# Patient Record
Sex: FEMALE | Race: BLACK OR AFRICAN AMERICAN | Employment: FULL TIME | URBAN - METROPOLITAN AREA
[De-identification: names, ages, dates, MRNs, and addresses within clinical notes are randomized per-mention and may not be internally consistent; named-entity substitution may affect disease eponyms.]

---

## 2017-02-16 ENCOUNTER — HOSPITAL ENCOUNTER (EMERGENCY)
Facility: HOSPITAL | Age: 52
Discharge: HOME | End: 2017-02-16
Attending: EMERGENCY MEDICINE
Payer: COMMERCIAL

## 2017-02-16 ENCOUNTER — APPOINTMENT (OUTPATIENT)
Dept: ULTRASOUND IMAGING | Facility: HOSPITAL | Age: 52
End: 2017-02-16
Attending: EMERGENCY MEDICINE
Payer: COMMERCIAL

## 2017-02-16 ENCOUNTER — APPOINTMENT (OUTPATIENT)
Dept: GENERAL RADIOLOGY | Facility: HOSPITAL | Age: 52
End: 2017-02-16
Attending: EMERGENCY MEDICINE
Payer: COMMERCIAL

## 2017-02-16 VITALS
DIASTOLIC BLOOD PRESSURE: 82 MMHG | TEMPERATURE: 98 F | SYSTOLIC BLOOD PRESSURE: 137 MMHG | WEIGHT: 235 LBS | HEART RATE: 72 BPM | RESPIRATION RATE: 18 BRPM | HEIGHT: 65 IN | BODY MASS INDEX: 39.15 KG/M2 | OXYGEN SATURATION: 95 %

## 2017-02-16 DIAGNOSIS — M25.561 ACUTE PAIN OF RIGHT KNEE: Primary | ICD-10-CM

## 2017-02-16 PROCEDURE — 99284 EMERGENCY DEPT VISIT MOD MDM: CPT

## 2017-02-16 PROCEDURE — 93971 EXTREMITY STUDY: CPT

## 2017-02-16 PROCEDURE — 73560 X-RAY EXAM OF KNEE 1 OR 2: CPT

## 2017-02-16 RX ORDER — IBUPROFEN 600 MG/1
600 TABLET ORAL EVERY 8 HOURS PRN
Qty: 30 TABLET | Refills: 0 | Status: SHIPPED | OUTPATIENT
Start: 2017-02-16 | End: 2017-02-23

## 2017-02-16 RX ORDER — ACETAMINOPHEN AND CODEINE PHOSPHATE 300; 30 MG/1; MG/1
1 TABLET ORAL EVERY 4 HOURS PRN
Qty: 20 TABLET | Refills: 0 | Status: SHIPPED | OUTPATIENT
Start: 2017-02-16 | End: 2017-02-23

## 2017-02-16 NOTE — ED PROVIDER NOTES
Patient Seen in: [facility] AND [facility] Emergency Department    History   Patient presents with:  Lower Extremity Injury (musculoskeletal)    Stated Complaint: right knee pain     HPI    Patient presents the emergency department complaining of right knee pain otherwise stated in HPI.     Physical Exam       ED Triage Vitals   BP 02/16/17 0906 156/83 mmHg   Pulse 02/16/17 0906 81   Resp 02/16/17 0906 18   Temp 02/16/17 0906 97.9 °F (36.6 °C)   Temp src 02/16/17 0906 Oral   SpO2 02/16/17 0906 97 %   O2 Device 02/1 hours as needed for Pain or Fever., Script printed, Disp-30 tablet, R-0

## 2017-02-20 RX ORDER — LOSARTAN POTASSIUM AND HYDROCHLOROTHIAZIDE 12.5; 5 MG/1; MG/1
1 TABLET ORAL DAILY
Qty: 30 TABLET | Refills: 2 | Status: SHIPPED | OUTPATIENT
Start: 2017-02-20 | End: 2017-04-17

## 2017-02-20 NOTE — TELEPHONE ENCOUNTER
Current Outpatient Prescriptions:  Losartan Potassium-HCTZ (HYZAAR) 50-12.5 MG Oral Tab Take 1 tablet by mouth daily.  Disp: 30 tablet Rfl: 5     Refill

## 2017-02-27 ENCOUNTER — TELEPHONE (OUTPATIENT)
Dept: FAMILY MEDICINE CLINIC | Facility: CLINIC | Age: 52
End: 2017-02-27

## 2017-02-27 NOTE — TELEPHONE ENCOUNTER
Patient calling and states she is currently at dentist office for a tooth extraction.   Patient states dental office needs authorization for anesthesia

## 2017-02-27 NOTE — TELEPHONE ENCOUNTER
[id number] Jacklyn Jaime if anesthesiologist present. Patient has Hypertension and Chronic kidney disease.    I have not seen her since oct 2016

## 2017-03-01 NOTE — TELEPHONE ENCOUNTER
Chang Rodriguez care calling to follow-up on request below. States that a fax was sent to our office Monday 2/27 and no response has been received. Patient is scheduled for dental procedure tomorrow. Call [phone] and ask to speak to Krysta.

## 2017-03-01 NOTE — TELEPHONE ENCOUNTER
Chang Rodriguez is calling f/u on pt clearance state that she is leaving the office and need this today please requesting a call back

## 2017-03-02 NOTE — TELEPHONE ENCOUNTER
Patient may get dental extraction per her dentist using local anesthesia with lidocaine with epinephrine as long as monitored for signs of  Hypotension or significant hypertension and  heart rate.  Also, as she has Chronic kidney disease, medication effect

## 2017-03-02 NOTE — TELEPHONE ENCOUNTER
Krysta from dental care needs clearance form. Pt is scheduled for procedure today. Krysta stts it will be local anesthetic.        Fax # [phone]

## 2017-04-10 PROBLEM — M23.91 INTERNAL DERANGEMENT OF KNEE, RIGHT: Status: ACTIVE | Noted: 2017-04-10

## 2017-04-17 ENCOUNTER — OFFICE VISIT (OUTPATIENT)
Dept: FAMILY MEDICINE CLINIC | Facility: CLINIC | Age: 52
End: 2017-04-17

## 2017-04-17 VITALS
SYSTOLIC BLOOD PRESSURE: 133 MMHG | TEMPERATURE: 98 F | WEIGHT: 245 LBS | HEART RATE: 92 BPM | BODY MASS INDEX: 40.82 KG/M2 | DIASTOLIC BLOOD PRESSURE: 88 MMHG | HEIGHT: 65 IN

## 2017-04-17 DIAGNOSIS — N89.8 VAGINAL DISCHARGE: Primary | ICD-10-CM

## 2017-04-17 DIAGNOSIS — Z00.00 WELL ADULT EXAM: ICD-10-CM

## 2017-04-17 DIAGNOSIS — I10 ESSENTIAL HYPERTENSION: ICD-10-CM

## 2017-04-17 PROCEDURE — 99214 OFFICE O/P EST MOD 30 MIN: CPT | Performed by: FAMILY MEDICINE

## 2017-04-17 PROCEDURE — 99212 OFFICE O/P EST SF 10 MIN: CPT | Performed by: FAMILY MEDICINE

## 2017-04-17 RX ORDER — LOSARTAN POTASSIUM AND HYDROCHLOROTHIAZIDE 12.5; 5 MG/1; MG/1
1 TABLET ORAL DAILY
Qty: 90 TABLET | Refills: 1 | Status: SHIPPED | OUTPATIENT
Start: 2017-04-17 | End: 2017-12-13

## 2017-04-17 RX ORDER — AMLODIPINE BESYLATE 10 MG/1
10 TABLET ORAL
Qty: 90 TABLET | Refills: 1 | Status: SHIPPED | OUTPATIENT
Start: 2017-04-17 | End: 2017-12-13

## 2017-04-17 NOTE — PROGRESS NOTES
HPI:    Patient ID: Zari Benjamin is a 46year old female. Blood Pressure  This is a recurrent problem. Episode onset: 2 WEEKS. The problem has been unchanged. Pertinent negatives include no chills or fever. Nothing aggravates the symptoms.  She has trie Chlamydia/Gc Amplification [E]    2. Well adult exam  FOLLOW UP PHYSICAL. LABS ORDERED    3.  Essential hypertension  STABLE  CPM        Orders Placed This Encounter  Gentital vaginosis screen [E]  Chlamydia/Gc Amplification [E]    Meds This Visit:  Signed

## 2017-04-27 ENCOUNTER — HOSPITAL ENCOUNTER (EMERGENCY)
Facility: HOSPITAL | Age: 52
Discharge: HOME | End: 2017-04-27
Attending: EMERGENCY MEDICINE
Payer: COMMERCIAL

## 2017-04-27 ENCOUNTER — HOSPITAL ENCOUNTER (OUTPATIENT)
Age: 52
Discharge: ANOTHER HOSPITAL | End: 2017-04-27
Attending: EMERGENCY MEDICINE
Payer: COMMERCIAL

## 2017-04-27 VITALS
WEIGHT: 225 LBS | HEART RATE: 82 BPM | SYSTOLIC BLOOD PRESSURE: 157 MMHG | TEMPERATURE: 98 F | OXYGEN SATURATION: 98 % | DIASTOLIC BLOOD PRESSURE: 97 MMHG | HEIGHT: 65 IN | BODY MASS INDEX: 37.49 KG/M2 | RESPIRATION RATE: 16 BRPM

## 2017-04-27 VITALS
HEART RATE: 75 BPM | RESPIRATION RATE: 18 BRPM | DIASTOLIC BLOOD PRESSURE: 92 MMHG | BODY MASS INDEX: 39.15 KG/M2 | OXYGEN SATURATION: 100 % | HEIGHT: 65 IN | TEMPERATURE: 98 F | WEIGHT: 235 LBS | SYSTOLIC BLOOD PRESSURE: 148 MMHG

## 2017-04-27 DIAGNOSIS — R10.13 EPIGASTRIC DISCOMFORT: Primary | ICD-10-CM

## 2017-04-27 DIAGNOSIS — R68.2 DRY MOUTH: Primary | ICD-10-CM

## 2017-04-27 PROCEDURE — 93005 ELECTROCARDIOGRAM TRACING: CPT

## 2017-04-27 PROCEDURE — 99282 EMERGENCY DEPT VISIT SF MDM: CPT

## 2017-04-27 PROCEDURE — 93010 ELECTROCARDIOGRAM REPORT: CPT | Performed by: EMERGENCY MEDICINE

## 2017-04-27 PROCEDURE — 93010 ELECTROCARDIOGRAM REPORT: CPT

## 2017-04-27 PROCEDURE — 99215 OFFICE O/P EST HI 40 MIN: CPT

## 2017-04-27 RX ORDER — ONDANSETRON 4 MG/1
4 TABLET, ORALLY DISINTEGRATING ORAL ONCE
Status: DISCONTINUED | OUTPATIENT
Start: 2017-04-27 | End: 2017-04-27

## 2017-04-27 NOTE — ED INITIAL ASSESSMENT (HPI)
Dry mouth, bad taste in mouth. Abdominal discomfort. Denies any increase thirst or voiding difficulties. Feels nauseated at this moment. Denies diarrhea and vomiting.  Denies CP and SOB

## 2017-04-28 NOTE — ED PROVIDER NOTES
Patient Seen in: [facility] AND CLINICS Immediate Care In [address] [place] [address]    History   Patient presents with:  Nausea/Vomiting/Diarrhea (gastrointestinal)    Stated Complaint: upset stomach,bad taste in mouth    HPI    10-year-old female presents for evaluation of u 04/27/17 1716 97.8 °F (36.6 °C)   Temp src 04/27/17 1716 Oral   SpO2 04/27/17 1716 98 %   O2 Device 04/27/17 1716 None (Room air)       Current:/97 mmHg  Pulse 82  Temp(Src) 97.8 °F (36.6 °C) (Oral)  Resp 16  Ht 165.1 cm (5' 5\")  Wt 102.059 kg  BMI provider specified.     Medications Prescribed:  Discharge Medication List as of 4/27/2017  6:25 PM

## 2017-04-28 NOTE — ED PROVIDER NOTES
Patient Seen in: [facility] AND [facility] Emergency Department    History   Patient presents with:  Nausea/Vomiting/Diarrhea (gastrointestinal)    Stated Complaint:     HPI    Patient is a 43-year-old female who presents with dry mouth ×1 month.   Patient states and agreed except as otherwise stated in HPI.     Physical Exam     ED Triage Vitals   BP 04/27/17 1905 148/92 mmHg   Pulse 04/27/17 1905 75   Resp 04/27/17 1905 18   Temp 04/27/17 1905 97.8 °F (36.6 °C)   Temp src 04/27/17 1905 Oral   SpO2 04/27/17 1905 10

## 2017-05-01 ENCOUNTER — TELEPHONE (OUTPATIENT)
Dept: FAMILY MEDICINE CLINIC | Facility: CLINIC | Age: 52
End: 2017-05-01

## 2017-05-01 NOTE — TELEPHONE ENCOUNTER
Pt states she has questions about her previous ER visit and requesting to speak with a nurse. Please advise.

## 2017-05-02 NOTE — TELEPHONE ENCOUNTER
Patient went to both IC and ER 4/27/17. Patient left without cardiac studies. Had bad taste in mouth; c/o of chest pain last week, 3 days prior to ER visit. Didn't have any symptoms now.  She had EKG done and normal. Patient advised to schedule f/u visit to

## 2017-05-02 NOTE — TELEPHONE ENCOUNTER
LMTCB ANETTE please transfer to [facility] at [phone] today or RN at [phone]

## 2017-05-08 ENCOUNTER — OFFICE VISIT (OUTPATIENT)
Dept: OTOLARYNGOLOGY | Facility: CLINIC | Age: 52
End: 2017-05-08

## 2017-05-08 VITALS
SYSTOLIC BLOOD PRESSURE: 149 MMHG | HEIGHT: 65 IN | TEMPERATURE: 97 F | BODY MASS INDEX: 39.15 KG/M2 | DIASTOLIC BLOOD PRESSURE: 97 MMHG | WEIGHT: 235 LBS

## 2017-05-08 DIAGNOSIS — J01.20 SUBACUTE ETHMOIDAL SINUSITIS: Primary | ICD-10-CM

## 2017-05-08 PROCEDURE — 99203 OFFICE O/P NEW LOW 30 MIN: CPT | Performed by: OTOLARYNGOLOGY

## 2017-05-08 PROCEDURE — 99212 OFFICE O/P EST SF 10 MIN: CPT | Performed by: OTOLARYNGOLOGY

## 2017-05-08 RX ORDER — AMOXICILLIN AND CLAVULANATE POTASSIUM 875; 125 MG/1; MG/1
1 TABLET, FILM COATED ORAL EVERY 12 HOURS
Qty: 14 TABLET | Refills: 0 | Status: SHIPPED | OUTPATIENT
Start: 2017-05-08 | End: 2017-05-15

## 2017-05-08 RX ORDER — OMEPRAZOLE 40 MG/1
40 CAPSULE, DELAYED RELEASE ORAL DAILY
Qty: 30 CAPSULE | Refills: 11 | Status: SHIPPED | OUTPATIENT
Start: 2017-05-08 | End: 2017-12-19

## 2017-05-08 NOTE — PROGRESS NOTES
Lorenzo Phillips is a 46year old female. Patient presents with:  Sinus Problem: post nasal drip, c/o metallic taste for 1 month      HISTORY OF PRESENT ILLNESS  5/8/2017  Patient prevents for evaluation of suspected sinusitis. This is not recurrent.  Recent Negative Dyspnea and wheezing. Cardio Negative Chest pain, irregular heartbeat/palpitations and syncope. GI Negative Abdominal pain and diarrhea. Endocrine Negative Cold intolerance and heat intolerance. Neuro Negative Tremors.    Psych Negative Anx a meal, Disp: 30 capsule, Rfl: 11  •  Losartan Potassium-HCTZ (HYZAAR) 50-12.5 MG Oral Tab, Take 1 tablet by mouth daily. , Disp: 90 tablet, Rfl: 1  •  AmLODIPine Besylate 10 MG Oral Tab, Take 1 tablet (10 mg total) by mouth once daily. , Disp: 90 tablet, Rf

## 2017-06-20 ENCOUNTER — OFFICE VISIT (OUTPATIENT)
Dept: FAMILY MEDICINE CLINIC | Facility: CLINIC | Age: 52
End: 2017-06-20

## 2017-06-20 VITALS
TEMPERATURE: 98 F | BODY MASS INDEX: 40.82 KG/M2 | SYSTOLIC BLOOD PRESSURE: 138 MMHG | WEIGHT: 245 LBS | HEART RATE: 77 BPM | DIASTOLIC BLOOD PRESSURE: 88 MMHG | HEIGHT: 65 IN

## 2017-06-20 DIAGNOSIS — E78.00 HYPERCHOLESTEREMIA: ICD-10-CM

## 2017-06-20 DIAGNOSIS — E66.01 MORBID OBESITY WITH BMI OF 40.0-44.9, ADULT (HCC): ICD-10-CM

## 2017-06-20 DIAGNOSIS — Z00.00 WELL ADULT EXAM: Primary | ICD-10-CM

## 2017-06-20 DIAGNOSIS — Z12.31 ENCOUNTER FOR SCREENING MAMMOGRAM FOR BREAST CANCER: ICD-10-CM

## 2017-06-20 DIAGNOSIS — Z01.419 ENCOUNTER FOR GYNECOLOGICAL EXAMINATION: ICD-10-CM

## 2017-06-20 DIAGNOSIS — I10 ESSENTIAL HYPERTENSION: ICD-10-CM

## 2017-06-20 DIAGNOSIS — Z12.11 COLON CANCER SCREENING: ICD-10-CM

## 2017-06-20 PROCEDURE — 99396 PREV VISIT EST AGE 40-64: CPT | Performed by: FAMILY MEDICINE

## 2017-06-20 NOTE — PROGRESS NOTES
HPI:   Lorenzo Phillips is a 46year old female who presents for a complete physical exam. Symptoms: is menopausal.     Wt Readings from Last 6 Encounters:  06/20/17 : 245 lb (111.131 kg)  05/08/17 : 235 lb (106.595 kg)  04/27/17 : 235 lb (106.595 kg)  04/27/ week       Comment: marco, 3 x week    Occ: ups. : n. Children: 1.       Immunization History   Administered Date(s) Administered   • Kenalog Per 10mg Inj 02/20/2017   • Tb Intradermal Test 02/10/2015       FIT Colorectal Screening due on 10/25/20 nerves are intact,motor and sensory are grossly intact    ASSESSMENT AND PLAN:   Mroaima Castillo is a 46year old female who presents for a complete physical exam. pelvic done. Order put in for mammogram.Self breast exam explained.  Health maintenance reviewe

## 2017-07-08 ENCOUNTER — TELEPHONE (OUTPATIENT)
Dept: FAMILY MEDICINE CLINIC | Facility: CLINIC | Age: 52
End: 2017-07-08

## 2017-07-08 NOTE — TELEPHONE ENCOUNTER
On-call note: Patient states she has had vaginal discharge for more than 1 week. She states there is nothing on the panties but she feels a slight irritation below and if she puts her finger inside she gets a little white discharge on her finger.   She has

## 2017-07-09 ENCOUNTER — HOSPITAL ENCOUNTER (OUTPATIENT)
Age: 52
Discharge: HOME | End: 2017-07-09
Payer: COMMERCIAL

## 2017-07-09 VITALS
WEIGHT: 250 LBS | DIASTOLIC BLOOD PRESSURE: 94 MMHG | RESPIRATION RATE: 18 BRPM | TEMPERATURE: 99 F | HEART RATE: 87 BPM | BODY MASS INDEX: 41.65 KG/M2 | HEIGHT: 65 IN | SYSTOLIC BLOOD PRESSURE: 145 MMHG

## 2017-07-09 DIAGNOSIS — N76.1 SUBACUTE VAGINITIS: Primary | ICD-10-CM

## 2017-07-09 PROCEDURE — 87205 SMEAR GRAM STAIN: CPT | Performed by: EMERGENCY MEDICINE

## 2017-07-09 PROCEDURE — 87491 CHLMYD TRACH DNA AMP PROBE: CPT | Performed by: EMERGENCY MEDICINE

## 2017-07-09 PROCEDURE — 99214 OFFICE O/P EST MOD 30 MIN: CPT

## 2017-07-09 PROCEDURE — 87106 FUNGI IDENTIFICATION YEAST: CPT | Performed by: EMERGENCY MEDICINE

## 2017-07-09 PROCEDURE — 87808 TRICHOMONAS ASSAY W/OPTIC: CPT | Performed by: EMERGENCY MEDICINE

## 2017-07-09 PROCEDURE — 87591 N.GONORRHOEAE DNA AMP PROB: CPT | Performed by: EMERGENCY MEDICINE

## 2017-07-09 NOTE — ED PROVIDER NOTES
Patient Seen in: [facility] AND CLINICS Immediate Care In [address] [place] [address]    History   Patient presents with:  Eval-G (gynecologic)    Stated Complaint: discharge    HPI    Patient is a 28-year-old female who presents to the urgent care with a chief complaint of vag °C)  Temp src: Oral  SpO2: n/a  O2 Device: n/a    Current:/94   Pulse 87   Temp 98.8 °F (37.1 °C) (Oral)   Resp 18   Ht 165.1 cm (5' 5\")   Wt 113.4 kg   BMI 41.60 kg/m²         Physical Exam   Constitutional: She is oriented to person, place, and ti
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
## 2017-07-10 NOTE — TELEPHONE ENCOUNTER
DR Peter Ace, patient declined to make appt, asymptomatic at this time, will await call back with results of vaginosis screen and GC/chlamydia

## 2017-07-10 NOTE — TELEPHONE ENCOUNTER
Reason for call changed from blank to ONCALL  Chart reviewed, patient treated 7/9/17 ADO IC for subacute vaginitis, genital vaginosis screeen, gc chlamydia sent to lab  Actions Requested: patient asymptomatic at this time,  declined to make f/u appt, will

## 2017-07-11 LAB
CANDIDA SCREEN: NEGATIVE
GENITAL VAGINOSIS SCREEN: NEGATIVE
TRICHOMONAS SCREEN: NEGATIVE

## 2017-08-07 ENCOUNTER — TELEPHONE (OUTPATIENT)
Dept: GASTROENTEROLOGY | Facility: CLINIC | Age: 52
End: 2017-08-07

## 2017-08-07 NOTE — TELEPHONE ENCOUNTER
Pt contacted --she could not get off earlier, so I switched with other pt --Fan Camacho is agreeable to a 2:15pm arrival for 2:30pm appt.

## 2017-08-07 NOTE — TELEPHONE ENCOUNTER
PHONE ROOM--TRANSFER TO Valerio Cevallos RN.    I left complete message on pt's voice mail to call--need to make a schedule change in her Tues Aug 15 appt currently at 2:15pm..  I need to see if pt can accept 1:45pm appt that day with 1:30pm arrival at [facility], but have

## 2017-08-10 ENCOUNTER — HOSPITAL ENCOUNTER (OUTPATIENT)
Dept: MAMMOGRAPHY | Age: 52
Discharge: HOME | End: 2017-08-10
Attending: FAMILY MEDICINE
Payer: COMMERCIAL

## 2017-08-10 ENCOUNTER — LAB ENCOUNTER (OUTPATIENT)
Dept: LAB | Age: 52
End: 2017-08-10
Attending: FAMILY MEDICINE
Payer: COMMERCIAL

## 2017-08-10 DIAGNOSIS — Z12.31 ENCOUNTER FOR SCREENING MAMMOGRAM FOR BREAST CANCER: ICD-10-CM

## 2017-08-10 DIAGNOSIS — Z00.00 WELL ADULT EXAM: ICD-10-CM

## 2017-08-10 LAB
ALT SERPL-CCNC: 31 U/L (ref 14–54)
ANION GAP SERPL CALC-SCNC: 10 MMOL/L (ref 0–18)
AST SERPL-CCNC: 26 U/L (ref 15–41)
BASOPHILS # BLD: 0.1 K/UL (ref 0–0.2)
BASOPHILS NFR BLD: 1 %
BUN SERPL-MCNC: 20 MG/DL (ref 8–20)
BUN/CREAT SERPL: 20.4 (ref 10–20)
CALCIUM SERPL-MCNC: 9.5 MG/DL (ref 8.5–10.5)
CHLORIDE SERPL-SCNC: 107 MMOL/L (ref 95–110)
CHOLEST SERPL-MCNC: 214 MG/DL (ref 110–200)
CO2 SERPL-SCNC: 24 MMOL/L (ref 22–32)
CREAT SERPL-MCNC: 0.98 MG/DL (ref 0.5–1.5)
EOSINOPHIL # BLD: 0.1 K/UL (ref 0–0.7)
EOSINOPHIL NFR BLD: 2 %
ERYTHROCYTE [DISTWIDTH] IN BLOOD BY AUTOMATED COUNT: 16 % (ref 11–15)
GLUCOSE SERPL-MCNC: 97 MG/DL (ref 70–99)
HCT VFR BLD AUTO: 42.3 % (ref 35–48)
HDLC SERPL-MCNC: 52 MG/DL
HGB BLD-MCNC: 13.8 G/DL (ref 12–16)
LDLC SERPL CALC-MCNC: 141 MG/DL (ref 0–99)
LYMPHOCYTES # BLD: 2 K/UL (ref 1–4)
LYMPHOCYTES NFR BLD: 33 %
MCH RBC QN AUTO: 27 PG (ref 27–32)
MCHC RBC AUTO-ENTMCNC: 32.6 G/DL (ref 32–37)
MCV RBC AUTO: 82.9 FL (ref 80–100)
MONOCYTES # BLD: 0.4 K/UL (ref 0–1)
MONOCYTES NFR BLD: 7 %
NEUTROPHILS # BLD AUTO: 3.5 K/UL (ref 1.8–7.7)
NEUTROPHILS NFR BLD: 57 %
NONHDLC SERPL-MCNC: 162 MG/DL
OSMOLALITY UR CALC.SUM OF ELEC: 295 MOSM/KG (ref 275–295)
PLATELET # BLD AUTO: 219 K/UL (ref 140–400)
PMV BLD AUTO: 9.8 FL (ref 7.4–10.3)
POTASSIUM SERPL-SCNC: 3.6 MMOL/L (ref 3.3–5.1)
RBC # BLD AUTO: 5.1 M/UL (ref 3.7–5.4)
SODIUM SERPL-SCNC: 141 MMOL/L (ref 136–144)
TRIGL SERPL-MCNC: 106 MG/DL (ref 1–149)
TSH SERPL-ACNC: 0.83 UIU/ML (ref 0.45–5.33)
WBC # BLD AUTO: 6.1 K/UL (ref 4–11)

## 2017-08-10 PROCEDURE — 80061 LIPID PANEL: CPT

## 2017-08-10 PROCEDURE — 80048 BASIC METABOLIC PNL TOTAL CA: CPT

## 2017-08-10 PROCEDURE — 36415 COLL VENOUS BLD VENIPUNCTURE: CPT

## 2017-08-10 PROCEDURE — 82306 VITAMIN D 25 HYDROXY: CPT

## 2017-08-10 PROCEDURE — 84460 ALANINE AMINO (ALT) (SGPT): CPT

## 2017-08-10 PROCEDURE — 84443 ASSAY THYROID STIM HORMONE: CPT

## 2017-08-10 PROCEDURE — 77067 SCR MAMMO BI INCL CAD: CPT | Performed by: FAMILY MEDICINE

## 2017-08-10 PROCEDURE — 84450 TRANSFERASE (AST) (SGOT): CPT

## 2017-08-10 PROCEDURE — 85025 COMPLETE CBC W/AUTO DIFF WBC: CPT

## 2017-08-11 LAB — 25(OH)D3 SERPL-MCNC: 31.2 NG/ML

## 2017-08-15 ENCOUNTER — TELEPHONE (OUTPATIENT)
Dept: GASTROENTEROLOGY | Facility: CLINIC | Age: 52
End: 2017-08-15

## 2017-08-15 ENCOUNTER — OFFICE VISIT (OUTPATIENT)
Dept: FAMILY MEDICINE CLINIC | Facility: CLINIC | Age: 52
End: 2017-08-15

## 2017-08-15 ENCOUNTER — OFFICE VISIT (OUTPATIENT)
Dept: GASTROENTEROLOGY | Facility: CLINIC | Age: 52
End: 2017-08-15

## 2017-08-15 VITALS
DIASTOLIC BLOOD PRESSURE: 78 MMHG | HEIGHT: 65 IN | BODY MASS INDEX: 41.65 KG/M2 | WEIGHT: 250 LBS | HEART RATE: 76 BPM | TEMPERATURE: 98 F | SYSTOLIC BLOOD PRESSURE: 129 MMHG

## 2017-08-15 VITALS
WEIGHT: 250 LBS | BODY MASS INDEX: 41.65 KG/M2 | SYSTOLIC BLOOD PRESSURE: 149 MMHG | DIASTOLIC BLOOD PRESSURE: 88 MMHG | HEART RATE: 75 BPM | HEIGHT: 65 IN

## 2017-08-15 DIAGNOSIS — R22.1 NECK MASS: ICD-10-CM

## 2017-08-15 DIAGNOSIS — K59.00 CONSTIPATION, UNSPECIFIED CONSTIPATION TYPE: ICD-10-CM

## 2017-08-15 DIAGNOSIS — Z80.0 FAMILY HISTORY OF ESOPHAGEAL CANCER: ICD-10-CM

## 2017-08-15 DIAGNOSIS — Z12.11 SCREENING FOR COLON CANCER: Primary | ICD-10-CM

## 2017-08-15 DIAGNOSIS — I10 ESSENTIAL HYPERTENSION: ICD-10-CM

## 2017-08-15 DIAGNOSIS — E78.00 HYPERCHOLESTEREMIA: Primary | ICD-10-CM

## 2017-08-15 PROCEDURE — 99243 OFF/OP CNSLTJ NEW/EST LOW 30: CPT | Performed by: NURSE PRACTITIONER

## 2017-08-15 PROCEDURE — 99212 OFFICE O/P EST SF 10 MIN: CPT | Performed by: FAMILY MEDICINE

## 2017-08-15 PROCEDURE — 99212 OFFICE O/P EST SF 10 MIN: CPT | Performed by: NURSE PRACTITIONER

## 2017-08-15 PROCEDURE — 99214 OFFICE O/P EST MOD 30 MIN: CPT | Performed by: FAMILY MEDICINE

## 2017-08-15 NOTE — TELEPHONE ENCOUNTER
Scheduled for:  Colon  Provider Name: Dr. Bob Oneill   Date:  11-6-17  Location:  [facility]  Sedation:  IV sedation  Time:  11:15am, arrival 10:15am  Prep: Suprep  Meds/Allergies Reconciled?:  yes  Diagnosis with codes:  Screening Z12.11   Was patient informed

## 2017-08-15 NOTE — PATIENT INSTRUCTIONS
Controlling Your Cholesterol  Cholesterol is a waxy substance. It travels in your blood through the blood vessels. When you have high cholesterol, it builds up in the walls of the blood vessels. This makes the vessels narrower. Blood flow decreases.  You ¨ Choose an activity you enjoy. Walking, swimming, and riding a bike are some good ways to be active. ¨ Start at a level where you feel comfortable. Increase your time and pace a little each week.   ¨ Work up to 40 minutes of moderate to high intensity phy · Cutting back on the amount of fat and cholesterol in your meals  · Eating less salt (sodium). This is especially important if you have high blood pressure.   · Eating more fresh vegetables and fruits  · Eating lean proteins such as fish, poultry, beans, a Smoking and other tobacco use can raise cholesterol and make it harder to control. Quitting is tough. But millions of people have given up tobacco for good. You can quit, too! Think about some of the reasons below to quit smoking.  Do any of them make you t Some people may need to take medicines called statins to control their cholesterol. This is in addition to eating a healthy diet and getting regular exercise. Statins can help you stay healthy. They can also help prevent a heart attack or stroke.  You may

## 2017-08-15 NOTE — TELEPHONE ENCOUNTER
Pt has BMI over 40 and cannot have colon at [facility]. Please do pre-cert for [facility].

## 2017-08-15 NOTE — PROGRESS NOTES
HPI:    Patient ID: Nicol Quiñonez is a 46year old female. HPI     Patient presents with:  Derm Problem: Pt presented with cyst on neck x 1 week      Feels a \"knot\" back of her neck, felt it about a week ago. No change. No pain.     Here also to discus encounter    Imaging & Referrals:  CT SOFT TISSUE OF NECK (CPT=70490)       GZ#[id number]

## 2017-08-15 NOTE — PATIENT INSTRUCTIONS
1. Schedule colonoscopy with Dr. Patti Hobbs w/ IV Twilight    2.  bowel prep from pharmacy - split dose Suprep (eRx)    3. Continue all medications for procedure    4. Read all bowel prep instructions carefully    5.  AVOID seeds, nuts, popc

## 2017-08-15 NOTE — H&P
[address] [facility] [address] Gastroenterology                                                                                                  Clinic History and Physical     Pa history  + etoh - 1/5 hard liquid in day - slowed down to 1/5 over 2 days  - Denies illicit drug use  - LMP: Ablation  - NSAIDs/ASA use: Occasionally     History, Medications, Allergies, ROS:      Past Medical History:   Diagnosis Date   • Esophageal reflu HPI  GENITOURINARY:  negative for dysuria or gross hematuria  INTEGUMENT/BREAST:  SKIN:  negative for jaundice   ALLERGIC/IMMUNOLOGIC:  negative for hay fever or seasonal allergies  ENDOCRINE:  negative for cold intolerance and heat intolerance  MUSCULOSKE testing, they want to proceed with colonoscopy. 2.  Constipation: Physical exam unremarkable. Patient advised to continue increasing her dietary fiber may also supplement with OTC fiber supplementation to see if this helps regulate her bowel movements.

## 2017-08-16 NOTE — TELEPHONE ENCOUNTER
Contacted [phone] care notifications/prior authorizations    Spoke with [facility] with [facility] outpatient - does require precertification    DOS  TAX ID with address provided   ICD 10 Z12.11  CPT  94926  NPI for MD - phone, fax and address

## 2017-08-23 ENCOUNTER — TELEPHONE (OUTPATIENT)
Dept: FAMILY MEDICINE CLINIC | Facility: CLINIC | Age: 52
End: 2017-08-23

## 2017-09-13 NOTE — PROGRESS NOTES
HPI:    Patient ID: Vladimir Peterson is a 46year old female. HPI     Patient here with acute grief reaction. Older brother just passed away 2 weeks ago from stomach cancer. (8/28)    Patient very tearful. Feels very sad, cannot get out of bed.   Lost he normal. Judgment and thought content normal. Her mood appears not anxious. Her affect is not angry, not blunt, not labile and not inappropriate. She is slowed. Cognition and memory are normal. She exhibits a depressed mood.    Tearful, wearing dark glasses

## 2017-09-18 ENCOUNTER — TELEPHONE (OUTPATIENT)
Dept: FAMILY MEDICINE CLINIC | Facility: CLINIC | Age: 52
End: 2017-09-18

## 2017-09-18 NOTE — TELEPHONE ENCOUNTER
Per pt, she would like to know if AA can extend her out of work until 09/24/2017 and she will be back to work on 09/25/2017.   Pt vitoa that she has an appt with her guidance counselor on 09/20/20147 and would like to  the ltr also on Wednesday in [facility]

## 2017-09-18 NOTE — TELEPHONE ENCOUNTER
Patient informed letter is ready for  at [facility] . Patient verbalized understanding.

## 2017-09-21 ENCOUNTER — TELEPHONE (OUTPATIENT)
Dept: FAMILY MEDICINE CLINIC | Facility: CLINIC | Age: 52
End: 2017-09-21

## 2017-10-12 ENCOUNTER — TELEPHONE (OUTPATIENT)
Dept: GASTROENTEROLOGY | Facility: CLINIC | Age: 52
End: 2017-10-12

## 2017-10-12 DIAGNOSIS — Z12.11 COLON CANCER SCREENING: Primary | ICD-10-CM

## 2017-10-16 ENCOUNTER — OFFICE VISIT (OUTPATIENT)
Dept: FAMILY MEDICINE CLINIC | Facility: CLINIC | Age: 52
End: 2017-10-16

## 2017-10-16 VITALS
SYSTOLIC BLOOD PRESSURE: 136 MMHG | HEIGHT: 65 IN | TEMPERATURE: 98 F | DIASTOLIC BLOOD PRESSURE: 88 MMHG | BODY MASS INDEX: 42.65 KG/M2 | WEIGHT: 256 LBS | HEART RATE: 82 BPM

## 2017-10-16 DIAGNOSIS — S39.012D BACK STRAIN, SUBSEQUENT ENCOUNTER: ICD-10-CM

## 2017-10-16 DIAGNOSIS — M54.2 NECK PAIN, BILATERAL: ICD-10-CM
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
## 2017-10-16 PROCEDURE — 99214 OFFICE O/P EST MOD 30 MIN: CPT | Performed by: FAMILY MEDICINE

## 2017-10-16 PROCEDURE — 99212 OFFICE O/P EST SF 10 MIN: CPT | Performed by: FAMILY MEDICINE

## 2017-10-16 RX ORDER — CYCLOBENZAPRINE HCL 10 MG
TABLET ORAL
COMMUNITY
Start: 2017-10-10 | End: 2018-07-05

## 2017-10-16 RX ORDER — IBUPROFEN 600 MG/1
TABLET ORAL
COMMUNITY
Start: 2017-10-10 | End: 2018-07-05
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
## 2017-10-17 NOTE — TELEPHONE ENCOUNTER
DAVID RN's - this pt was rescheduled to 1/22/18 @ [facility] with GS. It looks like there was an approval from [facility] due to her BMI & the [facility] for her original procedure date of 11/6/17. Please advise if [facility] needs to be notified again. Thank you.

## 2017-10-17 NOTE — TELEPHONE ENCOUNTER
Rescheduled for:  Colonoscopy - 76604    Provider Name:  Dr. Sandie Phipps  Date:    FROM - 11/6/17              TO - 1/22/18  Location:  [facility]  Sedation:  IV  Time:    FROM - 11:15 am               TO - 9:15 am (pt is aware to arrive at 8:15 am)  Prep:  Suprep, Prep i

## 2017-10-18 ENCOUNTER — APPOINTMENT (OUTPATIENT)
Dept: PHYSICAL THERAPY | Age: 52
End: 2017-10-18
Attending: PEDIATRICS
Payer: COMMERCIAL

## 2017-10-18 NOTE — TELEPHONE ENCOUNTER
Called [facility] provider line [phone] with ref # provided previous ref # and DOS updated to reflect 1/22/18 date    Expires 4/22/18    Ref # for todays call # [id number].  Spoke with Cristobal

## 2017-10-23 ENCOUNTER — APPOINTMENT (OUTPATIENT)
Dept: PHYSICAL THERAPY | Age: 52
End: 2017-10-23
Attending: PEDIATRICS
Payer: COMMERCIAL

## 2017-10-25 ENCOUNTER — OFFICE VISIT (OUTPATIENT)
Dept: PHYSICAL THERAPY | Age: 52
End: 2017-10-25
Attending: PEDIATRICS
Payer: COMMERCIAL

## 2017-10-25 DIAGNOSIS — S39.012D BACK STRAIN, SUBSEQUENT ENCOUNTER: ICD-10-CM

## 2017-10-25 DIAGNOSIS — M54.2 NECK PAIN, BILATERAL: ICD-10-CM
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
## 2017-10-25 PROCEDURE — 97162 PT EVAL MOD COMPLEX 30 MIN: CPT | Performed by: PHYSICAL THERAPIST

## 2017-10-25 PROCEDURE — 97110 THERAPEUTIC EXERCISES: CPT | Performed by: PHYSICAL THERAPIST
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
## 2017-10-30 ENCOUNTER — OFFICE VISIT (OUTPATIENT)
Dept: PHYSICAL THERAPY | Age: 52
End: 2017-10-30
Attending: FAMILY MEDICINE
Payer: COMMERCIAL

## 2017-10-30 PROCEDURE — 97110 THERAPEUTIC EXERCISES: CPT
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
## 2017-11-01 ENCOUNTER — OFFICE VISIT (OUTPATIENT)
Dept: PHYSICAL THERAPY | Age: 52
End: 2017-11-01
Attending: PEDIATRICS
Payer: COMMERCIAL

## 2017-11-01 PROCEDURE — 97110 THERAPEUTIC EXERCISES: CPT | Performed by: PHYSICAL THERAPIST
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
## 2017-11-29 ENCOUNTER — NURSE TRIAGE (OUTPATIENT)
Dept: OTHER | Age: 52
End: 2017-11-29

## 2017-11-29 NOTE — TELEPHONE ENCOUNTER
Action Requested: Summary for Provider     []  Critical Lab, Recommendations Needed  [x] Need Additional Advice  []   FYI    [x]   Need Orders  [] Need Medications Sent to Pharmacy  []  Other     SUMMARY: Patient with complaints of redness only in one corn

## 2017-11-29 NOTE — TELEPHONE ENCOUNTER
Avoid wearing contact lenses until symptoms have resolved. Recommend warm compresses to both eyes. Avoid rubbing eyes. Avoid any eye makeup  If symptoms worsen I would recommend she follow-up in the office.

## 2017-12-14 RX ORDER — AMLODIPINE BESYLATE 10 MG/1
10 TABLET ORAL
Qty: 90 TABLET | Refills: 0 | Status: SHIPPED | OUTPATIENT
Start: 2017-12-14 | End: 2018-06-09

## 2017-12-14 RX ORDER — LOSARTAN POTASSIUM AND HYDROCHLOROTHIAZIDE 12.5; 5 MG/1; MG/1
1 TABLET ORAL DAILY
Qty: 90 TABLET | Refills: 0 | Status: SHIPPED | OUTPATIENT
Start: 2017-12-14 | End: 2018-06-16

## 2017-12-14 NOTE — TELEPHONE ENCOUNTER
AMLODIPINE, LOSARTAN/HCTZ Medication refilled for 90 days per protocol.     Hypertensive Medications  Protocol Criteria:  · Appointment scheduled in the past 6 months or in the next 3 months  · BMP or CMP in the past 12 months  · Creatinine result < 2  Rece

## 2017-12-19 ENCOUNTER — APPOINTMENT (OUTPATIENT)
Dept: LAB | Age: 52
End: 2017-12-19
Attending: FAMILY MEDICINE
Payer: COMMERCIAL

## 2017-12-19 ENCOUNTER — OFFICE VISIT (OUTPATIENT)
Dept: FAMILY MEDICINE CLINIC | Facility: CLINIC | Age: 52
End: 2017-12-19

## 2017-12-19 VITALS
DIASTOLIC BLOOD PRESSURE: 98 MMHG | WEIGHT: 256 LBS | SYSTOLIC BLOOD PRESSURE: 140 MMHG | BODY MASS INDEX: 43 KG/M2 | TEMPERATURE: 98 F | HEART RATE: 92 BPM

## 2017-12-19 DIAGNOSIS — J06.9 URI, ACUTE: ICD-10-CM

## 2017-12-19 DIAGNOSIS — J06.9 URI, ACUTE: Primary | ICD-10-CM

## 2017-12-19 DIAGNOSIS — Z20.6 EXPOSURE TO HIV: ICD-10-CM

## 2017-12-19 DIAGNOSIS — I10 ESSENTIAL HYPERTENSION: ICD-10-CM

## 2017-12-19 PROCEDURE — 87389 HIV-1 AG W/HIV-1&-2 AB AG IA: CPT

## 2017-12-19 PROCEDURE — 36415 COLL VENOUS BLD VENIPUNCTURE: CPT

## 2017-12-19 PROCEDURE — 99212 OFFICE O/P EST SF 10 MIN: CPT | Performed by: FAMILY MEDICINE

## 2017-12-19 PROCEDURE — 99214 OFFICE O/P EST MOD 30 MIN: CPT | Performed by: FAMILY MEDICINE
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
## 2017-12-21 ENCOUNTER — TELEPHONE (OUTPATIENT)
Dept: FAMILY MEDICINE CLINIC | Facility: CLINIC | Age: 52
End: 2017-12-21

## 2018-01-22 ENCOUNTER — TELEPHONE (OUTPATIENT)
Dept: GASTROENTEROLOGY | Facility: CLINIC | Age: 53
End: 2018-01-22

## 2018-01-22 DIAGNOSIS — Z12.11 COLON CANCER SCREENING: Primary | ICD-10-CM

## 2018-01-22 NOTE — TELEPHONE ENCOUNTER
Patient left message with answering service cancelling today- she is scheduled for a procedure this morning

## 2018-01-22 NOTE — TELEPHONE ENCOUNTER
Late entry. I spoke to pt and she is ill--will reschedule her screening colonoscopy. Informed that GI schedulers will call within the next week. Anne Sandhoff in endo notified. Removed from epic. Please note 10/12/17 encounter.  It appears [facility] was contacted and exp 4/

## 2018-01-30 NOTE — TELEPHONE ENCOUNTER
Scheduled for:  Colonoscopy - 41007  Provider Name:  Dr. Gavi Weir  Date:  3/20/18  Location:  [facility]  Sedation:  IV  Time:  10:30 am (pt is aware to arrive at 9:30 am)  Prep:  Suprep, Prep instructions were given to pt over the phone, pt verbalized understanding.

## 2018-02-02 ENCOUNTER — NURSE TRIAGE (OUTPATIENT)
Dept: OTHER | Age: 53
End: 2018-02-02

## 2018-02-02 NOTE — TELEPHONE ENCOUNTER
Action Requested: Summary for Provider     []  Critical Lab, Recommendations Needed  [] Need Additional Advice  [x]   FYI    []   Need Orders  [] Need Medications Sent to Pharmacy  []  Other     SUMMARY: Patient decided to go to Immediate Care now.     Luz Maria

## 2018-02-03 NOTE — TELEPHONE ENCOUNTER
Spoke with patient who reports she is feeling better. Patient was instructed to call to schedule an appointment if symptoms return or to go to the immediate care center if symptoms become severe. Patient voiced understanding.  Message routed to provider as

## 2018-03-14 ENCOUNTER — TELEPHONE (OUTPATIENT)
Dept: GASTROENTEROLOGY | Facility: CLINIC | Age: 53
End: 2018-03-14

## 2018-03-14 NOTE — TELEPHONE ENCOUNTER
Message     Hi, Patient is scheduled for OPS at [facility] on 3/20/18. Patient needs auth from [facility]. Can someone obtain this authorization? Thank you,   [facility]   [phone]     See 10/12/17ricardo.

## 2018-03-19 ENCOUNTER — TELEPHONE (OUTPATIENT)
Dept: GASTROENTEROLOGY | Facility: CLINIC | Age: 53
End: 2018-03-19

## 2018-03-19 DIAGNOSIS — Z12.11 SCREENING FOR COLON CANCER: Primary | ICD-10-CM

## 2018-03-19 NOTE — TELEPHONE ENCOUNTER
Pt states she would like to reschedule CLN. Pt is aware of at least 72hr call back time.  Please call thank you [phone]

## 2018-03-19 NOTE — TELEPHONE ENCOUNTER
GI/RN--    This patient rescheduled her procedure until June but will need a prior auth since her BMI is to high for [facility]. Thank you.

## 2018-03-19 NOTE — TELEPHONE ENCOUNTER
Rescheduled for:  Colonoscopy 51746  Provider Name: Dr. Demetria Pepe  Date:    From-3/20/18  To-6/2/18  Location:  [facility]  Sedation:  IV  Time:    From-1030  MP-[phone] (pt is aware to arrive at [address] [place] [address])   Prep:  Suprep, mailed new instructions 3/19/18 with codes  Meds/Aller

## 2018-03-19 NOTE — TELEPHONE ENCOUNTER
I spoke to Damian at [facility] --he changed the date to June 2, aware at [facility] due to BMI 42.6--gave same auth #[id number], expires Aug 31, 2018--HOWEVER, IF PT RESCHEDULES AGAIN, WE STILL NEED TO NOTIFY OF NEW DATE.  I left message on pt's voicemail re

## 2018-03-20 ENCOUNTER — OFFICE VISIT (OUTPATIENT)
Dept: FAMILY MEDICINE CLINIC | Facility: CLINIC | Age: 53
End: 2018-03-20

## 2018-03-20 VITALS
TEMPERATURE: 99 F | DIASTOLIC BLOOD PRESSURE: 86 MMHG | SYSTOLIC BLOOD PRESSURE: 133 MMHG | HEART RATE: 88 BPM | WEIGHT: 245 LBS | BODY MASS INDEX: 40.82 KG/M2 | HEIGHT: 65 IN

## 2018-03-20 DIAGNOSIS — E78.00 HYPERCHOLESTEREMIA: ICD-10-CM

## 2018-03-20 DIAGNOSIS — T74.31XA: ICD-10-CM

## 2018-03-20 DIAGNOSIS — H93.11 TINNITUS OF RIGHT EAR: Primary | ICD-10-CM

## 2018-03-20 DIAGNOSIS — I10 ESSENTIAL HYPERTENSION: ICD-10-CM

## 2018-03-20 DIAGNOSIS — H93.8X1 MASS OF RIGHT EAR: ICD-10-CM

## 2018-03-20 DIAGNOSIS — R41.840 DISTURBED CONCENTRATION: ICD-10-CM

## 2018-03-20 PROCEDURE — 99214 OFFICE O/P EST MOD 30 MIN: CPT | Performed by: FAMILY MEDICINE

## 2018-03-20 PROCEDURE — 99212 OFFICE O/P EST SF 10 MIN: CPT | Performed by: FAMILY MEDICINE

## 2018-03-20 NOTE — PROGRESS NOTES
HPI:    Patient ID: Tamika Pacheco is a 45year old female. HPI     Patient presents with:  Ringing In Ear: R X 1 yr  Memory Loss    Patient here with ringing  Right ear x 1 year. Feels a \"knot\" behind right ear.       Also has problems focusing  Loses injected.    Right ear- - lump just surrounding ear lobe, non tender  No other cervical nodes noted   Skin:   Left hand- blood blister, blue black, no redness/ discharge  Difficulty flexing hand into a fist due to tension              ASSESSMENT/PLAN:   Tin

## 2018-03-21 ENCOUNTER — TELEPHONE (OUTPATIENT)
Dept: FAMILY MEDICINE CLINIC | Facility: CLINIC | Age: 53
End: 2018-03-21

## 2018-03-21 NOTE — TELEPHONE ENCOUNTER
Katiuska Myles MD             Hi Dr. Destiny Barajas,     On March 21st, the following referrals for therapy were provided to the patient:     Dr. Timoteo Gonzalez and Ana Maria Cantu   [facility] [address]   [place] [address]

## 2018-04-04 ENCOUNTER — OFFICE VISIT (OUTPATIENT)
Dept: AUDIOLOGY | Facility: CLINIC | Age: 53
End: 2018-04-04

## 2018-04-04 ENCOUNTER — OFFICE VISIT (OUTPATIENT)
Dept: OTOLARYNGOLOGY | Facility: CLINIC | Age: 53
End: 2018-04-04

## 2018-04-04 VITALS
WEIGHT: 245 LBS | BODY MASS INDEX: 40.82 KG/M2 | SYSTOLIC BLOOD PRESSURE: 147 MMHG | DIASTOLIC BLOOD PRESSURE: 95 MMHG | TEMPERATURE: 98 F | HEIGHT: 65 IN

## 2018-04-04 DIAGNOSIS — H93.11 RINGING IN EAR, RIGHT: Primary | ICD-10-CM

## 2018-04-04 DIAGNOSIS — H93.11 TINNITUS, RIGHT: Primary | ICD-10-CM

## 2018-04-04 PROCEDURE — 92557 COMPREHENSIVE HEARING TEST: CPT | Performed by: AUDIOLOGIST

## 2018-04-04 PROCEDURE — 99212 OFFICE O/P EST SF 10 MIN: CPT | Performed by: OTOLARYNGOLOGY

## 2018-04-04 PROCEDURE — 92570 ACOUSTIC IMMITANCE TESTING: CPT | Performed by: AUDIOLOGIST

## 2018-04-04 PROCEDURE — 99214 OFFICE O/P EST MOD 30 MIN: CPT | Performed by: OTOLARYNGOLOGY

## 2018-04-04 NOTE — PROGRESS NOTES
AUDIOGRAM     Diya Martin was referred for testing by Robert Moncada for right sided tinnitus. 10/25/1965  [id number]      Poor reliability/inconsistent responses      Otoscopic inspection: right ear no cerumen; left ear no cerumen.        Tests/P

## 2018-04-04 NOTE — PROGRESS NOTES
Cindi Collins is a 45year old female. Patient presents with:  Ringing In Ear: ringing in the right ear for a year      HISTORY OF PRESENT ILLNESS  5/8/2017  Patient prevents for evaluation of suspected sinusitis. This is not recurrent.  Recent symptoms st reflux    • HYPERTENSION    • Hypertension    • Unspecified essential hypertension        Past Surgical History:  2007: ENDOMETRIAL ABLATION  No date:       Comment: 11: OTHER SURGICAL HISTORY      Comment: cysto-Dr. Annette Gallardo -- pt denies      R cervical. Supraclavicular. Nose/Mouth/Throat abNormal Nares - Right: Normal Left: Normal. Septum deviated with turbinate hypertrophy bilaterally mucosa congestion.  Polyps are not noted in neither nasal cavity       Current Outpatient Prescript

## 2018-04-11 ENCOUNTER — TELEPHONE (OUTPATIENT)
Dept: OTOLARYNGOLOGY | Facility: CLINIC | Age: 53
End: 2018-04-11

## 2018-04-11 NOTE — TELEPHONE ENCOUNTER
Called pt's insurance [phone], spoke with Escobar Kiran, authorization for MRI Ania Guthrie has been approved, authorization number [id number], valid 4-11-18 thru 5-26-18. Spoke with pt and informed of above authorization information, pt verbalized understanding.

## 2018-04-22 ENCOUNTER — HOSPITAL ENCOUNTER (OUTPATIENT)
Dept: MRI IMAGING | Age: 53
Discharge: HOME | End: 2018-04-22
Attending: OTOLARYNGOLOGY
Payer: COMMERCIAL

## 2018-04-22 DIAGNOSIS — H93.11 RINGING IN EAR, RIGHT: ICD-10-CM

## 2018-04-22 PROCEDURE — A9576 INJ PROHANCE MULTIPACK: HCPCS | Performed by: OTOLARYNGOLOGY

## 2018-04-22 PROCEDURE — 82565 ASSAY OF CREATININE: CPT

## 2018-04-22 PROCEDURE — 70553 MRI BRAIN STEM W/O & W/DYE: CPT | Performed by: OTOLARYNGOLOGY

## 2018-06-01 ENCOUNTER — TELEPHONE (OUTPATIENT)
Dept: GASTROENTEROLOGY | Facility: CLINIC | Age: 53
End: 2018-06-01

## 2018-06-01 NOTE — TELEPHONE ENCOUNTER
Pt called back asked if what she could eat as she forgot her prep instructions at home and is already at work. I notified her that since her procedure is tomorrow she can only have a LIGHT breakfast (cereal/banana OR eggs/toast).    But for lunch and di

## 2018-06-01 NOTE — TELEPHONE ENCOUNTER
Pt has CLN scheduled for tomorrow and has lost prep instructions for procedure. Pt would like to know what she can and can not eat.  Please call thank you [phone]

## 2018-06-02 ENCOUNTER — SURGERY (OUTPATIENT)
Age: 53
End: 2018-06-02

## 2018-06-02 ENCOUNTER — HOSPITAL ENCOUNTER (OUTPATIENT)
Facility: HOSPITAL | Age: 53
Setting detail: HOSPITAL OUTPATIENT SURGERY
Discharge: HOME | End: 2018-06-02
Attending: INTERNAL MEDICINE | Admitting: INTERNAL MEDICINE
Payer: COMMERCIAL

## 2018-06-02 DIAGNOSIS — Z12.11 COLON CANCER SCREENING: ICD-10-CM

## 2018-06-02 PROCEDURE — 0DBN8ZX EXCISION OF SIGMOID COLON, VIA NATURAL OR ARTIFICIAL OPENING ENDOSCOPIC, DIAGNOSTIC: ICD-10-PCS | Performed by: INTERNAL MEDICINE

## 2018-06-02 PROCEDURE — 0DBH8ZX EXCISION OF CECUM, VIA NATURAL OR ARTIFICIAL OPENING ENDOSCOPIC, DIAGNOSTIC: ICD-10-PCS | Performed by: INTERNAL MEDICINE

## 2018-06-02 PROCEDURE — 0DBL8ZX EXCISION OF TRANSVERSE COLON, VIA NATURAL OR ARTIFICIAL OPENING ENDOSCOPIC, DIAGNOSTIC: ICD-10-PCS | Performed by: INTERNAL MEDICINE

## 2018-06-02 PROCEDURE — 45385 COLONOSCOPY W/LESION REMOVAL: CPT | Performed by: INTERNAL MEDICINE

## 2018-06-02 PROCEDURE — 0DBK8ZX EXCISION OF ASCENDING COLON, VIA NATURAL OR ARTIFICIAL OPENING ENDOSCOPIC, DIAGNOSTIC: ICD-10-PCS | Performed by: INTERNAL MEDICINE

## 2018-06-02 PROCEDURE — 0DBP8ZX EXCISION OF RECTUM, VIA NATURAL OR ARTIFICIAL OPENING ENDOSCOPIC, DIAGNOSTIC: ICD-10-PCS | Performed by: INTERNAL MEDICINE

## 2018-06-02 PROCEDURE — G0500 MOD SEDAT ENDO SERVICE >5YRS: HCPCS | Performed by: INTERNAL MEDICINE

## 2018-06-02 RX ORDER — MIDAZOLAM HYDROCHLORIDE 1 MG/ML
1 INJECTION INTRAMUSCULAR; INTRAVENOUS EVERY 5 MIN PRN
Status: DISCONTINUED | OUTPATIENT
Start: 2018-06-02 | End: 2018-06-02

## 2018-06-02 RX ORDER — MIDAZOLAM HYDROCHLORIDE 1 MG/ML
INJECTION INTRAMUSCULAR; INTRAVENOUS
Status: DISCONTINUED | OUTPATIENT
Start: 2018-06-02 | End: 2018-06-02

## 2018-06-02 RX ORDER — SODIUM CHLORIDE, SODIUM LACTATE, POTASSIUM CHLORIDE, CALCIUM CHLORIDE 600; 310; 30; 20 MG/100ML; MG/100ML; MG/100ML; MG/100ML
INJECTION, SOLUTION INTRAVENOUS CONTINUOUS
Status: DISCONTINUED | OUTPATIENT
Start: 2018-06-02 | End: 2018-06-02

## 2018-06-02 RX ORDER — SODIUM CHLORIDE 0.9 % (FLUSH) 0.9 %
10 SYRINGE (ML) INJECTION AS NEEDED
Status: DISCONTINUED | OUTPATIENT
Start: 2018-06-02 | End: 2018-06-02

## 2018-06-02 NOTE — OPERATIVE REPORT
[facility] Endoscopy Report      Date of Procedure:  06/02/18      Preoperative Diagnosis:  Colorectal cancer screening      Postoperative Diagnosis:  1. Colon polyps  2.   Diverticulosis left colon      Procedure:    Colonoscopy with polyp colon there was a 4-5 mm sessile polyp which was cold snare excised and retrieved via suction. 4.  In the distal sigmoid colon there was a 6-7 mm sessile polyp which was cold snare excised and retrieved via suction.   5.  In the rectum there were #3 polyps

## 2018-06-03 NOTE — H&P
History & Physical Examination    Patient Name: Diane Rasheed  MRN: [id number]  [facility]: [id number]  YOB: 1965    Diagnosis: Colorectal cancer screening        No prescriptions prior to admission.     No current facility-administered medications

## 2018-06-04 VITALS
HEART RATE: 78 BPM | OXYGEN SATURATION: 99 % | BODY MASS INDEX: 39.99 KG/M2 | SYSTOLIC BLOOD PRESSURE: 145 MMHG | WEIGHT: 240 LBS | RESPIRATION RATE: 17 BRPM | DIASTOLIC BLOOD PRESSURE: 94 MMHG | HEIGHT: 65 IN

## 2018-06-08 ENCOUNTER — TELEPHONE (OUTPATIENT)
Dept: GASTROENTEROLOGY | Facility: CLINIC | Age: 53
End: 2018-06-08

## 2018-06-08 NOTE — TELEPHONE ENCOUNTER
----- Message from Capri Mercer MD sent at 6/7/2018  7:39 PM CDT -----  I spoke to the patient. She is feeling well. She had #7 polyps removed. #3 of the polyps were subcentimeter adenomas. The remainder were hyperplastic.   I have discussed the

## 2018-06-10 RX ORDER — AMLODIPINE BESYLATE 10 MG/1
10 TABLET ORAL
Qty: 90 TABLET | Refills: 0 | Status: SHIPPED | OUTPATIENT
Start: 2018-06-10 | End: 2018-06-15

## 2018-06-10 NOTE — TELEPHONE ENCOUNTER
Hypertensive Medications:Refilled per protocol    Protocol Criteria:  · Appointment scheduled in the past 6 months or in the next 3 months  · BMP or CMP in the past 12 months  · Creatinine result < 2  Recent Outpatient Visits            2 months ago [place]

## 2018-06-15 RX ORDER — LOSARTAN POTASSIUM AND HYDROCHLOROTHIAZIDE 12.5; 5 MG/1; MG/1
1 TABLET ORAL DAILY
Qty: 90 TABLET | Refills: 0 | Status: CANCELLED | OUTPATIENT
Start: 2018-06-15

## 2018-06-15 NOTE — TELEPHONE ENCOUNTER
Patient calling asking for refill on her cardiac medications. Patient stated pharmacy has also been requesting. Please advise protocol did not come through.       Hypertensive Medications  Protocol Criteria:  · Appointment scheduled in the past 6 months

## 2018-06-16 ENCOUNTER — TELEPHONE (OUTPATIENT)
Dept: OTHER | Age: 53
End: 2018-06-16

## 2018-06-16 RX ORDER — LOSARTAN POTASSIUM AND HYDROCHLOROTHIAZIDE 12.5; 5 MG/1; MG/1
1 TABLET ORAL DAILY
Qty: 90 TABLET | Refills: 0 | Status: SHIPPED | OUTPATIENT
Start: 2018-06-16 | End: 2018-07-18

## 2018-06-16 NOTE — TELEPHONE ENCOUNTER
Losartan Potassium-hctz 50-12.5 mg refilled for 3 months. Pt to call [facility] pharmacy at [facility] to get amlodipine 10 mg script from [facility] in [place] transferred to preferred pharmacy location.      Hypertensive Medications  Protocol Criteria:  · Appointme

## 2018-06-18 RX ORDER — AMLODIPINE BESYLATE 10 MG/1
10 TABLET ORAL
Qty: 90 TABLET | Refills: 0 | Status: SHIPPED | OUTPATIENT
Start: 2018-06-18 | End: 2018-07-18

## 2018-07-05 ENCOUNTER — LAB ENCOUNTER (OUTPATIENT)
Dept: LAB | Age: 53
End: 2018-07-05
Attending: NURSE PRACTITIONER
Payer: COMMERCIAL

## 2018-07-05 ENCOUNTER — OFFICE VISIT (OUTPATIENT)
Dept: FAMILY MEDICINE CLINIC | Facility: CLINIC | Age: 53
End: 2018-07-05

## 2018-07-05 ENCOUNTER — NURSE TRIAGE (OUTPATIENT)
Dept: FAMILY MEDICINE CLINIC | Facility: CLINIC | Age: 53
End: 2018-07-05

## 2018-07-05 VITALS
SYSTOLIC BLOOD PRESSURE: 136 MMHG | WEIGHT: 237 LBS | HEART RATE: 82 BPM | HEIGHT: 65 IN | BODY MASS INDEX: 39.49 KG/M2 | DIASTOLIC BLOOD PRESSURE: 84 MMHG

## 2018-07-05 DIAGNOSIS — R31.29 MICROSCOPIC HEMATURIA: ICD-10-CM

## 2018-07-05 DIAGNOSIS — R30.0 DYSURIA: Primary | ICD-10-CM

## 2018-07-05 DIAGNOSIS — Z11.3 SCREENING EXAMINATION FOR STD (SEXUALLY TRANSMITTED DISEASE): ICD-10-CM

## 2018-07-05 DIAGNOSIS — E66.01 MORBID OBESITY WITH BMI OF 40.0-44.9, ADULT (HCC): ICD-10-CM

## 2018-07-05 LAB
APPEARANCE: CLEAR
BILIRUBIN: NEGATIVE
GLUCOSE (URINE DIPSTICK): NEGATIVE MG/DL
KETONES (URINE DIPSTICK): NEGATIVE MG/DL
MULTISTIX LOT#: ABNORMAL NUMERIC
NITRITE, URINE: NEGATIVE
OCCULT BLOOD: NEGATIVE
PH, URINE: 7 (ref 4.5–8)
PROTEIN (URINE DIPSTICK): 30 MG/DL
SPECIFIC GRAVITY: 1.02 (ref 1–1.03)
URINE-COLOR: YELLOW
UROBILINOGEN,SEMI-QN: 2 MG/DL (ref 0–1.9)

## 2018-07-05 PROCEDURE — 86695 HERPES SIMPLEX TYPE 1 TEST: CPT

## 2018-07-05 PROCEDURE — 80074 ACUTE HEPATITIS PANEL: CPT

## 2018-07-05 PROCEDURE — 81003 URINALYSIS AUTO W/O SCOPE: CPT | Performed by: NURSE PRACTITIONER

## 2018-07-05 PROCEDURE — 36415 COLL VENOUS BLD VENIPUNCTURE: CPT

## 2018-07-05 PROCEDURE — 87389 HIV-1 AG W/HIV-1&-2 AB AG IA: CPT

## 2018-07-05 PROCEDURE — 86696 HERPES SIMPLEX TYPE 2 TEST: CPT

## 2018-07-05 PROCEDURE — 87491 CHLMYD TRACH DNA AMP PROBE: CPT

## 2018-07-05 PROCEDURE — 87591 N.GONORRHOEAE DNA AMP PROB: CPT

## 2018-07-05 PROCEDURE — 99214 OFFICE O/P EST MOD 30 MIN: CPT | Performed by: NURSE PRACTITIONER

## 2018-07-05 PROCEDURE — 87086 URINE CULTURE/COLONY COUNT: CPT

## 2018-07-05 PROCEDURE — 86694 HERPES SIMPLEX NES ANTBDY: CPT

## 2018-07-05 PROCEDURE — 86780 TREPONEMA PALLIDUM: CPT

## 2018-07-05 RX ORDER — NITROFURANTOIN 25; 75 MG/1; MG/1
100 CAPSULE ORAL 2 TIMES DAILY
Qty: 14 CAPSULE | Refills: 0 | Status: SHIPPED | OUTPATIENT
Start: 2018-07-05 | End: 2018-07-18 | Stop reason: ALTCHOICE

## 2018-07-05 NOTE — PATIENT INSTRUCTIONS
URINARY TRACT INFECTION    -encourage fluids 8-12 glasses of non-caffeinated fluids/day  -encourage drinking cranberry juice  -urinate after intercourse  -keep good hygiene, avoid harsh soaps and lotions to perineal area  -females-wipe front to back  -joao

## 2018-07-05 NOTE — PROGRESS NOTES
HPI  Pt here for urinary frequency for past 3 weeks. Has been pushing fluids. Denies fever, flank pain, hematuria. bp is running high at home when checking at [facility].  Would like to have labs checked for stds.   Review of Systems   Constitutional: Negative History Narrative   None on file         Current Outpatient Prescriptions:  Nitrofurantoin Monohyd Macro 100 MG Oral Cap Take 1 capsule (100 mg total) by mouth 2 (two) times daily.  Disp: 14 capsule Rfl: 0   AmLODIPine Besylate 10 MG Oral Tab Take 1 tablet List Items Addressed This Visit        Unprioritized    Microscopic hematuria     Low sodium diet  con't monitor bp at home  F/u in 2 weeks for bp check         Morbid obesity with BMI of 40.0-44.9, adult ([address]    Dysuria - Primary    Relevant Medications

## 2018-07-05 NOTE — TELEPHONE ENCOUNTER
Action Requested: Summary for Provider     []  Critical Lab, Recommendations Needed  [] Need Additional Advice  []   FYI    []   Need Orders  [] Need Medications Sent to Pharmacy  []  Other     SUMMARY: Per protocol advised OV now but pt reluctant to see a

## 2018-07-06 LAB
C TRACH DNA SPEC QL NAA+PROBE: NEGATIVE
HAV IGM SERPL QL IA: NONREACTIVE
HBV CORE IGM SERPL QL IA: NONREACTIVE
HBV SURFACE AG SERPL QL IA: NONREACTIVE
HCV AB SERPL QL IA: NONREACTIVE
HIV1+2 AB SERPL QL IA: NONREACTIVE
N GONORRHOEA DNA SPEC QL NAA+PROBE: NEGATIVE
T PALLIDUM AB SER QL: NEGATIVE

## 2018-07-07 LAB
HSV 1 GLYCOPROTEIN G AB, IGG: 39.6 IV
HSV 2 GLYCOPROTEIN G AB, IGG: 0.08 IV

## 2018-07-08 LAB
HSV TYPE 1/2 COMBINED AB, IGG: >22.4 IV
HSV TYPE 1/2 COMBINED ABS, IGM: 0.45 IV

## 2018-07-11 ENCOUNTER — TELEPHONE (OUTPATIENT)
Dept: OTHER | Age: 53
End: 2018-07-11

## 2018-07-11 NOTE — TELEPHONE ENCOUNTER
Patient upset about the herpes I; advised to talk with Dr Marilou Guzman about this, reassured that it is the oral version. Scheduled to see Dr Marilou Guzman 7/18/18 at 4:45 pm, cancelled the other appt for her.

## 2018-07-13 ENCOUNTER — NURSE TRIAGE (OUTPATIENT)
Dept: FAMILY MEDICINE CLINIC | Facility: CLINIC | Age: 53
End: 2018-07-13

## 2018-07-13 RX ORDER — FLUCONAZOLE 200 MG/1
TABLET ORAL
Qty: 2 TABLET | Refills: 0 | Status: SHIPPED | OUTPATIENT
Start: 2018-07-13 | End: 2018-12-18 | Stop reason: ALTCHOICE

## 2018-07-13 NOTE — TELEPHONE ENCOUNTER
Action Requested: Summary for Provider     []  Critical Lab, Recommendations Needed  [x] Need Additional Advice  []   FYI    []   Need Orders  [] Need Medications Sent to Pharmacy  []  Other     SUMMARY: Advised home care as per protocol and advised to con

## 2018-07-13 NOTE — TELEPHONE ENCOUNTER
Verified pt name and . Reviewed medication instructions with pt per Toma's instructions. Pt agreed with plan of care and had no further questions at this time.

## 2018-07-18 ENCOUNTER — OFFICE VISIT (OUTPATIENT)
Dept: FAMILY MEDICINE CLINIC | Facility: CLINIC | Age: 53
End: 2018-07-18
Payer: COMMERCIAL

## 2018-07-18 VITALS
WEIGHT: 241 LBS | HEIGHT: 65 IN | BODY MASS INDEX: 40.15 KG/M2 | SYSTOLIC BLOOD PRESSURE: 144 MMHG | TEMPERATURE: 98 F | DIASTOLIC BLOOD PRESSURE: 87 MMHG | HEART RATE: 81 BPM

## 2018-07-18 DIAGNOSIS — I10 ESSENTIAL HYPERTENSION: ICD-10-CM

## 2018-07-18 DIAGNOSIS — E78.00 HYPERCHOLESTEREMIA: ICD-10-CM

## 2018-07-18 DIAGNOSIS — R20.2 TINGLING IN EXTREMITIES: ICD-10-CM

## 2018-07-18 DIAGNOSIS — E66.01 MORBID OBESITY WITH BMI OF 40.0-44.9, ADULT (HCC): ICD-10-CM

## 2018-07-18 DIAGNOSIS — B00.9 HERPES SIMPLEX TYPE 1 ANTIBODY POSITIVE: Primary | ICD-10-CM

## 2018-07-18 DIAGNOSIS — T74.31XD ADULT VICTIM OF PSYCHOLOGICAL BULLYING, SUBSEQUENT ENCOUNTER: ICD-10-CM

## 2018-07-18 DIAGNOSIS — F10.20 ALCOHOLISM (HCC): ICD-10-CM

## 2018-07-18 PROCEDURE — 99212 OFFICE O/P EST SF 10 MIN: CPT | Performed by: FAMILY MEDICINE

## 2018-07-18 PROCEDURE — 99214 OFFICE O/P EST MOD 30 MIN: CPT | Performed by: FAMILY MEDICINE

## 2018-07-18 RX ORDER — LOSARTAN POTASSIUM AND HYDROCHLOROTHIAZIDE 12.5; 5 MG/1; MG/1
1 TABLET ORAL DAILY
Qty: 90 TABLET | Refills: 3 | Status: SHIPPED | OUTPATIENT
Start: 2018-07-18 | End: 2019-06-10

## 2018-07-18 RX ORDER — AMLODIPINE BESYLATE 10 MG/1
10 TABLET ORAL
Qty: 90 TABLET | Refills: 3 | Status: SHIPPED | OUTPATIENT
Start: 2018-07-18 | End: 2019-06-10

## 2018-07-18 NOTE — PROGRESS NOTES
HPI:    Patient ID: Ghazala Vargas is a 45year old female. HPI     Patient here for follow-up to discuss test results. Patient states she got the result regarding herpes and was very concerned. She has not been socially active for the last 1 month.   Jason Lindsay normal. She has no wheezes. Musculoskeletal: She exhibits no edema. Neurological: She is alert and oriented to person, place, and time. Skin: Skin is warm and dry. No rash noted. Psychiatric: Her mood appears not anxious.  She exhibits a depressed m

## 2018-07-23 ENCOUNTER — LAB ENCOUNTER (OUTPATIENT)
Dept: LAB | Age: 53
End: 2018-07-23
Attending: FAMILY MEDICINE
Payer: COMMERCIAL

## 2018-07-23 DIAGNOSIS — R20.2 TINGLING IN EXTREMITIES: ICD-10-CM

## 2018-07-23 DIAGNOSIS — F10.20 ALCOHOLISM (HCC): ICD-10-CM

## 2018-07-23 DIAGNOSIS — H93.8X1 MASS OF RIGHT EAR: ICD-10-CM

## 2018-07-23 DIAGNOSIS — R41.840 DISTURBED CONCENTRATION: ICD-10-CM

## 2018-07-23 DIAGNOSIS — E78.00 HYPERCHOLESTEREMIA: ICD-10-CM

## 2018-07-23 DIAGNOSIS — E66.01 MORBID OBESITY WITH BMI OF 40.0-44.9, ADULT (HCC): ICD-10-CM

## 2018-07-23 DIAGNOSIS — I10 ESSENTIAL HYPERTENSION: ICD-10-CM

## 2018-07-23 LAB
ALBUMIN SERPL BCP-MCNC: 4.1 G/DL (ref 3.5–4.8)
ALBUMIN/GLOB SERPL: 1.2 {RATIO} (ref 1–2)
ALP SERPL-CCNC: 76 U/L (ref 32–100)
ALT SERPL-CCNC: 26 U/L (ref 14–54)
ANION GAP SERPL CALC-SCNC: 9 MMOL/L (ref 0–18)
AST SERPL-CCNC: 23 U/L (ref 15–41)
BASOPHILS # BLD: 0 K/UL (ref 0–0.2)
BASOPHILS NFR BLD: 0 %
BILIRUB SERPL-MCNC: 0.4 MG/DL (ref 0.3–1.2)
BUN SERPL-MCNC: 19 MG/DL (ref 8–20)
BUN/CREAT SERPL: 19.6 (ref 10–20)
CALCIUM SERPL-MCNC: 9.7 MG/DL (ref 8.5–10.5)
CHLORIDE SERPL-SCNC: 106 MMOL/L (ref 95–110)
CHOLEST SERPL-MCNC: 215 MG/DL (ref 110–200)
CO2 SERPL-SCNC: 27 MMOL/L (ref 22–32)
CREAT SERPL-MCNC: 0.97 MG/DL (ref 0.5–1.5)
EOSINOPHIL # BLD: 0.1 K/UL (ref 0–0.7)
EOSINOPHIL NFR BLD: 2 %
ERYTHROCYTE [DISTWIDTH] IN BLOOD BY AUTOMATED COUNT: 15.3 % (ref 11–15)
ERYTHROCYTE [SEDIMENTATION RATE] IN BLOOD: 23 MM/HR (ref 0–30)
GLOBULIN PLAS-MCNC: 3.3 G/DL (ref 2.5–3.7)
GLUCOSE SERPL-MCNC: 104 MG/DL (ref 70–99)
HCT VFR BLD AUTO: 42.7 % (ref 35–48)
HDLC SERPL-MCNC: 58 MG/DL
HGB BLD-MCNC: 13.7 G/DL (ref 12–16)
LDLC SERPL CALC-MCNC: 144 MG/DL (ref 0–99)
LYMPHOCYTES # BLD: 2.3 K/UL (ref 1–4)
LYMPHOCYTES NFR BLD: 36 %
MCH RBC QN AUTO: 26.6 PG (ref 27–32)
MCHC RBC AUTO-ENTMCNC: 32 G/DL (ref 32–37)
MCV RBC AUTO: 83.3 FL (ref 80–100)
MONOCYTES # BLD: 0.5 K/UL (ref 0–1)
MONOCYTES NFR BLD: 7 %
NEUTROPHILS # BLD AUTO: 3.4 K/UL (ref 1.8–7.7)
NEUTROPHILS NFR BLD: 54 %
NONHDLC SERPL-MCNC: 157 MG/DL
OSMOLALITY UR CALC.SUM OF ELEC: 297 MOSM/KG (ref 275–295)
PATIENT FASTING: YES
PLATELET # BLD AUTO: 263 K/UL (ref 140–400)
PMV BLD AUTO: 10.1 FL (ref 7.4–10.3)
POTASSIUM SERPL-SCNC: 3.6 MMOL/L (ref 3.3–5.1)
PROT SERPL-MCNC: 7.4 G/DL (ref 5.9–8.4)
RBC # BLD AUTO: 5.13 M/UL (ref 3.7–5.4)
SODIUM SERPL-SCNC: 142 MMOL/L (ref 136–144)
TRIGL SERPL-MCNC: 66 MG/DL (ref 1–149)
TSH SERPL-ACNC: 0.82 UIU/ML (ref 0.45–5.33)
VIT B12 SERPL-MCNC: 1156 PG/ML (ref 181–914)
WBC # BLD AUTO: 6.3 K/UL (ref 4–11)

## 2018-07-23 PROCEDURE — 83036 HEMOGLOBIN GLYCOSYLATED A1C: CPT

## 2018-07-23 PROCEDURE — 82607 VITAMIN B-12: CPT

## 2018-07-23 PROCEDURE — 80053 COMPREHEN METABOLIC PANEL: CPT

## 2018-07-23 PROCEDURE — 84443 ASSAY THYROID STIM HORMONE: CPT

## 2018-07-23 PROCEDURE — 80061 LIPID PANEL: CPT

## 2018-07-23 PROCEDURE — 85652 RBC SED RATE AUTOMATED: CPT

## 2018-07-23 PROCEDURE — 36415 COLL VENOUS BLD VENIPUNCTURE: CPT

## 2018-07-23 PROCEDURE — 85025 COMPLETE CBC W/AUTO DIFF WBC: CPT

## 2018-07-24 LAB — HBA1C MFR BLD: 6 % (ref 4–6)

## 2018-08-02 ENCOUNTER — NURSE TRIAGE (OUTPATIENT)
Dept: OTHER | Age: 53
End: 2018-08-02

## 2018-08-03 NOTE — TELEPHONE ENCOUNTER
Action Requested: Summary for Provider     []  Critical Lab, Recommendations Needed  [] Need Additional Advice  []   FYI    []   Need Orders  [] Need Medications Sent to Pharmacy  []  Other     SUMMARY: Patient reports having intermittent chest pain every

## 2018-08-03 NOTE — TELEPHONE ENCOUNTER
Pt contacted (Name and  verified) and states that she is still thinking about going to the IC as advised some time today. Pt denies current Sx of chest pain and states that the Sx are intermittent and that she feels like it \"gas pains\".     Pt states t

## 2018-08-03 NOTE — TELEPHONE ENCOUNTER
----- Message from Kia Sheldon MD sent at 8/1/2018 10:49 AM CDT -----  Labs all good other than cholesterol elevated. No Diabetes mellitus   Recommend low dose cholesterol medication (statin) and to lose weight/ low fat diet.   Recommend rechecking cholest

## 2018-08-08 ENCOUNTER — TELEPHONE (OUTPATIENT)
Dept: OTHER | Age: 53
End: 2018-08-08

## 2018-08-20 ENCOUNTER — OFFICE VISIT (OUTPATIENT)
Dept: FAMILY MEDICINE CLINIC | Facility: CLINIC | Age: 53
End: 2018-08-20
Payer: COMMERCIAL

## 2018-08-20 VITALS
HEART RATE: 90 BPM | DIASTOLIC BLOOD PRESSURE: 77 MMHG | HEIGHT: 65 IN | SYSTOLIC BLOOD PRESSURE: 120 MMHG | WEIGHT: 237 LBS | TEMPERATURE: 99 F | BODY MASS INDEX: 39.49 KG/M2

## 2018-08-20 DIAGNOSIS — Z12.31 ENCOUNTER FOR SCREENING MAMMOGRAM FOR BREAST CANCER: ICD-10-CM

## 2018-08-20 DIAGNOSIS — I10 ESSENTIAL HYPERTENSION: ICD-10-CM

## 2018-08-20 DIAGNOSIS — M94.0 COSTOCHONDRITIS: ICD-10-CM

## 2018-08-20 DIAGNOSIS — E78.00 HYPERCHOLESTEREMIA: ICD-10-CM

## 2018-08-20 DIAGNOSIS — R07.89 LEFT CHEST PRESSURE: Primary | ICD-10-CM

## 2018-08-20 PROCEDURE — 99214 OFFICE O/P EST MOD 30 MIN: CPT | Performed by: FAMILY MEDICINE

## 2018-08-20 PROCEDURE — 99212 OFFICE O/P EST SF 10 MIN: CPT | Performed by: FAMILY MEDICINE

## 2018-08-20 NOTE — PROGRESS NOTES
HPI:    Patient ID: Crissy Smith is a 45year old female. HPI     Patient presents with:  Chest Pain: X 3 weeks on and off     Patient here with c/o left sided CP on and off. Sometimes hurts when she presses on it. No sob. Sometimes gets dizzy.   C Left chest pressure  (primary encounter diagnosis)  Costochondritis  Essential hypertension  Hypercholesteremia  Encounter for screening mammogram for breast cancer    1. Left chest pressure    - EKG 12-LEAD;  Future  - XR CHEST PA + LAT CHEST (CPT=71046)

## 2018-09-12 RX ORDER — AMLODIPINE BESYLATE 10 MG/1
TABLET ORAL
Qty: 90 TABLET | Refills: 0 | OUTPATIENT
Start: 2018-09-12

## 2018-09-12 NOTE — TELEPHONE ENCOUNTER
Pharmacy   [id number] [address], [phone]   Medication Detail    Disp Refills Start End    AmLODIPine Besylate 10 MG Oral Tab 90 tablet 3 7/18/2018     Sig - Route:  Take 1 tablet (10 mg total) b

## 2018-09-13 RX ORDER — AMLODIPINE BESYLATE 10 MG/1
TABLET ORAL
Qty: 90 TABLET | Refills: 0 | OUTPATIENT
Start: 2018-09-13

## 2018-09-13 NOTE — TELEPHONE ENCOUNTER
Pharmacy     [id number], [phone]   Medication Detail      Disp Refills Start End    AmLODIPine Besylate 10 MG Oral Tab 90 tablet 3 7/18/2018     Sig - Route:  Take 1 tablet (10 mg tota

## 2018-09-14 RX ORDER — AMLODIPINE BESYLATE 10 MG/1
TABLET ORAL
Qty: 90 TABLET | Refills: 0 | OUTPATIENT
Start: 2018-09-14

## 2018-09-15 RX ORDER — AMLODIPINE BESYLATE 10 MG/1
TABLET ORAL
Qty: 90 TABLET | Refills: 0 | OUTPATIENT
Start: 2018-09-15

## 2018-10-11 ENCOUNTER — TELEPHONE (OUTPATIENT)
Dept: OTHER | Age: 53
End: 2018-10-11

## 2018-10-11 DIAGNOSIS — Z11.4 SCREENING FOR HIV (HUMAN IMMUNODEFICIENCY VIRUS): Primary | ICD-10-CM

## 2018-10-11 NOTE — TELEPHONE ENCOUNTER
Pt requesting HIV test -stated she knew her partner has HIV for many years and they have protected sex but it still crosses her mine she might be infected
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
## 2018-10-13 ENCOUNTER — APPOINTMENT (OUTPATIENT)
Dept: CT IMAGING | Facility: HOSPITAL | Age: 53
End: 2018-10-13
Attending: EMERGENCY MEDICINE
Payer: COMMERCIAL

## 2018-10-13 ENCOUNTER — HOSPITAL ENCOUNTER (EMERGENCY)
Facility: HOSPITAL | Age: 53
Discharge: HOME | End: 2018-10-13
Attending: EMERGENCY MEDICINE
Payer: COMMERCIAL

## 2018-10-13 VITALS
WEIGHT: 222 LBS | OXYGEN SATURATION: 99 % | BODY MASS INDEX: 36.99 KG/M2 | TEMPERATURE: 98 F | HEIGHT: 65 IN | HEART RATE: 96 BPM | DIASTOLIC BLOOD PRESSURE: 98 MMHG | SYSTOLIC BLOOD PRESSURE: 143 MMHG | RESPIRATION RATE: 18 BRPM

## 2018-10-13 DIAGNOSIS — I15.9 SECONDARY HYPERTENSION: Primary | ICD-10-CM

## 2018-10-13 PROCEDURE — 99283 EMERGENCY DEPT VISIT LOW MDM: CPT

## 2018-10-14 NOTE — ED NOTES
Pt refuses CT of head, states she does not want to take out her deidre pins. MD notified. Pt requests BP and HA meds.

## 2018-10-14 NOTE — ED PROVIDER NOTES
Patient Seen in: [facility] AND [facility] Emergency Department    History   Patient presents with:  Headache (neurologic)    Stated Complaint:     HPI    45year old female with a history of alcohol abuse and hypertension, who is worried about janusz HIV fro Systems    Positive for stated complaint:   Other systems are as noted in HPI. Constitutional and vital signs reviewed. All other systems reviewed and negative except as noted above.     [facility] and agreed except as otherwise reviewed.             ED Course   Nursing notes and Triage vitals reviewed  Labs Reviewed - No data to display         Imaging Results Available and Reviewed while here: No orders to display    ED Medications Administered: Medications - No data to display

## 2018-10-15 ENCOUNTER — NURSE TRIAGE (OUTPATIENT)
Dept: FAMILY MEDICINE CLINIC | Facility: CLINIC | Age: 53
End: 2018-10-15

## 2018-10-15 NOTE — TELEPHONE ENCOUNTER
Action Requested: Summary for Provider     []  Critical Lab, Recommendations Needed  [] Need Additional Advice  []   FYI    []   Need Orders  [] Need Medications Sent to Pharmacy  []  Other     SUMMARY: patient requesting appt for headache, 2/10, post nasa

## 2018-10-15 NOTE — TELEPHONE ENCOUNTER
Informed pt doctor placed order as requested, pt states she will have lab work done when she goes to her appt on 10/16/18.

## 2018-10-16 ENCOUNTER — OFFICE VISIT (OUTPATIENT)
Dept: FAMILY MEDICINE CLINIC | Facility: CLINIC | Age: 53
End: 2018-10-16
Payer: COMMERCIAL

## 2018-10-16 VITALS
DIASTOLIC BLOOD PRESSURE: 86 MMHG | HEART RATE: 94 BPM | TEMPERATURE: 99 F | WEIGHT: 238 LBS | BODY MASS INDEX: 39.65 KG/M2 | SYSTOLIC BLOOD PRESSURE: 137 MMHG | HEIGHT: 65 IN

## 2018-10-16 DIAGNOSIS — J01.00 ACUTE NON-RECURRENT MAXILLARY SINUSITIS: Primary | ICD-10-CM

## 2018-10-16 PROCEDURE — 99213 OFFICE O/P EST LOW 20 MIN: CPT | Performed by: FAMILY MEDICINE

## 2018-10-16 PROCEDURE — 99212 OFFICE O/P EST SF 10 MIN: CPT | Performed by: FAMILY MEDICINE

## 2018-10-16 RX ORDER — AMOXICILLIN 875 MG/1
875 TABLET, COATED ORAL 2 TIMES DAILY
Qty: 20 TABLET | Refills: 0 | Status: SHIPPED | OUTPATIENT
Start: 2018-10-16 | End: 2018-10-26

## 2018-10-16 RX ORDER — FLUTICASONE PROPIONATE 50 MCG
2 SPRAY, SUSPENSION (ML) NASAL NIGHTLY
Qty: 1 BOTTLE | Refills: 3 | Status: SHIPPED | OUTPATIENT
Start: 2018-10-16 | End: 2019-09-09

## 2018-10-16 NOTE — PROGRESS NOTES
HPI:   Nicol Quiñonez is a 45year old female who presents for upper respiratory symptoms for  3  weeks. Patient reports congestion, sinus pain, prior history of sinusitis.   Patient presents with:  Sinusitis: X 3 weeks getting worst   Headache    Taking all exertion  CARDIOVASCULAR: denies chest pain on exertion  GI: no nausea or abdominal pain  NEURO: denies headaches    EXAM:   /86   Pulse 94   Temp 98.6 °F (37 °C) (Oral)   Ht 5' 5\" (1.651 m)   Wt 238 lb (108 kg)   BMI 39.61 kg/m²   GENERAL: well dev

## 2018-12-18 ENCOUNTER — OFFICE VISIT (OUTPATIENT)
Dept: FAMILY MEDICINE CLINIC | Facility: CLINIC | Age: 53
End: 2018-12-18
Payer: COMMERCIAL

## 2018-12-18 VITALS
HEART RATE: 85 BPM | TEMPERATURE: 98 F | SYSTOLIC BLOOD PRESSURE: 138 MMHG | DIASTOLIC BLOOD PRESSURE: 82 MMHG | HEIGHT: 65 IN | WEIGHT: 246 LBS | BODY MASS INDEX: 40.98 KG/M2

## 2018-12-18 DIAGNOSIS — R05.9 COUGH: ICD-10-CM

## 2018-12-18 DIAGNOSIS — J06.9 UPPER RESPIRATORY TRACT INFECTION, UNSPECIFIED TYPE: Primary | ICD-10-CM

## 2018-12-18 PROCEDURE — 99212 OFFICE O/P EST SF 10 MIN: CPT | Performed by: FAMILY MEDICINE

## 2018-12-18 PROCEDURE — 99213 OFFICE O/P EST LOW 20 MIN: CPT | Performed by: FAMILY MEDICINE

## 2018-12-18 RX ORDER — GUAIFENESIN AND CODEINE PHOSPHATE 100; 10 MG/5ML; MG/5ML
5 SOLUTION ORAL NIGHTLY PRN
Qty: 118 ML | Refills: 0 | Status: SHIPPED
Start: 2018-12-18 | End: 2019-01-01

## 2018-12-18 RX ORDER — AZITHROMYCIN 250 MG/1
TABLET, FILM COATED ORAL
Qty: 6 TABLET | Refills: 0 | Status: SHIPPED | OUTPATIENT
Start: 2018-12-18 | End: 2019-01-29 | Stop reason: ALTCHOICE

## 2018-12-18 NOTE — PROGRESS NOTES
HPI:   Nicol Quiñonez is a 52year old female who presents for upper respiratory symptoms for  1  weeks. Patient reports congestion, dry cough, cough with yellow colored sputum, cough is keeping pt up at night.     Patient presents with:  Cough: X 1 week  Ru 85   Temp 98.3 °F (36.8 °C) (Oral)   Ht 5' 5\" (1.651 m)   Wt 246 lb (111.6 kg)   BMI 40.94 kg/m²   GENERAL: well developed, well nourished,in no apparent distress  SKIN: no rashes,no suspicious lesions  EYES:PERRLA, EOMI, conjunctiva are clear  HEENT: atr

## 2019-01-29 ENCOUNTER — OFFICE VISIT (OUTPATIENT)
Dept: FAMILY MEDICINE CLINIC | Facility: CLINIC | Age: 54
End: 2019-01-29
Payer: COMMERCIAL

## 2019-01-29 ENCOUNTER — NURSE TRIAGE (OUTPATIENT)
Dept: OTHER | Age: 54
End: 2019-01-29

## 2019-01-29 ENCOUNTER — APPOINTMENT (OUTPATIENT)
Dept: LAB | Age: 54
End: 2019-01-29
Attending: FAMILY MEDICINE
Payer: COMMERCIAL

## 2019-01-29 VITALS
TEMPERATURE: 99 F | WEIGHT: 248 LBS | HEIGHT: 65 IN | SYSTOLIC BLOOD PRESSURE: 136 MMHG | BODY MASS INDEX: 41.32 KG/M2 | HEART RATE: 82 BPM | DIASTOLIC BLOOD PRESSURE: 83 MMHG

## 2019-01-29 DIAGNOSIS — R07.89 LEFT-SIDED CHEST WALL PAIN: Primary | ICD-10-CM

## 2019-01-29 DIAGNOSIS — R07.89 LEFT-SIDED CHEST WALL PAIN: ICD-10-CM

## 2019-01-29 DIAGNOSIS — M94.0 COSTOCHONDRITIS: ICD-10-CM

## 2019-01-29 PROCEDURE — 99214 OFFICE O/P EST MOD 30 MIN: CPT | Performed by: FAMILY MEDICINE

## 2019-01-29 PROCEDURE — 93005 ELECTROCARDIOGRAM TRACING: CPT

## 2019-01-29 PROCEDURE — 93010 ELECTROCARDIOGRAM REPORT: CPT | Performed by: FAMILY MEDICINE

## 2019-01-29 PROCEDURE — 99212 OFFICE O/P EST SF 10 MIN: CPT | Performed by: FAMILY MEDICINE

## 2019-01-29 RX ORDER — IBUPROFEN 800 MG/1
800 TABLET ORAL 3 TIMES DAILY
Qty: 60 TABLET | Refills: 1 | Status: SHIPPED | OUTPATIENT
Start: 2019-01-29 | End: 2019-02-13

## 2019-01-29 NOTE — TELEPHONE ENCOUNTER
Spoke to Dr. Remy Huynh. Per Dr. Remy Huynh since it is past 1:30 p.m. Patient can head to clinic now and she will work her into schedule. Attempted to reach patient. Patient unavailable. LMTCB. Transfer to triage.

## 2019-01-29 NOTE — PROGRESS NOTES
HPI:    Patient ID: Ashlee Lopez is a 52year old female.     HPI  Patient presents with:  Chest Pain: X 3 days     Patient called today and added to schedule  Patient called with complaint of left   sided chest pain that started on Friday after lifting he regular rhythm and normal heart sounds. Pulmonary/Chest: Effort normal and breath sounds normal. She exhibits tenderness. Left chest wall tenderness. Breast exam normal bilaterally. No masses. No nipple discharge.               ASSESSMENT/PLAN:   Lef

## 2019-01-29 NOTE — TELEPHONE ENCOUNTER
Action Requested: Summary for Provider     []  Critical Lab, Recommendations Needed  [x] Need Additional Advice  []   FYI    []   Need Orders  [] Need Medications Sent to Pharmacy  []  Other     SUMMARY: Patient calling with complaint of left sided chest p

## 2019-01-29 NOTE — TELEPHONE ENCOUNTER
Pt contacted and informed of note below. Pt states she is about 30 minutes away. Will head to clinic now as advised.  JASMINE

## 2019-02-13 RX ORDER — IBUPROFEN 800 MG/1
800 TABLET ORAL 3 TIMES DAILY PRN
Qty: 60 TABLET | Refills: 0 | Status: SHIPPED | OUTPATIENT
Start: 2019-02-13 | End: 2019-03-25

## 2019-02-21 ENCOUNTER — TELEPHONE (OUTPATIENT)
Dept: FAMILY MEDICINE CLINIC | Facility: CLINIC | Age: 54
End: 2019-02-21

## 2019-02-21 RX ORDER — IBUPROFEN 800 MG/1
800 TABLET ORAL 3 TIMES DAILY PRN
Qty: 60 TABLET | Refills: 0 | Status: CANCELLED | OUTPATIENT
Start: 2019-02-21 | End: 2020-02-16

## 2019-03-21 ENCOUNTER — OFFICE VISIT (OUTPATIENT)
Dept: FAMILY MEDICINE CLINIC | Facility: CLINIC | Age: 54
End: 2019-03-21
Payer: COMMERCIAL

## 2019-03-21 ENCOUNTER — NURSE TRIAGE (OUTPATIENT)
Dept: OTHER | Age: 54
End: 2019-03-21

## 2019-03-21 VITALS
SYSTOLIC BLOOD PRESSURE: 142 MMHG | DIASTOLIC BLOOD PRESSURE: 92 MMHG | HEART RATE: 79 BPM | HEIGHT: 65 IN | WEIGHT: 250 LBS | BODY MASS INDEX: 41.65 KG/M2

## 2019-03-21 DIAGNOSIS — N30.90 CYSTITIS: Primary | ICD-10-CM

## 2019-03-21 DIAGNOSIS — R30.0 DYSURIA: ICD-10-CM

## 2019-03-21 LAB
PH, URINE: 6 (ref 4.5–8)
SPECIFIC GRAVITY: 1.01 (ref 1–1.03)
URINE-COLOR: YELLOW
UROBILINOGEN,SEMI-QN: 0.2 MG/DL (ref 0–1.9)

## 2019-03-21 PROCEDURE — 81000 URINALYSIS NONAUTO W/SCOPE: CPT | Performed by: NURSE PRACTITIONER

## 2019-03-21 PROCEDURE — 99213 OFFICE O/P EST LOW 20 MIN: CPT | Performed by: NURSE PRACTITIONER

## 2019-03-21 RX ORDER — LEVOFLOXACIN 500 MG/1
500 TABLET, FILM COATED ORAL DAILY
Qty: 3 TABLET | Refills: 0 | Status: SHIPPED | OUTPATIENT
Start: 2019-03-21 | End: 2019-03-24

## 2019-03-21 NOTE — TELEPHONE ENCOUNTER
Action Requested: Summary for Provider     []  Critical Lab, Recommendations Needed  [] Need Additional Advice  []   FYI    []   Need Orders  [] Need Medications Sent to Pharmacy  []  Other     SUMMARY: appointment made for today 3/21/19    The patient sta

## 2019-03-21 NOTE — PROGRESS NOTES
HPI    Pt here for low back pain and pelvic pain for the past week. Denies pain with urination; has been going more at night. Review of Systems   Genitourinary: Positive for pelvic pain. Negative for dysuria.         Nocturia   Musculoskeletal: Positive Tobacco Use      Smoking status: Former Smoker        Packs/day: 1.00        Years: 13.00        Pack years: 13      Smokeless tobacco: Former User    Substance and Sexual Activity      Alcohol use:  Yes        Alcohol/week: 1.0 oz        Types: 2 Shots of CULTURE, ROUTINE (Completed)    URINALYSIS, NONAUTO, W/SCOPE (Completed)    Cystitis - Primary     levaquin 500 mg I po q day x 3  Supportive care discussed to help alleviate symptoms    Please call if symptoms worsen or are not resolving.

## 2019-03-24 NOTE — ASSESSMENT & PLAN NOTE
levaquin 500 mg I po q day x 3  Supportive care discussed to help alleviate symptoms    Please call if symptoms worsen or are not resolving.

## 2019-03-25 ENCOUNTER — TELEPHONE (OUTPATIENT)
Dept: FAMILY MEDICINE CLINIC | Facility: CLINIC | Age: 54
End: 2019-03-25

## 2019-03-25 RX ORDER — IBUPROFEN 800 MG/1
800 TABLET ORAL 3 TIMES DAILY PRN
Qty: 60 TABLET | Refills: 0 | Status: SHIPPED | OUTPATIENT
Start: 2019-03-25 | End: 2020-03-19

## 2019-03-25 NOTE — TELEPHONE ENCOUNTER
Action Requested: Summary for Provider     []  Critical Lab, Recommendations Needed  [] Need Additional Advice  []   FYI    []   Need Orders  [x] Need Medications Sent to Pharmacy  []  Other     SUMMARY: patient calling to report she has completed abx for

## 2019-03-25 NOTE — TELEPHONE ENCOUNTER
Spoke with patient and informed her of David Storey message and order. Patient voiced understanding and scheduled an appointment for 4/4/19 with Dr. Justo Swartz.

## 2019-04-02 ENCOUNTER — OFFICE VISIT (OUTPATIENT)
Dept: FAMILY MEDICINE CLINIC | Facility: CLINIC | Age: 54
End: 2019-04-02
Payer: COMMERCIAL

## 2019-04-02 VITALS
DIASTOLIC BLOOD PRESSURE: 85 MMHG | HEIGHT: 65 IN | TEMPERATURE: 98 F | SYSTOLIC BLOOD PRESSURE: 132 MMHG | WEIGHT: 251 LBS | HEART RATE: 81 BPM | BODY MASS INDEX: 41.82 KG/M2

## 2019-04-02 DIAGNOSIS — R10.2 PELVIC PRESSURE IN FEMALE: ICD-10-CM

## 2019-04-02 DIAGNOSIS — M54.9 MID BACK PAIN: Primary | ICD-10-CM

## 2019-04-02 DIAGNOSIS — I10 ESSENTIAL HYPERTENSION: ICD-10-CM

## 2019-04-02 PROCEDURE — 81002 URINALYSIS NONAUTO W/O SCOPE: CPT | Performed by: FAMILY MEDICINE

## 2019-04-02 PROCEDURE — 99213 OFFICE O/P EST LOW 20 MIN: CPT | Performed by: FAMILY MEDICINE

## 2019-04-02 PROCEDURE — 99214 OFFICE O/P EST MOD 30 MIN: CPT | Performed by: FAMILY MEDICINE

## 2019-04-02 NOTE — PROGRESS NOTES
HPI:    Patient ID: Wiliam Rinaldi is a 52year old female. HPI  Patient presents with: Follow - Up: on lower back pain    González Rankin NP for low back pain.     Feels when her bladder is full she feels she gets \"expansion\" in her mid back and has pelvic no CVA tenderness. ASSESSMENT/PLAN:   Mid back pain  (primary encounter diagnosis)  Pelvic pressure in female  Essential hypertension    1.  Mid back pain  Suspect muscular      - URINALYSIS NONAUTO W/O SCOPE    2. Pelvic pressure in female

## 2019-04-09 ENCOUNTER — HOSPITAL ENCOUNTER (OUTPATIENT)
Dept: ULTRASOUND IMAGING | Age: 54
Discharge: HOME | End: 2019-04-09
Attending: FAMILY MEDICINE
Payer: COMMERCIAL

## 2019-04-09 DIAGNOSIS — R10.2 PELVIC PRESSURE IN FEMALE: ICD-10-CM

## 2019-04-09 PROCEDURE — 76830 TRANSVAGINAL US NON-OB: CPT | Performed by: FAMILY MEDICINE

## 2019-04-09 PROCEDURE — 76856 US EXAM PELVIC COMPLETE: CPT | Performed by: FAMILY MEDICINE

## 2019-04-29 RX ORDER — IBUPROFEN 800 MG/1
TABLET ORAL
Qty: 270 TABLET | Refills: 1 | Status: SHIPPED | OUTPATIENT
Start: 2019-04-29 | End: 2019-06-10

## 2019-05-06 NOTE — ED NOTES
Spoke with Sebastian Marie 93 ED charge RN of [address] them aware of patients condition and need for further evaluation in the ER, and that patient will be arriving via private vehicle accompanied by family.  Patient and family verbalize understanding of ambulatory

## 2019-06-10 ENCOUNTER — OFFICE VISIT (OUTPATIENT)
Dept: FAMILY MEDICINE CLINIC | Facility: CLINIC | Age: 54
End: 2019-06-10
Payer: COMMERCIAL

## 2019-06-10 VITALS
SYSTOLIC BLOOD PRESSURE: 137 MMHG | TEMPERATURE: 98 F | WEIGHT: 253 LBS | HEIGHT: 65 IN | BODY MASS INDEX: 42.15 KG/M2 | HEART RATE: 80 BPM | DIASTOLIC BLOOD PRESSURE: 84 MMHG

## 2019-06-10 DIAGNOSIS — E66.01 MORBID OBESITY WITH BMI OF 40.0-44.9, ADULT (HCC): ICD-10-CM

## 2019-06-10 DIAGNOSIS — Z12.4 PAP SMEAR FOR CERVICAL CANCER SCREENING: Primary | ICD-10-CM

## 2019-06-10 PROCEDURE — 99214 OFFICE O/P EST MOD 30 MIN: CPT | Performed by: FAMILY MEDICINE

## 2019-06-10 PROCEDURE — 99212 OFFICE O/P EST SF 10 MIN: CPT | Performed by: FAMILY MEDICINE

## 2019-06-10 RX ORDER — AMLODIPINE BESYLATE 10 MG/1
10 TABLET ORAL
Qty: 90 TABLET | Refills: 3 | Status: SHIPPED | OUTPATIENT
Start: 2019-06-10 | End: 2020-07-05

## 2019-06-10 RX ORDER — LOSARTAN POTASSIUM AND HYDROCHLOROTHIAZIDE 12.5; 5 MG/1; MG/1
1 TABLET ORAL DAILY
Qty: 90 TABLET | Refills: 3 | Status: SHIPPED | OUTPATIENT
Start: 2019-06-10 | End: 2020-06-22 | Stop reason: DRUGHIGH

## 2019-06-10 NOTE — PROGRESS NOTES
HPI:    Patient ID: Brandon Olvera is a 52year old female.     HPI  Patient presents with:  Pap    Last pap 2/2016- normal    mammo due, order given 8/2018    Review of Systems   Genitourinary: Negative for dysuria, genital sores, menstrual problem, pelvic Not on file        Attends Hindu service: Not on file        Active member of club or organization: Not on file        Attends meetings of clubs or organizations: Not on file        Relationship status: Not on file      Intimate partner violence: regular rhythm and normal heart sounds. Abdominal: Soft. Bowel sounds are normal.   Genitourinary: Vagina normal and uterus normal. No breast swelling, tenderness, discharge or bleeding. There is no rash, tenderness, lesion or injury on the right labia.

## 2019-06-12 ENCOUNTER — HOSPITAL ENCOUNTER (OUTPATIENT)
Dept: MAMMOGRAPHY | Age: 54
Discharge: HOME | End: 2019-06-12
Attending: FAMILY MEDICINE
Payer: COMMERCIAL

## 2019-06-12 DIAGNOSIS — Z12.31 ENCOUNTER FOR SCREENING MAMMOGRAM FOR BREAST CANCER: ICD-10-CM

## 2019-06-12 PROCEDURE — 77063 BREAST TOMOSYNTHESIS BI: CPT | Performed by: FAMILY MEDICINE

## 2019-06-12 PROCEDURE — 77067 SCR MAMMO BI INCL CAD: CPT | Performed by: FAMILY MEDICINE

## 2019-09-05 ENCOUNTER — NURSE TRIAGE (OUTPATIENT)
Dept: OTHER | Age: 54
End: 2019-09-05

## 2019-09-05 NOTE — TELEPHONE ENCOUNTER
Agree with advice, Can add patient as an add-on to my schedule for sore throat or she can go to urgent care if no better

## 2019-09-05 NOTE — TELEPHONE ENCOUNTER
Action Requested: Summary for Provider     []  Critical Lab, Recommendations Needed  [x] Need Additional Advice  []   FYI    []   Need Orders  [x] Need Medications Sent to Pharmacy  []  Other     SUMMARY: Patient has had a sore throat for 2 days.  Stuffy no

## 2019-09-09 ENCOUNTER — OFFICE VISIT (OUTPATIENT)
Dept: FAMILY MEDICINE CLINIC | Facility: CLINIC | Age: 54
End: 2019-09-09
Payer: COMMERCIAL

## 2019-09-09 VITALS
SYSTOLIC BLOOD PRESSURE: 139 MMHG | DIASTOLIC BLOOD PRESSURE: 81 MMHG | HEART RATE: 86 BPM | HEIGHT: 65 IN | BODY MASS INDEX: 42.49 KG/M2 | TEMPERATURE: 99 F | WEIGHT: 255 LBS

## 2019-09-09 DIAGNOSIS — R09.82 POSTNASAL DRIP: ICD-10-CM

## 2019-09-09 DIAGNOSIS — Z23 NEED FOR TDAP VACCINATION: ICD-10-CM

## 2019-09-09 DIAGNOSIS — I10 ESSENTIAL HYPERTENSION: ICD-10-CM

## 2019-09-09 DIAGNOSIS — E66.01 MORBID OBESITY WITH BMI OF 40.0-44.9, ADULT (HCC): ICD-10-CM

## 2019-09-09 DIAGNOSIS — Z00.00 WELL ADULT EXAM: Primary | ICD-10-CM

## 2019-09-09 PROBLEM — H93.8X1 MASS OF RIGHT EAR: Status: RESOLVED | Noted: 2018-03-20 | Resolved: 2019-09-09

## 2019-09-09 PROBLEM — N30.90 CYSTITIS: Status: RESOLVED | Noted: 2019-03-21 | Resolved: 2019-09-09

## 2019-09-09 PROBLEM — R30.0 DYSURIA: Status: RESOLVED | Noted: 2018-07-05 | Resolved: 2019-09-09

## 2019-09-09 PROCEDURE — 99396 PREV VISIT EST AGE 40-64: CPT | Performed by: FAMILY MEDICINE

## 2019-09-09 PROCEDURE — 90471 IMMUNIZATION ADMIN: CPT | Performed by: FAMILY MEDICINE

## 2019-09-09 PROCEDURE — 90715 TDAP VACCINE 7 YRS/> IM: CPT | Performed by: FAMILY MEDICINE

## 2019-09-09 RX ORDER — FLUTICASONE PROPIONATE 50 MCG
2 SPRAY, SUSPENSION (ML) NASAL NIGHTLY
Qty: 1 BOTTLE | Refills: 2 | Status: SHIPPED | OUTPATIENT
Start: 2019-09-09 | End: 2019-11-25

## 2019-09-09 NOTE — PROGRESS NOTES
HPI:    Patient ID: Renea Thacker is a 52year old female. HPI  Patient presents with: Well Adult        Review of Systems   Constitutional: Negative for activity change, appetite change, chills, fatigue, fever and unexpected weight change.    HENT: Neg Laterality Date   • COLONOSCOPY N/A 2018    Performed by Mir Lopez MD at [facility] ENDOSCOPY   • ENDOMETRIAL ABLATION      child passed away for 5 mins   •       1   • OTHER SURGICAL HISTORY  11    cysto-Dr. Mark Diaz -- pt denies Disorder Mother    • Other (Other) Mother         kidney failure   • Hypertension Father    • Cancer Neg    • Diabetes Neg        Immunization History   Administered Date(s) Administered   • Kenalog Per 10mg Inj 02/20/2017   • Tb Intradermal Test 02/10/201 Abdominal: Soft. Bowel sounds are normal. She exhibits no mass. There is no tenderness. Genitourinary: Vagina normal and uterus normal. There is no rash, tenderness, lesion or injury on the right labia.  There is no rash, tenderness, lesion or injury on limiting sugary beverages. 4. Need for Tdap vaccination  tdap    5.  Postnasal drip  Add flonase      Orders Placed This Encounter      ALT(SGPT) [E]      AST (SGOT) [E]      Basic Metabolic Panel (8) [E]      CBC W Differential W Platelet [E]      L

## 2019-11-04 ENCOUNTER — TELEPHONE (OUTPATIENT)
Dept: FAMILY MEDICINE CLINIC | Facility: CLINIC | Age: 54
End: 2019-11-04

## 2019-11-04 ENCOUNTER — NURSE TRIAGE (OUTPATIENT)
Dept: FAMILY MEDICINE CLINIC | Facility: CLINIC | Age: 54
End: 2019-11-04

## 2019-11-04 DIAGNOSIS — M25.561 RIGHT KNEE PAIN, UNSPECIFIED CHRONICITY: Primary | ICD-10-CM

## 2019-11-04 DIAGNOSIS — M23.91 INTERNAL DERANGEMENT OF KNEE, RIGHT: ICD-10-CM

## 2019-11-04 NOTE — TELEPHONE ENCOUNTER
Ortho referral information and telephone [phone] given to pt who verbalized understanding. Pt thankful.

## 2019-11-04 NOTE — TELEPHONE ENCOUNTER
Action Requested: Summary for Provider     []  Critical Lab, Recommendations Needed  [] Need Additional Advice  []   FYI    []   Need Orders  [] Need Medications Sent to Pharmacy  []  Other     SUMMARY:   Patient calling with pain right knee  Right knee sw

## 2019-11-04 NOTE — TELEPHONE ENCOUNTER
Patient would like to get a referral to see a knee pain specialist. She is requesting the same one Dr. Isa Huynh referred to 3 years ago. Please call patient once referral is in the system. Thank you.

## 2019-11-19 ENCOUNTER — HOSPITAL ENCOUNTER (OUTPATIENT)
Dept: GENERAL RADIOLOGY | Facility: HOSPITAL | Age: 54
Discharge: HOME | End: 2019-11-19
Attending: ORTHOPAEDIC SURGERY
Payer: COMMERCIAL

## 2019-11-19 ENCOUNTER — OFFICE VISIT (OUTPATIENT)
Dept: ORTHOPEDICS CLINIC | Facility: CLINIC | Age: 54
End: 2019-11-19
Payer: COMMERCIAL

## 2019-11-19 VITALS
DIASTOLIC BLOOD PRESSURE: 90 MMHG | RESPIRATION RATE: 18 BRPM | HEIGHT: 65 IN | BODY MASS INDEX: 38.32 KG/M2 | WEIGHT: 230 LBS | SYSTOLIC BLOOD PRESSURE: 150 MMHG | HEART RATE: 81 BPM

## 2019-11-19 DIAGNOSIS — E66.09 OBESITY DUE TO EXCESS CALORIES, UNSPECIFIED CLASSIFICATION, UNSPECIFIED WHETHER SERIOUS COMORBIDITY PRESENT: ICD-10-CM

## 2019-11-19 DIAGNOSIS — M25.561 RIGHT KNEE PAIN, UNSPECIFIED CHRONICITY: Primary | ICD-10-CM

## 2019-11-19 DIAGNOSIS — M17.11 PRIMARY OSTEOARTHRITIS OF RIGHT KNEE: ICD-10-CM

## 2019-11-19 DIAGNOSIS — M25.561 RIGHT KNEE PAIN, UNSPECIFIED CHRONICITY: ICD-10-CM

## 2019-11-19 PROCEDURE — 73564 X-RAY EXAM KNEE 4 OR MORE: CPT | Performed by: ORTHOPAEDIC SURGERY

## 2019-11-19 PROCEDURE — 99203 OFFICE O/P NEW LOW 30 MIN: CPT | Performed by: ORTHOPAEDIC SURGERY

## 2019-11-19 PROCEDURE — 20610 DRAIN/INJ JOINT/BURSA W/O US: CPT | Performed by: ORTHOPAEDIC SURGERY

## 2019-11-19 RX ORDER — TRIAMCINOLONE ACETONIDE 40 MG/ML
40 INJECTION, SUSPENSION INTRA-ARTICULAR; INTRAMUSCULAR ONCE
Status: COMPLETED | OUTPATIENT
Start: 2019-11-19 | End: 2019-11-19

## 2019-11-19 RX ADMIN — TRIAMCINOLONE ACETONIDE 40 MG: 40 INJECTION, SUSPENSION INTRA-ARTICULAR; INTRAMUSCULAR at 15:20:00

## 2019-11-19 NOTE — PROGRESS NOTES
NURSING INTAKE COMMENTS: Patient presents with:  Knee Pain: Right- pt states pain started 1 month ago. pt denies any injury.        HPI: This 62year old female presents today with complaints of NURSING INTAKE COMMENTS: Patient presents with:  Knee Pain: Ri Years: 13.00        Pack years: 15      Smokeless tobacco: Former User    Substance and Sexual Activity      Alcohol use:  Yes        Alcohol/week: 1.7 standard drinks        Types: 2 Shots of liquor per week        Comment: marco 3 x week      Drug us pain, unspecified chronicity  -     XR KNEE, COMPLETE (4 OR MORE VIEWS), RIGHT (INE=55864);  Future    Primary osteoarthritis of right knee  -     DRAIN/INJECT LARGE JOINT/BURSA  -     triamcinolone acetonide (KENALOG-40) 40 MG/ML injection 40 mg        Ass nose  Family History   Problem Relation Age of Onset   • Hypertension Mother    • Heart Disorder Mother    • Other (Other) Mother         kidney failure   • Hypertension Father    • Cancer Neg    • Diabetes Neg       No history of DVT/PE    Social History Plan:  Diagnoses and all orders for this visit:    Right knee pain, unspecified chronicity  -     XR KNEE, COMPLETE (4 OR MORE VIEWS), RIGHT (KIC=72638);  Future    Primary osteoarthritis of right knee  -     DRAIN/INJECT LARGE JOINT/BURSA  -     triamcinol

## 2019-11-19 NOTE — PROGRESS NOTES
Per verbal order from Dr. Jaiden Rojo, draw up and 4ml of 0.5% Marcaine and 1ml of Kenalog 40 for injection into right knee. Vannessa Hughes RN

## 2019-11-25 RX ORDER — FLUTICASONE PROPIONATE 50 MCG
SPRAY, SUSPENSION (ML) NASAL
Qty: 3 BOTTLE | Refills: 1 | Status: SHIPPED | OUTPATIENT
Start: 2019-11-25 | End: 2020-08-09

## 2019-11-25 NOTE — TELEPHONE ENCOUNTER
Patient called. Leaving town Wednesday and needs rx. Refill passed per [facility] protocol.     Refill Protocol Appointment Criteria  · Appointment scheduled in the past 6 months or in the next 3 months  Recent Outpatient Visits            6 days a

## 2019-12-07 ENCOUNTER — TELEPHONE (OUTPATIENT)
Dept: FAMILY MEDICINE CLINIC | Facility: CLINIC | Age: 54
End: 2019-12-07

## 2019-12-07 NOTE — TELEPHONE ENCOUNTER
Pt called back stating if you could put the order for the stress test.  I did informed her if her if chest pain gets worst she [facility]

## 2019-12-07 NOTE — TELEPHONE ENCOUNTER
Pt called to follow up on stress test order request. Reviewed doctor's recommendations as noted below. Pt states she is going to [address] [place] [address] now as she is experiencing chest pain and has a headache.

## 2019-12-07 NOTE — TELEPHONE ENCOUNTER
I need her notes from [facility] and she needs to follow up

## 2019-12-07 NOTE — TELEPHONE ENCOUNTER
Patient stated she was at [facility] last night for minor chest pain, they did an EKG and everything was fine. They wanted patient to stay over night to do a stress test, patient declined and said she will do it with her doctor.  Patient would like an order put in for a stress test.

## 2019-12-08 ENCOUNTER — APPOINTMENT (OUTPATIENT)
Dept: GENERAL RADIOLOGY | Facility: HOSPITAL | Age: 54
End: 2019-12-08
Payer: COMMERCIAL

## 2019-12-08 ENCOUNTER — HOSPITAL ENCOUNTER (EMERGENCY)
Facility: HOSPITAL | Age: 54
Discharge: HOME | End: 2019-12-08
Payer: COMMERCIAL

## 2019-12-08 VITALS
TEMPERATURE: 98 F | DIASTOLIC BLOOD PRESSURE: 91 MMHG | BODY MASS INDEX: 38.31 KG/M2 | OXYGEN SATURATION: 98 % | HEART RATE: 75 BPM | WEIGHT: 229.94 LBS | HEIGHT: 65 IN | SYSTOLIC BLOOD PRESSURE: 150 MMHG | RESPIRATION RATE: 18 BRPM

## 2019-12-08 DIAGNOSIS — R07.89 CHEST PAIN, ATYPICAL: Primary | ICD-10-CM

## 2019-12-08 PROCEDURE — 80048 BASIC METABOLIC PNL TOTAL CA: CPT

## 2019-12-08 PROCEDURE — 93005 ELECTROCARDIOGRAM TRACING: CPT

## 2019-12-08 PROCEDURE — 84484 ASSAY OF TROPONIN QUANT: CPT

## 2019-12-08 PROCEDURE — 71046 X-RAY EXAM CHEST 2 VIEWS: CPT

## 2019-12-08 PROCEDURE — 93010 ELECTROCARDIOGRAM REPORT: CPT | Performed by: INTERNAL MEDICINE

## 2019-12-08 PROCEDURE — 36415 COLL VENOUS BLD VENIPUNCTURE: CPT

## 2019-12-08 PROCEDURE — 99284 EMERGENCY DEPT VISIT MOD MDM: CPT

## 2019-12-08 PROCEDURE — 85379 FIBRIN DEGRADATION QUANT: CPT | Performed by: EMERGENCY MEDICINE

## 2019-12-08 PROCEDURE — 85025 COMPLETE CBC W/AUTO DIFF WBC: CPT

## 2019-12-08 NOTE — ED PROVIDER NOTES
Patient Seen in: [facility] AND [facility] Emergency Department    History   Patient presents with:  Chest Pain Angina    Stated Complaint: CP    HPI    is here with chest discomfort that she has been feeling for about a month on and off.   She tends to feel it m Review of Systems  Constitutional: no fever  No cough no congestion  Cardiovascular: chest pain  Respiratory: no shortness of breath  Gastrointestinal: no abdominal pain, no nausea, no vomiting      Positive for stated complaint: CP  Other systems are accept the information is going to follow-up by calling tomorrow.     ED Course     Labs Reviewed   BASIC METABOLIC PANEL (8) - Abnormal; Notable for the following components:       Result Value    BUN 28 (*)     Creatinine 1.22 (*)     BUN/CREA Ratio 23.0

## 2019-12-08 NOTE — ED INITIAL ASSESSMENT (HPI)
Intermittent L-sided chest discomfort x 3 weeks. Also reports tingling to L arm. Evaluated at [facility] for same symptoms on  Friday, but left AMA.

## 2019-12-09 NOTE — BH LEVEL OF CARE ASSESSMENT
Level of Care Assessment Note    General Questions  Why are you here?: \"I came in for chest pains. \"  Precipitating Events: Pt reports the pain/discomfort increasing in frequency for about 1 year, now multiple times a week \"any time I think about work or or Near Loss by Suicide: Denies    Danger to Others/Property  Have you harmed someone or had thoughts about wanting someone harmed or killed in the past 30 days?: No  Have you harmed someone or had thoughts about wanting someone harmed or killed further ba (Calculated): 38.3  IBW LBS [place]: 125 LBS  IBW %: 183.95 %  IBW + 10%: 137.5 LBS  IBW - 10%: 112.5 LBS  SCOFF Questionnaire  Do you make yourself Sick because you feel uncomfortably full?: No  Do you worry that you have lost Control over how much you eat? No  Employment Status: Employed(2 jobs)  Job Issues: Missed Days; Peer conflicts; Decreased Productivity; Decreased Concentration  Concerns/Conflicts with Social Relationships: Yes  Describe Concerns/Conflicts with Social Relationships[de-identified] Pt reports being cont mental health tx. Thought Patterns  Clarity/Relevance: Logical;Relevant to topic  Flow: Organized  Content: Ordinary  Level of Consciousness: Alert  Level of Consciousness: Alert  Behavior  Exhibited behavior: Participated; Appropriate to situation    [place] disability)  Education Provided: Call 911 in an Emergency;[facility] Crisis Line Number;Advised to call if condition worsens; Advised to call with questions  Transferred: No    Primary Psychiatric Diagnosis  Anxiety Disorders: Panic Disorder  Substance Related and

## 2019-12-10 ENCOUNTER — OFFICE VISIT (OUTPATIENT)
Dept: FAMILY MEDICINE CLINIC | Facility: CLINIC | Age: 54
End: 2019-12-10
Payer: COMMERCIAL

## 2019-12-10 ENCOUNTER — APPOINTMENT (OUTPATIENT)
Dept: LAB | Age: 54
End: 2019-12-10
Attending: FAMILY MEDICINE
Payer: COMMERCIAL

## 2019-12-10 VITALS
SYSTOLIC BLOOD PRESSURE: 145 MMHG | HEIGHT: 65 IN | WEIGHT: 253 LBS | DIASTOLIC BLOOD PRESSURE: 84 MMHG | TEMPERATURE: 98 F | BODY MASS INDEX: 42.15 KG/M2 | HEART RATE: 76 BPM

## 2019-12-10 DIAGNOSIS — F43.23 ADJUSTMENT REACTION WITH ANXIETY AND DEPRESSION: ICD-10-CM

## 2019-12-10 DIAGNOSIS — F10.20 ALCOHOLISM (HCC): ICD-10-CM

## 2019-12-10 DIAGNOSIS — I10 ESSENTIAL HYPERTENSION: ICD-10-CM

## 2019-12-10 DIAGNOSIS — R07.9 CHEST PAIN, UNSPECIFIED TYPE: Primary | ICD-10-CM

## 2019-12-10 DIAGNOSIS — N28.9 DECREASED RENAL FUNCTION: ICD-10-CM

## 2019-12-10 DIAGNOSIS — E78.00 HYPERCHOLESTEREMIA: ICD-10-CM

## 2019-12-10 PROCEDURE — 85025 COMPLETE CBC W/AUTO DIFF WBC: CPT | Performed by: FAMILY MEDICINE

## 2019-12-10 PROCEDURE — 36415 COLL VENOUS BLD VENIPUNCTURE: CPT | Performed by: FAMILY MEDICINE

## 2019-12-10 PROCEDURE — 80061 LIPID PANEL: CPT | Performed by: FAMILY MEDICINE

## 2019-12-10 PROCEDURE — 84443 ASSAY THYROID STIM HORMONE: CPT | Performed by: FAMILY MEDICINE

## 2019-12-10 PROCEDURE — 80053 COMPREHEN METABOLIC PANEL: CPT | Performed by: FAMILY MEDICINE

## 2019-12-10 PROCEDURE — 99214 OFFICE O/P EST MOD 30 MIN: CPT | Performed by: FAMILY MEDICINE

## 2019-12-10 PROCEDURE — 82306 VITAMIN D 25 HYDROXY: CPT | Performed by: FAMILY MEDICINE

## 2019-12-10 PROCEDURE — 99212 OFFICE O/P EST SF 10 MIN: CPT | Performed by: FAMILY MEDICINE
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
## 2019-12-13 ENCOUNTER — NURSE TRIAGE (OUTPATIENT)
Dept: FAMILY MEDICINE CLINIC | Facility: CLINIC | Age: 54
End: 2019-12-13

## 2019-12-13 NOTE — TELEPHONE ENCOUNTER
Patient called in stating that she had a return to work note from 12/10 and it cleared her to go back to work today, 12/13. She states that she is still not feeling well and would like a new note clearing her for Monday, 12/16. Please advise and call back when available for .

## 2019-12-13 NOTE — TELEPHONE ENCOUNTER
Patient calling to follow up for updated work note. Requesting if note can be faxed to her at [phone] Jp Ambrosio. Patient requesting phone call to confirm when fax has been sent.

## 2019-12-16 NOTE — TELEPHONE ENCOUNTER
Spoke with pt and Dr Melva Nielsen recommendations given. Pt verb understanding. Pt asking if she can be added to Dr Melva Nielsen schedule today to discuss anxiety medications as pt is supposed to return to work tomorrow     Pt also asking if Dr Niels Agosto has provided a new note. Note pended for MD review.

## 2019-12-16 NOTE — TELEPHONE ENCOUNTER
Dr Romario Thomson: patient called back. See Maikel Hutchinson RN note first.     (patient was able to get sooner stress test appt for 12/19/19)  Patient asking if you feel she should be off of work until stress test is done).

## 2019-12-17 DIAGNOSIS — N28.9 DECREASED RENAL FUNCTION: Primary | ICD-10-CM

## 2019-12-17 DIAGNOSIS — E55.9 VITAMIN D DEFICIENCY: ICD-10-CM

## 2019-12-17 DIAGNOSIS — R73.9 HYPERGLYCEMIA: ICD-10-CM

## 2019-12-17 RX ORDER — ERGOCALCIFEROL 1.25 MG/1
50000 CAPSULE ORAL WEEKLY
Qty: 12 CAPSULE | Refills: 3 | Status: SHIPPED | OUTPATIENT
Start: 2019-12-17 | End: 2020-01-07

## 2019-12-18 ENCOUNTER — TELEPHONE (OUTPATIENT)
Dept: FAMILY MEDICINE CLINIC | Facility: CLINIC | Age: 54
End: 2019-12-18

## 2019-12-18 ENCOUNTER — TELEPHONE (OUTPATIENT)
Dept: OTHER | Age: 54
End: 2019-12-18

## 2019-12-18 NOTE — TELEPHONE ENCOUNTER
Patient calling about her stress test for tomorrow   Her insurance submitted a form on December 12th   for more information , has not been approved     Spoke with Neha  office, they will check  in to this and call the  patient back today     Patient informed

## 2019-12-18 NOTE — TELEPHONE ENCOUNTER
Pt states her job sent over short term disability forms and would like to know did the office received them. Phone number to the forms department given.  Routed BRENNA

## 2019-12-18 NOTE — TELEPHONE ENCOUNTER
Pt will have Disab forms faxed to Forms Dept. Aware to sign HIPAA and pay fee. 1st day off: 12/9/19 - Chest pain - will be following up with Dr. Grace Sykes for a rtw date.

## 2019-12-19 ENCOUNTER — TELEPHONE (OUTPATIENT)
Dept: FAMILY MEDICINE CLINIC | Facility: CLINIC | Age: 54
End: 2019-12-19

## 2019-12-19 RX ORDER — POTASSIUM CHLORIDE 750 MG/1
10 TABLET, FILM COATED, EXTENDED RELEASE ORAL DAILY
Qty: 90 TABLET | Refills: 0 | Status: SHIPPED | OUTPATIENT
Start: 2019-12-19 | End: 2020-07-08

## 2019-12-19 NOTE — TELEPHONE ENCOUNTER
Patient requesting note to go back to work Monday 12/23. Requesting call back from nurse, has questions.

## 2019-12-19 NOTE — TELEPHONE ENCOUNTER
Patient states she spoke to insurance and referral department. States more information is needed from  for approval. They need to know why a stress test is being assigned.

## 2019-12-19 NOTE — TELEPHONE ENCOUNTER
Called Bharath Maria from cardiology and advise her if any of the cardiologist is able to see her soon since she is having a hard time getting her echo stress approved.  Called pt and informed her she could come in tomorrow and see the cardiologist assistant Minerva

## 2019-12-19 NOTE — TELEPHONE ENCOUNTER
Called [facility] care and spoke with Eva, she stated referral is still pending she will double check tomorrow

## 2019-12-20 ENCOUNTER — OFFICE VISIT (OUTPATIENT)
Dept: CARDIOLOGY CLINIC | Facility: CLINIC | Age: 54
End: 2019-12-20
Payer: COMMERCIAL

## 2019-12-20 VITALS
SYSTOLIC BLOOD PRESSURE: 149 MMHG | HEART RATE: 80 BPM | DIASTOLIC BLOOD PRESSURE: 96 MMHG | BODY MASS INDEX: 43.45 KG/M2 | RESPIRATION RATE: 18 BRPM | HEIGHT: 65 IN | WEIGHT: 260.81 LBS

## 2019-12-20 DIAGNOSIS — R06.00 DYSPNEA, UNSPECIFIED TYPE: Primary | ICD-10-CM

## 2019-12-20 PROCEDURE — 99214 OFFICE O/P EST MOD 30 MIN: CPT | Performed by: NURSE PRACTITIONER

## 2019-12-20 PROCEDURE — 99212 OFFICE O/P EST SF 10 MIN: CPT | Performed by: NURSE PRACTITIONER

## 2019-12-20 NOTE — PATIENT INSTRUCTIONS
1. Echocardiogram  2. Stress test  3. Check BP at home, low salt diet  4.  Follow up with Valentina in 2-3 weeks

## 2019-12-20 NOTE — PROGRESS NOTES
Ghazala Vargas is a 62year old female. Patient presents with:  Consult  Chest Pain: thightness, sharp pain , discomforting  Dizziness: lightheaded / standing up  Shortness Of Breath: at rest    HPI:   Patient comes in today for consultation.  She sees Dr. Jeane Lynch • ibuprofen 800 MG Oral Tab Take 1 tablet (800 mg total) by mouth 3 (three) times daily as needed for Pain (with food).  (Patient not taking: Reported on 12/20/2019 ) 60 tablet 0      Past Medical History:   Diagnosis Date   • Esophageal reflux    • High addition to the stress test she should also have an echocardiogram with her longstanding hypertension as well as her alcohol intake. Her blood pressure still high she eats a fair amount of salt asked her to cut this down.   I like to see her back in a few

## 2019-12-21 NOTE — TELEPHONE ENCOUNTER
Called pt back and she stated she did see cardiology yesterday and was told to follow up in two weeks and was given an order for a stress echo 2D doppler

## 2019-12-21 NOTE — TELEPHONE ENCOUNTER
Lexi Goel  Thank you for seeing her  Can she return to work from your end?   Can you please let her know or my staff as I am out of office starting tomorrow

## 2019-12-23 ENCOUNTER — TELEPHONE (OUTPATIENT)
Dept: CARDIOLOGY CLINIC | Facility: CLINIC | Age: 54
End: 2019-12-23

## 2019-12-23 NOTE — TELEPHONE ENCOUNTER
FMLA form recvd via fax from pt. Returned pt's call around 5:50 pm today and stated that [address] have been faxed to  for the 2nd time. Let her know that her PCP is out of the office for next 2 weeks and will try sending FMLA to [employer].  Pt still waiting

## 2019-12-23 NOTE — TELEPHONE ENCOUNTER
Dr. Antonio Mcdonald - Continuous leave starting from 12/9/19 - 1-5 weeks for chest pain. Please sign off on form:  -Highlight the patient and hit \"Chart\" button.   -In Chart Review, w/in the Encounter tab - click 1 time on the Telephone call encounter fo

## 2019-12-23 NOTE — TELEPHONE ENCOUNTER
Spoke to pt and asked to have correct APS form faxed to Forms dept. In the meantime, faxed PHI for STD. Pt aware.

## 2019-12-24 NOTE — TELEPHONE ENCOUNTER
Faxed FMLA to [employer] [facility] - [phone]. Mailed copy to pt with copies of PHI for disab. Notified pt.

## 2019-12-24 NOTE — TELEPHONE ENCOUNTER
Dr. Storm Byrd is out of the office for next 2 weeks. Pt has been off work since 12/9/19 due to Chest pain til your follow -up on 1/7/20 unless you release her earlier. Would you be able to sign off on the FMLA form?     Please sign off on form:  -Hig

## 2019-12-30 ENCOUNTER — HOSPITAL ENCOUNTER (OUTPATIENT)
Dept: CV DIAGNOSTICS | Age: 54
Discharge: HOME | End: 2019-12-30
Attending: NURSE PRACTITIONER
Payer: COMMERCIAL

## 2019-12-30 DIAGNOSIS — R06.00 DYSPNEA, UNSPECIFIED TYPE: ICD-10-CM

## 2019-12-30 PROCEDURE — 93306 TTE W/DOPPLER COMPLETE: CPT | Performed by: NURSE PRACTITIONER

## 2019-12-31 NOTE — TELEPHONE ENCOUNTER
Prefer to have stress echo completed before ok to work, but if she needs to return she can, echo appeared stable

## 2020-01-02 ENCOUNTER — TELEPHONE (OUTPATIENT)
Dept: CASE MANAGEMENT | Age: 55
End: 2020-01-02

## 2020-01-02 NOTE — TELEPHONE ENCOUNTER
S/w Brittany Mitchell, still experiencing symptoms. Sanaz Kitchen, Please see below regarding insurance peer to peer for Stress Echo. Can not clear from cardiac standpoint without stress echo being done. Thank you.     Dr. Manuela Esparza,    The cardiac echo test you ordered for Re

## 2020-01-02 NOTE — TELEPHONE ENCOUNTER
Dr. Dixon Villalobos,    The cardiac echo test you ordered for Itz Mauricio has been denied by her insurance company for the following reasons: An EKG was not uninterruptable and did not show signs of ischemia.          There was no documentation stating that patient

## 2020-01-02 NOTE — TELEPHONE ENCOUNTER
Patient did not have the stress echo ordered by me that was denied but she has NOT done the test yet  She did the echo ordered by Blayne Kiran   I will forward this to her to see if she needs further testing to clear her

## 2020-01-06 NOTE — TELEPHONE ENCOUNTER
Returned pt's pc and let her know that no new forms have been sent to the Forms dept. Last activity was on 12/24/19. Pt will be contacting [facility] and will have them fax addtl forms to Forms dept if needed.

## 2020-01-07 ENCOUNTER — OFFICE VISIT (OUTPATIENT)
Dept: CARDIOLOGY CLINIC | Facility: CLINIC | Age: 55
End: 2020-01-07
Payer: COMMERCIAL

## 2020-01-07 VITALS
HEART RATE: 83 BPM | DIASTOLIC BLOOD PRESSURE: 82 MMHG | WEIGHT: 261 LBS | SYSTOLIC BLOOD PRESSURE: 143 MMHG | RESPIRATION RATE: 18 BRPM | HEIGHT: 65 IN | BODY MASS INDEX: 43.49 KG/M2

## 2020-01-07 DIAGNOSIS — R07.9 CHEST PAIN, UNSPECIFIED TYPE: Primary | ICD-10-CM

## 2020-01-07 PROCEDURE — 99214 OFFICE O/P EST MOD 30 MIN: CPT | Performed by: NURSE PRACTITIONER

## 2020-01-07 RX ORDER — ASPIRIN 325 MG
325 TABLET ORAL DAILY
COMMUNITY

## 2020-01-07 NOTE — TELEPHONE ENCOUNTER
Order Request Summary  Order ID: [id number]  Request Status:   Completed Health Plan:  [employer]    Valid Dates:  01/07/2020 - 02/05/2020  Scheduled Date of Service:  01/07/2020     Member Information:  Peewee Hernandez  Member #: [id number]  [phone]

## 2020-01-07 NOTE — PATIENT INSTRUCTIONS
1. Stress test to evaluate chest pain  2. Cut down on salt more  3.  Echo was stable heart structure is normal

## 2020-01-07 NOTE — PROGRESS NOTES
Diya Martin is a 62year old female. Patient presents with: Follow - Up  Chest Pain: left sided cp daily  Dizziness: lightheaded w/ exertion  Dyspnea: on exertion    HPI:   Patient comes in today for follow-up. She sees Dr. Maureen Aparicio.   She has been getting ch 12 capsule 3      Past Medical History:   Diagnosis Date   • Esophageal reflux    • High blood pressure    • HYPERTENSION    • Hypertension    • Unspecified essential hypertension       Social History:  Social History    Tobacco Use      Smoking status:  Fo The patient indicates understanding of these issues and agrees to the plan.   The patient is asked to return in after her stress test.      Brittany Langley, KATHERINE  1/7/2020  12:25 PM

## 2020-01-07 NOTE — TELEPHONE ENCOUNTER
1st available at [facility] in next week. [facility] has tomorrow available. Celestine Ruiz would like to do test there. Schedule stress test for tomorrow. Instructions given to ovidio. No further questions.

## 2020-01-08 ENCOUNTER — HOSPITAL ENCOUNTER (OUTPATIENT)
Dept: CV DIAGNOSTICS | Facility: HOSPITAL | Age: 55
Discharge: HOME | End: 2020-01-08
Attending: NURSE PRACTITIONER
Payer: COMMERCIAL

## 2020-01-08 DIAGNOSIS — R07.9 CHEST PAIN, UNSPECIFIED TYPE: ICD-10-CM

## 2020-01-08 PROCEDURE — 93017 CV STRESS TEST TRACING ONLY: CPT | Performed by: NURSE PRACTITIONER

## 2020-01-08 PROCEDURE — 93350 STRESS TTE ONLY: CPT | Performed by: NURSE PRACTITIONER

## 2020-01-08 PROCEDURE — 93018 CV STRESS TEST I&R ONLY: CPT | Performed by: NURSE PRACTITIONER

## 2020-01-09 ENCOUNTER — TELEPHONE (OUTPATIENT)
Dept: CARDIOLOGY CLINIC | Facility: CLINIC | Age: 55
End: 2020-01-09

## 2020-01-09 NOTE — TELEPHONE ENCOUNTER
Relayed - Notes recorded by KATHERINE Bledsoe on 1/9/2020 at 11:38 AM CST  Stress test was normal, continue present management.

## 2020-01-13 NOTE — TELEPHONE ENCOUNTER
Pt would like this note faxed to [facility] at fax # [phone]/TMWX: Vesta Littlejohn.

## 2020-01-14 NOTE — TELEPHONE ENCOUNTER
Returned patients call regarding more info needed for insurance. Faxed [facility]REW [employer] 1/7 OV, stress echo, and Nannette [employer] [phone].  SC

## 2020-01-17 ENCOUNTER — TELEPHONE (OUTPATIENT)
Dept: FAMILY MEDICINE CLINIC | Facility: CLINIC | Age: 55
End: 2020-01-17

## 2020-01-17 NOTE — TELEPHONE ENCOUNTER
----- Message from Vianca Alicea MD sent at 1/10/2020  9:48 AM CST -----  Noted  May return to work full time with no restrictions  Please inform patient

## 2020-01-29 ENCOUNTER — OFFICE VISIT (OUTPATIENT)
Dept: FAMILY MEDICINE CLINIC | Facility: CLINIC | Age: 55
End: 2020-01-29
Payer: COMMERCIAL

## 2020-01-29 VITALS
WEIGHT: 263 LBS | TEMPERATURE: 98 F | DIASTOLIC BLOOD PRESSURE: 83 MMHG | HEIGHT: 65 IN | BODY MASS INDEX: 43.82 KG/M2 | SYSTOLIC BLOOD PRESSURE: 143 MMHG | HEART RATE: 86 BPM

## 2020-01-29 DIAGNOSIS — T74.31XD ADULT VICTIM OF PSYCHOLOGICAL BULLYING, SUBSEQUENT ENCOUNTER: ICD-10-CM

## 2020-01-29 DIAGNOSIS — R07.89 OTHER CHEST PAIN: Primary | ICD-10-CM

## 2020-01-29 DIAGNOSIS — F43.23 ADJUSTMENT DISORDER WITH MIXED ANXIETY AND DEPRESSED MOOD: ICD-10-CM

## 2020-01-29 DIAGNOSIS — I10 ESSENTIAL HYPERTENSION: ICD-10-CM

## 2020-01-29 DIAGNOSIS — F10.20 ALCOHOLISM (HCC): ICD-10-CM

## 2020-01-29 PROCEDURE — 99214 OFFICE O/P EST MOD 30 MIN: CPT | Performed by: FAMILY MEDICINE

## 2020-01-29 NOTE — TELEPHONE ENCOUNTER
Patient stopped by Louis Nielson . . Patient states Disability was denied. She'll be disputing this decision. Disability company is requesting a dispute letter from the patient.  is scanning denial letter to the forms dept.  Patient will submi

## 2020-01-29 NOTE — PROGRESS NOTES
HPI:    Patient ID: Travon Link is a 62year old female. HPI  Patient presents with:  Chest Pain: f/u chest pain, pain is still there    Back to work,  Saw counselor once but didn't follow up with her.     Saw cardiology, stress test normal. 1/8/2020 Cardiovascular: Normal rate, regular rhythm and normal heart sounds. Pulmonary/Chest: Effort normal and breath sounds normal.   Some chest wall tenderness left chest wall    Abdominal: Soft. Bowel sounds are normal. There is no tenderness.    Psychiatri

## 2020-01-31 NOTE — TELEPHONE ENCOUNTER
Spoke to patient regarding [employer] claim denial.She will be faxing in her appeal letter and we discussed all details. The decrepetncy seems to stem from patient telling her company the claim was for anxiety first, then switching claim to cardio.  I am waiting

## 2020-02-12 ENCOUNTER — NURSE TRIAGE (OUTPATIENT)
Dept: OTHER | Age: 55
End: 2020-02-12

## 2020-02-12 NOTE — TELEPHONE ENCOUNTER
Action Requested: Summary for Provider     []  Critical Lab, Recommendations Needed  [] Need Additional Advice  []   FYI    []   Need Orders  [] Need Medications Sent to Pharmacy  []  Other     SUMMARY: Pt report cough and runny nose x 5 day.  Denies fever,

## 2020-03-18 ENCOUNTER — NURSE TRIAGE (OUTPATIENT)
Dept: OTHER | Age: 55
End: 2020-03-18

## 2020-03-18 NOTE — TELEPHONE ENCOUNTER
Action Requested: Summary for Provider     []  Critical Lab, Recommendations Needed  [] Need Additional Advice  []   FYI    []   Need Orders  [] Need Medications Sent to Pharmacy  []  Other     SUMMARY: Pt is requesting medication for post nasal drip and c

## 2020-03-18 NOTE — TELEPHONE ENCOUNTER
Called and left a message with patient. Told to call back to discuss symptoms that she discussed earlier with the nurse.

## 2020-03-18 NOTE — TELEPHONE ENCOUNTER
Pt returning [address] [place] [address] call    Best call back for pt [phone]

## 2020-03-18 NOTE — TELEPHONE ENCOUNTER
Called and talked to patient. Has post-nasal drip and cough for the past 1 week. No fevers. Did have an itchy throat. Slight wheezing noted but no shortness of breath noted. No travel history noted. No exposure to someone who tested positive.  No allergies

## 2020-05-15 ENCOUNTER — NURSE TRIAGE (OUTPATIENT)
Dept: FAMILY MEDICINE CLINIC | Facility: CLINIC | Age: 55
End: 2020-05-15

## 2020-05-15 NOTE — TELEPHONE ENCOUNTER
Action Requested: Summary for Provider     []  Critical Lab, Recommendations Needed  [] Need Additional Advice  []   FYI    []   Need Orders  [] Need Medications Sent to Pharmacy  []  Other     SUMMARY:  pt stated that her s/sx started three days

## 2020-05-16 ENCOUNTER — OFFICE VISIT (OUTPATIENT)
Dept: FAMILY MEDICINE CLINIC | Facility: CLINIC | Age: 55
End: 2020-05-16
Payer: COMMERCIAL

## 2020-05-16 VITALS
HEART RATE: 76 BPM | BODY MASS INDEX: 42.82 KG/M2 | WEIGHT: 257 LBS | DIASTOLIC BLOOD PRESSURE: 93 MMHG | SYSTOLIC BLOOD PRESSURE: 151 MMHG | HEIGHT: 65 IN | TEMPERATURE: 98 F

## 2020-05-16 DIAGNOSIS — I10 ESSENTIAL HYPERTENSION: ICD-10-CM

## 2020-05-16 DIAGNOSIS — R30.9 PAIN WITH URINATION: Primary | ICD-10-CM

## 2020-05-16 PROCEDURE — 99214 OFFICE O/P EST MOD 30 MIN: CPT | Performed by: FAMILY MEDICINE

## 2020-05-16 PROCEDURE — 81002 URINALYSIS NONAUTO W/O SCOPE: CPT | Performed by: FAMILY MEDICINE

## 2020-05-16 RX ORDER — LOSARTAN POTASSIUM AND HYDROCHLOROTHIAZIDE 12.5; 1 MG/1; MG/1
1 TABLET ORAL DAILY
Qty: 30 TABLET | Refills: 0 | Status: SHIPPED | OUTPATIENT
Start: 2020-05-16 | End: 2020-06-08

## 2020-05-16 RX ORDER — CEPHALEXIN 500 MG/1
500 CAPSULE ORAL 3 TIMES DAILY
Qty: 15 CAPSULE | Refills: 0 | Status: SHIPPED | OUTPATIENT
Start: 2020-05-16 | End: 2020-05-21

## 2020-05-16 RX ORDER — IBUPROFEN 800 MG/1
800 TABLET ORAL EVERY 8 HOURS
COMMUNITY
Start: 2020-03-21 | End: 2020-06-22

## 2020-05-16 RX ORDER — ERGOCALCIFEROL 1.25 MG/1
50000 CAPSULE ORAL WEEKLY
COMMUNITY
Start: 2020-03-25 | End: 2021-01-17

## 2020-05-26 NOTE — TELEPHONE ENCOUNTER
Patient called back on status of request. Please advise.      Please respond to pool: EM RAVI ADO LPN/DAYANARA Spine appears normal, range of motion is not limited, no muscle or joint tenderness

## 2020-05-30 ENCOUNTER — NURSE TRIAGE (OUTPATIENT)
Dept: FAMILY MEDICINE CLINIC | Facility: CLINIC | Age: 55
End: 2020-05-30

## 2020-05-30 NOTE — TELEPHONE ENCOUNTER
Action Requested: Summary for Provider     []  Critical Lab, Recommendations Needed  [] Need Additional Advice  []   FYI    []   Need Orders  [] Need Medications Sent to Pharmacy  []  Other     SUMMARY: Pt seeking recommendations for elevated blood pressur

## 2020-06-01 NOTE — TELEPHONE ENCOUNTER
Called patient yesterday, left message  Please schedule virtual visit this am and have her check blood pressure today

## 2020-06-02 ENCOUNTER — VIRTUAL PHONE E/M (OUTPATIENT)
Dept: FAMILY MEDICINE CLINIC | Facility: CLINIC | Age: 55
End: 2020-06-02
Payer: COMMERCIAL

## 2020-06-02 DIAGNOSIS — I10 ESSENTIAL HYPERTENSION: Primary | ICD-10-CM

## 2020-06-02 PROCEDURE — 99213 OFFICE O/P EST LOW 20 MIN: CPT | Performed by: FAMILY MEDICINE

## 2020-06-02 NOTE — TELEPHONE ENCOUNTER
Pt calling back and MD message given. Pt agrees to plan and telephone visit scheduled for today at 3 pm with Dr Dari Hardwick. Instructed pt to check b/p prior to visit and pt agrees to plan.

## 2020-06-02 NOTE — PROGRESS NOTES
Diane Wili  or legal guardian ,verbally consents to a Virtual/Telephone Check-In visit on 05/26/20. Patient has been referred to the [facility] website at [email or web address] to review the yearly Consent to Treat document.     Patient understands and acc SPRAYS IN EACH NOSTRIL NIGHTLY, Disp: 3 Bottle, Rfl: 1  •  amLODIPine Besylate 10 MG Oral Tab, Take 1 tablet (10 mg total) by mouth once daily. , Disp: 90 tablet, Rfl: 3  •  Losartan Potassium-HCTZ 50-12.5 MG Oral Tab, Take 1 tablet by mouth daily. , Disp: 9

## 2020-06-07 DIAGNOSIS — I10 ESSENTIAL HYPERTENSION: ICD-10-CM

## 2020-06-08 RX ORDER — LOSARTAN POTASSIUM AND HYDROCHLOROTHIAZIDE 12.5; 1 MG/1; MG/1
TABLET ORAL
Qty: 30 TABLET | Refills: 0 | Status: SHIPPED | OUTPATIENT
Start: 2020-06-08 | End: 2020-07-07

## 2020-06-20 ENCOUNTER — NURSE TRIAGE (OUTPATIENT)
Dept: FAMILY MEDICINE CLINIC | Facility: CLINIC | Age: 55
End: 2020-06-20

## 2020-06-20 RX ORDER — IBUPROFEN 800 MG/1
800 TABLET ORAL EVERY 8 HOURS
Qty: 30 TABLET | Refills: 0 | Status: CANCELLED | OUTPATIENT
Start: 2020-06-20

## 2020-06-20 NOTE — TELEPHONE ENCOUNTER
Spoke with pt,  verified, Pt informed of  MD recommendation, pt stated is Dr Prudy Peabody will send Tylenol with codeine to pharm? pls advise, thanks in advance.

## 2020-06-20 NOTE — TELEPHONE ENCOUNTER
Action Requested: Summary for Provider     []  Critical Lab, Recommendations Needed  [x] Need Additional Advice  []   FYI    []   Need Orders  [x] Need Medications Sent to Pharmacy  []  Other     SUMMARY:   Spoke with pt,  verified, pt c/o chronic miranda

## 2020-06-20 NOTE — TELEPHONE ENCOUNTER
Patient has very high blood pressure and would not advise any anti-inflammatories due to this reason. Recommend Tylenol for pain however if she has any calf pain, leg swelling would advise also evaluation in the ER for rule out blood clot.

## 2020-06-20 NOTE — TELEPHONE ENCOUNTER
Spoke with pt,  verified, Pt informed of  MD recommendation, pt stated understanding. Pt requested Tylenol with codeine, she had this med before. pls advise, thanks in advance.

## 2020-06-20 NOTE — TELEPHONE ENCOUNTER
I would recommend Tylenol with codeine, but regular Tylenol over-the-counter. If pain is that severe she needs to be evaluated in the office.

## 2020-06-22 ENCOUNTER — OFFICE VISIT (OUTPATIENT)
Dept: FAMILY MEDICINE CLINIC | Facility: CLINIC | Age: 55
End: 2020-06-22
Payer: COMMERCIAL

## 2020-06-22 VITALS
DIASTOLIC BLOOD PRESSURE: 67 MMHG | WEIGHT: 254 LBS | BODY MASS INDEX: 42.32 KG/M2 | HEIGHT: 65 IN | TEMPERATURE: 98 F | SYSTOLIC BLOOD PRESSURE: 145 MMHG

## 2020-06-22 DIAGNOSIS — E78.00 HYPERCHOLESTEREMIA: ICD-10-CM

## 2020-06-22 DIAGNOSIS — I83.813 VARICOSE VEINS OF BOTH LOWER EXTREMITIES WITH PAIN: ICD-10-CM

## 2020-06-22 DIAGNOSIS — Z13.1 SCREENING FOR DIABETES MELLITUS: ICD-10-CM

## 2020-06-22 DIAGNOSIS — M79.604 LEG PAIN, BILATERAL: ICD-10-CM

## 2020-06-22 DIAGNOSIS — I10 ESSENTIAL HYPERTENSION: Primary | ICD-10-CM

## 2020-06-22 DIAGNOSIS — M79.605 LEG PAIN, BILATERAL: ICD-10-CM

## 2020-06-22 PROCEDURE — 99214 OFFICE O/P EST MOD 30 MIN: CPT | Performed by: FAMILY MEDICINE

## 2020-06-22 RX ORDER — METOPROLOL SUCCINATE 25 MG/1
25 TABLET, EXTENDED RELEASE ORAL DAILY
Qty: 30 TABLET | Refills: 0 | Status: SHIPPED | OUTPATIENT
Start: 2020-06-22 | End: 2020-07-08

## 2020-06-22 NOTE — TELEPHONE ENCOUNTER
Spoke with patient (name and  verified). Informed of message below. No further questions. Will keep her appt at 4pm today.

## 2020-06-22 NOTE — PROGRESS NOTES
HPI:    Patient ID: Cindi Collins is a 62year old female. HPI  Patient presents with:  Blood Pressure: follow up       Patient overall doing well.   She complains that she was having some leg pain on Saturday and she did a lot of work around the house a tablet (10 mg total) by mouth once daily. 90 tablet 3     Allergies:  Seasonal                Runny nose   PHYSICAL EXAM:   Patient presents with:  Blood Pressure: follow up      Physical Exam   Constitutional: She is oriented to person, place, and time.  Austin Paula Succinate ER 25 MG Oral Tablet 24 Hr 30 tablet 0     Sig: Take 1 tablet (25 mg total) by mouth daily.        Imaging & Referrals:  VASCULAR SURGERY - INTERNAL       JM#[id number]

## 2020-07-05 RX ORDER — AMLODIPINE BESYLATE 10 MG/1
TABLET ORAL
Qty: 90 TABLET | Refills: 3 | Status: SHIPPED | OUTPATIENT
Start: 2020-07-05 | End: 2020-07-08

## 2020-07-05 RX ORDER — LOSARTAN POTASSIUM AND HYDROCHLOROTHIAZIDE 12.5; 5 MG/1; MG/1
TABLET ORAL
Qty: 90 TABLET | Refills: 2 | OUTPATIENT
Start: 2020-07-05

## 2020-07-06 ENCOUNTER — NURSE TRIAGE (OUTPATIENT)
Dept: FAMILY MEDICINE CLINIC | Facility: CLINIC | Age: 55
End: 2020-07-06

## 2020-07-06 DIAGNOSIS — I10 ESSENTIAL HYPERTENSION: ICD-10-CM

## 2020-07-06 NOTE — TELEPHONE ENCOUNTER
Action Requested: Summary for Provider     []  Critical Lab, Recommendations Needed  [] Need Additional Advice  [x]   FYI    []   Need Orders  [] Need Medications Sent to Pharmacy  []  Other     SUMMARY: Patient calling with left-sided earache which starte

## 2020-07-07 RX ORDER — LOSARTAN POTASSIUM AND HYDROCHLOROTHIAZIDE 12.5; 1 MG/1; MG/1
TABLET ORAL
Qty: 90 TABLET | Refills: 0 | Status: SHIPPED | OUTPATIENT
Start: 2020-07-07 | End: 2020-07-08

## 2020-07-08 ENCOUNTER — OFFICE VISIT (OUTPATIENT)
Dept: FAMILY MEDICINE CLINIC | Facility: CLINIC | Age: 55
End: 2020-07-08
Payer: COMMERCIAL

## 2020-07-08 VITALS
SYSTOLIC BLOOD PRESSURE: 134 MMHG | BODY MASS INDEX: 42.49 KG/M2 | HEIGHT: 65 IN | WEIGHT: 255 LBS | OXYGEN SATURATION: 99 % | TEMPERATURE: 98 F | HEART RATE: 77 BPM | DIASTOLIC BLOOD PRESSURE: 86 MMHG

## 2020-07-08 DIAGNOSIS — H60.01: ICD-10-CM

## 2020-07-08 DIAGNOSIS — I10 ESSENTIAL HYPERTENSION: ICD-10-CM

## 2020-07-08 DIAGNOSIS — E87.6 HYPOKALEMIA: Primary | ICD-10-CM

## 2020-07-08 PROCEDURE — 99214 OFFICE O/P EST MOD 30 MIN: CPT | Performed by: FAMILY MEDICINE

## 2020-07-08 RX ORDER — LOSARTAN POTASSIUM AND HYDROCHLOROTHIAZIDE 12.5; 1 MG/1; MG/1
1 TABLET ORAL DAILY
Qty: 90 TABLET | Refills: 3 | Status: SHIPPED | OUTPATIENT
Start: 2020-07-08 | End: 2021-10-31

## 2020-07-08 RX ORDER — AMLODIPINE BESYLATE 10 MG/1
10 TABLET ORAL DAILY
Qty: 90 TABLET | Refills: 3 | Status: SHIPPED | OUTPATIENT
Start: 2020-07-08 | End: 2021-10-25

## 2020-07-08 RX ORDER — POTASSIUM CHLORIDE 750 MG/1
10 TABLET, FILM COATED, EXTENDED RELEASE ORAL DAILY
Qty: 90 TABLET | Refills: 3 | Status: SHIPPED | OUTPATIENT
Start: 2020-07-08 | End: 2020-10-01 | Stop reason: DRUGHIGH

## 2020-07-08 RX ORDER — AMOXICILLIN AND CLAVULANATE POTASSIUM 875; 125 MG/1; MG/1
1 TABLET, FILM COATED ORAL 2 TIMES DAILY
Qty: 20 TABLET | Refills: 0 | Status: SHIPPED | OUTPATIENT
Start: 2020-07-08 | End: 2020-07-18

## 2020-07-08 RX ORDER — METOPROLOL SUCCINATE 25 MG/1
25 TABLET, EXTENDED RELEASE ORAL DAILY
Qty: 90 TABLET | Refills: 3 | Status: SHIPPED | OUTPATIENT
Start: 2020-07-08 | End: 2021-08-05

## 2020-07-08 NOTE — PROGRESS NOTES
HPI:    Patient ID: Violet Gerard is a 62year old female.     HPI  Patient presents with:  Ear Pain: right feels a bump inside for almost a week     Patient in for follow-up on blood pressure as well as complaining of a bump in her right ear which has been • FLUTICASONE PROPIONATE 50 MCG/ACT Nasal Suspension INSTILL 2 SPRAYS IN EACH NOSTRIL NIGHTLY 3 Bottle 1     Allergies:  Seasonal                Runny nose   PHYSICAL EXAM:   Patient presents with:  Ear Pain: right feels a bump inside for almost a week total) by mouth daily. • amLODIPine Besylate 10 MG Oral Tab 90 tablet 3     Sig: Take 1 tablet (10 mg total) by mouth daily. • Amoxicillin-Pot Clavulanate 875-125 MG Oral Tab 20 tablet 0     Sig: Take 1 tablet by mouth 2 (two) times daily for 10 days.

## 2020-07-27 ENCOUNTER — NURSE TRIAGE (OUTPATIENT)
Dept: FAMILY MEDICINE CLINIC | Facility: CLINIC | Age: 55
End: 2020-07-27

## 2020-07-27 ENCOUNTER — OFFICE VISIT (OUTPATIENT)
Dept: FAMILY MEDICINE CLINIC | Facility: CLINIC | Age: 55
End: 2020-07-27
Payer: COMMERCIAL

## 2020-07-27 ENCOUNTER — LAB ENCOUNTER (OUTPATIENT)
Dept: LAB | Age: 55
End: 2020-07-27
Attending: FAMILY MEDICINE
Payer: COMMERCIAL

## 2020-07-27 ENCOUNTER — PATIENT MESSAGE (OUTPATIENT)
Dept: FAMILY MEDICINE CLINIC | Facility: CLINIC | Age: 55
End: 2020-07-27

## 2020-07-27 VITALS
TEMPERATURE: 97 F | BODY MASS INDEX: 41.48 KG/M2 | SYSTOLIC BLOOD PRESSURE: 126 MMHG | WEIGHT: 249 LBS | HEIGHT: 65 IN | DIASTOLIC BLOOD PRESSURE: 79 MMHG

## 2020-07-27 DIAGNOSIS — N75.8 BARTHOLIN'S GLAND INFECTION: Primary | ICD-10-CM

## 2020-07-27 DIAGNOSIS — Z11.4 SCREENING FOR HIV (HUMAN IMMUNODEFICIENCY VIRUS): ICD-10-CM

## 2020-07-27 DIAGNOSIS — N28.9 DECREASED RENAL FUNCTION: ICD-10-CM

## 2020-07-27 LAB
ALBUMIN SERPL-MCNC: 3.8 G/DL (ref 3.4–5)
ANION GAP SERPL CALC-SCNC: 4 MMOL/L (ref 0–18)
BUN BLD-MCNC: 42 MG/DL (ref 7–18)
BUN/CREAT SERPL: 28.4 (ref 10–20)
CALCIUM BLD-MCNC: 9.3 MG/DL (ref 8.5–10.1)
CHLORIDE SERPL-SCNC: 108 MMOL/L (ref 98–112)
CO2 SERPL-SCNC: 29 MMOL/L (ref 21–32)
CREAT BLD-MCNC: 1.48 MG/DL (ref 0.55–1.02)
EST. AVERAGE GLUCOSE BLD GHB EST-MCNC: 140 MG/DL (ref 68–126)
GLUCOSE BLD-MCNC: 104 MG/DL (ref 70–99)
HBA1C MFR BLD HPLC: 6.5 % (ref ?–5.7)
OSMOLALITY SERPL CALC.SUM OF ELEC: 303 MOSM/KG (ref 275–295)
PHOSPHATE SERPL-MCNC: 3.5 MG/DL (ref 2.5–4.9)
POTASSIUM SERPL-SCNC: 3.5 MMOL/L (ref 3.5–5.1)
SODIUM SERPL-SCNC: 141 MMOL/L (ref 136–145)

## 2020-07-27 PROCEDURE — 3008F BODY MASS INDEX DOCD: CPT | Performed by: FAMILY MEDICINE

## 2020-07-27 PROCEDURE — 87389 HIV-1 AG W/HIV-1&-2 AB AG IA: CPT

## 2020-07-27 PROCEDURE — 99214 OFFICE O/P EST MOD 30 MIN: CPT | Performed by: FAMILY MEDICINE

## 2020-07-27 PROCEDURE — 80069 RENAL FUNCTION PANEL: CPT

## 2020-07-27 PROCEDURE — 3078F DIAST BP <80 MM HG: CPT | Performed by: FAMILY MEDICINE

## 2020-07-27 PROCEDURE — 36415 COLL VENOUS BLD VENIPUNCTURE: CPT

## 2020-07-27 PROCEDURE — 83036 HEMOGLOBIN GLYCOSYLATED A1C: CPT | Performed by: FAMILY MEDICINE

## 2020-07-27 PROCEDURE — 3074F SYST BP LT 130 MM HG: CPT | Performed by: FAMILY MEDICINE

## 2020-07-27 RX ORDER — CEPHALEXIN 500 MG/1
500 CAPSULE ORAL 3 TIMES DAILY
Qty: 15 CAPSULE | Refills: 0 | Status: SHIPPED | OUTPATIENT
Start: 2020-07-27 | End: 2020-08-01

## 2020-07-27 NOTE — TELEPHONE ENCOUNTER
Action Requested: Summary for Provider     []  Critical Lab, Recommendations Needed  [] Need Additional Advice  []   FYI    []   Need Orders  [] Need Medications Sent to Pharmacy  []  Other     SUMMARY: Spoke with the patient who reports she noticed 2 days

## 2020-07-27 NOTE — PROGRESS NOTES
HPI:    Patient ID: Vladimir Peterson is a 62year old female.     HPI  Patient presents with:  Vaginal Problem: bumps pt states pus is coming out X 3 days sore when toughing     Patient states she has been having some sores on her labial area for the last few injury on the right labia. There is no rash, tenderness, lesion or injury on the left labia. No vaginal discharge. Genitourinary Comments: Right labia there appears to be enlarged Bartholin cysts. Very minimal tenderness. No drainage noted.   No r

## 2020-07-28 DIAGNOSIS — I10 ESSENTIAL HYPERTENSION: ICD-10-CM

## 2020-07-28 DIAGNOSIS — N28.9 DECREASED RENAL FUNCTION: Primary | ICD-10-CM

## 2020-07-28 NOTE — TELEPHONE ENCOUNTER
From: Rudi Tarango  To: 59 Isa Gutierrez MD  Sent: 7/27/2020 8:45 PM CDT  Subject: Visit Follow-up Question    I have a question about HEMOGLOBIN A1C resulted on 7/27/20 at 5:23 PM. Can we do a video tomorrow

## 2020-07-28 NOTE — TELEPHONE ENCOUNTER
Spoke with the patient and she verbalized consent to a virtual visit. Video visit was scheduled for tomorrow 7/29/20. Patient was made aware of HIV result. Patient voiced understanding.

## 2020-07-29 ENCOUNTER — TELEMEDICINE (OUTPATIENT)
Dept: FAMILY MEDICINE CLINIC | Facility: CLINIC | Age: 55
End: 2020-07-29

## 2020-07-29 VITALS — BODY MASS INDEX: 41.48 KG/M2 | HEIGHT: 65 IN | WEIGHT: 249 LBS

## 2020-07-29 DIAGNOSIS — E78.00 HYPERCHOLESTEREMIA: ICD-10-CM

## 2020-07-29 DIAGNOSIS — N28.9 DECREASED RENAL FUNCTION: ICD-10-CM

## 2020-07-29 DIAGNOSIS — I10 ESSENTIAL HYPERTENSION: ICD-10-CM

## 2020-07-29 DIAGNOSIS — E11.9 CONTROLLED TYPE 2 DIABETES MELLITUS WITHOUT COMPLICATION, WITHOUT LONG-TERM CURRENT USE OF INSULIN (HCC): Primary | ICD-10-CM

## 2020-07-29 PROCEDURE — 99214 OFFICE O/P EST MOD 30 MIN: CPT | Performed by: FAMILY MEDICINE

## 2020-07-29 PROCEDURE — 3008F BODY MASS INDEX DOCD: CPT | Performed by: FAMILY MEDICINE

## 2020-07-29 NOTE — PROGRESS NOTES
This visit is conducted using Telemedicine with live, interactive video and audio during this Coronavirus pandemic. Please note that the following visit was completed using two-way, real-time interactive audio and/or video communication.   This has been Alcoholism ([facility] [address]     Essential hypertension     Vitamin D deficiency     Decreased renal function     Adjustment disorder with mixed anxiety and depressed mood     Controlled type 2 diabetes mellitus without complication, without long-term current use of i the phone. Throat/Neck: normal sound to voice. Respiratory:  non-labored. no increased work of breathing.     Skin:  normal moisture and skin texture, normal color of skin, no jaundice  Hematologic:  no excessive bruising  Psychiatric:  oriented to time, Encounter      Hemoglobin A1C [E]      Comp Metabolic Panel (14)      Lipid Panel [E]      Microalb/Creat Ratio, Random Urine      Meds This Visit:  Requested Prescriptions      No prescriptions requested or ordered in this encounter       Imaging & Referr

## 2020-08-09 RX ORDER — FLUTICASONE PROPIONATE 50 MCG
SPRAY, SUSPENSION (ML) NASAL
Qty: 1 BOTTLE | Refills: 1 | Status: SHIPPED | OUTPATIENT
Start: 2020-08-09 | End: 2020-11-19

## 2020-08-11 ENCOUNTER — OFFICE VISIT (OUTPATIENT)
Dept: NEPHROLOGY | Facility: CLINIC | Age: 55
End: 2020-08-11
Payer: COMMERCIAL

## 2020-08-11 ENCOUNTER — HOSPITAL ENCOUNTER (OUTPATIENT)
Dept: ENDOCRINOLOGY | Facility: HOSPITAL | Age: 55
Discharge: HOME | End: 2020-08-11
Attending: FAMILY MEDICINE
Payer: COMMERCIAL

## 2020-08-11 VITALS
DIASTOLIC BLOOD PRESSURE: 84 MMHG | TEMPERATURE: 98 F | HEART RATE: 80 BPM | SYSTOLIC BLOOD PRESSURE: 136 MMHG | WEIGHT: 250 LBS | HEIGHT: 64 IN | BODY MASS INDEX: 42.68 KG/M2

## 2020-08-11 DIAGNOSIS — N18.30 CKD (CHRONIC KIDNEY DISEASE), STAGE III (HCC): Primary | ICD-10-CM

## 2020-08-11 DIAGNOSIS — E11.9 CONTROLLED TYPE 2 DIABETES MELLITUS WITHOUT COMPLICATION, WITHOUT LONG-TERM CURRENT USE OF INSULIN (HCC): ICD-10-CM

## 2020-08-11 DIAGNOSIS — I10 HYPERTENSION, UNSPECIFIED TYPE: ICD-10-CM

## 2020-08-11 PROCEDURE — 3008F BODY MASS INDEX DOCD: CPT | Performed by: INTERNAL MEDICINE

## 2020-08-11 PROCEDURE — 3075F SYST BP GE 130 - 139MM HG: CPT | Performed by: INTERNAL MEDICINE

## 2020-08-11 PROCEDURE — 99244 OFF/OP CNSLTJ NEW/EST MOD 40: CPT | Performed by: INTERNAL MEDICINE

## 2020-08-11 PROCEDURE — 3079F DIAST BP 80-89 MM HG: CPT | Performed by: INTERNAL MEDICINE

## 2020-08-11 PROCEDURE — 97802 MEDICAL NUTRITION INDIV IN: CPT

## 2020-08-11 NOTE — PROGRESS NOTES
Consult Requested By: Dr. Beryle Chain    Reason for Consult: CKD stage III     HPI:     Patient is a 62 yrs old female with pmh of CKD stage III, HTN, recent diagnosis of DM, obesity who presented for work up and evaluation of kidney disease     Patient standard drinks      Types: 2 Shots of liquor per week      Comment: marco, 3 x week    Drug use: No       Medications (Active prior to today's visit):  Current Outpatient Medications   Medication Sig Dispense Refill   • FLUTICASONE PROPIONATE 50 MCG/AC motion is intact  Nose/Mouth/Throat:mucous membranes are moist   Neck/Thyroid: neck is supple without adenopathy  Lymphatic: no abnormal cervical, supraclavicular adenopathy is noted  Respiratory:  lungs are clear to auscultation bilaterally  Cardiovascula MD

## 2020-08-11 NOTE — PROGRESS NOTES
Medical Nutrition Therapy Assessment    Ghazala Vargas 10/25/1965 was seen for individual Diabetic Medical Nutrition Therapy/ Initial/ Individual:    Date: 8/11/2020   Start time 1715  End time: 1815    Assessment  New onset type 2    Anthropometrics:   Ther basic meal planning guidelines for diabetes regular mealtime, limited concentrated sweets.  Worked on establishing eating pattern/timing of meals and snacks    [x] discussed in depth meal planning using the healthy eating with diabetes plate method with foc 9/26/20    Antonieta Tolbert, RD

## 2020-08-11 NOTE — PATIENT INSTRUCTIONS
Repeat lab test in 3 weeks   Kidney ultrasound - call to make an appointment   Follow up in 3-4 weeks   Bring home blood pressure readings at next visit

## 2020-08-12 ENCOUNTER — APPOINTMENT (OUTPATIENT)
Dept: LAB | Age: 55
End: 2020-08-12
Attending: INTERNAL MEDICINE
Payer: COMMERCIAL

## 2020-08-12 DIAGNOSIS — N18.30 CKD (CHRONIC KIDNEY DISEASE), STAGE III (HCC): ICD-10-CM

## 2020-08-12 LAB
ALBUMIN SERPL-MCNC: 3.7 G/DL (ref 3.4–5)
ANION GAP SERPL CALC-SCNC: 6 MMOL/L (ref 0–18)
BILIRUB UR QL: NEGATIVE
BUN BLD-MCNC: 25 MG/DL (ref 7–18)
BUN/CREAT SERPL: 21.6 (ref 10–20)
CALCIUM BLD-MCNC: 9 MG/DL (ref 8.5–10.1)
CHLORIDE SERPL-SCNC: 108 MMOL/L (ref 98–112)
CO2 SERPL-SCNC: 28 MMOL/L (ref 21–32)
COLOR UR: YELLOW
CREAT BLD-MCNC: 1.16 MG/DL (ref 0.55–1.02)
CREAT UR-SCNC: 146 MG/DL
GLUCOSE BLD-MCNC: 124 MG/DL (ref 70–99)
GLUCOSE UR-MCNC: NEGATIVE MG/DL
HGB UR QL STRIP.AUTO: NEGATIVE
HYALINE CASTS #/AREA URNS AUTO: 1 /LPF
KETONES UR-MCNC: NEGATIVE MG/DL
MICROALBUMIN UR-MCNC: 8.96 MG/DL
MICROALBUMIN/CREAT 24H UR-RTO: 61.4 UG/MG (ref ?–30)
NITRITE UR QL STRIP.AUTO: NEGATIVE
OSMOLALITY SERPL CALC.SUM OF ELEC: 300 MOSM/KG (ref 275–295)
PH UR: 6 [PH] (ref 5–8)
PHOSPHATE SERPL-MCNC: 2.6 MG/DL (ref 2.5–4.9)
POTASSIUM SERPL-SCNC: 3.4 MMOL/L (ref 3.5–5.1)
PROT UR-MCNC: 25.6 MG/DL
PROT UR-MCNC: 30 MG/DL
RBC #/AREA URNS AUTO: 1 /HPF
SODIUM SERPL-SCNC: 142 MMOL/L (ref 136–145)
SP GR UR STRIP: 1.02 (ref 1–1.03)
UROBILINOGEN UR STRIP-ACNC: <2
WBC #/AREA URNS AUTO: 4 /HPF

## 2020-08-12 PROCEDURE — 84156 ASSAY OF PROTEIN URINE: CPT

## 2020-08-12 PROCEDURE — 82043 UR ALBUMIN QUANTITATIVE: CPT

## 2020-08-12 PROCEDURE — 82570 ASSAY OF URINE CREATININE: CPT

## 2020-08-12 PROCEDURE — 80069 RENAL FUNCTION PANEL: CPT

## 2020-08-12 PROCEDURE — 36415 COLL VENOUS BLD VENIPUNCTURE: CPT

## 2020-08-12 PROCEDURE — 81001 URINALYSIS AUTO W/SCOPE: CPT

## 2020-08-12 PROCEDURE — 84165 PROTEIN E-PHORESIS SERUM: CPT

## 2020-08-12 PROCEDURE — 83883 ASSAY NEPHELOMETRY NOT SPEC: CPT

## 2020-08-13 LAB
ALBUMIN SERPL ELPH-MCNC: 4.32 G/DL (ref 3.75–5.21)
ALBUMIN/GLOB SERPL: 1.55 {RATIO} (ref 1–2)
ALPHA1 GLOB SERPL ELPH-MCNC: 0.27 G/DL (ref 0.19–0.46)
ALPHA2 GLOB SERPL ELPH-MCNC: 0.7 G/DL (ref 0.48–1.05)
B-GLOBULIN SERPL ELPH-MCNC: 0.78 G/DL (ref 0.68–1.23)
GAMMA GLOB SERPL ELPH-MCNC: 1.04 G/DL (ref 0.62–1.7)
KAPPA FREE LIGHT CHAIN: 2.48 MG/DL (ref 0.33–1.94)
KAPPA/LAMBDA FLC RATIO: 2 (ref 0.26–1.65)
LAMBDA FREE LIGHT CHAIN: 1.24 MG/DL (ref 0.57–2.63)
M PROTEIN MFR SERPL ELPH: 7.1 G/DL (ref 6.4–8.2)

## 2020-08-15 ENCOUNTER — HOSPITAL ENCOUNTER (OUTPATIENT)
Dept: ULTRASOUND IMAGING | Age: 55
Discharge: HOME | End: 2020-08-15
Attending: INTERNAL MEDICINE
Payer: COMMERCIAL

## 2020-08-15 DIAGNOSIS — N18.30 CKD (CHRONIC KIDNEY DISEASE), STAGE III (HCC): ICD-10-CM

## 2020-08-15 PROCEDURE — 76770 US EXAM ABDO BACK WALL COMP: CPT | Performed by: INTERNAL MEDICINE

## 2020-08-17 ENCOUNTER — TELEPHONE (OUTPATIENT)
Dept: NEPHROLOGY | Facility: CLINIC | Age: 55
End: 2020-08-17

## 2020-08-17 ENCOUNTER — TELEPHONE (OUTPATIENT)
Dept: FAMILY MEDICINE CLINIC | Facility: CLINIC | Age: 55
End: 2020-08-17

## 2020-08-17 NOTE — TELEPHONE ENCOUNTER
Pt inquiring on her test results from 8/12/20 ordered by Dr Donald Vickers. Transferred call to nephrology dept clinical staff ext [phone].

## 2020-08-17 NOTE — TELEPHONE ENCOUNTER
See MyChart. Dr. Laila Sosa put results under UA. Improve kidney function.  Increase oral intake of potassium for low potassium     UA showed mild protein and urine quantification showed very small amount of urine protein     Will discuss further at your up

## 2020-08-17 NOTE — TELEPHONE ENCOUNTER
Patient wants to know how bad her kidney function is and what she can do to help it so it doesn't get worse.

## 2020-08-17 NOTE — TELEPHONE ENCOUNTER
Patient contacted. She is aware of results in My Chart. Discussed potassium. Mailed handout to patient for food choices.

## 2020-08-17 NOTE — TELEPHONE ENCOUNTER
She has stage III kidney disease.  Loosing weight, reducing alcohol intake , good diabetic and blood pressure control will reduce progression of kidney disease       This was discussed in detail at last office visit and will re discuss it at her upcomin

## 2020-08-20 ENCOUNTER — TELEPHONE (OUTPATIENT)
Dept: INTERNAL MEDICINE CLINIC | Facility: CLINIC | Age: 55
End: 2020-08-20

## 2020-08-20 NOTE — TELEPHONE ENCOUNTER
Please have her bring her blood pressure readings from home and to bring her blood pressure machine as well. Maintain low-sodium diet.   Please schedule office visit

## 2020-08-20 NOTE — TELEPHONE ENCOUNTER
Advised patient of Dr Yvonne Owusu  note. Patient verbalized understanding and needs to come in at a later time due to work. Dr. Christina Chavez, can we schedule her in [facility] on 8/26/20 at the 4:45 pm spot?     RN: please call pt and schedule if yes

## 2020-08-20 NOTE — TELEPHONE ENCOUNTER
Action Requested: Summary for Provider     []  Critical Lab, Recommendations Needed  [] Need Additional Advice  []   FYI    []   Need Orders  [] Need Medications Sent to Pharmacy  []  Other     SUMMARY: Dr Grace Sykes patient reports her BP is increased again, to

## 2020-08-24 ENCOUNTER — TELEPHONE (OUTPATIENT)
Dept: FAMILY MEDICINE CLINIC | Facility: CLINIC | Age: 55
End: 2020-08-24

## 2020-08-24 NOTE — TELEPHONE ENCOUNTER
Recommend pushing at least 64 to 80 ounces of water daily. Dehydration can sometimes cause dizziness. Ensure decreasing caffeine intake as well as avoiding alcohol. If continues to feel dizzy or any chest pain should go ER.   Blood pressures otherwise lo

## 2020-08-24 NOTE — TELEPHONE ENCOUNTER
FLUTICASONE PROPIONATE 50 MCG/ACT Nasal Suspension 1 Bottle 1 8/9/2020     Sig: INSTILL 2 SPRAYS IN EACH NOSTRIL NIGHTLY    Sent to pharmacy as: Fluticasone Propionate 50 MCG/ACT Nasal Suspension (FLONASE)    E-Prescribing Status: Receipt confirmed by phar

## 2020-08-24 NOTE — TELEPHONE ENCOUNTER
Pt has appt scheduled for 8/26/20 for BP (see below) but concerned as states BP yesterday= 107/77; this morning= 113/81; a few mins ago= 113/78.  States feels a little dizzy with mild nausea for past few hours (even though is currently resting); mentions wo

## 2020-08-24 NOTE — TELEPHONE ENCOUNTER
Spoke with patient ( verified) and relayed Dr. Marc Johnson message below--to go to ER if worsening symptoms--patient verbalizes understanding and agreement. \"I do already drink 7 bottles of water a day.  If I drink any fluid, I feel it in my right lower

## 2020-09-01 ENCOUNTER — OFFICE VISIT (OUTPATIENT)
Dept: NEPHROLOGY | Facility: CLINIC | Age: 55
End: 2020-09-01
Payer: COMMERCIAL

## 2020-09-01 VITALS
HEIGHT: 65 IN | TEMPERATURE: 97 F | BODY MASS INDEX: 40.38 KG/M2 | HEART RATE: 74 BPM | DIASTOLIC BLOOD PRESSURE: 74 MMHG | SYSTOLIC BLOOD PRESSURE: 120 MMHG | WEIGHT: 242.38 LBS

## 2020-09-01 DIAGNOSIS — I10 HYPERTENSION, UNSPECIFIED TYPE: ICD-10-CM

## 2020-09-01 DIAGNOSIS — N18.30 CKD (CHRONIC KIDNEY DISEASE), STAGE III (HCC): Primary | ICD-10-CM

## 2020-09-01 PROCEDURE — 3008F BODY MASS INDEX DOCD: CPT | Performed by: INTERNAL MEDICINE

## 2020-09-01 PROCEDURE — 99214 OFFICE O/P EST MOD 30 MIN: CPT | Performed by: INTERNAL MEDICINE

## 2020-09-01 PROCEDURE — 3078F DIAST BP <80 MM HG: CPT | Performed by: INTERNAL MEDICINE

## 2020-09-01 PROCEDURE — 3074F SYST BP LT 130 MM HG: CPT | Performed by: INTERNAL MEDICINE

## 2020-09-01 NOTE — PROGRESS NOTES
Progress Note:      Patient is a 62 yrs old female with pmh of CKD stage III, HTN, recent diagnosis of DM, obesity who presented for follow up     Patient was last seen in 2016    Lab test showed BUN/Cr 42/1.48 mg/dl with an eGFR 45 ml/min.  Creatinine 3 x week    Drug use: No       Medications (Active prior to today's visit):  Current Outpatient Medications   Medication Sig Dispense Refill   • FLUTICASONE PROPIONATE 50 MCG/ACT Nasal Suspension INSTILL 2 SPRAYS IN EACH NOSTRIL NIGHTLY 1 Bottle 1   • Meto neck is supple without adenopathy  Lymphatic: no abnormal cervical, supraclavicular adenopathy is noted  Respiratory:  lungs are clear to auscultation bilaterally  Cardiovascular: regular rate and rhythm  Abdomen: soft, non-tender, non-distended, BS normal

## 2020-09-01 NOTE — PATIENT INSTRUCTIONS
Follow up in 3 months   Lab test prior to next visit   Take over the counter vitamin D3 2000 units a day

## 2020-09-24 ENCOUNTER — TELEPHONE (OUTPATIENT)
Dept: FAMILY MEDICINE CLINIC | Facility: CLINIC | Age: 55
End: 2020-09-24

## 2020-09-24 DIAGNOSIS — R35.0 URINARY FREQUENCY: Primary | ICD-10-CM

## 2020-09-24 NOTE — TELEPHONE ENCOUNTER
The patient was called and informed. She stated at all times she does not make it the bathroom. Instructed she has to make an appointment to have the lab completed.

## 2020-09-24 NOTE — TELEPHONE ENCOUNTER
Patient states for over a month she's been having urine frequency and not always making it to the bathroom at times. Denies pain, burning, blood in urine, fever, abdominal/back pain.     Patient asking if this could be related to having an elevated blood s

## 2020-09-25 ENCOUNTER — LAB ENCOUNTER (OUTPATIENT)
Dept: LAB | Age: 55
End: 2020-09-25
Attending: FAMILY MEDICINE
Payer: COMMERCIAL

## 2020-09-25 DIAGNOSIS — E11.9 CONTROLLED TYPE 2 DIABETES MELLITUS WITHOUT COMPLICATION, WITHOUT LONG-TERM CURRENT USE OF INSULIN (HCC): ICD-10-CM

## 2020-09-25 DIAGNOSIS — E78.00 HYPERCHOLESTEREMIA: ICD-10-CM

## 2020-09-25 LAB
ALBUMIN SERPL-MCNC: 4.1 G/DL (ref 3.4–5)
ALBUMIN/GLOB SERPL: 1.1 {RATIO} (ref 1–2)
ALP LIVER SERPL-CCNC: 73 U/L
ALT SERPL-CCNC: 29 U/L
ANION GAP SERPL CALC-SCNC: 4 MMOL/L (ref 0–18)
AST SERPL-CCNC: 23 U/L (ref 15–37)
BILIRUB SERPL-MCNC: 0.3 MG/DL (ref 0.1–2)
BILIRUB UR QL: NEGATIVE
BUN BLD-MCNC: 25 MG/DL (ref 7–18)
BUN/CREAT SERPL: 18.5 (ref 10–20)
CALCIUM BLD-MCNC: 9.7 MG/DL (ref 8.5–10.1)
CHLORIDE SERPL-SCNC: 110 MMOL/L (ref 98–112)
CHOLEST SMN-MCNC: 221 MG/DL (ref ?–200)
CLARITY UR: CLEAR
CO2 SERPL-SCNC: 28 MMOL/L (ref 21–32)
COLOR UR: YELLOW
CREAT BLD-MCNC: 1.35 MG/DL
CREAT UR-SCNC: 123 MG/DL
EST. AVERAGE GLUCOSE BLD GHB EST-MCNC: 128 MG/DL (ref 68–126)
GLOBULIN PLAS-MCNC: 3.7 G/DL (ref 2.8–4.4)
GLUCOSE BLD-MCNC: 84 MG/DL (ref 70–99)
GLUCOSE UR-MCNC: NEGATIVE MG/DL
HBA1C MFR BLD HPLC: 6.1 % (ref ?–5.7)
HDLC SERPL-MCNC: 41 MG/DL (ref 40–59)
HGB UR QL STRIP.AUTO: NEGATIVE
KETONES UR-MCNC: NEGATIVE MG/DL
LDLC SERPL CALC-MCNC: 162 MG/DL (ref ?–100)
LEUKOCYTE ESTERASE UR QL STRIP.AUTO: NEGATIVE
M PROTEIN MFR SERPL ELPH: 7.8 G/DL (ref 6.4–8.2)
MICROALBUMIN UR-MCNC: 5.67 MG/DL
MICROALBUMIN/CREAT 24H UR-RTO: 46.1 UG/MG (ref ?–30)
NITRITE UR QL STRIP.AUTO: NEGATIVE
NONHDLC SERPL-MCNC: 180 MG/DL (ref ?–130)
OSMOLALITY SERPL CALC.SUM OF ELEC: 298 MOSM/KG (ref 275–295)
PATIENT FASTING Y/N/NP: YES
PATIENT FASTING Y/N/NP: YES
PH UR: 6 [PH] (ref 5–8)
POTASSIUM SERPL-SCNC: 3.4 MMOL/L (ref 3.5–5.1)
PROT UR-MCNC: 30 MG/DL
RBC #/AREA URNS AUTO: <1 /HPF
SODIUM SERPL-SCNC: 142 MMOL/L (ref 136–145)
SP GR UR STRIP: 1.01 (ref 1–1.03)
TRIGL SERPL-MCNC: 92 MG/DL (ref 30–149)
UROBILINOGEN UR STRIP-ACNC: <2
VLDLC SERPL CALC-MCNC: 18 MG/DL (ref 0–30)
WBC #/AREA URNS AUTO: 1 /HPF

## 2020-09-25 PROCEDURE — 81001 URINALYSIS AUTO W/SCOPE: CPT | Performed by: FAMILY MEDICINE

## 2020-09-25 PROCEDURE — 36415 COLL VENOUS BLD VENIPUNCTURE: CPT

## 2020-09-25 PROCEDURE — 80061 LIPID PANEL: CPT

## 2020-09-25 PROCEDURE — 83036 HEMOGLOBIN GLYCOSYLATED A1C: CPT

## 2020-09-25 PROCEDURE — 82570 ASSAY OF URINE CREATININE: CPT

## 2020-09-25 PROCEDURE — 80053 COMPREHEN METABOLIC PANEL: CPT

## 2020-09-25 PROCEDURE — 82043 UR ALBUMIN QUANTITATIVE: CPT

## 2020-09-26 ENCOUNTER — HOSPITAL ENCOUNTER (OUTPATIENT)
Dept: ENDOCRINOLOGY | Facility: HOSPITAL | Age: 55
Discharge: HOME | End: 2020-09-26
Attending: FAMILY MEDICINE
Payer: COMMERCIAL

## 2020-09-26 VITALS — BODY MASS INDEX: 39 KG/M2 | WEIGHT: 234 LBS

## 2020-09-26 DIAGNOSIS — E11.9 CONTROLLED TYPE 2 DIABETES MELLITUS WITHOUT COMPLICATION, WITHOUT LONG-TERM CURRENT USE OF INSULIN (HCC): Primary | ICD-10-CM

## 2020-09-26 PROCEDURE — 97803 MED NUTRITION INDIV SUBSEQ: CPT

## 2020-09-26 NOTE — PROGRESS NOTES
Medical Nutrition Therapy Assessment    Bette Fallon 10/25/1965 was seen for individual Diabetic Medical Nutrition Therapy Follow Up/ Individual:    Date: 9/26/2020   Start time 0915  End time: 1015    Assessment  New Onset; pt's wt and A1c are down from of meals and snacks    [x] discussed in depth meal planning using the healthy eating with diabetes plate method with focus on balanced macronutrient consumption, including identifying foods that are carbohydrates, lean protein, non-starchy vegetables, and [phone] (option 3) for problems/concerns. Follow up scheduled for 11/7/2020.     Dawson Cobian RD

## 2020-09-28 ENCOUNTER — TELEPHONE (OUTPATIENT)
Dept: FAMILY MEDICINE CLINIC | Facility: CLINIC | Age: 55
End: 2020-09-28

## 2020-09-28 NOTE — TELEPHONE ENCOUNTER
Patient contacted (Name and  of pt verified). All results and recommendations reviewed. Patient verbalizes understanding, denies further questions and agrees with plan of care. \    Appointment made for 10/1/2020.   The patient asked for a physical along

## 2020-10-01 ENCOUNTER — OFFICE VISIT (OUTPATIENT)
Dept: FAMILY MEDICINE CLINIC | Facility: CLINIC | Age: 55
End: 2020-10-01
Payer: COMMERCIAL

## 2020-10-01 ENCOUNTER — LAB ENCOUNTER (OUTPATIENT)
Dept: LAB | Age: 55
End: 2020-10-01
Attending: FAMILY MEDICINE
Payer: COMMERCIAL

## 2020-10-01 VITALS
DIASTOLIC BLOOD PRESSURE: 88 MMHG | HEART RATE: 79 BPM | TEMPERATURE: 97 F | BODY MASS INDEX: 38.99 KG/M2 | HEIGHT: 65 IN | SYSTOLIC BLOOD PRESSURE: 134 MMHG | WEIGHT: 234 LBS

## 2020-10-01 DIAGNOSIS — Z01.419 ENCOUNTER FOR GYNECOLOGICAL EXAMINATION: ICD-10-CM

## 2020-10-01 DIAGNOSIS — E66.9 OBESITY (BMI 30-39.9): ICD-10-CM

## 2020-10-01 DIAGNOSIS — Z12.31 ENCOUNTER FOR SCREENING MAMMOGRAM FOR BREAST CANCER: ICD-10-CM

## 2020-10-01 DIAGNOSIS — E66.01 MORBID OBESITY WITH BMI OF 40.0-44.9, ADULT (HCC): ICD-10-CM

## 2020-10-01 DIAGNOSIS — E55.9 VITAMIN D DEFICIENCY: ICD-10-CM

## 2020-10-01 DIAGNOSIS — N28.9 DECREASED RENAL FUNCTION: ICD-10-CM

## 2020-10-01 DIAGNOSIS — Z00.00 WELL ADULT EXAM: Primary | ICD-10-CM

## 2020-10-01 DIAGNOSIS — I10 ESSENTIAL HYPERTENSION: ICD-10-CM

## 2020-10-01 DIAGNOSIS — E78.00 HYPERCHOLESTEREMIA: ICD-10-CM

## 2020-10-01 DIAGNOSIS — Z23 NEED FOR PNEUMOCOCCAL VACCINATION: ICD-10-CM

## 2020-10-01 DIAGNOSIS — Z00.00 WELL ADULT EXAM: ICD-10-CM

## 2020-10-01 DIAGNOSIS — M21.42 FLAT FEET, BILATERAL: ICD-10-CM

## 2020-10-01 DIAGNOSIS — E11.9 CONTROLLED TYPE 2 DIABETES MELLITUS WITHOUT COMPLICATION, WITHOUT LONG-TERM CURRENT USE OF INSULIN (HCC): ICD-10-CM

## 2020-10-01 DIAGNOSIS — M21.41 FLAT FEET, BILATERAL: ICD-10-CM

## 2020-10-01 DIAGNOSIS — E87.6 HYPOKALEMIA: ICD-10-CM

## 2020-10-01 DIAGNOSIS — F10.20 ALCOHOLISM (HCC): ICD-10-CM

## 2020-10-01 PROBLEM — Z11.3 SCREENING EXAMINATION FOR STD (SEXUALLY TRANSMITTED DISEASE): Status: RESOLVED | Noted: 2018-07-05 | Resolved: 2020-10-01

## 2020-10-01 PROCEDURE — 85025 COMPLETE CBC W/AUTO DIFF WBC: CPT

## 2020-10-01 PROCEDURE — 3008F BODY MASS INDEX DOCD: CPT | Performed by: FAMILY MEDICINE

## 2020-10-01 PROCEDURE — 84443 ASSAY THYROID STIM HORMONE: CPT

## 2020-10-01 PROCEDURE — 99396 PREV VISIT EST AGE 40-64: CPT | Performed by: FAMILY MEDICINE

## 2020-10-01 PROCEDURE — 3079F DIAST BP 80-89 MM HG: CPT | Performed by: FAMILY MEDICINE

## 2020-10-01 PROCEDURE — 3075F SYST BP GE 130 - 139MM HG: CPT | Performed by: FAMILY MEDICINE

## 2020-10-01 PROCEDURE — 36415 COLL VENOUS BLD VENIPUNCTURE: CPT

## 2020-10-01 PROCEDURE — 82306 VITAMIN D 25 HYDROXY: CPT

## 2020-10-01 PROCEDURE — 90732 PPSV23 VACC 2 YRS+ SUBQ/IM: CPT | Performed by: FAMILY MEDICINE

## 2020-10-01 PROCEDURE — 90471 IMMUNIZATION ADMIN: CPT | Performed by: FAMILY MEDICINE

## 2020-10-01 RX ORDER — ATORVASTATIN CALCIUM 20 MG/1
20 TABLET, FILM COATED ORAL NIGHTLY
Qty: 90 TABLET | Refills: 1 | Status: SHIPPED | OUTPATIENT
Start: 2020-10-01 | End: 2021-04-08

## 2020-10-01 RX ORDER — POTASSIUM CHLORIDE 1500 MG/1
20 TABLET, FILM COATED, EXTENDED RELEASE ORAL DAILY
Qty: 90 TABLET | Refills: 1 | Status: SHIPPED | OUTPATIENT
Start: 2020-10-01 | End: 2021-05-10
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
## 2020-10-31 ENCOUNTER — HOSPITAL ENCOUNTER (OUTPATIENT)
Dept: MAMMOGRAPHY | Age: 55
Discharge: HOME | End: 2020-10-31
Attending: FAMILY MEDICINE
Payer: COMMERCIAL

## 2020-10-31 DIAGNOSIS — Z12.31 ENCOUNTER FOR SCREENING MAMMOGRAM FOR BREAST CANCER: ICD-10-CM

## 2020-10-31 PROCEDURE — 77067 SCR MAMMO BI INCL CAD: CPT | Performed by: FAMILY MEDICINE

## 2020-10-31 PROCEDURE — 77063 BREAST TOMOSYNTHESIS BI: CPT | Performed by: FAMILY MEDICINE

## 2020-11-19 RX ORDER — FLUTICASONE PROPIONATE 50 MCG
2 SPRAY, SUSPENSION (ML) NASAL NIGHTLY
Qty: 3 BOTTLE | Refills: 1 | Status: SHIPPED | OUTPATIENT
Start: 2020-11-19 | End: 2020-11-22

## 2020-11-19 NOTE — TELEPHONE ENCOUNTER
90 DAY SUPPLY: REQUEST FOR AUTHORIZATION:      •  FLUTICASONE PROPIONATE 50 MCG/ACT Nasal Suspension, INSTILL 2 SPRAYS IN EACH NOSTRIL NIGHTLY, Disp: 1 Bottle, Rfl: 1

## 2020-11-25 ENCOUNTER — TELEPHONE (OUTPATIENT)
Dept: FAMILY MEDICINE CLINIC | Facility: CLINIC | Age: 55
End: 2020-11-25

## 2020-11-25 ENCOUNTER — LAB ENCOUNTER (OUTPATIENT)
Dept: LAB | Age: 55
End: 2020-11-25
Attending: INTERNAL MEDICINE
Payer: COMMERCIAL

## 2020-11-25 ENCOUNTER — TELEMEDICINE (OUTPATIENT)
Dept: FAMILY MEDICINE CLINIC | Facility: CLINIC | Age: 55
End: 2020-11-25

## 2020-11-25 DIAGNOSIS — I10 ESSENTIAL HYPERTENSION: Primary | ICD-10-CM

## 2020-11-25 DIAGNOSIS — N18.30 CKD (CHRONIC KIDNEY DISEASE), STAGE III (HCC): ICD-10-CM

## 2020-11-25 DIAGNOSIS — E11.9 CONTROLLED TYPE 2 DIABETES MELLITUS WITHOUT COMPLICATION, WITHOUT LONG-TERM CURRENT USE OF INSULIN (HCC): ICD-10-CM

## 2020-11-25 DIAGNOSIS — E78.00 HYPERCHOLESTEREMIA: ICD-10-CM

## 2020-11-25 PROCEDURE — 80048 BASIC METABOLIC PNL TOTAL CA: CPT

## 2020-11-25 PROCEDURE — 36415 COLL VENOUS BLD VENIPUNCTURE: CPT

## 2020-11-25 PROCEDURE — 99213 OFFICE O/P EST LOW 20 MIN: CPT | Performed by: FAMILY MEDICINE
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
## 2020-11-25 NOTE — TELEPHONE ENCOUNTER
Patient interested in appt with PCP, eating healthier and losing weight, she is concerned. Past couple days, not eating healthy, still losing weight, she's concerned. Already scheduled to OV, wants video visit, transferred to Scheduling.     Viewed by Jennifer Arreola

## 2020-11-27 ENCOUNTER — TELEPHONE (OUTPATIENT)
Dept: NEPHROLOGY | Facility: CLINIC | Age: 55
End: 2020-11-27

## 2020-11-27 ENCOUNTER — TELEPHONE (OUTPATIENT)
Dept: FAMILY MEDICINE CLINIC | Facility: CLINIC | Age: 55
End: 2020-11-27

## 2020-11-27 NOTE — TELEPHONE ENCOUNTER
I called the patient. She wanted to know if her recent BMP was OK. Informed her of stable status below. Pt has upcoming appt scheduled 12/1/20 with Dr. Car Lutz. Reminded of appt date/time and advised will be discussed further at upcoming appt.  Pt had no f

## 2020-11-27 NOTE — TELEPHONE ENCOUNTER
Patient states someone just called her from [facility] office. RN reviewed chart. Nothing documented as to whom may have called her.

## 2020-12-01 ENCOUNTER — TELEPHONE (OUTPATIENT)
Dept: NEPHROLOGY | Facility: CLINIC | Age: 55
End: 2020-12-01

## 2020-12-17 ENCOUNTER — OFFICE VISIT (OUTPATIENT)
Dept: NEPHROLOGY | Facility: CLINIC | Age: 55
End: 2020-12-17
Payer: COMMERCIAL

## 2020-12-17 VITALS
WEIGHT: 224 LBS | SYSTOLIC BLOOD PRESSURE: 129 MMHG | HEART RATE: 76 BPM | DIASTOLIC BLOOD PRESSURE: 80 MMHG | BODY MASS INDEX: 37.32 KG/M2 | HEIGHT: 65 IN

## 2020-12-17 DIAGNOSIS — N18.30 STAGE 3 CHRONIC KIDNEY DISEASE, UNSPECIFIED WHETHER STAGE 3A OR 3B CKD (HCC): Primary | ICD-10-CM

## 2020-12-17 PROCEDURE — 3008F BODY MASS INDEX DOCD: CPT | Performed by: INTERNAL MEDICINE

## 2020-12-17 PROCEDURE — 3079F DIAST BP 80-89 MM HG: CPT | Performed by: INTERNAL MEDICINE

## 2020-12-17 PROCEDURE — 99213 OFFICE O/P EST LOW 20 MIN: CPT | Performed by: INTERNAL MEDICINE

## 2020-12-17 PROCEDURE — 3074F SYST BP LT 130 MM HG: CPT | Performed by: INTERNAL MEDICINE

## 2020-12-17 NOTE — PROGRESS NOTES
Progress Note:      Patient is a 60 yrs old female with pmh of CKD stage III, HTN, recent diagnosis of DM, obesity who presented for follow up     Patient was last seen in 2016    Lab test showed BUN/Cr 42/1.48 mg/dl with an eGFR 45 ml/min.  Creatinine Shots of liquor per week      Comment: marco, 3 x week    Drug use: No       Medications (Active prior to today's visit):  Current Outpatient Medications   Medication Sig Dispense Refill   • atorvastatin 20 MG Oral Tab Take 1 tablet (20 mg total) by laurence is noted  Respiratory:  lungs are clear to auscultation bilaterally  Cardiovascular: regular rate and rhythm  Abdomen: soft, non-tender, non-distended, BS normal  Skin/Hair: no unusual rashes present  Back/Spine: no abnormalities noted  Musculoskeletal:  n

## 2020-12-22 ENCOUNTER — LAB ENCOUNTER (OUTPATIENT)
Dept: LAB | Age: 55
End: 2020-12-22
Attending: FAMILY MEDICINE
Payer: COMMERCIAL

## 2021-01-04 ENCOUNTER — LAB ENCOUNTER (OUTPATIENT)
Dept: LAB | Age: 56
End: 2021-01-04
Attending: FAMILY MEDICINE
Payer: COMMERCIAL

## 2021-01-04 DIAGNOSIS — I10 ESSENTIAL HYPERTENSION: ICD-10-CM

## 2021-01-04 DIAGNOSIS — E78.00 HYPERCHOLESTEREMIA: ICD-10-CM

## 2021-01-04 DIAGNOSIS — E11.9 CONTROLLED TYPE 2 DIABETES MELLITUS WITHOUT COMPLICATION, WITHOUT LONG-TERM CURRENT USE OF INSULIN (HCC): ICD-10-CM

## 2021-01-04 LAB
ALBUMIN SERPL-MCNC: 3.8 G/DL (ref 3.4–5)
ALBUMIN/GLOB SERPL: 1.1 {RATIO} (ref 1–2)
ALP LIVER SERPL-CCNC: 67 U/L
ALT SERPL-CCNC: 40 U/L
ANION GAP SERPL CALC-SCNC: 5 MMOL/L (ref 0–18)
AST SERPL-CCNC: 20 U/L (ref 15–37)
BILIRUB SERPL-MCNC: 0.5 MG/DL (ref 0.1–2)
BUN BLD-MCNC: 25 MG/DL (ref 7–18)
BUN/CREAT SERPL: 19.8 (ref 10–20)
CALCIUM BLD-MCNC: 9.8 MG/DL (ref 8.5–10.1)
CHLORIDE SERPL-SCNC: 108 MMOL/L (ref 98–112)
CHOLEST SMN-MCNC: 154 MG/DL (ref ?–200)
CO2 SERPL-SCNC: 28 MMOL/L (ref 21–32)
CREAT BLD-MCNC: 1.26 MG/DL
EST. AVERAGE GLUCOSE BLD GHB EST-MCNC: 123 MG/DL (ref 68–126)
GLOBULIN PLAS-MCNC: 3.6 G/DL (ref 2.8–4.4)
GLUCOSE BLD-MCNC: 86 MG/DL (ref 70–99)
HBA1C MFR BLD HPLC: 5.9 % (ref ?–5.7)
HDLC SERPL-MCNC: 58 MG/DL (ref 40–59)
LDLC SERPL CALC-MCNC: 86 MG/DL (ref ?–100)
M PROTEIN MFR SERPL ELPH: 7.4 G/DL (ref 6.4–8.2)
NONHDLC SERPL-MCNC: 96 MG/DL (ref ?–130)
OSMOLALITY SERPL CALC.SUM OF ELEC: 296 MOSM/KG (ref 275–295)
PATIENT FASTING Y/N/NP: YES
PATIENT FASTING Y/N/NP: YES
POTASSIUM SERPL-SCNC: 3.5 MMOL/L (ref 3.5–5.1)
SODIUM SERPL-SCNC: 141 MMOL/L (ref 136–145)
TRIGL SERPL-MCNC: 52 MG/DL (ref 30–149)
VLDLC SERPL CALC-MCNC: 10 MG/DL (ref 0–30)

## 2021-01-04 PROCEDURE — 83036 HEMOGLOBIN GLYCOSYLATED A1C: CPT

## 2021-01-04 PROCEDURE — 80053 COMPREHEN METABOLIC PANEL: CPT

## 2021-01-04 PROCEDURE — 80061 LIPID PANEL: CPT

## 2021-01-04 PROCEDURE — 36415 COLL VENOUS BLD VENIPUNCTURE: CPT

## 2021-01-06 ENCOUNTER — OFFICE VISIT (OUTPATIENT)
Dept: FAMILY MEDICINE CLINIC | Facility: CLINIC | Age: 56
End: 2021-01-06
Payer: COMMERCIAL

## 2021-01-06 VITALS
DIASTOLIC BLOOD PRESSURE: 82 MMHG | TEMPERATURE: 98 F | WEIGHT: 218 LBS | SYSTOLIC BLOOD PRESSURE: 125 MMHG | HEART RATE: 77 BPM | HEIGHT: 65 IN | BODY MASS INDEX: 36.32 KG/M2

## 2021-01-06 DIAGNOSIS — F10.20 ALCOHOLISM (HCC): ICD-10-CM

## 2021-01-06 DIAGNOSIS — Z12.11 COLON CANCER SCREENING: ICD-10-CM

## 2021-01-06 DIAGNOSIS — H93.11 TINNITUS, RIGHT EAR: ICD-10-CM

## 2021-01-06 DIAGNOSIS — E66.9 OBESITY (BMI 30-39.9): ICD-10-CM

## 2021-01-06 DIAGNOSIS — I10 ESSENTIAL HYPERTENSION: Primary | ICD-10-CM

## 2021-01-06 DIAGNOSIS — E11.9 CONTROLLED TYPE 2 DIABETES MELLITUS WITHOUT COMPLICATION, WITHOUT LONG-TERM CURRENT USE OF INSULIN (HCC): ICD-10-CM

## 2021-01-06 DIAGNOSIS — N28.9 DECREASED RENAL FUNCTION: ICD-10-CM

## 2021-01-06 DIAGNOSIS — E78.00 HYPERCHOLESTEREMIA: ICD-10-CM

## 2021-01-06 DIAGNOSIS — R22.1 NECK MASS: ICD-10-CM

## 2021-01-06 PROCEDURE — 99214 OFFICE O/P EST MOD 30 MIN: CPT | Performed by: FAMILY MEDICINE

## 2021-01-06 PROCEDURE — 3074F SYST BP LT 130 MM HG: CPT | Performed by: FAMILY MEDICINE

## 2021-01-06 PROCEDURE — 3008F BODY MASS INDEX DOCD: CPT | Performed by: FAMILY MEDICINE

## 2021-01-06 PROCEDURE — 3079F DIAST BP 80-89 MM HG: CPT | Performed by: FAMILY MEDICINE

## 2021-01-10 NOTE — PROGRESS NOTES
HPI:    Patient ID: Marisol Velázquez is a 60year old female. HPI  Patient presents with:  Blood Pressure: follow up   Cholesterol: follow up       Patient overall states she is doing well. She states she is trying to work on her diet.   She has cut out he a week. • aspirin 325 MG Oral Tab Take 325 mg by mouth daily.        Allergies:  Seasonal                Runny nose   PHYSICAL EXAM:   Patient presents with:  Blood Pressure: follow up   Cholesterol: follow up      Physical Exam   Constitutional: She ap improved  Highly recommend to lose weight. Discussed good dietary and eating habits as well as increasing vegetable and fruit intake. Recommending avoiding foods high in fat content. Recommend exercising at least 30-40 minutes 5-6 days a week.   Avoid sk

## 2021-01-17 RX ORDER — ERGOCALCIFEROL 1.25 MG/1
CAPSULE ORAL
Qty: 12 CAPSULE | Refills: 3 | Status: SHIPPED | OUTPATIENT
Start: 2021-01-17 | End: 2021-11-29

## 2021-03-19 ENCOUNTER — NURSE TRIAGE (OUTPATIENT)
Dept: FAMILY MEDICINE CLINIC | Facility: CLINIC | Age: 56
End: 2021-03-19

## 2021-03-19 NOTE — TELEPHONE ENCOUNTER
Action Requested: Summary for Provider     []  Critical Lab, Recommendations Needed  [x] Need Additional Advice  []   FYI    []   Need Orders  [] Need Medications Sent to Pharmacy  []  Other     SUMMARY: pt is asking if doctor can order lab work for sympto

## 2021-03-20 ENCOUNTER — HOSPITAL ENCOUNTER (OUTPATIENT)
Dept: ENDOCRINOLOGY | Facility: HOSPITAL | Age: 56
Discharge: HOME | End: 2021-03-20
Attending: FAMILY MEDICINE
Payer: COMMERCIAL

## 2021-03-20 VITALS — WEIGHT: 214 LBS | BODY MASS INDEX: 36 KG/M2

## 2021-03-20 DIAGNOSIS — Z23 NEED FOR VACCINATION: ICD-10-CM

## 2021-03-20 PROCEDURE — 97803 MED NUTRITION INDIV SUBSEQ: CPT

## 2021-03-20 NOTE — PROGRESS NOTES
Medical Nutrition Therapy Assessment    Gabriella Keenan 10/25/1965 was seen for individual Diabetic Medical Nutrition Therapy/ Follow Up/ Individual:    Date: 3/20/2021   Start time 0910  End time: 1010      Assessment  6 month follow up visit.   Pt thought t nutrition-related knowledge deficit r/t diabetes as evidenced by A1c of 6.5%- resolving       IIntervention  Comprehensive Nutrition Education Provided:     [x] discussed healthy weight management, glucose management, lipid management, and BP management as Education on Increased Physical Activity:  discussed how increased physical activity improves insulin resistance, blood glucose control, and heart health    Monitoring  hemoglobin A1c, lipid panel and weight trends    Evaluation  Behavioral Goal(s) eileen

## 2021-03-20 NOTE — TELEPHONE ENCOUNTER
Called patient because she did not call back and office is closing. She states she saw the diabetic nutrionist today and they did not feel like her symptoms were related to her sugars given her diet and her lab work.  She states that she had very little

## 2021-03-20 NOTE — TELEPHONE ENCOUNTER
Advised patient of Dr Jozef Eid note. Patient states that she had called about dizziness and that her blood sugars were going up and down.  Tried to triage patient however she is driving to an appointment and states that she will call back before 12 pm.    Ramses Schuster

## 2021-03-24 ENCOUNTER — IMMUNIZATION (OUTPATIENT)
Dept: LAB | Age: 56
End: 2021-03-24
Attending: HOSPITALIST
Payer: COMMERCIAL

## 2021-03-24 DIAGNOSIS — Z23 NEED FOR VACCINATION: Primary | ICD-10-CM

## 2021-03-24 PROCEDURE — 0001A SARSCOV2 VAC 30MCG/0.3ML IM: CPT

## 2021-04-02 ENCOUNTER — TELEPHONE (OUTPATIENT)
Dept: GASTROENTEROLOGY | Facility: CLINIC | Age: 56
End: 2021-04-02

## 2021-04-02 NOTE — TELEPHONE ENCOUNTER
----- Message from Shiraz Platt, [address] [place] [address] Ute sent at 6/8/2018 11:21 AM CDT -----  Regarding: Recall colon   Recall colon in 3 years per GS.  Colon done 6-2-18

## 2021-04-08 DIAGNOSIS — E78.00 HYPERCHOLESTEREMIA: ICD-10-CM

## 2021-04-08 RX ORDER — ATORVASTATIN CALCIUM 20 MG/1
TABLET, FILM COATED ORAL
Qty: 90 TABLET | Refills: 1 | Status: SHIPPED | OUTPATIENT
Start: 2021-04-08 | End: 2021-11-29

## 2021-04-14 ENCOUNTER — IMMUNIZATION (OUTPATIENT)
Dept: LAB | Age: 56
End: 2021-04-14
Attending: HOSPITALIST
Payer: COMMERCIAL

## 2021-04-14 DIAGNOSIS — Z23 NEED FOR VACCINATION: Primary | ICD-10-CM

## 2021-04-14 PROCEDURE — 0002A SARSCOV2 VAC 30MCG/0.3ML IM: CPT

## 2021-05-07 DIAGNOSIS — E87.6 HYPOKALEMIA: ICD-10-CM

## 2021-05-10 RX ORDER — POTASSIUM CHLORIDE 1500 MG/1
TABLET, FILM COATED, EXTENDED RELEASE ORAL
Qty: 90 TABLET | Refills: 1 | Status: SHIPPED | OUTPATIENT
Start: 2021-05-10 | End: 2021-11-29

## 2021-05-17 ENCOUNTER — TELEPHONE (OUTPATIENT)
Dept: FAMILY MEDICINE CLINIC | Facility: CLINIC | Age: 56
End: 2021-05-17

## 2021-05-17 DIAGNOSIS — S05.02XD ABRASION OF LEFT CORNEA, SUBSEQUENT ENCOUNTER: Primary | ICD-10-CM

## 2021-05-17 NOTE — TELEPHONE ENCOUNTER
Patient paged provider at 6:05 p.m. She scratched her left eye with contact this morning. She states her red is and feeling stinging. Has difficulty with opening Has photophobia in that eye. Right eye is fine. No discharge noted.  Has not tried anything for

## 2021-05-18 NOTE — TELEPHONE ENCOUNTER
Received emergency room notes from [facility] emergency room. Patient was seen there for corneal abrasion. She was prescribed eyedrops. Please follow-up with patient and to follow-up with ophthalmology. Referral placed.

## 2021-05-19 NOTE — TELEPHONE ENCOUNTER
Left pt a detailed message to f/u with Dr Shanel Ayon and provided phone number also sent a mychart with above info.

## 2021-05-22 NOTE — TELEPHONE ENCOUNTER
Patient reports she has already followed up with her eye doctor and the issues with her eye have been cleared up. She will call back if any other concerns. Patient is thankful for the follow up.

## 2021-05-28 ENCOUNTER — TELEPHONE (OUTPATIENT)
Dept: FAMILY MEDICINE CLINIC | Facility: CLINIC | Age: 56
End: 2021-05-28

## 2021-05-28 NOTE — TELEPHONE ENCOUNTER
Patient scheduled an appointment via [facility] for the following symptoms:    Body hurting everyday/ issues with walking

## 2021-05-28 NOTE — TELEPHONE ENCOUNTER
Patient states for months now, when she gets out of bed, her lower body aches throughout most of the day. States she cannot take Tylenol or Ibuprofen to relieve her pain.   Unsure if she has arthritis as she denies joint pain, just a \"heavy\" type feeling

## 2021-06-01 ENCOUNTER — OFFICE VISIT (OUTPATIENT)
Dept: FAMILY MEDICINE CLINIC | Facility: CLINIC | Age: 56
End: 2021-06-01
Payer: COMMERCIAL

## 2021-06-01 VITALS
TEMPERATURE: 98 F | BODY MASS INDEX: 38.32 KG/M2 | HEART RATE: 71 BPM | WEIGHT: 230 LBS | HEIGHT: 65 IN | SYSTOLIC BLOOD PRESSURE: 121 MMHG | DIASTOLIC BLOOD PRESSURE: 73 MMHG

## 2021-06-01 DIAGNOSIS — Z20.6 HIV EXPOSURE: ICD-10-CM

## 2021-06-01 DIAGNOSIS — E78.00 HYPERCHOLESTEREMIA: ICD-10-CM

## 2021-06-01 DIAGNOSIS — Z11.4 SCREENING FOR HIV (HUMAN IMMUNODEFICIENCY VIRUS): ICD-10-CM

## 2021-06-01 DIAGNOSIS — E66.9 OBESITY (BMI 30-39.9): ICD-10-CM

## 2021-06-01 DIAGNOSIS — R52 BODY ACHES: Primary | ICD-10-CM

## 2021-06-01 DIAGNOSIS — E55.9 VITAMIN D DEFICIENCY: ICD-10-CM

## 2021-06-01 DIAGNOSIS — E11.9 CONTROLLED TYPE 2 DIABETES MELLITUS WITHOUT COMPLICATION, WITHOUT LONG-TERM CURRENT USE OF INSULIN (HCC): ICD-10-CM

## 2021-06-01 PROCEDURE — 99214 OFFICE O/P EST MOD 30 MIN: CPT | Performed by: FAMILY MEDICINE

## 2021-06-01 PROCEDURE — 3074F SYST BP LT 130 MM HG: CPT | Performed by: FAMILY MEDICINE

## 2021-06-01 PROCEDURE — 3078F DIAST BP <80 MM HG: CPT | Performed by: FAMILY MEDICINE

## 2021-06-01 PROCEDURE — 3008F BODY MASS INDEX DOCD: CPT | Performed by: FAMILY MEDICINE

## 2021-06-01 NOTE — PROGRESS NOTES
HPI:    Patient ID: Diane Rasheed is a 60year old female. HPI  Patient presents with: Other: bodyaches all over the body for about six months       patient complains of having body aches and feels like her joints hurt for the last few months.   She sta BY MOUTH ONE TIME PER WEEK 12 capsule 3   • Metoprolol Succinate ER 25 MG Oral Tablet 24 Hr Take 1 tablet (25 mg total) by mouth daily. 90 tablet 3   • Losartan Potassium-HCTZ 100-12.5 MG Oral Tab Take 1 tablet by mouth daily.  90 tablet 3   • amLODIPine Be medications. Possibility of statin related myalgias. Await results before holding  Statin  - CBC WITH DIFFERENTIAL WITH PLATELET; Future  - COMP METABOLIC PANEL (14); Future  - SED RATE, WESTERGREN (AUTOMATED);  Future  - YENNI, DIRECT SCREEN; Future  - RHE

## 2021-06-05 ENCOUNTER — LAB ENCOUNTER (OUTPATIENT)
Dept: LAB | Age: 56
End: 2021-06-05
Attending: FAMILY MEDICINE
Payer: COMMERCIAL

## 2021-06-05 DIAGNOSIS — Z11.4 SCREENING FOR HIV (HUMAN IMMUNODEFICIENCY VIRUS): ICD-10-CM

## 2021-06-05 DIAGNOSIS — Z20.6 HIV EXPOSURE: ICD-10-CM

## 2021-06-05 DIAGNOSIS — E11.9 CONTROLLED TYPE 2 DIABETES MELLITUS WITHOUT COMPLICATION, WITHOUT LONG-TERM CURRENT USE OF INSULIN (HCC): ICD-10-CM

## 2021-06-05 DIAGNOSIS — E55.9 VITAMIN D DEFICIENCY: ICD-10-CM

## 2021-06-05 DIAGNOSIS — E78.00 HYPERCHOLESTEREMIA: ICD-10-CM

## 2021-06-05 DIAGNOSIS — R52 BODY ACHES: ICD-10-CM

## 2021-06-05 PROCEDURE — 82570 ASSAY OF URINE CREATININE: CPT

## 2021-06-05 PROCEDURE — 3061F NEG MICROALBUMINURIA REV: CPT | Performed by: FAMILY MEDICINE

## 2021-06-05 PROCEDURE — 80053 COMPREHEN METABOLIC PANEL: CPT

## 2021-06-05 PROCEDURE — 80061 LIPID PANEL: CPT

## 2021-06-05 PROCEDURE — 83036 HEMOGLOBIN GLYCOSYLATED A1C: CPT

## 2021-06-05 PROCEDURE — 36415 COLL VENOUS BLD VENIPUNCTURE: CPT

## 2021-06-05 PROCEDURE — 3060F POS MICROALBUMINURIA REV: CPT | Performed by: FAMILY MEDICINE

## 2021-06-05 PROCEDURE — 3044F HG A1C LEVEL LT 7.0%: CPT | Performed by: FAMILY MEDICINE

## 2021-06-05 PROCEDURE — 85025 COMPLETE CBC W/AUTO DIFF WBC: CPT

## 2021-06-05 PROCEDURE — 85652 RBC SED RATE AUTOMATED: CPT

## 2021-06-05 PROCEDURE — 82306 VITAMIN D 25 HYDROXY: CPT

## 2021-06-05 PROCEDURE — 82043 UR ALBUMIN QUANTITATIVE: CPT

## 2021-06-05 PROCEDURE — 86038 ANTINUCLEAR ANTIBODIES: CPT

## 2021-06-05 PROCEDURE — 87389 HIV-1 AG W/HIV-1&-2 AB AG IA: CPT

## 2021-06-05 PROCEDURE — 86431 RHEUMATOID FACTOR QUANT: CPT

## 2021-06-10 ENCOUNTER — TELEPHONE (OUTPATIENT)
Dept: FAMILY MEDICINE CLINIC | Facility: CLINIC | Age: 56
End: 2021-06-10

## 2021-06-10 NOTE — TELEPHONE ENCOUNTER
Pt saw results collected 6/5/21, concern about HGA1C,advised monitor sugar IT, high carbs, increase activity  Please advise   Dr contacted, stated she was behind and will address today and contact pt

## 2021-06-13 NOTE — TELEPHONE ENCOUNTER
FYI - Pt rescheduled to 12/22/20.
Patient rescheduled the appointment for 12/22/2020. Informed  PSR ot this message.
Pt inquiring if appt scheduled for today at 2:20pm can be switched to a video visit.  Please call [phone]
Yes. Please let PSR know if some one calls and request video visit, please schedule them so
37.5

## 2021-07-21 ENCOUNTER — TELEPHONE (OUTPATIENT)
Dept: FAMILY MEDICINE CLINIC | Facility: CLINIC | Age: 56
End: 2021-07-21

## 2021-07-24 ENCOUNTER — NURSE TRIAGE (OUTPATIENT)
Dept: FAMILY MEDICINE CLINIC | Facility: CLINIC | Age: 56
End: 2021-07-24

## 2021-07-24 NOTE — TELEPHONE ENCOUNTER
Spoke with pt and MD message below given. Pt verb understanding. Pt states \"I am already taking all that\"    Advised will wait for Dr Carlos Fuelling for recommendations when she returns to clinic and pt agrees to plan.

## 2021-07-24 NOTE — TELEPHONE ENCOUNTER
Patient stated that saw Dr Manuela Esparza on 6/1/2021 for body aches. Patient still having the body aches. Patient even bought a cushion for her mattress figuring maybe the bed was too firm, but it has not helped.  Patient wanted to know if doctor could prescribed so

## 2021-07-24 NOTE — TELEPHONE ENCOUNTER
Message noted. Would recommend otc remedies like tylenol or ibuprofen/ aleve. Would defer to Dr Justo Swartz for any prescription pain medications.

## 2021-07-25 NOTE — TELEPHONE ENCOUNTER
Sent a detailed [employer] message. Please check to see if patient viewed the message.  If not, please attempt to follow up by phone

## 2021-07-25 NOTE — TELEPHONE ENCOUNTER
Recommend holding cholesterol medication for the next 1 month to see if this helps.   If no improvement would recommend seeing rheumatology

## 2021-08-03 DIAGNOSIS — I10 ESSENTIAL HYPERTENSION: ICD-10-CM

## 2021-08-05 RX ORDER — METOPROLOL SUCCINATE 25 MG/1
TABLET, EXTENDED RELEASE ORAL
Qty: 90 TABLET | Refills: 3 | Status: SHIPPED | OUTPATIENT
Start: 2021-08-05

## 2021-08-21 ENCOUNTER — OFFICE VISIT (OUTPATIENT)
Dept: FAMILY MEDICINE CLINIC | Facility: CLINIC | Age: 56
End: 2021-08-21
Payer: COMMERCIAL

## 2021-08-21 VITALS
DIASTOLIC BLOOD PRESSURE: 81 MMHG | SYSTOLIC BLOOD PRESSURE: 128 MMHG | TEMPERATURE: 97 F | BODY MASS INDEX: 37.32 KG/M2 | HEART RATE: 73 BPM | WEIGHT: 224 LBS | HEIGHT: 65 IN

## 2021-08-21 DIAGNOSIS — R52 GENERALIZED BODY ACHES: Primary | ICD-10-CM

## 2021-08-21 DIAGNOSIS — I83.811 VARICOSE VEINS OF LEG WITH PAIN, RIGHT: ICD-10-CM

## 2021-08-21 DIAGNOSIS — M25.562 LEFT KNEE PAIN, UNSPECIFIED CHRONICITY: ICD-10-CM

## 2021-08-21 PROCEDURE — 99214 OFFICE O/P EST MOD 30 MIN: CPT | Performed by: FAMILY MEDICINE

## 2021-08-21 PROCEDURE — 3074F SYST BP LT 130 MM HG: CPT | Performed by: FAMILY MEDICINE

## 2021-08-21 PROCEDURE — 3079F DIAST BP 80-89 MM HG: CPT | Performed by: FAMILY MEDICINE

## 2021-08-21 PROCEDURE — 3008F BODY MASS INDEX DOCD: CPT | Performed by: FAMILY MEDICINE

## 2021-08-21 RX ORDER — CYCLOBENZAPRINE HCL 10 MG
10 TABLET ORAL NIGHTLY PRN
Qty: 30 TABLET | Refills: 0 | Status: SHIPPED | OUTPATIENT
Start: 2021-08-21 | End: 2021-10-19

## 2021-08-21 NOTE — PROGRESS NOTES
HPI:    Patient ID: Oscar Woodard is a 60year old female.     HPI  Patient presents with:  Knee Pain: left knee for a few months   Follow - Up: on body aches since June happening every day     Patient last seen in June and had complained of body aches for 10 MG Oral Tab Take 1 tablet (10 mg total) by mouth nightly as needed for Muscle spasms.  30 tablet 0   • METOPROLOL SUCCINATE 25 MG Oral Tablet 24 Hr TAKE 1 TABLET BY MOUTH EVERY DAY 90 tablet 3   • POTASSIUM CHLORIDE ER 20 MEQ Oral Tab CR TAKE 1 TABLET BY Muscle spasms. Dispense: 30 tablet; Refill: 0    2. Left knee pain, unspecified chronicity    - XR KNEE ROUTINE (3 VIEWS), LEFT (CPT=73562); Future  - PHYSICAL THERAPY - INTERNAL    3.  Varicose veins of leg with pain, right    - VASCULAR SURGERY - INTERNA

## 2021-08-28 ENCOUNTER — HOSPITAL ENCOUNTER (OUTPATIENT)
Dept: GENERAL RADIOLOGY | Age: 56
Discharge: HOME | End: 2021-08-28
Attending: FAMILY MEDICINE
Payer: COMMERCIAL

## 2021-08-28 DIAGNOSIS — M25.562 LEFT KNEE PAIN, UNSPECIFIED CHRONICITY: ICD-10-CM

## 2021-08-28 PROCEDURE — 73562 X-RAY EXAM OF KNEE 3: CPT | Performed by: FAMILY MEDICINE

## 2021-09-09 ENCOUNTER — NURSE TRIAGE (OUTPATIENT)
Dept: FAMILY MEDICINE CLINIC | Facility: CLINIC | Age: 56
End: 2021-09-09

## 2021-09-09 NOTE — TELEPHONE ENCOUNTER
Action Requested: Summary for Provider     []  Critical Lab, Recommendations Needed  [] Need Additional Advice  []   FYI    []   Need Orders  [] Need Medications Sent to Pharmacy  []  Other     SUMMARY: OV tomorrow    Reason for call: Urinary Symptoms  Ons

## 2021-09-10 ENCOUNTER — TELEPHONE (OUTPATIENT)
Dept: FAMILY MEDICINE CLINIC | Facility: CLINIC | Age: 56
End: 2021-09-10

## 2021-09-10 ENCOUNTER — OFFICE VISIT (OUTPATIENT)
Dept: FAMILY MEDICINE CLINIC | Facility: CLINIC | Age: 56
End: 2021-09-10
Payer: COMMERCIAL

## 2021-09-10 ENCOUNTER — HOSPITAL ENCOUNTER (OUTPATIENT)
Dept: GENERAL RADIOLOGY | Age: 56
Discharge: HOME | End: 2021-09-10
Attending: STUDENT IN AN ORGANIZED HEALTH CARE EDUCATION/TRAINING PROGRAM
Payer: COMMERCIAL

## 2021-09-10 VITALS
HEIGHT: 65 IN | TEMPERATURE: 98 F | BODY MASS INDEX: 37.99 KG/M2 | WEIGHT: 228 LBS | DIASTOLIC BLOOD PRESSURE: 80 MMHG | SYSTOLIC BLOOD PRESSURE: 129 MMHG | HEART RATE: 79 BPM

## 2021-09-10 DIAGNOSIS — R10.9 LEFT FLANK PAIN: Primary | ICD-10-CM

## 2021-09-10 DIAGNOSIS — Z84.1 FAMILY HISTORY OF NEPHROLITHIASIS: ICD-10-CM

## 2021-09-10 DIAGNOSIS — R10.9 LEFT FLANK PAIN: ICD-10-CM

## 2021-09-10 PROCEDURE — 99213 OFFICE O/P EST LOW 20 MIN: CPT | Performed by: STUDENT IN AN ORGANIZED HEALTH CARE EDUCATION/TRAINING PROGRAM

## 2021-09-10 PROCEDURE — 3008F BODY MASS INDEX DOCD: CPT | Performed by: STUDENT IN AN ORGANIZED HEALTH CARE EDUCATION/TRAINING PROGRAM

## 2021-09-10 PROCEDURE — 74021 RADEX ABDOMEN 3+ VIEWS: CPT | Performed by: STUDENT IN AN ORGANIZED HEALTH CARE EDUCATION/TRAINING PROGRAM

## 2021-09-10 PROCEDURE — 3074F SYST BP LT 130 MM HG: CPT | Performed by: STUDENT IN AN ORGANIZED HEALTH CARE EDUCATION/TRAINING PROGRAM

## 2021-09-10 PROCEDURE — 81002 URINALYSIS NONAUTO W/O SCOPE: CPT | Performed by: STUDENT IN AN ORGANIZED HEALTH CARE EDUCATION/TRAINING PROGRAM

## 2021-09-10 PROCEDURE — 3079F DIAST BP 80-89 MM HG: CPT | Performed by: STUDENT IN AN ORGANIZED HEALTH CARE EDUCATION/TRAINING PROGRAM

## 2021-09-10 RX ORDER — IBUPROFEN 800 MG/1
800 TABLET ORAL EVERY 8 HOURS PRN
Qty: 45 TABLET | Refills: 0 | Status: SHIPPED | OUTPATIENT
Start: 2021-09-10 | End: 2021-09-10

## 2021-09-10 RX ORDER — IBUPROFEN 800 MG/1
800 TABLET ORAL EVERY 8 HOURS PRN
Qty: 30 TABLET | Refills: 0 | Status: SHIPPED | OUTPATIENT
Start: 2021-09-10 | End: 2022-02-05 | Stop reason: ALTCHOICE

## 2021-09-10 NOTE — TELEPHONE ENCOUNTER
Pt called to ask if she should still keep her appointment today or just have physical therapy. States that she still has back pain that makes it hard for her to stand up and she continues to have some urinary symptoms (frequency and pelvic pressure).   Petra Mann

## 2021-09-23 ENCOUNTER — LAB ENCOUNTER (OUTPATIENT)
Dept: LAB | Age: 56
End: 2021-09-23
Attending: INTERNAL MEDICINE
Payer: COMMERCIAL

## 2021-09-23 ENCOUNTER — HOSPITAL ENCOUNTER (OUTPATIENT)
Dept: GENERAL RADIOLOGY | Age: 56
Discharge: HOME | End: 2021-09-23
Attending: INTERNAL MEDICINE
Payer: COMMERCIAL

## 2021-09-23 ENCOUNTER — OFFICE VISIT (OUTPATIENT)
Dept: RHEUMATOLOGY | Facility: CLINIC | Age: 56
End: 2021-09-23
Payer: COMMERCIAL

## 2021-09-23 VITALS
DIASTOLIC BLOOD PRESSURE: 93 MMHG | BODY MASS INDEX: 37.15 KG/M2 | SYSTOLIC BLOOD PRESSURE: 155 MMHG | HEART RATE: 76 BPM | WEIGHT: 223 LBS | RESPIRATION RATE: 16 BRPM | HEIGHT: 65 IN

## 2021-09-23 DIAGNOSIS — M25.50 POLYARTHRALGIA: ICD-10-CM

## 2021-09-23 DIAGNOSIS — M79.10 MYALGIA: ICD-10-CM

## 2021-09-23 DIAGNOSIS — M25.50 POLYARTHRALGIA: Primary | ICD-10-CM

## 2021-09-23 DIAGNOSIS — N18.30 STAGE 3 CHRONIC KIDNEY DISEASE, UNSPECIFIED WHETHER STAGE 3A OR 3B CKD (HCC): ICD-10-CM

## 2021-09-23 LAB
CK SERPL-CCNC: 351 U/L
CREAT UR-SCNC: 142 MG/DL
MICROALBUMIN UR-MCNC: 9.29 MG/DL
MICROALBUMIN/CREAT 24H UR-RTO: 65.4 UG/MG (ref ?–30)

## 2021-09-23 PROCEDURE — 86200 CCP ANTIBODY: CPT

## 2021-09-23 PROCEDURE — 3080F DIAST BP >= 90 MM HG: CPT | Performed by: INTERNAL MEDICINE

## 2021-09-23 PROCEDURE — 86235 NUCLEAR ANTIGEN ANTIBODY: CPT

## 2021-09-23 PROCEDURE — 3061F NEG MICROALBUMINURIA REV: CPT | Performed by: STUDENT IN AN ORGANIZED HEALTH CARE EDUCATION/TRAINING PROGRAM

## 2021-09-23 PROCEDURE — 82550 ASSAY OF CK (CPK): CPT

## 2021-09-23 PROCEDURE — 3060F POS MICROALBUMINURIA REV: CPT | Performed by: STUDENT IN AN ORGANIZED HEALTH CARE EDUCATION/TRAINING PROGRAM

## 2021-09-23 PROCEDURE — 3077F SYST BP >= 140 MM HG: CPT | Performed by: INTERNAL MEDICINE

## 2021-09-23 PROCEDURE — 73120 X-RAY EXAM OF HAND: CPT | Performed by: INTERNAL MEDICINE

## 2021-09-23 PROCEDURE — 99244 OFF/OP CNSLTJ NEW/EST MOD 40: CPT | Performed by: INTERNAL MEDICINE

## 2021-09-23 PROCEDURE — 36415 COLL VENOUS BLD VENIPUNCTURE: CPT

## 2021-09-23 PROCEDURE — 82043 UR ALBUMIN QUANTITATIVE: CPT

## 2021-09-23 PROCEDURE — 82570 ASSAY OF URINE CREATININE: CPT

## 2021-09-23 PROCEDURE — 86812 HLA TYPING A B OR C: CPT

## 2021-09-23 PROCEDURE — 82085 ASSAY OF ALDOLASE: CPT

## 2021-09-23 PROCEDURE — 3008F BODY MASS INDEX DOCD: CPT | Performed by: INTERNAL MEDICINE

## 2021-09-23 RX ORDER — METHYLPREDNISOLONE 4 MG/1
TABLET ORAL
Qty: 1 EACH | Refills: 0 | Status: SHIPPED | OUTPATIENT
Start: 2021-09-23 | End: 2022-02-03 | Stop reason: ALTCHOICE

## 2021-09-23 NOTE — PATIENT INSTRUCTIONS
1. Check labs   2. Check bilateral hand xrays   3. Try Voltaren gel 1 % topical  4. Try medrol travis   5. Return to clinic in 2 weeks.

## 2021-09-23 NOTE — PROGRESS NOTES
Dayana Hays is a 60year old female who presents for Patient presents with:  Consult:  Generalized body aches , onset x2 months  Knee Pain: Left  . HPI:     I had the pleasure of seeing Dayana Hays on 9/23/2021 for evaluation.      She is a pleasant 5 ERGOCALCIFEROL 1.25 MG (27414 UT) Oral Cap TAKE 1 CAPSULE BY MOUTH ONE TIME PER WEEK 12 capsule 3   • Losartan Potassium-HCTZ 100-12.5 MG Oral Tab Take 1 tablet by mouth daily.  90 tablet 3   • amLODIPine Besylate 10 MG Oral Tab Take 1 tablet (10 mg total) dry mouth, no Raynaud's, no nasal ulcers, no parotid swelling, no neck pain, no jaw pain, no temple pain  Eyes: No visual changes,   CVS: No chest pain, no heart disease  RS: No SOB, no Cough, No Pleurtic pain,   GI: No nausea, no vomiiting, no abominal pa Prelim Neutrophil Abs      1.50 - 7.70 x10 (3) uL   2.08   Neutrophils Absolute      1.50 - 7.70 x10(3) uL   2.08   Lymphocytes Absolute      1.00 - 4.00 x10(3) uL   2.56   Monocytes Absolute      0.10 - 1.00 x10(3) uL   0.36   Eosinophils Absolute 15   YENNI SCREEN      Negative   Negative   Vitamin D, 25OH, Total      30.0 - 100.0 ng/mL   59.2   RHEUMATOID FACTOR      <15 IU/mL   <10   HIV Antigen Antibody Combo      Non-Reactive   Non-Reactive     Component      Latest Ref Rng & Units 9/10/2021   Gl

## 2021-09-24 LAB — CCP IGG SERPL-ACNC: 0.7 U/ML (ref 0–6.9)

## 2021-09-25 LAB — ALDOLASE, SERUM: 5.2 U/L

## 2021-09-26 LAB — HLA-B27: NEGATIVE

## 2021-09-29 LAB
ENA SS-A AB SER QL IA: NEGATIVE
ENA SS-B AB SER QL IA: NEGATIVE

## 2021-10-05 NOTE — PROGRESS NOTES
HPI:    Patient ID: Vladimir Peterson is a 60year old female. HPI  Pt presenting with Left flank pain. She reports Left sided back pain that radiates into the groin.  She reports small amount of urine while urinating, with urinary frequency, but denies feve membrane, ear canal and external ear normal.      Left Ear: Tympanic membrane, ear canal and external ear normal.   Eyes:      Extraocular Movements: Extraocular movements intact.       Conjunctiva/sclera: Conjunctivae normal.      Pupils: Pupils are equal, 800 MG Oral Tab; Take 1 tablet (800 mg total) by mouth every 8 (eight) hours as needed for Pain. Dispense: 30 tablet; Refill: 0  - URINE CULTURE, ROUTINE    2. Family history of nephrolithiasis  - XR ABDOMEN, OBSTRUCTIVE SERIES 3 VIEWS(CPT=74021);  Future

## 2021-10-19 DIAGNOSIS — R52 GENERALIZED BODY ACHES: ICD-10-CM

## 2021-10-19 RX ORDER — METHYLPREDNISOLONE 4 MG/1
TABLET ORAL
Qty: 21 EACH | Refills: 0 | OUTPATIENT
Start: 2021-10-19

## 2021-10-19 RX ORDER — CYCLOBENZAPRINE HCL 10 MG
TABLET ORAL
Qty: 30 TABLET | Refills: 0 | Status: SHIPPED | OUTPATIENT
Start: 2021-10-19 | End: 2022-02-05 | Stop reason: ALTCHOICE

## 2021-10-19 NOTE — TELEPHONE ENCOUNTER
Follow up appt scheduled for 10/22/21. Patient going out of town Friday so she wanted to be seen before leaving. Location and parking lot given.

## 2021-10-19 NOTE — TELEPHONE ENCOUNTER
Requested Prescriptions     Pending Prescriptions Disp Refills   • methylPREDNISolone 4 MG Oral Tablet Therapy Pack [Pharmacy Med Name: METHYLPREDNISOLONE 4 MG DOSEPK] 21 each 0     Sig: TAKE 6 TABLETS ON DAY 1 AS DIRECTED ON PACKAGE AND DECREASE BY 1 TAB

## 2021-10-22 ENCOUNTER — OFFICE VISIT (OUTPATIENT)
Dept: RHEUMATOLOGY | Facility: CLINIC | Age: 56
End: 2021-10-22
Payer: COMMERCIAL

## 2021-10-22 VITALS
DIASTOLIC BLOOD PRESSURE: 93 MMHG | SYSTOLIC BLOOD PRESSURE: 158 MMHG | HEIGHT: 65 IN | HEART RATE: 66 BPM | WEIGHT: 222 LBS | RESPIRATION RATE: 16 BRPM | BODY MASS INDEX: 36.99 KG/M2

## 2021-10-22 DIAGNOSIS — R74.8 ELEVATED CK: Primary | ICD-10-CM

## 2021-10-22 DIAGNOSIS — M18.0 OSTEOARTHRITIS OF THUMBS, BILATERAL: ICD-10-CM

## 2021-10-22 DIAGNOSIS — M79.10 MYALGIA: ICD-10-CM

## 2021-10-22 DIAGNOSIS — M76.32 CHRONIC ILIOTIBIAL BAND SYNDROME OF LEFT SIDE: ICD-10-CM

## 2021-10-22 PROCEDURE — 3080F DIAST BP >= 90 MM HG: CPT | Performed by: INTERNAL MEDICINE

## 2021-10-22 PROCEDURE — 99214 OFFICE O/P EST MOD 30 MIN: CPT | Performed by: INTERNAL MEDICINE

## 2021-10-22 PROCEDURE — 3008F BODY MASS INDEX DOCD: CPT | Performed by: INTERNAL MEDICINE

## 2021-10-22 PROCEDURE — 3077F SYST BP >= 140 MM HG: CPT | Performed by: INTERNAL MEDICINE

## 2021-10-22 RX ORDER — METHYLPREDNISOLONE 4 MG/1
TABLET ORAL
Qty: 1 EACH | Refills: 0 | Status: SHIPPED | OUTPATIENT
Start: 2021-10-22 | End: 2022-02-03 | Stop reason: ALTCHOICE

## 2021-10-22 NOTE — PROGRESS NOTES
Brandon Olvera is a 60year old female who presents for Patient presents with:  Lab Results  Medication Follow-Up  Hand Pain: Right  Leg Pain: Left, Thigh  . HPI:     I had the pleasure of seeing Brandon Olvera on 9/23/2021 for evaluation.      She is a ple kg)    Body mass index is 36.94 kg/m².       Current Outpatient Medications   Medication Sig Dispense Refill   • CYCLOBENZAPRINE 10 MG Oral Tab TAKE 1 TABLET BY MOUTH EVERY DAY IN THE EVENING AS NEEDED FOR MUSCLE SPASMS 30 tablet 0   • ibuprofen 800 MG Oral 1 sisters  - no arthritis     1 brother passed - had alotof pain and was taking ibuprofen.     Social History:  Social History    Tobacco Use      Smoking status: Former Smoker        Packs/day: 1.00        Years: 13.00        Pack years: 13      Smokele in hands   Tender in shoulders - able to raise her arms easily   Tender in knees, no swelling  Very tender in left trochaneric bursitis and left iliotibial band syndrome , left lateral side of kene   Mild tender in right thgih -   Not tender in hips,   Mil (H)   eGFR NON-AFR.  AMERICAN      >=60   65   eGFR       >=60   75   ALT (SGPT)      13 - 56 U/L   30   AST (SGOT)      15 - 37 U/L   19   ALKALINE PHOSPHATASE      46 - 118 U/L   73   Total Bilirubin      0.1 - 2.0 mg/dL   0.5   TOTAL PROT C-Citrullinated Peptide IgG AB      0.0 - 6.9 U/mL 0.7   CK      26 - 192 U/L 351 (H)   ALDOLASE, SERUM      1.5 - 8.1 U/L 5.2   HLA-B27      Negative Negative   Anti-Sjogren's A      Negative Negative     8/28/2021 - left knee xray   1.  Mild tricompartm

## 2021-10-22 NOTE — PATIENT INSTRUCTIONS
Summary:  1. Try medrol travis   2. Physical therapy for left iliotibial band -   3. Try Voltaren gel 1 % topical  4. Stop the statin cholesterol medication in 1 month and see how you feelt   5.  Return to clinic in 4 months as needed   Treatment for Iliotibia if you have any of these:  · Symptoms that get worse, or don’t get better with treatment  · New symptoms  Antonina last reviewed this educational content on 5/1/2018  © 0498-5212 [facility] Ola [employer]. All rights reserved.  This information is not inten outside of the knee or side of the thigh. It might affect one or both of your knees. The pain is an aching, burning feeling that can spread up the thigh to the hip. You may feel this pain only when you exercise, such as while running.  The pain may be worst

## 2021-10-24 DIAGNOSIS — I10 ESSENTIAL HYPERTENSION: ICD-10-CM

## 2021-10-25 RX ORDER — AMLODIPINE BESYLATE 10 MG/1
TABLET ORAL
Qty: 90 TABLET | Refills: 3 | Status: SHIPPED | OUTPATIENT
Start: 2021-10-25

## 2021-10-31 DIAGNOSIS — I10 ESSENTIAL HYPERTENSION: ICD-10-CM

## 2021-10-31 RX ORDER — LOSARTAN POTASSIUM AND HYDROCHLOROTHIAZIDE 12.5; 1 MG/1; MG/1
1 TABLET ORAL DAILY
Qty: 90 TABLET | Refills: 1 | Status: SHIPPED | OUTPATIENT
Start: 2021-10-31 | End: 2022-04-30

## 2021-10-31 NOTE — TELEPHONE ENCOUNTER
Refill passed per [employer], [facility] protocol.     Requested Prescriptions   Pending Prescriptions Disp Refills    LOSARTAN POTASSIUM-HCTZ 100-12.5 MG Oral Tab [Pharmacy Med Name: [place] 100-12.5 MG TAB] 90 tablet 3     Sig: TAKE 1 TABLET BY MOUTH EVERY DAY        Hypertensive Medications Protocol Passed - 10/31/2021 11:23 AM        Passed - CMP or BMP in past 12 months        Passed - Appointment in past 6 or next 3 months        Passed - GFR  > 50     Lab Results   Component Value Date    GFRAA 75 06/05/2021                       Recent Outpatient Visits              1 week ago Elevated CK    [facility] for Alvin Alvarez MD    Office Visit    1 month ago Mario Gonzalez MD    Office Visit    1 month ago Left flank pain    David Joseph MD    Office Visit    2 months ago Generalized body aches    Elma Craft MD    Office Visit    5 months ago [facility] [address], Zoila Nava MD    Office Visit

## 2021-11-29 DIAGNOSIS — E78.00 HYPERCHOLESTEREMIA: ICD-10-CM

## 2021-11-29 DIAGNOSIS — E87.6 HYPOKALEMIA: ICD-10-CM

## 2021-11-29 RX ORDER — ATORVASTATIN CALCIUM 20 MG/1
TABLET, FILM COATED ORAL
Qty: 90 TABLET | Refills: 1 | Status: SHIPPED | OUTPATIENT
Start: 2021-11-29 | End: 2022-02-05

## 2021-11-29 RX ORDER — ERGOCALCIFEROL 1.25 MG/1
CAPSULE ORAL
Qty: 12 CAPSULE | Refills: 3 | Status: SHIPPED | OUTPATIENT
Start: 2021-11-29

## 2021-11-29 RX ORDER — POTASSIUM CHLORIDE 1500 MG/1
TABLET, FILM COATED, EXTENDED RELEASE ORAL
Qty: 90 TABLET | Refills: 1 | Status: SHIPPED | OUTPATIENT
Start: 2021-11-29

## 2021-11-29 NOTE — TELEPHONE ENCOUNTER
Refill passed per [facility] protocol.    Requested Prescriptions   Pending Prescriptions Disp Refills    POTASSIUM CHLORIDE ER 20 MEQ Oral Tab CR [Pharmacy Med Name: POTASSIUM CL ER 20 MEQ TABLET] 90 tablet 1     Sig: TAKE 1 TABLET BY MOUTH EVERY DAY

## 2021-11-29 NOTE — TELEPHONE ENCOUNTER
Please review. Protocol failed or has no protocol.      Requested Prescriptions   Pending Prescriptions Disp Refills    POTASSIUM CHLORIDE ER 20 MEQ Oral Tab CR [Pharmacy Med Name: POTASSIUM CL ER 20 MEQ TABLET] 90 tablet 1     Sig: TAKE 1 TABLET BY MOUTH E

## 2022-01-17 ENCOUNTER — TELEPHONE (OUTPATIENT)
Dept: FAMILY MEDICINE CLINIC | Facility: CLINIC | Age: 57
End: 2022-01-17

## 2022-01-17 DIAGNOSIS — Z12.31 ENCOUNTER FOR SCREENING MAMMOGRAM FOR BREAST CANCER: Primary | ICD-10-CM

## 2022-01-17 NOTE — TELEPHONE ENCOUNTER
Pt states that she is past due for her yearly/routine mammogram. Please, would like to know if the doctor can give her an order for one. Please, call pt when the order is in the system.

## 2022-01-17 NOTE — TELEPHONE ENCOUNTER
Please advise on message below. Patient is coming in for her physical 02/05/22. Would like to get mammogram done beforehand. Order pended, please sign if appropriate.

## 2022-01-24 ENCOUNTER — TELEPHONE (OUTPATIENT)
Dept: FAMILY MEDICINE CLINIC | Facility: CLINIC | Age: 57
End: 2022-01-24

## 2022-01-24 NOTE — TELEPHONE ENCOUNTER
Patient requesting to speak with nurse regarding questions about covid vaccine. Please call at [phone].   *Provided scheduling information on line

## 2022-01-29 ENCOUNTER — IMMUNIZATION (OUTPATIENT)
Dept: LAB | Facility: HOSPITAL | Age: 57
End: 2022-01-29
Attending: EMERGENCY MEDICINE
Payer: COMMERCIAL

## 2022-01-29 DIAGNOSIS — Z23 NEED FOR VACCINATION: Primary | ICD-10-CM

## 2022-01-29 PROCEDURE — 0054A SARSCOV2 VAC 30MCG TRS SUCR: CPT

## 2022-01-31 ENCOUNTER — LAB ENCOUNTER (OUTPATIENT)
Dept: LAB | Age: 57
End: 2022-01-31
Attending: NURSE PRACTITIONER
Payer: COMMERCIAL

## 2022-01-31 ENCOUNTER — OFFICE VISIT (OUTPATIENT)
Dept: INTERNAL MEDICINE CLINIC | Facility: CLINIC | Age: 57
End: 2022-01-31
Payer: COMMERCIAL

## 2022-01-31 ENCOUNTER — HOSPITAL ENCOUNTER (OUTPATIENT)
Dept: GENERAL RADIOLOGY | Age: 57
Discharge: HOME | End: 2022-01-31
Attending: NURSE PRACTITIONER
Payer: COMMERCIAL

## 2022-01-31 ENCOUNTER — NURSE TRIAGE (OUTPATIENT)
Dept: FAMILY MEDICINE CLINIC | Facility: CLINIC | Age: 57
End: 2022-01-31

## 2022-01-31 VITALS
WEIGHT: 226 LBS | SYSTOLIC BLOOD PRESSURE: 152 MMHG | DIASTOLIC BLOOD PRESSURE: 97 MMHG | HEIGHT: 65 IN | RESPIRATION RATE: 16 BRPM | BODY MASS INDEX: 37.65 KG/M2 | HEART RATE: 76 BPM

## 2022-01-31 DIAGNOSIS — M25.572 ACUTE LEFT ANKLE PAIN: Primary | ICD-10-CM

## 2022-01-31 DIAGNOSIS — M25.572 ACUTE LEFT ANKLE PAIN: ICD-10-CM

## 2022-01-31 LAB — URATE SERPL-MCNC: 4.2 MG/DL

## 2022-01-31 PROCEDURE — 3080F DIAST BP >= 90 MM HG: CPT | Performed by: NURSE PRACTITIONER

## 2022-01-31 PROCEDURE — 36415 COLL VENOUS BLD VENIPUNCTURE: CPT

## 2022-01-31 PROCEDURE — 84550 ASSAY OF BLOOD/URIC ACID: CPT

## 2022-01-31 PROCEDURE — 73610 X-RAY EXAM OF ANKLE: CPT | Performed by: NURSE PRACTITIONER

## 2022-01-31 PROCEDURE — 3008F BODY MASS INDEX DOCD: CPT | Performed by: NURSE PRACTITIONER

## 2022-01-31 PROCEDURE — 99213 OFFICE O/P EST LOW 20 MIN: CPT | Performed by: NURSE PRACTITIONER

## 2022-01-31 PROCEDURE — 3077F SYST BP >= 140 MM HG: CPT | Performed by: NURSE PRACTITIONER

## 2022-01-31 RX ORDER — PREDNISONE 20 MG/1
TABLET ORAL
Qty: 10 TABLET | Refills: 0 | Status: SHIPPED | OUTPATIENT
Start: 2022-01-31 | End: 2022-02-05 | Stop reason: ALTCHOICE

## 2022-01-31 NOTE — PATIENT INSTRUCTIONS
ACE Wrap  Minor muscle or joint injuries are often treated with an elastic bandage. The bandage provides support and compression to the injured area. An elastic bandage is a stretchy, rolled bandage. Elastic bandages range in width from 2 to 6 inches. time.    Follow-up care  Follow up with your healthcare provider, or as advised.   When to seek medical advice  Call your healthcare provider for any of the following:  · Pain and swelling that doesn't get better or gets worse  · Trouble moving injured area raised or elevated above the level of your heart reduces swelling, pain, and throbbing. For instance, if you have a broken leg, it may help to rest your leg on several pillows when sitting or lying down.  Try to keep the injured area elevated as often as po

## 2022-01-31 NOTE — PROGRESS NOTES
Julio Geronimo is a 60year old female. Patient presents with:  Pain: left ankle no injury    HPI:   Patient presents for left ankle pain and swelling. Symptoms started Thursday and she had to leave work on Friday.  She is taking tylenol and ibuprofen with li History:   Procedure Laterality Date   • COLONOSCOPY N/A 2018    Procedure: COLONOSCOPY;  Surgeon: Aries Go MD;  Location: [address] [place] [address] ENDOSCOPY   • ENDOMETRIAL ABLATION      child passed away for 5 mins   •       1   • OTHER SURGICAL HIST Pulses:           Dorsalis pedis pulses are 1+ on the left side. Musculoskeletal:      Left ankle: Swelling present. Tenderness present. Decreased range of motion. Feet:      Left foot:      Skin integrity: Skin integrity normal. No erythema.    Sk

## 2022-01-31 NOTE — TELEPHONE ENCOUNTER
Action Requested: Summary for Provider     []  Critical Lab, Recommendations Needed  [] Need Additional Advice  []   FYI    []   Need Orders  [] Need Medications Sent to Pharmacy  []  Other     SUMMARY: pt states that she has had left ankle pain for [facility]

## 2022-02-03 ENCOUNTER — OFFICE VISIT (OUTPATIENT)
Dept: PODIATRY CLINIC | Facility: CLINIC | Age: 57
End: 2022-02-03
Payer: COMMERCIAL

## 2022-02-03 DIAGNOSIS — M76.822 TIBIALIS POSTERIOR TENDINITIS, LEFT: Primary | ICD-10-CM

## 2022-02-03 PROCEDURE — 99243 OFF/OP CNSLTJ NEW/EST LOW 30: CPT | Performed by: PODIATRIST

## 2022-02-03 RX ORDER — IBUPROFEN 600 MG/1
600 TABLET ORAL EVERY 8 HOURS PRN
Qty: 90 TABLET | Refills: 1 | Status: SHIPPED | OUTPATIENT
Start: 2022-02-03 | End: 2022-02-05

## 2022-02-05 ENCOUNTER — OFFICE VISIT (OUTPATIENT)
Dept: FAMILY MEDICINE CLINIC | Facility: CLINIC | Age: 57
End: 2022-02-05
Payer: COMMERCIAL

## 2022-02-05 ENCOUNTER — HOSPITAL ENCOUNTER (OUTPATIENT)
Dept: MAMMOGRAPHY | Age: 57
Discharge: HOME | End: 2022-02-05
Attending: FAMILY MEDICINE
Payer: COMMERCIAL

## 2022-02-05 VITALS
WEIGHT: 224 LBS | DIASTOLIC BLOOD PRESSURE: 74 MMHG | HEIGHT: 65 IN | BODY MASS INDEX: 37.32 KG/M2 | SYSTOLIC BLOOD PRESSURE: 119 MMHG | TEMPERATURE: 97 F | HEART RATE: 71 BPM

## 2022-02-05 DIAGNOSIS — E78.00 HYPERCHOLESTEREMIA: ICD-10-CM

## 2022-02-05 DIAGNOSIS — E55.9 VITAMIN D DEFICIENCY: ICD-10-CM

## 2022-02-05 DIAGNOSIS — Z23 NEED FOR SHINGLES VACCINE: ICD-10-CM

## 2022-02-05 DIAGNOSIS — E11.9 CONTROLLED TYPE 2 DIABETES MELLITUS WITHOUT COMPLICATION, WITHOUT LONG-TERM CURRENT USE OF INSULIN (HCC): ICD-10-CM

## 2022-02-05 DIAGNOSIS — Z12.11 COLON CANCER SCREENING: ICD-10-CM

## 2022-02-05 DIAGNOSIS — E66.9 OBESITY (BMI 30-39.9): ICD-10-CM

## 2022-02-05 DIAGNOSIS — R42 DIZZINESS: ICD-10-CM

## 2022-02-05 DIAGNOSIS — Z12.31 ENCOUNTER FOR SCREENING MAMMOGRAM FOR BREAST CANCER: ICD-10-CM

## 2022-02-05 DIAGNOSIS — Z01.419 ENCOUNTER FOR GYNECOLOGICAL EXAMINATION: ICD-10-CM

## 2022-02-05 DIAGNOSIS — I10 ESSENTIAL HYPERTENSION: ICD-10-CM

## 2022-02-05 DIAGNOSIS — Z00.00 WELL ADULT EXAM: Primary | ICD-10-CM

## 2022-02-05 PROCEDURE — 3078F DIAST BP <80 MM HG: CPT | Performed by: FAMILY MEDICINE

## 2022-02-05 PROCEDURE — 3008F BODY MASS INDEX DOCD: CPT | Performed by: FAMILY MEDICINE

## 2022-02-05 PROCEDURE — 3074F SYST BP LT 130 MM HG: CPT | Performed by: FAMILY MEDICINE

## 2022-02-05 PROCEDURE — 99396 PREV VISIT EST AGE 40-64: CPT | Performed by: FAMILY MEDICINE

## 2022-02-07 ENCOUNTER — TELEPHONE (OUTPATIENT)
Dept: GASTROENTEROLOGY | Facility: CLINIC | Age: 57
End: 2022-02-07

## 2022-02-07 RX ORDER — POLYETHYLENE GLYCOL 3350, SODIUM CHLORIDE, SODIUM BICARBONATE, POTASSIUM CHLORIDE 420; 11.2; 5.72; 1.48 G/4L; G/4L; G/4L; G/4L
POWDER, FOR SOLUTION ORAL
Qty: 4000 ML | Refills: 0 | Status: SHIPPED | OUTPATIENT
Start: 2022-02-07

## 2022-02-07 NOTE — TELEPHONE ENCOUNTER
The patient's chart has been reviewed. Okay to schedule pt for 3 year CLN recall r/t hx colon polyps with Dr. Haydee Guzman. Advise MAC sedation r/t anticipated high sedation requirements with split dose Colyte/TriLyte or equivalent(eRx) preparation.   -Eligible for NE: No r/t medical hx  -Denies history of bleeding/clotting disorders.      -Please note: It is the patient's responsibility to contact his/her insurance company regarding questions about out-of-pocket cost/benefits. Please provide the appropriate diagnostic information/codes.      May continue all medications listed in the med module except:  -Anti-platelets and anti-coagulants: ASA - continue as prescribed  -Diabetes meds: none    ** If MAC:    - HOLD losartan the night before and/or the day of the procedure(s)     - NO alcohol, recreational drugs nor erectile dysfunction medications 24 hours before procedure(s)   - NO herbal supplements or weight loss medications (phentermine/Vyvanse/Adderall) x 7 days prior to the procedure(s)    ** If MAC @ [facility] or IV twilit - continue all medications as prescribed    ** COVID-19 testing required 72 hours prior to procedure

## 2022-02-09 NOTE — TELEPHONE ENCOUNTER
CBLM to schedule procedure. Please transfer to Kiran Garcia at ext [phone] for scheduling. no history of blood product transfusion

## 2022-02-16 NOTE — TELEPHONE ENCOUNTER
I spoke with the patient and she is now scheduled. Scheduled for:  Colonoscopy [address]  Provider Name:  Dr Tanisha Bass   Date:  Tuesday, 07/12/2022  Location:  [facility]  Sedation:  MAC  Time:  9:00am( pt is aware arrival time is at 8:00am)  Prep:  Trilyte  Meds/Allergies Reconciled?:  Yes  Diagnosis with codes:  Hx of Colon Polyps Z86.010  Was patient informed to call insurance with codes (Y/N):  Yes  Referral sent?:  Referral was sent at the time of electronic surgical scheduling. [facility] or [id number] notified?:  I sent an electronic request to Endo Scheduling and received a confirmation today. Medication Orders:  HOLD losartan the day of/ day before the procedure. Pt is aware to NOT take iron pills, herbal meds and diet supplements for 7 days before exam. Also to NOT take any form of alcohol, recreational drugs and any forms of ED meds 24 hours before exam.   Misc Orders:  Patient was informed that they will need a COVID 19 test prior to their procedure. Patient verbally understood & will await a phone call from [facility] to schedule. Further instructions given by staff: I provide prep instructions to patient via [employer] ad mail and reviewed date, time and location, she verbalized that she understood and is aware to call if she has any questions.

## 2022-02-21 ENCOUNTER — EKG ENCOUNTER (OUTPATIENT)
Dept: LAB | Age: 57
End: 2022-02-21
Attending: FAMILY MEDICINE
Payer: COMMERCIAL

## 2022-02-21 ENCOUNTER — LAB ENCOUNTER (OUTPATIENT)
Dept: LAB | Age: 57
End: 2022-02-21
Attending: FAMILY MEDICINE
Payer: COMMERCIAL

## 2022-02-21 DIAGNOSIS — R42 DIZZINESS: ICD-10-CM

## 2022-02-21 DIAGNOSIS — E11.9 CONTROLLED TYPE 2 DIABETES MELLITUS WITHOUT COMPLICATION, WITHOUT LONG-TERM CURRENT USE OF INSULIN (HCC): ICD-10-CM

## 2022-02-21 DIAGNOSIS — Z00.00 WELL ADULT EXAM: ICD-10-CM

## 2022-02-21 LAB
ALBUMIN SERPL-MCNC: 3.6 G/DL (ref 3.4–5)
ALBUMIN/GLOB SERPL: 1.3 {RATIO} (ref 1–2)
ALP LIVER SERPL-CCNC: 63 U/L
ALT SERPL-CCNC: 22 U/L
ANION GAP SERPL CALC-SCNC: 7 MMOL/L (ref 0–18)
AST SERPL-CCNC: 10 U/L (ref 15–37)
BASOPHILS # BLD AUTO: 0.01 X10(3) UL (ref 0–0.2)
BASOPHILS NFR BLD AUTO: 0.2 %
BILIRUB SERPL-MCNC: 0.5 MG/DL (ref 0.1–2)
BUN BLD-MCNC: 23 MG/DL (ref 7–18)
BUN/CREAT SERPL: 25.8 (ref 10–20)
CALCIUM BLD-MCNC: 8.8 MG/DL (ref 8.5–10.1)
CHLORIDE SERPL-SCNC: 108 MMOL/L (ref 98–112)
CHOLEST SERPL-MCNC: 202 MG/DL (ref ?–200)
CO2 SERPL-SCNC: 26 MMOL/L (ref 21–32)
CREAT BLD-MCNC: 0.89 MG/DL
DEPRECATED RDW RBC AUTO: 48.9 FL (ref 35.1–46.3)
EOSINOPHIL # BLD AUTO: 0.24 X10(3) UL (ref 0–0.7)
EOSINOPHIL NFR BLD AUTO: 4.1 %
ERYTHROCYTE [DISTWIDTH] IN BLOOD BY AUTOMATED COUNT: 15.7 % (ref 11–15)
EST. AVERAGE GLUCOSE BLD GHB EST-MCNC: 123 MG/DL (ref 68–126)
FASTING PATIENT LIPID ANSWER: YES
FASTING STATUS PATIENT QL REPORTED: YES
GLOBULIN PLAS-MCNC: 2.8 G/DL (ref 2.8–4.4)
GLUCOSE BLD-MCNC: 82 MG/DL (ref 70–99)
HBA1C MFR BLD: 5.9 % (ref ?–5.7)
HCT VFR BLD AUTO: 40.5 %
HDLC SERPL-MCNC: 56 MG/DL (ref 40–59)
HGB BLD-MCNC: 12.7 G/DL
IMM GRANULOCYTES # BLD AUTO: 0.02 X10(3) UL (ref 0–1)
IMM GRANULOCYTES NFR BLD: 0.3 %
LDLC SERPL CALC-MCNC: 137 MG/DL (ref ?–100)
LYMPHOCYTES # BLD AUTO: 2.22 X10(3) UL (ref 1–4)
LYMPHOCYTES NFR BLD AUTO: 37.9 %
MCH RBC QN AUTO: 27.1 PG (ref 26–34)
MCHC RBC AUTO-ENTMCNC: 31.4 G/DL (ref 31–37)
MCV RBC AUTO: 86.4 FL
MONOCYTES # BLD AUTO: 0.44 X10(3) UL (ref 0.1–1)
MONOCYTES NFR BLD AUTO: 7.5 %
NEUTROPHILS # BLD AUTO: 2.92 X10 (3) UL (ref 1.5–7.7)
NEUTROPHILS # BLD AUTO: 2.92 X10(3) UL (ref 1.5–7.7)
NEUTROPHILS NFR BLD AUTO: 50 %
NONHDLC SERPL-MCNC: 146 MG/DL (ref ?–130)
OSMOLALITY SERPL CALC.SUM OF ELEC: 295 MOSM/KG (ref 275–295)
PLATELET # BLD AUTO: 210 10(3)UL (ref 150–450)
POTASSIUM SERPL-SCNC: 3.9 MMOL/L (ref 3.5–5.1)
PROT SERPL-MCNC: 6.4 G/DL (ref 6.4–8.2)
RBC # BLD AUTO: 4.69 X10(6)UL
SODIUM SERPL-SCNC: 141 MMOL/L (ref 136–145)
TRIGL SERPL-MCNC: 50 MG/DL (ref 30–149)
TSI SER-ACNC: 0.4 MIU/ML (ref 0.36–3.74)
VLDLC SERPL CALC-MCNC: 9 MG/DL (ref 0–30)
WBC # BLD AUTO: 5.9 X10(3) UL (ref 4–11)

## 2022-02-21 PROCEDURE — 36415 COLL VENOUS BLD VENIPUNCTURE: CPT

## 2022-02-21 PROCEDURE — 93005 ELECTROCARDIOGRAM TRACING: CPT

## 2022-02-21 PROCEDURE — 85025 COMPLETE CBC W/AUTO DIFF WBC: CPT

## 2022-02-21 PROCEDURE — 83036 HEMOGLOBIN GLYCOSYLATED A1C: CPT

## 2022-02-21 PROCEDURE — 93010 ELECTROCARDIOGRAM REPORT: CPT | Performed by: FAMILY MEDICINE

## 2022-02-21 PROCEDURE — 80053 COMPREHEN METABOLIC PANEL: CPT

## 2022-02-21 PROCEDURE — 80061 LIPID PANEL: CPT

## 2022-02-21 PROCEDURE — 84443 ASSAY THYROID STIM HORMONE: CPT

## 2022-02-26 RX ORDER — PRAVASTATIN SODIUM 20 MG
20 TABLET ORAL NIGHTLY
Qty: 90 TABLET | Refills: 0 | Status: SHIPPED | OUTPATIENT
Start: 2022-02-26

## 2022-03-26 ENCOUNTER — HOSPITAL ENCOUNTER (OUTPATIENT)
Dept: MAMMOGRAPHY | Age: 57
Discharge: HOME | End: 2022-03-26
Attending: FAMILY MEDICINE
Payer: COMMERCIAL

## 2022-03-26 ENCOUNTER — HOSPITAL ENCOUNTER (OUTPATIENT)
Dept: BONE DENSITY | Age: 57
Discharge: HOME | End: 2022-03-26
Attending: NURSE PRACTITIONER
Payer: COMMERCIAL

## 2022-03-26 DIAGNOSIS — Z78.0 POST-MENOPAUSAL: ICD-10-CM

## 2022-03-26 PROCEDURE — 77080 DXA BONE DENSITY AXIAL: CPT | Performed by: NURSE PRACTITIONER

## 2022-03-26 PROCEDURE — 77063 BREAST TOMOSYNTHESIS BI: CPT | Performed by: FAMILY MEDICINE

## 2022-03-26 PROCEDURE — 77067 SCR MAMMO BI INCL CAD: CPT | Performed by: FAMILY MEDICINE

## 2022-04-04 ENCOUNTER — HOSPITAL ENCOUNTER (OUTPATIENT)
Age: 57
Discharge: HOME | End: 2022-04-04
Payer: COMMERCIAL

## 2022-04-04 ENCOUNTER — NURSE TRIAGE (OUTPATIENT)
Dept: FAMILY MEDICINE CLINIC | Facility: CLINIC | Age: 57
End: 2022-04-04

## 2022-04-04 VITALS
HEART RATE: 76 BPM | HEIGHT: 65 IN | TEMPERATURE: 98 F | WEIGHT: 222 LBS | OXYGEN SATURATION: 100 % | RESPIRATION RATE: 18 BRPM | BODY MASS INDEX: 36.99 KG/M2 | SYSTOLIC BLOOD PRESSURE: 171 MMHG | DIASTOLIC BLOOD PRESSURE: 93 MMHG

## 2022-04-04 DIAGNOSIS — H65.193 ACUTE MEE (MIDDLE EAR EFFUSION), BILATERAL: ICD-10-CM

## 2022-04-04 DIAGNOSIS — I10 HYPERTENSION, UNSPECIFIED TYPE: ICD-10-CM

## 2022-04-04 DIAGNOSIS — J01.90 ACUTE NON-RECURRENT SINUSITIS, UNSPECIFIED LOCATION: Primary | ICD-10-CM

## 2022-04-04 DIAGNOSIS — Z20.822 ENCOUNTER FOR LABORATORY TESTING FOR COVID-19 VIRUS: ICD-10-CM

## 2022-04-04 LAB — SARS-COV-2 RNA RESP QL NAA+PROBE: NOT DETECTED

## 2022-04-04 PROCEDURE — 99213 OFFICE O/P EST LOW 20 MIN: CPT | Performed by: EMERGENCY MEDICINE

## 2022-04-04 PROCEDURE — U0002 COVID-19 LAB TEST NON-CDC: HCPCS | Performed by: EMERGENCY MEDICINE

## 2022-04-04 NOTE — ED INITIAL ASSESSMENT (HPI)
Pt presents to the IC with c/o sinus congestion, cough, post nasal drip and chest congestion for the last 5 days. No fevers or known sick contacts.

## 2022-04-30 RX ORDER — LOSARTAN POTASSIUM AND HYDROCHLOROTHIAZIDE 12.5; 1 MG/1; MG/1
TABLET ORAL
Qty: 90 TABLET | Refills: 1 | Status: SHIPPED | OUTPATIENT
Start: 2022-04-30

## 2022-05-11 ENCOUNTER — NURSE TRIAGE (OUTPATIENT)
Dept: FAMILY MEDICINE CLINIC | Facility: CLINIC | Age: 57
End: 2022-05-11

## 2022-05-13 ENCOUNTER — HOSPITAL ENCOUNTER (OUTPATIENT)
Age: 57
Discharge: HOME | End: 2022-05-13
Payer: COMMERCIAL

## 2022-05-13 VITALS
SYSTOLIC BLOOD PRESSURE: 158 MMHG | TEMPERATURE: 99 F | WEIGHT: 222 LBS | DIASTOLIC BLOOD PRESSURE: 98 MMHG | BODY MASS INDEX: 36.99 KG/M2 | HEART RATE: 76 BPM | RESPIRATION RATE: 16 BRPM | HEIGHT: 65 IN | OXYGEN SATURATION: 99 %

## 2022-05-13 DIAGNOSIS — R03.0 ELEVATED BLOOD PRESSURE READING: ICD-10-CM

## 2022-05-13 DIAGNOSIS — J01.41 ACUTE RECURRENT PANSINUSITIS: Primary | ICD-10-CM

## 2022-05-13 LAB — SARS-COV-2 RNA RESP QL NAA+PROBE: NOT DETECTED

## 2022-05-13 PROCEDURE — 99213 OFFICE O/P EST LOW 20 MIN: CPT | Performed by: NURSE PRACTITIONER

## 2022-05-13 PROCEDURE — U0002 COVID-19 LAB TEST NON-CDC: HCPCS | Performed by: NURSE PRACTITIONER

## 2022-05-13 RX ORDER — AMOXICILLIN AND CLAVULANATE POTASSIUM 875; 125 MG/1; MG/1
1 TABLET, FILM COATED ORAL 2 TIMES DAILY
Qty: 20 TABLET | Refills: 0 | Status: SHIPPED | OUTPATIENT
Start: 2022-05-13 | End: 2022-05-23

## 2022-05-13 NOTE — ED INITIAL ASSESSMENT (HPI)
Pt c/o nasal congestion and sinus pressure, which started on May 4th. Denies fevers.  Reports nonproductive cough

## 2022-05-27 DIAGNOSIS — E78.00 HYPERCHOLESTEREMIA: ICD-10-CM

## 2022-05-27 NOTE — TELEPHONE ENCOUNTER
Please review; protocol failed/no protocol    Requested Prescriptions   Pending Prescriptions Disp Refills    PRAVASTATIN 20 MG Oral Tab [Pharmacy Med Name: PRAVASTATIN SODIUM 20 MG TAB] 90 tablet 0     Sig: TAKE 1 TABLET BY MOUTH EVERY DAY AT NIGHT        Cholesterol Medication Protocol Failed - 5/27/2022 12:00 AM        Failed - Last LDL < 130     Lab Results   Component Value Date     (H) 02/21/2022               Passed - ALT in past 12 months        Passed - LDL in past 12 months        Passed - Last ALT < 80       Lab Results   Component Value Date    ALT 22 02/21/2022             Passed - Appointment in past 12 or next 3 months               Recent Outpatient Visits              1 month ago     [facility] Provider    E-Visit    1 month ago Acute sinusitis, recurrence not specified, unspecified location    [facility] Virtual Visit Efrain Kenny NP    E-Visit    3 months ago Well adult exam    Loretta Stewart MD    Office Visit    3 months ago Tibialis posterior tendinitis, left    [address] [facility].  [facility], [place], Loyde CHAU Galvez    Office Visit    3 months ago Acute left ankle pain    [address] [facility], [facility] Zhanna Rondon [place], KATHERINE    Office Visit             Future Appointments         Provider Department Appt Notes    In 1 month STATHOPOULOS, PROCEDURE [address] GI PROCEDURE Colon w/ MAC @ [address] [place] [address]

## 2022-05-28 RX ORDER — PRAVASTATIN SODIUM 20 MG
20 TABLET ORAL NIGHTLY
Qty: 90 TABLET | Refills: 0 | Status: SHIPPED | OUTPATIENT
Start: 2022-05-28 | End: 2022-07-05

## 2022-05-31 NOTE — TELEPHONE ENCOUNTER
1st attempt - [employer] message sent for patient to contact the office to schedule an appointment; see notes below.

## 2022-06-16 DIAGNOSIS — E78.00 HYPERCHOLESTEREMIA: ICD-10-CM

## 2022-06-18 RX ORDER — ATORVASTATIN CALCIUM 20 MG/1
TABLET, FILM COATED ORAL
Qty: 90 TABLET | Refills: 1 | OUTPATIENT
Start: 2022-06-18

## 2022-06-19 DIAGNOSIS — E87.6 HYPOKALEMIA: ICD-10-CM

## 2022-06-20 RX ORDER — POTASSIUM CHLORIDE 1500 MG/1
TABLET, FILM COATED, EXTENDED RELEASE ORAL
Qty: 90 TABLET | Refills: 1 | Status: SHIPPED | OUTPATIENT
Start: 2022-06-20

## 2022-07-03 DIAGNOSIS — I10 ESSENTIAL HYPERTENSION: ICD-10-CM

## 2022-07-03 DIAGNOSIS — E78.00 HYPERCHOLESTEREMIA: ICD-10-CM

## 2022-07-05 RX ORDER — METOPROLOL SUCCINATE 25 MG/1
TABLET, EXTENDED RELEASE ORAL
Qty: 90 TABLET | Refills: 3 | Status: SHIPPED | OUTPATIENT
Start: 2022-07-05

## 2022-07-05 RX ORDER — ATORVASTATIN CALCIUM 20 MG/1
TABLET, FILM COATED ORAL
Qty: 90 TABLET | Refills: 1 | Status: SHIPPED | OUTPATIENT
Start: 2022-07-05

## 2022-07-05 RX ORDER — PRAVASTATIN SODIUM 20 MG
TABLET ORAL
Qty: 90 TABLET | Refills: 0 | Status: SHIPPED | OUTPATIENT
Start: 2022-07-05

## 2022-07-06 ENCOUNTER — TELEPHONE (OUTPATIENT)
Dept: FAMILY MEDICINE CLINIC | Facility: CLINIC | Age: 57
End: 2022-07-06

## 2022-07-06 DIAGNOSIS — E78.00 HYPERCHOLESTEREMIA: ICD-10-CM

## 2022-07-06 DIAGNOSIS — I10 ESSENTIAL HYPERTENSION: Primary | ICD-10-CM

## 2022-07-06 DIAGNOSIS — E11.9 CONTROLLED TYPE 2 DIABETES MELLITUS WITHOUT COMPLICATION, WITHOUT LONG-TERM CURRENT USE OF INSULIN (HCC): ICD-10-CM

## 2022-07-06 NOTE — TELEPHONE ENCOUNTER
Patient scheduled 7-20 for DM.cholesterol checkup. Can her labs be entered ahead to complete for visit. Call patient when entered.

## 2022-07-07 NOTE — TELEPHONE ENCOUNTER
Called pt name and  verified informed patient of message below per pt verbalized understanding no further questions asked.

## 2022-07-08 ENCOUNTER — LAB ENCOUNTER (OUTPATIENT)
Dept: LAB | Age: 57
End: 2022-07-08
Attending: FAMILY MEDICINE
Payer: COMMERCIAL

## 2022-07-08 ENCOUNTER — TELEPHONE (OUTPATIENT)
Dept: GASTROENTEROLOGY | Facility: CLINIC | Age: 57
End: 2022-07-08

## 2022-07-08 DIAGNOSIS — Z86.010 PERSONAL HISTORY OF COLONIC POLYPS: Primary | ICD-10-CM

## 2022-07-08 DIAGNOSIS — E78.00 HYPERCHOLESTEREMIA: ICD-10-CM

## 2022-07-08 DIAGNOSIS — E11.9 CONTROLLED TYPE 2 DIABETES MELLITUS WITHOUT COMPLICATION, WITHOUT LONG-TERM CURRENT USE OF INSULIN (HCC): ICD-10-CM

## 2022-07-08 DIAGNOSIS — I10 ESSENTIAL HYPERTENSION: ICD-10-CM

## 2022-07-08 LAB
ALBUMIN SERPL-MCNC: 4 G/DL (ref 3.4–5)
ALBUMIN/GLOB SERPL: 1.1 {RATIO} (ref 1–2)
ALP LIVER SERPL-CCNC: 74 U/L
ALT SERPL-CCNC: 29 U/L
ANION GAP SERPL CALC-SCNC: 8 MMOL/L (ref 0–18)
AST SERPL-CCNC: 17 U/L (ref 15–37)
BILIRUB SERPL-MCNC: 0.5 MG/DL (ref 0.1–2)
BUN BLD-MCNC: 27 MG/DL (ref 7–18)
BUN/CREAT SERPL: 25.2 (ref 10–20)
CALCIUM BLD-MCNC: 9.8 MG/DL (ref 8.5–10.1)
CHLORIDE SERPL-SCNC: 111 MMOL/L (ref 98–112)
CHOLEST SERPL-MCNC: 201 MG/DL (ref ?–200)
CO2 SERPL-SCNC: 24 MMOL/L (ref 21–32)
CREAT BLD-MCNC: 1.07 MG/DL
CREAT UR-SCNC: 133 MG/DL
EST. AVERAGE GLUCOSE BLD GHB EST-MCNC: 114 MG/DL (ref 68–126)
FASTING PATIENT LIPID ANSWER: YES
FASTING STATUS PATIENT QL REPORTED: YES
GLOBULIN PLAS-MCNC: 3.5 G/DL (ref 2.8–4.4)
GLUCOSE BLD-MCNC: 84 MG/DL (ref 70–99)
HBA1C MFR BLD: 5.6 % (ref ?–5.7)
HDLC SERPL-MCNC: 52 MG/DL (ref 40–59)
LDLC SERPL CALC-MCNC: 139 MG/DL (ref ?–100)
MICROALBUMIN UR-MCNC: 4.39 MG/DL
MICROALBUMIN/CREAT 24H UR-RTO: 33 UG/MG (ref ?–30)
NONHDLC SERPL-MCNC: 149 MG/DL (ref ?–130)
OSMOLALITY SERPL CALC.SUM OF ELEC: 300 MOSM/KG (ref 275–295)
POTASSIUM SERPL-SCNC: 3.6 MMOL/L (ref 3.5–5.1)
PROT SERPL-MCNC: 7.5 G/DL (ref 6.4–8.2)
SODIUM SERPL-SCNC: 143 MMOL/L (ref 136–145)
TRIGL SERPL-MCNC: 57 MG/DL (ref 30–149)
VLDLC SERPL CALC-MCNC: 10 MG/DL (ref 0–30)

## 2022-07-08 PROCEDURE — 82570 ASSAY OF URINE CREATININE: CPT

## 2022-07-08 PROCEDURE — 36415 COLL VENOUS BLD VENIPUNCTURE: CPT

## 2022-07-08 PROCEDURE — 3060F POS MICROALBUMINURIA REV: CPT | Performed by: FAMILY MEDICINE

## 2022-07-08 PROCEDURE — 3061F NEG MICROALBUMINURIA REV: CPT | Performed by: FAMILY MEDICINE

## 2022-07-08 PROCEDURE — 83036 HEMOGLOBIN GLYCOSYLATED A1C: CPT

## 2022-07-08 PROCEDURE — 80053 COMPREHEN METABOLIC PANEL: CPT

## 2022-07-08 PROCEDURE — 82043 UR ALBUMIN QUANTITATIVE: CPT

## 2022-07-08 PROCEDURE — 3044F HG A1C LEVEL LT 7.0%: CPT | Performed by: FAMILY MEDICINE

## 2022-07-08 PROCEDURE — 80061 LIPID PANEL: CPT

## 2022-07-08 NOTE — TELEPHONE ENCOUNTER
Patient calling to reschedule cln 7/12, patient informed about the 5 business day turn around. Please call at [phone],[employer].

## 2022-07-11 NOTE — TELEPHONE ENCOUNTER
Per phone room TE, pt is cancelling her procedure. CBLM to reschedule procedure. Pt notified of cancellation on VM box. Canceled in [address]. Please transfer to [facility] at ext [phone] for rescheduling.         Canceled for:  Colonoscopy - 55310  Provider Name:  Dr. Elgin Marin   Date:  7/12/22  Location:  [facility]  Sedation:  MAC  Time:  9:00 am  Prep:  Kip Acampo  Meds/Allergies Reconciled?:  Yes  Diagnosis with codes:  Hx of colon polyps - Z86.010

## 2022-07-11 NOTE — TELEPHONE ENCOUNTER
Pt is returning the nurses call to resched her proc. I tried to reach, but no answer. Pt would like a Saturday appt.

## 2022-07-11 NOTE — TELEPHONE ENCOUNTER
Spoke to pt, she would like to discuss with her PCP before rescheduling. Pt will call back to reschedule when ready. TE closed.

## 2022-07-20 ENCOUNTER — OFFICE VISIT (OUTPATIENT)
Dept: FAMILY MEDICINE CLINIC | Facility: CLINIC | Age: 57
End: 2022-07-20
Payer: COMMERCIAL

## 2022-07-20 ENCOUNTER — LAB ENCOUNTER (OUTPATIENT)
Dept: LAB | Age: 57
End: 2022-07-20
Attending: FAMILY MEDICINE
Payer: COMMERCIAL

## 2022-07-20 VITALS
DIASTOLIC BLOOD PRESSURE: 84 MMHG | SYSTOLIC BLOOD PRESSURE: 132 MMHG | HEIGHT: 65 IN | BODY MASS INDEX: 38.32 KG/M2 | WEIGHT: 230 LBS | HEART RATE: 71 BPM | TEMPERATURE: 98 F

## 2022-07-20 DIAGNOSIS — M21.41 FLAT FEET, BILATERAL: ICD-10-CM

## 2022-07-20 DIAGNOSIS — R80.9 MICROALBUMINURIA: ICD-10-CM

## 2022-07-20 DIAGNOSIS — E66.9 OBESITY (BMI 30-39.9): ICD-10-CM

## 2022-07-20 DIAGNOSIS — I10 ESSENTIAL HYPERTENSION: ICD-10-CM

## 2022-07-20 DIAGNOSIS — F10.20 ALCOHOLISM (HCC): ICD-10-CM

## 2022-07-20 DIAGNOSIS — M21.42 FLAT FEET, BILATERAL: ICD-10-CM

## 2022-07-20 DIAGNOSIS — K63.5 POLYP OF COLON, UNSPECIFIED PART OF COLON, UNSPECIFIED TYPE: ICD-10-CM

## 2022-07-20 DIAGNOSIS — Z23 NEED FOR SHINGLES VACCINE: ICD-10-CM

## 2022-07-20 DIAGNOSIS — E11.9 CONTROLLED TYPE 2 DIABETES MELLITUS WITHOUT COMPLICATION, WITHOUT LONG-TERM CURRENT USE OF INSULIN (HCC): ICD-10-CM

## 2022-07-20 DIAGNOSIS — I10 ESSENTIAL HYPERTENSION: Primary | ICD-10-CM

## 2022-07-20 DIAGNOSIS — E78.00 HYPERCHOLESTEREMIA: ICD-10-CM

## 2022-07-20 DIAGNOSIS — Z12.11 COLON CANCER SCREENING: ICD-10-CM

## 2022-07-20 PROBLEM — E66.01 MORBID (SEVERE) OBESITY DUE TO EXCESS CALORIES (HCC): Status: ACTIVE | Noted: 2022-07-20

## 2022-07-20 PROBLEM — N28.9 DECREASED RENAL FUNCTION: Status: RESOLVED | Noted: 2019-12-17 | Resolved: 2022-07-20

## 2022-07-20 PROCEDURE — 3008F BODY MASS INDEX DOCD: CPT | Performed by: FAMILY MEDICINE

## 2022-07-20 PROCEDURE — 90677 PCV20 VACCINE IM: CPT | Performed by: FAMILY MEDICINE

## 2022-07-20 PROCEDURE — 93005 ELECTROCARDIOGRAM TRACING: CPT

## 2022-07-20 PROCEDURE — 3079F DIAST BP 80-89 MM HG: CPT | Performed by: FAMILY MEDICINE

## 2022-07-20 PROCEDURE — 90471 IMMUNIZATION ADMIN: CPT | Performed by: FAMILY MEDICINE

## 2022-07-20 PROCEDURE — 3075F SYST BP GE 130 - 139MM HG: CPT | Performed by: FAMILY MEDICINE

## 2022-07-20 PROCEDURE — 93010 ELECTROCARDIOGRAM REPORT: CPT | Performed by: FAMILY MEDICINE

## 2022-07-20 PROCEDURE — 99214 OFFICE O/P EST MOD 30 MIN: CPT | Performed by: FAMILY MEDICINE

## 2022-07-20 RX ORDER — IBUPROFEN 600 MG/1
600 TABLET ORAL EVERY 8 HOURS PRN
COMMUNITY
Start: 2022-07-03

## 2022-07-20 RX ORDER — MULTIVITAMIN WITH IRON
50 TABLET ORAL DAILY
COMMUNITY

## 2022-07-20 RX ORDER — ROSUVASTATIN CALCIUM 5 MG/1
5 TABLET, COATED ORAL NIGHTLY
Qty: 90 TABLET | Refills: 0 | Status: SHIPPED | OUTPATIENT
Start: 2022-07-20

## 2022-07-20 RX ORDER — MULTIVITAMIN
TABLET ORAL
COMMUNITY

## 2022-09-06 ENCOUNTER — TELEPHONE (OUTPATIENT)
Dept: GASTROENTEROLOGY | Facility: CLINIC | Age: 57
End: 2022-09-06

## 2022-09-06 NOTE — TELEPHONE ENCOUNTER
GI Schedulers-    See TE 7/8/22. Patient is calling back to reschedule procedure. CLN was cancelled on 7/12/22. Please assist in getting rescheduled for a colonoscopy with Dr. Haydee Guzman. Dx: Hx of Colon Polyps Z86.010  Prep: Trilyte  Sedation: MAC    Thank you!

## 2022-10-06 RX ORDER — IBUPROFEN 600 MG/1
TABLET ORAL
Qty: 90 TABLET | Refills: 1 | OUTPATIENT
Start: 2022-10-06

## 2022-10-22 DIAGNOSIS — I10 ESSENTIAL HYPERTENSION: ICD-10-CM

## 2022-10-24 RX ORDER — LOSARTAN POTASSIUM AND HYDROCHLOROTHIAZIDE 12.5; 1 MG/1; MG/1
TABLET ORAL
Qty: 90 TABLET | Refills: 1 | Status: SHIPPED | OUTPATIENT
Start: 2022-10-24

## 2022-10-24 RX ORDER — AMLODIPINE BESYLATE 10 MG/1
10 TABLET ORAL DAILY
Qty: 90 TABLET | Refills: 1 | Status: SHIPPED | OUTPATIENT
Start: 2022-10-24

## 2022-10-24 NOTE — TELEPHONE ENCOUNTER
Please review. Protocol failed / No protocol.   Requested Prescriptions   Pending Prescriptions Disp Refills    LOSARTAN POTASSIUM-HCTZ 100-12.5 MG Oral Tab [Pharmacy Med Name: Joey Negrete 100-12.5 MG TAB] 90 tablet 1     Sig: TAKE 1 TABLET BY MOUTH EVERY DAY        Hypertensive Medications Protocol Failed - 10/22/2022 12:11 PM        Failed - Last BP reading less than 140/90     BP Readings from Last 1 Encounters:  10/11/22 : (!) 139/92                Passed - In person appointment in the past 12 or next 3 months       Recent Outpatient Visits              1 week ago Adjustment disorder with mixed anxiety and depressed mood    Ant Lara MD    Office Visit    3 months ago Essential hypertension    Sweeten, Nathalie DUMONT MD    Office Visit    6 months ago     [address] [facility] Provider    E-Visit    6 months ago Acute sinusitis, recurrence not specified, unspecified location    [facility] Virtual Visit Shamika Aldridge NP    E-Visit    8 months ago Well adult exam    Sweeten, Nathalie DUMONT MD    Office Visit                 Passed - CMP or BMP in past 6 months     Recent Results (from the past 4392 hour(s))   COMP METABOLIC PANEL (14)    Collection Time: 07/08/22 11:37 AM   Result Value Ref Range    Glucose 84 70 - 99 mg/dL    Sodium 143 136 - 145 mmol/L    Potassium 3.6 3.5 - 5.1 mmol/L    Chloride 111 98 - 112 mmol/L    CO2 24.0 21.0 - 32.0 mmol/L    Anion Gap 8 0 - 18 mmol/L    BUN 27 (H) 7 - 18 mg/dL    Creatinine 1.07 (H) 0.55 - 1.02 mg/dL    BUN/CREA Ratio 25.2 (H) 10.0 - 20.0    Calcium, Total 9.8 8.5 - 10.1 mg/dL    Calculated Osmolality 300 (H) 275 - 295 mOsm/kg    GFR, Non- 57 (L) >=60    GFR, -American 66 >=60    ALT 29 13 - 56 U/L    AST 17 15 - 37 U/L    Alkaline Phosphatase 74 46 - 118 U/L    Bilirubin, Total 0.5 0.1 - 2.0 mg/dL    Total Protein 7.5 6.4 - 8.2 g/dL    Albumin 4.0 3.4 - 5.0 g/dL    Globulin  3.5 2.8 - 4.4 g/dL    A/G Ratio 1.1 1.0 - 2.0    Patient Fasting for CMP? Yes      *Note: Due to a large number of results and/or encounters for the requested time period, some results have not been displayed. A complete set of results can be found in Results Review.                  Passed - In person appointment or virtual visit in the past 6 months       Recent Outpatient Visits              1 week ago Adjustment disorder with mixed anxiety and depressed mood    Richard Garcia MD    Office Visit    3 months ago Essential hypertension    Richard Garcia MD    Office Visit    6 months ago     [employer], Generic Provider    E-Visit    6 months ago Acute sinusitis, recurrence not specified, unspecified location    [facility] Virtual Visit Noah Abreu NP    E-Visit    8 months ago Well adult exam    Richard Garcia MD    Office Visit                 Passed - EGFRCR or GFRAA > 50     GFR Evaluation  GFRAA: 66 , resulted on 7/8/2022               AMLODIPINE 10 MG Oral Tab [Pharmacy Med Name: AMLODIPINE BESYLATE 10 MG TAB] 90 tablet 3     Sig: TAKE 1 TABLET BY MOUTH EVERY DAY        Hypertensive Medications Protocol Failed - 10/22/2022 12:11 PM        Failed - Last BP reading less than 140/90     BP Readings from Last 1 Encounters:  10/11/22 : (!) 139/92                Passed - In person appointment in the past 12 or next 3 months       Recent Outpatient Visits              1 week ago Adjustment disorder with mixed anxiety and depressed mood    Richard Garcia MD    Office Visit    3 months ago Essential hypertension    Richard Garcia MD    Office Visit    6 months ago [address] [facility] [employer] Provider    E-Visit    6 months ago Acute sinusitis, recurrence not specified, unspecified location    [facility] Virtual Visit Brandon Lopez NP    E-Visit    8 months ago Well adult exam    [facility], Vannesa Roberts MD    Office Visit                 Passed - CMP or BMP in past 6 months     Recent Results (from the past 4392 hour(s))   COMP METABOLIC PANEL (14)    Collection Time: 07/08/22 11:37 AM   Result Value Ref Range    Glucose 84 70 - 99 mg/dL    Sodium 143 136 - 145 mmol/L    Potassium 3.6 3.5 - 5.1 mmol/L    Chloride 111 98 - 112 mmol/L    CO2 24.0 21.0 - 32.0 mmol/L    Anion Gap 8 0 - 18 mmol/L    BUN 27 (H) 7 - 18 mg/dL    Creatinine 1.07 (H) 0.55 - 1.02 mg/dL    BUN/CREA Ratio 25.2 (H) 10.0 - 20.0    Calcium, Total 9.8 8.5 - 10.1 mg/dL    Calculated Osmolality 300 (H) 275 - 295 mOsm/kg    GFR, Non- 57 (L) >=60    GFR, -American 66 >=60    ALT 29 13 - 56 U/L    AST 17 15 - 37 U/L    Alkaline Phosphatase 74 46 - 118 U/L    Bilirubin, Total 0.5 0.1 - 2.0 mg/dL    Total Protein 7.5 6.4 - 8.2 g/dL    Albumin 4.0 3.4 - 5.0 g/dL    Globulin  3.5 2.8 - 4.4 g/dL    A/G Ratio 1.1 1.0 - 2.0    Patient Fasting for CMP? Yes      *Note: Due to a large number of results and/or encounters for the requested time period, some results have not been displayed. A complete set of results can be found in Results Review.                  Passed - In person appointment or virtual visit in the past 6 months       Recent Outpatient Visits              1 week ago Adjustment disorder with mixed anxiety and depressed mood    Jaycob Hernandez MD    Office Visit    3 months ago Essential hypertension    Jaycob Hernandez MD    Office Visit    6 months ago     [facility] [employer] Provider E-Visit    6 months ago Acute sinusitis, recurrence not specified, unspecified location    [facility] Virtual Visit Vci Pinzon NP    E-Visit    8 months ago Well adult exam    Jen Lind MD    Office Visit                 Passed - EGFRCR or GFRAA > 50     GFR Evaluation  GFRAA: 66 , resulted on 7/8/2022                 Recent Outpatient Visits              1 week ago Adjustment disorder with mixed anxiety and depressed mood    Jen Lind MD    Office Visit    3 months ago Essential hypertension    Jen Lind MD    Office Visit    6 months ago     [employer], Generic Provider    E-Visit    6 months ago Acute sinusitis, recurrence not specified, unspecified location    [facility] Virtual Visit Vic Pinzon NP    E-Visit    8 months ago Well adult exam    Jen Lind MD    Office Visit

## 2022-11-02 NOTE — TELEPHONE ENCOUNTER
----- Message from Katia Frye MD sent at 12/20/2017  8:44 PM CST -----  HIV test is negative. Please call to inform patient.
Patient transferred from phone room - advised patient of test results. Pt verbalized understanding.
Phone call made no answer. LV to call back so results could be related.
n/a

## 2022-11-03 NOTE — TELEPHONE ENCOUNTER
This is the third attempt to reach this pt in regards to scheduling with NO success. Letter sent. TE closed.

## 2022-11-19 DIAGNOSIS — E78.00 HYPERCHOLESTEREMIA: ICD-10-CM

## 2022-11-21 RX ORDER — ROSUVASTATIN CALCIUM 5 MG/1
TABLET, COATED ORAL
Qty: 90 TABLET | Refills: 0 | Status: SHIPPED | OUTPATIENT
Start: 2022-11-21

## 2022-11-21 NOTE — TELEPHONE ENCOUNTER
Please review. Protocol failed/ No protocol.     Requested Prescriptions   Pending Prescriptions Disp Refills    ROSUVASTATIN 5 MG Oral Tab [Pharmacy Med Name: ROSUVASTATIN CALCIUM 5 MG TAB] 90 tablet 0     Sig: TAKE 1 TABLET BY MOUTH EVERY DAY AT NIGHT       Cholesterol Medication Protocol Failed - 11/19/2022 11:39 AM        Failed - Last LDL < 130     Lab Results   Component Value Date     (H) 07/08/2022             Passed - ALT in past 12 months        Passed - LDL in past 12 months        Passed - Last ALT < 80     Lab Results   Component Value Date    ALT 29 07/08/2022             Passed - In person appointment or virtual visit in the past 12 mos or appointment in next 3 mos     Recent Outpatient Visits              1 month ago Adjustment disorder with mixed anxiety and depressed mood    Bill Rodriguez MD    Office Visit    4 months ago Essential hypertension    Bill Rodriguez MD    Office Visit    7 months ago     [employer], Generic Provider    E-Visit    7 months ago Acute sinusitis, recurrence not specified, unspecified location    [facility] Virtual Visit Karo Lima NP    E-Visit    9 months ago Well adult exam    Bill Rodriguez MD    Office Visit                           Recent Outpatient Visits              1 month ago Adjustment disorder with mixed anxiety and depressed mood    Bill Rodriguez MD    Office Visit    4 months ago Essential hypertension    Bill Rodriguez MD    Office Visit    7 months ago     [employer], Generic Provider    E-Visit    7 months ago Acute sinusitis, recurrence not specified, unspecified location    [facility] Virtual Visit Karo Lima NP    E-Visit    9 months ago Well adult exam    Len Solis MD    Office Visit

## 2023-01-12 DIAGNOSIS — E87.6 HYPOKALEMIA: ICD-10-CM

## 2023-01-15 DIAGNOSIS — E55.9 VITAMIN D DEFICIENCY: Primary | ICD-10-CM

## 2023-01-15 RX ORDER — POTASSIUM CHLORIDE 1500 MG/1
TABLET, FILM COATED, EXTENDED RELEASE ORAL
Qty: 90 TABLET | Refills: 1 | Status: SHIPPED | OUTPATIENT
Start: 2023-01-15

## 2023-01-15 RX ORDER — ERGOCALCIFEROL 1.25 MG/1
CAPSULE ORAL
Qty: 12 CAPSULE | Refills: 3 | Status: SHIPPED | OUTPATIENT
Start: 2023-01-15

## 2023-03-09 ENCOUNTER — OFFICE VISIT (OUTPATIENT)
Dept: FAMILY MEDICINE CLINIC | Facility: CLINIC | Age: 58
End: 2023-03-09

## 2023-03-09 VITALS
OXYGEN SATURATION: 100 % | HEIGHT: 65 IN | SYSTOLIC BLOOD PRESSURE: 144 MMHG | HEART RATE: 83 BPM | WEIGHT: 241 LBS | DIASTOLIC BLOOD PRESSURE: 73 MMHG | BODY MASS INDEX: 40.15 KG/M2

## 2023-03-09 DIAGNOSIS — E78.00 HYPERCHOLESTEREMIA: ICD-10-CM

## 2023-03-09 DIAGNOSIS — R22.1 NECK MASS: ICD-10-CM

## 2023-03-09 DIAGNOSIS — Z12.11 COLON CANCER SCREENING: ICD-10-CM

## 2023-03-09 DIAGNOSIS — E11.9 CONTROLLED TYPE 2 DIABETES MELLITUS WITHOUT COMPLICATION, WITHOUT LONG-TERM CURRENT USE OF INSULIN (HCC): ICD-10-CM

## 2023-03-09 DIAGNOSIS — Z00.00 WELL ADULT EXAM: ICD-10-CM

## 2023-03-09 DIAGNOSIS — Z12.31 ENCOUNTER FOR SCREENING MAMMOGRAM FOR BREAST CANCER: ICD-10-CM

## 2023-03-09 DIAGNOSIS — I10 PRIMARY HYPERTENSION: Primary | ICD-10-CM

## 2023-03-09 PROCEDURE — 3078F DIAST BP <80 MM HG: CPT | Performed by: FAMILY MEDICINE

## 2023-03-09 PROCEDURE — 99214 OFFICE O/P EST MOD 30 MIN: CPT | Performed by: FAMILY MEDICINE

## 2023-03-09 PROCEDURE — 3077F SYST BP >= 140 MM HG: CPT | Performed by: FAMILY MEDICINE

## 2023-03-09 PROCEDURE — 3008F BODY MASS INDEX DOCD: CPT | Performed by: FAMILY MEDICINE

## 2023-03-09 RX ORDER — ALIROCUMAB 75 MG/ML
INJECTION, SOLUTION SUBCUTANEOUS
COMMUNITY
Start: 2023-03-07 | End: 2023-03-09

## 2023-03-18 ENCOUNTER — LAB ENCOUNTER (OUTPATIENT)
Dept: LAB | Age: 58
End: 2023-03-18
Attending: FAMILY MEDICINE
Payer: COMMERCIAL

## 2023-03-18 DIAGNOSIS — E55.9 VITAMIN D DEFICIENCY: ICD-10-CM

## 2023-03-18 DIAGNOSIS — Z00.00 WELL ADULT EXAM: ICD-10-CM

## 2023-03-18 DIAGNOSIS — E11.9 CONTROLLED TYPE 2 DIABETES MELLITUS WITHOUT COMPLICATION, WITHOUT LONG-TERM CURRENT USE OF INSULIN (HCC): ICD-10-CM

## 2023-03-18 LAB
ALBUMIN SERPL-MCNC: 3.7 G/DL (ref 3.4–5)
ALBUMIN/GLOB SERPL: 1 {RATIO} (ref 1–2)
ALP LIVER SERPL-CCNC: 90 U/L
ALT SERPL-CCNC: 27 U/L
ANION GAP SERPL CALC-SCNC: 6 MMOL/L (ref 0–18)
AST SERPL-CCNC: 18 U/L (ref 15–37)
BASOPHILS # BLD AUTO: 0.03 X10(3) UL (ref 0–0.2)
BASOPHILS NFR BLD AUTO: 0.5 %
BILIRUB SERPL-MCNC: 0.4 MG/DL (ref 0.1–2)
BUN BLD-MCNC: 23 MG/DL (ref 7–18)
BUN/CREAT SERPL: 21.7 (ref 10–20)
CALCIUM BLD-MCNC: 9 MG/DL (ref 8.5–10.1)
CHLORIDE SERPL-SCNC: 111 MMOL/L (ref 98–112)
CHOLEST SERPL-MCNC: 204 MG/DL (ref ?–200)
CO2 SERPL-SCNC: 29 MMOL/L (ref 21–32)
CREAT BLD-MCNC: 1.06 MG/DL
DEPRECATED RDW RBC AUTO: 46 FL (ref 35.1–46.3)
EOSINOPHIL # BLD AUTO: 0.16 X10(3) UL (ref 0–0.7)
EOSINOPHIL NFR BLD AUTO: 2.9 %
ERYTHROCYTE [DISTWIDTH] IN BLOOD BY AUTOMATED COUNT: 15.1 % (ref 11–15)
EST. AVERAGE GLUCOSE BLD GHB EST-MCNC: 126 MG/DL (ref 68–126)
FASTING PATIENT LIPID ANSWER: YES
FASTING STATUS PATIENT QL REPORTED: YES
GFR SERPLBLD BASED ON 1.73 SQ M-ARVRAT: 61 ML/MIN/1.73M2 (ref 60–?)
GLOBULIN PLAS-MCNC: 3.8 G/DL (ref 2.8–4.4)
GLUCOSE BLD-MCNC: 89 MG/DL (ref 70–99)
HBA1C MFR BLD: 6 % (ref ?–5.7)
HCT VFR BLD AUTO: 42.1 %
HDLC SERPL-MCNC: 59 MG/DL (ref 40–59)
HGB BLD-MCNC: 13.1 G/DL
IMM GRANULOCYTES # BLD AUTO: 0.01 X10(3) UL (ref 0–1)
IMM GRANULOCYTES NFR BLD: 0.2 %
LDLC SERPL CALC-MCNC: 134 MG/DL (ref ?–100)
LYMPHOCYTES # BLD AUTO: 2.7 X10(3) UL (ref 1–4)
LYMPHOCYTES NFR BLD AUTO: 48.1 %
MCH RBC QN AUTO: 25.9 PG (ref 26–34)
MCHC RBC AUTO-ENTMCNC: 31.1 G/DL (ref 31–37)
MCV RBC AUTO: 83.2 FL
MONOCYTES # BLD AUTO: 0.41 X10(3) UL (ref 0.1–1)
MONOCYTES NFR BLD AUTO: 7.3 %
NEUTROPHILS # BLD AUTO: 2.3 X10 (3) UL (ref 1.5–7.7)
NEUTROPHILS # BLD AUTO: 2.3 X10(3) UL (ref 1.5–7.7)
NEUTROPHILS NFR BLD AUTO: 41 %
NONHDLC SERPL-MCNC: 145 MG/DL (ref ?–130)
OSMOLALITY SERPL CALC.SUM OF ELEC: 305 MOSM/KG (ref 275–295)
PLATELET # BLD AUTO: 222 10(3)UL (ref 150–450)
POTASSIUM SERPL-SCNC: 3.8 MMOL/L (ref 3.5–5.1)
PROT SERPL-MCNC: 7.5 G/DL (ref 6.4–8.2)
RBC # BLD AUTO: 5.06 X10(6)UL
SODIUM SERPL-SCNC: 146 MMOL/L (ref 136–145)
TRIGL SERPL-MCNC: 59 MG/DL (ref 30–149)
TSI SER-ACNC: 0.54 MIU/ML (ref 0.36–3.74)
VIT D+METAB SERPL-MCNC: 87.5 NG/ML (ref 30–100)
VLDLC SERPL CALC-MCNC: 11 MG/DL (ref 0–30)
WBC # BLD AUTO: 5.6 X10(3) UL (ref 4–11)

## 2023-03-18 PROCEDURE — 80053 COMPREHEN METABOLIC PANEL: CPT

## 2023-03-18 PROCEDURE — 80061 LIPID PANEL: CPT

## 2023-03-18 PROCEDURE — 84443 ASSAY THYROID STIM HORMONE: CPT

## 2023-03-18 PROCEDURE — 85025 COMPLETE CBC W/AUTO DIFF WBC: CPT

## 2023-03-18 PROCEDURE — 3044F HG A1C LEVEL LT 7.0%: CPT | Performed by: FAMILY MEDICINE

## 2023-03-18 PROCEDURE — 82306 VITAMIN D 25 HYDROXY: CPT

## 2023-03-18 PROCEDURE — 36415 COLL VENOUS BLD VENIPUNCTURE: CPT

## 2023-03-18 PROCEDURE — 83036 HEMOGLOBIN GLYCOSYLATED A1C: CPT

## 2023-03-25 ENCOUNTER — OFFICE VISIT (OUTPATIENT)
Dept: FAMILY MEDICINE CLINIC | Facility: CLINIC | Age: 58
End: 2023-03-25

## 2023-03-25 VITALS
BODY MASS INDEX: 40.15 KG/M2 | HEIGHT: 65 IN | WEIGHT: 241 LBS | SYSTOLIC BLOOD PRESSURE: 138 MMHG | DIASTOLIC BLOOD PRESSURE: 82 MMHG

## 2023-03-25 DIAGNOSIS — Z12.11 COLON CANCER SCREENING: ICD-10-CM

## 2023-03-25 DIAGNOSIS — E87.6 HYPOKALEMIA: ICD-10-CM

## 2023-03-25 DIAGNOSIS — Z00.00 WELL ADULT EXAM: Primary | ICD-10-CM

## 2023-03-25 DIAGNOSIS — Z01.419 ENCOUNTER FOR GYNECOLOGICAL EXAMINATION: ICD-10-CM

## 2023-03-25 DIAGNOSIS — F43.23 ADJUSTMENT DISORDER WITH MIXED ANXIETY AND DEPRESSED MOOD: ICD-10-CM

## 2023-03-25 DIAGNOSIS — E55.9 VITAMIN D DEFICIENCY: ICD-10-CM

## 2023-03-25 DIAGNOSIS — Z23 NEED FOR SHINGLES VACCINE: ICD-10-CM

## 2023-03-25 DIAGNOSIS — I10 ESSENTIAL HYPERTENSION: ICD-10-CM

## 2023-03-25 DIAGNOSIS — F10.20 ALCOHOLISM (HCC): ICD-10-CM

## 2023-03-25 DIAGNOSIS — E78.00 HYPERCHOLESTEREMIA: ICD-10-CM

## 2023-03-25 DIAGNOSIS — K63.5 POLYP OF COLON, UNSPECIFIED PART OF COLON, UNSPECIFIED TYPE: ICD-10-CM

## 2023-03-25 DIAGNOSIS — E11.9 CONTROLLED TYPE 2 DIABETES MELLITUS WITHOUT COMPLICATION, WITHOUT LONG-TERM CURRENT USE OF INSULIN (HCC): ICD-10-CM

## 2023-03-25 PROBLEM — I83.811 VARICOSE VEINS OF LEG WITH PAIN, RIGHT: Status: RESOLVED | Noted: 2021-08-21 | Resolved: 2023-03-25

## 2023-03-25 PROBLEM — R52 GENERALIZED BODY ACHES: Status: RESOLVED | Noted: 2021-08-21 | Resolved: 2023-03-25

## 2023-03-25 PROBLEM — E66.01 MORBID (SEVERE) OBESITY DUE TO EXCESS CALORIES (HCC): Status: RESOLVED | Noted: 2022-07-20 | Resolved: 2023-03-25

## 2023-03-25 PROBLEM — M23.91 INTERNAL DERANGEMENT OF KNEE, RIGHT: Status: RESOLVED | Noted: 2017-04-10 | Resolved: 2023-03-25

## 2023-03-25 PROBLEM — Z20.6 HIV EXPOSURE: Status: RESOLVED | Noted: 2021-06-01 | Resolved: 2023-03-25

## 2023-03-25 PROBLEM — H93.11 TINNITUS, RIGHT EAR: Status: RESOLVED | Noted: 2018-03-20 | Resolved: 2023-03-25

## 2023-03-25 PROBLEM — T74.31XA: Status: RESOLVED | Noted: 2018-03-20 | Resolved: 2023-03-25

## 2023-03-25 PROBLEM — M25.562 LEFT KNEE PAIN: Status: RESOLVED | Noted: 2021-08-21 | Resolved: 2023-03-25

## 2023-03-25 PROCEDURE — 3008F BODY MASS INDEX DOCD: CPT | Performed by: FAMILY MEDICINE

## 2023-03-25 PROCEDURE — 99396 PREV VISIT EST AGE 40-64: CPT | Performed by: FAMILY MEDICINE

## 2023-03-25 PROCEDURE — 3075F SYST BP GE 130 - 139MM HG: CPT | Performed by: FAMILY MEDICINE

## 2023-03-25 PROCEDURE — 3079F DIAST BP 80-89 MM HG: CPT | Performed by: FAMILY MEDICINE

## 2023-03-25 RX ORDER — POTASSIUM CHLORIDE 1500 MG/1
1 TABLET, FILM COATED, EXTENDED RELEASE ORAL DAILY
Qty: 90 TABLET | Refills: 3 | Status: SHIPPED | OUTPATIENT
Start: 2023-03-25

## 2023-03-25 RX ORDER — AMLODIPINE BESYLATE 10 MG/1
10 TABLET ORAL DAILY
Qty: 90 TABLET | Refills: 3 | Status: SHIPPED | OUTPATIENT
Start: 2023-03-25

## 2023-03-25 RX ORDER — METOPROLOL SUCCINATE 25 MG/1
25 TABLET, EXTENDED RELEASE ORAL DAILY
Qty: 90 TABLET | Refills: 3 | Status: SHIPPED | OUTPATIENT
Start: 2023-03-25

## 2023-03-25 RX ORDER — LOSARTAN POTASSIUM AND HYDROCHLOROTHIAZIDE 12.5; 1 MG/1; MG/1
1 TABLET ORAL DAILY
Qty: 90 TABLET | Refills: 3 | Status: SHIPPED | OUTPATIENT
Start: 2023-03-25

## 2023-04-08 ENCOUNTER — HOSPITAL ENCOUNTER (OUTPATIENT)
Dept: MAMMOGRAPHY | Age: 58
Discharge: HOME | End: 2023-04-08
Attending: FAMILY MEDICINE
Payer: COMMERCIAL

## 2023-04-08 DIAGNOSIS — Z12.31 ENCOUNTER FOR SCREENING MAMMOGRAM FOR BREAST CANCER: ICD-10-CM

## 2023-04-08 PROCEDURE — 77067 SCR MAMMO BI INCL CAD: CPT | Performed by: FAMILY MEDICINE

## 2023-04-08 PROCEDURE — 77063 BREAST TOMOSYNTHESIS BI: CPT | Performed by: FAMILY MEDICINE

## 2023-04-11 LAB — HPV I/H RISK 1 DNA SPEC QL NAA+PROBE: NEGATIVE

## 2023-04-18 LAB
LAST PAP RESULT: NORMAL
PAP HISTORY (OTHER THAN LAST PAP): NORMAL

## 2023-04-24 RX ORDER — FLUTICASONE PROPIONATE 50 MCG
2 SPRAY, SUSPENSION (ML) NASAL DAILY
Qty: 48 ML | Refills: 3 | Status: SHIPPED | OUTPATIENT
Start: 2023-04-24

## 2023-04-24 NOTE — TELEPHONE ENCOUNTER
Please review. Last Rx was , okay to refill? Requested Prescriptions   Pending Prescriptions Disp Refills    FLUTICASONE PROPIONATE 50 MCG/ACT Nasal Suspension [Pharmacy Med Name: FLUTICASONE PROP 50 MCG SPRAY] 48 mL 1     Si SPRAYS BY EACH NARE ROUTE NIGHTLY FOR 3 DAYS.        Allergy Medication Protocol Passed - 2023  2:32 PM        Passed - In person appointment or virtual visit in the past 12 mos or appointment in next 3 mos     Recent Outpatient Visits              1 month ago Well adult exam    William Salazar MD    Office Visit    1 month ago Primary hypertension    William Salazar MD    Office Visit    6 months ago Adjustment disorder with mixed anxiety and depressed mood    William Salazar MD    Office Visit    9 months ago Essential hypertension    Yen Avelar MD    Office Visit    1 year ago     Rachelle Metcalf Visit [facility], Generic Provider    E-Visit          Future Appointments         Provider Department Appt Notes    In 4 days [facility]                   Recent Outpatient Visits              1 month ago Well adult exam    William Salazar MD    Office Visit    1 month ago Primary hypertension    William Salazar MD    Office Visit    6 months ago Adjustment disorder with mixed anxiety and depressed mood    William Salazar MD    Office Visit    9 months ago Essential hypertension    Yen Avelar MD    Office Visit    1 year ago     [facility] Virtual Visit My[facility], Generic Provider    E-Visit           Future Appointments         Provider Department Appt Notes    In 4 days [facility] Mammography QA SLS CONSENT LINKED

## 2023-04-28 ENCOUNTER — HOSPITAL ENCOUNTER (OUTPATIENT)
Dept: MAMMOGRAPHY | Facility: HOSPITAL | Age: 58
Discharge: HOME | End: 2023-04-28
Attending: FAMILY MEDICINE
Payer: COMMERCIAL

## 2023-04-28 DIAGNOSIS — R92.2 INCONCLUSIVE MAMMOGRAM: ICD-10-CM

## 2023-04-28 PROCEDURE — 77065 DX MAMMO INCL CAD UNI: CPT | Performed by: FAMILY MEDICINE

## 2023-04-28 PROCEDURE — 77061 BREAST TOMOSYNTHESIS UNI: CPT | Performed by: FAMILY MEDICINE

## 2023-07-24 ENCOUNTER — PATIENT OUTREACH (OUTPATIENT)
Dept: FAMILY MEDICINE CLINIC | Facility: CLINIC | Age: 58
End: 2023-07-24

## 2023-08-09 ENCOUNTER — HOSPITAL ENCOUNTER (OUTPATIENT)
Dept: GENERAL RADIOLOGY | Age: 58
Discharge: HOME | End: 2023-08-09
Attending: FAMILY MEDICINE
Payer: COMMERCIAL

## 2023-08-09 ENCOUNTER — OFFICE VISIT (OUTPATIENT)
Dept: FAMILY MEDICINE CLINIC | Facility: CLINIC | Age: 58
End: 2023-08-09

## 2023-08-09 VITALS
HEART RATE: 78 BPM | HEIGHT: 65 IN | RESPIRATION RATE: 18 BRPM | WEIGHT: 246.31 LBS | BODY MASS INDEX: 41.04 KG/M2 | DIASTOLIC BLOOD PRESSURE: 86 MMHG | SYSTOLIC BLOOD PRESSURE: 134 MMHG

## 2023-08-09 DIAGNOSIS — R09.82 POSTNASAL DRIP: Primary | ICD-10-CM

## 2023-08-09 DIAGNOSIS — R07.89 CHEST TIGHTNESS: ICD-10-CM

## 2023-08-09 PROCEDURE — 3008F BODY MASS INDEX DOCD: CPT | Performed by: FAMILY MEDICINE

## 2023-08-09 PROCEDURE — 99213 OFFICE O/P EST LOW 20 MIN: CPT | Performed by: FAMILY MEDICINE

## 2023-08-09 PROCEDURE — 3079F DIAST BP 80-89 MM HG: CPT | Performed by: FAMILY MEDICINE

## 2023-08-09 PROCEDURE — 3075F SYST BP GE 130 - 139MM HG: CPT | Performed by: FAMILY MEDICINE

## 2023-08-09 PROCEDURE — 71046 X-RAY EXAM CHEST 2 VIEWS: CPT | Performed by: FAMILY MEDICINE

## 2023-08-09 RX ORDER — AMOXICILLIN 875 MG/1
875 TABLET, COATED ORAL 2 TIMES DAILY
Qty: 20 TABLET | Refills: 0 | Status: SHIPPED | OUTPATIENT
Start: 2023-08-09 | End: 2023-08-19

## 2023-08-09 RX ORDER — FLUTICASONE PROPIONATE 50 MCG
2 SPRAY, SUSPENSION (ML) NASAL DAILY
Qty: 16 G | Refills: 0 | Status: SHIPPED | OUTPATIENT
Start: 2023-08-09 | End: 2023-08-19

## 2023-08-09 RX ORDER — ECHINACEA PURPUREA EXTRACT 125 MG
1 TABLET ORAL AS NEEDED
Qty: 60 ML | Refills: 0 | Status: SHIPPED | OUTPATIENT
Start: 2023-08-09

## 2023-08-10 ENCOUNTER — APPOINTMENT (OUTPATIENT)
Dept: GENERAL RADIOLOGY | Facility: HOSPITAL | Age: 58
End: 2023-08-10
Payer: COMMERCIAL

## 2023-08-10 ENCOUNTER — HOSPITAL ENCOUNTER (EMERGENCY)
Facility: HOSPITAL | Age: 58
Discharge: HOME | End: 2023-08-10
Attending: EMERGENCY MEDICINE
Payer: COMMERCIAL

## 2023-08-10 VITALS
OXYGEN SATURATION: 99 % | HEIGHT: 65 IN | HEART RATE: 79 BPM | TEMPERATURE: 98 F | WEIGHT: 246.25 LBS | RESPIRATION RATE: 17 BRPM | SYSTOLIC BLOOD PRESSURE: 141 MMHG | DIASTOLIC BLOOD PRESSURE: 82 MMHG | BODY MASS INDEX: 41.03 KG/M2

## 2023-08-10 DIAGNOSIS — R06.2 WHEEZING: Primary | ICD-10-CM

## 2023-08-10 LAB
ALBUMIN SERPL-MCNC: 3.6 G/DL (ref 3.4–5)
ALBUMIN/GLOB SERPL: 0.9 {RATIO} (ref 1–2)
ALP LIVER SERPL-CCNC: 92 U/L
ALT SERPL-CCNC: 36 U/L
ANION GAP SERPL CALC-SCNC: 4 MMOL/L (ref 0–18)
AST SERPL-CCNC: 24 U/L (ref 15–37)
ATRIAL RATE: 83 BPM
BASOPHILS # BLD AUTO: 0.02 X10(3) UL (ref 0–0.2)
BASOPHILS NFR BLD AUTO: 0.3 %
BILIRUB SERPL-MCNC: 0.4 MG/DL (ref 0.1–2)
BUN BLD-MCNC: 16 MG/DL (ref 7–18)
BUN/CREAT SERPL: 13 (ref 10–20)
CALCIUM BLD-MCNC: 9.1 MG/DL (ref 8.5–10.1)
CHLORIDE SERPL-SCNC: 109 MMOL/L (ref 98–112)
CO2 SERPL-SCNC: 28 MMOL/L (ref 21–32)
CREAT BLD-MCNC: 1.23 MG/DL
DEPRECATED RDW RBC AUTO: 48.2 FL (ref 35.1–46.3)
EGFRCR SERPLBLD CKD-EPI 2021: 51 ML/MIN/1.73M2 (ref 60–?)
EOSINOPHIL # BLD AUTO: 0.26 X10(3) UL (ref 0–0.7)
EOSINOPHIL NFR BLD AUTO: 3.6 %
ERYTHROCYTE [DISTWIDTH] IN BLOOD BY AUTOMATED COUNT: 15.8 % (ref 11–15)
GLOBULIN PLAS-MCNC: 3.8 G/DL (ref 2.8–4.4)
GLUCOSE BLD-MCNC: 135 MG/DL (ref 70–99)
HCT VFR BLD AUTO: 40.1 %
HGB BLD-MCNC: 12.6 G/DL
IMM GRANULOCYTES # BLD AUTO: 0.02 X10(3) UL (ref 0–1)
IMM GRANULOCYTES NFR BLD: 0.3 %
INR BLD: 1.02 (ref 0.85–1.16)
LYMPHOCYTES # BLD AUTO: 1.35 X10(3) UL (ref 1–4)
LYMPHOCYTES NFR BLD AUTO: 18.7 %
MCH RBC QN AUTO: 26.1 PG (ref 26–34)
MCHC RBC AUTO-ENTMCNC: 31.4 G/DL (ref 31–37)
MCV RBC AUTO: 83.2 FL
MONOCYTES # BLD AUTO: 0.58 X10(3) UL (ref 0.1–1)
MONOCYTES NFR BLD AUTO: 8 %
NEUTROPHILS # BLD AUTO: 4.99 X10 (3) UL (ref 1.5–7.7)
NEUTROPHILS # BLD AUTO: 4.99 X10(3) UL (ref 1.5–7.7)
NEUTROPHILS NFR BLD AUTO: 69.1 %
NT-PROBNP SERPL-MCNC: 93 PG/ML (ref ?–125)
OSMOLALITY SERPL CALC.SUM OF ELEC: 295 MOSM/KG (ref 275–295)
P AXIS: 57 DEGREES
P-R INTERVAL: 198 MS
PLATELET # BLD AUTO: 209 10(3)UL (ref 150–450)
POTASSIUM SERPL-SCNC: 3.5 MMOL/L (ref 3.5–5.1)
PROT SERPL-MCNC: 7.4 G/DL (ref 6.4–8.2)
PROTHROMBIN TIME: 13.3 SECONDS (ref 11.6–14.8)
Q-T INTERVAL: 392 MS
QRS DURATION: 92 MS
QTC CALCULATION (BEZET): 460 MS
R AXIS: 13 DEGREES
RBC # BLD AUTO: 4.82 X10(6)UL
SARS-COV-2 RNA RESP QL NAA+PROBE: NOT DETECTED
SODIUM SERPL-SCNC: 141 MMOL/L (ref 136–145)
T AXIS: 29 DEGREES
TROPONIN I HIGH SENSITIVITY: 14 NG/L
VENTRICULAR RATE: 83 BPM
WBC # BLD AUTO: 7.2 X10(3) UL (ref 4–11)

## 2023-08-10 PROCEDURE — 80053 COMPREHEN METABOLIC PANEL: CPT | Performed by: EMERGENCY MEDICINE

## 2023-08-10 PROCEDURE — 71045 X-RAY EXAM CHEST 1 VIEW: CPT | Performed by: EMERGENCY MEDICINE

## 2023-08-10 PROCEDURE — 96375 TX/PRO/DX INJ NEW DRUG ADDON: CPT

## 2023-08-10 PROCEDURE — 94644 CONT INHLJ TX 1ST HOUR: CPT

## 2023-08-10 PROCEDURE — 93010 ELECTROCARDIOGRAM REPORT: CPT

## 2023-08-10 PROCEDURE — 85025 COMPLETE CBC W/AUTO DIFF WBC: CPT | Performed by: EMERGENCY MEDICINE

## 2023-08-10 PROCEDURE — 96365 THER/PROPH/DIAG IV INF INIT: CPT

## 2023-08-10 PROCEDURE — 83880 ASSAY OF NATRIURETIC PEPTIDE: CPT | Performed by: EMERGENCY MEDICINE

## 2023-08-10 PROCEDURE — 93005 ELECTROCARDIOGRAM TRACING: CPT

## 2023-08-10 PROCEDURE — 99285 EMERGENCY DEPT VISIT HI MDM: CPT

## 2023-08-10 PROCEDURE — 94645 CONT INHLJ TX EACH ADDL HOUR: CPT

## 2023-08-10 PROCEDURE — 84484 ASSAY OF TROPONIN QUANT: CPT | Performed by: EMERGENCY MEDICINE

## 2023-08-10 PROCEDURE — 85610 PROTHROMBIN TIME: CPT | Performed by: EMERGENCY MEDICINE

## 2023-08-10 RX ORDER — DIPHENHYDRAMINE HYDROCHLORIDE 50 MG/ML
12.5 INJECTION INTRAMUSCULAR; INTRAVENOUS ONCE
Status: COMPLETED | OUTPATIENT
Start: 2023-08-10 | End: 2023-08-10

## 2023-08-10 RX ORDER — METHYLPREDNISOLONE SODIUM SUCCINATE 125 MG/2ML
60 INJECTION, POWDER, LYOPHILIZED, FOR SOLUTION INTRAMUSCULAR; INTRAVENOUS ONCE
Status: COMPLETED | OUTPATIENT
Start: 2023-08-10 | End: 2023-08-10

## 2023-08-10 RX ORDER — METHYLPREDNISOLONE SODIUM SUCCINATE 125 MG/2ML
125 INJECTION, POWDER, LYOPHILIZED, FOR SOLUTION INTRAMUSCULAR; INTRAVENOUS ONCE
Status: DISCONTINUED | OUTPATIENT
Start: 2023-08-10 | End: 2023-08-10

## 2023-08-10 RX ORDER — ALBUTEROL SULFATE 90 UG/1
2 AEROSOL, METERED RESPIRATORY (INHALATION) EVERY 4 HOURS PRN
Qty: 1 EACH | Refills: 0 | Status: SHIPPED | OUTPATIENT
Start: 2023-08-10 | End: 2023-09-09

## 2023-08-10 RX ORDER — MAGNESIUM SULFATE HEPTAHYDRATE 40 MG/ML
2 INJECTION, SOLUTION INTRAVENOUS ONCE
Status: COMPLETED | OUTPATIENT
Start: 2023-08-10 | End: 2023-08-10

## 2023-08-10 RX ORDER — PREDNISONE 20 MG/1
40 TABLET ORAL DAILY
Qty: 10 TABLET | Refills: 0 | Status: SHIPPED | OUTPATIENT
Start: 2023-08-10 | End: 2023-08-15

## 2023-08-10 NOTE — ED QUICK NOTES
MD cleared patient for discharge. Patient provided with discharge paperwork and RN discussed plan of care. Patient given opportunity to ask any questions. MD prescribed 3 medications. Patient was in no apparent distress. Patient instructed to follow up with PCP. Patient ambulated out of ED with steady gait.

## 2023-08-10 NOTE — ED INITIAL ASSESSMENT (HPI)
Pt presents to ED for cough and congestion that started Monday. Denies fevers. Pt states she was seen by her doctor yesterday and started amoxicillin for a sinus infection. Pt now reports difficulty breathing.

## 2023-08-10 NOTE — ED QUICK NOTES
Patient ambulated around pod connected to pulse oximeter with PCT. SpO2 was maintained, but did fall to 94%. Patient did not complain of shortness of breath during ambulation.  Will notify MD

## 2023-08-10 NOTE — DISCHARGE INSTRUCTIONS
Return to emergency department for any worsening symptoms including but not limited to worsening shortness of breath, chest pain, symptoms with exertion, lower extremity swelling, weakness, numbness, abdominal pain, etc. Please follow with your primary care provider in the next few days for reevaluation of your symptoms. The Emergency Department is not intended to be a substitute for an effort to provide complete medical care. The imaging, if any, have often been interpreted on a preliminary basis pending final reading by the radiologist. If your blood pressure was greater than 140/90, please have this blood pressure rechecked by your primary care provider in the next several days.     You did not wish for admission today for

## 2023-09-03 DIAGNOSIS — R09.82 POSTNASAL DRIP: ICD-10-CM

## 2023-09-04 NOTE — TELEPHONE ENCOUNTER
Last ref 8/9/2023 by Dr Robinson Martin. pls advise, thanks in advance.        Please review refill protocol failed/ no protocol  Requested Prescriptions   Pending Prescriptions Disp Refills    SALINE MIST SPRAY 0.65 % Nasal Solution [Pharmacy Med Name: SALINE MIST 0.65% NOSE SPRY]  0     Sig: USE 1 SPRAY BY NASAL ROUTE AS NEEDED       There is no refill protocol information for this order

## 2023-09-05 RX ORDER — ECHINACEA PURPUREA EXTRACT 125 MG
1 TABLET ORAL AS NEEDED
Qty: 60 ML | Refills: 0 | Status: SHIPPED | OUTPATIENT
Start: 2023-09-05

## 2023-09-25 ENCOUNTER — TELEPHONE (OUTPATIENT)
Dept: FAMILY MEDICINE CLINIC | Facility: CLINIC | Age: 58
End: 2023-09-25

## 2023-09-25 ENCOUNTER — OFFICE VISIT (OUTPATIENT)
Dept: FAMILY MEDICINE CLINIC | Facility: CLINIC | Age: 58
End: 2023-09-25

## 2023-09-25 ENCOUNTER — LAB ENCOUNTER (OUTPATIENT)
Dept: LAB | Age: 58
End: 2023-09-25
Attending: FAMILY MEDICINE
Payer: COMMERCIAL

## 2023-09-25 ENCOUNTER — NURSE TRIAGE (OUTPATIENT)
Dept: FAMILY MEDICINE CLINIC | Facility: CLINIC | Age: 58
End: 2023-09-25

## 2023-09-25 VITALS
RESPIRATION RATE: 16 BRPM | TEMPERATURE: 98 F | BODY MASS INDEX: 40.82 KG/M2 | DIASTOLIC BLOOD PRESSURE: 78 MMHG | HEIGHT: 65 IN | SYSTOLIC BLOOD PRESSURE: 126 MMHG | WEIGHT: 245 LBS | HEART RATE: 78 BPM

## 2023-09-25 DIAGNOSIS — J98.01 BRONCHOSPASM: ICD-10-CM

## 2023-09-25 DIAGNOSIS — J98.01 BRONCHOSPASM: Primary | ICD-10-CM

## 2023-09-25 PROCEDURE — 3074F SYST BP LT 130 MM HG: CPT | Performed by: FAMILY MEDICINE

## 2023-09-25 PROCEDURE — 3078F DIAST BP <80 MM HG: CPT | Performed by: FAMILY MEDICINE

## 2023-09-25 PROCEDURE — 99213 OFFICE O/P EST LOW 20 MIN: CPT | Performed by: FAMILY MEDICINE

## 2023-09-25 PROCEDURE — 36415 COLL VENOUS BLD VENIPUNCTURE: CPT

## 2023-09-25 PROCEDURE — 86480 TB TEST CELL IMMUN MEASURE: CPT

## 2023-09-25 PROCEDURE — 3008F BODY MASS INDEX DOCD: CPT | Performed by: FAMILY MEDICINE

## 2023-09-25 RX ORDER — ALBUTEROL SULFATE 90 UG/1
2 AEROSOL, METERED RESPIRATORY (INHALATION) EVERY 4 HOURS PRN
Qty: 1 EACH | Refills: 0 | Status: SHIPPED | OUTPATIENT
Start: 2023-09-25 | End: 2023-10-25

## 2023-09-25 RX ORDER — ALIROCUMAB 75 MG/ML
INJECTION, SOLUTION SUBCUTANEOUS
COMMUNITY

## 2023-09-25 RX ORDER — MULTIVIT WITH MINERALS/LUTEIN
TABLET ORAL
COMMUNITY
Start: 2022-08-04

## 2023-09-25 RX ORDER — FLUTICASONE PROPIONATE 50 MCG
2 SPRAY, SUSPENSION (ML) NASAL DAILY
COMMUNITY
Start: 2023-09-21

## 2023-09-25 RX ORDER — COVID-19 ANTIGEN TEST
1 KIT MISCELLANEOUS AS DIRECTED
COMMUNITY
Start: 2023-04-12

## 2023-09-25 RX ORDER — EVOLOCUMAB 140 MG/ML
INJECTION, SOLUTION SUBCUTANEOUS
COMMUNITY
Start: 2023-07-12

## 2023-09-25 RX ORDER — PREDNISONE 20 MG/1
TABLET ORAL
Qty: 10 TABLET | Refills: 0 | Status: SHIPPED | OUTPATIENT
Start: 2023-09-25

## 2023-09-25 NOTE — TELEPHONE ENCOUNTER
Patient work Px form was given to Joshua Sethi , she placed the form in her yellow folder located on her desk.

## 2023-09-25 NOTE — TELEPHONE ENCOUNTER
Pt stated that she was in the ER on 8/10/2023. Pt does feel better then when she went to ER but she still has some wheezing and coughing. The wheezing is more at night. Pt denied any chest pain. Pt does feel some slight sob. Pt will like to be seen today by Dr. Roel Heard. Pt was inform Dr. Roel Heard does not have any opening today but  does. Pt agreed to see Dr. Hali Almeida but pt needs a appt after 3 pm due to her work. Pt was given a appt for today at 3:30.      Future Appointments   Date Time Provider Cony Johnna   9/25/2023  3:30 PM MD Elier Hood 2       Reason for Disposition   Wheezing is present    Protocols used: Cough-A-OH

## 2023-09-25 NOTE — PROGRESS NOTES
Subjective:   Patient ID: Param Scott is a 59year old female. Pt presents for follow up from the ER a month ago. Was diagnosed with bronchospasm. Patient was treated with steroids and also has an albuterol MDI/ flonase. Patient states symptoms are better but still lingering wheezing. Requesting refill on her inhaler. Pt has no prior hx of asthma. No fevers or sig SOB. Pt also requesting TB testing for job she is applying for. Has physical with Dr Jennifer Holman already earlier in the year. History/Other:   Review of Systems   Constitutional:  Negative for fever. HENT:  Negative for congestion, postnasal drip, rhinorrhea, sinus pain and sore throat. Respiratory:  Positive for wheezing. Negative for cough, chest tightness and shortness of breath. Cardiovascular:  Negative for chest pain. Psychiatric/Behavioral:  Negative for dysphoric mood. The patient is not nervous/anxious. Current Outpatient Medications   Medication Sig Dispense Refill    Ascorbic Acid (VITAMIN C) 1000 MG Oral Tab Vitamin C 1,000 mg tablet, [RxNorm: 302455]      fluticasone propionate 50 MCG/ACT Nasal Suspension 2 sprays by Nasal route daily. QUICKVUE AT-HOME COVID-19 TEST In Vitro Kit Take 1 Bottle by mouth As Directed. REPATHA SURECLICK 173 MG/ML Subcutaneous Solution Auto-injector Repatha SureClick 603 mg/mL subcutaneous pen injector, [RxNorm: 8758454]      PRALUENT 75 MG/ML Subcutaneous Solution Auto-injector PEN INJECTOR (SUBCUTANEOUS) ONCE EVERY 15 DAYS, INJECT SUBCUTANEOUS      predniSONE 20 MG Oral Tab To take 2 tablets by oral route for 5 days 10 tablet 0    albuterol 108 (90 Base) MCG/ACT Inhalation Aero Soln Inhale 2 puffs into the lungs every 4 (four) hours as needed for Wheezing. 1 each 0    sodium chloride (SALINE MIST SPRAY) 0.65 % Nasal Solution 1 spray by Nasal route as needed. 60 mL 0    Potassium Chloride ER 20 MEQ Oral Tab CR Take 1 tablet by mouth daily.  90 tablet 3    Losartan Potassium-HCTZ 100-12.5 MG Oral Tab Take 1 tablet by mouth daily. 90 tablet 3    amLODIPine 10 MG Oral Tab Take 1 tablet (10 mg total) by mouth daily. 90 tablet 3    metoprolol succinate ER 25 MG Oral Tablet 24 Hr Take 1 tablet (25 mg total) by mouth daily. 90 tablet 3    Calcium Carb-Cholecalciferol 600-400 MG-UNIT Oral Tab Take 1 tablet by mouth daily. Multiple Vitamin (MULTI-VITAMIN DAILY) Oral Tab Take by mouth.      vitamin B-12 50 MCG Oral Tab Take 1 tablet (50 mcg total) by mouth daily. Ascorbic Acid (VITAMIN C OR) Take by mouth. Allergies:  Atorvastatin            MYALGIA  Pravastatin             MYALGIA  Rosuvastatin            MYALGIA  Seasonal                Runny nose    Objective:   Physical Exam  Vitals reviewed. Constitutional:       Appearance: Normal appearance. She is well-developed. HENT:      Right Ear: Tympanic membrane and ear canal normal.      Left Ear: Tympanic membrane and ear canal normal.      Mouth/Throat:      Mouth: Mucous membranes are moist.      Pharynx: No oropharyngeal exudate or posterior oropharyngeal erythema. Cardiovascular:      Rate and Rhythm: Normal rate and regular rhythm. Heart sounds: Normal heart sounds. Pulmonary:      Effort: Pulmonary effort is normal. No respiratory distress. Breath sounds: Normal breath sounds. No wheezing or rales. Musculoskeletal:      Cervical back: Normal range of motion and neck supple. Lymphadenopathy:      Cervical: No cervical adenopathy. Neurological:      Mental Status: She is alert. Assessment & Plan:   Bronchospasm:  - After discussion with patient, medrol dose pack as directed; albuterol MDI as needed for wheezing - renewed: To call if worse or not better; Follow up in one week if not resolved or as needed if worse. Consider follow up with pulmonary if not better. Also ordered TB blood testing as requested. Form given to Dr Oconnell Reason to fill out when results are available.        Orders Placed This Encounter      TB INTRADERMAL TEST      Quantiferon TB Plus      Meds This Visit:  Requested Prescriptions     Signed Prescriptions Disp Refills    predniSONE 20 MG Oral Tab 10 tablet 0     Sig: To take 2 tablets by oral route for 5 days    albuterol 108 (90 Base) MCG/ACT Inhalation Aero Soln 1 each 0     Sig: Inhale 2 puffs into the lungs every 4 (four) hours as needed for Wheezing.        Imaging & Referrals:  PULMONARY - INTERNAL

## 2023-09-26 ENCOUNTER — TELEPHONE (OUTPATIENT)
Dept: FAMILY MEDICINE CLINIC | Facility: CLINIC | Age: 58
End: 2023-09-26

## 2023-09-26 NOTE — TELEPHONE ENCOUNTER
Pharmacy:  Alternative requested, not covered by insurance       albuterol 108 (90 Base) MCG/ACT Inhalation Aero Soln, Inhale 2 puffs into the lungs every 4 (four) hours as needed for Wheezing., Disp: 1 each, Rfl: 0

## 2023-09-27 LAB
M TB IFN-G CD4+ T-CELLS BLD-ACNC: 0.03 IU/ML
M TB TUBERC IFN-G BLD QL: NEGATIVE
M TB TUBERC IGNF/MITOGEN IGNF CONTROL: >10 IU/ML
QFT TB1 AG MINUS NIL: 0.02 IU/ML
QFT TB2 AG MINUS NIL: 0.02 IU/ML

## 2023-10-06 ENCOUNTER — TELEPHONE (OUTPATIENT)
Dept: FAMILY MEDICINE CLINIC | Facility: CLINIC | Age: 58
End: 2023-10-06

## 2023-10-06 NOTE — TELEPHONE ENCOUNTER
Per pt and per chart pt in ER 8/10/23 for upper resp symptoms. Pt reports the symptoms did not subside and had OV with Dr Kelly Gonzalez 9/25/23. Pt was given Prednisone, albuterol and pt states still no improvement in symptoms. Noted that pt was given referral to pulmonary and pt states she  has not scheduled yet. Pt denies SOB, chest pain, weakness, fever. Pt asking if Dr Melva Heard can fit her in tomorrow 10/7/23 for evaluation. Per pt \"If she can't see me can she prescribe me something else\"    Please advise. Pt aware Dr Melva Heard not available today and will respond when she returns. Pt advised to call pulmonary to schedule and pt advised ER if symptoms worsen.

## 2023-10-07 NOTE — TELEPHONE ENCOUNTER
Called patient (name and  verified) and instructed on providers message below. Patient verbalized understanding and agrees to plan.       Future Appointments   Date Time Provider Cony Oropeza   10/9/2023  3:45 PM Chastity Avendano MD [facility]

## 2023-10-07 NOTE — TELEPHONE ENCOUNTER
Can see her during the week. , can use res 24   If worsening breathing or wheezing, despite albuterol/ prednisone to go to ER

## 2023-10-09 ENCOUNTER — OFFICE VISIT (OUTPATIENT)
Dept: FAMILY MEDICINE CLINIC | Facility: CLINIC | Age: 58
End: 2023-10-09

## 2023-10-09 VITALS
HEART RATE: 68 BPM | SYSTOLIC BLOOD PRESSURE: 142 MMHG | WEIGHT: 248 LBS | BODY MASS INDEX: 41.32 KG/M2 | DIASTOLIC BLOOD PRESSURE: 84 MMHG | TEMPERATURE: 98 F | HEIGHT: 65 IN

## 2023-10-09 DIAGNOSIS — R05.1 ACUTE COUGH: ICD-10-CM

## 2023-10-09 DIAGNOSIS — R09.81 NASAL CONGESTION: Primary | ICD-10-CM

## 2023-10-09 DIAGNOSIS — J01.90 SUBACUTE SINUSITIS, UNSPECIFIED LOCATION: ICD-10-CM

## 2023-10-09 DIAGNOSIS — Z12.11 COLON CANCER SCREENING: ICD-10-CM

## 2023-10-09 DIAGNOSIS — E11.9 CONTROLLED TYPE 2 DIABETES MELLITUS WITHOUT COMPLICATION, WITHOUT LONG-TERM CURRENT USE OF INSULIN (HCC): ICD-10-CM

## 2023-10-09 DIAGNOSIS — E78.00 HYPERCHOLESTEREMIA: ICD-10-CM

## 2023-10-09 RX ORDER — AMOXICILLIN 875 MG/1
875 TABLET, COATED ORAL 2 TIMES DAILY
Qty: 20 TABLET | Refills: 0 | Status: SHIPPED | OUTPATIENT
Start: 2023-10-09 | End: 2023-10-19

## 2023-10-09 RX ORDER — GUAIFENESIN AND CODEINE PHOSPHATE 100; 10 MG/5ML; MG/5ML
5 SOLUTION ORAL NIGHTLY PRN
Qty: 118 ML | Refills: 0 | Status: SHIPPED | OUTPATIENT
Start: 2023-10-09 | End: 2023-10-23

## 2023-10-23 RX ORDER — ALBUTEROL SULFATE 90 UG/1
2 AEROSOL, METERED RESPIRATORY (INHALATION) EVERY 4 HOURS PRN
Qty: 3 EACH | Refills: 3 | Status: SHIPPED | OUTPATIENT
Start: 2023-10-23

## 2023-11-13 ENCOUNTER — LAB ENCOUNTER (OUTPATIENT)
Dept: LAB | Age: 58
End: 2023-11-13
Attending: FAMILY MEDICINE
Payer: COMMERCIAL

## 2023-11-13 DIAGNOSIS — E11.9 CONTROLLED TYPE 2 DIABETES MELLITUS WITHOUT COMPLICATION, WITHOUT LONG-TERM CURRENT USE OF INSULIN (HCC): ICD-10-CM

## 2023-11-13 DIAGNOSIS — E78.00 HYPERCHOLESTEREMIA: ICD-10-CM

## 2023-11-13 LAB
ALBUMIN SERPL-MCNC: 4.3 G/DL (ref 3.2–4.8)
ALBUMIN/GLOB SERPL: 1.5 {RATIO} (ref 1–2)
ALP LIVER SERPL-CCNC: 90 U/L
ALT SERPL-CCNC: 16 U/L
ANION GAP SERPL CALC-SCNC: 10 MMOL/L (ref 0–18)
AST SERPL-CCNC: 17 U/L (ref ?–34)
BILIRUB SERPL-MCNC: 0.5 MG/DL (ref 0.2–1.1)
BUN BLD-MCNC: 15 MG/DL (ref 9–23)
BUN/CREAT SERPL: 14.6 (ref 10–20)
CALCIUM BLD-MCNC: 9.4 MG/DL (ref 8.7–10.4)
CHLORIDE SERPL-SCNC: 105 MMOL/L (ref 98–112)
CHOLEST SERPL-MCNC: 214 MG/DL (ref ?–200)
CO2 SERPL-SCNC: 26 MMOL/L (ref 21–32)
CREAT BLD-MCNC: 1.03 MG/DL
CREAT UR-SCNC: 166.5 MG/DL
EGFRCR SERPLBLD CKD-EPI 2021: 62 ML/MIN/1.73M2 (ref 60–?)
EST. AVERAGE GLUCOSE BLD GHB EST-MCNC: 131 MG/DL (ref 68–126)
FASTING PATIENT LIPID ANSWER: YES
FASTING STATUS PATIENT QL REPORTED: YES
GLOBULIN PLAS-MCNC: 2.8 G/DL (ref 2.8–4.4)
GLUCOSE BLD-MCNC: 94 MG/DL (ref 70–99)
HBA1C MFR BLD: 6.2 % (ref ?–5.7)
HDLC SERPL-MCNC: 50 MG/DL (ref 40–59)
LDLC SERPL CALC-MCNC: 152 MG/DL (ref ?–100)
MICROALBUMIN UR-MCNC: 7.7 MG/DL
MICROALBUMIN/CREAT 24H UR-RTO: 46.2 UG/MG (ref ?–30)
NONHDLC SERPL-MCNC: 164 MG/DL (ref ?–130)
OSMOLALITY SERPL CALC.SUM OF ELEC: 293 MOSM/KG (ref 275–295)
POTASSIUM SERPL-SCNC: 3.4 MMOL/L (ref 3.5–5.1)
PROT SERPL-MCNC: 7.1 G/DL (ref 5.7–8.2)
SODIUM SERPL-SCNC: 141 MMOL/L (ref 136–145)
TRIGL SERPL-MCNC: 65 MG/DL (ref 30–149)
VLDLC SERPL CALC-MCNC: 12 MG/DL (ref 0–30)

## 2023-11-13 PROCEDURE — 83036 HEMOGLOBIN GLYCOSYLATED A1C: CPT

## 2023-11-13 PROCEDURE — 82570 ASSAY OF URINE CREATININE: CPT

## 2023-11-13 PROCEDURE — 82043 UR ALBUMIN QUANTITATIVE: CPT

## 2023-11-13 PROCEDURE — 36415 COLL VENOUS BLD VENIPUNCTURE: CPT

## 2023-11-13 PROCEDURE — 80061 LIPID PANEL: CPT

## 2023-11-13 PROCEDURE — 3060F POS MICROALBUMINURIA REV: CPT | Performed by: FAMILY MEDICINE

## 2023-11-13 PROCEDURE — 3061F NEG MICROALBUMINURIA REV: CPT | Performed by: FAMILY MEDICINE

## 2023-11-13 PROCEDURE — 3044F HG A1C LEVEL LT 7.0%: CPT | Performed by: FAMILY MEDICINE

## 2023-11-13 PROCEDURE — 80053 COMPREHEN METABOLIC PANEL: CPT

## 2023-11-24 ENCOUNTER — TELEPHONE (OUTPATIENT)
Dept: FAMILY MEDICINE CLINIC | Facility: CLINIC | Age: 58
End: 2023-11-24

## 2023-12-06 ENCOUNTER — OFFICE VISIT (OUTPATIENT)
Dept: FAMILY MEDICINE CLINIC | Facility: CLINIC | Age: 58
End: 2023-12-06
Payer: COMMERCIAL

## 2023-12-06 VITALS
BODY MASS INDEX: 41.48 KG/M2 | SYSTOLIC BLOOD PRESSURE: 149 MMHG | WEIGHT: 249 LBS | TEMPERATURE: 98 F | HEIGHT: 65 IN | DIASTOLIC BLOOD PRESSURE: 93 MMHG | HEART RATE: 76 BPM

## 2023-12-06 DIAGNOSIS — E87.6 HYPOKALEMIA: ICD-10-CM

## 2023-12-06 DIAGNOSIS — E11.9 CONTROLLED TYPE 2 DIABETES MELLITUS WITHOUT COMPLICATION, WITHOUT LONG-TERM CURRENT USE OF INSULIN (HCC): ICD-10-CM

## 2023-12-06 DIAGNOSIS — I10 ESSENTIAL HYPERTENSION: ICD-10-CM

## 2023-12-06 DIAGNOSIS — E78.00 HYPERCHOLESTEREMIA: Primary | ICD-10-CM

## 2023-12-06 PROCEDURE — 99214 OFFICE O/P EST MOD 30 MIN: CPT | Performed by: FAMILY MEDICINE

## 2023-12-06 PROCEDURE — 3008F BODY MASS INDEX DOCD: CPT | Performed by: FAMILY MEDICINE

## 2023-12-06 PROCEDURE — 3077F SYST BP >= 140 MM HG: CPT | Performed by: FAMILY MEDICINE

## 2023-12-06 PROCEDURE — 3080F DIAST BP >= 90 MM HG: CPT | Performed by: FAMILY MEDICINE

## 2023-12-06 RX ORDER — METOPROLOL SUCCINATE 25 MG/1
50 TABLET, EXTENDED RELEASE ORAL DAILY
Qty: 180 TABLET | Refills: 0 | Status: SHIPPED | OUTPATIENT
Start: 2023-12-06 | End: 2024-03-05

## 2024-01-18 ENCOUNTER — NURSE TRIAGE (OUTPATIENT)
Dept: FAMILY MEDICINE CLINIC | Facility: CLINIC | Age: 59
End: 2024-01-18

## 2024-01-18 ENCOUNTER — TELEMEDICINE (OUTPATIENT)
Dept: FAMILY MEDICINE CLINIC | Facility: CLINIC | Age: 59
End: 2024-01-18
Payer: COMMERCIAL

## 2024-01-18 DIAGNOSIS — J01.80 OTHER ACUTE SINUSITIS, RECURRENCE NOT SPECIFIED: Primary | ICD-10-CM

## 2024-01-18 PROCEDURE — 99213 OFFICE O/P EST LOW 20 MIN: CPT | Performed by: FAMILY MEDICINE

## 2024-01-18 RX ORDER — AMOXICILLIN 875 MG/1
875 TABLET, COATED ORAL 2 TIMES DAILY
Qty: 20 TABLET | Refills: 0 | Status: SHIPPED | OUTPATIENT
Start: 2024-01-18

## 2024-01-18 NOTE — TELEPHONE ENCOUNTER
Please reply to pool: EM RN TRIAGE  Action Requested: Summary for Provider     []  Critical Lab, Recommendations Needed  [] Need Additional Advice  [x]   FYI    []   Need Orders  [] Need Medications Sent to Pharmacy  []  Other     SUMMARY: Patient contacts clinic reporting she has been off work for 3 days due to sinus congestion, post nasal drip and wheezing.  States symptoms are improving but needs a note to return to work.  Denies fever, chest pain or shortness of breath.  Mild wheezing, using inhaler which helps.  Denies difficulty getting air or chest tightness.  Home covid test negative.  Virtual visit booked to discuss symptoms and document note for work.      Reason for call: Sinus Problem  Onset: Data Unavailable                       Reason for Disposition   Patient wants to be seen    Protocols used: Sinus Pain and Congestion-A-[place]

## 2024-01-18 NOTE — PROGRESS NOTES
Subjective:   Patient ID: Alisha Wilde is a 58 year old female.    Telehealth outside of [facility]  Telehealth Verbal Consent   I conducted a telehealth visit with Alisha Wilde today, 01/18/24, which was completed using two-way, real-time interactive audio and video communication. This has been done in good yeyo to provide continuity of care in the best interest of the provider-patient relationship, due to the COVID -19 public health crisis/national emergency where restrictions of face-to-face office visits are ongoing. Every conscious effort was taken to allow for sufficient and adequate time to complete the visit.  The patient was made aware of the limitations of the telehealth visit, including treatment limitations as no physical exam could be performed.  The patient was advised to call 911 or to go to the ER in case there was an emergency.  The patient was also advised of the potential privacy & security concerns related to the telehealth platform.   The patient was made aware of where to find [facility]'s notice of privacy practices, telehealth consent form and other related consent forms and documents.  which are located on the [facility] website. The patient verbally agreed to telehealth consent form, related consents and the risks discussed.    Lastly, the patient confirmed that they were in [place].   Included in this visit, time may have been spent reviewing labs, medications, radiology tests and decision making. Appropriate medical decision-making and tests are ordered as detailed in the plan of care above.  Coding/billing information is submitted for this visit based on complexity of care and/or time spent for the visit.  This is video visit  '        Has postnasal drip nasal congestion  Itchy eyes and nose symptoms not imporving almost a week          History/Other:   Review of Systems    Constitutional: Negative.  Negative for activity change, appetite change, diaphoresis and fatigue.   Heent see hpi    Respiratory: Negative.  Negative for apnea, cough, chest tightness and shortness of breath.    Cardiovascular: Negative.  Negative for chest pain, palpitations and leg swelling.     Current Outpatient Medications   Medication Sig Dispense Refill    amoxicillin 875 MG Oral Tab Take 1 tablet (875 mg total) by mouth 2 (two) times daily. 20 tablet 0    metoprolol succinate ER 25 MG Oral Tablet 24 Hr Take 2 tablets (50 mg total) by mouth daily. 180 tablet 0    albuterol 108 (90 Base) MCG/ACT Inhalation Aero Soln INHALE 2 PUFFS INTO THE LUNGS EVERY 4 HOURS AS NEEDED FOR WHEEZE 3 each 3    VITAMIN D OR Take by mouth.      Ascorbic Acid (VITAMIN C) 1000 MG Oral Tab Vitamin C 1,000 mg tablet, [RxNorm: 271655]      fluticasone propionate 50 MCG/ACT Nasal Suspension 2 sprays by Nasal route daily.      REPATHA SURECLICK 140 MG/ML Subcutaneous Solution Auto-injector Repatha SureClick 140 mg/mL subcutaneous pen injector, [RxNorm: 2262301] (Patient not taking: Reported on 12/6/2023)      PRALUENT 75 MG/ML Subcutaneous Solution Auto-injector PEN INJECTOR (SUBCUTANEOUS) ONCE EVERY 15 DAYS, INJECT SUBCUTANEOUS (Patient not taking: Reported on 12/6/2023)      sodium chloride (SALINE MIST SPRAY) 0.65 % Nasal Solution 1 spray by Nasal route as needed. 60 mL 0    Potassium Chloride ER 20 MEQ Oral Tab CR Take 1 tablet by mouth daily. 90 tablet 3    Losartan Potassium-HCTZ 100-12.5 MG Oral Tab Take 1 tablet by mouth daily. 90 tablet 3    amLODIPine 10 MG Oral Tab Take 1 tablet (10 mg total) by mouth daily. 90 tablet 3    Calcium Carb-Cholecalciferol 600-400 MG-UNIT Oral Tab Take 1 tablet by mouth daily.      vitamin B-12 50 MCG Oral Tab Take 1 tablet (50 mcg total) by mouth daily.       Allergies:  Allergies   Allergen Reactions    Atorvastatin MYALGIA    Pravastatin MYALGIA    Rosuvastatin MYALGIA    Seasonal Runny nose       Objective:   Physical Exam  Pt is breathing comfortable over the phone and speaking in full sentences without  shortness of breath    Assessment & Plan: sinusitis   Start amoxil   Signature Attestation Statement    Physician Name:   Aretha Jackson MD     Patient Name:  Alisha Wilde    Dates of Service:        Signature Attestation Statement    Physician Name:   Aretha Jackson MD     Patient Name:  Alisha Wilde    Dates of Service:  1/18/2024      I hereby attest that the medical record entry for the above date(s) of service accurately reflects signatures/notations that I made in my capacity as an MD when I treated/diagnosed the above listed Medicare beneficiary. I do hereby attest that this information is true, accurate and complete to the best of my knowledge and I understand that any falsification, omission, or concealment of material fact may subject me to administrative, civil, or criminal liability.        Aretha Jackson MD      01/18/24      Aretha Jackson MD  Signature Attestation Statement    Physician Name:   Aretha Jackson MD     Patient Name:  Alisha Wilde    Dates of Service:  1/18/2024      I hereby attest that the medical record entry for the above date(s) of service accurately reflects signatures/notations that I made in my capacity as an MD when I treated/diagnosed the above listed Medicare beneficiary. I do hereby attest that this information is true, accurate and complete to the best of my knowledge and I understand that any falsification, omission, or concealment of material fact may subject me to administrative, civil, or criminal liability.        Aretha Jackson MD      01/18/24 01/18/24'    No orders of the defined types were placed in this encounter.      Meds This Visit:  Requested Prescriptions     Signed Prescriptions Disp Refills    amoxicillin 875 MG Oral Tab 20 tablet 0     Sig: Take 1 tablet (875 mg total) by mouth 2 (two) times daily.       Imaging & Referrals:  None

## 2024-01-20 RX ORDER — METOPROLOL SUCCINATE 50 MG/1
50 TABLET, EXTENDED RELEASE ORAL DAILY
Qty: 90 TABLET | Refills: 3 | Status: SHIPPED | OUTPATIENT
Start: 2024-01-20

## 2024-01-20 NOTE — TELEPHONE ENCOUNTER
Patient calling to request metoprolol, but stated is looking to get 50 MG, as is cheaper than taking two 25 MG. Stated is completely out. Verified pharmacy.

## 2024-01-20 NOTE — TELEPHONE ENCOUNTER
Please review. Protocol failed or has no protocol.     Requested Prescriptions   Pending Prescriptions Disp Refills    metoprolol succinate ER 50 MG Oral Tablet 24 Hr 90 tablet 3     Sig: Take 1 tablet (50 mg total) by mouth daily.       Hypertensive Medications Protocol Failed - 1/20/2024 12:31 PM        Failed - Last BP reading less than 140/90     BP Readings from Last 1 Encounters:   12/06/23 (!) 149/93               Passed - In person appointment in the past 12 or next 3 months     Recent Outpatient Visits              2 days ago Other acute sinusitis, recurrence not specified    [facility] Aretha Jackson MD    Telemedicine    1 month ago [facility]    3 months ago [facility]    [facility] Visit    3 months ago Bronchospasm    [facility] Anselmo Rosales MD    Office Visit    5 months ago Postnasal drip    [facility] [address] [place] Micaela Myers MD    Office Visit          Future Appointments         Provider Department Appt Notes    In 1 month Dmtiri Herman DO [facility] Bronchospasm (policy informed)    In 4 months Vitaliy Mckeon MD [facility] diabetic ee               Passed - CMP or BMP in past 6 months     Recent Results (from the past 4392 hour(s))   Comp Metabolic Panel (14)    Collection Time: 11/13/23 12:41 PM   Result Value Ref Range    Glucose 94 70 - 99 mg/dL    Sodium 141 136 - 145 mmol/L    Potassium 3.4 (L) 3.5 - 5.1 mmol/L    Chloride 105 98 - 112 mmol/L    CO2 26.0 21.0 - 32.0 mmol/L    Anion Gap 10 0 - 18 mmol/L    BUN 15 9 - 23 mg/dL    Creatinine 1.03 (H) 0.55 - 1.02 mg/dL    BUN/CREA Ratio  14.6 10.0 - 20.0    Calcium, Total 9.4 8.7 - 10.4 mg/dL    Calculated Osmolality 293 275 - 295 mOsm/kg    eGFR-Cr 62 >=60 mL/min/1.73m2    ALT 16 10 - 49 U/L    AST 17 <=34 U/L    Alkaline Phosphatase 90 46 - 118 U/L    Bilirubin, Total 0.5 0.2 - 1.1 mg/dL    Total Protein 7.1 5.7 - 8.2 g/dL    Albumin 4.3 3.2 - 4.8 g/dL    Globulin  2.8 2.8 - 4.4 g/dL    A/G Ratio 1.5 1.0 - 2.0    Patient Fasting for CMP? Yes      *Note: Due to a large number of results and/or encounters for the requested time period, some results have not been displayed. A complete set of results can be found in Results Review.               Passed - In person appointment or virtual visit in the past 6 months     Recent Outpatient Visits              2 days ago Other acute sinusitis, recurrence not specified    [facility] Arehta Jackson MD    Telemedicine    1 month ago Hypercholesteremia    [facility] Bridger Avendano MD    Office Visit    3 months ago Nasal congestion    [facility] Bridger Avendano MD    Office Visit    3 months ago Bronchospasm    [facility] Anselmo Rosales MD    Office Visit    5 months ago Postnasal drip    [facility] [address], [place] Micaela Myers MD    Office Visit          Future Appointments         Provider Department Appt Notes    In 1 month Dmitri Herman DO [facility] Bronchospasm (policy informed)    In 4 months Vitaliy Mckeon MD [facility] diabetic ee               Passed - EGFRCR or GFRAA > 50     GFR Evaluation  EGFRCR: 62 , resulted on 11/13/2023               Recent Outpatient Visits              2 days ago Other acute sinusitis, recurrence not specified    [facility]  Aretha Gardner MD    Telemedicine    1 month ago Hypercholesteremia    [facility], [facility] Alexandre MD    Office Visit    3 months ago Nasal congestion    [facility], Bridger Alexandre MD    Office Visit    3 months ago Bronchospasm    [facility] Anselmo Rosales MD    Office Visit    5 months ago Postnasal drip    [facility] [address], [place] Micaela Myers MD    Office Visit            Future Appointments         Provider Department Appt Notes    In 1 month Dmitri Herman,  [facility] Bronchospasm (policy informed)    In 4 months Vitaliy Mckeon MD [facility] diabetic ee

## 2024-01-20 NOTE — TELEPHONE ENCOUNTER
Medication for 50 mg 1 tab daily routed for protocol.             metoprolol succinate ER 25 MG Oral Tablet 24 Hr 180 tablet 0 12/6/2023 3/5/2024   Sig:   Take 2 tablets (50 mg total) by mouth daily.

## 2024-02-22 ENCOUNTER — OFFICE VISIT (OUTPATIENT)
Dept: FAMILY MEDICINE CLINIC | Facility: CLINIC | Age: 59
End: 2024-02-22
Payer: COMMERCIAL

## 2024-02-22 VITALS
BODY MASS INDEX: 41.48 KG/M2 | HEART RATE: 75 BPM | TEMPERATURE: 97 F | SYSTOLIC BLOOD PRESSURE: 128 MMHG | DIASTOLIC BLOOD PRESSURE: 80 MMHG | WEIGHT: 249 LBS | HEIGHT: 65 IN

## 2024-02-22 DIAGNOSIS — E11.9 CONTROLLED TYPE 2 DIABETES MELLITUS WITHOUT COMPLICATION, WITHOUT LONG-TERM CURRENT USE OF INSULIN (HCC): ICD-10-CM

## 2024-02-22 DIAGNOSIS — I10 ESSENTIAL HYPERTENSION: Primary | ICD-10-CM

## 2024-02-22 DIAGNOSIS — T46.6X5A MYALGIA DUE TO STATIN: ICD-10-CM

## 2024-02-22 DIAGNOSIS — R06.2 WHEEZING: ICD-10-CM

## 2024-02-22 DIAGNOSIS — Z00.00 WELL ADULT EXAM: ICD-10-CM

## 2024-02-22 DIAGNOSIS — E78.00 HYPERCHOLESTEREMIA: ICD-10-CM

## 2024-02-22 DIAGNOSIS — M79.10 MYALGIA DUE TO STATIN: ICD-10-CM

## 2024-02-22 PROCEDURE — 99214 OFFICE O/P EST MOD 30 MIN: CPT | Performed by: FAMILY MEDICINE

## 2024-02-22 RX ORDER — MOMETASONE FUROATE AND FORMOTEROL FUMARATE DIHYDRATE 200; 5 UG/1; UG/1
1 AEROSOL RESPIRATORY (INHALATION) 2 TIMES DAILY
Qty: 1 EACH | Refills: 1 | Status: SHIPPED | OUTPATIENT
Start: 2024-02-22

## 2024-02-22 NOTE — PROGRESS NOTES
HPI:    Patient ID: Alisha Wilde is a 58 year old female.      Blood Pressure  Pertinent negatives include no chest pain, chills, coughing, fever, headaches, numbness or weakness.       Chief Complaint   Patient presents with    Blood Pressure     Follow up          Wt Readings from Last 6 Encounters:   02/22/24 249 lb (112.9 kg)   12/06/23 249 lb (112.9 kg)   10/09/23 248 lb (112.5 kg)   09/25/23 245 lb (111.1 kg)   08/10/23 246 lb 4.1 oz (111.7 kg)   08/09/23 246 lb 4.8 oz (111.7 kg)     BP Readings from Last 3 Encounters:   02/22/24 128/80   12/06/23 (!) 149/93   10/09/23 142/84     Patient here for follow-up.  She has not been taking Repatha for her cholesterol.  She has had intolerance to oral statins    Review of Systems   Constitutional:  Negative for chills and fever.   Eyes:  Negative for visual disturbance.   Respiratory:  Positive for chest tightness and wheezing. Negative for cough and shortness of breath.         Has noticed some chest tightness and wheezing in the last 2 weeks.  She had a audio recording of her wheezing.  No wheezing on exam today   Cardiovascular:  Negative for chest pain, palpitations and leg swelling.   Genitourinary:  Negative for decreased urine volume and difficulty urinating.   Neurological:  Negative for dizziness, tremors, seizures, weakness, light-headedness, numbness and headaches.       /80   Pulse 75   Temp 96.8 °F (36 °C) (Temporal)   Ht 5' 5\" (1.651 m)   Wt 249 lb (112.9 kg)   BMI 41.44 kg/m²          Current Outpatient Medications   Medication Sig Dispense Refill    Mometasone Furo-Formoterol Fum (DULERA) 200-5 MCG/ACT Inhalation Aerosol Inhale 1 puff into the lungs 2 (two) times daily. 1 each 1    metoprolol succinate ER 50 MG Oral Tablet 24 Hr Take 1 tablet (50 mg total) by mouth daily. 90 tablet 3    albuterol 108 (90 Base) MCG/ACT Inhalation Aero Soln INHALE 2 PUFFS INTO THE LUNGS EVERY 4 HOURS AS NEEDED FOR WHEEZE 3 each 3    VITAMIN D OR Take by mouth.       Ascorbic Acid (VITAMIN C) 1000 MG Oral Tab Vitamin C 1,000 mg tablet, [RxNorm: 203418]      fluticasone propionate 50 MCG/ACT Nasal Suspension 2 sprays by Nasal route daily.      sodium chloride (SALINE MIST SPRAY) 0.65 % Nasal Solution 1 spray by Nasal route as needed. 60 mL 0    Potassium Chloride ER 20 MEQ Oral Tab CR Take 1 tablet by mouth daily. 90 tablet 3    Losartan Potassium-HCTZ 100-12.5 MG Oral Tab Take 1 tablet by mouth daily. 90 tablet 3    amLODIPine 10 MG Oral Tab Take 1 tablet (10 mg total) by mouth daily. 90 tablet 3    vitamin B-12 50 MCG Oral Tab Take 1 tablet (50 mcg total) by mouth daily.      amoxicillin 875 MG Oral Tab Take 1 tablet (875 mg total) by mouth 2 (two) times daily. (Patient not taking: Reported on 2/22/2024) 20 tablet 0    REPATHA SURECLICK 140 MG/ML Subcutaneous Solution Auto-injector Repatha SureClick 140 mg/mL subcutaneous pen injector, [RxNorm: 5297818] (Patient not taking: Reported on 12/6/2023)      PRALUENT 75 MG/ML Subcutaneous Solution Auto-injector PEN INJECTOR (SUBCUTANEOUS) ONCE EVERY 15 DAYS, INJECT SUBCUTANEOUS (Patient not taking: Reported on 12/6/2023)       Allergies:  Allergies   Allergen Reactions    Atorvastatin MYALGIA    Pravastatin MYALGIA    Rosuvastatin MYALGIA    Seasonal Runny nose      PHYSICAL EXAM:     Chief Complaint   Patient presents with    Blood Pressure     Follow up         Physical Exam  Vitals and nursing note reviewed.   Constitutional:       Appearance: She is well-developed.   Eyes:      Pupils: Pupils are equal, round, and reactive to light.   Neck:      Thyroid: No thyromegaly.   Cardiovascular:      Rate and Rhythm: Normal rate and regular rhythm.      Heart sounds: No murmur heard.  Pulmonary:      Effort: Pulmonary effort is normal.      Breath sounds: Normal breath sounds. No wheezing.   Abdominal:      Palpations: There is no mass.      Tenderness: There is no abdominal tenderness. There is no right CVA tenderness or left CVA  tenderness.   Musculoskeletal:      Cervical back: Normal range of motion and neck supple.      Right lower leg: No edema.      Left lower leg: No edema.   Skin:     General: Skin is warm and dry.      Findings: No rash.   Neurological:      Mental Status: She is alert and oriented to person, place, and time.                ASSESSMENT/PLAN:     Encounter Diagnoses   Name Primary?    Essential hypertension Yes    Hypercholesteremia     Controlled type 2 diabetes mellitus without complication, without long-term current use of insulin ([facility])     Well adult exam     Wheezing     Myalgia due to statin        1. Essential hypertension  Stable condition  Reviewed medications  Continue current medication management   All questions answered to the best of my ability.      2. Hypercholesteremia  Check labs  Resume repatha  Recommend she follow-up with cardiology.  Maintain a low-fat low-cholesterol diet.  Reviewed importance of lowering cholesterol due to increased risk of stroke and heart disease    3. Controlled type 2 diabetes mellitus without complication, without long-term current use of insulin (HCC)  Stable condition  Reviewed medications  Continue current medication management   All questions answered to the best of my ability.    - Hemoglobin A1C; Future    4. Well adult exam  Rtc 1 month physical  - CBC With Differential With Platelet; Future  - Comp Metabolic Panel (14); Future  - Lipid Panel; Future  - TSH W Reflex To Free T4; Future    5. Wheezing  Patient mentions having wheezing for the last 2 weeks.  No wheezing on exam today.  Recommend adding Dulera to routine to use as maintenance.  Follow-up for physical next month.  Follow-up sooner if symptoms worsening  - Mometasone Furo-Formoterol Fum (DULERA) 200-5 MCG/ACT Inhalation Aerosol; Inhale 1 puff into the lungs 2 (two) times daily.  Dispense: 1 each; Refill: 1    6. Myalgia due to statin        Orders Placed This Encounter   Procedures    CBC With  Differential With Platelet    Comp Metabolic Panel (14)    Lipid Panel    TSH W Reflex To Free T4    Hemoglobin A1C         The above note was creating using Dragon speech recognition technology. Please excuse any typos    Meds This Visit:  Requested Prescriptions     Signed Prescriptions Disp Refills    Mometasone Furo-Formoterol Fum (DULERA) 200-5 MCG/ACT Inhalation Aerosol 1 each 1     Sig: Inhale 1 puff into the lungs 2 (two) times daily.       Imaging & Referrals:  None       ID#[id number]

## 2024-03-14 ENCOUNTER — TELEPHONE (OUTPATIENT)
Dept: FAMILY MEDICINE CLINIC | Facility: CLINIC | Age: 59
End: 2024-03-14

## 2024-03-14 DIAGNOSIS — R06.2 WHEEZING: Primary | ICD-10-CM

## 2024-03-14 DIAGNOSIS — R07.89 CHEST TIGHTNESS: ICD-10-CM

## 2024-03-14 RX ORDER — FLUTICASONE PROPIONATE AND SALMETEROL 250; 50 UG/1; UG/1
1 POWDER RESPIRATORY (INHALATION) EVERY 12 HOURS SCHEDULED
Qty: 1 EACH | Refills: 0 | Status: SHIPPED | OUTPATIENT
Start: 2024-03-14

## 2024-03-14 NOTE — TELEPHONE ENCOUNTER
Patient called, verified Name and . States she was seen by PCP on  and was prescribed inhaler. She tried picking it up from pharmacy but medication cost is $125. She wants to know if there is a different inhaler she can take that is not as expensive.     Dr. Avendano please advise.

## 2024-03-14 NOTE — TELEPHONE ENCOUNTER
Left message to call back.    Spoke Alison AGEE she will add addendum to message below    Did state it is Dulera

## 2024-03-14 NOTE — TELEPHONE ENCOUNTER
Spoke to patient (verified Name and ) and relayed Dr. Avendano's message below. Patient verbalized understanding and had no further questions at this time.

## 2024-03-14 NOTE — TELEPHONE ENCOUNTER
Dr. Avendano, medication is Dulera. I confirmed this with the patient during initial phone call. Did not realize I did not include it in my note.

## 2024-04-10 ENCOUNTER — TELEPHONE (OUTPATIENT)
Dept: FAMILY MEDICINE CLINIC | Facility: CLINIC | Age: 59
End: 2024-04-10

## 2024-04-10 DIAGNOSIS — I10 ESSENTIAL HYPERTENSION: ICD-10-CM

## 2024-04-10 DIAGNOSIS — R06.2 WHEEZING: ICD-10-CM

## 2024-04-10 DIAGNOSIS — R07.89 CHEST TIGHTNESS: ICD-10-CM

## 2024-04-10 NOTE — TELEPHONE ENCOUNTER
Please Review. Protocol Failed; No Protocol     Requested Prescriptions   Pending Prescriptions Disp Refills    FLUTICASONE-SALMETEROL 250-50 MCG/ACT Inhalation Aerosol Powder, Breath Activated [Pharmacy Med Name: FLUTICASONE-SALMETEROL 250-50] 60 each 0     Sig: INHALE 1 PUFF BY MOUTH EVERY 12 HOURS       Asthma & COPD Medication Protocol Failed - 4/10/2024  1:10 PM        Failed - Asthma Action Score greater than or equal to 20        Failed - AAP/ACT given in last 12 months     No data recorded  No data recorded  No data recorded  No data recorded          Passed - Appointment in past 6 or next 3 months      Recent Outpatient Visits              1 month ago Essential hypertension    [facility] Bridger Avendano MD    Office Visit    2 months ago Other acute sinusitis, recurrence not specified    [facility] Aretha Jackson MD    Telemedicine    4 months ago [facility]    6 months ago [facility]al congestion    [facility]    [facility]    6 months ago Bronchospasm    [facility] Anselmo Rosales MD    Office Visit          Future Appointments         Provider Department Appt Notes    In 1 month Rg Wilkerson MD [facility] Bronchospasm, informed to bring id/ins    In 1 month Vitaliy Mckeon MD [facility] diabetic ee                      AMLODIPINE 10 MG Oral Tab [Pharmacy Med Name: AMLODIPINE BESYLATE 10 MG TAB] 90 tablet 3     Sig: TAKE 1 TABLET BY MOUTH EVERY DAY       Hypertension Medications Protocol Passed - 4/10/2024  1:10 PM        Passed - CMP or BMP in past 12 months        Passed - Last BP reading less than 140/90     BP Readings  from Last 1 Encounters:   02/22/24 128/80               Passed - In person appointment or virtual visit in the past 12 mos or appointment in next 3 mos     Recent Outpatient Visits              1 month ago [facility]    [facility]    2 months ago Other acute sinusitis, recurrence not specified    [facility] [place] [facility]    Telemedicine    4 months ago [facility]cholesteremia    [facility]    [facility]    6 months ago Nasal congestion    [facility]    [facility]    6 months ago [facility]          Future Appointments         Provider Department Appt Notes    In 1 month Rg Wilkerson MD [facility] Bronchospasm, informed to bring id/ins    In 1 month Vitaliy Mckeon MD [facility] diabetic ee                    Passed - EGFRCR or GFRAA > 50     GFR Evaluation  EGFRCR: 62 , resulted on 11/13/2023                 Future Appointments         Provider Department Appt Notes    In 1 month Rg Wilkerson MD [facility] Bronchospasm, informed to bring id/ins    In 1 month Vitaliy Mckeon MD [facility] diabetic ee          Recent Outpatient Visits              1 month ago Essential hypertension    [facility]    Office Visit    2 months ago Other acute sinusitis, recurrence not specified    [facility] [place] [place] [facility]    Telemedicine    4 months ago Hypercholesteremia    [place]  [facility], Bridger Alexandre MD    Office Visit    6 months ago Nasal congestion    [facility], Bridger Alexandre MD    Office Visit    6 months ago Bronchospasm    [facility]    [facility] Visit

## 2024-04-11 RX ORDER — AMLODIPINE BESYLATE 10 MG/1
10 TABLET ORAL DAILY
Qty: 90 TABLET | Refills: 3 | Status: SHIPPED | OUTPATIENT
Start: 2024-04-11

## 2024-04-11 RX ORDER — FLUTICASONE PROPIONATE AND SALMETEROL 250; 50 UG/1; UG/1
1 POWDER RESPIRATORY (INHALATION) EVERY 12 HOURS SCHEDULED
Qty: 180 EACH | Refills: 3 | Status: SHIPPED | OUTPATIENT
Start: 2024-04-11 | End: 2024-04-11 | Stop reason: ALTCHOICE

## 2024-04-11 RX ORDER — FLUTICASONE PROPIONATE AND SALMETEROL 250; 50 UG/1; UG/1
1 POWDER RESPIRATORY (INHALATION) EVERY 12 HOURS SCHEDULED
Qty: 180 EACH | Refills: 3 | OUTPATIENT
Start: 2024-04-11

## 2024-05-17 ENCOUNTER — OFFICE VISIT (OUTPATIENT)
Dept: PULMONOLOGY | Facility: CLINIC | Age: 59
End: 2024-05-17

## 2024-05-17 VITALS
HEIGHT: 65 IN | BODY MASS INDEX: 41.48 KG/M2 | DIASTOLIC BLOOD PRESSURE: 89 MMHG | SYSTOLIC BLOOD PRESSURE: 142 MMHG | HEART RATE: 77 BPM | OXYGEN SATURATION: 99 % | WEIGHT: 249 LBS

## 2024-05-17 DIAGNOSIS — G47.33 OSA (OBSTRUCTIVE SLEEP APNEA): ICD-10-CM

## 2024-05-17 DIAGNOSIS — J30.89 ALLERGIC RHINITIS DUE TO OTHER ALLERGIC TRIGGER, UNSPECIFIED SEASONALITY: ICD-10-CM

## 2024-05-17 DIAGNOSIS — J45.30 MILD PERSISTENT ASTHMA WITHOUT COMPLICATION (HCC): Primary | ICD-10-CM

## 2024-05-17 PROCEDURE — 99204 OFFICE O/P NEW MOD 45 MIN: CPT | Performed by: INTERNAL MEDICINE

## 2024-05-17 RX ORDER — FLUTICASONE PROPIONATE 50 MCG
2 SPRAY, SUSPENSION (ML) NASAL DAILY
Qty: 1 EACH | Refills: 0 | Status: SHIPPED | OUTPATIENT
Start: 2024-05-17

## 2024-05-17 RX ORDER — ALBUTEROL SULFATE 90 UG/1
2 AEROSOL, METERED RESPIRATORY (INHALATION) EVERY 4 HOURS PRN
Qty: 3 EACH | Refills: 3 | Status: SHIPPED | OUTPATIENT
Start: 2024-05-17

## 2024-05-17 RX ORDER — FLUTICASONE PROPIONATE AND SALMETEROL 250; 50 UG/1; UG/1
1 POWDER RESPIRATORY (INHALATION) 2 TIMES DAILY
Qty: 1 EACH | Refills: 5 | Status: SHIPPED | OUTPATIENT
Start: 2024-05-17

## 2024-05-17 NOTE — H&P
[facility]    Pulmonary consult     Alisha Wilde Patient Status:  Specimen    10/25/1965 MRN [id number]   Location [facility] Attending No att. providers found   Hosp Day # 0 PCP Brdiger Avendano MD     Date:  2024    History provided by:patient  HPI:     Chief Complaint   Patient presents with    Consult     none     HPI    This is a very pleasant 58-year-old female with history of CKD, EtOH dependence, HTN  Presented for chronic cough and wheezes  Former smoker quit   She works in [employer] with possible exposure to dust  No pets exposure  Cough sometimes worse at night also she has nasal congestion and uses Flonase  Denied dyspnea or dyspnea upon exertion  No chest pain orthopnea or PND  Reported snoring and daytime sleepiness and fatigue  Currently using Advair and seems better  Denies TB or exposure  Denied weight loss or night sweats  Trace pitting edema in lower ankle area with no calf tenderness  No fever or chills    History     Past Medical History:    Chronic kidney disease (CKD)    Esophageal reflux    High blood pressure    High cholesterol    HYPERTENSION    Hypertension    Unspecified essential hypertension     Past Surgical History:   Procedure Laterality Date    Colonoscopy N/A 2018    Procedure: COLONOSCOPY;  Surgeon: Magdy Webber MD;  Location: [facility] ENDOSCOPY    Endometrial ablation      child passed away for 5 mins          1    Other surgical history  11    cysto-Dr. Lacey -- pt denies     Family History   Problem Relation Age of Onset    Hypertension Mother     Heart Disorder Mother 70    Other (Other) Mother         kidney failure    Hypertension Father     Hypertension Maternal Grandfather     Cancer Neg     Diabetes Neg      Social History:  Social History     Socioeconomic History    Marital status: Single   Tobacco Use    Smoking status: Former     Current packs/day:  0.00     Average packs/day: 1 pack/day for 13.0 years (13.0 ttl pk-yrs)     Types: Cigarettes     Start date:      Quit date:      Years since quittin.3    Smokeless tobacco: Former   Vaping Use    Vaping status: Never Used   Substance and Sexual Activity    Alcohol use: Yes     Alcohol/week: 1.0 - 2.0 standard drink of alcohol     Types: 1 - 2 Cans of beer per week     Comment: every 2-3 months     Drug use: No     Social Determinants of Health      Received from [facility]     Allergies/Medications:   Allergies:   Allergies   Allergen Reactions    Atorvastatin MYALGIA    Pravastatin MYALGIA    Rosuvastatin MYALGIA    Seasonal Runny nose     (Not in a hospital admission)      Review of Systems:     Constitutional:  Positive for fatigue. Negative for fever.   HENT:  Positive for congestion.    Respiratory:  Positive for cough and wheezing. Negative for shortness of breath.    Cardiovascular:  Negative for chest pain.   Gastrointestinal:  Negative for abdominal distention.   Skin: Negative.    Neurological: Negative.    Hematological: Negative.    Psychiatric/Behavioral: Negative.         Physical Exam:   Vital Signs:  Blood pressure 142/89, pulse 77, height 5' 5\" (1.651 m), weight 249 lb (112.9 kg), SpO2 99%, not currently breastfeeding.  Physical Exam  Constitutional:       General: She is not in acute distress.     Appearance: She is obese. She is not ill-appearing.   HENT:      Head: Normocephalic and atraumatic.      Nose: Congestion present.      Comments: Positive for nasal polyps     Mouth/Throat:      Mouth: Mucous membranes are moist.   Eyes:      General: No scleral icterus.     Pupils: Pupils are equal, round, and reactive to light.   Cardiovascular:      Rate and Rhythm: Normal rate.      Heart sounds: No murmur heard.     No gallop.   Pulmonary:      Effort: No respiratory distress.      Breath sounds: No stridor. No wheezing, rhonchi or rales.   Abdominal:      General:  Abdomen is flat. Bowel sounds are normal. There is no distension.      Palpations: Abdomen is soft.   Musculoskeletal:      Cervical back: Normal range of motion. No rigidity.      Right lower leg: No edema.      Left lower leg: No edema.   Lymphadenopathy:      Cervical: No cervical adenopathy.   Skin:     General: Skin is dry.   Neurological:      Mental Status: She is oriented to person, place, and time.           Results:     Lab Results   Component Value Date    WBC 7.2 08/10/2023    HGB 12.6 08/10/2023    HCT 40.1 08/10/2023    .0 08/10/2023    CREATSERUM 1.03 (H) 11/13/2023    BUN 15 11/13/2023     11/13/2023    K 3.4 (L) 11/13/2023     11/13/2023    CO2 26.0 11/13/2023    GLU 94 11/13/2023    CA 9.4 11/13/2023    ALB 4.3 11/13/2023    ALKPHO 90 11/13/2023    BILT 0.5 11/13/2023    TP 7.1 11/13/2023    AST 17 11/13/2023    ALT 16 11/13/2023    INR 1.02 08/10/2023    TSH 0.537 03/18/2023    DDIMER 0.42 12/08/2019    ESRML 15 06/05/2021    PHOS 2.6 08/12/2020    TROP <0.045 12/08/2019    TROPHS 14 08/10/2023     (H) 09/23/2021    B12 1,156 (H) 07/23/2018     CXR clear     Assessment/Plan:     1-cough and wheezes likely related to mild persistent asthma  Quit smoking 1999  Chest x-ray clear    Plan ;  Continue with ICS/LAMA with Advair 250 /50 mcg 1 puff twice daily  Albuterol as needed  Baseline PFTs  Avoid triggers    2-allergic rhinitis  Flonase    3-obesity with a BMI of 41 and suspected TANYA  Home sleep study  Diet and exercise  Avoid driving if sleepy  Avoid sedative and narcotic and alcohol    4-history of EtOH dependence        F/u in 3 months           Rg Wilkerson MD  5/17/2024

## 2024-05-22 ENCOUNTER — OFFICE VISIT (OUTPATIENT)
Dept: OPHTHALMOLOGY | Facility: CLINIC | Age: 59
End: 2024-05-22

## 2024-05-22 DIAGNOSIS — H25.13 AGE-RELATED NUCLEAR CATARACT OF BOTH EYES: ICD-10-CM

## 2024-05-22 DIAGNOSIS — E11.9 CONTROLLED TYPE 2 DIABETES MELLITUS WITHOUT COMPLICATION, WITHOUT LONG-TERM CURRENT USE OF INSULIN (HCC): Primary | ICD-10-CM

## 2024-05-22 PROCEDURE — 92004 COMPRE OPH EXAM NEW PT 1/>: CPT | Performed by: OPHTHALMOLOGY

## 2024-05-22 NOTE — ASSESSMENT & PLAN NOTE
Diet controlled diabetes type II: no background of retinopathy, no signs of neovascularization noted.  Discussed ocular and systemic benefits of blood sugar control.  Diagnosis and treatment discussed in detail with patient.      Will see patient in 2 years for a diabetic exam

## 2024-05-22 NOTE — PROGRESS NOTES
Alisha Wilde is a 58 year old female.    HPI:     HPI    Pt here for a DM eye exam.   States with the glasses on she has no vision issues.   Wears progressive bifocals that are a year old and comfortable with them.     No H/O of prior eye Sx/trauma.     Pt has been a pre-diabetic for 5 years       Pt's diabetes is currently controlled by diet  Pt does not check BS regularly.    Pt's last blood sugar was  94  Last HA1C was 6.2 on 23  Endocrinologist: None       Last edited by Shavonne Tidwell OT on 2024  4:11 PM.        Patient History:  Past Medical History:    Chronic kidney disease (CKD)    Esophageal reflux    High blood pressure    High cholesterol    HYPERTENSION    Hypertension    Unspecified essential hypertension       Surgical History: Alisha Wilde has a past surgical history that includes endometrial ablation () (child passed away for 5 mins);  (1); other surgical history (11) (cysto-Dr. Lacey -- pt denies); and colonoscopy (N/A, 2018) (Procedure: COLONOSCOPY;  Surgeon: Magdy Webber MD;  Location: [facility] ENDOSCOPY).    Family History   Problem Relation Age of Onset    Hypertension Mother     Heart Disorder Mother 70    Other (Other) Mother         kidney failure    Hypertension Father     Hypertension Maternal Grandfather     Cancer Neg     Diabetes Neg     Glaucoma Neg     Macular degeneration Neg        Social History:   Social History     Socioeconomic History    Marital status: Single   Tobacco Use    Smoking status: Former     Current packs/day: 0.00     Average packs/day: 1 pack/day for 13.0 years (13.0 ttl pk-yrs)     Types: Cigarettes     Start date:      Quit date:      Years since quittin.4    Smokeless tobacco: Former   Vaping Use    Vaping status: Never Used   Substance and Sexual Activity    Alcohol use: Yes     Alcohol/week: 1.0 - 2.0 standard drink of alcohol     Types: 1 - 2 Cans of beer per week     Comment: every 2-3 months     Drug use:  No     Social Determinants of Health      Received from [facility]       Medications:  Current Outpatient Medications   Medication Sig Dispense Refill    fluticasone-salmeterol 250-50 MCG/ACT Inhalation Aerosol Powder, Breath Activated Inhale 1 puff into the lungs 2 (two) times daily. inhale 1 puff by INHALATION route 2 times every day morning and evening approximately 12 hours apart 1 each 5    albuterol 108 (90 Base) MCG/ACT Inhalation Aero Soln Inhale 2 puffs into the lungs every 4 (four) hours as needed for Wheezing. 3 each 3    fluticasone propionate 50 MCG/ACT Nasal Suspension 2 sprays by Nasal route daily. 1 each 0    amLODIPine 10 MG Oral Tab Take 1 tablet (10 mg total) by mouth daily. 90 tablet 3    metoprolol succinate ER 50 MG Oral Tablet 24 Hr Take 1 tablet (50 mg total) by mouth daily. 90 tablet 3    VITAMIN D OR Take by mouth.      Ascorbic Acid (VITAMIN C) 1000 MG Oral Tab Vitamin C 1,000 mg tablet, [RxNorm: 948753]      REPATHA SURECLICK 140 MG/ML Subcutaneous Solution Auto-injector       PRALUENT 75 MG/ML Subcutaneous Solution Auto-injector       sodium chloride (SALINE MIST SPRAY) 0.65 % Nasal Solution 1 spray by Nasal route as needed. 60 mL 0    Potassium Chloride ER 20 MEQ Oral Tab CR Take 1 tablet by mouth daily. 90 tablet 3    Losartan Potassium-HCTZ 100-12.5 MG Oral Tab Take 1 tablet by mouth daily. 90 tablet 3    vitamin B-12 50 MCG Oral Tab Take 1 tablet (50 mcg total) by mouth daily.         Allergies:  Allergies   Allergen Reactions    Atorvastatin MYALGIA    Pravastatin MYALGIA    Rosuvastatin MYALGIA    Seasonal Runny nose       ROS:       PHYSICAL EXAM:     Base Eye Exam       Visual Acuity (Snellen - Linear)         Right Left    Dist cc 20/25 +2 20/20 -2    Dist ph cc 20/20     Near cc 20/25 20/20      Correction: Glasses              Tonometry (Icare, 3:36 PM)         Right Left    Pressure 22 23              Pupils         Pupils    Right PERRL    Left PERRL               Visual Fields         Left Right     Full Full              Extraocular Movement         Right Left     Full Full                  Slit Lamp and Fundus Exam       Slit Lamp Exam         Right Left    Lids/Lashes Dermatochalasis, Meibomian gland dysfunction Dermatochalasis, Meibomian gland dysfunction    Conjunctiva/Sclera Ocular Melanosis Ocular Melanosis    Cornea Clear Clear    Anterior Chamber Deep and quiet Deep and quiet    Iris Normal Normal    Lens Trace Nuclear sclerosis Trace Nuclear sclerosis    Vitreous Clear Clear              Fundus Exam         Right Left    Disc Sloping margin, Temporal crescent Sloping margin, Temporal crescent    C/D Ratio 0.5 0.5    Macula Normal-no BDR Normal-no BDR    Vessels Normal Normal    Periphery Normal Normal                  Refraction       Wearing Rx         Sphere Cylinder Axis Add    Right -6.50 +0.50 113 +2.25    Left -7.00 +1.25 064 +2.25      Age: 1yr    Type: Progressive bifocal              Manifest Refraction (Auto)         Sphere Cylinder Axis    Right -5.75 +0.25 100    Left -5.50 +0.50 070   Declines refraction, states sees optometrist for glasses and uses her vision insurance.                     ASSESSMENT/PLAN:     Diagnoses and Plan:     Controlled type 2 diabetes mellitus without complication, without long-term current use of insulin ([facility])  Diet controlled diabetes type II: no background of retinopathy, no signs of neovascularization noted.  Discussed ocular and systemic benefits of blood sugar control.  Diagnosis and treatment discussed in detail with patient.      Will see patient in 2 years for a diabetic exam    Age-related nuclear cataract of both eyes  Discussed early cataracts with patient.  No treatment recommended at this time.       No orders of the defined types were placed in this encounter.      Meds This Visit:  Requested Prescriptions      No prescriptions requested or ordered in this encounter        Follow up instructions:  Return  in about 2 years (around 5/22/2026) for Diabetic eye exam.    5/22/2024  Scribed by: Vitaliy Mckeon MD

## 2024-05-22 NOTE — PATIENT INSTRUCTIONS
Controlled type 2 diabetes mellitus without complication, without long-term current use of insulin ([facility])  Diet controlled diabetes type II: no background of retinopathy, no signs of neovascularization noted.  Discussed ocular and systemic benefits of blood sugar control.  Diagnosis and treatment discussed in detail with patient.      Will see patient in 2 years for a diabetic exam    Age-related nuclear cataract of both eyes  Discussed early cataracts with patient.  No treatment recommended at this time.

## 2024-06-20 NOTE — TELEPHONE ENCOUNTER
90 day prescription request     Current Outpatient Medications   Medication Sig Dispense Refill                  fluticasone propionate 50 MCG/ACT Nasal Suspension 2 sprays by Nasal route daily. 1 each 0

## 2024-06-21 RX ORDER — FLUTICASONE PROPIONATE 50 MCG
2 SPRAY, SUSPENSION (ML) NASAL DAILY
Qty: 3 EACH | Refills: 1 | Status: SHIPPED | OUTPATIENT
Start: 2024-06-21

## 2024-07-15 DIAGNOSIS — E87.6 HYPOKALEMIA: ICD-10-CM

## 2024-07-15 DIAGNOSIS — I10 ESSENTIAL HYPERTENSION: ICD-10-CM

## 2024-07-18 RX ORDER — METOPROLOL SUCCINATE 25 MG/1
25 TABLET, EXTENDED RELEASE ORAL DAILY
Qty: 90 TABLET | Refills: 3 | OUTPATIENT
Start: 2024-07-18

## 2024-07-18 NOTE — TELEPHONE ENCOUNTER
Metoprolol 25mg: dose increased to Metoprolol 50mg on 01/20/2024. 1 year supply sent for Metoprolol 50mg on 01/20/2024 to [facility] pharmacy in [place]

## 2024-07-18 NOTE — TELEPHONE ENCOUNTER
Please review; protocol failed/No Protocol    Last Office Visit: 02/22/2024    Requested Prescriptions   Pending Prescriptions Disp Refills    LOSARTAN POTASSIUM-HCTZ 100-12.5 MG Oral Tab [Pharmacy Med Name: LOSARTAN-HCTZ 100-12.5 MG TAB] 90 tablet 3     Sig: TAKE 1 TABLET BY MOUTH EVERY DAY       Hypertension Medications Protocol Failed - 7/15/2024 11:29 AM        Failed - Last BP reading less than 140/90     BP Readings from Last 1 Encounters:   05/17/24 142/89               Passed - CMP or BMP in past 12 months        Passed - In person appointment or virtual visit in the past 12 mos or appointment in next 3 mos     Recent Outpatient Visits              1 month ago Controlled type 2 diabetes mellitus without complication, without long-term current use of insulin ([facility])    [facility] Vitaliy Mckeon MD    Office Visit    2 months ago Mild persistent asthma without complication ([facility])    [facility] Rg Wilkerson MD    Office Visit    4 months ago [facility] Visit    6 months ago Other acute sinusitis, recurrence not specified    [facility] Aretha Jackson MD    Telemedicine    7 months ago [facility]cholesteremia    [facility]          Future Appointments         Provider Department Appt Notes    In 1 month [facility] SLEEP ROOMS [facility] no pa req    In 1 month Rg Wilkerson MD [facility] 3 mo f/u   (Policy informed)                    Passed - EGFRCR or GFRAA > 50     GFR Evaluation  EGFRCR: 62 , resulted on 11/13/2023            POTASSIUM CHLORIDE ER 20 MEQ Oral Tab CR [Pharmacy Med Name: POTASSIUM CL ER 20 MEQ TABLET] 90 tablet 3     Sig: TAKE 1  TABLET BY MOUTH EVERY DAY       There is no refill protocol information for this order      Refused Prescriptions Disp Refills    METOPROLOL SUCCINATE ER 25 MG Oral Tablet 24 Hr [Pharmacy Med Name: METOPROLOL SUCC ER 25 MG TAB] 90 tablet 3     Sig: TAKE 1 TABLET (25 MG TOTAL) BY MOUTH DAILY.       Hypertension Medications Protocol Failed - 7/15/2024 11:29 AM        Failed - Last BP reading less than 140/90     BP Readings from Last 1 Encounters:   05/17/24 142/89               Passed - CMP or BMP in past 12 months        Passed - In person appointment or virtual visit in the past 12 mos or appointment in next 3 mos     Recent Outpatient Visits              1 month ago Controlled type 2 diabetes mellitus without complication, without long-term current use of insulin ([facility])    [facility] Vitaliy Mckeon MD    Office Visit    2 months ago Mild persistent asthma without complication ([facility])    [facility] Rg Wilkerson MD    Office Visit    4 months ago Essential hypertension    [facility]    Office Visit    6 months ago Other acute sinusitis, recurrence not specified    [facility] Aretha Jackson MD    Telemedicine    7 months ago Hypercholesteremia    [facility] Bridger [facility]end[facility]    Office Visit          Future Appointments         Provider Department Appt Notes    In 1 month [facility] SLEEP ROOMS [facility] no pa req    In 1 month Rg Wilkerson MD Count includes the [facility] 3 mo f/u   (Policy informed)                    Passed - EGFRCR or GFRAA > 50     GFR Evaluation  EGFRCR: 62 , resulted on 11/13/2023             Future Appointments         Provider Department Appt Notes    In 1 month [facility] SLEEP ROOMS [facility] Sleep  [place] no pa req    In 1 month Rg Wilkerson MD [facility] 3 mo f/u   (Policy informed)          Recent Outpatient Visits              1 month ago Controlled type 2 diabetes mellitus without complication, without long-term current use of insulin ([facility])    [facility] Vitaliy Mckeon MD    Office Visit    2 months ago Mild persistent asthma without complication ([facility])    [facility] Rg Wilkerson MD    Office Visit    4 months ago Essential hypertension    [facility]    [facility] Visit    6 months ago Other acute sinusitis, recurrence not specified    [facility]    [facility]    7 months ago Hypercholesteremia    [facility]    [facility]

## 2024-07-19 RX ORDER — POTASSIUM CHLORIDE 1500 MG/1
1 TABLET, EXTENDED RELEASE ORAL DAILY
Qty: 90 TABLET | Refills: 3 | Status: SHIPPED | OUTPATIENT
Start: 2024-07-19

## 2024-07-19 RX ORDER — LOSARTAN POTASSIUM AND HYDROCHLOROTHIAZIDE 12.5; 1 MG/1; MG/1
1 TABLET ORAL DAILY
Qty: 90 TABLET | Refills: 3 | Status: SHIPPED | OUTPATIENT
Start: 2024-07-19

## 2024-08-26 ENCOUNTER — OFFICE VISIT (OUTPATIENT)
Dept: SLEEP CENTER | Age: 59
End: 2024-08-26
Attending: INTERNAL MEDICINE
Payer: COMMERCIAL

## 2024-08-26 DIAGNOSIS — G47.33 OSA (OBSTRUCTIVE SLEEP APNEA): ICD-10-CM

## 2024-08-26 PROCEDURE — 95806 SLEEP STUDY UNATT&RESP EFFT: CPT

## 2024-09-09 DIAGNOSIS — I10 ESSENTIAL HYPERTENSION: ICD-10-CM

## 2024-09-11 RX ORDER — METOPROLOL SUCCINATE 25 MG/1
25 TABLET, EXTENDED RELEASE ORAL DAILY
Qty: 90 TABLET | Refills: 3 | OUTPATIENT
Start: 2024-09-11

## 2024-09-26 ENCOUNTER — TELEPHONE (OUTPATIENT)
Dept: PULMONOLOGY | Facility: CLINIC | Age: 59
End: 2024-09-26

## 2024-09-26 DIAGNOSIS — G47.33 OSA (OBSTRUCTIVE SLEEP APNEA): Primary | ICD-10-CM

## 2024-09-26 NOTE — TELEPHONE ENCOUNTER
Discussed results and recommendations with patient. Patient verbalized understanding. Order placed to [facility] through [place]. Patient has follow up appointment scheduled for 12/18/24.     Dr Wilkerson- please sign pended order

## 2024-09-26 NOTE — TELEPHONE ENCOUNTER
----- Message from Rg Wilkerson sent at 9/24/2024  4:00 PM CDT -----  Informed the patient that her sleep study showed obstructive sleep apnea    Please order for the patient auto CPAP 4-14 CWP with mask fitting  Follow-up with me 3 months after initiating therapy  Thank you

## 2024-09-29 ENCOUNTER — LAB ENCOUNTER (OUTPATIENT)
Dept: LAB | Facility: HOSPITAL | Age: 59
End: 2024-09-29
Attending: FAMILY MEDICINE
Payer: COMMERCIAL

## 2024-09-29 DIAGNOSIS — E11.9 CONTROLLED TYPE 2 DIABETES MELLITUS WITHOUT COMPLICATION, WITHOUT LONG-TERM CURRENT USE OF INSULIN (HCC): ICD-10-CM

## 2024-09-29 DIAGNOSIS — Z00.00 WELL ADULT EXAM: ICD-10-CM

## 2024-09-29 LAB
ALBUMIN SERPL-MCNC: 4.7 G/DL (ref 3.2–4.8)
ALBUMIN/GLOB SERPL: 1.6 {RATIO} (ref 1–2)
ALP LIVER SERPL-CCNC: 98 U/L
ALT SERPL-CCNC: 32 U/L
ANION GAP SERPL CALC-SCNC: 9 MMOL/L (ref 0–18)
AST SERPL-CCNC: 32 U/L (ref ?–34)
BASOPHILS # BLD AUTO: 0.02 X10(3) UL (ref 0–0.2)
BASOPHILS NFR BLD AUTO: 0.4 %
BILIRUB SERPL-MCNC: 0.6 MG/DL (ref 0.2–1.1)
BUN BLD-MCNC: 17 MG/DL (ref 9–23)
BUN/CREAT SERPL: 15.6 (ref 10–20)
CALCIUM BLD-MCNC: 9.7 MG/DL (ref 8.7–10.4)
CHLORIDE SERPL-SCNC: 110 MMOL/L (ref 98–112)
CHOLEST SERPL-MCNC: 161 MG/DL (ref ?–200)
CO2 SERPL-SCNC: 25 MMOL/L (ref 21–32)
CREAT BLD-MCNC: 1.09 MG/DL
DEPRECATED RDW RBC AUTO: 47.3 FL (ref 35.1–46.3)
EGFRCR SERPLBLD CKD-EPI 2021: 58 ML/MIN/1.73M2 (ref 60–?)
EOSINOPHIL # BLD AUTO: 0.15 X10(3) UL (ref 0–0.7)
EOSINOPHIL NFR BLD AUTO: 2.7 %
ERYTHROCYTE [DISTWIDTH] IN BLOOD BY AUTOMATED COUNT: 15.8 % (ref 11–15)
EST. AVERAGE GLUCOSE BLD GHB EST-MCNC: 131 MG/DL (ref 68–126)
FASTING PATIENT LIPID ANSWER: YES
FASTING STATUS PATIENT QL REPORTED: YES
GLOBULIN PLAS-MCNC: 3 G/DL (ref 2–3.5)
GLUCOSE BLD-MCNC: 76 MG/DL (ref 70–99)
HBA1C MFR BLD: 6.2 % (ref ?–5.7)
HCT VFR BLD AUTO: 39.9 %
HDLC SERPL-MCNC: 53 MG/DL (ref 40–59)
HGB BLD-MCNC: 13.3 G/DL
IMM GRANULOCYTES # BLD AUTO: 0.01 X10(3) UL (ref 0–1)
IMM GRANULOCYTES NFR BLD: 0.2 %
LDLC SERPL CALC-MCNC: 92 MG/DL (ref ?–100)
LYMPHOCYTES # BLD AUTO: 2.18 X10(3) UL (ref 1–4)
LYMPHOCYTES NFR BLD AUTO: 38.9 %
MCH RBC QN AUTO: 27.4 PG (ref 26–34)
MCHC RBC AUTO-ENTMCNC: 33.3 G/DL (ref 31–37)
MCV RBC AUTO: 82.3 FL
MONOCYTES # BLD AUTO: 0.44 X10(3) UL (ref 0.1–1)
MONOCYTES NFR BLD AUTO: 7.9 %
NEUTROPHILS # BLD AUTO: 2.8 X10 (3) UL (ref 1.5–7.7)
NEUTROPHILS # BLD AUTO: 2.8 X10(3) UL (ref 1.5–7.7)
NEUTROPHILS NFR BLD AUTO: 49.9 %
NONHDLC SERPL-MCNC: 108 MG/DL (ref ?–130)
OSMOLALITY SERPL CALC.SUM OF ELEC: 298 MOSM/KG (ref 275–295)
PLATELET # BLD AUTO: 213 10(3)UL (ref 150–450)
POTASSIUM SERPL-SCNC: 3.7 MMOL/L (ref 3.5–5.1)
PROT SERPL-MCNC: 7.7 G/DL (ref 5.7–8.2)
RBC # BLD AUTO: 4.85 X10(6)UL
SODIUM SERPL-SCNC: 144 MMOL/L (ref 136–145)
TRIGL SERPL-MCNC: 87 MG/DL (ref 30–149)
TSI SER-ACNC: 0.7 MIU/ML (ref 0.55–4.78)
VLDLC SERPL CALC-MCNC: 14 MG/DL (ref 0–30)
WBC # BLD AUTO: 5.6 X10(3) UL (ref 4–11)

## 2024-09-29 PROCEDURE — 85025 COMPLETE CBC W/AUTO DIFF WBC: CPT

## 2024-09-29 PROCEDURE — 84443 ASSAY THYROID STIM HORMONE: CPT

## 2024-09-29 PROCEDURE — 80053 COMPREHEN METABOLIC PANEL: CPT

## 2024-09-29 PROCEDURE — 80061 LIPID PANEL: CPT

## 2024-09-29 PROCEDURE — 83036 HEMOGLOBIN GLYCOSYLATED A1C: CPT

## 2024-09-29 PROCEDURE — 36415 COLL VENOUS BLD VENIPUNCTURE: CPT

## 2024-10-01 ENCOUNTER — TELEPHONE (OUTPATIENT)
Dept: PULMONOLOGY | Facility: CLINIC | Age: 59
End: 2024-10-01

## 2024-10-01 NOTE — TELEPHONE ENCOUNTER
Received fax from [facility] with order for PAP supplies. Placed in Dr Wilkerson's folder for signature

## 2024-10-03 RX ORDER — METOPROLOL SUCCINATE 50 MG/1
50 TABLET, EXTENDED RELEASE ORAL DAILY
Qty: 90 TABLET | Refills: 3 | Status: SHIPPED | OUTPATIENT
Start: 2024-10-03

## 2024-10-24 ENCOUNTER — OFFICE VISIT (OUTPATIENT)
Dept: FAMILY MEDICINE CLINIC | Facility: CLINIC | Age: 59
End: 2024-10-24
Payer: COMMERCIAL

## 2024-10-24 VITALS
BODY MASS INDEX: 42.32 KG/M2 | WEIGHT: 254 LBS | HEIGHT: 65 IN | DIASTOLIC BLOOD PRESSURE: 84 MMHG | SYSTOLIC BLOOD PRESSURE: 130 MMHG | HEART RATE: 71 BPM

## 2024-10-24 DIAGNOSIS — Z12.11 COLON CANCER SCREENING: ICD-10-CM

## 2024-10-24 DIAGNOSIS — Z23 NEED FOR SHINGLES VACCINE: ICD-10-CM

## 2024-10-24 DIAGNOSIS — Z01.419 ENCOUNTER FOR GYNECOLOGICAL EXAMINATION: ICD-10-CM

## 2024-10-24 DIAGNOSIS — E11.9 CONTROLLED TYPE 2 DIABETES MELLITUS WITHOUT COMPLICATION, WITHOUT LONG-TERM CURRENT USE OF INSULIN (HCC): ICD-10-CM

## 2024-10-24 DIAGNOSIS — T46.6X5A MYALGIA DUE TO STATIN: ICD-10-CM

## 2024-10-24 DIAGNOSIS — N62 BREAST HYPERTROPHY IN FEMALE: ICD-10-CM

## 2024-10-24 DIAGNOSIS — Z00.00 WELL ADULT EXAM: Primary | ICD-10-CM

## 2024-10-24 DIAGNOSIS — E87.6 HYPOKALEMIA: ICD-10-CM

## 2024-10-24 DIAGNOSIS — R06.2 WHEEZING: ICD-10-CM

## 2024-10-24 DIAGNOSIS — I10 ESSENTIAL HYPERTENSION: ICD-10-CM

## 2024-10-24 DIAGNOSIS — Z12.31 ENCOUNTER FOR SCREENING MAMMOGRAM FOR BREAST CANCER: ICD-10-CM

## 2024-10-24 DIAGNOSIS — M79.10 MYALGIA DUE TO STATIN: ICD-10-CM

## 2024-10-24 DIAGNOSIS — E78.00 HYPERCHOLESTEREMIA: ICD-10-CM

## 2024-10-24 DIAGNOSIS — E55.9 VITAMIN D DEFICIENCY: ICD-10-CM

## 2024-10-24 DIAGNOSIS — F10.20 ALCOHOLISM (HCC): ICD-10-CM

## 2024-10-24 DIAGNOSIS — N28.9 FUNCTION KIDNEY DECREASED: ICD-10-CM

## 2024-10-24 LAB
CREAT UR-SCNC: 98.2 MG/DL
MICROALBUMIN UR-MCNC: 7.9 MG/DL
MICROALBUMIN/CREAT 24H UR-RTO: 80.4 UG/MG (ref ?–30)

## 2024-10-24 PROCEDURE — 99214 OFFICE O/P EST MOD 30 MIN: CPT | Performed by: FAMILY MEDICINE

## 2024-10-24 PROCEDURE — 99396 PREV VISIT EST AGE 40-64: CPT | Performed by: FAMILY MEDICINE

## 2024-10-24 RX ORDER — CALCIUM CARBONATE/VITAMIN D3 600 MG-10
TABLET ORAL
COMMUNITY
Start: 2022-08-04

## 2024-10-24 NOTE — PROGRESS NOTES
HPI:    Patient ID: Alisha Wilde is a 58 year old female.    Gyn Exam  Pertinent negatives include no abdominal pain, arthralgias, chest pain, chills, congestion, coughing, fatigue, fever, headaches, myalgias, nausea, numbness, rash, sore throat, vomiting or weakness.     Chief Complaint   Patient presents with    Routine Physical    Gyn Exam       Wt Readings from Last 6 Encounters:   10/24/24 254 lb (115.2 kg)   05/17/24 249 lb (112.9 kg)   02/22/24 249 lb (112.9 kg)   12/06/23 249 lb (112.9 kg)   10/09/23 248 lb (112.5 kg)   09/25/23 245 lb (111.1 kg)     BP Readings from Last 3 Encounters:   10/24/24 130/84   05/17/24 142/89   02/22/24 128/80     Still drinking 2 glasses of tequilla daily  Stable mentally  She prays  Sexually active occasionally.  Using condoms    Review of Systems   Constitutional:  Negative for activity change, appetite change, chills, fatigue, fever and unexpected weight change.   HENT:  Negative for congestion, dental problem, drooling, ear discharge, ear pain, facial swelling, hearing loss, mouth sores, nosebleeds, postnasal drip, rhinorrhea, sinus pressure, sinus pain, sneezing, sore throat, tinnitus, trouble swallowing and voice change.    Eyes:  Negative for pain, discharge, redness and visual disturbance.   Respiratory:  Negative for cough, shortness of breath and wheezing.    Cardiovascular:  Negative for chest pain, palpitations and leg swelling.   Gastrointestinal:  Negative for abdominal pain, anal bleeding, blood in stool, constipation, diarrhea, nausea, rectal pain and vomiting.   Endocrine: Negative for cold intolerance, heat intolerance, polydipsia, polyphagia and polyuria.   Genitourinary:  Negative for decreased urine volume, difficulty urinating, dysuria, flank pain, frequency, menstrual problem, pelvic pain, urgency, vaginal bleeding, vaginal discharge and vaginal pain.        Is menopausal     Musculoskeletal:  Negative for arthralgias, back pain and myalgias.   Skin:   Negative for rash.   Neurological:  Negative for dizziness, seizures, syncope, weakness, numbness and headaches.   Hematological:  Does not bruise/bleed easily.   Psychiatric/Behavioral:  Negative for behavioral problems, decreased concentration, self-injury, sleep disturbance and suicidal ideas. The patient is not nervous/anxious.        /84   Pulse 71   Ht 5' 5\" (1.651 m)   Wt 254 lb (115.2 kg)   BMI 42.27 kg/m²     Past Medical History:    Chronic kidney disease (CKD)    Esophageal reflux    High blood pressure    High cholesterol    HYPERTENSION    Hypertension    Unspecified essential hypertension     Past Surgical History:   Procedure Laterality Date    Colonoscopy N/A 2018    Procedure: COLONOSCOPY;  Surgeon: Magdy Webber MD;  Location: [facility] ENDOSCOPY    Endometrial ablation      child passed away for 5 mins          1    Other surgical history  11    cysto-Dr. Lacey -- pt denies     Social History     Socioeconomic History    Marital status: Single     Spouse name: Not on file    Number of children: Not on file    Years of education: Not on file    Highest education level: Not on file   Occupational History    Not on file   Tobacco Use    Smoking status: Former     Current packs/day: 0.00     Average packs/day: 1 pack/day for 13.0 years (13.0 ttl pk-yrs)     Types: Cigarettes     Start date:      Quit date:      Years since quittin.8    Smokeless tobacco: Former   Vaping Use    Vaping status: Never Used   Substance and Sexual Activity    Alcohol use: Yes     Alcohol/week: 1.0 - 2.0 standard drink of alcohol     Types: 1 - 2 Cans of beer per week     Comment: every 2-3 months     Drug use: No    Sexual activity: Not on file   Other Topics Concern    Not on file   Social History Narrative    Not on file     Social Drivers of Health     Financial Resource Strain: Not on file   Food Insecurity: Not on file   Transportation Needs: Not on file   Physical Activity:  Not on file   Stress: Not on file   Social Connections: Not on file   Housing Stability: At Risk (8/18/2023)    [facility] [employer]     Living Situation: Not on file     Housing Problems: Not on file     Family History   Problem Relation Age of Onset    Hypertension Mother     Heart Disorder Mother 70    Other (Other) Mother         kidney failure    Hypertension Father     Hypertension Maternal Grandfather     Cancer Neg     Diabetes Neg     Glaucoma Neg     Macular degeneration Neg        Immunization History   Administered Date(s) Administered    Covid-19 Vaccine Pfizer 30 mcg/0.3 ml 03/24/2021, 04/14/2021    Covid-19 Vaccine Pfizer Damir-Sucrose 30 mcg/0.3 ml 01/29/2022    Kenalog Per 10mg Inj 02/20/2017    Pneumococcal Conjugate PCV20 07/20/2022    Pneumovax 23 10/01/2020    TDAP 09/09/2019    Tb Intradermal Test 02/10/2015       Health Maintenance   Topic Date Due    Zoster Vaccines (1 of 2) Never done    Colorectal Cancer Screening  06/02/2021    Annual Physical  03/25/2024    Mammogram  04/28/2024    COVID-19 Vaccine (4 - 2023-24 season) 09/01/2024    Influenza Vaccine (1) Never done    Diabetes Care: Microalb/Creat Ratio  11/13/2024    HTN: BP Follow-Up  11/24/2024    Diabetes Care Foot Exam  12/06/2024    Diabetes Care A1C  03/29/2025    Diabetes Care Dilated Eye Exam  05/22/2025    Diabetes Care: GFR  09/29/2025    Asthma Control Test  10/24/2025    Pap Smear  03/25/2026    DTaP,Tdap,and Td Vaccines (2 - Td or Tdap) 09/09/2029    Annual Depression Screening  Completed    Pneumococcal Vaccine: Birth to 64yrs  Completed          Current Outpatient Medications   Medication Sig Dispense Refill    Calcium Carb-Cholecalciferol 600-10 MG-MCG Oral Tab Calcium 600 + D(3) 600 mg-10 mcg (400 unit) tablet, [RxNorm: 384060]      metoprolol succinate ER 50 MG Oral Tablet 24 Hr Take 1 tablet (50 mg total) by mouth daily. 90 tablet 3    Losartan Potassium-HCTZ 100-12.5 MG Oral Tab Take  1 tablet by mouth daily. 90 tablet 3    Potassium Chloride ER 20 MEQ Oral Tab CR Take 1 tablet by mouth daily. 90 tablet 3    fluticasone propionate 50 MCG/ACT Nasal Suspension 2 sprays by Nasal route daily. 3 each 1    fluticasone-salmeterol 250-50 MCG/ACT Inhalation Aerosol Powder, Breath Activated Inhale 1 puff into the lungs 2 (two) times daily. inhale 1 puff by INHALATION route 2 times every day morning and evening approximately 12 hours apart 1 each 5    albuterol 108 (90 Base) MCG/ACT Inhalation Aero Soln Inhale 2 puffs into the lungs every 4 (four) hours as needed for Wheezing. 3 each 3    amLODIPine 10 MG Oral Tab Take 1 tablet (10 mg total) by mouth daily. 90 tablet 3    VITAMIN D OR Take by mouth.      Ascorbic Acid (VITAMIN C) 1000 MG Oral Tab Vitamin C 1,000 mg tablet, [RxNorm: 745113]      REPATHA SURECLICK 140 MG/ML Subcutaneous Solution Auto-injector       PRALUENT 75 MG/ML Subcutaneous Solution Auto-injector       sodium chloride (SALINE MIST SPRAY) 0.65 % Nasal Solution 1 spray by Nasal route as needed. 60 mL 0    vitamin B-12 50 MCG Oral Tab Take 1 tablet (50 mcg total) by mouth daily.       Allergies:Allergies[1]   PHYSICAL EXAM:     Chief Complaint   Patient presents with    Routine Physical    Gyn Exam      Physical Exam  Vitals and nursing note reviewed.   Constitutional:       Appearance: She is well-developed.   HENT:      Head: Normocephalic and atraumatic.      Right Ear: External ear normal.      Left Ear: External ear normal.      Nose: Nose normal.      Mouth/Throat:      Pharynx: No oropharyngeal exudate.   Eyes:      General:         Right eye: No discharge.         Left eye: No discharge.      Conjunctiva/sclera: Conjunctivae normal.      Pupils: Pupils are equal, round, and reactive to light.   Neck:      Thyroid: No thyromegaly.   Cardiovascular:      Rate and Rhythm: Normal rate and regular rhythm.      Heart sounds: Normal heart sounds. No murmur heard.  Pulmonary:      Effort:  Pulmonary effort is normal.      Breath sounds: Normal breath sounds. No wheezing.   Chest:   Breasts:     Nikko Score is 5.      Breasts are symmetrical.      Right: Normal.      Left: Normal.      Comments: Large breasts  Abdominal:      General: Bowel sounds are normal.      Palpations: Abdomen is soft. There is no mass.      Tenderness: There is no abdominal tenderness.   Genitourinary:     Labia:         Right: No rash, tenderness, lesion or injury.         Left: No rash, tenderness, lesion or injury.       Vagina: Normal. No vaginal discharge.      Cervix: No cervical motion tenderness, discharge or friability.      Adnexa:         Right: No mass, tenderness or fullness.          Left: No mass, tenderness or fullness.     Musculoskeletal:         General: No tenderness.      Cervical back: Normal range of motion and neck supple.   Lymphadenopathy:      Cervical: No cervical adenopathy.      Upper Body:      Right upper body: No supraclavicular or axillary adenopathy.      Left upper body: No supraclavicular or axillary adenopathy.   Skin:     General: Skin is dry.      Findings: No rash.   Neurological:      Mental Status: She is alert and oriented to person, place, and time.      Cranial Nerves: No cranial nerve deficit.      Motor: No abnormal muscle tone.      Coordination: Coordination normal.      Deep Tendon Reflexes: Reflexes are normal and symmetric. Reflexes normal.   Psychiatric:         Behavior: Behavior normal.         Thought Content: Thought content normal.         Judgment: Judgment normal.                ASSESSMENT/PLAN:     Return yearly for physicals  Follow up with dentist every 6 months  Follow up with eye doctor yearly  Recommend aerobic exercise for at least 30mins 5 days a week  Yearly flu shot  Tetanus booster every 10 years (Tdap/ Td)  Labs ordered/ or reviewed if done prior to appointment     Encounter Diagnoses   Name Primary?    Well adult exam Yes    Encounter for gynecological  examination     Encounter for screening mammogram for breast cancer     Need for shingles vaccine     Function kidney decreased     Hypercholesteremia     Essential hypertension     Hypokalemia     Controlled type 2 diabetes mellitus without complication, without long-term current use of insulin (HCC)     Myalgia due to statin     Vitamin D deficiency     Colon cancer screening     Alcoholism (HCC)     Wheezing     Breast hypertrophy in female        1. Well adult exam  Labs reviewed    2. Encounter for gynecological examination  Pelvic and breast exam done     3. Encounter for screening mammogram for breast cancer    - [facility] RUCHI 2D+3D SCREENING BILAT (CPT=77067/82832); Future    4. Need for shingles vaccine  declined  - ZOSTER VACC RECOMBINANT IM NJX    5. Function kidney decreased  Increase water intake  Avoid nsaids     6. Hypercholesteremia  Well controlled  Continue present management     7. Essential hypertension  Stable condition  Reviewed medications  Continue current medication management   All questions answered to the best of my ability.      8. Hypokalemia  Replaced     9. Controlled type 2 diabetes mellitus without complication, without long-term current use of insulin (HCC)  Well controlled    - Microalb/Creat Ratio, Random Urine; Future    10. Myalgia due to statin      11. Vitamin D deficiency  Replaced     12. Colon cancer screening    - Gastro GI Telephone Colon Screen; Future    13. Alcoholism (HCC)  Encouraged decreasing alcohol use    14. Wheezing  Albuterol as needed     15. Breast hypertrophy in female    - Plastic Surgery Referral - Real (Dioni and Kayleen)      Orders Placed This Encounter   Procedures    Microalb/Creat Ratio, Random Urine    Fluzone trivalent vaccine, PF 0.5mL, 6mo+ (75951)    ZOSTER VACC RECOMBINANT IM NJX       The above note was creating using Dragon speech recognition technology. Please excuse any typos    Meds This Visit:  Requested Prescriptions      No prescriptions  requested or ordered in this encounter       Imaging & Referrals:  INFLUENZA VACCINE, TRI, PRESERV FREE, 0.5 ML  ZOSTER VACC RECOMBINANT IM NJX  PLASTIC SURGERY - INTERNAL  RADHA RUCHI 2D+3D SCREENING BILAT (CPT=77067/25702)  OP REFERRAL TO [facility] GI TELEPHONE COLON SCREEN       ID#[id number]       [1]   Allergies  Allergen Reactions    Atorvastatin MYALGIA    Pravastatin MYALGIA    Rosuvastatin MYALGIA    Seasonal Runny nose

## 2024-10-28 DIAGNOSIS — E11.9 CONTROLLED TYPE 2 DIABETES MELLITUS WITHOUT COMPLICATION, WITHOUT LONG-TERM CURRENT USE OF INSULIN (HCC): ICD-10-CM

## 2024-10-28 DIAGNOSIS — I10 ESSENTIAL HYPERTENSION: Primary | ICD-10-CM

## 2024-10-28 DIAGNOSIS — R80.9 POSITIVE FOR MICROALBUMINURIA: ICD-10-CM

## 2024-10-28 DIAGNOSIS — N28.9 FUNCTION KIDNEY DECREASED: ICD-10-CM

## 2024-11-21 ENCOUNTER — APPOINTMENT (OUTPATIENT)
Dept: GENERAL RADIOLOGY | Facility: HOSPITAL | Age: 59
End: 2024-11-21
Attending: THORACIC SURGERY (CARDIOTHORACIC VASCULAR SURGERY)
Payer: COMMERCIAL

## 2024-11-21 ENCOUNTER — APPOINTMENT (OUTPATIENT)
Dept: CT IMAGING | Facility: HOSPITAL | Age: 59
DRG: 003 | End: 2024-11-21
Attending: EMERGENCY MEDICINE
Payer: COMMERCIAL

## 2024-11-21 ENCOUNTER — ANESTHESIA (OUTPATIENT)
Dept: SURGERY | Facility: HOSPITAL | Age: 59
End: 2024-11-21
Payer: COMMERCIAL

## 2024-11-21 ENCOUNTER — APPOINTMENT (OUTPATIENT)
Dept: CT IMAGING | Facility: HOSPITAL | Age: 59
End: 2024-11-21
Attending: EMERGENCY MEDICINE
Payer: COMMERCIAL

## 2024-11-21 ENCOUNTER — HOSPITAL ENCOUNTER (INPATIENT)
Facility: HOSPITAL | Age: 59
LOS: 31 days | Discharge: NURSING, REHAB OR LONG-TERM CARE | DRG: 003 | End: 2024-12-23
Attending: EMERGENCY MEDICINE | Admitting: HOSPITALIST
Payer: COMMERCIAL

## 2024-11-21 ENCOUNTER — APPOINTMENT (OUTPATIENT)
Dept: GENERAL RADIOLOGY | Facility: HOSPITAL | Age: 59
DRG: 003 | End: 2024-11-21
Attending: THORACIC SURGERY (CARDIOTHORACIC VASCULAR SURGERY)
Payer: COMMERCIAL

## 2024-11-21 ENCOUNTER — ANESTHESIA EVENT (OUTPATIENT)
Dept: SURGERY | Facility: HOSPITAL | Age: 59
End: 2024-11-21
Payer: COMMERCIAL

## 2024-11-21 ENCOUNTER — APPOINTMENT (OUTPATIENT)
Dept: GENERAL RADIOLOGY | Facility: HOSPITAL | Age: 59
DRG: 003 | End: 2024-11-21
Attending: EMERGENCY MEDICINE
Payer: COMMERCIAL

## 2024-11-21 ENCOUNTER — HOSPITAL ENCOUNTER (INPATIENT)
Facility: HOSPITAL | Age: 59
LOS: 31 days | Discharge: NURSING, REHAB OR LONG-TERM CARE | End: 2024-12-23
Attending: EMERGENCY MEDICINE | Admitting: HOSPITALIST
Payer: COMMERCIAL

## 2024-11-21 ENCOUNTER — APPOINTMENT (OUTPATIENT)
Dept: GENERAL RADIOLOGY | Facility: HOSPITAL | Age: 59
End: 2024-11-21
Attending: EMERGENCY MEDICINE
Payer: COMMERCIAL

## 2024-11-21 DIAGNOSIS — Q25.40: ICD-10-CM

## 2024-11-21 DIAGNOSIS — F43.23 ADJUSTMENT DISORDER WITH MIXED ANXIETY AND DEPRESSED MOOD: ICD-10-CM

## 2024-11-21 DIAGNOSIS — F39 EPISODIC MOOD DISORDER: ICD-10-CM

## 2024-11-21 DIAGNOSIS — I71.010 DISSECTION OF ASCENDING AORTA (HCC): Primary | ICD-10-CM

## 2024-11-21 LAB
ALBUMIN SERPL-MCNC: 4.3 G/DL (ref 3.2–4.8)
ALP LIVER SERPL-CCNC: 90 U/L
ALT SERPL-CCNC: 22 U/L
ANION GAP SERPL CALC-SCNC: 9 MMOL/L (ref 0–18)
ANTIBODY SCREEN: NEGATIVE
APTT PPP: 29.7 SECONDS (ref 23–36)
AST SERPL-CCNC: 27 U/L (ref ?–34)
BASOPHILS # BLD AUTO: 0.02 X10(3) UL (ref 0–0.2)
BASOPHILS NFR BLD AUTO: 0.3 %
BILIRUB DIRECT SERPL-MCNC: 0.1 MG/DL (ref ?–0.3)
BILIRUB SERPL-MCNC: 0.4 MG/DL (ref 0.2–1.1)
BUN BLD-MCNC: 21 MG/DL (ref 9–23)
BUN/CREAT SERPL: 17.6 (ref 10–20)
CALCIUM BLD-MCNC: 9.6 MG/DL (ref 8.7–10.4)
CHLORIDE SERPL-SCNC: 109 MMOL/L (ref 98–112)
CO2 SERPL-SCNC: 28 MMOL/L (ref 21–32)
CREAT BLD-MCNC: 1.19 MG/DL
DEPRECATED RDW RBC AUTO: 48.6 FL (ref 35.1–46.3)
EGFRCR SERPLBLD CKD-EPI 2021: 52 ML/MIN/1.73M2 (ref 60–?)
EOSINOPHIL # BLD AUTO: 0.16 X10(3) UL (ref 0–0.7)
EOSINOPHIL NFR BLD AUTO: 2.5 %
ERYTHROCYTE [DISTWIDTH] IN BLOOD BY AUTOMATED COUNT: 15.9 % (ref 11–15)
GLUCOSE BLD-MCNC: 109 MG/DL (ref 70–99)
HCT VFR BLD AUTO: 38.3 %
HGB BLD-MCNC: 12.2 G/DL
IMM GRANULOCYTES # BLD AUTO: 0.02 X10(3) UL (ref 0–1)
IMM GRANULOCYTES NFR BLD: 0.3 %
INR BLD: 0.99 (ref 0.8–1.2)
LYMPHOCYTES # BLD AUTO: 2.03 X10(3) UL (ref 1–4)
LYMPHOCYTES NFR BLD AUTO: 31.7 %
MCH RBC QN AUTO: 26.6 PG (ref 26–34)
MCHC RBC AUTO-ENTMCNC: 31.9 G/DL (ref 31–37)
MCV RBC AUTO: 83.6 FL
MONOCYTES # BLD AUTO: 0.48 X10(3) UL (ref 0.1–1)
MONOCYTES NFR BLD AUTO: 7.5 %
NEUTROPHILS # BLD AUTO: 3.7 X10 (3) UL (ref 1.5–7.7)
NEUTROPHILS # BLD AUTO: 3.7 X10(3) UL (ref 1.5–7.7)
NEUTROPHILS NFR BLD AUTO: 57.7 %
NT-PROBNP SERPL-MCNC: 134 PG/ML (ref ?–125)
OSMOLALITY SERPL CALC.SUM OF ELEC: 306 MOSM/KG (ref 275–295)
PLATELET # BLD AUTO: 167 10(3)UL (ref 150–450)
POTASSIUM SERPL-SCNC: 3.4 MMOL/L (ref 3.5–5.1)
PROT SERPL-MCNC: 6.9 G/DL (ref 5.7–8.2)
PROTHROMBIN TIME: 13.7 SECONDS (ref 11.6–14.8)
RBC # BLD AUTO: 4.58 X10(6)UL
RH BLOOD TYPE: POSITIVE
RH BLOOD TYPE: POSITIVE
SARS-COV-2 RNA RESP QL NAA+PROBE: NOT DETECTED
SODIUM SERPL-SCNC: 146 MMOL/L (ref 136–145)
TROPONIN I SERPL HS-MCNC: 12 NG/L
WBC # BLD AUTO: 6.4 X10(3) UL (ref 4–11)

## 2024-11-21 PROCEDURE — B24BZZ4 ULTRASONOGRAPHY OF HEART WITH AORTA, TRANSESOPHAGEAL: ICD-10-PCS | Performed by: THORACIC SURGERY (CARDIOTHORACIC VASCULAR SURGERY)

## 2024-11-21 PROCEDURE — 71045 X-RAY EXAM CHEST 1 VIEW: CPT | Performed by: EMERGENCY MEDICINE

## 2024-11-21 PROCEDURE — 71045 X-RAY EXAM CHEST 1 VIEW: CPT | Performed by: THORACIC SURGERY (CARDIOTHORACIC VASCULAR SURGERY)

## 2024-11-21 PROCEDURE — 02RX0JZ REPLACEMENT OF THORACIC AORTA, ASCENDING/ARCH WITH SYNTHETIC SUBSTITUTE, OPEN APPROACH: ICD-10-PCS | Performed by: THORACIC SURGERY (CARDIOTHORACIC VASCULAR SURGERY)

## 2024-11-21 PROCEDURE — 74175 CTA ABDOMEN W/CONTRAST: CPT | Performed by: EMERGENCY MEDICINE

## 2024-11-21 PROCEDURE — 0BH17EZ INSERTION OF ENDOTRACHEAL AIRWAY INTO TRACHEA, VIA NATURAL OR ARTIFICIAL OPENING: ICD-10-PCS | Performed by: ANESTHESIOLOGY

## 2024-11-21 PROCEDURE — 36430 TRANSFUSION BLD/BLD COMPNT: CPT | Performed by: ANESTHESIOLOGY

## 2024-11-21 PROCEDURE — 99223 1ST HOSP IP/OBS HIGH 75: CPT | Performed by: HOSPITALIST

## 2024-11-21 PROCEDURE — 5A1221Z PERFORMANCE OF CARDIAC OUTPUT, CONTINUOUS: ICD-10-PCS | Performed by: THORACIC SURGERY (CARDIOTHORACIC VASCULAR SURGERY)

## 2024-11-21 PROCEDURE — 71275 CT ANGIOGRAPHY CHEST: CPT | Performed by: EMERGENCY MEDICINE

## 2024-11-21 PROCEDURE — 5A1955Z RESPIRATORY VENTILATION, GREATER THAN 96 CONSECUTIVE HOURS: ICD-10-PCS | Performed by: ANESTHESIOLOGY

## 2024-11-21 DEVICE — STERNALPLATE, X: Type: IMPLANTABLE DEVICE | Site: CHEST | Status: FUNCTIONAL

## 2024-11-21 DEVICE — HEMASHIELD PLATINUM WOVEN STRAIGHT DOUBLE VELOUR VASCULAR GRAFT WITH GRAFT SIZER ACCESSORY
Type: IMPLANTABLE DEVICE | Site: HEART | Status: FUNCTIONAL
Brand: HEMASHIELD

## 2024-11-21 DEVICE — LOCKING SCREW,AXS,SELF-DRILLING
Type: IMPLANTABLE DEVICE | Site: CHEST | Status: FUNCTIONAL
Brand: AXS, SMARTLOCK

## 2024-11-21 DEVICE — BARD® PTFE FELT, 15.2 CM X 15.2 CM
Type: IMPLANTABLE DEVICE | Site: HEART | Status: FUNCTIONAL
Brand: BARD® PTFE FELT

## 2024-11-21 DEVICE — STERNALPLATE, L: Type: IMPLANTABLE DEVICE | Site: CHEST | Status: FUNCTIONAL

## 2024-11-21 RX ORDER — EPHEDRINE SULFATE 50 MG/ML
INJECTION INTRAVENOUS AS NEEDED
Status: DISCONTINUED | OUTPATIENT
Start: 2024-11-21 | End: 2024-11-22 | Stop reason: SURG

## 2024-11-21 RX ORDER — HEPARIN SODIUM 1000 [USP'U]/ML
INJECTION, SOLUTION INTRAVENOUS; SUBCUTANEOUS AS NEEDED
Status: DISCONTINUED | OUTPATIENT
Start: 2024-11-21 | End: 2024-11-22 | Stop reason: SURG

## 2024-11-21 RX ORDER — CALCIUM GLUCONATE 94 MG/ML
INJECTION, SOLUTION INTRAVENOUS AS NEEDED
Status: DISCONTINUED | OUTPATIENT
Start: 2024-11-21 | End: 2024-11-22 | Stop reason: SURG

## 2024-11-21 RX ORDER — DEXMEDETOMIDINE HYDROCHLORIDE 4 UG/ML
INJECTION, SOLUTION INTRAVENOUS CONTINUOUS
Status: DISCONTINUED | OUTPATIENT
Start: 2024-11-21 | End: 2024-11-22

## 2024-11-21 RX ORDER — SODIUM CHLORIDE 9 MG/ML
INJECTION, SOLUTION INTRAVENOUS CONTINUOUS PRN
Status: DISCONTINUED | OUTPATIENT
Start: 2024-11-21 | End: 2024-11-22 | Stop reason: SURG

## 2024-11-21 RX ORDER — CEFAZOLIN SODIUM 1 G/3ML
INJECTION, POWDER, FOR SOLUTION INTRAMUSCULAR; INTRAVENOUS AS NEEDED
Status: DISCONTINUED | OUTPATIENT
Start: 2024-11-21 | End: 2024-11-22 | Stop reason: HOSPADM

## 2024-11-21 RX ORDER — METOCLOPRAMIDE HYDROCHLORIDE 5 MG/ML
10 INJECTION INTRAMUSCULAR; INTRAVENOUS ONCE
Status: DISCONTINUED | OUTPATIENT
Start: 2024-11-21 | End: 2024-11-22 | Stop reason: HOSPADM

## 2024-11-21 RX ORDER — VANCOMYCIN HYDROCHLORIDE 1 G/20ML
INJECTION, POWDER, LYOPHILIZED, FOR SOLUTION INTRAVENOUS AS NEEDED
Status: DISCONTINUED | OUTPATIENT
Start: 2024-11-21 | End: 2024-11-22 | Stop reason: HOSPADM

## 2024-11-21 RX ORDER — MORPHINE SULFATE 2 MG/ML
2 INJECTION, SOLUTION INTRAMUSCULAR; INTRAVENOUS ONCE
Status: COMPLETED | OUTPATIENT
Start: 2024-11-21 | End: 2024-11-21

## 2024-11-21 RX ORDER — METOCLOPRAMIDE 10 MG/1
10 TABLET ORAL ONCE
Status: DISCONTINUED | OUTPATIENT
Start: 2024-11-21 | End: 2024-11-22 | Stop reason: HOSPADM

## 2024-11-21 RX ORDER — ROCURONIUM BROMIDE 10 MG/ML
INJECTION, SOLUTION INTRAVENOUS AS NEEDED
Status: DISCONTINUED | OUTPATIENT
Start: 2024-11-21 | End: 2024-11-22 | Stop reason: SURG

## 2024-11-21 RX ORDER — SODIUM CHLORIDE 9 MG/ML
1000 INJECTION, SOLUTION INTRAVENOUS ONCE
Status: COMPLETED | OUTPATIENT
Start: 2024-11-21 | End: 2024-11-21

## 2024-11-21 RX ORDER — ONDANSETRON 2 MG/ML
4 INJECTION INTRAMUSCULAR; INTRAVENOUS ONCE
Status: DISCONTINUED | OUTPATIENT
Start: 2024-11-21 | End: 2024-11-21

## 2024-11-21 RX ORDER — DOBUTAMINE HYDROCHLORIDE 200 MG/100ML
INJECTION INTRAVENOUS CONTINUOUS PRN
Status: DISCONTINUED | OUTPATIENT
Start: 2024-11-21 | End: 2024-11-22 | Stop reason: SURG

## 2024-11-21 RX ORDER — ONDANSETRON 2 MG/ML
INJECTION INTRAMUSCULAR; INTRAVENOUS AS NEEDED
Status: DISCONTINUED | OUTPATIENT
Start: 2024-11-21 | End: 2024-11-22 | Stop reason: SURG

## 2024-11-21 RX ORDER — METOPROLOL TARTRATE 25 MG/1
25 TABLET, FILM COATED ORAL ONCE AS NEEDED
Status: ACTIVE | OUTPATIENT
Start: 2024-11-21 | End: 2024-11-21

## 2024-11-21 RX ORDER — ONDANSETRON 2 MG/ML
4 INJECTION INTRAMUSCULAR; INTRAVENOUS ONCE
Status: COMPLETED | OUTPATIENT
Start: 2024-11-21 | End: 2024-11-21

## 2024-11-21 RX ORDER — SENNOSIDES 8.6 MG
1300 CAPSULE ORAL DAILY PRN
COMMUNITY
End: 2024-12-23

## 2024-11-21 RX ORDER — PROTAMINE SULFATE 10 MG/ML
INJECTION, SOLUTION INTRAVENOUS AS NEEDED
Status: DISCONTINUED | OUTPATIENT
Start: 2024-11-21 | End: 2024-11-22 | Stop reason: SURG

## 2024-11-21 RX ORDER — GLYCOPYRROLATE 0.2 MG/ML
INJECTION, SOLUTION INTRAMUSCULAR; INTRAVENOUS AS NEEDED
Status: DISCONTINUED | OUTPATIENT
Start: 2024-11-21 | End: 2024-11-22 | Stop reason: SURG

## 2024-11-21 RX ORDER — FAMOTIDINE 10 MG/ML
20 INJECTION, SOLUTION INTRAVENOUS ONCE
Status: DISCONTINUED | OUTPATIENT
Start: 2024-11-21 | End: 2024-11-22 | Stop reason: HOSPADM

## 2024-11-21 RX ORDER — FAMOTIDINE 20 MG/1
20 TABLET, FILM COATED ORAL ONCE
Status: DISCONTINUED | OUTPATIENT
Start: 2024-11-21 | End: 2024-11-22 | Stop reason: HOSPADM

## 2024-11-21 RX ORDER — ONDANSETRON 2 MG/ML
INJECTION INTRAMUSCULAR; INTRAVENOUS
Status: COMPLETED
Start: 2024-11-21 | End: 2024-11-21

## 2024-11-21 RX ADMIN — EPHEDRINE SULFATE 10 MG: 50 INJECTION INTRAVENOUS at 19:46:00

## 2024-11-21 RX ADMIN — DEXMEDETOMIDINE HYDROCHLORIDE 0.6 MCG/KG/HR: 4 INJECTION, SOLUTION INTRAVENOUS at 19:40:00

## 2024-11-21 RX ADMIN — PROTAMINE SULFATE 460 MG: 10 INJECTION, SOLUTION INTRAVENOUS at 23:31:00

## 2024-11-21 RX ADMIN — CALCIUM GLUCONATE 1 G: 94 INJECTION, SOLUTION INTRAVENOUS at 23:29:00

## 2024-11-21 RX ADMIN — GLYCOPYRROLATE 0.4 MG: 0.2 INJECTION, SOLUTION INTRAMUSCULAR; INTRAVENOUS at 19:35:00

## 2024-11-21 RX ADMIN — HEPARIN SODIUM 46000 UNITS: 1000 INJECTION, SOLUTION INTRAVENOUS; SUBCUTANEOUS at 20:43:00

## 2024-11-21 RX ADMIN — ONDANSETRON 4 MG: 2 INJECTION INTRAMUSCULAR; INTRAVENOUS at 19:35:00

## 2024-11-21 RX ADMIN — DOBUTAMINE HYDROCHLORIDE 2 MCG/KG/MIN: 200 INJECTION INTRAVENOUS at 22:32:00

## 2024-11-21 RX ADMIN — SODIUM CHLORIDE: 9 INJECTION, SOLUTION INTRAVENOUS at 19:29:00

## 2024-11-21 RX ADMIN — ROCURONIUM BROMIDE 100 MG: 10 INJECTION, SOLUTION INTRAVENOUS at 19:41:00

## 2024-11-21 NOTE — ED INITIAL ASSESSMENT (HPI)
Pt presents to ED via [facility] for CP that started 30min PTA. Pt hypotensive for EMS. pT a&OX4

## 2024-11-22 ENCOUNTER — APPOINTMENT (OUTPATIENT)
Dept: GENERAL RADIOLOGY | Facility: HOSPITAL | Age: 59
End: 2024-11-22
Attending: NURSE PRACTITIONER
Payer: COMMERCIAL

## 2024-11-22 ENCOUNTER — APPOINTMENT (OUTPATIENT)
Dept: GENERAL RADIOLOGY | Facility: HOSPITAL | Age: 59
DRG: 003 | End: 2024-11-22
Attending: NURSE PRACTITIONER
Payer: COMMERCIAL

## 2024-11-22 ENCOUNTER — APPOINTMENT (OUTPATIENT)
Dept: CV DIAGNOSTICS | Facility: HOSPITAL | Age: 59
DRG: 003 | End: 2024-11-22
Attending: INTERNAL MEDICINE
Payer: COMMERCIAL

## 2024-11-22 ENCOUNTER — APPOINTMENT (OUTPATIENT)
Dept: GENERAL RADIOLOGY | Facility: HOSPITAL | Age: 59
End: 2024-11-22
Attending: THORACIC SURGERY (CARDIOTHORACIC VASCULAR SURGERY)
Payer: COMMERCIAL

## 2024-11-22 ENCOUNTER — APPOINTMENT (OUTPATIENT)
Dept: GENERAL RADIOLOGY | Facility: HOSPITAL | Age: 59
DRG: 003 | End: 2024-11-22
Attending: THORACIC SURGERY (CARDIOTHORACIC VASCULAR SURGERY)
Payer: COMMERCIAL

## 2024-11-22 ENCOUNTER — APPOINTMENT (OUTPATIENT)
Dept: CV DIAGNOSTICS | Facility: HOSPITAL | Age: 59
End: 2024-11-22
Attending: INTERNAL MEDICINE
Payer: COMMERCIAL

## 2024-11-22 PROBLEM — I71.010 DISSECTION OF ASCENDING AORTA (HCC): Status: ACTIVE | Noted: 2024-11-22

## 2024-11-22 LAB
ANION GAP SERPL CALC-SCNC: 11 MMOL/L (ref 0–18)
APTT PPP: 28.2 SECONDS (ref 23–36)
ATRIAL RATE: 63 BPM
ATRIAL RATE: 65 BPM
ATRIAL RATE: 75 BPM
BASE EXCESS BLD CALC-SCNC: -2.6 MMOL/L (ref ?–2)
BASE EXCESS BLD CALC-SCNC: -5 MMOL/L (ref ?–2)
BASE EXCESS BLD CALC-SCNC: -5.8 MMOL/L (ref ?–2)
BASE EXCESS BLDA CALC-SCNC: -1 MMOL/L (ref ?–30)
BASE EXCESS BLDA CALC-SCNC: -2 MMOL/L (ref ?–30)
BASE EXCESS BLDA CALC-SCNC: -3 MMOL/L (ref ?–30)
BASE EXCESS BLDA CALC-SCNC: -4 MMOL/L (ref ?–30)
BASE EXCESS BLDA CALC-SCNC: -4 MMOL/L (ref ?–30)
BLOOD TYPE BARCODE: 7300
BUN BLD-MCNC: 20 MG/DL (ref 9–23)
BUN BLD-MCNC: 24 MG/DL (ref 9–23)
BUN/CREAT SERPL: 15 (ref 10–20)
CA-I BLD-SCNC: 1.13 MMOL/L (ref 0.95–1.32)
CA-I BLD-SCNC: 1.19 MMOL/L (ref 0.95–1.32)
CA-I BLDA-SCNC: 1.06 MMOL/L (ref 1.12–1.32)
CA-I BLDA-SCNC: 1.06 MMOL/L (ref 1.12–1.32)
CA-I BLDA-SCNC: 1.07 MMOL/L (ref 1.12–1.32)
CA-I BLDA-SCNC: 1.12 MMOL/L (ref 1.12–1.32)
CA-I BLDA-SCNC: 1.18 MMOL/L (ref 1.12–1.32)
CALCIUM BLD-MCNC: 8.7 MG/DL (ref 8.7–10.4)
CALCIUM BLD-MCNC: 8.8 MG/DL (ref 8.7–10.4)
CHLORIDE SERPL-SCNC: 111 MMOL/L (ref 98–112)
CHLORIDE SERPL-SCNC: 112 MMOL/L (ref 98–112)
CO2 BLDA-SCNC: 22 MMOL/L (ref 22–32)
CO2 BLDA-SCNC: 23 MMOL/L (ref 22–32)
CO2 BLDA-SCNC: 23 MMOL/L (ref 22–32)
CO2 BLDA-SCNC: 24 MMOL/L (ref 22–32)
CO2 BLDA-SCNC: 24 MMOL/L (ref 22–32)
CO2 SERPL-SCNC: 20 MMOL/L (ref 21–32)
CO2 SERPL-SCNC: 22 MMOL/L (ref 21–32)
COHGB MFR BLD: 2 % (ref 0–3)
COHGB MFR BLD: 2.1 % (ref 0–3)
CREAT BLD-MCNC: 1.25 MG/DL
CREAT BLD-MCNC: 1.6 MG/DL
DEPRECATED RDW RBC AUTO: 47.6 FL (ref 35.1–46.3)
DEPRECATED RDW RBC AUTO: 48.8 FL (ref 35.1–46.3)
EGFRCR SERPLBLD CKD-EPI 2021: 36 ML/MIN/1.73M2 (ref 60–?)
EGFRCR SERPLBLD CKD-EPI 2021: 49 ML/MIN/1.73M2 (ref 60–?)
ERYTHROCYTE [DISTWIDTH] IN BLOOD BY AUTOMATED COUNT: 15.9 % (ref 11–15)
ERYTHROCYTE [DISTWIDTH] IN BLOOD BY AUTOMATED COUNT: 16 % (ref 11–15)
FIBRINOGEN PPP-MCNC: 306 MG/DL (ref 200–480)
GLUCOSE BLD-MCNC: 180 MG/DL (ref 70–99)
GLUCOSE BLD-MCNC: 194 MG/DL (ref 70–99)
GLUCOSE BLDA-MCNC: 115 MG/DL (ref 70–99)
GLUCOSE BLDA-MCNC: 126 MG/DL (ref 70–99)
GLUCOSE BLDA-MCNC: 130 MG/DL (ref 70–99)
GLUCOSE BLDA-MCNC: 131 MG/DL (ref 70–99)
GLUCOSE BLDA-MCNC: 151 MG/DL (ref 70–99)
GLUCOSE BLDC GLUCOMTR-MCNC: 104 MG/DL (ref 70–99)
GLUCOSE BLDC GLUCOMTR-MCNC: 130 MG/DL (ref 70–99)
GLUCOSE BLDC GLUCOMTR-MCNC: 130 MG/DL (ref 70–99)
GLUCOSE BLDC GLUCOMTR-MCNC: 133 MG/DL (ref 70–99)
GLUCOSE BLDC GLUCOMTR-MCNC: 139 MG/DL (ref 70–99)
GLUCOSE BLDC GLUCOMTR-MCNC: 143 MG/DL (ref 70–99)
GLUCOSE BLDC GLUCOMTR-MCNC: 143 MG/DL (ref 70–99)
GLUCOSE BLDC GLUCOMTR-MCNC: 145 MG/DL (ref 70–99)
GLUCOSE BLDC GLUCOMTR-MCNC: 147 MG/DL (ref 70–99)
GLUCOSE BLDC GLUCOMTR-MCNC: 148 MG/DL (ref 70–99)
GLUCOSE BLDC GLUCOMTR-MCNC: 153 MG/DL (ref 70–99)
GLUCOSE BLDC GLUCOMTR-MCNC: 160 MG/DL (ref 70–99)
GLUCOSE BLDC GLUCOMTR-MCNC: 161 MG/DL (ref 70–99)
GLUCOSE BLDC GLUCOMTR-MCNC: 174 MG/DL (ref 70–99)
GLUCOSE BLDC GLUCOMTR-MCNC: 190 MG/DL (ref 70–99)
GLUCOSE BLDC GLUCOMTR-MCNC: 193 MG/DL (ref 70–99)
GLUCOSE BLDC GLUCOMTR-MCNC: 196 MG/DL (ref 70–99)
GLUCOSE BLDC GLUCOMTR-MCNC: 197 MG/DL (ref 70–99)
GLUCOSE BLDC GLUCOMTR-MCNC: 197 MG/DL (ref 70–99)
GLUCOSE BLDC GLUCOMTR-MCNC: 198 MG/DL (ref 70–99)
GLUCOSE BLDC GLUCOMTR-MCNC: 201 MG/DL (ref 70–99)
HCO3 BLDA-SCNC: 20.4 MEQ/L (ref 21–27)
HCO3 BLDA-SCNC: 20.9 MEQ/L (ref 21–27)
HCO3 BLDA-SCNC: 21 MEQ/L (ref 22–26)
HCO3 BLDA-SCNC: 22.2 MEQ/L (ref 22–26)
HCO3 BLDA-SCNC: 22.3 MEQ/L (ref 22–26)
HCO3 BLDA-SCNC: 22.9 MEQ/L (ref 21–27)
HCO3 BLDA-SCNC: 22.9 MEQ/L (ref 22–26)
HCO3 BLDA-SCNC: 22.9 MEQ/L (ref 22–26)
HCT VFR BLD AUTO: 28 %
HCT VFR BLD AUTO: 32.7 %
HCT VFR BLDA CALC: 24 %
HCT VFR BLDA CALC: 25 %
HCT VFR BLDA CALC: 26 %
HCT VFR BLDA CALC: 26 %
HCT VFR BLDA CALC: 37 %
HGB BLD-MCNC: 10.6 G/DL
HGB BLD-MCNC: 11.4 G/DL
HGB BLD-MCNC: 9.3 G/DL
HGB BLD-MCNC: 9.6 G/DL
INR BLD: 1.25 (ref 0.8–1.2)
ISTAT ACTIVATED CLOTTING TIME: 134 SECONDS (ref 125–137)
ISTAT ACTIVATED CLOTTING TIME: 147 SECONDS (ref 125–137)
ISTAT ACTIVATED CLOTTING TIME: 452 SECONDS (ref 125–137)
ISTAT ACTIVATED CLOTTING TIME: 611 SECONDS (ref 125–137)
ISTAT ACTIVATED CLOTTING TIME: 666 SECONDS (ref 125–137)
ISTAT ACTIVATED CLOTTING TIME: >675 SECONDS (ref 125–137)
LACTATE BLD-SCNC: 3.2 MMOL/L (ref 0.5–2)
LACTATE BLD-SCNC: 3.2 MMOL/L (ref 0.5–2)
MAGNESIUM SERPL-MCNC: 2.3 MG/DL (ref 1.6–2.6)
MAGNESIUM SERPL-MCNC: 2.3 MG/DL (ref 1.6–2.6)
MCH RBC QN AUTO: 27 PG (ref 26–34)
MCH RBC QN AUTO: 27.5 PG (ref 26–34)
MCHC RBC AUTO-ENTMCNC: 32.4 G/DL (ref 31–37)
MCHC RBC AUTO-ENTMCNC: 33.2 G/DL (ref 31–37)
MCV RBC AUTO: 82.8 FL
MCV RBC AUTO: 83.2 FL
METHGB MFR BLD: 0.7 % SAT (ref 0.4–1.5)
METHGB MFR BLD: 1.4 % SAT (ref 0.4–1.5)
O2 CT BLD-SCNC: 12.9 VOL% (ref 15–23)
O2 CT BLD-SCNC: 14.3 VOL% (ref 15–23)
O2 CT BLD-SCNC: 14.8 VOL% (ref 15–23)
O2/TOTAL GAS SETTING VFR VENT: 100 %
O2/TOTAL GAS SETTING VFR VENT: 50 %
O2/TOTAL GAS SETTING VFR VENT: 50 %
OSMOLALITY SERPL CALC.SUM OF ELEC: 305 MOSM/KG (ref 275–295)
P AXIS: 41 DEGREES
P AXIS: 44 DEGREES
P AXIS: 54 DEGREES
P-R INTERVAL: 256 MS
PCO2 BLDA: 29.7 MMHG (ref 35–45)
PCO2 BLDA: 32.7 MMHG (ref 35–45)
PCO2 BLDA: 32.9 MMHG (ref 35–45)
PCO2 BLDA: 34 MM HG (ref 35–45)
PCO2 BLDA: 35 MM HG (ref 35–45)
PCO2 BLDA: 36 MM HG (ref 35–45)
PCO2 BLDA: 44.2 MMHG (ref 35–45)
PCO2 BLDA: 48.7 MMHG (ref 35–45)
PEEP SETTING VENT: 10 CM H2O
PEEP SETTING VENT: 5 CM H2O
PEEP SETTING VENT: 7 CM H2O
PH BLDA: 7.28 [PH] (ref 7.35–7.45)
PH BLDA: 7.32 [PH] (ref 7.35–7.45)
PH BLDA: 7.34 [PH] (ref 7.35–7.45)
PH BLDA: 7.36 [PH] (ref 7.35–7.45)
PH BLDA: 7.41 [PH] (ref 7.35–7.45)
PH BLDA: 7.42 [PH] (ref 7.35–7.45)
PH BLDA: 7.44 [PH] (ref 7.35–7.45)
PH BLDA: 7.48 [PH] (ref 7.35–7.45)
PLATELET # BLD AUTO: 171 10(3)UL (ref 150–450)
PLATELET # BLD AUTO: 201 10(3)UL (ref 150–450)
PO2 BLDA: 204 MMHG (ref 80–105)
PO2 BLDA: 331 MMHG (ref 80–105)
PO2 BLDA: 391 MMHG (ref 80–105)
PO2 BLDA: 394 MMHG (ref 80–105)
PO2 BLDA: 399 MMHG (ref 80–105)
PO2 BLDA: 66 MM HG (ref 80–100)
PO2 BLDA: 76 MM HG (ref 80–100)
PO2 BLDA: 95 MM HG (ref 80–100)
POTASSIUM BLD-SCNC: 3.4 MMOL/L (ref 3.6–5.1)
POTASSIUM BLD-SCNC: 3.9 MMOL/L (ref 3.6–5.1)
POTASSIUM SERPL-SCNC: 4 MMOL/L (ref 3.5–5.1)
POTASSIUM SERPL-SCNC: 4 MMOL/L (ref 3.5–5.1)
PROTHROMBIN TIME: 16.4 SECONDS (ref 11.6–14.8)
PUNCTURE CHARGE: NO
Q-T INTERVAL: 444 MS
Q-T INTERVAL: 466 MS
Q-T INTERVAL: 478 MS
QRS DURATION: 82 MS
QRS DURATION: 90 MS
QRS DURATION: 94 MS
QTC CALCULATION (BEZET): 409 MS
QTC CALCULATION (BEZET): 420 MS
QTC CALCULATION (BEZET): 533 MS
R AXIS: -5 DEGREES
R AXIS: 4 DEGREES
R AXIS: 5 DEGREES
RBC # BLD AUTO: 3.38 X10(6)UL
RBC # BLD AUTO: 3.93 X10(6)UL
RESP RATE: 12 BPM
RESP RATE: 14 BPM
SAO2 % BLDA: 100 % (ref 92–100)
SAO2 % BLDA: 95.4 % (ref 94–100)
SAO2 % BLDA: 97.9 % (ref 94–100)
SAO2 % BLDA: 98.8 % (ref 94–100)
SODIUM BLD-SCNC: 138 MMOL/L (ref 135–145)
SODIUM BLD-SCNC: 140 MMOL/L (ref 135–145)
SODIUM BLDA-SCNC: 135 MMOL/L (ref 136–145)
SODIUM BLDA-SCNC: 138 MMOL/L (ref 136–145)
SODIUM BLDA-SCNC: 140 MMOL/L (ref 136–145)
SODIUM BLDA-SCNC: 140 MMOL/L (ref 136–145)
SODIUM BLDA-SCNC: 142 MMOL/L (ref 136–145)
SODIUM BLDA-SCNC: 3.4 MMOL/L (ref 3.6–5.1)
SODIUM BLDA-SCNC: 3.5 MMOL/L (ref 3.6–5.1)
SODIUM BLDA-SCNC: 3.7 MMOL/L (ref 3.6–5.1)
SODIUM BLDA-SCNC: 4 MMOL/L (ref 3.6–5.1)
SODIUM BLDA-SCNC: 5.7 MMOL/L (ref 3.6–5.1)
SODIUM SERPL-SCNC: 143 MMOL/L (ref 136–145)
SODIUM SERPL-SCNC: 143 MMOL/L (ref 136–145)
SPECIMEN VOL 24H UR: 450 ML
SPECIMEN VOL 24H UR: 550 ML
SPECIMEN VOL 24H UR: 550 ML
T AXIS: -13 DEGREES
T AXIS: -18 DEGREES
T AXIS: 110 DEGREES
UNIT VOLUME: 263 ML
VENTRICULAR RATE: 49 BPM
VENTRICULAR RATE: 51 BPM
VENTRICULAR RATE: 75 BPM
WBC # BLD AUTO: 10.3 X10(3) UL (ref 4–11)
WBC # BLD AUTO: 14.2 X10(3) UL (ref 4–11)

## 2024-11-22 PROCEDURE — 99233 SBSQ HOSP IP/OBS HIGH 50: CPT | Performed by: HOSPITALIST

## 2024-11-22 PROCEDURE — 31500 INSERT EMERGENCY AIRWAY: CPT | Performed by: HOSPITALIST

## 2024-11-22 PROCEDURE — 31500 INSERT EMERGENCY AIRWAY: CPT | Performed by: INTERNAL MEDICINE

## 2024-11-22 PROCEDURE — 71045 X-RAY EXAM CHEST 1 VIEW: CPT | Performed by: NURSE PRACTITIONER

## 2024-11-22 PROCEDURE — HZ2ZZZZ DETOXIFICATION SERVICES FOR SUBSTANCE ABUSE TREATMENT: ICD-10-PCS | Performed by: HOSPITALIST

## 2024-11-22 PROCEDURE — 93312 ECHO TRANSESOPHAGEAL: CPT | Performed by: ANESTHESIOLOGY

## 2024-11-22 PROCEDURE — 71045 X-RAY EXAM CHEST 1 VIEW: CPT | Performed by: THORACIC SURGERY (CARDIOTHORACIC VASCULAR SURGERY)

## 2024-11-22 PROCEDURE — 99291 CRITICAL CARE FIRST HOUR: CPT | Performed by: INTERNAL MEDICINE

## 2024-11-22 RX ORDER — DOBUTAMINE HYDROCHLORIDE 200 MG/100ML
INJECTION INTRAVENOUS CONTINUOUS PRN
Status: DISCONTINUED | OUTPATIENT
Start: 2024-11-22 | End: 2024-11-24

## 2024-11-22 RX ORDER — BISACODYL 10 MG
10 SUPPOSITORY, RECTAL RECTAL
Status: DISCONTINUED | OUTPATIENT
Start: 2024-11-22 | End: 2024-12-11

## 2024-11-22 RX ORDER — METOCLOPRAMIDE HYDROCHLORIDE 5 MG/ML
5 INJECTION INTRAMUSCULAR; INTRAVENOUS EVERY 6 HOURS
Status: DISCONTINUED | OUTPATIENT
Start: 2024-11-22 | End: 2024-11-25

## 2024-11-22 RX ORDER — ACETAMINOPHEN 10 MG/ML
1000 INJECTION, SOLUTION INTRAVENOUS EVERY 8 HOURS
Status: COMPLETED | OUTPATIENT
Start: 2024-11-22 | End: 2024-11-23

## 2024-11-22 RX ORDER — DOXEPIN HYDROCHLORIDE 50 MG/1
1 CAPSULE ORAL DAILY
Status: DISCONTINUED | OUTPATIENT
Start: 2024-11-23 | End: 2024-11-23

## 2024-11-22 RX ORDER — FAMOTIDINE 10 MG/ML
20 INJECTION, SOLUTION INTRAVENOUS DAILY
Status: DISCONTINUED | OUTPATIENT
Start: 2024-11-23 | End: 2024-12-16

## 2024-11-22 RX ORDER — MIDAZOLAM HYDROCHLORIDE 1 MG/ML
INJECTION INTRAMUSCULAR; INTRAVENOUS
Status: COMPLETED
Start: 2024-11-22 | End: 2024-11-22

## 2024-11-22 RX ORDER — FAMOTIDINE 20 MG/1
20 TABLET, FILM COATED ORAL DAILY
Status: DISCONTINUED | OUTPATIENT
Start: 2024-11-23 | End: 2024-12-04

## 2024-11-22 RX ORDER — ASPIRIN 81 MG/1
81 TABLET, CHEWABLE ORAL DAILY
Status: DISCONTINUED | OUTPATIENT
Start: 2024-11-22 | End: 2024-12-04

## 2024-11-22 RX ORDER — DOCUSATE SODIUM 100 MG/1
100 CAPSULE, LIQUID FILLED ORAL 2 TIMES DAILY
Status: DISCONTINUED | OUTPATIENT
Start: 2024-11-22 | End: 2024-11-25

## 2024-11-22 RX ORDER — POTASSIUM CHLORIDE 29.8 MG/ML
40 INJECTION INTRAVENOUS AS NEEDED
Status: DISCONTINUED | OUTPATIENT
Start: 2024-11-22 | End: 2024-12-23

## 2024-11-22 RX ORDER — SENNOSIDES 8.6 MG
8.6 TABLET ORAL 2 TIMES DAILY
Status: DISCONTINUED | OUTPATIENT
Start: 2024-11-22 | End: 2024-11-27

## 2024-11-22 RX ORDER — MAGNESIUM SULFATE HEPTAHYDRATE 40 MG/ML
2 INJECTION, SOLUTION INTRAVENOUS AS NEEDED
Status: DISCONTINUED | OUTPATIENT
Start: 2024-11-22 | End: 2024-12-23

## 2024-11-22 RX ORDER — MAGNESIUM SULFATE 1 G/100ML
1 INJECTION INTRAVENOUS AS NEEDED
Status: DISCONTINUED | OUTPATIENT
Start: 2024-11-22 | End: 2024-12-23

## 2024-11-22 RX ORDER — ALBUMIN HUMAN 50 G/1000ML
12.5 SOLUTION INTRAVENOUS ONCE AS NEEDED
Status: DISPENSED | OUTPATIENT
Start: 2024-11-22 | End: 2024-11-25

## 2024-11-22 RX ORDER — FAMOTIDINE 20 MG/1
20 TABLET, FILM COATED ORAL 2 TIMES DAILY
Status: DISCONTINUED | OUTPATIENT
Start: 2024-11-22 | End: 2024-11-22

## 2024-11-22 RX ORDER — CHLORHEXIDINE GLUCONATE ORAL RINSE 1.2 MG/ML
15 SOLUTION DENTAL 2 TIMES DAILY
Status: DISCONTINUED | OUTPATIENT
Start: 2024-11-22 | End: 2024-12-23

## 2024-11-22 RX ORDER — SODIUM CHLORIDE 9 MG/ML
83 INJECTION, SOLUTION INTRAVENOUS CONTINUOUS
Status: DISCONTINUED | OUTPATIENT
Start: 2024-11-22 | End: 2024-11-22

## 2024-11-22 RX ORDER — ONDANSETRON 2 MG/ML
4 INJECTION INTRAMUSCULAR; INTRAVENOUS EVERY 6 HOURS PRN
Status: DISCONTINUED | OUTPATIENT
Start: 2024-11-22 | End: 2024-12-05

## 2024-11-22 RX ORDER — FAMOTIDINE 10 MG/ML
20 INJECTION, SOLUTION INTRAVENOUS 2 TIMES DAILY
Status: DISCONTINUED | OUTPATIENT
Start: 2024-11-22 | End: 2024-11-22

## 2024-11-22 RX ORDER — POTASSIUM CHLORIDE 14.9 MG/ML
20 INJECTION INTRAVENOUS AS NEEDED
Status: DISCONTINUED | OUTPATIENT
Start: 2024-11-22 | End: 2024-12-23

## 2024-11-22 RX ORDER — ASCORBIC ACID 500 MG
500 TABLET ORAL 3 TIMES DAILY
Status: DISCONTINUED | OUTPATIENT
Start: 2024-11-23 | End: 2024-11-22

## 2024-11-22 RX ORDER — FUROSEMIDE 10 MG/ML
20 INJECTION INTRAMUSCULAR; INTRAVENOUS ONCE
Status: COMPLETED | OUTPATIENT
Start: 2024-11-22 | End: 2024-11-22

## 2024-11-22 RX ORDER — MORPHINE SULFATE 2 MG/ML
2 INJECTION, SOLUTION INTRAMUSCULAR; INTRAVENOUS EVERY 2 HOUR PRN
Status: DISCONTINUED | OUTPATIENT
Start: 2024-11-22 | End: 2024-12-09

## 2024-11-22 RX ORDER — NITROGLYCERIN 20 MG/100ML
INJECTION INTRAVENOUS CONTINUOUS PRN
Status: DISCONTINUED | OUTPATIENT
Start: 2024-11-22 | End: 2024-11-29

## 2024-11-22 RX ORDER — DEXTROSE MONOHYDRATE AND SODIUM CHLORIDE 5; .45 G/100ML; G/100ML
INJECTION, SOLUTION INTRAVENOUS CONTINUOUS
Status: DISCONTINUED | OUTPATIENT
Start: 2024-11-22 | End: 2024-11-29

## 2024-11-22 RX ORDER — ASCORBIC ACID 500 MG
500 TABLET ORAL DAILY
Status: DISCONTINUED | OUTPATIENT
Start: 2024-11-23 | End: 2024-11-23

## 2024-11-22 RX ORDER — METOCLOPRAMIDE HYDROCHLORIDE 5 MG/ML
10 INJECTION INTRAMUSCULAR; INTRAVENOUS EVERY 6 HOURS
Status: DISCONTINUED | OUTPATIENT
Start: 2024-11-22 | End: 2024-11-22

## 2024-11-22 RX ORDER — MORPHINE SULFATE 4 MG/ML
4 INJECTION, SOLUTION INTRAMUSCULAR; INTRAVENOUS EVERY 2 HOUR PRN
Status: DISCONTINUED | OUTPATIENT
Start: 2024-11-22 | End: 2024-11-24

## 2024-11-22 RX ORDER — POLYETHYLENE GLYCOL 3350 17 G/17G
17 POWDER, FOR SOLUTION ORAL DAILY PRN
Status: DISCONTINUED | OUTPATIENT
Start: 2024-11-22 | End: 2024-12-04

## 2024-11-22 RX ORDER — METOPROLOL TARTRATE 25 MG/1
25 TABLET, FILM COATED ORAL
Status: DISCONTINUED | OUTPATIENT
Start: 2024-11-22 | End: 2024-11-29

## 2024-11-22 RX ORDER — PROTAMINE SULFATE 10 MG/ML
25 INJECTION, SOLUTION INTRAVENOUS ONCE
Status: COMPLETED | OUTPATIENT
Start: 2024-11-22 | End: 2024-11-22

## 2024-11-22 RX ORDER — DEXMEDETOMIDINE HYDROCHLORIDE 4 UG/ML
INJECTION, SOLUTION INTRAVENOUS CONTINUOUS
Status: DISCONTINUED | OUTPATIENT
Start: 2024-11-22 | End: 2024-12-10

## 2024-11-22 RX ADMIN — SODIUM CHLORIDE: 9 INJECTION, SOLUTION INTRAVENOUS at 01:36:00

## 2024-11-22 RX ADMIN — ROCURONIUM BROMIDE 50 MG: 10 INJECTION, SOLUTION INTRAVENOUS at 00:47:00

## 2024-11-22 NOTE — ANESTHESIA POSTPROCEDURE EVALUATION
Patient: Alisha Wilde    Procedure Summary       Date: 11/21/24 Room / Location: [facility] MAIN OR  / [facility] MAIN OR    Anesthesia Start: 1928 Anesthesia Stop:     Procedure: EMERGENT REPAIR OF TYPE A  AORTIC DISSECTION WITH HEMASHIELD GRAFT size 26mm ; INTRAOPERATIVE TRANSESOPHAGEAL ECHOCARDIOGRAM; INSERTION OF TEMPORARY VENTRICULAR PACING WIRE; STERNAL PLATING Diagnosis:       Aortic anomaly (HCC)      (Aortic anomaly (HCC) [Q25.40])    Surgeons: Rafa Gregg MD Anesthesiologist: Yakov Mcgill MD    Anesthesia Type: general ASA Status: 4 - Emergent            Anesthesia Type: general    Vitals Value Taken Time   /88 11/22/24 0135   Temp 97.8 °F (36.6 °C) 11/22/24 0135   Pulse 79 11/22/24 0135   Resp 10 11/22/24 0135   SpO2 97 % 11/22/24 0135       [facility] AN Post Evaluation:   Patient Evaluated in ICU  Patient Participation: complete - patient cannot participate  Level of Consciousness: lethargicYes    Cardiovascular Status: acceptable  Respiratory Status: intubated      Yakov Mcgill MD  11/22/2024 1:39 AM

## 2024-11-22 NOTE — PLAN OF CARE
Keep patient sedated/intubated overnight. MD will assess SBT/extubation in AM. Patient on nitroglycerin gtt, insulin gtt, dobutamine gtt, precedex, propofol. CT level at 35ml, Tanner level at 198ml. Pacemaker wires connected to pacer. VVI.  Webb total output for shift 450ml. CIWA protocol.      Patient needing emergent intubation this afternoon. Per radiology OG needed advancing. OG was advanced 10 cm, from 55cm to 65cm. After another CXR, the recommendations were to advance OG another 15cm. OG could not be heard via auscultation. RN attempted to carefully remove/replace OG. OG was knotted around ETT, requiring emergent re-intubation. RT, CV surgery team, Magui MURPHY and Dr. Rainey at bedside.     Problem: Diabetes/Glucose Control  Goal: Glucose maintained within prescribed range  Description: INTERVENTIONS:  - Monitor Blood Glucose as ordered  - Assess for signs and symptoms of hyperglycemia and hypoglycemia  - Administer ordered medications to maintain glucose within target range  - Assess barriers to adequate nutritional intake and initiate nutrition consult as needed  - Instruct patient on self management of diabetes  Outcome: Progressing     Problem: RESPIRATORY - ADULT  Goal: Achieves optimal ventilation and oxygenation  Description: INTERVENTIONS:  - Assess for changes in respiratory status  - Assess for changes in mentation and behavior  - Position to facilitate oxygenation and minimize respiratory effort  - Oxygen supplementation based on oxygen saturation or ABGs  - Provide Smoking Cessation handout, if applicable  - Encourage broncho-pulmonary hygiene including cough, deep breathe, Incentive Spirometry  - Assess the need for suctioning and perform as needed  - Assess and instruct to report SOB or any respiratory difficulty  - Respiratory Therapy support as indicated  - Manage/alleviate anxiety  - Monitor for signs/symptoms of CO2 retention  Outcome: Not Progressing     Problem: METABOLIC/FLUID AND  ELECTROLYTES - ADULT  Goal: Glucose maintained within prescribed range  Description: INTERVENTIONS:  - Monitor Blood Glucose as ordered  - Assess for signs and symptoms of hyperglycemia and hypoglycemia  - Administer ordered medications to maintain glucose within target range  - Assess barriers to adequate nutritional intake and initiate nutrition consult as needed  - Instruct patient on self management of diabetes  Outcome: Progressing     Problem: HEMATOLOGIC - ADULT  Goal: Maintains hematologic stability  Description: INTERVENTIONS  - Assess for signs and symptoms of bleeding or hemorrhage  - Monitor labs and vital signs for trends  - Administer supportive blood products/factors, fluids and medications as ordered and appropriate  - Administer supportive blood products/factors as ordered and appropriate  Outcome: Progressing     Problem: NEUROLOGICAL - ADULT  Goal: Achieves stable or improved neurological status  Description: INTERVENTIONS  - Assess for and report changes in neurological status  - Initiate measures to prevent increased intracranial pressure  - Maintain blood pressure and fluid volume within ordered parameters to optimize cerebral perfusion and minimize risk of hemorrhage  - Monitor temperature, glucose, and sodium. Initiate appropriate interventions as ordered  Outcome: Not Progressing     Problem: Safety Risk - Non-Violent Restraints  Goal: Patient will remain free from self-harm  Description: INTERVENTIONS:  - Apply the least restrictive restraint to prevent harm  - Notify patient and family of reasons restraints applied  - Assess for any contributing factors to confusion (electrolyte disturbances, delirium, medications)  - Discontinue any unnecessary medical devices as soon as possible  - Assess the patient's physical comfort, circulation, skin condition, hydration, nutrition and elimination needs   - Reorient and redirection as needed  - Assess for the need to continue restraints  Outcome: Not  Progressing

## 2024-11-22 NOTE — ANESTHESIA PREPROCEDURE EVALUATION
Anesthesia PreOp Note    HPI:     Alisha Wilde is a 59 year old female who presents for preoperative consultation requested by: Rafa Gregg MD    Date of Surgery: 2024 - 2024    Procedure(s):  REPAIR OF TYPE A  AORTIC DISSECTION  Indication: Aortic anomaly ([facility]) [Q25.40]    Relevant Problems   No relevant active problems       NPO:                         History Review:  Patient Active Problem List    Diagnosis Date Noted    Positive for microalbuminuria 10/28/2024    Age-related nuclear cataract of both eyes 2024    Myalgia due to statin 2024    Hypokalemia 2023    Polyp of colon 2022    Flat feet, bilateral 2022    Neck mass 2021    Obesity (BMI 30-39.9) 10/01/2020    Controlled type 2 diabetes mellitus without complication, without long-term current use of insulin ([facility]) 2020    Adjustment disorder with mixed anxiety and depressed mood 2020    Vitamin D deficiency 2019    Alcoholism ([facility]) 2018    Essential hypertension 2018    Hypercholesteremia 2016       Past Medical History:    Chronic kidney disease (CKD)    Esophageal reflux    High blood pressure    High cholesterol    HYPERTENSION    Hypertension    Unspecified essential hypertension       Past Surgical History:   Procedure Laterality Date    Colonoscopy N/A 2018    Procedure: COLONOSCOPY;  Surgeon: Magdy Webber MD;  Location: [facility] ENDOSCOPY    Endometrial ablation      child passed away for 5 mins          1    Other surgical history  11    cysto-Dr. Lacey -- pt denies       Prescriptions Prior to Admission[1]  Current Medications and Prescriptions Ordered in Epic[2]    Allergies[3]    Family History   Problem Relation Age of Onset    Hypertension Mother     Heart Disorder Mother 70    Other (Other) Mother         kidney failure    Hypertension Father     Hypertension Maternal Grandfather     Cancer Neg     Diabetes Neg     Glaucoma Neg      Macular degeneration Neg      Social History     Socioeconomic History    Marital status: Single   Tobacco Use    Smoking status: Former     Current packs/day: 0.00     Average packs/day: 1 pack/day for 13.0 years (13.0 ttl pk-yrs)     Types: Cigarettes     Start date:      Quit date:      Years since quittin.9    Smokeless tobacco: Former   Vaping Use    Vaping status: Never Used   Substance and Sexual Activity    Alcohol use: Yes     Alcohol/week: 1.0 - 2.0 standard drink of alcohol     Types: 1 - 2 Cans of beer per week     Comment: every day    Drug use: No       Available pre-op labs reviewed.  Lab Results   Component Value Date    WBC 6.4 2024    RBC 4.58 2024    HGB 12.2 2024    HCT 38.3 2024    MCV 83.6 2024    MCH 26.6 2024    MCHC 31.9 2024    RDW 15.9 (H) 2024    .0 2024     Lab Results   Component Value Date     (H) 2024    K 3.4 (L) 2024     2024    CO2 28.0 2024    BUN 21 2024    CREATSERUM 1.19 (H) 2024     (H) 2024    CA 9.6 2024     Lab Results   Component Value Date    INR 0.99 2024       Vital Signs:  There is no height or weight on file to calculate BMI.   oral temperature is 97.5 °F (36.4 °C). Her blood pressure is 134/84 and her pulse is 87. Her respiration is 24 and oxygen saturation is 95%.   Vitals:    24 1825 24 1830 24 1836 24 1841   BP: 139/84 148/87 143/82 134/84   Pulse: 83 83 77 87   Resp:    Temp:       TempSrc:       SpO2: 95% 96% 97% 95%        Anesthesia Evaluation     Patient summary reviewed and Nursing notes reviewed    Airway   Mallampati: II  TM distance: >3 FB  Neck ROM: full  Dental      Pulmonary - normal exam   Cardiovascular - normal exam  (+) hypertension    ROS comment: Acute Type A Ao dissection    Neuro/Psych - negative ROS     GI/Hepatic/Renal    (+) GERD    Endo/Other    (+) diabetes  mellitus poorly controlled    Comments: Morbid obesity  Abdominal   (+) obese                 Anesthesia Plan:   ASA:  4  Emergent    Plan:   General  Monitors and Lines:   A-line, Additonal IV, BIS, Brain oximetry, Central line, CVP, [place]-Dev and VIJAY  Airway:  ETT and Video laryngoscope  Post-op Pain Management: IV analgesics  Informed Consent Plan and Risks Discussed With:  Patient  Use of Blood Products Discussed With:  Patient  Blood Product Use Consented    Discussed plan with:  Surgeon      I have informed Alisha Wilde and/or legal guardian or family member of the nature of the anesthetic plan, benefits, risks including possible dental damage if relevant, major complications, and any alternative forms of anesthetic management.   All of the patient's questions were answered to the best of my ability. The patient desires the anesthetic management as planned.  Yakov Mcgill MD  11/21/2024 6:42 PM  Present on Admission:  **None**           [1] (Not in a hospital admission)  [2]   Current Facility-Administered Medications Ordered in Epic   Medication Dose Route Frequency Provider Last Rate Last Admin    esmolol (Brevibloc) 2000 mg/100mL infusion premix   mcg/kg/min Intravenous Continuous Nika Echavarria .7 mL/hr at 11/21/24 1835 300 mcg/kg/min at 11/21/24 1835    niCARdipine in sodium chloride 0.86% (carDENE) 20 mg/200mL infusion premix  5-15 mg/hr Intravenous Continuous Nika Echavarria MD 50 mL/hr at 11/21/24 1840 5 mg/hr at 11/21/24 1840     Current Outpatient Medications Ordered in Epic   Medication Sig Dispense Refill    Calcium Carb-Cholecalciferol 600-10 MG-MCG Oral Tab Calcium 600 + D(3) 600 mg-10 mcg (400 unit) tablet, [RxNorm: 651537]      metoprolol succinate ER 50 MG Oral Tablet 24 Hr Take 1 tablet (50 mg total) by mouth daily. 90 tablet 3    Losartan Potassium-HCTZ 100-12.5 MG Oral Tab Take 1 tablet by mouth daily. 90 tablet 3    Potassium Chloride ER 20 MEQ Oral Tab CR Take 1 tablet  by mouth daily. 90 tablet 3    fluticasone propionate 50 MCG/ACT Nasal Suspension 2 sprays by Nasal route daily. 3 each 1    fluticasone-salmeterol 250-50 MCG/ACT Inhalation Aerosol Powder, Breath Activated Inhale 1 puff into the lungs 2 (two) times daily. inhale 1 puff by INHALATION route 2 times every day morning and evening approximately 12 hours apart 1 each 5    albuterol 108 (90 Base) MCG/ACT Inhalation Aero Soln Inhale 2 puffs into the lungs every 4 (four) hours as needed for Wheezing. 3 each 3    amLODIPine 10 MG Oral Tab Take 1 tablet (10 mg total) by mouth daily. 90 tablet 3    VITAMIN D OR Take by mouth.      Ascorbic Acid (VITAMIN C) 1000 MG Oral Tab Vitamin C 1,000 mg tablet, [RxNorm: 509629]      REPATHA SURECLICK 140 MG/ML Subcutaneous Solution Auto-injector       PRALUENT 75 MG/ML Subcutaneous Solution Auto-injector       sodium chloride (SALINE MIST SPRAY) 0.65 % Nasal Solution 1 spray by Nasal route as needed. 60 mL 0    vitamin B-12 50 MCG Oral Tab Take 1 tablet (50 mcg total) by mouth daily.     [3]   Allergies  Allergen Reactions    Atorvastatin MYALGIA    Pravastatin MYALGIA    Rosuvastatin MYALGIA    Seasonal Runny nose

## 2024-11-22 NOTE — CM/SW NOTE
11/22/24 1500   CM/SW Referral Data   Referral Source    Reason for Referral Discharge planning   Informant Sibling   Medical Hx   Does patient have an established PCP? Yes  (Bridger Avendano)   Patient Info   Patient's Current Mental Status at Time of Assessment Confused or unable to complete assessment   Patient's Home Environment Condo/Apt with elevator   Patient lives with Alone   Patient Status Prior to Admission   Independent with ADLs and Mobility Yes   Discharge Needs   Anticipated D/C needs To be determined     Pt discussed during nursing rounds. Sx emergent aortic dissection repair, sedated on ventilator. Home alone, independent and working 2 jobs prior to admission per brother. PT/OT evals will be needed for dc recommendation once appropriate.    Plan: TBD    / to remain available for support and/or discharge planning.     VAISHALI Vasquez    [phone]

## 2024-11-22 NOTE — CONSULTS
Initial Pulmonary/ICU/Critical Care Consultation        Reason for Consultation: post op care  Referring Physician: Dr. Gregg      HPI:   60 yo woman with hx of HTN, CKD, HLP, previous smokier admitted overnight with chest and back pain. Found to have an acute type A aortic dissection on CT imaging. Went for emergent surgical repair with CV surgery. Now seen post op intubated with drains.   Family and friend at bedside. Seen with CV NP this am.   Pt unable to provide hx and family unable to give more info than obtained already.       REVIEW OF SYSTEMS:  Positives and negatives as noted in HPI. All other review of systems otherwise are either limited (due to pt/family inability to provide) or negative.      PAST MEDICAL HISTORY:  Past Medical History:   Diagnosis Date    Chronic kidney disease (CKD)     Esophageal reflux     High blood pressure     High cholesterol     HYPERTENSION     Hypertension     Unspecified essential hypertension        PAST SURGICAL HISTORY:  Past Surgical History:   Procedure Laterality Date    Colonoscopy N/A 2018    Procedure: COLONOSCOPY;  Surgeon: Magdy Webber MD;  Location: [facility] ENDOSCOPY    Endometrial ablation      child passed away for 5 mins          1    Other surgical history  11    cysto-Dr. Lacey -- pt denies         PAST SOCIAL HISTORY:  Social History     Socioeconomic History    Marital status: Single   Tobacco Use    Smoking status: Former     Current packs/day: 0.00     Average packs/day: 1 pack/day for 13.0 years (13.0 ttl pk-yrs)     Types: Cigarettes     Start date:      Quit date:      Years since quittin.9    Smokeless tobacco: Former   Vaping Use    Vaping status: Never Used   Substance and Sexual Activity    Alcohol use: Yes     Alcohol/week: 1.0 - 2.0 standard drink of alcohol     Types: 1 - 2 Cans of beer per week     Comment: every day    Drug use: No         PAST FAMILY HISTORY:  Family History   Problem Relation Age of  Onset    Hypertension Mother     Heart Disorder Mother 70    Other (Other) Mother         kidney failure    Hypertension Father     Hypertension Maternal Grandfather     Cancer Neg     Diabetes Neg     Glaucoma Neg     Macular degeneration Neg          ALLERGIES:  Allergies[1]      MEDS:  Home Medications:  Medications Taking[2]    Scheduled Medication:   acetaminophen  1,000 mg Intravenous Q8H    sennosides  8.6 mg Oral BID    docusate sodium  100 mg Oral BID    metoclopramide  10 mg Intravenous Q6H    famotidine  20 mg Oral BID    Or    famotidine  20 mg Intravenous BID    metoprolol tartrate  25 mg Oral 2x Daily(Beta Blocker)    mupirocin  1 Application Nasal BID    chlorhexidine gluconate  15 mL Mouth/Throat BID    [START ON 11/23/2024] multivitamin  1 tablet Oral Daily    [START ON 11/23/2024] ascorbic acid  500 mg Oral TID    ceFAZolin  2 g Intravenous Q8H     Continuous Infusing Medication:   sodium chloride      dexmedetomidine 1.5 mcg/kg/hr (11/22/24 0717)    DOBUTamine 1 mcg/kg/min (11/22/24 0600)    nitroGLYCERIN in dextrose 5% 60 mcg/min (11/22/24 0600)    norepinephrine      clevidipine 5 mg/hr (11/22/24 0616)    dextrose 5%-sodium chloride 0.45% 40 mL/hr (11/22/24 0600)    propofol 25 mcg/kg/min (11/22/24 0716)    esmolol 300 mcg/kg/min (11/21/24 1914)    niCARdipine 15 mg/hr (11/21/24 1905)    norepinephrine      insulin regular 9 Units/hr (11/22/24 0800)     PRN Medications:    melatonin    polyethylene glycol (PEG 3350)    bisacodyl    ondansetron    DOBUTamine    nitroGLYCERIN in dextrose 5%    norepinephrine    potassium chloride **OR** potassium chloride    magnesium sulfate in dextrose 5%    magnesium sulfate in sterile water for injection    morphINE **OR** morphINE    albumin human       PHYSICAL EXAM:  /61 (BP Location: Right arm)   Pulse 92   Temp 99 °F (37.2 °C) (Pulmonary Artery)   Resp 18   Wt 258 lb 2.5 oz (117.1 kg)   SpO2 95%   BMI 42.96 kg/m²   Vent Mode: SIMV/VC  FiO2  (%):  [50 %-60 %] 60 %  S RR:  [10-14] 10  S VT:  [450 mL-800 mL] 600 mL  PEEP/CPAP (cm H2O):  [5 cm H20-7 cm H20] 7 cm H20  MAP (cm H2O):  [9-11] 11    CONSTITUTIONAL: intubated, sedated  HEENT: atraumatic normocephalic  MOUTH: ETT, OGT  NECK/THROAT: no JVD. Trachea midline. No obvious thyromegaly. +right swan  LUNG: vented upper clear BS b/l no wheezing, crackles. Diminished at bases. Chest symmetric with respiratory motion. + midsternal surgical wound. + chest tube  HEART: regular rate and rhythm, no obvious murmers or gallops noted  ABD: soft non tender. + bowel sounds. No organomegaly noted  EXT: no clubbing, cyanosis, or edema noted. Pulses intact grossly  NEURO/MUSCULOSKELETAL: intubated sedated  SKIN: warm, dry. No obvious lesions noted  LYMPH: no obvious LAD      IMAGES:   CXR 11/22/24  CONCLUSION: Post feeding tube placement with tip in the distal esophagus approximately 4 cm above the gastroesophageal junction.  Recommend advancement of the tube by approximately 10 cm to place it in the stomach.     CXR 11/22/24  CARDIAC/MEDIASTINUM: The cardiac silhouette is enlarged and unchanged.. There is a right internal jugular [place]-Dev catheter with tip at the level of the main pulmonary artery. There is a right-sided chest tube. Endotracheal tube with tip approximately    5.3 cm above the jennyfer. There is a nasogastric tube with tip and sidehole within the stomach and below the diaphragm. There are median sternotomy wires and postoperative changes of ascending aortic repair.   LUNGS: There is pulmonary vascular redistribution without edema. Minimal blunting of the bilateral costophrenic angles may be secondary to trace pleural effusions versus chronic pleural thickening. There is mild bibasilar atelectasis. No pneumothorax.   BONES: There is degenerative disease of the thoracic spine.     Chest CTA 11/22/24  CONCLUSION:   1. Type a aortic dissection beginning just above right renal (correction:  Right coronary)   artery and extending distally into the left common iliac artery and external iliac.  Findings were called to Dr. Echavarria at 5:52 p.m.       LABS:  Recent Labs   Lab 11/21/24  1720 11/22/24  0133   RBC 4.58 3.38*   HGB 12.2 9.3*   HCT 38.3 28.0*   MCV 83.6 82.8   MCH 26.6 27.5   MCHC 31.9 33.2   RDW 15.9* 15.9*   NEPRELIM 3.70  --    WBC 6.4 14.2*   .0 201.0       Recent Labs   Lab 11/21/24  1720 11/22/24  0251   * 194*   BUN 21 20   CREATSERUM 1.19* 1.25*   EGFRCR 52* 49*   CA 9.6 8.7   ALB 4.3  --    * 143   K 3.4* 4.0    111   CO2 28.0 22.0   ALKPHO 90  --    AST 27  --    ALT 22  --    BILT 0.4  --    TP 6.9  --        ASSESSMENT/PLAN:  Type A aortic dissection s/p repair now intubated in ICU  -on gtts (ntg, cleviprex, dobutamine)  -chest tube management as per CV    Post op acute respiratory failure  -no plans for extubation this am  -switch SIMV to AC vent settings  -repeat ABG  -PRN CXR  -continue propofol, precedex, PRN fentanyl    Previous hx of smoking and ETOH  -CIWA once extubated or close to being ready  Start duonebs PRN    NAIMA on CKD  -likely from surgery  -monitor UO and renal labs    Proph:  -DVT: SCDs    Dispo:  -full code  -discussed with family at bedside.     Critical care time 35 min independent of procedures      Thank you for the opportunity to care for Alisha Rainey DO, MPH  Pulmonary Critical Care Medicine  [facility]                         [1]   Allergies  Allergen Reactions    Atorvastatin MYALGIA    Pravastatin MYALGIA    Rosuvastatin MYALGIA    Seasonal Runny nose   [2]   Outpatient Medications Marked as Taking for the 11/21/24 encounter (Hospital Encounter)   Medication Sig Dispense Refill    Acetaminophen ER (TYLENOL 8 HOUR) 650 MG Oral Tab CR Take 2 tablets (1,300 mg total) by mouth daily as needed.      Calcium Carb-Cholecalciferol 600-10 MG-MCG Oral Tab Take 1 tablet by mouth daily.       metoprolol succinate ER 50 MG Oral Tablet 24 Hr Take 1 tablet (50 mg total) by mouth daily. 90 tablet 3    Losartan Potassium-HCTZ 100-12.5 MG Oral Tab Take 1 tablet by mouth daily. 90 tablet 3    Potassium Chloride ER 20 MEQ Oral Tab CR Take 1 tablet by mouth daily. 90 tablet 3    albuterol 108 (90 Base) MCG/ACT Inhalation Aero Soln Inhale 2 puffs into the lungs every 4 (four) hours as needed for Wheezing. 3 each 3    amLODIPine 10 MG Oral Tab Take 1 tablet (10 mg total) by mouth daily. 90 tablet 3    Ascorbic Acid (VITAMIN C) 1000 MG Oral Tab Take 1 tablet (1,000 mg total) by mouth daily.      vitamin B-12 50 MCG Oral Tab Take 1 tablet (50 mcg total) by mouth daily.

## 2024-11-22 NOTE — H&P
[facility]    PATIENT'S NAME: JEFF PETERSON   ATTENDING PHYSICIAN: Rafa Gregg MD   PATIENT ACCOUNT#:   [id number]    LOCATION:  [facility] OR [place] [address]  MEDICAL RECORD #:   [id number]       YOB: 1965  ADMISSION DATE:       11/21/2024    HISTORY AND PHYSICAL EXAMINATION    CHIEF COMPLAINT:  Type A aortic dissection.    HISTORY OF PRESENT ILLNESS:  The patient is a 59-year-old  female who came into the emergency department for evaluation of intense anterior upper chest/neck pain started around 2 to 3 hours earlier.  Today, CBC and chemistry initially with troponin were negative.  GFR is 52 which is at baseline.  Liver function tests were unremarkable.  She was noted to have heart block on EKG with ST and T segment changes.  She was given atropine, and her heart rate improved to mid 80s.  Chest x-ray showed cardiac enlargement with secondary signs of slight early fluid overload.  CT angiogram of the chest, abdomen, and pelvis rule out dissection showed type A aortic dissection beginning just above the right coronary artery and extending distally into the left common iliac and external iliac resulting in significant narrowing of the left external iliac at the edge of the field view.  False lumen is low right lateral along the ascending aorta and left lateral along the descending aorta.  The innominate left common carotid and subclavian all off the true lumen and are patent.  Celiac, superior mesenteric artery, right renal artery are patent and arise from the true lumen.  The left renal artery saddles the true and false lumen, and the dissection extends into the origin of the left renal artery and resulting in approximately 50% narrowing of its origin.  Beyond the origin, left renal artery is patent.  The inferior mesenteric artery from the false lumen, in the upper abdomen, the false lumen occupies 50% to 60% of the aortic diameter.  In the juxtarenal region and in  the infrarenal region, the false lumen flow is limited.  Patient started on esmolol drip.  Cardiovascular surgery consult was obtained.  Patient will be taken to the OR shortly.    PAST MEDICAL HISTORY:  Morbid obesity, hypertension, gastroesophageal reflux disease, chronic kidney disease stage 2 to 3, and hyperlipidemia.    PAST SURGICAL HISTORY:  Endometrial ablation and cystoscopy.    MEDICATIONS:  Please see medication reconciliation list.     ALLERGIES:  She had side effects to statin.  No known drug allergies.    FAMILY HISTORY:  Positive for hypertension.  Mother had heart disease.    SOCIAL HISTORY:  Ex-tobacco user.  Social alcohol.  No drug use.  Lives with her family.  Independent for basic activities of daily living.     REVIEW OF SYSTEMS:  Patient reports midsternal and anterior neck pain started 2 to 3 hours ago, now radiating to her upper back.  No abdominal pain.  She feels short of breath and she feels very apprehensive.  Symptoms started suddenly.  No recent trauma.  Other 12-point review of systems is negative.       PHYSICAL EXAMINATION:    GENERAL:  Alert and oriented to time, place, and person.  Moderate distress.  VITAL SIGNS:  Temperature 97.5, pulse 77, respiratory rate 16, blood pressure 143/82, pulse ox 97% on room air.    HEENT:  Atraumatic.  Oropharynx clear.  Dry mucous membranes.  Ears, nose normal.  Eyes:  Anicteric sclerae.   NECK:  Supple.  No lymphadenopathy.  Trachea midline.   LUNGS:  Clear to auscultation bilaterally.  Normal respiratory effort.   HEART:  Regular rate, rhythm.  S1 and S2 auscultated.   ABDOMEN:  Soft, nondistended.  Obese.  Positive bowel sounds.    EXTREMITIES:  Edema +1 both legs.  No clubbing or cyanosis.  NEUROLOGIC:  Motor and sensory intact.    ASSESSMENT:    1.   Acute type A aortic dissection extending from the right coronary artery toward the left common and external iliac arteries as outlined above.  2.   Essential hypertension.  3.   Morbid  obesity.  4.   Initial third-degree heart block, improved with IV atropine.    PLAN:  Patient will be taken to the OR by Cardiovascular Surgery, after that intensive care unit.  Obtain cardiology and critical care consult.  Esmolol drip was initiated in the emergency room plus pain medications.  Further recommendations to follow.    Dictated By Sammi Kinney MD  d: 11/21/2024 18:40:39  t: 11/21/2024 21:07:17  Job [id number]  FB/    cc: Rafa Gregg MD

## 2024-11-22 NOTE — PAYOR COMM NOTE
--------------  ADMISSION REVIEW   11/21-11/22  Payor: [employer] CHOICE/HMO/POS/EPO  Subscriber #:  [id number]  Authorization Number: [id number]    Admit date: 11/22/24  Admit time:  1:28 AM       REVIEW DOCUMENTATION:  ED Provider Notes signed by Nika Echavarria MD at 11/21/2024  8:21 PM    Patient Seen in: [facility] Emergency Department      History     Chief Complaint   Patient presents with    Chest Pain Angina     Stated Complaint: chest pain    Subjective:   HPI  59-year-old female with CKD, hypertension, high cholesterol, who presents for evaluation of chest pain and epigastric pain.  She describes a sharp pain which began about 30 minutes prior to calling the ambulance while she was sitting down.  She has associated dyspnea.  No blood thinners.  Objective:     Past Medical History:    Chronic kidney disease (CKD)    Esophageal reflux    High blood pressure    High cholesterol    HYPERTENSION    Hypertension    Unspecified essential hypertension     Physical Exam     ED Triage Vitals   BP 11/21/24 1717 93/48   Pulse 11/21/24 1717 64   Resp 11/21/24 1717 18   Temp 11/21/24 1743 97.5 °F (36.4 °C)   Temp src 11/21/24 1743 Oral   SpO2 11/21/24 1717 99 %   O2 Device 11/21/24 1717 None (Room air)       Current Vitals:   Vital Signs  BP: 95/57  Pulse: 76  Resp: 25  Temp: 97.5 °F (36.4 °C)  Temp src: Oral  MAP (mmHg): 68    Oxygen Therapy  SpO2: 93 %  O2 Device: None (Room air)        Physical Exam  Vitals and nursing note reviewed.   Constitutional:       Appearance: She is well-developed.   HENT:      Head: Normocephalic and atraumatic.   Eyes:      Extraocular Movements: Extraocular movements intact.   Cardiovascular:      Rate and Rhythm: Normal rate and regular rhythm.      Heart sounds: Normal heart sounds.   Pulmonary:      Effort: Pulmonary effort is normal.      Breath sounds: Normal breath sounds.   Abdominal:      General: There is no distension.      Palpations: Abdomen is soft.       Tenderness: There is no abdominal tenderness.   Musculoskeletal:         General: Normal range of motion.      Cervical back: Normal range of motion.      Right lower leg: No edema.      Left lower leg: No edema.   Skin:     General: Skin is warm.   Neurological:      Mental Status: She is alert.      Comments: No focal deficits       Differential diagnose includes but is not limited to aortic dissection, PE, ACS/MI    ED Course     Labs Reviewed   CBC WITH DIFFERENTIAL WITH PLATELET - Abnormal; Notable for the following components:       Result Value    RDW-SD 48.6 (*)     RDW 15.9 (*)     All other components within normal limits   BASIC METABOLIC PANEL (8) - Abnormal; Notable for the following components:    Glucose 109 (*)     Sodium 146 (*)     Potassium 3.4 (*)     Creatinine 1.19 (*)     Calculated Osmolality 306 (*)     eGFR-Cr 52 (*)     All other components within normal limits   PRO BETA NATRIURETIC PEPTIDE - Abnormal; Notable for the following components:    Pro-Beta Natriuretic Peptide 134 (*)     All other components within normal limits   TROPONIN I HIGH SENSITIVITY - Normal   HEPATIC FUNCTION PANEL (7) - Normal   PTT, ACTIVATED - Normal   PROTHROMBIN TIME (PT) - Normal   RAPID SARS-COV-2 BY PCR - Normal   TYPE AND SCREEN    Narrative:     The following orders were created for panel order Type and screen.  Procedure                               Abnormality         Status                     ---------                               -----------         ------                     ABORH (Blood Type)[[id number]]                               Final result               Antibody Screen[[id number]]                                  Final result                 Please view results for these tests on the individual orders.   ABORH (BLOOD TYPE)   ANTIBODY SCREEN   ABORH CONFIRMATION   PREPARE PLATELETS   PREPARE FRESH FROZEN PLASMA   PREPARE RBC   RAINBOW DRAW LAVENDER   RAINBOW DRAW LIGHT GREEN   RAINBOW DRAW BLUE      EKG at 1718    Rate, intervals and axes as noted on EKG Report.  Rate: 51  Rhythm: Complete heart block  Reading: No STEMI    EKG at 1735    Rate, intervals and axes as noted on EKG Report.  Rate: 49  Rhythm: Complete heart block  Reading: No STEMI       ED Course as of 11/21/24 2021  ------------------------------------------------------------  Time: 11/21 1744  Comment: Improvement in heart rate after 1 mg IV atropine  ------------------------------------------------------------  Time: 11/21 1814  Comment: D/w Dr Rafa Gregg- states he is not on call, and he will attempt to reach on call physician Dr Leone  ------------------------------------------------------------  Time: 11/21 1824  Comment: D/w DR Gregg- will come in emergently and plan for operative repair tonight  ------------------------------------------------------------  Time: 11/21 1846  Comment: Dr Leone returning my page, will meet Dr Gregg in the OR       XR CHEST AP PORTABLE  (CPT=71045)    Result Date: 11/21/2024  CONCLUSION: Cardiac enlargement with secondary signs of slight/early fluid overload.    Dictated by (CST): Bo Ramachandran MD on 11/21/2024 at 6:25 PM     Finalized by (CST): Bo Ramachandran MD on 11/21/2024 at 6:25 PM          CTA CHEST+CTA ABDOMEN DISSECT SET (CPT=71275/94414)    Result Date: 11/21/2024  CONCLUSION:  1. Type a aortic dissection beginning just above right renal artery and extending distally into the left common iliac artery and external iliac.  Findings were called to Dr. Echavarria at 5:52 p.m.    Dictated by (CST): Balaji Alan MD on 11/21/2024 at 5:45 PM     Finalized by (CST): Balaji Alan MD on 11/21/2024 at 5:56 PM             I spent a total of 50 minutes of critical care time in obtaining history, performing a physical exam, bedside monitoring of interventions, collecting and interpreting tests and discussion with consultants but not including time spent performing procedures.    Medical Decision  Making  Upon arrival to the ED, patient is bradycardic and hypotensive.  EKG is concerning for complete heart block.  His vitals improved after 1 mg IV atropine.  I ordered stat CTA chest abdomen pelvis dissection protocol.    Labs including CBC, coags, troponin, chemistry unremarkable.    Per my independent interpretation of CTA chest there is concern for type A dissection.  I spoke with radiologist Dr. Alan regarding this result.  Attempted paging on-call cardiothoracic surgery Dr. Leone without call back after 30 minutes and reached out to Dr. Gregg who evaluated patient at bedside and plan for operative repair urgently.    Blood pressure and heart rate controlled with esmolol drip followed by nicardipine drip.    Amount and/or Complexity of Data Reviewed  Labs: ordered. Decision-making details documented in ED Course.  Radiology: ordered and independent interpretation performed. Decision-making details documented in ED Course.  ECG/medicine tests: ordered and independent interpretation performed. Decision-making details documented in ED Course.  Discussion of management or test interpretation with external provider(s): As above  Notified [facility] hospitalist for admission; [facility], and crit care for consults    Risk  Drug therapy requiring intensive monitoring for toxicity.  Decision regarding hospitalization.  Emergency major surgery.    Disposition and Plan     Clinical Impression:  1. Dissection of ascending aorta (HCC)         Disposition:  Send to or/cath/gi  11/21/2024  6:25 pm          11/21 H&P    CHIEF COMPLAINT:  Type A aortic dissection.     HISTORY OF PRESENT ILLNESS:  The patient is a 59-year-old  female who came into the emergency department for evaluation of intense anterior upper chest/neck pain started around 2 to 3 hours earlier.  Today, CBC and chemistry initially with troponin were negative.  GFR is 52 which is at baseline.  Liver function tests were unremarkable.  She was noted to  have heart block on EKG with ST and T segment changes.  She was given atropine, and her heart rate improved to mid 80s.  Chest x-ray showed cardiac enlargement with secondary signs of slight early fluid overload.  CT angiogram of the chest, abdomen, and pelvis rule out dissection showed type A aortic dissection beginning just above the right coronary artery and extending distally into the left common iliac and external iliac resulting in significant narrowing of the left external iliac at the edge of the field view.  False lumen is low right lateral along the ascending aorta and left lateral along the descending aorta.  The innominate left common carotid and subclavian all off the true lumen and are patent.  Celiac, superior mesenteric artery, right renal artery are patent and arise from the true lumen.  The left renal artery saddles the true and false lumen, and the dissection extends into the origin of the left renal artery and resulting in approximately 50% narrowing of its origin.  Beyond the origin, left renal artery is patent.  The inferior mesenteric artery from the false lumen, in the upper abdomen, the false lumen occupies 50% to 60% of the aortic diameter.  In the juxtarenal region and in the infrarenal region, the false lumen flow is limited.  Patient started on esmolol drip.  Cardiovascular surgery consult was obtained.  Patient will be taken to the OR shortly.     PAST MEDICAL HISTORY:  Morbid obesity, hypertension, gastroesophageal reflux disease, chronic kidney disease stage 2 to 3, and hyperlipidemia.      REVIEW OF SYSTEMS:  Patient reports midsternal and anterior neck pain started 2 to 3 hours ago, now radiating to her upper back.  No abdominal pain.  She feels short of breath and she feels very apprehensive.  Symptoms started suddenly.  No recent trauma.  Other 12-point review of systems is negative.        PHYSICAL EXAMINATION:    GENERAL:  Alert and oriented to time, place, and person.  Moderate  distress.  VITAL SIGNS:  Temperature 97.5, pulse 77, respiratory rate 16, blood pressure 143/82, pulse ox 97% on room air.    HEENT:  Atraumatic.  Oropharynx clear.  Dry mucous membranes.  Ears, nose normal.  Eyes:  Anicteric sclerae.   NECK:  Supple.  No lymphadenopathy.  Trachea midline.   LUNGS:  Clear to auscultation bilaterally.  Normal respiratory effort.   HEART:  Regular rate, rhythm.  S1 and S2 auscultated.   ABDOMEN:  Soft, nondistended.  Obese.  Positive bowel sounds.    EXTREMITIES:  Edema +1 both legs.  No clubbing or cyanosis.  NEUROLOGIC:  Motor and sensory intact.     ASSESSMENT:    1.       Acute type A aortic dissection extending from the right coronary artery toward the left common and external iliac arteries as outlined above.  2.       Essential hypertension.  3.       Morbid obesity.  4.       Initial third-degree heart block, improved with IV atropine.     PLAN:  Patient will be taken to the OR by Cardiovascular Surgery, after that intensive care unit.  Obtain cardiology and critical care consult.  Esmolol drip was initiated in the emergency room plus pain medications.  Further recommendations to follow.        11/21 Surgery    Reason for Consultation:  Aortic dissection     History of Present Illness:  Alisha Wilde is a a(n) 59 year old female. Presented to ED via EMS with 30 minute history of chest and back pain.  CT demonstrates acute type a aortic dissection, we are consulted for evaluation.  Patient complains of chest pain and back pain.  Denies any abdominal pain or pain in her extremities.  No history of aneurysm as far as the patient knows.        esmolol (Brevibloc) 2000 mg/100mL infusion premix,  mcg/kg/min, Intravenous, Continuous    niCARdipine in sodium chloride 0.86% (carDENE) 20 mg/200mL infusion premix, 5-15 mg/hr, Intravenous, Continuous       59-year-old female with acute type a aortic dissection.     Proceed emergently to the operating room for repair of acute type a  aortic dissection.  I explained 50% risk of mortality given acute presentation.  I explained risks of the operation: Limited to: Bleeding, transfusion, coagulopathy, wound infection, delayed union, dehiscence, stroke, encephalopathy, alcohol withdrawal, myocardial infarction, prolonged ventilation, tracheostomy, visceral malperfusion, extremity malperfusion, loss of limb, arrhythmia, atrial fibrillation, kidney insufficiency, kidney failure and dialysis, organ dysfunction, organ failure, imponderables and death.  Patient proceeding urgently to the operating room once staff is available.  Her brother was updated of the above as well.  Via phone.          Procedure Summary         Date: 24 Room / Location: [facility] MAIN OR  / [facility] MAIN OR     Anesthesia Start:  Anesthesia Stop:      Procedure: EMERGENT REPAIR OF TYPE A  AORTIC DISSECTION WITH HEMASHIELD GRAFT size 26mm ; INTRAOPERATIVE TRANSESOPHAGEAL ECHOCARDIOGRAM; INSERTION OF TEMPORARY VENTRICULAR PACING WIRE; STERNAL PLATING Diagnosis:       Aortic anomaly (HCC)      (Aortic anomaly (HCC) [Q25.40])     Surgeons: Rafa Gregg MD Anesthesiologist: Yakov Mcgill MD     Anesthesia Type: general ASA Status: 4 - Emergent                CV Surgery    Subjective:  Intubated/sedated.  Hemodynamically stable on Cleviprex/nitro 60.  Sister/Brother currently visiting at bedside.      Objective:  BP 94/57 (BP Location: Right arm)   Pulse 93   Temp 99 °F (37.2 °C) (Pulmonary Artery)   Resp 15   Ht 5' 5\" (1.651 m)   Wt 258 lb 2.5 oz (117.1 kg)   SpO2 94%   BMI 42.96 kg/m²      Temp (24hrs), Av.3 °F (36.3 °C), Min:95.3 °F (35.2 °C), Max:99 °F (37.2 °C)  Telemetry-NSR with PVC     Intake/Output:     Intake/Output Summary (Last 24 hours) at 2024 1000  Last data filed at 2024 0900      Gross per 24 hour   Intake 5441.8 ml   Output 2390 ml   Net 3051.8 ml             Wt Readings from Last 3 Encounters:   24 258 lb 2.5 oz (117.1 kg)    10/24/24 254 lb (115.2 kg)   05/17/24 249 lb (112.9 kg)         Allergies:  [Allergies]    [Allergies]       Allergen Reactions    Atorvastatin MYALGIA    Pravastatin MYALGIA    Rosuvastatin MYALGIA    Seasonal Runny nose        Labs:        Lab Results   Component Value Date     WBC 14.2 11/22/2024     HGB 9.3 11/22/2024     HCT 28.0 11/22/2024     .0 11/22/2024     CREATSERUM 1.25 11/22/2024     BUN 20 11/22/2024      11/22/2024     K 4.0 11/22/2024      11/22/2024     CO2 22.0 11/22/2024      11/22/2024     CA 8.7 11/22/2024     ALB 4.3 11/21/2024     Physical Exam:  Blood pressure 94/57, pulse 93, temperature 99 °F (37.2 °C), temperature source Pulmonary Artery, resp. rate 15, height 5' 5\" (1.651 m), weight 258 lb 2.5 oz (117.1 kg), SpO2 94%, not currently breastfeeding.  General: Intubated/sedated  Neck: Unable to assess due to body habitus/intubation/RIJ cordis/[place]  Lungs: Slightly coarse anterior/diminished lateral  CT - 100cc/6hrs, no air leak noted  Heart: RRR, S1, S2  Abdomen: Soft, NT/ND, BS hypoactive  Extremities: Warm, dry, trace generalized edema  Pulses: 2+ bilat DP  Skin: sternotomy stable, Prevena dressing intact  Neurological: Sedated/intubated, positive gag with suctioning/spontaneously moves all extremities with sedation lightened     Assessment/Plan:  S/p emergent aortic dissection repair-POD 0  Hemodynamically stable on Cleviprex/nitro-wean as tolerated to maintain SBP less than 120, MAP 65-70.  EKG reviewed  Leave intubated on full vent support today, may wean vent per pulmonary critical care tomorrow if remains hemodynamically stable to extubate.  CXR reviewed.  Repeat CXR in a.m.  Placed OGT-suction LIS, okay to give meds  Pain meds as needed, fentanyl IV/MSO4 as needed  DVT prevention - SCDs/Lovenox  Expected postop anemia - mild/stable, consider starting Iron if Hgb <10, repeat CBC this a.m.  Expected postop volume overload - monitor daily weights, I/Os, lasix  prn, repeat BMP pending  Insulin coverage per protocol -no history DM preop, anticipate transitioning off insulin drip once extubated.  EtOH use per family-[facility] protocol      MEDICATIONS ADMINISTERED IN LAST 1 DAY:  Transfuse platelets       Date Action Dose Route User    11/21/2024 2346 New Bag (none) (none) Yakov Mcgill MD          Transfuse platelets       Date Action Dose Route User    11/21/2024 2359 New Bag (none) (none) Yakov Mcgill MD          Transfuse cryoprecipitate       Date Action Dose Route User    11/22/2024 0008 New Bag (none) (none) Yakov Mcgill MD          Transfuse cryoprecipitate       Date Action Dose Route User    11/22/2024 0012 New Bag (none) (none) Yakov Mcgill MD          acetaminophen (Ofirmev) 10 mg/mL infusion premix 1,000 mg       Date Action Dose Route User    11/22/2024 0948 New Bag 1,000 mg Intravenous Bridget Berman RN    11/22/2024 0244 New Bag 1,000 mg Intravenous Yakov Ugarte RN          amiodarone (Cordarone) 150 mg in dextrose 5% 100 mL IV bolus       Date Action Dose Route User    11/21/2024 2255 New Bag 150 mg Intravenous Yakov Mcgill MD          atropine 0.1 MG/ML injection 1 mg       Date Action Dose Route User    11/21/2024 1740 Given 1 mg Intravenous Brooklynn Soriano RN          calcium gluconate injection       Date Action Dose Route User    11/21/2024 2329 Given 1 g Intravenous Yakov Mcgill MD          ceFAZolin (Ancef) injection       Date Action Dose Route User    11/21/2024 2104 Given 1 g Extracorporeal Kieran Truong CCP          ceFAZolin (Ancef) 2g in 10mL IV syringe premix       Date Action Dose Route User    11/22/2024 0104 Given 2 g Intravenous Yakov Mcgill MD    11/21/2024 1957 Given 2 g Intravenous Yakov Mcgill MD          ceFAZolin (Ancef) 2g in 10mL IV syringe premix       Date Action Dose Route User    11/22/2024 1156 New Bag 2 g Intravenous Bridget Berman RN    11/22/2024  0326 New Bag 2 g Intravenous Yakov Ugarte RN          chlorhexidine gluconate (Peridex) 0.12 % oral solution 15 mL       Date Action Dose Route User    11/22/2024 0926 Given 15 mL Mouth/Throat Bridget Berman, CYNDIE          clevidipine (Cleviprex) 25 MG/50ML IV infusion       Date Action Dose Route User    11/22/2024 1129 Rate/Dose Change 1 mg/hr Intravenous Bridget Berman, CYNDIE    11/22/2024 1100 Restarted 1 mg/hr Intravenous Bridget Berman, CYNDIE    11/22/2024 1031 Rate/Dose Change 2 mg/hr Intravenous Bridget Berman, CYNDIE    11/22/2024 0938 Rate/Dose Change 3 mg/hr Intravenous Bridget Berman, CYNDIE    11/22/2024 0800 Rate/Dose Change 4 mg/hr Intravenous Bridget Berman, CYNDIE    11/22/2024 0616 New Bag 5 mg/hr Intravenous Yakov Ugarte, RN    11/22/2024 0600 Rate/Dose Verify 5 mg/hr Intravenous Yakov Ugarte, RN    11/22/2024 0400 Rate/Dose Change 5 mg/hr Intravenous Yakov Ugarte, RN    11/22/2024 0325 Rate/Dose Change 4 mg/hr Intravenous Yakov Ugarte, RN    11/22/2024 0320 Rate/Dose Change 3 mg/hr Intravenous Yakov Ugarte, RN    11/22/2024 0315 Rate/Dose Change 2 mg/hr Intravenous Yakov Ugarte, RN    11/22/2024 0300 Restarted 1 mg/hr Intravenous Yakov Ugarte, RN    11/22/2024 0227 New Bag 1 mg/hr Intravenous Yakov Ugarte RN          dexmedeTOMIDine in sodium chloride 0.9% (Precedex) 400 mcg/100mL infusion premix       Date Action Dose Route User    11/21/2024 1940 New Bag 0.6 mcg/kg/hr × 115.2 kg Intravenous Yakov Mcgill MD          dexmedeTOMIDine in sodium chloride 0.9% (Precedex) 400 mcg/100mL infusion premix       Date Action Dose Route User    11/22/2024 0951 New Bag 1.5 mcg/kg/hr × 117.1 kg (Dosing Weight) Intravenous Bridget Berman RN    11/22/2024 0717 New Bag 1.5 mcg/kg/hr × 117.1 kg (Dosing Weight) Intravenous Yakov Ugarte RN    11/22/2024 0715 New Bag 1.5 mcg/kg/hr × 117.1 kg (Dosing  Weight) Intravenous Yakov Ugarte RN    11/22/2024 0600 Rate/Dose Verify 1.5 mcg/kg/hr × 117.1 kg (Dosing Weight) Intravenous Yakov Ugarte RN    11/22/2024 0533 New Bag 1.5 mcg/kg/hr × 117.1 kg (Dosing Weight) Intravenous Yakov Ugarte RN    11/22/2024 0400 Rate/Dose Verify 1.5 mcg/kg/hr × 117.1 kg (Dosing Weight) Intravenous Yakov Ugarte RN    11/22/2024 0300 Rate/Dose Change 1.5 mcg/kg/hr × 117.1 kg (Dosing Weight) Intravenous Yakov Ugarte RN    11/22/2024 0234 New Bag 1 mcg/kg/hr × 117.1 kg (Dosing Weight) Intravenous Yakov Ugarte RN    11/22/2024 0145 Rate/Dose Change 0.7 mcg/kg/hr × 117.1 kg (Dosing Weight) Intravenous Yakov Ugarte RN          dextrose 5%-sodium chloride 0.45% infusion       Date Action Dose Route User    11/22/2024 0600 Rate/Dose Verify 40 mL/hr Intravenous Yakov Ugarte RN    11/22/2024 0145 New Bag 40 mL/hr Intravenous Yakov Ugarte RN          DOBUTamine in dextrose 5% (Dobutrex) 500 mg/250mL infusion premix       Date Action Dose Route User    11/21/2024 2232 New Bag 2 mcg/kg/min × 115.2 kg Intravenous Yakov Mcgill MD          DOBUTamine in dextrose 5% (Dobutrex) 500 mg/250mL infusion premix       Date Action Dose Route User    11/22/2024 0600 Rate/Dose Verify 1 mcg/kg/min × 117.1 kg (Dosing Weight) Intravenous Yakov Ugarte RN    11/22/2024 0400 Rate/Dose Verify 1 mcg/kg/min × 117.1 kg (Dosing Weight) Intravenous Yakov Ugarte RN    11/22/2024 0330 Rate/Dose Change 1 mcg/kg/min × 117.1 kg (Dosing Weight) Intravenous Yakov Ugarte RN    11/22/2024 0200 Restarted 2 mcg/kg/min × 117.1 kg (Dosing Weight) Intravenous Yakov Ugarte, CYNDIE          EPHEDrine 50 MG/ML injection       Date Action Dose Route User    11/21/2024 1946 Given 10 mg Intravenous Yakov Mcgill MD          esmolol (Brevibloc) 2000 mg/100mL infusion premix        Date Action Dose Route User    11/21/2024 1914 New Bag 300 mcg/kg/min × 115.2 kg Intravenous Jacki Dutta, RN    11/21/2024 1835 Rate/Dose Change 300 mcg/kg/min × 115.2 kg Intravenous Liberty Duttaa, RN    11/21/2024 1830 Rate/Dose Change 250 mcg/kg/min × 115.2 kg Intravenous Liberty Duttaa, RN    11/21/2024 1820 Rate/Dose Change 200 mcg/kg/min × 115.2 kg Intravenous Soriano Liberty Kabaa, RN    11/21/2024 1815 Rate/Dose Change 150 mcg/kg/min × 115.2 kg Intravenous Jacki Dutta, RN    11/21/2024 1810 Rate/Dose Change 100 mcg/kg/min × 115.2 kg Intravenous Jacki Dutta, RN    11/21/2024 1804 New Bag 50 mcg/kg/min × 115.2 kg Intravenous Jacki Dutta, RN          famotidine (Pepcid) 20 mg/2mL injection 20 mg       Date Action Dose Route User    11/22/2024 0926 Given 20 mg Intravenous Bridget Berman RN    11/22/2024 0327 Given 20 mg Intravenous Yakov Ugarte RN          fentaNYL (Sublimaze) 50 mcg/mL injection       Date Action Dose Route User    11/22/2024 0046 Given 100 mcg Intravenous Yakov Mcgill MD    11/21/2024 1935 Given 250 mcg Intravenous Yakov Mcgill MD          fentaNYL (Sublimaze) 50 mcg/mL injection 25 mcg       Date Action Dose Route User    11/22/2024 1032 Given 25 mcg Intravenous Bridget Berman RN          gelatin adsorbable (Gelfoam) 100 external spone       Date Action Dose Route User    11/21/2024 2049 Given 2 each Topical Rafa Gregg MD          glycopyrrolate (Robinul) 0.2 MG/ML injection       Date Action Dose Route User    11/21/2024 1935 Given 0.4 mg Intravenous Yakov Mcgill MD          heparin (Porcine) 1000 UNIT/ML injection       Date Action Dose Route User    11/21/2024 2043 Given 46,000 Units Intravenous Yakov Mcgill MD          insulin regular human (Novolin R, Humulin R) 100 Units in sodium chloride 0.9% 100 mL standard infusion (100 mL)       Date Action Dose Route User    11/22/2024  1200 Rate/Dose Change 4 Units/hr Intravenous Bridget Berman RN    11/22/2024 1100 Rate/Dose Change 7 Units/hr Intravenous Bridget Berman, CYNDIE    11/22/2024 0900 Rate/Dose Verify 9 Units/hr Intravenous Bridget Berman, CYNDIE    11/22/2024 0800 Rate/Dose Verify 9 Units/hr Intravenous Bridget Berman, CYNDIE    11/22/2024 0700 Rate/Dose Change 9 Units/hr Intravenous Yakov Ugarte, RN    11/22/2024 0600 Rate/Dose Verify 5 Units/hr Intravenous Yakov Ugarte, RN    11/22/2024 0500 Rate/Dose Change 5 Units/hr Intravenous Yakov Ugarte, RN    11/22/2024 0400 Rate/Dose Change 4 Units/hr Intravenous Yakov Ugarte, RN    11/22/2024 0300 Rate/Dose Change 3 Units/hr Intravenous Yakov Ugarte RN    11/22/2024 0200 Rate/Dose Change 1 Units/hr Intravenous Yakov Ugarte RN    11/21/2024 1944 New Bag 4 Units/hr Intravenous Yakov Mcgill MD          iopamidol 76% (ISOVUE-370) injection for power injector       Date Action Dose Route User    11/21/2024 1743 Given 80 mL Intravenous Александр Atwood          metoclopramide (Reglan) 5 mg/mL injection 10 mg       Date Action Dose Route User    11/22/2024 0843 Given 10 mg Intravenous Bridget Berman RN    11/22/2024 0327 Given 10 mg Intravenous Yakov Ugarte RN          morphINE PF 2 MG/ML injection 2 mg       Date Action Dose Route User    11/21/2024 1749 Given 2 mg Intravenous Jacki Dutta RN          morphINE PF 2 MG/ML injection 2 mg       Date Action Dose Route User    11/21/2024 1818 Given 2 mg Intravenous Jacki Dutta RN          morphINE PF 4 MG/ML injection 4 mg       Date Action Dose Route User    11/22/2024 0823 Given 4 mg Intravenous Bridget Berman RN    11/22/2024 0315 Given 4 mg Intravenous Yakov Ugarte RN          niCARdipine in sodium chloride 0.86% (carDENE) 20 mg/200mL infusion premix       Date Action Dose Route User    11/21/2024 7670 Rate/Dose Change 15 mg/hr  Intravenous Jacki Dutta, RN    11/21/2024 1900 Rate/Dose Change 12.5 mg/hr Intravenous Jacki Dutta, RN    11/21/2024 1850 Rate/Dose Change 10 mg/hr Intravenous Jacki Dutta, RN    11/21/2024 1845 Rate/Dose Change 7.5 mg/hr Intravenous Jacki Dutta, RN    11/21/2024 1840 New Bag 5 mg/hr Intravenous Jacki Dutta, RN          nitroGLYCERIN in dextrose 5% 50 mg/250mL infusion premix       Date Action Dose Route User    11/22/2024 1025 Rate/Dose Change 40 mcg/min Intravenous Bridget Berman, CYNDIE    11/22/2024 0955 Rate/Dose Change 50 mcg/min Intravenous Bridget Berman, RN    11/22/2024 0600 Rate/Dose Verify 60 mcg/min Intravenous Yakov Ugarte, RN    11/22/2024 0500 Rate/Dose Change 60 mcg/min Intravenous Yakov Ugarte, RN    11/22/2024 0430 Rate/Dose Change 50 mcg/min Intravenous Yakov Ugarte, RN    11/22/2024 0345 Restarted 25 mcg/min Intravenous Yakov Ugarte, RN    11/22/2024 0320 Restarted 50 mcg/min Intravenous Yakov Ugarte, RN    11/22/2024 0227 Rate/Dose Change 75 mcg/min Intravenous Yakov Ugarte, RN    11/22/2024 0215 Rate/Dose Change 100 mcg/min Intravenous Yakov Ugarte, RN    11/22/2024 0145 Rate/Dose Change 75 mcg/min Intravenous Yakov Ugarte, RN          ondansetron (Zofran) 4 MG/2ML injection 4 mg       Date Action Dose Route User    11/21/2024 1752 Given 4 mg Intravenous Jacki Dutta RN          ondansetron (Zofran) 4 MG/2ML injection       Date Action Dose Route User    11/21/2024 1752 Given 4 mg Intravenous Jacki Dutta RN          ondansetron (Zofran) 4 MG/2ML injection       Date Action Dose Route User    11/21/2024 1935 Given 4 mg Intravenous Yakov Mcgill MD          aminocaproic acid (Amicar) 10 g in sodium chloride 0.9% 250 mL infusion       Date Action Dose Route User    11/21/2024 2044 New Bag 1 g/hr Intravenous Yakov Mcgill,  MD          aminocaproic acid BOLUS FROM BAG infusion       Date Action Dose Route User    11/21/2024 2044 Given 5 g Intravenous Yakov Mcgill MD          sodium chloride 0.9% 1,000 mL with heparin (Porcine) 10,000 Units mixture (OR nurse to mix)       Date Action Dose Route User    11/21/2024 2049 Given (none) Irrigation Rafa Gregg MD          propofol (Diprivan) 10 MG/ML injection       Date Action Dose Route User    11/21/2024 1935 Given 200 mg Intravenous Yakov Mcgill MD          propofol (Diprivan) 10 MG/ML injection       Date Action Dose Route User    11/22/2024 0320 New Bag 20 mcg/kg/min Intravenous Yakov Ugarte RN          propofol (Diprivan) 10 mg/mL infusion premix       Date Action Dose Route User    11/21/2024 1939 New Bag 100 mcg/kg/min × 115.2 kg Intravenous Yakov Mcgill MD          propofol (Diprivan) 10 mg/mL infusion premix       Date Action Dose Route User    11/22/2024 0716 New Bag 25 mcg/kg/min × 117.1 kg (Dosing Weight) Intravenous Yakov Ugarte RN    11/22/2024 0600 Rate/Dose Verify 25 mcg/kg/min × 117.1 kg (Dosing Weight) Intravenous Yakov Ugarte RN    11/22/2024 0430 Rate/Dose Change 25 mcg/kg/min × 117.1 kg (Dosing Weight) Intravenous Yakov Ugarte RN    11/22/2024 0400 Rate/Dose Verify 20 mcg/kg/min × 117.1 kg (Dosing Weight) Intravenous Yakov Ugarte RN    11/22/2024 0320 New Bag 20 mcg/kg/min × 117.1 kg (Dosing Weight) Intravenous Yakov Ugarte RN          protamine 10 mg/mL injection       Date Action Dose Route User    11/21/2024 2331 Given 460 mg Intravenous Yakov Mcgill MD          protamine 10 mg/mL injection 25 mg       Date Action Dose Route User    11/22/2024 0219 Given 25 mg Intravenous Yakov Ugarte RN          prothrombin complex conc (human) (Kcentra) 1,563 Units in 60 mL infusion       Date Action Dose Route User    11/21/2024 2351 New Bag 1,563 Units  Intravenous Yakov Mcgill MD          rocuronium (Zemuron) 50 mg/5mL injection       Date Action Dose Route User    11/22/2024 0047 Given 50 mg Intravenous Yakov Mcgill MD    11/21/2024 1941 Given 100 mg Intravenous Yakov Mcgill MD          sodium chloride 0.9% infusion 1,000 mL       Date Action Dose Route User    11/21/2024 1749 New Bag 1,000 mL Intravenous Jacki Dutta RN          sodium chloride 0.9% infusion       Date Action Dose Route User    11/21/2024 1929 New Bag (none) Intravenous Yakov Mcgill MD          succinylcholine (Anectine) 20 MG/ML injection       Date Action Dose Route User    11/21/2024 1935 Given 200 mg Intravenous Yakov Mcgill MD          sugammadex (Bridion) 200 MG/2ML injection 460 mg       Date Action Dose Route User    11/22/2024 0255 Given 460 mg Intravenous Yakov Ugarte RN          vancomycin (Vancocin) 1 g injection       Date Action Dose Route User    11/21/2024 2351 Given 2 g Topical Kieran Truong CCP    11/21/2024 2050 Given 2 g Topical Rafa Gregg MD            Vitals (last day)       Date/Time Temp Pulse Resp BP SpO2 Weight O2 Device O2 Flow Rate (L/min) Who    11/22/24 1100 98.7 °F (37.1 °C) 90 17 105/65 96 % -- Ventilator -- LC    11/22/24 1000 98.8 °F (37.1 °C) 92 18 113/60 95 % -- Ventilator -- LC    11/22/24 0900 99 °F (37.2 °C) 93 15 94/57 94 % -- Ventilator -- LC    11/22/24 0800 99 °F (37.2 °C) 92 18 118/61 95 % -- Ventilator -- LC    11/22/24 0700 98.9 °F (37.2 °C) 91 17 128/67 97 % -- Ventilator -- LC    11/22/24 0600 98.8 °F (37.1 °C) 94 21 121/66 97 % -- Ventilator -- AW    11/22/24 0500 98.7 °F (37.1 °C) 96 10 133/75 97 % -- Ventilator -- AW    11/22/24 0430 98.4 °F (36.9 °C) 95 10 -- 98 % -- Ventilator -- AW    11/22/24 0419 -- -- -- -- -- 258 lb 2.5 oz (117.1 kg) -- -- ZB    11/22/24 0400 97.8 °F (36.6 °C) 96 10 127/78 98 % -- Ventilator -- AW    11/22/24 0345 97.5 °F (36.4 °C) 93 10 -- 99 % --  Ventilator -- AW    11/22/24 0330 97 °F (36.1 °C) 98 10 174/96 98 % -- Ventilator -- AW    11/22/24 0315 96.6 °F (35.9 °C) 82 26 -- 94 % -- Ventilator -- AW    11/22/24 0300 95.9 °F (35.5 °C) 90 10 119/68 96 % -- Ventilator -- AW    11/22/24 0245 95.7 °F (35.4 °C) 90 10 106/60 94 % -- Ventilator -- AW    11/22/24 0230 95.7 °F (35.4 °C) 87 10 -- 97 % -- Ventilator -- AW    11/22/24 0215 95.3 °F (35.2 °C) 74 10 -- 99 % -- Ventilator -- AW    11/22/24 0200 95.7 °F (35.4 °C) 75 11 129/80 98 % 258 lb 2.5 oz (117.1 kg) Ventilator -- AW    11/22/24 0145 95.9 °F (35.5 °C) 77 11 -- 99 % -- -- -- AW    11/22/24 0135 97.8 °F (36.6 °C) 79 10 147/88 97 % -- -- -- AS    11/21/24 1915 -- 76 25 95/57 93 % -- None (Room air) -- AN    11/21/24 1910 -- 73 16 94/56 93 % -- None (Room air) -- AN    11/21/24 1903 -- 76 12 92/54 94 % -- None (Room air) -- AN    11/21/24 1900 -- 75 14 109/66 94 % -- None (Room air) -- AN    11/21/24 1855 -- 76 16 98/61 95 % -- -- -- AN    11/21/24 1850 -- 78 18 108/69 94 % -- None (Room air) -- AN    11/21/24 1845 -- 77 17 120/75 96 % -- None (Room air) -- AN    11/21/24 1844 -- 80 18 120/75 96 % -- None (Room air) -- AN    11/21/24 1841 -- 87 24 134/84 95 % -- None (Room air) -- AN    11/21/24 1836 -- 77 16 143/82 97 % -- None (Room air) -- AN    11/21/24 1830 -- 83 16 148/87 96 % -- None (Room air) -- AN    11/21/24 1828 -- -- -- -- -- -- None (Room air) -- AN    11/21/24 1825 -- 83 11 139/84 95 % -- None (Room air) -- AN    11/21/24 1821 -- 86 15 144/86 95 % -- None (Room air) -- AN    11/21/24 1815 -- 88 15 149/80 97 % -- None (Room air) -- AN    11/21/24 1756 -- 87 18 151/85 100 % -- None (Room air) -- AN    11/21/24 1745 -- 81 25 146/78 100 % -- None (Room air) -- AN    11/21/24 1743 97.5 °F (36.4 °C) -- -- -- -- -- -- -- GD    11/21/24 1730 -- 70 17 103/59 99 % -- None (Room air) -- GD    11/21/24 1717 -- 64 18 93/48 99 % -- None (Room air) -- AN          CIWA Scores (since admission)        Date/Time CIWA-Ar Total Who    11/22/24 0900 0 LC          Blood Transfusion Record       Product Unit Status Volume Start End            Transfuse cryoprecipitate       24  [id number]  [id number] Completed 11/22/24 0143 350 mL 11/22/24 0012 11/22/24 0022       24  [id number]  [id number] Completed 11/22/24 0143 350 mL 11/22/24 0008 11/22/24 0018                Transfuse platelets       24  [id number]  [id number] Completed 11/22/24 0143 350 mL 11/21/24 2359 11/22/24 0009       24  [id number]  [id number] Completed 11/22/24 0143 350 mL 11/21/24 2346 11/21/24 2356

## 2024-11-22 NOTE — CONSULTS
[facility]  part of [facility]  [facility]   Report of Consultation    Alisha Wilde Patient Status:  Emergency    10/25/1965 MRN [id number]   Location [facility] EMERGENCY DEPARTMENT Attending Nika Echavarria MD   Hosp Day # 0 PCP Bridger Avendano MD     Date of Admission:  2024  Date of Consult:  2024    Reason for Consultation:  Aortic dissection    History of Present Illness:  Alisha Wilde is a a(n) 59 year old female. Presented to ED via EMS with 30 minute history of chest and back pain.  CT demonstrates acute type a aortic dissection, we are consulted for evaluation.  Patient complains of chest pain and back pain.  Denies any abdominal pain or pain in her extremities.  No history of aneurysm as far as the patient knows.    History:  Past Medical History:    Chronic kidney disease (CKD)    Esophageal reflux    High blood pressure    High cholesterol    HYPERTENSION    Hypertension    Unspecified essential hypertension     Past Surgical History:   Procedure Laterality Date    Colonoscopy N/A 2018    Procedure: COLONOSCOPY;  Surgeon: Magdy Webber MD;  Location: [facility] ENDOSCOPY    Endometrial ablation      child passed away for 5 mins          1    Other surgical history  11    cysto-Dr. Lacey -- pt denies     Family History   Problem Relation Age of Onset    Hypertension Mother     Heart Disorder Mother 70    Other (Other) Mother         kidney failure    Hypertension Father     Hypertension Maternal Grandfather     Cancer Neg     Diabetes Neg     Glaucoma Neg     Macular degeneration Neg       reports that she quit smoking about 25 years ago. Her smoking use included cigarettes. She started smoking about 38 years ago. She has a 13 pack-year smoking history. She has quit using smokeless tobacco. She reports current alcohol use of about 1.0 - 2.0 standard drink of alcohol per week. She reports that she does not use  drugs.    Allergies:  Allergies[1]    Medications:    Current Facility-Administered Medications:     esmolol (Brevibloc) 2000 mg/100mL infusion premix,  mcg/kg/min, Intravenous, Continuous    niCARdipine in sodium chloride 0.86% (carDENE) 20 mg/200mL infusion premix, 5-15 mg/hr, Intravenous, Continuous    Review of Systems:  Pertinent items are noted in HPI.    Physical Exam:  Blood pressure 98/61, pulse 76, temperature 97.5 °F (36.4 °C), temperature source Oral, resp. rate 16, SpO2 95%, not currently breastfeeding.    GENERAL: Mild to moderate distress, patient very tearful.  VITAL SIGNS: See above.  HEENT: Trachea midline.  No jugular venous distention.   CHEST: Chest wall is nontender.  HEART: Regular rate and rhythm without murmurs.  LUNGS: Coarse breath sounds bilaterally  ABDOMEN: Soft, nontender nondistended.  VASCULAR: Palpable radial artery pulses 2+ bilateral, palpable right femoral pulse on the right.  I do not appreciate a palpable femoral pulse in the left.  SKIN: No rash, no excessive bruising, petechiae, or purpura.  NEUROLOGIC: Cranial nerves II-XII, appears neuro intact, alert and oriented x 4      Laboratory Data:  Recent Labs   Lab 11/21/24  1720   RBC 4.58   HGB 12.2   HCT 38.3   MCV 83.6   MCH 26.6   MCHC 31.9   RDW 15.9*   NEPRELIM 3.70   WBC 6.4   .0       Recent Labs   Lab 11/21/24  1720   *   BUN 21   CREATSERUM 1.19*   EGFRCR 52*   CA 9.6   *   K 3.4*      CO2 28.0     Narrative   PROCEDURE: CT CHEST ABDOMEN DISSECT SET (CPT=71275/83432)     COMPARISON: None.     INDICATIONS: chest pain     TECHNIQUE:   CT images of the chest and abdomen were obtained with intravenous contrast material.  Automated exposure control for dose reduction was used. Adjustment of the mA and/or kV was done based on the patient's size. Use of iterative  reconstruction technique for dose reduction was used.  Dose information is transmitted to the ACR (American College of Radiology)  NRDR (National Radiology Data Registry) which includes the Dose Index Registry.        Aorta/vascular:  A type a aortic dissection beginning just above the right coronary artery and extending distally into the left  common iliac and left external iliac resulting and significant narrowing of left external iliac at the edge of  the field of view.  The false lumen is low right lateral along the ascending aorta and left lateral along the  descending aorta.  The innominate, left common carotid and subclavian all, off of the true lumen and are  patent.  Celiac, SMA, right renal artery are patent and arise from the true lumen.  The left renal artery  straddles the true and false lumen and the dissection extends into the origin of the left renal artery and  results in approximately 50 % narrowing of the origin.  Beyond the origin, the left renal artery is patent.    The inferior mesenteric artery arises from the false lumen.  In the upper abdomen, the false lumen occupies  about 50 to 60 % of aortic diameter in the juxtarenal region, and in the infrarenal region the false lumen  Flow a a a gutter I going to read this rule quick is a type a dissection or call urate back thanks by a low yes please high melonie cimetidine Alisha lul as a type a dissection it begins just above the right coronary goes down into the left iliac and the  left external iliac locks like it does not have much blood in the thank you by occupies about 20 %.  CHEST FINDINGS:  CARDIAC: Mild coronary artery calcification.  Mildly dilated left ventricle.    MEDIASTINUM/HAMLET: No mass or adenopathy.    PULMONARY ARTERIES: Top-normal main pulmonary artery.  No pulmonary embolus through segmental level.  LUNGS/PLEURA: Subsegmental atelectasis and or scar in lower lungs.  No consolidation or pleural effusion.  Trachea and mainstem bronchi are patent.  CHEST WALL: No mass or axillary adenopathy.    OTHER: Negative.     ABDOMEN FINDINGS: Evaluation of solid organs is  limited to the arterial phase of contrast-enhancement.  LIVER: Mild hepatic steatosis.  SPLEEN: Normal.    PANCREAS: Normal.    BILIARY: No evidence for cholecystitis or biliary dilatation.  ADRENALS: Normal.      KIDNEYS: Bilateral simple renal cysts.  A 2 mm nonobstructing right lower pole renal calculus.  LYMPHADENOPATHY: None.  GI/MESENTERY: Small to moderate hiatal hernia.  Colonic diverticulosis.  No pneumatosis, portal venous gas or other findings of ischemia.  No visible mass, obstruction, or bowel wall thickening.    ABDOMINAL WALL: Fat containing umbilical hernia.    ASCITES:                           None  OTHER: Negative.     BONES: No suspect bone lesion or fracture.               Impression   CONCLUSION:  1. Type a aortic dissection beginning just above right renal artery and extending distally into the left common iliac artery and external iliac.  Findings were called to Dr. Echavarria at 5:52 p.m.       Impression and Plan:  Patient Active Problem List   Diagnosis    Hypercholesteremia    Alcoholism (HCC)    Essential hypertension    Vitamin D deficiency    Adjustment disorder with mixed anxiety and depressed mood    Controlled type 2 diabetes mellitus without complication, without long-term current use of insulin (HCC)    Obesity (BMI 30-39.9)    Neck mass    Polyp of colon    Flat feet, bilateral    Hypokalemia    Myalgia due to statin    Age-related nuclear cataract of both eyes    Positive for microalbuminuria       59-year-old female with acute type a aortic dissection.    Proceed emergently to the operating room for repair of acute type a aortic dissection.  I explained 50% risk of mortality given acute presentation.  I explained risks of the operation: Limited to: Bleeding, transfusion, coagulopathy, wound infection, delayed union, dehiscence, stroke, encephalopathy, alcohol withdrawal, myocardial infarction, prolonged ventilation, tracheostomy, visceral malperfusion, extremity malperfusion, loss  of limb, arrhythmia, atrial fibrillation, kidney insufficiency, kidney failure and dialysis, organ dysfunction, organ failure, imponderables and death.  Patient proceeding urgently to the operating room once staff is available.  Her brother was updated of the above as well.  Via phone.    Rafa Gregg MD  Cardiothoracic Surgery  [facility]     Time spent on counseling/coordination of care:  75697- 80 min    Total time spent with patient:  1 Hour    Rafa Gregg MD  11/21/2024  7:01 PM       [1]   Allergies  Allergen Reactions    Atorvastatin MYALGIA    Pravastatin MYALGIA    Rosuvastatin MYALGIA    Seasonal Runny nose

## 2024-11-22 NOTE — CONSULTS
[facility]  part of [facility]    Cardiology Consultation    Alisha Wilde Patient Status:  Inpatient    10/25/1965 MRN [id number]   Location [facility] 2W/SW Attending Lizeth Buck MD   Hosp Day # 0 PCP Bridger Avendano MD     Date of Admission:  2024  Date of Consult:  2024  Reason for Consultation: Type A Aortic Dissection, hypertension    History of Present Illness:   Patient is a 59 year old female with sig PMH/o CKD-III, hyperlipidemia, hypertension, EtOH abuse, morbid obesity who presented with acute onset sharp chest pain, found to have type B aortic dissection.  Taken emergently to the OR, underwent successful repair. Post-operatively had hypertensive, started on nitroglycerin gtt and clevidipine gtt with improvement in BP.   Currently intubated, sedated.  Assessment and Plan:   Type A aortic dissection  CKD-III  Obesity  Hypertension  EtOH abuse  -presented with acute onset CP   -CT with type A aortic dissection above right coronary artery and extending distally into the left common iliac artery and external iliac   -s/p emergent successful repair on 24  -TTE is pending  -continue BP management, on clevidipine and nitroglycerin gtt  -CT management per CV surgery  -[facility] protocol  -monitor rhythm on tele    Past Medical History  Past Medical History:    Chronic kidney disease (CKD)    Esophageal reflux    High blood pressure    High cholesterol    HYPERTENSION    Hypertension    Unspecified essential hypertension       Past Surgical History  Past Surgical History:   Procedure Laterality Date    Colonoscopy N/A 2018    Procedure: COLONOSCOPY;  Surgeon: Magdy Webber MD;  Location: [facility] ENDOSCOPY    Endometrial ablation      child passed away for 5 mins          1    Other surgical history  11    cysto-Dr. Lacey -- pt denies       Family History  Family History   Problem Relation Age of Onset    Hypertension Mother     Heart  Disorder Mother 70    Other (Other) Mother         kidney failure    Hypertension Father     Hypertension Maternal Grandfather     Cancer Neg     Diabetes Neg     Glaucoma Neg     Macular degeneration Neg        Social History  Pediatric History   Patient Parents    Not on file     Other Topics Concern    Not on file   Social History Narrative    Not on file           Current Medications:  Current Facility-Administered Medications   Medication Dose Route Frequency    acetaminophen (Ofirmev) 10 mg/mL infusion premix 1,000 mg  1,000 mg Intravenous Q8H    melatonin tab 3 mg  3 mg Oral Nightly PRN    sennosides (Senokot) tab 8.6 mg  8.6 mg Oral BID    docusate sodium (Colace) cap 100 mg  100 mg Oral BID    polyethylene glycol (PEG 3350) (Miralax) 17 g oral packet 17 g  17 g Oral Daily PRN    bisacodyl (Dulcolax) 10 MG rectal suppository 10 mg  10 mg Rectal Daily PRN    metoclopramide (Reglan) 5 mg/mL injection 10 mg  10 mg Intravenous Q6H    ondansetron (Zofran) 4 MG/2ML injection 4 mg  4 mg Intravenous Q6H PRN    famotidine (Pepcid) tab 20 mg  20 mg Oral BID    Or    famotidine (Pepcid) 20 mg/2mL injection 20 mg  20 mg Intravenous BID    dexmedeTOMIDine in sodium chloride 0.9% (Precedex) 400 mcg/100mL infusion premix  0.2-1.5 mcg/kg/hr (Dosing Weight) Intravenous Continuous    DOBUTamine in dextrose 5% (Dobutrex) 500 mg/250mL infusion premix  2.5-20 mcg/kg/min (Dosing Weight) Intravenous Continuous PRN    nitroGLYCERIN in dextrose 5% 50 mg/250mL infusion premix  5-300 mcg/min Intravenous Continuous PRN    norepinephrine (Levophed) 4 mg/250mL infusion premix  0.5-30 mcg/min Intravenous Continuous PRN    metoprolol tartrate (Lopressor) tab 25 mg  25 mg Oral 2x Daily(Beta Blocker)    clevidipine (Cleviprex) 25 MG/50ML IV infusion  1-6 mg/hr Intravenous Continuous    potassium chloride 20 mEq/100mL IVPB premix 20 mEq  20 mEq Intravenous PRN    Or    potassium chloride 40 mEq/100mL IVPB premix (central line) 40 mEq  40 mEq  Intravenous PRN    magnesium sulfate in dextrose 5% 1 g/100mL infusion premix 1 g  1 g Intravenous PRN    magnesium sulfate in sterile water for injection 2 g/50mL IVPB premix 2 g  2 g Intravenous PRN    mupirocin (Bactroban) 2% nasal ointment 1 Application  1 Application Nasal BID    chlorhexidine gluconate (Peridex) 0.12 % oral solution 15 mL  15 mL Mouth/Throat BID    [START ON 11/23/2024] multivitamin (Tab-A-Treasure/Beta Carotene) tab 1 tablet  1 tablet Oral Daily    [START ON 11/23/2024] ascorbic acid (Vitamin C) tab 500 mg  500 mg Oral TID    dextrose 5%-sodium chloride 0.45% infusion   mL/hr Intravenous Continuous    morphINE PF 2 MG/ML injection 2 mg  2 mg Intravenous Q2H PRN    Or    morphINE PF 4 MG/ML injection 4 mg  4 mg Intravenous Q2H PRN    albumin human (Albumin) 5% injection 12.5 g  12.5 g Intravenous Once PRN    ceFAZolin (Ancef) 2g in 10mL IV syringe premix  2 g Intravenous Q8H    propofol (Diprivan) 10 mg/mL infusion premix  5-50 mcg/kg/min (Dosing Weight) Intravenous Continuous    fentaNYL (Sublimaze) 50 mcg/mL injection 25 mcg  25 mcg Intravenous Q3H PRN    norepinephrine (Levophed) 4 mg/250mL infusion premix  0.5-30 mcg/min Intravenous Continuous    insulin regular human (Novolin R, Humulin R) 100 Units in sodium chloride 0.9% 100 mL standard infusion (100 mL)  1-40 Units/hr Intravenous Continuous     Medications Prior to Admission   Medication Sig    Acetaminophen ER (TYLENOL 8 HOUR) 650 MG Oral Tab CR Take 2 tablets (1,300 mg total) by mouth daily as needed.    Calcium Carb-Cholecalciferol 600-10 MG-MCG Oral Tab Take 1 tablet by mouth daily.    metoprolol succinate ER 50 MG Oral Tablet 24 Hr Take 1 tablet (50 mg total) by mouth daily.    Losartan Potassium-HCTZ 100-12.5 MG Oral Tab Take 1 tablet by mouth daily.    Potassium Chloride ER 20 MEQ Oral Tab CR Take 1 tablet by mouth daily.    albuterol 108 (90 Base) MCG/ACT Inhalation Aero Soln Inhale 2 puffs into the lungs every 4 (four) hours  as needed for Wheezing.    amLODIPine 10 MG Oral Tab Take 1 tablet (10 mg total) by mouth daily.    Ascorbic Acid (VITAMIN C) 1000 MG Oral Tab Take 1 tablet (1,000 mg total) by mouth daily.    vitamin B-12 50 MCG Oral Tab Take 1 tablet (50 mcg total) by mouth daily.    fluticasone propionate 50 MCG/ACT Nasal Suspension 2 sprays by Nasal route daily.    fluticasone-salmeterol 250-50 MCG/ACT Inhalation Aerosol Powder, Breath Activated Inhale 1 puff into the lungs 2 (two) times daily. inhale 1 puff by INHALATION route 2 times every day morning and evening approximately 12 hours apart (Patient not taking: Reported on 11/21/2024)       Allergies  Allergies[1]    Review of Systems:   ROS  10 point review of systems completed and negative except as noted.    Physical Exam:   Patient Vitals for the past 24 hrs:   BP Temp Temp src Pulse Resp SpO2 Height Weight   11/22/24 1200 -- 98.7 °F (37.1 °C) Pulmonary Ar -- -- -- -- --   11/22/24 1100 105/65 98.7 °F (37.1 °C) Pulmonary Ar 90 17 96 % -- --   11/22/24 1000 113/60 98.8 °F (37.1 °C) Pulmonary Ar 92 18 95 % -- --   11/22/24 0900 94/57 99 °F (37.2 °C) Pulmonary Ar 93 15 94 % -- --   11/22/24 0816 -- -- -- -- -- -- 5' 5\" (1.651 m) --   11/22/24 0800 118/61 99 °F (37.2 °C) Pulmonary Ar 92 18 95 % -- --   11/22/24 0700 128/67 98.9 °F (37.2 °C) Pulmonary Ar 91 17 97 % -- --   11/22/24 0600 121/66 98.8 °F (37.1 °C) Pulmonary Ar 94 21 97 % -- --   11/22/24 0500 133/75 98.7 °F (37.1 °C) Pulmonary Ar 96 10 97 % -- --   11/22/24 0430 -- 98.4 °F (36.9 °C) Pulmonary Ar 95 10 98 % -- --   11/22/24 0419 -- -- -- -- -- -- -- 258 lb 2.5 oz (117.1 kg)   11/22/24 0400 127/78 97.8 °F (36.6 °C) Pulmonary Ar 96 10 98 % -- --   11/22/24 0345 -- 97.5 °F (36.4 °C) Pulmonary Ar 93 10 99 % -- --   11/22/24 0330 (!) 174/96 97 °F (36.1 °C) Pulmonary Ar 98 10 98 % -- --   11/22/24 0315 -- 96.6 °F (35.9 °C) Pulmonary Ar 82 26 94 % -- --   11/22/24 0300 119/68 (!) 95.9 °F (35.5 °C) Pulmonary Ar 90 10 96 %  -- --   11/22/24 0245 106/60 (!) 95.7 °F (35.4 °C) Pulmonary Ar 90 10 94 % -- --   11/22/24 0230 -- (!) 95.7 °F (35.4 °C) Pulmonary Ar 87 10 97 % -- --   11/22/24 0215 -- (!) 95.3 °F (35.2 °C) Pulmonary Ar 74 10 99 % -- --   11/22/24 0200 129/80 (!) 95.7 °F (35.4 °C) Pulmonary Ar 75 11 98 % -- 258 lb 2.5 oz (117.1 kg)   11/22/24 0145 -- (!) 95.9 °F (35.5 °C) Pulmonary Ar 77 11 99 % -- --   11/22/24 0135 147/88 97.8 °F (36.6 °C) -- 79 10 97 % -- --   11/21/24 1915 95/57 -- -- 76 25 93 % -- --   11/21/24 1910 94/56 -- -- 73 16 93 % -- --   11/21/24 1903 92/54 -- -- 76 12 94 % -- --   11/21/24 1900 109/66 -- -- 75 14 94 % -- --   11/21/24 1855 98/61 -- -- 76 16 95 % -- --   11/21/24 1850 108/69 -- -- 78 18 94 % -- --   11/21/24 1845 120/75 -- -- 77 17 96 % -- --   11/21/24 1844 120/75 -- -- 80 18 96 % -- --   11/21/24 1841 134/84 -- -- 87 24 95 % -- --   11/21/24 1836 143/82 -- -- 77 16 97 % -- --   11/21/24 1830 148/87 -- -- 83 16 96 % -- --   11/21/24 1825 139/84 -- -- 83 11 95 % -- --   11/21/24 1821 144/86 -- -- 86 15 95 % -- --   11/21/24 1815 149/80 -- -- 88 15 97 % -- --   11/21/24 1756 151/85 -- -- 87 18 100 % -- --   11/21/24 1745 146/78 -- -- 81 25 100 % -- --   11/21/24 1743 -- 97.5 °F (36.4 °C) Oral -- -- -- -- --   11/21/24 1730 103/59 -- -- 70 17 99 % -- --   11/21/24 1717 93/48 -- -- 64 18 99 % -- --       Intake/Output:   Last 3 shifts:   Intake/Output                   11/20/24 0700 - 11/21/24 0659 (Not Admitted) 11/21/24 0700 - 11/22/24 0659 11/22/24 0700 - 11/23/24 0659       Intake    P.O.  --  0  --    P.O. -- 0 --    I.V.  --  3161.8  --    I.V. -- 1117.3 --    Volume (mL) Propofol -- 44.5 --    Volume (mL) (sodium chloride 0.9% infusion) -- 2000 --    Blood  --  2200  --    Cell Saver Blood  -- 800 --    Volume (Transfuse platelets) -- 350 --    Volume (Transfuse platelets) -- 350 --    Volume (Transfuse cryoprecipitate) -- 350 --    Volume (Transfuse cryoprecipitate) -- 350 --    IV PIGGYBACK   --  80  --    Volume (mL) (ceFAZolin (Ancef) 2g in 10mL IV syringe premix) -- 20 --    Volume (mL) (prothrombin complex conc (human) (Kcentra) 1,563 Units in 60 mL infusion) -- 60 --    Total Intake -- 5441.8 --       Output    Urine  --  2095  259    Urine -- 1500 --    Output (mL) (Urethral Catheter Latex;Temperature probe;Double-lumen) -- 595 259    Chest Tube  --  100  73    Output (mL) (Chest Tube Anterior Mediastinal 32 Fr.) -- 10 15    Output (mL) (Chest Tube Anterior Pericardial 24 Fr.) -- 90 58    Total Output -- 2195 332       Net I/O     -- 3246.8 -332          Vent Settings: Vent Mode: VC/AC  FiO2 (%):  [50 %-60 %] 60 %  S RR:  [10-14] 12  S VT:  [450 mL-800 mL] 550 mL  PEEP/CPAP (cm H2O):  [5 cm H20-7 cm H20] 7 cm H20  MAP (cm H2O):  [9-11] 11  Hemodynamic parameters (last 24 hours): PAP: (20-33)/(11-24) 27/20  CO:  [3.7 L/min-5.7 L/min] 4.2 L/min  CI:  [1.7 L/min/m2-2.6 L/min/m2] 2 L/min/m2  Scheduled Meds:    acetaminophen  1,000 mg Intravenous Q8H    sennosides  8.6 mg Oral BID    docusate sodium  100 mg Oral BID    metoclopramide  10 mg Intravenous Q6H    famotidine  20 mg Oral BID    Or    famotidine  20 mg Intravenous BID    metoprolol tartrate  25 mg Oral 2x Daily(Beta Blocker)    mupirocin  1 Application Nasal BID    chlorhexidine gluconate  15 mL Mouth/Throat BID    [START ON 11/23/2024] multivitamin  1 tablet Oral Daily    [START ON 11/23/2024] ascorbic acid  500 mg Oral TID    ceFAZolin  2 g Intravenous Q8H     Continuous Infusions:    dexmedetomidine 1.5 mcg/kg/hr (11/22/24 1209)    DOBUTamine 1 mcg/kg/min (11/22/24 0600)    nitroGLYCERIN in dextrose 5% 50 mcg/min (11/22/24 1213)    norepinephrine      clevidipine 0.5 mg/hr (11/22/24 1221)    dextrose 5%-sodium chloride 0.45% 40 mL/hr (11/22/24 0600)    propofol 25 mcg/kg/min (11/22/24 0716)    norepinephrine      insulin regular 4 Units/hr (11/22/24 1200)       Physical Exam:  General: intubated, sedated  HEENT: Normocephalic, anicteric  sclera, neck supple, no thyromegaly or adenopathy.  Neck: No JVD, carotids 2+, no bruits.  Cardiac: Regular rate and rhythm. S1, S2 normal.   Lungs: +mech BS  Abdomen: Soft, non-tender. No organosplenomegally, mass or rebound, BS-present.  Extremities: Without clubbing or cyanosis. + edema.    Neurologic: Alert and oriented, normal affect. No focal defects  Skin: Warm and dry.     Results:   Laboratory Data:  Lab Results   Component Value Date    WBC 10.3 11/22/2024    HGB 10.6 (L) 11/22/2024    HCT 32.7 (L) 11/22/2024    .0 11/22/2024    CREATSERUM 1.60 (H) 11/22/2024    BUN 24 (H) 11/22/2024     11/22/2024    K 4.0 11/22/2024     11/22/2024    CO2 20.0 (L) 11/22/2024     (H) 11/22/2024    CA 8.8 11/22/2024    ALB 4.3 11/21/2024    ALKPHO 90 11/21/2024    TP 6.9 11/21/2024    AST 27 11/21/2024    ALT 22 11/21/2024    PTT 28.2 11/22/2024    INR 1.25 (H) 11/22/2024    PTP 16.4 (H) 11/22/2024    TSH 0.704 09/29/2024    DDIMER 0.42 12/08/2019    ESRML 15 06/05/2021    MG 2.3 11/22/2024    PHOS 2.6 08/12/2020    TROP <0.045 12/08/2019     (H) 09/23/2021    B12 1,156 (H) 07/23/2018         Recent Labs   Lab 11/21/24  1720 11/22/24  0251 11/22/24  1015   * 194* 180*   BUN 21 20 24*   CREATSERUM 1.19* 1.25* 1.60*   CA 9.6 8.7 8.8   * 143 143   K 3.4* 4.0 4.0    111 112   CO2 28.0 22.0 20.0*     Recent Labs   Lab 11/21/24  1720 11/22/24  0133 11/22/24  1015   RBC 4.58 3.38* 3.93   HGB 12.2 9.3* 10.6*   HCT 38.3 28.0* 32.7*   MCV 83.6 82.8 83.2   MCH 26.6 27.5 27.0   MCHC 31.9 33.2 32.4   RDW 15.9* 15.9* 16.0*   NEPRELIM 3.70  --   --    WBC 6.4 14.2* 10.3   .0 201.0 171.0       No results for input(s): \"BNPML\" in the last 168 hours.    No results for input(s): \"TROP\", \"CK\" in the last 168 hours.    Imaging:  XR CHEST AP PORTABLE  (CPT=71045)    Result Date: 11/22/2024  CONCLUSION: Post feeding tube placement with tip in the distal esophagus approximately 4 cm above  the gastroesophageal junction.  Recommend advancement of the tube by approximately 10 cm to place it in the stomach.   Dictated by (CST): Jagjit Jin MD on 11/22/2024 at 11:53 AM     Finalized by (CST): Jagjit Jin MD on 11/22/2024 at 11:55 AM          XR CHEST AP PORTABLE  (CPT=71045)    Result Date: 11/22/2024  CONCLUSION:   Lines and tubes in place as described.  Trace pulmonary vascular redistribution without edema, unchanged.  Preliminary report was submitted by the [facility] teleradiologist and there are no significant discrepancies.  Dictated by (CST): Terrence Colorado MD on 11/22/2024 at 8:40 AM     Finalized by (CST): Terrence Colorado MD on 11/22/2024 at 8:42 AM          XR CHEST AP PORTABLE  (CPT=71045)    Result Date: 11/22/2024  CONCLUSION:   No radiopaque foreign body.  Right internal jugular Abercrombie-Dev catheter at the level of the right ventricular outflow tract.  Upper mediastinal surgical clips.  Endotracheal tube seen 2.6 cm above the jennyfer.  Preliminary report was submitted by the [facility] teleradiologist and there are no significant discrepancies.    Dictated by (CST): Terrence Colorado MD on 11/22/2024 at 8:03 AM     Finalized by (CST): Terrence Colorado MD on 11/22/2024 at 8:05 AM          CTA CHEST+CTA ABDOMEN DISSECT SET (CPT=71275/86607)    Addendum Date: 11/22/2024    This report includes an Addendum and supersedes previous reports for this exam.    PROCEDURE: CT CHEST ABDOMEN DISSECT SET (CPT=71275/75221)  COMPARISON: None.  INDICATIONS: chest pain  TECHNIQUE:   CT images of the chest and abdomen were obtained with intravenous contrast material.  Automated exposure control for dose reduction was used. Adjustment of the mA and/or kV was done based on the patient's size. Use of iterative reconstruction technique for dose reduction was used.  Dose information is transmitted to the ACR (American College of Radiology) NRDR (National Radiology Data Registry) which includes the Dose Index Registry.   Aorta/vascular: A  type a aortic dissection beginning just above the right coronary artery and extending distally into the left common iliac and left external iliac resulting and significant narrowing of left external iliac at the edge of the field of view.  The false lumen is low right lateral along the ascending aorta and left lateral along the descending aorta.  The innominate, left common carotid and subclavian all, off of the true lumen and are patent.  Celiac, SMA, right renal artery are patent and arise from the true lumen.  The left renal artery straddles the true and false lumen and the dissection extends into the origin of the left renal artery and results in approximately 50 % narrowing of the origin.  Beyond the origin, the left renal artery is patent.  The inferior mesenteric artery arises from the false lumen.  In the upper abdomen, the false lumen occupies about 50 to 60 % of aortic diameter in the juxtarenal region, and in the infrarenal region the false lumen Flow a a a gutter I going to read this rule quick is a type a dissection or call urate back thanks by a low yes please high melonie cimetidine Alisha lul as a type a dissection it begins just above the right coronary goes down into the left iliac and the left external iliac locks like it does not have much blood in the thank you by occupies about 20 %. CHEST FINDINGS: CARDIAC: Mild coronary artery calcification.  Mildly dilated left ventricle.  MEDIASTINUM/HAMLET: No mass or adenopathy.  PULMONARY ARTERIES: Top-normal main pulmonary artery.  No pulmonary embolus through segmental level. LUNGS/PLEURA: Subsegmental atelectasis and or scar in lower lungs.  No consolidation or pleural effusion.  Trachea and mainstem bronchi are patent. CHEST WALL: No mass or axillary adenopathy.  OTHER: Negative.  ABDOMEN FINDINGS: Evaluation of solid organs is limited to the arterial phase of contrast-enhancement. LIVER: Mild hepatic steatosis. SPLEEN: Normal.  PANCREAS: Normal.   BILIARY: No evidence for cholecystitis or biliary dilatation. ADRENALS: Normal.    KIDNEYS: Bilateral simple renal cysts.  A 2 mm nonobstructing right lower pole renal calculus. LYMPHADENOPATHY: None. GI/MESENTERY: Small to moderate hiatal hernia.  Colonic diverticulosis.  No pneumatosis, portal venous gas or other findings of ischemia.  No visible mass, obstruction, or bowel wall thickening.  ABDOMINAL WALL: Fat containing umbilical hernia.  ASCITES:                           None OTHER: Negative.  BONES: No suspect bone lesion or fracture.  CONCLUSION:  1. Type a aortic dissection beginning just above right renal (correction:  Right coronary)  artery and extending distally into the left common iliac artery and external iliac.  Findings were called to Dr. Echavarria at 5:52 p.m.    Dictated by (CST): Balaji Alan MD on 11/21/2024 at 5:45 PM     Finalized by (CST): Balaji Alan MD on 11/21/2024 at 5:56 PM     ADDENDUM:  Correction to the conclusion: Type a aortic dissection beginning just above right coronary artery and extending distally into the left common iliac and external iliac .   Dictated by (CST): Balaji Alan MD on 11/22/2024 at 0:16 AM     Finalized by (CST): Balaji Alan MD on 11/22/2024 at 0:17 AM             Result Date: 11/22/2024  CONCLUSION:  1. Type a aortic dissection beginning just above right renal artery and extending distally into the left common iliac artery and external iliac.  Findings were called to Dr. Echavarria at 5:52 p.m.    Dictated by (CST): Balaji Alan MD on 11/21/2024 at 5:45 PM     Finalized by (CST): Balaji Alan MD on 11/21/2024 at 5:56 PM          XR CHEST AP PORTABLE  (CPT=71045)    Result Date: 11/21/2024  CONCLUSION: Cardiac enlargement with secondary signs of slight/early fluid overload.    Dictated by (CST): Bo Ramachandran MD on 11/21/2024 at 6:25 PM     Finalized by (CST): Bo Ramachandran MD on 11/21/2024 at 6:25 PM           Thank you for allowing me to  participate in the care of your patient.    Edward Montgomery, [facility] [place]       [1]   Allergies  Allergen Reactions    Atorvastatin MYALGIA    Pravastatin MYALGIA    Rosuvastatin MYALGIA    Seasonal Runny nose

## 2024-11-22 NOTE — ANESTHESIA PROCEDURE NOTES
Procedure Performed: VIJAY       Start Time:  11/22/2024 7:55 PM       End Time:   11/23/2024 1:00 AM    Preanesthesia Checklist:  Patient identified, IV assessed, risks and benefits discussed, monitors and equipment assessed, procedure being performed at surgeon's request and anesthesia consent obtained.    General Procedure Information  Diagnostic Indications for Echo:  assessment of ascending aorta, assessment of surgical repair and hemodynamic monitoring  Physician Requesting Echo: Rafa Gregg MD  Location performed:  OR  Intubated  Bite block placed  Heart visualized  Probe Insertion:  Easy  Probe Type:  Multiplane  Modalities:  Pulse wave Doppler    Echocardiographic and Doppler Measurements    Ventricles    Right Ventricle:  Cavity size normal.  Hypertrophy not present.  Thrombus not present.  Global function normal.    Left Ventricle:  Cavity size normal.  Hypertrophy not present.  Thrombus not present.  Global Function normal.          Valves    Aortic Valve:  Annulus normal.  Stenosis not present.  Regurgitation absent.  Leaflets normal.  Leaflet motions normal.      Mitral Valve:  Annulus normal.  Stenosis not present.  Regurgitation absent.  Leaflets normal.  Leaflet motions normal.      Tricuspid Valve:  Annulus normal.        Aorta    Ascending Aorta:  Dissection present.    Aortic Arch:  Dissection present.        Atria    Right Atrium:  Size normal.  Spontaneous echo contrast not present.  Thrombus not present.  Tumor not present.  Device not present.      Left Atrium:  Size normal.  Spontaneous echo contrast not present.  Thrombus not present.  Tumor not present.  Device not present.    Left atrial appendage normal.      Septa    Atrial Septum:  Intra-atrial septal morphology normal.      Ventricular Septum:  Intra-ventricular septum morphology normal.          Other Findings  Pericardium:  normal      Anesthesia Information  Performed Personally  Anesthesiologist:  Yakov Mcgill MD

## 2024-11-22 NOTE — BRIEF PROCEDURE NOTE
Endotracheal Intubation Procedure Note      Procedure: Endotracheal intubation    Reason for intubation: self extubated, respiratory failure     Performed by: Dr. SIDDHARTH Rainey DO    Airway Evaluation: Mallampati class 4    Neck Movement: Full ROM    Ventilation: Patient was pre-oxygenated for >5 minutes on 100% FiO2.    Induction/Medications: Versed 3 mg, Fentanyl 100 mcg  LAST CHEMISTRY PANEL  Lab Results   Component Value Date     (H) 11/22/2024    K 4.0 11/22/2024    ALKPHOS 55 05/11/2015         Procedure:  Technique used: glidescope direct visualization  Location of tube: 23 at lip  Tube depth: 23  Tube size: 7.5  Cuffed tube: yes  Blade Used: 4  Blade Size: S4  Insertion Attempts: 2  Stylet: yes  Placement confirmed by: ETCO2 and auscultation and visualization by bronchocope  Insertion Events: pt with profuse dark green emesis and vomiting noted during intubation required suctioning. Has a large tongue and small vocal cord opening making intubation difficult and required 2 attempts to intubate the pt. Stat bronchoscope used after intubation to confirm appropriate ETT positioning and to aspirate gastric contents from her airway.    Patient tolerated intubation well and vitals remained stable.    RN had difficulty placing OGT so using glidescope, the OGT was placed under direct visualization into the esophagus and advanced to 75 cm at the lip.   Chest x-ray ordered to confirm ETT and OGT position.   Pt will be started on empiric zosyn for aspiration pneumonia  Should perform cuff leak prior to extubation in the future.     SIDDHARTH Rainey DO, MPH  Pulmonary Critical Care Medicine

## 2024-11-22 NOTE — ANESTHESIA PROCEDURE NOTES
Airway  Date/Time: 11/21/2024 7:36 PM  Urgency: emergent    Airway not difficult    General Information and Staff    Patient location during procedure: OR  Anesthesiologist: Yakov Mcgill MD  Performed: anesthesiologist   Performed by: Yakov Mcgill MD  Authorized by: Yakov Mcgill MD      Consent for Airway (if performed for an anesthetic, see related documentation for consents)  Consent: Written consent obtained.  Consent given by: patient      Indications and Patient Condition  Indications for airway management: anesthesia  Spontaneous Ventilation: absent  Sedation level: deep  Preoxygenated: yes  Patient position: sniffing  Mask difficulty assessment: 0 - not attempted  No planned trial extubation    Final Airway Details  Final airway type: endotracheal airway      Successful airway: ETT  Cuffed: yes   Successful intubation technique: direct laryngoscopy  Facilitating devices/methods: intubating stylet and cricoid pressure  Endotracheal tube insertion site: oral  Blade: Marko  Blade size: #3  ETT size (mm): 7.0    Cormack-Lehane Classification: grade I - full view of glottis  Placement verified by: capnometry   Measured from: teeth  ETT to teeth (cm): 22  Number of attempts at approach: 1  Number of other approaches attempted: 0

## 2024-11-22 NOTE — PLAN OF CARE
Rec'd from OR approx 0140. HTN shortly after arrival requiring Cleviprex to be started along with Nitroglycerin. Despite max on Precedex, pt woke up approx 0315 & was restless/anxious attempting to sit up/talk/pull lines/tubes. Propofol started to maintain sedation.     Minimal output via CT & Tanner. No clots noted. Good urine output.     CT output: 10cc  Tanner output: 90cc    Current gtts:  Nitroglycerin 60mcg  Cleviprex 5mg  Dobut 1mcg  Propofol 25mcg  Precedex 1.5mcg  Insulin 5units Algo 5    Problem: Diabetes/Glucose Control  Goal: Glucose maintained within prescribed range  Description: INTERVENTIONS:  - Monitor Blood Glucose as ordered  - Assess for signs and symptoms of hyperglycemia and hypoglycemia  - Administer ordered medications to maintain glucose within target range  - Assess barriers to adequate nutritional intake and initiate nutrition consult as needed  - Instruct patient on self management of diabetes  Outcome: Progressing     Problem: CARDIOVASCULAR - ADULT  Goal: Maintains optimal cardiac output and hemodynamic stability  Description: INTERVENTIONS:  - Monitor vital signs, rhythm, and trends  - Monitor for bleeding, hypotension and signs of decreased cardiac output  - Evaluate effectiveness of vasoactive medications to optimize hemodynamic stability  - Monitor arterial and/or venous puncture sites for bleeding and/or hematoma  - Assess quality of pulses, skin color and temperature  - Assess for signs of decreased coronary artery perfusion - ex. Angina  - Evaluate fluid balance, assess for edema, trend weights  Outcome: Progressing  Goal: Absence of cardiac arrhythmias or at baseline  Description: INTERVENTIONS:  - Continuous cardiac monitoring, monitor vital signs, obtain 12 lead EKG if indicated  - Evaluate effectiveness of antiarrhythmic and heart rate control medications as ordered  - Initiate emergency measures for life threatening arrhythmias  - Monitor electrolytes and administer  replacement therapy as ordered  Outcome: Progressing     Problem: RESPIRATORY - ADULT  Goal: Achieves optimal ventilation and oxygenation  Description: INTERVENTIONS:  - Assess for changes in respiratory status  - Assess for changes in mentation and behavior  - Position to facilitate oxygenation and minimize respiratory effort  - Oxygen supplementation based on oxygen saturation or ABGs  - Provide Smoking Cessation handout, if applicable  - Encourage broncho-pulmonary hygiene including cough, deep breathe, Incentive Spirometry  - Assess the need for suctioning and perform as needed  - Assess and instruct to report SOB or any respiratory difficulty  - Respiratory Therapy support as indicated  - Manage/alleviate anxiety  - Monitor for signs/symptoms of CO2 retention  Outcome: Progressing     Problem: METABOLIC/FLUID AND ELECTROLYTES - ADULT  Goal: Glucose maintained within prescribed range  Description: INTERVENTIONS:  - Monitor Blood Glucose as ordered  - Assess for signs and symptoms of hyperglycemia and hypoglycemia  - Administer ordered medications to maintain glucose within target range  - Assess barriers to adequate nutritional intake and initiate nutrition consult as needed  - Instruct patient on self management of diabetes  Outcome: Progressing  Goal: Electrolytes maintained within normal limits  Description: INTERVENTIONS:  - Monitor labs and rhythm and assess patient for signs and symptoms of electrolyte imbalances  - Administer electrolyte replacement as ordered  - Monitor response to electrolyte replacements, including rhythm and repeat lab results as appropriate  - Fluid restriction as ordered  - Instruct patient on fluid and nutrition restrictions as appropriate  Outcome: Progressing  Goal: Hemodynamic stability and optimal renal function maintained  Description: INTERVENTIONS:  - Monitor labs and assess for signs and symptoms of volume excess or deficit  - Monitor intake, output and patient weight  -  Monitor urine specific gravity, serum osmolarity and serum sodium as indicated or ordered  - Monitor response to interventions for patient's volume status, including labs, urine output, blood pressure (other measures as available)  - Encourage oral intake as appropriate  - Instruct patient on fluid and nutrition restrictions as appropriate  Outcome: Progressing     Problem: SKIN/TISSUE INTEGRITY - ADULT  Goal: Skin integrity remains intact  Description: INTERVENTIONS  - Assess and document risk factors for pressure ulcer development  - Assess and document skin integrity  - Monitor for areas of redness and/or skin breakdown  - Initiate interventions, skin care algorithm/standards of care as needed  Outcome: Progressing  Goal: Incision(s), wounds(s) or drain site(s) healing without S/S of infection  Description: INTERVENTIONS:  - Assess and document risk factors for pressure ulcer development  - Assess and document skin integrity  - Assess and document dressing/incision, wound bed, drain sites and surrounding tissue  - Implement wound care per orders  - Initiate isolation precautions as appropriate  - Initiate Pressure Ulcer prevention bundle as indicated  Outcome: Progressing     Problem: HEMATOLOGIC - ADULT  Goal: Maintains hematologic stability  Description: INTERVENTIONS  - Assess for signs and symptoms of bleeding or hemorrhage  - Monitor labs and vital signs for trends  - Administer supportive blood products/factors, fluids and medications as ordered and appropriate  - Administer supportive blood products/factors as ordered and appropriate  Outcome: Progressing  Goal: Free from bleeding injury  Description: (Example usage: patient with low platelets)  INTERVENTIONS:  - Avoid intramuscular injections, enemas and rectal medication administration  - Ensure safe mobilization of patient  - Hold pressure on venipuncture sites to achieve adequate hemostasis  - Assess for signs and symptoms of internal bleeding  -  Monitor lab trends  - Patient is to report abnormal signs of bleeding to staff  - Avoid use of toothpicks and dental floss  - Use electric shaver for shaving  - Use soft bristle tooth brush  - Limit straining and forceful nose blowing  Outcome: Progressing

## 2024-11-22 NOTE — ED PROVIDER NOTES
Patient Seen in: [facility] Emergency Department      History     Chief Complaint   Patient presents with    Chest Pain Angina     Stated Complaint: chest pain    Subjective:   HPI  59-year-old female with CKD, hypertension, high cholesterol, who presents for evaluation of chest pain and epigastric pain.  She describes a sharp pain which began about 30 minutes prior to calling the ambulance while she was sitting down.  She has associated dyspnea.  No blood thinners.          Objective:     Past Medical History:    Chronic kidney disease (CKD)    Esophageal reflux    High blood pressure    High cholesterol    HYPERTENSION    Hypertension    Unspecified essential hypertension              Past Surgical History:   Procedure Laterality Date    Colonoscopy N/A 2018    Procedure: COLONOSCOPY;  Surgeon: Magdy Webber MD;  Location: [facility] ENDOSCOPY    Endometrial ablation      child passed away for 5 mins          1    Other surgical history  11    cysto-Dr. Lacey -- pt denies                Social History     Socioeconomic History    Marital status: Single   Tobacco Use    Smoking status: Former     Current packs/day: 0.00     Average packs/day: 1 pack/day for 13.0 years (13.0 ttl pk-yrs)     Types: Cigarettes     Start date:      Quit date:      Years since quittin.9    Smokeless tobacco: Former   Vaping Use    Vaping status: Never Used   Substance and Sexual Activity    Alcohol use: Yes     Alcohol/week: 1.0 - 2.0 standard drink of alcohol     Types: 1 - 2 Cans of beer per week     Comment: every day    Drug use: No     Social Drivers of Health      [facility]                  Physical Exam     ED Triage Vitals   BP 24 1717 93/48   Pulse 24 1717 64   Resp 24 1717 18   Temp 24 1743 97.5 °F (36.4 °C)   Temp src 24 1743 Oral   SpO2 24 1717 99 %   O2 Device 24 1717 None (Room air)        Current Vitals:   Vital Signs  BP: 95/57  Pulse: 76  Resp: 25  Temp: 97.5 °F (36.4 °C)  Temp src: Oral  MAP (mmHg): 68    Oxygen Therapy  SpO2: 93 %  O2 Device: None (Room air)        Physical Exam  Vitals and nursing note reviewed.   Constitutional:       Appearance: She is well-developed.   HENT:      Head: Normocephalic and atraumatic.   Eyes:      Extraocular Movements: Extraocular movements intact.   Cardiovascular:      Rate and Rhythm: Normal rate and regular rhythm.      Heart sounds: Normal heart sounds.   Pulmonary:      Effort: Pulmonary effort is normal.      Breath sounds: Normal breath sounds.   Abdominal:      General: There is no distension.      Palpations: Abdomen is soft.      Tenderness: There is no abdominal tenderness.   Musculoskeletal:         General: Normal range of motion.      Cervical back: Normal range of motion.      Right lower leg: No edema.      Left lower leg: No edema.   Skin:     General: Skin is warm.   Neurological:      Mental Status: She is alert.      Comments: No focal deficits       Differential diagnose includes but is not limited to aortic dissection, PE, ACS/MI    ED Course     Labs Reviewed   CBC WITH DIFFERENTIAL WITH PLATELET - Abnormal; Notable for the following components:       Result Value    RDW-SD 48.6 (*)     RDW 15.9 (*)     All other components within normal limits   BASIC METABOLIC PANEL (8) - Abnormal; Notable for the following components:    Glucose 109 (*)     Sodium 146 (*)     Potassium 3.4 (*)     Creatinine 1.19 (*)     Calculated Osmolality 306 (*)     eGFR-Cr 52 (*)     All other components within normal limits   PRO BETA NATRIURETIC PEPTIDE - Abnormal; Notable for the following components:    Pro-Beta Natriuretic Peptide 134 (*)     All other components within normal limits   TROPONIN I HIGH SENSITIVITY - Normal   HEPATIC FUNCTION PANEL (7) - Normal   PTT, ACTIVATED - Normal   PROTHROMBIN TIME (PT) - Normal   RAPID SARS-COV-2 BY PCR - Normal    TYPE AND SCREEN    Narrative:     The following orders were created for panel order Type and screen.  Procedure                               Abnormality         Status                     ---------                               -----------         ------                     ABORH (Blood Type)[[id number]]                               Final result               Antibody Screen[[id number]]                                  Final result                 Please view results for these tests on the individual orders.   ABORH (BLOOD TYPE)   ANTIBODY SCREEN   ABORH CONFIRMATION   PREPARE PLATELETS   PREPARE FRESH FROZEN PLASMA   PREPARE RBC   RAINBOW DRAW LAVENDER   RAINBOW DRAW LIGHT GREEN   RAINBOW DRAW BLUE     EKG at 1718    Rate, intervals and axes as noted on EKG Report.  Rate: 51  Rhythm: Complete heart block  Reading: No STEMI    EKG at 1735    Rate, intervals and axes as noted on EKG Report.  Rate: 49  Rhythm: Complete heart block  Reading: No STEMI       ED Course as of 11/21/24 2021  ------------------------------------------------------------  Time: 11/21 1744  Comment: Improvement in heart rate after 1 mg IV atropine  ------------------------------------------------------------  Time: 11/21 1814  Comment: ELLE/w Dr Rafa Gregg- states he is not on call, and he will attempt to reach on call physician Dr Leone  ------------------------------------------------------------  Time: 11/21 1824  Comment: ELLE/harriet Gregg- will come in emergently and plan for operative repair tonight  ------------------------------------------------------------  Time: 11/21 1846  Comment: Dr Leone returning my page, will meet Dr Gregg in the OR       XR CHEST AP PORTABLE  (CPT=71045)    Result Date: 11/21/2024  CONCLUSION: Cardiac enlargement with secondary signs of slight/early fluid overload.    Dictated by (CST): Bo Ramachandran MD on 11/21/2024 at 6:25 PM     Finalized by (CST): Bo Ramachandran MD on 11/21/2024 at 6:25 PM          CTA  CHEST+CTA ABDOMEN DISSECT SET (CPT=71275/51828)    Result Date: 11/21/2024  CONCLUSION:  1. Type a aortic dissection beginning just above right renal artery and extending distally into the left common iliac artery and external iliac.  Findings were called to Dr. Echavarria at 5:52 p.m.    Dictated by (CST): Balaji Alan MD on 11/21/2024 at 5:45 PM     Finalized by (CST): Balaji Alan MD on 11/21/2024 at 5:56 PM                MDM            I spent a total of 50 minutes of critical care time in obtaining history, performing a physical exam, bedside monitoring of interventions, collecting and interpreting tests and discussion with consultants but not including time spent performing procedures.    Medical Decision Making  Upon arrival to the ED, patient is bradycardic and hypotensive.  EKG is concerning for complete heart block.  His vitals improved after 1 mg IV atropine.  I ordered stat CTA chest abdomen pelvis dissection protocol.    Labs including CBC, coags, troponin, chemistry unremarkable.    Per my independent interpretation of CTA chest there is concern for type A dissection.  I spoke with radiologist Dr. Alan regarding this result.  Attempted paging on-call cardiothoracic surgery Dr. Leone without call back after 30 minutes and reached out to Dr. Gregg who evaluated patient at bedside and plan for operative repair urgently.    Blood pressure and heart rate controlled with esmolol drip followed by nicardipine drip.    Amount and/or Complexity of Data Reviewed  Labs: ordered. Decision-making details documented in ED Course.  Radiology: ordered and independent interpretation performed. Decision-making details documented in ED Course.  ECG/medicine tests: ordered and independent interpretation performed. Decision-making details documented in ED Course.  Discussion of management or test interpretation with external provider(s): As above  Notified [facility] hospitalist for admission; [facility], and crit care for  consults    Risk  Drug therapy requiring intensive monitoring for toxicity.  Decision regarding hospitalization.  Emergency major surgery.        Disposition and Plan     Clinical Impression:  1. Dissection of ascending aorta (HCC)         Disposition:  Send to or/cath/gi  11/21/2024  6:25 pm    Follow-up:  No follow-up provider specified.        Medications Prescribed:  Current Discharge Medication List              Supplementary Documentation:

## 2024-11-22 NOTE — HISTORICAL OFFICE NOTE
Facility Logo [facility]  [address], [address] [place] [address]  [phone]      Alisha Wilde  Progress Note  Demographics:  Name: Alisha Wilde YOB: 1965  Age: 58, Female Medical Record No: [id number]  Visited Date/Time: 10/03/2024 03:00 PM    Chief Complaints  F/U:HTNHL  F/U:HTNHL  F/U:HTNHL  History of Present Illness  The patient returns to follow-up the above conditions and concerns.  She has no chest pain, dyspnea, orthopnea, PND, edema, palpitations, lightheadedness, or syncope.   Cardiac risk factors Never smoked  Past Medical History  1.Abnormal EKG  2.Benign essential HTN  3.Hypercholesteremia  4.Statin intolerance  5.DM (diabetes mellitus) Type 2  6.Increased BMI  7.Obstructive sleep apnea - TANYA  Family History  1. Father - Hypertension (HTN), primary  2. Mother - Hypertension (HTN), primary; Family history of heart disease  Social History  Smoking status Never smoked  Tobacco usage - No (Non-smoker (finding))  Review of systems  Constitutional No history of Weight Loss and Anorexia  Eyes No history of Blurry vision, Visual changes and Double vision  ENT No history of Bloody nose (epistaxis) and Gingival bleeding  Hem/Lymphatic No history of Easy bruising, Blood clots, Hx of blood transfusion, Anemia and Bleeding problems  Physical Examination  Constitutional 100% O2  Vitals Left Arm Sitting  / 84 mmHg, Pulse rate 89 bpm, Height in 5' 5\", BMI: 41.8, Weight in 251 lbs (or) 114 kgs and BSA : 2.34 cc/m²  General Appearance No Acute Distress and Well groomed  Head/Eyes/Ears/Nose/Mouth/Throat Sclera Clear and Mucous membranes Moist  Neck Normal carotid pulsations, No carotid bruits and No JVD  Respiratory Unlabored, Lungs clear with normal breath sounds and Equal bilaterally  Cardiovascular   Gait Normal gait  Upper Extremities No clubbing, No cyanosis and No edema  Skin Warm and dry  Neurologic / Psychiatric Alert and Oriented and  Non-focal  Speech Normal speech  EKG/Other abnormalities  CV EXAM:  No JVP  Carotid pulses normal, no carotid bruits  Radial pulses normal  RRR, normal S1/S2, no murmur, rub, or gallop  No lower extremity edema   Allergies  1.atorvastatin - Ingredient(Reaction:Myalgia, Severity:Severe)  2.pravastatin - Ingredient(Reaction:Myalgia, Severity:Severe)  3.rosuvastatin - Ingredient(Reaction:myalgia, Severity:Severe)  Impression  1.Abnormal EKG  2.Benign essential HTN  3.Hypercholesteremia  4.Statin intolerance  5.Increased BMI  6.Obstructive sleep apnea - TANYA  Assessment & Plan  Abnormal ECG  -Chest pain and dyspnea resolved  -Exercise nuclear stress test: normal  -Echocardiogram: normal  -Continue to monitor     Hypertension  -BP at goal  -Continue current medications    Hypercholesterolemia  -Cannot tolerate statins.  Had mylagias with atorvastatin, pravastatin, and now rosuvastatin  -Could not use the Praluent injector well.  -Repatha cost prohibitive.  -Currently, on Leqvio  -LDL < 100  -Semiannual blood work to monitor     TANYA-Starting CPAP soon  TANYA-Starting CPAP soon  -F/u with sleep specialist  Labs and Diagnostics ordered  1.EKG (electrocardiogram) (Today)  Future appointments  1.Referral Visit - Bridger Avendano ([email or web address]) : (Today)  Miscellaneous  1.Weight monitoring (regime/therapy)  2.Reviewed trans thoracic echocardiogram, myocardial perfusion imaging with the patient.  Patient instructions  -Fasting blood work in 6 months and 1 year  -Follow-up appointment in 1 year  Diagnostics Details  Trans Thoracic Echocardiogram 04/30/2024  1.The left ventricle is mildly enlarged. The left ventricular wall thickness is normal. Global left ventricular systolic function is normal. The left ventricular ejection fraction is 56%. No significant wall motion abnormalities noted. Left ventricular diastolic function is normal.    2.The right ventricle is normal in size. Right ventricular systolic function is  normal.    Exercise Myocardial Perfusion Imaging 04/23/2024  1.Stress EKG is normal.    2.Maximal exercise treadmill test (MPHR : 87 %).    3.The functional capacity is fair (6.6 METs).    4.No chest discomfort.    5.Rare PVCs.    1.This is a normal perfusion study, no perfusion defects noted.    2.The left ventricular cavity is noted to be normal on the stress study. The left ventricular ejection fraction was calculated to be 62% and left ventricular global function is normal.    3.This scan is suggestive of low risk for future cardiovascular events.    4.The study quality is average.    CPOE Orders carried out by: Hannah CAR  Care Providers: Adán Marks MD, Hannah CAR and Brandon ARBOLEDA  Electronically Authenticated by  Adán Marks MD  10/03/2024 03:50:17 PM  Disclaimer: Components of this note were documented using voice recognition system and are subject to errors not corrected at proofreading. Contact the author of this note for any clarifications.

## 2024-11-22 NOTE — ANESTHESIA PROCEDURE NOTES
Central Line    Date/Time: 11/21/2024 7:50 PM    Performed by: Yakov Mcgill MD  Authorized by: Yakov Mcgill MD    General Information and Staff    Procedure Start:  11/21/2024 7:50 PM  Procedure End:  11/21/2024 7:59 PM  Anesthesiologist:  Yakov Mcgill MD  Performed by:  Anesthesiologist  Patient Location:  OR  Indication: central venous access and CVP monitoring    Site Identification: real time ultrasound guided, surface landmarks and image stored and retrievable        Procedure Detail    Patient Position:  Trendelenburg  Site:  Internal jugular  Prep:  Alcohol  Catheter Size:  9 Fr  Catheter Length (cm):  20  Catheter Type:  Introducer  Number of Lumens:  Triple lumen  Oximetric Catheter?: Yes    Procedure Detail: target vein identified, needle advanced into vein and blood aspirated and guidewire advanced into vein    Seldinger Technique?: Yes    Intravenous Verification: verified by ultrasound and venous blood return    Post Insertion: all ports aspirated, all ports flushed easily, guidewire was removed intact, line was sutured in place and dressing was applied      Assessment    Events: patient tolerated procedure well with no complications      PA Catheter Placement    PA Catheter Placed?: Yes    PA Catheter Type:  Oximetric  PA Catheter Size:  8  Laterality:  Right  Site:  Internal jugular  Placement Confirmation: pressure tracing changes and verified by VIJAY    PA Catheter Depth (cm):  48  Events: patient tolerated procedure well with no complications      Additional Comments

## 2024-11-22 NOTE — ANESTHESIA PROCEDURE NOTES
Arterial Line    Date/Time: 11/21/2024 7:40 PM    Performed by: Yakov Mcgill MD  Authorized by: Yakov Mcgill MD    General Information and Staff    Procedure Start:  11/21/2024 7:40 PM  Procedure End:  11/21/2024 7:45 AM  Anesthesiologist:  Yakov Mcgill MD  Performed By:  Anesthesiologist  Patient Location:  OR  Indication: continuous blood pressure monitoring and blood sampling needed    Site Identification: surface landmarks    Preanesthetic Checklist: 2 patient identifiers, IV checked, risks and benefits discussed, monitors and equipment checked, pre-op evaluation, timeout performed, anesthesia consent and sterile technique used    Procedure Details    Catheter Size:  20 G  Catheter Length:  1 and 3/4 inch  Catheter Type:  Angiocath  Seldinger Technique?: Yes    Laterality:  Left  Site:  Radial artery  Site Prep: alcohol swabs    Line Secured:  Tape, Tegaderm and Wrist Brace    Assessment    Events: patient tolerated procedure well with no complications      Medications  11/21/2024 7:40 PM      Additional Comments

## 2024-11-22 NOTE — PROGRESS NOTES
[facility]  part of [facility]    Progress Note    Alisha Wilde Patient Status:  Inpatient    10/25/1965 MRN [id number]   Location [facility] 2W/SW Attending Lizeth Buck MD   Hosp Day # 0 PCP Bridger Avendano MD     Chief Complaint: type a aortic dissection    SUBJECTIVE:    Patient is intubated and sedation.  Unable to do ROS.      OBJECTIVE:  Vital signs in last 24 hours:  /65 (BP Location: Right arm)   Pulse 90   Temp 98.7 °F (37.1 °C) (Pulmonary Artery)   Resp 17   Ht 5' 5\" (1.651 m)   Wt 258 lb 2.5 oz (117.1 kg)   SpO2 96%   BMI 42.96 kg/m²     Intake/Output:    Intake/Output Summary (Last 24 hours) at 2024 1433  Last data filed at 2024 1200  Gross per 24 hour   Intake 5441.8 ml   Output 2527 ml   Net 2914.8 ml       Wt Readings from Last 3 Encounters:   24 258 lb 2.5 oz (117.1 kg)   10/24/24 254 lb (115.2 kg)   24 249 lb (112.9 kg)       Exam     Gen: intubated, sedated  Pulm: Lungs clear, normal respiratory effort  CV: Heart with regular rate and rhythm  Abd: Abdomen soft,   Neuro: sedated, unable to do  MSK: no joint swelling noted  Skin: Warm and dry  Psych: sedated, unable to check  Ext: no cyanosis    Data Review:     Labs:   Lab Results   Component Value Date    WBC 10.3 2024    HGB 10.6 2024    HCT 32.7 2024    .0 2024    CREATSERUM 1.60 2024    BUN 24 2024     2024    K 4.0 2024     2024    CO2 20.0 2024     2024    CA 8.8 2024    ALB 4.3 2024    ALKPHO 90 2024    BILT 0.4 2024    TP 6.9 2024    AST 27 2024    ALT 22 2024    PTT 28.2 2024    INR 1.25 2024    MG 2.3 2024       Imaging: Reviewed    XR CHEST AP PORTABLE  (CPT=71045)    Result Date: 2024  CONCLUSION:  1. NG tube terminates at the distal esophagus and needs to be advanced approximately 15 cm into the body of  the stomach.  ET tube in the trachea the level the clavicles.  No pneumothorax.  Linear bibasilar atelectasis.    Dictated by (CST): Adalberto Santos MD on 11/22/2024 at 1:02 PM     Finalized by (CST): Adalberto Santos MD on 11/22/2024 at 1:05 PM          XR CHEST AP PORTABLE  (CPT=71045)    Result Date: 11/22/2024  CONCLUSION: Post feeding tube placement with tip in the distal esophagus approximately 4 cm above the gastroesophageal junction.  Recommend advancement of the tube by approximately 10 cm to place it in the stomach.   Dictated by (CST): Jagjit Jin MD on 11/22/2024 at 11:53 AM     Finalized by (CST): Jagjit Jin MD on 11/22/2024 at 11:55 AM          XR CHEST AP PORTABLE  (CPT=71045)    Result Date: 11/22/2024  CONCLUSION:   Lines and tubes in place as described.  Trace pulmonary vascular redistribution without edema, unchanged.  Preliminary report was submitted by the [employer] teleradiologist and there are no significant discrepancies.  Dictated by (CST): Terrence Colorado MD on 11/22/2024 at 8:40 AM     Finalized by (CST): Terrence Colordao MD on 11/22/2024 at 8:42 AM          XR CHEST AP PORTABLE  (CPT=71045)    Result Date: 11/22/2024  CONCLUSION:   No radiopaque foreign body.  Right internal jugular Motley-Dev catheter at the level of the right ventricular outflow tract.  Upper mediastinal surgical clips.  Endotracheal tube seen 2.6 cm above the jennyfer.  Preliminary report was submitted by the [employer] teleradiologist and there are no significant discrepancies.    Dictated by (CST): Terrence Colorado MD on 11/22/2024 at 8:03 AM     Finalized by (CST): Terrence Colorado MD on 11/22/2024 at 8:05 AM          CTA CHEST+CTA ABDOMEN DISSECT SET (CPT=71275/94479)    Addendum Date: 11/22/2024    This report includes an Addendum and supersedes previous reports for this exam.    PROCEDURE: CT CHEST ABDOMEN DISSECT SET (CPT=71275/21596)  COMPARISON: None.  INDICATIONS: chest pain  TECHNIQUE:   CT images of the chest and abdomen were  obtained with intravenous contrast material.  Automated exposure control for dose reduction was used. Adjustment of the mA and/or kV was done based on the patient's size. Use of iterative reconstruction technique for dose reduction was used.  Dose information is transmitted to the ACR (American College of Radiology) NRDR (National Radiology Data Registry) which includes the Dose Index Registry.   Aorta/vascular: A type a aortic dissection beginning just above the right coronary artery and extending distally into the left common iliac and left external iliac resulting and significant narrowing of left external iliac at the edge of the field of view.  The false lumen is low right lateral along the ascending aorta and left lateral along the descending aorta.  The innominate, left common carotid and subclavian all, off of the true lumen and are patent.  Celiac, SMA, right renal artery are patent and arise from the true lumen.  The left renal artery straddles the true and false lumen and the dissection extends into the origin of the left renal artery and results in approximately 50 % narrowing of the origin.  Beyond the origin, the left renal artery is patent.  The inferior mesenteric artery arises from the false lumen.  In the upper abdomen, the false lumen occupies about 50 to 60 % of aortic diameter in the juxtarenal region, and in the infrarenal region the false lumen Flow a a a gutter I going to read this rule quick is a type a dissection or call urate back thanks by a low yes please high melonie cimetidine Alisha mccarty as a type a dissection it begins just above the right coronary goes down into the left iliac and the left external iliac locks like it does not have much blood in the thank you by occupies about 20 %. CHEST FINDINGS: CARDIAC: Mild coronary artery calcification.  Mildly dilated left ventricle.  MEDIASTINUM/HAMLET: No mass or adenopathy.  PULMONARY ARTERIES: Top-normal main pulmonary artery.  No pulmonary  embolus through segmental level. LUNGS/PLEURA: Subsegmental atelectasis and or scar in lower lungs.  No consolidation or pleural effusion.  Trachea and mainstem bronchi are patent. CHEST WALL: No mass or axillary adenopathy.  OTHER: Negative.  ABDOMEN FINDINGS: Evaluation of solid organs is limited to the arterial phase of contrast-enhancement. LIVER: Mild hepatic steatosis. SPLEEN: Normal.  PANCREAS: Normal.  BILIARY: No evidence for cholecystitis or biliary dilatation. ADRENALS: Normal.    KIDNEYS: Bilateral simple renal cysts.  A 2 mm nonobstructing right lower pole renal calculus. LYMPHADENOPATHY: None. GI/MESENTERY: Small to moderate hiatal hernia.  Colonic diverticulosis.  No pneumatosis, portal venous gas or other findings of ischemia.  No visible mass, obstruction, or bowel wall thickening.  ABDOMINAL WALL: Fat containing umbilical hernia.  ASCITES:                           None OTHER: Negative.  BONES: No suspect bone lesion or fracture.  CONCLUSION:  1. Type a aortic dissection beginning just above right renal (correction:  Right coronary)  artery and extending distally into the left common iliac artery and external iliac.  Findings were called to Dr. Echavarria at 5:52 p.m.    Dictated by (CST): Balaji Alan MD on 11/21/2024 at 5:45 PM     Finalized by (CST): Balaji Alan MD on 11/21/2024 at 5:56 PM     ADDENDUM:  Correction to the conclusion: Type a aortic dissection beginning just above right coronary artery and extending distally into the left common iliac and external iliac .   Dictated by (CST): Balaji Alan MD on 11/22/2024 at 0:16 AM     Finalized by (CST): Balaji Alan MD on 11/22/2024 at 0:17 AM             Result Date: 11/22/2024  CONCLUSION:  1. Type a aortic dissection beginning just above right renal artery and extending distally into the left common iliac artery and external iliac.  Findings were called to Dr. Echavarria at 5:52 p.m.    Dictated by (CST): Balaji Alan MD on 11/21/2024 at  5:45 PM     Finalized by (CST): Balaji Alan MD on 11/21/2024 at 5:56 PM          XR CHEST AP PORTABLE  (CPT=71045)    Result Date: 11/21/2024  CONCLUSION: Cardiac enlargement with secondary signs of slight/early fluid overload.    Dictated by (CST): Bo Ramachandran MD on 11/21/2024 at 6:25 PM     Finalized by (CST): Bo Ramachandran MD on 11/21/2024 at 6:25 PM           Meds:   Current Facility-Administered Medications   Medication Dose Route Frequency    midazolam (Versed) 2 MG/2ML injection        acetaminophen (Ofirmev) 10 mg/mL infusion premix 1,000 mg  1,000 mg Intravenous Q8H    melatonin tab 3 mg  3 mg Oral Nightly PRN    sennosides (Senokot) tab 8.6 mg  8.6 mg Oral BID    docusate sodium (Colace) cap 100 mg  100 mg Oral BID    polyethylene glycol (PEG 3350) (Miralax) 17 g oral packet 17 g  17 g Oral Daily PRN    bisacodyl (Dulcolax) 10 MG rectal suppository 10 mg  10 mg Rectal Daily PRN    metoclopramide (Reglan) 5 mg/mL injection 10 mg  10 mg Intravenous Q6H    ondansetron (Zofran) 4 MG/2ML injection 4 mg  4 mg Intravenous Q6H PRN    famotidine (Pepcid) tab 20 mg  20 mg Oral BID    Or    famotidine (Pepcid) 20 mg/2mL injection 20 mg  20 mg Intravenous BID    dexmedeTOMIDine in sodium chloride 0.9% (Precedex) 400 mcg/100mL infusion premix  0.2-1.5 mcg/kg/hr (Dosing Weight) Intravenous Continuous    DOBUTamine in dextrose 5% (Dobutrex) 500 mg/250mL infusion premix  2.5-20 mcg/kg/min (Dosing Weight) Intravenous Continuous PRN    nitroGLYCERIN in dextrose 5% 50 mg/250mL infusion premix  5-300 mcg/min Intravenous Continuous PRN    norepinephrine (Levophed) 4 mg/250mL infusion premix  0.5-30 mcg/min Intravenous Continuous PRN    metoprolol tartrate (Lopressor) tab 25 mg  25 mg Oral 2x Daily(Beta Blocker)    clevidipine (Cleviprex) 25 MG/50ML IV infusion  1-6 mg/hr Intravenous Continuous    potassium chloride 20 mEq/100mL IVPB premix 20 mEq  20 mEq Intravenous PRN    Or    potassium chloride 40 mEq/100mL IVPB  premix (central line) 40 mEq  40 mEq Intravenous PRN    magnesium sulfate in dextrose 5% 1 g/100mL infusion premix 1 g  1 g Intravenous PRN    magnesium sulfate in sterile water for injection 2 g/50mL IVPB premix 2 g  2 g Intravenous PRN    mupirocin (Bactroban) 2% nasal ointment 1 Application  1 Application Nasal BID    chlorhexidine gluconate (Peridex) 0.12 % oral solution 15 mL  15 mL Mouth/Throat BID    [START ON 11/23/2024] multivitamin (Tab-A-Treasure/Beta Carotene) tab 1 tablet  1 tablet Oral Daily    [START ON 11/23/2024] ascorbic acid (Vitamin C) tab 500 mg  500 mg Oral TID    dextrose 5%-sodium chloride 0.45% infusion   mL/hr Intravenous Continuous    morphINE PF 2 MG/ML injection 2 mg  2 mg Intravenous Q2H PRN    Or    morphINE PF 4 MG/ML injection 4 mg  4 mg Intravenous Q2H PRN    albumin human (Albumin) 5% injection 12.5 g  12.5 g Intravenous Once PRN    ceFAZolin (Ancef) 2g in 10mL IV syringe premix  2 g Intravenous Q8H    propofol (Diprivan) 10 mg/mL infusion premix  5-50 mcg/kg/min (Dosing Weight) Intravenous Continuous    fentaNYL (Sublimaze) 50 mcg/mL injection 25 mcg  25 mcg Intravenous Q3H PRN    piperacillin-tazobactam (Zosyn) 3.375 g in dextrose 5% 100 mL IVPB-ADDV  3.375 g Intravenous Q8H    norepinephrine (Levophed) 4 mg/250mL infusion premix  0.5-30 mcg/min Intravenous Continuous    insulin regular human (Novolin R, Humulin R) 100 Units in sodium chloride 0.9% 100 mL standard infusion (100 mL)  1-40 Units/hr Intravenous Continuous         Assessment  Patient Active Problem List   Diagnosis    Hypercholesteremia    Alcoholism (HCC)    Essential hypertension    Vitamin D deficiency    Adjustment disorder with mixed anxiety and depressed mood    Controlled type 2 diabetes mellitus without complication, without long-term current use of insulin (HCC)    Obesity (BMI 30-39.9)    Neck mass    Polyp of colon    Flat feet, bilateral    Hypokalemia    Myalgia due to statin    Age-related nuclear  cataract of both eyes    Positive for microalbuminuria    Dissection of ascending aorta (HCC)       Plan:     Acute type A aortic dissection  - went from RCA to left common and external iliac arteries  - s/p emergent repair by Dr Gregg  - will remain intubated for today - pulm following  - repeat cxr in am  - wound care per surgery  - echo pending    HTN  - cleviprex/nitroglycerin - wean able    Third degree heart block  - improved with atropine     Hx of etoh abuxe  - monitor    Morbid obesity with likely TANYA  - BMI 42  - fu as outpt    CKD stage 2-3  - monitor closely    Dispo: pending clinical course    Global A/P  - reviewed labs and vitals from today  - reviewed notes of the day  - cbc, bmp, mag ordered for tomorrow  - discussed need to stay in the hospital today due to above  - discussed with patient/RN and care team    MDM: High complexity- acute illness posing a threat to life. IV meds requiring close inpatient monitoring. I personally spent time on chart/note review, review of labs/imaging, discussion with patient, physical exam, discussion with staff, writing progress note, and discussion of plan of care.      Lizeth Buck MD  11/22/2024  2:33 PM    Supplementary Documentation:   DVT Mechanical Prophylaxis: MYNOR hose, LEFTYs,    DVT Pharmacologic Prophylaxis   Medication   None                Code Status: Full Code  Webb: Webb catheter in place  Webb Duration (in days): 2  Central line: central venous catheter in place  BRANDON:

## 2024-11-22 NOTE — PROGRESS NOTES
[facility]  part of [facility]    Progress Note    Alisha Wilde Patient Status:  Inpatient    10/25/1965 MRN [id number]   Location [facility] 2W/SW Attending Lizeth Buck MD   Hosp Day # 0 PCP Bridger Avendano MD     Subjective:  Intubated/sedated.  Hemodynamically stable on Cleviprex/nitro 60.  Sister/Brother currently visiting at bedside.     Objective:  BP 94/57 (BP Location: Right arm)   Pulse 93   Temp 99 °F (37.2 °C) (Pulmonary Artery)   Resp 15   Ht 5' 5\" (1.651 m)   Wt 258 lb 2.5 oz (117.1 kg)   SpO2 94%   BMI 42.96 kg/m²     Temp (24hrs), Av.3 °F (36.3 °C), Min:95.3 °F (35.2 °C), Max:99 °F (37.2 °C)  Telemetry-NSR with PVC    Intake/Output:    Intake/Output Summary (Last 24 hours) at 2024 1000  Last data filed at 2024 0900  Gross per 24 hour   Intake 5441.8 ml   Output 2390 ml   Net 3051.8 ml       Wt Readings from Last 3 Encounters:   24 258 lb 2.5 oz (117.1 kg)   10/24/24 254 lb (115.2 kg)   24 249 lb (112.9 kg)       Allergies:  Allergies[1]    Labs:  Lab Results   Component Value Date    WBC 14.2 2024    HGB 9.3 2024    HCT 28.0 2024    .0 2024    CREATSERUM 1.25 2024    BUN 20 2024     2024    K 4.0 2024     2024    CO2 22.0 2024     2024    CA 8.7 2024    ALB 4.3 2024    ALKPHO 90 2024    BILT 0.4 2024    TP 6.9 2024    AST 27 2024    ALT 22 2024    PTT 28.2 2024    INR 1.25 2024    MG 2.3 2024    TROPHS 12 2024       Physical Exam:  Blood pressure 94/57, pulse 93, temperature 99 °F (37.2 °C), temperature source Pulmonary Artery, resp. rate 15, height 5' 5\" (1.651 m), weight 258 lb 2.5 oz (117.1 kg), SpO2 94%, not currently breastfeeding.  General: Intubated/sedated  Neck: Unable to assess due to body habitus/intubation/RIJ cordis/Nathrop  Lungs: Slightly coarse  anterior/diminished lateral  CT - 100cc/6hrs, no air leak noted  Heart: RRR, S1, S2  Abdomen: Soft, NT/ND, BS hypoactive  Extremities: Warm, dry, trace generalized edema  Pulses: 2+ bilat DP  Skin: sternotomy stable, Prevena dressing intact  Neurological: Sedated/intubated, positive gag with suctioning/spontaneously moves all extremities with sedation lightened    Assessment/Plan:  S/p emergent aortic dissection repair-POD 0  Hemodynamically stable on Cleviprex/nitro-wean as tolerated to maintain SBP less than 120, MAP 65-70.  EKG reviewed  Leave intubated on full vent support today, may wean vent per pulmonary critical care tomorrow if remains hemodynamically stable to extubate.  CXR reviewed.  Repeat CXR in a.m.  Placed OGT-suction LIS, okay to give meds  Pain meds as needed, fentanyl IV/MSO4 as needed  DVT prevention - SCDs/Lovenox  Expected postop anemia - mild/stable, consider starting Iron if Hgb <10, repeat CBC this a.m.  Expected postop volume overload - monitor daily weights, I/Os, lasix prn, repeat BMP pending  Insulin coverage per protocol -no history DM preop, anticipate transitioning off insulin drip once extubated.  EtOH use per family-CIWA protocol    DC planning-patient lives alone, has supportive family.  Anticipate DC home once medically stable.  Appreciate SW/CM to assist with DC planning    Plan of care discussed with pt, bedside RN, family, and CV Surgeon: Dr. Marysol Randle, APRN  11/22/2024  10:00 AM         [1]   Allergies  Allergen Reactions    Atorvastatin MYALGIA    Pravastatin MYALGIA    Rosuvastatin MYALGIA    Seasonal Runny nose

## 2024-11-23 ENCOUNTER — APPOINTMENT (OUTPATIENT)
Dept: CV DIAGNOSTICS | Facility: HOSPITAL | Age: 59
DRG: 003 | End: 2024-11-23
Attending: INTERNAL MEDICINE
Payer: COMMERCIAL

## 2024-11-23 ENCOUNTER — APPOINTMENT (OUTPATIENT)
Dept: GENERAL RADIOLOGY | Facility: HOSPITAL | Age: 59
End: 2024-11-23
Attending: NURSE PRACTITIONER
Payer: COMMERCIAL

## 2024-11-23 ENCOUNTER — APPOINTMENT (OUTPATIENT)
Dept: ULTRASOUND IMAGING | Facility: HOSPITAL | Age: 59
DRG: 003 | End: 2024-11-23
Attending: INTERNAL MEDICINE
Payer: COMMERCIAL

## 2024-11-23 ENCOUNTER — APPOINTMENT (OUTPATIENT)
Dept: ULTRASOUND IMAGING | Facility: HOSPITAL | Age: 59
End: 2024-11-23
Attending: INTERNAL MEDICINE
Payer: COMMERCIAL

## 2024-11-23 ENCOUNTER — APPOINTMENT (OUTPATIENT)
Dept: GENERAL RADIOLOGY | Facility: HOSPITAL | Age: 59
DRG: 003 | End: 2024-11-23
Attending: NURSE PRACTITIONER
Payer: COMMERCIAL

## 2024-11-23 ENCOUNTER — APPOINTMENT (OUTPATIENT)
Dept: CV DIAGNOSTICS | Facility: HOSPITAL | Age: 59
End: 2024-11-23
Attending: INTERNAL MEDICINE
Payer: COMMERCIAL

## 2024-11-23 LAB
ALBUMIN SERPL-MCNC: 3.4 G/DL (ref 3.2–4.8)
ALBUMIN/GLOB SERPL: 1.8 {RATIO} (ref 1–2)
ALP LIVER SERPL-CCNC: 49 U/L
ALT SERPL-CCNC: 9 U/L
ANION GAP SERPL CALC-SCNC: 11 MMOL/L (ref 0–18)
AST SERPL-CCNC: 33 U/L (ref ?–34)
BASOPHILS # BLD AUTO: 0.01 X10(3) UL (ref 0–0.2)
BASOPHILS NFR BLD AUTO: 0.1 %
BILIRUB SERPL-MCNC: 0.3 MG/DL (ref 0.2–1.1)
BILIRUB UR QL: NEGATIVE
BLOOD TYPE BARCODE: 5100
BUN BLD-MCNC: 32 MG/DL (ref 9–23)
BUN/CREAT SERPL: 14 (ref 10–20)
CALCIUM BLD-MCNC: 8.4 MG/DL (ref 8.7–10.4)
CHLORIDE SERPL-SCNC: 112 MMOL/L (ref 98–112)
CLARITY UR: CLEAR
CO2 SERPL-SCNC: 21 MMOL/L (ref 21–32)
CREAT BLD-MCNC: 2.28 MG/DL
CREAT UR-SCNC: 146.8 MG/DL
DEPRECATED RDW RBC AUTO: 51 FL (ref 35.1–46.3)
EGFRCR SERPLBLD CKD-EPI 2021: 24 ML/MIN/1.73M2 (ref 60–?)
EOSINOPHIL # BLD AUTO: 0 X10(3) UL (ref 0–0.7)
EOSINOPHIL NFR BLD AUTO: 0 %
ERYTHROCYTE [DISTWIDTH] IN BLOOD BY AUTOMATED COUNT: 16.4 % (ref 11–15)
GLOBULIN PLAS-MCNC: 1.9 G/DL (ref 2–3.5)
GLUCOSE BLD-MCNC: 142 MG/DL (ref 70–99)
GLUCOSE BLDC GLUCOMTR-MCNC: 112 MG/DL (ref 70–99)
GLUCOSE BLDC GLUCOMTR-MCNC: 114 MG/DL (ref 70–99)
GLUCOSE BLDC GLUCOMTR-MCNC: 118 MG/DL (ref 70–99)
GLUCOSE BLDC GLUCOMTR-MCNC: 122 MG/DL (ref 70–99)
GLUCOSE BLDC GLUCOMTR-MCNC: 125 MG/DL (ref 70–99)
GLUCOSE BLDC GLUCOMTR-MCNC: 126 MG/DL (ref 70–99)
GLUCOSE BLDC GLUCOMTR-MCNC: 128 MG/DL (ref 70–99)
GLUCOSE BLDC GLUCOMTR-MCNC: 130 MG/DL (ref 70–99)
GLUCOSE BLDC GLUCOMTR-MCNC: 130 MG/DL (ref 70–99)
GLUCOSE BLDC GLUCOMTR-MCNC: 134 MG/DL (ref 70–99)
GLUCOSE BLDC GLUCOMTR-MCNC: 137 MG/DL (ref 70–99)
GLUCOSE BLDC GLUCOMTR-MCNC: 138 MG/DL (ref 70–99)
GLUCOSE BLDC GLUCOMTR-MCNC: 140 MG/DL (ref 70–99)
GLUCOSE BLDC GLUCOMTR-MCNC: 141 MG/DL (ref 70–99)
GLUCOSE BLDC GLUCOMTR-MCNC: 141 MG/DL (ref 70–99)
GLUCOSE BLDC GLUCOMTR-MCNC: 142 MG/DL (ref 70–99)
GLUCOSE BLDC GLUCOMTR-MCNC: 153 MG/DL (ref 70–99)
GLUCOSE UR-MCNC: NORMAL MG/DL
HCT VFR BLD AUTO: 29.9 %
HGB BLD-MCNC: 9.6 G/DL
HGB UR QL STRIP.AUTO: NEGATIVE
IMM GRANULOCYTES # BLD AUTO: 0.09 X10(3) UL (ref 0–1)
IMM GRANULOCYTES NFR BLD: 0.7 %
KETONES UR-MCNC: NEGATIVE MG/DL
LEUKOCYTE ESTERASE UR QL STRIP.AUTO: NEGATIVE
LYMPHOCYTES # BLD AUTO: 0.68 X10(3) UL (ref 1–4)
LYMPHOCYTES NFR BLD AUTO: 5.1 %
MAGNESIUM SERPL-MCNC: 2.2 MG/DL (ref 1.6–2.6)
MCH RBC QN AUTO: 27 PG (ref 26–34)
MCHC RBC AUTO-ENTMCNC: 32.1 G/DL (ref 31–37)
MCV RBC AUTO: 84 FL
MICROALBUMIN UR-MCNC: 0.5 MG/DL
MICROALBUMIN/CREAT 24H UR-RTO: 3.4 UG/MG (ref ?–30)
MONOCYTES # BLD AUTO: 0.72 X10(3) UL (ref 0.1–1)
MONOCYTES NFR BLD AUTO: 5.4 %
NEUTROPHILS # BLD AUTO: 11.86 X10 (3) UL (ref 1.5–7.7)
NEUTROPHILS # BLD AUTO: 11.86 X10(3) UL (ref 1.5–7.7)
NEUTROPHILS NFR BLD AUTO: 88.7 %
NITRITE UR QL STRIP.AUTO: NEGATIVE
OSMOLALITY SERPL CALC.SUM OF ELEC: 307 MOSM/KG (ref 275–295)
PH UR: 5.5 [PH] (ref 5–8)
PLATELET # BLD AUTO: 127 10(3)UL (ref 150–450)
PLATELETS.RETICULATED NFR BLD AUTO: 6.3 % (ref 0–7)
POTASSIUM SERPL-SCNC: 4.2 MMOL/L (ref 3.5–5.1)
PROT SERPL-MCNC: 5.3 G/DL (ref 5.7–8.2)
RBC # BLD AUTO: 3.56 X10(6)UL
SODIUM SERPL-SCNC: 144 MMOL/L (ref 136–145)
SP GR UR STRIP: >1.03 (ref 1–1.03)
UNIT VOLUME: 107 ML
UNIT VOLUME: 204 ML
UROBILINOGEN UR STRIP-ACNC: NORMAL
WBC # BLD AUTO: 13.4 X10(3) UL (ref 4–11)

## 2024-11-23 PROCEDURE — 93306 TTE W/DOPPLER COMPLETE: CPT | Performed by: INTERNAL MEDICINE

## 2024-11-23 PROCEDURE — 71045 X-RAY EXAM CHEST 1 VIEW: CPT | Performed by: NURSE PRACTITIONER

## 2024-11-23 PROCEDURE — 99291 CRITICAL CARE FIRST HOUR: CPT | Performed by: INTERNAL MEDICINE

## 2024-11-23 PROCEDURE — 99223 1ST HOSP IP/OBS HIGH 75: CPT | Performed by: INTERNAL MEDICINE

## 2024-11-23 PROCEDURE — 99233 SBSQ HOSP IP/OBS HIGH 50: CPT | Performed by: HOSPITALIST

## 2024-11-23 PROCEDURE — 76770 US EXAM ABDO BACK WALL COMP: CPT | Performed by: INTERNAL MEDICINE

## 2024-11-23 RX ORDER — ALBUMIN HUMAN 50 G/1000ML
12.5 SOLUTION INTRAVENOUS ONCE
Status: COMPLETED | OUTPATIENT
Start: 2024-11-23 | End: 2024-11-23

## 2024-11-23 RX ORDER — HEPARIN SODIUM 5000 [USP'U]/ML
5000 INJECTION, SOLUTION INTRAVENOUS; SUBCUTANEOUS EVERY 8 HOURS SCHEDULED
Status: DISCONTINUED | OUTPATIENT
Start: 2024-11-23 | End: 2024-12-23

## 2024-11-23 RX ORDER — ACETAMINOPHEN 10 MG/ML
1000 INJECTION, SOLUTION INTRAVENOUS EVERY 8 HOURS
Status: COMPLETED | OUTPATIENT
Start: 2024-11-23 | End: 2024-11-24

## 2024-11-23 NOTE — PLAN OF CARE
Pt remains intubated and sedated. Bilateral wrist restraints on. On precedex drip, propofol drip . Nitroglycerin drip, intermittent cleveprex drip, Levophed and insulin drip .OG to LIS . No BM yet. CT/Tanner drain to suction.No airleak and crepitus noted. Will continue to monitor.    Problem: CARDIOVASCULAR - ADULT  Goal: Maintains optimal cardiac output and hemodynamic stability  Description: INTERVENTIONS:  - Monitor vital signs, rhythm, and trends  - Monitor for bleeding, hypotension and signs of decreased cardiac output  - Evaluate effectiveness of vasoactive medications to optimize hemodynamic stability  - Monitor arterial and/or venous puncture sites for bleeding and/or hematoma  - Assess quality of pulses, skin color and temperature  - Assess for signs of decreased coronary artery perfusion - ex. Angina  - Evaluate fluid balance, assess for edema, trend weights  Outcome: Progressing  Goal: Absence of cardiac arrhythmias or at baseline  Description: INTERVENTIONS:  - Continuous cardiac monitoring, monitor vital signs, obtain 12 lead EKG if indicated  - Evaluate effectiveness of antiarrhythmic and heart rate control medications as ordered  - Initiate emergency measures for life threatening arrhythmias  - Monitor electrolytes and administer replacement therapy as ordered  Outcome: Progressing     Problem: RESPIRATORY - ADULT  Goal: Achieves optimal ventilation and oxygenation  Description: INTERVENTIONS:  - Assess for changes in respiratory status  - Assess for changes in mentation and behavior  - Position to facilitate oxygenation and minimize respiratory effort  - Oxygen supplementation based on oxygen saturation or ABGs  - Provide Smoking Cessation handout, if applicable  - Encourage broncho-pulmonary hygiene including cough, deep breathe, Incentive Spirometry  - Assess the need for suctioning and perform as needed  - Assess and instruct to report SOB or any respiratory difficulty  - Respiratory Therapy  support as indicated  - Manage/alleviate anxiety  - Monitor for signs/symptoms of CO2 retention  Outcome: Progressing     Problem: GASTROINTESTINAL - ADULT  Goal: Minimal or absence of nausea and vomiting  Description: INTERVENTIONS:  - Maintain adequate hydration with IV or PO as ordered and tolerated  - Nasogastric tube to low intermittent suction as ordered  - Evaluate effectiveness of ordered antiemetic medications  - Provide nonpharmacologic comfort measures as appropriate  - Advance diet as tolerated, if ordered  - Obtain nutritional consult as needed  - Evaluate fluid balance  Outcome: Progressing  Goal: Maintains or returns to baseline bowel function  Description: INTERVENTIONS:  - Assess bowel function  - Maintain adequate hydration with IV or PO as ordered and tolerated  - Evaluate effectiveness of GI medications  - Encourage mobilization and activity  - Obtain nutritional consult as needed  - Establish a toileting routine/schedule  - Consider collaborating with pharmacy to review patient's medication profile  Outcome: Progressing     Problem: METABOLIC/FLUID AND ELECTROLYTES - ADULT  Goal: Glucose maintained within prescribed range  Description: INTERVENTIONS:  - Monitor Blood Glucose as ordered  - Assess for signs and symptoms of hyperglycemia and hypoglycemia  - Administer ordered medications to maintain glucose within target range  - Assess barriers to adequate nutritional intake and initiate nutrition consult as needed  - Instruct patient on self management of diabetes  Outcome: Progressing  Goal: Electrolytes maintained within normal limits  Description: INTERVENTIONS:  - Monitor labs and rhythm and assess patient for signs and symptoms of electrolyte imbalances  - Administer electrolyte replacement as ordered  - Monitor response to electrolyte replacements, including rhythm and repeat lab results as appropriate  - Fluid restriction as ordered  - Instruct patient on fluid and nutrition restrictions  as appropriate  Outcome: Progressing  Goal: Hemodynamic stability and optimal renal function maintained  Description: INTERVENTIONS:  - Monitor labs and assess for signs and symptoms of volume excess or deficit  - Monitor intake, output and patient weight  - Monitor urine specific gravity, serum osmolarity and serum sodium as indicated or ordered  - Monitor response to interventions for patient's volume status, including labs, urine output, blood pressure (other measures as available)  - Encourage oral intake as appropriate  - Instruct patient on fluid and nutrition restrictions as appropriate  Outcome: Progressing     Problem: SKIN/TISSUE INTEGRITY - ADULT  Goal: Skin integrity remains intact  Description: INTERVENTIONS  - Assess and document risk factors for pressure ulcer development  - Assess and document skin integrity  - Monitor for areas of redness and/or skin breakdown  - Initiate interventions, skin care algorithm/standards of care as needed  Outcome: Progressing  Goal: Incision(s), wounds(s) or drain site(s) healing without S/S of infection  Description: INTERVENTIONS:  - Assess and document risk factors for pressure ulcer development  - Assess and document skin integrity  - Assess and document dressing/incision, wound bed, drain sites and surrounding tissue  - Implement wound care per orders  - Initiate isolation precautions as appropriate  - Initiate Pressure Ulcer prevention bundle as indicated  Outcome: Progressing  Goal: Oral mucous membranes remain intact  Description: INTERVENTIONS  - Assess oral mucosa and hygiene practices  - Implement preventative oral hygiene regimen  - Implement oral medicated treatments as ordered  Outcome: Progressing     Problem: MUSCULOSKELETAL - ADULT  Goal: Return mobility to safest level of function  Description: INTERVENTIONS:  - Assess patient stability and activity tolerance for standing, transferring and ambulating w/ or w/o assistive devices  - Assist with transfers  and ambulation using safe patient handling equipment as needed  - Ensure adequate protection for wounds/incisions during mobilization  - Obtain PT/OT consults as needed  - Advance activity as appropriate  - Communicate ordered activity level and limitations with patient/family  Outcome: Progressing  Goal: Maintain proper alignment of affected body part  Description: INTERVENTIONS:  - Support and protect limb and body alignment per provider's orders  - Instruct and reinforce with patient and family use of appropriate assistive device and precautions (e.g. spinal or hip dislocation precautions)  Outcome: Progressing     Problem: NEUROLOGICAL - ADULT  Goal: Achieves stable or improved neurological status  Description: INTERVENTIONS  - Assess for and report changes in neurological status  - Initiate measures to prevent increased intracranial pressure  - Maintain blood pressure and fluid volume within ordered parameters to optimize cerebral perfusion and minimize risk of hemorrhage  - Monitor temperature, glucose, and sodium. Initiate appropriate interventions as ordered  Outcome: Progressing     Problem: Safety Risk - Non-Violent Restraints  Goal: Patient will remain free from self-harm  Description: INTERVENTIONS:  - Apply the least restrictive restraint to prevent harm  - Notify patient and family of reasons restraints applied  - Assess for any contributing factors to confusion (electrolyte disturbances, delirium, medications)  - Discontinue any unnecessary medical devices as soon as possible  - Assess the patient's physical comfort, circulation, skin condition, hydration, nutrition and elimination needs   - Reorient and redirection as needed  - Assess for the need to continue restraints  Outcome: Progressing

## 2024-11-23 NOTE — PROGRESS NOTES
Pulmonary/ICU/Critical Care Progress Note        Reason for Consultation: post op care  Referring Physician: Dr. Gregg    Seen this am.   SUBJECTIVE:  ETT and OGT came out yesterday and had to be emergently reintubated but had significant aspiration. Started on zosyn empirically  Remains on intermittent levo, dobutamine, and ntg  Opens eyes and follows commands on sedation. Appears comfortable      ALLERGIES:  Allergies[1]      MEDS:  Home Medications:  Medications Taking[2]    Scheduled Medication:   heparin  5,000 Units Subcutaneous Q8H GABRIEL    acetaminophen  1,000 mg Intravenous Q8H    sennosides  8.6 mg Oral BID    docusate sodium  100 mg Oral BID    metoprolol tartrate  25 mg Oral 2x Daily(Beta Blocker)    mupirocin  1 Application Nasal BID    chlorhexidine gluconate  15 mL Mouth/Throat BID    piperacillin-tazobactam  3.375 g Intravenous Q8H    metoclopramide  5 mg Intravenous Q6H    famotidine  20 mg Oral Daily    Or    famotidine  20 mg Intravenous Daily    aspirin  81 mg Oral Daily     Continuous Infusing Medication:   dexmedetomidine 0.6 mcg/kg/hr (11/23/24 1100)    DOBUTamine 1 mcg/kg/min (11/23/24 1100)    nitroGLYCERIN in dextrose 5% 40 mcg/min (11/23/24 1100)    norepinephrine Stopped (11/23/24 0910)    clevidipine 1 mg/hr (11/23/24 1100)    dextrose 5%-sodium chloride 0.45% 40 mL/hr (11/23/24 0800)    propofol 30 mcg/kg/min (11/23/24 1100)    norepinephrine      insulin regular 5 Units/hr (11/23/24 1100)     PRN Medications:    Perflutren Lipid Microsphere    melatonin    polyethylene glycol (PEG 3350)    bisacodyl    ondansetron    DOBUTamine    nitroGLYCERIN in dextrose 5%    norepinephrine    potassium chloride **OR** potassium chloride    magnesium sulfate in dextrose 5%    magnesium sulfate in sterile water for injection    morphINE **OR** morphINE    albumin human    fentaNYL       PHYSICAL EXAM:  /72 (BP Location: Right arm)   Pulse 88   Temp 97.8 °F (36.6 °C) (Pulmonary Artery)   Resp  18   Ht 5' 5\" (1.651 m)   Wt 258 lb 2.5 oz (117.1 kg)   SpO2 96%   BMI 42.96 kg/m²   Vent Mode: VC/AC  FiO2 (%):  [60 %-100 %] 80 %  S RR:  [16] 16  S VT:  [550 mL] 550 mL  PEEP/CPAP (cm H2O):  [8 cm H20-10 cm H20] 8 cm H20  MAP (cm H2O):  [11-16] 11    CONSTITUTIONAL: intubated, sedated but opens eyes and follows commands  HEENT: atraumatic normocephalic  MOUTH: ETT, OGT  NECK/THROAT: no JVD. Trachea midline. No obvious thyromegaly. +right swan  LUNG: vented upper clear BS b/l no wheezing, + crackles. Diminished at bases. Chest symmetric with respiratory motion. + midsternal surgical wound. + chest tube  HEART: regular rate and rhythm, no obvious murmers or gallops noted  ABD: soft non tender. + bowel sounds. No organomegaly noted  EXT: no clubbing, cyanosis, or edema noted. Pulses intact grossly  NEURO/MUSCULOSKELETAL: intubated sedated but opens eyes follows commands  SKIN: warm, dry. No obvious lesions noted  LYMPH: no obvious LAD      IMAGES:   CXR 11/23/24  On my read possible RML infiltrates  CONCLUSION: Post emergent acute type A aortic dissection repair.  Stable cardiomegaly.  Gross stable/satisfactory position of lines and tubes.  Mild pulmonary vascular congestion.       CXR 11/22/24  CONCLUSION: Post feeding tube placement with tip in the distal esophagus approximately 4 cm above the gastroesophageal junction.  Recommend advancement of the tube by approximately 10 cm to place it in the stomach.     CXR 11/22/24  CARDIAC/MEDIASTINUM: The cardiac silhouette is enlarged and unchanged.. There is a right internal jugular Austin-Dev catheter with tip at the level of the main pulmonary artery. There is a right-sided chest tube. Endotracheal tube with tip approximately    5.3 cm above the jennyfer. There is a nasogastric tube with tip and sidehole within the stomach and below the diaphragm. There are median sternotomy wires and postoperative changes of ascending aortic repair.   LUNGS: There is pulmonary  vascular redistribution without edema. Minimal blunting of the bilateral costophrenic angles may be secondary to trace pleural effusions versus chronic pleural thickening. There is mild bibasilar atelectasis. No pneumothorax.   BONES: There is degenerative disease of the thoracic spine.     Chest CTA 11/22/24  CONCLUSION:   1. Type a aortic dissection beginning just above right renal (correction:  Right coronary)  artery and extending distally into the left common iliac artery and external iliac.  Findings were called to Dr. Echavarria at 5:52 p.m.       LABS:  Recent Labs   Lab 11/21/24  1720 11/22/24  0133 11/22/24  1015 11/23/24  0418   RBC 4.58 3.38* 3.93 3.56*   HGB 12.2 9.3* 10.6* 9.6*   HCT 38.3 28.0* 32.7* 29.9*   MCV 83.6 82.8 83.2 84.0   MCH 26.6 27.5 27.0 27.0   MCHC 31.9 33.2 32.4 32.1   RDW 15.9* 15.9* 16.0* 16.4*   NEPRELIM 3.70  --   --  11.86*   WBC 6.4 14.2* 10.3 13.4*   .0 201.0 171.0 127.0*       Recent Labs   Lab 11/21/24  1720 11/22/24  0251 11/22/24  1015 11/23/24  0418   * 194* 180* 142*   BUN 21 20 24* 32*   CREATSERUM 1.19* 1.25* 1.60* 2.28*   EGFRCR 52* 49* 36* 24*   CA 9.6 8.7 8.8 8.4*   ALB 4.3  --   --  3.4   * 143 143 144   K 3.4* 4.0 4.0 4.2    111 112 112   CO2 28.0 22.0 20.0* 21.0   ALKPHO 90  --   --  49*   AST 27  --   --  33   ALT 22  --   --  9*   BILT 0.4  --   --  0.3   TP 6.9  --   --  5.3*       ASSESSMENT/PLAN:  Type A aortic dissection s/p repair now intubated in ICU  -on gtts (ntg, cleviprex, dobutamine)  -chest tube management as per CV    Post op acute respiratory failure  -complicated by self extubation and reimergent intubation and sign emesis concerning for aspiration PNA vs pneumonitis  -started on zosyn  -continue full MV support. Switched to AC yesterday  -PRN ABG and CXR  -continue propofol, precedex, PRN fentanyl  -perform cuff leak b/f extubation    Previous hx of smoking and ETOH  -CIWA once extubated or close to being ready  -continue  jeremy PRN    NAIMA on CKD  -likely from surgery  -monitor UO and renal labs  -renal being consulted today    Proph:  -DVT: hep sq    Dispo:  -full code  -discussed with CV surgery     Critical care time 38 min independent of procedures      Thank you for the opportunity to care for Alisha WildeShantell Rainey DO, MPH  Pulmonary Critical Care Medicine  [facility]                         [1]   Allergies  Allergen Reactions    Atorvastatin MYALGIA    Pravastatin MYALGIA    Rosuvastatin MYALGIA    Seasonal Runny nose   [2]   Outpatient Medications Marked as Taking for the 11/21/24 encounter (Hospital Encounter)   Medication Sig Dispense Refill    Acetaminophen ER (TYLENOL 8 HOUR) 650 MG Oral Tab CR Take 2 tablets (1,300 mg total) by mouth daily as needed.      Calcium Carb-Cholecalciferol 600-10 MG-MCG Oral Tab Take 1 tablet by mouth daily.      metoprolol succinate ER 50 MG Oral Tablet 24 Hr Take 1 tablet (50 mg total) by mouth daily. 90 tablet 3    Losartan Potassium-HCTZ 100-12.5 MG Oral Tab Take 1 tablet by mouth daily. 90 tablet 3    Potassium Chloride ER 20 MEQ Oral Tab CR Take 1 tablet by mouth daily. 90 tablet 3    albuterol 108 (90 Base) MCG/ACT Inhalation Aero Soln Inhale 2 puffs into the lungs every 4 (four) hours as needed for Wheezing. 3 each 3    amLODIPine 10 MG Oral Tab Take 1 tablet (10 mg total) by mouth daily. 90 tablet 3    Ascorbic Acid (VITAMIN C) 1000 MG Oral Tab Take 1 tablet (1,000 mg total) by mouth daily.      vitamin B-12 50 MCG Oral Tab Take 1 tablet (50 mcg total) by mouth daily.

## 2024-11-23 NOTE — PROGRESS NOTES
[facility]  part of [facility]    Progress Note    Alisha Wilde Patient Status:  Inpatient    10/25/1965 MRN [id number]   Location [facility] 2W/SW Attending Lizeth Buck MD   Hosp Day # 1 PCP Bridger Avendano MD     Chief Complaint: type a aortic dissection    SUBJECTIVE:    Patient is intubated and sedation.  Unable to do ROS.      OBJECTIVE:  Vital signs in last 24 hours:  /69 (BP Location: Right arm)   Pulse 87   Temp 97.7 °F (36.5 °C) (Pulmonary Artery)   Resp 18   Ht 5' 5\" (1.651 m)   Wt 258 lb 2.5 oz (117.1 kg)   SpO2 96%   BMI 42.96 kg/m²     Intake/Output:    Intake/Output Summary (Last 24 hours) at 2024 1308  Last data filed at 2024 1200  Gross per 24 hour   Intake 3819.65 ml   Output 1095 ml   Net 2724.65 ml       Wt Readings from Last 3 Encounters:   24 258 lb 2.5 oz (117.1 kg)   10/24/24 254 lb (115.2 kg)   24 249 lb (112.9 kg)       Exam     Gen: intubated, sedated  Pulm: decreased breath sounds  CV: Heart with regular rate and rhythm  Abd: Abdomen soft,   Neuro: sedated, unable to do  MSK: no joint swelling noted  Skin: Warm and dry  Psych: sedated, unable to check  Ext: no cyanosis    Data Review:     Labs:   Lab Results   Component Value Date    WBC 13.4 2024    HGB 9.6 2024    HCT 29.9 2024    .0 2024    CREATSERUM 2.28 2024    BUN 32 2024     2024    K 4.2 2024     2024    CO2 21.0 2024     2024    CA 8.4 2024    ALB 3.4 2024    ALKPHO 49 2024    BILT 0.3 2024    TP 5.3 2024    AST 33 2024    ALT 9 2024    MG 2.2 2024       Imaging: Reviewed    XR CHEST AP PORTABLE  (CPT=71045)    Result Date: 2024  CONCLUSION: Post emergent acute type A aortic dissection repair.  Stable cardiomegaly.  Gross stable/satisfactory position of lines and tubes.  Mild pulmonary vascular congestion.    Dictated by (CST): Jagjit Jin MD on 11/23/2024 at 9:21 AM     Finalized by (CST): Jagjit Jin MD on 11/23/2024 at 9:26 AM          XR CHEST AP PORTABLE  (CPT=71045)    Result Date: 11/22/2024  CONCLUSION:  1. ET tube in the trachea the level of the aortic knob.  NG tube is been advanced and is now at the distal body of the stomach.  No pneumothorax.  Minimal bibasilar atelectasis.    Dictated by (CST): Adalberto Santos MD on 11/22/2024 at 2:47 PM     Finalized by (CST): Adalberto Santos MD on 11/22/2024 at 2:50 PM          XR CHEST AP PORTABLE  (CPT=71045)    Result Date: 11/22/2024  CONCLUSION:  1. NG tube terminates at the distal esophagus and needs to be advanced approximately 15 cm into the body of the stomach.  ET tube in the trachea the level the clavicles.  No pneumothorax.  Linear bibasilar atelectasis.    Dictated by (CST): Adalberto Santos MD on 11/22/2024 at 1:02 PM     Finalized by (CST): Adalberto Santos MD on 11/22/2024 at 1:05 PM          XR CHEST AP PORTABLE  (CPT=71045)    Result Date: 11/22/2024  CONCLUSION: Post feeding tube placement with tip in the distal esophagus approximately 4 cm above the gastroesophageal junction.  Recommend advancement of the tube by approximately 10 cm to place it in the stomach.   Dictated by (CST): Jagjit Jin MD on 11/22/2024 at 11:53 AM     Finalized by (CST): Jagjit Jin MD on 11/22/2024 at 11:55 AM          XR CHEST AP PORTABLE  (CPT=71045)    Result Date: 11/22/2024  CONCLUSION:   Lines and tubes in place as described.  Trace pulmonary vascular redistribution without edema, unchanged.  Preliminary report was submitted by the [facility] teleradiologist and there are no significant discrepancies.  Dictated by (CST): Terrence Colorado MD on 11/22/2024 at 8:40 AM     Finalized by (CST): Terrence Colorado MD on 11/22/2024 at 8:42 AM          XR CHEST AP PORTABLE  (CPT=71045)    Result Date: 11/22/2024  CONCLUSION:   No radiopaque foreign body.  Right internal jugular [place]-Dev catheter  at the level of the right ventricular outflow tract.  Upper mediastinal surgical clips.  Endotracheal tube seen 2.6 cm above the jennyfer.  Preliminary report was submitted by the [facility] teleradiologist and there are no significant discrepancies.    Dictated by (CST): Terrence Colorado MD on 11/22/2024 at 8:03 AM     Finalized by (CST): Terrence Colorado MD on 11/22/2024 at 8:05 AM          CTA CHEST+CTA ABDOMEN DISSECT SET (CPT=71275/23990)    Addendum Date: 11/22/2024    This report includes an Addendum and supersedes previous reports for this exam.    PROCEDURE: CT CHEST ABDOMEN DISSECT SET (CPT=71275/36953)  COMPARISON: None.  INDICATIONS: chest pain  TECHNIQUE:   CT images of the chest and abdomen were obtained with intravenous contrast material.  Automated exposure control for dose reduction was used. Adjustment of the mA and/or kV was done based on the patient's size. Use of iterative reconstruction technique for dose reduction was used.  Dose information is transmitted to the ACR (American College of Radiology) NRDR (National Radiology Data Registry) which includes the Dose Index Registry.   Aorta/vascular: A type a aortic dissection beginning just above the right coronary artery and extending distally into the left common iliac and left external iliac resulting and significant narrowing of left external iliac at the edge of the field of view.  The false lumen is low right lateral along the ascending aorta and left lateral along the descending aorta.  The innominate, left common carotid and subclavian all, off of the true lumen and are patent.  Celiac, SMA, right renal artery are patent and arise from the true lumen.  The left renal artery straddles the true and false lumen and the dissection extends into the origin of the left renal artery and results in approximately 50 % narrowing of the origin.  Beyond the origin, the left renal artery is patent.  The inferior mesenteric artery arises from the false lumen.  In the  upper abdomen, the false lumen occupies about 50 to 60 % of aortic diameter in the juxtarenal region, and in the infrarenal region the false lumen Flow a a a gutter I going to read this rule quick is a type a dissection or call urate back thanks by a low yes please high melonie cimetidine Alishaedith mccarty as a type a dissection it begins just above the right coronary goes down into the left iliac and the left external iliac locks like it does not have much blood in the thank you by occupies about 20 %. CHEST FINDINGS: CARDIAC: Mild coronary artery calcification.  Mildly dilated left ventricle.  MEDIASTINUM/HAMLET: No mass or adenopathy.  PULMONARY ARTERIES: Top-normal main pulmonary artery.  No pulmonary embolus through segmental level. LUNGS/PLEURA: Subsegmental atelectasis and or scar in lower lungs.  No consolidation or pleural effusion.  Trachea and mainstem bronchi are patent. CHEST WALL: No mass or axillary adenopathy.  OTHER: Negative.  ABDOMEN FINDINGS: Evaluation of solid organs is limited to the arterial phase of contrast-enhancement. LIVER: Mild hepatic steatosis. SPLEEN: Normal.  PANCREAS: Normal.  BILIARY: No evidence for cholecystitis or biliary dilatation. ADRENALS: Normal.    KIDNEYS: Bilateral simple renal cysts.  A 2 mm nonobstructing right lower pole renal calculus. LYMPHADENOPATHY: None. GI/MESENTERY: Small to moderate hiatal hernia.  Colonic diverticulosis.  No pneumatosis, portal venous gas or other findings of ischemia.  No visible mass, obstruction, or bowel wall thickening.  ABDOMINAL WALL: Fat containing umbilical hernia.  ASCITES:                           None OTHER: Negative.  BONES: No suspect bone lesion or fracture.  CONCLUSION:  1. Type a aortic dissection beginning just above right renal (correction:  Right coronary)  artery and extending distally into the left common iliac artery and external iliac.  Findings were called to Dr. Echavarria at 5:52 p.m.    Dictated by (CST): Balaji Alan MD on  11/21/2024 at 5:45 PM     Finalized by (CST): Balaji Alan MD on 11/21/2024 at 5:56 PM     ADDENDUM:  Correction to the conclusion: Type a aortic dissection beginning just above right coronary artery and extending distally into the left common iliac and external iliac .   Dictated by (CST): Balaji Alan MD on 11/22/2024 at 0:16 AM     Finalized by (CST): Balaji Alan MD on 11/22/2024 at 0:17 AM             Result Date: 11/22/2024  CONCLUSION:  1. Type a aortic dissection beginning just above right renal artery and extending distally into the left common iliac artery and external iliac.  Findings were called to Dr. Echavarria at 5:52 p.m.    Dictated by (CST): Balaji Alan MD on 11/21/2024 at 5:45 PM     Finalized by (CST): Balaji Alan MD on 11/21/2024 at 5:56 PM          XR CHEST AP PORTABLE  (CPT=71045)    Result Date: 11/21/2024  CONCLUSION: Cardiac enlargement with secondary signs of slight/early fluid overload.    Dictated by (CST): Bo Ramachandran MD on 11/21/2024 at 6:25 PM     Finalized by (CST): Bo Ramachandran MD on 11/21/2024 at 6:25 PM           Meds:   Current Facility-Administered Medications   Medication Dose Route Frequency    heparin (Porcine) 5000 UNIT/ML injection 5,000 Units  5,000 Units Subcutaneous Q8H GABRIEL    acetaminophen (Ofirmev) 10 mg/mL infusion premix 1,000 mg  1,000 mg Intravenous Q8H    melatonin tab 3 mg  3 mg Oral Nightly PRN    sennosides (Senokot) tab 8.6 mg  8.6 mg Oral BID    docusate sodium (Colace) cap 100 mg  100 mg Oral BID    polyethylene glycol (PEG 3350) (Miralax) 17 g oral packet 17 g  17 g Oral Daily PRN    bisacodyl (Dulcolax) 10 MG rectal suppository 10 mg  10 mg Rectal Daily PRN    ondansetron (Zofran) 4 MG/2ML injection 4 mg  4 mg Intravenous Q6H PRN    dexmedeTOMIDine in sodium chloride 0.9% (Precedex) 400 mcg/100mL infusion premix  0.2-1.5 mcg/kg/hr (Dosing Weight) Intravenous Continuous    DOBUTamine in dextrose 5% (Dobutrex) 500 mg/250mL infusion premix   2.5-20 mcg/kg/min (Dosing Weight) Intravenous Continuous PRN    nitroGLYCERIN in dextrose 5% 50 mg/250mL infusion premix  5-300 mcg/min Intravenous Continuous PRN    norepinephrine (Levophed) 4 mg/250mL infusion premix  0.5-30 mcg/min Intravenous Continuous PRN    metoprolol tartrate (Lopressor) tab 25 mg  25 mg Oral 2x Daily(Beta Blocker)    clevidipine (Cleviprex) 25 MG/50ML IV infusion  1-6 mg/hr Intravenous Continuous    potassium chloride 20 mEq/100mL IVPB premix 20 mEq  20 mEq Intravenous PRN    Or    potassium chloride 40 mEq/100mL IVPB premix (central line) 40 mEq  40 mEq Intravenous PRN    magnesium sulfate in dextrose 5% 1 g/100mL infusion premix 1 g  1 g Intravenous PRN    magnesium sulfate in sterile water for injection 2 g/50mL IVPB premix 2 g  2 g Intravenous PRN    mupirocin (Bactroban) 2% nasal ointment 1 Application  1 Application Nasal BID    chlorhexidine gluconate (Peridex) 0.12 % oral solution 15 mL  15 mL Mouth/Throat BID    dextrose 5%-sodium chloride 0.45% infusion   mL/hr Intravenous Continuous    morphINE PF 2 MG/ML injection 2 mg  2 mg Intravenous Q2H PRN    Or    morphINE PF 4 MG/ML injection 4 mg  4 mg Intravenous Q2H PRN    albumin human (Albumin) 5% injection 12.5 g  12.5 g Intravenous Once PRN    propofol (Diprivan) 10 mg/mL infusion premix  5-50 mcg/kg/min (Dosing Weight) Intravenous Continuous    fentaNYL (Sublimaze) 50 mcg/mL injection 25 mcg  25 mcg Intravenous Q3H PRN    piperacillin-tazobactam (Zosyn) 3.375 g in dextrose 5% 100 mL IVPB-ADDV  3.375 g Intravenous Q8H    metoclopramide (Reglan) 5 mg/mL injection 5 mg  5 mg Intravenous Q6H    famotidine (Pepcid) tab 20 mg  20 mg Oral Daily    Or    famotidine (Pepcid) 20 mg/2mL injection 20 mg  20 mg Intravenous Daily    aspirin chewable tab 81 mg  81 mg Oral Daily    norepinephrine (Levophed) 4 mg/250mL infusion premix  0.5-30 mcg/min Intravenous Continuous    insulin regular human (Novolin R, Humulin R) 100 Units in sodium  chloride 0.9% 100 mL standard infusion (100 mL)  1-40 Units/hr Intravenous Continuous         Assessment  Patient Active Problem List   Diagnosis    Hypercholesteremia    Alcoholism (HCC)    Essential hypertension    Vitamin D deficiency    Adjustment disorder with mixed anxiety and depressed mood    Controlled type 2 diabetes mellitus without complication, without long-term current use of insulin (HCC)    Obesity (BMI 30-39.9)    Neck mass    Polyp of colon    Flat feet, bilateral    Hypokalemia    Myalgia due to statin    Age-related nuclear cataract of both eyes    Positive for microalbuminuria    Dissection of ascending aorta (HCC)       Plan:     Acute type A aortic dissection  - went from RCA to left common and external iliac arteries  - s/p emergent repair by Dr Gregg  - will remain intubated for today - still with high fi02 requirements   - pulm following  - repeat cxr in am  - wound care per surgery  - echo pending    Acute post op respiratory failure  - with self extubation and then emergently re-intubated  - possible aspiration pneumonia after that   - started zosyn    HTN  - cleviprex/nitroglycerin - wean able    Third degree heart block  - improved with atropine     Hx of etoh abuxe  - monitor    Morbid obesity with likely TANYA  - BMI 42  - fu as outpt    NAIMA on CKD stage 2-3 - possible ATN vs contrast induced  - renal consulted  - plan renal us  - monitor I/O  - monitor closely    Dispo: pending clinical course    Global A/P  - reviewed labs and vitals from today  - reviewed notes of the day  - cbc, bmp, mag ordered for tomorrow  - discussed need to stay in the hospital today due to above  - discussed with patient/RN and care team    MDM: High complexity- acute illness posing a threat to life. IV meds requiring close inpatient monitoring. I personally spent time on chart/note review, review of labs/imaging, discussion with patient, physical exam, discussion with staff, writing progress note, and  discussion of plan of care.      Lizeth Buck MD  11/22/2024  2:33 PM    Supplementary Documentation:   DVT Mechanical Prophylaxis: MYNOR hose, SCDs,    DVT Pharmacologic Prophylaxis   Medication    heparin (Porcine) 5000 UNIT/ML injection 5,000 Units      DVT Pharmacologic prophylaxis: Aspirin 162 mg         Code Status: Full Code  Webb: Webb catheter in place  Webb Duration (in days): 2  Central line: central venous catheter in place  BRANDON:

## 2024-11-23 NOTE — PROGRESS NOTES
[facility]  part of [facility]    Progress Note    Alisha Wilde Patient Status:  Inpatient    10/25/1965 MRN [id number]   Location [facility] 2W/SW Attending Lizeth Buck MD   Hosp Day # 1 PCP Bridger Avendano MD     Subjective:  Intubated/sedated-but is arousable/follows simple commands.  Yesterday afternoon was extubated/reintubated secondary to aspiration/respiratory failure at bedside per Dr. Rainey.  An OGT was being attempted at the time, patient desaturated/vomited.  ET tube removed per respiratory/remained hemodynamically stable and was reintubated.  Emesis during intubation, OGT placed per critical care with scope due to narrow airway.  Hemodynamically stable this a.m., remains on low-dose levo alternating with nitro to maintain maps 70, SBP less than 130.  Friend currently visiting at bedside.    Objective:  /80 (BP Location: Right arm)   Pulse 82   Temp 98.2 °F (36.8 °C) (Pulmonary Artery)   Resp 18   Ht 5' 5\" (1.651 m)   Wt 258 lb 2.5 oz (117.1 kg)   SpO2 99%   BMI 42.96 kg/m²     Temp (24hrs), Av °F (36.7 °C), Min:97.4 °F (36.3 °C), Max:98.8 °F (37.1 °C)  Telemetry-NSR with PVC    Intake/Output:    Intake/Output Summary (Last 24 hours) at 2024 0912  Last data filed at 2024 0800  Gross per 24 hour   Intake 3819.65 ml   Output 1091 ml   Net 2728.65 ml       Wt Readings from Last 3 Encounters:   24 258 lb 2.5 oz (117.1 kg)   10/24/24 254 lb (115.2 kg)   24 249 lb (112.9 kg)       Allergies:  Allergies[1]    Labs:  Lab Results   Component Value Date    WBC 13.4 2024    HGB 9.6 2024    HCT 29.9 2024    .0 2024    CREATSERUM 2.28 2024    BUN 32 2024     2024    K 4.2 2024     2024    CO2 21.0 2024     2024    CA 8.4 2024    ALB 3.4 2024    ALKPHO 49 2024    BILT 0.3 2024    TP 5.3 2024    AST 33 2024     ALT 9 11/23/2024    MG 2.2 11/23/2024       Physical Exam:  Blood pressure 132/80, pulse 82, temperature 98.2 °F (36.8 °C), temperature source Pulmonary Artery, resp. rate 18, height 5' 5\" (1.651 m), weight 258 lb 2.5 oz (117.1 kg), SpO2 99%, not currently breastfeeding.  General: Intubated/sedated-moves all extremities spontaneously, does follow simple commands when aroused/sedation lightened  Neck: Unable to assess due to body habitus/intubation/RIJ cordis/[place]  Lungs: Coarse bilateral  CT -pericardial-15 cc / 12-hour, pleural 90 cc / 12-hour-no airleak noted  Heart: RRR, S1, S2  Abdomen: Soft, NT/ND, BS hypoactive  Extremities: Warm, dry, 1+ generalized edema  Pulses: 2+ bilat DP  Skin: sternotomy stable, Provena dressing intact  Neurological: Sedated/intubated, positive gag with suctioning/spontaneously moves all extremities with sedation lightened    Assessment/Plan:  S/p emergent aortic dissection repair-POD 1  Respiratory failure-remain intubated today/full vent support -emesis/aspiration likely yesterday-on Zosyn, repeat CXR in a.m.   Hemodynamically stable on Cleviprex/nitro-wean as tolerated to maintain SBP less than 130, MAP 70.  EKG reviewed  Pain meds as needed, fentanyl IV/MSO4 as needed  DVT prevention - SCDs/Heaprin  Expected postop anemia - mild/stable, Hgb 9.6-continue to monitor, once extubated will start iron  NAIMA/expected postop volume overload -nephrology consulted, CRT 2.2 today, urine output 30 to 50 cc/h, MAP stable.  Renal dose medications, avoid nephrotoxic agents.  Lasix as needed per renal  Insulin coverage per protocol -no history DM preop, anticipate transitioning off insulin drip once extubated.  EtOH use per family-[facility] protocol    DC planning-patient lives alone, has supportive family.  Anticipate DC home once medically stable.  Appreciate SW/CM to assist with DC planning    Plan of care discussed with pt, bedside RN, family friend, and CV Surgeon: Dr. Campbell Randle,  APRN  11/23/2024  0930       [1]   Allergies  Allergen Reactions    Atorvastatin MYALGIA    Pravastatin MYALGIA    Rosuvastatin MYALGIA    Seasonal Runny nose

## 2024-11-23 NOTE — PLAN OF CARE
Patient was received intubated, on full vent support this morning, perm MD to remain intubated today.  RT was called to bedside this afternoon for desaturation and no Vts seen on ventilator.  Upon arrival large amounts of gastric contents were seen on ETT. Patient was immediately Manually ventilated. No chest rise was seen, Spo2 continued to drop, and copious amounts of gastric contents continued to be suctioned from ETT. MD called and d/t persistent low sats now in the low 80s ETT was withdrawn and patient was manually ventilated with mask.  Spo2 improved shortly. Once MD at bedside patient was successfully re-intubated.      Problem: RESPIRATORY - ADULT  Goal: Achieves optimal ventilation and oxygenation  Description: INTERVENTIONS:  - Assess for changes in respiratory status  - Assess for changes in mentation and behavior  - Position to facilitate oxygenation and minimize respiratory effort  - Oxygen supplementation based on oxygen saturation or ABGs  - Provide Smoking Cessation handout, if applicable  - Encourage broncho-pulmonary hygiene including cough, deep breathe, Incentive Spirometry  - Assess the need for suctioning and perform as needed  - Assess and instruct to report SOB or any respiratory difficulty  - Respiratory Therapy support as indicated  - Manage/alleviate anxiety  - Monitor for signs/symptoms of CO2 retention  Outcome: Progressing

## 2024-11-23 NOTE — RESPIRATORY THERAPY NOTE
Patient received on vent:       11/23/24 0520   Vent Information   Vent Mode VC/AC   Settings   FiO2 (%) 80 %   Resp Rate (Set) 16   Vt (Set, mL) 550 mL   Waveform Decelerating ramp   PEEP/CPAP (cm H2O) 8 cm H20     Bilateral BS auscultated, FiO2 weaned down to 60%. Patient tolerating changes fine. RT will monitor.

## 2024-11-23 NOTE — PLAN OF CARE
Problem: RESPIRATORY - ADULT  Goal: Achieves optimal ventilation and oxygenation  Description: INTERVENTIONS:  - Assess for changes in respiratory status  - Assess for changes in mentation and behavior  - Position to facilitate oxygenation and minimize respiratory effort  - Oxygen supplementation based on oxygen saturation or ABGs  - Provide Smoking Cessation handout, if applicable  - Encourage broncho-pulmonary hygiene including cough, deep breathe, Incentive Spirometry  - Assess the need for suctioning and perform as needed  - Assess and instruct to report SOB or any respiratory difficulty  - Respiratory Therapy support as indicated  - Manage/alleviate anxiety  - Monitor for signs/symptoms of CO2 retention  Outcome: Progressing      Pt remains intubated with following settings. No acute events overnight. RT continue to monitor.         11/22/24 2130   Vent Information   Vent Mode VC/AC   Settings   FiO2 (%) 80 %   Resp Rate (Set) 16   Vt (Set, mL) 550 mL   Waveform Decelerating ramp   PEEP/CPAP (cm H2O) 8 cm H20

## 2024-11-24 ENCOUNTER — APPOINTMENT (OUTPATIENT)
Dept: GENERAL RADIOLOGY | Facility: HOSPITAL | Age: 59
End: 2024-11-24
Attending: NURSE PRACTITIONER
Payer: COMMERCIAL

## 2024-11-24 ENCOUNTER — APPOINTMENT (OUTPATIENT)
Dept: GENERAL RADIOLOGY | Facility: HOSPITAL | Age: 59
DRG: 003 | End: 2024-11-24
Attending: NURSE PRACTITIONER
Payer: COMMERCIAL

## 2024-11-24 LAB
ALBUMIN SERPL-MCNC: 3.4 G/DL (ref 3.2–4.8)
ANION GAP SERPL CALC-SCNC: 10 MMOL/L (ref 0–18)
BUN BLD-MCNC: 32 MG/DL (ref 9–23)
BUN/CREAT SERPL: 23.2 (ref 10–20)
CALCIUM BLD-MCNC: 8.3 MG/DL (ref 8.7–10.4)
CHLORIDE SERPL-SCNC: 111 MMOL/L (ref 98–112)
CO2 SERPL-SCNC: 23 MMOL/L (ref 21–32)
CREAT BLD-MCNC: 1.38 MG/DL
DEPRECATED RDW RBC AUTO: 49 FL (ref 35.1–46.3)
EGFRCR SERPLBLD CKD-EPI 2021: 44 ML/MIN/1.73M2 (ref 60–?)
ERYTHROCYTE [DISTWIDTH] IN BLOOD BY AUTOMATED COUNT: 16.3 % (ref 11–15)
GLUCOSE BLD-MCNC: 132 MG/DL (ref 70–99)
GLUCOSE BLDC GLUCOMTR-MCNC: 100 MG/DL (ref 70–99)
GLUCOSE BLDC GLUCOMTR-MCNC: 101 MG/DL (ref 70–99)
GLUCOSE BLDC GLUCOMTR-MCNC: 102 MG/DL (ref 70–99)
GLUCOSE BLDC GLUCOMTR-MCNC: 104 MG/DL (ref 70–99)
GLUCOSE BLDC GLUCOMTR-MCNC: 108 MG/DL (ref 70–99)
GLUCOSE BLDC GLUCOMTR-MCNC: 110 MG/DL (ref 70–99)
GLUCOSE BLDC GLUCOMTR-MCNC: 111 MG/DL (ref 70–99)
GLUCOSE BLDC GLUCOMTR-MCNC: 112 MG/DL (ref 70–99)
GLUCOSE BLDC GLUCOMTR-MCNC: 115 MG/DL (ref 70–99)
GLUCOSE BLDC GLUCOMTR-MCNC: 120 MG/DL (ref 70–99)
GLUCOSE BLDC GLUCOMTR-MCNC: 120 MG/DL (ref 70–99)
GLUCOSE BLDC GLUCOMTR-MCNC: 121 MG/DL (ref 70–99)
GLUCOSE BLDC GLUCOMTR-MCNC: 129 MG/DL (ref 70–99)
GLUCOSE BLDC GLUCOMTR-MCNC: 129 MG/DL (ref 70–99)
GLUCOSE BLDC GLUCOMTR-MCNC: 132 MG/DL (ref 70–99)
GLUCOSE BLDC GLUCOMTR-MCNC: 134 MG/DL (ref 70–99)
GLUCOSE BLDC GLUCOMTR-MCNC: 139 MG/DL (ref 70–99)
GLUCOSE BLDC GLUCOMTR-MCNC: 96 MG/DL (ref 70–99)
GLUCOSE BLDC GLUCOMTR-MCNC: 98 MG/DL (ref 70–99)
HCT VFR BLD AUTO: 26.1 %
HGB BLD-MCNC: 8.5 G/DL
MAGNESIUM SERPL-MCNC: 2.2 MG/DL (ref 1.6–2.6)
MCH RBC QN AUTO: 27.1 PG (ref 26–34)
MCHC RBC AUTO-ENTMCNC: 32.6 G/DL (ref 31–37)
MCV RBC AUTO: 83.1 FL
OSMOLALITY SERPL CALC.SUM OF ELEC: 307 MOSM/KG (ref 275–295)
PHOSPHATE SERPL-MCNC: 3.3 MG/DL (ref 2.4–5.1)
PLATELET # BLD AUTO: 106 10(3)UL (ref 150–450)
PLATELETS.RETICULATED NFR BLD AUTO: 7.5 % (ref 0–7)
POTASSIUM SERPL-SCNC: 3.3 MMOL/L (ref 3.5–5.1)
RBC # BLD AUTO: 3.14 X10(6)UL
SODIUM SERPL-SCNC: 144 MMOL/L (ref 136–145)
WBC # BLD AUTO: 9.3 X10(3) UL (ref 4–11)

## 2024-11-24 PROCEDURE — 99291 CRITICAL CARE FIRST HOUR: CPT | Performed by: INTERNAL MEDICINE

## 2024-11-24 PROCEDURE — 99233 SBSQ HOSP IP/OBS HIGH 50: CPT | Performed by: INTERNAL MEDICINE

## 2024-11-24 PROCEDURE — 71045 X-RAY EXAM CHEST 1 VIEW: CPT | Performed by: NURSE PRACTITIONER

## 2024-11-24 RX ORDER — ALBUMIN HUMAN 50 G/1000ML
12.5 SOLUTION INTRAVENOUS ONCE
Status: COMPLETED | OUTPATIENT
Start: 2024-11-24 | End: 2024-11-24

## 2024-11-24 RX ORDER — HYDROCODONE BITARTRATE AND ACETAMINOPHEN 5; 325 MG/1; MG/1
2 TABLET ORAL EVERY 4 HOURS PRN
Status: DISCONTINUED | OUTPATIENT
Start: 2024-11-24 | End: 2024-12-23

## 2024-11-24 RX ORDER — FUROSEMIDE 10 MG/ML
20 INJECTION INTRAMUSCULAR; INTRAVENOUS
Status: DISCONTINUED | OUTPATIENT
Start: 2024-11-24 | End: 2024-11-25

## 2024-11-24 RX ORDER — ACETAMINOPHEN 10 MG/ML
1000 INJECTION, SOLUTION INTRAVENOUS EVERY 8 HOURS
Status: COMPLETED | OUTPATIENT
Start: 2024-11-24 | End: 2024-11-25

## 2024-11-24 NOTE — PLAN OF CARE
Patient remains intubated and sedated. Opens eyes when name called. Bilateral wrist restraints on. OG to LIS draining bile colored output-200ml. Continue precedex drip, propofol drip, insulin drip, dobutamine drip, cleveprex drip and IV/ABT. Difficult to wean off from pressor and dobutamine. Patient right away wakes up and becomes very anxious. Pacer wires intact with no paced beats. Prevena wound vac intact. [place] and [place] all working. Afebrile. Turned to sides.     Problem: CARDIOVASCULAR - ADULT  Goal: Maintains optimal cardiac output and hemodynamic stability  Description: INTERVENTIONS:  - Monitor vital signs, rhythm, and trends  - Monitor for bleeding, hypotension and signs of decreased cardiac output  - Evaluate effectiveness of vasoactive medications to optimize hemodynamic stability  - Monitor arterial and/or venous puncture sites for bleeding and/or hematoma  - Assess quality of pulses, skin color and temperature  - Assess for signs of decreased coronary artery perfusion - ex. Angina  - Evaluate fluid balance, assess for edema, trend weights  Outcome: Progressing  Goal: Absence of cardiac arrhythmias or at baseline  Description: INTERVENTIONS:  - Continuous cardiac monitoring, monitor vital signs, obtain 12 lead EKG if indicated  - Evaluate effectiveness of antiarrhythmic and heart rate control medications as ordered  - Initiate emergency measures for life threatening arrhythmias  - Monitor electrolytes and administer replacement therapy as ordered  Outcome: Progressing     Problem: RESPIRATORY - ADULT  Goal: Achieves optimal ventilation and oxygenation  Description: INTERVENTIONS:  - Assess for changes in respiratory status  - Assess for changes in mentation and behavior  - Position to facilitate oxygenation and minimize respiratory effort  - Oxygen supplementation based on oxygen saturation or ABGs  - Provide Smoking Cessation handout, if applicable  - Encourage broncho-pulmonary hygiene including  cough, deep breathe, Incentive Spirometry  - Assess the need for suctioning and perform as needed  - Assess and instruct to report SOB or any respiratory difficulty  - Respiratory Therapy support as indicated  - Manage/alleviate anxiety  - Monitor for signs/symptoms of CO2 retention  Outcome: Progressing     Problem: GASTROINTESTINAL - ADULT  Goal: Minimal or absence of nausea and vomiting  Description: INTERVENTIONS:  - Maintain adequate hydration with IV or PO as ordered and tolerated  - Nasogastric tube to low intermittent suction as ordered  - Evaluate effectiveness of ordered antiemetic medications  - Provide nonpharmacologic comfort measures as appropriate  - Advance diet as tolerated, if ordered  - Obtain nutritional consult as needed  - Evaluate fluid balance  Outcome: Progressing  Goal: Maintains or returns to baseline bowel function  Description: INTERVENTIONS:  - Assess bowel function  - Maintain adequate hydration with IV or PO as ordered and tolerated  - Evaluate effectiveness of GI medications  - Encourage mobilization and activity  - Obtain nutritional consult as needed  - Establish a toileting routine/schedule  - Consider collaborating with pharmacy to review patient's medication profile  Outcome: Progressing     Problem: METABOLIC/FLUID AND ELECTROLYTES - ADULT  Goal: Glucose maintained within prescribed range  Description: INTERVENTIONS:  - Monitor Blood Glucose as ordered  - Assess for signs and symptoms of hyperglycemia and hypoglycemia  - Administer ordered medications to maintain glucose within target range  - Assess barriers to adequate nutritional intake and initiate nutrition consult as needed  - Instruct patient on self management of diabetes  Outcome: Progressing  Goal: Electrolytes maintained within normal limits  Description: INTERVENTIONS:  - Monitor labs and rhythm and assess patient for signs and symptoms of electrolyte imbalances  - Administer electrolyte replacement as ordered  -  Monitor response to electrolyte replacements, including rhythm and repeat lab results as appropriate  - Fluid restriction as ordered  - Instruct patient on fluid and nutrition restrictions as appropriate  Outcome: Progressing  Goal: Hemodynamic stability and optimal renal function maintained  Description: INTERVENTIONS:  - Monitor labs and assess for signs and symptoms of volume excess or deficit  - Monitor intake, output and patient weight  - Monitor urine specific gravity, serum osmolarity and serum sodium as indicated or ordered  - Monitor response to interventions for patient's volume status, including labs, urine output, blood pressure (other measures as available)  - Encourage oral intake as appropriate  - Instruct patient on fluid and nutrition restrictions as appropriate  Outcome: Progressing     Problem: SKIN/TISSUE INTEGRITY - ADULT  Goal: Skin integrity remains intact  Description: INTERVENTIONS  - Assess and document risk factors for pressure ulcer development  - Assess and document skin integrity  - Monitor for areas of redness and/or skin breakdown  - Initiate interventions, skin care algorithm/standards of care as needed  Outcome: Progressing  Goal: Incision(s), wounds(s) or drain site(s) healing without S/S of infection  Description: INTERVENTIONS:  - Assess and document risk factors for pressure ulcer development  - Assess and document skin integrity  - Assess and document dressing/incision, wound bed, drain sites and surrounding tissue  - Implement wound care per orders  - Initiate isolation precautions as appropriate  - Initiate Pressure Ulcer prevention bundle as indicated  Outcome: Progressing  Goal: Oral mucous membranes remain intact  Description: INTERVENTIONS  - Assess oral mucosa and hygiene practices  - Implement preventative oral hygiene regimen  - Implement oral medicated treatments as ordered  Outcome: Progressing     Problem: HEMATOLOGIC - ADULT  Goal: Maintains hematologic  stability  Description: INTERVENTIONS  - Assess for signs and symptoms of bleeding or hemorrhage  - Monitor labs and vital signs for trends  - Administer supportive blood products/factors, fluids and medications as ordered and appropriate  - Administer supportive blood products/factors as ordered and appropriate  Outcome: Progressing  Goal: Free from bleeding injury  Description: (Example usage: patient with low platelets)  INTERVENTIONS:  - Avoid intramuscular injections, enemas and rectal medication administration  - Ensure safe mobilization of patient  - Hold pressure on venipuncture sites to achieve adequate hemostasis  - Assess for signs and symptoms of internal bleeding  - Monitor lab trends  - Patient is to report abnormal signs of bleeding to staff  - Avoid use of toothpicks and dental floss  - Use electric shaver for shaving  - Use soft bristle tooth brush  - Limit straining and forceful nose blowing  Outcome: Progressing     Problem: MUSCULOSKELETAL - ADULT  Goal: Return mobility to safest level of function  Description: INTERVENTIONS:  - Assess patient stability and activity tolerance for standing, transferring and ambulating w/ or w/o assistive devices  - Assist with transfers and ambulation using safe patient handling equipment as needed  - Ensure adequate protection for wounds/incisions during mobilization  - Obtain PT/OT consults as needed  - Advance activity as appropriate  - Communicate ordered activity level and limitations with patient/family  Outcome: Progressing  Goal: Maintain proper alignment of affected body part  Description: INTERVENTIONS:  - Support and protect limb and body alignment per provider's orders  - Instruct and reinforce with patient and family use of appropriate assistive device and precautions (e.g. spinal or hip dislocation precautions)  Outcome: Progressing     Problem: NEUROLOGICAL - ADULT  Goal: Achieves stable or improved neurological status  Description: INTERVENTIONS  -  Assess for and report changes in neurological status  - Initiate measures to prevent increased intracranial pressure  - Maintain blood pressure and fluid volume within ordered parameters to optimize cerebral perfusion and minimize risk of hemorrhage  - Monitor temperature, glucose, and sodium. Initiate appropriate interventions as ordered  Outcome: Progressing     Problem: Safety Risk - Non-Violent Restraints  Goal: Patient will remain free from self-harm  Description: INTERVENTIONS:  - Apply the least restrictive restraint to prevent harm  - Notify patient and family of reasons restraints applied  - Assess for any contributing factors to confusion (electrolyte disturbances, delirium, medications)  - Discontinue any unnecessary medical devices as soon as possible  - Assess the patient's physical comfort, circulation, skin condition, hydration, nutrition and elimination needs   - Reorient and redirection as needed  - Assess for the need to continue restraints  11/24/2024 0619 by Sheeba Saleem, RN  Outcome: Progressing  11/23/2024 2213 by Sheeba Saleem, RN  Outcome: Not Progressing

## 2024-11-24 NOTE — PROGRESS NOTES
[facility]  part of Located within [facility]    Progress Note    Alisha Wilde Patient Status:  Inpatient    10/25/1965 MRN [id number]   Location [facility] 2W/SW Attending Lizeth Buck MD   Hosp Day # 2 PCP Bridger Avendano MD     Subjective:  Remains intubated/sedated-but is arousable and does follow commands appropriately, responds to simple questions.  Currently denies incisional pain.  Labile BP-remains on low-dose levo alternating with Cleviprex/nitro to maintain maps 70, SBP<130.  No visitors at bedside currently.    Objective:  BP 90/55 (BP Location: Right arm)   Pulse 74   Temp 98 °F (36.7 °C) (Pulmonary Artery)   Resp 18   Ht 5' 5\" (1.651 m)   Wt 258 lb 2.5 oz (117.1 kg)   SpO2 95%   BMI 42.96 kg/m²     Temp (24hrs), Av.9 °F (36.6 °C), Min:97.6 °F (36.4 °C), Max:98.5 °F (36.9 °C)  Telemetry-NSR     Intake/Output:    Intake/Output Summary (Last 24 hours) at 2024 0842  Last data filed at 2024 0700  Gross per 24 hour   Intake 3316.77 ml   Output 2778 ml   Net 538.77 ml       Wt Readings from Last 3 Encounters:   24 258 lb 2.5 oz (117.1 kg)   10/24/24 254 lb (115.2 kg)   24 249 lb (112.9 kg)       Allergies:  Allergies[1]    Labs:  Lab Results   Component Value Date    WBC 9.3 2024    HGB 8.5 2024    HCT 26.1 2024    .0 2024    CREATSERUM 1.38 2024    BUN 32 2024     2024    K 3.3 2024     2024    CO2 23.0 2024     2024    CA 8.3 2024    ALB 3.4 2024    MG 2.2 2024    PHOS 3.3 2024       Physical Exam:  Blood pressure 90/55, pulse 74, temperature 98 °F (36.7 °C), temperature source Pulmonary Artery, resp. rate 18, height 5' 5\" (1.651 m), weight 258 lb 2.5 oz (117.1 kg), SpO2 95%, not currently breastfeeding.  General: Intubated/sedated  Neck: Unable to assess due to body habitus/intubation/RIJ cordis/[place]  Lungs: Few scattered rhonchi  ant, diminished laterally  CT -pericardial-10cc / 12-hour, pleural 120 cc / 12-hour-no airleak noted  Heart: RRR, S1, S2  Abdomen: Soft, NT/ND, BS hypoactive  Extremities: Warm, dry, 1+ generalized edema  Pulses: 2+ bilat DP  Skin: sternotomy stable, Provena dressing intact  Neurological: Sedated/intubated on Precedex/propofol.  Able to move all extremities, follow commands    Assessment/Plan:  S/p emergent aortic dissection repair-POD 2  Respiratory failure-remains intubated-wean vent per critical care.  Currently on 50% FiO2/5 PEEP.  CXR reviewed.  Continue Zosyn for suspected aspiration pneumonia.  Hemodynamically stable -alternating low-dose levo with Cleviprex/nitro secondary to labile BP.-wean as tolerated to maintain SBP less than 130, MAP 70.  Resume home antihypertensives per cardiology.  Dobutamine DC'd-DC [place] this a.m.  EKG reviewed  Continue ICU status   Pain meds as needed, fentanyl IV/MSO4 as needed  DVT prevention - SCDs/heparin  Expected postop anemia - mild/stable, Hgb 8.5 -likely delusional component, continue to monitor, once extubated will start iron  NAIMA/expected postop volume overload -appreciate nephrology consult, CRT improved 1.38 today, good UO.  Needs diuresis -net +5 L since OR.  Renal dose medications, avoid nephrotoxic agents.   Insulin coverage per protocol -no history DM preop, anticipate transitioning off insulin drip once extubated.  EtOH use per family-CIWA protocol      DC planning-patient lives alone, has supportive family.  Anticipate DC home once medically stable.  Appreciate SW/CM to assist with DC planning    Plan of care discussed with pt, bedside RN, family friend, and CV Surgeon: Dr. Campbell Randle, APRN  1124/24  0 845       [1]   Allergies  Allergen Reactions    Atorvastatin MYALGIA    Pravastatin MYALGIA    Rosuvastatin MYALGIA    Seasonal Runny nose

## 2024-11-24 NOTE — PROGRESS NOTES
Pulmonary/ICU/Critical Care Progress Note        Reason for Consultation: post op care  Referring Physician: Dr. Gregg    Seen this am.     SUBJECTIVE:  Afebrile on lower vent settings.  Remains on intermittent levo, clev, and ntg.      ALLERGIES:  Allergies[1]      MEDS:  Home Medications:  Medications Taking[2]    Scheduled Medication:   furosemide  20 mg Intravenous BID (Diuretic)    acetaminophen  1,000 mg Intravenous Q8H    heparin  5,000 Units Subcutaneous Q8H GABRIEL    sennosides  8.6 mg Oral BID    docusate sodium  100 mg Oral BID    metoprolol tartrate  25 mg Oral 2x Daily(Beta Blocker)    mupirocin  1 Application Nasal BID    chlorhexidine gluconate  15 mL Mouth/Throat BID    piperacillin-tazobactam  3.375 g Intravenous Q8H    metoclopramide  5 mg Intravenous Q6H    famotidine  20 mg Oral Daily    Or    famotidine  20 mg Intravenous Daily    aspirin  81 mg Oral Daily     Continuous Infusing Medication:   dexmedetomidine 0.2 mcg/kg/hr (11/24/24 0902)    nitroGLYCERIN in dextrose 5% 30 mcg/min (11/24/24 0820)    norepinephrine Stopped (11/23/24 2225)    clevidipine 1 mg/hr (11/24/24 0820)    dextrose 5%-sodium chloride 0.45% 40 mL/hr (11/24/24 0700)    propofol Stopped (11/24/24 0901)    norepinephrine      insulin regular 5 Units/hr (11/24/24 0700)     PRN Medications:    HYDROcodone-acetaminophen    melatonin    polyethylene glycol (PEG 3350)    bisacodyl    ondansetron    nitroGLYCERIN in dextrose 5%    norepinephrine    potassium chloride **OR** potassium chloride    magnesium sulfate in dextrose 5%    magnesium sulfate in sterile water for injection    morphINE **OR** [DISCONTINUED] morphINE    albumin human    fentaNYL       PHYSICAL EXAM:  /63 (BP Location: Right arm)   Pulse 78   Temp 98 °F (36.7 °C) (Pulmonary Artery)   Resp 21   Ht 165.1 cm (5' 5\")   Wt 258 lb 2.5 oz (117.1 kg)   SpO2 93%   BMI 42.96 kg/m²   Vent Mode: VC/AC  FiO2 (%):  [50 %-70 %] 50 %  S RR:  [16-18] 18  S VT:  [550 mL]  550 mL  PEEP/CPAP (cm H2O):  [5 cm H20-8 cm H20] 5 cm H20  MAP (cm H2O):  [11-14] 12    CONSTITUTIONAL: intubated, sedated   HEENT: atraumatic normocephalic  MOUTH: ETT, OGT  NECK/THROAT: no JVD. Trachea midline. No obvious thyromegaly. +right swan  LUNG: vented upper clear BS b/l no wheezing, + crackles. Diminished at bases. Chest symmetric with respiratory motion. + midsternal surgical wound. + chest tube  HEART: regular rate and rhythm, no obvious murmers or gallops noted  ABD: soft non tender. + bowel sounds. No organomegaly noted  EXT: no clubbing, cyanosis, or edema noted. Pulses intact grossly  NEURO/MUSCULOSKELETAL: intubated sedated   SKIN: warm, dry. No obvious lesions noted  LYMPH: no obvious LAD      IMAGES:   CXR 11/24/24  FINDINGS:   CARDIAC/VASC: The cardiac silhouette is exaggerated by AP portable technique.  There is stable central pulmonary venous congestion.   MEDIAST/HAMLET:   No visible mass or adenopathy.   LUNGS/PLEURA: There are stable streaky opacities at the right lung base.  No pleural effusion or pneumothorax.   BONES: Sternotomy changes are again noted.   OTHER: An endotracheal tube tip projects 3.8 cm above the jennyfer.  An enteric tube again courses into the left upper quadrant.  A right internal jugular approach [place]-Dev catheter tip again projects over the pulmonary outflow tract.  A mediastinal drain tip again projects over the right suprahilar region.     CXR 11/23/24  On my read possible RML infiltrates  CONCLUSION: Post emergent acute type A aortic dissection repair.  Stable cardiomegaly.  Gross stable/satisfactory position of lines and tubes.  Mild pulmonary vascular congestion.       CXR 11/22/24  CONCLUSION: Post feeding tube placement with tip in the distal esophagus approximately 4 cm above the gastroesophageal junction.  Recommend advancement of the tube by approximately 10 cm to place it in the stomach.     CXR 11/22/24  CARDIAC/MEDIASTINUM: The cardiac silhouette is  enlarged and unchanged.. There is a right internal jugular [place]-Dev catheter with tip at the level of the main pulmonary artery. There is a right-sided chest tube. Endotracheal tube with tip approximately    5.3 cm above the jennyfer. There is a nasogastric tube with tip and sidehole within the stomach and below the diaphragm. There are median sternotomy wires and postoperative changes of ascending aortic repair.   LUNGS: There is pulmonary vascular redistribution without edema. Minimal blunting of the bilateral costophrenic angles may be secondary to trace pleural effusions versus chronic pleural thickening. There is mild bibasilar atelectasis. No pneumothorax.   BONES: There is degenerative disease of the thoracic spine.     Chest CTA 11/22/24  CONCLUSION:   1. Type a aortic dissection beginning just above right renal (correction:  Right coronary)  artery and extending distally into the left common iliac artery and external iliac.  Findings were called to Dr. Echavarria at 5:52 p.m.       LABS:  Recent Labs   Lab 11/21/24  1720 11/22/24  0133 11/22/24  1015 11/23/24  0418 11/24/24  0430   RBC 4.58   < > 3.93 3.56* 3.14*   HGB 12.2   < > 10.6* 9.6* 8.5*   HCT 38.3   < > 32.7* 29.9* 26.1*   MCV 83.6   < > 83.2 84.0 83.1   MCH 26.6   < > 27.0 27.0 27.1   MCHC 31.9   < > 32.4 32.1 32.6   RDW 15.9*   < > 16.0* 16.4* 16.3*   NEPRELIM 3.70  --   --  11.86*  --    WBC 6.4   < > 10.3 13.4* 9.3   .0   < > 171.0 127.0* 106.0*    < > = values in this interval not displayed.       Recent Labs   Lab 11/21/24  1720 11/22/24  0251 11/22/24  1015 11/23/24  0418 11/24/24  0430   *   < > 180* 142* 132*   BUN 21   < > 24* 32* 32*   CREATSERUM 1.19*   < > 1.60* 2.28* 1.38*   EGFRCR 52*   < > 36* 24* 44*   CA 9.6   < > 8.8 8.4* 8.3*   ALB 4.3  --   --  3.4 3.4   *   < > 143 144 144   K 3.4*   < > 4.0 4.2 3.3*      < > 112 112 111   CO2 28.0   < > 20.0* 21.0 23.0   ALKPHO 90  --   --  49*  --    AST 27  --   --  33   --    ALT 22  --   --  9*  --    BILT 0.4  --   --  0.3  --    TP 6.9  --   --  5.3*  --     < > = values in this interval not displayed.       ASSESSMENT/PLAN:  Type A aortic dissection s/p repair now intubated in ICU  -on gtts (ntg, cleviprex, dobutamine)  -chest tube management as per CV    Post op acute respiratory failure  -complicated by self extubation and reimergent intubation and sign emesis concerning for aspiration PNA vs pneumonitis  -started on zosyn-continue  -attempted SBT this am but pt felt short of breath and complained of pain. + cuff leak per RT  -keep intubated on full MV support today and retry SBT and possible extubation tomorrow  -PRN ABG and CXR  -continue propofol, precedex, PRN fentanyl    Previous hx of smoking and ETOH  -CIWA once extubated or close to being ready  -continue duonebs PRN    NAIMA on CKD  -likely from surgery  -monitor UO and renal labs both are improving   -renal following     Proph:  -DVT: hep sq    Dispo:  -full code  -discussed with CV surgery     Critical care time 30 min independent of procedures      Thank you for the opportunity to care for Alisha Wilde.      SIDDHARTH Rainey DO, MPH  Pulmonary Critical Care Medicine  [place] [facility]                       [1]   Allergies  Allergen Reactions    Atorvastatin MYALGIA    Pravastatin MYALGIA    Rosuvastatin MYALGIA    Seasonal Runny nose   [2]   Outpatient Medications Marked as Taking for the 11/21/24 encounter (Hospital Encounter)   Medication Sig Dispense Refill    Acetaminophen ER (TYLENOL 8 HOUR) 650 MG Oral Tab CR Take 2 tablets (1,300 mg total) by mouth daily as needed.      Calcium Carb-Cholecalciferol 600-10 MG-MCG Oral Tab Take 1 tablet by mouth daily.      metoprolol succinate ER 50 MG Oral Tablet 24 Hr Take 1 tablet (50 mg total) by mouth daily. 90 tablet 3    Losartan Potassium-HCTZ 100-12.5 MG Oral Tab Take 1 tablet by mouth daily. 90 tablet 3    Potassium Chloride ER 20  MEQ Oral Tab CR Take 1 tablet by mouth daily. 90 tablet 3    albuterol 108 (90 Base) MCG/ACT Inhalation Aero Soln Inhale 2 puffs into the lungs every 4 (four) hours as needed for Wheezing. 3 each 3    amLODIPine 10 MG Oral Tab Take 1 tablet (10 mg total) by mouth daily. 90 tablet 3    Ascorbic Acid (VITAMIN C) 1000 MG Oral Tab Take 1 tablet (1,000 mg total) by mouth daily.      vitamin B-12 50 MCG Oral Tab Take 1 tablet (50 mcg total) by mouth daily.

## 2024-11-24 NOTE — PLAN OF CARE
Problem: Diabetes/Glucose Control  Goal: Glucose maintained within prescribed range  Description: INTERVENTIONS:  - Monitor Blood Glucose as ordered  - Assess for signs and symptoms of hyperglycemia and hypoglycemia  - Administer ordered medications to maintain glucose within target range  - Assess barriers to adequate nutritional intake and initiate nutrition consult as needed  - Instruct patient on self management of diabetes  Outcome: Progressing     Problem: CARDIOVASCULAR - ADULT  Goal: Maintains optimal cardiac output and hemodynamic stability  Description: INTERVENTIONS:  - Monitor vital signs, rhythm, and trends  - Monitor for bleeding, hypotension and signs of decreased cardiac output  - Evaluate effectiveness of vasoactive medications to optimize hemodynamic stability  - Monitor arterial and/or venous puncture sites for bleeding and/or hematoma  - Assess quality of pulses, skin color and temperature  - Assess for signs of decreased coronary artery perfusion - ex. Angina  - Evaluate fluid balance, assess for edema, trend weights  Outcome: Progressing  Goal: Absence of cardiac arrhythmias or at baseline  Description: INTERVENTIONS:  - Continuous cardiac monitoring, monitor vital signs, obtain 12 lead EKG if indicated  - Evaluate effectiveness of antiarrhythmic and heart rate control medications as ordered  - Initiate emergency measures for life threatening arrhythmias  - Monitor electrolytes and administer replacement therapy as ordered  Outcome: Progressing     Problem: GASTROINTESTINAL - ADULT  Goal: Minimal or absence of nausea and vomiting  Description: INTERVENTIONS:  - Maintain adequate hydration with IV or PO as ordered and tolerated  - Nasogastric tube to low intermittent suction as ordered  - Evaluate effectiveness of ordered antiemetic medications  - Provide nonpharmacologic comfort measures as appropriate  - Advance diet as tolerated, if ordered  - Obtain nutritional consult as needed  -  Evaluate fluid balance  Outcome: Progressing     Problem: METABOLIC/FLUID AND ELECTROLYTES - ADULT  Goal: Glucose maintained within prescribed range  Description: INTERVENTIONS:  - Monitor Blood Glucose as ordered  - Assess for signs and symptoms of hyperglycemia and hypoglycemia  - Administer ordered medications to maintain glucose within target range  - Assess barriers to adequate nutritional intake and initiate nutrition consult as needed  - Instruct patient on self management of diabetes  Outcome: Progressing  Goal: Electrolytes maintained within normal limits  Description: INTERVENTIONS:  - Monitor labs and rhythm and assess patient for signs and symptoms of electrolyte imbalances  - Administer electrolyte replacement as ordered  - Monitor response to electrolyte replacements, including rhythm and repeat lab results as appropriate  - Fluid restriction as ordered  - Instruct patient on fluid and nutrition restrictions as appropriate  Outcome: Progressing  Goal: Hemodynamic stability and optimal renal function maintained  Description: INTERVENTIONS:  - Monitor labs and assess for signs and symptoms of volume excess or deficit  - Monitor intake, output and patient weight  - Monitor urine specific gravity, serum osmolarity and serum sodium as indicated or ordered  - Monitor response to interventions for patient's volume status, including labs, urine output, blood pressure (other measures as available)  - Encourage oral intake as appropriate  - Instruct patient on fluid and nutrition restrictions as appropriate  Outcome: Progressing     Problem: SKIN/TISSUE INTEGRITY - ADULT  Goal: Skin integrity remains intact  Description: INTERVENTIONS  - Assess and document risk factors for pressure ulcer development  - Assess and document skin integrity  - Monitor for areas of redness and/or skin breakdown  - Initiate interventions, skin care algorithm/standards of care as needed  Outcome: Progressing  Goal: Incision(s),  wounds(s) or drain site(s) healing without S/S of infection  Description: INTERVENTIONS:  - Assess and document risk factors for pressure ulcer development  - Assess and document skin integrity  - Assess and document dressing/incision, wound bed, drain sites and surrounding tissue  - Implement wound care per orders  - Initiate isolation precautions as appropriate  - Initiate Pressure Ulcer prevention bundle as indicated  Outcome: Progressing  Goal: Oral mucous membranes remain intact  Description: INTERVENTIONS  - Assess oral mucosa and hygiene practices  - Implement preventative oral hygiene regimen  - Implement oral medicated treatments as ordered  Outcome: Progressing     Problem: HEMATOLOGIC - ADULT  Goal: Maintains hematologic stability  Description: INTERVENTIONS  - Assess for signs and symptoms of bleeding or hemorrhage  - Monitor labs and vital signs for trends  - Administer supportive blood products/factors, fluids and medications as ordered and appropriate  - Administer supportive blood products/factors as ordered and appropriate  Outcome: Progressing  Goal: Free from bleeding injury  Description: (Example usage: patient with low platelets)  INTERVENTIONS:  - Avoid intramuscular injections, enemas and rectal medication administration  - Ensure safe mobilization of patient  - Hold pressure on venipuncture sites to achieve adequate hemostasis  - Assess for signs and symptoms of internal bleeding  - Monitor lab trends  - Patient is to report abnormal signs of bleeding to staff  - Avoid use of toothpicks and dental floss  - Use electric shaver for shaving  - Use soft bristle tooth brush  - Limit straining and forceful nose blowing  Outcome: Progressing     Problem: NEUROLOGICAL - ADULT  Goal: Achieves stable or improved neurological status  Description: INTERVENTIONS  - Assess for and report changes in neurological status  - Initiate measures to prevent increased intracranial pressure  - Maintain blood  pressure and fluid volume within ordered parameters to optimize cerebral perfusion and minimize risk of hemorrhage  - Monitor temperature, glucose, and sodium. Initiate appropriate interventions as ordered  Outcome: Progressing     Problem: Safety Risk - Non-Violent Restraints  Goal: Patient will remain free from self-harm  Description: INTERVENTIONS:  - Apply the least restrictive restraint to prevent harm  - Notify patient and family of reasons restraints applied  - Assess for any contributing factors to confusion (electrolyte disturbances, delirium, medications)  - Discontinue any unnecessary medical devices as soon as possible  - Assess the patient's physical comfort, circulation, skin condition, hydration, nutrition and elimination needs   - Reorient and redirection as needed  - Assess for the need to continue restraints  Outcome: Progressing     Problem: RESPIRATORY - ADULT  Goal: Achieves optimal ventilation and oxygenation  Description: INTERVENTIONS:  - Assess for changes in respiratory status  - Assess for changes in mentation and behavior  - Position to facilitate oxygenation and minimize respiratory effort  - Oxygen supplementation based on oxygen saturation or ABGs  - Provide Smoking Cessation handout, if applicable  - Encourage broncho-pulmonary hygiene including cough, deep breathe, Incentive Spirometry  - Assess the need for suctioning and perform as needed  - Assess and instruct to report SOB or any respiratory difficulty  - Respiratory Therapy support as indicated  - Manage/alleviate anxiety  - Monitor for signs/symptoms of CO2 retention  Outcome: Not Progressing     Problem: GASTROINTESTINAL - ADULT  Goal: Maintains or returns to baseline bowel function  Description: INTERVENTIONS:  - Assess bowel function  - Maintain adequate hydration with IV or PO as ordered and tolerated  - Evaluate effectiveness of GI medications  - Encourage mobilization and activity  - Obtain nutritional consult as  needed  - Establish a toileting routine/schedule  - Consider collaborating with pharmacy to review patient's medication profile  Outcome: Not Progressing     Problem: MUSCULOSKELETAL - ADULT  Goal: Return mobility to safest level of function  Description: INTERVENTIONS:  - Assess patient stability and activity tolerance for standing, transferring and ambulating w/ or w/o assistive devices  - Assist with transfers and ambulation using safe patient handling equipment as needed  - Ensure adequate protection for wounds/incisions during mobilization  - Obtain PT/OT consults as needed  - Advance activity as appropriate  - Communicate ordered activity level and limitations with patient/family  Outcome: Not Progressing

## 2024-11-24 NOTE — PROGRESS NOTES
Progress Note     Alisha Wilde Patient Status:  Inpatient    10/25/1965 MRN [id number]   Location [facility] 2W/SW Attending Aguila Villegas MD   Hosp Day # 2 PCP Bridger Avendano MD     Chief Complaint:   Chief Complaint   Patient presents with    Chest Pain Angina         Subjective:   S: Patient seen and examined, chart reviewed.   Tolerated SBT today. Hope to extubate  UOP increased past day, on lasix  Got albumin this morning.   Still + 6 liters    On NTG and CCB drips, occ levo and sedation with precedex and propofol.      Review of Systems:   10 point ROS completed and was negative, except for pertinent positive and negatives stated in subjective.                Objective:   Vital signs:  Temp:  [97.6 °F (36.4 °C)-98.5 °F (36.9 °C)] 97.8 °F (36.6 °C)  Pulse:  [71-90] 72  Resp:  [18-21] 18  BP: ()/(55-82) 129/75  SpO2:  [91 %-97 %] 96 %  AO: ()/(37-62) 142/60  FiO2 (%):  [50 %-60 %] 50 %    Wt Readings from Last 6 Encounters:   24 258 lb 2.5 oz (117.1 kg)   10/24/24 254 lb (115.2 kg)   24 249 lb (112.9 kg)   24 249 lb (112.9 kg)   23 249 lb (112.9 kg)   10/09/23 248 lb (112.5 kg)       Physical Exam:    General: No acute distress.   Respiratory: Clear to auscultation bilaterally. No wheezes. No rhonchi. CT in place and drains.   Cardiovascular: S1, S2. Regular rate and rhythm. No murmurs, rubs or gallops.   Abdomen: Soft, nontender, nondistended.  Positive bowel sounds. No rebound or guarding.  Neurologic: No focal neurological deficits.   Musculoskeletal: Moves all extremities.  Extremities: No edema.    Results:   Diagnostic Data:      Labs:    Labs Last 24 Hours:   BMP     CBC    Other     Na 144 Cl 111 BUN 32 Glu 132   Hb 8.5   PTT - Procal -   K 3.3 CO2 23.0 Cr 1.38   WBC 9.3 >< .0  INR - CRP -   Renal Lytes Endo    Hct 26.1   Trop - D dim -   eGFR - Ca 8.3 POC Gluc  110    LFT   pBNP - Lactic -   eGFR AA - PO4 3.3 A1c -   AST - APk - Prot -  LDL  -      Recent Labs   Lab 11/21/24  1720 11/22/24  0133 11/22/24  1015 11/23/24  0418 11/24/24  0430   RBC 4.58   < > 3.93 3.56* 3.14*   HGB 12.2   < > 10.6* 9.6* 8.5*   HCT 38.3   < > 32.7* 29.9* 26.1*   MCV 83.6   < > 83.2 84.0 83.1   MCH 26.6   < > 27.0 27.0 27.1   MCHC 31.9   < > 32.4 32.1 32.6   RDW 15.9*   < > 16.0* 16.4* 16.3*   NEPRELIM 3.70  --   --  11.86*  --    WBC 6.4   < > 10.3 13.4* 9.3   .0   < > 171.0 127.0* 106.0*    < > = values in this interval not displayed.       Lab Results   Component Value Date    INR 1.25 (H) 11/22/2024    INR 0.99 11/21/2024    INR 1.02 08/10/2023       Recent Labs   Lab 11/21/24  1720 11/22/24  0251 11/22/24  1015 11/23/24  0418 11/24/24  0430   *   < > 180* 142* 132*   BUN 21   < > 24* 32* 32*   CREATSERUM 1.19*   < > 1.60* 2.28* 1.38*   CA 9.6   < > 8.8 8.4* 8.3*   ALB 4.3  --   --  3.4 3.4   *   < > 143 144 144   K 3.4*   < > 4.0 4.2 3.3*      < > 112 112 111   CO2 28.0   < > 20.0* 21.0 23.0   ALKPHO 90  --   --  49*  --    AST 27  --   --  33  --    ALT 22  --   --  9*  --    BILT 0.4  --   --  0.3  --    TP 6.9  --   --  5.3*  --     < > = values in this interval not displayed.       No results for input(s): \"NATHALIE\", \"LIP\" in the last 168 hours.    Recent Labs   Lab 11/22/24  1015 11/23/24  0418 11/24/24  0430   MG 2.3 2.2 2.2   PHOS  --   --  3.3       No results for input(s): \"URINE\", \"CULTI\", \"BLDSMR\" in the last 168 hours.      XR CHEST AP PORTABLE  (CPT=71045)    Result Date: 11/24/2024  CONCLUSION:  1. Postoperative changes from a recent repair of a [place] type A aortic dissection.  Lines/tubes in customary positioning. 2. Stable streaky opacities at the right lung base, which could relate to atelectasis or pneumonia.   Elm-remote  Dictated by (CST): Nathanael Hernandez MD on 11/24/2024 at 7:01 AM     Finalized by (CST): Nathanael Hernandez MD on 11/24/2024 at 7:04 AM          XR CHEST AP PORTABLE  (CPT=71045)    Result Date:  11/23/2024  CONCLUSION: Post emergent acute type A aortic dissection repair.  Stable cardiomegaly.  Gross stable/satisfactory position of lines and tubes.  Mild pulmonary vascular congestion.   Dictated by (CST): Jagjit Jin MD on 11/23/2024 at 9:21 AM     Finalized by (CST): Jagjit Jin MD on 11/23/2024 at 9:26 AM          XR CHEST AP PORTABLE  (CPT=71045)    Result Date: 11/22/2024  CONCLUSION:  1. ET tube in the trachea the level of the aortic knob.  NG tube is been advanced and is now at the distal body of the stomach.  No pneumothorax.  Minimal bibasilar atelectasis.    Dictated by (CST): Adalberto Santos MD on 11/22/2024 at 2:47 PM     Finalized by (CST): Adalberto Santos MD on 11/22/2024 at 2:50 PM             Cardiac  Recent Labs   Lab 11/21/24  1720   PBNP 134*       Imaging: Imaging data reviewed in Epic.    XR CHEST AP PORTABLE  (CPT=71045)    Result Date: 11/24/2024  CONCLUSION:  1. Postoperative changes from a recent repair of a [place] type A aortic dissection.  Lines/tubes in customary positioning. 2. Stable streaky opacities at the right lung base, which could relate to atelectasis or pneumonia.   Elm-remote  Dictated by (CST): Nathanael Hernandez MD on 11/24/2024 at 7:01 AM     Finalized by (CST): Nathanael Hernandez MD on 11/24/2024 at 7:04 AM          XR CHEST AP PORTABLE  (CPT=71045)    Result Date: 11/23/2024  CONCLUSION: Post emergent acute type A aortic dissection repair.  Stable cardiomegaly.  Gross stable/satisfactory position of lines and tubes.  Mild pulmonary vascular congestion.   Dictated by (CST): Jagjit Jni MD on 11/23/2024 at 9:21 AM     Finalized by (CST): Jagjit Jin MD on 11/23/2024 at 9:26 AM          XR CHEST AP PORTABLE  (CPT=71045)    Result Date: 11/22/2024  CONCLUSION:  1. ET tube in the trachea the level of the aortic knob.  NG tube is been advanced and is now at the distal body of the stomach.  No pneumothorax.  Minimal bibasilar atelectasis.    Dictated by (CST): Tom  Adalberto YATES MD on 11/22/2024 at 2:47 PM     Finalized by (CST): Adalberto Santos MD on 11/22/2024 at 2:50 PM              Medications:    furosemide  20 mg Intravenous BID (Diuretic)    acetaminophen  1,000 mg Intravenous Q8H    heparin  5,000 Units Subcutaneous Q8H GABRIEL    sennosides  8.6 mg Oral BID    docusate sodium  100 mg Oral BID    metoprolol tartrate  25 mg Oral 2x Daily(Beta Blocker)    mupirocin  1 Application Nasal BID    chlorhexidine gluconate  15 mL Mouth/Throat BID    piperacillin-tazobactam  3.375 g Intravenous Q8H    metoclopramide  5 mg Intravenous Q6H    famotidine  20 mg Oral Daily    Or    famotidine  20 mg Intravenous Daily    aspirin  81 mg Oral Daily       Assessment & Plan:   ASSESSMENT / PLAN:     Diagnosis    Hypercholesteremia    Alcoholism (HCC)    Essential hypertension    Vitamin D deficiency    Adjustment disorder with mixed anxiety and depressed mood    Controlled type 2 diabetes mellitus without complication, without long-term current use of insulin (HCC)    Obesity (BMI 30-39.9)    Neck mass    Polyp of colon    Flat feet, bilateral    Hypokalemia    Myalgia due to statin    Age-related nuclear cataract of both eyes    Positive for microalbuminuria    Dissection of ascending aorta (HCC)         Plan:      Acute type A aortic dissection  - went from RCA to left common and external iliac arteries  - s/p emergent repair by Dr Gregg  - tolerating SBT today, hope to extubate   - pulm following  - repeat cxr in am  - wound care per surgery  - echo results pending     Acute post op respiratory failure  - with self extubation and then emergently re-intubated  - possible aspiration pneumonia after that   - started zosyn     HTN  - cleviprex/nitroglycerin - wean able     Third degree heart block  - improved with atropine      Hx of etoh abuse  - monitor     Morbid obesity with likely TANYA  - BMI 42  - fu as outpt     NAIMA on CKD stage 2-3 - possible ATN vs contrast induced  - renal consulted  - renal  US simle cysts 7 cm, complex cyst 2 cm right kidney (Bosniak 2F cyst) recommend surveillance.   - monitor I/O  - monitor closely     Dispo: pending clinical course     Global A/P  - reviewed labs and vitals from today  - reviewed notes of the day  - cbc, bmp, mag ordered for tomorrow  - discussed need to stay in the hospital today due to above  - discussed with patient/RN and care team     MDM: High complexity- acute illness posing a threat to life. IV meds requiring close inpatient monitoring. I personally spent time on chart/note review, review of labs/imaging, discussion with patient, physical exam, discussion with staff, writing progress note, and discussion of plan of care.       >55min spent, >50% spent counseling and coordinating care in the form of educating pt/family and d/w consultants and staff. Most of the time spent discussing the above plan.   Plan of care discussed with nurse    35 minutes of critical care time spent       Aguila Villegas MD, FAAP, FACP  Formerly [facility] Hospitalist  I respond to Epic Chat and AMS Connect

## 2024-11-24 NOTE — RESPIRATORY THERAPY NOTE
Pt received intubated with 7.5 ETT secured at 25 cm secured at the lip. Current vent settings-       11/24/24 0311   Vent Information   Vent Mode VC/AC   Settings   FiO2 (%) 60 %   Resp Rate (Set) 18   Vt (Set, mL) 550 mL   Waveform Decelerating ramp   PEEP/CPAP (cm H2O) (S)  5 cm H20     Suction provided as needed. Bilateral b/s auscultated. RT will continue to monitor.

## 2024-11-24 NOTE — PROGRESS NOTES
[facility]  part of [facility]    Progress Note    Alisha Wilde Patient Status:  Inpatient    10/25/1965 MRN [id number]   Location [facility] 2W/SW Attending Aguila Villegas MD   Hosp Day # 2 PCP Bridger Avendano MD       Subjective:   Alisha Wilde is a(n) 59 year old female who I am seeing for NAIMA    The patient is very alert and being weaned from the vent      Objective:   /67 (BP Location: Right arm)   Pulse 75   Temp 98 °F (36.7 °C) (Pulmonary Artery)   Resp 18   Ht 5' 5\" (1.651 m)   Wt 258 lb 2.5 oz (117.1 kg)   SpO2 97%   BMI 42.96 kg/m²      Intake/Output Summary (Last 24 hours) at 2024 1023  Last data filed at 2024 0901  Gross per 24 hour   Intake 3366.77 ml   Output 2853 ml   Net 513.77 ml     Wt Readings from Last 1 Encounters:   24 258 lb 2.5 oz (117.1 kg)       Exam  Gen: No acute distress, Heent: NC AT, mucous memb clear, neck supple  Pulm: Lungs clear, normal respiratory effort  CV: Heart with regular rate and rhythm, no edema  Abd: Abdomen soft, nontender, nondistended, no organomegaly, bowel sounds present  Skin: no symptoms reported  Psych: alert and oriented    Assessment and Plan:   1 - NAIMA  NAIMA is likely due to contrast exposure versus ATN postop/barotrauma from the labile blood pressures.    Creatinine has recovered     Continue supportive care.  Proteinuria has recovered as well     2 -aortic dissection  Per CV surgery.     3 - HTN with CKD  The patient has very labile blood pressures that are being managed by Levophed, dobutamine, and Cleviprex     4 -renal cyst  The renal ultrasound that was done yesterday showed a mildly complex cyst in the right kidney.  This will need to be followed up with surveillance ultrasound              Results:     Recent Labs   Lab 24  1720 24  0133 24  1015 24  0418 24  0430   RBC 4.58   < > 3.93 3.56* 3.14*   HGB 12.2   < > 10.6* 9.6* 8.5*   HCT 38.3   < > 32.7*  29.9* 26.1*   MCV 83.6   < > 83.2 84.0 83.1   MCH 26.6   < > 27.0 27.0 27.1   MCHC 31.9   < > 32.4 32.1 32.6   RDW 15.9*   < > 16.0* 16.4* 16.3*   NEPRELIM 3.70  --   --  11.86*  --    WBC 6.4   < > 10.3 13.4* 9.3   .0   < > 171.0 127.0* 106.0*    < > = values in this interval not displayed.         Recent Labs   Lab 11/22/24  1015 11/23/24  0418 11/24/24  0430   * 142* 132*   BUN 24* 32* 32*   CREATSERUM 1.60* 2.28* 1.38*   CA 8.8 8.4* 8.3*    144 144   K 4.0 4.2 3.3*    112 111   CO2 20.0* 21.0 23.0          XR CHEST AP PORTABLE  (CPT=71045)    Result Date: 11/24/2024  CONCLUSION:  1. Postoperative changes from a recent repair of a [place] type A aortic dissection.  Lines/tubes in customary positioning. 2. Stable streaky opacities at the right lung base, which could relate to atelectasis or pneumonia.   Elm-remote  Dictated by (CST): Nathanael Hernandez MD on 11/24/2024 at 7:01 AM     Finalized by (CST): Nathanael Hernandez MD on 11/24/2024 at 7:04 AM          XR CHEST AP PORTABLE  (CPT=71045)    Result Date: 11/23/2024  CONCLUSION: Post emergent acute type A aortic dissection repair.  Stable cardiomegaly.  Gross stable/satisfactory position of lines and tubes.  Mild pulmonary vascular congestion.   Dictated by (CST): Jagjit Jin MD on 11/23/2024 at 9:21 AM     Finalized by (CST): Jagjit Jin MD on 11/23/2024 at 9:26 AM          XR CHEST AP PORTABLE  (CPT=71045)    Result Date: 11/22/2024  CONCLUSION:  1. ET tube in the trachea the level of the aortic knob.  NG tube is been advanced and is now at the distal body of the stomach.  No pneumothorax.  Minimal bibasilar atelectasis.    Dictated by (CST): Adalberto Santos MD on 11/22/2024 at 2:47 PM     Finalized by (CST): Adalberto Santos MD on 11/22/2024 at 2:50 PM          XR CHEST AP PORTABLE  (CPT=71045)    Result Date: 11/22/2024  CONCLUSION:  1. NG tube terminates at the distal esophagus and needs to be advanced approximately 15 cm into the  body of the stomach.  ET tube in the trachea the level the clavicles.  No pneumothorax.  Linear bibasilar atelectasis.    Dictated by (CST): Adalberto Santos MD on 11/22/2024 at 1:02 PM     Finalized by (CST): Adalberto Santos MD on 11/22/2024 at 1:05 PM          XR CHEST AP PORTABLE  (CPT=71045)    Result Date: 11/22/2024  CONCLUSION: Post feeding tube placement with tip in the distal esophagus approximately 4 cm above the gastroesophageal junction.  Recommend advancement of the tube by approximately 10 cm to place it in the stomach.   Dictated by (CST): Jagjit Jin MD on 11/22/2024 at 11:53 AM     Finalized by (CST): Jagjit Jin MD on 11/22/2024 at 11:55 AM               ZAY LINARES MD  11/24/2024

## 2024-11-25 ENCOUNTER — APPOINTMENT (OUTPATIENT)
Dept: ULTRASOUND IMAGING | Facility: HOSPITAL | Age: 59
DRG: 003 | End: 2024-11-25
Attending: NURSE PRACTITIONER
Payer: COMMERCIAL

## 2024-11-25 ENCOUNTER — APPOINTMENT (OUTPATIENT)
Dept: GENERAL RADIOLOGY | Facility: HOSPITAL | Age: 59
DRG: 003 | End: 2024-11-25
Attending: NURSE PRACTITIONER
Payer: COMMERCIAL

## 2024-11-25 ENCOUNTER — APPOINTMENT (OUTPATIENT)
Dept: GENERAL RADIOLOGY | Facility: HOSPITAL | Age: 59
End: 2024-11-25
Attending: NURSE PRACTITIONER
Payer: COMMERCIAL

## 2024-11-25 ENCOUNTER — APPOINTMENT (OUTPATIENT)
Dept: ULTRASOUND IMAGING | Facility: HOSPITAL | Age: 59
End: 2024-11-25
Attending: NURSE PRACTITIONER
Payer: COMMERCIAL

## 2024-11-25 LAB
ALBUMIN SERPL-MCNC: 3.5 G/DL (ref 3.2–4.8)
ALBUMIN/GLOB SERPL: 1.8 {RATIO} (ref 1–2)
ALP LIVER SERPL-CCNC: 42 U/L
ALT SERPL-CCNC: <7 U/L
AMYLASE SERPL-CCNC: 99 U/L (ref 30–118)
ANION GAP SERPL CALC-SCNC: 9 MMOL/L (ref 0–18)
ANION GAP SERPL CALC-SCNC: 9 MMOL/L (ref 0–18)
AST SERPL-CCNC: 20 U/L (ref ?–34)
ATRIAL RATE: 79 BPM
BASE EXCESS BLD CALC-SCNC: -1.4 MMOL/L (ref ?–2)
BASOPHILS # BLD AUTO: 0.03 X10(3) UL (ref 0–0.2)
BASOPHILS NFR BLD AUTO: 0.3 %
BILIRUB SERPL-MCNC: 0.4 MG/DL (ref 0.2–1.1)
BLOOD TYPE BARCODE: 7300
BUN BLD-MCNC: 26 MG/DL (ref 9–23)
BUN BLD-MCNC: 29 MG/DL (ref 9–23)
BUN/CREAT SERPL: 22.6 (ref 10–20)
BUN/CREAT SERPL: 25.7 (ref 10–20)
CA-I BLD-SCNC: 1.08 MMOL/L (ref 0.95–1.32)
CALCIUM BLD-MCNC: 8.4 MG/DL (ref 8.7–10.4)
CALCIUM BLD-MCNC: 8.4 MG/DL (ref 8.7–10.4)
CHLORIDE SERPL-SCNC: 111 MMOL/L (ref 98–112)
CHLORIDE SERPL-SCNC: 111 MMOL/L (ref 98–112)
CO2 SERPL-SCNC: 23 MMOL/L (ref 21–32)
CO2 SERPL-SCNC: 23 MMOL/L (ref 21–32)
COHGB MFR BLD: 2.8 % (ref 0–3)
CREAT BLD-MCNC: 1.13 MG/DL
CREAT BLD-MCNC: 1.15 MG/DL
DEPRECATED RDW RBC AUTO: 50.4 FL (ref 35.1–46.3)
EGFRCR SERPLBLD CKD-EPI 2021: 54 ML/MIN/1.73M2 (ref 60–?)
EGFRCR SERPLBLD CKD-EPI 2021: 55 ML/MIN/1.73M2 (ref 60–?)
EOSINOPHIL # BLD AUTO: 0.24 X10(3) UL (ref 0–0.7)
EOSINOPHIL NFR BLD AUTO: 2.6 %
ERYTHROCYTE [DISTWIDTH] IN BLOOD BY AUTOMATED COUNT: 16.1 % (ref 11–15)
GLOBULIN PLAS-MCNC: 1.9 G/DL (ref 2–3.5)
GLUCOSE BLD-MCNC: 119 MG/DL (ref 70–99)
GLUCOSE BLD-MCNC: 137 MG/DL (ref 70–99)
GLUCOSE BLDC GLUCOMTR-MCNC: 101 MG/DL (ref 70–99)
GLUCOSE BLDC GLUCOMTR-MCNC: 104 MG/DL (ref 70–99)
GLUCOSE BLDC GLUCOMTR-MCNC: 109 MG/DL (ref 70–99)
GLUCOSE BLDC GLUCOMTR-MCNC: 110 MG/DL (ref 70–99)
GLUCOSE BLDC GLUCOMTR-MCNC: 116 MG/DL (ref 70–99)
GLUCOSE BLDC GLUCOMTR-MCNC: 117 MG/DL (ref 70–99)
GLUCOSE BLDC GLUCOMTR-MCNC: 121 MG/DL (ref 70–99)
GLUCOSE BLDC GLUCOMTR-MCNC: 121 MG/DL (ref 70–99)
GLUCOSE BLDC GLUCOMTR-MCNC: 122 MG/DL (ref 70–99)
GLUCOSE BLDC GLUCOMTR-MCNC: 123 MG/DL (ref 70–99)
GLUCOSE BLDC GLUCOMTR-MCNC: 123 MG/DL (ref 70–99)
GLUCOSE BLDC GLUCOMTR-MCNC: 124 MG/DL (ref 70–99)
GLUCOSE BLDC GLUCOMTR-MCNC: 125 MG/DL (ref 70–99)
GLUCOSE BLDC GLUCOMTR-MCNC: 126 MG/DL (ref 70–99)
GLUCOSE BLDC GLUCOMTR-MCNC: 132 MG/DL (ref 70–99)
GLUCOSE BLDC GLUCOMTR-MCNC: 132 MG/DL (ref 70–99)
GLUCOSE BLDC GLUCOMTR-MCNC: 133 MG/DL (ref 70–99)
GLUCOSE BLDC GLUCOMTR-MCNC: 141 MG/DL (ref 70–99)
GLUCOSE BLDC GLUCOMTR-MCNC: 94 MG/DL (ref 70–99)
HCO3 BLDA-SCNC: 23.8 MEQ/L (ref 21–27)
HCT VFR BLD AUTO: 26.4 %
HEPARIN AB PPP QL PL AGG: NEGATIVE
HGB BLD-MCNC: 8.3 G/DL
HGB BLD-MCNC: 9 G/DL
IMM GRANULOCYTES # BLD AUTO: 0.11 X10(3) UL (ref 0–1)
IMM GRANULOCYTES NFR BLD: 1.2 %
ISTAT ACTIVATED CLOTTING TIME: 140 SECONDS (ref 125–137)
LACTATE BLD-SCNC: 0.9 MMOL/L (ref 0.5–2)
LACTATE SERPL-SCNC: 0.8 MMOL/L (ref 0.5–2)
LIPASE SERPL-CCNC: 28 U/L (ref 12–53)
LYMPHOCYTES # BLD AUTO: 1.15 X10(3) UL (ref 1–4)
LYMPHOCYTES NFR BLD AUTO: 12.2 %
MAGNESIUM SERPL-MCNC: 2.2 MG/DL (ref 1.6–2.6)
MCH RBC QN AUTO: 26.6 PG (ref 26–34)
MCHC RBC AUTO-ENTMCNC: 31.4 G/DL (ref 31–37)
MCV RBC AUTO: 84.6 FL
METHGB MFR BLD: 1 % SAT (ref 0.4–1.5)
MONOCYTES # BLD AUTO: 0.87 X10(3) UL (ref 0.1–1)
MONOCYTES NFR BLD AUTO: 9.3 %
NEUTROPHILS # BLD AUTO: 7 X10 (3) UL (ref 1.5–7.7)
NEUTROPHILS # BLD AUTO: 7 X10(3) UL (ref 1.5–7.7)
NEUTROPHILS NFR BLD AUTO: 74.4 %
O2 CT BLD-SCNC: 12.4 VOL% (ref 15–23)
O2/TOTAL GAS SETTING VFR VENT: 40 %
OSMOLALITY SERPL CALC.SUM OF ELEC: 302 MOSM/KG (ref 275–295)
OSMOLALITY SERPL CALC.SUM OF ELEC: 304 MOSM/KG (ref 275–295)
P AXIS: 64 DEGREES
P-R INTERVAL: 182 MS
PCO2 BLDA: 33 MM HG (ref 35–45)
PEEP SETTING VENT: 5 CM H2O
PH BLDA: 7.44 [PH] (ref 7.35–7.45)
PHOSPHATE SERPL-MCNC: 3.1 MG/DL (ref 2.4–5.1)
PLATELET # BLD AUTO: 102 10(3)UL (ref 150–450)
PO2 BLDA: 105 MM HG (ref 80–100)
POTASSIUM BLD-SCNC: 3.8 MMOL/L (ref 3.6–5.1)
POTASSIUM SERPL-SCNC: 3.3 MMOL/L (ref 3.5–5.1)
POTASSIUM SERPL-SCNC: 3.7 MMOL/L (ref 3.5–5.1)
PROT SERPL-MCNC: 5.4 G/DL (ref 5.7–8.2)
PUNCTURE CHARGE: NO
Q-T INTERVAL: 404 MS
QRS DURATION: 82 MS
QTC CALCULATION (BEZET): 463 MS
R AXIS: 27 DEGREES
RBC # BLD AUTO: 3.12 X10(6)UL
RESP RATE: 18 BPM
SAO2 % BLDA: >99 % (ref 94–100)
SODIUM BLD-SCNC: 137 MMOL/L (ref 135–145)
SODIUM SERPL-SCNC: 143 MMOL/L (ref 136–145)
SODIUM SERPL-SCNC: 143 MMOL/L (ref 136–145)
SPECIMEN VOL 24H UR: 550 ML
T AXIS: 35 DEGREES
UNIT VOLUME: 350 ML
VENTRICULAR RATE: 79 BPM
WBC # BLD AUTO: 9.4 X10(3) UL (ref 4–11)

## 2024-11-25 PROCEDURE — 99291 CRITICAL CARE FIRST HOUR: CPT | Performed by: INTERNAL MEDICINE

## 2024-11-25 PROCEDURE — 71045 X-RAY EXAM CHEST 1 VIEW: CPT | Performed by: NURSE PRACTITIONER

## 2024-11-25 PROCEDURE — 76700 US EXAM ABDOM COMPLETE: CPT | Performed by: NURSE PRACTITIONER

## 2024-11-25 PROCEDURE — 99233 SBSQ HOSP IP/OBS HIGH 50: CPT | Performed by: INTERNAL MEDICINE

## 2024-11-25 PROCEDURE — 74018 RADEX ABDOMEN 1 VIEW: CPT | Performed by: NURSE PRACTITIONER

## 2024-11-25 RX ORDER — FUROSEMIDE 10 MG/ML
40 INJECTION INTRAMUSCULAR; INTRAVENOUS 3 TIMES DAILY
Status: DISCONTINUED | OUTPATIENT
Start: 2024-11-25 | End: 2024-11-26

## 2024-11-25 RX ORDER — FUROSEMIDE 10 MG/ML
40 INJECTION INTRAMUSCULAR; INTRAVENOUS 3 TIMES DAILY
Status: DISCONTINUED | OUTPATIENT
Start: 2024-11-25 | End: 2024-11-25

## 2024-11-25 RX ORDER — ACETYLCYSTEINE 200 MG/ML
2 SOLUTION ORAL; RESPIRATORY (INHALATION)
Status: DISCONTINUED | OUTPATIENT
Start: 2024-11-25 | End: 2024-11-26

## 2024-11-25 RX ORDER — FUROSEMIDE 10 MG/ML
40 INJECTION INTRAMUSCULAR; INTRAVENOUS
Status: DISCONTINUED | OUTPATIENT
Start: 2024-11-25 | End: 2024-11-25

## 2024-11-25 RX ORDER — METOCLOPRAMIDE HYDROCHLORIDE 5 MG/ML
10 INJECTION INTRAMUSCULAR; INTRAVENOUS EVERY 6 HOURS
Status: DISCONTINUED | OUTPATIENT
Start: 2024-11-25 | End: 2024-11-29

## 2024-11-25 RX ORDER — ALBUTEROL SULFATE 0.83 MG/ML
2.5 SOLUTION RESPIRATORY (INHALATION)
Status: DISCONTINUED | OUTPATIENT
Start: 2024-11-25 | End: 2024-12-09

## 2024-11-25 RX ORDER — IPRATROPIUM BROMIDE AND ALBUTEROL SULFATE 2.5; .5 MG/3ML; MG/3ML
3 SOLUTION RESPIRATORY (INHALATION) EVERY 6 HOURS PRN
Status: DISCONTINUED | OUTPATIENT
Start: 2024-11-25 | End: 2024-12-23

## 2024-11-25 RX ORDER — FUROSEMIDE 10 MG/ML
INJECTION INTRAMUSCULAR; INTRAVENOUS
Status: COMPLETED
Start: 2024-11-25 | End: 2024-11-25

## 2024-11-25 RX ORDER — ACETYLCYSTEINE 200 MG/ML
SOLUTION ORAL; RESPIRATORY (INHALATION)
Status: DISPENSED
Start: 2024-11-25 | End: 2024-11-26

## 2024-11-25 RX ORDER — ALBUTEROL SULFATE 0.83 MG/ML
SOLUTION RESPIRATORY (INHALATION)
Status: COMPLETED
Start: 2024-11-25 | End: 2024-11-25

## 2024-11-25 RX ORDER — AMLODIPINE BESYLATE 5 MG/1
5 TABLET ORAL DAILY
Status: DISCONTINUED | OUTPATIENT
Start: 2024-11-25 | End: 2024-11-27

## 2024-11-25 RX ORDER — ALBUTEROL SULFATE 0.83 MG/ML
SOLUTION RESPIRATORY (INHALATION)
Status: DISPENSED
Start: 2024-11-25 | End: 2024-11-26

## 2024-11-25 NOTE — PROGRESS NOTES
[facility]  Nephrology Daily Progress Note    Alisha Wilde  [id number]  59 year old      HPI:   Alisha Wilde is a 59 year old female.  Intubated and sedated.  Increase FiO2 to 60%.        ROS:     Unable.      PHYSICAL EXAM:   Temp:  [96.9 °F (36.1 °C)-98.2 °F (36.8 °C)] 98.2 °F (36.8 °C)  Pulse:  [68-81] 81  Resp:  [17-25] 25  BP: ()/(59-85) 108/68  SpO2:  [88 %-100 %] 100 %  AO: (113-168)/(39-74) 124/39  FiO2 (%):  [45 %-60 %] 60 %  Patient Weight for the past 72 hrs:   Weight   11/25/24 0800 275 lb 9.2 oz (125 kg)       Constitutional: Intubated and sedated.   Neck/Thyroid: neck is supple without adenopathy  Lymphatic: no abnormal cervical, supraclavicular or axillary adenopathy is noted  Respiratory: normal to inspection lungs are clear to auscultation bilaterally normal respiratory effort  Cardiovascular: regular rate and rhythm no murmurs, gallups, or rubs  Abdomen: soft non-tender non-distended no organomegaly noted no masses  Musculoskeletal: full ROM all extremities good strength  no deformities  Extremities: Trace edema.    Neurological: exam appropriate for age reflexes and motor skills appropriate for age    Labs:  Lab Results   Component Value Date    WBC 9.4 11/25/2024    HGB 8.3 11/25/2024    HCT 26.4 11/25/2024    .0 11/25/2024    CREATSERUM 1.13 11/25/2024    BUN 29 11/25/2024     11/25/2024    K 3.3 11/25/2024     11/25/2024    CO2 23.0 11/25/2024     11/25/2024    CA 8.4 11/25/2024    ALB 3.5 11/25/2024    ALKPHO 42 11/25/2024    BILT 0.4 11/25/2024    TP 5.4 11/25/2024    AST 20 11/25/2024    ALT <7 11/25/2024    MG 2.2 11/25/2024    PHOS 3.1 11/25/2024     Recent Labs   Lab 11/23/24  0418 11/24/24  0430 11/25/24  0509   WBC 13.4* 9.3 9.4   HGB 9.6* 8.5* 8.3*   HCT 29.9* 26.1* 26.4*   .0* 106.0* 102.0*     Recent Labs   Lab 11/21/24  1720 11/22/24  0251 11/23/24  0418 11/24/24  0430 11/25/24  0509   *   < > 142* 132* 137*   BUN 21    < > 32* 32* 29*   CREATSERUM 1.19*   < > 2.28* 1.38* 1.13*   CA 9.6   < > 8.4* 8.3* 8.4*   ALB 4.3  --  3.4 3.4 3.5   *   < > 144 144 143   K 3.4*   < > 4.2 3.3* 3.3*      < > 112 111 111   CO2 28.0   < > 21.0 23.0 23.0   ALKPHO 90  --  49*  --  42*   AST 27  --  33  --  20   ALT 22  --  9*  --  <7*   BILT 0.4  --  0.3  --  0.4   TP 6.9  --  5.3*  --  5.4*   PHOS  --   --   --  3.3 3.1    < > = values in this interval not displayed.       Imaging  XR CHEST AP PORTABLE  (CPT=71045)    Result Date: 11/24/2024  CONCLUSION:  1. Postoperative changes from a recent repair of a [place] type A aortic dissection.  Lines/tubes in customary positioning. 2. Stable streaky opacities at the right lung base, which could relate to atelectasis or pneumonia.   Elm-remote  Dictated by (CST): Nathanael Hernandez MD on 11/24/2024 at 7:01 AM     Finalized by (CST): Nathanael Hernandez MD on 11/24/2024 at 7:04 AM          XR CHEST AP PORTABLE  (CPT=71045)    Result Date: 11/23/2024  CONCLUSION: Post emergent acute type A aortic dissection repair.  Stable cardiomegaly.  Gross stable/satisfactory position of lines and tubes.  Mild pulmonary vascular congestion.   Dictated by (CST): Jagjit Jin MD on 11/23/2024 at 9:21 AM     Finalized by (CST): Jagjit Jin MD on 11/23/2024 at 9:26 AM          XR CHEST AP PORTABLE  (CPT=71045)    Result Date: 11/22/2024  CONCLUSION:  1. ET tube in the trachea the level of the aortic knob.  NG tube is been advanced and is now at the distal body of the stomach.  No pneumothorax.  Minimal bibasilar atelectasis.    Dictated by (CST): Adalberto Santos MD on 11/22/2024 at 2:47 PM     Finalized by (CST): Adalberto Santos MD on 11/22/2024 at 2:50 PM          XR CHEST AP PORTABLE  (CPT=71045)    Result Date: 11/22/2024  CONCLUSION:  1. NG tube terminates at the distal esophagus and needs to be advanced approximately 15 cm into the body of the stomach.  ET tube in the trachea the level the clavicles.  No  pneumothorax.  Linear bibasilar atelectasis.    Dictated by (CST): Adalberto Santos MD on 11/22/2024 at 1:02 PM     Finalized by (CST): Adalberto Santos MD on 11/22/2024 at 1:05 PM          XR CHEST AP PORTABLE  (CPT=71045)    Result Date: 11/22/2024  CONCLUSION: Post feeding tube placement with tip in the distal esophagus approximately 4 cm above the gastroesophageal junction.  Recommend advancement of the tube by approximately 10 cm to place it in the stomach.   Dictated by (CST): Jagjit Jin MD on 11/22/2024 at 11:53 AM     Finalized by (CST): Jagjit Jin MD on 11/22/2024 at 11:55 AM             Medications:    Current Facility-Administered Medications:     furosemide (Lasix) 10 mg/mL injection 40 mg, 40 mg, Intravenous, BID (Diuretic)    amLODIPine (Norvasc) tab 5 mg, 5 mg, Oral, Daily    ipratropium-albuterol (Duoneb) 0.5-2.5 (3) MG/3ML inhalation solution 3 mL, 3 mL, Nebulization, Q6H PRN    albuterol (Ventolin) (2.5 MG/3ML) 0.083% nebulizer solution, , ,     metoclopramide (Reglan) 5 mg/mL injection 10 mg, 10 mg, Intravenous, Q6H    docusate (Colace) 50 MG/5ML oral solution 100 mg, 100 mg, Oral, BID    HYDROcodone-acetaminophen (Norco) 5-325 MG per tab 2 tablet, 2 tablet, Oral, Q4H PRN    heparin (Porcine) 5000 UNIT/ML injection 5,000 Units, 5,000 Units, Subcutaneous, Q8H GABRIEL    melatonin tab 3 mg, 3 mg, Oral, Nightly PRN    sennosides (Senokot) tab 8.6 mg, 8.6 mg, Oral, BID    polyethylene glycol (PEG 3350) (Miralax) 17 g oral packet 17 g, 17 g, Oral, Daily PRN    bisacodyl (Dulcolax) 10 MG rectal suppository 10 mg, 10 mg, Rectal, Daily PRN    ondansetron (Zofran) 4 MG/2ML injection 4 mg, 4 mg, Intravenous, Q6H PRN    dexmedeTOMIDine in sodium chloride 0.9% (Precedex) 400 mcg/100mL infusion premix, 0.2-1.5 mcg/kg/hr (Dosing Weight), Intravenous, Continuous    nitroGLYCERIN in dextrose 5% 50 mg/250mL infusion premix, 5-300 mcg/min, Intravenous, Continuous PRN    norepinephrine (Levophed) 4 mg/250mL infusion  premix, 0.5-30 mcg/min, Intravenous, Continuous PRN    metoprolol tartrate (Lopressor) tab 25 mg, 25 mg, Oral, 2x Daily(Beta Blocker)    potassium chloride 20 mEq/100mL IVPB premix 20 mEq, 20 mEq, Intravenous, PRN **OR** potassium chloride 40 mEq/100mL IVPB premix (central line) 40 mEq, 40 mEq, Intravenous, PRN    magnesium sulfate in dextrose 5% 1 g/100mL infusion premix 1 g, 1 g, Intravenous, PRN    magnesium sulfate in sterile water for injection 2 g/50mL IVPB premix 2 g, 2 g, Intravenous, PRN    mupirocin (Bactroban) 2% nasal ointment 1 Application, 1 Application, Nasal, BID    chlorhexidine gluconate (Peridex) 0.12 % oral solution 15 mL, 15 mL, Mouth/Throat, BID    dextrose 5%-sodium chloride 0.45% infusion,  mL/hr, Intravenous, Continuous    morphINE PF 2 MG/ML injection 2 mg, 2 mg, Intravenous, Q2H PRN **OR** [DISCONTINUED] morphINE PF 4 MG/ML injection 4 mg, 4 mg, Intravenous, Q2H PRN    propofol (Diprivan) 10 mg/mL infusion premix, 5-50 mcg/kg/min (Dosing Weight), Intravenous, Continuous    fentaNYL (Sublimaze) 50 mcg/mL injection 25 mcg, 25 mcg, Intravenous, Q3H PRN    piperacillin-tazobactam (Zosyn) 3.375 g in dextrose 5% 100 mL IVPB-ADDV, 3.375 g, Intravenous, Q8H    famotidine (Pepcid) tab 20 mg, 20 mg, Oral, Daily **OR** famotidine (Pepcid) 20 mg/2mL injection 20 mg, 20 mg, Intravenous, Daily    aspirin chewable tab 81 mg, 81 mg, Oral, Daily    norepinephrine (Levophed) 4 mg/250mL infusion premix, 0.5-30 mcg/min, Intravenous, Continuous    insulin regular human (Novolin R, Humulin R) 100 Units in sodium chloride 0.9% 100 mL standard infusion (100 mL), 1-40 Units/hr, Intravenous, Continuous    Allergies:  Allergies[1]    Input/Output:    Intake/Output Summary (Last 24 hours) at 11/25/2024 1126  Last data filed at 11/25/2024 1100  Gross per 24 hour   Intake 3035.05 ml   Output 2868 ml   Net 167.05 ml          ASSESSMENT/PLAN:   Assessment   Patient Active Problem List   Diagnosis     Hypercholesteremia    Alcoholism (HCC)    Essential hypertension    Vitamin D deficiency    Adjustment disorder with mixed anxiety and depressed mood    Controlled type 2 diabetes mellitus without complication, without long-term current use of insulin (HCC)    Obesity (BMI 30-39.9)    Neck mass    Polyp of colon    Flat feet, bilateral    Hypokalemia    Myalgia due to statin    Age-related nuclear cataract of both eyes    Positive for microalbuminuria    Dissection of ascending aorta (HCC)       UO 1800 ml and creatinine better down to 1.13.  K 3.3 and will replace.  Requiring increasing FiO2.  Unable to extubate today.  Wt up 17 #s and had aspiration pneumonia.  Increase Lasix to 40 mg tid. Labs in am.  Discussed with RN.              11/25/2024  Juan Franco MD               [1]   Allergies  Allergen Reactions    Atorvastatin MYALGIA    Pravastatin MYALGIA    Rosuvastatin MYALGIA    Seasonal Runny nose

## 2024-11-25 NOTE — PLAN OF CARE
Problem: Patient Centered Care  Goal: Patient preferences are identified and integrated in the patient's plan of care  Description: Interventions:  - What would you like us to know as we care for you? RIOS   - Provide timely, complete, and accurate information to patient/family  - Incorporate patient and family knowledge, values, beliefs, and cultural backgrounds into the planning and delivery of care  - Encourage patient/family to participate in care and decision-making at the level they choose  - Honor patient and family perspectives and choices  Outcome: Progressing     Problem: Diabetes/Glucose Control  Goal: Glucose maintained within prescribed range  Description: INTERVENTIONS:  - Monitor Blood Glucose as ordered  - Assess for signs and symptoms of hyperglycemia and hypoglycemia  - Administer ordered medications to maintain glucose within target range  - Assess barriers to adequate nutritional intake and initiate nutrition consult as needed  - Instruct patient on self management of diabetes  Outcome: Progressing     Problem: Patient/Family Goals  Goal: Patient/Family Long Term Goal  Description: Patient's Long Term Goal: RIOS     Interventions:  - RIOS   - See additional Care Plan goals for specific interventions  Outcome: Progressing  Goal: Patient/Family Short Term Goal  Description: Patient's Short Term Goal: RIOS     Interventions:   - RIOS   - See additional Care Plan goals for specific interventions  Outcome: Progressing     Problem: CARDIOVASCULAR - ADULT  Goal: Maintains optimal cardiac output and hemodynamic stability  Description: INTERVENTIONS:  - Monitor vital signs, rhythm, and trends  - Monitor for bleeding, hypotension and signs of decreased cardiac output  - Evaluate effectiveness of vasoactive medications to optimize hemodynamic stability  - Monitor arterial and/or venous puncture sites for bleeding and/or hematoma  - Assess quality of pulses, skin color and temperature  - Assess for signs of  decreased coronary artery perfusion - ex. Angina  - Evaluate fluid balance, assess for edema, trend weights  Outcome: Progressing  Goal: Absence of cardiac arrhythmias or at baseline  Description: INTERVENTIONS:  - Continuous cardiac monitoring, monitor vital signs, obtain 12 lead EKG if indicated  - Evaluate effectiveness of antiarrhythmic and heart rate control medications as ordered  - Initiate emergency measures for life threatening arrhythmias  - Monitor electrolytes and administer replacement therapy as ordered  Outcome: Progressing     Problem: RESPIRATORY - ADULT  Goal: Achieves optimal ventilation and oxygenation  Description: INTERVENTIONS:  - Assess for changes in respiratory status  - Assess for changes in mentation and behavior  - Position to facilitate oxygenation and minimize respiratory effort  - Oxygen supplementation based on oxygen saturation or ABGs  - Provide Smoking Cessation handout, if applicable  - Encourage broncho-pulmonary hygiene including cough, deep breathe, Incentive Spirometry  - Assess the need for suctioning and perform as needed  - Assess and instruct to report SOB or any respiratory difficulty  - Respiratory Therapy support as indicated  - Manage/alleviate anxiety  - Monitor for signs/symptoms of CO2 retention  Outcome: Progressing     Problem: GASTROINTESTINAL - ADULT  Goal: Minimal or absence of nausea and vomiting  Description: INTERVENTIONS:  - Maintain adequate hydration with IV or PO as ordered and tolerated  - Nasogastric tube to low intermittent suction as ordered  - Evaluate effectiveness of ordered antiemetic medications  - Provide nonpharmacologic comfort measures as appropriate  - Advance diet as tolerated, if ordered  - Obtain nutritional consult as needed  - Evaluate fluid balance  Outcome: Progressing  Goal: Maintains or returns to baseline bowel function  Description: INTERVENTIONS:  - Assess bowel function  - Maintain adequate hydration with IV or PO as ordered  and tolerated  - Evaluate effectiveness of GI medications  - Encourage mobilization and activity  - Obtain nutritional consult as needed  - Establish a toileting routine/schedule  - Consider collaborating with pharmacy to review patient's medication profile  Outcome: Progressing     Problem: METABOLIC/FLUID AND ELECTROLYTES - ADULT  Goal: Glucose maintained within prescribed range  Description: INTERVENTIONS:  - Monitor Blood Glucose as ordered  - Assess for signs and symptoms of hyperglycemia and hypoglycemia  - Administer ordered medications to maintain glucose within target range  - Assess barriers to adequate nutritional intake and initiate nutrition consult as needed  - Instruct patient on self management of diabetes  Outcome: Progressing  Goal: Electrolytes maintained within normal limits  Description: INTERVENTIONS:  - Monitor labs and rhythm and assess patient for signs and symptoms of electrolyte imbalances  - Administer electrolyte replacement as ordered  - Monitor response to electrolyte replacements, including rhythm and repeat lab results as appropriate  - Fluid restriction as ordered  - Instruct patient on fluid and nutrition restrictions as appropriate  Outcome: Progressing  Goal: Hemodynamic stability and optimal renal function maintained  Description: INTERVENTIONS:  - Monitor labs and assess for signs and symptoms of volume excess or deficit  - Monitor intake, output and patient weight  - Monitor urine specific gravity, serum osmolarity and serum sodium as indicated or ordered  - Monitor response to interventions for patient's volume status, including labs, urine output, blood pressure (other measures as available)  - Encourage oral intake as appropriate  - Instruct patient on fluid and nutrition restrictions as appropriate  Outcome: Progressing     Problem: SKIN/TISSUE INTEGRITY - ADULT  Goal: Skin integrity remains intact  Description: INTERVENTIONS  - Assess and document risk factors for  pressure ulcer development  - Assess and document skin integrity  - Monitor for areas of redness and/or skin breakdown  - Initiate interventions, skin care algorithm/standards of care as needed  Outcome: Progressing  Goal: Incision(s), wounds(s) or drain site(s) healing without S/S of infection  Description: INTERVENTIONS:  - Assess and document risk factors for pressure ulcer development  - Assess and document skin integrity  - Assess and document dressing/incision, wound bed, drain sites and surrounding tissue  - Implement wound care per orders  - Initiate isolation precautions as appropriate  - Initiate Pressure Ulcer prevention bundle as indicated  Outcome: Progressing  Goal: Oral mucous membranes remain intact  Description: INTERVENTIONS  - Assess oral mucosa and hygiene practices  - Implement preventative oral hygiene regimen  - Implement oral medicated treatments as ordered  Outcome: Progressing     Problem: HEMATOLOGIC - ADULT  Goal: Maintains hematologic stability  Description: INTERVENTIONS  - Assess for signs and symptoms of bleeding or hemorrhage  - Monitor labs and vital signs for trends  - Administer supportive blood products/factors, fluids and medications as ordered and appropriate  - Administer supportive blood products/factors as ordered and appropriate  Outcome: Progressing  Goal: Free from bleeding injury  Description: (Example usage: patient with low platelets)  INTERVENTIONS:  - Avoid intramuscular injections, enemas and rectal medication administration  - Ensure safe mobilization of patient  - Hold pressure on venipuncture sites to achieve adequate hemostasis  - Assess for signs and symptoms of internal bleeding  - Monitor lab trends  - Patient is to report abnormal signs of bleeding to staff  - Avoid use of toothpicks and dental floss  - Use electric shaver for shaving  - Use soft bristle tooth brush  - Limit straining and forceful nose blowing  Outcome: Progressing     Problem:  MUSCULOSKELETAL - ADULT  Goal: Return mobility to safest level of function  Description: INTERVENTIONS:  - Assess patient stability and activity tolerance for standing, transferring and ambulating w/ or w/o assistive devices  - Assist with transfers and ambulation using safe patient handling equipment as needed  - Ensure adequate protection for wounds/incisions during mobilization  - Obtain PT/OT consults as needed  - Advance activity as appropriate  - Communicate ordered activity level and limitations with patient/family  Outcome: Progressing  Goal: Maintain proper alignment of affected body part  Description: INTERVENTIONS:  - Support and protect limb and body alignment per provider's orders  - Instruct and reinforce with patient and family use of appropriate assistive device and precautions (e.g. spinal or hip dislocation precautions)  Outcome: Progressing     Problem: NEUROLOGICAL - ADULT  Goal: Achieves stable or improved neurological status  Description: INTERVENTIONS  - Assess for and report changes in neurological status  - Initiate measures to prevent increased intracranial pressure  - Maintain blood pressure and fluid volume within ordered parameters to optimize cerebral perfusion and minimize risk of hemorrhage  - Monitor temperature, glucose, and sodium. Initiate appropriate interventions as ordered  Outcome: Progressing     Problem: Safety Risk - Non-Violent Restraints  Goal: Patient will remain free from self-harm  Description: INTERVENTIONS:  - Apply the least restrictive restraint to prevent harm  - Notify patient and family of reasons restraints applied  - Assess for any contributing factors to confusion (electrolyte disturbances, delirium, medications)  - Discontinue any unnecessary medical devices as soon as possible  - Assess the patient's physical comfort, circulation, skin condition, hydration, nutrition and elimination needs   - Reorient and redirection as needed  - Assess for the need to  continue restraints  Outcome: Progressing

## 2024-11-25 NOTE — RESPIRATORY THERAPY NOTE
Patient received on vent:       11/24/24 0810   Vent Information   Vent Mode VC/AC   Settings   FiO2 (%) 50 %   Resp Rate (Set) 18   Vt (Set, mL) 550 mL   Waveform Decelerating ramp   PEEP/CPAP (cm H2O) 5 cm H20     Bilateral BS auscultated, suction provided as needed. Sbt performed today for 30 mins, patient was getting tired so switched back to full support. MD aware. Patient passed cuff leak test. RT will monitor.

## 2024-11-25 NOTE — PROGRESS NOTES
[facility]  part of [facility]    Progress Note    Alisha Wilde Patient Status:  Inpatient    10/25/1965 MRN [id number]   Location [facility] 2W/SW Attending Lizeth Buck MD   Hosp Day # 3 PCP Bridger Avendano MD     Subjective:  Remains intubated, on Precedex/propofol - awake, calm, cooperative. She currently c/o abd tenderness, specifically pointing to RLQ.  OGT with 50cc bilious drainage.  She denies CP, SOB.  Remains on low dose nitroglycerin to maintain SBP <130. No visitors at bedside currently.    Objective:  /67 (BP Location: Right arm)   Pulse 69   Temp 98 °F (36.7 °C) (Temporal)   Resp 18   Ht 5' 5\" (1.651 m)   Wt 275 lb 9.2 oz (125 kg)   SpO2 99%   BMI 45.86 kg/m²     Temp (24hrs), Av.5 °F (36.4 °C), Min:96.9 °F (36.1 °C), Max:98.2 °F (36.8 °C)  Telemetry-NSR     Intake/Output:    Intake/Output Summary (Last 24 hours) at 2024 1221  Last data filed at 2024 1100  Gross per 24 hour   Intake 3035.05 ml   Output 2868 ml   Net 167.05 ml       Wt Readings from Last 3 Encounters:   24 275 lb 9.2 oz (125 kg)   10/24/24 254 lb (115.2 kg)   24 249 lb (112.9 kg)       Allergies:  Allergies[1]    Labs:  Lab Results   Component Value Date    WBC 9.4 2024    HGB 8.3 2024    HCT 26.4 2024    .0 2024    CREATSERUM 1.13 2024    BUN 29 2024     2024    K 3.3 2024     2024    CO2 23.0 2024     2024    CA 8.4 2024    ALB 3.5 2024    ALKPHO 42 2024    BILT 0.4 2024    TP 5.4 2024    AST 20 2024    ALT <7 2024    MG 2.2 2024    PHOS 3.1 2024       Physical Exam:  Blood pressure 135/67, pulse 69, temperature 98 °F (36.7 °C), temperature source Temporal, resp. rate 18, height 5' 5\" (1.651 m), weight 275 lb 9.2 oz (125 kg), SpO2 99%, not currently breastfeeding.  General: Intubated,NAD  Neck: Unable to  assess due to body habitus/intubation/RIJ cordis/[place]  Lungs: Few scattered rhonchi ant, diminished laterally  CT -pericardial / 12-hour, pleural 60cc/12-hour-no airleak noted  Heart: RRR, S1, S2  Abdomen: mildly distended, round, tender to palpation -more RLQ, BS hypoactive, -BM, OGT 50cc/12hr bilious drainage  Extremities: Warm, dry, 1+ generalized edema  Pulses: 2+ bilat DP  Skin: sternotomy stable, Provena dressing intact  Neurological: on sedation - but awake, and is able to move all extremities, follow commands appropriately    Assessment/Plan:  S/p emergent aortic dissection repair-POD 3  Respiratory failure-remains intubated-wean vent per critical care. Cuff leak test this am, hopeful plans to extubate today.  CXR reviewed.  Continue Zosyn for suspected aspiration pneumonia.  Hemodynamically stable -SBP 130s on low dose nitroglycerin - wean off. Resume home Norvasc today, on lopressor now Dobutamine off.  Hold ARB with NAIMA./CKD. Maintain SBP less than 130, MAP 70. HTN Mgmt per cards.  Continue ICU status   Pain meds as needed, fentanyl IV/MSO4 as needed  DVT prevention - SCDs/heparin  Thrombocytopenia - plt down trending, 102K today- likely consumptive - but send HIPA today  Abd tenderness - flush OGT, KUB. Suspect ileus - continue Regaln  Expected postop anemia - mild/stable, Hgb 8.3 -likely dilutional component, no RBC today -continue to monitor, CBC in am, once extubated/and has BM-will start iron  NAIMA -resolving -expected postop volume overload -appreciate nephrology consult, CRT improved 1.13 today, good UO. Increase diuresis 40mg IV bid, Net + 6.7L since OR. Max concentrate gtts, Renal dose medications, avoid nephrotoxic agents. Daily wts, BMP in am  Hypokalemia/Hypomagnesemia as expected w/diuresis - replete K/Mag per protocol  Insulin coverage per protocol -no history DM preop, anticipate transitioning off insulin drip once extubated.  ETOH use Hx-CIWA protocol    DC planning-patient lives alone, has  supportive family.  Anticipate DC home once medically stable.  Appreciate SW/CM to assist with DC planning    Plan of care discussed with pt, bedside RN and CV Surgeon: Dr. Marysol Randle, APRN  11/25/24  9529    Addendum: 1200  Patient complaining of increased RUQ abdominal pain, tender to palpation, abd distended/soft. minimal bilious output from OGT.  Became tachypneic, thick tan secretions from ETT.  Respiratory came did DuoNeb.  SpO2 dropped, increased FiO2 on vent to 60%, gas stable.  Increase Lasix 40 mg IV 3 times daily per nephrology.  Repeat CXR -right side worse from this a.m.  Will continue vent today, ultrasound abdomen pending.  KUB without obstruction, likely ileus.  Now CRT stable increase Reglan 10 mg every 6.  Dr. Gregg at bedside, updated on condition. Lactate/Amylase/lipase ordered, LFTs stable this a.m.       [1]   Allergies  Allergen Reactions    Atorvastatin MYALGIA    Pravastatin MYALGIA    Rosuvastatin MYALGIA    Seasonal Runny nose

## 2024-11-25 NOTE — PLAN OF CARE
Problem: Diabetes/Glucose Control  Goal: Glucose maintained within prescribed range  Description: INTERVENTIONS:  - Monitor Blood Glucose as ordered  - Assess for signs and symptoms of hyperglycemia and hypoglycemia  - Administer ordered medications to maintain glucose within target range  - Assess barriers to adequate nutritional intake and initiate nutrition consult as needed  - Instruct patient on self management of diabetes  Outcome: Progressing     Problem: CARDIOVASCULAR - ADULT  Goal: Absence of cardiac arrhythmias or at baseline  Description: INTERVENTIONS:  - Continuous cardiac monitoring, monitor vital signs, obtain 12 lead EKG if indicated  - Evaluate effectiveness of antiarrhythmic and heart rate control medications as ordered  - Initiate emergency measures for life threatening arrhythmias  - Monitor electrolytes and administer replacement therapy as ordered  Outcome: Progressing     Problem: GASTROINTESTINAL - ADULT  Goal: Minimal or absence of nausea and vomiting  Description: INTERVENTIONS:  - Maintain adequate hydration with IV or PO as ordered and tolerated  - Nasogastric tube to low intermittent suction as ordered  - Evaluate effectiveness of ordered antiemetic medications  - Provide nonpharmacologic comfort measures as appropriate  - Advance diet as tolerated, if ordered  - Obtain nutritional consult as needed  - Evaluate fluid balance  Outcome: Progressing     Problem: METABOLIC/FLUID AND ELECTROLYTES - ADULT  Goal: Glucose maintained within prescribed range  Description: INTERVENTIONS:  - Monitor Blood Glucose as ordered  - Assess for signs and symptoms of hyperglycemia and hypoglycemia  - Administer ordered medications to maintain glucose within target range  - Assess barriers to adequate nutritional intake and initiate nutrition consult as needed  - Instruct patient on self management of diabetes  Outcome: Progressing     Problem: Safety Risk - Non-Violent Restraints  Goal: Patient will  remain free from self-harm  Description: INTERVENTIONS:  - Apply the least restrictive restraint to prevent harm  - Notify patient and family of reasons restraints applied  - Assess for any contributing factors to confusion (electrolyte disturbances, delirium, medications)  - Discontinue any unnecessary medical devices as soon as possible  - Assess the patient's physical comfort, circulation, skin condition, hydration, nutrition and elimination needs   - Reorient and redirection as needed  - Assess for the need to continue restraints  Outcome: Progressing     Problem: CARDIOVASCULAR - ADULT  Goal: Maintains optimal cardiac output and hemodynamic stability  Description: INTERVENTIONS:  - Monitor vital signs, rhythm, and trends  - Monitor for bleeding, hypotension and signs of decreased cardiac output  - Evaluate effectiveness of vasoactive medications to optimize hemodynamic stability  - Monitor arterial and/or venous puncture sites for bleeding and/or hematoma  - Assess quality of pulses, skin color and temperature  - Assess for signs of decreased coronary artery perfusion - ex. Angina  - Evaluate fluid balance, assess for edema, trend weights  Outcome: Not Progressing     Problem: RESPIRATORY - ADULT  Goal: Achieves optimal ventilation and oxygenation  Description: INTERVENTIONS:  - Assess for changes in respiratory status  - Assess for changes in mentation and behavior  - Position to facilitate oxygenation and minimize respiratory effort  - Oxygen supplementation based on oxygen saturation or ABGs  - Provide Smoking Cessation handout, if applicable  - Encourage broncho-pulmonary hygiene including cough, deep breathe, Incentive Spirometry  - Assess the need for suctioning and perform as needed  - Assess and instruct to report SOB or any respiratory difficulty  - Respiratory Therapy support as indicated  - Manage/alleviate anxiety  - Monitor for signs/symptoms of CO2 retention  Outcome: Not Progressing     Problem:  GASTROINTESTINAL - ADULT  Goal: Maintains or returns to baseline bowel function  Description: INTERVENTIONS:  - Assess bowel function  - Maintain adequate hydration with IV or PO as ordered and tolerated  - Evaluate effectiveness of GI medications  - Encourage mobilization and activity  - Obtain nutritional consult as needed  - Establish a toileting routine/schedule  - Consider collaborating with pharmacy to review patient's medication profile  Outcome: Not Progressing

## 2024-11-25 NOTE — RESPIRATORY THERAPY NOTE
Patient received intubated and on ventilator 18/550/+5/60%. Bilateral breath sounds auscultated. Suction provided when indicated, thick tan secretions. Mucomyst and Alb added. RT will continue to monitor.

## 2024-11-25 NOTE — PROGRESS NOTES
Pulmonary/ICU/Critical Care Progress Note        Reason for Consultation: post op care  Referring Physician: Dr. Gregg    Seen this am.     SUBJECTIVE:  Afebrile on lower vent settings.  Complaining of abd pain.      ALLERGIES:  Allergies[1]      MEDS:  Home Medications:  Medications Taking[2]    Scheduled Medication:   furosemide  40 mg Intravenous BID (Diuretic)    amLODIPine  5 mg Oral Daily    heparin  5,000 Units Subcutaneous Q8H GABRIEL    sennosides  8.6 mg Oral BID    docusate sodium  100 mg Oral BID    metoprolol tartrate  25 mg Oral 2x Daily(Beta Blocker)    mupirocin  1 Application Nasal BID    chlorhexidine gluconate  15 mL Mouth/Throat BID    piperacillin-tazobactam  3.375 g Intravenous Q8H    metoclopramide  5 mg Intravenous Q6H    famotidine  20 mg Oral Daily    Or    famotidine  20 mg Intravenous Daily    aspirin  81 mg Oral Daily     Continuous Infusing Medication:   dexmedetomidine 0.2 mcg/kg/hr (11/25/24 0649)    nitroGLYCERIN in dextrose 5% Stopped (11/25/24 0830)    norepinephrine Stopped (11/23/24 2225)    dextrose 5%-sodium chloride 0.45% 40 mL/hr (11/25/24 0600)    propofol 30 mcg/kg/min (11/25/24 0646)    norepinephrine      insulin regular Stopped (11/25/24 0800)     PRN Medications:    HYDROcodone-acetaminophen    melatonin    polyethylene glycol (PEG 3350)    bisacodyl    ondansetron    nitroGLYCERIN in dextrose 5%    norepinephrine    potassium chloride **OR** potassium chloride    magnesium sulfate in dextrose 5%    magnesium sulfate in sterile water for injection    morphINE **OR** [DISCONTINUED] morphINE    fentaNYL       PHYSICAL EXAM:  /62 (BP Location: Right arm)   Pulse 71   Temp 98.2 °F (36.8 °C) (Temporal)   Resp 18   Ht 5' 5\" (1.651 m)   Wt 275 lb 9.2 oz (125 kg)   SpO2 96%   BMI 45.86 kg/m²   Vent Mode: VC/AC  FiO2 (%):  [45 %-50 %] 45 %  S RR:  [18] 18  S VT:  [550 mL] 550 mL  PEEP/CPAP (cm H2O):  [5 cm H20] 5 cm H20  MAP (cm H2O):  [10-14] 14    CONSTITUTIONAL:  intubated, sedated   HEENT: atraumatic normocephalic  MOUTH: ETT, OGT  NECK/THROAT: no JVD. Trachea midline. No obvious thyromegaly. +right swan  LUNG: vented upper clear BS b/l no wheezing, + crackles. Diminished at bases. Chest symmetric with respiratory motion. + midsternal surgical wound. + chest tube  HEART: regular rate and rhythm, no obvious murmers or gallops noted  ABD: soft tender right side. + bowel sounds. No organomegaly noted  EXT: no clubbing, cyanosis, or edema noted. Pulses intact grossly  NEURO/MUSCULOSKELETAL: intubated sedated   SKIN: warm, dry. No obvious lesions noted  LYMPH: no obvious LAD      IMAGES:   CXR 11/24/24  FINDINGS:   CARDIAC/VASC: The cardiac silhouette is exaggerated by AP portable technique.  There is stable central pulmonary venous congestion.   MEDIAST/HAMLET:   No visible mass or adenopathy.   LUNGS/PLEURA: There are stable streaky opacities at the right lung base.  No pleural effusion or pneumothorax.   BONES: Sternotomy changes are again noted.   OTHER: An endotracheal tube tip projects 3.8 cm above the jennyfer.  An enteric tube again courses into the left upper quadrant.  A right internal jugular approach [place]-Dev catheter tip again projects over the pulmonary outflow tract.  A mediastinal drain tip again projects over the right suprahilar region.     CXR 11/23/24  On my read possible RML infiltrates  CONCLUSION: Post emergent acute type A aortic dissection repair.  Stable cardiomegaly.  Gross stable/satisfactory position of lines and tubes.  Mild pulmonary vascular congestion.       CXR 11/22/24  CONCLUSION: Post feeding tube placement with tip in the distal esophagus approximately 4 cm above the gastroesophageal junction.  Recommend advancement of the tube by approximately 10 cm to place it in the stomach.     CXR 11/22/24  CARDIAC/MEDIASTINUM: The cardiac silhouette is enlarged and unchanged.. There is a right internal jugular [place]-Dev catheter with tip at the level of  the main pulmonary artery. There is a right-sided chest tube. Endotracheal tube with tip approximately    5.3 cm above the jennyfer. There is a nasogastric tube with tip and sidehole within the stomach and below the diaphragm. There are median sternotomy wires and postoperative changes of ascending aortic repair.   LUNGS: There is pulmonary vascular redistribution without edema. Minimal blunting of the bilateral costophrenic angles may be secondary to trace pleural effusions versus chronic pleural thickening. There is mild bibasilar atelectasis. No pneumothorax.   BONES: There is degenerative disease of the thoracic spine.     Chest CTA 11/22/24  CONCLUSION:   1. Type a aortic dissection beginning just above right renal (correction:  Right coronary)  artery and extending distally into the left common iliac artery and external iliac.  Findings were called to Dr. Echavarria at 5:52 p.m.       LABS:  Recent Labs   Lab 11/21/24  1720 11/22/24  0133 11/23/24 0418 11/24/24  0430 11/25/24  0509   RBC 4.58   < > 3.56* 3.14* 3.12*   HGB 12.2   < > 9.6* 8.5* 8.3*   HCT 38.3   < > 29.9* 26.1* 26.4*   MCV 83.6   < > 84.0 83.1 84.6   MCH 26.6   < > 27.0 27.1 26.6   MCHC 31.9   < > 32.1 32.6 31.4   RDW 15.9*   < > 16.4* 16.3* 16.1*   NEPRELIM 3.70  --  11.86*  --  7.00   WBC 6.4   < > 13.4* 9.3 9.4   .0   < > 127.0* 106.0* 102.0*    < > = values in this interval not displayed.       Recent Labs   Lab 11/21/24  1720 11/22/24  0251 11/23/24  0418 11/24/24  0430 11/25/24  0509   *   < > 142* 132* 137*   BUN 21   < > 32* 32* 29*   CREATSERUM 1.19*   < > 2.28* 1.38* 1.13*   EGFRCR 52*   < > 24* 44* 55*   CA 9.6   < > 8.4* 8.3* 8.4*   ALB 4.3  --  3.4 3.4 3.5   *   < > 144 144 143   K 3.4*   < > 4.2 3.3* 3.3*      < > 112 111 111   CO2 28.0   < > 21.0 23.0 23.0   ALKPHO 90  --  49*  --  42*   AST 27  --  33  --  20   ALT 22  --  9*  --  <7*   BILT 0.4  --  0.3  --  0.4   TP 6.9  --  5.3*  --  5.4*    < > = values  in this interval not displayed.       ASSESSMENT/PLAN:  Type A aortic dissection s/p repair now intubated in ICU  -on gtts as per CV surgery  -chest tube management as per CV    Post op acute respiratory failure  -complicated by self extubation and reimergent intubation and sign emesis concerning for aspiration PNA vs pneumonitis  -started on zosyn-continue  -attempted SBT this am but pt was tachypneic, needed frequent suctioning. + cuff leak per RT  -keep intubated on full MV support today and retry SBT tomorrow  -PRN ABG and CXR  -continue propofol, precedex, PRN fentanyl    Previous hx of smoking and ETOH  -CIWA once extubated or close to being ready  -continue duonebs PRN    NAIMA on CKD  -likely from surgery  -monitor UO and renal labs both are improving   -D5 0.45  -renal following     BG  -insulin gtt and accuchecks    Abd pain  -KUB ordered and abd US ordered    Proph:  -DVT: hep sq    Dispo:  -full code  -discussed with CV surgery     Critical care time 30 min independent of procedures      Thank you for the opportunity to care for Alisha Rainey DO, MPH  Pulmonary Critical Care Medicine  [facility]                         [1]   Allergies  Allergen Reactions    Atorvastatin MYALGIA    Pravastatin MYALGIA    Rosuvastatin MYALGIA    Seasonal Runny nose   [2]   Outpatient Medications Marked as Taking for the 11/21/24 encounter (Hospital Encounter)   Medication Sig Dispense Refill    Acetaminophen ER (TYLENOL 8 HOUR) 650 MG Oral Tab CR Take 2 tablets (1,300 mg total) by mouth daily as needed.      Calcium Carb-Cholecalciferol 600-10 MG-MCG Oral Tab Take 1 tablet by mouth daily.      metoprolol succinate ER 50 MG Oral Tablet 24 Hr Take 1 tablet (50 mg total) by mouth daily. 90 tablet 3    Losartan Potassium-HCTZ 100-12.5 MG Oral Tab Take 1 tablet by mouth daily. 90 tablet 3    Potassium Chloride ER 20 MEQ Oral Tab CR Take 1 tablet by mouth daily. 90  tablet 3    albuterol 108 (90 Base) MCG/ACT Inhalation Aero Soln Inhale 2 puffs into the lungs every 4 (four) hours as needed for Wheezing. 3 each 3    amLODIPine 10 MG Oral Tab Take 1 tablet (10 mg total) by mouth daily. 90 tablet 3    Ascorbic Acid (VITAMIN C) 1000 MG Oral Tab Take 1 tablet (1,000 mg total) by mouth daily.      vitamin B-12 50 MCG Oral Tab Take 1 tablet (50 mcg total) by mouth daily.

## 2024-11-25 NOTE — PLAN OF CARE
Problem: RESPIRATORY - ADULT  Goal: Achieves optimal ventilation and oxygenation  Description: INTERVENTIONS:  - Assess for changes in respiratory status  - Assess for changes in mentation and behavior  - Position to facilitate oxygenation and minimize respiratory effort  - Oxygen supplementation based on oxygen saturation or ABGs  - Provide Smoking Cessation handout, if applicable  - Encourage broncho-pulmonary hygiene including cough, deep breathe, Incentive Spirometry  - Assess the need for suctioning and perform as needed  - Assess and instruct to report SOB or any respiratory difficulty  - Respiratory Therapy support as indicated  - Manage/alleviate anxiety  - Monitor for signs/symptoms of CO2 retention  Outcome: Progressing     Pt remains intubated with ETT szie 7.5 at 25 cm at the lip, on full vent support. Pt is stable and saturating 98%, FiO2 wean down to 45%. Suctioned moderate amounts of thick, tan/green secretions. Bilaterally diminished breath sounds. RT will continue to monitor.    Vent settings and readings as follow:     11/25/24 0143   Vent Information   $ Vent Care / Non-Invasive Subsequent Day Yes   Is this patient on Chronic Ventilation? No   Interface Invasive   Vent Type    Vent plugged into main power? Yes   Vent ID    Vent Mode VC/AC   Settings   FiO2 (%) (S)  45 %   Resp Rate (Set) 18   Vt (Set, mL) 550 mL   Waveform Decelerating ramp   PEEP/CPAP (cm H2O) 5 cm H20   Peak Flow LPM 70   Trigger Sensitivity Flow (L/min) 3 L/min   Humidification Heat and moisture exchanger   Readings   Total RR 18   Minute Ventilation (L/min) 10.2 L/min   Expiratory Tidal Volume 564 mL   PIP Observed (cm H2O) 27 cm H2O   MAP (cm H2O) 14   I/E Ratio 1:2.9   Plateau Pressure (cm H2O) 24 cm H2O   Static Compliance (L/cm H2O) 30   Dynamic Compliance (L/cm H2O) 35 L/cm H2O   Alarms   High RR 40   Insp Pressure High (cm H2O) 50 cm H2O   MV High (L/min) 20 L/min   MV Low (L/min) 3 L/min   Apnea Interval  (sec) 20 seconds   Apnea Rate 18   Apnea Volume (mL) 550 mL      11/25/24 0145   Respiratory Assessment   Assessment Type Assess only   Respiratory Pattern Regular   Chest Assessment Chest expansion symmetrical   Cough Productive;Strong   Sputum Amount Moderate   Sputum Color Tan;Pink tinged;Green   Sputum Consistency Thick   Sputum How Obtained Endotracheal   Bilateral Breath Sounds Diminished   ETT   Placement Date/Time: 11/22/24 1430   Airway Size: 7.5 mm  Cuffed: Cuffed  Insertion attempts: 2  Technique: Rapid sequence;Video laryngoscopy;Flexible bronchoscopy;Stylet  Placement Verification: Bronchoscopy;Capnometry;Colorimetric EtCO2 device  Plac...   Secured at (cm) 25 cm   Cuff Pressure (cm H2O) 27 cm H2O   Suctioned? Y   Measured From Lips   Secured Location Left   Secured by Commercial tube campbell   Req'd equipment at bedside Bag mask   Additional Assessments   Pulse 73   Resp 18   SpO2 98 %

## 2024-11-25 NOTE — PROGRESS NOTES
Progress Note     Alisha Wilde Patient Status:  Inpatient    10/25/1965 MRN [id number]   Location [facility] 2W/SW Attending Aguila Villegas MD   Hosp Day # 3 PCP Bridger Avendano MD     Chief Complaint:   Chief Complaint   Patient presents with    Chest Pain Angina         Subjective:   S: Patient seen and examined, chart reviewed.   Off CCB drip.  Weaning off NTG drip.  Off Levo drip.  Still on Precedex and Propafol.  Abd distension and c/o discomfort.  Xray pending.       Review of Systems:   10 point ROS completed and was negative, except for pertinent positive and negatives stated in subjective.                Objective:   Vital signs:  Temp:  [96.9 °F (36.1 °C)-98.2 °F (36.8 °C)] 98 °F (36.7 °C)  Pulse:  [68-81] 69  Resp:  [17-25] 18  BP: ()/(59-85) 135/67  SpO2:  [88 %-100 %] 99 %  AO: (113-168)/(39-74) 131/53  FiO2 (%):  [45 %-60 %] 60 %    Wt Readings from Last 6 Encounters:   24 275 lb 9.2 oz (125 kg)   10/24/24 254 lb (115.2 kg)   24 249 lb (112.9 kg)   24 249 lb (112.9 kg)   23 249 lb (112.9 kg)   10/09/23 248 lb (112.5 kg)       Physical Exam:    General: No acute distress.   Respiratory: Clear to auscultation bilaterally. No wheezes. No rhonchi.  Cardiovascular: S1, S2. Regular rate and rhythm. No murmurs, rubs or gallops.   Abdomen: Soft, nontender, nondistended.  Positive bowel sounds. No rebound or guarding.  Neurologic: No focal neurological deficits.   Musculoskeletal: Moves all extremities.  Extremities: No edema.    Results:   Diagnostic Data:      Labs:    Labs Last 24 Hours:   BMP     CBC    Other     Na 143 Cl 111 BUN 29 Glu 137   Hb 8.3   PTT - Procal -   K 3.3 CO2 23.0 Cr 1.13   WBC 9.4 >< .0  INR - CRP -   Renal Lytes Endo    Hct 26.4   Trop - D dim -   eGFR - Ca 8.4 POC Gluc  133    LFT   pBNP - Lactic -   eGFR AA - PO4 3.1 A1c -   AST 20 APk 42 Prot 5.4  LDL -      Recent Labs   Lab 24  1720 24  0133 24  1968  11/24/24  0430 11/25/24  0509   RBC 4.58   < > 3.56* 3.14* 3.12*   HGB 12.2   < > 9.6* 8.5* 8.3*   HCT 38.3   < > 29.9* 26.1* 26.4*   MCV 83.6   < > 84.0 83.1 84.6   MCH 26.6   < > 27.0 27.1 26.6   MCHC 31.9   < > 32.1 32.6 31.4   RDW 15.9*   < > 16.4* 16.3* 16.1*   NEPRELIM 3.70  --  11.86*  --  7.00   WBC 6.4   < > 13.4* 9.3 9.4   .0   < > 127.0* 106.0* 102.0*    < > = values in this interval not displayed.       Lab Results   Component Value Date    INR 1.25 (H) 11/22/2024    INR 0.99 11/21/2024    INR 1.02 08/10/2023       Recent Labs   Lab 11/21/24  1720 11/22/24  0251 11/23/24  0418 11/24/24  0430 11/25/24  0509   *   < > 142* 132* 137*   BUN 21   < > 32* 32* 29*   CREATSERUM 1.19*   < > 2.28* 1.38* 1.13*   CA 9.6   < > 8.4* 8.3* 8.4*   ALB 4.3  --  3.4 3.4 3.5   *   < > 144 144 143   K 3.4*   < > 4.2 3.3* 3.3*      < > 112 111 111   CO2 28.0   < > 21.0 23.0 23.0   ALKPHO 90  --  49*  --  42*   AST 27  --  33  --  20   ALT 22  --  9*  --  <7*   BILT 0.4  --  0.3  --  0.4   TP 6.9  --  5.3*  --  5.4*    < > = values in this interval not displayed.       No results for input(s): \"NATHALIE\", \"LIP\" in the last 168 hours.    Recent Labs   Lab 11/23/24  0418 11/24/24  0430 11/25/24  0509   MG 2.2 2.2 2.2   PHOS  --  3.3 3.1       No results for input(s): \"URINE\", \"CULTI\", \"BLDSMR\" in the last 168 hours.      XR CHEST AP PORTABLE  (CPT=71045)    Result Date: 11/25/2024  CONCLUSION:  1. Bibasilar patchy opacities are present and may reflect atelectasis, with or without superimposed pneumonia.  2. Postthoracotomy chest with stable cardiomegaly.   3. Additional support tubes and lines as above.    Dictated by (CST): Eulalio Shaikh MD on 11/25/2024 at 11:45 AM     Finalized by (CST): Eulalio Shaikh MD on 11/25/2024 at 11:46 AM          XR ABDOMEN (1 VIEW) (CPT=74018)    Result Date: 11/25/2024  CONCLUSION:  1. Nonspecific nonobstructive bowel gas pattern.  2. Lesser incidental findings as above.     Dictated by (CST): Eulalio Shaikh MD on 11/25/2024 at 11:32 AM     Finalized by (CST): Eulalio Shaikh MD on 11/25/2024 at 11:34 AM          XR CHEST AP PORTABLE  (CPT=71045)    Result Date: 11/25/2024  CONCLUSION:  1. Scattered basilar reticular opacities may reflect atelectasis and/or superimposed pneumonia.  2. Postthoracotomy chest with stable cardiomegaly.  3. Additional support tubes and lines as above.    Dictated by (CST): Eulalio Shaikh MD on 11/25/2024 at 11:24 AM     Finalized by (CST): Eulalio Shaikh MD on 11/25/2024 at 11:28 AM          XR CHEST AP PORTABLE  (CPT=71045)    Result Date: 11/24/2024  CONCLUSION:  1. Postoperative changes from a recent repair of a Rogers type A aortic dissection.  Lines/tubes in customary positioning. 2. Stable streaky opacities at the right lung base, which could relate to atelectasis or pneumonia.   Elm-remote  Dictated by (CST): Nathanael Hernandez MD on 11/24/2024 at 7:01 AM     Finalized by (CST): Nathanael Hernandez MD on 11/24/2024 at 7:04 AM               Pro-Calcitonin  No results for input(s): \"PCT\" in the last 168 hours.    Cardiac  Recent Labs   Lab 11/21/24  1720   PBNP 134*       Imaging: Imaging data reviewed in [facility].    XR CHEST AP PORTABLE  (CPT=71045)    Result Date: 11/25/2024  CONCLUSION:  1. Bibasilar patchy opacities are present and may reflect atelectasis, with or without superimposed pneumonia.  2. Postthoracotomy chest with stable cardiomegaly.   3. Additional support tubes and lines as above.    Dictated by (CST): Eulalio Shaikh MD on 11/25/2024 at 11:45 AM     Finalized by (CST): Eulalio Shaikh MD on 11/25/2024 at 11:46 AM          XR ABDOMEN (1 VIEW) (CPT=74018)    Result Date: 11/25/2024  CONCLUSION:  1. Nonspecific nonobstructive bowel gas pattern.  2. Lesser incidental findings as above.    Dictated by (CST): Eulalio Shaikh MD on 11/25/2024 at 11:32 AM     Finalized by (CST): Eulalio Shaikh MD on 11/25/2024 at 11:34 AM          XR CHEST  AP PORTABLE  (CPT=71045)    Result Date: 11/25/2024  CONCLUSION:  1. Scattered basilar reticular opacities may reflect atelectasis and/or superimposed pneumonia.  2. Postthoracotomy chest with stable cardiomegaly.  3. Additional support tubes and lines as above.    Dictated by (CST): Eulalio Shaikh MD on 11/25/2024 at 11:24 AM     Finalized by (CST): Eulalio Shaikh MD on 11/25/2024 at 11:28 AM          XR CHEST AP PORTABLE  (CPT=71045)    Result Date: 11/24/2024  CONCLUSION:  1. Postoperative changes from a recent repair of a Peter type A aortic dissection.  Lines/tubes in customary positioning. 2. Stable streaky opacities at the right lung base, which could relate to atelectasis or pneumonia.   Elm-remote  Dictated by (CST): Nathanael Hernandez MD on 11/24/2024 at 7:01 AM     Finalized by (CST): Nathanael Hernandez MD on 11/24/2024 at 7:04 AM              Medications:    amLODIPine  5 mg Oral Daily    albuterol        metoclopramide  10 mg Intravenous Q6H    docusate  100 mg Oral BID    furosemide  40 mg Intravenous TID    heparin  5,000 Units Subcutaneous Q8H GABRIEL    sennosides  8.6 mg Oral BID    metoprolol tartrate  25 mg Oral 2x Daily(Beta Blocker)    mupirocin  1 Application Nasal BID    chlorhexidine gluconate  15 mL Mouth/Throat BID    piperacillin-tazobactam  3.375 g Intravenous Q8H    famotidine  20 mg Oral Daily    Or    famotidine  20 mg Intravenous Daily    aspirin  81 mg Oral Daily       Assessment & Plan:   ASSESSMENT / PLAN:     Diagnosis    Hypercholesteremia    Alcoholism (HCC)    Essential hypertension    Vitamin D deficiency    Adjustment disorder with mixed anxiety and depressed mood    Controlled type 2 diabetes mellitus without complication, without long-term current use of insulin (HCC)    Obesity (BMI 30-39.9)    Neck mass    Polyp of colon    Flat feet, bilateral    Hypokalemia    Myalgia due to statin    Age-related nuclear cataract of both eyes    Positive for microalbuminuria     Dissection of ascending aorta (HCC)         Plan:      Acute type A aortic dissection  - went from RCA to left common and external iliac arteries  - s/p emergent repair by Dr Gregg  - tolerating SBT today, hope to extubate   - pulm following  - repeat cxr in am  - wound care per surgery  - echo results still pending     Acute post op respiratory failure  - with self extubation and then emergently re-intubated  - possible aspiration pneumonia after that   - started zosyn     HTN  - cleviprex/nitroglycerin - wean able     Third degree heart block  - improved with atropine      Hx of etoh abuse  - monitor     Morbid obesity with likely TANYA  - BMI 42  - fu as outpt  - kub today to eval distension.      NAIMA on CKD stage 2-3 - possible ATN vs contrast induced  - renal consulted  - renal US simle cysts 7 cm, complex cyst 2 cm right kidney (Bosniak 2F cyst) recommend surveillance.   - monitor I/O  - monitor closely     Dispo: pending clinical course     Global A/P  - reviewed labs and vitals from today  - reviewed notes of the day  - cbc, bmp, mag ordered for tomorrow  - discussed need to stay in the hospital today due to above  - discussed with patient/RN and care team     MDM: High complexity- acute illness posing a threat to life. IV meds requiring close inpatient monitoring. I personally spent time on chart/note review, review of labs/imaging, discussion with patient, physical exam, discussion with staff, writing progress note, and discussion of plan of care.       >55min spent, >50% spent counseling and coordinating care in the form of educating pt/family and d/w consultants and staff. Most of the time spent discussing the above plan.   Plan of care discussed with nurse     35 minutes of critical care time spent       Aguila Villegas MD, FAAP, FACP  [facility]  I respond to [employer] and [employer]

## 2024-11-26 ENCOUNTER — APPOINTMENT (OUTPATIENT)
Dept: CT IMAGING | Facility: HOSPITAL | Age: 59
End: 2024-11-26
Attending: CLINICAL NURSE SPECIALIST
Payer: COMMERCIAL

## 2024-11-26 ENCOUNTER — APPOINTMENT (OUTPATIENT)
Dept: CT IMAGING | Facility: HOSPITAL | Age: 59
DRG: 003 | End: 2024-11-26
Attending: CLINICAL NURSE SPECIALIST
Payer: COMMERCIAL

## 2024-11-26 ENCOUNTER — APPOINTMENT (OUTPATIENT)
Dept: GENERAL RADIOLOGY | Facility: HOSPITAL | Age: 59
End: 2024-11-26
Attending: NURSE PRACTITIONER
Payer: COMMERCIAL

## 2024-11-26 ENCOUNTER — APPOINTMENT (OUTPATIENT)
Dept: GENERAL RADIOLOGY | Facility: HOSPITAL | Age: 59
DRG: 003 | End: 2024-11-26
Attending: NURSE PRACTITIONER
Payer: COMMERCIAL

## 2024-11-26 PROBLEM — N17.9 AKI (ACUTE KIDNEY INJURY): Status: ACTIVE | Noted: 2024-11-26

## 2024-11-26 PROBLEM — N17.9 AKI (ACUTE KIDNEY INJURY) (HCC): Status: ACTIVE | Noted: 2024-11-26

## 2024-11-26 PROBLEM — N18.30 STAGE 3 CHRONIC KIDNEY DISEASE (HCC): Status: ACTIVE | Noted: 2024-11-26

## 2024-11-26 LAB
ALBUMIN SERPL-MCNC: 3.5 G/DL (ref 3.2–4.8)
ANION GAP SERPL CALC-SCNC: 10 MMOL/L (ref 0–18)
ANION GAP SERPL CALC-SCNC: 9 MMOL/L (ref 0–18)
BUN BLD-MCNC: 24 MG/DL (ref 9–23)
BUN BLD-MCNC: 25 MG/DL (ref 9–23)
BUN/CREAT SERPL: 21.8 (ref 10–20)
BUN/CREAT SERPL: 22.3 (ref 10–20)
CALCIUM BLD-MCNC: 8.4 MG/DL (ref 8.7–10.4)
CALCIUM BLD-MCNC: 8.6 MG/DL (ref 8.7–10.4)
CHLORIDE SERPL-SCNC: 111 MMOL/L (ref 98–112)
CHLORIDE SERPL-SCNC: 112 MMOL/L (ref 98–112)
CO2 SERPL-SCNC: 22 MMOL/L (ref 21–32)
CO2 SERPL-SCNC: 22 MMOL/L (ref 21–32)
CREAT BLD-MCNC: 1.1 MG/DL
CREAT BLD-MCNC: 1.12 MG/DL
DEPRECATED RDW RBC AUTO: 50.6 FL (ref 35.1–46.3)
EGFRCR SERPLBLD CKD-EPI 2021: 56 ML/MIN/1.73M2 (ref 60–?)
EGFRCR SERPLBLD CKD-EPI 2021: 57 ML/MIN/1.73M2 (ref 60–?)
ERYTHROCYTE [DISTWIDTH] IN BLOOD BY AUTOMATED COUNT: 16.2 % (ref 11–15)
GLUCOSE BLD-MCNC: 124 MG/DL (ref 70–99)
GLUCOSE BLD-MCNC: 131 MG/DL (ref 70–99)
GLUCOSE BLDC GLUCOMTR-MCNC: 102 MG/DL (ref 70–99)
GLUCOSE BLDC GLUCOMTR-MCNC: 110 MG/DL (ref 70–99)
GLUCOSE BLDC GLUCOMTR-MCNC: 112 MG/DL (ref 70–99)
GLUCOSE BLDC GLUCOMTR-MCNC: 117 MG/DL (ref 70–99)
GLUCOSE BLDC GLUCOMTR-MCNC: 118 MG/DL (ref 70–99)
GLUCOSE BLDC GLUCOMTR-MCNC: 119 MG/DL (ref 70–99)
GLUCOSE BLDC GLUCOMTR-MCNC: 121 MG/DL (ref 70–99)
GLUCOSE BLDC GLUCOMTR-MCNC: 121 MG/DL (ref 70–99)
GLUCOSE BLDC GLUCOMTR-MCNC: 122 MG/DL (ref 70–99)
GLUCOSE BLDC GLUCOMTR-MCNC: 123 MG/DL (ref 70–99)
GLUCOSE BLDC GLUCOMTR-MCNC: 124 MG/DL (ref 70–99)
GLUCOSE BLDC GLUCOMTR-MCNC: 125 MG/DL (ref 70–99)
GLUCOSE BLDC GLUCOMTR-MCNC: 126 MG/DL (ref 70–99)
GLUCOSE BLDC GLUCOMTR-MCNC: 129 MG/DL (ref 70–99)
GLUCOSE BLDC GLUCOMTR-MCNC: 131 MG/DL (ref 70–99)
GLUCOSE BLDC GLUCOMTR-MCNC: 143 MG/DL (ref 70–99)
GLUCOSE BLDC GLUCOMTR-MCNC: 145 MG/DL (ref 70–99)
GLUCOSE BLDC GLUCOMTR-MCNC: 88 MG/DL (ref 70–99)
GLUCOSE BLDC GLUCOMTR-MCNC: 88 MG/DL (ref 70–99)
GLUCOSE BLDC GLUCOMTR-MCNC: 96 MG/DL (ref 70–99)
HCT VFR BLD AUTO: 26.5 %
HGB BLD-MCNC: 8.5 G/DL
MAGNESIUM SERPL-MCNC: 2.2 MG/DL (ref 1.6–2.6)
MCH RBC QN AUTO: 26.8 PG (ref 26–34)
MCHC RBC AUTO-ENTMCNC: 32.1 G/DL (ref 31–37)
MCV RBC AUTO: 83.6 FL
OSMOLALITY SERPL CALC.SUM OF ELEC: 300 MOSM/KG (ref 275–295)
OSMOLALITY SERPL CALC.SUM OF ELEC: 304 MOSM/KG (ref 275–295)
PHOSPHATE SERPL-MCNC: 3.3 MG/DL (ref 2.4–5.1)
PLATELET # BLD AUTO: 133 10(3)UL (ref 150–450)
PLATELETS.RETICULATED NFR BLD AUTO: 6.5 % (ref 0–7)
POTASSIUM SERPL-SCNC: 3.4 MMOL/L (ref 3.5–5.1)
POTASSIUM SERPL-SCNC: 3.4 MMOL/L (ref 3.5–5.1)
RBC # BLD AUTO: 3.17 X10(6)UL
SODIUM SERPL-SCNC: 142 MMOL/L (ref 136–145)
SODIUM SERPL-SCNC: 144 MMOL/L (ref 136–145)
WBC # BLD AUTO: 9.9 X10(3) UL (ref 4–11)

## 2024-11-26 PROCEDURE — 74176 CT ABD & PELVIS W/O CONTRAST: CPT | Performed by: CLINICAL NURSE SPECIALIST

## 2024-11-26 PROCEDURE — 99291 CRITICAL CARE FIRST HOUR: CPT | Performed by: INTERNAL MEDICINE

## 2024-11-26 PROCEDURE — 71250 CT THORAX DX C-: CPT | Performed by: CLINICAL NURSE SPECIALIST

## 2024-11-26 PROCEDURE — 99233 SBSQ HOSP IP/OBS HIGH 50: CPT | Performed by: INTERNAL MEDICINE

## 2024-11-26 PROCEDURE — 71045 X-RAY EXAM CHEST 1 VIEW: CPT | Performed by: NURSE PRACTITIONER

## 2024-11-26 RX ORDER — FUROSEMIDE 10 MG/ML
40 INJECTION INTRAMUSCULAR; INTRAVENOUS
Status: DISCONTINUED | OUTPATIENT
Start: 2024-11-27 | End: 2024-11-27

## 2024-11-26 RX ORDER — SODIUM CHLORIDE FOR INHALATION 3 %
3 VIAL, NEBULIZER (ML) INHALATION EVERY 6 HOURS SCHEDULED
Status: DISCONTINUED | OUTPATIENT
Start: 2024-11-26 | End: 2024-12-09

## 2024-11-26 NOTE — PROGRESS NOTES
[facility]  part of [facility]    Cardiology Progress Note    Alisha Wilde Patient Status:  Inpatient    10/25/1965 MRN [id number]   Location [facility] 2W/SW Attending Aguila Villegas MD   Hosp Day # 4 PCP Bridger Avendano MD     Interval History   Remains intubated and sedated    Assessment and Plan:   Acute hypoxic respiratory failure, c/b aspiration event requiring re-intubation  Type A aortic dissection  NAIMA on CKD-III, improved  Obesity  Hypertension  EtOH abuse  -presented with acute onset CP   -CT with type A aortic dissection above right coronary artery and extending distally into the left common iliac artery and external iliac   -s/p emergent successful repair on 24  -had aspiration event following self-extubation, now re-intubated  -will need to review TTE, was obtained on    -continue BP management   -amlodipine 5mg daily   -metoprolol tartrate 25mg BID  -volume management per nephrology, on lasix  -CIWA protocol  -monitor rhythm on tele    Objective:   Patient Vitals for the past 24 hrs:   BP Temp Temp src Pulse Resp SpO2 Weight   24 1500 119/66 -- -- 70 18 92 % --   24 1400 124/66 -- -- 71 18 95 % --   24 1300 122/66 -- -- 68 18 97 % --   24 1200 124/68 98.4 °F (36.9 °C) Axillary 68 18 97 % --   24 1100 127/75 -- -- 69 18 98 % --   24 1000 129/71 -- -- 69 18 97 % --   24 0900 128/69 -- -- 70 18 97 % --   24 0800 (!) 143/118 97.7 °F (36.5 °C) Temporal 70 18 98 % --   24 0700 130/70 -- -- 69 18 99 % --   24 0621 -- -- -- -- -- -- 282 lb 3 oz (128 kg)   24 0600 95/53 -- -- 68 21 97 % --   24 0500 121/67 -- -- 70 18 97 % --   24 0400 119/68 98.4 °F (36.9 °C) Temporal 72 18 98 % --   24 0200 109/60 -- -- 72 21 96 % --   24 0130 -- -- -- 72 18 97 % --   24 0100 142/73 -- -- 71 18 98 % --   24 0030 -- -- -- 71 18 99 % --   24 0000 139/70 98.3 °F (36.8 °C)  Temporal 70 18 98 % --   11/25/24 2300 130/69 -- -- 72 18 98 % --   11/25/24 2200 135/70 -- -- 71 18 98 % --   11/25/24 2100 138/71 -- -- 71 18 99 % --   11/25/24 2000 132/68 98.1 °F (36.7 °C) Temporal 69 18 99 % --   11/25/24 1900 124/64 -- -- 71 18 98 % --   11/25/24 1800 136/72 -- -- 71 18 98 % --   11/25/24 1700 138/70 -- -- 70 18 98 % --       Intake/Output:   Last 3 shifts:   Intake/Output                   11/24/24 0700 - 11/25/24 0659 11/25/24 0700 - 11/26/24 0659 11/26/24 0700 - 11/27/24 0659       Intake    P.O.  0  0  --    P.O. 0 0 --    I.V.  2276.7  1884.9  --    I.V. 154 615 --    Volume (mL) Insulin 53.9 37 --    Volume (mL) Nitroglycerin 236.9 54.5 --    Volume (mL) Dobutamine 4.5 -- --    Volume (mL) Propofol 477.8 713.9 --    Volume (mL) Dexmedetomidine 352.2 384.5 --    Volume (mL) (dextrose 5%-sodium chloride 0.45% infusion) 997.4 80 --    NG/GT  50  150  60    Intake (mL) (NG/OG Tube Orogastric 16 Fr. Right mouth) 50 150 60    IV PIGGYBACK  600  500  --    Volume (mL) (piperacillin-tazobactam (Zosyn) 3.375 g in dextrose 5% 100 mL IVPB-ADDV) 300 200 --    Volume (mL) (acetaminophen (Ofirmev) 10 mg/mL infusion premix 1,000 mg) 200 100 --    Volume (mL) (potassium chloride 20 mEq/100mL IVPB premix 20 mEq) -- 100 --    Volume (mL) (potassium chloride 40 mEq/100mL IVPB premix (central line) 40 mEq) 100 100 --    Total Intake 2926.7 2534.9 60       Output    Urine  1800  3570  800    Output (mL) (Urethral Catheter Latex;Temperature probe;Double-lumen) 1800 3570 800    Emesis/NG output  550  365  --    Residual volume (ml) (NG/OG Tube Orogastric 16 Fr. Right mouth) -- 5 --    Output (mL) (NG/OG Tube Orogastric 16 Fr. Right mouth) 550 360 --    Chest Tube  188  125  30    Output (mL) ([REMOVED] Chest Tube Anterior Mediastinal 32 Fr.) 48 50 --    Output (mL) (Chest Tube Anterior Pericardial 24 Fr.) 140 75 30    Total Output 2538 4060 830       Net I/O     388.7 -1525.1 -770             Vent Settings:  Vent Mode: VC/AC  FiO2 (%):  [40 %-50 %] 40 %  S RR:  [18] 18  S VT:  [550 mL] 550 mL  PEEP/CPAP (cm H2O):  [5 cm H20] 5 cm H20  MAP (cm H2O):  [12-14] 13    Hemodynamic parameters (last 24 hours):      Scheduled Meds:    lidocaine (cardiac)        atropine        EPINEPHrine        [START ON 11/27/2024] furosemide  40 mg Intravenous BID (Diuretic)    amLODIPine  5 mg Oral Daily    metoclopramide  10 mg Intravenous Q6H    docusate  100 mg Oral BID    albuterol  2.5 mg Nebulization 4 times per day    acetylcysteine  2 mL Nebulization 4 times per day    heparin  5,000 Units Subcutaneous Q8H GABRIEL    sennosides  8.6 mg Oral BID    metoprolol tartrate  25 mg Oral 2x Daily(Beta Blocker)    mupirocin  1 Application Nasal BID    chlorhexidine gluconate  15 mL Mouth/Throat BID    piperacillin-tazobactam  3.375 g Intravenous Q8H    famotidine  20 mg Oral Daily    Or    famotidine  20 mg Intravenous Daily    aspirin  81 mg Oral Daily       Continuous Infusions:    dexmedetomidine 0.4 mcg/kg/hr (11/26/24 1554)    nitroGLYCERIN in dextrose 5% 80 mcg/min (11/26/24 1616)    norepinephrine Stopped (11/23/24 2225)    dextrose 5%-sodium chloride 0.45% 40 mL/hr (11/25/24 0600)    propofol 45 mcg/kg/min (11/26/24 1609)    norepinephrine      insulin regular 1.5 Units/hr (11/26/24 1400)       Results:     Lab Results   Component Value Date    WBC 9.9 11/26/2024    HGB 8.5 (L) 11/26/2024    HCT 26.5 (L) 11/26/2024    .0 (L) 11/26/2024    CREATSERUM 1.12 (H) 11/26/2024    BUN 25 (H) 11/26/2024     11/26/2024    K 3.4 (L) 11/26/2024     11/26/2024    CO2 22.0 11/26/2024     (H) 11/26/2024    CA 8.6 (L) 11/26/2024    ALB 3.5 11/26/2024    ALKPHO 42 (L) 11/25/2024    BILT 0.4 11/25/2024    TP 5.4 (L) 11/25/2024    AST 20 11/25/2024    ALT <7 (L) 11/25/2024    PTT 28.2 11/22/2024    INR 1.25 (H) 11/22/2024    TSH 0.704 09/29/2024    NATHALIE 99 11/25/2024    LIP 28 11/25/2024    DDIMER 0.42 12/08/2019    ESRML 15  06/05/2021    MG 2.2 11/26/2024    PHOS 3.3 11/26/2024    TROP <0.045 12/08/2019     (H) 09/23/2021    B12 1,156 (H) 07/23/2018       Recent Labs   Lab 11/25/24  0509 11/25/24  1746 11/26/24  0546   * 119* 124*   BUN 29* 26* 25*   CREATSERUM 1.13* 1.15* 1.12*   CA 8.4* 8.4* 8.6*    143 144   K 3.3* 3.7 3.4*    111 112   CO2 23.0 23.0 22.0     Recent Labs   Lab 11/21/24  1720 11/22/24  0133 11/23/24  0418 11/24/24  0430 11/25/24  0509 11/26/24  0546   RBC 4.58   < > 3.56* 3.14* 3.12* 3.17*   HGB 12.2   < > 9.6* 8.5* 8.3* 8.5*   HCT 38.3   < > 29.9* 26.1* 26.4* 26.5*   MCV 83.6   < > 84.0 83.1 84.6 83.6   MCH 26.6   < > 27.0 27.1 26.6 26.8   MCHC 31.9   < > 32.1 32.6 31.4 32.1   RDW 15.9*   < > 16.4* 16.3* 16.1* 16.2*   NEPRELIM 3.70  --  11.86*  --  7.00  --    WBC 6.4   < > 13.4* 9.3 9.4 9.9   .0   < > 127.0* 106.0* 102.0* 133.0*    < > = values in this interval not displayed.       No results for input(s): \"BNPML\" in the last 168 hours.    No results for input(s): \"TROP\", \"CK\" in the last 168 hours.    XR CHEST AP PORTABLE  (CPT=71045)    Result Date: 11/26/2024  CONCLUSION:  1. Cardiomegaly aneurysmal thoracic aorta.  Sternotomy changes 2. ET tube 1.8 cm above the jennyfer .  Recommend 1-2 cm pullback for optimal positioning 3. Remaining support tubes and catheters are satisfactory. 4. Left perihilar bibasilar postoperative parenchymal abnormality consistent with atelectasis and/or developing consolidation.  Slight progression.  Small bilateral effusions.    Dictated by (CST): Woodrow Fischer MD on 11/26/2024 at 8:32 AM     Finalized by (CST): Woodrow Fischer MD on 11/26/2024 at 8:42 AM          US ABDOMEN COMPLETE (CPT=76700)    Result Date: 11/25/2024  CONCLUSION:  Artifactually degraded examination. Within these parameters: 1. Gallbladder sludge without sonographic evidence acute complication.  2. Nonspecific pericholecystic fluid accumulation, which may be related to the  patient's overall protein/volume status.  3. Negative for hepatobiliary dilatation.  4. Sonographic features of hepatic steatosis.  5. Minimally complex right renal cyst and left-sided peripelvic cysts.  6. Lesser incidental findings as above.    Dictated by (CST): Eulalio Shaikh MD on 11/25/2024 at 1:37 PM     Finalized by (CST): Eulalio Shaikh MD on 11/25/2024 at 1:42 PM          XR CHEST AP PORTABLE  (CPT=71045)    Result Date: 11/25/2024  CONCLUSION:  1. Bibasilar patchy opacities are present and may reflect atelectasis, with or without superimposed pneumonia.  2. Postthoracotomy chest with stable cardiomegaly.   3. Additional support tubes and lines as above.    Dictated by (CST): Eulalio Sahikh MD on 11/25/2024 at 11:45 AM     Finalized by (CST): Eulalio Shaikh MD on 11/25/2024 at 11:46 AM          XR ABDOMEN (1 VIEW) (CPT=74018)    Result Date: 11/25/2024  CONCLUSION:  1. Nonspecific nonobstructive bowel gas pattern.  2. Lesser incidental findings as above.    Dictated by (CST): Eulalio Shaikh MD on 11/25/2024 at 11:32 AM     Finalized by (CST): Eulalio Shaikh MD on 11/25/2024 at 11:34 AM          XR CHEST AP PORTABLE  (CPT=71045)    Result Date: 11/25/2024  CONCLUSION:  1. Scattered basilar reticular opacities may reflect atelectasis and/or superimposed pneumonia.  2. Postthoracotomy chest with stable cardiomegaly.  3. Additional support tubes and lines as above.    Dictated by (CST): Eulalio Shaikh MD on 11/25/2024 at 11:24 AM     Finalized by (CST): Eulalio Shaikh MD on 11/25/2024 at 11:28 AM          XR CHEST AP PORTABLE  (CPT=71045)    Result Date: 11/24/2024  CONCLUSION:  1. Postoperative changes from a recent repair of a Peter type A aortic dissection.  Lines/tubes in customary positioning. 2. Stable streaky opacities at the right lung base, which could relate to atelectasis or pneumonia.   Elm-remote  Dictated by (CST): Nathanael Hernandez MD on 11/24/2024 at 7:01 AM     Finalized by (CST):  Nathanael Hernandez MD on 11/24/2024 at 7:04 AM                    Exam:     Physical Exam:  General: intubated, sedated  HEENT: Normocephalic, anicteric sclera, neck supple, no thyromegaly or adenopathy.  Neck: No JVD, carotids 2+, no bruits.  Cardiac: Regular rate and rhythm. S1, S2 normal.   Lungs: Clear without wheezes, rales, rhonchi or dullness.    Abdomen: Soft, non-tender. No organosplenomegally, mass or rebound, BS-present.  Extremities: Without clubbing or cyanosis. No edema.    Neurologic: unable to obtain  Skin: Warm and dry.     Edward Montgomery, [facility] [place]

## 2024-11-26 NOTE — PLAN OF CARE
Problem: Diabetes/Glucose Control  Goal: Glucose maintained within prescribed range  Description: INTERVENTIONS:  - Monitor Blood Glucose as ordered  - Assess for signs and symptoms of hyperglycemia and hypoglycemia  - Administer ordered medications to maintain glucose within target range  - Assess barriers to adequate nutritional intake and initiate nutrition consult as needed  - Instruct patient on self management of diabetes  Outcome: Progressing     Problem: CARDIOVASCULAR - ADULT  Goal: Maintains optimal cardiac output and hemodynamic stability  Description: INTERVENTIONS:  - Monitor vital signs, rhythm, and trends  - Monitor for bleeding, hypotension and signs of decreased cardiac output  - Evaluate effectiveness of vasoactive medications to optimize hemodynamic stability  - Monitor arterial and/or venous puncture sites for bleeding and/or hematoma  - Assess quality of pulses, skin color and temperature  - Assess for signs of decreased coronary artery perfusion - ex. Angina  - Evaluate fluid balance, assess for edema, trend weights  Outcome: Progressing  Goal: Absence of cardiac arrhythmias or at baseline  Description: INTERVENTIONS:  - Continuous cardiac monitoring, monitor vital signs, obtain 12 lead EKG if indicated  - Evaluate effectiveness of antiarrhythmic and heart rate control medications as ordered  - Initiate emergency measures for life threatening arrhythmias  - Monitor electrolytes and administer replacement therapy as ordered  Outcome: Progressing     Problem: RESPIRATORY - ADULT  Goal: Achieves optimal ventilation and oxygenation  Description: INTERVENTIONS:  - Assess for changes in respiratory status  - Assess for changes in mentation and behavior  - Position to facilitate oxygenation and minimize respiratory effort  - Oxygen supplementation based on oxygen saturation or ABGs  - Provide Smoking Cessation handout, if applicable  - Encourage broncho-pulmonary hygiene including cough, deep  breathe, Incentive Spirometry  - Assess the need for suctioning and perform as needed  - Assess and instruct to report SOB or any respiratory difficulty  - Respiratory Therapy support as indicated  - Manage/alleviate anxiety  - Monitor for signs/symptoms of CO2 retention  Outcome: Not Progressing     Problem: GASTROINTESTINAL - ADULT  Goal: Minimal or absence of nausea and vomiting  Description: INTERVENTIONS:  - Maintain adequate hydration with IV or PO as ordered and tolerated  - Nasogastric tube to low intermittent suction as ordered  - Evaluate effectiveness of ordered antiemetic medications  - Provide nonpharmacologic comfort measures as appropriate  - Advance diet as tolerated, if ordered  - Obtain nutritional consult as needed  - Evaluate fluid balance  Outcome: Progressing  Goal: Maintains or returns to baseline bowel function  Description: INTERVENTIONS:  - Assess bowel function  - Maintain adequate hydration with IV or PO as ordered and tolerated  - Evaluate effectiveness of GI medications  - Encourage mobilization and activity  - Obtain nutritional consult as needed  - Establish a toileting routine/schedule  - Consider collaborating with pharmacy to review patient's medication profile  Outcome: Not Progressing     Problem: METABOLIC/FLUID AND ELECTROLYTES - ADULT  Goal: Glucose maintained within prescribed range  Description: INTERVENTIONS:  - Monitor Blood Glucose as ordered  - Assess for signs and symptoms of hyperglycemia and hypoglycemia  - Administer ordered medications to maintain glucose within target range  - Assess barriers to adequate nutritional intake and initiate nutrition consult as needed  - Instruct patient on self management of diabetes  Outcome: Progressing  Goal: Hemodynamic stability and optimal renal function maintained  Description: INTERVENTIONS:  - Monitor labs and assess for signs and symptoms of volume excess or deficit  - Monitor intake, output and patient weight  - Monitor  urine specific gravity, serum osmolarity and serum sodium as indicated or ordered  - Monitor response to interventions for patient's volume status, including labs, urine output, blood pressure (other measures as available)  - Encourage oral intake as appropriate  - Instruct patient on fluid and nutrition restrictions as appropriate  Outcome: Progressing     Problem: SKIN/TISSUE INTEGRITY - ADULT  Goal: Skin integrity remains intact  Description: INTERVENTIONS  - Assess and document risk factors for pressure ulcer development  - Assess and document skin integrity  - Monitor for areas of redness and/or skin breakdown  - Initiate interventions, skin care algorithm/standards of care as needed  Outcome: Progressing  Goal: Incision(s), wounds(s) or drain site(s) healing without S/S of infection  Description: INTERVENTIONS:  - Assess and document risk factors for pressure ulcer development  - Assess and document skin integrity  - Assess and document dressing/incision, wound bed, drain sites and surrounding tissue  - Implement wound care per orders  - Initiate isolation precautions as appropriate  - Initiate Pressure Ulcer prevention bundle as indicated  Outcome: Progressing  Goal: Oral mucous membranes remain intact  Description: INTERVENTIONS  - Assess oral mucosa and hygiene practices  - Implement preventative oral hygiene regimen  - Implement oral medicated treatments as ordered  Outcome: Progressing     Problem: Safety Risk - Non-Violent Restraints  Goal: Patient will remain free from self-harm  Description: INTERVENTIONS:  - Apply the least restrictive restraint to prevent harm  - Notify patient and family of reasons restraints applied  - Assess for any contributing factors to confusion (electrolyte disturbances, delirium, medications)  - Discontinue any unnecessary medical devices as soon as possible  - Assess the patient's physical comfort, circulation, skin condition, hydration, nutrition and elimination needs   -  Reorient and redirection as needed  - Assess for the need to continue restraints  Outcome: Progressing

## 2024-11-26 NOTE — PROGRESS NOTES
[facility]  part of [facility]     Progress Note    Alisha Wilde Patient Status:  Inpatient    10/25/1965 MRN [id number]   Location [facility] 2W/SW Attending Aguila Villegas MD   Hosp Day # 4 PCP Bridger Avendano MD       Subjective:   Patient seen and examined.  Intubated, sedated.  No significant distress.    Objective:   Blood pressure 122/66, pulse 68, temperature 98.4 °F (36.9 °C), temperature source Axillary, resp. rate 18, height 5' 5\" (1.651 m), weight 282 lb 3 oz (128 kg), SpO2 97%, not currently breastfeeding.  Intake/Output:   Last 3 shifts: I/O last 3 completed shifts:  In: 3840.6 [I.V.:2990.6; NG/GT:150; IV PIGGYBACK:700]  Out: 5158 [Urine:4570; Emesis/NG output:365; Chest Tube:223]   This shift: I/O this shift:  In: 60 [NG/GT:60]  Out: 830 [Urine:800; Chest Tube:30]     Vent Settings: Vent Mode: VC/AC  FiO2 (%):  [40 %-50 %] 40 %  S RR:  [18] 18  S VT:  [550 mL] 550 mL  PEEP/CPAP (cm H2O):  [5 cm H20] 5 cm H20  MAP (cm H2O):  [12-14] 13    Hemodynamic parameters (last 24 hours):      Scheduled Meds:   Current Facility-Administered Medications   Medication Dose Route Frequency    lidocaine (cardiac) (Xylocaine) 20 mg/mL injection        atropine 0.1 MG/ML injection        EPINEPHrine (Adrenalin) 1 MG/10ML (0.1 MG/ML) injection (Cardiac Arrest)        [START ON 2024] furosemide (Lasix) 10 mg/mL injection 40 mg  40 mg Intravenous BID (Diuretic)    amLODIPine (Norvasc) tab 5 mg  5 mg Oral Daily    ipratropium-albuterol (Duoneb) 0.5-2.5 (3) MG/3ML inhalation solution 3 mL  3 mL Nebulization Q6H PRN    metoclopramide (Reglan) 5 mg/mL injection 10 mg  10 mg Intravenous Q6H    docusate (Colace) 50 MG/5ML oral solution 100 mg  100 mg Oral BID    albuterol (Ventolin) (2.5 MG/3ML) 0.083% nebulizer solution 2.5 mg  2.5 mg Nebulization 4 times per day    acetylcysteine (Mucomyst) 20 % nebulizer solution 2 mL  2 mL Nebulization 4 times per day    HYDROcodone-acetaminophen  (Norco) 5-325 MG per tab 2 tablet  2 tablet Oral Q4H PRN    heparin (Porcine) 5000 UNIT/ML injection 5,000 Units  5,000 Units Subcutaneous Q8H GABRIEL    melatonin tab 3 mg  3 mg Oral Nightly PRN    sennosides (Senokot) tab 8.6 mg  8.6 mg Oral BID    polyethylene glycol (PEG 3350) (Miralax) 17 g oral packet 17 g  17 g Oral Daily PRN    bisacodyl (Dulcolax) 10 MG rectal suppository 10 mg  10 mg Rectal Daily PRN    ondansetron (Zofran) 4 MG/2ML injection 4 mg  4 mg Intravenous Q6H PRN    dexmedeTOMIDine in sodium chloride 0.9% (Precedex) 400 mcg/100mL infusion premix  0.2-1.5 mcg/kg/hr (Dosing Weight) Intravenous Continuous    nitroGLYCERIN in dextrose 5% 50 mg/250mL infusion premix  5-300 mcg/min Intravenous Continuous PRN    norepinephrine (Levophed) 4 mg/250mL infusion premix  0.5-30 mcg/min Intravenous Continuous PRN    metoprolol tartrate (Lopressor) tab 25 mg  25 mg Oral 2x Daily(Beta Blocker)    potassium chloride 20 mEq/100mL IVPB premix 20 mEq  20 mEq Intravenous PRN    Or    potassium chloride 40 mEq/100mL IVPB premix (central line) 40 mEq  40 mEq Intravenous PRN    magnesium sulfate in dextrose 5% 1 g/100mL infusion premix 1 g  1 g Intravenous PRN    magnesium sulfate in sterile water for injection 2 g/50mL IVPB premix 2 g  2 g Intravenous PRN    mupirocin (Bactroban) 2% nasal ointment 1 Application  1 Application Nasal BID    chlorhexidine gluconate (Peridex) 0.12 % oral solution 15 mL  15 mL Mouth/Throat BID    dextrose 5%-sodium chloride 0.45% infusion   mL/hr Intravenous Continuous    morphINE PF 2 MG/ML injection 2 mg  2 mg Intravenous Q2H PRN    propofol (Diprivan) 10 mg/mL infusion premix  5-50 mcg/kg/min (Dosing Weight) Intravenous Continuous    fentaNYL (Sublimaze) 50 mcg/mL injection 25 mcg  25 mcg Intravenous Q3H PRN    piperacillin-tazobactam (Zosyn) 3.375 g in dextrose 5% 100 mL IVPB-ADDV  3.375 g Intravenous Q8H    famotidine (Pepcid) tab 20 mg  20 mg Oral Daily    Or    famotidine (Pepcid)  20 mg/2mL injection 20 mg  20 mg Intravenous Daily    aspirin chewable tab 81 mg  81 mg Oral Daily    norepinephrine (Levophed) 4 mg/250mL infusion premix  0.5-30 mcg/min Intravenous Continuous    insulin regular human (Novolin R, Humulin R) 100 Units in sodium chloride 0.9% 100 mL standard infusion (100 mL)  1-40 Units/hr Intravenous Continuous       Continuous Infusions:    dexmedetomidine 0.8 mcg/kg/hr (11/26/24 1149)    nitroGLYCERIN in dextrose 5% 15 mcg/min (11/26/24 1349)    norepinephrine Stopped (11/23/24 2225)    dextrose 5%-sodium chloride 0.45% 40 mL/hr (11/25/24 0600)    propofol 45 mcg/kg/min (11/26/24 1246)    norepinephrine      insulin regular 1.5 Units/hr (11/26/24 1300)       Physical Exam  Constitutional: no acute distress, intubated, sedated  Eyes: PERRL  ENT: nares pateint  Neck: supple, no JVD  Cardio: RRR, S1 S2  Respiratory: Bilateral crackles  GI: abdomen soft, non tender, active bowel sounds, no organomegaly  Extremities: no clubbing, cyanosis, edema  Neurologic: no gross motor deficits  Skin: warm, dry      Results:     Lab Results   Component Value Date    WBC 9.9 11/26/2024    HGB 8.5 11/26/2024    HCT 26.5 11/26/2024    .0 11/26/2024    CREATSERUM 1.12 11/26/2024    BUN 25 11/26/2024     11/26/2024    K 3.4 11/26/2024     11/26/2024    CO2 22.0 11/26/2024     11/26/2024    CA 8.6 11/26/2024    ALB 3.5 11/26/2024    MG 2.2 11/26/2024    PHOS 3.3 11/26/2024       XR CHEST AP PORTABLE  (CPT=71045)    Result Date: 11/26/2024  CONCLUSION:  1. Cardiomegaly aneurysmal thoracic aorta.  Sternotomy changes 2. ET tube 1.8 cm above the jennyfer .  Recommend 1-2 cm pullback for optimal positioning 3. Remaining support tubes and catheters are satisfactory. 4. Left perihilar bibasilar postoperative parenchymal abnormality consistent with atelectasis and/or developing consolidation.  Slight progression.  Small bilateral effusions.    Dictated by (CST): Woodrow Fischer MD on  11/26/2024 at 8:32 AM     Finalized by (CST): Woodrow Fischer MD on 11/26/2024 at 8:42 AM          US ABDOMEN COMPLETE (CPT=76700)    Result Date: 11/25/2024  CONCLUSION:  Artifactually degraded examination. Within these parameters: 1. Gallbladder sludge without sonographic evidence acute complication.  2. Nonspecific pericholecystic fluid accumulation, which may be related to the patient's overall protein/volume status.  3. Negative for hepatobiliary dilatation.  4. Sonographic features of hepatic steatosis.  5. Minimally complex right renal cyst and left-sided peripelvic cysts.  6. Lesser incidental findings as above.    Dictated by (CST): Eulalio Shaikh MD on 11/25/2024 at 1:37 PM     Finalized by (CST): Eulalio Shaikh MD on 11/25/2024 at 1:42 PM          XR CHEST AP PORTABLE  (CPT=71045)    Result Date: 11/25/2024  CONCLUSION:  1. Bibasilar patchy opacities are present and may reflect atelectasis, with or without superimposed pneumonia.  2. Postthoracotomy chest with stable cardiomegaly.   3. Additional support tubes and lines as above.    Dictated by (CST): Eulalio Shaikh MD on 11/25/2024 at 11:45 AM     Finalized by (CST): Eulalio Shaikh MD on 11/25/2024 at 11:46 AM          XR ABDOMEN (1 VIEW) (CPT=74018)    Result Date: 11/25/2024  CONCLUSION:  1. Nonspecific nonobstructive bowel gas pattern.  2. Lesser incidental findings as above.    Dictated by (CST): Eulalio Shaikh MD on 11/25/2024 at 11:32 AM     Finalized by (CST): Eulalio Shaikh MD on 11/25/2024 at 11:34 AM          XR CHEST AP PORTABLE  (CPT=71045)    Result Date: 11/25/2024  CONCLUSION:  1. Scattered basilar reticular opacities may reflect atelectasis and/or superimposed pneumonia.  2. Postthoracotomy chest with stable cardiomegaly.  3. Additional support tubes and lines as above.    Dictated by (CST): Eulalio Shaikh MD on 11/25/2024 at 11:24 AM     Finalized by (CST): Eulalio Shaikh MD on 11/25/2024 at 11:28 AM                  Assessment   1.  Type a aortic dissection status postrepair  2.  Acute hypoxemic respiratory failure  3.  Acute kidney injury on chronic kidney disease  4.  Aspiration pneumonia  5.  Anemia   6.  Prior nicotine dependence     Plan   -Patient presenting with evidence of type a aortic dissection status post repair by CV surgery on 11/21/2024.  -Postoperative respiratory failure complicated by self extubation with emesis and aspiration noted afterwards requiring reintubation.  -Continue antibiotic therapy with Zosyn at this time  -Has not been tolerating spontaneous breathing trial but will perform again today and see how patient tolerates.  -CT chest abdomen pelvis on 11/26/2024 with bilateral multi lobar nodular consolidation seen with some atelectatic changes present.  -Bronchial hygiene.  -Continue tube feedings  -Closely monitor renal function and urine output  -Further recommendations per CV surgery  -DVT prophylaxis: Heparin    Dmitri Herman, DO  Pulmonary Critical Care Medicine  [facility]

## 2024-11-26 NOTE — PROGRESS NOTES
Progress Note     Alisha Wilde Patient Status:  Inpatient    10/25/1965 MRN [id number]   Location [facility] 2W/SW Attending Aguila Villegas MD   Hosp Day # 4 PCP Bridger Avendano MD     Chief Complaint:   Chief Complaint   Patient presents with    Chest Pain Angina         Subjective:   S: Patient seen and examined, chart reviewed.   Going for CT chest/abd today   Still on little NTG drip  T/c stopping zosyn  Could increase BB        Review of Systems:   10 point ROS completed and was negative, except for pertinent positive and negatives stated in subjective.                Objective:   Vital signs:  Temp:  [97.7 °F (36.5 °C)-98.4 °F (36.9 °C)] 97.7 °F (36.5 °C)  Pulse:  [68-81] 69  Resp:  [16-25] 18  BP: ()/() 129/71  SpO2:  [91 %-100 %] 97 %  AO: (110-157)/(39-70) 124/52  FiO2 (%):  [40 %-60 %] 40 %    Wt Readings from Last 6 Encounters:   24 282 lb 3 oz (128 kg)   10/24/24 254 lb (115.2 kg)   24 249 lb (112.9 kg)   24 249 lb (112.9 kg)   23 249 lb (112.9 kg)   10/09/23 248 lb (112.5 kg)       Physical Exam:    General: No acute distress.   Respiratory: Clear to auscultation bilaterally. No wheezes. No rhonchi. CT still in place  Cardiovascular: S1, S2. Regular rate and rhythm. No murmurs, rubs or gallops. Lines and mediastinal line out today  Abdomen: Soft, nontender, nondistended.  Positive bowel sounds. No rebound or guarding.  Neurologic: No focal neurological deficits.   Musculoskeletal: Moves all extremities.  Extremities: No edema.    Results:   Diagnostic Data:      Labs:    Labs Last 24 Hours:   BMP     CBC    Other     Na 144 Cl 112 BUN 25 Glu 124   Hb 8.5   PTT - Procal -   K 3.4 CO2 22.0 Cr 1.12   WBC 9.9 >< .0  INR - CRP -   Renal Lytes Endo    Hct 26.5   Trop - D dim -   eGFR - Ca 8.6 POC Gluc  126    LFT   pBNP - Lactic 0.8   eGFR AA - PO4 3.3 A1c -   AST - APk - Prot -  LDL -      Recent Labs   Lab 24  1720 24  013  11/23/24  0418 11/24/24  0430 11/25/24  0509 11/26/24  0546   RBC 4.58   < > 3.56* 3.14* 3.12* 3.17*   HGB 12.2   < > 9.6* 8.5* 8.3* 8.5*   HCT 38.3   < > 29.9* 26.1* 26.4* 26.5*   MCV 83.6   < > 84.0 83.1 84.6 83.6   MCH 26.6   < > 27.0 27.1 26.6 26.8   MCHC 31.9   < > 32.1 32.6 31.4 32.1   RDW 15.9*   < > 16.4* 16.3* 16.1* 16.2*   NEPRELIM 3.70  --  11.86*  --  7.00  --    WBC 6.4   < > 13.4* 9.3 9.4 9.9   .0   < > 127.0* 106.0* 102.0* 133.0*    < > = values in this interval not displayed.       Lab Results   Component Value Date    INR 1.25 (H) 11/22/2024    INR 0.99 11/21/2024    INR 1.02 08/10/2023       Recent Labs   Lab 11/21/24  1720 11/22/24  0251 11/23/24  0418 11/24/24  0430 11/25/24  0509 11/25/24  1746 11/26/24  0546   *   < > 142* 132* 137* 119* 124*   BUN 21   < > 32* 32* 29* 26* 25*   CREATSERUM 1.19*   < > 2.28* 1.38* 1.13* 1.15* 1.12*   CA 9.6   < > 8.4* 8.3* 8.4* 8.4* 8.6*   ALB 4.3  --  3.4 3.4 3.5  --  3.5   *   < > 144 144 143 143 144   K 3.4*   < > 4.2 3.3* 3.3* 3.7 3.4*      < > 112 111 111 111 112   CO2 28.0   < > 21.0 23.0 23.0 23.0 22.0   ALKPHO 90  --  49*  --  42*  --   --    AST 27  --  33  --  20  --   --    ALT 22  --  9*  --  <7*  --   --    BILT 0.4  --  0.3  --  0.4  --   --    TP 6.9  --  5.3*  --  5.4*  --   --     < > = values in this interval not displayed.       Recent Labs   Lab 11/25/24  1230   NATHALIE 99   LIP 28       Recent Labs   Lab 11/24/24  0430 11/25/24  0509 11/26/24  0546   MG 2.2 2.2 2.2   PHOS 3.3 3.1 3.3       No results for input(s): \"URINE\", \"CULTI\", \"BLDSMR\" in the last 168 hours.      XR CHEST AP PORTABLE  (CPT=71045)    Result Date: 11/26/2024  CONCLUSION:  1. Cardiomegaly aneurysmal thoracic aorta.  Sternotomy changes 2. ET tube 1.8 cm above the jennyfer .  Recommend 1-2 cm pullback for optimal positioning 3. Remaining support tubes and catheters are satisfactory. 4. Left perihilar bibasilar postoperative parenchymal abnormality  consistent with atelectasis and/or developing consolidation.  Slight progression.  Small bilateral effusions.    Dictated by (CST): Woodrow Fischer MD on 11/26/2024 at 8:32 AM     Finalized by (CST): Woodrow Fischer MD on 11/26/2024 at 8:42 AM          US ABDOMEN COMPLETE (CPT=76700)    Result Date: 11/25/2024  CONCLUSION:  Artifactually degraded examination. Within these parameters: 1. Gallbladder sludge without sonographic evidence acute complication.  2. Nonspecific pericholecystic fluid accumulation, which may be related to the patient's overall protein/volume status.  3. Negative for hepatobiliary dilatation.  4. Sonographic features of hepatic steatosis.  5. Minimally complex right renal cyst and left-sided peripelvic cysts.  6. Lesser incidental findings as above.    Dictated by (CST): Eulalio Shaikh MD on 11/25/2024 at 1:37 PM     Finalized by (CST): Eulalio Shaikh MD on 11/25/2024 at 1:42 PM          XR CHEST AP PORTABLE  (CPT=71045)    Result Date: 11/25/2024  CONCLUSION:  1. Bibasilar patchy opacities are present and may reflect atelectasis, with or without superimposed pneumonia.  2. Postthoracotomy chest with stable cardiomegaly.   3. Additional support tubes and lines as above.    Dictated by (CST): Eulalio Shaikh MD on 11/25/2024 at 11:45 AM     Finalized by (CST): Eulalio Shaikh MD on 11/25/2024 at 11:46 AM          XR ABDOMEN (1 VIEW) (CPT=74018)    Result Date: 11/25/2024  CONCLUSION:  1. Nonspecific nonobstructive bowel gas pattern.  2. Lesser incidental findings as above.    Dictated by (CST): Eulalio Shaikh MD on 11/25/2024 at 11:32 AM     Finalized by (CST): Eulalio Shaikh MD on 11/25/2024 at 11:34 AM          XR CHEST AP PORTABLE  (CPT=71045)    Result Date: 11/25/2024  CONCLUSION:  1. Scattered basilar reticular opacities may reflect atelectasis and/or superimposed pneumonia.  2. Postthoracotomy chest with stable cardiomegaly.  3. Additional support tubes and lines as above.     Dictated by (CST): Eulalio Shaikh MD on 11/25/2024 at 11:24 AM     Finalized by (CST): Eulalio Shaikh MD on 11/25/2024 at 11:28 AM               Pro-Calcitonin  No results for input(s): \"PCT\" in the last 168 hours.    Cardiac  Recent Labs   Lab 11/21/24  1720   PBNP 134*       Imaging: Imaging data reviewed in [facility].    XR CHEST AP PORTABLE  (CPT=71045)    Result Date: 11/26/2024  CONCLUSION:  1. Cardiomegaly aneurysmal thoracic aorta.  Sternotomy changes 2. ET tube 1.8 cm above the jennyfer .  Recommend 1-2 cm pullback for optimal positioning 3. Remaining support tubes and catheters are satisfactory. 4. Left perihilar bibasilar postoperative parenchymal abnormality consistent with atelectasis and/or developing consolidation.  Slight progression.  Small bilateral effusions.    Dictated by (CST): Woodrow Fischer MD on 11/26/2024 at 8:32 AM     Finalized by (CST): Woodrow Fischer MD on 11/26/2024 at 8:42 AM          US ABDOMEN COMPLETE (CPT=76700)    Result Date: 11/25/2024  CONCLUSION:  Artifactually degraded examination. Within these parameters: 1. Gallbladder sludge without sonographic evidence acute complication.  2. Nonspecific pericholecystic fluid accumulation, which may be related to the patient's overall protein/volume status.  3. Negative for hepatobiliary dilatation.  4. Sonographic features of hepatic steatosis.  5. Minimally complex right renal cyst and left-sided peripelvic cysts.  6. Lesser incidental findings as above.    Dictated by (CST): Eulalio Shaikh MD on 11/25/2024 at 1:37 PM     Finalized by (CST): Eulalio Shaikh MD on 11/25/2024 at 1:42 PM          XR CHEST AP PORTABLE  (CPT=71045)    Result Date: 11/25/2024  CONCLUSION:  1. Bibasilar patchy opacities are present and may reflect atelectasis, with or without superimposed pneumonia.  2. Postthoracotomy chest with stable cardiomegaly.   3. Additional support tubes and lines as above.    Dictated by (CST): Eulalio Shaikh MD on 11/25/2024  at 11:45 AM     Finalized by (CST): Eulalio Shaikh MD on 11/25/2024 at 11:46 AM          XR ABDOMEN (1 VIEW) (CPT=74018)    Result Date: 11/25/2024  CONCLUSION:  1. Nonspecific nonobstructive bowel gas pattern.  2. Lesser incidental findings as above.    Dictated by (CST): Eulalio Shaikh MD on 11/25/2024 at 11:32 AM     Finalized by (CST): Eulalio Shaikh MD on 11/25/2024 at 11:34 AM          XR CHEST AP PORTABLE  (CPT=71045)    Result Date: 11/25/2024  CONCLUSION:  1. Scattered basilar reticular opacities may reflect atelectasis and/or superimposed pneumonia.  2. Postthoracotomy chest with stable cardiomegaly.  3. Additional support tubes and lines as above.    Dictated by (CST): Eulalio Shaikh MD on 11/25/2024 at 11:24 AM     Finalized by (CST): Eulalio Shaikh MD on 11/25/2024 at 11:28 AM              Medications:    potassium chloride  40 mEq Intravenous Once    amLODIPine  5 mg Oral Daily    metoclopramide  10 mg Intravenous Q6H    docusate  100 mg Oral BID    furosemide  40 mg Intravenous TID    albuterol  2.5 mg Nebulization 4 times per day    acetylcysteine  2 mL Nebulization 4 times per day    heparin  5,000 Units Subcutaneous Q8H GABRIEL    sennosides  8.6 mg Oral BID    metoprolol tartrate  25 mg Oral 2x Daily(Beta Blocker)    mupirocin  1 Application Nasal BID    chlorhexidine gluconate  15 mL Mouth/Throat BID    piperacillin-tazobactam  3.375 g Intravenous Q8H    famotidine  20 mg Oral Daily    Or    famotidine  20 mg Intravenous Daily    aspirin  81 mg Oral Daily       Assessment & Plan:   ASSESSMENT / PLAN:      Hypercholesteremia    Alcoholism (HCC)    Essential hypertension    Vitamin D deficiency    Adjustment disorder with mixed anxiety and depressed mood    Controlled type 2 diabetes mellitus without complication, without long-term current use of insulin (HCC)    Obesity (BMI 30-39.9)    Neck mass    Polyp of colon    Flat feet, bilateral    Hypokalemia    Myalgia due to statin    Age-related  nuclear cataract of both eyes    Positive for microalbuminuria    Dissection of ascending aorta (HCC)         Plan:      Acute type A aortic dissection  - went from RCA to left common and external iliac arteries  - s/p emergent repair by Dr Gregg  - hope to extubate soon  - pulm following  - wound care per surgery  - echo results still pending from 11/23 test     Acute post op respiratory failure  - with self extubation and then emergently re-intubated  - possible aspiration pneumonia after that   - started zosyn now day day 5  - ct chest today shows bilateral lower lobe infiltrates, c/w aspiration which occurred 5 days ago.     HTN  - cleviprex stopped  -nitroglycerin just a little     Third degree heart block  - improved with atropine      Hx of etoh abuse  - monitor     Morbid obesity with likely TANYA  - BMI 42  - fu as outpt  - kub today to eval distension.      NAIMA on CKD stage 2-3 - possible ATN vs contrast induced  - renal consulted  - renal US simle cysts 7 cm, complex cyst 2 cm right kidney (Bosniak 2F cyst) recommend surveillance.   - monitor I/O  - monitor closely     Dispo: pending clinical course     Global A/P  - reviewed labs and vitals from today  - reviewed notes of the day  - cbc, bmp, mag ordered for tomorrow  - discussed need to stay in the hospital today due to above  - discussed with patient/RN and care team     MDM: High complexity- acute illness posing a threat to life. IV meds requiring close inpatient monitoring. I personally spent time on chart/note review, review of labs/imaging, discussion with patient, physical exam, discussion with staff, writing progress note, and discussion of plan of care.       >55min spent, >50% spent counseling and coordinating care in the form of educating pt/family and d/w consultants and staff. Most of the time spent discussing the above plan.   Plan of care discussed with nurse     35 minutes of critical care time spent        Aguila Villegas MD, FAAP,  FACP  Former[facility] Hospitalist  I respond to Epic Chat and AMS Connect

## 2024-11-26 NOTE — PROGRESS NOTES
[facility]    Progress Note      Subjective:     Remained intubated and sedated.  FiO2 40%.  Webb with clear urine new line plan for CT chest abdomen pelvis      Review of Systems:     Unable to obtain    Objective:   Temp:  [97.7 °F (36.5 °C)-98.4 °F (36.9 °C)] 97.7 °F (36.5 °C)  Pulse:  [68-73] 69  Resp:  [16-21] 18  BP: ()/() 129/71  SpO2:  [91 %-99 %] 97 %  AO: (110-157)/(44-70) 124/52  FiO2 (%):  [40 %-50 %] 40 %  SpO2: 97 %     Intake/Output Summary (Last 24 hours) at 11/26/2024 1305  Last data filed at 11/26/2024 1000  Gross per 24 hour   Intake 1986.5 ml   Output 2920 ml   Net -933.5 ml     Wt Readings from Last 3 Encounters:   11/26/24 282 lb 3 oz (128 kg)   10/24/24 254 lb (115.2 kg)   05/17/24 249 lb (112.9 kg)       General appearance: Intubated and sedated.  Head: Normocephalic, atraumatic  Neck:  no JVD, supple, symmetrical  Skin: No rashes or lesions  Neurologic: unable to examine   Psychiatric: calm  Webb with clear urine    Medications:  Current Facility-Administered Medications   Medication Dose Route Frequency    lidocaine (cardiac) (Xylocaine) 20 mg/mL injection        atropine 0.1 MG/ML injection        EPINEPHrine (Adrenalin) 1 MG/10ML (0.1 MG/ML) injection (Cardiac Arrest)        amLODIPine (Norvasc) tab 5 mg  5 mg Oral Daily    ipratropium-albuterol (Duoneb) 0.5-2.5 (3) MG/3ML inhalation solution 3 mL  3 mL Nebulization Q6H PRN    metoclopramide (Reglan) 5 mg/mL injection 10 mg  10 mg Intravenous Q6H    docusate (Colace) 50 MG/5ML oral solution 100 mg  100 mg Oral BID    furosemide (Lasix) 10 mg/mL injection 40 mg  40 mg Intravenous TID    albuterol (Ventolin) (2.5 MG/3ML) 0.083% nebulizer solution 2.5 mg  2.5 mg Nebulization 4 times per day    acetylcysteine (Mucomyst) 20 % nebulizer solution 2 mL  2 mL Nebulization 4 times per day    HYDROcodone-acetaminophen (Norco) 5-325 MG per tab 2 tablet  2 tablet Oral Q4H PRN    heparin  (Porcine) 5000 UNIT/ML injection 5,000 Units  5,000 Units Subcutaneous Q8H GABRIEL    melatonin tab 3 mg  3 mg Oral Nightly PRN    sennosides (Senokot) tab 8.6 mg  8.6 mg Oral BID    polyethylene glycol (PEG 3350) (Miralax) 17 g oral packet 17 g  17 g Oral Daily PRN    bisacodyl (Dulcolax) 10 MG rectal suppository 10 mg  10 mg Rectal Daily PRN    ondansetron (Zofran) 4 MG/2ML injection 4 mg  4 mg Intravenous Q6H PRN    dexmedeTOMIDine in sodium chloride 0.9% (Precedex) 400 mcg/100mL infusion premix  0.2-1.5 mcg/kg/hr (Dosing Weight) Intravenous Continuous    nitroGLYCERIN in dextrose 5% 50 mg/250mL infusion premix  5-300 mcg/min Intravenous Continuous PRN    norepinephrine (Levophed) 4 mg/250mL infusion premix  0.5-30 mcg/min Intravenous Continuous PRN    metoprolol tartrate (Lopressor) tab 25 mg  25 mg Oral 2x Daily(Beta Blocker)    potassium chloride 20 mEq/100mL IVPB premix 20 mEq  20 mEq Intravenous PRN    Or    potassium chloride 40 mEq/100mL IVPB premix (central line) 40 mEq  40 mEq Intravenous PRN    magnesium sulfate in dextrose 5% 1 g/100mL infusion premix 1 g  1 g Intravenous PRN    magnesium sulfate in sterile water for injection 2 g/50mL IVPB premix 2 g  2 g Intravenous PRN    mupirocin (Bactroban) 2% nasal ointment 1 Application  1 Application Nasal BID    chlorhexidine gluconate (Peridex) 0.12 % oral solution 15 mL  15 mL Mouth/Throat BID    dextrose 5%-sodium chloride 0.45% infusion   mL/hr Intravenous Continuous    morphINE PF 2 MG/ML injection 2 mg  2 mg Intravenous Q2H PRN    propofol (Diprivan) 10 mg/mL infusion premix  5-50 mcg/kg/min (Dosing Weight) Intravenous Continuous    fentaNYL (Sublimaze) 50 mcg/mL injection 25 mcg  25 mcg Intravenous Q3H PRN    piperacillin-tazobactam (Zosyn) 3.375 g in dextrose 5% 100 mL IVPB-ADDV  3.375 g Intravenous Q8H    famotidine (Pepcid) tab 20 mg  20 mg Oral Daily    Or    famotidine (Pepcid) 20 mg/2mL injection 20 mg  20 mg Intravenous Daily    aspirin  chewable tab 81 mg  81 mg Oral Daily    norepinephrine (Levophed) 4 mg/250mL infusion premix  0.5-30 mcg/min Intravenous Continuous    insulin regular human (Novolin R, Humulin R) 100 Units in sodium chloride 0.9% 100 mL standard infusion (100 mL)  1-40 Units/hr Intravenous Continuous          Results:     Recent Labs   Lab 11/21/24  1720 11/22/24  0133 11/23/24  0418 11/24/24  0430 11/25/24  0509 11/26/24  0546   RBC 4.58   < > 3.56* 3.14* 3.12* 3.17*   HGB 12.2   < > 9.6* 8.5* 8.3* 8.5*   HCT 38.3   < > 29.9* 26.1* 26.4* 26.5*   MCV 83.6   < > 84.0 83.1 84.6 83.6   NEPRELIM 3.70  --  11.86*  --  7.00  --    WBC 6.4   < > 13.4* 9.3 9.4 9.9   .0   < > 127.0* 106.0* 102.0* 133.0*    < > = values in this interval not displayed.     Recent Labs   Lab 11/21/24  1720 11/22/24  0251 11/23/24  0418 11/24/24  0430 11/25/24  0509 11/25/24  1746 11/26/24  0546   *   < > 142* 132* 137* 119* 124*   BUN 21   < > 32* 32* 29* 26* 25*   CREATSERUM 1.19*   < > 2.28* 1.38* 1.13* 1.15* 1.12*   CA 9.6   < > 8.4* 8.3* 8.4* 8.4* 8.6*   ALB 4.3  --  3.4 3.4 3.5  --  3.5   *   < > 144 144 143 143 144   K 3.4*   < > 4.2 3.3* 3.3* 3.7 3.4*      < > 112 111 111 111 112   CO2 28.0   < > 21.0 23.0 23.0 23.0 22.0   ALKPHO 90  --  49*  --  42*  --   --    AST 27  --  33  --  20  --   --    ALT 22  --  9*  --  <7*  --   --    BILT 0.4  --  0.3  --  0.4  --   --    TP 6.9  --  5.3*  --  5.4*  --   --     < > = values in this interval not displayed.     PTT   Date Value Ref Range Status   11/22/2024 28.2 23.0 - 36.0 seconds Final     INR   Date Value Ref Range Status   11/22/2024 1.25 (H) 0.80 - 1.20 Final     Comment:     Therapeutic INR range for patients on warfarin:  2.0-3.0 for most medical conditions and surgical prophylaxis   2.5-3.5 for mechanical heart valves and recurrent thromboembolism    Direct thrombin inhibitors (e.g. argatroban, bivalirudin), factor Xa inhibitors (e.g. apixaban, rivaroxaban), and conditions  such as coagulation factor deficiency, vitamin K deficiency, and liver disease will prolong the prothrombin time/INR.    Unfractionated heparin and low molecular weight heparin do not affect the prothrombin time/INR up to a concentration of 1 IU/mL and 1.5 IU/mL, respectively.         No results for input(s): \"BNP\" in the last 168 hours.  Recent Labs   Lab 11/24/24  0430 11/25/24  0509 11/26/24  0546   MG 2.2 2.2 2.2   PHOS 3.3 3.1 3.3        Recent Labs   Lab 11/24/24  0430 11/25/24  0509 11/26/24  0546   PHOS 3.3 3.1 3.3   ALB 3.4 3.5 3.5       XR CHEST AP PORTABLE  (CPT=71045)    Result Date: 11/26/2024  CONCLUSION:  1. Cardiomegaly aneurysmal thoracic aorta.  Sternotomy changes 2. ET tube 1.8 cm above the jennyfer .  Recommend 1-2 cm pullback for optimal positioning 3. Remaining support tubes and catheters are satisfactory. 4. Left perihilar bibasilar postoperative parenchymal abnormality consistent with atelectasis and/or developing consolidation.  Slight progression.  Small bilateral effusions.    Dictated by (CST): Woodrow Fischer MD on 11/26/2024 at 8:32 AM     Finalized by (CST): Woodrow Fischer MD on 11/26/2024 at 8:42 AM          US ABDOMEN COMPLETE (CPT=76700)    Result Date: 11/25/2024  CONCLUSION:  Artifactually degraded examination. Within these parameters: 1. Gallbladder sludge without sonographic evidence acute complication.  2. Nonspecific pericholecystic fluid accumulation, which may be related to the patient's overall protein/volume status.  3. Negative for hepatobiliary dilatation.  4. Sonographic features of hepatic steatosis.  5. Minimally complex right renal cyst and left-sided peripelvic cysts.  6. Lesser incidental findings as above.    Dictated by (CST): Eulalio Shaikh MD on 11/25/2024 at 1:37 PM     Finalized by (CST): Eulalio Shaikh MD on 11/25/2024 at 1:42 PM          XR CHEST AP PORTABLE  (CPT=71045)    Result Date: 11/25/2024  CONCLUSION:  1. Bibasilar patchy opacities are  present and may reflect atelectasis, with or without superimposed pneumonia.  2. Postthoracotomy chest with stable cardiomegaly.   3. Additional support tubes and lines as above.    Dictated by (CST): Eulalio Shaikh MD on 11/25/2024 at 11:45 AM     Finalized by (CST): Eulalio Shaikh MD on 11/25/2024 at 11:46 AM          XR ABDOMEN (1 VIEW) (CPT=74018)    Result Date: 11/25/2024  CONCLUSION:  1. Nonspecific nonobstructive bowel gas pattern.  2. Lesser incidental findings as above.    Dictated by (CST): Eulalio Shaikh MD on 11/25/2024 at 11:32 AM     Finalized by (CST): Eulalio Shaikh MD on 11/25/2024 at 11:34 AM          XR CHEST AP PORTABLE  (CPT=71045)    Result Date: 11/25/2024  CONCLUSION:  1. Scattered basilar reticular opacities may reflect atelectasis and/or superimposed pneumonia.  2. Postthoracotomy chest with stable cardiomegaly.  3. Additional support tubes and lines as above.    Dictated by (CST): Eulalio Shaikh MD on 11/25/2024 at 11:24 AM     Finalized by (CST): Eulalio Shaikh MD on 11/25/2024 at 11:28 AM               Assessment and Plan:       1.NAIMA on CKD stage III: Nonoliguric on diuretic  NAIMA secondary to MARY LOU/ATN  Improved kidney function creatinine stable at 1.1 mg/dL for last few days  Continue to monitor with diuretics    2.aortic dissection: S/p emergent aortic dissection repair  CT surgery input noted    3.respiratory failure: Improve oxygen requirement and currently on 40% FiO2  Change diuretics to 40 mg IV twice daily  Chest x-ray noted    4 -renal cyst  The renal ultrasound that was done yesterday showed a mildly complex cyst in the right kidney.  This will need to be followed up with surveillance ultrasound    Discussed with nursing         Robbi Resendiz MD

## 2024-11-26 NOTE — DIETARY NOTE
Dietitian Note     Received consult for PCI cardiac diet education. Considering current clinical status. Diet education not appropriate. Will clear consult and remains available for further nutrition interventions as needed.      Stefany Argueta RD, LDN  Clinical Dietitian  P: [phone]

## 2024-11-26 NOTE — RESPIRATORY THERAPY NOTE
Patient received intubated and on ventilator VC 18/550/+5/50%. Bilateral breath sounds auscultated. Suction provided when indicated. No acute events during the night. RT will continue to monitor.       11/26/24 0305   Readings   Total RR 18   Minute Ventilation (L/min) 10.1 L/min   Expiratory Tidal Volume 561 mL   PIP Observed (cm H2O) 42 cm H2O   MAP (cm H2O) 14

## 2024-11-26 NOTE — PROGRESS NOTES
[facility]  part of [facility]    Progress Note    Alisha Wilde Patient Status:  Inpatient    10/25/1965 MRN [id number]   Location [facility] 2W/SW Attending Aguila Villegas MD   Hosp Day # 4 PCP Bridger Avendano MD     Subjective:  Pt intubated/sedated currently on full vent support. No visitors at bedside.     Objective:  /70 (BP Location: Right arm)   Pulse 69   Temp 98.4 °F (36.9 °C) (Temporal)   Resp 18   Ht 5' 5\" (1.651 m)   Wt 282 lb 3 oz (128 kg)   SpO2 99%   BMI 46.96 kg/m²     Temp (24hrs), Av.2 °F (36.8 °C), Min:98 °F (36.7 °C), Max:98.4 °F (36.9 °C)      Intake/Output:    Intake/Output Summary (Last 24 hours) at 2024 0910  Last data filed at 2024 0621  Gross per 24 hour   Intake 2069.34 ml   Output 3819 ml   Net -1749.66 ml       Wt Readings from Last 3 Encounters:   24 282 lb 3 oz (128 kg)   10/24/24 254 lb (115.2 kg)   24 249 lb (112.9 kg)       Allergies:  Allergies[1]    Labs:  Lab Results   Component Value Date    WBC 9.9 2024    HGB 8.5 2024    HCT 26.5 2024    .0 2024    CREATSERUM 1.12 2024    BUN 25 2024     2024    K 3.4 2024     2024    CO2 22.0 2024     2024    CA 8.6 2024    ALB 3.5 2024    NATHALIE 99 2024    LIP 28 2024    MG 2.2 2024    PHOS 3.3 2024       Physical Exam:  Blood pressure 130/70, pulse 69, temperature 98.4 °F (36.9 °C), temperature source Temporal, resp. rate 18, height 5' 5\" (1.651 m), weight 282 lb 3 oz (128 kg), SpO2 99%, not currently breastfeeding.  General: NAD  Lungs: mildly coarse w/expiratory wheezing  Mediastinal Chest tube=10cc/12 hours-no air leak   Pleural Chest tube=20cc/12 hours- no air leak   Heart: RRR, S1, S2  Abdomen: slightly firm/obese/mildly distended, BS + x 4 diminished/tympanic/no BM   OGT to LIS=5cc/12 hours  Extremities: Warm, dry, 1+ generalized UE  & LE edema bilat  Pulses: 2+ bilat DP  Skin: sternotomy incision w/Provena drsg: CDI w/TPW intact/Left femoral cannulation site w/drsg: CDI   Neurological:  sedated    Assessment/Plan:  S/P EMERGENT AORTIC DISSECTION REPAIR POD # 4  Respiratory Failure-currently on 50% full vent support. Wean as evelyn/extubate when appropriate. Mgt per Pulm. Re-intubated on 11/22 per Pulm after emesis episode/ETT removal due to emesis content noted in airway.  CXR reviewed.   ABX for suspected aspiration pneumonia.   Obtain CT Chest/Abdomen/Pelvis (non-contrast) today due to prolonged intubation & abdominal distension: suspect ileus. KUB 11/25=nonobstructive bowel pattern.  HTN: on NTG-SBP goal= <130/MAP >70. Mgt per Cards  Pain meds as needed  Scds/Heparin SubQ prophylaxis DVT prevention. HIPA 11/25=negative. Plts continue >100,000  Continue ICU status  Remove pacing wires and pleural chest tube today. Anticipate Tanner drain removal tomorrow.   Expected post op volume overload: continues on Lasix TID-mgt per Nephrology. Hx: CKD-limit/avoid nephrotoxic meds  Expected post op anemia: w/o clinical significance/stable. May be diluted due to volume overload.   IV Insulin protocol. No hx: DM-continue IV Insulin protocol-transition when appropriate  Hx: Morbid obesity w/BMI >40-plan for RD to see when appropriate  Nutrition: NPO-suspect ileus-awaiting CT results. Plan to start TF when appropriate. Continues on Reglan/Senna/Colace.  Hx: CKD w/mgt per Nephrology consulted on 11/23 for post op NAIMA-following recs  Hx: ETOH abuse-monitor for withdrawals. CIWA protocol PRN   Discharge planning: pt lives alone and has supportive family. Continue to monitor as pt progresses.     Addendum @ 1530:   CT results discussed w/CV Surgeon. Plan to wean/extubate tomorrow. If able to extubate- plan for swallow eval. If unable to extubate-consider starting TF.     Plan of care discussed w/RN and CV Surgeon: Dr. Marysol Noel, APRN  11/26/2024  9:10  AM         [1]   Allergies  Allergen Reactions    Atorvastatin MYALGIA    Pravastatin MYALGIA    Rosuvastatin MYALGIA    Seasonal Runny nose

## 2024-11-26 NOTE — PROGRESS NOTES
11/25/24 2030   Vent Information   Vent ID    Vent Mode VC/AC   Settings   FiO2 (%) 50 %   Resp Rate (Set) 18   Vt (Set, mL) 550 mL   Waveform Decelerating ramp   PEEP/CPAP (cm H2O) 5 cm H20   Trigger Sensitivity Flow (L/min) 3 L/min   Humidification Heat and moisture exchanger     Pt received intubated on the above settings. ETT 7.5 secured 25 @ lips. Bilateral BS auscultated. Snx provided as indicated. ETT advanced to 27 @ lips per x-ray and pulmonary. Neb tx administered as per order. No events on shift. RT continue to monitor.

## 2024-11-26 NOTE — PLAN OF CARE
Follows commands during SAT. Failed SBT, patient increasingly hypertensive, increased needs nitroglycerin gtt, tachypneic, and increased secretions.  Tanner drain level at 770. About 1500ml out of aguilar. Pacer wires removed and chest tube removed without complications.       Problem: CARDIOVASCULAR - ADULT  Goal: Maintains optimal cardiac output and hemodynamic stability  Description: INTERVENTIONS:  - Monitor vital signs, rhythm, and trends  - Monitor for bleeding, hypotension and signs of decreased cardiac output  - Evaluate effectiveness of vasoactive medications to optimize hemodynamic stability  - Monitor arterial and/or venous puncture sites for bleeding and/or hematoma  - Assess quality of pulses, skin color and temperature  - Assess for signs of decreased coronary artery perfusion - ex. Angina  - Evaluate fluid balance, assess for edema, trend weights  Outcome: Not Progressing     Problem: RESPIRATORY - ADULT  Goal: Achieves optimal ventilation and oxygenation  Description: INTERVENTIONS:  - Assess for changes in respiratory status  - Assess for changes in mentation and behavior  - Position to facilitate oxygenation and minimize respiratory effort  - Oxygen supplementation based on oxygen saturation or ABGs  - Provide Smoking Cessation handout, if applicable  - Encourage broncho-pulmonary hygiene including cough, deep breathe, Incentive Spirometry  - Assess the need for suctioning and perform as needed  - Assess and instruct to report SOB or any respiratory difficulty  - Respiratory Therapy support as indicated  - Manage/alleviate anxiety  - Monitor for signs/symptoms of CO2 retention  Outcome: Not Progressing     Problem: NEUROLOGICAL - ADULT  Goal: Achieves stable or improved neurological status  Description: INTERVENTIONS  - Assess for and report changes in neurological status  - Initiate measures to prevent increased intracranial pressure  - Maintain blood pressure and fluid volume within ordered  parameters to optimize cerebral perfusion and minimize risk of hemorrhage  - Monitor temperature, glucose, and sodium. Initiate appropriate interventions as ordered  Outcome: Progressing     Problem: Safety Risk - Non-Violent Restraints  Goal: Patient will remain free from self-harm  Description: INTERVENTIONS:  - Apply the least restrictive restraint to prevent harm  - Notify patient and family of reasons restraints applied  - Assess for any contributing factors to confusion (electrolyte disturbances, delirium, medications)  - Discontinue any unnecessary medical devices as soon as possible  - Assess the patient's physical comfort, circulation, skin condition, hydration, nutrition and elimination needs   - Reorient and redirection as needed  - Assess for the need to continue restraints  Outcome: Progressing

## 2024-11-27 ENCOUNTER — APPOINTMENT (OUTPATIENT)
Dept: GENERAL RADIOLOGY | Facility: HOSPITAL | Age: 59
End: 2024-11-27
Attending: NURSE PRACTITIONER
Payer: COMMERCIAL

## 2024-11-27 ENCOUNTER — APPOINTMENT (OUTPATIENT)
Dept: GENERAL RADIOLOGY | Facility: HOSPITAL | Age: 59
DRG: 003 | End: 2024-11-27
Attending: NURSE PRACTITIONER
Payer: COMMERCIAL

## 2024-11-27 PROBLEM — J96.01 ACUTE RESPIRATORY FAILURE WITH HYPOXIA (HCC): Status: ACTIVE | Noted: 2024-11-27

## 2024-11-27 LAB
ANION GAP SERPL CALC-SCNC: 10 MMOL/L (ref 0–18)
ANION GAP SERPL CALC-SCNC: 9 MMOL/L (ref 0–18)
BUN BLD-MCNC: 23 MG/DL (ref 9–23)
BUN BLD-MCNC: 25 MG/DL (ref 9–23)
BUN/CREAT SERPL: 18.5 (ref 10–20)
BUN/CREAT SERPL: 21.1 (ref 10–20)
CALCIUM BLD-MCNC: 8.8 MG/DL (ref 8.7–10.4)
CALCIUM BLD-MCNC: 8.9 MG/DL (ref 8.7–10.4)
CHLORIDE SERPL-SCNC: 112 MMOL/L (ref 98–112)
CHLORIDE SERPL-SCNC: 113 MMOL/L (ref 98–112)
CHOLEST SERPL-MCNC: 119 MG/DL (ref ?–200)
CO2 SERPL-SCNC: 22 MMOL/L (ref 21–32)
CO2 SERPL-SCNC: 22 MMOL/L (ref 21–32)
CREAT BLD-MCNC: 1.09 MG/DL
CREAT BLD-MCNC: 1.35 MG/DL
DEPRECATED RDW RBC AUTO: 50.1 FL (ref 35.1–46.3)
EGFRCR SERPLBLD CKD-EPI 2021: 45 ML/MIN/1.73M2 (ref 60–?)
EGFRCR SERPLBLD CKD-EPI 2021: 58 ML/MIN/1.73M2 (ref 60–?)
ERYTHROCYTE [DISTWIDTH] IN BLOOD BY AUTOMATED COUNT: 16.3 % (ref 11–15)
GLUCOSE BLD-MCNC: 115 MG/DL (ref 70–99)
GLUCOSE BLD-MCNC: 131 MG/DL (ref 70–99)
GLUCOSE BLDC GLUCOMTR-MCNC: 102 MG/DL (ref 70–99)
GLUCOSE BLDC GLUCOMTR-MCNC: 111 MG/DL (ref 70–99)
GLUCOSE BLDC GLUCOMTR-MCNC: 116 MG/DL (ref 70–99)
GLUCOSE BLDC GLUCOMTR-MCNC: 117 MG/DL (ref 70–99)
GLUCOSE BLDC GLUCOMTR-MCNC: 117 MG/DL (ref 70–99)
GLUCOSE BLDC GLUCOMTR-MCNC: 119 MG/DL (ref 70–99)
GLUCOSE BLDC GLUCOMTR-MCNC: 120 MG/DL (ref 70–99)
GLUCOSE BLDC GLUCOMTR-MCNC: 123 MG/DL (ref 70–99)
GLUCOSE BLDC GLUCOMTR-MCNC: 123 MG/DL (ref 70–99)
GLUCOSE BLDC GLUCOMTR-MCNC: 124 MG/DL (ref 70–99)
GLUCOSE BLDC GLUCOMTR-MCNC: 126 MG/DL (ref 70–99)
GLUCOSE BLDC GLUCOMTR-MCNC: 127 MG/DL (ref 70–99)
GLUCOSE BLDC GLUCOMTR-MCNC: 128 MG/DL (ref 70–99)
GLUCOSE BLDC GLUCOMTR-MCNC: 129 MG/DL (ref 70–99)
GLUCOSE BLDC GLUCOMTR-MCNC: 132 MG/DL (ref 70–99)
GLUCOSE BLDC GLUCOMTR-MCNC: 132 MG/DL (ref 70–99)
GLUCOSE BLDC GLUCOMTR-MCNC: 136 MG/DL (ref 70–99)
GLUCOSE BLDC GLUCOMTR-MCNC: 137 MG/DL (ref 70–99)
HCT VFR BLD AUTO: 26.5 %
HDLC SERPL-MCNC: 19 MG/DL (ref 40–59)
HGB BLD-MCNC: 8.5 G/DL
LDLC SERPL CALC-MCNC: 53 MG/DL (ref ?–100)
MCH RBC QN AUTO: 27.2 PG (ref 26–34)
MCHC RBC AUTO-ENTMCNC: 32.1 G/DL (ref 31–37)
MCV RBC AUTO: 84.7 FL
NONHDLC SERPL-MCNC: 100 MG/DL (ref ?–130)
OSMOLALITY SERPL CALC.SUM OF ELEC: 303 MOSM/KG (ref 275–295)
OSMOLALITY SERPL CALC.SUM OF ELEC: 303 MOSM/KG (ref 275–295)
PLATELET # BLD AUTO: 166 10(3)UL (ref 150–450)
POTASSIUM SERPL-SCNC: 3.8 MMOL/L (ref 3.5–5.1)
POTASSIUM SERPL-SCNC: 3.9 MMOL/L (ref 3.5–5.1)
POTASSIUM SERPL-SCNC: 4.2 MMOL/L (ref 3.5–5.1)
RBC # BLD AUTO: 3.13 X10(6)UL
SODIUM SERPL-SCNC: 144 MMOL/L (ref 136–145)
SODIUM SERPL-SCNC: 144 MMOL/L (ref 136–145)
TRIGL SERPL-MCNC: 299 MG/DL (ref 30–149)
VLDLC SERPL CALC-MCNC: 43 MG/DL (ref 0–30)
WBC # BLD AUTO: 9.5 X10(3) UL (ref 4–11)

## 2024-11-27 PROCEDURE — 99233 SBSQ HOSP IP/OBS HIGH 50: CPT | Performed by: HOSPITALIST

## 2024-11-27 PROCEDURE — 99233 SBSQ HOSP IP/OBS HIGH 50: CPT | Performed by: INTERNAL MEDICINE

## 2024-11-27 PROCEDURE — 99291 CRITICAL CARE FIRST HOUR: CPT | Performed by: INTERNAL MEDICINE

## 2024-11-27 PROCEDURE — 71045 X-RAY EXAM CHEST 1 VIEW: CPT | Performed by: NURSE PRACTITIONER

## 2024-11-27 RX ORDER — AMLODIPINE BESYLATE 10 MG/1
10 TABLET ORAL DAILY
Status: DISCONTINUED | OUTPATIENT
Start: 2024-11-28 | End: 2024-12-23

## 2024-11-27 RX ORDER — FUROSEMIDE 10 MG/ML
40 INJECTION INTRAMUSCULAR; INTRAVENOUS 3 TIMES DAILY
Status: DISCONTINUED | OUTPATIENT
Start: 2024-11-27 | End: 2024-11-30

## 2024-11-27 RX ORDER — LOSARTAN POTASSIUM 50 MG/1
50 TABLET ORAL DAILY
Status: DISCONTINUED | OUTPATIENT
Start: 2024-11-27 | End: 2024-12-04

## 2024-11-27 RX ORDER — AMLODIPINE BESYLATE 5 MG/1
5 TABLET ORAL ONCE
Status: COMPLETED | OUTPATIENT
Start: 2024-11-27 | End: 2024-11-27

## 2024-11-27 RX ORDER — POTASSIUM CHLORIDE 1.5 G/1.58G
20 POWDER, FOR SOLUTION ORAL 2 TIMES DAILY
Status: DISCONTINUED | OUTPATIENT
Start: 2024-11-27 | End: 2024-11-30

## 2024-11-27 RX ORDER — SODIUM BICARBONATE 650 MG/1
325 TABLET ORAL AS NEEDED
Status: DISCONTINUED | OUTPATIENT
Start: 2024-11-27 | End: 2024-12-04

## 2024-11-27 NOTE — DIETARY NOTE
ADULT NUTRITION INITIAL ASSESSMENT    Pt is at high nutrition risk.  Pt does not meet malnutrition criteria.      RECOMMENDATIONS TO MD: See Nutrition Intervention for EN specifics     Attempted to discuss with CYNDIE Milan via phone call. RN states Propofol is current running at 45 mcg/kg/min (31.6ml/hr providing 835 kcal from lipids).     RN states IV fluids currently running: D5/NaCl 0.45%. States current rate in chart is accurate (40 ml/hr). IVF status has not been updated in active meds nor in flowsheets since 11/25 @ 0600.     RN states pt continues on insulin drip per protocol \"while pt is intubated.\"     Sent secure chat to APN today to confirm if IVF/insulin drip will continue once tube feedings are started. Awaiting response.     ADMITTING DIAGNOSIS:  Dissection of ascending aorta (HCC)  PERTINENT PAST MEDICAL HISTORY:   Past Medical History:    Chronic kidney disease (CKD)    Esophageal reflux    High blood pressure    High cholesterol    HYPERTENSION    Hypertension    Unspecified essential hypertension     PATIENT STATUS: Initial 11/27/24: RD received consult to initiate tube feedings. Pt is POD #5 emergent aortic dissection and repair. Pt has been intubated, unable to extubate failing SBT's. NPO x 6 days today. Well-nourished. No weight loss, actually some gain since admission, likely r/t post op fluid changes. On Lasix. No pressors. Not at significant risk for refeeding syndrome.     FOOD/NUTRITION RELATED HISTORY:  Appetite: NPO  Intake: NPO  Intake Meeting Needs: NPO  Percent Meals Eaten (last 6 days)       None            Food Allergies: No Known Food Allergies (NKFA)  Cultural/Ethnic/Mormon Preferences: Not Obtained    GASTROINTESTINAL: OG tube, no BM charted since admission    MEDICATIONS: reviewed   furosemide  40 mg Intravenous TID    potassium chloride  20 mEq Oral BID    [START ON 11/28/2024] amLODIPine  10 mg Oral Daily    losartan  50 mg Oral Daily    sodium chloride  3 mL Nebulization 4  times per day    metoclopramide  10 mg Intravenous Q6H    docusate  100 mg Oral BID    albuterol  2.5 mg Nebulization 4 times per day    heparin  5,000 Units Subcutaneous Q8H GABRIEL    sennosides  8.6 mg Oral BID    metoprolol tartrate  25 mg Oral 2x Daily(Beta Blocker)    chlorhexidine gluconate  15 mL Mouth/Throat BID    piperacillin-tazobactam  3.375 g Intravenous Q8H    famotidine  20 mg Oral Daily    Or    famotidine  20 mg Intravenous Daily    aspirin  81 mg Oral Daily     LABS: reviewed Blood sugars well controlled considering insulin gtt running at 1 unit/hr. A1c 6.2% from 9/26/24  Recent Labs     11/24/24  0430 11/25/24  0509 11/25/24  1746 11/26/24  0546 11/26/24  1821 11/27/24  0406 11/27/24  0847   * 137*   < > 124* 131*  --  131*   BUN 32* 29*   < > 25* 24*  --  23   CREATSERUM 1.38* 1.13*   < > 1.12* 1.10*  --  1.09*   CA 8.3* 8.4*   < > 8.6* 8.4*  --  8.9   MG 2.2 2.2  --  2.2  --   --   --     143   < > 144 142  --  144   K 3.3* 3.3*   < > 3.4* 3.4* 3.9 4.2    111   < > 112 111  --  113*   CO2 23.0 23.0   < > 22.0 22.0  --  22.0   PHOS 3.3 3.1  --  3.3  --   --   --    OSMOCALC 307* 304*   < > 304* 300*  --  303*    < > = values in this interval not displayed.     NUTRITION RELATED PHYSICAL FINDINGS:  - Nutrition Focused Physical Exam (NFPE): well nourished  - Fluid Accumulation: +1 Bilateral Feet and generalized ---> See RN documentation for details  - Skin Integrity: surgical wound(s) ---> See RN documentation for details    ANTHROPOMETRICS:  HT: 165.1 cm (5' 5\")  WT: 129.1 kg (284 lb 9.8 oz)   BMI: Body mass index is 47.36 kg/m².  BMI CLASSIFICATION: greater than 40 kg/m2 - morbid obesity class III  IBW: 115 lbs        246% IBW  Usual Body Wt: ~245 lbs in the past per EMR        115% UBW    WEIGHT HISTORY:  Patient Weight(s) for the past 336 hrs:   Weight   11/27/24 0600 129.1 kg (284 lb 9.8 oz)   11/26/24 0621 128 kg (282 lb 3 oz)   11/25/24 0800 125 kg (275 lb 9.2 oz)   11/22/24  0419 117.1 kg (258 lb 2.5 oz)   11/22/24 0200 117.1 kg (258 lb 2.5 oz)     Wt Readings from Last 10 Encounters:   11/27/24 129.1 kg (284 lb 9.8 oz)   10/24/24 115.2 kg (254 lb)   05/17/24 112.9 kg (249 lb)   02/22/24 112.9 kg (249 lb)   12/06/23 112.9 kg (249 lb)   10/09/23 112.5 kg (248 lb)   09/25/23 111.1 kg (245 lb)   08/10/23 111.7 kg (246 lb 4.1 oz)   08/09/23 111.7 kg (246 lb 4.8 oz)   03/25/23 109.3 kg (241 lb)     NUTRITION DIAGNOSIS/PROBLEM:   Inadequate oral intake related to Decreased ability to consume sufficient energy as evidenced by NPO status and no PO intake during admission, and intubation requiring nutrition support.     NUTRITION INTERVENTION:     NUTRITION PRESCRIPTION:   Estimated Nutrition needs: --dosing wt of 117 kg - wt taken on 11/22 - likely close to dry weight.  Calories: 8248-3690 calories/day (15-18 calories per kg Dosing wt)  Hypocaloric while intubated in critical care 12-15 kcal/kg using dosing weight: 8443-0697 kcal   [place]: 2227 kcal  Protein: 62- 104g protein/day (1.2-2 g protein/kg Ideal body wt (IBW))  Fluid Needs: 1ml/kcal or 25ml/kg adjusted weight for obesity= 2075ml (83 kg adjusted)    - Diet:       Procedures    NPO      - Enteral Nutrition: Promote at 10 ml/hr advance by 10 ml q 10 hours to goal rate of 35 ml/hr via OGT. Based on average 22 hour infusion time. Give Prosource 1 pkts BID as med flush. Goal rate + Prosource provides 1685 kcal, 70 grams protein, 736ml free water. Meets 100% of estimated kcal and protein needs. Water flushes 65 ml q 4 hours (390 ml).  Total fluid daily = 1126 ml    Total fluid + IVF = 2086 ml/day. Monitor and adjust based on IVF continuance vs dc.     - RD Care Plan:  Initiated EN  - Medical Food Supplements-RD added NPO. Rational/use of oral supplements discussed.  - Vitamin and mineral supplements: none  - Feeding assistance: NPO  - Nutrition education: not appropriate     - Coordination of nutrition care: collaboration with other  providers  - Discharge and transfer of nutrition care to new setting or provider: monitor plans    MONITOR AND EVALUATE/NUTRITION GOALS:  - Food and Nutrient Intake:      Monitor: for PO initiation when safe  - Food and Nutrient Administration:      Monitor: enteral nutrition initiation, tolerance to enteral nutrition, for enteral nutrition adjustment, and propofol rate  - Anthropometric Measurement:    Monitor weight  - Nutrition Goals:      allow wt loss due to fluid losses, long term gradual wt loss, TF meet >80% of goal within first week , labs within acceptable limits, and monitor fluid status    DIETITIAN FOLLOW UP: RD to follow and monitor nutrition status       Stefany Argueta RD, LDN  Clinical Dietitian  P: [phone]

## 2024-11-27 NOTE — PROGRESS NOTES
[facility]  part of [facility]    Cardiology Progress Note    Alisha Wilde Patient Status:  Inpatient    10/25/1965 MRN [id number]   Location [facility] 2W/SW Attending Aguila Villegas MD   Hosp Day # 5 PCP Bridger Avendano MD     Interval History   Remains intubated , awake  BP have been elevated during awake trial, requiring nitroglycerin gtt    Assessment and Plan:   Acute hypoxic respiratory failure, c/b aspiration event requiring re-intubation  Type A aortic dissection  NAIMA on CKD-III, improved  Obesity  Hypertension  EtOH abuse  -presented with acute onset CP   -CT with type A aortic dissection above right coronary artery and extending distally into the left common iliac artery and external iliac   -s/p emergent successful repair on 24  -had aspiration event following self-extubation, now re-intubated  -will need to review TTE, was obtained on    -continue BP management, currently on nitroglycerin gtt   -start losartan 50mg once daily - improved renal function   -increase amlodipine to 10mg daily   -continue metoprolol tartrate 25mg BID; consider switching to coreg/nebivolol for additional BP lowering  -volume management per nephrology, on lasix  -CIWA protocol  -monitor rhythm on tele    Objective:   Patient Vitals for the past 24 hrs:   BP Temp Temp src Pulse Resp SpO2 Weight   24 1500 119/66 -- -- 70 18 92 % --   24 1400 124/66 -- -- 71 18 95 % --   24 1300 122/66 -- -- 68 18 97 % --   24 1200 124/68 98.4 °F (36.9 °C) Axillary 68 18 97 % --   24 1100 127/75 -- -- 69 18 98 % --   24 1000 129/71 -- -- 69 18 97 % --   24 0900 128/69 -- -- 70 18 97 % --   24 0800 (!) 143/118 97.7 °F (36.5 °C) Temporal 70 18 98 % --   24 0700 130/70 -- -- 69 18 99 % --   24 0621 -- -- -- -- -- -- 282 lb 3 oz (128 kg)   24 0600 95/53 -- -- 68 21 97 % --   24 0500 121/67 -- -- 70 18 97 % --   24 0400 119/68  98.4 °F (36.9 °C) Temporal 72 18 98 % --   11/26/24 0200 109/60 -- -- 72 21 96 % --   11/26/24 0130 -- -- -- 72 18 97 % --   11/26/24 0100 142/73 -- -- 71 18 98 % --   11/26/24 0030 -- -- -- 71 18 99 % --   11/26/24 0000 139/70 98.3 °F (36.8 °C) Temporal 70 18 98 % --   11/25/24 2300 130/69 -- -- 72 18 98 % --   11/25/24 2200 135/70 -- -- 71 18 98 % --   11/25/24 2100 138/71 -- -- 71 18 99 % --   11/25/24 2000 132/68 98.1 °F (36.7 °C) Temporal 69 18 99 % --   11/25/24 1900 124/64 -- -- 71 18 98 % --   11/25/24 1800 136/72 -- -- 71 18 98 % --   11/25/24 1700 138/70 -- -- 70 18 98 % --       Intake/Output:   Last 3 shifts:   Intake/Output                   11/24/24 0700 - 11/25/24 0659 11/25/24 0700 - 11/26/24 0659 11/26/24 0700 - 11/27/24 0659       Intake    P.O.  0  0  --    P.O. 0 0 --    I.V.  2276.7  1884.9  --    I.V. 154 615 --    Volume (mL) Insulin 53.9 37 --    Volume (mL) Nitroglycerin 236.9 54.5 --    Volume (mL) Dobutamine 4.5 -- --    Volume (mL) Propofol 477.8 713.9 --    Volume (mL) Dexmedetomidine 352.2 384.5 --    Volume (mL) (dextrose 5%-sodium chloride 0.45% infusion) 997.4 80 --    NG/GT  50  150  60    Intake (mL) (NG/OG Tube Orogastric 16 Fr. Right mouth) 50 150 60    IV PIGGYBACK  600  500  --    Volume (mL) (piperacillin-tazobactam (Zosyn) 3.375 g in dextrose 5% 100 mL IVPB-ADDV) 300 200 --    Volume (mL) (acetaminophen (Ofirmev) 10 mg/mL infusion premix 1,000 mg) 200 100 --    Volume (mL) (potassium chloride 20 mEq/100mL IVPB premix 20 mEq) -- 100 --    Volume (mL) (potassium chloride 40 mEq/100mL IVPB premix (central line) 40 mEq) 100 100 --    Total Intake 2926.7 2534.9 60       Output    Urine  1800  3570  800    Output (mL) (Urethral Catheter Latex;Temperature probe;Double-lumen) 1800 3570 800    Emesis/NG output  550  365  --    Residual volume (ml) (NG/OG Tube Orogastric 16 Fr. Right mouth) -- 5 --    Output (mL) (NG/OG Tube Orogastric 16 Fr. Right mouth) 550 360 --    Chest Tube  188   125  30    Output (mL) ([REMOVED] Chest Tube Anterior Mediastinal 32 Fr.) 48 50 --    Output (mL) (Chest Tube Anterior Pericardial 24 Fr.) 140 75 30    Total Output 2538 4060 830       Net I/O     388.7 -1525.1 -770             Vent Settings: Vent Mode: VC/AC  FiO2 (%):  [40 %-60 %] 40 %  S RR:  [18] 18  S VT:  [550 mL] 550 mL  PEEP/CPAP (cm H2O):  [5 cm H20] 5 cm H20  MAP (cm H2O):  [11-14] 12    Hemodynamic parameters (last 24 hours):      Scheduled Meds:    furosemide  40 mg Intravenous TID    potassium chloride  20 mEq Oral BID    [START ON 11/28/2024] amLODIPine  10 mg Oral Daily    amLODIPine  5 mg Oral Once    losartan  50 mg Oral Daily    sodium chloride  3 mL Nebulization 4 times per day    metoclopramide  10 mg Intravenous Q6H    docusate  100 mg Oral BID    albuterol  2.5 mg Nebulization 4 times per day    heparin  5,000 Units Subcutaneous Q8H GABRIEL    sennosides  8.6 mg Oral BID    metoprolol tartrate  25 mg Oral 2x Daily(Beta Blocker)    chlorhexidine gluconate  15 mL Mouth/Throat BID    piperacillin-tazobactam  3.375 g Intravenous Q8H    famotidine  20 mg Oral Daily    Or    famotidine  20 mg Intravenous Daily    aspirin  81 mg Oral Daily       Continuous Infusions:    dexmedetomidine 0.2 mcg/kg/hr (11/27/24 0815)    nitroGLYCERIN in dextrose 5% 20 mcg/min (11/27/24 0810)    norepinephrine Stopped (11/23/24 2225)    dextrose 5%-sodium chloride 0.45% 40 mL/hr (11/25/24 0600)    propofol Stopped (11/27/24 0815)    norepinephrine      insulin regular 1 Units/hr (11/27/24 0800)       Results:     Lab Results   Component Value Date    WBC 9.9 11/26/2024    HGB 8.5 (L) 11/26/2024    HCT 26.5 (L) 11/26/2024    .0 (L) 11/26/2024    CREATSERUM 1.10 (H) 11/26/2024    BUN 24 (H) 11/26/2024     11/26/2024    K 3.9 11/27/2024     11/26/2024    CO2 22.0 11/26/2024     (H) 11/26/2024    CA 8.4 (L) 11/26/2024    ALB 3.5 11/26/2024    ALKPHO 42 (L) 11/25/2024    BILT 0.4 11/25/2024    TP 5.4 (L)  11/25/2024    AST 20 11/25/2024    ALT <7 (L) 11/25/2024    PTT 28.2 11/22/2024    INR 1.25 (H) 11/22/2024    TSH 0.704 09/29/2024    NATHALIE 99 11/25/2024    LIP 28 11/25/2024    DDIMER 0.42 12/08/2019    ESRML 15 06/05/2021    MG 2.2 11/26/2024    PHOS 3.3 11/26/2024    TROP <0.045 12/08/2019     (H) 09/23/2021    B12 1,156 (H) 07/23/2018       Recent Labs   Lab 11/25/24  1746 11/26/24  0546 11/26/24  1821 11/27/24  0406   * 124* 131*  --    BUN 26* 25* 24*  --    CREATSERUM 1.15* 1.12* 1.10*  --    CA 8.4* 8.6* 8.4*  --     144 142  --    K 3.7 3.4* 3.4* 3.9    112 111  --    CO2 23.0 22.0 22.0  --      Recent Labs   Lab 11/21/24  1720 11/22/24  0133 11/23/24  0418 11/24/24  0430 11/25/24  0509 11/26/24  0546   RBC 4.58   < > 3.56* 3.14* 3.12* 3.17*   HGB 12.2   < > 9.6* 8.5* 8.3* 8.5*   HCT 38.3   < > 29.9* 26.1* 26.4* 26.5*   MCV 83.6   < > 84.0 83.1 84.6 83.6   MCH 26.6   < > 27.0 27.1 26.6 26.8   MCHC 31.9   < > 32.1 32.6 31.4 32.1   RDW 15.9*   < > 16.4* 16.3* 16.1* 16.2*   NEPRELIM 3.70  --  11.86*  --  7.00  --    WBC 6.4   < > 13.4* 9.3 9.4 9.9   .0   < > 127.0* 106.0* 102.0* 133.0*    < > = values in this interval not displayed.       No results for input(s): \"BNPML\" in the last 168 hours.    No results for input(s): \"TROP\", \"CK\" in the last 168 hours.    XR CHEST AP PORTABLE  (CPT=71045)    Result Date: 11/27/2024  CONCLUSION:   Moderate pulmonary edema, progressed since 11/26/2024.  Lines and tubes in place as described.   Dictated by (CST): Terrence Colorado MD on 11/27/2024 at 7:56 AM     Finalized by (CST): Terrence Colorado MD on 11/27/2024 at 8:00 AM          XR CHEST AP PORTABLE  (CPT=71045)    Result Date: 11/26/2024  CONCLUSION:  1. Cardiomegaly aneurysmal thoracic aorta.  Sternotomy changes 2. ET tube 1.8 cm above the jennyfer .  Recommend 1-2 cm pullback for optimal positioning 3. Remaining support tubes and catheters are satisfactory. 4. Left perihilar bibasilar postoperative  parenchymal abnormality consistent with atelectasis and/or developing consolidation.  Slight progression.  Small bilateral effusions.    Dictated by (CST): Woodrow Fischer MD on 11/26/2024 at 8:32 AM     Finalized by (CST): Woodrow Fischer MD on 11/26/2024 at 8:42 AM          US ABDOMEN COMPLETE (CPT=76700)    Result Date: 11/25/2024  CONCLUSION:  Artifactually degraded examination. Within these parameters: 1. Gallbladder sludge without sonographic evidence acute complication.  2. Nonspecific pericholecystic fluid accumulation, which may be related to the patient's overall protein/volume status.  3. Negative for hepatobiliary dilatation.  4. Sonographic features of hepatic steatosis.  5. Minimally complex right renal cyst and left-sided peripelvic cysts.  6. Lesser incidental findings as above.    Dictated by (CST): Eulalio Shaikh MD on 11/25/2024 at 1:37 PM     Finalized by (CST): Eulalio Shaikh MD on 11/25/2024 at 1:42 PM          XR CHEST AP PORTABLE  (CPT=71045)    Result Date: 11/25/2024  CONCLUSION:  1. Bibasilar patchy opacities are present and may reflect atelectasis, with or without superimposed pneumonia.  2. Postthoracotomy chest with stable cardiomegaly.   3. Additional support tubes and lines as above.    Dictated by (CST): Eulalio Shaikh MD on 11/25/2024 at 11:45 AM     Finalized by (CST): Eulalio Shaikh MD on 11/25/2024 at 11:46 AM          XR ABDOMEN (1 VIEW) (CPT=74018)    Result Date: 11/25/2024  CONCLUSION:  1. Nonspecific nonobstructive bowel gas pattern.  2. Lesser incidental findings as above.    Dictated by (CST): Eulalio Shaikh MD on 11/25/2024 at 11:32 AM     Finalized by (CST): Eulalio Shaikh MD on 11/25/2024 at 11:34 AM          XR CHEST AP PORTABLE  (CPT=71045)    Result Date: 11/25/2024  CONCLUSION:  1. Scattered basilar reticular opacities may reflect atelectasis and/or superimposed pneumonia.  2. Postthoracotomy chest with stable cardiomegaly.  3. Additional support tubes  and lines as above.    Dictated by (CST): Eulalio Shaikh MD on 11/25/2024 at 11:24 AM     Finalized by (CST): Eulalio Shaikh MD on 11/25/2024 at 11:28 AM                    Exam:     Physical Exam:  General: intubated  HEENT: Normocephalic, anicteric sclera, neck supple, no thyromegaly or adenopathy.  Neck: No JVD, carotids 2+, no bruits.  Cardiac: Regular rate and rhythm. S1, S2 normal.   Lungs: Clear without wheezes, rales, rhonchi or dullness.    Abdomen: Soft, non-tender. No organosplenomegally, mass or rebound, BS-present.  Extremities: Without clubbing or cyanosis. No edema.    Neurologic: unable to obtain  Skin: Warm and dry.     Edward Montgomery, [facility] [place]

## 2024-11-27 NOTE — PLAN OF CARE
Respiratory able to wean FiO2 down to 40% overnight. Freq suctioning of very thick/tan secretions required. nitroglycerin weaned down to 10mcg this AM. Sedation in place for ventilator compliance as pt coughing/anxious with increased RR when sedation decreased. Tolerating oral care poorly as pt gets anxious at times with sedation in place. Restraints remain for tube/line safety.     Problem: Diabetes/Glucose Control  Goal: Glucose maintained within prescribed range  Description: INTERVENTIONS:  - Monitor Blood Glucose as ordered  - Assess for signs and symptoms of hyperglycemia and hypoglycemia  - Administer ordered medications to maintain glucose within target range  - Assess barriers to adequate nutritional intake and initiate nutrition consult as needed  - Instruct patient on self management of diabetes  Outcome: Progressing     Problem: CARDIOVASCULAR - ADULT  Goal: Maintains optimal cardiac output and hemodynamic stability  Description: INTERVENTIONS:  - Monitor vital signs, rhythm, and trends  - Monitor for bleeding, hypotension and signs of decreased cardiac output  - Evaluate effectiveness of vasoactive medications to optimize hemodynamic stability  - Monitor arterial and/or venous puncture sites for bleeding and/or hematoma  - Assess quality of pulses, skin color and temperature  - Assess for signs of decreased coronary artery perfusion - ex. Angina  - Evaluate fluid balance, assess for edema, trend weights  Outcome: Progressing  Goal: Absence of cardiac arrhythmias or at baseline  Description: INTERVENTIONS:  - Continuous cardiac monitoring, monitor vital signs, obtain 12 lead EKG if indicated  - Evaluate effectiveness of antiarrhythmic and heart rate control medications as ordered  - Initiate emergency measures for life threatening arrhythmias  - Monitor electrolytes and administer replacement therapy as ordered  Outcome: Progressing     Problem: RESPIRATORY - ADULT  Goal: Achieves optimal ventilation and  oxygenation  Description: INTERVENTIONS:  - Assess for changes in respiratory status  - Assess for changes in mentation and behavior  - Position to facilitate oxygenation and minimize respiratory effort  - Oxygen supplementation based on oxygen saturation or ABGs  - Provide Smoking Cessation handout, if applicable  - Encourage broncho-pulmonary hygiene including cough, deep breathe, Incentive Spirometry  - Assess the need for suctioning and perform as needed  - Assess and instruct to report SOB or any respiratory difficulty  - Respiratory Therapy support as indicated  - Manage/alleviate anxiety  - Monitor for signs/symptoms of CO2 retention  Outcome: Progressing     Problem: GASTROINTESTINAL - ADULT  Goal: Minimal or absence of nausea and vomiting  Description: INTERVENTIONS:  - Maintain adequate hydration with IV or PO as ordered and tolerated  - Nasogastric tube to low intermittent suction as ordered  - Evaluate effectiveness of ordered antiemetic medications  - Provide nonpharmacologic comfort measures as appropriate  - Advance diet as tolerated, if ordered  - Obtain nutritional consult as needed  - Evaluate fluid balance  Outcome: Progressing  Goal: Maintains or returns to baseline bowel function  Description: INTERVENTIONS:  - Assess bowel function  - Maintain adequate hydration with IV or PO as ordered and tolerated  - Evaluate effectiveness of GI medications  - Encourage mobilization and activity  - Obtain nutritional consult as needed  - Establish a toileting routine/schedule  - Consider collaborating with pharmacy to review patient's medication profile  Outcome: Progressing     Problem: METABOLIC/FLUID AND ELECTROLYTES - ADULT  Goal: Glucose maintained within prescribed range  Description: INTERVENTIONS:  - Monitor Blood Glucose as ordered  - Assess for signs and symptoms of hyperglycemia and hypoglycemia  - Administer ordered medications to maintain glucose within target range  - Assess barriers to  adequate nutritional intake and initiate nutrition consult as needed  - Instruct patient on self management of diabetes  Outcome: Progressing  Goal: Electrolytes maintained within normal limits  Description: INTERVENTIONS:  - Monitor labs and rhythm and assess patient for signs and symptoms of electrolyte imbalances  - Administer electrolyte replacement as ordered  - Monitor response to electrolyte replacements, including rhythm and repeat lab results as appropriate  - Fluid restriction as ordered  - Instruct patient on fluid and nutrition restrictions as appropriate  Outcome: Progressing  Goal: Hemodynamic stability and optimal renal function maintained  Description: INTERVENTIONS:  - Monitor labs and assess for signs and symptoms of volume excess or deficit  - Monitor intake, output and patient weight  - Monitor urine specific gravity, serum osmolarity and serum sodium as indicated or ordered  - Monitor response to interventions for patient's volume status, including labs, urine output, blood pressure (other measures as available)  - Encourage oral intake as appropriate  - Instruct patient on fluid and nutrition restrictions as appropriate  Outcome: Progressing     Problem: SKIN/TISSUE INTEGRITY - ADULT  Goal: Skin integrity remains intact  Description: INTERVENTIONS  - Assess and document risk factors for pressure ulcer development  - Assess and document skin integrity  - Monitor for areas of redness and/or skin breakdown  - Initiate interventions, skin care algorithm/standards of care as needed  Outcome: Progressing  Goal: Incision(s), wounds(s) or drain site(s) healing without S/S of infection  Description: INTERVENTIONS:  - Assess and document risk factors for pressure ulcer development  - Assess and document skin integrity  - Assess and document dressing/incision, wound bed, drain sites and surrounding tissue  - Implement wound care per orders  - Initiate isolation precautions as appropriate  - Initiate  Pressure Ulcer prevention bundle as indicated  Outcome: Progressing  Goal: Oral mucous membranes remain intact  Description: INTERVENTIONS  - Assess oral mucosa and hygiene practices  - Implement preventative oral hygiene regimen  - Implement oral medicated treatments as ordered  Outcome: Progressing     Problem: HEMATOLOGIC - ADULT  Goal: Maintains hematologic stability  Description: INTERVENTIONS  - Assess for signs and symptoms of bleeding or hemorrhage  - Monitor labs and vital signs for trends  - Administer supportive blood products/factors, fluids and medications as ordered and appropriate  - Administer supportive blood products/factors as ordered and appropriate  Outcome: Progressing  Goal: Free from bleeding injury  Description: (Example usage: patient with low platelets)  INTERVENTIONS:  - Avoid intramuscular injections, enemas and rectal medication administration  - Ensure safe mobilization of patient  - Hold pressure on venipuncture sites to achieve adequate hemostasis  - Assess for signs and symptoms of internal bleeding  - Monitor lab trends  - Patient is to report abnormal signs of bleeding to staff  - Avoid use of toothpicks and dental floss  - Use electric shaver for shaving  - Use soft bristle tooth brush  - Limit straining and forceful nose blowing  Outcome: Progressing     Problem: Safety Risk - Non-Violent Restraints  Goal: Patient will remain free from self-harm  Description: INTERVENTIONS:  - Apply the least restrictive restraint to prevent harm  - Notify patient and family of reasons restraints applied  - Assess for any contributing factors to confusion (electrolyte disturbances, delirium, medications)  - Discontinue any unnecessary medical devices as soon as possible  - Assess the patient's physical comfort, circulation, skin condition, hydration, nutrition and elimination needs   - Reorient and redirection as needed  - Assess for the need to continue restraints  Outcome: Not Progressing

## 2024-11-27 NOTE — PROGRESS NOTES
Pulmonary/ICU/Critical Care Progress Note        Reason for Consultation: post op care  Referring Physician: Dr. Gregg    Seen this am.     SUBJECTIVE:  Afebrile on lower vent settings.  Awake       ALLERGIES:  Allergies[1]      MEDS:  Home Medications:  Medications Taking[2]    Scheduled Medication:   furosemide  40 mg Intravenous TID    potassium chloride  20 mEq Oral BID    sodium chloride  3 mL Nebulization 4 times per day    amLODIPine  5 mg Oral Daily    metoclopramide  10 mg Intravenous Q6H    docusate  100 mg Oral BID    albuterol  2.5 mg Nebulization 4 times per day    heparin  5,000 Units Subcutaneous Q8H GABRIEL    sennosides  8.6 mg Oral BID    metoprolol tartrate  25 mg Oral 2x Daily(Beta Blocker)    chlorhexidine gluconate  15 mL Mouth/Throat BID    piperacillin-tazobactam  3.375 g Intravenous Q8H    famotidine  20 mg Oral Daily    Or    famotidine  20 mg Intravenous Daily    aspirin  81 mg Oral Daily     Continuous Infusing Medication:   dexmedetomidine 1 mcg/kg/hr (11/27/24 0630)    nitroGLYCERIN in dextrose 5% 10 mcg/min (11/27/24 0600)    norepinephrine Stopped (11/23/24 2225)    dextrose 5%-sodium chloride 0.45% 40 mL/hr (11/25/24 0600)    propofol 45 mcg/kg/min (11/27/24 0709)    norepinephrine      insulin regular 0.5 Units/hr (11/26/24 2200)     PRN Medications:    ipratropium-albuterol    HYDROcodone-acetaminophen    melatonin    polyethylene glycol (PEG 3350)    bisacodyl    ondansetron    nitroGLYCERIN in dextrose 5%    norepinephrine    potassium chloride **OR** potassium chloride    magnesium sulfate in dextrose 5%    magnesium sulfate in sterile water for injection    morphINE **OR** [DISCONTINUED] morphINE    fentaNYL       PHYSICAL EXAM:  /70 (BP Location: Right arm)   Pulse 68   Temp 97.5 °F (36.4 °C) (Temporal)   Resp 18   Ht 5' 5\" (1.651 m)   Wt 284 lb 9.8 oz (129.1 kg)   SpO2 99%   BMI 47.36 kg/m²   Vent Mode: VC/AC  FiO2 (%):  [40 %-60 %] 40 %  S RR:  [18] 18  S VT:  [550  mL] 550 mL  PEEP/CPAP (cm H2O):  [5 cm H20] 5 cm H20  MAP (cm H2O):  [11-14] 12    CONSTITUTIONAL: intubated, sedated, eyes open  HEENT: atraumatic normocephalic  MOUTH: ETT, OGT  NECK/THROAT: no JVD. Trachea midline. No obvious thyromegaly. +right swan  LUNG: vented upper clear BS b/l no wheezing, + crackles. Diminished at bases. Chest symmetric with respiratory motion. + midsternal surgical wound. + chest tube  HEART: regular rate and rhythm, no obvious murmers or gallops noted  ABD: soft tender right side. + bowel sounds. No organomegaly noted  EXT: no clubbing, cyanosis, or edema noted. Pulses intact grossly  NEURO/MUSCULOSKELETAL: intubated sedated   SKIN: warm, dry. No obvious lesions noted  LYMPH: no obvious LAD      IMAGES:   CXR 11/27/24  Moderate pulmonary edema, progressed since 11/26/2024.     CT c/a/p  CONCLUSION:  Low-lying ETT, 0.8 cm above the jennyfer   Multifocal bilateral pulmonary nodular consolidation s    CXR 11/24/24  FINDINGS:   CARDIAC/VASC: The cardiac silhouette is exaggerated by AP portable technique. There is stable central pulmonary venous congestion.   MEDIAST/HAMLET:   No visible mass or adenopathy.   LUNGS/PLEURA: There are stable streaky opacities at the right lung base.  No pleural effusion or pneumothorax.   BONES: Sternotomy changes are again noted.   OTHER: An endotracheal tube tip projects 3.8 cm above the jennyfer.  An enteric tube again courses into the left upper quadrant.  A right internal jugular approach [place]-Dev catheter tip again projects over the pulmonary outflow tract.  A mediastinal drain tip again projects over the right suprahilar region.     CXR 11/23/24  On my read possible RML infiltrates  CONCLUSION: Post emergent acute type A aortic dissection repair.  Stable cardiomegaly.  Gross stable/satisfactory position of lines and tubes.  Mild pulmonary vascular congestion.       CXR 11/22/24  CONCLUSION: Post feeding tube placement with tip in the distal esophagus  approximately 4 cm above the gastroesophageal junction.  Recommend advancement of the tube by approximately 10 cm to place it in the stomach.     CXR 11/22/24  CARDIAC/MEDIASTINUM: The cardiac silhouette is enlarged and unchanged.. There is a right internal jugular [place]-Dev catheter with tip at the level of the main pulmonary artery. There is a right-sided chest tube. Endotracheal tube with tip approximately    5.3 cm above the jennyfer. There is a nasogastric tube with tip and sidehole within the stomach and below the diaphragm. There are median sternotomy wires and postoperative changes of ascending aortic repair.   LUNGS: There is pulmonary vascular redistribution without edema. Minimal blunting of the bilateral costophrenic angles may be secondary to trace pleural effusions versus chronic pleural thickening. There is mild bibasilar atelectasis. No pneumothorax.   BONES: There is degenerative disease of the thoracic spine.     Chest CTA 11/22/24  CONCLUSION:   1. Type a aortic dissection beginning just above right renal (correction:  Right coronary)  artery and extending distally into the left common iliac artery and external iliac.  Findings were called to Dr. Echavarria at 5:52 p.m.       LABS:  Recent Labs   Lab 11/21/24  1720 11/22/24  0133 11/23/24  0418 11/24/24  0430 11/25/24  0509 11/26/24  0546   RBC 4.58   < > 3.56* 3.14* 3.12* 3.17*   HGB 12.2   < > 9.6* 8.5* 8.3* 8.5*   HCT 38.3   < > 29.9* 26.1* 26.4* 26.5*   MCV 83.6   < > 84.0 83.1 84.6 83.6   MCH 26.6   < > 27.0 27.1 26.6 26.8   MCHC 31.9   < > 32.1 32.6 31.4 32.1   RDW 15.9*   < > 16.4* 16.3* 16.1* 16.2*   NEPRELIM 3.70  --  11.86*  --  7.00  --    WBC 6.4   < > 13.4* 9.3 9.4 9.9   .0   < > 127.0* 106.0* 102.0* 133.0*    < > = values in this interval not displayed.       Recent Labs   Lab 11/21/24  1720 11/22/24  0251 11/23/24  0418 11/24/24  0430 11/25/24  0509 11/25/24  1746 11/26/24  0546 11/26/24  1821 11/27/24  0406   *   < > 142*  132* 137* 119* 124* 131*  --    BUN 21   < > 32* 32* 29* 26* 25* 24*  --    CREATSERUM 1.19*   < > 2.28* 1.38* 1.13* 1.15* 1.12* 1.10*  --    EGFRCR 52*   < > 24* 44* 55* 54* 56* 57*  --    CA 9.6   < > 8.4* 8.3* 8.4* 8.4* 8.6* 8.4*  --    ALB 4.3  --  3.4 3.4 3.5  --  3.5  --   --    *   < > 144 144 143 143 144 142  --    K 3.4*   < > 4.2 3.3* 3.3* 3.7 3.4* 3.4* 3.9      < > 112 111 111 111 112 111  --    CO2 28.0   < > 21.0 23.0 23.0 23.0 22.0 22.0  --    ALKPHO 90  --  49*  --  42*  --   --   --   --    AST 27  --  33  --  20  --   --   --   --    ALT 22  --  9*  --  <7*  --   --   --   --    BILT 0.4  --  0.3  --  0.4  --   --   --   --    TP 6.9  --  5.3*  --  5.4*  --   --   --   --     < > = values in this interval not displayed.       ASSESSMENT/PLAN:  Type A aortic dissection s/p repair now intubated in ICU  -on ntg gtt as per CV surgery  -chest tube management as per CV    Post op acute respiratory failure  -complicated by extubation and reimergent intubation and sign emesis concerning for aspiration PNA vs pneumonitis  -started on zosyn-continue  -SBT this am. If passes then plan to extubate  -has a + cuff leak per RT  -hx of likely TANYA and previous smoking hx. Has sign dense consolidations and atelectasis on chest CT. Will benefit from NIV post extubation. Would try BIPAP/AVAPS post extubation and monitor closely  -PRN ABG and CXR    Previous hx of smoking and ETOH  -continue duonebs PRN    NAIMA on CKD  -likely from surgery  -monitor UO and renal labs both are improving   -D5 0.45  -renal following     BG  -insulin gtt, D5 and accuchecks    Abd pain  -s/p abd CT as above    Proph:  -DVT: hep sq    Dispo:  -full code  -discussed with CV surgery     Critical care time 32 min independent of procedures      Thank you for the opportunity to care for Alisha WildeShantell Rainey DO, MPH  Pulmonary Critical Care Medicine  [facility] Pulmonary and Critical Care Medicine                        [1]   Allergies  Allergen Reactions    Atorvastatin MYALGIA    Pravastatin MYALGIA    Rosuvastatin MYALGIA    Seasonal Runny nose   [2]   Outpatient Medications Marked as Taking for the 11/21/24 encounter (Hospital Encounter)   Medication Sig Dispense Refill    Acetaminophen ER (TYLENOL 8 HOUR) 650 MG Oral Tab CR Take 2 tablets (1,300 mg total) by mouth daily as needed.      Calcium Carb-Cholecalciferol 600-10 MG-MCG Oral Tab Take 1 tablet by mouth daily.      metoprolol succinate ER 50 MG Oral Tablet 24 Hr Take 1 tablet (50 mg total) by mouth daily. 90 tablet 3    Losartan Potassium-HCTZ 100-12.5 MG Oral Tab Take 1 tablet by mouth daily. 90 tablet 3    Potassium Chloride ER 20 MEQ Oral Tab CR Take 1 tablet by mouth daily. 90 tablet 3    albuterol 108 (90 Base) MCG/ACT Inhalation Aero Soln Inhale 2 puffs into the lungs every 4 (four) hours as needed for Wheezing. 3 each 3    amLODIPine 10 MG Oral Tab Take 1 tablet (10 mg total) by mouth daily. 90 tablet 3    Ascorbic Acid (VITAMIN C) 1000 MG Oral Tab Take 1 tablet (1,000 mg total) by mouth daily.      vitamin B-12 50 MCG Oral Tab Take 1 tablet (50 mcg total) by mouth daily.

## 2024-11-27 NOTE — PLAN OF CARE
Patient failed SBT this AM. TF started. BM X1.     Problem: Diabetes/Glucose Control  Goal: Glucose maintained within prescribed range  Description: INTERVENTIONS:  - Monitor Blood Glucose as ordered  - Assess for signs and symptoms of hyperglycemia and hypoglycemia  - Administer ordered medications to maintain glucose within target range  - Assess barriers to adequate nutritional intake and initiate nutrition consult as needed  - Instruct patient on self management of diabetes  Outcome: Progressing     Problem: CARDIOVASCULAR - ADULT  Goal: Maintains optimal cardiac output and hemodynamic stability  Description: INTERVENTIONS:  - Monitor vital signs, rhythm, and trends  - Monitor for bleeding, hypotension and signs of decreased cardiac output  - Evaluate effectiveness of vasoactive medications to optimize hemodynamic stability  - Monitor arterial and/or venous puncture sites for bleeding and/or hematoma  - Assess quality of pulses, skin color and temperature  - Assess for signs of decreased coronary artery perfusion - ex. Angina  - Evaluate fluid balance, assess for edema, trend weights  Outcome: Progressing  Goal: Absence of cardiac arrhythmias or at baseline  Description: INTERVENTIONS:  - Continuous cardiac monitoring, monitor vital signs, obtain 12 lead EKG if indicated  - Evaluate effectiveness of antiarrhythmic and heart rate control medications as ordered  - Initiate emergency measures for life threatening arrhythmias  - Monitor electrolytes and administer replacement therapy as ordered  Outcome: Progressing     Problem: GASTROINTESTINAL - ADULT  Goal: Minimal or absence of nausea and vomiting  Description: INTERVENTIONS:  - Maintain adequate hydration with IV or PO as ordered and tolerated  - Nasogastric tube to low intermittent suction as ordered  - Evaluate effectiveness of ordered antiemetic medications  - Provide nonpharmacologic comfort measures as appropriate  - Advance diet as tolerated, if  ordered  - Obtain nutritional consult as needed  - Evaluate fluid balance  Outcome: Progressing  Goal: Maintains or returns to baseline bowel function  Description: INTERVENTIONS:  - Assess bowel function  - Maintain adequate hydration with IV or PO as ordered and tolerated  - Evaluate effectiveness of GI medications  - Encourage mobilization and activity  - Obtain nutritional consult as needed  - Establish a toileting routine/schedule  - Consider collaborating with pharmacy to review patient's medication profile  Outcome: Progressing     Problem: METABOLIC/FLUID AND ELECTROLYTES - ADULT  Goal: Glucose maintained within prescribed range  Description: INTERVENTIONS:  - Monitor Blood Glucose as ordered  - Assess for signs and symptoms of hyperglycemia and hypoglycemia  - Administer ordered medications to maintain glucose within target range  - Assess barriers to adequate nutritional intake and initiate nutrition consult as needed  - Instruct patient on self management of diabetes  Outcome: Progressing  Goal: Hemodynamic stability and optimal renal function maintained  Description: INTERVENTIONS:  - Monitor labs and assess for signs and symptoms of volume excess or deficit  - Monitor intake, output and patient weight  - Monitor urine specific gravity, serum osmolarity and serum sodium as indicated or ordered  - Monitor response to interventions for patient's volume status, including labs, urine output, blood pressure (other measures as available)  - Encourage oral intake as appropriate  - Instruct patient on fluid and nutrition restrictions as appropriate  Outcome: Progressing     Problem: SKIN/TISSUE INTEGRITY - ADULT  Goal: Skin integrity remains intact  Description: INTERVENTIONS  - Assess and document risk factors for pressure ulcer development  - Assess and document skin integrity  - Monitor for areas of redness and/or skin breakdown  - Initiate interventions, skin care algorithm/standards of care as  needed  Outcome: Progressing  Goal: Incision(s), wounds(s) or drain site(s) healing without S/S of infection  Description: INTERVENTIONS:  - Assess and document risk factors for pressure ulcer development  - Assess and document skin integrity  - Assess and document dressing/incision, wound bed, drain sites and surrounding tissue  - Implement wound care per orders  - Initiate isolation precautions as appropriate  - Initiate Pressure Ulcer prevention bundle as indicated  Outcome: Progressing  Goal: Oral mucous membranes remain intact  Description: INTERVENTIONS  - Assess oral mucosa and hygiene practices  - Implement preventative oral hygiene regimen  - Implement oral medicated treatments as ordered  Outcome: Progressing     Problem: HEMATOLOGIC - ADULT  Goal: Maintains hematologic stability  Description: INTERVENTIONS  - Assess for signs and symptoms of bleeding or hemorrhage  - Monitor labs and vital signs for trends  - Administer supportive blood products/factors, fluids and medications as ordered and appropriate  - Administer supportive blood products/factors as ordered and appropriate  Outcome: Progressing  Goal: Free from bleeding injury  Description: (Example usage: patient with low platelets)  INTERVENTIONS:  - Avoid intramuscular injections, enemas and rectal medication administration  - Ensure safe mobilization of patient  - Hold pressure on venipuncture sites to achieve adequate hemostasis  - Assess for signs and symptoms of internal bleeding  - Monitor lab trends  - Patient is to report abnormal signs of bleeding to staff  - Avoid use of toothpicks and dental floss  - Use electric shaver for shaving  - Use soft bristle tooth brush  - Limit straining and forceful nose blowing  Outcome: Progressing     Problem: NEUROLOGICAL - ADULT  Goal: Achieves stable or improved neurological status  Description: INTERVENTIONS  - Assess for and report changes in neurological status  - Initiate measures to prevent  increased intracranial pressure  - Maintain blood pressure and fluid volume within ordered parameters to optimize cerebral perfusion and minimize risk of hemorrhage  - Monitor temperature, glucose, and sodium. Initiate appropriate interventions as ordered  Outcome: Progressing     Problem: Safety Risk - Non-Violent Restraints  Goal: Patient will remain free from self-harm  Description: INTERVENTIONS:  - Apply the least restrictive restraint to prevent harm  - Notify patient and family of reasons restraints applied  - Assess for any contributing factors to confusion (electrolyte disturbances, delirium, medications)  - Discontinue any unnecessary medical devices as soon as possible  - Assess the patient's physical comfort, circulation, skin condition, hydration, nutrition and elimination needs   - Reorient and redirection as needed  - Assess for the need to continue restraints  Outcome: Progressing     Problem: RESPIRATORY - ADULT  Goal: Achieves optimal ventilation and oxygenation  Description: INTERVENTIONS:  - Assess for changes in respiratory status  - Assess for changes in mentation and behavior  - Position to facilitate oxygenation and minimize respiratory effort  - Oxygen supplementation based on oxygen saturation or ABGs  - Provide Smoking Cessation handout, if applicable  - Encourage broncho-pulmonary hygiene including cough, deep breathe, Incentive Spirometry  - Assess the need for suctioning and perform as needed  - Assess and instruct to report SOB or any respiratory difficulty  - Respiratory Therapy support as indicated  - Manage/alleviate anxiety  - Monitor for signs/symptoms of CO2 retention  Outcome: Not Progressing

## 2024-11-27 NOTE — PROGRESS NOTES
[facility]  part of [facility]    Progress Note      Subjective:     Patient currently receiving trial of breathing-bit agitated.  Off sedation.  Webb with clear urine.      Review of Systems:     Unable to obtain    Objective:   Temp:  [97.5 °F (36.4 °C)-98.4 °F (36.9 °C)] 98 °F (36.7 °C)  Pulse:  [67-81] 72  Resp:  [18-27] 25  BP: ()/(52-75) 136/67  SpO2:  [90 %-100 %] 100 %  AO: (105-157)/(38-64) 157/57  FiO2 (%):  [40 %-60 %] 40 %  SpO2: 100 %     Intake/Output Summary (Last 24 hours) at 11/27/2024 1024  Last data filed at 11/27/2024 0600  Gross per 24 hour   Intake 3038 ml   Output 2560 ml   Net 478 ml     Wt Readings from Last 3 Encounters:   11/27/24 284 lb 9.8 oz (129.1 kg)   10/24/24 254 lb (115.2 kg)   05/17/24 249 lb (112.9 kg)       General appearance: Intubated and awake  Head: Normocephalic, atraumatic  Neck:  no JVD, supple, symmetrical  Skin: No rashes or lesions  Neurologic: unable to examine   Webb with clear urine    Medications:  Current Facility-Administered Medications   Medication Dose Route Frequency    furosemide (Lasix) 10 mg/mL injection 40 mg  40 mg Intravenous TID    potassium chloride (Klor-Con) 20 MEQ oral powder 20 mEq  20 mEq Oral BID    [START ON 11/28/2024] amLODIPine (Norvasc) tab 10 mg  10 mg Oral Daily    losartan (Cozaar) tab 50 mg  50 mg Oral Daily    sodium chloride 3 % nebulizer solution 3 mL  3 mL Nebulization 4 times per day    ipratropium-albuterol (Duoneb) 0.5-2.5 (3) MG/3ML inhalation solution 3 mL  3 mL Nebulization Q6H PRN    metoclopramide (Reglan) 5 mg/mL injection 10 mg  10 mg Intravenous Q6H    docusate (Colace) 50 MG/5ML oral solution 100 mg  100 mg Oral BID    albuterol (Ventolin) (2.5 MG/3ML) 0.083% nebulizer solution 2.5 mg  2.5 mg Nebulization 4 times per day    HYDROcodone-acetaminophen (Norco) 5-325 MG per tab 2 tablet  2 tablet Oral Q4H PRN    heparin (Porcine) 5000 UNIT/ML injection 5,000 Units  5,000 Units Subcutaneous Q8H  GABRIEL    melatonin tab 3 mg  3 mg Oral Nightly PRN    sennosides (Senokot) tab 8.6 mg  8.6 mg Oral BID    polyethylene glycol (PEG 3350) (Miralax) 17 g oral packet 17 g  17 g Oral Daily PRN    bisacodyl (Dulcolax) 10 MG rectal suppository 10 mg  10 mg Rectal Daily PRN    ondansetron (Zofran) 4 MG/2ML injection 4 mg  4 mg Intravenous Q6H PRN    dexmedeTOMIDine in sodium chloride 0.9% (Precedex) 400 mcg/100mL infusion premix  0.2-1.5 mcg/kg/hr (Dosing Weight) Intravenous Continuous    nitroGLYCERIN in dextrose 5% 50 mg/250mL infusion premix  5-300 mcg/min Intravenous Continuous PRN    norepinephrine (Levophed) 4 mg/250mL infusion premix  0.5-30 mcg/min Intravenous Continuous PRN    metoprolol tartrate (Lopressor) tab 25 mg  25 mg Oral 2x Daily(Beta Blocker)    potassium chloride 20 mEq/100mL IVPB premix 20 mEq  20 mEq Intravenous PRN    Or    potassium chloride 40 mEq/100mL IVPB premix (central line) 40 mEq  40 mEq Intravenous PRN    magnesium sulfate in dextrose 5% 1 g/100mL infusion premix 1 g  1 g Intravenous PRN    magnesium sulfate in sterile water for injection 2 g/50mL IVPB premix 2 g  2 g Intravenous PRN    chlorhexidine gluconate (Peridex) 0.12 % oral solution 15 mL  15 mL Mouth/Throat BID    dextrose 5%-sodium chloride 0.45% infusion   mL/hr Intravenous Continuous    morphINE PF 2 MG/ML injection 2 mg  2 mg Intravenous Q2H PRN    propofol (Diprivan) 10 mg/mL infusion premix  5-50 mcg/kg/min (Dosing Weight) Intravenous Continuous    fentaNYL (Sublimaze) 50 mcg/mL injection 25 mcg  25 mcg Intravenous Q3H PRN    piperacillin-tazobactam (Zosyn) 3.375 g in dextrose 5% 100 mL IVPB-ADDV  3.375 g Intravenous Q8H    famotidine (Pepcid) tab 20 mg  20 mg Oral Daily    Or    famotidine (Pepcid) 20 mg/2mL injection 20 mg  20 mg Intravenous Daily    aspirin chewable tab 81 mg  81 mg Oral Daily    norepinephrine (Levophed) 4 mg/250mL infusion premix  0.5-30 mcg/min Intravenous Continuous    insulin regular human  (Novolin R, Humulin R) 100 Units in sodium chloride 0.9% 100 mL standard infusion (100 mL)  1-40 Units/hr Intravenous Continuous          Results:     Recent Labs   Lab 11/21/24  1720 11/22/24  0133 11/23/24  0418 11/24/24  0430 11/25/24  0509 11/26/24  0546 11/27/24  0847   RBC 4.58   < > 3.56*   < > 3.12* 3.17* 3.13*   HGB 12.2   < > 9.6*   < > 8.3* 8.5* 8.5*   HCT 38.3   < > 29.9*   < > 26.4* 26.5* 26.5*   MCV 83.6   < > 84.0   < > 84.6 83.6 84.7   NEPRELIM 3.70  --  11.86*  --  7.00  --   --    WBC 6.4   < > 13.4*   < > 9.4 9.9 9.5   .0   < > 127.0*   < > 102.0* 133.0* 166.0    < > = values in this interval not displayed.     Recent Labs   Lab 11/21/24  1720 11/22/24  0251 11/23/24  0418 11/24/24  0430 11/25/24  0509 11/25/24  1746 11/26/24  0546 11/26/24  1821 11/27/24  0406 11/27/24  0847   *   < > 142* 132* 137*   < > 124* 131*  --  131*   BUN 21   < > 32* 32* 29*   < > 25* 24*  --  23   CREATSERUM 1.19*   < > 2.28* 1.38* 1.13*   < > 1.12* 1.10*  --  1.09*   CA 9.6   < > 8.4* 8.3* 8.4*   < > 8.6* 8.4*  --  8.9   ALB 4.3  --  3.4 3.4 3.5  --  3.5  --   --   --    *   < > 144 144 143   < > 144 142  --  144   K 3.4*   < > 4.2 3.3* 3.3*   < > 3.4* 3.4* 3.9 4.2      < > 112 111 111   < > 112 111  --  113*   CO2 28.0   < > 21.0 23.0 23.0   < > 22.0 22.0  --  22.0   ALKPHO 90  --  49*  --  42*  --   --   --   --   --    AST 27  --  33  --  20  --   --   --   --   --    ALT 22  --  9*  --  <7*  --   --   --   --   --    BILT 0.4  --  0.3  --  0.4  --   --   --   --   --    TP 6.9  --  5.3*  --  5.4*  --   --   --   --   --     < > = values in this interval not displayed.     PTT   Date Value Ref Range Status   11/22/2024 28.2 23.0 - 36.0 seconds Final     INR   Date Value Ref Range Status   11/22/2024 1.25 (H) 0.80 - 1.20 Final     Comment:     Therapeutic INR range for patients on warfarin:  2.0-3.0 for most medical conditions and surgical prophylaxis   2.5-3.5 for mechanical heart valves  and recurrent thromboembolism    Direct thrombin inhibitors (e.g. argatroban, bivalirudin), factor Xa inhibitors (e.g. apixaban, rivaroxaban), and conditions such as coagulation factor deficiency, vitamin K deficiency, and liver disease will prolong the prothrombin time/INR.    Unfractionated heparin and low molecular weight heparin do not affect the prothrombin time/INR up to a concentration of 1 IU/mL and 1.5 IU/mL, respectively.         No results for input(s): \"BNP\" in the last 168 hours.  Recent Labs   Lab 11/24/24  0430 11/25/24  0509 11/26/24  0546   MG 2.2 2.2 2.2   PHOS 3.3 3.1 3.3        Recent Labs   Lab 11/24/24  0430 11/25/24  0509 11/26/24  0546   PHOS 3.3 3.1 3.3   ALB 3.4 3.5 3.5       XR CHEST AP PORTABLE  (CPT=71045)    Result Date: 11/27/2024  CONCLUSION:   Moderate pulmonary edema, progressed since 11/26/2024.  Lines and tubes in place as described.   Dictated by (CST): Terrence Colorado MD on 11/27/2024 at 7:56 AM     Finalized by (CST): Terrence Colorado MD on 11/27/2024 at 8:00 AM          XR CHEST AP PORTABLE  (CPT=71045)    Result Date: 11/26/2024  CONCLUSION:  1. Cardiomegaly aneurysmal thoracic aorta.  Sternotomy changes 2. ET tube 1.8 cm above the jennyfer .  Recommend 1-2 cm pullback for optimal positioning 3. Remaining support tubes and catheters are satisfactory. 4. Left perihilar bibasilar postoperative parenchymal abnormality consistent with atelectasis and/or developing consolidation.  Slight progression.  Small bilateral effusions.    Dictated by (CST): Woodrow Fischer MD on 11/26/2024 at 8:32 AM     Finalized by (CST): Woodrow Fischer MD on 11/26/2024 at 8:42 AM          US ABDOMEN COMPLETE (CPT=76700)    Result Date: 11/25/2024  CONCLUSION:  Artifactually degraded examination. Within these parameters: 1. Gallbladder sludge without sonographic evidence acute complication.  2. Nonspecific pericholecystic fluid accumulation, which may be related to the patient's overall protein/volume  status.  3. Negative for hepatobiliary dilatation.  4. Sonographic features of hepatic steatosis.  5. Minimally complex right renal cyst and left-sided peripelvic cysts.  6. Lesser incidental findings as above.    Dictated by (CST): Eulalio Shaikh MD on 11/25/2024 at 1:37 PM     Finalized by (CST): Eulalio Shaikh MD on 11/25/2024 at 1:42 PM          XR ABDOMEN (1 VIEW) (CPT=74018)    Result Date: 11/25/2024  CONCLUSION:  1. Nonspecific nonobstructive bowel gas pattern.  2. Lesser incidental findings as above.    Dictated by (CST): Eulalio Shaikh MD on 11/25/2024 at 11:32 AM     Finalized by (CST): Eulalio Shaikh MD on 11/25/2024 at 11:34 AM          XR CHEST AP PORTABLE  (CPT=71045)    Result Date: 11/25/2024  CONCLUSION:  1. Scattered basilar reticular opacities may reflect atelectasis and/or superimposed pneumonia.  2. Postthoracotomy chest with stable cardiomegaly.  3. Additional support tubes and lines as above.    Dictated by (CST): Eulalio Shaikh MD on 11/25/2024 at 11:24 AM     Finalized by (CST): Eulalio Shaikh MD on 11/25/2024 at 11:28 AM               Assessment and Plan:       1.NAIMA on CKD stage III: Nonoliguric on diuretic  NAIMA secondary to MARY LOU/ATN  Improved kidney function creatinine stable at 1.1 mg/dL for last few days  Continue to monitor with diuretics    2.aortic dissection: S/p emergent aortic dissection repair  CT surgery input noted  S/p CT chest abdomen pelvis-multifocal bilateral pulmonary nodular consolidation.  No focal fluid collection at surgery site.    3.respiratory failure: Improve oxygen requirement and currently on 40% FiO2  Currently on 40 mg IV 3 times daily  Chest x-ray from today noted to have moderate pulmonary edema    4 -renal cyst  The renal ultrasound showed a mildly complex cyst in the right kidney.  This will need to be followed up with surveillance ultrasound    Discussed with nursing         Robbi Resendiz MD

## 2024-11-27 NOTE — PROGRESS NOTES
[facility]  part of [facility]    Progress Note    Alisha Wilde Patient Status:  Inpatient    10/25/1965 MRN [id number]   Location [facility] 2W/SW Attending Aguila Villegas MD   Hosp Day # 5 PCP Bridger Avendano MD     Subjective:  Pt intubated/sedated currently on full vent support. No visitors at bedside.     Objective:  /70 (BP Location: Right arm)   Pulse 68   Temp 97.5 °F (36.4 °C) (Temporal)   Resp 18   Ht 5' 5\" (1.651 m)   Wt 284 lb 9.8 oz (129.1 kg)   SpO2 99%   BMI 47.36 kg/m²     Temp (24hrs), Av.9 °F (36.6 °C), Min:97.5 °F (36.4 °C), Max:98.4 °F (36.9 °C)  Tele - NSR    Intake/Output:    Intake/Output Summary (Last 24 hours) at 2024 0733  Last data filed at 2024 0600  Gross per 24 hour   Intake 3098 ml   Output 2560 ml   Net 538 ml       Wt Readings from Last 3 Encounters:   24 284 lb 9.8 oz (129.1 kg)   10/24/24 254 lb (115.2 kg)   24 249 lb (112.9 kg)       Allergies:  Allergies[1]    Labs:  Lab Results   Component Value Date    CREATSERUM 1.10 2024    BUN 24 2024     2024    K 3.9 2024     2024    CO2 22.0 2024     2024    CA 8.4 2024       Physical Exam:  Blood pressure 135/70, pulse 68, temperature 97.5 °F (36.4 °C), temperature source Temporal, resp. rate 18, height 5' 5\" (1.651 m), weight 284 lb 9.8 oz (129.1 kg), SpO2 99%, not currently breastfeeding.  General: NAD  Lungs: mildly coarse w/expiratory wheezing  Mediastinal Chest tube=20cc/12 hours-no air leak   Heart: RRR, S1, S2  Abdomen: slightly firm/obese/mildly distended, BS + x 4, + BM  OGT to LIS=60cc/12 hours  Extremities: Warm, dry, 1+ generalized UE & LE edema bilat  Pulses: 2+ bilat DP  Skin: sternotomy incision w/Provena drsg: CDI. Left femoral cannulation site w/drsg: CDI   Neurological:  sedated    Assessment/Plan:  S/P EMERGENT AORTIC DISSECTION REPAIR POD # 5  Respiratory Failure-currently  on 40% full vent support. Wean as evelyn/extubate. Mgt per Pulm. Re-intubated on 11/22 per Pulm after emesis episode/ETT removal due to emesis content noted in airway.  CXR pending this a.m.  ABX for suspected aspiration pneumonia.   Obtain CT Chest/Abdomen/Pelvis (non-contrast) 11/26-no obstruction noted, abdomen stable.  KUB 11/25=nonobstructive bowel pattern. Large liquid BM today - d/c Colace, continue Reglan  HTN: on NTG-SBP goal= <130/MAP >70. Mgt per Cards  Pain meds as needed  Scds/Heparin SubQ prophylaxis DVT prevention. HIPA 11/25=negative. Plts continue >100,000  Continue ICU status  Remove pericardial Tanner drain today  Hx: NAIMA on CKD-limit/avoid nephrotoxic meds. Nephrology following-appreciate recs.   Expected post op volume overload: continues on Lasix bid -increased to 3 times daily today, weight still up trending/+6 L since surgery.    Expected post op anemia: w/o clinical significance/stable. May be diluted due to volume overload.   IV Insulin protocol. No hx: DM-continue IV Insulin protocol-transition when appropriate  Hx: Morbid obesity w/BMI >40-plan for RD to see when appropriate  Nutrition: Has been n.p.o. since surgery, we will either start TPN/trickle TF if unable to be extubated today.  Once extubated, will need swallow eval.  Patient has narrow airway, issues with OGT placement, will likely not be able to pass Dobbhoff.  Continues on Reglan/Senna/Colace.  Hx: ETOH abuse-monitor for withdrawals. CIWA protocol PRN     Discharge planning: pt lives alone and has supportive family. Continue to monitor as pt progresses.     Plan of care discussed w/RN and CV Surgeon: Dr. Marysol Randle, APRN  11/27/2024  5281    Addendum:  Attempted SBT - pt very tachypneic, extremely HTN - required IV nitroglycerin 150. Copius tenacious secretions noted.  Unable to extubate today, re sedate, wean off nitroglycerin.  Continue supportive care, diuresis, Abx for pneumonia.  Will start TF via OGT - pt has  narrow airway/difficult to pass OG - done by critical care Fri with scope.  Sister updated at bedside.         [1]   Allergies  Allergen Reactions    Atorvastatin MYALGIA    Pravastatin MYALGIA    Rosuvastatin MYALGIA    Seasonal Runny nose

## 2024-11-27 NOTE — CM/SW NOTE
Still on vent support.  Alisha is not ready for progression of dc planning.  CM will continue to follow and assist with dc planning needs.    Zohra GONZALEZA BSN RN CRRN   RN Case Manager  [phone]

## 2024-11-27 NOTE — PROGRESS NOTES
[facility]  part of [facility]    Progress Note    Alisha Wilde Patient Status:  Inpatient    10/25/1965 MRN [id number]   Location [facility] 2W/SW Attending Hayde Brito MD   Hosp Day # 5 PCP Bridger Avendano MD     Chief Complaint:   Chief Complaint   Patient presents with    Chest Pain Angina       Subjective:   Alisha Wilde is intubated on sedation but opens her eyes.   Fio2 40%. Failed SBT today.     Objective:   Objective:    Blood pressure 130/65, pulse 77, temperature 98.8 °F (37.1 °C), temperature source Axillary, resp. rate 25, height 5' 5\" (1.651 m), weight 284 lb 9.8 oz (129.1 kg), SpO2 97%, not currently breastfeeding.    Physical Exam:    General: No acute distress. Intubated.   Respiratory: Diminished bases   Cardiovascular: S1, S2. Regular rate and rhythm. No murmurs, rubs or gallops.   Abdomen: Soft, nontender, nondistended.  Positive bowel sounds. No rebound or guarding.  Neurologic: No focal neurological deficits.   Musculoskeletal: Moves all extremities.  Extremities: No edema.      Results:   Results:    Labs:  Recent Labs   Lab 24  1720 24  0133 24  0251 24  1015 24  0418 24  0430 24  0509 24  0546 24  0847   WBC 6.4   < >  --    < > 13.4* 9.3 9.4 9.9 9.5   HGB 12.2   < >  --    < > 9.6* 8.5* 8.3* 8.5* 8.5*   MCV 83.6   < >  --    < > 84.0 83.1 84.6 83.6 84.7   .0   < >  --    < > 127.0* 106.0* 102.0* 133.0* 166.0   INR 0.99  --  1.25*  --   --   --   --   --   --     < > = values in this interval not displayed.       Recent Labs   Lab 24  1720 24  0251 24  0418 24  0430 24  0509 24  1746 24  0546 24  1821 24  0406 24  0847   *   < > 142* 132* 137*   < > 124* 131*  --  131*   BUN 21   < > 32* 32* 29*   < > 25* 24*  --  23   CREATSERUM 1.19*   < > 2.28* 1.38* 1.13*   < > 1.12* 1.10*  --  1.09*   CA 9.6   < > 8.4* 8.3* 8.4*   < > 8.6*  8.4*  --  8.9   ALB 4.3  --  3.4 3.4 3.5  --  3.5  --   --   --    *   < > 144 144 143   < > 144 142  --  144   K 3.4*   < > 4.2 3.3* 3.3*   < > 3.4* 3.4* 3.9 4.2      < > 112 111 111   < > 112 111  --  113*   CO2 28.0   < > 21.0 23.0 23.0   < > 22.0 22.0  --  22.0   ALKPHO 90  --  49*  --  42*  --   --   --   --   --    AST 27  --  33  --  20  --   --   --   --   --    ALT 22  --  9*  --  <7*  --   --   --   --   --    BILT 0.4  --  0.3  --  0.4  --   --   --   --   --    TP 6.9  --  5.3*  --  5.4*  --   --   --   --   --     < > = values in this interval not displayed.       Estimated Creatinine Clearance: 48.8 mL/min (A) (based on SCr of 1.09 mg/dL (H)).    Recent Labs   Lab 11/21/24  1720 11/22/24  0251   PTP 13.7 16.4*   INR 0.99 1.25*            Culture:  No results found for this visit on 11/21/24.    Cardiac  Recent Labs   Lab 11/21/24  1720   PBNP 134*         Imaging: Imaging data reviewed in [facility].  XR CHEST AP PORTABLE  (CPT=71045)    Result Date: 11/27/2024  CONCLUSION:   Moderate pulmonary edema, progressed since 11/26/2024.  Lines and tubes in place as described.   Dictated by (CST): Terrence Colorado MD on 11/27/2024 at 7:56 AM     Finalized by (CST): Terrence Colorado MD on 11/27/2024 at 8:00 AM          XR CHEST AP PORTABLE  (CPT=71045)    Result Date: 11/26/2024  CONCLUSION:  1. Cardiomegaly aneurysmal thoracic aorta.  Sternotomy changes 2. ET tube 1.8 cm above the jennyfer .  Recommend 1-2 cm pullback for optimal positioning 3. Remaining support tubes and catheters are satisfactory. 4. Left perihilar bibasilar postoperative parenchymal abnormality consistent with atelectasis and/or developing consolidation.  Slight progression.  Small bilateral effusions.    Dictated by (CST): Woodrow Fischer MD on 11/26/2024 at 8:32 AM     Finalized by (CST): Woodrow Fischer MD on 11/26/2024 at 8:42 AM           Medications:    furosemide  40 mg Intravenous TID    potassium chloride  20 mEq Oral BID     [START ON 11/28/2024] amLODIPine  10 mg Oral Daily    losartan  50 mg Oral Daily    sodium chloride  3 mL Nebulization 4 times per day    metoclopramide  10 mg Intravenous Q6H    albuterol  2.5 mg Nebulization 4 times per day    heparin  5,000 Units Subcutaneous Q8H GABRIEL    metoprolol tartrate  25 mg Oral 2x Daily(Beta Blocker)    chlorhexidine gluconate  15 mL Mouth/Throat BID    piperacillin-tazobactam  3.375 g Intravenous Q8H    famotidine  20 mg Oral Daily    Or    famotidine  20 mg Intravenous Daily    aspirin  81 mg Oral Daily         Assessment and Plan:   Assessment & Plan:        Type A aortic dissection   S/p emergent repair 11/22/24   On NTG gtt as per CV surgery   Chest tube per CV surgery   CV surgery, cards and critical care on consult.   TTE  Cont losartan and amlodipine and metoprolol   Acute hypoxic respiratory failure   Aspiration event   IV zosyn.   SBT daily.   Pulmonary on consult.   CXR pulm edema   Lasix IV TID  H/o tobacco and ETOH abuse   Duonebs PRN   NAIMA on CKD III   Renal on consult.   [place] to be secondary to MARY LOU/ATN   BUN/Creat improving.   Other medical problems  Morbid Obesity   TANYA     >55min spent, >50% spent counseling and coordinating care in the form of educating pt/family and d/w consultants and staff. Most of the time spent discussing the above plan.        Plan of care discussed with patient or family at bedside.    Hayde Brito MD  Hospitalist          Supplementary Documentation:     Quality:  DVT Prophylaxis: heparin   CODE status: Full  Dispo: per clinical course            Estimated date of discharge: TBD  Discharge is dependent on: clinical stability  At this point Ms. Wilde is expected to be discharge to: TBD

## 2024-11-27 NOTE — PROGRESS NOTES
Received pt on full vent support. Pt later transported to & from CT without incident. Pt later placed on SBT 5/5, 40%. Pt tolerated fairly well for 30 minutes and displayed the followin24 1514 24 1545   Spontaneous Parameters   Airway Cuff Leak Test Volume Observed  --  150  (return Vt of ~400 from set Vt 550)   Spontaneous RR Rate 17 22   Spontaneous Minute Volume 8.5 13   Average Spontaneous Tidal Volume 501 586   $ Spontaneous Vital Capacity 0.812  --    Negative Inspiratory Force -23  --    Total RSBI 38 35     Cuff test performed per pulm. Audible cuff leak heard without auscultation with approx 150mls of leak observed. Suction provided before & immediately after cuff leak test. Pt placed back on full support after 30 minutes d/t hypertension and large amount of oral & ETT secretions, Dr Herman notified. ETT was retracted 2cm per CXR/Dr Herman, sx provided before & after ETT was repositioned. ETT remains secured at 25cm at the lip. Persistent wheezing heard on auscultation throughout shift, okay to switch mucomyst for NaCl 3% per Dr Herman. RT to continue to monitor.

## 2024-11-28 ENCOUNTER — APPOINTMENT (OUTPATIENT)
Dept: GENERAL RADIOLOGY | Facility: HOSPITAL | Age: 59
DRG: 003 | End: 2024-11-28
Attending: HOSPITALIST
Payer: COMMERCIAL

## 2024-11-28 ENCOUNTER — APPOINTMENT (OUTPATIENT)
Dept: GENERAL RADIOLOGY | Facility: HOSPITAL | Age: 59
End: 2024-11-28
Attending: HOSPITALIST
Payer: COMMERCIAL

## 2024-11-28 LAB
ALBUMIN SERPL-MCNC: 3.6 G/DL (ref 3.2–4.8)
ANION GAP SERPL CALC-SCNC: 6 MMOL/L (ref 0–18)
ANION GAP SERPL CALC-SCNC: 9 MMOL/L (ref 0–18)
ANTIBODY SCREEN: NEGATIVE
BUN BLD-MCNC: 26 MG/DL (ref 9–23)
BUN BLD-MCNC: 26 MG/DL (ref 9–23)
BUN BLD-MCNC: 28 MG/DL (ref 9–23)
BUN BLD-MCNC: 28 MG/DL (ref 9–23)
BUN/CREAT SERPL: 17.9 (ref 10–20)
BUN/CREAT SERPL: 17.9 (ref 10–20)
BUN/CREAT SERPL: 18.9 (ref 10–20)
BUN/CREAT SERPL: 20.3 (ref 10–20)
CALCIUM BLD-MCNC: 8.9 MG/DL (ref 8.7–10.4)
CALCIUM BLD-MCNC: 9 MG/DL (ref 8.7–10.4)
CHLORIDE SERPL-SCNC: 110 MMOL/L (ref 98–112)
CHLORIDE SERPL-SCNC: 111 MMOL/L (ref 98–112)
CHLORIDE SERPL-SCNC: 111 MMOL/L (ref 98–112)
CHLORIDE SERPL-SCNC: 112 MMOL/L (ref 98–112)
CO2 SERPL-SCNC: 22 MMOL/L (ref 21–32)
CO2 SERPL-SCNC: 23 MMOL/L (ref 21–32)
CO2 SERPL-SCNC: 23 MMOL/L (ref 21–32)
CO2 SERPL-SCNC: 24 MMOL/L (ref 21–32)
CREAT BLD-MCNC: 1.38 MG/DL
CREAT BLD-MCNC: 1.45 MG/DL
CREAT BLD-MCNC: 1.45 MG/DL
CREAT BLD-MCNC: 1.48 MG/DL
DEPRECATED RDW RBC AUTO: 48.3 FL (ref 35.1–46.3)
DEPRECATED RDW RBC AUTO: 48.3 FL (ref 35.1–46.3)
EGFRCR SERPLBLD CKD-EPI 2021: 40 ML/MIN/1.73M2 (ref 60–?)
EGFRCR SERPLBLD CKD-EPI 2021: 41 ML/MIN/1.73M2 (ref 60–?)
EGFRCR SERPLBLD CKD-EPI 2021: 41 ML/MIN/1.73M2 (ref 60–?)
EGFRCR SERPLBLD CKD-EPI 2021: 44 ML/MIN/1.73M2 (ref 60–?)
ERYTHROCYTE [DISTWIDTH] IN BLOOD BY AUTOMATED COUNT: 15.8 % (ref 11–15)
ERYTHROCYTE [DISTWIDTH] IN BLOOD BY AUTOMATED COUNT: 16 % (ref 11–15)
GLUCOSE BLD-MCNC: 119 MG/DL (ref 70–99)
GLUCOSE BLD-MCNC: 131 MG/DL (ref 70–99)
GLUCOSE BLD-MCNC: 131 MG/DL (ref 70–99)
GLUCOSE BLD-MCNC: 138 MG/DL (ref 70–99)
GLUCOSE BLDC GLUCOMTR-MCNC: 119 MG/DL (ref 70–99)
GLUCOSE BLDC GLUCOMTR-MCNC: 123 MG/DL (ref 70–99)
GLUCOSE BLDC GLUCOMTR-MCNC: 124 MG/DL (ref 70–99)
GLUCOSE BLDC GLUCOMTR-MCNC: 127 MG/DL (ref 70–99)
GLUCOSE BLDC GLUCOMTR-MCNC: 129 MG/DL (ref 70–99)
GLUCOSE BLDC GLUCOMTR-MCNC: 130 MG/DL (ref 70–99)
GLUCOSE BLDC GLUCOMTR-MCNC: 134 MG/DL (ref 70–99)
GLUCOSE BLDC GLUCOMTR-MCNC: 134 MG/DL (ref 70–99)
GLUCOSE BLDC GLUCOMTR-MCNC: 135 MG/DL (ref 70–99)
GLUCOSE BLDC GLUCOMTR-MCNC: 142 MG/DL (ref 70–99)
GLUCOSE BLDC GLUCOMTR-MCNC: 145 MG/DL (ref 70–99)
GLUCOSE BLDC GLUCOMTR-MCNC: 147 MG/DL (ref 70–99)
HCT VFR BLD AUTO: 23.5 %
HCT VFR BLD AUTO: 25.8 %
HGB BLD-MCNC: 7.8 G/DL
HGB BLD-MCNC: 8.6 G/DL
MAGNESIUM SERPL-MCNC: 2.1 MG/DL (ref 1.6–2.6)
MCH RBC QN AUTO: 27.3 PG (ref 26–34)
MCH RBC QN AUTO: 27.8 PG (ref 26–34)
MCHC RBC AUTO-ENTMCNC: 33.2 G/DL (ref 31–37)
MCHC RBC AUTO-ENTMCNC: 33.3 G/DL (ref 31–37)
MCV RBC AUTO: 81.9 FL
MCV RBC AUTO: 83.6 FL
OSMOLALITY SERPL CALC.SUM OF ELEC: 299 MOSM/KG (ref 275–295)
OSMOLALITY SERPL CALC.SUM OF ELEC: 302 MOSM/KG (ref 275–295)
OSMOLALITY SERPL CALC.SUM OF ELEC: 303 MOSM/KG (ref 275–295)
OSMOLALITY SERPL CALC.SUM OF ELEC: 303 MOSM/KG (ref 275–295)
PHOSPHATE SERPL-MCNC: 3.6 MG/DL (ref 2.4–5.1)
PLATELET # BLD AUTO: 191 10(3)UL (ref 150–450)
PLATELET # BLD AUTO: 194 10(3)UL (ref 150–450)
POTASSIUM SERPL-SCNC: 4 MMOL/L (ref 3.5–5.1)
POTASSIUM SERPL-SCNC: 4 MMOL/L (ref 3.5–5.1)
POTASSIUM SERPL-SCNC: 4.1 MMOL/L (ref 3.5–5.1)
POTASSIUM SERPL-SCNC: 4.2 MMOL/L (ref 3.5–5.1)
RBC # BLD AUTO: 2.81 X10(6)UL
RBC # BLD AUTO: 3.15 X10(6)UL
RH BLOOD TYPE: POSITIVE
SODIUM SERPL-SCNC: 141 MMOL/L (ref 136–145)
SODIUM SERPL-SCNC: 142 MMOL/L (ref 136–145)
SODIUM SERPL-SCNC: 143 MMOL/L (ref 136–145)
SODIUM SERPL-SCNC: 143 MMOL/L (ref 136–145)
WBC # BLD AUTO: 9.4 X10(3) UL (ref 4–11)
WBC # BLD AUTO: 9.8 X10(3) UL (ref 4–11)

## 2024-11-28 PROCEDURE — 99233 SBSQ HOSP IP/OBS HIGH 50: CPT | Performed by: HOSPITALIST

## 2024-11-28 PROCEDURE — 99291 CRITICAL CARE FIRST HOUR: CPT | Performed by: INTERNAL MEDICINE

## 2024-11-28 PROCEDURE — 71045 X-RAY EXAM CHEST 1 VIEW: CPT | Performed by: HOSPITALIST

## 2024-11-28 PROCEDURE — 99233 SBSQ HOSP IP/OBS HIGH 50: CPT | Performed by: INTERNAL MEDICINE

## 2024-11-28 RX ORDER — SODIUM CHLORIDE 9 MG/ML
INJECTION, SOLUTION INTRAVENOUS ONCE
Status: COMPLETED | OUTPATIENT
Start: 2024-11-28 | End: 2024-11-28

## 2024-11-28 RX ORDER — HYDRALAZINE HYDROCHLORIDE 25 MG/1
25 TABLET, FILM COATED ORAL EVERY 8 HOURS SCHEDULED
Status: DISCONTINUED | OUTPATIENT
Start: 2024-11-28 | End: 2024-11-29

## 2024-11-28 RX ORDER — ISOSORBIDE DINITRATE 20 MG/1
20 TABLET ORAL
Status: DISCONTINUED | OUTPATIENT
Start: 2024-11-28 | End: 2024-11-29

## 2024-11-28 NOTE — PROGRESS NOTES
11/28/24 0230   Vent Information   Vent Mode VC/AC   Settings   FiO2 (%) 40 %   Resp Rate (Set) 18   Vt (Set, mL) 550 mL   PEEP/CPAP (cm H2O) 5 cm H20   ETT   Placement Date/Time: 11/22/24 1430   Airway Size: 7.5 mm  Cuffed: Cuffed  Insertion attempts: 2  Technique: Rapid sequence;Video laryngoscopy;Flexible bronchoscopy;Stylet  Placement Verification: Bronchoscopy;Capnometry;Colorimetric EtCO2 device  Plac...   Secured at (cm) 25 cm     Pt. vitals and SpO2 are stable, no acute changes. B.S ausculted. Suction is provided. RT will continue to monitor.

## 2024-11-28 NOTE — PLAN OF CARE
Freq suctioning required during shift R/T thick/tenacious secretions. BP will increase to the 160's-180's with stimulation/suctioning. Pt will follow commands at times, even with current sedation in place, but will also appear quite anxious when woken up/stimulated with RR to the 40's, increased BP, decrease in O2 sats. Turning throughout the shift with the Auto-turn function on the bed. Restraints remain in place for tube/line safety.    Nitroglycerin for BP control, currently @ 30mcg.   Insulin gtt 0.5units, Algo 1  Precedex 1mcg  Propofol 45mcg      Problem: Diabetes/Glucose Control  Goal: Glucose maintained within prescribed range  Description: INTERVENTIONS:  - Monitor Blood Glucose as ordered  - Assess for signs and symptoms of hyperglycemia and hypoglycemia  - Administer ordered medications to maintain glucose within target range  - Assess barriers to adequate nutritional intake and initiate nutrition consult as needed  - Instruct patient on self management of diabetes  Outcome: Progressing     Problem: CARDIOVASCULAR - ADULT  Goal: Maintains optimal cardiac output and hemodynamic stability  Description: INTERVENTIONS:  - Monitor vital signs, rhythm, and trends  - Monitor for bleeding, hypotension and signs of decreased cardiac output  - Evaluate effectiveness of vasoactive medications to optimize hemodynamic stability  - Monitor arterial and/or venous puncture sites for bleeding and/or hematoma  - Assess quality of pulses, skin color and temperature  - Assess for signs of decreased coronary artery perfusion - ex. Angina  - Evaluate fluid balance, assess for edema, trend weights  Outcome: Progressing  Goal: Absence of cardiac arrhythmias or at baseline  Description: INTERVENTIONS:  - Continuous cardiac monitoring, monitor vital signs, obtain 12 lead EKG if indicated  - Evaluate effectiveness of antiarrhythmic and heart rate control medications as ordered  - Initiate emergency measures for life threatening  arrhythmias  - Monitor electrolytes and administer replacement therapy as ordered  Outcome: Progressing     Problem: GASTROINTESTINAL - ADULT  Goal: Maintains or returns to baseline bowel function  Description: INTERVENTIONS:  - Assess bowel function  - Maintain adequate hydration with IV or PO as ordered and tolerated  - Evaluate effectiveness of GI medications  - Encourage mobilization and activity  - Obtain nutritional consult as needed  - Establish a toileting routine/schedule  - Consider collaborating with pharmacy to review patient's medication profile  Outcome: Progressing     Problem: METABOLIC/FLUID AND ELECTROLYTES - ADULT  Goal: Glucose maintained within prescribed range  Description: INTERVENTIONS:  - Monitor Blood Glucose as ordered  - Assess for signs and symptoms of hyperglycemia and hypoglycemia  - Administer ordered medications to maintain glucose within target range  - Assess barriers to adequate nutritional intake and initiate nutrition consult as needed  - Instruct patient on self management of diabetes  Outcome: Progressing  Goal: Electrolytes maintained within normal limits  Description: INTERVENTIONS:  - Monitor labs and rhythm and assess patient for signs and symptoms of electrolyte imbalances  - Administer electrolyte replacement as ordered  - Monitor response to electrolyte replacements, including rhythm and repeat lab results as appropriate  - Fluid restriction as ordered  - Instruct patient on fluid and nutrition restrictions as appropriate  Outcome: Progressing  Goal: Hemodynamic stability and optimal renal function maintained  Description: INTERVENTIONS:  - Monitor labs and assess for signs and symptoms of volume excess or deficit  - Monitor intake, output and patient weight  - Monitor urine specific gravity, serum osmolarity and serum sodium as indicated or ordered  - Monitor response to interventions for patient's volume status, including labs, urine output, blood pressure (other  measures as available)  - Encourage oral intake as appropriate  - Instruct patient on fluid and nutrition restrictions as appropriate  Outcome: Progressing     Problem: SKIN/TISSUE INTEGRITY - ADULT  Goal: Skin integrity remains intact  Description: INTERVENTIONS  - Assess and document risk factors for pressure ulcer development  - Assess and document skin integrity  - Monitor for areas of redness and/or skin breakdown  - Initiate interventions, skin care algorithm/standards of care as needed  Outcome: Progressing  Goal: Incision(s), wounds(s) or drain site(s) healing without S/S of infection  Description: INTERVENTIONS:  - Assess and document risk factors for pressure ulcer development  - Assess and document skin integrity  - Assess and document dressing/incision, wound bed, drain sites and surrounding tissue  - Implement wound care per orders  - Initiate isolation precautions as appropriate  - Initiate Pressure Ulcer prevention bundle as indicated  Outcome: Progressing  Goal: Oral mucous membranes remain intact  Description: INTERVENTIONS  - Assess oral mucosa and hygiene practices  - Implement preventative oral hygiene regimen  - Implement oral medicated treatments as ordered  Outcome: Progressing     Problem: HEMATOLOGIC - ADULT  Goal: Maintains hematologic stability  Description: INTERVENTIONS  - Assess for signs and symptoms of bleeding or hemorrhage  - Monitor labs and vital signs for trends  - Administer supportive blood products/factors, fluids and medications as ordered and appropriate  - Administer supportive blood products/factors as ordered and appropriate  Outcome: Progressing  Goal: Free from bleeding injury  Description: (Example usage: patient with low platelets)  INTERVENTIONS:  - Avoid intramuscular injections, enemas and rectal medication administration  - Ensure safe mobilization of patient  - Hold pressure on venipuncture sites to achieve adequate hemostasis  - Assess for signs and symptoms of  internal bleeding  - Monitor lab trends  - Patient is to report abnormal signs of bleeding to staff  - Avoid use of toothpicks and dental floss  - Use electric shaver for shaving  - Use soft bristle tooth brush  - Limit straining and forceful nose blowing  Outcome: Progressing     Problem: MUSCULOSKELETAL - ADULT  Goal: Return mobility to safest level of function  Description: INTERVENTIONS:  - Assess patient stability and activity tolerance for standing, transferring and ambulating w/ or w/o assistive devices  - Assist with transfers and ambulation using safe patient handling equipment as needed  - Ensure adequate protection for wounds/incisions during mobilization  - Obtain PT/OT consults as needed  - Advance activity as appropriate  - Communicate ordered activity level and limitations with patient/family  Outcome: Progressing  Goal: Maintain proper alignment of affected body part  Description: INTERVENTIONS:  - Support and protect limb and body alignment per provider's orders  - Instruct and reinforce with patient and family use of appropriate assistive device and precautions (e.g. spinal or hip dislocation precautions)  Outcome: Progressing     Problem: RESPIRATORY - ADULT  Goal: Achieves optimal ventilation and oxygenation  Description: INTERVENTIONS:  - Assess for changes in respiratory status  - Assess for changes in mentation and behavior  - Position to facilitate oxygenation and minimize respiratory effort  - Oxygen supplementation based on oxygen saturation or ABGs  - Provide Smoking Cessation handout, if applicable  - Encourage broncho-pulmonary hygiene including cough, deep breathe, Incentive Spirometry  - Assess the need for suctioning and perform as needed  - Assess and instruct to report SOB or any respiratory difficulty  - Respiratory Therapy support as indicated  - Manage/alleviate anxiety  - Monitor for signs/symptoms of CO2 retention  Outcome: Not Progressing     Problem: Safety Risk - Non-Violent  Restraints  Goal: Patient will remain free from self-harm  Description: INTERVENTIONS:  - Apply the least restrictive restraint to prevent harm  - Notify patient and family of reasons restraints applied  - Assess for any contributing factors to confusion (electrolyte disturbances, delirium, medications)  - Discontinue any unnecessary medical devices as soon as possible  - Assess the patient's physical comfort, circulation, skin condition, hydration, nutrition and elimination needs   - Reorient and redirection as needed  - Assess for the need to continue restraints  Outcome: Not Progressing

## 2024-11-28 NOTE — DISCHARGE INSTRUCTIONS
CARDIAC SURGERY DISCHARGE INSTRUCTIONS  Shower daily and clean your surgical incisions with soap and water then swab with Betadine 2 x day until your follow up office visit.  Do not drive for one month  Do not lift/push/pull greater than 10 lbs for 3 months   Do not soak (submerge) your incision in water such as tub/pool/etc for one month      Referrals for substance abuse treatment are listed below. All facilities are located within 10 miles from you and are in network with your insurance. Please call to schedule an intake appointment.      [facility]  [address]  [address]  [place] [address]  [phone]    [facility]  [address] [place] [address]  [phone]    [facility]  [address] [place] [address] [phone]    [facility]  [address] [place], [place] [address] [phone]    [facility]  [address] [place] [address]  [phone]       [facility]  [address]  [address]  [place] [address]  [phone]

## 2024-11-28 NOTE — RESPIRATORY THERAPY NOTE
Received pt on full vent support:   11/27/24 0749   Vent Information   Vent Mode VC/AC   Settings   FiO2 (%) 40 %   Resp Rate (Set) 18   Vt (Set, mL) 550 mL   Waveform Decelerating ramp   PEEP/CPAP (cm H2O) 5 cm H20     SBT attempted on 5/5, 40%. Pt unable to tolerate d/t increased WOB, tachypnea, desat, and large amount of secretions. Dr Rainey at bedside during end of SBT. Pt placed back on full support after 30 mins. Nebs given as scheduled. ETT remains secured at 25cm at the lip. RT to continue to monitor.

## 2024-11-28 NOTE — PROGRESS NOTES
Patient Education    BRIGHT FUTURES HANDOUT- PARENT  6 MONTH VISIT  Here are some suggestions from [employer] experts that may be of value to your family.     HOW YOUR FAMILY IS DOING  If you are worried about your living or food situation, talk with us. Community agencies and programs such as WIC and SNAP can also provide information and assistance.  Don t smoke or use e-cigarettes. Keep your home and car smoke-free. Tobacco-free spaces keep children healthy.  Don t use alcohol or drugs.  Choose a mature, trained, and responsible  or caregiver.  Ask us questions about  programs.  Talk with us or call for help if you feel sad or very tired for more than a few days.  Spend time with family and friends.    YOUR BABY S DEVELOPMENT   Place your baby so she is sitting up and can look around.  Talk with your baby by copying the sounds she makes.  Look at and read books together.  Play games such as [employer], lou-cake, and so big.  Don t have a TV on in the background or use a TV or other digital media to calm your baby.  If your baby is fussy, give her safe toys to hold and put into her mouth. Make sure she is getting regular naps and playtimes.    FEEDING YOUR BABY   Know that your baby s growth will slow down.  Be proud of yourself if you are still breastfeeding. Continue as long as you and your baby want.  Use an iron-fortified formula if you are formula feeding.  Begin to feed your baby solid food when he is ready.  Look for signs your baby is ready for solids. He will  Open his mouth for the spoon.  Sit with support.  Show good head and neck control.  Be interested in foods you eat.  Starting New Foods  Introduce one new food at a time.  Use foods with good sources of iron and zinc, such as  Iron- and zinc-fortified cereal  Pureed red meat, such as beef or lamb  Introduce fruits and vegetables after your baby eats iron- and zinc-fortified cereal or pureed meat well.  Offer solid food 2 to  CV Surgery    Patient seen and examined.    Though it still with tenacious, thick secretions, her tongue is still quite large.    Recent Labs   Lab 11/21/24  1720 11/22/24  0133 11/23/24  0418 11/24/24  0430 11/25/24  0509 11/26/24  0546 11/27/24  0847 11/28/24  0409   RBC 4.58   < > 3.56*   < > 3.12* 3.17* 3.13* 2.81*   HGB 12.2   < > 9.6*   < > 8.3* 8.5* 8.5* 7.8*   HCT 38.3   < > 29.9*   < > 26.4* 26.5* 26.5* 23.5*   MCV 83.6   < > 84.0   < > 84.6 83.6 84.7 83.6   MCH 26.6   < > 27.0   < > 26.6 26.8 27.2 27.8   MCHC 31.9   < > 32.1   < > 31.4 32.1 32.1 33.2   RDW 15.9*   < > 16.4*   < > 16.1* 16.2* 16.3* 15.8*   NEPRELIM 3.70  --  11.86*  --  7.00  --   --   --    WBC 6.4   < > 13.4*   < > 9.4 9.9 9.5 9.8   .0   < > 127.0*   < > 102.0* 133.0* 166.0 191.0    < > = values in this interval not displayed.         Plan:  Continue vent and antibiotic  Will transfuse 1 unit of PRBC today for hemoglobin 7.8.  She is on Lasix with robust urine output, creatinine 1.47, will monitor tomorrow with repeat labs, urine output remains robust.  Will see how she does over the long weekend regarding her ventilatory standpoint, pneumonia and secretions.  If no significant improvement, she will likely benefit from tracheostomy and PEG tube placement.  She is on nitroglycerin infusion, adjust meds per cardiology for hypertension.    Rafa Gregg MD  Cardiothoracic Surgery  [facility]    3 times per day; let him decide how much to eat.  Avoid raw honey or large chunks of food that could cause choking.  Consider introducing all other foods, including eggs and peanut butter, because research shows they may actually prevent individual food allergies.  To prevent choking, give your baby only very soft, small bites of finger foods.  Wash fruits and vegetables before serving.  Introduce your baby to a cup with water, breast milk, or formula.  Avoid feeding your baby too much; follow baby s signs of fullness, such as  Leaning back  Turning away  Don t force your baby to eat or finish foods.  It may take 10 to 15 times of offering your baby a type of food to try before he likes it.    HEALTHY TEETH  Ask us about the need for fluoride.  Clean gums and teeth (as soon as you see the first tooth) 2 times per day with a soft cloth or soft toothbrush and a small smear of fluoride toothpaste (no more than a grain of rice).  Don t give your baby a bottle in the crib. Never prop the bottle.  Don t use foods or juices that your baby sucks out of a pouch.  Don t share spoons or clean the pacifier in your mouth.    SAFETY    Use a rear-facing-only car safety seat in the back seat of all vehicles.    Never put your baby in the front seat of a vehicle that has a passenger airbag.    If your baby has reached the maximum height/weight allowed with your rear-facing-only car seat, you can use an approved convertible or 3-in-1 seat in the rear-facing position.    Put your baby to sleep on her back.    Choose crib with slats no more than 2 3/8 inches apart.    Lower the crib mattress all the way.    Don t use a drop-side crib.    Don t put soft objects and loose bedding such as blankets, pillows, bumper pads, and toys in the crib.    If you choose to use a mesh playpen, get one made after February 28, 2013.    Do a home safety check (stair gutierrez, barriers around space heaters, and covered electrical outlets).    Don t leave  your baby alone in the tub, near water, or in high places such as changing tables, beds, and sofas.    Keep poisons, medicines, and cleaning supplies locked and out of your baby s sight and reach.    Put the Poison Help line number into all phones, including cell phones. Call us if you are worried your baby has swallowed something harmful.    Keep your baby in a high chair or playpen while you are in the kitchen.    Do not use a baby walker.    Keep small objects, cords, and latex balloons away from your baby.    Keep your baby out of the sun. When you do go out, put a hat on your baby and apply sunscreen with SPF of 15 or higher on her exposed skin.    WHAT TO EXPECT AT YOUR BABY S 9 MONTH VISIT  We will talk about    Caring for your baby, your family, and yourself    Teaching and playing with your baby    Disciplining your baby    Introducing new foods and establishing a routine    Keeping your baby safe at home and in the car        Helpful Resources: Smoking Quit Line: 736.551.1286  Poison Help Line:  359.683.2273  Information About Car Safety Seats: www.safercar.gov/parents  Toll-free Auto Safety Hotline: 191.219.9051  Consistent with Bright Futures: Guidelines for Health Supervision of Infants, Children, and Adolescents, 4th Edition  For more information, go to https://brightfutures.aap.org.

## 2024-11-28 NOTE — PLAN OF CARE
Patient remains on full vent support. Mode was switched to PRVC d/t increased PIPs high 30s intermittently 40s. Large amount of pink tinged secretions suctioned throughout the shift. BS remain coarse bilaterally. Q4 alb/3% given.  Problem: RESPIRATORY - ADULT  Goal: Achieves optimal ventilation and oxygenation  Description: INTERVENTIONS:  - Assess for changes in respiratory status  - Assess for changes in mentation and behavior  - Position to facilitate oxygenation and minimize respiratory effort  - Oxygen supplementation based on oxygen saturation or ABGs  - Provide Smoking Cessation handout, if applicable  - Encourage broncho-pulmonary hygiene including cough, deep breathe, Incentive Spirometry  - Assess the need for suctioning and perform as needed  - Assess and instruct to report SOB or any respiratory difficulty  - Respiratory Therapy support as indicated  - Manage/alleviate anxiety  - Monitor for signs/symptoms of CO2 retention  Outcome: Progressing

## 2024-11-28 NOTE — PROGRESS NOTES
Cardiology Progress Note    Alisha Wilde Patient Status:  Inpatient    10/25/1965 MRN [id number]   Location [facility] 2W/SW Attending Hayde Brito MD   Hosp Day # 6 PCP Bridger Avendano MD     Interval Note:  Patient seen and examined  Intubated, sedated  Blood pressure goal      --------------------------------------------------------------------------------------------------------------------------------  ROS 12 systems reviewed, pertinent findings above.  ROS    History:  Past Medical History:    Chronic kidney disease (CKD)    Esophageal reflux    High blood pressure    High cholesterol    HYPERTENSION    Hypertension    Unspecified essential hypertension     Past Surgical History:   Procedure Laterality Date    Colonoscopy N/A 2018    Procedure: COLONOSCOPY;  Surgeon: Magdy Webber MD;  Location: [facility] ENDOSCOPY    Endometrial ablation      child passed away for 5 mins          1    Other surgical history  11    cysto-Dr. Lacey -- pt denies     Family History   Problem Relation Age of Onset    Hypertension Mother     Heart Disorder Mother 70    Other (Other) Mother         kidney failure    Hypertension Father     Hypertension Maternal Grandfather     Cancer Neg     Diabetes Neg     Glaucoma Neg     Macular degeneration Neg       reports that she quit smoking about 25 years ago. Her smoking use included cigarettes. She started smoking about 38 years ago. She has a 13 pack-year smoking history. She has quit using smokeless tobacco. She reports current alcohol use of about 1.0 - 2.0 standard drink of alcohol per week. She reports that she does not use drugs.    Objective:   Temp: 98.1 °F (36.7 °C)  Pulse: 69  Resp: 19  BP: 122/66  AO: 137/53  FiO2 (%): 40 %    Intake/Output:     Intake/Output Summary (Last 24 hours) at 2024 0910  Last data filed at 2024 0600  Gross per 24 hour   Intake 2976.9 ml   Output 2860 ml   Net 116.9 ml       Physical Exam:    General:  Intubated, sedated  HEENT: Normocephalic, anicteric sclera, neck supple.  Neck: No JVD, carotids 2+, no bruits.  Cardiac: Regular rate and rhythm. S1, S2 normal. No murmur, pericardial rub, S3.  Lungs: Clear without wheezes, rales, rhonchi or dullness.  Normal excursions and effort.  Abdomen: Soft, non-tender. BS-present.  Extremities: Without clubbing, cyanosis or edema.  Peripheral pulses are 2+.  Neurologic: Non-focal  Skin: Warm and dry.       Assessment   Ventilator dependent respiratory failure, aspiration requiring urgent reintubation  Extensive Type A dissection status post surgical repair  NAIMA  Hypertension  Alcohol abuse history  CKD      Plan  Discussed with RN, patient remains fairly stable  Plan for tracheostomy Monday  Blood pressure controlled on amlodipine, losartan, beta-blocker  Continue diuretics as needed, close monitoring of renal function    Thank you for allowing me to take part in the care of Alisha Wilde. Please call with any questions of concerns.      Level of care: L3    Don Beth DO  [facility]   Interventional Cardiac and Vascular Services      Don Beth DO  November 28, 2024  9:10 AM

## 2024-11-28 NOTE — PROGRESS NOTES
Referrals for substance abuse treatment are listed below. All facilities are located within 10 miles from you and are in network with your insurance. Please call to schedule an intake appointment.      [facility]  [address]  [address]  [place] [address]  [phone]    [facility]  [address] [place] [address]  [phone]    [facility]  [address] [place], [place] [address] [phone]    [facility]  [address] [place], [place] [address] [phone]    [facility]  [address] [place] [address]  [phone]       [facility]  [address]  [address]  [place] [address]  [phone]

## 2024-11-28 NOTE — PROGRESS NOTES
[facility]  part of [facility]    Progress Note    Alisha Wilde Patient Status:  Inpatient    10/25/1965 MRN [id number]   Location [facility] 2W/SW Attending Hayde Brito MD   Hosp Day # 6 PCP Bridger Avendano MD     Chief Complaint:   Chief Complaint   Patient presents with    Chest Pain Angina       Subjective:   Alisha Wilde remains intubated. Failed SBT yesterday   Objective:   Objective:    Blood pressure 145/77, pulse 66, temperature 98.1 °F (36.7 °C), temperature source Axillary, resp. rate 18, height 5' 5\" (1.651 m), weight 284 lb 9.8 oz (129.1 kg), SpO2 99%, not currently breastfeeding.    Physical Exam:    General: No acute distress. Intubated.   Respiratory: Diminished bases   Cardiovascular: S1, S2. Regular rate and rhythm. No murmurs, rubs or gallops.   Abdomen: Soft, nontender, nondistended.  Positive bowel sounds. No rebound or guarding.  Neurologic: No focal neurological deficits.   Musculoskeletal: Moves all extremities.  Extremities: No edema.      Results:   Results:    Labs:  Recent Labs   Lab 24  1720 24  0133 24  0251 24  1015 24  0430 24  0509 24  0546 24  0847 24  0409   WBC 6.4   < >  --    < > 9.3 9.4 9.9 9.5 9.8   HGB 12.2   < >  --    < > 8.5* 8.3* 8.5* 8.5* 7.8*   MCV 83.6   < >  --    < > 83.1 84.6 83.6 84.7 83.6   .0   < >  --    < > 106.0* 102.0* 133.0* 166.0 191.0   INR 0.99  --  1.25*  --   --   --   --   --   --     < > = values in this interval not displayed.       Recent Labs   Lab 24  1720 24  0251 24  0418 24  0430 24  0509 24  1746 24  0546 24  1821 24  1820 24  0409 24  0846   *   < > 142*   < > 137*   < > 124*   < > 115* 131*  131* 138*   BUN 21   < > 32*   < > 29*   < > 25*   < > 25* 26*  26* 28*   CREATSERUM 1.19*   < > 2.28*   < > 1.13*   < > 1.12*   < > 1.35* 1.45*  1.45* 1.48*   CA 9.6   < > 8.4*   < >  8.4*   < > 8.6*   < > 8.8 8.9  8.9 9.0   ALB 4.3  --  3.4   < > 3.5  --  3.5  --   --  3.6  --    *   < > 144   < > 143   < > 144   < > 144 143  143 142   K 3.4*   < > 4.2   < > 3.3*   < > 3.4*   < > 3.8 4.0  4.0 4.2      < > 112   < > 111   < > 112   < > 112 111  111 112   CO2 28.0   < > 21.0   < > 23.0   < > 22.0   < > 22.0 23.0  23.0 24.0   ALKPHO 90  --  49*  --  42*  --   --   --   --   --   --    AST 27  --  33  --  20  --   --   --   --   --   --    ALT 22  --  9*  --  <7*  --   --   --   --   --   --    BILT 0.4  --  0.3  --  0.4  --   --   --   --   --   --    TP 6.9  --  5.3*  --  5.4*  --   --   --   --   --   --     < > = values in this interval not displayed.       Estimated Creatinine Clearance: 35.9 mL/min (A) (based on SCr of 1.48 mg/dL (H)).    Recent Labs   Lab 11/21/24  1720 11/22/24  0251   PTP 13.7 16.4*   INR 0.99 1.25*            Culture:  No results found for this visit on 11/21/24.    Cardiac  Recent Labs   Lab 11/21/24  1720   PBNP 134*         Imaging: Imaging data reviewed in Epic.  XR CHEST AP PORTABLE  (CPT=71045)    Result Date: 11/27/2024  CONCLUSION:   Moderate pulmonary edema, progressed since 11/26/2024.  Lines and tubes in place as described.   Dictated by (CST): Terrence Colorado MD on 11/27/2024 at 7:56 AM     Finalized by (CST): Terrence Colorado MD on 11/27/2024 at 8:00 AM           Medications:    isosorbide dinitrate  20 mg Per NG Tube TID (Nitrates)    [Held by provider] furosemide  40 mg Intravenous TID    potassium chloride  20 mEq Oral BID    amLODIPine  10 mg Oral Daily    [Held by provider] losartan  50 mg Oral Daily    sodium chloride  3 mL Nebulization 4 times per day    metoclopramide  10 mg Intravenous Q6H    albuterol  2.5 mg Nebulization 4 times per day    heparin  5,000 Units Subcutaneous Q8H GABRIEL    metoprolol tartrate  25 mg Oral 2x Daily(Beta Blocker)    chlorhexidine gluconate  15 mL Mouth/Throat BID    piperacillin-tazobactam  3.375 g Intravenous Q8H     famotidine  20 mg Oral Daily    Or    famotidine  20 mg Intravenous Daily    aspirin  81 mg Oral Daily         Assessment and Plan:   Assessment & Plan:        Type A aortic dissection   S/p emergent repair 11/22/24   On NTG gtt as per CV surgery   Chest tube per CV surgery   CV surgery, cards and critical care on consult.   TTE  Cont amlodipine and metoprolol   Acute hypoxic respiratory failure   Aspiration event   IV zosyn.   SBT daily.   Pulmonary on consult.   CXR pulm edema   Diuresed well with lasix. Now off.   If unable to wean off vent by Monday may need tracheostomy   H/o tobacco and ETOH abuse   Duonebs PRN   NAIMA on CKD III   Renal on consult.   [place] to be secondary to MARY LOU/ATN   BUN/Creat bumped today   Hold losartan and lasix today   Other medical problems  Morbid Obesity   TANYA     >55min spent, >50% spent counseling and coordinating care in the form of educating pt/family and d/w consultants and staff. Most of the time spent discussing the above plan.        Plan of care discussed with patient or family at bedside.    Hayde Brito MD  Hospitalist          Supplementary Documentation:     Quality:  DVT Prophylaxis: heparin   CODE status: Full  Dispo: per clinical course            Estimated date of discharge: TBD  Discharge is dependent on: clinical stability  At this point Ms. Wilde is expected to be discharge to: TBD

## 2024-11-28 NOTE — PROGRESS NOTES
Pulmonary/ICU/Critical Care Progress Note        Reason for Consultation: post op care  Referring Physician: Dr. Gregg    Seen this am.     SUBJECTIVE:  Afebrile still has sign secretions  On ntg @ 30      ALLERGIES:  Allergies[1]      MEDS:  Home Medications:  Medications Taking[2]    Scheduled Medication:   furosemide  40 mg Intravenous TID    potassium chloride  20 mEq Oral BID    amLODIPine  10 mg Oral Daily    losartan  50 mg Oral Daily    sodium chloride  3 mL Nebulization 4 times per day    metoclopramide  10 mg Intravenous Q6H    albuterol  2.5 mg Nebulization 4 times per day    heparin  5,000 Units Subcutaneous Q8H GABRIEL    metoprolol tartrate  25 mg Oral 2x Daily(Beta Blocker)    chlorhexidine gluconate  15 mL Mouth/Throat BID    piperacillin-tazobactam  3.375 g Intravenous Q8H    famotidine  20 mg Oral Daily    Or    famotidine  20 mg Intravenous Daily    aspirin  81 mg Oral Daily     Continuous Infusing Medication:   dextrose 10%      dexmedetomidine 1 mcg/kg/hr (11/28/24 0452)    nitroGLYCERIN in dextrose 5% 30 mcg/min (11/27/24 2000)    norepinephrine Stopped (11/23/24 2225)    dextrose 5%-sodium chloride 0.45% 40 mL/hr (11/27/24 2000)    propofol 45 mcg/kg/min (11/28/24 0312)    norepinephrine      insulin regular 0.5 Units/hr (11/27/24 2000)     PRN Medications:    dextrose 10%    lipase-protease-amylase (Lip-Prot-Amyl) **AND** sodium bicarbonate    ipratropium-albuterol    HYDROcodone-acetaminophen    melatonin    polyethylene glycol (PEG 3350)    bisacodyl    ondansetron    nitroGLYCERIN in dextrose 5%    norepinephrine    potassium chloride **OR** potassium chloride    magnesium sulfate in dextrose 5%    magnesium sulfate in sterile water for injection    morphINE **OR** [DISCONTINUED] morphINE    fentaNYL       PHYSICAL EXAM:  /64 (BP Location: Right arm)   Pulse 69   Temp 98 °F (36.7 °C) (Temporal)   Resp 19   Ht 5' 5\" (1.651 m)   Wt 284 lb 9.8 oz (129.1 kg)   SpO2 97%   BMI 47.36  kg/m²   Vent Mode: VC/AC  FiO2 (%):  [40 %-55 %] 40 %  S RR:  [18] 18  S VT:  [550 mL] 550 mL  PEEP/CPAP (cm H2O):  [5 cm H20] 5 cm H20  MAP (cm H2O):  [12-15] 12    CONSTITUTIONAL: intubated, sedated  HEENT: atraumatic normocephalic  MOUTH: ETT, OGT  NECK/THROAT: no JVD. Trachea midline. No obvious thyromegaly. +right swan  LUNG: vented upper clear BS b/l no wheezing, + crackles. Diminished at bases. Chest symmetric with respiratory motion. + midsternal surgical wound. + chest tube  HEART: regular rate and rhythm, no obvious murmers or gallops noted  ABD: soft non tender right side. + bowel sounds. No organomegaly noted  EXT: no clubbing, cyanosis, or edema noted. Pulses intact grossly  NEURO/MUSCULOSKELETAL: intubated sedated   SKIN: warm, dry. No obvious lesions noted  LYMPH: no obvious LAD      IMAGES:   CXR 11/27/24  Moderate pulmonary edema, progressed since 11/26/2024.     CT c/a/p  CONCLUSION:  Low-lying ETT, 0.8 cm above the jennyfer   Multifocal bilateral pulmonary nodular consolidation s    CXR 11/24/24  FINDINGS:   CARDIAC/VASC: The cardiac silhouette is exaggerated by AP portable technique. There is stable central pulmonary venous congestion.   MEDIAST/HAMLET:   No visible mass or adenopathy.   LUNGS/PLEURA: There are stable streaky opacities at the right lung base.  No pleural effusion or pneumothorax.   BONES: Sternotomy changes are again noted.   OTHER: An endotracheal tube tip projects 3.8 cm above the jennyfer.  An enteric tube again courses into the left upper quadrant.  A right internal jugular approach [place]-Dev catheter tip again projects over the pulmonary outflow tract.  A mediastinal drain tip again projects over the right suprahilar region.     CXR 11/23/24  On my read possible RML infiltrates  CONCLUSION: Post emergent acute type A aortic dissection repair.  Stable cardiomegaly.  Gross stable/satisfactory position of lines and tubes.  Mild pulmonary vascular congestion.       CXR  11/22/24  CONCLUSION: Post feeding tube placement with tip in the distal esophagus approximately 4 cm above the gastroesophageal junction.  Recommend advancement of the tube by approximately 10 cm to place it in the stomach.     CXR 11/22/24  CARDIAC/MEDIASTINUM: The cardiac silhouette is enlarged and unchanged.. There is a right internal jugular [place]-Dev catheter with tip at the level of the main pulmonary artery. There is a right-sided chest tube. Endotracheal tube with tip approximately    5.3 cm above the jennyfer. There is a nasogastric tube with tip and sidehole within the stomach and below the diaphragm. There are median sternotomy wires and postoperative changes of ascending aortic repair.   LUNGS: There is pulmonary vascular redistribution without edema. Minimal blunting of the bilateral costophrenic angles may be secondary to trace pleural effusions versus chronic pleural thickening. There is mild bibasilar atelectasis. No pneumothorax.   BONES: There is degenerative disease of the thoracic spine.     Chest CTA 11/22/24  CONCLUSION:   1. Type a aortic dissection beginning just above right renal (correction:  Right coronary)  artery and extending distally into the left common iliac artery and external iliac.  Findings were called to Dr. Echavarria at 5:52 p.m.       LABS:  Recent Labs   Lab 11/21/24  1720 11/22/24  0133 11/23/24  0418 11/24/24  0430 11/25/24  0509 11/26/24  0546 11/27/24  0847 11/28/24  0409   RBC 4.58   < > 3.56*   < > 3.12* 3.17* 3.13* 2.81*   HGB 12.2   < > 9.6*   < > 8.3* 8.5* 8.5* 7.8*   HCT 38.3   < > 29.9*   < > 26.4* 26.5* 26.5* 23.5*   MCV 83.6   < > 84.0   < > 84.6 83.6 84.7 83.6   MCH 26.6   < > 27.0   < > 26.6 26.8 27.2 27.8   MCHC 31.9   < > 32.1   < > 31.4 32.1 32.1 33.2   RDW 15.9*   < > 16.4*   < > 16.1* 16.2* 16.3* 15.8*   NEPRELIM 3.70  --  11.86*  --  7.00  --   --   --    WBC 6.4   < > 13.4*   < > 9.4 9.9 9.5 9.8   .0   < > 127.0*   < > 102.0* 133.0* 166.0 191.0    < >  = values in this interval not displayed.       Recent Labs   Lab 11/21/24  1720 11/22/24  0251 11/23/24  0418 11/24/24  0430 11/25/24  0509 11/25/24  1746 11/26/24  0546 11/26/24  1821 11/27/24  0847 11/27/24  1820 11/28/24  0409   *   < > 142*   < > 137*   < > 124*   < > 131* 115* 131*  131*   BUN 21   < > 32*   < > 29*   < > 25*   < > 23 25* 26*  26*   CREATSERUM 1.19*   < > 2.28*   < > 1.13*   < > 1.12*   < > 1.09* 1.35* 1.45*  1.45*   EGFRCR 52*   < > 24*   < > 55*   < > 56*   < > 58* 45* 41*  41*   CA 9.6   < > 8.4*   < > 8.4*   < > 8.6*   < > 8.9 8.8 8.9  8.9   ALB 4.3  --  3.4   < > 3.5  --  3.5  --   --   --  3.6   *   < > 144   < > 143   < > 144   < > 144 144 143  143   K 3.4*   < > 4.2   < > 3.3*   < > 3.4*   < > 4.2 3.8 4.0  4.0      < > 112   < > 111   < > 112   < > 113* 112 111  111   CO2 28.0   < > 21.0   < > 23.0   < > 22.0   < > 22.0 22.0 23.0  23.0   ALKPHO 90  --  49*  --  42*  --   --   --   --   --   --    AST 27  --  33  --  20  --   --   --   --   --   --    ALT 22  --  9*  --  <7*  --   --   --   --   --   --    BILT 0.4  --  0.3  --  0.4  --   --   --   --   --   --    TP 6.9  --  5.3*  --  5.4*  --   --   --   --   --   --     < > = values in this interval not displayed.       ASSESSMENT/PLAN:  Type A aortic dissection s/p repair now intubated in ICU  -on ntg gtt as per CV surgery  -chest tube management as per CV    Post op acute respiratory failure  -complicated by extubation and reimergent intubation and significant emesis concerning for aspiration PNA vs pneumonitis  -started on zosyn-continue  -check ETT asp  -failed SBT yesterday d/t increased RR, work of breathing, hypertension, hypoxemia, significant thick secretions  -has a + cuff leak per RT  -may need trach if not extubated by Monday 12/2/24  -hx of likely TANYA and previous smoking hx. Has sign dense consolidations and atelectasis on chest CT. Will benefit from NIV post extubation. Would try BIPAP/AVAPS  post extubation and monitor closely  -PRN ABG and CXR  -saline nebs    Previous hx of smoking and ETOH  -continue duonebs PRN    NAIMA on CKD  -likely from surgery  -monitor UO and renal labs both are improving   -D5 0.45  -renal following     BG  -insulin gtt, D5 and accuchecks    Abd pain  -s/p abd CT as above    Proph:  -DVT: hep sq    Dispo:  -full code    Critical care time 33 min independent of procedures      Thank you for the opportunity to care for Alisha WildeShantell Rainey DO, MPH  Pulmonary Critical Care Medicine  [facility]                       [1]   Allergies  Allergen Reactions    Atorvastatin MYALGIA    Pravastatin MYALGIA    Rosuvastatin MYALGIA    Seasonal Runny nose   [2]   Outpatient Medications Marked as Taking for the 11/21/24 encounter (Hospital Encounter)   Medication Sig Dispense Refill    Acetaminophen ER (TYLENOL 8 HOUR) 650 MG Oral Tab CR Take 2 tablets (1,300 mg total) by mouth daily as needed.      Calcium Carb-Cholecalciferol 600-10 MG-MCG Oral Tab Take 1 tablet by mouth daily.      metoprolol succinate ER 50 MG Oral Tablet 24 Hr Take 1 tablet (50 mg total) by mouth daily. 90 tablet 3    Losartan Potassium-HCTZ 100-12.5 MG Oral Tab Take 1 tablet by mouth daily. 90 tablet 3    Potassium Chloride ER 20 MEQ Oral Tab CR Take 1 tablet by mouth daily. 90 tablet 3    albuterol 108 (90 Base) MCG/ACT Inhalation Aero Soln Inhale 2 puffs into the lungs every 4 (four) hours as needed for Wheezing. 3 each 3    amLODIPine 10 MG Oral Tab Take 1 tablet (10 mg total) by mouth daily. 90 tablet 3    Ascorbic Acid (VITAMIN C) 1000 MG Oral Tab Take 1 tablet (1,000 mg total) by mouth daily.      vitamin B-12 50 MCG Oral Tab Take 1 tablet (50 mcg total) by mouth daily.

## 2024-11-28 NOTE — PROGRESS NOTES
[facility]  part of [facility]    Progress Note      Subjective:     Failed trial of breathing.  Remain intubated and sedated.  Webb with clear urine      Review of Systems:     Unable to obtain    Objective:   Temp:  [97.7 °F (36.5 °C)-98.8 °F (37.1 °C)] 98.1 °F (36.7 °C)  Pulse:  [68-79] 69  Resp:  [18-28] 19  BP: (116-148)/(57-79) 148/79  SpO2:  [97 %-99 %] 99 %  AO: (120-151)/(44-59) 147/58  FiO2 (%):  [40 %-55 %] 40 %  SpO2: 99 %     Intake/Output Summary (Last 24 hours) at 11/28/2024 1038  Last data filed at 11/28/2024 1000  Gross per 24 hour   Intake 4458.1 ml   Output 3250 ml   Net 1208.1 ml     Wt Readings from Last 3 Encounters:   11/27/24 284 lb 9.8 oz (129.1 kg)   10/24/24 254 lb (115.2 kg)   05/17/24 249 lb (112.9 kg)       General appearance: Intubated and awake  Head: Normocephalic, atraumatic  Neck:  no JVD, supple, symmetrical  Skin: No rashes or lesions  Neurologic: unable to examine   [place]bb with clear urine    Medications:  Current Facility-Administered Medications   Medication Dose Route Frequency    [Held by provider] furosemide (Lasix) 10 mg/mL injection 40 mg  40 mg Intravenous TID    potassium chloride (Klor-Con) 20 MEQ oral powder 20 mEq  20 mEq Oral BID    amLODIPine (Norvasc) tab 10 mg  10 mg Oral Daily    losartan (Cozaar) tab 50 mg  50 mg Oral Daily    dextrose 10% infusion (TPN no rate)   Intravenous Continuous PRN    pancrelipase (Lip-Prot-Amyl) (Zenpep) DR particles cap 10,000 Units  10,000 Units Per G Tube PRN    And    sodium bicarbonate tab 325 mg  325 mg Oral PRN    sodium chloride 3 % nebulizer solution 3 mL  3 mL Nebulization 4 times per day    ipratropium-albuterol (Duoneb) 0.5-2.5 (3) MG/3ML inhalation solution 3 mL  3 mL Nebulization Q6H PRN    metoclopramide (Reglan) 5 mg/mL injection 10 mg  10 mg Intravenous Q6H    albuterol (Ventolin) (2.5 MG/3ML) 0.083% nebulizer solution 2.5 mg  2.5 mg Nebulization 4 times per day    HYDROcodone-acetaminophen  (Norco) 5-325 MG per tab 2 tablet  2 tablet Oral Q4H PRN    heparin (Porcine) 5000 UNIT/ML injection 5,000 Units  5,000 Units Subcutaneous Q8H GABRIEL    melatonin tab 3 mg  3 mg Oral Nightly PRN    polyethylene glycol (PEG 3350) (Miralax) 17 g oral packet 17 g  17 g Oral Daily PRN    bisacodyl (Dulcolax) 10 MG rectal suppository 10 mg  10 mg Rectal Daily PRN    ondansetron (Zofran) 4 MG/2ML injection 4 mg  4 mg Intravenous Q6H PRN    dexmedeTOMIDine in sodium chloride 0.9% (Precedex) 400 mcg/100mL infusion premix  0.2-1.5 mcg/kg/hr (Dosing Weight) Intravenous Continuous    nitroGLYCERIN in dextrose 5% 50 mg/250mL infusion premix  5-300 mcg/min Intravenous Continuous PRN    norepinephrine (Levophed) 4 mg/250mL infusion premix  0.5-30 mcg/min Intravenous Continuous PRN    metoprolol tartrate (Lopressor) tab 25 mg  25 mg Oral 2x Daily(Beta Blocker)    potassium chloride 20 mEq/100mL IVPB premix 20 mEq  20 mEq Intravenous PRN    Or    potassium chloride 40 mEq/100mL IVPB premix (central line) 40 mEq  40 mEq Intravenous PRN    magnesium sulfate in dextrose 5% 1 g/100mL infusion premix 1 g  1 g Intravenous PRN    magnesium sulfate in sterile water for injection 2 g/50mL IVPB premix 2 g  2 g Intravenous PRN    chlorhexidine gluconate (Peridex) 0.12 % oral solution 15 mL  15 mL Mouth/Throat BID    dextrose 5%-sodium chloride 0.45% infusion   mL/hr Intravenous Continuous    morphINE PF 2 MG/ML injection 2 mg  2 mg Intravenous Q2H PRN    propofol (Diprivan) 10 mg/mL infusion premix  5-50 mcg/kg/min (Dosing Weight) Intravenous Continuous    fentaNYL (Sublimaze) 50 mcg/mL injection 25 mcg  25 mcg Intravenous Q3H PRN    piperacillin-tazobactam (Zosyn) 3.375 g in dextrose 5% 100 mL IVPB-ADDV  3.375 g Intravenous Q8H    famotidine (Pepcid) tab 20 mg  20 mg Oral Daily    Or    famotidine (Pepcid) 20 mg/2mL injection 20 mg  20 mg Intravenous Daily    aspirin chewable tab 81 mg  81 mg Oral Daily    norepinephrine (Levophed) 4  mg/250mL infusion premix  0.5-30 mcg/min Intravenous Continuous    insulin regular human (Novolin R, Humulin R) 100 Units in sodium chloride 0.9% 100 mL standard infusion (100 mL)  1-40 Units/hr Intravenous Continuous          Results:     Recent Labs   Lab 11/21/24  1720 11/22/24  0133 11/23/24  0418 11/24/24  0430 11/25/24  0509 11/26/24  0546 11/27/24  0847 11/28/24  0409   RBC 4.58   < > 3.56*   < > 3.12* 3.17* 3.13* 2.81*   HGB 12.2   < > 9.6*   < > 8.3* 8.5* 8.5* 7.8*   HCT 38.3   < > 29.9*   < > 26.4* 26.5* 26.5* 23.5*   MCV 83.6   < > 84.0   < > 84.6 83.6 84.7 83.6   NEPRELIM 3.70  --  11.86*  --  7.00  --   --   --    WBC 6.4   < > 13.4*   < > 9.4 9.9 9.5 9.8   .0   < > 127.0*   < > 102.0* 133.0* 166.0 191.0    < > = values in this interval not displayed.     Recent Labs   Lab 11/21/24  1720 11/22/24  0251 11/23/24  0418 11/24/24  0430 11/25/24  0509 11/25/24  1746 11/26/24  0546 11/26/24  1821 11/27/24  1820 11/28/24  0409 11/28/24  0846   *   < > 142*   < > 137*   < > 124*   < > 115* 131*  131* 138*   BUN 21   < > 32*   < > 29*   < > 25*   < > 25* 26*  26* 28*   CREATSERUM 1.19*   < > 2.28*   < > 1.13*   < > 1.12*   < > 1.35* 1.45*  1.45* 1.48*   CA 9.6   < > 8.4*   < > 8.4*   < > 8.6*   < > 8.8 8.9  8.9 9.0   ALB 4.3  --  3.4   < > 3.5  --  3.5  --   --  3.6  --    *   < > 144   < > 143   < > 144   < > 144 143  143 142   K 3.4*   < > 4.2   < > 3.3*   < > 3.4*   < > 3.8 4.0  4.0 4.2      < > 112   < > 111   < > 112   < > 112 111  111 112   CO2 28.0   < > 21.0   < > 23.0   < > 22.0   < > 22.0 23.0  23.0 24.0   ALKPHO 90  --  49*  --  42*  --   --   --   --   --   --    AST 27  --  33  --  20  --   --   --   --   --   --    ALT 22  --  9*  --  <7*  --   --   --   --   --   --    BILT 0.4  --  0.3  --  0.4  --   --   --   --   --   --    TP 6.9  --  5.3*  --  5.4*  --   --   --   --   --   --     < > = values in this interval not displayed.     PTT   Date Value Ref Range  Status   11/22/2024 28.2 23.0 - 36.0 seconds Final     INR   Date Value Ref Range Status   11/22/2024 1.25 (H) 0.80 - 1.20 Final     Comment:     Therapeutic INR range for patients on warfarin:  2.0-3.0 for most medical conditions and surgical prophylaxis   2.5-3.5 for mechanical heart valves and recurrent thromboembolism    Direct thrombin inhibitors (e.g. argatroban, bivalirudin), factor Xa inhibitors (e.g. apixaban, rivaroxaban), and conditions such as coagulation factor deficiency, vitamin K deficiency, and liver disease will prolong the prothrombin time/INR.    Unfractionated heparin and low molecular weight heparin do not affect the prothrombin time/INR up to a concentration of 1 IU/mL and 1.5 IU/mL, respectively.         No results for input(s): \"BNP\" in the last 168 hours.  Recent Labs   Lab 11/25/24  0509 11/26/24  0546 11/28/24  0409   MG 2.2 2.2 2.1   PHOS 3.1 3.3 3.6        Recent Labs   Lab 11/25/24  0509 11/26/24  0546 11/28/24  0409   PHOS 3.1 3.3 3.6   ALB 3.5 3.5 3.6       XR CHEST AP PORTABLE  (CPT=71045)    Result Date: 11/27/2024  CONCLUSION:   Moderate pulmonary edema, progressed since 11/26/2024.  Lines and tubes in place as described.   Dictated by (CST): Terrence Colorado MD on 11/27/2024 at 7:56 AM     Finalized by (CST): Terrence Colorado MD on 11/27/2024 at 8:00 AM               Assessment and Plan:       1.NAIMA on CKD stage III: Nonoliguric on diuretic  NAIMA secondary to MARY LOU/ATN  creatinine stable at 1.1 mg/dL for last few days-elevated today at 1.4.  Hold diuretics and losartan      2.aortic dissection: S/p emergent aortic dissection repair  CT surgery input noted  S/p CT chest abdomen pelvis-multifocal bilateral pulmonary nodular consolidation.  No focal fluid collection at surgery site.  Plan to get 1 unit of packed red blood transfusion    3.respiratory failure: Improve oxygen requirement and currently on 40% FiO2  Currently on Lasix 40 mg IV 3 times daily-will hold  Chest x-ray from today noted  to have moderate pulmonary edema    4 -renal cyst  The renal ultrasound showed a mildly complex cyst in the right kidney.  This will need to be followed up with surveillance ultrasound    Discussed with nursing at bedside       Cardiology/CT surgery/pulmonary input noted    Robbi Resendiz MD

## 2024-11-29 LAB
ANION GAP SERPL CALC-SCNC: 10 MMOL/L (ref 0–18)
ANION GAP SERPL CALC-SCNC: 9 MMOL/L (ref 0–18)
BLOOD TYPE BARCODE: 7300
BUN BLD-MCNC: 28 MG/DL (ref 9–23)
BUN BLD-MCNC: 30 MG/DL (ref 9–23)
BUN/CREAT SERPL: 20 (ref 10–20)
BUN/CREAT SERPL: 22.7 (ref 10–20)
CALCIUM BLD-MCNC: 9.1 MG/DL (ref 8.7–10.4)
CALCIUM BLD-MCNC: 9.1 MG/DL (ref 8.7–10.4)
CHLORIDE SERPL-SCNC: 110 MMOL/L (ref 98–112)
CHLORIDE SERPL-SCNC: 112 MMOL/L (ref 98–112)
CO2 SERPL-SCNC: 19 MMOL/L (ref 21–32)
CO2 SERPL-SCNC: 22 MMOL/L (ref 21–32)
CREAT BLD-MCNC: 1.32 MG/DL
CREAT BLD-MCNC: 1.4 MG/DL
EGFRCR SERPLBLD CKD-EPI 2021: 43 ML/MIN/1.73M2 (ref 60–?)
EGFRCR SERPLBLD CKD-EPI 2021: 46 ML/MIN/1.73M2 (ref 60–?)
GLUCOSE BLD-MCNC: 136 MG/DL (ref 70–99)
GLUCOSE BLD-MCNC: 139 MG/DL (ref 70–99)
GLUCOSE BLDC GLUCOMTR-MCNC: 109 MG/DL (ref 70–99)
GLUCOSE BLDC GLUCOMTR-MCNC: 123 MG/DL (ref 70–99)
GLUCOSE BLDC GLUCOMTR-MCNC: 129 MG/DL (ref 70–99)
GLUCOSE BLDC GLUCOMTR-MCNC: 135 MG/DL (ref 70–99)
GLUCOSE BLDC GLUCOMTR-MCNC: 137 MG/DL (ref 70–99)
GLUCOSE BLDC GLUCOMTR-MCNC: 140 MG/DL (ref 70–99)
GLUCOSE BLDC GLUCOMTR-MCNC: 141 MG/DL (ref 70–99)
GLUCOSE BLDC GLUCOMTR-MCNC: 146 MG/DL (ref 70–99)
OSMOLALITY SERPL CALC.SUM OF ELEC: 300 MOSM/KG (ref 275–295)
OSMOLALITY SERPL CALC.SUM OF ELEC: 300 MOSM/KG (ref 275–295)
POTASSIUM SERPL-SCNC: 4.4 MMOL/L (ref 3.5–5.1)
POTASSIUM SERPL-SCNC: 4.6 MMOL/L (ref 3.5–5.1)
SODIUM SERPL-SCNC: 141 MMOL/L (ref 136–145)
SODIUM SERPL-SCNC: 141 MMOL/L (ref 136–145)
TRIGL SERPL-MCNC: 246 MG/DL (ref 30–149)
UNIT VOLUME: 350 ML

## 2024-11-29 PROCEDURE — 99233 SBSQ HOSP IP/OBS HIGH 50: CPT | Performed by: INTERNAL MEDICINE

## 2024-11-29 PROCEDURE — 99233 SBSQ HOSP IP/OBS HIGH 50: CPT | Performed by: HOSPITALIST

## 2024-11-29 PROCEDURE — 99291 CRITICAL CARE FIRST HOUR: CPT | Performed by: INTERNAL MEDICINE

## 2024-11-29 RX ORDER — LIDOCAINE HYDROCHLORIDE 10 MG/ML
1 INJECTION, SOLUTION EPIDURAL; INFILTRATION; INTRACAUDAL; PERINEURAL ONCE
Status: CANCELLED | OUTPATIENT
Start: 2024-11-29 | End: 2024-11-29

## 2024-11-29 RX ORDER — HYDRALAZINE HYDROCHLORIDE 50 MG/1
50 TABLET, FILM COATED ORAL EVERY 8 HOURS SCHEDULED
Status: DISCONTINUED | OUTPATIENT
Start: 2024-11-29 | End: 2024-11-30

## 2024-11-29 RX ORDER — CARVEDILOL 6.25 MG/1
6.25 TABLET ORAL 2 TIMES DAILY WITH MEALS
Status: DISCONTINUED | OUTPATIENT
Start: 2024-11-29 | End: 2024-12-06

## 2024-11-29 RX ORDER — ISOSORBIDE DINITRATE 20 MG/1
20 TABLET ORAL EVERY 8 HOURS SCHEDULED
Status: DISCONTINUED | OUTPATIENT
Start: 2024-11-29 | End: 2024-11-30

## 2024-11-29 RX ORDER — NITROGLYCERIN 20 MG/100ML
INJECTION INTRAVENOUS CONTINUOUS
Status: DISCONTINUED | OUTPATIENT
Start: 2024-11-29 | End: 2024-11-29

## 2024-11-29 RX ORDER — NITROGLYCERIN 20 MG/100ML
INJECTION INTRAVENOUS CONTINUOUS
Status: DISCONTINUED | OUTPATIENT
Start: 2024-11-29 | End: 2024-12-01

## 2024-11-29 NOTE — PROGRESS NOTES
[facility]  part of [facility]    Cardiology Progress Note    Alisha Wilde Patient Status:  Inpatient    10/25/1965 MRN [id number]   Location [facility] 2W/SW Attending Aguila Villegas MD   Hosp Day # 7 PCP Bridger Avendano MD     Interval History   Remains intubated  On SBP  Remains on nitroglycerin gtt for BP control    Assessment and Plan:   Acute hypoxic respiratory failure, c/b aspiration event requiring re-intubation  Type A aortic dissection  NAIMA on CKD-III, improved  Obesity  Hypertension  EtOH abuse  -presented with acute onset CP   -CT with type A aortic dissection above right coronary artery and extending distally into the left common iliac artery and external iliac   -s/p emergent successful repair on 24  -had aspiration event following self-extubation, now re-intubated  -TTE on  with hyperdynamic LVEF  -continue BP management, currently on nitroglycerin gtt -> will wean   -increase hydralazine to 50mg with isosorbide 20mg TID   -switch metoprolol tartrate 25mg BID to coreg 6.25mg BID for additional BP control, uptitrate as tolerated per HR standpoint   -continue amlodipine 10mg daily   -ARB held by nephrology due to NAIMA/CKD  -volume management per nephrology, on lasix  -CIWA protocol  -monitor rhythm on tele    Objective:   Patient Vitals for the past 24 hrs:   BP Temp Temp src Pulse Resp SpO2 Weight   24 1500 119/66 -- -- 70 18 92 % --   24 1400 124/66 -- -- 71 18 95 % --   24 1300 122/66 -- -- 68 18 97 % --   24 1200 124/68 98.4 °F (36.9 °C) Axillary 68 18 97 % --   24 1100 127/75 -- -- 69 18 98 % --   24 1000 129/71 -- -- 69 18 97 % --   24 0900 128/69 -- -- 70 18 97 % --   24 0800 (!) 143/118 97.7 °F (36.5 °C) Temporal 70 18 98 % --   24 0700 130/70 -- -- 69 18 99 % --   24 0621 -- -- -- -- -- -- 282 lb 3 oz (128 kg)   24 0600 95/53 -- -- 68 21 97 % --   24 0500 121/67 -- -- 70 18  97 % --   11/26/24 0400 119/68 98.4 °F (36.9 °C) Temporal 72 18 98 % --   11/26/24 0200 109/60 -- -- 72 21 96 % --   11/26/24 0130 -- -- -- 72 18 97 % --   11/26/24 0100 142/73 -- -- 71 18 98 % --   11/26/24 0030 -- -- -- 71 18 99 % --   11/26/24 0000 139/70 98.3 °F (36.8 °C) Temporal 70 18 98 % --   11/25/24 2300 130/69 -- -- 72 18 98 % --   11/25/24 2200 135/70 -- -- 71 18 98 % --   11/25/24 2100 138/71 -- -- 71 18 99 % --   11/25/24 2000 132/68 98.1 °F (36.7 °C) Temporal 69 18 99 % --   11/25/24 1900 124/64 -- -- 71 18 98 % --   11/25/24 1800 136/72 -- -- 71 18 98 % --   11/25/24 1700 138/70 -- -- 70 18 98 % --       Intake/Output:   Last 3 shifts:   Intake/Output                   11/24/24 0700 - 11/25/24 0659 11/25/24 0700 - 11/26/24 0659 11/26/24 0700 - 11/27/24 0659       Intake    P.O.  0  0  --    P.O. 0 0 --    I.V.  2276.7  1884.9  --    I.V. 154 615 --    Volume (mL) Insulin 53.9 37 --    Volume (mL) Nitroglycerin 236.9 54.5 --    Volume (mL) Dobutamine 4.5 -- --    Volume (mL) Propofol 477.8 713.9 --    Volume (mL) Dexmedetomidine 352.2 384.5 --    Volume (mL) (dextrose 5%-sodium chloride 0.45% infusion) 997.4 80 --    NG/GT  50  150  60    Intake (mL) (NG/OG Tube Orogastric 16 Fr. Right mouth) 50 150 60    IV PIGGYBACK  600  500  --    Volume (mL) (piperacillin-tazobactam (Zosyn) 3.375 g in dextrose 5% 100 mL IVPB-ADDV) 300 200 --    Volume (mL) (acetaminophen (Ofirmev) 10 mg/mL infusion premix 1,000 mg) 200 100 --    Volume (mL) (potassium chloride 20 mEq/100mL IVPB premix 20 mEq) -- 100 --    Volume (mL) (potassium chloride 40 mEq/100mL IVPB premix (central line) 40 mEq) 100 100 --    Total Intake 2926.7 2534.9 60       Output    Urine  1800  3570  800    Output (mL) (Urethral Catheter Latex;Temperature probe;Double-lumen) 1800 3570 800    Emesis/NG output  550  365  --    Residual volume (ml) (NG/OG Tube Orogastric 16 Fr. Right mouth) -- 5 --    Output (mL) (NG/OG Tube Orogastric 16 Fr. Right mouth)  550 360 --    Chest Tube  188  125  30    Output (mL) ([REMOVED] Chest Tube Anterior Mediastinal 32 Fr.) 48 50 --    Output (mL) (Chest Tube Anterior Pericardial 24 Fr.) 140 75 30    Total Output 2538 4060 830       Net I/O     388.7 -1525.1 -770             Vent Settings: Vent Mode: PS  FiO2 (%):  [40 %] 40 %  S RR:  [18] 18  S VT:  [550 mL] 550 mL  PEEP/CPAP (cm H2O):  [5 cm H20] 5 cm H20  MAP (cm H2O):  [1-17] 1    Hemodynamic parameters (last 24 hours):      Scheduled Meds:    insulin regular human  1-5 Units Subcutaneous 4 times per day    carvedilol  6.25 mg Oral BID with meals    isosorbide dinitrate  20 mg Per NG Tube Q8H GABRIEL    hydrALAZINE  50 mg Oral Q8H GABRIEL    [Held by provider] furosemide  40 mg Intravenous TID    potassium chloride  20 mEq Oral BID    amLODIPine  10 mg Oral Daily    [Held by provider] losartan  50 mg Oral Daily    sodium chloride  3 mL Nebulization 4 times per day    metoclopramide  10 mg Intravenous Q6H    albuterol  2.5 mg Nebulization 4 times per day    heparin  5,000 Units Subcutaneous Q8H GABRIEL    chlorhexidine gluconate  15 mL Mouth/Throat BID    piperacillin-tazobactam  3.375 g Intravenous Q8H    famotidine  20 mg Oral Daily    Or    famotidine  20 mg Intravenous Daily    aspirin  81 mg Oral Daily       Continuous Infusions:    dextrose 10%      dexmedetomidine 0.8 mcg/kg/hr (11/29/24 0845)    norepinephrine Stopped (11/23/24 2225)    propofol 50 mcg/kg/min (11/29/24 0845)       Results:     Lab Results   Component Value Date    WBC 9.4 11/28/2024    HGB 8.6 (L) 11/28/2024    HCT 25.8 (L) 11/28/2024    .0 11/28/2024    CREATSERUM 1.40 (H) 11/29/2024    BUN 28 (H) 11/29/2024     11/29/2024    K 4.4 11/29/2024     11/29/2024    CO2 22.0 11/29/2024     (H) 11/29/2024    CA 9.1 11/29/2024    ALB 3.6 11/28/2024    ALKPHO 42 (L) 11/25/2024    BILT 0.4 11/25/2024    TP 5.4 (L) 11/25/2024    AST 20 11/25/2024    ALT <7 (L) 11/25/2024    PTT 28.2 11/22/2024    INR  1.25 (H) 11/22/2024    TSH 0.704 09/29/2024    NATHALIE 99 11/25/2024    LIP 28 11/25/2024    DDIMER 0.42 12/08/2019    ESRML 15 06/05/2021    MG 2.1 11/28/2024    PHOS 3.6 11/28/2024    TROP <0.045 12/08/2019     (H) 09/23/2021    B12 1,156 (H) 07/23/2018       Recent Labs   Lab 11/28/24  0846 11/28/24 2057 11/29/24  0804   * 119* 139*   BUN 28* 28* 28*   CREATSERUM 1.48* 1.38* 1.40*   CA 9.0 8.9 9.1    141 141   K 4.2 4.1 4.4    110 110   CO2 24.0 22.0 22.0     Recent Labs   Lab 11/23/24  0418 11/24/24  0430 11/25/24  0509 11/26/24  0546 11/27/24  0847 11/28/24  0409 11/28/24  1307   RBC 3.56*   < > 3.12*   < > 3.13* 2.81* 3.15*   HGB 9.6*   < > 8.3*   < > 8.5* 7.8* 8.6*   HCT 29.9*   < > 26.4*   < > 26.5* 23.5* 25.8*   MCV 84.0   < > 84.6   < > 84.7 83.6 81.9   MCH 27.0   < > 26.6   < > 27.2 27.8 27.3   MCHC 32.1   < > 31.4   < > 32.1 33.2 33.3   RDW 16.4*   < > 16.1*   < > 16.3* 15.8* 16.0*   NEPRELIM 11.86*  --  7.00  --   --   --   --    WBC 13.4*   < > 9.4   < > 9.5 9.8 9.4   .0*   < > 102.0*   < > 166.0 191.0 194.0    < > = values in this interval not displayed.       No results for input(s): \"BNPML\" in the last 168 hours.    No results for input(s): \"TROP\", \"CK\" in the last 168 hours.    XR CHEST AP PORTABLE  (CPT=71045)    Result Date: 11/27/2024  CONCLUSION:   Moderate pulmonary edema, progressed since 11/26/2024.  Lines and tubes in place as described.   Dictated by (CST): Terrence Colorado MD on 11/27/2024 at 7:56 AM     Finalized by (CST): Terrence Colorado MD on 11/27/2024 at 8:00 AM                    Exam:     Physical Exam:  General: intubated  HEENT: Normocephalic, anicteric sclera, neck supple, no thyromegaly or adenopathy.  Neck: No JVD, carotids 2+, no bruits.  Cardiac: Regular rate and rhythm. S1, S2 normal.   Lungs: Clear without wheezes, rales, rhonchi or dullness.    Abdomen: Soft, non-tender. No organosplenomegally, mass or rebound, BS-present.  Extremities: Without  clubbing or cyanosis. No edema.    Neurologic: unable to obtain  Skin: Warm and dry.     Edward Montgomery, [facility] [place]

## 2024-11-29 NOTE — PROGRESS NOTES
[facility]  part of Located within [facility]    Progress Note      Subjective:     Receiving trial of breathing.  Patient bit agitated and restless.  On tube feed and getting off of IV fluid and insulin gtt. tube feed on hold giving trial of breathing    Review of Systems:     Unable to obtain    Objective:   Temp:  [97 °F (36.1 °C)-98.6 °F (37 °C)] 97.8 °F (36.6 °C)  Pulse:  [64-80] 69  Resp:  [17-28] 21  BP: (113-158)/(63-78) 139/70  SpO2:  [88 %-100 %] 98 %  AO: ()/() 154/61  FiO2 (%):  [40 %] 40 %  SpO2: 98 %     Intake/Output Summary (Last 24 hours) at 11/29/2024 1403  Last data filed at 11/29/2024 1245  Gross per 24 hour   Intake 3392.86 ml   Output 1865 ml   Net 1527.86 ml     Wt Readings from Last 3 Encounters:   11/29/24 266 lb 5.1 oz (120.8 kg)   10/24/24 254 lb (115.2 kg)   05/17/24 249 lb (112.9 kg)       General appearance: Intubated and awake  Head: Normocephalic, atraumatic  Neck:  no JVD, supple, symmetrical  Skin: No rashes or lesions  Neurologic: unable to examine   Webb with clear urine    Medications:  Current Facility-Administered Medications   Medication Dose Route Frequency    insulin regular human (Novolin R, Humulin R) 100 UNIT/ML injection 1-5 Units  1-5 Units Subcutaneous 4 times per day    carvedilol (Coreg) tab 6.25 mg  6.25 mg Oral BID with meals    isosorbide dinitrate (Isordil) tab 20 mg  20 mg Per NG Tube Q8H GABRIEL    hydrALAZINE (Apresoline) tab 50 mg  50 mg Oral Q8H GABRIEL    [Held by provider] furosemide (Lasix) 10 mg/mL injection 40 mg  40 mg Intravenous TID    potassium chloride (Klor-Con) 20 MEQ oral powder 20 mEq  20 mEq Oral BID    amLODIPine (Norvasc) tab 10 mg  10 mg Oral Daily    [Held by provider] losartan (Cozaar) tab 50 mg  50 mg Oral Daily    dextrose 10% infusion (TPN no rate)   Intravenous Continuous PRN    pancrelipase (Lip-Prot-Amyl) (Zenpep) DR particles cap 10,000 Units  10,000 Units Per G Tube PRN    And    sodium bicarbonate tab 325 mg  325 mg  Oral PRN    sodium chloride 3 % nebulizer solution 3 mL  3 mL Nebulization 4 times per day    ipratropium-albuterol (Duoneb) 0.5-2.5 (3) MG/3ML inhalation solution 3 mL  3 mL Nebulization Q6H PRN    albuterol (Ventolin) (2.5 MG/3ML) 0.083% nebulizer solution 2.5 mg  2.5 mg Nebulization 4 times per day    HYDROcodone-acetaminophen (Norco) 5-325 MG per tab 2 tablet  2 tablet Oral Q4H PRN    heparin (Porcine) 5000 UNIT/ML injection 5,000 Units  5,000 Units Subcutaneous Q8H GABRIEL    melatonin tab 3 mg  3 mg Oral Nightly PRN    polyethylene glycol (PEG 3350) (Miralax) 17 g oral packet 17 g  17 g Oral Daily PRN    bisacodyl (Dulcolax) 10 MG rectal suppository 10 mg  10 mg Rectal Daily PRN    ondansetron (Zofran) 4 MG/2ML injection 4 mg  4 mg Intravenous Q6H PRN    dexmedeTOMIDine in sodium chloride 0.9% (Precedex) 400 mcg/100mL infusion premix  0.2-1.5 mcg/kg/hr (Dosing Weight) Intravenous Continuous    norepinephrine (Levophed) 4 mg/250mL infusion premix  0.5-30 mcg/min Intravenous Continuous PRN    potassium chloride 20 mEq/100mL IVPB premix 20 mEq  20 mEq Intravenous PRN    Or    potassium chloride 40 mEq/100mL IVPB premix (central line) 40 mEq  40 mEq Intravenous PRN    magnesium sulfate in dextrose 5% 1 g/100mL infusion premix 1 g  1 g Intravenous PRN    magnesium sulfate in sterile water for injection 2 g/50mL IVPB premix 2 g  2 g Intravenous PRN    chlorhexidine gluconate (Peridex) 0.12 % oral solution 15 mL  15 mL Mouth/Throat BID    morphINE PF 2 MG/ML injection 2 mg  2 mg Intravenous Q2H PRN    propofol (Diprivan) 10 mg/mL infusion premix  5-50 mcg/kg/min (Dosing Weight) Intravenous Continuous    fentaNYL (Sublimaze) 50 mcg/mL injection 25 mcg  25 mcg Intravenous Q3H PRN    piperacillin-tazobactam (Zosyn) 3.375 g in dextrose 5% 100 mL IVPB-ADDV  3.375 g Intravenous Q8H    famotidine (Pepcid) tab 20 mg  20 mg Oral Daily    Or    famotidine (Pepcid) 20 mg/2mL injection 20 mg  20 mg Intravenous Daily    aspirin  chewable tab 81 mg  81 mg Oral Daily          Results:     Recent Labs   Lab 11/23/24  0418 11/24/24  0430 11/25/24  0509 11/26/24  0546 11/27/24  0847 11/28/24  0409 11/28/24  1307   RBC 3.56*   < > 3.12*   < > 3.13* 2.81* 3.15*   HGB 9.6*   < > 8.3*   < > 8.5* 7.8* 8.6*   HCT 29.9*   < > 26.4*   < > 26.5* 23.5* 25.8*   MCV 84.0   < > 84.6   < > 84.7 83.6 81.9   NEPRELIM 11.86*  --  7.00  --   --   --   --    WBC 13.4*   < > 9.4   < > 9.5 9.8 9.4   .0*   < > 102.0*   < > 166.0 191.0 194.0    < > = values in this interval not displayed.     Recent Labs   Lab 11/23/24  0418 11/24/24  0430 11/25/24  0509 11/25/24  1746 11/26/24  0546 11/26/24  1821 11/28/24  0409 11/28/24  0846 11/28/24 2057 11/29/24  0804   *   < > 137*   < > 124*   < > 131*  131* 138* 119* 139*   BUN 32*   < > 29*   < > 25*   < > 26*  26* 28* 28* 28*   CREATSERUM 2.28*   < > 1.13*   < > 1.12*   < > 1.45*  1.45* 1.48* 1.38* 1.40*   CA 8.4*   < > 8.4*   < > 8.6*   < > 8.9  8.9 9.0 8.9 9.1   ALB 3.4   < > 3.5  --  3.5  --  3.6  --   --   --       < > 143   < > 144   < > 143  143 142 141 141   K 4.2   < > 3.3*   < > 3.4*   < > 4.0  4.0 4.2 4.1 4.4      < > 111   < > 112   < > 111  111 112 110 110   CO2 21.0   < > 23.0   < > 22.0   < > 23.0  23.0 24.0 22.0 22.0   ALKPHO 49*  --  42*  --   --   --   --   --   --   --    AST 33  --  20  --   --   --   --   --   --   --    ALT 9*  --  <7*  --   --   --   --   --   --   --    BILT 0.3  --  0.4  --   --   --   --   --   --   --    TP 5.3*  --  5.4*  --   --   --   --   --   --   --     < > = values in this interval not displayed.     PTT   Date Value Ref Range Status   11/22/2024 28.2 23.0 - 36.0 seconds Final     INR   Date Value Ref Range Status   11/22/2024 1.25 (H) 0.80 - 1.20 Final     Comment:     Therapeutic INR range for patients on warfarin:  2.0-3.0 for most medical conditions and surgical prophylaxis   2.5-3.5 for mechanical heart valves and recurrent  thromboembolism    Direct thrombin inhibitors (e.g. argatroban, bivalirudin), factor Xa inhibitors (e.g. apixaban, rivaroxaban), and conditions such as coagulation factor deficiency, vitamin K deficiency, and liver disease will prolong the prothrombin time/INR.    Unfractionated heparin and low molecular weight heparin do not affect the prothrombin time/INR up to a concentration of 1 IU/mL and 1.5 IU/mL, respectively.         No results for input(s): \"BNP\" in the last 168 hours.  Recent Labs   Lab 11/25/24  0509 11/26/24  0546 11/28/24  0409   MG 2.2 2.2 2.1   PHOS 3.1 3.3 3.6        Recent Labs   Lab 11/25/24  0509 11/26/24  0546 11/28/24  0409   PHOS 3.1 3.3 3.6   ALB 3.5 3.5 3.6       No results found.        Assessment and Plan:       1.NAIMA on CKD stage III: Nonoliguric on diuretic  NAIMA secondary to MARY LOU/ATN  creatinine stable at 1.1 mg/dL for last few days-elevated today at 1.4.  Hold diuretics and losartan  Stable kidney function-patient in positive fluid balance given high intake.  Discussed with nursing  Getting tapered on nitroglycerin drip.  Cardiology input noted hydralazine and isosorbide dose increased.  Metoprolol switched to carvedilol    2.aortic dissection: S/p emergent aortic dissection repair  CT surgery input noted  S/p CT chest abdomen pelvis-multifocal bilateral pulmonary nodular consolidation.  No focal fluid collection at surgery site.  Plan to get 1 unit of packed red blood transfusion    3.respiratory failure: Improve oxygen requirement and currently on 40% FiO2  Currently on Lasix 40 mg IV 3 times daily -currently on hold for NAIMA  Chest x-ray from today noted to have moderate pulmonary edema  Patient not tolerating the trial of breathing-plan to continue trial over the weekend and if unable to then most likely will need tracheostomy and PEG tube placement after weekend    4 -renal cyst  The renal ultrasound showed a mildly complex cyst in the right kidney.  This will need to be followed up  with surveillance ultrasound    Discussed with nursing at bedside      Cardiology/CT surgery/pulmonary input noted    Robbi Resendiz MD

## 2024-11-29 NOTE — PROGRESS NOTES
[facility]  part of [facility]    Progress Note    Alisha Wilde Patient Status:  Inpatient    10/25/1965 MRN [id number]   Location [facility] 2W/SW Attending Hayde Brito MD   Hosp Day # 7 PCP Bridger Avendano MD     Subjective:  Pt currently intubated on full vent support. Sedation just stopped for weaning trial. She does open eyes to verbal stimuli. No visitors at bedside.     Objective:  /69 (BP Location: Right arm)   Pulse 65   Temp 97 °F (36.1 °C) (Temporal)   Resp 18   Ht 5' 5\" (1.651 m)   Wt 266 lb 5.1 oz (120.8 kg)   SpO2 97%   BMI 44.32 kg/m²     Temp (24hrs), Av.9 °F (36.6 °C), Min:97 °F (36.1 °C), Max:98.6 °F (37 °C)      Intake/Output:    Intake/Output Summary (Last 24 hours) at 2024 0825  Last data filed at 2024 0602  Gross per 24 hour   Intake 4675.14 ml   Output  ml   Net 2665.14 ml       Wt Readings from Last 3 Encounters:   24 266 lb 5.1 oz (120.8 kg)   10/24/24 254 lb (115.2 kg)   24 249 lb (112.9 kg)       Allergies:  Allergies[1]    Labs:  Lab Results   Component Value Date    WBC 9.4 2024    HGB 8.6 2024    HCT 25.8 2024    .0 2024    CREATSERUM 1.38 2024    BUN 28 2024     2024    K 4.1 2024     2024    CO2 22.0 2024     2024    CA 8.9 2024       Physical Exam:  Blood pressure 122/69, pulse 65, temperature 97 °F (36.1 °C), temperature source Temporal, resp. rate 18, height 5' 5\" (1.651 m), weight 266 lb 5.1 oz (120.8 kg), SpO2 97%, not currently breastfeeding.  General: NAD  Lungs: Mildly coarse and diminished anteriorly/laterally-continues w/thick secretions  Heart: RRR, S1, S2  Abdomen: Soft/obese, NT/ND, BS+x 4//+BM (last night)-OGT w/TF @ goal  Extremities: Warm, dry, generalized UE / LE edema bilat  Pulses: 2+ bilat DP  Skin: sternotomy w/Provena drsg: CDI   Neurological:  sedation turned off at this time/opens  eyes to verbal stimuli    Assessment/Plan:  S/P EMERGENT AORTIC DISSECTION REPAIR POD # 7  Respiratory Failure-currently on full vent support. Wean as evelyn/extubate when appropriate: SBT this AM as discussed w/ Pulm. Re-intubated on 11/22 per Pulm after emesis episode/ETT removal due to emesis content noted in airway.   ABX for suspected aspiration pneumonia.   CT Chest/Abdomen/Pelvis (non-contrast) 11/26=no obstruction noted/stable. +BM (s)  HTN: continues on NTG-SBP goal= <130/MAP >70. Mgt per Cards  Pain meds as needed  Scds/Heparin SubQ prophylaxis DVT prevention. HIPA 11/25=negative. Plts continue >100,000  Continue ICU status  Expected post op volume overload: mgt per Nephrology. Hx: CKD-limit/avoid nephrotoxic meds  Expected post op anemia: w/o clinical significance/stable-one unit PRBCs transfused yesterday   IV Insulin protocol. No hx: DM-transition today  Hx: Morbid obesity w/BMI >40-plan for RD to see when appropriate  Nutrition per TF via OGT-at goal   Hx: CKD w/mgt per Nephrology consulted on 11/23 for post op NAIMA-following recs  Hx: ETOH abuse-monitor for withdrawals. CIWA protocol PRN   Discharge planning: pt lives alone and has supportive family. Continue to monitor as pt progresses.     Addendum @ 10:30  Pt seen w/CV Surgeon: Dr. Gregg and plan of care discussed. Failed SBT after 20 minutes. Anticipate pt needing Trach/PEG next week.      Plan of care discussed w/RN   Mariela Noel, APRN  11/29/2024  8:25 AM         [1]   Allergies  Allergen Reactions    Atorvastatin MYALGIA    Pravastatin MYALGIA    Rosuvastatin MYALGIA    Seasonal Runny nose

## 2024-11-29 NOTE — SPIRITUAL CARE NOTE
Spiritual Care Visit Note    Patient Name: Alisha Wilde Date of Spiritual Care Visit: 24   : 10/25/1965 Primary Dx: <principal problem not specified>       Referred By: Referral From: Care Coordination    Spiritual Care Taxonomy:    Intended Effects: Build relationship of care and support    Methods: Collaborate with care team member;Offer support    Interventions: Acknowledge current situation;Silent prayer    Visit Type/Summary:     - Spiritual Care: Responded to a request for spiritual care and assessed the patient for spiritual care needs. Consulted with RN prior to visit. Patient is in a ventilator and sedated. No family members present.   remains available as needed for follow up.    Spiritual Care support can be requested via an Epic consult. For urgent/immediate needs, please contact the On Call  at: [facility] [place]: ext [phone]    Raphael UP  Chaplain Resident   [id number]

## 2024-11-29 NOTE — PLAN OF CARE
Patient failed SBT this morning after 20 minutes. She had copious secretions and started to desaturate, and became hypertensive and tachypneic.     She had 1 loose bowel movement. Tube feeds continued. Insulin drip stopped and switched to sliding scale.     Nitroglycerin drip for BP control. BP meds adjusted, discussed with cardiology team.     Problem: Patient Centered Care  Goal: Patient preferences are identified and integrated in the patient's plan of care  Description: Interventions:  - What would you like us to know as we care for you?   - Provide timely, complete, and accurate information to patient/family  - Incorporate patient and family knowledge, values, beliefs, and cultural backgrounds into the planning and delivery of care  - Encourage patient/family to participate in care and decision-making at the level they choose  - Honor patient and family perspectives and choices  Outcome: Progressing     Problem: Diabetes/Glucose Control  Goal: Glucose maintained within prescribed range  Description: INTERVENTIONS:  - Monitor Blood Glucose as ordered  - Assess for signs and symptoms of hyperglycemia and hypoglycemia  - Administer ordered medications to maintain glucose within target range  - Assess barriers to adequate nutritional intake and initiate nutrition consult as needed  - Instruct patient on self management of diabetes  Outcome: Progressing     Problem: Patient/Family Goals  Goal: Patient/Family Long Term Goal  Description: Patient's Long Term Goal: Go home upon discharge    Interventions:  - Monitor labs  - Administer medications  - Pain management  - Patient centered care  - Keep patient and family informed on plan of care   - See additional Care Plan goals for specific interventions  Outcome: Progressing  Goal: Patient/Family Short Term Goal  Description: Patient's Short Term Goal: feel better    Interventions:   - medications  - nutrition  - diagnostics  - discuss with physician/consults  -  education   - include care partners in care  - See additional Care Plan goals for specific interventions  Outcome: Progressing     Problem: CARDIOVASCULAR - ADULT  Goal: Maintains optimal cardiac output and hemodynamic stability  Description: INTERVENTIONS:  - Monitor vital signs, rhythm, and trends  - Monitor for bleeding, hypotension and signs of decreased cardiac output  - Evaluate effectiveness of vasoactive medications to optimize hemodynamic stability  - Monitor arterial and/or venous puncture sites for bleeding and/or hematoma  - Assess quality of pulses, skin color and temperature  - Assess for signs of decreased coronary artery perfusion - ex. Angina  - Evaluate fluid balance, assess for edema, trend weights  Outcome: Progressing  Goal: Absence of cardiac arrhythmias or at baseline  Description: INTERVENTIONS:  - Continuous cardiac monitoring, monitor vital signs, obtain 12 lead EKG if indicated  - Evaluate effectiveness of antiarrhythmic and heart rate control medications as ordered  - Initiate emergency measures for life threatening arrhythmias  - Monitor electrolytes and administer replacement therapy as ordered  Outcome: Progressing     Problem: RESPIRATORY - ADULT  Goal: Achieves optimal ventilation and oxygenation  Description: INTERVENTIONS:  - Assess for changes in respiratory status  - Assess for changes in mentation and behavior  - Position to facilitate oxygenation and minimize respiratory effort  - Oxygen supplementation based on oxygen saturation or ABGs  - Provide Smoking Cessation handout, if applicable  - Encourage broncho-pulmonary hygiene including cough, deep breathe, Incentive Spirometry  - Assess the need for suctioning and perform as needed  - Assess and instruct to report SOB or any respiratory difficulty  - Respiratory Therapy support as indicated  - Manage/alleviate anxiety  - Monitor for signs/symptoms of CO2 retention  Outcome: Progressing     Problem: GASTROINTESTINAL -  ADULT  Goal: Minimal or absence of nausea and vomiting  Description: INTERVENTIONS:  - Maintain adequate hydration with IV or PO as ordered and tolerated  - Nasogastric tube to low intermittent suction as ordered  - Evaluate effectiveness of ordered antiemetic medications  - Provide nonpharmacologic comfort measures as appropriate  - Advance diet as tolerated, if ordered  - Obtain nutritional consult as needed  - Evaluate fluid balance  Outcome: Progressing  Goal: Maintains or returns to baseline bowel function  Description: INTERVENTIONS:  - Assess bowel function  - Maintain adequate hydration with IV or PO as ordered and tolerated  - Evaluate effectiveness of GI medications  - Encourage mobilization and activity  - Obtain nutritional consult as needed  - Establish a toileting routine/schedule  - Consider collaborating with pharmacy to review patient's medication profile  Outcome: Progressing     Problem: METABOLIC/FLUID AND ELECTROLYTES - ADULT  Goal: Glucose maintained within prescribed range  Description: INTERVENTIONS:  - Monitor Blood Glucose as ordered  - Assess for signs and symptoms of hyperglycemia and hypoglycemia  - Administer ordered medications to maintain glucose within target range  - Assess barriers to adequate nutritional intake and initiate nutrition consult as needed  - Instruct patient on self management of diabetes  Outcome: Progressing  Goal: Electrolytes maintained within normal limits  Description: INTERVENTIONS:  - Monitor labs and rhythm and assess patient for signs and symptoms of electrolyte imbalances  - Administer electrolyte replacement as ordered  - Monitor response to electrolyte replacements, including rhythm and repeat lab results as appropriate  - Fluid restriction as ordered  - Instruct patient on fluid and nutrition restrictions as appropriate  Outcome: Progressing  Goal: Hemodynamic stability and optimal renal function maintained  Description: INTERVENTIONS:  - Monitor labs  and assess for signs and symptoms of volume excess or deficit  - Monitor intake, output and patient weight  - Monitor urine specific gravity, serum osmolarity and serum sodium as indicated or ordered  - Monitor response to interventions for patient's volume status, including labs, urine output, blood pressure (other measures as available)  - Encourage oral intake as appropriate  - Instruct patient on fluid and nutrition restrictions as appropriate  Outcome: Progressing     Problem: SKIN/TISSUE INTEGRITY - ADULT  Goal: Skin integrity remains intact  Description: INTERVENTIONS  - Assess and document risk factors for pressure ulcer development  - Assess and document skin integrity  - Monitor for areas of redness and/or skin breakdown  - Initiate interventions, skin care algorithm/standards of care as needed  Outcome: Progressing  Goal: Incision(s), wounds(s) or drain site(s) healing without S/S of infection  Description: INTERVENTIONS:  - Assess and document risk factors for pressure ulcer development  - Assess and document skin integrity  - Assess and document dressing/incision, wound bed, drain sites and surrounding tissue  - Implement wound care per orders  - Initiate isolation precautions as appropriate  - Initiate Pressure Ulcer prevention bundle as indicated  Outcome: Progressing  Goal: Oral mucous membranes remain intact  Description: INTERVENTIONS  - Assess oral mucosa and hygiene practices  - Implement preventative oral hygiene regimen  - Implement oral medicated treatments as ordered  Outcome: Progressing     Problem: HEMATOLOGIC - ADULT  Goal: Maintains hematologic stability  Description: INTERVENTIONS  - Assess for signs and symptoms of bleeding or hemorrhage  - Monitor labs and vital signs for trends  - Administer supportive blood products/factors, fluids and medications as ordered and appropriate  - Administer supportive blood products/factors as ordered and appropriate  Outcome: Progressing  Goal: Free  from bleeding injury  Description: (Example usage: patient with low platelets)  INTERVENTIONS:  - Avoid intramuscular injections, enemas and rectal medication administration  - Ensure safe mobilization of patient  - Hold pressure on venipuncture sites to achieve adequate hemostasis  - Assess for signs and symptoms of internal bleeding  - Monitor lab trends  - Patient is to report abnormal signs of bleeding to staff  - Avoid use of toothpicks and dental floss  - Use electric shaver for shaving  - Use soft bristle tooth brush  - Limit straining and forceful nose blowing  Outcome: Progressing     Problem: MUSCULOSKELETAL - ADULT  Goal: Return mobility to safest level of function  Description: INTERVENTIONS:  - Assess patient stability and activity tolerance for standing, transferring and ambulating w/ or w/o assistive devices  - Assist with transfers and ambulation using safe patient handling equipment as needed  - Ensure adequate protection for wounds/incisions during mobilization  - Obtain PT/OT consults as needed  - Advance activity as appropriate  - Communicate ordered activity level and limitations with patient/family  Outcome: Progressing  Goal: Maintain proper alignment of affected body part  Description: INTERVENTIONS:  - Support and protect limb and body alignment per provider's orders  - Instruct and reinforce with patient and family use of appropriate assistive device and precautions (e.g. spinal or hip dislocation precautions)  Outcome: Progressing     Problem: NEUROLOGICAL - ADULT  Goal: Achieves stable or improved neurological status  Description: INTERVENTIONS  - Assess for and report changes in neurological status  - Initiate measures to prevent increased intracranial pressure  - Maintain blood pressure and fluid volume within ordered parameters to optimize cerebral perfusion and minimize risk of hemorrhage  - Monitor temperature, glucose, and sodium. Initiate appropriate interventions as  ordered  Outcome: Progressing     Problem: Safety Risk - Non-Violent Restraints  Goal: Patient will remain free from self-harm  Description: INTERVENTIONS:  - Apply the least restrictive restraint to prevent harm  - Notify patient and family of reasons restraints applied  - Assess for any contributing factors to confusion (electrolyte disturbances, delirium, medications)  - Discontinue any unnecessary medical devices as soon as possible  - Assess the patient's physical comfort, circulation, skin condition, hydration, nutrition and elimination needs   - Reorient and redirection as needed  - Assess for the need to continue restraints  Outcome: Progressing

## 2024-11-29 NOTE — PLAN OF CARE
Patient remains on full vent support.  Attempted PS this morning, patient only tolerated 15 mins d/t acute desat with increased WOB. Large amount of pink tinged secretions were suctioned.  Patient was placed back on full support. Increase PIPs were noted throughout the shift.      Problem: RESPIRATORY - ADULT  Goal: Achieves optimal ventilation and oxygenation  Description: INTERVENTIONS:  - Assess for changes in respiratory status  - Assess for changes in mentation and behavior  - Position to facilitate oxygenation and minimize respiratory effort  - Oxygen supplementation based on oxygen saturation or ABGs  - Provide Smoking Cessation handout, if applicable  - Encourage broncho-pulmonary hygiene including cough, deep breathe, Incentive Spirometry  - Assess the need for suctioning and perform as needed  - Assess and instruct to report SOB or any respiratory difficulty  - Respiratory Therapy support as indicated  - Manage/alleviate anxiety  - Monitor for signs/symptoms of CO2 retention  Outcome: Progressing

## 2024-11-29 NOTE — PLAN OF CARE
Problem: CARDIOVASCULAR - ADULT  Goal: Maintains optimal cardiac output and hemodynamic stability  Description: INTERVENTIONS:  - Monitor vital signs, rhythm, and trends  - Monitor for bleeding, hypotension and signs of decreased cardiac output  - Evaluate effectiveness of vasoactive medications to optimize hemodynamic stability  - Monitor arterial and/or venous puncture sites for bleeding and/or hematoma  - Assess quality of pulses, skin color and temperature  - Assess for signs of decreased coronary artery perfusion - ex. Angina  - Evaluate fluid balance, assess for edema, trend weights  Outcome: Progressing   POD 7 Emergent repair of Type A Dissection. She continues to be on Nitroglycerine drip to keep her Systolic pressure <140. Cm scope 60-70's, NSR. Her urine output  mls/hour.  Problem: RESPIRATORY - ADULT  Goal: Achieves optimal ventilation and oxygenation  Description: INTERVENTIONS:  - Assess for changes in respiratory status  - Assess for changes in mentation and behavior  - Position to facilitate oxygenation and minimize respiratory effort  - Oxygen supplementation based on oxygen saturation or ABGs  - Provide Smoking Cessation handout, if applicable  - Encourage broncho-pulmonary hygiene including cough, deep breathe, Incentive Spirometry  - Assess the need for suctioning and perform as needed  - Assess and instruct to report SOB or any respiratory difficulty  - Respiratory Therapy support as indicated  - Manage/alleviate anxiety  - Monitor for signs/symptoms of CO2 retention  Outcome: Not Progressing   She is orally intubated on full ventilator support and has been failing SBT. There is a plan for tracheostomy this Monday. She is on Diprivan drip at 50 mcg/kg/min and Precedex at 1 mcg/hr, and she follows simple commands with these drips infusing.   Problem: METABOLIC/FLUID AND ELECTROLYTES - ADULT  Goal: Hemodynamic stability and optimal renal function maintained  Description: INTERVENTIONS:  -  Monitor labs and assess for signs and symptoms of volume excess or deficit  - Monitor intake, output and patient weight  - Monitor urine specific gravity, serum osmolarity and serum sodium as indicated or ordered  - Monitor response to interventions for patient's volume status, including labs, urine output, blood pressure (other measures as available)  - Encourage oral intake as appropriate  - Instruct patient on fluid and nutrition restrictions as appropriate  Outcome: Progressing   She is on Insulin drip at 1 unit/hour on Algorhythm 1. She has adequate urine output. Her electrolytes are fine.  Problem: HEMATOLOGIC - ADULT  Goal: Maintains hematologic stability  Description: INTERVENTIONS  - Assess for signs and symptoms of bleeding or hemorrhage  - Monitor labs and vital signs for trends  - Administer supportive blood products/factors, fluids and medications as ordered and appropriate  - Administer supportive blood products/factors as ordered and appropriate  Outcome: Progressing   She was given a unit of PRBC this morning for Hemoglobin of 7.8 and repeat Hemoglobin post transfusion was up to 8.6.   Problem: Safety Risk - Non-Violent Restraints  Goal: Patient will remain free from self-harm  Description: INTERVENTIONS:  - Apply the least restrictive restraint to prevent harm  - Notify patient and family of reasons restraints applied  - Assess for any contributing factors to confusion (electrolyte disturbances, delirium, medications)  - Discontinue any unnecessary medical devices as soon as possible  - Assess the patient's physical comfort, circulation, skin condition, hydration, nutrition and elimination needs   - Reorient and redirection as needed  - Assess for the need to continue restraints  Outcome: Progressing   She is on bilateral soft wrist restraints to prevent her from pulling out her lines and drains.

## 2024-11-29 NOTE — PLAN OF CARE
Problem: RESPIRATORY - ADULT  Goal: Achieves optimal ventilation and oxygenation  Description: INTERVENTIONS:  - Assess for changes in respiratory status  - Assess for changes in mentation and behavior  - Position to facilitate oxygenation and minimize respiratory effort  - Oxygen supplementation based on oxygen saturation or ABGs  - Provide Smoking Cessation handout, if applicable  - Encourage broncho-pulmonary hygiene including cough, deep breathe, Incentive Spirometry  - Assess the need for suctioning and perform as needed  - Assess and instruct to report SOB or any respiratory difficulty  - Respiratory Therapy support as indicated  - Manage/alleviate anxiety  - Monitor for signs/symptoms of CO2 retention  11/28/2024 1812 by Mine Phelps, P  Outcome: Progressing    Received patient on vent settings of VC+ 18, vt 500, peep +5, fio2 40%  ETT 7.5 secured 25 @ the lip, miranda. Breath sound auscultated and suctioned large amount of pink tinged thick secretions out through ETT. Given ordered nebs, No other changes or acute events on this shift, RT will continue to monitor.

## 2024-11-29 NOTE — PROGRESS NOTES
11/29/24 0247   Vent Information   Vent Mode VC+   Settings   FiO2 (%) 40 %   Resp Rate (Set) 18   Vt (Set, mL) 550 mL   PEEP/CPAP (cm H2O) 5 cm H20   ETT   Placement Date/Time: 11/22/24 1430   Airway Size: 7.5 mm  Cuffed: Cuffed  Insertion attempts: 2  Technique: Rapid sequence;Video laryngoscopy;Flexible bronchoscopy;Stylet  Placement Verification: Bronchoscopy;Capnometry;Colorimetric EtCO2 device  Plac...   Secured at (cm) 25 cm     Pt. vitals and SpO2 are stable, no acute changes. B.S. ausculted. Suction is provided. RT will continue to monitor.

## 2024-11-29 NOTE — PLAN OF CARE
Had BM this morning. Tolerating vent overnight without issue. Webb UOP fair overnight. Still remains on insulin, nitroglycerin, propofol, and precedex drip.     Problem: Patient Centered Care  Goal: Patient preferences are identified and integrated in the patient's plan of care  Description: Interventions:  - What would you like us to know as we care for you?   - Provide timely, complete, and accurate information to patient/family  - Incorporate patient and family knowledge, values, beliefs, and cultural backgrounds into the planning and delivery of care  - Encourage patient/family to participate in care and decision-making at the level they choose  - Honor patient and family perspectives and choices  Outcome: Progressing     Problem: CARDIOVASCULAR - ADULT  Goal: Maintains optimal cardiac output and hemodynamic stability  Description: INTERVENTIONS:  - Monitor vital signs, rhythm, and trends  - Monitor for bleeding, hypotension and signs of decreased cardiac output  - Evaluate effectiveness of vasoactive medications to optimize hemodynamic stability  - Monitor arterial and/or venous puncture sites for bleeding and/or hematoma  - Assess quality of pulses, skin color and temperature  - Assess for signs of decreased coronary artery perfusion - ex. Angina  - Evaluate fluid balance, assess for edema, trend weights  Outcome: Progressing  Goal: Absence of cardiac arrhythmias or at baseline  Description: INTERVENTIONS:  - Continuous cardiac monitoring, monitor vital signs, obtain 12 lead EKG if indicated  - Evaluate effectiveness of antiarrhythmic and heart rate control medications as ordered  - Initiate emergency measures for life threatening arrhythmias  - Monitor electrolytes and administer replacement therapy as ordered  Outcome: Progressing     Problem: RESPIRATORY - ADULT  Goal: Achieves optimal ventilation and oxygenation  Description: INTERVENTIONS:  - Assess for changes in respiratory status  - Assess for changes  in mentation and behavior  - Position to facilitate oxygenation and minimize respiratory effort  - Oxygen supplementation based on oxygen saturation or ABGs  - Provide Smoking Cessation handout, if applicable  - Encourage broncho-pulmonary hygiene including cough, deep breathe, Incentive Spirometry  - Assess the need for suctioning and perform as needed  - Assess and instruct to report SOB or any respiratory difficulty  - Respiratory Therapy support as indicated  - Manage/alleviate anxiety  - Monitor for signs/symptoms of CO2 retention  Outcome: Progressing     Problem: GASTROINTESTINAL - ADULT  Goal: Minimal or absence of nausea and vomiting  Description: INTERVENTIONS:  - Maintain adequate hydration with IV or PO as ordered and tolerated  - Nasogastric tube to low intermittent suction as ordered  - Evaluate effectiveness of ordered antiemetic medications  - Provide nonpharmacologic comfort measures as appropriate  - Advance diet as tolerated, if ordered  - Obtain nutritional consult as needed  - Evaluate fluid balance  Outcome: Progressing  Goal: Maintains or returns to baseline bowel function  Description: INTERVENTIONS:  - Assess bowel function  - Maintain adequate hydration with IV or PO as ordered and tolerated  - Evaluate effectiveness of GI medications  - Encourage mobilization and activity  - Obtain nutritional consult as needed  - Establish a toileting routine/schedule  - Consider collaborating with pharmacy to review patient's medication profile  Outcome: Progressing     Problem: METABOLIC/FLUID AND ELECTROLYTES - ADULT  Goal: Electrolytes maintained within normal limits  Description: INTERVENTIONS:  - Monitor labs and rhythm and assess patient for signs and symptoms of electrolyte imbalances  - Administer electrolyte replacement as ordered  - Monitor response to electrolyte replacements, including rhythm and repeat lab results as appropriate  - Fluid restriction as ordered  - Instruct patient on fluid  and nutrition restrictions as appropriate  Outcome: Progressing  Goal: Hemodynamic stability and optimal renal function maintained  Description: INTERVENTIONS:  - Monitor labs and assess for signs and symptoms of volume excess or deficit  - Monitor intake, output and patient weight  - Monitor urine specific gravity, serum osmolarity and serum sodium as indicated or ordered  - Monitor response to interventions for patient's volume status, including labs, urine output, blood pressure (other measures as available)  - Encourage oral intake as appropriate  - Instruct patient on fluid and nutrition restrictions as appropriate  Outcome: Progressing     Problem: SKIN/TISSUE INTEGRITY - ADULT  Goal: Skin integrity remains intact  Description: INTERVENTIONS  - Assess and document risk factors for pressure ulcer development  - Assess and document skin integrity  - Monitor for areas of redness and/or skin breakdown  - Initiate interventions, skin care algorithm/standards of care as needed  Outcome: Progressing  Goal: Incision(s), wounds(s) or drain site(s) healing without S/S of infection  Description: INTERVENTIONS:  - Assess and document risk factors for pressure ulcer development  - Assess and document skin integrity  - Assess and document dressing/incision, wound bed, drain sites and surrounding tissue  - Implement wound care per orders  - Initiate isolation precautions as appropriate  - Initiate Pressure Ulcer prevention bundle as indicated  Outcome: Progressing  Goal: Oral mucous membranes remain intact  Description: INTERVENTIONS  - Assess oral mucosa and hygiene practices  - Implement preventative oral hygiene regimen  - Implement oral medicated treatments as ordered  Outcome: Progressing     Problem: HEMATOLOGIC - ADULT  Goal: Maintains hematologic stability  Description: INTERVENTIONS  - Assess for signs and symptoms of bleeding or hemorrhage  - Monitor labs and vital signs for trends  - Administer supportive blood  products/factors, fluids and medications as ordered and appropriate  - Administer supportive blood products/factors as ordered and appropriate  Outcome: Progressing  Goal: Free from bleeding injury  Description: (Example usage: patient with low platelets)  INTERVENTIONS:  - Avoid intramuscular injections, enemas and rectal medication administration  - Ensure safe mobilization of patient  - Hold pressure on venipuncture sites to achieve adequate hemostasis  - Assess for signs and symptoms of internal bleeding  - Monitor lab trends  - Patient is to report abnormal signs of bleeding to staff  - Avoid use of toothpicks and dental floss  - Use electric shaver for shaving  - Use soft bristle tooth brush  - Limit straining and forceful nose blowing  Outcome: Progressing     Problem: MUSCULOSKELETAL - ADULT  Goal: Return mobility to safest level of function  Description: INTERVENTIONS:  - Assess patient stability and activity tolerance for standing, transferring and ambulating w/ or w/o assistive devices  - Assist with transfers and ambulation using safe patient handling equipment as needed  - Ensure adequate protection for wounds/incisions during mobilization  - Obtain PT/OT consults as needed  - Advance activity as appropriate  - Communicate ordered activity level and limitations with patient/family  Outcome: Progressing  Goal: Maintain proper alignment of affected body part  Description: INTERVENTIONS:  - Support and protect limb and body alignment per provider's orders  - Instruct and reinforce with patient and family use of appropriate assistive device and precautions (e.g. spinal or hip dislocation precautions)  Outcome: Progressing     Problem: NEUROLOGICAL - ADULT  Goal: Achieves stable or improved neurological status  Description: INTERVENTIONS  - Assess for and report changes in neurological status  - Initiate measures to prevent increased intracranial pressure  - Maintain blood pressure and fluid volume within  ordered parameters to optimize cerebral perfusion and minimize risk of hemorrhage  - Monitor temperature, glucose, and sodium. Initiate appropriate interventions as ordered  Outcome: Progressing     Problem: Diabetes/Glucose Control  Goal: Glucose maintained within prescribed range  Description: INTERVENTIONS:  - Monitor Blood Glucose as ordered  - Assess for signs and symptoms of hyperglycemia and hypoglycemia  - Administer ordered medications to maintain glucose within target range  - Assess barriers to adequate nutritional intake and initiate nutrition consult as needed  - Instruct patient on self management of diabetes  Outcome: Not Progressing     Problem: Patient/Family Goals  Goal: Patient/Family Long Term Goal  Description: Patient's Long Term Goal:    Interventions:  - See additional Care Plan goals for specific interventions  Outcome: Not Progressing  Goal: Patient/Family Short Term Goal  Description: Patient's Short Term Goal:    Interventions:   - See additional Care Plan goals for specific interventions  Outcome: Not Progressing     Problem: METABOLIC/FLUID AND ELECTROLYTES - ADULT  Goal: Glucose maintained within prescribed range  Description: INTERVENTIONS:  - Monitor Blood Glucose as ordered  - Assess for signs and symptoms of hyperglycemia and hypoglycemia  - Administer ordered medications to maintain glucose within target range  - Assess barriers to adequate nutritional intake and initiate nutrition consult as needed  - Instruct patient on self management of diabetes  Outcome: Not Progressing     Problem: Safety Risk - Non-Violent Restraints  Goal: Patient will remain free from self-harm  Description: INTERVENTIONS:  - Apply the least restrictive restraint to prevent harm  - Notify patient and family of reasons restraints applied  - Assess for any contributing factors to confusion (electrolyte disturbances, delirium, medications)  - Discontinue any unnecessary medical devices as soon as possible  -  Assess the patient's physical comfort, circulation, skin condition, hydration, nutrition and elimination needs   - Reorient and redirection as needed  - Assess for the need to continue restraints  11/29/2024 0650 by Christopher Mars, RN  Outcome: Not Progressing  11/28/2024 2240 by Christopher Mars, RN  Outcome: Not Progressing

## 2024-11-29 NOTE — PROGRESS NOTES
[facility]  part of [facility]    Progress Note    Alisha Wilde Patient Status:  Inpatient    10/25/1965 MRN [id number]   Location [facility] 2W/SW Attending Hayde Brito MD   Hosp Day # 7 PCP Bridger Avendano MD     Chief Complaint:   Chief Complaint   Patient presents with    Chest Pain Angina       Subjective:   Alisha Wilde is intubated. She failed her SBT this morning.   Objective:   Objective:    Blood pressure 139/70, pulse 69, temperature 97.8 °F (36.6 °C), temperature source Temporal, resp. rate 21, height 5' 5\" (1.651 m), weight 266 lb 5.1 oz (120.8 kg), SpO2 98%, not currently breastfeeding.    Physical Exam:    General: No acute distress. Intubated.   Respiratory: Diminished bases   Cardiovascular: S1, S2. Regular rate and rhythm. No murmurs, rubs or gallops.   Abdomen: Soft, nontender, nondistended.  Positive bowel sounds. No rebound or guarding.  Neurologic: No focal neurological deficits.   Musculoskeletal: Moves all extremities.  Extremities: No edema.      Results:   Results:    Labs:  Recent Labs   Lab 24  0509 24  0546 24  0847 24  0409 24  1307   WBC 9.4 9.9 9.5 9.8 9.4   HGB 8.3* 8.5* 8.5* 7.8* 8.6*   MCV 84.6 83.6 84.7 83.6 81.9   .0* 133.0* 166.0 191.0 194.0       Recent Labs   Lab 24  0418 24  0430 24  0509 24  1746 24  0546 24  1821 24  0409 24  0846 24  0804   *   < > 137*   < > 124*   < > 131*  131* 138* 119* 139*   BUN 32*   < > 29*   < > 25*   < > 26*  26* 28* 28* 28*   CREATSERUM 2.28*   < > 1.13*   < > 1.12*   < > 1.45*  1.45* 1.48* 1.38* 1.40*   CA 8.4*   < > 8.4*   < > 8.6*   < > 8.9  8.9 9.0 8.9 9.1   ALB 3.4   < > 3.5  --  3.5  --  3.6  --   --   --       < > 143   < > 144   < > 143  143 142 141 141   K 4.2   < > 3.3*   < > 3.4*   < > 4.0  4.0 4.2 4.1 4.4      < > 111   < > 112   < > 111  111 112 110 110   CO2 21.0   <  > 23.0   < > 22.0   < > 23.0  23.0 24.0 22.0 22.0   ALKPHO 49*  --  42*  --   --   --   --   --   --   --    AST 33  --  20  --   --   --   --   --   --   --    ALT 9*  --  <7*  --   --   --   --   --   --   --    BILT 0.3  --  0.4  --   --   --   --   --   --   --    TP 5.3*  --  5.4*  --   --   --   --   --   --   --     < > = values in this interval not displayed.       Estimated Creatinine Clearance: 38 mL/min (A) (based on SCr of 1.4 mg/dL (H)).    No results for input(s): \"PTP\", \"INR\" in the last 168 hours.           Culture:  No results found for this visit on 11/21/24.    Cardiac  No results for input(s): \"TROP\", \"PBNP\" in the last 168 hours.        Imaging: Imaging data reviewed in Epic.  No results found.    Medications:    insulin regular human  1-5 Units Subcutaneous 4 times per day    carvedilol  6.25 mg Oral BID with meals    isosorbide dinitrate  20 mg Per NG Tube Q8H GABRIEL    hydrALAZINE  50 mg Oral Q8H GABRIEL    [Held by provider] furosemide  40 mg Intravenous TID    potassium chloride  20 mEq Oral BID    amLODIPine  10 mg Oral Daily    [Held by provider] losartan  50 mg Oral Daily    sodium chloride  3 mL Nebulization 4 times per day    albuterol  2.5 mg Nebulization 4 times per day    heparin  5,000 Units Subcutaneous Q8H GABRIEL    chlorhexidine gluconate  15 mL Mouth/Throat BID    piperacillin-tazobactam  3.375 g Intravenous Q8H    famotidine  20 mg Oral Daily    Or    famotidine  20 mg Intravenous Daily    aspirin  81 mg Oral Daily         Assessment and Plan:   Assessment & Plan:      Type A aortic dissection   S/p emergent repair 11/22/24   Off NTG gtt.   CV surgery, cards and critical care on consult.   TTE  Cont amlodipine. Switch metoprolol to coreg.   Acute hypoxic respiratory failure   Aspiration event   IV zosyn.   Failed SBT. Will need to consider trach and PEG Monday   Pulmonary on consult.   Diuresed well with lasix. Now off.   H/o tobacco and ETOH abuse   Duonebs PRN   NAIMA on CKD III    Renal on consult.   Marinette to be secondary to MARY LOU/ATN   BUN/Creat bumped today   Hold losartan and lasix   Essential HTN   Coreg, norvasc, increase hydralazine to 50mg TID with isosorbide 20mg TID.   ARB on hold due to NAIMA.   Weaning off NTG gtt  Other medical problems  Morbid Obesity   TANYA     >55min spent, >50% spent counseling and coordinating care in the form of educating pt/family and d/w consultants and staff. Most of the time spent discussing the above plan.        Plan of care discussed with patient or family at bedside.    Hayde Brito MD  Hospitalist          Supplementary Documentation:     Quality:  DVT Prophylaxis: heparin   CODE status: Full  Dispo: per clinical course            Estimated date of discharge: TBD  Discharge is dependent on: clinical stability  At this point Ms. Wilde is expected to be discharge to: TBD

## 2024-11-29 NOTE — CM/SW NOTE
11/29/24 1100   Discharge Needs   Anticipated D/C needs Long term care facility     CM sent referrals proactively for LTAC in case this level of service is required at dc.    CM/SW will follow-up next week to see if this is the appropriate direction for dc planning needs.    Zohra GONZALEZA BSN RN CRRN   RN Case Manager  [phone]

## 2024-11-29 NOTE — PROGRESS NOTES
[facility]  part of [facility]     Progress Note    Alisha Wilde Patient Status:  Inpatient    10/25/1965 MRN [id number]   Location [facility] 2W/SW Attending Aguila Villegas MD   Hosp Day # 7 PCP Bridger Avendano MD       Subjective:   Patient seen and examined.  Intubated, sedated.  No significant distress off sedation however significant tachypnea, hypoxia noticed during breathing trial which patient only tolerated for less than 20 minutes.    Objective:   Blood pressure 122/69, pulse 76, temperature 97 °F (36.1 °C), temperature source Temporal, resp. rate (!) 28, height 5' 5\" (1.651 m), weight 266 lb 5.1 oz (120.8 kg), SpO2 (!) 88%, not currently breastfeeding.  Intake/Output:   Last 3 shifts: I/O last 3 completed shifts:  In: 8154.9 [I.V.:5762.9; Blood:401; NG/GT:1491; IV PIGGYBACK:500]  Out: 4010 [Urine:4010]   This shift: No intake/output data recorded.     Vent Settings: Vent Mode: PS  FiO2 (%):  [40 %] 40 %  S RR:  [18] 18  S VT:  [550 mL] 550 mL  PEEP/CPAP (cm H2O):  [5 cm H20] 5 cm H20  MAP (cm H2O):  [1-17] 1    Hemodynamic parameters (last 24 hours):      Scheduled Meds:   Current Facility-Administered Medications   Medication Dose Route Frequency    insulin regular human (Novolin R, Humulin R) 100 UNIT/ML injection 1-5 Units  1-5 Units Subcutaneous 4 times per day    isosorbide dinitrate (Isordil) tab 20 mg  20 mg Per NG Tube TID (Nitrates)    hydrALAZINE (Apresoline) tab 25 mg  25 mg Oral Q8H GABRIEL    [Held by provider] furosemide (Lasix) 10 mg/mL injection 40 mg  40 mg Intravenous TID    potassium chloride (Klor-Con) 20 MEQ oral powder 20 mEq  20 mEq Oral BID    amLODIPine (Norvasc) tab 10 mg  10 mg Oral Daily    [Held by provider] losartan (Cozaar) tab 50 mg  50 mg Oral Daily    dextrose 10% infusion (TPN no rate)   Intravenous Continuous PRN    pancrelipase (Lip-Prot-Amyl) (Zenpep) DR particles cap 10,000 Units  10,000 Units Per G Tube PRN    And    sodium  bicarbonate tab 325 mg  325 mg Oral PRN    sodium chloride 3 % nebulizer solution 3 mL  3 mL Nebulization 4 times per day    ipratropium-albuterol (Duoneb) 0.5-2.5 (3) MG/3ML inhalation solution 3 mL  3 mL Nebulization Q6H PRN    metoclopramide (Reglan) 5 mg/mL injection 10 mg  10 mg Intravenous Q6H    albuterol (Ventolin) (2.5 MG/3ML) 0.083% nebulizer solution 2.5 mg  2.5 mg Nebulization 4 times per day    HYDROcodone-acetaminophen (Norco) 5-325 MG per tab 2 tablet  2 tablet Oral Q4H PRN    heparin (Porcine) 5000 UNIT/ML injection 5,000 Units  5,000 Units Subcutaneous Q8H GABRIEL    melatonin tab 3 mg  3 mg Oral Nightly PRN    polyethylene glycol (PEG 3350) (Miralax) 17 g oral packet 17 g  17 g Oral Daily PRN    bisacodyl (Dulcolax) 10 MG rectal suppository 10 mg  10 mg Rectal Daily PRN    ondansetron (Zofran) 4 MG/2ML injection 4 mg  4 mg Intravenous Q6H PRN    dexmedeTOMIDine in sodium chloride 0.9% (Precedex) 400 mcg/100mL infusion premix  0.2-1.5 mcg/kg/hr (Dosing Weight) Intravenous Continuous    norepinephrine (Levophed) 4 mg/250mL infusion premix  0.5-30 mcg/min Intravenous Continuous PRN    metoprolol tartrate (Lopressor) tab 25 mg  25 mg Oral 2x Daily(Beta Blocker)    potassium chloride 20 mEq/100mL IVPB premix 20 mEq  20 mEq Intravenous PRN    Or    potassium chloride 40 mEq/100mL IVPB premix (central line) 40 mEq  40 mEq Intravenous PRN    magnesium sulfate in dextrose 5% 1 g/100mL infusion premix 1 g  1 g Intravenous PRN    magnesium sulfate in sterile water for injection 2 g/50mL IVPB premix 2 g  2 g Intravenous PRN    chlorhexidine gluconate (Peridex) 0.12 % oral solution 15 mL  15 mL Mouth/Throat BID    morphINE PF 2 MG/ML injection 2 mg  2 mg Intravenous Q2H PRN    propofol (Diprivan) 10 mg/mL infusion premix  5-50 mcg/kg/min (Dosing Weight) Intravenous Continuous    fentaNYL (Sublimaze) 50 mcg/mL injection 25 mcg  25 mcg Intravenous Q3H PRN    piperacillin-tazobactam (Zosyn) 3.375 g in dextrose 5% 100  mL IVPB-ADDV  3.375 g Intravenous Q8H    famotidine (Pepcid) tab 20 mg  20 mg Oral Daily    Or    famotidine (Pepcid) 20 mg/2mL injection 20 mg  20 mg Intravenous Daily    aspirin chewable tab 81 mg  81 mg Oral Daily       Continuous Infusions:    dextrose 10%      dexmedetomidine 0.8 mcg/kg/hr (11/29/24 0845)    norepinephrine Stopped (11/23/24 2225)    propofol 50 mcg/kg/min (11/29/24 0845)       Physical Exam  Constitutional: no acute distress, intubated, sedated  Eyes: PERRL  ENT: nares pateint  Neck: supple, no JVD  Cardio: RRR, S1 S2  Respiratory: Bilateral crackles  GI: abdomen soft, non tender, active bowel sounds, no organomegaly  Extremities: no clubbing, cyanosis, edema  Neurologic: no gross motor deficits  Skin: warm, dry      Results:     Lab Results   Component Value Date    WBC 9.4 11/28/2024    HGB 8.6 11/28/2024    HCT 25.8 11/28/2024    .0 11/28/2024    CREATSERUM 1.40 11/29/2024    BUN 28 11/29/2024     11/29/2024    K 4.4 11/29/2024     11/29/2024    CO2 22.0 11/29/2024     11/29/2024    CA 9.1 11/29/2024       No results found.          Assessment   1.  Type A aortic dissection status postrepair  2.  Acute hypoxemic respiratory failure  3.  Acute kidney injury on chronic kidney disease  4.  Aspiration pneumonia  5.  Anemia   6.  Prior nicotine dependence  7.  EtOH abuse     Plan   -Patient presenting with evidence of type a aortic dissection status post repair by CV surgery on 11/21/2024.  -Postoperative respiratory failure complicated by self extubation with emesis and aspiration noted afterwards requiring reintubation.  -Continue antibiotic therapy with Zosyn at this time  -Did not tolerate spontaneous breathing trial today with significant tachypnea and hypoxia hypotension noted.  Significant secretions also noted requiring frequent suctioning.  Will discuss further with CV surgery.  If unable to extubate throughout week will likely need to consider tracheostomy and  PEG tube placement.  -Frequent suctioning.  Bronchial hygiene.  Sputum culture pending from 11/28/2024  -CT chest abdomen pelvis on 11/26/2024 with bilateral multi lobar nodular consolidation seen with some atelectatic changes present.  -Bronchial hygiene.  -Continue tube feedings  -Closely monitor renal function and urine output  -DVT prophylaxis: Heparin  -Discussed with nursing staff    Critical care time spent 30 minutes    Dmitri Herman DO  Pulmonary Critical Care Medicine  [facility]

## 2024-11-30 ENCOUNTER — APPOINTMENT (OUTPATIENT)
Dept: GENERAL RADIOLOGY | Facility: HOSPITAL | Age: 59
DRG: 003 | End: 2024-11-30
Attending: HOSPITALIST
Payer: COMMERCIAL

## 2024-11-30 ENCOUNTER — APPOINTMENT (OUTPATIENT)
Dept: GENERAL RADIOLOGY | Facility: HOSPITAL | Age: 59
End: 2024-11-30
Attending: HOSPITALIST
Payer: COMMERCIAL

## 2024-11-30 LAB
ALBUMIN SERPL-MCNC: 3.5 G/DL (ref 3.2–4.8)
ANION GAP SERPL CALC-SCNC: 8 MMOL/L (ref 0–18)
ANION GAP SERPL CALC-SCNC: 9 MMOL/L (ref 0–18)
BASOPHILS # BLD: 0.12 X10(3) UL (ref 0–0.2)
BASOPHILS NFR BLD: 1 %
BUN BLD-MCNC: 29 MG/DL (ref 9–23)
BUN BLD-MCNC: 30 MG/DL (ref 9–23)
BUN/CREAT SERPL: 21.3 (ref 10–20)
BUN/CREAT SERPL: 21.5 (ref 10–20)
CALCIUM BLD-MCNC: 9.2 MG/DL (ref 8.7–10.4)
CALCIUM BLD-MCNC: 9.5 MG/DL (ref 8.7–10.4)
CHLORIDE SERPL-SCNC: 113 MMOL/L (ref 98–112)
CHLORIDE SERPL-SCNC: 114 MMOL/L (ref 98–112)
CO2 SERPL-SCNC: 20 MMOL/L (ref 21–32)
CO2 SERPL-SCNC: 21 MMOL/L (ref 21–32)
CREAT BLD-MCNC: 1.35 MG/DL
CREAT BLD-MCNC: 1.41 MG/DL
DEPRECATED RDW RBC AUTO: 49.4 FL (ref 35.1–46.3)
EGFRCR SERPLBLD CKD-EPI 2021: 42 ML/MIN/1.73M2 (ref 60–?)
EGFRCR SERPLBLD CKD-EPI 2021: 45 ML/MIN/1.73M2 (ref 60–?)
EOSINOPHIL # BLD: 0.73 X10(3) UL (ref 0–0.7)
EOSINOPHIL NFR BLD: 6 %
ERYTHROCYTE [DISTWIDTH] IN BLOOD BY AUTOMATED COUNT: 16.2 % (ref 11–15)
GLUCOSE BLD-MCNC: 130 MG/DL (ref 70–99)
GLUCOSE BLD-MCNC: 157 MG/DL (ref 70–99)
GLUCOSE BLDC GLUCOMTR-MCNC: 141 MG/DL (ref 70–99)
GLUCOSE BLDC GLUCOMTR-MCNC: 149 MG/DL (ref 70–99)
GLUCOSE BLDC GLUCOMTR-MCNC: 164 MG/DL (ref 70–99)
GLUCOSE BLDC GLUCOMTR-MCNC: 205 MG/DL (ref 70–99)
HCT VFR BLD AUTO: 26.3 %
HGB BLD-MCNC: 8.8 G/DL
LYMPHOCYTES NFR BLD: 1.22 X10(3) UL (ref 1–4)
LYMPHOCYTES NFR BLD: 10 %
MAGNESIUM SERPL-MCNC: 2.3 MG/DL (ref 1.6–2.6)
MCH RBC QN AUTO: 27.8 PG (ref 26–34)
MCHC RBC AUTO-ENTMCNC: 33.5 G/DL (ref 31–37)
MCV RBC AUTO: 83.2 FL
METAMYELOCYTES # BLD: 0.12 X10(3) UL
METAMYELOCYTES NFR BLD: 1 %
MONOCYTES # BLD: 1.34 X10(3) UL (ref 0.1–1)
MONOCYTES NFR BLD: 11 %
NEUTROPHILS # BLD AUTO: 7.59 X10 (3) UL (ref 1.5–7.7)
NEUTROPHILS NFR BLD: 66 %
NEUTS BAND NFR BLD: 5 %
NEUTS HYPERSEG # BLD: 8.66 X10(3) UL (ref 1.5–7.7)
OSMOLALITY SERPL CALC.SUM OF ELEC: 303 MOSM/KG (ref 275–295)
OSMOLALITY SERPL CALC.SUM OF ELEC: 304 MOSM/KG (ref 275–295)
PHOSPHATE SERPL-MCNC: 3.9 MG/DL (ref 2.4–5.1)
PLATELET # BLD AUTO: 169 10(3)UL (ref 150–450)
PLATELET MORPHOLOGY: NORMAL
POTASSIUM SERPL-SCNC: 4.6 MMOL/L (ref 3.5–5.1)
POTASSIUM SERPL-SCNC: 4.6 MMOL/L (ref 3.5–5.1)
PROCALCITONIN SERPL-MCNC: 0.65 NG/ML (ref ?–0.05)
RBC # BLD AUTO: 3.16 X10(6)UL
SODIUM SERPL-SCNC: 142 MMOL/L (ref 136–145)
SODIUM SERPL-SCNC: 143 MMOL/L (ref 136–145)
TOTAL CELLS COUNTED BLD: 100
WBC # BLD AUTO: 12.2 X10(3) UL (ref 4–11)

## 2024-11-30 PROCEDURE — 99291 CRITICAL CARE FIRST HOUR: CPT | Performed by: INTERNAL MEDICINE

## 2024-11-30 PROCEDURE — 99233 SBSQ HOSP IP/OBS HIGH 50: CPT | Performed by: INTERNAL MEDICINE

## 2024-11-30 PROCEDURE — 99233 SBSQ HOSP IP/OBS HIGH 50: CPT | Performed by: HOSPITALIST

## 2024-11-30 PROCEDURE — 71045 X-RAY EXAM CHEST 1 VIEW: CPT | Performed by: HOSPITALIST

## 2024-11-30 RX ORDER — FUROSEMIDE 10 MG/ML
40 INJECTION INTRAMUSCULAR; INTRAVENOUS ONCE
Status: COMPLETED | OUTPATIENT
Start: 2024-11-30 | End: 2024-11-30

## 2024-11-30 RX ORDER — CLONIDINE 0.1 MG/24H
1 PATCH, EXTENDED RELEASE TRANSDERMAL WEEKLY
Status: DISCONTINUED | OUTPATIENT
Start: 2024-11-30 | End: 2024-12-01

## 2024-11-30 RX ORDER — FUROSEMIDE 10 MG/ML
40 INJECTION INTRAMUSCULAR; INTRAVENOUS DAILY
Status: DISCONTINUED | OUTPATIENT
Start: 2024-12-01 | End: 2024-11-30

## 2024-11-30 RX ORDER — FUROSEMIDE 10 MG/ML
40 INJECTION INTRAMUSCULAR; INTRAVENOUS DAILY
Status: DISCONTINUED | OUTPATIENT
Start: 2024-12-01 | End: 2024-12-01

## 2024-11-30 RX ORDER — HYDRALAZINE HYDROCHLORIDE 50 MG/1
100 TABLET, FILM COATED ORAL EVERY 8 HOURS SCHEDULED
Status: DISCONTINUED | OUTPATIENT
Start: 2024-11-30 | End: 2024-12-01

## 2024-11-30 RX ORDER — HYDRALAZINE HYDROCHLORIDE 50 MG/1
50 TABLET, FILM COATED ORAL ONCE
Status: COMPLETED | OUTPATIENT
Start: 2024-11-30 | End: 2024-11-30

## 2024-11-30 RX ORDER — ISOSORBIDE DINITRATE 20 MG/1
20 TABLET ORAL
Status: DISCONTINUED | OUTPATIENT
Start: 2024-11-30 | End: 2024-12-03

## 2024-11-30 NOTE — PROGRESS NOTES
CV Surgery    Patient seen and examined  Labs and vital reviewed    15 minutes on SBT today, better.  Tongue swelling significantly improved    Keep trying SBT, extubate as able  If unable to extubate come Monday/Tuesday, will have to consider Trach/PEG.  Receiving Tube feeds for nutrition    Rafa Gregg MD  Cardiothoracic Surgery  [facility]

## 2024-11-30 NOTE — PROGRESS NOTES
[facility]  part of [facility]    Progress Note    Alisha Wilde Patient Status:  Inpatient    10/25/1965 MRN [id number]   Location [facility] 2W/SW Attending Hayde Brito MD   Hosp Day # 8 PCP Bridger Avendano MD     Chief Complaint:   Chief Complaint   Patient presents with    Chest Pain Angina       Subjective:   Alisha Wilde is intubated. Failed SBT this AM barely 20 min and pt was hypertensive, hypoxic in 80's with tachypnea.   Received lasix 1.8L Uop since then   Objective:   Objective:    Blood pressure 129/69, pulse 73, temperature 97.9 °F (36.6 °C), temperature source Temporal, resp. rate 19, height 5' 5\" (1.651 m), weight 268 lb 8.3 oz (121.8 kg), SpO2 96%, not currently breastfeeding.    Physical Exam:    General: No acute distress. Intubated.   Respiratory: Diminished bases   Cardiovascular: S1, S2. Regular rate and rhythm. No murmurs, rubs or gallops.   Abdomen: Soft, nontender, distended.  Positive bowel sounds. No rebound or guarding.  Extremities: 2+ pitting edema x 4 ext    Results:   Results:    Labs:  Recent Labs   Lab 24  0546 24  0847 24  0409 24  1307 24  0457   WBC 9.9 9.5 9.8 9.4 12.2*   HGB 8.5* 8.5* 7.8* 8.6* 8.8*   MCV 83.6 84.7 83.6 81.9 83.2   .0* 166.0 191.0 194.0 169.0   BAND  --   --   --   --  5       Recent Labs   Lab 24  0509 24  1746 24  0546 24  1821 24  0409 24  0846 24  1842 24  0457 24  0825   *   < > 124*   < > 131*  131*   < > 136* 157* 130*   BUN 29*   < > 25*   < > 26*  26*   < > 30* 30* 29*   CREATSERUM 1.13*   < > 1.12*   < > 1.45*  1.45*   < > 1.32* 1.41* 1.35*   CA 8.4*   < > 8.6*   < > 8.9  8.9   < > 9.1 9.5 9.2   ALB 3.5  --  3.5  --  3.6  --   --  3.5  --       < > 144   < > 143  143   < > 141 142 143   K 3.3*   < > 3.4*   < > 4.0  4.0   < > 4.6 4.6 4.6      < > 112   < > 111  111   < > 112 113* 114*   CO2 23.0   < >  22.0   < > 23.0  23.0   < > 19.0* 20.0* 21.0   ALKPHO 42*  --   --   --   --   --   --   --   --    AST 20  --   --   --   --   --   --   --   --    ALT <7*  --   --   --   --   --   --   --   --    BILT 0.4  --   --   --   --   --   --   --   --    TP 5.4*  --   --   --   --   --   --   --   --     < > = values in this interval not displayed.       Estimated Creatinine Clearance: 39.4 mL/min (A) (based on SCr of 1.35 mg/dL (H)).    No results for input(s): \"PTP\", \"INR\" in the last 168 hours.           Culture:  No results found for this visit on 11/21/24.    Cardiac  No results for input(s): \"TROP\", \"PBNP\" in the last 168 hours.        Imaging: Imaging data reviewed in Epic.  No results found.    Medications:    hydrALAZINE  100 mg Oral Q8H GABRIEL    isosorbide dinitrate  20 mg Per NG Tube TID (Nitrates)    cloNIDine  1 patch Transdermal Weekly    [START ON 12/1/2024] furosemide  40 mg Intravenous Daily    insulin regular human  1-5 Units Subcutaneous 4 times per day    carvedilol  6.25 mg Oral BID with meals    amLODIPine  10 mg Oral Daily    [Held by provider] losartan  50 mg Oral Daily    sodium chloride  3 mL Nebulization 4 times per day    albuterol  2.5 mg Nebulization 4 times per day    heparin  5,000 Units Subcutaneous Q8H GABRIEL    chlorhexidine gluconate  15 mL Mouth/Throat BID    piperacillin-tazobactam  3.375 g Intravenous Q8H    famotidine  20 mg Oral Daily    Or    famotidine  20 mg Intravenous Daily    aspirin  81 mg Oral Daily         Assessment and Plan:   Assessment & Plan:      Type A aortic dissection   S/p emergent repair 11/22/24   Trying to wean NTG gtt.   CV surgery, cards and critical care on consult.   TTE LVEF 75-80%.   Cont BP control.   Acute hypoxic respiratory failure   Aspiration event   IV zosyn day #9   Sputum cx candida   Failed SBT. Will need to consider trach and PEG Monday   Pulmonary on consult.   Cont lasix.   Albuterol nebs   H/o tobacco and ETOH abuse   Duonebs PRN   NAIMA on CKD  III   Renal on consult.   [place] to be secondary to MARY LOU/ATN   BUN/creat rending down.   Lasix IV resumed for volume overload   Essential HTN   Coreg 6.25mg BID, norvasc 10mg daily, hydralazine 100mg TID. Add clonidine patch   Cont Lasix 40mg IV daily   ARB on hold due to NAIMA.   Weaning off NTG gtt  Other medical problems  Morbid Obesity   TANYA     >55min spent, >50% spent counseling and coordinating care in the form of educating pt/family and d/w consultants and staff. Most of the time spent discussing the above plan.        Plan of care discussed with patient or family at bedside.    Hayde Brito MD  Hospitalist          Supplementary Documentation:     Quality:  DVT Prophylaxis: heparin   CODE status: Full  Dispo: per clinical course            Estimated date of discharge: TBD  Discharge is dependent on: clinical stability  At this point Ms. Wilde is expected to be discharge to: TBD

## 2024-11-30 NOTE — PROGRESS NOTES
[facility]  part of [facility]    Progress Note      Assessment and Plan:   1.  Dissection of ascending aorta.    Recommendations:  1.  As per CV surgery and cardiology status postrepair    2.  Respiratory failure-patient with bilateral infiltrates and aspiration phenomenon.  Also, may have a component of edema.  Failed spontaneous breathing trial today after 15 minutes.  The patient also has obstructive sleep apnea with 11.2 respiratory events per hour.    Recommendations:  1.  Daily spontaneous breathing trial  2.  If cannot extubate next week, would contemplate discussion regarding tracheostomy.  3.  Ongoing empiric antibiotic  4.  Lasix  5.  May need PAP therapy postextubation.    3.  Morbid obesity    4.  DVT prophylaxis-heparin    5.  CODE STATUS-full    6.  Hypertension-hydralazine added.    Subjective:   Alisha Wilde is a(n) 59 year old female who is sedated on ventilator    Objective:   Blood pressure 126/61, pulse 76, temperature 97.9 °F (36.6 °C), temperature source Temporal, resp. rate 25, height 5' 5\" (1.651 m), weight 268 lb 8.3 oz (121.8 kg), SpO2 97%, not currently breastfeeding.    Physical Exam sedate obese black female  HEENT examination is unremarkable with pupils equal round and reactive to light and accommodation.   Neck without adenopathy, thyromegaly, JVD nor bruit.   Lungs clear to auscultation and percussion.  Cardiac regular rate and rhythm no murmur.   Abdomen nontender, without hepatosplenomegaly and no mass appreciable.   Extremities without clubbing cyanosis nor edema.   Neurologic grossly intact with symmetric tone and strength and reflex.  Skin without gross abnormality     Results:     Lab Results   Component Value Date    WBC 12.2 11/30/2024    HGB 8.8 11/30/2024    HCT 26.3 11/30/2024    .0 11/30/2024    CREATSERUM 1.35 11/30/2024    BUN 29 11/30/2024     11/30/2024    K 4.6 11/30/2024     11/30/2024    CO2 21.0 11/30/2024      11/30/2024    CA 9.2 11/30/2024    ALB 3.5 11/30/2024    MG 2.3 11/30/2024    PHOS 3.9 11/30/2024       Fantasma Spicer MD  Medical Director, Critical Care, [facility]  Medical Director, [facility]  Pager: [phone]  >35 min crit care

## 2024-11-30 NOTE — PROGRESS NOTES
11/30/24 0229   Vent Information   Vent Mode VC+   Settings   FiO2 (%) 40 %   Resp Rate (Set) 18   Vt (Set, mL) 550 mL   PEEP/CPAP (cm H2O) 5 cm H20   ETT   Placement Date/Time: 11/22/24 1430   Airway Size: 7.5 mm  Cuffed: Cuffed  Insertion attempts: 2  Technique: Rapid sequence;Video laryngoscopy;Flexible bronchoscopy;Stylet  Placement Verification: Bronchoscopy;Capnometry;Colorimetric EtCO2 device  Plac...   Secured at (cm) 26 cm     Pt. vitals and SpO2 are stable, no acute changes. B.S ausculted. Suction is provided. RT will continue to monitor.

## 2024-11-30 NOTE — PROGRESS NOTES
[facility]  part of [facility]    Progress Note      Subjective:     Patient remained vent dependent.  Plan for trial of breathing later today.  Remain on TPN.  Family at bedside    Review of Systems:     Unable to obtain    Objective:   Temp:  [97.3 °F (36.3 °C)-98.1 °F (36.7 °C)] 97.4 °F (36.3 °C)  Pulse:  [65-80] 80  Resp:  [15-44] 41  BP: (114-150)/(57-82) 114/82  SpO2:  [86 %-100 %] 86 %  AO: ()/(52-90) 214/80  FiO2 (%):  [40 %] 40 %  SpO2: (!) 86 %     Intake/Output Summary (Last 24 hours) at 11/30/2024 1154  Last data filed at 11/30/2024 1145  Gross per 24 hour   Intake 4293.89 ml   Output 3670 ml   Net 623.89 ml     Wt Readings from Last 3 Encounters:   11/30/24 268 lb 8.3 oz (121.8 kg)   10/24/24 254 lb (115.2 kg)   05/17/24 249 lb (112.9 kg)       General appearance: Intubated and awake  Head: Normocephalic, atraumatic  Neck:  no JVD, supple, symmetrical  Skin: No rashes or lesions  Neurologic: unable to examine   Webb with clear urine    Medications:  Current Facility-Administered Medications   Medication Dose Route Frequency    hydrALAZINE (Apresoline) tab 100 mg  100 mg Oral Q8H GABRIEL    isosorbide dinitrate (Isordil) tab 20 mg  20 mg Per NG Tube TID (Nitrates)    insulin regular human (Novolin R, Humulin R) 100 UNIT/ML injection 1-5 Units  1-5 Units Subcutaneous 4 times per day    carvedilol (Coreg) tab 6.25 mg  6.25 mg Oral BID with meals    nitroGLYCERIN in dextrose 5% 50 mg/250mL infusion premix  5-400 mcg/min Intravenous Continuous    amLODIPine (Norvasc) tab 10 mg  10 mg Oral Daily    [Held by provider] losartan (Cozaar) tab 50 mg  50 mg Oral Daily    dextrose 10% infusion (TPN no rate)   Intravenous Continuous PRN    pancrelipase (Lip-Prot-Amyl) (Zenpep) DR particles cap 10,000 Units  10,000 Units Per G Tube PRN    And    sodium bicarbonate tab 325 mg  325 mg Oral PRN    sodium chloride 3 % nebulizer solution 3 mL  3 mL Nebulization 4 times per day     ipratropium-albuterol (Duoneb) 0.5-2.5 (3) MG/3ML inhalation solution 3 mL  3 mL Nebulization Q6H PRN    albuterol (Ventolin) (2.5 MG/3ML) 0.083% nebulizer solution 2.5 mg  2.5 mg Nebulization 4 times per day    HYDROcodone-acetaminophen (Norco) 5-325 MG per tab 2 tablet  2 tablet Oral Q4H PRN    heparin (Porcine) 5000 UNIT/ML injection 5,000 Units  5,000 Units Subcutaneous Q8H GABRIEL    melatonin tab 3 mg  3 mg Oral Nightly PRN    polyethylene glycol (PEG 3350) (Miralax) 17 g oral packet 17 g  17 g Oral Daily PRN    bisacodyl (Dulcolax) 10 MG rectal suppository 10 mg  10 mg Rectal Daily PRN    ondansetron (Zofran) 4 MG/2ML injection 4 mg  4 mg Intravenous Q6H PRN    dexmedeTOMIDine in sodium chloride 0.9% (Precedex) 400 mcg/100mL infusion premix  0.2-1.5 mcg/kg/hr (Dosing Weight) Intravenous Continuous    norepinephrine (Levophed) 4 mg/250mL infusion premix  0.5-30 mcg/min Intravenous Continuous PRN    potassium chloride 20 mEq/100mL IVPB premix 20 mEq  20 mEq Intravenous PRN    Or    potassium chloride 40 mEq/100mL IVPB premix (central line) 40 mEq  40 mEq Intravenous PRN    magnesium sulfate in dextrose 5% 1 g/100mL infusion premix 1 g  1 g Intravenous PRN    magnesium sulfate in sterile water for injection 2 g/50mL IVPB premix 2 g  2 g Intravenous PRN    chlorhexidine gluconate (Peridex) 0.12 % oral solution 15 mL  15 mL Mouth/Throat BID    morphINE PF 2 MG/ML injection 2 mg  2 mg Intravenous Q2H PRN    propofol (Diprivan) 10 mg/mL infusion premix  5-50 mcg/kg/min (Dosing Weight) Intravenous Continuous    fentaNYL (Sublimaze) 50 mcg/mL injection 25 mcg  25 mcg Intravenous Q3H PRN    piperacillin-tazobactam (Zosyn) 3.375 g in dextrose 5% 100 mL IVPB-ADDV  3.375 g Intravenous Q8H    famotidine (Pepcid) tab 20 mg  20 mg Oral Daily    Or    famotidine (Pepcid) 20 mg/2mL injection 20 mg  20 mg Intravenous Daily    aspirin chewable tab 81 mg  81 mg Oral Daily          Results:     Recent Labs   Lab 11/25/24  4444  11/26/24  0546 11/28/24  0409 11/28/24  1307 11/30/24  0457   RBC 3.12*   < > 2.81* 3.15* 3.16*   HGB 8.3*   < > 7.8* 8.6* 8.8*   HCT 26.4*   < > 23.5* 25.8* 26.3*   MCV 84.6   < > 83.6 81.9 83.2   NEPRELIM 7.00  --   --   --  7.59   WBC 9.4   < > 9.8 9.4 12.2*   .0*   < > 191.0 194.0 169.0    < > = values in this interval not displayed.     Recent Labs   Lab 11/25/24  0509 11/25/24  1746 11/26/24  0546 11/26/24  1821 11/28/24  0409 11/28/24  0846 11/29/24  1842 11/30/24  0457 11/30/24  0825   *   < > 124*   < > 131*  131*   < > 136* 157* 130*   BUN 29*   < > 25*   < > 26*  26*   < > 30* 30* 29*   CREATSERUM 1.13*   < > 1.12*   < > 1.45*  1.45*   < > 1.32* 1.41* 1.35*   CA 8.4*   < > 8.6*   < > 8.9  8.9   < > 9.1 9.5 9.2   ALB 3.5  --  3.5  --  3.6  --   --  3.5  --       < > 144   < > 143  143   < > 141 142 143   K 3.3*   < > 3.4*   < > 4.0  4.0   < > 4.6 4.6 4.6      < > 112   < > 111  111   < > 112 113* 114*   CO2 23.0   < > 22.0   < > 23.0  23.0   < > 19.0* 20.0* 21.0   ALKPHO 42*  --   --   --   --   --   --   --   --    AST 20  --   --   --   --   --   --   --   --    ALT <7*  --   --   --   --   --   --   --   --    BILT 0.4  --   --   --   --   --   --   --   --    TP 5.4*  --   --   --   --   --   --   --   --     < > = values in this interval not displayed.     PTT   Date Value Ref Range Status   11/22/2024 28.2 23.0 - 36.0 seconds Final     INR   Date Value Ref Range Status   11/22/2024 1.25 (H) 0.80 - 1.20 Final     Comment:     Therapeutic INR range for patients on warfarin:  2.0-3.0 for most medical conditions and surgical prophylaxis   2.5-3.5 for mechanical heart valves and recurrent thromboembolism    Direct thrombin inhibitors (e.g. argatroban, bivalirudin), factor Xa inhibitors (e.g. apixaban, rivaroxaban), and conditions such as coagulation factor deficiency, vitamin K deficiency, and liver disease will prolong the prothrombin time/INR.    Unfractionated heparin  and low molecular weight heparin do not affect the prothrombin time/INR up to a concentration of 1 IU/mL and 1.5 IU/mL, respectively.         No results for input(s): \"BNP\" in the last 168 hours.  Recent Labs   Lab 11/26/24  0546 11/28/24  0409 11/30/24  0457   MG 2.2 2.1 2.3   PHOS 3.3 3.6 3.9        Recent Labs   Lab 11/26/24  0546 11/28/24  0409 11/30/24  0457   PHOS 3.3 3.6 3.9   ALB 3.5 3.6 3.5       No results found.        Assessment and Plan:       1.NAIMA on CKD stage III: Nonoliguric on diuretic  NAIMA secondary to MARY LOU/ATN  creatinine stable at 1.1 mg/dL for last few days-creatinine touch better today at 1.36.  Will resume furosemide daily dose given high intake.  Continue to hold losartan.    on hydralazine and isosorbide. Metoprolol switched to carvedilol    2.aortic dissection: S/p emergent aortic dissection repair  CT surgery input noted  S/p CT chest abdomen pelvis-multifocal bilateral pulmonary nodular consolidation.  No focal fluid collection at surgery site.  S/p 1 unit of packed red blood transfusion    3.respiratory failure: Improve oxygen requirement and currently on 40% FiO2  Resume lasix at 40 mg IV daily dose - and titrate up if stable Cr  Patient not tolerating the trial of breathing-plan to continue trial over the weekend and if unable to then most likely will need tracheostomy and PEG tube placement after weekend    4 -renal cyst  The renal ultrasound showed a mildly complex cyst in the right kidney.  This will need to be followed up with surveillance ultrasound    Discussed with nursing and family at bedside.  Discussed with Dr.Poonja Robbi Resendiz MD

## 2024-11-30 NOTE — PROGRESS NOTES
[facility] - CARDIOLOGY PROGRESS NOTE      Alisha Wilde Patient Status:  Inpatient    10/25/1965 MRN [id number]   Location [facility] 2W/SW Attending Hayde Brito MD   Hosp Day # 8 PCP Bridger Avendano MD   presented with acute onset CP   -CT with type A aortic dissection above right coronary artery and extending distally into the left common iliac artery and external iliac   -s/p emergent successful repair on 24    Assessment and Plan:     Acute hypoxic respiratory failure, c/b aspiration event requiring re-intubation  Type A aortic dissection  NAIMA on CKD-III, improved  Obesity  Hypertension  EtOH abuse  -presented with acute onset CP   -CT with type A aortic dissection above right coronary artery and extending distally into the left common iliac artery and external iliac   -s/p emergent successful repair on 24  -had aspiration event following self-extubation, now re-intubated  -TTE on  with hyperdynamic LVEF  -continue BP management, currently on nitroglycerin gtt -> will wean                 -increase hydralazine to 50mg with isosorbide 20mg TID                  -switch metoprolol tartrate 25mg BID to coreg 6.25mg BID for additional BP control, uptitrate as tolerated per HR standpoint                 -continue amlodipine 10mg daily                 -ARB held by nephrology due to NAIMA/CKD  -volume management per nephrology, on lasix  -CIWA protocol  -monitor rhythm on tele  Tongue swelling significantly improved     Keep trying SBT, extubate as able  If unable to extubate come Monday/Tuesday, will have to consider Trach/PEG.  Receiving Tube feeds for nutrition     Plan:  Hydralazine from 50 to 100mg tid  Wean ntg ggt  Change isordil from tid to bid  Add clonidine patch 0.1    Subjective:   Alisha Wilde is a(n) 59 year old female with no new c/o today, intubated    Objective:   Blood pressure 114/82, pulse 80, temperature 97.4 °F (36.3 °C), temperature source  Temporal, resp. rate (!) 41, height 5' 5\" (1.651 m), weight 268 lb 8.3 oz (121.8 kg), SpO2 (!) 86%, not currently breastfeeding.    Exam  Gen: No acute distress, alert and oriented   Neck:supple,no JVD  Pulm: Lungs ok, normal respiratory effort  CV: Heart with regular rate and rhythm,no pathologic murmur  Abd: Abdomen soft, nontender, nondistended,  bowel sounds present  Ext:  no clubbing, no cyanosis  Neuro: no focal deficits  Skin: no rashes or lesions      Scheduled Meds:    hydrALAZINE  100 mg Oral Q8H GABRIEL    isosorbide dinitrate  20 mg Per NG Tube TID (Nitrates)    insulin regular human  1-5 Units Subcutaneous 4 times per day    carvedilol  6.25 mg Oral BID with meals    amLODIPine  10 mg Oral Daily    [Held by provider] losartan  50 mg Oral Daily    sodium chloride  3 mL Nebulization 4 times per day    albuterol  2.5 mg Nebulization 4 times per day    heparin  5,000 Units Subcutaneous Q8H GABRIEL    chlorhexidine gluconate  15 mL Mouth/Throat BID    piperacillin-tazobactam  3.375 g Intravenous Q8H    famotidine  20 mg Oral Daily    Or    famotidine  20 mg Intravenous Daily    aspirin  81 mg Oral Daily       Continuous Infusions:    nitroGLYCERIN in dextrose 5% 45 mcg/min (11/30/24 1005)    dextrose 10%      dexmedetomidine 1.3 mcg/kg/hr (11/30/24 1200)    norepinephrine Stopped (11/23/24 2225)    propofol 40 mcg/kg/min (11/30/24 1303)       Results:     Lab Results   Component Value Date    WBC 12.2 (H) 11/30/2024    HGB 8.8 (L) 11/30/2024    HCT 26.3 (L) 11/30/2024    .0 11/30/2024    CREATSERUM 1.35 (H) 11/30/2024    BUN 29 (H) 11/30/2024     11/30/2024    K 4.6 11/30/2024     (H) 11/30/2024    CO2 21.0 11/30/2024     (H) 11/30/2024    CA 9.2 11/30/2024    ALB 3.5 11/30/2024    ALKPHO 42 (L) 11/25/2024    BILT 0.4 11/25/2024    TP 5.4 (L) 11/25/2024    AST 20 11/25/2024    ALT <7 (L) 11/25/2024    PTT 28.2 11/22/2024    INR 1.25 (H) 11/22/2024    TSH 0.704 09/29/2024    NATHALIE 99  11/25/2024    LIP 28 11/25/2024    DDIMER 0.42 12/08/2019    ESRML 15 06/05/2021    MG 2.3 11/30/2024    PHOS 3.9 11/30/2024    TROP <0.045 12/08/2019     (H) 09/23/2021    B12 1,156 (H) 07/23/2018       No results found.        Imaging: I independently visualized all relevant chest imaging in PACS, agree with radiology interpretation except where noted.    Uriel Mejía MD  [facility]  Cardiac Electrophysiology  11/30/2024  [id number]    Critical care provider statement:     Critical care time (minutes):  45    Critical care time was exclusive of:  Separately billable procedures and treating other patients    Critical care was necessary to treat or prevent imminent or life-threatening deterioration of the following conditions:  Cardiac failure, HTN emergency, medication titration, intubated      Critical care was time spent personally by me on the following activities:  Development of treatment plan with patient or surrogate, discussions with consultants, discussions with primary provider, evaluation of patient's response to treatment, examination of patient, obtaining history from patient or surrogate, ordering and performing treatments and interventions, ordering and review of laboratory studies, ordering and review of radiographic studies, pulse oximetry and re-evaluation of patient's condition.

## 2024-11-30 NOTE — PLAN OF CARE
Restraints continued for patient safety.     Propofol and precedex gtt continued to manage vent settings; PRN fentatnyl for visible discomfort. NTG gtt weaned as tolerated to maintain hemodynamic stability. Tolerating tube feeds.  Frequent oral care; suctioning via yankauer and inline with thick secretions.       Problem: Safety Risk - Non-Violent Restraints  Goal: Patient will remain free from self-harm  Description: INTERVENTIONS:  - Apply the least restrictive restraint to prevent harm  - Notify patient and family of reasons restraints applied  - Assess for any contributing factors to confusion (electrolyte disturbances, delirium, medications)  - Discontinue any unnecessary medical devices as soon as possible  - Assess the patient's physical comfort, circulation, skin condition, hydration, nutrition and elimination needs   - Reorient and redirection as needed  - Assess for the need to continue restraints  11/30/2024 0013 by Jesse Lin, RN  Outcome: Not Progressing  11/29/2024 2224 by Jesse Lin, RN  Outcome: Not Progressing     Problem: RESPIRATORY - ADULT  Goal: Achieves optimal ventilation and oxygenation  Description: INTERVENTIONS:  - Assess for changes in respiratory status  - Assess for changes in mentation and behavior  - Position to facilitate oxygenation and minimize respiratory effort  - Oxygen supplementation based on oxygen saturation or ABGs  - Provide Smoking Cessation handout, if applicable  - Encourage broncho-pulmonary hygiene including cough, deep breathe, Incentive Spirometry  - Assess the need for suctioning and perform as needed  - Assess and instruct to report SOB or any respiratory difficulty  - Respiratory Therapy support as indicated  - Manage/alleviate anxiety  - Monitor for signs/symptoms of CO2 retention  Outcome: Progressing     Problem: NEUROLOGICAL - ADULT  Goal: Achieves stable or improved neurological status  Description: INTERVENTIONS  - Assess for and report changes in  neurological status  - Initiate measures to prevent increased intracranial pressure  - Maintain blood pressure and fluid volume within ordered parameters to optimize cerebral perfusion and minimize risk of hemorrhage  - Monitor temperature, glucose, and sodium. Initiate appropriate interventions as ordered  Outcome: Progressing     Problem: CARDIOVASCULAR - ADULT  Goal: Maintains optimal cardiac output and hemodynamic stability  Description: INTERVENTIONS:  - Monitor vital signs, rhythm, and trends  - Monitor for bleeding, hypotension and signs of decreased cardiac output  - Evaluate effectiveness of vasoactive medications to optimize hemodynamic stability  - Monitor arterial and/or venous puncture sites for bleeding and/or hematoma  - Assess quality of pulses, skin color and temperature  - Assess for signs of decreased coronary artery perfusion - ex. Angina  - Evaluate fluid balance, assess for edema, trend weights  Outcome: Progressing  Goal: Absence of cardiac arrhythmias or at baseline  Description: INTERVENTIONS:  - Continuous cardiac monitoring, monitor vital signs, obtain 12 lead EKG if indicated  - Evaluate effectiveness of antiarrhythmic and heart rate control medications as ordered  - Initiate emergency measures for life threatening arrhythmias  - Monitor electrolytes and administer replacement therapy as ordered  Outcome: Progressing

## 2024-12-01 ENCOUNTER — APPOINTMENT (OUTPATIENT)
Dept: GENERAL RADIOLOGY | Facility: HOSPITAL | Age: 59
DRG: 003 | End: 2024-12-01
Attending: INTERNAL MEDICINE
Payer: COMMERCIAL

## 2024-12-01 ENCOUNTER — APPOINTMENT (OUTPATIENT)
Dept: GENERAL RADIOLOGY | Facility: HOSPITAL | Age: 59
End: 2024-12-01
Attending: INTERNAL MEDICINE
Payer: COMMERCIAL

## 2024-12-01 LAB
ANION GAP SERPL CALC-SCNC: 9 MMOL/L (ref 0–18)
BASOPHILS # BLD: 0 X10(3) UL (ref 0–0.2)
BASOPHILS NFR BLD: 0 %
BUN BLD-MCNC: 31 MG/DL (ref 9–23)
BUN/CREAT SERPL: 22.3 (ref 10–20)
CALCIUM BLD-MCNC: 9 MG/DL (ref 8.7–10.4)
CHLORIDE SERPL-SCNC: 114 MMOL/L (ref 98–112)
CO2 SERPL-SCNC: 20 MMOL/L (ref 21–32)
CREAT BLD-MCNC: 1.39 MG/DL
DEPRECATED RDW RBC AUTO: 49.4 FL (ref 35.1–46.3)
EGFRCR SERPLBLD CKD-EPI 2021: 43 ML/MIN/1.73M2 (ref 60–?)
EOSINOPHIL # BLD: 0.36 X10(3) UL (ref 0–0.7)
EOSINOPHIL NFR BLD: 3 %
ERYTHROCYTE [DISTWIDTH] IN BLOOD BY AUTOMATED COUNT: 16.2 % (ref 11–15)
GLUCOSE BLD-MCNC: 143 MG/DL (ref 70–99)
GLUCOSE BLDC GLUCOMTR-MCNC: 145 MG/DL (ref 70–99)
GLUCOSE BLDC GLUCOMTR-MCNC: 153 MG/DL (ref 70–99)
GLUCOSE BLDC GLUCOMTR-MCNC: 168 MG/DL (ref 70–99)
HCT VFR BLD AUTO: 23 %
HGB BLD-MCNC: 7.7 G/DL
LYMPHOCYTES NFR BLD: 16 %
LYMPHOCYTES NFR BLD: 2.02 X10(3) UL (ref 1–4)
MCH RBC QN AUTO: 27.7 PG (ref 26–34)
MCHC RBC AUTO-ENTMCNC: 33.5 G/DL (ref 31–37)
MCV RBC AUTO: 82.7 FL
METAMYELOCYTES # BLD: 0.36 X10(3) UL
METAMYELOCYTES NFR BLD: 3 %
MONOCYTES # BLD: 0.36 X10(3) UL (ref 0.1–1)
MONOCYTES NFR BLD: 3 %
MORPHOLOGY: NORMAL
NEUTROPHILS # BLD AUTO: 7.4 X10 (3) UL (ref 1.5–7.7)
NEUTROPHILS NFR BLD: 71 %
NEUTS BAND NFR BLD: 3 %
NEUTS HYPERSEG # BLD: 8.81 X10(3) UL (ref 1.5–7.7)
OSMOLALITY SERPL CALC.SUM OF ELEC: 305 MOSM/KG (ref 275–295)
PLATELET # BLD AUTO: 168 10(3)UL (ref 150–450)
PLATELET MORPHOLOGY: NORMAL
POTASSIUM SERPL-SCNC: 4.1 MMOL/L (ref 3.5–5.1)
RBC # BLD AUTO: 2.78 X10(6)UL
SODIUM SERPL-SCNC: 143 MMOL/L (ref 136–145)
TOTAL CELLS COUNTED BLD: 100
VARIANT LYMPHS NFR BLD MANUAL: 1 %
WBC # BLD AUTO: 11.9 X10(3) UL (ref 4–11)

## 2024-12-01 PROCEDURE — 99233 SBSQ HOSP IP/OBS HIGH 50: CPT | Performed by: INTERNAL MEDICINE

## 2024-12-01 PROCEDURE — 99233 SBSQ HOSP IP/OBS HIGH 50: CPT | Performed by: HOSPITALIST

## 2024-12-01 PROCEDURE — 71045 X-RAY EXAM CHEST 1 VIEW: CPT | Performed by: INTERNAL MEDICINE

## 2024-12-01 RX ORDER — CLONIDINE 0.2 MG/24H
1 PATCH, EXTENDED RELEASE TRANSDERMAL WEEKLY
Status: DISCONTINUED | OUTPATIENT
Start: 2024-12-01 | End: 2024-12-05

## 2024-12-01 RX ORDER — FUROSEMIDE 10 MG/ML
40 INJECTION INTRAMUSCULAR; INTRAVENOUS
Status: DISCONTINUED | OUTPATIENT
Start: 2024-12-01 | End: 2024-12-01

## 2024-12-01 RX ORDER — HYDRALAZINE HYDROCHLORIDE 50 MG/1
150 TABLET, FILM COATED ORAL EVERY 8 HOURS SCHEDULED
Status: DISCONTINUED | OUTPATIENT
Start: 2024-12-01 | End: 2024-12-23

## 2024-12-01 RX ORDER — FUROSEMIDE 10 MG/ML
40 INJECTION INTRAMUSCULAR; INTRAVENOUS
Status: DISCONTINUED | OUTPATIENT
Start: 2024-12-01 | End: 2024-12-02

## 2024-12-01 RX ORDER — LABETALOL HYDROCHLORIDE 5 MG/ML
1 INJECTION, SOLUTION INTRAVENOUS ONCE
Status: COMPLETED | OUTPATIENT
Start: 2024-12-01 | End: 2024-12-01

## 2024-12-01 NOTE — PLAN OF CARE
Problem: Patient Centered Care  Goal: Patient preferences are identified and integrated in the patient's plan of care  Description: Interventions:  - What would you like us to know as we care for you? N/A- Pt orally intubated & sedated, unable to assess;  - Provide timely, complete, and accurate information to patient/family  - Incorporate patient and family knowledge, values, beliefs, and cultural backgrounds into the planning and delivery of care  - Encourage patient/family to participate in care and decision-making at the level they choose  - Honor patient and family perspectives and choices  Outcome: Progressing     Problem: Diabetes/Glucose Control  Goal: Glucose maintained within prescribed range  Description: INTERVENTIONS:  - Monitor Blood Glucose as ordered  - Assess for signs and symptoms of hyperglycemia and hypoglycemia  - Administer ordered medications to maintain glucose within target range  - Assess barriers to adequate nutritional intake and initiate nutrition consult as needed  - Instruct patient on self management of diabetes  Outcome: Progressing     Problem: CARDIOVASCULAR - ADULT  Goal: Maintains optimal cardiac output and hemodynamic stability  Description: INTERVENTIONS:  - Monitor vital signs, rhythm, and trends  - Monitor for bleeding, hypotension and signs of decreased cardiac output  - Evaluate effectiveness of vasoactive medications to optimize hemodynamic stability  - Monitor arterial and/or venous puncture sites for bleeding and/or hematoma  - Assess quality of pulses, skin color and temperature  - Assess for signs of decreased coronary artery perfusion - ex. Angina  - Evaluate fluid balance, assess for edema, trend weights  Outcome: Progressing  Goal: Absence of cardiac arrhythmias or at baseline  Description: INTERVENTIONS:  - Continuous cardiac monitoring, monitor vital signs, obtain 12 lead EKG if indicated  - Evaluate effectiveness of antiarrhythmic and heart rate control  medications as ordered  - Initiate emergency measures for life threatening arrhythmias  - Monitor electrolytes and administer replacement therapy as ordered  Outcome: Progressing     Problem: RESPIRATORY - ADULT  Goal: Achieves optimal ventilation and oxygenation  Description: INTERVENTIONS:  - Assess for changes in respiratory status  - Assess for changes in mentation and behavior  - Position to facilitate oxygenation and minimize respiratory effort  - Oxygen supplementation based on oxygen saturation or ABGs  - Provide Smoking Cessation handout, if applicable  - Encourage broncho-pulmonary hygiene including cough, deep breathe, Incentive Spirometry  - Assess the need for suctioning and perform as needed  - Assess and instruct to report SOB or any respiratory difficulty  - Respiratory Therapy support as indicated  - Manage/alleviate anxiety  - Monitor for signs/symptoms of CO2 retention  Outcome: Progressing   Problem: GASTROINTESTINAL - ADULT  Goal: Minimal or absence of nausea and vomiting  Description: INTERVENTIONS:  - Maintain adequate hydration with IV or PO as ordered and tolerated  - Nasogastric tube to low intermittent suction as ordered  - Evaluate effectiveness of ordered antiemetic medications  - Provide nonpharmacologic comfort measures as appropriate  - Advance diet as tolerated, if ordered  - Obtain nutritional consult as needed  - Evaluate fluid balance  Outcome: Progressing  Goal: Maintains or returns to baseline bowel function  Description: INTERVENTIONS:  - Assess bowel function  - Maintain adequate hydration with IV or PO as ordered and tolerated  - Evaluate effectiveness of GI medications  - Encourage mobilization and activity  - Obtain nutritional consult as needed  - Establish a toileting routine/schedule  - Consider collaborating with pharmacy to review patient's medication profile  Outcome: Progressing     Problem: METABOLIC/FLUID AND ELECTROLYTES - ADULT  Goal: Glucose maintained within  prescribed range  Description: INTERVENTIONS:  - Monitor Blood Glucose as ordered  - Assess for signs and symptoms of hyperglycemia and hypoglycemia  - Administer ordered medications to maintain glucose within target range  - Assess barriers to adequate nutritional intake and initiate nutrition consult as needed  - Instruct patient on self management of diabetes  Outcome: Progressing  Goal: Electrolytes maintained within normal limits  Description: INTERVENTIONS:  - Monitor labs and rhythm and assess patient for signs and symptoms of electrolyte imbalances  - Administer electrolyte replacement as ordered  - Monitor response to electrolyte replacements, including rhythm and repeat lab results as appropriate  - Fluid restriction as ordered  - Instruct patient on fluid and nutrition restrictions as appropriate  Outcome: Progressing  Goal: Hemodynamic stability and optimal renal function maintained  Description: INTERVENTIONS:  - Monitor labs and assess for signs and symptoms of volume excess or deficit  - Monitor intake, output and patient weight  - Monitor urine specific gravity, serum osmolarity and serum sodium as indicated or ordered  - Monitor response to interventions for patient's volume status, including labs, urine output, blood pressure (other measures as available)  - Encourage oral intake as appropriate  - Instruct patient on fluid and nutrition restrictions as appropriate  Outcome: Progressing     Problem: SKIN/TISSUE INTEGRITY - ADULT  Goal: Skin integrity remains intact  Description: INTERVENTIONS  - Assess and document risk factors for pressure ulcer development  - Assess and document skin integrity  - Monitor for areas of redness and/or skin breakdown  - Initiate interventions, skin care algorithm/standards of care as needed  Outcome: Progressing  Goal: Incision(s), wounds(s) or drain site(s) healing without S/S of infection  Description: INTERVENTIONS:  - Assess and document risk factors for pressure  ulcer development  - Assess and document skin integrity  - Assess and document dressing/incision, wound bed, drain sites and surrounding tissue  - Implement wound care per orders  - Initiate isolation precautions as appropriate  - Initiate Pressure Ulcer prevention bundle as indicated  Outcome: Progressing  Goal: Oral mucous membranes remain intact  Description: INTERVENTIONS  - Assess oral mucosa and hygiene practices  - Implement preventative oral hygiene regimen  - Implement oral medicated treatments as ordered  Outcome: Progressing     Problem: HEMATOLOGIC - ADULT  Goal: Maintains hematologic stability  Description: INTERVENTIONS  - Assess for signs and symptoms of bleeding or hemorrhage  - Monitor labs and vital signs for trends  - Administer supportive blood products/factors, fluids and medications as ordered and appropriate  - Administer supportive blood products/factors as ordered and appropriate  Outcome: Progressing  Goal: Free from bleeding injury  Description: (Example usage: patient with low platelets)  INTERVENTIONS:  - Avoid intramuscular injections, enemas and rectal medication administration  - Ensure safe mobilization of patient  - Hold pressure on venipuncture sites to achieve adequate hemostasis  - Assess for signs and symptoms of internal bleeding  - Monitor lab trends  - Patient is to report abnormal signs of bleeding to staff  - Avoid use of toothpicks and dental floss  - Use electric shaver for shaving  - Use soft bristle tooth brush  - Limit straining and forceful nose blowing  Outcome: Progressing     Problem: MUSCULOSKELETAL - ADULT  Goal: Return mobility to safest level of function  Description: INTERVENTIONS:  - Assess patient stability and activity tolerance for standing, transferring and ambulating w/ or w/o assistive devices  - Assist with transfers and ambulation using safe patient handling equipment as needed  - Ensure adequate protection for wounds/incisions during mobilization  -  Obtain PT/OT consults as needed  - Advance activity as appropriate  - Communicate ordered activity level and limitations with patient/family  Outcome: Progressing  Goal: Maintain proper alignment of affected body part  Description: INTERVENTIONS:  - Support and protect limb and body alignment per provider's orders  - Instruct and reinforce with patient and family use of appropriate assistive device and precautions (e.g. spinal or hip dislocation precautions)  Outcome: Progressing     Problem: NEUROLOGICAL - ADULT  Goal: Achieves stable or improved neurological status  Description: INTERVENTIONS  - Assess for and report changes in neurological status  - Initiate measures to prevent increased intracranial pressure  - Maintain blood pressure and fluid volume within ordered parameters to optimize cerebral perfusion and minimize risk of hemorrhage  - Monitor temperature, glucose, and sodium. Initiate appropriate interventions as ordered  Outcome: Progressing     Problem: Safety Risk - Non-Violent Restraints  Goal: Patient will remain free from self-harm  Description: INTERVENTIONS:  - Apply the least restrictive restraint to prevent harm  - Notify patient and family of reasons restraints applied  - Assess for any contributing factors to confusion (electrolyte disturbances, delirium, medications)  - Discontinue any unnecessary medical devices as soon as possible  - Assess the patient's physical comfort, circulation, skin condition, hydration, nutrition and elimination needs   - Reorient and redirection as needed  - Assess for the need to continue restraints  Outcome: Progressing    Christy Pierce, RN, BSN, CCRN

## 2024-12-01 NOTE — PROGRESS NOTES
[facility]  part of [facility]    Progress Note      Subjective:     Failed spontaneous breathing trial again today.  Currently sedated and intubated.  On nitroglycerin drip    Review of Systems:     Unable to obtain    Objective:   Temp:  [97.7 °F (36.5 °C)-99.4 °F (37.4 °C)] 97.8 °F (36.6 °C)  Pulse:  [60-81] 74  Resp:  [8-42] 21  BP: (110-184)/(60-75) 142/69  SpO2:  [91 %-100 %] 98 %  AO: (127-234)/() 162/59  FiO2 (%):  [40 %] 40 %  SpO2: 98 %     Intake/Output Summary (Last 24 hours) at 12/1/2024 1131  Last data filed at 12/1/2024 1100  Gross per 24 hour   Intake 3947.27 ml   Output 3400 ml   Net 547.27 ml     Wt Readings from Last 3 Encounters:   12/01/24 288 lb 5.8 oz (130.8 kg)   10/24/24 254 lb (115.2 kg)   05/17/24 249 lb (112.9 kg)       General appearance: Intubated and sedated  Head: Normocephalic, atraumatic  Neck:  no JVD, supple, symmetrical  Skin: No rashes or lesions  Neurologic: unable to examine   Webb with clear urine    Medications:  Current Facility-Administered Medications   Medication Dose Route Frequency    furosemide (Lasix) 10 mg/mL injection 40 mg  40 mg Intravenous BID (Diuretic)    labetalol (Trandate) 5 mg/mL injection  1 mg/min Intravenous Once    hydrALAZINE (Apresoline) tab 150 mg  150 mg Oral Q8H GABRIEL    cloNIDine (Catapres) 0.2 MG/24HR patch 1 patch  1 patch Transdermal Weekly    isosorbide dinitrate (Isordil) tab 20 mg  20 mg Per NG Tube TID (Nitrates)    insulin regular human (Novolin R, Humulin R) 100 UNIT/ML injection 1-5 Units  1-5 Units Subcutaneous 4 times per day    carvedilol (Coreg) tab 6.25 mg  6.25 mg Oral BID with meals    amLODIPine (Norvasc) tab 10 mg  10 mg Oral Daily    [Held by provider] losartan (Cozaar) tab 50 mg  50 mg Oral Daily    dextrose 10% infusion (TPN no rate)   Intravenous Continuous PRN    pancrelipase (Lip-Prot-Amyl) (Zenpep) DR particles cap 10,000 Units  10,000 Units Per G Tube PRN    And    sodium bicarbonate tab 325  mg  325 mg Oral PRN    sodium chloride 3 % nebulizer solution 3 mL  3 mL Nebulization 4 times per day    ipratropium-albuterol (Duoneb) 0.5-2.5 (3) MG/3ML inhalation solution 3 mL  3 mL Nebulization Q6H PRN    albuterol (Ventolin) (2.5 MG/3ML) 0.083% nebulizer solution 2.5 mg  2.5 mg Nebulization 4 times per day    HYDROcodone-acetaminophen (Norco) 5-325 MG per tab 2 tablet  2 tablet Oral Q4H PRN    heparin (Porcine) 5000 UNIT/ML injection 5,000 Units  5,000 Units Subcutaneous Q8H GABRIEL    melatonin tab 3 mg  3 mg Oral Nightly PRN    polyethylene glycol (PEG 3350) (Miralax) 17 g oral packet 17 g  17 g Oral Daily PRN    bisacodyl (Dulcolax) 10 MG rectal suppository 10 mg  10 mg Rectal Daily PRN    ondansetron (Zofran) 4 MG/2ML injection 4 mg  4 mg Intravenous Q6H PRN    dexmedeTOMIDine in sodium chloride 0.9% (Precedex) 400 mcg/100mL infusion premix  0.2-1.5 mcg/kg/hr (Dosing Weight) Intravenous Continuous    norepinephrine (Levophed) 4 mg/250mL infusion premix  0.5-30 mcg/min Intravenous Continuous PRN    potassium chloride 20 mEq/100mL IVPB premix 20 mEq  20 mEq Intravenous PRN    Or    potassium chloride 40 mEq/100mL IVPB premix (central line) 40 mEq  40 mEq Intravenous PRN    magnesium sulfate in dextrose 5% 1 g/100mL infusion premix 1 g  1 g Intravenous PRN    magnesium sulfate in sterile water for injection 2 g/50mL IVPB premix 2 g  2 g Intravenous PRN    chlorhexidine gluconate (Peridex) 0.12 % oral solution 15 mL  15 mL Mouth/Throat BID    morphINE PF 2 MG/ML injection 2 mg  2 mg Intravenous Q2H PRN    propofol (Diprivan) 10 mg/mL infusion premix  5-50 mcg/kg/min (Dosing Weight) Intravenous Continuous    fentaNYL (Sublimaze) 50 mcg/mL injection 25 mcg  25 mcg Intravenous Q3H PRN    piperacillin-tazobactam (Zosyn) 3.375 g in dextrose 5% 100 mL IVPB-ADDV  3.375 g Intravenous Q8H    famotidine (Pepcid) tab 20 mg  20 mg Oral Daily    Or    famotidine (Pepcid) 20 mg/2mL injection 20 mg  20 mg Intravenous Daily     aspirin chewable tab 81 mg  81 mg Oral Daily          Results:     Recent Labs   Lab 11/25/24  0509 11/26/24  0546 11/28/24  1307 11/30/24  0457 12/01/24  0510   RBC 3.12*   < > 3.15* 3.16* 2.78*   HGB 8.3*   < > 8.6* 8.8* 7.7*   HCT 26.4*   < > 25.8* 26.3* 23.0*   MCV 84.6   < > 81.9 83.2 82.7   NEPRELIM 7.00  --   --  7.59 7.40   WBC 9.4   < > 9.4 12.2* 11.9*   .0*   < > 194.0 169.0 168.0    < > = values in this interval not displayed.     Recent Labs   Lab 11/25/24  0509 11/25/24  1746 11/26/24  0546 11/26/24  1821 11/28/24  0409 11/28/24  0846 11/30/24  0457 11/30/24  0825 12/01/24  0510   *   < > 124*   < > 131*  131*   < > 157* 130* 143*   BUN 29*   < > 25*   < > 26*  26*   < > 30* 29* 31*   CREATSERUM 1.13*   < > 1.12*   < > 1.45*  1.45*   < > 1.41* 1.35* 1.39*   CA 8.4*   < > 8.6*   < > 8.9  8.9   < > 9.5 9.2 9.0   ALB 3.5  --  3.5  --  3.6  --  3.5  --   --       < > 144   < > 143  143   < > 142 143 143   K 3.3*   < > 3.4*   < > 4.0  4.0   < > 4.6 4.6 4.1      < > 112   < > 111  111   < > 113* 114* 114*   CO2 23.0   < > 22.0   < > 23.0  23.0   < > 20.0* 21.0 20.0*   ALKPHO 42*  --   --   --   --   --   --   --   --    AST 20  --   --   --   --   --   --   --   --    ALT <7*  --   --   --   --   --   --   --   --    BILT 0.4  --   --   --   --   --   --   --   --    TP 5.4*  --   --   --   --   --   --   --   --     < > = values in this interval not displayed.     PTT   Date Value Ref Range Status   11/22/2024 28.2 23.0 - 36.0 seconds Final     INR   Date Value Ref Range Status   11/22/2024 1.25 (H) 0.80 - 1.20 Final     Comment:     Therapeutic INR range for patients on warfarin:  2.0-3.0 for most medical conditions and surgical prophylaxis   2.5-3.5 for mechanical heart valves and recurrent thromboembolism    Direct thrombin inhibitors (e.g. argatroban, bivalirudin), factor Xa inhibitors (e.g. apixaban, rivaroxaban), and conditions such as coagulation factor deficiency,  vitamin K deficiency, and liver disease will prolong the prothrombin time/INR.    Unfractionated heparin and low molecular weight heparin do not affect the prothrombin time/INR up to a concentration of 1 IU/mL and 1.5 IU/mL, respectively.         No results for input(s): \"BNP\" in the last 168 hours.  Recent Labs   Lab 11/26/24  0546 11/28/24  0409 11/30/24  0457   MG 2.2 2.1 2.3   PHOS 3.3 3.6 3.9        Recent Labs   Lab 11/26/24  0546 11/28/24  0409 11/30/24  0457   PHOS 3.3 3.6 3.9   ALB 3.5 3.6 3.5       XR CHEST AP PORTABLE  (CPT=71045)    Result Date: 12/1/2024  CONCLUSION:  1. Multifocal patchy opacities throughout the right greater than left lungs are similar in comparison to previous day chest radiograph. 2. Stable radiographic appearance of support apparatus.    Dictated by (CST): Evaristo Rosado MD on 12/01/2024 at 9:04 AM     Finalized by (CST): Evaristo Rosado MD on 12/01/2024 at 9:06 AM               Assessment and Plan:       1.NAIMA on CKD stage III: Nonoliguric on diuretic  NAIMA secondary to MARY LOU/ATN  creatinine stable at 1.1 mg/dL for last few days-creatinine touch better today at 1.36.  Will resume furosemide daily dose given high intake.  Continue to hold losartan.    on hydralazine and isosorbide. Metoprolol switched to carvedilol    2.aortic dissection: S/p emergent aortic dissection repair  CT surgery input noted  S/p CT chest abdomen pelvis-multifocal bilateral pulmonary nodular consolidation.  No focal fluid collection at surgery site.  S/p 1 unit of packed red blood transfusion-drop in hemoglobin again noted    3.respiratory failure: Improve oxygen requirement and currently on 40% FiO2  Resume lasix at 40 mg IV-dose increased to twice daily  Patient not tolerating the trial of breathing-plan to continue trial over the weekend and if unable to then most likely will need tracheostomy and PEG tube placement after weekend    4 -renal cyst  The renal ultrasound showed a mildly complex cyst in the  right kidney.  This will need to be followed up with surveillance ultrasound    Discussed with nursing     Robbi Resendiz MD

## 2024-12-01 NOTE — PROGRESS NOTES
[facility] - CARDIOLOGY PROGRESS NOTE      Alisha Wilde Patient Status:  Inpatient    10/25/1965 MRN [id number]   Location [facility] 2W/SW Attending Hayde Brito MD   Hosp Day # 9 PCP Bridger Avendano MD     presented with acute onset CP   -CT with type A aortic dissection above right coronary artery and extending distally into the left common iliac artery and external iliac   -s/p emergent successful repair on 24     Assessment and Plan:      Acute hypoxic respiratory failure, c/b aspiration event requiring re-intubation  Type A aortic dissection  NAIMA on CKD-III, improved  Obesity  Hypertension  EtOH abuse  -presented with acute onset CP   -CT with type A aortic dissection above right coronary artery and extending distally into the left common iliac artery and external iliac   -s/p emergent successful repair on 24  -had aspiration event following self-extubation, now re-intubated  -TTE on  with hyperdynamic LVEF  -continue BP management, currently on nitroglycerin gtt -> will wean                 -increase hydralazine to 50mg with isosorbide 20mg TID                  -switch metoprolol tartrate 25mg BID to coreg 6.25mg BID for additional BP control, uptitrate as tolerated per HR standpoint                 -continue amlodipine 10mg daily                 -ARB held by nephrology due to NAIMA/CKD  -volume management per nephrology, on lasix  -CIWA protocol  -monitor rhythm on tele  Tongue swelling significantly improved     Keep trying SBT, extubate as able  If unable to extubate come Monday/Tuesday, will have to consider Trach/PEG.  Receiving Tube feeds for nutrition     Plan:  Hydralazine from 100 mg tid to 150mg  Wean ntg ggt - start labetalol ggt  Cont isordil bid  Add another clonidine patch 0.1; totalol 0.2    Subjective:   Alisha Wilde is a(n) 59 year old female with intubated    Objective:   Blood pressure 142/69, pulse 74, temperature 97.8 °F (36.6 °C),  temperature source Temporal, resp. rate 21, height 5' 5\" (1.651 m), weight 288 lb 5.8 oz (130.8 kg), SpO2 98%, not currently breastfeeding.    Exam  Gen: No acute distress, alert and oriented   Neck:supple,no JVD  Pulm: Lungs ok, normal respiratory effort  CV: Heart with regular rate and rhythm,no pathologic murmur  Abd: Abdomen soft, nontender, nondistended,  bowel sounds present  Ext:  no clubbing, no cyanosis  Neuro: no focal deficits  Skin: no rashes or lesions      Scheduled Meds:    furosemide  40 mg Intravenous BID (Diuretic)    labetalol  1 mg/min Intravenous Once    hydrALAZINE  150 mg Oral Q8H [facility]    cloNIDine  2 patch Transdermal Weekly    isosorbide dinitrate  20 mg Per NG Tube TID (Nitrates)    insulin regular human  1-5 Units Subcutaneous 4 times per day    carvedilol  6.25 mg Oral BID with meals    amLODIPine  10 mg Oral Daily    [Held by provider] losartan  50 mg Oral Daily    sodium chloride  3 mL Nebulization 4 times per day    albuterol  2.5 mg Nebulization 4 times per day    heparin  5,000 Units Subcutaneous Q8H [facility]    chlorhexidine gluconate  15 mL Mouth/Throat BID    piperacillin-tazobactam  3.375 g Intravenous Q8H    famotidine  20 mg Oral Daily    Or    famotidine  20 mg Intravenous Daily    aspirin  81 mg Oral Daily       Continuous Infusions:    dextrose 10%      dexmedetomidine 1.2 mcg/kg/hr (12/01/24 0935)    norepinephrine Stopped (11/23/24 2225)    propofol 50 mcg/kg/min (12/01/24 0935)       Results:     Lab Results   Component Value Date    WBC 11.9 (H) 12/01/2024    HGB 7.7 (L) 12/01/2024    HCT 23.0 (L) 12/01/2024    .0 12/01/2024    CREATSERUM 1.39 (H) 12/01/2024    BUN 31 (H) 12/01/2024     12/01/2024    K 4.1 12/01/2024     (H) 12/01/2024    CO2 20.0 (L) 12/01/2024     (H) 12/01/2024    CA 9.0 12/01/2024    ALB 3.5 11/30/2024    ALKPHO 42 (L) 11/25/2024    BILT 0.4 11/25/2024    TP 5.4 (L) 11/25/2024    AST 20 11/25/2024    ALT <7 (L) 11/25/2024    PTT  28.2 11/22/2024    INR 1.25 (H) 11/22/2024    TSH 0.704 09/29/2024    NATHALIE 99 11/25/2024    LIP 28 11/25/2024    DDIMER 0.42 12/08/2019    ESRML 15 06/05/2021    MG 2.3 11/30/2024    PHOS 3.9 11/30/2024    TROP <0.045 12/08/2019     (H) 09/23/2021    B12 1,156 (H) 07/23/2018       XR CHEST AP PORTABLE  (CPT=71045)    Result Date: 12/1/2024  CONCLUSION:  1. Multifocal patchy opacities throughout the right greater than left lungs are similar in comparison to previous day chest radiograph. 2. Stable radiographic appearance of support apparatus.    Dictated by (CST): Evaristo Rosado MD on 12/01/2024 at 9:04 AM     Finalized by (CST): Evaristo Rosado MD on 12/01/2024 at 9:06 AM               Imaging: I independently visualized all relevant chest imaging in PACS, agree with radiology interpretation except where noted.    Uriel Mejía MD  [facility]  Cardiac Electrophysiology  12/1/2024  [id number]  Critical care provider statement:     Critical care time (minutes):  3    Critical care time was exclusive of:  Separately billable procedures and treating other patients    Critical care was necessary to treat or prevent imminent or life-threatening deterioration of the following conditions:  Cardiac HTN emergency    Critical care was time spent personally by me on the following activities:  Development of treatment plan with patient or surrogate, discussions with consultants, discussions with primary provider, evaluation of patient's response to treatment, examination of patient, obtaining history from patient or surrogate, ordering and performing treatments and interventions, ordering and review of laboratory studies, ordering and review of radiographic studies, pulse oximetry and re-evaluation of patient's condition.

## 2024-12-01 NOTE — RESPIRATORY THERAPY NOTE
Pt received last night at 2300 on VC+ ventilation, rate 18, Vt 550, PEEP +5, FiO2 40% tolerating well. ETT 7.5, 26 cm at the lips. Scheduled neb tx given, bilat   BS auscultated, pt suctioned as needed with moderate to large amounts of thick tan secretions. No changes made to the vent overnight. RT will continue to monitor.

## 2024-12-01 NOTE — PROGRESS NOTES
Received pt on full vent support. Pt later placed on SBT 5/5, 40%. Pt displayed the following on SBT:   11/30/24 1106 11/30/24 1110 11/30/24 1117   Spontaneous Parameters   Spontaneous RR Rate 37 33 41   Spontaneous Minute Volume 14.8  --   --    Average Spontaneous Tidal Volume 377 334 393   $ Spontaneous Vital Capacity 0.448  --   --    Negative Inspiratory Force -37  --   --    Total RSBI 81 88 107     Pt did not tolerate SBT and became hypoxemic, hypertensive (systolic into 200s), tachypneic, and had increased WOB after approx 15 mins. Pt was then switched back to full vent support. Nebs given as scheduled. Large amount of secretions suctioned throughout shift. Switched to heated/humidified vent circuit d/t prolonged mechanical ventilation and thick secretions. RT to continue to monitor.

## 2024-12-01 NOTE — PROGRESS NOTES
[facility]  part of [facility]    Progress Note      Assessment and Plan:   1.  Dissection of ascending aorta.    Recommendations:  1.  As per CV surgery and cardiology status postrepair    2.  Respiratory failure-patient with bilateral infiltrates and aspiration phenomenon.  Also, may have a component of edema.  Failed spontaneous breathing trial again today after 15 minutes.  The patient also has obstructive sleep apnea with 11.2 respiratory events per hour.  Received some Lasix yesterday but poor diuresis.  Will give an extra dose of Lasix this afternoon.    Recommendations:  1.  Daily spontaneous breathing trial  2.  If cannot extubate next week, would contemplate discussion regarding tracheostomy.  3.  Ongoing empiric antibiotic  4.  Lasix  5.  May need PAP therapy postextubation.  6.  Morning chest x-ray    3.  Morbid obesity    4.  DVT prophylaxis-heparin    5.  CODE STATUS-full    6.  Hypertension-hydralazine added.    Subjective:   Alisha Wilde is a(n) 59 year old female who is sedated on ventilator, but does arouse to stimulation and moves extremities.    Objective:   Blood pressure 141/74, pulse 69, temperature 97.6 °F (36.4 °C), temperature source Temporal, resp. rate 20, height 5' 5\" (1.651 m), weight 288 lb 5.8 oz (130.8 kg), SpO2 99%, not currently breastfeeding.    Physical Exam sedate obese black female  HEENT examination is unremarkable with pupils equal round and reactive to light and accommodation.   Neck without adenopathy, thyromegaly, JVD nor bruit.   Lungs diminished to auscultation and percussion.  Cardiac regular rate and rhythm no murmur.   Abdomen nontender, without hepatosplenomegaly and no mass appreciable.   Extremities without clubbing cyanosis nor edema.   Neurologic sedate on ventilator.  Skin without gross abnormality     Results:     Lab Results   Component Value Date    WBC 11.9 12/01/2024    HGB 7.7 12/01/2024    HCT 23.0 12/01/2024    .0 12/01/2024     CREATSERUM 1.39 12/01/2024    BUN 31 12/01/2024     12/01/2024    K 4.1 12/01/2024     12/01/2024    CO2 20.0 12/01/2024     12/01/2024    CA 9.0 12/01/2024       Fantasma Spicer MD  Medical Director, Critical Care, [facility]  Medical Director, [facility]  Pager: [phone]  >35 min crit care

## 2024-12-01 NOTE — PROGRESS NOTES
[facility]  part of [facility]    Progress Note    Alisha Wilde Patient Status:  Inpatient    10/25/1965 MRN [id number]   Location [facility] 2W/SW Attending Hayde Brito MD   Hosp Day # 9 PCP Bridger Avendano MD     Chief Complaint:   Chief Complaint   Patient presents with    Chest Pain Angina       Subjective:   Alisha Wilde is intubated and sedated. Didn't tolerate SBT today   Objective:   Objective:    Blood pressure 147/75, pulse 68, temperature 97.6 °F (36.4 °C), temperature source Temporal, resp. rate 18, height 5' 5\" (1.651 m), weight 288 lb 5.8 oz (130.8 kg), SpO2 97%, not currently breastfeeding.    Physical Exam:    General: No acute distress. Intubated.   Respiratory: Diminished bases   Cardiovascular: S1, S2. Regular rate and rhythm. No murmurs, rubs or gallops.   Abdomen: Soft, nontender, distended.  Positive bowel sounds. No rebound or guarding.  Extremities: 2+ pitting edema x 4 ext    Results:   Results:    Labs:  Recent Labs   Lab 24  0847 24  0409 24  1307 24  0457 24  0510   WBC 9.5 9.8 9.4 12.2* 11.9*   HGB 8.5* 7.8* 8.6* 8.8* 7.7*   MCV 84.7 83.6 81.9 83.2 82.7   .0 191.0 194.0 169.0 168.0   BAND  --   --   --  5 3       Recent Labs   Lab 24  0509 24  1746 24  0546 24  1821 24  0409 24  0846 24  0457 24  0825 24  0510   *   < > 124*   < > 131*  131*   < > 157* 130* 143*   BUN 29*   < > 25*   < > 26*  26*   < > 30* 29* 31*   CREATSERUM 1.13*   < > 1.12*   < > 1.45*  1.45*   < > 1.41* 1.35* 1.39*   CA 8.4*   < > 8.6*   < > 8.9  8.9   < > 9.5 9.2 9.0   ALB 3.5  --  3.5  --  3.6  --  3.5  --   --       < > 144   < > 143  143   < > 142 143 143   K 3.3*   < > 3.4*   < > 4.0  4.0   < > 4.6 4.6 4.1      < > 112   < > 111  111   < > 113* 114* 114*   CO2 23.0   < > 22.0   < > 23.0  23.0   < > 20.0* 21.0 20.0*   ALKPHO 42*  --   --   --   --   --   --    --   --    AST 20  --   --   --   --   --   --   --   --    ALT <7*  --   --   --   --   --   --   --   --    BILT 0.4  --   --   --   --   --   --   --   --    TP 5.4*  --   --   --   --   --   --   --   --     < > = values in this interval not displayed.       Estimated Creatinine Clearance: 38.2 mL/min (A) (based on SCr of 1.39 mg/dL (H)).    No results for input(s): \"PTP\", \"INR\" in the last 168 hours.           Culture:  No results found for this visit on 11/21/24.    Cardiac  No results for input(s): \"TROP\", \"PBNP\" in the last 168 hours.        Imaging: Imaging data reviewed in [facility].  XR CHEST AP PORTABLE  (CPT=71045)    Result Date: 12/1/2024  CONCLUSION:  1. Multifocal patchy opacities throughout the right greater than left lungs are similar in comparison to previous day chest radiograph. 2. Stable radiographic appearance of support apparatus.    Dictated by (CST): Evaristo Rosado MD on 12/01/2024 at 9:04 AM     Finalized by (CST): Evaristo Rosado MD on 12/01/2024 at 9:06 AM           Medications:    furosemide  40 mg Intravenous BID (Diuretic)    hydrALAZINE  150 mg Oral Q8H [facility]    cloNIDine  1 patch Transdermal Weekly    isosorbide dinitrate  20 mg Per NG Tube TID (Nitrates)    insulin regular human  1-5 Units Subcutaneous 4 times per day    carvedilol  6.25 mg Oral BID with meals    amLODIPine  10 mg Oral Daily    [Held by provider] losartan  50 mg Oral Daily    sodium chloride  3 mL Nebulization 4 times per day    albuterol  2.5 mg Nebulization 4 times per day    heparin  5,000 Units Subcutaneous Q8H [facility]    chlorhexidine gluconate  15 mL Mouth/Throat BID    piperacillin-tazobactam  3.375 g Intravenous Q8H    famotidine  20 mg Oral Daily    Or    famotidine  20 mg Intravenous Daily    aspirin  81 mg Oral Daily         Assessment and Plan:   Assessment & Plan:      Type A aortic dissection   S/p emergent repair 11/22/24   Off NTG gtt now on labetalol gtt  CV surgery, cards and critical care on consult.    TTE LVEF 75-80%.   Cont BP control.   Acute hypoxic respiratory failure   Aspiration event   IV zosyn day #10  Sputum cx candida   Failed SBT. Will need to consider trach and PEG Monday   Pulmonary on consult.   Cont lasix increased to BID  Albuterol nebs   H/o tobacco and ETOH abuse   Duonebs PRN   NAIMA on CKD III   Renal on consult.   [place] to be secondary to MARY LOU/ATN   BUN/creat rending down.   Essential HTN   Coreg 6.25mg BID, norvasc 10mg daily, hydralazine 100mg TID. Added another clonidine patch 0.2mg daily   Cont Lasix 40mg IV BID  ARB on hold due to NAIMA.   Off NTG gtt. Now on labetalol gtt.   Other medical problems  Morbid Obesity   TANYA     >55min spent, >50% spent counseling and coordinating care in the form of educating pt/family and d/w consultants and staff. Most of the time spent discussing the above plan.        Plan of care discussed with patient or family at bedside.    Hayde Brito MD  Hospitalist          Supplementary Documentation:     Quality:  DVT Prophylaxis: heparin   CODE status: Full  Dispo: per clinical course            Estimated date of discharge: TBD  Discharge is dependent on: clinical stability  At this point Ms. Wilde is expected to be discharge to: TBD

## 2024-12-01 NOTE — PLAN OF CARE
Close friend of pt visited overnight. No acute changes noted. Tolerating vent, complying to commands even while on sedation.    Problem: Patient Centered Care  Goal: Patient preferences are identified and integrated in the patient's plan of care  Description: Interventions:  - What would you like us to know as we care for you?  - Provide timely, complete, and accurate information to patient/family  - Incorporate patient and family knowledge, values, beliefs, and cultural backgrounds into the planning and delivery of care  - Encourage patient/family to participate in care and decision-making at the level they choose  - Honor patient and family perspectives and choices  Outcome: Progressing     Problem: Diabetes/Glucose Control  Goal: Glucose maintained within prescribed range  Description: INTERVENTIONS:  - Monitor Blood Glucose as ordered  - Assess for signs and symptoms of hyperglycemia and hypoglycemia  - Administer ordered medications to maintain glucose within target range  - Assess barriers to adequate nutritional intake and initiate nutrition consult as needed  - Instruct patient on self management of diabetes  Outcome: Progressing     Problem: Patient/Family Goals  Goal: Patient/Family Long Term Goal  Description: Patient's Long Term Goal:    Interventions:  - See additional Care Plan goals for specific interventions  Outcome: Progressing  Goal: Patient/Family Short Term Goal  Description: Patient's Short Term Goal:    Interventions:   - See additional Care Plan goals for specific interventions  Outcome: Progressing     Problem: CARDIOVASCULAR - ADULT  Goal: Maintains optimal cardiac output and hemodynamic stability  Description: INTERVENTIONS:  - Monitor vital signs, rhythm, and trends  - Monitor for bleeding, hypotension and signs of decreased cardiac output  - Evaluate effectiveness of vasoactive medications to optimize hemodynamic stability  - Monitor arterial and/or venous puncture sites for bleeding  and/or hematoma  - Assess quality of pulses, skin color and temperature  - Assess for signs of decreased coronary artery perfusion - ex. Angina  - Evaluate fluid balance, assess for edema, trend weights  Outcome: Progressing  Goal: Absence of cardiac arrhythmias or at baseline  Description: INTERVENTIONS:  - Continuous cardiac monitoring, monitor vital signs, obtain 12 lead EKG if indicated  - Evaluate effectiveness of antiarrhythmic and heart rate control medications as ordered  - Initiate emergency measures for life threatening arrhythmias  - Monitor electrolytes and administer replacement therapy as ordered  Outcome: Progressing     Problem: RESPIRATORY - ADULT  Goal: Achieves optimal ventilation and oxygenation  Description: INTERVENTIONS:  - Assess for changes in respiratory status  - Assess for changes in mentation and behavior  - Position to facilitate oxygenation and minimize respiratory effort  - Oxygen supplementation based on oxygen saturation or ABGs  - Provide Smoking Cessation handout, if applicable  - Encourage broncho-pulmonary hygiene including cough, deep breathe, Incentive Spirometry  - Assess the need for suctioning and perform as needed  - Assess and instruct to report SOB or any respiratory difficulty  - Respiratory Therapy support as indicated  - Manage/alleviate anxiety  - Monitor for signs/symptoms of CO2 retention  Outcome: Progressing     Problem: GASTROINTESTINAL - ADULT  Goal: Minimal or absence of nausea and vomiting  Description: INTERVENTIONS:  - Maintain adequate hydration with IV or PO as ordered and tolerated  - Nasogastric tube to low intermittent suction as ordered  - Evaluate effectiveness of ordered antiemetic medications  - Provide nonpharmacologic comfort measures as appropriate  - Advance diet as tolerated, if ordered  - Obtain nutritional consult as needed  - Evaluate fluid balance  Outcome: Progressing  Goal: Maintains or returns to baseline bowel function  Description:  INTERVENTIONS:  - Assess bowel function  - Maintain adequate hydration with IV or PO as ordered and tolerated  - Evaluate effectiveness of GI medications  - Encourage mobilization and activity  - Obtain nutritional consult as needed  - Establish a toileting routine/schedule  - Consider collaborating with pharmacy to review patient's medication profile  Outcome: Progressing     Problem: METABOLIC/FLUID AND ELECTROLYTES - ADULT  Goal: Glucose maintained within prescribed range  Description: INTERVENTIONS:  - Monitor Blood Glucose as ordered  - Assess for signs and symptoms of hyperglycemia and hypoglycemia  - Administer ordered medications to maintain glucose within target range  - Assess barriers to adequate nutritional intake and initiate nutrition consult as needed  - Instruct patient on self management of diabetes  Outcome: Progressing  Goal: Electrolytes maintained within normal limits  Description: INTERVENTIONS:  - Monitor labs and rhythm and assess patient for signs and symptoms of electrolyte imbalances  - Administer electrolyte replacement as ordered  - Monitor response to electrolyte replacements, including rhythm and repeat lab results as appropriate  - Fluid restriction as ordered  - Instruct patient on fluid and nutrition restrictions as appropriate  Outcome: Progressing  Goal: Hemodynamic stability and optimal renal function maintained  Description: INTERVENTIONS:  - Monitor labs and assess for signs and symptoms of volume excess or deficit  - Monitor intake, output and patient weight  - Monitor urine specific gravity, serum osmolarity and serum sodium as indicated or ordered  - Monitor response to interventions for patient's volume status, including labs, urine output, blood pressure (other measures as available)  - Encourage oral intake as appropriate  - Instruct patient on fluid and nutrition restrictions as appropriate  Outcome: Progressing     Problem: SKIN/TISSUE INTEGRITY - ADULT  Goal: Skin  integrity remains intact  Description: INTERVENTIONS  - Assess and document risk factors for pressure ulcer development  - Assess and document skin integrity  - Monitor for areas of redness and/or skin breakdown  - Initiate interventions, skin care algorithm/standards of care as needed  Outcome: Progressing  Goal: Incision(s), wounds(s) or drain site(s) healing without S/S of infection  Description: INTERVENTIONS:  - Assess and document risk factors for pressure ulcer development  - Assess and document skin integrity  - Assess and document dressing/incision, wound bed, drain sites and surrounding tissue  - Implement wound care per orders  - Initiate isolation precautions as appropriate  - Initiate Pressure Ulcer prevention bundle as indicated  Outcome: Progressing  Goal: Oral mucous membranes remain intact  Description: INTERVENTIONS  - Assess oral mucosa and hygiene practices  - Implement preventative oral hygiene regimen  - Implement oral medicated treatments as ordered  Outcome: Progressing     Problem: HEMATOLOGIC - ADULT  Goal: Maintains hematologic stability  Description: INTERVENTIONS  - Assess for signs and symptoms of bleeding or hemorrhage  - Monitor labs and vital signs for trends  - Administer supportive blood products/factors, fluids and medications as ordered and appropriate  - Administer supportive blood products/factors as ordered and appropriate  Outcome: Progressing  Goal: Free from bleeding injury  Description: (Example usage: patient with low platelets)  INTERVENTIONS:  - Avoid intramuscular injections, enemas and rectal medication administration  - Ensure safe mobilization of patient  - Hold pressure on venipuncture sites to achieve adequate hemostasis  - Assess for signs and symptoms of internal bleeding  - Monitor lab trends  - Patient is to report abnormal signs of bleeding to staff  - Avoid use of toothpicks and dental floss  - Use electric shaver for shaving  - Use soft bristle tooth  brush  - Limit straining and forceful nose blowing  Outcome: Progressing     Problem: MUSCULOSKELETAL - ADULT  Goal: Return mobility to safest level of function  Description: INTERVENTIONS:  - Assess patient stability and activity tolerance for standing, transferring and ambulating w/ or w/o assistive devices  - Assist with transfers and ambulation using safe patient handling equipment as needed  - Ensure adequate protection for wounds/incisions during mobilization  - Obtain PT/OT consults as needed  - Advance activity as appropriate  - Communicate ordered activity level and limitations with patient/family  Outcome: Progressing  Goal: Maintain proper alignment of affected body part  Description: INTERVENTIONS:  - Support and protect limb and body alignment per provider's orders  - Instruct and reinforce with patient and family use of appropriate assistive device and precautions (e.g. spinal or hip dislocation precautions)  Outcome: Progressing     Problem: NEUROLOGICAL - ADULT  Goal: Achieves stable or improved neurological status  Description: INTERVENTIONS  - Assess for and report changes in neurological status  - Initiate measures to prevent increased intracranial pressure  - Maintain blood pressure and fluid volume within ordered parameters to optimize cerebral perfusion and minimize risk of hemorrhage  - Monitor temperature, glucose, and sodium. Initiate appropriate interventions as ordered  Outcome: Progressing     Problem: Safety Risk - Non-Violent Restraints  Goal: Patient will remain free from self-harm  Description: INTERVENTIONS:  - Apply the least restrictive restraint to prevent harm  - Notify patient and family of reasons restraints applied  - Assess for any contributing factors to confusion (electrolyte disturbances, delirium, medications)  - Discontinue any unnecessary medical devices as soon as possible  - Assess the patient's physical comfort, circulation, skin condition, hydration, nutrition and  elimination needs   - Reorient and redirection as needed  - Assess for the need to continue restraints  12/1/2024 0657 by Christopher Mars, RN  Outcome: Not Progressing  11/30/2024 2320 by Christopher Mars, RN  Outcome: Not Progressing

## 2024-12-02 ENCOUNTER — APPOINTMENT (OUTPATIENT)
Dept: GENERAL RADIOLOGY | Facility: HOSPITAL | Age: 59
DRG: 003 | End: 2024-12-02
Attending: INTERNAL MEDICINE
Payer: COMMERCIAL

## 2024-12-02 ENCOUNTER — APPOINTMENT (OUTPATIENT)
Dept: GENERAL RADIOLOGY | Facility: HOSPITAL | Age: 59
End: 2024-12-02
Attending: INTERNAL MEDICINE
Payer: COMMERCIAL

## 2024-12-02 LAB
ALBUMIN SERPL-MCNC: 3.6 G/DL (ref 3.2–4.8)
ANION GAP SERPL CALC-SCNC: 10 MMOL/L (ref 0–18)
BASOPHILS # BLD: 0 X10(3) UL (ref 0–0.2)
BASOPHILS NFR BLD: 0 %
BUN BLD-MCNC: 29 MG/DL (ref 9–23)
CALCIUM BLD-MCNC: 9.1 MG/DL (ref 8.7–10.4)
CHLORIDE SERPL-SCNC: 110 MMOL/L (ref 98–112)
CO2 SERPL-SCNC: 20 MMOL/L (ref 21–32)
CREAT BLD-MCNC: 1.46 MG/DL
DEPRECATED RDW RBC AUTO: 47.5 FL (ref 35.1–46.3)
EGFRCR SERPLBLD CKD-EPI 2021: 41 ML/MIN/1.73M2 (ref 60–?)
EOSINOPHIL # BLD: 0.42 X10(3) UL (ref 0–0.7)
EOSINOPHIL NFR BLD: 4 %
ERYTHROCYTE [DISTWIDTH] IN BLOOD BY AUTOMATED COUNT: 16.3 % (ref 11–15)
GLUCOSE BLD-MCNC: 140 MG/DL (ref 70–99)
GLUCOSE BLDC GLUCOMTR-MCNC: 123 MG/DL (ref 70–99)
GLUCOSE BLDC GLUCOMTR-MCNC: 130 MG/DL (ref 70–99)
GLUCOSE BLDC GLUCOMTR-MCNC: 150 MG/DL (ref 70–99)
GLUCOSE BLDC GLUCOMTR-MCNC: 154 MG/DL (ref 70–99)
HCT VFR BLD AUTO: 25.1 %
HGB BLD-MCNC: 8.3 G/DL
LYMPHOCYTES NFR BLD: 1.35 X10(3) UL (ref 1–4)
LYMPHOCYTES NFR BLD: 13 %
MAGNESIUM SERPL-MCNC: 2.2 MG/DL (ref 1.6–2.6)
MCH RBC QN AUTO: 26.9 PG (ref 26–34)
MCHC RBC AUTO-ENTMCNC: 33.1 G/DL (ref 31–37)
MCV RBC AUTO: 81.2 FL
METAMYELOCYTES # BLD: 0.21 X10(3) UL
METAMYELOCYTES NFR BLD: 2 %
MONOCYTES # BLD: 0.62 X10(3) UL (ref 0.1–1)
MONOCYTES NFR BLD: 6 %
MORPHOLOGY: NORMAL
NEUTROPHILS # BLD AUTO: 7.08 X10 (3) UL (ref 1.5–7.7)
NEUTROPHILS NFR BLD: 65 %
NEUTS BAND NFR BLD: 10 %
NEUTS HYPERSEG # BLD: 7.8 X10(3) UL (ref 1.5–7.7)
PHOSPHATE SERPL-MCNC: 5.2 MG/DL (ref 2.4–5.1)
PLATELET # BLD AUTO: 211 10(3)UL (ref 150–450)
PLATELET MORPHOLOGY: NORMAL
POTASSIUM SERPL-SCNC: 4.6 MMOL/L (ref 3.5–5.1)
RBC # BLD AUTO: 3.09 X10(6)UL
SODIUM SERPL-SCNC: 140 MMOL/L (ref 136–145)
TOTAL CELLS COUNTED BLD: 100
WBC # BLD AUTO: 10.4 X10(3) UL (ref 4–11)

## 2024-12-02 PROCEDURE — 36410 VNPNXR 3YR/> PHY/QHP DX/THER: CPT | Performed by: NURSE PRACTITIONER

## 2024-12-02 PROCEDURE — 99233 SBSQ HOSP IP/OBS HIGH 50: CPT | Performed by: INTERNAL MEDICINE

## 2024-12-02 PROCEDURE — 99291 CRITICAL CARE FIRST HOUR: CPT | Performed by: INTERNAL MEDICINE

## 2024-12-02 PROCEDURE — 99233 SBSQ HOSP IP/OBS HIGH 50: CPT | Performed by: HOSPITALIST

## 2024-12-02 PROCEDURE — 76937 US GUIDE VASCULAR ACCESS: CPT | Performed by: NURSE PRACTITIONER

## 2024-12-02 PROCEDURE — 02HV33Z INSERTION OF INFUSION DEVICE INTO SUPERIOR VENA CAVA, PERCUTANEOUS APPROACH: ICD-10-PCS | Performed by: HOSPITALIST

## 2024-12-02 PROCEDURE — 71045 X-RAY EXAM CHEST 1 VIEW: CPT | Performed by: INTERNAL MEDICINE

## 2024-12-02 PROCEDURE — B5181ZA FLUOROSCOPY OF SUPERIOR VENA CAVA USING LOW OSMOLAR CONTRAST, GUIDANCE: ICD-10-PCS | Performed by: HOSPITALIST

## 2024-12-02 RX ORDER — FUROSEMIDE 10 MG/ML
40 INJECTION INTRAMUSCULAR; INTRAVENOUS DAILY
Status: DISCONTINUED | OUTPATIENT
Start: 2024-12-03 | End: 2024-12-06

## 2024-12-02 RX ORDER — LIDOCAINE HYDROCHLORIDE 10 MG/ML
5 INJECTION, SOLUTION EPIDURAL; INFILTRATION; INTRACAUDAL; PERINEURAL
Status: COMPLETED | OUTPATIENT
Start: 2024-12-02 | End: 2024-12-02

## 2024-12-02 NOTE — PROGRESS NOTES
[facility]  part of [facility]    Cardiology Progress Note    Alisha Wilde Patient Status:  Inpatient    10/25/1965 MRN [id number]   Location [facility] 2W/SW Attending Aguila Villegas MD   Hosp Day # 10 PCP Bridger Avendano MD     Interval History   Remains intubated  Started on labetalol gtt    Assessment and Plan:   Acute hypoxic respiratory failure, c/b aspiration event requiring re-intubation  Type A aortic dissection  NAIMA on CKD-III, improved  Obesity  Hypertension  EtOH abuse  -presented with acute onset CP   -CT with type A aortic dissection above right coronary artery and extending distally into the left common iliac artery and external iliac   -s/p emergent successful repair on 24  -had aspiration event following self-extubation, now re-intubated  -TTE on  with hyperdynamic LVEF  -continue BP management:   -on labetalol gtt   -on hydralazine 150mg TID + isosorbide 20mg TID   -increase hydralazine to 50mg with isosorbide 20mg TID   -continue amlodipine 10mg daily   -ARB held by nephrology due to NAIMA/CKD  -volume management per nephrology, on lasix  -monitor rhythm on tele    Discussion on PEG tube/trach given ongoing failure with SBT.    Objective:   Patient Vitals for the past 24 hrs:   BP Temp Temp src Pulse Resp SpO2 Weight   24 1500 119/66 -- -- 70 18 92 % --   24 1400 124/66 -- -- 71 18 95 % --   24 1300 122/66 -- -- 68 18 97 % --   24 1200 124/68 98.4 °F (36.9 °C) Axillary 68 18 97 % --   24 1100 127/75 -- -- 69 18 98 % --   24 1000 129/71 -- -- 69 18 97 % --   24 0900 128/69 -- -- 70 18 97 % --   24 0800 (!) 143/118 97.7 °F (36.5 °C) Temporal 70 18 98 % --   24 0700 130/70 -- -- 69 18 99 % --   24 0621 -- -- -- -- -- -- 282 lb 3 oz (128 kg)   24 0600 95/53 -- -- 68 21 97 % --   24 0500 121/ -- -- 70 18 97 % --   24 0400 119 98.4 °F (36.9 °C) Temporal 72 18 98 % --    11/26/24 0200 109/60 -- -- 72 21 96 % --   11/26/24 0130 -- -- -- 72 18 97 % --   11/26/24 0100 142/73 -- -- 71 18 98 % --   11/26/24 0030 -- -- -- 71 18 99 % --   11/26/24 0000 139/70 98.3 °F (36.8 °C) Temporal 70 18 98 % --   11/25/24 2300 130/69 -- -- 72 18 98 % --   11/25/24 2200 135/70 -- -- 71 18 98 % --   11/25/24 2100 138/71 -- -- 71 18 99 % --   11/25/24 2000 132/68 98.1 °F (36.7 °C) Temporal 69 18 99 % --   11/25/24 1900 124/64 -- -- 71 18 98 % --   11/25/24 1800 136/72 -- -- 71 18 98 % --   11/25/24 1700 138/70 -- -- 70 18 98 % --       Intake/Output:   Last 3 shifts:   Intake/Output                   11/24/24 0700 - 11/25/24 0659 11/25/24 0700 - 11/26/24 0659 11/26/24 0700 - 11/27/24 0659       Intake    P.O.  0  0  --    P.O. 0 0 --    I.V.  2276.7  1884.9  --    I.V. 154 615 --    Volume (mL) Insulin 53.9 37 --    Volume (mL) Nitroglycerin 236.9 54.5 --    Volume (mL) Dobutamine 4.5 -- --    Volume (mL) Propofol 477.8 713.9 --    Volume (mL) Dexmedetomidine 352.2 384.5 --    Volume (mL) (dextrose 5%-sodium chloride 0.45% infusion) 997.4 80 --    NG/GT  50  150  60    Intake (mL) (NG/OG Tube Orogastric 16 Fr. Right mouth) 50 150 60    IV PIGGYBACK  600  500  --    Volume (mL) (piperacillin-tazobactam (Zosyn) 3.375 g in dextrose 5% 100 mL IVPB-ADDV) 300 200 --    Volume (mL) (acetaminophen (Ofirmev) 10 mg/mL infusion premix 1,000 mg) 200 100 --    Volume (mL) (potassium chloride 20 mEq/100mL IVPB premix 20 mEq) -- 100 --    Volume (mL) (potassium chloride 40 mEq/100mL IVPB premix (central line) 40 mEq) 100 100 --    Total Intake 2926.7 2534.9 60       Output    Urine  1800  3570  800    Output (mL) (Urethral Catheter Latex;Temperature probe;Double-lumen) 1800 3570 800    Emesis/NG output  550  365  --    Residual volume (ml) (NG/OG Tube Orogastric 16 Fr. Right mouth) -- 5 --    Output (mL) (NG/OG Tube Orogastric 16 Fr. Right mouth) 550 360 --    Chest Tube  188  125  30    Output (mL) ([REMOVED] Chest  Tube Anterior Mediastinal 32 Fr.) 48 50 --    Output (mL) (Chest Tube Anterior Pericardial 24 Fr.) 140 75 30    Total Output 2538 4060 830       Net I/O     388.7 -1525.1 -770             Vent Settings: Vent Mode: VC+  FiO2 (%):  [40 %] 40 %  S RR:  [18] 18  S VT:  [550 mL] 550 mL  PEEP/CPAP (cm H2O):  [5 cm H20] 5 cm H20  MAP (cm H2O):  [12-13] 12    Hemodynamic parameters (last 24 hours):      Scheduled Meds:    furosemide  40 mg Intravenous BID (Diuretic)    hydrALAZINE  150 mg Oral Q8H [facility]    cloNIDine  1 patch Transdermal Weekly    isosorbide dinitrate  20 mg Per NG Tube TID (Nitrates)    insulin regular human  1-5 Units Subcutaneous 4 times per day    carvedilol  6.25 mg Oral BID with meals    amLODIPine  10 mg Oral Daily    [Held by provider] losartan  50 mg Oral Daily    sodium chloride  3 mL Nebulization 4 times per day    albuterol  2.5 mg Nebulization 4 times per day    heparin  5,000 Units Subcutaneous Q8H [facility]    chlorhexidine gluconate  15 mL Mouth/Throat BID    famotidine  20 mg Oral Daily    Or    famotidine  20 mg Intravenous Daily    aspirin  81 mg Oral Daily       Continuous Infusions:    labetalol (Trandate) 400 mg in sodium chloride 0.9% 200 mL infusion 1 mg/min (12/02/24 0930)    dextrose 10%      dexmedetomidine 1.2 mcg/kg/hr (12/02/24 0950)    norepinephrine Stopped (11/23/24 2225)    propofol 50 mcg/kg/min (12/02/24 1000)       Results:     Lab Results   Component Value Date    WBC 10.4 12/02/2024    HGB 8.3 (L) 12/02/2024    HCT 25.1 (L) 12/02/2024    .0 12/02/2024    CREATSERUM 1.46 (H) 12/02/2024    BUN 29 (H) 12/02/2024     12/02/2024    K 4.6 12/02/2024     12/02/2024    CO2 20.0 (L) 12/02/2024     (H) 12/02/2024    CA 9.1 12/02/2024    ALB 3.6 12/02/2024    ALKPHO 42 (L) 11/25/2024    BILT 0.4 11/25/2024    TP 5.4 (L) 11/25/2024    AST 20 11/25/2024    ALT <7 (L) 11/25/2024    PTT 28.2 11/22/2024    INR 1.25 (H) 11/22/2024    TSH 0.704 09/29/2024    NATHALIE 99  11/25/2024    LIP 28 11/25/2024    DDIMER 0.42 12/08/2019    ESRML 15 06/05/2021    MG 2.2 12/02/2024    PHOS 5.2 (H) 12/02/2024    TROP <0.045 12/08/2019     (H) 09/23/2021    B12 1,156 (H) 07/23/2018       Recent Labs   Lab 11/30/24  0825 12/01/24  0510 12/02/24  0434 12/02/24  0636   * 143* 140*  --    BUN 29* 31*  --  29*   CREATSERUM 1.35* 1.39* 1.46*  --    CA 9.2 9.0 9.1  --     143 140  --    K 4.6 4.1  --  4.6   * 114* 110  --    CO2 21.0 20.0* 20.0*  --      Recent Labs   Lab 11/30/24  0457 12/01/24  0510 12/02/24  0432   RBC 3.16* 2.78* 3.09*   HGB 8.8* 7.7* 8.3*   HCT 26.3* 23.0* 25.1*   MCV 83.2 82.7 81.2   MCH 27.8 27.7 26.9   MCHC 33.5 33.5 33.1   RDW 16.2* 16.2* 16.3*   NEPRELIM 7.59 7.40 7.08   WBC 12.2* 11.9* 10.4   .0 168.0 211.0       No results for input(s): \"BNPML\" in the last 168 hours.    No results for input(s): \"TROP\", \"CK\" in the last 168 hours.    XR CHEST AP PORTABLE  (CPT=71045)    Result Date: 12/2/2024  CONCLUSION:  1. Redemonstration of multifocal airspace disease, perhaps indicative of infectious process or developing pulmonary alveolar edema. Follow-up is advised to document resolution.  2. Additional support tubes and lines as above.    elm-remote.   Dictated by (CST): Eulalio Shaikh MD on 12/02/2024 at 7:57 AM     Finalized by (CST): Eulalio Shaikh MD on 12/02/2024 at 8:00 AM          XR CHEST AP PORTABLE  (CPT=71045)    Result Date: 12/1/2024  CONCLUSION:  1. Multifocal patchy opacities throughout the right greater than left lungs are similar in comparison to previous day chest radiograph. 2. Stable radiographic appearance of support apparatus.    Dictated by (CST): Evaristo Rosado MD on 12/01/2024 at 9:04 AM     Finalized by (CST): Evaristo Rosado MD on 12/01/2024 at 9:06 AM                    Exam:     Physical Exam:  General: intubated  HEENT: Normocephalic, anicteric sclera, neck supple, no thyromegaly or adenopathy.  Neck: No JVD, carotids  2+, no bruits.  Cardiac: Regular rate and rhythm. S1, S2 normal.   Lungs: Clear without wheezes, rales, rhonchi or dullness.    Abdomen: Soft, non-tender. No organosplenomegally, mass or rebound, BS-present.  Extremities: Without clubbing or cyanosis. No edema.    Neurologic: unable to obtain  Skin: Warm and dry.     Edward Montgomery, [facility] [place]

## 2024-12-02 NOTE — PLAN OF CARE
Problem: PAIN - ADULT  Goal: Verbalizes/displays adequate comfort level or patient's stated pain goal  Description: INTERVENTIONS:  - Encourage pt to monitor pain and request assistance  - Assess pain using appropriate pain scale  - Administer analgesics based on type and severity of pain and evaluate response  - Implement non-pharmacological measures as appropriate and evaluate response  - Consider cultural and social influences on pain and pain management  - Manage/alleviate anxiety  - Utilize distraction and/or relaxation techniques  - Monitor for opioid side effects  - Notify MD/LIP if interventions unsuccessful or patient reports new pain  - Anticipate increased pain with activity and pre-medicate as appropriate  Outcome: Progressing   Problem: Safety Risk - Non-Violent Restraints  Goal: Patient will remain free from self-harm  Description: INTERVENTIONS:  - Apply the least restrictive restraint to prevent harm  - Notify patient and family of reasons restraints applied  - Assess for any contributing factors to confusion (electrolyte disturbances, delirium, medications)  - Discontinue any unnecessary medical devices as soon as possible  - Assess the patient's physical comfort, circulation, skin condition, hydration, nutrition and elimination needs   - Reorient and redirection as needed  - Assess for the need to continue restraints  Outcome: Progressing     Problem: NEUROLOGICAL - ADULT  Goal: Achieves stable or improved neurological status  Description: INTERVENTIONS  - Assess for and report changes in neurological status  - Initiate measures to prevent increased intracranial pressure  - Maintain blood pressure and fluid volume within ordered parameters to optimize cerebral perfusion and minimize risk of hemorrhage  - Monitor temperature, glucose, and sodium. Initiate appropriate interventions as ordered  Outcome: Progressing     Problem: MUSCULOSKELETAL - ADULT  Goal: Return mobility to safest level of  function  Description: INTERVENTIONS:  - Assess patient stability and activity tolerance for standing, transferring and ambulating w/ or w/o assistive devices  - Assist with transfers and ambulation using safe patient handling equipment as needed  - Ensure adequate protection for wounds/incisions during mobilization  - Obtain PT/OT consults as needed  - Advance activity as appropriate  - Communicate ordered activity level and limitations with patient/family  Outcome: Progressing  Goal: Maintain proper alignment of affected body part  Description: INTERVENTIONS:  - Support and protect limb and body alignment per provider's orders  - Instruct and reinforce with patient and family use of appropriate assistive device and precautions (e.g. spinal or hip dislocation precautions)  Outcome: Progressing     Problem: HEMATOLOGIC - ADULT  Goal: Maintains hematologic stability  Description: INTERVENTIONS  - Assess for signs and symptoms of bleeding or hemorrhage  - Monitor labs and vital signs for trends  - Administer supportive blood products/factors, fluids and medications as ordered and appropriate  - Administer supportive blood products/factors as ordered and appropriate  Outcome: Progressing  Goal: Free from bleeding injury  Description: (Example usage: patient with low platelets)  INTERVENTIONS:  - Avoid intramuscular injections, enemas and rectal medication administration  - Ensure safe mobilization of patient  - Hold pressure on venipuncture sites to achieve adequate hemostasis  - Assess for signs and symptoms of internal bleeding  - Monitor lab trends  - Patient is to report abnormal signs of bleeding to staff  - Avoid use of toothpicks and dental floss  - Use electric shaver for shaving  - Use soft bristle tooth brush  - Limit straining and forceful nose blowing  Outcome: Progressing     Problem: SKIN/TISSUE INTEGRITY - ADULT  Goal: Skin integrity remains intact  Description: INTERVENTIONS  - Assess and document risk  factors for pressure ulcer development  - Assess and document skin integrity  - Monitor for areas of redness and/or skin breakdown  - Initiate interventions, skin care algorithm/standards of care as needed  Outcome: Progressing  Goal: Incision(s), wounds(s) or drain site(s) healing without S/S of infection  Description: INTERVENTIONS:  - Assess and document risk factors for pressure ulcer development  - Assess and document skin integrity  - Assess and document dressing/incision, wound bed, drain sites and surrounding tissue  - Implement wound care per orders  - Initiate isolation precautions as appropriate  - Initiate Pressure Ulcer prevention bundle as indicated  Outcome: Progressing  Goal: Oral mucous membranes remain intact  Description: INTERVENTIONS  - Assess oral mucosa and hygiene practices  - Implement preventative oral hygiene regimen  - Implement oral medicated treatments as ordered  Outcome: Progressing     Problem: METABOLIC/FLUID AND ELECTROLYTES - ADULT  Goal: Glucose maintained within prescribed range  Description: INTERVENTIONS:  - Monitor Blood Glucose as ordered  - Assess for signs and symptoms of hyperglycemia and hypoglycemia  - Administer ordered medications to maintain glucose within target range  - Assess barriers to adequate nutritional intake and initiate nutrition consult as needed  - Instruct patient on self management of diabetes  Outcome: Progressing  Goal: Electrolytes maintained within normal limits  Description: INTERVENTIONS:  - Monitor labs and rhythm and assess patient for signs and symptoms of electrolyte imbalances  - Administer electrolyte replacement as ordered  - Monitor response to electrolyte replacements, including rhythm and repeat lab results as appropriate  - Fluid restriction as ordered  - Instruct patient on fluid and nutrition restrictions as appropriate  Outcome: Progressing  Goal: Hemodynamic stability and optimal renal function maintained  Description:  INTERVENTIONS:  - Monitor labs and assess for signs and symptoms of volume excess or deficit  - Monitor intake, output and patient weight  - Monitor urine specific gravity, serum osmolarity and serum sodium as indicated or ordered  - Monitor response to interventions for patient's volume status, including labs, urine output, blood pressure (other measures as available)  - Encourage oral intake as appropriate  - Instruct patient on fluid and nutrition restrictions as appropriate  Outcome: Progressing     Problem: RESPIRATORY - ADULT  Goal: Achieves optimal ventilation and oxygenation  Description: INTERVENTIONS:  - Assess for changes in respiratory status  - Assess for changes in mentation and behavior  - Position to facilitate oxygenation and minimize respiratory effort  - Oxygen supplementation based on oxygen saturation or ABGs  - Provide Smoking Cessation handout, if applicable  - Encourage broncho-pulmonary hygiene including cough, deep breathe, Incentive Spirometry  - Assess the need for suctioning and perform as needed  - Assess and instruct to report SOB or any respiratory difficulty  - Respiratory Therapy support as indicated  - Manage/alleviate anxiety  - Monitor for signs/symptoms of CO2 retention  Outcome: Progressing     Problem: CARDIOVASCULAR - ADULT  Goal: Maintains optimal cardiac output and hemodynamic stability  Description: INTERVENTIONS:  - Monitor vital signs, rhythm, and trends  - Monitor for bleeding, hypotension and signs of decreased cardiac output  - Evaluate effectiveness of vasoactive medications to optimize hemodynamic stability  - Monitor arterial and/or venous puncture sites for bleeding and/or hematoma  - Assess quality of pulses, skin color and temperature  - Assess for signs of decreased coronary artery perfusion - ex. Angina  - Evaluate fluid balance, assess for edema, trend weights  Outcome: Progressing  Goal: Absence of cardiac arrhythmias or at baseline  Description:  INTERVENTIONS:  - Continuous cardiac monitoring, monitor vital signs, obtain 12 lead EKG if indicated  - Evaluate effectiveness of antiarrhythmic and heart rate control medications as ordered  - Initiate emergency measures for life threatening arrhythmias  - Monitor electrolytes and administer replacement therapy as ordered  Outcome: Progressing      Problem: Diabetes/Glucose Control  Goal: Glucose maintained within prescribed range  Description: INTERVENTIONS:  - Monitor Blood Glucose as ordered  - Assess for signs and symptoms of hyperglycemia and hypoglycemia  - Administer ordered medications to maintain glucose within target range  - Assess barriers to adequate nutritional intake and initiate nutrition consult as needed  - Instruct patient on self management of diabetes  Outcome: Progressing    Christy Pierce, RN, BSN, CCRN

## 2024-12-02 NOTE — DIETARY NOTE
ADULT NUTRITION REASSESSMENT    Pt is at high nutrition risk.  Pt does not meet malnutrition criteria.      RECOMMENDATIONS TO MD: See Nutrition Intervention for EN specifics . CPM,  propofol running at 35.1ml/hr providing ~925 kcals from lipids. Now off IVF and insulin drip. Minimal flushes- significant edema.       1127:  -Attempted to discuss with CYNDIE Milan via phone call. RN states Propofol is current running at 45 mcg/kg/min (31.6ml/hr providing 835 kcal from lipids).   -RN states IV fluids currently running: D5/NaCl 0.45%. States current rate in chart is accurate (40 ml/hr). IVF status has not been updated in active meds nor in flowsheets since 11/25 @ 0600.   -RN states pt continues on insulin drip per protocol \"while pt is intubated.\"   -Sent secure chat to APN today to confirm if IVF/insulin drip will continue once tube feedings are started. Awaiting response.     ADMITTING DIAGNOSIS:  Dissection of ascending aorta (HCC)  PERTINENT PAST MEDICAL HISTORY:   Past Medical History:    Chronic kidney disease (CKD)    Esophageal reflux    High blood pressure    High cholesterol    HYPERTENSION    Hypertension    Unspecified essential hypertension     PATIENT STATUS: Initial 11/27/24: RD received consult to initiate tube feedings. Pt is POD #5 emergent aortic dissection and repair. Pt has been intubated, unable to extubate failing SBT's. NPO x 6 days today. Well-nourished. No weight loss, actually some gain since admission, likely r/t post op fluid changes. On Lasix. No pressors. Not at significant risk for refeeding syndrome.     Update 12/2/24: Pt cont to require full vent support. Failing SBTs. Will need trach and PEG this week per MD chart notes. Receiving high dose propofol  at 50mcg/kg/min. Blood sugars well-controlled. Non DM, off insulin gtt. Pt having thick secretions requiring significant amount of suction. Having BM's, last on 11/30. Now with new open keloid wound to right breast. Seen by wound care  RN. Cont TF, tolerating at goal rate with modular protein added BID.     FOOD/NUTRITION RELATED HISTORY:  Appetite: NPO  Intake: NPO  Intake Meeting Needs: NPO and TF + non-nutritive calories are meeting needs  Percent Meals Eaten (last 6 days)       None            Food Allergies: No Known Food Allergies (NKFA)  Cultural/Ethnic/Moravian Preferences: Not Obtained    GASTROINTESTINAL: +BM 11/30 and OG tube    MEDICATIONS: reviewed   furosemide  40 mg Intravenous BID (Diuretic)    hydrALAZINE  150 mg Oral Q8H GABRIEL    cloNIDine  1 patch Transdermal Weekly    isosorbide dinitrate  20 mg Per NG Tube TID (Nitrates)    insulin regular human  1-5 Units Subcutaneous 4 times per day    carvedilol  6.25 mg Oral BID with meals    amLODIPine  10 mg Oral Daily    [Held by provider] losartan  50 mg Oral Daily    sodium chloride  3 mL Nebulization 4 times per day    albuterol  2.5 mg Nebulization 4 times per day    heparin  5,000 Units Subcutaneous Q8H GABRIEL    chlorhexidine gluconate  15 mL Mouth/Throat BID    famotidine  20 mg Oral Daily    Or    famotidine  20 mg Intravenous Daily    aspirin  81 mg Oral Daily     LABS: reviewed; phos at 5.2 today, Neprho is following. Lasix was held d/t NAIMA, but resumed. TG's were 246 on 11/29  Recent Labs     11/30/24  0457 11/30/24  0825 12/01/24  0510 12/02/24  0434 12/02/24  0636   * 130* 143* 140*  --    BUN 30* 29* 31*  --  29*   CREATSERUM 1.41* 1.35* 1.39* 1.46*  --    CA 9.5 9.2 9.0 9.1  --    MG 2.3  --   --  2.2  --     143 143 140  --    K 4.6 4.6 4.1  --  4.6   * 114* 114* 110  --    CO2 20.0* 21.0 20.0* 20.0*  --    PHOS 3.9  --   --  5.2*  --    OSMOCALC 303* 304* 305*  --   --      NUTRITION RELATED PHYSICAL FINDINGS:  - Nutrition Focused Physical Exam (NFPE): well nourished  - Fluid Accumulation: non-pitting generalized, +2 Bilateral Hand (s) and Feet, and edematous tongue  ---> See RN documentation for details  - Skin Integrity: surgical wound(s) and other  wounds noted ---> See RN documentation for details    ANTHROPOMETRICS:  HT: 165.1 cm (5' 5\")  WT: 130.8 kg (288 lb 5.8 oz)   BMI: Body mass index is 47.99 kg/m².  BMI CLASSIFICATION: greater than 40 kg/m2 - morbid obesity class III  IBW: 115 lbs        246% IBW  Usual Body Wt: ~245 lbs in the past per EMR        115% UBW    WEIGHT HISTORY:  Patient Weight(s) for the past 336 hrs:   Weight   12/01/24 0600 130.8 kg (288 lb 5.8 oz)   11/30/24 0300 121.8 kg (268 lb 8.3 oz)   11/29/24 0600 120.8 kg (266 lb 5.1 oz)   11/27/24 0600 129.1 kg (284 lb 9.8 oz)   11/26/24 0621 128 kg (282 lb 3 oz)   11/25/24 0800 125 kg (275 lb 9.2 oz)   11/22/24 0419 117.1 kg (258 lb 2.5 oz)   11/22/24 0200 117.1 kg (258 lb 2.5 oz)     Wt Readings from Last 10 Encounters:   12/01/24 130.8 kg (288 lb 5.8 oz)   10/24/24 115.2 kg (254 lb)   05/17/24 112.9 kg (249 lb)   02/22/24 112.9 kg (249 lb)   12/06/23 112.9 kg (249 lb)   10/09/23 112.5 kg (248 lb)   09/25/23 111.1 kg (245 lb)   08/10/23 111.7 kg (246 lb 4.1 oz)   08/09/23 111.7 kg (246 lb 4.8 oz)   03/25/23 109.3 kg (241 lb)     NUTRITION DIAGNOSIS/PROBLEM:   Inadequate oral intake related to Decreased ability to consume sufficient energy as evidenced by NPO status and no PO intake during admission, and intubation requiring nutrition support.     Progress:   Improvement, unresolved - TF started and meeting needs with addition of modular protein and propofol. Plans for trach and PEG likely this week.     NUTRITION INTERVENTION:     NUTRITION PRESCRIPTION:   Estimated Nutrition needs: --dosing wt of 117 kg - wt taken on 11/22 - likely close to dry weight.  Calories: 1342-0629 calories/day (15-18 calories per kg Dosing wt)  Hypocaloric while intubated in critical care 12-15 kcal/kg using dosing weight: 7466-4552 kcal   [place]: 1895 kcal (on 12/2)  Protein: 62- 104g protein/day (1.2-2 g protein/kg Ideal body wt (IBW))  Fluid Needs: 1ml/kcal or 25ml/kg adjusted weight for obesity= 2075ml (83  kg adjusted)    - Diet:       Procedures    NPO      - Enteral Nutrition: Promote at 10 ml/hr advance by 10 ml q 10 hours to goal rate of 35 ml/hr via OGT. Based on average 22 hour infusion time. Give Prosource 1 pkts BID as med flush. Goal rate + Prosource provides 1775 kcal, 70 grams protein, 736ml free water. Meets 100% of estimated kcal and protein needs. Water flushes 50 ml q 4 hours (300 ml).  Total fluid daily = 1036 ml    - RD Care Plan:  Tolerating EN  - Medical Food Supplements-RD added NPO. Rational/use of oral supplements discussed.  - Vitamin and mineral supplements: none  - Feeding assistance: NPO  - Nutrition education: not appropriate     - Coordination of nutrition care: collaboration with other providers  - Discharge and transfer of nutrition care to new setting or provider: monitor plans    MONITOR AND EVALUATE/NUTRITION GOALS:  - Food and Nutrient Intake:      Monitor: for PO initiation when safe  - Food and Nutrient Administration:      Monitor: enteral nutrition initiation, tolerance to enteral nutrition, for enteral nutrition adjustment, and propofol rate  - Anthropometric Measurement:    Monitor weight  - Nutrition Goals:      allow wt loss due to fluid losses, long term gradual wt loss, TF meet >80% of goal within first week , labs within acceptable limits, and monitor fluid status    DIETITIAN FOLLOW UP: RD to follow and monitor nutrition status       Stefany Argueta RD, LDN  Clinical Dietitian  P: [phone]

## 2024-12-02 NOTE — PROGRESS NOTES
[facility]  part of [facility]    Progress Note    Alisha Wilde Patient Status:  Inpatient    10/25/1965 MRN [id number]   Location [facility] 2W/SW Attending Hayde Brito MD   Hosp Day # 10 PCP Bridger Avendano MD     Subjective:  Pt currently intubated/sedated. No visitors at bedside.     Objective:  /55 (BP Location: Right arm)   Pulse 74   Temp 98 °F (36.7 °C) (Temporal)   Resp 18   Ht 5' 5\" (1.651 m)   Wt 288 lb 5.8 oz (130.8 kg)   SpO2 99%   BMI 47.99 kg/m²     Temp (24hrs), Av.8 °F (36.6 °C), Min:97.2 °F (36.2 °C), Max:98.3 °F (36.8 °C)      Intake/Output:    Intake/Output Summary (Last 24 hours) at 2024 0857  Last data filed at 2024 0605  Gross per 24 hour   Intake 4561.01 ml   Output 5010 ml   Net -448.99 ml       Wt Readings from Last 3 Encounters:   24 288 lb 5.8 oz (130.8 kg)   10/24/24 254 lb (115.2 kg)   24 249 lb (112.9 kg)       Allergies:  Allergies[1]    Labs:  Lab Results   Component Value Date    WBC 10.4 2024    HGB 8.3 2024    HCT 25.1 2024    .0 2024    CREATSERUM 1.46 2024    BUN 29 2024     2024    K 4.6 2024     2024    CO2 20.0 2024     2024    CA 9.1 2024    ALB 3.6 2024    MG 2.2 2024    PHOS 5.2 2024       Physical Exam:  Blood pressure 130/55, pulse 74, temperature 98 °F (36.7 °C), temperature source Temporal, resp. rate 18, height 5' 5\" (1.651 m), weight 288 lb 5.8 oz (130.8 kg), SpO2 99%, not currently breastfeeding.  General: NAD  Neck: No JVD  Lungs: mildly coarse bilaterally anteriorly w/faint expiratory wheezes. Diminished bases laterally   Heart: RRR, S1, S2  Abdomen: Soft/Obese, NT/ND, BS+x 4/+BM  Extremities: Warm, dry, generalized UE & LE edema bilat  Pulses: 2+ bilat DP  Skin: sternotomy Provena removed as not functioning- will keep sternal incision CATHIE. Incision CDI  Neurological:   sedated    Assessment/Plan:  S/P EMERGENT AORTIC DISSECTION REPAIR POD # 10  Respiratory Failure-currently on full vent support. Daily SBT which pt fails after approx 20-30 min: becomes hypertensive/Tachypenic. Re-intubated on 11/22 per Pulm after emesis episode/ETT removal due to emesis content noted in airway. Anticipate pt needing Trach.  ABX for suspected aspiration pneumonia.   CT Chest/Abdomen/Pelvis (non-contrast) 11/26=no obstruction noted/stable. +BM (s)  HTN: on multiple antihypertensives including IV Labetolol-SBP goal= <130/MAP >70. Mgt per Cards. Plan to place new A-line today/remove current A-line which is functioning poorly.   Place PICC today- may remove RIJ TLC once inserted  Pain meds as needed  Scds/Heparin SubQ prophylaxis DVT prevention. HIPA 11/25=negative. Plts continue >100,000  Continue ICU status  Expected post op volume overload: mgt per Nephrology. Hx: CKD-limit/avoid nephrotoxic meds. On Lasix BID. Webb remains for close I/O monitoring & immobility.  Expected post op anemia: w/o clinical significance/stable.   No hx: DM-continue on correction scale coverage  Hx: Morbid obesity w/BMI >40-plan for RD to see when appropriate  Nutrition per TF via OGT-at goal -anticipate pt needing PEG  Hx: CKD w/mgt per Nephrology consulted on 11/23 for post op NAIMA-following recs  Hx: ETOH abuse-monitor for withdrawals. WA protocol PRN   Discharge planning: pt lives alone and has supportive family. Continue to monitor as pt progresses.   Will plan to discuss Trach/PEG w/family today.     Addendum @ 1600:  After discussing w/PCP & Pulm. Spoke with patient's brother, Osbaldo, via telephone this afternoon regarding Trach/PEG placement. Explained benefits & plan of care including continuing SBT. ENT/GI consulted per PCP. He stated he will discuss w/his sister, Mariajose. Updated another sister at bedside this afternoon who stated she also discussed w/Mariajose. She understood the need for Trach/PEG.        Plan of  care discussed w/RN and CV Surgeon: Dr. Marysol Noel, APRN  12/2/2024  8:57 AM         [1]   Allergies  Allergen Reactions    Atorvastatin MYALGIA    Pravastatin MYALGIA    Rosuvastatin MYALGIA    Seasonal Runny nose

## 2024-12-02 NOTE — PLAN OF CARE
Pt tolerating ventilator appropriately throughout the shift. Remains on bilateral soft wrist restraints due to poor safety awareness while intubated. Good diuresing overnight. Plan for SBTs this AM. Still remains on precedex and using PRN fentanyl for comfort. Remains on labetalol drip for blood pressure support.  Unable to get wait this morning due to inaccurate weight showing on bed.    Problem: Patient Centered Care  Goal: Patient preferences are identified and integrated in the patient's plan of care  Description: Interventions:  - What would you like us to know as we care for you?  - Provide timely, complete, and accurate information to patient/family  - Incorporate patient and family knowledge, values, beliefs, and cultural backgrounds into the planning and delivery of care  - Encourage patient/family to participate in care and decision-making at the level they choose  - Honor patient and family perspectives and choices  Outcome: Progressing     Problem: Diabetes/Glucose Control  Goal: Glucose maintained within prescribed range  Description: INTERVENTIONS:  - Monitor Blood Glucose as ordered  - Assess for signs and symptoms of hyperglycemia and hypoglycemia  - Administer ordered medications to maintain glucose within target range  - Assess barriers to adequate nutritional intake and initiate nutrition consult as needed  - Instruct patient on self management of diabetes  Outcome: Progressing     Problem: Patient/Family Goals  Goal: Patient/Family Long Term Goal  Description: Patient's Long Term Goal:    Interventions:  - See additional Care Plan goals for specific interventions  Outcome: Progressing  Goal: Patient/Family Short Term Goal  Description: Patient's Short Term Goal:    Interventions:  - See additional Care Plan goals for specific interventions  Outcome: Progressing     Problem: CARDIOVASCULAR - ADULT  Goal: Maintains optimal cardiac output and hemodynamic stability  Description: INTERVENTIONS:  -  Monitor vital signs, rhythm, and trends  - Monitor for bleeding, hypotension and signs of decreased cardiac output  - Evaluate effectiveness of vasoactive medications to optimize hemodynamic stability  - Monitor arterial and/or venous puncture sites for bleeding and/or hematoma  - Assess quality of pulses, skin color and temperature  - Assess for signs of decreased coronary artery perfusion - ex. Angina  - Evaluate fluid balance, assess for edema, trend weights  Outcome: Progressing  Goal: Absence of cardiac arrhythmias or at baseline  Description: INTERVENTIONS:  - Continuous cardiac monitoring, monitor vital signs, obtain 12 lead EKG if indicated  - Evaluate effectiveness of antiarrhythmic and heart rate control medications as ordered  - Initiate emergency measures for life threatening arrhythmias  - Monitor electrolytes and administer replacement therapy as ordered  Outcome: Progressing     Problem: RESPIRATORY - ADULT  Goal: Achieves optimal ventilation and oxygenation  Description: INTERVENTIONS:  - Assess for changes in respiratory status  - Assess for changes in mentation and behavior  - Position to facilitate oxygenation and minimize respiratory effort  - Oxygen supplementation based on oxygen saturation or ABGs  - Provide Smoking Cessation handout, if applicable  - Encourage broncho-pulmonary hygiene including cough, deep breathe, Incentive Spirometry  - Assess the need for suctioning and perform as needed  - Assess and instruct to report SOB or any respiratory difficulty  - Respiratory Therapy support as indicated  - Manage/alleviate anxiety  - Monitor for signs/symptoms of CO2 retention  Outcome: Progressing     Problem: GASTROINTESTINAL - ADULT  Goal: Minimal or absence of nausea and vomiting  Description: INTERVENTIONS:  - Maintain adequate hydration with IV or PO as ordered and tolerated  - Nasogastric tube to low intermittent suction as ordered  - Evaluate effectiveness of ordered antiemetic  medications  - Provide nonpharmacologic comfort measures as appropriate  - Advance diet as tolerated, if ordered  - Obtain nutritional consult as needed  - Evaluate fluid balance  Outcome: Progressing  Goal: Maintains or returns to baseline bowel function  Description: INTERVENTIONS:  - Assess bowel function  - Maintain adequate hydration with IV or PO as ordered and tolerated  - Evaluate effectiveness of GI medications  - Encourage mobilization and activity  - Obtain nutritional consult as needed  - Establish a toileting routine/schedule  - Consider collaborating with pharmacy to review patient's medication profile  Outcome: Progressing     Problem: METABOLIC/FLUID AND ELECTROLYTES - ADULT  Goal: Glucose maintained within prescribed range  Description: INTERVENTIONS:  - Monitor Blood Glucose as ordered  - Assess for signs and symptoms of hyperglycemia and hypoglycemia  - Administer ordered medications to maintain glucose within target range  - Assess barriers to adequate nutritional intake and initiate nutrition consult as needed  - Instruct patient on self management of diabetes  Outcome: Progressing  Goal: Electrolytes maintained within normal limits  Description: INTERVENTIONS:  - Monitor labs and rhythm and assess patient for signs and symptoms of electrolyte imbalances  - Administer electrolyte replacement as ordered  - Monitor response to electrolyte replacements, including rhythm and repeat lab results as appropriate  - Fluid restriction as ordered  - Instruct patient on fluid and nutrition restrictions as appropriate  Outcome: Progressing  Goal: Hemodynamic stability and optimal renal function maintained  Description: INTERVENTIONS:  - Monitor labs and assess for signs and symptoms of volume excess or deficit  - Monitor intake, output and patient weight  - Monitor urine specific gravity, serum osmolarity and serum sodium as indicated or ordered  - Monitor response to interventions for patient's volume  status, including labs, urine output, blood pressure (other measures as available)  - Encourage oral intake as appropriate  - Instruct patient on fluid and nutrition restrictions as appropriate  Outcome: Progressing     Problem: SKIN/TISSUE INTEGRITY - ADULT  Goal: Skin integrity remains intact  Description: INTERVENTIONS  - Assess and document risk factors for pressure ulcer development  - Assess and document skin integrity  - Monitor for areas of redness and/or skin breakdown  - Initiate interventions, skin care algorithm/standards of care as needed  Outcome: Progressing  Goal: Incision(s), wounds(s) or drain site(s) healing without S/S of infection  Description: INTERVENTIONS:  - Assess and document risk factors for pressure ulcer development  - Assess and document skin integrity  - Assess and document dressing/incision, wound bed, drain sites and surrounding tissue  - Implement wound care per orders  - Initiate isolation precautions as appropriate  - Initiate Pressure Ulcer prevention bundle as indicated  Outcome: Progressing  Goal: Oral mucous membranes remain intact  Description: INTERVENTIONS  - Assess oral mucosa and hygiene practices  - Implement preventative oral hygiene regimen  - Implement oral medicated treatments as ordered  Outcome: Progressing     Problem: HEMATOLOGIC - ADULT  Goal: Maintains hematologic stability  Description: INTERVENTIONS  - Assess for signs and symptoms of bleeding or hemorrhage  - Monitor labs and vital signs for trends  - Administer supportive blood products/factors, fluids and medications as ordered and appropriate  - Administer supportive blood products/factors as ordered and appropriate  Outcome: Progressing  Goal: Free from bleeding injury  Description: (Example usage: patient with low platelets)  INTERVENTIONS:  - Avoid intramuscular injections, enemas and rectal medication administration  - Ensure safe mobilization of patient  - Hold pressure on venipuncture sites to  achieve adequate hemostasis  - Assess for signs and symptoms of internal bleeding  - Monitor lab trends  - Patient is to report abnormal signs of bleeding to staff  - Avoid use of toothpicks and dental floss  - Use electric shaver for shaving  - Use soft bristle tooth brush  - Limit straining and forceful nose blowing  Outcome: Progressing     Problem: MUSCULOSKELETAL - ADULT  Goal: Return mobility to safest level of function  Description: INTERVENTIONS:  - Assess patient stability and activity tolerance for standing, transferring and ambulating w/ or w/o assistive devices  - Assist with transfers and ambulation using safe patient handling equipment as needed  - Ensure adequate protection for wounds/incisions during mobilization  - Obtain PT/OT consults as needed  - Advance activity as appropriate  - Communicate ordered activity level and limitations with patient/family  Outcome: Progressing  Goal: Maintain proper alignment of affected body part  Description: INTERVENTIONS:  - Support and protect limb and body alignment per provider's orders  - Instruct and reinforce with patient and family use of appropriate assistive device and precautions (e.g. spinal or hip dislocation precautions)  Outcome: Progressing     Problem: NEUROLOGICAL - ADULT  Goal: Achieves stable or improved neurological status  Description: INTERVENTIONS  - Assess for and report changes in neurological status  - Initiate measures to prevent increased intracranial pressure  - Maintain blood pressure and fluid volume within ordered parameters to optimize cerebral perfusion and minimize risk of hemorrhage  - Monitor temperature, glucose, and sodium. Initiate appropriate interventions as ordered  Outcome: Progressing     Problem: PAIN - ADULT  Goal: Verbalizes/displays adequate comfort level or patient's stated pain goal  Description: INTERVENTIONS:  - Encourage pt to monitor pain and request assistance  - Assess pain using appropriate pain scale  -  Administer analgesics based on type and severity of pain and evaluate response  - Implement non-pharmacological measures as appropriate and evaluate response  - Consider cultural and social influences on pain and pain management  - Manage/alleviate anxiety  - Utilize distraction and/or relaxation techniques  - Monitor for opioid side effects  - Notify MD/LIP if interventions unsuccessful or patient reports new pain  - Anticipate increased pain with activity and pre-medicate as appropriate  Outcome: Progressing     Problem: Safety Risk - Non-Violent Restraints  Goal: Patient will remain free from self-harm  Description: INTERVENTIONS:  - Apply the least restrictive restraint to prevent harm  - Notify patient and family of reasons restraints applied  - Assess for any contributing factors to confusion (electrolyte disturbances, delirium, medications)  - Discontinue any unnecessary medical devices as soon as possible  - Assess the patient's physical comfort, circulation, skin condition, hydration, nutrition and elimination needs   - Reorient and redirection as needed  - Assess for the need to continue restraints  12/2/2024 0638 by Christopher Mars, RN  Outcome: Not Progressing  12/1/2024 2226 by Christopher Mars, RN  Outcome: Not Progressing

## 2024-12-02 NOTE — RESPIRATORY THERAPY NOTE
Suction provided, bilateral bs, moderate thick secretions noted, no acute events or changes made overnight, RT will continue to monitor.           12/02/24 8690   Vent Information   Interface Invasive   Vent Type    Vent plugged into main power? Yes   Vent Mode VC+   Settings   FiO2 (%) 40 %   Resp Rate (Set) 18   Vt (Set, mL) 550 mL   Waveform Decelerating ramp   PEEP/CPAP (cm H2O) 5 cm H20   Insp Time (sec) 0.7 sec   Trigger Sensitivity Flow (L/min) 2 L/min   Humidification Heater   H2O Bag Level 1/4 Full   Heater Temperature 98.6 °F (37 °C)   Readings   Total    Minute Ventilation (L/min) 8.9 L/min   Expiratory Tidal Volume 550 mL   PIP Observed (cm H2O) 37 cm H2O   MAP (cm H2O) 13   I/E Ratio 1:3.8   Plateau Pressure (cm H2O) 28 cm H2O   Static Compliance (L/cm H2O) 24   Dynamic Compliance (L/cm H2O) 37 L/cm H2O

## 2024-12-02 NOTE — PROGRESS NOTES
Pulmonary/ICU/Critical Care Progress Note        Reason for Consultation: post op care  Referring Physician: Dr. Gregg    Seen this am.     SUBJECTIVE:  Afebrile still has sign secretions  Failed SBT yesterday      ALLERGIES:  Allergies[1]      MEDS:  Home Medications:  Medications Taking[2]    Scheduled Medication:   furosemide  40 mg Intravenous BID (Diuretic)    hydrALAZINE  150 mg Oral Q8H GABRIEL    cloNIDine  1 patch Transdermal Weekly    isosorbide dinitrate  20 mg Per NG Tube TID (Nitrates)    insulin regular human  1-5 Units Subcutaneous 4 times per day    carvedilol  6.25 mg Oral BID with meals    amLODIPine  10 mg Oral Daily    [Held by provider] losartan  50 mg Oral Daily    sodium chloride  3 mL Nebulization 4 times per day    albuterol  2.5 mg Nebulization 4 times per day    heparin  5,000 Units Subcutaneous Q8H GABRIEL    chlorhexidine gluconate  15 mL Mouth/Throat BID    famotidine  20 mg Oral Daily    Or    famotidine  20 mg Intravenous Daily    aspirin  81 mg Oral Daily     Continuous Infusing Medication:   labetalol (Trandate) 400 mg in sodium chloride 0.9% 200 mL infusion 1 mg/min (12/02/24 0930)    dextrose 10%      dexmedetomidine 1.2 mcg/kg/hr (12/02/24 0950)    norepinephrine Stopped (11/23/24 2225)    propofol 50 mcg/kg/min (12/02/24 1000)     PRN Medications:    dextrose 10%    lipase-protease-amylase (Lip-Prot-Amyl) **AND** sodium bicarbonate    ipratropium-albuterol    HYDROcodone-acetaminophen    melatonin    polyethylene glycol (PEG 3350)    bisacodyl    ondansetron    norepinephrine    potassium chloride **OR** potassium chloride    magnesium sulfate in dextrose 5%    magnesium sulfate in sterile water for injection    morphINE **OR** [DISCONTINUED] morphINE    fentaNYL       PHYSICAL EXAM:  /55 (BP Location: Right arm)   Pulse 74   Temp 98 °F (36.7 °C) (Temporal)   Resp 18   Ht 5' 5\" (1.651 m)   Wt 288 lb 5.8 oz (130.8 kg)   SpO2 99%   BMI 47.99 kg/m²   Vent Mode: VC+  FiO2 (%):   [40 %] 40 %  S RR:  [18] 18  S VT:  [550 mL] 550 mL  PEEP/CPAP (cm H2O):  [5 cm H20] 5 cm H20  MAP (cm H2O):  [12-13] 12    CONSTITUTIONAL: intubated, sedated  HEENT: atraumatic normocephalic  MOUTH: ETT, OGT  NECK/THROAT: no JVD. Trachea midline. No obvious thyromegaly  LUNG: vented upper clear BS b/l no wheezing, + crackles. Diminished at bases. Chest symmetric with respiratory motion. + midsternal surgical wound healing   HEART: regular rate and rhythm, no obvious murmers or gallops noted  ABD: soft non tender. + bowel sounds. No organomegaly noted  EXT: no clubbing, cyanosis, or edema noted. Pulses intact grossly  NEURO/MUSCULOSKELETAL: intubated sedated   SKIN: warm, dry. No obvious lesions noted. Has keloid under right breast   LYMPH: no obvious LAD      IMAGES:   CXR 12/2/24  FINDINGS:   POSITION: The patient is semi-erect and slightly rotated to the right.   DEVICES: There is an endotracheal tube terminating approximately 3.9 cm above the jennyfer.  A large-bore right internal jugular central venous vascular access sheath has tip projecting in the SVC. An enteric tube extends caudally beyond the field of view.   CARDIAC/VASC: The cardiomediastinal silhouette is accentuated by AP technique, but likely stably enlarged. There is mild tortuosity of the thoracic aorta with peripheral atherosclerotic vascular calcification. The pulmonary vascularity is within normal   limits.   MEDIAST/HAMLET: Surgical clips are present.   LUNGS/PLEURA: Elevation right hemidiaphragm is noted. Multifocal patchy airspace consolidation is demonstrated, slightly worse on the right side than left. Mild interstitial opacities are seen. Additional scattered reticular opacities may be atelectatic   in nature. No large pleural effusion or pneumothorax is evident.    BONES: Median sternotomy wires are demonstrated. Mild degenerative changes of the thoracic spine are apparent. There is no fracture or visible bony lesion.   OTHER: There may be  surgical clips at the level of the thoracic inlet. Leads overlie the chest and obscure underlying detail     CXR 11/27/24  Moderate pulmonary edema, progressed since 11/26/2024.     CT c/a/p  CONCLUSION:  Low-lying ETT, 0.8 cm above the jennyfer   Multifocal bilateral pulmonary nodular consolidation s    CXR 11/24/24  FINDINGS:   CARDIAC/VASC: The cardiac silhouette is exaggerated by AP portable technique. There is stable central pulmonary venous congestion.   MEDIAST/HAMLET:   No visible mass or adenopathy.   LUNGS/PLEURA: There are stable streaky opacities at the right lung base.  No pleural effusion or pneumothorax.   BONES: Sternotomy changes are again noted.   OTHER: An endotracheal tube tip projects 3.8 cm above the jennyfer.  An enteric tube again courses into the left upper quadrant.  A right internal jugular approach Brooklyn-Dev catheter tip again projects over the pulmonary outflow tract.  A mediastinal drain tip again projects over the right suprahilar region.     CXR 11/23/24  On my read possible RML infiltrates  CONCLUSION: Post emergent acute type A aortic dissection repair.  Stable cardiomegaly.  Gross stable/satisfactory position of lines and tubes.  Mild pulmonary vascular congestion.       CXR 11/22/24  CONCLUSION: Post feeding tube placement with tip in the distal esophagus approximately 4 cm above the gastroesophageal junction.  Recommend advancement of the tube by approximately 10 cm to place it in the stomach.     CXR 11/22/24  CARDIAC/MEDIASTINUM: The cardiac silhouette is enlarged and unchanged.. There is a right internal jugular Brooklyn-Dev catheter with tip at the level of the main pulmonary artery. There is a right-sided chest tube. Endotracheal tube with tip approximately    5.3 cm above the jennyfer. There is a nasogastric tube with tip and sidehole within the stomach and below the diaphragm. There are median sternotomy wires and postoperative changes of ascending aortic repair.   LUNGS: There is  pulmonary vascular redistribution without edema. Minimal blunting of the bilateral costophrenic angles may be secondary to trace pleural effusions versus chronic pleural thickening. There is mild bibasilar atelectasis. No pneumothorax.   BONES: There is degenerative disease of the thoracic spine.     Chest CTA 11/22/24  CONCLUSION:   1. Type a aortic dissection beginning just above right renal (correction:  Right coronary)  artery and extending distally into the left common iliac artery and external iliac.  Findings were called to Dr. Echavarria at 5:52 p.m.       LABS:  Recent Labs   Lab 11/30/24  0457 12/01/24  0510 12/02/24  0432   RBC 3.16* 2.78* 3.09*   HGB 8.8* 7.7* 8.3*   HCT 26.3* 23.0* 25.1*   MCV 83.2 82.7 81.2   MCH 27.8 27.7 26.9   MCHC 33.5 33.5 33.1   RDW 16.2* 16.2* 16.3*   NEPRELIM 7.59 7.40 7.08   WBC 12.2* 11.9* 10.4   .0 168.0 211.0       Recent Labs   Lab 11/28/24  0409 11/28/24  0846 11/30/24  0457 11/30/24  0825 12/01/24  0510 12/02/24  0434 12/02/24  0636   *  131*   < > 157* 130* 143* 140*  --    BUN 26*  26*   < > 30* 29* 31*  --  29*   CREATSERUM 1.45*  1.45*   < > 1.41* 1.35* 1.39* 1.46*  --    EGFRCR 41*  41*   < > 42* 45* 43* 41*  --    CA 8.9  8.9   < > 9.5 9.2 9.0 9.1  --    ALB 3.6  --  3.5  --   --  3.6  --      143   < > 142 143 143 140  --    K 4.0  4.0   < > 4.6 4.6 4.1  --  4.6     111   < > 113* 114* 114* 110  --    CO2 23.0  23.0   < > 20.0* 21.0 20.0* 20.0*  --     < > = values in this interval not displayed.       ASSESSMENT/PLAN:  Type A aortic dissection s/p repair now intubated in ICU  -on labetolol gtt as per CV surgery  -chest tube management as per CV    Post op acute respiratory failure  -complicated by extubation and reimergent intubation and significant emesis concerning for aspiration PNA vs pneumonitis  -started on zosyn-completed 10 day course now  -checked ETT asp  -failed SBT yesterday d/t increased RR, work of breathing,  hypertension, hypoxemia, significant thick secretions  -has a + cuff leak per RT  -perform SBT again this am. If fails, will need a tracheostomy by ENT and PEG with GI/IR  -hx of likely TANYA and previous smoking hx. Has sign dense consolidations and atelectasis on chest CT. Would try BIPAP/AVAPS if able to extubate and monitor closely  -PRN ABG and CXR  -saline nebs    Previous hx of smoking and ETOH  -continue duonebs PRN    NAIMA on CKD  -likely from surgery  -monitor UO and renal labs both are improving   -renal following     BG  -insulin and accuchecks    Abd pain  -s/p abd CT as above  -resolved    Proph:  -DVT: hep sq    Dispo:  -full code    Critical care time 34 min independent of procedures      Thank you for the opportunity to care for Alisha Wilde.      SIDDHARTH Rainey DO, MPH  Pulmonary Critical Care Medicine  [facility]                       [1]   Allergies  Allergen Reactions    Atorvastatin MYALGIA    Pravastatin MYALGIA    Rosuvastatin MYALGIA    Seasonal Runny nose   [2]   Outpatient Medications Marked as Taking for the 11/21/24 encounter (Hospital Encounter)   Medication Sig Dispense Refill    Acetaminophen ER (TYLENOL 8 HOUR) 650 MG Oral Tab CR Take 2 tablets (1,300 mg total) by mouth daily as needed.      Calcium Carb-Cholecalciferol 600-10 MG-MCG Oral Tab Take 1 tablet by mouth daily.      metoprolol succinate ER 50 MG Oral Tablet 24 Hr Take 1 tablet (50 mg total) by mouth daily. 90 tablet 3    Losartan Potassium-HCTZ 100-12.5 MG Oral Tab Take 1 tablet by mouth daily. 90 tablet 3    Potassium Chloride ER 20 MEQ Oral Tab CR Take 1 tablet by mouth daily. 90 tablet 3    albuterol 108 (90 Base) MCG/ACT Inhalation Aero Soln Inhale 2 puffs into the lungs every 4 (four) hours as needed for Wheezing. 3 each 3    amLODIPine 10 MG Oral Tab Take 1 tablet (10 mg total) by mouth daily. 90 tablet 3    Ascorbic Acid (VITAMIN C) 1000 MG Oral Tab Take 1 tablet (1,000 mg  total) by mouth daily.      vitamin B-12 50 MCG Oral Tab Take 1 tablet (50 mcg total) by mouth daily.

## 2024-12-02 NOTE — PROGRESS NOTES
12/02/24 0900   Wound 12/01/24 Breast Right   Date First Assessed/Time First Assessed: 12/01/24 1600   Present on Original Admission: No  Location: (c) Breast  Wound Location Orientation: Right  Wound Description (Comments): (c) OPEN KELOID   Wound Image   (IMAGE DID NOT SAVE)   Site Assessment Clean;Fragile;Moist   Closure Approximated   Drainage Amount Scant   Drainage Description Serous   Treatments Topical (Barrier/Moisturizer/Ointment)   Dressing 2x2s   Dressing Changed Changed   Dressing Status Dressing Changed;Removed;Old drainage   Wound Depth (cm) 0.1 cm   Non-staged Wound Description Partial thickness   Pamela-wound Assessment Clean;Dry;Intact   Wound Granulation Tissue Red   State of Healing Fully granulated   Wound Odor None   Wound Follow Up   Follow up needed No     Rn consulted wound care for weeping right breast keloid. There is a tiny open area, scant drainage noted. Recommend daily cleansing and application of zinc for moisture, cover w dry gauze. Spoke to bedside RN. Wound care signing off.

## 2024-12-02 NOTE — RESPIRATORY THERAPY NOTE
Patient's Problems: Type A aortic dissection s/p repair now intubated.    Respiratory Assessment :   Intubated patient is on ventilator/ full support.   Breath Sounds: were coarse and Rhonchi bilateral.   Secretions: large  white and thick secretions.   Patient was suctioned as needed.       Ventilator Settings:  VC+   RR 18 ,  , PEEP + 5, FIO2 40%      Airway: ETT 7.5  at 26 Lip    Day of intubation: 11/22/24       Plan of Care:  Plan to do SBT after A-Line placement.  Patient needs to continue on ventilator. Patient's respiratory status is stable. Patient is tolerating ventilator setting  well. Patient was suction as need it, RT to continue to monitor this patient.

## 2024-12-02 NOTE — PROGRESS NOTES
Updated 3 family members via telephone calls as well as 2 cousins at the bedside today on care plan goals over the next 24 hours;

## 2024-12-02 NOTE — RESPIRATORY THERAPY NOTE
Arterial Line     Date/Time: 12/02/2024     Performed by: Ema Nance RCP     General Information and Staff     Procedure Start: 15:45  Procedure End:  16:15  Performed By:  Respiratory  Patient Location:  PCU #[id number]  Indication: Continuous blood pressure monitoring and blood sampling needed  Site Identification: Real time ultrasound guided and image stored and retrievable     Procedure Details     Catheter Size:  20 G  Catheter Length:  1 and 3/4 inch  Catheter Type:  Arrow  Site:  Right radial  Site Prep: Chlorhexidine    Line Secured:  Tape, Tegaderm and Wrist Brace     Assessment     Events: Patient tolerated procedure well with no complications

## 2024-12-02 NOTE — PROGRESS NOTES
[facility]  Nephrology Daily Progress Note    Alisha Wilde  [id number]  59 year old      HPI:   Alisha Wilde is a 59 year old female.  Intubated and sedated.  Fio2 40%.  Toleratine tube feedings.       ROS:     Unable.     PHYSICAL EXAM:   Temp:  [97.2 °F (36.2 °C)-98.3 °F (36.8 °C)] 98 °F (36.7 °C)  Pulse:  [68-74] 74  Resp:  [4-22] 18  BP: (113-161)/(55-75) 130/55  SpO2:  [95 %-99 %] 99 %  AO: (116-182)/() 128/53  FiO2 (%):  [40 %] 40 %  Patient Weight for the past 72 hrs:   Weight   11/30/24 0300 268 lb 8.3 oz (121.8 kg)   12/01/24 0600 288 lb 5.8 oz (130.8 kg)       Constitutional: appears well hydrated alert and responsive no acute distress noted  Neck/Thyroid: neck is supple without adenopathy  Lymphatic: no abnormal cervical, supraclavicular or axillary adenopathy is noted  Respiratory: normal to inspection lungs are clear to auscultation bilaterally normal respiratory effort  Cardiovascular: regular rate and rhythm no murmurs, gallups, or rubs  Abdomen: soft non-tender non-distended no organomegaly noted no masses  Musculoskeletal: full ROM all extremities good strength  no deformities  Extremities: no edema, cyanosis, or clubbing  Neurological: exam appropriate for age reflexes and motor skills appropriate for age    Labs:  Lab Results   Component Value Date    WBC 10.4 12/02/2024    HGB 8.3 12/02/2024    HCT 25.1 12/02/2024    .0 12/02/2024    CREATSERUM 1.46 12/02/2024    BUN 29 12/02/2024     12/02/2024    K 4.6 12/02/2024     12/02/2024    CO2 20.0 12/02/2024     12/02/2024    CA 9.1 12/02/2024    ALB 3.6 12/02/2024    MG 2.2 12/02/2024    PHOS 5.2 12/02/2024     Recent Labs   Lab 11/30/24  0457 12/01/24  0510 12/02/24  0432   WBC 12.2* 11.9* 10.4   HGB 8.8* 7.7* 8.3*   HCT 26.3* 23.0* 25.1*   .0 168.0 211.0     Recent Labs   Lab 11/28/24  0409 11/28/24  0846 11/30/24  0457 11/30/24  0825 12/01/24  0510 12/02/24  0434 12/02/24  0636   *   131*   < > 157* 130* 143* 140*  --    BUN 26*  26*   < > 30* 29* 31*  --  29*   CREATSERUM 1.45*  1.45*   < > 1.41* 1.35* 1.39* 1.46*  --    CA 8.9  8.9   < > 9.5 9.2 9.0 9.1  --    ALB 3.6  --  3.5  --   --  3.6  --      143   < > 142 143 143 140  --    K 4.0  4.0   < > 4.6 4.6 4.1  --  4.6     111   < > 113* 114* 114* 110  --    CO2 23.0  23.0   < > 20.0* 21.0 20.0* 20.0*  --    PHOS 3.6  --  3.9  --   --  5.2*  --     < > = values in this interval not displayed.       Imaging  XR CHEST AP PORTABLE  (CPT=71045)    Result Date: 12/2/2024  CONCLUSION:  1. Redemonstration of multifocal airspace disease, perhaps indicative of infectious process or developing pulmonary alveolar edema. Follow-up is advised to document resolution.  2. Additional support tubes and lines as above.    elm-remote.   Dictated by (CST): Eulalio Shaikh MD on 12/02/2024 at 7:57 AM     Finalized by (CST): Eulalio Shaikh MD on 12/02/2024 at 8:00 AM          XR CHEST AP PORTABLE  (CPT=71045)    Result Date: 12/1/2024  CONCLUSION:  1. Multifocal patchy opacities throughout the right greater than left lungs are similar in comparison to previous day chest radiograph. 2. Stable radiographic appearance of support apparatus.    Dictated by (CST): Evaristo Rosado MD on 12/01/2024 at 9:04 AM     Finalized by (CST): Evaristo Rosado MD on 12/01/2024 at 9:06 AM             Medications:    Current Facility-Administered Medications:     labetalol (Trandate) 400 mg in sodium chloride 0.9% 200 mL infusion, 1 mg/min, Intravenous, Continuous    [START ON 12/3/2024] furosemide (Lasix) 10 mg/mL injection 40 mg, 40 mg, Intravenous, Daily    hydrALAZINE (Apresoline) tab 150 mg, 150 mg, Oral, Q8H GABRIEL    cloNIDine (Catapres) 0.2 MG/24HR patch 1 patch, 1 patch, Transdermal, Weekly    isosorbide dinitrate (Isordil) tab 20 mg, 20 mg, Per NG Tube, TID (Nitrates)    insulin regular human (Novolin R, Humulin R) 100 UNIT/ML injection 1-5 Units, 1-5 Units,  Subcutaneous, 4 times per day    carvedilol (Coreg) tab 6.25 mg, 6.25 mg, Oral, BID with meals    amLODIPine (Norvasc) tab 10 mg, 10 mg, Oral, Daily    [Held by provider] losartan (Cozaar) tab 50 mg, 50 mg, Oral, Daily    dextrose 10% infusion (TPN no rate), , Intravenous, Continuous PRN    pancrelipase (Lip-Prot-Amyl) (Zenpep) DR particles cap 10,000 Units, 10,000 Units, Per G Tube, PRN **AND** sodium bicarbonate tab 325 mg, 325 mg, Oral, PRN    sodium chloride 3 % nebulizer solution 3 mL, 3 mL, Nebulization, 4 times per day    ipratropium-albuterol (Duoneb) 0.5-2.5 (3) MG/3ML inhalation solution 3 mL, 3 mL, Nebulization, Q6H PRN    albuterol (Ventolin) (2.5 MG/3ML) 0.083% nebulizer solution 2.5 mg, 2.5 mg, Nebulization, 4 times per day    HYDROcodone-acetaminophen (Norco) 5-325 MG per tab 2 tablet, 2 tablet, Oral, Q4H PRN    heparin (Porcine) 5000 UNIT/ML injection 5,000 Units, 5,000 Units, Subcutaneous, Q8H GABRIEL    melatonin tab 3 mg, 3 mg, Oral, Nightly PRN    polyethylene glycol (PEG 3350) (Miralax) 17 g oral packet 17 g, 17 g, Oral, Daily PRN    bisacodyl (Dulcolax) 10 MG rectal suppository 10 mg, 10 mg, Rectal, Daily PRN    ondansetron (Zofran) 4 MG/2ML injection 4 mg, 4 mg, Intravenous, Q6H PRN    dexmedeTOMIDine in sodium chloride 0.9% (Precedex) 400 mcg/100mL infusion premix, 0.2-1.5 mcg/kg/hr (Dosing Weight), Intravenous, Continuous    norepinephrine (Levophed) 4 mg/250mL infusion premix, 0.5-30 mcg/min, Intravenous, Continuous PRN    potassium chloride 20 mEq/100mL IVPB premix 20 mEq, 20 mEq, Intravenous, PRN **OR** potassium chloride 40 mEq/100mL IVPB premix (central line) 40 mEq, 40 mEq, Intravenous, PRN    magnesium sulfate in dextrose 5% 1 g/100mL infusion premix 1 g, 1 g, Intravenous, PRN    magnesium sulfate in sterile water for injection 2 g/50mL IVPB premix 2 g, 2 g, Intravenous, PRN    chlorhexidine gluconate (Peridex) 0.12 % oral solution 15 mL, 15 mL, Mouth/Throat, BID    morphINE PF 2 MG/ML  injection 2 mg, 2 mg, Intravenous, Q2H PRN **OR** [DISCONTINUED] morphINE PF 4 MG/ML injection 4 mg, 4 mg, Intravenous, Q2H PRN    propofol (Diprivan) 10 mg/mL infusion premix, 5-50 mcg/kg/min (Dosing Weight), Intravenous, Continuous    fentaNYL (Sublimaze) 50 mcg/mL injection 25 mcg, 25 mcg, Intravenous, Q3H PRN    famotidine (Pepcid) tab 20 mg, 20 mg, Oral, Daily **OR** famotidine (Pepcid) 20 mg/2mL injection 20 mg, 20 mg, Intravenous, Daily    aspirin chewable tab 81 mg, 81 mg, Oral, Daily    Allergies:  Allergies[1]    Input/Output:    Intake/Output Summary (Last 24 hours) at 12/2/2024 1158  Last data filed at 12/2/2024 0605  Gross per 24 hour   Intake 4196.41 ml   Output 3735 ml   Net 461.41 ml          ASSESSMENT/PLAN:   Assessment   Patient Active Problem List   Diagnosis    Hypercholesteremia    Alcoholism (HCC)    Essential hypertension    Vitamin D deficiency    Adjustment disorder with mixed anxiety and depressed mood    Controlled type 2 diabetes mellitus without complication, without long-term current use of insulin (HCC)    Obesity (BMI 30-39.9)    Neck mass    Polyp of colon    Flat feet, bilateral    Hypokalemia    Myalgia due to statin    Age-related nuclear cataract of both eyes    Positive for microalbuminuria    Dissection of ascending aorta (HCC)    NAIMA (acute kidney injury) (HCC)    Stage 3 chronic kidney disease (HCC)    Acute respiratory failure with hypoxia (HCC)       UO 5410 ml.  Creatinine creeping up to 1.46.  Decrease Lasix to 40 mg every day.  If she fails breathing trial will need trach.  Discussed with RN.              12/2/2024  Juan Franco MD               [1]   Allergies  Allergen Reactions    Atorvastatin MYALGIA    Pravastatin MYALGIA    Rosuvastatin MYALGIA    Seasonal Runny nose

## 2024-12-02 NOTE — PROGRESS NOTES
[facility]  part of [facility]    Progress Note    Alisha Wilde Patient Status:  Inpatient    10/25/1965 MRN [id number]   Location [facility] 2W/SW Attending Hayde Brito MD   Hosp Day # 10 PCP Bridger Avendano MD     Chief Complaint:   Chief Complaint   Patient presents with    Chest Pain Angina       Subjective:   Alisha Wilde is intubated and sedated. Did not attempt SBT today as failed over multiple days over the weeknd.   Uop 5410mL.   Objective:   Objective:    Blood pressure 130/55, pulse 74, temperature 98 °F (36.7 °C), temperature source Temporal, resp. rate 18, height 5' 5\" (1.651 m), weight 288 lb 5.8 oz (130.8 kg), SpO2 99%, not currently breastfeeding.    Physical Exam:    General: No acute distress. Intubated.   Respiratory: Diminished bases   Cardiovascular: S1, S2. Regular rate and rhythm. No murmurs, rubs or gallops.   Abdomen: Soft, nontender, distended.  Positive bowel sounds. No rebound or guarding.  Extremities: 2+ pitting edema x 4 ext    Results:   Results:    Labs:  Recent Labs   Lab 24  0409 24  1307 24  0457 24  0510 24  0432   WBC 9.8 9.4 12.2* 11.9* 10.4   HGB 7.8* 8.6* 8.8* 7.7* 8.3*   MCV 83.6 81.9 83.2 82.7 81.2   .0 194.0 169.0 168.0 211.0   BAND  --   --  5 3 10       Recent Labs   Lab 24  0409 24  0846 24  0457 24  0825 24  0510 24  0434 24  0636   *  131*   < > 157* 130* 143* 140*  --    BUN 26*  26*   < > 30* 29* 31*  --  29*   CREATSERUM 1.45*  1.45*   < > 1.41* 1.35* 1.39* 1.46*  --    CA 8.9  8.9   < > 9.5 9.2 9.0 9.1  --    ALB 3.6  --  3.5  --   --  3.6  --      143   < > 142 143 143 140  --    K 4.0  4.0   < > 4.6 4.6 4.1  --  4.6     111   < > 113* 114* 114* 110  --    CO2 23.0  23.0   < > 20.0* 21.0 20.0* 20.0*  --     < > = values in this interval not displayed.       Estimated Creatinine Clearance: 36.4 mL/min (A) (based on SCr of  1.46 mg/dL (H)).    No results for input(s): \"PTP\", \"INR\" in the last 168 hours.           Culture:  No results found for this visit on 11/21/24.    Cardiac  No results for input(s): \"TROP\", \"PBNP\" in the last 168 hours.        Imaging: Imaging data reviewed in [facility].  XR CHEST AP PORTABLE  (CPT=71045)    Result Date: 12/2/2024  CONCLUSION:  1. Redemonstration of multifocal airspace disease, perhaps indicative of infectious process or developing pulmonary alveolar edema. Follow-up is advised to document resolution.  2. Additional support tubes and lines as above.    elm-remote.   Dictated by (CST): Eulalio Shaikh MD on 12/02/2024 at 7:57 AM     Finalized by (CST): Eulalio Shaikh MD on 12/02/2024 at 8:00 AM          XR CHEST AP PORTABLE  (CPT=71045)    Result Date: 12/1/2024  CONCLUSION:  1. Multifocal patchy opacities throughout the right greater than left lungs are similar in comparison to previous day chest radiograph. 2. Stable radiographic appearance of support apparatus.    Dictated by (CST): Evaristo Rosado MD on 12/01/2024 at 9:04 AM     Finalized by (CST): Evaristo Rosado MD on 12/01/2024 at 9:06 AM           Medications:    [START ON 12/3/2024] furosemide  40 mg Intravenous Daily    hydrALAZINE  150 mg Oral Q8H [facility]    cloNIDine  1 patch Transdermal Weekly    isosorbide dinitrate  20 mg Per NG Tube TID (Nitrates)    insulin regular human  1-5 Units Subcutaneous 4 times per day    carvedilol  6.25 mg Oral BID with meals    amLODIPine  10 mg Oral Daily    [Held by provider] losartan  50 mg Oral Daily    sodium chloride  3 mL Nebulization 4 times per day    albuterol  2.5 mg Nebulization 4 times per day    heparin  5,000 Units Subcutaneous Q8H GABRIEL    chlorhexidine gluconate  15 mL Mouth/Throat BID    famotidine  20 mg Oral Daily    Or    famotidine  20 mg Intravenous Daily    aspirin  81 mg Oral Daily         Assessment and Plan:   Assessment & Plan:      Type A aortic dissection   S/p emergent repair 11/22/24    Off NTG gtt now on labetalol gtt  CV surgery, cards and critical care on consult.   TTE LVEF 75-80%.   Cont BP control.   Acute hypoxic respiratory failure   Aspiration event   Completed 10 days of zosyn.   Sputum cx candida   Failed SBT. Will need to consider trach and PEG  ENT/GI consult.   Pulmonary on consult.   Cont lasix decrease to daily.   CXR with multifocal airspace dz  Albuterol nebs   H/o tobacco and ETOH abuse   Duonebs PRN   NAIMA on CKD III   Renal on consult.   [place] to be secondary to MARY LOU/ATN   BUN/creat trending up. Decrease lasix   Essential HTN   Coreg 6.25mg BID, norvasc 10mg daily, hydralazine 100mg TID. Added another clonidine patch 0.2mg daily   Cont Lasix 40mg IV BID  ARB on hold due to NAIMA.   Off NTG gtt. Now on labetalol gtt.   Other medical problems  Morbid Obesity   TANYA     >55min spent, >50% spent counseling and coordinating care in the form of educating pt/family and d/w consultants and staff. Most of the time spent discussing the above plan.        Plan of care discussed with patient or family at bedside.    Hayde Brito MD  Hospitalist          Supplementary Documentation:     Quality:  DVT Prophylaxis: heparin   CODE status: Full  Dispo: per clinical course            Estimated date of discharge: TBD  Discharge is dependent on: clinical stability  At this point Ms. Wilde is expected to be discharge to: TBD

## 2024-12-02 NOTE — PROGRESS NOTES
Received intubated pt on the following vent settings:   12/01/24 0717   Vent Information   Vent Mode VC+   Settings   FiO2 (%) 40 %   Resp Rate (Set) 18   Vt (Set, mL) 550 mL   Waveform Decelerating ramp   PEEP/CPAP (cm H2O) 5 cm H20     Pt placed on SBT this AM on 5/5, 40%. After 15mins on SBT pt became hypertensive (systolic into 200s), tachypneic (RR >35), & had increased WOB. Pt subsequently placed back on full vent support. Nebs given as scheduled. Large amount of secretions suctioned throughout shift. ETT remains secured at 26cm at the lip. RT to continue to monitor.

## 2024-12-03 ENCOUNTER — ANESTHESIA EVENT (OUTPATIENT)
Dept: SURGERY | Facility: HOSPITAL | Age: 59
End: 2024-12-03
Payer: COMMERCIAL

## 2024-12-03 DIAGNOSIS — J96.10 CHRONIC RESPIRATORY FAILURE, UNSPECIFIED WHETHER WITH HYPOXIA OR HYPERCAPNIA (HCC): Primary | ICD-10-CM

## 2024-12-03 LAB
ALBUMIN SERPL-MCNC: 3.4 G/DL (ref 3.2–4.8)
ALBUMIN/GLOB SERPL: 1.2 {RATIO} (ref 1–2)
ALP LIVER SERPL-CCNC: 86 U/L
ALT SERPL-CCNC: 32 U/L
ANION GAP SERPL CALC-SCNC: 8 MMOL/L (ref 0–18)
ANION GAP SERPL CALC-SCNC: 8 MMOL/L (ref 0–18)
AST SERPL-CCNC: 25 U/L (ref ?–34)
BASE EXCESS BLD CALC-SCNC: -7 MMOL/L (ref ?–2)
BASOPHILS # BLD: 0 X10(3) UL (ref 0–0.2)
BASOPHILS NFR BLD: 0 %
BILIRUB SERPL-MCNC: 0.3 MG/DL (ref 0.2–1.1)
BUN BLD-MCNC: 33 MG/DL (ref 9–23)
BUN BLD-MCNC: 33 MG/DL (ref 9–23)
BUN/CREAT SERPL: 20.6 (ref 10–20)
BUN/CREAT SERPL: 20.6 (ref 10–20)
CA-I BLD-SCNC: 1.2 MMOL/L (ref 0.95–1.32)
CALCIUM BLD-MCNC: 8.9 MG/DL (ref 8.7–10.4)
CALCIUM BLD-MCNC: 8.9 MG/DL (ref 8.7–10.4)
CHLORIDE SERPL-SCNC: 113 MMOL/L (ref 98–112)
CHLORIDE SERPL-SCNC: 113 MMOL/L (ref 98–112)
CO2 SERPL-SCNC: 19 MMOL/L (ref 21–32)
CO2 SERPL-SCNC: 19 MMOL/L (ref 21–32)
COHGB MFR BLD: 2.4 % (ref 0–3)
CREAT BLD-MCNC: 1.6 MG/DL
CREAT BLD-MCNC: 1.6 MG/DL
DEPRECATED RDW RBC AUTO: 50.4 FL (ref 35.1–46.3)
EGFRCR SERPLBLD CKD-EPI 2021: 36 ML/MIN/1.73M2 (ref 60–?)
EGFRCR SERPLBLD CKD-EPI 2021: 36 ML/MIN/1.73M2 (ref 60–?)
EOSINOPHIL # BLD: 0.12 X10(3) UL (ref 0–0.7)
EOSINOPHIL NFR BLD: 1 %
ERYTHROCYTE [DISTWIDTH] IN BLOOD BY AUTOMATED COUNT: 16.5 % (ref 11–15)
GLOBULIN PLAS-MCNC: 2.9 G/DL (ref 2–3.5)
GLUCOSE BLD-MCNC: 130 MG/DL (ref 70–99)
GLUCOSE BLD-MCNC: 130 MG/DL (ref 70–99)
GLUCOSE BLDC GLUCOMTR-MCNC: 115 MG/DL (ref 70–99)
GLUCOSE BLDC GLUCOMTR-MCNC: 128 MG/DL (ref 70–99)
GLUCOSE BLDC GLUCOMTR-MCNC: 137 MG/DL (ref 70–99)
GLUCOSE BLDC GLUCOMTR-MCNC: 137 MG/DL (ref 70–99)
HCO3 BLDA-SCNC: 19.4 MEQ/L (ref 21–27)
HCT VFR BLD AUTO: 25.3 %
HGB BLD-MCNC: 10 G/DL
HGB BLD-MCNC: 8 G/DL
IRON SATN MFR SERPL: 9 %
IRON SERPL-MCNC: 19 UG/DL
LACTATE BLD-SCNC: 0.9 MMOL/L (ref 0.5–2)
LYMPHOCYTES NFR BLD: 1.27 X10(3) UL (ref 1–4)
LYMPHOCYTES NFR BLD: 11 %
MAGNESIUM SERPL-MCNC: 2.3 MG/DL (ref 1.6–2.6)
MCH RBC QN AUTO: 26.5 PG (ref 26–34)
MCHC RBC AUTO-ENTMCNC: 31.6 G/DL (ref 31–37)
MCV RBC AUTO: 83.8 FL
METAMYELOCYTES # BLD: 0.12 X10(3) UL
METAMYELOCYTES NFR BLD: 1 %
METHGB MFR BLD: 0.6 % SAT (ref 0.4–1.5)
MONOCYTES # BLD: 0.12 X10(3) UL (ref 0.1–1)
MONOCYTES NFR BLD: 1 %
MORPHOLOGY: NORMAL
NEUTROPHILS # BLD AUTO: 7.99 X10 (3) UL (ref 1.5–7.7)
NEUTROPHILS NFR BLD: 83 %
NEUTS BAND NFR BLD: 3 %
NEUTS HYPERSEG # BLD: 9.89 X10(3) UL (ref 1.5–7.7)
O2 CT BLD-SCNC: 13 VOL% (ref 15–23)
O2/TOTAL GAS SETTING VFR VENT: 30 %
OSMOLALITY SERPL CALC.SUM OF ELEC: 299 MOSM/KG (ref 275–295)
OSMOLALITY SERPL CALC.SUM OF ELEC: 299 MOSM/KG (ref 275–295)
PCO2 BLDA: 28 MM HG (ref 35–45)
PEEP SETTING VENT: 5 CM H2O
PH BLDA: 7.39 [PH] (ref 7.35–7.45)
PHOSPHATE SERPL-MCNC: 5.9 MG/DL (ref 2.4–5.1)
PLATELET # BLD AUTO: 194 10(3)UL (ref 150–450)
PLATELET MORPHOLOGY: NORMAL
PO2 BLDA: 68 MM HG (ref 80–100)
POTASSIUM BLD-SCNC: 4.3 MMOL/L (ref 3.6–5.1)
POTASSIUM SERPL-SCNC: 4.5 MMOL/L (ref 3.5–5.1)
POTASSIUM SERPL-SCNC: 4.5 MMOL/L (ref 3.5–5.1)
PRESSURE SUPPORT SETTING VENT: 5 CM H2O
PROT SERPL-MCNC: 6.3 G/DL (ref 5.7–8.2)
PUNCTURE CHARGE: NO
RBC # BLD AUTO: 3.02 X10(6)UL
SAO2 % BLDA: 94.9 % (ref 94–100)
SODIUM BLD-SCNC: 136 MMOL/L (ref 135–145)
SODIUM SERPL-SCNC: 140 MMOL/L (ref 136–145)
SODIUM SERPL-SCNC: 140 MMOL/L (ref 136–145)
TIBC SERPL-MCNC: 213 UG/DL (ref 250–425)
TOTAL CELLS COUNTED BLD: 100
TRANSFERRIN SERPL-MCNC: 143 MG/DL (ref 250–380)
WBC # BLD AUTO: 11.5 X10(3) UL (ref 4–11)

## 2024-12-03 PROCEDURE — 99223 1ST HOSP IP/OBS HIGH 75: CPT | Performed by: INTERNAL MEDICINE

## 2024-12-03 PROCEDURE — 99233 SBSQ HOSP IP/OBS HIGH 50: CPT | Performed by: HOSPITALIST

## 2024-12-03 PROCEDURE — 99233 SBSQ HOSP IP/OBS HIGH 50: CPT | Performed by: INTERNAL MEDICINE

## 2024-12-03 PROCEDURE — 99291 CRITICAL CARE FIRST HOUR: CPT | Performed by: INTERNAL MEDICINE

## 2024-12-03 PROCEDURE — 99222 1ST HOSP IP/OBS MODERATE 55: CPT | Performed by: OTOLARYNGOLOGY

## 2024-12-03 RX ORDER — ISOSORBIDE DINITRATE 20 MG/1
40 TABLET ORAL
Status: DISCONTINUED | OUTPATIENT
Start: 2024-12-03 | End: 2024-12-23

## 2024-12-03 RX ORDER — METOCLOPRAMIDE HYDROCHLORIDE 5 MG/ML
5 INJECTION INTRAMUSCULAR; INTRAVENOUS EVERY 6 HOURS
Status: DISCONTINUED | OUTPATIENT
Start: 2024-12-03 | End: 2024-12-05

## 2024-12-03 NOTE — PROGRESS NOTES
~0300 Pt's sedation medications were paused to draw labs. Pt awakened and started \"squirming\" side to side in the bed. Her rate of breathing increased and she moved her legs side to side. When asked she did not lift her L hand. When asked if she knew she was in the hospital she moved her head \"no\". Sedation was returned to it's original levels as pt was reassured.

## 2024-12-03 NOTE — PLAN OF CARE
Problem: GASTROINTESTINAL - ADULT  Goal: Minimal or absence of nausea and vomiting  Description: INTERVENTIONS:  - Maintain adequate hydration with IV or PO as ordered and tolerated  - Nasogastric tube to low intermittent suction as ordered  - Evaluate effectiveness of ordered antiemetic medications  - Provide nonpharmacologic comfort measures as appropriate  - Advance diet as tolerated, if ordered  - Obtain nutritional consult as needed  - Evaluate fluid balance  Outcome: Progressing     Problem: SKIN/TISSUE INTEGRITY - ADULT  Goal: Incision(s), wounds(s) or drain site(s) healing without S/S of infection  Description: INTERVENTIONS:  - Assess and document risk factors for pressure ulcer development  - Assess and document skin integrity  - Assess and document dressing/incision, wound bed, drain sites and surrounding tissue  - Implement wound care per orders  - Initiate isolation precautions as appropriate  - Initiate Pressure Ulcer prevention bundle as indicated  Outcome: Progressing  Goal: Oral mucous membranes remain intact  Description: INTERVENTIONS  - Assess oral mucosa and hygiene practices  - Implement preventative oral hygiene regimen  - Implement oral medicated treatments as ordered  Outcome: Progressing     Problem: HEMATOLOGIC - ADULT  Goal: Maintains hematologic stability  Description: INTERVENTIONS  - Assess for signs and symptoms of bleeding or hemorrhage  - Monitor labs and vital signs for trends  - Administer supportive blood products/factors, fluids and medications as ordered and appropriate  - Administer supportive blood products/factors as ordered and appropriate  Outcome: Progressing  Goal: Free from bleeding injury  Description: (Example usage: patient with low platelets)  INTERVENTIONS:  - Avoid intramuscular injections, enemas and rectal medication administration  - Ensure safe mobilization of patient  - Hold pressure on venipuncture sites to achieve adequate hemostasis  - Assess for signs  and symptoms of internal bleeding  - Monitor lab trends  - Patient is to report abnormal signs of bleeding to staff  - Avoid use of toothpicks and dental floss  - Use electric shaver for shaving  - Use soft bristle tooth brush  - Limit straining and forceful nose blowing  Outcome: Progressing     Problem: MUSCULOSKELETAL - ADULT  Goal: Return mobility to safest level of function  Description: INTERVENTIONS:  - Assess patient stability and activity tolerance for standing, transferring and ambulating w/ or w/o assistive devices  - Assist with transfers and ambulation using safe patient handling equipment as needed  - Ensure adequate protection for wounds/incisions during mobilization  - Obtain PT/OT consults as needed  - Advance activity as appropriate  - Communicate ordered activity level and limitations with patient/family  Outcome: Not Progressing     Problem: NEUROLOGICAL - ADULT  Goal: Achieves stable or improved neurological status  Description: INTERVENTIONS  - Assess for and report changes in neurological status  - Initiate measures to prevent increased intracranial pressure  - Maintain blood pressure and fluid volume within ordered parameters to optimize cerebral perfusion and minimize risk of hemorrhage  - Monitor temperature, glucose, and sodium. Initiate appropriate interventions as ordered  Outcome: Not Progressing     Problem: Safety Risk - Non-Violent Restraints  Goal: Patient will remain free from self-harm  Description: INTERVENTIONS:  - Apply the least restrictive restraint to prevent harm  - Notify patient and family of reasons restraints applied  - Assess for any contributing factors to confusion (electrolyte disturbances, delirium, medications)  - Discontinue any unnecessary medical devices as soon as possible  - Assess the patient's physical comfort, circulation, skin condition, hydration, nutrition and elimination needs   - Reorient and redirection as needed  - Assess for the need to continue  restraints  Outcome: Not Progressing     Problem: PAIN - ADULT  Goal: Verbalizes/displays adequate comfort level or patient's stated pain goal  Description: INTERVENTIONS:  - Encourage pt to monitor pain and request assistance  - Assess pain using appropriate pain scale  - Administer analgesics based on type and severity of pain and evaluate response  - Implement non-pharmacological measures as appropriate and evaluate response  - Consider cultural and social influences on pain and pain management  - Manage/alleviate anxiety  - Utilize distraction and/or relaxation techniques  - Monitor for opioid side effects  - Notify MD/LIP if interventions unsuccessful or patient reports new pain  - Anticipate increased pain with activity and pre-medicate as appropriate  Outcome: Progressing      normal

## 2024-12-03 NOTE — PROGRESS NOTES
[facility]  Nephrology Daily Progress Note    Alisha Wilde  [id number]  59 year old      HPI:   Alisha Wilde is a 59 year old female.  SBT currently in progress and so far doing OK. Awake and follows commands.  On 30% FiO2.       ROS:     Unable.       PHYSICAL EXAM:   Temp:  [97.5 °F (36.4 °C)-98.5 °F (36.9 °C)] 98.5 °F (36.9 °C)  Pulse:  [74-80] 77  Resp:  [11-26] 19  BP: (112-152)/(51-98) 146/64  SpO2:  [96 %-100 %] 97 %  AO: (134-158)/(49-63) 138/51  FiO2 (%):  [30 %-40 %] 30 %  Patient Weight for the past 72 hrs:   Weight   12/01/24 0600 288 lb 5.8 oz (130.8 kg)   12/03/24 0300 285 lb 7.9 oz (129.5 kg)       Constitutional: appears well hydrated alert and responsive no acute distress noted  Neck/Thyroid: neck is supple without adenopathy  Lymphatic: no abnormal cervical, supraclavicular or axillary adenopathy is noted  Respiratory: normal to inspection lungs are clear to auscultation bilaterally normal respiratory effort  Cardiovascular: regular rate and rhythm no murmurs, gallups, or rubs  Abdomen: soft non-tender non-distended no organomegaly noted no masses  Musculoskeletal: full ROM all extremities good strength  no deformities  Extremities: no edema, cyanosis, or clubbing  Neurological: exam appropriate for age reflexes and motor skills appropriate for age    Labs:  Lab Results   Component Value Date    WBC 11.5 12/03/2024    HGB 8.0 12/03/2024    HCT 25.3 12/03/2024    .0 12/03/2024    CREATSERUM 1.60 12/03/2024    CREATSERUM 1.60 12/03/2024    BUN 33 12/03/2024    BUN 33 12/03/2024     12/03/2024     12/03/2024    K 4.5 12/03/2024    K 4.5 12/03/2024     12/03/2024     12/03/2024    CO2 19.0 12/03/2024    CO2 19.0 12/03/2024     12/03/2024     12/03/2024    CA 8.9 12/03/2024    CA 8.9 12/03/2024    ALB 3.4 12/03/2024    ALKPHO 86 12/03/2024    BILT 0.3 12/03/2024    TP 6.3 12/03/2024    AST 25 12/03/2024    ALT 32 12/03/2024    MG 2.3  12/03/2024    PHOS 5.9 12/03/2024     Recent Labs   Lab 12/01/24  0510 12/02/24 0432 12/03/24 0312   WBC 11.9* 10.4 11.5*   HGB 7.7* 8.3* 8.0*   HCT 23.0* 25.1* 25.3*   .0 211.0 194.0     Recent Labs   Lab 11/30/24  0457 11/30/24  0825 12/01/24 0510 12/02/24 0434 12/02/24  0636 12/03/24 0312   *   < > 143* 140*  --  130*  130*   BUN 30*   < > 31*  --  29* 33*  33*   CREATSERUM 1.41*   < > 1.39* 1.46*  --  1.60*  1.60*   CA 9.5   < > 9.0 9.1  --  8.9  8.9   ALB 3.5  --   --  3.6  --  3.4      < > 143 140  --  140  140   K 4.6   < > 4.1  --  4.6 4.5  4.5   *   < > 114* 110  --  113*  113*   CO2 20.0*   < > 20.0* 20.0*  --  19.0*  19.0*   ALKPHO  --   --   --   --   --  86   AST  --   --   --   --   --  25   ALT  --   --   --   --   --  32   BILT  --   --   --   --   --  0.3   TP  --   --   --   --   --  6.3   PHOS 3.9  --   --  5.2*  --  5.9*    < > = values in this interval not displayed.       Imaging  XR CHEST AP PORTABLE  (CPT=71045)    Result Date: 12/2/2024  CONCLUSION:  1. Redemonstration of multifocal airspace disease, perhaps indicative of infectious process or developing pulmonary alveolar edema. Follow-up is advised to document resolution.  2. Additional support tubes and lines as above.    elm-remote.   Dictated by (CST): Eulalio Shaikh MD on 12/02/2024 at 7:57 AM     Finalized by (CST): Eulalio Shaikh MD on 12/02/2024 at 8:00 AM          XR CHEST AP PORTABLE  (CPT=71045)    Result Date: 12/1/2024  CONCLUSION:  1. Multifocal patchy opacities throughout the right greater than left lungs are similar in comparison to previous day chest radiograph. 2. Stable radiographic appearance of support apparatus.    Dictated by (CST): Evaristo Rosado MD on 12/01/2024 at 9:04 AM     Finalized by (CST): Evaristo Rosdao MD on 12/01/2024 at 9:06 AM             Medications:    Current Facility-Administered Medications:     metoclopramide (Reglan) 5 mg/mL injection 5 mg, 5 mg,  Intravenous, q6h    docusate (Colace) 50 MG/5ML oral solution 100 mg, 100 mg, Oral, BID    labetalol (Trandate) 400 mg in sodium chloride 0.9% 200 mL infusion, 1 mg/min, Intravenous, Continuous    [Held by provider] furosemide (Lasix) 10 mg/mL injection 40 mg, 40 mg, Intravenous, Daily    hydrALAZINE (Apresoline) tab 150 mg, 150 mg, Oral, Q8H GABRIEL    cloNIDine (Catapres) 0.2 MG/24HR patch 1 patch, 1 patch, Transdermal, Weekly    isosorbide dinitrate (Isordil) tab 20 mg, 20 mg, Per NG Tube, TID (Nitrates)    insulin regular human (Novolin R, Humulin R) 100 UNIT/ML injection 1-5 Units, 1-5 Units, Subcutaneous, 4 times per day    carvedilol (Coreg) tab 6.25 mg, 6.25 mg, Oral, BID with meals    amLODIPine (Norvasc) tab 10 mg, 10 mg, Oral, Daily    [Held by provider] losartan (Cozaar) tab 50 mg, 50 mg, Oral, Daily    dextrose 10% infusion (TPN no rate), , Intravenous, Continuous PRN    pancrelipase (Lip-Prot-Amyl) (Zenpep) DR particles cap 10,000 Units, 10,000 Units, Per G Tube, PRN **AND** sodium bicarbonate tab 325 mg, 325 mg, Oral, PRN    sodium chloride 3 % nebulizer solution 3 mL, 3 mL, Nebulization, 4 times per day    ipratropium-albuterol (Duoneb) 0.5-2.5 (3) MG/3ML inhalation solution 3 mL, 3 mL, Nebulization, Q6H PRN    albuterol (Ventolin) (2.5 MG/3ML) 0.083% nebulizer solution 2.5 mg, 2.5 mg, Nebulization, 4 times per day    HYDROcodone-acetaminophen (Norco) 5-325 MG per tab 2 tablet, 2 tablet, Oral, Q4H PRN    heparin (Porcine) 5000 UNIT/ML injection 5,000 Units, 5,000 Units, Subcutaneous, Q8H GABRIEL    melatonin tab 3 mg, 3 mg, Oral, Nightly PRN    polyethylene glycol (PEG 3350) (Miralax) 17 g oral packet 17 g, 17 g, Oral, Daily PRN    bisacodyl (Dulcolax) 10 MG rectal suppository 10 mg, 10 mg, Rectal, Daily PRN    ondansetron (Zofran) 4 MG/2ML injection 4 mg, 4 mg, Intravenous, Q6H PRN    dexmedeTOMIDine in sodium chloride 0.9% (Precedex) 400 mcg/100mL infusion premix, 0.2-1.5 mcg/kg/hr (Dosing Weight),  Intravenous, Continuous    norepinephrine (Levophed) 4 mg/250mL infusion premix, 0.5-30 mcg/min, Intravenous, Continuous PRN    potassium chloride 20 mEq/100mL IVPB premix 20 mEq, 20 mEq, Intravenous, PRN **OR** potassium chloride 40 mEq/100mL IVPB premix (central line) 40 mEq, 40 mEq, Intravenous, PRN    magnesium sulfate in dextrose 5% 1 g/100mL infusion premix 1 g, 1 g, Intravenous, PRN    magnesium sulfate in sterile water for injection 2 g/50mL IVPB premix 2 g, 2 g, Intravenous, PRN    chlorhexidine gluconate (Peridex) 0.12 % oral solution 15 mL, 15 mL, Mouth/Throat, BID    morphINE PF 2 MG/ML injection 2 mg, 2 mg, Intravenous, Q2H PRN **OR** [DISCONTINUED] morphINE PF 4 MG/ML injection 4 mg, 4 mg, Intravenous, Q2H PRN    propofol (Diprivan) 10 mg/mL infusion premix, 5-50 mcg/kg/min (Dosing Weight), Intravenous, Continuous    fentaNYL (Sublimaze) 50 mcg/mL injection 25 mcg, 25 mcg, Intravenous, Q3H PRN    famotidine (Pepcid) tab 20 mg, 20 mg, Oral, Daily **OR** famotidine (Pepcid) 20 mg/2mL injection 20 mg, 20 mg, Intravenous, Daily    aspirin chewable tab 81 mg, 81 mg, Oral, Daily    Allergies:  Allergies[1]    Input/Output:    Intake/Output Summary (Last 24 hours) at 12/3/2024 1025  Last data filed at 12/3/2024 0714  Gross per 24 hour   Intake 3475.6 ml   Output 1355 ml   Net 2120.6 ml          ASSESSMENT/PLAN:   Assessment   Patient Active Problem List   Diagnosis    Hypercholesteremia    Alcoholism (HCC)    Essential hypertension    Vitamin D deficiency    Adjustment disorder with mixed anxiety and depressed mood    Controlled type 2 diabetes mellitus without complication, without long-term current use of insulin (HCC)    Obesity (BMI 30-39.9)    Neck mass    Polyp of colon    Flat feet, bilateral    Hypokalemia    Myalgia due to statin    Age-related nuclear cataract of both eyes    Positive for microalbuminuria    Dissection of ascending aorta (HCC)    NAIMA (acute kidney injury) ([facility])    Stage 3 chronic  kidney disease (HCC)    Acute respiratory failure with hypoxia (HCC)       UO incomplete but creatinine increasing to 1.60.  Had Lasix this am and will now place on hold.  Hopefully will tolerate SBT.  Hb 8.0.  Iron def.  Ferrlecit ordered.  Discussed with RT and RN.               12/3/2024  Juan Franco MD               [1]   Allergies  Allergen Reactions    Atorvastatin MYALGIA    Pravastatin MYALGIA    Rosuvastatin MYALGIA    Seasonal Runny nose

## 2024-12-03 NOTE — PLAN OF CARE
Pt rec'd resting in bed; sedated / arousable, follows yes/no commands. PICC line placed to CLEMENTINA. RIJ removed per order. Left a-line positional; removed and replace with right a-line per MD order. Webb in-place / intact; draining clear yellow urine to gravity. GI & ENT placed on consult. BP w/d/l. Family updated to plan of care. Wrist restraints in place for pt safety. High risk lines / drains in-place. RN observed pt interfering with LDA's when off sedation.     Problem: CARDIOVASCULAR - ADULT  Goal: Maintains optimal cardiac output and hemodynamic stability  Description: INTERVENTIONS:  - Monitor vital signs, rhythm, and trends  - Monitor for bleeding, hypotension and signs of decreased cardiac output  - Evaluate effectiveness of vasoactive medications to optimize hemodynamic stability  - Monitor arterial and/or venous puncture sites for bleeding and/or hematoma  - Assess quality of pulses, skin color and temperature  - Assess for signs of decreased coronary artery perfusion - ex. Angina  - Evaluate fluid balance, assess for edema, trend weights  Outcome: Progressing     Problem: RESPIRATORY - ADULT  Goal: Achieves optimal ventilation and oxygenation  Description: INTERVENTIONS:  - Assess for changes in respiratory status  - Assess for changes in mentation and behavior  - Position to facilitate oxygenation and minimize respiratory effort  - Oxygen supplementation based on oxygen saturation or ABGs  - Provide Smoking Cessation handout, if applicable  - Encourage broncho-pulmonary hygiene including cough, deep breathe, Incentive Spirometry  - Assess the need for suctioning and perform as needed  - Assess and instruct to report SOB or any respiratory difficulty  - Respiratory Therapy support as indicated  - Manage/alleviate anxiety  - Monitor for signs/symptoms of CO2 retention  Outcome: Progressing     Problem: Safety Risk - Non-Violent Restraints  Goal: Patient will remain free from self-harm  Description:  INTERVENTIONS:  - Apply the least restrictive restraint to prevent harm  - Notify patient and family of reasons restraints applied  - Assess for any contributing factors to confusion (electrolyte disturbances, delirium, medications)  - Discontinue any unnecessary medical devices as soon as possible  - Assess the patient's physical comfort, circulation, skin condition, hydration, nutrition and elimination needs   - Reorient and redirection as needed  - Assess for the need to continue restraints  Outcome: Progressing

## 2024-12-03 NOTE — PROGRESS NOTES
[facility]  part of [facility]     Progress Note    Alisha Wilde Patient Status:  Inpatient    10/25/1965 MRN [id number]   Location [facility] 2W/SW Attending Aguila Villegas MD   Hosp Day # 11 PCP Bridger Avendano MD       Subjective:   Patient seen and examined.  Intubated, sedated.  Arousable during spontaneous breathing trial.    Objective:   Blood pressure 125/54, pulse 77, temperature 98.5 °F (36.9 °C), temperature source Temporal, resp. rate 26, height 5' 5\" (1.651 m), weight 285 lb 7.9 oz (129.5 kg), SpO2 96%, not currently breastfeeding.  Intake/Output:   Last 3 shifts: I/O last 3 completed shifts:  In: 6902.9 [I.V.:4809.9; NG/GT:2093]  Out: 2290 [Urine:2285; Emesis/NG output:5]   This shift: I/O this shift:  In: 148 [I.V.:48; IV PIGGYBACK:100]  Out: 750 [Urine:750]     Vent Settings: Vent Mode: VC+  FiO2 (%):  [30 %] 30 %  S RR:  [18] 18  S VT:  [550 mL] 550 mL  PEEP/CPAP (cm H2O):  [5 cm H20] 5 cm H20  MAP (cm H2O):  [8-13] 8    Hemodynamic parameters (last 24 hours):      Scheduled Meds:   Current Facility-Administered Medications   Medication Dose Route Frequency    metoclopramide (Reglan) 5 mg/mL injection 5 mg  5 mg Intravenous q6h    docusate (Colace) 50 MG/5ML oral solution 100 mg  100 mg Oral BID    sodium ferric gluconate (Ferrlecit) 125 mg in sodium chloride 0.9% 100mL IVPB premix  125 mg Intravenous Daily    isosorbide dinitrate (Isordil) tab 40 mg  40 mg Per NG Tube TID (Nitrates)    labetalol (Trandate) 400 mg in sodium chloride 0.9% 200 mL infusion  1 mg/min Intravenous Continuous    [Held by provider] furosemide (Lasix) 10 mg/mL injection 40 mg  40 mg Intravenous Daily    hydrALAZINE (Apresoline) tab 150 mg  150 mg Oral Q8H GABRIEL    cloNIDine (Catapres) 0.2 MG/24HR patch 1 patch  1 patch Transdermal Weekly    insulin regular human (Novolin R, Humulin R) 100 UNIT/ML injection 1-5 Units  1-5 Units Subcutaneous 4 times per day    carvedilol (Coreg) tab 6.25 mg   6.25 mg Oral BID with meals    amLODIPine (Norvasc) tab 10 mg  10 mg Oral Daily    [Held by provider] losartan (Cozaar) tab 50 mg  50 mg Oral Daily    dextrose 10% infusion (TPN no rate)   Intravenous Continuous PRN    pancrelipase (Lip-Prot-Amyl) (Zenpep) DR particles cap 10,000 Units  10,000 Units Per G Tube PRN    And    sodium bicarbonate tab 325 mg  325 mg Oral PRN    sodium chloride 3 % nebulizer solution 3 mL  3 mL Nebulization 4 times per day    ipratropium-albuterol (Duoneb) 0.5-2.5 (3) MG/3ML inhalation solution 3 mL  3 mL Nebulization Q6H PRN    albuterol (Ventolin) (2.5 MG/3ML) 0.083% nebulizer solution 2.5 mg  2.5 mg Nebulization 4 times per day    HYDROcodone-acetaminophen (Norco) 5-325 MG per tab 2 tablet  2 tablet Oral Q4H PRN    heparin (Porcine) 5000 UNIT/ML injection 5,000 Units  5,000 Units Subcutaneous Q8H GABRIEL    melatonin tab 3 mg  3 mg Oral Nightly PRN    polyethylene glycol (PEG 3350) (Miralax) 17 g oral packet 17 g  17 g Oral Daily PRN    bisacodyl (Dulcolax) 10 MG rectal suppository 10 mg  10 mg Rectal Daily PRN    ondansetron (Zofran) 4 MG/2ML injection 4 mg  4 mg Intravenous Q6H PRN    dexmedeTOMIDine in sodium chloride 0.9% (Precedex) 400 mcg/100mL infusion premix  0.2-1.5 mcg/kg/hr (Dosing Weight) Intravenous Continuous    norepinephrine (Levophed) 4 mg/250mL infusion premix  0.5-30 mcg/min Intravenous Continuous PRN    potassium chloride 20 mEq/100mL IVPB premix 20 mEq  20 mEq Intravenous PRN    Or    potassium chloride 40 mEq/100mL IVPB premix (central line) 40 mEq  40 mEq Intravenous PRN    magnesium sulfate in dextrose 5% 1 g/100mL infusion premix 1 g  1 g Intravenous PRN    magnesium sulfate in sterile water for injection 2 g/50mL IVPB premix 2 g  2 g Intravenous PRN    chlorhexidine gluconate (Peridex) 0.12 % oral solution 15 mL  15 mL Mouth/Throat BID    morphINE PF 2 MG/ML injection 2 mg  2 mg Intravenous Q2H PRN    propofol (Diprivan) 10 mg/mL infusion premix  5-50 mcg/kg/min  (Dosing Weight) Intravenous Continuous    fentaNYL (Sublimaze) 50 mcg/mL injection 25 mcg  25 mcg Intravenous Q3H PRN    famotidine (Pepcid) tab 20 mg  20 mg Oral Daily    Or    famotidine (Pepcid) 20 mg/2mL injection 20 mg  20 mg Intravenous Daily    aspirin chewable tab 81 mg  81 mg Oral Daily       Continuous Infusions:    labetalol (Trandate) 400 mg in sodium chloride 0.9% 200 mL infusion 1 mg/min (12/03/24 0800)    dextrose 10%      dexmedetomidine 0.2 mcg/kg/hr (12/03/24 0943)    norepinephrine Stopped (11/23/24 2225)    propofol 45 mcg/kg/min (12/03/24 1037)       Physical Exam  Constitutional: no acute distress, intubated, sedated  Eyes: PERRL  ENT: nares pateint  Neck: supple, no JVD  Cardio: RRR, S1 S2  Respiratory: Bilateral crackles  GI: abdomen soft, non tender, active bowel sounds, no organomegaly  Extremities: no clubbing, cyanosis, edema  Neurologic: no gross motor deficits  Skin: warm, dry      Results:     Lab Results   Component Value Date    WBC 11.5 12/03/2024    HGB 8.0 12/03/2024    HCT 25.3 12/03/2024    .0 12/03/2024    CREATSERUM 1.60 12/03/2024    CREATSERUM 1.60 12/03/2024    BUN 33 12/03/2024    BUN 33 12/03/2024     12/03/2024     12/03/2024    K 4.5 12/03/2024    K 4.5 12/03/2024     12/03/2024     12/03/2024    CO2 19.0 12/03/2024    CO2 19.0 12/03/2024     12/03/2024     12/03/2024    CA 8.9 12/03/2024    CA 8.9 12/03/2024    ALB 3.4 12/03/2024    ALKPHO 86 12/03/2024    BILT 0.3 12/03/2024    TP 6.3 12/03/2024    AST 25 12/03/2024    ALT 32 12/03/2024    MG 2.3 12/03/2024    PHOS 5.9 12/03/2024       XR CHEST AP PORTABLE  (CPT=71045)    Result Date: 12/2/2024  CONCLUSION:  1. Redemonstration of multifocal airspace disease, perhaps indicative of infectious process or developing pulmonary alveolar edema. Follow-up is advised to document resolution.  2. Additional support tubes and lines as above.    elm-remote.   Dictated by (CST): Neel  MD Eulalio on 12/02/2024 at 7:57 AM     Finalized by (CST): Eulalio Shaikh MD on 12/02/2024 at 8:00 AM                 Assessment   1.  Type A aortic dissection status postrepair  2.  Acute hypoxemic respiratory failure  3.  Acute kidney injury on chronic kidney disease  4.  Aspiration pneumonia  5.  Anemia   6.  Prior nicotine dependence  7.  EtOH abuse     Plan   -Patient presenting with evidence of type a aortic dissection status post repair by CV surgery on 11/21/2024.  -Postoperative respiratory failure complicated by self extubation with emesis and aspiration noted afterwards requiring reintubation.  -Continue antibiotic therapy with Zosyn at this time  -Evaluated patient twice during breathing trial.  Initially acceptable RSBI but gradual worsening with ongoing significant secretions noted.  After discussion with CV surgeon plan to proceed with tracheostomy with ENT tomorrow.  Discussed with ENT.  -Frequent suctioning.  Bronchial hygiene.    -CT chest abdomen pelvis on 11/26/2024 with bilateral multi lobar nodular consolidation seen with some atelectatic changes present.  -Bronchial hygiene.  -Will hold tube feedings at 10 PM tonight in anticipation of tracheostomy  -Closely monitor renal function and urine output  -DVT prophylaxis: Heparin  -Discussed with nursing staff    Critical care time spent 30 minutes    Dmitri Herman DO  Pulmonary Critical Care Medicine  [facility]

## 2024-12-03 NOTE — PROGRESS NOTES
[facility]  part of [facility]    Progress Note    Alisha Wilde Patient Status:  Inpatient    10/25/1965 MRN [id number]   Location [facility] 2W/SW Attending Hayde Brito MD   Hosp Day # 11 PCP Bridger Avendano MD     Subjective:  Pt currently intubated/sedated. No visitors at bedside.     Objective:  /64   Pulse 77   Temp 98.2 °F (36.8 °C) (Temporal)   Resp 19   Ht 5' 5\" (1.651 m)   Wt 285 lb 7.9 oz (129.5 kg)   SpO2 97%   BMI 47.51 kg/m²     Temp (24hrs), Av.1 °F (36.7 °C), Min:97.5 °F (36.4 °C), Max:98.4 °F (36.9 °C)      Intake/Output:    Intake/Output Summary (Last 24 hours) at 12/3/2024 0817  Last data filed at 12/3/2024 0714  Gross per 24 hour   Intake 3475.6 ml   Output 1355 ml   Net 2120.6 ml       Wt Readings from Last 3 Encounters:   24 285 lb 7.9 oz (129.5 kg)   10/24/24 254 lb (115.2 kg)   24 249 lb (112.9 kg)       Allergies:  Allergies[1]    Labs:  Lab Results   Component Value Date    WBC 11.5 2024    HGB 8.0 2024    HCT 25.3 2024    .0 2024    CREATSERUM 1.60 2024    CREATSERUM 1.60 2024    BUN 33 2024    BUN 33 2024     2024     2024    K 4.5 2024    K 4.5 2024     2024     2024    CO2 19.0 2024    CO2 19.0 2024     2024     2024    CA 8.9 2024    CA 8.9 2024    ALB 3.4 2024    ALKPHO 86 2024    BILT 0.3 2024    TP 6.3 2024    AST 25 2024    ALT 32 2024    MG 2.3 2024    PHOS 5.9 2024       Physical Exam:  Blood pressure 146/64, pulse 77, temperature 98.2 °F (36.8 °C), temperature source Temporal, resp. rate 19, height 5' 5\" (1.651 m), weight 285 lb 7.9 oz (129.5 kg), SpO2 97%, not currently breastfeeding.  General: NAD  Neck: No JVD  Lungs: Clear anteriorly/diminished laterally bilaterally   Heart: RRR, S1, S2  Abdomen:  Soft/Obese, NT/ND, BS+x 4/+BM  Extremities: Warm, dry, generalized UE & LE edema bilat  Pulses: 2+ bilat DP  Skin: sternotomy incision CATHIE=CDI   Neurological:  sedated    Assessment/Plan:  S/P EMERGENT AORTIC DISSECTION REPAIR POD # 11  Respiratory Failure-currently on full vent support. Fails daily SBT after approx 20-30 min: becomes hypertensive/tachypenic. Re-intubated on 11/22 per Pulm after emesis episode/ETT removal due to emesis content noted in airway. ENT consulted for Trach placement.   ABX for suspected aspiration pneumonia.   CT Chest/Abdomen/Pelvis (non-contrast) 11/26=no obstruction noted/stable. +BM (s)  HTN: on multiple antihypertensives including IV Labetolol-SBP goal= <130/MAP >70. Mgt per Cards. New A-line placed 12/2  New PICC placed 12/2-TLC removed yesterday  Pain meds as needed  Scds/Heparin SubQ prophylaxis DVT prevention. HIPA 11/25=negative. Plts continue >100,000  Continue ICU status  Expected post op volume overload: mgt per Nephrology. Hx: CKD-limit/avoid nephrotoxic meds. On Lasix daily. Webb remains for close I/O monitoring & immobility.  Expected post op anemia: w/o clinical significance/stable. May be slightly diluted due to volume overload   No hx: DM-has not required correction scale coverage past 24 hours- will stop insulin and glucose checks  Hx: Morbid obesity w/BMI >40-plan for RD to see when appropriate  Nutrition per TF via OGT-at goal -GI consulted for PEG  Hx: CKD w/mgt per Nephrology consulted on 11/23 for post op NAIMA-following recs  No BM past couple days- will resume Reglan and Colace.   Hx: ETOH abuse-monitor for withdrawals. CIWA protocol PRN   Discharge planning: pt lives alone and has supportive family. Continue to monitor as pt progresses.   Discussed Trach/PEG placement w/brother Osbaldo yesterday via telephone as well as a sister at bedside yesterday afternoon. They will further discuss w/sister Mariajose as well.   Anticipate LTAC at discharge: referrals sent  proactively by  on 11/29 for LTAC.     Plan of care discussed w/RN  Mariela Noel, APRN  12/3/2024  8:17 AM       [1]   Allergies  Allergen Reactions    Atorvastatin MYALGIA    Pravastatin MYALGIA    Rosuvastatin MYALGIA    Seasonal Runny nose

## 2024-12-03 NOTE — H&P (VIEW-ONLY)
Is this a shared or split note between Advanced Practice Provider and Physician? Yes       [facility]   Gastroenterology Consultation Note    Alisha Wilde  Patient Status:    Inpatient  Date of Admission:         2024, Hospital day #11  Attending:   Hayde Brito MD  PCP:     Bridger Avendano MD    Reason for Consultation:  PEG tube consult    History of Present Illness:  Alisha Wilde is a 59 year old female w/ PMHx of hypertension, GERD, CKD, hyperlipidemia who presented to ER 2024 for chest pain. Underwent emergent repair of aortic type A dissection on 2024. Has not been able to weaned from vent. Patient does not answer to questions.     Pertinent Family Hx:  - No known history of esophageal, gastric or colon cancers  - No known IBD  - No known liver pathologies    Endoscopy Hx:  - Colonoscopy 2018, colon polyps   High conscious sedation requirement     Social Hx:  - former smoker/1-2 drinks a week  - Denies illicit drug use  - Lives with: family   - Occupation: [employer]       History:  Past Medical History:    Chronic kidney disease (CKD)    Esophageal reflux    High blood pressure    High cholesterol    HYPERTENSION    Hypertension    Unspecified essential hypertension     Past Surgical History:   Procedure Laterality Date    Colonoscopy N/A 2018    Procedure: COLONOSCOPY;  Surgeon: Magdy Webber MD;  Location: [facility] ENDOSCOPY    Endometrial ablation      child passed away for 5 mins          1    Other surgical history  11    cysto-Dr. Lacey -- pt denies     Family History   Problem Relation Age of Onset    Hypertension Mother     Heart Disorder Mother 70    Other (Other) Mother         kidney failure    Hypertension Father     Hypertension Maternal Grandfather     Cancer Neg     Diabetes Neg     Glaucoma Neg     Macular degeneration Neg       reports that she quit smoking about 25 years ago. Her smoking use included cigarettes. She started smoking  about 38 years ago. She has a 13 pack-year smoking history. She has quit using smokeless tobacco. She reports current alcohol use of about 1.0 - 2.0 standard drink of alcohol per week. She reports that she does not use drugs.    Allergies:  Allergies[1]    Medications:    Current Facility-Administered Medications:     metoclopramide (Reglan) 5 mg/mL injection 5 mg, 5 mg, Intravenous, q6h    docusate (Colace) 50 MG/5ML oral solution 100 mg, 100 mg, Oral, BID    labetalol (Trandate) 400 mg in sodium chloride 0.9% 200 mL infusion, 1 mg/min, Intravenous, Continuous    furosemide (Lasix) 10 mg/mL injection 40 mg, 40 mg, Intravenous, Daily    hydrALAZINE (Apresoline) tab 150 mg, 150 mg, Oral, Q8H GABRIEL    cloNIDine (Catapres) 0.2 MG/24HR patch 1 patch, 1 patch, Transdermal, Weekly    isosorbide dinitrate (Isordil) tab 20 mg, 20 mg, Per NG Tube, TID (Nitrates)    insulin regular human (Novolin R, Humulin R) 100 UNIT/ML injection 1-5 Units, 1-5 Units, Subcutaneous, 4 times per day    carvedilol (Coreg) tab 6.25 mg, 6.25 mg, Oral, BID with meals    amLODIPine (Norvasc) tab 10 mg, 10 mg, Oral, Daily    [Held by provider] losartan (Cozaar) tab 50 mg, 50 mg, Oral, Daily    dextrose 10% infusion (TPN no rate), , Intravenous, Continuous PRN    pancrelipase (Lip-Prot-Amyl) (Zenpep) DR particles cap 10,000 Units, 10,000 Units, Per G Tube, PRN **AND** sodium bicarbonate tab 325 mg, 325 mg, Oral, PRN    sodium chloride 3 % nebulizer solution 3 mL, 3 mL, Nebulization, 4 times per day    ipratropium-albuterol (Duoneb) 0.5-2.5 (3) MG/3ML inhalation solution 3 mL, 3 mL, Nebulization, Q6H PRN    albuterol (Ventolin) (2.5 MG/3ML) 0.083% nebulizer solution 2.5 mg, 2.5 mg, Nebulization, 4 times per day    HYDROcodone-acetaminophen (Norco) 5-325 MG per tab 2 tablet, 2 tablet, Oral, Q4H PRN    heparin (Porcine) 5000 UNIT/ML injection 5,000 Units, 5,000 Units, Subcutaneous, Q8H GABRIEL    melatonin tab 3 mg, 3 mg, Oral, Nightly PRN    polyethylene  glycol (PEG 3350) (Miralax) 17 g oral packet 17 g, 17 g, Oral, Daily PRN    bisacodyl (Dulcolax) 10 MG rectal suppository 10 mg, 10 mg, Rectal, Daily PRN    ondansetron (Zofran) 4 MG/2ML injection 4 mg, 4 mg, Intravenous, Q6H PRN    dexmedeTOMIDine in sodium chloride 0.9% (Precedex) 400 mcg/100mL infusion premix, 0.2-1.5 mcg/kg/hr (Dosing Weight), Intravenous, Continuous    norepinephrine (Levophed) 4 mg/250mL infusion premix, 0.5-30 mcg/min, Intravenous, Continuous PRN    potassium chloride 20 mEq/100mL IVPB premix 20 mEq, 20 mEq, Intravenous, PRN **OR** potassium chloride 40 mEq/100mL IVPB premix (central line) 40 mEq, 40 mEq, Intravenous, PRN    magnesium sulfate in dextrose 5% 1 g/100mL infusion premix 1 g, 1 g, Intravenous, PRN    magnesium sulfate in sterile water for injection 2 g/50mL IVPB premix 2 g, 2 g, Intravenous, PRN    chlorhexidine gluconate (Peridex) 0.12 % oral solution 15 mL, 15 mL, Mouth/Throat, BID    morphINE PF 2 MG/ML injection 2 mg, 2 mg, Intravenous, Q2H PRN **OR** [DISCONTINUED] morphINE PF 4 MG/ML injection 4 mg, 4 mg, Intravenous, Q2H PRN    propofol (Diprivan) 10 mg/mL infusion premix, 5-50 mcg/kg/min (Dosing Weight), Intravenous, Continuous    fentaNYL (Sublimaze) 50 mcg/mL injection 25 mcg, 25 mcg, Intravenous, Q3H PRN    famotidine (Pepcid) tab 20 mg, 20 mg, Oral, Daily **OR** famotidine (Pepcid) 20 mg/2mL injection 20 mg, 20 mg, Intravenous, Daily    aspirin chewable tab 81 mg, 81 mg, Oral, Daily    Review of Systems:   Unable to complete.     Physical Exam:    Blood pressure 146/64, pulse 77, temperature 98.5 °F (36.9 °C), temperature source Temporal, resp. rate 19, height 5' 5\" (1.651 m), weight 285 lb 7.9 oz (129.5 kg), SpO2 97%, not currently breastfeeding. Body mass index is 47.51 kg/m².    Gen: no acute distress, intubated, sedated   HEENT: EOMI, the sclera appears anicteric, oropharynx clear, mucus membranes appear moist  CV: RRR  Lung: no conversational dyspnea   Abdomen:  soft NTND abdomen with NABS appreciated   Back: No CVA tenderness   Skin: dry, warm, no jaundice  Ext: no LE edema is evident  Neuro: somnolent     Laboratory Data:  Lab Results   Component Value Date    WBC 11.5 12/03/2024    HGB 8.0 12/03/2024    HCT 25.3 12/03/2024    .0 12/03/2024    CREATSERUM 1.60 12/03/2024    CREATSERUM 1.60 12/03/2024    BUN 33 12/03/2024    BUN 33 12/03/2024     12/03/2024     12/03/2024    K 4.5 12/03/2024    K 4.5 12/03/2024     12/03/2024     12/03/2024    CO2 19.0 12/03/2024    CO2 19.0 12/03/2024     12/03/2024     12/03/2024    CA 8.9 12/03/2024    CA 8.9 12/03/2024    ALB 3.4 12/03/2024    ALKPHO 86 12/03/2024    BILT 0.3 12/03/2024    TP 6.3 12/03/2024    AST 25 12/03/2024    ALT 32 12/03/2024    MG 2.3 12/03/2024    PHOS 5.9 12/03/2024       Imaging:  XR CHEST AP PORTABLE  (CPT=71045)    Result Date: 12/2/2024  CONCLUSION:  1. Redemonstration of multifocal airspace disease, perhaps indicative of infectious process or developing pulmonary alveolar edema. Follow-up is advised to document resolution.  2. Additional support tubes and lines as above.    elm-remote.   Dictated by (CST): Eulalio Shaikh MD on 12/02/2024 at 7:57 AM     Finalized by (CST): Eulalio Shaikh MD on 12/02/2024 at 8:00 AM          XR CHEST AP PORTABLE  (CPT=71045)    Result Date: 12/1/2024  CONCLUSION:  1. Multifocal patchy opacities throughout the right greater than left lungs are similar in comparison to previous day chest radiograph. 2. Stable radiographic appearance of support apparatus.    Dictated by (CST): Evaristo Rosado MD on 12/01/2024 at 9:04 AM     Finalized by (CST): Evaristo Rosado MD on 12/01/2024 at 9:06 AM           Assessment & Plan   Alisha Wilde is a 59 year old female w/ PMHx of hypertension, GERD, CKD, hyperlipidemia who presented to ER 11/21/2024 for chest pain. Underwent emergent repair of aortic type A dissection on 11/21/2024. Has not been  able to weaned from vent. Patient does not answer to questions.     #PEG consult  Called and talked with brother Osbaldo who is patient's POA. Discussed that patient will need PEG to placement to assist with nutrition after the tracheostomy is completed. Discussed the procedure in depth. He is ok with proceeding at this time. He wants everything done to continue his sister's care.  CT a/p completed during admission no abnormalities noted to avoid PEG placement.     EGD/PEG consent: I have discussed the risks, benefits, and alternatives to upper endoscopy WITH gastrostomy placement with the patient [who demonstrated understanding], including but not limited to the risks of bleeding, infection, pain, misplacement, death, as well as the risks of anesthesia and perforation all leading to prolonged hospitalization, surgical intervention, or even death. I also specifically mentioned the miss rate of upper endoscopy of 5-10% in the best of all circumstances. All questions were answered to the patient’s satisfaction. The patient has agreed to sign an informed consent and elected to proceed with upper endoscopy with possible intervention [i.e. polypectomy, ablation, stent placement, etc.] as indicated.      Recommend:  -PEG tube placement day after tracheostomy (tentatively 12/5/2024)      Thank you for the opportunity to participate in the care of this patient.    Case discussed with Toma Barrientos MD and Bryan AGEE.    Kinjal Childs PA-C  [facility] Gastroenterology  12/3/2024         [1]   Allergies  Allergen Reactions    Atorvastatin MYALGIA    Pravastatin MYALGIA    Rosuvastatin MYALGIA    Seasonal Runny nose

## 2024-12-03 NOTE — PLAN OF CARE
Problem: RESPIRATORY - ADULT  Goal: Achieves optimal ventilation and oxygenation  Description: INTERVENTIONS:  - Assess for changes in respiratory status  - Assess for changes in mentation and behavior  - Position to facilitate oxygenation and minimize respiratory effort  - Oxygen supplementation based on oxygen saturation or ABGs  - Provide Smoking Cessation handout, if applicable  - Encourage broncho-pulmonary hygiene including cough, deep breathe, Incentive Spirometry  - Assess the need for suctioning and perform as needed  - Assess and instruct to report SOB or any respiratory difficulty  - Respiratory Therapy support as indicated  - Manage/alleviate anxiety  - Monitor for signs/symptoms of CO2 retention  Outcome: Progressing     Pt received intubated with ETT size 7.5 at 26 cm at the lip, on full vent support. Pt saturating appropriately, FiO2 wean down to 30%, suction provided as needed.    Vent settings and readings as follow:     12/02/24 1918   Vent Information   Interface Invasive   Vent Type    Vent plugged into main power? Yes   Vent ID    Vent Mode VC+   Settings   FiO2 (%) 30 %   Resp Rate (Set) 18   Vt (Set, mL) 550 mL   Waveform Decelerating ramp   PEEP/CPAP (cm H2O) 5 cm H20   Insp Time (sec) 0.7 sec   Trigger Sensitivity Flow (L/min) 2 L/min   Humidification Heater   H2O Bag Level 3/4 Full   Heater Temperature 98.8 °F (37.1 °C)   Readings   Total RR 18   Minute Ventilation (L/min) 9.8 L/min   Expiratory Tidal Volume 551 mL   PIP Observed (cm H2O) 35 cm H2O   MAP (cm H2O) 12   I/E Ratio 1:3.8   Plateau Pressure (cm H2O) 27 cm H2O   Static Compliance (L/cm H2O) 27   Dynamic Compliance (L/cm H2O) 43 L/cm H2O   Alarms   High RR 40   Insp Pressure High (cm H2O) 50 cm H2O   Insp Pressure Low (cm H2O) 9 cm H2O   MV High (L/min) 20 L/min   MV Low (L/min) 3 L/min   Apnea Interval (sec) 20 seconds   Apnea Rate 18   Apnea Volume (mL) 550 mL

## 2024-12-03 NOTE — CONSULTS
Is this a shared or split note between Advanced Practice Provider and Physician? Yes       [facility]   Gastroenterology Consultation Note    Alisha Wilde  Patient Status:    Inpatient  Date of Admission:         2024, Hospital day #11  Attending:   Hayde Brito MD  PCP:     Bridger Avendano MD    Reason for Consultation:  PEG tube consult    History of Present Illness:  Alisha Wilde is a 59 year old female w/ PMHx of hypertension, GERD, CKD, hyperlipidemia who presented to ER 2024 for chest pain. Underwent emergent repair of aortic type A dissection on 2024. Has not been able to weaned from vent. Patient does not answer to questions.     Pertinent Family Hx:  - No known history of esophageal, gastric or colon cancers  - No known IBD  - No known liver pathologies    Endoscopy Hx:  - Colonoscopy 2018, colon polyps   High conscious sedation requirement     Social Hx:  - former smoker/1-2 drinks a week  - Denies illicit drug use  - Lives with: family   - Occupation: [employer]       History:  Past Medical History:    Chronic kidney disease (CKD)    Esophageal reflux    High blood pressure    High cholesterol    HYPERTENSION    Hypertension    Unspecified essential hypertension     Past Surgical History:   Procedure Laterality Date    Colonoscopy N/A 2018    Procedure: COLONOSCOPY;  Surgeon: Magdy Webber MD;  Location[facility] ENDOSCOPY    Endometrial ablation      child passed away for 5 mins          1    Other surgical history  11    cysto-Dr. Lacey -- pt denies     Family History   Problem Relation Age of Onset    Hypertension Mother     Heart Disorder Mother 70    Other (Other) Mother         kidney failure    Hypertension Father     Hypertension Maternal Grandfather     Cancer Neg     Diabetes Neg     Glaucoma Neg     Macular degeneration Neg       reports that she quit smoking about 25 years ago. Her smoking use included cigarettes. She started smoking  about 38 years ago. She has a 13 pack-year smoking history. She has quit using smokeless tobacco. She reports current alcohol use of about 1.0 - 2.0 standard drink of alcohol per week. She reports that she does not use drugs.    Allergies:  Allergies[1]    Medications:    Current Facility-Administered Medications:     metoclopramide (Reglan) 5 mg/mL injection 5 mg, 5 mg, Intravenous, q6h    docusate (Colace) 50 MG/5ML oral solution 100 mg, 100 mg, Oral, BID    labetalol (Trandate) 400 mg in sodium chloride 0.9% 200 mL infusion, 1 mg/min, Intravenous, Continuous    furosemide (Lasix) 10 mg/mL injection 40 mg, 40 mg, Intravenous, Daily    hydrALAZINE (Apresoline) tab 150 mg, 150 mg, Oral, Q8H [facility]    cloNIDine (Catapres) 0.2 MG/24HR patch 1 patch, 1 patch, Transdermal, Weekly    isosorbide dinitrate (Isordil) tab 20 mg, 20 mg, Per NG Tube, TID (Nitrates)    insulin regular human (Novolin R, Humulin R) 100 UNIT/ML injection 1-5 Units, 1-5 Units, Subcutaneous, 4 times per day    carvedilol (Coreg) tab 6.25 mg, 6.25 mg, Oral, BID with meals    amLODIPine (Norvasc) tab 10 mg, 10 mg, Oral, Daily    [Held by provider] losartan (Cozaar) tab 50 mg, 50 mg, Oral, Daily    dextrose 10% infusion (TPN no rate), , Intravenous, Continuous PRN    pancrelipase (Lip-Prot-Amyl) (Zenpep) DR particles cap 10,000 Units, 10,000 Units, Per G Tube, PRN **AND** sodium bicarbonate tab 325 mg, 325 mg, Oral, PRN    sodium chloride 3 % nebulizer solution 3 mL, 3 mL, Nebulization, 4 times per day    ipratropium-albuterol (Duoneb) 0.5-2.5 (3) MG/3ML inhalation solution 3 mL, 3 mL, Nebulization, Q6H PRN    albuterol (Ventolin) (2.5 MG/3ML) 0.083% nebulizer solution 2.5 mg, 2.5 mg, Nebulization, 4 times per day    HYDROcodone-acetaminophen (Norco) 5-325 MG per tab 2 tablet, 2 tablet, Oral, Q4H PRN    heparin (Porcine) 5000 UNIT/ML injection 5,000 Units, 5,000 Units, Subcutaneous, Q8H GABRIEL    melatonin tab 3 mg, 3 mg, Oral, Nightly PRN    polyethylene  glycol (PEG 3350) (Miralax) 17 g oral packet 17 g, 17 g, Oral, Daily PRN    bisacodyl (Dulcolax) 10 MG rectal suppository 10 mg, 10 mg, Rectal, Daily PRN    ondansetron (Zofran) 4 MG/2ML injection 4 mg, 4 mg, Intravenous, Q6H PRN    dexmedeTOMIDine in sodium chloride 0.9% (Precedex) 400 mcg/100mL infusion premix, 0.2-1.5 mcg/kg/hr (Dosing Weight), Intravenous, Continuous    norepinephrine (Levophed) 4 mg/250mL infusion premix, 0.5-30 mcg/min, Intravenous, Continuous PRN    potassium chloride 20 mEq/100mL IVPB premix 20 mEq, 20 mEq, Intravenous, PRN **OR** potassium chloride 40 mEq/100mL IVPB premix (central line) 40 mEq, 40 mEq, Intravenous, PRN    magnesium sulfate in dextrose 5% 1 g/100mL infusion premix 1 g, 1 g, Intravenous, PRN    magnesium sulfate in sterile water for injection 2 g/50mL IVPB premix 2 g, 2 g, Intravenous, PRN    chlorhexidine gluconate (Peridex) 0.12 % oral solution 15 mL, 15 mL, Mouth/Throat, BID    morphINE PF 2 MG/ML injection 2 mg, 2 mg, Intravenous, Q2H PRN **OR** [DISCONTINUED] morphINE PF 4 MG/ML injection 4 mg, 4 mg, Intravenous, Q2H PRN    propofol (Diprivan) 10 mg/mL infusion premix, 5-50 mcg/kg/min (Dosing Weight), Intravenous, Continuous    fentaNYL (Sublimaze) 50 mcg/mL injection 25 mcg, 25 mcg, Intravenous, Q3H PRN    famotidine (Pepcid) tab 20 mg, 20 mg, Oral, Daily **OR** famotidine (Pepcid) 20 mg/2mL injection 20 mg, 20 mg, Intravenous, Daily    aspirin chewable tab 81 mg, 81 mg, Oral, Daily    Review of Systems:   Unable to complete.     Physical Exam:    Blood pressure 146/64, pulse 77, temperature 98.5 °F (36.9 °C), temperature source Temporal, resp. rate 19, height 5' 5\" (1.651 m), weight 285 lb 7.9 oz (129.5 kg), SpO2 97%, not currently breastfeeding. Body mass index is 47.51 kg/m².    Gen: no acute distress, intubated, sedated   HEENT: EOMI, the sclera appears anicteric, oropharynx clear, mucus membranes appear moist  CV: RRR  Lung: no conversational dyspnea   Abdomen:  soft NTND abdomen with NABS appreciated   Back: No CVA tenderness   Skin: dry, warm, no jaundice  Ext: no LE edema is evident  Neuro: somnolent     Laboratory Data:  Lab Results   Component Value Date    WBC 11.5 12/03/2024    HGB 8.0 12/03/2024    HCT 25.3 12/03/2024    .0 12/03/2024    CREATSERUM 1.60 12/03/2024    CREATSERUM 1.60 12/03/2024    BUN 33 12/03/2024    BUN 33 12/03/2024     12/03/2024     12/03/2024    K 4.5 12/03/2024    K 4.5 12/03/2024     12/03/2024     12/03/2024    CO2 19.0 12/03/2024    CO2 19.0 12/03/2024     12/03/2024     12/03/2024    CA 8.9 12/03/2024    CA 8.9 12/03/2024    ALB 3.4 12/03/2024    ALKPHO 86 12/03/2024    BILT 0.3 12/03/2024    TP 6.3 12/03/2024    AST 25 12/03/2024    ALT 32 12/03/2024    MG 2.3 12/03/2024    PHOS 5.9 12/03/2024       Imaging:  XR CHEST AP PORTABLE  (CPT=71045)    Result Date: 12/2/2024  CONCLUSION:  1. Redemonstration of multifocal airspace disease, perhaps indicative of infectious process or developing pulmonary alveolar edema. Follow-up is advised to document resolution.  2. Additional support tubes and lines as above.    elm-remote.   Dictated by (CST): Eulalio Shaikh MD on 12/02/2024 at 7:57 AM     Finalized by (CST): Eulalio Shaikh MD on 12/02/2024 at 8:00 AM          XR CHEST AP PORTABLE  (CPT=71045)    Result Date: 12/1/2024  CONCLUSION:  1. Multifocal patchy opacities throughout the right greater than left lungs are similar in comparison to previous day chest radiograph. 2. Stable radiographic appearance of support apparatus.    Dictated by (CST): Evaristo Rosado MD on 12/01/2024 at 9:04 AM     Finalized by (CST): Evaristo Rosado MD on 12/01/2024 at 9:06 AM           Assessment & Plan   Alisha Wilde is a 59 year old female w/ PMHx of hypertension, GERD, CKD, hyperlipidemia who presented to ER 11/21/2024 for chest pain. Underwent emergent repair of aortic type A dissection on 11/21/2024. Has not been  able to weaned from vent. Patient does not answer to questions.     #PEG consult  Called and talked with brother Osbaldo who is patient's POA. Discussed that patient will need PEG to placement to assist with nutrition after the tracheostomy is completed. Discussed the procedure in depth. He is ok with proceeding at this time. He wants everything done to continue his sister's care.  CT a/p completed during admission no abnormalities noted to avoid PEG placement.     EGD/PEG consent: I have discussed the risks, benefits, and alternatives to upper endoscopy WITH gastrostomy placement with the patient [who demonstrated understanding], including but not limited to the risks of bleeding, infection, pain, misplacement, death, as well as the risks of anesthesia and perforation all leading to prolonged hospitalization, surgical intervention, or even death. I also specifically mentioned the miss rate of upper endoscopy of 5-10% in the best of all circumstances. All questions were answered to the patient’s satisfaction. The patient has agreed to sign an informed consent and elected to proceed with upper endoscopy with possible intervention [i.e. polypectomy, ablation, stent placement, etc.] as indicated.      Recommend:  -PEG tube placement day after tracheostomy (tentatively 12/5/2024)      Thank you for the opportunity to participate in the care of this patient.    Case discussed with Toma Barrientos MD and Bryan AGEE.    Kinjal Childs PA-C  [facility] Gastroenterology  12/3/2024         [1]   Allergies  Allergen Reactions    Atorvastatin MYALGIA    Pravastatin MYALGIA    Rosuvastatin MYALGIA    Seasonal Runny nose

## 2024-12-03 NOTE — PLAN OF CARE
Problem: Safety Risk - Non-Violent Restraints  Goal: Patient will remain free from self-harm  Description: INTERVENTIONS:  - Apply the least restrictive restraint to prevent harm  - Notify patient and family of reasons restraints applied  - Assess for any contributing factors to confusion (electrolyte disturbances, delirium, medications)  - Discontinue any unnecessary medical devices as soon as possible  - Assess the patient's physical comfort, circulation, skin condition, hydration, nutrition and elimination needs   - Reorient and redirection as needed  - Assess for the need to continue restraints  12/3/2024 0209 by Vee Vincent, RN  Outcome: Not Progressing  12/3/2024 0126 by Vee Vincent, RN  Outcome: Not Progressing

## 2024-12-03 NOTE — PROGRESS NOTES
[facility]  part of [facility]    Cardiology Progress Note    Alisha Wilde Patient Status:  Inpatient    10/25/1965 MRN [id number]   Location [facility] 2W/SW Attending Aguila Villegas MD   Hosp Day # 11 PCP Bridger Avendano MD     Interval History   Remains intubated  Plan for another trial of SBT later this AM    Assessment and Plan:   Acute hypoxic respiratory failure, c/b aspiration event requiring re-intubation  Type A aortic dissection  NAIMA on CKD-III, improved  Obesity  Hypertension  EtOH abuse  -presented with acute onset CP   -CT with type A aortic dissection above right coronary artery and extending distally into the left common iliac artery and external iliac   -s/p emergent successful repair on 24  -had aspiration event following self-extubation, now re-intubated  -TTE on  with hyperdynamic LVEF  -continue BP management: remains above goal    -on labetalol gtt   -on hydralazine 150mg TID + will increase isosorbide to 40mg TID   -on clonidine 0.2mg/hr patch   -continue amlodipine 10mg daily   -ARB held by nephrology due to NAIMA/CKD  -volume management per nephrology, on lasix  -monitor rhythm on tele    Objective:   Patient Vitals for the past 24 hrs:   BP Temp Temp src Pulse Resp SpO2 Weight   24 1500 119/66 -- -- 70 18 92 % --   24 1400 124/66 -- -- 71 18 95 % --   24 1300 122/66 -- -- 68 18 97 % --   24 1200 124/68 98.4 °F (36.9 °C) Axillary 68 18 97 % --   24 1100 127/75 -- -- 69 18 98 % --   24 1000 129/71 -- -- 69 18 97 % --   24 0900 128/69 -- -- 70 18 97 % --   24 0800 (!) 143/118 97.7 °F (36.5 °C) Temporal 70 18 98 % --   24 0700 130/70 -- -- 69 18 99 % --   24 0621 -- -- -- -- -- -- 282 lb 3 oz (128 kg)   11/26/24 0600 95/53 -- -- 68 21 97 % --   24 0500 121/67 -- -- 70 18 97 % --   24 0400 119/68 98.4 °F (36.9 °C) Temporal 72 18 98 % --   24 0200 109/60 -- -- 72 21 96 %  --   11/26/24 0130 -- -- -- 72 18 97 % --   11/26/24 0100 142/73 -- -- 71 18 98 % --   11/26/24 0030 -- -- -- 71 18 99 % --   11/26/24 0000 139/70 98.3 °F (36.8 °C) Temporal 70 18 98 % --   11/25/24 2300 130/69 -- -- 72 18 98 % --   11/25/24 2200 135/70 -- -- 71 18 98 % --   11/25/24 2100 138/71 -- -- 71 18 99 % --   11/25/24 2000 132/68 98.1 °F (36.7 °C) Temporal 69 18 99 % --   11/25/24 1900 124/64 -- -- 71 18 98 % --   11/25/24 1800 136/72 -- -- 71 18 98 % --   11/25/24 1700 138/70 -- -- 70 18 98 % --       Intake/Output:   Last 3 shifts:   Intake/Output                   11/24/24 0700 - 11/25/24 0659 11/25/24 0700 - 11/26/24 0659 11/26/24 0700 - 11/27/24 0659       Intake    P.O.  0  0  --    P.O. 0 0 --    I.V.  2276.7  1884.9  --    I.V. 154 615 --    Volume (mL) Insulin 53.9 37 --    Volume (mL) Nitroglycerin 236.9 54.5 --    Volume (mL) Dobutamine 4.5 -- --    Volume (mL) Propofol 477.8 713.9 --    Volume (mL) Dexmedetomidine 352.2 384.5 --    Volume (mL) (dextrose 5%-sodium chloride 0.45% infusion) 997.4 80 --    NG/GT  50  150  60    Intake (mL) (NG/OG Tube Orogastric 16 Fr. Right mouth) 50 150 60    IV PIGGYBACK  600  500  --    Volume (mL) (piperacillin-tazobactam (Zosyn) 3.375 g in dextrose 5% 100 mL IVPB-ADDV) 300 200 --    Volume (mL) (acetaminophen (Ofirmev) 10 mg/mL infusion premix 1,000 mg) 200 100 --    Volume (mL) (potassium chloride 20 mEq/100mL IVPB premix 20 mEq) -- 100 --    Volume (mL) (potassium chloride 40 mEq/100mL IVPB premix (central line) 40 mEq) 100 100 --    Total Intake 2926.7 2534.9 60       Output    Urine  1800  3570  800    Output (mL) (Urethral Catheter Latex;Temperature probe;Double-lumen) 1800 3570 800    Emesis/NG output  550  365  --    Residual volume (ml) (NG/OG Tube Orogastric 16 Fr. Right mouth) -- 5 --    Output (mL) (NG/OG Tube Orogastric 16 Fr. Right mouth) 550 360 --    Chest Tube  188  125  30    Output (mL) ([REMOVED] Chest Tube Anterior Mediastinal 32 Fr.) 48 50  --    Output (mL) (Chest Tube Anterior Pericardial 24 Fr.) 140 75 30    Total Output 2538 4060 830       Net I/O     388.7 -1525.1 -770             Vent Settings: Vent Mode: VC+  FiO2 (%):  [30 %-40 %] 30 %  S RR:  [18] 18  S VT:  [550 mL] 550 mL  PEEP/CPAP (cm H2O):  [5 cm H20] 5 cm H20  MAP (cm H2O):  [8-13] 8    Hemodynamic parameters (last 24 hours):      Scheduled Meds:    metoclopramide  5 mg Intravenous q6h    docusate  100 mg Oral BID    sodium ferric gluconate  125 mg Intravenous Daily    [Held by provider] furosemide  40 mg Intravenous Daily    hydrALAZINE  150 mg Oral Q8H [facility]    cloNIDine  1 patch Transdermal Weekly    isosorbide dinitrate  20 mg Per NG Tube TID (Nitrates)    insulin regular human  1-5 Units Subcutaneous 4 times per day    carvedilol  6.25 mg Oral BID with meals    amLODIPine  10 mg Oral Daily    [Held by provider] losartan  50 mg Oral Daily    sodium chloride  3 mL Nebulization 4 times per day    albuterol  2.5 mg Nebulization 4 times per day    heparin  5,000 Units Subcutaneous Q8H [facility]    chlorhexidine gluconate  15 mL Mouth/Throat BID    famotidine  20 mg Oral Daily    Or    famotidine  20 mg Intravenous Daily    aspirin  81 mg Oral Daily       Continuous Infusions:    labetalol (Trandate) 400 mg in sodium chloride 0.9% 200 mL infusion 1 mg/min (12/03/24 0201)    dextrose 10%      dexmedetomidine 0.2 mcg/kg/hr (12/03/24 0943)    norepinephrine Stopped (11/23/24 2225)    propofol 45 mcg/kg/min (12/03/24 1037)       Results:     Lab Results   Component Value Date    WBC 11.5 (H) 12/03/2024    HGB 8.0 (L) 12/03/2024    HCT 25.3 (L) 12/03/2024    .0 12/03/2024    CREATSERUM 1.60 (H) 12/03/2024    CREATSERUM 1.60 (H) 12/03/2024    BUN 33 (H) 12/03/2024    BUN 33 (H) 12/03/2024     12/03/2024     12/03/2024    K 4.5 12/03/2024    K 4.5 12/03/2024     (H) 12/03/2024     (H) 12/03/2024    CO2 19.0 (L) 12/03/2024    CO2 19.0 (L) 12/03/2024     (H)  12/03/2024     (H) 12/03/2024    CA 8.9 12/03/2024    CA 8.9 12/03/2024    ALB 3.4 12/03/2024    ALKPHO 86 12/03/2024    BILT 0.3 12/03/2024    TP 6.3 12/03/2024    AST 25 12/03/2024    ALT 32 12/03/2024    PTT 28.2 11/22/2024    INR 1.25 (H) 11/22/2024    TSH 0.704 09/29/2024    NATHALIE 99 11/25/2024    LIP 28 11/25/2024    DDIMER 0.42 12/08/2019    ESRML 15 06/05/2021    MG 2.3 12/03/2024    PHOS 5.9 (H) 12/03/2024    TROP <0.045 12/08/2019     (H) 09/23/2021    B12 1,156 (H) 07/23/2018       Recent Labs   Lab 12/01/24  0510 12/02/24  0434 12/02/24  0636 12/03/24 0312   * 140*  --  130*  130*   BUN 31*  --  29* 33*  33*   CREATSERUM 1.39* 1.46*  --  1.60*  1.60*   CA 9.0 9.1  --  8.9  8.9    140  --  140  140   K 4.1  --  4.6 4.5  4.5   * 110  --  113*  113*   CO2 20.0* 20.0*  --  19.0*  19.0*     Recent Labs   Lab 12/01/24  0510 12/02/24  0432 12/03/24 0312   RBC 2.78* 3.09* 3.02*   HGB 7.7* 8.3* 8.0*   HCT 23.0* 25.1* 25.3*   MCV 82.7 81.2 83.8   MCH 27.7 26.9 26.5   MCHC 33.5 33.1 31.6   RDW 16.2* 16.3* 16.5*   NEPRELIM 7.40 7.08 7.99*   WBC 11.9* 10.4 11.5*   .0 211.0 194.0       No results for input(s): \"BNPML\" in the last 168 hours.    No results for input(s): \"TROP\", \"CK\" in the last 168 hours.    XR CHEST AP PORTABLE  (CPT=71045)    Result Date: 12/2/2024  CONCLUSION:  1. Redemonstration of multifocal airspace disease, perhaps indicative of infectious process or developing pulmonary alveolar edema. Follow-up is advised to document resolution.  2. Additional support tubes and lines as above.    elm-remote.   Dictated by (CST): Eulalio Shaikh MD on 12/02/2024 at 7:57 AM     Finalized by (CST): Eulalio Shaikh MD on 12/02/2024 at 8:00 AM          XR CHEST AP PORTABLE  (CPT=71045)    Result Date: 12/1/2024  CONCLUSION:  1. Multifocal patchy opacities throughout the right greater than left lungs are similar in comparison to previous day chest radiograph. 2. Stable  radiographic appearance of support apparatus.    Dictated by (CST): Evaristo Rosado MD on 12/01/2024 at 9:04 AM     Finalized by (CST): Evaristo Rosado MD on 12/01/2024 at 9:06 AM                    Exam:     Physical Exam:  General: intubated  HEENT: Normocephalic, anicteric sclera, neck supple, no thyromegaly or adenopathy.  Neck: No JVD, carotids 2+, no bruits.  Cardiac: Regular rate and rhythm. S1, S2 normal.   Lungs: Clear without wheezes, rales, rhonchi or dullness.    Abdomen: Soft, non-tender. No organosplenomegally, mass or rebound, BS-present.  Extremities: Without clubbing or cyanosis. No edema.    Neurologic: unable to obtain  Skin: Warm and dry.     Edward Montgomery, [facility] [place]

## 2024-12-03 NOTE — PROGRESS NOTES
[facility]  part of [facility]    Progress Note    Alisha Wilde Patient Status:  Inpatient    10/25/1965 MRN [id number]   Location [facility] 2W/SW Attending Hayde Brito MD   Hosp Day # 11 PCP Bridger Avendano MD     Chief Complaint:   Chief Complaint   Patient presents with    Chest Pain Angina       Subjective:   Alisha Wilde is intubated sedated. Tolerated a little over 30 min on SBT but was tiring out. Lots of thick copious secretions. Placed back on vent.     Objective:   Objective:    Blood pressure 125/54, pulse 77, temperature 98.5 °F (36.9 °C), temperature source Temporal, resp. rate 26, height 5' 5\" (1.651 m), weight 285 lb 7.9 oz (129.5 kg), SpO2 96%, not currently breastfeeding.    Physical Exam:    General: No acute distress. Intubated.   Respiratory: Diminished bases   Cardiovascular: S1, S2. Regular rate and rhythm. No murmurs, rubs or gallops.   Abdomen: Soft, nontender, distended.  Positive bowel sounds. No rebound or guarding.  Extremities: 2+ pitting edema x 4 ext    Results:   Results:    Labs:  Recent Labs   Lab 24  1307 24  0457 24  0510 24  0432 24  0312   WBC 9.4 12.2* 11.9* 10.4 11.5*   HGB 8.6* 8.8* 7.7* 8.3* 8.0*   MCV 81.9 83.2 82.7 81.2 83.8   .0 169.0 168.0 211.0 194.0   BAND  --  5 3 10 3       Recent Labs   Lab 24  0457 24  0825 24  0510 24  0434 24  0636 24  0312   *   < > 143* 140*  --  130*  130*   BUN 30*   < > 31*  --  29* 33*  33*   CREATSERUM 1.41*   < > 1.39* 1.46*  --  1.60*  1.60*   CA 9.5   < > 9.0 9.1  --  8.9  8.9   ALB 3.5  --   --  3.6  --  3.4      < > 143 140  --  140  140   K 4.6   < > 4.1  --  4.6 4.5  4.5   *   < > 114* 110  --  113*  113*   CO2 20.0*   < > 20.0* 20.0*  --  19.0*  19.0*   ALKPHO  --   --   --   --   --  86   AST  --   --   --   --   --  25   ALT  --   --   --   --   --  32   BILT  --   --   --   --   --  0.3   TP   --   --   --   --   --  6.3    < > = values in this interval not displayed.       Estimated Creatinine Clearance: 33.2 mL/min (A) (based on SCr of 1.6 mg/dL (H)).    No results for input(s): \"PTP\", \"INR\" in the last 168 hours.           Culture:  No results found for this visit on 11/21/24.    Cardiac  No results for input(s): \"TROP\", \"PBNP\" in the last 168 hours.        Imaging: Imaging data reviewed in Track[facility].  XR CHEST AP PORTABLE  (CPT=71045)    Result Date: 12/2/2024  CONCLUSION:  1. Redemonstration of multifocal airspace disease, perhaps indicative of infectious process or developing pulmonary alveolar edema. Follow-up is advised to document resolution.  2. Additional support tubes and lines as above.    elm-remote.   Dictated by (CST): Eulalio Shaikh MD on 12/02/2024 at 7:57 AM     Finalized by (CST): Eulalio Shaikh MD on 12/02/2024 at 8:00 AM           Medications:    metoclopramide  5 mg Intravenous q6h    docusate  100 mg Oral BID    sodium ferric gluconate  125 mg Intravenous Daily    isosorbide dinitrate  40 mg Per NG Tube TID (Nitrates)    [Held by provider] furosemide  40 mg Intravenous Daily    hydrALAZINE  150 mg Oral Q8H [facility]    cloNIDine  1 patch Transdermal Weekly    insulin regular human  1-5 Units Subcutaneous 4 times per day    carvedilol  6.25 mg Oral BID with meals    amLODIPine  10 mg Oral Daily    [Held by provider] losartan  50 mg Oral Daily    sodium chloride  3 mL Nebulization 4 times per day    albuterol  2.5 mg Nebulization 4 times per day    heparin  5,000 Units Subcutaneous Q8H [facility]    chlorhexidine gluconate  15 mL Mouth/Throat BID    famotidine  20 mg Oral Daily    Or    famotidine  20 mg Intravenous Daily    aspirin  81 mg Oral Daily         Assessment and Plan:   Assessment & Plan:      Type A aortic dissection   S/p emergent repair 11/22/24   Off NTG gtt now on labetalol gtt  CV surgery, cards and critical care on consult.   TTE LVEF 75-80%.   Cont BP control.   Acute hypoxic  respiratory failure   Aspiration event   Completed 10 days of zosyn.   Sputum cx candida   Failed SBT multiple times. Plans for trach tomorrow. PEG 12/5   ENT and GI on consult.   Pulmonary on consult.   hold lasix as creat creeping up   CXR with multifocal airspace dz  Albuterol nebs   H/o tobacco and ETOH abuse   Duonebs PRN   NAIMA on CKD III   Renal on consult.   Greer to be secondary to MARY LOU/ATN   BUN/creat trending up. Hold lasix.   Essential HTN   Coreg 6.25mg BID, norvasc 10mg daily, hydralazine 150mg TID. Clonidinie patch 0.2mg daily isosorbide 40mg TID.   Stop lasix.   ARB on hold due to NAIMA.   Labetalol gtt wean as able.   Iron def anemia  IV iron.   Iron level low   Transfuse for Hb < 7.0   Other medical problems  Morbid Obesity   TANYA     >55min spent, >50% spent counseling and coordinating care in the form of educating pt/family and d/w consultants and staff. Most of the time spent discussing the above plan.        Plan of care discussed with patient or family at bedside.    Hayde Brito MD  Hospitalist          Supplementary Documentation:     Quality:  DVT Prophylaxis: heparin   CODE status: Full  Dispo: per clinical course            Estimated date of discharge: TBD  Discharge is dependent on: clinical stability  At this point Ms. Wilde is expected to be discharge to: TBD

## 2024-12-03 NOTE — CONSULTS
[facility]  part of [facility]      Consult     Alisha Wilde Patient Status:  Inpatient    10/25/1965 MRN [id number]   Location [facility] 2W/SW Attending Hayde Brito MD   Hosp Day # 11 PCP Bridger Avendano MD     Referring Provider: Hayde Brito MD  Reason for Consultation: tracheostomy     Subjective:    Alisha Wilde is a 59 year old female who underwent emergent repair of aortic type A dissection on 2024. Patient has been intubated requiring ventilatory support since that time. Patient has failed multiple spontaneous breathing trials. Factors of possible aspiration PNA vs pneumonitis, fluid overload, TANYA, smoking history contributing to acute respiratory failure.     History/Other:      Past Medical History:  Past Medical History:    Chronic kidney disease (CKD)    Esophageal reflux    High blood pressure    High cholesterol    HYPERTENSION    Hypertension    Unspecified essential hypertension        Past Surgical History:   Past Surgical History:   Procedure Laterality Date    Colonoscopy N/A 2018    Procedure: COLONOSCOPY;  Surgeon: Magdy Webber MD;  Location: [facility] ENDOSCOPY    Endometrial ablation      child passed away for 5 mins          1    Other surgical history  11    cysto-Dr. Lacey -- pt denies       Social History:  reports that she quit smoking about 25 years ago. Her smoking use included cigarettes. She started smoking about 38 years ago. She has a 13 pack-year smoking history. She has quit using smokeless tobacco. She reports current alcohol use of about 1.0 - 2.0 standard drink of alcohol per week. She reports that she does not use drugs.    Family History:   Family History   Problem Relation Age of Onset    Hypertension Mother     Heart Disorder Mother 70    Other (Other) Mother         kidney failure    Hypertension Father     Hypertension Maternal Grandfather     Cancer Neg     Diabetes Neg     Glaucoma Neg     Macular  degeneration Neg        Allergies: Allergies[1]    Medications:  Medications Ordered Prior to Encounter[2]    Review of Systems:   Review of systems was completed.  Pertinent positives and negatives noted in the HPI.    Objective:     /64   Pulse 77   Temp 98.5 °F (36.9 °C) (Temporal)   Resp 19   Ht 5' 5\" (1.651 m)   Wt 285 lb 7.9 oz (129.5 kg)   SpO2 97%   BMI 47.51 kg/m²   Vent Mode: VC+  FiO2 (%):  [30 %-40 %] 30 %  S RR:  [18] 18  S VT:  [550 mL] 550 mL  PEEP/CPAP (cm H2O):  [5 cm H20] 5 cm H20  MAP (cm H2O):  [8-13] 8    General: No acute distress.  Alert , Intubated and sedated, central venous catheter in place  , morbidly obese  EXAMINATION:  I washed my hands with an alcohol-based hand gel prior to examination  Constitutional:   --Vitals: Blood pressure 125/54, pulse 77, temperature 98.5 °F (36.9 °C), temperature source Temporal, resp. rate 26, height 5' 5\" (1.651 m), weight 285 lb 7.9 oz (129.5 kg), SpO2 96%, not currently breastfeeding.  General: no apparent distress, morbid obesity  Respiratory: On ventilatory support  ENT:  --OC/OP: Tongue is edematous and protruding from the mouth. 7.5 ETT in place via the oral cavity, 26cm at the lip   --Neck: difficult to fully assess due to patient positioning and habitus; obese short neck, sternal notch and thyroid notch palpable   --Chest: sternotomy incision     Results:    Labs:  Laboratory data reviewed.      Selected labs - last 24 hours:  Endo  Lytes  Renal   Glu 130; 130  Na 140; 140 Ca 8.9; 8.9  BUN 33; 33   POC Gluc  137  K 4.5; 4.5 PO4 5.9  Cr 1.60; 1.60   A1c -  Cl 113; 113 Mg 2.3  eGFR 36; 36   TSH -  CO2 19.0; 19.0         LFT  CBC  Other   AST 25  WBC 11.5  PTT - Procal -   ALT 32  Hb 8.0  INR - CRP -   APk 86  Hct 25.3  Trop - D dim -   T miranda 0.3  .0  pBNP -  BNP -  - Ferritin  -   Prot 6.3    CK  - Lactate  -   Alb 3.4    LDL  - COVID  -     Recent Labs   Lab 12/03/24  1026   ABGPHT 7.39   RLYRBF1Y 28*   EWQHP0Q 68*   ABGHCO3  19.4*   SITE Arterial Line   GB 10.0*       Imaging: Imaging data reviewed in Epic.    Assessment & Plan:    #Acute respiratory failure  #Prolonged intubation  #Aortic dissection type A s/p emergent repair on 11/21/2024    -Recommend OR for tracheostomy if patient continues to be unable to be extubated   -Risks are noted to include risks of bleeding, infection, loss of airway possibly resulting in death, pain, damage to surround structures including but not limited to the airway, pharynx, esophagus.  -Discussed option of tracheostomy with the patient's brother. He is agreeable to proceed if needed. I will plan to speak to him in greater detail if we proceed. He has limited availability due to work and is available after 7PM.     Plan of care discussed with CV surgery KATHERINE Cai MD  12/3/2024    Supplementary Documentation:                            [1]   Allergies  Allergen Reactions    Atorvastatin MYALGIA    Pravastatin MYALGIA    Rosuvastatin MYALGIA    Seasonal Runny nose   [2]   No current facility-administered medications on file prior to encounter.     Current Outpatient Medications on File Prior to Encounter   Medication Sig Dispense Refill    Acetaminophen ER (TYLENOL 8 HOUR) 650 MG Oral Tab CR Take 2 tablets (1,300 mg total) by mouth daily as needed.      Calcium Carb-Cholecalciferol 600-10 MG-MCG Oral Tab Take 1 tablet by mouth daily.      metoprolol succinate ER 50 MG Oral Tablet 24 Hr Take 1 tablet (50 mg total) by mouth daily. 90 tablet 3    Losartan Potassium-HCTZ 100-12.5 MG Oral Tab Take 1 tablet by mouth daily. 90 tablet 3    Potassium Chloride ER 20 MEQ Oral Tab CR Take 1 tablet by mouth daily. 90 tablet 3    albuterol 108 (90 Base) MCG/ACT Inhalation Aero Soln Inhale 2 puffs into the lungs every 4 (four) hours as needed for Wheezing. 3 each 3    amLODIPine 10 MG Oral Tab Take 1 tablet (10 mg total) by mouth daily. 90 tablet 3    Ascorbic Acid (VITAMIN C) 1000 MG Oral Tab  Take 1 tablet (1,000 mg total) by mouth daily.      vitamin B-12 50 MCG Oral Tab Take 1 tablet (50 mcg total) by mouth daily.      fluticasone propionate 50 MCG/ACT Nasal Suspension 2 sprays by Nasal route daily. 3 each 1    fluticasone-salmeterol 250-50 MCG/ACT Inhalation Aerosol Powder, Breath Activated Inhale 1 puff into the lungs 2 (two) times daily. inhale 1 puff by INHALATION route 2 times every day morning and evening approximately 12 hours apart (Patient not taking: Reported on 11/21/2024) 1 each 5

## 2024-12-04 ENCOUNTER — ANESTHESIA (OUTPATIENT)
Dept: SURGERY | Facility: HOSPITAL | Age: 59
End: 2024-12-04
Payer: COMMERCIAL

## 2024-12-04 ENCOUNTER — APPOINTMENT (OUTPATIENT)
Dept: GENERAL RADIOLOGY | Facility: HOSPITAL | Age: 59
DRG: 003 | End: 2024-12-04
Attending: REGISTERED NURSE
Payer: COMMERCIAL

## 2024-12-04 ENCOUNTER — APPOINTMENT (OUTPATIENT)
Dept: GENERAL RADIOLOGY | Facility: HOSPITAL | Age: 59
DRG: 003 | End: 2024-12-04
Attending: INTERNAL MEDICINE
Payer: COMMERCIAL

## 2024-12-04 ENCOUNTER — APPOINTMENT (OUTPATIENT)
Dept: GENERAL RADIOLOGY | Facility: HOSPITAL | Age: 59
End: 2024-12-04
Attending: NURSE PRACTITIONER
Payer: COMMERCIAL

## 2024-12-04 ENCOUNTER — APPOINTMENT (OUTPATIENT)
Dept: GENERAL RADIOLOGY | Facility: HOSPITAL | Age: 59
DRG: 003 | End: 2024-12-04
Attending: NURSE PRACTITIONER
Payer: COMMERCIAL

## 2024-12-04 ENCOUNTER — APPOINTMENT (OUTPATIENT)
Dept: GENERAL RADIOLOGY | Facility: HOSPITAL | Age: 59
End: 2024-12-04
Attending: REGISTERED NURSE
Payer: COMMERCIAL

## 2024-12-04 ENCOUNTER — APPOINTMENT (OUTPATIENT)
Dept: GENERAL RADIOLOGY | Facility: HOSPITAL | Age: 59
End: 2024-12-04
Attending: INTERNAL MEDICINE
Payer: COMMERCIAL

## 2024-12-04 LAB
ALBUMIN SERPL-MCNC: 3.5 G/DL (ref 3.2–4.8)
ANION GAP SERPL CALC-SCNC: 10 MMOL/L (ref 0–18)
ANION GAP SERPL CALC-SCNC: 11 MMOL/L (ref 0–18)
ANTIBODY SCREEN: NEGATIVE
APTT PPP: 30.7 SECONDS (ref 23–36)
BASE EXCESS BLD CALC-SCNC: -9.6 MMOL/L (ref ?–2)
BASOPHILS # BLD: 0.12 X10(3) UL (ref 0–0.2)
BASOPHILS NFR BLD: 1 %
BUN BLD-MCNC: 42 MG/DL (ref 9–23)
BUN BLD-MCNC: 44 MG/DL (ref 9–23)
BUN/CREAT SERPL: 24.7 (ref 10–20)
BUN/CREAT SERPL: 25.5 (ref 10–20)
CA-I BLD-SCNC: 1.21 MMOL/L (ref 0.95–1.32)
CALCIUM BLD-MCNC: 9 MG/DL (ref 8.7–10.4)
CALCIUM BLD-MCNC: 9.2 MG/DL (ref 8.7–10.4)
CHLORIDE SERPL-SCNC: 111 MMOL/L (ref 98–112)
CHLORIDE SERPL-SCNC: 114 MMOL/L (ref 98–112)
CO2 SERPL-SCNC: 17 MMOL/L (ref 21–32)
CO2 SERPL-SCNC: 17 MMOL/L (ref 21–32)
COHGB MFR BLD: 2.2 % (ref 0–3)
CREAT BLD-MCNC: 1.65 MG/DL
CREAT BLD-MCNC: 1.78 MG/DL
DEPRECATED RDW RBC AUTO: 50.6 FL (ref 35.1–46.3)
DEPRECATED RDW RBC AUTO: 52.6 FL (ref 35.1–46.3)
EGFRCR SERPLBLD CKD-EPI 2021: 32 ML/MIN/1.73M2 (ref 60–?)
EGFRCR SERPLBLD CKD-EPI 2021: 35 ML/MIN/1.73M2 (ref 60–?)
EOSINOPHIL # BLD: 0.36 X10(3) UL (ref 0–0.7)
EOSINOPHIL NFR BLD: 3 %
ERYTHROCYTE [DISTWIDTH] IN BLOOD BY AUTOMATED COUNT: 16.6 % (ref 11–15)
ERYTHROCYTE [DISTWIDTH] IN BLOOD BY AUTOMATED COUNT: 16.9 % (ref 11–15)
GLUCOSE BLD-MCNC: 118 MG/DL (ref 70–99)
GLUCOSE BLD-MCNC: 128 MG/DL (ref 70–99)
GLUCOSE BLDC GLUCOMTR-MCNC: 116 MG/DL (ref 70–99)
GLUCOSE BLDC GLUCOMTR-MCNC: 127 MG/DL (ref 70–99)
GLUCOSE BLDC GLUCOMTR-MCNC: 131 MG/DL (ref 70–99)
HCO3 BLDA-SCNC: 17.4 MEQ/L (ref 21–27)
HCT VFR BLD AUTO: 24.2 %
HCT VFR BLD AUTO: 26.8 %
HGB BLD-MCNC: 7.7 G/DL
HGB BLD-MCNC: 8.3 G/DL
HGB BLD-MCNC: 8.7 G/DL
INR BLD: 1.17 (ref 0.8–1.2)
LACTATE BLD-SCNC: 0.8 MMOL/L (ref 0.5–2)
LYMPHOCYTES NFR BLD: 0.71 X10(3) UL (ref 1–4)
LYMPHOCYTES NFR BLD: 6 %
MAGNESIUM SERPL-MCNC: 2.5 MG/DL (ref 1.6–2.6)
MCH RBC QN AUTO: 26.6 PG (ref 26–34)
MCH RBC QN AUTO: 27.5 PG (ref 26–34)
MCHC RBC AUTO-ENTMCNC: 31.8 G/DL (ref 31–37)
MCHC RBC AUTO-ENTMCNC: 32.5 G/DL (ref 31–37)
MCV RBC AUTO: 83.7 FL
MCV RBC AUTO: 84.8 FL
METAMYELOCYTES # BLD: 0.48 X10(3) UL
METAMYELOCYTES NFR BLD: 4 %
METHGB MFR BLD: 1.3 % SAT (ref 0.4–1.5)
MONOCYTES # BLD: 0.12 X10(3) UL (ref 0.1–1)
MONOCYTES NFR BLD: 1 %
NEUTROPHILS # BLD AUTO: 8.52 X10 (3) UL (ref 1.5–7.7)
NEUTROPHILS NFR BLD: 81 %
NEUTS BAND NFR BLD: 4 %
NEUTS HYPERSEG # BLD: 10.12 X10(3) UL (ref 1.5–7.7)
O2 CT BLD-SCNC: 11.3 VOL% (ref 15–23)
O2/TOTAL GAS SETTING VFR VENT: 50 %
OSMOLALITY SERPL CALC.SUM OF ELEC: 300 MOSM/KG (ref 275–295)
OSMOLALITY SERPL CALC.SUM OF ELEC: 304 MOSM/KG (ref 275–295)
PCO2 BLDA: 30 MM HG (ref 35–45)
PEEP SETTING VENT: 5 CM H2O
PH BLDA: 7.32 [PH] (ref 7.35–7.45)
PHOSPHATE SERPL-MCNC: 5.3 MG/DL (ref 2.4–5.1)
PLATELET # BLD AUTO: 216 10(3)UL (ref 150–450)
PLATELET # BLD AUTO: 238 10(3)UL (ref 150–450)
PLATELET MORPHOLOGY: NORMAL
PO2 BLDA: 106 MM HG (ref 80–100)
POTASSIUM BLD-SCNC: 4.5 MMOL/L (ref 3.6–5.1)
POTASSIUM SERPL-SCNC: 4.4 MMOL/L (ref 3.5–5.1)
POTASSIUM SERPL-SCNC: 4.5 MMOL/L (ref 3.5–5.1)
PROTHROMBIN TIME: 15.6 SECONDS (ref 11.6–14.8)
PUNCTURE CHARGE: NO
RBC # BLD AUTO: 2.89 X10(6)UL
RBC # BLD AUTO: 3.16 X10(6)UL
RESP RATE: 18 BPM
RH BLOOD TYPE: POSITIVE
SAO2 % BLDA: 98.8 % (ref 94–100)
SODIUM BLD-SCNC: 135 MMOL/L (ref 135–145)
SODIUM SERPL-SCNC: 139 MMOL/L (ref 136–145)
SODIUM SERPL-SCNC: 141 MMOL/L (ref 136–145)
SPECIMEN VOL 24H UR: 550 ML
TOTAL CELLS COUNTED BLD: 100
WBC # BLD AUTO: 11.9 X10(3) UL (ref 4–11)
WBC # BLD AUTO: 13.4 X10(3) UL (ref 4–11)

## 2024-12-04 PROCEDURE — 31600 PLANNED TRACHEOSTOMY: CPT | Performed by: OTOLARYNGOLOGY

## 2024-12-04 PROCEDURE — 99233 SBSQ HOSP IP/OBS HIGH 50: CPT | Performed by: HOSPITALIST

## 2024-12-04 PROCEDURE — 99233 SBSQ HOSP IP/OBS HIGH 50: CPT | Performed by: INTERNAL MEDICINE

## 2024-12-04 PROCEDURE — 99232 SBSQ HOSP IP/OBS MODERATE 35: CPT | Performed by: REGISTERED NURSE

## 2024-12-04 PROCEDURE — 71045 X-RAY EXAM CHEST 1 VIEW: CPT | Performed by: NURSE PRACTITIONER

## 2024-12-04 PROCEDURE — 0B110F4 BYPASS TRACHEA TO CUTANEOUS WITH TRACHEOSTOMY DEVICE, OPEN APPROACH: ICD-10-PCS | Performed by: OTOLARYNGOLOGY

## 2024-12-04 PROCEDURE — 71045 X-RAY EXAM CHEST 1 VIEW: CPT | Performed by: INTERNAL MEDICINE

## 2024-12-04 PROCEDURE — 99291 CRITICAL CARE FIRST HOUR: CPT | Performed by: INTERNAL MEDICINE

## 2024-12-04 PROCEDURE — 74018 RADEX ABDOMEN 1 VIEW: CPT | Performed by: REGISTERED NURSE

## 2024-12-04 RX ORDER — FUROSEMIDE 10 MG/ML
40 INJECTION INTRAMUSCULAR; INTRAVENOUS ONCE
Status: COMPLETED | OUTPATIENT
Start: 2024-12-04 | End: 2024-12-04

## 2024-12-04 RX ORDER — ALBUTEROL SULFATE 0.83 MG/ML
SOLUTION RESPIRATORY (INHALATION)
Status: COMPLETED
Start: 2024-12-04 | End: 2024-12-04

## 2024-12-04 RX ORDER — ASPIRIN 300 MG/1
300 SUPPOSITORY RECTAL DAILY
Status: DISCONTINUED | OUTPATIENT
Start: 2024-12-05 | End: 2024-12-06

## 2024-12-04 RX ORDER — LIDOCAINE HYDROCHLORIDE AND EPINEPHRINE 10; 10 MG/ML; UG/ML
INJECTION, SOLUTION INFILTRATION; PERINEURAL AS NEEDED
Status: DISCONTINUED | OUTPATIENT
Start: 2024-12-04 | End: 2024-12-04 | Stop reason: HOSPADM

## 2024-12-04 RX ORDER — CEFAZOLIN SODIUM IN 0.9 % NACL 3 G/100 ML
INTRAVENOUS SOLUTION, PIGGYBACK (ML) INTRAVENOUS AS NEEDED
Status: DISCONTINUED | OUTPATIENT
Start: 2024-12-04 | End: 2024-12-04 | Stop reason: SURG

## 2024-12-04 RX ORDER — PHENYLEPHRINE HCL 10 MG/ML
VIAL (ML) INJECTION AS NEEDED
Status: DISCONTINUED | OUTPATIENT
Start: 2024-12-04 | End: 2024-12-04 | Stop reason: SURG

## 2024-12-04 RX ORDER — SODIUM CHLORIDE 9 MG/ML
INJECTION, SOLUTION INTRAVENOUS ONCE
Status: COMPLETED | OUTPATIENT
Start: 2024-12-04 | End: 2024-12-04

## 2024-12-04 RX ORDER — SODIUM CHLORIDE, SODIUM LACTATE, POTASSIUM CHLORIDE, CALCIUM CHLORIDE 600; 310; 30; 20 MG/100ML; MG/100ML; MG/100ML; MG/100ML
INJECTION, SOLUTION INTRAVENOUS CONTINUOUS PRN
Status: DISCONTINUED | OUTPATIENT
Start: 2024-12-04 | End: 2024-12-04 | Stop reason: SURG

## 2024-12-04 RX ORDER — MIDAZOLAM HYDROCHLORIDE 1 MG/ML
INJECTION INTRAMUSCULAR; INTRAVENOUS AS NEEDED
Status: DISCONTINUED | OUTPATIENT
Start: 2024-12-04 | End: 2024-12-04 | Stop reason: SURG

## 2024-12-04 RX ORDER — HYDRALAZINE HYDROCHLORIDE 20 MG/ML
10 INJECTION INTRAMUSCULAR; INTRAVENOUS EVERY 6 HOURS PRN
Status: DISCONTINUED | OUTPATIENT
Start: 2024-12-04 | End: 2024-12-09

## 2024-12-04 RX ORDER — ROCURONIUM BROMIDE 10 MG/ML
INJECTION, SOLUTION INTRAVENOUS AS NEEDED
Status: DISCONTINUED | OUTPATIENT
Start: 2024-12-04 | End: 2024-12-04 | Stop reason: SURG

## 2024-12-04 RX ADMIN — ROCURONIUM BROMIDE 50 MG: 10 INJECTION, SOLUTION INTRAVENOUS at 07:30:00

## 2024-12-04 RX ADMIN — MIDAZOLAM HYDROCHLORIDE 2 MG: 1 INJECTION INTRAMUSCULAR; INTRAVENOUS at 07:25:00

## 2024-12-04 RX ADMIN — SODIUM CHLORIDE, SODIUM LACTATE, POTASSIUM CHLORIDE, CALCIUM CHLORIDE: 600; 310; 30; 20 INJECTION, SOLUTION INTRAVENOUS at 07:25:00

## 2024-12-04 RX ADMIN — PHENYLEPHRINE HCL 100 MCG: 10 MG/ML VIAL (ML) INJECTION at 07:36:00

## 2024-12-04 RX ADMIN — CEFAZOLIN SODIUM IN 0.9 % NACL 3 G: 3 G/100 ML INTRAVENOUS SOLUTION, PIGGYBACK (ML) INTRAVENOUS at 07:39:00

## 2024-12-04 NOTE — PLAN OF CARE
Received patient intubated and sedated.  Hemodynamically unstable, still requiring IV medications.  Attempted SBT x2 today, patient failed.  EENT consulted, plan for trach tomorrow, NPO after midnight.  GI consulted for PEG tube placement, no time yet.  Family aware of plan of care.    Problem: Diabetes/Glucose Control  Goal: Glucose maintained within prescribed range  Description: INTERVENTIONS:  - Monitor Blood Glucose as ordered  - Assess for signs and symptoms of hyperglycemia and hypoglycemia  - Administer ordered medications to maintain glucose within target range  - Assess barriers to adequate nutritional intake and initiate nutrition consult as needed  - Instruct patient on self management of diabetes  Outcome: Progressing     Problem: CARDIOVASCULAR - ADULT  Goal: Maintains optimal cardiac output and hemodynamic stability  Description: INTERVENTIONS:  - Monitor vital signs, rhythm, and trends  - Monitor for bleeding, hypotension and signs of decreased cardiac output  - Evaluate effectiveness of vasoactive medications to optimize hemodynamic stability  - Monitor arterial and/or venous puncture sites for bleeding and/or hematoma  - Assess quality of pulses, skin color and temperature  - Assess for signs of decreased coronary artery perfusion - ex. Angina  - Evaluate fluid balance, assess for edema, trend weights  Outcome: Progressing  Goal: Absence of cardiac arrhythmias or at baseline  Description: INTERVENTIONS:  - Continuous cardiac monitoring, monitor vital signs, obtain 12 lead EKG if indicated  - Evaluate effectiveness of antiarrhythmic and heart rate control medications as ordered  - Initiate emergency measures for life threatening arrhythmias  - Monitor electrolytes and administer replacement therapy as ordered  Outcome: Progressing     Problem: RESPIRATORY - ADULT  Goal: Achieves optimal ventilation and oxygenation  Description: INTERVENTIONS:  - Assess for changes in respiratory status  - Assess for  changes in mentation and behavior  - Position to facilitate oxygenation and minimize respiratory effort  - Oxygen supplementation based on oxygen saturation or ABGs  - Provide Smoking Cessation handout, if applicable  - Encourage broncho-pulmonary hygiene including cough, deep breathe, Incentive Spirometry  - Assess the need for suctioning and perform as needed  - Assess and instruct to report SOB or any respiratory difficulty  - Respiratory Therapy support as indicated  - Manage/alleviate anxiety  - Monitor for signs/symptoms of CO2 retention  Outcome: Progressing     Problem: GASTROINTESTINAL - ADULT  Goal: Minimal or absence of nausea and vomiting  Description: INTERVENTIONS:  - Maintain adequate hydration with IV or PO as ordered and tolerated  - Nasogastric tube to low intermittent suction as ordered  - Evaluate effectiveness of ordered antiemetic medications  - Provide nonpharmacologic comfort measures as appropriate  - Advance diet as tolerated, if ordered  - Obtain nutritional consult as needed  - Evaluate fluid balance  Outcome: Progressing  Goal: Maintains or returns to baseline bowel function  Description: INTERVENTIONS:  - Assess bowel function  - Maintain adequate hydration with IV or PO as ordered and tolerated  - Evaluate effectiveness of GI medications  - Encourage mobilization and activity  - Obtain nutritional consult as needed  - Establish a toileting routine/schedule  - Consider collaborating with pharmacy to review patient's medication profile  Outcome: Progressing     Problem: METABOLIC/FLUID AND ELECTROLYTES - ADULT  Goal: Glucose maintained within prescribed range  Description: INTERVENTIONS:  - Monitor Blood Glucose as ordered  - Assess for signs and symptoms of hyperglycemia and hypoglycemia  - Administer ordered medications to maintain glucose within target range  - Assess barriers to adequate nutritional intake and initiate nutrition consult as needed  - Instruct patient on self  management of diabetes  Outcome: Progressing  Goal: Electrolytes maintained within normal limits  Description: INTERVENTIONS:  - Monitor labs and rhythm and assess patient for signs and symptoms of electrolyte imbalances  - Administer electrolyte replacement as ordered  - Monitor response to electrolyte replacements, including rhythm and repeat lab results as appropriate  - Fluid restriction as ordered  - Instruct patient on fluid and nutrition restrictions as appropriate  Outcome: Progressing  Goal: Hemodynamic stability and optimal renal function maintained  Description: INTERVENTIONS:  - Monitor labs and assess for signs and symptoms of volume excess or deficit  - Monitor intake, output and patient weight  - Monitor urine specific gravity, serum osmolarity and serum sodium as indicated or ordered  - Monitor response to interventions for patient's volume status, including labs, urine output, blood pressure (other measures as available)  - Encourage oral intake as appropriate  - Instruct patient on fluid and nutrition restrictions as appropriate  Outcome: Progressing

## 2024-12-04 NOTE — ANESTHESIA PREPROCEDURE EVALUATION
Anesthesia PreOp Note    HPI:     Alisha Wilde is a 59 year old female who presents for preoperative consultation requested by: Myron Madrigal MD    Date of Surgery: 2024    Procedure(s):  Tracheostomy  Indication: Chronic respiratory failure, unspecified whether with hypoxia or hypercapnia ([facility]) [J96.10]    Relevant Problems   No relevant active problems       NPO: Tube feeds held > 8 hours                         History Review:  Patient Active Problem List    Diagnosis Date Noted    Acute respiratory failure with hypoxia ([facility]) 2024    NAIMA (acute kidney injury) ([facility]) 2024    Stage 3 chronic kidney disease ([facility]) 2024    Dissection of ascending aorta ([facility]) 2024    Positive for microalbuminuria 10/28/2024    Age-related nuclear cataract of both eyes 2024    Myalgia due to statin 2024    Hypokalemia 2023    Polyp of colon 2022    Flat feet, bilateral 2022    Neck mass 2021    Obesity (BMI 30-39.9) 10/01/2020    Controlled type 2 diabetes mellitus without complication, without long-term current use of insulin ([facility]) 2020    Adjustment disorder with mixed anxiety and depressed mood 2020    Vitamin D deficiency 2019    Alcoholism ([facility]) 2018    Essential hypertension 2018    Hypercholesteremia 2016       Past Medical History:    Chronic kidney disease (CKD)    Esophageal reflux    High blood pressure    High cholesterol    HYPERTENSION    Hypertension    Unspecified essential hypertension       Past Surgical History:   Procedure Laterality Date    Colonoscopy N/A 2018    Procedure: COLONOSCOPY;  Surgeon: Magdy Webber MD;  Location: [facility] ENDOSCOPY    Endometrial ablation      child passed away for 5 mins          1    Other surgical history  11    cysto-Dr. Lacey -- pt denies       Prescriptions Prior to Admission[1]  Current Medications and Prescriptions Ordered in  Epic[2]    Allergies[3]    Family History   Problem Relation Age of Onset    Hypertension Mother     Heart Disorder Mother 70    Other (Other) Mother         kidney failure    Hypertension Father     Hypertension Maternal Grandfather     Cancer Neg     Diabetes Neg     Glaucoma Neg     Macular degeneration Neg      Social History     Socioeconomic History    Marital status: Single   Tobacco Use    Smoking status: Former     Current packs/day: 0.00     Average packs/day: 1 pack/day for 13.0 years (13.0 ttl pk-yrs)     Types: Cigarettes     Start date:      Quit date:      Years since quittin.9    Smokeless tobacco: Former   Vaping Use    Vaping status: Never Used   Substance and Sexual Activity    Alcohol use: Yes     Alcohol/week: 1.0 - 2.0 standard drink of alcohol     Types: 1 - 2 Cans of beer per week     Comment: every day    Drug use: No       Available pre-op labs reviewed.  Lab Results   Component Value Date    WBC 11.5 (H) 2024    RBC 3.02 (L) 2024    HGB 8.0 (L) 2024    HCT 25.3 (L) 2024    MCV 83.8 2024    MCH 26.5 2024    MCHC 31.6 2024    RDW 16.5 (H) 2024    .0 2024     Lab Results   Component Value Date     2024     2024    K 4.5 2024    K 4.5 2024     (H) 2024     (H) 2024    CO2 19.0 (L) 2024    CO2 19.0 (L) 2024    BUN 33 (H) 2024    BUN 33 (H) 2024    CREATSERUM 1.60 (H) 2024    CREATSERUM 1.60 (H) 2024     (H) 2024     (H) 2024    PGLU 115 (H) 2024    CA 8.9 2024    CA 8.9 2024     Lab Results   Component Value Date    INR 1.25 (H) 2024       Vital Signs:  There is no height or weight on file to calculate BMI.   vitals were not taken for this visit.   There were no vitals filed for this visit.     Anesthesia Evaluation      Airway   Mallampati: II  TM distance: >3 FB  Neck ROM:  full  Comment: Intubated  Dental - Dentition appears grossly intact     Pulmonary - normal exam     ROS comment: Acute hypoxic Resp Failure - Intubated  Aspiration PNA    Vent Mode: VC+  FiO2 (%):  [30 %] 30 %  S RR:  [18] 18  S VT:  [550 mL] 550 mL  PEEP/CPAP (cm H2O):  [5 cm H20] 5 cm H20  MAP (cm H2O):  [8-13] 8    Cardiovascular - normal exam  Exercise tolerance: poor  (+) hypertension (Essential HTN on Coreg 6.25mg BID, norvasc 10mg daily, hydralazine 150mg TID. Clonidinie patch 0.2mg daily isosorbide 40mg TID.) poorly controlled    ECG reviewed  ROS comment: Type A aortic dissection   º S/p emergent repair 11/22/24   º Off NTG gtt now on labetalol gtt  º TTE LVEF 75-80%.   º Cont BP control.       Echo  Conclusions:     1. Left ventricle: The cavity size was normal. Wall thickness was mildly      increased. Systolic function was hyperdynamic. The estimated ejection      fraction was 75-80%, by visual assessment. No diagnostic evidence for      regional wall motion abnormalities. Left ventricular diastolic function      parameters were normal.   2. Right ventricle: Catheter noted in the right ventricle.   3. Ventricular septum: Thickness was moderately increased.   4. Right atrium: Catheter noted in right atrium.   5. Pulmonary arteries: Systolic pressure was within the normal range.       Neuro/Psych      GI/Hepatic/Renal    (+) GERD, chronic renal disease (NAIMA on CKD III)    Endo/Other    (+) diabetes mellitus poorly controlled    Comments: H/o tobacco and ETOH abuse   º Duonebs PRN    Morbid Obesity   TANYA    Abdominal  - normal exam                 Anesthesia Plan:   ASA:  4  Plan Comments: I have discussed the anesthetic plan, major risks and alternatives with the patient and answered all questions.  Minor and major side effects were discussed with patient, including but not limited to: injury to teeth/gums/lips, aspiration, nausea/vomiting postoperatively, anaphylaxis, heart attack, stroke, post-operative  ventilation and death. The patient desires to proceed with surgery and anesthesia as planned.          I have informed Alisha Wilde and/or legal guardian or family member of the nature of the anesthetic plan, benefits, risks including possible dental damage if relevant, major complications, and any alternative forms of anesthetic management.   All of the patient's questions were answered to the best of my ability. The patient desires the anesthetic management as planned.  Angel Atwood MD  12/3/2024 7:39 PM  Present on Admission:  **None**           [1]   Medications Prior to Admission   Medication Sig Dispense Refill Last Dose/Taking    Acetaminophen ER (TYLENOL 8 HOUR) 650 MG Oral Tab CR Take 2 tablets (1,300 mg total) by mouth daily as needed.       Calcium Carb-Cholecalciferol 600-10 MG-MCG Oral Tab Take 1 tablet by mouth daily.       metoprolol succinate ER 50 MG Oral Tablet 24 Hr Take 1 tablet (50 mg total) by mouth daily. 90 tablet 3     Losartan Potassium-HCTZ 100-12.5 MG Oral Tab Take 1 tablet by mouth daily. 90 tablet 3     Potassium Chloride ER 20 MEQ Oral Tab CR Take 1 tablet by mouth daily. 90 tablet 3     albuterol 108 (90 Base) MCG/ACT Inhalation Aero Soln Inhale 2 puffs into the lungs every 4 (four) hours as needed for Wheezing. 3 each 3     amLODIPine 10 MG Oral Tab Take 1 tablet (10 mg total) by mouth daily. 90 tablet 3     Ascorbic Acid (VITAMIN C) 1000 MG Oral Tab Take 1 tablet (1,000 mg total) by mouth daily.       vitamin B-12 50 MCG Oral Tab Take 1 tablet (50 mcg total) by mouth daily.       fluticasone propionate 50 MCG/ACT Nasal Suspension 2 sprays by Nasal route daily. 3 each 1     fluticasone-salmeterol 250-50 MCG/ACT Inhalation Aerosol Powder, Breath Activated Inhale 1 puff into the lungs 2 (two) times daily. inhale 1 puff by INHALATION route 2 times every day morning and evening approximately 12 hours apart (Patient not taking: Reported on 11/21/2024) 1 each 5    [2]   Current  Facility-Administered Medications Ordered in Epic   Medication Dose Route Frequency Provider Last Rate Last Admin    metoclopramide (Reglan) 5 mg/mL injection 5 mg  5 mg Intravenous q6h Mariela Noel APRN   5 mg at 12/03/24 1423    docusate (Colace) 50 MG/5ML oral solution 100 mg  100 mg Oral BID Mariela Noel APRN        sodium ferric gluconate (Ferrlecit) 125 mg in sodium chloride 0.9% 100mL IVPB premix  125 mg Intravenous Daily Juan Franco  mL/hr at 12/03/24 1152 125 mg at 12/03/24 1152    isosorbide dinitrate (Isordil) tab 40 mg  40 mg Per NG Tube TID (Nitrates) Edward Juárez, DO   40 mg at 12/03/24 1725    labetalol (Trandate) 400 mg in sodium chloride 0.9% 200 mL infusion  1 mg/min Intravenous Continuous Sikorcin, JORGE Vazquez 30 mL/hr at 12/03/24 0800 1 mg/min at 12/03/24 0800    [Held by provider] furosemide (Lasix) 10 mg/mL injection 40 mg  40 mg Intravenous Daily Juan Franco MD   40 mg at 12/03/24 0925    hydrALAZINE (Apresoline) tab 150 mg  150 mg Oral Q8H [facility] Uriel Mejía MD   150 mg at 12/03/24 1423    cloNIDine (Catapres) 0.2 MG/24HR patch 1 patch  1 patch Transdermal Weekly Uriel Mejía MD   1 patch at 12/01/24 1238    insulin regular human (Novolin R, Humulin R) 100 UNIT/ML injection 1-5 Units  1-5 Units Subcutaneous 4 times per day Mariela Noel APRN   1 Units at 12/01/24 1157    carvedilol (Coreg) tab 6.25 mg  6.25 mg Oral BID with meals Edward Juárez, DO   6.25 mg at 12/03/24 1725    amLODIPine (Norvasc) tab 10 mg  10 mg Oral Daily Edward Juárez, DO   10 mg at 12/03/24 0926    [Held by provider] losartan (Cozaar) tab 50 mg  50 mg Oral Daily Edward Juárez, DO   50 mg at 11/28/24 0915    dextrose 10% infusion (TPN no rate)   Intravenous Continuous PRN Katiuska Randle APRN        pancrelipase (Lip-Prot-Amyl) (Zenpep) DR particles cap 10,000 Units  10,000 Units Per G Tube PRN Katiuska Randle APRN        And    sodium bicarbonate tab 325 mg  325 mg Oral PRN  Katiuska Randle APRN        sodium chloride 3 % nebulizer solution 3 mL  3 mL Nebulization 4 times per day Dmitri Herman DO   3 mL at 12/03/24 1432    ipratropium-albuterol (Duoneb) 0.5-2.5 (3) MG/3ML inhalation solution 3 mL  3 mL Nebulization Q6H PRN Katiuska Randle APRN   3 mL at 11/30/24 1603    albuterol (Ventolin) (2.5 MG/3ML) 0.083% nebulizer solution 2.5 mg  2.5 mg Nebulization 4 times per day Rafa Gregg MD   2.5 mg at 12/03/24 1432    HYDROcodone-acetaminophen (Norco) 5-325 MG per tab 2 tablet  2 tablet Oral Q4H PRN Katiuska Randle APRN   2 tablet at 12/03/24 1423    heparin (Porcine) 5000 UNIT/ML injection 5,000 Units  5,000 Units Subcutaneous Q8H GABRIEL Katiuska Randle APRN   5,000 Units at 12/03/24 1430    melatonin tab 3 mg  3 mg Oral Nightly PRN Neil Leone MD        polyethylene glycol (PEG 3350) (Miralax) 17 g oral packet 17 g  17 g Oral Daily PRN Neil Leone MD        bisacodyl (Dulcolax) 10 MG rectal suppository 10 mg  10 mg Rectal Daily PRN Neil Leone MD        ondansetron (Zofran) 4 MG/2ML injection 4 mg  4 mg Intravenous Q6H PRN Neil Leone MD   4 mg at 11/25/24 1105    dexmedeTOMIDine in sodium chloride 0.9% (Precedex) 400 mcg/100mL infusion premix  0.2-1.5 mcg/kg/hr (Dosing Weight) Intravenous Continuous Neil Leone MD 29.3 mL/hr at 12/03/24 1800 1 mcg/kg/hr at 12/03/24 1800    norepinephrine (Levophed) 4 mg/250mL infusion premix  0.5-30 mcg/min Intravenous Continuous PRN Neil Leone MD   Paused at 11/23/24 2225    potassium chloride 20 mEq/100mL IVPB premix 20 mEq  20 mEq Intravenous PRN Neil Leone  mL/hr at 11/27/24 1947 20 mEq at 11/27/24 1947    Or    potassium chloride 40 mEq/100mL IVPB premix (central line) 40 mEq  40 mEq Intravenous PRN Neil Leone MD 50 mL/hr at 11/26/24 2014 40 mEq at 11/26/24 2014    magnesium sulfate in dextrose 5% 1 g/100mL infusion premix 1 g  1 g Intravenous PRN Neil Leone MD        magnesium sulfate in  sterile water for injection 2 g/50mL IVPB premix 2 g  2 g Intravenous PRN Neil Leone MD        chlorhexidine gluconate (Peridex) 0.12 % oral solution 15 mL  15 mL Mouth/Throat BID Neil Leone MD   15 mL at 12/03/24 0926    morphINE PF 2 MG/ML injection 2 mg  2 mg Intravenous Q2H PRN Neil Leone MD   2 mg at 12/02/24 2214    propofol (Diprivan) 10 mg/mL infusion premix  5-50 mcg/kg/min (Dosing Weight) Intravenous Continuous Neil Leone MD 31.6 mL/hr at 12/03/24 1800 45 mcg/kg/min at 12/03/24 1800    fentaNYL (Sublimaze) 50 mcg/mL injection 25 mcg  25 mcg Intravenous Q3H PRN Kory Rainey DO   25 mcg at 12/02/24 1540    famotidine (Pepcid) tab 20 mg  20 mg Oral Daily Katiuska Randle APRN   20 mg at 12/03/24 0926    Or    famotidine (Pepcid) 20 mg/2mL injection 20 mg  20 mg Intravenous Daily Katiuska Randle APRN   20 mg at 12/01/24 0832    aspirin chewable tab 81 mg  81 mg Oral Daily Katiuska Randle APRN   81 mg at 12/03/24 0926     No current [facility]-ordered outpatient medications on file.   [3]   Allergies  Allergen Reactions    Atorvastatin MYALGIA    Pravastatin MYALGIA    Rosuvastatin MYALGIA    Seasonal Runny nose

## 2024-12-04 NOTE — RESPIRATORY THERAPY NOTE
Patient received intubated and on ventilator 18/550/+5/30%.     Pt had surgery to place trach today. Portex 8, cuffed.    Bilateral breath sounds auscultated. Suction provided when indicated.   Pt had a bradycardic episode immediately after retuning from trach placement. Neb was given. Pt recovered.    RT will continue to monitor.

## 2024-12-04 NOTE — PLAN OF CARE
Problem: Safety Risk - Non-Violent Restraints  Goal: Patient will remain free from self-harm  Description: INTERVENTIONS:  - Apply the least restrictive restraint to prevent harm  - Notify patient and family of reasons restraints applied  - Assess for any contributing factors to confusion (electrolyte disturbances, delirium, medications)  - Discontinue any unnecessary medical devices as soon as possible  - Assess the patient's physical comfort, circulation, skin condition, hydration, nutrition and elimination needs   - Reorient and redirection as needed  - Assess for the need to continue restraints  Outcome: Progressing

## 2024-12-04 NOTE — PROGRESS NOTES
Progress Note  Alisha Wilde Patient Status:  Inpatient    10/25/1965 MRN [id number]   Location [facility] 2W/SW Attending Missy Paulson MD   Hosp Day # 12 PCP Bridger Avendano MD     Subjective:  On vent   S/p trach today      S/p tachy placement today, pt was bradycardiac and had received atropine x1   Objective:  /58 (BP Location: Left arm)   Pulse 77   Temp 98.1 °F (36.7 °C) (Axillary)   Resp 24   Ht 5' 5\" (1.651 m)   Wt 291 lb 0.1 oz (132 kg)   SpO2 100%   BMI 48.43 kg/m²     Telemetry: NSR, PVC      Intake/Output:    Intake/Output Summary (Last 24 hours) at 2024 1431  Last data filed at 2024 1200  Gross per 24 hour   Intake 3542.45 ml   Output 2225 ml   Net 1317.45 ml       Last 3 Weights   24 0322 291 lb 0.1 oz (132 kg)   24 0300 285 lb 7.9 oz (129.5 kg)   24 0600 288 lb 5.8 oz (130.8 kg)   24 0300 268 lb 8.3 oz (121.8 kg)   24 0600 266 lb 5.1 oz (120.8 kg)   24 0600 284 lb 9.8 oz (129.1 kg)   24 0621 282 lb 3 oz (128 kg)   24 0800 275 lb 9.2 oz (125 kg)   24 0419 258 lb 2.5 oz (117.1 kg)   24 0200 258 lb 2.5 oz (117.1 kg)   10/24/24 1519 254 lb (115.2 kg)   24 1522 249 lb (112.9 kg)       Labs:  Recent Labs   Lab 24  0434 24  0636 24  0312 24  0510   *  --  130*  130* 128*   BUN  --  29* 33*  33* 42*   CREATSERUM 1.46*  --  1.60*  1.60* 1.65*   EGFRCR 41*  --  36*  36* 35*   CA 9.1  --  8.9  8.9 9.0     --  140  140 139   K  --  4.6 4.5  4.5 4.4     --  113*  113* 111   CO2 20.0*  --  19.0*  19.0* 17.0*     Recent Labs   Lab 24  0432 24  0312 24  0510   RBC 3.09* 3.02* 2.89*   HGB 8.3* 8.0* 7.7*   HCT 25.1* 25.3* 24.2*   MCV 81.2 83.8 83.7   MCH 26.9 26.5 26.6   MCHC 33.1 31.6 31.8   RDW 16.3* 16.5* 16.6*   NEPRELIM 7.08 7.99* 8.52*   WBC 10.4 11.5* 11.9*   .0 194.0 238.0         No results for input(s): \"TROP\", \"TROPHS\", \"CK\" in the  last 168 hours.  Lab Results   Component Value Date    INR 1.17 12/04/2024    INR 1.25 (H) 11/22/2024    INR 0.99 11/21/2024       Diagnostics:     Echo from 11/23/24  Conclusions:     1. Left ventricle: The cavity size was normal. Wall thickness was mildly      increased. Systolic function was hyperdynamic. The estimated ejection      fraction was 75-80%, by visual assessment. No diagnostic evidence for      regional wall motion abnormalities. Left ventricular diastolic function      parameters were normal.   2. Right ventricle: Catheter noted in the right ventricle.   3. Ventricular septum: Thickness was moderately increased.   4. Right atrium: Catheter noted in right atrium.   5. Pulmonary arteries: Systolic pressure was within the normal range.   *     Physical Exam:    Physical Exam  Vitals reviewed.   Constitutional:       Comments: sedated   Neck:      Comments: trach  Cardiovascular:      Rate and Rhythm: Normal rate and regular rhythm.      Heart sounds: No murmur heard.     No friction rub. No gallop.   Pulmonary:      Effort: Pulmonary effort is normal. No respiratory distress.      Breath sounds: Normal breath sounds. No stridor. No wheezing or rhonchi.   Chest:      Chest wall: No tenderness.   Abdominal:      General: There is distension.      Comments: Rounded, hard    Musculoskeletal:      Right lower leg: No edema.      Left lower leg: No edema.         Medications:   [START ON 12/5/2024] aspirin  300 mg Rectal Daily    metoclopramide  5 mg Intravenous q6h    [Held by provider] docusate  100 mg Oral BID    sodium ferric gluconate  125 mg Intravenous Daily    [Held by provider] isosorbide dinitrate  40 mg Per NG Tube TID (Nitrates)    [Held by provider] furosemide  40 mg Intravenous Daily    [Held by provider] hydrALAZINE  150 mg Oral Q8H GABRIEL    cloNIDine  1 patch Transdermal Weekly    [Held by provider] carvedilol  6.25 mg Oral BID with meals    [Held by provider] amLODIPine  10 mg Oral Daily     sodium chloride  3 mL Nebulization 4 times per day    albuterol  2.5 mg Nebulization 4 times per day    heparin  5,000 Units Subcutaneous Q8H GABRIEL    chlorhexidine gluconate  15 mL Mouth/Throat BID    famotidine  20 mg Intravenous Daily      labetalol (Trandate) 400 mg in sodium chloride 0.9% 200 mL infusion 2 mg/min (12/04/24 1326)    dexmedetomidine 0.8 mcg/kg/hr (12/04/24 1402)    norepinephrine Stopped (11/23/24 2225)    propofol 20 mcg/kg/min (12/04/24 1340)       Assessment/Plan:  Pt presented with acute onset CP    Type A aortic dissection  - Ct with type A aortic dissection above riht coronary artery and extending distally into left common iliac artery and external iliac   - s/p emergent repair on 11/22/24  - echo from 11/23 with hyperdynamic LVEF    Acute hypoxic respiratory failure   - pt had aspiration event following self extubation, followed by reintubation  - s/p Tracheotomy placement today    HTN  - on labetalol gtt  - clonidine patch   - lasix and hydralzine, isosorbide, coreg and amlodipine are on hold since pt is NPO for g tube  placement tomorrow    Morbid obesity    NAIMA on CKD  - creatinine trending up  - nephrology following     ETOH abuse       Plan;  - continue monitor on tele  - gradually restart on antihypertensive s/p GT placement and wean off labetalol gtt   - volume mgt per nephrology    ResKATHERINE Carvalho  [facility]  12/4/2024  2:31 PM

## 2024-12-04 NOTE — PROGRESS NOTES
[facility]  part of [facility]    Progress Note    Alisha Wilde Patient Status:  Inpatient    10/25/1965 MRN [id number]   Location [facility] 2W/SW Attending Hayde Brito MD   Hosp Day # 12 PCP Bridger Avendano MD     Subjective:  Just returned from OR/s/p trach placement, HR 30 - s/p atropine with improvement rate 70s. Awake, grimacing in pain. Blood tinged tenacious secretions from trach.  No visitors at bedside.     Objective:  /60 (BP Location: Left arm)   Pulse 76   Temp 97.6 °F (36.4 °C) (Axillary)   Resp 18   Ht 5' 5\" (1.651 m)   Wt 291 lb 0.1 oz (132 kg)   SpO2 98%   BMI 48.43 kg/m²     Temp (24hrs), Av °F (36.7 °C), Min:97.6 °F (36.4 °C), Max:98.4 °F (36.9 °C)      Intake/Output:    Intake/Output Summary (Last 24 hours) at 2024 0923  Last data filed at 2024 0739  Gross per 24 hour   Intake 3642.45 ml   Output 1875 ml   Net 1767.45 ml       Wt Readings from Last 3 Encounters:   24 291 lb 0.1 oz (132 kg)   10/24/24 254 lb (115.2 kg)   24 249 lb (112.9 kg)       Allergies:  Allergies[1]    Labs:  Lab Results   Component Value Date    WBC 11.9 2024    HGB 7.7 2024    HCT 24.2 2024    .0 2024    CREATSERUM 1.65 2024    BUN 42 2024     2024    K 4.4 2024     2024    CO2 17.0 2024     2024    CA 9.0 2024    ALB 3.5 2024    PTT 30.7 2024    INR 1.17 2024    MG 2.5 2024    PHOS 5.3 2024       Physical Exam:  Blood pressure 131/60, pulse 76, temperature 97.6 °F (36.4 °C), temperature source Axillary, resp. rate 18, height 5' 5\" (1.651 m), weight 291 lb 0.1 oz (132 kg), SpO2 98%, not currently breastfeeding.  General: NAD  Neck: +trach/blood tinged secretions/neck  Lungs: scattered rhonchi bilat  Heart: RRR, S1, S2  Abdomen: Soft/Obese, NT/ND, BS+x 4/-BM  Extremities: Warm, dry, 1-2+generalized edema bilat  Pulses: 2+  bilat DP  Skin: sternotomy incision CATHIE=CDI   Neurological: awake n proprofol - grimaces to pain, moves all extremities spontaneously    Assessment/Plan:  S/P EMERGENT AORTIC DISSECTION REPAIR POD # 12  Respiratory Failure-currently on full vent support. Failed multiple daily SBT trials, s/p trach placement this am w/ENT.   Bradycardia post trach placement - atropine x 1, hold precedex until HR stabilizes, propofol for sedation  Re-intubated on 11/22 per Pulm after emesis episode/ETT removal due to emesis content noted in airway. Completed 10 day course Zosyn 12/1 for suspected Aspiration PNA.  Cx 11/28 - [place] - d/w Dr. Rainey - likely contaminant - no need to treat at present.  CT Chest/Abdomen/Pelvis (non-contrast) 11/26=no obstruction noted/stable. +BM -none today.  OGT removed in OR, plan for PEG tomorrow.    HTN: on multiple antihypertensives including IV Labetolol-SBP goal= <130/MAP >70. Mgt per Cards. New A-line placed 12/2  New PICC placed 12/2  Pain meds as needed  Scds/Heparin SubQ prophylaxis DVT prevention. HIPA 11/25=negative. Plt 238  Continue ICU status  Expected post op volume overload: mgt per Nephrology. Hx: NAIMA on CKD-limit/avoid nephrotoxic meds. On Lasix daily -held today with OR/Crt rising, NPO., net+ 15L since surgery.Webb remains for close I/O monitoring & immobility.  Expected post op anemia: expected slight bleeding from trach - Hgb 7.7 preop - transfuse 1 PRBC, also May be dilutional due to hypervolemia  No hx: DM -monitor accuchecks while NPO/off TF, no corrective scale needed - d/c'd.  Hx: Morbid obesity w/BMI >40-plan for RD to see when appropriate  Nutrition -TF on hold with OGT out.  With new trach, will not place OGT/DHT today GI consulted for PEG -likely placement 12/5  Hx: ETOH abuse-monitor for withdrawals. CIWA protocol PRN     Discharge planning: pt lives alone and has supportive family.  Trach placement today, PEG placement tomorrow.  Once medically stable, anticipate LTAC at  discharge: referrals sent proactively by  on 11/29 for LTAC.     Plan of care discussed w/RN, and rounding CV surgeon by phone Dr. Marysol IBARRA  12/4/24  0800         [1]   Allergies  Allergen Reactions    Atorvastatin MYALGIA    Pravastatin MYALGIA    Rosuvastatin MYALGIA    Seasonal Runny nose

## 2024-12-04 NOTE — PLAN OF CARE
Problem: RESPIRATORY - ADULT  Goal: Achieves optimal ventilation and oxygenation  Description: INTERVENTIONS:  - Assess for changes in respiratory status  - Assess for changes in mentation and behavior  - Position to facilitate oxygenation and minimize respiratory effort  - Oxygen supplementation based on oxygen saturation or ABGs  - Provide Smoking Cessation handout, if applicable  - Encourage broncho-pulmonary hygiene including cough, deep breathe, Incentive Spirometry  - Assess the need for suctioning and perform as needed  - Assess and instruct to report SOB or any respiratory difficulty  - Respiratory Therapy support as indicated  - Manage/alleviate anxiety  - Monitor for signs/symptoms of CO2 retention  Outcome: Progressing     Pt received on MV AC/VC+ 18/550/+5/30%.   ET tube 7.5 secured ar 25 cm at the upper lip.   Moderate thick tan/pink tinged secretion suctioned from ET tube.  Neb treatment done as scheduled.   RT continue to monitor.

## 2024-12-04 NOTE — CM/SW NOTE
List of accepting LTAC facilities emailed to pt's brother Osbaldo at [email or web address] per his request. Osbaldo agreeable to review list and provide choice in next couple of days. Trach placed today, PEG scheduled for tomorrow. CM will send updates once both procedures have been completed. Ins auth will be needed for LTAC placement prior to dc.    12/5/2024 at 1215: PEG placed today, updates uploaded. Pt's brother unavailable for dc discussion until 7pm this evening.    12/6/2024 at 1535: Pt's brother still reviewing list of accepting LTAC facilities. Pt is not medically ready for dc. Pt currently on nicardipine, precedex, and propofol drips. Ins auth will be needed for LTAC prior to dc.    12/9/2024 at 1355: Pt's brother Osbaldo still reviewing list of accepting LTAC facilities. Osbaldo requesting another day to review facilities w/his sister. Tuesday CM will need to follow up for choice. Pt currently on sedation with Nicardipine and Lasix drips running.     Plan: LTAC pending facility choice, ins auth, and medical clearance.    Frank Tai, TRISTANN    [phone]

## 2024-12-04 NOTE — ANESTHESIA POSTPROCEDURE EVALUATION
Patient: Alisha Wilde    Procedure Summary       Date: 12/04/24 Room / Location: [facility] MAIN OR 02 / [facility] MAIN OR    Anesthesia Start: 0725 Anesthesia Stop: 0849    Procedure: Tracheostomy (Neck) Diagnosis:       Chronic respiratory failure, unspecified whether with hypoxia or hypercapnia (HCC)      (Chronic respiratory failure, unspecified whether with hypoxia or hypercapnia (HCC) [J96.10])    Surgeons: Myron Madrigal MD Anesthesiologist: Angel Atwood MD    Anesthesia Type: general ASA Status: 4            Anesthesia Type: general    Vitals Value Taken Time   /50 12/04/24 0849   Temp 97.5 12/04/24 0849   Pulse 50 12/04/24 0849   Resp 16 12/04/24 0849   SpO2 99 12/04/24 0849       [facility] AN Post Evaluation:   Patient Evaluated in ICU  Patient Participation: complete - patient cannot participate  Level of Consciousness: Post-procedure mental status: intubated.  Airway Patency:patent (Trach)  Dental exam unchanged from preop  Yes    Nausea/Vomiting: none  Cardiovascular Status: bradycardic  Respiratory Status: ETT  Postoperative Hydration acceptable  Comments: Patient bradycardic upon ICU handoff. Atropine given by ICU RN.      Angel Atwood MD  12/4/2024 8:49 AM

## 2024-12-04 NOTE — OPERATIVE REPORT
Operative Report    MRN: [id number]     Patient Name: Alisha Wilde     YOB: 1965     PROCEDURE DATE: 12/04/24     PREOPERATIVE DIAGNOSIS: respiratory failure, prolonged intubation     POSTOPERATIVE DIAGNOSIS: respiratory failure, prolonged intubation     PROCEDURE: tracheostomy     SURGEON: Myron Cai MD     ASSISTANT SURGEON: Tung Casanova MD     ANESTHESIA: General endotracheal     EBL: 25 mL.     SPECIMEN: none     OPERATIVE FINDINGS: normal airway anatomy, thyroid goiter divided, tracheostomy placed between the 1st and 2nd tracheal rings, stay suture placed; 8 portex cuffed tracheostomy tube placed     COUNTS: Correct.     COMPLICATIONS: None.     DISPOSITION: ICU     INDICATIONS FOR PROCEDURE: This patient is a 59 year old female who was seen for inpatient consultation due to respiratory failure and need for continued ventilatory support.  The patient was originally admitted after emergent repair of aortic dissection.  It was recommended that the patient undergo tracheostomy. Risk, benefits, and alternatives of the procedure were discussed with the patient's brother including the risks of bleeding, infection, scarring, airway compromise, damage to the aerodigestive tract, anesthetic risk.  Patient's brother freely consented to the procedure.     DESCRIPTION OF PROCEDURE: Patient was taken back to the operating room and placed supine on the operating room table.  Verification was performed with the anesthesia service. Patient arrived to the operating room already intubated. The patient was then turned over to the ENT service for the duration of the procedure.  The patient was positioned, prepped, and draped in the usual sterile fashion for neck surgery.  A final timeout was performed.      The patient's airway landmarks were palpated and marked.  A horizontal incision in the midline was also marked and injected with 1% lidocaine with 1-100,000 epinephrine.  Skin incision down into the  subcutaneous tissue was made.  Subcutaneous fat was removed. The strap muscles were identified and divided in the midline.  A thryoid goiter was noted and was divided in the midline. Veins were divided and tied. On palpation, the cricoid was felt.  We continued dissection until the cricoid was exposed.  A cricoid hook was placed.  Soft tissue and thyroid overlying the trachea was divided in the midline.  The tracheal rings were exposed.  Inferiorly the innominate was not palpable.  FiO2 was noted to be at 28%.  The tracheostomy cuff was checked and noted to be functioning normally.  An incision was made between the first and second tracheal rings.  The endotracheal tube was visualized.  A 2-0 silk stay suture was placed at the inferior aspect of the tracheal incision. The anesthesiologist deflated the cuff of the endotracheal tube and pulled the endotracheal tube back so that the tip was just superior to our incision.  Using heavy Alatorre scissors the incision was extended bilaterally.  A tracheal dilator with used to further open the tracheostomy site.  With obturator in place a Portex 8 cuffed tracheostomy tube was placed through the tracheostomy site into the trachea under direct visualization.  The cuff was inflated.  A flexible suction was passed without resistance. The circuit was connected to the tracheostomy tube and end-tidal CO2 and adequate respiratory volumes were noted.  The retractors were removed.  Surgicel was placed in the tracheostomy site. The faceplate of the tracheostomy was sutured in place with four 2-0 silk sutures.  A Jose Roberto soft collar was placed.  This concluded the procedure.  The patient was turned back over to the anesthesia service and was transported to the ICU in stable condition.

## 2024-12-04 NOTE — PROGRESS NOTES
Restraints in place for patient safety.  Patient had SBT today, observed reaching for high risk lines and tubes.

## 2024-12-04 NOTE — PLAN OF CARE
Ms. Brito was kept safe overnight with the bilateral soft wrist restraints and frequent rounding. In preparation for her tracheostomy procedure this a.m., she was washed with the 4% CHG soap and her tube feedings were held since before midnight.  Two RN's talked with her brother, Osbaldo, for consent for the procedure and the anesthesia. He denied questions about the procedure at that time as he had already talked with the ENT.    Problem: Patient Centered Care  Goal: Patient preferences are identified and integrated in the patient's plan of care  Description: Interventions:  - What would you like us to know as we care for you? I work at [employer] and I am still very good friends with people from grade school! My brother, Osbaldo Prinec, has been making decisions for me.  - Provide timely, complete, and accurate information to patient/family  - Incorporate patient and family knowledge, values, beliefs, and cultural backgrounds into the planning and delivery of care  - Encourage patient/family to participate in care and decision-making at the level they choose  - Honor patient and family perspectives and choices  Outcome: Progressing     Problem: Diabetes/Glucose Control  Goal: Glucose maintained within prescribed range  Description: INTERVENTIONS:  - Monitor Blood Glucose as ordered  - Assess for signs and symptoms of hyperglycemia and hypoglycemia  - Administer ordered medications to maintain glucose within target range  - Assess barriers to adequate nutritional intake and initiate nutrition consult as needed  - Instruct patient on self management of diabetes  Outcome: Progressing     Problem: Patient/Family Goals  Goal: Patient/Family Long Term Goal  Description: Patient's Long Term Goal: To discharge from the hospital safely    Interventions:  - surgical interventions, rounding, medications  - See additional Care Plan goals for specific interventions  Outcome: Progressing  Goal: Patient/Family Short Term  Goal  Description: Patient's Short Term Goal: to breathe better    Interventions:   - manage the ventilator for pt until she is able to breath on her own.  - See additional Care Plan goals for specific interventions  Outcome: Not Progressing     Problem: CARDIOVASCULAR - ADULT  Goal: Maintains optimal cardiac output and hemodynamic stability  Description: INTERVENTIONS:  - Monitor vital signs, rhythm, and trends  - Monitor for bleeding, hypotension and signs of decreased cardiac output  - Evaluate effectiveness of vasoactive medications to optimize hemodynamic stability  - Monitor arterial and/or venous puncture sites for bleeding and/or hematoma  - Assess quality of pulses, skin color and temperature  - Assess for signs of decreased coronary artery perfusion - ex. Angina  - Evaluate fluid balance, assess for edema, trend weights  Outcome: Progressing  Goal: Absence of cardiac arrhythmias or at baseline  Description: INTERVENTIONS:  - Continuous cardiac monitoring, monitor vital signs, obtain 12 lead EKG if indicated  - Evaluate effectiveness of antiarrhythmic and heart rate control medications as ordered  - Initiate emergency measures for life threatening arrhythmias  - Monitor electrolytes and administer replacement therapy as ordered  Outcome: Progressing     Problem: RESPIRATORY - ADULT  Goal: Achieves optimal ventilation and oxygenation  Description: INTERVENTIONS:  - Assess for changes in respiratory status  - Assess for changes in mentation and behavior  - Position to facilitate oxygenation and minimize respiratory effort  - Oxygen supplementation based on oxygen saturation or ABGs  - Provide Smoking Cessation handout, if applicable  - Encourage broncho-pulmonary hygiene including cough, deep breathe, Incentive Spirometry  - Assess the need for suctioning and perform as needed  - Assess and instruct to report SOB or any respiratory difficulty  - Respiratory Therapy support as indicated  - Manage/alleviate  anxiety  - Monitor for signs/symptoms of CO2 retention  Outcome: Not Progressing     Problem: GASTROINTESTINAL - ADULT  Goal: Minimal or absence of nausea and vomiting  Description: INTERVENTIONS:  - Maintain adequate hydration with IV or PO as ordered and tolerated  - Nasogastric tube to low intermittent suction as ordered  - Evaluate effectiveness of ordered antiemetic medications  - Provide nonpharmacologic comfort measures as appropriate  - Advance diet as tolerated, if ordered  - Obtain nutritional consult as needed  - Evaluate fluid balance  Outcome: Progressing  Goal: Maintains or returns to baseline bowel function  Description: INTERVENTIONS:  - Assess bowel function  - Maintain adequate hydration with IV or PO as ordered and tolerated  - Evaluate effectiveness of GI medications  - Encourage mobilization and activity  - Obtain nutritional consult as needed  - Establish a toileting routine/schedule  - Consider collaborating with pharmacy to review patient's medication profile  Outcome: Not Progressing     Problem: METABOLIC/FLUID AND ELECTROLYTES - ADULT  Goal: Glucose maintained within prescribed range  Description: INTERVENTIONS:  - Monitor Blood Glucose as ordered  - Assess for signs and symptoms of hyperglycemia and hypoglycemia  - Administer ordered medications to maintain glucose within target range  - Assess barriers to adequate nutritional intake and initiate nutrition consult as needed  - Instruct patient on self management of diabetes  Outcome: Progressing     Problem: SKIN/TISSUE INTEGRITY - ADULT  Goal: Skin integrity remains intact  Description: INTERVENTIONS  - Assess and document risk factors for pressure ulcer development  - Assess and document skin integrity  - Monitor for areas of redness and/or skin breakdown  - Initiate interventions, skin care algorithm/standards of care as needed  Outcome: Progressing  Goal: Incision(s), wounds(s) or drain site(s) healing without S/S of  infection  Description: INTERVENTIONS:  - Assess and document risk factors for pressure ulcer development  - Assess and document skin integrity  - Assess and document dressing/incision, wound bed, drain sites and surrounding tissue  - Implement wound care per orders  - Initiate isolation precautions as appropriate  - Initiate Pressure Ulcer prevention bundle as indicated  Outcome: Progressing  Goal: Oral mucous membranes remain intact  Description: INTERVENTIONS  - Assess oral mucosa and hygiene practices  - Implement preventative oral hygiene regimen  - Implement oral medicated treatments as ordered  Outcome: Progressing     Problem: HEMATOLOGIC - ADULT  Goal: Free from bleeding injury  Description: (Example usage: patient with low platelets)  INTERVENTIONS:  - Avoid intramuscular injections, enemas and rectal medication administration  - Ensure safe mobilization of patient  - Hold pressure on venipuncture sites to achieve adequate hemostasis  - Assess for signs and symptoms of internal bleeding  - Monitor lab trends  - Patient is to report abnormal signs of bleeding to staff  - Avoid use of toothpicks and dental floss  - Use electric shaver for shaving  - Use soft bristle tooth brush  - Limit straining and forceful nose blowing  Outcome: Adequate for Discharge     Problem: MUSCULOSKELETAL - ADULT  Goal: Return mobility to safest level of function  Description: INTERVENTIONS:  - Assess patient stability and activity tolerance for standing, transferring and ambulating w/ or w/o assistive devices  - Assist with transfers and ambulation using safe patient handling equipment as needed  - Ensure adequate protection for wounds/incisions during mobilization  - Obtain PT/OT consults as needed  - Advance activity as appropriate  - Communicate ordered activity level and limitations with patient/family  Outcome: Not Progressing     Problem: NEUROLOGICAL - ADULT  Goal: Achieves stable or improved neurological  status  Description: INTERVENTIONS  - Assess for and report changes in neurological status  - Initiate measures to prevent increased intracranial pressure  - Maintain blood pressure and fluid volume within ordered parameters to optimize cerebral perfusion and minimize risk of hemorrhage  - Monitor temperature, glucose, and sodium. Initiate appropriate interventions as ordered  Outcome: Not Progressing     Problem: Safety Risk - Non-Violent Restraints  Goal: Patient will remain free from self-harm  Description: INTERVENTIONS:  - Apply the least restrictive restraint to prevent harm  - Notify patient and family of reasons restraints applied  - Assess for any contributing factors to confusion (electrolyte disturbances, delirium, medications)  - Discontinue any unnecessary medical devices as soon as possible  - Assess the patient's physical comfort, circulation, skin condition, hydration, nutrition and elimination needs   - Reorient and redirection as needed  - Assess for the need to continue restraints  Outcome: Not Progressing

## 2024-12-04 NOTE — PROGRESS NOTES
Pulmonary/ICU/Critical Care Progress Note        Reason for Consultation: post op care  Referring Physician: Dr. Gregg    Seen this am.     SUBJECTIVE:  Afebrile  Went for trach this am  Post trach became yoon and needed atropine x 1. Was still paralzyed at the time      ALLERGIES:  Allergies[1]        MEDS:  Home Medications:  Medications Taking[2]    Scheduled Medication:   [START ON 12/5/2024] aspirin  300 mg Rectal Daily    metoclopramide  5 mg Intravenous q6h    [Held by provider] docusate  100 mg Oral BID    sodium ferric gluconate  125 mg Intravenous Daily    [Held by provider] isosorbide dinitrate  40 mg Per NG Tube TID (Nitrates)    [Held by provider] furosemide  40 mg Intravenous Daily    [Held by provider] hydrALAZINE  150 mg Oral Q8H GABRIEL    cloNIDine  1 patch Transdermal Weekly    [Held by provider] carvedilol  6.25 mg Oral BID with meals    [Held by provider] amLODIPine  10 mg Oral Daily    sodium chloride  3 mL Nebulization 4 times per day    albuterol  2.5 mg Nebulization 4 times per day    heparin  5,000 Units Subcutaneous Q8H GABRIEL    chlorhexidine gluconate  15 mL Mouth/Throat BID    famotidine  20 mg Intravenous Daily     Continuous Infusing Medication:   labetalol (Trandate) 400 mg in sodium chloride 0.9% 200 mL infusion Stopped (12/04/24 0719)    dexmedetomidine 0.5 mcg/kg/hr (12/04/24 1142)    norepinephrine Stopped (11/23/24 2225)    propofol 40 mcg/kg/min (12/04/24 1141)     PRN Medications:    hydrALAzine    ipratropium-albuterol    [Held by provider] HYDROcodone-acetaminophen    melatonin    bisacodyl    ondansetron    norepinephrine    potassium chloride **OR** potassium chloride    magnesium sulfate in dextrose 5%    magnesium sulfate in sterile water for injection    morphINE **OR** [DISCONTINUED] morphINE    fentaNYL       PHYSICAL EXAM:  /66   Pulse 79   Temp 97.6 °F (36.4 °C) (Axillary)   Resp (!) 0   Ht 5' 5\" (1.651 m)   Wt 291 lb 0.1 oz (132 kg)   SpO2 100%   BMI 48.43  kg/m²   Vent Mode: VC+  FiO2 (%):  [30 %] 30 %  S RR:  [18] 18  S VT:  [550 mL] 550 mL  PEEP/CPAP (cm H2O):  [5 cm H20] 5 cm H20  MAP (cm H2O):  [12-13] 12    CONSTITUTIONAL: intubated, sedated  HEENT: atraumatic normocephalic  MOUTH: MMM, large tongue  NECK/THROAT: no JVD. Trachea midline. No obvious thyromegaly. + trach with min bleeding   LUNG: vented upper clear BS b/l no wheezing, + crackles. Diminished at bases. Chest symmetric with respiratory motion. + midsternal surgical wound healing   HEART: regular rate and rhythm, no obvious murmers or gallops noted  ABD: soft non tender. + bowel sounds. No organomegaly noted  EXT: no clubbing, cyanosis, or edema noted. Pulses intact grossly  NEURO/MUSCULOSKELETAL: intubated sedated   SKIN: warm, dry. No obvious lesions noted. Has keloid under right breast   LYMPH: no obvious LAD      IMAGES:   CXR 12/4/24  CONCLUSION:   1. Mild cardiomegaly and minimally prominent pulmonary vascularity but no huang pulmonary edema.  ET tube and NG tubes have been removed.  Tracheostomy is now noted in the trachea extending to the level the clavicles.  No pneumothorax.   2. Persistent patchy bilateral upper lobe airspace disease right more than left suggesting persistent bilateral upper lobe pneumonia.  Correlate clinically and continued follow-up is advised.     CXR 12/2/24  FINDINGS:   POSITION: The patient is semi-erect and slightly rotated to the right.   DEVICES: There is an endotracheal tube terminating approximately 3.9 cm above the jennyfer.  A large-bore right internal jugular central venous vascular access sheath has tip projecting in the SVC. An enteric tube extends caudally beyond the field of view.   CARDIAC/VASC: The cardiomediastinal silhouette is accentuated by AP technique, but likely stably enlarged. There is mild tortuosity of the thoracic aorta with peripheral atherosclerotic vascular calcification. The pulmonary vascularity is within normal   limits.   MEDIAST/HAMLET:  Surgical clips are present.   LUNGS/PLEURA: Elevation right hemidiaphragm is noted. Multifocal patchy airspace consolidation is demonstrated, slightly worse on the right side than left. Mild interstitial opacities are seen. Additional scattered reticular opacities may be atelectatic   in nature. No large pleural effusion or pneumothorax is evident.    BONES: Median sternotomy wires are demonstrated. Mild degenerative changes of the thoracic spine are apparent. There is no fracture or visible bony lesion.   OTHER: There may be surgical clips at the level of the thoracic inlet. Leads overlie the chest and obscure underlying detail     CXR 11/27/24  Moderate pulmonary edema, progressed since 11/26/2024.     CT c/a/p  CONCLUSION:  Low-lying ETT, 0.8 cm above the jennyfer   Multifocal bilateral pulmonary nodular consolidation s    CXR 11/24/24  FINDINGS:   CARDIAC/VASC: The cardiac silhouette is exaggerated by AP portable technique. There is stable central pulmonary venous congestion.   MEDIAST/HAMLET:   No visible mass or adenopathy.   LUNGS/PLEURA: There are stable streaky opacities at the right lung base.  No pleural effusion or pneumothorax.   BONES: Sternotomy changes are again noted.   OTHER: An endotracheal tube tip projects 3.8 cm above the jennyfer.  An enteric tube again courses into the left upper quadrant.  A right internal jugular approach [place]-Dev catheter tip again projects over the pulmonary outflow tract.  A mediastinal drain tip again projects over the right suprahilar region.     CXR 11/23/24  On my read possible RML infiltrates  CONCLUSION: Post emergent acute type A aortic dissection repair.  Stable cardiomegaly.  Gross stable/satisfactory position of lines and tubes.  Mild pulmonary vascular congestion.       CXR 11/22/24  CONCLUSION: Post feeding tube placement with tip in the distal esophagus approximately 4 cm above the gastroesophageal junction.  Recommend advancement of the tube by approximately 10  cm to place it in the stomach.     CXR 11/22/24  CARDIAC/MEDIASTINUM: The cardiac silhouette is enlarged and unchanged.. There is a right internal jugular [place]-Dev catheter with tip at the level of the main pulmonary artery. There is a right-sided chest tube. Endotracheal tube with tip approximately    5.3 cm above the jennyfer. There is a nasogastric tube with tip and sidehole within the stomach and below the diaphragm. There are median sternotomy wires and postoperative changes of ascending aortic repair.   LUNGS: There is pulmonary vascular redistribution without edema. Minimal blunting of the bilateral costophrenic angles may be secondary to trace pleural effusions versus chronic pleural thickening. There is mild bibasilar atelectasis. No pneumothorax.   BONES: There is degenerative disease of the thoracic spine.     Chest CTA 11/22/24  CONCLUSION:   1. Type a aortic dissection beginning just above right renal (correction:  Right coronary)  artery and extending distally into the left common iliac artery and external iliac.  Findings were called to Dr. Echavarria at 5:52 p.m.       LABS:  Recent Labs   Lab 12/02/24  0432 12/03/24  0312 12/04/24  0510   RBC 3.09* 3.02* 2.89*   HGB 8.3* 8.0* 7.7*   HCT 25.1* 25.3* 24.2*   MCV 81.2 83.8 83.7   MCH 26.9 26.5 26.6   MCHC 33.1 31.6 31.8   RDW 16.3* 16.5* 16.6*   NEPRELIM 7.08 7.99* 8.52*   WBC 10.4 11.5* 11.9*   .0 194.0 238.0       Recent Labs   Lab 12/02/24  0434 12/02/24  0636 12/03/24  0312 12/04/24  0510   *  --  130*  130* 128*   BUN  --  29* 33*  33* 42*   CREATSERUM 1.46*  --  1.60*  1.60* 1.65*   EGFRCR 41*  --  36*  36* 35*   CA 9.1  --  8.9  8.9 9.0   ALB 3.6  --  3.4 3.5     --  140  140 139   K  --  4.6 4.5  4.5 4.4     --  113*  113* 111   CO2 20.0*  --  19.0*  19.0* 17.0*   ALKPHO  --   --  86  --    AST  --   --  25  --    ALT  --   --  32  --    BILT  --   --  0.3  --    TP  --   --  6.3  --        ASSESSMENT/PLAN:  Type A  aortic dissection s/p repair now intubated in ICU  -on labetolol gtt as per CV surgery  -oral antiHTN meds    Post op acute respiratory failure  -complicated by extubation and reimergent intubation and significant emesis concerning for aspiration PNA vs pneumonitis  -started on zosyn-completed 10 day course  -checked ETT asp-candida likely contaminant  -failed multiple SBTs d/t increased RR, work of breathing, hypertension, hypoxemia, significant thick secretions  -has a + cuff leak per RT  -hx of likely TANYA and previous smoking hx. Has sign dense consolidations and atelectasis on chest CT.  -s/p trach by ENT on 12/4/24 with plans for PEG by GI on 12/5/24  -start trach/vent weaning later today   -PRN ABG and CXR  -saline nebs    Previous hx of smoking and ETOH  -continue duonebs PRN    NAIMA on CKD  -likely from surgery  -monitor UO and renal labs both are improving   -renal following     BG  -insulin and accuchecks    Abd pain  -s/p abd CT as above  -resolved    Proph:  -DVT: hep sq    Dispo:  -full code  -SW/ to assist with LTAC placement    Critical care time 32 min independent of procedures      Thank you for the opportunity to care for Alisha Wilde.      SIDDHARTH Rainey DO, MPH  Pulmonary Critical Care Medicine  [facility]                         [1]   Allergies  Allergen Reactions    Atorvastatin MYALGIA    Pravastatin MYALGIA    Rosuvastatin MYALGIA    Seasonal Runny nose   [2]   Outpatient Medications Marked as Taking for the 11/21/24 encounter (Hospital Encounter)   Medication Sig Dispense Refill    Acetaminophen ER (TYLENOL 8 HOUR) 650 MG Oral Tab CR Take 2 tablets (1,300 mg total) by mouth daily as needed.      Calcium Carb-Cholecalciferol 600-10 MG-MCG Oral Tab Take 1 tablet by mouth daily.      metoprolol succinate ER 50 MG Oral Tablet 24 Hr Take 1 tablet (50 mg total) by mouth daily. 90 tablet 3    Losartan Potassium-HCTZ 100-12.5 MG Oral Tab Take 1  tablet by mouth daily. 90 tablet 3    Potassium Chloride ER 20 MEQ Oral Tab CR Take 1 tablet by mouth daily. 90 tablet 3    albuterol 108 (90 Base) MCG/ACT Inhalation Aero Soln Inhale 2 puffs into the lungs every 4 (four) hours as needed for Wheezing. 3 each 3    amLODIPine 10 MG Oral Tab Take 1 tablet (10 mg total) by mouth daily. 90 tablet 3    Ascorbic Acid (VITAMIN C) 1000 MG Oral Tab Take 1 tablet (1,000 mg total) by mouth daily.      vitamin B-12 50 MCG Oral Tab Take 1 tablet (50 mcg total) by mouth daily.

## 2024-12-04 NOTE — PROGRESS NOTES
[facility]  Nephrology Daily Progress Note    Alisha Wilde  [id number]  59 year old      HPI:   Alisha Wilde is a 59 year old female.  Had trach placed this am. Tolerated well.  G-tube tomorrow.  FiO2 30%.        ROS:     Constitutional:  Negative for decreased activity, fever, irritability and lethargy  Endocrine:  Negative for abnormal sleep patterns, increased activity, polydipsia and polyphagia  Cardiovascular:  Negative for cool extremity and irregular heartbeat/palpitations  Gastrointestinal:  Negative for abdominal pain, constipation, decreased appetite, diarrhea and vomiting  Genitourinary:  Negative for dysuria and hematuria  Hema/Lymph:  Negative for easy bleeding and easy bruising  Integumentary:  Negative for pruritus and rash  Musculoskeletal:  Negative for bone/joint symptoms  Neurological:  Negative for gait disturbance  Psychiatric:  Negative for inappropriate interaction and psychiatric symptoms  Respiratory:  Negative for cough, dyspnea and wheezing      PHYSICAL EXAM:   Temp:  [97.4 °F (36.3 °C)-98.2 °F (36.8 °C)] 98.1 °F (36.7 °C)  Pulse:  [35-83] 76  Resp:  [14-30] 15  BP: ()/() 127/96  SpO2:  [93 %-100 %] 98 %  FiO2 (%):  [30 %-40 %] 30 %  Patient Weight for the past 72 hrs:   Weight   12/03/24 0300 285 lb 7.9 oz (129.5 kg)   12/04/24 0322 291 lb 0.1 oz (132 kg)       Constitutional: appears well hydrated alert and responsive no acute distress noted  Neck/Thyroid: neck is supple without adenopathy  Lymphatic: no abnormal cervical, supraclavicular or axillary adenopathy is noted  Respiratory: normal to inspection lungs are clear to auscultation bilaterally normal respiratory effort  Cardiovascular: regular rate and rhythm no murmurs, gallups, or rubs  Abdomen: soft non-tender non-distended no organomegaly noted no masses  Musculoskeletal: full ROM all extremities good strength  no deformities  Extremities: no edema, cyanosis, or clubbing  Neurological: exam  appropriate for age reflexes and motor skills appropriate for age    Labs:  Lab Results   Component Value Date    WBC 11.9 12/04/2024    HGB 7.7 12/04/2024    HCT 24.2 12/04/2024    .0 12/04/2024    CREATSERUM 1.65 12/04/2024    BUN 42 12/04/2024     12/04/2024    K 4.4 12/04/2024     12/04/2024    CO2 17.0 12/04/2024     12/04/2024    CA 9.0 12/04/2024    ALB 3.5 12/04/2024    PTT 30.7 12/04/2024    INR 1.17 12/04/2024    MG 2.5 12/04/2024    PHOS 5.3 12/04/2024     Recent Labs   Lab 12/02/24  0432 12/03/24  0312 12/04/24  0510   WBC 10.4 11.5* 11.9*   HGB 8.3* 8.0* 7.7*   HCT 25.1* 25.3* 24.2*   .0 194.0 238.0     Recent Labs   Lab 12/02/24  0434 12/02/24  0636 12/03/24  0312 12/04/24  0510   *  --  130*  130* 128*   BUN  --  29* 33*  33* 42*   CREATSERUM 1.46*  --  1.60*  1.60* 1.65*   CA 9.1  --  8.9  8.9 9.0   ALB 3.6  --  3.4 3.5     --  140  140 139   K  --  4.6 4.5  4.5 4.4     --  113*  113* 111   CO2 20.0*  --  19.0*  19.0* 17.0*   ALKPHO  --   --  86  --    AST  --   --  25  --    ALT  --   --  32  --    BILT  --   --  0.3  --    TP  --   --  6.3  --    PHOS 5.2*  --  5.9* 5.3*       Imaging  XR CHEST AP PORTABLE  (CPT=71045)    Result Date: 12/4/2024  CONCLUSION:  1. Mild cardiomegaly and minimally prominent pulmonary vascularity but no huang pulmonary edema.  ET tube and NG tubes have been removed.  Tracheostomy is now noted in the trachea extending to the level the clavicles.  No pneumothorax. 2. Persistent patchy bilateral upper lobe airspace disease right more than left suggesting persistent bilateral upper lobe pneumonia.  Correlate clinically and continued follow-up is advised.    Dictated by (CST): Adalberto Santos MD on 12/04/2024 at 9:39 AM     Finalized by (CST): Adalberto Santos MD on 12/04/2024 at 9:41 AM          XR CHEST AP PORTABLE  (CPT=71045)    Result Date: 12/4/2024  CONCLUSION:  1. Endotracheal and enteric tubes are in stable  customary positioning. 2. New right-sided PICC is in customary positioning.  No pneumothorax. 3. Stable patchy airspace opacities in both lungs compatible with multifocal pneumonia.    Dictated by (CST): Nathanael Hernandez MD on 12/04/2024 at 8:01 AM     Finalized by (CST): Nathanael Hernandez MD on 12/04/2024 at 8:05 AM          XR CHEST AP PORTABLE  (CPT=71045)    Result Date: 12/2/2024  CONCLUSION:  1. Redemonstration of multifocal airspace disease, perhaps indicative of infectious process or developing pulmonary alveolar edema. Follow-up is advised to document resolution.  2. Additional support tubes and lines as above.    elm-remote.   Dictated by (CST): Eulalio Shaikh MD on 12/02/2024 at 7:57 AM     Finalized by (CST): Eulalio Shaikh MD on 12/02/2024 at 8:00 AM             Medications:    Current Facility-Administered Medications:     [START ON 12/5/2024] aspirin 300 MG rectal suppository 300 mg, 300 mg, Rectal, Daily    hydrALAzine (Apresoline) 20 mg/mL injection 10 mg, 10 mg, Intravenous, Q6H PRN    furosemide (Lasix) 10 mg/mL injection 40 mg, 40 mg, Intravenous, Once    niCARdipine in sodium chloride 0.86% (carDENE) 20 mg/200mL infusion premix, 5-15 mg/hr, Intravenous, Continuous    metoclopramide (Reglan) 5 mg/mL injection 5 mg, 5 mg, Intravenous, q6h    [Held by provider] docusate (Colace) 50 MG/5ML oral solution 100 mg, 100 mg, Oral, BID    sodium ferric gluconate (Ferrlecit) 125 mg in sodium chloride 0.9% 100mL IVPB premix, 125 mg, Intravenous, Daily    [Held by provider] isosorbide dinitrate (Isordil) tab 40 mg, 40 mg, Per NG Tube, TID (Nitrates)    labetalol (Trandate) 400 mg in sodium chloride 0.9% 200 mL infusion, 1 mg/min, Intravenous, Continuous    [Held by provider] furosemide (Lasix) 10 mg/mL injection 40 mg, 40 mg, Intravenous, Daily    [Held by provider] hydrALAZINE (Apresoline) tab 150 mg, 150 mg, Oral, Q8H GABRIEL    cloNIDine (Catapres) 0.2 MG/24HR patch 1 patch, 1 patch, Transdermal, Weekly     [Held by provider] carvedilol (Coreg) tab 6.25 mg, 6.25 mg, Oral, BID with meals    [Held by provider] amLODIPine (Norvasc) tab 10 mg, 10 mg, Oral, Daily    sodium chloride 3 % nebulizer solution 3 mL, 3 mL, Nebulization, 4 times per day    ipratropium-albuterol (Duoneb) 0.5-2.5 (3) MG/3ML inhalation solution 3 mL, 3 mL, Nebulization, Q6H PRN    albuterol (Ventolin) (2.5 MG/3ML) 0.083% nebulizer solution 2.5 mg, 2.5 mg, Nebulization, 4 times per day    [Held by provider] HYDROcodone-acetaminophen (Norco) 5-325 MG per tab 2 tablet, 2 tablet, Oral, Q4H PRN    heparin (Porcine) 5000 UNIT/ML injection 5,000 Units, 5,000 Units, Subcutaneous, Q8H GABRIEL    melatonin tab 3 mg, 3 mg, Oral, Nightly PRN    bisacodyl (Dulcolax) 10 MG rectal suppository 10 mg, 10 mg, Rectal, Daily PRN    ondansetron (Zofran) 4 MG/2ML injection 4 mg, 4 mg, Intravenous, Q6H PRN    dexmedeTOMIDine in sodium chloride 0.9% (Precedex) 400 mcg/100mL infusion premix, 0.2-1.5 mcg/kg/hr (Dosing Weight), Intravenous, Continuous    norepinephrine (Levophed) 4 mg/250mL infusion premix, 0.5-30 mcg/min, Intravenous, Continuous PRN    potassium chloride 20 mEq/100mL IVPB premix 20 mEq, 20 mEq, Intravenous, PRN **OR** potassium chloride 40 mEq/100mL IVPB premix (central line) 40 mEq, 40 mEq, Intravenous, PRN    magnesium sulfate in dextrose 5% 1 g/100mL infusion premix 1 g, 1 g, Intravenous, PRN    magnesium sulfate in sterile water for injection 2 g/50mL IVPB premix 2 g, 2 g, Intravenous, PRN    chlorhexidine gluconate (Peridex) 0.12 % oral solution 15 mL, 15 mL, Mouth/Throat, BID    morphINE PF 2 MG/ML injection 2 mg, 2 mg, Intravenous, Q2H PRN **OR** [DISCONTINUED] morphINE PF 4 MG/ML injection 4 mg, 4 mg, Intravenous, Q2H PRN    propofol (Diprivan) 10 mg/mL infusion premix, 5-50 mcg/kg/min (Dosing Weight), Intravenous, Continuous    fentaNYL (Sublimaze) 50 mcg/mL injection 25 mcg, 25 mcg, Intravenous, Q3H PRN    [DISCONTINUED] famotidine (Pepcid) tab 20 mg,  20 mg, Oral, Daily **OR** famotidine (Pepcid) 20 mg/2mL injection 20 mg, 20 mg, Intravenous, Daily    Allergies:  Allergies[1]    Input/Output:    Intake/Output Summary (Last 24 hours) at 12/4/2024 1705  Last data filed at 12/4/2024 1645  Gross per 24 hour   Intake 3547.4 ml   Output 2225 ml   Net 1322.4 ml          ASSESSMENT/PLAN:   Assessment   Patient Active Problem List   Diagnosis    Hypercholesteremia    Alcoholism (HCC)    Essential hypertension    Vitamin D deficiency    Adjustment disorder with mixed anxiety and depressed mood    Controlled type 2 diabetes mellitus without complication, without long-term current use of insulin (HCC)    Obesity (BMI 30-39.9)    Neck mass    Polyp of colon    Flat feet, bilateral    Hypokalemia    Myalgia due to statin    Age-related nuclear cataract of both eyes    Positive for microalbuminuria    Dissection of ascending aorta (HCC)    NAIMA (acute kidney injury) ([facility])    Stage 3 chronic kidney disease (HCC)    Acute respiratory failure with hypoxia ([facility])       UO good 2625 ml but has large input with _+ fluid balance.  Lasix ordered but try and minimize fluid intake as best possible.  Creatinine still creeping up to 1.65.  Hb 7.7.  On ferrlecit. CXR noted.  Discussed with RN.             12/4/2024  Juan Franco MD               [1]   Allergies  Allergen Reactions    Atorvastatin MYALGIA    Pravastatin MYALGIA    Rosuvastatin MYALGIA    Seasonal Runny nose

## 2024-12-04 NOTE — PROGRESS NOTES
[facility]     Gastroenterology Progress Note    Alisha Wilde Patient Status:  Inpatient    10/25/1965 MRN [id number]   Location [facility] 2W/SW Attending Missy Paulson MD   Hosp Day # 12 PCP Bridger Avendano MD       Subjective:   Pt minimally responsive  Had OGT with TF prior to trach placement without issues.  No BM per nursing  Objective:   Blood pressure 131/60, pulse 76, temperature 97.6 °F (36.4 °C), temperature source Axillary, resp. rate 18, height 5' 5\" (1.651 m), weight 291 lb 0.1 oz (132 kg), SpO2 98%, not currently breastfeeding. Body mass index is 48.43 kg/m².    Gen: awake, drowsy patient, NAD  HEENT: EOMI, the sclera appears anicteric, oropharynx clear, mucus membranes appear moist  CV: RRR  Lung: no conversational dyspnea   Abdomen: soft NT, distended abdomen with high pitched BS appreciated, tympany to percussion   Skin: dry, warm, no jaundice  Ext: + LE edema is evident  Neuro: Drowsy, unable to assess orientation and not interactive  Psych: calm, cooperative    Assessment and Plan:   Alisha Wilde is a 59 year old female w/ PMHx of hypertension, GERD, CKD, hyperlipidemia who presented to ER 2024 for chest pain. Underwent emergent repair of aortic type A dissection on 2024. Has not been able to weaned from vent. Patient does not answer to questions.      #PEG consult  -GI PA previously discussed/consented Pt's brother/POA, [place] for PEG placement who stated understanding and agreed.  -Underwent Trach today with post-up bradycardia requiring atropine  -ABD distended with tympany to percussion, will order KUB.  RN notes previously tolerating TF via OGT though no BM documented in flowsheets for several days.  Pt receiving IV reglan.     -Plan for PEG placement tomorrow pending hemodynamic stability over the next 24 hours and KUB results.      Recommend:  -NPO  -KUB  -Monitor VS, if any abnormalities in the next 24 hours would hold on endoscopic  sedation  -PEG tube placement pending above, plan for tomorrow    Case discussed with Toma Barrientos MD and Rigoberto RN.    [facility], FNP-BC  [facility]  12/4/2024      Results:     Lab Results   Component Value Date    WBC 11.9 (H) 12/04/2024    HGB 7.7 (L) 12/04/2024    HCT 24.2 (L) 12/04/2024    .0 12/04/2024    CREATSERUM 1.65 (H) 12/04/2024    BUN 42 (H) 12/04/2024     12/04/2024    K 4.4 12/04/2024     12/04/2024    CO2 17.0 (L) 12/04/2024     (H) 12/04/2024    CA 9.0 12/04/2024    ALB 3.5 12/04/2024    ALKPHO 86 12/03/2024    BILT 0.3 12/03/2024    TP 6.3 12/03/2024    AST 25 12/03/2024    ALT 32 12/03/2024    PTT 30.7 12/04/2024    INR 1.17 12/04/2024    TSH 0.704 09/29/2024    NATHALIE 99 11/25/2024    LIP 28 11/25/2024    DDIMER 0.42 12/08/2019    ESRML 15 06/05/2021    MG 2.5 12/04/2024    PHOS 5.3 (H) 12/04/2024    TROP <0.045 12/08/2019     (H) 09/23/2021    B12 1,156 (H) 07/23/2018       XR CHEST AP PORTABLE  (CPT=71045)    Result Date: 12/4/2024  CONCLUSION:  1. Mild cardiomegaly and minimally prominent pulmonary vascularity but no huang pulmonary edema.  ET tube and NG tubes have been removed.  Tracheostomy is now noted in the trachea extending to the level the clavicles.  No pneumothorax. 2. Persistent patchy bilateral upper lobe airspace disease right more than left suggesting persistent bilateral upper lobe pneumonia.  Correlate clinically and continued follow-up is advised.    Dictated by (CST): Adalberto Santos MD on 12/04/2024 at 9:39 AM     Finalized by (CST): Adalberto Santos MD on 12/04/2024 at 9:41 AM          XR CHEST AP PORTABLE  (CPT=71045)    Result Date: 12/4/2024  CONCLUSION:  1. Endotracheal and enteric tubes are in stable customary positioning. 2. New right-sided PICC is in customary positioning.  No pneumothorax. 3. Stable patchy airspace opacities in both lungs compatible with multifocal pneumonia.    Dictated by (CST): Nathanael Hernandez,  MD on 12/04/2024 at 8:01 AM     Finalized by (CST): Nathanael Hernandez MD on 12/04/2024 at 8:05 AM

## 2024-12-04 NOTE — RESPIRATORY THERAPY NOTE
Problem: RESPIRATORY - ADULT  Goal: Achieves optimal ventilation and oxygenation  Description: INTERVENTIONS:  - Assess for changes in respiratory status  - Assess for changes in mentation and behavior  - Position to facilitate oxygenation and minimize respiratory effort  - Oxygen supplementation based on oxygen saturation or ABGs  - Provide Smoking Cessation handout, if applicable  - Encourage broncho-pulmonary hygiene including cough, deep breathe, Incentive Spirometry  - Assess the need for suctioning and perform as needed  - Assess and instruct to report SOB or any respiratory difficulty  - Respiratory Therapy support as indicated  - Manage/alleviate anxiety  - Monitor for signs/symptoms of CO2 retention  Outcome: Progressing    Patient received  on ventilator. Patient requires chronic ventilator support and is not a candidate for SBT. Bilateral breath sounds auscultated. Suction provided when indicated. No acute events. RT will continue to monitor.

## 2024-12-04 NOTE — PLAN OF CARE
Requiring increased Labetalol infusion/ increased volume. Will order Cardene infusion, try to switch infusions. Parameters in place.     Collette An, MSN, FNP-BC, CCK  12/04/24   4:34 PM  [phone] [place]  [phone] [place]

## 2024-12-04 NOTE — PROGRESS NOTES
[facility]  part of [facility]    Progress Note    Alisha Wilde Patient Status:  Inpatient    10/25/1965 MRN [id number]   Location [facility] 2W/SW Attending Hayde Brito MD   Hosp Day # 12 PCP Bridger Avendano MD     Chief Complaint:   Chief Complaint   Patient presents with    Chest Pain Angina       Subjective:   Alisha Wilde has trach collar in place. Awake and alert.     Objective:   Objective:    Blood pressure 148/69, pulse 78, temperature 97.6 °F (36.4 °C), temperature source Axillary, resp. rate 18, height 5' 5\" (1.651 m), weight 291 lb 0.1 oz (132 kg), SpO2 100%, not currently breastfeeding.    Physical Exam:    General: No acute distress. Intubated.   Respiratory: Diminished bases   Cardiovascular: S1, S2. Regular rate and rhythm. No murmurs, rubs or gallops.   Abdomen: Soft, nontender, distended.  Positive bowel sounds. No rebound or guarding.  Extremities: 2+ pitting edema x 4 ext    Results:   Results:    Labs:  Recent Labs   Lab 24  0457 24  0510 24  0432 24  0312 24  0510   WBC 12.2* 11.9* 10.4 11.5* 11.9*   HGB 8.8* 7.7* 8.3* 8.0* 7.7*   MCV 83.2 82.7 81.2 83.8 83.7   .0 168.0 211.0 194.0 238.0   BAND 5 3 10 3 4   INR  --   --   --   --  1.17       Recent Labs   Lab 24  0434 24  0636 24  0312 24  0510   *  --  130*  130* 128*   BUN  --  29* 33*  33* 42*   CREATSERUM 1.46*  --  1.60*  1.60* 1.65*   CA 9.1  --  8.9  8.9 9.0   ALB 3.6  --  3.4 3.5     --  140  140 139   K  --  4.6 4.5  4.5 4.4     --  113*  113* 111   CO2 20.0*  --  19.0*  19.0* 17.0*   ALKPHO  --   --  86  --    AST  --   --  25  --    ALT  --   --  32  --    BILT  --   --  0.3  --    TP  --   --  6.3  --        Estimated Creatinine Clearance: 32.2 mL/min (A) (based on SCr of 1.65 mg/dL (H)).    Recent Labs   Lab 24  0510   PTP 15.6*   INR 1.17              Culture:  No results found for this visit on  11/21/24.    Cardiac  No results for input(s): \"TROP\", \"PBNP\" in the last 168 hours.        Imaging: Imaging data reviewed in [facility].  XR CHEST AP PORTABLE  (CPT=71045)    Result Date: 12/4/2024  CONCLUSION:  1. Mild cardiomegaly and minimally prominent pulmonary vascularity but no huang pulmonary edema.  ET tube and NG tubes have been removed.  Tracheostomy is now noted in the trachea extending to the level the clavicles.  No pneumothorax. 2. Persistent patchy bilateral upper lobe airspace disease right more than left suggesting persistent bilateral upper lobe pneumonia.  Correlate clinically and continued follow-up is advised.    Dictated by (CST): Adalberto Santos MD on 12/04/2024 at 9:39 AM     Finalized by (CST): Adalberto Santos MD on 12/04/2024 at 9:41 AM          XR CHEST AP PORTABLE  (CPT=71045)    Result Date: 12/4/2024  CONCLUSION:  1. Endotracheal and enteric tubes are in stable customary positioning. 2. New right-sided PICC is in customary positioning.  No pneumothorax. 3. Stable patchy airspace opacities in both lungs compatible with multifocal pneumonia.    Dictated by (CST): Nathanael Hernandez MD on 12/04/2024 at 8:01 AM     Finalized by (CST): Nathanael Hernandez MD on 12/04/2024 at 8:05 AM           Medications:    [START ON 12/5/2024] aspirin  300 mg Rectal Daily    metoclopramide  5 mg Intravenous q6h    [Held by provider] docusate  100 mg Oral BID    sodium ferric gluconate  125 mg Intravenous Daily    [Held by provider] isosorbide dinitrate  40 mg Per NG Tube TID (Nitrates)    [Held by provider] furosemide  40 mg Intravenous Daily    [Held by provider] hydrALAZINE  150 mg Oral Q8H [facility]    cloNIDine  1 patch Transdermal Weekly    [Held by provider] carvedilol  6.25 mg Oral BID with meals    [Held by provider] amLODIPine  10 mg Oral Daily    sodium chloride  3 mL Nebulization 4 times per day    albuterol  2.5 mg Nebulization 4 times per day    heparin  5,000 Units Subcutaneous Q8H [facility]    chlorhexidine  gluconate  15 mL Mouth/Throat BID    famotidine  20 mg Intravenous Daily         Assessment and Plan:   Assessment & Plan:      Type A aortic dissection   S/p emergent repair 11/22/24   Off NTG gtt   IV labetalol prn- Cards managing   New picc 12/2  CV surgery, cards and critical care on consult.   TTE LVEF 75-80%.   Cont BP control.   Acute hypoxic respiratory failure   Aspiration event   Completed 10 days of zosyn.   Sputum cx candida   Failed SBT multiple times. Plans for trach tomorrow. PEG 12/5   ENT and GI on consult.   Pulmonary on consult.   hold lasix as creat creeping up   CXR with multifocal airspace dz  Albuterol nebs   Status post trach placement 12/4  PEG tube to be placed today  H/o tobacco and ETOH abuse   Duonebs PRN   NAIMA on CKD III   Renal on consult.   Stem to be secondary to MARY LOU/ATN   BUN/creat trending up. Hold lasix.   Essential HTN   Coreg 6.25mg BID, norvasc 10mg daily, hydralazine 150mg TID. Clonidinie patch 0.2mg daily isosorbide 40mg TID.   Stop lasix.   ARB on hold due to NAIMA.   Labetalol IV PRN   Iron def anemia  IV iron.   Iron level low   Transfuse for Hb < 7.0   Other medical problems  Morbid Obesity   TANYA     >55min spent, >50% spent counseling and coordinating care in the form of educating pt/family and d/w consultants and staff. Most of the time spent discussing the above plan.        Plan of care discussed with patient or family at bedside.      Missy Paulson MD  Hospitalist          Supplementary Documentation:     Quality:  DVT Prophylaxis: heparin   CODE status: Full  Dispo: per clinical course            Estimated date of discharge: TBD  Discharge is dependent on: clinical stability  At this point Ms. Wilde is expected to be discharge to: TBD

## 2024-12-05 ENCOUNTER — APPOINTMENT (OUTPATIENT)
Dept: GENERAL RADIOLOGY | Facility: HOSPITAL | Age: 59
DRG: 003 | End: 2024-12-05
Attending: NURSE PRACTITIONER
Payer: COMMERCIAL

## 2024-12-05 ENCOUNTER — APPOINTMENT (OUTPATIENT)
Dept: GENERAL RADIOLOGY | Facility: HOSPITAL | Age: 59
End: 2024-12-05
Attending: NURSE PRACTITIONER
Payer: COMMERCIAL

## 2024-12-05 LAB
ALBUMIN SERPL-MCNC: 3.6 G/DL (ref 3.2–4.8)
ALBUMIN/GLOB SERPL: 1.3 {RATIO} (ref 1–2)
ALP LIVER SERPL-CCNC: 86 U/L
ALT SERPL-CCNC: 33 U/L
ANION GAP SERPL CALC-SCNC: 11 MMOL/L (ref 0–18)
AST SERPL-CCNC: 31 U/L (ref ?–34)
BILIRUB SERPL-MCNC: 0.4 MG/DL (ref 0.2–1.1)
BLOOD TYPE BARCODE: 7300
BUN BLD-MCNC: 41 MG/DL (ref 9–23)
BUN/CREAT SERPL: 25.5 (ref 10–20)
CALCIUM BLD-MCNC: 9.1 MG/DL (ref 8.7–10.4)
CHLORIDE SERPL-SCNC: 114 MMOL/L (ref 98–112)
CHOLEST SERPL-MCNC: 145 MG/DL (ref ?–200)
CO2 SERPL-SCNC: 17 MMOL/L (ref 21–32)
CREAT BLD-MCNC: 1.61 MG/DL
DEPRECATED RDW RBC AUTO: 50.4 FL (ref 35.1–46.3)
EGFRCR SERPLBLD CKD-EPI 2021: 36 ML/MIN/1.73M2 (ref 60–?)
ERYTHROCYTE [DISTWIDTH] IN BLOOD BY AUTOMATED COUNT: 16.5 % (ref 11–15)
GLOBULIN PLAS-MCNC: 2.7 G/DL (ref 2–3.5)
GLUCOSE BLD-MCNC: 120 MG/DL (ref 70–99)
GLUCOSE BLDC GLUCOMTR-MCNC: 133 MG/DL (ref 70–99)
GLUCOSE BLDC GLUCOMTR-MCNC: 135 MG/DL (ref 70–99)
HCT VFR BLD AUTO: 26.3 %
HDLC SERPL-MCNC: 17 MG/DL (ref 40–59)
HGB BLD-MCNC: 9 G/DL
LDLC SERPL CALC-MCNC: 84 MG/DL (ref ?–100)
MCH RBC QN AUTO: 28.4 PG (ref 26–34)
MCHC RBC AUTO-ENTMCNC: 34.2 G/DL (ref 31–37)
MCV RBC AUTO: 83 FL
NONHDLC SERPL-MCNC: 128 MG/DL (ref ?–130)
OSMOLALITY SERPL CALC.SUM OF ELEC: 305 MOSM/KG (ref 275–295)
PLATELET # BLD AUTO: 224 10(3)UL (ref 150–450)
POTASSIUM SERPL-SCNC: 4 MMOL/L (ref 3.5–5.1)
PROT SERPL-MCNC: 6.3 G/DL (ref 5.7–8.2)
RBC # BLD AUTO: 3.17 X10(6)UL
SODIUM SERPL-SCNC: 142 MMOL/L (ref 136–145)
TRIGL SERPL-MCNC: 261 MG/DL (ref 30–149)
UNIT VOLUME: 350 ML
VLDLC SERPL CALC-MCNC: 42 MG/DL (ref 0–30)
WBC # BLD AUTO: 12.2 X10(3) UL (ref 4–11)

## 2024-12-05 PROCEDURE — 99233 SBSQ HOSP IP/OBS HIGH 50: CPT | Performed by: INTERNAL MEDICINE

## 2024-12-05 PROCEDURE — 43246 EGD PLACE GASTROSTOMY TUBE: CPT | Performed by: INTERNAL MEDICINE

## 2024-12-05 PROCEDURE — 71045 X-RAY EXAM CHEST 1 VIEW: CPT | Performed by: NURSE PRACTITIONER

## 2024-12-05 PROCEDURE — 0DH63UZ INSERTION OF FEEDING DEVICE INTO STOMACH, PERCUTANEOUS APPROACH: ICD-10-PCS | Performed by: INTERNAL MEDICINE

## 2024-12-05 PROCEDURE — 99291 CRITICAL CARE FIRST HOUR: CPT | Performed by: INTERNAL MEDICINE

## 2024-12-05 PROCEDURE — 99233 SBSQ HOSP IP/OBS HIGH 50: CPT | Performed by: HOSPITALIST

## 2024-12-05 DEVICE — PERCUTANEOUS ENDOSCOPIC GASTROSTOMY KIT ENFIT
Type: IMPLANTABLE DEVICE | Status: FUNCTIONAL
Brand: ENDOVIVE SAFETY PEG KIT

## 2024-12-05 RX ORDER — CLONIDINE HYDROCHLORIDE 0.1 MG/1
0.3 TABLET ORAL 3 TIMES DAILY
Status: COMPLETED | OUTPATIENT
Start: 2024-12-06 | End: 2024-12-06

## 2024-12-05 RX ORDER — FUROSEMIDE 10 MG/ML
40 INJECTION INTRAMUSCULAR; INTRAVENOUS ONCE
Status: COMPLETED | OUTPATIENT
Start: 2024-12-05 | End: 2024-12-05

## 2024-12-05 RX ORDER — CLONIDINE HYDROCHLORIDE 0.1 MG/1
0.3 TABLET ORAL 2 TIMES DAILY
Status: DISCONTINUED | OUTPATIENT
Start: 2024-12-07 | End: 2024-12-09

## 2024-12-05 RX ORDER — CLONIDINE HYDROCHLORIDE 0.1 MG/1
0.3 TABLET ORAL 4 TIMES DAILY
Status: COMPLETED | OUTPATIENT
Start: 2024-12-05 | End: 2024-12-05

## 2024-12-05 RX ORDER — CEFAZOLIN SODIUM IN 0.9 % NACL 3 G/100 ML
3 INTRAVENOUS SOLUTION, PIGGYBACK (ML) INTRAVENOUS
Status: COMPLETED | OUTPATIENT
Start: 2024-12-05 | End: 2024-12-05

## 2024-12-05 RX ORDER — SENNOSIDES 8.8 MG/5ML
5 LIQUID ORAL 2 TIMES DAILY
Status: DISCONTINUED | OUTPATIENT
Start: 2024-12-05 | End: 2024-12-11

## 2024-12-05 RX ORDER — QUETIAPINE FUMARATE 25 MG/1
25 TABLET, FILM COATED ORAL 2 TIMES DAILY
Status: DISCONTINUED | OUTPATIENT
Start: 2024-12-05 | End: 2024-12-06

## 2024-12-05 NOTE — PLAN OF CARE
Problem: Patient Centered Care  Goal: Patient preferences are identified and integrated in the patient's plan of care  Description: Interventions:  - What would you like us to know as we care for you? I work at [employer] and I am still very good friends with people from grade school! My brother, Osbaldo Prince, has been making decisions for me.  - Provide timely, complete, and accurate information to patient/family  - Incorporate patient and family knowledge, values, beliefs, and cultural backgrounds into the planning and delivery of care  - Encourage patient/family to participate in care and decision-making at the level they choose  - Honor patient and family perspectives and choices  Outcome: Progressing     Problem: Diabetes/Glucose Control  Goal: Glucose maintained within prescribed range  Description: INTERVENTIONS:  - Monitor Blood Glucose as ordered  - Assess for signs and symptoms of hyperglycemia and hypoglycemia  - Administer ordered medications to maintain glucose within target range  - Assess barriers to adequate nutritional intake and initiate nutrition consult as needed  - Instruct patient on self management of diabetes  Outcome: Progressing     Problem: Patient/Family Goals  Goal: Patient/Family Long Term Goal  Description: Patient's Long Term Goal: To discharge from the hospital safely    Interventions:  - surgical interventions, rounding, medications  - See additional Care Plan goals for specific interventions  Outcome: Progressing  Goal: Patient/Family Short Term Goal  Description: Patient's Short Term Goal: to breathe better    Interventions:   - manage the ventilator for pt until she is able to breath on her own.  - See additional Care Plan goals for specific interventions  Outcome: Progressing     Problem: CARDIOVASCULAR - ADULT  Goal: Maintains optimal cardiac output and hemodynamic stability  Description: INTERVENTIONS:  - Monitor vital signs, rhythm, and trends  - Monitor for  bleeding, hypotension and signs of decreased cardiac output  - Evaluate effectiveness of vasoactive medications to optimize hemodynamic stability  - Monitor arterial and/or venous puncture sites for bleeding and/or hematoma  - Assess quality of pulses, skin color and temperature  - Assess for signs of decreased coronary artery perfusion - ex. Angina  - Evaluate fluid balance, assess for edema, trend weights  Outcome: Progressing  Goal: Absence of cardiac arrhythmias or at baseline  Description: INTERVENTIONS:  - Continuous cardiac monitoring, monitor vital signs, obtain 12 lead EKG if indicated  - Evaluate effectiveness of antiarrhythmic and heart rate control medications as ordered  - Initiate emergency measures for life threatening arrhythmias  - Monitor electrolytes and administer replacement therapy as ordered  Outcome: Progressing     Problem: RESPIRATORY - ADULT  Goal: Achieves optimal ventilation and oxygenation  Description: INTERVENTIONS:  - Assess for changes in respiratory status  - Assess for changes in mentation and behavior  - Position to facilitate oxygenation and minimize respiratory effort  - Oxygen supplementation based on oxygen saturation or ABGs  - Provide Smoking Cessation handout, if applicable  - Encourage broncho-pulmonary hygiene including cough, deep breathe, Incentive Spirometry  - Assess the need for suctioning and perform as needed  - Assess and instruct to report SOB or any respiratory difficulty  - Respiratory Therapy support as indicated  - Manage/alleviate anxiety  - Monitor for signs/symptoms of CO2 retention  Outcome: Progressing     Problem: GASTROINTESTINAL - ADULT  Goal: Minimal or absence of nausea and vomiting  Description: INTERVENTIONS:  - Maintain adequate hydration with IV or PO as ordered and tolerated  - Nasogastric tube to low intermittent suction as ordered  - Evaluate effectiveness of ordered antiemetic medications  - Provide nonpharmacologic comfort measures as  appropriate  - Advance diet as tolerated, if ordered  - Obtain nutritional consult as needed  - Evaluate fluid balance  Outcome: Progressing  Goal: Maintains or returns to baseline bowel function  Description: INTERVENTIONS:  - Assess bowel function  - Maintain adequate hydration with IV or PO as ordered and tolerated  - Evaluate effectiveness of GI medications  - Encourage mobilization and activity  - Obtain nutritional consult as needed  - Establish a toileting routine/schedule  - Consider collaborating with pharmacy to review patient's medication profile  Outcome: Progressing     Problem: METABOLIC/FLUID AND ELECTROLYTES - ADULT  Goal: Glucose maintained within prescribed range  Description: INTERVENTIONS:  - Monitor Blood Glucose as ordered  - Assess for signs and symptoms of hyperglycemia and hypoglycemia  - Administer ordered medications to maintain glucose within target range  - Assess barriers to adequate nutritional intake and initiate nutrition consult as needed  - Instruct patient on self management of diabetes  Outcome: Progressing  Goal: Electrolytes maintained within normal limits  Description: INTERVENTIONS:  - Monitor labs and rhythm and assess patient for signs and symptoms of electrolyte imbalances  - Administer electrolyte replacement as ordered  - Monitor response to electrolyte replacements, including rhythm and repeat lab results as appropriate  - Fluid restriction as ordered  - Instruct patient on fluid and nutrition restrictions as appropriate  Outcome: Progressing  Goal: Hemodynamic stability and optimal renal function maintained  Description: INTERVENTIONS:  - Monitor labs and assess for signs and symptoms of volume excess or deficit  - Monitor intake, output and patient weight  - Monitor urine specific gravity, serum osmolarity and serum sodium as indicated or ordered  - Monitor response to interventions for patient's volume status, including labs, urine output, blood pressure (other  measures as available)  - Encourage oral intake as appropriate  - Instruct patient on fluid and nutrition restrictions as appropriate  Outcome: Progressing     Problem: SKIN/TISSUE INTEGRITY - ADULT  Goal: Skin integrity remains intact  Description: INTERVENTIONS  - Assess and document risk factors for pressure ulcer development  - Assess and document skin integrity  - Monitor for areas of redness and/or skin breakdown  - Initiate interventions, skin care algorithm/standards of care as needed  Outcome: Progressing  Goal: Incision(s), wounds(s) or drain site(s) healing without S/S of infection  Description: INTERVENTIONS:  - Assess and document risk factors for pressure ulcer development  - Assess and document skin integrity  - Assess and document dressing/incision, wound bed, drain sites and surrounding tissue  - Implement wound care per orders  - Initiate isolation precautions as appropriate  - Initiate Pressure Ulcer prevention bundle as indicated  Outcome: Progressing  Goal: Oral mucous membranes remain intact  Description: INTERVENTIONS  - Assess oral mucosa and hygiene practices  - Implement preventative oral hygiene regimen  - Implement oral medicated treatments as ordered  Outcome: Progressing     Problem: HEMATOLOGIC - ADULT  Goal: Maintains hematologic stability  Description: INTERVENTIONS  - Assess for signs and symptoms of bleeding or hemorrhage  - Monitor labs and vital signs for trends  - Administer supportive blood products/factors, fluids and medications as ordered and appropriate  - Administer supportive blood products/factors as ordered and appropriate  Outcome: Progressing     Problem: MUSCULOSKELETAL - ADULT  Goal: Return mobility to safest level of function  Description: INTERVENTIONS:  - Assess patient stability and activity tolerance for standing, transferring and ambulating w/ or w/o assistive devices  - Assist with transfers and ambulation using safe patient handling equipment as needed  -  Ensure adequate protection for wounds/incisions during mobilization  - Obtain PT/OT consults as needed  - Advance activity as appropriate  - Communicate ordered activity level and limitations with patient/family  Outcome: Progressing  Goal: Maintain proper alignment of affected body part  Description: INTERVENTIONS:  - Support and protect limb and body alignment per provider's orders  - Instruct and reinforce with patient and family use of appropriate assistive device and precautions (e.g. spinal or hip dislocation precautions)  Outcome: Progressing     Problem: NEUROLOGICAL - ADULT  Goal: Achieves stable or improved neurological status  Description: INTERVENTIONS  - Assess for and report changes in neurological status  - Initiate measures to prevent increased intracranial pressure  - Maintain blood pressure and fluid volume within ordered parameters to optimize cerebral perfusion and minimize risk of hemorrhage  - Monitor temperature, glucose, and sodium. Initiate appropriate interventions as ordered  Outcome: Progressing     Problem: PAIN - ADULT  Goal: Verbalizes/displays adequate comfort level or patient's stated pain goal  Description: INTERVENTIONS:  - Encourage pt to monitor pain and request assistance  - Assess pain using appropriate pain scale  - Administer analgesics based on type and severity of pain and evaluate response  - Implement non-pharmacological measures as appropriate and evaluate response  - Consider cultural and social influences on pain and pain management  - Manage/alleviate anxiety  - Utilize distraction and/or relaxation techniques  - Monitor for opioid side effects  - Notify MD/LIP if interventions unsuccessful or patient reports new pain  - Anticipate increased pain with activity and pre-medicate as appropriate  Outcome: Progressing  Pt minimally responsive, new trach today, site cdi , drsg with old drainage, o2 sats %on fio2 30%,  Sx'd via trach for sm to mod amt thick whitish  secretions. Strong cough, restraints removed on days, hampton weakly, Excellent u/o 1700 at 10pm, 1100cc at 0100. Clear yellow urine.

## 2024-12-05 NOTE — INTERVAL H&P NOTE
Pre-op Diagnosis: Chronic respiratory failure, unspecified whether with hypoxia or hypercapnia (HCC) [J96.10]    The above referenced H&P was reviewed by Toma Barney Ma, MD on 12/5/2024, the patient was examined and no significant changes have occurred in the patient's condition since the H&P was performed.  I discussed with the patient and/or legal representative the potential benefits, risks and side effects of this procedure; the likelihood of the patient achieving goals; and potential problems that might occur during recuperation.  I discussed reasonable alternatives to the procedure, including risks, benefits and side effects related to the alternatives and risks related to not receiving this procedure.  We will proceed with procedure as planned.

## 2024-12-05 NOTE — PROGRESS NOTES
Progress Note     Alisha Wilde Patient Status:  Inpatient    10/25/1965 MRN [id number]   Location [facility] 2W/SW Attending Missy Paulson MD   Hosp Day # 13 PCP Bridger Avendano MD     Chief Complaint: POD 1 s/p tracheostomy     Subjective:   S: Patient underwent PEG tube placement this morning. No issues with tracheostomy.     Review of Systems:   10 point ROS completed and was negative, except for pertinent positive and negatives stated in subjective.    Objective:   Vital signs:  Temp:  [97.4 °F (36.3 °C)-98.4 °F (36.9 °C)] 98.4 °F (36.9 °C)  Pulse:  [74-83] 74  Resp:  [11-24] 18  BP: (127-159)/(58-96) 142/64  SpO2:  [96 %-100 %] 99 %  FiO2 (%):  [30 %-40 %] 30 %    Wt Readings from Last 3 Encounters:   24 282 lb 3 oz (128 kg)   10/24/24 254 lb (115.2 kg)   24 249 lb (112.9 kg)       Intake/Output:    Intake/Output Summary (Last 24 hours) at 2024 1234  Last data filed at 2024 1200  Gross per 24 hour   Intake 3376.42 ml   Output 4255 ml   Net -878.58 ml         Vent Mode: VC+  FiO2 (%):  [30 %-40 %] 30 %  S RR:  [18] 18  S VT:  [550 mL] 550 mL  PEEP/CPAP (cm H2O):  [5 cm H20] 5 cm H20  MAP (cm H2O):  [13-15] 14  General: no apparent distress, morbid obesity  Respiratory: On ventilatory support  ENT:  --OC/OP: Tongue is edematous and protruding from the mouth.   --Neck: 8 cuffed portex tracheostomy in place, cuff inflated, no active bleeding, faceplate sutures and stay sutures in place, soft trach collar in place   --Chest: sternotomy incision c/d/i    Results:   Diagnostic Data:      Labs:    Selected labs - last 24 hours:  Endo  Lytes  Renal   Glu 120  Na 142 Ca 9.1  BUN 41   POC Gluc  133  K 4.0 PO4 -  Cr 1.61   A1c -  Cl 114 Mg -  eGFR 36   TSH -  CO2 17.0         LFT  CBC  Other   AST 31  WBC 12.2  PTT - Procal -   ALT 33  Hb 9.0  INR - CRP -   APk 86  Hct 26.3  Trop - D dim -   T miranda 0.4  .0  pBNP -  BNP -  - Ferritin  -   Prot 6.3    CK  - Lactate  -   Alb 3.6     LDL  - COVID  -     Imaging: Imaging data reviewed in Epic.    Medications:    aspirin  300 mg Rectal Daily    metoclopramide  5 mg Intravenous q6h    docusate  100 mg Oral BID    sodium ferric gluconate  125 mg Intravenous Daily    isosorbide dinitrate  40 mg Per NG Tube TID (Nitrates)    [Held by provider] furosemide  40 mg Intravenous Daily    hydrALAZINE  150 mg Oral Q8H [facility]    cloNIDine  1 patch Transdermal Weekly    carvedilol  6.25 mg Oral BID with meals    amLODIPine  10 mg Oral Daily    sodium chloride  3 mL Nebulization 4 times per day    albuterol  2.5 mg Nebulization 4 times per day    heparin  5,000 Units Subcutaneous Q8H [facility]    chlorhexidine gluconate  15 mL Mouth/Throat BID    famotidine  20 mg Intravenous Daily       Assessment & Plan:   ASSESSMENT / PLAN:     Respiratory failure and prolonged intubation POD 1 s/p tracheostomy    -Patient is able to be intubated from above   -No one other than ENT to remove sutures. Will plan to remove sutures on POD 2. Stay suture taped to chest as the patient has a large neck.   -Obturator at bedside at all times  -Will continue to monitor    Myron Cai MD    Supplementary Documentation:     Addendum: 12/6/2024  As patient is not planned to be discharged over the weekend, will wait until POD 5 to remove sutures as patient has difficult anatomy and deep neck for tracheostomy placement.

## 2024-12-05 NOTE — PLAN OF CARE
Problem: RESPIRATORY - ADULT  Goal: Achieves optimal ventilation and oxygenation  Description: INTERVENTIONS:  - Assess for changes in respiratory status  - Assess for changes in mentation and behavior  - Position to facilitate oxygenation and minimize respiratory effort  - Oxygen supplementation based on oxygen saturation or ABGs  - Provide Smoking Cessation handout, if applicable  - Encourage broncho-pulmonary hygiene including cough, deep breathe, Incentive Spirometry  - Assess the need for suctioning and perform as needed  - Assess and instruct to report SOB or any respiratory difficulty  - Respiratory Therapy support as indicated  - Manage/alleviate anxiety  - Monitor for signs/symptoms of CO2 retention  Outcome: Progressing     Pt received with tracheostomy Portex 8.0 and full vent support. Pt is stable and tolerating well, suction provided as needed, FiO2 wean down to 30%. Trach care provided.    Vent settings and readings as follow:     12/04/24 2027   Vent Information   Interface Invasive   Vent Type    Vent plugged into main power? Yes   Vent ID    Vent Mode VC+   Settings   FiO2 (%) 30 %   Resp Rate (Set) 18   Vt (Set, mL) 550 mL   Waveform Decelerating ramp   PEEP/CPAP (cm H2O) 5 cm H20   Insp Time (sec) 0.8 sec   Trigger Sensitivity Flow (L/min) 2 L/min   Humidification Heater   H2O Bag Level 3/4 Full   Heater Temperature 99.1 °F (37.3 °C)   Readings   Total RR 19   Minute Ventilation (L/min) 10.3 L/min   Expiratory Tidal Volume 579 mL   PIP Observed (cm H2O) 32 cm H2O   MAP (cm H2O) 13   I/E Ratio 1:2.4   Plateau Pressure (cm H2O) 28 cm H2O   Static Compliance (L/cm H2O) 25   Dynamic Compliance (L/cm H2O) 42 L/cm H2O   Alarms   High RR 40   Insp Pressure High (cm H2O) 50 cm H2O   Insp Pressure Low (cm H2O) 9 cm H2O   MV High (L/min) 20 L/min   MV Low (L/min) 3 L/min   Apnea Interval (sec) 20 seconds   Apnea Rate 18   Apnea Volume (mL) 550 mL      12/04/24 2027   Respiratory Assessment    Respiratory Pattern Regular   Cough Productive   Sputum Amount Scant   Sputum Color Hemoptysis   Sputum Consistency Thick   Sputum How Obtained Tracheal   Surgical Airway Portex 8 mm Cuffed   Placement Date/Time: 12/04/24 0826   Placed By: Surgeon  Brand: Portex  Airway size (mm): 8 mm  Style: Cuffed   Status Secured;Sutured in place   Site Assessment No bleeding;Crusty   Cuff Pressure (cm H2O) 31 cm H2O   Suctioned? Y   Inner Cannula Care No inner cannula   Ties Assessment Intact;Secure   Req'd equipment at bedside Trach tube;Bag mask   Additional Assessments   Pulse 76   Resp 19   SpO2 96 %

## 2024-12-05 NOTE — PROGRESS NOTES
[facility]  Nephrology Daily Progress Note    Alisha Wilde  [id number]  59 year old      HPI:   Alisha Wilde is a 59 year old female.  Intubated and sedated.  No problems wuth trach.  To get PEG shortly. O2 sats 30%.        ROS:     Unable    PHYSICAL EXAM:   Temp:  [97.4 °F (36.3 °C)-98.2 °F (36.8 °C)] 98.1 °F (36.7 °C)  Pulse:  [74-83] 75  Resp:  [11-24] 18  BP: (127-168)/() 139/62  SpO2:  [96 %-100 %] 98 %  FiO2 (%):  [30 %-40 %] 30 %  Patient Weight for the past 72 hrs:   Weight   12/03/24 0300 285 lb 7.9 oz (129.5 kg)   12/04/24 0322 291 lb 0.1 oz (132 kg)   12/05/24 0426 282 lb 3 oz (128 kg)       Constitutional: appears well hydrated alert and responsive no acute distress noted  Neck/Thyroid: neck is supple without adenopathy  Lymphatic: no abnormal cervical, supraclavicular or axillary adenopathy is noted  Respiratory: normal to inspection lungs are clear to auscultation bilaterally normal respiratory effort  Cardiovascular: regular rate and rhythm no murmurs, gallups, or rubs  Abdomen: soft non-tender non-distended no organomegaly noted no masses  Musculoskeletal: full ROM all extremities good strength  no deformities  Extremities: 1 + edema.   Neurological: exam appropriate for age reflexes and motor skills appropriate for age    Labs:  Lab Results   Component Value Date    WBC 12.2 12/05/2024    HGB 9.0 12/05/2024    HCT 26.3 12/05/2024    .0 12/05/2024    CREATSERUM 1.61 12/05/2024    BUN 41 12/05/2024     12/05/2024    K 4.0 12/05/2024     12/05/2024    CO2 17.0 12/05/2024     12/05/2024    CA 9.1 12/05/2024    ALB 3.6 12/05/2024    ALKPHO 86 12/05/2024    BILT 0.4 12/05/2024    TP 6.3 12/05/2024    AST 31 12/05/2024    ALT 33 12/05/2024     Recent Labs   Lab 12/04/24  0510 12/04/24  1728 12/05/24  0416   WBC 11.9* 13.4* 12.2*   HGB 7.7* 8.7* 9.0*   HCT 24.2* 26.8* 26.3*   .0 216.0 224.0     Recent Labs   Lab 12/02/24  0434 12/02/24  0636  12/03/24  0312 12/04/24  0510 12/04/24  1728 12/05/24  0416   *  --  130*  130* 128* 118* 120*   BUN  --    < > 33*  33* 42* 44* 41*   CREATSERUM 1.46*  --  1.60*  1.60* 1.65* 1.78* 1.61*   CA 9.1  --  8.9  8.9 9.0 9.2 9.1   ALB 3.6  --  3.4 3.5  --  3.6     --  140  140 139 141 142   K  --    < > 4.5  4.5 4.4 4.5 4.0     --  113*  113* 111 114* 114*   CO2 20.0*  --  19.0*  19.0* 17.0* 17.0* 17.0*   ALKPHO  --   --  86  --   --  86   AST  --   --  25  --   --  31   ALT  --   --  32  --   --  33   BILT  --   --  0.3  --   --  0.4   TP  --   --  6.3  --   --  6.3   PHOS 5.2*  --  5.9* 5.3*  --   --     < > = values in this interval not displayed.       Imaging  XR CHEST AP PORTABLE  (CPT=71045)    Result Date: 12/5/2024  CONCLUSION:   No significant change when compared to 12/04/2024.    Dictated by (CST): Panchito Shahid MD on 12/05/2024 at 8:35 AM     Finalized by (CST): Panchito Shahid MD on 12/05/2024 at 8:38 AM          XR CHEST AP PORTABLE  (CPT=71045)    Result Date: 12/4/2024  CONCLUSION:  1. Mild cardiomegaly and minimally prominent pulmonary vascularity but no huang pulmonary edema.  ET tube and NG tubes have been removed.  Tracheostomy is now noted in the trachea extending to the level the clavicles.  No pneumothorax. 2. Persistent patchy bilateral upper lobe airspace disease right more than left suggesting persistent bilateral upper lobe pneumonia.  Correlate clinically and continued follow-up is advised.    Dictated by (CST): Adalberto Santos MD on 12/04/2024 at 9:39 AM     Finalized by (CST): Adalberto Santos MD on 12/04/2024 at 9:41 AM          XR CHEST AP PORTABLE  (CPT=71045)    Result Date: 12/4/2024  CONCLUSION:  1. Endotracheal and enteric tubes are in stable customary positioning. 2. New right-sided PICC is in customary positioning.  No pneumothorax. 3. Stable patchy airspace opacities in both lungs compatible with multifocal pneumonia.    Dictated by (CST): David  MD Nathanael on 12/04/2024 at 8:01 AM     Finalized by (CST): Nathanael Hernandez MD on 12/04/2024 at 8:05 AM             Medications:    Current Facility-Administered Medications:     niCARdipine (carDENE) 125 mg in sodium chloride 0.9% 250 mL infusion, 5-15 mg/hr, Intravenous, Continuous    ceFAZolin (Ancef) 3 g in sodium chloride 0.9% 100mL IVPB premix, 3 g, Intravenous, On Call to OR    aspirin 300 MG rectal suppository 300 mg, 300 mg, Rectal, Daily    hydrALAzine (Apresoline) 20 mg/mL injection 10 mg, 10 mg, Intravenous, Q6H PRN    metoclopramide (Reglan) 5 mg/mL injection 5 mg, 5 mg, Intravenous, q6h    [Held by provider] docusate (Colace) 50 MG/5ML oral solution 100 mg, 100 mg, Oral, BID    sodium ferric gluconate (Ferrlecit) 125 mg in sodium chloride 0.9% 100mL IVPB premix, 125 mg, Intravenous, Daily    [Held by provider] isosorbide dinitrate (Isordil) tab 40 mg, 40 mg, Per NG Tube, TID (Nitrates)    labetalol (Trandate) 400 mg in sodium chloride 0.9% 200 mL infusion, 1 mg/min, Intravenous, Continuous    [Held by provider] furosemide (Lasix) 10 mg/mL injection 40 mg, 40 mg, Intravenous, Daily    [Held by provider] hydrALAZINE (Apresoline) tab 150 mg, 150 mg, Oral, Q8H GABRIEL    cloNIDine (Catapres) 0.2 MG/24HR patch 1 patch, 1 patch, Transdermal, Weekly    [Held by provider] carvedilol (Coreg) tab 6.25 mg, 6.25 mg, Oral, BID with meals    [Held by provider] amLODIPine (Norvasc) tab 10 mg, 10 mg, Oral, Daily    sodium chloride 3 % nebulizer solution 3 mL, 3 mL, Nebulization, 4 times per day    ipratropium-albuterol (Duoneb) 0.5-2.5 (3) MG/3ML inhalation solution 3 mL, 3 mL, Nebulization, Q6H PRN    albuterol (Ventolin) (2.5 MG/3ML) 0.083% nebulizer solution 2.5 mg, 2.5 mg, Nebulization, 4 times per day    [Held by provider] HYDROcodone-acetaminophen (Norco) 5-325 MG per tab 2 tablet, 2 tablet, Oral, Q4H PRN    heparin (Porcine) 5000 UNIT/ML injection 5,000 Units, 5,000 Units, Subcutaneous, Q8H GABRIEL    melatonin tab  3 mg, 3 mg, Oral, Nightly PRN    bisacodyl (Dulcolax) 10 MG rectal suppository 10 mg, 10 mg, Rectal, Daily PRN    ondansetron (Zofran) 4 MG/2ML injection 4 mg, 4 mg, Intravenous, Q6H PRN    dexmedeTOMIDine in sodium chloride 0.9% (Precedex) 400 mcg/100mL infusion premix, 0.2-1.5 mcg/kg/hr (Dosing Weight), Intravenous, Continuous    norepinephrine (Levophed) 4 mg/250mL infusion premix, 0.5-30 mcg/min, Intravenous, Continuous PRN    potassium chloride 20 mEq/100mL IVPB premix 20 mEq, 20 mEq, Intravenous, PRN **OR** potassium chloride 40 mEq/100mL IVPB premix (central line) 40 mEq, 40 mEq, Intravenous, PRN    magnesium sulfate in dextrose 5% 1 g/100mL infusion premix 1 g, 1 g, Intravenous, PRN    magnesium sulfate in sterile water for injection 2 g/50mL IVPB premix 2 g, 2 g, Intravenous, PRN    chlorhexidine gluconate (Peridex) 0.12 % oral solution 15 mL, 15 mL, Mouth/Throat, BID    morphINE PF 2 MG/ML injection 2 mg, 2 mg, Intravenous, Q2H PRN **OR** [DISCONTINUED] morphINE PF 4 MG/ML injection 4 mg, 4 mg, Intravenous, Q2H PRN    propofol (Diprivan) 10 mg/mL infusion premix, 5-50 mcg/kg/min (Dosing Weight), Intravenous, Continuous    fentaNYL (Sublimaze) 50 mcg/mL injection 25 mcg, 25 mcg, Intravenous, Q3H PRN    [DISCONTINUED] famotidine (Pepcid) tab 20 mg, 20 mg, Oral, Daily **OR** famotidine (Pepcid) 20 mg/2mL injection 20 mg, 20 mg, Intravenous, Daily    Allergies:  Allergies[1]    Input/Output:    Intake/Output Summary (Last 24 hours) at 12/5/2024 1057  Last data filed at 12/5/2024 1000  Gross per 24 hour   Intake 3017.73 ml   Output 4330 ml   Net -1312.27 ml          ASSESSMENT/PLAN:   Assessment   Patient Active Problem List   Diagnosis    Hypercholesteremia    Alcoholism (HCC)    Essential hypertension    Vitamin D deficiency    Adjustment disorder with mixed anxiety and depressed mood    Controlled type 2 diabetes mellitus without complication, without long-term current use of insulin (HCC)    Obesity (BMI  30-39.9)    Neck mass    Polyp of colon    Flat feet, bilateral    Hypokalemia    Myalgia due to statin    Age-related nuclear cataract of both eyes    Positive for microalbuminuria    Dissection of ascending aorta (HCC)    NAIMA (acute kidney injury) (HCC)    Stage 3 chronic kidney disease (HCC)    Acute respiratory failure with hypoxia (HCC)       UO 3980 ml with Lasix.  Creatinine 1.61.  CO2 17.  Hb 9.0.   Will give  repeat dose of Lasix with amp sodium bicarb.  Discussed with RN.            12/5/2024  Juan Franco MD               [1]   Allergies  Allergen Reactions    Atorvastatin MYALGIA    Pravastatin MYALGIA    Rosuvastatin MYALGIA    Seasonal Runny nose

## 2024-12-05 NOTE — PROGRESS NOTES
Pulmonary/ICU/Critical Care Progress Note        Reason for Consultation: post op care  Referring Physician: Dr. Gregg    Seen this am.     SUBJECTIVE:  Afebrile  Opens eyes  Plan for PEG today  On nicardipine for BP control  Received 1 unit pRBC on 12/4      ALLERGIES:  Allergies[1]        MEDS:  Home Medications:  Medications Taking[2]    Scheduled Medication:   aspirin  300 mg Rectal Daily    metoclopramide  5 mg Intravenous q6h    [Held by provider] docusate  100 mg Oral BID    sodium ferric gluconate  125 mg Intravenous Daily    [Held by provider] isosorbide dinitrate  40 mg Per NG Tube TID (Nitrates)    [Held by provider] furosemide  40 mg Intravenous Daily    [Held by provider] hydrALAZINE  150 mg Oral Q8H GABRIEL    cloNIDine  1 patch Transdermal Weekly    [Held by provider] carvedilol  6.25 mg Oral BID with meals    [Held by provider] amLODIPine  10 mg Oral Daily    sodium chloride  3 mL Nebulization 4 times per day    albuterol  2.5 mg Nebulization 4 times per day    heparin  5,000 Units Subcutaneous Q8H GABRIEL    chlorhexidine gluconate  15 mL Mouth/Throat BID    famotidine  20 mg Intravenous Daily     Continuous Infusing Medication:   niCARdipine 15 mg/hr (12/05/24 0600)    labetalol (Trandate) 400 mg in sodium chloride 0.9% 200 mL infusion 2 mg/min (12/04/24 1445)    dexmedetomidine 1.1 mcg/kg/hr (12/05/24 0818)    norepinephrine Stopped (11/23/24 2225)    propofol 35 mcg/kg/min (12/05/24 0342)     PRN Medications:    hydrALAzine    ipratropium-albuterol    [Held by provider] HYDROcodone-acetaminophen    melatonin    bisacodyl    ondansetron    norepinephrine    potassium chloride **OR** potassium chloride    magnesium sulfate in dextrose 5%    magnesium sulfate in sterile water for injection    morphINE **OR** [DISCONTINUED] morphINE    fentaNYL       PHYSICAL EXAM:  /66   Pulse 75   Temp 98.1 °F (36.7 °C) (Axillary)   Resp 19   Ht 5' 5\" (1.651 m)   Wt 282 lb 3 oz (128 kg)   SpO2 100%   BMI 46.96  kg/m²   Vent Mode: VC+  FiO2 (%):  [30 %-40 %] 30 %  S RR:  [18] 18  S VT:  [550 mL] 550 mL  PEEP/CPAP (cm H2O):  [5 cm H20] 5 cm H20  MAP (cm H2O):  [12-15] 15    CONSTITUTIONAL: intubated, sedated but opens eyes  HEENT: atraumatic normocephalic  MOUTH: MMM, large tongue  NECK/THROAT: no JVD. Trachea midline. No obvious thyromegaly. + trach with min bleeding   LUNG: vented upper clear BS b/l no wheezing, + crackles. Diminished at bases. Chest symmetric with respiratory motion. + midsternal surgical wound healing   HEART: regular rate and rhythm, no obvious murmers or gallops noted  ABD: soft non tender. + bowel sounds. No organomegaly noted  EXT: no clubbing, cyanosis, or edema noted. Pulses intact grossly  NEURO/MUSCULOSKELETAL: intubated sedated   SKIN: warm, dry. No obvious lesions noted  LYMPH: no obvious LAD      IMAGES:   CXR 12/4/24  CONCLUSION:   1. Mild cardiomegaly and minimally prominent pulmonary vascularity but no huang pulmonary edema.  ET tube and NG tubes have been removed.  Tracheostomy is now noted in the trachea extending to the level the clavicles.  No pneumothorax.   2. Persistent patchy bilateral upper lobe airspace disease right more than left suggesting persistent bilateral upper lobe pneumonia.  Correlate clinically and continued follow-up is advised.     CXR 12/2/24  FINDINGS:   POSITION: The patient is semi-erect and slightly rotated to the right.   DEVICES: There is an endotracheal tube terminating approximately 3.9 cm above the jennyfer.  A large-bore right internal jugular central venous vascular access sheath has tip projecting in the SVC. An enteric tube extends caudally beyond the field of view.   CARDIAC/VASC: The cardiomediastinal silhouette is accentuated by AP technique, but likely stably enlarged. There is mild tortuosity of the thoracic aorta with peripheral atherosclerotic vascular calcification. The pulmonary vascularity is within normal   limits.   MEDIAST/HAMLET: Surgical  clips are present.   LUNGS/PLEURA: Elevation right hemidiaphragm is noted. Multifocal patchy airspace consolidation is demonstrated, slightly worse on the right side than left. Mild interstitial opacities are seen. Additional scattered reticular opacities may be atelectatic   in nature. No large pleural effusion or pneumothorax is evident.    BONES: Median sternotomy wires are demonstrated. Mild degenerative changes of the thoracic spine are apparent. There is no fracture or visible bony lesion.   OTHER: There may be surgical clips at the level of the thoracic inlet. Leads overlie the chest and obscure underlying detail     CXR 11/27/24  Moderate pulmonary edema, progressed since 11/26/2024.     CT c/a/p  CONCLUSION:  Low-lying ETT, 0.8 cm above the jennyfer   Multifocal bilateral pulmonary nodular consolidation s    CXR 11/24/24  FINDINGS:   CARDIAC/VASC: The cardiac silhouette is exaggerated by AP portable technique. There is stable central pulmonary venous congestion.   MEDIAST/HAMLET:   No visible mass or adenopathy.   LUNGS/PLEURA: There are stable streaky opacities at the right lung base.  No pleural effusion or pneumothorax.   BONES: Sternotomy changes are again noted.   OTHER: An endotracheal tube tip projects 3.8 cm above the jennyfer.  An enteric tube again courses into the left upper quadrant.  A right internal jugular approach [place]-Dev catheter tip again projects over the pulmonary outflow tract.  A mediastinal drain tip again projects over the right suprahilar region.     CXR 11/23/24  On my read possible RML infiltrates  CONCLUSION: Post emergent acute type A aortic dissection repair.  Stable cardiomegaly.  Gross stable/satisfactory position of lines and tubes.  Mild pulmonary vascular congestion.       CXR 11/22/24  CONCLUSION: Post feeding tube placement with tip in the distal esophagus approximately 4 cm above the gastroesophageal junction.  Recommend advancement of the tube by approximately 10 cm to  place it in the stomach.     CXR 11/22/24  CARDIAC/MEDIASTINUM: The cardiac silhouette is enlarged and unchanged.. There is a right internal jugular [place]-Dev catheter with tip at the level of the main pulmonary artery. There is a right-sided chest tube. Endotracheal tube with tip approximately    5.3 cm above the jennyfer. There is a nasogastric tube with tip and sidehole within the stomach and below the diaphragm. There are median sternotomy wires and postoperative changes of ascending aortic repair.   LUNGS: There is pulmonary vascular redistribution without edema. Minimal blunting of the bilateral costophrenic angles may be secondary to trace pleural effusions versus chronic pleural thickening. There is mild bibasilar atelectasis. No pneumothorax.   BONES: There is degenerative disease of the thoracic spine.     Chest CTA 11/22/24  CONCLUSION:   1. Type a aortic dissection beginning just above right renal (correction:  Right coronary)  artery and extending distally into the left common iliac artery and external iliac.  Findings were called to Dr. Echavarria at 5:52 p.m.       LABS:  Recent Labs   Lab 12/02/24  0432 12/03/24  0312 12/04/24  0510 12/04/24  1728 12/05/24  0416   RBC 3.09* 3.02* 2.89* 3.16* 3.17*   HGB 8.3* 8.0* 7.7* 8.7* 9.0*   HCT 25.1* 25.3* 24.2* 26.8* 26.3*   MCV 81.2 83.8 83.7 84.8 83.0   MCH 26.9 26.5 26.6 27.5 28.4   MCHC 33.1 31.6 31.8 32.5 34.2   RDW 16.3* 16.5* 16.6* 16.9* 16.5*   NEPRELIM 7.08 7.99* 8.52*  --   --    WBC 10.4 11.5* 11.9* 13.4* 12.2*   .0 194.0 238.0 216.0 224.0       Recent Labs   Lab 12/03/24  0312 12/04/24  0510 12/04/24  1728 12/05/24  0416   *  130* 128* 118* 120*   BUN 33*  33* 42* 44* 41*   CREATSERUM 1.60*  1.60* 1.65* 1.78* 1.61*   EGFRCR 36*  36* 35* 32* 36*   CA 8.9  8.9 9.0 9.2 9.1   ALB 3.4 3.5  --  3.6     140 139 141 142   K 4.5  4.5 4.4 4.5 4.0   *  113* 111 114* 114*   CO2 19.0*  19.0* 17.0* 17.0* 17.0*   ALKPHO 86  --   --   86   AST 25  --   --  31   ALT 32  --   --  33   BILT 0.3  --   --  0.4   TP 6.3  --   --  6.3       ASSESSMENT/PLAN:  Type A aortic dissection s/p repair now intubated in ICU  -on nicardipine gtt as per CV surgery  -oral antiHTN meds once have PEG  and ok to use by GI  -s/p pRBC transfusion on 12/4 with repeat hg stable    Post op acute respiratory failure  -complicated by extubation and reimergent intubation and significant emesis concerning for aspiration PNA vs pneumonitis  -started on zosyn-completed 10 day course  -checked ETT asp-candida likely contaminant  -failed multiple SBTs d/t increased RR, work of breathing, hypertension, hypoxemia, significant thick secretions  -hx of likely TANYA and previous smoking hx. Has sign dense consolidations and atelectasis on chest CT.  -s/p trach by ENT on 12/4/24 with plans for PEG by GI on 12/5/24  -start trach/vent weaning this morning   -PRN ABG and CXR  -saline nebs    Previous hx of smoking and ETOH  -continue duonebs PRN    NAIMA on CKD and metabolic acidosis  -likely from surgery, and acute issues  -monitor UO and renal labs  -renal following     Abd pain  -s/p abd CT as above  -resolved    Proph:  -DVT: hep sq    Dispo:  -full code  -SW/ to assist with LTAC placement    Critical care time 30 min independent of procedures      Thank you for the opportunity to care for Alisha Wilde.      SIDDHARTH Rainey DO, MPH  Pulmonary Critical Care Medicine  [facility]                       [1]   Allergies  Allergen Reactions    Atorvastatin MYALGIA    Pravastatin MYALGIA    Rosuvastatin MYALGIA    Seasonal Runny nose   [2]   Outpatient Medications Marked as Taking for the 11/21/24 encounter (Hospital Encounter)   Medication Sig Dispense Refill    Acetaminophen ER (TYLENOL 8 HOUR) 650 MG Oral Tab CR Take 2 tablets (1,300 mg total) by mouth daily as needed.      Calcium Carb-Cholecalciferol 600-10 MG-MCG Oral Tab Take 1 tablet  by mouth daily.      metoprolol succinate ER 50 MG Oral Tablet 24 Hr Take 1 tablet (50 mg total) by mouth daily. 90 tablet 3    Losartan Potassium-HCTZ 100-12.5 MG Oral Tab Take 1 tablet by mouth daily. 90 tablet 3    Potassium Chloride ER 20 MEQ Oral Tab CR Take 1 tablet by mouth daily. 90 tablet 3    albuterol 108 (90 Base) MCG/ACT Inhalation Aero Soln Inhale 2 puffs into the lungs every 4 (four) hours as needed for Wheezing. 3 each 3    amLODIPine 10 MG Oral Tab Take 1 tablet (10 mg total) by mouth daily. 90 tablet 3    Ascorbic Acid (VITAMIN C) 1000 MG Oral Tab Take 1 tablet (1,000 mg total) by mouth daily.      vitamin B-12 50 MCG Oral Tab Take 1 tablet (50 mcg total) by mouth daily.

## 2024-12-05 NOTE — PROGRESS NOTES
[facility]  part of [facility]    Cardiology Progress Note    Ailsha Wilde Patient Status:  Inpatient    10/25/1965 MRN [id number]   Location [facility] 2W/SW Attending Aguila Villegas MD   Hosp Day # 13 PCP Bridger Avendano MD     Interval History   Status post tracheostomy, plan for PEG tube placement today  currently on IV nicardipine gtt with adequate control of BP    Assessment and Plan:   Acute hypoxic respiratory failure, c/b aspiration event requiring re-intubation  Type A aortic dissection  NAIMA on CKD-III, improved  Obesity  EtOH abuse  -presented with acute onset CP   -CT with type A aortic dissection above right coronary artery and extending distally into the left common iliac artery and external iliac   -s/p emergent successful repair on 24  -had aspiration event following self-extubation, re-intubated and now s/p trach and plan for PEG tube    Hypertension  --TTE on  with hyperdynamic LVEF  -continue BP management: holding all PO meds due to recent trach and plan for PEG tube   -resume amlodipine, combination hydralazine + isosorbide and coreg   -continue clonidine 0.2mg/hr patch    -wean nicardipine gtt    -ARB held by nephrology due to NAIMA/CKD  -volume management per nephrology, on lasix  -monitor rhythm on tele    Objective:   Patient Vitals for the past 24 hrs:   BP Temp Temp src Pulse Resp SpO2 Weight   24 1500 119/66 -- -- 70 18 92 % --   24 1400 124/66 -- -- 71 18 95 % --   24 1300 122/66 -- -- 68 18 97 % --   24 1200 124/68 98.4 °F (36.9 °C) Axillary 68 18 97 % --   24 1100 127/75 -- -- 69 18 98 % --   24 1000 129/71 -- -- 69 18 97 % --   24 0900 128/69 -- -- 70 18 97 % --   24 0800 (!) 143/118 97.7 °F (36.5 °C) Temporal 70 18 98 % --   24 0700 130/70 -- -- 69 18 99 % --   24 -- -- -- -- -- -- 282 lb 3 oz (128 kg)   24 0600 95/53 -- -- 68 21 97 % --   24 0500 121/67 -- --  70 18 97 % --   11/26/24 0400 119/68 98.4 °F (36.9 °C) Temporal 72 18 98 % --   11/26/24 0200 109/60 -- -- 72 21 96 % --   11/26/24 0130 -- -- -- 72 18 97 % --   11/26/24 0100 142/73 -- -- 71 18 98 % --   11/26/24 0030 -- -- -- 71 18 99 % --   11/26/24 0000 139/70 98.3 °F (36.8 °C) Temporal 70 18 98 % --   11/25/24 2300 130/69 -- -- 72 18 98 % --   11/25/24 2200 135/70 -- -- 71 18 98 % --   11/25/24 2100 138/71 -- -- 71 18 99 % --   11/25/24 2000 132/68 98.1 °F (36.7 °C) Temporal 69 18 99 % --   11/25/24 1900 124/64 -- -- 71 18 98 % --   11/25/24 1800 136/72 -- -- 71 18 98 % --   11/25/24 1700 138/70 -- -- 70 18 98 % --       Intake/Output:   Last 3 shifts:   Intake/Output                   11/24/24 0700 - 11/25/24 0659 11/25/24 0700 - 11/26/24 0659 11/26/24 0700 - 11/27/24 0659       Intake    P.O.  0  0  --    P.O. 0 0 --    I.V.  2276.7  1884.9  --    I.V. 154 615 --    Volume (mL) Insulin 53.9 37 --    Volume (mL) Nitroglycerin 236.9 54.5 --    Volume (mL) Dobutamine 4.5 -- --    Volume (mL) Propofol 477.8 713.9 --    Volume (mL) Dexmedetomidine 352.2 384.5 --    Volume (mL) (dextrose 5%-sodium chloride 0.45% infusion) 997.4 80 --    NG/GT  50  150  60    Intake (mL) (NG/OG Tube Orogastric 16 Fr. Right mouth) 50 150 60    IV PIGGYBACK  600  500  --    Volume (mL) (piperacillin-tazobactam (Zosyn) 3.375 g in dextrose 5% 100 mL IVPB-ADDV) 300 200 --    Volume (mL) (acetaminophen (Ofirmev) 10 mg/mL infusion premix 1,000 mg) 200 100 --    Volume (mL) (potassium chloride 20 mEq/100mL IVPB premix 20 mEq) -- 100 --    Volume (mL) (potassium chloride 40 mEq/100mL IVPB premix (central line) 40 mEq) 100 100 --    Total Intake 2926.7 2534.9 60       Output    Urine  1800  3570  800    Output (mL) (Urethral Catheter Latex;Temperature probe;Double-lumen) 1800 3570 800    Emesis/NG output  550  365  --    Residual volume (ml) (NG/OG Tube Orogastric 16 Fr. Right mouth) -- 5 --    Output (mL) (NG/OG Tube Orogastric 16 Fr. Right  mouth) 550 360 --    Chest Tube  188  125  30    Output (mL) ([REMOVED] Chest Tube Anterior Mediastinal 32 Fr.) 48 50 --    Output (mL) (Chest Tube Anterior Pericardial 24 Fr.) 140 75 30    Total Output 2538 4060 830       Net I/O     388.7 -1525.1 -770             Vent Settings: Vent Mode: VC+  FiO2 (%):  [30 %-40 %] 30 %  S RR:  [18] 18  S VT:  [550 mL] 550 mL  PEEP/CPAP (cm H2O):  [5 cm H20] 5 cm H20  MAP (cm H2O):  [13-15] 14    Hemodynamic parameters (last 24 hours):      Scheduled Meds:    senna  5 mL Per G Tube BID    aspirin  300 mg Rectal Daily    metoclopramide  5 mg Intravenous q6h    docusate  100 mg Oral BID    sodium ferric gluconate  125 mg Intravenous Daily    isosorbide dinitrate  40 mg Per NG Tube TID (Nitrates)    [Held by provider] furosemide  40 mg Intravenous Daily    hydrALAZINE  150 mg Oral Q8H [facility]    cloNIDine  1 patch Transdermal Weekly    carvedilol  6.25 mg Oral BID with meals    amLODIPine  10 mg Oral Daily    sodium chloride  3 mL Nebulization 4 times per day    albuterol  2.5 mg Nebulization 4 times per day    heparin  5,000 Units Subcutaneous Q8H GABRIEL    chlorhexidine gluconate  15 mL Mouth/Throat BID    famotidine  20 mg Intravenous Daily       Continuous Infusions:    niCARdipine 12 mg/hr (12/05/24 1156)    labetalol (Trandate) 400 mg in sodium chloride 0.9% 200 mL infusion 2 mg/min (12/04/24 1445)    dexmedetomidine 0.8 mcg/kg/hr (12/05/24 1300)    norepinephrine Stopped (11/23/24 2225)    propofol 35 mcg/kg/min (12/05/24 1027)       Results:     Lab Results   Component Value Date    WBC 12.2 (H) 12/05/2024    HGB 9.0 (L) 12/05/2024    HCT 26.3 (L) 12/05/2024    .0 12/05/2024    CREATSERUM 1.61 (H) 12/05/2024    BUN 41 (H) 12/05/2024     12/05/2024    K 4.0 12/05/2024     (H) 12/05/2024    CO2 17.0 (L) 12/05/2024     (H) 12/05/2024    CA 9.1 12/05/2024    ALB 3.6 12/05/2024    ALKPHO 86 12/05/2024    BILT 0.4 12/05/2024    TP 6.3 12/05/2024    AST 31  12/05/2024    ALT 33 12/05/2024    PTT 30.7 12/04/2024    INR 1.17 12/04/2024    TSH 0.704 09/29/2024    NATHALIE 99 11/25/2024    LIP 28 11/25/2024    DDIMER 0.42 12/08/2019    ESRML 15 06/05/2021    MG 2.5 12/04/2024    PHOS 5.3 (H) 12/04/2024    TROP <0.045 12/08/2019     (H) 09/23/2021    B12 1,156 (H) 07/23/2018       Recent Labs   Lab 12/04/24  0510 12/04/24  1728 12/05/24  0416   * 118* 120*   BUN 42* 44* 41*   CREATSERUM 1.65* 1.78* 1.61*   CA 9.0 9.2 9.1    141 142   K 4.4 4.5 4.0    114* 114*   CO2 17.0* 17.0* 17.0*     Recent Labs   Lab 12/02/24  0432 12/03/24  0312 12/04/24  0510 12/04/24  1728 12/05/24  0416   RBC 3.09* 3.02* 2.89* 3.16* 3.17*   HGB 8.3* 8.0* 7.7* 8.7* 9.0*   HCT 25.1* 25.3* 24.2* 26.8* 26.3*   MCV 81.2 83.8 83.7 84.8 83.0   MCH 26.9 26.5 26.6 27.5 28.4   MCHC 33.1 31.6 31.8 32.5 34.2   RDW 16.3* 16.5* 16.6* 16.9* 16.5*   NEPRELIM 7.08 7.99* 8.52*  --   --    WBC 10.4 11.5* 11.9* 13.4* 12.2*   .0 194.0 238.0 216.0 224.0       No results for input(s): \"BNPML\" in the last 168 hours.    No results for input(s): \"TROP\", \"CK\" in the last 168 hours.    XR CHEST AP PORTABLE  (CPT=71045)    Result Date: 12/5/2024  CONCLUSION:   No significant change when compared to 12/04/2024.    Dictated by (CST): Panchito Shahid MD on 12/05/2024 at 8:35 AM     Finalized by (CST): Panchito Shahid MD on 12/05/2024 at 8:38 AM          XR CHEST AP PORTABLE  (CPT=71045)    Result Date: 12/4/2024  CONCLUSION:  1. Mild cardiomegaly and minimally prominent pulmonary vascularity but no huang pulmonary edema.  ET tube and NG tubes have been removed.  Tracheostomy is now noted in the trachea extending to the level the clavicles.  No pneumothorax. 2. Persistent patchy bilateral upper lobe airspace disease right more than left suggesting persistent bilateral upper lobe pneumonia.  Correlate clinically and continued follow-up is advised.    Dictated by (CST): Adalberto Santos MD on 12/04/2024 at  9:39 AM     Finalized by (CST): Adalberto Santos MD on 12/04/2024 at 9:41 AM          XR CHEST AP PORTABLE  (CPT=71045)    Result Date: 12/4/2024  CONCLUSION:  1. Endotracheal and enteric tubes are in stable customary positioning. 2. New right-sided PICC is in customary positioning.  No pneumothorax. 3. Stable patchy airspace opacities in both lungs compatible with multifocal pneumonia.    Dictated by (CST): Nathanael Hernandez MD on 12/04/2024 at 8:01 AM     Finalized by (CST): Nathanael Hernandez MD on 12/04/2024 at 8:05 AM                    Exam:     Physical Exam:  General: awake/alert  HEENT: +trach  Neck: No JVD, carotids 2+, no bruits.  Cardiac: Regular rate and rhythm. S1, S2 normal.   Lungs: Clear without wheezes, rales, rhonchi or dullness.    Abdomen: Soft, non-tender. No organosplenomegally, mass or rebound, BS-present.  Extremities: Without clubbing or cyanosis. + edema.    Neurologic: unable to obtain  Skin: Warm and dry.     Edward Montgomery, [facility] [place]

## 2024-12-05 NOTE — OPERATIVE REPORT
ESOPHAGOGASTRODUODENOSCOPY (EGD) WITH PERCUTANEOUS ENDOSCOPIC GASTROSTOMY PLACEMENT (PEG)    Alisha Wilde     10/25/1965 Age 59 year old   PCP Bridger Avendano MD Endoscopist Toma Barrientos MD     Date of procedure: 24    Procedure: EGD w/PEG    Pre-operative diagnosis: respiratory failure, dysphagia    Post-operative diagnosis: see impression    Medications:  per ICU    Pre-procedural antibiotics: ancef     Complications: none    Procedure:  Informed consent was obtained from the patient after the risks of the procedure were discussed, including but not limited to bleeding, perforation, aspiration, infection, possibility of a missed lesion or death. After discussions of the risks/benefits and alternatives to this procedure, as well as the planned sedation, the patient was placed in the left lateral decubitus position and begun on continuous blood pressure pulse oximetry and EKG monitoring and this was maintained throughout the procedure. Once an adequate level of sedation was obtained a bite block was placed. Then the lubricated tip of the [id number] diagnostic video upper endoscope was inserted and advanced using direct visualization into the posterior pharynx and ultimately into the esophagus.    Percutaneous endoscopic gastrostomy: In a darkened room, the abdominal wall was transilluminated and a site was selected. Indentation of the gastric wall by external finger pressure was demonstrated in a circular fashion. The skin was cleansed with chlorhexadine and betadine and anesthetized with xylocaine with a finder needle. The finder needle was visualized endoscopically and simultaneously bubbles were noted in the aspirating syringe. A small 10mm incision was made in the abdominal wall using a surgical blade. A 25-gauge needle with cannula was inserted through the abdominal wall and into the gastric lumen. A guidewire was passed through the cannula, was caught by the snare passed through the endoscope and  brought out through the mouth. A PEG tube was inserted over the guidewire and advanced through the abdominal wall. The PEG tube was noted at 3.5 cm at the skin with the tube pulled taught. A satisfactory final position was confirmed endoscopically. The gastrostomy tube was secured with the outer flange positioned which was positioned at 4 cm.    Complications: None    Findings:    1. Esophagus: normal esophagus    2. Stomach: The stomach distended normally. Normal rugal folds were seen. The pylorus was patent. The gastric mucosa appeared normal. Retroflexion revealed a normal fundus and a non-patulous cardia.     3. Duodenum: The duodenal mucosa appeared normal in the 1st and 2nd portion of the duodenum.     Impression:  -Successful placement of a 20F Gastrostomy tube.     Recommend:  -Diet: NPO for 24 hours, will adjust external bumper tomorrow and reassess to start tube-feeds.   -Okay to use G-tube immediately for medications/flushes. To ensure patency, flush the tube with 100ml of water q 8 hours.   -Monitor for signs of GI bleeding.    Specimens:  None    EBL: < 1 ml

## 2024-12-05 NOTE — PROGRESS NOTES
[facility]  part of [facility]    Progress Note    Alisha Wilde Patient Status:  Inpatient    10/25/1965 MRN [id number]   Location [facility] 2W/SW Attending Missy Paulson MD   Hosp Day # 13 PCP Bridger Avendano MD     Subjective:  Pt sedated on full vent support. No visitors at bedside.     Objective:  /63   Pulse 79   Temp 97.4 °F (36.3 °C)   Resp 21   Ht 5' 5\" (1.651 m)   Wt 282 lb 3 oz (128 kg)   SpO2 100%   BMI 46.96 kg/m²     Temp (24hrs), Av.8 °F (36.6 °C), Min:97.4 °F (36.3 °C), Max:98.2 °F (36.8 °C)      Intake/Output:    Intake/Output Summary (Last 24 hours) at 2024 0803  Last data filed at 2024 0600  Gross per 24 hour   Intake 2644.13 ml   Output 3980 ml   Net -1335.87 ml       Wt Readings from Last 3 Encounters:   24 282 lb 3 oz (128 kg)   10/24/24 254 lb (115.2 kg)   24 249 lb (112.9 kg)       Allergies:  Allergies[1]    Labs:  Lab Results   Component Value Date    WBC 12.2 2024    HGB 9.0 2024    HCT 26.3 2024    .0 2024    CREATSERUM 1.61 2024    BUN 41 2024     2024    K 4.0 2024     2024    CO2 17.0 2024     2024    CA 9.1 2024    ALB 3.6 2024    ALKPHO 86 2024    BILT 0.4 2024    TP 6.3 2024    AST 31 2024    ALT 33 2024       Physical Exam:  Blood pressure 148/63, pulse 79, temperature 97.4 °F (36.3 °C), resp. rate 21, height 5' 5\" (1.651 m), weight 282 lb 3 oz (128 kg), SpO2 100%, not currently breastfeeding.  General: large tongue  Neck: trach in place  Lungs: Coarse bilaterally anteriorly/laterally   Heart: RRR, S1, S2  Abdomen: Soft/Obese, NT/ND, BS+x 4/+BM(s) post op  Extremities: Warm, dry, generalized UE & LE edema bilat  Pulses: 2+ bilat DP  Skin: sternotomy incision CATHIE=CDI-healing well   Neurological:  sedated on Propofol & Precedex     Assessment/Plan:  S/P EMERGENT AORTIC DISSECTION  REPAIR POD # 13  Respiratory Failure w/multiple failed SBT prior to trach placement -currently on full vent support. Re-intubated on 11/22 per Pulm after emesis episode/ETT removal due to emesis content noted in airway.    S/P Trach placement per ENT on 12/4  Completed ABX course for suspected aspiration pneumonia.   CT Chest/Abdomen/Pelvis (non-contrast) 11/26=no obstruction noted/stable. +BM (s) post op  Hx: HTN: on multiple antihypertensives including IV Cardene: SBP goal= <130/MAP >70. Mgt per Cards. New A-line placed 12/2  New PICC placed 12/2  Pain meds as needed  Scds/Heparin SubQ prophylaxis DVT prevention. HIPA 11/25=negative. Plts continue >100,000  Continue ICU status  Hx: CKD. Expected post op volume overload w/mgt per Nephrology (consulted on 11/23) for NAIMA. Limit/avoid nephrotoxic meds: Lasix PRN per Nephrology. Webb remains for close I/O monitoring & immobility.  Expected post op anemia: w/o clinical significance/stable. May be slightly diluted due to volume overload. Received one unit PRBCs yesterday.  Hx: Morbid obesity w/BMI >40-plan for RD to see when appropriate  Nutrition on hold for PEG placement today per GI. OGT removed after Trach yesterday.   No BM past couple days- continue Reglan and Colace will add Senna once PEG placed and able to give. Abdominal X-ray 12/4=gas  Hx: ETOH abuse-monitor for withdrawals. CIWA protocol PRN   Discharge planning: pt lives alone and has supportive family.   Anticipate LTAC at discharge: referrals sent proactively by  on 11/29 for LTAC.    Plan of care discussed w/RN  Mariela Noel, APRN  12/5/2024  8:03 AM         [1]   Allergies  Allergen Reactions    Atorvastatin MYALGIA    Pravastatin MYALGIA    Rosuvastatin MYALGIA    Seasonal Runny nose

## 2024-12-05 NOTE — PLAN OF CARE
Problem: RESPIRATORY - ADULT  Goal: Achieves optimal ventilation and oxygenation  Description: INTERVENTIONS:  - Assess for changes in respiratory status  - Assess for changes in mentation and behavior  - Position to facilitate oxygenation and minimize respiratory effort  - Oxygen supplementation based on oxygen saturation or ABGs  - Provide Smoking Cessation handout, if applicable  - Encourage broncho-pulmonary hygiene including cough, deep breathe, Incentive Spirometry  - Assess the need for suctioning and perform as needed  - Assess and instruct to report SOB or any respiratory difficulty  - Respiratory Therapy support as indicated  - Manage/alleviate anxiety  - Monitor for signs/symptoms of CO2 retention  Outcome: Progressing   Patient received with tracheostomy and on ventilator VC+ 18/550/+5/30%. Tracheostomy care provided. Bilateral breath sounds auscultated. Suction provided when indicated. Nebulized medications given as ordered. Patient was placed on PS trial at 1305 10/5 30%. After ~30 minutes patient's BP and WOB were increasing and patient was returned to full support. No acute events. RT will continue to monitor.

## 2024-12-05 NOTE — PLAN OF CARE
Problem: Patient Centered Care  Goal: Patient preferences are identified and integrated in the patient's plan of care  Description: Interventions:  - What would you like us to know as we care for you? I work at [employer] and I am still very good friends with people from grade school! My brother, Osbaldo Prince, has been making decisions for me.  - Provide timely, complete, and accurate information to patient/family  - Incorporate patient and family knowledge, values, beliefs, and cultural backgrounds into the planning and delivery of care  - Encourage patient/family to participate in care and decision-making at the level they choose  - Honor patient and family perspectives and choices  Outcome: Progressing     Problem: Diabetes/Glucose Control  Goal: Glucose maintained within prescribed range  Description: INTERVENTIONS:  - Monitor Blood Glucose as ordered  - Assess for signs and symptoms of hyperglycemia and hypoglycemia  - Administer ordered medications to maintain glucose within target range  - Assess barriers to adequate nutritional intake and initiate nutrition consult as needed  - Instruct patient on self management of diabetes  Outcome: Progressing     Problem: Patient/Family Goals  Goal: Patient/Family Long Term Goal  Description: Patient's Long Term Goal: To discharge from the hospital safely    Interventions:  - surgical interventions, rounding, medications  - See additional Care Plan goals for specific interventions  Outcome: Progressing  Goal: Patient/Family Short Term Goal  Description: Patient's Short Term Goal: to breathe better    Interventions:   - manage the ventilator for pt until she is able to breath on her own.  - See additional Care Plan goals for specific interventions  Outcome: Progressing     Problem: CARDIOVASCULAR - ADULT  Goal: Maintains optimal cardiac output and hemodynamic stability  Description: INTERVENTIONS:  - Monitor vital signs, rhythm, and trends  - Monitor for  bleeding, hypotension and signs of decreased cardiac output  - Evaluate effectiveness of vasoactive medications to optimize hemodynamic stability  - Monitor arterial and/or venous puncture sites for bleeding and/or hematoma  - Assess quality of pulses, skin color and temperature  - Assess for signs of decreased coronary artery perfusion - ex. Angina  - Evaluate fluid balance, assess for edema, trend weights  Outcome: Progressing  Goal: Absence of cardiac arrhythmias or at baseline  Description: INTERVENTIONS:  - Continuous cardiac monitoring, monitor vital signs, obtain 12 lead EKG if indicated  - Evaluate effectiveness of antiarrhythmic and heart rate control medications as ordered  - Initiate emergency measures for life threatening arrhythmias  - Monitor electrolytes and administer replacement therapy as ordered  Outcome: Progressing     Problem: RESPIRATORY - ADULT  Goal: Achieves optimal ventilation and oxygenation  Description: INTERVENTIONS:  - Assess for changes in respiratory status  - Assess for changes in mentation and behavior  - Position to facilitate oxygenation and minimize respiratory effort  - Oxygen supplementation based on oxygen saturation or ABGs  - Provide Smoking Cessation handout, if applicable  - Encourage broncho-pulmonary hygiene including cough, deep breathe, Incentive Spirometry  - Assess the need for suctioning and perform as needed  - Assess and instruct to report SOB or any respiratory difficulty  - Respiratory Therapy support as indicated  - Manage/alleviate anxiety  - Monitor for signs/symptoms of CO2 retention  Outcome: Progressing     Problem: GASTROINTESTINAL - ADULT  Goal: Minimal or absence of nausea and vomiting  Description: INTERVENTIONS:  - Maintain adequate hydration with IV or PO as ordered and tolerated  - Nasogastric tube to low intermittent suction as ordered  - Evaluate effectiveness of ordered antiemetic medications  - Provide nonpharmacologic comfort measures as  appropriate  - Advance diet as tolerated, if ordered  - Obtain nutritional consult as needed  - Evaluate fluid balance  Outcome: Progressing  Goal: Maintains or returns to baseline bowel function  Description: INTERVENTIONS:  - Assess bowel function  - Maintain adequate hydration with IV or PO as ordered and tolerated  - Evaluate effectiveness of GI medications  - Encourage mobilization and activity  - Obtain nutritional consult as needed  - Establish a toileting routine/schedule  - Consider collaborating with pharmacy to review patient's medication profile  Outcome: Progressing     Problem: METABOLIC/FLUID AND ELECTROLYTES - ADULT  Goal: Glucose maintained within prescribed range  Description: INTERVENTIONS:  - Monitor Blood Glucose as ordered  - Assess for signs and symptoms of hyperglycemia and hypoglycemia  - Administer ordered medications to maintain glucose within target range  - Assess barriers to adequate nutritional intake and initiate nutrition consult as needed  - Instruct patient on self management of diabetes  Outcome: Progressing  Goal: Electrolytes maintained within normal limits  Description: INTERVENTIONS:  - Monitor labs and rhythm and assess patient for signs and symptoms of electrolyte imbalances  - Administer electrolyte replacement as ordered  - Monitor response to electrolyte replacements, including rhythm and repeat lab results as appropriate  - Fluid restriction as ordered  - Instruct patient on fluid and nutrition restrictions as appropriate  Outcome: Progressing  Goal: Hemodynamic stability and optimal renal function maintained  Description: INTERVENTIONS:  - Monitor labs and assess for signs and symptoms of volume excess or deficit  - Monitor intake, output and patient weight  - Monitor urine specific gravity, serum osmolarity and serum sodium as indicated or ordered  - Monitor response to interventions for patient's volume status, including labs, urine output, blood pressure (other  measures as available)  - Encourage oral intake as appropriate  - Instruct patient on fluid and nutrition restrictions as appropriate  Outcome: Progressing     Problem: SKIN/TISSUE INTEGRITY - ADULT  Goal: Skin integrity remains intact  Description: INTERVENTIONS  - Assess and document risk factors for pressure ulcer development  - Assess and document skin integrity  - Monitor for areas of redness and/or skin breakdown  - Initiate interventions, skin care algorithm/standards of care as needed  Outcome: Progressing  Goal: Incision(s), wounds(s) or drain site(s) healing without S/S of infection  Description: INTERVENTIONS:  - Assess and document risk factors for pressure ulcer development  - Assess and document skin integrity  - Assess and document dressing/incision, wound bed, drain sites and surrounding tissue  - Implement wound care per orders  - Initiate isolation precautions as appropriate  - Initiate Pressure Ulcer prevention bundle as indicated  Outcome: Progressing  Goal: Oral mucous membranes remain intact  Description: INTERVENTIONS  - Assess oral mucosa and hygiene practices  - Implement preventative oral hygiene regimen  - Implement oral medicated treatments as ordered  Outcome: Progressing     Problem: HEMATOLOGIC - ADULT  Goal: Maintains hematologic stability  Description: INTERVENTIONS  - Assess for signs and symptoms of bleeding or hemorrhage  - Monitor labs and vital signs for trends  - Administer supportive blood products/factors, fluids and medications as ordered and appropriate  - Administer supportive blood products/factors as ordered and appropriate  Outcome: Progressing     Problem: MUSCULOSKELETAL - ADULT  Goal: Return mobility to safest level of function  Description: INTERVENTIONS:  - Assess patient stability and activity tolerance for standing, transferring and ambulating w/ or w/o assistive devices  - Assist with transfers and ambulation using safe patient handling equipment as needed  -  Ensure adequate protection for wounds/incisions during mobilization  - Obtain PT/OT consults as needed  - Advance activity as appropriate  - Communicate ordered activity level and limitations with patient/family  Outcome: Progressing  Goal: Maintain proper alignment of affected body part  Description: INTERVENTIONS:  - Support and protect limb and body alignment per provider's orders  - Instruct and reinforce with patient and family use of appropriate assistive device and precautions (e.g. spinal or hip dislocation precautions)  Outcome: Progressing     Problem: NEUROLOGICAL - ADULT  Goal: Achieves stable or improved neurological status  Description: INTERVENTIONS  - Assess for and report changes in neurological status  - Initiate measures to prevent increased intracranial pressure  - Maintain blood pressure and fluid volume within ordered parameters to optimize cerebral perfusion and minimize risk of hemorrhage  - Monitor temperature, glucose, and sodium. Initiate appropriate interventions as ordered  Outcome: Progressing     Problem: PAIN - ADULT  Goal: Verbalizes/displays adequate comfort level or patient's stated pain goal  Description: INTERVENTIONS:  - Encourage pt to monitor pain and request assistance  - Assess pain using appropriate pain scale  - Administer analgesics based on type and severity of pain and evaluate response  - Implement non-pharmacological measures as appropriate and evaluate response  - Consider cultural and social influences on pain and pain management  - Manage/alleviate anxiety  - Utilize distraction and/or relaxation techniques  - Monitor for opioid side effects  - Notify MD/LIP if interventions unsuccessful or patient reports new pain  - Anticipate increased pain with activity and pre-medicate as appropriate  Outcome: Progressing

## 2024-12-05 NOTE — PROGRESS NOTES
[facility]  part of [facility]    Progress Note    Alisha Wilde Patient Status:  Inpatient    10/25/1965 MRN [id number]   Location [facility] 2W/SW Attending Hayde Brito MD   Hosp Day # 13 PCP Bridger Avendano MD     Chief Complaint:   Chief Complaint   Patient presents with    Chest Pain Angina       Subjective:   Alisha Wilde has trach collar in place. Awake and alert. SBT to be done today.     Objective:   Objective:    Blood pressure 142/64, pulse 74, temperature 98.4 °F (36.9 °C), temperature source Axillary, resp. rate 18, height 5' 5\" (1.651 m), weight 282 lb 3 oz (128 kg), SpO2 99%, not currently breastfeeding.    Physical Exam:    General: No acute distress. Intubated.   Respiratory: Diminished bases   Cardiovascular: S1, S2. Regular rate and rhythm. No murmurs, rubs or gallops.   Abdomen: Soft, nontender, distended.  Positive bowel sounds. No rebound or guarding.  Extremities: 2+ pitting edema x 4 ext    Results:   Results:    Labs:  Recent Labs   Lab 24  0457 24  0510 24  0432 24  0510 24   WBC 12.2* 11.9* 10.4 11.5* 11.9* 13.4* 12.2*   HGB 8.8* 7.7* 8.3* 8.0* 7.7* 8.7* 9.0*   MCV 83.2 82.7 81.2 83.8 83.7 84.8 83.0   .0 168.0 211.0 194.0 238.0 216.0 224.0   BAND 5 3 10 3 4  --   --    INR  --   --   --   --  1.17  --   --        Recent Labs   Lab 24  0312 24  0510 246   *  130* 128* 118* 120*   BUN 33*  33* 42* 44* 41*   CREATSERUM 1.60*  1.60* 1.65* 1.78* 1.61*   CA 8.9  8.9 9.0 9.2 9.1   ALB 3.4 3.5  --  3.6     140 139 141 142   K 4.5  4.5 4.4 4.5 4.0   *  113* 111 114* 114*   CO2 19.0*  19.0* 17.0* 17.0* 17.0*   ALKPHO 86  --   --  86   AST 25  --   --  31   ALT 32  --   --  33   BILT 0.3  --   --  0.4   TP 6.3  --   --  6.3       Estimated Creatinine Clearance: 33 mL/min (A) (based on SCr of 1.61 mg/dL (H)).    Recent Labs   Lab  12/04/24  0510   PTP 15.6*   INR 1.17              Culture:  No results found for this visit on 11/21/24.    Cardiac  No results for input(s): \"TROP\", \"PBNP\" in the last 168 hours.        Imaging: Imaging data reviewed in [facility].  XR CHEST AP PORTABLE  (CPT=71045)    Result Date: 12/5/2024  CONCLUSION:   No significant change when compared to 12/04/2024.    Dictated by (CST): Panchito Shahid MD on 12/05/2024 at 8:35 AM     Finalized by (CST): Panchito Shahid MD on 12/05/2024 at 8:38 AM          XR CHEST AP PORTABLE  (CPT=71045)    Result Date: 12/4/2024  CONCLUSION:  1. Mild cardiomegaly and minimally prominent pulmonary vascularity but no huang pulmonary edema.  ET tube and NG tubes have been removed.  Tracheostomy is now noted in the trachea extending to the level the clavicles.  No pneumothorax. 2. Persistent patchy bilateral upper lobe airspace disease right more than left suggesting persistent bilateral upper lobe pneumonia.  Correlate clinically and continued follow-up is advised.    Dictated by (CST): Adalberto Santos MD on 12/04/2024 at 9:39 AM     Finalized by (CST): Adalberto Santos MD on 12/04/2024 at 9:41 AM          XR CHEST AP PORTABLE  (CPT=71045)    Result Date: 12/4/2024  CONCLUSION:  1. Endotracheal and enteric tubes are in stable customary positioning. 2. New right-sided PICC is in customary positioning.  No pneumothorax. 3. Stable patchy airspace opacities in both lungs compatible with multifocal pneumonia.    Dictated by (CST): Nathanael Hernandez MD on 12/04/2024 at 8:01 AM     Finalized by (CST): Nathanael Hernandez MD on 12/04/2024 at 8:05 AM           Medications:    aspirin  300 mg Rectal Daily    metoclopramide  5 mg Intravenous q6h    docusate  100 mg Oral BID    sodium ferric gluconate  125 mg Intravenous Daily    isosorbide dinitrate  40 mg Per NG Tube TID (Nitrates)    [Held by provider] furosemide  40 mg Intravenous Daily    hydrALAZINE  150 mg Oral Q8H GABRIEL    cloNIDine  1 patch Transdermal  Weekly    carvedilol  6.25 mg Oral BID with meals    amLODIPine  10 mg Oral Daily    sodium chloride  3 mL Nebulization 4 times per day    albuterol  2.5 mg Nebulization 4 times per day    heparin  5,000 Units Subcutaneous Q8H [facility]    chlorhexidine gluconate  15 mL Mouth/Throat BID    famotidine  20 mg Intravenous Daily         Assessment and Plan:   Assessment & Plan:      Type A aortic dissection   S/p emergent repair 11/22/24   On cardene drip per Cards for blood pressure management  Antihypertensives being slowly added now that PEG is in place   New picc 12/2  CV surgery, cards and critical care on consult.   TTE LVEF 75-80%.   Cont BP control.   Acute hypoxic respiratory failure   Aspiration event   Completed 10 days of zosyn.   Sputum cx candida   Failed SBT multiple times. Plans for trach tomorrow. PEG 12/5   ENT and GI on consult. Plan to remove sutures tomorrow   Pulmonary on consult.   Lasix prn for fluid overload per Nephrolopgy   CXR with multifocal airspace dz  Albuterol nebs   Status post trach placement 12/4  Status post PEG placement today 12/5  H/o tobacco and ETOH abuse   Duonebs PRN   NAIMA on CKD III   Renal on consult.   [place] to be secondary to MARY LOU/ATN   BUN/creat trending up. Lasix per Renal   Essential HTN   Coreg 6.25mg BID, norvasc 10mg daily, hydralazine 150mg TID. Clonidinie patch 0.2mg daily isosorbide 40mg TID.   Stop lasix.   ARB on hold due to NAIMA.   Cardene drip   Iron def anemia  IV iron.   Iron level low   Transfuse for Hb < 7.0   Other medical problems  Morbid Obesity   TANYA     >55min spent, >50% spent counseling and coordinating care in the form of educating pt/family and d/w consultants and staff. Most of the time spent discussing the above plan.        Plan of care discussed with patient or family at bedside.      Missy Paulson MD  Hospitalist          Supplementary Documentation:     Quality:  DVT Prophylaxis: heparin   CODE status: Full  Dispo: per clinical course             Estimated date of discharge: TBD  Discharge is dependent on: clinical stability  At this point Ms. Wilde is expected to be discharge to: TBD

## 2024-12-05 NOTE — PLAN OF CARE
Pt rec'd from OR s/p trach placement. Bradycardic on arrival to unit; atropine x1 administered. Pt placed into semi-fowlers; oral / airway suctioning performed. Respirations regular/easy on vent. See flowsheets for vent settings. Labetalol gtt off; nicardipine gtt started per MD. 1 unit PRBC's administered followed by lasix IVP x1. Nephrology on unit; updated to POC. Approx 1815; pt's friend visiting; RN assisted with moving recliner to bedside.     Problem: Patient Centered Care  Goal: Patient preferences are identified and integrated in the patient's plan of care  Description: Interventions:  - What would you like us to know as we care for you? I work at [employer] and I am still very good friends with people from grade school! My brother, Osbaldo Prince, has been making decisions for me.  - Provide timely, complete, and accurate information to patient/family  - Incorporate patient and family knowledge, values, beliefs, and cultural backgrounds into the planning and delivery of care  - Encourage patient/family to participate in care and decision-making at the level they choose  - Honor patient and family perspectives and choices  12/4/2024 1828 by Chinmay Still, RN  Outcome: Progressing  12/4/2024 1827 by Chinmay Still, RN  Outcome: Progressing     Problem: Diabetes/Glucose Control  Goal: Glucose maintained within prescribed range  Description: INTERVENTIONS:  - Monitor Blood Glucose as ordered  - Assess for signs and symptoms of hyperglycemia and hypoglycemia  - Administer ordered medications to maintain glucose within target range  - Assess barriers to adequate nutritional intake and initiate nutrition consult as needed  - Instruct patient on self management of diabetes  12/4/2024 1828 by Chinmay Still, RN  Outcome: Progressing  12/4/2024 1827 by Chinmay Still, RN  Outcome: Progressing     Problem: Patient/Family Goals  Goal: Patient/Family Long Term Goal  Description: Patient's Long Term  Goal: To discharge from the hospital safely    Interventions:  - surgical interventions, rounding, medications  - See additional Care Plan goals for specific interventions  12/4/2024 1828 by Chinmay Still RN  Outcome: Progressing  12/4/2024 1827 by Chinmay Still RN  Outcome: Progressing  Goal: Patient/Family Short Term Goal  Description: Patient's Short Term Goal: to breathe better    Interventions:   - manage the ventilator for pt until she is able to breath on her own.  - See additional Care Plan goals for specific interventions  12/4/2024 1828 by Chinmay Still RN  Outcome: Progressing  12/4/2024 1827 by Chinmay Still RN  Outcome: Progressing     Problem: CARDIOVASCULAR - ADULT  Goal: Maintains optimal cardiac output and hemodynamic stability  Description: INTERVENTIONS:  - Monitor vital signs, rhythm, and trends  - Monitor for bleeding, hypotension and signs of decreased cardiac output  - Evaluate effectiveness of vasoactive medications to optimize hemodynamic stability  - Monitor arterial and/or venous puncture sites for bleeding and/or hematoma  - Assess quality of pulses, skin color and temperature  - Assess for signs of decreased coronary artery perfusion - ex. Angina  - Evaluate fluid balance, assess for edema, trend weights  12/4/2024 1828 by Chinmay Still RN  Outcome: Progressing  12/4/2024 1827 by Chinmay Still RN  Outcome: Progressing  Goal: Absence of cardiac arrhythmias or at baseline  Description: INTERVENTIONS:  - Continuous cardiac monitoring, monitor vital signs, obtain 12 lead EKG if indicated  - Evaluate effectiveness of antiarrhythmic and heart rate control medications as ordered  - Initiate emergency measures for life threatening arrhythmias  - Monitor electrolytes and administer replacement therapy as ordered  12/4/2024 1828 by Chinmay Still RN  Outcome: Progressing  12/4/2024 1827 by Chnimay Still RN  Outcome: Progressing     Problem: RESPIRATORY -  ADULT  Goal: Achieves optimal ventilation and oxygenation  Description: INTERVENTIONS:  - Assess for changes in respiratory status  - Assess for changes in mentation and behavior  - Position to facilitate oxygenation and minimize respiratory effort  - Oxygen supplementation based on oxygen saturation or ABGs  - Provide Smoking Cessation handout, if applicable  - Encourage broncho-pulmonary hygiene including cough, deep breathe, Incentive Spirometry  - Assess the need for suctioning and perform as needed  - Assess and instruct to report SOB or any respiratory difficulty  - Respiratory Therapy support as indicated  - Manage/alleviate anxiety  - Monitor for signs/symptoms of CO2 retention  12/4/2024 1828 by Chinmay Still RN  Outcome: Progressing  12/4/2024 1827 by Chinmay Still RN  Outcome: Progressing     Problem: GASTROINTESTINAL - ADULT  Goal: Minimal or absence of nausea and vomiting  Description: INTERVENTIONS:  - Maintain adequate hydration with IV or PO as ordered and tolerated  - Nasogastric tube to low intermittent suction as ordered  - Evaluate effectiveness of ordered antiemetic medications  - Provide nonpharmacologic comfort measures as appropriate  - Advance diet as tolerated, if ordered  - Obtain nutritional consult as needed  - Evaluate fluid balance  12/4/2024 1828 by Chinmay Still RN  Outcome: Progressing  12/4/2024 1827 by Chinmay Still RN  Outcome: Progressing  Goal: Maintains or returns to baseline bowel function  Description: INTERVENTIONS:  - Assess bowel function  - Maintain adequate hydration with IV or PO as ordered and tolerated  - Evaluate effectiveness of GI medications  - Encourage mobilization and activity  - Obtain nutritional consult as needed  - Establish a toileting routine/schedule  - Consider collaborating with pharmacy to review patient's medication profile  12/4/2024 1828 by Chinmay Still RN  Outcome: Progressing  12/4/2024 1827 by Chinmay Still,  RN  Outcome: Progressing     Problem: METABOLIC/FLUID AND ELECTROLYTES - ADULT  Goal: Glucose maintained within prescribed range  Description: INTERVENTIONS:  - Monitor Blood Glucose as ordered  - Assess for signs and symptoms of hyperglycemia and hypoglycemia  - Administer ordered medications to maintain glucose within target range  - Assess barriers to adequate nutritional intake and initiate nutrition consult as needed  - Instruct patient on self management of diabetes  12/4/2024 1828 by Chinmay Still RN  Outcome: Progressing  12/4/2024 1827 by Chinmay Still RN  Outcome: Progressing  Goal: Electrolytes maintained within normal limits  Description: INTERVENTIONS:  - Monitor labs and rhythm and assess patient for signs and symptoms of electrolyte imbalances  - Administer electrolyte replacement as ordered  - Monitor response to electrolyte replacements, including rhythm and repeat lab results as appropriate  - Fluid restriction as ordered  - Instruct patient on fluid and nutrition restrictions as appropriate  12/4/2024 1828 by Chinmay Still RN  Outcome: Progressing  12/4/2024 1827 by Chinmay Still RN  Outcome: Progressing  Goal: Hemodynamic stability and optimal renal function maintained  Description: INTERVENTIONS:  - Monitor labs and assess for signs and symptoms of volume excess or deficit  - Monitor intake, output and patient weight  - Monitor urine specific gravity, serum osmolarity and serum sodium as indicated or ordered  - Monitor response to interventions for patient's volume status, including labs, urine output, blood pressure (other measures as available)  - Encourage oral intake as appropriate  - Instruct patient on fluid and nutrition restrictions as appropriate  12/4/2024 1828 by Chinmay Still RN  Outcome: Progressing  12/4/2024 1827 by Chinmay Still RN  Outcome: Progressing     Problem: SKIN/TISSUE INTEGRITY - ADULT  Goal: Skin integrity remains intact  Description:  INTERVENTIONS  - Assess and document risk factors for pressure ulcer development  - Assess and document skin integrity  - Monitor for areas of redness and/or skin breakdown  - Initiate interventions, skin care algorithm/standards of care as needed  12/4/2024 1828 by Chinmay Still RN  Outcome: Progressing  12/4/2024 1827 by Chinmay Still RN  Outcome: Progressing  Goal: Incision(s), wounds(s) or drain site(s) healing without S/S of infection  Description: INTERVENTIONS:  - Assess and document risk factors for pressure ulcer development  - Assess and document skin integrity  - Assess and document dressing/incision, wound bed, drain sites and surrounding tissue  - Implement wound care per orders  - Initiate isolation precautions as appropriate  - Initiate Pressure Ulcer prevention bundle as indicated  12/4/2024 1828 by Chinmay Still RN  Outcome: Progressing  12/4/2024 1827 by Chinmay Still RN  Outcome: Progressing  Goal: Oral mucous membranes remain intact  Description: INTERVENTIONS  - Assess oral mucosa and hygiene practices  - Implement preventative oral hygiene regimen  - Implement oral medicated treatments as ordered  12/4/2024 1828 by Chinmay Still RN  Outcome: Progressing  12/4/2024 1827 by Chinmay Still RN  Outcome: Progressing     Problem: HEMATOLOGIC - ADULT  Goal: Maintains hematologic stability  Description: INTERVENTIONS  - Assess for signs and symptoms of bleeding or hemorrhage  - Monitor labs and vital signs for trends  - Administer supportive blood products/factors, fluids and medications as ordered and appropriate  - Administer supportive blood products/factors as ordered and appropriate  12/4/2024 1828 by Chinmay Still RN  Outcome: Progressing  12/4/2024 1827 by Chinmay Still RN  Outcome: Progressing     Problem: MUSCULOSKELETAL - ADULT  Goal: Return mobility to safest level of function  Description: INTERVENTIONS:  - Assess patient stability and activity  tolerance for standing, transferring and ambulating w/ or w/o assistive devices  - Assist with transfers and ambulation using safe patient handling equipment as needed  - Ensure adequate protection for wounds/incisions during mobilization  - Obtain PT/OT consults as needed  - Advance activity as appropriate  - Communicate ordered activity level and limitations with patient/family  12/4/2024 1828 by Chinmay Still RN  Outcome: Progressing  12/4/2024 1827 by Chinmay Still RN  Outcome: Progressing  Goal: Maintain proper alignment of affected body part  Description: INTERVENTIONS:  - Support and protect limb and body alignment per provider's orders  - Instruct and reinforce with patient and family use of appropriate assistive device and precautions (e.g. spinal or hip dislocation precautions)  12/4/2024 1828 by Chinmay Still RN  Outcome: Progressing  12/4/2024 1827 by Chinmay Still RN  Outcome: Progressing     Problem: NEUROLOGICAL - ADULT  Goal: Achieves stable or improved neurological status  Description: INTERVENTIONS  - Assess for and report changes in neurological status  - Initiate measures to prevent increased intracranial pressure  - Maintain blood pressure and fluid volume within ordered parameters to optimize cerebral perfusion and minimize risk of hemorrhage  - Monitor temperature, glucose, and sodium. Initiate appropriate interventions as ordered  12/4/2024 1828 by Chinmay Still RN  Outcome: Progressing  12/4/2024 1827 by Chinmay Still RN  Outcome: Progressing     Problem: PAIN - ADULT  Goal: Verbalizes/displays adequate comfort level or patient's stated pain goal  Description: INTERVENTIONS:  - Encourage pt to monitor pain and request assistance  - Assess pain using appropriate pain scale  - Administer analgesics based on type and severity of pain and evaluate response  - Implement non-pharmacological measures as appropriate and evaluate response  - Consider cultural and social  influences on pain and pain management  - Manage/alleviate anxiety  - Utilize distraction and/or relaxation techniques  - Monitor for opioid side effects  - Notify MD/LIP if interventions unsuccessful or patient reports new pain  - Anticipate increased pain with activity and pre-medicate as appropriate  12/4/2024 1828 by Chinmay Still RN  Outcome: Progressing  12/4/2024 1827 by Chinmay Still RN  Outcome: Progressing     Problem: Safety Risk - Non-Violent Restraints  Goal: Patient will remain free from self-harm  Description: INTERVENTIONS:  - Apply the least restrictive restraint to prevent harm  - Notify patient and family of reasons restraints applied  - Assess for any contributing factors to confusion (electrolyte disturbances, delirium, medications)  - Discontinue any unnecessary medical devices as soon as possible  - Assess the patient's physical comfort, circulation, skin condition, hydration, nutrition and elimination needs   - Reorient and redirection as needed  - Assess for the need to continue restraints  12/4/2024 1828 by Chinmay Still RN  Outcome: Completed  12/4/2024 1827 by Chinmay Still RN  Outcome: Progressing  12/4/2024 0818 by Chinmay Still RN  Outcome: Progressing

## 2024-12-05 NOTE — DIETARY NOTE
ADULT NUTRITION REASSESSMENT    Pt is at high nutrition risk.  Pt does not meet malnutrition criteria.      RECOMMENDATIONS TO MD: See Nutrition Intervention for EN specifics . Trach and PEG placement. Resume TF per MD when route available.      ADMITTING DIAGNOSIS:  Dissection of ascending aorta (HCC)  PERTINENT PAST MEDICAL HISTORY:   Past Medical History:    Chronic kidney disease (CKD)    Esophageal reflux    High blood pressure    High cholesterol    HYPERTENSION    Hypertension    Unspecified essential hypertension     PATIENT STATUS: Initial 11/27/24: RD received consult to initiate tube feedings. Pt is POD #5 emergent aortic dissection and repair. Pt has been intubated, unable to extubate failing SBT's. NPO x 6 days today. Well-nourished. No weight loss, actually some gain since admission, likely r/t post op fluid changes. On Lasix. No pressors. Not at significant risk for refeeding syndrome.   1127:  -Attempted to discuss with CYNDIE Milan via phone call. RN states Propofol is current running at 45 mcg/kg/min (31.6ml/hr providing 835 kcal from lipids).   -RN states IV fluids currently running: D5/NaCl 0.45%. States current rate in chart is accurate (40 ml/hr). IVF status has not been updated in active meds nor in flowsheets since 11/25 @ 0600.   -RN states pt continues on insulin drip per protocol \"while pt is intubated.\"   -Sent secure chat to APN today to confirm if IVF/insulin drip will continue once tube feedings are started. Awaiting response.     Update 12/2/24: Pt cont to require full vent support. Failing SBTs. Will need trach and PEG this week per MD chart notes. Receiving high dose propofol  at 50mcg/kg/min. Blood sugars well-controlled. Non DM, off insulin gtt. Pt having thick secretions requiring significant amount of suction. Having BM's, last on 11/30. Now with new open keloid wound to right breast. Seen by wound care RN. Cont TF, tolerating at goal rate with modular protein added BID.      Update 12/5/24: Pt had new trach placed yesterday, doing well. Restraints off during the day. PEG placed this AM by Dr. Barrientos. Plans to resume Gtube feedings if safe after 24 hours following placement. Will adjust TF order as appropriate tomorrow in anticipation of resuming via Gtube. Monitor propofol titration/dc. On Reglan. May need to utilize renal formula until phos comes down WNL.     FOOD/NUTRITION RELATED HISTORY:  Appetite: NPO  Intake: NPO  Intake Meeting Needs: NPO and TF + non-nutritive calories are meeting needs- will resume TF 24 hours after PEG placement, on hold for now.  Percent Meals Eaten (last 6 days)       None            Food Allergies: No Known Food Allergies (NKFA)  Cultural/Ethnic/Taoist Preferences: Not Obtained    GASTROINTESTINAL:  Addendum 12/6: +BM today, small, brown, soft.    MEDICATIONS: reviewed Propofol running at 17.6 ml/hr providing 465 kcals from lipids.    niCARdipine 15 mg/hr (12/06/24 0800)    dexmedetomidine 1.2 mcg/kg/hr (12/06/24 0742)    propofol 25 mcg/kg/min (12/06/24 0848)      furosemide  40 mg Intravenous Once    sodium bicarbonate  50 mEq Intravenous Once    aspirin  300 mg Rectal Daily    metoclopramide  5 mg Intravenous q6h    docusate  100 mg Oral BID    sodium ferric gluconate  125 mg Intravenous Daily    isosorbide dinitrate  40 mg Per NG Tube TID (Nitrates)    [Held by provider] furosemide  40 mg Intravenous Daily    hydrALAZINE  150 mg Oral Q8H GABRIEL    cloNIDine  1 patch Transdermal Weekly    carvedilol  6.25 mg Oral BID with meals    amLODIPine  10 mg Oral Daily    sodium chloride  3 mL Nebulization 4 times per day    albuterol  2.5 mg Nebulization 4 times per day    heparin  5,000 Units Subcutaneous Q8H GABRIEL    chlorhexidine gluconate  15 mL Mouth/Throat BID    famotidine  20 mg Intravenous Daily     LABS: reviewed; Phos still trending high; TG's 261 on 12/5  Recent Labs     12/02/24  0434 12/02/24  0636 12/03/24  0312 12/04/24  0510 12/04/24  7038  12/05/24  0416   *  --  130*  130* 128* 118* 120*   BUN  --    < > 33*  33* 42* 44* 41*   CREATSERUM 1.46*  --  1.60*  1.60* 1.65* 1.78* 1.61*   CA 9.1  --  8.9  8.9 9.0 9.2 9.1   MG 2.2  --  2.3 2.5  --   --      --  140  140 139 141 142   K  --    < > 4.5  4.5 4.4 4.5 4.0     --  113*  113* 111 114* 114*   CO2 20.0*  --  19.0*  19.0* 17.0* 17.0* 17.0*   PHOS 5.2*  --  5.9* 5.3*  --   --    OSMOCALC  --    < > 299*  299* 300* 304* 305*    < > = values in this interval not displayed.     NUTRITION RELATED PHYSICAL FINDINGS:  - Nutrition Focused Physical Exam (NFPE): well nourished  - Fluid Accumulation: non-pitting Bilateral Hand (s), +1 Bilateral Feet, and +2 generalized ---> See RN documentation for details  - Skin Integrity: surgical wound(s) and other wounds noted ---> See RN documentation for details    ANTHROPOMETRICS:  HT: 165.1 cm (5' 5\")  WT: 128 kg (282 lb 3 oz)   BMI: Body mass index is 46.96 kg/m².  BMI CLASSIFICATION: greater than 40 kg/m2 - morbid obesity class III  IBW: 115 lbs        246% IBW  Usual Body Wt: ~245 lbs in the past per EMR        115% UBW    WEIGHT HISTORY:  Patient Weight(s) for the past 336 hrs:   Weight   12/05/24 0426 128 kg (282 lb 3 oz)   12/04/24 0322 132 kg (291 lb 0.1 oz)   12/03/24 0300 129.5 kg (285 lb 7.9 oz)   12/01/24 0600 130.8 kg (288 lb 5.8 oz)   11/30/24 0300 121.8 kg (268 lb 8.3 oz)   11/29/24 0600 120.8 kg (266 lb 5.1 oz)   11/27/24 0600 129.1 kg (284 lb 9.8 oz)   11/26/24 0621 128 kg (282 lb 3 oz)   11/25/24 0800 125 kg (275 lb 9.2 oz)   11/22/24 0419 117.1 kg (258 lb 2.5 oz)   11/22/24 0200 117.1 kg (258 lb 2.5 oz)     Wt Readings from Last 10 Encounters:   12/05/24 128 kg (282 lb 3 oz)   10/24/24 115.2 kg (254 lb)   05/17/24 112.9 kg (249 lb)   02/22/24 112.9 kg (249 lb)   12/06/23 112.9 kg (249 lb)   10/09/23 112.5 kg (248 lb)   09/25/23 111.1 kg (245 lb)   08/10/23 111.7 kg (246 lb 4.1 oz)   08/09/23 111.7 kg (246 lb 4.8 oz)    03/25/23 109.3 kg (241 lb)     NUTRITION DIAGNOSIS/PROBLEM:   Inadequate oral intake related to Decreased ability to consume sufficient energy as evidenced by NPO status and no PO intake during admission, and intubation requiring nutrition support.     Progress:   Improvement, unresolved - TF to resume after PEG per MD, will meet needs once at goal rate.     NUTRITION INTERVENTION:     NUTRITION PRESCRIPTION:   Estimated Nutrition needs: --dosing wt of 117 kg - wt taken on 11/22 - likely close to dry weight.  Calories: 0547-6275 calories/day (15-18 calories per kg Dosing wt)  [place]: 1911 kcal (recalculated on 12/5)  Protein: 62- 104g protein/day (1.2-2 g protein/kg Ideal body wt (IBW))  Fluid Needs: 1ml/kcal or 25ml/kg adjusted weight for obesity= 2075ml (83 kg adjusted)  Fluid overloaded at this time, edematous, minimal FWF    - Diet:       Procedures    NPO      Addendum 12/6: Phos now WNL (3.6 today)  -If/when TF able to resume via new Gtube per MD:   Jevity 1.2 at 25 ml/hr advance by 10 ml q 4 hours to goal rate of 55 ml/hr via Gtube. Based on average 22 hour infusion time. Goal rate (1452 kcals) + Propofol (465 kcals) provides 1917 kcal, 67 grams protein, 980 ml water, and 100% RDI's. Water flushes 30 ml q 4 hours (180 ml).  Total fluid daily = 1160 ml    - RD Care Plan:  Tolerating EN- to resume per MD  - Medical Food Supplements-RD added NPO. Rational/use of oral supplements discussed.  - Vitamin and mineral supplements: none  - Feeding assistance: NPO  - Nutrition education: not appropriate     - Coordination of nutrition care: collaboration with other providers  - Discharge and transfer of nutrition care to new setting or provider: monitor plans    MONITOR AND EVALUATE/NUTRITION GOALS:  - Food and Nutrient Intake:      Monitor: for PO initiation when safe  - Food and Nutrient Administration:      Monitor: tolerance to enteral nutrition, for enteral nutrition adjustment, and propofol rate  -  Anthropometric Measurement:    Monitor weight  - Nutrition Goals:      allow wt loss due to fluid losses, long term gradual wt loss, TF meet >80% of goal within first week , labs within acceptable limits, and monitor fluid status    DIETITIAN FOLLOW UP: RD to follow and monitor nutrition status       Stefany Argueta RD, LDN  Clinical Dietitian  P: [phone]

## 2024-12-06 LAB
ALBUMIN SERPL-MCNC: 3.4 G/DL (ref 3.2–4.8)
ANION GAP SERPL CALC-SCNC: 12 MMOL/L (ref 0–18)
BASOPHILS # BLD AUTO: 0.06 X10(3) UL (ref 0–0.2)
BASOPHILS NFR BLD AUTO: 0.6 %
BUN BLD-MCNC: 38 MG/DL (ref 9–23)
BUN/CREAT SERPL: 26 (ref 10–20)
CALCIUM BLD-MCNC: 8.8 MG/DL (ref 8.7–10.4)
CHLORIDE SERPL-SCNC: 116 MMOL/L (ref 98–112)
CO2 SERPL-SCNC: 17 MMOL/L (ref 21–32)
CREAT BLD-MCNC: 1.46 MG/DL
DEPRECATED RDW RBC AUTO: 51.3 FL (ref 35.1–46.3)
EGFRCR SERPLBLD CKD-EPI 2021: 41 ML/MIN/1.73M2 (ref 60–?)
EOSINOPHIL # BLD AUTO: 0.36 X10(3) UL (ref 0–0.7)
EOSINOPHIL NFR BLD AUTO: 3.8 %
ERYTHROCYTE [DISTWIDTH] IN BLOOD BY AUTOMATED COUNT: 16.7 % (ref 11–15)
GLUCOSE BLD-MCNC: 119 MG/DL (ref 70–99)
GLUCOSE BLDC GLUCOMTR-MCNC: 103 MG/DL (ref 70–99)
GLUCOSE BLDC GLUCOMTR-MCNC: 103 MG/DL (ref 70–99)
GLUCOSE BLDC GLUCOMTR-MCNC: 113 MG/DL (ref 70–99)
HCT VFR BLD AUTO: 26.3 %
HGB BLD-MCNC: 8.5 G/DL
IMM GRANULOCYTES # BLD AUTO: 0.31 X10(3) UL (ref 0–1)
IMM GRANULOCYTES NFR BLD: 3.3 %
LYMPHOCYTES # BLD AUTO: 1.09 X10(3) UL (ref 1–4)
LYMPHOCYTES NFR BLD AUTO: 11.5 %
MAGNESIUM SERPL-MCNC: 2.7 MG/DL (ref 1.6–2.6)
MCH RBC QN AUTO: 27.1 PG (ref 26–34)
MCHC RBC AUTO-ENTMCNC: 32.3 G/DL (ref 31–37)
MCV RBC AUTO: 83.8 FL
MONOCYTES # BLD AUTO: 0.96 X10(3) UL (ref 0.1–1)
MONOCYTES NFR BLD AUTO: 10.1 %
NEUTROPHILS # BLD AUTO: 6.7 X10 (3) UL (ref 1.5–7.7)
NEUTROPHILS # BLD AUTO: 6.7 X10(3) UL (ref 1.5–7.7)
NEUTROPHILS NFR BLD AUTO: 70.7 %
OSMOLALITY SERPL CALC.SUM OF ELEC: 310 MOSM/KG (ref 275–295)
PHOSPHATE SERPL-MCNC: 3.6 MG/DL (ref 2.4–5.1)
PLATELET # BLD AUTO: 264 10(3)UL (ref 150–450)
POTASSIUM SERPL-SCNC: 3.6 MMOL/L (ref 3.5–5.1)
RBC # BLD AUTO: 3.14 X10(6)UL
SODIUM SERPL-SCNC: 145 MMOL/L (ref 136–145)
WBC # BLD AUTO: 9.5 X10(3) UL (ref 4–11)

## 2024-12-06 PROCEDURE — 99291 CRITICAL CARE FIRST HOUR: CPT | Performed by: INTERNAL MEDICINE

## 2024-12-06 PROCEDURE — 99233 SBSQ HOSP IP/OBS HIGH 50: CPT | Performed by: INTERNAL MEDICINE

## 2024-12-06 PROCEDURE — 99233 SBSQ HOSP IP/OBS HIGH 50: CPT | Performed by: HOSPITALIST

## 2024-12-06 RX ORDER — FUROSEMIDE 10 MG/ML
40 INJECTION INTRAMUSCULAR; INTRAVENOUS DAILY
Status: DISCONTINUED | OUTPATIENT
Start: 2024-12-06 | End: 2024-12-07

## 2024-12-06 RX ORDER — ASPIRIN 81 MG/1
81 TABLET, CHEWABLE ORAL DAILY
Status: DISCONTINUED | OUTPATIENT
Start: 2024-12-06 | End: 2024-12-23

## 2024-12-06 RX ORDER — QUETIAPINE FUMARATE 25 MG/1
50 TABLET, FILM COATED ORAL 2 TIMES DAILY
Status: DISCONTINUED | OUTPATIENT
Start: 2024-12-06 | End: 2024-12-12

## 2024-12-06 RX ORDER — CARVEDILOL 25 MG/1
25 TABLET ORAL 2 TIMES DAILY WITH MEALS
Status: DISCONTINUED | OUTPATIENT
Start: 2024-12-06 | End: 2024-12-08

## 2024-12-06 NOTE — RESPIRATORY THERAPY NOTE
Pt received with Portex 8 tracheostomy on full vent support. Current vent settings-       12/06/24 0413   Vent Information   Vent Mode VC+   Settings   FiO2 (%) 30 %   Resp Rate (Set) 18   Vt (Set, mL) 550 mL   PEEP/CPAP (cm H2O) 5 cm H20     Trach care and suction provided as needed. Bilateral b/s auscultated. Scheduled nebs given as ordered. RT will continue to monitor.

## 2024-12-06 NOTE — PLAN OF CARE
Problem: Diabetes/Glucose Control  Goal: Glucose maintained within prescribed range  Description: INTERVENTIONS:  - Monitor Blood Glucose as ordered  - Assess for signs and symptoms of hyperglycemia and hypoglycemia  - Administer ordered medications to maintain glucose within target range  - Assess barriers to adequate nutritional intake and initiate nutrition consult as needed  - Instruct patient on self management of diabetes  Outcome: Progressing     Problem: Patient Centered Care  Goal: Patient preferences are identified and integrated in the patient's plan of care  Description: Interventions:  - What would you like us to know as we care for you? I work at the [employer] and I am still very good friends with people from grade school! My brother, Osbaldo Prince, has been making decisions for me.  - Provide timely, complete, and accurate information to patient/family  - Incorporate patient and family knowledge, values, beliefs, and cultural backgrounds into the planning and delivery of care  - Encourage patient/family to participate in care and decision-making at the level they choose  - Honor patient and family perspectives and choices  Outcome: Progressing     Problem: Diabetes/Glucose Control  Goal: Glucose maintained within prescribed range  Description: INTERVENTIONS:  - Monitor Blood Glucose as ordered  - Assess for signs and symptoms of hyperglycemia and hypoglycemia  - Administer ordered medications to maintain glucose within target range  - Assess barriers to adequate nutritional intake and initiate nutrition consult as needed  - Instruct patient on self management of diabetes  Outcome: Progressing     Problem: Patient/Family Goals  Goal: Patient/Family Long Term Goal  Description: Patient's Long Term Goal: To discharge from the hospital safely    Interventions:  - surgical interventions, rounding, medications  - See additional Care Plan goals for specific interventions  Outcome: Progressing  Goal:  Patient/Family Short Term Goal  Description: Patient's Short Term Goal: to breathe better    Interventions:   - manage the ventilator for pt until she is able to breath on her own.  - See additional Care Plan goals for specific interventions  Outcome: Progressing     Problem: CARDIOVASCULAR - ADULT  Goal: Maintains optimal cardiac output and hemodynamic stability  Description: INTERVENTIONS:  - Monitor vital signs, rhythm, and trends  - Monitor for bleeding, hypotension and signs of decreased cardiac output  - Evaluate effectiveness of vasoactive medications to optimize hemodynamic stability  - Monitor arterial and/or venous puncture sites for bleeding and/or hematoma  - Assess quality of pulses, skin color and temperature  - Assess for signs of decreased coronary artery perfusion - ex. Angina  - Evaluate fluid balance, assess for edema, trend weights  Outcome: Progressing  Goal: Absence of cardiac arrhythmias or at baseline  Description: INTERVENTIONS:  - Continuous cardiac monitoring, monitor vital signs, obtain 12 lead EKG if indicated  - Evaluate effectiveness of antiarrhythmic and heart rate control medications as ordered  - Initiate emergency measures for life threatening arrhythmias  - Monitor electrolytes and administer replacement therapy as ordered  Outcome: Progressing     Problem: GASTROINTESTINAL - ADULT  Goal: Minimal or absence of nausea and vomiting  Description: INTERVENTIONS:  - Maintain adequate hydration with IV or PO as ordered and tolerated  - Nasogastric tube to low intermittent suction as ordered  - Evaluate effectiveness of ordered antiemetic medications  - Provide nonpharmacologic comfort measures as appropriate  - Advance diet as tolerated, if ordered  - Obtain nutritional consult as needed  - Evaluate fluid balance  Outcome: Progressing  Goal: Maintains or returns to baseline bowel function  Description: INTERVENTIONS:  - Assess bowel function  - Maintain adequate hydration with IV or PO  as ordered and tolerated  - Evaluate effectiveness of GI medications  - Encourage mobilization and activity  - Obtain nutritional consult as needed  - Establish a toileting routine/schedule  - Consider collaborating with pharmacy to review patient's medication profile  Outcome: Progressing     Problem: METABOLIC/FLUID AND ELECTROLYTES - ADULT  Goal: Glucose maintained within prescribed range  Description: INTERVENTIONS:  - Monitor Blood Glucose as ordered  - Assess for signs and symptoms of hyperglycemia and hypoglycemia  - Administer ordered medications to maintain glucose within target range  - Assess barriers to adequate nutritional intake and initiate nutrition consult as needed  - Instruct patient on self management of diabetes  Outcome: Progressing  Goal: Electrolytes maintained within normal limits  Description: INTERVENTIONS:  - Monitor labs and rhythm and assess patient for signs and symptoms of electrolyte imbalances  - Administer electrolyte replacement as ordered  - Monitor response to electrolyte replacements, including rhythm and repeat lab results as appropriate  - Fluid restriction as ordered  - Instruct patient on fluid and nutrition restrictions as appropriate  Outcome: Progressing  Goal: Hemodynamic stability and optimal renal function maintained  Description: INTERVENTIONS:  - Monitor labs and assess for signs and symptoms of volume excess or deficit  - Monitor intake, output and patient weight  - Monitor urine specific gravity, serum osmolarity and serum sodium as indicated or ordered  - Monitor response to interventions for patient's volume status, including labs, urine output, blood pressure (other measures as available)  - Encourage oral intake as appropriate  - Instruct patient on fluid and nutrition restrictions as appropriate  Outcome: Progressing     Problem: SKIN/TISSUE INTEGRITY - ADULT  Goal: Skin integrity remains intact  Description: INTERVENTIONS  - Assess and document risk factors  for pressure ulcer development  - Assess and document skin integrity  - Monitor for areas of redness and/or skin breakdown  - Initiate interventions, skin care algorithm/standards of care as needed  Outcome: Progressing  Goal: Incision(s), wounds(s) or drain site(s) healing without S/S of infection  Description: INTERVENTIONS:  - Assess and document risk factors for pressure ulcer development  - Assess and document skin integrity  - Assess and document dressing/incision, wound bed, drain sites and surrounding tissue  - Implement wound care per orders  - Initiate isolation precautions as appropriate  - Initiate Pressure Ulcer prevention bundle as indicated  Outcome: Progressing  Goal: Oral mucous membranes remain intact  Description: INTERVENTIONS  - Assess oral mucosa and hygiene practices  - Implement preventative oral hygiene regimen  - Implement oral medicated treatments as ordered  Outcome: Progressing     Problem: HEMATOLOGIC - ADULT  Goal: Maintains hematologic stability  Description: INTERVENTIONS  - Assess for signs and symptoms of bleeding or hemorrhage  - Monitor labs and vital signs for trends  - Administer supportive blood products/factors, fluids and medications as ordered and appropriate  - Administer supportive blood products/factors as ordered and appropriate  Outcome: Progressing     Problem: MUSCULOSKELETAL - ADULT  Goal: Return mobility to safest level of function  Description: INTERVENTIONS:  - Assess patient stability and activity tolerance for standing, transferring and ambulating w/ or w/o assistive devices  - Assist with transfers and ambulation using safe patient handling equipment as needed  - Ensure adequate protection for wounds/incisions during mobilization  - Obtain PT/OT consults as needed  - Advance activity as appropriate  - Communicate ordered activity level and limitations with patient/family  Outcome: Progressing  Goal: Maintain proper alignment of affected body part  Description:  INTERVENTIONS:  - Support and protect limb and body alignment per provider's orders  - Instruct and reinforce with patient and family use of appropriate assistive device and precautions (e.g. spinal or hip dislocation precautions)  Outcome: Progressing     Problem: NEUROLOGICAL - ADULT  Goal: Achieves stable or improved neurological status  Description: INTERVENTIONS  - Assess for and report changes in neurological status  - Initiate measures to prevent increased intracranial pressure  - Maintain blood pressure and fluid volume within ordered parameters to optimize cerebral perfusion and minimize risk of hemorrhage  - Monitor temperature, glucose, and sodium. Initiate appropriate interventions as ordered  Outcome: Progressing     Problem: PAIN - ADULT  Goal: Verbalizes/displays adequate comfort level or patient's stated pain goal  Description: INTERVENTIONS:  - Encourage pt to monitor pain and request assistance  - Assess pain using appropriate pain scale  - Administer analgesics based on type and severity of pain and evaluate response  - Implement non-pharmacological measures as appropriate and evaluate response  - Consider cultural and social influences on pain and pain management  - Manage/alleviate anxiety  - Utilize distraction and/or relaxation techniques  - Monitor for opioid side effects  - Notify MD/LIP if interventions unsuccessful or patient reports new pain  - Anticipate increased pain with activity and pre-medicate as appropriate  Outcome: Progressing

## 2024-12-06 NOTE — PROGRESS NOTES
[facility]  part of [facility]    Progress Note    Alisha Wilde Patient Status:  Inpatient    10/25/1965 MRN [id number]   Location [facility] 2W/SW Attending Hayde Brito MD   Hosp Day # 14 PCP Bridger Avendano MD     Chief Complaint:   Chief Complaint   Patient presents with    Chest Pain Angina       Subjective:   Alisha Wilde has trach collar in place.Eyes closed. Does not respond to name.     Objective:   Objective:    Blood pressure 135/61, pulse 73, temperature 98.4 °F (36.9 °C), temperature source Axillary, resp. rate 18, height 5' 5\" (1.651 m), weight 285 lb 4.4 oz (129.4 kg), SpO2 100%, not currently breastfeeding.    Physical Exam:    General: No acute distress. Intubated.   Respiratory: Diminished bases   Cardiovascular: S1, S2. Regular rate and rhythm. No murmurs, rubs or gallops.   Abdomen: Soft, nontender, distended.  Positive bowel sounds. No rebound or guarding.  Extremities: 2+ pitting edema x 4 ext    Results:   Results:    Labs:  Recent Labs   Lab 24  0457 24  0510 24  0432 242 24  0510 246 24  0534   WBC 12.2* 11.9* 10.4 11.5* 11.9* 13.4* 12.2* 9.5   HGB 8.8* 7.7* 8.3* 8.0* 7.7* 8.7* 9.0* 8.5*   MCV 83.2 82.7 81.2 83.8 83.7 84.8 83.0 83.8   .0 168.0 211.0 194.0 238.0 216.0 224.0 264.0   BAND 5 3 10 3 4  --   --   --    INR  --   --   --   --  1.17  --   --   --        Recent Labs   Lab 24  0510 24  1728 24  0416 24  0534   *  130* 128* 118* 120* 119*   BUN 33*  33* 42* 44* 41* 38*   CREATSERUM 1.60*  1.60* 1.65* 1.78* 1.61* 1.46*   CA 8.9  8.9 9.0 9.2 9.1 8.8   ALB 3.4 3.5  --  3.6 3.4     140 139 141 142 145   K 4.5  4.5 4.4 4.5 4.0 3.6   *  113* 111 114* 114* 116*   CO2 19.0*  19.0* 17.0* 17.0* 17.0* 17.0*   ALKPHO 86  --   --  86  --    AST 25  --   --  31  --    ALT 32  --   --  33  --    BILT 0.3  --   --  0.4  --    TP  6.3  --   --  6.3  --        Estimated Creatinine Clearance: 36.4 mL/min (A) (based on SCr of 1.46 mg/dL (H)).    Recent Labs   Lab 12/04/24  0510   PTP 15.6*   INR 1.17              Culture:  No results found for this visit on 11/21/24.    Cardiac  No results for input(s): \"TROP\", \"PBNP\" in the last 168 hours.        Imaging: Imaging data reviewed in [facility].  XR CHEST AP PORTABLE  (CPT=71045)    Result Date: 12/5/2024  CONCLUSION:   No significant change when compared to 12/04/2024.    Dictated by (CST): Panchito Shahid MD on 12/05/2024 at 8:35 AM     Finalized by (CST): Panchito Shahid MD on 12/05/2024 at 8:38 AM           Medications:    aspirin  81 mg Peg Tube Daily    carvedilol  25 mg Oral BID with meals    QUEtiapine  50 mg Per G Tube BID    furosemide  40 mg Intravenous Daily    senna  5 mL Per G Tube BID    cloNIDine  0.3 mg Oral TID    Followed by    [START ON 12/7/2024] cloNIDine  0.3 mg Oral BID    docusate  100 mg Oral BID    sodium ferric gluconate  125 mg Intravenous Daily    isosorbide dinitrate  40 mg Per NG Tube TID (Nitrates)    hydrALAZINE  150 mg Oral Q8H GABRIEL    amLODIPine  10 mg Oral Daily    sodium chloride  3 mL Nebulization 4 times per day    albuterol  2.5 mg Nebulization 4 times per day    heparin  5,000 Units Subcutaneous Q8H GABRIEL    chlorhexidine gluconate  15 mL Mouth/Throat BID    famotidine  20 mg Intravenous Daily         Assessment and Plan:   Assessment & Plan:      Type A aortic dissection   S/p emergent repair 11/22/24   On cardene drip per Cards for blood pressure management  Antihypertensives being slowly added now that PEG is in place- resume tube feeds today   New picc 12/2  CV surgery, cards and critical care on consult.   TTE LVEF 75-80%.   Cont BP control per Cards  Discharge to LTAC once blood pressure improves   Acute hypoxic respiratory failure   Aspiration event   Completed 10 days of zosyn.   Sputum cx candida   Failed SBT multiple times. Plans for trach tomorrow. PEG  12/5   ENT and GI on consult. Plan to remove sutures tomorrow   Pulmonary on consult.   Lasix every other day for fluid overload- per Nephrolopgy   CXR with multifocal airspace dz  Albuterol nebs   Status post trach placement 12/4  Status post PEG placement  12/5- resume tube feeds   Seroquel started to help with agitation 50 mg BID  On precedex and propofol- unsucessful weaning due to agitation   H/o tobacco and ETOH abuse   Duonebs PRN   CIWA protocol  NAIMA on CKD III   Renal on consult.   Belford to be secondary to MARY LOU/ATN   BUN/creat trending up. Lasix per Renal   Essential HTN   Coreg 25 mg BID, norvasc 10mg daily, hydralazine 150mg TID. Clonidinie patch 0.2mg TID   Add hydralazine combination with isosobide 150-40 mg TID   Stop lasix.- volume management per Renal  ARB on hold due to NAIMA.   Cardene drip- please wean off per Cards   Iron def anemia  IV iron.   Iron level low   Transfuse for Hb < 7.0   Other medical problems  Morbid Obesity   TANYA     >55min spent, >50% spent counseling and coordinating care in the form of educating pt/family and d/w consultants and staff. Most of the time spent discussing the above plan.        Plan of care discussed with patient or family at bedside.      Missy Paulson MD  Hospitalist          Supplementary Documentation:     Quality:  DVT Prophylaxis: heparin   CODE status: Full  Dispo: per clinical course            Estimated date of discharge: TBD  Discharge is dependent on: clinical stability  At this point Ms. Wilde is expected to be discharge to: TBD

## 2024-12-06 NOTE — PROGRESS NOTES
[facility]  part of [facility]    Cardiology Progress Note    Alisha Wilde Patient Status:  Inpatient    10/25/1965 MRN [id number]   Location [facility] 2W/SW Attending Aguila Villegas MD   Hosp Day # 14 PCP Bridger Avendano MD     Interval History   Status post trach and PEG tube  Off sedation, remains very agitated with severe hypertension - currently on propofol and Precedex    Assessment and Plan:   Acute hypoxic respiratory failure, c/b aspiration event requiring re-intubation  Type A aortic dissection  NAIMA on CKD-III, improved  Obesity  EtOH abuse  -presented with acute onset CP   -CT with type A aortic dissection above right coronary artery and extending distally into the left common iliac artery and external iliac   -s/p emergent successful repair on 24  -had aspiration event following self-extubation, re-intubated with multiple failed SBT; now s/p trach/PEG tube    Hypertension  -TTE on  with hyperdynamic LVEF  -continue BP management - substantial component of agitation worsening BP   -Increase coreg from 6.25 mg twice daily to 25 mg twice daily   -Switched to clonidine patch to 0.3 mg 3 times daily   -Continue combination hydralazine + isosorbide 150mg-40mg TID   -Continue amlodipine 10 mg daily   -Wean nicardipine gtt    -ARB held by nephrology due to NAIMA/CKD -> discuss with nephrology on initiation  -clinically at appears significantly overloaded; volume management per nephrology, on intermittent lasix  -monitor rhythm on tele    Objective:   Patient Vitals for the past 24 hrs:   BP Temp Temp src Pulse Resp SpO2 Weight   24 1500 119/66 -- -- 70 18 92 % --   24 1400 124/66 -- -- 71 18 95 % --   24 1300 122/66 -- -- 68 18 97 % --   24 1200 124/68 98.4 °F (36.9 °C) Axillary 68 18 97 % --   24 1100 127/75 -- -- 69 18 98 % --   24 1000 129/71 -- -- 69 18 97 % --   24 0900 128/69 -- -- 70 18 97 % --   24 0800 (!)  143/118 97.7 °F (36.5 °C) Temporal 70 18 98 % --   11/26/24 0700 130/70 -- -- 69 18 99 % --   11/26/24 0621 -- -- -- -- -- -- 282 lb 3 oz (128 kg)   11/26/24 0600 95/53 -- -- 68 21 97 % --   11/26/24 0500 121/67 -- -- 70 18 97 % --   11/26/24 0400 119/68 98.4 °F (36.9 °C) Temporal 72 18 98 % --   11/26/24 0200 109/60 -- -- 72 21 96 % --   11/26/24 0130 -- -- -- 72 18 97 % --   11/26/24 0100 142/73 -- -- 71 18 98 % --   11/26/24 0030 -- -- -- 71 18 99 % --   11/26/24 0000 139/70 98.3 °F (36.8 °C) Temporal 70 18 98 % --   11/25/24 2300 130/69 -- -- 72 18 98 % --   11/25/24 2200 135/70 -- -- 71 18 98 % --   11/25/24 2100 138/71 -- -- 71 18 99 % --   11/25/24 2000 132/68 98.1 °F (36.7 °C) Temporal 69 18 99 % --   11/25/24 1900 124/64 -- -- 71 18 98 % --   11/25/24 1800 136/72 -- -- 71 18 98 % --   11/25/24 1700 138/70 -- -- 70 18 98 % --       Intake/Output:   Last 3 shifts:   Intake/Output                   11/24/24 0700 - 11/25/24 0659 11/25/24 0700 - 11/26/24 0659 11/26/24 0700 - 11/27/24 0659       Intake    P.O.  0  0  --    P.O. 0 0 --    I.V.  2276.7  1884.9  --    I.V. 154 615 --    Volume (mL) Insulin 53.9 37 --    Volume (mL) Nitroglycerin 236.9 54.5 --    Volume (mL) Dobutamine 4.5 -- --    Volume (mL) Propofol 477.8 713.9 --    Volume (mL) Dexmedetomidine 352.2 384.5 --    Volume (mL) (dextrose 5%-sodium chloride 0.45% infusion) 997.4 80 --    NG/GT  50  150  60    Intake (mL) (NG/OG Tube Orogastric 16 Fr. Right mouth) 50 150 60    IV PIGGYBACK  600  500  --    Volume (mL) (piperacillin-tazobactam (Zosyn) 3.375 g in dextrose 5% 100 mL IVPB-ADDV) 300 200 --    Volume (mL) (acetaminophen (Ofirmev) 10 mg/mL infusion premix 1,000 mg) 200 100 --    Volume (mL) (potassium chloride 20 mEq/100mL IVPB premix 20 mEq) -- 100 --    Volume (mL) (potassium chloride 40 mEq/100mL IVPB premix (central line) 40 mEq) 100 100 --    Total Intake 2926.7 2534.9 60       Output    Urine  1800  3570  800    Output (mL) (Urethral  Catheter Latex;Temperature probe;Double-lumen) 1800 3570 800    Emesis/NG output  550  365  --    Residual volume (ml) (NG/OG Tube Orogastric 16 Fr. Right mouth) -- 5 --    Output (mL) (NG/OG Tube Orogastric 16 Fr. Right mouth) 550 360 --    Chest Tube  188  125  30    Output (mL) ([REMOVED] Chest Tube Anterior Mediastinal 32 Fr.) 48 50 --    Output (mL) (Chest Tube Anterior Pericardial 24 Fr.) 140 75 30    Total Output 2538 4060 830       Net I/O     388.7 -1525.1 -770             Vent Settings: Vent Mode: VC+  FiO2 (%):  [30 %] 30 %  S RR:  [18] 18  S VT:  [550 mL] 550 mL  PEEP/CPAP (cm H2O):  [5 cm H20] 5 cm H20  MAP (cm H2O):  [12-16] 16    Hemodynamic parameters (last 24 hours):      Scheduled Meds:    aspirin  81 mg Peg Tube Daily    senna  5 mL Per G Tube BID    QUEtiapine  25 mg Per G Tube BID    cloNIDine  0.3 mg Oral TID    Followed by    [START ON 12/7/2024] cloNIDine  0.3 mg Oral BID    docusate  100 mg Oral BID    sodium ferric gluconate  125 mg Intravenous Daily    isosorbide dinitrate  40 mg Per NG Tube TID (Nitrates)    [Held by provider] furosemide  40 mg Intravenous Daily    hydrALAZINE  150 mg Oral Q8H [facility]    carvedilol  6.25 mg Oral BID with meals    amLODIPine  10 mg Oral Daily    sodium chloride  3 mL Nebulization 4 times per day    albuterol  2.5 mg Nebulization 4 times per day    heparin  5,000 Units Subcutaneous Q8H GABRIEL    chlorhexidine gluconate  15 mL Mouth/Throat BID    famotidine  20 mg Intravenous Daily       Continuous Infusions:    niCARdipine 12 mg/hr (12/06/24 0958)    dexmedetomidine 1 mcg/kg/hr (12/06/24 1011)    propofol 25 mcg/kg/min (12/06/24 0848)       Results:     Lab Results   Component Value Date    WBC 9.5 12/06/2024    HGB 8.5 (L) 12/06/2024    HCT 26.3 (L) 12/06/2024    .0 12/06/2024    CREATSERUM 1.46 (H) 12/06/2024    BUN 38 (H) 12/06/2024     12/06/2024    K 3.6 12/06/2024     (H) 12/06/2024    CO2 17.0 (L) 12/06/2024     (H) 12/06/2024     CA 8.8 12/06/2024    ALB 3.4 12/06/2024    ALKPHO 86 12/05/2024    BILT 0.4 12/05/2024    TP 6.3 12/05/2024    AST 31 12/05/2024    ALT 33 12/05/2024    PTT 30.7 12/04/2024    INR 1.17 12/04/2024    TSH 0.704 09/29/2024    NATHALIE 99 11/25/2024    LIP 28 11/25/2024    DDIMER 0.42 12/08/2019    ESRML 15 06/05/2021    MG 2.7 (H) 12/06/2024    PHOS 3.6 12/06/2024    TROP <0.045 12/08/2019     (H) 09/23/2021    B12 1,156 (H) 07/23/2018       Recent Labs   Lab 12/04/24  1728 12/05/24  0416 12/06/24  0534   * 120* 119*   BUN 44* 41* 38*   CREATSERUM 1.78* 1.61* 1.46*   CA 9.2 9.1 8.8    142 145   K 4.5 4.0 3.6   * 114* 116*   CO2 17.0* 17.0* 17.0*     Recent Labs   Lab 12/03/24  0312 12/04/24  0510 12/04/24  1728 12/05/24  0416 12/06/24  0534   RBC 3.02* 2.89* 3.16* 3.17* 3.14*   HGB 8.0* 7.7* 8.7* 9.0* 8.5*   HCT 25.3* 24.2* 26.8* 26.3* 26.3*   MCV 83.8 83.7 84.8 83.0 83.8   MCH 26.5 26.6 27.5 28.4 27.1   MCHC 31.6 31.8 32.5 34.2 32.3   RDW 16.5* 16.6* 16.9* 16.5* 16.7*   NEPRELIM 7.99* 8.52*  --   --  6.70   WBC 11.5* 11.9* 13.4* 12.2* 9.5   .0 238.0 216.0 224.0 264.0       No results for input(s): \"BNPML\" in the last 168 hours.    No results for input(s): \"TROP\", \"CK\" in the last 168 hours.    XR CHEST AP PORTABLE  (CPT=71045)    Result Date: 12/5/2024  CONCLUSION:   No significant change when compared to 12/04/2024.    Dictated by (CST): Panchito Shahid MD on 12/05/2024 at 8:35 AM     Finalized by (CST): Panchito Shahid MD on 12/05/2024 at 8:38 AM          XR CHEST AP PORTABLE  (CPT=71045)    Result Date: 12/4/2024  CONCLUSION:  1. Mild cardiomegaly and minimally prominent pulmonary vascularity but no huang pulmonary edema.  ET tube and NG tubes have been removed.  Tracheostomy is now noted in the trachea extending to the level the clavicles.  No pneumothorax. 2. Persistent patchy bilateral upper lobe airspace disease right more than left suggesting persistent bilateral upper lobe  pneumonia.  Correlate clinically and continued follow-up is advised.    Dictated by (CST): Adalberto Santos MD on 12/04/2024 at 9:39 AM     Finalized by (CST): Adalberto Santos MD on 12/04/2024 at 9:41 AM          XR CHEST AP PORTABLE  (CPT=71045)    Result Date: 12/4/2024  CONCLUSION:  1. Endotracheal and enteric tubes are in stable customary positioning. 2. New right-sided PICC is in customary positioning.  No pneumothorax. 3. Stable patchy airspace opacities in both lungs compatible with multifocal pneumonia.    Dictated by (CST): Nathanael Hernandez MD on 12/04/2024 at 8:01 AM     Finalized by (CST): Nathanael Hernandez MD on 12/04/2024 at 8:05 AM                    Exam:     Physical Exam:  General: awake/alert  HEENT: +trach  Neck: No JVD, carotids 2+, no bruits.  Cardiac: Regular rate and rhythm. S1, S2 normal.   Lungs: +rhonchi   Abdomen: Soft, non-tender.BS-present.  Extremities: Without clubbing or cyanosis. + BLE edema.    Neurologic: unable to obtain  Skin: Warm and dry.     Edward Montgomery, [facility] [place]

## 2024-12-06 NOTE — PROGRESS NOTES
[facility]  Nephrology Daily Progress Note    Alisha Wilde  [id number]  59 year old      HPI:   Alisha Wilde is a 59 year old female.  On trach.  Sedated.  FiO2 30%.        ROS:     Constitutional:  Negative for decreased activity, fever, irritability and lethargy  Endocrine:  Negative for abnormal sleep patterns, increased activity, polydipsia and polyphagia  Cardiovascular:  Negative for cool extremity and irregular heartbeat/palpitations  Gastrointestinal:  Negative for abdominal pain, constipation, decreased appetite, diarrhea and vomiting  Genitourinary:  Negative for dysuria and hematuria  Hema/Lymph:  Negative for easy bleeding and easy bruising  Integumentary:  Negative for pruritus and rash  Musculoskeletal:  Negative for bone/joint symptoms  Neurological:  Negative for gait disturbance  Psychiatric:  Negative for inappropriate interaction and psychiatric symptoms  Respiratory:  Negative for cough, dyspnea and wheezing      PHYSICAL EXAM:   Temp:  [98 °F (36.7 °C)-99.1 °F (37.3 °C)] 98.4 °F (36.9 °C)  Pulse:  [70-85] 73  Resp:  [3-23] 18  BP: (131-164)/(58-68) 135/61  SpO2:  [99 %-100 %] 100 %  FiO2 (%):  [30 %] 30 %  Patient Weight for the past 72 hrs:   Weight   12/04/24 0322 291 lb 0.1 oz (132 kg)   12/05/24 0426 282 lb 3 oz (128 kg)   12/06/24 0551 285 lb 4.4 oz (129.4 kg)       Constitutional: appears well hydrated alert and responsive no acute distress noted  Neck/Thyroid: neck is supple without adenopathy  Lymphatic: no abnormal cervical, supraclavicular or axillary adenopathy is noted  Respiratory: normal to inspection lungs are clear to auscultation bilaterally normal respiratory effort  Cardiovascular: regular rate and rhythm no murmurs, gallups, or rubs  Abdomen: soft non-tender non-distended no organomegaly noted no masses  Musculoskeletal: full ROM all extremities good strength  no deformities  Extremities: + edema.   Neurological: exam appropriate for age reflexes and motor  skills appropriate for age    Labs:  Lab Results   Component Value Date    WBC 9.5 12/06/2024    HGB 8.5 12/06/2024    HCT 26.3 12/06/2024    .0 12/06/2024    CREATSERUM 1.46 12/06/2024    BUN 38 12/06/2024     12/06/2024    K 3.6 12/06/2024     12/06/2024    CO2 17.0 12/06/2024     12/06/2024    CA 8.8 12/06/2024    ALB 3.4 12/06/2024    MG 2.7 12/06/2024    PHOS 3.6 12/06/2024     Recent Labs   Lab 12/04/24  1728 12/05/24  0416 12/06/24  0534   WBC 13.4* 12.2* 9.5   HGB 8.7* 9.0* 8.5*   HCT 26.8* 26.3* 26.3*   .0 224.0 264.0     Recent Labs   Lab 12/03/24  0312 12/04/24  0510 12/04/24  1728 12/05/24  0416 12/06/24  0534   *  130* 128* 118* 120* 119*   BUN 33*  33* 42* 44* 41* 38*   CREATSERUM 1.60*  1.60* 1.65* 1.78* 1.61* 1.46*   CA 8.9  8.9 9.0 9.2 9.1 8.8   ALB 3.4 3.5  --  3.6 3.4     140 139 141 142 145   K 4.5  4.5 4.4 4.5 4.0 3.6   *  113* 111 114* 114* 116*   CO2 19.0*  19.0* 17.0* 17.0* 17.0* 17.0*   ALKPHO 86  --   --  86  --    AST 25  --   --  31  --    ALT 32  --   --  33  --    BILT 0.3  --   --  0.4  --    TP 6.3  --   --  6.3  --    PHOS 5.9* 5.3*  --   --  3.6       Imaging  XR CHEST AP PORTABLE  (CPT=71045)    Result Date: 12/5/2024  CONCLUSION:   No significant change when compared to 12/04/2024.    Dictated by (CST): Panchito Shahid MD on 12/05/2024 at 8:35 AM     Finalized by (CST): Panchito Shahid MD on 12/05/2024 at 8:38 AM          XR CHEST AP PORTABLE  (CPT=71045)    Result Date: 12/4/2024  CONCLUSION:  1. Mild cardiomegaly and minimally prominent pulmonary vascularity but no huang pulmonary edema.  ET tube and NG tubes have been removed.  Tracheostomy is now noted in the trachea extending to the level the clavicles.  No pneumothorax. 2. Persistent patchy bilateral upper lobe airspace disease right more than left suggesting persistent bilateral upper lobe pneumonia.  Correlate clinically and continued follow-up is advised.     Dictated by (CST): Adalberto Santos MD on 12/04/2024 at 9:39 AM     Finalized by (CST): Adalberto Santos MD on 12/04/2024 at 9:41 AM          XR CHEST AP PORTABLE  (CPT=71045)    Result Date: 12/4/2024  CONCLUSION:  1. Endotracheal and enteric tubes are in stable customary positioning. 2. New right-sided PICC is in customary positioning.  No pneumothorax. 3. Stable patchy airspace opacities in both lungs compatible with multifocal pneumonia.    Dictated by (CST): Nathanael Hernandez MD on 12/04/2024 at 8:01 AM     Finalized by (CST): Nathanael Hernandez MD on 12/04/2024 at 8:05 AM             Medications:    Current Facility-Administered Medications:     aspirin chewable tab 81 mg, 81 mg, Peg Tube, Daily    carvedilol (Coreg) tab 25 mg, 25 mg, Oral, BID with meals    QUEtiapine (SEROquel) tab 50 mg, 50 mg, Per G Tube, BID    niCARdipine (carDENE) 125 mg in sodium chloride 0.9% 250 mL infusion, 5-15 mg/hr, Intravenous, Continuous    senna (Senokot) 8.8 MG/5ML oral syrup 8.8 mg, 5 mL, Per G Tube, BID    [COMPLETED] cloNIDine (Catapres) tab 0.3 mg, 0.3 mg, Oral, QID **FOLLOWED BY** cloNIDine (Catapres) tab 0.3 mg, 0.3 mg, Oral, TID **FOLLOWED BY** [START ON 12/7/2024] cloNIDine (Catapres) tab 0.3 mg, 0.3 mg, Oral, BID    hydrALAzine (Apresoline) 20 mg/mL injection 10 mg, 10 mg, Intravenous, Q6H PRN    docusate (Colace) 50 MG/5ML oral solution 100 mg, 100 mg, Oral, BID    sodium ferric gluconate (Ferrlecit) 125 mg in sodium chloride 0.9% 100mL IVPB premix, 125 mg, Intravenous, Daily    isosorbide dinitrate (Isordil) tab 40 mg, 40 mg, Per NG Tube, TID (Nitrates)    [Held by provider] furosemide (Lasix) 10 mg/mL injection 40 mg, 40 mg, Intravenous, Daily    hydrALAZINE (Apresoline) tab 150 mg, 150 mg, Oral, Q8H GABRIEL    amLODIPine (Norvasc) tab 10 mg, 10 mg, Oral, Daily    sodium chloride 3 % nebulizer solution 3 mL, 3 mL, Nebulization, 4 times per day    ipratropium-albuterol (Duoneb) 0.5-2.5 (3) MG/3ML inhalation solution 3 mL,  3 mL, Nebulization, Q6H PRN    albuterol (Ventolin) (2.5 MG/3ML) 0.083% nebulizer solution 2.5 mg, 2.5 mg, Nebulization, 4 times per day    HYDROcodone-acetaminophen (Norco) 5-325 MG per tab 2 tablet, 2 tablet, Oral, Q4H PRN    heparin (Porcine) 5000 UNIT/ML injection 5,000 Units, 5,000 Units, Subcutaneous, Q8H GABRIEL    melatonin tab 3 mg, 3 mg, Oral, Nightly PRN    bisacodyl (Dulcolax) 10 MG rectal suppository 10 mg, 10 mg, Rectal, Daily PRN    dexmedeTOMIDine in sodium chloride 0.9% (Precedex) 400 mcg/100mL infusion premix, 0.2-1.5 mcg/kg/hr (Dosing Weight), Intravenous, Continuous    potassium chloride 20 mEq/100mL IVPB premix 20 mEq, 20 mEq, Intravenous, PRN **OR** potassium chloride 40 mEq/100mL IVPB premix (central line) 40 mEq, 40 mEq, Intravenous, PRN    magnesium sulfate in dextrose 5% 1 g/100mL infusion premix 1 g, 1 g, Intravenous, PRN    magnesium sulfate in sterile water for injection 2 g/50mL IVPB premix 2 g, 2 g, Intravenous, PRN    chlorhexidine gluconate (Peridex) 0.12 % oral solution 15 mL, 15 mL, Mouth/Throat, BID    morphINE PF 2 MG/ML injection 2 mg, 2 mg, Intravenous, Q2H PRN **OR** [DISCONTINUED] morphINE PF 4 MG/ML injection 4 mg, 4 mg, Intravenous, Q2H PRN    propofol (Diprivan) 10 mg/mL infusion premix, 5-50 mcg/kg/min (Dosing Weight), Intravenous, Continuous    fentaNYL (Sublimaze) 50 mcg/mL injection 25 mcg, 25 mcg, Intravenous, Q3H PRN    [DISCONTINUED] famotidine (Pepcid) tab 20 mg, 20 mg, Oral, Daily **OR** famotidine (Pepcid) 20 mg/2mL injection 20 mg, 20 mg, Intravenous, Daily    Allergies:  Allergies[1]    Input/Output:    Intake/Output Summary (Last 24 hours) at 12/6/2024 1534  Last data filed at 12/6/2024 1500  Gross per 24 hour   Intake 2673.26 ml   Output 1450 ml   Net 1223.26 ml          ASSESSMENT/PLAN:   Assessment   Patient Active Problem List   Diagnosis    Hypercholesteremia    Alcoholism (HCC)    Essential hypertension    Vitamin D deficiency    Adjustment disorder with  mixed anxiety and depressed mood    Controlled type 2 diabetes mellitus without complication, without long-term current use of insulin (HCC)    Obesity (BMI 30-39.9)    Neck mass    Polyp of colon    Flat feet, bilateral    Hypokalemia    Myalgia due to statin    Age-related nuclear cataract of both eyes    Positive for microalbuminuria    Dissection of ascending aorta (HCC)    NAIMA (acute kidney injury) ([facility])    Stage 3 chronic kidney disease ([facility])    Acute respiratory failure with hypoxia ([facility])       UO 2200 ml.  Creatinine down to 1.46.  Continue Lasix 40 mg every day.  Replace K.  Tube feedings to start later today. Discussed with RN.              12/6/2024  Juan Franco MD               [1]   Allergies  Allergen Reactions    Atorvastatin MYALGIA    Pravastatin MYALGIA    Rosuvastatin MYALGIA    Seasonal Runny nose

## 2024-12-06 NOTE — PROGRESS NOTES
[facility]     Gastroenterology Progress Note    Alisha Wilde Patient Status:  Inpatient    10/25/1965 MRN [id number]   Location [facility] 2W/SW Attending Missy Paulson MD   Hosp Day # 14 PCP Bridger Avendano MD       Subjective:   No fever, brown stool. No emesis.     Objective:   Blood pressure 141/61, pulse 75, temperature 99.1 °F (37.3 °C), resp. rate 19, height 5' 5\" (1.651 m), weight 285 lb 4.4 oz (129.4 kg), SpO2 100%, not currently breastfeeding. Body mass index is 47.47 kg/m².    General: trach, sedated   HEENT: moist mucus membranes  PULM: diminished breath sounds  CARDIOVASCULAR: regular rate and rhythm, the extremities are warm and well perfused  GI: soft, non-tender, non-distended, + BS, no rebound/guarding   EXTREMITIES: trace edema  SKIN: no visible rash  NEURO:sedated    Assessment and Plan:   Alisha Wilde is a 59 year old female w/ PMHx of hypertension, GERD, CKD, hyperlipidemia who presented to ER 2024 for chest pain. Underwent emergent repair of aortic type A dissection on 2024. Respiratory failure led to recommendation of trach/peg. PEG placed yesterday. External bumper was loosened this morning to final position 4.5 cm, in satisfactory final position.     Ok to use peg for meds and enteral nutrition. To ensure patency, flush the tube with 100ml of water q 8 hours.     GI will be available as needed, please call with questions    Toma Barrientos MD  [facility] Gastroenterology      Results:     Lab Results   Component Value Date    WBC 9.5 2024    HGB 8.5 (L) 2024    HCT 26.3 (L) 2024    .0 2024    CREATSERUM 1.46 (H) 2024    BUN 38 (H) 2024     2024    K 3.6 2024     (H) 2024    CO2 17.0 (L) 2024     (H) 2024    CA 8.8 2024    ALB 3.4 2024    ALKPHO 86 2024    BILT 0.4 2024    TP 6.3 2024    AST 31 2024    ALT 33 2024     PTT 30.7 12/04/2024    INR 1.17 12/04/2024    TSH 0.704 09/29/2024    NATHALIE 99 11/25/2024    LIP 28 11/25/2024    DDIMER 0.42 12/08/2019    ESRML 15 06/05/2021    MG 2.7 (H) 12/06/2024    PHOS 3.6 12/06/2024    TROP <0.045 12/08/2019     (H) 09/23/2021    B12 1,156 (H) 07/23/2018       XR CHEST AP PORTABLE  (CPT=71045)    Result Date: 12/5/2024  CONCLUSION:   No significant change when compared to 12/04/2024.    Dictated by (CST): Panchito Shahid MD on 12/05/2024 at 8:35 AM     Finalized by (CST): Panchito Shahid MD on 12/05/2024 at 8:38 AM

## 2024-12-06 NOTE — PROGRESS NOTES
Pulmonary/ICU/Critical Care Progress Note        Reason for Consultation: post op care  Referring Physician: Dr. Gregg    Seen this am.     SUBJECTIVE:  Afebrile  S/p PEG yesterday  Unable to wean sedation-becomes agitated  Popped blister in mouth on tongue and had oral bleeding  Started on seroquel and clonidine in attempt to wean precedex and propofol      ALLERGIES:  Allergies[1]        MEDS:  Home Medications:  Medications Taking[2]    Scheduled Medication:   aspirin  81 mg Peg Tube Daily    carvedilol  25 mg Oral BID with meals    senna  5 mL Per G Tube BID    QUEtiapine  25 mg Per G Tube BID    cloNIDine  0.3 mg Oral TID    Followed by    [START ON 12/7/2024] cloNIDine  0.3 mg Oral BID    docusate  100 mg Oral BID    sodium ferric gluconate  125 mg Intravenous Daily    isosorbide dinitrate  40 mg Per NG Tube TID (Nitrates)    [Held by provider] furosemide  40 mg Intravenous Daily    hydrALAZINE  150 mg Oral Q8H GABRIEL    amLODIPine  10 mg Oral Daily    sodium chloride  3 mL Nebulization 4 times per day    albuterol  2.5 mg Nebulization 4 times per day    heparin  5,000 Units Subcutaneous Q8H GABRIEL    chlorhexidine gluconate  15 mL Mouth/Throat BID    famotidine  20 mg Intravenous Daily     Continuous Infusing Medication:   niCARdipine 12 mg/hr (12/06/24 0958)    dexmedetomidine 1 mcg/kg/hr (12/06/24 1011)    propofol 25 mcg/kg/min (12/06/24 0848)     PRN Medications:    hydrALAzine    ipratropium-albuterol    HYDROcodone-acetaminophen    melatonin    bisacodyl    potassium chloride **OR** potassium chloride    magnesium sulfate in dextrose 5%    magnesium sulfate in sterile water for injection    morphINE **OR** [DISCONTINUED] morphINE    fentaNYL       PHYSICAL EXAM:  /61   Pulse 75   Temp 99.1 °F (37.3 °C)   Resp 19   Ht 5' 5\" (1.651 m)   Wt 285 lb 4.4 oz (129.4 kg)   SpO2 100%   BMI 47.47 kg/m²   Vent Mode: VC+  FiO2 (%):  [30 %] 30 %  S RR:  [18] 18  S VT:  [550 mL] 550 mL  PEEP/CPAP (cm H2O):  [5  cm H20] 5 cm H20  MAP (cm H2O):  [12-16] 15  CONSTITUTIONAL: intubated, sedated but opens eyes  HEENT: atraumatic normocephalic  MOUTH: MMM, large tongue  NECK/THROAT: no JVD. Trachea midline. No obvious thyromegaly. + trach with min bleeding   LUNG: vented upper clear BS b/l no wheezing, + crackles. Diminished at bases. Chest symmetric with respiratory motion. + midsternal surgical wound healing   HEART: regular rate and rhythm, no obvious murmers or gallops noted  ABD: soft non tender. + bowel sounds. No organomegaly noted. + PEG tube   EXT: no clubbing, cyanosis, or edema noted. Pulses intact grossly  NEURO/MUSCULOSKELETAL: intubated sedated but opens eyes   SKIN: warm, dry. No obvious lesions noted  LYMPH: no obvious LAD      IMAGES:   CXR 12/5/24  No significant change when compared to 12/04/2024.     CXR 12/4/24  CONCLUSION:   1. Mild cardiomegaly and minimally prominent pulmonary vascularity but no huang pulmonary edema.  ET tube and NG tubes have been removed.  Tracheostomy is now noted in the trachea extending to the level the clavicles.  No pneumothorax.   2. Persistent patchy bilateral upper lobe airspace disease right more than left suggesting persistent bilateral upper lobe pneumonia.  Correlate clinically and continued follow-up is advised.     CXR 12/2/24  FINDINGS:   POSITION: The patient is semi-erect and slightly rotated to the right.   DEVICES: There is an endotracheal tube terminating approximately 3.9 cm above the jennyfer.  A large-bore right internal jugular central venous vascular access sheath has tip projecting in the SVC. An enteric tube extends caudally beyond the field of view.   CARDIAC/VASC: The cardiomediastinal silhouette is accentuated by AP technique, but likely stably enlarged. There is mild tortuosity of the thoracic aorta with peripheral atherosclerotic vascular calcification. The pulmonary vascularity is within normal   limits.   MEDIAST/HAMLET: Surgical clips are present.    LUNGS/PLEURA: Elevation right hemidiaphragm is noted. Multifocal patchy airspace consolidation is demonstrated, slightly worse on the right side than left. Mild interstitial opacities are seen. Additional scattered reticular opacities may be atelectatic   in nature. No large pleural effusion or pneumothorax is evident.    BONES: Median sternotomy wires are demonstrated. Mild degenerative changes of the thoracic spine are apparent. There is no fracture or visible bony lesion.   OTHER: There may be surgical clips at the level of the thoracic inlet. Leads overlie the chest and obscure underlying detail     CXR 11/27/24  Moderate pulmonary edema, progressed since 11/26/2024.     CT c/a/p  CONCLUSION:  Low-lying ETT, 0.8 cm above the jennyfer   Multifocal bilateral pulmonary nodular consolidation s    CXR 11/24/24  FINDINGS:   CARDIAC/VASC: The cardiac silhouette is exaggerated by AP portable technique. There is stable central pulmonary venous congestion.   MEDIAST/HAMLET:   No visible mass or adenopathy.   LUNGS/PLEURA: There are stable streaky opacities at the right lung base.  No pleural effusion or pneumothorax.   BONES: Sternotomy changes are again noted.   OTHER: An endotracheal tube tip projects 3.8 cm above the jennyfer.  An enteric tube again courses into the left upper quadrant.  A right internal jugular approach [place]-Dev catheter tip again projects over the pulmonary outflow tract.  A mediastinal drain tip again projects over the right suprahilar region.     CXR 11/23/24  On my read possible RML infiltrates  CONCLUSION: Post emergent acute type A aortic dissection repair.  Stable cardiomegaly.  Gross stable/satisfactory position of lines and tubes.  Mild pulmonary vascular congestion.       CXR 11/22/24  CONCLUSION: Post feeding tube placement with tip in the distal esophagus approximately 4 cm above the gastroesophageal junction.  Recommend advancement of the tube by approximately 10 cm to place it in the  stomach.     CXR 11/22/24  CARDIAC/MEDIASTINUM: The cardiac silhouette is enlarged and unchanged.. There is a right internal jugular [place]-Dev catheter with tip at the level of the main pulmonary artery. There is a right-sided chest tube. Endotracheal tube with tip approximately    5.3 cm above the jennyfer. There is a nasogastric tube with tip and sidehole within the stomach and below the diaphragm. There are median sternotomy wires and postoperative changes of ascending aortic repair.   LUNGS: There is pulmonary vascular redistribution without edema. Minimal blunting of the bilateral costophrenic angles may be secondary to trace pleural effusions versus chronic pleural thickening. There is mild bibasilar atelectasis. No pneumothorax.   BONES: There is degenerative disease of the thoracic spine.     Chest CTA 11/22/24  CONCLUSION:   1. Type a aortic dissection beginning just above right renal (correction:  Right coronary)  artery and extending distally into the left common iliac artery and external iliac.  Findings were called to Dr. Echavarria at 5:52 p.m.       LABS:  Recent Labs   Lab 12/03/24 0312 12/04/24  0510 12/04/24 1728 12/05/24 0416 12/06/24  0534   RBC 3.02* 2.89* 3.16* 3.17* 3.14*   HGB 8.0* 7.7* 8.7* 9.0* 8.5*   HCT 25.3* 24.2* 26.8* 26.3* 26.3*   MCV 83.8 83.7 84.8 83.0 83.8   MCH 26.5 26.6 27.5 28.4 27.1   MCHC 31.6 31.8 32.5 34.2 32.3   RDW 16.5* 16.6* 16.9* 16.5* 16.7*   NEPRELIM 7.99* 8.52*  --   --  6.70   WBC 11.5* 11.9* 13.4* 12.2* 9.5   .0 238.0 216.0 224.0 264.0       Recent Labs   Lab 12/03/24  0312 12/04/24  0510 12/04/24  1728 12/05/24  0416 12/06/24  0534   *  130* 128* 118* 120* 119*   BUN 33*  33* 42* 44* 41* 38*   CREATSERUM 1.60*  1.60* 1.65* 1.78* 1.61* 1.46*   EGFRCR 36*  36* 35* 32* 36* 41*   CA 8.9  8.9 9.0 9.2 9.1 8.8   ALB 3.4 3.5  --  3.6 3.4     140 139 141 142 145   K 4.5  4.5 4.4 4.5 4.0 3.6   *  113* 111 114* 114* 116*   CO2 19.0*  19.0*  17.0* 17.0* 17.0* 17.0*   ALKPHO 86  --   --  86  --    AST 25  --   --  31  --    ALT 32  --   --  33  --    BILT 0.3  --   --  0.4  --    TP 6.3  --   --  6.3  --        ASSESSMENT/PLAN:  Type A aortic dissection s/p repair now intubated in ICU  -on nicardipine gtt as per CV surgery  -oral antiHTN meds restarted since now has PEG  and ok to use by GI  -s/p pRBC transfusion on 12/4 with repeat hg stable    Post op acute respiratory failure  -complicated by extubation and reimergent intubation and significant emesis concerning for aspiration PNA vs pneumonitis  -started on zosyn-completed 10 day course  -checked ETT asp-candida likely contaminant  -failed multiple SBTs d/t increased RR, work of breathing, hypertension, hypoxemia, significant thick secretions  -hx of likely TANYA and previous smoking hx. Has sign dense consolidations and atelectasis on chest CT.  -s/p trach by ENT on 12/4/24 and PEG by GI on 12/5/24  -start trach/vent weaning. Tolerated 8/5 SBT yesterday for 30 min yesterday  -PRN ABG and CXR  -saline nebs  -on propofol and precedex-having difficulty weaning as pt becomes agitated and hypertensive. Started seroquel 25 mg BID. Will increase to 50 mg bid today. Continue scheduled clonidine in attempt to wean off precedex    Previous hx of smoking and ETOH  -continue duonebs PRN    NAIMA on CKD and metabolic acidosis  -likely from surgery, and acute issues  -monitor UO and renal labs  -renal following     Abd pain  -s/p abd CT as above  -resolved    Proph:  -DVT: hep sq    Dispo:  -full code  -SW/ to assist with LTAC placement    Critical care time 32 min independent of procedures      Thank you for the opportunity to care for Alishaedith Wilde.      SIDDHARTH Rainey DO, MPH  Pulmonary Critical Care Medicine  [facility]                       [1]   Allergies  Allergen Reactions    Atorvastatin MYALGIA    Pravastatin MYALGIA    Rosuvastatin MYALGIA    Seasonal  Runny nose   [2]   Outpatient Medications Marked as Taking for the 11/21/24 encounter (Hospital Encounter)   Medication Sig Dispense Refill    Acetaminophen ER (TYLENOL 8 HOUR) 650 MG Oral Tab CR Take 2 tablets (1,300 mg total) by mouth daily as needed.      Calcium Carb-Cholecalciferol 600-10 MG-MCG Oral Tab Take 1 tablet by mouth daily.      metoprolol succinate ER 50 MG Oral Tablet 24 Hr Take 1 tablet (50 mg total) by mouth daily. 90 tablet 3    Losartan Potassium-HCTZ 100-12.5 MG Oral Tab Take 1 tablet by mouth daily. 90 tablet 3    Potassium Chloride ER 20 MEQ Oral Tab CR Take 1 tablet by mouth daily. 90 tablet 3    albuterol 108 (90 Base) MCG/ACT Inhalation Aero Soln Inhale 2 puffs into the lungs every 4 (four) hours as needed for Wheezing. 3 each 3    amLODIPine 10 MG Oral Tab Take 1 tablet (10 mg total) by mouth daily. 90 tablet 3    Ascorbic Acid (VITAMIN C) 1000 MG Oral Tab Take 1 tablet (1,000 mg total) by mouth daily.      vitamin B-12 50 MCG Oral Tab Take 1 tablet (50 mcg total) by mouth daily.

## 2024-12-06 NOTE — PROGRESS NOTES
[facility]  part of [facility]    Progress Note    Alisha Wilde Patient Status:  Inpatient    10/25/1965 MRN [id number]   Location [facility] 2W/SW Attending Missy Paulson MD   Hosp Day # 14 PCP Bridger Avendano MD     Subjective:  Pt lightly sedated and currently on full vent support. She opens eyes to verbal stimuli. At this time she is calm and able to follow some simple commands. Does weakly squeeze hands bilaterally. Intermittently nods head to simple questions. No visitors at bedside.     Objective:  BP (!) 164/68 (BP Location: Left arm)   Pulse 84   Temp 99.1 °F (37.3 °C)   Resp 18   Ht 5' 5\" (1.651 m)   Wt 285 lb 4.4 oz (129.4 kg)   SpO2 100%   BMI 47.47 kg/m²     Temp (24hrs), Av.4 °F (36.9 °C), Min:98 °F (36.7 °C), Max:99.1 °F (37.3 °C)      Intake/Output:    Intake/Output Summary (Last 24 hours) at 2024 0827  Last data filed at 2024 0800  Gross per 24 hour   Intake 2408.65 ml   Output 2200 ml   Net 208.65 ml       Wt Readings from Last 3 Encounters:   24 285 lb 4.4 oz (129.4 kg)   10/24/24 254 lb (115.2 kg)   24 249 lb (112.9 kg)       Allergies:  Allergies[1]    Labs:  Lab Results   Component Value Date    WBC 9.5 2024    HGB 8.5 2024    HCT 26.3 2024    .0 2024    CREATSERUM 1.46 2024    BUN 38 2024     2024    K 3.6 2024     2024    CO2 17.0 2024     2024    CA 8.8 2024    ALB 3.4 2024    MG 2.7 2024    PHOS 3.6 2024       Physical Exam:  Blood pressure (!) 164/68, pulse 84, temperature 99.1 °F (37.3 °C), resp. rate 18, height 5' 5\" (1.651 m), weight 285 lb 4.4 oz (129.4 kg), SpO2 100%, not currently breastfeeding.  General: Tongue decreased in size today  Neck: Trach in place  Lungs: faintly coarse bilaterally anteriorly   Heart: RRR, S1, S2  Abdomen: Soft/Obese, NT/ND, BS+x 4/+BM (small BM overnight)  Extremities: Warm,  dry, Generalized 1-2+ UE & LE edema bilat  Pulses: 2+ bilat DP  Skin: sternotomy incision CATHIE=CDI-healing well  Neurological:  opens eyes to verbal stimuli/able to squeeze hands bilaterally-very weak    Assessment/Plan:  S/P EMERGENT AORTIC DISSECTION REPAIR POD # 14  Respiratory Failure w/multiple failed SBT prior to trach placement -currently on full vent support-continue daily weaning trials. Re-intubated on 11/22 per Pulm after emesis episode/ETT removal due to emesis content noted in airway.    S/P Trach placement per ENT on 12/4  Completed ABX course for suspected aspiration pneumonia.   CT Chest/Abdomen/Pelvis (non-contrast) 11/26=no obstruction noted/stable. +BM (s) post op  Hx: HTN: on multiple antihypertensives including IV Cardene-slowly able to wean: SBP goal= <130/MAP >70. Mgt per Cards. New A-line placed 12/2  New PICC placed 12/2  Pain meds as needed  Scds/Heparin SubQ prophylaxis DVT prevention. HIPA 11/25=negative. Plts continue >100,000  Continue ICU status  Hx: CKD. Expected post op volume overload w/mgt per Nephrology (consulted on 11/23) for NAIMA. Limit/avoid nephrotoxic meds: Lasix PRN per Nephrology. Webb remains for close I/O monitoring & immobility.  Expected post op anemia: w/o clinical significance/stable. May be slightly diluted due to volume overload.   Hx: Morbid obesity w/BMI >40-plan for RD to see when appropriate  Nutrition per TF via PEG: anticipate able to resume today after GI eval. PEG placed 12/5 per GI.    + BM overnight: continue Senna & Colace. Reglan stopped yesterday due to drug interaction. Abdominal X-ray 12/4=Gas  Hx: ETOH abuse-monitor for withdrawals. CIWA protocol PRN   Discharge planning: pt lives alone and has supportive family.   Anticipate LTAC at discharge: referrals sent proactively by  on 11/29 for LTAC. Tentatively ready once BP improves.      Plan of care discussed w/RN and CV Surgeon: Dr. Marysol Noel, APRN  12/6/2024  8:27 AM       [1]    Allergies  Allergen Reactions    Atorvastatin MYALGIA    Pravastatin MYALGIA    Rosuvastatin MYALGIA    Seasonal Runny nose

## 2024-12-06 NOTE — PLAN OF CARE
Problem: RESPIRATORY - ADULT  Goal: Achieves optimal ventilation and oxygenation  Description: INTERVENTIONS:  - Assess for changes in respiratory status  - Assess for changes in mentation and behavior  - Position to facilitate oxygenation and minimize respiratory effort  - Oxygen supplementation based on oxygen saturation or ABGs  - Provide Smoking Cessation handout, if applicable  - Encourage broncho-pulmonary hygiene including cough, deep breathe, Incentive Spirometry  - Assess the need for suctioning and perform as needed  - Assess and instruct to report SOB or any respiratory difficulty  - Respiratory Therapy support as indicated  - Manage/alleviate anxiety  - Monitor for signs/symptoms of CO2 retention  Outcome: Progressing   Patient received with tracheostomy and on ventilator VC+ 18/550/+5/30%. Tracheostomy care provided. Bilateral breath sounds auscultated. Suction provided when indicated. Patient was placed on PS trial at 1535 initially at 8/5 for 30 minutes and 5/5 for another 30 minutes. Patient tolerated trial well and was returned to full support at 1645. No acute events. RT will continue to monitor.

## 2024-12-07 LAB
ALBUMIN SERPL-MCNC: 3.3 G/DL (ref 3.2–4.8)
ANION GAP SERPL CALC-SCNC: 10 MMOL/L (ref 0–18)
BASOPHILS # BLD AUTO: 0.06 X10(3) UL (ref 0–0.2)
BASOPHILS NFR BLD AUTO: 0.7 %
BUN BLD-MCNC: 36 MG/DL (ref 9–23)
BUN/CREAT SERPL: 25.5 (ref 10–20)
CALCIUM BLD-MCNC: 8.8 MG/DL (ref 8.7–10.4)
CHLORIDE SERPL-SCNC: 117 MMOL/L (ref 98–112)
CO2 SERPL-SCNC: 18 MMOL/L (ref 21–32)
CREAT BLD-MCNC: 1.41 MG/DL
DEPRECATED RDW RBC AUTO: 54.4 FL (ref 35.1–46.3)
EGFRCR SERPLBLD CKD-EPI 2021: 42 ML/MIN/1.73M2 (ref 60–?)
EOSINOPHIL # BLD AUTO: 0.45 X10(3) UL (ref 0–0.7)
EOSINOPHIL NFR BLD AUTO: 5.3 %
ERYTHROCYTE [DISTWIDTH] IN BLOOD BY AUTOMATED COUNT: 17.2 % (ref 11–15)
GLUCOSE BLD-MCNC: 113 MG/DL (ref 70–99)
GLUCOSE BLDC GLUCOMTR-MCNC: 112 MG/DL (ref 70–99)
GLUCOSE BLDC GLUCOMTR-MCNC: 130 MG/DL (ref 70–99)
GLUCOSE BLDC GLUCOMTR-MCNC: 131 MG/DL (ref 70–99)
HCT VFR BLD AUTO: 26.2 %
HGB BLD-MCNC: 8.4 G/DL
IMM GRANULOCYTES # BLD AUTO: 0.35 X10(3) UL (ref 0–1)
IMM GRANULOCYTES NFR BLD: 4.1 %
LYMPHOCYTES # BLD AUTO: 1.17 X10(3) UL (ref 1–4)
LYMPHOCYTES NFR BLD AUTO: 13.8 %
MAGNESIUM SERPL-MCNC: 2.5 MG/DL (ref 1.6–2.6)
MCH RBC QN AUTO: 28 PG (ref 26–34)
MCHC RBC AUTO-ENTMCNC: 32.1 G/DL (ref 31–37)
MCV RBC AUTO: 87.3 FL
MONOCYTES # BLD AUTO: 0.86 X10(3) UL (ref 0.1–1)
MONOCYTES NFR BLD AUTO: 10.2 %
NEUTROPHILS # BLD AUTO: 5.57 X10 (3) UL (ref 1.5–7.7)
NEUTROPHILS # BLD AUTO: 5.57 X10(3) UL (ref 1.5–7.7)
NEUTROPHILS NFR BLD AUTO: 65.9 %
OSMOLALITY SERPL CALC.SUM OF ELEC: 309 MOSM/KG (ref 275–295)
PHOSPHATE SERPL-MCNC: 4.1 MG/DL (ref 2.4–5.1)
PLATELET # BLD AUTO: 194 10(3)UL (ref 150–450)
POTASSIUM SERPL-SCNC: 3.8 MMOL/L (ref 3.5–5.1)
RBC # BLD AUTO: 3 X10(6)UL
SODIUM SERPL-SCNC: 145 MMOL/L (ref 136–145)
TSI SER-ACNC: 2.08 UIU/ML (ref 0.55–4.78)
WBC # BLD AUTO: 8.5 X10(3) UL (ref 4–11)

## 2024-12-07 PROCEDURE — 99233 SBSQ HOSP IP/OBS HIGH 50: CPT | Performed by: INTERNAL MEDICINE

## 2024-12-07 PROCEDURE — 99291 CRITICAL CARE FIRST HOUR: CPT | Performed by: INTERNAL MEDICINE

## 2024-12-07 PROCEDURE — 99233 SBSQ HOSP IP/OBS HIGH 50: CPT | Performed by: HOSPITALIST

## 2024-12-07 RX ORDER — ACETAMINOPHEN 160 MG/5ML
650 SOLUTION ORAL EVERY 6 HOURS PRN
Status: DISCONTINUED | OUTPATIENT
Start: 2024-12-07 | End: 2024-12-23

## 2024-12-07 RX ORDER — POTASSIUM CHLORIDE 14.9 MG/ML
20 INJECTION INTRAVENOUS ONCE
Status: COMPLETED | OUTPATIENT
Start: 2024-12-07 | End: 2024-12-07

## 2024-12-07 RX ORDER — POTASSIUM CHLORIDE 1.5 G/1.58G
40 POWDER, FOR SOLUTION ORAL ONCE
Status: COMPLETED | OUTPATIENT
Start: 2024-12-07 | End: 2024-12-07

## 2024-12-07 RX ORDER — FUROSEMIDE 10 MG/ML
40 INJECTION INTRAMUSCULAR; INTRAVENOUS
Status: DISCONTINUED | OUTPATIENT
Start: 2024-12-07 | End: 2024-12-09

## 2024-12-07 RX ORDER — LORAZEPAM 1 MG/1
1 TABLET ORAL 2 TIMES DAILY PRN
Status: DISCONTINUED | OUTPATIENT
Start: 2024-12-07 | End: 2024-12-08

## 2024-12-07 NOTE — RESPIRATORY THERAPY NOTE
Patient received Trached, Portex 8, and on ventilator VC+ 18/550/+5/30%.     RT called to bedside vent alarming. Adjusted pt vent settings d/t pt bucking the vent, high pressures, high volumes. RT later called again to bedside d/t vent consistently alarming. Upon arrival seemed vent malfunction , RT then replaced with new vent and placed pt back on original settings. Pt tolerating well.     Bilateral breath sounds auscultated. Suction provided when indicated. Trach care provided. RT will continue to monitor.       12/07/24 0546   Readings   Total RR 21   Minute Ventilation (L/min) 10.9 L/min   Expiratory Tidal Volume 540 mL   PIP Observed (cm H2O) 29 cm H2O   MAP (cm H2O) 12

## 2024-12-07 NOTE — PROGRESS NOTES
[facility]  Nephrology Daily Progress Note    Alisha Wilde  [id number]  59 year old      HPI:   Alisha Wilde is a 59 year old female.  Sedated on Trach.  BP still requiring Cardene drip.  Tolerating tube feedings.        ROS:     Constitutional:  Negative for decreased activity, fever, irritability and lethargy  Endocrine:  Negative for abnormal sleep patterns, increased activity, polydipsia and polyphagia  Cardiovascular:  Negative for cool extremity and irregular heartbeat/palpitations  Gastrointestinal:  Negative for abdominal pain, constipation, decreased appetite, diarrhea and vomiting  Genitourinary:  Negative for dysuria and hematuria  Hema/Lymph:  Negative for easy bleeding and easy bruising  Integumentary:  Negative for pruritus and rash  Musculoskeletal:  Negative for bone/joint symptoms  Neurological:  Negative for gait disturbance  Psychiatric:  Negative for inappropriate interaction and psychiatric symptoms  Respiratory:  Negative for cough, dyspnea and wheezing      PHYSICAL EXAM:   Temp:  [97.9 °F (36.6 °C)-100 °F (37.8 °C)] 98.4 °F (36.9 °C)  Pulse:  [67-76] 76  Resp:  [18-24] 24  BP: (104-144)/(58-90) 142/64  SpO2:  [99 %-100 %] 100 %  FiO2 (%):  [30 %] 30 %  Patient Weight for the past 72 hrs:   Weight   12/05/24 0426 282 lb 3 oz (128 kg)   12/06/24 0551 285 lb 4.4 oz (129.4 kg)   12/07/24 0400 288 lb 12.8 oz (131 kg)       Constitutional: appears well hydrated alert and responsive no acute distress noted  Neck/Thyroid: neck is supple without adenopathy  Lymphatic: no abnormal cervical, supraclavicular or axillary adenopathy is noted  Respiratory: normal to inspection lungs are clear to auscultation bilaterally normal respiratory effort  Cardiovascular: regular rate and rhythm no murmurs, gallups, or rubs  Abdomen: soft non-tender non-distended no organomegaly noted no masses  Musculoskeletal: full ROM all extremities good strength  no deformities  Extremities: 1 + edema.     Neurological: exam appropriate for age reflexes and motor skills appropriate for age    Labs:  Lab Results   Component Value Date    WBC 8.5 12/07/2024    HGB 8.4 12/07/2024    HCT 26.2 12/07/2024    .0 12/07/2024    CREATSERUM 1.41 12/07/2024    BUN 36 12/07/2024     12/07/2024    K 3.8 12/07/2024     12/07/2024    CO2 18.0 12/07/2024     12/07/2024    CA 8.8 12/07/2024    ALB 3.3 12/07/2024    TSH 2.085 12/07/2024    MG 2.5 12/07/2024    PHOS 4.1 12/07/2024     Recent Labs   Lab 12/05/24  0416 12/06/24  0534 12/07/24  0627   WBC 12.2* 9.5 8.5   HGB 9.0* 8.5* 8.4*   HCT 26.3* 26.3* 26.2*   .0 264.0 194.0     Recent Labs   Lab 12/03/24  0312 12/04/24  0510 12/04/24  1728 12/05/24  0416 12/06/24  0534 12/07/24  0627   *  130* 128*   < > 120* 119* 113*   BUN 33*  33* 42*   < > 41* 38* 36*   CREATSERUM 1.60*  1.60* 1.65*   < > 1.61* 1.46* 1.41*   CA 8.9  8.9 9.0   < > 9.1 8.8 8.8   ALB 3.4 3.5  --  3.6 3.4 3.3     140 139   < > 142 145 145   K 4.5  4.5 4.4   < > 4.0 3.6 3.8   *  113* 111   < > 114* 116* 117*   CO2 19.0*  19.0* 17.0*   < > 17.0* 17.0* 18.0*   ALKPHO 86  --   --  86  --   --    AST 25  --   --  31  --   --    ALT 32  --   --  33  --   --    BILT 0.3  --   --  0.4  --   --    TP 6.3  --   --  6.3  --   --    PHOS 5.9* 5.3*  --   --  3.6 4.1    < > = values in this interval not displayed.       Imaging  XR CHEST AP PORTABLE  (CPT=71045)    Result Date: 12/5/2024  CONCLUSION:   No significant change when compared to 12/04/2024.    Dictated by (CST): Panchito Shahid MD on 12/05/2024 at 8:35 AM     Finalized by (CST): Panchito Shahid MD on 12/05/2024 at 8:38 AM             Medications:    Current Facility-Administered Medications:     furosemide (Lasix) 10 mg/mL injection 40 mg, 40 mg, Intravenous, BID (Diuretic)    potassium chloride (Klor-Con) 20 MEQ oral powder 40 mEq, 40 mEq, Oral, Once    aspirin chewable tab 81 mg, 81 mg, Peg Tube, Daily     carvedilol (Coreg) tab 25 mg, 25 mg, Oral, BID with meals    QUEtiapine (SEROquel) tab 50 mg, 50 mg, Per G Tube, BID    niCARdipine (carDENE) 125 mg in sodium chloride 0.9% 250 mL infusion, 5-15 mg/hr, Intravenous, Continuous    senna (Senokot) 8.8 MG/5ML oral syrup 8.8 mg, 5 mL, Per G Tube, BID    [COMPLETED] cloNIDine (Catapres) tab 0.3 mg, 0.3 mg, Oral, QID **FOLLOWED BY** [COMPLETED] cloNIDine (Catapres) tab 0.3 mg, 0.3 mg, Oral, TID **FOLLOWED BY** cloNIDine (Catapres) tab 0.3 mg, 0.3 mg, Oral, BID    hydrALAzine (Apresoline) 20 mg/mL injection 10 mg, 10 mg, Intravenous, Q6H PRN    docusate (Colace) 50 MG/5ML oral solution 100 mg, 100 mg, Oral, BID    sodium ferric gluconate (Ferrlecit) 125 mg in sodium chloride 0.9% 100mL IVPB premix, 125 mg, Intravenous, Daily    isosorbide dinitrate (Isordil) tab 40 mg, 40 mg, Per NG Tube, TID (Nitrates)    hydrALAZINE (Apresoline) tab 150 mg, 150 mg, Oral, Q8H GABRIEL    amLODIPine (Norvasc) tab 10 mg, 10 mg, Oral, Daily    sodium chloride 3 % nebulizer solution 3 mL, 3 mL, Nebulization, 4 times per day    ipratropium-albuterol (Duoneb) 0.5-2.5 (3) MG/3ML inhalation solution 3 mL, 3 mL, Nebulization, Q6H PRN    albuterol (Ventolin) (2.5 MG/3ML) 0.083% nebulizer solution 2.5 mg, 2.5 mg, Nebulization, 4 times per day    HYDROcodone-acetaminophen (Norco) 5-325 MG per tab 2 tablet, 2 tablet, Oral, Q4H PRN    heparin (Porcine) 5000 UNIT/ML injection 5,000 Units, 5,000 Units, Subcutaneous, Q8H GABRIEL    melatonin tab 3 mg, 3 mg, Oral, Nightly PRN    bisacodyl (Dulcolax) 10 MG rectal suppository 10 mg, 10 mg, Rectal, Daily PRN    dexmedeTOMIDine in sodium chloride 0.9% (Precedex) 400 mcg/100mL infusion premix, 0.2-1.5 mcg/kg/hr (Dosing Weight), Intravenous, Continuous    potassium chloride 20 mEq/100mL IVPB premix 20 mEq, 20 mEq, Intravenous, PRN **OR** potassium chloride 40 mEq/100mL IVPB premix (central line) 40 mEq, 40 mEq, Intravenous, PRN    magnesium sulfate in dextrose 5% 1  g/100mL infusion premix 1 g, 1 g, Intravenous, PRN    magnesium sulfate in sterile water for injection 2 g/50mL IVPB premix 2 g, 2 g, Intravenous, PRN    chlorhexidine gluconate (Peridex) 0.12 % oral solution 15 mL, 15 mL, Mouth/Throat, BID    morphINE PF 2 MG/ML injection 2 mg, 2 mg, Intravenous, Q2H PRN **OR** [DISCONTINUED] morphINE PF 4 MG/ML injection 4 mg, 4 mg, Intravenous, Q2H PRN    propofol (Diprivan) 10 mg/mL infusion premix, 5-50 mcg/kg/min (Dosing Weight), Intravenous, Continuous    fentaNYL (Sublimaze) 50 mcg/mL injection 25 mcg, 25 mcg, Intravenous, Q3H PRN    [DISCONTINUED] famotidine (Pepcid) tab 20 mg, 20 mg, Oral, Daily **OR** famotidine (Pepcid) 20 mg/2mL injection 20 mg, 20 mg, Intravenous, Daily    Allergies:  Allergies[1]    Input/Output:    Intake/Output Summary (Last 24 hours) at 12/7/2024 1102  Last data filed at 12/7/2024 0400  Gross per 24 hour   Intake 2058.71 ml   Output 1855 ml   Net 203.71 ml          ASSESSMENT/PLAN:   Assessment   Patient Active Problem List   Diagnosis    Hypercholesteremia    Alcoholism (HCC)    Essential hypertension    Vitamin D deficiency    Adjustment disorder with mixed anxiety and depressed mood    Controlled type 2 diabetes mellitus without complication, without long-term current use of insulin ([facility])    Obesity (BMI 30-39.9)    Neck mass    Polyp of colon    Flat feet, bilateral    Hypokalemia    Myalgia due to statin    Age-related nuclear cataract of both eyes    Positive for microalbuminuria    Dissection of ascending aorta (HCC)    NAIMA (acute kidney injury) ([facility])    Stage 3 chronic kidney disease ([facility])    Acute respiratory failure with hypoxia ([facility])       UO 2150 ml.  BUN/Creat a little better 36/1.4.  Will increase Lasix to 40 mg bid.  Replace K. Pt with resistant HTN.  CTA on 11/21/24 showed 50% narrowing of L renal artery.  Will try doppler renal ultrasound but body habitus may limit exam. Would not start ACE/ARB while pushing diuretics.  Discussed  with RN.           12/7/2024  Juan Franco MD               [1]   Allergies  Allergen Reactions    Atorvastatin MYALGIA    Pravastatin MYALGIA    Rosuvastatin MYALGIA    Seasonal Runny nose

## 2024-12-07 NOTE — PLAN OF CARE
Attempting to wean off propofol, increased precedex gtt for anxiety/coughing/fighting ventilator. Patient at max dose on cardene gtt. Patient anxious, appears frustrated/trying to talk. Notified Kassandra MURPHY for something for anxiety/more frequent pain medication. Cardiology aware of HTN, \"keep BP below 150, as best you can\". Per Nephrology cannot start ACE/ARB d/t kidney function. NPO at midnight for ultrasound of the kidneys. TF at goal. Replaced K. Heparin sub q. SCDs on.    Problem: Patient Centered Care  Goal: Patient preferences are identified and integrated in the patient's plan of care  Description: Interventions:  - What would you like us to know as we care for you? I work at [employer] and I am still very good friends with people from grade school! My brother, Osbaldo Prince, has been making decisions for me.  - Provide timely, complete, and accurate information to patient/family  - Incorporate patient and family knowledge, values, beliefs, and cultural backgrounds into the planning and delivery of care  - Encourage patient/family to participate in care and decision-making at the level they choose  - Honor patient and family perspectives and choices  Outcome: Progressing     Problem: Diabetes/Glucose Control  Goal: Glucose maintained within prescribed range  Description: INTERVENTIONS:  - Monitor Blood Glucose as ordered  - Assess for signs and symptoms of hyperglycemia and hypoglycemia  - Administer ordered medications to maintain glucose within target range  - Assess barriers to adequate nutritional intake and initiate nutrition consult as needed  - Instruct patient on self management of diabetes  Outcome: Progressing     Problem: CARDIOVASCULAR - ADULT  Goal: Maintains optimal cardiac output and hemodynamic stability  Description: INTERVENTIONS:  - Monitor vital signs, rhythm, and trends  - Monitor for bleeding, hypotension and signs of decreased cardiac output  - Evaluate effectiveness of  vasoactive medications to optimize hemodynamic stability  - Monitor arterial and/or venous puncture sites for bleeding and/or hematoma  - Assess quality of pulses, skin color and temperature  - Assess for signs of decreased coronary artery perfusion - ex. Angina  - Evaluate fluid balance, assess for edema, trend weights  Outcome: Not Progressing     Problem: RESPIRATORY - ADULT  Goal: Achieves optimal ventilation and oxygenation  Description: INTERVENTIONS:  - Assess for changes in respiratory status  - Assess for changes in mentation and behavior  - Position to facilitate oxygenation and minimize respiratory effort  - Oxygen supplementation based on oxygen saturation or ABGs  - Provide Smoking Cessation handout, if applicable  - Encourage broncho-pulmonary hygiene including cough, deep breathe, Incentive Spirometry  - Assess the need for suctioning and perform as needed  - Assess and instruct to report SOB or any respiratory difficulty  - Respiratory Therapy support as indicated  - Manage/alleviate anxiety  - Monitor for signs/symptoms of CO2 retention  Outcome: Progressing     Problem: METABOLIC/FLUID AND ELECTROLYTES - ADULT  Goal: Glucose maintained within prescribed range  Description: INTERVENTIONS:  - Monitor Blood Glucose as ordered  - Assess for signs and symptoms of hyperglycemia and hypoglycemia  - Administer ordered medications to maintain glucose within target range  - Assess barriers to adequate nutritional intake and initiate nutrition consult as needed  - Instruct patient on self management of diabetes  Outcome: Progressing     Problem: PAIN - ADULT  Goal: Verbalizes/displays adequate comfort level or patient's stated pain goal  Description: INTERVENTIONS:  - Encourage pt to monitor pain and request assistance  - Assess pain using appropriate pain scale  - Administer analgesics based on type and severity of pain and evaluate response  - Implement non-pharmacological measures as appropriate and  evaluate response  - Consider cultural and social influences on pain and pain management  - Manage/alleviate anxiety  - Utilize distraction and/or relaxation techniques  - Monitor for opioid side effects  - Notify MD/LIP if interventions unsuccessful or patient reports new pain  - Anticipate increased pain with activity and pre-medicate as appropriate  Outcome: Not Progressing

## 2024-12-07 NOTE — PROGRESS NOTES
[facility]  part of [facility]    Progress Note    Alisha Wilde Patient Status:  Inpatient    10/25/1965 MRN [id number]   Location [facility] 2W/SW Attending Missy Paulson MD   Hosp Day # 15 PCP Bridger Avendano MD     Subjective:  Pt sedated with Trach on vent support. She opens eyes to verbal stimuli. Weakly moves all ext. No visitors at bedside.     Objective:  /64 (BP Location: Left arm)   Pulse 76   Temp 98.4 °F (36.9 °C) (Axillary)   Resp 24   Ht 5' 5\" (1.651 m)   Wt 288 lb 12.8 oz (131 kg)   SpO2 100%   BMI 48.06 kg/m²     Temp (24hrs), Av.7 °F (37.1 °C), Min:97.9 °F (36.6 °C), Max:100 °F (37.8 °C)      Intake/Output:    Intake/Output Summary (Last 24 hours) at 2024 1108  Last data filed at 2024 0400  Gross per 24 hour   Intake 2058.71 ml   Output 1855 ml   Net 203.71 ml       Wt Readings from Last 3 Encounters:   24 288 lb 12.8 oz (131 kg)   10/24/24 254 lb (115.2 kg)   24 249 lb (112.9 kg)       Allergies:  Allergies[1]    Labs:  Lab Results   Component Value Date    WBC 8.5 2024    HGB 8.4 2024    HCT 26.2 2024    .0 2024    CREATSERUM 1.41 2024    BUN 36 2024     2024    K 3.8 2024     2024    CO2 18.0 2024     2024    CA 8.8 2024    ALB 3.3 2024    TSH 2.085 2024    MG 2.5 2024    PHOS 4.1 2024       Physical Exam:  Blood pressure 142/64, pulse 76, temperature 98.4 °F (36.9 °C), temperature source Axillary, resp. rate 24, height 5' 5\" (1.651 m), weight 288 lb 12.8 oz (131 kg), SpO2 100%, not currently breastfeeding.  Neck: Trach in place  Lungs: mildly coarse w/faint expiratory wheezes  Heart: RRR, S1, S2  Abdomen: Soft/Obese, NT/ND, BS+x 4/+BM   Extremities: Warm, dry,1-2+ generalized UE & LE edema bilat  Pulses: 2+ bilat DP  Skin: sternotomy incision CATHIE=CDI-healing well  Neurological: sedated/opens eyes to  verbal stimuli/spontaneously moved all ext    Assessment/Plan:  S/P EMERGENT AORTIC DISSECTION REPAIR POD # 15  Respiratory Failure w/multiple failed SBT prior to trach placement -currently on full vent support-continue daily weaning trials. Re-intubated on 11/22 per Pulm after emesis episode/ETT removal due to emesis content noted in airway.    S/P Trach placement per ENT on 12/4  Completed ABX course for suspected aspiration pneumonia.   CT Chest/Abdomen/Pelvis (non-contrast) 11/26=no obstruction noted/stable. +BM (s) post op  Hx: HTN: on multiple antihypertensives including IV Cardene-slowly able to wean: SBP goal= <130/MAP >70. Mgt per Cards. New A-line placed 12/2  New PICC placed 12/2  Pain meds as needed  Scds/Heparin SubQ prophylaxis DVT prevention. HIPA 11/25=negative. Plts continue >100,000  Continue ICU status  Hx: CKD. Expected post op volume overload w/mgt per Nephrology (consulted on 11/23) for NAIMA. Limit/avoid nephrotoxic meds: Lasix daily per Nephrology. Webb remains for close I/O monitoring & immobility.  Expected post op anemia: w/o clinical significance/stable. May be slightly diluted due to volume overload.   Hx: Morbid obesity w/BMI >40-plan for RD to see when appropriate  Nutrition per TF via PEG. PEG placed 12/5 per GI.    Abdominal X-ray 12/4=Gas- has had post op BMs- continues on Senna/Colace  Hx: ETOH abuse-monitor for withdrawals. CIWA protocol PRN   Discharge planning: pt lives alone and has supportive family.   Anticipate LTAC at discharge: referrals sent proactively by  on 11/29 for LTAC. Tentatively ready once BP improves.       Plan of care discussed w/RN and rounding CV Surgeon: Dr. Raj Noel, APRN  12/7/2024  11:08 AM       [1]   Allergies  Allergen Reactions    Atorvastatin MYALGIA    Pravastatin MYALGIA    Rosuvastatin MYALGIA    Seasonal Runny nose

## 2024-12-07 NOTE — PROGRESS NOTES
[facility]  part of [facility]    Progress Note      Assessment and Plan:   1.  Dissection of ascending aorta.    Recommendations:  1.  As per CV surgery and cardiology status postrepair  2.  Discharge planning.    2.  Respiratory failure-patient with bilateral infiltrates and aspiration phenomenon.  The patient had failed her spontaneous breathing trials on multiple occasions and now has tracheostomy tube in place.  Little change clinically otherwise.    Recommendations:  1.  Daily spontaneous breathing trial  2.  Ongoing empiric antibiotic  3.  Lasix  4.  Morning chest x-ray    3.  Morbid obesity    4.  DVT prophylaxis-heparin    5.  CODE STATUS-full    6.  Hypertension-hydralazine added.    7.  NAIMA-improved.  Diuretic accelerated now by nephrology.    Subjective:   Alisha Wilde is a(n) 59 year old female who is sedated on ventilator, but does arouse to stimulation and moves extremities.    Objective:   Blood pressure 146/66, pulse 81, temperature 100.3 °F (37.9 °C), temperature source Axillary, resp. rate 20, height 5' 5\" (1.651 m), weight 288 lb 12.8 oz (131 kg), SpO2 100%, not currently breastfeeding.    Physical Exam sedate obese black female  HEENT examination is unremarkable with pupils equal round and reactive to light and accommodation.   Neck without adenopathy, thyromegaly, JVD nor bruit.   Lungs diminished to auscultation and percussion.  Cardiac regular rate and rhythm no murmur.   Abdomen nontender, without hepatosplenomegaly and no mass appreciable.   Extremities without clubbing cyanosis nor edema.   Neurologic sedate on ventilator.  Skin without gross abnormality     Results:     Lab Results   Component Value Date    WBC 8.5 12/07/2024    HGB 8.4 12/07/2024    HCT 26.2 12/07/2024    .0 12/07/2024    CREATSERUM 1.41 12/07/2024    BUN 36 12/07/2024     12/07/2024    K 3.8 12/07/2024     12/07/2024    CO2 18.0 12/07/2024     12/07/2024    CA 8.8  12/07/2024    ALB 3.3 12/07/2024    TSH 2.085 12/07/2024    MG 2.5 12/07/2024    PHOS 4.1 12/07/2024       Fantasma Spicer MD  Medical Director, Critical Care, [facility]  Medical Director, [facility]  Pager: [phone]  >35 min crit care

## 2024-12-07 NOTE — PROGRESS NOTES
[facility]  part of [facility]    Progress Note    Alisha Wilde Patient Status:  Inpatient    10/25/1965 MRN [id number]   Location [facility] 2W/SW Attending Hayde Brito MD   Hosp Day # 15 PCP Bridger Avendano MD     Chief Complaint:   Chief Complaint   Patient presents with    Chest Pain Angina       Subjective:   Alisha Wilde has trach collar in place. Currently sedated due to agitation.      Objective:   Objective:    Blood pressure (!) 169/71, pulse 96, temperature 99.1 °F (37.3 °C), temperature source Axillary, resp. rate 23, height 5' 5\" (1.651 m), weight 288 lb 12.8 oz (131 kg), SpO2 100%, not currently breastfeeding.    Physical Exam:    General: No acute distress. Intubated.   Respiratory: Diminished bases   Cardiovascular: S1, S2. Regular rate and rhythm. No murmurs, rubs or gallops.   Abdomen: Soft, nontender, distended.  Positive bowel sounds. No rebound or guarding.  Extremities: 2+ pitting edema x 4 ext    Results:   Results:    Labs:  Recent Labs   Lab 24  0510 24  0432 24  0312 24  0510 24  1728 24  0416 24  0534 24  0627   WBC 11.9* 10.4 11.5* 11.9* 13.4* 12.2* 9.5 8.5   HGB 7.7* 8.3* 8.0* 7.7* 8.7* 9.0* 8.5* 8.4*   MCV 82.7 81.2 83.8 83.7 84.8 83.0 83.8 87.3   .0 211.0 194.0 238.0 216.0 224.0 264.0 194.0   BAND 3 10 3 4  --   --   --   --    INR  --   --   --  1.17  --   --   --   --        Recent Labs   Lab 24  0312 24  0510 24  0416 24  0534 24  0627   *  130*   < > 120* 119* 113*   BUN 33*  33*   < > 41* 38* 36*   CREATSERUM 1.60*  1.60*   < > 1.61* 1.46* 1.41*   CA 8.9  8.9   < > 9.1 8.8 8.8   ALB 3.4   < > 3.6 3.4 3.3     140   < > 142 145 145   K 4.5  4.5   < > 4.0 3.6 3.8   *  113*   < > 114* 116* 117*   CO2 19.0*  19.0*   < > 17.0* 17.0* 18.0*   ALKPHO 86  --  86  --   --    AST 25  --  31  --   --    ALT 32  --  33  --   --    BILT 0.3  --  0.4   --   --    TP 6.3  --  6.3  --   --     < > = values in this interval not displayed.       Estimated Creatinine Clearance: 37.7 mL/min (A) (based on SCr of 1.41 mg/dL (H)).    Recent Labs   Lab 12/04/24  0510   PTP 15.6*   INR 1.17         Lab Results   Component Value Date    TSH 2.085 12/07/2024       Culture:  No results found for this visit on 11/21/24.    Cardiac  No results for input(s): \"TROP\", \"PBNP\" in the last 168 hours.        Imaging: Imaging data reviewed in Epic.  No results found.    Medications:    furosemide  40 mg Intravenous BID (Diuretic)    aspirin  81 mg Peg Tube Daily    carvedilol  25 mg Oral BID with meals    QUEtiapine  50 mg Per G Tube BID    senna  5 mL Per G Tube BID    cloNIDine  0.3 mg Oral BID    docusate  100 mg Oral BID    isosorbide dinitrate  40 mg Per NG Tube TID (Nitrates)    hydrALAZINE  150 mg Oral Q8H GABRIEL    amLODIPine  10 mg Oral Daily    sodium chloride  3 mL Nebulization 4 times per day    albuterol  2.5 mg Nebulization 4 times per day    heparin  5,000 Units Subcutaneous Q8H GABRIEL    chlorhexidine gluconate  15 mL Mouth/Throat BID    famotidine  20 mg Intravenous Daily         Assessment and Plan:   Assessment & Plan:      Type A aortic dissection   S/p emergent repair 11/22/24   On cardene drip per Cards for blood pressure management  Antihypertensives being slowly added now that PEG is in place- resume tube feeds today   New picc 12/2  CV surgery, cards and critical care on consult.   TTE LVEF 75-80%.   Cont BP control per Cards  Discharge to LTAC once blood pressure improves   Acute hypoxic respiratory failure   Aspiration event   Completed 10 days of zosyn.   Sputum cx candida   Failed SBT multiple times. Plans for trach tomorrow. PEG 12/5   ENT and GI on consult. Plan to remove sutures tomorrow   Pulmonary on consult.   Lasix every other day for fluid overload- per Nephrolopgy   CXR with multifocal airspace dz  Albuterol nebs   Status post trach placement 12/4  Status  post PEG placement  12/5- resume tube feeds   Seroquel started to help with agitation 50 mg BID  On precedex and propofol- unsucessful weaning due to agitation   H/o tobacco and ETOH abuse   Duonebs PRN   CIWA protocol  NAIMA on CKD III   Renal on consult.   [place] to be secondary to MARY LOU/ATN   BUN/creat trending up. Lasix per Renal   Essential HTN   Coreg 25 mg BID, norvasc 10mg daily, hydralazine 150mg TID. Clonidinie patch 0.2mg TID   Add hydralazine combination with isosobide 150-40 mg TID   Stop lasix.- volume management per Renal  ARB on hold due to NAIMA.   Cardene drip- please wean off per Cards   Iron def anemia  IV iron.   Iron level low   Transfuse for Hb < 7.0   Other medical problems  Morbid Obesity   TANYA     >55min spent, >50% spent counseling and coordinating care in the form of educating pt/family and d/w consultants and staff. Most of the time spent discussing the above plan.        Plan of care discussed with patient or family at bedside.      Missy Paulson MD  Hospitalist          Supplementary Documentation:     Quality:  DVT Prophylaxis: heparin   CODE status: Full  Dispo: per clinical course            Estimated date of discharge: TBD  Discharge is dependent on: clinical stability  At this point Ms. Wilde is expected to be discharge to: TBD

## 2024-12-07 NOTE — PLAN OF CARE
Problem: RESPIRATORY - ADULT  Goal: Achieves optimal ventilation and oxygenation  Description: INTERVENTIONS:  - Assess for changes in respiratory status  - Assess for changes in mentation and behavior  - Position to facilitate oxygenation and minimize respiratory effort  - Oxygen supplementation based on oxygen saturation or ABGs  - Provide Smoking Cessation handout, if applicable  - Encourage broncho-pulmonary hygiene including cough, deep breathe, Incentive Spirometry  - Assess the need for suctioning and perform as needed  - Assess and instruct to report SOB or any respiratory difficulty  - Respiratory Therapy support as indicated  - Manage/alleviate anxiety  - Monitor for signs/symptoms of CO2 retention  Outcome: Not Progressing     Problem: CARDIOVASCULAR - ADULT  Goal: Maintains optimal cardiac output and hemodynamic stability  Description: INTERVENTIONS:  - Monitor vital signs, rhythm, and trends  - Monitor for bleeding, hypotension and signs of decreased cardiac output  - Evaluate effectiveness of vasoactive medications to optimize hemodynamic stability  - Monitor arterial and/or venous puncture sites for bleeding and/or hematoma  - Assess quality of pulses, skin color and temperature  - Assess for signs of decreased coronary artery perfusion - ex. Angina  - Evaluate fluid balance, assess for edema, trend weights  Outcome: Progressing  Goal: Absence of cardiac arrhythmias or at baseline  Description: INTERVENTIONS:  - Continuous cardiac monitoring, monitor vital signs, obtain 12 lead EKG if indicated  - Evaluate effectiveness of antiarrhythmic and heart rate control medications as ordered  - Initiate emergency measures for life threatening arrhythmias  - Monitor electrolytes and administer replacement therapy as ordered  Outcome: Progressing     Problem: Diabetes/Glucose Control  Goal: Glucose maintained within prescribed range  Description: INTERVENTIONS:  - Monitor Blood Glucose as ordered  - Assess for  signs and symptoms of hyperglycemia and hypoglycemia  - Administer ordered medications to maintain glucose within target range  - Assess barriers to adequate nutritional intake and initiate nutrition consult as needed  - Instruct patient on self management of diabetes  Outcome: Progressing     Problem: GASTROINTESTINAL - ADULT  Goal: Maintains or returns to baseline bowel function  Description: INTERVENTIONS:  - Assess bowel function  - Maintain adequate hydration with IV or PO as ordered and tolerated  - Evaluate effectiveness of GI medications  - Encourage mobilization and activity  - Obtain nutritional consult as needed  - Establish a toileting routine/schedule  - Consider collaborating with pharmacy to review patient's medication profile  Outcome: Not Progressing     Problem: SKIN/TISSUE INTEGRITY - ADULT  Goal: Skin integrity remains intact  Description: INTERVENTIONS  - Assess and document risk factors for pressure ulcer development  - Assess and document skin integrity  - Monitor for areas of redness and/or skin breakdown  - Initiate interventions, skin care algorithm/standards of care as needed  Outcome: Progressing  Goal: Incision(s), wounds(s) or drain site(s) healing without S/S of infection  Description: INTERVENTIONS:  - Assess and document risk factors for pressure ulcer development  - Assess and document skin integrity  - Assess and document dressing/incision, wound bed, drain sites and surrounding tissue  - Implement wound care per orders  - Initiate isolation precautions as appropriate  - Initiate Pressure Ulcer prevention bundle as indicated  Outcome: Not Progressing  Goal: Oral mucous membranes remain intact  Description: INTERVENTIONS  - Assess oral mucosa and hygiene practices  - Implement preventative oral hygiene regimen  - Implement oral medicated treatments as ordered  Outcome: Progressing     Problem: HEMATOLOGIC - ADULT  Goal: Maintains hematologic stability  Description: INTERVENTIONS  -  Assess for signs and symptoms of bleeding or hemorrhage  - Monitor labs and vital signs for trends  - Administer supportive blood products/factors, fluids and medications as ordered and appropriate  - Administer supportive blood products/factors as ordered and appropriate  Outcome: Not Progressing     Problem: NEUROLOGICAL - ADULT  Goal: Achieves stable or improved neurological status  Description: INTERVENTIONS  - Assess for and report changes in neurological status  - Initiate measures to prevent increased intracranial pressure  - Maintain blood pressure and fluid volume within ordered parameters to optimize cerebral perfusion and minimize risk of hemorrhage  - Monitor temperature, glucose, and sodium. Initiate appropriate interventions as ordered  Outcome: Not Progressing     Problem: PAIN - ADULT  Goal: Verbalizes/displays adequate comfort level or patient's stated pain goal  Description: INTERVENTIONS:  - Encourage pt to monitor pain and request assistance  - Assess pain using appropriate pain scale  - Administer analgesics based on type and severity of pain and evaluate response  - Implement non-pharmacological measures as appropriate and evaluate response  - Consider cultural and social influences on pain and pain management  - Manage/alleviate anxiety  - Utilize distraction and/or relaxation techniques  - Monitor for opioid side effects  - Notify MD/LIP if interventions unsuccessful or patient reports new pain  - Anticipate increased pain with activity and pre-medicate as appropriate  Outcome: Not Progressing

## 2024-12-07 NOTE — PROGRESS NOTES
Cardiology Progress Note    Alisha Wilde Patient Status:  Inpatient    10/25/1965 MRN [id number]   Location [facility] 2W/SW Attending Missy Paulson MD   Hosp Day # 15 PCP Bridger Avendano MD       Subjective:     Sedated due to agitation. On trach.     Objective:   Temp: 97.9 °F (36.6 °C)  Pulse: 68  Resp: 21  BP: 136/63  FiO2 (%): 30 %    Intake/Output:     Intake/Output Summary (Last 24 hours) at 2024 0848  Last data filed at 2024 0400  Gross per 24 hour   Intake 2158.71 ml   Output 1975 ml   Net 183.71 ml       Last 3 Weights   24 0400 288 lb 12.8 oz (131 kg)   24 0551 285 lb 4.4 oz (129.4 kg)   24 0426 282 lb 3 oz (128 kg)   24 0322 291 lb 0.1 oz (132 kg)   24 0300 285 lb 7.9 oz (129.5 kg)   24 0600 288 lb 5.8 oz (130.8 kg)   24 0300 268 lb 8.3 oz (121.8 kg)   24 0600 266 lb 5.1 oz (120.8 kg)   24 0600 284 lb 9.8 oz (129.1 kg)   24 0621 282 lb 3 oz (128 kg)   24 0800 275 lb 9.2 oz (125 kg)   24 0419 258 lb 2.5 oz (117.1 kg)   24 0200 258 lb 2.5 oz (117.1 kg)   10/24/24 1519 254 lb (115.2 kg)   24 1522 249 lb (112.9 kg)         Physical Exam:     General: Sedated.   HEENT: Normocephalic, anicteric sclera. Trach in place.   Cardiac: Regular rate and rhythm. S1, S2 normal. No murmur, pericardial rub, S3.  Lungs: Clear b/l.   Abdomen: Soft, non-tender.   Extremities: Without clubbing, cyanosis or edema.    Neurologic: Alert, normal affect.  Skin: Warm and dry.     Laboratory/Data:    Labs:         Recent Labs   Lab 24  0510 24  0432 24  0312 24  0510 24  1728 24  0416 24  0534 24  0627   WBC 11.9* 10.4 11.5* 11.9* 13.4* 12.2* 9.5 8.5   HGB 7.7* 8.3* 8.0* 7.7* 8.7* 9.0* 8.5* 8.4*   MCV 82.7 81.2 83.8 83.7 84.8 83.0 83.8 87.3   .0 211.0 194.0 238.0 216.0 224.0 264.0 194.0   BAND 3 10 3 4  --   --   --   --    INR  --   --   --  1.17  --   --   --   --         Recent Labs   Lab 12/02/24  0434 12/02/24  0636 12/03/24  0312 12/04/24  0510 12/04/24  1728 12/05/24  0416 12/06/24  0534 12/07/24  0627     --  140  140 139 141 142 145 145   K  --    < > 4.5  4.5 4.4 4.5 4.0 3.6 3.8     --  113*  113* 111 114* 114* 116* 117*   CO2 20.0*  --  19.0*  19.0* 17.0* 17.0* 17.0* 17.0* 18.0*   BUN  --    < > 33*  33* 42* 44* 41* 38* 36*   CREATSERUM 1.46*  --  1.60*  1.60* 1.65* 1.78* 1.61* 1.46* 1.41*   CA 9.1  --  8.9  8.9 9.0 9.2 9.1 8.8 8.8   MG 2.2  --  2.3 2.5  --   --  2.7* 2.5   PHOS 5.2*  --  5.9* 5.3*  --   --  3.6 4.1   *  --  130*  130* 128* 118* 120* 119* 113*    < > = values in this interval not displayed.       Recent Labs   Lab 12/03/24  0312 12/04/24  0510 12/05/24  0416 12/06/24  0534 12/07/24  0627   ALT 32  --  33  --   --    AST 25  --  31  --   --    ALB 3.4 3.5 3.6 3.4 3.3       No results for input(s): \"TROP\" in the last 168 hours.    Allergies:   Allergies[1]    Medications:  Current Facility-Administered Medications   Medication Dose Route Frequency    potassium chloride 20 mEq/100mL IVPB premix 20 mEq  20 mEq Intravenous Once    aspirin chewable tab 81 mg  81 mg Peg Tube Daily    carvedilol (Coreg) tab 25 mg  25 mg Oral BID with meals    QUEtiapine (SEROquel) tab 50 mg  50 mg Per G Tube BID    furosemide (Lasix) 10 mg/mL injection 40 mg  40 mg Intravenous Daily    niCARdipine (carDENE) 125 mg in sodium chloride 0.9% 250 mL infusion  5-15 mg/hr Intravenous Continuous    senna (Senokot) 8.8 MG/5ML oral syrup 8.8 mg  5 mL Per G Tube BID    cloNIDine (Catapres) tab 0.3 mg  0.3 mg Oral BID    hydrALAzine (Apresoline) 20 mg/mL injection 10 mg  10 mg Intravenous Q6H PRN    docusate (Colace) 50 MG/5ML oral solution 100 mg  100 mg Oral BID    sodium ferric gluconate (Ferrlecit) 125 mg in sodium chloride 0.9% 100mL IVPB premix  125 mg Intravenous Daily    isosorbide dinitrate (Isordil) tab 40 mg  40 mg Per NG Tube TID (Nitrates)     hydrALAZINE (Apresoline) tab 150 mg  150 mg Oral Q8H GABRIEL    amLODIPine (Norvasc) tab 10 mg  10 mg Oral Daily    sodium chloride 3 % nebulizer solution 3 mL  3 mL Nebulization 4 times per day    ipratropium-albuterol (Duoneb) 0.5-2.5 (3) MG/3ML inhalation solution 3 mL  3 mL Nebulization Q6H PRN    albuterol (Ventolin) (2.5 MG/3ML) 0.083% nebulizer solution 2.5 mg  2.5 mg Nebulization 4 times per day    HYDROcodone-acetaminophen (Norco) 5-325 MG per tab 2 tablet  2 tablet Oral Q4H PRN    heparin (Porcine) 5000 UNIT/ML injection 5,000 Units  5,000 Units Subcutaneous Q8H GABRIEL    melatonin tab 3 mg  3 mg Oral Nightly PRN    bisacodyl (Dulcolax) 10 MG rectal suppository 10 mg  10 mg Rectal Daily PRN    dexmedeTOMIDine in sodium chloride 0.9% (Precedex) 400 mcg/100mL infusion premix  0.2-1.5 mcg/kg/hr (Dosing Weight) Intravenous Continuous    potassium chloride 20 mEq/100mL IVPB premix 20 mEq  20 mEq Intravenous PRN    Or    potassium chloride 40 mEq/100mL IVPB premix (central line) 40 mEq  40 mEq Intravenous PRN    magnesium sulfate in dextrose 5% 1 g/100mL infusion premix 1 g  1 g Intravenous PRN    magnesium sulfate in sterile water for injection 2 g/50mL IVPB premix 2 g  2 g Intravenous PRN    chlorhexidine gluconate (Peridex) 0.12 % oral solution 15 mL  15 mL Mouth/Throat BID    morphINE PF 2 MG/ML injection 2 mg  2 mg Intravenous Q2H PRN    propofol (Diprivan) 10 mg/mL infusion premix  5-50 mcg/kg/min (Dosing Weight) Intravenous Continuous    fentaNYL (Sublimaze) 50 mcg/mL injection 25 mcg  25 mcg Intravenous Q3H PRN    famotidine (Pepcid) 20 mg/2mL injection 20 mg  20 mg Intravenous Daily         Assessment:  Type A aortic dissection s/p emergent repair 11/22/24 c/b Acute hypoxic respiratory failure c/b aspiration requiring re-intubation and eventual trach 12/4  NAIMA on CKD - improving   Etoh abuse hx   HTN     Plan:  ECHO 11/23 w/ hyperdynamic LVEF   Current anti-htn regimen:  Coreg 25 mg bid    Hydralazine/isosorbide dinitrate 150-40 mg tid   Amlodipine 10 mg every day   Clonidine 0.3 mg tid   Cardene gtt on-going - wean as tolerated  Recommend adding ARB/Ace-I when ok from nephro standpoint   CTA chest/abd/pelvis 11/21 reviewed - right renal artery originates from true lumen. Left renal origin partially occupied by false lumen? Unable to review op report for further info. Renal artery US would likely be significantly limited due to body habitus. Would benefit from secondary work up eventually. Check TSH.   Volume up - lasix 40 mg every day - per nephrology         David Atwood DO  Cardiologist  [facility]  12/7/2024 8:48 AM        Note to the patient: The 21st Century Cures Act makes medical notes like these available to patients in the interest of transparency. However, be advised that this is a medical document. It is intended as peer to peer communication. It is written in medical language and may contain abbreviations or verbiage that are unfamiliar. It may appear blunt or direct. Medical documents are intended to carry relevant information, facts as evident, and clinical opinion of the practitioner.     Disclaimer: Components of this note were documented using voice recognition system and are subject to errors not corrected at proofreading. Contact the author of this note for any clarifications.          [1]   Allergies  Allergen Reactions    Atorvastatin MYALGIA    Pravastatin MYALGIA    Rosuvastatin MYALGIA    Seasonal Runny nose

## 2024-12-08 ENCOUNTER — APPOINTMENT (OUTPATIENT)
Dept: GENERAL RADIOLOGY | Age: 59
DRG: 003 | End: 2024-12-08
Attending: INTERNAL MEDICINE
Payer: COMMERCIAL

## 2024-12-08 ENCOUNTER — APPOINTMENT (OUTPATIENT)
Dept: GENERAL RADIOLOGY | Facility: HOSPITAL | Age: 59
End: 2024-12-08
Attending: INTERNAL MEDICINE
Payer: COMMERCIAL

## 2024-12-08 ENCOUNTER — APPOINTMENT (OUTPATIENT)
Dept: ULTRASOUND IMAGING | Facility: HOSPITAL | Age: 59
DRG: 003 | End: 2024-12-08
Attending: INTERNAL MEDICINE
Payer: COMMERCIAL

## 2024-12-08 ENCOUNTER — APPOINTMENT (OUTPATIENT)
Dept: ULTRASOUND IMAGING | Facility: HOSPITAL | Age: 59
End: 2024-12-08
Attending: INTERNAL MEDICINE
Payer: COMMERCIAL

## 2024-12-08 ENCOUNTER — APPOINTMENT (OUTPATIENT)
Dept: GENERAL RADIOLOGY | Facility: HOSPITAL | Age: 59
DRG: 003 | End: 2024-12-08
Attending: INTERNAL MEDICINE
Payer: COMMERCIAL

## 2024-12-08 ENCOUNTER — APPOINTMENT (OUTPATIENT)
Dept: GENERAL RADIOLOGY | Age: 59
End: 2024-12-08
Attending: INTERNAL MEDICINE
Payer: COMMERCIAL

## 2024-12-08 LAB
ALBUMIN SERPL-MCNC: 3.8 G/DL (ref 3.2–4.8)
ANION GAP SERPL CALC-SCNC: 7 MMOL/L (ref 0–18)
BASOPHILS # BLD AUTO: 0.06 X10(3) UL (ref 0–0.2)
BASOPHILS NFR BLD AUTO: 0.6 %
BUN BLD-MCNC: 38 MG/DL (ref 9–23)
BUN/CREAT SERPL: 25.7 (ref 10–20)
CALCIUM BLD-MCNC: 8.4 MG/DL (ref 8.7–10.4)
CHLORIDE SERPL-SCNC: 116 MMOL/L (ref 98–112)
CO2 SERPL-SCNC: 18 MMOL/L (ref 21–32)
CREAT BLD-MCNC: 1.48 MG/DL
DEPRECATED RDW RBC AUTO: 56.3 FL (ref 35.1–46.3)
EGFRCR SERPLBLD CKD-EPI 2021: 40 ML/MIN/1.73M2 (ref 60–?)
EOSINOPHIL # BLD AUTO: 0.46 X10(3) UL (ref 0–0.7)
EOSINOPHIL NFR BLD AUTO: 4.6 %
ERYTHROCYTE [DISTWIDTH] IN BLOOD BY AUTOMATED COUNT: 17.9 % (ref 11–15)
GLUCOSE BLD-MCNC: 158 MG/DL (ref 70–99)
GLUCOSE BLDC GLUCOMTR-MCNC: 133 MG/DL (ref 70–99)
GLUCOSE BLDC GLUCOMTR-MCNC: 138 MG/DL (ref 70–99)
GLUCOSE BLDC GLUCOMTR-MCNC: 146 MG/DL (ref 70–99)
GLUCOSE BLDC GLUCOMTR-MCNC: 159 MG/DL (ref 70–99)
HCT VFR BLD AUTO: 33.5 %
HGB BLD-MCNC: 10.9 G/DL
IMM GRANULOCYTES # BLD AUTO: 0.36 X10(3) UL (ref 0–1)
IMM GRANULOCYTES NFR BLD: 3.6 %
LYMPHOCYTES # BLD AUTO: 1.26 X10(3) UL (ref 1–4)
LYMPHOCYTES NFR BLD AUTO: 12.7 %
MAGNESIUM SERPL-MCNC: 2.2 MG/DL (ref 1.6–2.6)
MCH RBC QN AUTO: 28.4 PG (ref 26–34)
MCHC RBC AUTO-ENTMCNC: 32.5 G/DL (ref 31–37)
MCV RBC AUTO: 87.2 FL
MONOCYTES # BLD AUTO: 0.86 X10(3) UL (ref 0.1–1)
MONOCYTES NFR BLD AUTO: 8.7 %
NEUTROPHILS # BLD AUTO: 6.94 X10 (3) UL (ref 1.5–7.7)
NEUTROPHILS # BLD AUTO: 6.94 X10(3) UL (ref 1.5–7.7)
NEUTROPHILS NFR BLD AUTO: 69.8 %
OSMOLALITY SERPL CALC.SUM OF ELEC: 304 MOSM/KG (ref 275–295)
PHOSPHATE SERPL-MCNC: 4.2 MG/DL (ref 2.4–5.1)
PLATELET # BLD AUTO: 270 10(3)UL (ref 150–450)
PLATELETS.RETICULATED NFR BLD AUTO: 3.3 % (ref 0–7)
POTASSIUM SERPL-SCNC: 5.1 MMOL/L (ref 3.5–5.1)
POTASSIUM SERPL-SCNC: 5.1 MMOL/L (ref 3.5–5.1)
RBC # BLD AUTO: 3.84 X10(6)UL
SODIUM SERPL-SCNC: 141 MMOL/L (ref 136–145)
WBC # BLD AUTO: 9.9 X10(3) UL (ref 4–11)

## 2024-12-08 PROCEDURE — 93975 VASCULAR STUDY: CPT | Performed by: INTERNAL MEDICINE

## 2024-12-08 PROCEDURE — 76775 US EXAM ABDO BACK WALL LIM: CPT | Performed by: INTERNAL MEDICINE

## 2024-12-08 PROCEDURE — 71045 X-RAY EXAM CHEST 1 VIEW: CPT | Performed by: INTERNAL MEDICINE

## 2024-12-08 PROCEDURE — 99233 SBSQ HOSP IP/OBS HIGH 50: CPT | Performed by: HOSPITALIST

## 2024-12-08 PROCEDURE — 99291 CRITICAL CARE FIRST HOUR: CPT | Performed by: INTERNAL MEDICINE

## 2024-12-08 PROCEDURE — 99233 SBSQ HOSP IP/OBS HIGH 50: CPT | Performed by: INTERNAL MEDICINE

## 2024-12-08 RX ORDER — LORAZEPAM 1 MG/1
1 TABLET ORAL 3 TIMES DAILY
Status: DISCONTINUED | OUTPATIENT
Start: 2024-12-08 | End: 2024-12-12

## 2024-12-08 NOTE — PLAN OF CARE
Patient briefly became bradycardic , down to high 30s, blood pressure unchanged. Patient bradycardic low 50s while coughing. Kassandra Dejanpilar GARCIAN aware, proceed with increased dose carvedilol.  Problem: Patient Centered Care  Goal: Patient preferences are identified and integrated in the patient's plan of care  Description: Interventions:  - What would you like us to know as we care for you? I work at [employer] and I am still very good friends with people from grade school! My brother, Osbaldo Prince, has been making decisions for me.  - Provide timely, complete, and accurate information to patient/family  - Incorporate patient and family knowledge, values, beliefs, and cultural backgrounds into the planning and delivery of care  - Encourage patient/family to participate in care and decision-making at the level they choose  - Honor patient and family perspectives and choices  Outcome: Progressing     Problem: Diabetes/Glucose Control  Goal: Glucose maintained within prescribed range  Description: INTERVENTIONS:  - Monitor Blood Glucose as ordered  - Assess for signs and symptoms of hyperglycemia and hypoglycemia  - Administer ordered medications to maintain glucose within target range  - Assess barriers to adequate nutritional intake and initiate nutrition consult as needed  - Instruct patient on self management of diabetes  Outcome: Progressing     Problem: CARDIOVASCULAR - ADULT  Goal: Maintains optimal cardiac output and hemodynamic stability  Description: INTERVENTIONS:  - Monitor vital signs, rhythm, and trends  - Monitor for bleeding, hypotension and signs of decreased cardiac output  - Evaluate effectiveness of vasoactive medications to optimize hemodynamic stability  - Monitor arterial and/or venous puncture sites for bleeding and/or hematoma  - Assess quality of pulses, skin color and temperature  - Assess for signs of decreased coronary artery perfusion - ex. Angina  - Evaluate fluid balance, assess  for edema, trend weights  Outcome: Progressing  Goal: Absence of cardiac arrhythmias or at baseline  Description: INTERVENTIONS:  - Continuous cardiac monitoring, monitor vital signs, obtain 12 lead EKG if indicated  - Evaluate effectiveness of antiarrhythmic and heart rate control medications as ordered  - Initiate emergency measures for life threatening arrhythmias  - Monitor electrolytes and administer replacement therapy as ordered  Outcome: Not Progressing     Problem: RESPIRATORY - ADULT  Goal: Achieves optimal ventilation and oxygenation  Description: INTERVENTIONS:  - Assess for changes in respiratory status  - Assess for changes in mentation and behavior  - Position to facilitate oxygenation and minimize respiratory effort  - Oxygen supplementation based on oxygen saturation or ABGs  - Provide Smoking Cessation handout, if applicable  - Encourage broncho-pulmonary hygiene including cough, deep breathe, Incentive Spirometry  - Assess the need for suctioning and perform as needed  - Assess and instruct to report SOB or any respiratory difficulty  - Respiratory Therapy support as indicated  - Manage/alleviate anxiety  - Monitor for signs/symptoms of CO2 retention  Outcome: Progressing     Problem: METABOLIC/FLUID AND ELECTROLYTES - ADULT  Goal: Glucose maintained within prescribed range  Description: INTERVENTIONS:  - Monitor Blood Glucose as ordered  - Assess for signs and symptoms of hyperglycemia and hypoglycemia  - Administer ordered medications to maintain glucose within target range  - Assess barriers to adequate nutritional intake and initiate nutrition consult as needed  - Instruct patient on self management of diabetes  Outcome: Progressing     Problem: NEUROLOGICAL - ADULT  Goal: Achieves stable or improved neurological status  Description: INTERVENTIONS  - Assess for and report changes in neurological status  - Initiate measures to prevent increased intracranial pressure  - Maintain blood pressure  and fluid volume within ordered parameters to optimize cerebral perfusion and minimize risk of hemorrhage  - Monitor temperature, glucose, and sodium. Initiate appropriate interventions as ordered  Outcome: Progressing

## 2024-12-08 NOTE — PLAN OF CARE
Problem: CARDIOVASCULAR - ADULT  Goal: Maintains optimal cardiac output and hemodynamic stability  Description: INTERVENTIONS:  - Monitor vital signs, rhythm, and trends  - Monitor for bleeding, hypotension and signs of decreased cardiac output  - Evaluate effectiveness of vasoactive medications to optimize hemodynamic stability  - Monitor arterial and/or venous puncture sites for bleeding and/or hematoma  - Assess quality of pulses, skin color and temperature  - Assess for signs of decreased coronary artery perfusion - ex. Angina  - Evaluate fluid balance, assess for edema, trend weights  Outcome: Not Progressing  Goal: Absence of cardiac arrhythmias or at baseline  Description: INTERVENTIONS:  - Continuous cardiac monitoring, monitor vital signs, obtain 12 lead EKG if indicated  - Evaluate effectiveness of antiarrhythmic and heart rate control medications as ordered  - Initiate emergency measures for life threatening arrhythmias  - Monitor electrolytes and administer replacement therapy as ordered  Outcome: Progressing     Problem: RESPIRATORY - ADULT  Goal: Achieves optimal ventilation and oxygenation  Description: INTERVENTIONS:  - Assess for changes in respiratory status  - Assess for changes in mentation and behavior  - Position to facilitate oxygenation and minimize respiratory effort  - Oxygen supplementation based on oxygen saturation or ABGs  - Provide Smoking Cessation handout, if applicable  - Encourage broncho-pulmonary hygiene including cough, deep breathe, Incentive Spirometry  - Assess the need for suctioning and perform as needed  - Assess and instruct to report SOB or any respiratory difficulty  - Respiratory Therapy support as indicated  - Manage/alleviate anxiety  - Monitor for signs/symptoms of CO2 retention  Outcome: Not Progressing     Problem: SKIN/TISSUE INTEGRITY - ADULT  Goal: Skin integrity remains intact  Description: INTERVENTIONS  - Assess and document risk factors for pressure ulcer  development  - Assess and document skin integrity  - Monitor for areas of redness and/or skin breakdown  - Initiate interventions, skin care algorithm/standards of care as needed  Outcome: Progressing  Goal: Incision(s), wounds(s) or drain site(s) healing without S/S of infection  Description: INTERVENTIONS:  - Assess and document risk factors for pressure ulcer development  - Assess and document skin integrity  - Assess and document dressing/incision, wound bed, drain sites and surrounding tissue  - Implement wound care per orders  - Initiate isolation precautions as appropriate  - Initiate Pressure Ulcer prevention bundle as indicated  Outcome: Progressing  Goal: Oral mucous membranes remain intact  Description: INTERVENTIONS  - Assess oral mucosa and hygiene practices  - Implement preventative oral hygiene regimen  - Implement oral medicated treatments as ordered  Outcome: Progressing     Problem: MUSCULOSKELETAL - ADULT  Goal: Return mobility to safest level of function  Description: INTERVENTIONS:  - Assess patient stability and activity tolerance for standing, transferring and ambulating w/ or w/o assistive devices  - Assist with transfers and ambulation using safe patient handling equipment as needed  - Ensure adequate protection for wounds/incisions during mobilization  - Obtain PT/OT consults as needed  - Advance activity as appropriate  - Communicate ordered activity level and limitations with patient/family  Outcome: Not Progressing     Problem: NEUROLOGICAL - ADULT  Goal: Achieves stable or improved neurological status  Description: INTERVENTIONS  - Assess for and report changes in neurological status  - Initiate measures to prevent increased intracranial pressure  - Maintain blood pressure and fluid volume within ordered parameters to optimize cerebral perfusion and minimize risk of hemorrhage  - Monitor temperature, glucose, and sodium. Initiate appropriate interventions as ordered  Outcome:  Progressing     Problem: PAIN - ADULT  Goal: Verbalizes/displays adequate comfort level or patient's stated pain goal  Description: INTERVENTIONS:  - Encourage pt to monitor pain and request assistance  - Assess pain using appropriate pain scale  - Administer analgesics based on type and severity of pain and evaluate response  - Implement non-pharmacological measures as appropriate and evaluate response  - Consider cultural and social influences on pain and pain management  - Manage/alleviate anxiety  - Utilize distraction and/or relaxation techniques  - Monitor for opioid side effects  - Notify MD/LIP if interventions unsuccessful or patient reports new pain  - Anticipate increased pain with activity and pre-medicate as appropriate  Outcome: Not Progressing

## 2024-12-08 NOTE — PLAN OF CARE
Problem: RESPIRATORY - ADULT  Goal: Achieves optimal ventilation and oxygenation  Description: INTERVENTIONS:  - Assess for changes in respiratory status  - Assess for changes in mentation and behavior  - Position to facilitate oxygenation and minimize respiratory effort  - Oxygen supplementation based on oxygen saturation or ABGs  - Provide Smoking Cessation handout, if applicable  - Encourage broncho-pulmonary hygiene including cough, deep breathe, Incentive Spirometry  - Assess the need for suctioning and perform as needed  - Assess and instruct to report SOB or any respiratory difficulty  - Respiratory Therapy support as indicated  - Manage/alleviate anxiety  - Monitor for signs/symptoms of CO2 retention  Outcome: Progressing     Pt received on MV PRVC/AC 18/550/+5/30%  Suctioned moderate thick tan/pink tinged secretion from the trach.  Trach Portex size 8 sutured. Oozing secretion noted around stoma.   Neb given as scheduled.   RT continue to monitor.

## 2024-12-08 NOTE — PROGRESS NOTES
[facility]  part of [facility]    Progress Note      Assessment and Plan:   1.  Dissection of ascending aorta-postoperative course is complicated by refractory hypertension.  The patient is currently on innumerable agents all at maximal dose.  Carvedilol could potentially be increased to 37.5 mg twice daily.    Recommendations:  1.  As per CV surgery and cardiology status postrepair  2.  Discharge planning.  3.  Continue multiple antihypertensives including hydralazine and isosorbide furosemide clonidine carvedilol and amlodipine.  Discussed increasing carvedilol to 37.5 twice daily with AD IBARRA and they will get back to me.    2.  Respiratory failure-patient with bilateral infiltrates and aspiration phenomenon.  The patient had failed her spontaneous breathing trials on multiple occasions and now has tracheostomy tube in place.  Little change clinically otherwise.  Unfortunately, when the patient was turned overnight, she regurgitated yet again.    Recommendations:  1.  Daily spontaneous breathing trial  2.  Ongoing empiric antibiotic  3.  Lasix  4.  Morning chest x-ray  5.  Can try low-dose Ativan in order to taper the propofol  6.  Hold the tube feeds for an hour prior to turning    3.  Morbid obesity    4.  DVT prophylaxis-heparin    5.  CODE STATUS-full    6.  Hypertension-hydralazine added.    7.  NAIMA-improved.  Diuretic accelerated now by nephrology.    Subjective:   Alisha Wilde is a(n) 59 year old female who is sedated on ventilator, but does arouse to stimulation and moves extremities.    Objective:   Blood pressure 132/60, pulse 70, temperature 98.5 °F (36.9 °C), temperature source Axillary, resp. rate 18, height 5' 5\" (1.651 m), weight 288 lb 12.8 oz (131 kg), SpO2 100%, not currently breastfeeding.    Physical Exam sedate obese black female  HEENT examination is unremarkable with pupils equal round and reactive to light and accommodation.   Neck without adenopathy, thyromegaly, JVD  nor bruit.   Lungs diminished to auscultation and percussion.  Cardiac regular rate and rhythm no murmur.   Abdomen nontender, without hepatosplenomegaly and no mass appreciable.   Extremities without clubbing cyanosis nor edema.   Neurologic sedate on ventilator.  Skin without gross abnormality     Results:     Lab Results   Component Value Date    WBC 9.9 12/08/2024    HGB 10.9 12/08/2024    HCT 33.5 12/08/2024    .0 12/08/2024    CREATSERUM 1.48 12/08/2024    BUN 38 12/08/2024     12/08/2024    K 5.1 12/08/2024    K 5.1 12/08/2024     12/08/2024    CO2 18.0 12/08/2024     12/08/2024    CA 8.4 12/08/2024    ALB 3.8 12/08/2024    MG 2.2 12/08/2024    PHOS 4.2 12/08/2024       Fantasma Spicer MD  Medical Director, Critical Care, [facility]  Medical Director, Health system  Pager: [phone]  >35 min crit care

## 2024-12-08 NOTE — PROGRESS NOTES
At start of shift patient hypertensive lynn reading systolic 170s, BP cuff reading systolic in 160s. Exhausted all options, restarted propofol gtt. Cardiology, CV surgery, and Critical Care teams aware. Attempting to wean sedation and cardene gtt after medications given via gtube.

## 2024-12-08 NOTE — PROGRESS NOTES
[facility]  part of [facility]    Progress Note    Alisha Wilde Patient Status:  Inpatient    10/25/1965 MRN [id number]   Location [facility] 2W/SW Attending Missy Paulson MD   Hosp Day # 16 PCP Bridger Avendano MD     Subjective:  Pt currently sedated w/Trach on full vent support. Opens eyes to verbal stimuli this morning. Per bedside RN-when off sedation yesterday- was able to move all ext/nod head to simple questions/follow simple commands. Per RN, did get agitated overnight w/increase BP requiring resuming sedation. Plan to turn off sedation again today and weaning trials.   No visitors at bedside.     Objective:  BP (!) 161/69 (BP Location: Left arm)   Pulse 81   Temp 98.5 °F (36.9 °C) (Axillary)   Resp 21   Ht 5' 5\" (1.651 m)   Wt 288 lb 12.8 oz (131 kg)   SpO2 100%   BMI 48.06 kg/m²     Temp (24hrs), Av °F (37.2 °C), Min:97.9 °F (36.6 °C), Max:100.3 °F (37.9 °C)      Intake/Output:    Intake/Output Summary (Last 24 hours) at 2024 0942  Last data filed at 2024 0500  Gross per 24 hour   Intake 3700.13 ml   Output 2925 ml   Net 775.13 ml       Wt Readings from Last 3 Encounters:   24 288 lb 12.8 oz (131 kg)   10/24/24 254 lb (115.2 kg)   24 249 lb (112.9 kg)       Allergies:  Allergies[1]    Labs:  Lab Results   Component Value Date    CREATSERUM 1.48 2024    BUN 38 2024     2024    K 5.1 2024    K 5.1 2024     2024    CO2 18.0 2024     2024    CA 8.4 2024    ALB 3.8 2024    MG 2.2 2024    PHOS 4.2 2024       Physical Exam:  Blood pressure (!) 161/69, pulse 81, temperature 98.5 °F (36.9 °C), temperature source Axillary, resp. rate 21, height 5' 5\" (1.651 m), weight 288 lb 12.8 oz (131 kg), SpO2 100%, not currently breastfeeding.  Neck: Trach in place  Lungs: Rhonchi bilaterally anteriorly/laterally   Heart: RRR, S1, S2  Abdomen: Soft/Obese, NT/ND, BS+x 4/+BM  (s)  Extremities: Warm, dry, 1-2+ generalized UE & LE edema bilat  Pulses: 2+ bilat DP  Skin: sternotomy incision CATHIE=CDI-healing well  Neurological:  sedated- opens eyes to verbal stimuli    Assessment/Plan:  S/P EMERGENT AORTIC DISSECTION REPAIR POD # 16  Respiratory Failure w/multiple failed SBT prior to trach placement -currently on full vent support-continue daily weaning trials. Re-intubated on 11/22 per Pulm after emesis episode/ETT removal due to emesis content noted in airway.    S/P Trach placement per ENT on 12/4  Completed ABX course for suspected aspiration pneumonia.   CT Chest/Abdomen/Pelvis (non-contrast) 11/26=no obstruction noted/stable. +BM (s) post op  Hx: HTN: on multiple antihypertensives including IV Cardene-wean as able: SBP goal= <130/MAP >70. Mgt per Cards. New A-line placed 12/2  New PICC placed 12/2  Pain meds as needed  Scds/Heparin SubQ prophylaxis DVT prevention. HIPA 11/25=negative. Plts continue >100,000  Continue ICU status  Hx: CKD. Expected post op volume overload w/mgt per Nephrology (consulted on 11/23) for NAIMA. Limit/avoid nephrotoxic meds: Lasix BID per Nephrology. Webb remains for close I/O monitoring & immobility. Kidney US w/doppler today.   Expected post op anemia: w/o clinical significance/stable. May be slightly diluted due to volume overload.   Hx: Morbid obesity w/BMI >40-plan for RD to see when appropriate  Nutrition per TF via PEG-currently on hold for Kidney US. PEG placed 12/5 per GI.    Abdominal X-ray 12/4=Gas- has had post op BMs- continues on Senna/Colace  Hx: ETOH abuse-monitor for withdrawals. CIWA protocol PRN   Pt on Ativan TID/Seroquel BID/has been receiving Norco PRN  Discharge planning: pt lives alone and has supportive family.   Anticipate LTAC at discharge: referrals sent proactively by  on 11/29 for LTAC. Tentatively ready once BP improves.      Plan of care discussed w/RN  Mariela Noel, KATHERINE  12/8/2024  9:42 AM       [1]    Allergies  Allergen Reactions    Atorvastatin MYALGIA    Pravastatin MYALGIA    Rosuvastatin MYALGIA    Seasonal Runny nose

## 2024-12-08 NOTE — PROGRESS NOTES
Cardiology Progress Note    Alisha Wilde Patient Status:  Inpatient    10/25/1965 MRN [id number]   Location [facility] 2W/SW Attending Missy Paulson MD   Hosp Day # 16 PCP Bridger Avendano MD       Subjective:     On sedation. On trach/vented. No acute overnight events.    Objective:   Temp: 98.5 °F (36.9 °C)  Pulse: 55  Resp: 28  BP: 137/65  FiO2 (%): 30 %    Intake/Output:     Intake/Output Summary (Last 24 hours) at 2024 0659  Last data filed at 2024 0500  Gross per 24 hour   Intake 3780.13 ml   Output 2925 ml   Net 855.13 ml       Last 3 Weights   24 0400 288 lb 12.8 oz (131 kg)   24 0551 285 lb 4.4 oz (129.4 kg)   24 0426 282 lb 3 oz (128 kg)   24 0322 291 lb 0.1 oz (132 kg)   24 0300 285 lb 7.9 oz (129.5 kg)   24 0600 288 lb 5.8 oz (130.8 kg)   24 0300 268 lb 8.3 oz (121.8 kg)   24 0600 266 lb 5.1 oz (120.8 kg)   24 0600 284 lb 9.8 oz (129.1 kg)   24 0621 282 lb 3 oz (128 kg)   24 0800 275 lb 9.2 oz (125 kg)   24 0419 258 lb 2.5 oz (117.1 kg)   24 0200 258 lb 2.5 oz (117.1 kg)   10/24/24 1519 254 lb (115.2 kg)   24 1522 249 lb (112.9 kg)         Physical Exam:     General: Sedated. Stable.   HEENT: Normocephalic, anicteric sclera. Trach in place.   Cardiac: Regular rate and rhythm. S1, S2 normal. No murmur, pericardial rub, S3.  Lungs: Clear b/l - vented  Abdomen: Soft, non-tender.   Extremities: Without clubbing, cyanosis. Generalized non-pitting edema.   Neurologic: sedated.   Skin: Warm and dry.     Laboratory/Data:    Labs:         Recent Labs   Lab 24  0432 24  0312 24  0510 24  1728 24  0416 24  0534 24  0627   WBC 10.4 11.5* 11.9* 13.4* 12.2* 9.5 8.5   HGB 8.3* 8.0* 7.7* 8.7* 9.0* 8.5* 8.4*   MCV 81.2 83.8 83.7 84.8 83.0 83.8 87.3   .0 194.0 238.0 216.0 224.0 264.0 194.0   BAND 10 3 4  --   --   --   --    INR  --   --  1.17  --   --   --   --         Recent Labs   Lab 12/02/24  0434 12/02/24  0636 12/03/24  0312 12/04/24  0510 12/04/24  1728 12/05/24  0416 12/06/24  0534 12/07/24  0627     --  140  140 139 141 142 145 145   K  --    < > 4.5  4.5 4.4 4.5 4.0 3.6 3.8     --  113*  113* 111 114* 114* 116* 117*   CO2 20.0*  --  19.0*  19.0* 17.0* 17.0* 17.0* 17.0* 18.0*   BUN  --    < > 33*  33* 42* 44* 41* 38* 36*   CREATSERUM 1.46*  --  1.60*  1.60* 1.65* 1.78* 1.61* 1.46* 1.41*   CA 9.1  --  8.9  8.9 9.0 9.2 9.1 8.8 8.8   MG 2.2  --  2.3 2.5  --   --  2.7* 2.5   PHOS 5.2*  --  5.9* 5.3*  --   --  3.6 4.1   *  --  130*  130* 128* 118* 120* 119* 113*    < > = values in this interval not displayed.       Recent Labs   Lab 12/03/24  0312 12/04/24  0510 12/05/24  0416 12/06/24  0534 12/07/24  0627   ALT 32  --  33  --   --    AST 25  --  31  --   --    ALB 3.4 3.5 3.6 3.4 3.3       No results for input(s): \"TROP\" in the last 168 hours.    Allergies:   Allergies[1]    Medications:  Current Facility-Administered Medications   Medication Dose Route Frequency    furosemide (Lasix) 10 mg/mL injection 40 mg  40 mg Intravenous BID (Diuretic)    fentaNYL (Sublimaze) 50 mcg/mL injection 50 mcg  50 mcg Intravenous Q3H PRN    LORazepam (Ativan) tab 1 mg  1 mg Per G Tube BID PRN    acetaminophen (Tylenol) 160 MG/5ML oral liquid 650 mg  650 mg Oral Q6H PRN    aspirin chewable tab 81 mg  81 mg Peg Tube Daily    carvedilol (Coreg) tab 25 mg  25 mg Oral BID with meals    QUEtiapine (SEROquel) tab 50 mg  50 mg Per G Tube BID    niCARdipine (carDENE) 125 mg in sodium chloride 0.9% 250 mL infusion  5-15 mg/hr Intravenous Continuous    senna (Senokot) 8.8 MG/5ML oral syrup 8.8 mg  5 mL Per G Tube BID    cloNIDine (Catapres) tab 0.3 mg  0.3 mg Oral BID    hydrALAzine (Apresoline) 20 mg/mL injection 10 mg  10 mg Intravenous Q6H PRN    docusate (Colace) 50 MG/5ML oral solution 100 mg  100 mg Oral BID    isosorbide dinitrate (Isordil) tab 40 mg  40 mg Per NG  Tube TID (Nitrates)    hydrALAZINE (Apresoline) tab 150 mg  150 mg Oral Q8H GABRIEL    amLODIPine (Norvasc) tab 10 mg  10 mg Oral Daily    sodium chloride 3 % nebulizer solution 3 mL  3 mL Nebulization 4 times per day    ipratropium-albuterol (Duoneb) 0.5-2.5 (3) MG/3ML inhalation solution 3 mL  3 mL Nebulization Q6H PRN    albuterol (Ventolin) (2.5 MG/3ML) 0.083% nebulizer solution 2.5 mg  2.5 mg Nebulization 4 times per day    HYDROcodone-acetaminophen (Norco) 5-325 MG per tab 2 tablet  2 tablet Oral Q4H PRN    heparin (Porcine) 5000 UNIT/ML injection 5,000 Units  5,000 Units Subcutaneous Q8H GABRIEL    melatonin tab 3 mg  3 mg Oral Nightly PRN    bisacodyl (Dulcolax) 10 MG rectal suppository 10 mg  10 mg Rectal Daily PRN    dexmedeTOMIDine in sodium chloride 0.9% (Precedex) 400 mcg/100mL infusion premix  0.2-1.5 mcg/kg/hr (Dosing Weight) Intravenous Continuous    potassium chloride 20 mEq/100mL IVPB premix 20 mEq  20 mEq Intravenous PRN    Or    potassium chloride 40 mEq/100mL IVPB premix (central line) 40 mEq  40 mEq Intravenous PRN    magnesium sulfate in dextrose 5% 1 g/100mL infusion premix 1 g  1 g Intravenous PRN    magnesium sulfate in sterile water for injection 2 g/50mL IVPB premix 2 g  2 g Intravenous PRN    chlorhexidine gluconate (Peridex) 0.12 % oral solution 15 mL  15 mL Mouth/Throat BID    morphINE PF 2 MG/ML injection 2 mg  2 mg Intravenous Q2H PRN    propofol (Diprivan) 10 mg/mL infusion premix  5-50 mcg/kg/min (Dosing Weight) Intravenous Continuous    fentaNYL (Sublimaze) 50 mcg/mL injection 25 mcg  25 mcg Intravenous Q3H PRN    famotidine (Pepcid) 20 mg/2mL injection 20 mg  20 mg Intravenous Daily         Assessment:  Type A aortic dissection s/p emergent repair 11/22/24 c/b Acute hypoxic respiratory failure c/b aspiration requiring re-intubation and eventual trach 12/4  NAIMA on CKD - improving   Etoh abuse hx   HTN     Plan:  ECHO 11/23 w/ hyperdynamic LVEF   Current anti-htn regimen:  Coreg 25 mg bid    Hydralazine/isosorbide dinitrate 150-40 mg tid   Amlodipine 10 mg every day   Clonidine 0.3 mg tid   Cardene gtt on-going - wean as tolerated  BP continues to be very elevated. CTA chest/abd/pelvis 11/21 reviewed - right renal artery originates from true lumen. Left renal origin partially occupied by false lumen? Unable to review op report for further info. Renal artery US would likely be significantly limited due to body habitus - pending.   Volume up - lasix 40 mg every day - per nephrology. Hopefully ace-I/arb thereafter.   CBC/BMP this AM.  Will follow.         David Atwood DO  Cardiologist  [facility]  12/8/2024 7:00 AM      Note to the patient: The 21st Century Cures Act makes medical notes like these available to patients in the interest of transparency. However, be advised that this is a medical document. It is intended as peer to peer communication. It is written in medical language and may contain abbreviations or verbiage that are unfamiliar. It may appear blunt or direct. Medical documents are intended to carry relevant information, facts as evident, and clinical opinion of the practitioner.     Disclaimer: Components of this note were documented using voice recognition system and are subject to errors not corrected at proofreading. Contact the author of this note for any clarifications.          [1]   Allergies  Allergen Reactions    Atorvastatin MYALGIA    Pravastatin MYALGIA    Rosuvastatin MYALGIA    Seasonal Runny nose

## 2024-12-08 NOTE — PROGRESS NOTES
[facility]  Nephrology Daily Progress Note    Alisha Wilde  [id number]  59 year old      HPI:   Alisha Wilde is a 59 year old female.  Sedated but does awaken off sedation.  Resistant HTN on cardene drip.  FiO2 30%.       ROS:     Unable.     PHYSICAL EXAM:   Temp:  [97.9 °F (36.6 °C)-100.3 °F (37.9 °C)] 98.5 °F (36.9 °C)  Pulse:  [] 70  Resp:  [17-29] 18  BP: (122-177)/(58-88) 132/60  SpO2:  [98 %-100 %] 100 %  FiO2 (%):  [30 %] 30 %  Patient Weight for the past 72 hrs:   Weight   12/06/24 0551 285 lb 4.4 oz (129.4 kg)   12/07/24 0400 288 lb 12.8 oz (131 kg)       Constitutional: appears well hydrated alert and responsive no acute distress noted  Neck/Thyroid: neck is supple without adenopathy  Lymphatic: no abnormal cervical, supraclavicular or axillary adenopathy is noted  Respiratory: normal to inspection lungs are clear to auscultation bilaterally normal respiratory effort  Cardiovascular: regular rate and rhythm no murmurs, gallups, or rubs  Abdomen: soft non-tender non-distended no organomegaly noted no masses  Musculoskeletal: full ROM all extremities good strength  no deformities  Extremities: 1 + edema.    Neurological: exam appropriate for age reflexes and motor skills appropriate for age    Labs:  Lab Results   Component Value Date    WBC 9.9 12/08/2024    HGB 10.9 12/08/2024    HCT 33.5 12/08/2024    .0 12/08/2024    CREATSERUM 1.48 12/08/2024    BUN 38 12/08/2024     12/08/2024    K 5.1 12/08/2024    K 5.1 12/08/2024     12/08/2024    CO2 18.0 12/08/2024     12/08/2024    CA 8.4 12/08/2024    ALB 3.8 12/08/2024    MG 2.2 12/08/2024    PHOS 4.2 12/08/2024     Recent Labs   Lab 12/06/24  0534 12/07/24  0627 12/08/24  0637   WBC 9.5 8.5 9.9   HGB 8.5* 8.4* 10.9*   HCT 26.3* 26.2* 33.5*   .0 194.0 270.0     Recent Labs   Lab 12/03/24  0312 12/04/24  0510 12/05/24  0416 12/06/24  0534 12/07/24  0627 12/08/24  0637   *  130*   < > 120* 119*  113* 158*   BUN 33*  33*   < > 41* 38* 36* 38*   CREATSERUM 1.60*  1.60*   < > 1.61* 1.46* 1.41* 1.48*   CA 8.9  8.9   < > 9.1 8.8 8.8 8.4*   ALB 3.4   < > 3.6 3.4 3.3 3.8     140   < > 142 145 145 141   K 4.5  4.5   < > 4.0 3.6 3.8 5.1  5.1   *  113*   < > 114* 116* 117* 116*   CO2 19.0*  19.0*   < > 17.0* 17.0* 18.0* 18.0*   ALKPHO 86  --  86  --   --   --    AST 25  --  31  --   --   --    ALT 32  --  33  --   --   --    BILT 0.3  --  0.4  --   --   --    TP 6.3  --  6.3  --   --   --    PHOS 5.9*   < >  --  3.6 4.1 4.2    < > = values in this interval not displayed.       Imaging  XR CHEST AP PORTABLE  (CPT=71045)    Result Date: 12/8/2024  CONCLUSION:   Mildly improved patchy bilateral perihilar opacities.      Dictated by (CST): Karen Ureña MD on 12/08/2024 at 7:40 AM     Finalized by (CST): Karen Ureña MD on 12/08/2024 at 7:41 AM             Medications:    Current Facility-Administered Medications:     LORazepam (Ativan) tab 1 mg, 1 mg, Per G Tube, TID    furosemide (Lasix) 10 mg/mL injection 40 mg, 40 mg, Intravenous, BID (Diuretic)    fentaNYL (Sublimaze) 50 mcg/mL injection 50 mcg, 50 mcg, Intravenous, Q3H PRN    acetaminophen (Tylenol) 160 MG/5ML oral liquid 650 mg, 650 mg, Oral, Q6H PRN    aspirin chewable tab 81 mg, 81 mg, Peg Tube, Daily    carvedilol (Coreg) tab 25 mg, 25 mg, Oral, BID with meals    QUEtiapine (SEROquel) tab 50 mg, 50 mg, Per G Tube, BID    niCARdipine (carDENE) 125 mg in sodium chloride 0.9% 250 mL infusion, 5-15 mg/hr, Intravenous, Continuous    senna (Senokot) 8.8 MG/5ML oral syrup 8.8 mg, 5 mL, Per G Tube, BID    [COMPLETED] cloNIDine (Catapres) tab 0.3 mg, 0.3 mg, Oral, QID **FOLLOWED BY** [COMPLETED] cloNIDine (Catapres) tab 0.3 mg, 0.3 mg, Oral, TID **FOLLOWED BY** cloNIDine (Catapres) tab 0.3 mg, 0.3 mg, Oral, BID    hydrALAzine (Apresoline) 20 mg/mL injection 10 mg, 10 mg, Intravenous, Q6H PRN    docusate (Colace) 50 MG/5ML oral solution 100 mg,  100 mg, Oral, BID    isosorbide dinitrate (Isordil) tab 40 mg, 40 mg, Per NG Tube, TID (Nitrates)    hydrALAZINE (Apresoline) tab 150 mg, 150 mg, Oral, Q8H GABRIEL    amLODIPine (Norvasc) tab 10 mg, 10 mg, Oral, Daily    sodium chloride 3 % nebulizer solution 3 mL, 3 mL, Nebulization, 4 times per day    ipratropium-albuterol (Duoneb) 0.5-2.5 (3) MG/3ML inhalation solution 3 mL, 3 mL, Nebulization, Q6H PRN    albuterol (Ventolin) (2.5 MG/3ML) 0.083% nebulizer solution 2.5 mg, 2.5 mg, Nebulization, 4 times per day    HYDROcodone-acetaminophen (Norco) 5-325 MG per tab 2 tablet, 2 tablet, Oral, Q4H PRN    heparin (Porcine) 5000 UNIT/ML injection 5,000 Units, 5,000 Units, Subcutaneous, Q8H GABRIEL    melatonin tab 3 mg, 3 mg, Oral, Nightly PRN    bisacodyl (Dulcolax) 10 MG rectal suppository 10 mg, 10 mg, Rectal, Daily PRN    dexmedeTOMIDine in sodium chloride 0.9% (Precedex) 400 mcg/100mL infusion premix, 0.2-1.5 mcg/kg/hr (Dosing Weight), Intravenous, Continuous    potassium chloride 20 mEq/100mL IVPB premix 20 mEq, 20 mEq, Intravenous, PRN **OR** potassium chloride 40 mEq/100mL IVPB premix (central line) 40 mEq, 40 mEq, Intravenous, PRN    magnesium sulfate in dextrose 5% 1 g/100mL infusion premix 1 g, 1 g, Intravenous, PRN    magnesium sulfate in sterile water for injection 2 g/50mL IVPB premix 2 g, 2 g, Intravenous, PRN    chlorhexidine gluconate (Peridex) 0.12 % oral solution 15 mL, 15 mL, Mouth/Throat, BID    morphINE PF 2 MG/ML injection 2 mg, 2 mg, Intravenous, Q2H PRN **OR** [DISCONTINUED] morphINE PF 4 MG/ML injection 4 mg, 4 mg, Intravenous, Q2H PRN    propofol (Diprivan) 10 mg/mL infusion premix, 5-50 mcg/kg/min (Dosing Weight), Intravenous, Continuous    fentaNYL (Sublimaze) 50 mcg/mL injection 25 mcg, 25 mcg, Intravenous, Q3H PRN    [DISCONTINUED] famotidine (Pepcid) tab 20 mg, 20 mg, Oral, Daily **OR** famotidine (Pepcid) 20 mg/2mL injection 20 mg, 20 mg, Intravenous,  Daily    Allergies:  Allergies[1]    Input/Output:    Intake/Output Summary (Last 24 hours) at 12/8/2024 1300  Last data filed at 12/8/2024 0800  Gross per 24 hour   Intake 2256.13 ml   Output 2405 ml   Net -148.87 ml          ASSESSMENT/PLAN:   Assessment   Patient Active Problem List   Diagnosis    Hypercholesteremia    Alcoholism (HCC)    Essential hypertension    Vitamin D deficiency    Adjustment disorder with mixed anxiety and depressed mood    Controlled type 2 diabetes mellitus without complication, without long-term current use of insulin (HCC)    Obesity (BMI 30-39.9)    Neck mass    Polyp of colon    Flat feet, bilateral    Hypokalemia    Myalgia due to statin    Age-related nuclear cataract of both eyes    Positive for microalbuminuria    Dissection of ascending aorta (HCC)    NAIMA (acute kidney injury) ([facility])    Stage 3 chronic kidney disease (HCC)    Acute respiratory failure with hypoxia ([facility])       UO good 2925 ml.  On Lasix bid.  Creatinine 1,48.  CO2 18.  CXR mild improvement.  Still with resistant HTN.  Scheduled for Doppler renal ultrasound today.  CTA on admission showed 50% narrowing of L Renal.  R/O progression.  If negative would add Minoxidil.  Discussed with RN.              12/8/2024  Juan Franco MD               [1]   Allergies  Allergen Reactions    Atorvastatin MYALGIA    Pravastatin MYALGIA    Rosuvastatin MYALGIA    Seasonal Runny nose

## 2024-12-09 ENCOUNTER — APPOINTMENT (OUTPATIENT)
Dept: CT IMAGING | Facility: HOSPITAL | Age: 59
DRG: 003 | End: 2024-12-09
Attending: NURSE PRACTITIONER
Payer: COMMERCIAL

## 2024-12-09 ENCOUNTER — APPOINTMENT (OUTPATIENT)
Dept: GENERAL RADIOLOGY | Facility: HOSPITAL | Age: 59
DRG: 003 | End: 2024-12-09
Attending: INTERNAL MEDICINE
Payer: COMMERCIAL

## 2024-12-09 ENCOUNTER — APPOINTMENT (OUTPATIENT)
Dept: CT IMAGING | Facility: HOSPITAL | Age: 59
End: 2024-12-09
Attending: NURSE PRACTITIONER
Payer: COMMERCIAL

## 2024-12-09 ENCOUNTER — APPOINTMENT (OUTPATIENT)
Dept: GENERAL RADIOLOGY | Facility: HOSPITAL | Age: 59
End: 2024-12-09
Attending: INTERNAL MEDICINE
Payer: COMMERCIAL

## 2024-12-09 PROBLEM — E87.70 HYPERVOLEMIA: Status: ACTIVE | Noted: 2024-12-09

## 2024-12-09 LAB
ALBUMIN SERPL-MCNC: 3.7 G/DL (ref 3.2–4.8)
ALBUMIN/GLOB SERPL: 1.2 {RATIO} (ref 1–2)
ALP LIVER SERPL-CCNC: 101 U/L
ALT SERPL-CCNC: 25 U/L
ALT SERPL-CCNC: 28 U/L
ANION GAP SERPL CALC-SCNC: 1 MMOL/L (ref 0–18)
ANION GAP SERPL CALC-SCNC: 11 MMOL/L (ref 0–18)
AST SERPL-CCNC: 24 U/L (ref ?–34)
BASOPHILS # BLD AUTO: 0.05 X10(3) UL (ref 0–0.2)
BASOPHILS NFR BLD AUTO: 0.5 %
BILIRUB SERPL-MCNC: 0.3 MG/DL (ref 0.2–1.1)
BUN BLD-MCNC: 33 MG/DL (ref 9–23)
BUN BLD-MCNC: 37 MG/DL (ref 9–23)
BUN/CREAT SERPL: 21.6 (ref 10–20)
CALCIUM BLD-MCNC: 9.1 MG/DL (ref 8.7–10.4)
CALCIUM BLD-MCNC: 9.2 MG/DL (ref 8.7–10.4)
CHLORIDE SERPL-SCNC: 112 MMOL/L (ref 98–112)
CHLORIDE SERPL-SCNC: 115 MMOL/L (ref 98–112)
CO2 SERPL-SCNC: 17 MMOL/L (ref 21–32)
CO2 SERPL-SCNC: 28 MMOL/L (ref 21–32)
CREAT BLD-MCNC: 1.51 MG/DL
CREAT BLD-MCNC: 1.53 MG/DL
DEPRECATED RDW RBC AUTO: 54.8 FL (ref 35.1–46.3)
EGFRCR SERPLBLD CKD-EPI 2021: 38 ML/MIN/1.73M2 (ref 60–?)
EGFRCR SERPLBLD CKD-EPI 2021: 39 ML/MIN/1.73M2 (ref 60–?)
EOSINOPHIL # BLD AUTO: 0.51 X10(3) UL (ref 0–0.7)
EOSINOPHIL NFR BLD AUTO: 5 %
ERYTHROCYTE [DISTWIDTH] IN BLOOD BY AUTOMATED COUNT: 17.3 % (ref 11–15)
GLOBULIN PLAS-MCNC: 3 G/DL (ref 2–3.5)
GLUCOSE BLD-MCNC: 110 MG/DL (ref 70–99)
GLUCOSE BLD-MCNC: 132 MG/DL (ref 70–99)
GLUCOSE BLDC GLUCOMTR-MCNC: 144 MG/DL (ref 70–99)
GLUCOSE BLDC GLUCOMTR-MCNC: 145 MG/DL (ref 70–99)
HCT VFR BLD AUTO: 27.7 %
HGB BLD-MCNC: 9 G/DL
IMM GRANULOCYTES # BLD AUTO: 0.37 X10(3) UL (ref 0–1)
IMM GRANULOCYTES NFR BLD: 3.6 %
LYMPHOCYTES # BLD AUTO: 1.04 X10(3) UL (ref 1–4)
LYMPHOCYTES NFR BLD AUTO: 10.2 %
MAGNESIUM SERPL-MCNC: 2.5 MG/DL (ref 1.6–2.6)
MCH RBC QN AUTO: 28.3 PG (ref 26–34)
MCHC RBC AUTO-ENTMCNC: 32.5 G/DL (ref 31–37)
MCV RBC AUTO: 87.1 FL
MONOCYTES # BLD AUTO: 1.21 X10(3) UL (ref 0.1–1)
MONOCYTES NFR BLD AUTO: 11.8 %
NEUTROPHILS # BLD AUTO: 7.06 X10 (3) UL (ref 1.5–7.7)
NEUTROPHILS # BLD AUTO: 7.06 X10(3) UL (ref 1.5–7.7)
NEUTROPHILS NFR BLD AUTO: 68.9 %
OSMOLALITY SERPL CALC.SUM OF ELEC: 300 MOSM/KG (ref 275–295)
PHOSPHATE SERPL-MCNC: 4.2 MG/DL (ref 2.4–5.1)
PLATELET # BLD AUTO: 165 10(3)UL (ref 150–450)
POTASSIUM SERPL-SCNC: 3.9 MMOL/L (ref 3.5–5.1)
POTASSIUM SERPL-SCNC: 3.9 MMOL/L (ref 3.5–5.1)
PROT SERPL-MCNC: 6.7 G/DL (ref 5.7–8.2)
RBC # BLD AUTO: 3.18 X10(6)UL
SODIUM SERPL-SCNC: 141 MMOL/L (ref 136–145)
SODIUM SERPL-SCNC: 143 MMOL/L (ref 136–145)
WBC # BLD AUTO: 10.2 X10(3) UL (ref 4–11)

## 2024-12-09 PROCEDURE — 71045 X-RAY EXAM CHEST 1 VIEW: CPT | Performed by: INTERNAL MEDICINE

## 2024-12-09 PROCEDURE — 99232 SBSQ HOSP IP/OBS MODERATE 35: CPT | Performed by: REGISTERED NURSE

## 2024-12-09 PROCEDURE — 99291 CRITICAL CARE FIRST HOUR: CPT | Performed by: INTERNAL MEDICINE

## 2024-12-09 PROCEDURE — 99233 SBSQ HOSP IP/OBS HIGH 50: CPT | Performed by: INTERNAL MEDICINE

## 2024-12-09 PROCEDURE — 99233 SBSQ HOSP IP/OBS HIGH 50: CPT | Performed by: HOSPITALIST

## 2024-12-09 PROCEDURE — 74018 RADEX ABDOMEN 1 VIEW: CPT | Performed by: INTERNAL MEDICINE

## 2024-12-09 PROCEDURE — 74176 CT ABD & PELVIS W/O CONTRAST: CPT | Performed by: NURSE PRACTITIONER

## 2024-12-09 RX ORDER — ALBUTEROL SULFATE 0.83 MG/ML
2.5 SOLUTION RESPIRATORY (INHALATION) EVERY 6 HOURS PRN
Status: DISCONTINUED | OUTPATIENT
Start: 2024-12-09 | End: 2024-12-23

## 2024-12-09 RX ORDER — CLONIDINE HYDROCHLORIDE 0.1 MG/1
0.3 TABLET ORAL 3 TIMES DAILY
Status: DISCONTINUED | OUTPATIENT
Start: 2024-12-09 | End: 2024-12-10

## 2024-12-09 RX ORDER — METOCLOPRAMIDE HYDROCHLORIDE 5 MG/ML
5 INJECTION INTRAMUSCULAR; INTRAVENOUS EVERY 6 HOURS
Status: DISCONTINUED | OUTPATIENT
Start: 2024-12-09 | End: 2024-12-11

## 2024-12-09 RX ORDER — VANCOMYCIN 1.75 GRAM/500 ML IN 0.9 % SODIUM CHLORIDE INTRAVENOUS
12.5 EVERY 24 HOURS
Status: DISCONTINUED | OUTPATIENT
Start: 2024-12-09 | End: 2024-12-15

## 2024-12-09 RX ORDER — POLYETHYLENE GLYCOL 3350 17 G/17G
17 POWDER, FOR SOLUTION ORAL 2 TIMES DAILY
Status: DISCONTINUED | OUTPATIENT
Start: 2024-12-09 | End: 2024-12-11

## 2024-12-09 RX ORDER — HYDRALAZINE HYDROCHLORIDE 20 MG/ML
10 INJECTION INTRAMUSCULAR; INTRAVENOUS EVERY 4 HOURS PRN
Status: DISCONTINUED | OUTPATIENT
Start: 2024-12-09 | End: 2024-12-20

## 2024-12-09 RX ORDER — SODIUM CHLORIDE FOR INHALATION 3 %
3 VIAL, NEBULIZER (ML) INHALATION AS NEEDED
Status: DISCONTINUED | OUTPATIENT
Start: 2024-12-09 | End: 2024-12-23

## 2024-12-09 NOTE — PROGRESS NOTES
[facility]  part of [facility]    Progress Note      Subjective:     Patient currently ventilated-has tracheostomy tube in place.  BP better controlled while on sedation.  Blood pressure elevated when patient off sedation and anxious and agitated    Webb in place with almost a liter of urine output post IV furosemide.  Urine appear with cloudy    Review of Systems:     Unable to obtain    Objective:   Temp:  [97.7 °F (36.5 °C)-99.4 °F (37.4 °C)] 97.7 °F (36.5 °C)  Pulse:  [67-85] 82  Resp:  [13-30] 25  BP: (110-150)/(53-67) 148/67  SpO2:  [100 %] 100 %  FiO2 (%):  [30 %] 30 %  SpO2: 100 %     Intake/Output Summary (Last 24 hours) at 12/9/2024 1537  Last data filed at 12/9/2024 1441  Gross per 24 hour   Intake 2554.58 ml   Output 2675 ml   Net -120.42 ml     Wt Readings from Last 3 Encounters:   12/09/24 297 lb 9.9 oz (135 kg)   10/24/24 254 lb (115.2 kg)   05/17/24 249 lb (112.9 kg)       General appearance: Intubated -sleepy  Head: Normocephalic, atraumatic  Neck:  no JVD, supple, symmetrical  Skin: No rashes or lesions  Neurologic: unable to examine       Medications:  Current Facility-Administered Medications   Medication Dose Route Frequency    metoclopramide (Reglan) 5 mg/mL injection 5 mg  5 mg Intravenous Q6H    furosemide (Lasix) 500 mg in sodium chloride 0.9% 125 mL infusion  10 mg/hr Intravenous Continuous    polyethylene glycol (PEG 3350) (Miralax) 17 g oral packet 17 g  17 g Oral BID    cloNIDine (Catapres) tab 0.3 mg  0.3 mg Oral TID    sodium chloride 3 % nebulizer solution 3 mL  3 mL Nebulization PRN    albuterol (Ventolin) (2.5 MG/3ML) 0.083% nebulizer solution 2.5 mg  2.5 mg Nebulization Q6H PRN    LORazepam (Ativan) tab 1 mg  1 mg Per G Tube TID    carvedilol (Coreg) tab 37.5 mg  37.5 mg Oral BID with meals    acetaminophen (Tylenol) 160 MG/5ML oral liquid 650 mg  650 mg Oral Q6H PRN    aspirin chewable tab 81 mg  81 mg Peg Tube Daily    QUEtiapine (SEROquel) tab 50 mg  50  mg Per G Tube BID    niCARdipine (carDENE) 125 mg in sodium chloride 0.9% 250 mL infusion  5-15 mg/hr Intravenous Continuous    senna (Senokot) 8.8 MG/5ML oral syrup 8.8 mg  5 mL Per G Tube BID    hydrALAzine (Apresoline) 20 mg/mL injection 10 mg  10 mg Intravenous Q6H PRN    docusate (Colace) 50 MG/5ML oral solution 100 mg  100 mg Oral BID    isosorbide dinitrate (Isordil) tab 40 mg  40 mg Per NG Tube TID (Nitrates)    hydrALAZINE (Apresoline) tab 150 mg  150 mg Oral Q8H GABRIEL    amLODIPine (Norvasc) tab 10 mg  10 mg Oral Daily    ipratropium-albuterol (Duoneb) 0.5-2.5 (3) MG/3ML inhalation solution 3 mL  3 mL Nebulization Q6H PRN    HYDROcodone-acetaminophen (Norco) 5-325 MG per tab 2 tablet  2 tablet Oral Q4H PRN    heparin (Porcine) 5000 UNIT/ML injection 5,000 Units  5,000 Units Subcutaneous Q8H GABRIEL    melatonin tab 3 mg  3 mg Oral Nightly PRN    bisacodyl (Dulcolax) 10 MG rectal suppository 10 mg  10 mg Rectal Daily PRN    dexmedeTOMIDine in sodium chloride 0.9% (Precedex) 400 mcg/100mL infusion premix  0.2-1.5 mcg/kg/hr (Dosing Weight) Intravenous Continuous    potassium chloride 20 mEq/100mL IVPB premix 20 mEq  20 mEq Intravenous PRN    Or    potassium chloride 40 mEq/100mL IVPB premix (central line) 40 mEq  40 mEq Intravenous PRN    magnesium sulfate in dextrose 5% 1 g/100mL infusion premix 1 g  1 g Intravenous PRN    magnesium sulfate in sterile water for injection 2 g/50mL IVPB premix 2 g  2 g Intravenous PRN    chlorhexidine gluconate (Peridex) 0.12 % oral solution 15 mL  15 mL Mouth/Throat BID    propofol (Diprivan) 10 mg/mL infusion premix  5-50 mcg/kg/min (Dosing Weight) Intravenous Continuous    famotidine (Pepcid) 20 mg/2mL injection 20 mg  20 mg Intravenous Daily          Results:     Recent Labs   Lab 12/07/24  0627 12/08/24  0637 12/09/24  0609   RBC 3.00* 3.84 3.18*   HGB 8.4* 10.9* 9.0*   HCT 26.2* 33.5* 27.7*   MCV 87.3 87.2 87.1   NEPRELIM 5.57 6.94 7.06   WBC 8.5 9.9 10.2   .0 270.0  165.0     Recent Labs   Lab 12/03/24  0312 12/04/24  0510 12/05/24  0416 12/06/24  0534 12/07/24  0627 12/08/24  0637 12/09/24  0609 12/09/24  0720   *  130*   < > 120*   < > 113* 158* 132*  --    BUN 33*  33*   < > 41*   < > 36* 38*  --  37*   CREATSERUM 1.60*  1.60*   < > 1.61*   < > 1.41* 1.48* 1.51*  --    CA 8.9  8.9   < > 9.1   < > 8.8 8.4* 9.1  --    ALB 3.4   < > 3.6   < > 3.3 3.8 3.7  --      140   < > 142   < > 145 141 143  --    K 4.5  4.5   < > 4.0   < > 3.8 5.1  5.1  --  3.9   *  113*   < > 114*   < > 117* 116* 115*  --    CO2 19.0*  19.0*   < > 17.0*   < > 18.0* 18.0* 17.0*  --    ALKPHO 86  --  86  --   --   --  101  --    AST 25  --  31  --   --   --   --  24   ALT 32  --  33  --   --   --  28 25   BILT 0.3  --  0.4  --   --   --  0.3  --    TP 6.3  --  6.3  --   --   --  6.7  --     < > = values in this interval not displayed.     PTT   Date Value Ref Range Status   12/04/2024 30.7 23.0 - 36.0 seconds Final     INR   Date Value Ref Range Status   12/04/2024 1.17 0.80 - 1.20 Final     Comment:     Therapeutic INR range for patients on warfarin:  2.0-3.0 for most medical conditions and surgical prophylaxis   2.5-3.5 for mechanical heart valves and recurrent thromboembolism    Direct thrombin inhibitors (e.g. argatroban, bivalirudin), factor Xa inhibitors (e.g. apixaban, rivaroxaban), and conditions such as coagulation factor deficiency, vitamin K deficiency, and liver disease will prolong the prothrombin time/INR.    Unfractionated heparin and low molecular weight heparin do not affect the prothrombin time/INR up to a concentration of 1 IU/mL and 1.5 IU/mL, respectively.         No results for input(s): \"BNP\" in the last 168 hours.  Recent Labs   Lab 12/07/24  0627 12/08/24  0637 12/09/24  0609   MG 2.5 2.2 2.5   PHOS 4.1 4.2 4.2        Recent Labs   Lab 12/07/24  0627 12/08/24  0637 12/09/24  0609   PHOS 4.1 4.2 4.2   ALB 3.3 3.8 3.7       XR ABDOMEN (1 VIEW)  (CPT=74018)    Result Date: 12/9/2024  CONCLUSION:   No huang dilatation of the small bowel to suggest small bowel obstruction.  Large cecal stool burden which may indicate constipation.  Mild stool burden throughout the remainder of the colon.      Dictated by (CST): Sudarshan Everett MD on 12/09/2024 at 8:42 AM     Finalized by (CST): Sudarshan Everett MD on 12/09/2024 at 8:44 AM          XR CHEST AP PORTABLE  (CPT=71045)    Result Date: 12/9/2024  CONCLUSION:  1. Cardiomegaly.  Atherosclerotic aneurysmal thoracic aorta 2. Tracheostomy tube overlies tracheal air column. 3.  Bilateral mixed alveolar and interstitial multifocal airspace opacification has progressed.    Dictated by (CST): Woodrow Fischer MD on 12/09/2024 at 8:10 AM     Finalized by (CST): Woodrow Fischer MD on 12/09/2024 at 8:14 AM          XR CHEST AP PORTABLE  (CPT=71045)    Result Date: 12/8/2024  CONCLUSION:   Mildly improved patchy bilateral perihilar opacities.      Dictated by (CST): Karen Ureña MD on 12/08/2024 at 7:40 AM     Finalized by (CST): Karen Ureña MD on 12/08/2024 at 7:41 AM               Assessment and Plan:       1.NAIMA on CKD stage III: Nonoliguric on diuretic  NAIMA secondary to MARY LOU/ATN.  creatinine currently mostly at 1.4 1.5 mg/dL  from diuretics  Was on IV furosemide 40 mg twice daily-changed to furosemide 10 mg/h given volume overload    2.aortic dissection: S/p emergent aortic dissection repair  CT surgery input noted  S/p CT chest abdomen pelvis-multifocal bilateral pulmonary nodular consolidation.  No focal fluid collection at surgery site.  Has required packed red blood cell transfusions for drop in hemoglobin    3.respiratory failure: Improve oxygen requirement   S/p tracheostomy Webb unable to extubate the patient    4 -renal cyst  The renal ultrasound showed a mildly complex cyst in the right kidney.  This will need to be followed up with surveillance ultrasound    5.abdominal distention: S/p PEG 12/5  High  tube feed residuals-s/p KUB bowel obstruction or ileus but large stool burden  S/p bowel regimen  Currently tube feed on hold    6.volume overload/bilateral lower extremity edema:  Per I's and O's patient close to 9 L positive balance.  Weight gain 8-10 lbs  Diuretics changed to Lasix gtt. at 10 mg/h    7.hypertension: Blood pressure well-controlled well patient is calm  Currently on Coreg and clonidine and hydralazine and isosorbide and amlodipine  Renal ultrasound with Doppler unremarkable for any renal artery stenosis    Discussed with nursing and CT surgery team    Robbi Resendiz MD

## 2024-12-09 NOTE — RESPIRATORY THERAPY NOTE
Trach care and suction provided, bilateral bs, no acute events or changes made overnight, RT will continue to monitor.           12/09/24 0321   Vent Information   Interface Invasive   Vent Type AV   Vent plugged into main power? Yes   Vent Mode PRVC/AC   Settings   FiO2 (%) 30 %   Resp Rate (Set) 18   Vt (Set, mL) 550 mL   Waveform Decelerating ramp   PEEP/CPAP (cm H2O) 5 cm H20   Insp Time (sec) 0.85 sec   Trigger Sensitivity Flow (L/min) 1 L/min   Humidification Heater   H2O Bag Level Filled   Heater Temperature 98.6 °F (37 °C)   Readings   Total RR 18   Minute Ventilation (L/min) 10.9 L/min   Expiratory Tidal Volume 580 mL   PIP Observed (cm H2O) 36 cm H2O   MAP (cm H2O) 13   I/E Ratio 1:2.9   Plateau Pressure (cm H2O) 32 cm H2O   Static Compliance (L/cm H2O) 22   Dynamic Compliance (L/cm H2O) 18 L/cm H2O

## 2024-12-09 NOTE — PROGRESS NOTES
Pulmonary/ICU/Critical Care Progress Note        Reason for Consultation: post op care  Referring Physician: Dr. Gregg    Seen this am.     SUBJECTIVE:  Afebrile  Vomited this am. Not tolerating TF. Had high residuals  On lots of BP meds      ALLERGIES:  Allergies[1]        MEDS:  Home Medications:  Medications Taking[2]    Scheduled Medication:   LORazepam  1 mg Per G Tube TID    carvedilol  37.5 mg Oral BID with meals    furosemide  40 mg Intravenous BID (Diuretic)    aspirin  81 mg Peg Tube Daily    QUEtiapine  50 mg Per G Tube BID    senna  5 mL Per G Tube BID    cloNIDine  0.3 mg Oral BID    docusate  100 mg Oral BID    isosorbide dinitrate  40 mg Per NG Tube TID (Nitrates)    hydrALAZINE  150 mg Oral Q8H GABRIEL    amLODIPine  10 mg Oral Daily    sodium chloride  3 mL Nebulization 4 times per day    albuterol  2.5 mg Nebulization 4 times per day    heparin  5,000 Units Subcutaneous Q8H GABRIEL    chlorhexidine gluconate  15 mL Mouth/Throat BID    famotidine  20 mg Intravenous Daily     Continuous Infusing Medication:   niCARdipine 15 mg/hr (12/09/24 0806)    dexmedetomidine 1.2 mcg/kg/hr (12/09/24 0827)    propofol 20 mcg/kg/min (12/09/24 0806)     PRN Medications:    acetaminophen    hydrALAzine    ipratropium-albuterol    HYDROcodone-acetaminophen    melatonin    bisacodyl    potassium chloride **OR** potassium chloride    magnesium sulfate in dextrose 5%    magnesium sulfate in sterile water for injection       PHYSICAL EXAM:  /62 (BP Location: Left arm)   Pulse 75   Temp 98.3 °F (36.8 °C) (Axillary)   Resp 23   Ht 5' 5\" (1.651 m)   Wt 297 lb 9.9 oz (135 kg)   SpO2 100%   BMI 49.53 kg/m²   Vent Mode: PRVC/AC  FiO2 (%):  [30 %] 30 %  S RR:  [18] 18  S VT:  [550 mL] 550 mL  PEEP/CPAP (cm H2O):  [5 cm H20] 5 cm H20  MAP (cm H2O):  [12-15] 15  CONSTITUTIONAL: intubated, sedated but opens eyes  HEENT: atraumatic normocephalic  MOUTH: MMM, large tongue  NECK/THROAT: no JVD. Trachea midline. No obvious  thyromegaly. + trach with min bleeding   LUNG: vented upper clear BS b/l no wheezing, + crackles. Diminished at bases. Chest symmetric with respiratory motion. + midsternal surgical wound healing   HEART: regular rate and rhythm, no obvious murmers or gallops noted  ABD: soft distended and tender. + hypoactive bowel sounds. No organomegaly noted. + PEG tube   EXT: no clubbing, cyanosis, or edema noted. Pulses intact grossly  NEURO/MUSCULOSKELETAL: intubated sedated but opens eyes   SKIN: warm, dry. No obvious lesions noted  LYMPH: no obvious LAD      IMAGES:   CXR 12/9/24  CONCLUSION:   1. Cardiomegaly.  Atherosclerotic aneurysmal thoracic aorta   2. Tracheostomy tube overlies tracheal air column.   3.  Bilateral mixed alveolar and interstitial multifocal airspace opacification has progressed.        KUB 12/9/24  CONCLUSION:   No huang dilatation of the small bowel to suggest small bowel obstruction.   Large cecal stool burden which may indicate constipation.  Mild stool burden throughout the remainder of the colon.     CXR 12/5/24  No significant change when compared to 12/04/2024.     CXR 12/4/24  CONCLUSION:   1. Mild cardiomegaly and minimally prominent pulmonary vascularity but no huang pulmonary edema.  ET tube and NG tubes have been removed.  Tracheostomy is now noted in the trachea extending to the level the clavicles.  No pneumothorax.   2. Persistent patchy bilateral upper lobe airspace disease right more than left suggesting persistent bilateral upper lobe pneumonia.  Correlate clinically and continued follow-up is advised.     CXR 12/2/24  FINDINGS:   POSITION: The patient is semi-erect and slightly rotated to the right.   DEVICES: There is an endotracheal tube terminating approximately 3.9 cm above the jennyfer.  A large-bore right internal jugular central venous vascular access sheath has tip projecting in the SVC. An enteric tube extends caudally beyond the field of view.   CARDIAC/VASC: The  cardiomediastinal silhouette is accentuated by AP technique, but likely stably enlarged. There is mild tortuosity of the thoracic aorta with peripheral atherosclerotic vascular calcification. The pulmonary vascularity is within normal   limits.   MEDIAST/HAMLET: Surgical clips are present.   LUNGS/PLEURA: Elevation right hemidiaphragm is noted. Multifocal patchy airspace consolidation is demonstrated, slightly worse on the right side than left. Mild interstitial opacities are seen. Additional scattered reticular opacities may be atelectatic   in nature. No large pleural effusion or pneumothorax is evident.    BONES: Median sternotomy wires are demonstrated. Mild degenerative changes of the thoracic spine are apparent. There is no fracture or visible bony lesion.   OTHER: There may be surgical clips at the level of the thoracic inlet. Leads overlie the chest and obscure underlying detail     CXR 11/27/24  Moderate pulmonary edema, progressed since 11/26/2024.     CT c/a/p  CONCLUSION:  Low-lying ETT, 0.8 cm above the jennyfer   Multifocal bilateral pulmonary nodular consolidation s    CXR 11/24/24  FINDINGS:   CARDIAC/VASC: The cardiac silhouette is exaggerated by AP portable technique. There is stable central pulmonary venous congestion.   MEDIAST/HAMLET:   No visible mass or adenopathy.   LUNGS/PLEURA: There are stable streaky opacities at the right lung base.  No pleural effusion or pneumothorax.   BONES: Sternotomy changes are again noted.   OTHER: An endotracheal tube tip projects 3.8 cm above the jennyfer.  An enteric tube again courses into the left upper quadrant.  A right internal jugular approach [place]-Dev catheter tip again projects over the pulmonary outflow tract.  A mediastinal drain tip again projects over the right suprahilar region.     CXR 11/23/24  On my read possible RML infiltrates  CONCLUSION: Post emergent acute type A aortic dissection repair.  Stable cardiomegaly.  Gross stable/satisfactory position  of lines and tubes.  Mild pulmonary vascular congestion.       CXR 11/22/24  CONCLUSION: Post feeding tube placement with tip in the distal esophagus approximately 4 cm above the gastroesophageal junction.  Recommend advancement of the tube by approximately 10 cm to place it in the stomach.     CXR 11/22/24  CARDIAC/MEDIASTINUM: The cardiac silhouette is enlarged and unchanged.. There is a right internal jugular [place]-Dev catheter with tip at the level of the main pulmonary artery. There is a right-sided chest tube. Endotracheal tube with tip approximately    5.3 cm above the jennyfer. There is a nasogastric tube with tip and sidehole within the stomach and below the diaphragm. There are median sternotomy wires and postoperative changes of ascending aortic repair.   LUNGS: There is pulmonary vascular redistribution without edema. Minimal blunting of the bilateral costophrenic angles may be secondary to trace pleural effusions versus chronic pleural thickening. There is mild bibasilar atelectasis. No pneumothorax.   BONES: There is degenerative disease of the thoracic spine.     Chest CTA 11/22/24  CONCLUSION:   1. Type a aortic dissection beginning just above right renal (correction:  Right coronary)  artery and extending distally into the left common iliac artery and external iliac.  Findings were called to Dr. Echavarria at 5:52 p.m.       LABS:  Recent Labs   Lab 12/07/24  0627 12/08/24  0637 12/09/24  0609   RBC 3.00* 3.84 3.18*   HGB 8.4* 10.9* 9.0*   HCT 26.2* 33.5* 27.7*   MCV 87.3 87.2 87.1   MCH 28.0 28.4 28.3   MCHC 32.1 32.5 32.5   RDW 17.2* 17.9* 17.3*   NEPRELIM 5.57 6.94 7.06   WBC 8.5 9.9 10.2   .0 270.0 165.0       Recent Labs   Lab 12/03/24  0312 12/04/24  0510 12/05/24  0416 12/06/24  0534 12/07/24  0627 12/08/24  0637 12/09/24  0609 12/09/24  0720   *  130*   < > 120*   < > 113* 158* 132*  --    BUN 33*  33*   < > 41*   < > 36* 38*  --  37*   CREATSERUM 1.60*  1.60*   < > 1.61*   < >  1.41* 1.48* 1.51*  --    EGFRCR 36*  36*   < > 36*   < > 42* 40* 39*  --    CA 8.9  8.9   < > 9.1   < > 8.8 8.4* 9.1  --    ALB 3.4   < > 3.6   < > 3.3 3.8 3.7  --      140   < > 142   < > 145 141 143  --    K 4.5  4.5   < > 4.0   < > 3.8 5.1  5.1  --  3.9   *  113*   < > 114*   < > 117* 116* 115*  --    CO2 19.0*  19.0*   < > 17.0*   < > 18.0* 18.0* 17.0*  --    ALKPHO 86  --  86  --   --   --  101  --    AST 25  --  31  --   --   --   --  24   ALT 32  --  33  --   --   --  28 25   BILT 0.3  --  0.4  --   --   --  0.3  --    TP 6.3  --  6.3  --   --   --  6.7  --     < > = values in this interval not displayed.       ASSESSMENT/PLAN:  Type A aortic dissection s/p repair now intubated in ICU  -on maxed nicardipine gtt as per CV surgery  -on multiple oral antiHTN meds for BP control  -s/p pRBC transfusion on 12/4 with repeat hg stable    Post op acute respiratory failure  -complicated by extubation and reimergent intubation and significant emesis concerning for aspiration PNA vs pneumonitis  -started on zosyn-completed 10 day course  -checked ETT asp-candida likely contaminant  -failed multiple SBTs d/t increased RR, work of breathing, hypertension, hypoxemia, significant thick secretions  -hx of likely TANYA and previous smoking hx. Has sign dense consolidations and atelectasis on chest CT.  -s/p trach by ENT on 12/4/24 and PEG by GI on 12/5/24  -start trach/vent weaning. Tolerated 5/5 SBT on Friday for 30 min  -hold further SBT today b/c of vomiting and uncontrolled bp  -PRN ABG and CXR  -saline nebs  -on propofol and precedex-having difficulty weaning as pt becomes agitated and hypertensive. Started seroquel 50 mg BID  Continue scheduled clonidine in attempt to wean off precedex    Previous hx of smoking and ETOH  -continue duonebs PRN    NAIMA on CKD and metabolic acidosis  -likely from surgery, and acute issues  -monitor UO and renal labs  -renal following   -agree with trying lasix gtt    Abd  pain  -s/p abd CT as above  -resolved  -not tolerating TF now. KUB with lots of gas  -agree with restarting reglan and monitoring Qtc   -consider GI consult    Proph:  -DVT: hep sq    Dispo:  -full code  -SW/ to assist with LTAC placement  -spoke with CV surgery service this am    Critical care time 34 min independent of procedures      Thank you for the opportunity to care for Alisha WildeShantell Rainey DO, MPH  Pulmonary Critical Care Medicine  [facility]                       [1]   Allergies  Allergen Reactions    Atorvastatin MYALGIA    Pravastatin MYALGIA    Rosuvastatin MYALGIA    Seasonal Runny nose   [2]   Outpatient Medications Marked as Taking for the 11/21/24 encounter (Hospital Encounter)   Medication Sig Dispense Refill    Acetaminophen ER (TYLENOL 8 HOUR) 650 MG Oral Tab CR Take 2 tablets (1,300 mg total) by mouth daily as needed.      Calcium Carb-Cholecalciferol 600-10 MG-MCG Oral Tab Take 1 tablet by mouth daily.      metoprolol succinate ER 50 MG Oral Tablet 24 Hr Take 1 tablet (50 mg total) by mouth daily. 90 tablet 3    Losartan Potassium-HCTZ 100-12.5 MG Oral Tab Take 1 tablet by mouth daily. 90 tablet 3    Potassium Chloride ER 20 MEQ Oral Tab CR Take 1 tablet by mouth daily. 90 tablet 3    albuterol 108 (90 Base) MCG/ACT Inhalation Aero Soln Inhale 2 puffs into the lungs every 4 (four) hours as needed for Wheezing. 3 each 3    amLODIPine 10 MG Oral Tab Take 1 tablet (10 mg total) by mouth daily. 90 tablet 3    Ascorbic Acid (VITAMIN C) 1000 MG Oral Tab Take 1 tablet (1,000 mg total) by mouth daily.      vitamin B-12 50 MCG Oral Tab Take 1 tablet (50 mcg total) by mouth daily.

## 2024-12-09 NOTE — PLAN OF CARE
Patient continues trach/vented; frequently needs to be orally suctioned copious secretions orally and inline pink tinge orally and tan/ yellow via inline. Lung sounds rhonchi throughout,  gets anxious when being suctioned and does not tolerate neb treatments coughs and brief episode of yoon 40's then immediately to baseline 70's NSR. Continues to get pain medicaton prn, will grimace and frequently coughing.   This morning after am care, bp elevated sbp 140- 160's continues on cardene drip now at 15mg/hour on precedex 1.2mcgs and propofol 20mcgs.  I turned off tube feeds, when I suctioned her orally had undigested tube feed in her mouth.   NO changes to her 02 sats 96 to 100% on 30% fio2, RR 16 to 24, oral care done, HOB at 45 degrees.  Portable chest xray to be done this morning, will keep tube feedings off and update attending.  (Since yesterday on day shift was having increase amounts of residual and regurgitation, STACEY Hopkins was updated by day shift nurse Bridget and tube feedings cut in half).     Problem: CARDIOVASCULAR - ADULT  Goal: Maintains optimal cardiac output and hemodynamic stability  Description: INTERVENTIONS:  - Monitor vital signs, rhythm, and trends  - Monitor for bleeding, hypotension and signs of decreased cardiac output  - Evaluate effectiveness of vasoactive medications to optimize hemodynamic stability  - Monitor arterial and/or venous puncture sites for bleeding and/or hematoma  - Assess quality of pulses, skin color and temperature  - Assess for signs of decreased coronary artery perfusion - ex. Angina  - Evaluate fluid balance, assess for edema, trend weights  Outcome: Not Progressing     Problem: RESPIRATORY - ADULT  Goal: Achieves optimal ventilation and oxygenation  Description: INTERVENTIONS:  - Assess for changes in respiratory status  - Assess for changes in mentation and behavior  - Position to facilitate oxygenation and minimize respiratory effort  - Oxygen supplementation based  on oxygen saturation or ABGs  - Provide Smoking Cessation handout, if applicable  - Encourage broncho-pulmonary hygiene including cough, deep breathe, Incentive Spirometry  - Assess the need for suctioning and perform as needed  - Assess and instruct to report SOB or any respiratory difficulty  - Respiratory Therapy support as indicated  - Manage/alleviate anxiety  - Monitor for signs/symptoms of CO2 retention  Outcome: Not Progressing     Problem: GASTROINTESTINAL - ADULT  Goal: Minimal or absence of nausea and vomiting  Description: INTERVENTIONS:  - Maintain adequate hydration with IV or PO as ordered and tolerated  - Nasogastric tube to low intermittent suction as ordered  - Evaluate effectiveness of ordered antiemetic medications  - Provide nonpharmacologic comfort measures as appropriate  - Advance diet as tolerated, if ordered  - Obtain nutritional consult as needed  - Evaluate fluid balance  Outcome: Not Progressing  Goal: Maintains or returns to baseline bowel function  Description: INTERVENTIONS:  - Assess bowel function  - Maintain adequate hydration with IV or PO as ordered and tolerated  - Evaluate effectiveness of GI medications  - Encourage mobilization and activity  - Obtain nutritional consult as needed  - Establish a toileting routine/schedule  - Consider collaborating with pharmacy to review patient's medication profile  Outcome: Not Progressing     Problem: SKIN/TISSUE INTEGRITY - ADULT  Goal: Oral mucous membranes remain intact  Description: INTERVENTIONS  - Assess oral mucosa and hygiene practices  - Implement preventative oral hygiene regimen  - Implement oral medicated treatments as ordered  Outcome: Progressing     Problem: NEUROLOGICAL - ADULT  Goal: Achieves stable or improved neurological status  Description: INTERVENTIONS  - Assess for and report changes in neurological status  - Initiate measures to prevent increased intracranial pressure  - Maintain blood pressure and fluid volume  within ordered parameters to optimize cerebral perfusion and minimize risk of hemorrhage  - Monitor temperature, glucose, and sodium. Initiate appropriate interventions as ordered  Outcome: Not Progressing     Problem: PAIN - ADULT  Goal: Verbalizes/displays adequate comfort level or patient's stated pain goal  Description: INTERVENTIONS:  - Encourage pt to monitor pain and request assistance  - Assess pain using appropriate pain scale  - Administer analgesics based on type and severity of pain and evaluate response  - Implement non-pharmacological measures as appropriate and evaluate response  - Consider cultural and social influences on pain and pain management  - Manage/alleviate anxiety  - Utilize distraction and/or relaxation techniques  - Monitor for opioid side effects  - Notify MD/LIP if interventions unsuccessful or patient reports new pain  - Anticipate increased pain with activity and pre-medicate as appropriate  Outcome: Not Progressing     Problem: Delirium  Goal: Minimize duration of delirium  Description: Interventions:  - Encourage use of hearing aids, eye glasses  - Promote highest level of mobility daily  - Provide frequent reorientation  - Promote wakefulness i.e. lights on, blinds open  - Promote sleep, encourage patient's normal rest cycle i.e. lights off, TV off, minimize noise and interruptions  - Encourage family to assist in orientation and promotion of home routines  Outcome: Not Progressing

## 2024-12-09 NOTE — PROGRESS NOTES
[facility]     Gastroenterology Progress Note    Alisha Wilde Patient Status:  Inpatient    10/25/1965 MRN [id number]   Location [facility] 2W/SW Attending Missy Paulson MD   Hosp Day # 17 PCP Bridger Avendano MD       Subjective:   Pt trach to vent and sedated, unable to answer questions.      Per nursing just received norco and seems much more comfortable.  High residuals this am.  TF on hold.  No BM.  Objective:   Blood pressure 115/56, pulse 73, temperature 97.7 °F (36.5 °C), temperature source Axillary, resp. rate 18, height 5' 5\" (1.651 m), weight 297 lb 9.9 oz (135 kg), SpO2 100%, not currently breastfeeding. Body mass index is 49.53 kg/m².    Gen: sleeping, patient, NAD  HEENT: mucus membranes appear moist, trach to vent  CV: RRR  Lung: no conversational dyspnea   Abdomen: soft NT, distended abdomen with hypoactive BS appreciated, tympany to percussion, dressing CDI over PEG  Skin: dry, warm, no jaundice  Ext: no LE edema is evident  Neuro: Sleeping, unable to assess orientation and interactive  Psych: calm, cooperative    Assessment and Plan:   Alisha Wilde is a 59 year old female w/ PMHx of hypertension, GERD, CKD, hyperlipidemia who presented to ER 2024 for chest pain. Underwent emergent repair of aortic type A dissection on 2024. Inability to wean from the ventillator now s/p trach and PEG placement -.  GI reconsulted for high residual TF.     #High TF residuals  #Constipation  -s/p PEG   -High TF residuals noted by nursing this am, no stools  -KUB without small bowel dilation but large stool burden   -Will start miralax BID and qshift tap water enemas.  Pt   Fine to continue medications.  Would hold TF until substantial stool passed.     Recommend:  -Hold TF, ok for meds  -Miralax BID  -Tap water enemas qshift  -Consider serial imaging pending clinical course    Case discussed with Toma Barrientos MD and Christy AGEE.    Cate Goncalves DNP, FNP-[facility]  Clinic Gastroenterology  12/9/2024      Results:     Lab Results   Component Value Date    WBC 10.2 12/09/2024    HGB 9.0 (L) 12/09/2024    HCT 27.7 (L) 12/09/2024    .0 12/09/2024    CREATSERUM 1.51 (H) 12/09/2024    BUN 37 (H) 12/09/2024     12/09/2024    K 3.9 12/09/2024     (H) 12/09/2024    CO2 17.0 (L) 12/09/2024     (H) 12/09/2024    CA 9.1 12/09/2024    ALB 3.7 12/09/2024    ALKPHO 101 12/09/2024    BILT 0.3 12/09/2024    TP 6.7 12/09/2024    AST 24 12/09/2024    ALT 25 12/09/2024    PTT 30.7 12/04/2024    INR 1.17 12/04/2024    TSH 2.085 12/07/2024    NATHALIE 99 11/25/2024    LIP 28 11/25/2024    DDIMER 0.42 12/08/2019    ESRML 15 06/05/2021    MG 2.5 12/09/2024    PHOS 4.2 12/09/2024    TROP <0.045 12/08/2019     (H) 09/23/2021    B12 1,156 (H) 07/23/2018       XR ABDOMEN (1 VIEW) (CPT=74018)    Result Date: 12/9/2024  CONCLUSION:   No huang dilatation of the small bowel to suggest small bowel obstruction.  Large cecal stool burden which may indicate constipation.  Mild stool burden throughout the remainder of the colon.      Dictated by (CST): Sudarshan Everett MD on 12/09/2024 at 8:42 AM     Finalized by (CST): Sudarshan Everett MD on 12/09/2024 at 8:44 AM          XR CHEST AP PORTABLE  (CPT=71045)    Result Date: 12/9/2024  CONCLUSION:  1. Cardiomegaly.  Atherosclerotic aneurysmal thoracic aorta 2. Tracheostomy tube overlies tracheal air column. 3.  Bilateral mixed alveolar and interstitial multifocal airspace opacification has progressed.    Dictated by (CST): Woodrow Fischer MD on 12/09/2024 at 8:10 AM     Finalized by (CST): Woodrow Fischer MD on 12/09/2024 at 8:14 AM          XR CHEST AP PORTABLE  (CPT=71045)    Result Date: 12/8/2024  CONCLUSION:   Mildly improved patchy bilateral perihilar opacities.      Dictated by (CST): Karen Ureña MD on 12/08/2024 at 7:40 AM     Finalized by (CST): Karen Ureña MD on 12/08/2024 at 7:41 AM

## 2024-12-09 NOTE — PROGRESS NOTES
[facility]  part of [facility]    Cardiology Progress Note    Alisha Wilde Patient Status:  Inpatient    10/25/1965 MRN [id number]   Location [facility] 2W/SW Attending Aguila Villegas MD   Hosp Day # 17 PCP Bridger Avendano MD     Interval History   Status post trach and PEG tube  Currently on Precedex  Remains on nicardipine gtt    Assessment and Plan:   Acute hypoxic respiratory failure, c/b aspiration event requiring re-intubation  Type A aortic dissection  NAIMA on CKD-III, improved  Obesity  EtOH abuse  -presented with acute onset CP   -CT with type A aortic dissection above right coronary artery and extending distally into the left common iliac artery and external iliac   -s/p emergent successful repair on 24  -had aspiration event following self-extubation, re-intubated with multiple failed SBT; now s/p trach/PEG tube    Hypertension  -TTE on  with hyperdynamic LVEF  -continue BP management - substantial component of agitation worsening BP   -coreg from 37.5 mg twice daily   -clonidine patch to 0.3 mg 3 times daily   -combination hydralazine + isosorbide 150mg-40mg TID   -amlodipine 10 mg daily   -wean nicardipine gtt    -ARB held by nephrology due to NAIMA/CKD -> discuss with nephrology on initiation  -clinically at appears significantly overloaded; volume management per nephrology, consider scheduled diuresis  -monitor rhythm on tele    Objective:   Patient Vitals for the past 24 hrs:   BP Temp Temp src Pulse Resp SpO2 Weight   24 1500 119/66 -- -- 70 18 92 % --   24 1400 124/66 -- -- 71 18 95 % --   24 1300 122/66 -- -- 68 18 97 % --   24 1200 124/68 98.4 °F (36.9 °C) Axillary 68 18 97 % --   24 1100 127/75 -- -- 69 18 98 % --   24 1000 129/71 -- -- 69 18 97 % --   24 0900 128/69 -- -- 70 18 97 % --   24 0800 (!) 143/118 97.7 °F (36.5 °C) Temporal 70 18 98 % --   24 0700 130/70 -- -- 69 18 99 % --   24  0621 -- -- -- -- -- -- 282 lb 3 oz (128 kg)   11/26/24 0600 95/53 -- -- 68 21 97 % --   11/26/24 0500 121/67 -- -- 70 18 97 % --   11/26/24 0400 119/68 98.4 °F (36.9 °C) Temporal 72 18 98 % --   11/26/24 0200 109/60 -- -- 72 21 96 % --   11/26/24 0130 -- -- -- 72 18 97 % --   11/26/24 0100 142/73 -- -- 71 18 98 % --   11/26/24 0030 -- -- -- 71 18 99 % --   11/26/24 0000 139/70 98.3 °F (36.8 °C) Temporal 70 18 98 % --   11/25/24 2300 130/69 -- -- 72 18 98 % --   11/25/24 2200 135/70 -- -- 71 18 98 % --   11/25/24 2100 138/71 -- -- 71 18 99 % --   11/25/24 2000 132/68 98.1 °F (36.7 °C) Temporal 69 18 99 % --   11/25/24 1900 124/64 -- -- 71 18 98 % --   11/25/24 1800 136/72 -- -- 71 18 98 % --   11/25/24 1700 138/70 -- -- 70 18 98 % --       Intake/Output:   Last 3 shifts:   Intake/Output                   11/24/24 0700 - 11/25/24 0659 11/25/24 0700 - 11/26/24 0659 11/26/24 0700 - 11/27/24 0659       Intake    P.O.  0  0  --    P.O. 0 0 --    I.V.  2276.7  1884.9  --    I.V. 154 615 --    Volume (mL) Insulin 53.9 37 --    Volume (mL) Nitroglycerin 236.9 54.5 --    Volume (mL) Dobutamine 4.5 -- --    Volume (mL) Propofol 477.8 713.9 --    Volume (mL) Dexmedetomidine 352.2 384.5 --    Volume (mL) (dextrose 5%-sodium chloride 0.45% infusion) 997.4 80 --    NG/GT  50  150  60    Intake (mL) (NG/OG Tube Orogastric 16 Fr. Right mouth) 50 150 60    IV PIGGYBACK  600  500  --    Volume (mL) (piperacillin-tazobactam (Zosyn) 3.375 g in dextrose 5% 100 mL IVPB-ADDV) 300 200 --    Volume (mL) (acetaminophen (Ofirmev) 10 mg/mL infusion premix 1,000 mg) 200 100 --    Volume (mL) (potassium chloride 20 mEq/100mL IVPB premix 20 mEq) -- 100 --    Volume (mL) (potassium chloride 40 mEq/100mL IVPB premix (central line) 40 mEq) 100 100 --    Total Intake 2926.7 2534.9 60       Output    Urine  1800  3570  800    Output (mL) (Urethral Catheter Latex;Temperature probe;Double-lumen) 1800 3570 800    Emesis/NG output  550  365  --    Residual  volume (ml) (NG/OG Tube Orogastric 16 Fr. Right mouth) -- 5 --    Output (mL) (NG/OG Tube Orogastric 16 Fr. Right mouth) 550 360 --    Chest Tube  188  125  30    Output (mL) ([REMOVED] Chest Tube Anterior Mediastinal 32 Fr.) 48 50 --    Output (mL) (Chest Tube Anterior Pericardial 24 Fr.) 140 75 30    Total Output 2538 4060 830       Net I/O     388.7 -1525.1 -770             Vent Settings: Vent Mode: PRVC/AC  FiO2 (%):  [30 %] 30 %  S RR:  [18] 18  S VT:  [550 mL] 550 mL  PEEP/CPAP (cm H2O):  [5 cm H20] 5 cm H20  MAP (cm H2O):  [12-15] 15    Hemodynamic parameters (last 24 hours):      Scheduled Meds:    metoclopramide  5 mg Intravenous Q6H    polyethylene glycol (PEG 3350)  17 g Oral BID    LORazepam  1 mg Per G Tube TID    carvedilol  37.5 mg Oral BID with meals    aspirin  81 mg Peg Tube Daily    QUEtiapine  50 mg Per G Tube BID    senna  5 mL Per G Tube BID    cloNIDine  0.3 mg Oral BID    docusate  100 mg Oral BID    isosorbide dinitrate  40 mg Per NG Tube TID (Nitrates)    hydrALAZINE  150 mg Oral Q8H GABRIEL    amLODIPine  10 mg Oral Daily    sodium chloride  3 mL Nebulization 4 times per day    albuterol  2.5 mg Nebulization 4 times per day    heparin  5,000 Units Subcutaneous Q8H GABRIEL    chlorhexidine gluconate  15 mL Mouth/Throat BID    famotidine  20 mg Intravenous Daily       Continuous Infusions:    furosemide (Lasix) 500 mg in sodium chloride 0.9% 125 mL infusion      niCARdipine 15 mg/hr (12/09/24 0930)    dexmedetomidine 1.2 mcg/kg/hr (12/09/24 0930)    propofol 20 mcg/kg/min (12/09/24 0930)       Results:     Lab Results   Component Value Date    WBC 10.2 12/09/2024    HGB 9.0 (L) 12/09/2024    HCT 27.7 (L) 12/09/2024    .0 12/09/2024    CREATSERUM 1.51 (H) 12/09/2024    BUN 37 (H) 12/09/2024     12/09/2024    K 3.9 12/09/2024     (H) 12/09/2024    CO2 17.0 (L) 12/09/2024     (H) 12/09/2024    CA 9.1 12/09/2024    ALB 3.7 12/09/2024    ALKPHO 101 12/09/2024    BILT 0.3  12/09/2024    TP 6.7 12/09/2024    AST 24 12/09/2024    ALT 25 12/09/2024    PTT 30.7 12/04/2024    INR 1.17 12/04/2024    TSH 2.085 12/07/2024    NATHALIE 99 11/25/2024    LIP 28 11/25/2024    DDIMER 0.42 12/08/2019    ESRML 15 06/05/2021    MG 2.5 12/09/2024    PHOS 4.2 12/09/2024    TROP <0.045 12/08/2019     (H) 09/23/2021    B12 1,156 (H) 07/23/2018       Recent Labs   Lab 12/07/24  0627 12/08/24  0637 12/09/24  0609 12/09/24  0720   * 158* 132*  --    BUN 36* 38*  --  37*   CREATSERUM 1.41* 1.48* 1.51*  --    CA 8.8 8.4* 9.1  --     141 143  --    K 3.8 5.1  5.1  --  3.9   * 116* 115*  --    CO2 18.0* 18.0* 17.0*  --      Recent Labs   Lab 12/07/24  0627 12/08/24  0637 12/09/24  0609   RBC 3.00* 3.84 3.18*   HGB 8.4* 10.9* 9.0*   HCT 26.2* 33.5* 27.7*   MCV 87.3 87.2 87.1   MCH 28.0 28.4 28.3   MCHC 32.1 32.5 32.5   RDW 17.2* 17.9* 17.3*   NEPRELIM 5.57 6.94 7.06   WBC 8.5 9.9 10.2   .0 270.0 165.0       No results for input(s): \"BNPML\" in the last 168 hours.    No results for input(s): \"TROP\", \"CK\" in the last 168 hours.    XR ABDOMEN (1 VIEW) (CPT=74018)    Result Date: 12/9/2024  CONCLUSION:   No huang dilatation of the small bowel to suggest small bowel obstruction.  Large cecal stool burden which may indicate constipation.  Mild stool burden throughout the remainder of the colon.      Dictated by (CST): Sudarshan Everett MD on 12/09/2024 at 8:42 AM     Finalized by (CST): Sudarshan Everett MD on 12/09/2024 at 8:44 AM          XR CHEST AP PORTABLE  (CPT=71045)    Result Date: 12/9/2024  CONCLUSION:  1. Cardiomegaly.  Atherosclerotic aneurysmal thoracic aorta 2. Tracheostomy tube overlies tracheal air column. 3.  Bilateral mixed alveolar and interstitial multifocal airspace opacification has progressed.    Dictated by (CST): Woodrow Fischer MD on 12/09/2024 at 8:10 AM     Finalized by (CST): Woodrow Fischer MD on 12/09/2024 at 8:14 AM          XR CHEST AP PORTABLE   (CPT=71045)    Result Date: 12/8/2024  CONCLUSION:   Mildly improved patchy bilateral perihilar opacities.      Dictated by (CST): Karen Ureña MD on 12/08/2024 at 7:40 AM     Finalized by (CST): Karen Ureña MD on 12/08/2024 at 7:41 AM                    Exam:     Physical Exam:  General: awake/alert  HEENT: +trach  Neck: No JVD, carotids 2+, no bruits.  Cardiac: Regular rate and rhythm. S1, S2 normal.   Lungs: +rhonchi   Abdomen: Soft, non-tender.BS-present.  Extremities: Without clubbing or cyanosis. + BLE edema.    Neurologic: unable to obtain  Skin: Warm and dry.     Edward Montgomery, [facility] [place]

## 2024-12-09 NOTE — PLAN OF CARE
[id number]: Pt vomiting feculent material in the later part of the shift. Suctioned orally & via tracheostomy with RT dozens of times. Pt taken to Stat CT Abdomen & pelvis, see imaging results for details.   Dr. Barrett with General Surgery was updated; no plans currently for immediate intervention are indicated.  Cardio Thoracic team, GI, ENT all updated as well on the recent developments. Messaged Dr. Sanford with ENT & goals include tracheostomy exchange in the near future given the significant amount of fecal emesis noted in the Pt's airway.  Aggressive oral care given several times including cleaning the Pt's nares as well.  After, multidisciplinary conversations, the decision was made to maintain NPO status, increase IV sedation to minimize continued agitation leading to further emesis. As Pt remains NPO, both propofol & precedex drips were increased with good results.  Pt given full CHG bath at shift with full linen change.   RN-RN report and care endorsed accordingly.    Problem: Patient Centered Care  Goal: Patient preferences are identified and integrated in the patient's plan of care  Description: Interventions:  - What would you like us to know as we care for you? Presently unable to assess as Pt remains [id number];  - Provide timely, complete, and accurate information to patient/family  - Incorporate patient and family knowledge, values, beliefs, and cultural backgrounds into the planning and delivery of care  - Encourage patient/family to participate in care and decision-making at the level they choose  - Honor patient and family perspectives and choices  Outcome: Progressing    Problem: CARDIOVASCULAR - ADULT  Goal: Maintains optimal cardiac output and hemodynamic stability  Description: INTERVENTIONS:  - Monitor vital signs, rhythm, and trends  - Monitor for bleeding, hypotension and signs of decreased cardiac output  - Evaluate effectiveness of vasoactive medications to optimize hemodynamic  stability  - Monitor arterial and/or venous puncture sites for bleeding and/or hematoma  - Assess quality of pulses, skin color and temperature  - Assess for signs of decreased coronary artery perfusion - ex. Angina  - Evaluate fluid balance, assess for edema, trend weights  Outcome: Progressing  Goal: Absence of cardiac arrhythmias or at baseline  Description: INTERVENTIONS:  - Continuous cardiac monitoring, monitor vital signs, obtain 12 lead EKG if indicated  - Evaluate effectiveness of antiarrhythmic and heart rate control medications as ordered  - Initiate emergency measures for life threatening arrhythmias  - Monitor electrolytes and administer replacement therapy as ordered  Outcome: Progressing     Problem: RESPIRATORY - ADULT  Goal: Achieves optimal ventilation and oxygenation  Description: INTERVENTIONS:  - Assess for changes in respiratory status  - Assess for changes in mentation and behavior  - Position to facilitate oxygenation and minimize respiratory effort  - Oxygen supplementation based on oxygen saturation or ABGs  - Provide Smoking Cessation handout, if applicable  - Encourage broncho-pulmonary hygiene including cough, deep breathe, Incentive Spirometry  - Assess the need for suctioning and perform as needed  - Assess and instruct to report SOB or any respiratory difficulty  - Respiratory Therapy support as indicated  - Manage/alleviate anxiety  - Monitor for signs/symptoms of CO2 retention  Outcome: Progressing     Problem: GASTROINTESTINAL - ADULT  Goal: Minimal or absence of nausea and vomiting  Description: INTERVENTIONS:  - Maintain adequate hydration with IV or PO as ordered and tolerated  - Nasogastric tube to low intermittent suction as ordered  - Evaluate effectiveness of ordered antiemetic medications  - Provide nonpharmacologic comfort measures as appropriate  - Advance diet as tolerated, if ordered  - Obtain nutritional consult as needed  - Evaluate fluid balance  Outcome:  Progressing  Goal: Maintains or returns to baseline bowel function  Description: INTERVENTIONS:  - Assess bowel function  - Maintain adequate hydration with IV or PO as ordered and tolerated  - Evaluate effectiveness of GI medications  - Encourage mobilization and activity  - Obtain nutritional consult as needed  - Establish a toileting routine/schedule  - Consider collaborating with pharmacy to review patient's medication profile  Outcome: Progressing     Problem: METABOLIC/FLUID AND ELECTROLYTES - ADULT  Goal: Glucose maintained within prescribed range  Description: INTERVENTIONS:  - Monitor Blood Glucose as ordered  - Assess for signs and symptoms of hyperglycemia and hypoglycemia  - Administer ordered medications to maintain glucose within target range  - Assess barriers to adequate nutritional intake and initiate nutrition consult as needed  - Instruct patient on self management of diabetes  Outcome: Progressing  Goal: Electrolytes maintained within normal limits  Description: INTERVENTIONS:  - Monitor labs and rhythm and assess patient for signs and symptoms of electrolyte imbalances  - Administer electrolyte replacement as ordered  - Monitor response to electrolyte replacements, including rhythm and repeat lab results as appropriate  - Fluid restriction as ordered  - Instruct patient on fluid and nutrition restrictions as appropriate  Outcome: Progressing  Goal: Hemodynamic stability and optimal renal function maintained  Description: INTERVENTIONS:  - Monitor labs and assess for signs and symptoms of volume excess or deficit  - Monitor intake, output and patient weight  - Monitor urine specific gravity, serum osmolarity and serum sodium as indicated or ordered  - Monitor response to interventions for patient's volume status, including labs, urine output, blood pressure (other measures as available)  - Encourage oral intake as appropriate  - Instruct patient on fluid and nutrition restrictions as  appropriate  Outcome: Progressing     Problem: SKIN/TISSUE INTEGRITY - ADULT  Goal: Skin integrity remains intact  Description: INTERVENTIONS  - Assess and document risk factors for pressure ulcer development  - Assess and document skin integrity  - Monitor for areas of redness and/or skin breakdown  - Initiate interventions, skin care algorithm/standards of care as needed  Outcome: Progressing  Goal: Incision(s), wounds(s) or drain site(s) healing without S/S of infection  Description: INTERVENTIONS:  - Assess and document risk factors for pressure ulcer development  - Assess and document skin integrity  - Assess and document dressing/incision, wound bed, drain sites and surrounding tissue  - Implement wound care per orders  - Initiate isolation precautions as appropriate  - Initiate Pressure Ulcer prevention bundle as indicated  Outcome: Progressing  Goal: Oral mucous membranes remain intact  Description: INTERVENTIONS  - Assess oral mucosa and hygiene practices  - Implement preventative oral hygiene regimen  - Implement oral medicated treatments as ordered  Outcome: Progressing     Problem: HEMATOLOGIC - ADULT  Goal: Maintains hematologic stability  Description: INTERVENTIONS  - Assess for signs and symptoms of bleeding or hemorrhage  - Monitor labs and vital signs for trends  - Administer supportive blood products/factors, fluids and medications as ordered and appropriate  - Administer supportive blood products/factors as ordered and appropriate  Outcome: Progressing     Problem: MUSCULOSKELETAL - ADULT  Goal: Return mobility to safest level of function  Description: INTERVENTIONS:  - Assess patient stability and activity tolerance for standing, transferring and ambulating w/ or w/o assistive devices  - Assist with transfers and ambulation using safe patient handling equipment as needed  - Ensure adequate protection for wounds/incisions during mobilization  - Obtain PT/OT consults as needed  - Advance activity  as appropriate  - Communicate ordered activity level and limitations with patient/family  Outcome: Progressing  Goal: Maintain proper alignment of affected body part  Description: INTERVENTIONS:  - Support and protect limb and body alignment per provider's orders  - Instruct and reinforce with patient and family use of appropriate assistive device and precautions (e.g. spinal or hip dislocation precautions)  Outcome: Progressing     Problem: NEUROLOGICAL - ADULT  Goal: Achieves stable or improved neurological status  Description: INTERVENTIONS  - Assess for and report changes in neurological status  - Initiate measures to prevent increased intracranial pressure  - Maintain blood pressure and fluid volume within ordered parameters to optimize cerebral perfusion and minimize risk of hemorrhage  - Monitor temperature, glucose, and sodium. Initiate appropriate interventions as ordered  Outcome: Progressing     Problem: PAIN - ADULT  Goal: Verbalizes/displays adequate comfort level or patient's stated pain goal  Description: INTERVENTIONS:  - Encourage pt to monitor pain and request assistance  - Assess pain using appropriate pain scale  - Administer analgesics based on type and severity of pain and evaluate response  - Implement non-pharmacological measures as appropriate and evaluate response  - Consider cultural and social influences on pain and pain management  - Manage/alleviate anxiety  - Utilize distraction and/or relaxation techniques  - Monitor for opioid side effects  - Notify MD/LIP if interventions unsuccessful or patient reports new pain  - Anticipate increased pain with activity and pre-medicate as appropriate  Outcome: Progressing     Problem: Delirium  Goal: Minimize duration of delirium  Description: Interventions:  - Encourage use of hearing aids, eye glasses  - Promote highest level of mobility daily  - Provide frequent reorientation  - Promote wakefulness i.e. lights on, blinds open  - Promote sleep,  encourage patient's normal rest cycle i.e. lights off, TV off, minimize noise and interruptions  - Encourage family to assist in orientation and promotion of home routines  Outcome: Progressing    Christy Pierce, RN, BSN, CCRN

## 2024-12-09 NOTE — PROGRESS NOTES
[facility]  part of [facility]    Progress Note    Alisha Wilde Patient Status:  Inpatient    10/25/1965 MRN [id number]   Location [facility] 2W/SW Attending Missy Paulson MD   Hosp Day # 17 PCP Bridger Avendano MD     Subjective:  In bed on exam, partially sedated with trach on vent support.  Had an episode of emesis this a.m., brief bradycardia, likely vasovagal episode-TF on hold.  KUB just completed.  Abd tender to palpation - diffuse, Tachypneic/HTN-remains on IV sedation, IV antihypertensives this a.m.  No visitors at bedside currently.    Objective:  /56 (BP Location: Left arm)   Pulse 73   Temp 97.7 °F (36.5 °C) (Axillary)   Resp 18   Ht 5' 5\" (1.651 m)   Wt 297 lb 9.9 oz (135 kg)   SpO2 100%   BMI 49.53 kg/m²     Temp (24hrs), Av.6 °F (37 °C), Min:97.7 °F (36.5 °C), Max:99.4 °F (37.4 °C)      Intake/Output:    Intake/Output Summary (Last 24 hours) at 2024 1227  Last data filed at 2024 1200  Gross per 24 hour   Intake 3173.47 ml   Output 3070 ml   Net 103.47 ml       Wt Readings from Last 3 Encounters:   24 297 lb 9.9 oz (135 kg)   10/24/24 254 lb (115.2 kg)   24 249 lb (112.9 kg)       Allergies:  Allergies[1]    Labs:  Lab Results   Component Value Date    WBC 10.2 2024    HGB 9.0 2024    HCT 27.7 2024    .0 2024    CREATSERUM 1.51 2024    BUN 37 2024     2024    K 3.9 2024     2024    CO2 17.0 2024     2024    CA 9.1 2024    ALB 3.7 2024    ALKPHO 101 2024    BILT 0.3 2024    TP 6.7 2024    AST 24 2024    ALT 25 2024    MG 2.5 2024    PHOS 4.2 2024       Physical Exam:  Blood pressure 115/56, pulse 73, temperature 97.7 °F (36.5 °C), temperature source Axillary, resp. rate 18, height 5' 5\" (1.651 m), weight 297 lb 9.9 oz (135 kg), SpO2 100%, not currently breastfeeding.  Neck: Trach  Lungs:  Coarse bilateral anterior/Lat  Heart: RRR, S1, S2  Abdomen: Obese, NT/mild distention/tympanic, BS+x 4/last BM 12/8  Extremities: Warm, dry, 2+ generalized edema  Pulses: 2+ bilat DP  Skin: sternotomy incision CATHIE=CDI-healing well  Neurological:  sedated- opens eyes to verbal stimuli, per RN able to follow simple commands    Assessment/Plan:  S/P EMERGENT AORTIC DISSECTION REPAIR POD # 17  Respiratory Failure w/multiple failed SBT prior to trach placement -currently on full vent support-continue daily weaning trials. Re-intubated on 11/22 per Pulm after emesis episode/ETT removal due to emesis content noted in airway.    S/P Trach placement per ENT on 12/4  Completed ABX course (Zosyn 10day) for suspected aspiration pneumonia.   CT Chest/Abdomen/Pelvis (non-contrast) 11/26=no obstruction noted/stable. +BM (s) post op  Emesis this am, KUB ordered -hold TF pend results. Reglan stopped 2/2 seroquel interaction. On Colace/Senna - last BM 12/8, Resume reglan - monitor QT/for EPS while on Seroquel. Have GI reeval  Hx: HTN: on multiple antihypertensives including IV Cardene-wean as able: SBP goal= <130/MAP >70. Mgt per Cards. New A-line placed 12/2  New PICC placed 12/2  Pain meds as needed -wean off IV as tolerated  Scds/Heparin SubQ prophylaxis DVT prevention. HIPA 11/25=negative. Plts continue >100,000  Continue ICU status   Hx: CKD. Expected post op volume overload w/mgt per Nephrology (consulted on 11/23) for NAIMA. Limit/avoid nephrotoxic meds: Grossly volume overloaded on exam, start Lasix gtt 10mg/hr.  Webb remains for close I/O monitoring & immobility, Webb changed 12/9. Kidney US w/doppler over weekend, renal cyst unchanged  Expected post op anemia: w/o clinical significance/stable. May be slightly diluted due to volume overload.   Hx: Morbid obesity w/BMI >40-plan for RD to see when appropriate  Nutrition per TF via PEG-currently on hold with emesis this am. PEG placed 12/5 per GI.    Hx: ETOH abuse-monitor for  withdrawals. CIWA protocol PRN   Pt on Ativan TID/Seroquel BID/has been receiving Norco PRN. D/c fentanyl/MSo4    Discharge planning: pt lives alone and has supportive family.   Anticipate LTAC at discharge: referrals sent proactively by  on 11/29 for LTAC. Tentatively ready once  hemodynamically stable off IV sedation/antihypertensives.    Plan of care discussed w/RN and rounding CV Surgeon - Dr. Marysol Randle APRN  12/9/2024  0854      Addendum 1730 - Pt w/feculent emesis, abd distended, tender, tympanic.  PEG to suction - no aspirate, stat abd CT.  Gen Surgery Dr. Barrett consulted.  NPo, Lasix gtt stopped.  Empiric Vanc/Zosyn with feculent material in trach       [1]   Allergies  Allergen Reactions    Atorvastatin MYALGIA    Pravastatin MYALGIA    Rosuvastatin MYALGIA    Seasonal Runny nose

## 2024-12-09 NOTE — PROGRESS NOTES
[facility]  part of [facility]    Progress Note    Alisha Wilde Patient Status:  Inpatient    10/25/1965 MRN [id number]   Location [facility] 2W/SW Attending Hayde Brito MD   Hosp Day # 17 PCP Bridger Avendano MD     Chief Complaint:   Chief Complaint   Patient presents with    Chest Pain Angina       Subjective:   Alisha Wilde has trach collar in place. On the vent but arousable. Still having a lot of secretions on the vent.     Objective:   Objective:    Blood pressure 137/62, pulse 73, temperature 97.7 °F (36.5 °C), temperature source Axillary, resp. rate 18, height 5' 5\" (1.651 m), weight 297 lb 9.9 oz (135 kg), SpO2 100%, not currently breastfeeding.    Physical Exam:    General: No acute distress. Intubated.   Respiratory: Diminished bases   Cardiovascular: S1, S2. Regular rate and rhythm. No murmurs, rubs or gallops.   Abdomen: Soft, nontender, distended.  Positive bowel sounds. No rebound or guarding.  Extremities: 2+ pitting edema x 4 ext    Results:   Results:    Labs:  Recent Labs   Lab 24  03124  0510 24  1728 24  0416 24  0534 24  0627 24  0637 24  0609   WBC 11.5* 11.9*   < > 12.2* 9.5 8.5 9.9 10.2   HGB 8.0* 7.7*   < > 9.0* 8.5* 8.4* 10.9* 9.0*   MCV 83.8 83.7   < > 83.0 83.8 87.3 87.2 87.1   .0 238.0   < > 224.0 264.0 194.0 270.0 165.0   BAND 3 4  --   --   --   --   --   --    INR  --  1.17  --   --   --   --   --   --     < > = values in this interval not displayed.       Recent Labs   Lab 24  0312 24  0510 24  0416 24  0534 24  0627 24  0637 24  0609 24  0720   *  130*   < > 120*   < > 113* 158* 132*  --    BUN 33*  33*   < > 41*   < > 36* 38*  --  37*   CREATSERUM 1.60*  1.60*   < > 1.61*   < > 1.41* 1.48* 1.51*  --    CA 8.9  8.9   < > 9.1   < > 8.8 8.4* 9.1  --    ALB 3.4   < > 3.6   < > 3.3 3.8 3.7  --      140   < > 142   < > 145 141 143   --    K 4.5  4.5   < > 4.0   < > 3.8 5.1  5.1  --  3.9   *  113*   < > 114*   < > 117* 116* 115*  --    CO2 19.0*  19.0*   < > 17.0*   < > 18.0* 18.0* 17.0*  --    ALKPHO 86  --  86  --   --   --  101  --    AST 25  --  31  --   --   --   --  24   ALT 32  --  33  --   --   --  28 25   BILT 0.3  --  0.4  --   --   --  0.3  --    TP 6.3  --  6.3  --   --   --  6.7  --     < > = values in this interval not displayed.       Estimated Creatinine Clearance: 35.2 mL/min (A) (based on SCr of 1.51 mg/dL (H)).    Recent Labs   Lab 12/04/24  0510   PTP 15.6*   INR 1.17                Culture:  No results found for this visit on 11/21/24.    Cardiac  No results for input(s): \"TROP\", \"PBNP\" in the last 168 hours.        Imaging: Imaging data reviewed in Epic.  XR ABDOMEN (1 VIEW) (CPT=74018)    Result Date: 12/9/2024  CONCLUSION:   No huang dilatation of the small bowel to suggest small bowel obstruction.  Large cecal stool burden which may indicate constipation.  Mild stool burden throughout the remainder of the colon.      Dictated by (CST): Sudarshan Everett MD on 12/09/2024 at 8:42 AM     Finalized by (CST): Sudarshan Everett MD on 12/09/2024 at 8:44 AM          XR CHEST AP PORTABLE  (CPT=71045)    Result Date: 12/9/2024  CONCLUSION:  1. Cardiomegaly.  Atherosclerotic aneurysmal thoracic aorta 2. Tracheostomy tube overlies tracheal air column. 3.  Bilateral mixed alveolar and interstitial multifocal airspace opacification has progressed.    Dictated by (CST): Woodrow Fischer MD on 12/09/2024 at 8:10 AM     Finalized by (CST): Woodrow Fischer MD on 12/09/2024 at 8:14 AM          XR CHEST AP PORTABLE  (CPT=71045)    Result Date: 12/8/2024  CONCLUSION:   Mildly improved patchy bilateral perihilar opacities.      Dictated by (CST): Karen Ureña MD on 12/08/2024 at 7:40 AM     Finalized by (CST): Karen Ureña MD on 12/08/2024 at 7:41 AM           Medications:    metoclopramide  5 mg Intravenous Q6H     polyethylene glycol (PEG 3350)  17 g Oral BID    cloNIDine  0.3 mg Oral TID    LORazepam  1 mg Per G Tube TID    carvedilol  37.5 mg Oral BID with meals    aspirin  81 mg Peg Tube Daily    QUEtiapine  50 mg Per G Tube BID    senna  5 mL Per G Tube BID    docusate  100 mg Oral BID    isosorbide dinitrate  40 mg Per NG Tube TID (Nitrates)    hydrALAZINE  150 mg Oral Q8H GABRIEL    amLODIPine  10 mg Oral Daily    heparin  5,000 Units Subcutaneous Q8H GABRIEL    chlorhexidine gluconate  15 mL Mouth/Throat BID    famotidine  20 mg Intravenous Daily         Assessment and Plan:   Assessment & Plan:      Type A aortic dissection   S/p emergent repair 11/22/24   CT chest abdomen and pelvis shows bilateral pulmonary nodular consolidation- no focal fluid collection- completed IV antibiotics- continue to monitor off of them   On IV Reglan- montior QT interval   Antihypertensives being slowly added now that PEG is in place-tube feeds held due to secretions and bowel burden  Continue bowel regimen- miralax bid, tap water enemas- GI on board   Add scopolamine patch   New picc 12/2  CV surgery, cards and critical care on consult.   TTE LVEF 75-80%.   Cont BP control per Cards  Daily spontaneous breathing trials   Discharge to LTAC once blood pressure and secretions improve   Acute hypoxic respiratory failure   Aspiration event   Completed 10 days of zosyn.   Sputum cx candida   Failed SBT multiple times. Plans for trach tomorrow. PEG 12/5   ENT and GI on consult.  Pulmonary on consult.   Lasix every other day for fluid overload- per Nephrolopgy   CXR with multifocal airspace dz  Albuterol nebs   Status post trach placement 12/4  Status post PEG placement  12/5- resume tube feeds   Seroquel started to help with agitation 50 mg BID  On precedex and propofol- unsucessful weaning due to agitation and thick secretions   H/o tobacco and ETOH abuse   Duonebs PRN   CIWA protocol  NAIMA on CKD III   Renal on consult.   [place] to be secondary to  MARY LOU/ATN   Now on lasix drip to 10 mg/hour   Doppler renal ultrasound unremarkable for renal artery stenosis  If negative Renal will add minoxidil    Essential HTN   Coreg 25 mg BID, norvasc 10mg daily, hydralazine 150mg TID. Clonidinie patch 0.2mg TID   Add hydralazine combination with isosobide 150-40 mg TID   IV lasix given- had 1 liter of urine output - now changed to lasix drip   Webb in place   ARB on hold due to NAIMA.   Iron def anemia  IV iron.   Iron level low   Transfuse for Hb < 7.0   Other medical problems  Morbid Obesity   TANYA     >55min spent, >50% spent counseling and coordinating care in the form of educating pt/family and d/w consultants and staff. Most of the time spent discussing the above plan.        Plan of care discussed with patient or family at bedside.      Missy Paulson MD  Hospitalist          Supplementary Documentation:     Quality:  DVT Prophylaxis: heparin   CODE status: Full  Dispo: per clinical course            Estimated date of discharge: TBD  Discharge is dependent on: clinical stability  At this point Ms. Wilde is expected to be discharge to: TBD

## 2024-12-10 ENCOUNTER — APPOINTMENT (OUTPATIENT)
Dept: CT IMAGING | Facility: HOSPITAL | Age: 59
DRG: 003 | End: 2024-12-10
Attending: HOSPITALIST
Payer: COMMERCIAL

## 2024-12-10 ENCOUNTER — APPOINTMENT (OUTPATIENT)
Dept: CT IMAGING | Facility: HOSPITAL | Age: 59
End: 2024-12-10
Attending: HOSPITALIST
Payer: COMMERCIAL

## 2024-12-10 LAB
ANION GAP SERPL CALC-SCNC: 10 MMOL/L (ref 0–18)
BUN BLD-MCNC: 40 MG/DL (ref 9–23)
BUN/CREAT SERPL: 23.5 (ref 10–20)
CALCIUM BLD-MCNC: 8.4 MG/DL (ref 8.7–10.4)
CHLORIDE SERPL-SCNC: 115 MMOL/L (ref 98–112)
CO2 SERPL-SCNC: 17 MMOL/L (ref 21–32)
CREAT BLD-MCNC: 1.7 MG/DL
DEPRECATED RDW RBC AUTO: 53.4 FL (ref 35.1–46.3)
EGFRCR SERPLBLD CKD-EPI 2021: 34 ML/MIN/1.73M2 (ref 60–?)
ERYTHROCYTE [DISTWIDTH] IN BLOOD BY AUTOMATED COUNT: 17.3 % (ref 11–15)
GLUCOSE BLD-MCNC: 143 MG/DL (ref 70–99)
GLUCOSE BLDC GLUCOMTR-MCNC: 125 MG/DL (ref 70–99)
GLUCOSE BLDC GLUCOMTR-MCNC: 170 MG/DL (ref 70–99)
HCT VFR BLD AUTO: 26 %
HGB BLD-MCNC: 8.7 G/DL
MCH RBC QN AUTO: 28.5 PG (ref 26–34)
MCHC RBC AUTO-ENTMCNC: 33.5 G/DL (ref 31–37)
MCV RBC AUTO: 85.2 FL
OSMOLALITY SERPL CALC.SUM OF ELEC: 306 MOSM/KG (ref 275–295)
PLATELET # BLD AUTO: 313 10(3)UL (ref 150–450)
POTASSIUM SERPL-SCNC: 4.5 MMOL/L (ref 3.5–5.1)
RBC # BLD AUTO: 3.05 X10(6)UL
SODIUM SERPL-SCNC: 142 MMOL/L (ref 136–145)
WBC # BLD AUTO: 8.1 X10(3) UL (ref 4–11)

## 2024-12-10 PROCEDURE — 99223 1ST HOSP IP/OBS HIGH 75: CPT | Performed by: STUDENT IN AN ORGANIZED HEALTH CARE EDUCATION/TRAINING PROGRAM

## 2024-12-10 PROCEDURE — 99232 SBSQ HOSP IP/OBS MODERATE 35: CPT | Performed by: OTOLARYNGOLOGY

## 2024-12-10 PROCEDURE — 99233 SBSQ HOSP IP/OBS HIGH 50: CPT | Performed by: INTERNAL MEDICINE

## 2024-12-10 PROCEDURE — 74176 CT ABD & PELVIS W/O CONTRAST: CPT | Performed by: HOSPITALIST

## 2024-12-10 PROCEDURE — 99233 SBSQ HOSP IP/OBS HIGH 50: CPT | Performed by: HOSPITALIST

## 2024-12-10 RX ORDER — FUROSEMIDE 10 MG/ML
40 INJECTION INTRAMUSCULAR; INTRAVENOUS ONCE
Status: COMPLETED | OUTPATIENT
Start: 2024-12-10 | End: 2024-12-10

## 2024-12-10 RX ORDER — METOPROLOL TARTRATE 1 MG/ML
5 INJECTION, SOLUTION INTRAVENOUS EVERY 6 HOURS
Status: DISCONTINUED | OUTPATIENT
Start: 2024-12-10 | End: 2024-12-14 | Stop reason: ALTCHOICE

## 2024-12-10 RX ORDER — ACETAMINOPHEN 10 MG/ML
1000 INJECTION, SOLUTION INTRAVENOUS EVERY 6 HOURS PRN
Status: DISCONTINUED | OUTPATIENT
Start: 2024-12-10 | End: 2024-12-20

## 2024-12-10 RX ORDER — CLONIDINE 0.3 MG/24H
1 PATCH, EXTENDED RELEASE TRANSDERMAL WEEKLY
Status: DISCONTINUED | OUTPATIENT
Start: 2024-12-11 | End: 2024-12-23

## 2024-12-10 NOTE — PROGRESS NOTES
[facility] Pharmacy Dosing Service      Initial Pharmacokinetic Consult for Vancomycin Dosing     Alisha Wilde is a 59 year old female who is being initiated on vancomycin therapy for bacteremia.  Pharmacy has been asked to dose vancomycin by KATHERINE Griffith.  The initial treatment and monitoring approach will be steady state AUC strategy.        Weight and Temperature:    Wt Readings from Last 1 Encounters:   24 135 kg (297 lb 9.9 oz)        Temp Readings from Last 1 Encounters:   24 97.7 °F (36.5 °C) (Axillary)      Labs:   Recent Labs   Lab 24  0637 24  0609 24  1453   CREATSERUM 1.48* 1.51* 1.53*      Estimated Creatinine Clearance: 34.7 mL/min (A) (based on SCr of 1.53 mg/dL (H)).     Recent Labs   Lab 24  0627 24  0637 24  0609   WBC 8.5 9.9 10.2          The Pharmacokinetic Target is:     to 600 mg-h/L and trough <=15 mg/L    Renal Dosing Considerations:    CKD     Assessment/Plan:   Initial/Loading dose: Will receive 1750 mg IV (12.5 mg/kg) x 1 initial dose.      Maintenance dose: Pharmacy will dose vancomycin at 1750 mg IV every 24 hours    Monitorin) Plan for vancomycin peak and trough to be obtained at steady state    2) Pharmacy will order SCr as clinically indicated to assess renal function.    3) Pharmacy will monitor for toxicity and efficacy, adjust vancomycin dose and/or frequency, and order vancomycin levels as appropriate per the Pharmacy and Therapeutics Committee approved protocol until discontinuation of the medication.       We appreciate the opportunity to assist in the care of this patient.     Caroline Jaramillo, PharmD  2024  5:55 PM  [facility] Extension: [phone]

## 2024-12-10 NOTE — PROGRESS NOTES
Progress Note     Alisha Wilde Patient Status:  Inpatient    10/25/1965 MRN [id number]   Location [facility] 2W/SW Attending Missy Paulson MD   Hosp Day # 18 PCP Bridger Avendano MD     Chief Complaint: S/p tracheostomy 2024    Subjective:   S: Patient with fecal matter type secretions. Tracheostomy noted to be soiled.     Review of Systems:   10 point ROS completed and was negative, except for pertinent positive and negatives stated in subjective.    Objective:   Vital signs:  Temp:  [97.7 °F (36.5 °C)-99.5 °F (37.5 °C)] 99 °F (37.2 °C)  Pulse:  [72-96] 75  Resp:  [17-30] 19  BP: (115-166)/() 127/54  SpO2:  [100 %] 100 %  FiO2 (%):  [30 %] 30 %    Wt Readings from Last 3 Encounters:   24 297 lb 9.9 oz (135 kg)   10/24/24 254 lb (115.2 kg)   24 249 lb (112.9 kg)       Intake/Output:    Intake/Output Summary (Last 24 hours) at 12/10/2024 1106  Last data filed at 12/10/2024 0500  Gross per 24 hour   Intake 2398.43 ml   Output 3945 ml   Net -1546.57 ml         Vent Mode: PRVC/AC  FiO2 (%):  [30 %] 30 %  S RR:  [18] 18  S VT:  [550 mL] 550 mL  PEEP/CPAP (cm H2O):  [5 cm H20] 5 cm H20  MAP (cm H2O):  [13-15] 14  General: no apparent distress  Respiratory: On ventilatory support  ENT:  --OC/OP: Tongue is edematous and protruding from the mouth.   --Neck: 8 cuffed portex tracheostomy in place, cuff inflated, faceplate sutures and stay sutures in place, soft trach collar in place; the tracheostomy and dressings are noted to be soiled with brown secretions     Tracheostomy change 12/10/2024  Patient was positioned supine with neck extended. 4 point faceplate sutures were cut. The tracheostomy as suctioned. The soiled dressing and doroteo collar were removed.   The cuff was deflated. The ventilator was disconnected. The soiled tracheostomy as removed. The tracheostomy site was cleaned. The tracheostomy site was suctioned. The stoma was examined and the trachea was visualized. A new 8 portex  cuffed tracheostomy with obturator in place was placed into the trachea. The obturator as removed and an inner cannula was placed. The cuff was inflated. Respiratory secretions were suctioned and a soft collar was placed. The stay suture was cut. Patient was noted to be ventilating appropriately.     Results:   Diagnostic Data:      Labs:    Selected labs - last 24 hours:  Endo  Lytes  Renal   Glu 143  Na 142 Ca 8.4  BUN 40   POC Gluc  170  K 4.5 PO4 -  Cr 1.70   A1c -  Cl 115 Mg -  eGFR 34   TSH -  CO2 17.0         LFT  CBC  Other   AST -  WBC 8.1  PTT - Procal -   ALT -  Hb 8.7  INR - CRP -   APk -  Hct 26.0  Trop - D dim -   T miranda -  .0  pBNP -  BNP -  - Ferritin  -   Prot -    CK  - Lactate  -   Alb -    LDL  - COVID  -     Imaging: Imaging data reviewed in Epic.    Medications:    sodium bicarbonate  50 mEq Intravenous Once    [Held by provider] metoclopramide  5 mg Intravenous Q6H    [Held by provider] polyethylene glycol (PEG 3350)  17 g Oral BID    cloNIDine  0.3 mg Oral TID    piperacillin-tazobactam  3.375 g Intravenous Q8H    vancomycin  12.5 mg/kg Intravenous Q24H    LORazepam  1 mg Per G Tube TID    carvedilol  37.5 mg Oral BID with meals    [Held by provider] aspirin  81 mg Peg Tube Daily    QUEtiapine  50 mg Per G Tube BID    [Held by provider] senna  5 mL Per G Tube BID    [Held by provider] docusate  100 mg Oral BID    isosorbide dinitrate  40 mg Per NG Tube TID (Nitrates)    hydrALAZINE  150 mg Oral Q8H GABRIEL    amLODIPine  10 mg Oral Daily    heparin  5,000 Units Subcutaneous Q8H [facility]    chlorhexidine gluconate  15 mL Mouth/Throat BID    famotidine  20 mg Intravenous Daily       Assessment & Plan:   ASSESSMENT / PLAN:     Respiratory failure and prolonged intubation s/p tracheostomy 12/4/2024    -8 cuffed portex, cuff inflated, in place, tracheostomy changed without issue, sutures cut  -Continue PRN suctioning  -Continue PRN dressing changes  -Continue PRN inner cannula cleaning   -Obturator  and extra inner cannula is at the bedside.     Myron Cai MD    Supplementary Documentation:

## 2024-12-10 NOTE — RESPIRATORY THERAPY NOTE
Trach care and suction provided, bilateral bs, no acute events or changes made overnight, RT will continue to monitor.           12/10/24 0334   Vent Information   Interface Invasive   Vent Type AV   Vent plugged into main power? Yes   Vent Mode PRVC/AC   Settings   FiO2 (%) 30 %   Resp Rate (Set) 18   Vt (Set, mL) 550 mL   Waveform Decelerating ramp   PEEP/CPAP (cm H2O) 5 cm H20   Insp Time (sec) 0.85 sec   Trigger Sensitivity Flow (L/min) 1 L/min   Humidification Heat and moisture exchanger   H2O Bag Level 1/4 Full   Heater Temperature 98.6 °F (37 °C)   Readings   Total RR 19   Minute Ventilation (L/min) 11.5 L/min   Expiratory Tidal Volume 590 mL   PIP Observed (cm H2O) 37 cm H2O   MAP (cm H2O) 14   I/E Ratio 1:2.9   Plateau Pressure (cm H2O) 33 cm H2O   Static Compliance (L/cm H2O) 21   Dynamic Compliance (L/cm H2O) 22 L/cm H2O

## 2024-12-10 NOTE — PROGRESS NOTES
[facility]     Gastroenterology Progress Note    Alisha Wilde Patient Status:  Inpatient    10/25/1965 MRN [id number]   Location [facility] 2W/SW Attending Missy Paulson MD   Hosp Day # 18 PCP Bridger Avendano MD       Subjective:   High residual tube feeds.  Obtain CT abdomen pelvis overnight.  PEG tube to vent with dark brown output.  Objective:   Blood pressure 146/57, pulse 79, temperature 98.7 °F (37.1 °C), temperature source Axillary, resp. rate 24, height 5' 5\" (1.651 m), weight 297 lb 9.9 oz (135 kg), SpO2 100%, not currently breastfeeding. Body mass index is 49.53 kg/m².    Gen: drowsy, NAD  HEENT: mucus membranes appear moist, trach to vent  CV: RRR  Lung: no conversational dyspnea   Abdomen: soft NT, distended abdomen with hypoactive BS appreciated, peg luq  Skin: dry, warm, no jaundice  Ext: +edema  Neuro: sedated    Assessment and Plan:   Alisha Wilde is a 59 year old female w/ PMHx of hypertension, GERD, CKD, hyperlipidemia who presented to ER 2024 for chest pain. Underwent emergent repair of aortic type A dissection on 2024. Inability to wean from the ventillator now s/p trach and PEG placement -.  GI reconsulted for high residual TF.     # Small bowel dilation, likely ileus less likely evolving SBO  -s/p PEG   -High TF residuals since onset  -CT abdomen pelvis with dilated fluid-filled loops of small bowel, no transition point  -keep PEG to low intermittent suction. If poor output may need NGT to LIS. Keep NPO, stop tube feeds.   -Repeat KUB in morning  -Avoid dysmotility agents    D/w nursing    Toma Barrientos MD      Results:     Lab Results   Component Value Date    WBC 8.1 12/10/2024    HGB 8.7 (L) 12/10/2024    HCT 26.0 (L) 12/10/2024    .0 12/10/2024    CREATSERUM 1.70 (H) 12/10/2024    BUN 40 (H) 12/10/2024     12/10/2024    K 4.5 12/10/2024     (H) 12/10/2024    CO2 17.0 (L) 12/10/2024     (H) 12/10/2024    CA 8.4  (L) 12/10/2024    ALB 3.7 12/09/2024    ALKPHO 101 12/09/2024    BILT 0.3 12/09/2024    TP 6.7 12/09/2024    AST 24 12/09/2024    ALT 25 12/09/2024    PTT 30.7 12/04/2024    INR 1.17 12/04/2024    TSH 2.085 12/07/2024    NATHALIE 99 11/25/2024    LIP 28 11/25/2024    DDIMER 0.42 12/08/2019    ESRML 15 06/05/2021    MG 2.5 12/09/2024    PHOS 4.2 12/09/2024    TROP <0.045 12/08/2019     (H) 09/23/2021    B12 1,156 (H) 07/23/2018       CT ABDOMEN+PELVIS(CPT=74176)    Result Date: 12/9/2024  CONCLUSION:  1. There are dilated and fluid-filled loops of small bowel without clear transition point to suggest mechanical bowel obstruction. Differential diagnostic considerations include underlying infectious/inflammatory enteritis, malabsorption, or early, developing bowel obstruction.   2. Extensive uncomplicated distal colonic diverticulosis.  3. The urinary bladder remains distended despite placement of a Webb catheter.  4. Hepatomegaly with underlying hepatic steatosis.  5. Mild anasarca.  6. Partially visualized postthoracotomy chest demonstrating small pleural effusions and associated basilar atelectasis, with or without superimposed pneumonia.  7. Additional branching opacities are evident and may reflect small airways disease.   8. Low-density appearance of the intracardiac blood pool raises the possibility of underlying anemia. Correlate with hematologic parameters.  9. Lesser incidental findings as above.    Dictated by (CST): Eulalio Shaikh MD on 12/09/2024 at 6:19 PM     Finalized by (CST): Eulalio Shaikh MD on 12/09/2024 at 6:31 PM          XR ABDOMEN (1 VIEW) (CPT=74018)    Result Date: 12/9/2024  CONCLUSION:   No huang dilatation of the small bowel to suggest small bowel obstruction.  Large cecal stool burden which may indicate constipation.  Mild stool burden throughout the remainder of the colon.      Dictated by (CST): Sudarshan Everett MD on 12/09/2024 at 8:42 AM     Finalized by (CST): Sudarshan Everett MD on  12/09/2024 at 8:44 AM          XR CHEST AP PORTABLE  (CPT=71045)    Result Date: 12/9/2024  CONCLUSION:  1. Cardiomegaly.  Atherosclerotic aneurysmal thoracic aorta 2. Tracheostomy tube overlies tracheal air column. 3.  Bilateral mixed alveolar and interstitial multifocal airspace opacification has progressed.    Dictated by (CST): Woodrow Fischer MD on 12/09/2024 at 8:10 AM     Finalized by (CST): Woodrow Fischer MD on 12/09/2024 at 8:14 AM

## 2024-12-10 NOTE — RESPIRATORY THERAPY NOTE
Patient received intubated with Portex 8 trach, and on ventilator 18/550/+5/30%.     Trach rplaced bedside by ENT today.    Bilateral breath sounds auscultated. Suction provided when indicated.     Peak pressures remain high.    RT will continue to monitor.

## 2024-12-10 NOTE — PROGRESS NOTES
[facility]  part of [facility]    Progress Note    Alisha Wilde Patient Status:  Inpatient    10/25/1965 MRN [id number]   Location [facility] 2W/SW Attending Hayde Brito MD   Hosp Day # 18 PCP Bridger Avendano MD     Chief Complaint:   Chief Complaint   Patient presents with    Chest Pain Angina       Subjective:   Alisha Wilde has been having stool in the peg tube, the trach and suctioning. CT scan done yesteday shows no obstruction. Sister at bedside.     Objective:   Objective:    Blood pressure 144/58, pulse 79, temperature 99.3 °F (37.4 °C), temperature source Axillary, resp. rate 19, height 5' 5\" (1.651 m), weight 297 lb 9.9 oz (135 kg), SpO2 100%, not currently breastfeeding.    Physical Exam:    General: No acute distress. Intubated.   Respiratory: Diminished bases   Cardiovascular: S1, S2. Regular rate and rhythm. No murmurs, rubs or gallops.   Abdomen: Soft, nontender, distended.  Positive bowel sounds. No rebound or guarding.  Extremities: 2+ pitting edema x 4 ext    Results:   Results:    Labs:  Recent Labs   Lab 24  0510 24  1728 24  0534 24  0627 24  0637 24  0609 12/10/24  0607   WBC 11.9*   < > 9.5 8.5 9.9 10.2 8.1   HGB 7.7*   < > 8.5* 8.4* 10.9* 9.0* 8.7*   MCV 83.7   < > 83.8 87.3 87.2 87.1 85.2   .0   < > 264.0 194.0 270.0 165.0 313.0   BAND 4  --   --   --   --   --   --    INR 1.17  --   --   --   --   --   --     < > = values in this interval not displayed.       Recent Labs   Lab 24  0416 24  0534 24  0627 24  0637 24  0609 24  0720 24  1453 12/10/24  0607   *   < > 113* 158* 132*  --  110* 143*   BUN 41*   < > 36* 38*  --  37* 33* 40*   CREATSERUM 1.61*   < > 1.41* 1.48* 1.51*  --  1.53* 1.70*   CA 9.1   < > 8.8 8.4* 9.1  --  9.2 8.4*   ALB 3.6   < > 3.3 3.8 3.7  --   --   --       < > 145 141 143  --  141 142   K 4.0   < > 3.8 5.1  5.1  --  3.9 3.9 4.5   CL  114*   < > 117* 116* 115*  --  112 115*   CO2 17.0*   < > 18.0* 18.0* 17.0*  --  28.0 17.0*   ALKPHO 86  --   --   --  101  --   --   --    AST 31  --   --   --   --  24  --   --    ALT 33  --   --   --  28 25  --   --    BILT 0.4  --   --   --  0.3  --   --   --    TP 6.3  --   --   --  6.7  --   --   --     < > = values in this interval not displayed.       Estimated Creatinine Clearance: 31.3 mL/min (A) (based on SCr of 1.7 mg/dL (H)).    Recent Labs   Lab 12/04/24  0510   PTP 15.6*   INR 1.17                Culture:  No results found for this visit on 11/21/24.    Cardiac  No results for input(s): \"TROP\", \"PBNP\" in the last 168 hours.        Imaging: Imaging data reviewed in [facility].  CT ABDOMEN+PELVIS(CPT=74176)    Result Date: 12/9/2024  CONCLUSION:  1. There are dilated and fluid-filled loops of small bowel without clear transition point to suggest mechanical bowel obstruction. Differential diagnostic considerations include underlying infectious/inflammatory enteritis, malabsorption, or early, developing bowel obstruction.   2. Extensive uncomplicated distal colonic diverticulosis.  3. The urinary bladder remains distended despite placement of a Webb catheter.  4. Hepatomegaly with underlying hepatic steatosis.  5. Mild anasarca.  6. Partially visualized postthoracotomy chest demonstrating small pleural effusions and associated basilar atelectasis, with or without superimposed pneumonia.  7. Additional branching opacities are evident and may reflect small airways disease.   8. Low-density appearance of the intracardiac blood pool raises the possibility of underlying anemia. Correlate with hematologic parameters.  9. Lesser incidental findings as above.    Dictated by (CST): Eulalio Shaikh MD on 12/09/2024 at 6:19 PM     Finalized by (CST): Eulalio Shaikh MD on 12/09/2024 at 6:31 PM          XR ABDOMEN (1 VIEW) (CPT=74018)    Result Date: 12/9/2024  CONCLUSION:   No huang dilatation of the small bowel to  suggest small bowel obstruction.  Large cecal stool burden which may indicate constipation.  Mild stool burden throughout the remainder of the colon.      Dictated by (CST): Sudarshan Everett MD on 12/09/2024 at 8:42 AM     Finalized by (CST): Sudarshan Everett MD on 12/09/2024 at 8:44 AM          XR CHEST AP PORTABLE  (CPT=71045)    Result Date: 12/9/2024  CONCLUSION:  1. Cardiomegaly.  Atherosclerotic aneurysmal thoracic aorta 2. Tracheostomy tube overlies tracheal air column. 3.  Bilateral mixed alveolar and interstitial multifocal airspace opacification has progressed.    Dictated by (CST): Woodrow Fischer MD on 12/09/2024 at 8:10 AM     Finalized by (CST): Woodrow Fischer MD on 12/09/2024 at 8:14 AM           Medications:    lidocaine (cardiac)        atropine        EPINEPHrine        [Held by provider] metoclopramide  5 mg Intravenous Q6H    [Held by provider] polyethylene glycol (PEG 3350)  17 g Oral BID    cloNIDine  0.3 mg Oral TID    piperacillin-tazobactam  3.375 g Intravenous Q8H    vancomycin  12.5 mg/kg Intravenous Q24H    LORazepam  1 mg Per G Tube TID    carvedilol  37.5 mg Oral BID with meals    [Held by provider] aspirin  81 mg Peg Tube Daily    QUEtiapine  50 mg Per G Tube BID    [Held by provider] senna  5 mL Per G Tube BID    [Held by provider] docusate  100 mg Oral BID    isosorbide dinitrate  40 mg Per NG Tube TID (Nitrates)    hydrALAZINE  150 mg Oral Q8H GABRIEL    amLODIPine  10 mg Oral Daily    heparin  5,000 Units Subcutaneous Q8H GABRIEL    chlorhexidine gluconate  15 mL Mouth/Throat BID    famotidine  20 mg Intravenous Daily         Assessment and Plan:   Assessment & Plan:      Type A aortic dissection   Ileus   S/p emergent repair 11/22/24   CT chest abdomen and pelvis shows bilateral pulmonary nodular consolidation- no focal fluid collection- completed IV antibiotics- continue to monitor off of them   On IV Reglan- montior QT interval   Antihypertensives being slowly added now that PEG is  in place-tube feeds held due to secretions and bowel burden  Continue bowel regimen- miralax bid, tap water enemas- GI on board   Unfortunately stool is now coming out of the peg tube, trach collar and suctioning  Will order another CT scan to see if there is possible obstruction  Will most likely need to place NG tube- discussed with surgery and nursing   Add scopolamine patch   New picc 12/2  CV surgery, cards and critical care on consult.   TTE LVEF 75-80%.   Cont BP control per Cards  Daily spontaneous breathing trials   Plan is to eventually discharge to LTAC  PEG tube to low intermittent suction per GI- TFs stopped   Acute hypoxic respiratory failure   Aspiration event   Completed 10 days of zosyn.   Sputum cx candida   Failed SBT multiple times. Plans for trach tomorrow. PEG 12/5   ENT and GI on consult.  Pulmonary on consult.   CXR with multifocal airspace dz  Albuterol nebs   Status post trach placement 12/4  Status post PEG placement  12/5- resume tube feeds   Seroquel started to help with agitation 50 mg BID  On precedex and propofol- unsucessful weaning due to agitation and thick secretions   H/o tobacco and ETOH abuse   Duonebs PRN   CIWA protocol  NAIMA on CKD III   Renal on consult.   Garita to be secondary to MARY LOU/ATN   Now on lasix drip to 10 mg/hour   Doppler renal ultrasound unremarkable for renal artery stenosis  If negative Renal will add minoxidil    Essential HTN   Coreg 25 mg BID, norvasc 10mg daily, hydralazine 150mg TID. Clonidinie patch 0.2mg TID   Add hydralazine combination with isosobide 150-40 mg TID   IV lasix given- had 1 liter of urine output - now changed to lasix drip   Webb in place   ARB on hold due to NAIMA.   Iron def anemia  IV iron.   Iron level low   Transfuse for Hb < 7.0   Other medical problems  Morbid Obesity   TANYA     >55min spent, >50% spent counseling and coordinating care in the form of educating pt/family and d/w consultants and staff. Most of the time spent discussing  the above plan.        Plan of care discussed with patient or family at bedside.      Missy Paulson MD  Hospitalist          Supplementary Documentation:     Quality:  DVT Prophylaxis: heparin   CODE status: Full  Dispo: per clinical course            Estimated date of discharge: TBD  Discharge is dependent on: clinical stability  At this point Ms. Wilde is expected to be discharge to: TBD

## 2024-12-10 NOTE — PROGRESS NOTES
[facility]  part of [facility]     Progress Note    Alisha Wilde Patient Status:  Inpatient    10/25/1965 MRN [id number]   Location [facility] 2W/SW Attending Aguila Villegas MD   Hosp Day # 18 PCP Bridger Avendano MD       Subjective:   Patient seen and examined.  Resting in bed.  Trach exchanged today.  No significant vomiting noted today.  Objective:   Blood pressure 146/57, pulse 79, temperature 98.7 °F (37.1 °C), temperature source Axillary, resp. rate 24, height 5' 5\" (1.651 m), weight 297 lb 9.9 oz (135 kg), SpO2 100%, not currently breastfeeding.  Intake/Output:   Last 3 shifts: I/O last 3 completed shifts:  In: 4601 [I.V.:3021; NG/GT:880; IV PIGGYBACK:700]  Out: 5945 [Urine:4200; Emesis/NG output:1745]   This shift: I/O this shift:  In: -   Out: 800 [Urine:800]     Vent Settings: Vent Mode: PRVC/AC  FiO2 (%):  [30 %] 30 %  S RR:  [18] 18  S VT:  [550 mL] 550 mL  PEEP/CPAP (cm H2O):  [5 cm H20] 5 cm H20  MAP (cm H2O):  [13-15] 14    Hemodynamic parameters (last 24 hours):      Scheduled Meds:   Current Facility-Administered Medications   Medication Dose Route Frequency    dexmedeTOMIDine (Precedex) 800 mcg in sodium chloride 0.9% 100 mL infusion  0.2-1.5 mcg/kg/hr (Dosing Weight) Intravenous Continuous    [Held by provider] metoclopramide (Reglan) 5 mg/mL injection 5 mg  5 mg Intravenous Q6H    [Held by provider] polyethylene glycol (PEG 3350) (Miralax) 17 g oral packet 17 g  17 g Oral BID    cloNIDine (Catapres) tab 0.3 mg  0.3 mg Oral TID    sodium chloride 3 % nebulizer solution 3 mL  3 mL Nebulization PRN    albuterol (Ventolin) (2.5 MG/3ML) 0.083% nebulizer solution 2.5 mg  2.5 mg Nebulization Q6H PRN    piperacillin-tazobactam (Zosyn) 3.375 g in dextrose 5% 100 mL IVPB-ADDV  3.375 g Intravenous Q8H    vancomycin (Vancocin) 1.75 g in sodium chloride 0.9% 500mL IVPB premix  12.5 mg/kg Intravenous Q24H    hydrALAzine (Apresoline) 20 mg/mL injection 10 mg  10 mg  Intravenous Q4H PRN    LORazepam (Ativan) tab 1 mg  1 mg Per G Tube TID    carvedilol (Coreg) tab 37.5 mg  37.5 mg Oral BID with meals    acetaminophen (Tylenol) 160 MG/5ML oral liquid 650 mg  650 mg Oral Q6H PRN    [Held by provider] aspirin chewable tab 81 mg  81 mg Peg Tube Daily    QUEtiapine (SEROquel) tab 50 mg  50 mg Per G Tube BID    niCARdipine (carDENE) 125 mg in sodium chloride 0.9% 250 mL infusion  5-15 mg/hr Intravenous Continuous    [Held by provider] senna (Senokot) 8.8 MG/5ML oral syrup 8.8 mg  5 mL Per G Tube BID    [Held by provider] docusate (Colace) 50 MG/5ML oral solution 100 mg  100 mg Oral BID    isosorbide dinitrate (Isordil) tab 40 mg  40 mg Per NG Tube TID (Nitrates)    hydrALAZINE (Apresoline) tab 150 mg  150 mg Oral Q8H GABRIEL    amLODIPine (Norvasc) tab 10 mg  10 mg Oral Daily    ipratropium-albuterol (Duoneb) 0.5-2.5 (3) MG/3ML inhalation solution 3 mL  3 mL Nebulization Q6H PRN    HYDROcodone-acetaminophen (Norco) 5-325 MG per tab 2 tablet  2 tablet Oral Q4H PRN    heparin (Porcine) 5000 UNIT/ML injection 5,000 Units  5,000 Units Subcutaneous Q8H GABRIEL    melatonin tab 3 mg  3 mg Oral Nightly PRN    bisacodyl (Dulcolax) 10 MG rectal suppository 10 mg  10 mg Rectal Daily PRN    potassium chloride 20 mEq/100mL IVPB premix 20 mEq  20 mEq Intravenous PRN    Or    potassium chloride 40 mEq/100mL IVPB premix (central line) 40 mEq  40 mEq Intravenous PRN    magnesium sulfate in dextrose 5% 1 g/100mL infusion premix 1 g  1 g Intravenous PRN    magnesium sulfate in sterile water for injection 2 g/50mL IVPB premix 2 g  2 g Intravenous PRN    chlorhexidine gluconate (Peridex) 0.12 % oral solution 15 mL  15 mL Mouth/Throat BID    propofol (Diprivan) 10 mg/mL infusion premix  5-50 mcg/kg/min (Dosing Weight) Intravenous Continuous    famotidine (Pepcid) 20 mg/2mL injection 20 mg  20 mg Intravenous Daily       Continuous Infusions:    dexmedetomidine 1.5 mcg/kg/hr (12/10/24 1022)    niCARdipine 10 mg/hr  (12/10/24 0527)    propofol 50 mcg/kg/min (12/10/24 1314)       Physical Exam  Constitutional: no acute distress  Eyes: PERRL  ENT: nares pateint + tracheostomy in place  Neck: supple, no JVD  Cardio: RRR, S1 S2  Respiratory: Bilateral crackles  GI: abdomen soft, non tender, active bowel sounds, no organomegaly + PEG tube in place  Extremities: no clubbing, cyanosis, edema  Neurologic: no gross motor deficits  Skin: warm, dry      Results:     Lab Results   Component Value Date    WBC 8.1 12/10/2024    HGB 8.7 12/10/2024    HCT 26.0 12/10/2024    .0 12/10/2024    CREATSERUM 1.70 12/10/2024    BUN 40 12/10/2024     12/10/2024    K 4.5 12/10/2024     12/10/2024    CO2 17.0 12/10/2024     12/10/2024    CA 8.4 12/10/2024       CT ABDOMEN+PELVIS(CPT=74176)    Result Date: 12/9/2024  CONCLUSION:  1. There are dilated and fluid-filled loops of small bowel without clear transition point to suggest mechanical bowel obstruction. Differential diagnostic considerations include underlying infectious/inflammatory enteritis, malabsorption, or early, developing bowel obstruction.   2. Extensive uncomplicated distal colonic diverticulosis.  3. The urinary bladder remains distended despite placement of a Webb catheter.  4. Hepatomegaly with underlying hepatic steatosis.  5. Mild anasarca.  6. Partially visualized postthoracotomy chest demonstrating small pleural effusions and associated basilar atelectasis, with or without superimposed pneumonia.  7. Additional branching opacities are evident and may reflect small airways disease.   8. Low-density appearance of the intracardiac blood pool raises the possibility of underlying anemia. Correlate with hematologic parameters.  9. Lesser incidental findings as above.    Dictated by (CST): Eulalio Shaikh MD on 12/09/2024 at 6:19 PM     Finalized by (CST): Eulalio Shaikh MD on 12/09/2024 at 6:31 PM          XR ABDOMEN (1 VIEW) (CPT=74018)    Result Date:  12/9/2024  CONCLUSION:   No huang dilatation of the small bowel to suggest small bowel obstruction.  Large cecal stool burden which may indicate constipation.  Mild stool burden throughout the remainder of the colon.      Dictated by (CST): Sudarshan Everett MD on 12/09/2024 at 8:42 AM     Finalized by (CST): Sudarshan Everett MD on 12/09/2024 at 8:44 AM          XR CHEST AP PORTABLE  (CPT=71045)    Result Date: 12/9/2024  CONCLUSION:  1. Cardiomegaly.  Atherosclerotic aneurysmal thoracic aorta 2. Tracheostomy tube overlies tracheal air column. 3.  Bilateral mixed alveolar and interstitial multifocal airspace opacification has progressed.    Dictated by (CST): Woodrow Fischer MD on 12/09/2024 at 8:10 AM     Finalized by (CST): Woodrow Fischer MD on 12/09/2024 at 8:14 AM                 Assessment   1.  Type A aortic dissection status postrepair  2.  Acute hypoxemic respiratory failure  3.  Acute kidney injury on chronic kidney disease  4.  Aspiration pneumonia  5.  Anemia   6.  Prior nicotine dependence  7.  EtOH abuse     Plan   -Patient presenting with evidence of type a aortic dissection status post repair by CV surgery on 11/21/2024.  -Postoperative respiratory failure complicated by self extubation with emesis and aspiration noted afterwards requiring reintubation.  -Status post tracheostomy on 12/4/2024 and subsequent PEG on 12/5/2024.  -Spontaneous breathing trial on hold given significant uncontrolled hypertension and episodes of emesis.  -CT abdomen pelvis on 12/9/2024 with dilated fluid-filled loops of small bowel.  PEG tube placed to low intermittent suction.  -Attempt to wean Cardene  -Frequent suctioning.  Bronchial hygiene.    -Closely monitor renal function and urine output  -DVT prophylaxis: Heparin  -Discussed with nursing staff        Dmitri Herman,   Pulmonary Critical Care Medicine  [facility]

## 2024-12-10 NOTE — RESPIRATORY THERAPY NOTE
Patient received with tracheostomy and on ventilator. Tracheostomy care provided. Patient requires chronic ventilator support and is not a candidate for SBT. Bilateral breath sounds auscultated. Suction provided when indicated. No acute events. RT will continue to monitor.

## 2024-12-10 NOTE — PAYOR COMM NOTE
--------------  CONTINUED STAY REVIEW    Payor: [employer]/HMO/POS/EPO  Subscriber #:  [id number]  Authorization Number: [id number]    Admit date: 11/22/24  Admit time:  1:28 AM    REVIEW DOCUMENTATION:   12-9-24     Vomited this am. Not tolerating TF. Had high residuals    PHYSICAL EXAM:  /62 (BP Location: Left arm)   Pulse 75   Temp 98.3 °F (36.8 °C) (Axillary)   Resp 23   Ht 5' 5\" (1.651 m)   Wt 297 lb 9.9 oz (135 kg)   SpO2 100%   BMI 49.53 kg/m²   Vent Mode: PRVC/AC  FiO2 (%):  [30 %] 30 %  S RR:  [18] 18  S VT:  [550 mL] 550 mL  PEEP/CPAP (cm H2O):  [5 cm H20] 5 cm H20  MAP (cm H2O):  [12-15] 15  CONSTITUTIONAL: intubated, sedated but opens eyes  HEENT: atraumatic normocephalic  MOUTH: MMM, large tongue  NECK/THROAT: no JVD. Trachea midline. No obvious thyromegaly. + trach with min bleeding   LUNG: vented upper clear BS b/l no wheezing, + crackles. Diminished at bases. Chest symmetric with respiratory motion. + midsternal surgical wound healing   HEART: regular rate and rhythm, no obvious murmers or gallops noted  ABD: soft distended and tender. + hypoactive bowel sounds. No organomegaly noted. + PEG tube   EXT: no clubbing, cyanosis, or edema noted. Pulses intact grossly  NEURO/MUSCULOSKELETAL: intubated sedated but opens eyes   SKIN: warm, dry. No obvious lesions noted  LYMPH: no obvious LAD        IMAGES:   CXR 12/9/24  CONCLUSION:   1. Cardiomegaly.  Atherosclerotic aneurysmal thoracic aorta   2. Tracheostomy tube overlies tracheal air column.   3.  Bilateral mixed alveolar and interstitial multifocal airspace opacification has progressed.        KUB 12/9/24  CONCLUSION:   No huang dilatation of the small bowel to suggest small bowel obstruction.   Large cecal stool burden which may indicate constipation.  Mild stool burden throughout the remainder of the colon.     Lab 12/07/24  0627 12/08/24  0637 12/09/24  0609   RBC 3.00* 3.84 3.18*   HGB 8.4* 10.9* 9.0*   HCT 26.2* 33.5*  27.7*   MCV 87.3 87.2 87.1   MCH 28.0 28.4 28.3   MCHC 32.1 32.5 32.5   RDW 17.2* 17.9* 17.3*   NEPRELIM 5.57 6.94 7.06   WBC 8.5 9.9 10.2   .0 270.0 165.0                    Recent Labs   Lab 12/03/24  0312 12/04/24  0510 12/05/24  0416 12/06/24  0534 12/07/24  0627 12/08/24  0637 12/09/24  0609 12/09/24  0720   *  130*   < > 120*   < > 113* 158* 132*  --    BUN 33*  33*   < > 41*   < > 36* 38*  --  37*   CREATSERUM 1.60*  1.60*   < > 1.61*   < > 1.41* 1.48* 1.51*  --    EGFRCR 36*  36*   < > 36*   < > 42* 40* 39*  --    CA 8.9  8.9   < > 9.1   < > 8.8 8.4* 9.1  --    ALB 3.4   < > 3.6   < > 3.3 3.8 3.7  --      140   < > 142   < > 145 141 143  --    K 4.5  4.5   < > 4.0   < > 3.8 5.1  5.1  --  3.9   *  113*   < > 114*   < > 117* 116* 115*  --    CO2 19.0*  19.0*   < > 17.0*   < > 18.0* 18.0* 17.0*  --    ALKPHO 86  --  86  --   --   --  101  --    AST 25  --  31  --   --   --   --  24   ALT 32  --  33  --   --   --  28 25   BILT 0.3  --  0.4  --   --   --  0.3  --    TP 6.3  --  6.3  --   --   --  6.7  --     < > = values in this interval not displayed.         ASSESSMENT/PLAN:  Type A aortic dissection s/p repair now intubated in ICU  -on maxed nicardipine gtt as per CV surgery  -on multiple oral antiHTN meds for BP control  -s/p pRBC transfusion on 12/4 with repeat hg stable     Post op acute respiratory failure  -complicated by extubation and reimergent intubation and significant emesis concerning for aspiration PNA vs pneumonitis  -started on zosyn-completed 10 day course  -checked ETT asp-candida likely contaminant  -failed multiple SBTs d/t increased RR, work of breathing, hypertension, hypoxemia, significant thick secretions  -hx of likely TANYA and previous smoking hx. Has sign dense consolidations and atelectasis on chest CT.  -s/p trach by ENT on 12/4/24 and PEG by GI on 12/5/24  -start trach/vent weaning. Tolerated 5/5 SBT on Friday for 30 min  -hold further SBT today  b/c of vomiting and uncontrolled bp  -PRN ABG and CXR  -saline nebs  -on propofol and precedex-having difficulty weaning as pt becomes agitated and hypertensive. Started seroquel 50 mg BID  Continue scheduled clonidine in attempt to wean off precedex     Previous hx of smoking and ETOH  -continue duonebs PRN     NAIMA on CKD and metabolic acidosis  -likely from surgery, and acute issues  -monitor UO and renal labs  -renal following   -agree with trying lasix gtt     Abd pain  -s/p abd CT as above  -resolved  -not tolerating TF now. KUB with lots of gas  -agree with restarting reglan and monitoring Qtc   -consider GI consult     Proph:  -DVT: hep sq       Cardiothoracic Surgery     Had an episode of emesis this a.m., brief bradycardia, likely vasovagal episode-TF on hold.  KUB just completed.  Abd tender to palpation - diffuse, Tachypneic/HTN-remains on IV sedation, IV antihypertensives this a.m.     Assessment/Plan:  S/P EMERGENT AORTIC DISSECTION REPAIR POD # 17  Respiratory Failure w/multiple failed SBT prior to trach placement -currently on full vent support-continue daily weaning trials. Re-intubated on 11/22 per Pulm after emesis episode/ETT removal due to emesis content noted in airway.    S/P Trach placement per ENT on 12/4  Completed ABX course (Zosyn 10day) for suspected aspiration pneumonia.   CT Chest/Abdomen/Pelvis (non-contrast) 11/26=no obstruction noted/stable. +BM (s) post op  Emesis this am, KUB ordered -hold TF pend results. Reglan stopped 2/2 seroquel interaction. On Colace/Senna - last BM 12/8, Resume reglan - monitor QT/for EPS while on Seroquel. Have GI reeval  Hx: HTN: on multiple antihypertensives including IV Cardene-wean as able: SBP goal= <130/MAP >70. Mgt per Cards. New A-line placed 12/2  New PICC placed 12/2  Pain meds as needed -wean off IV as tolerated  Scds/Heparin SubQ prophylaxis DVT prevention. HIPA 11/25=negative. Plts continue >100,000  Continue ICU status   Hx: CKD. Expected post  op volume overload w/mgt per Nephrology (consulted on 11/23) for NAIMA. Limit/avoid nephrotoxic meds: Grossly volume overloaded on exam, start Lasix gtt 10mg/hr.  Webb remains for close I/O monitoring & immobility, Webb changed 12/9. Kidney US w/doppler over weekend, renal cyst unchanged  Expected post op anemia: w/o clinical significance/stable. May be slightly diluted due to volume overload.   Hx: Morbid obesity w/BMI >40-plan for RD to see when appropriate  Nutrition per TF via PEG-currently on hold with emesis this am. PEG placed 12/5 per GI.        12/09/24 0321    Vent Information   Interface Invasive   Vent Type AV   Vent plugged into main power? Yes   Vent Mode PRVC/AC   Settings   FiO2 (%) 30 %   Resp Rate (Set) 18   Vt (Set, mL) 550 mL   Waveform Decelerating ramp   PEEP/CPAP (cm H2O) 5 cm H20   Insp Time (sec) 0.85 sec   Trigger Sensitivity Flow (L/min) 1 L/min   Humidification Heater   H2O Bag Level Filled   Heater Temperature 98.6 °F (37 °C)   Readings   Total RR 18   Minute Ventilation (L/min) 10.9 L/min   Expiratory Tidal Volume 580 mL   PIP Observed (cm H2O) 36 cm H2O   MAP (cm H2O) 13   I/E Ratio 1:2.9   Plateau Pressure (cm H2O) 32 cm H2O   Static Compliance (L/cm H2O) 22   Dynamic Compliance (L/cm H2O) 18 L/cm H2O                 MEDICATIONS ADMINISTERED IN LAST 1 DAY:  amLODIPine (Norvasc) tab 10 mg       Date Action Dose Route User    12/9/2024 0812 Given 10 mg Oral KariChristy montanez RN          aspirin chewable tab 81 mg       Date Action Dose Route User    12/9/2024 0812 Given 81 mg Peg Tube Christy Pierce RN          chlorhexidine gluconate (Peridex) 0.12 % oral solution 15 mL       Date Action Dose Route User    12/9/2024 2104 Given 15 mL Mouth/Throat Micaela Arnold RN    12/9/2024 0811 Given 15 mL Mouth/Throat Christy Pierce RN          cloNIDine (Catapres) tab 0.3 mg       Date Action Dose Route User    12/9/2024 0812 Given 0.3 mg Oral Christy Pierce RN          dexmedeTOMIDine in  sodium chloride 0.9% (Precedex) 400 mcg/100mL infusion premix       Date Action Dose Route User    12/10/2024 0611 New Bag 1.5 mcg/kg/hr × 117.1 kg (Dosing Weight) Intravenous Micaela Arnold RN    12/10/2024 0500 Rate/Dose Verify 1.5 mcg/kg/hr × 117.1 kg (Dosing Weight) Intravenous Micaela Arnold RN    12/10/2024 0340 New Bag 1.5 mcg/kg/hr × 117.1 kg (Dosing Weight) Intravenous Micaela Arnold RN    12/10/2024 0200 Rate/Dose Verify 1.5 mcg/kg/hr × 117.1 kg (Dosing Weight) Intravenous Micaela Arnold RN    12/10/2024 0128 New Bag 1.5 mcg/kg/hr × 117.1 kg (Dosing Weight) Intravenous Micaela Arnold RN    12/10/2024 0000 Rate/Dose Verify 1.5 mcg/kg/hr × 117.1 kg (Dosing Weight) Intravenous Micaela Arnold RN    12/9/2024 2309 New Bag 1.5 mcg/kg/hr × 117.1 kg (Dosing Weight) Intravenous Micaela Arnold RN    12/9/2024 2200 Rate/Dose Verify 1.5 mcg/kg/hr × 117.1 kg (Dosing Weight) Intravenous Micaela Arnold RN    12/9/2024 2055 New Bag 1.5 mcg/kg/hr × 117.1 kg (Dosing Weight) Miacela Long RN    12/9/2024 2000 Rate/Dose Verify 1.4 mcg/kg/hr × 117.1 kg (Dosing Weight) Intravenous Micaela Arnold RN    12/9/2024 1941 Rate/Dose Verify 1.4 mcg/kg/hr × 117.1 kg (Dosing Weight) Intravenous Christy Pierce RN    12/9/2024 1843 New Bag 1.4 mcg/kg/hr × 117.1 kg (Dosing Weight) Intravenous Christy Pierce RN    12/9/2024 1704 New Bag 1.4 mcg/kg/hr × 117.1 kg (Dosing Weight) Intravenous Christy Pierce RN    12/9/2024 1358 New Bag 1.2 mcg/kg/hr × 117.1 kg (Dosing Weight) Intravenous Christy Pierce RN    12/9/2024 1200 Rate/Dose Verify 1.2 mcg/kg/hr × 117.1 kg (Dosing Weight) Intravenous KariChristy montanez RN    12/9/2024 1105 New Bag 1.2 mcg/kg/hr × 117.1 kg (Dosing Weight) Intravenous Christy Pierce RN    12/9/2024 0930 Rate/Dose Verify 1.2 mcg/kg/hr × 117.1 kg (Dosing Weight) Intravenous Christy Pierce RN    12/9/2024 0827 New Bag 1.2 mcg/kg/hr × 117.1 kg (Dosing Weight) Intravenous Kari,  CYNDIE Harrison    12/9/2024 0806 Rate/Dose Verify 1.2 mcg/kg/hr × 117.1 kg (Dosing Weight) Intravenous Christy Pierce RN          docusate (Colace) 50 MG/5ML oral solution 100 mg       Date Action Dose Route User    12/9/2024 0811 Given 100 mg Oral KariChristy montanez RN          famotidine (Pepcid) 20 mg/2mL injection 20 mg       Date Action Dose Route User    12/9/2024 0811 Given 20 mg Intravenous KariChristy montanez RN          fentaNYL (Sublimaze) 50 mcg/mL injection 50 mcg       Date Action Dose Route User    12/9/2024 0811 Given 50 mcg Intravenous Christy Pierce RN          furosemide (Lasix) 10 mg/mL injection 40 mg       Date Action Dose Route User    12/9/2024 0813 Given 40 mg Intravenous Christy Pierce RN          furosemide (Lasix) 500 mg in sodium chloride 0.9% 125 mL infusion       Date Action Dose Route User    12/9/2024 1050 New Bag 10 mg/hr Intravenous Christy Pierce RN          heparin (Porcine) 5000 UNIT/ML injection 5,000 Units       Date Action Dose Route User    12/10/2024 0539 Given 5,000 Units Subcutaneous (Left Lower Abdomen) Micaela Arnold RN    12/9/2024 2104 Given 5,000 Units Subcutaneous (Left Lower Abdomen) Micaela Arnold RN    12/9/2024 1347 Given 5,000 Units Subcutaneous (Left Upper Abdomen) Christy Pierce RN          hydrALAzine (Apresoline) 20 mg/mL injection 10 mg       Date Action Dose Route User    12/9/2024 2222 Given 10 mg Intravenous Micaela Arnold RN    12/9/2024 1120 Given 10 mg Intravenous Christy Pierce RN          hydrALAzine (Apresoline) 20 mg/mL injection 10 mg       Date Action Dose Route User    12/10/2024 0730 Given 10 mg Intravenous Micaela Arnold RN    12/10/2024 0049 Given 10 mg Intravenous Micaela Arnold RN          hydrALAZINE (Apresoline) tab 150 mg       Date Action Dose Route User    12/9/2024 1347 Given 150 mg Oral KariChristy montanez RN          HYDROcodone-acetaminophen (Norco) 5-325 MG per tab 2 tablet       Date Action Dose Route User     12/9/2024 1440 Given 2 tablet Oral KariChristy montaenz RN    12/9/2024 1051 Given 2 tablet Oral KariChristy montanez RN          isosorbide dinitrate (Isordil) tab 40 mg       Date Action Dose Route User    12/9/2024 1237 Given 40 mg Per NG Tube KariChristy montanez RN    12/9/2024 0812 Given 40 mg Per NG Tube KariChristy montanez RN          LORazepam (Ativan) tab 1 mg       Date Action Dose Route User    12/9/2024 1440 Given 1 mg Per G Tube KariChristy montanez RN    12/9/2024 0811 Given 1 mg Per G Tube KariChristy montanez RN          metoclopramide (Reglan) 5 mg/mL injection 5 mg       Date Action Dose Route User    12/9/2024 1440 Given 5 mg Intravenous KariChristy montanez RN          niCARdipine (carDENE) 125 mg in sodium chloride 0.9% 250 mL infusion       Date Action Dose Route User    12/10/2024 0527 New Bag 10 mg/hr Intravenous Micaela Arnold, CYNDIE    12/10/2024 0500 Rate/Dose Verify 10 mg/hr Intravenous Micaela Arnold, CYNDIE    12/10/2024 0200 Rate/Dose Change 10 mg/hr Intravenous Micaela Arnold, CYNDIE    12/10/2024 0000 Rate/Dose Verify 15 mg/hr Intravenous Micaela Arnold, RN    12/9/2024 2200 Rate/Dose Verify 15 mg/hr Intravenous Micaela Arnold RN    12/9/2024 2000 Rate/Dose Verify 15 mg/hr Intravenous Micaela Arnold RN    12/9/2024 1941 Rate/Dose Verify 15 mg/hr Intravenous KariChristy montanez RN    12/9/2024 1441 Rate/Dose Change 15 mg/hr Intravenous Christy Pierce RN    12/9/2024 1128 New Bag 10 mg/hr Intravenous Christy Pierce RN    12/9/2024 0930 Rate/Dose Verify 15 mg/hr Intravenous KariChristy montanez RN    12/9/2024 0806 Rate/Dose Verify 15 mg/hr Intravenous KariChristy montanez RN          polyethylene glycol (PEG 3350) (Miralax) 17 g oral packet 17 g       Date Action Dose Route User    12/9/2024 1051 Given 17 g Oral Christy Pierce RN          piperacillin-tazobactam (Zosyn) 3.375 g in dextrose 5% 100 mL IVPB-ADDV       Date Action Dose Route User    12/10/2024 0049 New Bag 3.375 g Intravenous Micaela Arnold, RN     12/9/2024 1850 New Bag 3.375 g Intravenous Christy Pierce RN          potassium chloride 20 mEq/100mL IVPB premix 20 mEq       Date Action Dose Route User    12/9/2024 1051 New Bag 20 mEq Intravenous Christy Pierce RN          potassium chloride 40 mEq/100mL IVPB premix (central line) 40 mEq       Date Action Dose Route User    12/9/2024 2309 New Bag 40 mEq Intravenous Micaela Arnold RN          propofol (Diprivan) 10 mg/mL infusion premix       Date Action Dose Route User    12/10/2024 0601 New Bag 50 mcg/kg/min × 117.1 kg (Dosing Weight) Intravenous Micaela Arnold RN    12/10/2024 0500 Rate/Dose Verify 50 mcg/kg/min × 117.1 kg (Dosing Weight) Intravenous Micaela Arnold RN    12/10/2024 0340 New Bag 50 mcg/kg/min × 117.1 kg (Dosing Weight) Intravenous Micaela Arnold RN    12/10/2024 0200 Rate/Dose Verify 50 mcg/kg/min × 117.1 kg (Dosing Weight) Intravenous Micaela Arnold RN    12/10/2024 0128 New Bag 50 mcg/kg/min × 117.1 kg (Dosing Weight) Intravenous Micaela Arnold RN    12/10/2024 0000 Rate/Dose Verify 50 mcg/kg/min × 117.1 kg (Dosing Weight) Intravenous Micaela Arnold RN    12/9/2024 2309 New Bag 50 mcg/kg/min × 117.1 kg (Dosing Weight) Intravenous Micaela Arnold RN    12/9/2024 2200 Rate/Dose Verify 50 mcg/kg/min × 117.1 kg (Dosing Weight) Intravenous Micaela Arnold RN    12/9/2024 2055 New Bag 50 mcg/kg/min × 117.1 kg (Dosing Weight) Micaela Long RN    12/9/2024 2000 Rate/Dose Change 50 mcg/kg/min × 117.1 kg (Dosing Weight) Micaela Long RN    12/9/2024 1941 Rate/Dose Verify 45 mcg/kg/min × 117.1 kg (Dosing Weight) Intravenous Christy Pierce RN    12/9/2024 1844 New Bag 45 mcg/kg/min × 117.1 kg (Dosing Weight) Intravenous Christy Pierce RN    12/9/2024 1620 Rate/Dose Change 40 mcg/kg/min × 117.1 kg (Dosing Weight) Intravenous Christy Pierce RN    12/9/2024 1548 New Bag 20 mcg/kg/min × 117.1 kg (Dosing Weight) Intravenous Christy Pierce RN     12/9/2024 1134 New Bag 20 mcg/kg/min × 117.1 kg (Dosing Weight) Intravenous Christy Pierce RN    12/9/2024 0930 Rate/Dose Verify 20 mcg/kg/min × 117.1 kg (Dosing Weight) Intravenous Christy Pierce RN    12/9/2024 0806 Rate/Dose Verify 20 mcg/kg/min × 117.1 kg (Dosing Weight) Intravenous Christy Pierce RN          QUEtiapine (SEROquel) tab 50 mg       Date Action Dose Route User    12/9/2024 0812 Given 50 mg Per G Tube Christy Pierce RN          senna (Senokot) 8.8 MG/5ML oral syrup 8.8 mg       Date Action Dose Route User    12/9/2024 0811 Given 8.8 mg Per G Tube Christy Pierce RN          vancomycin (Vancocin) 1.75 g in sodium chloride 0.9% 500mL IVPB premix       Date Action Dose Route User    12/9/2024 1941 New Bag 1,750 mg Intravenous Christy Pierce RN          carvedilol (Coreg) tab 37.5 mg       Date Action Dose Route User    12/9/2024 0812 Given 37.5 mg Oral Christy Pierce RN            Vitals (last day)       Date/Time Temp Pulse Resp BP SpO2 Weight O2 Device O2 Flow Rate (L/min) [facility]    12/09/24 1941 -- 95 23 144/127 100 % -- Ventilator --     12/09/24 1906 -- 76 19 116/59 100 % -- Ventilator --     12/09/24 1900 -- 77 19 116/59 100 % -- Ventilator --     12/09/24 1844 -- 81 20 -- 100 % -- Ventilator --     12/09/24 1800 98.2 °F (36.8 °C) 96 24 166/67 100 % -- Ventilator --     12/09/24 1700 -- 92 30 162/76 100 % -- Ventilator --     12/09/24 1300 -- 73 18 137/62 100 % -- Ventilator --     12/09/24 1200 -- 73 18 115/56 100 % -- Ventilator --     12/09/24 1134 97.7 °F (36.5 °C) 74 18 -- 100 % -- Ventilator --     12/09/24 0930 -- 84 30 -- 100 % -- Ventilator -- FG    12/09/24 0900 -- 77 19 122/58 100 % -- Ventilator --     12/09/24 0806 -- 84 14 126/55 100 % -- Ventilator --     12/09/24 0800 98.3 °F (36.8 °C) 85 20 126/55 100 % -- Ventilator --     12/09/24 0710 -- 75 23 -- 100 % -- -- --     12/09/24 0500 -- 73 18 132/60 100 % -- Ventilator --     12/09/24 0400 98.9  °F (37.2 °C) 73 16 122/54 100 % -- Ventilator --     12/09/24 0000 98.8 °F (37.1 °C) 72 18 121/57 100 % -- Ventilator --

## 2024-12-10 NOTE — DIETARY NOTE
ADULT NUTRITION REASSESSMENT    Pt is at high nutrition risk.  Pt does not meet malnutrition criteria.      RECOMMENDATIONS TO MD: See Nutrition Intervention for EN specifics . Trach and PEG placement. Resume TF per MD when route available.      ADMITTING DIAGNOSIS:  Dissection of ascending aorta (HCC)  PERTINENT PAST MEDICAL HISTORY:   Past Medical History:    Chronic kidney disease (CKD)    Esophageal reflux    High blood pressure    High cholesterol    HYPERTENSION    Hypertension    Unspecified essential hypertension     PATIENT STATUS: Initial 11/27/24: RD received consult to initiate tube feedings. Pt is POD #5 emergent aortic dissection and repair. Pt has been intubated, unable to extubate failing SBT's. NPO x 6 days today. Well-nourished. No weight loss, actually some gain since admission, likely r/t post op fluid changes. On Lasix. No pressors. Not at significant risk for refeeding syndrome.   1127:  -Attempted to discuss with CYNDIE Milan via phone call. RN states Propofol is current running at 45 mcg/kg/min (31.6ml/hr providing 835 kcal from lipids).   -RN states IV fluids currently running: D5/NaCl 0.45%. States current rate in chart is accurate (40 ml/hr). IVF status has not been updated in active meds nor in flowsheets since 11/25 @ 0600.   -RN states pt continues on insulin drip per protocol \"while pt is intubated.\"   -Sent secure chat to APN today to confirm if IVF/insulin drip will continue once tube feedings are started. Awaiting response.     Update 12/2/24: Pt cont to require full vent support. Failing SBTs. Will need trach and PEG this week per MD chart notes. Receiving high dose propofol  at 50mcg/kg/min. Blood sugars well-controlled. Non DM, off insulin gtt. Pt having thick secretions requiring significant amount of suction. Having BM's, last on 11/30. Now with new open keloid wound to right breast. Seen by wound care RN. Cont TF, tolerating at goal rate with modular protein added BID.      Update 12/5/24: Pt had new trach placed yesterday, doing well. Restraints off during the day. PEG placed this AM by Dr. Barrientos. Plans to resume Gtube feedings if safe after 24 hours following placement. Will adjust TF order as appropriate tomorrow in anticipation of resuming via Gtube. Monitor propofol titration/dc. On Reglan. May need to utilize renal formula until phos comes down WNL.     Update 12/10/24: TF was stopped yesterday. Per MD/RN chart notes, pt with high residuals, episode of emesis, and now fecal-like matter found around trach opening. Was cleaned and replaced by ENT. Pt has been having BM's, appropriate stool consistency for being tube fed. Had KUB done, no ileus or bowel obstruction, but large stool burden seen. On bowel regimen. Will avoid/limit narcotics. Continuing to hold TF for now. Monitor for nutrition plan.     FOOD/NUTRITION RELATED HISTORY:  Appetite: NPO  Intake: NPO  Intake Meeting Needs: NPO and TF on hold for now . TF + propofol was meeting needs at goal rate.   Percent Meals Eaten (last 6 days)       None            Food Allergies: No Known Food Allergies (NKFA)  Cultural/Ethnic/Lutheran Preferences: Not Obtained    GASTROINTESTINAL:  +BM 12/10 -after enema.    MEDICATIONS: reviewed Propofol running at 35.1 ml/hr providing 926 kcals from lipids x 24 hours    dexmedetomidine 1.5 mcg/kg/hr (12/10/24 1022)    niCARdipine 10 mg/hr (12/10/24 0527)    propofol 50 mcg/kg/min (12/10/24 1036)      [Held by provider] metoclopramide  5 mg Intravenous Q6H    [Held by provider] polyethylene glycol (PEG 3350)  17 g Oral BID    cloNIDine  0.3 mg Oral TID    piperacillin-tazobactam  3.375 g Intravenous Q8H    vancomycin  12.5 mg/kg Intravenous Q24H    LORazepam  1 mg Per G Tube TID    carvedilol  37.5 mg Oral BID with meals    [Held by provider] aspirin  81 mg Peg Tube Daily    QUEtiapine  50 mg Per G Tube BID    [Held by provider] senna  5 mL Per G Tube BID    [Held by provider] docusate  100 mg  Oral BID    isosorbide dinitrate  40 mg Per NG Tube TID (Nitrates)    hydrALAZINE  150 mg Oral Q8H GABRIEL    amLODIPine  10 mg Oral Daily    heparin  5,000 Units Subcutaneous Q8H GABRIEL    chlorhexidine gluconate  15 mL Mouth/Throat BID    famotidine  20 mg Intravenous Daily     LABS: reviewed  Recent Labs     12/07/24  0627 12/08/24  0637 12/09/24  0609 12/09/24  0720 12/09/24  1453 12/10/24  0607   * 158* 132*  --  110* 143*   BUN 36* 38*  --  37* 33* 40*   CREATSERUM 1.41* 1.48* 1.51*  --  1.53* 1.70*   CA 8.8 8.4* 9.1  --  9.2 8.4*   MG 2.5 2.2 2.5  --   --   --     141 143  --  141 142   K 3.8 5.1  5.1  --  3.9 3.9 4.5   * 116* 115*  --  112 115*   CO2 18.0* 18.0* 17.0*  --  28.0 17.0*   PHOS 4.1 4.2 4.2  --   --   --    OSMOCALC 309* 304*  --   --  300* 306*     NUTRITION RELATED PHYSICAL FINDINGS:  - Nutrition Focused Physical Exam (NFPE): well nourished  - Fluid Accumulation:  +1-3 edema to various areas of body/generalized  ---> See RN documentation for details  - Skin Integrity: surgical wound(s) and other wounds noted ---> See RN documentation for details    ANTHROPOMETRICS:  HT: 165.1 cm (5' 5\")  WT: 135 kg (297 lb 9.9 oz) - wt is up ~39# since admission despite diuresis. Fluid overloaded.   BMI: Body mass index is 49.53 kg/m².  BMI CLASSIFICATION: greater than 40 kg/m2 - morbid obesity class III  IBW: 115 lbs        246% IBW  Usual Body Wt: ~245 lbs in the past per EMR        115% UBW    WEIGHT HISTORY:  Patient Weight(s) for the past 336 hrs:   Weight   12/09/24 0540 135 kg (297 lb 9.9 oz)   12/08/24 1500 103.6 kg (228 lb 4.8 oz)   12/07/24 0400 131 kg (288 lb 12.8 oz)   12/06/24 0551 129.4 kg (285 lb 4.4 oz)   12/05/24 0426 128 kg (282 lb 3 oz)   12/04/24 0322 132 kg (291 lb 0.1 oz)   12/03/24 0300 129.5 kg (285 lb 7.9 oz)   12/01/24 0600 130.8 kg (288 lb 5.8 oz)   11/30/24 0300 121.8 kg (268 lb 8.3 oz)   11/29/24 0600 120.8 kg (266 lb 5.1 oz)   11/27/24 0600 129.1 kg (284 lb 9.8 oz)      Wt Readings from Last 10 Encounters:   12/09/24 135 kg (297 lb 9.9 oz)   10/24/24 115.2 kg (254 lb)   05/17/24 112.9 kg (249 lb)   02/22/24 112.9 kg (249 lb)   12/06/23 112.9 kg (249 lb)   10/09/23 112.5 kg (248 lb)   09/25/23 111.1 kg (245 lb)   08/10/23 111.7 kg (246 lb 4.1 oz)   08/09/23 111.7 kg (246 lb 4.8 oz)   03/25/23 109.3 kg (241 lb)     NUTRITION DIAGNOSIS/PROBLEM:   Inadequate oral intake related to Decreased ability to consume sufficient energy as evidenced by NPO status and no PO intake during admission, and intubation.    Progress:   No improvement, continue - TF on hold for intolerance.     NUTRITION INTERVENTION:     NUTRITION PRESCRIPTION:   Estimated Nutrition needs: --dosing wt of 117 kg - wt taken on 11/22 - likely close to dry weight.  Calories: 1598-1929 calories/day (15-18 calories per kg Dosing wt)  Bim State: 1911 kcal (recalculated on 12/5)  Protein: 62- 104g protein/day (1.2-2 g protein/kg Ideal body wt (IBW))  Fluid Needs: 1ml/kcal or 25ml/kg adjusted weight for obesity= 2075ml (83 kg adjusted)  Fluid overloaded at this time, edematous    - Diet:       Procedures    NPO       - RD Care Plan:  EN to resume when able? - MD  - Medical Food Supplements-RD added NPO. Rational/use of oral supplements discussed.  - Vitamin and mineral supplements: none  - Feeding assistance: NPO  - Nutrition education: not appropriate     - Coordination of nutrition care: collaboration with other providers  - Discharge and transfer of nutrition care to new setting or provider: monitor plans    MONITOR AND EVALUATE/NUTRITION GOALS:  - Food and Nutrient Intake:      Monitor: N/A  - Food and Nutrient Administration:      Monitor: tolerance to enteral nutrition, resumption of enteral nutrition, for enteral nutrition adjustment, and propofol rate  - Anthropometric Measurement:    Monitor weight  - Nutrition Goals:      allow wt loss due to fluid losses, long term gradual wt loss, tube feed meets greater than  80% of needs, labs within acceptable limits, and monitor fluid status    DIETITIAN FOLLOW UP: RD to follow and monitor nutrition status       Stefany Argueta RD, LDN  Clinical Dietitian  P: [phone]

## 2024-12-10 NOTE — PLAN OF CARE
Patient on moderate sedation; propofol, precedex drips and cardene drip at max dose (Is getting hydralazine 10mg prn I called Dr Montgomery and updated).  Easily gets restless;  coughs  alarms the vent prompt in line suction/oral suction and oral care done. Kept head of bed at 45 degrees, never desaturated and vitals have been stable.  Abdomen rounded/distended from 7 to 11pm 400ml of  stool from peg gravity bag and 11pm to this morning 800ml of stool from peg gravity bag. I tried doing an enema though did not tolerate and was alarming vent did have a bm loose brown medium same color like the peg drainage.   In line suction 300ml of tan/brown content at the start of shift after turning and changing the linen.  This am about 100ml from in line suction.  Continue to be careful and less stimulation/agitation.  Safety measures continued, chg bath done and aguilar care.   Problem: CARDIOVASCULAR - ADULT  Goal: Maintains optimal cardiac output and hemodynamic stability  Description: INTERVENTIONS:  - Monitor vital signs, rhythm, and trends  - Monitor for bleeding, hypotension and signs of decreased cardiac output  - Evaluate effectiveness of vasoactive medications to optimize hemodynamic stability  - Monitor arterial and/or venous puncture sites for bleeding and/or hematoma  - Assess quality of pulses, skin color and temperature  - Assess for signs of decreased coronary artery perfusion - ex. Angina  - Evaluate fluid balance, assess for edema, trend weights  Outcome: Not Progressing     Problem: RESPIRATORY - ADULT  Goal: Achieves optimal ventilation and oxygenation  Description: INTERVENTIONS:  - Assess for changes in respiratory status  - Assess for changes in mentation and behavior  - Position to facilitate oxygenation and minimize respiratory effort  - Oxygen supplementation based on oxygen saturation or ABGs  - Provide Smoking Cessation handout, if applicable  - Encourage broncho-pulmonary hygiene including cough, deep  breathe, Incentive Spirometry  - Assess the need for suctioning and perform as needed  - Assess and instruct to report SOB or any respiratory difficulty  - Respiratory Therapy support as indicated  - Manage/alleviate anxiety  - Monitor for signs/symptoms of CO2 retention  Outcome: Not Progressing     Problem: GASTROINTESTINAL - ADULT  Goal: Minimal or absence of nausea and vomiting  Description: INTERVENTIONS:  - Maintain adequate hydration with IV or PO as ordered and tolerated  - Nasogastric tube to low intermittent suction as ordered  - Evaluate effectiveness of ordered antiemetic medications  - Provide nonpharmacologic comfort measures as appropriate  - Advance diet as tolerated, if ordered  - Obtain nutritional consult as needed  - Evaluate fluid balance  Outcome: Not Progressing  Goal: Maintains or returns to baseline bowel function  Description: INTERVENTIONS:  - Assess bowel function  - Maintain adequate hydration with IV or PO as ordered and tolerated  - Evaluate effectiveness of GI medications  - Encourage mobilization and activity  - Obtain nutritional consult as needed  - Establish a toileting routine/schedule  - Consider collaborating with pharmacy to review patient's medication profile  Outcome: Not Progressing     Problem: METABOLIC/FLUID AND ELECTROLYTES - ADULT  Goal: Electrolytes maintained within normal limits  Description: INTERVENTIONS:  - Monitor labs and rhythm and assess patient for signs and symptoms of electrolyte imbalances  - Administer electrolyte replacement as ordered  - Monitor response to electrolyte replacements, including rhythm and repeat lab results as appropriate  - Fluid restriction as ordered  - Instruct patient on fluid and nutrition restrictions as appropriate  Outcome: Not Progressing  Goal: Hemodynamic stability and optimal renal function maintained  Description: INTERVENTIONS:  - Monitor labs and assess for signs and symptoms of volume excess or deficit  - Monitor intake,  output and patient weight  - Monitor urine specific gravity, serum osmolarity and serum sodium as indicated or ordered  - Monitor response to interventions for patient's volume status, including labs, urine output, blood pressure (other measures as available)  - Encourage oral intake as appropriate  - Instruct patient on fluid and nutrition restrictions as appropriate  Outcome: Not Progressing     Problem: SKIN/TISSUE INTEGRITY - ADULT  Goal: Incision(s), wounds(s) or drain site(s) healing without S/S of infection  Description: INTERVENTIONS:  - Assess and document risk factors for pressure ulcer development  - Assess and document skin integrity  - Assess and document dressing/incision, wound bed, drain sites and surrounding tissue  - Implement wound care per orders  - Initiate isolation precautions as appropriate  - Initiate Pressure Ulcer prevention bundle as indicated  Outcome: Not Progressing     Problem: NEUROLOGICAL - ADULT  Goal: Achieves stable or improved neurological status  Description: INTERVENTIONS  - Assess for and report changes in neurological status  - Initiate measures to prevent increased intracranial pressure  - Maintain blood pressure and fluid volume within ordered parameters to optimize cerebral perfusion and minimize risk of hemorrhage  - Monitor temperature, glucose, and sodium. Initiate appropriate interventions as ordered  Outcome: Not Progressing

## 2024-12-10 NOTE — PROGRESS NOTES
[facility]  part of [facility]    Progress Note    Alisha Wilde Patient Status:  Inpatient    10/25/1965 MRN [id number]   Location [facility] 2W/SW Attending Missy Paulson MD   Hosp Day # 18 PCP Bridger Avendano MD     Subjective:  Pt currently sedated w/Trach on full vent support. No visitors at bedside.     Objective:  /54 (BP Location: Left arm)   Pulse 75   Temp 99 °F (37.2 °C) (Axillary)   Resp 19   Ht 5' 5\" (1.651 m)   Wt 297 lb 9.9 oz (135 kg)   SpO2 100%   BMI 49.53 kg/m²     Temp (24hrs), Av.7 °F (37.1 °C), Min:97.7 °F (36.5 °C), Max:99.5 °F (37.5 °C)      Intake/Output:    Intake/Output Summary (Last 24 hours) at 12/10/2024 0853  Last data filed at 12/10/2024 0500  Gross per 24 hour   Intake 2855.34 ml   Output 4445 ml   Net -1589.66 ml       Wt Readings from Last 3 Encounters:   24 297 lb 9.9 oz (135 kg)   10/24/24 254 lb (115.2 kg)   24 249 lb (112.9 kg)       Allergies:  Allergies[1]    Labs:  Lab Results   Component Value Date    WBC 8.1 12/10/2024    HGB 8.7 12/10/2024    HCT 26.0 12/10/2024    .0 12/10/2024    CREATSERUM 1.70 12/10/2024    BUN 40 12/10/2024     12/10/2024    K 4.5 12/10/2024     12/10/2024    CO2 17.0 12/10/2024     12/10/2024    CA 8.4 12/10/2024       Physical Exam:  Blood pressure 127/54, pulse 75, temperature 99 °F (37.2 °C), temperature source Axillary, resp. rate 19, height 5' 5\" (1.651 m), weight 297 lb 9.9 oz (135 kg), SpO2 100%, not currently breastfeeding.  Neck: w/Trach in place  Lungs: Coarse bilaterally anteriorly/laterally   Heart: RRR, S1, S2  Abdomen: mildly distended/Obese, BS+x 4/PEG to gravity bag w/liquid brown stool noted. TF off. Daily enemas. + BM(s) post op-w/last BM   Extremities: Warm, dry, 1-2+ generalized UE & LE edema bilat  Pulses: 2+ bilat DP  Skin: sternotomy incision CATHIE=CDI-healing well  Neurological:  sedated    Assessment/Plan:  S/P EMERGENT AORTIC  DISSECTION REPAIR POD # 18  Respiratory Failure w/multiple failed SBT prior to trach placement -currently on full vent support-continue daily weaning trials as able. Re-intubated on 11/22 per Pulm after emesis episode/ETT removal due to emesis content noted in airway.    S/P Trach placement per ENT on 12/4  Completed ABX course for suspected aspiration pneumonia post op.  ABX re-started on 12/9 after emesis episode yesterday: concern for aspiration   CT Chest/Abdomen/Pelvis (non-contrast) 11/26=no obstruction noted/stable. Abdominal X-ray 12/4=Gas. +BM (s) post op however pt w/episodes of emesis yesterday w/stool content noted/+high PEG residuals. TF on hold. PEG placed to gravity bag w/thin brown residual liquid. 12/9 Abd X-ray= w/constipation & CT Abd/Pelvis=  w/o obstruction. Mgt per GI.  Hx: HTN: on multiple antihypertensives including IV Cardene-weaning as able: SBP goal= <130/MAP >70. Mgt per Cards. New A-line placed 12/2  New PICC placed 12/2  Pain meds as needed-avoid/limit narcotics w/recent constipation/emesis episodes  Scds/Heparin SubQ prophylaxis DVT prevention. HIPA 11/25=negative. Plts continue >100,000  Continue ICU status  Hx: CKD. Expected post op volume overload w/mgt per Nephrology (consulted on 11/23) for NAIMA. Limit/avoid nephrotoxic meds: Lasix per Nephrology. Aguilar remains for close I/O monitoring/immobility-new aguilar placed 12/9. Kidney US w/doppler on 12/8   Expected post op anemia: w/o clinical significance/stable. May be slightly diluted due to volume overload.   Hx: Morbid obesity w/BMI >40-plan for RD to see when appropriate  Nutrition per TF via PEG-currently on hold due to high PEG residuals. PEG placed 12/5 per GI.    Hx: ETOH abuse-monitor for withdrawals. CIWA protocol PRN   Pt on Ativan TID/Seroquel BID.  Discharge planning: pt lives alone and has supportive family.   Anticipate LTAC at discharge: referrals sent proactively by  on 11/29 for LTAC.       Plan of care  discussed w/RN and CV Surgeon: Dr. Marysol Noel, APRN  12/10/2024  8:53 AM         [1]   Allergies  Allergen Reactions    Atorvastatin MYALGIA    Pravastatin MYALGIA    Rosuvastatin MYALGIA    Seasonal Runny nose

## 2024-12-10 NOTE — CONSULTS
[facility]  Report of Consultation    Alisha Wilde Patient Status:  Inpatient    10/25/1965 MRN [id number]   Location [facility] 2W/SW Attending Missy Paulson MD   Hosp Day # 18 PCP Bridger Avendano MD     Reason for Consultation:  Possible small bowel obstruction    History of Present Illness:  Alisha Wilde is a 59 year old female who presents to [facility] on  with aortic dissection. She underwent emergent repair of her aortic dissection but hasn't been able to wean from her vent since the operation. Tracheostomy was performed  with ENT, EGD with PEG placement was performed on  by the GI team. She has been undergoing tube feeds since then, however has been having high residual tube feeds with worsening abdominal distention and feculent emesis. CT A/P on  revealed dilated loops of small bowel without clear transition point. Patient is sedated on exam today, unable to get any history.     Past medical history CKD, HTN, GERD    History:  Past Medical History:    Chronic kidney disease (CKD)    Esophageal reflux    High blood pressure    High cholesterol    HYPERTENSION    Hypertension    Unspecified essential hypertension     Past Surgical History:   Procedure Laterality Date    Colonoscopy N/A 2018    Procedure: COLONOSCOPY;  Surgeon: Magdy Webber MD;  Location: [facility] ENDOSCOPY    Endometrial ablation      child passed away for 5 mins          1    Other surgical history  11    cysto-Dr. Lacey -- pt denies     Family History   Problem Relation Age of Onset    Hypertension Mother     Heart Disorder Mother 70    Other (Other) Mother         kidney failure    Hypertension Father     Hypertension Maternal Grandfather     Cancer Neg     Diabetes Neg     Glaucoma Neg     Macular degeneration Neg       reports that she quit smoking about 25 years ago. Her smoking use included cigarettes. She started smoking about 38 years ago. She has a  13 pack-year smoking history. She has quit using smokeless tobacco. She reports current alcohol use of about 1.0 - 2.0 standard drink of alcohol per week. She reports that she does not use drugs.    Allergies:  Allergies[1]    Medications:  Medications Prior to Admission   Medication Sig    Acetaminophen ER (TYLENOL 8 HOUR) 650 MG Oral Tab CR Take 2 tablets (1,300 mg total) by mouth daily as needed.    Calcium Carb-Cholecalciferol 600-10 MG-MCG Oral Tab Take 1 tablet by mouth daily.    metoprolol succinate ER 50 MG Oral Tablet 24 Hr Take 1 tablet (50 mg total) by mouth daily.    Losartan Potassium-HCTZ 100-12.5 MG Oral Tab Take 1 tablet by mouth daily.    Potassium Chloride ER 20 MEQ Oral Tab CR Take 1 tablet by mouth daily.    albuterol 108 (90 Base) MCG/ACT Inhalation Aero Soln Inhale 2 puffs into the lungs every 4 (four) hours as needed for Wheezing.    amLODIPine 10 MG Oral Tab Take 1 tablet (10 mg total) by mouth daily.    Ascorbic Acid (VITAMIN C) 1000 MG Oral Tab Take 1 tablet (1,000 mg total) by mouth daily.    vitamin B-12 50 MCG Oral Tab Take 1 tablet (50 mcg total) by mouth daily.    fluticasone propionate 50 MCG/ACT Nasal Suspension 2 sprays by Nasal route daily.    fluticasone-salmeterol 250-50 MCG/ACT Inhalation Aerosol Powder, Breath Activated Inhale 1 puff into the lungs 2 (two) times daily. inhale 1 puff by INHALATION route 2 times every day morning and evening approximately 12 hours apart (Patient not taking: Reported on 11/21/2024)         Current Facility-Administered Medications:     dexmedeTOMIDine (Precedex) 800 mcg in sodium chloride 0.9% 100 mL infusion, 0.2-1.5 mcg/kg/hr (Dosing Weight), Intravenous, Continuous    furosemide (Lasix) 10 mg/mL injection 40 mg, 40 mg, Intravenous, Once    [Held by provider] metoclopramide (Reglan) 5 mg/mL injection 5 mg, 5 mg, Intravenous, Q6H    [Held by provider] polyethylene glycol (PEG 3350) (Miralax) 17 g oral packet 17 g, 17 g, Oral, BID    [COMPLETED]  cloNIDine (Catapres) tab 0.3 mg, 0.3 mg, Oral, QID **FOLLOWED BY** [COMPLETED] cloNIDine (Catapres) tab 0.3 mg, 0.3 mg, Oral, TID **FOLLOWED BY** cloNIDine (Catapres) tab 0.3 mg, 0.3 mg, Oral, TID    sodium chloride 3 % nebulizer solution 3 mL, 3 mL, Nebulization, PRN    albuterol (Ventolin) (2.5 MG/3ML) 0.083% nebulizer solution 2.5 mg, 2.5 mg, Nebulization, Q6H PRN    piperacillin-tazobactam (Zosyn) 3.375 g in dextrose 5% 100 mL IVPB-ADDV, 3.375 g, Intravenous, Q8H    vancomycin (Vancocin) 1.75 g in sodium chloride 0.9% 500mL IVPB premix, 12.5 mg/kg, Intravenous, Q24H    hydrALAzine (Apresoline) 20 mg/mL injection 10 mg, 10 mg, Intravenous, Q4H PRN    LORazepam (Ativan) tab 1 mg, 1 mg, Per G Tube, TID    carvedilol (Coreg) tab 37.5 mg, 37.5 mg, Oral, BID with meals    acetaminophen (Tylenol) 160 MG/5ML oral liquid 650 mg, 650 mg, Oral, Q6H PRN    [Held by provider] aspirin chewable tab 81 mg, 81 mg, Peg Tube, Daily    QUEtiapine (SEROquel) tab 50 mg, 50 mg, Per G Tube, BID    niCARdipine (carDENE) 125 mg in sodium chloride 0.9% 250 mL infusion, 5-15 mg/hr, Intravenous, Continuous    [Held by provider] senna (Senokot) 8.8 MG/5ML oral syrup 8.8 mg, 5 mL, Per G Tube, BID    [Held by provider] docusate (Colace) 50 MG/5ML oral solution 100 mg, 100 mg, Oral, BID    isosorbide dinitrate (Isordil) tab 40 mg, 40 mg, Per NG Tube, TID (Nitrates)    hydrALAZINE (Apresoline) tab 150 mg, 150 mg, Oral, Q8H GABRIEL    amLODIPine (Norvasc) tab 10 mg, 10 mg, Oral, Daily    ipratropium-albuterol (Duoneb) 0.5-2.5 (3) MG/3ML inhalation solution 3 mL, 3 mL, Nebulization, Q6H PRN    HYDROcodone-acetaminophen (Norco) 5-325 MG per tab 2 tablet, 2 tablet, Oral, Q4H PRN    heparin (Porcine) 5000 UNIT/ML injection 5,000 Units, 5,000 Units, Subcutaneous, Q8H GABRIEL    melatonin tab 3 mg, 3 mg, Oral, Nightly PRN    bisacodyl (Dulcolax) 10 MG rectal suppository 10 mg, 10 mg, Rectal, Daily PRN    potassium chloride 20 mEq/100mL IVPB premix 20 mEq, 20  mEq, Intravenous, PRN **OR** potassium chloride 40 mEq/100mL IVPB premix (central line) 40 mEq, 40 mEq, Intravenous, PRN    magnesium sulfate in dextrose 5% 1 g/100mL infusion premix 1 g, 1 g, Intravenous, PRN    magnesium sulfate in sterile water for injection 2 g/50mL IVPB premix 2 g, 2 g, Intravenous, PRN    chlorhexidine gluconate (Peridex) 0.12 % oral solution 15 mL, 15 mL, Mouth/Throat, BID    propofol (Diprivan) 10 mg/mL infusion premix, 5-50 mcg/kg/min (Dosing Weight), Intravenous, Continuous    [DISCONTINUED] famotidine (Pepcid) tab 20 mg, 20 mg, Oral, Daily **OR** famotidine (Pepcid) 20 mg/2mL injection 20 mg, 20 mg, Intravenous, Daily    Review of Systems:  Review of Systems   Unable to perform ROS      Physical Exam:  /57 (BP Location: Left arm)   Pulse 79   Temp 98.7 °F (37.1 °C) (Axillary)   Resp 24   Ht 5' 5\" (1.651 m)   Wt 297 lb 9.9 oz (135 kg)   SpO2 100%   BMI 49.53 kg/m²   Physical Exam  Vitals reviewed.   Constitutional:       General: She is not in acute distress.     Comments: Trached and sedated   HENT:      Head: Normocephalic and atraumatic.      Right Ear: External ear normal.      Left Ear: External ear normal.      Nose: Nose normal.   Cardiovascular:      Rate and Rhythm: Normal rate.   Pulmonary:      Effort: No respiratory distress.   Abdominal:      General: There is distension.      Palpations: Abdomen is soft.      Tenderness: There is no abdominal tenderness. There is no guarding or rebound.         Laboratory Data:  Recent Labs   Lab 12/07/24  0627 12/08/24  0637 12/09/24  0609 12/10/24  0607   RBC 3.00* 3.84 3.18* 3.05*   HGB 8.4* 10.9* 9.0* 8.7*   HCT 26.2* 33.5* 27.7* 26.0*   MCV 87.3 87.2 87.1 85.2   MCH 28.0 28.4 28.3 28.5   MCHC 32.1 32.5 32.5 33.5   RDW 17.2* 17.9* 17.3* 17.3*   NEPRELIM 5.57 6.94 7.06  --    WBC 8.5 9.9 10.2 8.1   .0 270.0 165.0 313.0       Recent Labs   Lab 12/05/24  0416 12/06/24  0534 12/07/24  0627 12/08/24  0637 12/09/24  0609  12/09/24  0720 12/09/24  1453 12/10/24  0607   *   < > 113* 158* 132*  --  110* 143*   BUN 41*   < > 36* 38*  --  37* 33* 40*   CREATSERUM 1.61*   < > 1.41* 1.48* 1.51*  --  1.53* 1.70*   CA 9.1   < > 8.8 8.4* 9.1  --  9.2 8.4*   ALB 3.6   < > 3.3 3.8 3.7  --   --   --       < > 145 141 143  --  141 142   K 4.0   < > 3.8 5.1  5.1  --  3.9 3.9 4.5   *   < > 117* 116* 115*  --  112 115*   CO2 17.0*   < > 18.0* 18.0* 17.0*  --  28.0 17.0*   ALKPHO 86  --   --   --  101  --   --   --    AST 31  --   --   --   --  24  --   --    ALT 33  --   --   --  28 25  --   --    BILT 0.4  --   --   --  0.3  --   --   --    TP 6.3  --   --   --  6.7  --   --   --     < > = values in this interval not displayed.         Recent Labs   Lab 12/04/24  0510   PTP 15.6*   INR 1.17   PTT 30.7         CT ABDOMEN+PELVIS(CPT=74176)    Result Date: 12/9/2024  CONCLUSION:  1. There are dilated and fluid-filled loops of small bowel without clear transition point to suggest mechanical bowel obstruction. Differential diagnostic considerations include underlying infectious/inflammatory enteritis, malabsorption, or early, developing bowel obstruction.   2. Extensive uncomplicated distal colonic diverticulosis.  3. The urinary bladder remains distended despite placement of a Webb catheter.  4. Hepatomegaly with underlying hepatic steatosis.  5. Mild anasarca.  6. Partially visualized postthoracotomy chest demonstrating small pleural effusions and associated basilar atelectasis, with or without superimposed pneumonia.  7. Additional branching opacities are evident and may reflect small airways disease.   8. Low-density appearance of the intracardiac blood pool raises the possibility of underlying anemia. Correlate with hematologic parameters.  9. Lesser incidental findings as above.    Dictated by (CST): Eulalio Shaikh MD on 12/09/2024 at 6:19 PM     Finalized by (CST): Eulalio Shaikh MD on 12/09/2024 at 6:31 PM           Medical  Decision Making         Impression:  Patient Active Problem List   Diagnosis    Hypercholesteremia    Alcoholism (HCC)    Essential hypertension    Vitamin D deficiency    Adjustment disorder with mixed anxiety and depressed mood    Controlled type 2 diabetes mellitus without complication, without long-term current use of insulin (HCC)    Obesity (BMI 30-39.9)    Neck mass    Polyp of colon    Flat feet, bilateral    Hypokalemia    Myalgia due to statin    Age-related nuclear cataract of both eyes    Positive for microalbuminuria    Dissection of ascending aorta (HCC)    NAIMA (acute kidney injury) (HCC)    Stage 3 chronic kidney disease (HCC)    Acute respiratory failure with hypoxia (HCC)    Hypervolemia     Aortic dissection s/p emergent repair on 11/21  Small bowel dilation     Plan:  No acute surgical intervention indicated. Patient is a poor surgical candidate  Ileus is thought to be more likely than evolving small bowel obstruction. Continue PEG to LIS  Agree with holding TF per GI  May need NGT if continues to have emesis  Vent weaning per pulmonology  Medical management per CCU team  Dr. Lilly to see and provide additional recommendations as needed    Panchito Guzman PA-C  12/10/2024  3:12 PM      The patient was independently examined. Agree with the PA's assessment and plan. PEG in good position on CT, no obvious source of obstruction. Continue conservative management.      Chloe Lilly MD  [facility] - General Surgery   [address] [place] [address]  p [phone]          [1]   Allergies  Allergen Reactions    Atorvastatin MYALGIA    Pravastatin MYALGIA    Rosuvastatin MYALGIA    Seasonal Runny nose

## 2024-12-10 NOTE — CM/SW NOTE
12/10/24 1700   Discharge Needs   Anticipated D/C needs LTACH   Choice of Post-Acute Provider   Informed patient of right to choose their preferred provider Yes   List of appropriate post-acute services provided to patient/family with quality data Yes   Patient/family choice [facility]   Information given to Other (comment)  (siblings)     DIMPLE spoke to Paris from [facility].  Sister is to check on insurance to see when it terms.  Per sister, patient will not be able to afford premiums on own.  Will need medicaid pending to pursue ongoing care needs.    HARSH from [facility] requested to speak with sister to obtain financial information to see if Alisha qualifies.      DIMPLE spoke with sister and choice for LTAC is [facility].  CM sent updates in [facility] for [facility] to review.    PLAN:  [facility] for LTAC pending both financial clearance from [facility] and medical clearance by Bryn.    Zohra Melendez MBA BSN RN CRRN   RN Case Manager  [phone]

## 2024-12-11 ENCOUNTER — APPOINTMENT (OUTPATIENT)
Dept: GENERAL RADIOLOGY | Facility: HOSPITAL | Age: 59
End: 2024-12-11
Attending: INTERNAL MEDICINE
Payer: COMMERCIAL

## 2024-12-11 ENCOUNTER — APPOINTMENT (OUTPATIENT)
Dept: GENERAL RADIOLOGY | Facility: HOSPITAL | Age: 59
DRG: 003 | End: 2024-12-11
Attending: INTERNAL MEDICINE
Payer: COMMERCIAL

## 2024-12-11 LAB
ANION GAP SERPL CALC-SCNC: 13 MMOL/L (ref 0–18)
BUN BLD-MCNC: 41 MG/DL (ref 9–23)
CALCIUM BLD-MCNC: 8.4 MG/DL (ref 8.7–10.4)
CHLORIDE SERPL-SCNC: 113 MMOL/L (ref 98–112)
CO2 SERPL-SCNC: 17 MMOL/L (ref 21–32)
CREAT BLD-MCNC: 1.85 MG/DL
DEPRECATED RDW RBC AUTO: 53.7 FL (ref 35.1–46.3)
EGFRCR SERPLBLD CKD-EPI 2021: 31 ML/MIN/1.73M2 (ref 60–?)
ERYTHROCYTE [DISTWIDTH] IN BLOOD BY AUTOMATED COUNT: 17.4 % (ref 11–15)
GLUCOSE BLD-MCNC: 121 MG/DL (ref 70–99)
GLUCOSE BLDC GLUCOMTR-MCNC: 118 MG/DL (ref 70–99)
GLUCOSE BLDC GLUCOMTR-MCNC: 120 MG/DL (ref 70–99)
GLUCOSE BLDC GLUCOMTR-MCNC: 121 MG/DL (ref 70–99)
GLUCOSE BLDC GLUCOMTR-MCNC: 124 MG/DL (ref 70–99)
HCT VFR BLD AUTO: 25.4 %
HGB BLD-MCNC: 8.2 G/DL
MCH RBC QN AUTO: 27.9 PG (ref 26–34)
MCHC RBC AUTO-ENTMCNC: 32.3 G/DL (ref 31–37)
MCV RBC AUTO: 86.4 FL
PLATELET # BLD AUTO: 369 10(3)UL (ref 150–450)
POTASSIUM SERPL-SCNC: 3.4 MMOL/L (ref 3.5–5.1)
RBC # BLD AUTO: 2.94 X10(6)UL
SODIUM SERPL-SCNC: 143 MMOL/L (ref 136–145)
WBC # BLD AUTO: 6.4 X10(3) UL (ref 4–11)

## 2024-12-11 PROCEDURE — 99233 SBSQ HOSP IP/OBS HIGH 50: CPT | Performed by: FAMILY MEDICINE

## 2024-12-11 PROCEDURE — 74018 RADEX ABDOMEN 1 VIEW: CPT | Performed by: INTERNAL MEDICINE

## 2024-12-11 PROCEDURE — 99233 SBSQ HOSP IP/OBS HIGH 50: CPT | Performed by: INTERNAL MEDICINE

## 2024-12-11 PROCEDURE — 99291 CRITICAL CARE FIRST HOUR: CPT | Performed by: INTERNAL MEDICINE

## 2024-12-11 RX ORDER — BISACODYL 10 MG
10 SUPPOSITORY, RECTAL RECTAL
Status: DISCONTINUED | OUTPATIENT
Start: 2024-12-11 | End: 2024-12-20

## 2024-12-11 NOTE — PROGRESS NOTES
Pulmonary/ICU/Critical Care Progress Note        Reason for Consultation: post op care  Referring Physician: Dr. Gregg    Seen this am.     SUBJECTIVE:  Afebrile  CT overnight showed showed bladder distention. Had aguilar changed and had 1600 ml uo  BP has been better   PEG tube is to LIS  Low grade fever  Off nicardipine for now and off propofol.      ALLERGIES:  Allergies[1]        MEDS:  Home Medications:  Medications Taking[2]    Scheduled Medication:   cloNIDine  1 patch Transdermal Weekly    metoprolol  5 mg Intravenous Q6H    [Held by provider] metoclopramide  5 mg Intravenous Q6H    [Held by provider] polyethylene glycol (PEG 3350)  17 g Oral BID    piperacillin-tazobactam  3.375 g Intravenous Q8H    vancomycin  12.5 mg/kg Intravenous Q24H    LORazepam  1 mg Per G Tube TID    [Held by provider] carvedilol  37.5 mg Oral BID with meals    [Held by provider] aspirin  81 mg Peg Tube Daily    QUEtiapine  50 mg Per G Tube BID    [Held by provider] senna  5 mL Per G Tube BID    [Held by provider] docusate  100 mg Oral BID    isosorbide dinitrate  40 mg Per NG Tube TID (Nitrates)    hydrALAZINE  150 mg Oral Q8H GABRIEL    amLODIPine  10 mg Oral Daily    heparin  5,000 Units Subcutaneous Q8H GABRIEL    chlorhexidine gluconate  15 mL Mouth/Throat BID    famotidine  20 mg Intravenous Daily     Continuous Infusing Medication:   dexmedetomidine 1.5 mcg/kg/hr (12/11/24 0010)    niCARdipine Stopped (12/11/24 0452)    propofol 50 mcg/kg/min (12/11/24 0646)     PRN Medications:    acetaminophen    sodium chloride    albuterol    hydrALAzine    acetaminophen    ipratropium-albuterol    HYDROcodone-acetaminophen    melatonin    bisacodyl    potassium chloride **OR** potassium chloride    magnesium sulfate in dextrose 5%    magnesium sulfate in sterile water for injection       PHYSICAL EXAM:  /66 (BP Location: Left arm)   Pulse 73   Temp 99.5 °F (37.5 °C) (Oral)   Resp 24   Ht 5' 5\" (1.651 m)   Wt 287 lb 14.7 oz (130.6 kg)    SpO2 100%   BMI 47.91 kg/m²   Vent Mode: PRVC/AC  FiO2 (%):  [30 %] 30 %  S RR:  [18] 18  S VT:  [550 mL] 550 mL  PEEP/CPAP (cm H2O):  [5 cm H20] 5 cm H20  MAP (cm H2O):  [14-17] 16  CONSTITUTIONAL: intubated, sedated but opens eyes  HEENT: atraumatic normocephalic  MOUTH: MMM, large tongue  NECK/THROAT: no JVD. Trachea midline. No obvious thyromegaly. + trach with min bleeding   LUNG: vented upper clear BS b/l no wheezing, + crackles. Diminished at bases. Chest symmetric with respiratory motion. + midsternal surgical wound healing   HEART: regular rate and rhythm, no obvious murmers or gallops noted  ABD: soft distended and tender. + hypoactive bowel sounds. No organomegaly noted. + PEG tube   EXT: no clubbing, cyanosis, or edema noted. Pulses intact grossly  NEURO/MUSCULOSKELETAL: intubated sedated but opens eyes   SKIN: warm, dry. No obvious lesions noted  LYMPH: no obvious LAD      IMAGES:   CT A/P 12/10/24  CONCLUSION:   1. Fluid-filled loops of small bowel, which could reflect underlying infectious/inflammatory enteritis or malabsorption in the appropriate clinical setting. Bowel loops are mildly dilated without clear transition point to suggest mechanical bowel   obstruction, although early or partial bowel obstruction could have a similar appearance.   2. Extensive distal colonic diverticulosis without CT evidence of acute complication.    3. Bladder distension despite Webb catheter placement.   4. Hepatomegaly with hepatic steatosis.   5. Diffuse anasarca.   6. Partially visualized postthoracotomy chest with possible postoperative seroma/hematoma of the anterior chest wall.   7. Bilateral pleural effusions and associated basilar atelectasis, with or without superimposed pneumonia.   8. Additional scattered patchy nodular opacities may reflect infectious process.    9. Low-density appearance of the intracardiac blood pool raises the possibility of underlying anemia. Correlate with hematologic parameters.    10. Lesser incidental findings as above.     CXR 12/9/24  CONCLUSION:   1. Cardiomegaly.  Atherosclerotic aneurysmal thoracic aorta   2. Tracheostomy tube overlies tracheal air column.   3.  Bilateral mixed alveolar and interstitial multifocal airspace opacification has progressed.        KUB 12/9/24  CONCLUSION:   No huang dilatation of the small bowel to suggest small bowel obstruction.   Large cecal stool burden which may indicate constipation.  Mild stool burden throughout the remainder of the colon.     CXR 12/5/24  No significant change when compared to 12/04/2024.     CXR 12/4/24  CONCLUSION:   1. Mild cardiomegaly and minimally prominent pulmonary vascularity but no huang pulmonary edema.  ET tube and NG tubes have been removed.  Tracheostomy is now noted in the trachea extending to the level the clavicles.  No pneumothorax.   2. Persistent patchy bilateral upper lobe airspace disease right more than left suggesting persistent bilateral upper lobe pneumonia.  Correlate clinically and continued follow-up is advised.     CXR 12/2/24  FINDINGS:   POSITION: The patient is semi-erect and slightly rotated to the right.   DEVICES: There is an endotracheal tube terminating approximately 3.9 cm above the jennyfer.  A large-bore right internal jugular central venous vascular access sheath has tip projecting in the SVC. An enteric tube extends caudally beyond the field of view.   CARDIAC/VASC: The cardiomediastinal silhouette is accentuated by AP technique, but likely stably enlarged. There is mild tortuosity of the thoracic aorta with peripheral atherosclerotic vascular calcification. The pulmonary vascularity is within normal   limits.   MEDIAST/HAMLET: Surgical clips are present.   LUNGS/PLEURA: Elevation right hemidiaphragm is noted. Multifocal patchy airspace consolidation is demonstrated, slightly worse on the right side than left. Mild interstitial opacities are seen. Additional scattered reticular opacities may  be atelectatic   in nature. No large pleural effusion or pneumothorax is evident.    BONES: Median sternotomy wires are demonstrated. Mild degenerative changes of the thoracic spine are apparent. There is no fracture or visible bony lesion.   OTHER: There may be surgical clips at the level of the thoracic inlet. Leads overlie the chest and obscure underlying detail     CXR 11/27/24  Moderate pulmonary edema, progressed since 11/26/2024.     CT c/a/p  CONCLUSION:  Low-lying ETT, 0.8 cm above the jennyfer   Multifocal bilateral pulmonary nodular consolidation s    CXR 11/24/24  FINDINGS:   CARDIAC/VASC: The cardiac silhouette is exaggerated by AP portable technique. There is stable central pulmonary venous congestion.   MEDIAST/HAMLET:   No visible mass or adenopathy.   LUNGS/PLEURA: There are stable streaky opacities at the right lung base.  No pleural effusion or pneumothorax.   BONES: Sternotomy changes are again noted.   OTHER: An endotracheal tube tip projects 3.8 cm above the jennyfer.  An enteric tube again courses into the left upper quadrant.  A right internal jugular approach McDonough-Dev catheter tip again projects over the pulmonary outflow tract.  A mediastinal drain tip again projects over the right suprahilar region.     CXR 11/23/24  On my read possible RML infiltrates  CONCLUSION: Post emergent acute type A aortic dissection repair.  Stable cardiomegaly.  Gross stable/satisfactory position of lines and tubes.  Mild pulmonary vascular congestion.       CXR 11/22/24  CONCLUSION: Post feeding tube placement with tip in the distal esophagus approximately 4 cm above the gastroesophageal junction.  Recommend advancement of the tube by approximately 10 cm to place it in the stomach.     CXR 11/22/24  CARDIAC/MEDIASTINUM: The cardiac silhouette is enlarged and unchanged.. There is a right internal jugular McDonough-Dev catheter with tip at the level of the main pulmonary artery. There is a right-sided chest tube.  Endotracheal tube with tip approximately    5.3 cm above the jennyfer. There is a nasogastric tube with tip and sidehole within the stomach and below the diaphragm. There are median sternotomy wires and postoperative changes of ascending aortic repair.   LUNGS: There is pulmonary vascular redistribution without edema. Minimal blunting of the bilateral costophrenic angles may be secondary to trace pleural effusions versus chronic pleural thickening. There is mild bibasilar atelectasis. No pneumothorax.   BONES: There is degenerative disease of the thoracic spine.     Chest CTA 11/22/24  CONCLUSION:   1. Type a aortic dissection beginning just above right renal (correction:  Right coronary)  artery and extending distally into the left common iliac artery and external iliac.  Findings were called to Dr. Echavarria at 5:52 p.m.       LABS:  Recent Labs   Lab 12/07/24  0627 12/08/24  0637 12/09/24  0609 12/10/24  0607 12/11/24  0547   RBC 3.00* 3.84 3.18* 3.05* 2.94*   HGB 8.4* 10.9* 9.0* 8.7* 8.2*   HCT 26.2* 33.5* 27.7* 26.0* 25.4*   MCV 87.3 87.2 87.1 85.2 86.4   MCH 28.0 28.4 28.3 28.5 27.9   MCHC 32.1 32.5 32.5 33.5 32.3   RDW 17.2* 17.9* 17.3* 17.3* 17.4*   NEPRELIM 5.57 6.94 7.06  --   --    WBC 8.5 9.9 10.2 8.1 6.4   .0 270.0 165.0 313.0 369.0       Recent Labs   Lab 12/05/24  0416 12/06/24  0534 12/07/24  0627 12/08/24  0637 12/09/24  0609 12/09/24  0720 12/09/24  1453 12/10/24  0607 12/11/24  0547   *   < > 113* 158* 132*  --  110* 143* 121*   BUN 41*   < > 36* 38*  --  37* 33* 40*  --    CREATSERUM 1.61*   < > 1.41* 1.48* 1.51*  --  1.53* 1.70* 1.85*   EGFRCR 36*   < > 42* 40* 39*  --  38* 34* 31*   CA 9.1   < > 8.8 8.4* 9.1  --  9.2 8.4* 8.4*   ALB 3.6   < > 3.3 3.8 3.7  --   --   --   --       < > 145 141 143  --  141 142 143   K 4.0   < > 3.8 5.1  5.1  --  3.9 3.9 4.5  --    *   < > 117* 116* 115*  --  112 115* 113*   CO2 17.0*   < > 18.0* 18.0* 17.0*  --  28.0 17.0* 17.0*   ALKPHO 86   --   --   --  101  --   --   --   --    AST 31  --   --   --   --  24  --   --   --    ALT 33  --   --   --  28 25  --   --   --    BILT 0.4  --   --   --  0.3  --   --   --   --    TP 6.3  --   --   --  6.7  --   --   --   --     < > = values in this interval not displayed.       ASSESSMENT/PLAN:  Type A aortic dissection s/p repair now intubated in ICU  -S/P nicardipine gtt  now off temporarily per CV surgery  -on multiple oral antiHTN meds for BP control but holding until can use PEG tube  -s/p pRBC transfusion on 12/4 with repeat hg stable    Post op acute respiratory failure  -complicated by extubation and reimergent intubation and significant emesis concerning for aspiration PNA vs pneumonitis  -started on zosyn-completed 10 day course  -checked ETT asp-candida likely contaminant  -failed multiple SBTs d/t increased RR, work of breathing, hypertension, hypoxemia, significant thick secretions  -hx of likely TANYA and previous smoking hx. Has sign dense consolidations and atelectasis on chest CT.  -s/p trach by ENT on 12/4/24 and PEG by GI on 12/5/24  -start trach/vent weaning as able  -PRN ABG and CXR  -saline nebs  -on propofol and precedex-having difficulty weaning as pt becomes agitated and hypertensive. Started seroquel 50 mg BID but holding since can't use PEG tube  Continue scheduled clonidine in attempt to wean off precedex  -restarted on vanco/zosyn for aspiration PNA. Continue for now. Check ETT asp    Previous hx of smoking and ETOH  -continue duonebs PRN    NAIMA on CKD and metabolic acidosis  -likely from surgery, and acute issues  -monitor UO and renal labs  -renal following   -s/p aguilar change with 1600 ml UO overnight    Abd pain  -s/p abd CT as above  -resolved  -not tolerating TF now  -PEG tube to LIS  -GI and surgery consulted    Proph:  -DVT: hep sq    Dispo:  -full code  -SW/ to assist with LTAC placement  -spoke with CV surgery service    Critical care time 30 min independent of  procedures      Thank you for the opportunity to care for Alisha Wilde.      SIDDHARTH Rainey DO, MPH  Pulmonary Critical Care Medicine  [facility]                         [1]   Allergies  Allergen Reactions    Atorvastatin MYALGIA    Pravastatin MYALGIA    Rosuvastatin MYALGIA    Seasonal Runny nose   [2]   Outpatient Medications Marked as Taking for the 11/21/24 encounter (Hospital Encounter)   Medication Sig Dispense Refill    Acetaminophen ER (TYLENOL 8 HOUR) 650 MG Oral Tab CR Take 2 tablets (1,300 mg total) by mouth daily as needed.      Calcium Carb-Cholecalciferol 600-10 MG-MCG Oral Tab Take 1 tablet by mouth daily.      metoprolol succinate ER 50 MG Oral Tablet 24 Hr Take 1 tablet (50 mg total) by mouth daily. 90 tablet 3    Losartan Potassium-HCTZ 100-12.5 MG Oral Tab Take 1 tablet by mouth daily. 90 tablet 3    Potassium Chloride ER 20 MEQ Oral Tab CR Take 1 tablet by mouth daily. 90 tablet 3    albuterol 108 (90 Base) MCG/ACT Inhalation Aero Soln Inhale 2 puffs into the lungs every 4 (four) hours as needed for Wheezing. 3 each 3    amLODIPine 10 MG Oral Tab Take 1 tablet (10 mg total) by mouth daily. 90 tablet 3    Ascorbic Acid (VITAMIN C) 1000 MG Oral Tab Take 1 tablet (1,000 mg total) by mouth daily.      vitamin B-12 50 MCG Oral Tab Take 1 tablet (50 mcg total) by mouth daily.

## 2024-12-11 NOTE — PROGRESS NOTES
Progress Note     Alisha Wilde Patient Status:  Inpatient    10/25/1965 MRN [id number]   Location [facility] 2W/SW Attending Radha Tidwell MD   Hosp Day # 19 PCP Bridger Avendano MD     Chief Complaint: patient presented with   Chief Complaint   Patient presents with    Chest Pain Angina       Subjective:   S: : trach, plan for possible NG tube by GI , PEG with stool therefore to LIS, weaning sedation     Review of Systems:   10 point ROS completed and was negative, except for pertinent positive and negatives stated in subjective.    Objective:   Vital signs:  Temp:  [98.7 °F (37.1 °C)-100.9 °F (38.3 °C)] 99.5 °F (37.5 °C)  Pulse:  [73-96] 73  Resp:  [19-27] 24  BP: (125-164)/(57-72) 157/66  SpO2:  [100 %] 100 %  FiO2 (%):  [30 %] 30 %    Wt Readings from Last 6 Encounters:   24 287 lb 14.7 oz (130.6 kg)   10/24/24 254 lb (115.2 kg)   24 249 lb (112.9 kg)   24 249 lb (112.9 kg)   23 249 lb (112.9 kg)   10/09/23 248 lb (112.5 kg)         Physical Exam:    General: No acute distress. , Tracheostomy in place,     , morbidly obese  Respiratory: Clear to auscultation bilaterally. No wheezes. No rhonchi.  Cardiovascular: S1, S2. Regular rate and rhythm. No murmurs, rubs or gallops.   Abdomen: Soft, nontender, nondistended.  Positive bowel sounds. No rebound or guarding.  Neurologic: No focal neurological deficits.   Musculoskeletal: Moves all extremities.  Extremities: No edema.    Results:   Diagnostic Data:      Labs:    Labs Last 24 Hours:   BMP     CBC    Other     Na 143 Cl 113 BUN 41 Glu 121   Hb 8.2   PTT - Procal -   K 3.4 CO2 17.0 Cr 1.85   WBC 6.4 >< .0  INR - CRP -   Renal Lytes Endo    Hct 25.4   Trop - D dim -   eGFR - Ca 8.4 POC Gluc  121    LFT   pBNP - Lactic -   eGFR AA - PO4 - A1c -   AST - APk - Prot -  LDL -     Mg - TSH -   ALT - T miranda - Alb -        COVID-19 Lab Results    COVID-19  Lab Results   Component Value Date    COVID19 Not Detected 2024     COVID19 Not Detected 08/10/2023    COVID19 Not Detected 05/13/2022       Pro-Calcitonin  No results for input(s): \"PCT\" in the last 168 hours.    Cardiac  No results for input(s): \"TROP\", \"PBNP\" in the last 168 hours.    Creatinine Kinase  No results for input(s): \"CK\" in the last 168 hours.    Inflammatory Markers  No results for input(s): \"CRP\", \"NATALIA\", \"LDH\", \"DDIMER\" in the last 168 hours.    Imaging: Imaging data reviewed in Epic.    IMAGES:   CT A/P 12/10/24  CONCLUSION:   1. Fluid-filled loops of small bowel, which could reflect underlying infectious/inflammatory enteritis or malabsorption in the appropriate clinical setting. Bowel loops are mildly dilated without clear transition point to suggest mechanical bowel   obstruction, although early or partial bowel obstruction could have a similar appearance.   2. Extensive distal colonic diverticulosis without CT evidence of acute complication.    3. Bladder distension despite Webb catheter placement.   4. Hepatomegaly with hepatic steatosis.   5. Diffuse anasarca.   6. Partially visualized postthoracotomy chest with possible postoperative seroma/hematoma of the anterior chest wall.   7. Bilateral pleural effusions and associated basilar atelectasis, with or without superimposed pneumonia.   8. Additional scattered patchy nodular opacities may reflect infectious process.    9. Low-density appearance of the intracardiac blood pool raises the possibility of underlying anemia. Correlate with hematologic parameters.   10. Lesser incidental findings as above.      CXR 12/9/24  CONCLUSION:   1. Cardiomegaly.  Atherosclerotic aneurysmal thoracic aorta   2. Tracheostomy tube overlies tracheal air column.   3.  Bilateral mixed alveolar and interstitial multifocal airspace opacification has progressed.        KUB 12/9/24  CONCLUSION:   No huang dilatation of the small bowel to suggest small bowel obstruction.   Large cecal stool burden which may indicate constipation.   Mild stool burden throughout the remainder of the colon.      CXR 12/5/24  No significant change when compared to 12/04/2024.      CXR 12/4/24  CONCLUSION:   1. Mild cardiomegaly and minimally prominent pulmonary vascularity but no huang pulmonary edema.  ET tube and NG tubes have been removed.  Tracheostomy is now noted in the trachea extending to the level the clavicles.  No pneumothorax.   2. Persistent patchy bilateral upper lobe airspace disease right more than left suggesting persistent bilateral upper lobe pneumonia.  Correlate clinically and continued follow-up is advised.      CXR 12/2/24  FINDINGS:   POSITION: The patient is semi-erect and slightly rotated to the right.   DEVICES: There is an endotracheal tube terminating approximately 3.9 cm above the jennyfer.  A large-bore right internal jugular central venous vascular access sheath has tip projecting in the SVC. An enteric tube extends caudally beyond the field of view.   CARDIAC/VASC: The cardiomediastinal silhouette is accentuated by AP technique, but likely stably enlarged. There is mild tortuosity of the thoracic aorta with peripheral atherosclerotic vascular calcification. The pulmonary vascularity is within normal   limits.   MEDIAST/HAMLET: Surgical clips are present.   LUNGS/PLEURA: Elevation right hemidiaphragm is noted. Multifocal patchy airspace consolidation is demonstrated, slightly worse on the right side than left. Mild interstitial opacities are seen. Additional scattered reticular opacities may be atelectatic   in nature. No large pleural effusion or pneumothorax is evident.    BONES: Median sternotomy wires are demonstrated. Mild degenerative changes of the thoracic spine are apparent. There is no fracture or visible bony lesion.   OTHER: There may be surgical clips at the level of the thoracic inlet. Leads overlie the chest and obscure underlying detail      CXR 11/27/24  Moderate pulmonary edema, progressed since 11/26/2024.      CT  c/a/p  CONCLUSION:  Low-lying ETT, 0.8 cm above the jennyfer   Multifocal bilateral pulmonary nodular consolidation s     CXR 11/24/24  FINDINGS:   CARDIAC/VASC: The cardiac silhouette is exaggerated by AP portable technique. There is stable central pulmonary venous congestion.   MEDIAST/HAMLET:   No visible mass or adenopathy.   LUNGS/PLEURA: There are stable streaky opacities at the right lung base.  No pleural effusion or pneumothorax.   BONES: Sternotomy changes are again noted.   OTHER: An endotracheal tube tip projects 3.8 cm above the jennyfer.  An enteric tube again courses into the left upper quadrant.  A right internal jugular approach [place]-Dev catheter tip again projects over the pulmonary outflow tract.  A mediastinal drain tip again projects over the right suprahilar region.      CXR 11/23/24  On my read possible RML infiltrates  CONCLUSION: Post emergent acute type A aortic dissection repair.  Stable cardiomegaly.  Gross stable/satisfactory position of lines and tubes.  Mild pulmonary vascular congestion.       CXR 11/22/24  CONCLUSION: Post feeding tube placement with tip in the distal esophagus approximately 4 cm above the gastroesophageal junction.  Recommend advancement of the tube by approximately 10 cm to place it in the stomach.      CXR 11/22/24  CARDIAC/MEDIASTINUM: The cardiac silhouette is enlarged and unchanged.. There is a right internal jugular [place]-Dev catheter with tip at the level of the main pulmonary artery. There is a right-sided chest tube. Endotracheal tube with tip approximately    5.3 cm above the jennyfer. There is a nasogastric tube with tip and sidehole within the stomach and below the diaphragm. There are median sternotomy wires and postoperative changes of ascending aortic repair.   LUNGS: There is pulmonary vascular redistribution without edema. Minimal blunting of the bilateral costophrenic angles may be secondary to trace pleural effusions versus chronic pleural thickening.  There is mild bibasilar atelectasis. No pneumothorax.   BONES: There is degenerative disease of the thoracic spine.      Chest CTA 11/22/24  CONCLUSION:   1. Type a aortic dissection beginning just above right renal (correction:  Right coronary)  artery and extending distally into the left common iliac artery and external iliac.  Findings were called to Dr. Echavarria at 5:52 p.m.    Medications:    potassium chloride  40 mEq Intravenous Once    cloNIDine  1 patch Transdermal Weekly    metoprolol  5 mg Intravenous Q6H    [Held by provider] metoclopramide  5 mg Intravenous Q6H    [Held by provider] polyethylene glycol (PEG 3350)  17 g Oral BID    piperacillin-tazobactam  3.375 g Intravenous Q8H    vancomycin  12.5 mg/kg Intravenous Q24H    LORazepam  1 mg Per G Tube TID    [Held by provider] carvedilol  37.5 mg Oral BID with meals    [Held by provider] aspirin  81 mg Peg Tube Daily    QUEtiapine  50 mg Per G Tube BID    [Held by provider] senna  5 mL Per G Tube BID    [Held by provider] docusate  100 mg Oral BID    isosorbide dinitrate  40 mg Per NG Tube TID (Nitrates)    hydrALAZINE  150 mg Oral Q8H GABRIEL    amLODIPine  10 mg Oral Daily    heparin  5,000 Units Subcutaneous Q8H GABRIEL    chlorhexidine gluconate  15 mL Mouth/Throat BID    famotidine  20 mg Intravenous Daily       Assessment & Plan:   ASSESSMENT / PLAN:     Problem List Items Addressed This Visit          HCC Problems    Dissection of ascending aorta (HCC) - Primary    Relevant Medications    sodium chloride 0.9% infusion 1,000 mL (Completed)    prothrombin complex conc (human) (Kcentra) 1,563 Units in 60 mL infusion (Completed)    furosemide (Lasix) 10 mg/mL injection 20 mg (Completed)    heparin (Porcine) 5000 UNIT/ML injection 5,000 Units    amLODIPine (Norvasc) tab 10 mg    amLODIPine (Norvasc) tab 5 mg (Completed)    sodium chloride 0.9% infusion (Completed)    hydrALAZINE (Apresoline) tab 50 mg (Completed)    furosemide (Lasix) 10 mg/mL injection 40 mg  (Completed)    labetalol (Trandate) 5 mg/mL injection (Completed)    hydrALAZINE (Apresoline) tab 150 mg    isosorbide dinitrate (Isordil) tab 40 mg    sodium chloride 0.9% infusion (Completed)    furosemide (Lasix) 10 mg/mL injection 40 mg (Completed)    niCARdipine (carDENE) 125 mg in sodium chloride 0.9% 250 mL infusion    furosemide (Lasix) 10 mg/mL injection 40 mg (Completed)    cloNIDine (Catapres) tab 0.3 mg (Completed)    cloNIDine (Catapres) tab 0.3 mg (Completed)    aspirin chewable tab 81 mg    carvedilol (Coreg) tab 37.5 mg    hydrALAzine (Apresoline) 20 mg/mL injection 10 mg    dexmedeTOMIDine (Precedex) 800 mcg in sodium chloride 0.9% 100 mL infusion    furosemide (Lasix) 10 mg/mL injection 40 mg (Completed)    cloNIDine (Catapres) 0.3 MG/24HR patch 1 patch    metoprolol (Lopressor) 5 mg/5mL injection 5 mg     Other Visit Diagnoses       Aortic anomaly (HCC)        Relevant Medications    atropine 0.1 MG/ML injection 1 mg (Completed)    magnesium sulfate in dextrose 5% 1 g/100mL infusion premix 1 g    magnesium sulfate in sterile water for injection 2 g/50mL IVPB premix 2 g    furosemide (Lasix) 10 mg/mL injection 20 mg (Completed)    heparin (Porcine) 5000 UNIT/ML injection 5,000 Units    amLODIPine (Norvasc) tab 10 mg    amLODIPine (Norvasc) tab 5 mg (Completed)    hydrALAZINE (Apresoline) tab 50 mg (Completed)    furosemide (Lasix) 10 mg/mL injection 40 mg (Completed)    labetalol (Trandate) 5 mg/mL injection (Completed)    hydrALAZINE (Apresoline) tab 150 mg    lidocaine PF (Xylocaine-MPF) 1% injection (Completed)    isosorbide dinitrate (Isordil) tab 40 mg    furosemide (Lasix) 10 mg/mL injection 40 mg (Completed)    niCARdipine (carDENE) 125 mg in sodium chloride 0.9% 250 mL infusion    ceFAZolin (Ancef) 3 g in sodium chloride 0.9% 100mL IVPB premix (Completed)    furosemide (Lasix) 10 mg/mL injection 40 mg (Completed)    cloNIDine (Catapres) tab 0.3 mg (Completed)    cloNIDine (Catapres) tab 0.3  mg (Completed)    aspirin chewable tab 81 mg    carvedilol (Coreg) tab 37.5 mg    vancomycin (Vancocin) 1.75 g in sodium chloride 0.9% 500mL IVPB premix    hydrALAzine (Apresoline) 20 mg/mL injection 10 mg    furosemide (Lasix) 10 mg/mL injection 40 mg (Completed)    lidocaine (cardiac) (Xylocaine) 20 mg/mL injection (Completed)    atropine 0.1 MG/ML injection (Completed)    EPINEPHrine (Adrenalin) 1 MG/10ML (0.1 MG/ML) injection (Cardiac Arrest) (Completed)    cloNIDine (Catapres) 0.3 MG/24HR patch 1 patch    metoprolol (Lopressor) 5 mg/5mL injection 5 mg    Other Relevant Orders    Specimen to Pathology Tissue (Completed)          Type A aortic dissection   Ileus   S/p emergent repair 11/22/24   CT chest abdomen and pelvis shows bilateral pulmonary nodular consolidation- no focal fluid collection- completed IV antibiotics- continue to monitor off of them   On IV Reglan- montior QT interval   Antihypertensives being slowly added now that PEG is in place-tube feeds held due to secretions and bowel burden  Continue bowel regimen- miralax bid, tap water enemas- GI on board   Unfortunately stool is now coming out of the peg tube, trach collar and suctioning  Will order another CT scan to see if there is possible obstruction  Will most likely need to place NG tube- discussed with surgery and nursing   Add scopolamine patch   New picc 12/2  CV surgery, cards and critical care on consult.   TTE LVEF 75-80%.   Cont BP control per Cards  Daily spontaneous breathing trials   Plan is to eventually discharge to LTAC  PEG tube to low intermittent suction per GI- TFs stopped     Acute hypoxic respiratory failure   Aspiration event   Completed 10 days of zosyn.   Sputum cx candida   Failed SBT multiple times. Plans for trach tomorrow. PEG 12/5   ENT and GI on consult.  Pulmonary on consult.   CXR with multifocal airspace dz  Albuterol nebs   Status post trach placement 12/4  Status post PEG placement  12/5- resume tube feeds    Seroquel started to help with agitation 50 mg BID  On precedex and propofol- unsucessful weaning due to agitation and thick secretions   H/o tobacco and ETOH abuse   Duonebs PRN   CIWA protocol  NAIMA on CKD III   Renal on consult.   [place] to be secondary to MARY LOU/ATN   Now on lasix drip to 10 mg/hour   Doppler renal ultrasound unremarkable for renal artery stenosis  If negative Renal will add minoxidil    Essential HTN   Coreg 25 mg BID, norvasc 10mg daily, hydralazine 150mg TID. Clonidinie patch 0.2mg TID   Add hydralazine combination with isosobide 150-40 mg TID   IV lasix given- had 1 liter of urine output - now changed to lasix drip   Webb in place   ARB on hold due to NAIMA.   Iron def anemia  IV iron.   Iron level low   Transfuse for Hb < 7.0   Other medical problems  Morbid Obesity   TANYA     Quality:  DVT Prophylaxis: Heparin   CODE status: FULL   DISPO: pending clinical improvement.   Estimated date of discharge: To be determined  Discharge is dependent on: Improved clinical status  At this point Patient is expected to be discharge to: Home versus rehab      Plan of care discussed with Patient and RN.     Coordinated care with providers and counseling re: treatment plan and workup     Radha Tidwell MD    Supplementary Documentation:          I personally reviewed the available laboratories, imaging including operative report. I discussed/will discuss the case with patient and her nurse. I ordered laboratories studies. I adjusted medications including not applicable today. Medical decision making high, risk is high.     >55min spent, >50% spent counseling and coordinating care in the form of educating pt/family and d/w consultants and staff. Most of the time spent discussing the above plan.

## 2024-12-11 NOTE — PLAN OF CARE
Problem: Patient Centered Care  Goal: Patient preferences are identified and integrated in the patient's plan of care  Description: Interventions:  - What would you like us to know as we care for you? I work at [employer] and I am still very good friends with people from grade school! My brother, Osbaldo Prince, has been making decisions for me.  - Provide timely, complete, and accurate information to patient/family  - Incorporate patient and family knowledge, values, beliefs, and cultural backgrounds into the planning and delivery of care  - Encourage patient/family to participate in care and decision-making at the level they choose  - Honor patient and family perspectives and choices  Outcome: Progressing     Problem: Diabetes/Glucose Control  Goal: Glucose maintained within prescribed range  Description: INTERVENTIONS:  - Monitor Blood Glucose as ordered  - Assess for signs and symptoms of hyperglycemia and hypoglycemia  - Administer ordered medications to maintain glucose within target range  - Assess barriers to adequate nutritional intake and initiate nutrition consult as needed  - Instruct patient on self management of diabetes  Outcome: Progressing     Problem: Patient/Family Goals  Goal: Patient/Family Long Term Goal  Description: Patient's Long Term Goal: To discharge from the hospital safely    Interventions:  - surgical interventions, rounding, medications  - See additional Care Plan goals for specific interventions  Outcome: Progressing  Goal: Patient/Family Short Term Goal  Description: Patient's Short Term Goal: to breathe better    Interventions:   - manage the ventilator for pt until she is able to breath on her own.  - See additional Care Plan goals for specific interventions  Outcome: Progressing    Open eyes not following commands. On sedation. Trach to vent. Still brown output coming out from nose and trach. Peg to LIS minimal output. Webb was clogged; exchanged. Good UO.   Cardene gtt  weaned off. Frequent oral care. Changing positions. Will cont to monitor.

## 2024-12-11 NOTE — RESPIRATORY THERAPY NOTE
Received Pt on full support:     12/11/24 0250   Vent Information   Vent Mode PRVC/AC   Settings   FiO2 (%) 30 %   Resp Rate (Set) 18   Vt (Set, mL) 550 mL   Waveform Decelerating ramp   PEEP/CPAP (cm H2O) 5 cm H20      Pt is chronic vent pt with portex 8 trach tube. Trach was switched yesterday by ENT. Trach care was done several times due to black fluid drainage from trach area and nose. Trach ties were changed. No changes were made overnight. RT to continue monitor.

## 2024-12-11 NOTE — PROGRESS NOTES
[facility]  part of [facility]    Cardiology Progress Note    Alisha Wilde Patient Status:  Inpatient    10/25/1965 MRN [id number]   Location [facility] 2W/SW Attending Aguila Villegas MD   Hosp Day # 19 PCP Bridger Avendano MD     Interval History   No significant events overnight        Assessment and Plan:   Acute hypoxic respiratory failure  Suspected ileus, associated with fecal matter aspiration and emesis  Type A aortic dissection  NAIMA on CKD-III, improved  Obesity  EtOH abuse  -presented with acute onset CP   -CT with type A aortic dissection above right coronary artery and extending distally into the left common iliac artery and external iliac   -s/p emergent successful repair on 24  -had aspiration event following self-extubation, re-intubated with multiple failed SBT; now s/p trach/PEG tube with aspiration event with stool   -now on broad spectrum Abx and NPO again    Hypertension  -TTE on  with hyperdynamic LVEF  -continue BP management - substantial component of agitation worsening BP   -now NPO after aspiration event   -holding coreg from 37.5 mg, clonidine  0.3 mg 3 times daily, combination hydralazine + isosorbide 150mg-40mg TID, amlodipine 10 mg daily   -continue nicardipine gtt   -start IV metoprolol 5mg q6hrs -> can transition to labetalol drip if BP remains uncontrolled   -Continue clonidine patch  -monitor rhythm on tele    Objective:   Patient Vitals for the past 24 hrs:   BP Temp Temp src Pulse Resp SpO2 Weight   24 1500 119/66 -- -- 70 18 92 % --   24 1400 124/66 -- -- 71 18 95 % --   24 1300 122/66 -- -- 68 18 97 % --   24 1200 124/68 98.4 °F (36.9 °C) Axillary 68 18 97 % --   24 1100 127/75 -- -- 69 18 98 % --   24 1000 129/71 -- -- 69 18 97 % --   24 0900 128/69 -- -- 70 18 97 % --   24 0800 (!) 143/118 97.7 °F (36.5 °C) Temporal 70 18 98 % --   24 0700 130/70 -- --  18 99 % --    11/26/24 0621 -- -- -- -- -- -- 282 lb 3 oz (128 kg)   11/26/24 0600 95/53 -- -- 68 21 97 % --   11/26/24 0500 121/67 -- -- 70 18 97 % --   11/26/24 0400 119/68 98.4 °F (36.9 °C) Temporal 72 18 98 % --   11/26/24 0200 109/60 -- -- 72 21 96 % --   11/26/24 0130 -- -- -- 72 18 97 % --   11/26/24 0100 142/73 -- -- 71 18 98 % --   11/26/24 0030 -- -- -- 71 18 99 % --   11/26/24 0000 139/70 98.3 °F (36.8 °C) Temporal 70 18 98 % --   11/25/24 2300 130/69 -- -- 72 18 98 % --   11/25/24 2200 135/70 -- -- 71 18 98 % --   11/25/24 2100 138/71 -- -- 71 18 99 % --   11/25/24 2000 132/68 98.1 °F (36.7 °C) Temporal 69 18 99 % --   11/25/24 1900 124/64 -- -- 71 18 98 % --   11/25/24 1800 136/72 -- -- 71 18 98 % --   11/25/24 1700 138/70 -- -- 70 18 98 % --       Intake/Output:   Last 3 shifts:   Intake/Output                   11/24/24 0700 - 11/25/24 0659 11/25/24 0700 - 11/26/24 0659 11/26/24 0700 - 11/27/24 0659       Intake    P.O.  0  0  --    P.O. 0 0 --    I.V.  2276.7  1884.9  --    I.V. 154 615 --    Volume (mL) Insulin 53.9 37 --    Volume (mL) Nitroglycerin 236.9 54.5 --    Volume (mL) Dobutamine 4.5 -- --    Volume (mL) Propofol 477.8 713.9 --    Volume (mL) Dexmedetomidine 352.2 384.5 --    Volume (mL) (dextrose 5%-sodium chloride 0.45% infusion) 997.4 80 --    NG/GT  50  150  60    Intake (mL) (NG/OG Tube Orogastric 16 Fr. Right mouth) 50 150 60    IV PIGGYBACK  600  500  --    Volume (mL) (piperacillin-tazobactam (Zosyn) 3.375 g in dextrose 5% 100 mL IVPB-ADDV) 300 200 --    Volume (mL) (acetaminophen (Ofirmev) 10 mg/mL infusion premix 1,000 mg) 200 100 --    Volume (mL) (potassium chloride 20 mEq/100mL IVPB premix 20 mEq) -- 100 --    Volume (mL) (potassium chloride 40 mEq/100mL IVPB premix (central line) 40 mEq) 100 100 --    Total Intake 2926.7 2534.9 60       Output    Urine  1800  3570  800    Output (mL) (Urethral Catheter Latex;Temperature probe;Double-lumen) 1800 3570 800    Emesis/NG output  550  365  --     Residual volume (ml) (NG/OG Tube Orogastric 16 Fr. Right mouth) -- 5 --    Output (mL) (NG/OG Tube Orogastric 16 Fr. Right mouth) 550 360 --    Chest Tube  188  125  30    Output (mL) ([REMOVED] Chest Tube Anterior Mediastinal 32 Fr.) 48 50 --    Output (mL) (Chest Tube Anterior Pericardial 24 Fr.) 140 75 30    Total Output 2538 4060 830       Net I/O     388.7 -1525.1 -770             Vent Settings: Vent Mode: PRVC/AC  FiO2 (%):  [30 %] 30 %  S RR:  [18] 18  S VT:  [550 mL] 550 mL  PEEP/CPAP (cm H2O):  [5 cm H20] 5 cm H20  MAP (cm H2O):  [14-17] 15    Hemodynamic parameters (last 24 hours):      Scheduled Meds:    potassium chloride  40 mEq Intravenous Once    cloNIDine  1 patch Transdermal Weekly    metoprolol  5 mg Intravenous Q6H    [Held by provider] metoclopramide  5 mg Intravenous Q6H    [Held by provider] polyethylene glycol (PEG 3350)  17 g Oral BID    piperacillin-tazobactam  3.375 g Intravenous Q8H    vancomycin  12.5 mg/kg Intravenous Q24H    LORazepam  1 mg Per G Tube TID    [Held by provider] carvedilol  37.5 mg Oral BID with meals    [Held by provider] aspirin  81 mg Peg Tube Daily    QUEtiapine  50 mg Per G Tube BID    [Held by provider] senna  5 mL Per G Tube BID    [Held by provider] docusate  100 mg Oral BID    isosorbide dinitrate  40 mg Per NG Tube TID (Nitrates)    hydrALAZINE  150 mg Oral Q8H GABRIEL    amLODIPine  10 mg Oral Daily    heparin  5,000 Units Subcutaneous Q8H [facility]    chlorhexidine gluconate  15 mL Mouth/Throat BID    famotidine  20 mg Intravenous Daily       Continuous Infusions:    dexmedetomidine 1.5 mcg/kg/hr (12/11/24 0010)    niCARdipine 15 mg/hr (12/11/24 1009)    propofol 40 mcg/kg/min (12/11/24 1051)       Results:     Lab Results   Component Value Date    WBC 6.4 12/11/2024    HGB 8.2 (L) 12/11/2024    HCT 25.4 (L) 12/11/2024    .0 12/11/2024    CREATSERUM 1.85 (H) 12/11/2024    BUN 41 (H) 12/11/2024     12/11/2024    K 3.4 (L) 12/11/2024     (H)  12/11/2024    CO2 17.0 (L) 12/11/2024     (H) 12/11/2024    CA 8.4 (L) 12/11/2024    ALB 3.7 12/09/2024    ALKPHO 101 12/09/2024    BILT 0.3 12/09/2024    TP 6.7 12/09/2024    AST 24 12/09/2024    ALT 25 12/09/2024    PTT 30.7 12/04/2024    INR 1.17 12/04/2024    TSH 2.085 12/07/2024    NATHALIE 99 11/25/2024    LIP 28 11/25/2024    DDIMER 0.42 12/08/2019    ESRML 15 06/05/2021    MG 2.5 12/09/2024    PHOS 4.2 12/09/2024    TROP <0.045 12/08/2019     (H) 09/23/2021    B12 1,156 (H) 07/23/2018       Recent Labs   Lab 12/09/24  1453 12/10/24  0607 12/11/24  0547 12/11/24  0759   * 143* 121*  --    BUN 33* 40*  --  41*   CREATSERUM 1.53* 1.70* 1.85*  --    CA 9.2 8.4* 8.4*  --     142 143  --    K 3.9 4.5  --  3.4*    115* 113*  --    CO2 28.0 17.0* 17.0*  --      Recent Labs   Lab 12/07/24  0627 12/08/24  0637 12/09/24  0609 12/10/24  0607 12/11/24  0547   RBC 3.00* 3.84 3.18* 3.05* 2.94*   HGB 8.4* 10.9* 9.0* 8.7* 8.2*   HCT 26.2* 33.5* 27.7* 26.0* 25.4*   MCV 87.3 87.2 87.1 85.2 86.4   MCH 28.0 28.4 28.3 28.5 27.9   MCHC 32.1 32.5 32.5 33.5 32.3   RDW 17.2* 17.9* 17.3* 17.3* 17.4*   NEPRELIM 5.57 6.94 7.06  --   --    WBC 8.5 9.9 10.2 8.1 6.4   .0 270.0 165.0 313.0 369.0       No results for input(s): \"BNPML\" in the last 168 hours.    No results for input(s): \"TROP\", \"CK\" in the last 168 hours.    CT ABDOMEN+PELVIS(CPT=74176)    Result Date: 12/10/2024  CONCLUSION:  1. Fluid-filled loops of small bowel, which could reflect underlying infectious/inflammatory enteritis or malabsorption in the appropriate clinical setting. Bowel loops are mildly dilated without clear transition point to suggest mechanical bowel obstruction, although early or partial bowel obstruction could have a similar appearance.  2. Extensive distal colonic diverticulosis without CT evidence of acute complication.   3. Bladder distension despite Webb catheter placement.  4. Hepatomegaly with hepatic steatosis.  5.  Diffuse anasarca.  6. Partially visualized postthoracotomy chest with possible postoperative seroma/hematoma of the anterior chest wall.  7. Bilateral pleural effusions and associated basilar atelectasis, with or without superimposed pneumonia.  8. Additional scattered patchy nodular opacities may reflect infectious process.   9. Low-density appearance of the intracardiac blood pool raises the possibility of underlying anemia. Correlate with hematologic parameters.  10. Lesser incidental findings as above.    Dictated by (CST): Eulalio Shaikh MD on 12/10/2024 at 6:12 PM     Finalized by (CST): Eulalio Shaikh MD on 12/10/2024 at 6:21 PM          CT ABDOMEN+PELVIS(CPT=74176)    Result Date: 12/9/2024  CONCLUSION:  1. There are dilated and fluid-filled loops of small bowel without clear transition point to suggest mechanical bowel obstruction. Differential diagnostic considerations include underlying infectious/inflammatory enteritis, malabsorption, or early, developing bowel obstruction.   2. Extensive uncomplicated distal colonic diverticulosis.  3. The urinary bladder remains distended despite placement of a Webb catheter.  4. Hepatomegaly with underlying hepatic steatosis.  5. Mild anasarca.  6. Partially visualized postthoracotomy chest demonstrating small pleural effusions and associated basilar atelectasis, with or without superimposed pneumonia.  7. Additional branching opacities are evident and may reflect small airways disease.   8. Low-density appearance of the intracardiac blood pool raises the possibility of underlying anemia. Correlate with hematologic parameters.  9. Lesser incidental findings as above.    Dictated by (CST): Eulalio Shaikh MD on 12/09/2024 at 6:19 PM     Finalized by (CST): Eulalio Shaikh MD on 12/09/2024 at 6:31 PM          XR ABDOMEN (1 VIEW) (CPT=74018)    Result Date: 12/9/2024  CONCLUSION:   No huang dilatation of the small bowel to suggest small bowel obstruction.  Large cecal  stool burden which may indicate constipation.  Mild stool burden throughout the remainder of the colon.      Dictated by (CST): Sudarshan Everett MD on 12/09/2024 at 8:42 AM     Finalized by (CST): Sudarshan Everett MD on 12/09/2024 at 8:44 AM          XR CHEST AP PORTABLE  (CPT=71045)    Result Date: 12/9/2024  CONCLUSION:  1. Cardiomegaly.  Atherosclerotic aneurysmal thoracic aorta 2. Tracheostomy tube overlies tracheal air column. 3.  Bilateral mixed alveolar and interstitial multifocal airspace opacification has progressed.    Dictated by (CST): Woodrow Fischer MD on 12/09/2024 at 8:10 AM     Finalized by (CST): Woodrow Fischer MD on 12/09/2024 at 8:14 AM                    Exam:     Physical Exam:  General: awake/alert  HEENT: +trach  Neck: No JVD, carotids 2+, no bruits.  Cardiac: Regular rate and rhythm. S1, S2 normal.   Lungs: +rhonchi   Abdomen: Soft, non-tender.BS-present.  Extremities: Without clubbing or cyanosis. + BLE edema.    Neurologic: unable to obtain  Skin: Warm and dry.     Edward Montgomery, [facility] [place]

## 2024-12-11 NOTE — PROGRESS NOTES
Pt received trach to vent,suctioned as needed,RT will continue to monitor.   12/10/24 2027   Vent Information   Vent Mode PRVC/AC   Settings   FiO2 (%) 30 %   Resp Rate (Set) 18   Vt (Set, mL) 550 mL   Waveform Decelerating ramp   PEEP/CPAP (cm H2O) 5 cm H20

## 2024-12-11 NOTE — PROGRESS NOTES
[facility]  part of [facility]    Cardiology Progress Note    Alisha Wilde Patient Status:  Inpatient    10/25/1965 MRN [id number]   Location [facility] 2W/SW Attending Aguila Villegas MD   Hosp Day # 18 PCP Bridger Avendano MD     Interval History   Aspiration event with stool likely    Assessment and Plan:   Acute hypoxic respiratory failure, c/b aspiration event requiring re-intubation  Type A aortic dissection  NAIMA on CKD-III, improved  Obesity  EtOH abuse  -presented with acute onset CP   -CT with type A aortic dissection above right coronary artery and extending distally into the left common iliac artery and external iliac   -s/p emergent successful repair on 24  -had aspiration event following self-extubation, re-intubated with multiple failed SBT; now s/p trach/PEG tube with aspiration event with stool   -now on broad spectrum Abx and NPO again    Hypertension  -TTE on  with hyperdynamic LVEF  -continue BP management - substantial component of agitation worsening BP   -now NPO after aspiration event   -holding coreg from 37.5 mg, clonidine  0.3 mg 3 times daily, combination hydralazine + isosorbide 150mg-40mg TID, amlodipine 10 mg daily   -continue nicardipine gtt   -start IV metoprolol 5mg q6hrs   -start clonidine patch  -monitor rhythm on tele    Objective:   Patient Vitals for the past 24 hrs:   BP Temp Temp src Pulse Resp SpO2 Weight   24 1500 119/66 -- -- 70 18 92 % --   24 1400 124/66 -- -- 71 18 95 % --   24 1300 122/66 -- -- 68 18 97 % --   24 1200 124/68 98.4 °F (36.9 °C) Axillary 68 18 97 % --   24 1100 127/75 -- -- 69 18 98 % --   24 1000 129/71 -- -- 69 18 97 % --   24 0900 128/69 -- -- 70 18 97 % --   24 0800 (!) 143/118 97.7 °F (36.5 °C) Temporal 70 18 98 % --   24 0700 130/70 -- -- 69 18 99 % --   11/26/24 0621 -- -- -- -- -- -- 282 lb 3 oz (128 kg)   24 95/53 -- --  21 97 % --    11/26/24 0500 121/67 -- -- 70 18 97 % --   11/26/24 0400 119/68 98.4 °F (36.9 °C) Temporal 72 18 98 % --   11/26/24 0200 109/60 -- -- 72 21 96 % --   11/26/24 0130 -- -- -- 72 18 97 % --   11/26/24 0100 142/73 -- -- 71 18 98 % --   11/26/24 0030 -- -- -- 71 18 99 % --   11/26/24 0000 139/70 98.3 °F (36.8 °C) Temporal 70 18 98 % --   11/25/24 2300 130/69 -- -- 72 18 98 % --   11/25/24 2200 135/70 -- -- 71 18 98 % --   11/25/24 2100 138/71 -- -- 71 18 99 % --   11/25/24 2000 132/68 98.1 °F (36.7 °C) Temporal 69 18 99 % --   11/25/24 1900 124/64 -- -- 71 18 98 % --   11/25/24 1800 136/72 -- -- 71 18 98 % --   11/25/24 1700 138/70 -- -- 70 18 98 % --       Intake/Output:   Last 3 shifts:   Intake/Output                   11/24/24 0700 - 11/25/24 0659 11/25/24 0700 - 11/26/24 0659 11/26/24 0700 - 11/27/24 0659       Intake    P.O.  0  0  --    P.O. 0 0 --    I.V.  2276.7  1884.9  --    I.V. 154 615 --    Volume (mL) Insulin 53.9 37 --    Volume (mL) Nitroglycerin 236.9 54.5 --    Volume (mL) Dobutamine 4.5 -- --    Volume (mL) Propofol 477.8 713.9 --    Volume (mL) Dexmedetomidine 352.2 384.5 --    Volume (mL) (dextrose 5%-sodium chloride 0.45% infusion) 997.4 80 --    NG/GT  50  150  60    Intake (mL) (NG/OG Tube Orogastric 16 Fr. Right mouth) 50 150 60    IV PIGGYBACK  600  500  --    Volume (mL) (piperacillin-tazobactam (Zosyn) 3.375 g in dextrose 5% 100 mL IVPB-ADDV) 300 200 --    Volume (mL) (acetaminophen (Ofirmev) 10 mg/mL infusion premix 1,000 mg) 200 100 --    Volume (mL) (potassium chloride 20 mEq/100mL IVPB premix 20 mEq) -- 100 --    Volume (mL) (potassium chloride 40 mEq/100mL IVPB premix (central line) 40 mEq) 100 100 --    Total Intake 2926.7 2534.9 60       Output    Urine  1800  3570  800    Output (mL) (Urethral Catheter Latex;Temperature probe;Double-lumen) 1800 3570 800    Emesis/NG output  550  365  --    Residual volume (ml) (NG/OG Tube Orogastric 16 Fr. Right mouth) -- 5 --    Output (mL) (NG/OG  Tube Orogastric 16 Fr. Right mouth) 550 360 --    Chest Tube  188  125  30    Output (mL) ([REMOVED] Chest Tube Anterior Mediastinal 32 Fr.) 48 50 --    Output (mL) (Chest Tube Anterior Pericardial 24 Fr.) 140 75 30    Total Output 2538 4060 830       Net I/O     388.7 -1525.1 -770             Vent Settings: Vent Mode: PRVC/AC  FiO2 (%):  [30 %] 30 %  S RR:  [18] 18  S VT:  [550 mL] 550 mL  PEEP/CPAP (cm H2O):  [5 cm H20] 5 cm H20  MAP (cm H2O):  [14-17] 17    Hemodynamic parameters (last 24 hours):      Scheduled Meds:    cloNIDine  1 patch Transdermal Weekly    metoprolol  5 mg Intravenous Q6H    [Held by provider] metoclopramide  5 mg Intravenous Q6H    [Held by provider] polyethylene glycol (PEG 3350)  17 g Oral BID    piperacillin-tazobactam  3.375 g Intravenous Q8H    vancomycin  12.5 mg/kg Intravenous Q24H    LORazepam  1 mg Per G Tube TID    [Held by provider] carvedilol  37.5 mg Oral BID with meals    [Held by provider] aspirin  81 mg Peg Tube Daily    QUEtiapine  50 mg Per G Tube BID    [Held by provider] senna  5 mL Per G Tube BID    [Held by provider] docusate  100 mg Oral BID    isosorbide dinitrate  40 mg Per NG Tube TID (Nitrates)    hydrALAZINE  150 mg Oral Q8H GABRIEL    amLODIPine  10 mg Oral Daily    heparin  5,000 Units Subcutaneous Q8H GABRIEL    chlorhexidine gluconate  15 mL Mouth/Throat BID    famotidine  20 mg Intravenous Daily       Continuous Infusions:    dexmedetomidine 1.5 mcg/kg/hr (12/10/24 2118)    niCARdipine 15 mg/hr (12/10/24 2219)    propofol 50 mcg/kg/min (12/10/24 2334)       Results:     Lab Results   Component Value Date    WBC 8.1 12/10/2024    HGB 8.7 (L) 12/10/2024    HCT 26.0 (L) 12/10/2024    .0 12/10/2024    CREATSERUM 1.70 (H) 12/10/2024    BUN 40 (H) 12/10/2024     12/10/2024    K 4.5 12/10/2024     (H) 12/10/2024    CO2 17.0 (L) 12/10/2024     (H) 12/10/2024    CA 8.4 (L) 12/10/2024    ALB 3.7 12/09/2024    ALKPHO 101 12/09/2024    BILT 0.3  12/09/2024    TP 6.7 12/09/2024    AST 24 12/09/2024    ALT 25 12/09/2024    PTT 30.7 12/04/2024    INR 1.17 12/04/2024    TSH 2.085 12/07/2024    NATHALIE 99 11/25/2024    LIP 28 11/25/2024    DDIMER 0.42 12/08/2019    ESRML 15 06/05/2021    MG 2.5 12/09/2024    PHOS 4.2 12/09/2024    TROP <0.045 12/08/2019     (H) 09/23/2021    B12 1,156 (H) 07/23/2018       Recent Labs   Lab 12/09/24  0609 12/09/24  0720 12/09/24  1453 12/10/24  0607   *  --  110* 143*   BUN  --  37* 33* 40*   CREATSERUM 1.51*  --  1.53* 1.70*   CA 9.1  --  9.2 8.4*     --  141 142   K  --  3.9 3.9 4.5   *  --  112 115*   CO2 17.0*  --  28.0 17.0*     Recent Labs   Lab 12/07/24  0627 12/08/24  0637 12/09/24  0609 12/10/24  0607   RBC 3.00* 3.84 3.18* 3.05*   HGB 8.4* 10.9* 9.0* 8.7*   HCT 26.2* 33.5* 27.7* 26.0*   MCV 87.3 87.2 87.1 85.2   MCH 28.0 28.4 28.3 28.5   MCHC 32.1 32.5 32.5 33.5   RDW 17.2* 17.9* 17.3* 17.3*   NEPRELIM 5.57 6.94 7.06  --    WBC 8.5 9.9 10.2 8.1   .0 270.0 165.0 313.0       No results for input(s): \"BNPML\" in the last 168 hours.    No results for input(s): \"TROP\", \"CK\" in the last 168 hours.    CT ABDOMEN+PELVIS(CPT=74176)    Result Date: 12/10/2024  CONCLUSION:  1. Fluid-filled loops of small bowel, which could reflect underlying infectious/inflammatory enteritis or malabsorption in the appropriate clinical setting. Bowel loops are mildly dilated without clear transition point to suggest mechanical bowel obstruction, although early or partial bowel obstruction could have a similar appearance.  2. Extensive distal colonic diverticulosis without CT evidence of acute complication.   3. Bladder distension despite Webb catheter placement.  4. Hepatomegaly with hepatic steatosis.  5. Diffuse anasarca.  6. Partially visualized postthoracotomy chest with possible postoperative seroma/hematoma of the anterior chest wall.  7. Bilateral pleural effusions and associated basilar atelectasis, with or  without superimposed pneumonia.  8. Additional scattered patchy nodular opacities may reflect infectious process.   9. Low-density appearance of the intracardiac blood pool raises the possibility of underlying anemia. Correlate with hematologic parameters.  10. Lesser incidental findings as above.    Dictated by (CST): Eulalio Shaikh MD on 12/10/2024 at 6:12 PM     Finalized by (CST): Eulalio Shaikh MD on 12/10/2024 at 6:21 PM          CT ABDOMEN+PELVIS(CPT=74176)    Result Date: 12/9/2024  CONCLUSION:  1. There are dilated and fluid-filled loops of small bowel without clear transition point to suggest mechanical bowel obstruction. Differential diagnostic considerations include underlying infectious/inflammatory enteritis, malabsorption, or early, developing bowel obstruction.   2. Extensive uncomplicated distal colonic diverticulosis.  3. The urinary bladder remains distended despite placement of a Webb catheter.  4. Hepatomegaly with underlying hepatic steatosis.  5. Mild anasarca.  6. Partially visualized postthoracotomy chest demonstrating small pleural effusions and associated basilar atelectasis, with or without superimposed pneumonia.  7. Additional branching opacities are evident and may reflect small airways disease.   8. Low-density appearance of the intracardiac blood pool raises the possibility of underlying anemia. Correlate with hematologic parameters.  9. Lesser incidental findings as above.    Dictated by (CST): Eulalio Shaikh MD on 12/09/2024 at 6:19 PM     Finalized by (CST): Eulalio Shaikh MD on 12/09/2024 at 6:31 PM          XR ABDOMEN (1 VIEW) (CPT=74018)    Result Date: 12/9/2024  CONCLUSION:   No huang dilatation of the small bowel to suggest small bowel obstruction.  Large cecal stool burden which may indicate constipation.  Mild stool burden throughout the remainder of the colon.      Dictated by (CST): Sudarshan Everett MD on 12/09/2024 at 8:42 AM     Finalized by (CST): Sudarshan Everett MD  on 12/09/2024 at 8:44 AM          XR CHEST AP PORTABLE  (CPT=71045)    Result Date: 12/9/2024  CONCLUSION:  1. Cardiomegaly.  Atherosclerotic aneurysmal thoracic aorta 2. Tracheostomy tube overlies tracheal air column. 3.  Bilateral mixed alveolar and interstitial multifocal airspace opacification has progressed.    Dictated by (CST): Woodrow Fischer MD on 12/09/2024 at 8:10 AM     Finalized by (CST): Woodrow Fischer MD on 12/09/2024 at 8:14 AM          XR CHEST AP PORTABLE  (CPT=71045)    Result Date: 12/8/2024  CONCLUSION:   Mildly improved patchy bilateral perihilar opacities.      Dictated by (CST): Karen Ureña MD on 12/08/2024 at 7:40 AM     Finalized by (CST): Karen Ureña MD on 12/08/2024 at 7:41 AM                    Exam:     Physical Exam:  General: awake/alert  HEENT: +trach  Neck: No JVD, carotids 2+, no bruits.  Cardiac: Regular rate and rhythm. S1, S2 normal.   Lungs: +rhonchi   Abdomen: Soft, non-tender.BS-present.  Extremities: Without clubbing or cyanosis. + BLE edema.    Neurologic: unable to obtain  Skin: Warm and dry.     Edward Montgomery, [facility] Cardi[facility] [place]

## 2024-12-11 NOTE — PROGRESS NOTES
Patient received intubated and on ventilator 18/550/+5/30%. Bilateral breath sounds auscultated. Suction provided when indicated. No acute events during the daytime.     Trach care completed, ties changed.    RT will continue to monitor.     12/11/24 1325   Vent Information   $ RT Standby Charge (per 15 min) 1   Is this patient on Chronic Ventilation? Yes   Interface Invasive   Vent Type AV   Vent plugged into main power? Yes   Vent ID    Vent Mode PRVC/AC   Settings   FiO2 (%) 30 %   Resp Rate (Set) 18   Vt (Set, mL) 550 mL   Waveform Decelerating ramp   PEEP/CPAP (cm H2O) 5 cm H20   Peak Flow LPM 60   Insp Time (sec) 0.9 sec   Trigger Sensitivity Flow (L/min) 2 L/min   Humidification Heater   H2O Bag Level 3/4 Full   Heater Temperature 98.6 °F (37 °C)   Readings   Total RR 26   Minute Ventilation (L/min) 13.4 L/min   Expiratory Tidal Volume 630 mL   PIP Observed (cm H2O) 35 cm H2O   MAP (cm H2O) 16   I/E Ratio 1:1:1   Plateau Pressure (cm H2O) 37 cm H2O   Static Compliance (L/cm H2O) 18

## 2024-12-11 NOTE — PROGRESS NOTES
[facility]  Progress Note    Alisha Wilde Patient Status:  Inpatient    10/25/1965 MRN [id number]   Location [facility] 2W/SW Attending Radha Tidwell MD   Hosp Day # 19 PCP Bridger Avendano MD     Subjective:  Patient with tracheostomy. No overnight events. Patient rouses and opens eyes, does not respond to questions. Webb with yellow urine output.    Objective/Physical Exam:  General: on ventilator, sedated, does not respond to questions.  Vital Signs:  Blood pressure 158/65, pulse 79, temperature 99.5 °F (37.5 °C), temperature source Oral, resp. rate 23, height 5' 5\" (1.651 m), weight 287 lb 14.7 oz (130.6 kg), SpO2 100%, not currently breastfeeding.  Wt Readings from Last 3 Encounters:   24 287 lb 14.7 oz (130.6 kg)   10/24/24 254 lb (115.2 kg)   24 249 lb (112.9 kg)     Lungs: No respiratory distress.  Cardiac: Regular rate and rhythm.   Abdomen:  Soft, not distended, no response to palpation of abdomen. PEG tube present.   Extremities:  No lower extremity edema noted.    Drain: Peg tube to LIS, brown cloudy output noted.    Intake/Output:    Intake/Output Summary (Last 24 hours) at 2024 1052  Last data filed at 2024 0600  Gross per 24 hour   Intake 3162 ml   Output 5550 ml   Net -2388 ml     I/O last 3 completed shifts:  In: 4815.5 [I.V.:4315.5; IV PIGGYBACK:500]  Out: 8200 [Urine:5150; Emesis/NG output:3050]  No intake/output data recorded.    Medications:    potassium chloride  40 mEq Intravenous Once    cloNIDine  1 patch Transdermal Weekly    metoprolol  5 mg Intravenous Q6H    [Held by provider] metoclopramide  5 mg Intravenous Q6H    [Held by provider] polyethylene glycol (PEG 3350)  17 g Oral BID    piperacillin-tazobactam  3.375 g Intravenous Q8H    vancomycin  12.5 mg/kg Intravenous Q24H    LORazepam  1 mg Per G Tube TID    [Held by provider] carvedilol  37.5 mg Oral BID with meals    [Held by provider] aspirin  81 mg Peg Tube Daily    QUEtiapine  50  mg Per G Tube BID    [Held by provider] senna  5 mL Per G Tube BID    [Held by provider] docusate  100 mg Oral BID    isosorbide dinitrate  40 mg Per NG Tube TID (Nitrates)    hydrALAZINE  150 mg Oral Q8H GABRIEL    amLODIPine  10 mg Oral Daily    heparin  5,000 Units Subcutaneous Q8H [facility]    chlorhexidine gluconate  15 mL Mouth/Throat BID    famotidine  20 mg Intravenous Daily       Labs:  Lab Results   Component Value Date    WBC 6.4 12/11/2024    HGB 8.2 12/11/2024    HCT 25.4 12/11/2024    .0 12/11/2024     Lab Results   Component Value Date     12/11/2024    K 3.4 12/11/2024     12/11/2024    CO2 17.0 12/11/2024    BUN 41 12/11/2024    CREATSERUM 1.85 12/11/2024     12/11/2024    CA 8.4 12/11/2024     Lab Results   Component Value Date    INR 1.17 12/04/2024    INR 1.25 (H) 11/22/2024    INR 0.99 11/21/2024         Assessment  Patient Active Problem List   Diagnosis    Hypercholesteremia    Alcoholism (HCC)    Essential hypertension    Vitamin D deficiency    Adjustment disorder with mixed anxiety and depressed mood    Controlled type 2 diabetes mellitus without complication, without long-term current use of insulin ([facility])    Obesity (BMI 30-39.9)    Neck mass    Polyp of colon    Flat feet, bilateral    Hypokalemia    Myalgia due to statin    Age-related nuclear cataract of both eyes    Positive for microalbuminuria    Dissection of ascending aorta (HCC)    NAIMA (acute kidney injury) ([facility])    Stage 3 chronic kidney disease (HCC)    Acute respiratory failure with hypoxia ([facility])    Hypervolemia     Aortic dissection s/p emergent repair 11/21  Hgb 8.2  Low grade fever 100.9  Small bowel dilation  Acute hypoxic respiratory failure requiring tracheostomy and ventilation support  S/p PEG tube placement by GI    Plan:  No acute surgical intervention indicated at this time. Will continue to monitor. Will add rectal suppositories and enemas  Continue PEG to LIS as per GI  Keep NPO, hold TF per  GI  Continue IV antibiotics  Continue care as per CCU  DVT prophylaxis with heparin  GI prophylaxis with Pepcid    Quality:  DVT Mechanical Prophylaxis: MYNOR hose, SCDs,    DVT Pharmacologic Prophylaxis   Medication    heparin (Porcine) 5000 UNIT/ML injection 5,000 Units      DVT Pharmacologic prophylaxis: Aspirin 162 mg         Code Status: Full Code  Webb: Webb catheter in place  Webb Duration (in days): 1  Central line:    BRANDON:         JORGE Awan  12/11/2024  10:52 AM

## 2024-12-11 NOTE — PLAN OF CARE
Weaning down sedation, more responsive throughout the day. Tolerating vent settings, inline secretions is improving. PEG will remain to LIS due to Xray results per Dr. Barrientos. Adequate output from aguilar. Pt comfortable with frequent nurse rounding.   Problem: Patient Centered Care  Goal: Patient preferences are identified and integrated in the patient's plan of care  Description: Interventions:  - What would you like us to know as we care for you? I work at [employer] and I am still very good friends with people from grade school! My brother, Osbaldo Prince, has been making decisions for me.  - Provide timely, complete, and accurate information to patient/family  - Incorporate patient and family knowledge, values, beliefs, and cultural backgrounds into the planning and delivery of care  - Encourage patient/family to participate in care and decision-making at the level they choose  - Honor patient and family perspectives and choices  Outcome: Progressing     Problem: Diabetes/Glucose Control  Goal: Glucose maintained within prescribed range  Description: INTERVENTIONS:  - Monitor Blood Glucose as ordered  - Assess for signs and symptoms of hyperglycemia and hypoglycemia  - Administer ordered medications to maintain glucose within target range  - Assess barriers to adequate nutritional intake and initiate nutrition consult as needed  - Instruct patient on self management of diabetes  Outcome: Progressing     Problem: Patient/Family Goals  Goal: Patient/Family Long Term Goal  Description: Patient's Long Term Goal: To discharge from the hospital safely    Interventions:  - surgical interventions, rounding, medications  - See additional Care Plan goals for specific interventions  Outcome: Progressing  Goal: Patient/Family Short Term Goal  Description: Patient's Short Term Goal: to breathe better    Interventions:   - manage the ventilator for pt until she is able to breath on her own.  - See additional Care Plan goals  for specific interventions  Outcome: Progressing     Problem: CARDIOVASCULAR - ADULT  Goal: Maintains optimal cardiac output and hemodynamic stability  Description: INTERVENTIONS:  - Monitor vital signs, rhythm, and trends  - Monitor for bleeding, hypotension and signs of decreased cardiac output  - Evaluate effectiveness of vasoactive medications to optimize hemodynamic stability  - Monitor arterial and/or venous puncture sites for bleeding and/or hematoma  - Assess quality of pulses, skin color and temperature  - Assess for signs of decreased coronary artery perfusion - ex. Angina  - Evaluate fluid balance, assess for edema, trend weights  Outcome: Progressing  Goal: Absence of cardiac arrhythmias or at baseline  Description: INTERVENTIONS:  - Continuous cardiac monitoring, monitor vital signs, obtain 12 lead EKG if indicated  - Evaluate effectiveness of antiarrhythmic and heart rate control medications as ordered  - Initiate emergency measures for life threatening arrhythmias  - Monitor electrolytes and administer replacement therapy as ordered  Outcome: Progressing     Problem: RESPIRATORY - ADULT  Goal: Achieves optimal ventilation and oxygenation  Description: INTERVENTIONS:  - Assess for changes in respiratory status  - Assess for changes in mentation and behavior  - Position to facilitate oxygenation and minimize respiratory effort  - Oxygen supplementation based on oxygen saturation or ABGs  - Provide Smoking Cessation handout, if applicable  - Encourage broncho-pulmonary hygiene including cough, deep breathe, Incentive Spirometry  - Assess the need for suctioning and perform as needed  - Assess and instruct to report SOB or any respiratory difficulty  - Respiratory Therapy support as indicated  - Manage/alleviate anxiety  - Monitor for signs/symptoms of CO2 retention  Outcome: Progressing     Problem: GASTROINTESTINAL - ADULT  Goal: Minimal or absence of nausea and vomiting  Description: INTERVENTIONS:  -  Maintain adequate hydration with IV or PO as ordered and tolerated  - Nasogastric tube to low intermittent suction as ordered  - Evaluate effectiveness of ordered antiemetic medications  - Provide nonpharmacologic comfort measures as appropriate  - Advance diet as tolerated, if ordered  - Obtain nutritional consult as needed  - Evaluate fluid balance  Outcome: Progressing  Goal: Maintains or returns to baseline bowel function  Description: INTERVENTIONS:  - Assess bowel function  - Maintain adequate hydration with IV or PO as ordered and tolerated  - Evaluate effectiveness of GI medications  - Encourage mobilization and activity  - Obtain nutritional consult as needed  - Establish a toileting routine/schedule  - Consider collaborating with pharmacy to review patient's medication profile  Outcome: Progressing     Problem: METABOLIC/FLUID AND ELECTROLYTES - ADULT  Goal: Glucose maintained within prescribed range  Description: INTERVENTIONS:  - Monitor Blood Glucose as ordered  - Assess for signs and symptoms of hyperglycemia and hypoglycemia  - Administer ordered medications to maintain glucose within target range  - Assess barriers to adequate nutritional intake and initiate nutrition consult as needed  - Instruct patient on self management of diabetes  Outcome: Progressing  Goal: Electrolytes maintained within normal limits  Description: INTERVENTIONS:  - Monitor labs and rhythm and assess patient for signs and symptoms of electrolyte imbalances  - Administer electrolyte replacement as ordered  - Monitor response to electrolyte replacements, including rhythm and repeat lab results as appropriate  - Fluid restriction as ordered  - Instruct patient on fluid and nutrition restrictions as appropriate  Outcome: Progressing  Goal: Hemodynamic stability and optimal renal function maintained  Description: INTERVENTIONS:  - Monitor labs and assess for signs and symptoms of volume excess or deficit  - Monitor intake, output  and patient weight  - Monitor urine specific gravity, serum osmolarity and serum sodium as indicated or ordered  - Monitor response to interventions for patient's volume status, including labs, urine output, blood pressure (other measures as available)  - Encourage oral intake as appropriate  - Instruct patient on fluid and nutrition restrictions as appropriate  Outcome: Progressing     Problem: SKIN/TISSUE INTEGRITY - ADULT  Goal: Skin integrity remains intact  Description: INTERVENTIONS  - Assess and document risk factors for pressure ulcer development  - Assess and document skin integrity  - Monitor for areas of redness and/or skin breakdown  - Initiate interventions, skin care algorithm/standards of care as needed  Outcome: Progressing  Goal: Incision(s), wounds(s) or drain site(s) healing without S/S of infection  Description: INTERVENTIONS:  - Assess and document risk factors for pressure ulcer development  - Assess and document skin integrity  - Assess and document dressing/incision, wound bed, drain sites and surrounding tissue  - Implement wound care per orders  - Initiate isolation precautions as appropriate  - Initiate Pressure Ulcer prevention bundle as indicated  Outcome: Progressing  Goal: Oral mucous membranes remain intact  Description: INTERVENTIONS  - Assess oral mucosa and hygiene practices  - Implement preventative oral hygiene regimen  - Implement oral medicated treatments as ordered  Outcome: Progressing     Problem: HEMATOLOGIC - ADULT  Goal: Maintains hematologic stability  Description: INTERVENTIONS  - Assess for signs and symptoms of bleeding or hemorrhage  - Monitor labs and vital signs for trends  - Administer supportive blood products/factors, fluids and medications as ordered and appropriate  - Administer supportive blood products/factors as ordered and appropriate  Outcome: Progressing     Problem: MUSCULOSKELETAL - ADULT  Goal: Return mobility to safest level of function  Description:  INTERVENTIONS:  - Assess patient stability and activity tolerance for standing, transferring and ambulating w/ or w/o assistive devices  - Assist with transfers and ambulation using safe patient handling equipment as needed  - Ensure adequate protection for wounds/incisions during mobilization  - Obtain PT/OT consults as needed  - Advance activity as appropriate  - Communicate ordered activity level and limitations with patient/family  Outcome: Progressing  Goal: Maintain proper alignment of affected body part  Description: INTERVENTIONS:  - Support and protect limb and body alignment per provider's orders  - Instruct and reinforce with patient and family use of appropriate assistive device and precautions (e.g. spinal or hip dislocation precautions)  Outcome: Progressing     Problem: NEUROLOGICAL - ADULT  Goal: Achieves stable or improved neurological status  Description: INTERVENTIONS  - Assess for and report changes in neurological status  - Initiate measures to prevent increased intracranial pressure  - Maintain blood pressure and fluid volume within ordered parameters to optimize cerebral perfusion and minimize risk of hemorrhage  - Monitor temperature, glucose, and sodium. Initiate appropriate interventions as ordered  Outcome: Progressing     Problem: PAIN - ADULT  Goal: Verbalizes/displays adequate comfort level or patient's stated pain goal  Description: INTERVENTIONS:  - Encourage pt to monitor pain and request assistance  - Assess pain using appropriate pain scale  - Administer analgesics based on type and severity of pain and evaluate response  - Implement non-pharmacological measures as appropriate and evaluate response  - Consider cultural and social influences on pain and pain management  - Manage/alleviate anxiety  - Utilize distraction and/or relaxation techniques  - Monitor for opioid side effects  - Notify MD/LIP if interventions unsuccessful or patient reports new pain  - Anticipate increased  pain with activity and pre-medicate as appropriate  Outcome: Progressing     Problem: Delirium  Goal: Minimize duration of delirium  Description: Interventions:  - Encourage use of hearing aids, eye glasses  - Promote highest level of mobility daily  - Provide frequent reorientation  - Promote wakefulness i.e. lights on, blinds open  - Promote sleep, encourage patient's normal rest cycle i.e. lights off, TV off, minimize noise and interruptions  - Encourage family to assist in orientation and promotion of home routines  Outcome: Progressing

## 2024-12-11 NOTE — PROGRESS NOTES
[facility]     Gastroenterology Progress Note    Alisha Wilde Patient Status:  Inpatient    10/25/1965 MRN [id number]   Location [facility] 2W/SW Attending Missy Paulson MD   Hosp Day # 19 PCP Bridger Avendano MD       Subjective:   D/w RN minimal output from PEG overnight.   Objective:   Blood pressure 158/65, pulse 79, temperature 99.2 °F (37.3 °C), temperature source Axillary, resp. rate 23, height 5' 5\" (1.651 m), weight 287 lb 14.7 oz (130.6 kg), SpO2 100%, not currently breastfeeding. Body mass index is 47.91 kg/m².    Gen: sedated, NAD  HEENT: mucus membranes appear moist, trach to vent  CV: RRR  Lung: no conversational dyspnea   Abdomen: soft NT, less distended today with hypoactive BS appreciated, peg luq  Skin: dry, warm, no jaundice  Ext: +edema  Neuro: sedated    Assessment and Plan:   Alisha Wilde is a 59 year old female w/ PMHx of hypertension, GERD, CKD, hyperlipidemia who presented to ER 2024 for chest pain. Underwent emergent repair of aortic type A dissection on 2024. Inability to wean from the ventillator now s/p trach and PEG placement -.  GI reconsulted for high residual TF.     # Small bowel dilation, likely ileus less likely evolving SBO  -s/p PEG   -High TF residuals since onset  -CT abdomen pelvis with dilated fluid-filled loops of small bowel, no transition point  -output from PEG appears to have slowed down today  -Repeat KUB today  - if stable can trial meds through peg  -continue to hold tube feeds  -Avoid dysmotility agents    D/w nursing    Toma Barrientos MD      Results:     Lab Results   Component Value Date    WBC 6.4 2024    HGB 8.2 (L) 2024    HCT 25.4 (L) 2024    .0 2024    CREATSERUM 1.85 (H) 2024    BUN 41 (H) 2024     2024    K 3.4 (L) 2024     (H) 2024    CO2 17.0 (L) 2024     (H) 2024    CA 8.4 (L) 2024    ALB 3.7 2024     ALKPHO 101 12/09/2024    BILT 0.3 12/09/2024    TP 6.7 12/09/2024    AST 24 12/09/2024    ALT 25 12/09/2024    PTT 30.7 12/04/2024    INR 1.17 12/04/2024    TSH 2.085 12/07/2024    NATHALIE 99 11/25/2024    LIP 28 11/25/2024    DDIMER 0.42 12/08/2019    ESRML 15 06/05/2021    MG 2.5 12/09/2024    PHOS 4.2 12/09/2024    TROP <0.045 12/08/2019     (H) 09/23/2021    B12 1,156 (H) 07/23/2018       CT ABDOMEN+PELVIS(CPT=74176)    Result Date: 12/10/2024  CONCLUSION:  1. Fluid-filled loops of small bowel, which could reflect underlying infectious/inflammatory enteritis or malabsorption in the appropriate clinical setting. Bowel loops are mildly dilated without clear transition point to suggest mechanical bowel obstruction, although early or partial bowel obstruction could have a similar appearance.  2. Extensive distal colonic diverticulosis without CT evidence of acute complication.   3. Bladder distension despite Webb catheter placement.  4. Hepatomegaly with hepatic steatosis.  5. Diffuse anasarca.  6. Partially visualized postthoracotomy chest with possible postoperative seroma/hematoma of the anterior chest wall.  7. Bilateral pleural effusions and associated basilar atelectasis, with or without superimposed pneumonia.  8. Additional scattered patchy nodular opacities may reflect infectious process.   9. Low-density appearance of the intracardiac blood pool raises the possibility of underlying anemia. Correlate with hematologic parameters.  10. Lesser incidental findings as above.    Dictated by (CST): Eulalio Shaikh MD on 12/10/2024 at 6:12 PM     Finalized by (CST): Eulalio Shaikh MD on 12/10/2024 at 6:21 PM          CT ABDOMEN+PELVIS(CPT=74176)    Result Date: 12/9/2024  CONCLUSION:  1. There are dilated and fluid-filled loops of small bowel without clear transition point to suggest mechanical bowel obstruction. Differential diagnostic considerations include underlying infectious/inflammatory enteritis,  malabsorption, or early, developing bowel obstruction.   2. Extensive uncomplicated distal colonic diverticulosis.  3. The urinary bladder remains distended despite placement of a Webb catheter.  4. Hepatomegaly with underlying hepatic steatosis.  5. Mild anasarca.  6. Partially visualized postthoracotomy chest demonstrating small pleural effusions and associated basilar atelectasis, with or without superimposed pneumonia.  7. Additional branching opacities are evident and may reflect small airways disease.   8. Low-density appearance of the intracardiac blood pool raises the possibility of underlying anemia. Correlate with hematologic parameters.  9. Lesser incidental findings as above.    Dictated by (CST): Eulalio Shaikh MD on 12/09/2024 at 6:19 PM     Finalized by (CST): Eulalio Shaikh MD on 12/09/2024 at 6:31 PM

## 2024-12-11 NOTE — PROGRESS NOTES
[facility]  part of [facility]    Progress Note    Alisha Wilde Patient Status:  Inpatient    10/25/1965 MRN [id number]   Location [facility] 2W/SW Attending Radha Tidwell MD   Hosp Day # 19 PCP Bridger Avendano MD     Subjective:  Pt sedated w/Trach on full vent support. No visitors at bedside.     Objective:  /66 (BP Location: Left arm)   Pulse 73   Temp 99.5 °F (37.5 °C) (Oral)   Resp 24   Ht 5' 5\" (1.651 m)   Wt 287 lb 14.7 oz (130.6 kg)   SpO2 100%   BMI 47.91 kg/m²     Temp (24hrs), Av.7 °F (37.6 °C), Min:98.7 °F (37.1 °C), Max:100.9 °F (38.3 °C)      Intake/Output:    Intake/Output Summary (Last 24 hours) at 2024 0824  Last data filed at 2024 0600  Gross per 24 hour   Intake 3162 ml   Output 5550 ml   Net -2388 ml       Wt Readings from Last 3 Encounters:   24 287 lb 14.7 oz (130.6 kg)   10/24/24 254 lb (115.2 kg)   24 249 lb (112.9 kg)       Allergies:  Allergies[1]    Labs:  Lab Results   Component Value Date    WBC 6.4 2024    HGB 8.2 2024    HCT 25.4 2024    .0 2024    CREATSERUM 1.85 2024    BUN  2024      Comment:      Test not reported due to hemolysis.  Test reordered by laboratory.         2024    K  2024      Comment:      Test not reported due to hemolysis.  Test reordered by laboratory.         2024    CO2 17.0 2024     2024    CA 8.4 2024       Physical Exam:  Blood pressure 157/66, pulse 73, temperature 99.5 °F (37.5 °C), temperature source Oral, resp. rate 24, height 5' 5\" (1.651 m), weight 287 lb 14.7 oz (130.6 kg), SpO2 100%, not currently breastfeeding.  Neck: Trach in place   Lungs: Coarse bilaterally anterioly   Heart: RRR, S1, S2  Abdomen: mildly distended/Obese, BS+x 4/PEG to LIS. TF off.   Extremities: Warm, dry, 1-2+ generalized UE & LE edema bilat  Pulses: 2+ bilat DP  Skin: sternotomy incision CATHIE=CDI -  healing  Neurological:  sedated    Assessment/Plan:  S/P EMERGENT AORTIC DISSECTION REPAIR POD # 19  Respiratory Failure w/multiple failed SBT prior to trach placement -currently on full vent support-continue daily weaning trials as able. Re-intubated on 11/22 per Pulm after emesis episode/ETT removal due to emesis content noted in airway.    S/P Trach placement per ENT on 12/4- Trach changed 12/10 per ENT  Completed ABX course for suspected aspiration pneumonia post op.  ABX re-started on 12/9 after emesis episode on 12/9: concern for aspiration   CT Chest/Abdomen/Pelvis (non-contrast) 11/26=no obstruction noted/stable. Abdominal X-ray 12/4=Gas. +BM (s) post op however pt w/episodes of emesis 12/9 w/stool content noted/+high PEG residuals. TF on hold. PEG to LIS. 12/9 Abd X-ray= w/constipation & CT Abd/Pelvis=  w/o obstruction. Mgt per GI.  Hx: HTN: on multiple antihypertensives including IV Cardene-weaning as able: SBP goal= <130/MAP >70. Mgt per Cards. New A-line placed 12/2  New PICC placed 12/2  Pain meds as needed-avoid/limit narcotics w/recent constipation/emesis episodes  Scds/Heparin SubQ prophylaxis DVT prevention. HIPA 11/25=negative. Plts continue >100,000  Continue ICU status  Hx: CKD. Expected post op volume overload w/mgt per Nephrology (consulted on 11/23) for NAIMA: slight increase CR today. Limit/avoid nephrotoxic meds: Lasix per Nephrology. Aguilar remains for close I/O monitoring/immobility-new aguilar placed 12/9 and 12/10 due to obstruction. Kidney US w/doppler on 12/8   Expected post op anemia: w/o clinical significance/stable. May be slightly diluted due to volume overload.   Hx: Morbid obesity w/BMI >40-plan for RD to see when appropriate  Nutrition per TF via PEG-currently on hold due to high PEG residuals. PEG placed 12/5 per GI.    Hx: ETOH abuse-monitor for withdrawals. CIWA protocol PRN   Pt on Ativan TID/Seroquel BID.  Discharge planning: pt lives alone and has supportive family.    Anticipate LTAC at discharge: referrals sent proactively by  on 11/29 for LTAC.       Plan of care discussed w/RN and CV Surgeon: Dr. Marysol Noel, APRN  12/11/2024  8:24 AM       [1]   Allergies  Allergen Reactions    Atorvastatin MYALGIA    Pravastatin MYALGIA    Rosuvastatin MYALGIA    Seasonal Runny nose

## 2024-12-11 NOTE — PROGRESS NOTES
[facility]  part of [facility]    Progress Note      Subjective:     Patient has  dark brown ?stool output from peg and trach collar.. S/p tracheostomy tube exchange    Off of lasix gtt     Review of Systems:     Unable to obtain    Objective:   Temp:  [98.7 °F (37.1 °C)-99.5 °F (37.5 °C)] 99.3 °F (37.4 °C)  Pulse:  [70-96] 88  Resp:  [19-27] 27  BP: (121-157)/() 157/72  SpO2:  [100 %] 100 %  FiO2 (%):  [30 %] 30 %  SpO2: 100 %     Intake/Output Summary (Last 24 hours) at 12/10/2024 2036  Last data filed at 12/10/2024 1935  Gross per 24 hour   Intake 980.84 ml   Output 4700 ml   Net -3719.16 ml     Wt Readings from Last 3 Encounters:   12/09/24 297 lb 9.9 oz (135 kg)   10/24/24 254 lb (115.2 kg)   05/17/24 249 lb (112.9 kg)       General appearance: Intubated -sleepy  Head: Normocephalic, atraumatic  Neck:  no JVD, supple, symmetrical  Skin: No rashes or lesions  Neurologic: unable to examine       Medications:  Current Facility-Administered Medications   Medication Dose Route Frequency    dexmedeTOMIDine (Precedex) 800 mcg in sodium chloride 0.9% 100 mL infusion  0.2-1.5 mcg/kg/hr (Dosing Weight) Intravenous Continuous    [Held by provider] metoclopramide (Reglan) 5 mg/mL injection 5 mg  5 mg Intravenous Q6H    [Held by provider] polyethylene glycol (PEG 3350) (Miralax) 17 g oral packet 17 g  17 g Oral BID    cloNIDine (Catapres) tab 0.3 mg  0.3 mg Oral TID    sodium chloride 3 % nebulizer solution 3 mL  3 mL Nebulization PRN    albuterol (Ventolin) (2.5 MG/3ML) 0.083% nebulizer solution 2.5 mg  2.5 mg Nebulization Q6H PRN    piperacillin-tazobactam (Zosyn) 3.375 g in dextrose 5% 100 mL IVPB-ADDV  3.375 g Intravenous Q8H    vancomycin (Vancocin) 1.75 g in sodium chloride 0.9% 500mL IVPB premix  12.5 mg/kg Intravenous Q24H    hydrALAzine (Apresoline) 20 mg/mL injection 10 mg  10 mg Intravenous Q4H PRN    LORazepam (Ativan) tab 1 mg  1 mg Per G Tube TID    carvedilol (Coreg) tab 37.5 mg   37.5 mg Oral BID with meals    acetaminophen (Tylenol) 160 MG/5ML oral liquid 650 mg  650 mg Oral Q6H PRN    [Held by provider] aspirin chewable tab 81 mg  81 mg Peg Tube Daily    QUEtiapine (SEROquel) tab 50 mg  50 mg Per G Tube BID    niCARdipine (carDENE) 125 mg in sodium chloride 0.9% 250 mL infusion  5-15 mg/hr Intravenous Continuous    [Held by provider] senna (Senokot) 8.8 MG/5ML oral syrup 8.8 mg  5 mL Per G Tube BID    [Held by provider] docusate (Colace) 50 MG/5ML oral solution 100 mg  100 mg Oral BID    isosorbide dinitrate (Isordil) tab 40 mg  40 mg Per NG Tube TID (Nitrates)    hydrALAZINE (Apresoline) tab 150 mg  150 mg Oral Q8H GABRIEL    amLODIPine (Norvasc) tab 10 mg  10 mg Oral Daily    ipratropium-albuterol (Duoneb) 0.5-2.5 (3) MG/3ML inhalation solution 3 mL  3 mL Nebulization Q6H PRN    HYDROcodone-acetaminophen (Norco) 5-325 MG per tab 2 tablet  2 tablet Oral Q4H PRN    heparin (Porcine) 5000 UNIT/ML injection 5,000 Units  5,000 Units Subcutaneous Q8H GABRIEL    melatonin tab 3 mg  3 mg Oral Nightly PRN    bisacodyl (Dulcolax) 10 MG rectal suppository 10 mg  10 mg Rectal Daily PRN    potassium chloride 20 mEq/100mL IVPB premix 20 mEq  20 mEq Intravenous PRN    Or    potassium chloride 40 mEq/100mL IVPB premix (central line) 40 mEq  40 mEq Intravenous PRN    magnesium sulfate in dextrose 5% 1 g/100mL infusion premix 1 g  1 g Intravenous PRN    magnesium sulfate in sterile water for injection 2 g/50mL IVPB premix 2 g  2 g Intravenous PRN    chlorhexidine gluconate (Peridex) 0.12 % oral solution 15 mL  15 mL Mouth/Throat BID    propofol (Diprivan) 10 mg/mL infusion premix  5-50 mcg/kg/min (Dosing Weight) Intravenous Continuous    famotidine (Pepcid) 20 mg/2mL injection 20 mg  20 mg Intravenous Daily          Results:     Recent Labs   Lab 12/07/24  0627 12/08/24  0637 12/09/24  0609 12/10/24  0607   RBC 3.00* 3.84 3.18* 3.05*   HGB 8.4* 10.9* 9.0* 8.7*   HCT 26.2* 33.5* 27.7* 26.0*   MCV 87.3 87.2 87.1  85.2   NEPRELIM 5.57 6.94 7.06  --    WBC 8.5 9.9 10.2 8.1   .0 270.0 165.0 313.0     Recent Labs   Lab 12/05/24  0416 12/06/24  0534 12/07/24  0627 12/08/24  0637 12/09/24  0609 12/09/24  0720 12/09/24  1453 12/10/24  0607   *   < > 113* 158* 132*  --  110* 143*   BUN 41*   < > 36* 38*  --  37* 33* 40*   CREATSERUM 1.61*   < > 1.41* 1.48* 1.51*  --  1.53* 1.70*   CA 9.1   < > 8.8 8.4* 9.1  --  9.2 8.4*   ALB 3.6   < > 3.3 3.8 3.7  --   --   --       < > 145 141 143  --  141 142   K 4.0   < > 3.8 5.1  5.1  --  3.9 3.9 4.5   *   < > 117* 116* 115*  --  112 115*   CO2 17.0*   < > 18.0* 18.0* 17.0*  --  28.0 17.0*   ALKPHO 86  --   --   --  101  --   --   --    AST 31  --   --   --   --  24  --   --    ALT 33  --   --   --  28 25  --   --    BILT 0.4  --   --   --  0.3  --   --   --    TP 6.3  --   --   --  6.7  --   --   --     < > = values in this interval not displayed.     PTT   Date Value Ref Range Status   12/04/2024 30.7 23.0 - 36.0 seconds Final     INR   Date Value Ref Range Status   12/04/2024 1.17 0.80 - 1.20 Final     Comment:     Therapeutic INR range for patients on warfarin:  2.0-3.0 for most medical conditions and surgical prophylaxis   2.5-3.5 for mechanical heart valves and recurrent thromboembolism    Direct thrombin inhibitors (e.g. argatroban, bivalirudin), factor Xa inhibitors (e.g. apixaban, rivaroxaban), and conditions such as coagulation factor deficiency, vitamin K deficiency, and liver disease will prolong the prothrombin time/INR.    Unfractionated heparin and low molecular weight heparin do not affect the prothrombin time/INR up to a concentration of 1 IU/mL and 1.5 IU/mL, respectively.         No results for input(s): \"BNP\" in the last 168 hours.  Recent Labs   Lab 12/07/24  0627 12/08/24  0637 12/09/24  0609   MG 2.5 2.2 2.5   PHOS 4.1 4.2 4.2        Recent Labs   Lab 12/07/24  0627 12/08/24  0637 12/09/24  0609   PHOS 4.1 4.2 4.2   ALB 3.3 3.8 3.7       CT  ABDOMEN+PELVIS(CPT=74176)    Result Date: 12/10/2024  CONCLUSION:  1. Fluid-filled loops of small bowel, which could reflect underlying infectious/inflammatory enteritis or malabsorption in the appropriate clinical setting. Bowel loops are mildly dilated without clear transition point to suggest mechanical bowel obstruction, although early or partial bowel obstruction could have a similar appearance.  2. Extensive distal colonic diverticulosis without CT evidence of acute complication.   3. Bladder distension despite Webb catheter placement.  4. Hepatomegaly with hepatic steatosis.  5. Diffuse anasarca.  6. Partially visualized postthoracotomy chest with possible postoperative seroma/hematoma of the anterior chest wall.  7. Bilateral pleural effusions and associated basilar atelectasis, with or without superimposed pneumonia.  8. Additional scattered patchy nodular opacities may reflect infectious process.   9. Low-density appearance of the intracardiac blood pool raises the possibility of underlying anemia. Correlate with hematologic parameters.  10. Lesser incidental findings as above.    Dictated by (CST): Eulalio Shaikh MD on 12/10/2024 at 6:12 PM     Finalized by (CST): Eulalio Shaikh MD on 12/10/2024 at 6:21 PM          CT ABDOMEN+PELVIS(CPT=74176)    Result Date: 12/9/2024  CONCLUSION:  1. There are dilated and fluid-filled loops of small bowel without clear transition point to suggest mechanical bowel obstruction. Differential diagnostic considerations include underlying infectious/inflammatory enteritis, malabsorption, or early, developing bowel obstruction.   2. Extensive uncomplicated distal colonic diverticulosis.  3. The urinary bladder remains distended despite placement of a Webb catheter.  4. Hepatomegaly with underlying hepatic steatosis.  5. Mild anasarca.  6. Partially visualized postthoracotomy chest demonstrating small pleural effusions and associated basilar atelectasis, with or without  superimposed pneumonia.  7. Additional branching opacities are evident and may reflect small airways disease.   8. Low-density appearance of the intracardiac blood pool raises the possibility of underlying anemia. Correlate with hematologic parameters.  9. Lesser incidental findings as above.    Dictated by (CST): Eulalio Shaikh MD on 12/09/2024 at 6:19 PM     Finalized by (CST): Eulalio Shaikh MD on 12/09/2024 at 6:31 PM          XR ABDOMEN (1 VIEW) (CPT=74018)    Result Date: 12/9/2024  CONCLUSION:   No huang dilatation of the small bowel to suggest small bowel obstruction.  Large cecal stool burden which may indicate constipation.  Mild stool burden throughout the remainder of the colon.      Dictated by (CST): Sudarshan Everett MD on 12/09/2024 at 8:42 AM     Finalized by (CST): Sudarshan Everett MD on 12/09/2024 at 8:44 AM          XR CHEST AP PORTABLE  (CPT=71045)    Result Date: 12/9/2024  CONCLUSION:  1. Cardiomegaly.  Atherosclerotic aneurysmal thoracic aorta 2. Tracheostomy tube overlies tracheal air column. 3.  Bilateral mixed alveolar and interstitial multifocal airspace opacification has progressed.    Dictated by (CST): Woodrow Fischer MD on 12/09/2024 at 8:10 AM     Finalized by (CST): Woodrow Fischer MD on 12/09/2024 at 8:14 AM               Assessment and Plan:       1.NAIMA on CKD stage III: Nonoliguric on diuretic  NAIMA secondary to MARY LOU/ATN.  creatinine currently mostly at 1.4 1.5 mg/dL . Cr at 1.7 mg/dl today.   Was on IV furosemide 40 mg twice daily-changed to furosemide 10 mg/h given volume overload - was taken off of it yesterday   Will give one dose     2.aortic dissection: S/p emergent aortic dissection repair  CT surgery input noted  S/p CT chest abdomen pelvis   Has required packed red blood cell transfusions for drop in hemoglobin    3.respiratory failure:  S/p tracheostomy -unable to extubate the patient    4 -renal cyst  The renal ultrasound showed a mildly complex cyst in the right  kidney.  This will need to be followed up with surveillance ultrasound    5.abdominal distention: S/p PEG 12/5  High tube feed residuals-s/p KUB bowel obstruction or ileus but large stool burden  S/p bowel regimen  Currently tube feed on hold  CTAP to rule out obstruction - GI input noted. Plan for peg to low intermittent suction.   Plan for kub in am     6.volume overload/bilateral lower extremity edema:  Per I's and O's patient close to 9 L positive balance.  Weight gain 8-10 lbs  Diuretics changed to Lasix gtt. at 10 mg/h and now off .     7.hypertension: Blood pressure well-controlled well patient is calm  On nicardipine gtt  Renal ultrasound with Doppler unremarkable for any renal artery stenosis    Discussed with nursing     Robbi Resendiz MD

## 2024-12-11 NOTE — PLAN OF CARE
Pt rec'd in bed; sedated/drowsy; sedative gtt's titrated according to RASS. Oral / airway suctioning performed prn with copious, brown drainage noted. Foly in-place; draining yellow / clear urine to gravity. Lasix IVP x1 administered per order. Pt transported to CT; awaiting results. CHG bath done.     Problem: PAIN - ADULT  Goal: Verbalizes/displays adequate comfort level or patient's stated pain goal  Description: INTERVENTIONS:  - Encourage pt to monitor pain and request assistance  - Assess pain using appropriate pain scale  - Administer analgesics based on type and severity of pain and evaluate response  - Implement non-pharmacological measures as appropriate and evaluate response  - Consider cultural and social influences on pain and pain management  - Manage/alleviate anxiety  - Utilize distraction and/or relaxation techniques  - Monitor for opioid side effects  - Notify MD/LIP if interventions unsuccessful or patient reports new pain  - Anticipate increased pain with activity and pre-medicate as appropriate  Outcome: Progressing     Problem: HEMATOLOGIC - ADULT  Goal: Maintains hematologic stability  Description: INTERVENTIONS  - Assess for signs and symptoms of bleeding or hemorrhage  - Monitor labs and vital signs for trends  - Administer supportive blood products/factors, fluids and medications as ordered and appropriate  - Administer supportive blood products/factors as ordered and appropriate  Outcome: Progressing     Problem: GASTROINTESTINAL - ADULT  Goal: Minimal or absence of nausea and vomiting  Description: INTERVENTIONS:  - Maintain adequate hydration with IV or PO as ordered and tolerated  - Nasogastric tube to low intermittent suction as ordered  - Evaluate effectiveness of ordered antiemetic medications  - Provide nonpharmacologic comfort measures as appropriate  - Advance diet as tolerated, if ordered  - Obtain nutritional consult as needed  - Evaluate fluid balance  Outcome: Progressing

## 2024-12-12 ENCOUNTER — APPOINTMENT (OUTPATIENT)
Dept: GENERAL RADIOLOGY | Facility: HOSPITAL | Age: 59
DRG: 003 | End: 2024-12-12
Attending: INTERNAL MEDICINE
Payer: COMMERCIAL

## 2024-12-12 ENCOUNTER — APPOINTMENT (OUTPATIENT)
Dept: GENERAL RADIOLOGY | Facility: HOSPITAL | Age: 59
End: 2024-12-12
Attending: INTERNAL MEDICINE
Payer: COMMERCIAL

## 2024-12-12 PROBLEM — K56.7 ILEUS (HCC): Status: ACTIVE | Noted: 2024-12-12

## 2024-12-12 PROBLEM — F39 EPISODIC MOOD DISORDER: Status: ACTIVE | Noted: 2024-12-12

## 2024-12-12 PROBLEM — R41.0 DELIRIUM: Status: ACTIVE | Noted: 2024-12-12

## 2024-12-12 PROBLEM — F39 EPISODIC MOOD DISORDER (HCC): Status: ACTIVE | Noted: 2024-12-12

## 2024-12-12 LAB
ANION GAP SERPL CALC-SCNC: 11 MMOL/L (ref 0–18)
ATRIAL RATE: 85 BPM
BASOPHILS # BLD AUTO: 0.04 X10(3) UL (ref 0–0.2)
BASOPHILS NFR BLD AUTO: 0.6 %
BUN BLD-MCNC: 33 MG/DL (ref 9–23)
BUN/CREAT SERPL: 22.9 (ref 10–20)
CALCIUM BLD-MCNC: 8.8 MG/DL (ref 8.7–10.4)
CHLORIDE SERPL-SCNC: 116 MMOL/L (ref 98–112)
CO2 SERPL-SCNC: 19 MMOL/L (ref 21–32)
CREAT BLD-MCNC: 1.44 MG/DL
DEPRECATED RDW RBC AUTO: 51.8 FL (ref 35.1–46.3)
EGFRCR SERPLBLD CKD-EPI 2021: 41 ML/MIN/1.73M2 (ref 60–?)
EOSINOPHIL # BLD AUTO: 0.5 X10(3) UL (ref 0–0.7)
EOSINOPHIL NFR BLD AUTO: 7.7 %
ERYTHROCYTE [DISTWIDTH] IN BLOOD BY AUTOMATED COUNT: 16.8 % (ref 11–15)
GLUCOSE BLD-MCNC: 122 MG/DL (ref 70–99)
GLUCOSE BLDC GLUCOMTR-MCNC: 133 MG/DL (ref 70–99)
GLUCOSE BLDC GLUCOMTR-MCNC: 137 MG/DL (ref 70–99)
HCT VFR BLD AUTO: 25.3 %
HGB BLD-MCNC: 8.3 G/DL
IMM GRANULOCYTES # BLD AUTO: 0.14 X10(3) UL (ref 0–1)
IMM GRANULOCYTES NFR BLD: 2.1 %
LYMPHOCYTES # BLD AUTO: 1.16 X10(3) UL (ref 1–4)
LYMPHOCYTES NFR BLD AUTO: 17.8 %
MAGNESIUM SERPL-MCNC: 2.4 MG/DL (ref 1.6–2.6)
MCH RBC QN AUTO: 28.1 PG (ref 26–34)
MCHC RBC AUTO-ENTMCNC: 32.8 G/DL (ref 31–37)
MCV RBC AUTO: 85.8 FL
MONOCYTES # BLD AUTO: 1.05 X10(3) UL (ref 0.1–1)
MONOCYTES NFR BLD AUTO: 16.1 %
NEUTROPHILS # BLD AUTO: 3.64 X10 (3) UL (ref 1.5–7.7)
NEUTROPHILS # BLD AUTO: 3.64 X10(3) UL (ref 1.5–7.7)
NEUTROPHILS NFR BLD AUTO: 55.7 %
OSMOLALITY SERPL CALC.SUM OF ELEC: 311 MOSM/KG (ref 275–295)
P AXIS: 67 DEGREES
P-R INTERVAL: 180 MS
PHOSPHATE SERPL-MCNC: 3.1 MG/DL (ref 2.4–5.1)
PLATELET # BLD AUTO: 275 10(3)UL (ref 150–450)
PLATELET MORPHOLOGY: NORMAL
POTASSIUM SERPL-SCNC: 3.2 MMOL/L (ref 3.5–5.1)
Q-T INTERVAL: 374 MS
QRS DURATION: 82 MS
QTC CALCULATION (BEZET): 445 MS
R AXIS: 24 DEGREES
RBC # BLD AUTO: 2.95 X10(6)UL
SODIUM SERPL-SCNC: 146 MMOL/L (ref 136–145)
T AXIS: -39 DEGREES
TRIGL SERPL-MCNC: 335 MG/DL (ref 30–149)
VENTRICULAR RATE: 85 BPM
WBC # BLD AUTO: 6.5 X10(3) UL (ref 4–11)

## 2024-12-12 PROCEDURE — 71045 X-RAY EXAM CHEST 1 VIEW: CPT | Performed by: INTERNAL MEDICINE

## 2024-12-12 PROCEDURE — 99291 CRITICAL CARE FIRST HOUR: CPT | Performed by: INTERNAL MEDICINE

## 2024-12-12 PROCEDURE — 99233 SBSQ HOSP IP/OBS HIGH 50: CPT | Performed by: FAMILY MEDICINE

## 2024-12-12 PROCEDURE — 90792 PSYCH DIAG EVAL W/MED SRVCS: CPT | Performed by: NURSE PRACTITIONER

## 2024-12-12 PROCEDURE — 99233 SBSQ HOSP IP/OBS HIGH 50: CPT | Performed by: INTERNAL MEDICINE

## 2024-12-12 PROCEDURE — 74018 RADEX ABDOMEN 1 VIEW: CPT | Performed by: INTERNAL MEDICINE

## 2024-12-12 RX ORDER — HALOPERIDOL 5 MG/ML
2 INJECTION INTRAMUSCULAR EVERY 6 HOURS PRN
Status: DISCONTINUED | OUTPATIENT
Start: 2024-12-12 | End: 2024-12-17

## 2024-12-12 RX ORDER — LORAZEPAM 2 MG/ML
2 INJECTION INTRAMUSCULAR EVERY 4 HOURS PRN
Status: DISCONTINUED | OUTPATIENT
Start: 2024-12-12 | End: 2024-12-15

## 2024-12-12 RX ORDER — LORAZEPAM 2 MG/ML
1 INJECTION INTRAMUSCULAR 3 TIMES DAILY
Status: DISCONTINUED | OUTPATIENT
Start: 2024-12-12 | End: 2024-12-15

## 2024-12-12 RX ORDER — FUROSEMIDE 10 MG/ML
40 INJECTION INTRAMUSCULAR; INTRAVENOUS ONCE
Status: COMPLETED | OUTPATIENT
Start: 2024-12-12 | End: 2024-12-12

## 2024-12-12 NOTE — DIETARY NOTE
ADULT NUTRITION REASSESSMENT    Pt is at high nutrition risk.  Pt does not meet malnutrition criteria.      RECOMMENDATIONS TO MD: See Nutrition Intervention for PN specifics . Pt is s/p tach and PEG placement. Now with worsening ileus. Day #3 of TF being held. Pt was tolerating at goal rate prior to ileus. Discussed with RN and PA via secure chat- ok with Dr. Barrientos to initiate TPN tonight during period of NPO/No EN.    ADMITTING DIAGNOSIS:  Dissection of ascending aorta (HCC)  PERTINENT PAST MEDICAL HISTORY:   Past Medical History:    Chronic kidney disease (CKD)    Esophageal reflux    High blood pressure    High cholesterol    HYPERTENSION    Hypertension    Unspecified essential hypertension     PATIENT STATUS: Initial 11/27/24: RD received consult to initiate tube feedings. Pt is POD #5 emergent aortic dissection and repair. Pt has been intubated, unable to extubate failing SBT's. NPO x 6 days today. Well-nourished. No weight loss, actually some gain since admission, likely r/t post op fluid changes. On Lasix. No pressors. Not at significant risk for refeeding syndrome.   1127:  -Attempted to discuss with CYNDIE Milan via phone call. RN states Propofol is current running at 45 mcg/kg/min (31.6ml/hr providing 835 kcal from lipids).   -RN states IV fluids currently running: D5/NaCl 0.45%. States current rate in chart is accurate (40 ml/hr). IVF status has not been updated in active meds nor in flowsheets since 11/25 @ 0600.   -RN states pt continues on insulin drip per protocol \"while pt is intubated.\"   -Sent secure chat to APN today to confirm if IVF/insulin drip will continue once tube feedings are started. Awaiting response.     Update 12/2/24: Pt cont to require full vent support. Failing SBTs. Will need trach and PEG this week per MD chart notes. Receiving high dose propofol  at 50mcg/kg/min. Blood sugars well-controlled. Non DM, off insulin gtt. Pt having thick secretions requiring significant amount of  suction. Having BM's, last on 11/30. Now with new open keloid wound to right breast. Seen by wound care RN. Cont TF, tolerating at goal rate with modular protein added BID.     Update 12/5/24: Pt had new trach placed yesterday, doing well. Restraints off during the day. PEG placed this AM by Dr. Barrientos. Plans to resume Gtube feedings if safe after 24 hours following placement. Will adjust TF order as appropriate tomorrow in anticipation of resuming via Gtube. Monitor propofol titration/dc. On Reglan. May need to utilize renal formula until phos comes down WNL.     Update 12/10/24: TF was stopped yesterday. Per MD/RN chart notes, pt with high residuals, episode of emesis, and now fecal-like matter found around trach opening. Was cleaned and replaced by ENT. Pt has been having BM's, appropriate stool consistency for being tube fed. Had KUB done, no ileus or bowel obstruction, but large stool burden seen. On bowel regimen. Will avoid/limit narcotics. Continuing to hold TF for now. Monitor for nutrition plan.     Early Reassessment 12/12/24: New consult to initiate TPN tonight. Now with ileus. Feculent output from trach, nose, and TF material in mouth when suctioning. +Emesis with aspiration. TF have been on hold x 3 days. Ordered appropriate labs to review prior to TPN start. No IVF. Trying to wean off sedation, Propofol currently off since 0845 today. Still on Precedex. Double lumen PICC line in place to right basilic and able to be used for TPN per RN.     FOOD/NUTRITION RELATED HISTORY:  Appetite: NPO  Intake: NPO  Intake Meeting Needs: NPO and TPN will meet needs once at goal rate . TF remains on hold (ileus)  Percent Meals Eaten (last 6 days)       None            Food Allergies: No Known Food Allergies (NKFA)  Cultural/Ethnic/Adventism Preferences: Not Obtained    GASTROINTESTINAL:  +BM 12/10 -after enema. On bowel regimen. +ileus, PEG tube to LIS.  UOP - fairly good with diuresis, but worsening renal function/NAIMA.  Lasix stopped now.     UOP last 24 hr: 1975 ml  Fluid status net + 6937 ml since 11/28, but trending down. Today is fluid -1388 ml  PEG output: 12/11 - 600 ml, 12/10 -1650 ml, 12/9 -1200 ml  Total:  3450 ml x last 3 days    MEDICATIONS: reviewed; abx providing total of 800 ml fluid daily   labetalol (Trandate) 200 mg in sodium chloride 0.9% 200 mL infusion 0.5 mg/min (12/12/24 1020)    dexmedetomidine 1.5 mcg/kg/hr (12/12/24 0649)    niCARdipine 15 mg/hr (12/12/24 0051)    propofol Stopped (12/12/24 0844)      cloNIDine  1 patch Transdermal Weekly    metoprolol  5 mg Intravenous Q6H    piperacillin-tazobactam  3.375 g Intravenous Q8H    vancomycin  12.5 mg/kg Intravenous Q24H    LORazepam  1 mg Per G Tube TID    carvedilol  37.5 mg Oral BID with meals    aspirin  81 mg Peg Tube Daily    QUEtiapine  50 mg Per G Tube BID    isosorbide dinitrate  40 mg Per NG Tube TID (Nitrates)    hydrALAZINE  150 mg Oral Q8H GABRIEL    amLODIPine  10 mg Oral Daily    heparin  5,000 Units Subcutaneous Q8H GABRIEL    chlorhexidine gluconate  15 mL Mouth/Throat BID    famotidine  20 mg Intravenous Daily     LABS: reviewed; slight hypernatremia, significant hypokalemia - 40mEq rider given today per lyte protocol.   Recent Labs     12/09/24  0609 12/09/24  0720 12/09/24  1453 12/10/24  0607 12/11/24  0547 12/11/24  0759 12/12/24  0443   *  --  110* 143* 121*  --  122*   BUN  --    < > 33* 40*  --  41* 33*   CREATSERUM 1.51*  --  1.53* 1.70* 1.85*  --  1.44*   CA 9.1  --  9.2 8.4* 8.4*  --  8.8   MG 2.5  --   --   --   --   --  2.4     --  141 142 143  --  146*   K  --    < > 3.9 4.5  --  3.4* 3.2*  3.2*   *  --  112 115* 113*  --  116*   CO2 17.0*  --  28.0 17.0* 17.0*  --  19.0*   PHOS 4.2  --   --   --   --   --  3.1   OSMOCALC  --   --  300* 306*  --   --  311*    < > = values in this interval not displayed.     NUTRITION RELATED PHYSICAL FINDINGS:  - Nutrition Focused Physical Exam (NFPE): well nourished  - Fluid  Accumulation:  +1-2 edema to various areas of body/generalized  ---> See RN documentation for details  - Skin Integrity: surgical wound(s) and other wounds noted ---> See RN documentation for details    ANTHROPOMETRICS:  HT: 165.1 cm (5' 5\")  WT: 106.8 kg (235 lb 7.2 oz) - wt trending down ~23# lower than admission with diuresis? Unlikely pt lost 52# in one day, even with high output via PEG tube and UOP. Lasix on hold for NAIMA. Remains fluid overloaded with significant edema and overall net positive fluid status.   BMI: Body mass index is 39.18 kg/m².  BMI CLASSIFICATION: 35-39.9 kg/m2 - obesity class II  IBW: 115 lbs        246% IBW  Usual Body Wt: ~249 lbs in the past per EMR        115% UBW  Dosing Wt: 249# (113 kg) - utilized for energy calculations.     WEIGHT HISTORY:  Patient Weight(s) for the past 336 hrs:   Weight   12/12/24 0530 106.8 kg (235 lb 7.2 oz)   12/11/24 0400 130.6 kg (287 lb 14.7 oz)   12/09/24 0540 135 kg (297 lb 9.9 oz)   12/08/24 1500 103.6 kg (228 lb 4.8 oz)   12/07/24 0400 131 kg (288 lb 12.8 oz)   12/06/24 0551 129.4 kg (285 lb 4.4 oz)   12/05/24 0426 128 kg (282 lb 3 oz)   12/04/24 0322 132 kg (291 lb 0.1 oz)   12/03/24 0300 129.5 kg (285 lb 7.9 oz)   12/01/24 0600 130.8 kg (288 lb 5.8 oz)   11/30/24 0300 121.8 kg (268 lb 8.3 oz)   11/29/24 0600 120.8 kg (266 lb 5.1 oz)     Wt Readings from Last 10 Encounters:   12/12/24 106.8 kg (235 lb 7.2 oz)   10/24/24 115.2 kg (254 lb)   05/17/24 112.9 kg (249 lb)   02/22/24 112.9 kg (249 lb)   12/06/23 112.9 kg (249 lb)   10/09/23 112.5 kg (248 lb)   09/25/23 111.1 kg (245 lb)   08/10/23 111.7 kg (246 lb 4.1 oz)   08/09/23 111.7 kg (246 lb 4.8 oz)   03/25/23 109.3 kg (241 lb)     NUTRITION DIAGNOSIS/PROBLEM:   Inadequate oral intake related to Decreased ability to consume sufficient energy as evidenced by NPO status and no PO intake during admission, and intubation.    Progress:   No improvement, continue - TF on hold. Starting TPN  today    NUTRITION INTERVENTION:     NUTRITION PRESCRIPTION:   Estimated Nutrition needs: --dosing wt of 113 kg - wt taken on 5/17 - likely close to dry/usual weight  Calories: 6043-4669 calories/day (15-17 calories per kg Dosing wt)  [place]: 2180 kcal (recalculated on 12/12)  Protein: 62- 104g protein/day (1.2-2 g protein/kg Ideal body wt (IBW))  Fluid Needs: 1ml/kcal or 25ml/kg adjusted weight for obesity= 1900 ml (76 kg adjusted IBW)    - Diet:       Procedures    NPO     -Parenteral Nutrition: 1800ml volume. 75g protein (300 kcal), 117g dextrose (400 kcal), and 40g lipids (400 kcal). Provides a total of 1100 kcal and meets ~65% of ,minimum estimated energy needs and 100% of estimated protein needs.     - RD Care Plan:  initiate TPN  - Medical Food Supplements-RD added NPO. Rational/use of oral supplements discussed.  - Vitamin and mineral supplements: none  - Feeding assistance: NPO  - Nutrition education: not appropriate     - Coordination of nutrition care: collaboration with other providers  - Discharge and transfer of nutrition care to new setting or provider: monitor plans    MONITOR AND EVALUATE/NUTRITION GOALS:  - Food and Nutrient Intake:      Monitor: N/A  - Food and Nutrient Administration:      Monitor: resumption of enteral nutrition, TPN initiation, TPN tolerance, propofol rate, and TF on hold for ileus.   - Anthropometric Measurement:    Monitor weight  - Nutrition Goals:      allow wt loss due to fluid losses, long term gradual wt loss, TPN to meet greater than 80% nutrition needs, labs within acceptable limits, monitor fluid status, and improved GI status    DIETITIAN FOLLOW UP: RD to follow and monitor nutrition status       Stefany Argueta RD, LDN  Clinical Dietitian  P: [phone]

## 2024-12-12 NOTE — PROGRESS NOTES
[facility]  part of [facility]    Progress Note    Alisha Wilde Patient Status:  Inpatient    10/25/1965 MRN [id number]   Location [facility] 2W/SW Attending Radha Tidwell MD   Hosp Day # 20 PCP Bridger Avendano MD     Subjective:  Pt resting in bed. Less sedation today. Continues on vent w/Trach in place. Eyes opened and able to nod head to simple questions. Denies pain and SOB. Able to wiggle toes bilaterally and squeeze hands-very weak.   No visitors at bedside.     Objective:  /65 (BP Location: Left arm)   Pulse 75   Temp 99.3 °F (37.4 °C) (Oral)   Resp 22   Ht 5' 5\" (1.651 m)   Wt 235 lb 7.2 oz (106.8 kg)   SpO2 99%   BMI 39.18 kg/m²     Temp (24hrs), Av.7 °F (37.6 °C), Min:99 °F (37.2 °C), Max:100.3 °F (37.9 °C)      Intake/Output:    Intake/Output Summary (Last 24 hours) at 2024 0909  Last data filed at 2024 0646  Gross per 24 hour   Intake 3193.31 ml   Output 2575 ml   Net 618.31 ml       Wt Readings from Last 3 Encounters:   24 235 lb 7.2 oz (106.8 kg)   10/24/24 254 lb (115.2 kg)   24 249 lb (112.9 kg)       Allergies:  Allergies[1]    Labs:  Lab Results   Component Value Date    WBC 6.5 2024    HGB 8.3 2024    HCT 25.3 2024    .0 2024    CREATSERUM 1.44 2024    BUN 33 2024     2024    K 3.2 2024    K 3.2 2024     2024    CO2 19.0 2024     2024    CA 8.8 2024       Physical Exam:  Blood pressure 149/65, pulse 75, temperature 99.3 °F (37.4 °C), temperature source Oral, resp. rate 22, height 5' 5\" (1.651 m), weight 235 lb 7.2 oz (106.8 kg), SpO2 99%, not currently breastfeeding.  Neck: Trach in place  Lungs: Coarse bilaterally anteriorly  Heart: RRR, S1, S2  Abdomen: Softer today/Obese, slightly distended/ BS+x 4/PEG to LIS due to increased residuals.TF off. Daily enemas.   Extremities: Warm, dry, generalized 1+ UE & LE edema  bilat  Pulses: 2+ bilat DP  Skin: sternotomy incision CATHIE=CDI-healing well  Neurological:  awake, nods head to simple questions/moves all ext on command    Assessment/Plan:  S/P EMERGENT AORTIC DISSECTION REPAIR POD # 20  Respiratory Failure w/multiple failed SBT prior to trach placement -currently on full vent support-continue daily weaning trials w/mgt per Pulm. Re-intubated on 11/22 per Pulm after emesis episode/ETT removal due to emesis content noted in airway.    S/P Trach placement per ENT on 12/4 w/Trach changed 12/10   Completed ABX course for suspected aspiration pneumonia post op.  ABX re-started on 12/9 after emesis episode on 12/9: concern for aspiration   +BM (s) post op however pt w/episode of emesis on 12/9 w/stool content noted/+high PEG residuals. TF continue on hold due to persistently high residuals. PEG to LIS.  Mgt per GI.  Hx: HTN: on multiple antihypertensives including IV Cardene-weaning as able: SBP goal= <130/MAP >70. Mgt per Cards. New A-line placed 12/2  New PICC placed 12/2  Pain meds as needed-avoid/limit narcotics w/recent constipation/emesis episodes/dilated bowel loops on Abdominal X-ray   Scds/Heparin SubQ prophylaxis DVT prevention. HIPA 11/25=negative. Plts continue >100,000  Continue ICU status  Hx: CKD. Expected post op volume overload w/mgt per Nephrology (consulted on 11/23) for NAIMA: CR improved today. Limit/avoid nephrotoxic meds: Lasix per Nephrology. Aguilar remains for close I/O monitoring/immobility-new aguilar placed 12/9 then again 12/10 due to obstruction. Kidney US w/doppler on 12/8. Suspect wgt discrepancy today due to significantly different than previous days: recheck   Expected post op anemia: w/o clinical significance/stable. May be slightly diluted due to volume overload.   Hx: Morbid obesity w/BMI >40-plan for RD to see when appropriate  Nutrition per TF via PEG-continues on hold due to high PEG residuals. PEG placed 12/5 per GI.  PEG to LIS.  Hx: ETOH  abuse-monitor for withdrawals. CIWA protocol PRN   Pt on Ativan TID/Seroquel BID.  Discharge planning: pt lives alone and has supportive family.   Anticipate LTAC at discharge: referrals sent proactively by  on 11/29 for LTAC.       Plan of care discussed w/RN and CV Surgeon: Dr. Marysol Noel, APRN  12/12/2024  9:09 AM         [1]   Allergies  Allergen Reactions    Atorvastatin MYALGIA    Pravastatin MYALGIA    Rosuvastatin MYALGIA    Seasonal Runny nose

## 2024-12-12 NOTE — CM/SW NOTE
Sister encouraged to obtain paperwork for FMLA from employer.    Patient has active insurance through employer.  For RML, will need isha filed and additional financial paperwork provided.  Sister does not have access to patient's finances as surgery was emergent.    [facility] can accept under current insurance.  [employer]'s policy has 120 days combined for LTAC and SNF.    Sister will speak with [facility] liaison as they also have [place] locations that family can easily visit.    Zohra Melendez MBA BSN RN CRRN   RN Case Manager  [phone]

## 2024-12-12 NOTE — PROGRESS NOTES
[facility]  part of [facility]    Progress Note      Subjective:     Patient getting started on labetalol gtt.  Reduce output from PEG tube.    On precedex     Review of Systems:     Unable to obtain    Objective:   Temp:  [99 °F (37.2 °C)-100.3 °F (37.9 °C)] 99.3 °F (37.4 °C)  Pulse:  [75-99] 88  Resp:  [16-27] 18  BP: (145-171)/(57-93) 148/57  SpO2:  [88 %-100 %] 100 %  FiO2 (%):  [30 %] 30 %  SpO2: 100 %     Intake/Output Summary (Last 24 hours) at 12/12/2024 1135  Last data filed at 12/12/2024 0646  Gross per 24 hour   Intake 3193.31 ml   Output 2575 ml   Net 618.31 ml     Wt Readings from Last 3 Encounters:   12/12/24 235 lb 7.2 oz (106.8 kg)   10/24/24 254 lb (115.2 kg)   05/17/24 249 lb (112.9 kg)       General appearance: Intubated -sedated  Head: Normocephalic, atraumatic  Neck:  no JVD, supple, symmetrical  Skin: No rashes or lesions  Neurologic: unable to examine       Medications:  Current Facility-Administered Medications   Medication Dose Route Frequency    labetalol (Trandate) 200 mg in sodium chloride 0.9% 200 mL infusion  0.5-10 mg/min Intravenous Continuous    fentaNYL (Sublimaze) 50 mcg/mL injection 25 mcg  25 mcg Intravenous Q2H PRN    fentaNYL (Sublimaze) 50 mcg/mL injection 50 mcg  50 mcg Intravenous Q2H PRN    bisacodyl (Dulcolax) 10 MG rectal suppository 10 mg  10 mg Rectal Daily PRN    dexmedeTOMIDine (Precedex) 800 mcg in sodium chloride 0.9% 100 mL infusion  0.2-1.5 mcg/kg/hr (Dosing Weight) Intravenous Continuous    acetaminophen (Ofirmev) 10 mg/mL infusion premix 1,000 mg  1,000 mg Intravenous Q6H PRN    cloNIDine (Catapres) 0.3 MG/24HR patch 1 patch  1 patch Transdermal Weekly    metoprolol (Lopressor) 5 mg/5mL injection 5 mg  5 mg Intravenous Q6H    sodium chloride 3 % nebulizer solution 3 mL  3 mL Nebulization PRN    albuterol (Ventolin) (2.5 MG/3ML) 0.083% nebulizer solution 2.5 mg  2.5 mg Nebulization Q6H PRN    piperacillin-tazobactam (Zosyn) 3.375 g in  dextrose 5% 100 mL IVPB-ADDV  3.375 g Intravenous Q8H    vancomycin (Vancocin) 1.75 g in sodium chloride 0.9% 500mL IVPB premix  12.5 mg/kg Intravenous Q24H    hydrALAzine (Apresoline) 20 mg/mL injection 10 mg  10 mg Intravenous Q4H PRN    LORazepam (Ativan) tab 1 mg  1 mg Per G Tube TID    carvedilol (Coreg) tab 37.5 mg  37.5 mg Oral BID with meals    acetaminophen (Tylenol) 160 MG/5ML oral liquid 650 mg  650 mg Oral Q6H PRN    aspirin chewable tab 81 mg  81 mg Peg Tube Daily    QUEtiapine (SEROquel) tab 50 mg  50 mg Per G Tube BID    niCARdipine (carDENE) 125 mg in sodium chloride 0.9% 250 mL infusion  5-15 mg/hr Intravenous Continuous    isosorbide dinitrate (Isordil) tab 40 mg  40 mg Per NG Tube TID (Nitrates)    hydrALAZINE (Apresoline) tab 150 mg  150 mg Oral Q8H GABRIEL    amLODIPine (Norvasc) tab 10 mg  10 mg Oral Daily    ipratropium-albuterol (Duoneb) 0.5-2.5 (3) MG/3ML inhalation solution 3 mL  3 mL Nebulization Q6H PRN    HYDROcodone-acetaminophen (Norco) 5-325 MG per tab 2 tablet  2 tablet Oral Q4H PRN    heparin (Porcine) 5000 UNIT/ML injection 5,000 Units  5,000 Units Subcutaneous Q8H GABRIEL    melatonin tab 3 mg  3 mg Oral Nightly PRN    potassium chloride 20 mEq/100mL IVPB premix 20 mEq  20 mEq Intravenous PRN    Or    potassium chloride 40 mEq/100mL IVPB premix (central line) 40 mEq  40 mEq Intravenous PRN    magnesium sulfate in dextrose 5% 1 g/100mL infusion premix 1 g  1 g Intravenous PRN    magnesium sulfate in sterile water for injection 2 g/50mL IVPB premix 2 g  2 g Intravenous PRN    chlorhexidine gluconate (Peridex) 0.12 % oral solution 15 mL  15 mL Mouth/Throat BID    propofol (Diprivan) 10 mg/mL infusion premix  5-50 mcg/kg/min (Dosing Weight) Intravenous Continuous    famotidine (Pepcid) 20 mg/2mL injection 20 mg  20 mg Intravenous Daily          Results:     Recent Labs   Lab 12/08/24  0637 12/09/24  0609 12/10/24  0607 12/11/24  0547 12/12/24  0443   RBC 3.84 3.18* 3.05* 2.94* 2.95*   HGB  10.9* 9.0* 8.7* 8.2* 8.3*   HCT 33.5* 27.7* 26.0* 25.4* 25.3*   MCV 87.2 87.1 85.2 86.4 85.8   NEPRELIM 6.94 7.06  --   --  3.64   WBC 9.9 10.2 8.1 6.4 6.5   .0 165.0 313.0 369.0 275.0     Recent Labs   Lab 12/07/24  0627 12/08/24  0637 12/09/24  0609 12/09/24  0720 12/09/24  1453 12/10/24  0607 12/11/24  0547 12/11/24  0759 12/12/24  0443   * 158* 132*  --    < > 143* 121*  --  122*   BUN 36* 38*  --  37*   < > 40*  --  41* 33*   CREATSERUM 1.41* 1.48* 1.51*  --    < > 1.70* 1.85*  --  1.44*   CA 8.8 8.4* 9.1  --    < > 8.4* 8.4*  --  8.8   ALB 3.3 3.8 3.7  --   --   --   --   --   --     141 143  --    < > 142 143  --  146*   K 3.8 5.1  5.1  --  3.9   < > 4.5  --  3.4* 3.2*  3.2*   * 116* 115*  --    < > 115* 113*  --  116*   CO2 18.0* 18.0* 17.0*  --    < > 17.0* 17.0*  --  19.0*   ALKPHO  --   --  101  --   --   --   --   --   --    AST  --   --   --  24  --   --   --   --   --    ALT  --   --  28 25  --   --   --   --   --    BILT  --   --  0.3  --   --   --   --   --   --    TP  --   --  6.7  --   --   --   --   --   --     < > = values in this interval not displayed.     PTT   Date Value Ref Range Status   12/04/2024 30.7 23.0 - 36.0 seconds Final     INR   Date Value Ref Range Status   12/04/2024 1.17 0.80 - 1.20 Final     Comment:     Therapeutic INR range for patients on warfarin:  2.0-3.0 for most medical conditions and surgical prophylaxis   2.5-3.5 for mechanical heart valves and recurrent thromboembolism    Direct thrombin inhibitors (e.g. argatroban, bivalirudin), factor Xa inhibitors (e.g. apixaban, rivaroxaban), and conditions such as coagulation factor deficiency, vitamin K deficiency, and liver disease will prolong the prothrombin time/INR.    Unfractionated heparin and low molecular weight heparin do not affect the prothrombin time/INR up to a concentration of 1 IU/mL and 1.5 IU/mL, respectively.         No results for input(s): \"BNP\" in the last 168 hours.  Recent  Labs   Lab 12/07/24  0627 12/08/24  0637 12/09/24  0609   MG 2.5 2.2 2.5   PHOS 4.1 4.2 4.2        Recent Labs   Lab 12/07/24  0627 12/08/24  0637 12/09/24  0609   PHOS 4.1 4.2 4.2   ALB 3.3 3.8 3.7       XR CHEST AP PORTABLE  (CPT=71045)    Result Date: 12/12/2024  CONCLUSION:  1. Cardiomegaly.  Tortuous aorta. 2. Bilateral mixed multifocal airspace consolidation with slight improved aeration left lung base. 3. Tracheostomy tube overlies the tracheal air column.  Right PICC line with the tip in the SVC.     Dictated by (CST): Woodrow Fischer MD on 12/12/2024 at 11:28 AM     Finalized by (CST): Woodrow Fischer MD on 12/12/2024 at 11:31 AM          CT ABDOMEN+PELVIS(CPT=74176)    Result Date: 12/10/2024  CONCLUSION:  1. Fluid-filled loops of small bowel, which could reflect underlying infectious/inflammatory enteritis or malabsorption in the appropriate clinical setting. Bowel loops are mildly dilated without clear transition point to suggest mechanical bowel obstruction, although early or partial bowel obstruction could have a similar appearance.  2. Extensive distal colonic diverticulosis without CT evidence of acute complication.   3. Bladder distension despite Webb catheter placement.  4. Hepatomegaly with hepatic steatosis.  5. Diffuse anasarca.  6. Partially visualized postthoracotomy chest with possible postoperative seroma/hematoma of the anterior chest wall.  7. Bilateral pleural effusions and associated basilar atelectasis, with or without superimposed pneumonia.  8. Additional scattered patchy nodular opacities may reflect infectious process.   9. Low-density appearance of the intracardiac blood pool raises the possibility of underlying anemia. Correlate with hematologic parameters.  10. Lesser incidental findings as above.    Dictated by (CST): Eulalio Shaikh MD on 12/10/2024 at 6:12 PM     Finalized by (CST): Eulalio Shaikh MD on 12/10/2024 at 6:21 PM               Assessment and Plan:        1.NAIMA on CKD stage III: Nonoliguric on diuretic  NAIMA secondary to MARY LOU/ATN.  creatinine currently mostly at 1.4 1.5 mg/dL .   Kidney function worsening with Cr 1.7 ->1.85 mg/dl -> 1.44 mg/dl today   Was on IV furosemide 40 mg twice daily-changed to furosemide 10 mg/h given volume overload - was taken off of it on 12/10   Will give one dose of furosemide today - receiving it on prn basis     2.aortic dissection: S/p emergent aortic dissection repair  CT surgery input noted  S/p CT chest abdomen pelvis   Has required packed red blood cell transfusions for drop in hemoglobin  Replace potassium   Hyperchloremic metabolic acidosis: GI loss. IV bicarb push today   Hypernatremia: monitor for now. Might need dexttrose gtt     3.respiratory failure:  S/p tracheostomy -unable to extubate the patient    4 -renal cyst  The renal ultrasound showed a mildly complex cyst in the right kidney.  This will need to be followed up with surveillance ultrasound    5.abdominal distention: ileua:S/p PEG 12/5  High tube feed residuals-s/p KUB bowel obstruction or ileus but large stool burden  S/p bowel regimen  Currently tube feed on hold and peg tube to suction   CTAP to rule out obstruction -- none seen. GI input noted. Plan for peg to low intermittent suction.   Kub ileus    6.volume overload/bilateral lower extremity edema:  Prn lasix. Off of gtt for now. Give one dose today     7.hypertension: Blood pressure well-controlled well patient is calm  On labetolol gtt  Renal ultrasound with Doppler unremarkable for any renal artery stenosis    Discussed with nursing and CTS team    Robbi Resendiz MD

## 2024-12-12 NOTE — PLAN OF CARE
Intermittent aggression from PT. Noncompliant with suctioning at times. 400cc from G tube; coffee ground. Good urinary output.    Problem: Patient Centered Care  Goal: Patient preferences are identified and integrated in the patient's plan of care  Description: Interventions:  - What would you like us to know as we care for you? I work at [employer] and I am still very good friends with people from grade school! My brother, Osbaldo Prince, has been making decisions for me.  - Provide timely, complete, and accurate information to patient/family  - Incorporate patient and family knowledge, values, beliefs, and cultural backgrounds into the planning and delivery of care  - Encourage patient/family to participate in care and decision-making at the level they choose  - Honor patient and family perspectives and choices  Outcome: Progressing     Problem: Diabetes/Glucose Control  Goal: Glucose maintained within prescribed range  Description: INTERVENTIONS:  - Monitor Blood Glucose as ordered  - Assess for signs and symptoms of hyperglycemia and hypoglycemia  - Administer ordered medications to maintain glucose within target range  - Assess barriers to adequate nutritional intake and initiate nutrition consult as needed  - Instruct patient on self management of diabetes  Outcome: Progressing     Problem: Patient/Family Goals  Goal: Patient/Family Long Term Goal  Description: Patient's Long Term Goal: To discharge from the hospital safely    Interventions:  - surgical interventions, rounding, medications  - See additional Care Plan goals for specific interventions  Outcome: Progressing  Goal: Patient/Family Short Term Goal  Description: Patient's Short Term Goal: to breathe better    Interventions:   - manage the ventilator for pt until she is able to breath on her own.  - See additional Care Plan goals for specific interventions  Outcome: Progressing     Problem: CARDIOVASCULAR - ADULT  Goal: Maintains optimal cardiac  output and hemodynamic stability  Description: INTERVENTIONS:  - Monitor vital signs, rhythm, and trends  - Monitor for bleeding, hypotension and signs of decreased cardiac output  - Evaluate effectiveness of vasoactive medications to optimize hemodynamic stability  - Monitor arterial and/or venous puncture sites for bleeding and/or hematoma  - Assess quality of pulses, skin color and temperature  - Assess for signs of decreased coronary artery perfusion - ex. Angina  - Evaluate fluid balance, assess for edema, trend weights  Outcome: Progressing  Goal: Absence of cardiac arrhythmias or at baseline  Description: INTERVENTIONS:  - Continuous cardiac monitoring, monitor vital signs, obtain 12 lead EKG if indicated  - Evaluate effectiveness of antiarrhythmic and heart rate control medications as ordered  - Initiate emergency measures for life threatening arrhythmias  - Monitor electrolytes and administer replacement therapy as ordered  Outcome: Progressing     Problem: RESPIRATORY - ADULT  Goal: Achieves optimal ventilation and oxygenation  Description: INTERVENTIONS:  - Assess for changes in respiratory status  - Assess for changes in mentation and behavior  - Position to facilitate oxygenation and minimize respiratory effort  - Oxygen supplementation based on oxygen saturation or ABGs  - Provide Smoking Cessation handout, if applicable  - Encourage broncho-pulmonary hygiene including cough, deep breathe, Incentive Spirometry  - Assess the need for suctioning and perform as needed  - Assess and instruct to report SOB or any respiratory difficulty  - Respiratory Therapy support as indicated  - Manage/alleviate anxiety  - Monitor for signs/symptoms of CO2 retention  Outcome: Progressing     Problem: GASTROINTESTINAL - ADULT  Goal: Minimal or absence of nausea and vomiting  Description: INTERVENTIONS:  - Maintain adequate hydration with IV or PO as ordered and tolerated  - Nasogastric tube to low intermittent suction as  ordered  - Evaluate effectiveness of ordered antiemetic medications  - Provide nonpharmacologic comfort measures as appropriate  - Advance diet as tolerated, if ordered  - Obtain nutritional consult as needed  - Evaluate fluid balance  Outcome: Progressing  Goal: Maintains or returns to baseline bowel function  Description: INTERVENTIONS:  - Assess bowel function  - Maintain adequate hydration with IV or PO as ordered and tolerated  - Evaluate effectiveness of GI medications  - Encourage mobilization and activity  - Obtain nutritional consult as needed  - Establish a toileting routine/schedule  - Consider collaborating with pharmacy to review patient's medication profile  Outcome: Progressing     Problem: METABOLIC/FLUID AND ELECTROLYTES - ADULT  Goal: Glucose maintained within prescribed range  Description: INTERVENTIONS:  - Monitor Blood Glucose as ordered  - Assess for signs and symptoms of hyperglycemia and hypoglycemia  - Administer ordered medications to maintain glucose within target range  - Assess barriers to adequate nutritional intake and initiate nutrition consult as needed  - Instruct patient on self management of diabetes  Outcome: Progressing  Goal: Electrolytes maintained within normal limits  Description: INTERVENTIONS:  - Monitor labs and rhythm and assess patient for signs and symptoms of electrolyte imbalances  - Administer electrolyte replacement as ordered  - Monitor response to electrolyte replacements, including rhythm and repeat lab results as appropriate  - Fluid restriction as ordered  - Instruct patient on fluid and nutrition restrictions as appropriate  Outcome: Progressing  Goal: Hemodynamic stability and optimal renal function maintained  Description: INTERVENTIONS:  - Monitor labs and assess for signs and symptoms of volume excess or deficit  - Monitor intake, output and patient weight  - Monitor urine specific gravity, serum osmolarity and serum sodium as indicated or ordered  -  Monitor response to interventions for patient's volume status, including labs, urine output, blood pressure (other measures as available)  - Encourage oral intake as appropriate  - Instruct patient on fluid and nutrition restrictions as appropriate  Outcome: Progressing     Problem: SKIN/TISSUE INTEGRITY - ADULT  Goal: Skin integrity remains intact  Description: INTERVENTIONS  - Assess and document risk factors for pressure ulcer development  - Assess and document skin integrity  - Monitor for areas of redness and/or skin breakdown  - Initiate interventions, skin care algorithm/standards of care as needed  Outcome: Progressing  Goal: Incision(s), wounds(s) or drain site(s) healing without S/S of infection  Description: INTERVENTIONS:  - Assess and document risk factors for pressure ulcer development  - Assess and document skin integrity  - Assess and document dressing/incision, wound bed, drain sites and surrounding tissue  - Implement wound care per orders  - Initiate isolation precautions as appropriate  - Initiate Pressure Ulcer prevention bundle as indicated  Outcome: Progressing  Goal: Oral mucous membranes remain intact  Description: INTERVENTIONS  - Assess oral mucosa and hygiene practices  - Implement preventative oral hygiene regimen  - Implement oral medicated treatments as ordered  Outcome: Progressing     Problem: HEMATOLOGIC - ADULT  Goal: Maintains hematologic stability  Description: INTERVENTIONS  - Assess for signs and symptoms of bleeding or hemorrhage  - Monitor labs and vital signs for trends  - Administer supportive blood products/factors, fluids and medications as ordered and appropriate  - Administer supportive blood products/factors as ordered and appropriate  Outcome: Progressing     Problem: MUSCULOSKELETAL - ADULT  Goal: Return mobility to safest level of function  Description: INTERVENTIONS:  - Assess patient stability and activity tolerance for standing, transferring and ambulating w/ or  w/o assistive devices  - Assist with transfers and ambulation using safe patient handling equipment as needed  - Ensure adequate protection for wounds/incisions during mobilization  - Obtain PT/OT consults as needed  - Advance activity as appropriate  - Communicate ordered activity level and limitations with patient/family  Outcome: Progressing  Goal: Maintain proper alignment of affected body part  Description: INTERVENTIONS:  - Support and protect limb and body alignment per provider's orders  - Instruct and reinforce with patient and family use of appropriate assistive device and precautions (e.g. spinal or hip dislocation precautions)  Outcome: Progressing     Problem: NEUROLOGICAL - ADULT  Goal: Achieves stable or improved neurological status  Description: INTERVENTIONS  - Assess for and report changes in neurological status  - Initiate measures to prevent increased intracranial pressure  - Maintain blood pressure and fluid volume within ordered parameters to optimize cerebral perfusion and minimize risk of hemorrhage  - Monitor temperature, glucose, and sodium. Initiate appropriate interventions as ordered  Outcome: Progressing     Problem: PAIN - ADULT  Goal: Verbalizes/displays adequate comfort level or patient's stated pain goal  Description: INTERVENTIONS:  - Encourage pt to monitor pain and request assistance  - Assess pain using appropriate pain scale  - Administer analgesics based on type and severity of pain and evaluate response  - Implement non-pharmacological measures as appropriate and evaluate response  - Consider cultural and social influences on pain and pain management  - Manage/alleviate anxiety  - Utilize distraction and/or relaxation techniques  - Monitor for opioid side effects  - Notify MD/LIP if interventions unsuccessful or patient reports new pain  - Anticipate increased pain with activity and pre-medicate as appropriate  Outcome: Progressing     Problem: Delirium  Goal: Minimize  duration of delirium  Description: Interventions:  - Encourage use of hearing aids, eye glasses  - Promote highest level of mobility daily  - Provide frequent reorientation  - Promote wakefulness i.e. lights on, blinds open  - Promote sleep, encourage patient's normal rest cycle i.e. lights off, TV off, minimize noise and interruptions  - Encourage family to assist in orientation and promotion of home routines  Outcome: Progressing

## 2024-12-12 NOTE — PROGRESS NOTES
[facility]  part of [facility]    Cardiology Progress Note    Alisha Wilde Patient Status:  Inpatient    10/25/1965 MRN [id number]   Location [facility] 2W/SW Attending Aguila Villegas MD   Hosp Day # 20 PCP Bridger Avendano MD     Interval History   Sedation weaning, currently off propofol  BP has increased  Ileus appears to be have worsened    Assessment and Plan:   Acute hypoxic respiratory failure  Suspected ileus, associated with fecal matter aspiration and emesis  Type A aortic dissection  NAIMA on CKD-III, improved  Obesity  EtOH abuse  -presented with acute onset CP   -CT with type A aortic dissection above right coronary artery and extending distally into the left common iliac artery and external iliac   -s/p emergent successful repair on 24  -had aspiration event following self-extubation, re-intubated with multiple failed SBT; now s/p trach/PEG tube with aspiration event with stool   -on broad spectrum Abx and NPO again    Hypertension  -TTE on  with hyperdynamic LVEF  -continue BP management - substantial component of agitation worsening BP now that she is actively undergoing sedation wean   -NPO after aspiration event and worsening ileus   -holding coreg from 37.5 mg, clonidine  0.3 mg 3 times daily, combination hydralazine + isosorbide 150mg-40mg TID, amlodipine 10 mg daily   -continue nicardipine gtt   -start labetalol gtt   -Continue clonidine patch  -monitor rhythm on tele    Objective:   Patient Vitals for the past 24 hrs:   BP Temp Temp src Pulse Resp SpO2 Weight   24 1500 119/66 -- -- 70 18 92 % --   24 1400 124/66 -- -- 71 18 95 % --   24 1300 122/66 -- -- 68 18 97 % --   24 1200 124/68 98.4 °F (36.9 °C) Axillary 68 18 97 % --   24 1100 127/75 -- -- 69 18 98 % --   24 1000 129/71 -- -- 69 18 97 % --   24 0900 128/69 -- -- 70 18 97 % --   24 0800 (!) 143/118 97.7 °F (36.5 °C) Temporal 70 18 98 % --    11/26/24 0700 130/70 -- -- 69 18 99 % --   11/26/24 0621 -- -- -- -- -- -- 282 lb 3 oz (128 kg)   11/26/24 0600 95/53 -- -- 68 21 97 % --   11/26/24 0500 121/67 -- -- 70 18 97 % --   11/26/24 0400 119/68 98.4 °F (36.9 °C) Temporal 72 18 98 % --   11/26/24 0200 109/60 -- -- 72 21 96 % --   11/26/24 0130 -- -- -- 72 18 97 % --   11/26/24 0100 142/73 -- -- 71 18 98 % --   11/26/24 0030 -- -- -- 71 18 99 % --   11/26/24 0000 139/70 98.3 °F (36.8 °C) Temporal 70 18 98 % --   11/25/24 2300 130/69 -- -- 72 18 98 % --   11/25/24 2200 135/70 -- -- 71 18 98 % --   11/25/24 2100 138/71 -- -- 71 18 99 % --   11/25/24 2000 132/68 98.1 °F (36.7 °C) Temporal 69 18 99 % --   11/25/24 1900 124/64 -- -- 71 18 98 % --   11/25/24 1800 136/72 -- -- 71 18 98 % --   11/25/24 1700 138/70 -- -- 70 18 98 % --       Intake/Output:   Last 3 shifts:   Intake/Output                   11/24/24 0700 - 11/25/24 0659 11/25/24 0700 - 11/26/24 0659 11/26/24 0700 - 11/27/24 0659       Intake    P.O.  0  0  --    P.O. 0 0 --    I.V.  2276.7  1884.9  --    I.V. 154 615 --    Volume (mL) Insulin 53.9 37 --    Volume (mL) Nitroglycerin 236.9 54.5 --    Volume (mL) Dobutamine 4.5 -- --    Volume (mL) Propofol 477.8 713.9 --    Volume (mL) Dexmedetomidine 352.2 384.5 --    Volume (mL) (dextrose 5%-sodium chloride 0.45% infusion) 997.4 80 --    NG/GT  50  150  60    Intake (mL) (NG/OG Tube Orogastric 16 Fr. Right mouth) 50 150 60    IV PIGGYBACK  600  500  --    Volume (mL) (piperacillin-tazobactam (Zosyn) 3.375 g in dextrose 5% 100 mL IVPB-ADDV) 300 200 --    Volume (mL) (acetaminophen (Ofirmev) 10 mg/mL infusion premix 1,000 mg) 200 100 --    Volume (mL) (potassium chloride 20 mEq/100mL IVPB premix 20 mEq) -- 100 --    Volume (mL) (potassium chloride 40 mEq/100mL IVPB premix (central line) 40 mEq) 100 100 --    Total Intake 2926.7 2534.9 60       Output    Urine  1800  3570  800    Output (mL) (Urethral Catheter Latex;Temperature probe;Double-lumen) 1800  3570 800    Emesis/NG output  550  365  --    Residual volume (ml) (NG/OG Tube Orogastric 16 Fr. Right mouth) -- 5 --    Output (mL) (NG/OG Tube Orogastric 16 Fr. Right mouth) 550 360 --    Chest Tube  188  125  30    Output (mL) ([REMOVED] Chest Tube Anterior Mediastinal 32 Fr.) 48 50 --    Output (mL) (Chest Tube Anterior Pericardial 24 Fr.) 140 75 30    Total Output 2538 4060 830       Net I/O     388.7 -1525.1 -770             Vent Settings: Vent Mode: PRVC/AC  FiO2 (%):  [30 %] 30 %  S RR:  [18] 18  S VT:  [550 mL] 550 mL  PEEP/CPAP (cm H2O):  [5 cm H20] 5 cm H20  MAP (cm H2O):  [12-16] 14    Hemodynamic parameters (last 24 hours):      Scheduled Meds:    cloNIDine  1 patch Transdermal Weekly    metoprolol  5 mg Intravenous Q6H    piperacillin-tazobactam  3.375 g Intravenous Q8H    vancomycin  12.5 mg/kg Intravenous Q24H    LORazepam  1 mg Per G Tube TID    carvedilol  37.5 mg Oral BID with meals    aspirin  81 mg Peg Tube Daily    QUEtiapine  50 mg Per G Tube BID    isosorbide dinitrate  40 mg Per NG Tube TID (Nitrates)    hydrALAZINE  150 mg Oral Q8H GABRIEL    amLODIPine  10 mg Oral Daily    heparin  5,000 Units Subcutaneous Q8H GABRIEL    chlorhexidine gluconate  15 mL Mouth/Throat BID    famotidine  20 mg Intravenous Daily       Continuous Infusions:    labetalol (Trandate) 200 mg in sodium chloride 0.9% 200 mL infusion 0.5 mg/min (12/12/24 1020)    dexmedetomidine 1.5 mcg/kg/hr (12/12/24 0649)    niCARdipine 15 mg/hr (12/12/24 0051)    propofol Stopped (12/12/24 0844)       Results:     Lab Results   Component Value Date    WBC 6.5 12/12/2024    HGB 8.3 (L) 12/12/2024    HCT 25.3 (L) 12/12/2024    .0 12/12/2024    CREATSERUM 1.44 (H) 12/12/2024    BUN 33 (H) 12/12/2024     (H) 12/12/2024    K 3.2 (L) 12/12/2024    K 3.2 (L) 12/12/2024     (H) 12/12/2024    CO2 19.0 (L) 12/12/2024     (H) 12/12/2024    CA 8.8 12/12/2024    ALB 3.7 12/09/2024    ALKPHO 101 12/09/2024    BILT 0.3 12/09/2024     TP 6.7 12/09/2024    AST 24 12/09/2024    ALT 25 12/09/2024    PTT 30.7 12/04/2024    INR 1.17 12/04/2024    TSH 2.085 12/07/2024    NATHLAIE 99 11/25/2024    LIP 28 11/25/2024    DDIMER 0.42 12/08/2019    ESRML 15 06/05/2021    MG 2.5 12/09/2024    PHOS 4.2 12/09/2024    TROP <0.045 12/08/2019     (H) 09/23/2021    B12 1,156 (H) 07/23/2018       Recent Labs   Lab 12/10/24  0607 12/11/24  0547 12/11/24  0759 12/12/24 0443   * 121*  --  122*   BUN 40*  --  41* 33*   CREATSERUM 1.70* 1.85*  --  1.44*   CA 8.4* 8.4*  --  8.8    143  --  146*   K 4.5  --  3.4* 3.2*  3.2*   * 113*  --  116*   CO2 17.0* 17.0*  --  19.0*     Recent Labs   Lab 12/08/24  0637 12/09/24  0609 12/10/24  0607 12/11/24  0547 12/12/24  0443   RBC 3.84 3.18* 3.05* 2.94* 2.95*   HGB 10.9* 9.0* 8.7* 8.2* 8.3*   HCT 33.5* 27.7* 26.0* 25.4* 25.3*   MCV 87.2 87.1 85.2 86.4 85.8   MCH 28.4 28.3 28.5 27.9 28.1   MCHC 32.5 32.5 33.5 32.3 32.8   RDW 17.9* 17.3* 17.3* 17.4* 16.8*   NEPRELIM 6.94 7.06  --   --  3.64   WBC 9.9 10.2 8.1 6.4 6.5   .0 165.0 313.0 369.0 275.0       No results for input(s): \"BNPML\" in the last 168 hours.    No results for input(s): \"TROP\", \"CK\" in the last 168 hours.    CT ABDOMEN+PELVIS(CPT=74176)    Result Date: 12/10/2024  CONCLUSION:  1. Fluid-filled loops of small bowel, which could reflect underlying infectious/inflammatory enteritis or malabsorption in the appropriate clinical setting. Bowel loops are mildly dilated without clear transition point to suggest mechanical bowel obstruction, although early or partial bowel obstruction could have a similar appearance.  2. Extensive distal colonic diverticulosis without CT evidence of acute complication.   3. Bladder distension despite Webb catheter placement.  4. Hepatomegaly with hepatic steatosis.  5. Diffuse anasarca.  6. Partially visualized postthoracotomy chest with possible postoperative seroma/hematoma of the anterior chest wall.  7.  Bilateral pleural effusions and associated basilar atelectasis, with or without superimposed pneumonia.  8. Additional scattered patchy nodular opacities may reflect infectious process.   9. Low-density appearance of the intracardiac blood pool raises the possibility of underlying anemia. Correlate with hematologic parameters.  10. Lesser incidental findings as above.    Dictated by (CST): Eulalio Shaikh MD on 12/10/2024 at 6:12 PM     Finalized by (CST): Eulalio Shaikh MD on 12/10/2024 at 6:21 PM          CT ABDOMEN+PELVIS(CPT=74176)    Result Date: 12/9/2024  CONCLUSION:  1. There are dilated and fluid-filled loops of small bowel without clear transition point to suggest mechanical bowel obstruction. Differential diagnostic considerations include underlying infectious/inflammatory enteritis, malabsorption, or early, developing bowel obstruction.   2. Extensive uncomplicated distal colonic diverticulosis.  3. The urinary bladder remains distended despite placement of a Webb catheter.  4. Hepatomegaly with underlying hepatic steatosis.  5. Mild anasarca.  6. Partially visualized postthoracotomy chest demonstrating small pleural effusions and associated basilar atelectasis, with or without superimposed pneumonia.  7. Additional branching opacities are evident and may reflect small airways disease.   8. Low-density appearance of the intracardiac blood pool raises the possibility of underlying anemia. Correlate with hematologic parameters.  9. Lesser incidental findings as above.    Dictated by (CST): Eulalio Shaikh MD on 12/09/2024 at 6:19 PM     Finalized by (CST): Eulalio Shaikh MD on 12/09/2024 at 6:31 PM                    Exam:     Physical Exam:  General: awake/alert  HEENT: +trach  Neck: No JVD, carotids 2+, no bruits.  Cardiac: Regular rate and rhythm. S1, S2 normal.   Lungs: +rhonchi   Abdomen: Soft, non-tender.BS-present.  Extremities: Without clubbing or cyanosis. + BLE edema.    Neurologic: unable to  obtain  Skin: Warm and dry.     Edward Montgomery, [facility] [place]

## 2024-12-12 NOTE — PROGRESS NOTES
[facility]     Gastroenterology Progress Note    Alisha Wilde Patient Status:  Inpatient    10/25/1965 MRN [id number]   Location [facility] 2W/SW Attending Missy Paulson MD   Hosp Day # 20 PCP Bridger Avendano MD       Subjective:   D/w RN output from PEG overnight slowing down.   Objective:   Blood pressure (!) 175/68, pulse 93, temperature 100.2 °F (37.9 °C), temperature source Axillary, resp. rate 26, height 5' 5\" (1.651 m), weight 235 lb 7.2 oz (106.8 kg), SpO2 98%, not currently breastfeeding. Body mass index is 39.18 kg/m².    Gen: sedated, NAD  HEENT: mucus membranes appear moist, trach to vent  CV: RRR  Lung: no conversational dyspnea   Abdomen: soft NT, less distended today with hypoactive BS appreciated, peg luq  Skin: dry, warm, no jaundice  Ext: +edema  Neuro: sedated    Assessment and Plan:   Alisha Wilde is a 59 year old female w/ PMHx of hypertension, GERD, CKD, hyperlipidemia who presented to ER 2024 for chest pain. Underwent emergent repair of aortic type A dissection on 2024. Inability to wean from the ventillator now s/p trach and PEG placement -.  GI reconsulted for high residual TF.     # Small bowel dilation, likely ileus less likely evolving SBO  -s/p PEG   -High TF residuals since onset  -CT abdomen pelvis with dilated fluid-filled loops of small bowel, no transition point  -output from PEG appears to have slowed down today  -Repeat KUB today  -continue to hold tube feeds  -Avoid dysmotility agents    D/w nursing    Toma Barrientos MD      Results:     Lab Results   Component Value Date    WBC 6.5 2024    HGB 8.3 (L) 2024    HCT 25.3 (L) 2024    .0 2024    CREATSERUM 1.44 (H) 2024    BUN 33 (H) 2024     (H) 2024    K 3.2 (L) 2024    K 3.2 (L) 2024     (H) 2024    CO2 19.0 (L) 2024     (H) 2024    CA 8.8 2024    ALB 3.7 2024    ALKPHO  101 12/09/2024    BILT 0.3 12/09/2024    TP 6.7 12/09/2024    AST 24 12/09/2024    ALT 25 12/09/2024    PTT 30.7 12/04/2024    INR 1.17 12/04/2024    TSH 2.085 12/07/2024    NATHALIE 99 11/25/2024    LIP 28 11/25/2024    DDIMER 0.42 12/08/2019    ESRML 15 06/05/2021    MG 2.4 12/12/2024    PHOS 3.1 12/12/2024    TROP <0.045 12/08/2019     (H) 09/23/2021    B12 1,156 (H) 07/23/2018       XR CHEST AP PORTABLE  (CPT=71045)    Result Date: 12/12/2024  CONCLUSION:  1. Cardiomegaly.  Tortuous aorta. 2. Bilateral mixed multifocal airspace consolidation with slight improved aeration left lung base. 3. Tracheostomy tube overlies the tracheal air column.  Right PICC line with the tip in the SVC.     Dictated by (CST): Woodrow Fischer MD on 12/12/2024 at 11:28 AM     Finalized by (CST): Woodrow Fischer MD on 12/12/2024 at 11:31 AM          CT ABDOMEN+PELVIS(CPT=74176)    Result Date: 12/10/2024  CONCLUSION:  1. Fluid-filled loops of small bowel, which could reflect underlying infectious/inflammatory enteritis or malabsorption in the appropriate clinical setting. Bowel loops are mildly dilated without clear transition point to suggest mechanical bowel obstruction, although early or partial bowel obstruction could have a similar appearance.  2. Extensive distal colonic diverticulosis without CT evidence of acute complication.   3. Bladder distension despite Webb catheter placement.  4. Hepatomegaly with hepatic steatosis.  5. Diffuse anasarca.  6. Partially visualized postthoracotomy chest with possible postoperative seroma/hematoma of the anterior chest wall.  7. Bilateral pleural effusions and associated basilar atelectasis, with or without superimposed pneumonia.  8. Additional scattered patchy nodular opacities may reflect infectious process.   9. Low-density appearance of the intracardiac blood pool raises the possibility of underlying anemia. Correlate with hematologic parameters.  10. Lesser incidental findings  as above.    Dictated by (CST): Eulalio Shaikh MD on 12/10/2024 at 6:12 PM     Finalized by (CST): Eulalio Shaikh MD on 12/10/2024 at 6:21 PM         EKG 12 Lead    Result Date: 12/12/2024  Normal sinus rhythm Nonspecific T wave abnormality Abnormal ECG When compared with ECG of 23-NOV-2024 04:39, ST now depressed in Inferior leads Nonspecific T wave abnormality, worse in Inferior leads Nonspecific T wave abnormality now evident in Anterior-lateral leads

## 2024-12-12 NOTE — PROGRESS NOTES
[facility]  part of [facility]    Progress Note      Subjective:     Peg tube to gravity and total output 1650 in 24 hrs. Remain intubated and sedated.     On nicardipine gtt     Review of Systems:     Unable to obtain    Objective:   Temp:  [99 °F (37.2 °C)-100.9 °F (38.3 °C)] 100.1 °F (37.8 °C)  Pulse:  [60-93] 86  Resp:  [16-27] 23  BP: (125-195)/(63-76) 171/75  SpO2:  [100 %] 100 %  FiO2 (%):  [30 %] 30 %  SpO2: 100 %     Intake/Output Summary (Last 24 hours) at 12/11/2024 1911  Last data filed at 12/11/2024 1800  Gross per 24 hour   Intake 4916.17 ml   Output 6200 ml   Net -1283.83 ml     Wt Readings from Last 3 Encounters:   12/11/24 287 lb 14.7 oz (130.6 kg)   10/24/24 254 lb (115.2 kg)   05/17/24 249 lb (112.9 kg)       General appearance: Intubated -sedated  Head: Normocephalic, atraumatic  Neck:  no JVD, supple, symmetrical  Skin: No rashes or lesions  Neurologic: unable to examine       Medications:  Current Facility-Administered Medications   Medication Dose Route Frequency    fentaNYL (Sublimaze) 50 mcg/mL injection 25 mcg  25 mcg Intravenous Q2H PRN    fentaNYL (Sublimaze) 50 mcg/mL injection 50 mcg  50 mcg Intravenous Q2H PRN    bisacodyl (Dulcolax) 10 MG rectal suppository 10 mg  10 mg Rectal Daily PRN    dexmedeTOMIDine (Precedex) 800 mcg in sodium chloride 0.9% 100 mL infusion  0.2-1.5 mcg/kg/hr (Dosing Weight) Intravenous Continuous    acetaminophen (Ofirmev) 10 mg/mL infusion premix 1,000 mg  1,000 mg Intravenous Q6H PRN    cloNIDine (Catapres) 0.3 MG/24HR patch 1 patch  1 patch Transdermal Weekly    metoprolol (Lopressor) 5 mg/5mL injection 5 mg  5 mg Intravenous Q6H    sodium chloride 3 % nebulizer solution 3 mL  3 mL Nebulization PRN    albuterol (Ventolin) (2.5 MG/3ML) 0.083% nebulizer solution 2.5 mg  2.5 mg Nebulization Q6H PRN    piperacillin-tazobactam (Zosyn) 3.375 g in dextrose 5% 100 mL IVPB-ADDV  3.375 g Intravenous Q8H    vancomycin (Vancocin) 1.75 g in sodium  chloride 0.9% 500mL IVPB premix  12.5 mg/kg Intravenous Q24H    hydrALAzine (Apresoline) 20 mg/mL injection 10 mg  10 mg Intravenous Q4H PRN    LORazepam (Ativan) tab 1 mg  1 mg Per G Tube TID    carvedilol (Coreg) tab 37.5 mg  37.5 mg Oral BID with meals    acetaminophen (Tylenol) 160 MG/5ML oral liquid 650 mg  650 mg Oral Q6H PRN    aspirin chewable tab 81 mg  81 mg Peg Tube Daily    QUEtiapine (SEROquel) tab 50 mg  50 mg Per G Tube BID    niCARdipine (carDENE) 125 mg in sodium chloride 0.9% 250 mL infusion  5-15 mg/hr Intravenous Continuous    isosorbide dinitrate (Isordil) tab 40 mg  40 mg Per NG Tube TID (Nitrates)    hydrALAZINE (Apresoline) tab 150 mg  150 mg Oral Q8H GABRIEL    amLODIPine (Norvasc) tab 10 mg  10 mg Oral Daily    ipratropium-albuterol (Duoneb) 0.5-2.5 (3) MG/3ML inhalation solution 3 mL  3 mL Nebulization Q6H PRN    HYDROcodone-acetaminophen (Norco) 5-325 MG per tab 2 tablet  2 tablet Oral Q4H PRN    heparin (Porcine) 5000 UNIT/ML injection 5,000 Units  5,000 Units Subcutaneous Q8H GABRIEL    melatonin tab 3 mg  3 mg Oral Nightly PRN    potassium chloride 20 mEq/100mL IVPB premix 20 mEq  20 mEq Intravenous PRN    Or    potassium chloride 40 mEq/100mL IVPB premix (central line) 40 mEq  40 mEq Intravenous PRN    magnesium sulfate in dextrose 5% 1 g/100mL infusion premix 1 g  1 g Intravenous PRN    magnesium sulfate in sterile water for injection 2 g/50mL IVPB premix 2 g  2 g Intravenous PRN    chlorhexidine gluconate (Peridex) 0.12 % oral solution 15 mL  15 mL Mouth/Throat BID    propofol (Diprivan) 10 mg/mL infusion premix  5-50 mcg/kg/min (Dosing Weight) Intravenous Continuous    famotidine (Pepcid) 20 mg/2mL injection 20 mg  20 mg Intravenous Daily          Results:     Recent Labs   Lab 12/07/24  0627 12/08/24  0637 12/09/24  0609 12/10/24  0607 12/11/24  0547   RBC 3.00* 3.84 3.18* 3.05* 2.94*   HGB 8.4* 10.9* 9.0* 8.7* 8.2*   HCT 26.2* 33.5* 27.7* 26.0* 25.4*   MCV 87.3 87.2 87.1 85.2 86.4    NEPRELIM 5.57 6.94 7.06  --   --    WBC 8.5 9.9 10.2 8.1 6.4   .0 270.0 165.0 313.0 369.0     Recent Labs   Lab 12/05/24  0416 12/06/24  0534 12/07/24  0627 12/08/24  0637 12/09/24  0609 12/09/24  0720 12/09/24  1453 12/10/24  0607 12/11/24  0547 12/11/24  0759   *   < > 113* 158* 132*  --  110* 143* 121*  --    BUN 41*   < > 36* 38*  --  37* 33* 40*  --  41*   CREATSERUM 1.61*   < > 1.41* 1.48* 1.51*  --  1.53* 1.70* 1.85*  --    CA 9.1   < > 8.8 8.4* 9.1  --  9.2 8.4* 8.4*  --    ALB 3.6   < > 3.3 3.8 3.7  --   --   --   --   --       < > 145 141 143  --  141 142 143  --    K 4.0   < > 3.8 5.1  5.1  --  3.9 3.9 4.5  --  3.4*   *   < > 117* 116* 115*  --  112 115* 113*  --    CO2 17.0*   < > 18.0* 18.0* 17.0*  --  28.0 17.0* 17.0*  --    ALKPHO 86  --   --   --  101  --   --   --   --   --    AST 31  --   --   --   --  24  --   --   --   --    ALT 33  --   --   --  28 25  --   --   --   --    BILT 0.4  --   --   --  0.3  --   --   --   --   --    TP 6.3  --   --   --  6.7  --   --   --   --   --     < > = values in this interval not displayed.     PTT   Date Value Ref Range Status   12/04/2024 30.7 23.0 - 36.0 seconds Final     INR   Date Value Ref Range Status   12/04/2024 1.17 0.80 - 1.20 Final     Comment:     Therapeutic INR range for patients on warfarin:  2.0-3.0 for most medical conditions and surgical prophylaxis   2.5-3.5 for mechanical heart valves and recurrent thromboembolism    Direct thrombin inhibitors (e.g. argatroban, bivalirudin), factor Xa inhibitors (e.g. apixaban, rivaroxaban), and conditions such as coagulation factor deficiency, vitamin K deficiency, and liver disease will prolong the prothrombin time/INR.    Unfractionated heparin and low molecular weight heparin do not affect the prothrombin time/INR up to a concentration of 1 IU/mL and 1.5 IU/mL, respectively.         No results for input(s): \"BNP\" in the last 168 hours.  Recent Labs   Lab 12/07/24  5458  12/08/24  0637 12/09/24  0609   MG 2.5 2.2 2.5   PHOS 4.1 4.2 4.2        Recent Labs   Lab 12/07/24  0627 12/08/24  0637 12/09/24  0609   PHOS 4.1 4.2 4.2   ALB 3.3 3.8 3.7       CT ABDOMEN+PELVIS(CPT=74176)    Result Date: 12/10/2024  CONCLUSION:  1. Fluid-filled loops of small bowel, which could reflect underlying infectious/inflammatory enteritis or malabsorption in the appropriate clinical setting. Bowel loops are mildly dilated without clear transition point to suggest mechanical bowel obstruction, although early or partial bowel obstruction could have a similar appearance.  2. Extensive distal colonic diverticulosis without CT evidence of acute complication.   3. Bladder distension despite Webb catheter placement.  4. Hepatomegaly with hepatic steatosis.  5. Diffuse anasarca.  6. Partially visualized postthoracotomy chest with possible postoperative seroma/hematoma of the anterior chest wall.  7. Bilateral pleural effusions and associated basilar atelectasis, with or without superimposed pneumonia.  8. Additional scattered patchy nodular opacities may reflect infectious process.   9. Low-density appearance of the intracardiac blood pool raises the possibility of underlying anemia. Correlate with hematologic parameters.  10. Lesser incidental findings as above.    Dictated by (CST): Eulalio Shaikh MD on 12/10/2024 at 6:12 PM     Finalized by (CST): Eulalio Shaikh MD on 12/10/2024 at 6:21 PM               Assessment and Plan:       1.NAIMA on CKD stage III: Nonoliguric on diuretic  NAIMA secondary to MARY LOU/ATN.  creatinine currently mostly at 1.4 1.5 mg/dL .   Kidney function worsening with Cr 1.7 ->1.85 mg/dl today   Was on IV furosemide 40 mg twice daily-changed to furosemide 10 mg/h given volume overload - was taken off of it on 12/10   Will monitor off lasix today given high 24 hr output from peg tube    2.aortic dissection: S/p emergent aortic dissection repair  CT surgery input noted  S/p CT chest abdomen  pelvis   Has required packed red blood cell transfusions for drop in hemoglobin    3.respiratory failure:  S/p tracheostomy -unable to extubate the patient    4 -renal cyst  The renal ultrasound showed a mildly complex cyst in the right kidney.  This will need to be followed up with surveillance ultrasound    5.abdominal distention: S/p PEG 12/5  High tube feed residuals-s/p KUB bowel obstruction or ileus but large stool burden  S/p bowel regimen  Currently tube feed on hold and peg tube to suction   CTAP to rule out obstruction -- none seen. GI input noted. Plan for peg to low intermittent suction.   Kub today     6.volume overload/bilateral lower extremity edema:  Prn lasix. Off of gtt for now.     7.hypertension: Blood pressure well-controlled well patient is calm  On nicardipine gtt  Renal ultrasound with Doppler unremarkable for any renal artery stenosis    Discussed with nursing and CTS team    Robbi Resendiz MD

## 2024-12-12 NOTE — PLAN OF CARE
Pt off propofol, plan for precedex off and wean to IVP and prns. Much more alert. Able to mouth words and communicate. Cardene and Labetolol gtt due to PEG remaining to LIS. Daily tap water enema, pt was very impacted. Digital extraction preformed.   Problem: Patient Centered Care  Goal: Patient preferences are identified and integrated in the patient's plan of care  Description: Interventions:  - What would you like us to know as we care for you? I work at [employer] and I am still very good friends with people from grade school! My brother, Osbaldo Prince, has been making decisions for me.  - Provide timely, complete, and accurate information to patient/family  - Incorporate patient and family knowledge, values, beliefs, and cultural backgrounds into the planning and delivery of care  - Encourage patient/family to participate in care and decision-making at the level they choose  - Honor patient and family perspectives and choices  Outcome: Progressing     Problem: Diabetes/Glucose Control  Goal: Glucose maintained within prescribed range  Description: INTERVENTIONS:  - Monitor Blood Glucose as ordered  - Assess for signs and symptoms of hyperglycemia and hypoglycemia  - Administer ordered medications to maintain glucose within target range  - Assess barriers to adequate nutritional intake and initiate nutrition consult as needed  - Instruct patient on self management of diabetes  Outcome: Progressing     Problem: Patient/Family Goals  Goal: Patient/Family Long Term Goal  Description: Patient's Long Term Goal: To discharge from the hospital safely    Interventions:  - surgical interventions, rounding, medications  - See additional Care Plan goals for specific interventions  Outcome: Progressing  Goal: Patient/Family Short Term Goal  Description: Patient's Short Term Goal: to breathe better    Interventions:   - manage the ventilator for pt until she is able to breath on her own.  - See additional Care Plan  goals for specific interventions  Outcome: Progressing     Problem: CARDIOVASCULAR - ADULT  Goal: Maintains optimal cardiac output and hemodynamic stability  Description: INTERVENTIONS:  - Monitor vital signs, rhythm, and trends  - Monitor for bleeding, hypotension and signs of decreased cardiac output  - Evaluate effectiveness of vasoactive medications to optimize hemodynamic stability  - Monitor arterial and/or venous puncture sites for bleeding and/or hematoma  - Assess quality of pulses, skin color and temperature  - Assess for signs of decreased coronary artery perfusion - ex. Angina  - Evaluate fluid balance, assess for edema, trend weights  Outcome: Progressing  Goal: Absence of cardiac arrhythmias or at baseline  Description: INTERVENTIONS:  - Continuous cardiac monitoring, monitor vital signs, obtain 12 lead EKG if indicated  - Evaluate effectiveness of antiarrhythmic and heart rate control medications as ordered  - Initiate emergency measures for life threatening arrhythmias  - Monitor electrolytes and administer replacement therapy as ordered  Outcome: Progressing     Problem: RESPIRATORY - ADULT  Goal: Achieves optimal ventilation and oxygenation  Description: INTERVENTIONS:  - Assess for changes in respiratory status  - Assess for changes in mentation and behavior  - Position to facilitate oxygenation and minimize respiratory effort  - Oxygen supplementation based on oxygen saturation or ABGs  - Provide Smoking Cessation handout, if applicable  - Encourage broncho-pulmonary hygiene including cough, deep breathe, Incentive Spirometry  - Assess the need for suctioning and perform as needed  - Assess and instruct to report SOB or any respiratory difficulty  - Respiratory Therapy support as indicated  - Manage/alleviate anxiety  - Monitor for signs/symptoms of CO2 retention  Outcome: Progressing     Problem: GASTROINTESTINAL - ADULT  Goal: Minimal or absence of nausea and vomiting  Description:  INTERVENTIONS:  - Maintain adequate hydration with IV or PO as ordered and tolerated  - Nasogastric tube to low intermittent suction as ordered  - Evaluate effectiveness of ordered antiemetic medications  - Provide nonpharmacologic comfort measures as appropriate  - Advance diet as tolerated, if ordered  - Obtain nutritional consult as needed  - Evaluate fluid balance  Outcome: Progressing  Goal: Maintains or returns to baseline bowel function  Description: INTERVENTIONS:  - Assess bowel function  - Maintain adequate hydration with IV or PO as ordered and tolerated  - Evaluate effectiveness of GI medications  - Encourage mobilization and activity  - Obtain nutritional consult as needed  - Establish a toileting routine/schedule  - Consider collaborating with pharmacy to review patient's medication profile  Outcome: Progressing     Problem: METABOLIC/FLUID AND ELECTROLYTES - ADULT  Goal: Glucose maintained within prescribed range  Description: INTERVENTIONS:  - Monitor Blood Glucose as ordered  - Assess for signs and symptoms of hyperglycemia and hypoglycemia  - Administer ordered medications to maintain glucose within target range  - Assess barriers to adequate nutritional intake and initiate nutrition consult as needed  - Instruct patient on self management of diabetes  Outcome: Progressing  Goal: Electrolytes maintained within normal limits  Description: INTERVENTIONS:  - Monitor labs and rhythm and assess patient for signs and symptoms of electrolyte imbalances  - Administer electrolyte replacement as ordered  - Monitor response to electrolyte replacements, including rhythm and repeat lab results as appropriate  - Fluid restriction as ordered  - Instruct patient on fluid and nutrition restrictions as appropriate  Outcome: Progressing  Goal: Hemodynamic stability and optimal renal function maintained  Description: INTERVENTIONS:  - Monitor labs and assess for signs and symptoms of volume excess or deficit  -  Monitor intake, output and patient weight  - Monitor urine specific gravity, serum osmolarity and serum sodium as indicated or ordered  - Monitor response to interventions for patient's volume status, including labs, urine output, blood pressure (other measures as available)  - Encourage oral intake as appropriate  - Instruct patient on fluid and nutrition restrictions as appropriate  Outcome: Progressing     Problem: SKIN/TISSUE INTEGRITY - ADULT  Goal: Skin integrity remains intact  Description: INTERVENTIONS  - Assess and document risk factors for pressure ulcer development  - Assess and document skin integrity  - Monitor for areas of redness and/or skin breakdown  - Initiate interventions, skin care algorithm/standards of care as needed  Outcome: Progressing  Goal: Incision(s), wounds(s) or drain site(s) healing without S/S of infection  Description: INTERVENTIONS:  - Assess and document risk factors for pressure ulcer development  - Assess and document skin integrity  - Assess and document dressing/incision, wound bed, drain sites and surrounding tissue  - Implement wound care per orders  - Initiate isolation precautions as appropriate  - Initiate Pressure Ulcer prevention bundle as indicated  Outcome: Progressing  Goal: Oral mucous membranes remain intact  Description: INTERVENTIONS  - Assess oral mucosa and hygiene practices  - Implement preventative oral hygiene regimen  - Implement oral medicated treatments as ordered  Outcome: Progressing     Problem: HEMATOLOGIC - ADULT  Goal: Maintains hematologic stability  Description: INTERVENTIONS  - Assess for signs and symptoms of bleeding or hemorrhage  - Monitor labs and vital signs for trends  - Administer supportive blood products/factors, fluids and medications as ordered and appropriate  - Administer supportive blood products/factors as ordered and appropriate  Outcome: Progressing     Problem: MUSCULOSKELETAL - ADULT  Goal: Return mobility to safest level of  function  Description: INTERVENTIONS:  - Assess patient stability and activity tolerance for standing, transferring and ambulating w/ or w/o assistive devices  - Assist with transfers and ambulation using safe patient handling equipment as needed  - Ensure adequate protection for wounds/incisions during mobilization  - Obtain PT/OT consults as needed  - Advance activity as appropriate  - Communicate ordered activity level and limitations with patient/family  Outcome: Progressing  Goal: Maintain proper alignment of affected body part  Description: INTERVENTIONS:  - Support and protect limb and body alignment per provider's orders  - Instruct and reinforce with patient and family use of appropriate assistive device and precautions (e.g. spinal or hip dislocation precautions)  Outcome: Progressing     Problem: NEUROLOGICAL - ADULT  Goal: Achieves stable or improved neurological status  Description: INTERVENTIONS  - Assess for and report changes in neurological status  - Initiate measures to prevent increased intracranial pressure  - Maintain blood pressure and fluid volume within ordered parameters to optimize cerebral perfusion and minimize risk of hemorrhage  - Monitor temperature, glucose, and sodium. Initiate appropriate interventions as ordered  Outcome: Progressing     Problem: PAIN - ADULT  Goal: Verbalizes/displays adequate comfort level or patient's stated pain goal  Description: INTERVENTIONS:  - Encourage pt to monitor pain and request assistance  - Assess pain using appropriate pain scale  - Administer analgesics based on type and severity of pain and evaluate response  - Implement non-pharmacological measures as appropriate and evaluate response  - Consider cultural and social influences on pain and pain management  - Manage/alleviate anxiety  - Utilize distraction and/or relaxation techniques  - Monitor for opioid side effects  - Notify MD/LIP if interventions unsuccessful or patient reports new pain  -  Anticipate increased pain with activity and pre-medicate as appropriate  Outcome: Progressing     Problem: Delirium  Goal: Minimize duration of delirium  Description: Interventions:  - Encourage use of hearing aids, eye glasses  - Promote highest level of mobility daily  - Provide frequent reorientation  - Promote wakefulness i.e. lights on, blinds open  - Promote sleep, encourage patient's normal rest cycle i.e. lights off, TV off, minimize noise and interruptions  - Encourage family to assist in orientation and promotion of home routines  Outcome: Progressing

## 2024-12-12 NOTE — PROGRESS NOTES
Pulmonary/ICU/Critical Care Progress Note        Reason for Consultation: post op care  Referring Physician: Dr. Gregg    Seen this am.     SUBJECTIVE:  Afebrile  Starting labetolol on top of her maxed nicardipine.  On precedex and PRN fentanyl.  PEG tube to LIS.       ALLERGIES:  Allergies[1]        MEDS:  Home Medications:  Medications Taking[2]    Scheduled Medication:   potassium chloride  40 mEq Intravenous Once    cloNIDine  1 patch Transdermal Weekly    metoprolol  5 mg Intravenous Q6H    piperacillin-tazobactam  3.375 g Intravenous Q8H    vancomycin  12.5 mg/kg Intravenous Q24H    LORazepam  1 mg Per G Tube TID    carvedilol  37.5 mg Oral BID with meals    aspirin  81 mg Peg Tube Daily    QUEtiapine  50 mg Per G Tube BID    isosorbide dinitrate  40 mg Per NG Tube TID (Nitrates)    hydrALAZINE  150 mg Oral Q8H GABRIEL    amLODIPine  10 mg Oral Daily    heparin  5,000 Units Subcutaneous Q8H GABRIEL    chlorhexidine gluconate  15 mL Mouth/Throat BID    famotidine  20 mg Intravenous Daily     Continuous Infusing Medication:   labetalol (Trandate) 200 mg in sodium chloride 0.9% 200 mL infusion      dexmedetomidine 1.5 mcg/kg/hr (12/12/24 0649)    niCARdipine 15 mg/hr (12/12/24 0051)    propofol Stopped (12/12/24 0844)     PRN Medications:    fentaNYL    fentaNYL    bisacodyl    acetaminophen    sodium chloride    albuterol    hydrALAzine    acetaminophen    ipratropium-albuterol    HYDROcodone-acetaminophen    melatonin    potassium chloride **OR** potassium chloride    magnesium sulfate in dextrose 5%    magnesium sulfate in sterile water for injection       PHYSICAL EXAM:  /65 (BP Location: Left arm)   Pulse 75   Temp 99.3 °F (37.4 °C) (Oral)   Resp 22   Ht 5' 5\" (1.651 m)   Wt 235 lb 7.2 oz (106.8 kg)   SpO2 99%   BMI 39.18 kg/m²   Vent Mode: PRVC/AC  FiO2 (%):  [30 %] 30 %  S RR:  [18] 18  S VT:  [550 mL] 550 mL  PEEP/CPAP (cm H2O):  [5 cm H20] 5 cm H20  MAP (cm H2O):  [12-16] 14  CONSTITUTIONAL:  intubated, sedated but opens eyes  HEENT: atraumatic normocephalic  MOUTH: MMM, large tongue  NECK/THROAT: no JVD. Trachea midline. No obvious thyromegaly. + trach with min bleeding   LUNG: vented upper clear BS b/l no wheezing, + crackles. Diminished at bases. Chest symmetric with respiratory motion. + midsternal surgical wound healing   HEART: regular rate and rhythm, no obvious murmers or gallops noted  ABD: soft distended, less tender today. + hypoactive bowel sounds. No organomegaly noted. + PEG tube to LIS  EXT: no clubbing, cyanosis, or edema noted. Pulses intact grossly  NEURO/MUSCULOSKELETAL: intubated sedated but opens eyes   SKIN: warm, dry. No obvious lesions noted  LYMPH: no obvious LAD      IMAGES:   CT A/P 12/10/24  CONCLUSION:   1. Fluid-filled loops of small bowel, which could reflect underlying infectious/inflammatory enteritis or malabsorption in the appropriate clinical setting. Bowel loops are mildly dilated without clear transition point to suggest mechanical bowel   obstruction, although early or partial bowel obstruction could have a similar appearance.   2. Extensive distal colonic diverticulosis without CT evidence of acute complication.    3. Bladder distension despite Webb catheter placement.   4. Hepatomegaly with hepatic steatosis.   5. Diffuse anasarca.   6. Partially visualized postthoracotomy chest with possible postoperative seroma/hematoma of the anterior chest wall.   7. Bilateral pleural effusions and associated basilar atelectasis, with or without superimposed pneumonia.   8. Additional scattered patchy nodular opacities may reflect infectious process.    9. Low-density appearance of the intracardiac blood pool raises the possibility of underlying anemia. Correlate with hematologic parameters.   10. Lesser incidental findings as above.     CXR 12/9/24  CONCLUSION:   1. Cardiomegaly.  Atherosclerotic aneurysmal thoracic aorta   2. Tracheostomy tube overlies tracheal air column.    3.  Bilateral mixed alveolar and interstitial multifocal airspace opacification has progressed.        KUB 12/9/24  CONCLUSION:   No huang dilatation of the small bowel to suggest small bowel obstruction.   Large cecal stool burden which may indicate constipation. Mild stool burden throughout the remainder of the colon.     CXR 12/5/24  No significant change when compared to 12/04/2024.     CXR 12/4/24  CONCLUSION:   1. Mild cardiomegaly and minimally prominent pulmonary vascularity but no huang pulmonary edema.  ET tube and NG tubes have been removed.  Tracheostomy is now noted in the trachea extending to the level the clavicles.  No pneumothorax.   2. Persistent patchy bilateral upper lobe airspace disease right more than left suggesting persistent bilateral upper lobe pneumonia.  Correlate clinically and continued follow-up is advised.     CXR 12/2/24  FINDINGS:   POSITION: The patient is semi-erect and slightly rotated to the right.   DEVICES: There is an endotracheal tube terminating approximately 3.9 cm above the jennyfer.  A large-bore right internal jugular central venous vascular access sheath has tip projecting in the SVC. An enteric tube extends caudally beyond the field of view.   CARDIAC/VASC: The cardiomediastinal silhouette is accentuated by AP technique, but likely stably enlarged. There is mild tortuosity of the thoracic aorta with peripheral atherosclerotic vascular calcification. The pulmonary vascularity is within normal   limits.   MEDIAST/HAMLET: Surgical clips are present.   LUNGS/PLEURA: Elevation right hemidiaphragm is noted. Multifocal patchy airspace consolidation is demonstrated, slightly worse on the right side than left. Mild interstitial opacities are seen. Additional scattered reticular opacities may be atelectatic   in nature. No large pleural effusion or pneumothorax is evident.    BONES: Median sternotomy wires are demonstrated. Mild degenerative changes of the thoracic spine are  apparent. There is no fracture or visible bony lesion.   OTHER: There may be surgical clips at the level of the thoracic inlet. Leads overlie the chest and obscure underlying detail     CXR 11/27/24  Moderate pulmonary edema, progressed since 11/26/2024.     CT c/a/p  CONCLUSION:  Low-lying ETT, 0.8 cm above the jennyfer   Multifocal bilateral pulmonary nodular consolidation s    CXR 11/24/24  FINDINGS:   CARDIAC/VASC: The cardiac silhouette is exaggerated by AP portable technique. There is stable central pulmonary venous congestion.   MEDIAST/HAMLET:   No visible mass or adenopathy.   LUNGS/PLEURA: There are stable streaky opacities at the right lung base.  No pleural effusion or pneumothorax.   BONES: Sternotomy changes are again noted.   OTHER: An endotracheal tube tip projects 3.8 cm above the jennyfer.  An enteric tube again courses into the left upper quadrant.  A right internal jugular approach Parker-Dev catheter tip again projects over the pulmonary outflow tract.  A mediastinal drain tip again projects over the right suprahilar region.     CXR 11/23/24  On my read possible RML infiltrates  CONCLUSION: Post emergent acute type A aortic dissection repair.  Stable cardiomegaly.  Gross stable/satisfactory position of lines and tubes.  Mild pulmonary vascular congestion.       CXR 11/22/24  CONCLUSION: Post feeding tube placement with tip in the distal esophagus approximately 4 cm above the gastroesophageal junction.  Recommend advancement of the tube by approximately 10 cm to place it in the stomach.     CXR 11/22/24  CARDIAC/MEDIASTINUM: The cardiac silhouette is enlarged and unchanged.. There is a right internal jugular Parker-Dev catheter with tip at the level of the main pulmonary artery. There is a right-sided chest tube. Endotracheal tube with tip approximately    5.3 cm above the jennyfer. There is a nasogastric tube with tip and sidehole within the stomach and below the diaphragm. There are median sternotomy  wires and postoperative changes of ascending aortic repair.   LUNGS: There is pulmonary vascular redistribution without edema. Minimal blunting of the bilateral costophrenic angles may be secondary to trace pleural effusions versus chronic pleural thickening. There is mild bibasilar atelectasis. No pneumothorax.   BONES: There is degenerative disease of the thoracic spine.     Chest CTA 11/22/24  CONCLUSION:   1. Type a aortic dissection beginning just above right renal (correction:  Right coronary)  artery and extending distally into the left common iliac artery and external iliac.  Findings were called to Dr. Echavarria at 5:52 p.m.       LABS:  Recent Labs   Lab 12/08/24  0637 12/09/24  0609 12/10/24  0607 12/11/24  0547 12/12/24  0443   RBC 3.84 3.18* 3.05* 2.94* 2.95*   HGB 10.9* 9.0* 8.7* 8.2* 8.3*   HCT 33.5* 27.7* 26.0* 25.4* 25.3*   MCV 87.2 87.1 85.2 86.4 85.8   MCH 28.4 28.3 28.5 27.9 28.1   MCHC 32.5 32.5 33.5 32.3 32.8   RDW 17.9* 17.3* 17.3* 17.4* 16.8*   NEPRELIM 6.94 7.06  --   --  3.64   WBC 9.9 10.2 8.1 6.4 6.5   .0 165.0 313.0 369.0 275.0       Recent Labs   Lab 12/07/24  0627 12/08/24  0637 12/09/24  0609 12/09/24  0720 12/09/24  1453 12/10/24  0607 12/11/24  0547 12/11/24  0759 12/12/24  0443   * 158* 132*  --    < > 143* 121*  --  122*   BUN 36* 38*  --  37*   < > 40*  --  41* 33*   CREATSERUM 1.41* 1.48* 1.51*  --    < > 1.70* 1.85*  --  1.44*   EGFRCR 42* 40* 39*  --    < > 34* 31*  --  41*   CA 8.8 8.4* 9.1  --    < > 8.4* 8.4*  --  8.8   ALB 3.3 3.8 3.7  --   --   --   --   --   --     141 143  --    < > 142 143  --  146*   K 3.8 5.1  5.1  --  3.9   < > 4.5  --  3.4* 3.2*  3.2*   * 116* 115*  --    < > 115* 113*  --  116*   CO2 18.0* 18.0* 17.0*  --    < > 17.0* 17.0*  --  19.0*   ALKPHO  --   --  101  --   --   --   --   --   --    AST  --   --   --  24  --   --   --   --   --    ALT  --   --  28 25  --   --   --   --   --    BILT  --   --  0.3  --   --   --   --    --   --    TP  --   --  6.7  --   --   --   --   --   --     < > = values in this interval not displayed.       ASSESSMENT/PLAN:  Type A aortic dissection s/p repair now intubated in ICU  -on nicardipine with plans to start labetolol  -multiple oral antiHTN meds for BP being held d/t ileus  -s/p pRBC transfusion on 12/4 with repeat hg stable    Post op acute respiratory failure  -complicated by extubation and reimergent intubation and significant emesis concerning for aspiration PNA vs pneumonitis  -started on zosyn-completed 10 day course  -checked ETT asp-candida likely contaminant  -failed multiple SBTs d/t increased RR, work of breathing, hypertension, hypoxemia, significant thick secretions  -hx of likely TANYA and previous smoking hx. Has sign dense consolidations and atelectasis on chest CT.  -s/p trach by ENT on 12/4/24 and PEG by GI on 12/5/24  -start trach/vent weaning as able-limited by sedation needs, BP, and GI issues  -PRN ABG and CXR  -saline nebs  -on propofol and precedex-having difficulty weaning as pt becomes agitated and hypertensive. Started seroquel 50 mg BID but holding since can't use PEG tube. Started fentanyl but this will contribute to ileus  -recommend psych consult for assistance  -continue scheduled clonidine in attempt to wean off precedex  -restarted on vanco/zosyn for aspiration PNA. Continue for now. Check ETT asp and MRSA. If neg, then stop vanco    Previous hx of smoking and ETOH  -continue duonebs PRN    NAIMA on CKD and metabolic acidosis  -likely from surgery, and acute issues  -monitor UO and renal labs  -renal following   -s/p aguilar change with 1600 ml UO  -diuresis as per cardiology and renal    Abd pain  -s/p abd CT as above  -not tolerating TF now  -PEG tube to LIS  -GI and surgery consulted    Proph:  -DVT: hep sq    Dispo:  -full code  -SW/ to assist with LTAC placement  -spoke with CV surgery service    Critical care time 31 min independent of  procedures      Thank you for the opportunity to care for Alisha Wilde.      SIDDHARTH Rainey DO, MPH  Pulmonary Critical Care Medicine  [facility]                       [1]   Allergies  Allergen Reactions    Atorvastatin MYALGIA    Pravastatin MYALGIA    Rosuvastatin MYALGIA    Seasonal Runny nose   [2]   Outpatient Medications Marked as Taking for the 11/21/24 encounter (Hospital Encounter)   Medication Sig Dispense Refill    Acetaminophen ER (TYLENOL 8 HOUR) 650 MG Oral Tab CR Take 2 tablets (1,300 mg total) by mouth daily as needed.      Calcium Carb-Cholecalciferol 600-10 MG-MCG Oral Tab Take 1 tablet by mouth daily.      metoprolol succinate ER 50 MG Oral Tablet 24 Hr Take 1 tablet (50 mg total) by mouth daily. 90 tablet 3    Losartan Potassium-HCTZ 100-12.5 MG Oral Tab Take 1 tablet by mouth daily. 90 tablet 3    Potassium Chloride ER 20 MEQ Oral Tab CR Take 1 tablet by mouth daily. 90 tablet 3    albuterol 108 (90 Base) MCG/ACT Inhalation Aero Soln Inhale 2 puffs into the lungs every 4 (four) hours as needed for Wheezing. 3 each 3    amLODIPine 10 MG Oral Tab Take 1 tablet (10 mg total) by mouth daily. 90 tablet 3    Ascorbic Acid (VITAMIN C) 1000 MG Oral Tab Take 1 tablet (1,000 mg total) by mouth daily.      vitamin B-12 50 MCG Oral Tab Take 1 tablet (50 mcg total) by mouth daily.

## 2024-12-12 NOTE — PROGRESS NOTES
Progress Note     Alisha Wilde Patient Status:  Inpatient    10/25/1965 MRN [id number]   Location [facility] 2W/SW Attending Radha Tidwell MD   Hosp Day # 20 PCP Bridger Avendano MD     Chief Complaint: patient presented with   Chief Complaint   Patient presents with    Chest Pain Angina       Subjective:   S: : trach, plan for possible NG tube by GI , PEG with stool therefore to LIS, weaning sedation   : pt is off propofol, on Precedex, continue to have ileus     Review of Systems:   10 point ROS completed and was negative, except for pertinent positive and negatives stated in subjective.    Objective:   Vital signs:  Temp:  [99 °F (37.2 °C)-100.3 °F (37.9 °C)] 99.3 °F (37.4 °C)  Pulse:  [75-99] 75  Resp:  [16-27] 22  BP: (145-171)/(61-93) 149/65  SpO2:  [88 %-100 %] 99 %  FiO2 (%):  [30 %] 30 %    Wt Readings from Last 6 Encounters:   24 235 lb 7.2 oz (106.8 kg)   10/24/24 254 lb (115.2 kg)   24 249 lb (112.9 kg)   24 249 lb (112.9 kg)   23 249 lb (112.9 kg)   10/09/23 248 lb (112.5 kg)         Physical Exam:    General: No acute distress. , Tracheostomy in place,        Respiratory: Clear to auscultation bilaterally. No wheezes. No rhonchi.  Cardiovascular: S1, S2. Regular rate and rhythm. No murmurs, rubs or gallops.   Abdomen: Soft, nontender, nondistended.  Positive bowel sounds. No rebound or guarding.  Neurologic: No focal neurological deficits.   Musculoskeletal: Moves all extremities.  Extremities: No edema.    Results:   Diagnostic Data:      Labs:    Labs Last 24 Hours:   BMP     CBC    Other     Na 146 Cl 116 BUN 33 Glu 122   Hb 8.3   PTT - Procal -   K 3.2; 3.2 CO2 19.0 Cr 1.44   WBC 6.5 >< .0  INR - CRP -   Renal Lytes Endo    Hct 25.3   Trop - D dim -   eGFR - Ca 8.8 POC Gluc  137    LFT   pBNP - Lactic -   eGFR AA - PO4 - A1c -   AST - APk - Prot -  LDL -     Mg - TSH -   ALT - T miranda - Alb -        COVID-19 Lab Results    COVID-19  Lab Results    Component Value Date    COVID19 Not Detected 11/21/2024    COVID19 Not Detected 08/10/2023    COVID19 Not Detected 05/13/2022       Pro-Calcitonin  No results for input(s): \"PCT\" in the last 168 hours.    Cardiac  No results for input(s): \"TROP\", \"PBNP\" in the last 168 hours.    Creatinine Kinase  No results for input(s): \"CK\" in the last 168 hours.    Inflammatory Markers  No results for input(s): \"CRP\", \"NATALIA\", \"LDH\", \"DDIMER\" in the last 168 hours.    Imaging: Imaging data reviewed in Epic.    IMAGES:   CT A/P 12/10/24  CONCLUSION:   1. Fluid-filled loops of small bowel, which could reflect underlying infectious/inflammatory enteritis or malabsorption in the appropriate clinical setting. Bowel loops are mildly dilated without clear transition point to suggest mechanical bowel   obstruction, although early or partial bowel obstruction could have a similar appearance.   2. Extensive distal colonic diverticulosis without CT evidence of acute complication.    3. Bladder distension despite Webb catheter placement.   4. Hepatomegaly with hepatic steatosis.   5. Diffuse anasarca.   6. Partially visualized postthoracotomy chest with possible postoperative seroma/hematoma of the anterior chest wall.   7. Bilateral pleural effusions and associated basilar atelectasis, with or without superimposed pneumonia.   8. Additional scattered patchy nodular opacities may reflect infectious process.    9. Low-density appearance of the intracardiac blood pool raises the possibility of underlying anemia. Correlate with hematologic parameters.   10. Lesser incidental findings as above.      CXR 12/9/24  CONCLUSION:   1. Cardiomegaly.  Atherosclerotic aneurysmal thoracic aorta   2. Tracheostomy tube overlies tracheal air column.   3.  Bilateral mixed alveolar and interstitial multifocal airspace opacification has progressed.        KUB 12/9/24  CONCLUSION:   No huang dilatation of the small bowel to suggest small bowel obstruction.    Large cecal stool burden which may indicate constipation.  Mild stool burden throughout the remainder of the colon.      CXR 12/5/24  No significant change when compared to 12/04/2024.      CXR 12/4/24  CONCLUSION:   1. Mild cardiomegaly and minimally prominent pulmonary vascularity but no huang pulmonary edema.  ET tube and NG tubes have been removed.  Tracheostomy is now noted in the trachea extending to the level the clavicles.  No pneumothorax.   2. Persistent patchy bilateral upper lobe airspace disease right more than left suggesting persistent bilateral upper lobe pneumonia.  Correlate clinically and continued follow-up is advised.      CXR 12/2/24  FINDINGS:   POSITION: The patient is semi-erect and slightly rotated to the right.   DEVICES: There is an endotracheal tube terminating approximately 3.9 cm above the jennyfer.  A large-bore right internal jugular central venous vascular access sheath has tip projecting in the SVC. An enteric tube extends caudally beyond the field of view.   CARDIAC/VASC: The cardiomediastinal silhouette is accentuated by AP technique, but likely stably enlarged. There is mild tortuosity of the thoracic aorta with peripheral atherosclerotic vascular calcification. The pulmonary vascularity is within normal   limits.   MEDIAST/HAMLET: Surgical clips are present.   LUNGS/PLEURA: Elevation right hemidiaphragm is noted. Multifocal patchy airspace consolidation is demonstrated, slightly worse on the right side than left. Mild interstitial opacities are seen. Additional scattered reticular opacities may be atelectatic   in nature. No large pleural effusion or pneumothorax is evident.    BONES: Median sternotomy wires are demonstrated. Mild degenerative changes of the thoracic spine are apparent. There is no fracture or visible bony lesion.   OTHER: There may be surgical clips at the level of the thoracic inlet. Leads overlie the chest and obscure underlying detail      CXR  11/27/24  Moderate pulmonary edema, progressed since 11/26/2024.      CT c/a/p  CONCLUSION:  Low-lying ETT, 0.8 cm above the jennyfer   Multifocal bilateral pulmonary nodular consolidation s     CXR 11/24/24  FINDINGS:   CARDIAC/VASC: The cardiac silhouette is exaggerated by AP portable technique. There is stable central pulmonary venous congestion.   MEDIAST/HAMLET:   No visible mass or adenopathy.   LUNGS/PLEURA: There are stable streaky opacities at the right lung base.  No pleural effusion or pneumothorax.   BONES: Sternotomy changes are again noted.   OTHER: An endotracheal tube tip projects 3.8 cm above the jennyfer.  An enteric tube again courses into the left upper quadrant.  A right internal jugular approach Hyampom-Dev catheter tip again projects over the pulmonary outflow tract.  A mediastinal drain tip again projects over the right suprahilar region.      CXR 11/23/24  On my read possible RML infiltrates  CONCLUSION: Post emergent acute type A aortic dissection repair.  Stable cardiomegaly.  Gross stable/satisfactory position of lines and tubes.  Mild pulmonary vascular congestion.       CXR 11/22/24  CONCLUSION: Post feeding tube placement with tip in the distal esophagus approximately 4 cm above the gastroesophageal junction.  Recommend advancement of the tube by approximately 10 cm to place it in the stomach.      CXR 11/22/24  CARDIAC/MEDIASTINUM: The cardiac silhouette is enlarged and unchanged.. There is a right internal jugular Hyampom-Dev catheter with tip at the level of the main pulmonary artery. There is a right-sided chest tube. Endotracheal tube with tip approximately    5.3 cm above the jennyfer. There is a nasogastric tube with tip and sidehole within the stomach and below the diaphragm. There are median sternotomy wires and postoperative changes of ascending aortic repair.   LUNGS: There is pulmonary vascular redistribution without edema. Minimal blunting of the bilateral costophrenic angles may be  secondary to trace pleural effusions versus chronic pleural thickening. There is mild bibasilar atelectasis. No pneumothorax.   BONES: There is degenerative disease of the thoracic spine.      Chest CTA 11/22/24  CONCLUSION:   1. Type a aortic dissection beginning just above right renal (correction:  Right coronary)  artery and extending distally into the left common iliac artery and external iliac.  Findings were called to Dr. Echavarria at 5:52 p.m.    Medications:    potassium chloride  40 mEq Intravenous Once    furosemide  40 mg Intravenous Once    sodium bicarbonate  50 mEq Intravenous Once    cloNIDine  1 patch Transdermal Weekly    metoprolol  5 mg Intravenous Q6H    piperacillin-tazobactam  3.375 g Intravenous Q8H    vancomycin  12.5 mg/kg Intravenous Q24H    LORazepam  1 mg Per G Tube TID    carvedilol  37.5 mg Oral BID with meals    aspirin  81 mg Peg Tube Daily    QUEtiapine  50 mg Per G Tube BID    isosorbide dinitrate  40 mg Per NG Tube TID (Nitrates)    hydrALAZINE  150 mg Oral Q8H GABRIEL    amLODIPine  10 mg Oral Daily    heparin  5,000 Units Subcutaneous Q8H GABRIEL    chlorhexidine gluconate  15 mL Mouth/Throat BID    famotidine  20 mg Intravenous Daily       Assessment & Plan:   ASSESSMENT / PLAN:     Problem List Items Addressed This Visit          HCC Problems    Dissection of ascending aorta (HCC) - Primary    Relevant Medications    sodium chloride 0.9% infusion 1,000 mL (Completed)    prothrombin complex conc (human) (Kcentra) 1,563 Units in 60 mL infusion (Completed)    furosemide (Lasix) 10 mg/mL injection 20 mg (Completed)    heparin (Porcine) 5000 UNIT/ML injection 5,000 Units    amLODIPine (Norvasc) tab 10 mg    amLODIPine (Norvasc) tab 5 mg (Completed)    sodium chloride 0.9% infusion (Completed)    hydrALAZINE (Apresoline) tab 50 mg (Completed)    furosemide (Lasix) 10 mg/mL injection 40 mg (Completed)    labetalol (Trandate) 5 mg/mL injection (Completed)    hydrALAZINE (Apresoline) tab 150 mg     isosorbide dinitrate (Isordil) tab 40 mg    sodium chloride 0.9% infusion (Completed)    furosemide (Lasix) 10 mg/mL injection 40 mg (Completed)    niCARdipine (carDENE) 125 mg in sodium chloride 0.9% 250 mL infusion    furosemide (Lasix) 10 mg/mL injection 40 mg (Completed)    cloNIDine (Catapres) tab 0.3 mg (Completed)    cloNIDine (Catapres) tab 0.3 mg (Completed)    aspirin chewable tab 81 mg    carvedilol (Coreg) tab 37.5 mg    hydrALAzine (Apresoline) 20 mg/mL injection 10 mg    dexmedeTOMIDine (Precedex) 800 mcg in sodium chloride 0.9% 100 mL infusion    furosemide (Lasix) 10 mg/mL injection 40 mg (Completed)    cloNIDine (Catapres) 0.3 MG/24HR patch 1 patch    metoprolol (Lopressor) 5 mg/5mL injection 5 mg    labetalol (Trandate) 200 mg in sodium chloride 0.9% 200 mL infusion    furosemide (Lasix) 10 mg/mL injection 40 mg     Other Visit Diagnoses       Aortic anomaly (HCC)        Relevant Medications    atropine 0.1 MG/ML injection 1 mg (Completed)    magnesium sulfate in dextrose 5% 1 g/100mL infusion premix 1 g    magnesium sulfate in sterile water for injection 2 g/50mL IVPB premix 2 g    furosemide (Lasix) 10 mg/mL injection 20 mg (Completed)    heparin (Porcine) 5000 UNIT/ML injection 5,000 Units    amLODIPine (Norvasc) tab 10 mg    amLODIPine (Norvasc) tab 5 mg (Completed)    hydrALAZINE (Apresoline) tab 50 mg (Completed)    furosemide (Lasix) 10 mg/mL injection 40 mg (Completed)    labetalol (Trandate) 5 mg/mL injection (Completed)    hydrALAZINE (Apresoline) tab 150 mg    lidocaine PF (Xylocaine-MPF) 1% injection (Completed)    isosorbide dinitrate (Isordil) tab 40 mg    furosemide (Lasix) 10 mg/mL injection 40 mg (Completed)    niCARdipine (carDENE) 125 mg in sodium chloride 0.9% 250 mL infusion    ceFAZolin (Ancef) 3 g in sodium chloride 0.9% 100mL IVPB premix (Completed)    furosemide (Lasix) 10 mg/mL injection 40 mg (Completed)    cloNIDine (Catapres) tab 0.3 mg (Completed)    cloNIDine  (Catapres) tab 0.3 mg (Completed)    aspirin chewable tab 81 mg    carvedilol (Coreg) tab 37.5 mg    vancomycin (Vancocin) 1.75 g in sodium chloride 0.9% 500mL IVPB premix    hydrALAzine (Apresoline) 20 mg/mL injection 10 mg    furosemide (Lasix) 10 mg/mL injection 40 mg (Completed)    lidocaine (cardiac) (Xylocaine) 20 mg/mL injection (Completed)    atropine 0.1 MG/ML injection (Completed)    EPINEPHrine (Adrenalin) 1 MG/10ML (0.1 MG/ML) injection (Cardiac Arrest) (Completed)    cloNIDine (Catapres) 0.3 MG/24HR patch 1 patch    metoprolol (Lopressor) 5 mg/5mL injection 5 mg    labetalol (Trandate) 200 mg in sodium chloride 0.9% 200 mL infusion    furosemide (Lasix) 10 mg/mL injection 40 mg    Other Relevant Orders    Specimen to Pathology Tissue (Completed)            58 y/o Morbid obesity, hypertension, gastroesophageal reflux disease, chronic kidney disease stage 2 to 3, and hyperlipidemia admitted on 11/21 for Chest pain -CT CT angiogram of the chest, abdomen, and pelvis rule out dissection showed type A aortic dissection s/p emergent repair 11/22/24 , transferred to ICU post op intubated on 11/22, failed SBT, s/p trach 12/4,  PEG 12/5, completed 10 days of abx zsecondary to aspiration event , no with ileus and Abx Vanc and Zosyn resumed for aspiration pneumonia           Ileus   -Gen surgery and GI following   -rpt Abd xray results pending   -PEG tube to LIS   -possibly will need NG   -Continue IV abx         Type A aortic dissection   Ileus   S/p emergent repair 11/22/24   CT chest abdomen and pelvis shows bilateral pulmonary nodular consolidation- no focal fluid collection- completed IV antibiotics- continue to monitor off of them   On IV Reglan- montior QT interval   Antihypertensives being slowly added now that PEG is in place-tube feeds held due to secretions and bowel burden  Continue bowel regimen- miralax bid, tap water enemas- GI on board   Unfortunately stool is now coming out of the peg tube, trach  collar and suctioning  Will order another CT scan to see if there is possible obstruction  Will most likely need to place NG tube- discussed with surgery and nursing   Add scopolamine patch   New picc 12/2  CV surgery, cards and critical care on consult.   TTE LVEF 75-80%.   Cont BP control per Cards  -s/p pRBC transfusion on 12/4 with repeat hg stable          - Plan is to eventually discharge to LTAC  PEG tube to low intermittent suction per GI- TFs stopped     Acute hypoxic respiratory failure   Aspiration event   Completed 10 days of zosyn.   Sputum cx candida   Failed SBT multiple times. Plans for trach tomorrow. PEG 12/5   ENT and GI on consult.  Pulmonary on consult.   CXR with multifocal airspace dz  Albuterol nebs   Status post trach placement 12/4  Status post PEG placement  12/5- resume tube feeds   Seroquel started to help with agitation 50 mg BID  On precedex and propofol- unsucessful weaning due to agitation and thick secretions   12/12:  off propofol, on precedex. Psych consulted       H/o tobacco and ETOH abuse   Duonebs PRN   CIWA protocol  NAIMA on CKD III   Renal on consult.   Paullina to be secondary to MARY LOU/ATN   Now on lasix drip to 10 mg/hour   Doppler renal ultrasound unremarkable for renal artery stenosis  If negative Renal will add minoxidil    Essential HTN   Coreg 25 mg BID, norvasc 10mg daily, hydralazine 150mg TID. Clonidinie patch 0.2mg TID   Add hydralazine combination with isosobide 150-40 mg TID   IV lasix given- had 1 liter of urine output - now changed to lasix drip   Webb in place   ARB on hold due to NAIMA.   Iron def anemia  IV iron.   Iron level low   Transfuse for Hb < 7.0   Other medical problems  Morbid Obesity   TANYA     12/12: spoke to sister Mariajose , notify her of current clinical conditions, current treatment and plan. Answered all questions.     Quality:  DVT Prophylaxis: Heparin   CODE status: FULL   DISPO: pending clinical improvement.   Estimated date of discharge: To be  determined  Discharge is dependent on: Improved clinical status  At this point Patient is expected to be discharge to: Home versus rehab      Plan of care discussed with Patient and RN.     Coordinated care with providers and counseling re: treatment plan and workup     Radha Tidwell MD    Supplementary Documentation:          I personally reviewed the available laboratories, imaging including operative report. I discussed/will discuss the case with patient and her nurse. I ordered laboratories studies. I adjusted medications including not applicable today. Medical decision making high, risk is high.     >55min spent, >50% spent counseling and coordinating care in the form of educating pt/family and d/w consultants and staff. Most of the time spent discussing the above plan.

## 2024-12-12 NOTE — CONSULTS
[facility]  part of [facility]    Report of Consultation    Alisha Wilde Patient Status:  Inpatient    10/25/1965 MRN [id number]   Location [facility] 2W/SW Attending Radha Tidwell MD   Hosp Day # 20 PCP Bridger Avendano MD     Date of Admission:  2024  Date of Consult:  24   Reason for Consultation:   Patient presented with increased confusion, restlessness, agitation, Radha Wetzel MD requested psychiatric consult for evaluation and advice.    Consult Duration     The patient seen for initial psychiatric consult evaluation.   Record reviewed, communication with attending, communication with RN and patient seen face to face evaluation.    History of Present Illness:   Patient is a 59 year old -American, single female with past medical history of CKD, hypertension, high cholesterol who was admitted to the hospital for aortic dissection. The patient has been demonstrating alternation in mood and cognition with episodes of increased restlessness and agitation. According to team, they are trying to wean patient of propofol and precedex. Patient indicated for psych consult for evaluation and advise.    Per chart review, the patient presented to the hospital by EMS on  for chest pain. Patient was found to have aortic dissection. Patient currently with trach and peg tube. She has been demonstrating agitation and restlessness.     The patient received the following psychotropic medications: clonidine 0.3 mg Patch. Patient has not been able to receive oral seroquel or ativan.  She is on precedex infusion. Propofol stopped this morning.     Labs and imaging reviewed: mag 2.4, glucose 122, sodium 146, potassium 3.2, Bun 33, creatinine 1.44    Communicating with RN, patient has episodes of agitation and restlessness. They are attempting to wean off sedation.     The patient seen today laying on hospital bed. She presents somewhat anxious and restless. Patient on  ventilator     Patient is awake, alert. She shakes her head yes and no otherwise is not able to answer questions or engage in conversation.     She shook her head no that she did not know she was in the hospital.    Provided patient with supportive psychotherapy including emotional support and encouragement.     Past Medical History  Past Medical History:    Chronic kidney disease (CKD)    Esophageal reflux    High blood pressure    High cholesterol    HYPERTENSION    Hypertension    Unspecified essential hypertension       Past Surgical History  Past Surgical History:   Procedure Laterality Date    Colonoscopy N/A 2018    Procedure: COLONOSCOPY;  Surgeon: Magdy Webber MD;  Location: [facility] ENDOSCOPY    Endometrial ablation      child passed away for 5 mins          1    Other surgical history  11    cysto-Dr. Lacey -- pt denies       Family History  Family History   Problem Relation Age of Onset    Hypertension Mother     Heart Disorder Mother 70    Other (Other) Mother         kidney failure    Hypertension Father     Hypertension Maternal Grandfather     Cancer Neg     Diabetes Neg     Glaucoma Neg     Macular degeneration Neg        Social History  Social History     Socioeconomic History    Marital status: Single   Tobacco Use    Smoking status: Former     Current packs/day: 0.00     Average packs/day: 1 pack/day for 13.0 years (13.0 ttl pk-yrs)     Types: Cigarettes     Start date:      Quit date:      Years since quittin.9    Smokeless tobacco: Former   Vaping Use    Vaping status: Never Used   Substance and Sexual Activity    Alcohol use: Yes     Alcohol/week: 1.0 - 2.0 standard drink of alcohol     Types: 1 - 2 Cans of beer per week     Comment: every day    Drug use: No     Social Drivers of Health     Food Insecurity: Unknown (2024)    Food Insecurity     Food Insecurity: Patient unable to answer   Transportation Needs: Unknown (2024)    Transportation  Needs     Lack of Transportation: Patient unable to answer   Housing Stability: Unknown (11/22/2024)    Housing Stability     Housing Instability: Patient unable to answer           Current Medications:  Current Facility-Administered Medications   Medication Dose Route Frequency    labetalol (Trandate) 200 mg in sodium chloride 0.9% 200 mL infusion  0.5-10 mg/min Intravenous Continuous    LORazepam (Ativan) 2 mg/mL injection 1 mg  1 mg Intravenous TID    LORazepam (Ativan) 2 mg/mL injection 2 mg  2 mg Intravenous Q4H PRN    haloperidol lactate (Haldol) 5 MG/ML injection 2 mg  2 mg Intravenous Q6H PRN    dextrose 10% infusion (TPN no rate)   Intravenous Continuous PRN    adult 3 in 1 TPN   Intravenous Continuous TPN    fentaNYL (Sublimaze) 50 mcg/mL injection 25 mcg  25 mcg Intravenous Q2H PRN    fentaNYL (Sublimaze) 50 mcg/mL injection 50 mcg  50 mcg Intravenous Q2H PRN    bisacodyl (Dulcolax) 10 MG rectal suppository 10 mg  10 mg Rectal Daily PRN    dexmedeTOMIDine (Precedex) 800 mcg in sodium chloride 0.9% 100 mL infusion  0.2-1.5 mcg/kg/hr (Dosing Weight) Intravenous Continuous    acetaminophen (Ofirmev) 10 mg/mL infusion premix 1,000 mg  1,000 mg Intravenous Q6H PRN    cloNIDine (Catapres) 0.3 MG/24HR patch 1 patch  1 patch Transdermal Weekly    metoprolol (Lopressor) 5 mg/5mL injection 5 mg  5 mg Intravenous Q6H    sodium chloride 3 % nebulizer solution 3 mL  3 mL Nebulization PRN    albuterol (Ventolin) (2.5 MG/3ML) 0.083% nebulizer solution 2.5 mg  2.5 mg Nebulization Q6H PRN    piperacillin-tazobactam (Zosyn) 3.375 g in dextrose 5% 100 mL IVPB-ADDV  3.375 g Intravenous Q8H    vancomycin (Vancocin) 1.75 g in sodium chloride 0.9% 500mL IVPB premix  12.5 mg/kg Intravenous Q24H    hydrALAzine (Apresoline) 20 mg/mL injection 10 mg  10 mg Intravenous Q4H PRN    carvedilol (Coreg) tab 37.5 mg  37.5 mg Oral BID with meals    acetaminophen (Tylenol) 160 MG/5ML oral liquid 650 mg  650 mg Oral Q6H PRN    aspirin  chewable tab 81 mg  81 mg Peg Tube Daily    niCARdipine (carDENE) 125 mg in sodium chloride 0.9% 250 mL infusion  5-15 mg/hr Intravenous Continuous    isosorbide dinitrate (Isordil) tab 40 mg  40 mg Per NG Tube TID (Nitrates)    hydrALAZINE (Apresoline) tab 150 mg  150 mg Oral Q8H GABRIEL    amLODIPine (Norvasc) tab 10 mg  10 mg Oral Daily    ipratropium-albuterol (Duoneb) 0.5-2.5 (3) MG/3ML inhalation solution 3 mL  3 mL Nebulization Q6H PRN    HYDROcodone-acetaminophen (Norco) 5-325 MG per tab 2 tablet  2 tablet Oral Q4H PRN    heparin (Porcine) 5000 UNIT/ML injection 5,000 Units  5,000 Units Subcutaneous Q8H GABRIEL    melatonin tab 3 mg  3 mg Oral Nightly PRN    potassium chloride 20 mEq/100mL IVPB premix 20 mEq  20 mEq Intravenous PRN    Or    potassium chloride 40 mEq/100mL IVPB premix (central line) 40 mEq  40 mEq Intravenous PRN    magnesium sulfate in dextrose 5% 1 g/100mL infusion premix 1 g  1 g Intravenous PRN    magnesium sulfate in sterile water for injection 2 g/50mL IVPB premix 2 g  2 g Intravenous PRN    chlorhexidine gluconate (Peridex) 0.12 % oral solution 15 mL  15 mL Mouth/Throat BID    propofol (Diprivan) 10 mg/mL infusion premix  5-50 mcg/kg/min (Dosing Weight) Intravenous Continuous    famotidine (Pepcid) 20 mg/2mL injection 20 mg  20 mg Intravenous Daily     Medications Prior to Admission   Medication Sig    Acetaminophen ER (TYLENOL 8 HOUR) 650 MG Oral Tab CR Take 2 tablets (1,300 mg total) by mouth daily as needed.    Calcium Carb-Cholecalciferol 600-10 MG-MCG Oral Tab Take 1 tablet by mouth daily.    metoprolol succinate ER 50 MG Oral Tablet 24 Hr Take 1 tablet (50 mg total) by mouth daily.    Losartan Potassium-HCTZ 100-12.5 MG Oral Tab Take 1 tablet by mouth daily.    Potassium Chloride ER 20 MEQ Oral Tab CR Take 1 tablet by mouth daily.    albuterol 108 (90 Base) MCG/ACT Inhalation Aero Soln Inhale 2 puffs into the lungs every 4 (four) hours as needed for Wheezing.    amLODIPine 10 MG Oral Tab  Take 1 tablet (10 mg total) by mouth daily.    Ascorbic Acid (VITAMIN C) 1000 MG Oral Tab Take 1 tablet (1,000 mg total) by mouth daily.    vitamin B-12 50 MCG Oral Tab Take 1 tablet (50 mcg total) by mouth daily.    fluticasone propionate 50 MCG/ACT Nasal Suspension 2 sprays by Nasal route daily.    fluticasone-salmeterol 250-50 MCG/ACT Inhalation Aerosol Powder, Breath Activated Inhale 1 puff into the lungs 2 (two) times daily. inhale 1 puff by INHALATION route 2 times every day morning and evening approximately 12 hours apart (Patient not taking: Reported on 11/21/2024)       Allergies  Allergies[1]    Review of Systems:   As by Admitting/Attending    Results:   Laboratory Data:  Lab Results   Component Value Date    WBC 6.5 12/12/2024    HGB 8.3 (L) 12/12/2024    HCT 25.3 (L) 12/12/2024    .0 12/12/2024    CREATSERUM 1.44 (H) 12/12/2024    BUN 33 (H) 12/12/2024     (H) 12/12/2024    K 3.2 (L) 12/12/2024    K 3.2 (L) 12/12/2024     (H) 12/12/2024    CO2 19.0 (L) 12/12/2024     (H) 12/12/2024    CA 8.8 12/12/2024    ALB 3.7 12/09/2024    ALKPHO 101 12/09/2024    TP 6.7 12/09/2024    AST 24 12/09/2024    ALT 25 12/09/2024    PTT 30.7 12/04/2024    INR 1.17 12/04/2024    PTP 15.6 (H) 12/04/2024    TSH 2.085 12/07/2024    NATHALIE 99 11/25/2024    LIP 28 11/25/2024    DDIMER 0.42 12/08/2019    ESRML 15 06/05/2021    MG 2.4 12/12/2024    PHOS 3.1 12/12/2024    TROP <0.045 12/08/2019     (H) 09/23/2021    B12 1,156 (H) 07/23/2018         Imaging:  XR CHEST AP PORTABLE  (CPT=71045)    Result Date: 12/12/2024  CONCLUSION:  1. Cardiomegaly.  Tortuous aorta. 2. Bilateral mixed multifocal airspace consolidation with slight improved aeration left lung base. 3. Tracheostomy tube overlies the tracheal air column.  Right PICC line with the tip in the SVC.     Dictated by (CST): Woodrow Fischer MD on 12/12/2024 at 11:28 AM     Finalized by (CST): Woodrow Fischer MD on 12/12/2024 at 11:31 AM           CT ABDOMEN+PELVIS(CPT=74176)    Result Date: 12/10/2024  CONCLUSION:  1. Fluid-filled loops of small bowel, which could reflect underlying infectious/inflammatory enteritis or malabsorption in the appropriate clinical setting. Bowel loops are mildly dilated without clear transition point to suggest mechanical bowel obstruction, although early or partial bowel obstruction could have a similar appearance.  2. Extensive distal colonic diverticulosis without CT evidence of acute complication.   3. Bladder distension despite Webb catheter placement.  4. Hepatomegaly with hepatic steatosis.  5. Diffuse anasarca.  6. Partially visualized postthoracotomy chest with possible postoperative seroma/hematoma of the anterior chest wall.  7. Bilateral pleural effusions and associated basilar atelectasis, with or without superimposed pneumonia.  8. Additional scattered patchy nodular opacities may reflect infectious process.   9. Low-density appearance of the intracardiac blood pool raises the possibility of underlying anemia. Correlate with hematologic parameters.  10. Lesser incidental findings as above.    Dictated by (CST): Eulalio Shaikh MD on 12/10/2024 at 6:12 PM     Finalized by (CST): Eulalio Shaikh MD on 12/10/2024 at 6:21 PM          CT ABDOMEN+PELVIS(CPT=74176)    Result Date: 12/9/2024  CONCLUSION:  1. There are dilated and fluid-filled loops of small bowel without clear transition point to suggest mechanical bowel obstruction. Differential diagnostic considerations include underlying infectious/inflammatory enteritis, malabsorption, or early, developing bowel obstruction.   2. Extensive uncomplicated distal colonic diverticulosis.  3. The urinary bladder remains distended despite placement of a Webb catheter.  4. Hepatomegaly with underlying hepatic steatosis.  5. Mild anasarca.  6. Partially visualized postthoracotomy chest demonstrating small pleural effusions and associated basilar atelectasis, with or  without superimposed pneumonia.  7. Additional branching opacities are evident and may reflect small airways disease.   8. Low-density appearance of the intracardiac blood pool raises the possibility of underlying anemia. Correlate with hematologic parameters.  9. Lesser incidental findings as above.    Dictated by (CST): Eulalio Shaikh MD on 12/09/2024 at 6:19 PM     Finalized by (CST): Eulalio Shaikh MD on 12/09/2024 at 6:31 PM           Vital Signs:   Blood pressure (!) 175/68, pulse 93, temperature 100.2 °F (37.9 °C), temperature source Axillary, resp. rate 26, height 5' 5\" (1.651 m), weight 106.8 kg (235 lb 7.2 oz), SpO2 98%, not currently breastfeeding.    Mental Status Exam:   Appearance: Stated age female, in hospital gown, laying down in hospital bed.  Psychomotor: Episodes of restlessness and agitation.  Orientation: Alert and oriented to person. Patient confused  Gait: Not evaluated.  Attitude/Coorperation: limited cooperation   Behavior: episodes of restlessness and agitation.  Speech: soft, incomprehensible. .  Mood: anxious  Affect: restricted  Thought process: Confused, disorganized  Thought content: No reports of  suicidal or homicidal ideation.  Perceptions: Patient has been demonstrating response to internal stimuli.  Concentration: Grossly impaired  Memory: Grossly impaired  Intellect: Average.  Judgment and Insight: Questionable.     Impression:   Delirium  Agitation    Dissection of ascending aorta (HCC)    NAIMA (acute kidney injury) (HCC)    Stage 3 chronic kidney disease (HCC)    Acute respiratory failure with hypoxia (HCC)    Hypervolemia    Ileus (HCC)      Patient is a 59 year old -American, single female with past medical history of CKD, hypertension, high cholesterol who was admitted to the hospital for aortic dissection. The patient has been demonstrating alternation in mood and cognition with episodes of increased restlessness and agitation. According to team, they are trying to  wean patient of propofol and precedex.     Discussed risk and benefit, acknowledging the current symptom and severity.  At this point, I would recommend the following approach:     Focus on safety  Focus on education and support.  Focus on insight orientation helping the patient understand diagnosis and treatment plan.  Start ativan 1 mg IV TID  Utilize ativan 2 mg IV q 4 hours PRN  Utilize haldol 2 mg IV q 6 hours PRN  Processed with patient at length, the initiation of the above psychotropic medications I advised the patient of the risks, benefits, alternatives and potential side effects. The patient consents to administration of the medications and understands the right to refuse medications at any time. The patient verbalized understanding.   Coordinate plan with team    Orders This Visit:  Orders Placed This Encounter   Procedures    Troponin I (High Sensitivity)    CBC With Differential With Platelet    Basic Metabolic Panel (8)    Hepatic Function Panel (7)    PTT, Activated    Prothrombin Time (PT)    Pro Beta Natriuretic Peptide    Open heart surgical profile    CBC, Platelet; No Differential    CBC With Differential With Platelet    Prothrombin Time (PT)    PTT, Activated    Fibrinogen Activity    Expanded Arterial Blood Gas    Expanded Arterial Blood Gas    Arterial blood gas    CBC, Platelet; No Differential    Basic Metabolic Panel (8)    Magnesium    Comp Metabolic Panel (14)    Magnesium    Expanded Arterial Blood Gas    Arterial blood gas    CBC, Platelet; No Differential    Magnesium    Renal Function Panel    Urinalysis, Routine    Microalb/Creat Ratio, Random Urine    Comp Metabolic Panel (14)    Magnesium    CBC With Differential With Platelet    Phosphorus    CBC, Platelet; No Differential    Heparin Induced Platelet    Expanded Arterial Blood Gas    Magnesium    Renal Function Panel    Lipase    Amylase    Lactic Acid, Plasma    Basic Metabolic Panel (8)    Potassium    Lipid Panel    Basic  Metabolic Panel (8)    CBC, Platelet; No Differential    CBC, Platelet; No Differential    Magnesium    Renal Function Panel    CBC, Platelet; No Differential    Triglycerides    Magnesium    CBC With Differential With Platelet    Renal Function Panel    Manual differential    CBC With Differential With Platelet    Procalcitonin    Manual differential    CBC With Differential With Platelet    Renal Function Panel    Magnesium    Manual differential    REDRAW RENAL    Magnesium    CBC With Differential With Platelet    Comp Metabolic Panel (14)    Iron And Tibc    Phosphorus    Manual differential    Expanded Arterial Blood Gas    Magnesium    CBC With Differential With Platelet    Renal Function Panel    PTT, Activated    Prothrombin Time (PT)    Manual differential    CBC, Platelet; No Differential    Comp Metabolic Panel (14)    Lipid Panel    Expanded Arterial Blood Gas    Basic Metabolic Panel (8)    CBC, Platelet; No Differential    Magnesium    Renal Function Panel    CBC With Differential With Platelet    Magnesium    Renal Function Panel    CBC With Differential With Platelet    Potassium    TSH W Reflex To Free T4    Magnesium    CBC With Differential With Platelet    Renal Function Panel    Scan slide    Magnesium    Phosphorus    CBC With Differential With Platelet    Comp Metabolic Panel (14)    REDRAW CMP (P)    Basic Metabolic Panel (8)    CBC, Platelet; No Differential    Basic Metabolic Panel (8)    CBC, Platelet; No Differential    Basic Metabolic Panel (8)    REDRAW BMP    Potassium    Basic Metabolic Panel (8)    CBC With Differential With Platelet    Scan slide    Potassium    RBC Morphology Scan    Magnesium    Phosphorus    Basic Metabolic Panel (8)    Comp Metabolic Panel (14)    Magnesium    Phosphorus    Triglycerides    Triglycerides    Type and screen    ABORH (Blood Type)    Antibody Screen    ABORH Confirmation    Prepare platelets Once    Prepare fresh frozen plasma Once    Prepare RBC  STAT    Prepare cryoprecipitate Once    Type and screen    Prepare RBC Once    ABORH (Blood Type)    Antibody Screen    Type and screen    Prepare RBC STAT    ABORH (Blood Type)    Antibody Screen    Specimen to Pathology Tissue    Rapid SARS-CoV-2 by PCR    MRSA Screen by PCR    Sputum culture       Meds This Visit:  Requested Prescriptions      No prescriptions requested or ordered in this encounter       Flor Jernigan, APRN  12/12/2024    Note to Patient: The 21st Century Cures Act makes medical notes like these available to patients in the interest of transparency. However, be advised this is a medical document. It is intended as peer to peer communication. It is written in medical language and may contain abbreviations or verbiage that are unfamiliar. It may appear blunt or direct. Medical documents are intended to carry relevant information, facts as evident, and the clinical opinion of the practitioner. This note may have been transcribed using a voice dictation system. Voice recognition errors may occur. This should not be taken to alter the content or meaning of this note.           [1]   Allergies  Allergen Reactions    Atorvastatin MYALGIA    Pravastatin MYALGIA    Rosuvastatin MYALGIA    Seasonal Runny nose

## 2024-12-12 NOTE — RESPIRATORY THERAPY NOTE
Pt received on full vent support with Portex 8 tracheostomy. Current vent settings-       12/12/24 0412   Vent Information   Vent Mode PRVC/AC   Settings   FiO2 (%) 30 %   Resp Rate (Set) 18   Vt (Set, mL) 550 mL   PEEP/CPAP (cm H2O) 5 cm H20     Pt suctioned as needed. Trach care provided overnight. Nebs given as scheduled. RT will continue to monitor.

## 2024-12-12 NOTE — PROGRESS NOTES
[facility]  Progress Note    Alisha Wilde Patient Status:  Inpatient    10/25/1965 MRN [id number]   Location [facility] 2W/SW Attending Radha Tidwell MD   Hosp Day # 20 PCP Bridger Avendano MD     Subjective:  Pt seen laying in bed. Vent w/ trach in place. Patient awakens and responds to questions. Reports some abdominal pain and bloating.    Objective/Physical Exam:  General: Alert, orientated x3.  Cooperative.  No apparent distress.  Vital Signs:  Blood pressure 149/65, pulse 75, temperature 99.3 °F (37.4 °C), temperature source Oral, resp. rate 22, height 5' 5\" (1.651 m), weight 235 lb 7.2 oz (106.8 kg), SpO2 99%, not currently breastfeeding.  Wt Readings from Last 3 Encounters:   24 235 lb 7.2 oz (106.8 kg)   10/24/24 254 lb (115.2 kg)   24 249 lb (112.9 kg)     Lungs: No respiratory distress.  Cardiac: Regular rate and rhythm.   Abdomen:  Soft, distended, mild generalized tender, with no rebound or guarding.  No peritoneal signs.   Extremities:  No lower extremity edema noted.      Intake/Output:    Intake/Output Summary (Last 24 hours) at 2024 1014  Last data filed at 2024 0646  Gross per 24 hour   Intake 3193.31 ml   Output 2575 ml   Net 618.31 ml     I/O last 3 completed shifts:  In: 6355.3 [I.V.:5805.3; NG/GT:200; IV PIGGYBACK:350]  Out: 7125 [Urine:4875; Emesis/NG output:2250]  No intake/output data recorded.    Medications:    potassium chloride  40 mEq Intravenous Once    furosemide  40 mg Intravenous Once    sodium bicarbonate  50 mEq Intravenous Once    cloNIDine  1 patch Transdermal Weekly    metoprolol  5 mg Intravenous Q6H    piperacillin-tazobactam  3.375 g Intravenous Q8H    vancomycin  12.5 mg/kg Intravenous Q24H    LORazepam  1 mg Per G Tube TID    carvedilol  37.5 mg Oral BID with meals    aspirin  81 mg Peg Tube Daily    QUEtiapine  50 mg Per G Tube BID    isosorbide dinitrate  40 mg Per NG Tube TID (Nitrates)    hydrALAZINE  150 mg Oral Q8H  [facility]    amLODIPine  10 mg Oral Daily    heparin  5,000 Units Subcutaneous Q8H [facility]    chlorhexidine gluconate  15 mL Mouth/Throat BID    famotidine  20 mg Intravenous Daily       Labs:  Lab Results   Component Value Date    WBC 6.5 12/12/2024    HGB 8.3 12/12/2024    HCT 25.3 12/12/2024    .0 12/12/2024     Lab Results   Component Value Date     12/12/2024    K 3.2 12/12/2024    K 3.2 12/12/2024     12/12/2024    CO2 19.0 12/12/2024    BUN 33 12/12/2024    CREATSERUM 1.44 12/12/2024     12/12/2024    CA 8.8 12/12/2024     Lab Results   Component Value Date    INR 1.17 12/04/2024    INR 1.25 (H) 11/22/2024    INR 0.99 11/21/2024         Assessment  Patient Active Problem List   Diagnosis    Hypercholesteremia    Alcoholism ([facility])    Essential hypertension    Vitamin D deficiency    Adjustment disorder with mixed anxiety and depressed mood    Controlled type 2 diabetes mellitus without complication, without long-term current use of insulin ([facility])    Obesity (BMI 30-39.9)    Neck mass    Polyp of colon    Flat feet, bilateral    Hypokalemia    Myalgia due to statin    Age-related nuclear cataract of both eyes    Positive for microalbuminuria    Dissection of ascending aorta (HCC)    NAIMA (acute kidney injury) (HCC)    Stage 3 chronic kidney disease (HCC)    Acute respiratory failure with hypoxia (HCC)    Hypervolemia     Aortic dissection s/p emergent repair 11/21  Hgb 8.3  Small bowel dilation  Acute hypoxic respiratory failure requiring tracheostomy and ventilation support  S/p PEG tube placement by GI      Plan:  No acute surgical intervention indicated at this time. Will continue to monitor. Will add rectal suppositories and enemas  KUB yesterday showed increase in distention of small bowel loops, repeat image today pending  Continue PEG to LIS as per GI  Keep NPO, hold TF per GI  Continue IV antibiotics  Continue care as per CCU  DVT prophylaxis with heparin  GI prophylaxis with  Pepcid    Quality:  DVT Mechanical Prophylaxis: MYNOR hose, SCDs,    DVT Pharmacologic Prophylaxis   Medication    heparin (Porcine) 5000 UNIT/ML injection 5,000 Units      DVT Pharmacologic prophylaxis: Aspirin 162 mg         Code Status: Full Code  Webb: Webb catheter in place  Webb Duration (in days): 2  Central line:    BRANDON:         Panchito Guzman PA-C  12/12/2024  10:14 AM

## 2024-12-13 LAB
ALBUMIN SERPL-MCNC: 3.3 G/DL (ref 3.2–4.8)
ALBUMIN/GLOB SERPL: 1.3 {RATIO} (ref 1–2)
ALP LIVER SERPL-CCNC: 73 U/L
ALT SERPL-CCNC: 16 U/L
ANION GAP SERPL CALC-SCNC: 10 MMOL/L (ref 0–18)
AST SERPL-CCNC: 15 U/L (ref ?–34)
BILIRUB SERPL-MCNC: 0.5 MG/DL (ref 0.2–1.1)
BUN BLD-MCNC: 27 MG/DL (ref 9–23)
BUN/CREAT SERPL: 21.1 (ref 10–20)
CALCIUM BLD-MCNC: 8.4 MG/DL (ref 8.7–10.4)
CHLORIDE SERPL-SCNC: 118 MMOL/L (ref 98–112)
CO2 SERPL-SCNC: 21 MMOL/L (ref 21–32)
CREAT BLD-MCNC: 1.28 MG/DL
DEPRECATED RDW RBC AUTO: 53.4 FL (ref 35.1–46.3)
EGFRCR SERPLBLD CKD-EPI 2021: 48 ML/MIN/1.73M2 (ref 60–?)
ERYTHROCYTE [DISTWIDTH] IN BLOOD BY AUTOMATED COUNT: 17 % (ref 11–15)
GLOBULIN PLAS-MCNC: 2.5 G/DL (ref 2–3.5)
GLUCOSE BLD-MCNC: 151 MG/DL (ref 70–99)
GLUCOSE BLDC GLUCOMTR-MCNC: 134 MG/DL (ref 70–99)
GLUCOSE BLDC GLUCOMTR-MCNC: 146 MG/DL (ref 70–99)
GLUCOSE BLDC GLUCOMTR-MCNC: 152 MG/DL (ref 70–99)
GLUCOSE BLDC GLUCOMTR-MCNC: 153 MG/DL (ref 70–99)
GLUCOSE BLDC GLUCOMTR-MCNC: 154 MG/DL (ref 70–99)
HCT VFR BLD AUTO: 23 %
HGB BLD-MCNC: 7.3 G/DL
MAGNESIUM SERPL-MCNC: 2.4 MG/DL (ref 1.6–2.6)
MCH RBC QN AUTO: 27.3 PG (ref 26–34)
MCHC RBC AUTO-ENTMCNC: 31.7 G/DL (ref 31–37)
MCV RBC AUTO: 86.1 FL
OSMOLALITY SERPL CALC.SUM OF ELEC: 316 MOSM/KG (ref 275–295)
PHOSPHATE SERPL-MCNC: 2.6 MG/DL (ref 2.4–5.1)
PLATELET # BLD AUTO: 240 10(3)UL (ref 150–450)
POTASSIUM SERPL-SCNC: 3.4 MMOL/L (ref 3.5–5.1)
POTASSIUM SERPL-SCNC: 3.4 MMOL/L (ref 3.5–5.1)
PROT SERPL-MCNC: 5.8 G/DL (ref 5.7–8.2)
RBC # BLD AUTO: 2.67 X10(6)UL
SODIUM SERPL-SCNC: 149 MMOL/L (ref 136–145)
TRIGL SERPL-MCNC: 304 MG/DL (ref 30–149)
VANCOMYCIN PEAK SERPL-MCNC: 35.3 UG/ML (ref 30–50)
WBC # BLD AUTO: 6.5 X10(3) UL (ref 4–11)

## 2024-12-13 PROCEDURE — 99232 SBSQ HOSP IP/OBS MODERATE 35: CPT | Performed by: NURSE PRACTITIONER

## 2024-12-13 PROCEDURE — 99233 SBSQ HOSP IP/OBS HIGH 50: CPT | Performed by: FAMILY MEDICINE

## 2024-12-13 PROCEDURE — 99291 CRITICAL CARE FIRST HOUR: CPT | Performed by: INTERNAL MEDICINE

## 2024-12-13 PROCEDURE — 99233 SBSQ HOSP IP/OBS HIGH 50: CPT | Performed by: INTERNAL MEDICINE

## 2024-12-13 RX ORDER — DEXTROSE MONOHYDRATE 50 MG/ML
INJECTION, SOLUTION INTRAVENOUS CONTINUOUS
Status: ACTIVE | OUTPATIENT
Start: 2024-12-13 | End: 2024-12-13

## 2024-12-13 RX ORDER — FUROSEMIDE 10 MG/ML
40 INJECTION INTRAMUSCULAR; INTRAVENOUS ONCE
Status: COMPLETED | OUTPATIENT
Start: 2024-12-13 | End: 2024-12-13

## 2024-12-13 NOTE — PROGRESS NOTES
[facility]  Progress Note    Alisha Wilde Patient Status:  Inpatient    10/25/1965 MRN [id number]   Location [facility] 2W/SW Attending Radha Tidwell MD   Hosp Day # 21 PCP Bridger Avendano MD     Subjective:  The patient was seen and examined at bedside. On ventilator with trach in place. Patient is somnolent, but responsive to questions. She denies abdominal pain this morning. She reports some abdominal distention. She denies nausea.     Objective/Physical Exam:  General: Somnolent.  Cooperative.  No apparent distress.  Vital Signs:  Blood pressure 131/55, pulse 76, temperature 99.2 °F (37.3 °C), temperature source Temporal, resp. rate 18, height 65\", weight (!) 309 lb 1.4 oz (140.2 kg), SpO2 97%, not currently breastfeeding.  Wt Readings from Last 3 Encounters:   24 (!) 309 lb 1.4 oz (140.2 kg)   10/24/24 254 lb (115.2 kg)   24 249 lb (112.9 kg)     Lungs: vent with trach  Cardiac: Regular rate and rhythm.   Abdomen:  firm,  distended, non tender, with no rebound or guarding.  No peritoneal signs. PEG tube in place  Extremities:  lower extremity edema noted.      Intake/Output:    Intake/Output Summary (Last 24 hours) at 2024 0808  Last data filed at 2024 0520  Gross per 24 hour   Intake 3111.89 ml   Output 3100 ml   Net 11.89 ml     I/O last 3 completed shifts:  In: 4551 [I.V.:3661; IV PIGGYBACK:350]  Out: 4025 [Urine:3175; Emesis/NG output:850]  No intake/output data recorded.    Medications:    LORazepam  1 mg Intravenous TID    cloNIDine  1 patch Transdermal Weekly    metoprolol  5 mg Intravenous Q6H    piperacillin-tazobactam  3.375 g Intravenous Q8H    vancomycin  12.5 mg/kg Intravenous Q24H    carvedilol  37.5 mg Oral BID with meals    aspirin  81 mg Peg Tube Daily    isosorbide dinitrate  40 mg Per NG Tube TID (Nitrates)    hydrALAZINE  150 mg Oral Q8H GABRIEL    amLODIPine  10 mg Oral Daily    heparin  5,000 Units Subcutaneous Q8H GABRIEL    chlorhexidine  gluconate  15 mL Mouth/Throat BID    famotidine  20 mg Intravenous Daily       Labs:     Lab Results   Component Value Date     12/13/2024    K 3.4 12/13/2024    K 3.4 12/13/2024     12/13/2024    CO2 21.0 12/13/2024    BUN 27 12/13/2024    CREATSERUM 1.28 12/13/2024     12/13/2024    CA 8.4 12/13/2024    ALKPHO 73 12/13/2024    ALT 16 12/13/2024    AST 15 12/13/2024    BILT 0.5 12/13/2024    ALB 3.3 12/13/2024    TP 5.8 12/13/2024     Lab Results   Component Value Date    INR 1.17 12/04/2024    INR 1.25 (H) 11/22/2024    INR 0.99 11/21/2024         Assessment  Patient Active Problem List   Diagnosis    Hypercholesteremia    Alcoholism (HCC)    Essential hypertension    Vitamin D deficiency    Adjustment disorder with mixed anxiety and depressed mood    Controlled type 2 diabetes mellitus without complication, without long-term current use of insulin (HCC)    Obesity (BMI 30-39.9)    Neck mass    Polyp of colon    Flat feet, bilateral    Hypokalemia    Myalgia due to statin    Age-related nuclear cataract of both eyes    Positive for microalbuminuria    Dissection of ascending aorta (HCC)    NAIMA (acute kidney injury) (HCC)    Stage 3 chronic kidney disease (HCC)    Acute respiratory failure with hypoxia (HCC)    Hypervolemia    Ileus (HCC)    Delirium    Episodic mood disorder (HCC)       Aortic dissection s/p emergent repair 11/21  Anemia   Small bowel dilation-ileus vs SBO  Acute hypoxic respiratory failure requiring tracheostomy and ventilation support    Plan:  No plan for acute general surgical intervention  Continue with daily tap water enemas until improvement in bowel function  PEG tube management per GI  Continue NPO and hold tube feeds until improvement in bowel function   Continue TPN  Appreciate critical care recommendations  DVT prophylaxis with with heparin  GI prophylaxis with with pepcid    Quality:  DVT Mechanical Prophylaxis: MYNOR hose, SCDs,    DVT Pharmacologic Prophylaxis    Medication    heparin (Porcine) 5000 UNIT/ML injection 5,000 Units      DVT Pharmacologic prophylaxis: Aspirin 162 mg         Code Status: Full Code  Webb: Webb catheter in place  Webb Duration (in days): 3  Central line:    BRANDON:         Monica Cartagena PA-C  12/13/2024  8:08 AM      Continue treating constipation. Likely element of ileus as well. Please reach out with questions/concerns.    Chloe Lilly MD  [facility] - General Surgery   [address] [place] [address]  p [phone]

## 2024-12-13 NOTE — CM/SW NOTE
Sister unable to obtain financial information needed for medicaid application or for RML to approve.  Sister did speak with [place] yesterday.  Preference is for RML per my discussion with the sister today.      DIMPLE received a call from Milka Min, [phone] regarding STD.  DIMPLE left a message for Milka with [facility] to see what information they are asking.  Sister is okay with CM communicating with [place].    Still need confirmation for insurance remaining active after January 1st.  Sister had shared with [place] that insurance will term in December.  Today the sister shared that insurance will remain in effect in January but she could not obtain confirmation.    CM will follow-up on Monday as Alisha is not clinically ready for dc.    Zohra GONZALEZA BSN RN CRRN   RN Case Manager  [phone]

## 2024-12-13 NOTE — PROGRESS NOTES
[facility]  part of [facility]    Progress Note      Subjective:     On labetolol and nicardipine gtt - BP in 120s range    Getting TOB     Webb with clear urine     Review of Systems:     Unable to obtain    Objective:   Temp:  [98.2 °F (36.8 °C)-99.8 °F (37.7 °C)] 98.4 °F (36.9 °C)  Pulse:  [75-84] 77  Resp:  [15-27] 20  BP: (117-152)/(52-96) 128/57  SpO2:  [96 %-100 %] 98 %  FiO2 (%):  [30 %] 30 %  SpO2: 98 %     Intake/Output Summary (Last 24 hours) at 12/13/2024 1329  Last data filed at 12/13/2024 0520  Gross per 24 hour   Intake 2920.91 ml   Output 3100 ml   Net -179.09 ml     Wt Readings from Last 3 Encounters:   12/13/24 (!) 309 lb 1.4 oz (140.2 kg)   10/24/24 254 lb (115.2 kg)   05/17/24 249 lb (112.9 kg)       General appearance: Intubated - bit awake today   Head: Normocephalic, atraumatic  Neck:  no JVD, supple, symmetrical  Skin: No rashes or lesions  Neurologic: unable to examine       Medications:  Current Facility-Administered Medications   Medication Dose Route Frequency    potassium chloride 40 mEq in 250mL sodium chloride 0.9% IVPB premix  40 mEq Intravenous Once    adult 3 in 1 TPN   Intravenous Continuous TPN    labetalol (Trandate) 200 mg in sodium chloride 0.9% 200 mL infusion  0.5-10 mg/min Intravenous Continuous    LORazepam (Ativan) 2 mg/mL injection 1 mg  1 mg Intravenous TID    LORazepam (Ativan) 2 mg/mL injection 2 mg  2 mg Intravenous Q4H PRN    haloperidol lactate (Haldol) 5 MG/ML injection 2 mg  2 mg Intravenous Q6H PRN    dextrose 10% infusion (TPN no rate)   Intravenous Continuous PRN    adult 3 in 1 TPN   Intravenous Continuous TPN    fentaNYL (Sublimaze) 50 mcg/mL injection 25 mcg  25 mcg Intravenous Q2H PRN    fentaNYL (Sublimaze) 50 mcg/mL injection 50 mcg  50 mcg Intravenous Q2H PRN    bisacodyl (Dulcolax) 10 MG rectal suppository 10 mg  10 mg Rectal Daily PRN    dexmedeTOMIDine (Precedex) 800 mcg in sodium chloride 0.9% 100 mL infusion  0.2-1.5  mcg/kg/hr (Dosing Weight) Intravenous Continuous    acetaminophen (Ofirmev) 10 mg/mL infusion premix 1,000 mg  1,000 mg Intravenous Q6H PRN    cloNIDine (Catapres) 0.3 MG/24HR patch 1 patch  1 patch Transdermal Weekly    metoprolol (Lopressor) 5 mg/5mL injection 5 mg  5 mg Intravenous Q6H    sodium chloride 3 % nebulizer solution 3 mL  3 mL Nebulization PRN    albuterol (Ventolin) (2.5 MG/3ML) 0.083% nebulizer solution 2.5 mg  2.5 mg Nebulization Q6H PRN    piperacillin-tazobactam (Zosyn) 3.375 g in dextrose 5% 100 mL IVPB-ADDV  3.375 g Intravenous Q8H    vancomycin (Vancocin) 1.75 g in sodium chloride 0.9% 500mL IVPB premix  12.5 mg/kg Intravenous Q24H    hydrALAzine (Apresoline) 20 mg/mL injection 10 mg  10 mg Intravenous Q4H PRN    carvedilol (Coreg) tab 37.5 mg  37.5 mg Oral BID with meals    acetaminophen (Tylenol) 160 MG/5ML oral liquid 650 mg  650 mg Oral Q6H PRN    aspirin chewable tab 81 mg  81 mg Peg Tube Daily    niCARdipine (carDENE) 125 mg in sodium chloride 0.9% 250 mL infusion  5-15 mg/hr Intravenous Continuous    isosorbide dinitrate (Isordil) tab 40 mg  40 mg Per NG Tube TID (Nitrates)    hydrALAZINE (Apresoline) tab 150 mg  150 mg Oral Q8H GABRIEL    amLODIPine (Norvasc) tab 10 mg  10 mg Oral Daily    ipratropium-albuterol (Duoneb) 0.5-2.5 (3) MG/3ML inhalation solution 3 mL  3 mL Nebulization Q6H PRN    HYDROcodone-acetaminophen (Norco) 5-325 MG per tab 2 tablet  2 tablet Oral Q4H PRN    heparin (Porcine) 5000 UNIT/ML injection 5,000 Units  5,000 Units Subcutaneous Q8H GABRIEL    melatonin tab 3 mg  3 mg Oral Nightly PRN    potassium chloride 20 mEq/100mL IVPB premix 20 mEq  20 mEq Intravenous PRN    Or    potassium chloride 40 mEq/100mL IVPB premix (central line) 40 mEq  40 mEq Intravenous PRN    magnesium sulfate in dextrose 5% 1 g/100mL infusion premix 1 g  1 g Intravenous PRN    magnesium sulfate in sterile water for injection 2 g/50mL IVPB premix 2 g  2 g Intravenous PRN    chlorhexidine gluconate  (Peridex) 0.12 % oral solution 15 mL  15 mL Mouth/Throat BID    famotidine (Pepcid) 20 mg/2mL injection 20 mg  20 mg Intravenous Daily          Results:     Recent Labs   Lab 12/08/24  0637 12/09/24  0609 12/10/24  0607 12/11/24  0547 12/12/24  0443 12/13/24  0904   RBC 3.84 3.18*   < > 2.94* 2.95* 2.67*   HGB 10.9* 9.0*   < > 8.2* 8.3* 7.3*   HCT 33.5* 27.7*   < > 25.4* 25.3* 23.0*   MCV 87.2 87.1   < > 86.4 85.8 86.1   NEPRELIM 6.94 7.06  --   --  3.64  --    WBC 9.9 10.2   < > 6.4 6.5 6.5   .0 165.0   < > 369.0 275.0 240.0    < > = values in this interval not displayed.     Recent Labs   Lab 12/08/24  0637 12/09/24  0609 12/09/24  0720 12/09/24  1453 12/11/24  0547 12/11/24  0759 12/12/24  0443 12/12/24  1825 12/13/24  0612   * 132*  --    < > 121*  --  122*  --  151*   BUN 38*  --  37*   < >  --  41* 33*  --  27*   CREATSERUM 1.48* 1.51*  --    < > 1.85*  --  1.44*  --  1.28*   CA 8.4* 9.1  --    < > 8.4*  --  8.8  --  8.4*   ALB 3.8 3.7  --   --   --   --   --   --  3.3    143  --    < > 143  --  146*  --  149*   K 5.1  5.1  --  3.9   < >  --  3.4* 3.2*  3.2* 3.2* 3.4*  3.4*   * 115*  --    < > 113*  --  116*  --  118*   CO2 18.0* 17.0*  --    < > 17.0*  --  19.0*  --  21.0   ALKPHO  --  101  --   --   --   --   --   --  73   AST  --   --  24  --   --   --   --   --  15   ALT  --  28 25  --   --   --   --   --  16   BILT  --  0.3  --   --   --   --   --   --  0.5   TP  --  6.7  --   --   --   --   --   --  5.8    < > = values in this interval not displayed.     PTT   Date Value Ref Range Status   12/04/2024 30.7 23.0 - 36.0 seconds Final     INR   Date Value Ref Range Status   12/04/2024 1.17 0.80 - 1.20 Final     Comment:     Therapeutic INR range for patients on warfarin:  2.0-3.0 for most medical conditions and surgical prophylaxis   2.5-3.5 for mechanical heart valves and recurrent thromboembolism    Direct thrombin inhibitors (e.g. argatroban, bivalirudin), factor Xa inhibitors  (e.g. apixaban, rivaroxaban), and conditions such as coagulation factor deficiency, vitamin K deficiency, and liver disease will prolong the prothrombin time/INR.    Unfractionated heparin and low molecular weight heparin do not affect the prothrombin time/INR up to a concentration of 1 IU/mL and 1.5 IU/mL, respectively.         No results for input(s): \"BNP\" in the last 168 hours.  Recent Labs   Lab 12/09/24  0609 12/12/24  0443 12/13/24  0612   MG 2.5 2.4 2.4   PHOS 4.2 3.1 2.6        Recent Labs   Lab 12/08/24  0637 12/09/24  0609 12/12/24  0443 12/13/24  0612   PHOS 4.2 4.2 3.1 2.6   ALB 3.8 3.7  --  3.3       XR ABDOMEN (1 VIEW) (CPT=74018)    Result Date: 12/12/2024  CONCLUSION:  1. Nonspecific small bowel dilatation left mid abdomen and central abdomen measures up to 4.5 cm. 2. Mild stool scattered throughout the colon suggests constipation fecal retention.     Dictated by (CST): Woodrow Fischer MD on 12/12/2024 at 1:58 PM     Finalized by (CST): Woodrow Fischer MD on 12/12/2024 at 2:01 PM          XR CHEST AP PORTABLE  (CPT=71045)    Result Date: 12/12/2024  CONCLUSION:  1. Cardiomegaly.  Tortuous aorta. 2. Bilateral mixed multifocal airspace consolidation with slight improved aeration left lung base. 3. Tracheostomy tube overlies the tracheal air column.  Right PICC line with the tip in the SVC.     Dictated by (CST): Woodrow Fischer MD on 12/12/2024 at 11:28 AM     Finalized by (CST): Woodrow Fischer MD on 12/12/2024 at 11:31 AM         EKG 12 Lead    Result Date: 12/12/2024  Normal sinus rhythm Nonspecific T wave abnormality Abnormal ECG When compared with ECG of 23-NOV-2024 04:39, Nonspecific T wave abnormality now evident in Anterior-lateral leads Confirmed by landon shankar ([id number]) on 12/12/2024 3:52:29 PM     Assessment and Plan:       1.NAIMA on CKD stage III: Nonoliguric on diuretic  NAIMA secondary to MARY LOU/ATN.  creatinine currently mostly at 1.4 1.5 mg/dL .   Kidney function improving -  Cr 1.85 mg/dl -> 1.44 ->1.28  mg/dl today   Was on IV furosemide 40 mg twice daily-changed to furosemide 10 mg/h given volume overload - was taken off of it on 12/10   Will give one dose of furosemide today - receiving it on prn basis     2.aortic dissection: S/p emergent aortic dissection repair  CT surgery input noted  S/p CT chest abdomen pelvis   Has required packed red blood cell transfusions for drop in hemoglobin  Replace potassium   Hyperchloremic metabolic acidosis: GI loss. Improve bicarb today   Hypernatremia: Na 146 ->149. on TPN. Will give 500 ml of dextrose today     3.respiratory failure:  S/p tracheostomy -unable to extubate the patient    4 -renal cyst  The renal ultrasound showed a mildly complex cyst in the right kidney.  This will need to be followed up with surveillance ultrasound    5.abdominal distention: ileua:S/p PEG 12/5  High tube feed residuals-s/p KUB bowel obstruction or ileus but large stool burden  S/p bowel regimen  Currently tube feed on hold and peg tube to suction   CTAP to rule out obstruction -- none seen. GI input noted. Plan for peg to low intermittent suction.   Kub ileus    6.volume overload/bilateral lower extremity edema:  Prn lasix. Off of gtt for now. Give one dose today     7.hypertension: Blood pressure well-controlled well patient is calm  On labetolol and nicardipine  Renal ultrasound with Doppler unremarkable for any renal artery stenosis    Discussed with nursing at bedside    Robbi Resendiz MD

## 2024-12-13 NOTE — PROGRESS NOTES
[facility]  part of [facility]    Progress Note    Alisha Wilde Patient Status:  Inpatient    10/25/1965 MRN [id number]   Location [facility] 2W/SW Attending Radha Tidwell MD   Hosp Day # 21 PCP Bridger Avendano MD     Subjective:  Pt in bed. Currently on full vent support w/Trach in place. She easily awakens to verbal stimuli. More engaged today however still on low dose Precedex so not completely alert. She is calm. Propofol off. She nods head to simple questions. Denies pain & SOB. Able to follow some simple commands such as squeezes hands and wiggles toes. Pt extremely weak.   No visitors at bedside.     Objective:  BP (!) 117/96 (BP Location: Left arm)   Pulse 78   Temp 98.2 °F (36.8 °C) (Temporal)   Resp 19   Ht 5' 5\" (1.651 m)   Wt (!) 309 lb 1.4 oz (140.2 kg)   SpO2 97%   BMI 51.43 kg/m²     Temp (24hrs), Av.2 °F (37.3 °C), Min:98.2 °F (36.8 °C), Max:100.2 °F (37.9 °C)      Intake/Output:    Intake/Output Summary (Last 24 hours) at 2024 0945  Last data filed at 2024 0520  Gross per 24 hour   Intake 3062.91 ml   Output 3100 ml   Net -37.09 ml       Wt Readings from Last 3 Encounters:   24 (!) 309 lb 1.4 oz (140.2 kg)   10/24/24 254 lb (115.2 kg)   24 249 lb (112.9 kg)       Allergies:  Allergies[1]    Labs:  Lab Results   Component Value Date    WBC 6.5 2024    HGB 7.3 2024    HCT 23.0 2024    .0 2024    CREATSERUM 1.28 2024    BUN 27 2024     2024    K 3.4 2024    K 3.4 2024     2024    CO2 21.0 2024     2024    CA 8.4 2024    ALB 3.3 2024    ALKPHO 73 2024    BILT 0.5 2024    TP 5.8 2024    AST 15 2024    ALT 16 2024    MG 2.4 2024    PHOS 2.6 2024       Physical Exam:  Blood pressure (!) 117/96, pulse 78, temperature 98.2 °F (36.8 °C), temperature source Temporal, resp. rate 19, height 5'  5\" (1.651 m), weight (!) 309 lb 1.4 oz (140.2 kg), SpO2 97%, not currently breastfeeding.  General: NAD  Neck: No JVD  Lungs: Coarse bilaterally anteriorly & laterally   Heart: RRR, S1, S2  Abdomen: Distended/slightly more firm than yesterday/Obese, slightly tender (nods head), BS+x 4/PEG to LIS. TF off. Daily enemas.   Extremities: Warm, dry, 2+ generalized UE & LE edema bilat  Pulses: 2+ bilat DP  Skin: sternotomy incision CATHIE=CDI-healing well  Neurological:  awakens to verbal stimuli/nods head to simple questions/moves all ext on command.     Assessment/Plan:  S/P EMERGENT AORTIC DISSECTION REPAIR POD # 21  Respiratory Failure w/multiple failed SBT prior to trach placement -currently on full vent support-continue daily weaning trials w/mgt per Pulm. Re-intubated on 11/22 per Pulm after emesis episode/ETT removal due to emesis content noted in airway.    S/P Trach placement per ENT on 12/4 w/Trach changed 12/10   Completed ABX course for suspected aspiration pneumonia post op.  ABX re-started on 12/9 after emesis episode on 12/9: concern for aspiration   +BM (s) post op however pt w/episode of emesis on 12/9 w/stool content noted/+high PEG residuals. TF continue on hold due to persistently high residuals/less today. PEG to LIS.  Mgt per GI. Daily enemas per GI. Per bedside RN-pt impacted and digital stool removal attempted per bedside RN (day/night)  Hx: HTN: on multiple antihypertensives including IV Cardene & IV Labetolol-weaning as able: SBP goal= <130/MAP >70. Mgt per Cards. Can wean once able to take meds through PEG.    New A-line placed 12/2  New PICC placed 12/2  Pain meds as needed-avoid/limit narcotics w/recent constipation/emesis episodes/dilated bowel loops on Abdominal X-ray on 12/12  Scds/Heparin SubQ prophylaxis DVT prevention. HIPA 11/25=negative. Plts continue >100,000  Continue ICU status  Hx: CKD. Expected post op volume overload w/mgt per Nephrology (consulted on 11/23) for NAIMA: CR continues to  improve. Limit/avoid nephrotoxic meds: Lasix per Nephrology. Aguilar remains for close I/O monitoring/immobility-new aguilar placed 12/9 then again 12/10 due to obstruction. Kidney US w/doppler on 12/8.   Expected post op anemia: w/o clinical significance/stable. May be slightly diluted due to volume overload.   Hx: Morbid obesity w/BMI >40-plan for RD to see when appropriate  Nutrition per TF via PEG-continues on hold due to high PEG residuals. PEG placed 12/5 per GI.  PEG to LIS. Mgt per GI.  Hx: ETOH abuse-monitor for withdrawals. CIWA protocol PRN   Psych consulted 12/12 to assist w/med mgt-follow recs  Discharge planning: pt lives alone and has supportive family.   Anticipate LTAC at discharge: referrals sent proactively by  on 11/29 for LTAC.      Plan of care discussed w/RN and CV Surgeon: Dr. Marysol Noel, APRN  12/13/2024  9:45 AM       [1]   Allergies  Allergen Reactions    Atorvastatin MYALGIA    Pravastatin MYALGIA    Rosuvastatin MYALGIA    Seasonal Runny nose

## 2024-12-13 NOTE — RESPIRATORY THERAPY NOTE
Pt received on full support with Portex 8 Trach. Tolerating well pt suctioned as needed. No changes made.   RT will continue to monitor.      12/13/24 2255   Vent Information   $ Vent Care / Non-Invasive Subsequent Day Yes   Is this patient on Chronic Ventilation? Yes   Interface Invasive   Vent Type AV   Vent plugged into main power? Yes   Vent ID    Vent Mode PRVC/AC   Settings   FiO2 (%) 30 %   Resp Rate (Set) 18   Vt (Set, mL) 550 mL   Waveform Decelerating ramp   PEEP/CPAP (cm H2O) 5 cm H20   Peak Flow LPM 60   Insp Time (sec) 0.9 sec   Trigger Sensitivity Flow (L/min) 2 L/min   Humidification Heater   H2O Bag Level Filled   Heater Temperature 98.6 °F (37 °C)   Readings   Total RR 18   Minute Ventilation (L/min) 9.4 L/min   Expiratory Tidal Volume 520 mL   PIP Observed (cm H2O) 36 cm H2O   MAP (cm H2O) 12   I/E Ratio 1:2.7   Plateau Pressure (cm H2O) 31 cm H2O

## 2024-12-13 NOTE — PROGRESS NOTES
[facility]     Gastroenterology Progress Note    Alisha Wilde Patient Status:  Inpatient    10/25/1965 MRN [id number]   Location [facility] 2W/SW Attending Missy Paulson MD   Hosp Day # 21 PCP Bridger Avendano MD       Subjective:   D/w RN output from PEG scant bilious  Objective:   Blood pressure 128/57, pulse 77, temperature 98.4 °F (36.9 °C), temperature source Temporal, resp. rate 20, height 5' 5\" (1.651 m), weight (!) 309 lb 1.4 oz (140.2 kg), SpO2 98%, not currently breastfeeding. Body mass index is 51.43 kg/m².    Gen: sedated, NAD  HEENT: mucus membranes appear moist, trach to vent  CV: RRR  Lung: no conversational dyspnea   Abdomen: soft NT, less distended today with hypoactive BS appreciated, peg luq  Skin: dry, warm, no jaundice  Ext: +edema  Neuro: sedated    Assessment and Plan:   Alisha Wilde is a 59 year old female w/ PMHx of hypertension, GERD, CKD, hyperlipidemia who presented to ER 2024 for chest pain. Underwent emergent repair of aortic type A dissection on 2024. Inability to wean from the ventillator now s/p trach and PEG placement -.  GI reconsulted for high residual TF.     # Small bowel dilation, likely ileus less likely evolving SBO  -s/p PEG   -High TF residuals since onset  -CT abdomen pelvis with dilated fluid-filled loops of small bowel, no transition point  -output from PEG appears to have slowed down today  -Repeat KUB today  -continue to hold tube feeds  -remains on TPN  -Avoid dysmotility agents    D/w nursing    Toma Barrientos MD      Results:     Lab Results   Component Value Date    WBC 6.5 2024    HGB 7.3 (L) 2024    HCT 23.0 (L) 2024    .0 2024    CREATSERUM 1.28 (H) 2024    BUN 27 (H) 2024     (H) 2024    K 3.4 (L) 2024    K 3.4 (L) 2024     (H) 2024    CO2 21.0 2024     (H) 2024    CA 8.4 (L) 2024    ALB 3.3 2024     ALKPHO 73 12/13/2024    BILT 0.5 12/13/2024    TP 5.8 12/13/2024    AST 15 12/13/2024    ALT 16 12/13/2024    PTT 30.7 12/04/2024    INR 1.17 12/04/2024    TSH 2.085 12/07/2024    NATHALIE 99 11/25/2024    LIP 28 11/25/2024    DDIMER 0.42 12/08/2019    ESRML 15 06/05/2021    MG 2.4 12/13/2024    PHOS 2.6 12/13/2024    TROP <0.045 12/08/2019     (H) 09/23/2021    B12 1,156 (H) 07/23/2018       XR ABDOMEN (1 VIEW) (CPT=74018)    Result Date: 12/12/2024  CONCLUSION:  1. Nonspecific small bowel dilatation left mid abdomen and central abdomen measures up to 4.5 cm. 2. Mild stool scattered throughout the colon suggests constipation fecal retention.     Dictated by (CST): Woodrow Fischer MD on 12/12/2024 at 1:58 PM     Finalized by (CST): Woodrow Fischer MD on 12/12/2024 at 2:01 PM          XR CHEST AP PORTABLE  (CPT=71045)    Result Date: 12/12/2024  CONCLUSION:  1. Cardiomegaly.  Tortuous aorta. 2. Bilateral mixed multifocal airspace consolidation with slight improved aeration left lung base. 3. Tracheostomy tube overlies the tracheal air column.  Right PICC line with the tip in the SVC.     Dictated by (CST): Woodrow Fischer MD on 12/12/2024 at 11:28 AM     Finalized by (CST): Woodrow Fischer MD on 12/12/2024 at 11:31 AM         EKG 12 Lead    Result Date: 12/12/2024  Normal sinus rhythm Nonspecific T wave abnormality Abnormal ECG When compared with ECG of 23-NOV-2024 04:39, Nonspecific T wave abnormality now evident in Anterior-lateral leads Confirmed by landon shankar ([id number]) on 12/12/2024 3:52:29 PM

## 2024-12-13 NOTE — PROGRESS NOTES
Patient is a 59 year old -American, single female with past medical history of CKD, hypertension, high cholesterol who was admitted to the hospital for aortic dissection. The patient has been demonstrating alternation in mood and cognition with episodes of increased restlessness and agitation. According to team, they are trying to wean patient of propofol and precedex. Patient indicated for psych consult for evaluation and advise.  Consult Duration   The patient seen for over 50-minute, follow-up evaluation, over 50% counseling and coordinating care addressing agitation, restlessness, medication management..    Record reviewed, communication with attending, communication with RN and patient seen face to face evaluation.  History of Present Illness:     Communicating with the team, the patient was restless overnight. PRN haldol given this morning.     The patient received the following psychotropic medications: clonidine 0.3 mg Patch, ativan 1 mg IV TID. PRN haldol and ativan given. Precedex continued.     Labs and imaging reviewed: Glucose 151, sodium 149, potassium 4.3, Bun 27, creatinine 1.28    The patient seen today laying on hospital bed.  She continues to demonstrates some restlessness.     Patient is awake, alert. She shakes her head yes and no otherwise is not able to answer questions or engage in conversation.     Provided patient with supportive psychotherapy including emotional support and encouragement.     Past Medical History  Past Medical History:    Chronic kidney disease (CKD)    Esophageal reflux    High blood pressure    High cholesterol    HYPERTENSION    Hypertension    Unspecified essential hypertension       Past Surgical History  Past Surgical History:   Procedure Laterality Date    Colonoscopy N/A 2018    Procedure: COLONOSCOPY;  Surgeon: Magdy Webber MD;  Location: [facility] ENDOSCOPY    Endometrial ablation      child passed away for 5 mins          1    Other  surgical history  11    cysto-Dr. Lacey -- pt denies       Family History  Family History   Problem Relation Age of Onset    Hypertension Mother     Heart Disorder Mother 70    Other (Other) Mother         kidney failure    Hypertension Father     Hypertension Maternal Grandfather     Cancer Neg     Diabetes Neg     Glaucoma Neg     Macular degeneration Neg        Social History  Social History     Socioeconomic History    Marital status: Single   Tobacco Use    Smoking status: Former     Current packs/day: 0.00     Average packs/day: 1 pack/day for 13.0 years (13.0 ttl pk-yrs)     Types: Cigarettes     Start date:      Quit date:      Years since quittin.9    Smokeless tobacco: Former   Vaping Use    Vaping status: Never Used   Substance and Sexual Activity    Alcohol use: Yes     Alcohol/week: 1.0 - 2.0 standard drink of alcohol     Types: 1 - 2 Cans of beer per week     Comment: every day    Drug use: No     Social Drivers of Health     Food Insecurity: Unknown (2024)    Food Insecurity     Food Insecurity: Patient unable to answer   Transportation Needs: Unknown (2024)    Transportation Needs     Lack of Transportation: Patient unable to answer   Housing Stability: Unknown (2024)    Housing Stability     Housing Instability: Patient unable to answer           Current Medications:  Current Facility-Administered Medications   Medication Dose Route Frequency    potassium chloride 40 mEq in 250mL sodium chloride 0.9% IVPB premix  40 mEq Intravenous Once    furosemide (Lasix) 10 mg/mL injection 40 mg  40 mg Intravenous Once    labetalol (Trandate) 200 mg in sodium chloride 0.9% 200 mL infusion  0.5-10 mg/min Intravenous Continuous    LORazepam (Ativan) 2 mg/mL injection 1 mg  1 mg Intravenous TID    LORazepam (Ativan) 2 mg/mL injection 2 mg  2 mg Intravenous Q4H PRN    haloperidol lactate (Haldol) 5 MG/ML injection 2 mg  2 mg Intravenous Q6H PRN    dextrose 10% infusion (TPN no  rate)   Intravenous Continuous PRN    adult 3 in 1 TPN   Intravenous Continuous TPN    fentaNYL (Sublimaze) 50 mcg/mL injection 25 mcg  25 mcg Intravenous Q2H PRN    fentaNYL (Sublimaze) 50 mcg/mL injection 50 mcg  50 mcg Intravenous Q2H PRN    bisacodyl (Dulcolax) 10 MG rectal suppository 10 mg  10 mg Rectal Daily PRN    dexmedeTOMIDine (Precedex) 800 mcg in sodium chloride 0.9% 100 mL infusion  0.2-1.5 mcg/kg/hr (Dosing Weight) Intravenous Continuous    acetaminophen (Ofirmev) 10 mg/mL infusion premix 1,000 mg  1,000 mg Intravenous Q6H PRN    cloNIDine (Catapres) 0.3 MG/24HR patch 1 patch  1 patch Transdermal Weekly    metoprolol (Lopressor) 5 mg/5mL injection 5 mg  5 mg Intravenous Q6H    sodium chloride 3 % nebulizer solution 3 mL  3 mL Nebulization PRN    albuterol (Ventolin) (2.5 MG/3ML) 0.083% nebulizer solution 2.5 mg  2.5 mg Nebulization Q6H PRN    piperacillin-tazobactam (Zosyn) 3.375 g in dextrose 5% 100 mL IVPB-ADDV  3.375 g Intravenous Q8H    vancomycin (Vancocin) 1.75 g in sodium chloride 0.9% 500mL IVPB premix  12.5 mg/kg Intravenous Q24H    hydrALAzine (Apresoline) 20 mg/mL injection 10 mg  10 mg Intravenous Q4H PRN    carvedilol (Coreg) tab 37.5 mg  37.5 mg Oral BID with meals    acetaminophen (Tylenol) 160 MG/5ML oral liquid 650 mg  650 mg Oral Q6H PRN    aspirin chewable tab 81 mg  81 mg Peg Tube Daily    niCARdipine (carDENE) 125 mg in sodium chloride 0.9% 250 mL infusion  5-15 mg/hr Intravenous Continuous    isosorbide dinitrate (Isordil) tab 40 mg  40 mg Per NG Tube TID (Nitrates)    hydrALAZINE (Apresoline) tab 150 mg  150 mg Oral Q8H GABRIEL    amLODIPine (Norvasc) tab 10 mg  10 mg Oral Daily    ipratropium-albuterol (Duoneb) 0.5-2.5 (3) MG/3ML inhalation solution 3 mL  3 mL Nebulization Q6H PRN    HYDROcodone-acetaminophen (Norco) 5-325 MG per tab 2 tablet  2 tablet Oral Q4H PRN    heparin (Porcine) 5000 UNIT/ML injection 5,000 Units  5,000 Units Subcutaneous Q8H GABRIEL    melatonin tab 3 mg  3 mg  Oral Nightly PRN    potassium chloride 20 mEq/100mL IVPB premix 20 mEq  20 mEq Intravenous PRN    Or    potassium chloride 40 mEq/100mL IVPB premix (central line) 40 mEq  40 mEq Intravenous PRN    magnesium sulfate in dextrose 5% 1 g/100mL infusion premix 1 g  1 g Intravenous PRN    magnesium sulfate in sterile water for injection 2 g/50mL IVPB premix 2 g  2 g Intravenous PRN    chlorhexidine gluconate (Peridex) 0.12 % oral solution 15 mL  15 mL Mouth/Throat BID    famotidine (Pepcid) 20 mg/2mL injection 20 mg  20 mg Intravenous Daily     Medications Prior to Admission   Medication Sig    Acetaminophen ER (TYLENOL 8 HOUR) 650 MG Oral Tab CR Take 2 tablets (1,300 mg total) by mouth daily as needed.    Calcium Carb-Cholecalciferol 600-10 MG-MCG Oral Tab Take 1 tablet by mouth daily.    metoprolol succinate ER 50 MG Oral Tablet 24 Hr Take 1 tablet (50 mg total) by mouth daily.    Losartan Potassium-HCTZ 100-12.5 MG Oral Tab Take 1 tablet by mouth daily.    Potassium Chloride ER 20 MEQ Oral Tab CR Take 1 tablet by mouth daily.    albuterol 108 (90 Base) MCG/ACT Inhalation Aero Soln Inhale 2 puffs into the lungs every 4 (four) hours as needed for Wheezing.    amLODIPine 10 MG Oral Tab Take 1 tablet (10 mg total) by mouth daily.    Ascorbic Acid (VITAMIN C) 1000 MG Oral Tab Take 1 tablet (1,000 mg total) by mouth daily.    vitamin B-12 50 MCG Oral Tab Take 1 tablet (50 mcg total) by mouth daily.    fluticasone propionate 50 MCG/ACT Nasal Suspension 2 sprays by Nasal route daily.    fluticasone-salmeterol 250-50 MCG/ACT Inhalation Aerosol Powder, Breath Activated Inhale 1 puff into the lungs 2 (two) times daily. inhale 1 puff by INHALATION route 2 times every day morning and evening approximately 12 hours apart (Patient not taking: Reported on 11/21/2024)       Allergies  Allergies[1]    Review of Systems:   As by Admitting/Attending    Results:   Laboratory Data:  Lab Results   Component Value Date    WBC 6.5 12/13/2024     HGB 7.3 (L) 12/13/2024    HCT 23.0 (L) 12/13/2024    .0 12/13/2024    CREATSERUM 1.28 (H) 12/13/2024    BUN 27 (H) 12/13/2024     (H) 12/13/2024    K 3.4 (L) 12/13/2024    K 3.4 (L) 12/13/2024     (H) 12/13/2024    CO2 21.0 12/13/2024     (H) 12/13/2024    CA 8.4 (L) 12/13/2024    ALB 3.3 12/13/2024    ALKPHO 73 12/13/2024    TP 5.8 12/13/2024    AST 15 12/13/2024    ALT 16 12/13/2024    PTT 30.7 12/04/2024    INR 1.17 12/04/2024    PTP 15.6 (H) 12/04/2024    TSH 2.085 12/07/2024    NATHALIE 99 11/25/2024    LIP 28 11/25/2024    DDIMER 0.42 12/08/2019    ESRML 15 06/05/2021    MG 2.4 12/13/2024    PHOS 2.6 12/13/2024    TROP <0.045 12/08/2019     (H) 09/23/2021    B12 1,156 (H) 07/23/2018         Imaging:  XR ABDOMEN (1 VIEW) (CPT=74018)    Result Date: 12/12/2024  CONCLUSION:  1. Nonspecific small bowel dilatation left mid abdomen and central abdomen measures up to 4.5 cm. 2. Mild stool scattered throughout the colon suggests constipation fecal retention.     Dictated by (CST): Woodrow Fischer MD on 12/12/2024 at 1:58 PM     Finalized by (CST): Woodrow Fischer MD on 12/12/2024 at 2:01 PM          XR CHEST AP PORTABLE  (CPT=71045)    Result Date: 12/12/2024  CONCLUSION:  1. Cardiomegaly.  Tortuous aorta. 2. Bilateral mixed multifocal airspace consolidation with slight improved aeration left lung base. 3. Tracheostomy tube overlies the tracheal air column.  Right PICC line with the tip in the SVC.     Dictated by (CST): Woodrow Fischer MD on 12/12/2024 at 11:28 AM     Finalized by (CST): Woodrow Fischer MD on 12/12/2024 at 11:31 AM          CT ABDOMEN+PELVIS(CPT=74176)    Result Date: 12/10/2024  CONCLUSION:  1. Fluid-filled loops of small bowel, which could reflect underlying infectious/inflammatory enteritis or malabsorption in the appropriate clinical setting. Bowel loops are mildly dilated without clear transition point to suggest mechanical bowel obstruction,  although early or partial bowel obstruction could have a similar appearance.  2. Extensive distal colonic diverticulosis without CT evidence of acute complication.   3. Bladder distension despite Webb catheter placement.  4. Hepatomegaly with hepatic steatosis.  5. Diffuse anasarca.  6. Partially visualized postthoracotomy chest with possible postoperative seroma/hematoma of the anterior chest wall.  7. Bilateral pleural effusions and associated basilar atelectasis, with or without superimposed pneumonia.  8. Additional scattered patchy nodular opacities may reflect infectious process.   9. Low-density appearance of the intracardiac blood pool raises the possibility of underlying anemia. Correlate with hematologic parameters.  10. Lesser incidental findings as above.    Dictated by (CST): Eulalio Shaikh MD on 12/10/2024 at 6:12 PM     Finalized by (CST): Eulalio Shaikh MD on 12/10/2024 at 6:21 PM           Vital Signs:   Blood pressure (!) 117/96, pulse 78, temperature 98.2 °F (36.8 °C), temperature source Temporal, resp. rate 19, height 5' 5\" (1.651 m), weight (!) 140.2 kg (309 lb 1.4 oz), SpO2 97%, not currently breastfeeding.    Mental Status Exam:   Appearance: Stated age female, in hospital gown, laying down in hospital bed.  Psychomotor: Episodes of restlessness and agitation.  Orientation: Alert and oriented to person. Patient confused  Gait: Not evaluated.  Attitude/Coorperation: limited cooperation   Behavior: episodes of restlessness and agitation.  Speech: soft, incomprehensible. .  Mood: anxious  Affect: restricted  Thought process: Confused, disorganized  Thought content: No reports of  suicidal or homicidal ideation.  Perceptions: Patient has been demonstrating response to internal stimuli.  Concentration: Grossly impaired  Memory: Grossly impaired  Intellect: Average.  Judgment and Insight: Questionable.     Impression:   Delirium  Agitation    Dissection of ascending aorta (HCC)    NAIMA (acute  kidney injury) (HCC)    Stage 3 chronic kidney disease (HCC)    Acute respiratory failure with hypoxia (HCC)    Hypervolemia    Ileus (HCC)      Patient is a 59 year old -American, single female with past medical history of CKD, hypertension, high cholesterol who was admitted to the hospital for aortic dissection. The patient has been demonstrating alternation in mood and cognition with episodes of increased restlessness and agitation. According to team, they are trying to wean patient of propofol and precedex.     12/13/2024: patient remains on precedex. Continues to have episodes of increased restlessness and agitation. Haldol given this morning.     Discussed risk and benefit, acknowledging the current symptom and severity.  At this point, I would recommend the following approach:     Focus on safety  Focus on education and support.  Focus on insight orientation helping the patient understand diagnosis and treatment plan.  Continue ativan 1 mg IV TID  Utilize ativan 2 mg IV q 4 hours PRN  Utilize haldol 2 mg IV q 6 hours PRN  Processed with patient at length, the initiation of the above psychotropic medications I advised the patient of the risks, benefits, alternatives and potential side effects. The patient consents to administration of the medications and understands the right to refuse medications at any time. The patient verbalized understanding.   Coordinate plan with team    Orders This Visit:  Orders Placed This Encounter   Procedures    Troponin I (High Sensitivity)    CBC With Differential With Platelet    Basic Metabolic Panel (8)    Hepatic Function Panel (7)    PTT, Activated    Prothrombin Time (PT)    Pro Beta Natriuretic Peptide    Open heart surgical profile    CBC, Platelet; No Differential    CBC With Differential With Platelet    Prothrombin Time (PT)    PTT, Activated    Fibrinogen Activity    Expanded Arterial Blood Gas    Expanded Arterial Blood Gas    Arterial blood gas    CBC,  Platelet; No Differential    Basic Metabolic Panel (8)    Magnesium    Comp Metabolic Panel (14)    Magnesium    Expanded Arterial Blood Gas    Arterial blood gas    CBC, Platelet; No Differential    Magnesium    Renal Function Panel    Urinalysis, Routine    Microalb/Creat Ratio, Random Urine    Comp Metabolic Panel (14)    Magnesium    CBC With Differential With Platelet    Phosphorus    CBC, Platelet; No Differential    Heparin Induced Platelet    Expanded Arterial Blood Gas    Magnesium    Renal Function Panel    Lipase    Amylase    Lactic Acid, Plasma    Basic Metabolic Panel (8)    Potassium    Lipid Panel    Basic Metabolic Panel (8)    CBC, Platelet; No Differential    CBC, Platelet; No Differential    Magnesium    Renal Function Panel    CBC, Platelet; No Differential    Triglycerides    Magnesium    CBC With Differential With Platelet    Renal Function Panel    Manual differential    CBC With Differential With Platelet    Procalcitonin    Manual differential    CBC With Differential With Platelet    Renal Function Panel    Magnesium    Manual differential    REDRAW RENAL    Magnesium    CBC With Differential With Platelet    Comp Metabolic Panel (14)    Iron And Tibc    Phosphorus    Manual differential    Expanded Arterial Blood Gas    Magnesium    CBC With Differential With Platelet    Renal Function Panel    PTT, Activated    Prothrombin Time (PT)    Manual differential    CBC, Platelet; No Differential    Comp Metabolic Panel (14)    Lipid Panel    Expanded Arterial Blood Gas    Basic Metabolic Panel (8)    CBC, Platelet; No Differential    Magnesium    Renal Function Panel    CBC With Differential With Platelet    Magnesium    Renal Function Panel    CBC With Differential With Platelet    Potassium    TSH W Reflex To Free T4    Magnesium    CBC With Differential With Platelet    Renal Function Panel    Scan slide    Magnesium    Phosphorus    CBC With Differential With Platelet    Comp Metabolic  Panel (14)    REDRAW CMP (P)    Basic Metabolic Panel (8)    CBC, Platelet; No Differential    Basic Metabolic Panel (8)    CBC, Platelet; No Differential    Basic Metabolic Panel (8)    REDRAW BMP    Potassium    Basic Metabolic Panel (8)    CBC With Differential With Platelet    Scan slide    Potassium    RBC Morphology Scan    Magnesium    Phosphorus    Basic Metabolic Panel (8)    Comp Metabolic Panel (14)    Magnesium    Phosphorus    Triglycerides    Triglycerides    Potassium    CBC, Platelet; No Differential    CBC, Platelet; No Differential    Potassium    Type and screen    ABORH (Blood Type)    Antibody Screen    ABORH Confirmation    Prepare platelets Once    Prepare fresh frozen plasma Once    Prepare RBC STAT    Prepare cryoprecipitate Once    Type and screen    Prepare RBC Once    ABORH (Blood Type)    Antibody Screen    Type and screen    Prepare RBC STAT    ABORH (Blood Type)    Antibody Screen    Specimen to Pathology Tissue    Rapid SARS-CoV-2 by PCR    MRSA Screen by PCR    Sputum culture       Meds This Visit:  Requested Prescriptions      No prescriptions requested or ordered in this encounter       Flor Jernigan, APRN  12/13/2024    Note to Patient: The 21st Century Cures Act makes medical notes like these available to patients in the interest of transparency. However, be advised this is a medical document. It is intended as peer to peer communication. It is written in medical language and may contain abbreviations or verbiage that are unfamiliar. It may appear blunt or direct. Medical documents are intended to carry relevant information, facts as evident, and the clinical opinion of the practitioner. This note may have been transcribed using a voice dictation system. Voice recognition errors may occur. This should not be taken to alter the content or meaning of this note.           [1]   Allergies  Allergen Reactions    Atorvastatin MYALGIA    Pravastatin MYALGIA    Rosuvastatin MYALGIA     Seasonal Runny nose

## 2024-12-13 NOTE — PROGRESS NOTES
[facility]  part of [facility]    Cardiology Progress Note    Alisha Wilde Patient Status:  Inpatient    10/25/1965 MRN [id number]   Location [facility] 2W/SW Attending Aguila Villegas MD   Hosp Day # 21 PCP Bridger Avendano MD     Interval History   BP better controlled, on nicardipine + labetalol gtt  Remains on precedex, off propofol   Assessment and Plan:   Acute hypoxic respiratory failure  Suspected ileus, associated with fecal matter aspiration and emesis  Type A aortic dissection  NAIMA on CKD-III, improved  Obesity  EtOH abuse  -presented with acute onset CP   -CT with type A aortic dissection above right coronary artery and extending distally into the left common iliac artery and external iliac   -s/p emergent successful repair on 24  -had aspiration event following self-extubation, re-intubated with multiple failed SBT; now s/p trach/PEG tube   -on broad spectrum Abx and NPO again due to fecal matter emesis and ileus     Hypertension  -TTE on  with hyperdynamic LVEF  -continue BP management - substantial component of agitation worsening BP now that she is actively undergoing sedation wean   -NPO after aspiration event and worsening ileus   -holding coreg from 37.5 mg, clonidine  0.3 mg 3 times daily, combination hydralazine + isosorbide 150mg-40mg TID, amlodipine 10 mg daily   -continue nicardipine gtt   -continue labetalol gtt   -continue clonidine patch  -monitor rhythm on tele    Objective:   Patient Vitals for the past 24 hrs:   BP Temp Temp src Pulse Resp SpO2 Weight   24 1500 119/66 -- -- 70 18 92 % --   24 1400 124/66 -- -- 71 18 95 % --   24 1300 122/66 -- -- 68 18 97 % --   24 1200 124/68 98.4 °F (36.9 °C) Axillary 68 18 97 % --   24 1100 127/75 -- -- 69 18 98 % --   24 1000 129/71 -- -- 69 18 97 % --   24 0900 128/69 -- -- 70 18 97 % --   24 0800 (!) 143/118 97.7 °F (36.5 °C) Temporal 70 18 98 % --    11/26/24 0700 130/70 -- -- 69 18 99 % --   11/26/24 0621 -- -- -- -- -- -- 282 lb 3 oz (128 kg)   11/26/24 0600 95/53 -- -- 68 21 97 % --   11/26/24 0500 121/67 -- -- 70 18 97 % --   11/26/24 0400 119/68 98.4 °F (36.9 °C) Temporal 72 18 98 % --   11/26/24 0200 109/60 -- -- 72 21 96 % --   11/26/24 0130 -- -- -- 72 18 97 % --   11/26/24 0100 142/73 -- -- 71 18 98 % --   11/26/24 0030 -- -- -- 71 18 99 % --   11/26/24 0000 139/70 98.3 °F (36.8 °C) Temporal 70 18 98 % --   11/25/24 2300 130/69 -- -- 72 18 98 % --   11/25/24 2200 135/70 -- -- 71 18 98 % --   11/25/24 2100 138/71 -- -- 71 18 99 % --   11/25/24 2000 132/68 98.1 °F (36.7 °C) Temporal 69 18 99 % --   11/25/24 1900 124/64 -- -- 71 18 98 % --   11/25/24 1800 136/72 -- -- 71 18 98 % --   11/25/24 1700 138/70 -- -- 70 18 98 % --       Intake/Output:   Last 3 shifts:   Intake/Output                   11/24/24 0700 - 11/25/24 0659 11/25/24 0700 - 11/26/24 0659 11/26/24 0700 - 11/27/24 0659       Intake    P.O.  0  0  --    P.O. 0 0 --    I.V.  2276.7  1884.9  --    I.V. 154 615 --    Volume (mL) Insulin 53.9 37 --    Volume (mL) Nitroglycerin 236.9 54.5 --    Volume (mL) Dobutamine 4.5 -- --    Volume (mL) Propofol 477.8 713.9 --    Volume (mL) Dexmedetomidine 352.2 384.5 --    Volume (mL) (dextrose 5%-sodium chloride 0.45% infusion) 997.4 80 --    NG/GT  50  150  60    Intake (mL) (NG/OG Tube Orogastric 16 Fr. Right mouth) 50 150 60    IV PIGGYBACK  600  500  --    Volume (mL) (piperacillin-tazobactam (Zosyn) 3.375 g in dextrose 5% 100 mL IVPB-ADDV) 300 200 --    Volume (mL) (acetaminophen (Ofirmev) 10 mg/mL infusion premix 1,000 mg) 200 100 --    Volume (mL) (potassium chloride 20 mEq/100mL IVPB premix 20 mEq) -- 100 --    Volume (mL) (potassium chloride 40 mEq/100mL IVPB premix (central line) 40 mEq) 100 100 --    Total Intake 2926.7 2534.9 60       Output    Urine  1800  3570  800    Output (mL) (Urethral Catheter Latex;Temperature probe;Double-lumen) 1800  3570 800    Emesis/NG output  550  365  --    Residual volume (ml) (NG/OG Tube Orogastric 16 Fr. Right mouth) -- 5 --    Output (mL) (NG/OG Tube Orogastric 16 Fr. Right mouth) 550 360 --    Chest Tube  188  125  30    Output (mL) ([REMOVED] Chest Tube Anterior Mediastinal 32 Fr.) 48 50 --    Output (mL) (Chest Tube Anterior Pericardial 24 Fr.) 140 75 30    Total Output 2538 4060 830       Net I/O     388.7 -1525.1 -770             Vent Settings: Vent Mode: PRVC/AC  FiO2 (%):  [30 %] 30 %  S RR:  [18] 18  S VT:  [550 mL] 550 mL  PEEP/CPAP (cm H2O):  [5 cm H20] 5 cm H20  MAP (cm H2O):  [10-14] 12    Hemodynamic parameters (last 24 hours):      Scheduled Meds:    potassium chloride  40 mEq Intravenous Once    LORazepam  1 mg Intravenous TID    cloNIDine  1 patch Transdermal Weekly    metoprolol  5 mg Intravenous Q6H    piperacillin-tazobactam  3.375 g Intravenous Q8H    vancomycin  12.5 mg/kg Intravenous Q24H    carvedilol  37.5 mg Oral BID with meals    aspirin  81 mg Peg Tube Daily    isosorbide dinitrate  40 mg Per NG Tube TID (Nitrates)    hydrALAZINE  150 mg Oral Q8H GABRIEL    amLODIPine  10 mg Oral Daily    heparin  5,000 Units Subcutaneous Q8H GABRIEL    chlorhexidine gluconate  15 mL Mouth/Throat BID    famotidine  20 mg Intravenous Daily       Continuous Infusions:    adult 3 in 1 TPN      labetalol (Trandate) 200 mg in sodium chloride 0.9% 200 mL infusion 0.5 mg/min (12/13/24 1200)    dextrose 10%      adult 3 in 1 TPN 75 mL/hr at 12/12/24 2208    dexmedetomidine 0.9 mcg/kg/hr (12/13/24 1200)    niCARdipine 15 mg/hr (12/13/24 1200)       Results:     Lab Results   Component Value Date    WBC 6.5 12/13/2024    HGB 7.3 (L) 12/13/2024    HCT 23.0 (L) 12/13/2024    .0 12/13/2024    CREATSERUM 1.28 (H) 12/13/2024    BUN 27 (H) 12/13/2024     (H) 12/13/2024    K 3.4 (L) 12/13/2024    K 3.4 (L) 12/13/2024     (H) 12/13/2024    CO2 21.0 12/13/2024     (H) 12/13/2024    CA 8.4 (L) 12/13/2024    ALB  3.3 12/13/2024    ALKPHO 73 12/13/2024    BILT 0.5 12/13/2024    TP 5.8 12/13/2024    AST 15 12/13/2024    ALT 16 12/13/2024    PTT 30.7 12/04/2024    INR 1.17 12/04/2024    TSH 2.085 12/07/2024    NATHALIE 99 11/25/2024    LIP 28 11/25/2024    DDIMER 0.42 12/08/2019    ESRML 15 06/05/2021    MG 2.4 12/13/2024    PHOS 2.6 12/13/2024    TROP <0.045 12/08/2019     (H) 09/23/2021    B12 1,156 (H) 07/23/2018       Recent Labs   Lab 12/11/24  0547 12/11/24  0759 12/12/24  0443 12/12/24  1825 12/13/24  0612   *  --  122*  --  151*   BUN  --  41* 33*  --  27*   CREATSERUM 1.85*  --  1.44*  --  1.28*   CA 8.4*  --  8.8  --  8.4*     --  146*  --  149*   K  --  3.4* 3.2*  3.2* 3.2* 3.4*  3.4*   *  --  116*  --  118*   CO2 17.0*  --  19.0*  --  21.0     Recent Labs   Lab 12/08/24  0637 12/09/24  0609 12/10/24  0607 12/11/24  0547 12/12/24  0443 12/13/24  0904   RBC 3.84 3.18*   < > 2.94* 2.95* 2.67*   HGB 10.9* 9.0*   < > 8.2* 8.3* 7.3*   HCT 33.5* 27.7*   < > 25.4* 25.3* 23.0*   MCV 87.2 87.1   < > 86.4 85.8 86.1   MCH 28.4 28.3   < > 27.9 28.1 27.3   MCHC 32.5 32.5   < > 32.3 32.8 31.7   RDW 17.9* 17.3*   < > 17.4* 16.8* 17.0*   NEPRELIM 6.94 7.06  --   --  3.64  --    WBC 9.9 10.2   < > 6.4 6.5 6.5   .0 165.0   < > 369.0 275.0 240.0    < > = values in this interval not displayed.       No results for input(s): \"BNPML\" in the last 168 hours.    No results for input(s): \"TROP\", \"CK\" in the last 168 hours.    XR ABDOMEN (1 VIEW) (CPT=74018)    Result Date: 12/12/2024  CONCLUSION:  1. Nonspecific small bowel dilatation left mid abdomen and central abdomen measures up to 4.5 cm. 2. Mild stool scattered throughout the colon suggests constipation fecal retention.     Dictated by (CST): Woodrow Fischer MD on 12/12/2024 at 1:58 PM     Finalized by (CST): Woodrow Fischer MD on 12/12/2024 at 2:01 PM          XR CHEST AP PORTABLE  (CPT=71045)    Result Date: 12/12/2024  CONCLUSION:  1.  Cardiomegaly.  Tortuous aorta. 2. Bilateral mixed multifocal airspace consolidation with slight improved aeration left lung base. 3. Tracheostomy tube overlies the tracheal air column.  Right PICC line with the tip in the SVC.     Dictated by (CST): Woodrow Fischer MD on 12/12/2024 at 11:28 AM     Finalized by (CST): Woodrow Fischer MD on 12/12/2024 at 11:31 AM          CT ABDOMEN+PELVIS(CPT=74176)    Result Date: 12/10/2024  CONCLUSION:  1. Fluid-filled loops of small bowel, which could reflect underlying infectious/inflammatory enteritis or malabsorption in the appropriate clinical setting. Bowel loops are mildly dilated without clear transition point to suggest mechanical bowel obstruction, although early or partial bowel obstruction could have a similar appearance.  2. Extensive distal colonic diverticulosis without CT evidence of acute complication.   3. Bladder distension despite Webb catheter placement.  4. Hepatomegaly with hepatic steatosis.  5. Diffuse anasarca.  6. Partially visualized postthoracotomy chest with possible postoperative seroma/hematoma of the anterior chest wall.  7. Bilateral pleural effusions and associated basilar atelectasis, with or without superimposed pneumonia.  8. Additional scattered patchy nodular opacities may reflect infectious process.   9. Low-density appearance of the intracardiac blood pool raises the possibility of underlying anemia. Correlate with hematologic parameters.  10. Lesser incidental findings as above.    Dictated by (CST): Eulalio Shaikh MD on 12/10/2024 at 6:12 PM     Finalized by (CST): Eulalio Shaikh MD on 12/10/2024 at 6:21 PM         EKG 12 Lead    Result Date: 12/12/2024  Normal sinus rhythm Nonspecific T wave abnormality Abnormal ECG When compared with ECG of 23-NOV-2024 04:39, Nonspecific T wave abnormality now evident in Anterior-lateral leads Confirmed by landon shankar ([id number]) on 12/12/2024 3:52:29 PM          Exam:     Physical  Exam:  General: awake/alert  HEENT: +trach  Neck: No JVD, carotids 2+, no bruits.  Cardiac: Regular rate and rhythm. S1, S2 normal.   Lungs: +rhonchi   Abdomen: Soft, non-tender.BS-present.  Extremities: Without clubbing or cyanosis. + BLE edema.    Neurologic: unable to obtain  Skin: Warm and dry.     Edward Montgomery, [facility]

## 2024-12-13 NOTE — PROGRESS NOTES
Received trach pt on full vent support. Pt later placed on SBT 5/5, 30% & pt displayed the followin24 1134 24 1204   Spontaneous Parameters   Spontaneous RR Rate 18 32   Spontaneous Minute Volume 11.5  --    Average Spontaneous Tidal Volume 530 520   $ Spontaneous Vital Capacity 0.99  --    Negative Inspiratory Force -35  --    Total RSBI 28 66     After 30 mins on SBT pt became tachypneic with labored respirations. Pt also complained of dyspnea. Pt placed back on full support after 30 mins, d/w Dr Rainey. Trach care performed, trach ties remain secure. RT to continue to monitor.

## 2024-12-13 NOTE — PROGRESS NOTES
Progress Note     Alisha Wilde Patient Status:  Inpatient    10/25/1965 MRN [id number]   Location [facility] 2W/SW Attending Radha Tidwell MD   Hosp Day # 21 PCP Bridger Avendano MD     Chief Complaint: patient presented with   Chief Complaint   Patient presents with    Chest Pain Angina       Subjective:   S: : trach, plan for possible NG tube by GI , PEG with stool therefore to LIS, weaning sedation   : pt is off propofol, on Precedex, continue to have ileus   : pt precedex is weaning, following simple commands     Review of Systems:   10 point ROS completed and was negative, except for pertinent positive and negatives stated in subjective.    Objective:   Vital signs:  Temp:  [98.2 °F (36.8 °C)-100.2 °F (37.9 °C)] 98.2 °F (36.8 °C)  Pulse:  [75-93] 77  Resp:  [15-27] 19  BP: (130-175)/(52-74) 134/56  SpO2:  [96 %-100 %] 98 %  FiO2 (%):  [30 %] 30 %    Wt Readings from Last 6 Encounters:   24 (!) 309 lb 1.4 oz (140.2 kg)   10/24/24 254 lb (115.2 kg)   24 249 lb (112.9 kg)   24 249 lb (112.9 kg)   23 249 lb (112.9 kg)   10/09/23 248 lb (112.5 kg)         Physical Exam:    General: No acute distress. , Tracheostomy in place,     , morbidly obese  Respiratory: Clear to auscultation bilaterally. No wheezes. No rhonchi.  Cardiovascular: S1, S2. Regular rate and rhythm. No murmurs, rubs or gallops.   Abdomen: Soft, nontender, nondistended.  Positive bowel sounds. No rebound or guarding.  Neurologic: No focal neurological deficits.   Musculoskeletal: Moves all extremities.  Extremities: No edema.    Results:   Diagnostic Data:      Labs:    Labs Last 24 Hours:   BMP     CBC    Other     Na 149 Cl 118 BUN 27 Glu 151   Hb 7.3   PTT - Procal -   K 3.4; 3.4 CO2 21.0 Cr 1.28   WBC 6.5 >< .0  INR - CRP -   Renal Lytes Endo    Hct 23.0   Trop - D dim -   eGFR - Ca 8.4 POC Gluc  153    LFT   pBNP - Lactic -   eGFR AA - PO4 2.6 A1c -   AST 15 APk 73 Prot 5.8  LDL -     Mg  2.4 TSH -   ALT 16 T miranda 0.5 Alb 3.3        COVID-19 Lab Results    COVID-19  Lab Results   Component Value Date    COVID19 Not Detected 11/21/2024    COVID19 Not Detected 08/10/2023    COVID19 Not Detected 05/13/2022       Pro-Calcitonin  No results for input(s): \"PCT\" in the last 168 hours.    Cardiac  No results for input(s): \"TROP\", \"PBNP\" in the last 168 hours.    Creatinine Kinase  No results for input(s): \"CK\" in the last 168 hours.    Inflammatory Markers  No results for input(s): \"CRP\", \"NATALIA\", \"LDH\", \"DDIMER\" in the last 168 hours.    Imaging: Imaging data reviewed in Epic.    IMAGES:   CT A/P 12/10/24  CONCLUSION:   1. Fluid-filled loops of small bowel, which could reflect underlying infectious/inflammatory enteritis or malabsorption in the appropriate clinical setting. Bowel loops are mildly dilated without clear transition point to suggest mechanical bowel   obstruction, although early or partial bowel obstruction could have a similar appearance.   2. Extensive distal colonic diverticulosis without CT evidence of acute complication.    3. Bladder distension despite Webb catheter placement.   4. Hepatomegaly with hepatic steatosis.   5. Diffuse anasarca.   6. Partially visualized postthoracotomy chest with possible postoperative seroma/hematoma of the anterior chest wall.   7. Bilateral pleural effusions and associated basilar atelectasis, with or without superimposed pneumonia.   8. Additional scattered patchy nodular opacities may reflect infectious process.    9. Low-density appearance of the intracardiac blood pool raises the possibility of underlying anemia. Correlate with hematologic parameters.   10. Lesser incidental findings as above.      CXR 12/9/24  CONCLUSION:   1. Cardiomegaly.  Atherosclerotic aneurysmal thoracic aorta   2. Tracheostomy tube overlies tracheal air column.   3.  Bilateral mixed alveolar and interstitial multifocal airspace opacification has progressed.        KUB  12/9/24  CONCLUSION:   No huang dilatation of the small bowel to suggest small bowel obstruction.   Large cecal stool burden which may indicate constipation.  Mild stool burden throughout the remainder of the colon.      CXR 12/5/24  No significant change when compared to 12/04/2024.      CXR 12/4/24  CONCLUSION:   1. Mild cardiomegaly and minimally prominent pulmonary vascularity but no huang pulmonary edema.  ET tube and NG tubes have been removed.  Tracheostomy is now noted in the trachea extending to the level the clavicles.  No pneumothorax.   2. Persistent patchy bilateral upper lobe airspace disease right more than left suggesting persistent bilateral upper lobe pneumonia.  Correlate clinically and continued follow-up is advised.      CXR 12/2/24  FINDINGS:   POSITION: The patient is semi-erect and slightly rotated to the right.   DEVICES: There is an endotracheal tube terminating approximately 3.9 cm above the jenynfer.  A large-bore right internal jugular central venous vascular access sheath has tip projecting in the SVC. An enteric tube extends caudally beyond the field of view.   CARDIAC/VASC: The cardiomediastinal silhouette is accentuated by AP technique, but likely stably enlarged. There is mild tortuosity of the thoracic aorta with peripheral atherosclerotic vascular calcification. The pulmonary vascularity is within normal   limits.   MEDIAST/HAMLET: Surgical clips are present.   LUNGS/PLEURA: Elevation right hemidiaphragm is noted. Multifocal patchy airspace consolidation is demonstrated, slightly worse on the right side than left. Mild interstitial opacities are seen. Additional scattered reticular opacities may be atelectatic   in nature. No large pleural effusion or pneumothorax is evident.    BONES: Median sternotomy wires are demonstrated. Mild degenerative changes of the thoracic spine are apparent. There is no fracture or visible bony lesion.   OTHER: There may be surgical clips at the level of  the thoracic inlet. Leads overlie the chest and obscure underlying detail      CXR 11/27/24  Moderate pulmonary edema, progressed since 11/26/2024.      CT c/a/p  CONCLUSION:  Low-lying ETT, 0.8 cm above the jennyfer   Multifocal bilateral pulmonary nodular consolidation s     CXR 11/24/24  FINDINGS:   CARDIAC/VASC: The cardiac silhouette is exaggerated by AP portable technique. There is stable central pulmonary venous congestion.   MEDIAST/HAMLET:   No visible mass or adenopathy.   LUNGS/PLEURA: There are stable streaky opacities at the right lung base.  No pleural effusion or pneumothorax.   BONES: Sternotomy changes are again noted.   OTHER: An endotracheal tube tip projects 3.8 cm above the jennyfer.  An enteric tube again courses into the left upper quadrant.  A right internal jugular approach [place]-Dev catheter tip again projects over the pulmonary outflow tract.  A mediastinal drain tip again projects over the right suprahilar region.      CXR 11/23/24  On my read possible RML infiltrates  CONCLUSION: Post emergent acute type A aortic dissection repair.  Stable cardiomegaly.  Gross stable/satisfactory position of lines and tubes.  Mild pulmonary vascular congestion.       CXR 11/22/24  CONCLUSION: Post feeding tube placement with tip in the distal esophagus approximately 4 cm above the gastroesophageal junction.  Recommend advancement of the tube by approximately 10 cm to place it in the stomach.      CXR 11/22/24  CARDIAC/MEDIASTINUM: The cardiac silhouette is enlarged and unchanged.. There is a right internal jugular [place]-Dev catheter with tip at the level of the main pulmonary artery. There is a right-sided chest tube. Endotracheal tube with tip approximately    5.3 cm above the jennyfer. There is a nasogastric tube with tip and sidehole within the stomach and below the diaphragm. There are median sternotomy wires and postoperative changes of ascending aortic repair.   LUNGS: There is pulmonary vascular  redistribution without edema. Minimal blunting of the bilateral costophrenic angles may be secondary to trace pleural effusions versus chronic pleural thickening. There is mild bibasilar atelectasis. No pneumothorax.   BONES: There is degenerative disease of the thoracic spine.      Chest CTA 11/22/24  CONCLUSION:   1. Type a aortic dissection beginning just above right renal (correction:  Right coronary)  artery and extending distally into the left common iliac artery and external iliac.  Findings were called to Dr. Echavarria at 5:52 p.m.    Medications:    potassium chloride  40 mEq Intravenous Once    furosemide  40 mg Intravenous Once    LORazepam  1 mg Intravenous TID    cloNIDine  1 patch Transdermal Weekly    metoprolol  5 mg Intravenous Q6H    piperacillin-tazobactam  3.375 g Intravenous Q8H    vancomycin  12.5 mg/kg Intravenous Q24H    carvedilol  37.5 mg Oral BID with meals    aspirin  81 mg Peg Tube Daily    isosorbide dinitrate  40 mg Per NG Tube TID (Nitrates)    hydrALAZINE  150 mg Oral Q8H GABRIEL    amLODIPine  10 mg Oral Daily    heparin  5,000 Units Subcutaneous Q8H GABRIEL    chlorhexidine gluconate  15 mL Mouth/Throat BID    famotidine  20 mg Intravenous Daily       Assessment & Plan:   ASSESSMENT / PLAN:     Problem List Items Addressed This Visit          HCC Problems    Dissection of ascending aorta (HCC) - Primary    Relevant Medications    sodium chloride 0.9% infusion 1,000 mL (Completed)    prothrombin complex conc (human) (Kcentra) 1,563 Units in 60 mL infusion (Completed)    furosemide (Lasix) 10 mg/mL injection 20 mg (Completed)    heparin (Porcine) 5000 UNIT/ML injection 5,000 Units    amLODIPine (Norvasc) tab 10 mg    amLODIPine (Norvasc) tab 5 mg (Completed)    sodium chloride 0.9% infusion (Completed)    hydrALAZINE (Apresoline) tab 50 mg (Completed)    furosemide (Lasix) 10 mg/mL injection 40 mg (Completed)    labetalol (Trandate) 5 mg/mL injection (Completed)    hydrALAZINE (Apresoline) tab  150 mg    isosorbide dinitrate (Isordil) tab 40 mg    sodium chloride 0.9% infusion (Completed)    furosemide (Lasix) 10 mg/mL injection 40 mg (Completed)    niCARdipine (carDENE) 125 mg in sodium chloride 0.9% 250 mL infusion    furosemide (Lasix) 10 mg/mL injection 40 mg (Completed)    cloNIDine (Catapres) tab 0.3 mg (Completed)    cloNIDine (Catapres) tab 0.3 mg (Completed)    aspirin chewable tab 81 mg    carvedilol (Coreg) tab 37.5 mg    hydrALAzine (Apresoline) 20 mg/mL injection 10 mg    dexmedeTOMIDine (Precedex) 800 mcg in sodium chloride 0.9% 100 mL infusion    furosemide (Lasix) 10 mg/mL injection 40 mg (Completed)    cloNIDine (Catapres) 0.3 MG/24HR patch 1 patch    metoprolol (Lopressor) 5 mg/5mL injection 5 mg    labetalol (Trandate) 200 mg in sodium chloride 0.9% 200 mL infusion    furosemide (Lasix) 10 mg/mL injection 40 mg (Completed)    adult 3 in 1 TPN    furosemide (Lasix) 10 mg/mL injection 40 mg     Other Visit Diagnoses       Aortic anomaly (HCC)        Relevant Medications    atropine 0.1 MG/ML injection 1 mg (Completed)    magnesium sulfate in dextrose 5% 1 g/100mL infusion premix 1 g    magnesium sulfate in sterile water for injection 2 g/50mL IVPB premix 2 g    furosemide (Lasix) 10 mg/mL injection 20 mg (Completed)    heparin (Porcine) 5000 UNIT/ML injection 5,000 Units    amLODIPine (Norvasc) tab 10 mg    amLODIPine (Norvasc) tab 5 mg (Completed)    hydrALAZINE (Apresoline) tab 50 mg (Completed)    furosemide (Lasix) 10 mg/mL injection 40 mg (Completed)    labetalol (Trandate) 5 mg/mL injection (Completed)    hydrALAZINE (Apresoline) tab 150 mg    lidocaine PF (Xylocaine-MPF) 1% injection (Completed)    isosorbide dinitrate (Isordil) tab 40 mg    furosemide (Lasix) 10 mg/mL injection 40 mg (Completed)    niCARdipine (carDENE) 125 mg in sodium chloride 0.9% 250 mL infusion    ceFAZolin (Ancef) 3 g in sodium chloride 0.9% 100mL IVPB premix (Completed)    furosemide (Lasix) 10 mg/mL  injection 40 mg (Completed)    cloNIDine (Catapres) tab 0.3 mg (Completed)    cloNIDine (Catapres) tab 0.3 mg (Completed)    aspirin chewable tab 81 mg    carvedilol (Coreg) tab 37.5 mg    vancomycin (Vancocin) 1.75 g in sodium chloride 0.9% 500mL IVPB premix    hydrALAzine (Apresoline) 20 mg/mL injection 10 mg    furosemide (Lasix) 10 mg/mL injection 40 mg (Completed)    lidocaine (cardiac) (Xylocaine) 20 mg/mL injection (Completed)    atropine 0.1 MG/ML injection (Completed)    EPINEPHrine (Adrenalin) 1 MG/10ML (0.1 MG/ML) injection (Cardiac Arrest) (Completed)    cloNIDine (Catapres) 0.3 MG/24HR patch 1 patch    metoprolol (Lopressor) 5 mg/5mL injection 5 mg    labetalol (Trandate) 200 mg in sodium chloride 0.9% 200 mL infusion    furosemide (Lasix) 10 mg/mL injection 40 mg (Completed)    adult 3 in 1 TPN    furosemide (Lasix) 10 mg/mL injection 40 mg    Other Relevant Orders    Specimen to Pathology Tissue (Completed)            60 y/o Morbid obesity, hypertension, gastroesophageal reflux disease, chronic kidney disease stage 2 to 3, and hyperlipidemia admitted on 11/21 for Chest pain -CT CT angiogram of the chest, abdomen, and pelvis rule out dissection showed type A aortic dissection s/p emergent repair 11/22/24 , transferred to ICU post op intubated on 11/22, failed SBT, s/p trach 12/4,  PEG 12/5, completed 10 days of abx zsecondary to aspiration event , no with ileus and Abx Vanc and Zosyn resumed for aspiration pneumonia           Ileus   -Gen surgery and GI following   -rpt Abd xray results pending   -PEG tube to LIS   -possibly will need NG   -Continue IV abx   12/13-PEG tube drainage improved         Type A aortic dissection   Ileus   S/p emergent repair 11/22/24   CT chest abdomen and pelvis shows bilateral pulmonary nodular consolidation- no focal fluid collection- completed IV antibiotics- continue to monitor off of them   On IV Reglan- montior QT interval   Antihypertensives being slowly added now  that PEG is in place-tube feeds held due to secretions and bowel burden  Continue bowel regimen- miralax bid, tap water enemas- GI on board   Unfortunately stool is now coming out of the peg tube, trach collar and suctioning  Will order another CT scan to see if there is possible obstruction  Will most likely need to place NG tube- discussed with surgery and nursing   Add scopolamine patch   New picc 12/2  CV surgery, cards and critical care on consult.   TTE LVEF 75-80%.   Cont BP control per Cards  -s/p pRBC transfusion on 12/4 with repeat hg stable          - Plan is to eventually discharge to LTAC  PEG tube to low intermittent suction per GI- TFs stopped     Acute hypoxic respiratory failure   Aspiration event   Completed 10 days of zosyn.   Sputum cx candida   Failed SBT multiple times. Plans for trach tomorrow. PEG 12/5   ENT and GI on consult.  Pulmonary on consult.   CXR with multifocal airspace dz  Albuterol nebs   Status post trach placement 12/4  Status post PEG placement  12/5- resume tube feeds   Seroquel started to help with agitation 50 mg BID  On precedex and propofol- unsucessful weaning due to agitation and thick secretions   12/12:  off propofol, on precedex. Psych consulted   12/13 weaning precedex, psych recommends Haldol and ativan PRN       H/o tobacco and ETOH abuse   Duonebs PRN   CIWA protocol  NAIMA on CKD III   Renal on consult.   Almond to be secondary to MARY LOU/ATN   Now on lasix drip to 10 mg/hour   Doppler renal ultrasound unremarkable for renal artery stenosis  If negative Renal will add minoxidil    Essential HTN   Coreg 25 mg BID, norvasc 10mg daily, hydralazine 150mg TID. Clonidinie patch 0.2mg TID   Add hydralazine combination with isosobide 150-40 mg TID   IV lasix given- had 1 liter of urine output - now changed to lasix drip   Webb in place   ARB on hold due to NAIMA.   Iron def anemia  IV iron.   Iron level low   Transfuse for Hb < 7.0   Other medical problems  Morbid Obesity   TANYA      12/12: spoke to sister Mariajose , notify her of current clinical conditions, current treatment and plan. Answered all questions.     Quality:  DVT Prophylaxis: Heparin   CODE status: FULL   DISPO: pending clinical improvement.   Estimated date of discharge: To be determined  Discharge is dependent on: Improved clinical status  At this point Patient is expected to be discharge to: Home versus rehab      Plan of care discussed with Patient and RN.     Coordinated care with providers and counseling re: treatment plan and workup     Radha Tidwell MD    Supplementary Documentation:          I personally reviewed the available laboratories, imaging including operative report. I discussed/will discuss the case with patient and her nurse. I ordered laboratories studies. I adjusted medications including not applicable today. Medical decision making high, risk is high.     >55min spent, >50% spent counseling and coordinating care in the form of educating pt/family and d/w consultants and staff. Most of the time spent discussing the above plan.

## 2024-12-14 ENCOUNTER — APPOINTMENT (OUTPATIENT)
Dept: GENERAL RADIOLOGY | Facility: HOSPITAL | Age: 59
End: 2024-12-14
Attending: INTERNAL MEDICINE
Payer: COMMERCIAL

## 2024-12-14 ENCOUNTER — APPOINTMENT (OUTPATIENT)
Dept: GENERAL RADIOLOGY | Facility: HOSPITAL | Age: 59
DRG: 003 | End: 2024-12-14
Attending: INTERNAL MEDICINE
Payer: COMMERCIAL

## 2024-12-14 PROBLEM — E87.0 HYPERNATREMIA: Status: ACTIVE | Noted: 2024-12-14

## 2024-12-14 PROBLEM — F05 DELIRIUM DUE TO ANOTHER MEDICAL CONDITION: Status: ACTIVE | Noted: 2024-12-12

## 2024-12-14 LAB
ANION GAP SERPL CALC-SCNC: 9 MMOL/L (ref 0–18)
ANTIBODY SCREEN: NEGATIVE
BUN BLD-MCNC: 26 MG/DL (ref 9–23)
BUN/CREAT SERPL: 20.3 (ref 10–20)
CALCIUM BLD-MCNC: 8.4 MG/DL (ref 8.7–10.4)
CHLORIDE SERPL-SCNC: 117 MMOL/L (ref 98–112)
CO2 SERPL-SCNC: 22 MMOL/L (ref 21–32)
CREAT BLD-MCNC: 1.28 MG/DL
DEPRECATED RDW RBC AUTO: 52.9 FL (ref 35.1–46.3)
EGFRCR SERPLBLD CKD-EPI 2021: 48 ML/MIN/1.73M2 (ref 60–?)
ERYTHROCYTE [DISTWIDTH] IN BLOOD BY AUTOMATED COUNT: 17.1 % (ref 11–15)
GLUCOSE BLD-MCNC: 161 MG/DL (ref 70–99)
GLUCOSE BLDC GLUCOMTR-MCNC: 138 MG/DL (ref 70–99)
GLUCOSE BLDC GLUCOMTR-MCNC: 146 MG/DL (ref 70–99)
GLUCOSE BLDC GLUCOMTR-MCNC: 159 MG/DL (ref 70–99)
HCT VFR BLD AUTO: 23.6 %
HCT VFR BLD AUTO: 24.7 %
HGB BLD-MCNC: 7.4 G/DL
HGB BLD-MCNC: 8 G/DL
MAGNESIUM SERPL-MCNC: 2.2 MG/DL (ref 1.6–2.6)
MCH RBC QN AUTO: 27.1 PG (ref 26–34)
MCHC RBC AUTO-ENTMCNC: 31.4 G/DL (ref 31–37)
MCV RBC AUTO: 86.4 FL
MRSA DNA SPEC QL NAA+PROBE: NEGATIVE
OSMOLALITY SERPL CALC.SUM OF ELEC: 314 MOSM/KG (ref 275–295)
PHOSPHATE SERPL-MCNC: 3 MG/DL (ref 2.4–5.1)
PLATELET # BLD AUTO: 254 10(3)UL (ref 150–450)
POTASSIUM SERPL-SCNC: 4.2 MMOL/L (ref 3.5–5.1)
POTASSIUM SERPL-SCNC: 4.2 MMOL/L (ref 3.5–5.1)
RBC # BLD AUTO: 2.73 X10(6)UL
RH BLOOD TYPE: POSITIVE
SODIUM SERPL-SCNC: 148 MMOL/L (ref 136–145)
WBC # BLD AUTO: 8.4 X10(3) UL (ref 4–11)

## 2024-12-14 PROCEDURE — 74018 RADEX ABDOMEN 1 VIEW: CPT | Performed by: INTERNAL MEDICINE

## 2024-12-14 PROCEDURE — 99233 SBSQ HOSP IP/OBS HIGH 50: CPT | Performed by: INTERNAL MEDICINE

## 2024-12-14 PROCEDURE — 30233N1 TRANSFUSION OF NONAUTOLOGOUS RED BLOOD CELLS INTO PERIPHERAL VEIN, PERCUTANEOUS APPROACH: ICD-10-PCS | Performed by: HOSPITALIST

## 2024-12-14 PROCEDURE — 99233 SBSQ HOSP IP/OBS HIGH 50: CPT | Performed by: FAMILY MEDICINE

## 2024-12-14 PROCEDURE — 99233 SBSQ HOSP IP/OBS HIGH 50: CPT | Performed by: OTHER

## 2024-12-14 PROCEDURE — 99232 SBSQ HOSP IP/OBS MODERATE 35: CPT | Performed by: INTERNAL MEDICINE

## 2024-12-14 PROCEDURE — 99291 CRITICAL CARE FIRST HOUR: CPT | Performed by: INTERNAL MEDICINE

## 2024-12-14 RX ORDER — SODIUM CHLORIDE 9 MG/ML
INJECTION, SOLUTION INTRAVENOUS ONCE
Status: COMPLETED | OUTPATIENT
Start: 2024-12-14 | End: 2024-12-14

## 2024-12-14 RX ORDER — FUROSEMIDE 10 MG/ML
40 INJECTION INTRAMUSCULAR; INTRAVENOUS DAILY
Status: DISCONTINUED | OUTPATIENT
Start: 2024-12-14 | End: 2024-12-16

## 2024-12-14 RX ORDER — HALOPERIDOL 5 MG/ML
2 INJECTION INTRAMUSCULAR 3 TIMES DAILY
Status: COMPLETED | OUTPATIENT
Start: 2024-12-14 | End: 2024-12-15

## 2024-12-14 NOTE — PLAN OF CARE
Pt appears to be more agitated throughout the night. Utilized PRN medications overnight. Bed needs to be re-zeroed.     Problem: Patient Centered Care  Goal: Patient preferences are identified and integrated in the patient's plan of care  Description: Interventions:  - What would you like us to know as we care for you? I work at [employer] and I am still very good friends with people from grade school! My brother, Osbaldo Prince, has been making decisions for me.  - Provide timely, complete, and accurate information to patient/family  - Incorporate patient and family knowledge, values, beliefs, and cultural backgrounds into the planning and delivery of care  - Encourage patient/family to participate in care and decision-making at the level they choose  - Honor patient and family perspectives and choices  Outcome: Progressing     Problem: Diabetes/Glucose Control  Goal: Glucose maintained within prescribed range  Description: INTERVENTIONS:  - Monitor Blood Glucose as ordered  - Assess for signs and symptoms of hyperglycemia and hypoglycemia  - Administer ordered medications to maintain glucose within target range  - Assess barriers to adequate nutritional intake and initiate nutrition consult as needed  - Instruct patient on self management of diabetes  Outcome: Progressing     Problem: Patient/Family Goals  Goal: Patient/Family Long Term Goal  Description: Patient's Long Term Goal: To discharge from the hospital safely    Interventions:  - surgical interventions, rounding, medications  - See additional Care Plan goals for specific interventions  Outcome: Progressing  Goal: Patient/Family Short Term Goal  Description: Patient's Short Term Goal: to breathe better    Interventions:   - manage the ventilator for pt until she is able to breath on her own.  - See additional Care Plan goals for specific interventions  Outcome: Progressing     Problem: CARDIOVASCULAR - ADULT  Goal: Maintains optimal cardiac output and  hemodynamic stability  Description: INTERVENTIONS:  - Monitor vital signs, rhythm, and trends  - Monitor for bleeding, hypotension and signs of decreased cardiac output  - Evaluate effectiveness of vasoactive medications to optimize hemodynamic stability  - Monitor arterial and/or venous puncture sites for bleeding and/or hematoma  - Assess quality of pulses, skin color and temperature  - Assess for signs of decreased coronary artery perfusion - ex. Angina  - Evaluate fluid balance, assess for edema, trend weights  Outcome: Progressing  Goal: Absence of cardiac arrhythmias or at baseline  Description: INTERVENTIONS:  - Continuous cardiac monitoring, monitor vital signs, obtain 12 lead EKG if indicated  - Evaluate effectiveness of antiarrhythmic and heart rate control medications as ordered  - Initiate emergency measures for life threatening arrhythmias  - Monitor electrolytes and administer replacement therapy as ordered  Outcome: Progressing     Problem: RESPIRATORY - ADULT  Goal: Achieves optimal ventilation and oxygenation  Description: INTERVENTIONS:  - Assess for changes in respiratory status  - Assess for changes in mentation and behavior  - Position to facilitate oxygenation and minimize respiratory effort  - Oxygen supplementation based on oxygen saturation or ABGs  - Provide Smoking Cessation handout, if applicable  - Encourage broncho-pulmonary hygiene including cough, deep breathe, Incentive Spirometry  - Assess the need for suctioning and perform as needed  - Assess and instruct to report SOB or any respiratory difficulty  - Respiratory Therapy support as indicated  - Manage/alleviate anxiety  - Monitor for signs/symptoms of CO2 retention  Outcome: Progressing     Problem: GASTROINTESTINAL - ADULT  Goal: Minimal or absence of nausea and vomiting  Description: INTERVENTIONS:  - Maintain adequate hydration with IV or PO as ordered and tolerated  - Nasogastric tube to low intermittent suction as ordered  -  Evaluate effectiveness of ordered antiemetic medications  - Provide nonpharmacologic comfort measures as appropriate  - Advance diet as tolerated, if ordered  - Obtain nutritional consult as needed  - Evaluate fluid balance  Outcome: Progressing  Goal: Maintains or returns to baseline bowel function  Description: INTERVENTIONS:  - Assess bowel function  - Maintain adequate hydration with IV or PO as ordered and tolerated  - Evaluate effectiveness of GI medications  - Encourage mobilization and activity  - Obtain nutritional consult as needed  - Establish a toileting routine/schedule  - Consider collaborating with pharmacy to review patient's medication profile  Outcome: Progressing     Problem: METABOLIC/FLUID AND ELECTROLYTES - ADULT  Goal: Glucose maintained within prescribed range  Description: INTERVENTIONS:  - Monitor Blood Glucose as ordered  - Assess for signs and symptoms of hyperglycemia and hypoglycemia  - Administer ordered medications to maintain glucose within target range  - Assess barriers to adequate nutritional intake and initiate nutrition consult as needed  - Instruct patient on self management of diabetes  Outcome: Progressing  Goal: Electrolytes maintained within normal limits  Description: INTERVENTIONS:  - Monitor labs and rhythm and assess patient for signs and symptoms of electrolyte imbalances  - Administer electrolyte replacement as ordered  - Monitor response to electrolyte replacements, including rhythm and repeat lab results as appropriate  - Fluid restriction as ordered  - Instruct patient on fluid and nutrition restrictions as appropriate  Outcome: Progressing  Goal: Hemodynamic stability and optimal renal function maintained  Description: INTERVENTIONS:  - Monitor labs and assess for signs and symptoms of volume excess or deficit  - Monitor intake, output and patient weight  - Monitor urine specific gravity, serum osmolarity and serum sodium as indicated or ordered  - Monitor  response to interventions for patient's volume status, including labs, urine output, blood pressure (other measures as available)  - Encourage oral intake as appropriate  - Instruct patient on fluid and nutrition restrictions as appropriate  Outcome: Progressing     Problem: SKIN/TISSUE INTEGRITY - ADULT  Goal: Skin integrity remains intact  Description: INTERVENTIONS  - Assess and document risk factors for pressure ulcer development  - Assess and document skin integrity  - Monitor for areas of redness and/or skin breakdown  - Initiate interventions, skin care algorithm/standards of care as needed  Outcome: Progressing  Goal: Incision(s), wounds(s) or drain site(s) healing without S/S of infection  Description: INTERVENTIONS:  - Assess and document risk factors for pressure ulcer development  - Assess and document skin integrity  - Assess and document dressing/incision, wound bed, drain sites and surrounding tissue  - Implement wound care per orders  - Initiate isolation precautions as appropriate  - Initiate Pressure Ulcer prevention bundle as indicated  Outcome: Progressing  Goal: Oral mucous membranes remain intact  Description: INTERVENTIONS  - Assess oral mucosa and hygiene practices  - Implement preventative oral hygiene regimen  - Implement oral medicated treatments as ordered  Outcome: Progressing     Problem: HEMATOLOGIC - ADULT  Goal: Maintains hematologic stability  Description: INTERVENTIONS  - Assess for signs and symptoms of bleeding or hemorrhage  - Monitor labs and vital signs for trends  - Administer supportive blood products/factors, fluids and medications as ordered and appropriate  - Administer supportive blood products/factors as ordered and appropriate  Outcome: Progressing     Problem: MUSCULOSKELETAL - ADULT  Goal: Return mobility to safest level of function  Description: INTERVENTIONS:  - Assess patient stability and activity tolerance for standing, transferring and ambulating w/ or w/o  assistive devices  - Assist with transfers and ambulation using safe patient handling equipment as needed  - Ensure adequate protection for wounds/incisions during mobilization  - Obtain PT/OT consults as needed  - Advance activity as appropriate  - Communicate ordered activity level and limitations with patient/family  Outcome: Progressing  Goal: Maintain proper alignment of affected body part  Description: INTERVENTIONS:  - Support and protect limb and body alignment per provider's orders  - Instruct and reinforce with patient and family use of appropriate assistive device and precautions (e.g. spinal or hip dislocation precautions)  Outcome: Progressing     Problem: NEUROLOGICAL - ADULT  Goal: Achieves stable or improved neurological status  Description: INTERVENTIONS  - Assess for and report changes in neurological status  - Initiate measures to prevent increased intracranial pressure  - Maintain blood pressure and fluid volume within ordered parameters to optimize cerebral perfusion and minimize risk of hemorrhage  - Monitor temperature, glucose, and sodium. Initiate appropriate interventions as ordered  Outcome: Progressing     Problem: PAIN - ADULT  Goal: Verbalizes/displays adequate comfort level or patient's stated pain goal  Description: INTERVENTIONS:  - Encourage pt to monitor pain and request assistance  - Assess pain using appropriate pain scale  - Administer analgesics based on type and severity of pain and evaluate response  - Implement non-pharmacological measures as appropriate and evaluate response  - Consider cultural and social influences on pain and pain management  - Manage/alleviate anxiety  - Utilize distraction and/or relaxation techniques  - Monitor for opioid side effects  - Notify MD/LIP if interventions unsuccessful or patient reports new pain  - Anticipate increased pain with activity and pre-medicate as appropriate  Outcome: Progressing     Problem: Delirium  Goal: Minimize duration  of delirium  Description: Interventions:  - Encourage use of hearing aids, eye glasses  - Promote highest level of mobility daily  - Provide frequent reorientation  - Promote wakefulness i.e. lights on, blinds open  - Promote sleep, encourage patient's normal rest cycle i.e. lights off, TV off, minimize noise and interruptions  - Encourage family to assist in orientation and promotion of home routines  Outcome: Progressing

## 2024-12-14 NOTE — RESPIRATORY THERAPY NOTE
Patient received trached, portex 8, and on ventilator PRVC 18/550/+5/30%. Bilateral breath sounds auscultated. Suction provided when indicated. Trach care provided. No acute events during the night. RT will continue to monitor.       12/14/24 0402   Readings   Total RR 18   Minute Ventilation (L/min) 9.9 L/min   Expiratory Tidal Volume 530 mL   PIP Observed (cm H2O) 37 cm H2O   MAP (cm H2O) 13

## 2024-12-14 NOTE — PROGRESS NOTES
[facility]  part of [facility]    Progress Note    Alisha Wilde Patient Status:  Inpatient    10/25/1965 MRN [id number]   Location [facility] 2W/SW Attending Radha Tidwell MD   Hosp Day # 22 PCP Bridger Avendano MD     Subjective:  In bed on exam.  Currently on full vent support w/Trach in place. She easily awakens to verbal stimuli.  She is calm, still on low-dose Precedex. She nods head to simple questions.  She currently denies pain.  Able to follow some simple commands such as squeezes hands and wiggles toes. Pt extremely weak.   No visitors at bedside.     Objective:  /58   Pulse 74   Temp 97.2 °F (36.2 °C) (Temporal)   Resp 19   Ht 5' 5\" (1.651 m)   Wt (!) 309 lb 1.4 oz (140.2 kg)   SpO2 100%   BMI 51.43 kg/m²     Temp (24hrs), Av.6 °F (37 °C), Min:97.2 °F (36.2 °C), Max:99.7 °F (37.6 °C)      Intake/Output:    Intake/Output Summary (Last 24 hours) at 2024 0909  Last data filed at 2024 0600  Gross per 24 hour   Intake 3888.96 ml   Output 2650 ml   Net 1238.96 ml       Wt Readings from Last 3 Encounters:   24 (!) 309 lb 1.4 oz (140.2 kg)   10/24/24 254 lb (115.2 kg)   24 249 lb (112.9 kg)       Allergies:  Allergies[1]    Labs:  Lab Results   Component Value Date    WBC 8.4 2024    HGB 7.4 2024    HCT 23.6 2024    .0 2024    CREATSERUM 1.28 2024    BUN 26 2024     2024    K 4.2 2024    K 4.2 2024     2024    CO2 22.0 2024     2024    CA 8.4 2024    MG 2.2 2024    PHOS 3.0 2024       Physical Exam:  Blood pressure 126/58, pulse 74, temperature 97.2 °F (36.2 °C), temperature source Temporal, resp. rate 19, height 5' 5\" (1.651 m), weight (!) 309 lb 1.4 oz (140.2 kg), SpO2 100%, not currently breastfeeding.  General: NAD  Neck: Unable to assess JVD due to body habitus, + trach  Lungs: Coarse bilaterally anteriorly & laterally    Heart: RRR, S1, S2  Abdomen: Distended/Obese, slightly tender (nods head), BS+x 4/PEG to LIS. TF off. Daily enemas.   Extremities: Warm, dry, 2+ generalized edema  Pulses: 2+ bilat DP  Skin: sternotomy incision CATHIE=CDI-healing well  Neurological:  awakens to verbal stimuli/nods head to simple questions/moves all ext on command.     Assessment/Plan:  S/P EMERGENT AORTIC DISSECTION REPAIR POD # 22  Respiratory Failure w/multiple failed SBT prior to trach placement -currently on full vent support-continue daily weaning trials w/mgt per Pulm. Re-intubated on 11/22 per Pulm after emesis episode/ETT removal due to emesis content noted in airway.    S/P Trach placement per ENT on 12/4 w/Trach changed 12/10   Completed ABX course for suspected aspiration pneumonia post op.  ABX re-started on 12/9 after emesis episode on 12/9: concern for aspiration   +BM (s) post op however pt w/episode of emesis on 12/9 w/stool content noted/+high PEG residuals. TF continue on hold due to persistently high residuals/less today. PEG to LIS.  Mgt per GI. Daily enemas per GI. Per bedside   Hx: HTN: on multiple antihypertensives including IV Cardene & IV Labetolol-weaning as able: SBP goal= <130/MAP >70. Mgt per Cards. Can wean once able to take meds through PEG.   New A-line placed 12/2  New PICC placed 12/2  Pain meds as needed-avoid/limit narcotics w/recent constipation/emesis episodes/dilated bowel loops on ABD x-ray 12/12.  KUB pending this a.m.  Scds/Heparin SubQ prophylaxis DVT prevention. HIPA 11/25=negative. Plts continue >100,000  Continue ICU status  Hx: CKD. Expected post op volume overload w/mgt per Nephrology (consulted on 11/23) for NAIMA: CR continues to improve. Limit/avoid nephrotoxic meds: Lasix per Nephrology. Aguilar remains for close I/O monitoring/immobility-new aguilar placed 12/9 then again 12/10 due to obstruction. Kidney US w/doppler on 12/8.   Expected post op anemia: w/o clinical significance, transfuse 1 PRBC today.   Hgb 7.4.  May be slightly diluted due to volume overload.   Hx: Morbid obesity w/BMI >40-plan for RD to see when appropriate  Nutrition per TF via PEG-continues on hold due to high PEG residuals. PEG placed 12/5 per GI.  PEG to LIS. Mgt per GI.  Hx: ETOH abuse-monitor for withdrawals. [facility] protocol PRN   Psych consulted 12/12 to assist w/med mgt-follow recs  Discharge planning: pt lives alone and has supportive family.   Anticipate LTAC at discharge: referrals sent proactively by  on 11/29 for LTAC.      Plan of care discussed w/RN and CV Surgeon: Dr. Marysol Randle, APRN  12/14/2024  0 912       [1]   Allergies  Allergen Reactions    Atorvastatin MYALGIA    Pravastatin MYALGIA    Rosuvastatin MYALGIA    Seasonal Runny nose

## 2024-12-14 NOTE — PROGRESS NOTES
[facility]  Cardiology Progress Note    Alisha Wilde Patient Status:  Inpatient    10/25/1965 MRN [id number]   Location [facility] 2W/SW Attending Radha Tidwell MD   Hosp Day # 22 PCP Bridger Avendano MD     Subjective     Intubated, sedated.    Objective:   Patient Vitals for the past 24 hrs:   BP Temp Temp src Pulse Resp SpO2   24 0400 -- 97.2 °F (36.2 °C) Temporal -- -- --   24 0300 (!) 115/96 -- -- 75 18 100 %   24 0200 138/53 -- -- 77 18 100 %   24 0100 131/70 -- -- 79 19 100 %   24 0038 136/54 -- -- 77 18 100 %   24 0000 140/57 98.8 °F (37.1 °C) Temporal 80 22 100 %   24 2300 135/53 -- -- 81 21 100 %   24 2200 142/58 -- -- 84 22 100 %   24 2100 146/77 -- -- 84 22 100 %   24 2000 147/53 99.7 °F (37.6 °C) Temporal 83 19 100 %   24 1900 143/54 -- -- 81 21 99 %   24 1800 143/57 -- -- 85 18 100 %   24 1700 140/58 -- -- 83 18 100 %   24 1600 (!) 145/128 98.7 °F (37.1 °C) Temporal 83 22 100 %   24 1500 139/54 -- -- 80 18 100 %   24 1400 136/58 -- -- 81 25 100 %   24 1300 137/53 -- -- 83 21 100 %   24 1200 128/57 98.4 °F (36.9 °C) Temporal 77 20 98 %   24 1100 141/57 -- -- 77 19 97 %   24 1000 140/57 -- -- 78 21 97 %   24 0900 (!) 117/96 -- -- 78 19 97 %   24 0800 134/56 98.2 °F (36.8 °C) Temporal 77 19 98 %     Intake/Output:   Last 3 shifts:   Intake/Output                   24 0700 - 24 0659 24 0700 - 24 0659 24 0700 - 12/15/24 0659       Intake    I.V.  2571.9  1810.8  --    I.V. 537 451 --    Volume (mL) Propofol 49 -- --    Volume (mL) Dexmedetomidine 480.9 212.3 --    Volume (mL) Labetalol 567.5 656 --    Volume (mL) Nicardipine 937.5 491.5 --    NG/GT  0  --  --    Intake (mL) (Gastrostomy/Enterostomy Percutaneous endoscopic gastrostomy (PEG) 1 20 Fr. LUQ) 0 -- --    TPN  540  1218.8  --    Volume Infused  (mL) 540  1218.8 --    Total Intake 3111.9 3029.5 --       Output    Urine  2650  2175  --    Output (mL) (Urethral Catheter) 2650 2175 --    Emesis/NG output  450  100  --    Output (mL) (Gastrostomy/Enterostomy Percutaneous endoscopic gastrostomy (PEG) 1 20 Fr. LUQ) 450 100 --    Total Output 3100 2275 --       Net I/O     11.9 754.5 --             Vent Settings: Vent Mode: PRVC/AC  FiO2 (%):  [30 %] 30 %  S RR:  [18] 18  S VT:  [550 mL] 550 mL  PEEP/CPAP (cm H2O):  [5 cm H20] 5 cm H20  MAP (cm H2O):  [13-15] 13      Scheduled Meds:    LORazepam  1 mg Intravenous TID    cloNIDine  1 patch Transdermal Weekly    metoprolol  5 mg Intravenous Q6H    piperacillin-tazobactam  3.375 g Intravenous Q8H    vancomycin  12.5 mg/kg Intravenous Q24H    carvedilol  37.5 mg Oral BID with meals    aspirin  81 mg Peg Tube Daily    isosorbide dinitrate  40 mg Per NG Tube TID (Nitrates)    hydrALAZINE  150 mg Oral Q8H GABRIEL    amLODIPine  10 mg Oral Daily    heparin  5,000 Units Subcutaneous Q8H GABRIEL    chlorhexidine gluconate  15 mL Mouth/Throat BID    famotidine  20 mg Intravenous Daily       Continuous Infusions:    adult 3 in 1 TPN 83.3 mL/hr at 12/13/24 2143    labetalol (Trandate) 400 mg in sodium chloride 0.9% 200 mL infusion 2 mg/min (12/14/24 8045)    dextrose 10%      dexmedetomidine 0.8 mcg/kg/hr (12/14/24 0511)    niCARdipine 15 mg/hr (12/14/24 0221)       Results:     Lab Results   Component Value Date    WBC 8.4 12/14/2024    HGB 7.4 (L) 12/14/2024    HCT 23.6 (L) 12/14/2024    .0 12/14/2024    CREATSERUM 1.28 (H) 12/14/2024    BUN 26 (H) 12/14/2024     (H) 12/14/2024    K 4.2 12/14/2024    K 4.2 12/14/2024     (H) 12/14/2024    CO2 22.0 12/14/2024     (H) 12/14/2024    CA 8.4 (L) 12/14/2024    ALB 3.3 12/13/2024    ALKPHO 73 12/13/2024    BILT 0.5 12/13/2024    TP 5.8 12/13/2024    AST 15 12/13/2024    ALT 16 12/13/2024    PTT 30.7 12/04/2024    INR 1.17 12/04/2024    TSH 2.085 12/07/2024    NATHALIE 99  11/25/2024    LIP 28 11/25/2024    DDIMER 0.42 12/08/2019    ESRML 15 06/05/2021    MG 2.2 12/14/2024    PHOS 3.0 12/14/2024    TROP <0.045 12/08/2019     (H) 09/23/2021    B12 1,156 (H) 07/23/2018       Recent Labs   Lab 12/12/24  0443 12/12/24  1825 12/13/24  0612 12/14/24  0631   *  --  151* 161*   BUN 33*  --  27* 26*   CREATSERUM 1.44*  --  1.28* 1.28*   CA 8.8  --  8.4* 8.4*   *  --  149* 148*   K 3.2*  3.2* 3.2* 3.4*  3.4* 4.2  4.2   *  --  118* 117*   CO2 19.0*  --  21.0 22.0     Recent Labs   Lab 12/08/24  0637 12/09/24  0609 12/10/24  0607 12/12/24  0443 12/13/24  0904 12/14/24  0631   RBC 3.84 3.18*   < > 2.95* 2.67* 2.73*   HGB 10.9* 9.0*   < > 8.3* 7.3* 7.4*   HCT 33.5* 27.7*   < > 25.3* 23.0* 23.6*   MCV 87.2 87.1   < > 85.8 86.1 86.4   MCH 28.4 28.3   < > 28.1 27.3 27.1   MCHC 32.5 32.5   < > 32.8 31.7 31.4   RDW 17.9* 17.3*   < > 16.8* 17.0* 17.1*   NEPRELIM 6.94 7.06  --  3.64  --   --    WBC 9.9 10.2   < > 6.5 6.5 8.4   .0 165.0   < > 275.0 240.0 254.0    < > = values in this interval not displayed.       No results for input(s): \"BNPML\" in the last 168 hours.    No results for input(s): \"TROP\", \"CK\" in the last 168 hours.    XR ABDOMEN (1 VIEW) (CPT=74018)    Result Date: 12/12/2024  CONCLUSION:  1. Nonspecific small bowel dilatation left mid abdomen and central abdomen measures up to 4.5 cm. 2. Mild stool scattered throughout the colon suggests constipation fecal retention.     Dictated by (CST): Woodrow Fischer MD on 12/12/2024 at 1:58 PM     Finalized by (CST): Woodrow Fischer MD on 12/12/2024 at 2:01 PM          XR CHEST AP PORTABLE  (CPT=71045)    Result Date: 12/12/2024  CONCLUSION:  1. Cardiomegaly.  Tortuous aorta. 2. Bilateral mixed multifocal airspace consolidation with slight improved aeration left lung base. 3. Tracheostomy tube overlies the tracheal air column.  Right PICC line with the tip in the SVC.     Dictated by (CST): Woodrow Fischer  MD MINOO on 2024 at 11:28 AM     Finalized by (CST): Woodrow Fischer MD on 2024 at 11:31 AM         EKG 12 Lead    Result Date: 2024  Normal sinus rhythm Nonspecific T wave abnormality Abnormal ECG When compared with ECG of 2024 04:39, Nonspecific T wave abnormality now evident in Anterior-lateral leads Confirmed by landon shankar ([id number]) on 2024 3:52:29 PM   CARD ECHO 2D DOPPLER CONTRAST (CPT=93306)    Result Date: 2024  Transthoracic Echocardiogram Name:Alisha Wilde Date: 2024 :  10/25/1965 Ht:  (65in)  BP: 120 / 65 MRN:  [id number]    Age:  59years    Wt:  (258lb) HR: Loc:             Gndr: F          BSA: 2.2m^2 Sonographer: Ping GAMEZ Ordering:    Edward Montgomery ---------------------------------------------------------------------------- History/Indications:   S/P surgery aortic dissection type A.  Risk factors: Hypertension. Dyslipidemia. ---------------------------------------------------------------------------- Procedure information:  A transthoracic complete 2D study was performed. Additional evaluation included M-mode, complete spectral Doppler, and color Doppler.  Patient status:  Inpatient.  Location:  [facility]    Comparison was made to the study of 2022.    This was a routine study. Transthoracic echocardiography for diagnosis and ventricular function evaluation. Image quality was suboptimal. The study was technically limited due to poor acoustic window availability, restricted patient mobility, surgical dressings, body habitus, and patient on ventilator. Satisfactory imaging could not be obtained from the subcostal or suprasternal notch acoustic window(s). Intravenous contrast (Definity) was administered to evaluate left ventricular function. ECG rhythm:   Normal sinus ---------------------------------------------------------------------------- Conclusions: 1. Left ventricle: The cavity size was normal. Wall thickness was mildly    increased. Systolic  function was hyperdynamic. The estimated ejection    fraction was 75-80%, by visual assessment. No diagnostic evidence for    regional wall motion abnormalities. Left ventricular diastolic function    parameters were normal. 2. Right ventricle: Catheter noted in the right ventricle. 3. Ventricular septum: Thickness was moderately increased. 4. Right atrium: Catheter noted in right atrium. 5. Pulmonary arteries: Systolic pressure was within the normal range. * ---------------------------------------------------------------------------- * Findings: Left ventricle:  The cavity size was normal. Wall thickness was mildly increased. Systolic function was hyperdynamic. The estimated ejection fraction was 75-80%, by visual assessment. No diagnostic evidence for diffuse regional wall motion abnormalities. No diagnostic evidence for regional wall motion abnormalities. Left ventricular diastolic function parameters were normal. Ventricular septum:   Thickness was moderately increased. Left atrium:  The atrium was normal in size. Right ventricle:  The cavity size was normal. Catheter noted in the right ventricle. Systolic function was normal. Right atrium:  The atrium was normal in size. Catheter noted in right atrium. Mitral valve:  The valve was structurally normal. The leaflets were normal thickness. Leaflet separation was normal.  Doppler:  Transvalvular velocity was within the normal range. There was no evidence for stenosis. There was trace regurgitation. Aortic valve:  The valve was structurally normal. The valve was trileaflet. The leaflets were normal thickness. Cusp separation was normal.  Doppler: Transvalvular velocity was within the normal range. There was no evidence for stenosis. There was no significant regurgitation. Tricuspid valve:  The valve is structurally normal. Leaflet separation was normal.  Doppler:  Transvalvular velocity was within the normal range. There was no evidence for stenosis. There was  trivial regurgitation. Pulmonic valve:   The valve is structurally normal. Cusp separation was normal.  Doppler:  Transvalvular velocity was within the normal range. There was no evidence for stenosis. There was mild regurgitation. Pericardium:   There was no pericardial effusion. Aorta: Aortic root: The aortic root was normal-sized. Ascending aorta: The ascending aorta was normal. Pulmonary arteries: The main pulmonary artery was normal-sized. Systolic pressure was within the normal range. Systemic veins:  Not visualized. Inferior vena cava: The IVC was normal-sized. ---------------------------------------------------------------------------- Measurements  Left ventricle                    Value        Ref  IVS thickness, ED, PLAX       (H) 1.3   cm     0.6 - 0.9  LV ID, ED, PLAX                   3.9   cm     3.8 - 5.2  LV ID, ES, PLAX               (L) 2.0   cm     2.2 - 3.5  LV PW thickness, ED, PLAX     (H) 1.2   cm     0.6 - 0.9  IVS/LV PW ratio, ED, PLAX         1.08         ---------  LV PW/LV ID ratio, ED, PLAX       0.31         ---------  LV ejection fraction          (H) 81    %      54 - 74  Stroke volume/bsa, 2D             20    ml/m^2 ---------  LV e', lateral                    11.4  cm/sec >=10.0  LV E/e', lateral                  5            <=13  LV e', medial                     8.4   cm/sec >=7.0  LV E/e', medial                   6            ---------  LV e', average                    9.9   cm/sec ---------  LV E/e', average                  5            <=14  LVOT                              Value        Ref  LVOT ID                           2     cm     ---------  LVOT peak velocity, S             0.89  m/sec  ---------  LVOT VTI, S                       13.6  cm     ---------  LVOT peak gradient, S             3     mm Hg  ---------  LVOT mean gradient, S             1     mm Hg  ---------  Stroke volume (SV), LVOT DP       43    ml     ---------  Stroke index (SV/bsa), LVOT       19     ml/m^2 ---------  DP  Aortic valve                      Value        Ref  Aortic valve VTI, S               18.2  cm     ---------  Velocity ratio, peak, LVOT/AV     0.82         ---------  Aortic root                       Value        Ref  Aortic root ID                    3.3   cm     2.8 - 4.3  Aortic root ID, STJ, ED           3.0   cm     2.0 - 3.2  Aortic root ID, ED, MM            3.5   cm     ---------  Ascending aorta                   Value        Ref  Ascending aorta ID                2.7   cm     1.9 - 3.5  Left atrium                       Value        Ref  LA ID, A-P, ES                    3.7   cm     2.7 - 3.8  LA volume, S                  (H) 66    ml     22 - 52  LA volume/bsa, S                  30    ml/m^2 16 - 34  LA volume, ES, 1-p A4C            49    ml     22 - 52  LA volume, ES, 1-p A2C        (H) 80    ml     22 - 52  LA volume, ES, A/L                75    ml     ---------  LA volume/bsa, ES, A/L            34    ml/m^2 16 - 34  LA/aortic root ratio              1.12         ---------  Mitral valve                      Value        Ref  Mitral E-wave peak velocity       0.53  m/sec  ---------  Mitral A-wave peak velocity       0.42  m/sec  ---------  Mitral deceleration time          225   ms     ---------  Mitral E/A ratio, peak            1.2          ---------  Mitral regurg peak velocity       311   cm/sec ---------  Mitral peak LV-LA gradient, S     38.69 mm Hg  ---------  Pulmonary artery                  Value        Ref  PA pressure, S, DP                25    mm Hg  ---------  PA pressure, ED, DP               11    mm Hg  ---------  Tricuspid valve                   Value        Ref  Tricuspid regurg peak             2.07  m/sec  <=2.8  velocity  Tricuspid peak RV-RA gradient     17    mm Hg  ---------  Right atrium                      Value        Ref  RA ID, S-I, ES, A4C               5.2   cm     3.4 - 5.3  RA ID/bsa, S-I, ES, A4C           2.3   cm/m^2 1.9 - 3.1  RA  area, ES, A4C                  18    cm^2   10 - 18  RA volume, ES, 1-p A4C            52    ml     ---------  RA volume/bsa, ES, 1-p A4C        24    ml/m^2 9 - 33  Systemic veins                    Value        Ref  Estimated CVP                     8     mm Hg  ---------  Right ventricle                   Value        Ref  TAPSE, MM                     (L) 1.07  cm     >=1.70  RV pressure, S, DP                25    mm Hg  ---------  RV s', lateral                (L) 4.1   cm/sec >=9.5  Pulmonic valve                    Value        Ref  Pulmonic valve peak velocity,     1.5   m/sec  ---------  S  Pulmonic regurg peak velocity     1.76  m/sec  ---------  Pulmonic regurg peak gradient     12    mm Hg  ---------  Pulmonic regurg velocity, ED      0.93  m/sec  --------- Legend: (L)  and  (H)  deion values outside specified reference range. ---------------------------------------------------------------------------- Prepared and electronically signed by Deion White 11/23/2024 15:40        Exam:     General: awake/alert  HEENT: +trach  Neck: No JVD, carotids 2+, no bruits.  Cardiac: Regular rate and rhythm. S1, S2 normal.   Lungs: +rhonchi   Abdomen: Soft, non-tender.BS-present.  Extremities: Without clubbing or cyanosis. + BLE edema.    Neurologic: unable to obtain  Skin: Warm and dry.     Assessment and Plan:   Acute hypoxic respiratory failure  Suspected ileus, associated with fecal matter aspiration and emesis  Type A aortic dissection  NAIMA on CKD-III, improved  Obesity  EtOH abuse  -presented with acute onset CP   -CT with type A aortic dissection above right coronary artery and extending distally into the left common iliac artery and external iliac   -s/p emergent successful repair on 11/22/24  -had aspiration event following self-extubation, re-intubated with multiple failed SBT; now s/p trach/PEG tube                 -on broad spectrum Abx and NPO again due to fecal matter emesis and ileus       Hypertension  -TTE on 11/23 with hyperdynamic LVEF  -continue BP management - substantial component of agitation worsening BP now that she is actively undergoing sedation wean                 -NPO after aspiration event and worsening ileus                 -holding coreg from 37.5 mg, clonidine  0.3 mg 3 times daily, combination hydralazine + isosorbide 150mg-40mg TID, amlodipine 10 mg daily                 -continue nicardipine gtt                 -continue labetalol gtt                 -continue clonidine patch  -monitor rhythm on tele    Frank Oconnor MD  [facility]  12/14/2024

## 2024-12-14 NOTE — PROGRESS NOTES
[facility]     Gastroenterology Progress Note    Alisha Wilde Patient Status:  Inpatient    10/25/1965 MRN [id number]   Location [facility] 2W/SW Attending Missy Paulson MD   Hosp Day # 22 PCP Bridger Avendano MD       Subjective:   D/w RN output from PEG scant bilious  Objective:   Blood pressure 136/56, pulse 77, temperature 98.6 °F (37 °C), temperature source Temporal, resp. rate 24, height 5' 5\" (1.651 m), weight (!) 309 lb 1.4 oz (140.2 kg), SpO2 100%, not currently breastfeeding. Body mass index is 51.43 kg/m².    Gen: sedated, NAD  HEENT: mucus membranes appear moist, trach to vent  CV: RRR  Lung: no conversational dyspnea   Abdomen: soft NT, remains distended with hypoactive BS appreciated, peg luq  Skin: dry, warm, no jaundice  Ext: +edema  Neuro: sedated    Assessment and Plan:   Alisha Wilde is a 59 year old female w/ PMHx of hypertension, GERD, CKD, hyperlipidemia who presented to ER 2024 for chest pain. Underwent emergent repair of aortic type A dissection on 2024. Inability to wean from the ventillator now s/p trach and PEG placement -.  GI reconsulted for high residual TF.     # Small bowel dilation, likely ileus less likely evolving SBO  -s/p PEG   -High TF residuals since onset  -CT abdomen pelvis with dilated fluid-filled loops of small bowel, no transition point  -output from PEG appears to have slowed down today  -Repeat KUB today with ongoing SB dilation   -continue to hold tube feeds  -remains on TPN  -Avoid dysmotility agents  -appreciate surgery input  -consider CT with oral contrast tomorrow    D/w nursing    Toma Barrientos MD      Results:     Lab Results   Component Value Date    WBC 8.4 2024    HGB 7.4 (L) 2024    HCT 23.6 (L) 2024    .0 2024    CREATSERUM 1.28 (H) 2024    BUN 26 (H) 2024     (H) 2024    K 4.2 2024    K 4.2 2024     (H) 2024    CO2 22.0  12/14/2024     (H) 12/14/2024    CA 8.4 (L) 12/14/2024    ALB 3.3 12/13/2024    ALKPHO 73 12/13/2024    BILT 0.5 12/13/2024    TP 5.8 12/13/2024    AST 15 12/13/2024    ALT 16 12/13/2024    PTT 30.7 12/04/2024    INR 1.17 12/04/2024    TSH 2.085 12/07/2024    NATHALIE 99 11/25/2024    LIP 28 11/25/2024    DDIMER 0.42 12/08/2019    ESRML 15 06/05/2021    MG 2.2 12/14/2024    PHOS 3.0 12/14/2024    TROP <0.045 12/08/2019     (H) 09/23/2021    B12 1,156 (H) 07/23/2018       XR ABDOMEN (1 VIEW) (CPT=74018)    Result Date: 12/14/2024  CONCLUSION:  Significantly limited examination.  Ongoing dilation of small bowel loops up to 4.7 cm, previously 4.5 cm.  Recommend continued radiographic/clinical follow-up.    Dictated by (CST): Karen Ureña MD on 12/14/2024 at 11:02 AM     Finalized by (CST): Karen Ureña MD on 12/14/2024 at 11:04 AM         EKG 12 Lead    Result Date: 12/12/2024  Normal sinus rhythm Nonspecific T wave abnormality Abnormal ECG When compared with ECG of 23-NOV-2024 04:39, Nonspecific T wave abnormality now evident in Anterior-lateral leads Confirmed by landon shankar ([id number]) on 12/12/2024 3:52:29 PM

## 2024-12-14 NOTE — PLAN OF CARE
Pt able to follow simple commands such as weak hand  and wiggling toes. Pt remains on labetalol and cardene gtt for BP management. Remains on precedex gtt; weaning off of precedex as tolerated. PRN ativan/haldol given as needed. Remains on TPN. PEG remains to LIS. SBT done in AM per RT.    Problem: Patient Centered Care  Goal: Patient preferences are identified and integrated in the patient's plan of care  Description: Interventions:  - What would you like us to know as we care for you? I work at [employer] and I am still very good friends with people from grade school! My brother, Osbaldo Prince, has been making decisions for me.  - Provide timely, complete, and accurate information to patient/family  - Incorporate patient and family knowledge, values, beliefs, and cultural backgrounds into the planning and delivery of care  - Encourage patient/family to participate in care and decision-making at the level they choose  - Honor patient and family perspectives and choices  Outcome: Progressing     Problem: Diabetes/Glucose Control  Goal: Glucose maintained within prescribed range  Description: INTERVENTIONS:  - Monitor Blood Glucose as ordered  - Assess for signs and symptoms of hyperglycemia and hypoglycemia  - Administer ordered medications to maintain glucose within target range  - Assess barriers to adequate nutritional intake and initiate nutrition consult as needed  - Instruct patient on self management of diabetes  Outcome: Progressing     Problem: Patient/Family Goals  Goal: Patient/Family Long Term Goal  Description: Patient's Long Term Goal: To discharge from the hospital safely    Interventions:  - surgical interventions, rounding, medications  - See additional Care Plan goals for specific interventions  Outcome: Progressing  Goal: Patient/Family Short Term Goal  Description: Patient's Short Term Goal: to breathe better    Interventions:   - manage the ventilator for pt until she is able to  breath on her own.  - See additional Care Plan goals for specific interventions  Outcome: Progressing     Problem: CARDIOVASCULAR - ADULT  Goal: Maintains optimal cardiac output and hemodynamic stability  Description: INTERVENTIONS:  - Monitor vital signs, rhythm, and trends  - Monitor for bleeding, hypotension and signs of decreased cardiac output  - Evaluate effectiveness of vasoactive medications to optimize hemodynamic stability  - Monitor arterial and/or venous puncture sites for bleeding and/or hematoma  - Assess quality of pulses, skin color and temperature  - Assess for signs of decreased coronary artery perfusion - ex. Angina  - Evaluate fluid balance, assess for edema, trend weights  Outcome: Progressing  Goal: Absence of cardiac arrhythmias or at baseline  Description: INTERVENTIONS:  - Continuous cardiac monitoring, monitor vital signs, obtain 12 lead EKG if indicated  - Evaluate effectiveness of antiarrhythmic and heart rate control medications as ordered  - Initiate emergency measures for life threatening arrhythmias  - Monitor electrolytes and administer replacement therapy as ordered  Outcome: Progressing     Problem: RESPIRATORY - ADULT  Goal: Achieves optimal ventilation and oxygenation  Description: INTERVENTIONS:  - Assess for changes in respiratory status  - Assess for changes in mentation and behavior  - Position to facilitate oxygenation and minimize respiratory effort  - Oxygen supplementation based on oxygen saturation or ABGs  - Provide Smoking Cessation handout, if applicable  - Encourage broncho-pulmonary hygiene including cough, deep breathe, Incentive Spirometry  - Assess the need for suctioning and perform as needed  - Assess and instruct to report SOB or any respiratory difficulty  - Respiratory Therapy support as indicated  - Manage/alleviate anxiety  - Monitor for signs/symptoms of CO2 retention  Outcome: Progressing     Problem: GASTROINTESTINAL - ADULT  Goal: Minimal or absence of  nausea and vomiting  Description: INTERVENTIONS:  - Maintain adequate hydration with IV or PO as ordered and tolerated  - Nasogastric tube to low intermittent suction as ordered  - Evaluate effectiveness of ordered antiemetic medications  - Provide nonpharmacologic comfort measures as appropriate  - Advance diet as tolerated, if ordered  - Obtain nutritional consult as needed  - Evaluate fluid balance  Outcome: Progressing  Goal: Maintains or returns to baseline bowel function  Description: INTERVENTIONS:  - Assess bowel function  - Maintain adequate hydration with IV or PO as ordered and tolerated  - Evaluate effectiveness of GI medications  - Encourage mobilization and activity  - Obtain nutritional consult as needed  - Establish a toileting routine/schedule  - Consider collaborating with pharmacy to review patient's medication profile  Outcome: Progressing     Problem: METABOLIC/FLUID AND ELECTROLYTES - ADULT  Goal: Glucose maintained within prescribed range  Description: INTERVENTIONS:  - Monitor Blood Glucose as ordered  - Assess for signs and symptoms of hyperglycemia and hypoglycemia  - Administer ordered medications to maintain glucose within target range  - Assess barriers to adequate nutritional intake and initiate nutrition consult as needed  - Instruct patient on self management of diabetes  Outcome: Progressing  Goal: Electrolytes maintained within normal limits  Description: INTERVENTIONS:  - Monitor labs and rhythm and assess patient for signs and symptoms of electrolyte imbalances  - Administer electrolyte replacement as ordered  - Monitor response to electrolyte replacements, including rhythm and repeat lab results as appropriate  - Fluid restriction as ordered  - Instruct patient on fluid and nutrition restrictions as appropriate  Outcome: Progressing  Goal: Hemodynamic stability and optimal renal function maintained  Description: INTERVENTIONS:  - Monitor labs and assess for signs and symptoms of  volume excess or deficit  - Monitor intake, output and patient weight  - Monitor urine specific gravity, serum osmolarity and serum sodium as indicated or ordered  - Monitor response to interventions for patient's volume status, including labs, urine output, blood pressure (other measures as available)  - Encourage oral intake as appropriate  - Instruct patient on fluid and nutrition restrictions as appropriate  Outcome: Progressing     Problem: SKIN/TISSUE INTEGRITY - ADULT  Goal: Skin integrity remains intact  Description: INTERVENTIONS  - Assess and document risk factors for pressure ulcer development  - Assess and document skin integrity  - Monitor for areas of redness and/or skin breakdown  - Initiate interventions, skin care algorithm/standards of care as needed  Outcome: Progressing  Goal: Incision(s), wounds(s) or drain site(s) healing without S/S of infection  Description: INTERVENTIONS:  - Assess and document risk factors for pressure ulcer development  - Assess and document skin integrity  - Assess and document dressing/incision, wound bed, drain sites and surrounding tissue  - Implement wound care per orders  - Initiate isolation precautions as appropriate  - Initiate Pressure Ulcer prevention bundle as indicated  Outcome: Progressing  Goal: Oral mucous membranes remain intact  Description: INTERVENTIONS  - Assess oral mucosa and hygiene practices  - Implement preventative oral hygiene regimen  - Implement oral medicated treatments as ordered  Outcome: Progressing     Problem: HEMATOLOGIC - ADULT  Goal: Maintains hematologic stability  Description: INTERVENTIONS  - Assess for signs and symptoms of bleeding or hemorrhage  - Monitor labs and vital signs for trends  - Administer supportive blood products/factors, fluids and medications as ordered and appropriate  - Administer supportive blood products/factors as ordered and appropriate  Outcome: Progressing     Problem: MUSCULOSKELETAL - ADULT  Goal: Return  mobility to safest level of function  Description: INTERVENTIONS:  - Assess patient stability and activity tolerance for standing, transferring and ambulating w/ or w/o assistive devices  - Assist with transfers and ambulation using safe patient handling equipment as needed  - Ensure adequate protection for wounds/incisions during mobilization  - Obtain PT/OT consults as needed  - Advance activity as appropriate  - Communicate ordered activity level and limitations with patient/family  Outcome: Progressing  Goal: Maintain proper alignment of affected body part  Description: INTERVENTIONS:  - Support and protect limb and body alignment per provider's orders  - Instruct and reinforce with patient and family use of appropriate assistive device and precautions (e.g. spinal or hip dislocation precautions)  Outcome: Progressing     Problem: NEUROLOGICAL - ADULT  Goal: Achieves stable or improved neurological status  Description: INTERVENTIONS  - Assess for and report changes in neurological status  - Initiate measures to prevent increased intracranial pressure  - Maintain blood pressure and fluid volume within ordered parameters to optimize cerebral perfusion and minimize risk of hemorrhage  - Monitor temperature, glucose, and sodium. Initiate appropriate interventions as ordered  Outcome: Progressing     Problem: PAIN - ADULT  Goal: Verbalizes/displays adequate comfort level or patient's stated pain goal  Description: INTERVENTIONS:  - Encourage pt to monitor pain and request assistance  - Assess pain using appropriate pain scale  - Administer analgesics based on type and severity of pain and evaluate response  - Implement non-pharmacological measures as appropriate and evaluate response  - Consider cultural and social influences on pain and pain management  - Manage/alleviate anxiety  - Utilize distraction and/or relaxation techniques  - Monitor for opioid side effects  - Notify MD/LIP if interventions unsuccessful or  patient reports new pain  - Anticipate increased pain with activity and pre-medicate as appropriate  Outcome: Progressing     Problem: Delirium  Goal: Minimize duration of delirium  Description: Interventions:  - Encourage use of hearing aids, eye glasses  - Promote highest level of mobility daily  - Provide frequent reorientation  - Promote wakefulness i.e. lights on, blinds open  - Promote sleep, encourage patient's normal rest cycle i.e. lights off, TV off, minimize noise and interruptions  - Encourage family to assist in orientation and promotion of home routines  Outcome: Progressing

## 2024-12-14 NOTE — PROGRESS NOTES
Progress Note     Alisha Wilde Patient Status:  Inpatient    10/25/1965 MRN [id number]   Location [facility] 2W/SW Attending Radha Tidwell MD   Hosp Day # 22 PCP Bridger Avendano MD     Chief Complaint: patient presented with   Chief Complaint   Patient presents with    Chest Pain Angina       Subjective:   S: : trach, plan for possible NG tube by GI , PEG with stool therefore to LIS, weaning sedation   : pt is off propofol, on Precedex, continue to have ileus   : pt precedex is weaning, following simple commands   : pt had repeat KUB this am. Continue to have ileus, on IV drips for BP maintenance     Review of Systems:   10 point ROS completed and was negative, except for pertinent positive and negatives stated in subjective.    Objective:   Vital signs:  Temp:  [97.2 °F (36.2 °C)-99.7 °F (37.6 °C)] 97.2 °F (36.2 °C)  Pulse:  [73-85] 73  Resp:  [18-25] 22  BP: ()/() 93/78  SpO2:  [97 %-100 %] 100 %  FiO2 (%):  [30 %] 30 %    Wt Readings from Last 6 Encounters:   24 (!) 309 lb 1.4 oz (140.2 kg)   10/24/24 254 lb (115.2 kg)   24 249 lb (112.9 kg)   24 249 lb (112.9 kg)   23 249 lb (112.9 kg)   10/09/23 248 lb (112.5 kg)         Physical Exam:    General: No acute distress. , Tracheostomy in place,     , morbidly obese  Respiratory: Clear to auscultation bilaterally. No wheezes. No rhonchi.  Cardiovascular: S1, S2. Regular rate and rhythm. No murmurs, rubs or gallops.   Abdomen: Soft, nontender, nondistended.  Positive bowel sounds. No rebound or guarding.  Neurologic: No focal neurological deficits.   Musculoskeletal: Moves all extremities.  Extremities: No edema.    Results:   Diagnostic Data:      Labs:    Labs Last 24 Hours:   BMP     CBC    Other     Na 148 Cl 117 BUN 26 Glu 161   Hb 7.4   PTT - Procal -   K 4.2; 4.2 CO2 22.0 Cr 1.28   WBC 8.4 >< .0  INR - CRP -   Renal Lytes Endo    Hct 23.6   Trop - D dim -   eGFR - Ca 8.4 POC Gluc  159     LFT   pBNP - Lactic -   eGFR AA - PO4 3.0 A1c -   AST - APk - Prot -  LDL -     Mg 2.2 TSH -   ALT - T miranda - Alb -        COVID-19 Lab Results    COVID-19  Lab Results   Component Value Date    COVID19 Not Detected 11/21/2024    COVID19 Not Detected 08/10/2023    COVID19 Not Detected 05/13/2022       Pro-Calcitonin  No results for input(s): \"PCT\" in the last 168 hours.    Cardiac  No results for input(s): \"TROP\", \"PBNP\" in the last 168 hours.    Creatinine Kinase  No results for input(s): \"CK\" in the last 168 hours.    Inflammatory Markers  No results for input(s): \"CRP\", \"NATALIA\", \"LDH\", \"DDIMER\" in the last 168 hours.    Imaging: Imaging data reviewed in Epic.    IMAGES:   CT A/P 12/10/24  CONCLUSION:   1. Fluid-filled loops of small bowel, which could reflect underlying infectious/inflammatory enteritis or malabsorption in the appropriate clinical setting. Bowel loops are mildly dilated without clear transition point to suggest mechanical bowel   obstruction, although early or partial bowel obstruction could have a similar appearance.   2. Extensive distal colonic diverticulosis without CT evidence of acute complication.    3. Bladder distension despite Webb catheter placement.   4. Hepatomegaly with hepatic steatosis.   5. Diffuse anasarca.   6. Partially visualized postthoracotomy chest with possible postoperative seroma/hematoma of the anterior chest wall.   7. Bilateral pleural effusions and associated basilar atelectasis, with or without superimposed pneumonia.   8. Additional scattered patchy nodular opacities may reflect infectious process.    9. Low-density appearance of the intracardiac blood pool raises the possibility of underlying anemia. Correlate with hematologic parameters.   10. Lesser incidental findings as above.      CXR 12/9/24  CONCLUSION:   1. Cardiomegaly.  Atherosclerotic aneurysmal thoracic aorta   2. Tracheostomy tube overlies tracheal air column.   3.  Bilateral mixed alveolar and  interstitial multifocal airspace opacification has progressed.        KUB 12/9/24  CONCLUSION:   No huang dilatation of the small bowel to suggest small bowel obstruction.   Large cecal stool burden which may indicate constipation.  Mild stool burden throughout the remainder of the colon.      CXR 12/5/24  No significant change when compared to 12/04/2024.      CXR 12/4/24  CONCLUSION:   1. Mild cardiomegaly and minimally prominent pulmonary vascularity but no huang pulmonary edema.  ET tube and NG tubes have been removed.  Tracheostomy is now noted in the trachea extending to the level the clavicles.  No pneumothorax.   2. Persistent patchy bilateral upper lobe airspace disease right more than left suggesting persistent bilateral upper lobe pneumonia.  Correlate clinically and continued follow-up is advised.      CXR 12/2/24  FINDINGS:   POSITION: The patient is semi-erect and slightly rotated to the right.   DEVICES: There is an endotracheal tube terminating approximately 3.9 cm above the jennyfer.  A large-bore right internal jugular central venous vascular access sheath has tip projecting in the SVC. An enteric tube extends caudally beyond the field of view.   CARDIAC/VASC: The cardiomediastinal silhouette is accentuated by AP technique, but likely stably enlarged. There is mild tortuosity of the thoracic aorta with peripheral atherosclerotic vascular calcification. The pulmonary vascularity is within normal   limits.   MEDIAST/HAMLET: Surgical clips are present.   LUNGS/PLEURA: Elevation right hemidiaphragm is noted. Multifocal patchy airspace consolidation is demonstrated, slightly worse on the right side than left. Mild interstitial opacities are seen. Additional scattered reticular opacities may be atelectatic   in nature. No large pleural effusion or pneumothorax is evident.    BONES: Median sternotomy wires are demonstrated. Mild degenerative changes of the thoracic spine are apparent. There is no fracture or  visible bony lesion.   OTHER: There may be surgical clips at the level of the thoracic inlet. Leads overlie the chest and obscure underlying detail      CXR 11/27/24  Moderate pulmonary edema, progressed since 11/26/2024.      CT c/a/p  CONCLUSION:  Low-lying ETT, 0.8 cm above the jennyfer   Multifocal bilateral pulmonary nodular consolidation s     CXR 11/24/24  FINDINGS:   CARDIAC/VASC: The cardiac silhouette is exaggerated by AP portable technique. There is stable central pulmonary venous congestion.   MEDIAST/HAMLET:   No visible mass or adenopathy.   LUNGS/PLEURA: There are stable streaky opacities at the right lung base.  No pleural effusion or pneumothorax.   BONES: Sternotomy changes are again noted.   OTHER: An endotracheal tube tip projects 3.8 cm above the jennyfer.  An enteric tube again courses into the left upper quadrant.  A right internal jugular approach [place]-Dev catheter tip again projects over the pulmonary outflow tract.  A mediastinal drain tip again projects over the right suprahilar region.      CXR 11/23/24  On my read possible RML infiltrates  CONCLUSION: Post emergent acute type A aortic dissection repair.  Stable cardiomegaly.  Gross stable/satisfactory position of lines and tubes.  Mild pulmonary vascular congestion.       CXR 11/22/24  CONCLUSION: Post feeding tube placement with tip in the distal esophagus approximately 4 cm above the gastroesophageal junction.  Recommend advancement of the tube by approximately 10 cm to place it in the stomach.      CXR 11/22/24  CARDIAC/MEDIASTINUM: The cardiac silhouette is enlarged and unchanged.. There is a right internal jugular [place]-Dev catheter with tip at the level of the main pulmonary artery. There is a right-sided chest tube. Endotracheal tube with tip approximately    5.3 cm above the jennyfer. There is a nasogastric tube with tip and sidehole within the stomach and below the diaphragm. There are median sternotomy wires and postoperative changes  of ascending aortic repair.   LUNGS: There is pulmonary vascular redistribution without edema. Minimal blunting of the bilateral costophrenic angles may be secondary to trace pleural effusions versus chronic pleural thickening. There is mild bibasilar atelectasis. No pneumothorax.   BONES: There is degenerative disease of the thoracic spine.      Chest CTA 11/22/24  CONCLUSION:   1. Type a aortic dissection beginning just above right renal (correction:  Right coronary)  artery and extending distally into the left common iliac artery and external iliac.  Findings were called to Dr. Echavarria at 5:52 p.m.    Medications:    sodium chloride   Intravenous Once    LORazepam  1 mg Intravenous TID    cloNIDine  1 patch Transdermal Weekly    metoprolol  5 mg Intravenous Q6H    piperacillin-tazobactam  3.375 g Intravenous Q8H    vancomycin  12.5 mg/kg Intravenous Q24H    carvedilol  37.5 mg Oral BID with meals    aspirin  81 mg Peg Tube Daily    isosorbide dinitrate  40 mg Per NG Tube TID (Nitrates)    hydrALAZINE  150 mg Oral Q8H GABRIEL    amLODIPine  10 mg Oral Daily    heparin  5,000 Units Subcutaneous Q8H GABRIEL    chlorhexidine gluconate  15 mL Mouth/Throat BID    famotidine  20 mg Intravenous Daily       Assessment & Plan:   ASSESSMENT / PLAN:     Problem List Items Addressed This Visit          HCC Problems    Dissection of ascending aorta (HCC) - Primary    Relevant Medications    sodium chloride 0.9% infusion 1,000 mL (Completed)    prothrombin complex conc (human) (Kcentra) 1,563 Units in 60 mL infusion (Completed)    furosemide (Lasix) 10 mg/mL injection 20 mg (Completed)    heparin (Porcine) 5000 UNIT/ML injection 5,000 Units    amLODIPine (Norvasc) tab 10 mg    amLODIPine (Norvasc) tab 5 mg (Completed)    sodium chloride 0.9% infusion (Completed)    hydrALAZINE (Apresoline) tab 50 mg (Completed)    furosemide (Lasix) 10 mg/mL injection 40 mg (Completed)    labetalol (Trandate) 5 mg/mL injection (Completed)    hydrALAZINE  (Apresoline) tab 150 mg    isosorbide dinitrate (Isordil) tab 40 mg    sodium chloride 0.9% infusion (Completed)    furosemide (Lasix) 10 mg/mL injection 40 mg (Completed)    niCARdipine (carDENE) 125 mg in sodium chloride 0.9% 250 mL infusion    furosemide (Lasix) 10 mg/mL injection 40 mg (Completed)    cloNIDine (Catapres) tab 0.3 mg (Completed)    cloNIDine (Catapres) tab 0.3 mg (Completed)    aspirin chewable tab 81 mg    carvedilol (Coreg) tab 37.5 mg    hydrALAzine (Apresoline) 20 mg/mL injection 10 mg    dexmedeTOMIDine (Precedex) 800 mcg in sodium chloride 0.9% 100 mL infusion    furosemide (Lasix) 10 mg/mL injection 40 mg (Completed)    cloNIDine (Catapres) 0.3 MG/24HR patch 1 patch    metoprolol (Lopressor) 5 mg/5mL injection 5 mg    furosemide (Lasix) 10 mg/mL injection 40 mg (Completed)    furosemide (Lasix) 10 mg/mL injection 40 mg (Completed)    labetalol (Trandate) 400 mg in sodium chloride 0.9% 200 mL infusion    sodium chloride 0.9% infusion     Other Visit Diagnoses       Aortic anomaly (HCC)        Relevant Medications    atropine 0.1 MG/ML injection 1 mg (Completed)    magnesium sulfate in dextrose 5% 1 g/100mL infusion premix 1 g    magnesium sulfate in sterile water for injection 2 g/50mL IVPB premix 2 g    furosemide (Lasix) 10 mg/mL injection 20 mg (Completed)    heparin (Porcine) 5000 UNIT/ML injection 5,000 Units    amLODIPine (Norvasc) tab 10 mg    amLODIPine (Norvasc) tab 5 mg (Completed)    hydrALAZINE (Apresoline) tab 50 mg (Completed)    furosemide (Lasix) 10 mg/mL injection 40 mg (Completed)    labetalol (Trandate) 5 mg/mL injection (Completed)    hydrALAZINE (Apresoline) tab 150 mg    lidocaine PF (Xylocaine-MPF) 1% injection (Completed)    isosorbide dinitrate (Isordil) tab 40 mg    furosemide (Lasix) 10 mg/mL injection 40 mg (Completed)    niCARdipine (carDENE) 125 mg in sodium chloride 0.9% 250 mL infusion    ceFAZolin (Ancef) 3 g in sodium chloride 0.9% 100mL IVPB premix  (Completed)    furosemide (Lasix) 10 mg/mL injection 40 mg (Completed)    cloNIDine (Catapres) tab 0.3 mg (Completed)    cloNIDine (Catapres) tab 0.3 mg (Completed)    aspirin chewable tab 81 mg    carvedilol (Coreg) tab 37.5 mg    vancomycin (Vancocin) 1.75 g in sodium chloride 0.9% 500mL IVPB premix    hydrALAzine (Apresoline) 20 mg/mL injection 10 mg    furosemide (Lasix) 10 mg/mL injection 40 mg (Completed)    lidocaine (cardiac) (Xylocaine) 20 mg/mL injection (Completed)    atropine 0.1 MG/ML injection (Completed)    EPINEPHrine (Adrenalin) 1 MG/10ML (0.1 MG/ML) injection (Cardiac Arrest) (Completed)    cloNIDine (Catapres) 0.3 MG/24HR patch 1 patch    metoprolol (Lopressor) 5 mg/5mL injection 5 mg    furosemide (Lasix) 10 mg/mL injection 40 mg (Completed)    furosemide (Lasix) 10 mg/mL injection 40 mg (Completed)    adult 3 in 1 TPN    labetalol (Trandate) 400 mg in sodium chloride 0.9% 200 mL infusion    Other Relevant Orders    Specimen to Pathology Tissue (Completed)            58 y/o Morbid obesity, hypertension, gastroesophageal reflux disease, chronic kidney disease stage 2 to 3, and hyperlipidemia admitted on 11/21 for Chest pain -CT CT angiogram of the chest, abdomen, and pelvis rule out dissection showed type A aortic dissection s/p emergent repair 11/22/24 , transferred to ICU post op intubated on 11/22, failed SBT, s/p trach 12/4,  PEG 12/5, completed 10 days of abx zsecondary to aspiration event , no with ileus and Abx Vanc and Zosyn resumed for aspiration pneumonia           Ileus   -Gen surgery and GI following   -rpt Abd xray results pending   -PEG tube to LIS   -possibly will need NG   -Continue IV abx   12/13-PEG tube drainage improved   12/14: Repeat KUB this am         Type A aortic dissection   Ileus   S/p emergent repair 11/22/24   CT chest abdomen and pelvis shows bilateral pulmonary nodular consolidation- no focal fluid collection- completed IV antibiotics- continue to monitor off of  them   On IV Reglan- montior QT interval   Antihypertensives being slowly added now that PEG is in place-tube feeds held due to secretions and bowel burden  Continue bowel regimen- miralax bid, tap water enemas- GI on board   Unfortunately stool is now coming out of the peg tube, trach collar and suctioning  Will order another CT scan to see if there is possible obstruction  Will most likely need to place NG tube- discussed with surgery and nursing   Add scopolamine patch   New picc 12/2  CV surgery, cards and critical care on consult.   TTE LVEF 75-80%.   Cont BP control per Cards  -s/p pRBC transfusion on 12/4 with repeat hg stable          - Plan is to eventually discharge to LTAC  PEG tube to low intermittent suction per GI- TFs stopped     Acute hypoxic respiratory failure   Aspiration event   Completed 10 days of zosyn.   Sputum cx candida   Failed SBT multiple times. Plans for trach tomorrow. PEG 12/5   ENT and GI on consult.  Pulmonary on consult.   CXR with multifocal airspace dz  Albuterol nebs   Status post trach placement 12/4  Status post PEG placement  12/5- resume tube feeds   Seroquel started to help with agitation 50 mg BID  On precedex and propofol- unsucessful weaning due to agitation and thick secretions   12/12:  off propofol, on precedex. Psych consulted   12/13 weaning precedex, psych recommends Haldol and ativan PRN       H/o tobacco and ETOH abuse   Duonebs PRN   CIWA protocol  NAIMA on CKD III   Renal on consult.   [place] to be secondary to MARY LOU/ATN   Now on lasix drip to 10 mg/hour   Doppler renal ultrasound unremarkable for renal artery stenosis  If negative Renal will add minoxidil    Essential HTN   Coreg 25 mg BID, norvasc 10mg daily, hydralazine 150mg TID. Clonidinie patch 0.2mg TID   Add hydralazine combination with isosobide 150-40 mg TID   IV lasix given- had 1 liter of urine output - now changed to lasix drip   Webb in place   ARB on hold due to NAIMA.   12/14: On labetalol and Cardene  drip     Iron def anemia  IV iron.   Iron level low   Transfuse for Hb < 7.0   Other medical problems  Morbid Obesity   TANYA     12/12: spoke to sister Mariajose , notify her of current clinical conditions, current treatment and plan. Answered all questions.   12/14: spoke to sister Mariajose , notify her of current clinical conditions, current treatment and plan. Answered all questions.    Quality:  DVT Prophylaxis: Heparin   CODE status: FULL   DISPO: pending clinical improvement.   Estimated date of discharge: To be determined  Discharge is dependent on: Improved clinical status  At this point Patient is expected to be discharge to: Home versus rehab      Plan of care discussed with Patient and RN.     Coordinated care with providers and counseling re: treatment plan and workup     Radha Tidwell MD    Supplementary Documentation:          I personally reviewed the available laboratories, imaging including operative report. I discussed/will discuss the case with patient and her nurse. I ordered laboratories studies. I adjusted medications including not applicable today. Medical decision making high, risk is high.     >55min spent, >50% spent counseling and coordinating care in the form of educating pt/family and d/w consultants and staff. Most of the time spent discussing the above plan.

## 2024-12-14 NOTE — PROGRESS NOTES
[facility]  part of [facility]    Nephrology Progress Note    Chief Complaint   Patient presents with    Chest Pain Angina       Subjective:   59 year old female, following for NAIMA on CKD 3a.     Urine output 2.5L/24 hours.     Review of Systems:   Review of Systems   Unable to perform ROS: Acuity of condition       Objective:   Temp:  [97.2 °F (36.2 °C)-99.7 °F (37.6 °C)] 98.5 °F (36.9 °C)  Pulse:  [73-85] 80  Resp:  [18-28] 18  BP: ()/() 137/58  SpO2:  [99 %-100 %] 100 %  FiO2 (%):  [30 %] 30 %  SpO2: 100 %     Intake/Output Summary (Last 24 hours) at 12/14/2024 1317  Last data filed at 12/14/2024 0600  Gross per 24 hour   Intake 3888.96 ml   Output 2650 ml   Net 1238.96 ml     Wt Readings from Last 3 Encounters:   12/13/24 (!) 309 lb 1.4 oz (140.2 kg)   10/24/24 254 lb (115.2 kg)   05/17/24 249 lb (112.9 kg)     Physical Exam  Constitutional:       Appearance: She is obese.   Neck:      Comments: Pos trach  Cardiovascular:      Rate and Rhythm: Normal rate and regular rhythm.      Heart sounds: Normal heart sounds.   Pulmonary:      Effort: Pulmonary effort is normal.      Breath sounds: Normal breath sounds.   Musculoskeletal:      Comments: Pos anasarca   Skin:     General: Skin is warm and dry.   Neurological:      Comments: Not talking   Psychiatric:      Comments: Not talking         Medications:  Current Facility-Administered Medications   Medication Dose Route Frequency    adult 3 in 1 TPN   Intravenous Continuous TPN    furosemide (Lasix) 10 mg/mL injection 40 mg  40 mg Intravenous Daily    adult 3 in 1 TPN   Intravenous Continuous TPN    labetalol (Trandate) 400 mg in sodium chloride 0.9% 200 mL infusion  0.5-10 mg/min Intravenous Continuous    LORazepam (Ativan) 2 mg/mL injection 1 mg  1 mg Intravenous TID    LORazepam (Ativan) 2 mg/mL injection 2 mg  2 mg Intravenous Q4H PRN    haloperidol lactate (Haldol) 5 MG/ML injection 2 mg  2 mg Intravenous Q6H PRN    dextrose 10%  infusion (TPN no rate)   Intravenous Continuous PRN    fentaNYL (Sublimaze) 50 mcg/mL injection 25 mcg  25 mcg Intravenous Q2H PRN    fentaNYL (Sublimaze) 50 mcg/mL injection 50 mcg  50 mcg Intravenous Q2H PRN    bisacodyl (Dulcolax) 10 MG rectal suppository 10 mg  10 mg Rectal Daily PRN    dexmedeTOMIDine (Precedex) 800 mcg in sodium chloride 0.9% 100 mL infusion  0.2-1.5 mcg/kg/hr (Dosing Weight) Intravenous Continuous    acetaminophen (Ofirmev) 10 mg/mL infusion premix 1,000 mg  1,000 mg Intravenous Q6H PRN    cloNIDine (Catapres) 0.3 MG/24HR patch 1 patch  1 patch Transdermal Weekly    sodium chloride 3 % nebulizer solution 3 mL  3 mL Nebulization PRN    albuterol (Ventolin) (2.5 MG/3ML) 0.083% nebulizer solution 2.5 mg  2.5 mg Nebulization Q6H PRN    piperacillin-tazobactam (Zosyn) 3.375 g in dextrose 5% 100 mL IVPB-ADDV  3.375 g Intravenous Q8H    vancomycin (Vancocin) 1.75 g in sodium chloride 0.9% 500mL IVPB premix  12.5 mg/kg Intravenous Q24H    hydrALAzine (Apresoline) 20 mg/mL injection 10 mg  10 mg Intravenous Q4H PRN    carvedilol (Coreg) tab 37.5 mg  37.5 mg Oral BID with meals    acetaminophen (Tylenol) 160 MG/5ML oral liquid 650 mg  650 mg Oral Q6H PRN    aspirin chewable tab 81 mg  81 mg Peg Tube Daily    niCARdipine (carDENE) 125 mg in sodium chloride 0.9% 250 mL infusion  5-15 mg/hr Intravenous Continuous    isosorbide dinitrate (Isordil) tab 40 mg  40 mg Per NG Tube TID (Nitrates)    hydrALAZINE (Apresoline) tab 150 mg  150 mg Oral Q8H GABRIEL    amLODIPine (Norvasc) tab 10 mg  10 mg Oral Daily    ipratropium-albuterol (Duoneb) 0.5-2.5 (3) MG/3ML inhalation solution 3 mL  3 mL Nebulization Q6H PRN    HYDROcodone-acetaminophen (Norco) 5-325 MG per tab 2 tablet  2 tablet Oral Q4H PRN    heparin (Porcine) 5000 UNIT/ML injection 5,000 Units  5,000 Units Subcutaneous Q8H GABRIEL    melatonin tab 3 mg  3 mg Oral Nightly PRN    potassium chloride 20 mEq/100mL IVPB premix 20 mEq  20 mEq Intravenous PRN    Or     potassium chloride 40 mEq/100mL IVPB premix (central line) 40 mEq  40 mEq Intravenous PRN    magnesium sulfate in dextrose 5% 1 g/100mL infusion premix 1 g  1 g Intravenous PRN    magnesium sulfate in sterile water for injection 2 g/50mL IVPB premix 2 g  2 g Intravenous PRN    chlorhexidine gluconate (Peridex) 0.12 % oral solution 15 mL  15 mL Mouth/Throat BID    famotidine (Pepcid) 20 mg/2mL injection 20 mg  20 mg Intravenous Daily              Results:     Recent Labs   Lab 12/08/24  0637 12/09/24  0609 12/10/24  0607 12/12/24  0443 12/13/24  0904 12/14/24  0631   RBC 3.84 3.18*   < > 2.95* 2.67* 2.73*   HGB 10.9* 9.0*   < > 8.3* 7.3* 7.4*   HCT 33.5* 27.7*   < > 25.3* 23.0* 23.6*   MCV 87.2 87.1   < > 85.8 86.1 86.4   NEPRELIM 6.94 7.06  --  3.64  --   --    WBC 9.9 10.2   < > 6.5 6.5 8.4   .0 165.0   < > 275.0 240.0 254.0    < > = values in this interval not displayed.     Recent Labs   Lab 12/08/24  0637 12/09/24  0609 12/09/24  0720 12/09/24  1453 12/12/24  0443 12/12/24  1825 12/13/24  0612 12/14/24  0631   * 132*  --    < > 122*  --  151* 161*   BUN 38*  --  37*   < > 33*  --  27* 26*   CREATSERUM 1.48* 1.51*  --    < > 1.44*  --  1.28* 1.28*   CA 8.4* 9.1  --    < > 8.8  --  8.4* 8.4*   ALB 3.8 3.7  --   --   --   --  3.3  --     143  --    < > 146*  --  149* 148*   K 5.1  5.1  --  3.9   < > 3.2*  3.2* 3.2* 3.4*  3.4* 4.2  4.2   * 115*  --    < > 116*  --  118* 117*   CO2 18.0* 17.0*  --    < > 19.0*  --  21.0 22.0   ALKPHO  --  101  --   --   --   --  73  --    AST  --   --  24  --   --   --  15  --    ALT  --  28 25  --   --   --  16  --    BILT  --  0.3  --   --   --   --  0.5  --    TP  --  6.7  --   --   --   --  5.8  --     < > = values in this interval not displayed.     PTT   Date Value Ref Range Status   12/04/2024 30.7 23.0 - 36.0 seconds Final     INR   Date Value Ref Range Status   12/04/2024 1.17 0.80 - 1.20 Final     Comment:     Therapeutic INR range for patients  on warfarin:  2.0-3.0 for most medical conditions and surgical prophylaxis   2.5-3.5 for mechanical heart valves and recurrent thromboembolism    Direct thrombin inhibitors (e.g. argatroban, bivalirudin), factor Xa inhibitors (e.g. apixaban, rivaroxaban), and conditions such as coagulation factor deficiency, vitamin K deficiency, and liver disease will prolong the prothrombin time/INR.    Unfractionated heparin and low molecular weight heparin do not affect the prothrombin time/INR up to a concentration of 1 IU/mL and 1.5 IU/mL, respectively.         No results for input(s): \"BNP\" in the last 168 hours.  Recent Labs   Lab 12/12/24  0443 12/13/24  0612 12/14/24  0631   MG 2.4 2.4 2.2   PHOS 3.1 2.6 3.0        Recent Labs   Lab 12/08/24  0637 12/09/24  0609 12/12/24  0443 12/13/24  0612 12/14/24  0631   PHOS 4.2 4.2 3.1 2.6 3.0   ALB 3.8 3.7  --  3.3  --        XR ABDOMEN (1 VIEW) (CPT=74018)    Result Date: 12/14/2024  CONCLUSION:  Significantly limited examination.  Ongoing dilation of small bowel loops up to 4.7 cm, previously 4.5 cm.  Recommend continued radiographic/clinical follow-up.    Dictated by (CST): Karen Ureña MD on 12/14/2024 at 11:02 AM     Finalized by (CST): Karen Ureña MD on 12/14/2024 at 11:04 AM         EKG 12 Lead    Result Date: 12/12/2024  Normal sinus rhythm Nonspecific T wave abnormality Abnormal ECG When compared with ECG of 23-NOV-2024 04:39, Nonspecific T wave abnormality now evident in Anterior-lateral leads Confirmed by landon shankar ([id number]) on 12/12/2024 3:52:29 PM     Assessment and Plan:     NAIMA on CKD stage 3a:   NAIMA secondary to MARY LOU/ATN.   Cr 1.28 today.   Monitor I/Os.     Volume overload.   Furosemide 40 mg IV daily ordered.     Hypernatremia:   Sodium 148 today.   Cont TPN.     Hypertension:  On labetolol and nicardipine drips.     Aortic dissections s/p emergent aortic dissection repair:   CT surgery following.     Respiratory failure:  S/p tracheostomy      Abdominal  distention/ileus:S/p PEG 12/5  Currently tube feed on hold.       Antwan Laird MD  12/14/2024

## 2024-12-14 NOTE — PROGRESS NOTES
Received trach pt on the following vent settings:   12/13/24 0722   Vent Information   Vent Mode PRVC/AC   Settings   FiO2 (%) 30 %   Resp Rate (Set) 18   Vt (Set, mL) 550 mL   Waveform Decelerating ramp   PEEP/CPAP (cm H2O) 5 cm H20     SBT was attempted on 5/5, 30%. Pt was unable to tolerate d/t tachypnea (RR>35) & elevated RSBI (121). Pt was placed back on full support after 5 mins, d/w Dr Rainey. Trach care performed, trach ties remain secure. PRN neb given for exp wheezes w/ elevated PIPs on vent. Suction provided as needed. RT to continue to monitor.

## 2024-12-14 NOTE — PLAN OF CARE
Problem: RESPIRATORY - ADULT  Goal: Achieves optimal ventilation and oxygenation  Description: INTERVENTIONS:  - Assess for changes in respiratory status  - Assess for changes in mentation and behavior  - Position to facilitate oxygenation and minimize respiratory effort  - Oxygen supplementation based on oxygen saturation or ABGs  - Provide Smoking Cessation handout, if applicable  - Encourage broncho-pulmonary hygiene including cough, deep breathe, Incentive Spirometry  - Assess the need for suctioning and perform as needed  - Assess and instruct to report SOB or any respiratory difficulty  - Respiratory Therapy support as indicated  - Manage/alleviate anxiety  - Monitor for signs/symptoms of CO2 retention  Outcome: Progressing   Patient received with tracheostomy and on ventilator PRVC 18/550/+5/30%. Bilateral breath sounds auscultated. Suction provided when indicated. SBT initiated at 1150, see documentation below. No acute events during the daytime. RT will continue to monitor.      SBT: PASS/FAIL? (Fail)  Start time:  1150  Settings:  Pressure Support: 10    CPAP: 5    FiO2:  30  Reason for Failure if present:   RR <10, >35   Patient RR = 41      Weaning Parameters:  RR: 30  RSBI: 58  NIF: -26  VT: 510  VC: 650      Returned to Full support: (Time:1215)  MD notified: (Physician: Redd)  Current Ventilator Settings:   - Mode: PRVC   - Rate: 18   - Tidal Volume:550   - FiO2: 30   - PEEP +5

## 2024-12-14 NOTE — PROGRESS NOTES
Pulmonary/ICU/Critical Care Progress Note        Reason for Consultation: post op care  Referring Physician: Dr. Gregg    Seen this am.     SUBJECTIVE:  Afebrile  Remains on labetolol/nicardipine  Needed fentanyl overnight for agitation  KUB unchanged. PEG to suction   Opens eyes intermittently follows commands  Blood transfusion    ALLERGIES:  Allergies[1]        MEDS:  Home Medications:  Medications Taking[2]    Scheduled Medication:   furosemide  40 mg Intravenous Daily    LORazepam  1 mg Intravenous TID    cloNIDine  1 patch Transdermal Weekly    piperacillin-tazobactam  3.375 g Intravenous Q8H    vancomycin  12.5 mg/kg Intravenous Q24H    carvedilol  37.5 mg Oral BID with meals    aspirin  81 mg Peg Tube Daily    isosorbide dinitrate  40 mg Per NG Tube TID (Nitrates)    hydrALAZINE  150 mg Oral Q8H GABRIEL    amLODIPine  10 mg Oral Daily    heparin  5,000 Units Subcutaneous Q8H GABRIEL    chlorhexidine gluconate  15 mL Mouth/Throat BID    famotidine  20 mg Intravenous Daily     Continuous Infusing Medication:   adult 3 in 1 TPN      adult 3 in 1 TPN 83.3 mL/hr at 12/13/24 2143    labetalol (Trandate) 400 mg in sodium chloride 0.9% 200 mL infusion 0.5 mg/min (12/14/24 1200)    dextrose 10%      dexmedetomidine 0.7 mcg/kg/hr (12/14/24 1200)    niCARdipine 15 mg/hr (12/14/24 1200)     PRN Medications:    LORazepam    haloperidol lactate    dextrose 10%    fentaNYL    fentaNYL    bisacodyl    acetaminophen    sodium chloride    albuterol    hydrALAzine    acetaminophen    ipratropium-albuterol    HYDROcodone-acetaminophen    melatonin    potassium chloride **OR** potassium chloride    magnesium sulfate in dextrose 5%    magnesium sulfate in sterile water for injection       PHYSICAL EXAM:  /58   Pulse 80   Temp 98.5 °F (36.9 °C) (Temporal)   Resp 18   Ht 5' 5\" (1.651 m)   Wt (!) 309 lb 1.4 oz (140.2 kg)   SpO2 100%   BMI 51.43 kg/m²   Vent Mode: PRVC/AC  FiO2 (%):  [30 %] 30 %  S RR:  [18] 18  S VT:  [550 mL]  550 mL  PEEP/CPAP (cm H2O):  [5 cm H20] 5 cm H20  MAP (cm H2O):  [13-15] 15  CONSTITUTIONAL: intubated opens eyes follows commands  HEENT: atraumatic normocephalic  MOUTH: MMM, large tongue  NECK/THROAT: no JVD. Trachea midline. No obvious thyromegaly. + trach with min bleeding   LUNG: vented upper clear BS b/l no wheezing, + crackles. Diminished at bases. Chest symmetric with respiratory motion. + midsternal surgical wound healing   HEART: regular rate and rhythm, no obvious murmers or gallops noted  ABD: soft distended, less tender today. + hypoactive bowel sounds. No organomegaly noted. + PEG tube to LIS  EXT: no clubbing, cyanosis, or edema noted. Pulses intact grossly  NEURO/MUSCULOSKELETAL: intubated, opens eyes, follows commands, wiggles toes   SKIN: warm, dry. No obvious lesions noted  LYMPH: no obvious LAD      IMAGES:   KUB 12/14/24  No change in dilation of SB  Significantly limited examination.   Ongoing dilation of small bowel loops up to 4.7 cm, previously 4.5 cm. Recommend continued radiographic/clinical follow-up.         CXR 12/12/24  CONCLUSION:   1. Cardiomegaly.  Tortuous aorta.   2. Bilateral mixed multifocal airspace consolidation with slight improved aeration left lung base.   3. Tracheostomy tube overlies the tracheal air column.  Right PICC line with the tip in the SVC.      KUB 12/12/24  CONCLUSION:   1. Nonspecific small bowel dilatation left mid abdomen and central abdomen measures up to 4.5 cm.   2. Mild stool scattered throughout the colon suggests constipation fecal retention.      CT A/P 12/10/24  CONCLUSION:   1. Fluid-filled loops of small bowel, which could reflect underlying infectious/inflammatory enteritis or malabsorption in the appropriate clinical setting. Bowel loops are mildly dilated without clear transition point to suggest mechanical bowel   obstruction, although early or partial bowel obstruction could have a similar appearance.   2. Extensive distal colonic  diverticulosis without CT evidence of acute complication.    3. Bladder distension despite Webb catheter placement.   4. Hepatomegaly with hepatic steatosis.   5. Diffuse anasarca.   6. Partially visualized postthoracotomy chest with possible postoperative seroma/hematoma of the anterior chest wall.   7. Bilateral pleural effusions and associated basilar atelectasis, with or without superimposed pneumonia.   8. Additional scattered patchy nodular opacities may reflect infectious process.    9. Low-density appearance of the intracardiac blood pool raises the possibility of underlying anemia. Correlate with hematologic parameters.   10. Lesser incidental findings as above.     CXR 12/9/24  CONCLUSION:   1. Cardiomegaly.  Atherosclerotic aneurysmal thoracic aorta   2. Tracheostomy tube overlies tracheal air column.   3.  Bilateral mixed alveolar and interstitial multifocal airspace opacification has progressed.        KUB 12/9/24  CONCLUSION:   No huang dilatation of the small bowel to suggest small bowel obstruction.   Large cecal stool burden which may indicate constipation. Mild stool burden throughout the remainder of the colon.     CXR 12/5/24  No significant change when compared to 12/04/2024.     CXR 12/4/24  CONCLUSION:   1. Mild cardiomegaly and minimally prominent pulmonary vascularity but no huang pulmonary edema.  ET tube and NG tubes have been removed.  Tracheostomy is now noted in the trachea extending to the level the clavicles.  No pneumothorax.   2. Persistent patchy bilateral upper lobe airspace disease right more than left suggesting persistent bilateral upper lobe pneumonia.  Correlate clinically and continued follow-up is advised.     CXR 12/2/24  FINDINGS:   POSITION: The patient is semi-erect and slightly rotated to the right.   DEVICES: There is an endotracheal tube terminating approximately 3.9 cm above the jennyfer.  A large-bore right internal jugular central venous vascular access sheath has  tip projecting in the SVC. An enteric tube extends caudally beyond the field of view.   CARDIAC/VASC: The cardiomediastinal silhouette is accentuated by AP technique, but likely stably enlarged. There is mild tortuosity of the thoracic aorta with peripheral atherosclerotic vascular calcification. The pulmonary vascularity is within normal   limits.   MEDIAST/HAMLET: Surgical clips are present.   LUNGS/PLEURA: Elevation right hemidiaphragm is noted. Multifocal patchy airspace consolidation is demonstrated, slightly worse on the right side than left. Mild interstitial opacities are seen. Additional scattered reticular opacities may be atelectatic   in nature. No large pleural effusion or pneumothorax is evident.    BONES: Median sternotomy wires are demonstrated. Mild degenerative changes of the thoracic spine are apparent. There is no fracture or visible bony lesion.   OTHER: There may be surgical clips at the level of the thoracic inlet. Leads overlie the chest and obscure underlying detail     CXR 11/27/24  Moderate pulmonary edema, progressed since 11/26/2024.     CT c/a/p  CONCLUSION:  Low-lying ETT, 0.8 cm above the jennyfer   Multifocal bilateral pulmonary nodular consolidation s    CXR 11/24/24  FINDINGS:   CARDIAC/VASC: The cardiac silhouette is exaggerated by AP portable technique. There is stable central pulmonary venous congestion.   MEDIAST/HAMLET:   No visible mass or adenopathy.   LUNGS/PLEURA: There are stable streaky opacities at the right lung base.  No pleural effusion or pneumothorax.   BONES: Sternotomy changes are again noted.   OTHER: An endotracheal tube tip projects 3.8 cm above the jennyfer.  An enteric tube again courses into the left upper quadrant.  A right internal jugular approach [place]-Dev catheter tip again projects over the pulmonary outflow tract.  A mediastinal drain tip again projects over the right suprahilar region.     CXR 11/23/24  On my read possible RML infiltrates  CONCLUSION: Post  emergent acute type A aortic dissection repair.  Stable cardiomegaly.  Gross stable/satisfactory position of lines and tubes.  Mild pulmonary vascular congestion.       CXR 11/22/24  CONCLUSION: Post feeding tube placement with tip in the distal esophagus approximately 4 cm above the gastroesophageal junction.  Recommend advancement of the tube by approximately 10 cm to place it in the stomach.     CXR 11/22/24  CARDIAC/MEDIASTINUM: The cardiac silhouette is enlarged and unchanged.. There is a right internal jugular [place]-Dev catheter with tip at the level of the main pulmonary artery. There is a right-sided chest tube. Endotracheal tube with tip approximately    5.3 cm above the jennyfer. There is a nasogastric tube with tip and sidehole within the stomach and below the diaphragm. There are median sternotomy wires and postoperative changes of ascending aortic repair.   LUNGS: There is pulmonary vascular redistribution without edema. Minimal blunting of the bilateral costophrenic angles may be secondary to trace pleural effusions versus chronic pleural thickening. There is mild bibasilar atelectasis. No pneumothorax.   BONES: There is degenerative disease of the thoracic spine.     Chest CTA 11/22/24  CONCLUSION:   1. Type a aortic dissection beginning just above right renal (correction:  Right coronary)  artery and extending distally into the left common iliac artery and external iliac.  Findings were called to Dr. Echavarria at 5:52 p.m.       LABS:  Recent Labs   Lab 12/08/24  0637 12/09/24  0609 12/10/24  0607 12/12/24  0443 12/13/24  0904 12/14/24  0631   RBC 3.84 3.18*   < > 2.95* 2.67* 2.73*   HGB 10.9* 9.0*   < > 8.3* 7.3* 7.4*   HCT 33.5* 27.7*   < > 25.3* 23.0* 23.6*   MCV 87.2 87.1   < > 85.8 86.1 86.4   MCH 28.4 28.3   < > 28.1 27.3 27.1   MCHC 32.5 32.5   < > 32.8 31.7 31.4   RDW 17.9* 17.3*   < > 16.8* 17.0* 17.1*   NEPRELIM 6.94 7.06  --  3.64  --   --    WBC 9.9 10.2   < > 6.5 6.5 8.4   .0 165.0   <  > 275.0 240.0 254.0    < > = values in this interval not displayed.       Recent Labs   Lab 12/08/24  0637 12/09/24  0609 12/09/24  0720 12/09/24  1453 12/12/24  0443 12/12/24  1825 12/13/24  0612 12/14/24  0631   * 132*  --    < > 122*  --  151* 161*   BUN 38*  --  37*   < > 33*  --  27* 26*   CREATSERUM 1.48* 1.51*  --    < > 1.44*  --  1.28* 1.28*   EGFRCR 40* 39*  --    < > 41*  --  48* 48*   CA 8.4* 9.1  --    < > 8.8  --  8.4* 8.4*   ALB 3.8 3.7  --   --   --   --  3.3  --     143  --    < > 146*  --  149* 148*   K 5.1  5.1  --  3.9   < > 3.2*  3.2* 3.2* 3.4*  3.4* 4.2  4.2   * 115*  --    < > 116*  --  118* 117*   CO2 18.0* 17.0*  --    < > 19.0*  --  21.0 22.0   ALKPHO  --  101  --   --   --   --  73  --    AST  --   --  24  --   --   --  15  --    ALT  --  28 25  --   --   --  16  --    BILT  --  0.3  --   --   --   --  0.5  --    TP  --  6.7  --   --   --   --  5.8  --     < > = values in this interval not displayed.       ASSESSMENT/PLAN:  Type A aortic dissection s/p repair now intubated in ICU  -on nicardipine/labetolol  -multiple oral antiHTN meds for BP being held d/t ileus/SBO  -s/p pRBC transfusion  today for anemia. No obvious s/s bleeding    Post op acute respiratory failure  -complicated by extubation and reimergent intubation and significant emesis concerning for aspiration PNA vs pneumonitis  -started on zosyn-completed 10 day course  -checked ETT asp-candida likely contaminant  -failed multiple SBTs d/t increased RR, work of breathing, hypertension, hypoxemia, significant thick secretions  -remains MV dependent   -hx of likely TANYA and previous smoking hx. Has sign dense consolidations and atelectasis on chest CT  -s/p trach by ENT on 12/4/24 and PEG by GI on 12/5/24  -start trach/vent weaning as able-limited by sedation needs, BP, and GI issues  -PRN ABG and CXR  -saline nebs  -on propofol and precedex-having difficulty weaning as pt becomes agitated and hypertensive.  Started seroquel 50 mg BID but holding since can't use PEG tube. Started fentanyl but this will contribute to ileus  -psych consult for assistance with sedation and agitation-defer to their recs  -on scheduled clonidine patch in attempt to wean off precedex  -restarted on vanco/zosyn for aspiration PNA. Stop vanco  -repeat SBT this am-lasted 20 min     Previous hx of smoking and ETOH  -continue duonebs PRN    NAIMA on CKD and metabolic acidosis  -likely from surgery, and acute issues  -monitor UO and renal labs  -renal following   -diuresis as per cardiology and renal    Abd pain  -s/p abd CT as above  -not tolerating TF now  -PEG tube to LIS  -GI and surgery following. Possible CT with oral contrast tomorrow    Proph:  -DVT: hep sq    Dispo:  -full code  -SW/ to assist with LTAC placement    Critical care time 30 min independent of procedures      Thank you for the opportunity to care for Alisha Casiano      SIDDHARTH Rainey DO, MPH  Pulmonary Critical Care Medicine  [place] [facility]                         [1]   Allergies  Allergen Reactions    Atorvastatin MYALGIA    Pravastatin MYALGIA    Rosuvastatin MYALGIA    Seasonal Runny nose   [2]   Outpatient Medications Marked as Taking for the 11/21/24 encounter (Hospital Encounter)   Medication Sig Dispense Refill    Acetaminophen ER (TYLENOL 8 HOUR) 650 MG Oral Tab CR Take 2 tablets (1,300 mg total) by mouth daily as needed.      Calcium Carb-Cholecalciferol 600-10 MG-MCG Oral Tab Take 1 tablet by mouth daily.      metoprolol succinate ER 50 MG Oral Tablet 24 Hr Take 1 tablet (50 mg total) by mouth daily. 90 tablet 3    Losartan Potassium-HCTZ 100-12.5 MG Oral Tab Take 1 tablet by mouth daily. 90 tablet 3    Potassium Chloride ER 20 MEQ Oral Tab CR Take 1 tablet by mouth daily. 90 tablet 3    albuterol 108 (90 Base) MCG/ACT Inhalation Aero Soln Inhale 2 puffs into the lungs every 4 (four) hours as needed for Wheezing. 3  each 3    amLODIPine 10 MG Oral Tab Take 1 tablet (10 mg total) by mouth daily. 90 tablet 3    Ascorbic Acid (VITAMIN C) 1000 MG Oral Tab Take 1 tablet (1,000 mg total) by mouth daily.      vitamin B-12 50 MCG Oral Tab Take 1 tablet (50 mcg total) by mouth daily.

## 2024-12-15 ENCOUNTER — APPOINTMENT (OUTPATIENT)
Dept: GENERAL RADIOLOGY | Facility: HOSPITAL | Age: 59
DRG: 003 | End: 2024-12-15
Attending: NURSE PRACTITIONER
Payer: COMMERCIAL

## 2024-12-15 ENCOUNTER — APPOINTMENT (OUTPATIENT)
Dept: GENERAL RADIOLOGY | Facility: HOSPITAL | Age: 59
End: 2024-12-15
Attending: NURSE PRACTITIONER
Payer: COMMERCIAL

## 2024-12-15 LAB
ALBUMIN SERPL-MCNC: 3.2 G/DL (ref 3.2–4.8)
ALBUMIN/GLOB SERPL: 1.3 {RATIO} (ref 1–2)
ALP LIVER SERPL-CCNC: 68 U/L
ALT SERPL-CCNC: 23 U/L
ANION GAP SERPL CALC-SCNC: 7 MMOL/L (ref 0–18)
ANION GAP SERPL CALC-SCNC: 7 MMOL/L (ref 0–18)
AST SERPL-CCNC: 25 U/L (ref ?–34)
BILIRUB SERPL-MCNC: 0.6 MG/DL (ref 0.2–1.1)
BLOOD TYPE BARCODE: 7300
BUN BLD-MCNC: 27 MG/DL (ref 9–23)
BUN BLD-MCNC: 27 MG/DL (ref 9–23)
BUN/CREAT SERPL: 21.8 (ref 10–20)
BUN/CREAT SERPL: 21.8 (ref 10–20)
CALCIUM BLD-MCNC: 8.5 MG/DL (ref 8.7–10.4)
CALCIUM BLD-MCNC: 8.5 MG/DL (ref 8.7–10.4)
CHLORIDE SERPL-SCNC: 116 MMOL/L (ref 98–112)
CHLORIDE SERPL-SCNC: 116 MMOL/L (ref 98–112)
CO2 SERPL-SCNC: 23 MMOL/L (ref 21–32)
CO2 SERPL-SCNC: 23 MMOL/L (ref 21–32)
CREAT BLD-MCNC: 1.24 MG/DL
CREAT BLD-MCNC: 1.24 MG/DL
DEPRECATED RDW RBC AUTO: 61.1 FL (ref 35.1–46.3)
EGFRCR SERPLBLD CKD-EPI 2021: 50 ML/MIN/1.73M2 (ref 60–?)
EGFRCR SERPLBLD CKD-EPI 2021: 50 ML/MIN/1.73M2 (ref 60–?)
ERYTHROCYTE [DISTWIDTH] IN BLOOD BY AUTOMATED COUNT: 20.2 % (ref 11–15)
GLOBULIN PLAS-MCNC: 2.5 G/DL (ref 2–3.5)
GLUCOSE BLD-MCNC: 160 MG/DL (ref 70–99)
GLUCOSE BLD-MCNC: 160 MG/DL (ref 70–99)
GLUCOSE BLDC GLUCOMTR-MCNC: 131 MG/DL (ref 70–99)
GLUCOSE BLDC GLUCOMTR-MCNC: 147 MG/DL (ref 70–99)
GLUCOSE BLDC GLUCOMTR-MCNC: 154 MG/DL (ref 70–99)
GLUCOSE BLDC GLUCOMTR-MCNC: 157 MG/DL (ref 70–99)
HCT VFR BLD AUTO: 25.2 %
HGB BLD-MCNC: 8 G/DL
MAGNESIUM SERPL-MCNC: 2.1 MG/DL (ref 1.6–2.6)
MCH RBC QN AUTO: 26.8 PG (ref 26–34)
MCHC RBC AUTO-ENTMCNC: 31.7 G/DL (ref 31–37)
MCV RBC AUTO: 84.6 FL
OSMOLALITY SERPL CALC.SUM OF ELEC: 311 MOSM/KG (ref 275–295)
OSMOLALITY SERPL CALC.SUM OF ELEC: 311 MOSM/KG (ref 275–295)
PHOSPHATE SERPL-MCNC: 4 MG/DL (ref 2.4–5.1)
PLATELET # BLD AUTO: 216 10(3)UL (ref 150–450)
POTASSIUM SERPL-SCNC: 4.6 MMOL/L (ref 3.5–5.1)
POTASSIUM SERPL-SCNC: 4.6 MMOL/L (ref 3.5–5.1)
PROT SERPL-MCNC: 5.7 G/DL (ref 5.7–8.2)
RBC # BLD AUTO: 2.98 X10(6)UL
SODIUM SERPL-SCNC: 146 MMOL/L (ref 136–145)
SODIUM SERPL-SCNC: 146 MMOL/L (ref 136–145)
UNIT VOLUME: 350 ML
WBC # BLD AUTO: 7.5 X10(3) UL (ref 4–11)

## 2024-12-15 PROCEDURE — 99232 SBSQ HOSP IP/OBS MODERATE 35: CPT | Performed by: INTERNAL MEDICINE

## 2024-12-15 PROCEDURE — 71045 X-RAY EXAM CHEST 1 VIEW: CPT | Performed by: NURSE PRACTITIONER

## 2024-12-15 PROCEDURE — 99233 SBSQ HOSP IP/OBS HIGH 50: CPT | Performed by: FAMILY MEDICINE

## 2024-12-15 PROCEDURE — 99233 SBSQ HOSP IP/OBS HIGH 50: CPT | Performed by: OTHER

## 2024-12-15 PROCEDURE — 99233 SBSQ HOSP IP/OBS HIGH 50: CPT | Performed by: INTERNAL MEDICINE

## 2024-12-15 PROCEDURE — 99291 CRITICAL CARE FIRST HOUR: CPT | Performed by: INTERNAL MEDICINE

## 2024-12-15 RX ORDER — LORAZEPAM 2 MG/ML
1 INJECTION INTRAMUSCULAR EVERY 4 HOURS PRN
Status: DISCONTINUED | OUTPATIENT
Start: 2024-12-15 | End: 2024-12-23

## 2024-12-15 RX ORDER — LORAZEPAM 2 MG/ML
0.5 INJECTION INTRAMUSCULAR 4 TIMES DAILY
Status: DISCONTINUED | OUTPATIENT
Start: 2024-12-15 | End: 2024-12-23

## 2024-12-15 NOTE — PLAN OF CARE
Problem: Patient Centered Care  Goal: Patient preferences are identified and integrated in the patient's plan of care  Description: Interventions:  - What would you like us to know as we care for you? I work at [employer] and I am still very good friends with people from grade school! My brother, Osbaldo Prince, has been making decisions for me.  - Provide timely, complete, and accurate information to patient/family  - Incorporate patient and family knowledge, values, beliefs, and cultural backgrounds into the planning and delivery of care  - Encourage patient/family to participate in care and decision-making at the level they choose  - Honor patient and family perspectives and choices  Outcome: Progressing     Problem: Diabetes/Glucose Control  Goal: Glucose maintained within prescribed range  Description: INTERVENTIONS:  - Monitor Blood Glucose as ordered  - Assess for signs and symptoms of hyperglycemia and hypoglycemia  - Administer ordered medications to maintain glucose within target range  - Assess barriers to adequate nutritional intake and initiate nutrition consult as needed  - Instruct patient on self management of diabetes  Outcome: Progressing     Problem: Patient/Family Goals  Goal: Patient/Family Long Term Goal  Description: Patient's Long Term Goal: To discharge from the hospital safely    Interventions:  - surgical interventions, rounding, medications  - See additional Care Plan goals for specific interventions  Outcome: Progressing  Goal: Patient/Family Short Term Goal  Description: Patient's Short Term Goal: to breathe better    Interventions:   - manage the ventilator for pt until she is able to breath on her own.  - See additional Care Plan goals for specific interventions  Outcome: Progressing     Problem: RESPIRATORY - ADULT  Goal: Achieves optimal ventilation and oxygenation  Description: INTERVENTIONS:  - Assess for changes in respiratory status  - Assess for changes in mentation and  behavior  - Position to facilitate oxygenation and minimize respiratory effort  - Oxygen supplementation based on oxygen saturation or ABGs  - Provide Smoking Cessation handout, if applicable  - Encourage broncho-pulmonary hygiene including cough, deep breathe, Incentive Spirometry  - Assess the need for suctioning and perform as needed  - Assess and instruct to report SOB or any respiratory difficulty  - Respiratory Therapy support as indicated  - Manage/alleviate anxiety  - Monitor for signs/symptoms of CO2 retention  Outcome: Progressing     Problem: GASTROINTESTINAL - ADULT  Goal: Minimal or absence of nausea and vomiting  Description: INTERVENTIONS:  - Maintain adequate hydration with IV or PO as ordered and tolerated  - Nasogastric tube to low intermittent suction as ordered  - Evaluate effectiveness of ordered antiemetic medications  - Provide nonpharmacologic comfort measures as appropriate  - Advance diet as tolerated, if ordered  - Obtain nutritional consult as needed  - Evaluate fluid balance  Outcome: Progressing  Goal: Maintains or returns to baseline bowel function  Description: INTERVENTIONS:  - Assess bowel function  - Maintain adequate hydration with IV or PO as ordered and tolerated  - Evaluate effectiveness of GI medications  - Encourage mobilization and activity  - Obtain nutritional consult as needed  - Establish a toileting routine/schedule  - Consider collaborating with pharmacy to review patient's medication profile  Outcome: Progressing     Problem: METABOLIC/FLUID AND ELECTROLYTES - ADULT  Goal: Glucose maintained within prescribed range  Description: INTERVENTIONS:  - Monitor Blood Glucose as ordered  - Assess for signs and symptoms of hyperglycemia and hypoglycemia  - Administer ordered medications to maintain glucose within target range  - Assess barriers to adequate nutritional intake and initiate nutrition consult as needed  - Instruct patient on self management of diabetes  Outcome:  Progressing  Goal: Electrolytes maintained within normal limits  Description: INTERVENTIONS:  - Monitor labs and rhythm and assess patient for signs and symptoms of electrolyte imbalances  - Administer electrolyte replacement as ordered  - Monitor response to electrolyte replacements, including rhythm and repeat lab results as appropriate  - Fluid restriction as ordered  - Instruct patient on fluid and nutrition restrictions as appropriate  Outcome: Progressing  Goal: Hemodynamic stability and optimal renal function maintained  Description: INTERVENTIONS:  - Monitor labs and assess for signs and symptoms of volume excess or deficit  - Monitor intake, output and patient weight  - Monitor urine specific gravity, serum osmolarity and serum sodium as indicated or ordered  - Monitor response to interventions for patient's volume status, including labs, urine output, blood pressure (other measures as available)  - Encourage oral intake as appropriate  - Instruct patient on fluid and nutrition restrictions as appropriate  Outcome: Progressing     Problem: CARDIOVASCULAR - ADULT  Goal: Maintains optimal cardiac output and hemodynamic stability  Description: INTERVENTIONS:  - Monitor vital signs, rhythm, and trends  - Monitor for bleeding, hypotension and signs of decreased cardiac output  - Evaluate effectiveness of vasoactive medications to optimize hemodynamic stability  - Monitor arterial and/or venous puncture sites for bleeding and/or hematoma  - Assess quality of pulses, skin color and temperature  - Assess for signs of decreased coronary artery perfusion - ex. Angina  - Evaluate fluid balance, assess for edema, trend weights  Outcome: Progressing  Goal: Absence of cardiac arrhythmias or at baseline  Description: INTERVENTIONS:  - Continuous cardiac monitoring, monitor vital signs, obtain 12 lead EKG if indicated  - Evaluate effectiveness of antiarrhythmic and heart rate control medications as ordered  - Initiate  emergency measures for life threatening arrhythmias  - Monitor electrolytes and administer replacement therapy as ordered  Outcome: Progressing     Problem: SKIN/TISSUE INTEGRITY - ADULT  Goal: Skin integrity remains intact  Description: INTERVENTIONS  - Assess and document risk factors for pressure ulcer development  - Assess and document skin integrity  - Monitor for areas of redness and/or skin breakdown  - Initiate interventions, skin care algorithm/standards of care as needed  Outcome: Progressing  Goal: Incision(s), wounds(s) or drain site(s) healing without S/S of infection  Description: INTERVENTIONS:  - Assess and document risk factors for pressure ulcer development  - Assess and document skin integrity  - Assess and document dressing/incision, wound bed, drain sites and surrounding tissue  - Implement wound care per orders  - Initiate isolation precautions as appropriate  - Initiate Pressure Ulcer prevention bundle as indicated  Outcome: Progressing  Goal: Oral mucous membranes remain intact  Description: INTERVENTIONS  - Assess oral mucosa and hygiene practices  - Implement preventative oral hygiene regimen  - Implement oral medicated treatments as ordered  Outcome: Progressing     Problem: HEMATOLOGIC - ADULT  Goal: Maintains hematologic stability  Description: INTERVENTIONS  - Assess for signs and symptoms of bleeding or hemorrhage  - Monitor labs and vital signs for trends  - Administer supportive blood products/factors, fluids and medications as ordered and appropriate  - Administer supportive blood products/factors as ordered and appropriate  Outcome: Progressing     Problem: MUSCULOSKELETAL - ADULT  Goal: Return mobility to safest level of function  Description: INTERVENTIONS:  - Assess patient stability and activity tolerance for standing, transferring and ambulating w/ or w/o assistive devices  - Assist with transfers and ambulation using safe patient handling equipment as needed  - Ensure  adequate protection for wounds/incisions during mobilization  - Obtain PT/OT consults as needed  - Advance activity as appropriate  - Communicate ordered activity level and limitations with patient/family  Outcome: Progressing  Goal: Maintain proper alignment of affected body part  Description: INTERVENTIONS:  - Support and protect limb and body alignment per provider's orders  - Instruct and reinforce with patient and family use of appropriate assistive device and precautions (e.g. spinal or hip dislocation precautions)  Outcome: Progressing     Problem: NEUROLOGICAL - ADULT  Goal: Achieves stable or improved neurological status  Description: INTERVENTIONS  - Assess for and report changes in neurological status  - Initiate measures to prevent increased intracranial pressure  - Maintain blood pressure and fluid volume within ordered parameters to optimize cerebral perfusion and minimize risk of hemorrhage  - Monitor temperature, glucose, and sodium. Initiate appropriate interventions as ordered  Outcome: Progressing     Problem: PAIN - ADULT  Goal: Verbalizes/displays adequate comfort level or patient's stated pain goal  Description: INTERVENTIONS:  - Encourage pt to monitor pain and request assistance  - Assess pain using appropriate pain scale  - Administer analgesics based on type and severity of pain and evaluate response  - Implement non-pharmacological measures as appropriate and evaluate response  - Consider cultural and social influences on pain and pain management  - Manage/alleviate anxiety  - Utilize distraction and/or relaxation techniques  - Monitor for opioid side effects  - Notify MD/LIP if interventions unsuccessful or patient reports new pain  - Anticipate increased pain with activity and pre-medicate as appropriate  Outcome: Progressing     Problem: Delirium  Goal: Minimize duration of delirium  Description: Interventions:  - Encourage use of hearing aids, eye glasses  - Promote highest level of  mobility daily  - Provide frequent reorientation  - Promote wakefulness i.e. lights on, blinds open  - Promote sleep, encourage patient's normal rest cycle i.e. lights off, TV off, minimize noise and interruptions  - Encourage family to assist in orientation and promotion of home routines  Outcome: Progressing

## 2024-12-15 NOTE — PROGRESS NOTES
2103 Precedex placed on Standby for SAT. Pt was able to squeeze hands weakly, gesture/nod yes or no, and wiggle toes. Sedation turned back on for ventilator compliance and increased work of breathing at 2110.

## 2024-12-15 NOTE — PROGRESS NOTES
Patient is a 59 year old -American, single female with past medical history of CKD, hypertension, high cholesterol who was admitted to the hospital for aortic dissection. The patient has been demonstrating alternation in mood and cognition with episodes of increased restlessness and agitation. According to team, they are trying to wean patient of propofol and precedex. Patient indicated for psych consult for evaluation and advise.  Consult Duration     The patient seen for over 50-minute, follow-up evaluation, over 50% counseling and coordinating care addressing agitation, restlessness, medication management..    Record reviewed, communication with attending, communication with RN and patient seen face to face evaluation.  History of Present Illness:     Communicating RN and attending indicated that the patient continued demonstrating restlessness and agitation for that she has been under Precedex and she has been taking fentanyl IV.    The patient is sedated and confused.  Patient has been demonstrating increased anxiety and restlessness.  Her Precedex level was 0.6.  Patient not communicating verbally with nodding her head at time.    According to RN the patient has been responding to Haldol for couple hour then her anxiety will come back.    The patient received the following psychotropic medications: clonidine 0.3 mg Patch, ativan 1 mg IV TID. PRN haldol and ativan given. Precedex continued.     Labs and imaging reviewed: Glucose 151, sodium 149, potassium 4.3, Bun 27, creatinine 1.28     Provided patient with supportive psychotherapy including emotional support and encouragement.    Otherwise the patient has been through a lot with increased anxiety mixed with confusion and irritability    Past Medical History  Past Medical History:    Chronic kidney disease (CKD)    Esophageal reflux    High blood pressure    High cholesterol    HYPERTENSION    Hypertension    Unspecified essential hypertension        Past Surgical History  Past Surgical History:   Procedure Laterality Date    Colonoscopy N/A 2018    Procedure: COLONOSCOPY;  Surgeon: Magdy Webber MD;  Location: [facility] ENDOSCOPY    Endometrial ablation      child passed away for 5 mins          1    Other surgical history  11    cysto-Dr. Lacey -- pt denies       Family History  Family History   Problem Relation Age of Onset    Hypertension Mother     Heart Disorder Mother 70    Other (Other) Mother         kidney failure    Hypertension Father     Hypertension Maternal Grandfather     Cancer Neg     Diabetes Neg     Glaucoma Neg     Macular degeneration Neg        Social History  Social History     Socioeconomic History    Marital status: Single   Tobacco Use    Smoking status: Former     Current packs/day: 0.00     Average packs/day: 1 pack/day for 13.0 years (13.0 ttl pk-yrs)     Types: Cigarettes     Start date:      Quit date:      Years since quittin.9    Smokeless tobacco: Former   Vaping Use    Vaping status: Never Used   Substance and Sexual Activity    Alcohol use: Yes     Alcohol/week: 1.0 - 2.0 standard drink of alcohol     Types: 1 - 2 Cans of beer per week     Comment: every day    Drug use: No     Social Drivers of Health     Food Insecurity: Unknown (2024)    Food Insecurity     Food Insecurity: Patient unable to answer   Transportation Needs: Unknown (2024)    Transportation Needs     Lack of Transportation: Patient unable to answer   Housing Stability: Unknown (2024)    Housing Stability     Housing Instability: Patient unable to answer           Current Medications:  Current Facility-Administered Medications   Medication Dose Route Frequency    adult 3 in 1 TPN   Intravenous Continuous TPN    furosemide (Lasix) 10 mg/mL injection 40 mg  40 mg Intravenous Daily    haloperidol lactate (Haldol) 5 MG/ML injection 2 mg  2 mg Intravenous TID    adult 3 in 1 TPN   Intravenous Continuous TPN     labetalol (Trandate) 400 mg in sodium chloride 0.9% 200 mL infusion  0.5-10 mg/min Intravenous Continuous    LORazepam (Ativan) 2 mg/mL injection 1 mg  1 mg Intravenous TID    LORazepam (Ativan) 2 mg/mL injection 2 mg  2 mg Intravenous Q4H PRN    haloperidol lactate (Haldol) 5 MG/ML injection 2 mg  2 mg Intravenous Q6H PRN    dextrose 10% infusion (TPN no rate)   Intravenous Continuous PRN    fentaNYL (Sublimaze) 50 mcg/mL injection 25 mcg  25 mcg Intravenous Q2H PRN    fentaNYL (Sublimaze) 50 mcg/mL injection 50 mcg  50 mcg Intravenous Q2H PRN    bisacodyl (Dulcolax) 10 MG rectal suppository 10 mg  10 mg Rectal Daily PRN    dexmedeTOMIDine (Precedex) 800 mcg in sodium chloride 0.9% 100 mL infusion  0.2-1.5 mcg/kg/hr (Dosing Weight) Intravenous Continuous    acetaminophen (Ofirmev) 10 mg/mL infusion premix 1,000 mg  1,000 mg Intravenous Q6H PRN    cloNIDine (Catapres) 0.3 MG/24HR patch 1 patch  1 patch Transdermal Weekly    sodium chloride 3 % nebulizer solution 3 mL  3 mL Nebulization PRN    albuterol (Ventolin) (2.5 MG/3ML) 0.083% nebulizer solution 2.5 mg  2.5 mg Nebulization Q6H PRN    piperacillin-tazobactam (Zosyn) 3.375 g in dextrose 5% 100 mL IVPB-ADDV  3.375 g Intravenous Q8H    vancomycin (Vancocin) 1.75 g in sodium chloride 0.9% 500mL IVPB premix  12.5 mg/kg Intravenous Q24H    hydrALAzine (Apresoline) 20 mg/mL injection 10 mg  10 mg Intravenous Q4H PRN    carvedilol (Coreg) tab 37.5 mg  37.5 mg Oral BID with meals    acetaminophen (Tylenol) 160 MG/5ML oral liquid 650 mg  650 mg Oral Q6H PRN    aspirin chewable tab 81 mg  81 mg Peg Tube Daily    niCARdipine (carDENE) 125 mg in sodium chloride 0.9% 250 mL infusion  5-15 mg/hr Intravenous Continuous    isosorbide dinitrate (Isordil) tab 40 mg  40 mg Per NG Tube TID (Nitrates)    hydrALAZINE (Apresoline) tab 150 mg  150 mg Oral Q8H GABRIEL    amLODIPine (Norvasc) tab 10 mg  10 mg Oral Daily    ipratropium-albuterol (Duoneb) 0.5-2.5 (3) MG/3ML inhalation  solution 3 mL  3 mL Nebulization Q6H PRN    HYDROcodone-acetaminophen (Norco) 5-325 MG per tab 2 tablet  2 tablet Oral Q4H PRN    heparin (Porcine) 5000 UNIT/ML injection 5,000 Units  5,000 Units Subcutaneous Q8H GABRIEL    melatonin tab 3 mg  3 mg Oral Nightly PRN    potassium chloride 20 mEq/100mL IVPB premix 20 mEq  20 mEq Intravenous PRN    Or    potassium chloride 40 mEq/100mL IVPB premix (central line) 40 mEq  40 mEq Intravenous PRN    magnesium sulfate in dextrose 5% 1 g/100mL infusion premix 1 g  1 g Intravenous PRN    magnesium sulfate in sterile water for injection 2 g/50mL IVPB premix 2 g  2 g Intravenous PRN    chlorhexidine gluconate (Peridex) 0.12 % oral solution 15 mL  15 mL Mouth/Throat BID    famotidine (Pepcid) 20 mg/2mL injection 20 mg  20 mg Intravenous Daily     Medications Prior to Admission   Medication Sig    Acetaminophen ER (TYLENOL 8 HOUR) 650 MG Oral Tab CR Take 2 tablets (1,300 mg total) by mouth daily as needed.    Calcium Carb-Cholecalciferol 600-10 MG-MCG Oral Tab Take 1 tablet by mouth daily.    metoprolol succinate ER 50 MG Oral Tablet 24 Hr Take 1 tablet (50 mg total) by mouth daily.    Losartan Potassium-HCTZ 100-12.5 MG Oral Tab Take 1 tablet by mouth daily.    Potassium Chloride ER 20 MEQ Oral Tab CR Take 1 tablet by mouth daily.    albuterol 108 (90 Base) MCG/ACT Inhalation Aero Soln Inhale 2 puffs into the lungs every 4 (four) hours as needed for Wheezing.    amLODIPine 10 MG Oral Tab Take 1 tablet (10 mg total) by mouth daily.    Ascorbic Acid (VITAMIN C) 1000 MG Oral Tab Take 1 tablet (1,000 mg total) by mouth daily.    vitamin B-12 50 MCG Oral Tab Take 1 tablet (50 mcg total) by mouth daily.    fluticasone propionate 50 MCG/ACT Nasal Suspension 2 sprays by Nasal route daily.    fluticasone-salmeterol 250-50 MCG/ACT Inhalation Aerosol Powder, Breath Activated Inhale 1 puff into the lungs 2 (two) times daily. inhale 1 puff by INHALATION route 2 times every day morning and evening  approximately 12 hours apart (Patient not taking: Reported on 11/21/2024)       Allergies  Allergies[1]    Review of Systems:   As by Admitting/Attending    Results:   Laboratory Data:  Lab Results   Component Value Date    WBC 8.4 12/14/2024    HGB 8.0 (L) 12/14/2024    HCT 24.7 (L) 12/14/2024    .0 12/14/2024    CREATSERUM 1.28 (H) 12/14/2024    BUN 26 (H) 12/14/2024     (H) 12/14/2024    K 4.2 12/14/2024    K 4.2 12/14/2024     (H) 12/14/2024    CO2 22.0 12/14/2024     (H) 12/14/2024    CA 8.4 (L) 12/14/2024    ALB 3.3 12/13/2024    ALKPHO 73 12/13/2024    TP 5.8 12/13/2024    AST 15 12/13/2024    ALT 16 12/13/2024    PTT 30.7 12/04/2024    INR 1.17 12/04/2024    PTP 15.6 (H) 12/04/2024    TSH 2.085 12/07/2024    NATHALIE 99 11/25/2024    LIP 28 11/25/2024    DDIMER 0.42 12/08/2019    ESRML 15 06/05/2021    MG 2.2 12/14/2024    PHOS 3.0 12/14/2024    TROP <0.045 12/08/2019     (H) 09/23/2021    B12 1,156 (H) 07/23/2018         Imaging:  XR ABDOMEN (1 VIEW) (CPT=74018)    Result Date: 12/14/2024  CONCLUSION:  Significantly limited examination.  Ongoing dilation of small bowel loops up to 4.7 cm, previously 4.5 cm.  Recommend continued radiographic/clinical follow-up.    Dictated by (CST): Karen Ureña MD on 12/14/2024 at 11:02 AM     Finalized by (CST): Karen Ureña MD on 12/14/2024 at 11:04 AM          XR ABDOMEN (1 VIEW) (CPT=74018)    Result Date: 12/12/2024  CONCLUSION:  1. Nonspecific small bowel dilatation left mid abdomen and central abdomen measures up to 4.5 cm. 2. Mild stool scattered throughout the colon suggests constipation fecal retention.     Dictated by (CST): Woodrow Fischer MD on 12/12/2024 at 1:58 PM     Finalized by (CST): Woodrow Fischer MD on 12/12/2024 at 2:01 PM          XR CHEST AP PORTABLE  (CPT=71045)    Result Date: 12/12/2024  CONCLUSION:  1. Cardiomegaly.  Tortuous aorta. 2. Bilateral mixed multifocal airspace consolidation with slight  improved aeration left lung base. 3. Tracheostomy tube overlies the tracheal air column.  Right PICC line with the tip in the SVC.     Dictated by (CST): Woodrow Fischer MD on 12/12/2024 at 11:28 AM     Finalized by (CST): Woodrow Fischer MD on 12/12/2024 at 11:31 AM           Vital Signs:   Blood pressure 135/59, pulse 75, temperature 98 °F (36.7 °C), temperature source Temporal, resp. rate 18, height 65\", weight (!) 140.2 kg (309 lb 1.4 oz), SpO2 100%, not currently breastfeeding.    Mental Status Exam:   Appearance: Stated age female, in hospital gown, laying down in hospital bed.  Psychomotor: Episodes of restlessness and agitation.  Orientation: Alert and oriented to person. Patient confused  Gait: Not evaluated.  Attitude/Coorperation: limited cooperation due to sedation and confusion.  Behavior: episodes of restlessness and agitation.  Speech: Patient not communicating verbally today but nodding her head at time.  Mood: Patient has been difficulty expressing her emotion  Affect: Anxious and irritable affect.  Thought process: Confused, disorganized  Thought content: No reports of  suicidal or homicidal ideation.  Perceptions: Patient has been demonstrating response to internal stimuli.  Concentration: Grossly impaired  Memory: Grossly impaired  Intellect: Average.  Judgment and Insight: Questionable.     Impression:   Delirium due to another medical condition.  Episodic mood disorder  Agitation    Dissection of ascending aorta (HCC)    NAIMA (acute kidney injury) ([facility])    Stage 3 chronic kidney disease (HCC)    Acute respiratory failure with hypoxia ([facility])    Hypervolemia    Ileus ([facility])      Patient is a 59 year old -American, single female with past medical history of CKD, hypertension, high cholesterol who was admitted to the hospital for aortic dissection. The patient has been demonstrating alternation in mood and cognition with episodes of increased restlessness and agitation. According to  team, they are trying to wean patient of propofol and precedex.     12/13/2024: patient remains on precedex. Continues to have episodes of increased restlessness and agitation. Haldol given this morning.     12/14/2024: The patient has been suffering from severe anxiety, confusion, challenging medical condition and delirious episode caused more paranoia and agitation.  Presents is reasonable approach but stabilizing the mood with antipsychotic medication in the appropriate approach at this point.    Discussed risk and benefit, acknowledging the current symptom and severity.  At this point, I would recommend the following approach:     Focus on safety  Focus on education and support.  Focus on insight orientation helping the patient understand diagnosis and treatment plan.  Continue ativan 1 mg IV TID  Start utilizing Haldol 2 mg IV 3 times daily scheduled.  Utilize ativan 2 mg IV q 4 hours PRN for anxiety  Utilize haldol 2 mg IV q 6 hours PRN for agitation  Processed with patient at length, the initiation of the above psychotropic medications I advised the patient of the risks, benefits, alternatives and potential side effects. The patient consents to administration of the medications and understands the right to refuse medications at any time. The patient verbalized understanding.   Coordinate plan with team    Orders This Visit:  Orders Placed This Encounter   Procedures    Troponin I (High Sensitivity)    CBC With Differential With Platelet    Basic Metabolic Panel (8)    Hepatic Function Panel (7)    PTT, Activated    Prothrombin Time (PT)    Pro Beta Natriuretic Peptide    Open heart surgical profile    CBC, Platelet; No Differential    CBC With Differential With Platelet    Prothrombin Time (PT)    PTT, Activated    Fibrinogen Activity    Expanded Arterial Blood Gas    Expanded Arterial Blood Gas    Arterial blood gas    CBC, Platelet; No Differential    Basic Metabolic Panel (8)    Magnesium    Comp Metabolic  Panel (14)    Magnesium    Expanded Arterial Blood Gas    Arterial blood gas    CBC, Platelet; No Differential    Magnesium    Renal Function Panel    Urinalysis, Routine    Microalb/Creat Ratio, Random Urine    Comp Metabolic Panel (14)    Magnesium    CBC With Differential With Platelet    Phosphorus    CBC, Platelet; No Differential    Heparin Induced Platelet    Expanded Arterial Blood Gas    Magnesium    Renal Function Panel    Lipase    Amylase    Lactic Acid, Plasma    Basic Metabolic Panel (8)    Potassium    Lipid Panel    Basic Metabolic Panel (8)    CBC, Platelet; No Differential    CBC, Platelet; No Differential    Magnesium    Renal Function Panel    CBC, Platelet; No Differential    Triglycerides    Magnesium    CBC With Differential With Platelet    Renal Function Panel    Manual differential    CBC With Differential With Platelet    Procalcitonin    Manual differential    CBC With Differential With Platelet    Renal Function Panel    Magnesium    Manual differential    REDRAW RENAL    Magnesium    CBC With Differential With Platelet    Comp Metabolic Panel (14)    Iron And Tibc    Phosphorus    Manual differential    Expanded Arterial Blood Gas    Magnesium    CBC With Differential With Platelet    Renal Function Panel    PTT, Activated    Prothrombin Time (PT)    Manual differential    CBC, Platelet; No Differential    Comp Metabolic Panel (14)    Lipid Panel    Expanded Arterial Blood Gas    Basic Metabolic Panel (8)    CBC, Platelet; No Differential    Magnesium    Renal Function Panel    CBC With Differential With Platelet    Magnesium    Renal Function Panel    CBC With Differential With Platelet    Potassium    TSH W Reflex To Free T4    Magnesium    CBC With Differential With Platelet    Renal Function Panel    Scan slide    Magnesium    Phosphorus    CBC With Differential With Platelet    Comp Metabolic Panel (14)    REDRAW CMP (P)    Basic Metabolic Panel (8)    CBC, Platelet; No  Differential    Basic Metabolic Panel (8)    CBC, Platelet; No Differential    Basic Metabolic Panel (8)    REDRAW BMP    Potassium    Basic Metabolic Panel (8)    CBC With Differential With Platelet    Scan slide    Potassium    RBC Morphology Scan    Magnesium    Phosphorus    Basic Metabolic Panel (8)    Comp Metabolic Panel (14)    Magnesium    Phosphorus    Triglycerides    Triglycerides    Potassium    CBC, Platelet; No Differential    CBC, Platelet; No Differential    Potassium    Vancomycin Peak, Serum    CBC, Platelet; No Differential    Comp Metabolic Panel (14)    Hemoglobin & Hematocrit    Type and screen    ABORH (Blood Type)    Antibody Screen    ABORH Confirmation    Prepare platelets Once    Prepare fresh frozen plasma Once    Prepare RBC STAT    Prepare cryoprecipitate Once    Type and screen    Prepare RBC Once    ABORH (Blood Type)    Antibody Screen    Type and screen    Prepare RBC STAT    ABORH (Blood Type)    Antibody Screen    Type and screen    Prepare RBC STAT    ABORH (Blood Type)    Antibody Screen    Specimen to Pathology Tissue    Rapid SARS-CoV-2 by PCR    MRSA Screen by PCR    Sputum culture    Sputum culture       Meds This Visit:  Requested Prescriptions      No prescriptions requested or ordered in this encounter         Armen Dudley MD  12/14/2024    Note to Patient: The 21st Century Cures Act makes medical notes like these available to patients in the interest of transparency. However, be advised this is a medical document. It is intended as peer to peer communication. It is written in medical language and may contain abbreviations or verbiage that are unfamiliar. It may appear blunt or direct. Medical documents are intended to carry relevant information, facts as evident, and the clinical opinion of the practitioner. This note may have been transcribed using a voice dictation system. Voice recognition errors may occur. This should not be taken to alter the content or meaning  of this note.           [1]   Allergies  Allergen Reactions    Atorvastatin MYALGIA    Pravastatin MYALGIA    Rosuvastatin MYALGIA    Seasonal Runny nose

## 2024-12-15 NOTE — PROGRESS NOTES
12/15/24 0409   Vent Information   Vent Type AV   Vent Mode PRVC/AC   Settings   FiO2 (%) 30 %   Resp Rate (Set) 18   Vt (Set, mL) 550 mL   PEEP/CPAP (cm H2O) 5 cm H20   Humidification Heater   H2O Bag Level Filled   Heater Temperature 98.6 °F (37 °C)   Surgical Airway Portex 8 mm Cuffed   Placement Date/Time: 12/04/24 0826   Placed By: Surgeon  Brand: Portex  Airway size (mm): 8 mm  Style: Cuffed   Status Secured   Site Care Dressing applied;Dried;Cleansed     No acute changes overnight, pt. vitals and spO2 are stable. Suction is provided, B.S ausculted. Trach. Care is done. There were multiple obstruction warnings from the vent. had to change the vent. circuits and suction tubing. Changed water bag aswell. RT will continue to monitor.

## 2024-12-15 NOTE — PROGRESS NOTES
[facility]  part of [facility]    Nephrology Progress Note    Chief Complaint   Patient presents with    Chest Pain Angina       Subjective:   59 year old female, following for NAIMA on CKD 3a.     Once events overnight.  Urine output 2.7L/24 hours.     Review of Systems:   Review of Systems   Unable to perform ROS: Acuity of condition       Objective:   Temp:  [97.7 °F (36.5 °C)-99 °F (37.2 °C)] 99 °F (37.2 °C)  Pulse:  [73-82] 79  Resp:  [16-28] 20  BP: (126-149)/(52-64) 133/53  SpO2:  [100 %] 100 %  FiO2 (%):  [30 %] 30 %  SpO2: 100 %     Intake/Output Summary (Last 24 hours) at 12/15/2024 1159  Last data filed at 12/15/2024 0600  Gross per 24 hour   Intake 6447.8 ml   Output 3445 ml   Net 3002.8 ml     Wt Readings from Last 3 Encounters:   12/13/24 (!) 309 lb 1.4 oz (140.2 kg)   10/24/24 254 lb (115.2 kg)   05/17/24 249 lb (112.9 kg)     Physical Exam  Constitutional:       Appearance: She is obese.   Neck:      Comments: Pos trach  Cardiovascular:      Rate and Rhythm: Normal rate and regular rhythm.      Heart sounds: Normal heart sounds.   Pulmonary:      Effort: Pulmonary effort is normal.      Breath sounds: Normal breath sounds.   Musculoskeletal:      Comments: Pos anasarca   Skin:     General: Skin is warm and dry.   Neurological:      Comments: Not talking   Psychiatric:      Comments: Not talking         Medications:  Current Facility-Administered Medications   Medication Dose Route Frequency    adult 3 in 1 TPN   Intravenous Continuous TPN    adult 3 in 1 TPN   Intravenous Continuous TPN    furosemide (Lasix) 10 mg/mL injection 40 mg  40 mg Intravenous Daily    haloperidol lactate (Haldol) 5 MG/ML injection 2 mg  2 mg Intravenous TID    labetalol (Trandate) 400 mg in sodium chloride 0.9% 200 mL infusion  0.5-10 mg/min Intravenous Continuous    LORazepam (Ativan) 2 mg/mL injection 1 mg  1 mg Intravenous TID    LORazepam (Ativan) 2 mg/mL injection 2 mg  2 mg Intravenous Q4H PRN     haloperidol lactate (Haldol) 5 MG/ML injection 2 mg  2 mg Intravenous Q6H PRN    dextrose 10% infusion (TPN no rate)   Intravenous Continuous PRN    fentaNYL (Sublimaze) 50 mcg/mL injection 25 mcg  25 mcg Intravenous Q2H PRN    fentaNYL (Sublimaze) 50 mcg/mL injection 50 mcg  50 mcg Intravenous Q2H PRN    bisacodyl (Dulcolax) 10 MG rectal suppository 10 mg  10 mg Rectal Daily PRN    dexmedeTOMIDine (Precedex) 800 mcg in sodium chloride 0.9% 100 mL infusion  0.2-1.5 mcg/kg/hr (Dosing Weight) Intravenous Continuous    acetaminophen (Ofirmev) 10 mg/mL infusion premix 1,000 mg  1,000 mg Intravenous Q6H PRN    cloNIDine (Catapres) 0.3 MG/24HR patch 1 patch  1 patch Transdermal Weekly    sodium chloride 3 % nebulizer solution 3 mL  3 mL Nebulization PRN    albuterol (Ventolin) (2.5 MG/3ML) 0.083% nebulizer solution 2.5 mg  2.5 mg Nebulization Q6H PRN    piperacillin-tazobactam (Zosyn) 3.375 g in dextrose 5% 100 mL IVPB-ADDV  3.375 g Intravenous Q8H    vancomycin (Vancocin) 1.75 g in sodium chloride 0.9% 500mL IVPB premix  12.5 mg/kg Intravenous Q24H    hydrALAzine (Apresoline) 20 mg/mL injection 10 mg  10 mg Intravenous Q4H PRN    carvedilol (Coreg) tab 37.5 mg  37.5 mg Oral BID with meals    acetaminophen (Tylenol) 160 MG/5ML oral liquid 650 mg  650 mg Oral Q6H PRN    aspirin chewable tab 81 mg  81 mg Peg Tube Daily    niCARdipine (carDENE) 125 mg in sodium chloride 0.9% 250 mL infusion  5-15 mg/hr Intravenous Continuous    isosorbide dinitrate (Isordil) tab 40 mg  40 mg Per NG Tube TID (Nitrates)    hydrALAZINE (Apresoline) tab 150 mg  150 mg Oral Q8H GABRIEL    amLODIPine (Norvasc) tab 10 mg  10 mg Oral Daily    ipratropium-albuterol (Duoneb) 0.5-2.5 (3) MG/3ML inhalation solution 3 mL  3 mL Nebulization Q6H PRN    HYDROcodone-acetaminophen (Norco) 5-325 MG per tab 2 tablet  2 tablet Oral Q4H PRN    heparin (Porcine) 5000 UNIT/ML injection 5,000 Units  5,000 Units Subcutaneous Q8H GABRIEL    melatonin tab 3 mg  3 mg Oral  Nightly PRN    potassium chloride 20 mEq/100mL IVPB premix 20 mEq  20 mEq Intravenous PRN    Or    potassium chloride 40 mEq/100mL IVPB premix (central line) 40 mEq  40 mEq Intravenous PRN    magnesium sulfate in dextrose 5% 1 g/100mL infusion premix 1 g  1 g Intravenous PRN    magnesium sulfate in sterile water for injection 2 g/50mL IVPB premix 2 g  2 g Intravenous PRN    chlorhexidine gluconate (Peridex) 0.12 % oral solution 15 mL  15 mL Mouth/Throat BID    famotidine (Pepcid) 20 mg/2mL injection 20 mg  20 mg Intravenous Daily              Results:     Recent Labs   Lab 12/09/24  0609 12/10/24  0607 12/12/24  0443 12/13/24  0904 12/14/24  0631 12/14/24  1542 12/15/24  0445   RBC 3.18*   < > 2.95* 2.67* 2.73*  --  2.98*   HGB 9.0*   < > 8.3* 7.3* 7.4* 8.0* 8.0*   HCT 27.7*   < > 25.3* 23.0* 23.6* 24.7* 25.2*   MCV 87.1   < > 85.8 86.1 86.4  --  84.6   NEPRELIM 7.06  --  3.64  --   --   --   --    WBC 10.2   < > 6.5 6.5 8.4  --  7.5   .0   < > 275.0 240.0 254.0  --  216.0    < > = values in this interval not displayed.     Recent Labs   Lab 12/09/24  0609 12/09/24  0720 12/09/24  1453 12/13/24  0612 12/14/24  0631 12/15/24  0445   *  --    < > 151* 161* 160*  160*   BUN  --  37*   < > 27* 26* 27*  27*   CREATSERUM 1.51*  --    < > 1.28* 1.28* 1.24*  1.24*   CA 9.1  --    < > 8.4* 8.4* 8.5*  8.5*   ALB 3.7  --   --  3.3  --  3.2     --    < > 149* 148* 146*  146*   K  --  3.9   < > 3.4*  3.4* 4.2  4.2 4.6  4.6   *  --    < > 118* 117* 116*  116*   CO2 17.0*  --    < > 21.0 22.0 23.0  23.0   ALKPHO 101  --   --  73  --  68   AST  --  24  --  15  --  25   ALT 28 25  --  16  --  23   BILT 0.3  --   --  0.5  --  0.6   TP 6.7  --   --  5.8  --  5.7    < > = values in this interval not displayed.     PTT   Date Value Ref Range Status   12/04/2024 30.7 23.0 - 36.0 seconds Final     INR   Date Value Ref Range Status   12/04/2024 1.17 0.80 - 1.20 Final     Comment:     Therapeutic INR  range for patients on warfarin:  2.0-3.0 for most medical conditions and surgical prophylaxis   2.5-3.5 for mechanical heart valves and recurrent thromboembolism    Direct thrombin inhibitors (e.g. argatroban, bivalirudin), factor Xa inhibitors (e.g. apixaban, rivaroxaban), and conditions such as coagulation factor deficiency, vitamin K deficiency, and liver disease will prolong the prothrombin time/INR.    Unfractionated heparin and low molecular weight heparin do not affect the prothrombin time/INR up to a concentration of 1 IU/mL and 1.5 IU/mL, respectively.         No results for input(s): \"BNP\" in the last 168 hours.  Recent Labs   Lab 12/13/24  0612 12/14/24  0631 12/15/24  0445   MG 2.4 2.2 2.1   PHOS 2.6 3.0 4.0        Recent Labs   Lab 12/09/24  0609 12/12/24  0443 12/13/24  0612 12/14/24  0631 12/15/24  0445   PHOS 4.2   < > 2.6 3.0 4.0   ALB 3.7  --  3.3  --  3.2    < > = values in this interval not displayed.       XR CHEST AP PORTABLE  (CPT=71045)    Result Date: 12/15/2024  CONCLUSION:   Right greater than left patchy bilateral lung opacity which may be mildly increased in the right lung base.    Dictated by (CST): Javid Watts MD on 12/15/2024 at 7:23 AM     Finalized by (CST): Javid Watts MD on 12/15/2024 at 7:24 AM          XR ABDOMEN (1 VIEW) (CPT=74018)    Result Date: 12/14/2024  CONCLUSION:  Significantly limited examination.  Ongoing dilation of small bowel loops up to 4.7 cm, previously 4.5 cm.  Recommend continued radiographic/clinical follow-up.    Dictated by (CST): Karen rUeña MD on 12/14/2024 at 11:02 AM     Finalized by (CST): Karen Ureña MD on 12/14/2024 at 11:04 AM               Assessment and Plan:     NAIMA on CKD stage 3a:   NAIMA secondary to MARY LOU/ATN.   Cr 1.24 today, closer to baseline.   Monitor I/Os.     Volume overload.   Cont Furosemide 40 mg IV daily.     Hypernatremia:   Sodium improved to 146 today.   Cont TPN.     Hypertension:  On labetolol and  nicardipine drips.     Aortic dissections s/p emergent aortic dissection repair:   CT surgery following.     Respiratory failure:  S/p tracheostomy      Abdominal distention/ileus:S/p PEG 12/5  Currently tube feed on hold.       Antwan Laird MD  12/15/2024

## 2024-12-15 NOTE — PROGRESS NOTES
Patient is a 59 year old -American, single female with past medical history of CKD, hypertension, high cholesterol who was admitted to the hospital for aortic dissection. The patient has been demonstrating alternation in mood and cognition with episodes of increased restlessness and agitation. According to team, they are trying to wean patient of propofol and precedex. Patient indicated for psych consult for evaluation and advise.  Consult Duration     The patient seen for over 50-minute, follow-up evaluation, over 50% counseling and coordinating care addressing agitation, restlessness, medication management..    Record reviewed, communication with attending, communication with RN and patient seen face to face evaluation.  History of Present Illness:     According to the team the patient has been in the hospital today.  The patient is less agitated and less anxious.    The patient seen yesterday and the patient started on scheduled Haldol 2 mg every 8 hours scheduled addition to as needed.  Staff indicated that she had 1 bowel movement last night.  Patient indicated 1 dosage of fentanyl last night.    The patient seen today in the room and the patient presented alert and oriented to self and place.  Patient asking what her medical condition \"I do not know what is wrong with me\".      Patient admitted that she is feeling anxious.  Patient otherwise reporting limited pain.  Patient admitted feeling physically exhausted and lethargic.    The patient has been less sedated and her Precedex level has been decreased to 0.4.    The patient today reporting that she believes she slept okay.  Patient denied any auditory or visual hallucination.  Patient was not restrained and has not been demonstrating any agitation.  Patient on Haldol and Ativan as scheduled.    Labs and imaging reviewed: Glucose 151, sodium 149, potassium 4.3, Bun 27, creatinine 1.28     Provided patient with supportive psychotherapy including  emotional support and encouragement.    Otherwise the patient has been through severe anxiety mixed with confusion and irritability.  No agitation and no restraint    Past Medical History  Past Medical History:    Chronic kidney disease (CKD)    Esophageal reflux    High blood pressure    High cholesterol    HYPERTENSION    Hypertension    Unspecified essential hypertension       Past Surgical History  Past Surgical History:   Procedure Laterality Date    Colonoscopy N/A 2018    Procedure: COLONOSCOPY;  Surgeon: Magdy Webber MD;  Location: [facility] ENDOSCOPY    Endometrial ablation      child passed away for 5 mins          1    Other surgical history  11    cysto-Dr. Lacey -- pt denies       Family History  Family History   Problem Relation Age of Onset    Hypertension Mother     Heart Disorder Mother 70    Other (Other) Mother         kidney failure    Hypertension Father     Hypertension Maternal Grandfather     Cancer Neg     Diabetes Neg     Glaucoma Neg     Macular degeneration Neg        Social History  Social History     Socioeconomic History    Marital status: Single   Tobacco Use    Smoking status: Former     Current packs/day: 0.00     Average packs/day: 1 pack/day for 13.0 years (13.0 ttl pk-yrs)     Types: Cigarettes     Start date:      Quit date:      Years since quittin.9    Smokeless tobacco: Former   Vaping Use    Vaping status: Never Used   Substance and Sexual Activity    Alcohol use: Yes     Alcohol/week: 1.0 - 2.0 standard drink of alcohol     Types: 1 - 2 Cans of beer per week     Comment: every day    Drug use: No     Social Drivers of Health     Food Insecurity: Unknown (2024)    Food Insecurity     Food Insecurity: Patient unable to answer   Transportation Needs: Unknown (2024)    Transportation Needs     Lack of Transportation: Patient unable to answer   Housing Stability: Unknown (2024)    Housing Stability     Housing Instability:  Patient unable to answer           Current Medications:  Current Facility-Administered Medications   Medication Dose Route Frequency    adult 3 in 1 TPN   Intravenous Continuous TPN    adult 3 in 1 TPN   Intravenous Continuous TPN    furosemide (Lasix) 10 mg/mL injection 40 mg  40 mg Intravenous Daily    labetalol (Trandate) 400 mg in sodium chloride 0.9% 200 mL infusion  0.5-10 mg/min Intravenous Continuous    LORazepam (Ativan) 2 mg/mL injection 1 mg  1 mg Intravenous TID    LORazepam (Ativan) 2 mg/mL injection 2 mg  2 mg Intravenous Q4H PRN    haloperidol lactate (Haldol) 5 MG/ML injection 2 mg  2 mg Intravenous Q6H PRN    dextrose 10% infusion (TPN no rate)   Intravenous Continuous PRN    fentaNYL (Sublimaze) 50 mcg/mL injection 25 mcg  25 mcg Intravenous Q2H PRN    fentaNYL (Sublimaze) 50 mcg/mL injection 50 mcg  50 mcg Intravenous Q2H PRN    bisacodyl (Dulcolax) 10 MG rectal suppository 10 mg  10 mg Rectal Daily PRN    dexmedeTOMIDine (Precedex) 800 mcg in sodium chloride 0.9% 100 mL infusion  0.2-1.5 mcg/kg/hr (Dosing Weight) Intravenous Continuous    acetaminophen (Ofirmev) 10 mg/mL infusion premix 1,000 mg  1,000 mg Intravenous Q6H PRN    cloNIDine (Catapres) 0.3 MG/24HR patch 1 patch  1 patch Transdermal Weekly    sodium chloride 3 % nebulizer solution 3 mL  3 mL Nebulization PRN    albuterol (Ventolin) (2.5 MG/3ML) 0.083% nebulizer solution 2.5 mg  2.5 mg Nebulization Q6H PRN    piperacillin-tazobactam (Zosyn) 3.375 g in dextrose 5% 100 mL IVPB-ADDV  3.375 g Intravenous Q8H    hydrALAzine (Apresoline) 20 mg/mL injection 10 mg  10 mg Intravenous Q4H PRN    carvedilol (Coreg) tab 37.5 mg  37.5 mg Oral BID with meals    acetaminophen (Tylenol) 160 MG/5ML oral liquid 650 mg  650 mg Oral Q6H PRN    aspirin chewable tab 81 mg  81 mg Peg Tube Daily    niCARdipine (carDENE) 125 mg in sodium chloride 0.9% 250 mL infusion  5-15 mg/hr Intravenous Continuous    isosorbide dinitrate (Isordil) tab 40 mg  40 mg Per NG  Tube TID (Nitrates)    hydrALAZINE (Apresoline) tab 150 mg  150 mg Oral Q8H GABRIEL    amLODIPine (Norvasc) tab 10 mg  10 mg Oral Daily    ipratropium-albuterol (Duoneb) 0.5-2.5 (3) MG/3ML inhalation solution 3 mL  3 mL Nebulization Q6H PRN    HYDROcodone-acetaminophen (Norco) 5-325 MG per tab 2 tablet  2 tablet Oral Q4H PRN    heparin (Porcine) 5000 UNIT/ML injection 5,000 Units  5,000 Units Subcutaneous Q8H GABRIEL    melatonin tab 3 mg  3 mg Oral Nightly PRN    potassium chloride 20 mEq/100mL IVPB premix 20 mEq  20 mEq Intravenous PRN    Or    potassium chloride 40 mEq/100mL IVPB premix (central line) 40 mEq  40 mEq Intravenous PRN    magnesium sulfate in dextrose 5% 1 g/100mL infusion premix 1 g  1 g Intravenous PRN    magnesium sulfate in sterile water for injection 2 g/50mL IVPB premix 2 g  2 g Intravenous PRN    chlorhexidine gluconate (Peridex) 0.12 % oral solution 15 mL  15 mL Mouth/Throat BID    famotidine (Pepcid) 20 mg/2mL injection 20 mg  20 mg Intravenous Daily     Medications Prior to Admission   Medication Sig    Acetaminophen ER (TYLENOL 8 HOUR) 650 MG Oral Tab CR Take 2 tablets (1,300 mg total) by mouth daily as needed.    Calcium Carb-Cholecalciferol 600-10 MG-MCG Oral Tab Take 1 tablet by mouth daily.    metoprolol succinate ER 50 MG Oral Tablet 24 Hr Take 1 tablet (50 mg total) by mouth daily.    Losartan Potassium-HCTZ 100-12.5 MG Oral Tab Take 1 tablet by mouth daily.    Potassium Chloride ER 20 MEQ Oral Tab CR Take 1 tablet by mouth daily.    albuterol 108 (90 Base) MCG/ACT Inhalation Aero Soln Inhale 2 puffs into the lungs every 4 (four) hours as needed for Wheezing.    amLODIPine 10 MG Oral Tab Take 1 tablet (10 mg total) by mouth daily.    Ascorbic Acid (VITAMIN C) 1000 MG Oral Tab Take 1 tablet (1,000 mg total) by mouth daily.    vitamin B-12 50 MCG Oral Tab Take 1 tablet (50 mcg total) by mouth daily.    fluticasone propionate 50 MCG/ACT Nasal Suspension 2 sprays by Nasal route daily.     fluticasone-salmeterol 250-50 MCG/ACT Inhalation Aerosol Powder, Breath Activated Inhale 1 puff into the lungs 2 (two) times daily. inhale 1 puff by INHALATION route 2 times every day morning and evening approximately 12 hours apart (Patient not taking: Reported on 11/21/2024)       Allergies  Allergies[1]    Review of Systems:   As by Admitting/Attending    Results:   Laboratory Data:  Lab Results   Component Value Date    WBC 7.5 12/15/2024    HGB 8.0 (L) 12/15/2024    HCT 25.2 (L) 12/15/2024    .0 12/15/2024    CREATSERUM 1.24 (H) 12/15/2024    CREATSERUM 1.24 (H) 12/15/2024    BUN 27 (H) 12/15/2024    BUN 27 (H) 12/15/2024     (H) 12/15/2024     (H) 12/15/2024    K 4.6 12/15/2024    K 4.6 12/15/2024     (H) 12/15/2024     (H) 12/15/2024    CO2 23.0 12/15/2024    CO2 23.0 12/15/2024     (H) 12/15/2024     (H) 12/15/2024    CA 8.5 (L) 12/15/2024    CA 8.5 (L) 12/15/2024    ALB 3.2 12/15/2024    ALKPHO 68 12/15/2024    TP 5.7 12/15/2024    AST 25 12/15/2024    ALT 23 12/15/2024    PTT 30.7 12/04/2024    INR 1.17 12/04/2024    PTP 15.6 (H) 12/04/2024    TSH 2.085 12/07/2024    NATHALIE 99 11/25/2024    LIP 28 11/25/2024    DDIMER 0.42 12/08/2019    ESRML 15 06/05/2021    MG 2.1 12/15/2024    PHOS 4.0 12/15/2024    TROP <0.045 12/08/2019     (H) 09/23/2021    B12 1,156 (H) 07/23/2018         Imaging:  XR CHEST AP PORTABLE  (CPT=71045)    Result Date: 12/15/2024  CONCLUSION:   Right greater than left patchy bilateral lung opacity which may be mildly increased in the right lung base.    Dictated by (CST): Javid Watts MD on 12/15/2024 at 7:23 AM     Finalized by (CST): Javid Watts MD on 12/15/2024 at 7:24 AM          XR ABDOMEN (1 VIEW) (CPT=74018)    Result Date: 12/14/2024  CONCLUSION:  Significantly limited examination.  Ongoing dilation of small bowel loops up to 4.7 cm, previously 4.5 cm.  Recommend continued radiographic/clinical follow-up.    Dictated by  (CST): Karen Ureña MD on 12/14/2024 at 11:02 AM     Finalized by (CST): Karen Ureña MD on 12/14/2024 at 11:04 AM           Vital Signs:   Blood pressure 125/54, pulse 75, temperature 97.9 °F (36.6 °C), temperature source Oral, resp. rate 18, height 65\", weight (!) 140.2 kg (309 lb 1.4 oz), SpO2 100%, not currently breastfeeding.    Mental Status Exam:   Appearance: Stated age female, in hospital gown, laying down in hospital bed.  Psychomotor: Episode of confusion and restlessness but mostly lethargic and slow psychomotor.  Orientation: Alert and oriented to person and hospital. Patient confused  Gait: Not evaluated.  Attitude/Coorperation: limited cooperation due to some confusion and tiredness.  Behavior: No episode of agitation reported.  Patient sleeping most of the time.  Speech: Patient spoke with some dysarthric speech but understandable.  Mood: Patient has been difficulty expressing her emotion  Affect: Anxious and irritable affect.  Thought process: Confused, disorganized  Thought content: No reports of  suicidal or homicidal ideation.  Perceptions: Patient denied any auditory or visual hallucination.  Concentration: Grossly impaired  Memory: Grossly impaired  Intellect: Average.  Judgment and Insight: Questionable.     Impression:   Delirium due to another medical condition.  Episodic mood disorder  Agitation    Dissection of ascending aorta (HCC)    NAIMA (acute kidney injury) (HCC)    Stage 3 chronic kidney disease (HCC)    Acute respiratory failure with hypoxia (HCC)    Hypervolemia    Ileus (HCC)      Patient is a 59 year old -American, single female with past medical history of CKD, hypertension, high cholesterol who was admitted to the hospital for aortic dissection. The patient has been demonstrating alternation in mood and cognition with episodes of increased restlessness and agitation. According to team, they are trying to wean patient of propofol and precedex.     12/13/2024:  patient remains on precedex. Continues to have episodes of increased restlessness and agitation. Haldol given this morning.     12/14/2024: The patient has been suffering from severe anxiety, confusion, challenging medical condition and delirious episode caused more paranoia and agitation.  Presents is reasonable approach but stabilizing the mood with antipsychotic medication in the appropriate approach at this point.    12/15/2024: The patient has been demonstrating improvement to the addition of Haldol with decrease in the Precedex.    Discussed risk and benefit, acknowledging the current symptom and severity.  At this point, I would recommend the following approach:     Focus on safety  Focus on education and support.  Focus on insight orientation helping the patient understand diagnosis and treatment plan.  Lower Ativan to 0.5 mg IV every 6 hours scheduled.  Continue Haldol 2 mg IV 3 times daily scheduled.  Utilize ativan 1 mg IV q 4 hours PRN for anxiety  Utilize haldol 2 mg IV q 6 hours PRN for agitation  Processed with patient at length, the initiation of the above psychotropic medications I advised the patient of the risks, benefits, alternatives and potential side effects. The patient consents to administration of the medications and understands the right to refuse medications at any time. The patient verbalized understanding.   Coordinate plan with team    Orders This Visit:  Orders Placed This Encounter   Procedures    Troponin I (High Sensitivity)    CBC With Differential With Platelet    Basic Metabolic Panel (8)    Hepatic Function Panel (7)    PTT, Activated    Prothrombin Time (PT)    Pro Beta Natriuretic Peptide    Open heart surgical profile    CBC, Platelet; No Differential    CBC With Differential With Platelet    Prothrombin Time (PT)    PTT, Activated    Fibrinogen Activity    Expanded Arterial Blood Gas    Expanded Arterial Blood Gas    Arterial blood gas    CBC, Platelet; No Differential     Basic Metabolic Panel (8)    Magnesium    Comp Metabolic Panel (14)    Magnesium    Expanded Arterial Blood Gas    Arterial blood gas    CBC, Platelet; No Differential    Magnesium    Renal Function Panel    Urinalysis, Routine    Microalb/Creat Ratio, Random Urine    Comp Metabolic Panel (14)    Magnesium    CBC With Differential With Platelet    Phosphorus    CBC, Platelet; No Differential    Heparin Induced Platelet    Expanded Arterial Blood Gas    Magnesium    Renal Function Panel    Lipase    Amylase    Lactic Acid, Plasma    Basic Metabolic Panel (8)    Potassium    Lipid Panel    Basic Metabolic Panel (8)    CBC, Platelet; No Differential    CBC, Platelet; No Differential    Magnesium    Renal Function Panel    CBC, Platelet; No Differential    Triglycerides    Magnesium    CBC With Differential With Platelet    Renal Function Panel    Manual differential    CBC With Differential With Platelet    Procalcitonin    Manual differential    CBC With Differential With Platelet    Renal Function Panel    Magnesium    Manual differential    REDRAW RENAL    Magnesium    CBC With Differential With Platelet    Comp Metabolic Panel (14)    Iron And Tibc    Phosphorus    Manual differential    Expanded Arterial Blood Gas    Magnesium    CBC With Differential With Platelet    Renal Function Panel    PTT, Activated    Prothrombin Time (PT)    Manual differential    CBC, Platelet; No Differential    Comp Metabolic Panel (14)    Lipid Panel    Expanded Arterial Blood Gas    Basic Metabolic Panel (8)    CBC, Platelet; No Differential    Magnesium    Renal Function Panel    CBC With Differential With Platelet    Magnesium    Renal Function Panel    CBC With Differential With Platelet    Potassium    TSH W Reflex To Free T4    Magnesium    CBC With Differential With Platelet    Renal Function Panel    Scan slide    Magnesium    Phosphorus    CBC With Differential With Platelet    Comp Metabolic Panel (14)    REDRAW CMP (P)     Basic Metabolic Panel (8)    CBC, Platelet; No Differential    Basic Metabolic Panel (8)    CBC, Platelet; No Differential    Basic Metabolic Panel (8)    REDRAW BMP    Potassium    Basic Metabolic Panel (8)    CBC With Differential With Platelet    Scan slide    Potassium    RBC Morphology Scan    Magnesium    Phosphorus    Basic Metabolic Panel (8)    Comp Metabolic Panel (14)    Magnesium    Phosphorus    Triglycerides    Triglycerides    Potassium    CBC, Platelet; No Differential    CBC, Platelet; No Differential    Potassium    Vancomycin Peak, Serum    CBC, Platelet; No Differential    Comp Metabolic Panel (14)    Hemoglobin & Hematocrit    CBC, Platelet; No Differential    Basic Metabolic Panel (8)    Magnesium    Phosphorus    Type and screen    ABORH (Blood Type)    Antibody Screen    ABORH Confirmation    Prepare platelets Once    Prepare fresh frozen plasma Once    Prepare RBC STAT    Prepare cryoprecipitate Once    Type and screen    Prepare RBC Once    ABORH (Blood Type)    Antibody Screen    Type and screen    Prepare RBC STAT    ABORH (Blood Type)    Antibody Screen    Type and screen    Prepare RBC STAT    ABORH (Blood Type)    Antibody Screen    Specimen to Pathology Tissue    Rapid SARS-CoV-2 by PCR    MRSA Screen by PCR    Sputum culture    Sputum culture       Meds This Visit:  Requested Prescriptions      No prescriptions requested or ordered in this encounter         Armen Dudley MD  12/15/2024    Note to Patient: The 21st Century Cures Act makes medical notes like these available to patients in the interest of transparency. However, be advised this is a medical document. It is intended as peer to peer communication. It is written in medical language and may contain abbreviations or verbiage that are unfamiliar. It may appear blunt or direct. Medical documents are intended to carry relevant information, facts as evident, and the clinical opinion of the practitioner. This note may have been  transcribed using a voice dictation system. Voice recognition errors may occur. This should not be taken to alter the content or meaning of this note.           [1]   Allergies  Allergen Reactions    Atorvastatin MYALGIA    Pravastatin MYALGIA    Rosuvastatin MYALGIA    Seasonal Runny nose

## 2024-12-15 NOTE — PROGRESS NOTES
[facility]     Gastroenterology Progress Note    Alisha Wilde Patient Status:  Inpatient    10/25/1965 MRN [id number]   Location [facility] 2W/SW Attending Missy Paulson MD   Hosp Day # 23 PCP Bridger Avendano MD       Subjective:   D/w RN output from PEG scant bilious  Had a large brown bm today  She denies abdominal pain  Objective:   Blood pressure 127/55, pulse 77, temperature 97.9 °F (36.6 °C), temperature source Oral, resp. rate 18, height 5' 5\" (1.651 m), weight (!) 309 lb 1.4 oz (140.2 kg), SpO2 100%, not currently breastfeeding. Body mass index is 51.43 kg/m².    Gen:awake, no acute distress  HEENT: mucus membranes appear moist, trach to vent  CV: RRR  Lung: mechanical breath sounds  Abdomen: soft NT, seems less distended today, peg luq  Skin: dry, warm, no jaundice  Ext: +edema  Neuro: appropriate    Assessment and Plan:   Alisha Wilde is a 59 year old female w/ PMHx of hypertension, GERD, CKD, hyperlipidemia who presented to ER 2024 for chest pain. Underwent emergent repair of aortic type A dissection on 2024. Inability to wean from the ventillator now s/p trach and PEG placement -.  GI reconsulted for high residual TF.    Had a bowel movement this morning which is reassuring     # Prolonged sb ileus  -s/p PEG   -CT abdomen pelvis with dilated fluid-filled loops of small bowel, no transition point  -output from PEG appears to have slowed down   -continue to hold tube feeds  -remains on TPN  -Avoid dysmotility agents  -appreciate surgery input    D/w nursing    Toma Barrientos MD      Results:     Lab Results   Component Value Date    WBC 7.5 12/15/2024    HGB 8.0 (L) 12/15/2024    HCT 25.2 (L) 12/15/2024    .0 12/15/2024    CREATSERUM 1.24 (H) 12/15/2024    CREATSERUM 1.24 (H) 12/15/2024    BUN 27 (H) 12/15/2024    BUN 27 (H) 12/15/2024     (H) 12/15/2024     (H) 12/15/2024    K 4.6 12/15/2024    K 4.6 12/15/2024     (H) 12/15/2024      (H) 12/15/2024    CO2 23.0 12/15/2024    CO2 23.0 12/15/2024     (H) 12/15/2024     (H) 12/15/2024    CA 8.5 (L) 12/15/2024    CA 8.5 (L) 12/15/2024    ALB 3.2 12/15/2024    ALKPHO 68 12/15/2024    BILT 0.6 12/15/2024    TP 5.7 12/15/2024    AST 25 12/15/2024    ALT 23 12/15/2024    PTT 30.7 12/04/2024    INR 1.17 12/04/2024    TSH 2.085 12/07/2024    NATHALIE 99 11/25/2024    LIP 28 11/25/2024    DDIMER 0.42 12/08/2019    ESRML 15 06/05/2021    MG 2.1 12/15/2024    PHOS 4.0 12/15/2024    TROP <0.045 12/08/2019     (H) 09/23/2021    B12 1,156 (H) 07/23/2018       XR CHEST AP PORTABLE  (CPT=71045)    Result Date: 12/15/2024  CONCLUSION:   Right greater than left patchy bilateral lung opacity which may be mildly increased in the right lung base.    Dictated by (CST): Javid Watts MD on 12/15/2024 at 7:23 AM     Finalized by (CST): Javid Watts MD on 12/15/2024 at 7:24 AM          XR ABDOMEN (1 VIEW) (CPT=74018)    Result Date: 12/14/2024  CONCLUSION:  Significantly limited examination.  Ongoing dilation of small bowel loops up to 4.7 cm, previously 4.5 cm.  Recommend continued radiographic/clinical follow-up.    Dictated by (CST): Karen Ureña MD on 12/14/2024 at 11:02 AM     Finalized by (CST): Karen Ureña MD on 12/14/2024 at 11:04 AM

## 2024-12-15 NOTE — PLAN OF CARE
Problem: Diabetes/Glucose Control  Goal: Glucose maintained within prescribed range  Description: INTERVENTIONS:  - Monitor Blood Glucose as ordered  - Assess for signs and symptoms of hyperglycemia and hypoglycemia  - Administer ordered medications to maintain glucose within target range  - Assess barriers to adequate nutritional intake and initiate nutrition consult as needed  - Instruct patient on self management of diabetes  Outcome: Progressing     Problem: METABOLIC/FLUID AND ELECTROLYTES - ADULT  Goal: Glucose maintained within prescribed range  Description: INTERVENTIONS:  - Monitor Blood Glucose as ordered  - Assess for signs and symptoms of hyperglycemia and hypoglycemia  - Administer ordered medications to maintain glucose within target range  - Assess barriers to adequate nutritional intake and initiate nutrition consult as needed  - Instruct patient on self management of diabetes  Outcome: Progressing     Problem: SKIN/TISSUE INTEGRITY - ADULT  Goal: Oral mucous membranes remain intact  Description: INTERVENTIONS  - Assess oral mucosa and hygiene practices  - Implement preventative oral hygiene regimen  - Implement oral medicated treatments as ordered  Outcome: Progressing     Problem: GASTROINTESTINAL - ADULT  Goal: Minimal or absence of nausea and vomiting  Description: INTERVENTIONS:  - Maintain adequate hydration with IV or PO as ordered and tolerated  - Nasogastric tube to low intermittent suction as ordered  - Evaluate effectiveness of ordered antiemetic medications  - Provide nonpharmacologic comfort measures as appropriate  - Advance diet as tolerated, if ordered  - Obtain nutritional consult as needed  - Evaluate fluid balance  Outcome: Not Progressing  Goal: Maintains or returns to baseline bowel function  Description: INTERVENTIONS:  - Assess bowel function  - Maintain adequate hydration with IV or PO as ordered and tolerated  - Evaluate effectiveness of GI medications  - Encourage  mobilization and activity  - Obtain nutritional consult as needed  - Establish a toileting routine/schedule  - Consider collaborating with pharmacy to review patient's medication profile  Outcome: Not Progressing

## 2024-12-15 NOTE — PROGRESS NOTES
Progress Note     Alisha Wilde Patient Status:  Inpatient    10/25/1965 MRN [id number]   Location [facility] 2W/SW Attending Radha Tidwell MD   Hosp Day # 23 PCP Bridger Avendano MD     Chief Complaint: patient presented with   Chief Complaint   Patient presents with    Chest Pain Angina       Subjective:   S: : trach, plan for possible NG tube by GI , PEG with stool therefore to LIS, weaning sedation   : pt is off propofol, on Precedex, continue to have ileus   : pt precedex is weaning, following simple commands   : pt had repeat KUB this am. Continue to have ileus, on IV drips for BP maintenance   12/15: had one BM yesterday, PEG tube drainage improved     Review of Systems:   10 point ROS completed and was negative, except for pertinent positive and negatives stated in subjective.    Objective:   Vital signs:  Temp:  [97.7 °F (36.5 °C)-99 °F (37.2 °C)] 99 °F (37.2 °C)  Pulse:  [73-82] 75  Resp:  [16-28] 23  BP: (126-149)/(52-64) 126/55  SpO2:  [99 %-100 %] 100 %  FiO2 (%):  [30 %] 30 %    Wt Readings from Last 6 Encounters:   24 (!) 309 lb 1.4 oz (140.2 kg)   10/24/24 254 lb (115.2 kg)   24 249 lb (112.9 kg)   24 249 lb (112.9 kg)   23 249 lb (112.9 kg)   10/09/23 248 lb (112.5 kg)         Physical Exam:    General: No acute distress. , Tracheostomy in place,     , morbidly obese  Respiratory: Clear to auscultation bilaterally. No wheezes. No rhonchi.  Cardiovascular: S1, S2. Regular rate and rhythm. No murmurs, rubs or gallops.   Abdomen: Soft, nontender, nondistended.  Positive bowel sounds. No rebound or guarding.  Neurologic: No focal neurological deficits.   Musculoskeletal: Moves all extremities.  Extremities: No edema.    Results:   Diagnostic Data:      Labs:    Labs Last 24 Hours:   BMP     CBC    Other     Na 146; 146 Cl 116; 116 BUN 27; 27 Glu 160; 160   Hb 8.0   PTT - Procal -   K 4.6; 4.6 CO2 23.0; 23.0 Cr 1.24; 1.24   WBC 7.5 >< .0  INR  - CRP -   Renal Lytes Endo    Hct 25.2   Trop - D dim -   eGFR - Ca 8.5; 8.5 POC Gluc  157    LFT   pBNP - Lactic -   eGFR AA - PO4 4.0 A1c -   AST 25 APk 68 Prot 5.7  LDL -     Mg 2.1 TSH -   ALT 23 T miranda 0.6 Alb 3.2        COVID-19 Lab Results    COVID-19  Lab Results   Component Value Date    COVID19 Not Detected 11/21/2024    COVID19 Not Detected 08/10/2023    COVID19 Not Detected 05/13/2022       Pro-Calcitonin  No results for input(s): \"PCT\" in the last 168 hours.    Cardiac  No results for input(s): \"TROP\", \"PBNP\" in the last 168 hours.    Creatinine Kinase  No results for input(s): \"CK\" in the last 168 hours.    Inflammatory Markers  No results for input(s): \"CRP\", \"NATALIA\", \"LDH\", \"DDIMER\" in the last 168 hours.    Imaging: Imaging data reviewed in Epic.    IMAGES:   CT A/P 12/10/24  CONCLUSION:   1. Fluid-filled loops of small bowel, which could reflect underlying infectious/inflammatory enteritis or malabsorption in the appropriate clinical setting. Bowel loops are mildly dilated without clear transition point to suggest mechanical bowel   obstruction, although early or partial bowel obstruction could have a similar appearance.   2. Extensive distal colonic diverticulosis without CT evidence of acute complication.    3. Bladder distension despite Webb catheter placement.   4. Hepatomegaly with hepatic steatosis.   5. Diffuse anasarca.   6. Partially visualized postthoracotomy chest with possible postoperative seroma/hematoma of the anterior chest wall.   7. Bilateral pleural effusions and associated basilar atelectasis, with or without superimposed pneumonia.   8. Additional scattered patchy nodular opacities may reflect infectious process.    9. Low-density appearance of the intracardiac blood pool raises the possibility of underlying anemia. Correlate with hematologic parameters.   10. Lesser incidental findings as above.      CXR 12/9/24  CONCLUSION:   1. Cardiomegaly.  Atherosclerotic aneurysmal  thoracic aorta   2. Tracheostomy tube overlies tracheal air column.   3.  Bilateral mixed alveolar and interstitial multifocal airspace opacification has progressed.        KUB 12/9/24  CONCLUSION:   No huang dilatation of the small bowel to suggest small bowel obstruction.   Large cecal stool burden which may indicate constipation.  Mild stool burden throughout the remainder of the colon.      CXR 12/5/24  No significant change when compared to 12/04/2024.      CXR 12/4/24  CONCLUSION:   1. Mild cardiomegaly and minimally prominent pulmonary vascularity but no huang pulmonary edema.  ET tube and NG tubes have been removed.  Tracheostomy is now noted in the trachea extending to the level the clavicles.  No pneumothorax.   2. Persistent patchy bilateral upper lobe airspace disease right more than left suggesting persistent bilateral upper lobe pneumonia.  Correlate clinically and continued follow-up is advised.      CXR 12/2/24  FINDINGS:   POSITION: The patient is semi-erect and slightly rotated to the right.   DEVICES: There is an endotracheal tube terminating approximately 3.9 cm above the jennyfer.  A large-bore right internal jugular central venous vascular access sheath has tip projecting in the SVC. An enteric tube extends caudally beyond the field of view.   CARDIAC/VASC: The cardiomediastinal silhouette is accentuated by AP technique, but likely stably enlarged. There is mild tortuosity of the thoracic aorta with peripheral atherosclerotic vascular calcification. The pulmonary vascularity is within normal   limits.   MEDIAST/HAMLET: Surgical clips are present.   LUNGS/PLEURA: Elevation right hemidiaphragm is noted. Multifocal patchy airspace consolidation is demonstrated, slightly worse on the right side than left. Mild interstitial opacities are seen. Additional scattered reticular opacities may be atelectatic   in nature. No large pleural effusion or pneumothorax is evident.    BONES: Median sternotomy wires  are demonstrated. Mild degenerative changes of the thoracic spine are apparent. There is no fracture or visible bony lesion.   OTHER: There may be surgical clips at the level of the thoracic inlet. Leads overlie the chest and obscure underlying detail      CXR 11/27/24  Moderate pulmonary edema, progressed since 11/26/2024.      CT c/a/p  CONCLUSION:  Low-lying ETT, 0.8 cm above the jennyfer   Multifocal bilateral pulmonary nodular consolidation s     CXR 11/24/24  FINDINGS:   CARDIAC/VASC: The cardiac silhouette is exaggerated by AP portable technique. There is stable central pulmonary venous congestion.   MEDIAST/HAMLET:   No visible mass or adenopathy.   LUNGS/PLEURA: There are stable streaky opacities at the right lung base.  No pleural effusion or pneumothorax.   BONES: Sternotomy changes are again noted.   OTHER: An endotracheal tube tip projects 3.8 cm above the jennyfer.  An enteric tube again courses into the left upper quadrant.  A right internal jugular approach Empire-Dev catheter tip again projects over the pulmonary outflow tract.  A mediastinal drain tip again projects over the right suprahilar region.      CXR 11/23/24  On my read possible RML infiltrates  CONCLUSION: Post emergent acute type A aortic dissection repair.  Stable cardiomegaly.  Gross stable/satisfactory position of lines and tubes.  Mild pulmonary vascular congestion.       CXR 11/22/24  CONCLUSION: Post feeding tube placement with tip in the distal esophagus approximately 4 cm above the gastroesophageal junction.  Recommend advancement of the tube by approximately 10 cm to place it in the stomach.      CXR 11/22/24  CARDIAC/MEDIASTINUM: The cardiac silhouette is enlarged and unchanged.. There is a right internal jugular Empire-Dev catheter with tip at the level of the main pulmonary artery. There is a right-sided chest tube. Endotracheal tube with tip approximately    5.3 cm above the jennyfer. There is a nasogastric tube with tip and sidehole  within the stomach and below the diaphragm. There are median sternotomy wires and postoperative changes of ascending aortic repair.   LUNGS: There is pulmonary vascular redistribution without edema. Minimal blunting of the bilateral costophrenic angles may be secondary to trace pleural effusions versus chronic pleural thickening. There is mild bibasilar atelectasis. No pneumothorax.   BONES: There is degenerative disease of the thoracic spine.      Chest CTA 11/22/24  CONCLUSION:   1. Type a aortic dissection beginning just above right renal (correction:  Right coronary)  artery and extending distally into the left common iliac artery and external iliac.  Findings were called to Dr. Echavarria at 5:52 p.m.    KUB 12/15  CONCLUSION:   Significantly limited examination.      Ongoing dilation of small bowel loops up to 4.7 cm, previously 4.5 cm.  Recommend continued radiographic/clinical follow-up.     Medications:    furosemide  40 mg Intravenous Daily    haloperidol lactate  2 mg Intravenous TID    LORazepam  1 mg Intravenous TID    cloNIDine  1 patch Transdermal Weekly    piperacillin-tazobactam  3.375 g Intravenous Q8H    vancomycin  12.5 mg/kg Intravenous Q24H    carvedilol  37.5 mg Oral BID with meals    aspirin  81 mg Peg Tube Daily    isosorbide dinitrate  40 mg Per NG Tube TID (Nitrates)    hydrALAZINE  150 mg Oral Q8H GABRIEL    amLODIPine  10 mg Oral Daily    heparin  5,000 Units Subcutaneous Q8H GABRIEL    chlorhexidine gluconate  15 mL Mouth/Throat BID    famotidine  20 mg Intravenous Daily       Assessment & Plan:   ASSESSMENT / PLAN:     Problem List Items Addressed This Visit          HCC Problems    Dissection of ascending aorta (HCC) - Primary    Relevant Medications    sodium chloride 0.9% infusion 1,000 mL (Completed)    prothrombin complex conc (human) (Kcentra) 1,563 Units in 60 mL infusion (Completed)    furosemide (Lasix) 10 mg/mL injection 20 mg (Completed)    heparin (Porcine) 5000 UNIT/ML injection 5,000  Units    amLODIPine (Norvasc) tab 10 mg    amLODIPine (Norvasc) tab 5 mg (Completed)    sodium chloride 0.9% infusion (Completed)    hydrALAZINE (Apresoline) tab 50 mg (Completed)    furosemide (Lasix) 10 mg/mL injection 40 mg (Completed)    labetalol (Trandate) 5 mg/mL injection (Completed)    hydrALAZINE (Apresoline) tab 150 mg    isosorbide dinitrate (Isordil) tab 40 mg    sodium chloride 0.9% infusion (Completed)    furosemide (Lasix) 10 mg/mL injection 40 mg (Completed)    niCARdipine (carDENE) 125 mg in sodium chloride 0.9% 250 mL infusion    furosemide (Lasix) 10 mg/mL injection 40 mg (Completed)    cloNIDine (Catapres) tab 0.3 mg (Completed)    cloNIDine (Catapres) tab 0.3 mg (Completed)    aspirin chewable tab 81 mg    carvedilol (Coreg) tab 37.5 mg    hydrALAzine (Apresoline) 20 mg/mL injection 10 mg    dexmedeTOMIDine (Precedex) 800 mcg in sodium chloride 0.9% 100 mL infusion    furosemide (Lasix) 10 mg/mL injection 40 mg (Completed)    cloNIDine (Catapres) 0.3 MG/24HR patch 1 patch    furosemide (Lasix) 10 mg/mL injection 40 mg (Completed)    furosemide (Lasix) 10 mg/mL injection 40 mg (Completed)    labetalol (Trandate) 400 mg in sodium chloride 0.9% 200 mL infusion    sodium chloride 0.9% infusion (Completed)    furosemide (Lasix) 10 mg/mL injection 40 mg     Other Visit Diagnoses       Aortic anomaly (HCC)        Relevant Medications    atropine 0.1 MG/ML injection 1 mg (Completed)    magnesium sulfate in dextrose 5% 1 g/100mL infusion premix 1 g    magnesium sulfate in sterile water for injection 2 g/50mL IVPB premix 2 g    furosemide (Lasix) 10 mg/mL injection 20 mg (Completed)    heparin (Porcine) 5000 UNIT/ML injection 5,000 Units    amLODIPine (Norvasc) tab 10 mg    amLODIPine (Norvasc) tab 5 mg (Completed)    hydrALAZINE (Apresoline) tab 50 mg (Completed)    furosemide (Lasix) 10 mg/mL injection 40 mg (Completed)    labetalol (Trandate) 5 mg/mL injection (Completed)    hydrALAZINE (Apresoline)  tab 150 mg    lidocaine PF (Xylocaine-MPF) 1% injection (Completed)    isosorbide dinitrate (Isordil) tab 40 mg    furosemide (Lasix) 10 mg/mL injection 40 mg (Completed)    niCARdipine (carDENE) 125 mg in sodium chloride 0.9% 250 mL infusion    ceFAZolin (Ancef) 3 g in sodium chloride 0.9% 100mL IVPB premix (Completed)    furosemide (Lasix) 10 mg/mL injection 40 mg (Completed)    cloNIDine (Catapres) tab 0.3 mg (Completed)    cloNIDine (Catapres) tab 0.3 mg (Completed)    aspirin chewable tab 81 mg    carvedilol (Coreg) tab 37.5 mg    vancomycin (Vancocin) 1.75 g in sodium chloride 0.9% 500mL IVPB premix    hydrALAzine (Apresoline) 20 mg/mL injection 10 mg    furosemide (Lasix) 10 mg/mL injection 40 mg (Completed)    lidocaine (cardiac) (Xylocaine) 20 mg/mL injection (Completed)    atropine 0.1 MG/ML injection (Completed)    EPINEPHrine (Adrenalin) 1 MG/10ML (0.1 MG/ML) injection (Cardiac Arrest) (Completed)    cloNIDine (Catapres) 0.3 MG/24HR patch 1 patch    furosemide (Lasix) 10 mg/mL injection 40 mg (Completed)    furosemide (Lasix) 10 mg/mL injection 40 mg (Completed)    labetalol (Trandate) 400 mg in sodium chloride 0.9% 200 mL infusion    adult 3 in 1 TPN    furosemide (Lasix) 10 mg/mL injection 40 mg    Other Relevant Orders    Specimen to Pathology Tissue (Completed)            58 y/o Morbid obesity, hypertension, gastroesophageal reflux disease, chronic kidney disease stage 2 to 3, and hyperlipidemia admitted on 11/21 for Chest pain -CT CT angiogram of the chest, abdomen, and pelvis rule out dissection showed type A aortic dissection s/p emergent repair 11/22/24 , transferred to ICU post op intubated on 11/22, failed SBT, s/p trach 12/4,  PEG 12/5, completed 10 days of abx zsecondary to aspiration event , no with ileus and Abx Vanc and Zosyn resumed for aspiration pneumonia           Ileus   -Gen surgery and GI following   -rpt Abd xray results pending   -PEG tube to LIS   -possibly will need NG    -Continue IV abx   12/13-PEG tube drainage improved   12/14: Repeat KUB this am   12/15: KUB yesterday with on going  Small bowel Dilation         Type A aortic dissection   Ileus   S/p emergent repair 11/22/24   CT chest abdomen and pelvis shows bilateral pulmonary nodular consolidation- no focal fluid collection- completed IV antibiotics- continue to monitor off of them   On IV Reglan- montior QT interval   Antihypertensives being slowly added now that PEG is in place-tube feeds held due to secretions and bowel burden  Continue bowel regimen- miralax bid, tap water enemas- GI on board   Unfortunately stool is now coming out of the peg tube, trach collar and suctioning  Will order another CT scan to see if there is possible obstruction  Will most likely need to place NG tube- discussed with surgery and nursing   Add scopolamine patch   New picc 12/2  CV surgery, cards and critical care on consult.   TTE LVEF 75-80%.   Cont BP control per Cards  -s/p pRBC transfusion on 12/4 with repeat hg stable          - Plan is to eventually discharge to LTAC  PEG tube to low intermittent suction per GI- TFs stopped     Acute hypoxic respiratory failure   Aspiration event   Completed 10 days of zosyn.   Sputum cx candida   Failed SBT multiple times. Plans for trach tomorrow. PEG 12/5   ENT and GI on consult.  Pulmonary on consult.   CXR with multifocal airspace dz  Albuterol nebs   Status post trach placement 12/4  Status post PEG placement  12/5- resume tube feeds   Seroquel started to help with agitation 50 mg BID  On precedex and propofol- unsucessful weaning due to agitation and thick secretions   12/12:  off propofol, on precedex. Psych consulted   12/13 weaning precedex, psych recommends Haldol and ativan PRN       H/o tobacco and ETOH abuse   Duonebs PRN   CIWA protocol  NAIMA on CKD III   Renal on consult.   [place] to be secondary to MARY LOU/ATN   Now on lasix drip to 10 mg/hour   Doppler renal ultrasound unremarkable for  renal artery stenosis  If negative Renal will add minoxidil    Essential HTN   Coreg 25 mg BID, norvasc 10mg daily, hydralazine 150mg TID. Clonidinie patch 0.2mg TID   Add hydralazine combination with isosobide 150-40 mg TID   IV lasix given- had 1 liter of urine output - now changed to lasix drip   Webb in place   ARB on hold due to NAIMA.   12/14: On labetalol and Cardene drip     Iron def anemia  IV iron.   Iron level low   Transfuse for Hb < 7.0   Other medical problems  Morbid Obesity   TANYA     12/12: spoke to sister Mariajose , notify her of current clinical conditions, current treatment and plan. Answered all questions.   12/14: spoke to sister Mariajose , notify her of current clinical conditions, current treatment and plan. Answered all questions.    Quality:  DVT Prophylaxis: Heparin   CODE status: FULL   DISPO: pending clinical improvement.   Estimated date of discharge: To be determined  Discharge is dependent on: Improved clinical status  At this point Patient is expected to be discharge to: Home versus rehab      Plan of care discussed with Patient and RN.     Coordinated care with providers and counseling re: treatment plan and workup     Radha Tidwell MD    Supplementary Documentation:          I personally reviewed the available laboratories, imaging including operative report. I discussed/will discuss the case with patient and her nurse. I ordered laboratories studies. I adjusted medications including not applicable today. Medical decision making high, risk is high.     >55min spent, >50% spent counseling and coordinating care in the form of educating pt/family and d/w consultants and staff. Most of the time spent discussing the above plan.

## 2024-12-15 NOTE — PLAN OF CARE
Problem: RESPIRATORY - ADULT  Goal: Achieves optimal ventilation and oxygenation  Description: INTERVENTIONS:  - Assess for changes in respiratory status  - Assess for changes in mentation and behavior  - Position to facilitate oxygenation and minimize respiratory effort  - Oxygen supplementation based on oxygen saturation or ABGs  - Provide Smoking Cessation handout, if applicable  - Encourage broncho-pulmonary hygiene including cough, deep breathe, Incentive Spirometry  - Assess the need for suctioning and perform as needed  - Assess and instruct to report SOB or any respiratory difficulty  - Respiratory Therapy support as indicated  - Manage/alleviate anxiety  - Monitor for signs/symptoms of CO2 retention  Outcome: Progressing   Patient received with tracheostomy and on ventilator PRVC 18/550/+5/30%. Bilateral breath sounds auscultated. Suction provided when indicated. SBT initiated at 1030, see documentation below. No acute events during the daytime. RT will continue to monitor.      SBT: PASS/FAIL? (Pass)  Start time:  1150  Settings:  Pressure Support: 10    CPAP: 5    FiO2:  30  Reason for Failure if present:   N/A      Weaning Parameters:  RR: 18  RSBI: 36  NIF: -  VT: 500  VC: -     Returned to Full support: (Time:1150)  MD notified: (Physician: Redd)  Current Ventilator Settings:   - Mode: PRVC   - Rate: 18   - Tidal Volume:550   - FiO2: 30   - PEEP +5

## 2024-12-15 NOTE — PROGRESS NOTES
[facility]  Cardiology Progress Note    Alisha Wilde Patient Status:  Inpatient    10/25/1965 MRN [id number]   Location [facility] 2W/SW Attending Radha Tidwell MD   Hosp Day # 23 PCP Bridger Avendano MD     Subjective     No new issues.    Objective:   Patient Vitals for the past 24 hrs:   BP Temp Temp src Pulse Resp SpO2   12/15/24 0728 -- -- -- 75 20 100 %   12/15/24 0700 132/55 -- -- 73 18 100 %   12/15/24 0600 129/58 -- -- 74 18 100 %   12/15/24 0500 130/55 -- -- 74 18 100 %   12/15/24 0409 -- 98 °F (36.7 °C) Temporal -- -- --   12/15/24 0400 126/52 -- -- 79 25 100 %   12/15/24 0300 138/61 -- -- 74 18 100 %   12/15/24 0200 141/60 -- -- 77 18 100 %   12/15/24 0100 136/63 -- -- 75 18 100 %   12/15/24 0000 136/61 97.7 °F (36.5 °C) Temporal 76 18 100 %   24 2300 134/60 -- -- 74 18 100 %   24 2200 131/59 -- -- 79 18 100 %   24 2100 134/59 -- -- 73 18 100 %   24 2000 133/60 98.7 °F (37.1 °C) Temporal 73 18 100 %   24 1900 135/59 -- -- 75 18 100 %   24 1800 139/62 -- -- 78 16 100 %   24 1700 138/64 -- -- 77 18 100 %   24 1600 137/59 98 °F (36.7 °C) Temporal 77 18 100 %   24 1528 -- -- -- 76 18 100 %   24 1500 140/62 -- -- 77 18 100 %   24 1400 137/60 98.5 °F (36.9 °C) Temporal 77 18 100 %   24 1345 138/61 98.6 °F (37 °C) Temporal 76 18 100 %   24 1300 132/60 -- -- 78 18 100 %   24 1245 137/58 98.5 °F (36.9 °C) Temporal 80 18 100 %   24 1200 149/62 98.8 °F (37.1 °C) Temporal 82 (!) 28 100 %   24 1145 136/57 98.7 °F (37.1 °C) Temporal 77 18 100 %   24 1102 -- -- -- 77 24 100 %   24 1100 136/56 -- -- 75 18 100 %   24 1000 136/54 -- -- 77 20 99 %   24 0900 93/78 -- -- 73 22 100 %   24 0800 125/58 98.6 °F (37 °C) Temporal 76 22 100 %     Intake/Output:   Last 3 shifts:   Intake/Output                   24 0700 - 24 0659 24 0700 - 12/15/24 0659  12/15/24 0700 - 12/16/24 0659       Intake    P.O.  --  0  --    P.O. -- 0 --    I.V.  2670.2  4417.5  --    I.V. 451 2927.7 --    Volume (mL) Dexmedetomidine 309.2 233.7 --    Volume (mL) Labetalol 1170 568.8 --    Volume (mL) Nicardipine 740 687.3 --    Blood  --  305  --    Volume (Transfuse RBC) -- 305 --    NG/GT  --  0  --    Intake (mL) (Gastrostomy/Enterostomy Percutaneous endoscopic gastrostomy (PEG) 1 20 Fr. LUQ) -- 0 --    IV PIGGYBACK  --  700  --    Volume (mL) (piperacillin-tazobactam (Zosyn) 3.375 g in dextrose 5% 100 mL IVPB-ADDV) -- 200 --    Volume (mL) (vancomycin (Vancocin) 1.75 g in sodium chloride 0.9% 500mL IVPB premix) -- 500 --    TPN  1218.8  1025.3  --    Volume Infused  (mL) 1218.8 1025.3 --    Total Intake 3889 6447.8 --       Output    Urine  2550  2725  --    Output (mL) (Urethral Catheter) 2550 2725 --    Emesis/NG output  100  720  --    Emesis Occurrence -- 1 x --    Output (mL) (Gastrostomy/Enterostomy Percutaneous endoscopic gastrostomy (PEG) 1 20 Fr. LUQ) 100 720 --    Stool  --  --  --    Stool Count Calculated for I/O -- 1 x --    Total Output 2650 3445 --       Net I/O     1239 3002.8 --             Vent Settings: Vent Mode: PRVC/AC  FiO2 (%):  [30 %] 30 %  S RR:  [18] 18  S VT:  [550 mL] 550 mL  PEEP/CPAP (cm H2O):  [5 cm H20] 5 cm H20  MAP (cm H2O):  [11-15] 12      Scheduled Meds:    furosemide  40 mg Intravenous Daily    haloperidol lactate  2 mg Intravenous TID    LORazepam  1 mg Intravenous TID    cloNIDine  1 patch Transdermal Weekly    piperacillin-tazobactam  3.375 g Intravenous Q8H    vancomycin  12.5 mg/kg Intravenous Q24H    carvedilol  37.5 mg Oral BID with meals    aspirin  81 mg Peg Tube Daily    isosorbide dinitrate  40 mg Per NG Tube TID (Nitrates)    hydrALAZINE  150 mg Oral Q8H GABRIEL    amLODIPine  10 mg Oral Daily    heparin  5,000 Units Subcutaneous Q8H GABRIEL    chlorhexidine gluconate  15 mL Mouth/Throat BID    famotidine  20 mg Intravenous Daily        Continuous Infusions:    adult 3 in 1 TPN 83.3 mL/hr at 12/14/24 2109    labetalol (Trandate) 400 mg in sodium chloride 0.9% 200 mL infusion 0.5 mg/min (12/15/24 0716)    dextrose 10%      dexmedetomidine 0.6 mcg/kg/hr (12/15/24 0436)    niCARdipine 15 mg/hr (12/15/24 0435)       Results:     Lab Results   Component Value Date    WBC 7.5 12/15/2024    HGB 8.0 (L) 12/15/2024    HCT 25.2 (L) 12/15/2024    .0 12/15/2024    CREATSERUM 1.24 (H) 12/15/2024    CREATSERUM 1.24 (H) 12/15/2024    BUN 27 (H) 12/15/2024    BUN 27 (H) 12/15/2024     (H) 12/15/2024     (H) 12/15/2024    K 4.6 12/15/2024    K 4.6 12/15/2024     (H) 12/15/2024     (H) 12/15/2024    CO2 23.0 12/15/2024    CO2 23.0 12/15/2024     (H) 12/15/2024     (H) 12/15/2024    CA 8.5 (L) 12/15/2024    CA 8.5 (L) 12/15/2024    ALB 3.2 12/15/2024    ALKPHO 68 12/15/2024    BILT 0.6 12/15/2024    TP 5.7 12/15/2024    AST 25 12/15/2024    ALT 23 12/15/2024    PTT 30.7 12/04/2024    INR 1.17 12/04/2024    TSH 2.085 12/07/2024    NATHALIE 99 11/25/2024    LIP 28 11/25/2024    DDIMER 0.42 12/08/2019    ESRML 15 06/05/2021    MG 2.1 12/15/2024    PHOS 4.0 12/15/2024    TROP <0.045 12/08/2019     (H) 09/23/2021    B12 1,156 (H) 07/23/2018       Recent Labs   Lab 12/13/24  0612 12/14/24  0631 12/15/24  0445   * 161* 160*  160*   BUN 27* 26* 27*  27*   CREATSERUM 1.28* 1.28* 1.24*  1.24*   CA 8.4* 8.4* 8.5*  8.5*   * 148* 146*  146*   K 3.4*  3.4* 4.2  4.2 4.6  4.6   * 117* 116*  116*   CO2 21.0 22.0 23.0  23.0     Recent Labs   Lab 12/09/24  0609 12/10/24  0607 12/12/24  0443 12/13/24  0904 12/14/24  0631 12/14/24  1542 12/15/24  0445   RBC 3.18*   < > 2.95* 2.67* 2.73*  --  2.98*   HGB 9.0*   < > 8.3* 7.3* 7.4* 8.0* 8.0*   HCT 27.7*   < > 25.3* 23.0* 23.6* 24.7* 25.2*   MCV 87.1   < > 85.8 86.1 86.4  --  84.6   MCH 28.3   < > 28.1 27.3 27.1  --  26.8   MCHC 32.5   < > 32.8 31.7 31.4  --   31.7   RDW 17.3*   < > 16.8* 17.0* 17.1*  --  20.2*   NEPRELIM 7.06  --  3.64  --   --   --   --    WBC 10.2   < > 6.5 6.5 8.4  --  7.5   .0   < > 275.0 240.0 254.0  --  216.0    < > = values in this interval not displayed.       No results for input(s): \"BNPML\" in the last 168 hours.    No results for input(s): \"TROP\", \"CK\" in the last 168 hours.    XR CHEST AP PORTABLE  (CPT=71045)    Result Date: 12/15/2024  CONCLUSION:   Right greater than left patchy bilateral lung opacity which may be mildly increased in the right lung base.    Dictated by (CST): Javid Watts MD on 12/15/2024 at 7:23 AM     Finalized by (CST): Javid Watts MD on 12/15/2024 at 7:24 AM          XR ABDOMEN (1 VIEW) (CPT=74018)    Result Date: 2024  CONCLUSION:  Significantly limited examination.  Ongoing dilation of small bowel loops up to 4.7 cm, previously 4.5 cm.  Recommend continued radiographic/clinical follow-up.    Dictated by (CST): Karen Ureña MD on 2024 at 11:02 AM     Finalized by (CST): Karen Ureña MD on 2024 at 11:04 AM          XR ABDOMEN (1 VIEW) (CPT=74018)    Result Date: 2024  CONCLUSION:  1. Nonspecific small bowel dilatation left mid abdomen and central abdomen measures up to 4.5 cm. 2. Mild stool scattered throughout the colon suggests constipation fecal retention.     Dictated by (CST): Woodrow Fischer MD on 2024 at 1:58 PM     Finalized by (CST): Woodrow Fischer MD on 2024 at 2:01 PM             CARD ECHO 2D DOPPLER CONTRAST (CPT=93306)    Result Date: 2024  Transthoracic Echocardiogram Name:Alisha Wilde Date: 2024 :  10/25/1965 Ht:  (65in)  BP: 120 / 65 MRN:  [id number]    Age:  59years    Wt:  (258lb) HR: Loc:             Gndr: F          BSA: 2.2m^2 Sonographer: Ping GAMEZ Ordering:    Edward Montgomery ---------------------------------------------------------------------------- History/Indications:   S/P surgery aortic dissection type A.   Risk factors: Hypertension. Dyslipidemia. ---------------------------------------------------------------------------- Procedure information:  A transthoracic complete 2D study was performed. Additional evaluation included M-mode, complete spectral Doppler, and color Doppler.  Patient status:  Inpatient.  Location:  [facility]    Comparison was made to the study of 09/30/2022.    This was a routine study. Transthoracic echocardiography for diagnosis and ventricular function evaluation. Image quality was suboptimal. The study was technically limited due to poor acoustic window availability, restricted patient mobility, surgical dressings, body habitus, and patient on ventilator. Satisfactory imaging could not be obtained from the subcostal or suprasternal notch acoustic window(s). Intravenous contrast (Definity) was administered to evaluate left ventricular function. ECG rhythm:   Normal sinus ---------------------------------------------------------------------------- Conclusions: 1. Left ventricle: The cavity size was normal. Wall thickness was mildly    increased. Systolic function was hyperdynamic. The estimated ejection    fraction was 75-80%, by visual assessment. No diagnostic evidence for    regional wall motion abnormalities. Left ventricular diastolic function    parameters were normal. 2. Right ventricle: Catheter noted in the right ventricle. 3. Ventricular septum: Thickness was moderately increased. 4. Right atrium: Catheter noted in right atrium. 5. Pulmonary arteries: Systolic pressure was within the normal range. * ---------------------------------------------------------------------------- * Findings: Left ventricle:  The cavity size was normal. Wall thickness was mildly increased. Systolic function was hyperdynamic. The estimated ejection fraction was 75-80%, by visual assessment. No diagnostic evidence for diffuse regional wall motion abnormalities. No diagnostic evidence for regional wall motion  abnormalities. Left ventricular diastolic function parameters were normal. Ventricular septum:   Thickness was moderately increased. Left atrium:  The atrium was normal in size. Right ventricle:  The cavity size was normal. Catheter noted in the right ventricle. Systolic function was normal. Right atrium:  The atrium was normal in size. Catheter noted in right atrium. Mitral valve:  The valve was structurally normal. The leaflets were normal thickness. Leaflet separation was normal.  Doppler:  Transvalvular velocity was within the normal range. There was no evidence for stenosis. There was trace regurgitation. Aortic valve:  The valve was structurally normal. The valve was trileaflet. The leaflets were normal thickness. Cusp separation was normal.  Doppler: Transvalvular velocity was within the normal range. There was no evidence for stenosis. There was no significant regurgitation. Tricuspid valve:  The valve is structurally normal. Leaflet separation was normal.  Doppler:  Transvalvular velocity was within the normal range. There was no evidence for stenosis. There was trivial regurgitation. Pulmonic valve:   The valve is structurally normal. Cusp separation was normal.  Doppler:  Transvalvular velocity was within the normal range. There was no evidence for stenosis. There was mild regurgitation. Pericardium:   There was no pericardial effusion. Aorta: Aortic root: The aortic root was normal-sized. Ascending aorta: The ascending aorta was normal. Pulmonary arteries: The main pulmonary artery was normal-sized. Systolic pressure was within the normal range. Systemic veins:  Not visualized. Inferior vena cava: The IVC was normal-sized. ---------------------------------------------------------------------------- Measurements  Left ventricle                    Value        Ref  IVS thickness, ED, PLAX       (H) 1.3   cm     0.6 - 0.9  LV ID, ED, PLAX                   3.9   cm     3.8 - 5.2  LV ID, ES, PLAX                (L) 2.0   cm     2.2 - 3.5  LV PW thickness, ED, PLAX     (H) 1.2   cm     0.6 - 0.9  IVS/LV PW ratio, ED, PLAX         1.08         ---------  LV PW/LV ID ratio, ED, PLAX       0.31         ---------  LV ejection fraction          (H) 81    %      54 - 74  Stroke volume/bsa, 2D             20    ml/m^2 ---------  LV e', lateral                    11.4  cm/sec >=10.0  LV E/e', lateral                  5            <=13  LV e', medial                     8.4   cm/sec >=7.0  LV E/e', medial                   6            ---------  LV e', average                    9.9   cm/sec ---------  LV E/e', average                  5            <=14  LVOT                              Value        Ref  LVOT ID                           2     cm     ---------  LVOT peak velocity, S             0.89  m/sec  ---------  LVOT VTI, S                       13.6  cm     ---------  LVOT peak gradient, S             3     mm Hg  ---------  LVOT mean gradient, S             1     mm Hg  ---------  Stroke volume (SV), LVOT DP       43    ml     ---------  Stroke index (SV/bsa), LVOT       19    ml/m^2 ---------  DP  Aortic valve                      Value        Ref  Aortic valve VTI, S               18.2  cm     ---------  Velocity ratio, peak, LVOT/AV     0.82         ---------  Aortic root                       Value        Ref  Aortic root ID                    3.3   cm     2.8 - 4.3  Aortic root ID, STJ, ED           3.0   cm     2.0 - 3.2  Aortic root ID, ED, MM            3.5   cm     ---------  Ascending aorta                   Value        Ref  Ascending aorta ID                2.7   cm     1.9 - 3.5  Left atrium                       Value        Ref  LA ID, A-P, ES                    3.7   cm     2.7 - 3.8  LA volume, S                  (H) 66    ml     22 - 52  LA volume/bsa, S                  30    ml/m^2 16 - 34  LA volume, ES, 1-p A4C            49    ml     22 - 52  LA volume, ES, 1-p A2C        (H) 80    ml     22 - 52   LA volume, ES, A/L                75    ml     ---------  LA volume/bsa, ES, A/L            34    ml/m^2 16 - 34  LA/aortic root ratio              1.12         ---------  Mitral valve                      Value        Ref  Mitral E-wave peak velocity       0.53  m/sec  ---------  Mitral A-wave peak velocity       0.42  m/sec  ---------  Mitral deceleration time          225   ms     ---------  Mitral E/A ratio, peak            1.2          ---------  Mitral regurg peak velocity       311   cm/sec ---------  Mitral peak LV-LA gradient, S     38.69 mm Hg  ---------  Pulmonary artery                  Value        Ref  PA pressure, S, DP                25    mm Hg  ---------  PA pressure, ED, DP               11    mm Hg  ---------  Tricuspid valve                   Value        Ref  Tricuspid regurg peak             2.07  m/sec  <=2.8  velocity  Tricuspid peak RV-RA gradient     17    mm Hg  ---------  Right atrium                      Value        Ref  RA ID, S-I, ES, A4C               5.2   cm     3.4 - 5.3  RA ID/bsa, S-I, ES, A4C           2.3   cm/m^2 1.9 - 3.1  RA area, ES, A4C                  18    cm^2   10 - 18  RA volume, ES, 1-p A4C            52    ml     ---------  RA volume/bsa, ES, 1-p A4C        24    ml/m^2 9 - 33  Systemic veins                    Value        Ref  Estimated CVP                     8     mm Hg  ---------  Right ventricle                   Value        Ref  TAPSE, MM                     (L) 1.07  cm     >=1.70  RV pressure, S, DP                25    mm Hg  ---------  RV s', lateral                (L) 4.1   cm/sec >=9.5  Pulmonic valve                    Value        Ref  Pulmonic valve peak velocity,     1.5   m/sec  ---------  S  Pulmonic regurg peak velocity     1.76  m/sec  ---------  Pulmonic regurg peak gradient     12    mm Hg  ---------  Pulmonic regurg velocity, ED      0.93  m/sec  --------- Legend: (L)  and  (H)  deion values outside specified reference range.  ---------------------------------------------------------------------------- Prepared and electronically signed by Christopher White 11/23/2024 15:40        Exam:     General: No acute distress  HEENT: +trach  Neck: No JVD, carotids 2+, no bruits.  Cardiac: Regular rate and rhythm. S1, S2 normal.   Lungs: +rhonchi   Abdomen: Soft, non-tender.BS-present.  Extremities: Without clubbing or cyanosis. + BLE edema.    Neurologic: unable to obtain  Skin: Warm and dry.     Assessment and Plan:   Acute hypoxic respiratory failure  Suspected ileus, associated with fecal matter aspiration and emesis  Type A aortic dissection  NAIMA on CKD-III, improved  Obesity  EtOH abuse  -presented with acute onset CP   -CT with type A aortic dissection above right coronary artery and extending distally into the left common iliac artery and external iliac   -s/p emergent successful repair on 11/22/24  -had aspiration event following self-extubation, re-intubated with multiple failed SBT; now s/p trach/PEG tube                 -on broad spectrum Abx and NPO again due to fecal matter emesis and ileus      Hypertension  -TTE on 11/23 with hyperdynamic LVEF  -continue BP management - substantial component of agitation worsening BP now that she is actively undergoing sedation wean                 -NPO after aspiration event and worsening ileus                 -holding coreg from 37.5 mg, clonidine  0.3 mg 3 times daily, combination hydralazine + isosorbide 150mg-40mg TID, amlodipine 10 mg daily                 -continue nicardipine gtt                 -continue labetalol gtt                 -continue clonidine patch  -monitor rhythm on tele       Frank Oconnor MD  [facility]  12/15/2024

## 2024-12-15 NOTE — PROGRESS NOTES
Pulmonary/ICU/Critical Care Progress Note        Reason for Consultation: post op care  Referring Physician: Dr. Gregg    Seen this am.     SUBJECTIVE:  Afebrile  Had a bowel movement and reportedly vomited  Remains on labetolol/nicardipine  On precedex but lower dose  One dose of fentanyl overnight       ALLERGIES:  Allergies[1]        MEDS:  Home Medications:  Medications Taking[2]    Scheduled Medication:   furosemide  40 mg Intravenous Daily    haloperidol lactate  2 mg Intravenous TID    LORazepam  1 mg Intravenous TID    cloNIDine  1 patch Transdermal Weekly    piperacillin-tazobactam  3.375 g Intravenous Q8H    vancomycin  12.5 mg/kg Intravenous Q24H    carvedilol  37.5 mg Oral BID with meals    aspirin  81 mg Peg Tube Daily    isosorbide dinitrate  40 mg Per NG Tube TID (Nitrates)    hydrALAZINE  150 mg Oral Q8H GABRIEL    amLODIPine  10 mg Oral Daily    heparin  5,000 Units Subcutaneous Q8H GABRIEL    chlorhexidine gluconate  15 mL Mouth/Throat BID    famotidine  20 mg Intravenous Daily     Continuous Infusing Medication:   adult 3 in 1 TPN      adult 3 in 1 TPN 83.3 mL/hr at 12/14/24 2109    labetalol (Trandate) 400 mg in sodium chloride 0.9% 200 mL infusion 0.5 mg/min (12/15/24 0716)    dextrose 10%      dexmedetomidine 0.4 mcg/kg/hr (12/15/24 1212)    niCARdipine 15 mg/hr (12/15/24 1208)     PRN Medications:    LORazepam    haloperidol lactate    dextrose 10%    fentaNYL    fentaNYL    bisacodyl    acetaminophen    sodium chloride    albuterol    hydrALAzine    acetaminophen    ipratropium-albuterol    HYDROcodone-acetaminophen    melatonin    potassium chloride **OR** potassium chloride    magnesium sulfate in dextrose 5%    magnesium sulfate in sterile water for injection       PHYSICAL EXAM:  /56 (BP Location: Left arm)   Pulse 78   Temp 97.9 °F (36.6 °C) (Oral)   Resp 24   Ht 5' 5\" (1.651 m)   Wt (!) 309 lb 1.4 oz (140.2 kg)   SpO2 100%   BMI 51.43 kg/m²   Vent Mode: PRVC/AC  FiO2 (%):  [30 %]  30 %  S RR:  [18] 18  S VT:  [550 mL] 550 mL  PEEP/CPAP (cm H2O):  [5 cm H20] 5 cm H20  MAP (cm H2O):  [11-14] 13  CONSTITUTIONAL: intubated opens eyes follows commands  HEENT: atraumatic normocephalic  MOUTH: MMM  NECK/THROAT: no JVD. Trachea midline. No obvious thyromegaly. + trach with min bleeding   LUNG: vented upper clear BS b/l no wheezing, + crackles. Diminished at bases. Chest symmetric with respiratory motion. + midsternal surgical wound healing   HEART: regular rate and rhythm, no obvious murmers or gallops noted  ABD: soft distended, less tender today. + hypoactive bowel sounds. No organomegaly noted. + PEG tube to LIS  EXT: no clubbing, cyanosis, or edema noted. Pulses intact grossly  NEURO/MUSCULOSKELETAL: intubated, opens eyes, follows commands, wiggles toes   SKIN: warm, dry. No obvious lesions noted  LYMPH: no obvious LAD      IMAGES:   CXR 12/15/24  On my read RLL infiltrates more dense but less pulm edema  CARDIAC/MEDIAST: Heart and mediastinum are unchanged with previous median sternotomy.   LUNGS/PLEURA: Right greater left patchy bilateral lung opacity which may be mildly increased in the right lung base.  No significant pleural effusion.  No pneumothorax.   OTHER: Stable appearance of the osseous structures.  Tracheostomy tube and right PICC line also unchanged.     KUB 12/14/24  No change in dilation of SB  Significantly limited examination.   Ongoing dilation of small bowel loops up to 4.7 cm, previously 4.5 cm. Recommend continued radiographic/clinical follow-up.       CXR 12/12/24  CONCLUSION:   1. Cardiomegaly.  Tortuous aorta.   2. Bilateral mixed multifocal airspace consolidation with slight improved aeration left lung base.   3. Tracheostomy tube overlies the tracheal air column.  Right PICC line with the tip in the SVC.      KUB 12/12/24  CONCLUSION:   1. Nonspecific small bowel dilatation left mid abdomen and central abdomen measures up to 4.5 cm.   2. Mild stool scattered throughout  the colon suggests constipation fecal retention.      CT A/P 12/10/24  CONCLUSION:   1. Fluid-filled loops of small bowel, which could reflect underlying infectious/inflammatory enteritis or malabsorption in the appropriate clinical setting. Bowel loops are mildly dilated without clear transition point to suggest mechanical bowel   obstruction, although early or partial bowel obstruction could have a similar appearance.   2. Extensive distal colonic diverticulosis without CT evidence of acute complication.    3. Bladder distension despite Webb catheter placement.   4. Hepatomegaly with hepatic steatosis.   5. Diffuse anasarca.   6. Partially visualized postthoracotomy chest with possible postoperative seroma/hematoma of the anterior chest wall.   7. Bilateral pleural effusions and associated basilar atelectasis, with or without superimposed pneumonia.   8. Additional scattered patchy nodular opacities may reflect infectious process.    9. Low-density appearance of the intracardiac blood pool raises the possibility of underlying anemia. Correlate with hematologic parameters.   10. Lesser incidental findings as above.     CXR 12/9/24  CONCLUSION:   1. Cardiomegaly.  Atherosclerotic aneurysmal thoracic aorta   2. Tracheostomy tube overlies tracheal air column.   3.  Bilateral mixed alveolar and interstitial multifocal airspace opacification has progressed.        KUB 12/9/24  CONCLUSION:   No huang dilatation of the small bowel to suggest small bowel obstruction.   Large cecal stool burden which may indicate constipation. Mild stool burden throughout the remainder of the colon.     CXR 12/5/24  No significant change when compared to 12/04/2024.     CXR 12/4/24  CONCLUSION:   1. Mild cardiomegaly and minimally prominent pulmonary vascularity but no huang pulmonary edema.  ET tube and NG tubes have been removed.  Tracheostomy is now noted in the trachea extending to the level the clavicles.  No pneumothorax.   2.  Persistent patchy bilateral upper lobe airspace disease right more than left suggesting persistent bilateral upper lobe pneumonia.  Correlate clinically and continued follow-up is advised.     CXR 12/2/24  FINDINGS:   POSITION: The patient is semi-erect and slightly rotated to the right.   DEVICES: There is an endotracheal tube terminating approximately 3.9 cm above the jennyfer.  A large-bore right internal jugular central venous vascular access sheath has tip projecting in the SVC. An enteric tube extends caudally beyond the field of view.   CARDIAC/VASC: The cardiomediastinal silhouette is accentuated by AP technique, but likely stably enlarged. There is mild tortuosity of the thoracic aorta with peripheral atherosclerotic vascular calcification. The pulmonary vascularity is within normal   limits.   MEDIAST/HAMLET: Surgical clips are present.   LUNGS/PLEURA: Elevation right hemidiaphragm is noted. Multifocal patchy airspace consolidation is demonstrated, slightly worse on the right side than left. Mild interstitial opacities are seen. Additional scattered reticular opacities may be atelectatic   in nature. No large pleural effusion or pneumothorax is evident.    BONES: Median sternotomy wires are demonstrated. Mild degenerative changes of the thoracic spine are apparent. There is no fracture or visible bony lesion.   OTHER: There may be surgical clips at the level of the thoracic inlet. Leads overlie the chest and obscure underlying detail     CXR 11/27/24  Moderate pulmonary edema, progressed since 11/26/2024.     CT c/a/p  CONCLUSION:  Low-lying ETT, 0.8 cm above the jennyfer   Multifocal bilateral pulmonary nodular consolidation s    CXR 11/24/24  FINDINGS:   CARDIAC/VASC: The cardiac silhouette is exaggerated by AP portable technique. There is stable central pulmonary venous congestion.   MEDIAST/HAMLET:   No visible mass or adenopathy.   LUNGS/PLEURA: There are stable streaky opacities at the right lung base.  No  pleural effusion or pneumothorax.   BONES: Sternotomy changes are again noted.   OTHER: An endotracheal tube tip projects 3.8 cm above the jennyfer.  An enteric tube again courses into the left upper quadrant.  A right internal jugular approach De Peyster-Dev catheter tip again projects over the pulmonary outflow tract.  A mediastinal drain tip again projects over the right suprahilar region.     CXR 11/23/24  On my read possible RML infiltrates  CONCLUSION: Post emergent acute type A aortic dissection repair.  Stable cardiomegaly.  Gross stable/satisfactory position of lines and tubes.  Mild pulmonary vascular congestion.       CXR 11/22/24  CONCLUSION: Post feeding tube placement with tip in the distal esophagus approximately 4 cm above the gastroesophageal junction.  Recommend advancement of the tube by approximately 10 cm to place it in the stomach.     CXR 11/22/24  CARDIAC/MEDIASTINUM: The cardiac silhouette is enlarged and unchanged.. There is a right internal jugular De Peyster-Dev catheter with tip at the level of the main pulmonary artery. There is a right-sided chest tube. Endotracheal tube with tip approximately    5.3 cm above the jennyfer. There is a nasogastric tube with tip and sidehole within the stomach and below the diaphragm. There are median sternotomy wires and postoperative changes of ascending aortic repair.   LUNGS: There is pulmonary vascular redistribution without edema. Minimal blunting of the bilateral costophrenic angles may be secondary to trace pleural effusions versus chronic pleural thickening. There is mild bibasilar atelectasis. No pneumothorax.   BONES: There is degenerative disease of the thoracic spine.     Chest CTA 11/22/24  CONCLUSION:   1. Type a aortic dissection beginning just above right renal (correction:  Right coronary)  artery and extending distally into the left common iliac artery and external iliac.  Findings were called to Dr. Echavarria at 5:52 p.m.       LABS:  Recent Labs   Lab  12/09/24  0609 12/10/24  0607 12/12/24  0443 12/13/24  0904 12/14/24  0631 12/14/24  1542 12/15/24  0445   RBC 3.18*   < > 2.95* 2.67* 2.73*  --  2.98*   HGB 9.0*   < > 8.3* 7.3* 7.4* 8.0* 8.0*   HCT 27.7*   < > 25.3* 23.0* 23.6* 24.7* 25.2*   MCV 87.1   < > 85.8 86.1 86.4  --  84.6   MCH 28.3   < > 28.1 27.3 27.1  --  26.8   MCHC 32.5   < > 32.8 31.7 31.4  --  31.7   RDW 17.3*   < > 16.8* 17.0* 17.1*  --  20.2*   NEPRELIM 7.06  --  3.64  --   --   --   --    WBC 10.2   < > 6.5 6.5 8.4  --  7.5   .0   < > 275.0 240.0 254.0  --  216.0    < > = values in this interval not displayed.       Recent Labs   Lab 12/09/24  0609 12/09/24  0720 12/09/24  1453 12/13/24  0612 12/14/24  0631 12/15/24  0445   *  --    < > 151* 161* 160*  160*   BUN  --  37*   < > 27* 26* 27*  27*   CREATSERUM 1.51*  --    < > 1.28* 1.28* 1.24*  1.24*   EGFRCR 39*  --    < > 48* 48* 50*  50*   CA 9.1  --    < > 8.4* 8.4* 8.5*  8.5*   ALB 3.7  --   --  3.3  --  3.2     --    < > 149* 148* 146*  146*   K  --  3.9   < > 3.4*  3.4* 4.2  4.2 4.6  4.6   *  --    < > 118* 117* 116*  116*   CO2 17.0*  --    < > 21.0 22.0 23.0  23.0   ALKPHO 101  --   --  73  --  68   AST  --  24  --  15  --  25   ALT 28 25  --  16  --  23   BILT 0.3  --   --  0.5  --  0.6   TP 6.7  --   --  5.8  --  5.7    < > = values in this interval not displayed.       ASSESSMENT/PLAN:  Type A aortic dissection s/p repair now intubated in ICU  -on nicardipine/labetolol  -multiple oral antiHTN meds for BP being held d/t ileus/SBO  -s/p pRBC transfusion 12/14/24 for anemia. No obvious s/s bleeding    Post op acute respiratory failure  -complicated by extubation and reimergent intubation and significant emesis concerning for aspiration PNA vs pneumonitis  -started on zosyn-completed 10 day course  -checked ETT asp-candida likely contaminant  -failed multiple SBTs d/t increased RR, work of breathing, hypertension, hypoxemia, significant thick  secretions  -remains MV dependent   -hx of likely TANYA and previous smoking hx. Has sign dense consolidations and atelectasis on chest CT  -s/p trach by ENT on 12/4/24 and PEG by GI on 12/5/24  -start trach/vent weaning as able-limited by sedation needs, BP, and GI issues  -PRN ABG and CXR  -saline nebs  -on propofol and precedex-having difficulty weaning as pt becomes agitated and hypertensive. Started seroquel 50 mg BID but holding since can't use PEG tube. Started fentanyl but this will contribute to ileus  -psych consult for assistance with sedation and agitation-defer to their recs  -on scheduled clonidine patch in attempt to wean off precedex  -restarted on vanco/zosyn for aspiration PNA. Stop vanco  -SBT this am for 90 min    Previous hx of smoking and ETOH  -continue duonebs PRN    NAIMA on CKD and metabolic acidosis  -likely from surgery, and acute issues  -monitor UO and renal labs  -renal following   -diuresis as per cardiology and renal    Abd pain  -s/p abd CT as above  -not tolerating TF now  -PEG tube to LIS  -GI and surgery following. Possible CT with oral contrast today?    Proph:  -DVT: hep sq    Dispo:  -full code  -SW/ to assist with LTAC placement    Critical care time 30 min independent of procedures      Thank you for the opportunity to care for Alisha Wilde.      SIDDHARTH Rainey DO, MPH  Pulmonary Critical Care Medicine  [facility]                       [1]   Allergies  Allergen Reactions    Atorvastatin MYALGIA    Pravastatin MYALGIA    Rosuvastatin MYALGIA    Seasonal Runny nose   [2]   Outpatient Medications Marked as Taking for the 11/21/24 encounter (Hospital Encounter)   Medication Sig Dispense Refill    Acetaminophen ER (TYLENOL 8 HOUR) 650 MG Oral Tab CR Take 2 tablets (1,300 mg total) by mouth daily as needed.      Calcium Carb-Cholecalciferol 600-10 MG-MCG Oral Tab Take 1 tablet by mouth daily.      metoprolol succinate ER 50 MG  Oral Tablet 24 Hr Take 1 tablet (50 mg total) by mouth daily. 90 tablet 3    Losartan Potassium-HCTZ 100-12.5 MG Oral Tab Take 1 tablet by mouth daily. 90 tablet 3    Potassium Chloride ER 20 MEQ Oral Tab CR Take 1 tablet by mouth daily. 90 tablet 3    albuterol 108 (90 Base) MCG/ACT Inhalation Aero Soln Inhale 2 puffs into the lungs every 4 (four) hours as needed for Wheezing. 3 each 3    amLODIPine 10 MG Oral Tab Take 1 tablet (10 mg total) by mouth daily. 90 tablet 3    Ascorbic Acid (VITAMIN C) 1000 MG Oral Tab Take 1 tablet (1,000 mg total) by mouth daily.      vitamin B-12 50 MCG Oral Tab Take 1 tablet (50 mcg total) by mouth daily.

## 2024-12-15 NOTE — PROGRESS NOTES
[facility]  part of [facility]    Progress Note    Alisha Wilde Patient Status:  Inpatient    10/25/1965 MRN [id number]   Location [facility] 2W/SW Attending Radha Tidwell MD   Hosp Day # 23 PCP Bridger Avendano MD     Subjective:  In bed on exam.  Currently on full vent support w/Trach in place. She easily awakens to verbal stimuli.  She was able to talk slightly this a.m., respiratory to check trach cuff this a.m.  She is calm, still on low-dose Precedex. She nods head to simple questions.  She currently denies pain.  Able to follow some simple commands such as squeezes hands and wiggles toes. Pt extremely weak.   No visitors at bedside.     Objective:  /55 (BP Location: Left arm)   Pulse 75   Temp 98 °F (36.7 °C) (Temporal)   Resp 20   Ht 5' 5\" (1.651 m)   Wt (!) 309 lb 1.4 oz (140.2 kg)   SpO2 100%   BMI 51.43 kg/m²     Temp (24hrs), Av.4 °F (36.9 °C), Min:97.7 °F (36.5 °C), Max:98.8 °F (37.1 °C)  Telemetry-NSR    Intake/Output:    Intake/Output Summary (Last 24 hours) at 12/15/2024 0848  Last data filed at 12/15/2024 0600  Gross per 24 hour   Intake 6447.8 ml   Output 3445 ml   Net 3002.8 ml       Wt Readings from Last 3 Encounters:   24 (!) 309 lb 1.4 oz (140.2 kg)   10/24/24 254 lb (115.2 kg)   24 249 lb (112.9 kg)       Allergies:  Allergies[1]    Labs:  Lab Results   Component Value Date    WBC 7.5 12/15/2024    HGB 8.0 12/15/2024    HCT 25.2 12/15/2024    .0 12/15/2024    CREATSERUM 1.24 12/15/2024    CREATSERUM 1.24 12/15/2024    BUN 27 12/15/2024    BUN 27 12/15/2024     12/15/2024     12/15/2024    K 4.6 12/15/2024    K 4.6 12/15/2024     12/15/2024     12/15/2024    CO2 23.0 12/15/2024    CO2 23.0 12/15/2024     12/15/2024     12/15/2024    CA 8.5 12/15/2024    CA 8.5 12/15/2024    ALB 3.2 12/15/2024    ALKPHO 68 12/15/2024    BILT 0.6 12/15/2024    TP 5.7 12/15/2024    AST 25 12/15/2024    ALT  23 12/15/2024    MG 2.1 12/15/2024    PHOS 4.0 12/15/2024       Physical Exam:  Blood pressure 132/55, pulse 75, temperature 98 °F (36.7 °C), temperature source Temporal, resp. rate 20, height 5' 5\" (1.651 m), weight (!) 309 lb 1.4 oz (140.2 kg), SpO2 100%, not currently breastfeeding.  General: NAD  Neck: Unable to assess JVD due to body habitus, + trach  Lungs: Coarse bilaterally anteriorly & laterally   Heart: RRR, S1, S2  Abdomen: Distended/Obese, slightly tender (nods head), BS+x 4/PEG to LIS about 500cc drainage, emesis x 1 overnight.   Extremities: Warm, dry, 1-2+ generalized edema -slowly improving  Pulses: 2+ bilat DP  Skin: sternotomy incision CATHIE=CDI-healing well  Neurological:  awakens to verbal stimuli/nods head to simple questions/moves all ext on command.     Assessment/Plan:  S/P EMERGENT AORTIC DISSECTION REPAIR POD # 23  Respiratory Failure w/multiple failed SBT prior to trach placement -currently on full vent support-continue daily weaning trials w/mgt per Pulm. Re-intubated on 11/22 per Pulm after emesis episode/ETT removal due to emesis content noted in airway.    S/P Trach placement per ENT on 12/4 w/Trach changed 12/10, check cuff this a.m., ability to speak slightly  Completed ABX course for suspected aspiration pneumonia post op.  ABX re-started on 12/9 after emesis episode on 12/9: concern for aspiration -continue until 12/19  +BM (s) post op however pt w/episode of emesis on 12/9 w/stool content noted/+high PEG residuals. TF continue on hold due to persistently high residuals/less today. PEG to LIS.  Mgt per GI.  1 emesis episode last night, await GI eval today, may need NGT-likely have to place with scope due to narrow airway, last 1 placed with scope by critical care   Hx: HTN: on multiple antihypertensives including IV Cardene & IV Labetolol-weaning as able: SBP goal= <130/MAP >70. Mgt per Cards. Can wean once able to take meds through PEG.   New A-line placed 12/2  New PICC placed  12/2  Pain meds as needed-avoid/limit narcotics w/recent constipation/emesis episodes/ongoing dilated bowel loops on ABD x-ray 12/14.  Scds/Heparin SubQ prophylaxis DVT prevention. HIPA 11/25=negative. Plts continue >100,000  Continue ICU status  Hx: CKD. Expected post op volume overload w/mgt per Nephrology (consulted on 11/23) for NAIMA: CR continues to improve. Limit/avoid nephrotoxic meds: Lasix per Nephrology. Aguilar remains for close I/O monitoring/immobility-new aguilar placed 12/9 then again 12/10 due to obstruction. Kidney US w/doppler on 12/8.   Expected post op anemia: w/o clinical significance, s/p 1 PRBC 12/14, improvement Hgb 8 May be slightly diluted due to volume overload.   Hx: Morbid obesity w/BMI >40-plan for RD to see when appropriate  Nutrition per TF via PEG-continues on hold due to high PEG residuals. PEG placed 12/5 per GI.  PEG to LIS. Mgt per GI.  Continue TPN  Hx: ETOH abuse-monitor for withdrawals. CIWA protocol PRN   Psych consulted 12/12 to assist w/med mgt-appreciate recs-Haldol/Ativan, continue to wean Precedex    Discharge planning: pt lives alone and has supportive family.   Anticipate LTAC at discharge once GI issues resolve, able to wean off of into IV antihypertensives and IV sedation.  Referrals sent proactively by  on 11/29 for LTAC.      Plan of care discussed w/RN and CV Surgeon: Dr. Marysol Randle, APRN  12/15/2024  0 855       [1]   Allergies  Allergen Reactions    Atorvastatin MYALGIA    Pravastatin MYALGIA    Rosuvastatin MYALGIA    Seasonal Runny nose

## 2024-12-16 ENCOUNTER — APPOINTMENT (OUTPATIENT)
Dept: GENERAL RADIOLOGY | Facility: HOSPITAL | Age: 59
DRG: 003 | End: 2024-12-16
Attending: INTERNAL MEDICINE
Payer: COMMERCIAL

## 2024-12-16 ENCOUNTER — APPOINTMENT (OUTPATIENT)
Dept: GENERAL RADIOLOGY | Facility: HOSPITAL | Age: 59
DRG: 003 | End: 2024-12-16
Attending: NURSE PRACTITIONER
Payer: COMMERCIAL

## 2024-12-16 ENCOUNTER — APPOINTMENT (OUTPATIENT)
Dept: GENERAL RADIOLOGY | Facility: HOSPITAL | Age: 59
End: 2024-12-16
Attending: NURSE PRACTITIONER
Payer: COMMERCIAL

## 2024-12-16 ENCOUNTER — APPOINTMENT (OUTPATIENT)
Dept: GENERAL RADIOLOGY | Facility: HOSPITAL | Age: 59
End: 2024-12-16
Attending: INTERNAL MEDICINE
Payer: COMMERCIAL

## 2024-12-16 LAB
ANION GAP SERPL CALC-SCNC: 6 MMOL/L (ref 0–18)
ANION GAP SERPL CALC-SCNC: 7 MMOL/L (ref 0–18)
BUN BLD-MCNC: 24 MG/DL (ref 9–23)
BUN BLD-MCNC: 24 MG/DL (ref 9–23)
BUN/CREAT SERPL: 20.2 (ref 10–20)
BUN/CREAT SERPL: 21.1 (ref 10–20)
CALCIUM BLD-MCNC: 8.6 MG/DL (ref 8.7–10.4)
CALCIUM BLD-MCNC: 8.7 MG/DL (ref 8.7–10.4)
CHLORIDE SERPL-SCNC: 110 MMOL/L (ref 98–112)
CHLORIDE SERPL-SCNC: 111 MMOL/L (ref 98–112)
CO2 SERPL-SCNC: 23 MMOL/L (ref 21–32)
CO2 SERPL-SCNC: 24 MMOL/L (ref 21–32)
CREAT BLD-MCNC: 1.14 MG/DL
CREAT BLD-MCNC: 1.19 MG/DL
DEPRECATED RDW RBC AUTO: 58.1 FL (ref 35.1–46.3)
EGFRCR SERPLBLD CKD-EPI 2021: 52 ML/MIN/1.73M2 (ref 60–?)
EGFRCR SERPLBLD CKD-EPI 2021: 55 ML/MIN/1.73M2 (ref 60–?)
ERYTHROCYTE [DISTWIDTH] IN BLOOD BY AUTOMATED COUNT: 19.5 % (ref 11–15)
GLUCOSE BLD-MCNC: 156 MG/DL (ref 70–99)
GLUCOSE BLD-MCNC: 168 MG/DL (ref 70–99)
GLUCOSE BLDC GLUCOMTR-MCNC: 138 MG/DL (ref 70–99)
GLUCOSE BLDC GLUCOMTR-MCNC: 141 MG/DL (ref 70–99)
GLUCOSE BLDC GLUCOMTR-MCNC: 145 MG/DL (ref 70–99)
GLUCOSE BLDC GLUCOMTR-MCNC: 148 MG/DL (ref 70–99)
GLUCOSE BLDC GLUCOMTR-MCNC: 159 MG/DL (ref 70–99)
HCT VFR BLD AUTO: 26.2 %
HGB BLD-MCNC: 8.5 G/DL
MAGNESIUM SERPL-MCNC: 2 MG/DL (ref 1.6–2.6)
MCH RBC QN AUTO: 27.2 PG (ref 26–34)
MCHC RBC AUTO-ENTMCNC: 32.4 G/DL (ref 31–37)
MCV RBC AUTO: 84 FL
OSMOLALITY SERPL CALC.SUM OF ELEC: 298 MOSM/KG (ref 275–295)
OSMOLALITY SERPL CALC.SUM OF ELEC: 299 MOSM/KG (ref 275–295)
PHOSPHATE SERPL-MCNC: 3.8 MG/DL (ref 2.4–5.1)
PLATELET # BLD AUTO: 222 10(3)UL (ref 150–450)
POTASSIUM SERPL-SCNC: 4.4 MMOL/L (ref 3.5–5.1)
POTASSIUM SERPL-SCNC: 4.5 MMOL/L (ref 3.5–5.1)
RBC # BLD AUTO: 3.12 X10(6)UL
SODIUM SERPL-SCNC: 140 MMOL/L (ref 136–145)
SODIUM SERPL-SCNC: 141 MMOL/L (ref 136–145)
WBC # BLD AUTO: 9.8 X10(3) UL (ref 4–11)

## 2024-12-16 PROCEDURE — 99233 SBSQ HOSP IP/OBS HIGH 50: CPT | Performed by: INTERNAL MEDICINE

## 2024-12-16 PROCEDURE — 99291 CRITICAL CARE FIRST HOUR: CPT | Performed by: INTERNAL MEDICINE

## 2024-12-16 PROCEDURE — 71045 X-RAY EXAM CHEST 1 VIEW: CPT | Performed by: INTERNAL MEDICINE

## 2024-12-16 PROCEDURE — 99232 SBSQ HOSP IP/OBS MODERATE 35: CPT | Performed by: INTERNAL MEDICINE

## 2024-12-16 PROCEDURE — 74018 RADEX ABDOMEN 1 VIEW: CPT | Performed by: NURSE PRACTITIONER

## 2024-12-16 PROCEDURE — 99233 SBSQ HOSP IP/OBS HIGH 50: CPT | Performed by: OTHER

## 2024-12-16 RX ORDER — SODIUM BICARBONATE 650 MG/1
325 TABLET ORAL AS NEEDED
Status: DISCONTINUED | OUTPATIENT
Start: 2024-12-16 | End: 2024-12-23

## 2024-12-16 RX ORDER — FUROSEMIDE 10 MG/ML
40 INJECTION INTRAMUSCULAR; INTRAVENOUS
Status: DISCONTINUED | OUTPATIENT
Start: 2024-12-16 | End: 2024-12-23

## 2024-12-16 NOTE — PROGRESS NOTES
[facility]     Gastroenterology Progress Note    Alisha Wilde Patient Status:  Inpatient    10/25/1965 MRN [id number]   Location [facility] 2W/SW Attending Missy Paulson MD   Hosp Day # 24 PCP Bridger Avendano MD       Subjective:    Had another large bm this morning  She denies abdominal pain.  Objective:   Blood pressure 125/58, pulse 75, temperature 98.4 °F (36.9 °C), temperature source Axillary, resp. rate 19, height 5' 5\" (1.651 m), weight (!) 309 lb 1.4 oz (140.2 kg), SpO2 100%, not currently breastfeeding. Body mass index is 51.43 kg/m².    Gen:awake, no acute distress  HEENT: mucus membranes appear moist, trach to vent  CV: RRR  Lung: mechanical breath sounds  Abdomen: soft NT, seems less distended today, peg luq  Skin: dry, warm, no jaundice  Ext: +edema  Neuro: appropriate    Assessment and Plan:   Alisha Wilde is a 59 year old female w/ PMHx of hypertension, GERD, CKD, hyperlipidemia who presented to ER 2024 for chest pain. Underwent emergent repair of aortic type A dissection on 2024. Inability to wean from the ventillator now s/p trach and PEG placement -.       # Prolonged sb ileus, improved  -s/p PEG   -CT abdomen pelvis 12/10 with dilated fluid-filled loops of small bowel, no transition point  -output from PEG appears to have slowed down   -KUB this morning shows no further dilation of sb which is an improvement  -consider restarting trickle feeds and ok to use peg for meds  -remains on TPN  -Avoid dysmotility agents  -appreciate surgery input    Toma Barrientos MD      Results:     Lab Results   Component Value Date    WBC 9.8 2024    HGB 8.5 (L) 2024    HCT 26.2 (L) 2024    .0 2024    CREATSERUM 1.14 (H) 2024    BUN 24 (H) 2024     2024    K 4.4 2024     2024    CO2 24.0 2024     (H) 2024    CA 8.7 2024    ALB 3.2 12/15/2024    ALKPHO 68 12/15/2024     BILT 0.6 12/15/2024    TP 5.7 12/15/2024    AST 25 12/15/2024    ALT 23 12/15/2024    PTT 30.7 12/04/2024    INR 1.17 12/04/2024    TSH 2.085 12/07/2024    NATHALIE 99 11/25/2024    LIP 28 11/25/2024    DDIMER 0.42 12/08/2019    ESRML 15 06/05/2021    MG 2.0 12/16/2024    PHOS 3.8 12/16/2024    TROP <0.045 12/08/2019     (H) 09/23/2021    B12 1,156 (H) 07/23/2018       XR CHEST AP PORTABLE  (CPT=71045)    Result Date: 12/16/2024  CONCLUSION: Right basal pulmonary opacity suggesting pneumonia.  No significant interval change since most recent exam from 12/15/24.  Findings are worse since 12/12/24.    Dictated by (CST): Jagjit Jin MD on 12/16/2024 at 10:19 AM     Finalized by (CST): Jagjit Jin MD on 12/16/2024 at 10:20 AM          XR ABDOMEN (1 VIEW) (CPT=74018)    Result Date: 12/16/2024  CONCLUSION: Nonspecific-nonobstructive bowel gas pattern.  Dilated bowel loops demonstrated on preceding exam is not redemonstrated.    Dictated by (CST): Jagjit Jin MD on 12/16/2024 at 10:16 AM     Finalized by (CST): Jagjit Jin MD on 12/16/2024 at 10:18 AM          XR CHEST AP PORTABLE  (CPT=71045)    Result Date: 12/15/2024  CONCLUSION:   Right greater than left patchy bilateral lung opacity which may be mildly increased in the right lung base.    Dictated by (CST): Javid Watts MD on 12/15/2024 at 7:23 AM     Finalized by (CST): Javid Watts MD on 12/15/2024 at 7:24 AM

## 2024-12-16 NOTE — PLAN OF CARE
Problem: RESPIRATORY - ADULT  Goal: Achieves optimal ventilation and oxygenation  Description: INTERVENTIONS:  - Assess for changes in respiratory status  - Assess for changes in mentation and behavior  - Position to facilitate oxygenation and minimize respiratory effort  - Oxygen supplementation based on oxygen saturation or ABGs  - Provide Smoking Cessation handout, if applicable  - Encourage broncho-pulmonary hygiene including cough, deep breathe, Incentive Spirometry  - Assess the need for suctioning and perform as needed  - Assess and instruct to report SOB or any respiratory difficulty  - Respiratory Therapy support as indicated  - Manage/alleviate anxiety  - Monitor for signs/symptoms of CO2 retention  Outcome: Progressing     Pt received with tracheostomy Portex 8.0, on full vent support. Pt is stable and saturating appropriately, suction as needed, trach care provided. No changes at this time, RT will continue to monitor.    Vent settings and readings as follow:     12/16/24 0001   Vent Information   $ Vent Care / Non-Invasive Subsequent Day Yes   Is this patient on Chronic Ventilation? Yes   Interface Invasive   Vent Type AV   Vent plugged into main power? Yes   Vent ID    Vent Mode PRVC/AC   Settings   FiO2 (%) 30 %   Resp Rate (Set) 18   Vt (Set, mL) 550 mL   Waveform Decelerating ramp   PEEP/CPAP (cm H2O) 5 cm H20   Insp Time (sec) 1 sec   Trigger Sensitivity Flow (L/min) 1 L/min   Trigger Sensitivity Pressure (cm H2O) 3 cm H2O   Humidification Heater   H2O Bag Level 1/4 Full   Heater Temperature 99.1 °F (37.3 °C)   Readings   Total RR 18   Minute Ventilation (L/min) 10.4 L/min   Expiratory Tidal Volume 550 mL   PIP Observed (cm H2O) 32 cm H2O   MAP (cm H2O) 13   PEEP Low (cm H2O) 2 cm H2O   I/E Ratio 1:2.3   Plateau Pressure (cm H2O) 29 cm H2O   Static Compliance (L/cm H2O) 24   Dynamic Compliance (L/cm H2O) 21 L/cm H2O   Alarms   High RR 50   Insp Pressure High (cm H2O) 50 cm H2O   Insp  Pressure Low (cm H2O) 8 cm H2O   MV High (L/min) 20 L/min   MV Low (L/min) 3 L/min   Apnea Interval (sec) 20 seconds      12/16/24 0001   Respiratory Assessment   Assessment Type Assess only   Respiratory Pattern Regular   Chest Assessment Chest expansion symmetrical   Surgical Airway Portex 8 mm Cuffed   Placement Date/Time: 12/04/24 0826   Placed By: Surgeon  Brand: Portex  Airway size (mm): 8 mm  Style: Cuffed   Status Secured   Site Assessment Clean;Dry   Cuff Pressure (cm H2O) 40 cm H2O   Suctioned? N   Ties Assessment Intact;Secure   Req'd equipment at bedside Trach tube;Bag mask   Additional Assessments   Pulse 74   Resp 18   SpO2 100 %

## 2024-12-16 NOTE — PLAN OF CARE
Problem: Diabetes/Glucose Control  Goal: Glucose maintained within prescribed range  Description: INTERVENTIONS:  - Monitor Blood Glucose as ordered  - Assess for signs and symptoms of hyperglycemia and hypoglycemia  - Administer ordered medications to maintain glucose within target range  - Assess barriers to adequate nutritional intake and initiate nutrition consult as needed  - Instruct patient on self management of diabetes  Outcome: Progressing     Problem: METABOLIC/FLUID AND ELECTROLYTES - ADULT  Goal: Glucose maintained within prescribed range  Description: INTERVENTIONS:  - Monitor Blood Glucose as ordered  - Assess for signs and symptoms of hyperglycemia and hypoglycemia  - Administer ordered medications to maintain glucose within target range  - Assess barriers to adequate nutritional intake and initiate nutrition consult as needed  - Instruct patient on self management of diabetes  Outcome: Progressing     Problem: GASTROINTESTINAL - ADULT  Goal: Minimal or absence of nausea and vomiting  Description: INTERVENTIONS:  - Maintain adequate hydration with IV or PO as ordered and tolerated  - Nasogastric tube to low intermittent suction as ordered  - Evaluate effectiveness of ordered antiemetic medications  - Provide nonpharmacologic comfort measures as appropriate  - Advance diet as tolerated, if ordered  - Obtain nutritional consult as needed  - Evaluate fluid balance  Outcome: Not Progressing  Goal: Maintains or returns to baseline bowel function  Description: INTERVENTIONS:  - Assess bowel function  - Maintain adequate hydration with IV or PO as ordered and tolerated  - Evaluate effectiveness of GI medications  - Encourage mobilization and activity  - Obtain nutritional consult as needed  - Establish a toileting routine/schedule  - Consider collaborating with pharmacy to review patient's medication profile  Outcome: Not Progressing     Problem: HEMATOLOGIC - ADULT  Goal: Maintains hematologic  stability  Description: INTERVENTIONS  - Assess for signs and symptoms of bleeding or hemorrhage  - Monitor labs and vital signs for trends  - Administer supportive blood products/factors, fluids and medications as ordered and appropriate  - Administer supportive blood products/factors as ordered and appropriate  Outcome: Not Progressing

## 2024-12-16 NOTE — DIETARY NOTE
ADULT NUTRITION REASSESSMENT    Pt is at high nutrition risk.  Pt does not meet malnutrition criteria.      RECOMMENDATIONS TO MD: See Nutrition Intervention for PN specifics . KUB today shows no further dilation of SB (improvement) per MD chart notes. Received consult to initiate trickle feeds today. Discussed with Dr. Barrientos via [employer]. Will continue TPN until tolerance of EN is established. See below for rx.     ADMITTING DIAGNOSIS:  Dissection of ascending aorta (HCC)  PERTINENT PAST MEDICAL HISTORY:   Past Medical History:    Chronic kidney disease (CKD)    Esophageal reflux    High blood pressure    High cholesterol    HYPERTENSION    Hypertension    Unspecified essential hypertension     PATIENT STATUS: Initial 11/27/24: RD received consult to initiate tube feedings. Pt is POD #5 emergent aortic dissection and repair. Pt has been intubated, unable to extubate failing SBT's. NPO x 6 days today. Well-nourished. No weight loss, actually some gain since admission, likely r/t post op fluid changes. On Lasix. No pressors. Not at significant risk for refeeding syndrome.   1127:  -Attempted to discuss with CYNDIE Milan via phone call. RN states Propofol is current running at 45 mcg/kg/min (31.6ml/hr providing 835 kcal from lipids).   -RN states IV fluids currently running: D5/NaCl 0.45%. States current rate in chart is accurate (40 ml/hr). IVF status has not been updated in active meds nor in flowsheets since 11/25 @ 0600.   -RN states pt continues on insulin drip per protocol \"while pt is intubated.\"   -Sent secure chat to APN today to confirm if IVF/insulin drip will continue once tube feedings are started. Awaiting response.     Update 12/2/24: Pt cont to require full vent support. Failing SBTs. Will need trach and PEG this week per MD chart notes. Receiving high dose propofol  at 50mcg/kg/min. Blood sugars well-controlled. Non DM, off insulin gtt. Pt having thick secretions requiring significant amount of  suction. Having BM's, last on 11/30. Now with new open keloid wound to right breast. Seen by wound care RN. Cont TF, tolerating at goal rate with modular protein added BID.     Update 12/5/24: Pt had new trach placed yesterday, doing well. Restraints off during the day. PEG placed this AM by Dr. Barrientos. Plans to resume Gtube feedings if safe after 24 hours following placement. Will adjust TF order as appropriate tomorrow in anticipation of resuming via Gtube. Monitor propofol titration/dc. On Reglan. May need to utilize renal formula until phos comes down WNL.     Update 12/10/24: TF was stopped yesterday. Per MD/RN chart notes, pt with high residuals, episode of emesis, and now fecal-like matter found around trach opening. Was cleaned and replaced by ENT. Pt has been having BM's, appropriate stool consistency for being tube fed. Had KUB done, no ileus or bowel obstruction, but large stool burden seen. On bowel regimen. Will avoid/limit narcotics. Continuing to hold TF for now. Monitor for nutrition plan.     Early Reassessment 12/12/24: New consult to initiate TPN tonight. Now with ileus. Feculent output from trach, nose, and TF material in mouth when suctioning. +Emesis with aspiration. TF have been on hold x 3 days. Ordered appropriate labs to review prior to TPN start. No IVF. Trying to wean off sedation, Propofol currently off since 0845 today. Still on Precedex. Double lumen PICC line in place to right basilic and able to be used for TPN per RN.     Update 12/16/24: Pt is on 4th bag of TPN today, with 5th bag planned to start tonight. Will keep macro nutrition the same and start trickle feeds today as well. Pt remains extremely fluid overloaded, diuretic will be increased per Nephro MD notes. Urine output has been good. Remains with PEG tube to LIS with 500 ml output recorded yesterday, but one shift not charted, so suspect overall output is higher. Having BM's last few days.     FOOD/NUTRITION RELATED  HISTORY:  Appetite: NPO  Intake: NPO  Intake Meeting Needs: NPO and TPN + TF will meet needs .   Percent Meals Eaten (last 6 days)       Date/Time Percent Meals Eaten (%)    12/14/24 2200 0 %            Food Allergies: No Known Food Allergies (NKFA)  Cultural/Ethnic/Samaritan Preferences: Not Obtained    GASTROINTESTINAL:  +BM 12/15 (large), and BM today (extra large). On bowel regimen, PEG tube to LIS.  UOP good - 12/15: 3700 ml    Fluid status net +6252 ml since 11/28,   Today's fluid status +710 ml  PEG output: 12/15- 500ml, but AM shift with no documentation, so likely higher output.     MEDICATIONS: reviewed; abx providing total of 300 ml fluid daily   adult 3 in 1 TPN 91.7 mL/hr at 12/15/24 2108    labetalol (Trandate) 400 mg in sodium chloride 0.9% 200 mL infusion 0.5 mg/min (12/16/24 0445)    dextrose 10%      dexmedetomidine 0.4 mcg/kg/hr (12/16/24 0927)    niCARdipine 15 mg/hr (12/16/24 0615)      propofol        pantoprazole  40 mg Intravenous Daily    furosemide  40 mg Intravenous BID (Diuretic)    LORazepam  0.5 mg Intravenous QID    cloNIDine  1 patch Transdermal Weekly    piperacillin-tazobactam  3.375 g Intravenous Q8H    carvedilol  37.5 mg Oral BID with meals    aspirin  81 mg Peg Tube Daily    isosorbide dinitrate  40 mg Per NG Tube TID (Nitrates)    hydrALAZINE  150 mg Oral Q8H GABRIEL    amLODIPine  10 mg Oral Daily    heparin  5,000 Units Subcutaneous Q8H GABRIEL    chlorhexidine gluconate  15 mL Mouth/Throat BID     LABS: reviewed; lytes are WNL  Recent Labs     12/14/24  0631 12/15/24  0445 12/16/24  0508   * 160*  160* 156*   BUN 26* 27*  27* 24*   CREATSERUM 1.28* 1.24*  1.24* 1.14*   CA 8.4* 8.5*  8.5* 8.7   MG 2.2 2.1 2.0   * 146*  146* 141   K 4.2  4.2 4.6  4.6 4.4   * 116*  116* 111   CO2 22.0 23.0  23.0 24.0   PHOS 3.0 4.0 3.8   OSMOCALC 314* 311*  311* 299*     NUTRITION RELATED PHYSICAL FINDINGS:  - Nutrition Focused Physical Exam (NFPE): well nourished  - Fluid  Accumulation: non-pitting generalized ---> See RN documentation for details  - Skin Integrity: surgical wound(s) and other wounds noted ---> See RN documentation for details    ANTHROPOMETRICS:  HT: 165.1 cm (5' 5\")  WT: (!) 140.2 kg (309 lb 1.4 oz) - Bed scale needs to be re-zeroed, current wt likely inaccurate. Remains fluid overloaded with net positive fluid status.   BMI: Body mass index is 51.43 kg/m².  BMI CLASSIFICATION: 35-39.9 kg/m2 - obesity class II  IBW: 115 lbs        246% IBW  Usual Body Wt: ~249 lbs in the past per EMR        115% UBW  Dosing Wt: 249# (113 kg) taken on 5/17- utilized for energy calculations.     WEIGHT HISTORY:  Patient Weight(s) for the past 336 hrs:   Weight   12/13/24 0500 (!) 140.2 kg (309 lb 1.4 oz)   12/12/24 0530 106.8 kg (235 lb 7.2 oz)   12/11/24 0400 130.6 kg (287 lb 14.7 oz)   12/09/24 0540 135 kg (297 lb 9.9 oz)   12/08/24 1500 103.6 kg (228 lb 4.8 oz)   12/07/24 0400 131 kg (288 lb 12.8 oz)   12/06/24 0551 129.4 kg (285 lb 4.4 oz)   12/05/24 0426 128 kg (282 lb 3 oz)   12/04/24 0322 132 kg (291 lb 0.1 oz)   12/03/24 0300 129.5 kg (285 lb 7.9 oz)     Wt Readings from Last 10 Encounters:   12/13/24 (!) 140.2 kg (309 lb 1.4 oz)   10/24/24 115.2 kg (254 lb)   05/17/24 112.9 kg (249 lb)   02/22/24 112.9 kg (249 lb)   12/06/23 112.9 kg (249 lb)   10/09/23 112.5 kg (248 lb)   09/25/23 111.1 kg (245 lb)   08/10/23 111.7 kg (246 lb 4.1 oz)   08/09/23 111.7 kg (246 lb 4.8 oz)   03/25/23 109.3 kg (241 lb)     NUTRITION DIAGNOSIS/PROBLEM:   Inadequate oral intake related to Decreased ability to consume sufficient energy as evidenced by NPO status and no PO intake during admission, and intubation.    Progress:   No improvement, continue - On TPN and starting trickle feeds today.     NUTRITION INTERVENTION:     NUTRITION PRESCRIPTION:   Estimated Nutrition needs: --dosing wt of 113 kg - wt taken on 5/17 - likely close to dry/usual weight  Calories: 5034-3369 calories/day (15-17  calories per kg Dosing wt)  [place]: 1894 kcal (~16 kcal/kg dosing wt) recalculated on 12/16  Protein: 62- 104g protein/day (1.2-2 g protein/kg Ideal body wt (IBW))  Fluid Needs: 1ml/kcal or 25ml/kg adjusted weight for obesity= 1900 ml (76 kg adjusted IBW)    - Diet:       Procedures    NPO     -Parenteral Nutrition: 2000 ml volume. 80g protein (320 kcal), 205g dextrose (700 kcal), and 45g lipids (450 kcal). Provides a total of 1470 kcal and meets ~78% of estimated energy needs and 100% of estimated protein needs.     - Enteral Nutrition (Trickle feeds per MD): Jevity 1.2 at 15ml/hr (do not advance) via Gtube. Based on average 22 hour infusion time. Current rate provides 396 kcal, 18 grams protein, 267ml water. Water flushes 30ml q 4 hours (180ml).  Total fluid daily = 447 ml    Combination of TPN + TF = 1866 kcals (98% of estimated needs), 98g protein (100% of estimated needs), and a total fluid volume of 2447 ml daily. Will monitor need to begin weaning TPN to transition to EN.       - RD Care Plan:  initiate Trickle feeds, cont TPN until tolerance is established.  - Medical Food Supplements- NPO. Rational/use of oral supplements discussed.  - Vitamin and mineral supplements: none  - Feeding assistance: NPO  - Nutrition education: not appropriate     - Coordination of nutrition care: collaboration with other providers  - Discharge and transfer of nutrition care to new setting or provider: monitor plans    MONITOR AND EVALUATE/NUTRITION GOALS:  - Food and Nutrient Intake:      Monitor: N/A  - Food and Nutrient Administration:      Monitor: resumption of enteral nutrition, TPN initiation, TPN tolerance, and propofol rate  - Anthropometric Measurement:    Monitor weight  - Nutrition Goals:      allow wt loss due to fluid losses, long term gradual wt loss, labs within acceptable limits, monitor fluid status, improved GI status, and TPN + TF to meet >80% of needs.    DIETITIAN FOLLOW UP: RD to follow and monitor  nutrition status       Stefany Argueta RD, LDN  Clinical Dietitian  P: [phone]

## 2024-12-16 NOTE — PROGRESS NOTES
[facility]  part of [facility]    Progress Note    Alisha Wilde Patient Status:  Inpatient    10/25/1965 MRN [id number]   Location [facility] 2W/SW Attending Jessika Jimenes MD   Hosp Day # 24 PCP Bridger Avendano MD     Subjective:  Pt awake/alert during initial assessment this morning. She has Trach in place on full vent support/on low dose Precedex. She mouthed \"what happened to me?\" She is calm and able to follow simple commands. Nods head to simple questions. Denies pain and SOB.     Called into room approx 30 minutes after assessment due to bradycardia (30s): upon arrival to room also noted desats (60s). Emesis episode noted around Trach likely causing this event. Bedside RN and RT at bedside. Ambu bagged pt with immediate improvement in VS/O2 sats/pt stable. Pt alert during brief episode. Contact Critical Care MD who arrived at bedside. Abd X-ray & CXR ordered. PEG is to LIS. +BM noted    No visitors at bedside.     Objective:  /58 (BP Location: Left arm)   Pulse 75   Temp 98.4 °F (36.9 °C) (Axillary)   Resp 19   Ht 5' 5\" (1.651 m)   Wt (!) 309 lb 1.4 oz (140.2 kg)   SpO2 100%   BMI 51.43 kg/m²     Temp (24hrs), Av.3 °F (36.8 °C), Min:97.9 °F (36.6 °C), Max:98.5 °F (36.9 °C)      Intake/Output:    Intake/Output Summary (Last 24 hours) at 2024 0852  Last data filed at 2024 0600  Gross per 24 hour   Intake 4910 ml   Output 4200 ml   Net 710 ml       Wt Readings from Last 3 Encounters:   24 (!) 309 lb 1.4 oz (140.2 kg)   10/24/24 254 lb (115.2 kg)   24 249 lb (112.9 kg)       Allergies:  Allergies[1]    Labs:  Lab Results   Component Value Date    WBC 9.8 2024    HGB 8.5 2024    HCT 26.2 2024    .0 2024    CREATSERUM 1.14 2024    BUN 24 2024     2024    K 4.4 2024     2024    CO2 24.0 2024     2024    CA 8.7 2024    MG 2.0 2024    PHOS 3.8  12/16/2024       Physical Exam:  Blood pressure 125/58, pulse 75, temperature 98.4 °F (36.9 °C), temperature source Axillary, resp. rate 19, height 5' 5\" (1.651 m), weight (!) 309 lb 1.4 oz (140.2 kg), SpO2 100%, not currently breastfeeding.  Neck: Trach in place  Lungs: Coarse bilaterally   Heart: RRR, S1, S2  Abdomen: Slightly distended/Obese, NT, BS+x 4/+BM(s). PEG to LIS. Emesis this AM/+BM this AM  Extremities: Warm, dry, 1-2+ generalized UE & LE edema bilat  Pulses: 2+ bilat DP  Skin: sternotomy incision CATHIE=CDI-healing  Neurological: awake/calm/cooperative/nods head to simple questions/moves all ext/follow simple commands. Mouths words    Assessment/Plan:  S/P EMERGENT AORTIC DISSECTION REPAIR POD # 24  Respiratory Failure w/multiple failed SBT prior to trach placement -currently on full vent support-continue daily weaning trials w/mgt per Pulm. Re-intubated on 11/22 per Pulm after emesis episode/ETT removal due to emesis content noted in airway.  CXR pending  S/P Trach placement per ENT on 12/4 w/Trach changed 12/10   Completed ABX course for suspected aspiration pneumonia post op.  ABX re-started on 12/9 after emesis episode on 12/9: concern for aspiration   +BM (s) post op however pt w/episode of emesis on 12/9 w/stool content noted/+high PEG residuals. TF continue on hold. PEG to LIS. Pt w/intermittent episodes of emesis. Mgt per GI. Abd X-ray today.   Hx: HTN: on multiple antihypertensives including IV Cardene & IV Labetolol-weaning as able: SBP goal= <130/MAP >70. Mgt per Cards. Can wean once able to take meds through PEG.  New A-line placed 12/2  New PICC placed 12/2  Pain meds as needed-avoid/limit narcotics w/recent constipation/emesis episodes/dilated bowel loops on Abdominal X-ray on 12/12  Scds/Heparin SubQ prophylaxis DVT prevention. HIPA 11/25=negative. Plts continue >100,000  Continue ICU status  Hx: CKD. Expected post op volume overload w/mgt per Nephrology (consulted on 11/23) for NAIMA: CR  continues to improve. Limit/avoid nephrotoxic meds: Lasix per Nephrology. Aguilar remains for close I/O monitoring/immobility-new aguilar placed 12/9 then again 12/10 due to obstruction. Kidney US w/doppler on 12/8.   Expected post op anemia: w/o clinical significance/stable. May be slightly diluted due to volume overload.   Hx: Morbid obesity w/BMI >40-plan for RD to see when appropriate  Nutrition per TPN. PEG placed 12/5 per GI.  PEG to LIS. Mgt per GI.  Hx: ETOH abuse-monitor for withdrawals. CIWA protocol PRN   Psych consulted 12/12 to assist w/med mgt-follow recs  Discharge planning: pt lives alone and has supportive family.   Anticipate LTAC at discharge: referrals sent proactively by  on 11/29 for LTAC.      Plan of care discussed w/RN and CV Surgeon: Dr. Marysol Noel, APRN  12/16/2024  8:52 AM       [1]   Allergies  Allergen Reactions    Atorvastatin MYALGIA    Pravastatin MYALGIA    Rosuvastatin MYALGIA    Seasonal Runny nose

## 2024-12-16 NOTE — CM/SW NOTE
[facility] paperwork is available and will be addressed during stay per Mariela MURPHY.    CM sent updates in [facility].  Alisha is not medically stable for transfer to LTAC.    CM will continue to follow and assist with dc transition.    Zohra Melendez MBA BSN RN CRRN   RN Case Manager  [phone]

## 2024-12-16 NOTE — PROGRESS NOTES
[facility]  part of [facility]    Cardiology Progress Note    Alisha Wilde Patient Status:  Inpatient    10/25/1965 MRN [id number]   Location [facility] 2W/SW Attending Aguila Villegas MD   Hosp Day # 24 PCP Bridger Avendano MD     Interval History   -140/60s  Remains on Labetalol and Cardene gtt as his PEG tube remains in LIS, on TPN  Another aspiration event suspected after emesis around trach collar    Assessment and Plan:   Acute hypoxic respiratory failure  Multiple aspiration events, emesis  Suspected ileus  Type A aortic dissection  NAIMA on CKD-III, improved  Obesity  EtOH abuse  -presented with acute onset CP   -CT with type A aortic dissection above right coronary artery and extending distally into the left common iliac artery and external iliac   -s/p emergent successful repair on 24  -course complicated by failure to extubated resulting in Trach/PEG tube placement and multiple aspiration events associated with ileus     Hypertension  -TTE on  with hyperdynamic LVEF  -continue BP management - only on IV therapies as pt PEG tube to LIS due to recurrent emesis and aspiration events with underlying ileus   -holding coreg from 37.5 mg, clonidine  0.3 mg 3 times daily, combination hydralazine + isosorbide 150mg-40mg TID, amlodipine 10 mg daily   -continue nicardipine gtt   -continue labetalol gtt   -continue clonidine patch  -monitor rhythm on tele    Objective:   Patient Vitals for the past 24 hrs:   BP Temp Temp src Pulse Resp SpO2 Weight   24 1500 119/66 -- -- 70 18 92 % --   24 1400 124/66 -- -- 71 18 95 % --   24 1300 122/66 -- -- 68 18 97 % --   24 1200 124/68 98.4 °F (36.9 °C) Axillary 68 18 97 % --   24 1100 127/75 -- -- 69 18 98 % --   24 1000 129/71 -- -- 69 18 97 % --   24 0900 128/69 -- -- 70 18 97 % --   24 0800 (!) 143/118 97.7 °F (36.5 °C) Temporal 70 18 98 % --   24 0700 130/70 -- -- 69 18  99 % --   11/26/24 0621 -- -- -- -- -- -- 282 lb 3 oz (128 kg)   11/26/24 0600 95/53 -- -- 68 21 97 % --   11/26/24 0500 121/67 -- -- 70 18 97 % --   11/26/24 0400 119/68 98.4 °F (36.9 °C) Temporal 72 18 98 % --   11/26/24 0200 109/60 -- -- 72 21 96 % --   11/26/24 0130 -- -- -- 72 18 97 % --   11/26/24 0100 142/73 -- -- 71 18 98 % --   11/26/24 0030 -- -- -- 71 18 99 % --   11/26/24 0000 139/70 98.3 °F (36.8 °C) Temporal 70 18 98 % --   11/25/24 2300 130/69 -- -- 72 18 98 % --   11/25/24 2200 135/70 -- -- 71 18 98 % --   11/25/24 2100 138/71 -- -- 71 18 99 % --   11/25/24 2000 132/68 98.1 °F (36.7 °C) Temporal 69 18 99 % --   11/25/24 1900 124/64 -- -- 71 18 98 % --   11/25/24 1800 136/72 -- -- 71 18 98 % --   11/25/24 1700 138/70 -- -- 70 18 98 % --       Intake/Output:   Last 3 shifts:   Intake/Output                   11/24/24 0700 - 11/25/24 0659 11/25/24 0700 - 11/26/24 0659 11/26/24 0700 - 11/27/24 0659       Intake    P.O.  0  0  --    P.O. 0 0 --    I.V.  2276.7  1884.9  --    I.V. 154 615 --    Volume (mL) Insulin 53.9 37 --    Volume (mL) Nitroglycerin 236.9 54.5 --    Volume (mL) Dobutamine 4.5 -- --    Volume (mL) Propofol 477.8 713.9 --    Volume (mL) Dexmedetomidine 352.2 384.5 --    Volume (mL) (dextrose 5%-sodium chloride 0.45% infusion) 997.4 80 --    NG/GT  50  150  60    Intake (mL) (NG/OG Tube Orogastric 16 Fr. Right mouth) 50 150 60    IV PIGGYBACK  600  500  --    Volume (mL) (piperacillin-tazobactam (Zosyn) 3.375 g in dextrose 5% 100 mL IVPB-ADDV) 300 200 --    Volume (mL) (acetaminophen (Ofirmev) 10 mg/mL infusion premix 1,000 mg) 200 100 --    Volume (mL) (potassium chloride 20 mEq/100mL IVPB premix 20 mEq) -- 100 --    Volume (mL) (potassium chloride 40 mEq/100mL IVPB premix (central line) 40 mEq) 100 100 --    Total Intake 2926.7 2534.9 60       Output    Urine  1800  3570  800    Output (mL) (Urethral Catheter Latex;Temperature probe;Double-lumen) 1800 3570 800    Emesis/NG output  550   365  --    Residual volume (ml) (NG/OG Tube Orogastric 16 Fr. Right mouth) -- 5 --    Output (mL) (NG/OG Tube Orogastric 16 Fr. Right mouth) 550 360 --    Chest Tube  188  125  30    Output (mL) ([REMOVED] Chest Tube Anterior Mediastinal 32 Fr.) 48 50 --    Output (mL) (Chest Tube Anterior Pericardial 24 Fr.) 140 75 30    Total Output 2538 4060 830       Net I/O     388.7 -1525.1 -770             Vent Settings: Vent Mode: PRVC/AC  FiO2 (%):  [30 %] 30 %  S RR:  [18] 18  S VT:  [550 mL] 550 mL  PEEP/CPAP (cm H2O):  [5 cm H20] 5 cm H20  MAP (cm H2O):  [12-14] 14    Hemodynamic parameters (last 24 hours):      Scheduled Meds:    propofol        pantoprazole  40 mg Intravenous Daily    furosemide  40 mg Intravenous BID (Diuretic)    LORazepam  0.5 mg Intravenous QID    cloNIDine  1 patch Transdermal Weekly    piperacillin-tazobactam  3.375 g Intravenous Q8H    carvedilol  37.5 mg Oral BID with meals    aspirin  81 mg Peg Tube Daily    isosorbide dinitrate  40 mg Per NG Tube TID (Nitrates)    hydrALAZINE  150 mg Oral Q8H GABRIEL    amLODIPine  10 mg Oral Daily    heparin  5,000 Units Subcutaneous Q8H GABRIEL    chlorhexidine gluconate  15 mL Mouth/Throat BID       Continuous Infusions:    adult 3 in 1 TPN      dextrose 10%      adult 3 in 1 TPN 91.7 mL/hr at 12/15/24 2108    labetalol (Trandate) 400 mg in sodium chloride 0.9% 200 mL infusion 0.5 mg/min (12/16/24 1652)    dextrose 10%      dexmedetomidine 0.2 mcg/kg/hr (12/16/24 1600)    niCARdipine 15 mg/hr (12/16/24 1653)       Results:     Lab Results   Component Value Date    WBC 9.8 12/16/2024    HGB 8.5 (L) 12/16/2024    HCT 26.2 (L) 12/16/2024    .0 12/16/2024    CREATSERUM 1.19 (H) 12/16/2024    BUN 24 (H) 12/16/2024     12/16/2024    K 4.5 12/16/2024     12/16/2024    CO2 23.0 12/16/2024     (H) 12/16/2024    CA 8.6 (L) 12/16/2024    ALB 3.2 12/15/2024    ALKPHO 68 12/15/2024    BILT 0.6 12/15/2024    TP 5.7 12/15/2024    AST 25 12/15/2024     ALT 23 12/15/2024    PTT 30.7 12/04/2024    INR 1.17 12/04/2024    TSH 2.085 12/07/2024    NATHALIE 99 11/25/2024    LIP 28 11/25/2024    DDIMER 0.42 12/08/2019    ESRML 15 06/05/2021    MG 2.0 12/16/2024    PHOS 3.8 12/16/2024    TROP <0.045 12/08/2019     (H) 09/23/2021    B12 1,156 (H) 07/23/2018       Recent Labs   Lab 12/15/24  0445 12/16/24  0508 12/16/24  1129   *  160* 156* 168*   BUN 27*  27* 24* 24*   CREATSERUM 1.24*  1.24* 1.14* 1.19*   CA 8.5*  8.5* 8.7 8.6*   *  146* 141 140   K 4.6  4.6 4.4 4.5   *  116* 111 110   CO2 23.0  23.0 24.0 23.0     Recent Labs   Lab 12/12/24  0443 12/13/24  0904 12/14/24  0631 12/14/24  1542 12/15/24  0445 12/16/24  0508   RBC 2.95*   < > 2.73*  --  2.98* 3.12*   HGB 8.3*   < > 7.4* 8.0* 8.0* 8.5*   HCT 25.3*   < > 23.6* 24.7* 25.2* 26.2*   MCV 85.8   < > 86.4  --  84.6 84.0   MCH 28.1   < > 27.1  --  26.8 27.2   MCHC 32.8   < > 31.4  --  31.7 32.4   RDW 16.8*   < > 17.1*  --  20.2* 19.5*   NEPRELIM 3.64  --   --   --   --   --    WBC 6.5   < > 8.4  --  7.5 9.8   .0   < > 254.0  --  216.0 222.0    < > = values in this interval not displayed.       No results for input(s): \"BNPML\" in the last 168 hours.    No results for input(s): \"TROP\", \"CK\" in the last 168 hours.    XR CHEST AP PORTABLE  (CPT=71045)    Result Date: 12/16/2024  CONCLUSION: Right basal pulmonary opacity suggesting pneumonia.  No significant interval change since most recent exam from 12/15/24.  Findings are worse since 12/12/24.    Dictated by (CST): Jagjit Jin MD on 12/16/2024 at 10:19 AM     Finalized by (CST): Jagjit Jin MD on 12/16/2024 at 10:20 AM          XR ABDOMEN (1 VIEW) (CPT=74018)    Result Date: 12/16/2024  CONCLUSION: Nonspecific-nonobstructive bowel gas pattern.  Dilated bowel loops demonstrated on preceding exam is not redemonstrated.    Dictated by (CST): Jagjit Jin MD on 12/16/2024 at 10:16 AM     Finalized by (CST): Jagjit Jin MD on 12/16/2024 at  10:18 AM          XR CHEST AP PORTABLE  (CPT=71045)    Result Date: 12/15/2024  CONCLUSION:   Right greater than left patchy bilateral lung opacity which may be mildly increased in the right lung base.    Dictated by (CST): Javid Watts MD on 12/15/2024 at 7:23 AM     Finalized by (CST): Javid Watts MD on 12/15/2024 at 7:24 AM          XR ABDOMEN (1 VIEW) (CPT=74018)    Result Date: 12/14/2024  CONCLUSION:  Significantly limited examination.  Ongoing dilation of small bowel loops up to 4.7 cm, previously 4.5 cm.  Recommend continued radiographic/clinical follow-up.    Dictated by (CST): Karen Ureña MD on 12/14/2024 at 11:02 AM     Finalized by (CST): Karen Ureña MD on 12/14/2024 at 11:04 AM                    Exam:     Physical Exam:  General: awake/alert  HEENT: +trach  Neck: No JVD, carotids 2+, no bruits.  Cardiac: Regular rate and rhythm. S1, S2 normal.   Lungs: +rhonchi   Abdomen: Soft, non-tender.BS-present.  Extremities: Without clubbing or cyanosis. + BLE edema.    Neurologic: unable to obtain  Skin: Warm and dry.     Edward Montgomery, [facility] [place]

## 2024-12-16 NOTE — PROGRESS NOTES
Pulmonary/ICU/Critical Care Progress Note        Reason for Consultation: post op care  Referring Physician: Dr. Gregg        SUBJECTIVE:  Vomited again this am   Became bradycardic and had to be bagged.      ALLERGIES:  Allergies[1]        MEDS:  Home Medications:  Medications Taking[2]    Scheduled Medication:   propofol        LORazepam  0.5 mg Intravenous QID    furosemide  40 mg Intravenous Daily    cloNIDine  1 patch Transdermal Weekly    piperacillin-tazobactam  3.375 g Intravenous Q8H    carvedilol  37.5 mg Oral BID with meals    aspirin  81 mg Peg Tube Daily    isosorbide dinitrate  40 mg Per NG Tube TID (Nitrates)    hydrALAZINE  150 mg Oral Q8H GABRIEL    amLODIPine  10 mg Oral Daily    heparin  5,000 Units Subcutaneous Q8H GABRIEL    chlorhexidine gluconate  15 mL Mouth/Throat BID    famotidine  20 mg Intravenous Daily     Continuous Infusing Medication:   adult 3 in 1 TPN 91.7 mL/hr at 12/15/24 2108    labetalol (Trandate) 400 mg in sodium chloride 0.9% 200 mL infusion 0.5 mg/min (12/16/24 0445)    dextrose 10%      dexmedetomidine 0.4 mcg/kg/hr (12/15/24 1518)    niCARdipine 15 mg/hr (12/16/24 0615)     PRN Medications:    propofol    LORazepam    haloperidol lactate    dextrose 10%    fentaNYL    fentaNYL    bisacodyl    acetaminophen    sodium chloride    albuterol    hydrALAzine    acetaminophen    ipratropium-albuterol    HYDROcodone-acetaminophen    melatonin    potassium chloride **OR** potassium chloride    magnesium sulfate in dextrose 5%    magnesium sulfate in sterile water for injection       PHYSICAL EXAM:  /58 (BP Location: Left arm)   Pulse 75   Temp 98.4 °F (36.9 °C) (Axillary)   Resp 19   Ht 5' 5\" (1.651 m)   Wt (!) 309 lb 1.4 oz (140.2 kg)   SpO2 100%   BMI 51.43 kg/m²   Vent Mode: PRVC/AC  FiO2 (%):  [30 %] 30 %  S RR:  [18] 18  S VT:  [550 mL] 550 mL  PEEP/CPAP (cm H2O):  [5 cm H20] 5 cm H20  MAP (cm H2O):  [12-13] 12  CONSTITUTIONAL: intubated awake just vomited  HEENT:  atraumatic normocephalic  MOUTH: MMM  NECK/THROAT: no JVD. Trachea midline. No obvious thyromegaly. + trach with min bleeding. + emesis in mouth and around trach  LUNG: coarse BS b/l no wheezing, + crackles. Diminished at bases. Chest symmetric with respiratory motion. + midsternal surgical wound healing   HEART: tachy but regular rate and rhythm, no obvious murmers or gallops noted  ABD: soft non distended not tender. + hypoactive bowel sounds. No organomegaly noted. + PEG tube to LIS  EXT: no clubbing, cyanosis, or edema noted. Pulses intact grossly      IMAGES:   CXR 12/15/24  On my read RLL infiltrates more dense but less pulm edema  CARDIAC/MEDIAST: Heart and mediastinum are unchanged with previous median sternotomy.   LUNGS/PLEURA: Right greater left patchy bilateral lung opacity which may be mildly increased in the right lung base.  No significant pleural effusion.  No pneumothorax.   OTHER: Stable appearance of the osseous structures.  Tracheostomy tube and right PICC line also unchanged.     KUB 12/14/24  No change in dilation of SB  Significantly limited examination.   Ongoing dilation of small bowel loops up to 4.7 cm, previously 4.5 cm. Recommend continued radiographic/clinical follow-up.       CXR 12/12/24  CONCLUSION:   1. Cardiomegaly.  Tortuous aorta.   2. Bilateral mixed multifocal airspace consolidation with slight improved aeration left lung base.   3. Tracheostomy tube overlies the tracheal air column.  Right PICC line with the tip in the SVC.      KUB 12/12/24  CONCLUSION:   1. Nonspecific small bowel dilatation left mid abdomen and central abdomen measures up to 4.5 cm.   2. Mild stool scattered throughout the colon suggests constipation fecal retention.      CT A/P 12/10/24  CONCLUSION:   1. Fluid-filled loops of small bowel, which could reflect underlying infectious/inflammatory enteritis or malabsorption in the appropriate clinical setting. Bowel loops are mildly dilated without clear  transition point to suggest mechanical bowel   obstruction, although early or partial bowel obstruction could have a similar appearance.   2. Extensive distal colonic diverticulosis without CT evidence of acute complication.    3. Bladder distension despite Webb catheter placement.   4. Hepatomegaly with hepatic steatosis.   5. Diffuse anasarca.   6. Partially visualized postthoracotomy chest with possible postoperative seroma/hematoma of the anterior chest wall.   7. Bilateral pleural effusions and associated basilar atelectasis, with or without superimposed pneumonia.   8. Additional scattered patchy nodular opacities may reflect infectious process.    9. Low-density appearance of the intracardiac blood pool raises the possibility of underlying anemia. Correlate with hematologic parameters.   10. Lesser incidental findings as above.     CXR 12/9/24  CONCLUSION:   1. Cardiomegaly.  Atherosclerotic aneurysmal thoracic aorta   2. Tracheostomy tube overlies tracheal air column.   3.  Bilateral mixed alveolar and interstitial multifocal airspace opacification has progressed.        KUB 12/9/24  CONCLUSION:   No huang dilatation of the small bowel to suggest small bowel obstruction.   Large cecal stool burden which may indicate constipation. Mild stool burden throughout the remainder of the colon.     CXR 12/5/24  No significant change when compared to 12/04/2024.     CXR 12/4/24  CONCLUSION:   1. Mild cardiomegaly and minimally prominent pulmonary vascularity but no huang pulmonary edema.  ET tube and NG tubes have been removed.  Tracheostomy is now noted in the trachea extending to the level the clavicles.  No pneumothorax.   2. Persistent patchy bilateral upper lobe airspace disease right more than left suggesting persistent bilateral upper lobe pneumonia.  Correlate clinically and continued follow-up is advised.     CXR 12/2/24  FINDINGS:   POSITION: The patient is semi-erect and slightly rotated to the right.    DEVICES: There is an endotracheal tube terminating approximately 3.9 cm above the jennyfer.  A large-bore right internal jugular central venous vascular access sheath has tip projecting in the SVC. An enteric tube extends caudally beyond the field of view.   CARDIAC/VASC: The cardiomediastinal silhouette is accentuated by AP technique, but likely stably enlarged. There is mild tortuosity of the thoracic aorta with peripheral atherosclerotic vascular calcification. The pulmonary vascularity is within normal   limits.   MEDIAST/HAMLET: Surgical clips are present.   LUNGS/PLEURA: Elevation right hemidiaphragm is noted. Multifocal patchy airspace consolidation is demonstrated, slightly worse on the right side than left. Mild interstitial opacities are seen. Additional scattered reticular opacities may be atelectatic   in nature. No large pleural effusion or pneumothorax is evident.    BONES: Median sternotomy wires are demonstrated. Mild degenerative changes of the thoracic spine are apparent. There is no fracture or visible bony lesion.   OTHER: There may be surgical clips at the level of the thoracic inlet. Leads overlie the chest and obscure underlying detail     CXR 11/27/24  Moderate pulmonary edema, progressed since 11/26/2024.     CT c/a/p  CONCLUSION:  Low-lying ETT, 0.8 cm above the jennyfer   Multifocal bilateral pulmonary nodular consolidation s    CXR 11/24/24  FINDINGS:   CARDIAC/VASC: The cardiac silhouette is exaggerated by AP portable technique. There is stable central pulmonary venous congestion.   MEDIAST/HAMLET:   No visible mass or adenopathy.   LUNGS/PLEURA: There are stable streaky opacities at the right lung base.  No pleural effusion or pneumothorax.   BONES: Sternotomy changes are again noted.   OTHER: An endotracheal tube tip projects 3.8 cm above the jennyfer.  An enteric tube again courses into the left upper quadrant.  A right internal jugular approach [place]-Dev catheter tip again projects over the  pulmonary outflow tract.  A mediastinal drain tip again projects over the right suprahilar region.     CXR 11/23/24  On my read possible RML infiltrates  CONCLUSION: Post emergent acute type A aortic dissection repair.  Stable cardiomegaly.  Gross stable/satisfactory position of lines and tubes.  Mild pulmonary vascular congestion.       CXR 11/22/24  CONCLUSION: Post feeding tube placement with tip in the distal esophagus approximately 4 cm above the gastroesophageal junction.  Recommend advancement of the tube by approximately 10 cm to place it in the stomach.     CXR 11/22/24  CARDIAC/MEDIASTINUM: The cardiac silhouette is enlarged and unchanged.. There is a right internal jugular [place]-Dev catheter with tip at the level of the main pulmonary artery. There is a right-sided chest tube. Endotracheal tube with tip approximately    5.3 cm above the jennyfer. There is a nasogastric tube with tip and sidehole within the stomach and below the diaphragm. There are median sternotomy wires and postoperative changes of ascending aortic repair.   LUNGS: There is pulmonary vascular redistribution without edema. Minimal blunting of the bilateral costophrenic angles may be secondary to trace pleural effusions versus chronic pleural thickening. There is mild bibasilar atelectasis. No pneumothorax.   BONES: There is degenerative disease of the thoracic spine.     Chest CTA 11/22/24  CONCLUSION:   1. Type a aortic dissection beginning just above right renal (correction:  Right coronary)  artery and extending distally into the left common iliac artery and external iliac.  Findings were called to Dr. Echavarria at 5:52 p.m.       LABS:  Recent Labs   Lab 12/12/24  0443 12/13/24  0904 12/14/24  0631 12/14/24  1542 12/15/24  0445 12/16/24  0508   RBC 2.95*   < > 2.73*  --  2.98* 3.12*   HGB 8.3*   < > 7.4* 8.0* 8.0* 8.5*   HCT 25.3*   < > 23.6* 24.7* 25.2* 26.2*   MCV 85.8   < > 86.4  --  84.6 84.0   MCH 28.1   < > 27.1  --  26.8 27.2    MCHC 32.8   < > 31.4  --  31.7 32.4   RDW 16.8*   < > 17.1*  --  20.2* 19.5*   NEPRELIM 3.64  --   --   --   --   --    WBC 6.5   < > 8.4  --  7.5 9.8   .0   < > 254.0  --  216.0 222.0    < > = values in this interval not displayed.       Recent Labs   Lab 12/13/24  0612 12/14/24  0631 12/15/24  0445 12/16/24  0508   * 161* 160*  160* 156*   BUN 27* 26* 27*  27* 24*   CREATSERUM 1.28* 1.28* 1.24*  1.24* 1.14*   EGFRCR 48* 48* 50*  50* 55*   CA 8.4* 8.4* 8.5*  8.5* 8.7   ALB 3.3  --  3.2  --    * 148* 146*  146* 141   K 3.4*  3.4* 4.2  4.2 4.6  4.6 4.4   * 117* 116*  116* 111   CO2 21.0 22.0 23.0  23.0 24.0   ALKPHO 73  --  68  --    AST 15  --  25  --    ALT 16  --  23  --    BILT 0.5  --  0.6  --    TP 5.8  --  5.7  --        ASSESSMENT/PLAN:  Type A aortic dissection s/p repair now intubated in ICU  -on nicardipine/labetolol  -multiple oral antiHTN meds for BP being held d/t ileus/SBO  -s/p pRBC transfusion 12/14/24 for anemia. No obvious s/s bleeding    Post op acute respiratory failure  -complicated by extubation and reimergent intubation and significant emesis concerning for aspiration PNA vs pneumonitis  -started on zosyn-completed initial 10 day course  -checked ETT asp-candida likely contaminant  -failed multiple SBTs d/t increased RR, work of breathing, hypertension, hypoxemia, significant thick secretions  -remains MV dependent   -hx of likely TANYA and previous smoking hx. Has sign dense consolidations and atelectasis on chest CT  -s/p trach by ENT on 12/4/24 and PEG by GI on 12/5/24  -start trach/vent weaning as able-limited by sedation needs, BP, and GI issues  -PRN ABG and CXR  -saline nebs  -on propofol and precedex-having difficulty weaning as pt becomes agitated and hypertensive. Started seroquel 50 mg BID but holding since can't use PEG tube. Started fentanyl but this will contribute to ileus  -psych consult for assistance with sedation and agitation-defer to  their recs  -on scheduled clonidine patch in attempt to wean off precedex  -restarted on vanco/zosyn for recurrent aspiration PNA. Stop vanco  -SBT 12/15 for 90 min    Previous hx of smoking and ETOH  -continue duonebs PRN    NAIMA on CKD and metabolic acidosis  -likely from surgery, and acute issues  -monitor UO and renal labs  -renal following   -diuresis as per cardiology and renal    Abd pain  -s/p abd CT as above  -not tolerating TF now  -PEG tube to LIS  -aspirated again this am with emesis around trach in mouth  -GI and surgery following    Proph:  -DVT: hep sq    Dispo:  -full code  -SW/ to assist with LTAC placement    Critical care time 31 min independent of procedures      Thank you for the opportunity to care for Alisha Wilde.      SIDDHARTH Rainey DO, MPH  Pulmonary Critical Care Medicine  [facility]                         [1]   Allergies  Allergen Reactions    Atorvastatin MYALGIA    Pravastatin MYALGIA    Rosuvastatin MYALGIA    Seasonal Runny nose   [2]   Outpatient Medications Marked as Taking for the 11/21/24 encounter (Hospital Encounter)   Medication Sig Dispense Refill    Acetaminophen ER (TYLENOL 8 HOUR) 650 MG Oral Tab CR Take 2 tablets (1,300 mg total) by mouth daily as needed.      Calcium Carb-Cholecalciferol 600-10 MG-MCG Oral Tab Take 1 tablet by mouth daily.      metoprolol succinate ER 50 MG Oral Tablet 24 Hr Take 1 tablet (50 mg total) by mouth daily. 90 tablet 3    Losartan Potassium-HCTZ 100-12.5 MG Oral Tab Take 1 tablet by mouth daily. 90 tablet 3    Potassium Chloride ER 20 MEQ Oral Tab CR Take 1 tablet by mouth daily. 90 tablet 3    albuterol 108 (90 Base) MCG/ACT Inhalation Aero Soln Inhale 2 puffs into the lungs every 4 (four) hours as needed for Wheezing. 3 each 3    amLODIPine 10 MG Oral Tab Take 1 tablet (10 mg total) by mouth daily. 90 tablet 3    Ascorbic Acid (VITAMIN C) 1000 MG Oral Tab Take 1 tablet (1,000 mg total)  by mouth daily.      vitamin B-12 50 MCG Oral Tab Take 1 tablet (50 mcg total) by mouth daily.

## 2024-12-16 NOTE — PROGRESS NOTES
Progress Note     Alisha Wilde Patient Status:  Inpatient    10/25/1965 MRN [id number]   Location [facility] 2W/SW Attending Jessika Jimenes MD   Hosp Day # 24 PCP Bridger Avendano MD     Chief Complaint: patient presented with   Chief Complaint   Patient presents with    Chest Pain Angina       Subjective:   S: : trach, plan for possible NG tube by GI , PEG with stool therefore to LIS, weaning sedation   : pt is off propofol, on Precedex, continue to have ileus   : pt precedex is weaning, following simple commands   : pt had repeat KUB this am. Continue to have ileus, on IV drips for BP maintenance   12/15: had one BM yesterday, PEG tube drainage improved   : episode of emesis today, some bradycardia, family at bedside.     Review of Systems:   10 point ROS completed and was negative, except for pertinent positive and negatives stated in subjective.    Objective:   Vital signs:  Temp:  [97.8 °F (36.6 °C)-98.5 °F (36.9 °C)] 97.8 °F (36.6 °C)  Pulse:  [70-79] 70  Resp:  [17-21] 18  BP: (121-147)/(49-63) 147/63  SpO2:  [99 %-100 %] 100 %  FiO2 (%):  [30 %] 30 %    Wt Readings from Last 6 Encounters:   24 (!) 309 lb 1.4 oz (140.2 kg)   10/24/24 254 lb (115.2 kg)   24 249 lb (112.9 kg)   24 249 lb (112.9 kg)   23 249 lb (112.9 kg)   10/09/23 248 lb (112.5 kg)         Physical Exam:    General: No acute distress. , Tracheostomy in place,     , morbidly obese  Respiratory: Clear to auscultation bilaterally. No wheezes. No rhonchi.  Cardiovascular: S1, S2. Regular rate and rhythm. No murmurs, rubs or gallops.   Abdomen: Soft, nontender, nondistended.  Positive bowel sounds. No rebound or guarding.  Neurologic: No focal neurological deficits.   Musculoskeletal: Moves all extremities.  Extremities: No edema.    Results:   Diagnostic Data:      Labs:    Labs Last 24 Hours:   BMP     CBC    Other     Na 140 Cl 110 BUN 24 Glu 168   Hb 8.5   PTT - Procal -   K 4.5 CO2  23.0 Cr 1.19   WBC 9.8 >< .0  INR - CRP -   Renal Lytes Endo    Hct 26.2   Trop - D dim -   eGFR - Ca 8.6 POC Gluc  159    LFT   pBNP - Lactic -   eGFR AA - PO4 3.8 A1c -   AST - APk - Prot -  LDL -     Mg 2.0 TSH -   ALT - T miranda - Alb -        COVID-19 Lab Results    COVID-19  Lab Results   Component Value Date    COVID19 Not Detected 11/21/2024    COVID19 Not Detected 08/10/2023    COVID19 Not Detected 05/13/2022       Pro-Calcitonin  No results for input(s): \"PCT\" in the last 168 hours.    Cardiac  No results for input(s): \"TROP\", \"PBNP\" in the last 168 hours.    Creatinine Kinase  No results for input(s): \"CK\" in the last 168 hours.    Inflammatory Markers  No results for input(s): \"CRP\", \"NATALIA\", \"LDH\", \"DDIMER\" in the last 168 hours.    Imaging: Imaging data reviewed in Epic.    IMAGES:   CT A/P 12/10/24  CONCLUSION:   1. Fluid-filled loops of small bowel, which could reflect underlying infectious/inflammatory enteritis or malabsorption in the appropriate clinical setting. Bowel loops are mildly dilated without clear transition point to suggest mechanical bowel   obstruction, although early or partial bowel obstruction could have a similar appearance.   2. Extensive distal colonic diverticulosis without CT evidence of acute complication.    3. Bladder distension despite Webb catheter placement.   4. Hepatomegaly with hepatic steatosis.   5. Diffuse anasarca.   6. Partially visualized postthoracotomy chest with possible postoperative seroma/hematoma of the anterior chest wall.   7. Bilateral pleural effusions and associated basilar atelectasis, with or without superimposed pneumonia.   8. Additional scattered patchy nodular opacities may reflect infectious process.    9. Low-density appearance of the intracardiac blood pool raises the possibility of underlying anemia. Correlate with hematologic parameters.   10. Lesser incidental findings as above.      CXR 12/9/24  CONCLUSION:   1. Cardiomegaly.   Atherosclerotic aneurysmal thoracic aorta   2. Tracheostomy tube overlies tracheal air column.   3.  Bilateral mixed alveolar and interstitial multifocal airspace opacification has progressed.        KUB 12/9/24  CONCLUSION:   No huang dilatation of the small bowel to suggest small bowel obstruction.   Large cecal stool burden which may indicate constipation.  Mild stool burden throughout the remainder of the colon.      CXR 12/5/24  No significant change when compared to 12/04/2024.      CXR 12/4/24  CONCLUSION:   1. Mild cardiomegaly and minimally prominent pulmonary vascularity but no huang pulmonary edema.  ET tube and NG tubes have been removed.  Tracheostomy is now noted in the trachea extending to the level the clavicles.  No pneumothorax.   2. Persistent patchy bilateral upper lobe airspace disease right more than left suggesting persistent bilateral upper lobe pneumonia.  Correlate clinically and continued follow-up is advised.      CXR 12/2/24  FINDINGS:   POSITION: The patient is semi-erect and slightly rotated to the right.   DEVICES: There is an endotracheal tube terminating approximately 3.9 cm above the jennyfer.  A large-bore right internal jugular central venous vascular access sheath has tip projecting in the SVC. An enteric tube extends caudally beyond the field of view.   CARDIAC/VASC: The cardiomediastinal silhouette is accentuated by AP technique, but likely stably enlarged. There is mild tortuosity of the thoracic aorta with peripheral atherosclerotic vascular calcification. The pulmonary vascularity is within normal   limits.   MEDIAST/HAMLET: Surgical clips are present.   LUNGS/PLEURA: Elevation right hemidiaphragm is noted. Multifocal patchy airspace consolidation is demonstrated, slightly worse on the right side than left. Mild interstitial opacities are seen. Additional scattered reticular opacities may be atelectatic   in nature. No large pleural effusion or pneumothorax is evident.     BONES: Median sternotomy wires are demonstrated. Mild degenerative changes of the thoracic spine are apparent. There is no fracture or visible bony lesion.   OTHER: There may be surgical clips at the level of the thoracic inlet. Leads overlie the chest and obscure underlying detail      CXR 11/27/24  Moderate pulmonary edema, progressed since 11/26/2024.      CT c/a/p  CONCLUSION:  Low-lying ETT, 0.8 cm above the jennyfer   Multifocal bilateral pulmonary nodular consolidation s     CXR 11/24/24  FINDINGS:   CARDIAC/VASC: The cardiac silhouette is exaggerated by AP portable technique. There is stable central pulmonary venous congestion.   MEDIAST/HAMLET:   No visible mass or adenopathy.   LUNGS/PLEURA: There are stable streaky opacities at the right lung base.  No pleural effusion or pneumothorax.   BONES: Sternotomy changes are again noted.   OTHER: An endotracheal tube tip projects 3.8 cm above the jennyfer.  An enteric tube again courses into the left upper quadrant.  A right internal jugular approach Mansfield-Dev catheter tip again projects over the pulmonary outflow tract.  A mediastinal drain tip again projects over the right suprahilar region.      CXR 11/23/24  On my read possible RML infiltrates  CONCLUSION: Post emergent acute type A aortic dissection repair.  Stable cardiomegaly.  Gross stable/satisfactory position of lines and tubes.  Mild pulmonary vascular congestion.       CXR 11/22/24  CONCLUSION: Post feeding tube placement with tip in the distal esophagus approximately 4 cm above the gastroesophageal junction.  Recommend advancement of the tube by approximately 10 cm to place it in the stomach.      CXR 11/22/24  CARDIAC/MEDIASTINUM: The cardiac silhouette is enlarged and unchanged.. There is a right internal jugular Mansfield-Dev catheter with tip at the level of the main pulmonary artery. There is a right-sided chest tube. Endotracheal tube with tip approximately    5.3 cm above the jennyfer. There is a  nasogastric tube with tip and sidehole within the stomach and below the diaphragm. There are median sternotomy wires and postoperative changes of ascending aortic repair.   LUNGS: There is pulmonary vascular redistribution without edema. Minimal blunting of the bilateral costophrenic angles may be secondary to trace pleural effusions versus chronic pleural thickening. There is mild bibasilar atelectasis. No pneumothorax.   BONES: There is degenerative disease of the thoracic spine.      Chest CTA 11/22/24  CONCLUSION:   1. Type a aortic dissection beginning just above right renal (correction:  Right coronary)  artery and extending distally into the left common iliac artery and external iliac.  Findings were called to Dr. Echavarria at 5:52 p.m.    KUB 12/15  CONCLUSION:   Significantly limited examination.      Ongoing dilation of small bowel loops up to 4.7 cm, previously 4.5 cm.  Recommend continued radiographic/clinical follow-up.     Medications:    propofol        pantoprazole  40 mg Intravenous Daily    furosemide  40 mg Intravenous BID (Diuretic)    LORazepam  0.5 mg Intravenous QID    cloNIDine  1 patch Transdermal Weekly    piperacillin-tazobactam  3.375 g Intravenous Q8H    carvedilol  37.5 mg Oral BID with meals    aspirin  81 mg Peg Tube Daily    isosorbide dinitrate  40 mg Per NG Tube TID (Nitrates)    hydrALAZINE  150 mg Oral Q8H GABRIEL    amLODIPine  10 mg Oral Daily    heparin  5,000 Units Subcutaneous Q8H GABRIEL    chlorhexidine gluconate  15 mL Mouth/Throat BID       Assessment & Plan:   ASSESSMENT / PLAN:     Problem List Items Addressed This Visit          HCC Problems    Dissection of ascending aorta (HCC) - Primary    Relevant Medications    sodium chloride 0.9% infusion 1,000 mL (Completed)    prothrombin complex conc (human) (Kcentra) 1,563 Units in 60 mL infusion (Completed)    furosemide (Lasix) 10 mg/mL injection 20 mg (Completed)    heparin (Porcine) 5000 UNIT/ML injection 5,000 Units    amLODIPine  (Norvasc) tab 10 mg    amLODIPine (Norvasc) tab 5 mg (Completed)    sodium chloride 0.9% infusion (Completed)    hydrALAZINE (Apresoline) tab 50 mg (Completed)    furosemide (Lasix) 10 mg/mL injection 40 mg (Completed)    labetalol (Trandate) 5 mg/mL injection (Completed)    hydrALAZINE (Apresoline) tab 150 mg    isosorbide dinitrate (Isordil) tab 40 mg    sodium chloride 0.9% infusion (Completed)    furosemide (Lasix) 10 mg/mL injection 40 mg (Completed)    niCARdipine (carDENE) 125 mg in sodium chloride 0.9% 250 mL infusion    furosemide (Lasix) 10 mg/mL injection 40 mg (Completed)    cloNIDine (Catapres) tab 0.3 mg (Completed)    cloNIDine (Catapres) tab 0.3 mg (Completed)    aspirin chewable tab 81 mg    carvedilol (Coreg) tab 37.5 mg    hydrALAzine (Apresoline) 20 mg/mL injection 10 mg    dexmedeTOMIDine (Precedex) 800 mcg in sodium chloride 0.9% 100 mL infusion    furosemide (Lasix) 10 mg/mL injection 40 mg (Completed)    cloNIDine (Catapres) 0.3 MG/24HR patch 1 patch    furosemide (Lasix) 10 mg/mL injection 40 mg (Completed)    furosemide (Lasix) 10 mg/mL injection 40 mg (Completed)    labetalol (Trandate) 400 mg in sodium chloride 0.9% 200 mL infusion    sodium chloride 0.9% infusion (Completed)    pantoprazole (Protonix) 40 mg in sodium chloride 0.9% PF 10 mL IV push    furosemide (Lasix) 10 mg/mL injection 40 mg (Start on 12/16/2024  5:00 PM)     Other Visit Diagnoses       Aortic anomaly (HCC)        Relevant Medications    atropine 0.1 MG/ML injection 1 mg (Completed)    magnesium sulfate in dextrose 5% 1 g/100mL infusion premix 1 g    magnesium sulfate in sterile water for injection 2 g/50mL IVPB premix 2 g    furosemide (Lasix) 10 mg/mL injection 20 mg (Completed)    heparin (Porcine) 5000 UNIT/ML injection 5,000 Units    amLODIPine (Norvasc) tab 10 mg    amLODIPine (Norvasc) tab 5 mg (Completed)    hydrALAZINE (Apresoline) tab 50 mg (Completed)    furosemide (Lasix) 10 mg/mL injection 40 mg  (Completed)    labetalol (Trandate) 5 mg/mL injection (Completed)    hydrALAZINE (Apresoline) tab 150 mg    lidocaine PF (Xylocaine-MPF) 1% injection (Completed)    isosorbide dinitrate (Isordil) tab 40 mg    furosemide (Lasix) 10 mg/mL injection 40 mg (Completed)    niCARdipine (carDENE) 125 mg in sodium chloride 0.9% 250 mL infusion    ceFAZolin (Ancef) 3 g in sodium chloride 0.9% 100mL IVPB premix (Completed)    furosemide (Lasix) 10 mg/mL injection 40 mg (Completed)    cloNIDine (Catapres) tab 0.3 mg (Completed)    cloNIDine (Catapres) tab 0.3 mg (Completed)    aspirin chewable tab 81 mg    carvedilol (Coreg) tab 37.5 mg    hydrALAzine (Apresoline) 20 mg/mL injection 10 mg    furosemide (Lasix) 10 mg/mL injection 40 mg (Completed)    lidocaine (cardiac) (Xylocaine) 20 mg/mL injection (Completed)    atropine 0.1 MG/ML injection (Completed)    EPINEPHrine (Adrenalin) 1 MG/10ML (0.1 MG/ML) injection (Cardiac Arrest) (Completed)    cloNIDine (Catapres) 0.3 MG/24HR patch 1 patch    furosemide (Lasix) 10 mg/mL injection 40 mg (Completed)    furosemide (Lasix) 10 mg/mL injection 40 mg (Completed)    labetalol (Trandate) 400 mg in sodium chloride 0.9% 200 mL infusion    adult 3 in 1 TPN    furosemide (Lasix) 10 mg/mL injection 40 mg (Start on 12/16/2024  5:00 PM)    adult 3 in 1 TPN (Start on 12/16/2024  9:00 PM)    Other Relevant Orders    Specimen to Pathology Tissue (Completed)            58 y/o Morbid obesity, hypertension, gastroesophageal reflux disease, chronic kidney disease stage 2 to 3, and hyperlipidemia admitted on 11/21 for Chest pain -CT CT angiogram of the chest, abdomen, and pelvis rule out dissection showed type A aortic dissection s/p emergent repair 11/22/24 , transferred to ICU post op intubated on 11/22, failed SBT, s/p trach 12/4,  PEG 12/5, completed 10 days of abx zsecondary to aspiration event , no with ileus and Abx Vanc and Zosyn resumed for aspiration pneumonia           Ileus   -Gen surgery  and GI following   -rpt Abd xray results pending   -PEG tube to LIS   -possibly will need NG   -Continue IV abx   12/13-PEG tube drainage improved   12/14: Repeat KUB this am   12/15: KUB yesterday with on going  Small bowel Dilation         Type A aortic dissection   Ileus   S/p emergent repair 11/22/24   CT chest abdomen and pelvis shows bilateral pulmonary nodular consolidation- no focal fluid collection- completed IV antibiotics- continue to monitor off of them   On IV Reglan- montior QT interval   Antihypertensives being slowly added now that PEG is in place-tube feeds held due to secretions and bowel burden  Continue bowel regimen- miralax bid, tap water enemas- GI on board   Unfortunately stool is now coming out of the peg tube, trach collar and suctioning  Will order another CT scan to see if there is possible obstruction  Will most likely need to place NG tube- discussed with surgery and nursing   Add scopolamine patch   New picc 12/2  CV surgery, cards and critical care on consult.   TTE LVEF 75-80%.   Cont BP control per Cards  -s/p pRBC transfusion on 12/4 with repeat hg stable          - Plan is to eventually discharge to LTAC  PEG tube to low intermittent suction per GI- TFs stopped   Weaning antihypertensives    Acute hypoxic respiratory failure   Aspiration event   Completed 10 days of zosyn.   Sputum cx candida   Failed SBT multiple times. Plans for trach tomorrow. PEG 12/5   ENT and GI on consult.  Pulmonary on consult.   CXR with multifocal airspace dz  Albuterol nebs   Status post trach placement 12/4  Status post PEG placement  12/5- resume tube feeds   Seroquel started to help with agitation 50 mg BID  On precedex and propofol- unsucessful weaning due to agitation and thick secretions   12/12:  off propofol, on precedex. Psych consulted   12/13 weaning precedex, psych recommends Haldol and ativan PRN       H/o tobacco and ETOH abuse   Duonebs PRN   CIWA protocol  NAIMA on CKD III   Renal on  consult.   Felt to be secondary to MARY LOU/ATN   Now on lasix drip to 10 mg/hour   Doppler renal ultrasound unremarkable for renal artery stenosis  If negative Renal will add minoxidil    Essential HTN   Coreg 25 mg BID, norvasc 10mg daily, hydralazine 150mg TID. Clonidinie patch 0.2mg TID   Add hydralazine combination with isosobide 150-40 mg TID   IV lasix given- had 1 liter of urine output - now changed to lasix drip   Webb in place   ARB on hold due to NAIMA.   12/14: On labetalol and Cardene drip , wean as able per [facility]    Iron def anemia  IV iron.   Iron level low   Transfuse for Hb < 7.0   Other medical problems  Morbid Obesity   TANYA     12/12: spoke to sister Mariajose , notify her of current clinical conditions, current treatment and plan. Answered all questions.   12/14: spoke to sister Mariajose , notify her of current clinical conditions, current treatment and plan. Answered all questions.    Quality:  DVT Prophylaxis: Heparin   CODE status: FULL   DISPO: pending clinical improvement.   Estimated date of discharge: To be determined  Discharge is dependent on: Improved clinical status  At this point Patient is expected to be discharge to: Home versus rehab      Plan of care discussed with Patient and RN.     Coordinated care with providers and counseling re: treatment plan and workup    Jessika Jimenes MD    Supplementary Documentation:      MDM: High, acute illness/severe exacerbation of chronic illness posing threat to life.  IV medications requiring close inpatient monitoring

## 2024-12-16 NOTE — PROGRESS NOTES
Patient is a 59 year old -American, single female with past medical history of CKD, hypertension, high cholesterol who was admitted to the hospital for aortic dissection. The patient has been demonstrating alternation in mood and cognition with episodes of increased restlessness and agitation. According to team, they are trying to wean patient of propofol and precedex. Patient indicated for psych consult for evaluation and advise.  Consult Duration     The patient seen for over 35-minute, follow-up evaluation, over 50% counseling and coordinating care addressing agitation, restlessness, medication management..    Record reviewed, communication with attending, communication with RN and patient seen face to face evaluation.  History of Present Illness:     According to the team the patient has been demonstrating improvement in her mood and demeanor and being talkative.  Patient continue with the regiment of Haldol and Ativan.  Precedex currently is 0.4 and patient did not indicate any need for fentanyl.    The patient is seen today laying down in her bed.  Patient with a stable vital sign.  Patient sleepy this morning otherwise after to communicate expressing that she does not feel she is in pain.  Patient admitted that she been feeling little anxious but denied being depressed, hopeless or helpless.    The patient demonstrated alertness and appropriate orientation with a questionable to her medical condition.    No episode of hallucination reported.  No agitation and no indication for restraint.    The patient today reporting that she believes she slept okay.  Patient denying any homicidal or suicidal ideation.    Provided patient with supportive psychotherapy including emotional support and encouragement.    Communicate with the team to work on lowering sedation with Precedex.    Past Medical History  Past Medical History:    Chronic kidney disease (CKD)    Esophageal reflux    High blood pressure    High  cholesterol    HYPERTENSION    Hypertension    Unspecified essential hypertension       Past Surgical History  Past Surgical History:   Procedure Laterality Date    Colonoscopy N/A 2018    Procedure: COLONOSCOPY;  Surgeon: Magdy Webber MD;  Location: [facility] [place] ENDOSCOPY    Endometrial ablation      child passed away for 5 mins          1    Other surgical history  11    cysto-Dr. Lacey -- pt denies       Family History  Family History   Problem Relation Age of Onset    Hypertension Mother     Heart Disorder Mother 70    Other (Other) Mother         kidney failure    Hypertension Father     Hypertension Maternal Grandfather     Cancer Neg     Diabetes Neg     Glaucoma Neg     Macular degeneration Neg        Social History  Social History     Socioeconomic History    Marital status: Single   Tobacco Use    Smoking status: Former     Current packs/day: 0.00     Average packs/day: 1 pack/day for 13.0 years (13.0 ttl pk-yrs)     Types: Cigarettes     Start date:      Quit date:      Years since quittin.9    Smokeless tobacco: Former   Vaping Use    Vaping status: Never Used   Substance and Sexual Activity    Alcohol use: Yes     Alcohol/week: 1.0 - 2.0 standard drink of alcohol     Types: 1 - 2 Cans of beer per week     Comment: every day    Drug use: No     Social Drivers of Health     Food Insecurity: Unknown (2024)    Food Insecurity     Food Insecurity: Patient unable to answer   Transportation Needs: Unknown (2024)    Transportation Needs     Lack of Transportation: Patient unable to answer   Housing Stability: Unknown (2024)    Housing Stability     Housing Instability: Patient unable to answer           Current Medications:  Current Facility-Administered Medications   Medication Dose Route Frequency    propofol (Diprivan) 10 MG/ML injection        pantoprazole (Protonix) 40 mg in sodium chloride 0.9% PF 10 mL IV push  40 mg Intravenous Daily    furosemide  (Lasix) 10 mg/mL injection 40 mg  40 mg Intravenous BID (Diuretic)    adult 3 in 1 TPN   Intravenous Continuous TPN    LORazepam (Ativan) 2 mg/mL injection 0.5 mg  0.5 mg Intravenous QID    LORazepam (Ativan) 2 mg/mL injection 1 mg  1 mg Intravenous Q4H PRN    labetalol (Trandate) 400 mg in sodium chloride 0.9% 200 mL infusion  0.5-10 mg/min Intravenous Continuous    haloperidol lactate (Haldol) 5 MG/ML injection 2 mg  2 mg Intravenous Q6H PRN    dextrose 10% infusion (TPN no rate)   Intravenous Continuous PRN    fentaNYL (Sublimaze) 50 mcg/mL injection 25 mcg  25 mcg Intravenous Q2H PRN    fentaNYL (Sublimaze) 50 mcg/mL injection 50 mcg  50 mcg Intravenous Q2H PRN    bisacodyl (Dulcolax) 10 MG rectal suppository 10 mg  10 mg Rectal Daily PRN    dexmedeTOMIDine (Precedex) 800 mcg in sodium chloride 0.9% 100 mL infusion  0.2-1.5 mcg/kg/hr (Dosing Weight) Intravenous Continuous    acetaminophen (Ofirmev) 10 mg/mL infusion premix 1,000 mg  1,000 mg Intravenous Q6H PRN    cloNIDine (Catapres) 0.3 MG/24HR patch 1 patch  1 patch Transdermal Weekly    sodium chloride 3 % nebulizer solution 3 mL  3 mL Nebulization PRN    albuterol (Ventolin) (2.5 MG/3ML) 0.083% nebulizer solution 2.5 mg  2.5 mg Nebulization Q6H PRN    piperacillin-tazobactam (Zosyn) 3.375 g in dextrose 5% 100 mL IVPB-ADDV  3.375 g Intravenous Q8H    hydrALAzine (Apresoline) 20 mg/mL injection 10 mg  10 mg Intravenous Q4H PRN    carvedilol (Coreg) tab 37.5 mg  37.5 mg Oral BID with meals    acetaminophen (Tylenol) 160 MG/5ML oral liquid 650 mg  650 mg Oral Q6H PRN    aspirin chewable tab 81 mg  81 mg Peg Tube Daily    niCARdipine (carDENE) 125 mg in sodium chloride 0.9% 250 mL infusion  5-15 mg/hr Intravenous Continuous    isosorbide dinitrate (Isordil) tab 40 mg  40 mg Per NG Tube TID (Nitrates)    hydrALAZINE (Apresoline) tab 150 mg  150 mg Oral Q8H GABRIEL    amLODIPine (Norvasc) tab 10 mg  10 mg Oral Daily    ipratropium-albuterol (Duoneb) 0.5-2.5 (3) MG/3ML  inhalation solution 3 mL  3 mL Nebulization Q6H PRN    HYDROcodone-acetaminophen (Norco) 5-325 MG per tab 2 tablet  2 tablet Oral Q4H PRN    heparin (Porcine) 5000 UNIT/ML injection 5,000 Units  5,000 Units Subcutaneous Q8H GABRIEL    melatonin tab 3 mg  3 mg Oral Nightly PRN    potassium chloride 20 mEq/100mL IVPB premix 20 mEq  20 mEq Intravenous PRN    Or    potassium chloride 40 mEq/100mL IVPB premix (central line) 40 mEq  40 mEq Intravenous PRN    magnesium sulfate in dextrose 5% 1 g/100mL infusion premix 1 g  1 g Intravenous PRN    magnesium sulfate in sterile water for injection 2 g/50mL IVPB premix 2 g  2 g Intravenous PRN    chlorhexidine gluconate (Peridex) 0.12 % oral solution 15 mL  15 mL Mouth/Throat BID     Medications Prior to Admission   Medication Sig    Acetaminophen ER (TYLENOL 8 HOUR) 650 MG Oral Tab CR Take 2 tablets (1,300 mg total) by mouth daily as needed.    Calcium Carb-Cholecalciferol 600-10 MG-MCG Oral Tab Take 1 tablet by mouth daily.    metoprolol succinate ER 50 MG Oral Tablet 24 Hr Take 1 tablet (50 mg total) by mouth daily.    Losartan Potassium-HCTZ 100-12.5 MG Oral Tab Take 1 tablet by mouth daily.    Potassium Chloride ER 20 MEQ Oral Tab CR Take 1 tablet by mouth daily.    albuterol 108 (90 Base) MCG/ACT Inhalation Aero Soln Inhale 2 puffs into the lungs every 4 (four) hours as needed for Wheezing.    amLODIPine 10 MG Oral Tab Take 1 tablet (10 mg total) by mouth daily.    Ascorbic Acid (VITAMIN C) 1000 MG Oral Tab Take 1 tablet (1,000 mg total) by mouth daily.    vitamin B-12 50 MCG Oral Tab Take 1 tablet (50 mcg total) by mouth daily.    fluticasone propionate 50 MCG/ACT Nasal Suspension 2 sprays by Nasal route daily.    fluticasone-salmeterol 250-50 MCG/ACT Inhalation Aerosol Powder, Breath Activated Inhale 1 puff into the lungs 2 (two) times daily. inhale 1 puff by INHALATION route 2 times every day morning and evening approximately 12 hours apart (Patient not taking: Reported on  11/21/2024)       Allergies  Allergies[1]    Review of Systems:   As by Admitting/Attending    Results:   Laboratory Data:  Lab Results   Component Value Date    WBC 9.8 12/16/2024    HGB 8.5 (L) 12/16/2024    HCT 26.2 (L) 12/16/2024    .0 12/16/2024    CREATSERUM 1.19 (H) 12/16/2024    BUN 24 (H) 12/16/2024     12/16/2024    K 4.5 12/16/2024     12/16/2024    CO2 23.0 12/16/2024     (H) 12/16/2024    CA 8.6 (L) 12/16/2024    ALB 3.2 12/15/2024    ALKPHO 68 12/15/2024    TP 5.7 12/15/2024    AST 25 12/15/2024    ALT 23 12/15/2024    PTT 30.7 12/04/2024    INR 1.17 12/04/2024    PTP 15.6 (H) 12/04/2024    TSH 2.085 12/07/2024    NATHALIE 99 11/25/2024    LIP 28 11/25/2024    DDIMER 0.42 12/08/2019    ESRML 15 06/05/2021    MG 2.0 12/16/2024    PHOS 3.8 12/16/2024    TROP <0.045 12/08/2019     (H) 09/23/2021    B12 1,156 (H) 07/23/2018         Imaging:  XR CHEST AP PORTABLE  (CPT=71045)    Result Date: 12/16/2024  CONCLUSION: Right basal pulmonary opacity suggesting pneumonia.  No significant interval change since most recent exam from 12/15/24.  Findings are worse since 12/12/24.    Dictated by (CST): Jagjit Jin MD on 12/16/2024 at 10:19 AM     Finalized by (CST): Jagjit Jin MD on 12/16/2024 at 10:20 AM          XR ABDOMEN (1 VIEW) (CPT=74018)    Result Date: 12/16/2024  CONCLUSION: Nonspecific-nonobstructive bowel gas pattern.  Dilated bowel loops demonstrated on preceding exam is not redemonstrated.    Dictated by (CST): Jagjit Jin MD on 12/16/2024 at 10:16 AM     Finalized by (CST): Jagjit Jin MD on 12/16/2024 at 10:18 AM          XR CHEST AP PORTABLE  (CPT=71045)    Result Date: 12/15/2024  CONCLUSION:   Right greater than left patchy bilateral lung opacity which may be mildly increased in the right lung base.    Dictated by (CST): Javid Watts MD on 12/15/2024 at 7:23 AM     Finalized by (CST): Javid Watts MD on 12/15/2024 at 7:24 AM          XR ABDOMEN (1 VIEW)  (CPT=74018)    Result Date: 12/14/2024  CONCLUSION:  Significantly limited examination.  Ongoing dilation of small bowel loops up to 4.7 cm, previously 4.5 cm.  Recommend continued radiographic/clinical follow-up.    Dictated by (CST): Karen Ureña MD on 12/14/2024 at 11:02 AM     Finalized by (CST): Karen Ureña MD on 12/14/2024 at 11:04 AM           Vital Signs:   Blood pressure 147/63, pulse 70, temperature 97.8 °F (36.6 °C), temperature source Temporal, resp. rate 18, height 65\", weight (!) 140.2 kg (309 lb 1.4 oz), SpO2 100%, not currently breastfeeding.    Mental Status Exam:   Appearance: Stated age female, in hospital gown, laying down in hospital bed.  Psychomotor: No psychomotor agitation reported or observed.  Some psychomotor slowness.  Orientation: Alert and oriented to person and hospital. Patient confused about her medical condition otherwise has been cooperative and compliant  Gait: Not evaluated.  Attitude/Coorperation: Some confusion with improvement otherwise cooperative.  Behavior: No episode of agitation reported.  Patient sleeping most of the time.  Speech: Patient spoke with some dysarthric speech but understandable.  Mood: Patient has been difficulty expressing her emotion  Affect: Anxious and irritable affect.  Thought process: Confused, disorganized  Thought content: No reports of  suicidal or homicidal ideation.  Perceptions: Patient denied any auditory or visual hallucination.  Concentration: Grossly impaired  Memory: Grossly impaired  Intellect: Average.  Judgment and Insight: Questionable.     Impression:   Delirium due to another medical condition.  Episodic mood disorder  Agitation    Dissection of ascending aorta (HCC)    NAIMA (acute kidney injury) ([facility])    Stage 3 chronic kidney disease (HCC)    Acute respiratory failure with hypoxia (HCC)    Hypervolemia    Ileus (HCC)      Patient is a 59 year old -American, single female with past medical history of CKD,  hypertension, high cholesterol who was admitted to the hospital for aortic dissection. The patient has been demonstrating alternation in mood and cognition with episodes of increased restlessness and agitation. According to team, they are trying to wean patient of propofol and precedex.     12/13/2024: patient remains on precedex. Continues to have episodes of increased restlessness and agitation. Haldol given this morning.     12/14/2024: The patient has been suffering from severe anxiety, confusion, challenging medical condition and delirious episode caused more paranoia and agitation.  Presents is reasonable approach but stabilizing the mood with antipsychotic medication in the appropriate approach at this point.    12/15/2024: The patient has been demonstrating improvement to the addition of Haldol with decrease in the Precedex.    12/16/2024: The patient continue demonstrating slight improvement with no agitation, no hallucination but some anxiety and slight confusion about her medical condition.  Precedex is 0.4 can be lowered gradually.    Discussed risk and benefit, acknowledging the current symptom and severity.  At this point, I would recommend the following approach:     Focus on safety  Focus on education and support.  Focus on insight orientation helping the patient understand diagnosis and treatment plan.  Continue Ativan 0.5 mg IV every 6 hours scheduled.  Continue Haldol 2 mg IV 3 times daily scheduled.  Utilize ativan 1 mg IV q 4 hours PRN for anxiety  Utilize haldol 2 mg IV q 6 hours PRN for agitation.  Work with the team to lower sedation depends on the patient response.  Processed with patient at length, the initiation of the above psychotropic medications I advised the patient of the risks, benefits, alternatives and potential side effects. The patient consents to administration of the medications and understands the right to refuse medications at any time. The patient verbalized understanding.    Coordinate plan with team    Orders This Visit:  Orders Placed This Encounter   Procedures    Troponin I (High Sensitivity)    CBC With Differential With Platelet    Basic Metabolic Panel (8)    Hepatic Function Panel (7)    PTT, Activated    Prothrombin Time (PT)    Pro Beta Natriuretic Peptide    Open heart surgical profile    CBC, Platelet; No Differential    CBC With Differential With Platelet    Prothrombin Time (PT)    PTT, Activated    Fibrinogen Activity    Expanded Arterial Blood Gas    Expanded Arterial Blood Gas    Arterial blood gas    CBC, Platelet; No Differential    Basic Metabolic Panel (8)    Magnesium    Comp Metabolic Panel (14)    Magnesium    Expanded Arterial Blood Gas    Arterial blood gas    CBC, Platelet; No Differential    Magnesium    Renal Function Panel    Urinalysis, Routine    Microalb/Creat Ratio, Random Urine    Comp Metabolic Panel (14)    Magnesium    CBC With Differential With Platelet    Phosphorus    CBC, Platelet; No Differential    Heparin Induced Platelet    Expanded Arterial Blood Gas    Magnesium    Renal Function Panel    Lipase    Amylase    Lactic Acid, Plasma    Basic Metabolic Panel (8)    Potassium    Lipid Panel    Basic Metabolic Panel (8)    CBC, Platelet; No Differential    CBC, Platelet; No Differential    Magnesium    Renal Function Panel    CBC, Platelet; No Differential    Triglycerides    Magnesium    CBC With Differential With Platelet    Renal Function Panel    Manual differential    CBC With Differential With Platelet    Procalcitonin    Manual differential    CBC With Differential With Platelet    Renal Function Panel    Magnesium    Manual differential    REDRAW RENAL    Magnesium    CBC With Differential With Platelet    Comp Metabolic Panel (14)    Iron And Tibc    Phosphorus    Manual differential    Expanded Arterial Blood Gas    Magnesium    CBC With Differential With Platelet    Renal Function Panel    PTT, Activated    Prothrombin Time (PT)     Manual differential    CBC, Platelet; No Differential    Comp Metabolic Panel (14)    Lipid Panel    Expanded Arterial Blood Gas    Basic Metabolic Panel (8)    CBC, Platelet; No Differential    Magnesium    Renal Function Panel    CBC With Differential With Platelet    Magnesium    Renal Function Panel    CBC With Differential With Platelet    Potassium    TSH W Reflex To Free T4    Magnesium    CBC With Differential With Platelet    Renal Function Panel    Scan slide    Magnesium    Phosphorus    CBC With Differential With Platelet    Comp Metabolic Panel (14)    REDRAW CMP (P)    Basic Metabolic Panel (8)    CBC, Platelet; No Differential    Basic Metabolic Panel (8)    CBC, Platelet; No Differential    Basic Metabolic Panel (8)    REDRAW BMP    Potassium    Basic Metabolic Panel (8)    CBC With Differential With Platelet    Scan slide    Potassium    RBC Morphology Scan    Magnesium    Phosphorus    Basic Metabolic Panel (8)    Comp Metabolic Panel (14)    Magnesium    Phosphorus    Triglycerides    Triglycerides    Potassium    CBC, Platelet; No Differential    CBC, Platelet; No Differential    Potassium    Vancomycin Peak, Serum    CBC, Platelet; No Differential    Comp Metabolic Panel (14)    Hemoglobin & Hematocrit    CBC, Platelet; No Differential    Magnesium    Phosphorus    Basic Metabolic Panel (8)    Type and screen    ABORH (Blood Type)    Antibody Screen    ABORH Confirmation    Prepare platelets Once    Prepare fresh frozen plasma Once    Prepare RBC STAT    Prepare cryoprecipitate Once    Type and screen    Prepare RBC Once    ABORH (Blood Type)    Antibody Screen    Type and screen    Prepare RBC STAT    ABORH (Blood Type)    Antibody Screen    Type and screen    Prepare RBC STAT    ABORH (Blood Type)    Antibody Screen    Specimen to Pathology Tissue    Rapid SARS-CoV-2 by PCR    MRSA Screen by PCR    Sputum culture    Sputum culture       Meds This Visit:  Requested Prescriptions      No  prescriptions requested or ordered in this encounter         Armen Dudley MD  12/16/2024    Note to Patient: The 21st Century Cures Act makes medical notes like these available to patients in the interest of transparency. However, be advised this is a medical document. It is intended as peer to peer communication. It is written in medical language and may contain abbreviations or verbiage that are unfamiliar. It may appear blunt or direct. Medical documents are intended to carry relevant information, facts as evident, and the clinical opinion of the practitioner. This note may have been transcribed using a voice dictation system. Voice recognition errors may occur. This should not be taken to alter the content or meaning of this note.           [1]   Allergies  Allergen Reactions    Atorvastatin MYALGIA    Pravastatin MYALGIA    Rosuvastatin MYALGIA    Seasonal Runny nose

## 2024-12-16 NOTE — PAYOR COMM NOTE
--------------  12/16 CONTINUED STAY REVIEW    Payor: [employer]/HMO/POS/EPO  Subscriber #:  [id number]  Authorization Number: [id number]    REMAINS IN ICU    CRITICAL CARE:    Vomited again this am   Became bradycardic and had to be bagged.        Scheduled Medications    propofol          LORazepam  0.5 mg Intravenous QID    furosemide  40 mg Intravenous Daily    cloNIDine  1 patch Transdermal Weekly    piperacillin-tazobactam  3.375 g Intravenous Q8H    carvedilol  37.5 mg Oral BID with meals    aspirin  81 mg Peg Tube Daily    isosorbide dinitrate  40 mg Per NG Tube TID (Nitrates)    hydrALAZINE  150 mg Oral Q8H GABRIEL    amLODIPine  10 mg Oral Daily    heparin  5,000 Units Subcutaneous Q8H GABRIEL    chlorhexidine gluconate  15 mL Mouth/Throat BID    famotidine  20 mg Intravenous Daily         Continuous Infusing Medication:  Medication Infusions    adult 3 in 1 TPN 91.7 mL/hr at 12/15/24 2108    labetalol (Trandate) 400 mg in sodium chloride 0.9% 200 mL infusion 0.5 mg/min (12/16/24 0445)    dextrose 10%      dexmedetomidine 0.4 mcg/kg/hr (12/15/24 1518)    niCARdipine 15 mg/hr (12/16/24 0615)             PHYSICAL EXAM:  /58 (BP Location: Left arm)   Pulse 75   Temp 98.4 °F (36.9 °C) (Axillary)   Resp 19   Ht 5' 5\" (1.651 m)   Wt (!) 309 lb 1.4 oz (140.2 kg)   SpO2 100%   BMI 51.43 kg/m²   Vent Mode: PRVC/AC  FiO2 (%):  [30 %] 30 %  S RR:  [18] 18  S VT:  [550 mL] 550 mL  PEEP/CPAP (cm H2O):  [5 cm H20] 5 cm H20  MAP (cm H2O):  [12-13] 12    CONSTITUTIONAL: intubated awake just vomited  HEENT: atraumatic normocephalic  MOUTH: MMM  NECK/THROAT: no JVD. Trachea midline. No obvious thyromegaly. + trach with min bleeding. + emesis in mouth and around trach  LUNG: coarse BS b/l no wheezing, + crackles. Diminished at bases. Chest symmetric with respiratory motion. + midsternal surgical wound healing   HEART: tachy but regular rate and rhythm, no obvious murmers or gallops noted  ABD: soft non  distended not tender. + hypoactive bowel sounds. No organomegaly noted. + PEG tube to LIS  EXT: no clubbing, cyanosis, or edema noted. Pulses intact grossly       Lab 12/12/24  0443 12/14/24  0631 12/14/24  1542 12/15/24  0445 12/16/24  0508   RBC 2.95* 2.73*  --  2.98* 3.12*   HGB 8.3* 7.4* 8.0* 8.0* 8.5*   HCT 25.3* 23.6* 24.7* 25.2* 26.2*   MCV 85.8 86.4  --  84.6 84.0   MCH 28.1 27.1  --  26.8 27.2   MCHC 32.8 31.4  --  31.7 32.4   RDW 16.8* 17.1*  --  20.2* 19.5*   NEPRELIM 3.64  --   --   --   --    WBC 6.5 8.4  --  7.5 9.8   .0 254.0  --  216.0 222.0     Lab 12/13/24  0612 12/14/24  0631 12/15/24  0445 12/16/24  0508   * 161* 160*  160* 156*   BUN 27* 26* 27*  27* 24*   CREATSERUM 1.28* 1.28* 1.24*  1.24* 1.14*   EGFRCR 48* 48* 50*  50* 55*   CA 8.4* 8.4* 8.5*  8.5* 8.7   ALB 3.3  --  3.2  --    * 148* 146*  146* 141   K 3.4*  3.4* 4.2  4.2 4.6  4.6 4.4   * 117* 116*  116* 111   CO2 21.0 22.0 23.0  23.0 24.0            ASSESSMENT/PLAN:  Type A aortic dissection s/p repair now intubated in ICU  -on nicardipine/labetolol  -multiple oral antiHTN meds for BP being held d/t ileus/SBO  -s/p pRBC transfusion 12/14/24 for anemia. No obvious s/s bleeding     Post op acute respiratory failure  -complicated by extubation and reimergent intubation and significant emesis concerning for aspiration PNA vs pneumonitis  -started on zosyn-completed initial 10 day course  -checked ETT asp-candida likely contaminant  -failed multiple SBTs d/t increased RR, work of breathing, hypertension, hypoxemia, significant thick secretions  -remains MV dependent   -hx of likely TANYA and previous smoking hx. Has sign dense consolidations and atelectasis on chest CT  -s/p trach by ENT on 12/4/24 and PEG by GI on 12/5/24  -start trach/vent weaning as able-limited by sedation needs, BP, and GI issues  -PRN ABG and CXR  -saline nebs  -on propofol and precedex-having difficulty weaning as pt becomes agitated  and hypertensive. Started seroquel 50 mg BID but holding since can't use PEG tube. Started fentanyl but this will contribute to ileus  -psych consult for assistance with sedation and agitation-defer to their recs  -on scheduled clonidine patch in attempt to wean off precedex  -restarted on vanco/zosyn for recurrent aspiration PNA. Stop vanco  -SBT 12/15 for 90 min     Previous hx of smoking and ETOH  -continue duonebs PRN     NAIMA on CKD and metabolic acidosis  -likely from surgery, and acute issues  -monitor UO and renal labs  -renal following   -diuresis as per cardiology and renal     Abd pain  -s/p abd CT as above  -not tolerating TF now  -PEG tube to LIS  -aspirated again this am with emesis around trach in mouth  -GI and surgery following     Proph:  -DVT: hep sq     Dispo:  -full code  -SW/ to assist with LTAC placement    CV SURGERY:    Pt awake/alert during initial assessment this morning. She has Trach in place on full vent support/on low dose Precedex. She mouthed \"what happened to me?\" She is calm and able to follow simple commands. Nods head to simple questions. Denies pain and SOB.      Called into room approx 30 minutes after assessment due to bradycardia (30s): upon arrival to room also noted desats (60s). Emesis episode noted around Trach likely causing this event. Bedside RN and RT at bedside. Ambu bagged pt with immediate improvement in VS/O2 sats/pt stable. Pt alert during brief episode. Contact Critical Care MD who arrived at bedside. Abd X-ray & CXR ordered. PEG is to LIS. +BM noted      Assessment/Plan:  S/P EMERGENT AORTIC DISSECTION REPAIR POD # 24  Respiratory Failure w/multiple failed SBT prior to trach placement -currently on full vent support-continue daily weaning trials w/mgt per Pulm. Re-intubated on 11/22 per Pulm after emesis episode/ETT removal due to emesis content noted in airway.  CXR pending  S/P Trach placement per ENT on 12/4 w/Trach changed 12/10   Completed ABX  course for suspected aspiration pneumonia post op.  ABX re-started on 12/9 after emesis episode on 12/9: concern for aspiration   +BM (s) post op however pt w/episode of emesis on 12/9 w/stool content noted/+high PEG residuals. TF continue on hold. PEG to LIS. Pt w/intermittent episodes of emesis. Mgt per GI. Abd X-ray today.   Hx: HTN: on multiple antihypertensives including IV Cardene & IV Labetolol-weaning as able: SBP goal= <130/MAP >70. Mgt per Cards. Can wean once able to take meds through PEG.  New A-line placed 12/2  New PICC placed 12/2  Pain meds as needed-avoid/limit narcotics w/recent constipation/emesis episodes/dilated bowel loops on Abdominal X-ray on 12/12  Scds/Heparin SubQ prophylaxis DVT prevention. HIPA 11/25=negative. Plts continue >100,000  Continue ICU status  Hx: CKD. Expected post op volume overload w/mgt per Nephrology (consulted on 11/23) for NAIMA: CR continues to improve. Limit/avoid nephrotoxic meds: Lasix per Nephrology. Aguilar remains for close I/O monitoring/immobility-new aguilar placed 12/9 then again 12/10 due to obstruction. Kidney US w/doppler on 12/8.   Expected post op anemia: w/o clinical significance/stable. May be slightly diluted due to volume overload.   Hx: Morbid obesity w/BMI >40-plan for RD to see when appropriate  Nutrition per TPN. PEG placed 12/5 per GI.  PEG to LIS. Mgt per GI.  Hx: ETOH abuse-monitor for withdrawals. CIWA protocol PRN   Psych consulted 12/12 to assist w/med mgt-follow recs  Discharge planning: pt lives alone and has supportive family.   Anticipate LTAC at discharge: referrals sent proactively by  on 11/29 for LTAC.      MEDICATIONS ADMINISTERED IN LAST 1 DAY:  adult 3 in 1 TPN       Date Action Dose Route User    12/15/2024 2108 New Bag (none) Intravenous Neli Mike, RN       dexmedeTOMIDine (Precedex) 800 mcg in sodium chloride 0.9% 100 mL infusion       Date Action Dose Route User    12/16/2024 1400 Rate/Dose Verify 0.2 mcg/kg/hr × 117.1  kg (Dosing Weight) Intravenous Nandini Davidson RN    12/16/2024 1200 Rate/Dose Verify 0.2 mcg/kg/hr × 117.1 kg (Dosing Weight) Intravenous Nandini Davidson RN    12/16/2024 1000 Rate/Dose Change 0.2 mcg/kg/hr × 117.1 kg (Dosing Weight) Intravenous Bryan Black RN    12/16/2024 0927 New Bag 0.4 mcg/kg/hr × 117.1 kg (Dosing Weight) Intravenous Bryan Black RN          furosemide (Lasix) 10 mg/mL injection 40 mg       Date Action Dose Route User    12/16/2024 0926 Given 40 mg Intravenous Bryan Black RN          haloperidol lactate (Haldol) 5 MG/ML injection 2 mg       Date Action Dose Route User    12/16/2024 0240 Given 2 mg Intravenous Neli Mike RN          heparin (Porcine) 5000 UNIT/ML injection 5,000 Units       Date Action Dose Route User    12/16/2024 1323 Given 5,000 Units Subcutaneous (Left Lower Abdomen) Micaela Fraire RN    12/16/2024 0512 Given 5,000 Units Subcutaneous (Right Upper Arm) Neli Mike RN    12/15/2024 2108 Given 5,000 Units Subcutaneous (Right Lower Abdomen) Neli Mike RN          labetalol (Trandate) 400 mg in sodium chloride 0.9% 200 mL infusion       Date Action Dose Route User    12/16/2024 1400 Rate/Dose Change 0.5 mg/min Intravenous Nandini Davidson RN    12/16/2024 1200 Rate/Dose Verify 0.5 mg/min Intravenous Nandini Davidson RN    12/16/2024 0445 Rate/Dose Change 0.5 mg/min Intravenous Neli Mike RN    12/16/2024 0336 New Bag 1 mg/min Intravenous Neli Mike RN    12/15/2024 2008 New Bag 1 mg/min Intravenous Neli Mike RN    12/15/2024 1958 Rate/Dose Change 1 mg/min Intravenous Neli Mike RN          LORazepam (Ativan) 2 mg/mL injection 0.5 mg       Date Action Dose Route User    12/16/2024 1323 Given 0.5 mg Intravenous Micaela Fraire RN    12/16/2024 0858 Given 0.5 mg Intravenous Bryan Black RN    12/15/2024 2018 Given 0.5 mg Intravenous Neli Mike RN          LORazepam (Ativan) 2 mg/mL injection 1 mg       Date  Action Dose Route User    12/16/2024 0239 Given 1 mg Intravenous Neli Mike RN          niCARdipine (carDENE) 125 mg in sodium chloride 0.9% 250 mL infusion       Date Action Dose Route User    12/16/2024 1450 New Bag 15 mg/hr Intravenous Nandini Davidson RN    12/16/2024 1400 Rate/Dose Verify 15 mg/hr Intravenous Nandini Davidson, RN    12/16/2024 1200 Rate/Dose Verify 15 mg/hr Intravenous Nandini Davidson RN    12/16/2024 0615 New Bag 15 mg/hr Intravenous Neli Mike RN    12/15/2024 2117 New Bag 15 mg/hr Intravenous Neli Mike RN          pantoprazole (Protonix) 40 mg in sodium chloride 0.9% PF 10 mL IV push       Date Action Dose Route User    12/16/2024 1120 Given 40 mg Intravenous Bryan Black RN          piperacillin-tazobactam (Zosyn) 3.375 g in dextrose 5% 100 mL IVPB-ADDV       Date Action Dose Route User    12/16/2024 0927 New Bag 3.375 g Intravenous Bryan Black RN    12/16/2024 0206 New Bag 3.375 g Intravenous Neli Mike RN    12/15/2024 1751 New Bag 3.375 g Intravenous Mitchel Blankenship RN

## 2024-12-16 NOTE — RESPIRATORY THERAPY NOTE
Patient with tracheostomy received on ventilator support. PRVC/AC 18/550/+5/30%. Patient vomited this shift, dressings replaced. Respiratory status stable and tolerating ventilator well, suctioned as needed.

## 2024-12-16 NOTE — PROGRESS NOTES
[facility]  part of [facility]    Progress Note    Alisha Wilde Patient Status:  Inpatient    10/25/1965 MRN [id number]   Location [facility] 2W/SW Attending Jessika Jimenes MD   Hosp Day # 24 PCP Bridger Avendano MD       Subjective:   Alisha Wilde is a(n) 59 year old female who I am seeing for NAIMA    The patient does had a bradycardic event with a bowel movement      Objective:   /58 (BP Location: Left arm)   Pulse 75   Temp 98.4 °F (36.9 °C) (Axillary)   Resp 19   Ht 5' 5\" (1.651 m)   Wt (!) 309 lb 1.4 oz (140.2 kg)   SpO2 100%   BMI 51.43 kg/m²      Intake/Output Summary (Last 24 hours) at 2024 0941  Last data filed at 2024 0600  Gross per 24 hour   Intake 4910 ml   Output 4200 ml   Net 710 ml     Wt Readings from Last 1 Encounters:   24 (!) 309 lb 1.4 oz (140.2 kg)       Exam  Gen: No acute distress, Heent: NC AT, mucous memb clear, neck supple  Pulm: Lungs clear, normal respiratory effort  CV: Heart with regular rate and rhythm, edema  Abd: Abdomen soft, nontender, nondistended, no organomegaly, bowel sounds present  Skin: no symptoms reported  Psych: alert and oriented    Assessment and Plan:     1 - NAIMA/fluid overload  Creatinine has recovered.  However she is now extremely fluid overloaded.  Will escalate diuretic today    2 -respiratory failure  Failed multiple SBT's/now has a trach    3 -emergent aortic dissection status post repair  IV Cardene/labetalol          Results:     Recent Labs   Lab 24  0443 24  0904 24  0631 24  1542 12/15/24  0445 24  0508   RBC 2.95*   < > 2.73*  --  2.98* 3.12*   HGB 8.3*   < > 7.4* 8.0* 8.0* 8.5*   HCT 25.3*   < > 23.6* 24.7* 25.2* 26.2*   MCV 85.8   < > 86.4  --  84.6 84.0   MCH 28.1   < > 27.1  --  26.8 27.2   MCHC 32.8   < > 31.4  --  31.7 32.4   RDW 16.8*   < > 17.1*  --  20.2* 19.5*   NEPRELIM 3.64  --   --   --   --   --    WBC 6.5   < > 8.4  --  7.5 9.8   .0   < > 254.0   --  216.0 222.0    < > = values in this interval not displayed.         Recent Labs   Lab 12/14/24  0631 12/15/24  0445 12/16/24  0508   * 160*  160* 156*   BUN 26* 27*  27* 24*   CREATSERUM 1.28* 1.24*  1.24* 1.14*   CA 8.4* 8.5*  8.5* 8.7   * 146*  146* 141   K 4.2  4.2 4.6  4.6 4.4   * 116*  116* 111   CO2 22.0 23.0  23.0 24.0          XR CHEST AP PORTABLE  (CPT=71045)    Result Date: 12/15/2024  CONCLUSION:   Right greater than left patchy bilateral lung opacity which may be mildly increased in the right lung base.    Dictated by (CST): Javid Watts MD on 12/15/2024 at 7:23 AM     Finalized by (CST): Javid Watts MD on 12/15/2024 at 7:24 AM          XR ABDOMEN (1 VIEW) (CPT=74018)    Result Date: 12/14/2024  CONCLUSION:  Significantly limited examination.  Ongoing dilation of small bowel loops up to 4.7 cm, previously 4.5 cm.  Recommend continued radiographic/clinical follow-up.    Dictated by (CST): Karen Ureña MD on 12/14/2024 at 11:02 AM     Finalized by (CST): Karen Ureña MD on 12/14/2024 at 11:04 AM               ZAY LINARES MD  12/16/2024

## 2024-12-17 LAB
ANION GAP SERPL CALC-SCNC: 8 MMOL/L (ref 0–18)
BASOPHILS # BLD: 0 X10(3) UL (ref 0–0.2)
BASOPHILS NFR BLD: 0 %
BUN BLD-MCNC: 22 MG/DL (ref 9–23)
BUN/CREAT SERPL: 19.8 (ref 10–20)
CALCIUM BLD-MCNC: 8.7 MG/DL (ref 8.7–10.4)
CHLORIDE SERPL-SCNC: 109 MMOL/L (ref 98–112)
CO2 SERPL-SCNC: 24 MMOL/L (ref 21–32)
CREAT BLD-MCNC: 1.11 MG/DL
DEPRECATED RDW RBC AUTO: 57.3 FL (ref 35.1–46.3)
EGFRCR SERPLBLD CKD-EPI 2021: 57 ML/MIN/1.73M2 (ref 60–?)
EOSINOPHIL # BLD: 0.43 X10(3) UL (ref 0–0.7)
EOSINOPHIL NFR BLD: 4 %
ERYTHROCYTE [DISTWIDTH] IN BLOOD BY AUTOMATED COUNT: 19.5 % (ref 11–15)
GLUCOSE BLD-MCNC: 157 MG/DL (ref 70–99)
GLUCOSE BLDC GLUCOMTR-MCNC: 157 MG/DL (ref 70–99)
GLUCOSE BLDC GLUCOMTR-MCNC: 164 MG/DL (ref 70–99)
GLUCOSE BLDC GLUCOMTR-MCNC: 166 MG/DL (ref 70–99)
GLUCOSE BLDC GLUCOMTR-MCNC: 178 MG/DL (ref 70–99)
HCT VFR BLD AUTO: 26 %
HGB BLD-MCNC: 8.3 G/DL
LYMPHOCYTES NFR BLD: 1.08 X10(3) UL (ref 1–4)
LYMPHOCYTES NFR BLD: 10 %
MAGNESIUM SERPL-MCNC: 1.8 MG/DL (ref 1.6–2.6)
MCH RBC QN AUTO: 26.5 PG (ref 26–34)
MCHC RBC AUTO-ENTMCNC: 31.9 G/DL (ref 31–37)
MCV RBC AUTO: 83.1 FL
METAMYELOCYTES # BLD: 0.43 X10(3) UL
METAMYELOCYTES NFR BLD: 4 %
MONOCYTES # BLD: 0.86 X10(3) UL (ref 0.1–1)
MONOCYTES NFR BLD: 8 %
MORPHOLOGY: NORMAL
MYELOCYTES # BLD: 0.11 X10(3) UL
MYELOCYTES NFR BLD: 1 %
NEUTROPHILS # BLD AUTO: 6.6 X10 (3) UL (ref 1.5–7.7)
NEUTROPHILS NFR BLD: 68 %
NEUTS BAND NFR BLD: 5 %
NEUTS HYPERSEG # BLD: 7.88 X10(3) UL (ref 1.5–7.7)
OSMOLALITY SERPL CALC.SUM OF ELEC: 299 MOSM/KG (ref 275–295)
PHOSPHATE SERPL-MCNC: 3.5 MG/DL (ref 2.4–5.1)
PLATELET # BLD AUTO: 225 10(3)UL (ref 150–450)
PLATELET MORPHOLOGY: NORMAL
POTASSIUM SERPL-SCNC: 4 MMOL/L (ref 3.5–5.1)
RBC # BLD AUTO: 3.13 X10(6)UL
SODIUM SERPL-SCNC: 141 MMOL/L (ref 136–145)
TOTAL CELLS COUNTED BLD: 100
WBC # BLD AUTO: 10.8 X10(3) UL (ref 4–11)

## 2024-12-17 PROCEDURE — 99233 SBSQ HOSP IP/OBS HIGH 50: CPT | Performed by: INTERNAL MEDICINE

## 2024-12-17 PROCEDURE — 99233 SBSQ HOSP IP/OBS HIGH 50: CPT | Performed by: OTHER

## 2024-12-17 PROCEDURE — 99232 SBSQ HOSP IP/OBS MODERATE 35: CPT | Performed by: INTERNAL MEDICINE

## 2024-12-17 RX ORDER — MAGNESIUM SULFATE HEPTAHYDRATE 40 MG/ML
2 INJECTION, SOLUTION INTRAVENOUS ONCE
Status: COMPLETED | OUTPATIENT
Start: 2024-12-17 | End: 2024-12-17

## 2024-12-17 RX ORDER — METOCLOPRAMIDE HYDROCHLORIDE 5 MG/ML
10 INJECTION INTRAMUSCULAR; INTRAVENOUS EVERY 6 HOURS
Status: DISCONTINUED | OUTPATIENT
Start: 2024-12-17 | End: 2024-12-20

## 2024-12-17 NOTE — PROGRESS NOTES
[facility]  part of [facility]    Progress Note    Alisha Wilde Patient Status:  Inpatient    10/25/1965 MRN [id number]   Location [facility] 2W/SW Attending Jessika Jimenes MD   Hosp Day # 25 PCP Bridger Avendano MD     Subjective:  Pt awake/alert/calm and tries to talk. She is currently on full vent support w/Trach in place. She continues to slowly improve daily and gain strength. She denies pain and SOB. Able to move all ext & follow simple commands.   No visitors at bedside.     Objective:  /65 (BP Location: Left arm)   Pulse 82   Temp 99 °F (37.2 °C) (Temporal)   Resp 19   Ht 5' 5\" (1.651 m)   Wt 286 lb 6 oz (129.9 kg)   SpO2 99%   BMI 47.66 kg/m²     Temp (24hrs), Av.2 °F (36.8 °C), Min:96.9 °F (36.1 °C), Max:99 °F (37.2 °C)      Intake/Output:    Intake/Output Summary (Last 24 hours) at 2024 0844  Last data filed at 2024 0600  Gross per 24 hour   Intake 3460.32 ml   Output 7350 ml   Net -3889.68 ml       Wt Readings from Last 3 Encounters:   24 286 lb 6 oz (129.9 kg)   10/24/24 254 lb (115.2 kg)   24 249 lb (112.9 kg)       Allergies:  Allergies[1]    Labs:  Lab Results   Component Value Date    WBC 10.8 2024    HGB 8.3 2024    HCT 26.0 2024    .0 2024    CREATSERUM 1.11 2024    BUN 22 2024     2024    K 4.0 2024     2024    CO2 24.0 2024     2024    CA 8.7 2024    MG 1.8 2024    PHOS 3.5 2024       Physical Exam:  Blood pressure 149/65, pulse 82, temperature 99 °F (37.2 °C), temperature source Temporal, resp. rate 19, height 5' 5\" (1.651 m), weight 286 lb 6 oz (129.9 kg), SpO2 99%, not currently breastfeeding.  General: NAD  Neck: No JVD  Lungs: Coarse bilaterally  Heart: RRR, S1, S2  Abdomen: Softer/Obese, NT/ND, BS+x4/+BM (loose today)  Extremities: Warm, dry, 1+ generalized UE & LE edema bilat  Pulses: 2+ bilat DP  Skin:  sternotomy incision CATHIE=CDI-healing well. Left femoral area incision intact w/mild superficial skin excoriation.   Neurological: awake/calm/cooperative/nods head to simple questions/moves all ext/follow simple commands. Mouths words    Assessment/Plan:  S/P EMERGENT AORTIC DISSECTION REPAIR POD # 25  Respiratory Failure w/multiple failed SBT prior to trach placement -currently on full vent support-continue daily weaning trials w/mgt per Pulm. Re-intubated on 11/22 per Pulm after emesis episode/ETT removal due to emesis content noted in airway.    S/P Trach placement per ENT on 12/4 w/Trach changed 12/10   Completed ABX course for suspected aspiration pneumonia post op.  ABX re-started on 12/9 after emesis episode on 12/9: concern for aspiration.   +BM (s) post op however pt w/episode of emesis on 12/9 w/stool content noted/+high PEG residuals. TF continue on hold-trickle feeding okay per GI.  Pt w/intermittent episodes of emesis. Mgt per GI. + loose stools today. Pt denies abdominal tenderness.   Hx: HTN: on multiple antihypertensives including IV Cardene & IV Labetolol-weaning as able:SBP goal= <130/MAP >70. Mgt per Cards. Can wean once able to take meds through PEG.  New A-line placed 12/2-plan to remove today.   New PICC placed 12/2  Pain meds as needed-avoid/limit narcotics w/recent constipation/emesis episodes/dilated bowel loops post op  Scds/Heparin SubQ prophylaxis DVT prevention. HIPA 11/25=negative. Plts continue >100,000  Continue ICU status  Hx: CKD. Expected post op volume overload w/mgt per Nephrology (consulted on 11/23) for NAIMA: CR continues to improve. Limit/avoid nephrotoxic meds: Lasix per Nephrology. Aguilar remains for close I/O monitoring/immobility-new aguilar placed 12/9 then again 12/10 due to obstruction. Kidney US w/doppler on 12/8.   Expected post op anemia: w/o clinical significance/stable. May be slightly diluted due to volume overload.   Hx: Morbid obesity w/BMI >40-plan for RD to see when  appropriate  Nutrition per TPN. PEG placed 12/5 per GI.  PEG to LIS. Mgt per GI.  Hx: ETOH abuse-monitor for withdrawals-none noted.    Psych consulted 12/12 to assist w/med mgt-follow recs  Discharge planning: pt lives alone and has supportive family.   Anticipate LTAC at discharge: referrals sent proactively by  on 11/29 for LTAC.  Anticipate ready for LTAC next few days once GI concerns resolve and weaned off IV Cardene & Labetalol.     Plan of care discussed w/patient/RN/Dr. Marysol Noel, APRN  12/17/2024  8:44 AM         [1]   Allergies  Allergen Reactions    Atorvastatin MYALGIA    Pravastatin MYALGIA    Rosuvastatin MYALGIA    Seasonal Runny nose

## 2024-12-17 NOTE — PROGRESS NOTES
[facility]  part of [facility]     Progress Note    Alisha Wilde Patient Status:  Inpatient    10/25/1965 MRN [id number]   Location [facility] 2W/SW Attending Aguila Villegas MD   Hosp Day # 25 PCP Bridger Avendano MD       Subjective:   Patient seen and examined.  Resting in bed.  Episode of emesis with worsening bradycardia and respiratory distress noted yesterday.  Arousable following commands this morning.  Off Precedex.  Still requiring labetalol and Cardene.  Objective:   Blood pressure 154/70, pulse 82, temperature 99 °F (37.2 °C), temperature source Temporal, resp. rate 18, height 5' 5\" (1.651 m), weight 286 lb 6 oz (129.9 kg), SpO2 100%, not currently breastfeeding.  Intake/Output:   Last 3 shifts: I/O last 3 completed shifts:  In: 6494.1 [I.V.:4037.2; IV PIGGYBACK:400]  Out: 9450 [Urine:8650; Emesis/NG output:800]   This shift: I/O this shift:  In: -   Out: 850 [Urine:850]     Vent Settings: Vent Mode: PRVC/AC  FiO2 (%):  [30 %] 30 %  S RR:  [18] 18  S VT:  [550 mL] 550 mL  PEEP/CPAP (cm H2O):  [5 cm H20] 5 cm H20  MAP (cm H2O):  [11-14] 13    Hemodynamic parameters (last 24 hours):      Scheduled Meds:   Current Facility-Administered Medications   Medication Dose Route Frequency    magnesium sulfate in sterile water for injection 2 g/50mL IVPB premix 2 g  2 g Intravenous Once    pantoprazole (Protonix) 40 mg in sodium chloride 0.9% PF 10 mL IV push  40 mg Intravenous Daily    furosemide (Lasix) 10 mg/mL injection 40 mg  40 mg Intravenous BID (Diuretic)    adult 3 in 1 TPN   Intravenous Continuous TPN    dextrose 10% infusion (TPN no rate)   Intravenous Continuous PRN    pancrelipase (Lip-Prot-Amyl) (Zenpep) DR particles cap 10,000 Units  10,000 Units Per G Tube PRN    And    sodium bicarbonate tab 325 mg  325 mg Oral PRN    LORazepam (Ativan) 2 mg/mL injection 0.5 mg  0.5 mg Intravenous QID    LORazepam (Ativan) 2 mg/mL injection 1 mg  1 mg Intravenous Q4H PRN     labetalol (Trandate) 400 mg in sodium chloride 0.9% 200 mL infusion  0.5-10 mg/min Intravenous Continuous    haloperidol lactate (Haldol) 5 MG/ML injection 2 mg  2 mg Intravenous Q6H PRN    dextrose 10% infusion (TPN no rate)   Intravenous Continuous PRN    fentaNYL (Sublimaze) 50 mcg/mL injection 25 mcg  25 mcg Intravenous Q2H PRN    fentaNYL (Sublimaze) 50 mcg/mL injection 50 mcg  50 mcg Intravenous Q2H PRN    bisacodyl (Dulcolax) 10 MG rectal suppository 10 mg  10 mg Rectal Daily PRN    dexmedeTOMIDine (Precedex) 800 mcg in sodium chloride 0.9% 100 mL infusion  0.2-1.5 mcg/kg/hr (Dosing Weight) Intravenous Continuous    acetaminophen (Ofirmev) 10 mg/mL infusion premix 1,000 mg  1,000 mg Intravenous Q6H PRN    cloNIDine (Catapres) 0.3 MG/24HR patch 1 patch  1 patch Transdermal Weekly    sodium chloride 3 % nebulizer solution 3 mL  3 mL Nebulization PRN    albuterol (Ventolin) (2.5 MG/3ML) 0.083% nebulizer solution 2.5 mg  2.5 mg Nebulization Q6H PRN    piperacillin-tazobactam (Zosyn) 3.375 g in dextrose 5% 100 mL IVPB-ADDV  3.375 g Intravenous Q8H    hydrALAzine (Apresoline) 20 mg/mL injection 10 mg  10 mg Intravenous Q4H PRN    carvedilol (Coreg) tab 37.5 mg  37.5 mg Oral BID with meals    acetaminophen (Tylenol) 160 MG/5ML oral liquid 650 mg  650 mg Oral Q6H PRN    aspirin chewable tab 81 mg  81 mg Peg Tube Daily    niCARdipine (carDENE) 125 mg in sodium chloride 0.9% 250 mL infusion  5-15 mg/hr Intravenous Continuous    isosorbide dinitrate (Isordil) tab 40 mg  40 mg Per NG Tube TID (Nitrates)    hydrALAZINE (Apresoline) tab 150 mg  150 mg Oral Q8H GABRIEL    amLODIPine (Norvasc) tab 10 mg  10 mg Oral Daily    ipratropium-albuterol (Duoneb) 0.5-2.5 (3) MG/3ML inhalation solution 3 mL  3 mL Nebulization Q6H PRN    HYDROcodone-acetaminophen (Norco) 5-325 MG per tab 2 tablet  2 tablet Oral Q4H PRN    heparin (Porcine) 5000 UNIT/ML injection 5,000 Units  5,000 Units Subcutaneous Q8H GABRIEL    melatonin tab 3 mg  3 mg Oral  Nightly PRN    potassium chloride 20 mEq/100mL IVPB premix 20 mEq  20 mEq Intravenous PRN    Or    potassium chloride 40 mEq/100mL IVPB premix (central line) 40 mEq  40 mEq Intravenous PRN    magnesium sulfate in dextrose 5% 1 g/100mL infusion premix 1 g  1 g Intravenous PRN    magnesium sulfate in sterile water for injection 2 g/50mL IVPB premix 2 g  2 g Intravenous PRN    chlorhexidine gluconate (Peridex) 0.12 % oral solution 15 mL  15 mL Mouth/Throat BID       Continuous Infusions:    adult 3 in 1 TPN 83.3 mL/hr at 12/16/24 2038    dextrose 10%      labetalol (Trandate) 400 mg in sodium chloride 0.9% 200 mL infusion 0.5 mg/min (12/17/24 0745)    dextrose 10%      dexmedetomidine Stopped (12/17/24 0600)    niCARdipine 15 mg/hr (12/17/24 0819)       Physical Exam  Constitutional: no acute distress, arousable  Eyes: PERRL  ENT: nares pateint + tracheostomy in place  Neck: supple, no JVD  Cardio: RRR, S1 S2  Respiratory: Bilateral crackles  GI: abdomen soft, non tender, active bowel sounds, no organomegaly + PEG tube in place  Extremities: no clubbing, cyanosis, edema  Neurologic: no gross motor deficits  Skin: warm, dry      Results:     Lab Results   Component Value Date    WBC 10.8 12/17/2024    HGB 8.3 12/17/2024    HCT 26.0 12/17/2024    .0 12/17/2024    CREATSERUM 1.11 12/17/2024    BUN 22 12/17/2024     12/17/2024    K 4.0 12/17/2024     12/17/2024    CO2 24.0 12/17/2024     12/17/2024    CA 8.7 12/17/2024    MG 1.8 12/17/2024    PHOS 3.5 12/17/2024       XR CHEST AP PORTABLE  (CPT=71045)    Result Date: 12/16/2024  CONCLUSION: Right basal pulmonary opacity suggesting pneumonia.  No significant interval change since most recent exam from 12/15/24.  Findings are worse since 12/12/24.    Dictated by (CST): Jagjit Jin MD on 12/16/2024 at 10:19 AM     Finalized by (CST): Jagjit Jin MD on 12/16/2024 at 10:20 AM          XR ABDOMEN (1 VIEW) (CPT=74018)    Result Date:  12/16/2024  CONCLUSION: Nonspecific-nonobstructive bowel gas pattern.  Dilated bowel loops demonstrated on preceding exam is not redemonstrated.    Dictated by (CST): Jagjit Jin MD on 12/16/2024 at 10:16 AM     Finalized by (CST): Jagjit Jin MD on 12/16/2024 at 10:18 AM                 Assessment   1.  Type A aortic dissection status postrepair  2.  Acute hypoxemic respiratory failure  3.  Acute kidney injury on chronic kidney disease  4.  Aspiration pneumonia  5.  Anemia   6.  Prior nicotine dependence  7.  EtOH abuse  8.  Hypertension  9.  Nausea/vomiting     Plan   -Patient presenting with evidence of type a aortic dissection status post repair by CV surgery on 11/21/2024.  -Postoperative respiratory failure complicated by self extubation with emesis and aspiration noted afterwards requiring reintubation.  -Status post tracheostomy on 12/4/2024 and subsequent PEG on 12/5/2024.  -Spontaneous breathing trial as tolerated today  -CT abdomen pelvis on 12/9/2024 with dilated fluid-filled loops of small bowel.  PEG tube placed to low intermittent suction.  Hopefully may be able to start trickle feedings.  Patient on TPN in the meantime  -Weaned off propofol at this time.  -Attempt to wean labetalol and  Cardene  -Attempt to wean Cardene  -Frequent suctioning.  Bronchial hygiene.    -DVT prophylaxis: Heparin  -Discussed with nursing staff and CV surgery team.  If stabilizing from GI perspective possible discharge to LTAC later this week        Dmitri Herman, DO  Pulmonary Critical Care Medicine  [facility]

## 2024-12-17 NOTE — PROGRESS NOTES
[facility]     Gastroenterology Progress Note    Alisha Wilde Patient Status:  Inpatient    10/25/1965 MRN [id number]   Location [facility] 2W/SW Attending Missy Paulson MD   Hosp Day # 25 PCP Bridger Avendano MD       Subjective:    Had another large bm this morning  She denies abdominal pain.  Possible episodes of emesis overnight  Objective:   Blood pressure 134/68, pulse 90, temperature 97.9 °F (36.6 °C), temperature source Axillary, resp. rate (!) 28, height 5' 5\" (1.651 m), weight 286 lb 6 oz (129.9 kg), SpO2 99%, not currently breastfeeding. Body mass index is 47.66 kg/m².    Gen:awake, no acute distress  HEENT: mucus membranes appear moist, trach to vent  CV: RRR  Lung: mechanical breath sounds  Abdomen: soft NT, seems less distended today, peg luq  Skin: dry, warm, no jaundice  Ext: +edema  Neuro: appropriate    Assessment and Plan:   Alisha Wilde is a 59 year old female w/ PMHx of hypertension, GERD, CKD, hyperlipidemia who presented to ER 2024 for chest pain. Underwent emergent repair of aortic type A dissection on 2024. Inability to wean from the ventillator now s/p trach and PEG placement -.       # Prolonged sb ileus, improved  -s/p PEG   -CT abdomen pelvis 12/10 with dilated fluid-filled loops of small bowel, no transition point  -output from PEG appears to have slowed down   -KUB shows no further dilation of sb which is an improvement  - will add scheduled reglan to promote gastric motility   -will retry trickle feeds tomorrow morning  - ok to use peg for meds  -remains on TPN  -Avoid dysmotility agents  -appreciate surgery input    Toma Barrientos MD      Results:     Lab Results   Component Value Date    WBC 10.8 2024    HGB 8.3 (L) 2024    HCT 26.0 (L) 2024    .0 2024    CREATSERUM 1.11 (H) 2024    BUN 22 2024     2024    K 4.0 2024     2024    CO2 24.0 2024      (H) 12/17/2024    CA 8.7 12/17/2024    ALB 3.2 12/15/2024    ALKPHO 68 12/15/2024    BILT 0.6 12/15/2024    TP 5.7 12/15/2024    AST 25 12/15/2024    ALT 23 12/15/2024    PTT 30.7 12/04/2024    INR 1.17 12/04/2024    TSH 2.085 12/07/2024    NATHALIE 99 11/25/2024    LIP 28 11/25/2024    DDIMER 0.42 12/08/2019    ESRML 15 06/05/2021    MG 1.8 12/17/2024    PHOS 3.5 12/17/2024    TROP <0.045 12/08/2019     (H) 09/23/2021    B12 1,156 (H) 07/23/2018       XR CHEST AP PORTABLE  (CPT=71045)    Result Date: 12/16/2024  CONCLUSION: Right basal pulmonary opacity suggesting pneumonia.  No significant interval change since most recent exam from 12/15/24.  Findings are worse since 12/12/24.    Dictated by (CST): Jagjit Jin MD on 12/16/2024 at 10:19 AM     Finalized by (CST): Jagjit Jin MD on 12/16/2024 at 10:20 AM          XR ABDOMEN (1 VIEW) (CPT=74018)    Result Date: 12/16/2024  CONCLUSION: Nonspecific-nonobstructive bowel gas pattern.  Dilated bowel loops demonstrated on preceding exam is not redemonstrated.    Dictated by (CST): Jagjit Jin MD on 12/16/2024 at 10:16 AM     Finalized by (CST): Jagjit Jin MD on 12/16/2024 at 10:18 AM

## 2024-12-17 NOTE — PROGRESS NOTES
Patient is a 59 year old -American, single female with past medical history of CKD, hypertension, high cholesterol who was admitted to the hospital for aortic dissection. The patient has been demonstrating alternation in mood and cognition with episodes of increased restlessness and agitation. According to team, they are trying to wean patient of propofol and precedex. Patient indicated for psych consult for evaluation and advise.  Consult Duration     The patient seen for over 35-minute, follow-up evaluation, over 50% counseling and coordinating care addressing agitation, restlessness, medication management..    Record reviewed, communication with attending, communication with RN and patient seen face to face evaluation.  History of Present Illness:     According to the team the patient has been doing well.  The patient is off of Precedex.  The patient had 2 bowel movements last night but also patient has been vomiting.    The patient is seen today in her room.  The patient is calm and cooperative.  Patient reporting that she is not panicky but she just want to know about her medical condition.  Patient reporting that she has been sleeping okay.    Patient has not been demonstrating any agitation, restlessness or any bizarre behavior and cooperating in the treatment plan.    The patient has not been taking any Haldol for the last 2 days.  Patient taking small dosage of Ativan.    The patient denying any homicidal or suicidal ideation.  The patient denying any auditory or visual hallucination.      Provided patient with supportive psychotherapy including emotional support and encouragement.    Communicate with the team to work on lowering sedation with Precedex.    Past Medical History  Past Medical History:    Chronic kidney disease (CKD)    Esophageal reflux    High blood pressure    High cholesterol    HYPERTENSION    Hypertension    Unspecified essential hypertension       Past Surgical History  Past  Surgical History:   Procedure Laterality Date    Colonoscopy N/A 2018    Procedure: COLONOSCOPY;  Surgeon: Magdy Webber MD;  [facility] ENDOSCOPY    Endometrial ablation      child passed away for 5 mins          1    Other surgical history  11    cysto-Dr. Lacey -- pt denies       Family History  Family History   Problem Relation Age of Onset    Hypertension Mother     Heart Disorder Mother 70    Other (Other) Mother         kidney failure    Hypertension Father     Hypertension Maternal Grandfather     Cancer Neg     Diabetes Neg     Glaucoma Neg     Macular degeneration Neg        Social History  Social History     Socioeconomic History    Marital status: Single   Tobacco Use    Smoking status: Former     Current packs/day: 0.00     Average packs/day: 1 pack/day for 13.0 years (13.0 ttl pk-yrs)     Types: Cigarettes     Start date:      Quit date:      Years since quittin.9    Smokeless tobacco: Former   Vaping Use    Vaping status: Never Used   Substance and Sexual Activity    Alcohol use: Yes     Alcohol/week: 1.0 - 2.0 standard drink of alcohol     Types: 1 - 2 Cans of beer per week     Comment: every day    Drug use: No     Social Drivers of Health     Food Insecurity: Unknown (2024)    Food Insecurity     Food Insecurity: Patient unable to answer   Transportation Needs: Unknown (2024)    Transportation Needs     Lack of Transportation: Patient unable to answer   Housing Stability: Unknown (2024)    Housing Stability     Housing Instability: Patient unable to answer           Current Medications:  Current Facility-Administered Medications   Medication Dose Route Frequency    adult 3 in 1 TPN   Intravenous Continuous TPN    metoclopramide (Reglan) 5 mg/mL injection 10 mg  10 mg Intravenous Q6H    pantoprazole (Protonix) 40 mg in sodium chloride 0.9% PF 10 mL IV push  40 mg Intravenous Daily    furosemide (Lasix) 10 mg/mL injection 40 mg  40 mg  Intravenous BID (Diuretic)    adult 3 in 1 TPN   Intravenous Continuous TPN    dextrose 10% infusion (TPN no rate)   Intravenous Continuous PRN    pancrelipase (Lip-Prot-Amyl) (Zenpep) DR particles cap 10,000 Units  10,000 Units Per G Tube PRN    And    sodium bicarbonate tab 325 mg  325 mg Oral PRN    LORazepam (Ativan) 2 mg/mL injection 0.5 mg  0.5 mg Intravenous QID    LORazepam (Ativan) 2 mg/mL injection 1 mg  1 mg Intravenous Q4H PRN    labetalol (Trandate) 400 mg in sodium chloride 0.9% 200 mL infusion  0.5-10 mg/min Intravenous Continuous    dextrose 10% infusion (TPN no rate)   Intravenous Continuous PRN    fentaNYL (Sublimaze) 50 mcg/mL injection 25 mcg  25 mcg Intravenous Q2H PRN    fentaNYL (Sublimaze) 50 mcg/mL injection 50 mcg  50 mcg Intravenous Q2H PRN    bisacodyl (Dulcolax) 10 MG rectal suppository 10 mg  10 mg Rectal Daily PRN    dexmedeTOMIDine (Precedex) 800 mcg in sodium chloride 0.9% 100 mL infusion  0.2-1.5 mcg/kg/hr (Dosing Weight) Intravenous Continuous    acetaminophen (Ofirmev) 10 mg/mL infusion premix 1,000 mg  1,000 mg Intravenous Q6H PRN    cloNIDine (Catapres) 0.3 MG/24HR patch 1 patch  1 patch Transdermal Weekly    sodium chloride 3 % nebulizer solution 3 mL  3 mL Nebulization PRN    albuterol (Ventolin) (2.5 MG/3ML) 0.083% nebulizer solution 2.5 mg  2.5 mg Nebulization Q6H PRN    piperacillin-tazobactam (Zosyn) 3.375 g in dextrose 5% 100 mL IVPB-ADDV  3.375 g Intravenous Q8H    hydrALAzine (Apresoline) 20 mg/mL injection 10 mg  10 mg Intravenous Q4H PRN    carvedilol (Coreg) tab 37.5 mg  37.5 mg Oral BID with meals    acetaminophen (Tylenol) 160 MG/5ML oral liquid 650 mg  650 mg Oral Q6H PRN    aspirin chewable tab 81 mg  81 mg Peg Tube Daily    niCARdipine (carDENE) 125 mg in sodium chloride 0.9% 250 mL infusion  5-15 mg/hr Intravenous Continuous    isosorbide dinitrate (Isordil) tab 40 mg  40 mg Per NG Tube TID (Nitrates)    hydrALAZINE (Apresoline) tab 150 mg  150 mg Oral Q8H GABRIEL     amLODIPine (Norvasc) tab 10 mg  10 mg Oral Daily    ipratropium-albuterol (Duoneb) 0.5-2.5 (3) MG/3ML inhalation solution 3 mL  3 mL Nebulization Q6H PRN    HYDROcodone-acetaminophen (Norco) 5-325 MG per tab 2 tablet  2 tablet Oral Q4H PRN    heparin (Porcine) 5000 UNIT/ML injection 5,000 Units  5,000 Units Subcutaneous Q8H GABRIEL    melatonin tab 3 mg  3 mg Oral Nightly PRN    potassium chloride 20 mEq/100mL IVPB premix 20 mEq  20 mEq Intravenous PRN    Or    potassium chloride 40 mEq/100mL IVPB premix (central line) 40 mEq  40 mEq Intravenous PRN    magnesium sulfate in dextrose 5% 1 g/100mL infusion premix 1 g  1 g Intravenous PRN    magnesium sulfate in sterile water for injection 2 g/50mL IVPB premix 2 g  2 g Intravenous PRN    chlorhexidine gluconate (Peridex) 0.12 % oral solution 15 mL  15 mL Mouth/Throat BID     Medications Prior to Admission   Medication Sig    Acetaminophen ER (TYLENOL 8 HOUR) 650 MG Oral Tab CR Take 2 tablets (1,300 mg total) by mouth daily as needed.    Calcium Carb-Cholecalciferol 600-10 MG-MCG Oral Tab Take 1 tablet by mouth daily.    metoprolol succinate ER 50 MG Oral Tablet 24 Hr Take 1 tablet (50 mg total) by mouth daily.    Losartan Potassium-HCTZ 100-12.5 MG Oral Tab Take 1 tablet by mouth daily.    Potassium Chloride ER 20 MEQ Oral Tab CR Take 1 tablet by mouth daily.    albuterol 108 (90 Base) MCG/ACT Inhalation Aero Soln Inhale 2 puffs into the lungs every 4 (four) hours as needed for Wheezing.    amLODIPine 10 MG Oral Tab Take 1 tablet (10 mg total) by mouth daily.    Ascorbic Acid (VITAMIN C) 1000 MG Oral Tab Take 1 tablet (1,000 mg total) by mouth daily.    vitamin B-12 50 MCG Oral Tab Take 1 tablet (50 mcg total) by mouth daily.    fluticasone propionate 50 MCG/ACT Nasal Suspension 2 sprays by Nasal route daily.    fluticasone-salmeterol 250-50 MCG/ACT Inhalation Aerosol Powder, Breath Activated Inhale 1 puff into the lungs 2 (two) times daily. inhale 1 puff by INHALATION  route 2 times every day morning and evening approximately 12 hours apart (Patient not taking: Reported on 11/21/2024)       Allergies  Allergies[1]    Review of Systems:   As by Admitting/Attending    Results:   Laboratory Data:  Lab Results   Component Value Date    WBC 10.8 12/17/2024    HGB 8.3 (L) 12/17/2024    HCT 26.0 (L) 12/17/2024    .0 12/17/2024    CREATSERUM 1.11 (H) 12/17/2024    BUN 22 12/17/2024     12/17/2024    K 4.0 12/17/2024     12/17/2024    CO2 24.0 12/17/2024     (H) 12/17/2024    CA 8.7 12/17/2024    ALB 3.2 12/15/2024    ALKPHO 68 12/15/2024    TP 5.7 12/15/2024    AST 25 12/15/2024    ALT 23 12/15/2024    PTT 30.7 12/04/2024    INR 1.17 12/04/2024    PTP 15.6 (H) 12/04/2024    TSH 2.085 12/07/2024    NATHALIE 99 11/25/2024    LIP 28 11/25/2024    DDIMER 0.42 12/08/2019    ESRML 15 06/05/2021    MG 1.8 12/17/2024    PHOS 3.5 12/17/2024    TROP <0.045 12/08/2019     (H) 09/23/2021    B12 1,156 (H) 07/23/2018         Imaging:  XR CHEST AP PORTABLE  (CPT=71045)    Result Date: 12/16/2024  CONCLUSION: Right basal pulmonary opacity suggesting pneumonia.  No significant interval change since most recent exam from 12/15/24.  Findings are worse since 12/12/24.    Dictated by (CST): Jagjit Jin MD on 12/16/2024 at 10:19 AM     Finalized by (CST): Jagjit Jin MD on 12/16/2024 at 10:20 AM          XR ABDOMEN (1 VIEW) (CPT=74018)    Result Date: 12/16/2024  CONCLUSION: Nonspecific-nonobstructive bowel gas pattern.  Dilated bowel loops demonstrated on preceding exam is not redemonstrated.    Dictated by (CST): Jagjit Jin MD on 12/16/2024 at 10:16 AM     Finalized by (CST): Jagjit Jin MD on 12/16/2024 at 10:18 AM          XR CHEST AP PORTABLE  (CPT=71045)    Result Date: 12/15/2024  CONCLUSION:   Right greater than left patchy bilateral lung opacity which may be mildly increased in the right lung base.    Dictated by (CST): Javid Watts MD on 12/15/2024 at 7:23 AM      Finalized by (CST): Javid Watts MD on 12/15/2024 at 7:24 AM           Vital Signs:   Blood pressure 146/73, pulse 91, temperature 97.9 °F (36.6 °C), temperature source Axillary, resp. rate 22, height 65\", weight 129.9 kg (286 lb 6 oz), SpO2 99%, not currently breastfeeding.    Mental Status Exam:   Appearance: Stated age female, in hospital gown, laying down in hospital bed.  Psychomotor: No psychomotor agitation reported or observed.  Some psychomotor slowness.  Orientation: Alert and oriented to person and hospital.  Patient cooperativeness treatment plan.  Gait: Not evaluated.  Attitude/Coorperation: Patient presents with a smile and cooperative.  Behavior: No episode of agitation reported.  Patient sleeping most of the time.  Speech: Patient spoke with some dysarthric speech but understandable.  Mood: Patient has been difficulty expressing her emotion  Affect: Anxious and irritable affect.  Thought process: Noticeably distracted.  Thought content: No reports of  suicidal or homicidal ideation.  Perceptions: Patient denied any auditory or visual hallucination.  Concentration: Grossly impaired  Memory: Grossly impaired  Intellect: Average.  Judgment and Insight: Questionable.     Impression:   Delirium due to another medical condition.  Episodic mood disorder  Agitation    Dissection of ascending aorta (HCC)    NAIMA (acute kidney injury) (HCC)    Stage 3 chronic kidney disease (HCC)    Acute respiratory failure with hypoxia (HCC)    Hypervolemia    Ileus (HCC)      Patient is a 59 year old -American, single female with past medical history of CKD, hypertension, high cholesterol who was admitted to the hospital for aortic dissection. The patient has been demonstrating alternation in mood and cognition with episodes of increased restlessness and agitation. According to team, they are trying to wean patient of propofol and precedex.     12/13/2024: patient remains on precedex. Continues to have episodes  of increased restlessness and agitation. Haldol given this morning.     12/14/2024: The patient has been suffering from severe anxiety, confusion, challenging medical condition and delirious episode caused more paranoia and agitation.  Presents is reasonable approach but stabilizing the mood with antipsychotic medication in the appropriate approach at this point.    12/15/2024: The patient has been demonstrating improvement to the addition of Haldol with decrease in the Precedex.    12/16/2024: The patient continue demonstrating slight improvement with no agitation, no hallucination but some anxiety and slight confusion about her medical condition.  Precedex is 0.4 can be lowered gradually.    12/17/2024: The patient has been demonstrating improvement to her delirious episode and has not been on Precedex, Haldol and did not indicate any medication as needed or fentanyl.  Continue current management and sign off.    Discussed risk and benefit, acknowledging the current symptom and severity.  At this point, I would recommend the following approach:     Focus on safety  Focus on education and support.  Focus on insight orientation helping the patient understand diagnosis and treatment plan.  Continue Ativan 0.5 mg IV every 6 hours scheduled.  Discontinue Haldol 2 mg IV 3 times daily scheduled.  Utilize ativan 1 mg IV q 4 hours PRN for anxiety  Someone discontinue haldol 2 mg IV q 6 hours PRN for agitation?.  Sign off the case  Processed with patient at length, the initiation of the above psychotropic medications I advised the patient of the risks, benefits, alternatives and potential side effects. The patient consents to administration of the medications and understands the right to refuse medications at any time. The patient verbalized understanding.   Coordinate plan with team    Orders This Visit:  Orders Placed This Encounter   Procedures    Troponin I (High Sensitivity)    CBC With Differential With Platelet     Basic Metabolic Panel (8)    Hepatic Function Panel (7)    PTT, Activated    Prothrombin Time (PT)    Pro Beta Natriuretic Peptide    Open heart surgical profile    CBC, Platelet; No Differential    CBC With Differential With Platelet    Prothrombin Time (PT)    PTT, Activated    Fibrinogen Activity    Expanded Arterial Blood Gas    Expanded Arterial Blood Gas    Arterial blood gas    CBC, Platelet; No Differential    Basic Metabolic Panel (8)    Magnesium    Comp Metabolic Panel (14)    Magnesium    Expanded Arterial Blood Gas    Arterial blood gas    CBC, Platelet; No Differential    Magnesium    Renal Function Panel    Urinalysis, Routine    Microalb/Creat Ratio, Random Urine    Comp Metabolic Panel (14)    Magnesium    CBC With Differential With Platelet    Phosphorus    CBC, Platelet; No Differential    Heparin Induced Platelet    Expanded Arterial Blood Gas    Magnesium    Renal Function Panel    Lipase    Amylase    Lactic Acid, Plasma    Basic Metabolic Panel (8)    Potassium    Lipid Panel    Basic Metabolic Panel (8)    CBC, Platelet; No Differential    CBC, Platelet; No Differential    Magnesium    Renal Function Panel    CBC, Platelet; No Differential    Triglycerides    Magnesium    CBC With Differential With Platelet    Renal Function Panel    Manual differential    CBC With Differential With Platelet    Procalcitonin    Manual differential    CBC With Differential With Platelet    Renal Function Panel    Magnesium    Manual differential    REDRAW RENAL    Magnesium    CBC With Differential With Platelet    Comp Metabolic Panel (14)    Iron And Tibc    Phosphorus    Manual differential    Expanded Arterial Blood Gas    Magnesium    CBC With Differential With Platelet    Renal Function Panel    PTT, Activated    Prothrombin Time (PT)    Manual differential    CBC, Platelet; No Differential    Comp Metabolic Panel (14)    Lipid Panel    Expanded Arterial Blood Gas    Basic Metabolic Panel (8)    CBC,  Platelet; No Differential    Magnesium    Renal Function Panel    CBC With Differential With Platelet    Magnesium    Renal Function Panel    CBC With Differential With Platelet    Potassium    TSH W Reflex To Free T4    Magnesium    CBC With Differential With Platelet    Renal Function Panel    Scan slide    Magnesium    Phosphorus    CBC With Differential With Platelet    Comp Metabolic Panel (14)    REDRAW CMP (P)    Basic Metabolic Panel (8)    CBC, Platelet; No Differential    Basic Metabolic Panel (8)    CBC, Platelet; No Differential    Basic Metabolic Panel (8)    REDRAW BMP    Potassium    Basic Metabolic Panel (8)    CBC With Differential With Platelet    Scan slide    Potassium    RBC Morphology Scan    Magnesium    Phosphorus    Basic Metabolic Panel (8)    Comp Metabolic Panel (14)    Magnesium    Phosphorus    Triglycerides    Triglycerides    Potassium    CBC, Platelet; No Differential    CBC, Platelet; No Differential    Potassium    Vancomycin Peak, Serum    CBC, Platelet; No Differential    Comp Metabolic Panel (14)    Hemoglobin & Hematocrit    CBC, Platelet; No Differential    Magnesium    Phosphorus    Basic Metabolic Panel (8)    Magnesium    Phosphorus    CBC With Differential With Platelet    Manual differential    Magnesium    CBC With Differential With Platelet    Basic Metabolic Panel (8)    Phosphorus    Type and screen    ABORH (Blood Type)    Antibody Screen    ABORH Confirmation    Prepare platelets Once    Prepare fresh frozen plasma Once    Prepare RBC STAT    Prepare cryoprecipitate Once    Type and screen    Prepare RBC Once    ABORH (Blood Type)    Antibody Screen    Type and screen    Prepare RBC STAT    ABORH (Blood Type)    Antibody Screen    Type and screen    Prepare RBC STAT    ABORH (Blood Type)    Antibody Screen    Specimen to Pathology Tissue    Rapid SARS-CoV-2 by PCR    MRSA Screen by PCR    Sputum culture    Sputum culture       Meds This Visit:  Requested  Prescriptions      No prescriptions requested or ordered in this encounter         Armen Dudley MD  12/17/2024    Note to Patient: The 21st Century Cures Act makes medical notes like these available to patients in the interest of transparency. However, be advised this is a medical document. It is intended as peer to peer communication. It is written in medical language and may contain abbreviations or verbiage that are unfamiliar. It may appear blunt or direct. Medical documents are intended to carry relevant information, facts as evident, and the clinical opinion of the practitioner. This note may have been transcribed using a voice dictation system. Voice recognition errors may occur. This should not be taken to alter the content or meaning of this note.           [1]   Allergies  Allergen Reactions    Atorvastatin MYALGIA    Pravastatin MYALGIA    Rosuvastatin MYALGIA    Seasonal Runny nose

## 2024-12-17 NOTE — PROGRESS NOTES
[facility]  part of [facility]    Cardiology Progress Note    Alisha Wilde Patient Status:  Inpatient    10/25/1965 MRN [id number]   Location [facility] 2W/SW Attending Aguila Villegas MD   Hosp Day # 25 PCP Bridger Avendano MD     Interval History   More awake/alert, following commands  Remains on Labetalol and Cardene gtt as PEG tube remains in LIS, on TPN    CXR with R basal pulmonary PNA, likely aspiration     Assessment and Plan:   Acute hypoxic respiratory failure  Multiple aspiration events, emesis  Aspiration PNA  Suspected ileus  Type A aortic dissection  NAIMA on CKD-III, improved  Obesity  EtOH abuse  -presented with acute onset CP   -CT with type A aortic dissection above right coronary artery and extending distally into the left common iliac artery and external iliac   -s/p emergent successful repair on 24  -course complicated by failure to extubated resulting in Trach/PEG tube placement and multiple aspiration events associated with ileus    -CXR on  with likely aspiration PNA    Hypertension  -TTE on  with hyperdynamic LVEF  -continue BP management - only on IV therapies as pt PEG tube to LIS due to recurrent emesis and aspiration events with underlying ileus   -holding coreg from 37.5 mg, clonidine  0.3 mg 3 times daily, combination hydralazine + isosorbide 150mg-40mg TID, amlodipine 10 mg daily   -continue nicardipine gtt   -continue labetalol gtt   -continue clonidine patch  -monitor rhythm on tele    Volume overload  HFpEF  -remains significantly overloaded on exam  -appreciate renal management of IV diuresis now that renal function has recovered    Objective:   Patient Vitals for the past 24 hrs:   BP Temp Temp src Pulse Resp SpO2 Weight   24 1500 119/66 -- -- 70 18 92 % --   24 1400 124/66 -- -- 71 18 95 % --   24 1300 122/66 -- -- 68 18 97 % --   24 1200 124/68 98.4 °F (36.9 °C) Axillary 68 18 97 % --   24 1100  127/75 -- -- 69 18 98 % --   11/26/24 1000 129/71 -- -- 69 18 97 % --   11/26/24 0900 128/69 -- -- 70 18 97 % --   11/26/24 0800 (!) 143/118 97.7 °F (36.5 °C) Temporal 70 18 98 % --   11/26/24 0700 130/70 -- -- 69 18 99 % --   11/26/24 0621 -- -- -- -- -- -- 282 lb 3 oz (128 kg)   11/26/24 0600 95/53 -- -- 68 21 97 % --   11/26/24 0500 121/67 -- -- 70 18 97 % --   11/26/24 0400 119/68 98.4 °F (36.9 °C) Temporal 72 18 98 % --   11/26/24 0200 109/60 -- -- 72 21 96 % --   11/26/24 0130 -- -- -- 72 18 97 % --   11/26/24 0100 142/73 -- -- 71 18 98 % --   11/26/24 0030 -- -- -- 71 18 99 % --   11/26/24 0000 139/70 98.3 °F (36.8 °C) Temporal 70 18 98 % --   11/25/24 2300 130/69 -- -- 72 18 98 % --   11/25/24 2200 135/70 -- -- 71 18 98 % --   11/25/24 2100 138/71 -- -- 71 18 99 % --   11/25/24 2000 132/68 98.1 °F (36.7 °C) Temporal 69 18 99 % --   11/25/24 1900 124/64 -- -- 71 18 98 % --   11/25/24 1800 136/72 -- -- 71 18 98 % --   11/25/24 1700 138/70 -- -- 70 18 98 % --       Intake/Output:   Last 3 shifts:   Intake/Output                   11/24/24 0700 - 11/25/24 0659 11/25/24 0700 - 11/26/24 0659 11/26/24 0700 - 11/27/24 0659       Intake    P.O.  0  0  --    P.O. 0 0 --    I.V.  2276.7  1884.9  --    I.V. 154 615 --    Volume (mL) Insulin 53.9 37 --    Volume (mL) Nitroglycerin 236.9 54.5 --    Volume (mL) Dobutamine 4.5 -- --    Volume (mL) Propofol 477.8 713.9 --    Volume (mL) Dexmedetomidine 352.2 384.5 --    Volume (mL) (dextrose 5%-sodium chloride 0.45% infusion) 997.4 80 --    NG/GT  50  150  60    Intake (mL) (NG/OG Tube Orogastric 16 Fr. Right mouth) 50 150 60    IV PIGGYBACK  600  500  --    Volume (mL) (piperacillin-tazobactam (Zosyn) 3.375 g in dextrose 5% 100 mL IVPB-ADDV) 300 200 --    Volume (mL) (acetaminophen (Ofirmev) 10 mg/mL infusion premix 1,000 mg) 200 100 --    Volume (mL) (potassium chloride 20 mEq/100mL IVPB premix 20 mEq) -- 100 --    Volume (mL) (potassium chloride 40 mEq/100mL IVPB premix  (central line) 40 mEq) 100 100 --    Total Intake 2926.7 2534.9 60       Output    Urine  1800  3570  800    Output (mL) (Urethral Catheter Latex;Temperature probe;Double-lumen) 1800 3570 800    Emesis/NG output  550  365  --    Residual volume (ml) (NG/OG Tube Orogastric 16 Fr. Right mouth) -- 5 --    Output (mL) (NG/OG Tube Orogastric 16 Fr. Right mouth) 550 360 --    Chest Tube  188  125  30    Output (mL) ([REMOVED] Chest Tube Anterior Mediastinal 32 Fr.) 48 50 --    Output (mL) (Chest Tube Anterior Pericardial 24 Fr.) 140 75 30    Total Output 2538 4060 830       Net I/O     388.7 -1525.1 -770             Vent Settings: Vent Mode: PRVC/AC  FiO2 (%):  [30 %] 30 %  S RR:  [18] 18  S VT:  [550 mL] 550 mL  PEEP/CPAP (cm H2O):  [5 cm H20] 5 cm H20  MAP (cm H2O):  [11-14] 11    Hemodynamic parameters (last 24 hours):      Scheduled Meds:    pantoprazole  40 mg Intravenous Daily    furosemide  40 mg Intravenous BID (Diuretic)    LORazepam  0.5 mg Intravenous QID    cloNIDine  1 patch Transdermal Weekly    piperacillin-tazobactam  3.375 g Intravenous Q8H    carvedilol  37.5 mg Oral BID with meals    aspirin  81 mg Peg Tube Daily    isosorbide dinitrate  40 mg Per NG Tube TID (Nitrates)    hydrALAZINE  150 mg Oral Q8H GABRIEL    amLODIPine  10 mg Oral Daily    heparin  5,000 Units Subcutaneous Q8H GABRIEL    chlorhexidine gluconate  15 mL Mouth/Throat BID       Continuous Infusions:    adult 3 in 1 TPN 83.3 mL/hr at 12/16/24 2038    dextrose 10%      labetalol (Trandate) 400 mg in sodium chloride 0.9% 200 mL infusion 0.5 mg/min (12/17/24 0745)    dextrose 10%      dexmedetomidine Stopped (12/17/24 0600)    niCARdipine 15 mg/hr (12/17/24 0819)       Results:     Lab Results   Component Value Date    WBC 10.8 12/17/2024    HGB 8.3 (L) 12/17/2024    HCT 26.0 (L) 12/17/2024    .0 12/17/2024    CREATSERUM 1.11 (H) 12/17/2024    BUN 22 12/17/2024     12/17/2024    K 4.0 12/17/2024     12/17/2024    CO2 24.0  12/17/2024     (H) 12/17/2024    CA 8.7 12/17/2024    ALB 3.2 12/15/2024    ALKPHO 68 12/15/2024    BILT 0.6 12/15/2024    TP 5.7 12/15/2024    AST 25 12/15/2024    ALT 23 12/15/2024    PTT 30.7 12/04/2024    INR 1.17 12/04/2024    TSH 2.085 12/07/2024    NATHALIE 99 11/25/2024    LIP 28 11/25/2024    DDIMER 0.42 12/08/2019    ESRML 15 06/05/2021    MG 1.8 12/17/2024    PHOS 3.5 12/17/2024    TROP <0.045 12/08/2019     (H) 09/23/2021    B12 1,156 (H) 07/23/2018       Recent Labs   Lab 12/16/24  0508 12/16/24  1129 12/17/24  0454   * 168* 157*   BUN 24* 24* 22   CREATSERUM 1.14* 1.19* 1.11*   CA 8.7 8.6* 8.7    140 141   K 4.4 4.5 4.0    110 109   CO2 24.0 23.0 24.0     Recent Labs   Lab 12/12/24  0443 12/13/24  0904 12/15/24  0445 12/16/24  0508 12/17/24  0454   RBC 2.95*   < > 2.98* 3.12* 3.13*   HGB 8.3*   < > 8.0* 8.5* 8.3*   HCT 25.3*   < > 25.2* 26.2* 26.0*   MCV 85.8   < > 84.6 84.0 83.1   MCH 28.1   < > 26.8 27.2 26.5   MCHC 32.8   < > 31.7 32.4 31.9   RDW 16.8*   < > 20.2* 19.5* 19.5*   NEPRELIM 3.64  --   --   --  6.60   WBC 6.5   < > 7.5 9.8 10.8   .0   < > 216.0 222.0 225.0    < > = values in this interval not displayed.       No results for input(s): \"BNPML\" in the last 168 hours.    No results for input(s): \"TROP\", \"CK\" in the last 168 hours.    XR CHEST AP PORTABLE  (CPT=71045)    Result Date: 12/16/2024  CONCLUSION: Right basal pulmonary opacity suggesting pneumonia.  No significant interval change since most recent exam from 12/15/24.  Findings are worse since 12/12/24.    Dictated by (CST): Jagjit Jin MD on 12/16/2024 at 10:19 AM     Finalized by (CST): Jagjit Jin MD on 12/16/2024 at 10:20 AM          XR ABDOMEN (1 VIEW) (CPT=74018)    Result Date: 12/16/2024  CONCLUSION: Nonspecific-nonobstructive bowel gas pattern.  Dilated bowel loops demonstrated on preceding exam is not redemonstrated.    Dictated by (CST): Jagjit Jin MD on 12/16/2024 at 10:16 AM      Finalized by (CST): Jagjit Jin MD on 12/16/2024 at 10:18 AM          XR CHEST AP PORTABLE  (CPT=71045)    Result Date: 12/15/2024  CONCLUSION:   Right greater than left patchy bilateral lung opacity which may be mildly increased in the right lung base.    Dictated by (CST): Javid Watts MD on 12/15/2024 at 7:23 AM     Finalized by (CST): Javid Watts MD on 12/15/2024 at 7:24 AM                    Exam:     Physical Exam:  General: awake/alert  HEENT: +trach  Neck: No JVD, carotids 2+, no bruits.  Cardiac: Regular rate and rhythm. S1, S2 normal.   Lungs: +rhonchi   Abdomen: Soft, non-tender.BS-present.  Extremities: Without clubbing or cyanosis. + BLE edema.    Neurologic: unable to obtain  Skin: Warm and dry.     Edward Montgomery,   [facility]

## 2024-12-17 NOTE — PLAN OF CARE
PEG - LIS per Dr. Alvarado. CHG x2, BM noted x2. Precedex stopped this AM.     Problem: Patient Centered Care  Goal: Patient preferences are identified and integrated in the patient's plan of care  Description: Interventions:  - What would you like us to know as we care for you? I work at [employer] and I am still very good friends with people from grade school! My brother, Osbaldo Prince, has been making decisions for me.  - Provide timely, complete, and accurate information to patient/family  - Incorporate patient and family knowledge, values, beliefs, and cultural backgrounds into the planning and delivery of care  - Encourage patient/family to participate in care and decision-making at the level they choose  - Honor patient and family perspectives and choices  Outcome: Progressing     Problem: Diabetes/Glucose Control  Goal: Glucose maintained within prescribed range  Description: INTERVENTIONS:  - Monitor Blood Glucose as ordered  - Assess for signs and symptoms of hyperglycemia and hypoglycemia  - Administer ordered medications to maintain glucose within target range  - Assess barriers to adequate nutritional intake and initiate nutrition consult as needed  - Instruct patient on self management of diabetes  Outcome: Progressing     Problem: Patient/Family Goals  Goal: Patient/Family Long Term Goal  Description: Patient's Long Term Goal: To discharge from the hospital safely    Interventions:  - surgical interventions, rounding, medications  - See additional Care Plan goals for specific interventions  Outcome: Progressing  Goal: Patient/Family Short Term Goal  Description: Patient's Short Term Goal: to breathe better    Interventions:   - manage the ventilator for pt until she is able to breath on her own.  - See additional Care Plan goals for specific interventions  Outcome: Progressing     Problem: CARDIOVASCULAR - ADULT  Goal: Maintains optimal cardiac output and hemodynamic stability  Description:  INTERVENTIONS:  - Monitor vital signs, rhythm, and trends  - Monitor for bleeding, hypotension and signs of decreased cardiac output  - Evaluate effectiveness of vasoactive medications to optimize hemodynamic stability  - Monitor arterial and/or venous puncture sites for bleeding and/or hematoma  - Assess quality of pulses, skin color and temperature  - Assess for signs of decreased coronary artery perfusion - ex. Angina  - Evaluate fluid balance, assess for edema, trend weights  Outcome: Progressing  Goal: Absence of cardiac arrhythmias or at baseline  Description: INTERVENTIONS:  - Continuous cardiac monitoring, monitor vital signs, obtain 12 lead EKG if indicated  - Evaluate effectiveness of antiarrhythmic and heart rate control medications as ordered  - Initiate emergency measures for life threatening arrhythmias  - Monitor electrolytes and administer replacement therapy as ordered  Outcome: Progressing     Problem: RESPIRATORY - ADULT  Goal: Achieves optimal ventilation and oxygenation  Description: INTERVENTIONS:  - Assess for changes in respiratory status  - Assess for changes in mentation and behavior  - Position to facilitate oxygenation and minimize respiratory effort  - Oxygen supplementation based on oxygen saturation or ABGs  - Provide Smoking Cessation handout, if applicable  - Encourage broncho-pulmonary hygiene including cough, deep breathe, Incentive Spirometry  - Assess the need for suctioning and perform as needed  - Assess and instruct to report SOB or any respiratory difficulty  - Respiratory Therapy support as indicated  - Manage/alleviate anxiety  - Monitor for signs/symptoms of CO2 retention  Outcome: Progressing     Problem: GASTROINTESTINAL - ADULT  Goal: Minimal or absence of nausea and vomiting  Description: INTERVENTIONS:  - Maintain adequate hydration with IV or PO as ordered and tolerated  - Nasogastric tube to low intermittent suction as ordered  - Evaluate effectiveness of ordered  antiemetic medications  - Provide nonpharmacologic comfort measures as appropriate  - Advance diet as tolerated, if ordered  - Obtain nutritional consult as needed  - Evaluate fluid balance  Outcome: Progressing  Goal: Maintains or returns to baseline bowel function  Description: INTERVENTIONS:  - Assess bowel function  - Maintain adequate hydration with IV or PO as ordered and tolerated  - Evaluate effectiveness of GI medications  - Encourage mobilization and activity  - Obtain nutritional consult as needed  - Establish a toileting routine/schedule  - Consider collaborating with pharmacy to review patient's medication profile  Outcome: Progressing     Problem: METABOLIC/FLUID AND ELECTROLYTES - ADULT  Goal: Glucose maintained within prescribed range  Description: INTERVENTIONS:  - Monitor Blood Glucose as ordered  - Assess for signs and symptoms of hyperglycemia and hypoglycemia  - Administer ordered medications to maintain glucose within target range  - Assess barriers to adequate nutritional intake and initiate nutrition consult as needed  - Instruct patient on self management of diabetes  Outcome: Progressing  Goal: Electrolytes maintained within normal limits  Description: INTERVENTIONS:  - Monitor labs and rhythm and assess patient for signs and symptoms of electrolyte imbalances  - Administer electrolyte replacement as ordered  - Monitor response to electrolyte replacements, including rhythm and repeat lab results as appropriate  - Fluid restriction as ordered  - Instruct patient on fluid and nutrition restrictions as appropriate  Outcome: Progressing  Goal: Hemodynamic stability and optimal renal function maintained  Description: INTERVENTIONS:  - Monitor labs and assess for signs and symptoms of volume excess or deficit  - Monitor intake, output and patient weight  - Monitor urine specific gravity, serum osmolarity and serum sodium as indicated or ordered  - Monitor response to interventions for patient's  volume status, including labs, urine output, blood pressure (other measures as available)  - Encourage oral intake as appropriate  - Instruct patient on fluid and nutrition restrictions as appropriate  Outcome: Progressing     Problem: SKIN/TISSUE INTEGRITY - ADULT  Goal: Skin integrity remains intact  Description: INTERVENTIONS  - Assess and document risk factors for pressure ulcer development  - Assess and document skin integrity  - Monitor for areas of redness and/or skin breakdown  - Initiate interventions, skin care algorithm/standards of care as needed  Outcome: Progressing  Goal: Incision(s), wounds(s) or drain site(s) healing without S/S of infection  Description: INTERVENTIONS:  - Assess and document risk factors for pressure ulcer development  - Assess and document skin integrity  - Assess and document dressing/incision, wound bed, drain sites and surrounding tissue  - Implement wound care per orders  - Initiate isolation precautions as appropriate  - Initiate Pressure Ulcer prevention bundle as indicated  Outcome: Progressing  Goal: Oral mucous membranes remain intact  Description: INTERVENTIONS  - Assess oral mucosa and hygiene practices  - Implement preventative oral hygiene regimen  - Implement oral medicated treatments as ordered  Outcome: Progressing     Problem: HEMATOLOGIC - ADULT  Goal: Maintains hematologic stability  Description: INTERVENTIONS  - Assess for signs and symptoms of bleeding or hemorrhage  - Monitor labs and vital signs for trends  - Administer supportive blood products/factors, fluids and medications as ordered and appropriate  - Administer supportive blood products/factors as ordered and appropriate  Outcome: Progressing     Problem: MUSCULOSKELETAL - ADULT  Goal: Return mobility to safest level of function  Description: INTERVENTIONS:  - Assess patient stability and activity tolerance for standing, transferring and ambulating w/ or w/o assistive devices  - Assist with transfers  and ambulation using safe patient handling equipment as needed  - Ensure adequate protection for wounds/incisions during mobilization  - Obtain PT/OT consults as needed  - Advance activity as appropriate  - Communicate ordered activity level and limitations with patient/family  Outcome: Progressing  Goal: Maintain proper alignment of affected body part  Description: INTERVENTIONS:  - Support and protect limb and body alignment per provider's orders  - Instruct and reinforce with patient and family use of appropriate assistive device and precautions (e.g. spinal or hip dislocation precautions)  Outcome: Progressing     Problem: NEUROLOGICAL - ADULT  Goal: Achieves stable or improved neurological status  Description: INTERVENTIONS  - Assess for and report changes in neurological status  - Initiate measures to prevent increased intracranial pressure  - Maintain blood pressure and fluid volume within ordered parameters to optimize cerebral perfusion and minimize risk of hemorrhage  - Monitor temperature, glucose, and sodium. Initiate appropriate interventions as ordered  Outcome: Progressing     Problem: PAIN - ADULT  Goal: Verbalizes/displays adequate comfort level or patient's stated pain goal  Description: INTERVENTIONS:  - Encourage pt to monitor pain and request assistance  - Assess pain using appropriate pain scale  - Administer analgesics based on type and severity of pain and evaluate response  - Implement non-pharmacological measures as appropriate and evaluate response  - Consider cultural and social influences on pain and pain management  - Manage/alleviate anxiety  - Utilize distraction and/or relaxation techniques  - Monitor for opioid side effects  - Notify MD/LIP if interventions unsuccessful or patient reports new pain  - Anticipate increased pain with activity and pre-medicate as appropriate  Outcome: Progressing     Problem: Delirium  Goal: Minimize duration of delirium  Description: Interventions:  -  Encourage use of hearing aids, eye glasses  - Promote highest level of mobility daily  - Provide frequent reorientation  - Promote wakefulness i.e. lights on, blinds open  - Promote sleep, encourage patient's normal rest cycle i.e. lights off, TV off, minimize noise and interruptions  - Encourage family to assist in orientation and promotion of home routines  Outcome: Progressing

## 2024-12-17 NOTE — PROGRESS NOTES
[facility]  part of [facility]    Progress Note    Alisha Wilde Patient Status:  Inpatient    10/25/1965 MRN [id number]   Location [facility] 2W/SW Attending Jessika Jimenes MD   Hosp Day # 25 PCP Bridger Avendano MD       Subjective:   Alisha Wilde is a(n) 59 year old female who I am seeing for NAIMA      The patient is resting    Objective:   /70 (BP Location: Left arm)   Pulse 82   Temp 99 °F (37.2 °C) (Temporal)   Resp 18   Ht 5' 5\" (1.651 m)   Wt 286 lb 6 oz (129.9 kg)   SpO2 100%   BMI 47.66 kg/m²      Intake/Output Summary (Last 24 hours) at 2024 1036  Last data filed at 2024 0800  Gross per 24 hour   Intake 3389.52 ml   Output 8200 ml   Net -4810.48 ml     Wt Readings from Last 1 Encounters:   24 286 lb 6 oz (129.9 kg)       Exam  Gen: No acute distress, Heent: NC AT, mucous memb clear, neck supple  Pulm: Lungs clear, normal respiratory effort  CV: Heart with regular rate and rhythm, edema  Abd: Abdomen soft, nontender, nondistended, no organomegaly, bowel sounds present  Skin: no symptoms reported  Psych: alert and oriented    Assessment and Plan:       1 - NAIMA/fluid overload  Creatinine has recovered.  However she is now extremely fluid overloaded.  Continue twice daily diuretics.  She had a vigorous diuretic response to the increased dose yesterday     2 -respiratory failure  Failed multiple SBT's/now has a trach     3 -emergent aortic dissection status post repair  IV Cardene/labetalol     I will sign off, please call if there are any further questions    Results:     Recent Labs   Lab 24  0443 24  0904 12/15/24  0445 24  0508 24  0454   RBC 2.95*   < > 2.98* 3.12* 3.13*   HGB 8.3*   < > 8.0* 8.5* 8.3*   HCT 25.3*   < > 25.2* 26.2* 26.0*   MCV 85.8   < > 84.6 84.0 83.1   MCH 28.1   < > 26.8 27.2 26.5   MCHC 32.8   < > 31.7 32.4 31.9   RDW 16.8*   < > 20.2* 19.5* 19.5*   NEPRELIM 3.64  --   --   --  6.60   WBC 6.5   < > 7.5  9.8 10.8   .0   < > 216.0 222.0 225.0    < > = values in this interval not displayed.         Recent Labs   Lab 12/16/24  0508 12/16/24  1129 12/17/24  0454   * 168* 157*   BUN 24* 24* 22   CREATSERUM 1.14* 1.19* 1.11*   CA 8.7 8.6* 8.7    140 141   K 4.4 4.5 4.0    110 109   CO2 24.0 23.0 24.0          XR CHEST AP PORTABLE  (CPT=71045)    Result Date: 12/16/2024  CONCLUSION: Right basal pulmonary opacity suggesting pneumonia.  No significant interval change since most recent exam from 12/15/24.  Findings are worse since 12/12/24.    Dictated by (CST): Jagjit Jin MD on 12/16/2024 at 10:19 AM     Finalized by (CST): Jagjit Jin MD on 12/16/2024 at 10:20 AM          XR ABDOMEN (1 VIEW) (CPT=74018)    Result Date: 12/16/2024  CONCLUSION: Nonspecific-nonobstructive bowel gas pattern.  Dilated bowel loops demonstrated on preceding exam is not redemonstrated.    Dictated by (CST): Jagjit Jin MD on 12/16/2024 at 10:16 AM     Finalized by (CST): Jagjit Jin MD on 12/16/2024 at 10:18 AM               ZAY LINARES MD  12/17/2024

## 2024-12-17 NOTE — DIETARY NOTE
Dietitian Brief Note     12/17/24: Trickle feeds started yesterday, but pt had episode of emesis overnight/early this AM per RN report today. No documentation from yesterday in flowsheets when trickle feeds were started or stopped, so unsure of total volume given. 300ml output from PEG since LIS was restarted. Will cont TPN for nutrition until EN able to be resumed.       Stefany Argueta RD, LDN  Clinical Dietitian  P: [phone]

## 2024-12-17 NOTE — PLAN OF CARE
Patient attempted to utilize communication board with this writer. Patient was able to express frustration and anxiety through communication board. Sister at bedside was helpful with understanding the patient's needs as patient is trached. When asked if she could write, the patient nodded yes; however, she was unable to hold the marker to write, so communication board was attempted. VS stable, aguilar care provided, oral care provided per protocol, skin care and dressing changes completed. See charting for details.      Problem: Diabetes/Glucose Control  Goal: Glucose maintained within prescribed range  Description: INTERVENTIONS:  - Monitor Blood Glucose as ordered  - Assess for signs and symptoms of hyperglycemia and hypoglycemia  - Administer ordered medications to maintain glucose within target range  - Assess barriers to adequate nutritional intake and initiate nutrition consult as needed  - Instruct patient on self management of diabetes  Outcome: Progressing     Problem: Patient/Family Goals  Goal: Patient/Family Long Term Goal  Description: Patient's Long Term Goal: To discharge from the hospital safely    Interventions:  - surgical interventions, rounding, medications  - See additional Care Plan goals for specific interventions  Outcome: Progressing  Goal: Patient/Family Short Term Goal  Description: Patient's Short Term Goal: to breathe better    Interventions:   - manage the ventilator for pt until she is able to breath on her own.  - See additional Care Plan goals for specific interventions  Outcome: Progressing     Problem: CARDIOVASCULAR - ADULT  Goal: Maintains optimal cardiac output and hemodynamic stability  Description: INTERVENTIONS:  - Monitor vital signs, rhythm, and trends  - Monitor for bleeding, hypotension and signs of decreased cardiac output  - Evaluate effectiveness of vasoactive medications to optimize hemodynamic stability  - Monitor arterial and/or venous puncture sites for bleeding  and/or hematoma  - Assess quality of pulses, skin color and temperature  - Assess for signs of decreased coronary artery perfusion - ex. Angina  - Evaluate fluid balance, assess for edema, trend weights  Outcome: Progressing  Goal: Absence of cardiac arrhythmias or at baseline  Description: INTERVENTIONS:  - Continuous cardiac monitoring, monitor vital signs, obtain 12 lead EKG if indicated  - Evaluate effectiveness of antiarrhythmic and heart rate control medications as ordered  - Initiate emergency measures for life threatening arrhythmias  - Monitor electrolytes and administer replacement therapy as ordered  Outcome: Progressing     Problem: RESPIRATORY - ADULT  Goal: Achieves optimal ventilation and oxygenation  Description: INTERVENTIONS:  - Assess for changes in respiratory status  - Assess for changes in mentation and behavior  - Position to facilitate oxygenation and minimize respiratory effort  - Oxygen supplementation based on oxygen saturation or ABGs  - Provide Smoking Cessation handout, if applicable  - Encourage broncho-pulmonary hygiene including cough, deep breathe, Incentive Spirometry  - Assess the need for suctioning and perform as needed  - Assess and instruct to report SOB or any respiratory difficulty  - Respiratory Therapy support as indicated  - Manage/alleviate anxiety  - Monitor for signs/symptoms of CO2 retention  Outcome: Progressing     Problem: GASTROINTESTINAL - ADULT  Goal: Minimal or absence of nausea and vomiting  Description: INTERVENTIONS:  - Maintain adequate hydration with IV or PO as ordered and tolerated  - Nasogastric tube to low intermittent suction as ordered  - Evaluate effectiveness of ordered antiemetic medications  - Provide nonpharmacologic comfort measures as appropriate  - Advance diet as tolerated, if ordered  - Obtain nutritional consult as needed  - Evaluate fluid balance  Outcome: Progressing  Goal: Maintains or returns to baseline bowel function  Description:  INTERVENTIONS:  - Assess bowel function  - Maintain adequate hydration with IV or PO as ordered and tolerated  - Evaluate effectiveness of GI medications  - Encourage mobilization and activity  - Obtain nutritional consult as needed  - Establish a toileting routine/schedule  - Consider collaborating with pharmacy to review patient's medication profile  Outcome: Progressing     Problem: METABOLIC/FLUID AND ELECTROLYTES - ADULT  Goal: Glucose maintained within prescribed range  Description: INTERVENTIONS:  - Monitor Blood Glucose as ordered  - Assess for signs and symptoms of hyperglycemia and hypoglycemia  - Administer ordered medications to maintain glucose within target range  - Assess barriers to adequate nutritional intake and initiate nutrition consult as needed  - Instruct patient on self management of diabetes  Outcome: Progressing  Goal: Electrolytes maintained within normal limits  Description: INTERVENTIONS:  - Monitor labs and rhythm and assess patient for signs and symptoms of electrolyte imbalances  - Administer electrolyte replacement as ordered  - Monitor response to electrolyte replacements, including rhythm and repeat lab results as appropriate  - Fluid restriction as ordered  - Instruct patient on fluid and nutrition restrictions as appropriate  Outcome: Progressing  Goal: Hemodynamic stability and optimal renal function maintained  Description: INTERVENTIONS:  - Monitor labs and assess for signs and symptoms of volume excess or deficit  - Monitor intake, output and patient weight  - Monitor urine specific gravity, serum osmolarity and serum sodium as indicated or ordered  - Monitor response to interventions for patient's volume status, including labs, urine output, blood pressure (other measures as available)  - Encourage oral intake as appropriate  - Instruct patient on fluid and nutrition restrictions as appropriate  Outcome: Progressing     Problem: SKIN/TISSUE INTEGRITY - ADULT  Goal: Skin  integrity remains intact  Description: INTERVENTIONS  - Assess and document risk factors for pressure ulcer development  - Assess and document skin integrity  - Monitor for areas of redness and/or skin breakdown  - Initiate interventions, skin care algorithm/standards of care as needed  Outcome: Progressing  Goal: Incision(s), wounds(s) or drain site(s) healing without S/S of infection  Description: INTERVENTIONS:  - Assess and document risk factors for pressure ulcer development  - Assess and document skin integrity  - Assess and document dressing/incision, wound bed, drain sites and surrounding tissue  - Implement wound care per orders  - Initiate isolation precautions as appropriate  - Initiate Pressure Ulcer prevention bundle as indicated  Outcome: Progressing  Goal: Oral mucous membranes remain intact  Description: INTERVENTIONS  - Assess oral mucosa and hygiene practices  - Implement preventative oral hygiene regimen  - Implement oral medicated treatments as ordered  Outcome: Progressing     Problem: HEMATOLOGIC - ADULT  Goal: Maintains hematologic stability  Description: INTERVENTIONS  - Assess for signs and symptoms of bleeding or hemorrhage  - Monitor labs and vital signs for trends  - Administer supportive blood products/factors, fluids and medications as ordered and appropriate  - Administer supportive blood products/factors as ordered and appropriate  Outcome: Progressing     Problem: MUSCULOSKELETAL - ADULT  Goal: Return mobility to safest level of function  Description: INTERVENTIONS:  - Assess patient stability and activity tolerance for standing, transferring and ambulating w/ or w/o assistive devices  - Assist with transfers and ambulation using safe patient handling equipment as needed  - Ensure adequate protection for wounds/incisions during mobilization  - Obtain PT/OT consults as needed  - Advance activity as appropriate  - Communicate ordered activity level and limitations with  patient/family  Outcome: Progressing  Goal: Maintain proper alignment of affected body part  Description: INTERVENTIONS:  - Support and protect limb and body alignment per provider's orders  - Instruct and reinforce with patient and family use of appropriate assistive device and precautions (e.g. spinal or hip dislocation precautions)  Outcome: Progressing     Problem: NEUROLOGICAL - ADULT  Goal: Achieves stable or improved neurological status  Description: INTERVENTIONS  - Assess for and report changes in neurological status  - Initiate measures to prevent increased intracranial pressure  - Maintain blood pressure and fluid volume within ordered parameters to optimize cerebral perfusion and minimize risk of hemorrhage  - Monitor temperature, glucose, and sodium. Initiate appropriate interventions as ordered  Outcome: Progressing     Problem: PAIN - ADULT  Goal: Verbalizes/displays adequate comfort level or patient's stated pain goal  Description: INTERVENTIONS:  - Encourage pt to monitor pain and request assistance  - Assess pain using appropriate pain scale  - Administer analgesics based on type and severity of pain and evaluate response  - Implement non-pharmacological measures as appropriate and evaluate response  - Consider cultural and social influences on pain and pain management  - Manage/alleviate anxiety  - Utilize distraction and/or relaxation techniques  - Monitor for opioid side effects  - Notify MD/LIP if interventions unsuccessful or patient reports new pain  - Anticipate increased pain with activity and pre-medicate as appropriate  Outcome: Progressing     Problem: Delirium  Goal: Minimize duration of delirium  Description: Interventions:  - Encourage use of hearing aids, eye glasses  - Promote highest level of mobility daily  - Provide frequent reorientation  - Promote wakefulness i.e. lights on, blinds open  - Promote sleep, encourage patient's normal rest cycle i.e. lights off, TV off, minimize  noise and interruptions  - Encourage family to assist in orientation and promotion of home routines  Outcome: Progressing

## 2024-12-17 NOTE — RESPIRATORY THERAPY NOTE
Pt received with Portex 8 tracheostomy on full vent support. Suction and trach care provided as needed. Current vent settings-        12/17/24 0433   Vent Information   Vent Mode PRVC/AC   Settings   FiO2 (%) 30 %   Resp Rate (Set) 18   Vt (Set, mL) 550 mL   Waveform Decelerating ramp   PEEP/CPAP (cm H2O) 5 cm H20     No changes overnight. RT will continue to monitor.

## 2024-12-17 NOTE — PROGRESS NOTES
Progress Note     Alisha Wilde Patient Status:  Inpatient    10/25/1965 MRN [id number]   Location [facility] 2W/SW Attending Jessika Jimenes MD   Hosp Day # 25 PCP Bridger Avendano MD     Chief Complaint: patient presented with   Chief Complaint   Patient presents with    Chest Pain Angina       Subjective:   Pt seen and examined. No new issues endorsed. Off sedation. BP noted.   Keeps asking \"what happened to me?\"    Review of Systems:   10 point ROS completed and was negative, except for pertinent positive and negatives stated in subjective.    Objective:   Vital signs:  Temp:  [96.9 °F (36.1 °C)-99 °F (37.2 °C)] 99 °F (37.2 °C)  Pulse:  [72-85] 82  Resp:  [16-21] 18  BP: (124-154)/(61-73) 154/70  SpO2:  [97 %-100 %] 100 %  FiO2 (%):  [30 %] 30 %    Wt Readings from Last 6 Encounters:   24 286 lb 6 oz (129.9 kg)   10/24/24 254 lb (115.2 kg)   24 249 lb (112.9 kg)   24 249 lb (112.9 kg)   23 249 lb (112.9 kg)   10/09/23 248 lb (112.5 kg)         Physical Exam:    General: No acute distress. , Tracheostomy in place,     , morbidly obese  Respiratory: Clear to auscultation bilaterally. No wheezes. No rhonchi.  Cardiovascular: S1, S2. Regular rate and rhythm. No murmurs, rubs or gallops.   Abdomen: Soft, nontender, nondistended.  Positive bowel sounds. No rebound or guarding. PEG  Neurologic: No focal neurological deficits.   Musculoskeletal: Moves all extremities.  Extremities: No edema.    Results:   Diagnostic Data:      Labs:    Labs Last 24 Hours:   BMP     CBC    Other     Na 141 Cl 109 BUN 22 Glu 157   Hb 8.3   PTT - Procal -   K 4.0 CO2 24.0 Cr 1.11   WBC 10.8 >< .0  INR - CRP -   Renal Lytes Endo    Hct 26.0   Trop - D dim -   eGFR - Ca 8.7 POC Gluc  157    LFT   pBNP - Lactic -   eGFR AA - PO4 3.5 A1c -   AST - APk - Prot -  LDL -     Mg 1.8 TSH -   ALT - T miranda - Alb -        COVID-19 Lab Results    COVID-19  Lab Results   Component Value Date    COVID19 Not Detected  11/21/2024    COVID19 Not Detected 08/10/2023    COVID19 Not Detected 05/13/2022       Pro-Calcitonin  No results for input(s): \"PCT\" in the last 168 hours.    Cardiac  No results for input(s): \"TROP\", \"PBNP\" in the last 168 hours.    Creatinine Kinase  No results for input(s): \"CK\" in the last 168 hours.    Inflammatory Markers  No results for input(s): \"CRP\", \"NATALIA\", \"LDH\", \"DDIMER\" in the last 168 hours.    Imaging: Imaging data reviewed in Epic.    IMAGES:   CT A/P 12/10/24  CONCLUSION:   1. Fluid-filled loops of small bowel, which could reflect underlying infectious/inflammatory enteritis or malabsorption in the appropriate clinical setting. Bowel loops are mildly dilated without clear transition point to suggest mechanical bowel   obstruction, although early or partial bowel obstruction could have a similar appearance.   2. Extensive distal colonic diverticulosis without CT evidence of acute complication.    3. Bladder distension despite Webb catheter placement.   4. Hepatomegaly with hepatic steatosis.   5. Diffuse anasarca.   6. Partially visualized postthoracotomy chest with possible postoperative seroma/hematoma of the anterior chest wall.   7. Bilateral pleural effusions and associated basilar atelectasis, with or without superimposed pneumonia.   8. Additional scattered patchy nodular opacities may reflect infectious process.    9. Low-density appearance of the intracardiac blood pool raises the possibility of underlying anemia. Correlate with hematologic parameters.   10. Lesser incidental findings as above.      CXR 12/9/24  CONCLUSION:   1. Cardiomegaly.  Atherosclerotic aneurysmal thoracic aorta   2. Tracheostomy tube overlies tracheal air column.   3.  Bilateral mixed alveolar and interstitial multifocal airspace opacification has progressed.        KUB 12/9/24  CONCLUSION:   No huang dilatation of the small bowel to suggest small bowel obstruction.   Large cecal stool burden which may indicate  constipation.  Mild stool burden throughout the remainder of the colon.      CXR 12/5/24  No significant change when compared to 12/04/2024.      CXR 12/4/24  CONCLUSION:   1. Mild cardiomegaly and minimally prominent pulmonary vascularity but no huang pulmonary edema.  ET tube and NG tubes have been removed.  Tracheostomy is now noted in the trachea extending to the level the clavicles.  No pneumothorax.   2. Persistent patchy bilateral upper lobe airspace disease right more than left suggesting persistent bilateral upper lobe pneumonia.  Correlate clinically and continued follow-up is advised.      CXR 12/2/24  FINDINGS:   POSITION: The patient is semi-erect and slightly rotated to the right.   DEVICES: There is an endotracheal tube terminating approximately 3.9 cm above the jennyfer.  A large-bore right internal jugular central venous vascular access sheath has tip projecting in the SVC. An enteric tube extends caudally beyond the field of view.   CARDIAC/VASC: The cardiomediastinal silhouette is accentuated by AP technique, but likely stably enlarged. There is mild tortuosity of the thoracic aorta with peripheral atherosclerotic vascular calcification. The pulmonary vascularity is within normal   limits.   MEDIAST/HAMLET: Surgical clips are present.   LUNGS/PLEURA: Elevation right hemidiaphragm is noted. Multifocal patchy airspace consolidation is demonstrated, slightly worse on the right side than left. Mild interstitial opacities are seen. Additional scattered reticular opacities may be atelectatic   in nature. No large pleural effusion or pneumothorax is evident.    BONES: Median sternotomy wires are demonstrated. Mild degenerative changes of the thoracic spine are apparent. There is no fracture or visible bony lesion.   OTHER: There may be surgical clips at the level of the thoracic inlet. Leads overlie the chest and obscure underlying detail      CXR 11/27/24  Moderate pulmonary edema, progressed since  11/26/2024.      CT c/a/p  CONCLUSION:  Low-lying ETT, 0.8 cm above the jennyfer   Multifocal bilateral pulmonary nodular consolidation s     CXR 11/24/24  FINDINGS:   CARDIAC/VASC: The cardiac silhouette is exaggerated by AP portable technique. There is stable central pulmonary venous congestion.   MEDIAST/HAMLET:   No visible mass or adenopathy.   LUNGS/PLEURA: There are stable streaky opacities at the right lung base.  No pleural effusion or pneumothorax.   BONES: Sternotomy changes are again noted.   OTHER: An endotracheal tube tip projects 3.8 cm above the jennyfer.  An enteric tube again courses into the left upper quadrant.  A right internal jugular approach Hilliards-Dev catheter tip again projects over the pulmonary outflow tract.  A mediastinal drain tip again projects over the right suprahilar region.      CXR 11/23/24  On my read possible RML infiltrates  CONCLUSION: Post emergent acute type A aortic dissection repair.  Stable cardiomegaly.  Gross stable/satisfactory position of lines and tubes.  Mild pulmonary vascular congestion.       CXR 11/22/24  CONCLUSION: Post feeding tube placement with tip in the distal esophagus approximately 4 cm above the gastroesophageal junction.  Recommend advancement of the tube by approximately 10 cm to place it in the stomach.      CXR 11/22/24  CARDIAC/MEDIASTINUM: The cardiac silhouette is enlarged and unchanged.. There is a right internal jugular Hilliards-Dev catheter with tip at the level of the main pulmonary artery. There is a right-sided chest tube. Endotracheal tube with tip approximately    5.3 cm above the jennyfer. There is a nasogastric tube with tip and sidehole within the stomach and below the diaphragm. There are median sternotomy wires and postoperative changes of ascending aortic repair.   LUNGS: There is pulmonary vascular redistribution without edema. Minimal blunting of the bilateral costophrenic angles may be secondary to trace pleural effusions versus chronic  pleural thickening. There is mild bibasilar atelectasis. No pneumothorax.   BONES: There is degenerative disease of the thoracic spine.      Chest CTA 11/22/24  CONCLUSION:   1. Type a aortic dissection beginning just above right renal (correction:  Right coronary)  artery and extending distally into the left common iliac artery and external iliac.  Findings were called to Dr. Echavarria at 5:52 p.m.    KUB 12/15  CONCLUSION:   Significantly limited examination.      Ongoing dilation of small bowel loops up to 4.7 cm, previously 4.5 cm.  Recommend continued radiographic/clinical follow-up.     Medications:    pantoprazole  40 mg Intravenous Daily    furosemide  40 mg Intravenous BID (Diuretic)    LORazepam  0.5 mg Intravenous QID    cloNIDine  1 patch Transdermal Weekly    piperacillin-tazobactam  3.375 g Intravenous Q8H    carvedilol  37.5 mg Oral BID with meals    aspirin  81 mg Peg Tube Daily    isosorbide dinitrate  40 mg Per NG Tube TID (Nitrates)    hydrALAZINE  150 mg Oral Q8H GABRIEL    amLODIPine  10 mg Oral Daily    heparin  5,000 Units Subcutaneous Q8H GABRIEL    chlorhexidine gluconate  15 mL Mouth/Throat BID       Assessment & Plan:   ASSESSMENT / PLAN:     Problem List Items Addressed This Visit          HCC Problems    Dissection of ascending aorta (HCC) - Primary    Relevant Medications    sodium chloride 0.9% infusion 1,000 mL (Completed)    prothrombin complex conc (human) (Kcentra) 1,563 Units in 60 mL infusion (Completed)    furosemide (Lasix) 10 mg/mL injection 20 mg (Completed)    heparin (Porcine) 5000 UNIT/ML injection 5,000 Units    amLODIPine (Norvasc) tab 10 mg    amLODIPine (Norvasc) tab 5 mg (Completed)    sodium chloride 0.9% infusion (Completed)    hydrALAZINE (Apresoline) tab 50 mg (Completed)    furosemide (Lasix) 10 mg/mL injection 40 mg (Completed)    labetalol (Trandate) 5 mg/mL injection (Completed)    hydrALAZINE (Apresoline) tab 150 mg    isosorbide dinitrate (Isordil) tab 40 mg    sodium  chloride 0.9% infusion (Completed)    furosemide (Lasix) 10 mg/mL injection 40 mg (Completed)    niCARdipine (carDENE) 125 mg in sodium chloride 0.9% 250 mL infusion    furosemide (Lasix) 10 mg/mL injection 40 mg (Completed)    cloNIDine (Catapres) tab 0.3 mg (Completed)    cloNIDine (Catapres) tab 0.3 mg (Completed)    aspirin chewable tab 81 mg    carvedilol (Coreg) tab 37.5 mg    hydrALAzine (Apresoline) 20 mg/mL injection 10 mg    dexmedeTOMIDine (Precedex) 800 mcg in sodium chloride 0.9% 100 mL infusion    furosemide (Lasix) 10 mg/mL injection 40 mg (Completed)    cloNIDine (Catapres) 0.3 MG/24HR patch 1 patch    furosemide (Lasix) 10 mg/mL injection 40 mg (Completed)    furosemide (Lasix) 10 mg/mL injection 40 mg (Completed)    labetalol (Trandate) 400 mg in sodium chloride 0.9% 200 mL infusion    sodium chloride 0.9% infusion (Completed)    pantoprazole (Protonix) 40 mg in sodium chloride 0.9% PF 10 mL IV push    furosemide (Lasix) 10 mg/mL injection 40 mg     Other Visit Diagnoses       Aortic anomaly (HCC)        Relevant Medications    atropine 0.1 MG/ML injection 1 mg (Completed)    magnesium sulfate in dextrose 5% 1 g/100mL infusion premix 1 g    magnesium sulfate in sterile water for injection 2 g/50mL IVPB premix 2 g    furosemide (Lasix) 10 mg/mL injection 20 mg (Completed)    heparin (Porcine) 5000 UNIT/ML injection 5,000 Units    amLODIPine (Norvasc) tab 10 mg    amLODIPine (Norvasc) tab 5 mg (Completed)    hydrALAZINE (Apresoline) tab 50 mg (Completed)    furosemide (Lasix) 10 mg/mL injection 40 mg (Completed)    labetalol (Trandate) 5 mg/mL injection (Completed)    hydrALAZINE (Apresoline) tab 150 mg    lidocaine PF (Xylocaine-MPF) 1% injection (Completed)    isosorbide dinitrate (Isordil) tab 40 mg    furosemide (Lasix) 10 mg/mL injection 40 mg (Completed)    niCARdipine (carDENE) 125 mg in sodium chloride 0.9% 250 mL infusion    ceFAZolin (Ancef) 3 g in sodium chloride 0.9% 100mL IVPB premix  (Completed)    furosemide (Lasix) 10 mg/mL injection 40 mg (Completed)    cloNIDine (Catapres) tab 0.3 mg (Completed)    cloNIDine (Catapres) tab 0.3 mg (Completed)    aspirin chewable tab 81 mg    carvedilol (Coreg) tab 37.5 mg    hydrALAzine (Apresoline) 20 mg/mL injection 10 mg    furosemide (Lasix) 10 mg/mL injection 40 mg (Completed)    lidocaine (cardiac) (Xylocaine) 20 mg/mL injection (Completed)    atropine 0.1 MG/ML injection (Completed)    EPINEPHrine (Adrenalin) 1 MG/10ML (0.1 MG/ML) injection (Cardiac Arrest) (Completed)    cloNIDine (Catapres) 0.3 MG/24HR patch 1 patch    furosemide (Lasix) 10 mg/mL injection 40 mg (Completed)    furosemide (Lasix) 10 mg/mL injection 40 mg (Completed)    labetalol (Trandate) 400 mg in sodium chloride 0.9% 200 mL infusion    furosemide (Lasix) 10 mg/mL injection 40 mg    adult 3 in 1 TPN    magnesium sulfate in sterile water for injection 2 g/50mL IVPB premix 2 g (Completed)    Other Relevant Orders    Specimen to Pathology Tissue (Completed)            60 y/o Morbid obesity, hypertension, gastroesophageal reflux disease, chronic kidney disease stage 2 to 3, and hyperlipidemia admitted on 11/21 for Chest pain -CT CT angiogram of the chest, abdomen, and pelvis rule out dissection showed type A aortic dissection s/p emergent repair 11/22/24 , transferred to ICU post op intubated on 11/22, failed SBT, s/p trach 12/4,  PEG 12/5, completed 10 days of abx zsecondary to aspiration event , no with ileus and Abx Vanc and Zosyn resumed for aspiration pneumonia           Ileus   -Gen surgery and GI following   -rpt Abd xray results pending   -PEG tube to LIS   -possibly will need NG   -Continue IV abx   12/13-PEG tube drainage improved   12/14: Repeat KUB this am   12/15: KUB yesterday with on going  Small bowel Dilation         Type A aortic dissection   Ileus   S/p emergent repair 11/22/24   CT chest abdomen and pelvis shows bilateral pulmonary nodular consolidation- no focal  fluid collection- completed IV antibiotics- continue to monitor off of them   On IV Reglan- montior QT interval   Antihypertensives being slowly added now that PEG is in place-tube feeds initially held due to secretions and bowel burden-on TPN for now  Continue bowel regimen- miralax bid, tap water enemas- GI on board   Will order another CT scan to see if there is possible obstruction  Will most likely need to place NG tube- discussed with surgery and nursing   Add scopolamine patch   New picc 12/2  CV surgery, cards and critical care on consult.   TTE LVEF 75-80%.   Cont BP control per Cards  -s/p pRBC transfusion on 12/4 with repeat hg stable          - Plan is to eventually discharge to LTAC  PEG tube to low intermittent suction per GI- TFs stopped   Weaning antihypertensives    Acute hypoxic respiratory failure   Aspiration event   Completed 10 days of zosyn.   Sputum cx candida   Failed SBT multiple times. Plans for trach tomorrow. PEG 12/5   ENT and GI on consult.  Pulmonary on consult.   CXR with multifocal airspace dz  Albuterol nebs   Status post trach placement 12/4  Status post PEG placement  12/5- resume tube feeds   Seroquel started to help with agitation 50 mg BID  12/12:  off propofol, on precedex. Psych consulted   12/13 weaning precedex, psych recommends Haldol and ativan PRN   12/17-off precedex      H/o tobacco and ETOH abuse   Duonebs PRN   CIWA protocol    NAIMA on CKD III   Volume overload  Renal on consult.   [place] to be secondary to MARY LOU/ATN   Off lasix drip now on IV BID lasix. Stable  Doppler renal ultrasound unremarkable for renal artery stenosis    Essential HTN   Coreg 25 mg BID, norvasc 10mg daily, hydralazine 150mg TID. Clonidinie patch 0.2mg TID   Add hydralazine combination with isosobide 150-40 mg TID   On lasix  Webb in place   ARB on hold due to NAIMA.   Weaning labetalol and cardene  Iron def anemia  IV iron.   Iron level low   Transfuse for Hb < 7.0   Other medical problems  Morbid  Obesity   TANYA       Quality:  DVT Prophylaxis: Heparin   CODE status: FULL   DISPO: pending clinical improvement.   Estimated date of discharge: To be determined  Discharge is dependent on: Improved clinical status  At this point Patient is expected to be discharge to: LTAC    .     Coordinated care with providers and counseling re: treatment plan and workup    Jessika Jimenes MD    Supplementary Documentation:      MDM: High, acute illness/severe exacerbation of chronic illness posing threat to life.  IV medications requiring close inpatient monitoring

## 2024-12-18 ENCOUNTER — APPOINTMENT (OUTPATIENT)
Dept: GENERAL RADIOLOGY | Facility: HOSPITAL | Age: 59
DRG: 003 | End: 2024-12-18
Attending: INTERNAL MEDICINE
Payer: COMMERCIAL

## 2024-12-18 ENCOUNTER — APPOINTMENT (OUTPATIENT)
Dept: GENERAL RADIOLOGY | Facility: HOSPITAL | Age: 59
End: 2024-12-18
Attending: INTERNAL MEDICINE
Payer: COMMERCIAL

## 2024-12-18 LAB
ANION GAP SERPL CALC-SCNC: 9 MMOL/L (ref 0–18)
BASOPHILS # BLD: 0 X10(3) UL (ref 0–0.2)
BASOPHILS NFR BLD: 0 %
BUN BLD-MCNC: 20 MG/DL (ref 9–23)
BUN/CREAT SERPL: 17.9 (ref 10–20)
CALCIUM BLD-MCNC: 9 MG/DL (ref 8.7–10.4)
CHLORIDE SERPL-SCNC: 105 MMOL/L (ref 98–112)
CO2 SERPL-SCNC: 26 MMOL/L (ref 21–32)
CREAT BLD-MCNC: 1.12 MG/DL
DEPRECATED RDW RBC AUTO: 56.5 FL (ref 35.1–46.3)
EGFRCR SERPLBLD CKD-EPI 2021: 56 ML/MIN/1.73M2 (ref 60–?)
EOSINOPHIL # BLD: 0.42 X10(3) UL (ref 0–0.7)
EOSINOPHIL NFR BLD: 4 %
ERYTHROCYTE [DISTWIDTH] IN BLOOD BY AUTOMATED COUNT: 19.2 % (ref 11–15)
GLUCOSE BLD-MCNC: 162 MG/DL (ref 70–99)
GLUCOSE BLDC GLUCOMTR-MCNC: 152 MG/DL (ref 70–99)
GLUCOSE BLDC GLUCOMTR-MCNC: 153 MG/DL (ref 70–99)
GLUCOSE BLDC GLUCOMTR-MCNC: 170 MG/DL (ref 70–99)
HCT VFR BLD AUTO: 25.5 %
HGB BLD-MCNC: 8.1 G/DL
LYMPHOCYTES NFR BLD: 1.26 X10(3) UL (ref 1–4)
LYMPHOCYTES NFR BLD: 12 %
MAGNESIUM SERPL-MCNC: 2.1 MG/DL (ref 1.6–2.6)
MCH RBC QN AUTO: 26.1 PG (ref 26–34)
MCHC RBC AUTO-ENTMCNC: 31.8 G/DL (ref 31–37)
MCV RBC AUTO: 82.3 FL
METAMYELOCYTES # BLD: 0.11 X10(3) UL
METAMYELOCYTES NFR BLD: 1 %
MONOCYTES # BLD: 0.84 X10(3) UL (ref 0.1–1)
MONOCYTES NFR BLD: 8 %
MYELOCYTES # BLD: 0.11 X10(3) UL
MYELOCYTES NFR BLD: 1 %
NEUTROPHILS # BLD AUTO: 6.96 X10 (3) UL (ref 1.5–7.7)
NEUTROPHILS NFR BLD: 69 %
NEUTS BAND NFR BLD: 5 %
NEUTS HYPERSEG # BLD: 7.77 X10(3) UL (ref 1.5–7.7)
OSMOLALITY SERPL CALC.SUM OF ELEC: 296 MOSM/KG (ref 275–295)
PHOSPHATE SERPL-MCNC: 3.7 MG/DL (ref 2.4–5.1)
PLATELET # BLD AUTO: 207 10(3)UL (ref 150–450)
PLATELET MORPHOLOGY: NORMAL
POTASSIUM SERPL-SCNC: 4 MMOL/L (ref 3.5–5.1)
RBC # BLD AUTO: 3.1 X10(6)UL
SODIUM SERPL-SCNC: 140 MMOL/L (ref 136–145)
TOTAL CELLS COUNTED BLD: 100
WBC # BLD AUTO: 10.5 X10(3) UL (ref 4–11)

## 2024-12-18 PROCEDURE — 74018 RADEX ABDOMEN 1 VIEW: CPT | Performed by: INTERNAL MEDICINE

## 2024-12-18 PROCEDURE — 99233 SBSQ HOSP IP/OBS HIGH 50: CPT | Performed by: INTERNAL MEDICINE

## 2024-12-18 PROCEDURE — 99233 SBSQ HOSP IP/OBS HIGH 50: CPT | Performed by: OTHER

## 2024-12-18 RX ORDER — ESCITALOPRAM OXALATE 5 MG/1
5 TABLET ORAL NIGHTLY
Status: DISCONTINUED | OUTPATIENT
Start: 2024-12-18 | End: 2024-12-19

## 2024-12-18 NOTE — PROGRESS NOTES
[facility]  part of [facility]    Progress Note    Alisha Wilde Patient Status:  Inpatient    10/25/1965 MRN [id number]   Location [facility] 2W/SW Attending Jessika Jimenes MD   Hosp Day # 26 PCP Bridger Avendano MD     Subjective:  Pt awake/alert/calm, voiced this am \"I want to die.\" She is currently on full vent support w/Trach in place. She continues to slowly improve daily and gain strength. She denies pain and SOB. Able to move all ext & follow simple commands.   No visitors at bedside.     Objective:  /64 (BP Location: Left arm)   Pulse 78   Temp 98.6 °F (37 °C) (Temporal)   Resp 18   Ht 5' 5\" (1.651 m)   Wt 286 lb 6 oz (129.9 kg)   SpO2 100%   BMI 47.66 kg/m²     Temp (24hrs), Av.6 °F (37 °C), Min:97.9 °F (36.6 °C), Max:99.1 °F (37.3 °C)      Intake/Output:    Intake/Output Summary (Last 24 hours) at 2024 1157  Last data filed at 2024 0600  Gross per 24 hour   Intake 5478 ml   Output 7150 ml   Net -1672 ml       Wt Readings from Last 3 Encounters:   24 286 lb 6 oz (129.9 kg)   10/24/24 254 lb (115.2 kg)   24 249 lb (112.9 kg)       Allergies:  Allergies[1]    Labs:  Lab Results   Component Value Date    WBC 10.5 2024    HGB 8.1 2024    HCT 25.5 2024    .0 2024    CREATSERUM 1.12 2024    BUN 20 2024     2024    K 4.0 2024     2024    CO2 26.0 2024     2024    CA 9.0 2024    MG 2.1 2024    PHOS 3.7 2024       Physical Exam:  Blood pressure 125/64, pulse 78, temperature 98.6 °F (37 °C), temperature source Temporal, resp. rate 18, height 5' 5\" (1.651 m), weight 286 lb 6 oz (129.9 kg), SpO2 100%, not currently breastfeeding.  General: NAD  Neck: No JVD  Lungs: Coarse bilaterally  Heart: RRR, S1, S2  Abdomen: Softer/Obese, NT/ND, BS+x4/+BM (loose today)  Extremities: Warm, dry, 1+ generalized UE & LE edema bilat  Pulses: 2+ bilat  DP  Skin: sternotomy incision CATHIE=CDI-healing well. Left femoral area incision intact w/mild superficial skin excoriation.   Neurological: awake/calm/cooperative/nods head to simple questions/moves all ext/follow simple commands. Mouths words/able to talk over vent      Assessment/Plan:  S/P EMERGENT AORTIC DISSECTION REPAIR POD # 26  Respiratory Failure w/multiple failed SBT prior to trach placement -currently on full vent support-continue daily weaning trials w/mgt per Pulm. Re-intubated on 11/22 per Pulm after emesis episode/ETT removal due to emesis content noted in airway.    S/P Trach placement per ENT on 12/4 w/Trach changed 12/10   Completed ABX course for suspected aspiration pneumonia post op.  ABX re-started on 12/9 after emesis episode on 12/9: concern for aspiration -12/19 complete.  +BM (s) post op however pt w/episode of emesis on 12/9 w/stool content noted/+high PEG residuals. TF continue on hold-trickle feeding okay per GI.  Pt w/intermittent episodes of emesis. Mgt per GI. + loose stools today. Pt denies abdominal tenderness.   Hx: HTN: on multiple antihypertensives including IV Cardene & IV Labetolol-weaning as able:SBP goal= <130/MAP >70. Mgt per Cards. Can wean once able to take meds through PEG.   New PICC placed 12/2  Pain meds as needed-avoid/limit narcotics w/recent constipation/emesis episodes/dilated bowel loops post op  Scds/Heparin SubQ prophylaxis DVT prevention. HIPA 11/25=negative. Plts continue >100,000  Continue ICU status  Hx: CKD. Expected post op volume overload w/mgt per Nephrology (consulted on 11/23) for NAIMA: CR continues to improve. Limit/avoid nephrotoxic meds: Lasix per Nephrology. Aguilar remains for close I/O monitoring/immobility-new aguilar placed 12/9 then again 12/10 due to obstruction. Kidney US w/doppler on 12/8.   Expected post op anemia: w/o clinical significance/stable. May be slightly diluted due to volume overload.   Hx: Morbid obesity w/BMI >40-plan for RD to see  when appropriate  Nutrition per TPN. PEG placed 12/5 per GI.  PEG to LIS. Mgt per GI -start trickle feeds  Hx: ETOH abuse-monitor for withdrawals-none noted.    Psych consulted 12/12 to assist w/med mgt-follow recs. Reconsult today - pt stating she wanted to die.  Discharge planning: pt lives alone and has supportive family.   Anticipate LTAC at discharge: referrals sent proactively by  on 11/29 for LTAC.  Anticipate ready for LTAC next few days once GI concerns resolve and weaned off IV Cardene & Labetalol.     Plan of care discussed w/patient/RN/Dr. Marysol Randle APN  12/18/24  1200         [1]   Allergies  Allergen Reactions    Atorvastatin MYALGIA    Pravastatin MYALGIA    Rosuvastatin MYALGIA    Seasonal Runny nose

## 2024-12-18 NOTE — PROGRESS NOTES
Progress Note     Alisha Wilde Patient Status:  Inpatient    10/25/1965 MRN [id number]   Location [facility] 2W/SW Attending Jessika Jimenes MD   Hosp Day # 26 PCP Bridger Avendano MD     Chief Complaint: patient presented with   Chief Complaint   Patient presents with    Chest Pain Angina       Subjective:   Pt seen and examined. No new issues endorsed. + BM today    Review of Systems:   10 point ROS completed and was negative, except for pertinent positive and negatives stated in subjective.    Objective:   Vital signs:  Temp:  [98.3 °F (36.8 °C)-98.9 °F (37.2 °C)] 98.6 °F (37 °C)  Pulse:  [73-89] 81  Resp:  [16-23] 18  BP: ()/() 121/82  SpO2:  [99 %-100 %] 99 %  FiO2 (%):  [30 %] 30 %    Wt Readings from Last 6 Encounters:   24 286 lb 6 oz (129.9 kg)   10/24/24 254 lb (115.2 kg)   24 249 lb (112.9 kg)   24 249 lb (112.9 kg)   23 249 lb (112.9 kg)   10/09/23 248 lb (112.5 kg)         Physical Exam:    General: No acute distress. , Tracheostomy in place,     , morbidly obese  Respiratory: Clear to auscultation bilaterally. No wheezes. No rhonchi.  Cardiovascular: S1, S2. Regular rate and rhythm. No murmurs, rubs or gallops.   Abdomen: Soft, nontender, nondistended.  Positive bowel sounds. No rebound or guarding. PEG  Neurologic: No focal neurological deficits.   Musculoskeletal: Moves all extremities.  Extremities: No edema.    Results:   Diagnostic Data:      Labs:    Labs Last 24 Hours:   BMP     CBC    Other     Na 140 Cl 105 BUN 20 Glu 162   Hb 8.1   PTT - Procal -   K 4.0 CO2 26.0 Cr 1.12   WBC 10.5 >< .0  INR - CRP -   Renal Lytes Endo    Hct 25.5   Trop - D dim -   eGFR - Ca 9.0 POC Gluc  170    LFT   pBNP - Lactic -   eGFR AA - PO4 3.7 A1c -   AST - APk - Prot -  LDL -     Mg 2.1 TSH -   ALT - T miranda - Alb -        COVID-19 Lab Results    COVID-19  Lab Results   Component Value Date    COVID19 Not Detected 2024    COVID19 Not Detected 08/10/2023     COVID19 Not Detected 05/13/2022       Pro-Calcitonin  No results for input(s): \"PCT\" in the last 168 hours.    Cardiac  No results for input(s): \"TROP\", \"PBNP\" in the last 168 hours.    Creatinine Kinase  No results for input(s): \"CK\" in the last 168 hours.    Inflammatory Markers  No results for input(s): \"CRP\", \"NATALIA\", \"LDH\", \"DDIMER\" in the last 168 hours.    Imaging: Imaging data reviewed in Epic.    IMAGES:   CT A/P 12/10/24  CONCLUSION:   1. Fluid-filled loops of small bowel, which could reflect underlying infectious/inflammatory enteritis or malabsorption in the appropriate clinical setting. Bowel loops are mildly dilated without clear transition point to suggest mechanical bowel   obstruction, although early or partial bowel obstruction could have a similar appearance.   2. Extensive distal colonic diverticulosis without CT evidence of acute complication.    3. Bladder distension despite Webb catheter placement.   4. Hepatomegaly with hepatic steatosis.   5. Diffuse anasarca.   6. Partially visualized postthoracotomy chest with possible postoperative seroma/hematoma of the anterior chest wall.   7. Bilateral pleural effusions and associated basilar atelectasis, with or without superimposed pneumonia.   8. Additional scattered patchy nodular opacities may reflect infectious process.    9. Low-density appearance of the intracardiac blood pool raises the possibility of underlying anemia. Correlate with hematologic parameters.   10. Lesser incidental findings as above.      CXR 12/9/24  CONCLUSION:   1. Cardiomegaly.  Atherosclerotic aneurysmal thoracic aorta   2. Tracheostomy tube overlies tracheal air column.   3.  Bilateral mixed alveolar and interstitial multifocal airspace opacification has progressed.        KUB 12/9/24  CONCLUSION:   No huang dilatation of the small bowel to suggest small bowel obstruction.   Large cecal stool burden which may indicate constipation.  Mild stool burden throughout the  remainder of the colon.      CXR 12/5/24  No significant change when compared to 12/04/2024.      CXR 12/4/24  CONCLUSION:   1. Mild cardiomegaly and minimally prominent pulmonary vascularity but no huang pulmonary edema.  ET tube and NG tubes have been removed.  Tracheostomy is now noted in the trachea extending to the level the clavicles.  No pneumothorax.   2. Persistent patchy bilateral upper lobe airspace disease right more than left suggesting persistent bilateral upper lobe pneumonia.  Correlate clinically and continued follow-up is advised.      CXR 12/2/24  FINDINGS:   POSITION: The patient is semi-erect and slightly rotated to the right.   DEVICES: There is an endotracheal tube terminating approximately 3.9 cm above the jennyfer.  A large-bore right internal jugular central venous vascular access sheath has tip projecting in the SVC. An enteric tube extends caudally beyond the field of view.   CARDIAC/VASC: The cardiomediastinal silhouette is accentuated by AP technique, but likely stably enlarged. There is mild tortuosity of the thoracic aorta with peripheral atherosclerotic vascular calcification. The pulmonary vascularity is within normal   limits.   MEDIAST/HAMLET: Surgical clips are present.   LUNGS/PLEURA: Elevation right hemidiaphragm is noted. Multifocal patchy airspace consolidation is demonstrated, slightly worse on the right side than left. Mild interstitial opacities are seen. Additional scattered reticular opacities may be atelectatic   in nature. No large pleural effusion or pneumothorax is evident.    BONES: Median sternotomy wires are demonstrated. Mild degenerative changes of the thoracic spine are apparent. There is no fracture or visible bony lesion.   OTHER: There may be surgical clips at the level of the thoracic inlet. Leads overlie the chest and obscure underlying detail      CXR 11/27/24  Moderate pulmonary edema, progressed since 11/26/2024.      CT c/a/p  CONCLUSION:  Low-lying ETT,  0.8 cm above the jennyfer   Multifocal bilateral pulmonary nodular consolidation s     CXR 11/24/24  FINDINGS:   CARDIAC/VASC: The cardiac silhouette is exaggerated by AP portable technique. There is stable central pulmonary venous congestion.   MEDIAST/HAMLET:   No visible mass or adenopathy.   LUNGS/PLEURA: There are stable streaky opacities at the right lung base.  No pleural effusion or pneumothorax.   BONES: Sternotomy changes are again noted.   OTHER: An endotracheal tube tip projects 3.8 cm above the jennyfer.  An enteric tube again courses into the left upper quadrant.  A right internal jugular approach Lancaster-Dev catheter tip again projects over the pulmonary outflow tract.  A mediastinal drain tip again projects over the right suprahilar region.      CXR 11/23/24  On my read possible RML infiltrates  CONCLUSION: Post emergent acute type A aortic dissection repair.  Stable cardiomegaly.  Gross stable/satisfactory position of lines and tubes.  Mild pulmonary vascular congestion.       CXR 11/22/24  CONCLUSION: Post feeding tube placement with tip in the distal esophagus approximately 4 cm above the gastroesophageal junction.  Recommend advancement of the tube by approximately 10 cm to place it in the stomach.      CXR 11/22/24  CARDIAC/MEDIASTINUM: The cardiac silhouette is enlarged and unchanged.. There is a right internal jugular Lancaster-Dev catheter with tip at the level of the main pulmonary artery. There is a right-sided chest tube. Endotracheal tube with tip approximately    5.3 cm above the jennyfer. There is a nasogastric tube with tip and sidehole within the stomach and below the diaphragm. There are median sternotomy wires and postoperative changes of ascending aortic repair.   LUNGS: There is pulmonary vascular redistribution without edema. Minimal blunting of the bilateral costophrenic angles may be secondary to trace pleural effusions versus chronic pleural thickening. There is mild bibasilar atelectasis.  No pneumothorax.   BONES: There is degenerative disease of the thoracic spine.      Chest CTA 11/22/24  CONCLUSION:   1. Type a aortic dissection beginning just above right renal (correction:  Right coronary)  artery and extending distally into the left common iliac artery and external iliac.  Findings were called to Dr. Echavarria at 5:52 p.m.    KUB 12/15  CONCLUSION:   Significantly limited examination.      Ongoing dilation of small bowel loops up to 4.7 cm, previously 4.5 cm.  Recommend continued radiographic/clinical follow-up.     Medications:    metoclopramide  10 mg Intravenous Q6H    pantoprazole  40 mg Intravenous Daily    furosemide  40 mg Intravenous BID (Diuretic)    LORazepam  0.5 mg Intravenous QID    cloNIDine  1 patch Transdermal Weekly    piperacillin-tazobactam  3.375 g Intravenous Q8H    carvedilol  37.5 mg Oral BID with meals    aspirin  81 mg Peg Tube Daily    isosorbide dinitrate  40 mg Per NG Tube TID (Nitrates)    hydrALAZINE  150 mg Oral Q8H GABRIEL    amLODIPine  10 mg Oral Daily    heparin  5,000 Units Subcutaneous Q8H GABRIEL    chlorhexidine gluconate  15 mL Mouth/Throat BID       Assessment & Plan:   ASSESSMENT / PLAN:     Problem List Items Addressed This Visit          HCC Problems    Dissection of ascending aorta (HCC) - Primary    Relevant Medications    sodium chloride 0.9% infusion 1,000 mL (Completed)    prothrombin complex conc (human) (Kcentra) 1,563 Units in 60 mL infusion (Completed)    furosemide (Lasix) 10 mg/mL injection 20 mg (Completed)    heparin (Porcine) 5000 UNIT/ML injection 5,000 Units    amLODIPine (Norvasc) tab 10 mg    amLODIPine (Norvasc) tab 5 mg (Completed)    sodium chloride 0.9% infusion (Completed)    hydrALAZINE (Apresoline) tab 50 mg (Completed)    furosemide (Lasix) 10 mg/mL injection 40 mg (Completed)    labetalol (Trandate) 5 mg/mL injection (Completed)    hydrALAZINE (Apresoline) tab 150 mg    isosorbide dinitrate (Isordil) tab 40 mg    sodium chloride 0.9%  infusion (Completed)    furosemide (Lasix) 10 mg/mL injection 40 mg (Completed)    niCARdipine (carDENE) 125 mg in sodium chloride 0.9% 250 mL infusion    furosemide (Lasix) 10 mg/mL injection 40 mg (Completed)    cloNIDine (Catapres) tab 0.3 mg (Completed)    cloNIDine (Catapres) tab 0.3 mg (Completed)    aspirin chewable tab 81 mg    carvedilol (Coreg) tab 37.5 mg    hydrALAzine (Apresoline) 20 mg/mL injection 10 mg    dexmedeTOMIDine (Precedex) 800 mcg in sodium chloride 0.9% 100 mL infusion    furosemide (Lasix) 10 mg/mL injection 40 mg (Completed)    cloNIDine (Catapres) 0.3 MG/24HR patch 1 patch    furosemide (Lasix) 10 mg/mL injection 40 mg (Completed)    furosemide (Lasix) 10 mg/mL injection 40 mg (Completed)    labetalol (Trandate) 400 mg in sodium chloride 0.9% 200 mL infusion    sodium chloride 0.9% infusion (Completed)    pantoprazole (Protonix) 40 mg in sodium chloride 0.9% PF 10 mL IV push    furosemide (Lasix) 10 mg/mL injection 40 mg     Other Visit Diagnoses       Aortic anomaly (HCC)        Relevant Medications    atropine 0.1 MG/ML injection 1 mg (Completed)    magnesium sulfate in dextrose 5% 1 g/100mL infusion premix 1 g    magnesium sulfate in sterile water for injection 2 g/50mL IVPB premix 2 g    furosemide (Lasix) 10 mg/mL injection 20 mg (Completed)    heparin (Porcine) 5000 UNIT/ML injection 5,000 Units    amLODIPine (Norvasc) tab 10 mg    amLODIPine (Norvasc) tab 5 mg (Completed)    hydrALAZINE (Apresoline) tab 50 mg (Completed)    furosemide (Lasix) 10 mg/mL injection 40 mg (Completed)    labetalol (Trandate) 5 mg/mL injection (Completed)    hydrALAZINE (Apresoline) tab 150 mg    lidocaine PF (Xylocaine-MPF) 1% injection (Completed)    isosorbide dinitrate (Isordil) tab 40 mg    furosemide (Lasix) 10 mg/mL injection 40 mg (Completed)    niCARdipine (carDENE) 125 mg in sodium chloride 0.9% 250 mL infusion    ceFAZolin (Ancef) 3 g in sodium chloride 0.9% 100mL IVPB premix (Completed)     furosemide (Lasix) 10 mg/mL injection 40 mg (Completed)    cloNIDine (Catapres) tab 0.3 mg (Completed)    cloNIDine (Catapres) tab 0.3 mg (Completed)    aspirin chewable tab 81 mg    carvedilol (Coreg) tab 37.5 mg    hydrALAzine (Apresoline) 20 mg/mL injection 10 mg    furosemide (Lasix) 10 mg/mL injection 40 mg (Completed)    lidocaine (cardiac) (Xylocaine) 20 mg/mL injection (Completed)    atropine 0.1 MG/ML injection (Completed)    EPINEPHrine (Adrenalin) 1 MG/10ML (0.1 MG/ML) injection (Cardiac Arrest) (Completed)    cloNIDine (Catapres) 0.3 MG/24HR patch 1 patch    furosemide (Lasix) 10 mg/mL injection 40 mg (Completed)    furosemide (Lasix) 10 mg/mL injection 40 mg (Completed)    labetalol (Trandate) 400 mg in sodium chloride 0.9% 200 mL infusion    furosemide (Lasix) 10 mg/mL injection 40 mg    magnesium sulfate in sterile water for injection 2 g/50mL IVPB premix 2 g (Completed)    adult 3 in 1 TPN    adult 3 in 1 TPN (Start on 12/18/2024  9:00 PM)    Other Relevant Orders    Specimen to Pathology Tissue (Completed)            58 y/o Morbid obesity, hypertension, gastroesophageal reflux disease, chronic kidney disease stage 2 to 3, and hyperlipidemia admitted on 11/21 for Chest pain -CT CT angiogram of the chest, abdomen, and pelvis rule out dissection showed type A aortic dissection s/p emergent repair 11/22/24 , transferred to ICU post op intubated on 11/22, failed SBT, s/p trach 12/4,  PEG 12/5, completed 10 days of abx zsecondary to aspiration event , no with ileus and Abx Vanc and Zosyn resumed for aspiration pneumonia           Ileus   -Gen surgery and GI following   -rpt Abd xray results pending   -PEG tube to LIS   -possibly will need NG   -Continue IV abx   12/13-PEG tube drainage improved   12/14: Repeat KUB this am   12/15: KUB yesterday with on going  Small bowel Dilation         Type A aortic dissection   Ileus   S/p emergent repair 11/22/24   CT chest abdomen and pelvis shows bilateral pulmonary  nodular consolidation- no focal fluid collection- completed IV antibiotics- continue to monitor off of them   On IV Reglan- montior QT interval   Antihypertensives being slowly added now that PEG is in place-tube feeds initially held due to secretions and bowel burden-on TPN for now  Continue bowel regimen- miralax bid, tap water enemas- GI on board   Will order another CT scan to see if there is possible obstruction  New picc 12/2  CV surgery, cards and critical care on consult.   TTE LVEF 75-80%.   Cont BP control per Cards  -s/p pRBC transfusion on 12/4 with repeat hg stable        - Plan is to eventually discharge to LTAC  PEG 12/5-  ok for meds, starting trickle feeds, keep TPN for now  Weaning antihypertensives, kub improved    Acute hypoxic respiratory failure   Aspiration event   Completed 10 days of zosyn.   Sputum cx candida   Failed SBT multiple times. Plans for trach tomorrow. PEG 12/5   ENT and GI on consult.  Pulmonary on consult.   CXR with multifocal airspace dz  Albuterol nebs   Status post trach placement 12/4  Status post PEG placement  12/5- resume tube feeds   Seroquel started to help with agitation 50 mg BID  12/12:  off propofol, on precedex. Psych consulted   12/13 weaning precedex, psych recommends Haldol and ativan PRN   12/17-off precedex      H/o tobacco and ETOH abuse   Duonebs PRN   CIWA protocol    NAIMA on CKD III   Volume overload  Renal on consult.   [place] to be secondary to MARY LOU/ATN   Off lasix drip now on IV BID lasix. Stable  Doppler renal ultrasound unremarkable for renal artery stenosis    Essential HTN   Coreg 25 mg BID, norvasc 10mg daily, hydralazine 150mg TID. Clonidinie patch 0.2mg TID   Add hydralazine combination with isosobide 150-40 mg TID   On lasix  Webb in place   ARB on hold due to NAIMA.   Weaning labetalol and cardene  Iron def anemia  IV iron.   Iron level low   Transfuse for Hb < 7.0   Other medical problems  Morbid Obesity   TANYA       Quality:  DVT Prophylaxis:  Heparin   CODE status: FULL   DISPO: pending clinical improvement.   Estimated date of discharge: To be determined  Discharge is dependent on: Improved clinical status  At this point Patient is expected to be discharge to: LTAC    .     Coordinated care with providers and counseling re: treatment plan and workup  Will consult PT as pt appears motivated to get up and move    Jessika Jimenes MD    Supplementary Documentation:      MDM: High, acute illness/severe exacerbation of chronic illness posing threat to life.  IV medications requiring close inpatient monitoring

## 2024-12-18 NOTE — RESPIRATORY THERAPY NOTE
Pt on current vent settings PRVC/18/550/+5/30% ; Trach Portex 8  Suction and trach care done as needed throughout the day.  Pt tolerating and saturating appropriately.  RT to continue to monitor.

## 2024-12-18 NOTE — CM/SW NOTE
DIMPLE was informed that Alisha's sister Julienne was at bedside.  Julienne was not listed on the contact list.  Alisha is alert and did communicate to Julienne hat she wants her rehab at [facility].  Julienne also communicated that she spoke to her sister Bambi and Bambi is aware of this decision.    [facility] will not require financial paperwork which the family has not been able to obtain.  [facility] will accept with current insurance which is active.  Medicaid application was also started during this stay.    Alisha should be ready for transfer on 12/19.  Alisha is now alert and has made additional improvement over the last two days.    Updates sent this evening to [facility].  Contact information for Julienne updated in Aidin.    PLAN:  Insurance will be opened for [facility] on 12/18 am.  Transfer to [facility] once medically stable and insurance auth is received.    Zohra Melendez MBA BSN RN CRRN   RN Case Manager  [phone]

## 2024-12-18 NOTE — RESPIRATORY THERAPY NOTE
Patient with tracheostomy received on ventilator support. PRVC/AC 18/550/+5/30%. SBT 5/5 done today for 2 hours, patient tolerated well then placed back on full support. Respiratory status stable and tolerating ventilator well, suctioned as needed.

## 2024-12-18 NOTE — PROGRESS NOTES
[facility]     Gastroenterology Progress Note    Alisha Wilde Patient Status:  Inpatient    10/25/1965 MRN [id number]   Location [facility] 2W/SW Attending Missy Paulson MD   Hosp Day # 26 PCP Bridger Avendano MD       Subjective:    Had another large bm this morning  She denies abdominal pain.  D/w RN -- no emesis overnight. Minimal output g tube  Objective:   Blood pressure 138/69, pulse 77, temperature 98.6 °F (37 °C), temperature source Temporal, resp. rate 19, height 5' 5\" (1.651 m), weight 286 lb 6 oz (129.9 kg), SpO2 100%, not currently breastfeeding. Body mass index is 47.66 kg/m².    Gen:awake, no acute distress  HEENT: mucus membranes appear moist, trach to vent  CV: RRR  Lung: mechanical breath sounds  Abdomen: soft NT, seems less distended today, peg luq  Skin: dry, warm, no jaundice  Ext: +edema  Neuro: appropriate    Assessment and Plan:   Alisha Wilde is a 59 year old female w/ PMHx of hypertension, GERD, CKD, hyperlipidemia who presented to ER 2024 for chest pain. Underwent emergent repair of aortic type A dissection on 2024. Inability to wean from the ventillator now s/p trach and PEG placement -.       # Prolonged sb ileus, improved  -s/p PEG   -CT abdomen pelvis 12/10 with dilated fluid-filled loops of small bowel, no transition point  -output from PEG appears to have slowed down   -KUB shows no further dilation of sb which is an improvement  - continue scheduled reglan to promote gastric motility   - start trickle feeds today, d/w RN  - ok to use peg for meds  -remains on TPN  -Avoid dysmotility agents      Toma Barrientos MD      Results:     Lab Results   Component Value Date    WBC 10.5 2024    HGB 8.1 (L) 2024    HCT 25.5 (L) 2024    .0 2024    CREATSERUM 1.12 (H) 2024    BUN 20 2024     2024    K 4.0 2024     2024    CO2 26.0 2024     (H) 2024    CA  9.0 12/18/2024    ALB 3.2 12/15/2024    ALKPHO 68 12/15/2024    BILT 0.6 12/15/2024    TP 5.7 12/15/2024    AST 25 12/15/2024    ALT 23 12/15/2024    PTT 30.7 12/04/2024    INR 1.17 12/04/2024    TSH 2.085 12/07/2024    NATHALIE 99 11/25/2024    LIP 28 11/25/2024    DDIMER 0.42 12/08/2019    ESRML 15 06/05/2021    MG 2.1 12/18/2024    PHOS 3.7 12/18/2024    TROP <0.045 12/08/2019     (H) 09/23/2021    B12 1,156 (H) 07/23/2018       No results found.

## 2024-12-18 NOTE — CM/SW NOTE
Updates were sent via Aidin to [facility] late yesterday.  Insurance auth initiated this am.    Pending insurance approval for transfer to [facility].    Zohra GONZALEZA BSN RN CRRN   RN Case Manager  [phone]

## 2024-12-18 NOTE — PROGRESS NOTES
[facility]  part of [facility]    Cardiology Progress Note    Alisha Wilde Patient Status:  Inpatient    10/25/1965 MRN [id number]   Location [facility] 2W/SW Attending Aguila Villegas MD   Hosp Day # 26 PCP Bridger Avendano MD     Interval History   Denies any new complaints  Remains on Labetalol and Cardene gtt as PEG tube remains in LIS, on TPN  Assessment and Plan:   Acute hypoxic respiratory failure  Multiple aspiration events, emesis  Aspiration PNA  Suspected ileus  Type A aortic dissection  NAIMA on CKD-III, improved  Obesity  EtOH abuse  -presented with acute onset CP   -CT with type A aortic dissection above right coronary artery and extending distally into the left common iliac artery and external iliac   -s/p emergent successful repair on 24  -course complicated by failure to extubated resulting in Trach/PEG tube placement and multiple aspiration events associated with ileus    -CXR on  with likely aspiration PNA   -started on scheduled Reglan per GI    Hypertension  -TTE on  with hyperdynamic LVEF  -continue BP management - only on IV therapies as pt PEG tube to LIS due to recurrent emesis and aspiration events with underlying ileus   -holding coreg from 37.5 mg, clonidine  0.3 mg 3 times daily, combination hydralazine + isosorbide 150mg-40mg TID, amlodipine 10 mg daily   -continue nicardipine gtt   -continue labetalol gtt   -continue clonidine patch  -monitor rhythm on tele    Volume overload  HFpEF  -remains significantly overloaded on exam  -appreciate renal management of IV diuresis, closely monitor renal function/lytes    Objective:   Patient Vitals for the past 24 hrs:   BP Temp Temp src Pulse Resp SpO2 Weight   24 1500 119/66 -- -- 70 18 92 % --   24 1400 124/66 -- -- 71 18 95 % --   24 1300 122/66 -- -- 68 18 97 % --   24 1200 124/68 98.4 °F (36.9 °C) Axillary 68 18 97 % --   24 1100 127/75 -- -- 69 18 98 % --    11/26/24 1000 129/71 -- -- 69 18 97 % --   11/26/24 0900 128/69 -- -- 70 18 97 % --   11/26/24 0800 (!) 143/118 97.7 °F (36.5 °C) Temporal 70 18 98 % --   11/26/24 0700 130/70 -- -- 69 18 99 % --   11/26/24 0621 -- -- -- -- -- -- 282 lb 3 oz (128 kg)   11/26/24 0600 95/53 -- -- 68 21 97 % --   11/26/24 0500 121/67 -- -- 70 18 97 % --   11/26/24 0400 119/68 98.4 °F (36.9 °C) Temporal 72 18 98 % --   11/26/24 0200 109/60 -- -- 72 21 96 % --   11/26/24 0130 -- -- -- 72 18 97 % --   11/26/24 0100 142/73 -- -- 71 18 98 % --   11/26/24 0030 -- -- -- 71 18 99 % --   11/26/24 0000 139/70 98.3 °F (36.8 °C) Temporal 70 18 98 % --   11/25/24 2300 130/69 -- -- 72 18 98 % --   11/25/24 2200 135/70 -- -- 71 18 98 % --   11/25/24 2100 138/71 -- -- 71 18 99 % --   11/25/24 2000 132/68 98.1 °F (36.7 °C) Temporal 69 18 99 % --   11/25/24 1900 124/64 -- -- 71 18 98 % --   11/25/24 1800 136/72 -- -- 71 18 98 % --   11/25/24 1700 138/70 -- -- 70 18 98 % --       Intake/Output:   Last 3 shifts:   Intake/Output                   11/24/24 0700 - 11/25/24 0659 11/25/24 0700 - 11/26/24 0659 11/26/24 0700 - 11/27/24 0659       Intake    P.O.  0  0  --    P.O. 0 0 --    I.V.  2276.7  1884.9  --    I.V. 154 615 --    Volume (mL) Insulin 53.9 37 --    Volume (mL) Nitroglycerin 236.9 54.5 --    Volume (mL) Dobutamine 4.5 -- --    Volume (mL) Propofol 477.8 713.9 --    Volume (mL) Dexmedetomidine 352.2 384.5 --    Volume (mL) (dextrose 5%-sodium chloride 0.45% infusion) 997.4 80 --    NG/GT  50  150  60    Intake (mL) (NG/OG Tube Orogastric 16 Fr. Right mouth) 50 150 60    IV PIGGYBACK  600  500  --    Volume (mL) (piperacillin-tazobactam (Zosyn) 3.375 g in dextrose 5% 100 mL IVPB-ADDV) 300 200 --    Volume (mL) (acetaminophen (Ofirmev) 10 mg/mL infusion premix 1,000 mg) 200 100 --    Volume (mL) (potassium chloride 20 mEq/100mL IVPB premix 20 mEq) -- 100 --    Volume (mL) (potassium chloride 40 mEq/100mL IVPB premix (central line) 40 mEq) 100  100 --    Total Intake 2926.7 2534.9 60       Output    Urine  1800  3570  800    Output (mL) (Urethral Catheter Latex;Temperature probe;Double-lumen) 1800 3570 800    Emesis/NG output  550  365  --    Residual volume (ml) (NG/OG Tube Orogastric 16 Fr. Right mouth) -- 5 --    Output (mL) (NG/OG Tube Orogastric 16 Fr. Right mouth) 550 360 --    Chest Tube  188  125  30    Output (mL) ([REMOVED] Chest Tube Anterior Mediastinal 32 Fr.) 48 50 --    Output (mL) (Chest Tube Anterior Pericardial 24 Fr.) 140 75 30    Total Output 2538 4060 830       Net I/O     388.7 -1525.1 -770             Vent Settings: Vent Mode: PRVC/AC  FiO2 (%):  [30 %] 30 %  S RR:  [18] 18  S VT:  [550 mL-630 mL] 550 mL  PEEP/CPAP (cm H2O):  [5 cm H20] 5 cm H20  MAP (cm H2O):  [10-12] 12    Hemodynamic parameters (last 24 hours):      Scheduled Meds:    metoclopramide  10 mg Intravenous Q6H    pantoprazole  40 mg Intravenous Daily    furosemide  40 mg Intravenous BID (Diuretic)    LORazepam  0.5 mg Intravenous QID    cloNIDine  1 patch Transdermal Weekly    piperacillin-tazobactam  3.375 g Intravenous Q8H    carvedilol  37.5 mg Oral BID with meals    aspirin  81 mg Peg Tube Daily    isosorbide dinitrate  40 mg Per NG Tube TID (Nitrates)    hydrALAZINE  150 mg Oral Q8H GABRIEL    amLODIPine  10 mg Oral Daily    heparin  5,000 Units Subcutaneous Q8H GABRIEL    chlorhexidine gluconate  15 mL Mouth/Throat BID       Continuous Infusions:    adult 3 in 1 TPN 83.3 mL/hr at 12/17/24 2044    dextrose 10%      labetalol (Trandate) 400 mg in sodium chloride 0.9% 200 mL infusion 1 mg/min (12/18/24 0824)    dextrose 10%      dexmedetomidine Stopped (12/17/24 0600)    niCARdipine 15 mg/hr (12/18/24 1115)       Results:     Lab Results   Component Value Date    WBC 10.5 12/18/2024    HGB 8.1 (L) 12/18/2024    HCT 25.5 (L) 12/18/2024    .0 12/18/2024    CREATSERUM 1.12 (H) 12/18/2024    BUN 20 12/18/2024     12/18/2024    K 4.0 12/18/2024     12/18/2024     CO2 26.0 12/18/2024     (H) 12/18/2024    CA 9.0 12/18/2024    ALB 3.2 12/15/2024    ALKPHO 68 12/15/2024    BILT 0.6 12/15/2024    TP 5.7 12/15/2024    AST 25 12/15/2024    ALT 23 12/15/2024    PTT 30.7 12/04/2024    INR 1.17 12/04/2024    TSH 2.085 12/07/2024    NATHALIE 99 11/25/2024    LIP 28 11/25/2024    DDIMER 0.42 12/08/2019    ESRML 15 06/05/2021    MG 2.1 12/18/2024    PHOS 3.7 12/18/2024    TROP <0.045 12/08/2019     (H) 09/23/2021    B12 1,156 (H) 07/23/2018       Recent Labs   Lab 12/16/24  1129 12/17/24  0454 12/18/24  0409   * 157* 162*   BUN 24* 22 20   CREATSERUM 1.19* 1.11* 1.12*   CA 8.6* 8.7 9.0    141 140   K 4.5 4.0 4.0    109 105   CO2 23.0 24.0 26.0     Recent Labs   Lab 12/12/24  0443 12/13/24  0904 12/16/24  0508 12/17/24  0454 12/18/24  0409   RBC 2.95*   < > 3.12* 3.13* 3.10*   HGB 8.3*   < > 8.5* 8.3* 8.1*   HCT 25.3*   < > 26.2* 26.0* 25.5*   MCV 85.8   < > 84.0 83.1 82.3   MCH 28.1   < > 27.2 26.5 26.1   MCHC 32.8   < > 32.4 31.9 31.8   RDW 16.8*   < > 19.5* 19.5* 19.2*   NEPRELIM 3.64  --   --  6.60 6.96   WBC 6.5   < > 9.8 10.8 10.5   .0   < > 222.0 225.0 207.0    < > = values in this interval not displayed.       No results for input(s): \"BNPML\" in the last 168 hours.    No results for input(s): \"TROP\", \"CK\" in the last 168 hours.    XR CHEST AP PORTABLE  (CPT=71045)    Result Date: 12/16/2024  CONCLUSION: Right basal pulmonary opacity suggesting pneumonia.  No significant interval change since most recent exam from 12/15/24.  Findings are worse since 12/12/24.    Dictated by (CST): Jagjit Jin MD on 12/16/2024 at 10:19 AM     Finalized by (CST): Jagjit Jin MD on 12/16/2024 at 10:20 AM          XR ABDOMEN (1 VIEW) (CPT=74018)    Result Date: 12/16/2024  CONCLUSION: Nonspecific-nonobstructive bowel gas pattern.  Dilated bowel loops demonstrated on preceding exam is not redemonstrated.    Dictated by (CST): Jagjit Jin MD on 12/16/2024 at 10:16  AM     Finalized by (CST): Jagjit Jin MD on 12/16/2024 at 10:18 AM                    Exam:     Physical Exam:  General: awake/alert  HEENT: +trach  Neck: No JVD, carotids 2+, no bruits.  Cardiac: Regular rate and rhythm. S1, S2 normal.   Lungs: +rhonchi   Abdomen: Soft, non-tender.BS-present.  Extremities: Without clubbing or cyanosis. + BLE edema.    Neurologic: unable to obtain  Skin: Warm and dry.     Edward Montgomery, [facility] [place]

## 2024-12-18 NOTE — PLAN OF CARE
Patient awake and responsive; nods/gestures, can utilize communication board.  Labetalol and cardene gtt titrated to control blood pressure.  TPN continued. Webb intact with good urine output. PEG to LIS          Problem: RESPIRATORY - ADULT  Goal: Achieves optimal ventilation and oxygenation  Description: INTERVENTIONS:  - Assess for changes in respiratory status  - Assess for changes in mentation and behavior  - Position to facilitate oxygenation and minimize respiratory effort  - Oxygen supplementation based on oxygen saturation or ABGs  - Provide Smoking Cessation handout, if applicable  - Encourage broncho-pulmonary hygiene including cough, deep breathe, Incentive Spirometry  - Assess the need for suctioning and perform as needed  - Assess and instruct to report SOB or any respiratory difficulty  - Respiratory Therapy support as indicated  - Manage/alleviate anxiety  - Monitor for signs/symptoms of CO2 retention  Outcome: Progressing     Problem: CARDIOVASCULAR - ADULT  Goal: Maintains optimal cardiac output and hemodynamic stability  Description: INTERVENTIONS:  - Monitor vital signs, rhythm, and trends  - Monitor for bleeding, hypotension and signs of decreased cardiac output  - Evaluate effectiveness of vasoactive medications to optimize hemodynamic stability  - Monitor arterial and/or venous puncture sites for bleeding and/or hematoma  - Assess quality of pulses, skin color and temperature  - Assess for signs of decreased coronary artery perfusion - ex. Angina  - Evaluate fluid balance, assess for edema, trend weights  Outcome: Progressing  Goal: Absence of cardiac arrhythmias or at baseline  Description: INTERVENTIONS:  - Continuous cardiac monitoring, monitor vital signs, obtain 12 lead EKG if indicated  - Evaluate effectiveness of antiarrhythmic and heart rate control medications as ordered  - Initiate emergency measures for life threatening arrhythmias  - Monitor electrolytes and administer replacement  therapy as ordered  Outcome: Progressing     Problem: GASTROINTESTINAL - ADULT  Goal: Minimal or absence of nausea and vomiting  Description: INTERVENTIONS:  - Maintain adequate hydration with IV or PO as ordered and tolerated  - Nasogastric tube to low intermittent suction as ordered  - Evaluate effectiveness of ordered antiemetic medications  - Provide nonpharmacologic comfort measures as appropriate  - Advance diet as tolerated, if ordered  - Obtain nutritional consult as needed  - Evaluate fluid balance  Outcome: Progressing  Goal: Maintains or returns to baseline bowel function  Description: INTERVENTIONS:  - Assess bowel function  - Maintain adequate hydration with IV or PO as ordered and tolerated  - Evaluate effectiveness of GI medications  - Encourage mobilization and activity  - Obtain nutritional consult as needed  - Establish a toileting routine/schedule  - Consider collaborating with pharmacy to review patient's medication profile  Outcome: Progressing     Problem: Diabetes/Glucose Control  Goal: Glucose maintained within prescribed range  Description: INTERVENTIONS:  - Monitor Blood Glucose as ordered  - Assess for signs and symptoms of hyperglycemia and hypoglycemia  - Administer ordered medications to maintain glucose within target range  - Assess barriers to adequate nutritional intake and initiate nutrition consult as needed  - Instruct patient on self management of diabetes  Outcome: Progressing     Problem: METABOLIC/FLUID AND ELECTROLYTES - ADULT  Goal: Glucose maintained within prescribed range  Description: INTERVENTIONS:  - Monitor Blood Glucose as ordered  - Assess for signs and symptoms of hyperglycemia and hypoglycemia  - Administer ordered medications to maintain glucose within target range  - Assess barriers to adequate nutritional intake and initiate nutrition consult as needed  - Instruct patient on self management of diabetes  Outcome: Progressing  Goal: Electrolytes maintained within  normal limits  Description: INTERVENTIONS:  - Monitor labs and rhythm and assess patient for signs and symptoms of electrolyte imbalances  - Administer electrolyte replacement as ordered  - Monitor response to electrolyte replacements, including rhythm and repeat lab results as appropriate  - Fluid restriction as ordered  - Instruct patient on fluid and nutrition restrictions as appropriate  Outcome: Progressing  Goal: Hemodynamic stability and optimal renal function maintained  Description: INTERVENTIONS:  - Monitor labs and assess for signs and symptoms of volume excess or deficit  - Monitor intake, output and patient weight  - Monitor urine specific gravity, serum osmolarity and serum sodium as indicated or ordered  - Monitor response to interventions for patient's volume status, including labs, urine output, blood pressure (other measures as available)  - Encourage oral intake as appropriate  - Instruct patient on fluid and nutrition restrictions as appropriate  Outcome: Progressing

## 2024-12-18 NOTE — PROGRESS NOTES
Pulmonary/ICU/Critical Care Progress Note        Reason for Consultation: post op care  Referring Physician: Dr. Gregg        SUBJECTIVE:  Vomited again yesterday.       ALLERGIES:  Allergies[1]        MEDS:  Home Medications:  Medications Taking[2]      Scheduled Medication:   metoclopramide  10 mg Intravenous Q6H    pantoprazole  40 mg Intravenous Daily    furosemide  40 mg Intravenous BID (Diuretic)    LORazepam  0.5 mg Intravenous QID    cloNIDine  1 patch Transdermal Weekly    piperacillin-tazobactam  3.375 g Intravenous Q8H    carvedilol  37.5 mg Oral BID with meals    aspirin  81 mg Peg Tube Daily    isosorbide dinitrate  40 mg Per NG Tube TID (Nitrates)    hydrALAZINE  150 mg Oral Q8H GABRIEL    amLODIPine  10 mg Oral Daily    heparin  5,000 Units Subcutaneous Q8H GABRIEL    chlorhexidine gluconate  15 mL Mouth/Throat BID     Continuous Infusing Medication:   adult 3 in 1 TPN 83.3 mL/hr at 12/17/24 2044    dextrose 10%      labetalol (Trandate) 400 mg in sodium chloride 0.9% 200 mL infusion 1 mg/min (12/18/24 0824)    dextrose 10%      dexmedetomidine Stopped (12/17/24 0600)    niCARdipine 15 mg/hr (12/18/24 0507)     PRN Medications:    dextrose 10%    lipase-protease-amylase (Lip-Prot-Amyl) **AND** sodium bicarbonate    LORazepam    dextrose 10%    fentaNYL    fentaNYL    bisacodyl    acetaminophen    sodium chloride    albuterol    hydrALAzine    acetaminophen    ipratropium-albuterol    HYDROcodone-acetaminophen    melatonin    potassium chloride **OR** potassium chloride    magnesium sulfate in dextrose 5%    magnesium sulfate in sterile water for injection       PHYSICAL EXAM:  /69 (BP Location: Left arm)   Pulse 77   Temp 98.6 °F (37 °C) (Temporal)   Resp 19   Ht 5' 5\" (1.651 m)   Wt 286 lb 6 oz (129.9 kg)   SpO2 100%   BMI 47.66 kg/m²   Vent Mode: PRVC/AC  FiO2 (%):  [30 %] 30 %  S RR:  [18] 18  S VT:  [550 mL-630 mL] 550 mL  PEEP/CPAP (cm H2O):  [5 cm H20] 5 cm H20  MAP (cm H2O):  [10-12]  12  CONSTITUTIONAL: intubated awake just vomited  HEENT: atraumatic normocephalic  MOUTH: MMM  NECK/THROAT: no JVD. Trachea midline. No obvious thyromegaly. + trach with min bleeding. + emesis in mouth and around trach  LUNG: coarse BS b/l no wheezing, + crackles. Diminished at bases. Chest symmetric with respiratory motion. + midsternal surgical wound healing   HEART: tachy but regular rate and rhythm, no obvious murmers or gallops noted  ABD: soft non distended not tender. + hypoactive bowel sounds. No organomegaly noted. + PEG tube to LIS  EXT: no clubbing, cyanosis, or edema noted. Pulses intact grossly      IMAGES:   KUB 12/16/24  CONCLUSION: Nonspecific-nonobstructive bowel gas pattern.  Dilated bowel loops demonstrated on preceding exam is not redemonstrated.     CXR 12/16/24  CONCLUSION: Right basal pulmonary opacity suggesting pneumonia.  No significant interval change since most recent exam from 12/15/24.  Findings are worse since 12/12/24.     CXR 12/15/24  On my read RLL infiltrates more dense but less pulm edema  CARDIAC/MEDIAST: Heart and mediastinum are unchanged with previous median sternotomy.   LUNGS/PLEURA: Right greater left patchy bilateral lung opacity which may be mildly increased in the right lung base.  No significant pleural effusion.  No pneumothorax.   OTHER: Stable appearance of the osseous structures.  Tracheostomy tube and right PICC line also unchanged.     KUB 12/14/24  No change in dilation of SB  Significantly limited examination.   Ongoing dilation of small bowel loops up to 4.7 cm, previously 4.5 cm. Recommend continued radiographic/clinical follow-up.       CXR 12/12/24  CONCLUSION:   1. Cardiomegaly.  Tortuous aorta.   2. Bilateral mixed multifocal airspace consolidation with slight improved aeration left lung base.   3. Tracheostomy tube overlies the tracheal air column.  Right PICC line with the tip in the SVC.      KUB 12/12/24  CONCLUSION:   1. Nonspecific small bowel  dilatation left mid abdomen and central abdomen measures up to 4.5 cm.   2. Mild stool scattered throughout the colon suggests constipation fecal retention.      CT A/P 12/10/24  CONCLUSION:   1. Fluid-filled loops of small bowel, which could reflect underlying infectious/inflammatory enteritis or malabsorption in the appropriate clinical setting. Bowel loops are mildly dilated without clear transition point to suggest mechanical bowel   obstruction, although early or partial bowel obstruction could have a similar appearance.   2. Extensive distal colonic diverticulosis without CT evidence of acute complication.    3. Bladder distension despite Webb catheter placement.   4. Hepatomegaly with hepatic steatosis.   5. Diffuse anasarca.   6. Partially visualized postthoracotomy chest with possible postoperative seroma/hematoma of the anterior chest wall.   7. Bilateral pleural effusions and associated basilar atelectasis, with or without superimposed pneumonia.   8. Additional scattered patchy nodular opacities may reflect infectious process.    9. Low-density appearance of the intracardiac blood pool raises the possibility of underlying anemia. Correlate with hematologic parameters.   10. Lesser incidental findings as above.     CXR 12/9/24  CONCLUSION:   1. Cardiomegaly.  Atherosclerotic aneurysmal thoracic aorta   2. Tracheostomy tube overlies tracheal air column.   3.  Bilateral mixed alveolar and interstitial multifocal airspace opacification has progressed.        KUB 12/9/24  CONCLUSION:   No huang dilatation of the small bowel to suggest small bowel obstruction.   Large cecal stool burden which may indicate constipation. Mild stool burden throughout the remainder of the colon.     CXR 12/5/24  No significant change when compared to 12/04/2024.     CXR 12/4/24  CONCLUSION:   1. Mild cardiomegaly and minimally prominent pulmonary vascularity but no huang pulmonary edema.  ET tube and NG tubes have been removed.   Tracheostomy is now noted in the trachea extending to the level the clavicles.  No pneumothorax.   2. Persistent patchy bilateral upper lobe airspace disease right more than left suggesting persistent bilateral upper lobe pneumonia.  Correlate clinically and continued follow-up is advised.     CXR 12/2/24  FINDINGS:   POSITION: The patient is semi-erect and slightly rotated to the right.   DEVICES: There is an endotracheal tube terminating approximately 3.9 cm above the jennyfer.  A large-bore right internal jugular central venous vascular access sheath has tip projecting in the SVC. An enteric tube extends caudally beyond the field of view.   CARDIAC/VASC: The cardiomediastinal silhouette is accentuated by AP technique, but likely stably enlarged. There is mild tortuosity of the thoracic aorta with peripheral atherosclerotic vascular calcification. The pulmonary vascularity is within normal   limits.   MEDIAST/HAMLET: Surgical clips are present.   LUNGS/PLEURA: Elevation right hemidiaphragm is noted. Multifocal patchy airspace consolidation is demonstrated, slightly worse on the right side than left. Mild interstitial opacities are seen. Additional scattered reticular opacities may be atelectatic   in nature. No large pleural effusion or pneumothorax is evident.    BONES: Median sternotomy wires are demonstrated. Mild degenerative changes of the thoracic spine are apparent. There is no fracture or visible bony lesion.   OTHER: There may be surgical clips at the level of the thoracic inlet. Leads overlie the chest and obscure underlying detail     CXR 11/27/24  Moderate pulmonary edema, progressed since 11/26/2024.     CT c/a/p  CONCLUSION:  Low-lying ETT, 0.8 cm above the jennyfer   Multifocal bilateral pulmonary nodular consolidation s    CXR 11/24/24  FINDINGS:   CARDIAC/VASC: The cardiac silhouette is exaggerated by AP portable technique. There is stable central pulmonary venous congestion.   MEDIAST/HAMLET:   No  visible mass or adenopathy.   LUNGS/PLEURA: There are stable streaky opacities at the right lung base.  No pleural effusion or pneumothorax.   BONES: Sternotomy changes are again noted.   OTHER: An endotracheal tube tip projects 3.8 cm above the jennyfer.  An enteric tube again courses into the left upper quadrant.  A right internal jugular approach Dovray-Dev catheter tip again projects over the pulmonary outflow tract.  A mediastinal drain tip again projects over the right suprahilar region.     CXR 11/23/24  On my read possible RML infiltrates  CONCLUSION: Post emergent acute type A aortic dissection repair.  Stable cardiomegaly.  Gross stable/satisfactory position of lines and tubes.  Mild pulmonary vascular congestion.       CXR 11/22/24  CONCLUSION: Post feeding tube placement with tip in the distal esophagus approximately 4 cm above the gastroesophageal junction.  Recommend advancement of the tube by approximately 10 cm to place it in the stomach.     CXR 11/22/24  CARDIAC/MEDIASTINUM: The cardiac silhouette is enlarged and unchanged.. There is a right internal jugular Dovray-Dev catheter with tip at the level of the main pulmonary artery. There is a right-sided chest tube. Endotracheal tube with tip approximately    5.3 cm above the jennyfer. There is a nasogastric tube with tip and sidehole within the stomach and below the diaphragm. There are median sternotomy wires and postoperative changes of ascending aortic repair.   LUNGS: There is pulmonary vascular redistribution without edema. Minimal blunting of the bilateral costophrenic angles may be secondary to trace pleural effusions versus chronic pleural thickening. There is mild bibasilar atelectasis. No pneumothorax.   BONES: There is degenerative disease of the thoracic spine.     Chest CTA 11/22/24  CONCLUSION:   1. Type a aortic dissection beginning just above right renal (correction:  Right coronary)  artery and extending distally into the left common iliac  artery and external iliac.  Findings were called to Dr. Echavarria at 5:52 p.m.       LABS:  Recent Labs   Lab 12/12/24  0443 12/13/24  0904 12/16/24  0508 12/17/24  0454 12/18/24  0409   RBC 2.95*   < > 3.12* 3.13* 3.10*   HGB 8.3*   < > 8.5* 8.3* 8.1*   HCT 25.3*   < > 26.2* 26.0* 25.5*   MCV 85.8   < > 84.0 83.1 82.3   MCH 28.1   < > 27.2 26.5 26.1   MCHC 32.8   < > 32.4 31.9 31.8   RDW 16.8*   < > 19.5* 19.5* 19.2*   NEPRELIM 3.64  --   --  6.60 6.96   WBC 6.5   < > 9.8 10.8 10.5   .0   < > 222.0 225.0 207.0    < > = values in this interval not displayed.       Recent Labs   Lab 12/13/24  0612 12/14/24  0631 12/15/24  0445 12/16/24  0508 12/16/24  1129 12/17/24  0454 12/18/24  0409   *   < > 160*  160*   < > 168* 157* 162*   BUN 27*   < > 27*  27*   < > 24* 22 20   CREATSERUM 1.28*   < > 1.24*  1.24*   < > 1.19* 1.11* 1.12*   EGFRCR 48*   < > 50*  50*   < > 52* 57* 56*   CA 8.4*   < > 8.5*  8.5*   < > 8.6* 8.7 9.0   ALB 3.3  --  3.2  --   --   --   --    *   < > 146*  146*   < > 140 141 140   K 3.4*  3.4*   < > 4.6  4.6   < > 4.5 4.0 4.0   *   < > 116*  116*   < > 110 109 105   CO2 21.0   < > 23.0  23.0   < > 23.0 24.0 26.0   ALKPHO 73  --  68  --   --   --   --    AST 15  --  25  --   --   --   --    ALT 16  --  23  --   --   --   --    BILT 0.5  --  0.6  --   --   --   --    TP 5.8  --  5.7  --   --   --   --     < > = values in this interval not displayed.       ASSESSMENT/PLAN:  Type A aortic dissection s/p repair now intubated in ICU  -on nicardipine/labetolol  -multiple oral antiHTN meds for BP being held d/t ileus/SBO  -s/p pRBC transfusion 12/14/24 for anemia. No obvious s/s bleeding    Post op acute respiratory failure  -complicated by extubation and reimergent intubation and significant emesis concerning for aspiration PNA vs pneumonitis  -started on zosyn-completed initial 10 day course  -checked ETT asp-candida likely contaminant  -failed multiple SBTs d/t increased  RR, work of breathing, hypertension, hypoxemia, significant thick secretions  -remains MV dependent   -hx of likely TANYA and previous smoking hx. Has sign dense consolidations and atelectasis on chest CT  -s/p trach by ENT on 12/4/24 and PEG by GI on 12/5/24  -start trach/vent weaning as able-limited by HTN and  GI issues with recurrent emesis and persistent ileus  -PRN ABG and CXR  -saline nebs  -weaned off precedex-having difficulty weaning as pt becomes agitated and hypertensive   -psych consult for assistance with sedation and agitation-defer to their recs  -on scheduled clonidine patch in attempt to wean off precedex  -restarted on zosyn for recurrent aspiration   -SBT 12/17/24 for 90 min    Previous hx of smoking and ETOH  -continue duonebs PRN    NAIMA on CKD and metabolic acidosis  -likely from surgery, and acute issues  -monitor UO and renal labs  -renal following   -diuresis as per cardiology and renal    Abd pain  -s/p abd CT as above  -not tolerating TF now  -PEG tube to LIS  -recurrent aspiration   -GI following    Proph:  -DVT: hep sq    Dispo:  -full code  -SW/ to assist with LTAC placement    Critical care time 30 min independent of procedures      Thank you for the opportunity to care for Alisha Wilde.      SIDDHARTH Rainey DO, MPH  Pulmonary Critical Care Medicine  [facility]                       [1]   Allergies  Allergen Reactions    Atorvastatin MYALGIA    Pravastatin MYALGIA    Rosuvastatin MYALGIA    Seasonal Runny nose   [2]   Outpatient Medications Marked as Taking for the 11/21/24 encounter (Hospital Encounter)   Medication Sig Dispense Refill    Acetaminophen ER (TYLENOL 8 HOUR) 650 MG Oral Tab CR Take 2 tablets (1,300 mg total) by mouth daily as needed.      Calcium Carb-Cholecalciferol 600-10 MG-MCG Oral Tab Take 1 tablet by mouth daily.      metoprolol succinate ER 50 MG Oral Tablet 24 Hr Take 1 tablet (50 mg total) by mouth daily. 90  tablet 3    Losartan Potassium-HCTZ 100-12.5 MG Oral Tab Take 1 tablet by mouth daily. 90 tablet 3    Potassium Chloride ER 20 MEQ Oral Tab CR Take 1 tablet by mouth daily. 90 tablet 3    albuterol 108 (90 Base) MCG/ACT Inhalation Aero Soln Inhale 2 puffs into the lungs every 4 (four) hours as needed for Wheezing. 3 each 3    amLODIPine 10 MG Oral Tab Take 1 tablet (10 mg total) by mouth daily. 90 tablet 3    Ascorbic Acid (VITAMIN C) 1000 MG Oral Tab Take 1 tablet (1,000 mg total) by mouth daily.      vitamin B-12 50 MCG Oral Tab Take 1 tablet (50 mcg total) by mouth daily.

## 2024-12-18 NOTE — PLAN OF CARE
Problem: RESPIRATORY - ADULT  Goal: Achieves optimal ventilation and oxygenation  Description: INTERVENTIONS:  - Assess for changes in respiratory status  - Assess for changes in mentation and behavior  - Position to facilitate oxygenation and minimize respiratory effort  - Oxygen supplementation based on oxygen saturation or ABGs  - Provide Smoking Cessation handout, if applicable  - Encourage broncho-pulmonary hygiene including cough, deep breathe, Incentive Spirometry  - Assess the need for suctioning and perform as needed  - Assess and instruct to report SOB or any respiratory difficulty  - Respiratory Therapy support as indicated  - Manage/alleviate anxiety  - Monitor for signs/symptoms of CO2 retention  Outcome: Progressing     Pt received on MV, settings follow.  No acute events over night.  RT continue to monitor.      12/18/24 0030   Vent Information   Vent Mode PRVC/AC   Settings   FiO2 (%) 30 %   Resp Rate (Set) 18   Vt (Set, mL) 550 mL   Waveform Decelerating ramp   PEEP/CPAP (cm H2O) 5 cm H20

## 2024-12-18 NOTE — PROGRESS NOTES
Patient is a 59 year old -American, single female with past medical history of CKD, hypertension, high cholesterol who was admitted to the hospital for aortic dissection. The patient has been demonstrating alternation in mood and cognition with episodes of increased restlessness and agitation. According to team, they are trying to wean patient of propofol and precedex. Patient indicated for psych consult for evaluation and advise.  Consult Duration     The patient seen for over 35-minute, follow-up evaluation, over 50% counseling and coordinating care addressing agitation, restlessness, medication management..    Record reviewed, communication with attending, communication with RN and patient seen face to face evaluation.  History of Present Illness:     I signed off the patient yesterday after her delirium subsided and the patient has been free of Precedex with some cognitive improvement and orientation.    Otherwise the medical team reach out that patient has been suicidal.    The patient seen today in the room and the patient presented alert and oriented.  Patient reporting \"I am very stressed\".  The patient with a soft speech reporting that she has history of depression before but being in the hospital has been causing her to feel overwhelmed.  Patient admitted that she has been feeling hopeless and helpless.  Patient reporting that occasionally she go into a dark place.  Patient repeatedly denies suicidal ideation, intention or plan.    Validating the patient for her anxiety and the extensive medical condition.  Patient became tearful nodding in her head.    The patient did not demonstrate any auditory or visual hallucination.  Patient did not demonstrate response to internal stimuli.    The patient has not been taking any sedative medication but only lorazepam 0.5 mg 3 times daily.    Provided patient with supportive psychotherapy including emotional support and encouragement.    Communicate with the  team.    Past Medical History  Past Medical History:    Chronic kidney disease (CKD)    Esophageal reflux    High blood pressure    High cholesterol    HYPERTENSION    Hypertension    Unspecified essential hypertension       Past Surgical History  Past Surgical History:   Procedure Laterality Date    Colonoscopy N/A 2018    Procedure: COLONOSCOPY;  Surgeon: Magdy Webber MD;  Location: [facility] ENDOSCOPY    Endometrial ablation      child passed away for 5 mins          1    Other surgical history  11    cysto-Dr. Lacey -- pt denies       Family History  Family History   Problem Relation Age of Onset    Hypertension Mother     Heart Disorder Mother 70    Other (Other) Mother         kidney failure    Hypertension Father     Hypertension Maternal Grandfather     Cancer Neg     Diabetes Neg     Glaucoma Neg     Macular degeneration Neg        Social History  Social History     Socioeconomic History    Marital status: Single   Tobacco Use    Smoking status: Former     Current packs/day: 0.00     Average packs/day: 1 pack/day for 13.0 years (13.0 ttl pk-yrs)     Types: Cigarettes     Start date:      Quit date:      Years since quittin.9    Smokeless tobacco: Former   Vaping Use    Vaping status: Never Used   Substance and Sexual Activity    Alcohol use: Yes     Alcohol/week: 1.0 - 2.0 standard drink of alcohol     Types: 1 - 2 Cans of beer per week     Comment: every day    Drug use: No     Social Drivers of Health     Food Insecurity: Unknown (2024)    Food Insecurity     Food Insecurity: Patient unable to answer   Transportation Needs: Unknown (2024)    Transportation Needs     Lack of Transportation: Patient unable to answer   Housing Stability: Unknown (2024)    Housing Stability     Housing Instability: Patient unable to answer           Current Medications:  Current Facility-Administered Medications   Medication Dose Route Frequency    adult 3 in 1 TPN    Intravenous Continuous TPN    adult 3 in 1 TPN   Intravenous Continuous TPN    metoclopramide (Reglan) 5 mg/mL injection 10 mg  10 mg Intravenous Q6H    pantoprazole (Protonix) 40 mg in sodium chloride 0.9% PF 10 mL IV push  40 mg Intravenous Daily    furosemide (Lasix) 10 mg/mL injection 40 mg  40 mg Intravenous BID (Diuretic)    dextrose 10% infusion (TPN no rate)   Intravenous Continuous PRN    pancrelipase (Lip-Prot-Amyl) (Zenpep) DR particles cap 10,000 Units  10,000 Units Per G Tube PRN    And    sodium bicarbonate tab 325 mg  325 mg Oral PRN    LORazepam (Ativan) 2 mg/mL injection 0.5 mg  0.5 mg Intravenous QID    LORazepam (Ativan) 2 mg/mL injection 1 mg  1 mg Intravenous Q4H PRN    labetalol (Trandate) 400 mg in sodium chloride 0.9% 200 mL infusion  0.5-10 mg/min Intravenous Continuous    dextrose 10% infusion (TPN no rate)   Intravenous Continuous PRN    fentaNYL (Sublimaze) 50 mcg/mL injection 25 mcg  25 mcg Intravenous Q2H PRN    fentaNYL (Sublimaze) 50 mcg/mL injection 50 mcg  50 mcg Intravenous Q2H PRN    bisacodyl (Dulcolax) 10 MG rectal suppository 10 mg  10 mg Rectal Daily PRN    dexmedeTOMIDine (Precedex) 800 mcg in sodium chloride 0.9% 100 mL infusion  0.2-1.5 mcg/kg/hr (Dosing Weight) Intravenous Continuous    acetaminophen (Ofirmev) 10 mg/mL infusion premix 1,000 mg  1,000 mg Intravenous Q6H PRN    cloNIDine (Catapres) 0.3 MG/24HR patch 1 patch  1 patch Transdermal Weekly    sodium chloride 3 % nebulizer solution 3 mL  3 mL Nebulization PRN    albuterol (Ventolin) (2.5 MG/3ML) 0.083% nebulizer solution 2.5 mg  2.5 mg Nebulization Q6H PRN    piperacillin-tazobactam (Zosyn) 3.375 g in dextrose 5% 100 mL IVPB-ADDV  3.375 g Intravenous Q8H    hydrALAzine (Apresoline) 20 mg/mL injection 10 mg  10 mg Intravenous Q4H PRN    carvedilol (Coreg) tab 37.5 mg  37.5 mg Oral BID with meals    acetaminophen (Tylenol) 160 MG/5ML oral liquid 650 mg  650 mg Oral Q6H PRN    aspirin chewable tab 81 mg  81 mg Peg  Tube Daily    niCARdipine (carDENE) 125 mg in sodium chloride 0.9% 250 mL infusion  5-15 mg/hr Intravenous Continuous    isosorbide dinitrate (Isordil) tab 40 mg  40 mg Per NG Tube TID (Nitrates)    hydrALAZINE (Apresoline) tab 150 mg  150 mg Oral Q8H GABRIEL    amLODIPine (Norvasc) tab 10 mg  10 mg Oral Daily    ipratropium-albuterol (Duoneb) 0.5-2.5 (3) MG/3ML inhalation solution 3 mL  3 mL Nebulization Q6H PRN    HYDROcodone-acetaminophen (Norco) 5-325 MG per tab 2 tablet  2 tablet Oral Q4H PRN    heparin (Porcine) 5000 UNIT/ML injection 5,000 Units  5,000 Units Subcutaneous Q8H GABRIEL    melatonin tab 3 mg  3 mg Oral Nightly PRN    potassium chloride 20 mEq/100mL IVPB premix 20 mEq  20 mEq Intravenous PRN    Or    potassium chloride 40 mEq/100mL IVPB premix (central line) 40 mEq  40 mEq Intravenous PRN    magnesium sulfate in dextrose 5% 1 g/100mL infusion premix 1 g  1 g Intravenous PRN    magnesium sulfate in sterile water for injection 2 g/50mL IVPB premix 2 g  2 g Intravenous PRN    chlorhexidine gluconate (Peridex) 0.12 % oral solution 15 mL  15 mL Mouth/Throat BID     Medications Prior to Admission   Medication Sig    Acetaminophen ER (TYLENOL 8 HOUR) 650 MG Oral Tab CR Take 2 tablets (1,300 mg total) by mouth daily as needed.    Calcium Carb-Cholecalciferol 600-10 MG-MCG Oral Tab Take 1 tablet by mouth daily.    metoprolol succinate ER 50 MG Oral Tablet 24 Hr Take 1 tablet (50 mg total) by mouth daily.    Losartan Potassium-HCTZ 100-12.5 MG Oral Tab Take 1 tablet by mouth daily.    Potassium Chloride ER 20 MEQ Oral Tab CR Take 1 tablet by mouth daily.    albuterol 108 (90 Base) MCG/ACT Inhalation Aero Soln Inhale 2 puffs into the lungs every 4 (four) hours as needed for Wheezing.    amLODIPine 10 MG Oral Tab Take 1 tablet (10 mg total) by mouth daily.    Ascorbic Acid (VITAMIN C) 1000 MG Oral Tab Take 1 tablet (1,000 mg total) by mouth daily.    vitamin B-12 50 MCG Oral Tab Take 1 tablet (50 mcg total) by mouth  daily.    fluticasone propionate 50 MCG/ACT Nasal Suspension 2 sprays by Nasal route daily.    fluticasone-salmeterol 250-50 MCG/ACT Inhalation Aerosol Powder, Breath Activated Inhale 1 puff into the lungs 2 (two) times daily. inhale 1 puff by INHALATION route 2 times every day morning and evening approximately 12 hours apart (Patient not taking: Reported on 11/21/2024)       Allergies  Allergies[1]    Review of Systems:   As by Admitting/Attending    Results:   Laboratory Data:  Lab Results   Component Value Date    WBC 10.5 12/18/2024    HGB 8.1 (L) 12/18/2024    HCT 25.5 (L) 12/18/2024    .0 12/18/2024    CREATSERUM 1.12 (H) 12/18/2024    BUN 20 12/18/2024     12/18/2024    K 4.0 12/18/2024     12/18/2024    CO2 26.0 12/18/2024     (H) 12/18/2024    CA 9.0 12/18/2024    ALB 3.2 12/15/2024    ALKPHO 68 12/15/2024    TP 5.7 12/15/2024    AST 25 12/15/2024    ALT 23 12/15/2024    PTT 30.7 12/04/2024    INR 1.17 12/04/2024    PTP 15.6 (H) 12/04/2024    TSH 2.085 12/07/2024    NATHALIE 99 11/25/2024    LIP 28 11/25/2024    DDIMER 0.42 12/08/2019    ESRML 15 06/05/2021    MG 2.1 12/18/2024    PHOS 3.7 12/18/2024    TROP <0.045 12/08/2019     (H) 09/23/2021    B12 1,156 (H) 07/23/2018         Imaging:  XR ABDOMEN (1 VIEW) (CPT=74018)    Result Date: 12/18/2024  CONCLUSION:  1. Mild residual small bowel dilatation left mid abdomen measuring 4.1 cm has diminished.  Findings may reflect nonspecific small-bowel enteropathy, small-bowel ileus versus less likely a small bowel obstruction.    Dictated by (CST): Woodrow Fischer MD on 12/18/2024 at 1:26 PM     Finalized by (CST): Woodrow Fischer MD on 12/18/2024 at 1:29 PM          XR CHEST AP PORTABLE  (CPT=71045)    Result Date: 12/16/2024  CONCLUSION: Right basal pulmonary opacity suggesting pneumonia.  No significant interval change since most recent exam from 12/15/24.  Findings are worse since 12/12/24.    Dictated by (CST): Jagjit Jin,  MD on 12/16/2024 at 10:19 AM     Finalized by (CST): Jagjit Jin MD on 12/16/2024 at 10:20 AM          XR ABDOMEN (1 VIEW) (CPT=74018)    Result Date: 12/16/2024  CONCLUSION: Nonspecific-nonobstructive bowel gas pattern.  Dilated bowel loops demonstrated on preceding exam is not redemonstrated.    Dictated by (CST): Jagjit Jin MD on 12/16/2024 at 10:16 AM     Finalized by (CST): Jagjit Jin MD on 12/16/2024 at 10:18 AM           Vital Signs:   Blood pressure 121/82, pulse 81, temperature 98.6 °F (37 °C), temperature source Temporal, resp. rate 18, height 65\", weight 129.9 kg (286 lb 6 oz), SpO2 99%, not currently breastfeeding.    Mental Status Exam:   Appearance: Stated age female, in hospital gown, laying down in hospital bed.  Patient with tracheostomy.  Psychomotor: Slow psychomotor function.  Orientation: Alert and oriented to person, location and date with somewhat understanding of her condition.  Gait: Not evaluated.  Attitude/Coorperation: Patient presents cooperative and attentive.  Behavior: No episode of agitation reported.  Patient sleeping most of the time.  Speech: Soft low-tone speech.  Mood: Patient today admitted that she has been feeling depressed and anxious and became tearful.  Affect: Anxious and dysphoric affect congruent with the mood with occasional tearfulness.  Thought process: Linear thought process today  Thought content: Patient who has been expressing her hopelessness denied suicidal ideation or intention today.  Perceptions: Patient denied any auditory or visual hallucination.  Concentration: Grossly impaired  Memory: Grossly impaired  Intellect: Average.  Judgment and Insight: Questionable.     Impression:   Delirium due to another medical condition.  Episodic mood disorder  Agitation    Dissection of ascending aorta (HCC)    NAIMA (acute kidney injury) (HCC)    Stage 3 chronic kidney disease (HCC)    Acute respiratory failure with hypoxia (HCC)    Hypervolemia    Ileus (HCC)       Patient is a 59 year old -American, single female with past medical history of CKD, hypertension, high cholesterol who was admitted to the hospital for aortic dissection. The patient has been demonstrating alternation in mood and cognition with episodes of increased restlessness and agitation. According to team, they are trying to wean patient of propofol and precedex.     12/13/2024: patient remains on precedex. Continues to have episodes of increased restlessness and agitation. Haldol given this morning.     12/14/2024: The patient has been suffering from severe anxiety, confusion, challenging medical condition and delirious episode caused more paranoia and agitation.  Presents is reasonable approach but stabilizing the mood with antipsychotic medication in the appropriate approach at this point.    12/15/2024: The patient has been demonstrating improvement to the addition of Haldol with decrease in the Precedex.    12/16/2024: The patient continue demonstrating slight improvement with no agitation, no hallucination but some anxiety and slight confusion about her medical condition.  Precedex is 0.4 can be lowered gradually.    12/17/2024: The patient has been demonstrating improvement to her delirious episode and has not been on Precedex, Haldol and did not indicate any medication as needed or fentanyl.  Continue current management and sign off.    12/18/2024: The patient who was in delirious state has been demonstrating improvement in her cognition and orientation and understanding to her medical condition.  Patient has been sinking into a depressed and anxious state with some hopelessness and helplessness.    Discussed risk and benefit, acknowledging the current symptom and severity.  At this point, I would recommend the following approach:     Focus on safety  Focus on education and support.  Focus on insight orientation helping the patient understand diagnosis and treatment plan.  Continue Ativan 0.5  mg IV every 6 hours scheduled.  Start Lexapro 5 mg nightly.  Utilize ativan 1 mg IV q 4 hours PRN for anxiety  Processed with patient at length, the initiation of the above psychotropic medications I advised the patient of the risks, benefits, alternatives and potential side effects. The patient consents to administration of the medications and understands the right to refuse medications at any time. The patient verbalized understanding.   Coordinate plan with team    Orders This Visit:  Orders Placed This Encounter   Procedures    Troponin I (High Sensitivity)    CBC With Differential With Platelet    Basic Metabolic Panel (8)    Hepatic Function Panel (7)    PTT, Activated    Prothrombin Time (PT)    Pro Beta Natriuretic Peptide    Open heart surgical profile    CBC, Platelet; No Differential    CBC With Differential With Platelet    Prothrombin Time (PT)    PTT, Activated    Fibrinogen Activity    Expanded Arterial Blood Gas    Expanded Arterial Blood Gas    Arterial blood gas    CBC, Platelet; No Differential    Basic Metabolic Panel (8)    Magnesium    Comp Metabolic Panel (14)    Magnesium    Expanded Arterial Blood Gas    Arterial blood gas    CBC, Platelet; No Differential    Magnesium    Renal Function Panel    Urinalysis, Routine    Microalb/Creat Ratio, Random Urine    Comp Metabolic Panel (14)    Magnesium    CBC With Differential With Platelet    Phosphorus    CBC, Platelet; No Differential    Heparin Induced Platelet    Expanded Arterial Blood Gas    Magnesium    Renal Function Panel    Lipase    Amylase    Lactic Acid, Plasma    Basic Metabolic Panel (8)    Potassium    Lipid Panel    Basic Metabolic Panel (8)    CBC, Platelet; No Differential    CBC, Platelet; No Differential    Magnesium    Renal Function Panel    CBC, Platelet; No Differential    Triglycerides    Magnesium    CBC With Differential With Platelet    Renal Function Panel    Manual differential    CBC With Differential With Platelet     Procalcitonin    Manual differential    CBC With Differential With Platelet    Renal Function Panel    Magnesium    Manual differential    REDRAW RENAL    Magnesium    CBC With Differential With Platelet    Comp Metabolic Panel (14)    Iron And Tibc    Phosphorus    Manual differential    Expanded Arterial Blood Gas    Magnesium    CBC With Differential With Platelet    Renal Function Panel    PTT, Activated    Prothrombin Time (PT)    Manual differential    CBC, Platelet; No Differential    Comp Metabolic Panel (14)    Lipid Panel    Expanded Arterial Blood Gas    Basic Metabolic Panel (8)    CBC, Platelet; No Differential    Magnesium    Renal Function Panel    CBC With Differential With Platelet    Magnesium    Renal Function Panel    CBC With Differential With Platelet    Potassium    TSH W Reflex To Free T4    Magnesium    CBC With Differential With Platelet    Renal Function Panel    Scan slide    Magnesium    Phosphorus    CBC With Differential With Platelet    Comp Metabolic Panel (14)    REDRAW CMP (P)    Basic Metabolic Panel (8)    CBC, Platelet; No Differential    Basic Metabolic Panel (8)    CBC, Platelet; No Differential    Basic Metabolic Panel (8)    REDRAW BMP    Potassium    Basic Metabolic Panel (8)    CBC With Differential With Platelet    Scan slide    Potassium    RBC Morphology Scan    Magnesium    Phosphorus    Basic Metabolic Panel (8)    Comp Metabolic Panel (14)    Magnesium    Phosphorus    Triglycerides    Triglycerides    Potassium    CBC, Platelet; No Differential    CBC, Platelet; No Differential    Potassium    Vancomycin Peak, Serum    CBC, Platelet; No Differential    Comp Metabolic Panel (14)    Hemoglobin & Hematocrit    CBC, Platelet; No Differential    Magnesium    Phosphorus    Basic Metabolic Panel (8)    Magnesium    Phosphorus    CBC With Differential With Platelet    Manual differential    Magnesium    CBC With Differential With Platelet    Basic Metabolic Panel (8)     Phosphorus    Manual differential    Basic Metabolic Panel (8)    Magnesium    Phosphorus    CBC With Differential With Platelet    Basic Metabolic Panel (8)    Type and screen    ABORH (Blood Type)    Antibody Screen    ABORH Confirmation    Prepare platelets Once    Prepare fresh frozen plasma Once    Prepare RBC STAT    Prepare cryoprecipitate Once    Type and screen    Prepare RBC Once    ABORH (Blood Type)    Antibody Screen    Type and screen    Prepare RBC STAT    ABORH (Blood Type)    Antibody Screen    Type and screen    Prepare RBC STAT    ABORH (Blood Type)    Antibody Screen    Specimen to Pathology Tissue    Rapid SARS-CoV-2 by PCR    MRSA Screen by PCR    Sputum culture    Sputum culture       Meds This Visit:  Requested Prescriptions      No prescriptions requested or ordered in this encounter         Armen Dudley MD  12/18/2024    Note to Patient: The 21st Century Cures Act makes medical notes like these available to patients in the interest of transparency. However, be advised this is a medical document. It is intended as peer to peer communication. It is written in medical language and may contain abbreviations or verbiage that are unfamiliar. It may appear blunt or direct. Medical documents are intended to carry relevant information, facts as evident, and the clinical opinion of the practitioner. This note may have been transcribed using a voice dictation system. Voice recognition errors may occur. This should not be taken to alter the content or meaning of this note.           [1]   Allergies  Allergen Reactions    Atorvastatin MYALGIA    Pravastatin MYALGIA    Rosuvastatin MYALGIA    Seasonal Runny nose

## 2024-12-19 ENCOUNTER — APPOINTMENT (OUTPATIENT)
Dept: GENERAL RADIOLOGY | Facility: HOSPITAL | Age: 59
DRG: 003 | End: 2024-12-19
Attending: NURSE PRACTITIONER
Payer: COMMERCIAL

## 2024-12-19 ENCOUNTER — APPOINTMENT (OUTPATIENT)
Dept: GENERAL RADIOLOGY | Facility: HOSPITAL | Age: 59
End: 2024-12-19
Attending: NURSE PRACTITIONER
Payer: COMMERCIAL

## 2024-12-19 PROBLEM — J96.01 ACUTE HYPOXIC RESPIRATORY FAILURE (HCC): Status: ACTIVE | Noted: 2024-11-27

## 2024-12-19 LAB
ANION GAP SERPL CALC-SCNC: 7 MMOL/L (ref 0–18)
ATRIAL RATE: 80 BPM
BASOPHILS # BLD AUTO: 0.03 X10(3) UL (ref 0–0.2)
BASOPHILS NFR BLD AUTO: 0.3 %
BUN BLD-MCNC: 21 MG/DL (ref 9–23)
BUN/CREAT SERPL: 16.8 (ref 10–20)
CALCIUM BLD-MCNC: 9 MG/DL (ref 8.7–10.4)
CHLORIDE SERPL-SCNC: 106 MMOL/L (ref 98–112)
CO2 SERPL-SCNC: 27 MMOL/L (ref 21–32)
CREAT BLD-MCNC: 1.25 MG/DL
DEPRECATED RDW RBC AUTO: 58.1 FL (ref 35.1–46.3)
EGFRCR SERPLBLD CKD-EPI 2021: 49 ML/MIN/1.73M2 (ref 60–?)
EOSINOPHIL # BLD AUTO: 0.34 X10(3) UL (ref 0–0.7)
EOSINOPHIL NFR BLD AUTO: 3.3 %
ERYTHROCYTE [DISTWIDTH] IN BLOOD BY AUTOMATED COUNT: 19.1 % (ref 11–15)
GLUCOSE BLD-MCNC: 139 MG/DL (ref 70–99)
GLUCOSE BLDC GLUCOMTR-MCNC: 132 MG/DL (ref 70–99)
GLUCOSE BLDC GLUCOMTR-MCNC: 136 MG/DL (ref 70–99)
GLUCOSE BLDC GLUCOMTR-MCNC: 154 MG/DL (ref 70–99)
GLUCOSE BLDC GLUCOMTR-MCNC: 161 MG/DL (ref 70–99)
HCT VFR BLD AUTO: 25.2 %
HGB BLD-MCNC: 8 G/DL
IMM GRANULOCYTES # BLD AUTO: 0.43 X10(3) UL (ref 0–1)
IMM GRANULOCYTES NFR BLD: 4.1 %
LYMPHOCYTES # BLD AUTO: 1.13 X10(3) UL (ref 1–4)
LYMPHOCYTES NFR BLD AUTO: 10.9 %
MAGNESIUM SERPL-MCNC: 2.2 MG/DL (ref 1.6–2.6)
MCH RBC QN AUTO: 26.7 PG (ref 26–34)
MCHC RBC AUTO-ENTMCNC: 31.7 G/DL (ref 31–37)
MCV RBC AUTO: 84 FL
MONOCYTES # BLD AUTO: 0.87 X10(3) UL (ref 0.1–1)
MONOCYTES NFR BLD AUTO: 8.4 %
NEUTROPHILS # BLD AUTO: 7.58 X10 (3) UL (ref 1.5–7.7)
NEUTROPHILS # BLD AUTO: 7.58 X10(3) UL (ref 1.5–7.7)
NEUTROPHILS NFR BLD AUTO: 73 %
OSMOLALITY SERPL CALC.SUM OF ELEC: 295 MOSM/KG (ref 275–295)
P AXIS: 74 DEGREES
P-R INTERVAL: 186 MS
PHOSPHATE SERPL-MCNC: 4.9 MG/DL (ref 2.4–5.1)
PLATELET # BLD AUTO: 203 10(3)UL (ref 150–450)
POTASSIUM SERPL-SCNC: 4.1 MMOL/L (ref 3.5–5.1)
Q-T INTERVAL: 376 MS
QRS DURATION: 84 MS
QTC CALCULATION (BEZET): 433 MS
R AXIS: 36 DEGREES
RBC # BLD AUTO: 3 X10(6)UL
SODIUM SERPL-SCNC: 140 MMOL/L (ref 136–145)
T AXIS: 32 DEGREES
VENTRICULAR RATE: 80 BPM
WBC # BLD AUTO: 10.4 X10(3) UL (ref 4–11)

## 2024-12-19 PROCEDURE — 99233 SBSQ HOSP IP/OBS HIGH 50: CPT | Performed by: INTERNAL MEDICINE

## 2024-12-19 PROCEDURE — 99232 SBSQ HOSP IP/OBS MODERATE 35: CPT | Performed by: OTHER

## 2024-12-19 PROCEDURE — 71045 X-RAY EXAM CHEST 1 VIEW: CPT | Performed by: NURSE PRACTITIONER

## 2024-12-19 RX ORDER — ONDANSETRON 2 MG/ML
INJECTION INTRAMUSCULAR; INTRAVENOUS
Status: COMPLETED
Start: 2024-12-19 | End: 2024-12-19

## 2024-12-19 RX ORDER — ONDANSETRON 2 MG/ML
4 INJECTION INTRAMUSCULAR; INTRAVENOUS EVERY 6 HOURS PRN
Status: DISCONTINUED | OUTPATIENT
Start: 2024-12-19 | End: 2024-12-23

## 2024-12-19 RX ORDER — MIRTAZAPINE 7.5 MG/1
7.5 TABLET, FILM COATED ORAL NIGHTLY
Status: DISCONTINUED | OUTPATIENT
Start: 2024-12-19 | End: 2024-12-23

## 2024-12-19 NOTE — PAYOR COMM NOTE
--------------  12/19 CONTINUED STAY REVIEW    Payor: [employer]/HMO/POS/EPO  Subscriber #:  [id number]  Authorization Number: [id number]    REMAINS IN ICU    PULM:    Afebrile on vent.  Nicardipine stopped last night.  Getting oral meds via PEG and tolerating.       Scheduled Medications    escitalopram  5 mg Oral Nightly    metoclopramide  10 mg Intravenous Q6H    pantoprazole  40 mg Intravenous Daily    furosemide  40 mg Intravenous BID (Diuretic)    LORazepam  0.5 mg Intravenous QID    cloNIDine  1 patch Transdermal Weekly    piperacillin-tazobactam  3.375 g Intravenous Q8H    carvedilol  37.5 mg Oral BID with meals    aspirin  81 mg Peg Tube Daily    isosorbide dinitrate  40 mg Per NG Tube TID (Nitrates)    hydrALAZINE  150 mg Oral Q8H GABRIEL    amLODIPine  10 mg Oral Daily    heparin  5,000 Units Subcutaneous Q8H GABRIEL    chlorhexidine gluconate  15 mL Mouth/Throat BID         Continuous Infusing Medication:  Medication Infusions    adult 3 in 1 TPN 83.3 mL/hr at 12/18/24 2125    dextrose 10%      labetalol (Trandate) 400 mg in sodium chloride 0.9% 200 mL infusion 1 mg/min (12/18/24 2156)    dextrose 10%      dexmedetomidine Stopped (12/17/24 0600)    niCARdipine Stopped (12/18/24 1800)           PHYSICAL EXAM:  /61 (BP Location: Left arm)   Pulse 79   Temp 98.1 °F (36.7 °C) (Temporal)   Resp 18   Ht 5' 5\" (1.651 m)   Wt 286 lb 6 oz (129.9 kg)   SpO2 98%   BMI 47.66 kg/m²   Vent Mode: PRVC/AC  FiO2 (%):  [30 %] 30 %  S RR:  [18] 18  S VT:  [550 mL-630 mL] 550 mL  PEEP/CPAP (cm H2O):  [5 cm H20] 5 cm H20  MAP (cm H2O):  [10-13] 12  CONSTITUTIONAL: intubated awake   HEENT: atraumatic normocephalic  MOUTH: MMM  NECK/THROAT: no JVD. Trachea midline. No obvious thyromegaly. + trach with min bleeding  LUNG: clearer BS b/l no wheezing, + crackles. Diminished at bases. Chest symmetric with respiratory motion. + midsternal surgical wound healing   HEART: regular rate and rhythm, no obvious murmers  or gallops noted  ABD: soft non distended not tender. + bowel sounds. No organomegaly noted. + PEG tube to LIS  EXT: no clubbing, cyanosis, or edema noted. Pulses intact grossly      KUB 12/18/24  1. Mild residual small bowel dilatation left mid abdomen measuring 4.1 cm has diminished.  Findings may reflect nonspecific small-bowel enteropathy, small-bowel ileus versus less likely a small bowel obstruction.       ASSESSMENT/PLAN:  Type A aortic dissection s/p repair now intubated in ICU  -on labetalol gtt. Nicardipine stopped 12/18/24  -multiple oral antiHTN meds for BP now starting to be given since GI recommended start trickle feeds  -s/p pRBC transfusion 12/14/24 for anemia. No obvious s/s bleeding     Post op acute respiratory failure  -complicated by extubation and reimergent intubation and significant emesis concerning for aspiration PNA vs pneumonitis  -started on zosyn-completed initial 10 day course  -checked ETT asp-candida likely contaminant  -failed multiple SBTs d/t increased RR, work of breathing, hypertension, hypoxemia, significant thick secretions  -remains MV dependent   -hx of likely TANYA and previous smoking hx. Has sign dense consolidations and atelectasis on chest CT  -s/p trach by ENT on 12/4/24 and PEG by GI on 12/5/24  -started trach/vent weaning as able-limited by HTN and  GI issues with recurrent emesis and persistent ileus  -PRN ABG and CXR  -saline nebs  -weaned off precedex  -psych consult for assistance with sedation and agitation-defer to their recs  -on scheduled clonidine patch in attempt to wean off precedex  -restarted on zosyn for recurrent aspiration   -daily SBT and attempt to wean from the vent      Previous hx of smoking and ETOH  -continue duonebs PRN     NAIMA on CKD and metabolic acidosis  -likely from surgery, and acute issues  -monitor UO and renal labs  -renal following   -diuresis as per cardiology and renal     Abd pain  -s/p abd CT as above  -not tolerating TF now  -PEG  tube to LIS  -recurrent aspiration   -GI following-recommend trickle feeds     Proph:  -DVT: hep sq     Dispo:  -full code  -SW/ to assist with LTAC placement      CV SURGERY:  Patient seen and examined  No acute events  Labs reviewed     Plan:  PEG for meds is okay  Wean IV meds as able  Continue TPN  Overall goals are for improved gut motility so we can use the G-tube for meds and feeding, to wean the TPN, wean the IV antihypertensives and discharged to LTAC.     CARDS:     Presented with acute onset CP   -CT with type A aortic dissection above right coronary artery and extending distally into the left common iliac artery and external iliac   -s/p emergent successful repair on 11/22/24  -course complicated by failure to extubated resulting in Trach/PEG tube placement and multiple aspiration events associated with ileus               -CXR on 12/16 with likely aspiration PNA              -started on scheduled Reglan per GI     Hypertension  -TTE on 11/23 with hyperdynamic LVEF  -continue BP management wean off labetalol today and now resumed coreg f37.5 mg twice daily, clonidine  0.3 mg 3 times daily, combination hydralazine + isosorbide 150mg-40mg TID, amlodipine 10 mg daily     Volume overload  HFpEF  More euvolemic on exam further diuretics as per nephrology.  On Lasix 40 mg twice daily.

## 2024-12-19 NOTE — PLAN OF CARE
Problem: RESPIRATORY - ADULT  Goal: Achieves optimal ventilation and oxygenation  Description: INTERVENTIONS:  - Assess for changes in respiratory status  - Assess for changes in mentation and behavior  - Position to facilitate oxygenation and minimize respiratory effort  - Oxygen supplementation based on oxygen saturation or ABGs  - Provide Smoking Cessation handout, if applicable  - Encourage broncho-pulmonary hygiene including cough, deep breathe, Incentive Spirometry  - Assess the need for suctioning and perform as needed  - Assess and instruct to report SOB or any respiratory difficulty  - Respiratory Therapy support as indicated  - Manage/alleviate anxiety  - Monitor for signs/symptoms of CO2 retention  Outcome: Progressing   Patient received with tracheostomy and on ventilator PRVC 18/550/+5/30%. Bilateral breath sounds auscultated. Suction provided when indicated. Tracheostomy care provided. SBT initiated at 1035, see documentation below. No acute events during the daytime. RT will continue to monitor.      PS Trial: PASS/FAIL? (Pass)  Start time:  1150  Settings:  Pressure Support: 5    CPAP: 5    FiO2:  30  Reason for Failure if present:   N/A      Weaning Parameters:  RR: 15  RSBI: 26  NIF: -  VT: 580  VC: -     Returned to Full support: (Time:1235)  MD notified: (Physician: Redd)  Current Ventilator Settings:   - Mode: PRVC   - Rate: 18   - Tidal Volume:550   - FiO2: 30   - PEEP +5

## 2024-12-19 NOTE — PROGRESS NOTES
Progress Note     Alisha Wilde Patient Status:  Inpatient    10/25/1965 MRN [id number]   Location [facility] 2W/SW Attending Jessika Jimenes MD   Hosp Day # 27 PCP Bridger Avendano MD     Chief Complaint: patient presented with   Chief Complaint   Patient presents with    Chest Pain Angina       Subjective:   Pt seen and examined. Noted nausea, emesis and GI recs.     Review of Systems:   10 point ROS completed and was negative, except for pertinent positive and negatives stated in subjective.    Objective:   Vital signs:  Temp:  [97.9 °F (36.6 °C)-98.7 °F (37.1 °C)] 98 °F (36.7 °C)  Pulse:  [71-85] 83  Resp:  [15-24] 19  BP: (114-151)/(56-73) 128/70  SpO2:  [97 %-100 %] 100 %  FiO2 (%):  [30 %] 30 %    Wt Readings from Last 6 Encounters:   24 285 lb 8 oz (129.5 kg)   10/24/24 254 lb (115.2 kg)   24 249 lb (112.9 kg)   24 249 lb (112.9 kg)   23 249 lb (112.9 kg)   10/09/23 248 lb (112.5 kg)         Physical Exam:    General: No acute distress. , Tracheostomy in place,     , morbidly obese  Respiratory: Clear to auscultation bilaterally. No wheezes. No rhonchi.  Cardiovascular: S1, S2. Regular rate and rhythm. No murmurs, rubs or gallops.   Abdomen: Soft, nontender, nondistended.  Positive bowel sounds. No rebound or guarding. PEG  Neurologic: No focal neurological deficits.   Musculoskeletal: Moves all extremities.  Extremities: No edema.    Results:   Diagnostic Data:      Labs:    Labs Last 24 Hours:   BMP     CBC    Other     Na 140 Cl 106 BUN 21 Glu 139   Hb 8.0   PTT - Procal -   K 4.1 CO2 27.0 Cr 1.25   WBC 10.4 >< .0  INR - CRP -   Renal Lytes Endo    Hct 25.2   Trop - D dim -   eGFR - Ca 9.0 POC Gluc  154    LFT   pBNP - Lactic -   eGFR AA - PO4 4.9 A1c -   AST - APk - Prot -  LDL -     Mg 2.2 TSH -   ALT - T miranda - Alb -        COVID-19 Lab Results    COVID-19  Lab Results   Component Value Date    COVID19 Not Detected 2024    COVID19 Not Detected 08/10/2023     COVID19 Not Detected 05/13/2022       Pro-Calcitonin  No results for input(s): \"PCT\" in the last 168 hours.    Cardiac  No results for input(s): \"TROP\", \"PBNP\" in the last 168 hours.    Creatinine Kinase  No results for input(s): \"CK\" in the last 168 hours.    Inflammatory Markers  No results for input(s): \"CRP\", \"NATALIA\", \"LDH\", \"DDIMER\" in the last 168 hours.    Imaging: Imaging data reviewed in Epic.    IMAGES:   CT A/P 12/10/24  CONCLUSION:   1. Fluid-filled loops of small bowel, which could reflect underlying infectious/inflammatory enteritis or malabsorption in the appropriate clinical setting. Bowel loops are mildly dilated without clear transition point to suggest mechanical bowel   obstruction, although early or partial bowel obstruction could have a similar appearance.   2. Extensive distal colonic diverticulosis without CT evidence of acute complication.    3. Bladder distension despite Webb catheter placement.   4. Hepatomegaly with hepatic steatosis.   5. Diffuse anasarca.   6. Partially visualized postthoracotomy chest with possible postoperative seroma/hematoma of the anterior chest wall.   7. Bilateral pleural effusions and associated basilar atelectasis, with or without superimposed pneumonia.   8. Additional scattered patchy nodular opacities may reflect infectious process.    9. Low-density appearance of the intracardiac blood pool raises the possibility of underlying anemia. Correlate with hematologic parameters.   10. Lesser incidental findings as above.      CXR 12/9/24  CONCLUSION:   1. Cardiomegaly.  Atherosclerotic aneurysmal thoracic aorta   2. Tracheostomy tube overlies tracheal air column.   3.  Bilateral mixed alveolar and interstitial multifocal airspace opacification has progressed.        KUB 12/9/24  CONCLUSION:   No huang dilatation of the small bowel to suggest small bowel obstruction.   Large cecal stool burden which may indicate constipation.  Mild stool burden throughout the  remainder of the colon.      CXR 12/5/24  No significant change when compared to 12/04/2024.      CXR 12/4/24  CONCLUSION:   1. Mild cardiomegaly and minimally prominent pulmonary vascularity but no huang pulmonary edema.  ET tube and NG tubes have been removed.  Tracheostomy is now noted in the trachea extending to the level the clavicles.  No pneumothorax.   2. Persistent patchy bilateral upper lobe airspace disease right more than left suggesting persistent bilateral upper lobe pneumonia.  Correlate clinically and continued follow-up is advised.      CXR 12/2/24  FINDINGS:   POSITION: The patient is semi-erect and slightly rotated to the right.   DEVICES: There is an endotracheal tube terminating approximately 3.9 cm above the jennyfer.  A large-bore right internal jugular central venous vascular access sheath has tip projecting in the SVC. An enteric tube extends caudally beyond the field of view.   CARDIAC/VASC: The cardiomediastinal silhouette is accentuated by AP technique, but likely stably enlarged. There is mild tortuosity of the thoracic aorta with peripheral atherosclerotic vascular calcification. The pulmonary vascularity is within normal   limits.   MEDIAST/HAMLET: Surgical clips are present.   LUNGS/PLEURA: Elevation right hemidiaphragm is noted. Multifocal patchy airspace consolidation is demonstrated, slightly worse on the right side than left. Mild interstitial opacities are seen. Additional scattered reticular opacities may be atelectatic   in nature. No large pleural effusion or pneumothorax is evident.    BONES: Median sternotomy wires are demonstrated. Mild degenerative changes of the thoracic spine are apparent. There is no fracture or visible bony lesion.   OTHER: There may be surgical clips at the level of the thoracic inlet. Leads overlie the chest and obscure underlying detail      CXR 11/27/24  Moderate pulmonary edema, progressed since 11/26/2024.      CT c/a/p  CONCLUSION:  Low-lying ETT,  0.8 cm above the jennyfer   Multifocal bilateral pulmonary nodular consolidation s     CXR 11/24/24  FINDINGS:   CARDIAC/VASC: The cardiac silhouette is exaggerated by AP portable technique. There is stable central pulmonary venous congestion.   MEDIAST/HAMLET:   No visible mass or adenopathy.   LUNGS/PLEURA: There are stable streaky opacities at the right lung base.  No pleural effusion or pneumothorax.   BONES: Sternotomy changes are again noted.   OTHER: An endotracheal tube tip projects 3.8 cm above the jennyfer.  An enteric tube again courses into the left upper quadrant.  A right internal jugular approach Brazil-Dev catheter tip again projects over the pulmonary outflow tract.  A mediastinal drain tip again projects over the right suprahilar region.      CXR 11/23/24  On my read possible RML infiltrates  CONCLUSION: Post emergent acute type A aortic dissection repair.  Stable cardiomegaly.  Gross stable/satisfactory position of lines and tubes.  Mild pulmonary vascular congestion.       CXR 11/22/24  CONCLUSION: Post feeding tube placement with tip in the distal esophagus approximately 4 cm above the gastroesophageal junction.  Recommend advancement of the tube by approximately 10 cm to place it in the stomach.      CXR 11/22/24  CARDIAC/MEDIASTINUM: The cardiac silhouette is enlarged and unchanged.. There is a right internal jugular Brazil-Dev catheter with tip at the level of the main pulmonary artery. There is a right-sided chest tube. Endotracheal tube with tip approximately    5.3 cm above the jennyfer. There is a nasogastric tube with tip and sidehole within the stomach and below the diaphragm. There are median sternotomy wires and postoperative changes of ascending aortic repair.   LUNGS: There is pulmonary vascular redistribution without edema. Minimal blunting of the bilateral costophrenic angles may be secondary to trace pleural effusions versus chronic pleural thickening. There is mild bibasilar atelectasis.  No pneumothorax.   BONES: There is degenerative disease of the thoracic spine.      Chest CTA 11/22/24  CONCLUSION:   1. Type a aortic dissection beginning just above right renal (correction:  Right coronary)  artery and extending distally into the left common iliac artery and external iliac.  Findings were called to Dr. Echavarria at 5:52 p.m.    KUB 12/15  CONCLUSION:   Significantly limited examination.      Ongoing dilation of small bowel loops up to 4.7 cm, previously 4.5 cm.  Recommend continued radiographic/clinical follow-up.     Medications:    pantoprazole  40 mg Intravenous Q12H    escitalopram  5 mg Oral Nightly    metoclopramide  10 mg Intravenous Q6H    furosemide  40 mg Intravenous BID (Diuretic)    LORazepam  0.5 mg Intravenous QID    cloNIDine  1 patch Transdermal Weekly    piperacillin-tazobactam  3.375 g Intravenous Q8H    carvedilol  37.5 mg Oral BID with meals    aspirin  81 mg Peg Tube Daily    isosorbide dinitrate  40 mg Per NG Tube TID (Nitrates)    hydrALAZINE  150 mg Oral Q8H GABRIEL    amLODIPine  10 mg Oral Daily    heparin  5,000 Units Subcutaneous Q8H GABRIEL    chlorhexidine gluconate  15 mL Mouth/Throat BID       Assessment & Plan:   ASSESSMENT / PLAN:     Problem List Items Addressed This Visit          HCC Problems    Dissection of ascending aorta (HCC) - Primary    Relevant Medications    sodium chloride 0.9% infusion 1,000 mL (Completed)    prothrombin complex conc (human) (Kcentra) 1,563 Units in 60 mL infusion (Completed)    furosemide (Lasix) 10 mg/mL injection 20 mg (Completed)    heparin (Porcine) 5000 UNIT/ML injection 5,000 Units    amLODIPine (Norvasc) tab 10 mg    amLODIPine (Norvasc) tab 5 mg (Completed)    sodium chloride 0.9% infusion (Completed)    hydrALAZINE (Apresoline) tab 50 mg (Completed)    furosemide (Lasix) 10 mg/mL injection 40 mg (Completed)    labetalol (Trandate) 5 mg/mL injection (Completed)    hydrALAZINE (Apresoline) tab 150 mg    isosorbide dinitrate (Isordil)  tab 40 mg    sodium chloride 0.9% infusion (Completed)    furosemide (Lasix) 10 mg/mL injection 40 mg (Completed)    niCARdipine (carDENE) 125 mg in sodium chloride 0.9% 250 mL infusion    furosemide (Lasix) 10 mg/mL injection 40 mg (Completed)    cloNIDine (Catapres) tab 0.3 mg (Completed)    cloNIDine (Catapres) tab 0.3 mg (Completed)    aspirin chewable tab 81 mg    carvedilol (Coreg) tab 37.5 mg    hydrALAzine (Apresoline) 20 mg/mL injection 10 mg    dexmedeTOMIDine (Precedex) 800 mcg in sodium chloride 0.9% 100 mL infusion    furosemide (Lasix) 10 mg/mL injection 40 mg (Completed)    cloNIDine (Catapres) 0.3 MG/24HR patch 1 patch    furosemide (Lasix) 10 mg/mL injection 40 mg (Completed)    furosemide (Lasix) 10 mg/mL injection 40 mg (Completed)    labetalol (Trandate) 400 mg in sodium chloride 0.9% 200 mL infusion    sodium chloride 0.9% infusion (Completed)    furosemide (Lasix) 10 mg/mL injection 40 mg    pantoprazole (Protonix) 40 mg in sodium chloride 0.9% PF 10 mL IV push (Start on 12/19/2024  9:30 PM)     Other Visit Diagnoses       Aortic anomaly (HCC)        Relevant Medications    atropine 0.1 MG/ML injection 1 mg (Completed)    magnesium sulfate in dextrose 5% 1 g/100mL infusion premix 1 g    magnesium sulfate in sterile water for injection 2 g/50mL IVPB premix 2 g    furosemide (Lasix) 10 mg/mL injection 20 mg (Completed)    heparin (Porcine) 5000 UNIT/ML injection 5,000 Units    amLODIPine (Norvasc) tab 10 mg    amLODIPine (Norvasc) tab 5 mg (Completed)    hydrALAZINE (Apresoline) tab 50 mg (Completed)    furosemide (Lasix) 10 mg/mL injection 40 mg (Completed)    labetalol (Trandate) 5 mg/mL injection (Completed)    hydrALAZINE (Apresoline) tab 150 mg    lidocaine PF (Xylocaine-MPF) 1% injection (Completed)    isosorbide dinitrate (Isordil) tab 40 mg    furosemide (Lasix) 10 mg/mL injection 40 mg (Completed)    niCARdipine (carDENE) 125 mg in sodium chloride 0.9% 250 mL infusion    ceFAZolin  (Ancef) 3 g in sodium chloride 0.9% 100mL IVPB premix (Completed)    furosemide (Lasix) 10 mg/mL injection 40 mg (Completed)    cloNIDine (Catapres) tab 0.3 mg (Completed)    cloNIDine (Catapres) tab 0.3 mg (Completed)    aspirin chewable tab 81 mg    carvedilol (Coreg) tab 37.5 mg    hydrALAzine (Apresoline) 20 mg/mL injection 10 mg    furosemide (Lasix) 10 mg/mL injection 40 mg (Completed)    lidocaine (cardiac) (Xylocaine) 20 mg/mL injection (Completed)    atropine 0.1 MG/ML injection (Completed)    EPINEPHrine (Adrenalin) 1 MG/10ML (0.1 MG/ML) injection (Cardiac Arrest) (Completed)    cloNIDine (Catapres) 0.3 MG/24HR patch 1 patch    furosemide (Lasix) 10 mg/mL injection 40 mg (Completed)    furosemide (Lasix) 10 mg/mL injection 40 mg (Completed)    labetalol (Trandate) 400 mg in sodium chloride 0.9% 200 mL infusion    furosemide (Lasix) 10 mg/mL injection 40 mg    magnesium sulfate in sterile water for injection 2 g/50mL IVPB premix 2 g (Completed)    adult 3 in 1 TPN    adult 3 in 1 TPN (Start on 12/19/2024  9:00 PM)    Other Relevant Orders    Specimen to Pathology Tissue (Completed)            58 y/o Morbid obesity, hypertension, gastroesophageal reflux disease, chronic kidney disease stage 2 to 3, and hyperlipidemia admitted on 11/21 for Chest pain -CT CT angiogram of the chest, abdomen, and pelvis rule out dissection showed type A aortic dissection s/p emergent repair 11/22/24 , transferred to ICU post op intubated on 11/22, failed SBT, s/p trach 12/4,  PEG 12/5, completed 10 days of abx zsecondary to aspiration event , no with ileus and Abx Vanc and Zosyn resumed for aspiration pneumonia           Ileus   -Gen surgery and GI following   -rpt Abd xray results pending   -PEG tube to LIS   -possibly will need NG   -Continue IV abx   12/13-PEG tube drainage improved   12/14: Repeat KUB this am   12/15: KUB yesterday with on going  Small bowel Dilation         Type A aortic dissection   -s/p repair now  intubated in ICU  -on labetalol gtt. Nicardipine stopped 12/18/24  -multiple oral antiHTN meds for BP now starting to be given since GI recommended start trickle feeds  -s/p pRBC transfusion 12/14/24 for anemia. No obvious s/s bleeding  -CV surgery, cards and critical care on consult.   TTE LVEF 75-80%.     Ileus   -On IV Reglan- montior QT interval   - continue bowel regimen- miralax bid, tap water enemas- GI on board   Will order another CT scan to see if there is possible obstruction  New picc 12/2       - Plan is to eventually discharge to LTAC  PEG 12/5-  ok for meds, starting trickle feeds, keep TPN for now but wean as able per dietitian.   Weaning antihypertensives, kub improved    Acute hypoxic respiratory failure   Aspiration event   Completed 10 days of zosyn.   Sputum cx candida   Failed SBT multiple times. Plans for trach tomorrow. PEG 12/5   ENT and GI on consult.  Pulmonary on consult.   CXR with multifocal airspace dz-on zosyn per pulm for aspiration   Albuterol nebs   Status post trach placement 12/4  Status post PEG placement  12/5- resume tube feeds   Seroquel started to help with agitation 50 mg BID  -daily sbt per pulm      H/o tobacco and ETOH abuse   Duonebs PRN   CIWA protocol      NAIMA on CKD III   Volume overload  Renal on consult.   [place] to be secondary to MARY LOU/ATN   Off lasix drip now on IV BID lasix. Stable  Doppler renal ultrasound unremarkable for renal artery stenosis    Essential HTN   Coreg 25 mg BID, norvasc 10mg daily, hydralazine 150mg TID. Clonidinie patch 0.2mg TID   Add hydralazine combination with isosobide 150-40 mg TID   On lasix  Webb in place   ARB on hold due to NAIMA.   Weaning labetalol , off cardene now    Iron def anemia  IV iron.   Iron level low   Transfuse for Hb < 7.0     Other medical problems:  Morbid Obesity   TANYA       Quality:  DVT Prophylaxis: Heparin   CODE status: FULL   DISPO: pending clinical improvement.   Estimated date of discharge: To be  determined  Discharge is dependent on: Improved clinical status  At this point Patient is expected to be discharge to: LTAC    .     Coordinated care with providers and counseling re: treatment plan and workup  Plan is to go to LTAC once off TPN and tolerating feeds and off IV bp meds.     Jessika Jimenes MD    Supplementary Documentation:      MDM: High, acute illness/severe exacerbation of chronic illness posing threat to life.  IV medications requiring close inpatient monitoring

## 2024-12-19 NOTE — PLAN OF CARE
Cardiology Plan of Care Update    Notified by RN of brief run of NSVT. Goal K >=4, Mg >=2. Continue BB. BP remains stable. Continue to monitor.     Claudia Adams, KATHERINE  12/19/24   3:27 PM  [phone] [place]  [phone] [place]

## 2024-12-19 NOTE — PLAN OF CARE
Patient trach to vent; tolerating vent settings, PRN fentanyl for discomfort.  PEG ok to use for meds only per CV surgery; no episodes of emesis. Webb intact with good urine output. Remain off cardene. Labetalol continued.  TPN continued.       Problem: RESPIRATORY - ADULT  Goal: Achieves optimal ventilation and oxygenation  Description: INTERVENTIONS:  - Assess for changes in respiratory status  - Assess for changes in mentation and behavior  - Position to facilitate oxygenation and minimize respiratory effort  - Oxygen supplementation based on oxygen saturation or ABGs  - Provide Smoking Cessation handout, if applicable  - Encourage broncho-pulmonary hygiene including cough, deep breathe, Incentive Spirometry  - Assess the need for suctioning and perform as needed  - Assess and instruct to report SOB or any respiratory difficulty  - Respiratory Therapy support as indicated  - Manage/alleviate anxiety  - Monitor for signs/symptoms of CO2 retention  Outcome: Progressing     Problem: CARDIOVASCULAR - ADULT  Goal: Maintains optimal cardiac output and hemodynamic stability  Description: INTERVENTIONS:  - Monitor vital signs, rhythm, and trends  - Monitor for bleeding, hypotension and signs of decreased cardiac output  - Evaluate effectiveness of vasoactive medications to optimize hemodynamic stability  - Monitor arterial and/or venous puncture sites for bleeding and/or hematoma  - Assess quality of pulses, skin color and temperature  - Assess for signs of decreased coronary artery perfusion - ex. Angina  - Evaluate fluid balance, assess for edema, trend weights  Outcome: Progressing  Goal: Absence of cardiac arrhythmias or at baseline  Description: INTERVENTIONS:  - Continuous cardiac monitoring, monitor vital signs, obtain 12 lead EKG if indicated  - Evaluate effectiveness of antiarrhythmic and heart rate control medications as ordered  - Initiate emergency measures for life threatening arrhythmias  - Monitor  electrolytes and administer replacement therapy as ordered  Outcome: Progressing     Problem: GASTROINTESTINAL - ADULT  Goal: Minimal or absence of nausea and vomiting  Description: INTERVENTIONS:  - Maintain adequate hydration with IV or PO as ordered and tolerated  - Nasogastric tube to low intermittent suction as ordered  - Evaluate effectiveness of ordered antiemetic medications  - Provide nonpharmacologic comfort measures as appropriate  - Advance diet as tolerated, if ordered  - Obtain nutritional consult as needed  - Evaluate fluid balance  Outcome: Progressing  Goal: Maintains or returns to baseline bowel function  Description: INTERVENTIONS:  - Assess bowel function  - Maintain adequate hydration with IV or PO as ordered and tolerated  - Evaluate effectiveness of GI medications  - Encourage mobilization and activity  - Obtain nutritional consult as needed  - Establish a toileting routine/schedule  - Consider collaborating with pharmacy to review patient's medication profile  Outcome: Progressing

## 2024-12-19 NOTE — PROGRESS NOTES
[facility]  part of [facility]    Cardiology Progress Note    Alisha Wilde Patient Status:  Inpatient    10/25/1965 MRN [id number]   Location [facility] 2W/SW Attending Aguila Villegas MD   Hosp Day # 27 PCP Bridger Avendano MD     Interval History   Denies any new complaints  Remains on Labetalol  on TPN      No events overnight.  Telemetry sinus rhythm.      Assessment and Plan:   Acute hypoxic respiratory failure  Multiple aspiration events, emesis  Aspiration PNA  Suspected ileus  Type A aortic dissection  NAIMA on CKD-III, improved  Obesity  EtOH abuse        Presented with acute onset CP   -CT with type A aortic dissection above right coronary artery and extending distally into the left common iliac artery and external iliac   -s/p emergent successful repair on 24  -course complicated by failure to extubated resulting in Trach/PEG tube placement and multiple aspiration events associated with ileus    -CXR on  with likely aspiration PNA   -started on scheduled Reglan per GI    Hypertension  -TTE on  with hyperdynamic LVEF  -continue BP management wean off labetalol today and now resumed coreg f37.5 mg twice daily, clonidine  0.3 mg 3 times daily, combination hydralazine + isosorbide 150mg-40mg TID, amlodipine 10 mg daily    Volume overload  HFpEF  More euvolemic on exam further diuretics as per nephrology.  On Lasix 40 mg twice daily.      Complicated course.      Objective:   Patient Vitals for the past 24 hrs:   BP Temp Temp src Pulse Resp SpO2 Weight   24 1500 119/66 -- -- 70 18 92 % --   24 1400 124/66 -- -- 71 18 95 % --   24 1300 122/66 -- -- 68 18 97 % --   24 1200 124/68 98.4 °F (36.9 °C) Axillary 68 18 97 % --   24 1100 127/75 -- -- 69 18 98 % --   24 1000 129/71 -- -- 69 18 97 % --   24 0900 128/69 -- -- 70 18 97 % --   24 0800 (!) 143/118 97.7 °F (36.5 °C) Temporal 70 18 98 % --   24 0700 130/70  -- -- 69 18 99 % --   11/26/24 0621 -- -- -- -- -- -- 282 lb 3 oz (128 kg)   11/26/24 0600 95/53 -- -- 68 21 97 % --   11/26/24 0500 121/67 -- -- 70 18 97 % --   11/26/24 0400 119/68 98.4 °F (36.9 °C) Temporal 72 18 98 % --   11/26/24 0200 109/60 -- -- 72 21 96 % --   11/26/24 0130 -- -- -- 72 18 97 % --   11/26/24 0100 142/73 -- -- 71 18 98 % --   11/26/24 0030 -- -- -- 71 18 99 % --   11/26/24 0000 139/70 98.3 °F (36.8 °C) Temporal 70 18 98 % --   11/25/24 2300 130/69 -- -- 72 18 98 % --   11/25/24 2200 135/70 -- -- 71 18 98 % --   11/25/24 2100 138/71 -- -- 71 18 99 % --   11/25/24 2000 132/68 98.1 °F (36.7 °C) Temporal 69 18 99 % --   11/25/24 1900 124/64 -- -- 71 18 98 % --   11/25/24 1800 136/72 -- -- 71 18 98 % --   11/25/24 1700 138/70 -- -- 70 18 98 % --       Intake/Output:   Last 3 shifts:   Intake/Output                   11/24/24 0700 - 11/25/24 0659 11/25/24 0700 - 11/26/24 0659 11/26/24 0700 - 11/27/24 0659       Intake    P.O.  0  0  --    P.O. 0 0 --    I.V.  2276.7  1884.9  --    I.V. 154 615 --    Volume (mL) Insulin 53.9 37 --    Volume (mL) Nitroglycerin 236.9 54.5 --    Volume (mL) Dobutamine 4.5 -- --    Volume (mL) Propofol 477.8 713.9 --    Volume (mL) Dexmedetomidine 352.2 384.5 --    Volume (mL) (dextrose 5%-sodium chloride 0.45% infusion) 997.4 80 --    NG/GT  50  150  60    Intake (mL) (NG/OG Tube Orogastric 16 Fr. Right mouth) 50 150 60    IV PIGGYBACK  600  500  --    Volume (mL) (piperacillin-tazobactam (Zosyn) 3.375 g in dextrose 5% 100 mL IVPB-ADDV) 300 200 --    Volume (mL) (acetaminophen (Ofirmev) 10 mg/mL infusion premix 1,000 mg) 200 100 --    Volume (mL) (potassium chloride 20 mEq/100mL IVPB premix 20 mEq) -- 100 --    Volume (mL) (potassium chloride 40 mEq/100mL IVPB premix (central line) 40 mEq) 100 100 --    Total Intake 2926.7 2534.9 60       Output    Urine  1800  3570  800    Output (mL) (Urethral Catheter Latex;Temperature probe;Double-lumen) 1800 3570 800    Emesis/NG  output  550  365  --    Residual volume (ml) (NG/OG Tube Orogastric 16 Fr. Right mouth) -- 5 --    Output (mL) (NG/OG Tube Orogastric 16 Fr. Right mouth) 550 360 --    Chest Tube  188  125  30    Output (mL) ([REMOVED] Chest Tube Anterior Mediastinal 32 Fr.) 48 50 --    Output (mL) (Chest Tube Anterior Pericardial 24 Fr.) 140 75 30    Total Output 2538 4060 830       Net I/O     388.7 -1525.1 -770             Vent Settings: Vent Mode: PRVC/AC  FiO2 (%):  [30 %] 30 %  S RR:  [18] 18  S VT:  [550 mL] 550 mL  PEEP/CPAP (cm H2O):  [5 cm H20] 5 cm H20  MAP (cm H2O):  [10-13] 11    Hemodynamic parameters (last 24 hours):      Scheduled Meds:    escitalopram  5 mg Oral Nightly    metoclopramide  10 mg Intravenous Q6H    pantoprazole  40 mg Intravenous Daily    furosemide  40 mg Intravenous BID (Diuretic)    LORazepam  0.5 mg Intravenous QID    cloNIDine  1 patch Transdermal Weekly    piperacillin-tazobactam  3.375 g Intravenous Q8H    carvedilol  37.5 mg Oral BID with meals    aspirin  81 mg Peg Tube Daily    isosorbide dinitrate  40 mg Per NG Tube TID (Nitrates)    hydrALAZINE  150 mg Oral Q8H GABRIEL    amLODIPine  10 mg Oral Daily    heparin  5,000 Units Subcutaneous Q8H GABRIEL    chlorhexidine gluconate  15 mL Mouth/Throat BID       Continuous Infusions:    adult 3 in 1 TPN 83.3 mL/hr at 12/18/24 2125    dextrose 10%      labetalol (Trandate) 400 mg in sodium chloride 0.9% 200 mL infusion 0.5 mg/min (12/19/24 0736)    dextrose 10%      dexmedetomidine Stopped (12/17/24 0600)    niCARdipine Stopped (12/18/24 1800)       Results:     Lab Results   Component Value Date    WBC 10.4 12/19/2024    HGB 8.0 (L) 12/19/2024    HCT 25.2 (L) 12/19/2024    .0 12/19/2024    CREATSERUM 1.25 (H) 12/19/2024    BUN 21 12/19/2024     12/19/2024    K 4.1 12/19/2024     12/19/2024    CO2 27.0 12/19/2024     (H) 12/19/2024    CA 9.0 12/19/2024    ALB 3.2 12/15/2024    ALKPHO 68 12/15/2024    BILT 0.6 12/15/2024    TP 5.7  12/15/2024    AST 25 12/15/2024    ALT 23 12/15/2024    PTT 30.7 12/04/2024    INR 1.17 12/04/2024    TSH 2.085 12/07/2024    NATHALIE 99 11/25/2024    LIP 28 11/25/2024    DDIMER 0.42 12/08/2019    ESRML 15 06/05/2021    MG 2.2 12/19/2024    PHOS 4.9 12/19/2024    TROP <0.045 12/08/2019     (H) 09/23/2021    B12 1,156 (H) 07/23/2018       Recent Labs   Lab 12/17/24  0454 12/18/24  0409 12/19/24  0358   * 162* 139*   BUN 22 20 21   CREATSERUM 1.11* 1.12* 1.25*   CA 8.7 9.0 9.0    140 140   K 4.0 4.0 4.1    105 106   CO2 24.0 26.0 27.0     Recent Labs   Lab 12/17/24 0454 12/18/24  0409 12/19/24  0358   RBC 3.13* 3.10* 3.00*   HGB 8.3* 8.1* 8.0*   HCT 26.0* 25.5* 25.2*   MCV 83.1 82.3 84.0   MCH 26.5 26.1 26.7   MCHC 31.9 31.8 31.7   RDW 19.5* 19.2* 19.1*   NEPRELIM 6.60 6.96 7.58   WBC 10.8 10.5 10.4   .0 207.0 203.0       No results for input(s): \"BNPML\" in the last 168 hours.    No results for input(s): \"TROP\", \"CK\" in the last 168 hours.    XR CHEST AP PORTABLE  (CPT=71045)    Result Date: 12/19/2024  CONCLUSION:  1. Cardiomegaly.  Tortuous aorta. 2. Mild pulmonary interstitial prominence noted throughout. 3. Superimposed right basilar and left upper lobe alveolitis has improved. 4. Support tubes and catheters satisfactory.     Dictated by (CST): Woodrow Fischer MD on 12/19/2024 at 8:06 AM     Finalized by (CST): Woodrow Fischer MD on 12/19/2024 at 8:10 AM          XR ABDOMEN (1 VIEW) (CPT=74018)    Result Date: 12/18/2024  CONCLUSION:  1. Mild residual small bowel dilatation left mid abdomen measuring 4.1 cm has diminished.  Findings may reflect nonspecific small-bowel enteropathy, small-bowel ileus versus less likely a small bowel obstruction.    Dictated by (CST): Woodrow Fischer MD on 12/18/2024 at 1:26 PM     Finalized by (CST): Woodrow Fischer MD on 12/18/2024 at 1:29 PM          XR CHEST AP PORTABLE  (CPT=71045)    Result Date: 12/16/2024  CONCLUSION: Right basal  pulmonary opacity suggesting pneumonia.  No significant interval change since most recent exam from 12/15/24.  Findings are worse since 12/12/24.    Dictated by (CST): Jagjit Jin MD on 12/16/2024 at 10:19 AM     Finalized by (CST): Jagjit Jin MD on 12/16/2024 at 10:20 AM          XR ABDOMEN (1 VIEW) (CPT=74018)    Result Date: 12/16/2024  CONCLUSION: Nonspecific-nonobstructive bowel gas pattern.  Dilated bowel loops demonstrated on preceding exam is not redemonstrated.    Dictated by (CST): Jagjit Jin MD on 12/16/2024 at 10:16 AM     Finalized by (CST): Jagjit Jin MD on 12/16/2024 at 10:18 AM         EKG 12 Lead    Result Date: 12/19/2024  Normal sinus rhythm Nonspecific T wave abnormality Abnormal ECG When compared with ECG of 12-DEC-2024 13:34, ST no longer depressed in Inferior leads          Exam:     Physical Exam:  General: awake/alert overweight -American female  HEENT: +trach  Neck: No JVD, carotids 2+, no bruits.  Cardiac: Regular rate and rhythm. S1, S2 normal.   Lungs: + Crackles.  Abdomen: Soft, non-tender.BS-present.  Extremities: Without clubbing or cyanosis. + BLE edema.    Neurologic: unable to obtain  Skin: Warm and dry.         Boyd Draper MD.  Critical care time 32 minutes discussed with family bedside

## 2024-12-19 NOTE — PROGRESS NOTES
Pulmonary/ICU/Critical Care Progress Note        Reason for Consultation: post op care  Referring Physician: Dr. Gregg        SUBJECTIVE:  Afebrile on vent.  Nicardipine stopped last night.  Getting oral meds via PEG and tolerating.      ALLERGIES:  Allergies[1]        MEDS:  Home Medications:  Medications Taking[2]      Scheduled Medication:   escitalopram  5 mg Oral Nightly    metoclopramide  10 mg Intravenous Q6H    pantoprazole  40 mg Intravenous Daily    furosemide  40 mg Intravenous BID (Diuretic)    LORazepam  0.5 mg Intravenous QID    cloNIDine  1 patch Transdermal Weekly    piperacillin-tazobactam  3.375 g Intravenous Q8H    carvedilol  37.5 mg Oral BID with meals    aspirin  81 mg Peg Tube Daily    isosorbide dinitrate  40 mg Per NG Tube TID (Nitrates)    hydrALAZINE  150 mg Oral Q8H GABRIEL    amLODIPine  10 mg Oral Daily    heparin  5,000 Units Subcutaneous Q8H GABRIEL    chlorhexidine gluconate  15 mL Mouth/Throat BID     Continuous Infusing Medication:   adult 3 in 1 TPN 83.3 mL/hr at 12/18/24 2125    dextrose 10%      labetalol (Trandate) 400 mg in sodium chloride 0.9% 200 mL infusion 1 mg/min (12/18/24 2156)    dextrose 10%      dexmedetomidine Stopped (12/17/24 0600)    niCARdipine Stopped (12/18/24 1800)     PRN Medications:    dextrose 10%    lipase-protease-amylase (Lip-Prot-Amyl) **AND** sodium bicarbonate    LORazepam    dextrose 10%    fentaNYL    fentaNYL    bisacodyl    acetaminophen    sodium chloride    albuterol    hydrALAzine    acetaminophen    ipratropium-albuterol    HYDROcodone-acetaminophen    melatonin    potassium chloride **OR** potassium chloride    magnesium sulfate in dextrose 5%    magnesium sulfate in sterile water for injection       PHYSICAL EXAM:  /61 (BP Location: Left arm)   Pulse 79   Temp 98.1 °F (36.7 °C) (Temporal)   Resp 18   Ht 5' 5\" (1.651 m)   Wt 286 lb 6 oz (129.9 kg)   SpO2 98%   BMI 47.66 kg/m²   Vent Mode: PRVC/AC  FiO2 (%):  [30 %] 30 %  S RR:  [18]  18  S VT:  [550 mL-630 mL] 550 mL  PEEP/CPAP (cm H2O):  [5 cm H20] 5 cm H20  MAP (cm H2O):  [10-13] 12  CONSTITUTIONAL: intubated awake   HEENT: atraumatic normocephalic  MOUTH: MMM  NECK/THROAT: no JVD. Trachea midline. No obvious thyromegaly. + trach with min bleeding  LUNG: clearer BS b/l no wheezing, + crackles. Diminished at bases. Chest symmetric with respiratory motion. + midsternal surgical wound healing   HEART: regular rate and rhythm, no obvious murmers or gallops noted  ABD: soft non distended not tender. + bowel sounds. No organomegaly noted. + PEG tube to LIS  EXT: no clubbing, cyanosis, or edema noted. Pulses intact grossly      IMAGES:   KUB 12/18/24  1. Mild residual small bowel dilatation left mid abdomen measuring 4.1 cm has diminished.  Findings may reflect nonspecific small-bowel enteropathy, small-bowel ileus versus less likely a small bowel obstruction.     KUB 12/16/24  CONCLUSION: Nonspecific-nonobstructive bowel gas pattern.  Dilated bowel loops demonstrated on preceding exam is not redemonstrated.     CXR 12/16/24  CONCLUSION: Right basal pulmonary opacity suggesting pneumonia.  No significant interval change since most recent exam from 12/15/24.  Findings are worse since 12/12/24.     CXR 12/15/24  On my read RLL infiltrates more dense but less pulm edema  CARDIAC/MEDIAST: Heart and mediastinum are unchanged with previous median sternotomy.   LUNGS/PLEURA: Right greater left patchy bilateral lung opacity which may be mildly increased in the right lung base.  No significant pleural effusion.  No pneumothorax.   OTHER: Stable appearance of the osseous structures.  Tracheostomy tube and right PICC line also unchanged.     KUB 12/14/24  No change in dilation of SB  Significantly limited examination.   Ongoing dilation of small bowel loops up to 4.7 cm, previously 4.5 cm. Recommend continued radiographic/clinical follow-up.       CXR 12/12/24  CONCLUSION:   1. Cardiomegaly.  Tortuous aorta.    2. Bilateral mixed multifocal airspace consolidation with slight improved aeration left lung base.   3. Tracheostomy tube overlies the tracheal air column.  Right PICC line with the tip in the SVC.      KUB 12/12/24  CONCLUSION:   1. Nonspecific small bowel dilatation left mid abdomen and central abdomen measures up to 4.5 cm.   2. Mild stool scattered throughout the colon suggests constipation fecal retention.      CT A/P 12/10/24  CONCLUSION:   1. Fluid-filled loops of small bowel, which could reflect underlying infectious/inflammatory enteritis or malabsorption in the appropriate clinical setting. Bowel loops are mildly dilated without clear transition point to suggest mechanical bowel   obstruction, although early or partial bowel obstruction could have a similar appearance.   2. Extensive distal colonic diverticulosis without CT evidence of acute complication.    3. Bladder distension despite Webb catheter placement.   4. Hepatomegaly with hepatic steatosis.   5. Diffuse anasarca.   6. Partially visualized postthoracotomy chest with possible postoperative seroma/hematoma of the anterior chest wall.   7. Bilateral pleural effusions and associated basilar atelectasis, with or without superimposed pneumonia.   8. Additional scattered patchy nodular opacities may reflect infectious process.    9. Low-density appearance of the intracardiac blood pool raises the possibility of underlying anemia. Correlate with hematologic parameters.   10. Lesser incidental findings as above.     CXR 12/9/24  CONCLUSION:   1. Cardiomegaly.  Atherosclerotic aneurysmal thoracic aorta   2. Tracheostomy tube overlies tracheal air column.   3.  Bilateral mixed alveolar and interstitial multifocal airspace opacification has progressed.        KUB 12/9/24  CONCLUSION:   No huang dilatation of the small bowel to suggest small bowel obstruction.   Large cecal stool burden which may indicate constipation. Mild stool burden throughout the  remainder of the colon.     CXR 12/5/24  No significant change when compared to 12/04/2024.     CXR 12/4/24  CONCLUSION:   1. Mild cardiomegaly and minimally prominent pulmonary vascularity but no huang pulmonary edema.  ET tube and NG tubes have been removed.  Tracheostomy is now noted in the trachea extending to the level the clavicles.  No pneumothorax.   2. Persistent patchy bilateral upper lobe airspace disease right more than left suggesting persistent bilateral upper lobe pneumonia.  Correlate clinically and continued follow-up is advised.     CXR 12/2/24  FINDINGS:   POSITION: The patient is semi-erect and slightly rotated to the right.   DEVICES: There is an endotracheal tube terminating approximately 3.9 cm above the jennyfer.  A large-bore right internal jugular central venous vascular access sheath has tip projecting in the SVC. An enteric tube extends caudally beyond the field of view.   CARDIAC/VASC: The cardiomediastinal silhouette is accentuated by AP technique, but likely stably enlarged. There is mild tortuosity of the thoracic aorta with peripheral atherosclerotic vascular calcification. The pulmonary vascularity is within normal   limits.   MEDIAST/HAMLET: Surgical clips are present.   LUNGS/PLEURA: Elevation right hemidiaphragm is noted. Multifocal patchy airspace consolidation is demonstrated, slightly worse on the right side than left. Mild interstitial opacities are seen. Additional scattered reticular opacities may be atelectatic   in nature. No large pleural effusion or pneumothorax is evident.    BONES: Median sternotomy wires are demonstrated. Mild degenerative changes of the thoracic spine are apparent. There is no fracture or visible bony lesion.   OTHER: There may be surgical clips at the level of the thoracic inlet. Leads overlie the chest and obscure underlying detail     CXR 11/27/24  Moderate pulmonary edema, progressed since 11/26/2024.     CT c/a/p  CONCLUSION:  Low-lying ETT, 0.8 cm  above the jennyfer   Multifocal bilateral pulmonary nodular consolidation s    CXR 11/24/24  FINDINGS:   CARDIAC/VASC: The cardiac silhouette is exaggerated by AP portable technique. There is stable central pulmonary venous congestion.   MEDIAST/HAMLET:   No visible mass or adenopathy.   LUNGS/PLEURA: There are stable streaky opacities at the right lung base.  No pleural effusion or pneumothorax.   BONES: Sternotomy changes are again noted.   OTHER: An endotracheal tube tip projects 3.8 cm above the jennyfer.  An enteric tube again courses into the left upper quadrant.  A right internal jugular approach Stinnett-Dev catheter tip again projects over the pulmonary outflow tract.  A mediastinal drain tip again projects over the right suprahilar region.     CXR 11/23/24  On my read possible RML infiltrates  CONCLUSION: Post emergent acute type A aortic dissection repair.  Stable cardiomegaly.  Gross stable/satisfactory position of lines and tubes.  Mild pulmonary vascular congestion.       CXR 11/22/24  CONCLUSION: Post feeding tube placement with tip in the distal esophagus approximately 4 cm above the gastroesophageal junction.  Recommend advancement of the tube by approximately 10 cm to place it in the stomach.     CXR 11/22/24  CARDIAC/MEDIASTINUM: The cardiac silhouette is enlarged and unchanged.. There is a right internal jugular Stinnett-Dev catheter with tip at the level of the main pulmonary artery. There is a right-sided chest tube. Endotracheal tube with tip approximately    5.3 cm above the jennyfer. There is a nasogastric tube with tip and sidehole within the stomach and below the diaphragm. There are median sternotomy wires and postoperative changes of ascending aortic repair.   LUNGS: There is pulmonary vascular redistribution without edema. Minimal blunting of the bilateral costophrenic angles may be secondary to trace pleural effusions versus chronic pleural thickening. There is mild bibasilar atelectasis. No  pneumothorax.   BONES: There is degenerative disease of the thoracic spine.     Chest CTA 11/22/24  CONCLUSION:   1. Type a aortic dissection beginning just above right renal (correction:  Right coronary)  artery and extending distally into the left common iliac artery and external iliac.  Findings were called to Dr. Echavarria at 5:52 p.m.       LABS:  Recent Labs   Lab 12/12/24  0443 12/13/24  0904 12/16/24  0508 12/17/24  0454 12/18/24  0409   RBC 2.95*   < > 3.12* 3.13* 3.10*   HGB 8.3*   < > 8.5* 8.3* 8.1*   HCT 25.3*   < > 26.2* 26.0* 25.5*   MCV 85.8   < > 84.0 83.1 82.3   MCH 28.1   < > 27.2 26.5 26.1   MCHC 32.8   < > 32.4 31.9 31.8   RDW 16.8*   < > 19.5* 19.5* 19.2*   NEPRELIM 3.64  --   --  6.60 6.96   WBC 6.5   < > 9.8 10.8 10.5   .0   < > 222.0 225.0 207.0    < > = values in this interval not displayed.       Recent Labs   Lab 12/13/24  0612 12/14/24  0631 12/15/24  0445 12/16/24  0508 12/16/24  1129 12/17/24  0454 12/18/24  0409   *   < > 160*  160*   < > 168* 157* 162*   BUN 27*   < > 27*  27*   < > 24* 22 20   CREATSERUM 1.28*   < > 1.24*  1.24*   < > 1.19* 1.11* 1.12*   EGFRCR 48*   < > 50*  50*   < > 52* 57* 56*   CA 8.4*   < > 8.5*  8.5*   < > 8.6* 8.7 9.0   ALB 3.3  --  3.2  --   --   --   --    *   < > 146*  146*   < > 140 141 140   K 3.4*  3.4*   < > 4.6  4.6   < > 4.5 4.0 4.0   *   < > 116*  116*   < > 110 109 105   CO2 21.0   < > 23.0  23.0   < > 23.0 24.0 26.0   ALKPHO 73  --  68  --   --   --   --    AST 15  --  25  --   --   --   --    ALT 16  --  23  --   --   --   --    BILT 0.5  --  0.6  --   --   --   --    TP 5.8  --  5.7  --   --   --   --     < > = values in this interval not displayed.       ASSESSMENT/PLAN:  Type A aortic dissection s/p repair now intubated in ICU  -on labetalol gtt. Nicardipine stopped 12/18/24  -multiple oral antiHTN meds for BP now starting to be given since GI recommended start trickle feeds  -s/p pRBC transfusion 12/14/24 for  anemia. No obvious s/s bleeding    Post op acute respiratory failure  -complicated by extubation and reimergent intubation and significant emesis concerning for aspiration PNA vs pneumonitis  -started on zosyn-completed initial 10 day course  -checked ETT asp-candida likely contaminant  -failed multiple SBTs d/t increased RR, work of breathing, hypertension, hypoxemia, significant thick secretions  -remains MV dependent   -hx of likely TANYA and previous smoking hx. Has sign dense consolidations and atelectasis on chest CT  -s/p trach by ENT on 12/4/24 and PEG by GI on 12/5/24  -started trach/vent weaning as able-limited by HTN and  GI issues with recurrent emesis and persistent ileus  -PRN ABG and CXR  -saline nebs  -weaned off precedex  -psych consult for assistance with sedation and agitation-defer to their recs  -on scheduled clonidine patch in attempt to wean off precedex  -restarted on zosyn for recurrent aspiration   -daily SBT and attempt to wean from the vent     Previous hx of smoking and ETOH  -continue duonebs PRN    NAIMA on CKD and metabolic acidosis  -likely from surgery, and acute issues  -monitor UO and renal labs  -renal following   -diuresis as per cardiology and renal    Abd pain  -s/p abd CT as above  -not tolerating TF now  -PEG tube to LIS  -recurrent aspiration   -GI following-recommend trickle feeds    Proph:  -DVT: hep sq    Dispo:  -full code  -SW/ to assist with LTAC placement    Critical care time 30 min independent of procedures      Thank you for the opportunity to care for Alisha WildeShantell Rainey DO, MPH  Pulmonary Critical Care Medicine  [facility]                         [1]   Allergies  Allergen Reactions    Atorvastatin MYALGIA    Pravastatin MYALGIA    Rosuvastatin MYALGIA    Seasonal Runny nose   [2]   Outpatient Medications Marked as Taking for the 11/21/24 encounter (Hospital Encounter)   Medication Sig Dispense  Refill    Acetaminophen ER (TYLENOL 8 HOUR) 650 MG Oral Tab CR Take 2 tablets (1,300 mg total) by mouth daily as needed.      Calcium Carb-Cholecalciferol 600-10 MG-MCG Oral Tab Take 1 tablet by mouth daily.      metoprolol succinate ER 50 MG Oral Tablet 24 Hr Take 1 tablet (50 mg total) by mouth daily. 90 tablet 3    Losartan Potassium-HCTZ 100-12.5 MG Oral Tab Take 1 tablet by mouth daily. 90 tablet 3    Potassium Chloride ER 20 MEQ Oral Tab CR Take 1 tablet by mouth daily. 90 tablet 3    albuterol 108 (90 Base) MCG/ACT Inhalation Aero Soln Inhale 2 puffs into the lungs every 4 (four) hours as needed for Wheezing. 3 each 3    amLODIPine 10 MG Oral Tab Take 1 tablet (10 mg total) by mouth daily. 90 tablet 3    Ascorbic Acid (VITAMIN C) 1000 MG Oral Tab Take 1 tablet (1,000 mg total) by mouth daily.      vitamin B-12 50 MCG Oral Tab Take 1 tablet (50 mcg total) by mouth daily.

## 2024-12-19 NOTE — OCCUPATIONAL THERAPY NOTE
OCCUPATIONAL THERAPY EVALUATION - INPATIENT     Room Number: 233/233-A  Evaluation Date: 12/19/2024  Type of Evaluation: Initial  Presenting Problem:     Physician Order: IP Consult to Occupational Therapy  Reason for Therapy: ADL/IADL Dysfunction and Discharge Planning    OCCUPATIONAL THERAPY ASSESSMENT   Patient is a 59 year old female admitted 11/21/2024.  Prior to admission, patient's baseline is independent.  Patient is currently functioning below baseline with ADLS/mobillity.  Patient is requiring maximum assist and dependent as a result of the following impairments: decreased functional strength, decreased functional reach, decreased endurance, pain, impaired   balance, decreased muscular endurance, medical status, limited BLE/BUE ROM, and increased O2 needs from baseline. Occupational Therapy will continue to follow for duration of hospitalization.    Patient will benefit from continued skilled OT Services to promote return to prior level of function and safety with continuous assistance and gradual rehabilitative therapy. LTAC may be an appropriate dc plan.    PLAN DURING HOSPITALIZATION  OT Device Recommendations: TBD  OT Treatment Plan: Balance activities;Energy conservation/work simplification techniques;ADL training;Functional transfer training;UE strengthening/ROM;Endurance training;Patient/Family education;Patient/Family training;Equipment eval/education;Compensatory technique education;Fine motor coordination activities     OCCUPATIONAL THERAPY MEDICAL/SOCIAL HISTORY   Problem List  Active Problems:    Dissection of ascending aorta (HCC)    NAIMA (acute kidney injury) (HCC)    Stage 3 chronic kidney disease (HCC)    Acute respiratory failure with hypoxia (HCC)    Hypervolemia    Ileus (HCC)    Delirium due to another medical condition    Episodic mood disorder (HCC)    Hypernatremia    HOME SITUATION  Type of Home: Apartment  Home Layout: One level  Lives With: Alone  Shower/Tub and Equipment:  Tub-shower combo  Occupation/Status: was working 2 jobs  Drives: Yes  Patient Regularly Uses: Glasses    Prior Level of [place]: independent    SUBJECTIVE  Mouthing words during session to have suction from RN    OCCUPATIONAL THERAPY EXAMINATION      OBJECTIVE  Precautions: Other (Comment); Bed/chair alarm (trach to ventjeff)  Fall Risk: High fall risk      PAIN ASSESSMENT  Rating: Unable to rate         O2 SATURATIONS  Oxygen Therapy  SPO2% on Oxygen at Rest: 98 (trach to vent)    COGNITION  Following 100% of commands and interactions appropriate. Mouthing words intermittently during session.    VISION  Current Vision: wears glasses all the time    Communication: mouthing words to basic needs during session    Behavioral/Emotional/Social: cooperative, slightly anxious with rolling/mobility     RANGE OF MOTION   Upper extremity ROM limited by BUE weakness. Shoulder ~0-20 flexion, 0-45 elbow flexion, fingers limited with hand edema BUE but can make a slight fist    STRENGTH ASSESSMENT  Upper extremity strength 3-/5 grossly    COORDINATION  Gross Motor: impaired  Fine Motor: impaired with weakness    ACTIVITIES OF DAILY LIVING ASSESSMENT  AM-PAC ‘6-Clicks’ Inpatient Daily Activity Short Form  How much help from another person does the patient currently need…  -   Putting on and taking off regular lower body clothing?: Total  -   Bathing (including washing, rinsing, drying)?: A Lot  -   Toileting, which includes using toilet, bedpan or urinal? : Total  -   Putting on and taking off regular upper body clothing?: A Lot  -   Taking care of personal grooming such as brushing teeth?: Total  -   Eating meals?: Total    AM-PAC Score:  Score: 8  Approx Degree of Impairment: 85.69%  Standardized Score (AM-PAC Scale): 22.86  CMS Modifier (G-Code): CM    FUNCTIONAL TRANSFER ASSESSMENT  Not tested with pt's weakness     BED MOBILITY  Rolling: Maximum Assist (1-2 person)  Supine to Sit : Not tested  Pt assisted with  overhead lift assist to assist RN staff with weighing patient total A     FUNCTIONAL ADL ASSESSMENT  Eating: Dependent  Grooming Seated: Maximum Assist  UB Dressing Seated: Maximum Assist (donning/doffing gown)  LB Dressing Seated: Dependent  Toileting Seated: Dependent (supine with rolling), Ax2 for bed mobility +1 to assist for assist with hygiene after a large BM (increased time)    THERAPEUTIC EXERCISE: BUE elbow, fingers, shoulder flexion/horizontal abduction x10 reps     Skilled Therapy Provided: RN approved session. Coordinated with PT as pt requires assist x2 for safety with mobility tasks with extreme weakness/body habitus. Received patient in supine. Vitals: stable, pt on trach to vent PEEP 5, 30% FiO2 and maintained during session. Pt's RN present during session to assist with toileting tasks and suctioning patient. Performed ADLs/functional bed mobility. Provided skilled cues/education on OT role, POC, bed mobility, BADL training, compensatory strategies, HEP, energy conservation, balance, and patient with good receptiveness. At the end of session, patient left in supine with needs met, alarm on and RN aware of session. Family present      EDUCATION PROVIDED  Patient Education : Role of Occupational Therapy; Plan of Care; Posture/Positioning; Energy Conservation; Edema Reduction; LE HEP for ROM; LE HEP for Strengthening (UE HEP/ROM/strength)  Patient's Response to Education: Requires Further Education; Returned Demonstration; Verbalized Understanding    The patient's Approx Degree of Impairment: 85.69% has been calculated based on documentation in the [facility] '6 clicks' Inpatient Daily Activity Short Form.  Research supports that patients with this level of impairment may benefit from rehab.  Final disposition will be made by interdisciplinary medical team.     Patient End of Session: In bed;Needs met;Call light within reach;RN aware of session/findings;All patient questions and concerns addressed;Alarm  set;Family present    OT Goals  Patients self stated goal is: increased strength/activity tolerance for ADLs     Patient will complete functional transfer with max A  Comment:     Patient will complete UB dressing with mod A  Comment:     Patient will tolerate sitting at EOB for grooming  for 10 minutes in prep for adls with mod A   Comment:      Comment:          Goals  on: 2024  Frequency: 3-5x/wk    Patient Evaluation Complexity Level:   Occupational Profile/Medical History HIGH - Extensive review of history including review of medical or therapy records    Specific performance deficits impacting engagement in ADL/IADL HIGH  5+ performance deficits    Client Assessment/Performance Deficits HIGH - Comorbidities and significant modifications of tasks    Clinical Decision Making HIGH - Analysis of occupational profile, comprehensive assessments, multiple treatment options    Overall Complexity HIGH     OT Session Time: 50 minutes  Self-Care Home Management: 15 minutes  Therapeutic Activity: 10 minutes  Therapeutic Exercise: 15 minutes  10 min serena Gramajo, OTR/L

## 2024-12-19 NOTE — PHYSICAL THERAPY NOTE
PHYSICAL THERAPY EVALUATION - INPATIENT     Room Number: 233/233-A  Evaluation Date: 12/19/2024  Type of Evaluation: Initial      Presenting Problem: Dissection of acending aorta (hypoxic respiratoy failure)  Co-Morbidities : CKD, obesity, ETOH, Myalgia, anxiety, depression, post aortic dissection repair  Reason for Therapy: Mobility Dysfunction and Discharge Planning    PHYSICAL THERAPY ASSESSMENT   Patient is a 59 year old female admitted 11/21/2024 for Presented with acute onset CP s/p dissection of ascending aorta repair 11/22/2024 trached on vent support.  Prior to admission, patient's baseline is Indep with all mobility and ADL's lives alone in [facility] with elevator family lives in area nephew at bed side providing history.  Patient is currently functioning below baseline with bed mobility, transfers, gait, maintaining seated position, standing prolonged periods, and performing household tasks.  Patient is requiring maximum assist and dependent as a result of the following impairments: decreased functional strength, decreased endurance/aerobic capacity, pain, impaired sitting / standing balance, impaired coordination, impaired motor planning, decreased muscular endurance, cognitive deficits (A/O x 3), medical status, and increased O2 needs from baseline.  Physical Therapy will continue to follow for duration of hospitalization.    Patient will benefit from continued skilled PT services with LTAC recommended upon hospital D/C for vent weaning and ongoing PT, OT, SLP.     PLAN DURING HOSPITALIZATION  Nursing Mobility Recommendation : Lift Equipment  PT Device Recommendation: Mechanical lift  PT Treatment Plan: Bed mobility;Body mechanics;Endurance;Energy conservation;Family education;Range of motion;Strengthening;Transfer training;Balance training;Patient education  Rehab Potential : Fair  Frequency (Obs): 3x/week     PHYSICAL THERAPY MEDICAL/SOCIAL HISTORY   History related to current admission:   Presented with  acute onset CP   -CT with type A aortic dissection above right coronary artery and extending distally into the left common iliac artery and external iliac   -s/p emergent successful repair on 11/22/24  -course complicated by failure to extubated resulting in Trach/PEG tube placement and multiple aspiration events associated with ileus               -CXR on 12/16 with likely aspiration PNA              -started on scheduled Reglan per GI     Problem List  Active Problems:    Dissection of ascending aorta (HCC)    NAIMA (acute kidney injury) (HCC)    Stage 3 chronic kidney disease (HCC)    Acute respiratory failure with hypoxia (HCC)    Hypervolemia    Ileus (HCC)    Delirium due to another medical condition    Episodic mood disorder (HCC)    Hypernatremia      HOME SITUATION  Type of Home: House  Home Layout: Able to live on main level                     Lives With: Alone    Drives: Yes         Prior Level of Jay: Indep with all mobility and ADL's lives in condo alone drives working 2 jobs.     SUBJECTIVE  Non verbal trached with vent support mouths words    PHYSICAL THERAPY EXAMINATION   OBJECTIVE  Precautions: Cardiac;Bed/chair alarm  Fall Risk: High fall risk    WEIGHT BEARING RESTRICTION       PAIN ASSESSMENT  Rating: Unable to rate          COGNITION  Overall Cognitive Status:  Impaired    RANGE OF MOTION AND STRENGTH ASSESSMENT  Upper extremity ROM and strength are within functional limits   Lower extremity ROM is within functional limits   Lower extremity strength is within functional limits 3-/5    ACTIVITY TOLERANCE  Pulse: 84  Heart Rate Source: Monitor  Resp: 18  BP: 131/63  BP Location: Left arm  BP Method: Automatic  Patient Position: Lying    O2 WALK       AM-PAC '6-Clicks' INPATIENT SHORT FORM - BASIC MOBILITY  How much difficulty does the patient currently have...  Patient Difficulty: Turning over in bed (including adjusting bedclothes, sheets and blankets)?: A Lot   Patient Difficulty:  Sitting down on and standing up from a chair with arms (e.g., wheelchair, bedside commode, etc.): A Lot   Patient Difficulty: Moving from lying on back to sitting on the side of the bed?: A Lot   How much help from another person does the patient currently need...   Help from Another: Moving to and from a bed to a chair (including a wheelchair)?: Total   Help from Another: Need to walk in hospital room?: Total   Help from Another: Climbing 3-5 steps with a railing?: Total     AM-PAC Score:  Raw Score: 9   Approx Degree of Impairment: 81.38%   Standardized Score (AM-PAC Scale): 30.55   CMS Modifier (G-Code): CM    FUNCTIONAL ABILITY STATUS  Functional Mobility/Gait Assessment  Gait Assistance: Not tested  Assistive Device:  (oswaldo)  Rolling: maximum assist  Supine to Sit: maximum assist  Sit to Supine: dependent  Sit to Stand: dependent    Exercise/Education Provided:  Bed mobility  Body mechanics  Energy conservation  Functional activity tolerated  Neuromuscular re-educate  Posture  ROM  Strengthening  Lower therapeutic exercise:  Ankle pumps  Heel slides  SLR  Upper therapeutic exercise:  Elbow flex/ext,  - open/close, PNF 01, and PNF 02  Transfer training    Skilled Therapy Provided: PT cleared PT eval with RN team and PT, OT performed a co treatment as per pt tolerance. PT, OT, RN all worked cooperatively with repositioning and bed mobility with max A for rolling L and R side with use of side rails for patient assisted support to assist with pressure relief and clean up with linen change and skin care performed by RN.     Pt ed with sitting upright with use of mechanical lift to facilitated a protective upright sitting posture with bed scale zeroed out to assist RN team with accurate weight. Weight obtained from the mechanical lift d/t the bed scale not working. Pt assist with AAROM of UE's and LE's x 10 reps and energy conservation diaphragmatic breathing with suction and trach care performed cooperatively  from RN.     Pt is following all verbal cues appropriately and assisting with AAROM for all 4 extremities. Pt was assisted back to semi rodriguez position for comfort at the end of the PT session visiting with family. Pt will benefit from LTAC for vent weaning and ongoing PT, OT, SLP for maximal recovery and long term functional outcomes.     The patient's Approx Degree of Impairment: 81.38% has been calculated based on documentation in the [facility] '6 clicks' Inpatient Basic Mobility Short Form.  Research supports that patients with this level of impairment may benefit from LTAC IP rehab with PT, OT, SLP.    Final disposition will be made by interdisciplinary medical team.    Patient End of Session: With  staff;Needs met;In bed;Call light within reach;RN aware of session/findings;All patient questions and concerns addressed;Alarm set    CURRENT GOALS  Goals to be met by: 1//30/2024  Patient Goal Patient's self-stated goal is: Return home    Goal #1 Patient is able to demonstrate supine - sit EOB @ level: independent     Goal #1   Current Status    Goal #2 Patient is able to demonstrate transfers Sit to/from Stand at assistance level: modified independent with walker - rolling     Goal #2  Current Status    Goal #3 Patient is able to ambulate 50 feet with assist device: walker - rolling at assistance level: modified independent   Goal #3   Current Status    Goal #4    Goal #4   Current Status    Goal #5 Patient to demonstrate independence with home activity/exercise instructions provided to patient in preparation for discharge.   Goal #5   Current Status    Goal #6    Goal #6  Current Status      Patient Evaluation Complexity Level:  History Low - no personal factors and/or co-morbidities   Examination of body systems Low -  addressing 1-2 elements   Clinical Presentation Low- Stable   Clinical Decision Making  Low Complexity     Therapeutic Activity:  15 minutes  Therex: 10 minutes

## 2024-12-19 NOTE — PROGRESS NOTES
[facility]     Gastroenterology Progress Note    Alisha Wilde Patient Status:  Inpatient    10/25/1965 MRN [id number]   Location [facility] 2W/SW Attending Missy Paulson MD   Hosp Day # 27 PCP Bridger Avendano MD       Subjective:    Having bowel movements, denies abdominal pain. No nausea.   Objective:   Blood pressure 139/69, pulse 82, temperature 98 °F (36.7 °C), temperature source Temporal, resp. rate 18, height 5' 5\" (1.651 m), weight 285 lb 8 oz (129.5 kg), SpO2 97%, not currently breastfeeding. Body mass index is 47.51 kg/m².    Gen:awake, no acute distress  HEENT: mucus membranes appear moist, trach to vent  CV: RRR  Lung: mechanical breath sounds  Abdomen: soft NT, seems less distended today, peg luq  Skin: dry, warm, no jaundice  Ext: +edema  Neuro: appropriate    Assessment and Plan:   Alisha Wilde is a 59 year old female w/ PMHx of hypertension, GERD, CKD, hyperlipidemia who presented to ER 2024 for chest pain. Underwent emergent repair of aortic type A dissection on 2024. Inability to wean from the ventillator now s/p trach and PEG placement -.       # Prolonged sb ileus, improved  -s/p PEG   -CT abdomen pelvis 12/10 with dilated fluid-filled loops of small bowel, no transition point  -output from PEG appears to have slowed down   -KUB shows no further dilation of sb which is an improvement  - continue scheduled reglan to promote gastric motility   - ok to advance tube feeds as tolerated  - ok to use peg for meds  - wean off TPN as directed by dietician   -Avoid dysmotility agents    GI will be available as needed, please call with questions    Toma Barrientos MD      Results:     Lab Results   Component Value Date    WBC 10.4 2024    HGB 8.0 (L) 2024    HCT 25.2 (L) 2024    .0 2024    CREATSERUM 1.25 (H) 2024    BUN 21 2024     2024    K 4.1 2024     2024    CO2 27.0 2024      (H) 12/19/2024    CA 9.0 12/19/2024    ALB 3.2 12/15/2024    ALKPHO 68 12/15/2024    BILT 0.6 12/15/2024    TP 5.7 12/15/2024    AST 25 12/15/2024    ALT 23 12/15/2024    PTT 30.7 12/04/2024    INR 1.17 12/04/2024    TSH 2.085 12/07/2024    NATHALIE 99 11/25/2024    LIP 28 11/25/2024    DDIMER 0.42 12/08/2019    ESRML 15 06/05/2021    MG 2.2 12/19/2024    PHOS 4.9 12/19/2024    TROP <0.045 12/08/2019     (H) 09/23/2021    B12 1,156 (H) 07/23/2018       XR CHEST AP PORTABLE  (CPT=71045)    Result Date: 12/19/2024  CONCLUSION:  1. Cardiomegaly.  Tortuous aorta. 2. Mild pulmonary interstitial prominence noted throughout. 3. Superimposed right basilar and left upper lobe alveolitis has improved. 4. Support tubes and catheters satisfactory.     Dictated by (CST): Woodrow Fischer MD on 12/19/2024 at 8:06 AM     Finalized by (CST): Woodrow Fischer MD on 12/19/2024 at 8:10 AM          XR ABDOMEN (1 VIEW) (CPT=74018)    Result Date: 12/18/2024  CONCLUSION:  1. Mild residual small bowel dilatation left mid abdomen measuring 4.1 cm has diminished.  Findings may reflect nonspecific small-bowel enteropathy, small-bowel ileus versus less likely a small bowel obstruction.    Dictated by (CST): Woodrow Fischer MD on 12/18/2024 at 1:26 PM     Finalized by (CST): Woodrow Fischer MD on 12/18/2024 at 1:29 PM         EKG 12 Lead    Result Date: 12/19/2024  Normal sinus rhythm Nonspecific T wave abnormality Abnormal ECG When compared with ECG of 12-DEC-2024 13:34, ST no longer depressed in Inferior leads Confirmed by JENNIFFER CHACKO ([id number]) on 12/19/2024 10:10:34 AM

## 2024-12-19 NOTE — CM/SW NOTE
Gastroenterology telephone-visit 11/16/2023  Char Olsen PA-C     This was a remote telephone consult/appointment today for dysphagia.  Patient's consent for this telephone visit and consult was obtained 11/16/2023. Miquel Summers has verified that he/she is currently residing and present in the [facility] where provider is licensed.      SUBJECTIVE:  Miquel Summers is a 79 year old male who is the son, of Dr. Miquel Summers PCP for [place]. Aleksey Ramirez is here to discuss symptoms of epigastric pain and pressure. He notes a few weeks ago he had a 3 hour food bolus that became stuck.    Was unable to control saliva, had SOB,  eventually as he continued to cough it passed.    At time he has excess saliva and notes dysphagia with the first bite, and he has to stop eating as he \"knows it is going to be a problem.\"   Usually then in a few hours he can eat again.     There is perhaps mild weight loss,   He had covid 2 times, with 30 lb weight loss.     FHX: denies known family history of dysphagia.   No known autoimmune disease.  ShX: smoker in the late 1950's 1960's.   ETOH     Physical Exam   Height: 6'  Weight: 196lbs  Obtained verbally per patient via telephone chat/visit today.    Assessment/plan:  1) dysphagia    Discussed given his sx, esophagram first and pending findings likely EGD to follow.     Obtain and schedule esophagram. Further discussion after results of the above.    Diagnoses and all orders for this visit:    Dysphagia, unspecified type       supervising physician Dr. Christian Sullivan who is in agreement with the above assessment and plan.     >25 min visit including, interview, discussion.    This visit was performed via live via telephone  Clinician Location: [facility]   Patient Location: Home     The above treatment and advise was provided based on what was reported by the patient via telephone chat/visit today.  Without the patient being seen and  Liasandra Alford at [facility] notified CM that ins auth has been obtained for LTAC placement, RN and MD made aware. Pt currently no tolerating tube feedings per RN.    12/20/2024 at 1140: Probable weekend dc. [facility] placed on will call from 12/21-23, PCS updated.    Plan: [facility] for LTAC pending medical clearance.    VAISHALI Vasquez    [phone]    evaluated in person, there would be some risk that the information and/or assessment provided by me might be incomplete or inaccurate.     E copy to Dr. Zayas, thanks.

## 2024-12-19 NOTE — PROGRESS NOTES
CV Surgery    Patient seen and examined  No acute events  Labs reviewed    Plan:  PEG for meds is okay  Wean IV meds as able  Continue TPN  Overall goals are for improved gut motility so we can use the G-tube for meds and feeding, to wean the TPN, wean the IV antihypertensives and discharged to LTAC.    Rafa Gregg MD  Cardiothoracic Surgery  [facility]

## 2024-12-19 NOTE — PLAN OF CARE
Patient repositioned Q2. PEG clamped and okay to use for meds and trickle feeds. CV Surg says no trickle feed today, only meds. No emesis or nausea reported. No Bms. ROM exercises performed with patient. Patient with low mood this AM, motivated this evening.  No PRNs needed or utilized. IV gtts weaned as tolerated.     Problem: Diabetes/Glucose Control  Goal: Glucose maintained within prescribed range  Description: INTERVENTIONS:  - Monitor Blood Glucose as ordered  - Assess for signs and symptoms of hyperglycemia and hypoglycemia  - Administer ordered medications to maintain glucose within target range  - Assess barriers to adequate nutritional intake and initiate nutrition consult as needed  - Instruct patient on self management of diabetes  Outcome: Progressing     Problem: CARDIOVASCULAR - ADULT  Goal: Maintains optimal cardiac output and hemodynamic stability  Description: INTERVENTIONS:  - Monitor vital signs, rhythm, and trends  - Monitor for bleeding, hypotension and signs of decreased cardiac output  - Evaluate effectiveness of vasoactive medications to optimize hemodynamic stability  - Monitor arterial and/or venous puncture sites for bleeding and/or hematoma  - Assess quality of pulses, skin color and temperature  - Assess for signs of decreased coronary artery perfusion - ex. Angina  - Evaluate fluid balance, assess for edema, trend weights  Outcome: Progressing  Goal: Absence of cardiac arrhythmias or at baseline  Description: INTERVENTIONS:  - Continuous cardiac monitoring, monitor vital signs, obtain 12 lead EKG if indicated  - Evaluate effectiveness of antiarrhythmic and heart rate control medications as ordered  - Initiate emergency measures for life threatening arrhythmias  - Monitor electrolytes and administer replacement therapy as ordered  Outcome: Progressing     Problem: GASTROINTESTINAL - ADULT  Goal: Minimal or absence of nausea and vomiting  Description: INTERVENTIONS:  - Maintain adequate  hydration with IV or PO as ordered and tolerated  - Nasogastric tube to low intermittent suction as ordered  - Evaluate effectiveness of ordered antiemetic medications  - Provide nonpharmacologic comfort measures as appropriate  - Advance diet as tolerated, if ordered  - Obtain nutritional consult as needed  - Evaluate fluid balance  Outcome: Progressing     Problem: METABOLIC/FLUID AND ELECTROLYTES - ADULT  Goal: Glucose maintained within prescribed range  Description: INTERVENTIONS:  - Monitor Blood Glucose as ordered  - Assess for signs and symptoms of hyperglycemia and hypoglycemia  - Administer ordered medications to maintain glucose within target range  - Assess barriers to adequate nutritional intake and initiate nutrition consult as needed  - Instruct patient on self management of diabetes  Outcome: Progressing     Problem: SKIN/TISSUE INTEGRITY - ADULT  Goal: Skin integrity remains intact  Description: INTERVENTIONS  - Assess and document risk factors for pressure ulcer development  - Assess and document skin integrity  - Monitor for areas of redness and/or skin breakdown  - Initiate interventions, skin care algorithm/standards of care as needed  Outcome: Progressing  Goal: Incision(s), wounds(s) or drain site(s) healing without S/S of infection  Description: INTERVENTIONS:  - Assess and document risk factors for pressure ulcer development  - Assess and document skin integrity  - Assess and document dressing/incision, wound bed, drain sites and surrounding tissue  - Implement wound care per orders  - Initiate isolation precautions as appropriate  - Initiate Pressure Ulcer prevention bundle as indicated  Outcome: Progressing  Goal: Oral mucous membranes remain intact  Description: INTERVENTIONS  - Assess oral mucosa and hygiene practices  - Implement preventative oral hygiene regimen  - Implement oral medicated treatments as ordered  Outcome: Progressing     Problem: NEUROLOGICAL - ADULT  Goal: Achieves  stable or improved neurological status  Description: INTERVENTIONS  - Assess for and report changes in neurological status  - Initiate measures to prevent increased intracranial pressure  - Maintain blood pressure and fluid volume within ordered parameters to optimize cerebral perfusion and minimize risk of hemorrhage  - Monitor temperature, glucose, and sodium. Initiate appropriate interventions as ordered  Outcome: Progressing     Problem: PAIN - ADULT  Goal: Verbalizes/displays adequate comfort level or patient's stated pain goal  Description: INTERVENTIONS:  - Encourage pt to monitor pain and request assistance  - Assess pain using appropriate pain scale  - Administer analgesics based on type and severity of pain and evaluate response  - Implement non-pharmacological measures as appropriate and evaluate response  - Consider cultural and social influences on pain and pain management  - Manage/alleviate anxiety  - Utilize distraction and/or relaxation techniques  - Monitor for opioid side effects  - Notify MD/LIP if interventions unsuccessful or patient reports new pain  - Anticipate increased pain with activity and pre-medicate as appropriate  Outcome: Progressing     Problem: RESPIRATORY - ADULT  Goal: Achieves optimal ventilation and oxygenation  Description: INTERVENTIONS:  - Assess for changes in respiratory status  - Assess for changes in mentation and behavior  - Position to facilitate oxygenation and minimize respiratory effort  - Oxygen supplementation based on oxygen saturation or ABGs  - Provide Smoking Cessation handout, if applicable  - Encourage broncho-pulmonary hygiene including cough, deep breathe, Incentive Spirometry  - Assess the need for suctioning and perform as needed  - Assess and instruct to report SOB or any respiratory difficulty  - Respiratory Therapy support as indicated  - Manage/alleviate anxiety  - Monitor for signs/symptoms of CO2 retention  Outcome: Not Progressing     Problem:  GASTROINTESTINAL - ADULT  Goal: Maintains or returns to baseline bowel function  Description: INTERVENTIONS:  - Assess bowel function  - Maintain adequate hydration with IV or PO as ordered and tolerated  - Evaluate effectiveness of GI medications  - Encourage mobilization and activity  - Obtain nutritional consult as needed  - Establish a toileting routine/schedule  - Consider collaborating with pharmacy to review patient's medication profile  Outcome: Not Progressing     Problem: MUSCULOSKELETAL - ADULT  Goal: Return mobility to safest level of function  Description: INTERVENTIONS:  - Assess patient stability and activity tolerance for standing, transferring and ambulating w/ or w/o assistive devices  - Assist with transfers and ambulation using safe patient handling equipment as needed  - Ensure adequate protection for wounds/incisions during mobilization  - Obtain PT/OT consults as needed  - Advance activity as appropriate  - Communicate ordered activity level and limitations with patient/family  Outcome: Not Progressing

## 2024-12-20 LAB
ANION GAP SERPL CALC-SCNC: 8 MMOL/L (ref 0–18)
BASOPHILS # BLD AUTO: 0.04 X10(3) UL (ref 0–0.2)
BASOPHILS NFR BLD AUTO: 0.4 %
BUN BLD-MCNC: 27 MG/DL (ref 9–23)
BUN/CREAT SERPL: 18.2 (ref 10–20)
CALCIUM BLD-MCNC: 9.3 MG/DL (ref 8.7–10.4)
CHLORIDE SERPL-SCNC: 104 MMOL/L (ref 98–112)
CO2 SERPL-SCNC: 26 MMOL/L (ref 21–32)
CREAT BLD-MCNC: 1.48 MG/DL
DEPRECATED RDW RBC AUTO: 60.9 FL (ref 35.1–46.3)
EGFRCR SERPLBLD CKD-EPI 2021: 40 ML/MIN/1.73M2 (ref 60–?)
EOSINOPHIL # BLD AUTO: 0.3 X10(3) UL (ref 0–0.7)
EOSINOPHIL NFR BLD AUTO: 3 %
ERYTHROCYTE [DISTWIDTH] IN BLOOD BY AUTOMATED COUNT: 19.6 % (ref 11–15)
GLUCOSE BLD-MCNC: 133 MG/DL (ref 70–99)
GLUCOSE BLDC GLUCOMTR-MCNC: 131 MG/DL (ref 70–99)
GLUCOSE BLDC GLUCOMTR-MCNC: 134 MG/DL (ref 70–99)
GLUCOSE BLDC GLUCOMTR-MCNC: 135 MG/DL (ref 70–99)
GLUCOSE BLDC GLUCOMTR-MCNC: 147 MG/DL (ref 70–99)
HCT VFR BLD AUTO: 25.2 %
HGB BLD-MCNC: 8.2 G/DL
IMM GRANULOCYTES # BLD AUTO: 0.23 X10(3) UL (ref 0–1)
IMM GRANULOCYTES NFR BLD: 2.3 %
LYMPHOCYTES # BLD AUTO: 0.98 X10(3) UL (ref 1–4)
LYMPHOCYTES NFR BLD AUTO: 9.9 %
MAGNESIUM SERPL-MCNC: 2.2 MG/DL (ref 1.6–2.6)
MCH RBC QN AUTO: 27.7 PG (ref 26–34)
MCHC RBC AUTO-ENTMCNC: 32.5 G/DL (ref 31–37)
MCV RBC AUTO: 85.1 FL
MONOCYTES # BLD AUTO: 0.72 X10(3) UL (ref 0.1–1)
MONOCYTES NFR BLD AUTO: 7.3 %
NEUTROPHILS # BLD AUTO: 7.66 X10 (3) UL (ref 1.5–7.7)
NEUTROPHILS # BLD AUTO: 7.66 X10(3) UL (ref 1.5–7.7)
NEUTROPHILS NFR BLD AUTO: 77.1 %
OSMOLALITY SERPL CALC.SUM OF ELEC: 293 MOSM/KG (ref 275–295)
PHOSPHATE SERPL-MCNC: 5 MG/DL (ref 2.4–5.1)
PLATELET # BLD AUTO: 203 10(3)UL (ref 150–450)
POTASSIUM SERPL-SCNC: 4.2 MMOL/L (ref 3.5–5.1)
RBC # BLD AUTO: 2.96 X10(6)UL
SODIUM SERPL-SCNC: 138 MMOL/L (ref 136–145)
TRIGL SERPL-MCNC: 125 MG/DL (ref 30–149)
WBC # BLD AUTO: 9.9 X10(3) UL (ref 4–11)

## 2024-12-20 PROCEDURE — 99233 SBSQ HOSP IP/OBS HIGH 50: CPT | Performed by: STUDENT IN AN ORGANIZED HEALTH CARE EDUCATION/TRAINING PROGRAM

## 2024-12-20 PROCEDURE — 99233 SBSQ HOSP IP/OBS HIGH 50: CPT | Performed by: INTERNAL MEDICINE

## 2024-12-20 RX ORDER — HYDRALAZINE HYDROCHLORIDE 20 MG/ML
10 INJECTION INTRAMUSCULAR; INTRAVENOUS
Status: DISCONTINUED | OUTPATIENT
Start: 2024-12-20 | End: 2024-12-23

## 2024-12-20 RX ORDER — METOCLOPRAMIDE HYDROCHLORIDE 5 MG/ML
5 INJECTION INTRAMUSCULAR; INTRAVENOUS EVERY 6 HOURS
Status: DISCONTINUED | OUTPATIENT
Start: 2024-12-20 | End: 2024-12-23

## 2024-12-20 NOTE — PLAN OF CARE
Labetalol weaning. Protonix increased d/t patient complaint of burning sensation in esophagus.  Pt with moderate volume emesis within one hour of administrating am meds.  Pt reporting nausea despite scheduled reglan.  Zofran prn given x1 with improvement.  Incontinent BM x2. Webb out, bladder scan Q6hrs.  Tolerated SBT x 2hrs.  Family at bedside updated with plan of care.      Problem: RESPIRATORY - ADULT  Goal: Achieves optimal ventilation and oxygenation  Description: INTERVENTIONS:  - Assess for changes in respiratory status  - Assess for changes in mentation and behavior  - Position to facilitate oxygenation and minimize respiratory effort  - Oxygen supplementation based on oxygen saturation or ABGs  - Provide Smoking Cessation handout, if applicable  - Encourage broncho-pulmonary hygiene including cough, deep breathe, Incentive Spirometry  - Assess the need for suctioning and perform as needed  - Assess and instruct to report SOB or any respiratory difficulty  - Respiratory Therapy support as indicated  - Manage/alleviate anxiety  - Monitor for signs/symptoms of CO2 retention  Outcome: Progressing     Problem: GASTROINTESTINAL - ADULT  Goal: Minimal or absence of nausea and vomiting  Description: INTERVENTIONS:  - Maintain adequate hydration with IV or PO as ordered and tolerated  - Nasogastric tube to low intermittent suction as ordered  - Evaluate effectiveness of ordered antiemetic medications  - Provide nonpharmacologic comfort measures as appropriate  - Advance diet as tolerated, if ordered  - Obtain nutritional consult as needed  - Evaluate fluid balance  Outcome: Progressing  Goal: Maintains or returns to baseline bowel function  Description: INTERVENTIONS:  - Assess bowel function  - Maintain adequate hydration with IV or PO as ordered and tolerated  - Evaluate effectiveness of GI medications  - Encourage mobilization and activity  - Obtain nutritional consult as needed  - Establish a toileting  routine/schedule  - Consider collaborating with pharmacy to review patient's medication profile  Outcome: Progressing

## 2024-12-20 NOTE — RESPIRATORY THERAPY NOTE
Trach care and suction provided, bilateral bs, no acute events or changes made overnight, RT will continue to monitor.           12/20/24 0316   Vent Information   Interface Invasive   Vent Type AV   Vent plugged into main power? Yes   Vent Mode PRVC/AC   Settings   FiO2 (%) 30 %   Resp Rate (Set) 18   Vt (Set, mL) 550 mL   Waveform Decelerating ramp   PEEP/CPAP (cm H2O) 5 cm H20   Peak Flow LPM 60   Trigger Sensitivity Flow (L/min) 1 L/min   Humidification Heater   H2O Bag Level 1/2 Full   Heater Temperature 98.6 °F (37 °C)   Readings   Total RR 18   Minute Ventilation (L/min) 10.2 L/min   Expiratory Tidal Volume 560 mL   PIP Observed (cm H2O) 27 cm H2O   MAP (cm H2O) 11   I/E Ratio 1:2.3   Plateau Pressure (cm H2O) 24 cm H2O   Static Compliance (L/cm H2O) 29   Dynamic Compliance (L/cm H2O) 28 L/cm H2O      none

## 2024-12-20 NOTE — PROGRESS NOTES
Patient is a 59 year old -American, single female with past medical history of CKD, hypertension, high cholesterol who was admitted to the hospital for aortic dissection. The patient has been demonstrating alternation in mood and cognition with episodes of increased restlessness and agitation. According to team, they are trying to wean patient of propofol and precedex. Patient indicated for psych consult for evaluation and advise.  Consult Duration     The patient seen for over 35-minute, follow-up evaluation, over 50% counseling and coordinating care addressing agitation, restlessness, medication management..    Record reviewed, communication with attending, communication with RN and patient seen face to face evaluation.  History of Present Illness:     According to the staff the patient is demonstrating low mood with some anxiety.    The patient is seen today in the presence of her sister and nephew.  Patient appears somewhat apprehensive and her voice has not been as clear as yesterday.    Possible some family dynamic otherwise patient nodding to the question if she is enjoying family around.    The patient has not been demonstrating any hallucination or delusional ideation.  Patient started on Lexapro yesterday.  Patient continued vomiting.  Patient denying any suicidal or homicidal ideation.    Validating the patient for her anxiety and the extensive medical condition.  Patient became tearful nodding in her head.    The patient did not demonstrate any auditory or visual hallucination.  Patient did not demonstrate response to internal stimuli.    The patient has not been taking any sedative medication but only lorazepam 0.5 mg 3 times daily.        Past Medical History  Past Medical History:    Chronic kidney disease (CKD)    Esophageal reflux    High blood pressure    High cholesterol    HYPERTENSION    Hypertension    Unspecified essential hypertension       Past Surgical History  Past Surgical History:    Procedure Laterality Date    Colonoscopy N/A 2018    Procedure: COLONOSCOPY;  Surgeon: Magdy Webber MD;  Location: [facility] ENDOSCOPY    Endometrial ablation      child passed away for 5 mins          1    Other surgical history  11    cysto-Dr. Lacey -- pt denies       Family History  Family History   Problem Relation Age of Onset    Hypertension Mother     Heart Disorder Mother 70    Other (Other) Mother         kidney failure    Hypertension Father     Hypertension Maternal Grandfather     Cancer Neg     Diabetes Neg     Glaucoma Neg     Macular degeneration Neg        Social History  Social History     Socioeconomic History    Marital status: Single   Tobacco Use    Smoking status: Former     Current packs/day: 0.00     Average packs/day: 1 pack/day for 13.0 years (13.0 ttl pk-yrs)     Types: Cigarettes     Start date:      Quit date:      Years since quittin.9    Smokeless tobacco: Former   Vaping Use    Vaping status: Never Used   Substance and Sexual Activity    Alcohol use: Yes     Alcohol/week: 1.0 - 2.0 standard drink of alcohol     Types: 1 - 2 Cans of beer per week     Comment: every day    Drug use: No     Social Drivers of Health     Food Insecurity: Unknown (2024)    Food Insecurity     Food Insecurity: Patient unable to answer   Transportation Needs: Unknown (2024)    Transportation Needs     Lack of Transportation: Patient unable to answer   Housing Stability: Unknown (2024)    Housing Stability     Housing Instability: Patient unable to answer           Current Medications:  Current Facility-Administered Medications   Medication Dose Route Frequency    adult 3 in 1 TPN   Intravenous Continuous TPN    ondansetron (Zofran) 4 MG/2ML injection 4 mg  4 mg Intravenous Q6H PRN    pantoprazole (Protonix) 40 mg in sodium chloride 0.9% PF 10 mL IV push  40 mg Intravenous Q12H    adult 3 in 1 TPN   Intravenous Continuous TPN    escitalopram (Lexapro) tab  5 mg  5 mg Oral Nightly    metoclopramide (Reglan) 5 mg/mL injection 10 mg  10 mg Intravenous Q6H    furosemide (Lasix) 10 mg/mL injection 40 mg  40 mg Intravenous BID (Diuretic)    dextrose 10% infusion (TPN no rate)   Intravenous Continuous PRN    pancrelipase (Lip-Prot-Amyl) (Zenpep) DR particles cap 10,000 Units  10,000 Units Per G Tube PRN    And    sodium bicarbonate tab 325 mg  325 mg Oral PRN    LORazepam (Ativan) 2 mg/mL injection 0.5 mg  0.5 mg Intravenous QID    LORazepam (Ativan) 2 mg/mL injection 1 mg  1 mg Intravenous Q4H PRN    labetalol (Trandate) 400 mg in sodium chloride 0.9% 200 mL infusion  0.5-10 mg/min Intravenous Continuous    dextrose 10% infusion (TPN no rate)   Intravenous Continuous PRN    fentaNYL (Sublimaze) 50 mcg/mL injection 25 mcg  25 mcg Intravenous Q2H PRN    fentaNYL (Sublimaze) 50 mcg/mL injection 50 mcg  50 mcg Intravenous Q2H PRN    bisacodyl (Dulcolax) 10 MG rectal suppository 10 mg  10 mg Rectal Daily PRN    dexmedeTOMIDine (Precedex) 800 mcg in sodium chloride 0.9% 100 mL infusion  0.2-1.5 mcg/kg/hr (Dosing Weight) Intravenous Continuous    acetaminophen (Ofirmev) 10 mg/mL infusion premix 1,000 mg  1,000 mg Intravenous Q6H PRN    cloNIDine (Catapres) 0.3 MG/24HR patch 1 patch  1 patch Transdermal Weekly    sodium chloride 3 % nebulizer solution 3 mL  3 mL Nebulization PRN    albuterol (Ventolin) (2.5 MG/3ML) 0.083% nebulizer solution 2.5 mg  2.5 mg Nebulization Q6H PRN    piperacillin-tazobactam (Zosyn) 3.375 g in dextrose 5% 100 mL IVPB-ADDV  3.375 g Intravenous Q8H    hydrALAzine (Apresoline) 20 mg/mL injection 10 mg  10 mg Intravenous Q4H PRN    carvedilol (Coreg) tab 37.5 mg  37.5 mg Oral BID with meals    acetaminophen (Tylenol) 160 MG/5ML oral liquid 650 mg  650 mg Oral Q6H PRN    aspirin chewable tab 81 mg  81 mg Peg Tube Daily    niCARdipine (carDENE) 125 mg in sodium chloride 0.9% 250 mL infusion  5-15 mg/hr Intravenous Continuous    isosorbide dinitrate (Isordil)  tab 40 mg  40 mg Per NG Tube TID (Nitrates)    hydrALAZINE (Apresoline) tab 150 mg  150 mg Oral Q8H GABRIEL    amLODIPine (Norvasc) tab 10 mg  10 mg Oral Daily    ipratropium-albuterol (Duoneb) 0.5-2.5 (3) MG/3ML inhalation solution 3 mL  3 mL Nebulization Q6H PRN    HYDROcodone-acetaminophen (Norco) 5-325 MG per tab 2 tablet  2 tablet Oral Q4H PRN    heparin (Porcine) 5000 UNIT/ML injection 5,000 Units  5,000 Units Subcutaneous Q8H GABRIEL    melatonin tab 3 mg  3 mg Oral Nightly PRN    potassium chloride 20 mEq/100mL IVPB premix 20 mEq  20 mEq Intravenous PRN    Or    potassium chloride 40 mEq/100mL IVPB premix (central line) 40 mEq  40 mEq Intravenous PRN    magnesium sulfate in dextrose 5% 1 g/100mL infusion premix 1 g  1 g Intravenous PRN    magnesium sulfate in sterile water for injection 2 g/50mL IVPB premix 2 g  2 g Intravenous PRN    chlorhexidine gluconate (Peridex) 0.12 % oral solution 15 mL  15 mL Mouth/Throat BID     Medications Prior to Admission   Medication Sig    Acetaminophen ER (TYLENOL 8 HOUR) 650 MG Oral Tab CR Take 2 tablets (1,300 mg total) by mouth daily as needed.    Calcium Carb-Cholecalciferol 600-10 MG-MCG Oral Tab Take 1 tablet by mouth daily.    metoprolol succinate ER 50 MG Oral Tablet 24 Hr Take 1 tablet (50 mg total) by mouth daily.    Losartan Potassium-HCTZ 100-12.5 MG Oral Tab Take 1 tablet by mouth daily.    Potassium Chloride ER 20 MEQ Oral Tab CR Take 1 tablet by mouth daily.    albuterol 108 (90 Base) MCG/ACT Inhalation Aero Soln Inhale 2 puffs into the lungs every 4 (four) hours as needed for Wheezing.    amLODIPine 10 MG Oral Tab Take 1 tablet (10 mg total) by mouth daily.    Ascorbic Acid (VITAMIN C) 1000 MG Oral Tab Take 1 tablet (1,000 mg total) by mouth daily.    vitamin B-12 50 MCG Oral Tab Take 1 tablet (50 mcg total) by mouth daily.    fluticasone propionate 50 MCG/ACT Nasal Suspension 2 sprays by Nasal route daily.    fluticasone-salmeterol 250-50 MCG/ACT Inhalation Aerosol  Powder, Breath Activated Inhale 1 puff into the lungs 2 (two) times daily. inhale 1 puff by INHALATION route 2 times every day morning and evening approximately 12 hours apart (Patient not taking: Reported on 11/21/2024)       Allergies  Allergies[1]    Review of Systems:   As by Admitting/Attending    Results:   Laboratory Data:  Lab Results   Component Value Date    WBC 10.4 12/19/2024    HGB 8.0 (L) 12/19/2024    HCT 25.2 (L) 12/19/2024    .0 12/19/2024    CREATSERUM 1.25 (H) 12/19/2024    BUN 21 12/19/2024     12/19/2024    K 4.1 12/19/2024     12/19/2024    CO2 27.0 12/19/2024     (H) 12/19/2024    CA 9.0 12/19/2024    ALB 3.2 12/15/2024    ALKPHO 68 12/15/2024    TP 5.7 12/15/2024    AST 25 12/15/2024    ALT 23 12/15/2024    PTT 30.7 12/04/2024    INR 1.17 12/04/2024    PTP 15.6 (H) 12/04/2024    TSH 2.085 12/07/2024    NATHALIE 99 11/25/2024    LIP 28 11/25/2024    DDIMER 0.42 12/08/2019    ESRML 15 06/05/2021    MG 2.2 12/19/2024    PHOS 4.9 12/19/2024    TROP <0.045 12/08/2019     (H) 09/23/2021    B12 1,156 (H) 07/23/2018         Imaging:  XR CHEST AP PORTABLE  (CPT=71045)    Result Date: 12/19/2024  CONCLUSION:  1. Cardiomegaly.  Tortuous aorta. 2. Mild pulmonary interstitial prominence noted throughout. 3. Superimposed right basilar and left upper lobe alveolitis has improved. 4. Support tubes and catheters satisfactory.     Dictated by (CST): Woodrow Fischer MD on 12/19/2024 at 8:06 AM     Finalized by (CST): Woodrow Fischer MD on 12/19/2024 at 8:10 AM          XR ABDOMEN (1 VIEW) (CPT=74018)    Result Date: 12/18/2024  CONCLUSION:  1. Mild residual small bowel dilatation left mid abdomen measuring 4.1 cm has diminished.  Findings may reflect nonspecific small-bowel enteropathy, small-bowel ileus versus less likely a small bowel obstruction.    Dictated by (CST): Woodrow Fischer MD on 12/18/2024 at 1:26 PM     Finalized by (CST): Woodrow Fischer MD on  12/18/2024 at 1:29 PM           Vital Signs:   Blood pressure 119/71, pulse 84, temperature 97.5 °F (36.4 °C), temperature source Axillary, resp. rate 18, height 65\", weight 129.5 kg (285 lb 8 oz), SpO2 98%, not currently breastfeeding.    Mental Status Exam:   Appearance: Stated age female, in hospital gown, laying down in hospital bed.  Patient with tracheostomy.  Psychomotor: Slow psychomotor function.  Orientation: Alert and oriented to person, location and date with somewhat understanding of her condition.  Gait: Not evaluated.  Attitude/Coorperation: Patient presents cooperative and attentive.  Behavior: No episode of agitation reported.  Patient sleeping most of the time.  Speech: Soft low-tone speech.  Patient noticeably less clear with her speech today.  Mood: Patient today admitted that she has been feeling depressed and anxious and became tearful.  Affect: Patient demonstrated increased anxiety with family around?    Thought process: Limited thought process and poverty of speech.  Thought content: Patient who has been expressing her hopelessness denied suicidal ideation or intention today.  Perceptions: Patient denied any auditory or visual hallucination.  Concentration: Grossly impaired  Memory: Grossly impaired  Intellect: Average.  Judgment and Insight: Questionable.     Impression:   Delirium due to another medical condition.  Episodic mood disorder  Agitation    Dissection of ascending aorta (HCC)    NAIMA (acute kidney injury) (HCC)    Stage 3 chronic kidney disease (HCC)    Acute respiratory failure with hypoxia (HCC)    Hypervolemia    Ileus (HCC)      Patient is a 59 year old -American, single female with past medical history of CKD, hypertension, high cholesterol who was admitted to the hospital for aortic dissection. The patient has been demonstrating alternation in mood and cognition with episodes of increased restlessness and agitation. According to team, they are trying to wean patient  of propofol and precedex.     12/13/2024: patient remains on precedex. Continues to have episodes of increased restlessness and agitation. Haldol given this morning.     12/14/2024: The patient has been suffering from severe anxiety, confusion, challenging medical condition and delirious episode caused more paranoia and agitation.  Presents is reasonable approach but stabilizing the mood with antipsychotic medication in the appropriate approach at this point.    12/15/2024: The patient has been demonstrating improvement to the addition of Haldol with decrease in the Precedex.    12/16/2024: The patient continue demonstrating slight improvement with no agitation, no hallucination but some anxiety and slight confusion about her medical condition.  Precedex is 0.4 can be lowered gradually.    12/17/2024: The patient has been demonstrating improvement to her delirious episode and has not been on Precedex, Haldol and did not indicate any medication as needed or fentanyl.  Continue current management and sign off.    12/18/2024: The patient who was in delirious state has been demonstrating improvement in her cognition and orientation and understanding to her medical condition.  Patient has been sinking into a depressed and anxious state with some hopelessness and helplessness.    12/19/2024: The patient received Lexapro yesterday but the patient continued demonstrating GI difficulty and it is more appropriate to switch Lexapro to mirtazapine.    Discussed risk and benefit, acknowledging the current symptom and severity.  At this point, I would recommend the following approach:     Focus on safety  Focus on education and support.  Focus on insight orientation helping the patient understand diagnosis and treatment plan.  Continue Ativan 0.5 mg IV every 6 hours scheduled.  Discontinue Lexapro 5 mg nightly.  Start mirtazapine 7.5 mg via feeding tube  Utilize ativan 1 mg IV q 4 hours PRN for anxiety  Processed with patient at  length, the initiation of the above psychotropic medications I advised the patient of the risks, benefits, alternatives and potential side effects. The patient consents to administration of the medications and understands the right to refuse medications at any time. The patient verbalized understanding.   Coordinate plan with team    Orders This Visit:  Orders Placed This Encounter   Procedures    Troponin I (High Sensitivity)    CBC With Differential With Platelet    Basic Metabolic Panel (8)    Hepatic Function Panel (7)    PTT, Activated    Prothrombin Time (PT)    Pro Beta Natriuretic Peptide    Open heart surgical profile    CBC, Platelet; No Differential    CBC With Differential With Platelet    Prothrombin Time (PT)    PTT, Activated    Fibrinogen Activity    Expanded Arterial Blood Gas    Expanded Arterial Blood Gas    Arterial blood gas    CBC, Platelet; No Differential    Basic Metabolic Panel (8)    Magnesium    Comp Metabolic Panel (14)    Magnesium    Expanded Arterial Blood Gas    Arterial blood gas    CBC, Platelet; No Differential    Magnesium    Renal Function Panel    Urinalysis, Routine    Microalb/Creat Ratio, Random Urine    Comp Metabolic Panel (14)    Magnesium    CBC With Differential With Platelet    Phosphorus    CBC, Platelet; No Differential    Heparin Induced Platelet    Expanded Arterial Blood Gas    Magnesium    Renal Function Panel    Lipase    Amylase    Lactic Acid, Plasma    Basic Metabolic Panel (8)    Potassium    Lipid Panel    Basic Metabolic Panel (8)    CBC, Platelet; No Differential    CBC, Platelet; No Differential    Magnesium    Renal Function Panel    CBC, Platelet; No Differential    Triglycerides    Magnesium    CBC With Differential With Platelet    Renal Function Panel    Manual differential    CBC With Differential With Platelet    Procalcitonin    Manual differential    CBC With Differential With Platelet    Renal Function Panel    Magnesium    Manual differential     REDRAW RENAL    Magnesium    CBC With Differential With Platelet    Comp Metabolic Panel (14)    Iron And Tibc    Phosphorus    Manual differential    Expanded Arterial Blood Gas    Magnesium    CBC With Differential With Platelet    Renal Function Panel    PTT, Activated    Prothrombin Time (PT)    Manual differential    CBC, Platelet; No Differential    Comp Metabolic Panel (14)    Lipid Panel    Expanded Arterial Blood Gas    Basic Metabolic Panel (8)    CBC, Platelet; No Differential    Magnesium    Renal Function Panel    CBC With Differential With Platelet    Magnesium    Renal Function Panel    CBC With Differential With Platelet    Potassium    TSH W Reflex To Free T4    Magnesium    CBC With Differential With Platelet    Renal Function Panel    Scan slide    Magnesium    Phosphorus    CBC With Differential With Platelet    Comp Metabolic Panel (14)    REDRAW CMP (P)    Basic Metabolic Panel (8)    CBC, Platelet; No Differential    Basic Metabolic Panel (8)    CBC, Platelet; No Differential    Basic Metabolic Panel (8)    REDRAW BMP    Potassium    Basic Metabolic Panel (8)    CBC With Differential With Platelet    Scan slide    Potassium    RBC Morphology Scan    Magnesium    Phosphorus    Basic Metabolic Panel (8)    Comp Metabolic Panel (14)    Magnesium    Phosphorus    Triglycerides    Triglycerides    Potassium    CBC, Platelet; No Differential    CBC, Platelet; No Differential    Potassium    Vancomycin Peak, Serum    CBC, Platelet; No Differential    Comp Metabolic Panel (14)    Hemoglobin & Hematocrit    CBC, Platelet; No Differential    Magnesium    Phosphorus    Basic Metabolic Panel (8)    Magnesium    Phosphorus    CBC With Differential With Platelet    Manual differential    Magnesium    CBC With Differential With Platelet    Basic Metabolic Panel (8)    Phosphorus    Manual differential    Basic Metabolic Panel (8)    Magnesium    Phosphorus    CBC With Differential With Platelet    Basic  Metabolic Panel (8)    Magnesium    Phosphorus    CBC With Differential With Platelet    Basic Metabolic Panel (8)    Type and screen    ABORH (Blood Type)    Antibody Screen    ABORH Confirmation    Prepare platelets Once    Prepare fresh frozen plasma Once    Prepare RBC STAT    Prepare cryoprecipitate Once    Type and screen    Prepare RBC Once    ABORH (Blood Type)    Antibody Screen    Type and screen    Prepare RBC STAT    ABORH (Blood Type)    Antibody Screen    Type and screen    Prepare RBC STAT    ABORH (Blood Type)    Antibody Screen    Specimen to Pathology Tissue    Rapid SARS-CoV-2 by PCR    MRSA Screen by PCR    Sputum culture    Sputum culture       Meds This Visit:  Requested Prescriptions      No prescriptions requested or ordered in this encounter         Armen Dudley MD  12/19/2024    Note to Patient: The 21st Century Cures Act makes medical notes like these available to patients in the interest of transparency. However, be advised this is a medical document. It is intended as peer to peer communication. It is written in medical language and may contain abbreviations or verbiage that are unfamiliar. It may appear blunt or direct. Medical documents are intended to carry relevant information, facts as evident, and the clinical opinion of the practitioner. This note may have been transcribed using a voice dictation system. Voice recognition errors may occur. This should not be taken to alter the content or meaning of this note.           [1]   Allergies  Allergen Reactions    Atorvastatin MYALGIA    Pravastatin MYALGIA    Rosuvastatin MYALGIA    Seasonal Runny nose

## 2024-12-20 NOTE — PROGRESS NOTES
[facility]  part of [facility]    Cardiology Progress Note    Alisha Wilde Patient Status:  Inpatient    10/25/1965 MRN [id number]   Location [facility] 2W/SW Attending Aguila Villegas MD   Hosp Day # 28 PCP Bridger Avendano MD     Interval History   BP has improved, still requiring low doses of labetalol gtt    Assessment and Plan:   Acute hypoxic respiratory failure  Multiple aspiration events, emesis  Aspiration PNA  Suspected ileus  Type A aortic dissection  NAIMA on CKD-III, improved  Obesity  EtOH abuse  -presented with acute onset CP   -CT with type A aortic dissection above right coronary artery and extending distally into the left common iliac artery and external iliac   -s/p emergent successful repair on 24  -course complicated by failure to extubated resulting in Trach/PEG tube placement and multiple aspiration events associated with ileus    -CXR on  with likely aspiration PNA   -on scheduled Reglan per GI    Hypertension  -TTE on  with hyperdynamic LVEF  -resumed oral antihypertensive therapies  -continue coreg 37.5 mg BID  -continue combination hydralazine + isosorbide 150mg - 40mg TID  -continue amlodipine 10 mg daily  -continue clonidine 0.3 mg/hr patch  -holding RAAS inhibition due to NAIMA, appreciate nephrology assistance  -wean labetalol gtt, goal BP is less than 140/80    Volume overload  HFpEF  -remains significantly overloaded on exam  -now with uptrend in serum creatinine appreciate nephrology assistance    Objective:   Patient Vitals for the past 24 hrs:   BP Temp Temp src Pulse Resp SpO2 Weight   24 1500 119/66 -- -- 70 18 92 % --   24 1400 124/66 -- -- 71 18 95 % --   24 1300 122/66 -- -- 68 18 97 % --   24 1200 124/68 98.4 °F (36.9 °C) Axillary 68 18 97 % --   24 1100 127/75 -- -- 69 18 98 % --   24 1000 129/71 -- -- 69 18 97 % --   24 0900 128/69 -- -- 70 18 97 % --   24 0800 (!) 143/118 97.7  °F (36.5 °C) Temporal 70 18 98 % --   11/26/24 0700 130/70 -- -- 69 18 99 % --   11/26/24 0621 -- -- -- -- -- -- 282 lb 3 oz (128 kg)   11/26/24 0600 95/53 -- -- 68 21 97 % --   11/26/24 0500 121/67 -- -- 70 18 97 % --   11/26/24 0400 119/68 98.4 °F (36.9 °C) Temporal 72 18 98 % --   11/26/24 0200 109/60 -- -- 72 21 96 % --   11/26/24 0130 -- -- -- 72 18 97 % --   11/26/24 0100 142/73 -- -- 71 18 98 % --   11/26/24 0030 -- -- -- 71 18 99 % --   11/26/24 0000 139/70 98.3 °F (36.8 °C) Temporal 70 18 98 % --   11/25/24 2300 130/69 -- -- 72 18 98 % --   11/25/24 2200 135/70 -- -- 71 18 98 % --   11/25/24 2100 138/71 -- -- 71 18 99 % --   11/25/24 2000 132/68 98.1 °F (36.7 °C) Temporal 69 18 99 % --   11/25/24 1900 124/64 -- -- 71 18 98 % --   11/25/24 1800 136/72 -- -- 71 18 98 % --   11/25/24 1700 138/70 -- -- 70 18 98 % --       Intake/Output:   Last 3 shifts:   Intake/Output                   11/24/24 0700 - 11/25/24 0659 11/25/24 0700 - 11/26/24 0659 11/26/24 0700 - 11/27/24 0659       Intake    P.O.  0  0  --    P.O. 0 0 --    I.V.  2276.7  1884.9  --    I.V. 154 615 --    Volume (mL) Insulin 53.9 37 --    Volume (mL) Nitroglycerin 236.9 54.5 --    Volume (mL) Dobutamine 4.5 -- --    Volume (mL) Propofol 477.8 713.9 --    Volume (mL) Dexmedetomidine 352.2 384.5 --    Volume (mL) (dextrose 5%-sodium chloride 0.45% infusion) 997.4 80 --    NG/GT  50  150  60    Intake (mL) (NG/OG Tube Orogastric 16 Fr. Right mouth) 50 150 60    IV PIGGYBACK  600  500  --    Volume (mL) (piperacillin-tazobactam (Zosyn) 3.375 g in dextrose 5% 100 mL IVPB-ADDV) 300 200 --    Volume (mL) (acetaminophen (Ofirmev) 10 mg/mL infusion premix 1,000 mg) 200 100 --    Volume (mL) (potassium chloride 20 mEq/100mL IVPB premix 20 mEq) -- 100 --    Volume (mL) (potassium chloride 40 mEq/100mL IVPB premix (central line) 40 mEq) 100 100 --    Total Intake 2926.7 2534.9 60       Output    Urine  1800  3570  800    Output (mL) (Urethral Catheter  Latex;Temperature probe;Double-lumen) 1800 3570 800    Emesis/NG output  550  365  --    Residual volume (ml) (NG/OG Tube Orogastric 16 Fr. Right mouth) -- 5 --    Output (mL) (NG/OG Tube Orogastric 16 Fr. Right mouth) 550 360 --    Chest Tube  188  125  30    Output (mL) ([REMOVED] Chest Tube Anterior Mediastinal 32 Fr.) 48 50 --    Output (mL) (Chest Tube Anterior Pericardial 24 Fr.) 140 75 30    Total Output 2538 4060 830       Net I/O     388.7 -1525.1 -770             Vent Settings: Vent Mode: PRVC/AC  FiO2 (%):  [30 %] 30 %  S RR:  [18] 18  S VT:  [550 mL] 550 mL  PEEP/CPAP (cm H2O):  [5 cm H20] 5 cm H20  MAP (cm H2O):  [7-16] 11    Hemodynamic parameters (last 24 hours):      Scheduled Meds:    metoclopramide  5 mg Intravenous Q6H    collagenase   Topical Daily    pantoprazole  40 mg Intravenous Q12H    mirtazapine  7.5 mg Oral Nightly    furosemide  40 mg Intravenous BID (Diuretic)    LORazepam  0.5 mg Intravenous QID    cloNIDine  1 patch Transdermal Weekly    carvedilol  37.5 mg Oral BID with meals    aspirin  81 mg Peg Tube Daily    isosorbide dinitrate  40 mg Per NG Tube TID (Nitrates)    hydrALAZINE  150 mg Oral Q8H GABRIEL    amLODIPine  10 mg Oral Daily    heparin  5,000 Units Subcutaneous Q8H GABRIEL    chlorhexidine gluconate  15 mL Mouth/Throat BID       Continuous Infusions:    adult 3 in 1 TPN      adult 3 in 1 TPN 83.3 mL/hr at 12/19/24 2126    dextrose 10%      labetalol (Trandate) 400 mg in sodium chloride 0.9% 200 mL infusion Stopped (12/20/24 1208)    dextrose 10%         Results:     Lab Results   Component Value Date    WBC 9.9 12/20/2024    HGB 8.2 (L) 12/20/2024    HCT 25.2 (L) 12/20/2024    .0 12/20/2024    CREATSERUM 1.48 (H) 12/20/2024    BUN 27 (H) 12/20/2024     12/20/2024    K 4.2 12/20/2024     12/20/2024    CO2 26.0 12/20/2024     (H) 12/20/2024    CA 9.3 12/20/2024    ALB 3.2 12/15/2024    ALKPHO 68 12/15/2024    BILT 0.6 12/15/2024    TP 5.7 12/15/2024    AST  25 12/15/2024    ALT 23 12/15/2024    PTT 30.7 12/04/2024    INR 1.17 12/04/2024    TSH 2.085 12/07/2024    NATHALIE 99 11/25/2024    LIP 28 11/25/2024    DDIMER 0.42 12/08/2019    ESRML 15 06/05/2021    MG 2.2 12/20/2024    PHOS 5.0 12/20/2024    TROP <0.045 12/08/2019     (H) 09/23/2021    B12 1,156 (H) 07/23/2018       Recent Labs   Lab 12/18/24  0409 12/19/24  0358 12/20/24  0626   * 139* 133*   BUN 20 21 27*   CREATSERUM 1.12* 1.25* 1.48*   CA 9.0 9.0 9.3    140 138   K 4.0 4.1 4.2    106 104   CO2 26.0 27.0 26.0     Recent Labs   Lab 12/18/24  0409 12/19/24  0358 12/20/24  0626   RBC 3.10* 3.00* 2.96*   HGB 8.1* 8.0* 8.2*   HCT 25.5* 25.2* 25.2*   MCV 82.3 84.0 85.1   MCH 26.1 26.7 27.7   MCHC 31.8 31.7 32.5   RDW 19.2* 19.1* 19.6*   NEPRELIM 6.96 7.58 7.66   WBC 10.5 10.4 9.9   .0 203.0 203.0       No results for input(s): \"BNPML\" in the last 168 hours.    No results for input(s): \"TROP\", \"CK\" in the last 168 hours.    XR CHEST AP PORTABLE  (CPT=71045)    Result Date: 12/19/2024  CONCLUSION:  1. Cardiomegaly.  Tortuous aorta. 2. Mild pulmonary interstitial prominence noted throughout. 3. Superimposed right basilar and left upper lobe alveolitis has improved. 4. Support tubes and catheters satisfactory.     Dictated by (CST): Woodrow Fischer MD on 12/19/2024 at 8:06 AM     Finalized by (CST): Woodrow Fischer MD on 12/19/2024 at 8:10 AM          XR ABDOMEN (1 VIEW) (CPT=74018)    Result Date: 12/18/2024  CONCLUSION:  1. Mild residual small bowel dilatation left mid abdomen measuring 4.1 cm has diminished.  Findings may reflect nonspecific small-bowel enteropathy, small-bowel ileus versus less likely a small bowel obstruction.    Dictated by (CST): Woodrow Fischer MD on 12/18/2024 at 1:26 PM     Finalized by (CST): Woodrow Fischer MD on 12/18/2024 at 1:29 PM         EKG 12 Lead    Result Date: 12/19/2024  Normal sinus rhythm Nonspecific T wave abnormality Abnormal ECG  When compared with ECG of 12-DEC-2024 13:34, ST no longer depressed in Inferior leads Confirmed by JENNIFFER CHACKO ([id number]) on 12/19/2024 10:10:34 AM          Exam:     Physical Exam:  General: awake/alert  HEENT: +trach  Neck: No JVD, carotids 2+, no bruits.  Cardiac: Regular rate and rhythm. S1, S2 normal.   Lungs: +rhonchi   Abdomen: Soft, non-tender.BS-present.  Extremities: Without clubbing or cyanosis. + BLE edema.    Neurologic: unable to obtain  Skin: Warm and dry.     Edward Montgomery, [facility] [place]   decreased ROM

## 2024-12-20 NOTE — PROGRESS NOTES
[facility]  part of [facility]    Progress Note    Alisha Wilde Patient Status:  Inpatient    10/25/1965 MRN [id number]   Location [facility] 2W/SW Attending Stefani Hsu MD   Hosp Day # 28 PCP Bridger Avendano MD     Subjective:  In bed on exam, awake/alert, calm and appropriate.  No acute events noted overnight.  Remains off of Precedex over 48 hours.  No visitors at bedside currently    Objective:  /75   Pulse 86   Temp 98.5 °F (36.9 °C) (Oral)   Resp 18   Ht 5' 5\" (1.651 m)   Wt 285 lb 8 oz (129.5 kg)   SpO2 99%   BMI 47.51 kg/m²     Temp (24hrs), Av.9 °F (36.6 °C), Min:97 °F (36.1 °C), Max:99 °F (37.2 °C)  Telemetry-NSR    Intake/Output:    Intake/Output Summary (Last 24 hours) at 2024 1025  Last data filed at 2024 0800  Gross per 24 hour   Intake 3694.2 ml   Output 2655 ml   Net 1039.2 ml       Wt Readings from Last 3 Encounters:   24 285 lb 8 oz (129.5 kg)   10/24/24 254 lb (115.2 kg)   24 249 lb (112.9 kg)       Allergies:  Allergies[1]    Labs:  Lab Results   Component Value Date    WBC 9.9 2024    HGB 8.2 2024    HCT 25.2 2024    .0 2024    CREATSERUM 1.48 2024    BUN 27 2024     2024    K 4.2 2024     2024    CO2 26.0 2024     2024    CA 9.3 2024    MG 2.2 2024    PHOS 5.0 2024       Physical Exam:  Blood pressure 105/75, pulse 86, temperature 98.5 °F (36.9 °C), temperature source Oral, resp. rate 18, height 5' 5\" (1.651 m), weight 285 lb 8 oz (129.5 kg), SpO2 99%, not currently breastfeeding.  General: NAD  Neck: Unable to assess due to body habitus/ + trach  Lungs: Few scattered rhonchi bilaterally, diminished laterally  Heart: RRR, S1, S2  Abdomen: Soft, NT/ND, BS+x4, PEG, +stool  Extremities: Warm, dry, 1+ generalized edema-steadily improving  Pulses: 1-2+ bilat DP  Skin: sternotomy stable, incision CDI.  L groin  Mepilex intact-see wound care for details  Neurological:  AAOx3, MAEW, calm/cooperative    Assessment/Plan:  POD #28 s/p emergent aortic dissection repair  Remains hemodynamically stable for cardiac standpoint.  Hypertension management per cardiology, wean off labetalol to maintain SBP less than 140.  Vent management per critical care/pulmonary -remains on FiO2 30%/trach  PEG-meds/trickle feeds started today, management per GI.  Wean off TPN once tolerating goal TF  Webb removed yesterday-straight cath x 2 thus far, if continues to retain, replace Webb tomorrow.    CV surgery to follow follow peripherally now, notify us if any issues with wounds.  Multiple medical issues managed by specialists/hospitalist-plan for DC to LTAC once stable off labetalol.  Appreciate CM/SW to assist with DC planning    Katiuska Randle, APRN  12/20/2024  10:25 AM       [1]   Allergies  Allergen Reactions    Atorvastatin MYALGIA    Pravastatin MYALGIA    Rosuvastatin MYALGIA    Seasonal Runny nose

## 2024-12-20 NOTE — PLAN OF CARE
Straight cath x1. Wound care consulted. C-diff sample sent. Few small Bms overnight.   Problem: Patient Centered Care  Goal: Patient preferences are identified and integrated in the patient's plan of care  Description: Interventions:  - What would you like us to know as we care for you? I work at [employer] and I am still very good friends with people from grade school! My brother, Osbaldo Prince, has been making decisions for me.  - Provide timely, complete, and accurate information to patient/family  - Incorporate patient and family knowledge, values, beliefs, and cultural backgrounds into the planning and delivery of care  - Encourage patient/family to participate in care and decision-making at the level they choose  - Honor patient and family perspectives and choices  Outcome: Progressing     Problem: Diabetes/Glucose Control  Goal: Glucose maintained within prescribed range  Description: INTERVENTIONS:  - Monitor Blood Glucose as ordered  - Assess for signs and symptoms of hyperglycemia and hypoglycemia  - Administer ordered medications to maintain glucose within target range  - Assess barriers to adequate nutritional intake and initiate nutrition consult as needed  - Instruct patient on self management of diabetes  Outcome: Progressing     Problem: Patient/Family Goals  Goal: Patient/Family Long Term Goal  Description: Patient's Long Term Goal: To discharge from the hospital safely    Interventions:  - surgical interventions, rounding, medications  - See additional Care Plan goals for specific interventions  Outcome: Progressing  Goal: Patient/Family Short Term Goal  Description: Patient's Short Term Goal: to breathe better    Interventions:   - manage the ventilator for pt until she is able to breath on her own.  - See additional Care Plan goals for specific interventions  Outcome: Progressing     Problem: CARDIOVASCULAR - ADULT  Goal: Maintains optimal cardiac output and hemodynamic  stability  Description: INTERVENTIONS:  - Monitor vital signs, rhythm, and trends  - Monitor for bleeding, hypotension and signs of decreased cardiac output  - Evaluate effectiveness of vasoactive medications to optimize hemodynamic stability  - Monitor arterial and/or venous puncture sites for bleeding and/or hematoma  - Assess quality of pulses, skin color and temperature  - Assess for signs of decreased coronary artery perfusion - ex. Angina  - Evaluate fluid balance, assess for edema, trend weights  Outcome: Progressing  Goal: Absence of cardiac arrhythmias or at baseline  Description: INTERVENTIONS:  - Continuous cardiac monitoring, monitor vital signs, obtain 12 lead EKG if indicated  - Evaluate effectiveness of antiarrhythmic and heart rate control medications as ordered  - Initiate emergency measures for life threatening arrhythmias  - Monitor electrolytes and administer replacement therapy as ordered  Outcome: Progressing     Problem: RESPIRATORY - ADULT  Goal: Achieves optimal ventilation and oxygenation  Description: INTERVENTIONS:  - Assess for changes in respiratory status  - Assess for changes in mentation and behavior  - Position to facilitate oxygenation and minimize respiratory effort  - Oxygen supplementation based on oxygen saturation or ABGs  - Provide Smoking Cessation handout, if applicable  - Encourage broncho-pulmonary hygiene including cough, deep breathe, Incentive Spirometry  - Assess the need for suctioning and perform as needed  - Assess and instruct to report SOB or any respiratory difficulty  - Respiratory Therapy support as indicated  - Manage/alleviate anxiety  - Monitor for signs/symptoms of CO2 retention  Outcome: Progressing     Problem: GASTROINTESTINAL - ADULT  Goal: Minimal or absence of nausea and vomiting  Description: INTERVENTIONS:  - Maintain adequate hydration with IV or PO as ordered and tolerated  - Nasogastric tube to low intermittent suction as ordered  - Evaluate  effectiveness of ordered antiemetic medications  - Provide nonpharmacologic comfort measures as appropriate  - Advance diet as tolerated, if ordered  - Obtain nutritional consult as needed  - Evaluate fluid balance  Outcome: Progressing  Goal: Maintains or returns to baseline bowel function  Description: INTERVENTIONS:  - Assess bowel function  - Maintain adequate hydration with IV or PO as ordered and tolerated  - Evaluate effectiveness of GI medications  - Encourage mobilization and activity  - Obtain nutritional consult as needed  - Establish a toileting routine/schedule  - Consider collaborating with pharmacy to review patient's medication profile  Outcome: Progressing     Problem: METABOLIC/FLUID AND ELECTROLYTES - ADULT  Goal: Glucose maintained within prescribed range  Description: INTERVENTIONS:  - Monitor Blood Glucose as ordered  - Assess for signs and symptoms of hyperglycemia and hypoglycemia  - Administer ordered medications to maintain glucose within target range  - Assess barriers to adequate nutritional intake and initiate nutrition consult as needed  - Instruct patient on self management of diabetes  Outcome: Progressing  Goal: Electrolytes maintained within normal limits  Description: INTERVENTIONS:  - Monitor labs and rhythm and assess patient for signs and symptoms of electrolyte imbalances  - Administer electrolyte replacement as ordered  - Monitor response to electrolyte replacements, including rhythm and repeat lab results as appropriate  - Fluid restriction as ordered  - Instruct patient on fluid and nutrition restrictions as appropriate  Outcome: Progressing  Goal: Hemodynamic stability and optimal renal function maintained  Description: INTERVENTIONS:  - Monitor labs and assess for signs and symptoms of volume excess or deficit  - Monitor intake, output and patient weight  - Monitor urine specific gravity, serum osmolarity and serum sodium as indicated or ordered  - Monitor response to  interventions for patient's volume status, including labs, urine output, blood pressure (other measures as available)  - Encourage oral intake as appropriate  - Instruct patient on fluid and nutrition restrictions as appropriate  Outcome: Progressing     Problem: SKIN/TISSUE INTEGRITY - ADULT  Goal: Skin integrity remains intact  Description: INTERVENTIONS  - Assess and document risk factors for pressure ulcer development  - Assess and document skin integrity  - Monitor for areas of redness and/or skin breakdown  - Initiate interventions, skin care algorithm/standards of care as needed  Outcome: Progressing  Goal: Incision(s), wounds(s) or drain site(s) healing without S/S of infection  Description: INTERVENTIONS:  - Assess and document risk factors for pressure ulcer development  - Assess and document skin integrity  - Assess and document dressing/incision, wound bed, drain sites and surrounding tissue  - Implement wound care per orders  - Initiate isolation precautions as appropriate  - Initiate Pressure Ulcer prevention bundle as indicated  Outcome: Progressing  Goal: Oral mucous membranes remain intact  Description: INTERVENTIONS  - Assess oral mucosa and hygiene practices  - Implement preventative oral hygiene regimen  - Implement oral medicated treatments as ordered  Outcome: Progressing     Problem: HEMATOLOGIC - ADULT  Goal: Maintains hematologic stability  Description: INTERVENTIONS  - Assess for signs and symptoms of bleeding or hemorrhage  - Monitor labs and vital signs for trends  - Administer supportive blood products/factors, fluids and medications as ordered and appropriate  - Administer supportive blood products/factors as ordered and appropriate  Outcome: Progressing     Problem: MUSCULOSKELETAL - ADULT  Goal: Return mobility to safest level of function  Description: INTERVENTIONS:  - Assess patient stability and activity tolerance for standing, transferring and ambulating w/ or w/o assistive  devices  - Assist with transfers and ambulation using safe patient handling equipment as needed  - Ensure adequate protection for wounds/incisions during mobilization  - Obtain PT/OT consults as needed  - Advance activity as appropriate  - Communicate ordered activity level and limitations with patient/family  Outcome: Progressing  Goal: Maintain proper alignment of affected body part  Description: INTERVENTIONS:  - Support and protect limb and body alignment per provider's orders  - Instruct and reinforce with patient and family use of appropriate assistive device and precautions (e.g. spinal or hip dislocation precautions)  Outcome: Progressing     Problem: NEUROLOGICAL - ADULT  Goal: Achieves stable or improved neurological status  Description: INTERVENTIONS  - Assess for and report changes in neurological status  - Initiate measures to prevent increased intracranial pressure  - Maintain blood pressure and fluid volume within ordered parameters to optimize cerebral perfusion and minimize risk of hemorrhage  - Monitor temperature, glucose, and sodium. Initiate appropriate interventions as ordered  Outcome: Progressing     Problem: PAIN - ADULT  Goal: Verbalizes/displays adequate comfort level or patient's stated pain goal  Description: INTERVENTIONS:  - Encourage pt to monitor pain and request assistance  - Assess pain using appropriate pain scale  - Administer analgesics based on type and severity of pain and evaluate response  - Implement non-pharmacological measures as appropriate and evaluate response  - Consider cultural and social influences on pain and pain management  - Manage/alleviate anxiety  - Utilize distraction and/or relaxation techniques  - Monitor for opioid side effects  - Notify MD/LIP if interventions unsuccessful or patient reports new pain  - Anticipate increased pain with activity and pre-medicate as appropriate  Outcome: Progressing     Problem: Delirium  Goal: Minimize duration of  delirium  Description: Interventions:  - Encourage use of hearing aids, eye glasses  - Promote highest level of mobility daily  - Provide frequent reorientation  - Promote wakefulness i.e. lights on, blinds open  - Promote sleep, encourage patient's normal rest cycle i.e. lights off, TV off, minimize noise and interruptions  - Encourage family to assist in orientation and promotion of home routines  Outcome: Progressing

## 2024-12-20 NOTE — PROGRESS NOTES
Pulmonary/ICU/Critical Care Progress Note        Reason for Consultation: post op care  Referring Physician: Dr. Gregg        SUBJECTIVE:  Afebrile remains on the vent. Tolerated SBT for 2 hrs yesterday  On labetolol  Had emesis after getting oral meds yesterday. More episodes afterwards.  Has burning sensation in esophagus/chest. Denies nausea.  Having multiple BMs.  Retaining urine after aguilar removed.       ALLERGIES:  Allergies[1]        MEDS:  Home Medications:  Medications Taking[2]      Scheduled Medication:   metoclopramide  5 mg Intravenous Q6H    collagenase   Topical Daily    pantoprazole  40 mg Intravenous Q12H    mirtazapine  7.5 mg Oral Nightly    furosemide  40 mg Intravenous BID (Diuretic)    LORazepam  0.5 mg Intravenous QID    cloNIDine  1 patch Transdermal Weekly    carvedilol  37.5 mg Oral BID with meals    aspirin  81 mg Peg Tube Daily    isosorbide dinitrate  40 mg Per NG Tube TID (Nitrates)    hydrALAZINE  150 mg Oral Q8H GABRIEL    amLODIPine  10 mg Oral Daily    heparin  5,000 Units Subcutaneous Q8H GABRIEL    chlorhexidine gluconate  15 mL Mouth/Throat BID     Continuous Infusing Medication:   adult 3 in 1 TPN 83.3 mL/hr at 12/19/24 2126    dextrose 10%      labetalol (Trandate) 400 mg in sodium chloride 0.9% 200 mL infusion 1 mg/min (12/19/24 2248)    dextrose 10%      niCARdipine Stopped (12/18/24 1800)     PRN Medications:    ondansetron    dextrose 10%    lipase-protease-amylase (Lip-Prot-Amyl) **AND** sodium bicarbonate    LORazepam    dextrose 10%    fentaNYL    fentaNYL    bisacodyl    acetaminophen    sodium chloride    albuterol    hydrALAzine    acetaminophen    ipratropium-albuterol    HYDROcodone-acetaminophen    melatonin    potassium chloride **OR** potassium chloride    magnesium sulfate in dextrose 5%    magnesium sulfate in sterile water for injection       PHYSICAL EXAM:  /67   Pulse 83   Temp 97.2 °F (36.2 °C) (Temporal)   Resp 20   Ht 5' 5\" (1.651 m)   Wt 285 lb 8 oz  (129.5 kg)   SpO2 100%   BMI 47.51 kg/m²   Vent Mode: PRVC/AC  FiO2 (%):  [30 %] 30 %  S RR:  [18] 18  S VT:  [550 mL] 550 mL  PEEP/CPAP (cm H2O):  [5 cm H20] 5 cm H20  MAP (cm H2O):  [7-16] 11  CONSTITUTIONAL: intubated awake   HEENT: atraumatic normocephalic  MOUTH: MMM  NECK/THROAT: no JVD. Trachea midline. No obvious thyromegaly. + trach with min bleeding  LUNG: clearer BS b/l no wheezing, + crackles. Diminished at bases. Chest symmetric with respiratory motion. + midsternal surgical wound healing   HEART: regular rate and rhythm, no obvious murmers or gallops noted  ABD: soft non distended not tender. + bowel sounds. No organomegaly noted. + PEG tube   EXT: no clubbing, cyanosis, or edema noted. Pulses intact grossly      IMAGES:   CXR 12/19/24  CONCLUSION:  1. Cardiomegaly.  Tortuous aorta.   2. Mild pulmonary interstitial prominence noted throughout.   3. Superimposed right basilar and left upper lobe alveolitis has improved.   4. Support tubes and catheters satisfactory.      KUB 12/18/24  1. Mild residual small bowel dilatation left mid abdomen measuring 4.1 cm has diminished.  Findings may reflect nonspecific small-bowel enteropathy, small-bowel ileus versus less likely a small bowel obstruction.     KUB 12/16/24  CONCLUSION: Nonspecific-nonobstructive bowel gas pattern.  Dilated bowel loops demonstrated on preceding exam is not redemonstrated.     CXR 12/16/24  CONCLUSION: Right basal pulmonary opacity suggesting pneumonia.  No significant interval change since most recent exam from 12/15/24.  Findings are worse since 12/12/24.     CXR 12/15/24  On my read RLL infiltrates more dense but less pulm edema  CARDIAC/MEDIAST: Heart and mediastinum are unchanged with previous median sternotomy.   LUNGS/PLEURA: Right greater left patchy bilateral lung opacity which may be mildly increased in the right lung base.  No significant pleural effusion.  No pneumothorax.   OTHER: Stable appearance of the osseous  structures.  Tracheostomy tube and right PICC line also unchanged.     KUB 12/14/24  No change in dilation of SB  Significantly limited examination.   Ongoing dilation of small bowel loops up to 4.7 cm, previously 4.5 cm. Recommend continued radiographic/clinical follow-up.       CXR 12/12/24  CONCLUSION:   1. Cardiomegaly.  Tortuous aorta.   2. Bilateral mixed multifocal airspace consolidation with slight improved aeration left lung base.   3. Tracheostomy tube overlies the tracheal air column.  Right PICC line with the tip in the SVC.      KUB 12/12/24  CONCLUSION:   1. Nonspecific small bowel dilatation left mid abdomen and central abdomen measures up to 4.5 cm.   2. Mild stool scattered throughout the colon suggests constipation fecal retention.      CT A/P 12/10/24  CONCLUSION:   1. Fluid-filled loops of small bowel, which could reflect underlying infectious/inflammatory enteritis or malabsorption in the appropriate clinical setting. Bowel loops are mildly dilated without clear transition point to suggest mechanical bowel   obstruction, although early or partial bowel obstruction could have a similar appearance.   2. Extensive distal colonic diverticulosis without CT evidence of acute complication.    3. Bladder distension despite Webb catheter placement.   4. Hepatomegaly with hepatic steatosis.   5. Diffuse anasarca.   6. Partially visualized postthoracotomy chest with possible postoperative seroma/hematoma of the anterior chest wall.   7. Bilateral pleural effusions and associated basilar atelectasis, with or without superimposed pneumonia.   8. Additional scattered patchy nodular opacities may reflect infectious process.    9. Low-density appearance of the intracardiac blood pool raises the possibility of underlying anemia. Correlate with hematologic parameters.   10. Lesser incidental findings as above.     CXR 12/9/24  CONCLUSION:   1. Cardiomegaly.  Atherosclerotic aneurysmal thoracic aorta   2.  Tracheostomy tube overlies tracheal air column.   3.  Bilateral mixed alveolar and interstitial multifocal airspace opacification has progressed.        KUB 12/9/24  CONCLUSION:   No huang dilatation of the small bowel to suggest small bowel obstruction.   Large cecal stool burden which may indicate constipation. Mild stool burden throughout the remainder of the colon.     CXR 12/5/24  No significant change when compared to 12/04/2024.     CXR 12/4/24  CONCLUSION:   1. Mild cardiomegaly and minimally prominent pulmonary vascularity but no huang pulmonary edema.  ET tube and NG tubes have been removed.  Tracheostomy is now noted in the trachea extending to the level the clavicles.  No pneumothorax.   2. Persistent patchy bilateral upper lobe airspace disease right more than left suggesting persistent bilateral upper lobe pneumonia.  Correlate clinically and continued follow-up is advised.     CXR 12/2/24  FINDINGS:   POSITION: The patient is semi-erect and slightly rotated to the right.   DEVICES: There is an endotracheal tube terminating approximately 3.9 cm above the jennyfer.  A large-bore right internal jugular central venous vascular access sheath has tip projecting in the SVC. An enteric tube extends caudally beyond the field of view.   CARDIAC/VASC: The cardiomediastinal silhouette is accentuated by AP technique, but likely stably enlarged. There is mild tortuosity of the thoracic aorta with peripheral atherosclerotic vascular calcification. The pulmonary vascularity is within normal   limits.   MEDIAST/HAMLET: Surgical clips are present.   LUNGS/PLEURA: Elevation right hemidiaphragm is noted. Multifocal patchy airspace consolidation is demonstrated, slightly worse on the right side than left. Mild interstitial opacities are seen. Additional scattered reticular opacities may be atelectatic   in nature. No large pleural effusion or pneumothorax is evident.    BONES: Median sternotomy wires are demonstrated. Mild  degenerative changes of the thoracic spine are apparent. There is no fracture or visible bony lesion.   OTHER: There may be surgical clips at the level of the thoracic inlet. Leads overlie the chest and obscure underlying detail     CXR 11/27/24  Moderate pulmonary edema, progressed since 11/26/2024.     CT c/a/p  CONCLUSION:  Low-lying ETT, 0.8 cm above the jennyfer   Multifocal bilateral pulmonary nodular consolidation s    CXR 11/24/24  FINDINGS:   CARDIAC/VASC: The cardiac silhouette is exaggerated by AP portable technique. There is stable central pulmonary venous congestion.   MEDIAST/HAMLET:   No visible mass or adenopathy.   LUNGS/PLEURA: There are stable streaky opacities at the right lung base.  No pleural effusion or pneumothorax.   BONES: Sternotomy changes are again noted.   OTHER: An endotracheal tube tip projects 3.8 cm above the jennyfer.  An enteric tube again courses into the left upper quadrant.  A right internal jugular approach [place]-Dev catheter tip again projects over the pulmonary outflow tract.  A mediastinal drain tip again projects over the right suprahilar region.     CXR 11/23/24  On my read possible RML infiltrates  CONCLUSION: Post emergent acute type A aortic dissection repair.  Stable cardiomegaly.  Gross stable/satisfactory position of lines and tubes.  Mild pulmonary vascular congestion.       CXR 11/22/24  CONCLUSION: Post feeding tube placement with tip in the distal esophagus approximately 4 cm above the gastroesophageal junction.  Recommend advancement of the tube by approximately 10 cm to place it in the stomach.     CXR 11/22/24  CARDIAC/MEDIASTINUM: The cardiac silhouette is enlarged and unchanged.. There is a right internal jugular [place]-Dev catheter with tip at the level of the main pulmonary artery. There is a right-sided chest tube. Endotracheal tube with tip approximately    5.3 cm above the jennyfer. There is a nasogastric tube with tip and sidehole within the stomach and below  the diaphragm. There are median sternotomy wires and postoperative changes of ascending aortic repair.   LUNGS: There is pulmonary vascular redistribution without edema. Minimal blunting of the bilateral costophrenic angles may be secondary to trace pleural effusions versus chronic pleural thickening. There is mild bibasilar atelectasis. No pneumothorax.   BONES: There is degenerative disease of the thoracic spine.     Chest CTA 11/22/24  CONCLUSION:   1. Type a aortic dissection beginning just above right renal (correction:  Right coronary)  artery and extending distally into the left common iliac artery and external iliac.  Findings were called to Dr. Echavarria at 5:52 p.m.       LABS:  Recent Labs   Lab 12/18/24  0409 12/19/24  0358 12/20/24  0626   RBC 3.10* 3.00* 2.96*   HGB 8.1* 8.0* 8.2*   HCT 25.5* 25.2* 25.2*   MCV 82.3 84.0 85.1   MCH 26.1 26.7 27.7   MCHC 31.8 31.7 32.5   RDW 19.2* 19.1* 19.6*   NEPRELIM 6.96 7.58 7.66   WBC 10.5 10.4 9.9   .0 203.0 203.0       Recent Labs   Lab 12/15/24  0445 12/16/24  0508 12/18/24  0409 12/19/24  0358 12/20/24  0626   *  160*   < > 162* 139* 133*   BUN 27*  27*   < > 20 21 27*   CREATSERUM 1.24*  1.24*   < > 1.12* 1.25* 1.48*   EGFRCR 50*  50*   < > 56* 49* 40*   CA 8.5*  8.5*   < > 9.0 9.0 9.3   ALB 3.2  --   --   --   --    *  146*   < > 140 140 138   K 4.6  4.6   < > 4.0 4.1 4.2   *  116*   < > 105 106 104   CO2 23.0  23.0   < > 26.0 27.0 26.0   ALKPHO 68  --   --   --   --    AST 25  --   --   --   --    ALT 23  --   --   --   --    BILT 0.6  --   --   --   --    TP 5.7  --   --   --   --     < > = values in this interval not displayed.       ASSESSMENT/PLAN:  Type A aortic dissection s/p repair now intubated in ICU  -on labetalol gtt. Nicardipine stopped 12/18/24  -multiple oral antiHTN meds for BP now starting to be given since GI recommended start trickle feeds  -s/p pRBC transfusion 12/14/24 for anemia. No obvious s/s  bleeding    Post op acute respiratory failure  -complicated by extubation and reimergent intubation and significant emesis concerning for aspiration PNA vs pneumonitis  -started on zosyn-completed initial 10 day course  -checked ETT asp-candida likely contaminant  -failed multiple SBTs d/t increased RR, work of breathing, hypertension, hypoxemia, significant thick secretions  -remains MV dependent   -hx of likely TANYA and previous smoking hx. Has sign dense consolidations and atelectasis on chest CT  -s/p trach by ENT on 12/4/24 and PEG by GI on 12/5/24  -started trach/vent weaning as able-limited by HTN and  GI issues with recurrent emesis and persistent ileus  -PRN ABG and CXR  -saline nebs  -weaned off precedex  -psych consult for assistance with sedation and agitation-defer to their recs  -on scheduled clonidine patch in attempt to wean off precedex  -restarted on zosyn for recurrent aspiration-complete another 7 day course   -daily SBT and attempt to wean from the vent. Tolerated 2 hrs on 12/19/24    Previous hx of smoking and ETOH  -continue duonebs PRN    NAIMA on CKD and metabolic acidosis  -likely from surgery, and acute issues  -monitor UO and renal labs  -renal following   -diuresis as per cardiology and renal    Abd pain  -s/p abd CT as above  -not tolerating TF now  -PEG tube to LIS  -recurrent aspiration   -GI following-recommend trickle feeds    Proph:  -DVT: hep sq    Dispo:  -full code  -SW/ to assist with LTAC placement    Critical care time 30 min independent of procedures      Thank you for the opportunity to care for Alisha Rainey DO, MPH  Pulmonary Critical Care Medicine  [facility]                       [1]   Allergies  Allergen Reactions    Atorvastatin MYALGIA    Pravastatin MYALGIA    Rosuvastatin MYALGIA    Seasonal Runny nose   [2]   Outpatient Medications Marked as Taking for the 11/21/24 encounter ([facility]  Encounter)   Medication Sig Dispense Refill    Acetaminophen ER (TYLENOL 8 HOUR) 650 MG Oral Tab CR Take 2 tablets (1,300 mg total) by mouth daily as needed.      Calcium Carb-Cholecalciferol 600-10 MG-MCG Oral Tab Take 1 tablet by mouth daily.      metoprolol succinate ER 50 MG Oral Tablet 24 Hr Take 1 tablet (50 mg total) by mouth daily. 90 tablet 3    Losartan Potassium-HCTZ 100-12.5 MG Oral Tab Take 1 tablet by mouth daily. 90 tablet 3    Potassium Chloride ER 20 MEQ Oral Tab CR Take 1 tablet by mouth daily. 90 tablet 3    albuterol 108 (90 Base) MCG/ACT Inhalation Aero Soln Inhale 2 puffs into the lungs every 4 (four) hours as needed for Wheezing. 3 each 3    amLODIPine 10 MG Oral Tab Take 1 tablet (10 mg total) by mouth daily. 90 tablet 3    Ascorbic Acid (VITAMIN C) 1000 MG Oral Tab Take 1 tablet (1,000 mg total) by mouth daily.      vitamin B-12 50 MCG Oral Tab Take 1 tablet (50 mcg total) by mouth daily.

## 2024-12-20 NOTE — PAYOR COMM NOTE
--------------  12/20 CONTINUED STAY REVIEW    Payor: [employer]/H[employer]/POS/EPO  Subscriber #:  [id number]  Authorization Number: [id number]    REMAINS VENTED IN ICU    PULM:    SUBJECTIVE:  Afebrile remains on the vent. Tolerated SBT for 2 hrs yesterday  On labetolol  Had emesis after getting oral meds yesterday. More episodes afterwards.  Has burning sensation in esophagus/chest. Denies nausea.  Having multiple BMs.  Retaining urine after aguilar removed.    Scheduled Medications    metoclopramide  5 mg Intravenous Q6H    collagenase   Topical Daily    pantoprazole  40 mg Intravenous Q12H    mirtazapine  7.5 mg Oral Nightly    furosemide  40 mg Intravenous BID (Diuretic)    LORazepam  0.5 mg Intravenous QID    cloNIDine  1 patch Transdermal Weekly    carvedilol  37.5 mg Oral BID with meals    aspirin  81 mg Peg Tube Daily    isosorbide dinitrate  40 mg Per NG Tube TID (Nitrates)    hydrALAZINE  150 mg Oral Q8H GABRIEL    amLODIPine  10 mg Oral Daily    heparin  5,000 Units Subcutaneous Q8H GABRIEL    chlorhexidine gluconate  15 mL Mouth/Throat BID         Continuous Infusing Medication:  Medication Infusions    adult 3 in 1 TPN 83.3 mL/hr at 12/19/24 2126    dextrose 10%      labetalol (Trandate) 400 mg in sodium chloride 0.9% 200 mL infusion 1 mg/min (12/19/24 2248)    dextrose 10%      niCARdipine Stopped (12/18/24 1800)         PHYSICAL EXAM:  /67   Pulse 83   Temp 97.2 °F (36.2 °C) (Temporal)   Resp 20   Ht 5' 5\" (1.651 m)   Wt 285 lb 8 oz (129.5 kg)   SpO2 100%   BMI 47.51 kg/m²   Vent Mode: PRVC/AC  FiO2 (%):  [30 %] 30 %  S RR:  [18] 18  S VT:  [550 mL] 550 mL  PEEP/CPAP (cm H2O):  [5 cm H20] 5 cm H20  MAP (cm H2O):  [7-16] 11  CONSTITUTIONAL: intubated awake   HEENT: atraumatic normocephalic  MOUTH: MMM  NECK/THROAT: no JVD. Trachea midline. No obvious thyromegaly. + trach with min bleeding  LUNG: clearer BS b/l no wheezing, + crackles. Diminished at bases. Chest symmetric with respiratory  motion. + midsternal surgical wound healing   HEART: regular rate and rhythm, no obvious murmers or gallops noted  ABD: soft non distended not tender. + bowel sounds. No organomegaly noted. + PEG tube   EXT: no clubbing, cyanosis, or edema noted. Pulses intact grossly         ASSESSMENT/PLAN:  Type A aortic dissection s/p repair now intubated in ICU  -on labetalol gtt. Nicardipine stopped 12/18/24  -multiple oral antiHTN meds for BP now starting to be given since GI recommended start trickle feeds  -s/p pRBC transfusion 12/14/24 for anemia. No obvious s/s bleeding     Post op acute respiratory failure  -complicated by extubation and reimergent intubation and significant emesis concerning for aspiration PNA vs pneumonitis  -started on zosyn-completed initial 10 day course  -checked ETT asp-candida likely contaminant  -failed multiple SBTs d/t increased RR, work of breathing, hypertension, hypoxemia, significant thick secretions  -remains MV dependent   -hx of likely TANYA and previous smoking hx. Has sign dense consolidations and atelectasis on chest CT  -s/p trach by ENT on 12/4/24 and PEG by GI on 12/5/24  -started trach/vent weaning as able-limited by HTN and  GI issues with recurrent emesis and persistent ileus  -PRN ABG and CXR  -saline nebs  -weaned off precedex  -psych consult for assistance with sedation and agitation-defer to their recs  -on scheduled clonidine patch in attempt to wean off precedex  -restarted on zosyn for recurrent aspiration-complete another 7 day course   -daily SBT and attempt to wean from the vent. Tolerated 2 hrs on 12/19/24     Previous hx of smoking and ETOH  -continue duonebs PRN     NAIMA on CKD and metabolic acidosis  -likely from surgery, and acute issues  -monitor UO and renal labs  -renal following   -diuresis as per cardiology and renal     Abd pain  -s/p abd CT as above  -not tolerating TF now  -PEG tube to LIS  -recurrent aspiration   -GI following-recommend trickle feeds      Proph:  -DVT: hep sq     Dispo:  -full code  -SW/ to assist with LTAC placement      CV SURGERY:    In bed on exam, awake/alert, calm and appropriate.  No acute events noted overnight.  Remains off of Precedex over 48 hours.  No visitors at bedside currently     Objective:  /75   Pulse 86   Temp 98.5 °F (36.9 °C) (Oral)   Resp 18   Ht 5' 5\" (1.651 m)   Wt 285 lb 8 oz (129.5 kg)   SpO2 99%   BMI 47.51 kg/m²       Component Value Date     WBC 9.9 12/20/2024     HGB 8.2 12/20/2024     HCT 25.2 12/20/2024     .0 12/20/2024     CREATSERUM 1.48 12/20/2024     BUN 27 12/20/2024      12/20/2024     K 4.2 12/20/2024      12/20/2024     CO2 26.0 12/20/2024      12/20/2024     CA 9.3 12/20/2024     MG 2.2 12/20/2024     PHOS 5.0 12/20/2024     Assessment/Plan:  POD #28 s/p emergent aortic dissection repair  Remains hemodynamically stable for cardiac standpoint.  Hypertension management per cardiology, wean off labetalol to maintain SBP less than 140.  Vent management per critical care/pulmonary -remains on FiO2 30%/trach  PEG-meds/trickle feeds started today, management per GI.  Wean off TPN once tolerating goal TF  Webb removed yesterday-straight cath x 2 thus far, if continues to retain, replace Webb tomorrow.     CV surgery to follow follow peripherally now, notify us if any issues with wounds.  Multiple medical issues managed by specialists/hospitalist-plan for DC to LTAC once stable off labetalol.  Appreciate CM/SW to assist with DC planning       CM/ SW:    Aly Alford at [facility] notified CM that ins auth has been obtained for LTAC placement, RN and MD made aware. Pt currently no tolerating tube feedings per RN.     12/20/2024 at 1140: Probable weekend dc. [facility] placed on will call from 12/21-23, PCS updated.     Plan: [facility] for LTAC pending medical clearance.

## 2024-12-20 NOTE — PROGRESS NOTES
24 0840   Wound 24 Groin Left;Medial   Date First Assessed: 24   Present on Original Admission: No  Primary Wound Type: Incision  Location: Groin  Wound Location Orientation: Left;Medial  Wound Description (Comments): S/P incision   Wound Image    Site Assessment Moist;Pink;Red;Yellow   Closure Not approximated   Drainage Amount Small   Drainage Description Serosanguineous   Treatments Cleansed;Wound ;Topical (Barrier/Moisturizer/Ointment)   Dressing 2x2s;Gauze;Aquacel Foam  (Santyl, Saline moistened gauze)   Dressing Changed New   Dressing Status Clean;Dry;Intact   Margins Poorly defined   Non-staged Wound Description Full thickness   Pamela-wound Assessment Moist;Intact   Wound Granulation Tissue Red   Wound Bed Granulation (%) 5 %   Wound Bed Epithelium (%) 20 %   Wound Bed Slough (%) 75 %   Wound Bed Eschar (%) 0 %   Wound Bed Fibrin (%) 0 %   State of Healing Non-healing   Wound Odor None       WOUND CARE NOTE    History:  Past Medical History:    Chronic kidney disease (CKD)    Esophageal reflux    High blood pressure    High cholesterol    HYPERTENSION    Hypertension    Unspecified essential hypertension     Past Surgical History:   Procedure Laterality Date    Colonoscopy N/A 2018    Procedure: COLONOSCOPY;  Surgeon: Magdy Webber MD;  Location: [facility] ENDOSCOPY    Endometrial ablation      child passed away for 5 mins          1    Other surgical history  11    cysto-Dr. Lacey -- pt denies      Social History     Socioeconomic History    Marital status: Single   Tobacco Use    Smoking status: Former     Current packs/day: 0.00     Average packs/day: 1 pack/day for 13.0 years (13.0 ttl pk-yrs)     Types: Cigarettes     Start date:      Quit date:      Years since quittin.9    Smokeless tobacco: Former   Vaping Use    Vaping status: Never Used   Substance and Sexual Activity    Alcohol use: Yes     Alcohol/week: 1.0 - 2.0 standard drink of  alcohol     Types: 1 - 2 Cans of beer per week     Comment: every day    Drug use: No     Social Drivers of Health     Food Insecurity: Unknown (11/22/2024)    Food Insecurity     Food Insecurity: Patient unable to answer   Transportation Needs: Unknown (11/22/2024)    Transportation Needs     Lack of Transportation: Patient unable to answer   Housing Stability: Unknown (11/22/2024)    Housing Stability     Housing Instability: Patient unable to answer           PLAN   Recommendations:  Turn schedules  Heels elevated using pillows, heel wedge or heel boots to offload heels    Wound(s)  Location: Left Groin  Cleansing  Saline   Topical Santyl  Dressings Saline moistened gauze, dry gauze  Secure with Bordered foam dressing  Frequency Daily       OBJECTIVE   RN Consult new      ASSESSMENT   Escobar Score:  Escobar Scale Score: 12    Chart Reviewed: yes    Wound(s):  Assessed left groin wound, presents with slough. Recommend Santyl daily for enzymatic debridement. Notified bedside RN.       Allergies: Atorvastatin, Pravastatin, Rosuvastatin, and Seasonal    Labs:   Lab Results   Component Value Date    WBC 9.9 12/20/2024    HGB 8.2 (L) 12/20/2024    HCT 25.2 (L) 12/20/2024    .0 12/20/2024    CREATSERUM 1.48 (H) 12/20/2024    BUN 27 (H) 12/20/2024     12/20/2024    K 4.2 12/20/2024     12/20/2024    CO2 26.0 12/20/2024     (H) 12/20/2024    CA 9.3 12/20/2024    ALB 3.2 12/15/2024    ALKPHO 68 12/15/2024    BILT 0.6 12/15/2024    TP 5.7 12/15/2024    AST 25 12/15/2024    ALT 23 12/15/2024    NATHALIE 99 11/25/2024    LIP 28 11/25/2024    MG 2.2 12/20/2024    PHOS 5.0 12/20/2024     No results found for: \"PREALBUMIN\"      Time Spent 30 Minutes.    Emily Mc RN  [facility]  [facility]  [phone]

## 2024-12-20 NOTE — RESPIRATORY THERAPY NOTE
PT   with  trach size  portex #8  received  on the  vent  with settings  PRVC/ AC/ RR 18/  VT  550 / FIO2  30%/ PEEP 5 . Sx   done  as needed  and  continue  to  monitor  the PT

## 2024-12-20 NOTE — DIETARY NOTE
ADULT NUTRITION REASSESSMENT    Pt is at high nutrition risk.  Pt does not meet malnutrition criteria.      RECOMMENDATIONS TO MD: See Nutrition Intervention for PN specifics . KUB today shows no further dilation of SB (improvement) per MD chart notes. Received consult to initiate trickle feeds today. Discussed with Dr. Barrientos via [employer]. Will continue TPN until tolerance of EN is established. See below for rx.     ADMITTING DIAGNOSIS:  Dissection of ascending aorta (HCC)  PERTINENT PAST MEDICAL HISTORY:   Past Medical History:    Chronic kidney disease (CKD)    Esophageal reflux    High blood pressure    High cholesterol    HYPERTENSION    Hypertension    Unspecified essential hypertension     PATIENT STATUS: Initial 11/27/24: RD received consult to initiate tube feedings. Pt is POD #5 emergent aortic dissection and repair. Pt has been intubated, unable to extubate failing SBT's. NPO x 6 days today. Well-nourished. No weight loss, actually some gain since admission, likely r/t post op fluid changes. On Lasix. No pressors. Not at significant risk for refeeding syndrome.   1127:  -Attempted to discuss with CYNDIE Milan via phone call. RN states Propofol is current running at 45 mcg/kg/min (31.6ml/hr providing 835 kcal from lipids).   -RN states IV fluids currently running: D5/NaCl 0.45%. States current rate in chart is accurate (40 ml/hr). IVF status has not been updated in active meds nor in flowsheets since 11/25 @ 0600.   -RN states pt continues on insulin drip per protocol \"while pt is intubated.\"   -Sent secure chat to APN today to confirm if IVF/insulin drip will continue once tube feedings are started. Awaiting response.     Update 12/2/24: Pt cont to require full vent support. Failing SBTs. Will need trach and PEG this week per MD chart notes. Receiving high dose propofol  at 50mcg/kg/min. Blood sugars well-controlled. Non DM, off insulin gtt. Pt having thick secretions requiring significant amount of  suction. Having BM's, last on 11/30. Now with new open keloid wound to right breast. Seen by wound care RN. Cont TF, tolerating at goal rate with modular protein added BID.   Update 12/5/24: Pt had new trach placed yesterday, doing well. Restraints off during the day. PEG placed this AM by Dr. Barrientos. Plans to resume Gtube feedings if safe after 24 hours following placement. Will adjust TF order as appropriate tomorrow in anticipation of resuming via Gtube. Monitor propofol titration/dc. On Reglan. May need to utilize renal formula until phos comes down WNL.   Update 12/10/24: TF was stopped yesterday. Per MD/RN chart notes, pt with high residuals, episode of emesis, and now fecal-like matter found around trach opening. Was cleaned and replaced by ENT. Pt has been having BM's, appropriate stool consistency for being tube fed. Had KUB done, no ileus or bowel obstruction, but large stool burden seen. On bowel regimen. Will avoid/limit narcotics. Continuing to hold TF for now. Monitor for nutrition plan.   Early Reassessment 12/12/24: New consult to initiate TPN tonight. Now with ileus. Feculent output from trach, nose, and TF material in mouth when suctioning. +Emesis with aspiration. TF have been on hold x 3 days. Ordered appropriate labs to review prior to TPN start. No IVF. Trying to wean off sedation, Propofol currently off since 0845 today. Still on Precedex. Double lumen PICC line in place to right basilic and able to be used for TPN per RN.   Update 12/16/24: Pt is on 4th bag of TPN today, with 5th bag planned to start tonight. Will keep macro nutrition the same and start trickle feeds today as well. Pt remains extremely fluid overloaded, diuretic will be increased per Nephro MD notes. Urine output has been good. Remains with PEG tube to LIS with 500 ml output recorded yesterday, but one shift not charted, so suspect overall output is higher. Having BM's last few days.   Brief update: 12/17/24: Trickle feeds  started yesterday, but pt had episode of emesis overnight/early this AM per RN report today. No documentation from yesterday in flowsheets when trickle feeds were started or stopped, so unsure of total volume given. 300ml output from PEG since LIS was restarted. Will cont TPN for nutrition until EN able to be resumed.     Update 12/20/24: Reassessment: Trickle feeds have not yet been started. Confirmed with RN Flor today, trickle feeds will start TODAY. Will keep at low rate of 15 ml/hr and will not advance. Will check back tomorrow to check tolerance and advance only if ok with MD (Dr. Barrientos or GI?). Pt did have emesis yesterday AM after morning meds were given, then a few more episode afterwards, but RN states was not a full vomiting episode of large amount of gastric content. More like bilious spitting/burping up. Keeping TPN at current nutrition for now, will begin to wean if/when pt tolerated advancement of TF. See below for EN rx. Pt is having BM's. Good UOP. Edema improved. Nutritional labs WNL. Plans to dc to [place] once TPN is weaned off and tolerating TF.     FOOD/NUTRITION RELATED HISTORY:  Appetite: NPO  Intake:  Trickle feeds to start today  Intake Meeting Needs: NPO and TPN + TF will meet needs .   Percent Meals Eaten (last 6 days)       Date/Time Percent Meals Eaten (%)    12/14/24 2200 0 %            Food Allergies: No Known Food Allergies (NKFA)  Cultural/Ethnic/Yarsanism Preferences: Not Obtained    GASTROINTESTINAL:  +BM 12/15 (large), and BM today (extra large). On bowel regimen, PEG tube to LIS.  UOP good - 12/19: 1980 ml (0.6 ml/kg/hr)  Fluid status much improved.     MEDICATIONS: reviewed; Meds providing 200ml fluid daily  On Reglan   adult 3 in 1 TPN 83.3 mL/hr at 12/19/24 2126    dextrose 10%      labetalol (Trandate) 400 mg in sodium chloride 0.9% 200 mL infusion Stopped (12/20/24 1208)    dextrose 10%        metoclopramide  5 mg Intravenous Q6H    collagenase   Topical Daily    pantoprazole   40 mg Intravenous Q12H    mirtazapine  7.5 mg Oral Nightly    furosemide  40 mg Intravenous BID (Diuretic)    LORazepam  0.5 mg Intravenous QID    cloNIDine  1 patch Transdermal Weekly    carvedilol  37.5 mg Oral BID with meals    aspirin  81 mg Peg Tube Daily    isosorbide dinitrate  40 mg Per NG Tube TID (Nitrates)    hydrALAZINE  150 mg Oral Q8H GABRIEL    amLODIPine  10 mg Oral Daily    heparin  5,000 Units Subcutaneous Q8H GABRIEL    chlorhexidine gluconate  15 mL Mouth/Throat BID     LABS: reviewed; lytes are WNL  Recent Labs     12/18/24  0409 12/19/24  0358 12/20/24  0626   * 139* 133*   BUN 20 21 27*   CREATSERUM 1.12* 1.25* 1.48*   CA 9.0 9.0 9.3   MG 2.1 2.2 2.2    140 138   K 4.0 4.1 4.2    106 104   CO2 26.0 27.0 26.0   PHOS 3.7 4.9 5.0   OSMOCALC 296* 295 293     NUTRITION RELATED PHYSICAL FINDINGS:  - Nutrition Focused Physical Exam (NFPE): well nourished  - Fluid Accumulation: non-pitting generalized ---> See RN documentation for details  - Skin Integrity: surgical wound(s) and other wounds noted ---> See RN documentation for details    ANTHROPOMETRICS:  HT: 165.1 cm (5' 5\")  WT: 129.5 kg (285 lb 8 oz) - Weight is still up from estimated dry weight, but has improved.  BMI: Body mass index is 47.51 kg/m².  BMI CLASSIFICATION: 35-39.9 kg/m2 - obesity class II  IBW: 115 lbs        246% IBW  Usual Body Wt: ~249 lbs in the past per EMR        115% UBW  Dosing Wt: 249# (113 kg) taken on 5/17- utilized for energy calculations.     WEIGHT HISTORY:  Patient Weight(s) for the past 336 hrs:   Weight   12/19/24 1100 129.5 kg (285 lb 8 oz)   12/16/24 1507 129.9 kg (286 lb 6 oz)   12/13/24 0500 (!) 140.2 kg (309 lb 1.4 oz)   12/12/24 0530 106.8 kg (235 lb 7.2 oz)   12/11/24 0400 130.6 kg (287 lb 14.7 oz)   12/09/24 0540 135 kg (297 lb 9.9 oz)   12/08/24 1500 103.6 kg (228 lb 4.8 oz)   12/07/24 0400 131 kg (288 lb 12.8 oz)     Wt Readings from Last 10 Encounters:   12/19/24 129.5 kg (285 lb 8 oz)    10/24/24 115.2 kg (254 lb)   05/17/24 112.9 kg (249 lb)   02/22/24 112.9 kg (249 lb)   12/06/23 112.9 kg (249 lb)   10/09/23 112.5 kg (248 lb)   09/25/23 111.1 kg (245 lb)   08/10/23 111.7 kg (246 lb 4.1 oz)   08/09/23 111.7 kg (246 lb 4.8 oz)   03/25/23 109.3 kg (241 lb)     NUTRITION DIAGNOSIS/PROBLEM:   Inadequate oral intake related to Decreased ability to consume sufficient energy as evidenced by NPO status and no PO intake during admission, and intubation.    Progress:   No improvement, continue - On TPN and starting trickle feeds today 12/20    NUTRITION INTERVENTION:     NUTRITION PRESCRIPTION:   Estimated Nutrition needs: --dosing wt of 113 kg - wt taken on 5/17 - likely close to dry/usual weight  Calories: 7251-8548 calories/day (15-17 calories per kg Dosing wt)  [place]: 1894 kcal (~16 kcal/kg dosing wt) recalculated on 12/16  Protein: 62- 104g protein/day (1.2-2 g protein/kg Ideal body wt (IBW))  Fluid Needs: 1ml/kcal or 25ml/kg adjusted weight for obesity= 1900 ml (76 kg adjusted IBW)    - Diet:       Procedures    NPO     -Parenteral Nutrition: 2000 ml volume. 95g protein (320 kcal), 264g dextrose (900 kcal), and 45g lipids (450 kcal). Provides a total of 1730 kcal and meets ~91% of estimated energy needs and 100% of estimated protein needs.     - Enteral Nutrition (Trickle feeds per MD): Jevity 1.2 at 15ml/hr (do not advance) via Gtube. Based on average 22 hour infusion time. Current rate provides 396 kcal, 18 grams protein, 267ml water. Water flushes 30ml q 4 hours (180ml).  Total fluid daily = 447 ml    Combination of TPN + TF (if tolerates x 24 hours) = 2126 kcals (>100% of estimated needs), 113 g protein (>100% of estimated needs), and a total fluid volume of 2447 ml daily. Will monitor need to begin weaning TPN to transition to EN.       - RD Care Plan:  initiate Trickle feeds, cont TPN until tolerance is established and wean as appropriate.  - Medical Food Supplements- NPO. Rational/use of  oral supplements discussed.  - Vitamin and mineral supplements: none  - Feeding assistance: NPO  - Nutrition education: not appropriate     - Coordination of nutrition care: collaboration with other providers  - Discharge and transfer of nutrition care to new setting or provider: monitor plans    MONITOR AND EVALUATE/NUTRITION GOALS:  - Food and Nutrient Intake:      Monitor: N/A  - Food and Nutrient Administration:      Monitor: resumption of enteral nutrition, TPN initiation, and TPN tolerance  - Anthropometric Measurement:    Monitor weight  - Nutrition Goals:      allow wt loss due to fluid losses, long term gradual wt loss, labs within acceptable limits, monitor fluid status, improved GI status, and TPN + TF to meet >80% of needs.    DIETITIAN FOLLOW UP: RD to follow and monitor nutrition status       Stefany Argueta RD, LDN  Clinical Dietitian  P: [phone]

## 2024-12-20 NOTE — PROGRESS NOTES
Progress Note     Alisha Wilde Patient Status:  Inpatient    10/25/1965 MRN [id number]   Location [facility] 2W/SW Attending Stefani Hsu MD   Hosp Day # 28 PCP Bridger Avendano MD     Subjective:   S: Patient stooling well per RN. No nausea today.   On vent via trach.   Off nicardipine gtt. Labetolol gtt almost weaned off.     Review of Systems:   10 point ROS completed and was negative, except for pertinent positive and negatives stated in subjective.    Objective:   Vital signs:  Temp:  [97 °F (36.1 °C)-99 °F (37.2 °C)] 98.4 °F (36.9 °C)  Pulse:  [72-89] 83  Resp:  [13-23] 17  BP: ()/(49-96) 120/65  SpO2:  [97 %-100 %] 100 %  FiO2 (%):  [30 %] 30 %    Wt Readings from Last 6 Encounters:   24 285 lb 8 oz (129.5 kg)   10/24/24 254 lb (115.2 kg)   24 249 lb (112.9 kg)   24 249 lb (112.9 kg)   23 249 lb (112.9 kg)   10/09/23 248 lb (112.5 kg)         Physical Exam:    General: AAF, NAD Alert , Tracheostomy in place,     , morbidly obese  HEENT: trach in place   Respiratory: Clear to auscultation bilaterally. No wheezes. No rhonchi.  Cardiovascular: S1, S2. Regular rate and rhythm. No murmurs, rubs or gallops.   Abdomen: Distended, hypoactive BS; PEG in place   : left groin wound (refer wound care notes)  Neurologic: No focal neurological deficits.   Musculoskeletal: Moves all extremities.  Extremities: No edema.    Results:   Diagnostic Data:      Labs:    Labs Last 24 Hours:   BMP     CBC    Other     Na 138 Cl 104 BUN 27 Glu 133   Hb 8.2   PTT - Procal -   K 4.2 CO2 26.0 Cr 1.48   WBC 9.9 >< .0  INR - CRP -   Renal Lytes Endo    Hct 25.2   Trop - D dim -   eGFR - Ca 9.3 POC Gluc  147    LFT   pBNP - Lactic -   eGFR AA - PO4 5.0 A1c -   AST - APk - Prot -  LDL -     Mg 2.2 TSH -   ALT - T miranda - Alb -        COVID-19 Lab Results    COVID-19  Lab Results   Component Value Date    COVID19 Not Detected 2024    COVID19 Not Detected 08/10/2023    COVID19 Not  Detected 05/13/2022       Pro-Calcitonin  No results for input(s): \"PCT\" in the last 168 hours.    Cardiac  No results for input(s): \"TROP\", \"PBNP\" in the last 168 hours.    Creatinine Kinase  No results for input(s): \"CK\" in the last 168 hours.    Inflammatory Markers  No results for input(s): \"CRP\", \"NATALIA\", \"LDH\", \"DDIMER\" in the last 168 hours.    Imaging: Imaging data reviewed in Epic.    Medications:    metoclopramide  5 mg Intravenous Q6H    collagenase   Topical Daily    lidocaine-menthol  1 patch Transdermal Daily    pantoprazole  40 mg Intravenous Q12H    mirtazapine  7.5 mg Oral Nightly    furosemide  40 mg Intravenous BID (Diuretic)    LORazepam  0.5 mg Intravenous QID    cloNIDine  1 patch Transdermal Weekly    carvedilol  37.5 mg Oral BID with meals    aspirin  81 mg Peg Tube Daily    isosorbide dinitrate  40 mg Per NG Tube TID (Nitrates)    hydrALAZINE  150 mg Oral Q8H GABRIEL    amLODIPine  10 mg Oral Daily    heparin  5,000 Units Subcutaneous Q8H GABRIEL    chlorhexidine gluconate  15 mL Mouth/Throat BID       Assessment & Plan:   ASSESSMENT / PLAN:     60 y/o Morbid obesity, hypertension, gastroesophageal reflux disease, chronic kidney disease stage 2 to 3, and hyperlipidemia admitted on 11/21 for Chest pain -CT CT angiogram of the chest, abdomen, and pelvis rule out dissection showed type A aortic dissection s/p emergent repair 11/22/24 , transferred to ICU post op intubated on 11/22, failed SBT, s/p trach 12/4,  PEG 12/5, completed 10 days of abx zsecondary to aspiration event , no with ileus and Abx Vanc and Zosyn resumed for aspiration pneumonia       Ileus   -Gen surgery and GI following   -PEG tube to LIS   -possibly will need NG   -Continue IV abx         Type A aortic dissection   -s/p emergent repair now intubated in ICU  -on labetalol gtt. Nicardipine stopped 12/18/24  - cardiothoracic with goal SBP <140  -multiple oral antiHTN meds for BP now starting to be given since GI recommended start trickle  feeds  -s/p pRBC transfusion 12/14/24 for anemia. No obvious s/s bleeding  -CV surgery, cards and critical care on consult.   TTE LVEF 75-80%.        Ileus   -On IV Reglan- montior QT interval   - continue bowel regimen- miralax bid, tap water enemas- GI on board   Will order another CT scan to see if there is possible obstruction  New picc 12/2       - Plan is to eventually discharge to LTAC  PEG 12/5-  ok for meds, starting trickle feeds, keep TPN for now but wean as able per dietitian.   Weaning antihypertensives, kub improved     Acute hypoxic respiratory failure   Aspiration event post-op  Sputum cx candida - likely contaminant   Completed 10 days of zosyn.   Failed SBT multiple times. S/p trach 12/4 and PEG 12/5   ENT and GI on consult.  Pulmonary on consult.   CXR with multifocal airspace dz-completed another 7 days zosyn per pulm for recurrent aspiration   Albuterol nebs   Seroquel started to help with agitation 50 mg BID  -daily sbt per pulm      H/o tobacco and ETOH abuse   Duonebs PRN   CIWA protocol     NAIMA on CKD III   Volume overload  Renal on consult.   [place] to be secondary to MARY LOU/ATN   Off lasix drip now on IV BID lasix. Stable  Doppler renal ultrasound unremarkable for renal artery stenosis     Essential HTN   Coreg 25 mg BID, norvasc 10mg daily, hydralazine 150mg TID. Clonidinie patch 0.2mg TID   Add hydralazine combination with isosobide 150-40 mg TID   On lasix  Webb in place   ARB on hold due to NAIMA.   Weaning labetalol , off cardene now     Iron def anemia  IV iron.   Iron level low   Transfuse for Hb < 7.0      Other medical problems:  Morbid Obesity   TANYA   Left groin wound - cont wound care       Quality:  DVT Prophylaxis: Heparin   CODE status: FULL   DISPO: LTAC once off TPN, tolerating feeds, and off IV bp meds.         MDM: High   I personally spent time on chart/note review, review of labs/imaging, discussion with patient, physical exam, discussion with staff, consultants, coordinating  care, writing progress note, and discussion of plan of care.      Stefani Hsu MD    Supplementary Documentation:

## 2024-12-21 LAB
ANION GAP SERPL CALC-SCNC: 8 MMOL/L (ref 0–18)
BUN BLD-MCNC: 32 MG/DL (ref 9–23)
BUN/CREAT SERPL: 21.1 (ref 10–20)
CALCIUM BLD-MCNC: 9.3 MG/DL (ref 8.7–10.4)
CHLORIDE SERPL-SCNC: 105 MMOL/L (ref 98–112)
CO2 SERPL-SCNC: 27 MMOL/L (ref 21–32)
CREAT BLD-MCNC: 1.52 MG/DL
EGFRCR SERPLBLD CKD-EPI 2021: 39 ML/MIN/1.73M2 (ref 60–?)
GLUCOSE BLD-MCNC: 136 MG/DL (ref 70–99)
GLUCOSE BLDC GLUCOMTR-MCNC: 129 MG/DL (ref 70–99)
GLUCOSE BLDC GLUCOMTR-MCNC: 138 MG/DL (ref 70–99)
GLUCOSE BLDC GLUCOMTR-MCNC: 156 MG/DL (ref 70–99)
GLUCOSE BLDC GLUCOMTR-MCNC: 161 MG/DL (ref 70–99)
GLUCOSE BLDC GLUCOMTR-MCNC: 174 MG/DL (ref 70–99)
MAGNESIUM SERPL-MCNC: 2.2 MG/DL (ref 1.6–2.6)
OSMOLALITY SERPL CALC.SUM OF ELEC: 299 MOSM/KG (ref 275–295)
PHOSPHATE SERPL-MCNC: 3.6 MG/DL (ref 2.4–5.1)
POTASSIUM SERPL-SCNC: 4.3 MMOL/L (ref 3.5–5.1)
SODIUM SERPL-SCNC: 140 MMOL/L (ref 136–145)

## 2024-12-21 PROCEDURE — 99233 SBSQ HOSP IP/OBS HIGH 50: CPT | Performed by: INTERNAL MEDICINE

## 2024-12-21 PROCEDURE — 99233 SBSQ HOSP IP/OBS HIGH 50: CPT | Performed by: STUDENT IN AN ORGANIZED HEALTH CARE EDUCATION/TRAINING PROGRAM

## 2024-12-21 RX ORDER — METOLAZONE 5 MG/1
5 TABLET ORAL ONCE
Status: COMPLETED | OUTPATIENT
Start: 2024-12-21 | End: 2024-12-21

## 2024-12-21 NOTE — PLAN OF CARE
Labetalol drip weaned to off.  Confirmed goal SBP is less than 140 mmHg per cardiothoracic team.  3 incontinent stools this shift.  Trickle feeds started this afternoon. Pt tolerated SBT of 2 hours today.  Consideration for trach collar tomorrow if patient continues to tolerate tube feeding and is hemodynamically stable.  Lidocaine patch to right upper thigh with improvement in pain.      Problem: CARDIOVASCULAR - ADULT  Goal: Maintains optimal cardiac output and hemodynamic stability  Description: INTERVENTIONS:  - Monitor vital signs, rhythm, and trends  - Monitor for bleeding, hypotension and signs of decreased cardiac output  - Evaluate effectiveness of vasoactive medications to optimize hemodynamic stability  - Monitor arterial and/or venous puncture sites for bleeding and/or hematoma  - Assess quality of pulses, skin color and temperature  - Assess for signs of decreased coronary artery perfusion - ex. Angina  - Evaluate fluid balance, assess for edema, trend weights  Outcome: Progressing     Problem: RESPIRATORY - ADULT  Goal: Achieves optimal ventilation and oxygenation  Description: INTERVENTIONS:  - Assess for changes in respiratory status  - Assess for changes in mentation and behavior  - Position to facilitate oxygenation and minimize respiratory effort  - Oxygen supplementation based on oxygen saturation or ABGs  - Provide Smoking Cessation handout, if applicable  - Encourage broncho-pulmonary hygiene including cough, deep breathe, Incentive Spirometry  - Assess the need for suctioning and perform as needed  - Assess and instruct to report SOB or any respiratory difficulty  - Respiratory Therapy support as indicated  - Manage/alleviate anxiety  - Monitor for signs/symptoms of CO2 retention  Outcome: Progressing     Problem: GASTROINTESTINAL - ADULT  Goal: Minimal or absence of nausea and vomiting  Description: INTERVENTIONS:  - Maintain adequate hydration with IV or PO as ordered and tolerated  -  Nasogastric tube to low intermittent suction as ordered  - Evaluate effectiveness of ordered antiemetic medications  - Provide nonpharmacologic comfort measures as appropriate  - Advance diet as tolerated, if ordered  - Obtain nutritional consult as needed  - Evaluate fluid balance  Outcome: Progressing  Goal: Maintains or returns to baseline bowel function  Description: INTERVENTIONS:  - Assess bowel function  - Maintain adequate hydration with IV or PO as ordered and tolerated  - Evaluate effectiveness of GI medications  - Encourage mobilization and activity  - Obtain nutritional consult as needed  - Establish a toileting routine/schedule  - Consider collaborating with pharmacy to review patient's medication profile  Outcome: Progressing     Problem: MUSCULOSKELETAL - ADULT  Goal: Return mobility to safest level of function  Description: INTERVENTIONS:  - Assess patient stability and activity tolerance for standing, transferring and ambulating w/ or w/o assistive devices  - Assist with transfers and ambulation using safe patient handling equipment as needed  - Ensure adequate protection for wounds/incisions during mobilization  - Obtain PT/OT consults as needed  - Advance activity as appropriate  - Communicate ordered activity level and limitations with patient/family  Outcome: Progressing

## 2024-12-21 NOTE — PROGRESS NOTES
Cardiology Progress Note    Alisha Wilde Patient Status:  Inpatient    10/25/1965 MRN [id number]   Location [facility] 2W/SW Attending Stefani Hsu MD   Hosp Day # 29 PCP Bridger Avendaon MD     Interval Note:  Patient seen and examined  Awake, following commands  Remains on ventilator/trach        --------------------------------------------------------------------------------------------------------------------------------  ROS 12 systems reviewed, pertinent findings above.  ROS    History:  Past Medical History:    Chronic kidney disease (CKD)    Esophageal reflux    High blood pressure    High cholesterol    HYPERTENSION    Hypertension    Unspecified essential hypertension     Past Surgical History:   Procedure Laterality Date    Colonoscopy N/A 2018    Procedure: COLONOSCOPY;  Surgeon: Magdy Webber MD;  Location: [facility] ENDOSCOPY    Endometrial ablation      child passed away for 5 mins          1    Other surgical history  11    cysto-Dr. Lacey -- pt denies     Family History   Problem Relation Age of Onset    Hypertension Mother     Heart Disorder Mother 70    Other (Other) Mother         kidney failure    Hypertension Father     Hypertension Maternal Grandfather     Cancer Neg     Diabetes Neg     Glaucoma Neg     Macular degeneration Neg       reports that she quit smoking about 25 years ago. Her smoking use included cigarettes. She started smoking about 38 years ago. She has a 13 pack-year smoking history. She has quit using smokeless tobacco. She reports current alcohol use of about 1.0 - 2.0 standard drink of alcohol per week. She reports that she does not use drugs.    Objective:   Temp: 98.4 °F (36.9 °C)  Pulse: 80  Resp: 15  BP: 146/74  FiO2 (%): 30 %    Intake/Output:     Intake/Output Summary (Last 24 hours) at 2024 0922  Last data filed at 2024 0600  Gross per 24 hour   Intake 1566 ml   Output 2465 ml   Net -899 ml       Physical Exam:    General:  Awake, following commands-on trach/vent  HEENT: Normocephalic, anicteric sclera, neck supple.  Neck: No JVD, carotids 2+, no bruits.  Cardiac: Regular rate and rhythm. S1, S2 normal. No murmur, pericardial rub, S3.  Lungs: Clear without wheezes, rales, rhonchi or dullness.  Normal excursions and effort.  Abdomen: Soft, non-tender. BS-present.  Extremities: Without clubbing, cyanosis or edema.  Peripheral pulses are 2+.  Neurologic: Non-focal  Skin: Warm and dry.       Assessment   Hypoxic respiratory failure status post trach, on vent  Multiple episodes of aspiration, aspiration pneumonia  Likely ileus  Type 1A aortic dissection status post emergent repair  CKD  History of alcohol abuse  Hypertension  Volume overload      Plan  Vitals acceptable  Continue Norvasc, Coreg, clonidine patch, hydralazine, Isordil  Continue IV diuresis for volume overload, creatinine stable-give dose of metolazone 5 mg to augment diuresis    Thank you for allowing me to take part in the care of Alishaedith Wilde. Please call with any questions of concerns.    Level of care: L3    Don Beth DO  [facility]   Interventional Cardiac and Vascular Services      Don Beth DO  December 21, 2024  9:22 AM

## 2024-12-21 NOTE — RESPIRATORY THERAPY NOTE
Trach care and suction provided, bilateral bs, no acute events or changes made overnight, RT will continue to monitor.         12/21/24 0455   Vent Information   Interface Invasive   Vent Type AV   Vent plugged into main power? Yes   Vent Mode PRVC/AC   Settings   FiO2 (%) 30 %   Resp Rate (Set) 18   Vt (Set, mL) 550 mL   Waveform Decelerating ramp   PEEP/CPAP (cm H2O) 5 cm H20   Insp Time (sec) 1 sec   Trigger Sensitivity Flow (L/min) 1 L/min   Humidification Heater   H2O Bag Level 1/2 Full   Heater Temperature 98.6 °F (37 °C)   Readings   Total RR 18   Minute Ventilation (L/min) 10.1 L/min   Expiratory Tidal Volume 550 mL   PIP Observed (cm H2O) 33 cm H2O   MAP (cm H2O) 13   I/E Ratio 1:2.3   Plateau Pressure (cm H2O) 29 cm H2O   Static Compliance (L/cm H2O) 24   Dynamic Compliance (L/cm H2O) 20 L/cm H2O

## 2024-12-21 NOTE — PROGRESS NOTES
CV Surgery     S  Patient sleeping. No acute events.     Exam Sternal wound healing well    A/P   Off IV antihypertensives   Hope for discharge to LTAC in coming days.     Keep in CV ICU      ALBERT Villarreal MD

## 2024-12-21 NOTE — PROGRESS NOTES
[facility]  part of [facility]    Progress Note    Alisha Wilde Patient Status:  Inpatient    10/25/1965 MRN [id number]   Location [facility] 2W/SW Attending Stefani Hsu MD   Hosp Day # 29 PCP Bridger Avendano MD       Subjective:   Subjective:  Patient was seen and examined  Comfortable on a vent via trach with mild secretion  Generalized fatigue but moving extremity and following commands  Tolerating G-tube  Denied chest pain or abdominal pain  Objective:   Blood pressure 149/73, pulse 85, temperature 98.4 °F (36.9 °C), temperature source Axillary, resp. rate 20, height 5' 5\" (1.651 m), weight 290 lb 4.8 oz (131.7 kg), SpO2 99%, not currently breastfeeding.  Physical Exam  Vitals and nursing note reviewed.   Constitutional:       General: She is not in acute distress.     Appearance: She is obese.   HENT:      Head: Atraumatic.      Nose: Nose normal.      Mouth/Throat:      Mouth: Mucous membranes are moist.   Eyes:      General: No scleral icterus.  Cardiovascular:      Rate and Rhythm: Normal rate.      Heart sounds:      No gallop.   Pulmonary:      Comments: Good air exchange to both lungs with mild basilar rales otherwise clear with no wheezes or rhonchi  Abdominal:      General: Bowel sounds are normal.      Palpations: Abdomen is soft.      Tenderness: There is no abdominal tenderness. There is no guarding or rebound.   Musculoskeletal:      Right lower leg: Edema present.      Left lower leg: Edema present.   Skin:     General: Skin is dry.   Neurological:      Mental Status: Mental status is at baseline.         Results:   Lab Results   Component Value Date    WBC 9.9 2024    HGB 8.2 (L) 2024    HCT 25.2 (L) 2024    .0 2024    CREATSERUM 1.52 (H) 2024    BUN 32 (H) 2024     2024    K 4.3 2024     2024    CO2 27.0 2024     (H) 2024    CA 9.3 2024    ALB 3.2 12/15/2024     ALKPHO 68 12/15/2024    BILT 0.6 12/15/2024    TP 5.7 12/15/2024    AST 25 12/15/2024    ALT 23 12/15/2024    PTT 30.7 12/04/2024    INR 1.17 12/04/2024    TSH 2.085 12/07/2024    NATHALIE 99 11/25/2024    LIP 28 11/25/2024    DDIMER 0.42 12/08/2019    ESRML 15 06/05/2021    MG 2.2 12/21/2024    PHOS 3.6 12/21/2024    TROP <0.045 12/08/2019    TROPHS 12 11/21/2024     (H) 09/23/2021    B12 1,156 (H) 07/23/2018           Assessment & Plan:       1-Type A aortic dissection s/p repair  Complicated course and now with a trach and PEG  On vent via trach and stable  Blood pressure now better uncontrolled with multiple BP meds per G-tube       2-Post op acute respiratory failure  Failed extubation status post trach and PEG  Doing well on the vent via trach with 30% FiO2 and PEEP of 5  Mild tracheal secretion and stable  Daily weaning trials  Okay to transfer from pulmonary perspective to LTAC     3-Previous hx of smoking and ETOH  -continue duonebs PRN     4-NAIMA on CKD   Per renal      5-nutrition  Per G-tube    6-morbid obesity with a BMI of 48 and TANYA  Outpatient with a trach     7-DVT prophylaxis  Heparin subcu     Dispo:  -full code  -SW/ to assist with LTAC placement              Rg Wilkerson MD  12/21/2024

## 2024-12-21 NOTE — RESPIRATORY THERAPY NOTE
Pt received trach to vent settings PRVC/AC 18/550/30%/+5. Bilateral breath sounds on auscultation, suctioned and trach care done this morning. Pt refused to do trach collar today nurse Donta in room. At 1130 pt was weaned to cpap/ps +5/5-30%. Pt tolerated well for two hours. Pt was returned at 13:34 Per RT due to increase resp.rate >36. Pt is doing better now that is on full vent support. Will continue to follow.

## 2024-12-21 NOTE — DIETARY NOTE
Brief Nutrition Note      Tolerating Enteral Nutrition (EN) at trickle feeds @ 20 ml/hr. RN reports no N/V today.   Hence, MD okayed for RD to increased EN slowly from trickle feed 15 ml/hr to 10 ml q 10 hrs to goal rate 70 ml/hr. while weaning off CPN, which will  tonight at 9 pm   Urine output large (3415 ml/ 24 hr) --being Diuresed-- (lasix 2x/day) for volume overload. Scheduled Reglan given to improve motility. Plan discharge to LTAC.         Marlene Plan:     NUTRITION PRESCRIPTION:   Estimated Nutrition needs: --dosing wt of 113 kg - wt taken on  - likely close to dry/usual weight  Calories: 7673-2160 calories/day (15-17 calories per kg Dosing wt)  [place]: 1894 kcal (~16 kcal/kg dosing wt) recalculated on   Protein: 62- 104g protein/day (1.2-2 g protein/kg Ideal body wt (IBW))  Fluid Needs: 1ml/kcal or 25ml/kg adjusted weight for obesity= 1900 ml (76 kg adjusted IBW)    - Enteral Nutrition ( EN) : RD ordered the following,  EN Jevity 1.2  @ 15 ml/hr, increase by 10 ml q 10 hrs goal rate of 70 ml/hr via G-tube on ave 22 hr infusion time.         Water flushes of 30 ml q 4 hr (180 ml). Continue to adjust FWF  (Prescription  provides 1848 kcal, 85 g pro & 1247 ml free water.   Total free water of 1427 ml/day.   (16 kcal/kg dosing wt; 1.6 g protein/kg IBW)  Met 97% of kcal & 100 % of Protein needs and or >100 % RDIs)  Above orders discussed with RN via secure chat.        Will continue to follow up.    Blanca Overton RD, LDN, [facility]  Clinical Dietitian  [phone]

## 2024-12-21 NOTE — PLAN OF CARE
Problem: Patient Centered Care  Goal: Patient preferences are identified and integrated in the patient's plan of care  Description: Interventions:  - What would you like us to know as we care for you? I work at [employer] and I am still very good friends with people from grade school! My brother, Osbaldo Prince, has been making decisions for me.  - Provide timely, complete, and accurate information to patient/family  - Incorporate patient and family knowledge, values, beliefs, and cultural backgrounds into the planning and delivery of care  - Encourage patient/family to participate in care and decision-making at the level they choose  - Honor patient and family perspectives and choices  Outcome: Progressing     Problem: Diabetes/Glucose Control  Goal: Glucose maintained within prescribed range  Description: INTERVENTIONS:  - Monitor Blood Glucose as ordered  - Assess for signs and symptoms of hyperglycemia and hypoglycemia  - Administer ordered medications to maintain glucose within target range  - Assess barriers to adequate nutritional intake and initiate nutrition consult as needed  - Instruct patient on self management of diabetes  Outcome: Progressing     Problem: Patient/Family Goals  Goal: Patient/Family Long Term Goal  Description: Patient's Long Term Goal: To discharge from the hospital safely    Interventions:  - surgical interventions, rounding, medications  - See additional Care Plan goals for specific interventions  Outcome: Progressing  Goal: Patient/Family Short Term Goal  Description: Patient's Short Term Goal: to breathe better    Interventions:   - manage the ventilator for pt until she is able to breath on her own.  - See additional Care Plan goals for specific interventions  Outcome: Progressing     Problem: CARDIOVASCULAR - ADULT  Goal: Maintains optimal cardiac output and hemodynamic stability  Description: INTERVENTIONS:  - Monitor vital signs, rhythm, and trends  - Monitor for  bleeding, hypotension and signs of decreased cardiac output  - Evaluate effectiveness of vasoactive medications to optimize hemodynamic stability  - Monitor arterial and/or venous puncture sites for bleeding and/or hematoma  - Assess quality of pulses, skin color and temperature  - Assess for signs of decreased coronary artery perfusion - ex. Angina  - Evaluate fluid balance, assess for edema, trend weights  Outcome: Progressing  Goal: Absence of cardiac arrhythmias or at baseline  Description: INTERVENTIONS:  - Continuous cardiac monitoring, monitor vital signs, obtain 12 lead EKG if indicated  - Evaluate effectiveness of antiarrhythmic and heart rate control medications as ordered  - Initiate emergency measures for life threatening arrhythmias  - Monitor electrolytes and administer replacement therapy as ordered  Outcome: Progressing     Problem: RESPIRATORY - ADULT  Goal: Achieves optimal ventilation and oxygenation  Description: INTERVENTIONS:  - Assess for changes in respiratory status  - Assess for changes in mentation and behavior  - Position to facilitate oxygenation and minimize respiratory effort  - Oxygen supplementation based on oxygen saturation or ABGs  - Provide Smoking Cessation handout, if applicable  - Encourage broncho-pulmonary hygiene including cough, deep breathe, Incentive Spirometry  - Assess the need for suctioning and perform as needed  - Assess and instruct to report SOB or any respiratory difficulty  - Respiratory Therapy support as indicated  - Manage/alleviate anxiety  - Monitor for signs/symptoms of CO2 retention  Outcome: Progressing     Problem: GASTROINTESTINAL - ADULT  Goal: Minimal or absence of nausea and vomiting  Description: INTERVENTIONS:  - Maintain adequate hydration with IV or PO as ordered and tolerated  - Nasogastric tube to low intermittent suction as ordered  - Evaluate effectiveness of ordered antiemetic medications  - Provide nonpharmacologic comfort measures as  appropriate  - Advance diet as tolerated, if ordered  - Obtain nutritional consult as needed  - Evaluate fluid balance  Outcome: Progressing  Goal: Maintains or returns to baseline bowel function  Description: INTERVENTIONS:  - Assess bowel function  - Maintain adequate hydration with IV or PO as ordered and tolerated  - Evaluate effectiveness of GI medications  - Encourage mobilization and activity  - Obtain nutritional consult as needed  - Establish a toileting routine/schedule  - Consider collaborating with pharmacy to review patient's medication profile  Outcome: Progressing     Problem: METABOLIC/FLUID AND ELECTROLYTES - ADULT  Goal: Glucose maintained within prescribed range  Description: INTERVENTIONS:  - Monitor Blood Glucose as ordered  - Assess for signs and symptoms of hyperglycemia and hypoglycemia  - Administer ordered medications to maintain glucose within target range  - Assess barriers to adequate nutritional intake and initiate nutrition consult as needed  - Instruct patient on self management of diabetes  Outcome: Progressing  Goal: Electrolytes maintained within normal limits  Description: INTERVENTIONS:  - Monitor labs and rhythm and assess patient for signs and symptoms of electrolyte imbalances  - Administer electrolyte replacement as ordered  - Monitor response to electrolyte replacements, including rhythm and repeat lab results as appropriate  - Fluid restriction as ordered  - Instruct patient on fluid and nutrition restrictions as appropriate  Outcome: Progressing  Goal: Hemodynamic stability and optimal renal function maintained  Description: INTERVENTIONS:  - Monitor labs and assess for signs and symptoms of volume excess or deficit  - Monitor intake, output and patient weight  - Monitor urine specific gravity, serum osmolarity and serum sodium as indicated or ordered  - Monitor response to interventions for patient's volume status, including labs, urine output, blood pressure (other  measures as available)  - Encourage oral intake as appropriate  - Instruct patient on fluid and nutrition restrictions as appropriate  Outcome: Progressing     Problem: SKIN/TISSUE INTEGRITY - ADULT  Goal: Skin integrity remains intact  Description: INTERVENTIONS  - Assess and document risk factors for pressure ulcer development  - Assess and document skin integrity  - Monitor for areas of redness and/or skin breakdown  - Initiate interventions, skin care algorithm/standards of care as needed  Outcome: Progressing  Goal: Incision(s), wounds(s) or drain site(s) healing without S/S of infection  Description: INTERVENTIONS:  - Assess and document risk factors for pressure ulcer development  - Assess and document skin integrity  - Assess and document dressing/incision, wound bed, drain sites and surrounding tissue  - Implement wound care per orders  - Initiate isolation precautions as appropriate  - Initiate Pressure Ulcer prevention bundle as indicated  Outcome: Progressing  Goal: Oral mucous membranes remain intact  Description: INTERVENTIONS  - Assess oral mucosa and hygiene practices  - Implement preventative oral hygiene regimen  - Implement oral medicated treatments as ordered  Outcome: Progressing     Problem: HEMATOLOGIC - ADULT  Goal: Maintains hematologic stability  Description: INTERVENTIONS  - Assess for signs and symptoms of bleeding or hemorrhage  - Monitor labs and vital signs for trends  - Administer supportive blood products/factors, fluids and medications as ordered and appropriate  - Administer supportive blood products/factors as ordered and appropriate  Outcome: Progressing     Problem: MUSCULOSKELETAL - ADULT  Goal: Return mobility to safest level of function  Description: INTERVENTIONS:  - Assess patient stability and activity tolerance for standing, transferring and ambulating w/ or w/o assistive devices  - Assist with transfers and ambulation using safe patient handling equipment as needed  -  Ensure adequate protection for wounds/incisions during mobilization  - Obtain PT/OT consults as needed  - Advance activity as appropriate  - Communicate ordered activity level and limitations with patient/family  Outcome: Progressing  Goal: Maintain proper alignment of affected body part  Description: INTERVENTIONS:  - Support and protect limb and body alignment per provider's orders  - Instruct and reinforce with patient and family use of appropriate assistive device and precautions (e.g. spinal or hip dislocation precautions)  Outcome: Progressing     Problem: NEUROLOGICAL - ADULT  Goal: Achieves stable or improved neurological status  Description: INTERVENTIONS  - Assess for and report changes in neurological status  - Initiate measures to prevent increased intracranial pressure  - Maintain blood pressure and fluid volume within ordered parameters to optimize cerebral perfusion and minimize risk of hemorrhage  - Monitor temperature, glucose, and sodium. Initiate appropriate interventions as ordered  Outcome: Progressing     Problem: PAIN - ADULT  Goal: Verbalizes/displays adequate comfort level or patient's stated pain goal  Description: INTERVENTIONS:  - Encourage pt to monitor pain and request assistance  - Assess pain using appropriate pain scale  - Administer analgesics based on type and severity of pain and evaluate response  - Implement non-pharmacological measures as appropriate and evaluate response  - Consider cultural and social influences on pain and pain management  - Manage/alleviate anxiety  - Utilize distraction and/or relaxation techniques  - Monitor for opioid side effects  - Notify MD/LIP if interventions unsuccessful or patient reports new pain  - Anticipate increased pain with activity and pre-medicate as appropriate  Outcome: Progressing     Problem: Delirium  Goal: Minimize duration of delirium  Description: Interventions:  - Encourage use of hearing aids, eye glasses  - Promote highest  level of mobility daily  - Provide frequent reorientation  - Promote wakefulness i.e. lights on, blinds open  - Promote sleep, encourage patient's normal rest cycle i.e. lights off, TV off, minimize noise and interruptions  - Encourage family to assist in orientation and promotion of home routines  Outcome: Progressing

## 2024-12-21 NOTE — PLAN OF CARE
Patient trach to vent. Flexiseal in. Loose Bms. On trickle tube feed. On TPN. Has a aguilar. No complaints of pain. CHG bath done. Wound dressing changed.   Problem: Patient Centered Care  Goal: Patient preferences are identified and integrated in the patient's plan of care  Description: Interventions:  - What would you like us to know as we care for you? I work at [employer] and I am still very good friends with people from grade school! My brother, Osbaldo Prince, has been making decisions for me.  - Provide timely, complete, and accurate information to patient/family  - Incorporate patient and family knowledge, values, beliefs, and cultural backgrounds into the planning and delivery of care  - Encourage patient/family to participate in care and decision-making at the level they choose  - Honor patient and family perspectives and choices  Outcome: Progressing     Problem: Diabetes/Glucose Control  Goal: Glucose maintained within prescribed range  Description: INTERVENTIONS:  - Monitor Blood Glucose as ordered  - Assess for signs and symptoms of hyperglycemia and hypoglycemia  - Administer ordered medications to maintain glucose within target range  - Assess barriers to adequate nutritional intake and initiate nutrition consult as needed  - Instruct patient on self management of diabetes  Outcome: Progressing     Problem: Patient/Family Goals  Goal: Patient/Family Long Term Goal  Description: Patient's Long Term Goal: To discharge from the hospital safely    Interventions:  - surgical interventions, rounding, medications  - See additional Care Plan goals for specific interventions  Outcome: Progressing  Goal: Patient/Family Short Term Goal  Description: Patient's Short Term Goal: to breathe better    Interventions:   - manage the ventilator for pt until she is able to breath on her own.  - See additional Care Plan goals for specific interventions  Outcome: Progressing     Problem: CARDIOVASCULAR - ADULT  Goal:  Maintains optimal cardiac output and hemodynamic stability  Description: INTERVENTIONS:  - Monitor vital signs, rhythm, and trends  - Monitor for bleeding, hypotension and signs of decreased cardiac output  - Evaluate effectiveness of vasoactive medications to optimize hemodynamic stability  - Monitor arterial and/or venous puncture sites for bleeding and/or hematoma  - Assess quality of pulses, skin color and temperature  - Assess for signs of decreased coronary artery perfusion - ex. Angina  - Evaluate fluid balance, assess for edema, trend weights  Outcome: Progressing  Goal: Absence of cardiac arrhythmias or at baseline  Description: INTERVENTIONS:  - Continuous cardiac monitoring, monitor vital signs, obtain 12 lead EKG if indicated  - Evaluate effectiveness of antiarrhythmic and heart rate control medications as ordered  - Initiate emergency measures for life threatening arrhythmias  - Monitor electrolytes and administer replacement therapy as ordered  Outcome: Progressing     Problem: RESPIRATORY - ADULT  Goal: Achieves optimal ventilation and oxygenation  Description: INTERVENTIONS:  - Assess for changes in respiratory status  - Assess for changes in mentation and behavior  - Position to facilitate oxygenation and minimize respiratory effort  - Oxygen supplementation based on oxygen saturation or ABGs  - Provide Smoking Cessation handout, if applicable  - Encourage broncho-pulmonary hygiene including cough, deep breathe, Incentive Spirometry  - Assess the need for suctioning and perform as needed  - Assess and instruct to report SOB or any respiratory difficulty  - Respiratory Therapy support as indicated  - Manage/alleviate anxiety  - Monitor for signs/symptoms of CO2 retention  Outcome: Progressing     Problem: GASTROINTESTINAL - ADULT  Goal: Minimal or absence of nausea and vomiting  Description: INTERVENTIONS:  - Maintain adequate hydration with IV or PO as ordered and tolerated  - Nasogastric tube to  low intermittent suction as ordered  - Evaluate effectiveness of ordered antiemetic medications  - Provide nonpharmacologic comfort measures as appropriate  - Advance diet as tolerated, if ordered  - Obtain nutritional consult as needed  - Evaluate fluid balance  Outcome: Progressing  Goal: Maintains or returns to baseline bowel function  Description: INTERVENTIONS:  - Assess bowel function  - Maintain adequate hydration with IV or PO as ordered and tolerated  - Evaluate effectiveness of GI medications  - Encourage mobilization and activity  - Obtain nutritional consult as needed  - Establish a toileting routine/schedule  - Consider collaborating with pharmacy to review patient's medication profile  Outcome: Progressing     Problem: METABOLIC/FLUID AND ELECTROLYTES - ADULT  Goal: Glucose maintained within prescribed range  Description: INTERVENTIONS:  - Monitor Blood Glucose as ordered  - Assess for signs and symptoms of hyperglycemia and hypoglycemia  - Administer ordered medications to maintain glucose within target range  - Assess barriers to adequate nutritional intake and initiate nutrition consult as needed  - Instruct patient on self management of diabetes  Outcome: Progressing  Goal: Electrolytes maintained within normal limits  Description: INTERVENTIONS:  - Monitor labs and rhythm and assess patient for signs and symptoms of electrolyte imbalances  - Administer electrolyte replacement as ordered  - Monitor response to electrolyte replacements, including rhythm and repeat lab results as appropriate  - Fluid restriction as ordered  - Instruct patient on fluid and nutrition restrictions as appropriate  Outcome: Progressing  Goal: Hemodynamic stability and optimal renal function maintained  Description: INTERVENTIONS:  - Monitor labs and assess for signs and symptoms of volume excess or deficit  - Monitor intake, output and patient weight  - Monitor urine specific gravity, serum osmolarity and serum sodium as  indicated or ordered  - Monitor response to interventions for patient's volume status, including labs, urine output, blood pressure (other measures as available)  - Encourage oral intake as appropriate  - Instruct patient on fluid and nutrition restrictions as appropriate  Outcome: Progressing     Problem: SKIN/TISSUE INTEGRITY - ADULT  Goal: Skin integrity remains intact  Description: INTERVENTIONS  - Assess and document risk factors for pressure ulcer development  - Assess and document skin integrity  - Monitor for areas of redness and/or skin breakdown  - Initiate interventions, skin care algorithm/standards of care as needed  Outcome: Progressing  Goal: Incision(s), wounds(s) or drain site(s) healing without S/S of infection  Description: INTERVENTIONS:  - Assess and document risk factors for pressure ulcer development  - Assess and document skin integrity  - Assess and document dressing/incision, wound bed, drain sites and surrounding tissue  - Implement wound care per orders  - Initiate isolation precautions as appropriate  - Initiate Pressure Ulcer prevention bundle as indicated  Outcome: Progressing  Goal: Oral mucous membranes remain intact  Description: INTERVENTIONS  - Assess oral mucosa and hygiene practices  - Implement preventative oral hygiene regimen  - Implement oral medicated treatments as ordered  Outcome: Progressing     Problem: HEMATOLOGIC - ADULT  Goal: Maintains hematologic stability  Description: INTERVENTIONS  - Assess for signs and symptoms of bleeding or hemorrhage  - Monitor labs and vital signs for trends  - Administer supportive blood products/factors, fluids and medications as ordered and appropriate  - Administer supportive blood products/factors as ordered and appropriate  Outcome: Progressing     Problem: MUSCULOSKELETAL - ADULT  Goal: Return mobility to safest level of function  Description: INTERVENTIONS:  - Assess patient stability and activity tolerance for standing,  transferring and ambulating w/ or w/o assistive devices  - Assist with transfers and ambulation using safe patient handling equipment as needed  - Ensure adequate protection for wounds/incisions during mobilization  - Obtain PT/OT consults as needed  - Advance activity as appropriate  - Communicate ordered activity level and limitations with patient/family  Outcome: Progressing  Goal: Maintain proper alignment of affected body part  Description: INTERVENTIONS:  - Support and protect limb and body alignment per provider's orders  - Instruct and reinforce with patient and family use of appropriate assistive device and precautions (e.g. spinal or hip dislocation precautions)  Outcome: Progressing     Problem: NEUROLOGICAL - ADULT  Goal: Achieves stable or improved neurological status  Description: INTERVENTIONS  - Assess for and report changes in neurological status  - Initiate measures to prevent increased intracranial pressure  - Maintain blood pressure and fluid volume within ordered parameters to optimize cerebral perfusion and minimize risk of hemorrhage  - Monitor temperature, glucose, and sodium. Initiate appropriate interventions as ordered  Outcome: Progressing     Problem: PAIN - ADULT  Goal: Verbalizes/displays adequate comfort level or patient's stated pain goal  Description: INTERVENTIONS:  - Encourage pt to monitor pain and request assistance  - Assess pain using appropriate pain scale  - Administer analgesics based on type and severity of pain and evaluate response  - Implement non-pharmacological measures as appropriate and evaluate response  - Consider cultural and social influences on pain and pain management  - Manage/alleviate anxiety  - Utilize distraction and/or relaxation techniques  - Monitor for opioid side effects  - Notify MD/LIP if interventions unsuccessful or patient reports new pain  - Anticipate increased pain with activity and pre-medicate as appropriate  Outcome: Progressing      Problem: Delirium  Goal: Minimize duration of delirium  Description: Interventions:  - Encourage use of hearing aids, eye glasses  - Promote highest level of mobility daily  - Provide frequent reorientation  - Promote wakefulness i.e. lights on, blinds open  - Promote sleep, encourage patient's normal rest cycle i.e. lights off, TV off, minimize noise and interruptions  - Encourage family to assist in orientation and promotion of home routines  Outcome: Progressing

## 2024-12-22 LAB
ANION GAP SERPL CALC-SCNC: 7 MMOL/L (ref 0–18)
BUN BLD-MCNC: 35 MG/DL (ref 9–23)
BUN/CREAT SERPL: 23.3 (ref 10–20)
CALCIUM BLD-MCNC: 9.3 MG/DL (ref 8.7–10.4)
CHLORIDE SERPL-SCNC: 103 MMOL/L (ref 98–112)
CO2 SERPL-SCNC: 28 MMOL/L (ref 21–32)
CREAT BLD-MCNC: 1.5 MG/DL
EGFRCR SERPLBLD CKD-EPI 2021: 39 ML/MIN/1.73M2 (ref 60–?)
GLUCOSE BLD-MCNC: 130 MG/DL (ref 70–99)
GLUCOSE BLDC GLUCOMTR-MCNC: 127 MG/DL (ref 70–99)
GLUCOSE BLDC GLUCOMTR-MCNC: 137 MG/DL (ref 70–99)
GLUCOSE BLDC GLUCOMTR-MCNC: 145 MG/DL (ref 70–99)
MAGNESIUM SERPL-MCNC: 2.1 MG/DL (ref 1.6–2.6)
OSMOLALITY SERPL CALC.SUM OF ELEC: 296 MOSM/KG (ref 275–295)
PHOSPHATE SERPL-MCNC: 3.2 MG/DL (ref 2.4–5.1)
POTASSIUM SERPL-SCNC: 3.9 MMOL/L (ref 3.5–5.1)
SODIUM SERPL-SCNC: 138 MMOL/L (ref 136–145)

## 2024-12-22 PROCEDURE — 99233 SBSQ HOSP IP/OBS HIGH 50: CPT | Performed by: INTERNAL MEDICINE

## 2024-12-22 PROCEDURE — 99233 SBSQ HOSP IP/OBS HIGH 50: CPT | Performed by: STUDENT IN AN ORGANIZED HEALTH CARE EDUCATION/TRAINING PROGRAM

## 2024-12-22 RX ORDER — CLONIDINE 0.3 MG/24H
1 PATCH, EXTENDED RELEASE TRANSDERMAL WEEKLY
Qty: 4 PATCH | Refills: 0 | Status: SHIPPED | OUTPATIENT
Start: 2024-12-25

## 2024-12-22 RX ORDER — ISOSORBIDE DINITRATE 40 MG/1
40 TABLET ORAL
Qty: 90 TABLET | Refills: 0 | Status: SHIPPED | OUTPATIENT
Start: 2024-12-23 | End: 2025-02-03 | Stop reason: ALTCHOICE

## 2024-12-22 RX ORDER — ALBUTEROL SULFATE 0.83 MG/ML
2.5 SOLUTION RESPIRATORY (INHALATION) EVERY 6 HOURS PRN
Qty: 1 EACH | Refills: 0 | Status: SHIPPED | OUTPATIENT
Start: 2024-12-22

## 2024-12-22 RX ORDER — HEPARIN SODIUM 5000 [USP'U]/ML
5000 INJECTION, SOLUTION INTRAVENOUS; SUBCUTANEOUS EVERY 8 HOURS SCHEDULED
Status: SHIPPED | COMMUNITY
Start: 2024-12-22 | End: 2025-02-03

## 2024-12-22 RX ORDER — HYDRALAZINE HYDROCHLORIDE 50 MG/1
150 TABLET, FILM COATED ORAL EVERY 8 HOURS SCHEDULED
Qty: 90 TABLET | Refills: 0 | Status: SHIPPED | OUTPATIENT
Start: 2024-12-22 | End: 2025-02-03 | Stop reason: DRUGHIGH

## 2024-12-22 RX ORDER — CARVEDILOL 12.5 MG/1
37.5 TABLET ORAL 2 TIMES DAILY WITH MEALS
Qty: 60 TABLET | Refills: 0 | Status: SHIPPED | OUTPATIENT
Start: 2024-12-23 | End: 2025-02-03

## 2024-12-22 RX ORDER — ASPIRIN 81 MG/1
81 TABLET, CHEWABLE ORAL DAILY
Qty: 30 TABLET | Refills: 0 | Status: SHIPPED | OUTPATIENT
Start: 2024-12-23 | End: 2025-02-03

## 2024-12-22 RX ORDER — MIRTAZAPINE 7.5 MG/1
7.5 TABLET, FILM COATED ORAL NIGHTLY
Qty: 30 TABLET | Refills: 0 | Status: SHIPPED | OUTPATIENT
Start: 2024-12-22

## 2024-12-22 RX ORDER — METOCLOPRAMIDE HYDROCHLORIDE 5 MG/ML
5 INJECTION INTRAMUSCULAR; INTRAVENOUS EVERY 6 HOURS
Qty: 120 EACH | Refills: 0 | Status: SHIPPED | OUTPATIENT
Start: 2024-12-22 | End: 2024-12-23

## 2024-12-22 NOTE — PLAN OF CARE
T collar 10L 40% fio2 for 3 hours and 20 minutes tolerating it well.   Problem: Patient Centered Care  Goal: Patient preferences are identified and integrated in the patient's plan of care  Description: Interventions:  - What would you like us to know as we care for you? I work at [employer] and I am still very good friends with people from grade school! My brother, Osbaldo Prince, has been making decisions for me.  - Provide timely, complete, and accurate information to patient/family  - Incorporate patient and family knowledge, values, beliefs, and cultural backgrounds into the planning and delivery of care  - Encourage patient/family to participate in care and decision-making at the level they choose  - Honor patient and family perspectives and choices  Outcome: Progressing     Problem: Diabetes/Glucose Control  Goal: Glucose maintained within prescribed range  Description: INTERVENTIONS:  - Monitor Blood Glucose as ordered  - Assess for signs and symptoms of hyperglycemia and hypoglycemia  - Administer ordered medications to maintain glucose within target range  - Assess barriers to adequate nutritional intake and initiate nutrition consult as needed  - Instruct patient on self management of diabetes  Outcome: Progressing     Problem: Patient/Family Goals  Goal: Patient/Family Long Term Goal  Description: Patient's Long Term Goal: To discharge from the hospital safely    Interventions:  - surgical interventions, rounding, medications  - See additional Care Plan goals for specific interventions  Outcome: Progressing  Goal: Patient/Family Short Term Goal  Description: Patient's Short Term Goal: to breathe better    Interventions:   - manage the ventilator for pt until she is able to breath on her own.  - See additional Care Plan goals for specific interventions  Outcome: Progressing     Problem: CARDIOVASCULAR - ADULT  Goal: Maintains optimal cardiac output and hemodynamic stability  Description:  INTERVENTIONS:  - Monitor vital signs, rhythm, and trends  - Monitor for bleeding, hypotension and signs of decreased cardiac output  - Evaluate effectiveness of vasoactive medications to optimize hemodynamic stability  - Monitor arterial and/or venous puncture sites for bleeding and/or hematoma  - Assess quality of pulses, skin color and temperature  - Assess for signs of decreased coronary artery perfusion - ex. Angina  - Evaluate fluid balance, assess for edema, trend weights  Outcome: Progressing  Goal: Absence of cardiac arrhythmias or at baseline  Description: INTERVENTIONS:  - Continuous cardiac monitoring, monitor vital signs, obtain 12 lead EKG if indicated  - Evaluate effectiveness of antiarrhythmic and heart rate control medications as ordered  - Initiate emergency measures for life threatening arrhythmias  - Monitor electrolytes and administer replacement therapy as ordered  Outcome: Progressing     Problem: RESPIRATORY - ADULT  Goal: Achieves optimal ventilation and oxygenation  Description: INTERVENTIONS:  - Assess for changes in respiratory status  - Assess for changes in mentation and behavior  - Position to facilitate oxygenation and minimize respiratory effort  - Oxygen supplementation based on oxygen saturation or ABGs  - Provide Smoking Cessation handout, if applicable  - Encourage broncho-pulmonary hygiene including cough, deep breathe, Incentive Spirometry  - Assess the need for suctioning and perform as needed  - Assess and instruct to report SOB or any respiratory difficulty  - Respiratory Therapy support as indicated  - Manage/alleviate anxiety  - Monitor for signs/symptoms of CO2 retention  Outcome: Progressing     Problem: GASTROINTESTINAL - ADULT  Goal: Minimal or absence of nausea and vomiting  Description: INTERVENTIONS:  - Maintain adequate hydration with IV or PO as ordered and tolerated  - Nasogastric tube to low intermittent suction as ordered  - Evaluate effectiveness of ordered  antiemetic medications  - Provide nonpharmacologic comfort measures as appropriate  - Advance diet as tolerated, if ordered  - Obtain nutritional consult as needed  - Evaluate fluid balance  Outcome: Progressing  Goal: Maintains or returns to baseline bowel function  Description: INTERVENTIONS:  - Assess bowel function  - Maintain adequate hydration with IV or PO as ordered and tolerated  - Evaluate effectiveness of GI medications  - Encourage mobilization and activity  - Obtain nutritional consult as needed  - Establish a toileting routine/schedule  - Consider collaborating with pharmacy to review patient's medication profile  Outcome: Progressing     Problem: METABOLIC/FLUID AND ELECTROLYTES - ADULT  Goal: Glucose maintained within prescribed range  Description: INTERVENTIONS:  - Monitor Blood Glucose as ordered  - Assess for signs and symptoms of hyperglycemia and hypoglycemia  - Administer ordered medications to maintain glucose within target range  - Assess barriers to adequate nutritional intake and initiate nutrition consult as needed  - Instruct patient on self management of diabetes  Outcome: Progressing  Goal: Electrolytes maintained within normal limits  Description: INTERVENTIONS:  - Monitor labs and rhythm and assess patient for signs and symptoms of electrolyte imbalances  - Administer electrolyte replacement as ordered  - Monitor response to electrolyte replacements, including rhythm and repeat lab results as appropriate  - Fluid restriction as ordered  - Instruct patient on fluid and nutrition restrictions as appropriate  Outcome: Progressing  Goal: Hemodynamic stability and optimal renal function maintained  Description: INTERVENTIONS:  - Monitor labs and assess for signs and symptoms of volume excess or deficit  - Monitor intake, output and patient weight  - Monitor urine specific gravity, serum osmolarity and serum sodium as indicated or ordered  - Monitor response to interventions for patient's  volume status, including labs, urine output, blood pressure (other measures as available)  - Encourage oral intake as appropriate  - Instruct patient on fluid and nutrition restrictions as appropriate  Outcome: Progressing     Problem: SKIN/TISSUE INTEGRITY - ADULT  Goal: Skin integrity remains intact  Description: INTERVENTIONS  - Assess and document risk factors for pressure ulcer development  - Assess and document skin integrity  - Monitor for areas of redness and/or skin breakdown  - Initiate interventions, skin care algorithm/standards of care as needed  Outcome: Progressing  Goal: Incision(s), wounds(s) or drain site(s) healing without S/S of infection  Description: INTERVENTIONS:  - Assess and document risk factors for pressure ulcer development  - Assess and document skin integrity  - Assess and document dressing/incision, wound bed, drain sites and surrounding tissue  - Implement wound care per orders  - Initiate isolation precautions as appropriate  - Initiate Pressure Ulcer prevention bundle as indicated  Outcome: Progressing  Goal: Oral mucous membranes remain intact  Description: INTERVENTIONS  - Assess oral mucosa and hygiene practices  - Implement preventative oral hygiene regimen  - Implement oral medicated treatments as ordered  Outcome: Progressing     Problem: HEMATOLOGIC - ADULT  Goal: Maintains hematologic stability  Description: INTERVENTIONS  - Assess for signs and symptoms of bleeding or hemorrhage  - Monitor labs and vital signs for trends  - Administer supportive blood products/factors, fluids and medications as ordered and appropriate  - Administer supportive blood products/factors as ordered and appropriate  Outcome: Progressing     Problem: MUSCULOSKELETAL - ADULT  Goal: Return mobility to safest level of function  Description: INTERVENTIONS:  - Assess patient stability and activity tolerance for standing, transferring and ambulating w/ or w/o assistive devices  - Assist with transfers  and ambulation using safe patient handling equipment as needed  - Ensure adequate protection for wounds/incisions during mobilization  - Obtain PT/OT consults as needed  - Advance activity as appropriate  - Communicate ordered activity level and limitations with patient/family  Outcome: Progressing  Goal: Maintain proper alignment of affected body part  Description: INTERVENTIONS:  - Support and protect limb and body alignment per provider's orders  - Instruct and reinforce with patient and family use of appropriate assistive device and precautions (e.g. spinal or hip dislocation precautions)  Outcome: Progressing     Problem: NEUROLOGICAL - ADULT  Goal: Achieves stable or improved neurological status  Description: INTERVENTIONS  - Assess for and report changes in neurological status  - Initiate measures to prevent increased intracranial pressure  - Maintain blood pressure and fluid volume within ordered parameters to optimize cerebral perfusion and minimize risk of hemorrhage  - Monitor temperature, glucose, and sodium. Initiate appropriate interventions as ordered  Outcome: Progressing     Problem: PAIN - ADULT  Goal: Verbalizes/displays adequate comfort level or patient's stated pain goal  Description: INTERVENTIONS:  - Encourage pt to monitor pain and request assistance  - Assess pain using appropriate pain scale  - Administer analgesics based on type and severity of pain and evaluate response  - Implement non-pharmacological measures as appropriate and evaluate response  - Consider cultural and social influences on pain and pain management  - Manage/alleviate anxiety  - Utilize distraction and/or relaxation techniques  - Monitor for opioid side effects  - Notify MD/LIP if interventions unsuccessful or patient reports new pain  - Anticipate increased pain with activity and pre-medicate as appropriate  Outcome: Progressing     Problem: Delirium  Goal: Minimize duration of delirium  Description: Interventions:  -  Encourage use of hearing aids, eye glasses  - Promote highest level of mobility daily  - Provide frequent reorientation  - Promote wakefulness i.e. lights on, blinds open  - Promote sleep, encourage patient's normal rest cycle i.e. lights off, TV off, minimize noise and interruptions  - Encourage family to assist in orientation and promotion of home routines  Outcome: Progressing

## 2024-12-22 NOTE — RESPIRATORY THERAPY NOTE
Pt received on vent:       12/22/24 0220   Vent Information   Vent Mode PRVC/AC   Settings   FiO2 (%) 30 %   Resp Rate (Set) 18   Vt (Set, mL) 550 mL   Waveform Decelerating ramp   PEEP/CPAP (cm H2O) 5 cm H20     Bilateral BS auscultated, suction  and trach care provided as needed. NO changes made. RT will monitor.

## 2024-12-22 NOTE — PROGRESS NOTES
CV surgery     Patient on T piece tolerating well.   Plan for LTAC when available.   Please ensure patient back on vent overnight to avoid overly fatiguing patient.     ALBERT Villarreal MD

## 2024-12-22 NOTE — PROGRESS NOTES
Progress Note     Alisha Wilde Patient Status:  Inpatient    10/25/1965 MRN [id number]   Location [facility] 2W/SW Attending Stefani Hsu MD   Hosp Day # 29 PCP Bridger Avendano MD     Subjective:   S: Patient seen and examined this morning. Flexiseal in place for loose stools. Tfs advanced.   On vent via trach.     Review of Systems:   10 point ROS completed and was negative, except for pertinent positive and negatives stated in subjective.    Objective:   Vital signs:  Temp:  [98 °F (36.7 °C)-98.4 °F (36.9 °C)] 98.3 °F (36.8 °C)  Pulse:  [70-94] 83  Resp:  [15-23] 18  BP: (103-154)/(54-75) 148/71  SpO2:  [96 %-100 %] 98 %  FiO2 (%):  [30 %] 30 %    Wt Readings from Last 6 Encounters:   24 290 lb 4.8 oz (131.7 kg)   10/24/24 254 lb (115.2 kg)   24 249 lb (112.9 kg)   24 249 lb (112.9 kg)   23 249 lb (112.9 kg)   10/09/23 248 lb (112.5 kg)         Physical Exam:    General: AAF, NAD Alert , Tracheostomy in place,     , morbidly obese  HEENT: trach in place   Respiratory: Clear to auscultation bilaterally. No wheezes. No rhonchi.  Cardiovascular: S1, S2. Regular rate and rhythm. No murmurs, rubs or gallops.   Abdomen: Distended, hypoactive BS; PEG in place   : left groin wound (refer wound care notes)  Neurologic: No focal neurological deficits.   Musculoskeletal: Moves all extremities.  Extremities: No edema.    Results:   Diagnostic Data:      Labs:    Labs Last 24 Hours:   BMP     CBC    Other     Na 140 Cl 105 BUN 32 Glu 136   Hb -   PTT - Procal -   K 4.3 CO2 27.0 Cr 1.52   WBC - >< PLT -  INR - CRP -   Renal Lytes Endo    Hct -   Trop - D dim -   eGFR - Ca 9.3 POC Gluc  174    LFT   pBNP - Lactic -   eGFR AA - PO4 3.6 A1c -   AST - APk - Prot -  LDL -     Mg 2.2 TSH -   ALT - T miranda - Alb -        COVID-19 Lab Results    COVID-19  Lab Results   Component Value Date    COVID19 Not Detected 2024    COVID19 Not Detected 08/10/2023    COVID19 Not Detected 2022        Pro-Calcitonin  No results for input(s): \"PCT\" in the last 168 hours.    Cardiac  No results for input(s): \"TROP\", \"PBNP\" in the last 168 hours.    Creatinine Kinase  No results for input(s): \"CK\" in the last 168 hours.    Inflammatory Markers  No results for input(s): \"CRP\", \"NATALIA\", \"LDH\", \"DDIMER\" in the last 168 hours.    Imaging: Imaging data reviewed in Epic.    Medications:    metoclopramide  5 mg Intravenous Q6H    collagenase   Topical Daily    lidocaine-menthol  1 patch Transdermal Daily    pantoprazole  40 mg Intravenous Q12H    mirtazapine  7.5 mg Oral Nightly    furosemide  40 mg Intravenous BID (Diuretic)    LORazepam  0.5 mg Intravenous QID    cloNIDine  1 patch Transdermal Weekly    carvedilol  37.5 mg Oral BID with meals    aspirin  81 mg Peg Tube Daily    isosorbide dinitrate  40 mg Per NG Tube TID (Nitrates)    hydrALAZINE  150 mg Oral Q8H GABRIEL    amLODIPine  10 mg Oral Daily    heparin  5,000 Units Subcutaneous Q8H GABRIEL    chlorhexidine gluconate  15 mL Mouth/Throat BID       Assessment & Plan:   ASSESSMENT / PLAN:     58 y/o Morbid obesity, hypertension, gastroesophageal reflux disease, chronic kidney disease stage 2 to 3, and hyperlipidemia admitted on 11/21 for Chest pain -CT CT angiogram of the chest, abdomen, and pelvis rule out dissection showed type A aortic dissection s/p emergent repair 11/22/24 , transferred to ICU post op intubated on 11/22, failed SBT, s/p trach 12/4,  PEG 12/5, completed 10 days of abx zsecondary to aspiration event , no with ileus and Abx Vanc and Zosyn resumed for aspiration pneumonia       Ileus   -Gen surgery and GI following   -PEG tube to LIS   -possibly will need NG   -Continue IV abx         Type A aortic dissection   -s/p emergent repair now intubated in ICU  -on labetalol gtt. Nicardipine stopped 12/18/24  - cardiothoracic with goal SBP <140  -multiple oral antiHTN meds for BP now starting to be given since GI recommended start trickle feeds  -s/p pRBC  transfusion 12/14/24 for anemia. No obvious s/s bleeding  -CV surgery, cards and critical care on consult.   TTE LVEF 75-80%.        Ileus   -On IV Reglan- montior QT interval   - continue bowel regimen- miralax bid, tap water enemas- GI on board   Will order another CT scan to see if there is possible obstruction  New picc 12/2       - Plan is to eventually discharge to LTAC  PEG 12/5-  ok for meds, starting trickle feeds, keep TPN for now but wean as able per dietitian.   Weaning antihypertensives, kub improved     Acute hypoxic respiratory failure   Aspiration event post-op  Sputum cx candida - likely contaminant   Completed 10 days of zosyn.   Failed SBT multiple times. S/p trach 12/4 and PEG 12/5   ENT and GI on consult.  Pulmonary on consult.   CXR with multifocal airspace dz-completed another 7 days zosyn per pulm for recurrent aspiration   Albuterol nebs   Seroquel started to help with agitation 50 mg BID  -daily sbt per pulm      H/o tobacco and ETOH abuse   Duonebs PRN   CIWA protocol     NAIMA on CKD III   Volume overload  Renal on consult.   Canaan to be secondary to MARY LOU/ATN   Off lasix drip now on IV BID lasix. Stable. +metolazone today  Doppler renal ultrasound unremarkable for renal artery stenosis     Essential HTN   Coreg 25 mg BID, norvasc 10mg daily, hydralazine 150mg TID, Clonidinie patch 0.2mg TID, isosobide 150-40 mg TID   On lasix  Webb in place   ARB on hold due to NAIMA.   Off labetalol , off cardene now     Iron def anemia  IV iron.   Iron level low   Transfuse for Hb < 7.0      Other medical problems:  Morbid Obesity   TANYA   Left groin wound - cont wound care       Quality:  DVT Prophylaxis: Heparin   CODE status: FULL   DISPO: LTAC soon-  once off TPN, tolerating feeds, and off IV bp meds.         MDM: High   I personally spent time on chart/note review, review of labs/imaging, discussion with patient, physical exam, discussion with staff, consultants, coordinating care, writing progress note,  and discussion of plan of care.      Stefani Hsu MD    Supplementary Documentation:

## 2024-12-22 NOTE — PROGRESS NOTES
Cardiology Progress Note    Alisha Wilde Patient Status:  Inpatient    10/25/1965 MRN [id number]   Location [facility] 2W/SW Attending Stefani Hsu MD   Hosp Day # 30 PCP Bridger Avendano MD     Interval Note:  Patient seen and examined  Awake, following commands  Remains on ventilator/trach        --------------------------------------------------------------------------------------------------------------------------------  ROS 12 systems reviewed, pertinent findings above.  ROS    History:  Past Medical History:    Chronic kidney disease (CKD)    Esophageal reflux    High blood pressure    High cholesterol    HYPERTENSION    Hypertension    Unspecified essential hypertension     Past Surgical History:   Procedure Laterality Date    Colonoscopy N/A 2018    Procedure: COLONOSCOPY;  Surgeon: Magdy Webber MD;  Location: [facility] ENDOSCOPY    Endometrial ablation      child passed away for 5 mins          1    Other surgical history  11    cysto-Dr. Lacey -- pt denies     Family History   Problem Relation Age of Onset    Hypertension Mother     Heart Disorder Mother 70    Other (Other) Mother         kidney failure    Hypertension Father     Hypertension Maternal Grandfather     Cancer Neg     Diabetes Neg     Glaucoma Neg     Macular degeneration Neg       reports that she quit smoking about 25 years ago. Her smoking use included cigarettes. She started smoking about 39 years ago. She has a 13 pack-year smoking history. She has quit using smokeless tobacco. She reports current alcohol use of about 1.0 - 2.0 standard drink of alcohol per week. She reports that she does not use drugs.    Objective:   Temp: 98.2 °F (36.8 °C)  Pulse: 78  Resp: 17  BP: 122/60  FiO2 (%): 30 %    Intake/Output:     Intake/Output Summary (Last 24 hours) at 2024 1652  Last data filed at 2024 1600  Gross per 24 hour   Intake 1656.84 ml   Output 3955 ml   Net -2298.16 ml       Physical  Exam:    General: Awake, following commands-on trach/vent  HEENT: Normocephalic, anicteric sclera, neck supple.  Neck: No JVD, carotids 2+, no bruits.  Cardiac: Regular rate and rhythm. S1, S2 normal. No murmur, pericardial rub, S3.  Lungs: Clear without wheezes, rales, rhonchi or dullness.  Normal excursions and effort.  Abdomen: Soft, non-tender. BS-present.  Extremities: Without clubbing, cyanosis or edema.  Peripheral pulses are 2+.  Neurologic: Non-focal  Skin: Warm and dry.       Assessment   Hypoxic respiratory failure status post trach, on vent  Multiple episodes of aspiration, aspiration pneumonia  Likely ileus  Type 1A aortic dissection status post emergent repair  CKD  History of alcohol abuse  Hypertension  Volume overload      Plan  Vitals acceptable  Continue Norvasc, Coreg, clonidine patch, hydralazine, Isordil  Good response to IV diuretics, good response to 5 mg metolazone dose yesterday as well  Continue IV Lasix    Thank you for allowing me to take part in the care of Alisha Wilde. Please call with any questions of concerns.    Level of care: L3    Don Beth DO  [facility]   Interventional Cardiac and Vascular Services

## 2024-12-22 NOTE — PROGRESS NOTES
[facility]  part of [facility]    Progress Note    Alisha Wilde Patient Status:  Inpatient    10/25/1965 MRN [id number]   Location [facility] 2W/SW Attending Stefani Hsu MD   Hosp Day # 30 PCP Bridger Avendano MD       Subjective:   Subjective:  Patient was seen and examined  Comfortable on the vent via trach  Minimal oral and endotracheal secretion  Tolerating G-tube feeding and increasing gradually  Moving extremities and following commands  Objective:   Blood pressure 123/63, pulse 78, temperature 98.4 °F (36.9 °C), temperature source Axillary, resp. rate 17, height 5' 5\" (1.651 m), weight 290 lb 4.8 oz (131.7 kg), SpO2 95%, not currently breastfeeding.  Physical Exam  Vitals and nursing note reviewed.   Constitutional:       General: She is not in acute distress.     Appearance: She is obese. She is ill-appearing.   HENT:      Head: Atraumatic.      Nose: Nose normal.      Mouth/Throat:      Mouth: Mucous membranes are moist.   Eyes:      General: No scleral icterus.  Cardiovascular:      Rate and Rhythm: Normal rate.      Heart sounds: No murmur heard.     No gallop.   Pulmonary:      Comments: Good air exchange to both lungs with minimal basilar rales with no wheezes or rhonchi  Abdominal:      General: Abdomen is flat. Bowel sounds are normal.      Palpations: Abdomen is soft.      Tenderness: There is no guarding or rebound.   Musculoskeletal:      Cervical back: Normal range of motion.      Right lower leg: Edema present.   Neurological:      General: No focal deficit present.      Mental Status: She is oriented to person, place, and time. Mental status is at baseline.         Results:   Lab Results   Component Value Date    WBC 9.9 2024    HGB 8.2 (L) 2024    HCT 25.2 (L) 2024    .0 2024    CREATSERUM 1.50 (H) 2024    BUN 35 (H) 2024     2024    K 3.9 2024     2024    CO2 28.0 2024      (H) 12/22/2024    CA 9.3 12/22/2024    ALB 3.2 12/15/2024    ALKPHO 68 12/15/2024    BILT 0.6 12/15/2024    TP 5.7 12/15/2024    AST 25 12/15/2024    ALT 23 12/15/2024    PTT 30.7 12/04/2024    INR 1.17 12/04/2024    TSH 2.085 12/07/2024    NATHALIE 99 11/25/2024    LIP 28 11/25/2024    DDIMER 0.42 12/08/2019    ESRML 15 06/05/2021    MG 2.1 12/22/2024    PHOS 3.2 12/22/2024    TROP <0.045 12/08/2019    TROPHS 12 11/21/2024     (H) 09/23/2021    B12 1,156 (H) 07/23/2018         Assessment & Plan:       1-Type A aortic dissection s/p repair  Complicated course and now with a trach and PEG  Blood pressure now better uncontrolled with multiple BP meds per G-tube        2-Post op acute respiratory failure  Failed extubation status post trach and PEG  Doing well on the vent via trach with 30% FiO2 and PEEP of 5  Mild tracheal secretion and stable  Daily weaning trials /tolerating CPAP/pressure support and hopefully to transition to trach collar       3-Previous hx of smoking and ETOH  -continue duonebs PRN     4-NAIMA on CKD   Per renal      5-nutrition  Per G-tube /tolerating     6-morbid obesity with a BMI of 48 and TANYA  Outpatient with a trach     7-DVT prophylaxis  Heparin subcu       Okay from pulm perspective to transfer to LTAC               Rg Wilkerson MD  12/22/2024

## 2024-12-23 VITALS
HEART RATE: 85 BPM | HEIGHT: 65 IN | TEMPERATURE: 99 F | BODY MASS INDEX: 48.78 KG/M2 | WEIGHT: 292.75 LBS | SYSTOLIC BLOOD PRESSURE: 122 MMHG | OXYGEN SATURATION: 97 % | RESPIRATION RATE: 16 BRPM | DIASTOLIC BLOOD PRESSURE: 70 MMHG

## 2024-12-23 LAB
GLUCOSE BLDC GLUCOMTR-MCNC: 125 MG/DL (ref 70–99)
GLUCOSE BLDC GLUCOMTR-MCNC: 131 MG/DL (ref 70–99)
GLUCOSE BLDC GLUCOMTR-MCNC: 137 MG/DL (ref 70–99)

## 2024-12-23 PROCEDURE — 99232 SBSQ HOSP IP/OBS MODERATE 35: CPT | Performed by: OTHER

## 2024-12-23 PROCEDURE — 99233 SBSQ HOSP IP/OBS HIGH 50: CPT | Performed by: INTERNAL MEDICINE

## 2024-12-23 PROCEDURE — 99239 HOSP IP/OBS DSCHRG MGMT >30: CPT | Performed by: HOSPITALIST

## 2024-12-23 RX ORDER — METOCLOPRAMIDE 5 MG/1
5 TABLET ORAL EVERY 6 HOURS PRN
Status: SHIPPED | COMMUNITY
Start: 2024-12-23 | End: 2025-02-03

## 2024-12-23 RX ORDER — FUROSEMIDE 20 MG/1
40 TABLET ORAL DAILY
Status: SHIPPED | COMMUNITY
Start: 2024-12-23 | End: 2025-02-03 | Stop reason: ALTCHOICE

## 2024-12-23 RX ORDER — LORAZEPAM 0.5 MG/1
0.5 TABLET ORAL 3 TIMES DAILY
Status: DISCONTINUED | OUTPATIENT
Start: 2024-12-23 | End: 2024-12-23

## 2024-12-23 RX ORDER — LORAZEPAM 0.5 MG/1
0.5 TABLET ORAL 3 TIMES DAILY
Qty: 90 TABLET | Refills: 1 | Status: SHIPPED | OUTPATIENT
Start: 2024-12-23 | End: 2025-02-03

## 2024-12-23 NOTE — PROGRESS NOTES
[facility]  part of [facility]    Progress Note    Alisha Wilde Patient Status:  Inpatient    10/25/1965 MRN [id number]   Location [facility] 2W/SW Attending Stefani Hsu MD   Hosp Day # 31 PCP Bridger Avendano MD     Subjective:  In bed on exam, awake/alert, calm and appropriate.  Currently on T-piece, tolerating well.  Tolerating goal tube feeds.  She currently denies: SOB, CP.  No acute events noted overnight.  No visitors at bedside currently    Objective:  /70 (BP Location: Left arm)   Pulse 76   Temp 98.9 °F (37.2 °C) (Temporal)   Resp 11   Ht 5' 5\" (1.651 m)   Wt 292 lb 12.3 oz (132.8 kg)   SpO2 98%   BMI 48.72 kg/m²     Temp (24hrs), Av.4 °F (36.9 °C), Min:98.1 °F (36.7 °C), Max:98.9 °F (37.2 °C)  Telemetry-NSR    Intake/Output:    Intake/Output Summary (Last 24 hours) at 2024 0845  Last data filed at 2024 0600  Gross per 24 hour   Intake 1233 ml   Output 4475 ml   Net -3242 ml       Wt Readings from Last 3 Encounters:   24 292 lb 12.3 oz (132.8 kg)   10/24/24 254 lb (115.2 kg)   24 249 lb (112.9 kg)       Allergies:  Allergies[1]      Physical Exam:  Blood pressure 136/70, pulse 76, temperature 98.9 °F (37.2 °C), temperature source Temporal, resp. rate 11, height 5' 5\" (1.651 m), weight 292 lb 12.3 oz (132.8 kg), SpO2 98%, not currently breastfeeding.  General: NAD  Neck: Unable to assess due to body habitus/ + trach  Lungs: Slightly diminished laterally, otherwise CTA  Heart: RRR, S1, S2  Abdomen: Soft, NT/ND, BS+x4, PEG, +stool  Extremities: Warm, dry, trace-1+ edema-improved greatly  Pulses: 1-2+ bilat DP  Skin: sternotomy stable, incision CDI.  L groin Mepilex intact-see wound care for details  Neurological:  AAOx3, MAEW, calm/cooperative    Assessment/Plan:  POD # 31 s/p emergent aortic dissection repair  Remains hemodynamically stable for cardiac standpoint.  Hypertension management per cardiology, stable off IV  antihypertensives.  Maintain SBP less than 140  Vent management per critical care/pulmonary -currently tolerating T-piece  PEG-tolerating goal TF, management per GI  Jon replaced 12/20 secondary to retention    Medically stable from [facility] standpoint to DC to rehab today if bed available.  Appreciate SW to assist with planning.  DC appointments as directed.    Katiuska Randle, APRN  12/23/2024  09:00       [1]   Allergies  Allergen Reactions    Atorvastatin MYALGIA    Pravastatin MYALGIA    Rosuvastatin MYALGIA    Seasonal Runny nose

## 2024-12-23 NOTE — PLAN OF CARE
Moderate pain endorsed from patient from rectal tube. PRN medications given with fair effect. Repositioned frequently for comfort. Tolerating ventilator without issue.    Problem: Patient Centered Care  Goal: Patient preferences are identified and integrated in the patient's plan of care  Description: Interventions:  - What would you like us to know as we care for you? I work at [employer] and I am still very good friends with people from grade school! My brother, Osbaldo Prince, has been making decisions for me.  - Provide timely, complete, and accurate information to patient/family  - Incorporate patient and family knowledge, values, beliefs, and cultural backgrounds into the planning and delivery of care  - Encourage patient/family to participate in care and decision-making at the level they choose  - Honor patient and family perspectives and choices  Outcome: Progressing     Problem: Diabetes/Glucose Control  Goal: Glucose maintained within prescribed range  Description: INTERVENTIONS:  - Monitor Blood Glucose as ordered  - Assess for signs and symptoms of hyperglycemia and hypoglycemia  - Administer ordered medications to maintain glucose within target range  - Assess barriers to adequate nutritional intake and initiate nutrition consult as needed  - Instruct patient on self management of diabetes  Outcome: Progressing     Problem: Patient/Family Goals  Goal: Patient/Family Long Term Goal  Description: Patient's Long Term Goal: To discharge from the hospital safely    Interventions:  - surgical interventions, rounding, medications  - See additional Care Plan goals for specific interventions  Outcome: Progressing  Goal: Patient/Family Short Term Goal  Description: Patient's Short Term Goal: to breathe better    Interventions:   - manage the ventilator for pt until she is able to breath on her own.  - See additional Care Plan goals for specific interventions  Outcome: Progressing     Problem: CARDIOVASCULAR  - ADULT  Goal: Maintains optimal cardiac output and hemodynamic stability  Description: INTERVENTIONS:  - Monitor vital signs, rhythm, and trends  - Monitor for bleeding, hypotension and signs of decreased cardiac output  - Evaluate effectiveness of vasoactive medications to optimize hemodynamic stability  - Monitor arterial and/or venous puncture sites for bleeding and/or hematoma  - Assess quality of pulses, skin color and temperature  - Assess for signs of decreased coronary artery perfusion - ex. Angina  - Evaluate fluid balance, assess for edema, trend weights  Outcome: Progressing  Goal: Absence of cardiac arrhythmias or at baseline  Description: INTERVENTIONS:  - Continuous cardiac monitoring, monitor vital signs, obtain 12 lead EKG if indicated  - Evaluate effectiveness of antiarrhythmic and heart rate control medications as ordered  - Initiate emergency measures for life threatening arrhythmias  - Monitor electrolytes and administer replacement therapy as ordered  Outcome: Progressing     Problem: RESPIRATORY - ADULT  Goal: Achieves optimal ventilation and oxygenation  Description: INTERVENTIONS:  - Assess for changes in respiratory status  - Assess for changes in mentation and behavior  - Position to facilitate oxygenation and minimize respiratory effort  - Oxygen supplementation based on oxygen saturation or ABGs  - Provide Smoking Cessation handout, if applicable  - Encourage broncho-pulmonary hygiene including cough, deep breathe, Incentive Spirometry  - Assess the need for suctioning and perform as needed  - Assess and instruct to report SOB or any respiratory difficulty  - Respiratory Therapy support as indicated  - Manage/alleviate anxiety  - Monitor for signs/symptoms of CO2 retention  Outcome: Progressing     Problem: GASTROINTESTINAL - ADULT  Goal: Minimal or absence of nausea and vomiting  Description: INTERVENTIONS:  - Maintain adequate hydration with IV or PO as ordered and tolerated  -  Nasogastric tube to low intermittent suction as ordered  - Evaluate effectiveness of ordered antiemetic medications  - Provide nonpharmacologic comfort measures as appropriate  - Advance diet as tolerated, if ordered  - Obtain nutritional consult as needed  - Evaluate fluid balance  Outcome: Progressing  Goal: Maintains or returns to baseline bowel function  Description: INTERVENTIONS:  - Assess bowel function  - Maintain adequate hydration with IV or PO as ordered and tolerated  - Evaluate effectiveness of GI medications  - Encourage mobilization and activity  - Obtain nutritional consult as needed  - Establish a toileting routine/schedule  - Consider collaborating with pharmacy to review patient's medication profile  Outcome: Progressing     Problem: METABOLIC/FLUID AND ELECTROLYTES - ADULT  Goal: Glucose maintained within prescribed range  Description: INTERVENTIONS:  - Monitor Blood Glucose as ordered  - Assess for signs and symptoms of hyperglycemia and hypoglycemia  - Administer ordered medications to maintain glucose within target range  - Assess barriers to adequate nutritional intake and initiate nutrition consult as needed  - Instruct patient on self management of diabetes  Outcome: Progressing  Goal: Electrolytes maintained within normal limits  Description: INTERVENTIONS:  - Monitor labs and rhythm and assess patient for signs and symptoms of electrolyte imbalances  - Administer electrolyte replacement as ordered  - Monitor response to electrolyte replacements, including rhythm and repeat lab results as appropriate  - Fluid restriction as ordered  - Instruct patient on fluid and nutrition restrictions as appropriate  Outcome: Progressing  Goal: Hemodynamic stability and optimal renal function maintained  Description: INTERVENTIONS:  - Monitor labs and assess for signs and symptoms of volume excess or deficit  - Monitor intake, output and patient weight  - Monitor urine specific gravity, serum osmolarity  and serum sodium as indicated or ordered  - Monitor response to interventions for patient's volume status, including labs, urine output, blood pressure (other measures as available)  - Encourage oral intake as appropriate  - Instruct patient on fluid and nutrition restrictions as appropriate  Outcome: Progressing     Problem: SKIN/TISSUE INTEGRITY - ADULT  Goal: Skin integrity remains intact  Description: INTERVENTIONS  - Assess and document risk factors for pressure ulcer development  - Assess and document skin integrity  - Monitor for areas of redness and/or skin breakdown  - Initiate interventions, skin care algorithm/standards of care as needed  Outcome: Progressing  Goal: Incision(s), wounds(s) or drain site(s) healing without S/S of infection  Description: INTERVENTIONS:  - Assess and document risk factors for pressure ulcer development  - Assess and document skin integrity  - Assess and document dressing/incision, wound bed, drain sites and surrounding tissue  - Implement wound care per orders  - Initiate isolation precautions as appropriate  - Initiate Pressure Ulcer prevention bundle as indicated  Outcome: Progressing  Goal: Oral mucous membranes remain intact  Description: INTERVENTIONS  - Assess oral mucosa and hygiene practices  - Implement preventative oral hygiene regimen  - Implement oral medicated treatments as ordered  Outcome: Progressing     Problem: HEMATOLOGIC - ADULT  Goal: Maintains hematologic stability  Description: INTERVENTIONS  - Assess for signs and symptoms of bleeding or hemorrhage  - Monitor labs and vital signs for trends  - Administer supportive blood products/factors, fluids and medications as ordered and appropriate  - Administer supportive blood products/factors as ordered and appropriate  Outcome: Progressing     Problem: MUSCULOSKELETAL - ADULT  Goal: Return mobility to safest level of function  Description: INTERVENTIONS:  - Assess patient stability and activity tolerance for  standing, transferring and ambulating w/ or w/o assistive devices  - Assist with transfers and ambulation using safe patient handling equipment as needed  - Ensure adequate protection for wounds/incisions during mobilization  - Obtain PT/OT consults as needed  - Advance activity as appropriate  - Communicate ordered activity level and limitations with patient/family  Outcome: Progressing  Goal: Maintain proper alignment of affected body part  Description: INTERVENTIONS:  - Support and protect limb and body alignment per provider's orders  - Instruct and reinforce with patient and family use of appropriate assistive device and precautions (e.g. spinal or hip dislocation precautions)  Outcome: Progressing     Problem: NEUROLOGICAL - ADULT  Goal: Achieves stable or improved neurological status  Description: INTERVENTIONS  - Assess for and report changes in neurological status  - Initiate measures to prevent increased intracranial pressure  - Maintain blood pressure and fluid volume within ordered parameters to optimize cerebral perfusion and minimize risk of hemorrhage  - Monitor temperature, glucose, and sodium. Initiate appropriate interventions as ordered  Outcome: Progressing     Problem: PAIN - ADULT  Goal: Verbalizes/displays adequate comfort level or patient's stated pain goal  Description: INTERVENTIONS:  - Encourage pt to monitor pain and request assistance  - Assess pain using appropriate pain scale  - Administer analgesics based on type and severity of pain and evaluate response  - Implement non-pharmacological measures as appropriate and evaluate response  - Consider cultural and social influences on pain and pain management  - Manage/alleviate anxiety  - Utilize distraction and/or relaxation techniques  - Monitor for opioid side effects  - Notify MD/LIP if interventions unsuccessful or patient reports new pain  - Anticipate increased pain with activity and pre-medicate as appropriate  Outcome:  Progressing     Problem: Delirium  Goal: Minimize duration of delirium  Description: Interventions:  - Encourage use of hearing aids, eye glasses  - Promote highest level of mobility daily  - Provide frequent reorientation  - Promote wakefulness i.e. lights on, blinds open  - Promote sleep, encourage patient's normal rest cycle i.e. lights off, TV off, minimize noise and interruptions  - Encourage family to assist in orientation and promotion of home routines  Outcome: Progressing

## 2024-12-23 NOTE — PROGRESS NOTES
Patient is a 59 year old -American, single female with past medical history of CKD, hypertension, high cholesterol who was admitted to the hospital for aortic dissection. The patient has been demonstrating alternation in mood and cognition with episodes of increased restlessness and agitation. According to team, they are trying to wean patient of propofol and precedex. Patient indicated for psych consult for evaluation and advise.  Consult Duration     The patient seen for over 35-minute, follow-up evaluation, over 50% counseling and coordinating care addressing agitation, restlessness, medication management..    Record reviewed, communication with attending, communication with RN and patient seen face to face evaluation.  History of Present Illness:     According to the team the patient has improved and she is supposed to be discharged today to [facility].    The patient continued demonstrating anxiety and depression due to her condition and inability to talk.    The patient seen today in the presence of her sister.  The patient presented calm and cooperative.  Patient agreeable that she is anxious and overwhelmed with her condition.  Patient admitting difficulty with her racing thoughts and rumination and worry.  Patient admits to being sad due to her condition.    Patient denying any homicidal or suicidal ideation.  Patient denied any auditory visual hallucination and did not appear response to internal stimuli.    The patient started on Remeron and continue taking lorazepam nightly.    Validating the patient for her anxiety and the extensive medical condition.          Past Medical History  Past Medical History:    Chronic kidney disease (CKD)    Esophageal reflux    High blood pressure    High cholesterol    HYPERTENSION    Hypertension    Unspecified essential hypertension       Past Surgical History  Past Surgical History:   Procedure Laterality Date    Colonoscopy N/A 6/2/2018    Procedure: COLONOSCOPY;   Surgeon: Magdy Webber MD;  Location: [facility] ENDOSCOPY    Endometrial ablation      child passed away for 5 mins          1    Other surgical history  11    cysto-Dr. Lacey -- pt denies       Family History  Family History   Problem Relation Age of Onset    Hypertension Mother     Heart Disorder Mother 70    Other (Other) Mother         kidney failure    Hypertension Father     Hypertension Maternal Grandfather     Cancer Neg     Diabetes Neg     Glaucoma Neg     Macular degeneration Neg        Social History  Social History     Socioeconomic History    Marital status: Single   Tobacco Use    Smoking status: Former     Current packs/day: 0.00     Average packs/day: 1 pack/day for 13.0 years (13.0 ttl pk-yrs)     Types: Cigarettes     Start date:      Quit date:      Years since quittin.9    Smokeless tobacco: Former   Vaping Use    Vaping status: Never Used   Substance and Sexual Activity    Alcohol use: Yes     Alcohol/week: 1.0 - 2.0 standard drink of alcohol     Types: 1 - 2 Cans of beer per week     Comment: every day    Drug use: No     Social Drivers of Health     Food Insecurity: Unknown (2024)    Food Insecurity     Food Insecurity: Patient unable to answer   Transportation Needs: Unknown (2024)    Transportation Needs     Lack of Transportation: Patient unable to answer   Housing Stability: Unknown (2024)    Housing Stability     Housing Instability: Patient unable to answer           Current Medications:  Current Facility-Administered Medications   Medication Dose Route Frequency    LORazepam (Ativan) tab 0.5 mg  0.5 mg Oral TID    metoclopramide (Reglan) 5 mg/mL injection 5 mg  5 mg Intravenous Q6H    collagenase (Santyl) 250 UNIT/GM ointment   Topical Daily    lidocaine-menthol 4-1 % patch 1 patch  1 patch Transdermal Daily    hydrALAzine (Apresoline) 20 mg/mL injection 10 mg  10 mg Intravenous Q2H PRN    ondansetron (Zofran) 4 MG/2ML injection 4 mg  4  mg Intravenous Q6H PRN    pantoprazole (Protonix) 40 mg in sodium chloride 0.9% PF 10 mL IV push  40 mg Intravenous Q12H    mirtazapine (Remeron) tab 7.5 mg  7.5 mg Oral Nightly    furosemide (Lasix) 10 mg/mL injection 40 mg  40 mg Intravenous BID (Diuretic)    dextrose 10% infusion (TPN no rate)   Intravenous Continuous PRN    pancrelipase (Lip-Prot-Amyl) (Zenpep) DR particles cap 10,000 Units  10,000 Units Per G Tube PRN    And    sodium bicarbonate tab 325 mg  325 mg Oral PRN    LORazepam (Ativan) 2 mg/mL injection 1 mg  1 mg Intravenous Q4H PRN    dextrose 10% infusion (TPN no rate)   Intravenous Continuous PRN    fentaNYL (Sublimaze) 50 mcg/mL injection 25 mcg  25 mcg Intravenous Q2H PRN    fentaNYL (Sublimaze) 50 mcg/mL injection 50 mcg  50 mcg Intravenous Q2H PRN    cloNIDine (Catapres) 0.3 MG/24HR patch 1 patch  1 patch Transdermal Weekly    sodium chloride 3 % nebulizer solution 3 mL  3 mL Nebulization PRN    albuterol (Ventolin) (2.5 MG/3ML) 0.083% nebulizer solution 2.5 mg  2.5 mg Nebulization Q6H PRN    carvedilol (Coreg) tab 37.5 mg  37.5 mg Oral BID with meals    acetaminophen (Tylenol) 160 MG/5ML oral liquid 650 mg  650 mg Oral Q6H PRN    aspirin chewable tab 81 mg  81 mg Peg Tube Daily    isosorbide dinitrate (Isordil) tab 40 mg  40 mg Per NG Tube TID (Nitrates)    hydrALAZINE (Apresoline) tab 150 mg  150 mg Oral Q8H GABRIEL    amLODIPine (Norvasc) tab 10 mg  10 mg Oral Daily    ipratropium-albuterol (Duoneb) 0.5-2.5 (3) MG/3ML inhalation solution 3 mL  3 mL Nebulization Q6H PRN    HYDROcodone-acetaminophen (Norco) 5-325 MG per tab 2 tablet  2 tablet Oral Q4H PRN    heparin (Porcine) 5000 UNIT/ML injection 5,000 Units  5,000 Units Subcutaneous Q8H GABRIEL    melatonin tab 3 mg  3 mg Oral Nightly PRN    potassium chloride 20 mEq/100mL IVPB premix 20 mEq  20 mEq Intravenous PRN    Or    potassium chloride 40 mEq/100mL IVPB premix (central line) 40 mEq  40 mEq Intravenous PRN    magnesium sulfate in dextrose 5%  1 g/100mL infusion premix 1 g  1 g Intravenous PRN    magnesium sulfate in sterile water for injection 2 g/50mL IVPB premix 2 g  2 g Intravenous PRN    chlorhexidine gluconate (Peridex) 0.12 % oral solution 15 mL  15 mL Mouth/Throat BID     Medications Prior to Admission   Medication Sig    Acetaminophen ER (TYLENOL 8 HOUR) 650 MG Oral Tab CR Take 2 tablets (1,300 mg total) by mouth daily as needed.    Calcium Carb-Cholecalciferol 600-10 MG-MCG Oral Tab Take 1 tablet by mouth daily.    metoprolol succinate ER 50 MG Oral Tablet 24 Hr Take 1 tablet (50 mg total) by mouth daily.    Losartan Potassium-HCTZ 100-12.5 MG Oral Tab Take 1 tablet by mouth daily.    Potassium Chloride ER 20 MEQ Oral Tab CR Take 1 tablet by mouth daily.    albuterol 108 (90 Base) MCG/ACT Inhalation Aero Soln Inhale 2 puffs into the lungs every 4 (four) hours as needed for Wheezing.    amLODIPine 10 MG Oral Tab Take 1 tablet (10 mg total) by mouth daily.    Ascorbic Acid (VITAMIN C) 1000 MG Oral Tab Take 1 tablet (1,000 mg total) by mouth daily.    vitamin B-12 50 MCG Oral Tab Take 1 tablet (50 mcg total) by mouth daily.    fluticasone propionate 50 MCG/ACT Nasal Suspension 2 sprays by Nasal route daily.    fluticasone-salmeterol 250-50 MCG/ACT Inhalation Aerosol Powder, Breath Activated Inhale 1 puff into the lungs 2 (two) times daily. inhale 1 puff by INHALATION route 2 times every day morning and evening approximately 12 hours apart (Patient not taking: Reported on 11/21/2024)       Allergies  Allergies[1]    Review of Systems:   As by Admitting/Attending    Results:   Laboratory Data:  Lab Results   Component Value Date    WBC 9.9 12/20/2024    HGB 8.2 (L) 12/20/2024    HCT 25.2 (L) 12/20/2024    .0 12/20/2024    CREATSERUM 1.50 (H) 12/22/2024    BUN 35 (H) 12/22/2024     12/22/2024    K 3.9 12/22/2024     12/22/2024    CO2 28.0 12/22/2024     (H) 12/22/2024    CA 9.3 12/22/2024    ALB 3.2 12/15/2024    JORGE LUIS Mane  12/15/2024    TP 5.7 12/15/2024    AST 25 12/15/2024    ALT 23 12/15/2024    PTT 30.7 12/04/2024    INR 1.17 12/04/2024    PTP 15.6 (H) 12/04/2024    TSH 2.085 12/07/2024    NATHALIE 99 11/25/2024    LIP 28 11/25/2024    DDIMER 0.42 12/08/2019    ESRML 15 06/05/2021    MG 2.1 12/22/2024    PHOS 3.2 12/22/2024    TROP <0.045 12/08/2019     (H) 09/23/2021    B12 1,156 (H) 07/23/2018         Imaging:  No results found.    Vital Signs:   Blood pressure 122/70, pulse 85, temperature 98.6 °F (37 °C), temperature source Oral, resp. rate 16, height 65\", weight 132.8 kg (292 lb 12.3 oz), SpO2 97%, not currently breastfeeding.    Mental Status Exam:   Appearance: Stated age female, in hospital gown, laying down in hospital bed.  Patient with tracheostomy.  Psychomotor: Slow psychomotor function.  Orientation: The patient is alert and oriented to self, place, date and condition.  Gait: Not evaluated.  Attitude/Coorperation: Patient presents cooperative and attentive.  Behavior: Appropriate behavior  Speech: Soft low-tone speech.  Patient noticeably less clear with her speech today.  Mood: Patient admitted feeling sad and nervous.  Affect: Anxious affect congruent with the mood.  Thought process: Improvement in her thought process otherwise limited verbal communication.  Thought content: No homicidal or suicidal ideation  Perceptions: Patient denied any auditory or visual hallucination.  Concentration: Grossly impaired  Memory: Grossly impaired  Intellect: Average.  Judgment and Insight: Appropriate    Impression:   Delirium due to another medical condition.  Episodic mood disorder  Agitation    Dissection of ascending aorta (HCC)    NAIMA (acute kidney injury) (HCC)    Stage 3 chronic kidney disease (HCC)    Acute respiratory failure with hypoxia (HCC)    Hypervolemia    Ileus (HCC)      Patient is a 59 year old -American, single female with past medical history of CKD, hypertension, high cholesterol who was admitted to  the hospital for aortic dissection. The patient has been demonstrating alternation in mood and cognition with episodes of increased restlessness and agitation. According to team, they are trying to wean patient of propofol and precedex.     12/13/2024: patient remains on precedex. Continues to have episodes of increased restlessness and agitation. Haldol given this morning.     12/14/2024: The patient has been suffering from severe anxiety, confusion, challenging medical condition and delirious episode caused more paranoia and agitation.  Presents is reasonable approach but stabilizing the mood with antipsychotic medication in the appropriate approach at this point.    12/15/2024: The patient has been demonstrating improvement to the addition of Haldol with decrease in the Precedex.    12/16/2024: The patient continue demonstrating slight improvement with no agitation, no hallucination but some anxiety and slight confusion about her medical condition.  Precedex is 0.4 can be lowered gradually.    12/17/2024: The patient has been demonstrating improvement to her delirious episode and has not been on Precedex, Haldol and did not indicate any medication as needed or fentanyl.  Continue current management and sign off.    12/18/2024: The patient who was in delirious state has been demonstrating improvement in her cognition and orientation and understanding to her medical condition.  Patient has been sinking into a depressed and anxious state with some hopelessness and helplessness.    12/19/2024: The patient received Lexapro yesterday but the patient continued demonstrating GI difficulty and it is more appropriate to switch Lexapro to mirtazapine.    12/23/2024: The patient continued demonstrate some symptom of anxiety and depression and indicated continuation of treatment.  Otherwise no suicidality or agitation and appropriate to transfer to [facility].    Discussed risk and benefit, acknowledging the current symptom and  severity.  At this point, I would recommend the following approach:     Focus on safety  Focus on education and support.  Focus on insight orientation helping the patient understand diagnosis and treatment plan.  Switch Ativan to 0.5 mg tablet 3 times daily.  Continue mirtazapine 7.5 mg via feeding tube  Appropriate transfer to skilled nursing facility.    Processed with patient at length, the initiation of the above psychotropic medications I advised the patient of the risks, benefits, alternatives and potential side effects. The patient consents to administration of the medications and understands the right to refuse medications at any time. The patient verbalized understanding.   Coordinate plan with team    Orders This Visit:  Orders Placed This Encounter   Procedures    Troponin I (High Sensitivity)    CBC With Differential With Platelet    Basic Metabolic Panel (8)    Hepatic Function Panel (7)    PTT, Activated    Prothrombin Time (PT)    Pro Beta Natriuretic Peptide    Open heart surgical profile    CBC, Platelet; No Differential    CBC With Differential With Platelet    Prothrombin Time (PT)    PTT, Activated    Fibrinogen Activity    Expanded Arterial Blood Gas    Expanded Arterial Blood Gas    Arterial blood gas    CBC, Platelet; No Differential    Basic Metabolic Panel (8)    Magnesium    Comp Metabolic Panel (14)    Magnesium    Expanded Arterial Blood Gas    Arterial blood gas    CBC, Platelet; No Differential    Magnesium    Renal Function Panel    Urinalysis, Routine    Microalb/Creat Ratio, Random Urine    Comp Metabolic Panel (14)    Magnesium    CBC With Differential With Platelet    Phosphorus    CBC, Platelet; No Differential    Heparin Induced Platelet    Expanded Arterial Blood Gas    Magnesium    Renal Function Panel    Lipase    Amylase    Lactic Acid, Plasma    Basic Metabolic Panel (8)    Potassium    Lipid Panel    Basic Metabolic Panel (8)    CBC, Platelet; No Differential    CBC,  Platelet; No Differential    Magnesium    Renal Function Panel    CBC, Platelet; No Differential    Triglycerides    Magnesium    CBC With Differential With Platelet    Renal Function Panel    Manual differential    CBC With Differential With Platelet    Procalcitonin    Manual differential    CBC With Differential With Platelet    Renal Function Panel    Magnesium    Manual differential    REDRAW RENAL    Magnesium    CBC With Differential With Platelet    Comp Metabolic Panel (14)    Iron And Tibc    Phosphorus    Manual differential    Expanded Arterial Blood Gas    Magnesium    CBC With Differential With Platelet    Renal Function Panel    PTT, Activated    Prothrombin Time (PT)    Manual differential    CBC, Platelet; No Differential    Comp Metabolic Panel (14)    Lipid Panel    Expanded Arterial Blood Gas    Basic Metabolic Panel (8)    CBC, Platelet; No Differential    Magnesium    Renal Function Panel    CBC With Differential With Platelet    Magnesium    Renal Function Panel    CBC With Differential With Platelet    Potassium    TSH W Reflex To Free T4    Magnesium    CBC With Differential With Platelet    Renal Function Panel    Scan slide    Magnesium    Phosphorus    CBC With Differential With Platelet    Comp Metabolic Panel (14)    REDRAW CMP (P)    Basic Metabolic Panel (8)    CBC, Platelet; No Differential    Basic Metabolic Panel (8)    CBC, Platelet; No Differential    Basic Metabolic Panel (8)    REDRAW BMP    Potassium    Basic Metabolic Panel (8)    CBC With Differential With Platelet    Scan slide    Potassium    RBC Morphology Scan    Magnesium    Phosphorus    Basic Metabolic Panel (8)    Comp Metabolic Panel (14)    Magnesium    Phosphorus    Triglycerides    Triglycerides    Potassium    CBC, Platelet; No Differential    CBC, Platelet; No Differential    Potassium    Vancomycin Peak, Serum    CBC, Platelet; No Differential    Comp Metabolic Panel (14)    Hemoglobin & Hematocrit    CBC,  Platelet; No Differential    Magnesium    Phosphorus    Basic Metabolic Panel (8)    Magnesium    Phosphorus    CBC With Differential With Platelet    Manual differential    Magnesium    CBC With Differential With Platelet    Basic Metabolic Panel (8)    Phosphorus    Manual differential    Basic Metabolic Panel (8)    Magnesium    Phosphorus    CBC With Differential With Platelet    Basic Metabolic Panel (8)    Magnesium    Phosphorus    CBC With Differential With Platelet    POSITIONING CUSHION/PILLOW/WEDGE, ANY SHAPE OR SIZE PRN    Type and screen    ABORH (Blood Type)    Antibody Screen    ABORH Confirmation    Prepare platelets Once    Prepare fresh frozen plasma Once    Prepare RBC STAT    Prepare cryoprecipitate Once    Type and screen    Prepare RBC Once    ABORH (Blood Type)    Antibody Screen    Type and screen    Prepare RBC STAT    ABORH (Blood Type)    Antibody Screen    Type and screen    Prepare RBC STAT    ABORH (Blood Type)    Antibody Screen    Specimen to Pathology Tissue    Rapid SARS-CoV-2 by PCR    MRSA Screen by PCR    Sputum culture    Sputum culture       Meds This Visit:  Requested Prescriptions     Signed Prescriptions Disp Refills    albuterol (2.5 MG/3ML) 0.083% Inhalation Nebu Soln 1 each 0     Sig: Take 3 mL (2.5 mg total) by nebulization every 6 (six) hours as needed for Wheezing or Shortness of Breath.    aspirin 81 MG Oral Chew Tab 30 tablet 0     Si tablet (81 mg total) by Peg Tube route daily.    carvedilol 12.5 MG Oral Tab 60 tablet 0     Sig: 3 tablets (37.5 mg total) by Per G Tube route 2 (two) times daily with meals.    cloNIDine 0.3 MG/24HR Transdermal Patch Weekly 4 patch 0     Sig: Place 1 patch onto the skin once a week.    hydrALAZINE 50 MG Oral Tab 90 tablet 0     Sig: 3 tablets (150 mg total) by Per G Tube route every 8 (eight) hours.    isosorbide dinitrate 40 MG Oral Tab 90 tablet 0     Si tablet (40 mg total) by Per NG Tube route TID (Nitrates).     mirtazapine 7.5 MG Oral Tab 30 tablet 0     Si tablet (7.5 mg total) by Per G Tube route nightly.    LORazepam 0.5 MG Oral Tab 90 tablet 1     Sig: Take 1 tablet (0.5 mg total) by mouth in the morning, at noon, and at bedtime.    Naloxone HCl 4 MG/0.1ML Nasal Liquid 1 kit 0     Si mg by Intranasal route as needed (May repeat as needed in other nostril if symptoms persist). If patient remains unresponsive, repeat dose in other nostril 2-5 minutes after first dose.         Armen Dudley MD  2024    Note to Patient: The  [employer] C[employer] Act makes medical notes like these available to patients in the interest of transparency. However, be advised this is a medical document. It is intended as peer to peer communication. It is written in medical language and may contain abbreviations or verbiage that are unfamiliar. It may appear blunt or direct. Medical documents are intended to carry relevant information, facts as evident, and the clinical opinion of the practitioner. This note may have been transcribed using a voice dictation system. Voice recognition errors may occur. This should not be taken to alter the content or meaning of this note.           [1]   Allergies  Allergen Reactions    Atorvastatin MYALGIA    Pravastatin MYALGIA    Rosuvastatin MYALGIA    Seasonal Runny nose

## 2024-12-23 NOTE — CM/SW NOTE
Critical care ambulance set up to take to [place] at 1230.  Julienne, patient's sister is aware and is on her way up.  Report can be called to [phone].  Will be going into bed 311.    Zohra GONZALEZA BSN RN CRRN   RN Case Manager  [phone]

## 2024-12-23 NOTE — PROGRESS NOTES
[facility]  part of [facility]    Cardiology Progress Note    Alisha Wilde Patient Status:  Inpatient    10/25/1965 MRN [id number]   Location [facility] 2W/SW Attending Aguila Villegas MD   Hosp Day # 31 PCP Bridger Avendano MD     Interval History   Awake and alert  No chest pain  BP well controlled    Assessment and Plan:   Acute hypoxic respiratory failure  Multiple aspiration events, emesis  Aspiration PNA  Suspected ileus  Type A aortic dissection  NAIMA on CKD-III, improved  Obesity  EtOH abuse  -presented with acute onset CP   -CT with type A aortic dissection above right coronary artery and extending distally into the left common iliac artery and external iliac   -s/p emergent successful repair on 24  -course complicated by failure to extubated resulting in Trach/PEG tube placement and multiple aspiration events associated with ileus    -CXR on  with likely aspiration PNA   -on scheduled Reglan per GI    Hypertension  -TTE on  with hyperdynamic LVEF  -resumed oral antihypertensive therapies  -continue coreg 37.5 mg BID  -continue combination hydralazine + isosorbide 150mg - 40mg TID  -continue amlodipine 10 mg daily  -continue clonidine 0.3 mg/hr patch  -holding RAAS inhibition due to NAIMA, appreciate nephrology assistance    Volume overload  HFpEF  -improved volume status, remains overloaded  -continue IV furosemide 40mg BID -> appreciate nephrology assistance, recommend transitioning to PO 40mg BID upon discharge    Objective:   Patient Vitals for the past 24 hrs:   BP Temp Temp src Pulse Resp SpO2 Weight   24 1500 119/66 -- -- 70 18 92 % --   24 1400 124/66 -- -- 71 18 95 % --   24 1300 122/66 -- -- 68 18 97 % --   24 1200 124/68 98.4 °F (36.9 °C) Axillary 68 18 97 % --   24 1100 127/75 -- -- 69 18 98 % --   24 1000 129/71 -- -- 69 18 97 % --   24 0900 128/69 -- -- 70 18 97 % --   24 0800 (!) 143/118 97.7 °F  (36.5 °C) Temporal 70 18 98 % --   11/26/24 0700 130/70 -- -- 69 18 99 % --   11/26/24 0621 -- -- -- -- -- -- 282 lb 3 oz (128 kg)   11/26/24 0600 95/53 -- -- 68 21 97 % --   11/26/24 0500 121/67 -- -- 70 18 97 % --   11/26/24 0400 119/68 98.4 °F (36.9 °C) Temporal 72 18 98 % --   11/26/24 0200 109/60 -- -- 72 21 96 % --   11/26/24 0130 -- -- -- 72 18 97 % --   11/26/24 0100 142/73 -- -- 71 18 98 % --   11/26/24 0030 -- -- -- 71 18 99 % --   11/26/24 0000 139/70 98.3 °F (36.8 °C) Temporal 70 18 98 % --   11/25/24 2300 130/69 -- -- 72 18 98 % --   11/25/24 2200 135/70 -- -- 71 18 98 % --   11/25/24 2100 138/71 -- -- 71 18 99 % --   11/25/24 2000 132/68 98.1 °F (36.7 °C) Temporal 69 18 99 % --   11/25/24 1900 124/64 -- -- 71 18 98 % --   11/25/24 1800 136/72 -- -- 71 18 98 % --   11/25/24 1700 138/70 -- -- 70 18 98 % --       Intake/Output:   Last 3 shifts:   Intake/Output                   11/24/24 0700 - 11/25/24 0659 11/25/24 0700 - 11/26/24 0659 11/26/24 0700 - 11/27/24 0659       Intake    P.O.  0  0  --    P.O. 0 0 --    I.V.  2276.7  1884.9  --    I.V. 154 615 --    Volume (mL) Insulin 53.9 37 --    Volume (mL) Nitroglycerin 236.9 54.5 --    Volume (mL) Dobutamine 4.5 -- --    Volume (mL) Propofol 477.8 713.9 --    Volume (mL) Dexmedetomidine 352.2 384.5 --    Volume (mL) (dextrose 5%-sodium chloride 0.45% infusion) 997.4 80 --    NG/GT  50  150  60    Intake (mL) (NG/OG Tube Orogastric 16 Fr. Right mouth) 50 150 60    IV PIGGYBACK  600  500  --    Volume (mL) (piperacillin-tazobactam (Zosyn) 3.375 g in dextrose 5% 100 mL IVPB-ADDV) 300 200 --    Volume (mL) (acetaminophen (Ofirmev) 10 mg/mL infusion premix 1,000 mg) 200 100 --    Volume (mL) (potassium chloride 20 mEq/100mL IVPB premix 20 mEq) -- 100 --    Volume (mL) (potassium chloride 40 mEq/100mL IVPB premix (central line) 40 mEq) 100 100 --    Total Intake 2926.7 2534.9 60       Output    Urine  1800  3570  800    Output (mL) (Urethral Catheter  Latex;Temperature probe;Double-lumen) 1800 3570 800    Emesis/NG output  550  365  --    Residual volume (ml) (NG/OG Tube Orogastric 16 Fr. Right mouth) -- 5 --    Output (mL) (NG/OG Tube Orogastric 16 Fr. Right mouth) 550 360 --    Chest Tube  188  125  30    Output (mL) ([REMOVED] Chest Tube Anterior Mediastinal 32 Fr.) 48 50 --    Output (mL) (Chest Tube Anterior Pericardial 24 Fr.) 140 75 30    Total Output 2538 4060 830       Net I/O     388.7 -1525.1 -770             Vent Settings: Vent Mode: PRVC/AC  FiO2 (%):  [30 %-40 %] 40 %  S RR:  [18] 18  S VT:  [550 mL] 550 mL  PEEP/CPAP (cm H2O):  [5 cm H20] 5 cm H20  MAP (cm H2O):  [11-12] 11    Hemodynamic parameters (last 24 hours):      Scheduled Meds:    LORazepam  0.5 mg Oral TID    metoclopramide  5 mg Intravenous Q6H    collagenase   Topical Daily    lidocaine-menthol  1 patch Transdermal Daily    pantoprazole  40 mg Intravenous Q12H    mirtazapine  7.5 mg Oral Nightly    furosemide  40 mg Intravenous BID (Diuretic)    cloNIDine  1 patch Transdermal Weekly    carvedilol  37.5 mg Oral BID with meals    aspirin  81 mg Peg Tube Daily    isosorbide dinitrate  40 mg Per NG Tube TID (Nitrates)    hydrALAZINE  150 mg Oral Q8H GABRIEL    amLODIPine  10 mg Oral Daily    heparin  5,000 Units Subcutaneous Q8H GABRIEL    chlorhexidine gluconate  15 mL Mouth/Throat BID       Continuous Infusions:    dextrose 10%      dextrose 10%         Results:     Lab Results   Component Value Date    WBC 9.9 12/20/2024    HGB 8.2 (L) 12/20/2024    HCT 25.2 (L) 12/20/2024    .0 12/20/2024    CREATSERUM 1.50 (H) 12/22/2024    BUN 35 (H) 12/22/2024     12/22/2024    K 3.9 12/22/2024     12/22/2024    CO2 28.0 12/22/2024     (H) 12/22/2024    CA 9.3 12/22/2024    ALB 3.2 12/15/2024    ALKPHO 68 12/15/2024    BILT 0.6 12/15/2024    TP 5.7 12/15/2024    AST 25 12/15/2024    ALT 23 12/15/2024    PTT 30.7 12/04/2024    INR 1.17 12/04/2024    TSH 2.085 12/07/2024    NATHALIE 99  11/25/2024    LIP 28 11/25/2024    DDIMER 0.42 12/08/2019    ESRML 15 06/05/2021    MG 2.1 12/22/2024    PHOS 3.2 12/22/2024    TROP <0.045 12/08/2019     (H) 09/23/2021    B12 1,156 (H) 07/23/2018       Recent Labs   Lab 12/20/24  0626 12/21/24  0550 12/22/24  0525   * 136* 130*   BUN 27* 32* 35*   CREATSERUM 1.48* 1.52* 1.50*   CA 9.3 9.3 9.3    140 138   K 4.2 4.3 3.9    105 103   CO2 26.0 27.0 28.0     Recent Labs   Lab 12/18/24  0409 12/19/24  0358 12/20/24  0626   RBC 3.10* 3.00* 2.96*   HGB 8.1* 8.0* 8.2*   HCT 25.5* 25.2* 25.2*   MCV 82.3 84.0 85.1   MCH 26.1 26.7 27.7   MCHC 31.8 31.7 32.5   RDW 19.2* 19.1* 19.6*   NEPRELIM 6.96 7.58 7.66   WBC 10.5 10.4 9.9   .0 203.0 203.0       No results for input(s): \"BNPML\" in the last 168 hours.    No results for input(s): \"TROP\", \"CK\" in the last 168 hours.    No results found.             Exam:     Physical Exam:  General: awake/alert  HEENT: +trach  Neck: No JVD, carotids 2+, no bruits.  Cardiac: Regular rate and rhythm. S1, S2 normal.   Lungs: +rhonchi   Abdomen: Soft, non-tender.BS-present.  Extremities: Without clubbing or cyanosis. + BLE edema.    Neurologic: unable to obtain  Skin: Warm and dry.     Edward Montgomery, [facility] [place]

## 2024-12-23 NOTE — CM/SW NOTE
12/23/24 1100   Discharge disposition   Expected discharge disposition [facility]   Post Acute Care Provider [facility]   Discharge transportation [facility]     Transport set up for 1230.    Zohra GONZALEZA BSN RN CRRN   RN Case Manager  [phone]

## 2024-12-23 NOTE — RESPIRATORY THERAPY NOTE
Patient received trached, portex 8, and on ventilator PRVC 18/550/+5/30%. Bilateral breath sounds auscultated. Suction provided when indicated. Trach care provided. No acute events during the night. RT will continue to monitor.       12/23/24 0545   Readings   Total RR 20   Minute Ventilation (L/min) 10.5 L/min   Expiratory Tidal Volume 530 mL   PIP Observed (cm H2O) 33 cm H2O   MAP (cm H2O) 11

## 2024-12-23 NOTE — PLAN OF CARE
Problem: RESPIRATORY - ADULT  Goal: Achieves optimal ventilation and oxygenation  Description: INTERVENTIONS:  - Assess for changes in respiratory status  - Assess for changes in mentation and behavior  - Position to facilitate oxygenation and minimize respiratory effort  - Oxygen supplementation based on oxygen saturation or ABGs  - Provide Smoking Cessation handout, if applicable  - Encourage broncho-pulmonary hygiene including cough, deep breathe, Incentive Spirometry  - Assess the need for suctioning and perform as needed  - Assess and instruct to report SOB or any respiratory difficulty  - Respiratory Therapy support as indicated  - Manage/alleviate anxiety  - Monitor for signs/symptoms of CO2 retention  Outcome: Progressing   Received trach pt on full vent support. Pt later placed on SBT 5/5, 30%, pt tolerated well w/ VSS for 30 mins. Pt then placed on T-Piece 10L, 40%, pt tolerated well. Pt placed back on ventilator after 3hrs & 20mins d/t pt reported dyspnea. Trach care performed, trach ties remain secure. RT to continue to monitor.

## 2024-12-23 NOTE — PROGRESS NOTES
Progress Note     Alisah Wilde Patient Status:  Inpatient    10/25/1965 MRN [id number]   Location [facility] 2W/SW Attending Stefani Hsu MD   Hosp Day # 30 PCP Bridger Avendano MD     Subjective:   S: Patient seen and examined this morning. Flexiseal in place for loose stools.   TFs advanced to 45, tolerating with no N/V.   Remains on vent via trach.     Review of Systems:   10 point ROS completed and was negative, except for pertinent positive and negatives stated in subjective.    Objective:   Vital signs:  Temp:  [97.2 °F (36.2 °C)-98.4 °F (36.9 °C)] 98.2 °F (36.8 °C)  Pulse:  [57-87] 66  Resp:  [15-23] 18  BP: (110-154)/() 111/62  SpO2:  [95 %-99 %] 96 %  FiO2 (%):  [30 %-40 %] 30 %    Wt Readings from Last 6 Encounters:   24 290 lb 4.8 oz (131.7 kg)   10/24/24 254 lb (115.2 kg)   24 249 lb (112.9 kg)   24 249 lb (112.9 kg)   23 249 lb (112.9 kg)   10/09/23 248 lb (112.5 kg)         Physical Exam:    General: AAF, NAD Alert , Tracheostomy in place,     , morbidly obese  HEENT: trach in place   Respiratory: Clear to auscultation bilaterally. No wheezes. No rhonchi.  Cardiovascular: S1, S2. Regular rate and rhythm. No murmurs, rubs or gallops.   Abdomen: Distended, hypoactive BS; PEG in place   : left groin wound (refer wound care notes)  Neurologic: No focal neurological deficits.   Musculoskeletal: Moves all extremities.  Extremities: No edema.    Results:   Diagnostic Data:      Labs:    Labs Last 24 Hours:   BMP     CBC    Other     Na 138 Cl 103 BUN 35 Glu 130   Hb -   PTT - Procal -   K 3.9 CO2 28.0 Cr 1.50   WBC - >< PLT -  INR - CRP -   Renal Lytes Endo    Hct -   Trop - D dim -   eGFR - Ca 9.3 POC Gluc  127    LFT   pBNP - Lactic -   eGFR AA - PO4 3.2 A1c -   AST - APk - Prot -  LDL -     Mg 2.1 TSH -   ALT - T miranda - Alb -        COVID-19 Lab Results    COVID-19  Lab Results   Component Value Date    COVID19 Not Detected 2024    COVID19 Not Detected  08/10/2023    COVID19 Not Detected 05/13/2022       Pro-Calcitonin  No results for input(s): \"PCT\" in the last 168 hours.    Cardiac  No results for input(s): \"TROP\", \"PBNP\" in the last 168 hours.    Creatinine Kinase  No results for input(s): \"CK\" in the last 168 hours.    Inflammatory Markers  No results for input(s): \"CRP\", \"NATALIA\", \"LDH\", \"DDIMER\" in the last 168 hours.    Imaging: Imaging data reviewed in Epic.    Medications:    metoclopramide  5 mg Intravenous Q6H    collagenase   Topical Daily    lidocaine-menthol  1 patch Transdermal Daily    pantoprazole  40 mg Intravenous Q12H    mirtazapine  7.5 mg Oral Nightly    furosemide  40 mg Intravenous BID (Diuretic)    LORazepam  0.5 mg Intravenous QID    cloNIDine  1 patch Transdermal Weekly    carvedilol  37.5 mg Oral BID with meals    aspirin  81 mg Peg Tube Daily    isosorbide dinitrate  40 mg Per NG Tube TID (Nitrates)    hydrALAZINE  150 mg Oral Q8H GABRIEL    amLODIPine  10 mg Oral Daily    heparin  5,000 Units Subcutaneous Q8H GABRIEL    chlorhexidine gluconate  15 mL Mouth/Throat BID       Assessment & Plan:   ASSESSMENT / PLAN:     60 y/o Morbid obesity, hypertension, gastroesophageal reflux disease, chronic kidney disease stage 2 to 3, and hyperlipidemia admitted on 11/21 for Chest pain -CT CT angiogram of the chest, abdomen, and pelvis rule out dissection showed type A aortic dissection s/p emergent repair 11/22/24 , transferred to ICU post op intubated on 11/22, failed SBT, s/p trach 12/4,  PEG 12/5, completed abx secondary to aspiration event.       Type A aortic dissection   -s/p emergent repair   -s/p labetalol gtt and Nicardipine   -cardiothoracic with goal SBP <140  -multiple oral antiHTN meds for BP now being given  -s/p pRBC transfusion 12/14/24 for anemia. No obvious s/s bleeding  -CV surgery, cards and critical care on consult.   TTE LVEF 75-80%.     Ileus   -On IV Reglan- montior QT interval   - continue bowel regimen- miralax bid, tap water enemas-  GI on board   New picc 12/2       - Plan is to eventually discharge to LTAC  PEG 12/5-  ok for meds, starting trickle feeds, keep TPN for now but wean as able per dietitian.   Weaning antihypertensives, kub improved     Acute hypoxic respiratory failure   Aspiration event post-op  Sputum cx candida - likely contaminant   Completed 10 days of zosyn.   Failed SBT multiple times. S/p trach 12/4 and PEG 12/5   ENT, GI, and Pulm on consult.  CXR with multifocal airspace dz-completed another 7 days zosyn per pulm for recurrent aspiration   Albuterol nebs   -daily sbt per pulm, goal to transition to trach collar       H/o tobacco and ETOH abuse   Duonebs PRN   CIWA protocol     NAIMA on CKD III   Volume overload  Renal on consult.   [place] to be secondary to MARY LOU/ATN   Off lasix drip now on IV BID lasix. Stable  Doppler renal ultrasound unremarkable for renal artery stenosis     Essential HTN   Coreg 25 mg BID, norvasc 10mg daily, hydralazine 150mg TID, Clonidinie patch 0.2mg TID, isosobide 150-40 mg TID   On lasix  Webb in place   ARB on hold due to NAIMA.   Off labetalol , off cardene now     Iron def anemia  IV iron.   Iron level low   Transfuse for Hb < 7.0      Other medical problems:  Morbid Obesity   TANYA   Left groin wound - cont wound care       Quality:  DVT Prophylaxis: Heparin   CODE status: FULL   DISPO: LTAC soon-  once off TPN, tolerating feeds, and off IV bp meds.         MDM: High   I personally spent time on chart/note review, review of labs/imaging, discussion with patient, physical exam, discussion with staff, consultants, coordinating care, writing progress note, and discussion of plan of care.      Stefani Hsu MD    Supplementary Documentation:

## 2024-12-23 NOTE — PLAN OF CARE
Problem: Patient Centered Care  Goal: Patient preferences are identified and integrated in the patient's plan of care  Description: Interventions:  - What would you like us to know as we care for you? I work at [employer] and I am still very good friends with people from grade school! My brother, Osbaldo Prince, has been making decisions for me.  - Provide timely, complete, and accurate information to patient/family  - Incorporate patient and family knowledge, values, beliefs, and cultural backgrounds into the planning and delivery of care  - Encourage patient/family to participate in care and decision-making at the level they choose  - Honor patient and family perspectives and choices  12/23/2024 0659 by Christopher Mars RN  Outcome: Progressing  12/22/2024 1955 by Christopher Mars RN  Outcome: Progressing     Problem: Diabetes/Glucose Control  Goal: Glucose maintained within prescribed range  Description: INTERVENTIONS:  - Monitor Blood Glucose as ordered  - Assess for signs and symptoms of hyperglycemia and hypoglycemia  - Administer ordered medications to maintain glucose within target range  - Assess barriers to adequate nutritional intake and initiate nutrition consult as needed  - Instruct patient on self management of diabetes  12/23/2024 0659 by Christopher Mars RN  Outcome: Progressing  12/22/2024 1955 by Christopher Mars RN  Outcome: Progressing     Problem: Patient/Family Goals  Goal: Patient/Family Long Term Goal  Description: Patient's Long Term Goal: To discharge from the hospital safely    Interventions:  - surgical interventions, rounding, medications  - See additional Care Plan goals for specific interventions  12/23/2024 0659 by Christopher Mars RN  Outcome: Progressing  12/22/2024 1955 by Christopher Mars RN  Outcome: Progressing  Goal: Patient/Family Short Term Goal  Description: Patient's Short Term Goal: to breathe better    Interventions:   - manage the ventilator for pt until she is able to breath on  her own.  - See additional Care Plan goals for specific interventions  12/23/2024 0659 by Christopher Mars RN  Outcome: Progressing  12/22/2024 1955 by Christopher Mars RN  Outcome: Progressing     Problem: CARDIOVASCULAR - ADULT  Goal: Maintains optimal cardiac output and hemodynamic stability  Description: INTERVENTIONS:  - Monitor vital signs, rhythm, and trends  - Monitor for bleeding, hypotension and signs of decreased cardiac output  - Evaluate effectiveness of vasoactive medications to optimize hemodynamic stability  - Monitor arterial and/or venous puncture sites for bleeding and/or hematoma  - Assess quality of pulses, skin color and temperature  - Assess for signs of decreased coronary artery perfusion - ex. Angina  - Evaluate fluid balance, assess for edema, trend weights  12/23/2024 0659 by Christopher Mars RN  Outcome: Progressing  12/22/2024 1955 by Christopher Mars RN  Outcome: Progressing  Goal: Absence of cardiac arrhythmias or at baseline  Description: INTERVENTIONS:  - Continuous cardiac monitoring, monitor vital signs, obtain 12 lead EKG if indicated  - Evaluate effectiveness of antiarrhythmic and heart rate control medications as ordered  - Initiate emergency measures for life threatening arrhythmias  - Monitor electrolytes and administer replacement therapy as ordered  12/23/2024 0659 by Christopher Mars RN  Outcome: Progressing  12/22/2024 1955 by Christopher Mars RN  Outcome: Progressing     Problem: RESPIRATORY - ADULT  Goal: Achieves optimal ventilation and oxygenation  Description: INTERVENTIONS:  - Assess for changes in respiratory status  - Assess for changes in mentation and behavior  - Position to facilitate oxygenation and minimize respiratory effort  - Oxygen supplementation based on oxygen saturation or ABGs  - Provide Smoking Cessation handout, if applicable  - Encourage broncho-pulmonary hygiene including cough, deep breathe, Incentive Spirometry  - Assess the need for suctioning and perform as  needed  - Assess and instruct to report SOB or any respiratory difficulty  - Respiratory Therapy support as indicated  - Manage/alleviate anxiety  - Monitor for signs/symptoms of CO2 retention  12/23/2024 0659 by Christopher Mars RN  Outcome: Progressing  12/22/2024 1955 by Christopher Mars RN  Outcome: Progressing     Problem: GASTROINTESTINAL - ADULT  Goal: Minimal or absence of nausea and vomiting  Description: INTERVENTIONS:  - Maintain adequate hydration with IV or PO as ordered and tolerated  - Nasogastric tube to low intermittent suction as ordered  - Evaluate effectiveness of ordered antiemetic medications  - Provide nonpharmacologic comfort measures as appropriate  - Advance diet as tolerated, if ordered  - Obtain nutritional consult as needed  - Evaluate fluid balance  12/23/2024 0659 by Christopher Mars RN  Outcome: Progressing  12/22/2024 1955 by Christopher Mars RN  Outcome: Progressing  Goal: Maintains or returns to baseline bowel function  Description: INTERVENTIONS:  - Assess bowel function  - Maintain adequate hydration with IV or PO as ordered and tolerated  - Evaluate effectiveness of GI medications  - Encourage mobilization and activity  - Obtain nutritional consult as needed  - Establish a toileting routine/schedule  - Consider collaborating with pharmacy to review patient's medication profile  12/23/2024 0659 by Christopher Mars RN  Outcome: Progressing  12/22/2024 1955 by Christopher Mars RN  Outcome: Progressing     Problem: METABOLIC/FLUID AND ELECTROLYTES - ADULT  Goal: Glucose maintained within prescribed range  Description: INTERVENTIONS:  - Monitor Blood Glucose as ordered  - Assess for signs and symptoms of hyperglycemia and hypoglycemia  - Administer ordered medications to maintain glucose within target range  - Assess barriers to adequate nutritional intake and initiate nutrition consult as needed  - Instruct patient on self management of diabetes  12/23/2024 0659 by Christopher Mars RN  Outcome:  Progressing  12/22/2024 1955 by Christopher Mars RN  Outcome: Progressing  Goal: Electrolytes maintained within normal limits  Description: INTERVENTIONS:  - Monitor labs and rhythm and assess patient for signs and symptoms of electrolyte imbalances  - Administer electrolyte replacement as ordered  - Monitor response to electrolyte replacements, including rhythm and repeat lab results as appropriate  - Fluid restriction as ordered  - Instruct patient on fluid and nutrition restrictions as appropriate  12/23/2024 0659 by Christopher Mars RN  Outcome: Progressing  12/22/2024 1955 by Christopher Mars RN  Outcome: Progressing  Goal: Hemodynamic stability and optimal renal function maintained  Description: INTERVENTIONS:  - Monitor labs and assess for signs and symptoms of volume excess or deficit  - Monitor intake, output and patient weight  - Monitor urine specific gravity, serum osmolarity and serum sodium as indicated or ordered  - Monitor response to interventions for patient's volume status, including labs, urine output, blood pressure (other measures as available)  - Encourage oral intake as appropriate  - Instruct patient on fluid and nutrition restrictions as appropriate  12/23/2024 0659 by Christopher Mars RN  Outcome: Progressing  12/22/2024 1955 by Christopher Mars RN  Outcome: Progressing     Problem: SKIN/TISSUE INTEGRITY - ADULT  Goal: Skin integrity remains intact  Description: INTERVENTIONS  - Assess and document risk factors for pressure ulcer development  - Assess and document skin integrity  - Monitor for areas of redness and/or skin breakdown  - Initiate interventions, skin care algorithm/standards of care as needed  12/23/2024 0659 by Christopher Mars RN  Outcome: Progressing  12/22/2024 1955 by Christopher Mars RN  Outcome: Progressing  Goal: Incision(s), wounds(s) or drain site(s) healing without S/S of infection  Description: INTERVENTIONS:  - Assess and document risk factors for pressure ulcer development  - Assess  and document skin integrity  - Assess and document dressing/incision, wound bed, drain sites and surrounding tissue  - Implement wound care per orders  - Initiate isolation precautions as appropriate  - Initiate Pressure Ulcer prevention bundle as indicated  12/23/2024 0659 by Christopher Mars RN  Outcome: Progressing  12/22/2024 1955 by Christopher Mars RN  Outcome: Progressing  Goal: Oral mucous membranes remain intact  Description: INTERVENTIONS  - Assess oral mucosa and hygiene practices  - Implement preventative oral hygiene regimen  - Implement oral medicated treatments as ordered  12/23/2024 0659 by Christopher Mars RN  Outcome: Progressing  12/22/2024 1955 by Christopher Mars RN  Outcome: Progressing     Problem: HEMATOLOGIC - ADULT  Goal: Maintains hematologic stability  Description: INTERVENTIONS  - Assess for signs and symptoms of bleeding or hemorrhage  - Monitor labs and vital signs for trends  - Administer supportive blood products/factors, fluids and medications as ordered and appropriate  - Administer supportive blood products/factors as ordered and appropriate  12/23/2024 0659 by Christopher Mars RN  Outcome: Progressing  12/22/2024 1955 by Christopher Mars RN  Outcome: Progressing     Problem: MUSCULOSKELETAL - ADULT  Goal: Return mobility to safest level of function  Description: INTERVENTIONS:  - Assess patient stability and activity tolerance for standing, transferring and ambulating w/ or w/o assistive devices  - Assist with transfers and ambulation using safe patient handling equipment as needed  - Ensure adequate protection for wounds/incisions during mobilization  - Obtain PT/OT consults as needed  - Advance activity as appropriate  - Communicate ordered activity level and limitations with patient/family  12/23/2024 0659 by Christopher Mars RN  Outcome: Progressing  12/22/2024 1955 by Christopher Mars RN  Outcome: Progressing  Goal: Maintain proper alignment of affected body part  Description: INTERVENTIONS:  -  Support and protect limb and body alignment per provider's orders  - Instruct and reinforce with patient and family use of appropriate assistive device and precautions (e.g. spinal or hip dislocation precautions)  12/23/2024 0659 by Christopher Mars RN  Outcome: Progressing  12/22/2024 1955 by Christopher Mars RN  Outcome: Progressing     Problem: NEUROLOGICAL - ADULT  Goal: Achieves stable or improved neurological status  Description: INTERVENTIONS  - Assess for and report changes in neurological status  - Initiate measures to prevent increased intracranial pressure  - Maintain blood pressure and fluid volume within ordered parameters to optimize cerebral perfusion and minimize risk of hemorrhage  - Monitor temperature, glucose, and sodium. Initiate appropriate interventions as ordered  12/23/2024 0659 by Christopher Mars RN  Outcome: Progressing  12/22/2024 1955 by Christopher Mars RN  Outcome: Progressing     Problem: PAIN - ADULT  Goal: Verbalizes/displays adequate comfort level or patient's stated pain goal  Description: INTERVENTIONS:  - Encourage pt to monitor pain and request assistance  - Assess pain using appropriate pain scale  - Administer analgesics based on type and severity of pain and evaluate response  - Implement non-pharmacological measures as appropriate and evaluate response  - Consider cultural and social influences on pain and pain management  - Manage/alleviate anxiety  - Utilize distraction and/or relaxation techniques  - Monitor for opioid side effects  - Notify MD/LIP if interventions unsuccessful or patient reports new pain  - Anticipate increased pain with activity and pre-medicate as appropriate  12/23/2024 0659 by Christopher Mars RN  Outcome: Progressing  12/22/2024 1955 by Christopher Mars RN  Outcome: Progressing     Problem: Delirium  Goal: Minimize duration of delirium  Description: Interventions:  - Encourage use of hearing aids, eye glasses  - Promote highest level of mobility daily  - Provide  frequent reorientation  - Promote wakefulness i.e. lights on, blinds open  - Promote sleep, encourage patient's normal rest cycle i.e. lights off, TV off, minimize noise and interruptions  - Encourage family to assist in orientation and promotion of home routines  12/23/2024 0659 by Christopher Mars, RN  Outcome: Progressing  12/22/2024 1955 by Christopher Mars, RN  Outcome: Progressing

## 2024-12-23 NOTE — PROGRESS NOTES
Pulmonary/ICU/Critical Care Progress Note        Reason for Consultation: post op care  Referring Physician: Dr. Gregg        SUBJECTIVE:  Afebrile on trach collar  Doing well off drips         ALLERGIES:  Allergies[1]        MEDS:  Home Medications:  Medications Taking[2]      Scheduled Medication:   metoclopramide  5 mg Intravenous Q6H    collagenase   Topical Daily    lidocaine-menthol  1 patch Transdermal Daily    pantoprazole  40 mg Intravenous Q12H    mirtazapine  7.5 mg Oral Nightly    furosemide  40 mg Intravenous BID (Diuretic)    LORazepam  0.5 mg Intravenous QID    cloNIDine  1 patch Transdermal Weekly    carvedilol  37.5 mg Oral BID with meals    aspirin  81 mg Peg Tube Daily    isosorbide dinitrate  40 mg Per NG Tube TID (Nitrates)    hydrALAZINE  150 mg Oral Q8H GABRIEL    amLODIPine  10 mg Oral Daily    heparin  5,000 Units Subcutaneous Q8H GABRIEL    chlorhexidine gluconate  15 mL Mouth/Throat BID     Continuous Infusing Medication:   dextrose 10%      dextrose 10%       PRN Medications:    hydrALAzine    ondansetron    dextrose 10%    lipase-protease-amylase (Lip-Prot-Amyl) **AND** sodium bicarbonate    LORazepam    dextrose 10%    fentaNYL    fentaNYL    sodium chloride    albuterol    acetaminophen    ipratropium-albuterol    HYDROcodone-acetaminophen    melatonin    potassium chloride **OR** potassium chloride    magnesium sulfate in dextrose 5%    magnesium sulfate in sterile water for injection       PHYSICAL EXAM:  /60 (BP Location: Left arm)   Pulse 80   Temp 98.6 °F (37 °C) (Oral)   Resp 19   Ht 5' 5\" (1.651 m)   Wt 292 lb 12.3 oz (132.8 kg)   SpO2 98%   BMI 48.72 kg/m²   Vent Mode: PRVC/AC  FiO2 (%):  [30 %-40 %] 40 %  S RR:  [18] 18  S VT:  [550 mL] 550 mL  PEEP/CPAP (cm H2O):  [5 cm H20] 5 cm H20  MAP (cm H2O):  [11-12] 11  CONSTITUTIONAL: intubated, awake on trach collar  HEENT: atraumatic normocephalic  MOUTH: MMM  NECK/THROAT: no JVD. Trachea midline. No obvious thyromegaly. +  trach with min bleeding  LUNG: clearer BS b/l no wheezing, + crackles. Diminished at bases. Chest symmetric with respiratory motion. + midsternal surgical wound healing   HEART: regular rate and rhythm, no obvious murmers or gallops noted  ABD: soft non distended not tender. + bowel sounds. No organomegaly noted. + PEG tube   EXT: no clubbing, cyanosis, or edema noted. Pulses intact grossly      IMAGES:   CXR 12/19/24  CONCLUSION:  1. Cardiomegaly.  Tortuous aorta.   2. Mild pulmonary interstitial prominence noted throughout.   3. Superimposed right basilar and left upper lobe alveolitis has improved.   4. Support tubes and catheters satisfactory.      KUB 12/18/24  1. Mild residual small bowel dilatation left mid abdomen measuring 4.1 cm has diminished.  Findings may reflect nonspecific small-bowel enteropathy, small-bowel ileus versus less likely a small bowel obstruction.     KUB 12/16/24  CONCLUSION: Nonspecific-nonobstructive bowel gas pattern.  Dilated bowel loops demonstrated on preceding exam is not redemonstrated.     CXR 12/16/24  CONCLUSION: Right basal pulmonary opacity suggesting pneumonia.  No significant interval change since most recent exam from 12/15/24.  Findings are worse since 12/12/24.     CXR 12/15/24  On my read RLL infiltrates more dense but less pulm edema  CARDIAC/MEDIAST: Heart and mediastinum are unchanged with previous median sternotomy.   LUNGS/PLEURA: Right greater left patchy bilateral lung opacity which may be mildly increased in the right lung base.  No significant pleural effusion.  No pneumothorax.   OTHER: Stable appearance of the osseous structures.  Tracheostomy tube and right PICC line also unchanged.     KUB 12/14/24  No change in dilation of SB  Significantly limited examination.   Ongoing dilation of small bowel loops up to 4.7 cm, previously 4.5 cm. Recommend continued radiographic/clinical follow-up.       CXR 12/12/24  CONCLUSION:   1. Cardiomegaly.  Tortuous aorta.    2. Bilateral mixed multifocal airspace consolidation with slight improved aeration left lung base.   3. Tracheostomy tube overlies the tracheal air column.  Right PICC line with the tip in the SVC.      KUB 12/12/24  CONCLUSION:   1. Nonspecific small bowel dilatation left mid abdomen and central abdomen measures up to 4.5 cm.   2. Mild stool scattered throughout the colon suggests constipation fecal retention.      CT A/P 12/10/24  CONCLUSION:   1. Fluid-filled loops of small bowel, which could reflect underlying infectious/inflammatory enteritis or malabsorption in the appropriate clinical setting. Bowel loops are mildly dilated without clear transition point to suggest mechanical bowel   obstruction, although early or partial bowel obstruction could have a similar appearance.   2. Extensive distal colonic diverticulosis without CT evidence of acute complication.    3. Bladder distension despite Webb catheter placement.   4. Hepatomegaly with hepatic steatosis.   5. Diffuse anasarca.   6. Partially visualized postthoracotomy chest with possible postoperative seroma/hematoma of the anterior chest wall.   7. Bilateral pleural effusions and associated basilar atelectasis, with or without superimposed pneumonia.   8. Additional scattered patchy nodular opacities may reflect infectious process.    9. Low-density appearance of the intracardiac blood pool raises the possibility of underlying anemia. Correlate with hematologic parameters.   10. Lesser incidental findings as above.     CXR 12/9/24  CONCLUSION:   1. Cardiomegaly.  Atherosclerotic aneurysmal thoracic aorta   2. Tracheostomy tube overlies tracheal air column.   3.  Bilateral mixed alveolar and interstitial multifocal airspace opacification has progressed.        KUB 12/9/24  CONCLUSION:   No huang dilatation of the small bowel to suggest small bowel obstruction.   Large cecal stool burden which may indicate constipation. Mild stool burden throughout the  remainder of the colon.     CXR 12/5/24  No significant change when compared to 12/04/2024.     CXR 12/4/24  CONCLUSION:   1. Mild cardiomegaly and minimally prominent pulmonary vascularity but no huang pulmonary edema.  ET tube and NG tubes have been removed.  Tracheostomy is now noted in the trachea extending to the level the clavicles.  No pneumothorax.   2. Persistent patchy bilateral upper lobe airspace disease right more than left suggesting persistent bilateral upper lobe pneumonia.  Correlate clinically and continued follow-up is advised.     CXR 12/2/24  FINDINGS:   POSITION: The patient is semi-erect and slightly rotated to the right.   DEVICES: There is an endotracheal tube terminating approximately 3.9 cm above the jennyfer.  A large-bore right internal jugular central venous vascular access sheath has tip projecting in the SVC. An enteric tube extends caudally beyond the field of view.   CARDIAC/VASC: The cardiomediastinal silhouette is accentuated by AP technique, but likely stably enlarged. There is mild tortuosity of the thoracic aorta with peripheral atherosclerotic vascular calcification. The pulmonary vascularity is within normal   limits.   MEDIAST/HAMLET: Surgical clips are present.   LUNGS/PLEURA: Elevation right hemidiaphragm is noted. Multifocal patchy airspace consolidation is demonstrated, slightly worse on the right side than left. Mild interstitial opacities are seen. Additional scattered reticular opacities may be atelectatic   in nature. No large pleural effusion or pneumothorax is evident.    BONES: Median sternotomy wires are demonstrated. Mild degenerative changes of the thoracic spine are apparent. There is no fracture or visible bony lesion.   OTHER: There may be surgical clips at the level of the thoracic inlet. Leads overlie the chest and obscure underlying detail     CXR 11/27/24  Moderate pulmonary edema, progressed since 11/26/2024.     CT c/a/p  CONCLUSION:  Low-lying ETT, 0.8 cm  above the jennyfer   Multifocal bilateral pulmonary nodular consolidation s    CXR 11/24/24  FINDINGS:   CARDIAC/VASC: The cardiac silhouette is exaggerated by AP portable technique. There is stable central pulmonary venous congestion.   MEDIAST/HAMLET:   No visible mass or adenopathy.   LUNGS/PLEURA: There are stable streaky opacities at the right lung base.  No pleural effusion or pneumothorax.   BONES: Sternotomy changes are again noted.   OTHER: An endotracheal tube tip projects 3.8 cm above the jennyfer.  An enteric tube again courses into the left upper quadrant.  A right internal jugular approach Warnock-Dev catheter tip again projects over the pulmonary outflow tract.  A mediastinal drain tip again projects over the right suprahilar region.     CXR 11/23/24  On my read possible RML infiltrates  CONCLUSION: Post emergent acute type A aortic dissection repair.  Stable cardiomegaly.  Gross stable/satisfactory position of lines and tubes.  Mild pulmonary vascular congestion.       CXR 11/22/24  CONCLUSION: Post feeding tube placement with tip in the distal esophagus approximately 4 cm above the gastroesophageal junction.  Recommend advancement of the tube by approximately 10 cm to place it in the stomach.     CXR 11/22/24  CARDIAC/MEDIASTINUM: The cardiac silhouette is enlarged and unchanged.. There is a right internal jugular Warnock-Dev catheter with tip at the level of the main pulmonary artery. There is a right-sided chest tube. Endotracheal tube with tip approximately    5.3 cm above the jennyfer. There is a nasogastric tube with tip and sidehole within the stomach and below the diaphragm. There are median sternotomy wires and postoperative changes of ascending aortic repair.   LUNGS: There is pulmonary vascular redistribution without edema. Minimal blunting of the bilateral costophrenic angles may be secondary to trace pleural effusions versus chronic pleural thickening. There is mild bibasilar atelectasis. No  pneumothorax.   BONES: There is degenerative disease of the thoracic spine.     Chest CTA 11/22/24  CONCLUSION:   1. Type a aortic dissection beginning just above right renal (correction:  Right coronary)  artery and extending distally into the left common iliac artery and external iliac.  Findings were called to Dr. Echavarria at 5:52 p.m.       LABS:  Recent Labs   Lab 12/18/24  0409 12/19/24  0358 12/20/24  0626   RBC 3.10* 3.00* 2.96*   HGB 8.1* 8.0* 8.2*   HCT 25.5* 25.2* 25.2*   MCV 82.3 84.0 85.1   MCH 26.1 26.7 27.7   MCHC 31.8 31.7 32.5   RDW 19.2* 19.1* 19.6*   NEPRELIM 6.96 7.58 7.66   WBC 10.5 10.4 9.9   .0 203.0 203.0       Recent Labs   Lab 12/20/24  0626 12/21/24  0550 12/22/24  0525   * 136* 130*   BUN 27* 32* 35*   CREATSERUM 1.48* 1.52* 1.50*   EGFRCR 40* 39* 39*   CA 9.3 9.3 9.3    140 138   K 4.2 4.3 3.9    105 103   CO2 26.0 27.0 28.0       ASSESSMENT/PLAN:  Type A aortic dissection s/p repair now intubated in ICU  -off BP gtts   -multiple oral antiHTN meds for BP now restarted and pt tolerating     Post op acute respiratory failure  -complicated by extubation and reimergent intubation and significant emesis concerning for aspiration PNA vs pneumonitis  -started on zosyn-completed initial 10 day course  -checked ETT asp-candida likely contaminant  -failed multiple SBTs d/t increased RR, work of breathing, hypertension, hypoxemia, significant thick secretions  -remains MV dependent   -hx of likely TANYA and previous smoking hx. Has sign dense consolidations and atelectasis on chest CT  -s/p trach by ENT on 12/4/24 and PEG by GI on 12/5/24  -started trach/vent weaning as able-limited by HTN and  GI issues with recurrent emesis and persistent ileus  -PRN ABG and CXR  -saline nebs  -weaned off precedex  -psych consult for assistance with sedation and agitation-defer to their recs  -on scheduled clonidine patch in attempt to wean off precedex  -restarted on zosyn for recurrent  aspiration-completed another 7 day course   -daily trach collar trials    Previous hx of smoking and ETOH  -continue duonebs PRN    NAIMA on CKD and metabolic acidosis  -likely from surgery, and acute issues  -monitor UO and renal labs  -renal following   -diuresis as per cardiology and renal    Abd pain and ileus/bowel obstruction   -s/p abd CT as above  -s/p PEG tubes and now tolerating feeds    Proph:  -DVT: hep sq    Dispo:  -full code  -SW/ to assist with LTAC placement-hopeful for discharge today       Thank you for the opportunity to care for Alisha Wilde.      SIDDHARTH Rainey DO, MPH  Pulmonary Critical Care Medicine  [facility]                         [1]   Allergies  Allergen Reactions    Atorvastatin MYALGIA    Pravastatin MYALGIA    Rosuvastatin MYALGIA    Seasonal Runny nose   [2]   Outpatient Medications Marked as Taking for the 11/21/24 encounter (Hospital Encounter)   Medication Sig Dispense Refill    albuterol (2.5 MG/3ML) 0.083% Inhalation Nebu Soln Take 3 mL (2.5 mg total) by nebulization every 6 (six) hours as needed for Wheezing or Shortness of Breath. 1 each 0    aspirin 81 MG Oral Chew Tab 1 tablet (81 mg total) by Peg Tube route daily. 30 tablet 0    carvedilol 12.5 MG Oral Tab 3 tablets (37.5 mg total) by Per G Tube route 2 (two) times daily with meals. 60 tablet 0    [START ON 12/25/2024] cloNIDine 0.3 MG/24HR Transdermal Patch Weekly Place 1 patch onto the skin once a week. 4 patch 0    heparin 5000 UNIT/ML Injection Solution Inject 1 mL (5,000 Units total) into the skin every 8 (eight) hours.      hydrALAZINE 50 MG Oral Tab 3 tablets (150 mg total) by Per G Tube route every 8 (eight) hours. 90 tablet 0    isosorbide dinitrate 40 MG Oral Tab 1 tablet (40 mg total) by Per NG Tube route TID (Nitrates). 90 tablet 0    metoclopramide 5 mg/mL Injection Solution Inject 1 mL (5 mg total) into the vein every 6 (six) hours. 120 each 0     mirtazapine 7.5 MG Oral Tab 1 tablet (7.5 mg total) by Per G Tube route nightly. 30 tablet 0    Acetaminophen ER (TYLENOL 8 HOUR) 650 MG Oral Tab CR Take 2 tablets (1,300 mg total) by mouth daily as needed.      Calcium Carb-Cholecalciferol 600-10 MG-MCG Oral Tab Take 1 tablet by mouth daily.      metoprolol succinate ER 50 MG Oral Tablet 24 Hr Take 1 tablet (50 mg total) by mouth daily. 90 tablet 3    Losartan Potassium-HCTZ 100-12.5 MG Oral Tab Take 1 tablet by mouth daily. 90 tablet 3    Potassium Chloride ER 20 MEQ Oral Tab CR Take 1 tablet by mouth daily. 90 tablet 3    albuterol 108 (90 Base) MCG/ACT Inhalation Aero Soln Inhale 2 puffs into the lungs every 4 (four) hours as needed for Wheezing. 3 each 3    amLODIPine 10 MG Oral Tab Take 1 tablet (10 mg total) by mouth daily. 90 tablet 3    Ascorbic Acid (VITAMIN C) 1000 MG Oral Tab Take 1 tablet (1,000 mg total) by mouth daily.      vitamin B-12 50 MCG Oral Tab Take 1 tablet (50 mcg total) by mouth daily.

## 2024-12-23 NOTE — PLAN OF CARE
Patient placed on T-piece this morning. Patient discharging to [facility]. Family updated on patient status and plan of care.     Problem: Patient Centered Care  Goal: Patient preferences are identified and integrated in the patient's plan of care  Description: Interventions:  - What would you like us to know as we care for you? I work at [employer] and I am still very good friends with people from grade school! My brother, Osbaldo Prince, has been making decisions for me.  - Provide timely, complete, and accurate information to patient/family  - Incorporate patient and family knowledge, values, beliefs, and cultural backgrounds into the planning and delivery of care  - Encourage patient/family to participate in care and decision-making at the level they choose  - Honor patient and family perspectives and choices  Outcome: Completed     Problem: Diabetes/Glucose Control  Goal: Glucose maintained within prescribed range  Description: INTERVENTIONS:  - Monitor Blood Glucose as ordered  - Assess for signs and symptoms of hyperglycemia and hypoglycemia  - Administer ordered medications to maintain glucose within target range  - Assess barriers to adequate nutritional intake and initiate nutrition consult as needed  - Instruct patient on self management of diabetes  Outcome: Completed     Problem: Patient/Family Goals  Goal: Patient/Family Long Term Goal  Description: Patient's Long Term Goal: To discharge from the hospital safely    Interventions:  - surgical interventions, rounding, medications  - See additional Care Plan goals for specific interventions  Outcome: Completed  Goal: Patient/Family Short Term Goal  Description: Patient's Short Term Goal: to breathe better    Interventions:   - manage the ventilator for pt until she is able to breath on her own.  - See additional Care Plan goals for specific interventions  Outcome: Completed     Problem: CARDIOVASCULAR - ADULT  Goal: Maintains optimal cardiac  output and hemodynamic stability  Description: INTERVENTIONS:  - Monitor vital signs, rhythm, and trends  - Monitor for bleeding, hypotension and signs of decreased cardiac output  - Evaluate effectiveness of vasoactive medications to optimize hemodynamic stability  - Monitor arterial and/or venous puncture sites for bleeding and/or hematoma  - Assess quality of pulses, skin color and temperature  - Assess for signs of decreased coronary artery perfusion - ex. Angina  - Evaluate fluid balance, assess for edema, trend weights  Outcome: Completed  Goal: Absence of cardiac arrhythmias or at baseline  Description: INTERVENTIONS:  - Continuous cardiac monitoring, monitor vital signs, obtain 12 lead EKG if indicated  - Evaluate effectiveness of antiarrhythmic and heart rate control medications as ordered  - Initiate emergency measures for life threatening arrhythmias  - Monitor electrolytes and administer replacement therapy as ordered  Outcome: Completed     Problem: RESPIRATORY - ADULT  Goal: Achieves optimal ventilation and oxygenation  Description: INTERVENTIONS:  - Assess for changes in respiratory status  - Assess for changes in mentation and behavior  - Position to facilitate oxygenation and minimize respiratory effort  - Oxygen supplementation based on oxygen saturation or ABGs  - Provide Smoking Cessation handout, if applicable  - Encourage broncho-pulmonary hygiene including cough, deep breathe, Incentive Spirometry  - Assess the need for suctioning and perform as needed  - Assess and instruct to report SOB or any respiratory difficulty  - Respiratory Therapy support as indicated  - Manage/alleviate anxiety  - Monitor for signs/symptoms of CO2 retention  Outcome: Completed     Problem: GASTROINTESTINAL - ADULT  Goal: Minimal or absence of nausea and vomiting  Description: INTERVENTIONS:  - Maintain adequate hydration with IV or PO as ordered and tolerated  - Nasogastric tube to low intermittent suction as  ordered  - Evaluate effectiveness of ordered antiemetic medications  - Provide nonpharmacologic comfort measures as appropriate  - Advance diet as tolerated, if ordered  - Obtain nutritional consult as needed  - Evaluate fluid balance  Outcome: Completed  Goal: Maintains or returns to baseline bowel function  Description: INTERVENTIONS:  - Assess bowel function  - Maintain adequate hydration with IV or PO as ordered and tolerated  - Evaluate effectiveness of GI medications  - Encourage mobilization and activity  - Obtain nutritional consult as needed  - Establish a toileting routine/schedule  - Consider collaborating with pharmacy to review patient's medication profile  Outcome: Completed     Problem: METABOLIC/FLUID AND ELECTROLYTES - ADULT  Goal: Glucose maintained within prescribed range  Description: INTERVENTIONS:  - Monitor Blood Glucose as ordered  - Assess for signs and symptoms of hyperglycemia and hypoglycemia  - Administer ordered medications to maintain glucose within target range  - Assess barriers to adequate nutritional intake and initiate nutrition consult as needed  - Instruct patient on self management of diabetes  Outcome: Completed  Goal: Electrolytes maintained within normal limits  Description: INTERVENTIONS:  - Monitor labs and rhythm and assess patient for signs and symptoms of electrolyte imbalances  - Administer electrolyte replacement as ordered  - Monitor response to electrolyte replacements, including rhythm and repeat lab results as appropriate  - Fluid restriction as ordered  - Instruct patient on fluid and nutrition restrictions as appropriate  Outcome: Completed  Goal: Hemodynamic stability and optimal renal function maintained  Description: INTERVENTIONS:  - Monitor labs and assess for signs and symptoms of volume excess or deficit  - Monitor intake, output and patient weight  - Monitor urine specific gravity, serum osmolarity and serum sodium as indicated or ordered  - Monitor  response to interventions for patient's volume status, including labs, urine output, blood pressure (other measures as available)  - Encourage oral intake as appropriate  - Instruct patient on fluid and nutrition restrictions as appropriate  Outcome: Completed     Problem: SKIN/TISSUE INTEGRITY - ADULT  Goal: Skin integrity remains intact  Description: INTERVENTIONS  - Assess and document risk factors for pressure ulcer development  - Assess and document skin integrity  - Monitor for areas of redness and/or skin breakdown  - Initiate interventions, skin care algorithm/standards of care as needed  Outcome: Completed  Goal: Incision(s), wounds(s) or drain site(s) healing without S/S of infection  Description: INTERVENTIONS:  - Assess and document risk factors for pressure ulcer development  - Assess and document skin integrity  - Assess and document dressing/incision, wound bed, drain sites and surrounding tissue  - Implement wound care per orders  - Initiate isolation precautions as appropriate  - Initiate Pressure Ulcer prevention bundle as indicated  Outcome: Completed  Goal: Oral mucous membranes remain intact  Description: INTERVENTIONS  - Assess oral mucosa and hygiene practices  - Implement preventative oral hygiene regimen  - Implement oral medicated treatments as ordered  Outcome: Completed     Problem: HEMATOLOGIC - ADULT  Goal: Maintains hematologic stability  Description: INTERVENTIONS  - Assess for signs and symptoms of bleeding or hemorrhage  - Monitor labs and vital signs for trends  - Administer supportive blood products/factors, fluids and medications as ordered and appropriate  - Administer supportive blood products/factors as ordered and appropriate  Outcome: Completed     Problem: MUSCULOSKELETAL - ADULT  Goal: Return mobility to safest level of function  Description: INTERVENTIONS:  - Assess patient stability and activity tolerance for standing, transferring and ambulating w/ or w/o assistive  devices  - Assist with transfers and ambulation using safe patient handling equipment as needed  - Ensure adequate protection for wounds/incisions during mobilization  - Obtain PT/OT consults as needed  - Advance activity as appropriate  - Communicate ordered activity level and limitations with patient/family  Outcome: Completed  Goal: Maintain proper alignment of affected body part  Description: INTERVENTIONS:  - Support and protect limb and body alignment per provider's orders  - Instruct and reinforce with patient and family use of appropriate assistive device and precautions (e.g. spinal or hip dislocation precautions)  Outcome: Completed     Problem: NEUROLOGICAL - ADULT  Goal: Achieves stable or improved neurological status  Description: INTERVENTIONS  - Assess for and report changes in neurological status  - Initiate measures to prevent increased intracranial pressure  - Maintain blood pressure and fluid volume within ordered parameters to optimize cerebral perfusion and minimize risk of hemorrhage  - Monitor temperature, glucose, and sodium. Initiate appropriate interventions as ordered  Outcome: Completed     Problem: PAIN - ADULT  Goal: Verbalizes/displays adequate comfort level or patient's stated pain goal  Description: INTERVENTIONS:  - Encourage pt to monitor pain and request assistance  - Assess pain using appropriate pain scale  - Administer analgesics based on type and severity of pain and evaluate response  - Implement non-pharmacological measures as appropriate and evaluate response  - Consider cultural and social influences on pain and pain management  - Manage/alleviate anxiety  - Utilize distraction and/or relaxation techniques  - Monitor for opioid side effects  - Notify MD/LIP if interventions unsuccessful or patient reports new pain  - Anticipate increased pain with activity and pre-medicate as appropriate  Outcome: Completed     Problem: Delirium  Goal: Minimize duration of  delirium  Description: Interventions:  - Encourage use of hearing aids, eye glasses  - Promote highest level of mobility daily  - Provide frequent reorientation  - Promote wakefulness i.e. lights on, blinds open  - Promote sleep, encourage patient's normal rest cycle i.e. lights off, TV off, minimize noise and interruptions  - Encourage family to assist in orientation and promotion of home routines  Outcome: Completed

## 2024-12-24 NOTE — PAYOR COMM NOTE
--------------  DISCHARGE REVIEW    Payor: [employer] [facility]/HMO/POS/EPO  Subscriber #:  [id number]  Authorization Number: [id number]    Admit date: 11/22/24  Admit time:   1:28 AM  Discharge Date: 12/23/2024  1:06 PM     Admitting Physician: Sammi Kinney MD  Attending Physician:  No att. providers found  Primary Care Physician: Bridger Avendano MD

## 2024-12-24 NOTE — DISCHARGE SUMMARY
[facility]  part of [facility]     DISCHARGE SUMMARY     Alisha Wilde Patient Status:  Inpatient    10/25/1965 MRN [id number]   Location [facility] 2W/SW Attending No att. providers found   Hosp Day # 31 PCP Bridger Avendano MD     DATE OF ADMISSION: 2024  DATE OF DISCHARGE: 2024   DISPOSITION: LTAC  CONDITION ON DISCHARGE: good    DISCHARGE DIAGNOSES:  Type A aortic dissection   Ileus   Acute hypoxic respiratory failure   Aspiration pneumonia  H/o tobacco and ETOH abuse   NAIMA on CKD III   Volume overload  Essential HTN  Iron def anemia  Morbid Obesity   TANYA   Left groin wound    HISTORY OF PRESENT ILLNESS (COPIED FROM ADMISSION H&P)  The patient is a 59-year-old  female who came into the emergency department for evaluation of intense anterior upper chest/neck pain started around 2 to 3 hours earlier. Today, CBC and chemistry initially with troponin were negative. GFR is 52 which is at baseline. Liver function tests were unremarkable. She was noted to have heart block on EKG with ST and T segment changes. She was given atropine, and her heart rate improved to mid 80s. Chest x-ray showed cardiac enlargement with secondary signs of slight early fluid overload. CT angiogram of the chest, abdomen, and pelvis rule out dissection showed type A aortic dissection beginning just above the right coronary artery and extending distally into the left common iliac and external iliac resulting in significant narrowing of the left external iliac at the edge of the field view. False lumen is low right lateral along the ascending aorta and left lateral along the descending aorta. The innominate left common carotid and subclavian all off the true lumen and are patent. Celiac, superior mesenteric artery, right renal artery are patent and arise from the true lumen. The left renal artery saddles the true and false lumen, and the dissection extends into the origin of the left renal  artery and resulting in approximately 50% narrowing of its origin. Beyond the origin, left renal artery is patent. The inferior mesenteric artery from the false lumen, in the upper abdomen, the false lumen occupies 50% to 60% of the aortic diameter. In the juxtarenal region and in the infrarenal region, the false lumen flow is limited. Patient started on esmolol drip. Cardiovascular surgery consult was obtained. Patient will be taken to the OR shortly.     HOSPITAL COURSE:  This is a very brief summary of a prolonged hospitalization.  Patient was admitted as above, taken immediately to the OR for repair of her aortic dissection.  Hospital course complicated by ileus, aspiration.  She did eventually improve, however required trach and PEG tubes placed.  She will be transferred to long-term acute care.    Patient understands return to the emergency room for increased pain, fever, discharge, shortness of breath, chest pain, new neurologic symptoms, other concerning symptoms.    PHYSICAL EXAM:     Gen: A+Ox3.  No distress.   HEENT: NCAT, neck supple, no carotid bruit.  CV: RRR, S1S2, and intact distal pulses. No gallop, rub, murmur.  Pulm: Effort and breath sounds normal. No distress, wheezes, rales, rhonchi.  Abd: Soft, NTND, BS normal, no mass, no HSM, no rebound/guarding.   Neuro: Normal reflexes, CN. Sensory/motor exams grossly normal deficit. Coordination  and gait normal.   MS: No joint effusions.  No peripheral edema.  Skin: Skin is warm and dry. No rashes, erythema, diaphoresis.   Psych: Normal mood and affect. Behavior and judgment normal.     DISCHARGE MEDICATIONS     Discharge Medications        START taking these medications        Instructions Prescription details   albuterol (2.5 MG/3ML) 0.083% Nebu  Commonly known as: Ventolin  Replaces: albuterol 108 (90 Base) MCG/ACT Aers      Take 3 mL (2.5 mg total) by nebulization every 6 (six) hours as needed for Wheezing or Shortness of Breath.   Quantity: 1  each  Refills: 0     aspirin 81 MG Chew      1 tablet (81 mg total) by Peg Tube route daily.   Quantity: 30 tablet  Refills: 0     carvedilol 12.5 MG Tabs  Commonly known as: Coreg      3 tablets (37.5 mg total) by Per G Tube route 2 (two) times daily with meals.   Quantity: 60 tablet  Refills: 0     cloNIDine 0.3 MG/24HR Ptwk  Commonly known as: Catapres  Start taking on: December 25, 2024      Place 1 patch onto the skin once a week.   Quantity: 4 patch  Refills: 0     heparin 5000 UNIT/ML Soln  Commonly known as: Porcine      Inject 1 mL (5,000 Units total) into the skin every 8 (eight) hours.   Refills: 0     hydrALAZINE 50 MG Tabs  Commonly known as: Apresoline      3 tablets (150 mg total) by Per G Tube route every 8 (eight) hours.   Quantity: 90 tablet  Refills: 0     isosorbide dinitrate 40 MG Tabs  Commonly known as: ISORDIL      1 tablet (40 mg total) by Per NG Tube route TID (Nitrates).   Quantity: 90 tablet  Refills: 0     LORazepam 0.5 MG Tabs  Commonly known as: Ativan      Take 1 tablet (0.5 mg total) by mouth in the morning, at noon, and at bedtime.   Quantity: 90 tablet  Refills: 1     mirtazapine 7.5 MG Tabs  Commonly known as: Remeron      1 tablet (7.5 mg total) by Per G Tube route nightly.   Quantity: 30 tablet  Refills: 0     Naloxone HCl 4 MG/0.1ML Liqd      4 mg by Intranasal route as needed (May repeat as needed in other nostril if symptoms persist). If patient remains unresponsive, repeat dose in other nostril 2-5 minutes after first dose.   Quantity: 1 kit  Refills: 0            CONTINUE taking these medications        Instructions Prescription details   amLODIPine 10 MG Tabs  Commonly known as: Norvasc      Take 1 tablet (10 mg total) by mouth daily.   Quantity: 90 tablet  Refills: 3     furosemide 20 MG Tabs  Commonly known as: Lasix      Take 2 tablets (40 mg total) by mouth daily.   Refills: 0     metoclopramide 5 MG Tabs  Commonly known as: Reglan      Take 1 tablet (5 mg total) by  mouth every 6 (six) hours as needed.   Refills: 0            STOP taking these medications      albuterol 108 (90 Base) MCG/ACT Aers  Commonly known as: Ventolin HFA  Replaced by: albuterol (2.5 MG/3ML) 0.083% Nebu        Calcium Carb-Cholecalciferol 600-10 MG-MCG Tabs        fluticasone propionate 50 MCG/ACT Susp  Commonly known as: Flonase        fluticasone-salmeterol 250-50 MCG/ACT Aepb  Commonly known as: Advair Diskus        Losartan Potassium-HCTZ 100-12.5 MG Tabs  Commonly known as: HYZAAR        metoprolol succinate ER 50 MG Tb24  Commonly known as: Toprol XL        Potassium Chloride ER 20 MEQ Tbcr        Tylenol 8 Hour 650 MG Tbcr  Generic drug: Acetaminophen ER        vitamin B-12 50 MCG Tabs  Commonly known as: CYANOCOBALAMIN        vitamin C 1000 MG Tabs                  Where to Get Your Medications        These medications were sent to [facility]/pharmacy #[id number] - [place], [place] - [address] [phone], [phone]  [address] [address]      Phone: [phone]   albuterol (2.5 MG/3ML) 0.083% Nebu  aspirin 81 MG Chew  carvedilol 12.5 MG Tabs  cloNIDine 0.3 MG/24HR Ptwk  hydrALAZINE 50 MG Tabs  isosorbide dinitrate 40 MG Tabs  mirtazapine 7.5 MG Tabs  Naloxone HCl 4 MG/0.1ML Liqd       Please  your prescriptions at the location directed by your doctor or nurse    Bring a paper prescription for each of these medications  LORazepam 0.5 MG Tabs         CONSULTANTS  Consultants         Provider   Role Specialty     Armen Dudley MD      Consulting Physician Psychiatry     Redd, Kory Rich DO      Consulting Physician PULMONARY DISEASES     Ronnie Barrett MD      Consulting Physician SURGERY, GENERAL     Ma, Toma Barney MD      Consulting Physician GASTROENTEROLOGY     Dmitri Herman DO      Consulting Physician PULMONARY DISEASES     Myron Madrigal MD      Consulting Physician Otolaryngology Otology & Neurotology     Rin Dickerson MD       Consulting Physician GASTROENTEROLOGY     Edward Juárez DO      Consulting Physician Cardiovascular Diseases     Robbi Baron MD      Consulting Physician NEPHROLOGY     Neil Leone MD      Consulting Physician SURGERY, CARDIOVASCULAR     Sammi Kinney MD      Consulting Physician HOSPITALIST            FOLLOW UP:  Rafa Gregg MD  [address]  [address]  [place] [facility] [address]  [phone]    Follow up on 1/22/2025  Hospital follow-up appointment @ 1pm    Adán Marks MD  [address]  [address]  [place] [address]  [phone]    Follow up on 1/28/2025  Hospital follow-up appointment @ 10am    Bridger Avendano MD  [address] [address]  [phone]    Follow up on 2/3/2025  Hospital follow-up appointment @ 10:30am    The above plan and follow-up instructions were reviewed with the patient and they verbalized understanding and agreement.  They understand to return to the emergency room for any concerning signs or symptoms.  Greater than 30 minutes spent on discharge.  -----------------------    Hospital Discharge Diagnoses: aortic dissection     Lace+ Score: 72  59-90 High Risk  29-58 Medium Risk  0-28   Low Risk.    TCM Follow-Up Recommendation:  LACE > 58: High Risk of readmission after discharge from the hospital.

## 2024-12-30 ENCOUNTER — TELEPHONE (OUTPATIENT)
Dept: FAMILY MEDICINE CLINIC | Facility: CLINIC | Age: 59
End: 2024-12-30

## 2025-01-30 ENCOUNTER — MED REC SCAN ONLY (OUTPATIENT)
Dept: FAMILY MEDICINE CLINIC | Facility: CLINIC | Age: 60
End: 2025-01-30

## 2025-01-30 ENCOUNTER — TELEPHONE (OUTPATIENT)
Dept: PULMONOLOGY | Facility: CLINIC | Age: 60
End: 2025-01-30

## 2025-01-31 ENCOUNTER — OFFICE VISIT (OUTPATIENT)
Dept: PULMONOLOGY | Facility: CLINIC | Age: 60
End: 2025-01-31

## 2025-01-31 VITALS
SYSTOLIC BLOOD PRESSURE: 130 MMHG | BODY MASS INDEX: 40.02 KG/M2 | OXYGEN SATURATION: 98 % | HEART RATE: 87 BPM | DIASTOLIC BLOOD PRESSURE: 80 MMHG | HEIGHT: 65 IN | WEIGHT: 240.19 LBS

## 2025-01-31 DIAGNOSIS — J45.40 MODERATE PERSISTENT ASTHMA WITHOUT COMPLICATION (HCC): Primary | ICD-10-CM

## 2025-01-31 DIAGNOSIS — G47.33 OSA (OBSTRUCTIVE SLEEP APNEA): ICD-10-CM

## 2025-01-31 PROCEDURE — 99214 OFFICE O/P EST MOD 30 MIN: CPT | Performed by: INTERNAL MEDICINE

## 2025-01-31 NOTE — PROGRESS NOTES
Subjective:   Patient ID: Alisha Wilde is a 59 year old female.    HPI  Presented to my clinic today after she was discharged from LTAC to home last week and she was decannulated and trach site healed very well with no discharge with no problem talking and eating with no cough or dyspnea and overall feels much better  Reported chronic daytime sleepiness and fatigue and she has not started on CPAP since patient ended up in the hospital in ICU for long time for aortic dissection required prolonged intubation and then trach and PEG and then went to LTAC    Much better overall  Using albuterol only as needed  No night symptoms for cough or wheeze  No cough or sputum  No chest pain  No fever or chills  History/Other:   Review of Systems   Constitutional:  Negative for fever.   HENT:  Negative for congestion.    Respiratory:  Negative for cough and shortness of breath.    Cardiovascular: Negative.    Gastrointestinal: Negative.    Musculoskeletal: Negative.    Neurological: Negative.    Hematological: Negative.    Psychiatric/Behavioral: Negative.       Current Outpatient Medications   Medication Sig Dispense Refill    metoclopramide 5 MG Oral Tab Take 1 tablet (5 mg total) by mouth every 6 (six) hours as needed.      furosemide 20 MG Oral Tab Take 2 tablets (40 mg total) by mouth daily.      LORazepam 0.5 MG Oral Tab Take 1 tablet (0.5 mg total) by mouth in the morning, at noon, and at bedtime. 90 tablet 1    Naloxone HCl 4 MG/0.1ML Nasal Liquid 4 mg by Intranasal route as needed (May repeat as needed in other nostril if symptoms persist). If patient remains unresponsive, repeat dose in other nostril 2-5 minutes after first dose. 1 kit 0    albuterol (2.5 MG/3ML) 0.083% Inhalation Nebu Soln Take 3 mL (2.5 mg total) by nebulization every 6 (six) hours as needed for Wheezing or Shortness of Breath. 1 each 0    aspirin 81 MG Oral Chew Tab 1 tablet (81 mg total) by Peg Tube route daily. 30 tablet 0    carvedilol 12.5 MG  Oral Tab 3 tablets (37.5 mg total) by Per G Tube route 2 (two) times daily with meals. 60 tablet 0    cloNIDine 0.3 MG/24HR Transdermal Patch Weekly Place 1 patch onto the skin once a week. 4 patch 0    heparin 5000 UNIT/ML Injection Solution Inject 1 mL (5,000 Units total) into the skin every 8 (eight) hours.      hydrALAZINE 50 MG Oral Tab 3 tablets (150 mg total) by Per G Tube route every 8 (eight) hours. 90 tablet 0    isosorbide dinitrate 40 MG Oral Tab 1 tablet (40 mg total) by Per NG Tube route TID (Nitrates). 90 tablet 0    mirtazapine 7.5 MG Oral Tab 1 tablet (7.5 mg total) by Per G Tube route nightly. 30 tablet 0    amLODIPine 10 MG Oral Tab Take 1 tablet (10 mg total) by mouth daily. 90 tablet 3     Allergies:Allergies[1]    Objective:   Physical Exam  Constitutional:       General: She is not in acute distress.     Appearance: She is obese.   HENT:      Head: Normocephalic and atraumatic.      Nose: Nose normal.      Mouth/Throat:      Mouth: Mucous membranes are moist.   Eyes:      General: No scleral icterus.     Pupils: Pupils are equal, round, and reactive to light.   Neck:      Comments: Old trach site healed very well with no erythema or discharge stridor no lymphadenopathy  Cardiovascular:      Rate and Rhythm: Normal rate.      Pulses: Normal pulses.      Heart sounds: No murmur heard.     No gallop.   Pulmonary:      Effort: No respiratory distress.      Breath sounds: No stridor. No wheezing, rhonchi or rales.   Chest:      Chest wall: No tenderness.   Abdominal:      General: Abdomen is flat. Bowel sounds are normal. There is no distension.      Palpations: Abdomen is soft.      Tenderness: There is no guarding.   Musculoskeletal:      Cervical back: Normal range of motion.      Right lower leg: No edema.      Left lower leg: No edema.   Lymphadenopathy:      Cervical: No cervical adenopathy.   Skin:     General: Skin is dry.   Neurological:      Mental Status: She is oriented to person,  place, and time.         Assessment & Plan:   1. Moderate persistent asthma without complication (HCC)      1-h/o Type A aortic dissection s/p repair on 11/21/2024  Complicated course with respiratory failure and renal failure and prolonged hospitalization required prolonged /recurrent intubation required trach and PEG and then patient was discharged to LTAC.    Much better overall and recovered  decannulated and also PEG tube was removed  Trach site healed very well  Also PEG tube was removed  Much better overall and back to baseline , patient was discharged from LTAC and now at home .    2-history of mild to moderate TANYA with AHI 11.5 and 15 in supine  BMI of 40 and symptomatic    Plan :  Auto CPAP 10 CWP with mask fitting  Diet and exercise and weight reduction  Avoid driving if sleepy  Avoid sedative or narcotic alcohol    Follow-up in 2-3 months to reevaluate on cpap     3-mild intermittent asthma  Prior smoker quit 1999  Seen is better overall and controlled    Avoid triggers  Albuterol as needed  Keep updated on vaccinations    Follow-up in 3 months    Meds This Visit:  Requested Prescriptions      No prescriptions requested or ordered in this encounter       Imaging & Referrals:  None         [1]   Allergies  Allergen Reactions    Atorvastatin MYALGIA    Pravastatin MYALGIA    Rosuvastatin MYALGIA    Seasonal Runny nose

## 2025-02-03 ENCOUNTER — OFFICE VISIT (OUTPATIENT)
Dept: FAMILY MEDICINE CLINIC | Facility: CLINIC | Age: 60
End: 2025-02-03

## 2025-02-03 ENCOUNTER — TELEPHONE (OUTPATIENT)
Dept: FAMILY MEDICINE CLINIC | Facility: CLINIC | Age: 60
End: 2025-02-03

## 2025-02-03 ENCOUNTER — LAB ENCOUNTER (OUTPATIENT)
Dept: LAB | Age: 60
End: 2025-02-03
Attending: FAMILY MEDICINE
Payer: COMMERCIAL

## 2025-02-03 ENCOUNTER — TELEPHONE (OUTPATIENT)
Dept: PULMONOLOGY | Facility: CLINIC | Age: 60
End: 2025-02-03

## 2025-02-03 VITALS
SYSTOLIC BLOOD PRESSURE: 124 MMHG | DIASTOLIC BLOOD PRESSURE: 71 MMHG | HEART RATE: 82 BPM | TEMPERATURE: 99 F | OXYGEN SATURATION: 97 % | WEIGHT: 232 LBS | HEIGHT: 65 IN | BODY MASS INDEX: 38.65 KG/M2

## 2025-02-03 DIAGNOSIS — G47.33 OSA (OBSTRUCTIVE SLEEP APNEA): ICD-10-CM

## 2025-02-03 DIAGNOSIS — D50.9 IRON DEFICIENCY ANEMIA, UNSPECIFIED IRON DEFICIENCY ANEMIA TYPE: ICD-10-CM

## 2025-02-03 DIAGNOSIS — I71.010 DISSECTION OF ASCENDING AORTA (HCC): Primary | ICD-10-CM

## 2025-02-03 DIAGNOSIS — R29.898 WEAKNESS OF BOTH LEGS: ICD-10-CM

## 2025-02-03 DIAGNOSIS — R09.81 NASAL CONGESTION: ICD-10-CM

## 2025-02-03 DIAGNOSIS — E11.9 CONTROLLED TYPE 2 DIABETES MELLITUS WITHOUT COMPLICATION, WITHOUT LONG-TERM CURRENT USE OF INSULIN (HCC): ICD-10-CM

## 2025-02-03 DIAGNOSIS — I71.010 DISSECTION OF ASCENDING AORTA (HCC): ICD-10-CM

## 2025-02-03 DIAGNOSIS — I10 ESSENTIAL HYPERTENSION: ICD-10-CM

## 2025-02-03 DIAGNOSIS — B37.2 SKIN CANDIDIASIS: ICD-10-CM

## 2025-02-03 DIAGNOSIS — J45.40 MODERATE PERSISTENT ASTHMA WITHOUT COMPLICATION (HCC): ICD-10-CM

## 2025-02-03 DIAGNOSIS — Z93.0 STATUS POST TRACHEOSTOMY (HCC): ICD-10-CM

## 2025-02-03 DIAGNOSIS — N18.31 STAGE 3A CHRONIC KIDNEY DISEASE (HCC): ICD-10-CM

## 2025-02-03 LAB
ALBUMIN SERPL-MCNC: 4.6 G/DL (ref 3.2–4.8)
ALBUMIN/GLOB SERPL: 1.4 {RATIO} (ref 1–2)
ALP LIVER SERPL-CCNC: 53 U/L
ALT SERPL-CCNC: 9 U/L
ANION GAP SERPL CALC-SCNC: 13 MMOL/L (ref 0–18)
AST SERPL-CCNC: 15 U/L (ref ?–34)
BASOPHILS # BLD AUTO: 0.04 X10(3) UL (ref 0–0.2)
BASOPHILS NFR BLD AUTO: 0.5 %
BILIRUB SERPL-MCNC: 0.4 MG/DL (ref 0.2–1.1)
BUN BLD-MCNC: 25 MG/DL (ref 9–23)
BUN/CREAT SERPL: 13 (ref 10–20)
CALCIUM BLD-MCNC: 9.5 MG/DL (ref 8.7–10.4)
CHLORIDE SERPL-SCNC: 103 MMOL/L (ref 98–112)
CO2 SERPL-SCNC: 21 MMOL/L (ref 21–32)
CREAT BLD-MCNC: 1.92 MG/DL
CREAT UR-SCNC: 226.9 MG/DL
DEPRECATED RDW RBC AUTO: 50 FL (ref 35.1–46.3)
EGFRCR SERPLBLD CKD-EPI 2021: 29 ML/MIN/1.73M2 (ref 60–?)
EOSINOPHIL # BLD AUTO: 0.01 X10(3) UL (ref 0–0.7)
EOSINOPHIL NFR BLD AUTO: 0.1 %
ERYTHROCYTE [DISTWIDTH] IN BLOOD BY AUTOMATED COUNT: 16.3 % (ref 11–15)
EST. AVERAGE GLUCOSE BLD GHB EST-MCNC: 120 MG/DL (ref 68–126)
FASTING STATUS PATIENT QL REPORTED: YES
GLOBULIN PLAS-MCNC: 3.4 G/DL (ref 2–3.5)
GLUCOSE BLD-MCNC: 97 MG/DL (ref 70–99)
HBA1C MFR BLD: 5.8 % (ref ?–5.7)
HCT VFR BLD AUTO: 31.6 %
HGB BLD-MCNC: 10 G/DL
IMM GRANULOCYTES # BLD AUTO: 0.03 X10(3) UL (ref 0–1)
IMM GRANULOCYTES NFR BLD: 0.4 %
IRON SATN MFR SERPL: 6 %
IRON SERPL-MCNC: 12 UG/DL
LYMPHOCYTES # BLD AUTO: 2.06 X10(3) UL (ref 1–4)
LYMPHOCYTES NFR BLD AUTO: 24.8 %
MCH RBC QN AUTO: 26.3 PG (ref 26–34)
MCHC RBC AUTO-ENTMCNC: 31.6 G/DL (ref 31–37)
MCV RBC AUTO: 83.2 FL
MICROALBUMIN UR-MCNC: 13.2 MG/DL
MICROALBUMIN/CREAT 24H UR-RTO: 58.2 UG/MG (ref ?–30)
MONOCYTES # BLD AUTO: 1.52 X10(3) UL (ref 0.1–1)
MONOCYTES NFR BLD AUTO: 18.3 %
NEUTROPHILS # BLD AUTO: 4.64 X10 (3) UL (ref 1.5–7.7)
NEUTROPHILS # BLD AUTO: 4.64 X10(3) UL (ref 1.5–7.7)
NEUTROPHILS NFR BLD AUTO: 55.9 %
OSMOLALITY SERPL CALC.SUM OF ELEC: 288 MOSM/KG (ref 275–295)
PLATELET # BLD AUTO: 225 10(3)UL (ref 150–450)
POTASSIUM SERPL-SCNC: 3.8 MMOL/L (ref 3.5–5.1)
PROT SERPL-MCNC: 8 G/DL (ref 5.7–8.2)
RBC # BLD AUTO: 3.8 X10(6)UL
SODIUM SERPL-SCNC: 137 MMOL/L (ref 136–145)
TOTAL IRON BINDING CAPACITY: 217 UG/DL (ref 250–425)
TRANSFERRIN SERPL-MCNC: 159 MG/DL (ref 250–380)
WBC # BLD AUTO: 8.3 X10(3) UL (ref 4–11)

## 2025-02-03 PROCEDURE — 80053 COMPREHEN METABOLIC PANEL: CPT

## 2025-02-03 PROCEDURE — 99214 OFFICE O/P EST MOD 30 MIN: CPT | Performed by: FAMILY MEDICINE

## 2025-02-03 PROCEDURE — 84466 ASSAY OF TRANSFERRIN: CPT

## 2025-02-03 PROCEDURE — 82570 ASSAY OF URINE CREATININE: CPT

## 2025-02-03 PROCEDURE — 85025 COMPLETE CBC W/AUTO DIFF WBC: CPT

## 2025-02-03 PROCEDURE — 83540 ASSAY OF IRON: CPT

## 2025-02-03 PROCEDURE — 83036 HEMOGLOBIN GLYCOSYLATED A1C: CPT

## 2025-02-03 PROCEDURE — 36415 COLL VENOUS BLD VENIPUNCTURE: CPT

## 2025-02-03 PROCEDURE — 82043 UR ALBUMIN QUANTITATIVE: CPT

## 2025-02-03 RX ORDER — PANTOPRAZOLE SODIUM 40 MG/1
40 TABLET, DELAYED RELEASE ORAL
COMMUNITY

## 2025-02-03 RX ORDER — IPRATROPIUM BROMIDE AND ALBUTEROL SULFATE 2.5; .5 MG/3ML; MG/3ML
SOLUTION RESPIRATORY (INHALATION)
COMMUNITY
Start: 2025-01-30

## 2025-02-03 RX ORDER — AZELASTINE 1 MG/ML
1 SPRAY, METERED NASAL 2 TIMES DAILY
Qty: 3 EACH | Refills: 1 | Status: SHIPPED | OUTPATIENT
Start: 2025-02-03

## 2025-02-03 RX ORDER — NYSTATIN 100000 [USP'U]/G
1 POWDER TOPICAL 3 TIMES DAILY
Qty: 60 G | Refills: 2 | Status: SHIPPED | OUTPATIENT
Start: 2025-02-03 | End: 2025-05-04

## 2025-02-03 RX ORDER — HYDRALAZINE HYDROCHLORIDE 50 MG/1
50 TABLET, FILM COATED ORAL 3 TIMES DAILY
COMMUNITY

## 2025-02-03 RX ORDER — MELATONIN
1000 DAILY
COMMUNITY
End: 2025-02-03 | Stop reason: ALTCHOICE

## 2025-02-03 RX ORDER — PANTOPRAZOLE SODIUM 40 MG/1
TABLET, DELAYED RELEASE ORAL
COMMUNITY
Start: 2025-01-30 | End: 2025-02-03 | Stop reason: ALTCHOICE

## 2025-02-03 RX ORDER — FUROSEMIDE 40 MG/1
40 TABLET ORAL DAILY
COMMUNITY

## 2025-02-03 RX ORDER — CARVEDILOL 25 MG/1
37.5 TABLET ORAL 2 TIMES DAILY
COMMUNITY
Start: 2025-01-30

## 2025-02-03 RX ORDER — BUDESONIDE 0.5 MG/2ML
INHALANT ORAL
COMMUNITY
Start: 2025-01-30

## 2025-02-03 NOTE — TELEPHONE ENCOUNTER
CMN for CPAP and supplies received from [facility] and placed in Dr. Wilkerson's folder for signature.

## 2025-02-03 NOTE — PATIENT INSTRUCTIONS
Patient Care Team:  Bridger Avendano MD as PCP - General (Family Medicine)  Juan Franco MD (NEPHROLOGY)  Adán Marks MD (Cardiac Electrophysiology)  Rg Wilkerson MD (PULMONARY DISEASES)  Rafa Gregg MD (Surgery, Thoracic)

## 2025-02-03 NOTE — PROGRESS NOTES
HPI:    Patient ID: Alisha Wilde is a 59 year old female.      HPI    Chief Complaint   Patient presents with    Hospital F/U       Wt Readings from Last 6 Encounters:   02/03/25 232 lb (105.2 kg)   01/31/25 240 lb 3.2 oz (109 kg)   12/23/24 292 lb 12.3 oz (132.8 kg)   10/24/24 254 lb (115.2 kg)   05/17/24 249 lb (112.9 kg)   02/22/24 249 lb (112.9 kg)     BP Readings from Last 3 Encounters:   02/03/25 124/71   01/31/25 130/80   12/23/24 122/70     Patient here for follow up hospitalization 11/21 - 12/23    Patient was initially seen in the emergency room after she developed intense anterior upper chest neck pain.  On her EKG she was noted to have a heart block with ST segment changes.  Patient was given atropine.  Her heart rate improved to the 80s.  Chest x-ray showed cardiac enlargement with secondary signs of early fluid overload.  Patient ended having a CT angiogram of the chest abdomen pelvis to rule out dissection which showed type B aortic dissection beginning just above the right coronary artery extending distally into the left common iliac and external iliac resulting in significant narrowing of the left external iliac at the edge of the field    Patient was seen by cardiovascular surgery.  She was taken emergently to the operating room for repair of her aortic dissection.  Her hospital course was complicated by ileus and aspiration.  She did eventually improve.  She did require tracheostomy on the 12/4 /24 for chronic respiratory failure and PEG tubes.    Once stable she was transferred to acute care for   month    Patient was recently seen at the emergency room on 1/30/25 for elevated blood pressure readings  Patient was discharged on 1/29/25 and was unable to fill her medications.    Patient did see pulmonary on 1/31/2025 for follow-up.  Her tracheostomy site has healed well.  She does report chronic daytime sleepiness and fatigue.  Patient did not start CPAP she ended up in the hospital.  She was  using albuterol as needed.  No cough wheeze or shortness of breath  Request for auto CPAP was sent to [facility] by pulmonary     Bps have been good.      Review of Systems   Constitutional:  Negative for chills and fever.   Eyes:  Negative for visual disturbance.   Respiratory:  Negative for cough, shortness of breath and wheezing.    Cardiovascular:  Negative for chest pain, palpitations and leg swelling.   Genitourinary:  Negative for decreased urine volume and difficulty urinating.   Musculoskeletal:  Positive for gait problem.   Neurological:  Positive for weakness. Negative for dizziness, tremors, seizures, light-headedness, numbness and headaches.       /71   Pulse 82   Temp 99.1 °F (37.3 °C) (Temporal)   Ht 5' 5\" (1.651 m)   Wt 232 lb (105.2 kg)   SpO2 97%   BMI 38.61 kg/m²          Current Outpatient Medications   Medication Sig Dispense Refill    ASPIRIN 81 OR Take by mouth daily.      ipratropium-albuterol 0.5-2.5 (3) MG/3ML Inhalation Solution       budesonide 0.5 MG/2ML Inhalation Suspension       carvedilol 25 MG Oral Tab Take 1.5 tablets (37.5 mg total) by mouth 2 (two) times daily.      azelastine 0.1 % Nasal Solution 1 spray by Nasal route 2 (two) times daily. 3 each 1    pantoprazole 40 MG Oral Tab EC Take 1 tablet (40 mg total) by mouth every morning before breakfast.      furosemide 40 MG Oral Tab Take 1 tablet (40 mg total) by mouth daily.      hydrALAZINE 50 MG Oral Tab Take 1 tablet (50 mg total) by mouth 3 (three) times daily.      nystatin [id number] UNIT/GM External Powder Apply 1 Application topically 3 (three) times daily. 60 g 2    albuterol (2.5 MG/3ML) 0.083% Inhalation Nebu Soln Take 3 mL (2.5 mg total) by nebulization every 6 (six) hours as needed for Wheezing or Shortness of Breath. 1 each 0    cloNIDine 0.3 MG/24HR Transdermal Patch Weekly Place 1 patch onto the skin once a week. 4 patch 0    mirtazapine 7.5 MG Oral Tab 1 tablet (7.5 mg total) by Per G Tube route nightly.  30 tablet 0    amLODIPine 10 MG Oral Tab Take 1 tablet (10 mg total) by mouth daily. 90 tablet 3     Allergies:Allergies[1]   PHYSICAL EXAM:     Chief Complaint   Patient presents with    Hospital F/U      Physical Exam  Vitals and nursing note reviewed.   Constitutional:       Appearance: She is well-developed. She is obese.   Eyes:      Pupils: Pupils are equal, round, and reactive to light.   Neck:      Thyroid: No thyromegaly.   Cardiovascular:      Rate and Rhythm: Normal rate and regular rhythm.      Heart sounds: No murmur heard.  Pulmonary:      Effort: Pulmonary effort is normal.      Breath sounds: Normal breath sounds. No wheezing.   Abdominal:      Palpations: There is no mass.      Tenderness: There is no abdominal tenderness. There is no right CVA tenderness or left CVA tenderness.   Musculoskeletal:      Cervical back: Normal range of motion and neck supple.      Right lower leg: No edema.      Left lower leg: No edema.   Skin:     General: Skin is warm and dry.      Findings: Erythema and rash present.      Comments: Skin irritated and red in lower abdominal crease left side     Neurological:      Mental Status: She is alert and oriented to person, place, and time.      Gait: Gait abnormal.      Comments: Gait guarded, using walker    Psychiatric:         Mood and Affect: Mood normal.                ASSESSMENT/PLAN:     Encounter Diagnoses   Name Primary?    Dissection of ascending aorta (HCC) Yes    Controlled type 2 diabetes mellitus without complication, without long-term current use of insulin (HCC)     Essential hypertension     Stage 3a chronic kidney disease (HCC)     Weakness of both legs     Status post tracheostomy (HCC)     Moderate persistent asthma without complication (HCC)     TANYA (obstructive sleep apnea)     Nasal congestion     Iron deficiency anemia, unspecified iron deficiency anemia type     Skin candidiasis        1. Dissection of ascending aorta (HCC)  Meds / hospital notes/  consults all reviewed  Follow up cardiology tomorrow  Continue present management   - [facility] CARDIOLOGY EXTERNAL  - CBC With Differential With Platelet; Future  - [facility]    2. Controlled type 2 diabetes mellitus without complication, without long-term current use of insulin ([facility])  Recheck labs   - Comp Metabolic Panel (14); Future  - Microalb/Creat Ratio, Random Urine; Future  - Hemoglobin A1C; Future    3. Essential hypertension  Stable condition  Reviewed medications  Continue current medication management   All questions answered to the best of my ability.    - [facility] CARDIOLOGY EXTERNAL    4. Stage 3a chronic kidney disease (HCC)  Recheck kidney function  Avoid nasids   - [facility]  - Nephrology Referral - In Network    5. Weakness of both legs  Continue home Physical Therapy / Occupational Therapy   - [facility]    6. Status post tracheostomy (HCC)    - [facility]    7. Moderate persistent asthma without complication ([facility])  Stable  Continue present management  Has seen pulmonary   - [facility]    8. TANYA (obstructive sleep apnea)  Cpap machine ordered by pulmonary     9. Nasal congestion    - azelastine 0.1 % Nasal Solution; 1 spray by Nasal route 2 (two) times daily.  Dispense: 3 each; Refill: 1    10. Iron deficiency anemia, unspecified iron deficiency anemia type  Replace   - Iron And Tibc; Future  - [facility]    11. Skin candidiasis  Keep skin clean and dry   - nystatin [id number] UNIT/GM External Powder; Apply 1 Application topically 3 (three) times daily.  Dispense: 60 g; Refill: 2      Orders Placed This Encounter   Procedures    CBC With Differential With Platelet    Comp Metabolic Panel (14)    Microalb/Creat Ratio, Random Urine    Iron And Tibc    Hemoglobin A1C         The above note was creating using Dragon speech recognition technology.  Please excuse any typos    Meds This Visit:  Requested Prescriptions     Signed Prescriptions Disp Refills    azelastine 0.1 % Nasal Solution 3 each 1     Si spray by Nasal route 2 (two) times daily.    nystatin [id number] UNIT/GM External Powder 60 g 2     Sig: Apply 1 Application topically 3 (three) times daily.       Imaging & Referrals:  [facility] CARDIOLOGY EXTERNAL  [facility]  NEPHROLOGY - INTERNAL       ID#[id number]         [1]   Allergies  Allergen Reactions    Atorvastatin MYALGIA    Pravastatin MYALGIA    Rosuvastatin MYALGIA    Seasonal Runny nose

## 2025-02-05 DIAGNOSIS — E61.1 IRON DEFICIENCY: Primary | ICD-10-CM

## 2025-02-05 PROBLEM — K56.7 ILEUS (HCC): Status: RESOLVED | Noted: 2024-12-12 | Resolved: 2025-02-05

## 2025-02-05 PROBLEM — E87.6 HYPOKALEMIA: Status: RESOLVED | Noted: 2023-03-25 | Resolved: 2025-02-05

## 2025-02-05 PROBLEM — N17.9 AKI (ACUTE KIDNEY INJURY): Status: RESOLVED | Noted: 2024-11-26 | Resolved: 2025-02-05

## 2025-02-05 PROBLEM — J96.01 ACUTE HYPOXIC RESPIRATORY FAILURE (HCC): Status: RESOLVED | Noted: 2024-11-27 | Resolved: 2025-02-05

## 2025-02-05 PROBLEM — E87.0 HYPERNATREMIA: Status: RESOLVED | Noted: 2024-12-14 | Resolved: 2025-02-05

## 2025-02-05 PROBLEM — H25.13 AGE-RELATED NUCLEAR CATARACT OF BOTH EYES: Status: RESOLVED | Noted: 2024-05-22 | Resolved: 2025-02-05

## 2025-02-05 PROBLEM — R80.9 POSITIVE FOR MICROALBUMINURIA: Status: RESOLVED | Noted: 2024-10-28 | Resolved: 2025-02-05

## 2025-02-05 PROBLEM — R22.1 NECK MASS: Status: RESOLVED | Noted: 2021-01-06 | Resolved: 2025-02-05

## 2025-02-05 PROBLEM — F10.20 ALCOHOLISM (HCC): Status: RESOLVED | Noted: 2018-07-18 | Resolved: 2025-02-05

## 2025-02-05 PROBLEM — F39 EPISODIC MOOD DISORDER: Status: RESOLVED | Noted: 2024-12-12 | Resolved: 2025-02-05

## 2025-02-05 PROBLEM — E87.70 HYPERVOLEMIA: Status: RESOLVED | Noted: 2024-12-09 | Resolved: 2025-02-05

## 2025-02-05 PROBLEM — F05 DELIRIUM DUE TO ANOTHER MEDICAL CONDITION: Status: RESOLVED | Noted: 2024-12-12 | Resolved: 2025-02-05

## 2025-02-05 RX ORDER — FERROUS GLUCONATE 324(37.5)
1 TABLET ORAL DAILY
Qty: 90 TABLET | Refills: 1 | Status: SHIPPED | OUTPATIENT
Start: 2025-02-05 | End: 2025-08-04

## 2025-02-05 NOTE — TELEPHONE ENCOUNTER
Order signed and faxed back to [facility] [place]. Confirmation received and sent to scanning

## 2025-02-06 ENCOUNTER — TELEPHONE (OUTPATIENT)
Dept: FAMILY MEDICINE CLINIC | Facility: CLINIC | Age: 60
End: 2025-02-06

## 2025-02-06 DIAGNOSIS — K59.00 CONSTIPATION, UNSPECIFIED CONSTIPATION TYPE: Primary | ICD-10-CM

## 2025-02-06 RX ORDER — DOCUSATE SODIUM 100 MG/1
100 CAPSULE, LIQUID FILLED ORAL 2 TIMES DAILY PRN
Qty: 180 CAPSULE | Refills: 1 | Status: SHIPPED | OUTPATIENT
Start: 2025-02-06

## 2025-02-06 NOTE — TELEPHONE ENCOUNTER
Dr. Juan Alberto Tinajero   [facility] is not contracted with the patient's insurance.     Therefore, [facility] is unable to accept this referral.       If you need assistance re-referring this patient, please let us know.     Pallavi GABRIEL/       STAFF PLEASE ASSIST WITH HOME HEALTH FOR PATIENT

## 2025-02-06 NOTE — TELEPHONE ENCOUNTER
Darnell Avendano,    Patient is requesting continuous Family Medical Leave Act for REPAIR OF TYPE A  AORTIC DISSECTION. Patient would like form to be back dated to 11/21/25 and does not current have a Return to work date due to barely being able to walk.    Do you support?    Thank You  Wade

## 2025-02-06 NOTE — TELEPHONE ENCOUNTER
Called [facility]   Only option is to leave a confidential message    Left Voicemail to call back our office. Office phone number provided with office telephone hours.

## 2025-02-06 NOTE — TELEPHONE ENCOUNTER
Patient called to check to see if Family Medical Leave Act paperwork has been received. Located forms in originals, created email and logged for processing.  No emails or fax sent to the forms department. Patient would like a copy of completed forms emailed and mailed to her. Patient wanted to add see was paid in office    Type of Leave: Family Medical Leave Act   Reason for Leave: REPAIR OF TYPE A  AORTIC DISSECTION   Start date of leave: 11/21/24  End date of leave: unknown  How many flare ups per month/length?:  How many appts per month/length?:   Was Fee and Turnaround info Given?: Yes

## 2025-02-06 NOTE — TELEPHONE ENCOUNTER
Patient calling back, she said that [facility] is not in network for plan  Patient feels she would be able to do outpatient PT and is asking if order can be placed for Esther cummins - if agreeable order can be faxed to below. Phone number also provided by patient.   Please advise.     Fax number - [phone]  Phone - [phone]

## 2025-02-07 NOTE — TELEPHONE ENCOUNTER
Patient returned our call ( Identified name and date of birth )   Informed to please call her insurance to find out which [employer] company is accepted by her insurance and to call us back with that information    Patient verbalizes understanding and agrees with plan.    She will call her insurance on Monday

## 2025-02-10 ENCOUNTER — TELEPHONE (OUTPATIENT)
Dept: NEPHROLOGY | Facility: CLINIC | Age: 60
End: 2025-02-10

## 2025-02-10 NOTE — TELEPHONE ENCOUNTER
Spoke with patient and she has an appointment 5/7/25. Patient will call us back if she finds another doctor sooner.

## 2025-02-10 NOTE — TELEPHONE ENCOUNTER
Patient states she needs to be seen as soon as possible for decrease in kidney function. The schedule is booked out and patient prefers to see Dr. Franco please call

## 2025-02-11 ENCOUNTER — TELEPHONE (OUTPATIENT)
Dept: NEPHROLOGY | Facility: CLINIC | Age: 60
End: 2025-02-11

## 2025-02-11 ENCOUNTER — TELEPHONE (OUTPATIENT)
Dept: FAMILY MEDICINE CLINIC | Facility: CLINIC | Age: 60
End: 2025-02-11

## 2025-02-11 NOTE — TELEPHONE ENCOUNTER
Patient called back with contact information for Home Health, but she was not able to open application to see contact information during our call. She will call her insurance for guidance and call us back with the contact information tomorrow. Patient verbalized understanding. No further questions or concerns at this time.

## 2025-02-11 NOTE — TELEPHONE ENCOUNTER
Prince Harjit Ayala Asma M, MD Hello,    We wanted to report that we received the referral but we our out of network with patient's insurance.    We did try several home health to re-refer, but unfortunately none of them can take the patient as well.    Thank you for trusting us with your patients care needs. Please let us know if we can further be of assistance for you .    Aimee can be reached at:  P: [phone]  E: [email or web address]      STAFF  PLEASE HELP ASSIST WITH HOME HEALTH OPTIONS

## 2025-02-11 NOTE — TELEPHONE ENCOUNTER
Patient is scheduled for first available 5/7 and being referred by her primary care physician. Patient requesting sooner appointment with any provider due to her kidney function. Please call at [phone], thanks.

## 2025-02-12 NOTE — TELEPHONE ENCOUNTER
Called patient, informed her there is no earlier appointment for consults. Patient is already on the wait list. Advised her to check frequently for updates. Patient understanding.

## 2025-02-12 NOTE — TELEPHONE ENCOUNTER
Dr Avendano, please advise  Clinical staff, please assist    Fax Referral and a copy of the patient insurance card (front and back)    [address]  Phone [phone]    [phone]

## 2025-02-13 NOTE — TELEPHONE ENCOUNTER
Hi Dr. Avendano,     Please sign off on 2 forms if you agree to:   Family Medical Leave Act & disability due to repair of type a Aortic Dissection start date 11/21/24 end date 4-6 months pending evaluation.   -Signature page will be the first page scanned  -From your Inbasket, Highlight the patient and click Chart   -Double click the 02/03/25 Forms Completion telephone encounter/  -Scroll down to the Media section   -Click the blue Hyperlink: Juan Alberto Portillo, 2/13/25 & Juan Alberto Angeles, 2/13/25.   -Click Acknowledge located in the top right ribbon/menu   -Drag the mouse into the blank space of the document and a + sign will appear. Left click to   electronically sign the document.  -Once signed, simply exit out of the screen and you signature will be saved.     Thank you so much for your time,  Tashi   Forms Dept.

## 2025-02-13 NOTE — TELEPHONE ENCOUNTER
Hi there  Sorry I signed one form   Other I hit acknowledge prior to signing accidentally (fmal) and so couldnt sign it  Please send again

## 2025-02-14 ENCOUNTER — NURSE TRIAGE (OUTPATIENT)
Dept: FAMILY MEDICINE CLINIC | Facility: CLINIC | Age: 60
End: 2025-02-14

## 2025-02-14 DIAGNOSIS — K59.00 CONSTIPATION, UNSPECIFIED CONSTIPATION TYPE: Primary | ICD-10-CM

## 2025-02-14 RX ORDER — BISACODYL 5 MG/1
5 TABLET, DELAYED RELEASE ORAL
Qty: 30 TABLET | Refills: 0 | Status: SHIPPED | OUTPATIENT
Start: 2025-02-14

## 2025-02-14 NOTE — TELEPHONE ENCOUNTER
Can try bisacodyl or senna for additional relief.  Will send to pharmacy but if not covered, can get OTC

## 2025-02-14 NOTE — TELEPHONE ENCOUNTER
Lior Vyas,     Please sign off on form if you agree to:   Family Medical Leave Act due to  repair of type a Aortic Dissection start date 11/21/24 end date 4-6 months pending evaluation  -Signature page will be the first page scanned  -From your Inbasket, Highlight the patient and click Chart   -Double click the 02/03/25 Forms Completion telephone encounter  -Scroll down to the Media section   -Click the blue Hyperlink: Juan Alberto Oden, 2/14/25  -Click Acknowledge located in the top right ribbon/menu   -Drag the mouse into the blank space of the document and a + sign will appear. Left click to   electronically sign the document.  -Once signed, simply exit out of the screen and you signature will be saved.     Thank you so much for your time,  Tashi

## 2025-02-14 NOTE — TELEPHONE ENCOUNTER
Action Requested: Summary for Provider     []  Critical Lab, Recommendations Needed  [x] Need Additional Advice  []   FYI    []   Need Orders  [] Need Medications Sent to Pharmacy  []  Other     SUMMARY: c/o constipation for 4-5 days, no abdominal pain, no vomiting. No relief with her colace twice a day. Asking for something more.     Reason for call: Constipation  Onset: 4-5 days    The patient called stating she has been constipated for about 4-5 days. She was prescribed colace for this issue on 02/06 and she has been taking it twice a day with no relief. She is not having any abdominal pain, she states her abdomen is  a little distended. She is asking for something additional to help her have a bowel movement. Routing to pod mate due to Dr. Avendano not in office today.       Reason for Disposition   Last bowel movement (BM) > 4 days ago    Protocols used: Constipation-A-OH

## 2025-02-14 NOTE — TELEPHONE ENCOUNTER
Patient called on status of forms informed patient waiting on physician signature for one of the forms patient will call back  Monday.

## 2025-02-15 NOTE — TELEPHONE ENCOUNTER
Patient contacted and made aware of Dr. Perez's interpretation and recommendations. Patient verbalized understanding. No further questions or concerns at this time.

## 2025-02-17 NOTE — TELEPHONE ENCOUNTER
Forms completed and e-faxed to [employer] [phone]; pending confirmation. Confirmation received. [employer] message sent to patient.

## 2025-02-17 NOTE — TELEPHONE ENCOUNTER
Patient called to check status on forms- advised forms completed and faxed- she can access [employer] message with information

## 2025-02-21 ENCOUNTER — NURSE TRIAGE (OUTPATIENT)
Dept: FAMILY MEDICINE CLINIC | Facility: CLINIC | Age: 60
End: 2025-02-21

## 2025-02-21 NOTE — TELEPHONE ENCOUNTER
Action Requested: Summary for Provider     []  Critical Lab, Recommendations Needed  [] Need Additional Advice  []   FYI    []   Need Orders  [] Need Medications Sent to Pharmacy  [x]  Other     SUMMARY: Verified name and .    Patient states that she took 30 ml of dulcolax this morning and has had three loose stools today.     She states she feels lightheaded and that her blood pressure was 113/67 which she states is much lower than baseline.    She denies any fevers at this time but states that she did have some chills when she woke up this morning.    She states that she has been ensuring to stay adequately hydrated but still continues to have lightheadedness.    She was advised per protocol that she should be evaluated today- due to no openings, she was advised the go to the walk-in clinic or the urgent care    Patient states that she prefers to go to the emergency room instead and will go to the emergency room now.       Reason for call: Acute  Onset: Data Unavailable    Reason for Disposition   Lightheadedness (dizziness) present now, after 2 hours of rest and fluids    Protocols used: Dizziness-A-OH

## 2025-02-21 NOTE — TELEPHONE ENCOUNTER
Patient called states she got a call just a few minutes ago from our office--> no encounter seen, no results to convey. She states she recently cancelled a virtual visit as she is feeling better after a bowel movement. She declines re-scheduling the cancelled visit and will call us if she has any symptoms. Patient verbalized understanding. No further questions or concerns at this time.

## 2025-02-24 ENCOUNTER — ORDER TRANSCRIPTION (OUTPATIENT)
Dept: CARDIAC REHAB | Facility: HOSPITAL | Age: 60
End: 2025-02-24

## 2025-02-24 DIAGNOSIS — Z98.890 S/P AORTIC DISSECTION REPAIR: Primary | ICD-10-CM

## 2025-03-05 ENCOUNTER — TELEPHONE (OUTPATIENT)
Dept: INTERNAL MEDICINE CLINIC | Facility: CLINIC | Age: 60
End: 2025-03-05

## 2025-03-05 NOTE — TELEPHONE ENCOUNTER
Patient has questions about pantoprazole 40 mg if she needs to continue taking it or not. Patient then was inform this medication is to reduce the amount of acid reflux. Patient then stated that she will like to go over her medications with Dr. Avendano as she is having a hard time getting them refilled by the cardiologist. Patient was given a appointment for tomorrow to discuss medications.    Future Appointments   Date Time Provider Department Center   3/6/2025 11:45 AM Bridger Avendano MD [facility]

## 2025-03-06 ENCOUNTER — OFFICE VISIT (OUTPATIENT)
Dept: FAMILY MEDICINE CLINIC | Facility: CLINIC | Age: 60
End: 2025-03-06

## 2025-03-06 ENCOUNTER — TELEPHONE (OUTPATIENT)
Dept: PULMONOLOGY | Facility: CLINIC | Age: 60
End: 2025-03-06

## 2025-03-06 ENCOUNTER — CARDPULM VISIT (OUTPATIENT)
Dept: CARDIAC REHAB | Facility: HOSPITAL | Age: 60
End: 2025-03-06
Attending: INTERNAL MEDICINE
Payer: COMMERCIAL

## 2025-03-06 VITALS
OXYGEN SATURATION: 97 % | WEIGHT: 230 LBS | HEART RATE: 79 BPM | SYSTOLIC BLOOD PRESSURE: 128 MMHG | BODY MASS INDEX: 38.32 KG/M2 | HEIGHT: 65 IN | DIASTOLIC BLOOD PRESSURE: 72 MMHG

## 2025-03-06 DIAGNOSIS — M79.89 LEG SWELLING: ICD-10-CM

## 2025-03-06 DIAGNOSIS — I10 ESSENTIAL HYPERTENSION: ICD-10-CM

## 2025-03-06 DIAGNOSIS — I71.010 DISSECTION OF ASCENDING AORTA (HCC): Primary | ICD-10-CM

## 2025-03-06 DIAGNOSIS — Z98.890 S/P AORTIC DISSECTION REPAIR: Primary | ICD-10-CM

## 2025-03-06 DIAGNOSIS — N18.31 STAGE 3A CHRONIC KIDNEY DISEASE (HCC): ICD-10-CM

## 2025-03-06 DIAGNOSIS — K21.9 GASTROESOPHAGEAL REFLUX DISEASE, UNSPECIFIED WHETHER ESOPHAGITIS PRESENT: ICD-10-CM

## 2025-03-06 PROCEDURE — 99213 OFFICE O/P EST LOW 20 MIN: CPT | Performed by: FAMILY MEDICINE

## 2025-03-06 RX ORDER — PANTOPRAZOLE SODIUM 40 MG/1
40 TABLET, DELAYED RELEASE ORAL
Qty: 90 TABLET | Refills: 3 | Status: SHIPPED | OUTPATIENT
Start: 2025-03-06

## 2025-03-06 RX ORDER — EZETIMIBE 10 MG/1
10 TABLET ORAL DAILY
COMMUNITY
Start: 2025-02-19

## 2025-03-06 RX ORDER — ASPIRIN 81 MG/1
81 TABLET, COATED ORAL DAILY
COMMUNITY
Start: 2025-02-22

## 2025-03-06 RX ORDER — MELATONIN
COMMUNITY
Start: 2025-02-04

## 2025-03-06 RX ORDER — FUROSEMIDE 40 MG/1
40 TABLET ORAL DAILY
Qty: 90 TABLET | Refills: 0 | Status: SHIPPED | OUTPATIENT
Start: 2025-03-06

## 2025-03-06 RX ORDER — HYDRALAZINE HYDROCHLORIDE 50 MG/1
50 TABLET, FILM COATED ORAL 3 TIMES DAILY
Qty: 270 TABLET | Refills: 1 | Status: SHIPPED | OUTPATIENT
Start: 2025-03-06 | End: 2025-09-02

## 2025-03-06 NOTE — TELEPHONE ENCOUNTER
Received fax from [facility] via [employer] for Disposable CPAP/BIPAP filters placed in Dr. Wilkersno's folder for signature.

## 2025-03-06 NOTE — PROGRESS NOTES
HPI:    Patient ID: Alisha Wilde is a 59 year old female.      HPI    Chief Complaint   Patient presents with    Medication Follow-Up     Needs refills on hydralazine, furosemide and pantoprazole        Wt Readings from Last 6 Encounters:   03/06/25 230 lb (104.3 kg)   02/03/25 232 lb (105.2 kg)   01/31/25 240 lb 3.2 oz (109 kg)   12/23/24 292 lb 12.3 oz (132.8 kg)   10/24/24 254 lb (115.2 kg)   05/17/24 249 lb (112.9 kg)     BP Readings from Last 3 Encounters:   03/06/25 128/72   02/03/25 124/71   01/31/25 130/80     Patient was seen emergency room at [facility] for diarrhea and UTI.    Feeling much better.    She did see cardiology on 2/4/2025, follow-up aortic dissection and hypertension.  Patient is post type I aortic dissection emergent repair    Per cardiology notes (Dr Adán Marks)  ASSESSMENT    Type 1 aortic dissection  -Echocardiogram  -Follow-up with Dr. Montgomery as scheduled    Hypertension  -BP at goal  -Continue current medicationsHypercholesterolemia  -Cannot tolerate statins. Had mylagias with atorvastatin, pravastatin, and now rosuvastatin  -Could not use the Praluent injector well.  -Received Leqvio  -LDL &lt; 100OSA  -Starting CPAP soon  -F/u with sleep specialistNo f/u with EP service needed, unless new arrhythmia issues.       Started using cpap but not everyday  Saw Dr Edward Montgomery    No smoking   Stopped alcohol since nov 2024    Review of Systems   Constitutional:  Negative for chills and fever.   Eyes:  Negative for visual disturbance.   Respiratory:  Negative for cough, shortness of breath and wheezing.    Cardiovascular:  Negative for chest pain, palpitations and leg swelling.   Genitourinary:  Negative for decreased urine volume and difficulty urinating.   Neurological:  Positive for weakness. Negative for dizziness, tremors, seizures, light-headedness, numbness and headaches.        Feels some weakness left side since surgery       /72   Pulse 79   Ht 5' 5\" (1.651 m)    Wt 230 lb (104.3 kg)   SpO2 97%   BMI 38.27 kg/m²          Current Outpatient Medications   Medication Sig Dispense Refill    ASPIRIN LOW DOSE 81 MG Oral Tab EC Take 1 tablet (81 mg total) by mouth daily.      thiamine 100 MG Oral Tab Vitamin B-1 100 mg tablet, [RxNorm: 912567]      ezetimibe 10 MG Oral Tab Take 1 tablet (10 mg total) by mouth daily.      furosemide 40 MG Oral Tab Take 1 tablet (40 mg total) by mouth daily. 90 tablet 0    hydrALAZINE 50 MG Oral Tab Take 1 tablet (50 mg total) by mouth 3 (three) times daily. 270 tablet 1    pantoprazole 40 MG Oral Tab EC Take 1 tablet (40 mg total) by mouth every morning before breakfast. 90 tablet 3    bisacodyl 5 MG Oral Tab EC Take 1 tablet (5 mg total) by mouth daily as needed. 30 tablet 0    docusate sodium (COLACE) 100 MG Oral Cap Take 1 capsule (100 mg total) by mouth 2 (two) times daily as needed for constipation. 180 capsule 1    Ferrous Gluconate 324 (37.5 Fe) MG Oral Tab Take 1 tablet (324 mg total) by mouth daily. 90 tablet 1    carvedilol 25 MG Oral Tab Take 1.5 tablets (37.5 mg total) by mouth 2 (two) times daily.      azelastine 0.1 % Nasal Solution 1 spray by Nasal route 2 (two) times daily. 3 each 1    nystatin 142444 UNIT/GM External Powder Apply 1 Application topically 3 (three) times daily. 60 g 2    cloNIDine 0.3 MG/24HR Transdermal Patch Weekly Place 1 patch onto the skin once a week. 4 patch 0    mirtazapine 7.5 MG Oral Tab 1 tablet (7.5 mg total) by Per G Tube route nightly. (Patient taking differently: 1 tablet (7.5 mg total) by Per G Tube route as needed.) 30 tablet 0    amLODIPine 10 MG Oral Tab Take 1 tablet (10 mg total) by mouth daily. 90 tablet 3    ipratropium-albuterol 0.5-2.5 (3) MG/3ML Inhalation Solution  (Patient not taking: Reported on 3/6/2025)      budesonide 0.5 MG/2ML Inhalation Suspension  (Patient not taking: Reported on 3/6/2025)      albuterol (2.5 MG/3ML) 0.083% Inhalation Nebu Soln Take 3 mL (2.5 mg total) by  nebulization every 6 (six) hours as needed for Wheezing or Shortness of Breath. (Patient not taking: Reported on 3/6/2025) 1 each 0     Allergies:Allergies[1]   PHYSICAL EXAM:     Chief Complaint   Patient presents with    Medication Follow-Up     Needs refills on hydralazine, furosemide and pantoprazole       Physical Exam  Vitals and nursing note reviewed.   Constitutional:       Appearance: She is well-developed.   Eyes:      Pupils: Pupils are equal, round, and reactive to light.   Neck:      Thyroid: No thyromegaly.   Cardiovascular:      Rate and Rhythm: Normal rate and regular rhythm.      Heart sounds: No murmur heard.  Pulmonary:      Effort: Pulmonary effort is normal.      Breath sounds: Normal breath sounds. No wheezing.   Abdominal:      Palpations: There is no mass.      Tenderness: There is no abdominal tenderness. There is no right CVA tenderness or left CVA tenderness.   Musculoskeletal:      Cervical back: Normal range of motion and neck supple.      Right lower leg: No edema.      Left lower leg: No edema.   Skin:     General: Skin is warm and dry.      Findings: No rash.   Neurological:      Mental Status: She is alert and oriented to person, place, and time.                ASSESSMENT/PLAN:     Encounter Diagnoses   Name Primary?    Dissection of ascending aorta (HCC) Yes    Stage 3a chronic kidney disease (HCC)     Essential hypertension     Gastroesophageal reflux disease, unspecified whether esophagitis present     Leg swelling        1. Dissection of ascending aorta (HCC)  Continue follow up cardiology and cardiac rehab    2. Stage 3a chronic kidney disease (HCC)  Avoid nephrotoxins and nsaids  Follow-up with nephrology    3. Essential hypertension  Blood pressure is well-controlled.  Continue current medication regimen  - hydrALAZINE 50 MG Oral Tab; Take 1 tablet (50 mg total) by mouth 3 (three) times daily.  Dispense: 270 tablet; Refill: 1    4. Gastroesophageal reflux disease, unspecified  whether esophagitis present  GERD stable.  Continue current medications  - pantoprazole 40 MG Oral Tab EC; Take 1 tablet (40 mg total) by mouth every morning before breakfast.  Dispense: 90 tablet; Refill: 3    5. Leg swelling  Furosemide as needed.  As per cardiology  - furosemide 40 MG Oral Tab; Take 1 tablet (40 mg total) by mouth daily.  Dispense: 90 tablet; Refill: 0      No orders of the defined types were placed in this encounter.        The above note was creating using Dragon speech recognition technology. Please excuse any typos    Meds This Visit:  Requested Prescriptions     Signed Prescriptions Disp Refills    furosemide 40 MG Oral Tab 90 tablet 0     Sig: Take 1 tablet (40 mg total) by mouth daily.    hydrALAZINE 50 MG Oral Tab 270 tablet 1     Sig: Take 1 tablet (50 mg total) by mouth 3 (three) times daily.    pantoprazole 40 MG Oral Tab EC 90 tablet 3     Sig: Take 1 tablet (40 mg total) by mouth every morning before breakfast.       Imaging & Referrals:  None       ID#[id number]       [1]   Allergies  Allergen Reactions    Atorvastatin MYALGIA    Pravastatin MYALGIA    Rosuvastatin MYALGIA    Seasonal Runny nose

## 2025-03-10 ENCOUNTER — CARDPULM VISIT (OUTPATIENT)
Dept: CARDIAC REHAB | Facility: HOSPITAL | Age: 60
End: 2025-03-10
Attending: INTERNAL MEDICINE
Payer: COMMERCIAL

## 2025-03-10 PROCEDURE — 93798 PHYS/QHP OP CAR RHAB W/ECG: CPT

## 2025-03-12 ENCOUNTER — CARDPULM VISIT (OUTPATIENT)
Dept: CARDIAC REHAB | Facility: HOSPITAL | Age: 60
End: 2025-03-12
Attending: INTERNAL MEDICINE
Payer: COMMERCIAL

## 2025-03-12 PROCEDURE — 93798 PHYS/QHP OP CAR RHAB W/ECG: CPT

## 2025-03-14 ENCOUNTER — CARDPULM VISIT (OUTPATIENT)
Dept: CARDIAC REHAB | Facility: HOSPITAL | Age: 60
End: 2025-03-14
Attending: INTERNAL MEDICINE
Payer: COMMERCIAL

## 2025-03-14 PROCEDURE — 93798 PHYS/QHP OP CAR RHAB W/ECG: CPT

## 2025-03-17 ENCOUNTER — CARDPULM VISIT (OUTPATIENT)
Dept: CARDIAC REHAB | Facility: HOSPITAL | Age: 60
End: 2025-03-17
Attending: INTERNAL MEDICINE
Payer: COMMERCIAL

## 2025-03-17 PROCEDURE — 93798 PHYS/QHP OP CAR RHAB W/ECG: CPT

## 2025-03-19 ENCOUNTER — CARDPULM VISIT (OUTPATIENT)
Dept: CARDIAC REHAB | Facility: HOSPITAL | Age: 60
End: 2025-03-19
Attending: INTERNAL MEDICINE
Payer: COMMERCIAL

## 2025-03-19 PROCEDURE — 93798 PHYS/QHP OP CAR RHAB W/ECG: CPT

## 2025-03-20 ENCOUNTER — OFFICE VISIT (OUTPATIENT)
Facility: CLINIC | Age: 60
End: 2025-03-20
Payer: COMMERCIAL

## 2025-03-20 VITALS
SYSTOLIC BLOOD PRESSURE: 130 MMHG | BODY MASS INDEX: 38 KG/M2 | DIASTOLIC BLOOD PRESSURE: 65 MMHG | WEIGHT: 230 LBS | HEART RATE: 74 BPM

## 2025-03-20 DIAGNOSIS — N17.9 AKI (ACUTE KIDNEY INJURY): Primary | ICD-10-CM

## 2025-03-20 DIAGNOSIS — I71.00 DISSECTION OF AORTA, UNSPECIFIED PORTION OF AORTA (HCC): ICD-10-CM

## 2025-03-20 DIAGNOSIS — M79.89 LEG SWELLING: ICD-10-CM

## 2025-03-20 DIAGNOSIS — I10 ESSENTIAL HYPERTENSION: ICD-10-CM

## 2025-03-20 PROCEDURE — 99214 OFFICE O/P EST MOD 30 MIN: CPT | Performed by: INTERNAL MEDICINE

## 2025-03-20 NOTE — PROGRESS NOTES
Reason for Consult: NAIMA    HPI:       Here to establish care.  Patient was admitted through [facility] over  due to an aortic dissection.  She had an emergent surgical repair and NAIMA postop with hypertensive urgency    Her postop course was very jorge a, and she had fluid overload.  She also had a very difficult time with breathing trials.  She required a tracheostomy    Creatinine has gone up to 2 on her February labs.      HISTORY:  Past Medical History:    Chronic kidney disease (CKD)    Esophageal reflux    High blood pressure    High cholesterol    HYPERTENSION    Hypertension    Unspecified essential hypertension      Past Surgical History:   Procedure Laterality Date    Anesth,open heart surgery  2024    Colonoscopy N/A 2018    Procedure: COLONOSCOPY;  Surgeon: Magdy Webber MD;  Location: [facility] ENDOSCOPY    Endometrial ablation      child passed away for 5 mins          1    Other surgical history  2011    cysto-Dr. Lacey -- pt denies      Family History   Problem Relation Age of Onset    Hypertension Mother     Heart Disorder Mother 70    Other (Other) Mother         kidney failure    Hypertension Father     Hypertension Maternal Grandfather     Cancer Neg     Diabetes Neg     Glaucoma Neg     Macular degeneration Neg       Social History:   Social History     Socioeconomic History    Marital status: Single   Tobacco Use    Smoking status: Former     Current packs/day: 0.00     Average packs/day: 1 pack/day for 13.0 years (13.0 ttl pk-yrs)     Types: Cigarettes     Start date:      Quit date:      Years since quittin.2    Smokeless tobacco: Former   Vaping Use    Vaping status: Never Used   Substance and Sexual Activity    Alcohol use: Not Currently     Alcohol/week: 1.0 - 2.0 standard drink of alcohol     Types: 1 - 2 Cans of beer per week     Comment: every day  NOT DRINKING FOR LAS 4 MONTHS    Drug use: No     Social Drivers of Health      Food Insecurity: Unknown (11/22/2024)    Food Insecurity     Food Insecurity: Patient unable to answer   Transportation Needs: Unknown (11/22/2024)    Transportation Needs     Lack of Transportation: Patient unable to answer   Housing Stability: Unknown (11/22/2024)    Housing Stability     Housing Instability: Patient unable to answer        Medications (Active prior to today's visit):  Current Outpatient Medications   Medication Sig Dispense Refill    ASPIRIN LOW DOSE 81 MG Oral Tab EC Take 1 tablet (81 mg total) by mouth daily.      thiamine 100 MG Oral Tab Vitamin B-1 100 mg tablet, [RxNorm: 697385]      ezetimibe 10 MG Oral Tab Take 1 tablet (10 mg total) by mouth daily.      hydrALAZINE 50 MG Oral Tab Take 1 tablet (50 mg total) by mouth 3 (three) times daily. 270 tablet 1    pantoprazole 40 MG Oral Tab EC Take 1 tablet (40 mg total) by mouth every morning before breakfast. 90 tablet 3    bisacodyl 5 MG Oral Tab EC Take 1 tablet (5 mg total) by mouth daily as needed. 30 tablet 0    docusate sodium (COLACE) 100 MG Oral Cap Take 1 capsule (100 mg total) by mouth 2 (two) times daily as needed for constipation. 180 capsule 1    Ferrous Gluconate 324 (37.5 Fe) MG Oral Tab Take 1 tablet (324 mg total) by mouth daily. 90 tablet 1    carvedilol 25 MG Oral Tab Take 1.5 tablets (37.5 mg total) by mouth daily.      azelastine 0.1 % Nasal Solution 1 spray by Nasal route 2 (two) times daily. 3 each 1    mirtazapine 7.5 MG Oral Tab 1 tablet (7.5 mg total) by Per G Tube route nightly. (Patient taking differently: 1 tablet (7.5 mg total) by Per G Tube route as needed.) 30 tablet 0    amLODIPine 10 MG Oral Tab Take 1 tablet (10 mg total) by mouth daily. 90 tablet 3    ipratropium-albuterol 0.5-2.5 (3) MG/3ML Inhalation Solution  (Patient not taking: Reported on 3/20/2025)      budesonide 0.5 MG/2ML Inhalation Suspension  (Patient not taking: Reported on 3/20/2025)      nystatin [id number] UNIT/GM External Powder Apply 1  Application topically 3 (three) times daily. 60 g 2    albuterol (2.5 MG/3ML) 0.083% Inhalation Nebu Soln Take 3 mL (2.5 mg total) by nebulization every 6 (six) hours as needed for Wheezing or Shortness of Breath. (Patient not taking: Reported on 3/20/2025) 1 each 0       Allergies:  Allergies[1]      ROS:     Constitutional:  Negative for decreased activity, fever, irritability and lethargy  ENMT:  Negative for ear drainage, hearing loss and nasal drainage  Eyes:  Negative for eye discharge and vision loss  Cardiovascular:  Negative for chest pain, sobs  Respiratory:  Negative for cough, dyspnea and wheezing  Gastrointestinal:  Negative for abdominal pain, constipation  Genitourinary:  Negative for dysuria and hematuria  Endocrine:  Negative for abnormal sleep patterns, increased activity  Hema/Lymph:  Negative for easy bleeding and easy bruising  Integumentary:  Negative for pruritus and rash  Musculoskeletal:  Negative for bone/joint symptoms  Neurological:  Negative for gait disturbance  Psychiatric:  Negative for inappropriate interaction and psychiatric symptoms      Vitals:    03/20/25 1312   BP: 130/65   Pulse: 74       PHYSICAL EXAM:   Constitutional: appears well hydrated alert and responsive no acute distress noted  Head/Face: normocephalic  Eyes/Vision: normal extraocular motion is intact  Nose/Mouth/Throat:mucous membranes are moist   Neck/Thyroid: neck is supple without adenopathy  Lymphatic: no abnormal cervical, supraclavicular adenopathy is noted  Respiratory:  lungs are clear to auscultation bilaterally  Cardiovascular: regular rate and rhythm  Abdomen: soft, non-tender, non-distended, BS normal  Skin/Hair: no unusual rashes present  Back/Spine: no abnormalities noted  Musculoskeletal:  no deformities  Extremities: no edema  Neurological:  Grossly normal       Lab Results   Component Value Date    GLU 97 02/03/2025     02/03/2025    K 3.8 02/03/2025     02/03/2025    CO2 21.0 02/03/2025     ANIONGAP 13 02/03/2025    BUN 25 (H) 02/03/2025    CREATSERUM 1.92 (H) 02/03/2025    CA 9.5 02/03/2025    OSMOCALC 288 02/03/2025    EGFRCR 29 (L) 02/03/2025    ALB 4.6 02/03/2025    PHOS 3.2 12/22/2024         ASSESSMENT/PLAN:   Assessment   1. NAIMA (acute kidney injury)  The patient's GFR was 50 on her discharge from the hospital.  I am unsure as to why her GFR has dropped to 29.  Will stop furosemide.  Recheck labs  - Renal Function Panel; Future  - Microalb/Creat Ratio, Random Urine; Future  - Vitamin D; Future    2. Essential hypertension  Blood pressure is well-controlled on hydralazine, carvedilol, amlodipine, hydralazine    I will stop clonidine today.  She is supposed to record her blood pressures and she will call me with them    3. Dissection of aorta, unspecified portion of aorta (HCC)  Good blood pressure control will be important.  She is following with cardiology             Orders This Visit:  Orders Placed This Encounter   Procedures    Renal Function Panel    Microalb/Creat Ratio, Random Urine    Vitamin D       Meds This Visit:  Requested Prescriptions      No prescriptions requested or ordered in this encounter       Imaging & Referrals:  None     3/20/2025   ZAY LINARES MD    Return in about 1 month (around 4/20/2025).         [1]   Allergies  Allergen Reactions    Atorvastatin MYALGIA    Pravastatin MYALGIA    Rosuvastatin MYALGIA    Seasonal Runny nose

## 2025-03-21 ENCOUNTER — LAB ENCOUNTER (OUTPATIENT)
Dept: LAB | Age: 60
End: 2025-03-21
Attending: INTERNAL MEDICINE
Payer: COMMERCIAL

## 2025-03-21 DIAGNOSIS — E78.00 PURE HYPERCHOLESTEROLEMIA: Primary | ICD-10-CM

## 2025-03-21 DIAGNOSIS — N17.9 AKI (ACUTE KIDNEY INJURY): ICD-10-CM

## 2025-03-21 LAB
ALBUMIN SERPL-MCNC: 4.7 G/DL (ref 3.2–4.8)
ANION GAP SERPL CALC-SCNC: 8 MMOL/L (ref 0–18)
BUN BLD-MCNC: 17 MG/DL (ref 9–23)
BUN/CREAT SERPL: 10.8 (ref 10–20)
CALCIUM BLD-MCNC: 9.7 MG/DL (ref 8.7–10.4)
CHLORIDE SERPL-SCNC: 107 MMOL/L (ref 98–112)
CHOLEST SERPL-MCNC: 159 MG/DL (ref ?–200)
CO2 SERPL-SCNC: 24 MMOL/L (ref 21–32)
CREAT BLD-MCNC: 1.57 MG/DL
CREAT UR-SCNC: 143.5 MG/DL
EGFRCR SERPLBLD CKD-EPI 2021: 37 ML/MIN/1.73M2 (ref 60–?)
FASTING PATIENT LIPID ANSWER: YES
GLUCOSE BLD-MCNC: 107 MG/DL (ref 70–99)
HDLC SERPL-MCNC: 36 MG/DL (ref 40–59)
LDLC SERPL CALC-MCNC: 102 MG/DL (ref ?–100)
MICROALBUMIN UR-MCNC: 1.1 MG/DL
MICROALBUMIN/CREAT 24H UR-RTO: 7.7 UG/MG (ref ?–30)
NONHDLC SERPL-MCNC: 123 MG/DL (ref ?–130)
OSMOLALITY SERPL CALC.SUM OF ELEC: 290 MOSM/KG (ref 275–295)
PHOSPHATE SERPL-MCNC: 3.8 MG/DL (ref 2.4–5.1)
POTASSIUM SERPL-SCNC: 3.9 MMOL/L (ref 3.5–5.1)
SODIUM SERPL-SCNC: 139 MMOL/L (ref 136–145)
TRIGL SERPL-MCNC: 113 MG/DL (ref 30–149)
VIT D+METAB SERPL-MCNC: 55.1 NG/ML (ref 30–100)
VLDLC SERPL CALC-MCNC: 19 MG/DL (ref 0–30)

## 2025-03-21 PROCEDURE — 82172 ASSAY OF APOLIPOPROTEIN: CPT

## 2025-03-21 PROCEDURE — 80061 LIPID PANEL: CPT

## 2025-03-21 PROCEDURE — 80069 RENAL FUNCTION PANEL: CPT

## 2025-03-21 PROCEDURE — 82043 UR ALBUMIN QUANTITATIVE: CPT

## 2025-03-21 PROCEDURE — 36415 COLL VENOUS BLD VENIPUNCTURE: CPT

## 2025-03-21 PROCEDURE — 82570 ASSAY OF URINE CREATININE: CPT

## 2025-03-21 PROCEDURE — 82306 VITAMIN D 25 HYDROXY: CPT

## 2025-03-24 ENCOUNTER — CARDPULM VISIT (OUTPATIENT)
Dept: CARDIAC REHAB | Facility: HOSPITAL | Age: 60
End: 2025-03-24
Attending: INTERNAL MEDICINE
Payer: COMMERCIAL

## 2025-03-24 ENCOUNTER — TELEPHONE (OUTPATIENT)
Dept: NEPHROLOGY | Facility: CLINIC | Age: 60
End: 2025-03-24

## 2025-03-24 DIAGNOSIS — N18.9 CHRONIC KIDNEY DISEASE, UNSPECIFIED CKD STAGE: Primary | ICD-10-CM

## 2025-03-24 PROCEDURE — 93798 PHYS/QHP OP CAR RHAB W/ECG: CPT

## 2025-03-24 RX ORDER — CLONIDINE 0.3 MG/24H
1 PATCH, EXTENDED RELEASE TRANSDERMAL WEEKLY
COMMUNITY

## 2025-03-24 NOTE — TELEPHONE ENCOUNTER
Okay, yes agree with resuming clonidine based on that 1 blood pressure    I do believe she can stay off furosemide though    Agree, keep April appointment and then we can recheck everything at that time.  Labs are ordered

## 2025-03-24 NOTE — TELEPHONE ENCOUNTER
----- Message from ZAY LINARES sent at 3/24/2025  9:00 AM CDT -----  Please let Alisha know that her kidney function looked better on the labs that she did the other day.  How is her blood pressure off the clonidine and furosemide?    I would like to see her again in September.  Please schedule her for that.  She should do labs at that time to

## 2025-03-24 NOTE — TELEPHONE ENCOUNTER
Dr Mcarthur informed patient of note below  and last name verified,verbalized understanding  ,reported blood pressure has been high,    Bp readings   3/21- 130/77  3//80  3//86 pulse ranges in 70's   3/24 -161/87 80    Patient concerned and restarted the patch Clonidine 0.3 mg   Is taking hydralazine 50 mg tid,Carvedilol 25 taking 1.5 tablets (37.5 mg) daily ,Amlodipine 10 mg daily Advised to continue checking BP and HR make a list to call for any concerns ,asking if follow up  will in a month pt already scheduled on 25 or in Sept .

## 2025-03-25 LAB — APOLIPOPROTEIN B: 90 MG/DL

## 2025-03-26 ENCOUNTER — CARDPULM VISIT (OUTPATIENT)
Dept: CARDIAC REHAB | Facility: HOSPITAL | Age: 60
End: 2025-03-26
Attending: INTERNAL MEDICINE
Payer: COMMERCIAL

## 2025-03-26 PROCEDURE — 93798 PHYS/QHP OP CAR RHAB W/ECG: CPT

## 2025-03-28 ENCOUNTER — CARDPULM VISIT (OUTPATIENT)
Dept: CARDIAC REHAB | Facility: HOSPITAL | Age: 60
End: 2025-03-28
Attending: INTERNAL MEDICINE
Payer: COMMERCIAL

## 2025-03-28 PROCEDURE — 93798 PHYS/QHP OP CAR RHAB W/ECG: CPT

## 2025-03-30 ENCOUNTER — HOSPITAL ENCOUNTER (EMERGENCY)
Facility: HOSPITAL | Age: 60
Discharge: ANOTHER HOSPITAL | End: 2025-03-30
Attending: EMERGENCY MEDICINE
Payer: COMMERCIAL

## 2025-03-30 ENCOUNTER — APPOINTMENT (OUTPATIENT)
Dept: CT IMAGING | Facility: HOSPITAL | Age: 60
End: 2025-03-30
Attending: EMERGENCY MEDICINE
Payer: COMMERCIAL

## 2025-03-30 ENCOUNTER — HOSPITAL ENCOUNTER (INPATIENT)
Facility: HOSPITAL | Age: 60
LOS: 16 days | Discharge: NURSING, REHAB OR LONG-TERM CARE | End: 2025-04-15
Attending: STUDENT IN AN ORGANIZED HEALTH CARE EDUCATION/TRAINING PROGRAM | Admitting: STUDENT IN AN ORGANIZED HEALTH CARE EDUCATION/TRAINING PROGRAM
Payer: COMMERCIAL

## 2025-03-30 VITALS
TEMPERATURE: 98 F | BODY MASS INDEX: 34.99 KG/M2 | WEIGHT: 210 LBS | HEIGHT: 65 IN | SYSTOLIC BLOOD PRESSURE: 111 MMHG | DIASTOLIC BLOOD PRESSURE: 68 MMHG | HEART RATE: 68 BPM | RESPIRATION RATE: 17 BRPM | OXYGEN SATURATION: 98 %

## 2025-03-30 DIAGNOSIS — I71.00 DISSECTION OF AORTA, UNSPECIFIED PORTION OF AORTA (HCC): Primary | ICD-10-CM

## 2025-03-30 DIAGNOSIS — R09.81 NASAL CONGESTION: ICD-10-CM

## 2025-03-30 PROBLEM — I10 BENIGN ESSENTIAL HTN: Status: ACTIVE | Noted: 2018-07-18

## 2025-03-30 LAB
ALBUMIN SERPL-MCNC: 3.3 G/DL (ref 3.2–4.8)
ALBUMIN/GLOB SERPL: 1.4 {RATIO} (ref 1–2)
ALP LIVER SERPL-CCNC: 46 U/L
ALT SERPL-CCNC: <7 U/L
ANION GAP SERPL CALC-SCNC: 11 MMOL/L (ref 0–18)
ANION GAP SERPL CALC-SCNC: 9 MMOL/L (ref 0–18)
ANTIBODY SCREEN: NEGATIVE
APTT PPP: 29.6 SECONDS (ref 23–36)
AST SERPL-CCNC: <8 U/L (ref ?–34)
ATRIAL RATE: 80 BPM
BASOPHILS # BLD AUTO: 0.03 X10(3) UL (ref 0–0.2)
BASOPHILS # BLD AUTO: 0.03 X10(3) UL (ref 0–0.2)
BASOPHILS NFR BLD AUTO: 0.4 %
BASOPHILS NFR BLD AUTO: 0.5 %
BILIRUB SERPL-MCNC: 0.2 MG/DL (ref 0.2–1.1)
BUN BLD-MCNC: 12 MG/DL (ref 9–23)
BUN BLD-MCNC: 16 MG/DL (ref 9–23)
BUN/CREAT SERPL: 11.7 (ref 10–20)
CALCIUM BLD-MCNC: 7.4 MG/DL (ref 8.7–10.6)
CALCIUM BLD-MCNC: 9.4 MG/DL (ref 8.7–10.4)
CHLORIDE SERPL-SCNC: 108 MMOL/L (ref 98–112)
CHLORIDE SERPL-SCNC: 116 MMOL/L (ref 98–112)
CO2 SERPL-SCNC: 18 MMOL/L (ref 21–32)
CO2 SERPL-SCNC: 22 MMOL/L (ref 21–32)
CREAT BLD-MCNC: 0.92 MG/DL
CREAT BLD-MCNC: 1.37 MG/DL
DEPRECATED RDW RBC AUTO: 45.6 FL (ref 35.1–46.3)
EGFRCR SERPLBLD CKD-EPI 2021: 44 ML/MIN/1.73M2 (ref 60–?)
EGFRCR SERPLBLD CKD-EPI 2021: 71 ML/MIN/1.73M2 (ref 60–?)
EOSINOPHIL # BLD AUTO: 0.04 X10(3) UL (ref 0–0.7)
EOSINOPHIL # BLD AUTO: 0.1 X10(3) UL (ref 0–0.7)
EOSINOPHIL NFR BLD AUTO: 0.6 %
EOSINOPHIL NFR BLD AUTO: 1.3 %
ERYTHROCYTE [DISTWIDTH] IN BLOOD BY AUTOMATED COUNT: 15.9 %
ERYTHROCYTE [DISTWIDTH] IN BLOOD BY AUTOMATED COUNT: 15.9 % (ref 11–15)
GLOBULIN PLAS-MCNC: 2.3 G/DL (ref 2–3.5)
GLUCOSE BLD-MCNC: 136 MG/DL (ref 70–99)
GLUCOSE BLD-MCNC: 84 MG/DL (ref 70–99)
GLUCOSE BLDC GLUCOMTR-MCNC: 119 MG/DL (ref 70–99)
HCT VFR BLD AUTO: 31.5 %
HCT VFR BLD AUTO: 32.1 %
HGB BLD-MCNC: 10 G/DL
HGB BLD-MCNC: 9.8 G/DL
IMM GRANULOCYTES # BLD AUTO: 0.02 X10(3) UL (ref 0–1)
IMM GRANULOCYTES # BLD AUTO: 0.02 X10(3) UL (ref 0–1)
IMM GRANULOCYTES NFR BLD: 0.3 %
IMM GRANULOCYTES NFR BLD: 0.3 %
INR BLD: 1.04 (ref 0.8–1.2)
INR BLD: 1.14 (ref 0.8–1.2)
LYMPHOCYTES # BLD AUTO: 1.67 X10(3) UL (ref 1–4)
LYMPHOCYTES # BLD AUTO: 1.69 X10(3) UL (ref 1–4)
LYMPHOCYTES NFR BLD AUTO: 21.2 %
LYMPHOCYTES NFR BLD AUTO: 26.2 %
MCH RBC QN AUTO: 24.3 PG (ref 26–34)
MCH RBC QN AUTO: 24.9 PG (ref 26–34)
MCHC RBC AUTO-ENTMCNC: 30.5 G/DL (ref 31–37)
MCHC RBC AUTO-ENTMCNC: 31.7 G/DL (ref 31–37)
MCV RBC AUTO: 78.4 FL
MCV RBC AUTO: 79.5 FL
MONOCYTES # BLD AUTO: 0.4 X10(3) UL (ref 0.1–1)
MONOCYTES # BLD AUTO: 0.68 X10(3) UL (ref 0.1–1)
MONOCYTES NFR BLD AUTO: 6.2 %
MONOCYTES NFR BLD AUTO: 8.7 %
MRSA DNA SPEC QL NAA+PROBE: NEGATIVE
NEUTROPHILS # BLD AUTO: 4.26 X10 (3) UL (ref 1.5–7.7)
NEUTROPHILS # BLD AUTO: 4.26 X10(3) UL (ref 1.5–7.7)
NEUTROPHILS # BLD AUTO: 5.36 X10 (3) UL (ref 1.5–7.7)
NEUTROPHILS # BLD AUTO: 5.36 X10(3) UL (ref 1.5–7.7)
NEUTROPHILS NFR BLD AUTO: 66.2 %
NEUTROPHILS NFR BLD AUTO: 68.1 %
OSMOLALITY SERPL CALC.SUM OF ELEC: 291 MOSM/KG (ref 275–295)
OSMOLALITY SERPL CALC.SUM OF ELEC: 299 MOSM/KG (ref 275–295)
P AXIS: 58 DEGREES
P-R INTERVAL: 188 MS
PHOSPHATE SERPL-MCNC: 2.5 MG/DL (ref 2.4–5.1)
PLATELET # BLD AUTO: 268 10(3)UL (ref 150–450)
PLATELET # BLD AUTO: 275 10(3)UL (ref 150–450)
POTASSIUM SERPL-SCNC: 3.2 MMOL/L (ref 3.5–5.1)
POTASSIUM SERPL-SCNC: 4.1 MMOL/L (ref 3.5–5.1)
PROT SERPL-MCNC: 5.6 G/DL (ref 5.7–8.2)
PROTHROMBIN TIME: 14.2 SECONDS (ref 11.6–14.8)
PROTHROMBIN TIME: 14.7 SECONDS (ref 11.6–14.8)
Q-T INTERVAL: 386 MS
QRS DURATION: 90 MS
QTC CALCULATION (BEZET): 445 MS
R AXIS: 12 DEGREES
RBC # BLD AUTO: 4.02 X10(6)UL
RBC # BLD AUTO: 4.04 X10(6)UL
RH BLOOD TYPE: POSITIVE
SODIUM SERPL-SCNC: 139 MMOL/L (ref 136–145)
SODIUM SERPL-SCNC: 145 MMOL/L (ref 136–145)
T AXIS: 67 DEGREES
TROPONIN I SERPL HS-MCNC: 8 NG/L
VENTRICULAR RATE: 80 BPM
WBC # BLD AUTO: 6.4 X10(3) UL (ref 4–11)
WBC # BLD AUTO: 7.9 X10(3) UL (ref 4–11)

## 2025-03-30 PROCEDURE — 5A09357 ASSISTANCE WITH RESPIRATORY VENTILATION, LESS THAN 24 CONSECUTIVE HOURS, CONTINUOUS POSITIVE AIRWAY PRESSURE: ICD-10-PCS | Performed by: STUDENT IN AN ORGANIZED HEALTH CARE EDUCATION/TRAINING PROGRAM

## 2025-03-30 PROCEDURE — 74175 CTA ABDOMEN W/CONTRAST: CPT | Performed by: EMERGENCY MEDICINE

## 2025-03-30 PROCEDURE — 4A133B1 MONITORING OF ARTERIAL PRESSURE, PERIPHERAL, PERCUTANEOUS APPROACH: ICD-10-PCS | Performed by: EMERGENCY MEDICINE

## 2025-03-30 PROCEDURE — 99291 CRITICAL CARE FIRST HOUR: CPT | Performed by: INTERNAL MEDICINE

## 2025-03-30 PROCEDURE — 99223 1ST HOSP IP/OBS HIGH 75: CPT | Performed by: INTERNAL MEDICINE

## 2025-03-30 PROCEDURE — 36620 INSERTION CATHETER ARTERY: CPT | Performed by: EMERGENCY MEDICINE

## 2025-03-30 PROCEDURE — 4A133J1 MONITORING OF ARTERIAL PULSE, PERIPHERAL, PERCUTANEOUS APPROACH: ICD-10-PCS | Performed by: EMERGENCY MEDICINE

## 2025-03-30 PROCEDURE — 71275 CT ANGIOGRAPHY CHEST: CPT | Performed by: EMERGENCY MEDICINE

## 2025-03-30 PROCEDURE — 03HY32Z INSERTION OF MONITORING DEVICE INTO UPPER ARTERY, PERCUTANEOUS APPROACH: ICD-10-PCS | Performed by: EMERGENCY MEDICINE

## 2025-03-30 RX ORDER — ONDANSETRON 2 MG/ML
4 INJECTION INTRAMUSCULAR; INTRAVENOUS EVERY 6 HOURS PRN
Status: DISCONTINUED | OUTPATIENT
Start: 2025-03-30 | End: 2025-04-02

## 2025-03-30 RX ORDER — MORPHINE SULFATE 4 MG/ML
4 INJECTION, SOLUTION INTRAMUSCULAR; INTRAVENOUS ONCE
Status: DISCONTINUED | OUTPATIENT
Start: 2025-03-30 | End: 2025-03-30

## 2025-03-30 RX ORDER — ALPRAZOLAM 0.5 MG
0.5 TABLET ORAL 3 TIMES DAILY PRN
Status: DISCONTINUED | OUTPATIENT
Start: 2025-03-30 | End: 2025-04-02

## 2025-03-30 RX ORDER — ENOXAPARIN SODIUM 100 MG/ML
40 INJECTION SUBCUTANEOUS DAILY
Status: DISCONTINUED | OUTPATIENT
Start: 2025-03-30 | End: 2025-03-30

## 2025-03-30 RX ORDER — BISACODYL 10 MG
10 SUPPOSITORY, RECTAL RECTAL
Status: DISCONTINUED | OUTPATIENT
Start: 2025-03-30 | End: 2025-03-30

## 2025-03-30 RX ORDER — SODIUM CHLORIDE 9 MG/ML
INJECTION, SOLUTION INTRAVENOUS CONTINUOUS
Status: CANCELLED | OUTPATIENT
Start: 2025-03-30

## 2025-03-30 RX ORDER — NITROGLYCERIN 20 MG/100ML
INJECTION INTRAVENOUS CONTINUOUS
Status: DISCONTINUED | OUTPATIENT
Start: 2025-03-30 | End: 2025-03-30

## 2025-03-30 RX ORDER — SODIUM PHOSPHATE, DIBASIC AND SODIUM PHOSPHATE, MONOBASIC 7; 19 G/230ML; G/230ML
1 ENEMA RECTAL ONCE AS NEEDED
Status: DISCONTINUED | OUTPATIENT
Start: 2025-03-30 | End: 2025-04-02

## 2025-03-30 RX ORDER — ACETAMINOPHEN 500 MG
500 TABLET ORAL EVERY 4 HOURS PRN
Status: DISCONTINUED | OUTPATIENT
Start: 2025-03-30 | End: 2025-04-02

## 2025-03-30 RX ORDER — NITROGLYCERIN 20 MG/100ML
INJECTION INTRAVENOUS
Status: DISPENSED
Start: 2025-03-30 | End: 2025-03-31

## 2025-03-30 RX ORDER — LIDOCAINE HYDROCHLORIDE 10 MG/ML
10 INJECTION, SOLUTION INFILTRATION; PERINEURAL ONCE
Status: COMPLETED | OUTPATIENT
Start: 2025-03-30 | End: 2025-03-30

## 2025-03-30 RX ORDER — MORPHINE SULFATE 2 MG/ML
1 INJECTION, SOLUTION INTRAMUSCULAR; INTRAVENOUS EVERY 2 HOUR PRN
Status: CANCELLED | OUTPATIENT
Start: 2025-03-30

## 2025-03-30 RX ORDER — EZETIMIBE 10 MG/1
10 TABLET ORAL DAILY
Status: DISCONTINUED | OUTPATIENT
Start: 2025-03-30 | End: 2025-04-02

## 2025-03-30 RX ORDER — MORPHINE SULFATE 4 MG/ML
4 INJECTION, SOLUTION INTRAMUSCULAR; INTRAVENOUS EVERY 2 HOUR PRN
Status: CANCELLED | OUTPATIENT
Start: 2025-03-30

## 2025-03-30 RX ORDER — BISACODYL 10 MG
10 SUPPOSITORY, RECTAL RECTAL
Status: DISCONTINUED | OUTPATIENT
Start: 2025-03-30 | End: 2025-04-02

## 2025-03-30 RX ORDER — SENNOSIDES 8.6 MG
17.2 TABLET ORAL NIGHTLY PRN
Status: DISCONTINUED | OUTPATIENT
Start: 2025-03-30 | End: 2025-03-30

## 2025-03-30 RX ORDER — PROCHLORPERAZINE EDISYLATE 5 MG/ML
5 INJECTION INTRAMUSCULAR; INTRAVENOUS EVERY 8 HOURS PRN
Status: CANCELLED | OUTPATIENT
Start: 2025-03-30

## 2025-03-30 RX ORDER — POLYETHYLENE GLYCOL 3350 17 G/17G
17 POWDER, FOR SOLUTION ORAL DAILY PRN
Status: DISCONTINUED | OUTPATIENT
Start: 2025-03-30 | End: 2025-04-02

## 2025-03-30 RX ORDER — PROCHLORPERAZINE EDISYLATE 5 MG/ML
5 INJECTION INTRAMUSCULAR; INTRAVENOUS EVERY 8 HOURS PRN
Status: DISCONTINUED | OUTPATIENT
Start: 2025-03-30 | End: 2025-04-02

## 2025-03-30 RX ORDER — CLONIDINE 0.3 MG/24H
1 PATCH, EXTENDED RELEASE TRANSDERMAL WEEKLY
Status: ON HOLD | COMMUNITY

## 2025-03-30 RX ORDER — SENNOSIDES 8.6 MG
17.2 TABLET ORAL NIGHTLY PRN
Status: DISCONTINUED | OUTPATIENT
Start: 2025-03-30 | End: 2025-04-02

## 2025-03-30 RX ORDER — HEPARIN SODIUM 5000 [USP'U]/ML
5000 INJECTION, SOLUTION INTRAVENOUS; SUBCUTANEOUS EVERY 12 HOURS SCHEDULED
Status: CANCELLED | OUTPATIENT
Start: 2025-03-30

## 2025-03-30 RX ORDER — ASPIRIN 81 MG/1
81 TABLET ORAL DAILY
Status: ON HOLD | COMMUNITY

## 2025-03-30 RX ORDER — POTASSIUM CHLORIDE 14.9 MG/ML
20 INJECTION INTRAVENOUS ONCE
Status: COMPLETED | OUTPATIENT
Start: 2025-03-30 | End: 2025-03-31

## 2025-03-30 RX ORDER — ONDANSETRON 2 MG/ML
4 INJECTION INTRAMUSCULAR; INTRAVENOUS EVERY 6 HOURS PRN
Status: CANCELLED | OUTPATIENT
Start: 2025-03-30

## 2025-03-30 RX ORDER — ACETAMINOPHEN 500 MG
1000 TABLET ORAL EVERY 6 HOURS PRN
Status: ON HOLD | COMMUNITY

## 2025-03-30 RX ORDER — MORPHINE SULFATE 2 MG/ML
2 INJECTION, SOLUTION INTRAMUSCULAR; INTRAVENOUS EVERY 2 HOUR PRN
Status: CANCELLED | OUTPATIENT
Start: 2025-03-30

## 2025-03-30 NOTE — CM/SW NOTE
Per ERMD, patient may possibly need transfer to [facility] ICU.       Spoke with Madiha RN Supervisor at [facility] and ICU beds are available.    [facility] on \"will call\".    PCS form completed in Epic.      145pm    Called [facility] and released Critical Care w/lights and sirens to come to ER for transfer to [facility].    ETA 20 mins ( 205pm)

## 2025-03-30 NOTE — TRANSFER CENTER NOTE
DIMPLE received a call from Dr. Holder requesting to transfer this patient to the [facility] ICU for a Type B dissection. DIMPLE spoke with Oma, Cecilia Marquis and a bed will be available.  DIMPLE paged [facility] Dr. ROSALES Atwood to call Dr. Holder.    @[phone] Dr. Atwood accepted the patient. ANNEMARIE Wilson CM aware and will take over the transfer.

## 2025-03-30 NOTE — CM/SW NOTE
Patient accepted for transfer to [facility].  Spoke with Madiha RN Supervisor and provided bed and RN # for dx Aortic dissection type A and B..    Accepting MD:   Dr. Holder - surgeon                            Dr. Boswell - Intensivist                Dr. Atwood - Hospitalist      Room 474 ICU    Receiving RN:  Itz AGEE [phone]    [facility] ETA 205pm    PCS form completed in Epic.

## 2025-03-30 NOTE — H&P
[facility]  part of [facility]    History & Physical    Alisha Wilde Patient Status:  Emergency    10/25/1965 MRN [id number]   Location [facility] EMERGENCY DEPARTMENT Attending Matheus Hayes MD   Hosp Day # 0 PCP Bridger Avendano MD     Date:  3/30/2025  Date of Admission:  3/30/2025    History provided by:patient  Chief Complaint:     Chief Complaint   Patient presents with    Chest Pain    Back Pain       HPI:   Alisha Wilde is a(n) 59 year old female with a PMH of Type A aortic dissection s/p repair on 24 who presents with back pain/chest pain that seems to radiate through her chest wall.  Pain is located midsternal.  No associated n/v.  No sob.  No fevers/chills.  No cough.  In the ED she had a ct scan of the chest that showed type B aortic dissection.  She will be admitted.    History     Past Medical History:    Chronic kidney disease (CKD)    Esophageal reflux    High blood pressure    High cholesterol    HYPERTENSION    Hypertension    Unspecified essential hypertension     Past Surgical History:   Procedure Laterality Date    Anesth,open heart surgery  2024    Colonoscopy N/A 2018    Procedure: COLONOSCOPY;  Surgeon: Magdy Webber MD;  Location: [facility] ENDOSCOPY    Endometrial ablation      child passed away for 5 mins          1    Other surgical history  2011    cysto-Dr. Lacey -- pt denies     Family History   Problem Relation Age of Onset    Hypertension Mother     Heart Disorder Mother 70    Other (Other) Mother         kidney failure    Hypertension Father     Hypertension Maternal Grandfather     Cancer Neg     Diabetes Neg     Glaucoma Neg     Macular degeneration Neg      Social History:  Social History     Socioeconomic History    Marital status: Single   Tobacco Use    Smoking status: Former     Current packs/day: 0.00     Average packs/day: 1 pack/day for 13.0 years (13.0 ttl pk-yrs)     Types: Cigarettes     Start date:       Quit date:      Years since quittin.2    Smokeless tobacco: Former   Vaping Use    Vaping status: Never Used   Substance and Sexual Activity    Alcohol use: Not Currently     Alcohol/week: 1.0 - 2.0 standard drink of alcohol     Types: 1 - 2 Cans of beer per week     Comment: every day  NOT DRINKING FOR LAS 4 MONTHS    Drug use: No     Social Drivers of Health     Food Insecurity: Unknown (2024)    Food Insecurity     Food Insecurity: Patient unable to answer   Transportation Needs: Unknown (2024)    Transportation Needs     Lack of Transportation: Patient unable to answer   Housing Stability: Unknown (2024)    Housing Stability     Housing Instability: Patient unable to answer     Allergies/Medications:   Allergies: Allergies[1]  (Not in a hospital admission)      Review of Systems:   Pertinent items are noted in HPI.  12 ROS completed and otherwise negative    Physical Exam:   Vital Signs:  Blood pressure 131/77, pulse 69, temperature 98.1 °F (36.7 °C), temperature source Oral, resp. rate 16, height 5' 5\" (1.651 m), weight 210 lb (95.3 kg), SpO2 95%, not currently breastfeeding.     Gen: No acute distress  Pulm: Lungs clear, normal respiratory effort  CV: Heart with regular rate and rhythm  Abd: Abdomen soft, nontender, nondistended, bowel sounds present  Neuro: No acute focal deficits  MSK: moves extremities  Skin: Warm and dry  Psych: Normal affect  Ext: no c/c/e      Cervical Papanicolaou to be done in MD's office    Results:     Lab Results   Component Value Date    WBC 7.9 2025    HGB 9.8 (L) 2025    HCT 32.1 (L) 2025    .0 2025    CREATSERUM 1.37 (H) 2025    BUN 16 2025     2025    K 4.1 2025     2025    CO2 22.0 2025     (H) 2025    CA 9.4 2025    ALB 4.7 2025    ALKPHO 53 2025    BILT 0.4 2025    TP 8.0 2025    AST 15 2025    ALT 9 (L)  02/03/2025    PTT 29.6 03/30/2025    INR 1.04 03/30/2025    TSH 2.085 12/07/2024    NATHALIE 99 11/25/2024    LIP 28 11/25/2024    DDIMER 0.42 12/08/2019    ESRML 15 06/05/2021    MG 2.1 12/22/2024    PHOS 3.8 03/21/2025    TROP <0.045 12/08/2019     (H) 09/23/2021    B12 1,156 (H) 07/23/2018       CTA CHEST+CTA ABDOMEN DISSECT SET (CPT=71275/34805)    Result Date: 3/30/2025  CONCLUSION:   Type A extending aortic dissection, status post aneurysm repair with ascending aortic stent graft.  Partially thrombosed ascending thoracic aortic aneurysm measures 4.5 cm, probably unchanged from 11/26/2024 noncontrast CT.  Correlate with more recent prior outside imaging to assess stability of this finding.  Progressed/new type B aortic dissection with dissection flap extending from the distal aortic arch to the bilateral common iliac arteries and the right external iliac artery to the edge of the field of view of this examination.  Associated areas of moderate and severe narrowing of the true lumen of the thoracic and abdominal aorta.  The left renal artery and celiac trunk arise from the false lumen.  Dissection flap extends to the left renal artery by approximately 9 mm. Recommend thoracic/vascular surgery consultation.  For reference, the proximal descending thoracic aorta measures 5.0 cm, previously 3.9 cm on 11/26/2024.    Findings were discussed with Dr Hayes  in the emergency room on 03/30/2025 at 9:27 a.m..   Dictated by (CST): Karen Ureña MD on 3/30/2025 at 9:13 AM     Finalized by (CST): Karen Ureña MD on 3/30/2025 at 9:32 AM         EKG 12 Lead    Result Date: 3/30/2025  Normal sinus rhythm Possible Left atrial enlargement Septal infarct , age undetermined Abnormal ECG When compared with ECG of 19-DEC-2024 04:42, Septal infarct is now Present Nonspecific T wave abnormality no longer evident in Inferior leads Nonspecific T wave abnormality, improved in Lateral leads     Assessment/Plan:       Type B aortic  dissection  - admit to ICU  - start IV esmolol and nitroglycerin  - vascular surgery consulted    Type A aortic dissection  - s/p repair on 11/21/24  - post op resp failure, NAIMA, s/p trach and peg now removed    Other PMH  HTN  HL  CKD stage 3  TANYA   Obesity - BMI 34  Asthma    Supplementary Documentation:   DVT Mechanical Prophylaxis:        DVT Pharmacologic Prophylaxis   Medication   None                Code Status: Full Code  Webb: No urinary catheter in place  Webb Duration (in days):   Central line:    BRANDON:                            Lizeth Buck MD  3/30/2025        Greater than 75 minutes spent in preparation of this admission in examining patient, obtaining history, reviewing records and d/w patient/family/consultants.         [1]   Allergies  Allergen Reactions    Atorvastatin MYALGIA    Pravastatin MYALGIA    Rosuvastatin MYALGIA    Seasonal Runny nose

## 2025-03-30 NOTE — BRIEF OP NOTE
Procedure: Right radial A-line placement    Indication: Type B dissection hypertensive emergency    Procedure: The right radial artery was prepped and draped in sterile fashion.  After identification of the radial artery using ultrasound guidance an introducer needle was inserted into the right radial artery.  A guidewire was then advanced and a 20-gauge Angiocath was inserted over the guidewire into the radial artery with good blood return.  This was connected to a transducer.  Sterile dressing was applied.  The arterial line was wrapped from the patient's thumb in good condition with good collateral flow through the ulnar artery.    Patient tolerated procedure well with no immediate complications.    Date of service is March 30, 2025

## 2025-03-30 NOTE — CONSULTS
[facility]  part of [facility]    Report of Consultation    Alisha Wilde Patient Status:  Emergency    10/25/1965 MRN [id number]   Location [facility] EMERGENCY DEPARTMENT Attending Matheus Hayes MD   Hosp Day # 0 PCP Bridger Avendano MD     Date of Admission:  3/30/2025  Date of Consult: 3/30/2025     Reason for Consultation:   Consults  Type B aortic dissection    History provided by:patient  HPI:     Chief Complaint   Patient presents with    Chest Pain    Back Pain     HPI    59-year-old female with history of type a aortic dissection s/p repair on 2024 with complicated course with respiratory failure and renal failure required prolonged intubation and then trach and PEG and subsequently did well in rehab and she was decannulated and also PEG tube was removed  TANYA, HTN, HL, CKD , obesity with a BMI of 35, GERD    Patient presented today with back pain started couple days ago and presented to ER with this complaint and blood pressure was on the high side and CT showed type B aortic dissection    No chest pain or dyspnea or cough  No fever or chills  Comfortable on room air  No abdominal pain or vomiting or diarrhea  Denied any nausea or vomiting or diarrhea  No lower extremity edema  No focal weakness or numbness      History     Past Medical History:    Chronic kidney disease (CKD)    Esophageal reflux    High blood pressure    High cholesterol    HYPERTENSION    Hypertension    Unspecified essential hypertension     Past Surgical History:   Procedure Laterality Date    Anesth,open heart surgery  2024    Colonoscopy N/A 2018    Procedure: COLONOSCOPY;  Surgeon: Magdy Webber MD;  Location: [facility] ENDOSCOPY    Endometrial ablation      child passed away for 5 mins          1    Other surgical history  2011    cysto-Dr. Lacey -- pt denies     Family History   Problem Relation Age of Onset    Hypertension Mother     Heart Disorder Mother 70     Other (Other) Mother         kidney failure    Hypertension Father     Hypertension Maternal Grandfather     Cancer Neg     Diabetes Neg     Glaucoma Neg     Macular degeneration Neg      Social History:  Social History     Socioeconomic History    Marital status: Single   Tobacco Use    Smoking status: Former     Current packs/day: 0.00     Average packs/day: 1 pack/day for 13.0 years (13.0 ttl pk-yrs)     Types: Cigarettes     Start date:      Quit date:      Years since quittin.2    Smokeless tobacco: Former   Vaping Use    Vaping status: Never Used   Substance and Sexual Activity    Alcohol use: Not Currently     Alcohol/week: 1.0 - 2.0 standard drink of alcohol     Types: 1 - 2 Cans of beer per week     Comment: every day  NOT DRINKING FOR LAS 4 MONTHS    Drug use: No     Social Drivers of Health     Food Insecurity: Unknown (2024)    Food Insecurity     Food Insecurity: Patient unable to answer   Transportation Needs: Unknown (2024)    Transportation Needs     Lack of Transportation: Patient unable to answer   Housing Stability: Unknown (2024)    Housing Stability     Housing Instability: Patient unable to answer     Allergies/Medications:   Allergies: Allergies[1]  (Not in a hospital admission)      Review of Systems:     Constitutional:  Negative for fever.   HENT:  Negative for congestion.    Respiratory:  Negative for cough, shortness of breath and wheezing.    Cardiovascular: Negative.    Gastrointestinal: Negative.    Musculoskeletal:  Positive for back pain.   Skin: Negative.    Neurological: Negative.    Hematological: Negative.    Psychiatric/Behavioral: Negative.         Physical Exam:   Vital Signs:   height is 5' 5\" (1.651 m) and weight is 210 lb (95.3 kg). Her oral temperature is 98.1 °F (36.7 °C). Her blood pressure is 119/63 and her pulse is 78. Her respiration is 15 and oxygen saturation is 99%.   Physical Exam  Constitutional:       General: She is not in acute  distress.     Appearance: She is obese.   HENT:      Head: Atraumatic.      Nose: Nose normal.      Mouth/Throat:      Mouth: Mucous membranes are moist.   Eyes:      General: No scleral icterus.  Cardiovascular:      Rate and Rhythm: Normal rate.      Heart sounds: Murmur heard.      No gallop.   Pulmonary:      Effort: No respiratory distress.      Breath sounds: No stridor. No wheezing, rhonchi or rales.   Chest:      Chest wall: No tenderness.   Abdominal:      General: Abdomen is flat. Bowel sounds are normal. There is no distension.      Palpations: Abdomen is soft.      Tenderness: There is no guarding.   Musculoskeletal:      Cervical back: Normal range of motion. No rigidity.      Right lower leg: No edema.      Left lower leg: No edema.   Skin:     General: Skin is dry.   Neurological:      General: No focal deficit present.      Mental Status: She is oriented to person, place, and time.         Results:     Lab Results   Component Value Date    WBC 7.9 03/30/2025    HGB 9.8 (L) 03/30/2025    HCT 32.1 (L) 03/30/2025    .0 03/30/2025    CREATSERUM 1.37 (H) 03/30/2025    BUN 16 03/30/2025     03/30/2025    K 4.1 03/30/2025     03/30/2025    CO2 22.0 03/30/2025     (H) 03/30/2025    CA 9.4 03/30/2025    ALB 4.7 03/21/2025    ALKPHO 53 02/03/2025    BILT 0.4 02/03/2025    TP 8.0 02/03/2025    AST 15 02/03/2025    ALT 9 (L) 02/03/2025    PTT 30.7 12/04/2024    INR 1.17 12/04/2024    TSH 2.085 12/07/2024    NATHALIE 99 11/25/2024    LIP 28 11/25/2024    DDIMER 0.42 12/08/2019    ESRML 15 06/05/2021    MG 2.1 12/22/2024    PHOS 3.8 03/21/2025    TROP <0.045 12/08/2019    TROPHS 8 03/30/2025     (H) 09/23/2021    B12 1,156 (H) 07/23/2018     CTA CHEST+CTA ABDOMEN DISSECT SET (CPT=71275/53478)    Result Date: 3/30/2025  CONCLUSION:   Type A extending aortic dissection, status post aneurysm repair with ascending aortic stent graft.  Partially thrombosed ascending thoracic aortic aneurysm  measures 4.5 cm, probably unchanged from 11/26/2024 noncontrast CT.  Correlate with more recent prior outside imaging to assess stability of this finding.  Progressed/new type B aortic dissection with dissection flap extending from the distal aortic arch to the bilateral common iliac arteries and the right external iliac artery to the edge of the field of view of this examination.  Associated areas of moderate and severe narrowing of the true lumen of the thoracic and abdominal aorta.  The left renal artery and celiac trunk arise from the false lumen.  Dissection flap extends to the left renal artery by approximately 9 mm. Recommend thoracic/vascular surgery consultation.  For reference, the proximal descending thoracic aorta measures 5.0 cm, previously 3.9 cm on 11/26/2024.    Findings were discussed with Dr Hayes  in the emergency room on 03/30/2025 at 9:27 a.m..   Dictated by (CST): Karen Ureña MD on 3/30/2025 at 9:13 AM     Finalized by (CST): Karen Ureña MD on 3/30/2025 at 9:32 AM         EKG 12 Lead    Result Date: 3/30/2025  Normal sinus rhythm Possible Left atrial enlargement Septal infarct , age undetermined Abnormal ECG When compared with ECG of 19-DEC-2024 04:42, Septal infarct is now Present Nonspecific T wave abnormality no longer evident in Inferior leads Nonspecific T wave abnormality, improved in Lateral leads     Impression:       1-type B aortic dissection   with dissection flap extends to distal aortic arch and bilateral common iliac arteries , associated area of moderate to severe narrowing of the true lumen.    Plan :  Admit patient to ICU  Strict control of blood pressure with IV esmolol  Vascular surgery consultation    2- h/o type a aortic dissection s/p repair on 11/21/2024   Complicated course with respiratory failure and renal failure with prolonged intubation required trach and PEG open patient subsequently improved and decannulated and PEG tube was removed    3-other  comorbidity  ( obesity , TANYA , asthma , GERD , HTN , HL , CKD )    4- DVT prophylaxis   Heparin subcutaneous       Complex , high risk           Rg Wilkerson MD  3/30/2025         [1]   Allergies  Allergen Reactions    Atorvastatin MYALGIA    Pravastatin MYALGIA    Rosuvastatin MYALGIA    Seasonal Runny nose

## 2025-03-30 NOTE — ED QUICK NOTES
Orders for admission, patient is aware of plan and ready to go upstairs. Any questions, please call ED RN elie at extension [phone].     Patient Covid vaccination status: Fully vaccinated     COVID Test Ordered in ED: None    COVID Suspicion at Admission: N/A    Running Infusions:    nitroGLYCERIN in dextrose 5% 50 mcg/min (03/30/25 1353)    esmolol 210 mcg/kg/min (03/30/25 1354)        Mental Status/LOC at time of transport: AAOx4    Other pertinent information: NPO in ED  CIWA score: N/A   NIH score:  N/A

## 2025-03-30 NOTE — ED QUICK NOTES
Vascular at bedside - Dr Fraser  Pt NPO in ED  Doppler pulses noted to BLE  Pt denies pain at this time

## 2025-03-30 NOTE — ED INITIAL ASSESSMENT (HPI)
Pt came in for intermittent right sided chest pain radiating to the back going on for a month.  Per pt she came in today because pain to the back is getting worse.  History of  Open heart surgery hypertension , Esophageal reflux , CKD .  Per pt she has shortness of breath.  Pt is A/Ox 4, breathing unlabored.

## 2025-03-30 NOTE — H&P
[facility]     Alisha Wilde Patient Status:  Inpatient    10/25/1965 MRN [id number]   Location [facility] 4SW-A Attending Wilman Atwood, *   Hosp Day # 0 PCP Bridger Avendano MD     Chief Complaint: back pain, chest pain    Subjective:    History of Present Illness:     Alisha Wilde is a 59 year old female with PMhx of GERD< HTN, DL, aortic dissection presents with chest pain and back pain. Pt was initially seen at [facility] and transferred to [facility] as she was found to have type B aortic dissection. She currently denies any CP. She is breathing ok. No F/C. No N/V/D/C or abd pain. She had her repair of her Type A aortic dissection on 24    History/Other:    Past Medical History:  Past Medical History:    Chronic kidney disease (CKD)    Esophageal reflux    High blood pressure    High cholesterol    HYPERTENSION    Hypertension    Unspecified essential hypertension     Past Surgical History:   Past Surgical History:   Procedure Laterality Date    Anesth,open heart surgery  2024    Colonoscopy N/A 2018    Procedure: COLONOSCOPY;  Surgeon: Magdy Webber MD;  Location: [facility] ENDOSCOPY    Endometrial ablation      child passed away for 5 mins          1    Other surgical history  2011    cysto-Dr. Lacey -- pt denies      Family History:   Family History   Problem Relation Age of Onset    Hypertension Mother     Heart Disorder Mother 70    Other (Other) Mother         kidney failure    Hypertension Father     Hypertension Maternal Grandfather     Cancer Neg     Diabetes Neg     Glaucoma Neg     Macular degeneration Neg      Social History:    reports that she quit smoking about 26 years ago. Her smoking use included cigarettes. She started smoking about 39 years ago. She has a 13 pack-year smoking history. She has quit using smokeless tobacco. She reports that she does not currently use alcohol after a past usage of about 1.0 - 2.0 standard  drink of alcohol per week. She reports that she does not use drugs.     Allergies: Allergies[1]    Medications:  Medications Ordered Prior to Encounter[2]    Review of Systems:   A comprehensive review of systems was completed.    Pertinent positives and negatives noted in the HPI.    Objective:   Physical Exam:    /75 (BP Location: Left arm)   Pulse 68   Temp 97.7 °F (36.5 °C) (Temporal)   Resp 15   Wt 222 lb 3.6 oz (100.8 kg)   SpO2 97%   BMI 36.98 kg/m²   General: No acute distress, Alert  Respiratory: No rhonchi, no wheezes  Cardiovascular: S1, S2. Regular rate and rhythm  Abdomen: Soft, Non-tender, non-distended, positive bowel sounds  Neuro: No new focal deficits  Extremities: No edema      Results:    Labs:      Labs Last 24 Hours:    Recent Labs   Lab 03/30/25  0757 03/30/25  1632   RBC 4.04 4.02   HGB 9.8* 10.0*   HCT 32.1* 31.5*   MCV 79.5* 78.4*   MCH 24.3* 24.9*   MCHC 30.5* 31.7   RDW 15.9* 15.9   NEPRELIM 5.36 4.26   WBC 7.9 6.4   .0 268.0       Recent Labs   Lab 03/30/25  0757 03/30/25  1631   * 84   BUN 16 12   CREATSERUM 1.37* 0.92   EGFRCR 44* 71   CA 9.4 7.4*   ALB  --  3.3    145   K 4.1 3.2*    116*   CO2 22.0 18.0*   ALKPHO  --  46*   AST  --  <8   ALT  --  <7*   BILT  --  0.2   TP  --  5.6*       Estimated Glomerular Filtration Rate: 71 mL/min/1.73m2 (result from lab).    Lab Results   Component Value Date    INR 1.14 03/30/2025    INR 1.04 03/30/2025    INR 1.17 12/04/2024       Recent Labs   Lab 03/30/25  0757   TROPHS 8       No results for input(s): \"TROP\", \"PBNP\" in the last 168 hours.    No results for input(s): \"PCT\" in the last 168 hours.    Imaging: Imaging data reviewed in Epic.    Assessment & Plan:      #Type B aortic Dissection  #L renal artery dissection  Monitor in ICU  BP control  Continue cardene and esmolol gtt  Vascular surgery following  Awaiting decision on lumbar drain placement  Goal SBP <130  Awaiting decision on CTA carotid/brain  vs MRI    #Prior hx of Type A aortic dissection  S/p repair 11/2024  Post op complications with rep failure and NAIMA needing trach and peg that are now removed    #Ess HTN  Resume meds  BP control    #Dyslipidemia  zetia    #CKD3    #TANYA    #Obesity, BMI 36    A total of 35 minutes of critical care time (exclusive of billable procedures) was administered. This involved direct patient intervention, complex decision making, and/or extensive discussions (>50% face to face time) with the patient, family, and clinical staff.           Plan of care discussed with patient, ICU    Sumit Kapoor MD    Supplementary Documentation:     The 21st Century Cures Act makes medical notes like these available to patients in the interest of transparency. Please be advised this is a medical document. Medical documents are intended to carry relevant information, facts as evident, and the clinical opinion of the practitioner. The medical note is intended as peer to peer communication and may appear blunt or direct. It is written in medical language and may contain abbreviations or verbiage that are unfamiliar.                                       [1]   Allergies  Allergen Reactions    Atorvastatin MYALGIA    Pravastatin MYALGIA    Rosuvastatin MYALGIA    Seasonal Runny nose   [2]   Current Facility-Administered Medications on File Prior to Encounter   Medication Dose Route Frequency Provider Last Rate Last Admin    [COMPLETED] iopamidol 76% (ISOVUE-370) injection for power injector  80 mL Intravenous ONCE PRN Matheus Hayes MD   80 mL at 03/30/25 0833     Current Outpatient Medications on File Prior to Encounter   Medication Sig Dispense Refill    cloNIDine 0.3 MG/24HR Transdermal Patch Weekly Place 1 patch onto the skin once a week. Pt reports applying on Saturdays      acetaminophen 500 MG Oral Tab Take 2 tablets (1,000 mg total) by mouth every 6 (six) hours as needed for Pain.      aspirin 81 MG Oral Tab EC Take 1 tablet (81 mg  total) by mouth daily.      thiamine 100 MG Oral Tab Take 1 tablet (100 mg total) by mouth daily.      ezetimibe 10 MG Oral Tab Take 1 tablet (10 mg total) by mouth daily.      hydrALAZINE 50 MG Oral Tab Take 1 tablet (50 mg total) by mouth 3 (three) times daily. 270 tablet 1    pantoprazole 40 MG Oral Tab EC Take 1 tablet (40 mg total) by mouth every morning before breakfast. 90 tablet 3    bisacodyl 5 MG Oral Tab EC Take 1 tablet (5 mg total) by mouth daily as needed. 30 tablet 0    docusate sodium (COLACE) 100 MG Oral Cap Take 1 capsule (100 mg total) by mouth 2 (two) times daily as needed for constipation. 180 capsule 1    Ferrous Gluconate 324 (37.5 Fe) MG Oral Tab Take 1 tablet (324 mg total) by mouth daily. 90 tablet 1    carvedilol 25 MG Oral Tab Take 1.5 tablets (37.5 mg total) by mouth daily.      azelastine 0.1 % Nasal Solution 1 spray by Nasal route 2 (two) times daily. (Patient taking differently: 1 spray by Nasal route daily as needed for Rhinitis.) 3 each 1    nystatin 481089 UNIT/GM External Powder Apply 1 Application topically 3 (three) times daily. 60 g 2    mirtazapine 7.5 MG Oral Tab 1 tablet (7.5 mg total) by Per G Tube route nightly. (Patient taking differently: 1 tablet (7.5 mg total) by Per G Tube route as needed.) 30 tablet 0    amLODIPine 10 MG Oral Tab Take 1 tablet (10 mg total) by mouth daily. 90 tablet 3

## 2025-03-30 NOTE — CONSULTS
Vascular Surgery Consultation    Alisha Wilde Patient Status:  Emergency    10/25/1965 MRN [id number]   Location [facility] EMERGENCY DEPARTMENT Attending Matheus Hayes MD   Hosp Day # 0 PCP Bridger Avendano MD     Reason for Consultation:  History of type a dissection repair with extensive type B dissection with aneurysmal degeneration    History of Present Illness:  Alisha Wilde is a a(n) 59 year old female who presents to the emergency room with significant back pain.  She has significant history of a extensive aortic dissection in November that required a ascending repair.  She had a prolonged hospital course with prolonged intubation that required a trach and PEG tube and a brief course of dialysis.  She has since recovered and is decannulated and eating regularly.  She is no longer requiring dialysis.  She does have baseline left leg weakness since her ascending repair.  She now requires use of a cane.   She checks her blood pressure at home and reports that her blood pressure was 120 systolic yesterday but sometimes it gets as high as 150s systolic.   She works for [employer] but has not returned to work yet.   In the emergency room today she had a CTA chest abdomen with incomplete viewing of the pelvis that demonstrated significant aneurysmal degeneration of her previous dissection with possible false lumen thrombosis in the thoracic aorta.         History:  Past Medical History:    Chronic kidney disease (CKD)    Esophageal reflux    High blood pressure    High cholesterol    HYPERTENSION    Hypertension    Unspecified essential hypertension     Past Surgical History:   Procedure Laterality Date    Anesth,open heart surgery  2024    Colonoscopy N/A 2018    Procedure: COLONOSCOPY;  Surgeon: Magdy Webber MD;  Location: [facility] ENDOSCOPY    Endometrial ablation      child passed away for 5 mins          1    Other surgical history  2011    cysto-Dr. Lacey -- pt  denies     Family History   Problem Relation Age of Onset    Hypertension Mother     Heart Disorder Mother 70    Other (Other) Mother         kidney failure    Hypertension Father     Hypertension Maternal Grandfather     Cancer Neg     Diabetes Neg     Glaucoma Neg     Macular degeneration Neg       reports that she quit smoking about 26 years ago. Her smoking use included cigarettes. She started smoking about 39 years ago. She has a 13 pack-year smoking history. She has quit using smokeless tobacco. She reports that she does not currently use alcohol after a past usage of about 1.0 - 2.0 standard drink of alcohol per week. She reports that she does not use drugs.    Allergies:  Allergies[1]    Medications:    Current Facility-Administered Medications:     morphINE PF 4 MG/ML injection 4 mg, 4 mg, Intravenous, Once    nitroGLYCERIN in dextrose 5% 50 mg/250mL infusion premix, 5-400 mcg/min, Intravenous, Continuous    esmolol (Brevibloc) 2000 mg/100mL infusion premix,  mcg/kg/min, Intravenous, Continuous    Review of Systems:    CONSTITUTIONAL: denies fever, chills  ENT: denies sore throat, nasal drainage/congestion  CHEST/CVS: denies cough, sputum, trouble breathing/SOB, chest pain, MILLER  GI: denies abdominal pain, heartburn, diarrhea, blood in bm, tarry bm, constipation,    : denies difficulty urinating, pain, blood in urine, or frequency  SKIN: denies any unusual skin lesions or rashes  MUSCULOSKELETAL: denies back pain, joint pain, leg swelling  NEURO/EYES: denies headaches, passing out, motor dysfunction, difficulty walking, difficulty with speech, temporary blindness, double vision, confusion    Physical Exam:  /69   Pulse 68   Temp 98.1 °F (36.7 °C) (Oral)   Resp 15   Ht 5' 5\" (1.651 m)   Wt 210 lb (95.3 kg)   SpO2 95%   BMI 34.95 kg/m²   GENERAL: alert and orientated X 3, well developed, well nourished, in no apparent distress  HEENT: ears and throat are clear  NECK: supple, no  lymphadenopathy, thyroid wnl  CAROTID: No bruits  RESPIRATORY: no rales, rhonchi, or wheezes B  CARDIO: RRR without murmur, no murmur, no gallop   ABDOMEN: soft, non-tender with no palpable aneurysm or masses  BACK: normal, no tenderness  SKIN: no rashes, no suspicious lesions, warm and dry  EXTREMITIES: no edema, full range of motion, no tenderness  NEURO/PSYCH: orientated x3, normal mood and affect, no sensory or motor deficit    ARTERIAL VASCULAR EXAM    LOWER EXTREMITY     FEMORAL POPLITEAL POST TIB ANT TIB PERONEAL   RIGHT 3 2 doppler 2         LEFT 1 doppler doppler doppler doppler       Strength appears equivocal when patient is in bed in both lower extremities    Laboratory Data:  Lab Results   Component Value Date    WBC 7.9 03/30/2025    HGB 9.8 03/30/2025    HCT 32.1 03/30/2025    .0 03/30/2025    CREATSERUM 1.37 03/30/2025    BUN 16 03/30/2025     03/30/2025    K 4.1 03/30/2025     03/30/2025    CO2 22.0 03/30/2025     03/30/2025    CA 9.4 03/30/2025    PTT 29.6 03/30/2025    INR 1.04 03/30/2025    PTP 14.2 03/30/2025       Impression and Plan:    Aneurysmal degeneration of type B dissection-in reviewing her CT scan from 1121 to the CT scan performed today there is significant anginal degeneration of the dissection.  There is also worsening dissection involving the left renal artery.  I am also concerned that the dissection may extend all the way into her left femoral artery or the external iliac.  There is no imaging since November to adequately evaluate this and she has had significant weakness in the left leg since her surgery.   Recommend continued impulse control.   Given the need for repair and the risk of spinal cord ischemia will transfer patient to [facility] for possible preemptive lumbar drain placement prior to repair.   Continue impulse control with blood pressure goal less than 130 systolic.   Patient will need additional imaging this admission prior to repair.  She  will need a CTA chest abdomen and pelvis to adequately visualize the femoral arteries as I am concerned the left femoral artery may be chronically occluded or be perfusing only through the false lumen.  She will also need a CTA carotid and brain or MRI to evaluate to make sure she has no previous stroke if the femoral artery is patent on the follow-up CTA.   Discussed case with Dr. Mensah in ER and Dr. Boswell and Dr. Atwood at [facility]. Will notify [facility] cardiology after arrives at [facility].      Thank you for allowing me to participate in the care of your patient.    Frank Holder MD  3/30/2025  1:45 PM         [1]   Allergies  Allergen Reactions    Atorvastatin MYALGIA    Pravastatin MYALGIA    Rosuvastatin MYALGIA    Seasonal Runny nose

## 2025-03-30 NOTE — ED PROVIDER NOTES
Patient Seen in: [facility] Emergency Department    History     Chief Complaint   Patient presents with    Chest Pain    Back Pain     Stated Complaint: CP, back pain     HPI    59-year-old female with past medical history of hypertension, dyslipidemia, type aortic dissection status post graft repair in November presenting for evaluation with several weeks of intermittent right-sided chest and subscapular chest discomfort described as \"a knot\" without exacerbating relieving factors.  No exertional pleuritic complaints, no shortness of breath.  Also had a complaints of intermittent left leg discomfort since hospital discharge without any complaints; no focal weakness or paresthesias, no lower back, no abdominal pain.    Past Medical History:    Chronic kidney disease (CKD)    Esophageal reflux    High blood pressure    High cholesterol    HYPERTENSION    Hypertension    Unspecified essential hypertension       Past Surgical History:   Procedure Laterality Date    Anesth,open heart surgery  2024    Colonoscopy N/A 2018    Procedure: COLONOSCOPY;  Surgeon: Magdy Webber MD;  Location: [facility] ENDOSCOPY    Endometrial ablation      child passed away for 5 mins          1    Other surgical history  2011    cysto-Dr. Lacey -- pt denies            Family History   Problem Relation Age of Onset    Hypertension Mother     Heart Disorder Mother 70    Other (Other) Mother         kidney failure    Hypertension Father     Hypertension Maternal Grandfather     Cancer Neg     Diabetes Neg     Glaucoma Neg     Macular degeneration Neg        Social History     Socioeconomic History    Marital status: Single   Tobacco Use    Smoking status: Former     Current packs/day: 0.00     Average packs/day: 1 pack/day for 13.0 years (13.0 ttl pk-yrs)     Types: Cigarettes     Start date:      Quit date:      Years since quittin.2    Smokeless tobacco: Former   Vaping Use    Vaping status:  Never Used   Substance and Sexual Activity    Alcohol use: Not Currently     Alcohol/week: 1.0 - 2.0 standard drink of alcohol     Types: 1 - 2 Cans of beer per week     Comment: every day  NOT DRINKING FOR LAS 4 MONTHS    Drug use: No     Social Drivers of Health     Food Insecurity: Unknown (11/22/2024)    Food Insecurity     Food Insecurity: Patient unable to answer   Transportation Needs: Unknown (11/22/2024)    Transportation Needs     Lack of Transportation: Patient unable to answer   Housing Stability: Unknown (11/22/2024)    Housing Stability     Housing Instability: Patient unable to answer       Review of Systems :  Constitutional: As per HPI  Respiratory: Negative for cough and shortness of breath.    Cardiovascular: (+) Chest pain.  Gastrointestinal: Positive for back pain.  Genitourinary: Negative for dysuria and hematuria.     Positive for stated complaint: CP, back pain  Other systems are as noted in HPI.  Constitutional and vital signs reviewed.      All other systems reviewed and negative except as noted above.    PSFH elements reviewed from today and agreed except as otherwise stated in HPI.    Physical Exam     ED Triage Vitals [03/30/25 0725]   BP (!) 164/80   Pulse 78   Resp 20   Temp 98.1 °F (36.7 °C)   Temp src Oral   SpO2 98 %   O2 Device None (Room air)       Current:BP (!) 164/80   Pulse 78   Temp 98.1 °F (36.7 °C) (Oral)   Resp 20   Ht 165.1 cm (5' 5\")   Wt 95.3 kg   SpO2 98%   BMI 34.95 kg/m²         Physical Exam   Constitutional: No distress.  Nontoxic, well-appearing, pleasantly conversational.  HEENT: MM.  Head: Normocephalic.   Eyes: No injection.   Cardiovascular: RRR.  Upper extremities with 2+ radial pulses.  Lower extremities with intact DP/PT pulses.  Pulmonary/Chest: Effort normal. CTAB.  Abdominal: Soft.  Nontender.  Musculoskeletal: No gross deformity.  No midline thoracolumbar tenderness/step-off/deformity.  Neurological: Alert.  Cranial nerves II to XII grossly  intact.  Bilateral upper and lower extremities with 5/5 strength proximally and distally.  Skin: Skin is warm.   Psychiatric: Cooperative.  Nursing note and vitals reviewed.        ED Course     Labs Reviewed   CBC WITH DIFFERENTIAL WITH PLATELET - Abnormal; Notable for the following components:       Result Value    HGB 9.8 (*)     HCT 32.1 (*)     MCV 79.5 (*)     MCH 24.3 (*)     MCHC 30.5 (*)     RDW 15.9 (*)     All other components within normal limits   BASIC METABOLIC PANEL (8) - Abnormal; Notable for the following components:    Glucose 136 (*)     Creatinine 1.37 (*)     eGFR-Cr 44 (*)     All other components within normal limits   POCT GLUCOSE - Abnormal; Notable for the following components:    POC Glucose  119 (*)     All other components within normal limits   TROPONIN I HIGH SENSITIVITY - Normal   PTT, ACTIVATED - Normal   PROTHROMBIN TIME (PT) - Normal   TYPE AND SCREEN    Narrative:     The following orders were created for panel order Type and screen.  Procedure                               Abnormality         Status                     ---------                               -----------         ------                     ABORH (Blood Type)[[id number]]                               Final result               Antibody Screen[[id number]]                                  Final result                 Please view results for these tests on the individual orders.   ABORH (BLOOD TYPE)   ANTIBODY SCREEN   PREPARE RBC   RAINBOW DRAW LAVENDER   RAINBOW DRAW LIGHT GREEN   RAINBOW DRAW BLUE     EKG    Rate, intervals and axes as noted on EKG Report.  Rate: 80  Rhythm: Sinus Rhythm  Reading: NSR 80 without ST elevation as independently interpreted by myself           CTA CHEST+CTA ABDOMEN DISSECT SET (CPT=71275/75229)    Result Date: 3/30/2025  PROCEDURE: CT CHEST ABDOMEN DISSECT SET (CPT=71275/96430)  COMPARISON: [facility], CT CHEST ABDOMEN PELVIS (CPT=71250/59974), 11/26/2024, 12:26 PM.   [facility], CT ABDOMEN+PELVIS (CPT=74176), 12/10/2024, 5:06 PM.  [facility], CT ABDOMEN+PELVIS (CPT=74176), 12/09/2024, 5:51 PM.  INDICATIONS: CP, back pain  TECHNIQUE:   CT images of the chest and abdomen were obtained with intravenous contrast material.  Automated exposure control for dose reduction was used. Adjustment of the mA and/or kV was done based on the patient's size. Use of iterative reconstruction technique for dose reduction was used.  Dose information is transmitted to the ACR (American College of Radiology) NRDR (National Radiology Data Registry) which includes the Dose Index Registry.  FINDINGS:   VASCULATURE:  There is a type a aortic dissection, status post repair, with curvilinear hyperdense grafting material surrounding the ascending aorta, to the level of the great vessels.  There is a 2 vessel configuration of the aortic arch, which is a normal anatomic variant.  Mildly limited assessment of the ascending thoracic aorta related to motion artifact.  Aneurysmal dilation of the ascending thoracic aorta which is partially thrombosed measures 4.5 cm.  This previously appears to have measured 4.6 cm on comparison noncontrast CT chest from 11/26/2024.  Aneurysmal dilation of the distal aortic arch measures 4.3 cm, previously 3.0 cm on 11/26/2024. Proximal descending thoracic aorta measures 5.0 cm, previously 3.9 cm. Limited comparison to 11/26/2024 imaging due to noncontrast technique.  The false lumen of the descending thoracic aorta at dissection results in moderate narrowing of the lumen of the distal aortic arch and severe narrowing of the descending thoracic aorta, with a minimal luminal caliber of the descending thoracic aorta measuring 8 mm (4/9).  Celiac artery arises from the false lumen.  SMA arises from the true lumen.  Right renal artery arises from the true lumen.  Left renal artery arises from the false lumen, with mild extension of the dissection flap  into the most proximal aspect of the left renal artery, over less than 1.0 cm length.  The dissection extends into the bilateral common iliac arteries.  The dissection flap extends into the right internal iliac artery to the edge of the field of view (series 4, image 226).   Chest: LOWER NECK: Within normal limits.  CARDIAC: The heart is mildly enlarged in size.  There is no pericardial thickening.  There is no calcific atherosclerosis of coronary arteries.   MEDIASTINUM/HAMLET:  Nonspecific mediastinal lymph nodes measuring up to 1.0 cm short axis at the precarinal level (4/51).  CHEST WALL: No axillary mass or enlarged adenopathy.   LUNGS/ PLEURA: The trachea and central bronchi are patent.  Mild dependent opacities at the lung bases, thought to represent subsegmental atelectasis.  BONES:   There are flowing ventral osteophytes at multiple consecutive levels, compatible with diffuse idiopathic skeletal hyperostosis (DISH).  Midline sternotomy wires and closure devices.    Abdomen:    LIVER:  The liver is diffusely hypoattenuating, compatible with diffuse hepatic steatosis. Subcentimeter low-density foci are too small to accurately characterize.  GALLBLADDER: Present  BILIARY: No ductal dilation.  SPLEEN: Not enlarged.  PANCREAS: Unremarkable  ADRENALS: Unremarkable.  KIDNEYS: No hydronephrosis. No obstructing calculi.  Multiple bilateral fluid density renal cysts, similar to prior.   BOWEL:   No obstruction. Portions of the bowel are under distended and not well assessed.  Colonic diverticula.  Large stool throughout the colon.  AORTA/VASCULAR:   Under above vasculature section  RETROPERITONEUM/ MESENTERY:  No enlarged adenopathy. No free fluid or free air.    BONES:   No suspicious osseous finding.  There is multilevel degenerative disease of the spine.  ABDOMINAL WALL: No suspicious soft tissue finding.           CONCLUSION:   Type A extending aortic dissection, status post aneurysm repair with ascending aortic  stent graft.  Partially thrombosed ascending thoracic aortic aneurysm measures 4.5 cm, probably unchanged from 11/26/2024 noncontrast CT.  Correlate with more recent prior outside imaging to assess stability of this finding.  Progressed/new type B aortic dissection with dissection flap extending from the distal aortic arch to the bilateral common iliac arteries and the right external iliac artery to the edge of the field of view of this examination.  Associated areas of moderate and severe narrowing of the true lumen of the thoracic and abdominal aorta.  The left renal artery and celiac trunk arise from the false lumen.  Dissection flap extends to the left renal artery by approximately 9 mm. Recommend thoracic/vascular surgery consultation.  For reference, the proximal descending thoracic aorta measures 5.0 cm, previously 3.9 cm on 11/26/2024.    Findings were discussed with Dr Hayes  in the emergency room on 03/30/2025 at 9:27 a.m..   Dictated by (CST): Karen Ureña MD on 3/30/2025 at 9:13 AM     Finalized by (CST): Karen Ureña MD on 3/30/2025 at 9:32 AM           ED Course as of 03/30/25 1720  ------------------------------------------------------------  Time: 03/30 0830  Comment: Patient to CT, await results.  ------------------------------------------------------------  Time: 03/30 0942  Comment: CT as noted with esmolol/ntg to be initiated in patietn resting comfortable with NVI lower extremities.  ------------------------------------------------------------  Time: 03/30 1153  Comment: Awaiting ICU placement, case already d/w hospitalist Dr. Kessler, intensivist Dr. Wilkerson, CT surgery Dr. Gregg, vascular ; exam unchanged in patient without pain.  ------------------------------------------------------------  Time: 03/30 5378  Comment: Vascular Dr. Holder evaluating with consideration for transfer to  for definitive management; case managemetn Zoila made aware and to inquire regarding ICU  bed availability and hold critical care transport.  ------------------------------------------------------------  Time: 03/30 1346  Comment: Dr. Holder recommending transfer [facility] d/[facility] intensivist Dr. Pan.  ------------------------------------------------------------  Time: 03/30 1400  Comment: Case d/[facility] Batsheva Atwood/Andreia by Dr. Holder.  ------------------------------------------------------------  Time: 03/30 1410  Comment: ED course without acute events, ntg/esmolol ongoing in patient without pain.       MDM   DIFFERENTIAL DIAGNOSIS: After history and physical exam differential diagnosis includes but is not limited to aortopathy, graft leak/failure, ACS, VTE.    Pulse ox: 98%:Normal on RA, as independently interpreted by myself    Cardiac Monitor Interpretation:   Pulse Readings from Last 1 Encounters:   03/30/25 78   , sinus,      Medical Decision Making  Evaluation for atypical and intermittent right sided chest and subscapular back pain described as \"knot\" without associated abdominal pain or new lower extremity complaints with grossly neurovascular intact lower extremities.  CTA as noted for which esmolol/nitroglycerin initiated, case discussed with cardiothoracic surgeon Dr. Gregg and vascular Dr. Mota with plans for medical management, case d/w intensivsit/hospitalist Batsheva Wilkerson/[place] for admission; vascular Dr. Holder evaluating with plans for operative management exceeding [facility] institutional capabilities [facility] d/w  Batsheva Atwood/Andreia    Problems Addressed:  Dissection of aorta, unspecified portion of aorta (HCC): acute illness or injury    Amount and/or Complexity of Data Reviewed  External Data Reviewed: labs, radiology, ECG and notes.     Details: 11/2024 ED notes/CT results/labs/EKG reviewed  Labs: ordered. Decision-making details documented in ED Course.  Radiology: ordered and independent interpretation performed. Decision-making details documented in ED Course.     Details: CTA chest/abd  with descending/abdominal aortic dissections as independently interpreted by myself  ECG/medicine tests: ordered and independent interpretation performed.  Discussion of management or test interpretation with external provider(s): Case d/w hospitalist Dr. Kessler, intensivist Dr. Wilkerson, CT surgery Dr. Gregg, vascular Batsheva Mota/Gall[place]    Risk  Prescription drug management.  Parenteral controlled substances.  Drug therapy requiring intensive monitoring for toxicity.  Decision regarding hospitalization.  Emergency major surgery.  Elective major surgery with identified risk factors.    Critical Care  Total time providing critical care: 77 minutes      A total of 77 minutes of critical care time (exclusive of billable procedures) was administered to manage the patient's critical imaging findings, unstable vital signs, and cardiovascular instability due to her type B aortic dissection.  This involved direct patient intervention, complex decision making, and/or extensive discussions with the patient, family, and clinical staff.    I was wearing at minimum a facemask and eye protection throughout this encounter with handwashing performed prior and after patient evaluation without personal hand/facial/oropharyngeal contact and gloves worn throughout encounter. See note and/or contact this provider for further PPE details.    We recommend that you schedule follow up care with a primary care provider within the next three months to obtain basic health screening including reassessment of your blood pressure.      You had elevated blood pressure today and you need to follow up with your doctor for a repeat blood pressure check and further discussion of lifestyle modifications that include Weight Reduction - Dietary Sodium Restriction - Increased Physical Activity and Moderation in alcohol (ETOH) Consumption. If possible check your pressure at home and keep a blood pressure log to bring to your physician.  Disposition and Plan      Clinical Impression:  1. Dissection of aorta, unspecified portion of aorta (HCC)        Disposition:  Transfer to another facility    Follow-up:  No follow-up provider specified.    Medications Prescribed:  Current Discharge Medication List

## 2025-03-30 NOTE — PROGRESS NOTES
I was asked to evaluate the patient for type B aortic dissection with aneurysmal degeneration of the zone 3 and what appears to be the entry tear proximal to zone 2. Prior to evaluating the patient, I was reached out by [facility] Surgery group that they will take over the care of the patient. For that reason I defer the patient's care from vascular surgery stand point to [facility]. I did not evaluate the patient in person and except for impulse control (SBP<120 and HR<80), arterial line and aguilar catheter for strict urine out put measurement, I did not provide any further recommendations.

## 2025-03-30 NOTE — CONSULTS
This is a 59-year-old female with a past medical history of severe hypertension, chronic kidney disease stage III, history of a type a aortic dissection s/p repair in November 2024 at [facility] complicated by postoperative ileus and respiratory failure requiring tracheostomy and PEG tube placement who returns to [facility] today with chest and back pain x 2 to 3 days.  In the emergency department the patient was found to be hypertensive so a CT angio was done that showed an extending type B dissection.  Vascular surgery was consulted they were concerned that there was extension of the type B dissection and loss of the intimal integrity and therefore the patient was brought to [facility] with for impulse control.    On exam the patient was awake alert and oriented.  She had pulses in bilateral upper and lower extremities.  She had no pain when I saw her she said her chest pain and her abdominal pain had come all but resolved.  Stat labs have been ordered.  Her abdomen was soft nontender nondistended.  She went on esmolol and she was also on Cardene which I had started.    I placed a right radial arterial line given the fact that the patient was on multiple vasoactive agents multiple blood draws and wanted to have blood pressure control given the fact that the patient recently had a type B dissection and she was here with a progressive type B dissection.    Our vascular surgery had seen the patient at [facility] requested transfer to [facility] case the patient needed urgent surgical intervention

## 2025-03-30 NOTE — ED QUICK NOTES
[facility] loaded up patient and her belongings  Pt in stable condition  Report given to [facility] personnel  Sent with additional bag of esmolol from pharmacy

## 2025-03-30 NOTE — PROGRESS NOTES
Patient arrived from [facility] via [facility], monitored on gurney with Esmolol infusing at 75 mcg/kg/min and nitroglycerin infusing at 50 mcg/min.  Patient transferred to ICU bed and monitors w/o incident.  Patient is AO x 4, able to express needs and follow commands.  Denies pain at this time.  SR on the monitor, HR in the 70's.  SpO2 > 92% on RA.  's in (L) arm.  Goal of SBP < 130.  Pure wick in place.  Dr. Boswell at bedside for assessment of patient.  Patient updated on plan of care, she is contacting family.

## 2025-03-31 ENCOUNTER — APPOINTMENT (OUTPATIENT)
Dept: CT IMAGING | Facility: HOSPITAL | Age: 60
End: 2025-03-31
Attending: EMERGENCY MEDICINE
Payer: COMMERCIAL

## 2025-03-31 LAB
ALBUMIN SERPL-MCNC: 3.9 G/DL (ref 3.2–4.8)
ALBUMIN/GLOB SERPL: 1.4 {RATIO} (ref 1–2)
ALP LIVER SERPL-CCNC: 55 U/L
ALT SERPL-CCNC: <7 U/L
ANION GAP SERPL CALC-SCNC: 10 MMOL/L (ref 0–18)
AST SERPL-CCNC: <8 U/L (ref ?–34)
BASOPHILS # BLD AUTO: 0.02 X10(3) UL (ref 0–0.2)
BASOPHILS NFR BLD AUTO: 0.4 %
BILIRUB SERPL-MCNC: 0.2 MG/DL (ref 0.2–1.1)
BLOOD TYPE BARCODE: 7300
BUN BLD-MCNC: 14 MG/DL (ref 9–23)
CALCIUM BLD-MCNC: 9.1 MG/DL (ref 8.7–10.6)
CHLORIDE SERPL-SCNC: 108 MMOL/L (ref 98–112)
CO2 SERPL-SCNC: 20 MMOL/L (ref 21–32)
CREAT BLD-MCNC: 1.39 MG/DL
EGFRCR SERPLBLD CKD-EPI 2021: 43 ML/MIN/1.73M2 (ref 60–?)
EOSINOPHIL # BLD AUTO: 0.1 X10(3) UL (ref 0–0.7)
EOSINOPHIL NFR BLD AUTO: 2 %
ERYTHROCYTE [DISTWIDTH] IN BLOOD BY AUTOMATED COUNT: 16.1 %
GLOBULIN PLAS-MCNC: 2.8 G/DL (ref 2–3.5)
GLUCOSE BLD-MCNC: 114 MG/DL (ref 70–99)
GLUCOSE BLD-MCNC: 131 MG/DL (ref 70–99)
GLUCOSE BLD-MCNC: 99 MG/DL (ref 70–99)
HCT VFR BLD AUTO: 30.6 %
HGB BLD-MCNC: 9.4 G/DL
IMM GRANULOCYTES # BLD AUTO: 0.01 X10(3) UL (ref 0–1)
IMM GRANULOCYTES NFR BLD: 0.2 %
INR BLD: 1.15 (ref 0.8–1.2)
LYMPHOCYTES # BLD AUTO: 1.74 X10(3) UL (ref 1–4)
LYMPHOCYTES NFR BLD AUTO: 34 %
MCH RBC QN AUTO: 24.3 PG (ref 26–34)
MCHC RBC AUTO-ENTMCNC: 30.7 G/DL (ref 31–37)
MCV RBC AUTO: 79.1 FL
MONOCYTES # BLD AUTO: 0.57 X10(3) UL (ref 0.1–1)
MONOCYTES NFR BLD AUTO: 11.1 %
NEUTROPHILS # BLD AUTO: 2.68 X10 (3) UL (ref 1.5–7.7)
NEUTROPHILS # BLD AUTO: 2.68 X10(3) UL (ref 1.5–7.7)
NEUTROPHILS NFR BLD AUTO: 52.3 %
OSMOLALITY SERPL CALC.SUM OF ELEC: 287 MOSM/KG (ref 275–295)
PHOSPHATE SERPL-MCNC: 4.5 MG/DL (ref 2.4–5.1)
PLATELET # BLD AUTO: 249 10(3)UL (ref 150–450)
POTASSIUM SERPL-SCNC: 4.6 MMOL/L (ref 3.5–5.1)
POTASSIUM SERPL-SCNC: 4.6 MMOL/L (ref 3.5–5.1)
PROT SERPL-MCNC: 6.7 G/DL (ref 5.7–8.2)
PROTHROMBIN TIME: 14.8 SECONDS (ref 11.6–14.8)
RBC # BLD AUTO: 3.87 X10(6)UL
SODIUM SERPL-SCNC: 138 MMOL/L (ref 136–145)
UNIT VOLUME: 350 ML
WBC # BLD AUTO: 5.1 X10(3) UL (ref 4–11)

## 2025-03-31 PROCEDURE — 99291 CRITICAL CARE FIRST HOUR: CPT | Performed by: EMERGENCY MEDICINE

## 2025-03-31 PROCEDURE — 74174 CTA ABD&PLVS W/CONTRAST: CPT | Performed by: EMERGENCY MEDICINE

## 2025-03-31 PROCEDURE — 71275 CT ANGIOGRAPHY CHEST: CPT | Performed by: EMERGENCY MEDICINE

## 2025-03-31 PROCEDURE — 99291 CRITICAL CARE FIRST HOUR: CPT | Performed by: INTERNAL MEDICINE

## 2025-03-31 RX ORDER — MORPHINE SULFATE 2 MG/ML
2 INJECTION, SOLUTION INTRAMUSCULAR; INTRAVENOUS
Status: DISCONTINUED | OUTPATIENT
Start: 2025-03-31 | End: 2025-04-02

## 2025-03-31 RX ORDER — MORPHINE SULFATE 2 MG/ML
INJECTION, SOLUTION INTRAMUSCULAR; INTRAVENOUS
Status: COMPLETED
Start: 2025-03-31 | End: 2025-03-31

## 2025-03-31 NOTE — PROGRESS NOTES
CT reviewed   As suspected left external iliac artery with significant stenosis from false lumen   Discussed plan   Surgical plan - left carotid to subclavian transposition, fenestrated arch repair, thoracic stent for dissection, left renal stent and bilateral ilaic stent placement

## 2025-03-31 NOTE — PLAN OF CARE
Received patient after report. Patient resting in bed on room air. C/o new onset back pain radiating to abdomen and groin regions. Unable to relieve or improve pain with repositioning. MD notified. See orders. CTA changed from routine to stat. See results. Remains on esmolol and cardene drips. Cleared for diet today. Up to chair and commode. Family visiting at bedside. Updated on plan of care.

## 2025-03-31 NOTE — PLAN OF CARE
Assumed pt care this evening. No acute events overnight. VSS on RA. CPAP while asleep. NSR per tele. Titrating cardene & esmolol gtts for SBP goal <130 per radial art-line. Denies CP/SOB/back pain. External catheter in place. NPO @ 0000. Pt updated on POC.    Problem: Diabetes/Glucose Control  Goal: Glucose maintained within prescribed range  Description: INTERVENTIONS:- Monitor Blood Glucose as ordered- Assess for signs and symptoms of hyperglycemia and hypoglycemia- Administer ordered medications to maintain glucose within target range- Assess barriers to adequate nutritional intake and initiate nutrition consult as needed- Instruct patient on self management of diabetes  Outcome: Progressing     Problem: Patient/Family Goals  Goal: Patient/Family Long Term Goal  Description: Patient's Long Term Goal: To go home. Interventions:- PT/OT. Trend labs/vs. - See additional Care Plan goals for specific interventions  Outcome: Progressing  Goal: Patient/Family Short Term Goal  Description: Patient's Short Term Goal: Pain management. Interventions: - Take pain meds prn. Reposition. - See additional Care Plan goals for specific interventions  Outcome: Progressing

## 2025-03-31 NOTE — PROGRESS NOTES
[facility]   part of [facility]     Vascular Surgery Progress Note    Alisha Wilde Patient Status:  Inpatient    10/25/1965 MRN [id number]   Location [facility] 4SW-A Attending Sumit Kapoor MD   Hosp Day # 1 PCP Bridger Avendano MD     Objective:   Temp: 97.1 °F (36.2 °C)  Pulse: 60  Resp: 15  BP: 124/51  AO: 128/55    Intake/Output:     Intake/Output Summary (Last 24 hours) at 3/31/2025 0844  Last data filed at 3/31/2025 0630  Gross per 24 hour   Intake 2422 ml   Output 500 ml   Net 1922 ml     Events Yesterday/Overnight:    No acute events overnight. No nausea or vomiting. Results reviewed- HGB 9.4- vitals stable.     Exam:    Patient seen and examined this morning- recently received PRN morphine for worsening back/abdominal pain- patient at this time states pain is improved with PRN medication. Patient denies any chest pain/shortness of breath/ or back pain. States she has some minimal pain along her lower abdomen. Currently on esmolol and Cardene gtt being titrated to maintain SBP <130 per radial art-line.     Impression/Plan:    -STAT CTA chest/abd/pelvis  -Continue to keep SBP <130  -PRN pain medication for management of pain     Medications:  Current Facility-Administered Medications   Medication Dose Route Frequency    morphINE PF 2 MG/ML injection 2 mg  2 mg Intravenous Q1H PRN    acetaminophen (Tylenol Extra Strength) tab 500 mg  500 mg Oral Q4H PRN    melatonin tab 3 mg  3 mg Oral Nightly PRN    polyethylene glycol (PEG 3350) (Miralax) 17 g oral packet 17 g  17 g Oral Daily PRN    sennosides (Senokot) tab 17.2 mg  17.2 mg Oral Nightly PRN    bisacodyl (Dulcolax) 10 MG rectal suppository 10 mg  10 mg Rectal Daily PRN    fleet enema (Fleet) rectal enema 133 mL  1 enema Rectal Once PRN    niCARdipine in sodium chloride 0.86% (carDENE) 20 mg/200mL infusion premix  5-15 mg/hr Intravenous Continuous    pantoprazole (Protonix) 40 mg in sodium chloride 0.9% PF 10 mL IV push  40 mg Intravenous  Q24H    ALPRAZolam (Xanax) tab 0.5 mg  0.5 mg Oral TID PRN    ondansetron (Zofran) 4 MG/2ML injection 4 mg  4 mg Intravenous Q6H PRN    prochlorperazine (Compazine) 10 MG/2ML injection 5 mg  5 mg Intravenous Q8H PRN    ezetimibe (Zetia) tab 10 mg  10 mg Oral Daily    esmolol (Brevibloc) 2000 mg/100mL infusion premix   mcg/kg/min Intravenous Continuous       Laboratory/Data:    LABS:  Recent Labs   Lab 03/30/25  0757 03/30/25  1631 03/30/25  1632 03/31/25  0320   WBC 7.9  --  6.4 5.1   HGB 9.8*  --  10.0* 9.4*   MCV 79.5*  --  78.4* 79.1*   .0  --  268.0 249.0   INR 1.04 1.14  --  1.15       Recent Labs   Lab 03/30/25 0757 03/30/25  1631 03/31/25  0320    145 138   K 4.1 3.2* 4.6  4.6    116* 108   CO2 22.0 18.0* 20.0*   BUN 16 12 14   CREATSERUM 1.37* 0.92 1.39*   * 84 99   CA 9.4 7.4* 9.1   PHOS  --  2.5 4.5     Recent Labs   Lab 03/30/25 0757 03/30/25  1631 03/31/25  0320   PTP 14.2 14.7 14.8   INR 1.04 1.14 1.15   PTT 29.6  --   --      Recent Labs   Lab 03/30/25  1631 03/31/25  0320   ALT <7* <7*   AST <8 <8   ALB 3.3 3.9     No results for input(s): \"TROP\" in the last 168 hours.  Lab Results   Component Value Date    ANASCRN Negative 06/05/2021     No results for input(s): \"PCACT\", \"PSACT\", \"AT3ACT\", \"HIPAB\", \"PATHI\", \"STALA\", \"DRVVTRATIO\", \"DRVVT\", \"STACLOT\", \"CARIGG\", \"U5XE4ZULWL\", \"Y5CZ1LBYJH\", \"RA\", \"HAVIGM\", \"HBCIGM\", \"HCVAB\", \"HBSAG\", \"HBCAB\", \"HBVDNAINTERP\", \"ANAS\", \"C3\", \"C4\" in the last 168 hours.    Patsy RAVI, APRN  3/31/2025  8:44 AM

## 2025-03-31 NOTE — TELEPHONE ENCOUNTER
No refill protocol / failed protocol. Please review     Aspirin- discontinued on 3/28/2025 by Katie Sánchez CMA for the following reason: Patient discontinued     Thiamine and Mirtazapine are marked external.

## 2025-03-31 NOTE — PROGRESS NOTES
CRITICAL CARE PROGRESS NOTE    Patient Name: Alisha Wilde  : 10/25/1965  MRN: [id number]  Admit Date: 3/30/2025  Length of Stay: 0 Days    Subjective/24h events: Patient on Cardene 10/h and esmolol 200 blood pressure control with patient having some new back pains will give as needed pain meds will repeat CT stat will speak to vascular surgeon for updates    Assessment and Plan: 59-year-old female previous type a repair with complicated postoperative course now with type B dissection    Neuro/Psych: No encephalopathy or delirium  Will need further imaging MRI brain carotids per vascular surgery    Cardiovascular: Type B dissection blood pressure goals per vascular but 130/80 with nicardipine and esmolol for impulse control  Vascular surgery planning optimal time for operative repair possible lumbar drain at which time patient would need different intensive care unit CTA stat given new back pain    Previous type a dissection repair with prolonged postoperative course stable presumably hypertension related    Pulmonary: Previous tracheostomy postoperatively now has been reversed no current active issues patient on room air    GI: Patient status post PEG tube from complicated postoperative course now reversed  Cardiac diet n.p.o. for now    Renal/Metabolic: Chronic kidney disease yesterday seem to have normal creatinine but likely more dilutional today appears to be closer to baseline    Heme: Anemia presumably some of this is postoperative as well as iron deficiency now stable no significant thrombocytopenia    ID: No fever no leukocytosis no reason for antibiotics    Endocrine: No history of endocrinopathy      ICU checklist  Feeding: Low-salt diet  Analgesia:   Sedation:   Thromboembolism PPX: Hold  Stress Ulcer PPX: PPI  Glucose control:   Bowel Regimen:   Lines/drains/tubes:   Deescalation of antibiotics:   Therapies:PT/OT/ST when appropriate    Code Status: Full Code          Physical Exam  General: No acute  distress  Neuro: AO x3, non focal   Lungs: Clear  Cardio:  RRR  Abdomen: Soft, non tender, non distended  Extremities: Warm, no swelling     Hemodynamics  24h Vitals:  VitalLast Value (24 Hour)24 Hour Range  Temp97.7 °F (36.5 °C)Temp  Min: 97.7 °F (36.5 °C)  Max: 98.3 °F (36.8 °C)  [id number]  Min: 63  Max: 78  BP (Non-Invasive)136/75 BP  Min: 111/68  Max: 164/80  BP (A-Line)124/57AO  Min: 124/57  Max: 168/67  CVP  could not be evaluated. This SmartLink does not work with rows of the type:   [id number]  Min: 13  Max: 23  [id number] %SpO2  Min: 93 %  Max: 99 %  O2 Therapy       Upon my evaluation, this patient had a high probability of imminent or life-threatening deterioration due to hypertension emergency, circulatory failure, and aortic dissection, which required my direct attention, intervention, and personal management.    I have personally provided 35 minutes of critical care time, exclusive of time spent on separately billable procedures.  Time includes review of all pertinent laboratory/radiology results, discussion with consultants, and monitoring for potential decompensation.  Performed interventions included vasoactive medications and multiple antihypertensive medications being titrated with acute pain with worsening dissection leading to possible death .

## 2025-03-31 NOTE — PROGRESS NOTES
[facility]   part of [facility]     Hospitalist Progress Note     Alisha Wilde Patient Status:  Inpatient    10/25/1965 MRN [id number]   Location [facility] 4SW-A Attending Sumit Kapoor MD   Hosp Day # 1 PCP Bridger Avendano MD     Chief Complaint: chest pain    Subjective:     Patient without acute events overnight. Pt with sudden back pain radiating to her chest again this morning. BP controlled. No F/C.     Objective:    Review of Systems:   A comprehensive review of systems was completed; pertinent positive and negatives stated in subjective.    Vital signs:  Temp:  [97 °F (36.1 °C)-98.3 °F (36.8 °C)] 97.1 °F (36.2 °C)  Pulse:  [60-73] 60  Resp:  [11-23] 15  BP: (111-150)/(46-78) 124/51  SpO2:  [93 %-99 %] 95 %  AO: (109-168)/(46-68) 128/55    Physical Exam:    General: No acute distress  Respiratory: No wheezes, no rhonchi  Cardiovascular: S1, S2, regular rate and rhythm  Abdomen: Soft, Non-tender, non-distended, positive bowel sounds  Neuro: No new focal deficits.   Extremities: No edema      Diagnostic Data:    Labs:  Recent Labs   Lab 25  0757 25  1631 25  1632 25  0320   WBC 7.9  --  6.4 5.1   HGB 9.8*  --  10.0* 9.4*   MCV 79.5*  --  78.4* 79.1*   .0  --  268.0 249.0   INR 1.04 1.14  --  1.15       Recent Labs   Lab 25  0757 25  1631 25  0320   * 84 99   BUN 16 12 14   CREATSERUM 1.37* 0.92 1.39*   CA 9.4 7.4* 9.1   ALB  --  3.3 3.9    145 138   K 4.1 3.2* 4.6  4.6    116* 108   CO2 22.0 18.0* 20.0*   ALKPHO  --  46* 55   AST  --  <8 <8   ALT  --  <7* <7*   BILT  --  0.2 0.2   TP  --  5.6* 6.7       Estimated Glomerular Filtration Rate: 43 mL/min/1.73m2 (A) (result from lab).    Recent Labs   Lab 25  0757   TROPHS 8       Recent Labs   Lab 25  0757 25  1631 25  0320   PTP 14.2 14.7 14.8   INR 1.04 1.14 1.15                  Microbiology    No results found for this visit on  03/30/25.      Imaging: Reviewed in Epic.    Medications:    pantoprazole  40 mg Intravenous Q24H    ezetimibe  10 mg Oral Daily       Assessment & Plan:      #Type B aortic Dissection  #L renal artery dissection  Monitor in ICU  BP control  STAT CTA chest/abd/pelvis now  Continue cardene and esmolol gtt  Vascular surgery following  Awaiting decision on lumbar drain placement  Goal SBP <130, monitor closely given symptoms of CP and back pain  Pain control with PRN moprhine  Keep NPO until imaging returns  Tentatively scheduled for OR 4/2 but await imaging     #Prior hx of Type A aortic dissection  S/p repair 11/2024  Post op complications with rep failure and NAIMA needing trach and peg that are now removed     #Ess HTN  Resume meds  BP control    #Dyslipidemia  zetia     #CKD3     #TANYA     #Obesity, BMI 36    A total of 35 minutes of critical care time (exclusive of billable procedures) was administered. This involved direct patient intervention, complex decision making, and/or extensive discussions (>50% face to face time) with the patient, family, and clinical staff.         Sumit Kapoor MD    Supplementary Documentation:     Quality:  DVT Mechanical Prophylaxis:   SCDs, Early ambuation  DVT Pharmacologic Prophylaxis   Medication   None                Code Status: Full Code  Webb: External urinary catheter in place  Webb Duration (in days):   Central line:    BRANDON:     Discharge is dependent on: progress  At this point Ms. Wilde is expected to be discharge to: tbd    The 21st Century Cures Act makes medical notes like these available to patients in the interest of transparency. Please be advised this is a medical document. Medical documents are intended to carry relevant information, facts as evident, and the clinical opinion of the practitioner. The medical note is intended as peer to peer communication and may appear blunt or direct. It is written in medical language and may contain abbreviations or  verbiage that are unfamiliar.

## 2025-03-31 NOTE — PAYOR COMM NOTE
--------------  ADMISSION REVIEW     Payor: [employer] CHOICE/HMO/POS/EPO  Subscriber #:  [id number]  Authorization Number: [id number]    Admit date: 3/30/25  Admit time:  3:05 PM         Related encounter: ED from 3/30/2025 in [facility] Emergency Department     Patient Seen in: [facility] Emergency Department     Stated Complaint: CP, back pain       59-year-old female with past medical history of hypertension, dyslipidemia, type aortic dissection status post graft repair in November presenting for evaluation with several weeks of intermittent right-sided chest and subscapular chest discomfort described as \"a knot\" without exacerbating relieving factors.  No exertional pleuritic complaints, no shortness of breath.  Also had a complaints of intermittent left leg discomfort since hospital discharge without any complaints; no focal weakness or paresthesias, no lower back, no abdominal pain.     Past Medical History       Past Medical History:    Chronic kidney disease (CKD)    Esophageal reflux    High blood pressure    High cholesterol    HYPERTENSION    Hypertension    Unspecified essential hypertension            Past Surgical History       Past Surgical History:   Procedure    Anesth,open heart surgery    Colonoscopy     Procedure: COLONOSCOPY;  Surgeon: Magdy Webber MD;  Location: [facility] ENDOSCOPY    Endometrial ablation     child passed away for 5 mins         1    Other surgical history     cysto-Dr. Lacey -- pt denies                ED Triage Vitals [25 0725]   BP (!) 164/80   Pulse 78   Resp 20   Temp 98.1 °F (36.7 °C)   Temp src Oral   SpO2 98 %   O2 Device None (Room air)       Current:BP (!) 164/80   Pulse 78   Temp 98.1 °F (36.7 °C) (Oral)   Resp 20   Ht 165.1 cm (5' 5\")   Wt 95.3 kg   SpO2 98%   BMI 34.95 kg/m²       Physical Exam   Constitutional: No distress.  Nontoxic, well-appearing, pleasantly conversational.  HEENT: MM.  Head: Normocephalic.   Eyes: No  injection.   Cardiovascular: RRR.  Upper extremities with 2+ radial pulses.  Lower extremities with intact DP/PT pulses.  Pulmonary/Chest: Effort normal. CTAB.  Abdominal: Soft.  Nontender.  Musculoskeletal: No gross deformity.  No midline thoracolumbar tenderness/step-off/deformity.  Neurological: Alert.  Cranial nerves II to XII grossly intact.  Bilateral upper and lower extremities with 5/5 strength proximally and distally.  Skin: Skin is warm.   Psychiatric: Cooperative.  Nursing note and vitals reviewed.     Labs Reviewed   CBC WITH DIFFERENTIAL WITH PLATELET - Abnormal; Notable for the following components:       Result Value      HGB 9.8 (*)       HCT 32.1 (*)       MCV 79.5 (*)       MCH 24.3 (*)       MCHC 30.5 (*)       RDW 15.9 (*)       All other components within normal limits   BASIC METABOLIC PANEL (8) - Abnormal; Notable for the following components:     Glucose 136 (*)       Creatinine 1.37 (*)       eGFR-Cr 44 (*)       All other components within normal limits   POCT GLUCOSE - Abnormal; Notable for the following components:     POC Glucose  119 (*)       All other components within normal limits   TROPONIN I HIGH SENSITIVITY - Normal   PTT, ACTIVATED - Normal   PROTHROMBIN TIME (PT) - Normal     EKG NSR 80 without ST elevation as independently interpreted by myself     CTA CHEST+CTA ABDOMEN DISSECT SET      Type A extending aortic dissection, status post aneurysm repair with ascending aortic stent graft.  Partially thrombosed ascending thoracic aortic aneurysm measures 4.5 cm, probably unchanged from 11/26/2024 noncontrast CT.  Correlate with more recent prior outside imaging to assess stability of this finding.  Progressed/new type B aortic dissection with dissection flap extending from the distal aortic arch to the bilateral common iliac arteries and the right external iliac artery to the edge of the field of view of this examination.  Associated areas of moderate and severe narrowing of the  true lumen of the thoracic and abdominal aorta.  The left renal artery and celiac trunk arise from the false lumen.  Dissection flap extends to the left renal artery by approximately 9 mm. Recommend thoracic/vascular surgery consultation.  For reference, the proximal descending thoracic aorta measures 5.0 cm, previously 3.9 cm on 11/26/2024.    Findings were discussed with Dr Hayes  in the emergency room on 03/30/2025 at 9:27 a.m..   Dictated by (CST): Karen Urñea MD on 3/30/2025 at 9:13 AM     Finalized by (CST): Karen Ureña MD on 3/30/2025 at 9:32 AM            Time: 03/30 0942  Comment: CT as noted with esmolol/ntg to be initiated in patietn resting comfortable with NVI lower extremities.  ------------------------------------------------------------  Time: 03/30 1153  Comment: Awaiting ICU placement, case already d/w hospitalist Dr. Kessler, intensivist Dr. Wilkerson, CT surgery Dr. Gregg, vascular ; exam unchanged in patient without pain.  ------------------------------------------------------------  Time: 03/30 1319  Comment: Vascular Dr. Holder evaluating with consideration for transfer to  for definitive management; case managemetdeja Cummings made aware and to inquire regarding ICU bed availability and hold critical care transport.  ------------------------------------------------------------  Time: 03/30 1346  Comment: Dr. Holder recommending transfer who d/w  intensivist Dr. Pan.  ------------------------------------------------------------  Time: 03/30 1400  Comment: Case d/w EH Batsheva Atwood/Andreia by Dr. Holder.  ------------------------------------------------------------  Time: 03/30 1410  Comment: ED course without acute events, ntg/esmolol ongoing in patient without pain.      Medical Decision Making  Evaluation for atypical and intermittent right sided chest and subscapular back pain described as \"knot\" without associated abdominal pain or new lower extremity complaints with  grossly neurovascular intact lower extremities.  CTA as noted for which esmolol/nitroglycerin initiated, case discussed with cardiothoracic surgeon Dr. Gregg and vascular Dr. Mota with plans for medical management, case d/w intensivsit/hospitalist Batsheva Wilkerson/[place] for admission; vascular Dr. Holder evaluating with plans for operative management exceeding [facility] institutional capabilities who d/w EH Batsheva Atwood/Andreia     Problems Addressed:  Dissection of aorta, unspecified portion of aorta (HCC): acute illness or injury   Discussion of management or test interpretation with external provider(s): Case d/w hospitalist Dr. Kessler, intensivist Dr. Wilkerson, CT surgery Dr. Gregg, vascular Batsheva Mota/Gallo      Disposition and Plan      Clinical Impression:  1. Dissection of aorta, unspecified portion of aorta (HCC)        Disposition:  Transfer to another facility    [facility]     NURSING:    Patient arrived from [place] via [facility], monitored on [facility] with Esmolol infusing at 75 mcg/kg/min and nitroglycerin infusing at 50 mcg/min.  Patient transferred to ICU bed and monitors w/o incident.  Patient is AO x 4, able to express needs and follow commands.  Denies pain at this time.  SR on the monitor, HR in the 70's.  SpO2 > 92% on RA.  's in (L) arm.  Goal of SBP < 130.  Pure wick in place.  Dr. Boswell at bedside for assessment of patient     CRITICAL CARE:    This is a 59-year-old female with a past medical history of severe hypertension, chronic kidney disease stage III, history of a type a aortic dissection s/p repair in November 2024 at [facility] complicated by postoperative ileus and respiratory failure requiring tracheostomy and PEG tube placement who returns to [facility] today with chest and back pain x 2 to 3 days.  In the emergency department the patient was found to be hypertensive so a CT angio was done that showed an extending type B dissection.   Vascular surgery was consulted they were concerned that there was extension of the type B dissection and loss of the intimal integrity and therefore the patient was brought to [facility] with for impulse control.     On exam the patient was awake alert and oriented.  She had pulses in bilateral upper and lower extremities.  She had no pain when I saw her she said her chest pain and her abdominal pain had come all but resolved.  Stat labs have been ordered.  Her abdomen was soft nontender nondistended.  She went on esmolol and she was also on Cardene which I had started.     I placed a right radial arterial line given the fact that the patient was on multiple vasoactive agents multiple blood draws and wanted to have blood pressure control given the fact that the patient recently had a type B dissection and she was here with a progressive type B dissection.     Our vascular surgery had seen the patient at [facility] requested transfer to [facility] case the patient needed urgent surgical intervention      HOSPITALIST:    History and Physical      Alisha Wilde is a 59 year old female with PMhx of GERD< HTN, DL, aortic dissection presents with chest pain and back pain. Pt was initially seen at [facility] and transferred to [facility] as she was found to have type B aortic dissection. She currently denies any CP. She is breathing ok. No F/C. No N/V/D/C or abd pain. She had her repair of her Type A aortic dissection on 11/21/24    Physical Exam:    /75 (BP Location: Left arm)   Pulse 68   Temp 97.7 °F (36.5 °C) (Temporal)   Resp 15   Wt 222 lb 3.6 oz (100.8 kg)   SpO2 97%   BMI 36.98 kg/m²   General: No acute distress, Alert  Respiratory: No rhonchi, no wheezes  Cardiovascular: S1, S2. Regular rate and rhythm  Abdomen: Soft, Non-tender, non-distended, positive bowel sounds  Neuro: No new focal deficits  Extremities: No edema     Lab 03/30/25  0757 03/30/25  1632   RBC 4.04 4.02   HGB 9.8* 10.0*   HCT 32.1* 31.5*    MCV 79.5* 78.4*   MCH 24.3* 24.9*   MCHC 30.5* 31.7   RDW 15.9* 15.9   NEPRELIM 5.36 4.26   WBC 7.9 6.4   .0 268.0      * 84   BUN 16 12   CREATSERUM 1.37* 0.92   EGFRCR 44* 71   CA 9.4 7.4*   ALB  --  3.3    145   K 4.1 3.2*    116*   CO2 22.0 18.0*   ALKPHO  --  46*   AST  --  <8   ALT  --  <7*   BILT  --  0.2   TP  --  5.6*      Assessment & Plan:  #Type B aortic Dissection  #L renal artery dissection  Monitor in ICU  BP control  Continue cardene and esmolol gtt  Vascular surgery following  Awaiting decision on lumbar drain placement  Goal SBP <130  Awaiting decision on CTA carotid/brain vs MRI     #Prior hx of Type A aortic dissection  S/p repair 11/2024  Post op complications with rep failure and NAIMA needing trach and peg that are now removed     #Ess HTN  Resume meds  BP control    #Dyslipidemia  zetia     #CKD3     #TANYA     #Obesity, BMI 36      3/31:    REMAINS IN ICU     HOSPITALIST:    Patient without acute events overnight. Pt with sudden back pain radiating to her chest again this morning. BP controlled. No F/C.      Vital signs:  Temp:  [97 °F (36.1 °C)-98.3 °F (36.8 °C)] 97.1 °F (36.2 °C)  Pulse:  [60-73] 60  Resp:  [11-23] 15  BP: (111-150)/(46-78) 124/51  SpO2:  [93 %-99 %] 95 %  AO: (109-168)/(46-68) 128/55     Physical Exam:    General: No acute distress  Respiratory: No wheezes, no rhonchi  Cardiovascular: S1, S2, regular rate and rhythm  Abdomen: Soft, Non-tender, non-distended, positive bowel sounds  Neuro: No new focal deficits.   Extremities: No edema     Lab 03/30/25  0757 03/30/25  1631 03/30/25  1632 03/31/25  0320   WBC 7.9  --  6.4 5.1   HGB 9.8*  --  10.0* 9.4*   MCV 79.5*  --  78.4* 79.1*   .0  --  268.0 249.0   INR 1.04 1.14  --  1.15      Lab 03/30/25  0757 03/30/25  1631 03/31/25  0320   * 84 99   BUN 16 12 14   CREATSERUM 1.37* 0.92 1.39*   CA 9.4 7.4* 9.1   ALB  --  3.3 3.9    145 138   K 4.1 3.2* 4.6  4.6    116* 108   CO2  22.0 18.0* 20.0*   ALKPHO  --  46* 55   AST  --  <8 <8   ALT  --  <7* <7*   BILT  --  0.2 0.2   TP  --  5.6* 6.7      Lab 03/30/25  0757 03/30/25  1631 03/31/25  0320   PTP 14.2 14.7 14.8   INR 1.04 1.14 1.15       Assessment & Plan:  #Type B aortic Dissection  #L renal artery dissection  Monitor in ICU  BP control  STAT CTA chest/abd/pelvis now  Continue cardene and esmolol gtt  Vascular surgery following  Awaiting decision on lumbar drain placement  Goal SBP <130, monitor closely given symptoms of CP and back pain  Pain control with PRN moprhine  Keep NPO until imaging returns  Tentatively scheduled for OR 4/2 but await imaging     #Prior hx of Type A aortic dissection  S/p repair 11/2024  Post op complications with rep failure and NAIMA needing trach and peg that are now removed     #Ess HTN  Resume meds  BP control    #Dyslipidemia  zetia     #CKD3     #TANYA     #Obesity, BMI 36       CRITICAL CARE:    Subjective/24h events: Patient on Cardene 10/h and esmolol 200 blood pressure control with patient having some new back pains will give as needed pain meds will repeat CT stat will speak to vascular surgeon for updates     Assessment and Plan: 59-year-old female previous type a repair with complicated postoperative course now with type B dissection     Neuro/Psych: No encephalopathy or delirium  Will need further imaging MRI brain carotids per vascular surgery     Cardiovascular: Type B dissection blood pressure goals per vascular but 130/80 with nicardipine and esmolol for impulse control  Vascular surgery planning optimal time for operative repair possible lumbar drain at which time patient would need different intensive care unit CTA stat given new back pain     Previous type a dissection repair with prolonged postoperative course stable presumably hypertension related     Pulmonary: Previous tracheostomy postoperatively now has been reversed no current active issues patient on room air     GI: Patient status post  PEG tube from complicated postoperative course now reversed  Cardiac diet n.p.o. for now     Renal/Metabolic: Chronic kidney disease yesterday seem to have normal creatinine but likely more dilutional today appears to be closer to baseline     Heme: Anemia presumably some of this is postoperative as well as iron deficiency now stable no significant thrombocytopenia     ID: No fever no leukocytosis no reason for antibiotics     Endocrine: No history of endocrinopathy      ICU checklist  Feeding: Low-salt diet  Analgesia:   Sedation:   Thromboembolism PPX: Hold  Stress Ulcer PPX: PPI  Glucose control:   Bowel Regimen:   Lines/drains/tubes:   Deescalation of antibiotics:   Therapies:PT/OT/ST when appropriate     Code Status: Full Code     Physical Exam  General: No acute distress  Neuro: AO x3, non focal   Lungs: Clear  Cardio:  RRR  Abdomen: Soft, non tender, non distended  Extremities: Warm, no swelling        VASCULAR:    Objective:   Temp: 97.1 °F (36.2 °C)  Pulse: 60  Resp: 15  BP: 124/51  AO: 128/55    Events Yesterday/Overnight:     No acute events overnight. No nausea or vomiting. Results reviewed- HGB 9.4- vitals stable.      Exam:     Patient seen and examined this morning- recently received PRN morphine for worsening back/abdominal pain- patient at this time states pain is improved with PRN medication. Patient denies any chest pain/shortness of breath/ or back pain. States she has some minimal pain along her lower abdomen. Currently on esmolol and Cardene gtt being titrated to maintain SBP <130 per radial art-line.      Impression/Plan:     -STAT CTA chest/abd/pelvis  -Continue to keep SBP <130  -PRN pain medication for management of pain       Current Hospital Medications[]Expand by Default          Current Facility-Administered Medications   Medication Dose Route Frequency    niCARdipine in sodium chloride 0.86% (carDENE) 20 mg/200mL infusion premix  5-15 mg/hr Intravenous Continuous    pantoprazole  (Protonix) 40 mg in sodium chloride 0.9% PF 10 mL IV push  40 mg Intravenous Q24H    ezetimibe (Zetia) tab 10 mg  10 mg Oral Daily    esmolol (Brevibloc) 2000 mg/100mL infusion premix   mcg/kg/min Intravenous Continuous           STAT  CTA CHEST CTA ABDOMEN CTA PELVIS     1. Type B aortic dissection extends from the aortic arch at the level of the right innominate artery through the abdominal aorta with extension into the proximal left renal artery with moderate narrowing of the true lumen of the left renal artery.  The   inferior mesenteric artery arises from the opacified false lumen.  The dissection also extends through the aortic bifurcation with termination in the proximal right common iliac artery and mid left external iliac artery.  There is marked narrowing of the    true lumen of the proximal left external iliac artery at distal level of the dissection with suspected thrombosis of the false lumen.  Early contrast opacification of the false lumen proximally and in the abdominal aorta indicates flow through the   intimal flap at these levels.  There is stable moderate to marked narrowing of the true lumen of the thoracic and abdominal aorta.  There is stable dilatation of the aortic arch measuring up to 5 cm.   2. Stable postsurgical changes of repair of ascending aorta.  New line interval development of small bilateral pleural effusions and mild dependent atelectasis.   3. Uncomplicated diverticula of left colon.     MEDICATIONS ADMINISTERED IN LAST 1 DAY:  ALPRAZolam (Xanax) tab 0.5 mg       Date Action Dose Route User    3/30/2025 2154 Given 0.5 mg Oral Harjit Fuller RN    3/30/2025 1746 Given 0.5 mg Oral Mitchel Hernandez, CYNDIE          esmolol (Brevibloc) 2000 mg/100mL infusion premix       Date Action Dose Route User    3/31/2025 1445 New Bag 200 mcg/kg/min × 100.8 kg Intravenous Dianne Headley RN    3/31/2025 1307 New Bag 200 mcg/kg/min × 100.8 kg Intravenous Dianne Headley RN    3/31/2025  1215 Rate/Dose Change 200 mcg/kg/min × 100.8 kg Intravenous Dianne Headley, RN    3/31/2025 1144 New Bag 185 mcg/kg/min × 100.8 kg Intravenous Dianne Headley, RN    3/31/2025 0940 New Bag 185 mcg/kg/min × 100.8 kg Intravenous Dianne Headley, RN    3/31/2025 0806 New Bag 185 mcg/kg/min × 100.8 kg Intravenous Dianne Headley, RN    3/31/2025 0612 New Bag 200 mcg/kg/min × 100.8 kg Intravenous Harjit Fuller, RN    3/31/2025 0600 Rate/Dose Verify 200 mcg/kg/min × 100.8 kg Intravenous Harjit Fuller, RN    3/31/2025 0440 New Bag 200 mcg/kg/min × 100.8 kg Intravenous Harjit Fuller, RN    3/31/2025 0400 Rate/Dose Verify 200 mcg/kg/min × 100.8 kg Intravenous Harjit Fuller, RN    3/31/2025 0310 New Bag 200 mcg/kg/min × 100.8 kg Intravenous FullerHarjit grayson, RN    3/31/2025 0200 Rate/Dose Verify 200 mcg/kg/min × 100.8 kg Intravenous Harjit Fuller, RN    3/31/2025 0145 New Bag 200 mcg/kg/min × 100.8 kg Intravenous Harjit Fuller, RN    3/31/2025 0007 New Bag 150 mcg/kg/min × 100.8 kg Intravenous FullerHarjit grayson, RN    3/30/2025 2218 New Bag 150 mcg/kg/min × 100.8 kg Intravenous FullerHarjit, RN    3/30/2025 2200 Rate/Dose Verify 200 mcg/kg/min × 100.8 kg Intravenous Harjit Fuller, RN    3/30/2025 2155 Rate/Dose Change 200 mcg/kg/min × 100.8 kg Intravenous FullerHarjit grayson, RN    3/30/2025 2102 Rate/Dose Change 250 mcg/kg/min × 100.8 kg Intravenous Harjit Fuller, RN    3/30/2025 2059 New Bag 275 mcg/kg/min × 100.8 kg Intravenous Harjit Fuller RN    3/30/2025 2000 Rate/Dose Verify 275 mcg/kg/min × 100.8 kg Intravenous Harjit Fuller RN    3/30/2025 1945 New Bag 275 mcg/kg/min × 100.8 kg Intravenous Harjit Fuller RN    3/30/2025 1851 New Bag 275 mcg/kg/min × 100.8 kg Intravenous Mitchel Hernandez RN          esmolol (Brevibloc) 2500 mg/250mL infusion premix       Date Action Dose Route User    3/30/2025 1736 New Bag 275 mcg/kg/min × 100.8 kg Intravenous Mitchel Hernandez RN    3/30/2025 1706 Rate/Dose  Change 275 mcg/kg/min × 100.8 kg Intravenous Mitchel Hernandez RN    3/30/2025 1638 Rate/Dose Change 250 mcg/kg/min × 100.8 kg Intravenous Mitchel Hernandez RN    3/30/2025 1609 Rate/Dose Change 200 mcg/kg/min × 100.8 kg Intravenous Mitchel Hernandez RN    3/30/2025 1601 Rate/Dose Change 150 mcg/kg/min × 100.8 kg Intravenous Mitchel Hernandez RN    3/30/2025 1548 Rate/Dose Change 100 mcg/kg/min × 100.8 kg Intravenous Mitchel Hernandez RN    3/30/2025 1531 New Bag 75 mcg/kg/min × 100.8 kg Intravenous Mitchel Hernandez, CYNDIE          ezetimibe (Zetia) tab 10 mg       Date Action Dose Route User    3/30/2025 1746 Given 10 mg Oral Mitchel Hernandez RN       morphINE PF 2 MG/ML injection 2 mg       Date Action Dose Route User    3/31/2025 1244 Given 2 mg Intravenous Dianne Headley RN    3/31/2025 0819 Given 2 mg Intravenous Dianne Headley RN          morphINE PF 2 MG/ML injection       Date Action Dose Route User    3/31/2025 0819 Given 2 mg Intravenous Dianne Headley RN          niCARdipine in sodium chloride 0.86% (carDENE) 20 mg/200mL infusion premix       Date Action Dose Route User    3/31/2025 1526 New Bag 12.5 mg/hr Intravenous Dianne Headley RN    3/31/2025 1354 New Bag 12.5 mg/hr Intravenous Dianne Headley RN    3/31/2025 1214 New Bag 12.5 mg/hr Intravenous Dianne Headley RN    3/31/2025 1145 Rate/Dose Change 12.5 mg/hr Intravenous Dianne Headley RN    3/31/2025 1033 New Bag 10 mg/hr Intravenous David Snow, RN    3/31/2025 0836 New Bag 10 mg/hr Intravenous Dianne Headley RN    3/31/2025 0647 New Bag 10 mg/hr Intravenous aHrjit Fuller, RN    3/31/2025 0630 Rate/Dose Change 10 mg/hr Intravenous Harjit Fuller RN    3/31/2025 0600 Rate/Dose Verify 5 mg/hr Intravenous Harjit Fuller RN    3/31/2025 0400 Rate/Dose Verify 5 mg/hr Intravenous Harjit Fuller RN    3/31/2025 0310 New Bag 5 mg/hr Intravenous Harjit Fuller RN    3/31/2025 0204 Restarted 2.5 mg/hr Intravenous Harjit Fuller, RN     3/30/2025 2315 Rate/Dose Change 5 mg/hr Intravenous FullerHarjit, RN    3/30/2025 2217 New Bag 10 mg/hr Intravenous FullerHarjit, RN    3/30/2025 2200 Rate/Dose Verify 12.5 mg/hr Intravenous FullerHarjit, RN    3/30/2025 2155 Rate/Dose Change 12.5 mg/hr Intravenous FullerHarjit, RN    3/30/2025 2102 New Bag 15 mg/hr Intravenous FullerHarjit, RN    3/30/2025 2000 Rate/Dose Verify 15 mg/hr Intravenous FullerHarjit, RN    3/30/2025 1949 New Bag 15 mg/hr Intravenous FullerHarjit, RN    3/30/2025 1837 New Bag 15 mg/hr Intravenous Mitchel Hernandez, CYNDIE    3/30/2025 1737 New Bag 15 mg/hr Intravenous Mitchel Hernandez RN    3/30/2025 1728 New Bag 15 mg/hr Intravenous Mitchel Hernandez RN    3/30/2025 1635 Rate/Dose Change 15 mg/hr Intravenous Mitchel Hernandez RN    3/30/2025 1626 Rate/Dose Change 10 mg/hr Intravenous Mitchel Hernandez RN    3/30/2025 1615 New Bag 5 mg/hr Intravenous Mitchel Hernandez RN          pantoprazole (Protonix) 40 mg in sodium chloride 0.9% PF 10 mL IV push       Date Action Dose Route User    3/31/2025 1526 Given 40 mg Intravenous Dianne Headley RN    3/30/2025 1530 Given 40 mg Intravenous Mitchel Hernandez RN          potassium chloride 20 mEq/100mL IVPB premix 20 mEq       Date Action Dose Route User    3/31/2025 0002 New Bag 20 mEq Intravenous Harjit Fuller RN          potassium phosphate dibasic 15 mmol in sodium chloride 0.9% 250 mL IVPB       Date Action Dose Route User    3/30/2025 1826 New Bag 15 mmol Intravenous Mitchel Hernandez RN            Vitals (last day)       Date/Time Temp Pulse Resp BP SpO2 Weight O2 Device O2 Flow Rate (L/min) Who    03/31/25 1400 -- 59 9 -- 93 % -- -- -- LC    03/31/25 1300 -- 60 13 -- 92 % -- -- --     03/31/25 1200 97.4 °F (36.3 °C) 61 19 -- 94 % -- None (Room air) --     03/31/25 1100 -- 61 17 -- 95 % -- -- --     03/31/25 1000 -- 61 19 -- 95 % -- -- --     03/31/25 0900 -- 62 19 -- 97 % -- -- --     03/31/25 0814 -- -- -- -- 95 % --  None (Room air) -- AF    03/31/25 0800 97.1 °F (36.2 °C) 61 19 -- -- -- None (Room air) -- LC    03/31/25 0800 -- -- -- -- 97 % -- -- -- AF    03/31/25 0700 -- 60 15 -- 94 % -- -- -- TT    03/31/25 0600 -- 60 17 -- 97 % -- -- -- TT    03/31/25 0500 -- 60 11 -- 96 % -- -- -- TT    03/31/25 0400 97.8 °F (36.6 °C) 60 12 124/51 96 % 227 lb 11.8 oz (103.3 kg) CPAP -- TT    03/31/25 0300 -- 60 19 -- 97 % -- -- -- TT    03/31/25 0200 -- 61 13 -- 96 % -- -- -- TT    03/31/25 0100 -- 61 12 -- 95 % -- -- -- TT    03/31/25 0000 98.3 °F (36.8 °C) 61 15 122/49 94 % -- CPAP -- TT    03/30/25 2300 -- 62 14 -- 93 % -- -- -- TT    03/30/25 2203 -- 61 14 -- 96 % -- CPAP -- TT    03/30/25 2200 -- 60 12 -- 95 % -- -- -- TT    03/30/25 2100 -- 63 23 -- 95 % -- -- -- TT    03/30/25 2000 97 °F (36.1 °C) 63 20 112/46 95 % -- None (Room air) -- TT    03/30/25 1900 -- 64 22 -- 96 % -- -- -- TT    03/30/25 1830 -- 63 23 -- 93 % -- None (Room air) -- JK    03/30/25 1800 -- 64 17 -- 94 % -- None (Room air) -- JK    03/30/25 1730 -- 66 18 -- 97 % -- None (Room air) -- JK    03/30/25 1700 -- 68 15 -- 97 % -- None (Room air) --     03/30/25 1654 -- -- -- -- -- 222 lb 3.6 oz (100.8 kg) -- --     03/30/25 1630 -- 71 18 -- 97 % -- None (Room air) --     03/30/25 1600 -- 73 19 136/75 96 % -- None (Room air) --     03/30/25 1530 -- 72 20 127/75 97 % -- None (Room air) --     03/30/25 1508 97.7 °F (36.5 °C) -- 20 -- 97 % -- -- -- AG    03/30/25 1508 -- -- -- -- -- 222 lb 3.6 oz (100.8 kg) -- -- OSIEL    03/30/25 1508 -- 72 -- 122/72 -- -- None (Room air) --

## 2025-04-01 ENCOUNTER — ANESTHESIA EVENT (OUTPATIENT)
Dept: CARDIAC SURGERY | Facility: HOSPITAL | Age: 60
End: 2025-04-01
Payer: COMMERCIAL

## 2025-04-01 LAB
ALBUMIN SERPL-MCNC: 3.8 G/DL (ref 3.2–4.8)
ALBUMIN SERPL-MCNC: 3.9 G/DL (ref 3.2–4.8)
ALBUMIN/GLOB SERPL: 1.4 {RATIO} (ref 1–2)
ALBUMIN/GLOB SERPL: 1.4 {RATIO} (ref 1–2)
ALP LIVER SERPL-CCNC: 55 U/L
ALP LIVER SERPL-CCNC: 58 U/L
ALT SERPL-CCNC: <7 U/L
ALT SERPL-CCNC: <7 U/L
ANION GAP SERPL CALC-SCNC: 10 MMOL/L (ref 0–18)
ANION GAP SERPL CALC-SCNC: 7 MMOL/L (ref 0–18)
ANTIBODY SCREEN: NEGATIVE
APTT PPP: 31.1 SECONDS (ref 23–36)
AST SERPL-CCNC: 12 U/L (ref ?–34)
AST SERPL-CCNC: 9 U/L (ref ?–34)
BASE EXCESS BLDA CALC-SCNC: -11.3 MMOL/L (ref ?–2)
BASOPHILS # BLD AUTO: 0.02 X10(3) UL (ref 0–0.2)
BASOPHILS NFR BLD AUTO: 0.4 %
BILIRUB SERPL-MCNC: 0.2 MG/DL (ref 0.2–1.1)
BILIRUB SERPL-MCNC: 0.2 MG/DL (ref 0.2–1.1)
BODY TEMPERATURE: 98.6 F
BUN BLD-MCNC: 13 MG/DL (ref 9–23)
BUN BLD-MCNC: 14 MG/DL (ref 9–23)
CA-I BLD-SCNC: 1.3 MMOL/L (ref 0.95–1.32)
CALCIUM BLD-MCNC: 8.8 MG/DL (ref 8.7–10.6)
CALCIUM BLD-MCNC: 9 MG/DL (ref 8.7–10.6)
CHLORIDE SERPL-SCNC: 113 MMOL/L (ref 98–112)
CHLORIDE SERPL-SCNC: 114 MMOL/L (ref 98–112)
CO2 SERPL-SCNC: 14 MMOL/L (ref 21–32)
CO2 SERPL-SCNC: 18 MMOL/L (ref 21–32)
COHGB MFR BLD: 1.7 % SAT (ref 0–3)
CPAP: 4 CM H2O
CREAT BLD-MCNC: 1.54 MG/DL
CREAT BLD-MCNC: 1.7 MG/DL
EGFRCR SERPLBLD CKD-EPI 2021: 34 ML/MIN/1.73M2 (ref 60–?)
EGFRCR SERPLBLD CKD-EPI 2021: 38 ML/MIN/1.73M2 (ref 60–?)
EOSINOPHIL # BLD AUTO: 0.14 X10(3) UL (ref 0–0.7)
EOSINOPHIL NFR BLD AUTO: 2.8 %
ERYTHROCYTE [DISTWIDTH] IN BLOOD BY AUTOMATED COUNT: 16 %
ERYTHROCYTE [DISTWIDTH] IN BLOOD BY AUTOMATED COUNT: 16.1 %
GLOBULIN PLAS-MCNC: 2.7 G/DL (ref 2–3.5)
GLOBULIN PLAS-MCNC: 2.8 G/DL (ref 2–3.5)
GLUCOSE BLD-MCNC: 102 MG/DL (ref 70–99)
GLUCOSE BLD-MCNC: 107 MG/DL (ref 70–99)
GLUCOSE BLD-MCNC: 127 MG/DL (ref 70–99)
HCO3 BLDA-SCNC: 16 MEQ/L (ref 21–27)
HCT VFR BLD AUTO: 30 %
HCT VFR BLD AUTO: 31.6 %
HGB BLD-MCNC: 9.3 G/DL
HGB BLD-MCNC: 9.8 G/DL
HGB BLD-MCNC: 9.8 G/DL
IMM GRANULOCYTES # BLD AUTO: 0.02 X10(3) UL (ref 0–1)
IMM GRANULOCYTES NFR BLD: 0.4 %
INR BLD: 1.14 (ref 0.8–1.2)
LACTATE BLD-SCNC: 0.5 MMOL/L (ref 0.5–2)
LYMPHOCYTES # BLD AUTO: 1.48 X10(3) UL (ref 1–4)
LYMPHOCYTES NFR BLD AUTO: 29.7 %
MAGNESIUM SERPL-MCNC: 2.3 MG/DL (ref 1.6–2.6)
MCH RBC QN AUTO: 24.5 PG (ref 26–34)
MCH RBC QN AUTO: 24.6 PG (ref 26–34)
MCHC RBC AUTO-ENTMCNC: 31 G/DL (ref 31–37)
MCHC RBC AUTO-ENTMCNC: 31 G/DL (ref 31–37)
MCV RBC AUTO: 78.9 FL
MCV RBC AUTO: 79.4 FL
METHGB MFR BLD: 0.5 % SAT (ref 0.4–1.5)
MONOCYTES # BLD AUTO: 0.58 X10(3) UL (ref 0.1–1)
MONOCYTES NFR BLD AUTO: 11.6 %
NEUTROPHILS # BLD AUTO: 2.74 X10 (3) UL (ref 1.5–7.7)
NEUTROPHILS # BLD AUTO: 2.74 X10(3) UL (ref 1.5–7.7)
NEUTROPHILS NFR BLD AUTO: 55.1 %
OSMOLALITY SERPL CALC.SUM OF ELEC: 281 MOSM/KG (ref 275–295)
OSMOLALITY SERPL CALC.SUM OF ELEC: 293 MOSM/KG (ref 275–295)
OXYHGB MFR BLDA: 92.7 % (ref 92–100)
PCO2 BLDA: 27 MM HG (ref 35–45)
PH BLDA: 7.31 [PH] (ref 7.35–7.45)
PHOSPHATE SERPL-MCNC: 3.7 MG/DL (ref 2.4–5.1)
PLATELET # BLD AUTO: 228 10(3)UL (ref 150–450)
PLATELET # BLD AUTO: 228 10(3)UL (ref 150–450)
PO2 BLDA: 65 MM HG (ref 80–100)
POTASSIUM BLD-SCNC: 4.9 MMOL/L (ref 3.6–5.1)
POTASSIUM SERPL-SCNC: 4.3 MMOL/L (ref 3.5–5.1)
POTASSIUM SERPL-SCNC: 4.7 MMOL/L (ref 3.5–5.1)
PROT SERPL-MCNC: 6.5 G/DL (ref 5.7–8.2)
PROT SERPL-MCNC: 6.7 G/DL (ref 5.7–8.2)
PROTHROMBIN TIME: 14.8 SECONDS (ref 11.6–14.8)
RBC # BLD AUTO: 3.8 X10(6)UL
RBC # BLD AUTO: 3.98 X10(6)UL
RH BLOOD TYPE: POSITIVE
SODIUM BLD-SCNC: 134 MMOL/L (ref 135–145)
SODIUM SERPL-SCNC: 135 MMOL/L (ref 136–145)
SODIUM SERPL-SCNC: 141 MMOL/L (ref 136–145)
WBC # BLD AUTO: 5 X10(3) UL (ref 4–11)
WBC # BLD AUTO: 5.1 X10(3) UL (ref 4–11)

## 2025-04-01 PROCEDURE — 99233 SBSQ HOSP IP/OBS HIGH 50: CPT | Performed by: INTERNAL MEDICINE

## 2025-04-01 NOTE — PROGRESS NOTES
[facility]   part of [facility]     Vascular Surgery Progress Note    Alisha Wilde Patient Status:  Inpatient    10/25/1965 MRN [id number]   Location [facility] 4SW-A Attending Sumit Kapoor MD   Hosp Day # 2 PCP Bridger Avendano MD     Objective:   Temp: 98.4 °F (36.9 °C)  Pulse: 59  Resp: 19  AO: 130/56      Events Yesterday/Overnight:  Patient presents with an aortic dissection.  Planned left carotid to subclavian transposition with fenestrated arch repair, thoracic stent for dissection, left renal stent and bilateral iliac stent placements on  with Dr. Holder.  Patient states she feels dizzy and nauseous today.  No emesis.  Patient's blood pressure is 130/56.  Patient is on brevibloc and cardene.  Hemoglobin 9.3.      Exam:  Palpable right PT and DP.  Audible signals left DP and PT.  Pulses stronger in the right lower extremity compared to left.  Lower extremities warm to touch, patient able to move bilateral feet.  Lower extremity strength is greater in the right lower extremity compared to left.  Neurologically intact.    Impression/Plan:    Will continue to monitor patient's symptoms.  Proceed with planned surgical intervention tomorrow.    Dr. Holder to follow-up this afternoon.    Medications:  Current Facility-Administered Medications   Medication Dose Route Frequency    niCARdipine in sodium chloride 0.86% (carDENE) 20 mg/200mL infusion premix  5-15 mg/hr Intravenous Continuous    morphINE PF 2 MG/ML injection 2 mg  2 mg Intravenous Q1H PRN    acetaminophen (Tylenol Extra Strength) tab 500 mg  500 mg Oral Q4H PRN    melatonin tab 3 mg  3 mg Oral Nightly PRN    polyethylene glycol (PEG 3350) (Miralax) 17 g oral packet 17 g  17 g Oral Daily PRN    sennosides (Senokot) tab 17.2 mg  17.2 mg Oral Nightly PRN    bisacodyl (Dulcolax) 10 MG rectal suppository 10 mg  10 mg Rectal Daily PRN    fleet enema (Fleet) rectal enema 133 mL  1 enema Rectal Once PRN    pantoprazole (Protonix) 40 mg in  sodium chloride 0.9% PF 10 mL IV push  40 mg Intravenous Q24H    ALPRAZolam (Xanax) tab 0.5 mg  0.5 mg Oral TID PRN    ondansetron (Zofran) 4 MG/2ML injection 4 mg  4 mg Intravenous Q6H PRN    prochlorperazine (Compazine) 10 MG/2ML injection 5 mg  5 mg Intravenous Q8H PRN    ezetimibe (Zetia) tab 10 mg  10 mg Oral Daily    esmolol (Brevibloc) 2000 mg/100mL infusion premix   mcg/kg/min Intravenous Continuous       Laboratory/Data:    LABS:  Recent Labs   Lab 03/30/25 0757 03/30/25 1631 03/30/25 1632 03/31/25 0320 04/01/25  0449   WBC 7.9  --  6.4 5.1 5.0   HGB 9.8*  --  10.0* 9.4* 9.3*   MCV 79.5*  --  78.4* 79.1* 78.9*   .0  --  268.0 249.0 228.0   INR 1.04 1.14  --  1.15  --        Recent Labs   Lab 03/30/25 0757 03/30/25 1631 03/31/25 0320 04/01/25  0449    145 138 141   K 4.1 3.2* 4.6  4.6 4.3    116* 108 113*   CO2 22.0 18.0* 20.0* 18.0*   BUN 16 12 14 13   CREATSERUM 1.37* 0.92 1.39* 1.54*   * 84 99 107*   CA 9.4 7.4* 9.1 9.0   MG  --   --   --  2.3   PHOS  --  2.5 4.5 3.7     Recent Labs   Lab 03/30/25 0757 03/30/25 1631 03/31/25  0320   PTP 14.2 14.7 14.8   INR 1.04 1.14 1.15   PTT 29.6  --   --      Recent Labs   Lab 03/30/25 1631 03/31/25 0320 04/01/25  0449   ALT <7* <7* <7*   AST <8 <8 9   ALB 3.3 3.9 3.8     No results for input(s): \"TROP\" in the last 168 hours.  Lab Results   Component Value Date    ANASCRN Negative 06/05/2021     No results for input(s): \"PCACT\", \"PSACT\", \"AT3ACT\", \"HIPAB\", \"PATHI\", \"STALA\", \"DRVVTRATIO\", \"DRVVT\", \"STACLOT\", \"CARIGG\", \"W7GD6JKJAJ\", \"V7UC7AJWON\", \"RA\", \"HAVIGM\", \"HBCIGM\", \"HCVAB\", \"HBSAG\", \"HBCAB\", \"HBVDNAINTERP\", \"ANAS\", \"C3\", \"C4\" in the last 168 hours.    STACEY Murphy  4/1/2025  8:23 AM

## 2025-04-01 NOTE — PLAN OF CARE
Received patient following report. Alert and oriented. FC and PRECIADO. Baseline tingling to LLE. PRN xanax given for anxiety. RA, CPAP at [facility]. NSR. Esmolol and cardene gtts titrated per orders. Up to commode. Morphine given for pain. See flowsheets and MAR.

## 2025-04-01 NOTE — PLAN OF CARE
Assumed care of pt at the change of shift. Pt A&Ox4, follows commands. On 2LNC and tolerating well. No complaints of pain. Complaints of nausea & dizziness- vascular aprn notified. Prn medication given for nausea. Prn medication given for anxiety. Course of treatment remains the same. Remains on cardene and esmolol gtts- titrated for sbp < 130. Voiding via commode. Poor appetite. Dr. Holder  made aware of low urine output- evening labs ordered. See flowsheets for further information.

## 2025-04-01 NOTE — PROGRESS NOTES
[facility]  Pulmonary/Critical Care Progress note    Alisha Wilde Patient Status:  Inpatient    10/25/1965 MRN [id number]   Location [facility] 4SW-A Attending Sumit Kapoor MD   Hosp Day # 2 PCP Bridger Avendano MD       History of Present Illness:  Breathing well this morning denies any chest pains or abdominal pains.    History:  Past Medical History:    Chronic kidney disease (CKD)    Esophageal reflux    High blood pressure    High cholesterol    HYPERTENSION    Hypertension    Unspecified essential hypertension     Past Surgical History:   Procedure Laterality Date    Anesth,open heart surgery  2024    Colonoscopy N/A 2018    Procedure: COLONOSCOPY;  Surgeon: Magdy Webber MD;  Location: [facility] ENDOSCOPY    Endometrial ablation      child passed away for 5 mins          1    Other surgical history  2011    cysto-Dr. Lacey -- pt denies     Family History   Problem Relation Age of Onset    Hypertension Mother     Heart Disorder Mother 70    Other (Other) Mother         kidney failure    Hypertension Father     Hypertension Maternal Grandfather     Cancer Neg     Diabetes Neg     Glaucoma Neg     Macular degeneration Neg       reports that she quit smoking about 26 years ago. Her smoking use included cigarettes. She started smoking about 39 years ago. She has a 13 pack-year smoking history. She has quit using smokeless tobacco. She reports that she does not currently use alcohol after a past usage of about 1.0 - 2.0 standard drink of alcohol per week. She reports that she does not use drugs.    Allergies:  Allergies[1]    Medications:    Current Facility-Administered Medications:     niCARdipine in sodium chloride 0.86% (carDENE) 20 mg/200mL infusion premix, 5-15 mg/hr, Intravenous, Continuous    morphINE PF 2 MG/ML injection 2 mg, 2 mg, Intravenous, Q1H PRN    acetaminophen (Tylenol Extra Strength) tab 500 mg, 500 mg, Oral, Q4H PRN    melatonin tab 3 mg, 3 mg,  Oral, Nightly PRN    polyethylene glycol (PEG 3350) (Miralax) 17 g oral packet 17 g, 17 g, Oral, Daily PRN    sennosides (Senokot) tab 17.2 mg, 17.2 mg, Oral, Nightly PRN    bisacodyl (Dulcolax) 10 MG rectal suppository 10 mg, 10 mg, Rectal, Daily PRN    fleet enema (Fleet) rectal enema 133 mL, 1 enema, Rectal, Once PRN    pantoprazole (Protonix) 40 mg in sodium chloride 0.9% PF 10 mL IV push, 40 mg, Intravenous, Q24H    ALPRAZolam (Xanax) tab 0.5 mg, 0.5 mg, Oral, TID PRN    ondansetron (Zofran) 4 MG/2ML injection 4 mg, 4 mg, Intravenous, Q6H PRN    prochlorperazine (Compazine) 10 MG/2ML injection 5 mg, 5 mg, Intravenous, Q8H PRN    ezetimibe (Zetia) tab 10 mg, 10 mg, Oral, Daily    esmolol (Brevibloc) 2000 mg/100mL infusion premix,  mcg/kg/min, Intravenous, Continuous    Intake/Output:    Intake/Output Summary (Last 24 hours) at 4/1/2025 0807  Last data filed at 4/1/2025 0552  Gross per 24 hour   Intake 1908 ml   Output 700 ml   Net 1208 ml      Body mass index is 37.9 kg/m².    Review of Systems  Review of Systems:   A comprehensive 10 point review of systems was completed.  Pertinent positives and negatives noted in the the HPI.       Patient Vitals for the past 24 hrs:   Temp Temp src Pulse Resp SpO2   04/01/25 0700 -- -- 59 19 94 %   04/01/25 0600 -- -- 58 14 94 %   04/01/25 0500 -- -- 56 18 95 %   04/01/25 0400 97.5 °F (36.4 °C) Temporal 57 12 94 %   04/01/25 0300 -- -- 56 15 94 %   04/01/25 0200 -- -- 55 13 95 %   04/01/25 0100 -- -- 55 12 95 %   04/01/25 0000 98 °F (36.7 °C) Temporal 56 17 94 %   03/31/25 2300 -- -- 56 10 93 %   03/31/25 2200 -- -- 57 16 91 %   03/31/25 2100 -- -- 57 18 93 %   03/31/25 2000 -- -- 58 17 92 %   03/31/25 1930 97.7 °F (36.5 °C) Temporal 58 19 93 %   03/31/25 1900 -- -- 58 19 92 %   03/31/25 1800 -- -- 59 19 94 %   03/31/25 1700 -- -- 59 14 94 %   03/31/25 1600 98 °F (36.7 °C) Temporal 60 16 92 %   03/31/25 1500 -- -- 59 17 92 %   03/31/25 1400 -- -- 59 (!) 9 93 %    03/31/25 1300 -- -- 60 13 92 %   03/31/25 1200 97.4 °F (36.3 °C) Temporal 61 19 94 %   03/31/25 1100 -- -- 61 17 95 %   03/31/25 1000 -- -- 61 19 95 %   03/31/25 0900 -- -- 62 19 97 %   03/31/25 0814 -- -- -- -- 95 %     Vitals:    04/01/25 0400 04/01/25 0500 04/01/25 0600 04/01/25 0700   BP:       BP Location:       Pulse: 57 56 58 59   Resp: 12 18 14 19   Temp: 97.5 °F (36.4 °C)      TempSrc: Temporal      SpO2: 94% 95% 94% 94%   Weight:             Physical Exam  Constitutional:       General: She is not in acute distress.     Appearance: Normal appearance. She is obese. She is not ill-appearing or diaphoretic.   HENT:      Head: Normocephalic and atraumatic.      Nose: Nose normal. No congestion or rhinorrhea.      Mouth/Throat:      Mouth: Mucous membranes are moist.      Pharynx: Oropharynx is clear. No oropharyngeal exudate or posterior oropharyngeal erythema.      Comments: Mallampati class IV palate   Eyes:      Extraocular Movements: Extraocular movements intact.      Pupils: Pupils are equal, round, and reactive to light.   Cardiovascular:      Rate and Rhythm: Normal rate.      Pulses: Normal pulses.      Heart sounds: Normal heart sounds. No murmur heard.  Pulmonary:      Effort: Pulmonary effort is normal. No respiratory distress.      Breath sounds: Normal breath sounds. No wheezing or rhonchi.      Comments: Diminished breath sound bilaterally  Chest:      Chest wall: No tenderness.   Abdominal:      General: Abdomen is flat. Bowel sounds are normal.      Palpations: Abdomen is soft.   Musculoskeletal:         General: Normal range of motion.      Comments: SCD boots to lower extremities   Skin:     General: Skin is warm.   Neurological:      General: No focal deficit present.      Mental Status: She is alert and oriented to person, place, and time.   Psychiatric:         Mood and Affect: Mood normal.         Behavior: Behavior normal.         Thought Content: Thought content normal.          Judgment: Judgment normal.            Lab Data Review:  Recent Labs   Lab 03/30/25  1632 03/31/25  0320 04/01/25  0449   WBC 6.4 5.1 5.0   HGB 10.0* 9.4* 9.3*   HCT 31.5* 30.6* 30.0*   .0 249.0 228.0       Recent Labs   Lab 03/30/25  1631 03/31/25  0320 04/01/25  0449    138 141   K 3.2* 4.6  4.6 4.3   * 108 113*   CO2 18.0* 20.0* 18.0*   BUN 12 14 13   CREATSERUM 0.92 1.39* 1.54*   CA 7.4* 9.1 9.0   ALB 3.3 3.9 3.8   ALKPHO 46* 55 55   ALT <7* <7* <7*   AST <8 <8 9   GLU 84 99 107*       Recent Labs   Lab 04/01/25  0449   MG 2.3       Lab Results   Component Value Date    PHOS 3.7 04/01/2025        Recent Labs   Lab 03/30/25  0757 03/30/25  1631 03/31/25  0320   INR 1.04 1.14 1.15   PTT 29.6  --   --        No results for input(s): \"ABGPHT\", \"MIBEQH0G\", \"LACNR2D\", \"ABGHCO3\", \"ABGBE\", \"TEMP\", \"ANDIE\", \"SITE\", \"DEV\", \"THGB\" in the last 168 hours.    No results for input(s): \"TROP\", \"CKMB\" in the last 168 hours.    Cultures: No results found for this visit on 03/30/25.        Radiology personally reviewed:  CTA CHEST CTA ABDOMEN CTA PELVIS (CPT=71275/71091)    Result Date: 3/31/2025  CONCLUSION:  1. Type B aortic dissection extends from the aortic arch at the level of the right innominate artery through the abdominal aorta with extension into the proximal left renal artery with moderate narrowing of the true lumen of the left renal artery.  The inferior mesenteric artery arises from the opacified false lumen.  The dissection also extends through the aortic bifurcation with termination in the proximal right common iliac artery and mid left external iliac artery.  There is marked narrowing of the  true lumen of the proximal left external iliac artery at distal level of the dissection with suspected thrombosis of the false lumen.  Early contrast opacification of the false lumen proximally and in the abdominal aorta indicates flow through the intimal flap at these levels.  There is stable moderate to  marked narrowing of the true lumen of the thoracic and abdominal aorta.  There is stable dilatation of the aortic arch measuring up to 5 cm. 2. Stable postsurgical changes of repair of ascending aorta.  New line interval development of small bilateral pleural effusions and mild dependent atelectasis. 3. Uncomplicated diverticula of left colon.   LOCATION:  [facility]   Dictated by (CST): Osito Skinner MD on 3/31/2025 at 10:10 AM     Finalized by (CST): Osito Skinner MD on 3/31/2025 at 10:42 AM       CTA CHEST+CTA ABDOMEN DISSECT SET (CPT=71275/63230)    Result Date: 3/30/2025  CONCLUSION:   Type A extending aortic dissection, status post aneurysm repair with ascending aortic stent graft.  Partially thrombosed ascending thoracic aortic aneurysm measures 4.5 cm, probably unchanged from 11/26/2024 noncontrast CT.  Correlate with more recent prior outside imaging to assess stability of this finding.  Progressed/new type B aortic dissection with dissection flap extending from the distal aortic arch to the bilateral common iliac arteries and the right external iliac artery to the edge of the field of view of this examination.  Associated areas of moderate and severe narrowing of the true lumen of the thoracic and abdominal aorta.  The left renal artery and celiac trunk arise from the false lumen.  Dissection flap extends to the left renal artery by approximately 9 mm. Recommend thoracic/vascular surgery consultation.  For reference, the proximal descending thoracic aorta measures 5.0 cm, previously 3.9 cm on 11/26/2024.    Findings were discussed with Dr Hayes  in the emergency room on 03/30/2025 at 9:27 a.m..   Dictated by (CST): Karen Ureña MD on 3/30/2025 at 9:13 AM     Finalized by (CST): Karen Ureña MD on 3/30/2025 at 9:32 AM            Patient Active Problem List   Diagnosis    Hypercholesteremia    Benign essential HTN    Vitamin D deficiency    Adjustment disorder with mixed anxiety and depressed mood     Controlled type 2 diabetes mellitus without complication, without long-term current use of insulin (HCC)    Obesity (BMI 30-39.9)    Polyp of colon    Flat feet, bilateral    Myalgia due to statin    Dissection of ascending aorta (HCC)    Stage 3 chronic kidney disease (HCC)    Iron deficiency    Constipation    Leg swelling    Gastroesophageal reflux disease    Dissection of aorta, unspecified portion of aorta (HCC)     59-year-old female presented to the emergency room at [facility] on 3/30/2025 with history of type a aortic dissection s/p repair on 11/21/2024 with complicated course with respiratory failure and renal failure required prolonged intubation and then trach and PEG and subsequently did well in rehab and she was decannulated and also PEG tube was removed  Patient also has history of TANYA, HTN, HL, CKD , obesity with a BMI of 35, GERD     Assessment/plan:    Pulmonary:  Obstructive sleep apnea syndrome  Severe obesity BMI 36    Plan:  Continue CPAP at bedtime and as needed    Cardiovascular:  Type B aortic dissection  Aneurysmal degeneration of zone 3  Hypertension  Sinus bradycardia cardene and esmolool   History of type A aortic dissection status postrepair in November 2024 at [facility] complicated by respiratory failure requiring tracheostomy and PEG tube placement.    Plan:  Vascular surgery plans left carotid to subclavian transposition, fenestrated arch repair and thoracic stent for dissection along with left renal stent and bilateral iliac stent placement today    Hematologic/coagulation  Anemia    Plan:  Monitor hemoglobin    Renal/FEN  Left renal artery dissection  Chronic kidney disease stage III    Plan  left renal stent and bilateral iliac stent placement today    Endocrine/DEM  Dyslipidemia    Plan:  Continue Zetia 10 mg oral daily    Gastroenterology/hepatology   GERD    Plan:  N.p.o. for now  Protonix 40 mg IV twice daily    Infectious disease:  No acute  disease    Plan:  Monitor    Neurologic/psychiatry  No acute disease    Plan:  Monitor    Musculoskeletal/rheumatology  No acute disease    Plan:  Monitor      Prophylaxis:    DVT prophylaxis: SCD boots   GI prophylaxis: Protonix 40 mg  twice daily    Lines:  PIV  Catheters:  Feeding:     Disposition:  ICU monitoring       CODE STATUS: Full code           Mj Guerra MD       Note to the patient: The 21st Century Cures Act makes medical notes like these available to patients in the interest of transparency. However, be advised that this is a medical document. It is intended as peer to peer communication. It is written in medical language and may contain abbreviations or verbiage that are unfamiliar. It may appear blunt or direct. Medical documents are intended to carry relevant information, facts as evident, and clinical opinion of the practitioner.      Disclaimer: Components of this note were documented using voice recognition system and are subject to errors not corrected at proofreading. Contact the author of this note for any clarifications           [1]   Allergies  Allergen Reactions    Atorvastatin MYALGIA    Pravastatin MYALGIA    Rosuvastatin MYALGIA    Seasonal Runny nose

## 2025-04-01 NOTE — ANESTHESIA PREPROCEDURE EVALUATION
PRE-OP EVALUATION    Patient Name: Alisha Wilde    Admit Diagnosis: Dissection of aorta [I71.0]    Pre-op Diagnosis: arch dissection with aneurysm    LEFT CAROTID TO SUBCLAVIAN TRANSPOSITION, ENDOVASCULAR ARCH REPAIR. PHYSICIAN MODIFIED GRAFT. POSSIBLE LEFT FEMORAL CUT DOWN. WITH NEUROMONITORING.    Anesthesia Procedure: LEFT CAROTID TO SUBCLAVIAN TRANSPOSITION, ENDOVASCULAR ARCH REPAIR. PHYSICIAN MODIFIED GRAFT. POSSIBLE LEFT FEMORAL CUT DOWN. WITH NEUROMONITORING. (Left)  ANGIOGRAM ADD-ON (Left)    Surgeons and Role:     * Frank Holder MD - Primary    Pre-op vitals reviewed.  Temp: 98.4 °F (36.9 °C)  Pulse: 54  Resp: 14  AO: 117/50  SpO2: 89 %  Body mass index is 37.9 kg/m².    Current medications reviewed.  Hospital Medications:   niCARdipine in sodium chloride 0.86% (carDENE) 20 mg/200mL infusion premix  5-15 mg/hr Intravenous Continuous    morphINE PF 2 MG/ML injection 2 mg  2 mg Intravenous Q1H PRN    [COMPLETED] iopamidol 76% (ISOVUE-370) injection for power injector  100 mL Intravenous ONCE PRN    [COMPLETED] iopamidol 76% (ISOVUE-370) injection for power injector  80 mL Intravenous ONCE PRN    [] nitroGLYCERIN in dextrose 5% 50 mg/250mL infusion premix        acetaminophen (Tylenol Extra Strength) tab 500 mg  500 mg Oral Q4H PRN    melatonin tab 3 mg  3 mg Oral Nightly PRN    polyethylene glycol (PEG 3350) (Miralax) 17 g oral packet 17 g  17 g Oral Daily PRN    sennosides (Senokot) tab 17.2 mg  17.2 mg Oral Nightly PRN    bisacodyl (Dulcolax) 10 MG rectal suppository 10 mg  10 mg Rectal Daily PRN    fleet enema (Fleet) rectal enema 133 mL  1 enema Rectal Once PRN    pantoprazole (Protonix) 40 mg in sodium chloride 0.9% PF 10 mL IV push  40 mg Intravenous Q24H    [COMPLETED] lidocaine (Xylocaine) 1 % injection  10 mL Intradermal Once    ALPRAZolam (Xanax) tab 0.5 mg  0.5 mg Oral TID PRN    ondansetron (Zofran) 4 MG/2ML injection 4 mg  4 mg Intravenous Q6H PRN    prochlorperazine (Compazine) 10  MG/2ML injection 5 mg  5 mg Intravenous Q8H PRN    ezetimibe (Zetia) tab 10 mg  10 mg Oral Daily    [COMPLETED] potassium phosphate dibasic 15 mmol in sodium chloride 0.9% 250 mL IVPB  15 mmol Intravenous Once    Followed by    [COMPLETED] potassium chloride 20 mEq/100mL IVPB premix 20 mEq  20 mEq Intravenous Once    esmolol (Brevibloc) 2000 mg/100mL infusion premix   mcg/kg/min Intravenous Continuous       Outpatient Medications:   Prescriptions Prior to Admission[1]    Allergies: Atorvastatin, Pravastatin, Rosuvastatin, and Seasonal      Anesthesia Evaluation    Patient summary reviewed.    Anesthetic Complications           GI/Hepatic/Renal      (+) GERD       (+) chronic renal disease and CRI                   Cardiovascular  Comment: Prior type A dissection repair 2024, now with type B dissection s/p tracheostomy during that hospitalization, now reversed              (+) obesity  (+) hypertension   (+) hyperlipidemia                                  Endo/Other      (+) diabetes  type 2,                          Pulmonary                           Neuro/Psych                              Currently in ICU on esmolol/nicardipine for impulse control; PIV and right radial arterial line in-situ with good waveform    Previous Type A dissection s/p repair complicated by respiratory failure requiring trach and peg now decannulated        Past Surgical History:   Procedure Laterality Date    Anesth,open heart surgery  2024    Colonoscopy N/A 2018    Procedure: COLONOSCOPY;  Surgeon: Magdy Webber MD;  Location: [facility] ENDOSCOPY    Endometrial ablation      child passed away for 5 mins          1    Other surgical history  2011    cysto-Dr. Lacey -- pt denies     Social History     Socioeconomic History    Marital status: Single   Tobacco Use    Smoking status: Former     Current packs/day: 0.00     Average packs/day: 1 pack/day for 13.0 years (13.0 ttl pk-yrs)     Types:  Cigarettes     Start date:      Quit date:      Years since quittin.2    Smokeless tobacco: Former   Vaping Use    Vaping status: Never Used   Substance and Sexual Activity    Alcohol use: Not Currently     Alcohol/week: 1.0 - 2.0 standard drink of alcohol     Types: 1 - 2 Cans of beer per week     Comment: every day  NOT DRINKING FOR LAS 4 MONTHS    Drug use: No     History   Drug Use No     Available pre-op labs reviewed.  Lab Results   Component Value Date    WBC 5.0 2025    RBC 3.80 2025    HGB 9.3 (L) 2025    HCT 30.0 (L) 2025    MCV 78.9 (L) 2025    MCH 24.5 (L) 2025    MCHC 31.0 2025    RDW 16.1 2025    .0 2025     Lab Results   Component Value Date     2025    K 4.3 2025     (H) 2025    CO2 18.0 (L) 2025    BUN 13 2025    CREATSERUM 1.54 (H) 2025     (H) 2025    CA 9.0 2025     Lab Results   Component Value Date    INR 1.15 2025          ASA: 4   Plan: general  NPO status verified and     Post-procedure pain management plan discussed with surgeon and patient.    Comment:    I explained intrinsic risks of general anesthesia, including nausea, dental damage, sore throat, mouth injury,and hoarseness from airway management. Discussed possible need (and associated risks/benefits) for spinal drain placement. Discussed possible need for and risks/benefits of central venous access.  All questions were answered and understanding was demonstrated of risks.  Informed permission was obtained to proceed as documented in the signed consent form.      Plan/risks discussed with: patient  Use of blood product(s) discussed with: patient    Consented to blood products.          Present on Admission:   Benign essential HTN             [1]   Medications Prior to Admission   Medication Sig Dispense Refill Last Dose/Taking    cloNIDine 0.3 MG/24HR Transdermal Patch Weekly Place 1 patch  onto the skin once a week. Pt reports applying on Saturdays   Past Week    acetaminophen 500 MG Oral Tab Take 2 tablets (1,000 mg total) by mouth every 6 (six) hours as needed for Pain.   3/29/2025    aspirin 81 MG Oral Tab EC Take 1 tablet (81 mg total) by mouth daily.   3/29/2025    thiamine 100 MG Oral Tab Take 1 tablet (100 mg total) by mouth daily.   3/29/2025    ezetimibe 10 MG Oral Tab Take 1 tablet (10 mg total) by mouth daily.   3/29/2025 Morning    hydrALAZINE 50 MG Oral Tab Take 1 tablet (50 mg total) by mouth 3 (three) times daily. 270 tablet 1 3/30/2025 Morning    pantoprazole 40 MG Oral Tab EC Take 1 tablet (40 mg total) by mouth every morning before breakfast. 90 tablet 3 Past Week    bisacodyl 5 MG Oral Tab EC Take 1 tablet (5 mg total) by mouth daily as needed. 30 tablet 0 Past Week    docusate sodium (COLACE) 100 MG Oral Cap Take 1 capsule (100 mg total) by mouth 2 (two) times daily as needed for constipation. 180 capsule 1 3/29/2025    Ferrous Gluconate 324 (37.5 Fe) MG Oral Tab Take 1 tablet (324 mg total) by mouth daily. 90 tablet 1 3/29/2025 Morning    carvedilol 25 MG Oral Tab Take 1.5 tablets (37.5 mg total) by mouth daily.   3/29/2025 Morning    azelastine 0.1 % Nasal Solution 1 spray by Nasal route 2 (two) times daily. (Patient taking differently: 1 spray by Nasal route daily as needed for Rhinitis.) 3 each 1 Past Week    nystatin [id number] UNIT/GM External Powder Apply 1 Application topically 3 (three) times daily. 60 g 2 3/29/2025 Morning    mirtazapine 7.5 MG Oral Tab 1 tablet (7.5 mg total) by Per G Tube route nightly. (Patient taking differently: 1 tablet (7.5 mg total) by Per G Tube route as needed.) 30 tablet 0 Past Week    amLODIPine 10 MG Oral Tab Take 1 tablet (10 mg total) by mouth daily. 90 tablet 3 3/29/2025 Morning

## 2025-04-01 NOTE — PAYOR COMM NOTE
--------------  3/31- 4/1 CONTINUED STAY REVIEW    Payor: [employer] CHOICE/HMO/POS/EPO  Subscriber #:  [id number]  Authorization Number: [id number]    REMAINS IN ICU    3/31:    VASCULAR:    CT reviewed   As suspected left external iliac artery with significant stenosis from false lumen   Discussed plan   Surgical plan - left carotid to subclavian transposition, fenestrated arch repair, thoracic stent for dissection, left renal stent and bilateral ilaic stent placement     4/1:    VASCULAR:    Objective:   Temp: 98.4 °F (36.9 °C)  Pulse: 59  Resp: 19  AO: 130/56        Events Yesterday/Overnight:  Patient presents with an aortic dissection.  Planned left carotid to subclavian transposition with fenestrated arch repair, thoracic stent for dissection, left renal stent and bilateral iliac stent placements on 4/2 with Dr. Holder.  Patient states she feels dizzy and nauseous today.  No emesis.  Patient's blood pressure is 130/56.  Patient is on brevibloc and cardene.  Hemoglobin 9.3.        Exam:  Palpable right PT and DP.  Audible signals left DP and PT.  Pulses stronger in the right lower extremity compared to left.  Lower extremities warm to touch, patient able to move bilateral feet.  Lower extremity strength is greater in the right lower extremity compared to left.  Neurologically intact.     Impression/Plan:     Will continue to monitor patient's symptoms.  Proceed with planned surgical intervention tomorrow.     Dr. Holder to follow-up this afternoon.     Medications:  Current Hospital Medications          Current Facility-Administered Medications   Medication Dose Route Frequency    niCARdipine in sodium chloride 0.86% (carDENE) 20 mg/200mL infusion premix  5-15 mg/hr Intravenous Continuous    pantoprazole (Protonix) 40 mg in sodium chloride 0.9% PF 10 mL IV push  40 mg Intravenous Q24H    ezetimibe (Zetia) tab 10 mg  10 mg Oral Daily    esmolol (Brevibloc) 2000 mg/100mL infusion premix   mcg/kg/min  Intravenous Continuous            Lab 03/30/25  0757 03/30/25  1631 03/30/25  1632 03/31/25 0320 04/01/25  0449   WBC 7.9  --  6.4 5.1 5.0   HGB 9.8*  --  10.0* 9.4* 9.3*   MCV 79.5*  --  78.4* 79.1* 78.9*   .0  --  268.0 249.0 228.0   INR 1.04 1.14  --  1.15  --       Lab 03/30/25  0757 03/30/25  1631 03/31/25 0320 04/01/25  0449    145 138 141   K 4.1 3.2* 4.6  4.6 4.3    116* 108 113*   CO2 22.0 18.0* 20.0* 18.0*   BUN 16 12 14 13   CREATSERUM 1.37* 0.92 1.39* 1.54*   * 84 99 107*   CA 9.4 7.4* 9.1 9.0   MG  --   --   --  2.3   PHOS  --  2.5 4.5 3.7      Lab 03/30/25  1631 03/31/25 0320 04/01/25  0449   ALT <7* <7* <7*   AST <8 <8 9   ALB 3.3 3.9 3.8           HOSPITALIST:    Patient without acute events overnight. Pt without any new pain. No CP or SOB. Plan for surgery tmrw. Has some nausea today.       Vital signs:  Temp:  [97.4 °F (36.3 °C)-98.4 °F (36.9 °C)] 98.4 °F (36.9 °C)  Pulse:  [50-61] 50  Resp:  [9-19] 14  SpO2:  [88 %-95 %] 88 %  AO: (117-133)/(46-56) 120/52     Physical Exam:    General: No acute distress  Respiratory: No wheezes, no rhonchi  Cardiovascular: S1, S2, regular rate and rhythm  Abdomen: Soft, Non-tender, non-distended, positive bowel sounds  Neuro: No new focal deficits.   Extremities: No edema       Assessment & Plan:  #Type B aortic Dissection  #L renal artery dissection  Monitor in ICU  BP control  STAT CTA chest/abd/pelvis now  Continue cardene and esmolol gtt  Vascular surgery following  Awaiting decision on lumbar drain placement  Goal SBP <130, monitor closely given symptoms of CP and back pain  Pain control with PRN moprhine  Tentatively scheduled for OR 4/2     #Prior hx of Type A aortic dissection  S/p repair 11/2024  Post op complications with rep failure and NAIMA needing trach and peg that are now removed     #Ess HTN  Resume meds  BP control    #Dyslipidemia  zetia     #CKD3     #TANYA     #Obesity, BMI 36      MEDICATIONS ADMINISTERED IN LAST 1  DAY:    esmolol (Brevibloc) 2000 mg/100mL infusion premix       Date Action Dose Route User    4/1/2025 1428 New Bag 200 mcg/kg/min × 100.8 kg Intravenous Beatrice Remy, RN    4/1/2025 1226 New Bag 200 mcg/kg/min × 100.8 kg Intravenous Beatrice Remy, RN    4/1/2025 1124 Rate/Dose Change 200 mcg/kg/min × 100.8 kg Intravenous Beatrice Remy, RN    4/1/2025 1112 New Bag 225 mcg/kg/min × 100.8 kg Intravenous SandritaBeatrice de luna, RN    4/1/2025 0950 New Bag 225 mcg/kg/min × 100.8 kg Intravenous Beatrice Remy, RN    4/1/2025 0823 New Bag 225 mcg/kg/min × 100.8 kg Intravenous SandritaBeatrice de luna, RN    4/1/2025 0736 New Bag 225 mcg/kg/min × 100.8 kg Intravenous Komal Henriquez, CYNDIE    4/1/2025 0702 Rate/Dose Change 225 mcg/kg/min × 100.8 kg Intravenous Komal Henriquez, RN    4/1/2025 0551 New Bag 200 mcg/kg/min × 100.8 kg Intravenous Komal Henriquez, CYNDIE    4/1/2025 0304 New Bag 175 mcg/kg/min × 100.8 kg Intravenous Komal Henriquez, CYNDIE    4/1/2025 0201 New Bag 175 mcg/kg/min × 100.8 kg Intravenous Komal Henriquez, CYNDIE    4/1/2025 0015 New Bag 200 mcg/kg/min × 100.8 kg Intravenous Komal Henriquez, RN    3/31/2025 2247 New Bag 200 mcg/kg/min × 100.8 kg Intravenous Komal Henriquez, CYNDIE    3/31/2025 2109 New Bag 200 mcg/kg/min × 100.8 kg Intravenous Komal Henriquez, CYNDIE    3/31/2025 1948 New Bag 200 mcg/kg/min × 100.8 kg Intravenous Komal Henriquez, RN    3/31/2025 1807 New Bag 200 mcg/kg/min × 100.8 kg Intravenous Dianne Headley RN    3/31/2025 1630 New Bag 200 mcg/kg/min × 100.8 kg Intravenous Dianne Headley RN          ezetimibe (Zetia) tab 10 mg       Date Action Dose Route User    4/1/2025 0817 Given 10 mg Oral Beatrice Remy RN          morphINE PF 2 MG/ML injection 2 mg       Date Action Dose Route User    3/31/2025 1941 Given 2 mg Intravenous Komal Henriquez, RN          niCARdipine in sodium chloride 0.86% (carDENE) 20 mg/200mL infusion premix       Date Action Dose Route User     4/1/2025 0206 New Bag 10 mg/hr Intravenous Komal Henriquez, CYNDIE    4/1/2025 0015 New Bag 12.5 mg/hr Intravenous Komal Henriquez, RN    3/31/2025 2313 New Bag 12.5 mg/hr Intravenous Komal Henriquez, RN    3/31/2025 2139 New Bag 12.5 mg/hr Intravenous Komal Henriquez, CYNDIE    3/31/2025 2008 New Bag 12.5 mg/hr Intravenous Komal Henriquez, RN    3/31/2025 1842 New Bag 12.5 mg/hr Intravenous EagleDanikaJuliana    3/31/2025 1704 New Bag 12.5 mg/hr Intravenous Dianne Headley, CYNDIE    3/31/2025 1526 New Bag 12.5 mg/hr Intravenous Dianne Headley, CYNDIE          niCARdipine in sodium chloride 0.86% (carDENE) 20 mg/200mL infusion premix       Date Action Dose Route User    4/1/2025 1330 New Bag 15 mg/hr Intravenous Beatrice Remy, RN    4/1/2025 1223 New Bag 15 mg/hr Intravenous Beatrice Remy, RN    4/1/2025 1112 New Bag 15 mg/hr Intravenous Beatrice Remy, RN    4/1/2025 0950 New Bag 15 mg/hr Intravenous Beatrice Remy, RN    4/1/2025 0823 New Bag 15 mg/hr Intravenous Beatrice Remy, RN    4/1/2025 0736 New Bag 15 mg/hr Intravenous Komal Henriquez, RN    4/1/2025 0640 Rate/Dose Change 15 mg/hr Intravenous Komal Henriquez, CYNDIE    4/1/2025 0619 Rate/Dose Change 12.5 mg/hr Intravenous Komal Henriquez, RN    4/1/2025 0552 New Bag 10 mg/hr Intravenous Komal Henriquez, RN    4/1/2025 0304 New Bag 10 mg/hr Intravenous Komal Henriquez, RN          ondansetron (Zofran) 4 MG/2ML injection 4 mg       Date Action Dose Route User    4/1/2025 0808 Given 4 mg Intravenous Sandrita, Beatrice, RN          pantoprazole (Protonix) 40 mg in sodium chloride 0.9% PF 10 mL IV push       Date Action Dose Route User    3/31/2025 1526 Given 40 mg Intravenous Dianne Headley, RN       Vitals (last day)       Date/Time Temp Pulse Resp BP SpO2 Weight O2 Device O2 Flow Rate (L/min) [facility]    04/01/25 1300 -- 49 16 -- 93 % -- -- --     04/01/25 1200 98.3 °F (36.8 °C) 60 26 -- 94 % -- None (Room air) --     04/01/25 1100 --  50 14 -- 88 % -- -- -- KD    04/01/25 1000 -- 52 16 -- 92 % -- -- -- KD    04/01/25 0900 -- 54 14 -- 89 % -- -- -- KD    04/01/25 0800 98.4 °F (36.9 °C) 58 17 -- 93 % -- None (Room air) -- KD    04/01/25 0700 -- 59 19 -- 94 % -- -- -- LZ    04/01/25 0600 -- 58 14 -- 94 % -- -- -- LZ    04/01/25 0500 -- 56 18 -- 95 % -- -- -- LZ    04/01/25 0400 97.5 °F (36.4 °C) 57 12 -- 94 % -- -- -- LZ    04/01/25 0300 -- 56 15 -- 94 % -- -- -- LZ    04/01/25 0200 -- 55 13 -- 95 % -- -- -- LZ    04/01/25 0100 -- 55 12 -- 95 % -- -- -- LZ    04/01/25 0000 98 °F (36.7 °C) 56 17 -- 94 % -- None (Room air) -- LZ    03/31/25 2300 -- 56 10 -- 93 % -- -- -- LZ    03/31/25 2200 -- 57 16 -- 91 % -- -- -- LZ    03/31/25 2100 -- 57 18 -- 93 % -- -- -- LZ    03/31/25 2000 -- 58 17 -- 92 % -- -- -- LZ    03/31/25 1930 97.7 °F (36.5 °C) 58 19 -- 93 % -- None (Room air) -- LZ    03/31/25 1900 -- 58 19 -- 92 % -- -- -- LC    03/31/25 1800 -- 59 19 -- 94 % -- -- -- LC    03/31/25 1700 -- 59 14 -- 94 % -- -- -- LC    03/31/25 1600 98 °F (36.7 °C) 60 16 -- 92 % -- None (Room air) -- LC    03/31/25 1500 -- 59 17 -- 92 % -- -- -- LC    03/31/25 1400 -- 59 9 -- 93 % -- -- -- LC    03/31/25 1300 -- 60 13 -- 92 % -- -- -- LC    03/31/25 1200 97.4 °F (36.3 °C) 61 19 -- 94 % -- None (Room air) -- LC    03/31/25 1100 -- 61 17 -- 95 % -- -- -- LC    03/31/25 1000 -- 61 19 -- 95 % -- -- -- LC    03/31/25 0900 -- 62 19 -- 97 % -- -- -- LC    03/31/25 0814 -- -- -- -- 95 % -- None (Room air) -- AF    03/31/25 0800 97.1 °F (36.2 °C) 61 19 -- -- -- None (Room air) -- LC    03/31/25 0800 -- -- -- -- 97 % -- -- -- AF    03/31/25 0700 -- 60 15 -- 94 % -- -- -- TT    03/31/25 0600 -- 60 17 -- 97 % -- -- -- TT    03/31/25 0500 -- 60 11 -- 96 % -- -- -- TT    03/31/25 0400 97.8 °F (36.6 °C) 60 12 124/51 96 % 227 lb 11.8 oz (103.3 kg) CPAP -- TT    03/31/25 0300 -- 60 19 -- 97 % -- -- -- TT    03/31/25 0200 -- 61 13 -- 96 % -- -- -- TT    03/31/25 0100 -- 61 12 -- 95 %  -- -- -- TT    03/31/25 0000 98.3 °F (36.8 °C) 61 15 122/49 94 % -- CPAP -- TT

## 2025-04-01 NOTE — CM/SW NOTE
04/01/25 1100   CM/SW Referral Data   Referral Source    Reason for Referral Discharge planning   Informant Patient   Patient Info   Patient's Home Environment Condo/Apt no elevator   Number of Levels in Home 1   Patient lives with Alone   Discharge Needs   Anticipated D/C needs To be determined     Pt is a  59 year old female with a PMH of Type A aortic dissection s/p repair on 11/21/24 who presents with back pain/chest pain that seems to radiate through her chest wall. Plan for OR on 4/2.    Met with pt to introduce self and role. Pt stating she lives home alone and is iADLs and driving. Pt uses a cane as needed. No other dme. Pt can enter apartment by side door and avoid any steps. Pt lives on one level. Discussed available family or friends available to assist after discharge. Pt stating she has good friends that will help. Pt stating that after surgery in November, she was able to manage at home alone.     PT/OT to eval post surgery.     Ramírez Padgett, BEBETO RN, CM  [phone]

## 2025-04-02 ENCOUNTER — APPOINTMENT (OUTPATIENT)
Dept: GENERAL RADIOLOGY | Facility: HOSPITAL | Age: 60
End: 2025-04-02
Attending: INTERNAL MEDICINE
Payer: COMMERCIAL

## 2025-04-02 ENCOUNTER — ANESTHESIA (OUTPATIENT)
Dept: CARDIAC SURGERY | Facility: HOSPITAL | Age: 60
End: 2025-04-02
Payer: COMMERCIAL

## 2025-04-02 PROBLEM — I71.22 ANEURYSM OF AORTIC ARCH: Status: ACTIVE | Noted: 2025-04-02

## 2025-04-02 LAB
ALBUMIN SERPL-MCNC: 3.5 G/DL (ref 3.2–4.8)
ALBUMIN/GLOB SERPL: 1.3 {RATIO} (ref 1–2)
ALP LIVER SERPL-CCNC: 56 U/L
ALT SERPL-CCNC: 9 U/L
ANION GAP SERPL CALC-SCNC: 11 MMOL/L (ref 0–18)
ANION GAP SERPL CALC-SCNC: 7 MMOL/L (ref 0–18)
AST SERPL-CCNC: 9 U/L (ref ?–34)
BASE EXCESS BLD CALC-SCNC: -11 MMOL/L
BASE EXCESS BLD CALC-SCNC: -12 MMOL/L
BASE EXCESS BLD CALC-SCNC: -12 MMOL/L
BASE EXCESS BLDA CALC-SCNC: -8.5 MMOL/L (ref ?–2)
BASOPHILS # BLD AUTO: 0.01 X10(3) UL (ref 0–0.2)
BASOPHILS # BLD AUTO: 0.03 X10(3) UL (ref 0–0.2)
BASOPHILS NFR BLD AUTO: 0.2 %
BASOPHILS NFR BLD AUTO: 0.2 %
BILIRUB SERPL-MCNC: 0.2 MG/DL (ref 0.2–1.1)
BLOOD TYPE BARCODE: 7300
BODY TEMPERATURE: 98.6 F
BUN BLD-MCNC: 14 MG/DL (ref 9–23)
BUN BLD-MCNC: 16 MG/DL (ref 9–23)
CA-I BLD-SCNC: 1.22 MMOL/L (ref 1.12–1.32)
CA-I BLD-SCNC: 1.28 MMOL/L (ref 1.12–1.32)
CA-I BLD-SCNC: 1.31 MMOL/L (ref 0.95–1.32)
CA-I BLD-SCNC: 1.31 MMOL/L (ref 1.12–1.32)
CALCIUM BLD-MCNC: 8.8 MG/DL (ref 8.7–10.6)
CALCIUM BLD-MCNC: 9.4 MG/DL (ref 8.7–10.6)
CHLORIDE SERPL-SCNC: 108 MMOL/L (ref 98–112)
CHLORIDE SERPL-SCNC: 113 MMOL/L (ref 98–112)
CO2 BLD-SCNC: 16 MMOL/L (ref 22–32)
CO2 BLD-SCNC: 16 MMOL/L (ref 22–32)
CO2 BLD-SCNC: 17 MMOL/L (ref 22–32)
CO2 SERPL-SCNC: 15 MMOL/L (ref 21–32)
CO2 SERPL-SCNC: 18 MMOL/L (ref 21–32)
COHGB MFR BLD: 2 % SAT (ref 0–3)
CREAT BLD-MCNC: 1.67 MG/DL
CREAT BLD-MCNC: 1.88 MG/DL
EGFRCR SERPLBLD CKD-EPI 2021: 30 ML/MIN/1.73M2 (ref 60–?)
EGFRCR SERPLBLD CKD-EPI 2021: 35 ML/MIN/1.73M2 (ref 60–?)
EOSINOPHIL # BLD AUTO: 0.01 X10(3) UL (ref 0–0.7)
EOSINOPHIL # BLD AUTO: 0.14 X10(3) UL (ref 0–0.7)
EOSINOPHIL NFR BLD AUTO: 0.1 %
EOSINOPHIL NFR BLD AUTO: 2.5 %
ERYTHROCYTE [DISTWIDTH] IN BLOOD BY AUTOMATED COUNT: 15.9 %
ERYTHROCYTE [DISTWIDTH] IN BLOOD BY AUTOMATED COUNT: 15.9 %
GLOBULIN PLAS-MCNC: 2.8 G/DL (ref 2–3.5)
GLUCOSE BLD-MCNC: 113 MG/DL (ref 70–99)
GLUCOSE BLD-MCNC: 125 MG/DL (ref 70–99)
GLUCOSE BLD-MCNC: 126 MG/DL (ref 70–99)
GLUCOSE BLD-MCNC: 149 MG/DL (ref 70–99)
GLUCOSE BLD-MCNC: 175 MG/DL (ref 70–99)
HCO3 BLD-SCNC: 15.2 MEQ/L
HCO3 BLD-SCNC: 15.3 MEQ/L
HCO3 BLD-SCNC: 16.1 MEQ/L
HCO3 BLDA-SCNC: 18.3 MEQ/L (ref 21–27)
HCT VFR BLD AUTO: 29.7 %
HCT VFR BLD AUTO: 39.1 %
HCT VFR BLD CALC: 28 %
HCT VFR BLD CALC: 29 %
HCT VFR BLD CALC: 31 %
HGB BLD-MCNC: 12.6 G/DL
HGB BLD-MCNC: 9.1 G/DL
HGB BLD-MCNC: 9.7 G/DL
IMM GRANULOCYTES # BLD AUTO: 0.03 X10(3) UL (ref 0–1)
IMM GRANULOCYTES # BLD AUTO: 0.09 X10(3) UL (ref 0–1)
IMM GRANULOCYTES NFR BLD: 0.5 %
IMM GRANULOCYTES NFR BLD: 0.5 %
ISTAT ACTIVATED CLOTTING TIME: 135 SECONDS (ref 74–137)
ISTAT ACTIVATED CLOTTING TIME: 250 SECONDS (ref 74–137)
ISTAT ACTIVATED CLOTTING TIME: 262 SECONDS (ref 74–137)
ISTAT ACTIVATED CLOTTING TIME: 268 SECONDS (ref 74–137)
LACTATE BLD-SCNC: 0.8 MMOL/L (ref 0.5–2)
LYMPHOCYTES # BLD AUTO: 1.26 X10(3) UL (ref 1–4)
LYMPHOCYTES # BLD AUTO: 1.63 X10(3) UL (ref 1–4)
LYMPHOCYTES NFR BLD AUTO: 29.1 %
LYMPHOCYTES NFR BLD AUTO: 7.1 %
MAGNESIUM SERPL-MCNC: 2.2 MG/DL (ref 1.6–2.6)
MCH RBC QN AUTO: 24.4 PG (ref 26–34)
MCH RBC QN AUTO: 25.6 PG (ref 26–34)
MCHC RBC AUTO-ENTMCNC: 30.6 G/DL (ref 31–37)
MCHC RBC AUTO-ENTMCNC: 32.2 G/DL (ref 31–37)
MCV RBC AUTO: 79.3 FL
MCV RBC AUTO: 79.6 FL
METHGB MFR BLD: 0 % SAT (ref 0.4–1.5)
MONOCYTES # BLD AUTO: 0.61 X10(3) UL (ref 0.1–1)
MONOCYTES # BLD AUTO: 0.63 X10(3) UL (ref 0.1–1)
MONOCYTES NFR BLD AUTO: 10.9 %
MONOCYTES NFR BLD AUTO: 3.5 %
NEUTROPHILS # BLD AUTO: 15.84 X10 (3) UL (ref 1.5–7.7)
NEUTROPHILS # BLD AUTO: 15.84 X10(3) UL (ref 1.5–7.7)
NEUTROPHILS # BLD AUTO: 3.18 X10 (3) UL (ref 1.5–7.7)
NEUTROPHILS # BLD AUTO: 3.18 X10(3) UL (ref 1.5–7.7)
NEUTROPHILS NFR BLD AUTO: 56.8 %
NEUTROPHILS NFR BLD AUTO: 88.6 %
OSMOLALITY SERPL CALC.SUM OF ELEC: 284 MOSM/KG (ref 275–295)
OSMOLALITY SERPL CALC.SUM OF ELEC: 289 MOSM/KG (ref 275–295)
OXYHGB MFR BLDA: 96.1 % (ref 92–100)
PCO2 BLD: 33.1 MMHG
PCO2 BLD: 33.6 MMHG
PCO2 BLD: 35.5 MMHG
PCO2 BLDA: 29 MM HG (ref 35–45)
PH BLD: 7.27 [PH]
PH BLDA: 7.35 [PH] (ref 7.35–7.45)
PHOSPHATE SERPL-MCNC: 4.2 MG/DL (ref 2.4–5.1)
PLATELET # BLD AUTO: 227 10(3)UL (ref 150–450)
PLATELET # BLD AUTO: 236 10(3)UL (ref 150–450)
PO2 BLD: 118 MMHG
PO2 BLD: 212 MMHG
PO2 BLD: 92 MMHG
PO2 BLDA: 72 MM HG (ref 80–100)
POTASSIUM BLD-SCNC: 3.8 MMOL/L (ref 3.6–5.1)
POTASSIUM SERPL-SCNC: 4.2 MMOL/L (ref 3.5–5.1)
POTASSIUM SERPL-SCNC: 4.2 MMOL/L (ref 3.5–5.1)
PROT SERPL-MCNC: 6.3 G/DL (ref 5.7–8.2)
RBC # BLD AUTO: 3.73 X10(6)UL
RBC # BLD AUTO: 4.93 X10(6)UL
SAO2 % BLD: 100 %
SAO2 % BLD: 96 %
SAO2 % BLD: 98 %
SODIUM BLD-SCNC: 130 MMOL/L (ref 135–145)
SODIUM BLD-SCNC: 136 MMOL/L (ref 136–145)
SODIUM BLD-SCNC: 137 MMOL/L (ref 136–145)
SODIUM BLD-SCNC: 139 MMOL/L (ref 136–145)
SODIUM SERPL-SCNC: 135 MMOL/L (ref 136–145)
SODIUM SERPL-SCNC: 137 MMOL/L (ref 136–145)
UNIT VOLUME: 199 ML
WBC # BLD AUTO: 17.9 X10(3) UL (ref 4–11)
WBC # BLD AUTO: 5.6 X10(3) UL (ref 4–11)

## 2025-04-02 PROCEDURE — B31PYZZ FLUOROSCOPY OF THORACO-ABDOMINAL AORTA USING OTHER CONTRAST: ICD-10-PCS | Performed by: SURGERY

## 2025-04-02 PROCEDURE — 04VD3DZ RESTRICTION OF LEFT COMMON ILIAC ARTERY WITH INTRALUMINAL DEVICE, PERCUTANEOUS APPROACH: ICD-10-PCS | Performed by: SURGERY

## 2025-04-02 PROCEDURE — 30233N1 TRANSFUSION OF NONAUTOLOGOUS RED BLOOD CELLS INTO PERIPHERAL VEIN, PERCUTANEOUS APPROACH: ICD-10-PCS | Performed by: SURGERY

## 2025-04-02 PROCEDURE — 71045 X-RAY EXAM CHEST 1 VIEW: CPT | Performed by: INTERNAL MEDICINE

## 2025-04-02 PROCEDURE — 4A11X4G MONITORING OF PERIPHERAL NERVOUS ELECTRICAL ACTIVITY, INTRAOPERATIVE, EXTERNAL APPROACH: ICD-10-PCS | Performed by: SURGERY

## 2025-04-02 PROCEDURE — B31KYZZ FLUOROSCOPY OF BILATERAL UPPER EXTREMITY ARTERIES USING OTHER CONTRAST: ICD-10-PCS | Performed by: SURGERY

## 2025-04-02 PROCEDURE — B340ZZ3 ULTRASONOGRAPHY OF THORACIC AORTA, INTRAVASCULAR: ICD-10-PCS | Performed by: SURGERY

## 2025-04-02 PROCEDURE — B440ZZ3 ULTRASONOGRAPHY OF ABDOMINAL AORTA, INTRAVASCULAR: ICD-10-PCS | Performed by: SURGERY

## 2025-04-02 PROCEDURE — 047A3DZ DILATION OF LEFT RENAL ARTERY WITH INTRALUMINAL DEVICE, PERCUTANEOUS APPROACH: ICD-10-PCS | Performed by: SURGERY

## 2025-04-02 PROCEDURE — 02VW3DZ RESTRICTION OF THORACIC AORTA, DESCENDING WITH INTRALUMINAL DEVICE, PERCUTANEOUS APPROACH: ICD-10-PCS | Performed by: SURGERY

## 2025-04-02 PROCEDURE — 04VC3DZ RESTRICTION OF RIGHT COMMON ILIAC ARTERY WITH INTRALUMINAL DEVICE, PERCUTANEOUS APPROACH: ICD-10-PCS | Performed by: SURGERY

## 2025-04-02 PROCEDURE — 02VX3EZ RESTRICTION OF THORACIC AORTA, ASCENDING/ARCH WITH BRANCHED OR FENESTRATED INTRALUMINAL DEVICE, ONE OR TWO ARTERIES, PERCUTANEOUS APPROACH: ICD-10-PCS | Performed by: SURGERY

## 2025-04-02 PROCEDURE — 04V03DZ RESTRICTION OF ABDOMINAL AORTA WITH INTRALUMINAL DEVICE, PERCUTANEOUS APPROACH: ICD-10-PCS | Performed by: SURGERY

## 2025-04-02 PROCEDURE — B41CYZZ FLUOROSCOPY OF PELVIC ARTERIES USING OTHER CONTRAST: ICD-10-PCS | Performed by: SURGERY

## 2025-04-02 PROCEDURE — 031J0AY BYPASS LEFT COMMON CAROTID ARTERY TO UPPER ARTERY WITH AUTOLOGOUS ARTERIAL TISSUE, OPEN APPROACH: ICD-10-PCS | Performed by: SURGERY

## 2025-04-02 PROCEDURE — 99233 SBSQ HOSP IP/OBS HIGH 50: CPT | Performed by: INTERNAL MEDICINE

## 2025-04-02 PROCEDURE — B44HZZ3 ULTRASONOGRAPHY OF BILATERAL LOWER EXTREMITY ARTERIES, INTRAVASCULAR: ICD-10-PCS | Performed by: SURGERY

## 2025-04-02 PROCEDURE — 36430 TRANSFUSION BLD/BLD COMPNT: CPT | Performed by: INTERNAL MEDICINE

## 2025-04-02 PROCEDURE — 30233K1 TRANSFUSION OF NONAUTOLOGOUS FROZEN PLASMA INTO PERIPHERAL VEIN, PERCUTANEOUS APPROACH: ICD-10-PCS | Performed by: SURGERY

## 2025-04-02 DEVICE — IMPLANTABLE DEVICE: Type: IMPLANTABLE DEVICE | Site: ABDOMEN | Status: FUNCTIONAL

## 2025-04-02 DEVICE — IMPLANTABLE DEVICE: Type: IMPLANTABLE DEVICE | Site: GROIN | Status: FUNCTIONAL

## 2025-04-02 DEVICE — IMPLANTABLE DEVICE: Type: IMPLANTABLE DEVICE | Site: NECK | Status: FUNCTIONAL

## 2025-04-02 DEVICE — BARD® PTFE FELT PLEDGETS, (OVAL), 4.8 MM X 6 MM
Type: IMPLANTABLE DEVICE | Site: NECK | Status: FUNCTIONAL
Brand: BARD® PTFE FELT PLEDGETS

## 2025-04-02 DEVICE — TAG TBE THORACIC SIDE BRANCH 20MMX6CM 12MM HEPARIN
Type: IMPLANTABLE DEVICE | Status: FUNCTIONAL
Brand: GORE TAG THORACIC BRANCH ENDOPROSTHESIS

## 2025-04-02 DEVICE — TAG TBE AORTIC COMPONENT TAG TBE AC 12MMX40MMX15CM
Type: IMPLANTABLE DEVICE | Site: AORTA | Status: FUNCTIONAL
Brand: GORE TAG THORACIC BRANCH ENDOPROSTHESIS

## 2025-04-02 DEVICE — IMPLANTABLE DEVICE: Type: IMPLANTABLE DEVICE | Site: AORTA | Status: FUNCTIONAL

## 2025-04-02 RX ORDER — MIDAZOLAM HYDROCHLORIDE 1 MG/ML
INJECTION INTRAMUSCULAR; INTRAVENOUS AS NEEDED
Status: DISCONTINUED | OUTPATIENT
Start: 2025-04-02 | End: 2025-04-02 | Stop reason: SURG

## 2025-04-02 RX ORDER — FUROSEMIDE 10 MG/ML
INJECTION INTRAMUSCULAR; INTRAVENOUS
Status: COMPLETED
Start: 2025-04-02 | End: 2025-04-02

## 2025-04-02 RX ORDER — NICOTINE POLACRILEX 4 MG
15 LOZENGE BUCCAL
Status: DISCONTINUED | OUTPATIENT
Start: 2025-04-02 | End: 2025-04-04

## 2025-04-02 RX ORDER — HYDROMORPHONE HYDROCHLORIDE 1 MG/ML
0.4 INJECTION, SOLUTION INTRAMUSCULAR; INTRAVENOUS; SUBCUTANEOUS EVERY 2 HOUR PRN
Status: DISCONTINUED | OUTPATIENT
Start: 2025-04-02 | End: 2025-04-07

## 2025-04-02 RX ORDER — HYDROMORPHONE HYDROCHLORIDE 1 MG/ML
0.6 INJECTION, SOLUTION INTRAMUSCULAR; INTRAVENOUS; SUBCUTANEOUS EVERY 5 MIN PRN
Status: ACTIVE | OUTPATIENT
Start: 2025-04-02 | End: 2025-04-03

## 2025-04-02 RX ORDER — SODIUM CHLORIDE, SODIUM LACTATE, POTASSIUM CHLORIDE, CALCIUM CHLORIDE 600; 310; 30; 20 MG/100ML; MG/100ML; MG/100ML; MG/100ML
INJECTION, SOLUTION INTRAVENOUS CONTINUOUS
Status: DISCONTINUED | OUTPATIENT
Start: 2025-04-02 | End: 2025-04-04

## 2025-04-02 RX ORDER — HYDROCODONE BITARTRATE AND ACETAMINOPHEN 5; 325 MG/1; MG/1
1 TABLET ORAL EVERY 4 HOURS PRN
Status: DISCONTINUED | OUTPATIENT
Start: 2025-04-02 | End: 2025-04-07

## 2025-04-02 RX ORDER — DEXAMETHASONE SODIUM PHOSPHATE 4 MG/ML
VIAL (ML) INJECTION AS NEEDED
Status: DISCONTINUED | OUTPATIENT
Start: 2025-04-02 | End: 2025-04-02 | Stop reason: SURG

## 2025-04-02 RX ORDER — ACETAMINOPHEN 325 MG/1
650 TABLET ORAL EVERY 4 HOURS PRN
Status: DISCONTINUED | OUTPATIENT
Start: 2025-04-02 | End: 2025-04-07

## 2025-04-02 RX ORDER — NICOTINE POLACRILEX 4 MG
30 LOZENGE BUCCAL
Status: DISCONTINUED | OUTPATIENT
Start: 2025-04-02 | End: 2025-04-04

## 2025-04-02 RX ORDER — LIDOCAINE HYDROCHLORIDE 10 MG/ML
INJECTION, SOLUTION EPIDURAL; INFILTRATION; INTRACAUDAL; PERINEURAL AS NEEDED
Status: DISCONTINUED | OUTPATIENT
Start: 2025-04-02 | End: 2025-04-02 | Stop reason: SURG

## 2025-04-02 RX ORDER — HYDROMORPHONE HYDROCHLORIDE 1 MG/ML
0.8 INJECTION, SOLUTION INTRAMUSCULAR; INTRAVENOUS; SUBCUTANEOUS EVERY 2 HOUR PRN
Status: DISCONTINUED | OUTPATIENT
Start: 2025-04-02 | End: 2025-04-07

## 2025-04-02 RX ORDER — HYDROMORPHONE HYDROCHLORIDE 1 MG/ML
0.2 INJECTION, SOLUTION INTRAMUSCULAR; INTRAVENOUS; SUBCUTANEOUS EVERY 2 HOUR PRN
Status: DISCONTINUED | OUTPATIENT
Start: 2025-04-02 | End: 2025-04-07

## 2025-04-02 RX ORDER — CALCIUM CHLORIDE 100 MG/ML
INJECTION INTRAVENOUS; INTRAVENTRICULAR AS NEEDED
Status: DISCONTINUED | OUTPATIENT
Start: 2025-04-02 | End: 2025-04-02 | Stop reason: SURG

## 2025-04-02 RX ORDER — HYDROCODONE BITARTRATE AND ACETAMINOPHEN 5; 325 MG/1; MG/1
2 TABLET ORAL EVERY 4 HOURS PRN
Status: DISCONTINUED | OUTPATIENT
Start: 2025-04-02 | End: 2025-04-07

## 2025-04-02 RX ORDER — HEPARIN SODIUM 5000 [USP'U]/ML
5000 INJECTION, SOLUTION INTRAVENOUS; SUBCUTANEOUS EVERY 12 HOURS SCHEDULED
Status: DISCONTINUED | OUTPATIENT
Start: 2025-04-03 | End: 2025-04-05 | Stop reason: ALTCHOICE

## 2025-04-02 RX ORDER — ONDANSETRON 2 MG/ML
4 INJECTION INTRAMUSCULAR; INTRAVENOUS EVERY 6 HOURS PRN
Status: DISCONTINUED | OUTPATIENT
Start: 2025-04-02 | End: 2025-04-02 | Stop reason: ALTCHOICE

## 2025-04-02 RX ORDER — ONDANSETRON 2 MG/ML
INJECTION INTRAMUSCULAR; INTRAVENOUS AS NEEDED
Status: DISCONTINUED | OUTPATIENT
Start: 2025-04-02 | End: 2025-04-02 | Stop reason: SURG

## 2025-04-02 RX ORDER — HYDROMORPHONE HYDROCHLORIDE 1 MG/ML
0.4 INJECTION, SOLUTION INTRAMUSCULAR; INTRAVENOUS; SUBCUTANEOUS EVERY 5 MIN PRN
Status: DISPENSED | OUTPATIENT
Start: 2025-04-02 | End: 2025-04-03

## 2025-04-02 RX ORDER — HEPARIN SODIUM 1000 [USP'U]/ML
INJECTION, SOLUTION INTRAVENOUS; SUBCUTANEOUS AS NEEDED
Status: DISCONTINUED | OUTPATIENT
Start: 2025-04-02 | End: 2025-04-02 | Stop reason: SURG

## 2025-04-02 RX ORDER — FUROSEMIDE 10 MG/ML
40 INJECTION INTRAMUSCULAR; INTRAVENOUS ONCE
Status: COMPLETED | OUTPATIENT
Start: 2025-04-02 | End: 2025-04-02

## 2025-04-02 RX ORDER — VERAPAMIL HYDROCHLORIDE 2.5 MG/ML
INJECTION INTRAVENOUS
Status: COMPLETED
Start: 2025-04-02 | End: 2025-04-02

## 2025-04-02 RX ORDER — ROCURONIUM BROMIDE 10 MG/ML
INJECTION, SOLUTION INTRAVENOUS AS NEEDED
Status: DISCONTINUED | OUTPATIENT
Start: 2025-04-02 | End: 2025-04-02 | Stop reason: SURG

## 2025-04-02 RX ORDER — PHENYLEPHRINE HCL 10 MG/ML
VIAL (ML) INJECTION AS NEEDED
Status: DISCONTINUED | OUTPATIENT
Start: 2025-04-02 | End: 2025-04-02 | Stop reason: SURG

## 2025-04-02 RX ORDER — NALOXONE HYDROCHLORIDE 0.4 MG/ML
0.08 INJECTION, SOLUTION INTRAMUSCULAR; INTRAVENOUS; SUBCUTANEOUS AS NEEDED
Status: ACTIVE | OUTPATIENT
Start: 2025-04-02 | End: 2025-04-03

## 2025-04-02 RX ORDER — ONDANSETRON 2 MG/ML
4 INJECTION INTRAMUSCULAR; INTRAVENOUS EVERY 6 HOURS PRN
Status: DISCONTINUED | OUTPATIENT
Start: 2025-04-02 | End: 2025-04-07

## 2025-04-02 RX ORDER — SODIUM CHLORIDE 9 MG/ML
INJECTION, SOLUTION INTRAVENOUS CONTINUOUS PRN
Status: DISCONTINUED | OUTPATIENT
Start: 2025-04-02 | End: 2025-04-02 | Stop reason: SURG

## 2025-04-02 RX ORDER — PROCHLORPERAZINE EDISYLATE 5 MG/ML
5 INJECTION INTRAMUSCULAR; INTRAVENOUS EVERY 8 HOURS PRN
Status: DISCONTINUED | OUTPATIENT
Start: 2025-04-02 | End: 2025-04-07

## 2025-04-02 RX ORDER — HYDROMORPHONE HYDROCHLORIDE 1 MG/ML
0.2 INJECTION, SOLUTION INTRAMUSCULAR; INTRAVENOUS; SUBCUTANEOUS EVERY 5 MIN PRN
Status: ACTIVE | OUTPATIENT
Start: 2025-04-02 | End: 2025-04-03

## 2025-04-02 RX ORDER — MIRTAZAPINE 7.5 MG/1
TABLET, FILM COATED ORAL
Qty: 30 TABLET | Refills: 0 | OUTPATIENT
Start: 2025-04-02

## 2025-04-02 RX ORDER — ASPIRIN 81 MG/1
81 TABLET, COATED ORAL DAILY
Qty: 30 TABLET | Refills: 0 | OUTPATIENT
Start: 2025-04-02

## 2025-04-02 RX ORDER — IODIXANOL 320 MG/ML
150 INJECTION, SOLUTION INTRAVASCULAR
Status: COMPLETED | OUTPATIENT
Start: 2025-04-02 | End: 2025-04-02

## 2025-04-02 RX ORDER — PROTAMINE SULFATE 10 MG/ML
INJECTION, SOLUTION INTRAVENOUS AS NEEDED
Status: DISCONTINUED | OUTPATIENT
Start: 2025-04-02 | End: 2025-04-02 | Stop reason: SURG

## 2025-04-02 RX ORDER — INDOMETHACIN 25 MG/1
CAPSULE ORAL AS NEEDED
Status: DISCONTINUED | OUTPATIENT
Start: 2025-04-02 | End: 2025-04-02 | Stop reason: SURG

## 2025-04-02 RX ORDER — MELATONIN
Qty: 30 TABLET | Refills: 0 | OUTPATIENT
Start: 2025-04-02

## 2025-04-02 RX ORDER — DEXTROSE MONOHYDRATE 25 G/50ML
50 INJECTION, SOLUTION INTRAVENOUS
Status: DISCONTINUED | OUTPATIENT
Start: 2025-04-02 | End: 2025-04-04

## 2025-04-02 RX ORDER — NITROGLYCERIN 20 MG/100ML
INJECTION INTRAVENOUS
Status: COMPLETED
Start: 2025-04-02 | End: 2025-04-02

## 2025-04-02 RX ADMIN — LIDOCAINE HYDROCHLORIDE 50 MG: 10 INJECTION, SOLUTION EPIDURAL; INFILTRATION; INTRACAUDAL; PERINEURAL at 14:39:00

## 2025-04-02 RX ADMIN — MIDAZOLAM HYDROCHLORIDE 2 MG: 1 INJECTION INTRAMUSCULAR; INTRAVENOUS at 14:57:00

## 2025-04-02 RX ADMIN — ROCURONIUM BROMIDE 20 MG: 10 INJECTION, SOLUTION INTRAVENOUS at 16:06:00

## 2025-04-02 RX ADMIN — INDOMETHACIN 50 ML: 25 CAPSULE ORAL at 20:10:00

## 2025-04-02 RX ADMIN — PHENYLEPHRINE HCL 100 MCG: 10 MG/ML VIAL (ML) INJECTION at 16:36:00

## 2025-04-02 RX ADMIN — SODIUM CHLORIDE: 9 INJECTION, SOLUTION INTRAVENOUS at 14:34:00

## 2025-04-02 RX ADMIN — ROCURONIUM BROMIDE 50 MG: 10 INJECTION, SOLUTION INTRAVENOUS at 15:37:00

## 2025-04-02 RX ADMIN — ONDANSETRON 4 MG: 2 INJECTION INTRAMUSCULAR; INTRAVENOUS at 20:15:00

## 2025-04-02 RX ADMIN — CALCIUM CHLORIDE 0.5 G: 100 INJECTION INTRAVENOUS; INTRAVENTRICULAR at 18:27:00

## 2025-04-02 RX ADMIN — PROTAMINE SULFATE 50 MG: 10 INJECTION, SOLUTION INTRAVENOUS at 19:57:00

## 2025-04-02 RX ADMIN — INDOMETHACIN 25 ML: 25 CAPSULE ORAL at 17:28:00

## 2025-04-02 RX ADMIN — HEPARIN SODIUM 12000 UNITS: 1000 INJECTION, SOLUTION INTRAVENOUS; SUBCUTANEOUS at 16:29:00

## 2025-04-02 RX ADMIN — DEXAMETHASONE SODIUM PHOSPHATE 4 MG: 4 MG/ML VIAL (ML) INJECTION at 15:19:00

## 2025-04-02 RX ADMIN — ROCURONIUM BROMIDE 20 MG: 10 INJECTION, SOLUTION INTRAVENOUS at 16:46:00

## 2025-04-02 RX ADMIN — ROCURONIUM BROMIDE 10 MG: 10 INJECTION, SOLUTION INTRAVENOUS at 18:24:00

## 2025-04-02 RX ADMIN — ROCURONIUM BROMIDE 50 MG: 10 INJECTION, SOLUTION INTRAVENOUS at 14:44:00

## 2025-04-02 RX ADMIN — CALCIUM CHLORIDE 0.5 G: 100 INJECTION INTRAVENOUS; INTRAVENTRICULAR at 18:22:00

## 2025-04-02 NOTE — ANESTHESIA PROCEDURE NOTES
Airway  Date/Time: 4/2/2025 2:41 PM  Urgency: elective      General Information and Staff    Patient location during procedure: OR  Anesthesiologist: Tanner Coffey MD  Performed: anesthesiologist   Performed by: Tanner Coffey MD  Authorized by: Tanner Coffey MD      Indications and Patient Condition  Indications for airway management: anesthesia  Sedation level: deep  Preoxygenated: yes  Patient position: sniffing  Mask difficulty assessment: 1 - vent by mask    Final Airway Details  Final airway type: endotracheal airway      Successful airway: ETT  Cuffed: yes   Successful intubation technique: Video laryngoscopy  Endotracheal tube insertion site: oral  Blade: GlideScope  Blade size: #3  ETT size (mm): 7.0    Placement verified by: capnometry   Cuff volume (mL): 8  Measured from: lips  ETT to lips (cm): 22  Number of attempts at approach: 1    Additional Comments  atraumatic

## 2025-04-02 NOTE — PROGRESS NOTES
The patient was seen during the overnight shift p.m. labs were ordered.  It appears the patient has a significant hyperchloremic metabolic acidosis.  Lactate normal.  Patient still on nicardipine as well as esmolol.  When reviewing constituents he then [place] pain is not 0.9 normal saline and the patient has had a marked volume infusion over the last few days likely causing this iatrogenic issue.  I called the pharmacy the only thing they are able to do is changed to 0.9 normal saline to D5 with the nicardipine the esmolol cannot be changed.  Patient's operative time is not till 11 AM what I will probably do based on a.m. labs started bicarbonate drip for 6 to 12 hours.  This should not preclude the patient going to the operating room this is strictly a nongap acidosis based on fluid resuscitation.  Given her imbalance with volume and a preserved ejection fraction we will give 1 dose of IV Lasix now.  As mentioned we will likely start bicarbonate drip based on a.m. labs

## 2025-04-02 NOTE — PLAN OF CARE
Received patient following report. Alert and oriented. FC and PRECIADO. Baseline tingling to LLE. PRN xanax given for anxiety. 2L NC, CPAP at noc. SB. Esmolol and cardene gtts titrated per orders. Webb inserted per orders. NPO at MN. See flowsheets and MAR.

## 2025-04-02 NOTE — CM/SW NOTE
Met with pt and sister at bedside to discuss the likelihood of pt needed extra assistance once discharged to home. Sister stating she is not local but then proceeded to say, she lives 30mins from pt. Emphasized with pt that reaching out to family and friends for help would be prudent. Pt and sister did not engage in conversation at this time.   Will follow up after surgery to determine discharge needs.     BEBETO Mcdowell RN, CM  [phone]

## 2025-04-02 NOTE — PROGRESS NOTES
[facility]   part of [facility]     Hospitalist Progress Note     Alisha Wilde Patient Status:  Inpatient    10/25/1965 MRN [id number]   Location [facility] 4SW-A Attending Jade Cameron MD   Hosp Day # 3 PCP Bridger Avendano MD     Chief Complaint: back pain, chest pain     Subjective:     Patient went to the OR when I tried to evaluate patient.     Objective:      Vital signs:  Temp:  [97.9 °F (36.6 °C)-98.6 °F (37 °C)] 98.6 °F (37 °C)  Pulse:  [47-64] 63  Resp:  [12-22] 15  SpO2:  [90 %-97 %] 94 %  AO: (115-129)/(46-52) 129/52    Physical Exam:    Patient was not examined today     Diagnostic Data:    Labs:  Recent Labs   Lab 25  0757 25  1631 25  1632 25  0320 25  0235   WBC 7.9  --  6.4 5.1 5.0 5.1 5.6   HGB 9.8*  --  10.0* 9.4* 9.3* 9.8* 9.1*   MCV 79.5*  --  78.4* 79.1* 78.9* 79.4* 79.6*   .0  --  268.0 249.0 228.0 228.0 227.0   INR 1.04 1.14  --  1.15  --  1.14  --        Recent Labs   Lab 25  0235   * 102* 149*   BUN 13 14 16   CREATSERUM 1.54* 1.70* 1.88*   CA 9.0 8.8 8.8   ALB 3.8 3.9 3.5    135* 135*   K 4.3 4.7 4.2   * 114* 113*   CO2 18.0* 14.0* 15.0*   ALKPHO 55 58 56   AST 9 12 9   ALT <7* <7* 9*   BILT 0.2 0.2 0.2   TP 6.5 6.7 6.3       Estimated Glomerular Filtration Rate: 30 mL/min/1.73m2 (A) (result from lab).    Recent Labs   Lab 25  0757   TROPHS 8       Recent Labs   Lab 25  1631 25  0320 25  1739   PTP 14.7 14.8 14.8   INR 1.14 1.15 1.14                  Microbiology    No results found for this visit on 25.      Imaging: Reviewed in Epic.    Medications:    pantoprazole  40 mg Intravenous Q24H    ezetimibe  10 mg Oral Daily       Assessment & Plan:      #Type B aortic Dissection  #L renal artery dissection  Vascular surgery following  Monitor in ICU  BP control  Continue cardene and esmolol gtt  Vascular surgery  following  Pain control with PRN moprhine  Or today      #Prior hx of Type A aortic dissection  S/p repair 11/2024  Post op complications with rep failure and NAIMA needing trach and peg that are now removed     #Ess HTN  Resume meds  BP control    #Dyslipidemia  zetia     #CKD3     #TANYA     #Obesity, BMI 36    Jade Cameron MD    Supplementary Documentation:     Quality:  DVT Mechanical Prophylaxis:   SCDs, Early ambuation  DVT Pharmacologic Prophylaxis   Medication   None                Code Status: Full Code  Webb: Webb catheter in place  Webb Duration (in days): 2  Central line:    BRANDON:     Discharge is dependent on: course  At this point Ms. Wilde is expected to be discharge to: unclear    The 21st Century Cures Act makes medical notes like these available to patients in the interest of transparency. Please be advised this is a medical document. Medical documents are intended to carry relevant information, facts as evident, and the clinical opinion of the practitioner. The medical note is intended as peer to peer communication and may appear blunt or direct. It is written in medical language and may contain abbreviations or verbiage that are unfamiliar.

## 2025-04-02 NOTE — ANESTHESIA PROCEDURE NOTES
Central Line    Date/Time: 4/2/2025 2:45 PM    Performed by: Tanner Coffey MD  Authorized by: Tanner Coffey MD    General Information and Staff    Procedure Start:  4/2/2025 2:45 PM  Procedure End:  4/2/2025 2:50 PM  Anesthesiologist:  Tanner Coffey MD  Performed by:  Anesthesiologist  Patient Location:  OR  Indication: central venous access and CVP monitoring    Site Identification: real time ultrasound guided        Procedure Detail    Patient Position:  Trendelenburg  Laterality:  Right  Site:  Internal jugular  Prep:  Chloraprep  Catheter Size:  9 Fr  Catheter Type:  MAC introducer  Number of Lumens:  Double lumen  Procedure Detail: target vein identified, needle advanced into vein and blood aspirated and guidewire advanced into vein    Seldinger Technique?: Yes    Intravenous Verification: verified by ultrasound and venous blood return    Post Insertion: all ports aspirated, all ports flushed easily, guidewire was removed intact, line was sutured in place and dressing was applied      Assessment    Events: patient tolerated procedure well with no complications      PA Catheter Placement    PA Catheter Placed?: No      Additional Comments

## 2025-04-02 NOTE — PROGRESS NOTES
[facility]  Pulmonary/Critical Care Progress note    Alisha Wilde Patient Status:  Inpatient    10/25/1965 MRN [id number]   Location [facility] 4SW-A Attending Sumit Kapoor MD   Hosp Day # 3 PCP Bridger Avendano MD       History of Present Illness:  Breathing well this morning denies any chest pains or abdominal pains.    History:  Past Medical History:    Chronic kidney disease (CKD)    Esophageal reflux    High blood pressure    High cholesterol    HYPERTENSION    Hypertension    Unspecified essential hypertension     Past Surgical History:   Procedure Laterality Date    Anesth,open heart surgery  2024    Colonoscopy N/A 2018    Procedure: COLONOSCOPY;  Surgeon: Magdy Webber MD;  Location: [facility] ENDOSCOPY    Endometrial ablation      child passed away for 5 mins          1    Other surgical history  2011    cysto-Dr. Lacey -- pt denies     Family History   Problem Relation Age of Onset    Hypertension Mother     Heart Disorder Mother 70    Other (Other) Mother         kidney failure    Hypertension Father     Hypertension Maternal Grandfather     Cancer Neg     Diabetes Neg     Glaucoma Neg     Macular degeneration Neg       reports that she quit smoking about 26 years ago. Her smoking use included cigarettes. She started smoking about 39 years ago. She has a 13 pack-year smoking history. She has quit using smokeless tobacco. She reports that she does not currently use alcohol after a past usage of about 1.0 - 2.0 standard drink of alcohol per week. She reports that she does not use drugs.    Allergies:  Allergies[1]    Medications:    Current Facility-Administered Medications:     sodium bicarbonate 100 mEq in dextrose 5% 1,100 mL infusion, 100 mEq, Intravenous, Continuous    niCARdipine (carDENE) 100,000 mcg in dextrose 5% 1,000 mL infusion (non-standard concentration), , Intravenous, Continuous    morphINE PF 2 MG/ML injection 2 mg, 2 mg, Intravenous, Q1H  PRN    acetaminophen (Tylenol Extra Strength) tab 500 mg, 500 mg, Oral, Q4H PRN    melatonin tab 3 mg, 3 mg, Oral, Nightly PRN    polyethylene glycol (PEG 3350) (Miralax) 17 g oral packet 17 g, 17 g, Oral, Daily PRN    sennosides (Senokot) tab 17.2 mg, 17.2 mg, Oral, Nightly PRN    bisacodyl (Dulcolax) 10 MG rectal suppository 10 mg, 10 mg, Rectal, Daily PRN    fleet enema (Fleet) rectal enema 133 mL, 1 enema, Rectal, Once PRN    pantoprazole (Protonix) 40 mg in sodium chloride 0.9% PF 10 mL IV push, 40 mg, Intravenous, Q24H    ALPRAZolam (Xanax) tab 0.5 mg, 0.5 mg, Oral, TID PRN    ondansetron (Zofran) 4 MG/2ML injection 4 mg, 4 mg, Intravenous, Q6H PRN    prochlorperazine (Compazine) 10 MG/2ML injection 5 mg, 5 mg, Intravenous, Q8H PRN    ezetimibe (Zetia) tab 10 mg, 10 mg, Oral, Daily    esmolol (Brevibloc) 2000 mg/100mL infusion premix,  mcg/kg/min, Intravenous, Continuous    Intake/Output:    Intake/Output Summary (Last 24 hours) at 4/2/2025 0722  Last data filed at 4/2/2025 0600  Gross per 24 hour   Intake 7740.82 ml   Output 1300 ml   Net 6440.82 ml      Body mass index is 37.9 kg/m².    Review of Systems  Review of Systems:   A comprehensive 10 point review of systems was completed.  Pertinent positives and negatives noted in the the HPI.       Patient Vitals for the past 24 hrs:   Temp Temp src Pulse Resp SpO2   04/01/25 0700 -- -- 59 19 94 %   04/01/25 0600 -- -- 58 14 94 %   04/01/25 0500 -- -- 56 18 95 %   04/01/25 0400 97.5 °F (36.4 °C) Temporal 57 12 94 %   04/01/25 0300 -- -- 56 15 94 %   04/01/25 0200 -- -- 55 13 95 %   04/01/25 0100 -- -- 55 12 95 %   04/01/25 0000 98 °F (36.7 °C) Temporal 56 17 94 %   03/31/25 2300 -- -- 56 10 93 %   03/31/25 2200 -- -- 57 16 91 %   03/31/25 2100 -- -- 57 18 93 %   03/31/25 2000 -- -- 58 17 92 %   03/31/25 1930 97.7 °F (36.5 °C) Temporal 58 19 93 %   03/31/25 1900 -- -- 58 19 92 %   03/31/25 1800 -- -- 59 19 94 %   03/31/25 1700 -- -- 59 14 94 %   03/31/25  1600 98 °F (36.7 °C) Temporal 60 16 92 %   03/31/25 1500 -- -- 59 17 92 %   03/31/25 1400 -- -- 59 (!) 9 93 %   03/31/25 1300 -- -- 60 13 92 %   03/31/25 1200 97.4 °F (36.3 °C) Temporal 61 19 94 %   03/31/25 1100 -- -- 61 17 95 %   03/31/25 1000 -- -- 61 19 95 %   03/31/25 0900 -- -- 62 19 97 %   03/31/25 0814 -- -- -- -- 95 %     Vitals:    04/02/25 0400 04/02/25 0500 04/02/25 0600 04/02/25 0700   BP:       Pulse: 56 57 59 60   Resp: 12 13 14 13   Temp: 98.3 °F (36.8 °C)      TempSrc: Temporal      SpO2: 96% 96% 94% 93%   Weight:             Physical Exam  Constitutional:       General: She is not in acute distress.     Appearance: Normal appearance. She is obese. She is not ill-appearing or diaphoretic.   HENT:      Head: Normocephalic and atraumatic.      Nose: Nose normal. No congestion or rhinorrhea.      Mouth/Throat:      Mouth: Mucous membranes are moist.      Pharynx: Oropharynx is clear. No oropharyngeal exudate or posterior oropharyngeal erythema.      Comments: Mallampati class IV palate   Eyes:      Extraocular Movements: Extraocular movements intact.      Pupils: Pupils are equal, round, and reactive to light.   Cardiovascular:      Rate and Rhythm: Normal rate.      Pulses: Normal pulses.      Heart sounds: Normal heart sounds. No murmur heard.  Pulmonary:      Effort: Pulmonary effort is normal. No respiratory distress.      Breath sounds: Normal breath sounds. No wheezing or rhonchi.      Comments: Diminished breath sound bilaterally  Chest:      Chest wall: No tenderness.   Abdominal:      General: Abdomen is flat. Bowel sounds are normal.      Palpations: Abdomen is soft.   Musculoskeletal:         General: Normal range of motion.      Comments: SCD boots to lower extremities   Skin:     General: Skin is warm.   Neurological:      General: No focal deficit present.      Mental Status: She is alert and oriented to person, place, and time.   Psychiatric:         Mood and Affect: Mood normal.          Behavior: Behavior normal.         Thought Content: Thought content normal.         Judgment: Judgment normal.            Lab Data Review:  Recent Labs   Lab 04/01/25 0449 04/01/25 1739 04/02/25 0235   WBC 5.0 5.1 5.6   HGB 9.3* 9.8* 9.1*   HCT 30.0* 31.6* 29.7*   .0 228.0 227.0       Recent Labs   Lab 04/01/25 0449 04/01/25 1739 04/02/25 0235    135* 135*   K 4.3 4.7 4.2   * 114* 113*   CO2 18.0* 14.0* 15.0*   BUN 13 14 16   CREATSERUM 1.54* 1.70* 1.88*   CA 9.0 8.8 8.8   ALB 3.8 3.9 3.5   ALKPHO 55 58 56   ALT <7* <7* 9*   AST 9 12 9   * 102* 149*       Recent Labs   Lab 04/01/25 0449 04/02/25 0235   MG 2.3 2.2       Lab Results   Component Value Date    PHOS 4.2 04/02/2025        Recent Labs   Lab 03/30/25  0757 03/30/25  1631 03/31/25  0320 04/01/25 1739   INR 1.04 1.14 1.15 1.14   PTT 29.6  --   --  31.1       Recent Labs   Lab 04/01/25 2034   ABGPHT 7.31*   KPLTCZ5Q 27*   KDSUY7J 65*   ABGHCO3 16.0*   ABGBE -11.3*   TEMP 98.6   ANDIE Not Applicable   SITE Arterial Line   DEV Continuous Mask CPAP   THGB 9.8*       No results for input(s): \"TROP\", \"CKMB\" in the last 168 hours.    Cultures: No results found for this visit on 03/30/25.        Radiology personally reviewed:  XR CHEST AP PORTABLE  (CPT=71045)    Result Date: 4/2/2025  CONCLUSION:  Stable cardiomegaly with interstitial opacities likely representing edema.   LOCATION:  [facility]      Dictated by (CST): Armando Frias MD on 4/02/2025 at 6:40 AM     Finalized by (CST): Armando Frias MD on 4/02/2025 at 6:41 AM       CTA CHEST CTA ABDOMEN CTA PELVIS (CPT=71275/48606)    Result Date: 3/31/2025  CONCLUSION:  1. Type B aortic dissection extends from the aortic arch at the level of the right innominate artery through the abdominal aorta with extension into the proximal left renal artery with moderate narrowing of the true lumen of the left renal artery.  The inferior mesenteric artery arises from the opacified false  lumen.  The dissection also extends through the aortic bifurcation with termination in the proximal right common iliac artery and mid left external iliac artery.  There is marked narrowing of the  true lumen of the proximal left external iliac artery at distal level of the dissection with suspected thrombosis of the false lumen.  Early contrast opacification of the false lumen proximally and in the abdominal aorta indicates flow through the intimal flap at these levels.  There is stable moderate to marked narrowing of the true lumen of the thoracic and abdominal aorta.  There is stable dilatation of the aortic arch measuring up to 5 cm. 2. Stable postsurgical changes of repair of ascending aorta.  New line interval development of small bilateral pleural effusions and mild dependent atelectasis. 3. Uncomplicated diverticula of left colon.   LOCATION:  [facility]   Dictated by (CST): Osito Skinner MD on 3/31/2025 at 10:10 AM     Finalized by (CST): Osito Skinner MD on 3/31/2025 at 10:42 AM         Patient Active Problem List   Diagnosis    Hypercholesteremia    Benign essential HTN    Vitamin D deficiency    Adjustment disorder with mixed anxiety and depressed mood    Controlled type 2 diabetes mellitus without complication, without long-term current use of insulin (HCC)    Obesity (BMI 30-39.9)    Polyp of colon    Flat feet, bilateral    Myalgia due to statin    Dissection of ascending aorta (HCC)    Stage 3 chronic kidney disease (HCC)    Iron deficiency    Constipation    Leg swelling    Gastroesophageal reflux disease    Dissection of aorta, unspecified portion of aorta (HCC)     59-year-old female presented to the emergency room at [facility] on 3/30/2025 with history of type a aortic dissection s/p repair on 11/21/2024 with complicated course with respiratory failure and renal failure required prolonged intubation and then trach and PEG and subsequently did well in rehab and she was decannulated and also PEG  tube was removed  Patient also has history of TANYA, HTN, HL, CKD , obesity with a BMI of 35, GERD     Assessment/plan:    Pulmonary:  Obstructive sleep apnea syndrome  Severe obesity BMI 36    Plan:  Continue CPAP at bedtime and as needed    Cardiovascular:  Type B aortic dissection  Aneurysmal degeneration of zone 3  Hypertension  Sinus bradycardia cardene and esmolool: Clinically stable  History of type A aortic dissection status postrepair in November 2024 at [facility] complicated by respiratory failure requiring tracheostomy and PEG tube placement.    Plan:  Vascular surgery plans left carotid to subclavian transposition, fenestrated arch repair and thoracic stent for dissection along with left renal stent and bilateral iliac stent placement today    Hematologic/coagulation  Anemia    Plan:  Monitor hemoglobin    Renal/FEN  Left renal artery dissection  Chronic kidney disease stage III  NAGMA  Hypochloremic metabolic acidosis      Plan   left carotid to subclavian transposition with fenestrated arch repair, thoracic stent for dissection, left renal stent and bilateral iliac stent placements on 4/2   Change IV fluids on antibiotics to reduce volume and chloride overload    Endocrine/DEM  Dyslipidemia    Plan:  Continue Zetia 10 mg oral daily    Gastroenterology/hepatology   GERD    Plan:  N.p.o. for now  Protonix 40 mg IV twice daily    Infectious disease:  No acute disease    Plan:  Monitor    Neurologic/psychiatry  No acute disease    Plan:  Monitor    Musculoskeletal/rheumatology  No acute disease    Plan:  Monitor      Prophylaxis:    DVT prophylaxis: SCD boots   GI prophylaxis: Protonix 40 mg  twice daily    Lines:  PIV  Catheters:  Feeding:     Disposition:  ICU monitoring       CODE STATUS: Full code           Mj Guerra MD       Note to the patient: The 21st Century Cures Act makes medical notes like these available to patients in the interest of transparency. However, be advised that this is a  medical document. It is intended as peer to peer communication. It is written in medical language and may contain abbreviations or verbiage that are unfamiliar. It may appear blunt or direct. Medical documents are intended to carry relevant information, facts as evident, and clinical opinion of the practitioner.      Disclaimer: Components of this note were documented using voice recognition system and are subject to errors not corrected at proofreading. Contact the author of this note for any clarifications           [1]   Allergies  Allergen Reactions    Atorvastatin MYALGIA    Pravastatin MYALGIA    Rosuvastatin MYALGIA    Seasonal Runny nose

## 2025-04-02 NOTE — ANESTHESIA PROCEDURE NOTES
Arterial Line    Date/Time: 4/2/2025 3:35 PM    Performed by: Tanner Coffey MD  Authorized by: Tanner Coffey MD    General Information and Staff    Procedure Start:  4/2/2025 3:35 PM  Procedure End:  4/2/2025 3:35 PM  Anesthesiologist:  Tanner Coffey MD  Performed By:  Anesthesiologist  Patient Location:  OR  Indication: continuous blood pressure monitoring and blood sampling needed    Site Identification: real time ultrasound guided and image stored and retrievable    Preanesthetic Checklist: 2 patient identifiers, IV checked, risks and benefits discussed, monitors and equipment checked, pre-op evaluation, timeout performed, anesthesia consent and sterile technique used    Procedure Details    Catheter Size:  20 G  Catheter Length:  1 and 3/4 inch  Catheter Type:  Arrow  Seldinger Technique?: Yes    Laterality:  Left  Site:  Radial artery  Site Prep: chlorhexidine    Line Secured:  Tape and Tegaderm    Assessment    Events: patient tolerated procedure well with no complications      Medications  4/2/2025 3:35 PM      Additional Comments

## 2025-04-02 NOTE — PLAN OF CARE
Pt delivered to [facility]. Cardene gtt and esmolol gtt infusing- titrated for sbp 130 see eMAR. Extra bags of medication given to anesthesia. Pt A&Ox4. On RA. No distress. Art line intact. Webb in place. See flowsheets for further information.

## 2025-04-03 LAB
ANION GAP SERPL CALC-SCNC: 10 MMOL/L (ref 0–18)
APTT PPP: 27.6 SECONDS (ref 23–36)
BASE EXCESS BLDV CALC-SCNC: -7 MMOL/L
BLOOD TYPE BARCODE: 7300
BUN BLD-MCNC: 14 MG/DL (ref 9–23)
CALCIUM BLD-MCNC: 9 MG/DL (ref 8.7–10.6)
CHLORIDE SERPL-SCNC: 109 MMOL/L (ref 98–112)
CO2 SERPL-SCNC: 20 MMOL/L (ref 21–32)
CREAT BLD-MCNC: 1.68 MG/DL
EGFRCR SERPLBLD CKD-EPI 2021: 34 ML/MIN/1.73M2 (ref 60–?)
ERYTHROCYTE [DISTWIDTH] IN BLOOD BY AUTOMATED COUNT: 15.8 %
GLUCOSE BLD-MCNC: 160 MG/DL (ref 70–99)
HCO3 BLDV-SCNC: 18.7 MEQ/L (ref 22–26)
HCT VFR BLD AUTO: 37.2 %
HGB BLD-MCNC: 12.1 G/DL
INR BLD: 1.18 (ref 0.8–1.2)
ISTAT ACTIVATED CLOTTING TIME: 147 SECONDS (ref 74–137)
ISTAT ACTIVATED CLOTTING TIME: 256 SECONDS (ref 74–137)
ISTAT ACTIVATED CLOTTING TIME: 268 SECONDS (ref 74–137)
MCH RBC QN AUTO: 25.1 PG (ref 26–34)
MCHC RBC AUTO-ENTMCNC: 32.5 G/DL (ref 31–37)
MCV RBC AUTO: 77.2 FL
OSMOLALITY SERPL CALC.SUM OF ELEC: 292 MOSM/KG (ref 275–295)
OXYHGB MFR BLDV: 69.3 % (ref 72–78)
PCO2 BLDV: 40 MM HG (ref 38–50)
PH BLDV: 7.29 [PH] (ref 7.33–7.43)
PLATELET # BLD AUTO: 198 10(3)UL (ref 150–450)
PO2 BLDV: 40 MM HG (ref 30–50)
POTASSIUM SERPL-SCNC: 3.8 MMOL/L (ref 3.5–5.1)
PROTHROMBIN TIME: 15.1 SECONDS (ref 11.6–14.8)
RBC # BLD AUTO: 4.82 X10(6)UL
SODIUM SERPL-SCNC: 139 MMOL/L (ref 136–145)
UNIT VOLUME: 350 ML
WBC # BLD AUTO: 13.6 X10(3) UL (ref 4–11)

## 2025-04-03 PROCEDURE — 99233 SBSQ HOSP IP/OBS HIGH 50: CPT | Performed by: INTERNAL MEDICINE

## 2025-04-03 RX ORDER — POTASSIUM CHLORIDE 14.9 MG/ML
20 INJECTION INTRAVENOUS ONCE
Status: COMPLETED | OUTPATIENT
Start: 2025-04-03 | End: 2025-04-03

## 2025-04-03 RX ORDER — POLYETHYLENE GLYCOL 3350 17 G/17G
17 POWDER, FOR SOLUTION ORAL DAILY
Status: DISCONTINUED | OUTPATIENT
Start: 2025-04-03 | End: 2025-04-15

## 2025-04-03 RX ORDER — DOCUSATE SODIUM 100 MG/1
100 CAPSULE, LIQUID FILLED ORAL 2 TIMES DAILY
Status: DISCONTINUED | OUTPATIENT
Start: 2025-04-03 | End: 2025-04-15

## 2025-04-03 RX ORDER — BISACODYL 10 MG
10 SUPPOSITORY, RECTAL RECTAL ONCE
Status: COMPLETED | OUTPATIENT
Start: 2025-04-03 | End: 2025-04-03

## 2025-04-03 RX ORDER — SIMETHICONE 80 MG
160 TABLET,CHEWABLE ORAL
Status: DISCONTINUED | OUTPATIENT
Start: 2025-04-03 | End: 2025-04-04

## 2025-04-03 NOTE — CM/SW NOTE
Home Health referral sent via [employer]. Will provide list to patient when available.     BEBETO Mcdowell RN, CM  [phone]

## 2025-04-03 NOTE — PAYOR COMM NOTE
--------------  4/2 -3 CONTINUED STAY REVIEW    Payor: [employer] CHOICE/HMO/POS/EPO  Subscriber #:  [id number]  Authorization Number: [id number]    REMAINS IN ICU    4/2:     HOSPITALIST:    Patient went to the OR when I tried to evaluate patient.       Vital signs:  Temp:  [97.9 °F (36.6 °C)-98.6 °F (37 °C)] 98.6 °F (37 °C)  Pulse:  [47-64] 63  Resp:  [12-22] 15  SpO2:  [90 %-97 %] 94 %  AO: (115-129)/(46-52) 129/52     Physical Exam:    Patient was not examined today      Lab 03/30/25  0757 03/30/25  1631 03/30/25  1632 03/31/25  0320 04/01/25 0449 04/01/25 1739 04/02/25  0235   WBC 7.9  --  6.4 5.1 5.0 5.1 5.6   HGB 9.8*  --  10.0* 9.4* 9.3* 9.8* 9.1*   MCV 79.5*  --  78.4* 79.1* 78.9* 79.4* 79.6*   .0  --  268.0 249.0 228.0 228.0 227.0   INR 1.04 1.14  --  1.15  --  1.14  --       Lab 04/01/25  0449 04/01/25  1739 04/02/25  0235   * 102* 149*   BUN 13 14 16   CREATSERUM 1.54* 1.70* 1.88*   CA 9.0 8.8 8.8   ALB 3.8 3.9 3.5    135* 135*   K 4.3 4.7 4.2   * 114* 113*   CO2 18.0* 14.0* 15.0*   ALKPHO 55 58 56   AST 9 12 9   ALT <7* <7* 9*   BILT 0.2 0.2 0.2   TP 6.5 6.7 6.3       Assessment & Plan:  #Type B aortic Dissection  #L renal artery dissection  Vascular surgery following  Monitor in ICU  BP control  Continue cardene and esmolol gtt  Vascular surgery following  Pain control with PRN moprhine  Or today      #Prior hx of Type A aortic dissection  S/p repair 11/2024  Post op complications with rep failure and NAIMA needing trach and peg that are now removed     #Ess HTN  Resume meds  BP control    #Dyslipidemia  zetia     #CKD3     #TANYA     #Obesity, BMI 36       VASCULAR:    BRIEF OP REPORT:  Pre-Operative Diagnosis: arch dissection with aneurysm     Post-Operative Diagnosis: arch dissection with aneurysm      Procedure Performed:   LEFT CAROTID TO SUBCLAVIAN TRANSPOSITION, ENDOVASCULAR ARCH REPAIR. STENTING OF THE LEFT RENAL, LEFT ILIAC. PHYSICIAN MODIFIED GRAFT. WITH  NEUROMONITORING. SEE CATH LAB NOTES.     Surgical Findings: Endovascular repair of aortic arch performed with complete exclusion of aneurysm and resolution of dissection throughout. Left renal stent with bilateral iliac stents. Complete resolution of dissection. Wide patency of all viscerals at completion. No endoleak.     Estimated Blood Loss: 800cc    4/3:    CRITICAL CARE:    Breathing well.  Complains of pains and requiring Dilaudid pushes.     Levophed drip weaned off.  Hemoglobin stable      lactated ringers infusion, , Intravenous, Continuous    lactated ringers infusion, , Intravenous, Continuous    heparin (Porcine) 5000 UNIT/ML injection 5,000 Units, 5,000 Units, Subcutaneous, 2 times per day    norepinephrine (Levophed) 4 mg/250mL infusion premix, 0.5-30 mcg/min, Intravenous, Continuous PRN    ceFAZolin (Ancef) 2g in 10mL IV syringe premix, 2 g, Intravenous, Q8H       Physical Exam  Constitutional:       General: She is not in acute distress.     Appearance: Normal appearance. She is obese. She is not ill-appearing or diaphoretic.   HENT:      Head: Normocephalic and atraumatic.      Nose: Nose normal. No congestion or rhinorrhea.      Mouth/Throat:      Mouth: Mucous membranes are moist.      Pharynx: Oropharynx is clear. No oropharyngeal exudate or posterior oropharyngeal erythema.      Comments: Mallampati class IV palate   Eyes:      Extraocular Movements: Extraocular movements intact.      Pupils: Pupils are equal, round, and reactive to light.   Cardiovascular:      Rate and Rhythm: Normal rate.      Pulses: Normal pulses.      Heart sounds: Normal heart sounds. No murmur heard.  Pulmonary:      Effort: Pulmonary effort is normal. No respiratory distress.      Breath sounds: Normal breath sounds. No wheezing or rhonchi.      Comments: Diminished breath sound bilaterally  Chest:      Chest wall: No tenderness.   Abdominal:      General: Abdomen is flat. Bowel sounds are normal.      Palpations:  Abdomen is soft.   Musculoskeletal:         General: Normal range of motion.      Comments: SCD boots to lower extremities   Skin:     General: Skin is warm.   Neurological:      General: No focal deficit present.      Mental Status: She is alert and oriented to person, place, and time.   Psychiatric:         Mood and Affect: Mood normal.         Behavior: Behavior normal.         Thought Content: Thought content normal.         Judgment: Judgment normal.      Lab 04/02/25 0235 04/02/25 2226 04/03/25  0411   WBC 5.6 17.9* 13.6*   HGB 9.1* 12.6 12.1   HCT 29.7* 39.1 37.2   .0 236.0 198.0      Lab 04/01/25  0449 04/01/25  1739 04/02/25 0235 04/02/25 2226 04/03/25  0411    135* 135* 137 139   K 4.3 4.7 4.2 4.2 3.8   * 114* 113* 108 109   CO2 18.0* 14.0* 15.0* 18.0* 20.0*   BUN 13 14 16 14 14   CREATSERUM 1.54* 1.70* 1.88* 1.67* 1.68*   CA 9.0 8.8 8.8 9.4 9.0   ALB 3.8 3.9 3.5  --   --    ALKPHO 55 58 56  --   --    ALT <7* <7* 9*  --   --    AST 9 12 9  --   --    * 102* 149* 175* 160*       Assessment/plan:     Pulmonary:  Chest x-ray 4/3/2025 with bilateral mild interstitial edema:  Obstructive sleep apnea syndrome  Severe obesity BMI 36     Plan:  Continue CPAP at bedtime and as needed     Cardiovascular:  Type B aortic dissection: Status post 4/2/2025 LEFT CAROTID TO SUBCLAVIAN TRANSPOSITION, ENDOVASCULAR ARCH REPAIR.   Renal artery dissection: Status post STENTING OF THE LEFT RENAL, LEFT ILIAC. PHYSICIAN MODIFIED GRAFT   Aneurysmal degeneration of zone 3  Hx hypertension: Required Levophed drip to maintain SBP between 120 to 160 mmHg but drip weaned off later this morning  Sinus bradycardia: Improved  History of type A aortic dissection status postrepair in November 2024 at [facility] complicated by respiratory failure requiring tracheostomy and PEG tube placement.     Plan:  Pain management  Levophed drip to maintain systolic blood pressure 120 to 160 mmHg,:      Hematologic/coagulation  Anemia: Hemoglobin improved to normal range     Plan:  Monitor hemoglobin     Renal/FEN  Left renal artery dissection status post STENTING OF THE LEFT RENAL, LEFT ILIAC. PHYSICIAN MODIFIED GRAFT   Chronic kidney disease stage III  NAGMA  Hypochloremic metabolic acidosis        Plan  IV hydration with LR at 100 mm/h  Gentle diuresis when able     Endocrine/DEM  Dyslipidemia     Plan:  Continue Zetia 10 mg oral daily     Gastroenterology/hepatology   GERD     Plan:  Start oral diet as tolerated as okay per vascular surgery  Protonix 40 mg IV twice daily     Infectious disease:  No acute disease     Plan:  Monitor     Neurologic/psychiatry  No acute disease     Plan:  Monitor     Musculoskeletal/rheumatology  No acute disease     Plan:  Monitor        Prophylaxis:     DVT prophylaxis: SCD boots   GI prophylaxis: Protonix 40 mg  twice daily     Lines:  PIV, left radial A-line, introducer double-lumen  Catheters: Urethral  Feeding:: Oral     Disposition:  ICU monitoring       CODE STATUS: Full code        VASCULAR:    Objective:   Temp: 97.7 °F (36.5 °C)  Pulse: 87  Resp: 12  AO: 140/66      Events Yesterday/Overnight:  Patient is a 59 year old female, POD 1 left carotid to subclavian transposition, endovascular arch repair, stenting of the left renal and iliac arteries.  Patient is stable.  Blood pressure 132/61.  Levophed discontinued.  Hemoglobin 12.1.  Patient states she feels gas pain, no bowel movement since 3/30.      Exam:  Bilateral groins soft, no hematoma, no drainage present.  Palpable bilateral DP and PT pulses.  Neurologically intact.  Patient able to move the bilateral lower extremities.  Left neck SERVANDO drain to suction, sanguinous drainage.     Impression/Plan:     A-line and Webb to remain intact.     Started bowel regimen, scheduled Colace and MiraLAX.     Patient can sit up in the chair.     Pain management.    MEDICATIONS ADMINISTERED IN LAST 1 DAY:  Transfuse RBC       Date  Action Dose Route User    4/2/2025 1554 New Bag (none) (none) Tanner Coffey MD          Transfuse RBC       Date Action Dose Route User    4/2/2025 1804 New Bag (none) (none) Tanner Coffey MD          Transfuse RBC       Date Action Dose Route User    4/2/2025 1821 New Bag (none) (none) Tanner Coffey MD          Transfuse RBC       Date Action Dose Route User    4/2/2025 1848 New Bag (none) (none) Tanner Coffey MD          Transfuse fresh frozen plasma       Date Action Dose Route User    4/2/2025 1945 New Bag (none) (none) Tanner Coffey MD       calcium chloride injection       Date Action Dose Route User    4/2/2025 1827 Given 0.5 g Intravenous Tanner Coffey MD    4/2/2025 1822 Given 0.5 g Intravenous Tanner Coffey MD          ceFAZolin (Ancef) 2g in 10mL IV syringe premix       Date Action Dose Route User    4/2/2025 1929 Given 2 g Intravenous Tanner Coffey MD    4/2/2025 1544 Given 2 g Intravenous Tanner Coffey MD          ceFAZolin (Ancef) 2g in 10mL IV syringe premix       Date Action Dose Route User    4/3/2025 1104 New Bag 2 g Intravenous Beatrice Remy RN    4/3/2025 0312 New Bag 2 g Intravenous Beau, Mukund Grijalva RN     esmolol (Brevibloc) 2000 mg/100mL infusion premix       Date Action Dose Route User    4/2/2025 1436 Rate/Dose Change 50 mcg/kg/min × 100.8 kg Intravenous Tanner Coffey MD    4/2/2025 1429 Continued by Anesthesia 125 mcg/kg/min × 100.8 kg Intravenous Tanner Coffey MD    4/2/2025 1351 New Bag 125 mcg/kg/min × 100.8 kg Intravenous Beatrice Remy RN    4/2/2025 1334 New Bag 125 mcg/kg/min × 100.8 kg Intravenous Tanika Alvarado RN       furosemide (Lasix) 10 mg/mL injection 40 mg       Date Action Dose Route User    4/2/2025 2136 Given 40 mg Intravenous MallMukund martinez RN          furosemide (Lasix) 10 mg/mL injection       Date Action Dose Route User    4/2/2025 2136 Given 40 mg Intravenous Mallillin, Mukund Grijalva RN       heparin in  sodium chloride 0.9% ((Porcine)) 5000 UNITS - 500 ML for procedural use       Date Action Dose Route User    4/2/2025 1609 Given 5,000 Units Irrigation Frank Holder MD          heparin (Porcine) 1000 UNIT/ML injection       Date Action Dose Route User    4/2/2025 1629 Given 12,000 Units Intravenous Tanner Coffey MD          heparin (Porcine) 5000 UNIT/ML injection 5,000 Units       Date Action Dose Route User    4/3/2025 0508 Given 5,000 Units Subcutaneous (Right Lower Abdomen) Mukund Yanez RN          HYDROcodone-acetaminophen (Norco) 5-325 MG per tab 1 tablet       Date Action Dose Route User    4/3/2025 0619 Given 1 tablet Oral MallMukund martinez RN          HYDROmorphone (Dilaudid) 1 MG/ML injection 0.4 mg       Date Action Dose Route User    4/3/2025 0319 Given 0.4 mg Intravenous Mukund Yanez RN          HYDROmorphone (Dilaudid) 1 MG/ML injection 0.2 mg       Date Action Dose Route User    4/3/2025 0757 Given 0.2 mg Intravenous Beatrice Remy RN          HYDROmorphone (Dilaudid) 1 MG/ML injection 0.4 mg       Date Action Dose Route User    4/3/2025 0503 Given 0.4 mg Intravenous MallMukund martinez RN          HYDROmorphone (Dilaudid) 1 MG/ML injection 0.8 mg       Date Action Dose Route User    4/3/2025 0122 Given 0.8 mg Intravenous Mukund Yanez RN    4/2/2025 2136 Given 0.8 mg Intravenous MallMukund martinez RN     niCARdipine (carDENE) 100,000 mcg in dextrose 5% 1,000 mL infusion (non-standard concentration)       Date Action Dose Route User    4/2/2025 1429 Continued by Anesthesia (none) Intravenous Tanner Coffey MD    4/2/2025 1351 New Bag (none) Intravenous Beatrice Remy RN          lactated ringers infusion       Date Action Dose Route User    4/3/2025 0657 New Bag (none) Intravenous Mukund Yanez RN    4/2/2025 2223 New Bag (none) Intravenous Mukund Yanez RN       norepinephrine  (Levophed) 4 mg/250mL infusion premix       Date Action Dose Route User    4/2/2025 2014 Rate/Dose Change 2 mcg/min Intravenous Tanner Coffey MD    4/2/2025 1943 Rate/Dose Change 1 mcg/min Intravenous Tanner Coffey MD    4/2/2025 1839 Rate/Dose Change 2 mcg/min Intravenous Tanner Coffey MD    4/2/2025 1740 Rate/Dose Change 1 mcg/min Intravenous Tanner Cfofey MD    4/2/2025 1723 Rate/Dose Change 3 mcg/min Intravenous Tanner Coffey MD    4/2/2025 1641 Rate/Dose Change 4 mcg/min Intravenous Tanner Coffey MD    4/2/2025 1636 New Bag 2 mcg/min Intravenous Tanner Coffey MD          norepinephrine (Levophed) 4 mg/250mL infusion premix       Date Action Dose Route User    4/3/2025 0331 Restarted 2 mcg/min Intravenous Malljuan, Mukund Grijalva RN    4/3/2025 0230 Rate/Dose Change 2 mcg/min Intravenous Malljuan, Mukund Grijalva, RN    4/3/2025 0035 Rate/Dose Change 4 mcg/min Intravenous Beau, Mukund Grijalva, RN    4/2/2025 2345 Rate/Dose Change 5 mcg/min Intravenous Malljuan, Mukund Grijalva, RN    4/2/2025 2315 Rate/Dose Change 6 mcg/min Intravenous Mukund Yanez, RN    4/2/2025 2300 Rate/Dose Change 7 mcg/min Intravenous Mukund Yanez, RN    4/2/2025 2225 Rate/Dose Change 8 mcg/min Intravenous Malljuan, Mukund Grijalva, RN    4/2/2025 2145 Rate/Dose Change 7 mcg/min Intravenous Beau, Mukund Grijalva, RN    4/2/2025 2130 Rate/Dose Change 5 mcg/min Intravenous Beau, Mukund Grijalva, RN    4/2/2025 2115 Rate/Dose Change 3 mcg/min Intravenous Beau, Mukund Grijalva, RN    4/2/2025 2100 Restarted 2 mcg/min Intravenous Malljuan, Mukund Grijalva, CYNDIE          ondansetron (Zofran) 4 MG/2ML injection       Date Action Dose Route User    4/2/2025 2015 Given 4 mg Intravenous Tanner Coffey MD          ondansetron (Zofran) 4 MG/2ML injection 4 mg       Date Action Dose Route User    4/3/2025 0807 Given 4 mg Intravenous Beatrice Remy, RN    4/3/2025 0250 Given 4 mg  Intravenous Mukund Yanez RN     potassium chloride 20 mEq/100mL IVPB premix 20 mEq       Date Action Dose Route User    4/3/2025 0506 New Bag 20 mEq Intravenous Mukund Yanez RN       protamine 10 mg/mL injection       Date Action Dose Route User    4/2/2025 1957 Given 50 mg Intravenous Tanner Coffey MD       sodium bicarbonate 100 mEq in dextrose 5% 1,100 mL infusion       Date Action Dose Route User    4/2/2025 1429 Continued by Anesthesia (none) Intravenous Tanner Coffey MD          sodium bicarbonate injection       Date Action Dose Route User    4/2/2025 2010 Given 50 mL Intravenous Tanner Coffey MD    4/2/2025 1728 Given 25 mL Intravenous Tanner Coffey MD          sodium chloride 0.9% infusion       Date Action Dose Route User    4/2/2025 1434 New Bag (none) Intravenous Tanner Coffey MD       thrombin (Thrombin-JMI) 5000 units topical solution       Date Action Dose Route User    4/2/2025 1715 Given 10,000 Units Topical Frank Holder MD            Vitals (last day)       Date/Time Temp Pulse Resp BP SpO2 Weight O2 Device O2 Flow Rate (L/min) [facility]    04/03/25 1200 97.4 °F (36.3 °C) 94 18 -- 97 % -- None (Room air) -- KD    04/03/25 1152 -- 95 -- 134/65 -- -- -- -- CV    04/03/25 1100 -- 92 16 -- 100 % -- -- --     04/03/25 1000 -- 88 21 -- 99 % -- -- -- KD    04/03/25 0900 -- 86 15 -- 97 % -- -- --     04/03/25 0800 97 °F (36.1 °C) 87 14 -- 98 % -- None (Room air) --     04/03/25 0500 -- 87 12 -- 96 % -- -- --     04/03/25 0400 97.7 °F (36.5 °C) 85 13 -- 97 % -- None (Room air) 0 L/min     04/03/25 0300 -- 92 15 -- 97 % -- None (Room air) --     04/03/25 0200 -- 88 15 -- 96 % -- -- --     04/03/25 0100 -- 90 16 -- 100 % -- -- --     04/03/25 0000 97.5 °F (36.4 °C) 89 16 -- 96 % -- CPAP 2 L/min     04/02/25 2300 -- 90 22 -- 94 % -- -- --     04/02/25 2230 97.4 °F (36.3 °C) 89 27 -- 94 % -- -- --     04/02/25 2200 -- 91 19 -- 97 % -- -- --      04/02/25 2145 -- 88 21 -- 91 % -- CPAP 2 L/min     04/02/25 2130 97.5 °F (36.4 °C) 90 20 -- 94 % -- -- --     04/02/25 2120 -- 90 19 -- 92 % -- -- --     04/02/25 2115 -- 90 19 -- 92 % -- -- --     04/02/25 2110 -- 92 21 -- 93 % -- -- --     04/02/25 2105 -- 89 24 -- 96 % -- -- --     04/02/25 2100 97.3 °F (36.3 °C) 88 29 -- 97 % -- Simple mask 3 L/min     04/02/25 1400 -- 63 18 -- 90 % -- -- -- KD    04/02/25 1300 -- 62 13 -- 96 % -- -- --     04/02/25 1200 98.9 °F (37.2 °C) 62 13 -- 93 % -- None (Room air) -- KD    04/02/25 1100 -- 63 15 -- 94 % -- -- -- KD    04/02/25 1000 -- 64 18 -- 95 % -- -- -- KD    04/02/25 0900 -- 64 16 -- 93 % -- -- -- KD    04/02/25 0800 98.6 °F (37 °C) 62 14 -- 93 % -- None (Room air) -- KD    04/02/25 0700 -- 60 13 -- 93 % -- -- -- LZ    04/02/25 0600 -- 59 14 -- 94 % -- -- -- LZ    04/02/25 0500 -- 57 13 -- 96 % -- Nasal cannula 2 L/min LZ    04/02/25 0400 98.3 °F (36.8 °C) 56 12 -- 96 % -- CPAP -- LZ    04/02/25 0300 -- 57 16 -- 93 % -- -- -- LZ    04/02/25 0200 -- 53 15 -- 93 % -- -- -- LZ    04/02/25 0100 -- 49 16 -- 92 % -- -- -- LZ    04/02/25 0000 98 °F (36.7 °C) 48 20 -- 90 % -- CPAP -- LZ       Blood Transfusion Record       Product Unit Status Volume Start End            Transfuse RBC       25  [id number]  [id number] Completed 04/02/25 2105 350 mL 04/02/25 1848 04/02/25 1858       25  [id number]  [id number] Completed 04/02/25 2105 350 mL 04/02/25 1821 04/02/25 1831       25  [id number]  [id number] Completed 04/02/25 2105 350 mL 04/02/25 1804 04/02/25 1814       25  [id number]  [id number] Completed 04/02/25 2105 350 mL 04/02/25 1554 04/02/25 1604                Transfuse fresh frozen plasma       25  [id number]  [id number] Completed 04/02/25 2105 350 mL 04/02/25 1945 04/02/25 1955

## 2025-04-03 NOTE — DIETARY NOTE
Deferred Cardiac Education Note    Consult received for diet education per protocol. Education deferred until pt transferred out of CCU or until s/p intervention and when appropriate for teaching.       Stefany Argueta RD, LDN  Clinical Dietitian  P: [phone]     Patient/Caregiver provided printed discharge information.

## 2025-04-03 NOTE — PLAN OF CARE
Received pt from [facility] approx 2100. Received pt with oxy mask transitioned to cpap w/2L o2 bled in. Cpap removed dt episode of emesis, Remains on RA. NSR w/PVC. Levo gtt titrated to maintain SBP goals of 120-160. Pt Alert and oriented x2 upon arrival, mentation improved, now alert and oriented x4. Afebrile. Webb in place, POD1. C/o back pain, MD notfied, PRN pain medications given. Left radial [place] in place, Good waveform. Introducer w/TLC in place, Patent, Monitoring CVP. SERVANDO x1, Bloody output noted. Bilateral Groin sites assessed no complications.    See MAR and flowsheets for additional information.

## 2025-04-03 NOTE — ANESTHESIA POSTPROCEDURE EVALUATION
[facility]    Alisha Wilde Patient Status:  Inpatient   Age/Gender 59 year old female MRN [id number]   Location [facility] 4SW-A Attending Jade Cameron MD   Hosp Day # 3 PCP Bridger Avendano MD       Anesthesia Post-op Note    LEFT CAROTID TO SUBCLAVIAN TRANSPOSITION, ENDOVASCULAR ARCH REPAIR. STENTING OF THE LEFT RENAL, LEFT ILIAC. PHYSICIAN MODIFIED GRAFT. WITH NEUROMONITORING. SEE CATH LAB NOTES.    Procedure Summary       Date: 04/02/25 Room / Location:  [facility] 03 HYBRID /  [facility]    Anesthesia Start: 1429 Anesthesia Stop: 2105    Procedures:       LEFT CAROTID TO SUBCLAVIAN TRANSPOSITION, ENDOVASCULAR ARCH REPAIR. STENTING OF THE LEFT RENAL, LEFT ILIAC. PHYSICIAN MODIFIED GRAFT. WITH NEUROMONITORING. SEE CATH LAB NOTES. (Left)      ANGIOGRAM ADD-ON (Left) Diagnosis: (arch dissection with aneurysm)    Surgeons: Frank Holder MD Anesthesiologist: Tanner Coffey MD    Anesthesia Type: general ASA Status: 4            Anesthesia Type: general    Vitals Value Taken Time   /72 04/02/25 2105   Temp 97 04/02/25 2105   Pulse 89 04/02/25 2104   Resp 24 04/02/25 2104   SpO2 96 % 04/02/25 2104   Vitals shown include unfiled device data.        Patient Location: ICU    Anesthesia Type: general    Airway Patency: patent    Postop Pain Control: adequate    Mental Status: mildly sedated but able to meaningfully participate in the post-anesthesia evaluation    Nausea/Vomiting: none    Cardiopulmonary/Hydration status: stable euvolemic    Complications: no apparent anesthesia related complications    Postop vital signs: stable    Dental Exam: Unchanged from Preop    Sign out given to ICU staff.

## 2025-04-03 NOTE — PLAN OF CARE
Assumed care of pt at the change of shift. Pt A&Ox4, follows commands. PRECIADO. RA. BP within targeted goal. Pain managed with prn medication - see eMAR. Complaints of bloating/ constipation- prn medication given with some relief. Webb intact. Lines intact. See flowsheets for further information.

## 2025-04-03 NOTE — PROGRESS NOTES
[facility]   part of [facility]     Vascular Surgery Progress Note    Alisha Wilde Patient Status:  Inpatient    10/25/1965 MRN [id number]   Location [facility] 4SW-A Attending Jade Cameron MD   Hosp Day # 4 PCP Bridger Avendano MD     Objective:   Temp: 97.7 °F (36.5 °C)  Pulse: 87  Resp: 12  AO: 140/66      Events Yesterday/Overnight:  Patient is a 59 year old female, POD 1 left carotid to subclavian transposition, endovascular arch repair, stenting of the left renal and iliac arteries.  Patient is stable.  Blood pressure 132/61.  Levophed discontinued.  Hemoglobin 12.1.  Patient states she feels gas pain, no bowel movement since 3/30.      Exam:  Bilateral groins soft, no hematoma, no drainage present.  Palpable bilateral DP and PT pulses.  Neurologically intact.  Patient able to move the bilateral lower extremities.  Left neck SERVANDO drain to suction, sanguinous drainage.    Impression/Plan:    A-line and Webb to remain intact.    Started bowel regimen, scheduled Colace and MiraLAX.    Patient can sit up in the chair.    Pain management.      Medications:  Current Facility-Administered Medications   Medication Dose Route Frequency    glucose (Dex4) 15 GM/59ML oral liquid 15 g  15 g Oral Q15 Min PRN    Or    glucose (Glutose) 40% oral gel 15 g  15 g Oral Q15 Min PRN    Or    glucose-vitamin C (Dex-4) chewable tab 4 tablet  4 tablet Oral Q15 Min PRN    Or    dextrose 50% injection 50 mL  50 mL Intravenous Q15 Min PRN    Or    glucose (Dex4) 15 GM/59ML oral liquid 30 g  30 g Oral Q15 Min PRN    Or    glucose (Glutose) 40% oral gel 30 g  30 g Oral Q15 Min PRN    Or    glucose-vitamin C (Dex-4) chewable tab 8 tablet  8 tablet Oral Q15 Min PRN    lactated ringers infusion   Intravenous Continuous    acetaminophen (Tylenol) tab 650 mg  650 mg Oral Q4H PRN    Or    HYDROcodone-acetaminophen (Norco) 5-325 MG per tab 1 tablet  1 tablet Oral Q4H PRN    Or    HYDROcodone-acetaminophen (Norco) 5-325 MG per tab  2 tablet  2 tablet Oral Q4H PRN    ondansetron (Zofran) 4 MG/2ML injection 4 mg  4 mg Intravenous Q6H PRN    prochlorperazine (Compazine) 10 MG/2ML injection 5 mg  5 mg Intravenous Q8H PRN    lactated ringers infusion   Intravenous Continuous    heparin (Porcine) 5000 UNIT/ML injection 5,000 Units  5,000 Units Subcutaneous 2 times per day    HYDROmorphone (Dilaudid) 1 MG/ML injection 0.2 mg  0.2 mg Intravenous Q2H PRN    Or    HYDROmorphone (Dilaudid) 1 MG/ML injection 0.4 mg  0.4 mg Intravenous Q2H PRN    Or    HYDROmorphone (Dilaudid) 1 MG/ML injection 0.8 mg  0.8 mg Intravenous Q2H PRN    niCARdipine in sodium chloride 0.86% (carDENE) 20 mg/200mL infusion premix  5-15 mg/hr Intravenous Continuous PRN    norepinephrine (Levophed) 4 mg/250mL infusion premix  0.5-30 mcg/min Intravenous Continuous PRN    ceFAZolin (Ancef) 2g in 10mL IV syringe premix  2 g Intravenous Q8H       Laboratory/Data:    LABS:  Recent Labs   Lab 03/30/25  0757 03/30/25  1631 03/30/25  1632 03/31/25 0320 04/01/25 0449 04/01/25  1739 04/02/25  0235 04/02/25  2226 04/03/25  0411   WBC 7.9  --    < > 5.1 5.0 5.1 5.6 17.9* 13.6*   HGB 9.8*  --    < > 9.4* 9.3* 9.8* 9.1* 12.6 12.1   MCV 79.5*  --    < > 79.1* 78.9* 79.4* 79.6* 79.3* 77.2*   .0  --    < > 249.0 228.0 228.0 227.0 236.0 198.0   INR 1.04 1.14  --  1.15  --  1.14  --   --  1.18    < > = values in this interval not displayed.       Recent Labs   Lab 03/30/25  1631 03/31/25  0320 04/01/25  0449 04/01/25  1739 04/02/25  0235 04/02/25  2226 04/03/25  0411    138 141 135* 135* 137 139   K 3.2* 4.6  4.6 4.3 4.7 4.2 4.2 3.8   * 108 113* 114* 113* 108 109   CO2 18.0* 20.0* 18.0* 14.0* 15.0* 18.0* 20.0*   BUN 12 14 13 14 16 14 14   CREATSERUM 0.92 1.39* 1.54* 1.70* 1.88* 1.67* 1.68*   GLU 84 99 107* 102* 149* 175* 160*   CA 7.4* 9.1 9.0 8.8 8.8 9.4 9.0   MG  --   --  2.3  --  2.2  --   --    PHOS 2.5 4.5 3.7  --  4.2  --   --      Recent Labs   Lab 03/30/25  0757  03/30/25  1631 03/31/25  0320 04/01/25  1739 04/03/25  0411   PTP 14.2   < > 14.8 14.8 15.1*   INR 1.04   < > 1.15 1.14 1.18   PTT 29.6  --   --  31.1 27.6    < > = values in this interval not displayed.     Recent Labs   Lab 03/30/25  1631 03/31/25  0320 04/01/25  0449 04/01/25  1739 04/02/25  0235   ALT <7* <7* <7* <7* 9*   AST <8 <8 9 12 9   ALB 3.3 3.9 3.8 3.9 3.5     No results for input(s): \"TROP\" in the last 168 hours.  Lab Results   Component Value Date    ANASCRN Negative 06/05/2021     No results for input(s): \"PCACT\", \"PSACT\", \"AT3ACT\", \"HIPAB\", \"PATHI\", \"STALA\", \"DRVVTRATIO\", \"DRVVT\", \"STACLOT\", \"CARIGG\", \"T3LX0KLPJF\", \"V4KF8UALYA\", \"RA\", \"HAVIGM\", \"HBCIGM\", \"HCVAB\", \"HBSAG\", \"HBCAB\", \"HBVDNAINTERP\", \"ANAS\", \"C3\", \"C4\" in the last 168 hours.    STACEY Murphy  4/3/2025  7:53 AM

## 2025-04-03 NOTE — BRIEF OP NOTE
Pre-Operative Diagnosis: arch dissection with aneurysm     Post-Operative Diagnosis: arch dissection with aneurysm      Procedure Performed:   LEFT CAROTID TO SUBCLAVIAN TRANSPOSITION, ENDOVASCULAR ARCH REPAIR. STENTING OF THE LEFT RENAL, LEFT ILIAC. PHYSICIAN MODIFIED GRAFT. WITH NEUROMONITORING. SEE CATH LAB NOTES.    Co-Surgeons:     * Frank Holder MD       * Magdy Palm MD        Surgical Findings: Endovascular repair of aortic arch performed with complete exclusion of aneurysm and resolution of dissection throughout. Left renal stent with bilateral iliac stents. Complete resolution of dissection. Wide patency of all viscerals at completion. No endoleak.     Specimen: None     Estimated Blood Loss: 800cc       Magdy Palm MD  4/2/2025  8:17 PM

## 2025-04-03 NOTE — SPIRITUAL CARE NOTE
Spiritual Care Visit Note    Patient Name: Alisha Wilde Date of Spiritual Care Visit: 25   : 10/25/1965 Primary Dx: <principal problem not specified>       Referred By: Referral From: Other (Comment) (LOS)    Spiritual Care Taxonomy:    Intended Effects: Build relationship of care and support    Methods: Collaborate with care team member, Encourage self care, Encourage sharing of feelings, Explore spiritual/Baptism beliefs, Offer emotional support, Offer spiritual/Baptism support    Interventions: Acknowledge current situation, Active listening, Ask guided questions about cultural and Baptism values, Explain  role, Prayer for healing    Visit Type/Summary:     - Spiritual Care:  provided visit to introduce the services of the Spiritual Care Dept. Consulted with RN prior to visit. At time of visit, patient was visiting with her sister at bedside.Offered empathic listening and emotional support. Patient shared about her health journey and hope for recovery. Alisha shared she has a [employer] yeoy, attending [facility] [employer] and they are providing her emotional/spiritual support. Provided support for patients spiritual/Baptism requests. Offered prayer. Alisha expressed appreciation for  visit.  remains available for follow up.    Spiritual Care support can be requested via an Epic consult. For urgent/immediate needs, please contact the On Call  at: Real: ext [phone]    Rev Charlee Penny MA

## 2025-04-03 NOTE — PLAN OF CARE
Assumed care of pt at the change of shift. Pt A&Ox4, follows commands. On RA. NSR on tele. No pain. No distress. Art line intact. Cardene gtt and esmolol gtt titrated for sbp < 130- see eMAR. No BM. Webb in place. NPO. Went for surgery this evening- care endorsed. See flowsheets for further information.

## 2025-04-03 NOTE — PROGRESS NOTES
Patient was treated overnight with 40 mg of IV Lasix she had a fairly robust response approximately 2-1/2 L CVP now in the upper single digits and the patient responding well  Norepinephrine weaned off through the night  .  Patient did have back pain as she was more lucid with anesthesia which did require every 2 opiates however hemoglobin stable.  Bicarbonate is at 20 previously she had hyperchloremic metabolic acidosis which appears to be improving.  Venous blood gas this morning from [facility] shows a adequate cardiac output with venous saturation around 70 pH 7.29 but this is a venous gas likely indicating still a nongap acidosis but patient very close to being not acidemic.  Overall patient doing well pain control needs to be recognized awaiting vascular surgery recommendations in the morning

## 2025-04-03 NOTE — PROGRESS NOTES
[facility]  Pulmonary/Critical Care Progress note    Alisha Wilde Patient Status:  Inpatient    10/25/1965 MRN [id number]   Location [facility] 4SW-A Attending Sumit Kapoor MD   Hosp Day # 4 PCP Bridger Avendano MD       History of Present Illness:    Breathing well.  Complains of pains and requiring Dilaudid pushes.    Levophed drip weaned off.  Hemoglobin stable        History:  Past Medical History:    Chronic kidney disease (CKD)    Esophageal reflux    High blood pressure    High cholesterol    HYPERTENSION    Hypertension    Unspecified essential hypertension     Past Surgical History:   Procedure Laterality Date    Anesth,open heart surgery  2024    Colonoscopy N/A 2018    Procedure: COLONOSCOPY;  Surgeon: Magdy Webber MD;  Location: [facility] ENDOSCOPY    Endometrial ablation      child passed away for 5 mins          1    Other surgical history  2011    cysto-Dr. Lacey -- pt denies     Family History   Problem Relation Age of Onset    Hypertension Mother     Heart Disorder Mother 70    Other (Other) Mother         kidney failure    Hypertension Father     Hypertension Maternal Grandfather     Cancer Neg     Diabetes Neg     Glaucoma Neg     Macular degeneration Neg       reports that she quit smoking about 26 years ago. Her smoking use included cigarettes. She started smoking about 39 years ago. She has a 13 pack-year smoking history. She has quit using smokeless tobacco. She reports that she does not currently use alcohol after a past usage of about 1.0 - 2.0 standard drink of alcohol per week. She reports that she does not use drugs.    Allergies:  Allergies[1]    Medications:    Current Facility-Administered Medications:     glucose (Dex4) 15 GM/59ML oral liquid 15 g, 15 g, Oral, Q15 Min PRN **OR** glucose (Glutose) 40% oral gel 15 g, 15 g, Oral, Q15 Min PRN **OR** glucose-vitamin C (Dex-4) chewable tab 4 tablet, 4 tablet, Oral, Q15 Min PRN **OR** dextrose  50% injection 50 mL, 50 mL, Intravenous, Q15 Min PRN **OR** glucose (Dex4) 15 GM/59ML oral liquid 30 g, 30 g, Oral, Q15 Min PRN **OR** glucose (Glutose) 40% oral gel 30 g, 30 g, Oral, Q15 Min PRN **OR** glucose-vitamin C (Dex-4) chewable tab 8 tablet, 8 tablet, Oral, Q15 Min PRN    lactated ringers infusion, , Intravenous, Continuous    acetaminophen (Tylenol) tab 650 mg, 650 mg, Oral, Q4H PRN **OR** HYDROcodone-acetaminophen (Norco) 5-325 MG per tab 1 tablet, 1 tablet, Oral, Q4H PRN **OR** HYDROcodone-acetaminophen (Norco) 5-325 MG per tab 2 tablet, 2 tablet, Oral, Q4H PRN    ondansetron (Zofran) 4 MG/2ML injection 4 mg, 4 mg, Intravenous, Q6H PRN    prochlorperazine (Compazine) 10 MG/2ML injection 5 mg, 5 mg, Intravenous, Q8H PRN    lactated ringers infusion, , Intravenous, Continuous    heparin (Porcine) 5000 UNIT/ML injection 5,000 Units, 5,000 Units, Subcutaneous, 2 times per day    HYDROmorphone (Dilaudid) 1 MG/ML injection 0.2 mg, 0.2 mg, Intravenous, Q2H PRN **OR** HYDROmorphone (Dilaudid) 1 MG/ML injection 0.4 mg, 0.4 mg, Intravenous, Q2H PRN **OR** HYDROmorphone (Dilaudid) 1 MG/ML injection 0.8 mg, 0.8 mg, Intravenous, Q2H PRN    niCARdipine in sodium chloride 0.86% (carDENE) 20 mg/200mL infusion premix, 5-15 mg/hr, Intravenous, Continuous PRN    norepinephrine (Levophed) 4 mg/250mL infusion premix, 0.5-30 mcg/min, Intravenous, Continuous PRN    ceFAZolin (Ancef) 2g in 10mL IV syringe premix, 2 g, Intravenous, Q8H    Intake/Output:    Intake/Output Summary (Last 24 hours) at 4/3/2025 0749  Last data filed at 4/3/2025 0701  Gross per 24 hour   Intake 4991.52 ml   Output 5130 ml   Net -138.48 ml      Body mass index is 37.9 kg/m².    Review of Systems  Review of Systems:   A comprehensive 10 point review of systems was completed.  Pertinent positives and negatives noted in the the HPI.         Vitals:    04/03/25 0200 04/03/25 0300 04/03/25 0400 04/03/25 0500   BP:       Pulse: 88 92 85 87   Resp: 15 15 13  12   Temp:   97.7 °F (36.5 °C)    TempSrc:   Temporal    SpO2: 96% 97% 97% 96%   Weight:             Physical Exam  Constitutional:       General: She is not in acute distress.     Appearance: Normal appearance. She is obese. She is not ill-appearing or diaphoretic.   HENT:      Head: Normocephalic and atraumatic.      Nose: Nose normal. No congestion or rhinorrhea.      Mouth/Throat:      Mouth: Mucous membranes are moist.      Pharynx: Oropharynx is clear. No oropharyngeal exudate or posterior oropharyngeal erythema.      Comments: Mallampati class IV palate   Eyes:      Extraocular Movements: Extraocular movements intact.      Pupils: Pupils are equal, round, and reactive to light.   Cardiovascular:      Rate and Rhythm: Normal rate.      Pulses: Normal pulses.      Heart sounds: Normal heart sounds. No murmur heard.  Pulmonary:      Effort: Pulmonary effort is normal. No respiratory distress.      Breath sounds: Normal breath sounds. No wheezing or rhonchi.      Comments: Diminished breath sound bilaterally  Chest:      Chest wall: No tenderness.   Abdominal:      General: Abdomen is flat. Bowel sounds are normal.      Palpations: Abdomen is soft.   Musculoskeletal:         General: Normal range of motion.      Comments: SCD boots to lower extremities   Skin:     General: Skin is warm.   Neurological:      General: No focal deficit present.      Mental Status: She is alert and oriented to person, place, and time.   Psychiatric:         Mood and Affect: Mood normal.         Behavior: Behavior normal.         Thought Content: Thought content normal.         Judgment: Judgment normal.            Lab Data Review:  Recent Labs   Lab 04/02/25  0235 04/02/25 2226 04/03/25  0411   WBC 5.6 17.9* 13.6*   HGB 9.1* 12.6 12.1   HCT 29.7* 39.1 37.2   .0 236.0 198.0       Recent Labs   Lab 04/01/25  0449 04/01/25  1739 04/02/25  0235 04/02/25 2226 04/03/25  0411    135* 135* 137 139   K 4.3 4.7 4.2 4.2 3.8   CL  113* 114* 113* 108 109   CO2 18.0* 14.0* 15.0* 18.0* 20.0*   BUN 13 14 16 14 14   CREATSERUM 1.54* 1.70* 1.88* 1.67* 1.68*   CA 9.0 8.8 8.8 9.4 9.0   ALB 3.8 3.9 3.5  --   --    ALKPHO 55 58 56  --   --    ALT <7* <7* 9*  --   --    AST 9 12 9  --   --    * 102* 149* 175* 160*       Recent Labs   Lab 04/01/25  0449 04/02/25  0235   MG 2.3 2.2       Lab Results   Component Value Date    PHOS 4.2 04/02/2025        Recent Labs   Lab 03/30/25  0757 03/30/25  1631 03/31/25  0320 04/01/25  1739 04/03/25  0411   INR 1.04   < > 1.15 1.14 1.18   PTT 29.6  --   --  31.1 27.6    < > = values in this interval not displayed.       Recent Labs   Lab 04/01/25 2034 04/02/25  1357   ABGPHT 7.31* 7.35   [id number] 27* 29*   EXJWK0Z 65* 72*   ABGHCO3 16.0* 18.3*   ABGBE -11.3* -8.5*   TEMP 98.6 98.6   ANDIE Not Applicable Not Applicable   SITE Arterial Line Arterial Line   DEV Continuous Mask CPAP Room Air   THGB 9.8* 9.7*       No results for input(s): \"TROP\", \"CKMB\" in the last 168 hours.    Cultures: No results found for this visit on 03/30/25.        Radiology personally reviewed:  XR CHEST AP PORTABLE  (CPT=71045)    Result Date: 4/2/2025  CONCLUSION:  Stable cardiomegaly with interstitial opacities likely representing edema.   LOCATION:  [facility]      Dictated by (CST): Armando Frias MD on 4/02/2025 at 6:40 AM     Finalized by (CST): Armando rFias MD on 4/02/2025 at 6:41 AM                     Patient Active Problem List   Diagnosis    Hypercholesteremia    Benign essential HTN    Vitamin D deficiency    Adjustment disorder with mixed anxiety and depressed mood    Controlled type 2 diabetes mellitus without complication, without long-term current use of insulin (HCC)    Obesity (BMI 30-39.9)    Polyp of colon    Flat feet, bilateral    Myalgia due to statin    Dissection of ascending aorta (HCC)    Stage 3 chronic kidney disease (HCC)    Iron deficiency    Constipation    Leg swelling    Gastroesophageal reflux disease     Dissection of aorta, unspecified portion of aorta (HCC)    Aneurysm of aortic arch     59-year-old female presented to the emergency room at [facility] on 3/30/2025 with history of type a aortic dissection s/p repair on 11/21/2024 with complicated course with respiratory failure and renal failure required prolonged intubation and then trach and PEG and subsequently did well in rehab and she was decannulated and also PEG tube was removed  Patient also has history of TANYA, HTN, HL, CKD , obesity with a BMI of 35, GERD     Assessment/plan:    Pulmonary:  Chest x-ray 4/3/2025 with bilateral mild interstitial edema:  Obstructive sleep apnea syndrome  Severe obesity BMI 36    Plan:  Continue CPAP at bedtime and as needed    Cardiovascular:  Type B aortic dissection: Status post 4/2/2025 LEFT CAROTID TO SUBCLAVIAN TRANSPOSITION, ENDOVASCULAR ARCH REPAIR.   Renal artery dissection: Status post STENTING OF THE LEFT RENAL, LEFT ILIAC. PHYSICIAN MODIFIED GRAFT   Aneurysmal degeneration of zone 3  Hx hypertension: Required Levophed drip to maintain SBP between 120 to 160 mmHg but drip weaned off later this morning  Sinus bradycardia: Improved  History of type A aortic dissection status postrepair in November 2024 at [facility] complicated by respiratory failure requiring tracheostomy and PEG tube placement.    Plan:  Pain management  Levophed drip to maintain systolic blood pressure 120 to 160 mmHg,:    Hematologic/coagulation  Anemia: Hemoglobin improved to normal range    Plan:  Monitor hemoglobin    Renal/FEN  Left renal artery dissection status post STENTING OF THE LEFT RENAL, LEFT ILIAC. PHYSICIAN MODIFIED GRAFT   Chronic kidney disease stage III  NAGMA  Hypochloremic metabolic acidosis      Plan  IV hydration with LR at 100 mm/h  Gentle diuresis when able    Endocrine/DEM  Dyslipidemia    Plan:  Continue Zetia 10 mg oral daily    Gastroenterology/hepatology   GERD    Plan:  Start oral diet as tolerated as  okay per vascular surgery  Protonix 40 mg IV twice daily    Infectious disease:  No acute disease    Plan:  Monitor    Neurologic/psychiatry  No acute disease    Plan:  Monitor    Musculoskeletal/rheumatology  No acute disease    Plan:  Monitor      Prophylaxis:    DVT prophylaxis: SCD boots   GI prophylaxis: Protonix 40 mg  twice daily    Lines:  PIV, left radial A-line, introducer double-lumen  Catheters: Urethral  Feeding:: Oral    Disposition:  ICU monitoring       CODE STATUS: Full code           Mj Guerra MD       Note to the patient: The 21st Century Cures Act makes medical notes like these available to patients in the interest of transparency. However, be advised that this is a medical document. It is intended as peer to peer communication. It is written in medical language and may contain abbreviations or verbiage that are unfamiliar. It may appear blunt or direct. Medical documents are intended to carry relevant information, facts as evident, and clinical opinion of the practitioner.      Disclaimer: Components of this note were documented using voice recognition system and are subject to errors not corrected at proofreading. Contact the author of this note for any clarifications           [1]   Allergies  Allergen Reactions    Atorvastatin MYALGIA    Pravastatin MYALGIA    Rosuvastatin MYALGIA    Seasonal Runny nose

## 2025-04-03 NOTE — PHYSICAL THERAPY NOTE
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  Prior to admission, patient's baseline is mod I for adl's, gait w/ cane and drives.  Patient is currently functioning below baseline with bed mobility, transfers, and gait.  Patient is requiring contact guard assist and minimal assist as a result of the following impairments: decreased functional strength, impaired standing balance, decreased muscular endurance, medical status, and L ue weakness/decreased rom from baseline .  Physical Therapy will continue to follow for duration of hospitalization.    Patient will benefit from continued skilled PT Services at discharge to promote prior level of function and safety with additional support and return home with home health PT.    PLAN DURING HOSPITALIZATION  Nursing Mobility Recommendation :  (2 assist due to equipment/dizziness)  PT Device Recommendation: Rolling walker  PT Treatment Plan: Bed mobility, Endurance, Patient education, Family education, Gait training, Range of motion, Transfer training, Balance training  Rehab Potential : Good  Frequency (Obs): 3-5x/week     CURRENT GOALS    Goal #1 Patient is able to demonstrate supine - sit EOB @ level: modified independent     Goal #2 Patient is able to demonstrate transfers EOB to/from BSC at assistance level: modified independent     Goal #3 Patient is able to ambulate 250 feet with assist device:  least restrictive device  at assistance level: supervision     Goal #4    Goal #5    Goal #6    Goal Comments: Goals established on  4/3/2025      PHYSICAL THERAPY MEDICAL/SOCIAL HISTORY  History related to current admission: Patient is a 59 year old female admitted on 3/30/2025 from home for several week c/o of chest and subscapular pain.  Pt diagnosed with aortic arch aneurysm.  Pt is now s/p LEFT CAROTID TO SUBCLAVIAN TRANSPOSITION, ENDOVASCULAR ARCH REPAIR. STENTING OF THE LEFT RENAL, LEFT ILIAC - per chart on 4/2    HOME SITUATION  Type of Home: Condo  Home Layout: One level  Stairs to Enter : 0        Stairs to Bedroom: 0         Lives With: Alone    Drives: Yes          Prior Level of [place]: Pt is typically independent w/ adl's, gait w/ cane, drives and is currently on short term disability w/ [employer].      SUBJECTIVE  \"I feel so weak.\"    OBJECTIVE     Fall Risk: High fall risk    WEIGHT BEARING RESTRICTION     PAIN ASSESSMENT  Rating: Unable to rate  Location: Denied pain during session       COGNITION  Overall Cognitive Status:  WFL - within functional limits  Slightly delayed processing time    RANGE OF MOTION AND STRENGTH ASSESSMENT  Upper extremity ROM - wfl actively on R ue, passively on L  R UE strength WFL, L  and wrist strength wfl   R biceps 2+/5, triceps 3/5, shoulder flexion 2-/5    Lower extremity ROM is within functional limits     Lower extremity strength is within functional limits     BALANCE  Static Sitting: Good  Dynamic Sitting: Fair -  Static Standing: Poor +  Dynamic Standing: Poor +    ADDITIONAL TESTS                                    ACTIVITY TOLERANCE  Pulse: 95  Heart Rate Source: Monitor     BP: 134/65  BP Location: Radial  BP Method: Invasive  Patient Position: Sitting    O2 WALK  Oxygen Therapy  SPO2% on Room Air at Rest: 97    NEUROLOGICAL FINDINGS                        AM-PAC '6-Clicks' INPATIENT SHORT FORM - BASIC MOBILITY  How much difficulty does the patient currently have...  Patient Difficulty: Turning over in bed (including adjusting bedclothes, sheets and blankets)?: A Little   Patient  Difficulty: Sitting down on and standing up from a chair with arms (e.g., wheelchair, bedside commode, etc.): A Little   Patient Difficulty: Moving from lying on back to sitting on the side of the bed?: A Little   How much help from another person does the patient currently need...   Help from Another: Moving to and from a bed to a chair (including a wheelchair)?: A Little   Help from Another: Need to walk in hospital room?: A Lot   Help from Another: Climbing 3-5 steps with a railing?: A Lot     AM-PAC Score:  Raw Score: 16   Approx Degree of Impairment: 54.16%   Standardized Score (AM-PAC Scale): 40.78   CMS Modifier (G-Code): CK    FUNCTIONAL ABILITY STATUS  Gait Assessment   Functional Mobility/Gait Assessment  Gait Assistance: Minimum assistance  Distance (ft): 3  Assistive Device: Rolling walker  Pattern: Shuffle    Skilled Therapy Provided     Bed Mobility:  Rolling: Right w/ hob highly elevated - CGA  Supine to sit: CGA w/ hob highly elevated.  Pt noted dizziness upon sitting, bp's staying steady.  Pt given several minutes to adjust to position change.   Sit to supine: NT     Transfer Mobility:  Sit to stand: Cues for b ue arm placement, Pt's weakness L ue prevents her from reaching for walker.  Needed assist to move her L hand onto appropriate spot on walker. Min a of 1 for anti-gravity motion and L ue placement.  Stand to sit: Cues for safety technique - CGA  Gait = Pt completed gait 3'x1 w/ rw and min a of 2 for safety - less for physical assist and more due to pt's ongoing c/o of dizziness.    Therapist's Comments: Pt reports feeling comfortable in bedside recliner at end of session.  Pt educated on hourly ankle pumps to assist w/ DVT prevention.    Exercise/Education Provided:  Bed mobility  Functional activity tolerated  Gait training  Transfer training    Patient End of Session: Up in chair, Needs met, Call light within reach, RN aware of session/findings, All patient questions and concerns addressed,  Family present (Rn Delia aware of eval session)      Patient Evaluation Complexity Level:  History Moderate - 1 or 2 personal factors and/or co-morbidities   Examination of body systems Moderate - addressing a total of 3 or more elements   Clinical Presentation  Moderate - Evolving   Clinical Decision Making Moderate Complexity       PT Session Time: 25 minutes  Gait Training: 3 minutes  Therapeutic Activity: 12 minutes  Neuromuscular Re-education: 0 minutes  Therapeutic Exercise: 1 minutes

## 2025-04-04 ENCOUNTER — APPOINTMENT (OUTPATIENT)
Dept: GENERAL RADIOLOGY | Facility: HOSPITAL | Age: 60
End: 2025-04-04
Attending: INTERNAL MEDICINE
Payer: COMMERCIAL

## 2025-04-04 LAB
ALBUMIN SERPL-MCNC: 3.7 G/DL (ref 3.2–4.8)
ALBUMIN/GLOB SERPL: 1.4 {RATIO} (ref 1–2)
ALP LIVER SERPL-CCNC: 62 U/L
ALT SERPL-CCNC: <7 U/L
ANION GAP SERPL CALC-SCNC: 9 MMOL/L (ref 0–18)
AST SERPL-CCNC: 11 U/L (ref ?–34)
BASOPHILS # BLD AUTO: 0.03 X10(3) UL (ref 0–0.2)
BASOPHILS NFR BLD AUTO: 0.2 %
BILIRUB SERPL-MCNC: 0.5 MG/DL (ref 0.2–1.1)
BLOOD TYPE BARCODE: 5100
BLOOD TYPE BARCODE: 7300
BUN BLD-MCNC: 13 MG/DL (ref 9–23)
CALCIUM BLD-MCNC: 8.9 MG/DL (ref 8.7–10.6)
CHLORIDE SERPL-SCNC: 105 MMOL/L (ref 98–112)
CO2 SERPL-SCNC: 23 MMOL/L (ref 21–32)
CREAT BLD-MCNC: 1.41 MG/DL
EGFRCR SERPLBLD CKD-EPI 2021: 42 ML/MIN/1.73M2 (ref 60–?)
EOSINOPHIL # BLD AUTO: 0.02 X10(3) UL (ref 0–0.7)
EOSINOPHIL NFR BLD AUTO: 0.1 %
ERYTHROCYTE [DISTWIDTH] IN BLOOD BY AUTOMATED COUNT: 16 %
GLOBULIN PLAS-MCNC: 2.6 G/DL (ref 2–3.5)
GLUCOSE BLD-MCNC: 112 MG/DL (ref 70–99)
HCT VFR BLD AUTO: 34.6 %
HGB BLD-MCNC: 11.4 G/DL
IMM GRANULOCYTES # BLD AUTO: 0.11 X10(3) UL (ref 0–1)
IMM GRANULOCYTES NFR BLD: 0.6 %
LYMPHOCYTES # BLD AUTO: 1.22 X10(3) UL (ref 1–4)
LYMPHOCYTES NFR BLD AUTO: 7.2 %
MCH RBC QN AUTO: 25.6 PG (ref 26–34)
MCHC RBC AUTO-ENTMCNC: 32.9 G/DL (ref 31–37)
MCV RBC AUTO: 77.8 FL
MONOCYTES # BLD AUTO: 2.17 X10(3) UL (ref 0.1–1)
MONOCYTES NFR BLD AUTO: 12.8 %
NEUTROPHILS # BLD AUTO: 13.4 X10 (3) UL (ref 1.5–7.7)
NEUTROPHILS # BLD AUTO: 13.4 X10(3) UL (ref 1.5–7.7)
NEUTROPHILS NFR BLD AUTO: 79.1 %
OSMOLALITY SERPL CALC.SUM OF ELEC: 285 MOSM/KG (ref 275–295)
PLATELET # BLD AUTO: 188 10(3)UL (ref 150–450)
POTASSIUM SERPL-SCNC: 3.9 MMOL/L (ref 3.5–5.1)
PROT SERPL-MCNC: 6.3 G/DL (ref 5.7–8.2)
RBC # BLD AUTO: 4.45 X10(6)UL
SODIUM SERPL-SCNC: 137 MMOL/L (ref 136–145)
UNIT VOLUME: 241 ML
UNIT VOLUME: 350 ML
WBC # BLD AUTO: 17 X10(3) UL (ref 4–11)

## 2025-04-04 PROCEDURE — 71045 X-RAY EXAM CHEST 1 VIEW: CPT | Performed by: INTERNAL MEDICINE

## 2025-04-04 PROCEDURE — 99233 SBSQ HOSP IP/OBS HIGH 50: CPT | Performed by: INTERNAL MEDICINE

## 2025-04-04 RX ORDER — SIMETHICONE 80 MG
160 TABLET,CHEWABLE ORAL 4 TIMES DAILY PRN
Status: DISCONTINUED | OUTPATIENT
Start: 2025-04-04 | End: 2025-04-15

## 2025-04-04 RX ORDER — DOXEPIN HYDROCHLORIDE 50 MG/1
1 CAPSULE ORAL DAILY
Status: DISCONTINUED | OUTPATIENT
Start: 2025-04-05 | End: 2025-04-15

## 2025-04-04 RX ORDER — BISACODYL 10 MG
10 SUPPOSITORY, RECTAL RECTAL
Status: DISCONTINUED | OUTPATIENT
Start: 2025-04-04 | End: 2025-04-15

## 2025-04-04 NOTE — OPERATIVE REPORT
Vascular Surgery Procedure Note  Alisha Wilde  [id number]  10/25/1965          Date:  03/30/2025        Procedure: Physician Planning of a patient specific fenestrated aortic graft requiring a minimum of 90 minutes of physician time. 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  She had previous type B dissection in November that required prolonged hospitalization with trach and PEG.  She was at prohibitively high risk for open surgery.  As such,  a physician modified fenestrated endovascular aortic repair were discussed. She strongly desired to proceed with a minimally invasive approach.     After review of CT scan, detailed three-dimensional reconstruction was performed with WEALTH at work it was determined that the patient would not have adequate seal zone without extending into the previous dacryon graft in the ascending aorta requiring fenestrations for the great vessels.  The measurements were recorded with the plan to perform on 04/02/2025. Over 90 minutes was used in measuring and creating the endograft for this patient. See operative dictation for procedure details.      Magdy Palm MD

## 2025-04-04 NOTE — DIETARY MALNUTRITION NOTE
[facility]   part of [facility]  NUTRITION ASSESSMENT    Pt meets moderate malnutrition criteria at this time.    CRITERIA FOR MALNUTRITION DIAGNOSIS:  Criteria for non-severe malnutrition diagnosis: chronic illness related to wt loss 7.5% in 3 months and energy intake less than75% for greater than 1 month    NUTRITION INTERVENTION:    RD nutrition Care Plan- Initiated ONS (oral nutritional supplements) and Ordered multivitamin  Meal and Snacks - Continue 2 gm Na diet as tolerated; monitor patient po intake. Encourage adequate po of appropriate diet.  Medical Food Supplements - RD added Ensure Plus HP strawberry BID. Rationale/use for oral supplements discussed.  Vitamin and Mineral Supplements - Recommend adding Multivitamin with minerals    PATIENT STATUS: 59 year old female admitted on 3/30 presents with type B aortic dissection s/p left carotid to subclavian transposition, endovascular arch repair, stenting of the left renal and iliac arteries on 4/2. Pt screened d/t LOS. Visited pt at bedside. Pt reports her appetite is \"not that great\" which has been ongoing since her first vascular surgery in November. She reports having upset stomach/constipation at baseline and takes dulcolax; last BM today. No chewing or swallowing difficulties and NKFA. Reports her UBW was ~240 lbs back in November and reports her most recent wt is ~220 lbs. Reports drinking 1 Ensure yesterday and agreeable to continue strawberry flavor. Continued to encourage PO and ONS intake; all questions answered at this time.    PMH: CKD, Esophageal reflux, High blood pressure, High cholesterol, HYPERTENSION, Hypertension, and Unspecified essential hypertension.    ANTHROPOMETRICS:  Ht:  5'5\"  Wt: 113.4 kg (250 lb).   BMI: Body mass index is 41.6 kg/m².  IBW: 56.8 kg    WEIGHT HISTORY: Using admit wt, pt with ~18 lb wt loss x 3 months (7.5%, significant per standards).  Patient Weight(s) for the past 336 hrs:   Weight   04/04/25 0300 113.4  kg (250 lb)   03/31/25 0400 103.3 kg (227 lb 11.8 oz)   03/30/25 1654 100.8 kg (222 lb 3.6 oz)   03/30/25 1508 100.8 kg (222 lb 3.6 oz)       Wt Readings from Last 10 Encounters:   04/04/25 113.4 kg (250 lb)   03/30/25 95.3 kg (210 lb)   03/20/25 104.3 kg (230 lb)   03/06/25 104.3 kg (230 lb)   02/03/25 105.2 kg (232 lb)   01/31/25 109 kg (240 lb 3.2 oz)   12/23/24 132.8 kg (292 lb 12.3 oz)   10/24/24 115.2 kg (254 lb)   05/17/24 112.9 kg (249 lb)   02/22/24 112.9 kg (249 lb)        NUTRITION:  Diet:       Procedures    Regular/General diet Sodium Restriction: 2 GM NA; Is Patient on Accuchecks? No      Food Allergies: No  Cultural/Ethnic/Mosque Preferences Addressed: Yes    Percent Meals Eaten (last 3 days)       Date/Time Percent Meals Eaten (%)    04/01/25 1758 50 %    04/03/25 1200 50 %    04/03/25 1800 50 %            GI SYSTEM REVIEW: constipation; last BM 4/4  Skin/Wounds: WNL    NUTRITION RELATED PHYSICAL FINDINGS:     1. Body Fat/Muscle Mass: no wasting noted      2. Fluid Accumulation: lower extremity edema non-pitting    NUTRITION PRESCRIPTION:  56.8 kg IBW  Calories: 4846-2669 calories/day (25-30 kcal/kg)  Protein:  grams protein/day (1.5-2.0 gm/kg)  Fluid: ~1 ml/kcal or per MD discretion    NUTRITION DIAGNOSIS/PROBLEM:  Malnutrition related to insufficient appetite resulting in inadequate nutrition intake as evidenced by documented/reported insufficient oral intake and documented/reported unintentional weight loss    MONITOR AND EVALUATE/NUTRITION GOALS:  PO intake of 75% of meals TID - New  PO intake of 75% of oral nutrition supplement/s - New  Weight stable within 1 to 2 lbs during admission - New      MEDICATIONS:  Colace, miralax, simethicone    LABS:  Reviewed     Pt is at Moderate nutrition risk    Carola Bruno RD, LDN, [facility]  Clinical Dietitian  Spectra: [phone]

## 2025-04-04 NOTE — OCCUPATIONAL THERAPY NOTE
OCCUPATIONAL THERAPY EVALUATION - INPATIENT     Room Number: 474/474-A  Evaluation Date: 4/4/2025  Type of Evaluation: Initial  Presenting Problem: s/p endovascular aortic arch repair 4/2    Physician Order: IP Consult to Occupational Therapy  Reason for Therapy: ADL/IADL Dysfunction and Discharge Planning    OCCUPATIONAL THERAPY ASSESSMENT   Baseline function: independent ADL, uses cane  Current function: CGA  Below baseline with ADL/transfers.   Deficits: proximal LUE strength/initiation, LUE gross coordination, deconditioning, balance, dizziness, endurance  Continue OT in hospital.    Patient will benefit from continued skilled OT Services at discharge to promote prior level of function and safety with additional support and return home with home health OT         History: Patient is a 59 year old female admitted on 3/30/2025 with Presenting Problem: s/p endovascular aortic arch repair 4/2. Co-Morbidities : CKD, previous aneurysm repair, DM, HTN, HL, obesity    Patient is a 59 year old female admitted on 3/30/2025 from home for several week c/o of chest and subscapular pain.  Pt diagnosed with aortic arch aneurysm.  Pt is now s/p LEFT CAROTID TO SUBCLAVIAN TRANSPOSITION, ENDOVASCULAR ARCH REPAIR. STENTING OF THE LEFT RENAL, LEFT ILIAC - per chart on 4/2     WEIGHT BEARING RESTRICTION  Weight Bearing Restriction: None                Recommendations for nursing staff:   Transfers: CGA with RW  Toileting location: commode    EVALUATION SESSION:  ACTIVITY TOLERANCE: SBP 160s per a line, O2 >90% on RA    COGNITION  Following Commands:  follows one step commands consistently  Awareness of Deficits:  fully aware of deficits    UPPER EXTREMITY  ROM: within functional limits   Strength: within functional limits except for the following;  L shoulder and elbow 2/5. RUE 3+/5 shoulder and elbow. Distal strength/coordination symmetrical. To reassess after a line removal from LUE.      PM: L shoulder flexion and elbow flexion  remain 2/5, however increased ROM against gravity compared with AM session.    EDUCATION PROVIDED  Patient Education : Role of Occupational Therapy; Plan of Care; Discharge Recommendations; Functional Transfer Techniques  Patient's Response to Education: Verbalized Understanding    PATIENT START OF SESSION: semi supine  FUNCTIONAL TRANSFER ASSESSMENT  Sit to Stand: Edge of Bed  Edge of Bed: Contact Guard Assist    BED MOBILITY  Supine to Sit : Stand-by Assist    BALANCE ASSESSMENT     FUNCTIONAL ADL ASSESSMENT  Eating: Modified Independent (increased time for opening packages)  LB Dressing Seated: Supervision (slip on shoes EOB)      EQUIPMENT USED: RW  Demonstrates functional use, Would benefit from additional trial      THERAPIST COMMENTS: ADL/mobility as noted. Pt c/o mild dizziness EOB, does not worsen with standing, BP emily throughout. CGA to chair via RW. Engaged in eating task. Pt uses RUE to lift LUE, but then able to incorporate LUE successfully into eating tasks. /pinch strength symmetrical.       PM: Returned to reassess LUE s/p a line removal. Pt remains 2/5 proximal strength, however able to move further through ROM against gravity than earlier today and demonstrates increased eccentric control. Educated on LUE self-AAROM. 5 reps elbow flexion/extension, 2 sets 5 reps forward punch.  with seated HEP; RN aware.     Patient End of Session: Up in chair, Needs met, Call light within reach, RN aware of session/findings, All patient questions and concerns addressed, Hospital anti-slip socks, With  staff    OCCUPATIONAL PROFILE    HOME SITUATION  Type of Home: Condo  Home Layout: One level  Lives With: Alone    Toilet and Equipment: Standard height toilet  Shower/Tub and Equipment: Tub-shower combo, Shower chair       Occupation/Status: ST disability from [employer]  Hand Dominance: Right  Drives: Yes       Prior Level of Function: Pt reports independence with ADL and ambulation with cane prior to  admit.      SUBJECTIVE   Pt states she thinks she will ask a friend to stay with her at discharge.    PAIN ASSESSMENT  Rating: 3  Location: low back  Management Techniques: Nurse notified    OBJECTIVE  Precautions: Drain(s)  Fall Risk: High fall risk      ASSESSMENTS    AM-PAC ‘6-Clicks’ Inpatient Daily Activity Short Form  -   Putting on and taking off regular lower body clothing?: A Little  -   Bathing (including washing, rinsing, drying)?: A Little  -   Toileting, which includes using toilet, bedpan or urinal? : A Little  -   Putting on and taking off regular upper body clothing?: A Little  -   Taking care of personal grooming such as brushing teeth?: A Little  -   Eating meals?: None    AM-PAC Score:  Score: 19  Approx Degree of Impairment: 42.8%  Standardized Score (AM-PAC Scale): 40.22    ADDITIONAL TESTS     NEUROLOGICAL FINDINGS      COGNITION ASSESSMENTS       PLAN  OT Treatment Plan: Balance activities, ADL training, Functional transfer training, Patient/Family education, Patient/Family training, Compensatory technique education, Energy conservation/work simplification techniques, UE strengthening/ROM, Endurance training, Equipment eval/education, Fine motor coordination activities, Neuromuscluar reeducation  Rehab Potential : Good  Frequency: 3-5x/week  Number of Visits to Meet Established Goals: 5    ADL Goals   Patient will perform grooming: with modified independent  Patient will perform lower body dressing:  with modified independent  Patient will perform toileting: with modified independent    Functional Transfer Goals  Patient will transfer to toilet:  with modified independent    UE Exercise Program Goal  Patient will be supervision with left AROM/AAROM HEP (home exercise program).      Patient Evaluation Complexity Level:   Occupational Profile/Medical History LOW - Brief history including review of medical or therapy records    Specific performance deficits impacting engagement in ADL/IADL LOW  1  - 3 performance deficits    Client Assessment/Performance Deficits LOW - No comorbidities nor modifications of tasks    Clinical Decision Making LOW - Analysis of occupational profile, problem-focused assessments, limited treatment options    Overall Complexity LOW     OT Session Time: 40 minutes  Self-Care Home Management: 15 minutes  Therapeutic Activity: 15 minutes  Neuromuscular Re-education:  minutes  Therapeutic Exercise:  minutes  Orthotic Management and Training:  minutes

## 2025-04-04 NOTE — PROGRESS NOTES
[facility] Critical Care Progress Note    Alisha Wilde Patient Status:  Inpatient    10/25/1965 MRN [id number]   Location [facility] 4SW-A Attending Jade Cameron MD   Hosp Day # 5 PCP Bridger Avendano MD     Subjective:  Alisha Wilde is a(n) 59 year old female who is admitted on 3/30/2025 for Dissection of aorta [I71.0]      Overnight: No acute events overnight some back pain    OBJECTIVE  Vitals:  /84 (BP Location: Right arm)   Pulse 97   Temp 98.3 °F (36.8 °C) (Temporal)   Resp 19   Wt 250 lb (113.4 kg)   SpO2 97%   BMI 41.60 kg/m²           Physical Exam:    General Appearance: alert, well appearing, and in no distress  Lungs: clear to auscultation bilaterally  Heart: regular rate and rhythm, S1, S2 normal, no murmur, click, rub or gallop  Abdomen: soft, non-tender, without masses or organomegaly  Extremities: Atraumatic, no cyanosis or edema, capillary refill <3 sec.    Pulses: 2+ and symmetric all extremities  Skin: Skin color, texture, turgor normal for ethnicity, no rashes or lesions, warm and dry  Neurologic: Grossly normal some LUE weakness, moving hand but weaker compared to right    Lab Data Review:  Recent Labs     25  0411 25  0644   WBC 5.1 5.6 17.9* 13.6* 17.0*   HGB 9.8* 9.1* 12.6 12.1 11.4*   .0 227.0 236.0 198.0 188.0     Recent Labs     25  0411 25  0644   * 135* 137 139 137   K 4.7 4.2 4.2 3.8 3.9   * 113* 108 109 105   CO2 14.0* 15.0* 18.0* 20.0* 23.0   BUN 14 16 14 14 13   CREATSERUM 1.70* 1.88* 1.67* 1.68* 1.41*     Recent Labs   Lab 25  0757 25  1631 25  0320 25  1739 25  0411   PTP 14.2   < > 14.8 14.8 15.1*   INR 1.04   < > 1.15 1.14 1.18   PTT 29.6  --   --  31.1 27.6    < > = values in this interval not displayed.       Cultures:   No results found for this visit on 25.    Radiology:  XR CHEST AP PORTABLE   (JJA=[id number])  Narrative: PROCEDURE:  XR CHEST AP PORTABLE  (CPT=71045)     TECHNIQUE:  AP chest radiograph was obtained.     COMPARISON:  [facility] , XR, XR CHEST AP PORTABLE  (CPT=71045), 4/02/2025, 5:36 AM.     INDICATIONS:  Follow-up     PATIENT STATED HISTORY: (As transcribed by Technologist)  Patient offered no additional history at this time.          FINDINGS:  Lung volumes are small.  Dense patchy consolidation retrocardiac and left base with silhouette of the diaphragm contour.  Stable cardiomediastinal contour, with mild cardiomegaly.  The pulmonary vessels appear smaller and better defined.    Sternotomy changes are seen.       New changes consistent with placement of a thoracic endo graft.  Additional stents appear to be at the central great vessels.  There is a right IJ vascular sheath/catheter.  Tip is at the upper SVC, brachiocephalic vein confluence level.  New surgical   clips are seen at the left neck.                         Impression: CONCLUSION:    1. New thoracic endograft placement.  2. Suspect atelectasis in the lower left lung.  3. Continued clinical correlation and follow-up recommended.          LOCATION:  [facility]                 Dictated by (CST): Zeke Simmons MD on 4/04/2025 at 6:30 AM       Finalized by (CST): Zeke Simmons MD on 4/04/2025 at 6:32 AM         Medications reviewed     Assessment and Plan:   Patient Active Problem List   Diagnosis    Hypercholesteremia    Benign essential HTN    Vitamin D deficiency    Adjustment disorder with mixed anxiety and depressed mood    Controlled type 2 diabetes mellitus without complication, without long-term current use of insulin (HCC)    Obesity (BMI 30-39.9)    Polyp of colon    Flat feet, bilateral    Myalgia due to statin    Dissection of ascending aorta (HCC)    Stage 3 chronic kidney disease (HCC)    Iron deficiency    Constipation    Leg swelling    Gastroesophageal reflux disease    Dissection of aorta, unspecified portion of aorta (HCC)     Aneurysm of aortic arch     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 s/p left carotid transposition, endovascular arch repair, stenting of the left renal, left iliac artery   - BP goals per vascular surgery   - started on levo last night     History of tube A dissection and prolonged respiratory failure s/p trach and peg    Hypertension   - BP goals per vascular, currently mildly hypotensive    HLD   - zetia    CKD stage 3    TANYA   - on home CPAP        The 21st Century Cures Act makes medical notes like these available to patients in the interest of transparency. Please be advised this is a medical document. Medical documents are intended to carry relevant information, facts as evident, and the clinical opinion of the practitioner. The medical note is intended as peer to peer communication and may appear blunt or direct. It is written in medical language and may contain abbreviations or verbiage that are unfamiliar.      Sb Campbell MD

## 2025-04-04 NOTE — PROGRESS NOTES
[facility]   [facility] Progress Note     Alisha Wilde Patient Status:  Inpatient    10/25/1965 MRN [id number]   Location [facility] 4SW-A Attending Jade Cameron MD   Hosp Day # 4 PCP Bridger Avendano MD     Chief Complaint: back pain, chest pain     Subjective:     Patient reports low back pain.     Objective:      Vital signs:  Temp:  [97 °F (36.1 °C)-97.7 °F (36.5 °C)] 97.6 °F (36.4 °C)  Pulse:  [85-99] 97  Resp:  [12-29] 20  BP: (134)/(65) 134/65  SpO2:  [89 %-100 %] 96 %  AO: (126-155)/(59-70) 155/70    Physical Exam:    General: NAD  HEENT: Oral mucosa moist  Heart: Normal S1 and S2. Regular rhythm  Lungs: CTAB  Abdomen: Soft, non-tender  Extremities: no edema  Neuro: NO facial asymmetry. Tongue protrudes midline    Diagnostic Data:    Labs:  Recent Labs   Lab 25  0757 25  1631 25  1632 25  0320 255 25  0411   WBC 7.9  --    < > 5.1 5.0 5.1 5.6 17.9* 13.6*   HGB 9.8*  --    < > 9.4* 9.3* 9.8* 9.1* 12.6 12.1   MCV 79.5*  --    < > 79.1* 78.9* 79.4* 79.6* 79.3* 77.2*   .0  --    < > 249.0 228.0 228.0 227.0 236.0 198.0   INR 1.04 1.14  --  1.15  --  1.14  --   --  1.18    < > = values in this interval not displayed.       Recent Labs   Lab 25  04425  0235 25  0411   * 102* 149* 175* 160*   BUN 13 14 16 14 14   CREATSERUM 1.54* 1.70* 1.88* 1.67* 1.68*   CA 9.0 8.8 8.8 9.4 9.0   ALB 3.8 3.9 3.5  --   --     135* 135* 137 139   K 4.3 4.7 4.2 4.2 3.8   * 114* 113* 108 109   CO2 18.0* 14.0* 15.0* 18.0* 20.0*   ALKPHO 55 58 56  --   --    AST 9 12 9  --   --    ALT <7* <7* 9*  --   --    BILT 0.2 0.2 0.2  --   --    TP 6.5 6.7 6.3  --   --        Estimated Glomerular Filtration Rate: 34 mL/min/1.73m2 (A) (result from lab).    Recent Labs   Lab 25  0757   TROPHS 8       Recent Labs   Lab 25  0320  04/01/25  1739 04/03/25  0411   PTP 14.8 14.8 15.1*   INR 1.15 1.14 1.18                  Microbiology    No results found for this visit on 03/30/25.      Imaging: Reviewed in Epic.    Medications:    docusate sodium  100 mg Oral BID    polyethylene glycol (PEG 3350)  17 g Oral Daily    simethicone  160 mg Oral TID CC and HS    heparin  5,000 Units Subcutaneous 2 times per day       Assessment & Plan:      #Aortic arch dissection with aneurysm   #L renal artery dissection  Vascular surgery following  Admitted to [facility]  S/p left carotid to subclavian transposition, endovascular arch repair, stenting of the left renal, left iliac  BP goals per vascular surgery   Bowel regimen  Pain management     #Prior hx of Type A aortic dissection  S/p repair 11/2024  Post op complications with rep failure and NAIMA needing trach and peg that are now removed     #Ess HTN    #Dyslipidemia  zetia     #CKD3     #TANYA     #Obesity, BMI 36    Jade Cameron MD    Supplementary Documentation:     Quality:  DVT Mechanical Prophylaxis:   SCDs, Early ambuation  DVT Pharmacologic Prophylaxis   Medication    heparin (Porcine) 5000 UNIT/ML injection 5,000 Units                Code Status: Full Code  Webb: Webb catheter in place  Webb Duration (in days): 2  Central line:    BRANDON:     Discharge is dependent on: course  At this point Ms. Wilde is expected to be discharge to: unclear    The 21st Century Cures Act makes medical notes like these available to patients in the interest of transparency. Please be advised this is a medical document. Medical documents are intended to carry relevant information, facts as evident, and the clinical opinion of the practitioner. The medical note is intended as peer to peer communication and may appear blunt or direct. It is written in medical language and may contain abbreviations or verbiage that are unfamiliar.

## 2025-04-04 NOTE — PROGRESS NOTES
[facility]   part of [facility]     Vascular Surgery Progress Note    Alisha Wilde Patient Status:  Inpatient    10/25/1965 MRN [id number]   Location [facility] 4SW-A Attending Jade Cameron MD   Hosp Day # 5 PCP Bridger Avendano MD     Objective:   Temp: 99.4 °F (37.4 °C)  Pulse: 104  Resp: 18  BP: 130/64  AO: 125/86      Events Yesterday/Overnight:    Patient is a 59 year old female, POD 2 left carotid to subclavian transposition, endovascular arch repair, stenting of the left renal and iliac arteries with Dr. Holder.  Blood pressure 130/64.  Patient continues to experience gas pain.  She states she has full sensation in the bilateral upper and lower extremities.  She is able to move her upper and lower extremities.  However, patient has decreased strength in the left upper and lower extremity compared to the right.  Patient states this is slowly improving.  Hemoglobin 11.4.  WBC 17.      Exam:    Left SERVANDO drain to suction, serosanguineous drainage present. Bilateral groins soft, no hematoma, no drainage present.  Palpable bilateral DP and PT pulses.  Neurologically intact.  Patient able to move the bilateral lower extremities.  Greater strength in the right upper and lower extremity compared to left.    Impression/Plan:    A-line and Webb to remain in place.,  Will plan for discontinuation tomorrow.    Physical therapy.    Pain control.       Medications:  Current Facility-Administered Medications   Medication Dose Route Frequency    bisacodyl (Dulcolax) 10 MG rectal suppository 10 mg  10 mg Rectal Daily PRN    [START ON 2025] multivitamin (Tab-A-Treasure/Beta Carotene) tab 1 tablet  1 tablet Oral Daily    docusate sodium (Colace) cap 100 mg  100 mg Oral BID    polyethylene glycol (PEG 3350) (Miralax) 17 g oral packet 17 g  17 g Oral Daily    simethicone (Mylicon) chewable tab 160 mg  160 mg Oral TID CC and HS    glucose (Dex4) 15 GM/59ML oral liquid 15 g  15 g Oral Q15 Min PRN    Or    glucose  (Glutose) 40% oral gel 15 g  15 g Oral Q15 Min PRN    Or    glucose-vitamin C (Dex-4) chewable tab 4 tablet  4 tablet Oral Q15 Min PRN    Or    dextrose 50% injection 50 mL  50 mL Intravenous Q15 Min PRN    Or    glucose (Dex4) 15 GM/59ML oral liquid 30 g  30 g Oral Q15 Min PRN    Or    glucose (Glutose) 40% oral gel 30 g  30 g Oral Q15 Min PRN    Or    glucose-vitamin C (Dex-4) chewable tab 8 tablet  8 tablet Oral Q15 Min PRN    lactated ringers infusion   Intravenous Continuous    acetaminophen (Tylenol) tab 650 mg  650 mg Oral Q4H PRN    Or    HYDROcodone-acetaminophen (Norco) 5-325 MG per tab 1 tablet  1 tablet Oral Q4H PRN    Or    HYDROcodone-acetaminophen (Norco) 5-325 MG per tab 2 tablet  2 tablet Oral Q4H PRN    ondansetron (Zofran) 4 MG/2ML injection 4 mg  4 mg Intravenous Q6H PRN    prochlorperazine (Compazine) 10 MG/2ML injection 5 mg  5 mg Intravenous Q8H PRN    lactated ringers infusion   Intravenous Continuous    heparin (Porcine) 5000 UNIT/ML injection 5,000 Units  5,000 Units Subcutaneous 2 times per day    HYDROmorphone (Dilaudid) 1 MG/ML injection 0.2 mg  0.2 mg Intravenous Q2H PRN    Or    HYDROmorphone (Dilaudid) 1 MG/ML injection 0.4 mg  0.4 mg Intravenous Q2H PRN    Or    HYDROmorphone (Dilaudid) 1 MG/ML injection 0.8 mg  0.8 mg Intravenous Q2H PRN    niCARdipine in sodium chloride 0.86% (carDENE) 20 mg/200mL infusion premix  5-15 mg/hr Intravenous Continuous PRN    norepinephrine (Levophed) 4 mg/250mL infusion premix  0.5-30 mcg/min Intravenous Continuous PRN       Laboratory/Data:    LABS:  Recent Labs   Lab 03/30/25  0757 03/30/25  1631 03/30/25  1632 03/31/25  0320 04/01/25  0449 04/01/25  1739 04/02/25  0235 04/02/25  2226 04/03/25  0411 04/04/25  0644   WBC 7.9  --    < > 5.1   < > 5.1 5.6 17.9* 13.6* 17.0*   HGB 9.8*  --    < > 9.4*   < > 9.8* 9.1* 12.6 12.1 11.4*   MCV 79.5*  --    < > 79.1*   < > 79.4* 79.6* 79.3* 77.2* 77.8*   .0  --    < > 249.0   < > 228.0 227.0 236.0 198.0  188.0   INR 1.04 1.14  --  1.15  --  1.14  --   --  1.18  --     < > = values in this interval not displayed.       Recent Labs   Lab 03/30/25  1631 03/31/25 0320 04/01/25 0449 04/01/25 1739 04/02/25 0235 04/02/25  2226 04/03/25  0411 04/04/25  0644    138 141 135* 135* 137 139 137   K 3.2* 4.6  4.6 4.3 4.7 4.2 4.2 3.8 3.9   * 108 113* 114* 113* 108 109 105   CO2 18.0* 20.0* 18.0* 14.0* 15.0* 18.0* 20.0* 23.0   BUN 12 14 13 14 16 14 14 13   CREATSERUM 0.92 1.39* 1.54* 1.70* 1.88* 1.67* 1.68* 1.41*   GLU 84 99 107* 102* 149* 175* 160* 112*   CA 7.4* 9.1 9.0 8.8 8.8 9.4 9.0 8.9   MG  --   --  2.3  --  2.2  --   --   --    PHOS 2.5 4.5 3.7  --  4.2  --   --   --      Recent Labs   Lab 03/30/25  0757 03/30/25 1631 03/31/25 0320 04/01/25 1739 04/03/25  0411   PTP 14.2   < > 14.8 14.8 15.1*   INR 1.04   < > 1.15 1.14 1.18   PTT 29.6  --   --  31.1 27.6    < > = values in this interval not displayed.     Recent Labs   Lab 03/31/25 0320 04/01/25 0449 04/01/25 1739 04/02/25  0235 04/04/25  0644   ALT <7* <7* <7* 9* <7*   AST <8 9 12 9 11   ALB 3.9 3.8 3.9 3.5 3.7     No results for input(s): \"TROP\" in the last 168 hours.  Lab Results   Component Value Date    ANASCRN Negative 06/05/2021     No results for input(s): \"PCACT\", \"PSACT\", \"AT3ACT\", \"HIPAB\", \"PATHI\", \"STALA\", \"DRVVTRATIO\", \"DRVVT\", \"STACLOT\", \"CARIGG\", \"L3EV9NAKSU\", \"M2CW7TMXQI\", \"RA\", \"HAVIGM\", \"HBCIGM\", \"HCVAB\", \"HBSAG\", \"HBCAB\", \"HBVDNAINTERP\", \"ANAS\", \"C3\", \"C4\" in the last 168 hours.    Helen Moses, STACEY  4/4/2025  12:39 PM

## 2025-04-04 NOTE — CM/SW NOTE
04/04/25 1600   Discharge Needs   Anticipated D/C needs Home health care   Choice of Post-Acute Provider   Informed patient of right to choose their preferred provider Yes   List of appropriate post-acute services provided to patient/family with quality data Yes     Met with patient at the bedside to provide list of available HH providers.  Pt will review and notify SW/CM of choice.  Pt requested info on possible caregiver assistance.  Provided her with contact info for [employer] liaison, but she does not think she can pay for caregivers.    She mentioned that there is a woman from [facility] who comes to her apartment building to assist seniors with Medicaid applications.      [facility] to see if they have any HH aides available for assistance while she is receiving HH services and none of them do.    / to remain available for support and/or discharge planning.     Koki GONZALEZA MSN, RN Case Manager  [id number]

## 2025-04-04 NOTE — OPERATIVE REPORT
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  Thoracoabdominal aortic aneurysm with Dissection  3.   Severe left external iliac artery stenosis  4.  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  Thoracoabdominal aortic aneurysm with Dissection  3.   Severe left external iliac artery stenosis  4.  Severe left renal artery stenosis    Procedure Performed:   Left carotid transposition to the left subclavian artery  Ultrasound and access of the bilateral common femoral arteries  Ultrasound-guided access of the right radial artery  Thoracic and abdominal aortogram with pelvic angiogram with radiologic supervision and interpretation  Selective catheterization third order branch of the aorta (left brachial artery  Left upper extremity arteriogram with radiologic supervision and interpretation  Thoracic endovascular aortic repair with coverage of the subclavian artery with placement of modified Leland TBE 40x150 with 40x150 Leland CTAG, [id number] to celiac.  Angioplasty and stenting of the left subclavian artery with placement of 11x39 VBX  Angioplasty and stenting of the innominate artery with placement of [id number] Leland side branch endoprosthesis.  Angioplasty and stenting of infrarenal aortic dissection with 36x180 Zenith dissection cook baremetal stent.  Angioplasty and stenting of left renal artery with 7x22 iCast flaired with 9mm balloon.  Angioplasty and stenting of the left common iliac artery with 14 mm protégé stent  Placed and stenting of the right common iliac artery with 14 mm protégé stent  14. Angioplasty and stenting of left external iliac artery 10 mm protégé stents postdilated with 10 mm balloon.  15. Intravascular ultrasound of  left external iliac, left common iliac, right external iliac, right common iliac and aorta  with radiologic supervision and interpretation.  16. Closure of bilateral groin with Pro-glide Perclose suture (26 Welsh right, 8 Welsh left).     Co-Surgeons:  MD Frank Roth MD     Due to the complexity of the operation I was asked by my partner to participate as cosurgeon for this operation given the severe medical and surgical complexity.     Surgical Findings: Carotid transposed to the left subclavian.  Endovascular repair performed in the arch with successful exclusion of the aneurysm.   Wide patency of the great vessels with no endoleak.  Neuromonitoring intact with no deficits throughout the entirety of the procedure.  Thermal septotomy performed distally followed by a fenestrated endovascular aortic repair.  Subsequent embolization of SMA bypass performed and repair of left internal iliac dissociated components.  Palpable pulses in the bilateral lower extremities at completion.  Extubated and neurologically intact at completion.     Specimen: None     Estimated Blood Loss: Blood Output: 800 mL       Drains: 10 mm neck drain      Transfusions: 4 unit packed red blood cells     Complications: none.       Disposition: Awoke from general anesthesia was extubated and taken to the ICU neurovascularly intact.     Indications for procedure: This is a 59 year-old female who was found to have a thoracic aortic aneurysm extending and beginning at the arch with a thoracoabdominal aortic dissection.  She had previous She was also noted to have progressive aneurysmal degeneration in the thoracic aorta to 6.5 cm with rapid growth from her previous operation in November.  Her aneurysm met size criteria for repair.  She had no suitable landing zone in the zone 1 or zone 2 segment.  She had previous long hospitalization requiring trach and PEG.  She is also morbidly obese. She was evaluated  and given the extent of the operation and the redo nature, was deemed to be high risk for open surgery.    We  thus recommended a fenestrated repair of the arch with carotid transposition,. He was informed that there was no stent commercially available and that he would require a physician modified endograft.  Dr. Holder discussed the risk and benefits of the procedure.  Informed consent was directly obtained.     On the morning of 04/02 the patient was brought into the operating room and placed in supine position.  General anesthesia was induced without any issues.  An arterial line, Webb catheter and central line were placed.  Antibiotics were administered.  The patient was subsequently prepped and draped in the usual sterile fashion.  Timeout was performed.     During the anesthesia portion, we modified a [place] TBE and added a fenestration distal to the side branch.  A microwire was used to deion the fenestration and this was reinforced with 4-0 Ethibond suture the device was then ready for use.       With the use of a 10 blade an 8 cm incision was made in the supraclavicular portion of the base of the neck.  Dissection was carried down to electrocautery through the subcutaneous tissues.  Retractors were placed.  The platysma was transected.  The clavicular head of the sternocleidomastoid was transected with electrocautery.  We first identified the jugular vein and mobilized this laterally to identify the common carotid.  This was circumferentially dissected by Dr. Holder down to the base under the sternum in order to ensure enough length for transposition.  The vein was then mobilized medially.  The fat pad was mobilized laterally and the anterior skin was identified with identification and preservation of the phrenic nerve.  This was transected with electrocautery.  The subclavian artery was identified and circumferentially dissected and encircled with Vesseloops with preservation of all branches.  The patient was systemically heparinized.  The common carotid was clamped proximally distally and transected with Kemp  scissors and tunneled in a retrojugular fashion.  The stump was ligated with the use of 4-0 Prolene suture in running 2 layered fashion.     An 11 blade was then used to perform an arteriotomy after clamps were placed proximally and distally on the subclavian.  Amaral scissors were used to carry the arteriotomy.  The artery was trimmed to appropriate length and spatulated and then with 5-0 Prolene suture and end-to-side anastomosis was performed in running fashion. Dr. Holder performed the superior portion and I performed the inferior portion.  Prior to cinching the sutures the artery was forward and backbled and flushed with heparinized saline and then the sutures were then cinched and tied thereby completing the reconstruction.  The artery was forward bled through the arm and then once this was completed flow was returned to the carotid.  Neuromonitoring was found to be stable throughout the entirety of this segment.  At this point we opted to proceed with the endovascular portion of the procedure.         During this time I accessed the right common femoral arteries with a micropuncture and advanced a microwire and exchanged for a micro sheath.  A J-wire was then put in place followed by placement of a 6 Hebrew sheath.  I then deployed 2 Pro-glide Perclose sutures in the 10 and 2 o'clock position in each groin with subsequent placement of 8 Hebrew sheath.  I performed this in the right. Dr. Holder performed this on the left.        The wire was then advanced into the aorta bilaterally.  Intravascular ultrasound was advanced from the right external iliac, right common iliac into the aorta to confirm placement into the true lumen.  I performed this on the right. Dr. Holder performed this on the left with confirmation through the left external iliac, left common iliac and aorta. There was severe true lumen compression.  We used the intravascular sound to verify perfusion imaging.       I then exchanged for a 26  Bangladeshi dry seal sheath in the right groin.   Dr. Holder then accessed the right radial artery with advancement of a microwire and exchanged for a 6 Bangladeshi slender sheath.  A Glidewire was then used to traverse the upper extremity and was advanced across the arch into the dry seal graft by obtaining through and through access.  He then exchanged for a Sensorflare PC Acrobat wire.    The graft was advanced into the descending thoracic aorta across the arch with care to manipulate the wire to follow the outer curve thereby aligning the branches with the ostia.   I then utilized the preloaded catheter through the fenestration and used this to engage it and select the left subclavian artery and the wire was advanced into the brachial artery and the catheter was advanced and confirmed with contrast injection via left upper extremity arteriogram to be in appropriate location.  I then placed an Amplatz wire.    I then deployed the graft in the ascending aorta, aortic arch as well as the descending thoracic aorta without any issue.  He was then able to advance a 5 Bangladeshi shuttle sheath from the right arm through the branch.      I then advanced the Saxon 20 mm sidebranch over the through wire through the portal into the innominate artery.  Dr. Holder then performed a right upper extremity arteriogram to identify the subclavian and carotid bifurcation off of the innominate. He then deployed the innominate artery stent performing angioplasty and stenting without any issues.  The angioplasty was performed with a molding and occluding aortic balloon.  I then exchanged for an 8 Bangladeshi sheath through the right groin access through the fenestration over the Amplatz wire.  I then performed left upper extremity arteriogram that revealed the location of the vertebral artery and carotid. I then deployed an 11x39 VBX without issue in the left subclavian artery.  Then flared the proximal segment with an overinflated 12 mm balloon.  Repeat angiogram  revealed wide patency of the stent with preservation of flow to the subclavian and carotid on the right and on the left with no leak through this.       I then exchanged for a 40 mm C tag and extended this distal to the fenestration into the descending thoracic aorta. Dr. Holder then deployed a [id number] mid length graft into the the descending thoracic aorta to the level of the celiac artery.  He deployed this without any issue.   He then deployed the bare-metal dissection graft with appropriate overlap all the way to the distal infrarenal aorta.  He then advanced the balloon and I inflated this to rupture the septum extending from the descending thoracic down to the through the infrarenal aorta.     Dr. Holder then selected the left renal artery with the  and glide wire and confirmed cannulation with quickcross catheter with contrast injection.  An Pérez wire was put in place.  Then deployed a 7 x 22 iCast stent without any issues and flared the proximal segment with 9 mm balloon.  Repeat angiogram revealed wide patency with no further stenosis and excellent flow to the kidney.    We then exchanged for 14 mm protégé stents and performed angioplasty and stenting of the bilateral common iliac arteries in kissing fashion.  I performed this on the left and he performed this on the right.  I then deployed an additional 10 mm protégé stent in the left external iliac artery without any issues and postdilated the proximal and distal segments with 10 mm balloon.  I performed this on the left and he performed this on the right.     A thoracic aortogram and abdominal aortogram and pelvic angiogram were performed with the revealed wide patency of the stents with preservation of flow to the great vessels and sealing of the aneurysm with no endoleak with preservation of flow to all visceral vessels.  Intravascular ultrasound from each end confirmed dramatic improvement of flow and excellent graft expansion.  At this  point we are satisfied and opted to terminate the procedure.  All catheters were withdrawn.  I then cinched the Pro-glide Perclose sutures in the bilateral groins which were cinched locked and cut thereby closing the arteriotomy site.A TR band was placed in the right radial access after removal of the sheath.     Topical hemostatic agents were placed.  Protamine was administered.  Upon completion there was evidence of excellent hemostasis and at this point we proceeded with closure.  Dr. Holder closed the neck incision with the use of 3-0 Vicryl suture in interrupted multilayer fashion.  The skin was closed with 4 Monocryl sutures in subcuticular fashion with placement of Dermabond and sterile dressings.  Of note he did place a 10 mm Chang-Dupree drain into the neck to monitor for chyle leak.     The patient awoke from general anesthesia was extubated neurologically intact and taken to the ICU in stable condition.  All counts were correct at the end of the case. She was confirmed to have palpable pulses in the upper and lower extremities as well and was found to be neurovascularly intact.     Magdy Palm MD  [facility]  Vascular Surgery

## 2025-04-04 NOTE — PLAN OF CARE
Received patient awake and alert x 4.   O2 protocol: On room air, clear lungs, sats 95%. Denies shortness of breath. CPAP at night w/ 2LNC.   Normal sinus rhythm on telemetry monitoring. Denies chest pain.   Cardiac electrolyte protocol, K 3.8  Pain management, Norco po given with good relief.   Had bowel movement today, scant amount in bedside commode.   Aguilar draining adequately, clear yellow urine, aguilar care and pericare done.   Fall precaution, bed alarm on, call light and bedside table within reach.    Up with standby assist and a walker.   0100: Levo started to keep -160.   SERVANDO to suction bulb.   0700: Levo dcd per Dr Hollis. -140's    Problem: Diabetes/Glucose Control  Goal: Glucose maintained within prescribed range  Description: INTERVENTIONS:- Monitor Blood Glucose as ordered- Assess for signs and symptoms of hyperglycemia and hypoglycemia- Administer ordered medications to maintain glucose within target range- Assess barriers to adequate nutritional intake and initiate nutrition consult as needed- Instruct patient on self management of diabetes  Outcome: Progressing     Problem: Patient/Family Goals  Goal: Patient/Family Long Term Goal  Description: Patient's Long Term Goal: To go home. Interventions:- PT/OT. Trend labs/vs. - See additional Care Plan goals for specific interventions  Outcome: Progressing  Goal: Patient/Family Short Term Goal  Description: Patient's Short Term Goal: Pain management. Interventions: - Take pain meds prn. Reposition. - See additional Care Plan goals for specific interventions  Outcome: Progressing     Problem: CARDIOVASCULAR - ADULT  Goal: Absence of cardiac arrhythmias or at baseline  Description: INTERVENTIONS:- Continuous cardiac monitoring, monitor vital signs, obtain 12 lead EKG if indicated- Evaluate effectiveness of antiarrhythmic and heart rate control medications as ordered- Initiate emergency measures for life threatening arrhythmias- Monitor  electrolytes and administer replacement therapy as ordered  Outcome: Progressing

## 2025-04-05 ENCOUNTER — APPOINTMENT (OUTPATIENT)
Dept: GENERAL RADIOLOGY | Facility: HOSPITAL | Age: 60
End: 2025-04-05
Attending: INTERNAL MEDICINE
Payer: COMMERCIAL

## 2025-04-05 ENCOUNTER — APPOINTMENT (OUTPATIENT)
Dept: CT IMAGING | Facility: HOSPITAL | Age: 60
End: 2025-04-05
Attending: SURGERY
Payer: COMMERCIAL

## 2025-04-05 LAB
ANION GAP SERPL CALC-SCNC: 9 MMOL/L (ref 0–18)
APTT PPP: 30.6 SECONDS (ref 23–36)
APTT PPP: 65.8 SECONDS (ref 23–36)
BASOPHILS # BLD AUTO: 0.06 X10(3) UL (ref 0–0.2)
BASOPHILS NFR BLD AUTO: 0.3 %
BUN BLD-MCNC: 11 MG/DL (ref 9–23)
CALCIUM BLD-MCNC: 9.1 MG/DL (ref 8.7–10.6)
CHLORIDE SERPL-SCNC: 100 MMOL/L (ref 98–112)
CHOLEST SERPL-MCNC: 121 MG/DL (ref ?–200)
CO2 SERPL-SCNC: 26 MMOL/L (ref 21–32)
CREAT BLD-MCNC: 1.13 MG/DL
EGFRCR SERPLBLD CKD-EPI 2021: 55 ML/MIN/1.73M2 (ref 60–?)
EOSINOPHIL # BLD AUTO: 0.04 X10(3) UL (ref 0–0.7)
EOSINOPHIL NFR BLD AUTO: 0.2 %
ERYTHROCYTE [DISTWIDTH] IN BLOOD BY AUTOMATED COUNT: 16.3 %
EST. AVERAGE GLUCOSE BLD GHB EST-MCNC: 123 MG/DL (ref 68–126)
GLUCOSE BLD-MCNC: 101 MG/DL (ref 70–99)
HBA1C MFR BLD: 5.9 % (ref ?–5.7)
HCT VFR BLD AUTO: 30.5 %
HDLC SERPL-MCNC: 32 MG/DL (ref 40–59)
HGB BLD-MCNC: 10 G/DL
IMM GRANULOCYTES # BLD AUTO: 0.11 X10(3) UL (ref 0–1)
IMM GRANULOCYTES NFR BLD: 0.6 %
LDLC SERPL CALC-MCNC: 69 MG/DL (ref ?–100)
LYMPHOCYTES # BLD AUTO: 1.46 X10(3) UL (ref 1–4)
LYMPHOCYTES NFR BLD AUTO: 8.2 %
MCH RBC QN AUTO: 25.4 PG (ref 26–34)
MCHC RBC AUTO-ENTMCNC: 32.8 G/DL (ref 31–37)
MCV RBC AUTO: 77.4 FL
MONOCYTES # BLD AUTO: 1.98 X10(3) UL (ref 0.1–1)
MONOCYTES NFR BLD AUTO: 11.1 %
NEUTROPHILS # BLD AUTO: 14.24 X10 (3) UL (ref 1.5–7.7)
NEUTROPHILS # BLD AUTO: 14.24 X10(3) UL (ref 1.5–7.7)
NEUTROPHILS NFR BLD AUTO: 79.6 %
NONHDLC SERPL-MCNC: 89 MG/DL (ref ?–130)
OSMOLALITY SERPL CALC.SUM OF ELEC: 280 MOSM/KG (ref 275–295)
PLATELET # BLD AUTO: 146 10(3)UL (ref 150–450)
POTASSIUM SERPL-SCNC: 3.4 MMOL/L (ref 3.5–5.1)
RBC # BLD AUTO: 3.94 X10(6)UL
SODIUM SERPL-SCNC: 135 MMOL/L (ref 136–145)
TRIGL SERPL-MCNC: 106 MG/DL (ref 30–149)
VLDLC SERPL CALC-MCNC: 16 MG/DL (ref 0–30)
WBC # BLD AUTO: 17.9 X10(3) UL (ref 4–11)

## 2025-04-05 PROCEDURE — 71045 X-RAY EXAM CHEST 1 VIEW: CPT | Performed by: INTERNAL MEDICINE

## 2025-04-05 PROCEDURE — 71275 CT ANGIOGRAPHY CHEST: CPT | Performed by: SURGERY

## 2025-04-05 PROCEDURE — 74174 CTA ABD&PLVS W/CONTRAST: CPT | Performed by: SURGERY

## 2025-04-05 PROCEDURE — 99232 SBSQ HOSP IP/OBS MODERATE 35: CPT | Performed by: INTERNAL MEDICINE

## 2025-04-05 PROCEDURE — 5A0935A ASSISTANCE WITH RESPIRATORY VENTILATION, LESS THAN 24 CONSECUTIVE HOURS, HIGH NASAL FLOW/VELOCITY: ICD-10-PCS | Performed by: STUDENT IN AN ORGANIZED HEALTH CARE EDUCATION/TRAINING PROGRAM

## 2025-04-05 PROCEDURE — 99233 SBSQ HOSP IP/OBS HIGH 50: CPT | Performed by: INTERNAL MEDICINE

## 2025-04-05 RX ORDER — IPRATROPIUM BROMIDE AND ALBUTEROL SULFATE 2.5; .5 MG/3ML; MG/3ML
3 SOLUTION RESPIRATORY (INHALATION)
Status: DISCONTINUED | OUTPATIENT
Start: 2025-04-05 | End: 2025-04-15

## 2025-04-05 RX ORDER — HEPARIN SODIUM AND DEXTROSE 10000; 5 [USP'U]/100ML; G/100ML
1000 INJECTION INTRAVENOUS ONCE
Status: COMPLETED | OUTPATIENT
Start: 2025-04-05 | End: 2025-04-05

## 2025-04-05 RX ORDER — HEPARIN SODIUM AND DEXTROSE 10000; 5 [USP'U]/100ML; G/100ML
INJECTION INTRAVENOUS CONTINUOUS
Status: DISCONTINUED | OUTPATIENT
Start: 2025-04-05 | End: 2025-04-07

## 2025-04-05 RX ORDER — HEPARIN SODIUM 1000 [USP'U]/ML
5000 INJECTION, SOLUTION INTRAVENOUS; SUBCUTANEOUS ONCE
Status: COMPLETED | OUTPATIENT
Start: 2025-04-05 | End: 2025-04-05

## 2025-04-05 NOTE — PROGRESS NOTES
Vascular Surgery Progress Note    /72   Pulse 106   Temp 99 °F (37.2 °C) (Temporal)   Resp 20   Wt 246 lb 14.6 oz (112 kg)   SpO2 93%   BMI 41.09 kg/m²     Recent Labs   Lab 04/02/25 2226 04/03/25 0411 04/04/25  0644 04/05/25  0838   RBC 4.93 4.82 4.45 3.94   HGB 12.6 12.1 11.4* 10.0*   HCT 39.1 37.2 34.6* 30.5*   MCV 79.3* 77.2* 77.8* 77.4*   MCH 25.6* 25.1* 25.6* 25.4*   MCHC 32.2 32.5 32.9 32.8   RDW 15.9 15.8 16.0 16.3   NEPRELIM 15.84*  --  13.40* 14.24*   WBC 17.9* 13.6* 17.0* 17.9*   .0 198.0 188.0 146.0*       Recent Labs   Lab 04/03/25 0411 04/04/25  0644 04/05/25  0838   * 112* 101*   BUN 14 13 11   CREATSERUM 1.68* 1.41* 1.13*   CA 9.0 8.9 9.1    137 135*   K 3.8 3.9 3.4*    105 100   CO2 20.0* 23.0 26.0       Patient seen and examined.  No issues overnight.  Vitals and labs stable.  On examination groins flat with no hematoma.  Neck incision flat with no hematoma and 20 cc output of serosanguineous drainage.  She is neurologically intact within the upper and lower extremities with the exception of some issue with abduction to her left upper arm and elbow.  This sensation and motor to her forearm and hand is entirely normal.  I informed her that this is a result of positioning given her large habitus and the length of the operation.  She has no other sensorimotor deficits whatsoever to suggest any stroke.  She is tolerating p.o., ambulating, and voiding. Emphasized importance of oral intake and ambulation. Will plan to remove central line once she has adequate peripheral access or PICC line.   She is working with physical therapy. Drain to remain in place until Monday. Will get a CTA to assess her repair today.  Once she has IV access and CTA is reviewed, pending no issues, she can transfer to floor with telemetry later this afternoon.  Plan for disposition to home or inpatient rehab on Monday.  Further management per hospitalist and other consulting services.      Magdy Palm MD  [facility]  Vascular Surgery

## 2025-04-05 NOTE — PROGRESS NOTES
[facility] Critical Care Progress Note    Alisha Wilde Patient Status:  Inpatient    10/25/1965 MRN [id number]   [facility] 4SW-A Attending Jade Cameron MD   Hosp Day # 6 PCP Bridger Avendano MD     Subjective:  Alisha Wilde is a(n) 59 year old female who is admitted on 3/30/2025 for Dissection of aorta [I71.0]      Overnight: No acute events overnight no significant changes    OBJECTIVE  Vitals:  /75   Pulse 105   Temp 99.6 °F (37.6 °C) (Temporal)   Resp 19   Wt 246 lb 14.6 oz (112 kg)   SpO2 92%   BMI 41.09 kg/m²           Physical Exam:    General Appearance: alert, well appearing, and in no distress  Lungs: clear to auscultation bilaterally  Heart: regular rate and rhythm, S1, S2 normal, no murmur, click, rub or gallop  Abdomen: soft, non-tender, without masses or organomegaly  Extremities: Atraumatic, no cyanosis or edema, capillary refill <3 sec.    Pulses: 2+ and symmetric all extremities  Skin: Skin color, texture, turgor normal for ethnicity, no rashes or lesions, warm and dry  Neurologic: Grossly normal some LUE weakness, moving hand but weaker compared to right    Lab Data Review:  Recent Labs     25  0411 25  0644   WBC 5.1 5.6 17.9* 13.6* 17.0*   HGB 9.8* 9.1* 12.6 12.1 11.4*   .0 227.0 236.0 198.0 188.0     Recent Labs     25  0411 25  0644   * 135* 137 139 137   K 4.7 4.2 4.2 3.8 3.9   * 113* 108 109 105   CO2 14.0* 15.0* 18.0* 20.0* 23.0   BUN 14 16 14 14 13   CREATSERUM 1.70* 1.88* 1.67* 1.68* 1.41*     Recent Labs   Lab 25  0757 25  1631 25  0320 25  1739 25  0411   PTP 14.2   < > 14.8 14.8 15.1*   INR 1.04   < > 1.15 1.14 1.18   PTT 29.6  --   --  31.1 27.6    < > = values in this interval not displayed.       Cultures:   No results found for this visit on 25.    Radiology:  XR CHEST AP PORTABLE   (DKJ=[id number])  Narrative: PROCEDURE:  XR CHEST AP PORTABLE  (CPT=71045)     TECHNIQUE:  AP chest radiograph was obtained.     COMPARISON:  [facility] , XR, XR CHEST AP PORTABLE  (CPT=71045), 4/02/2025, 5:36 AM.     INDICATIONS:  Follow-up     PATIENT STATED HISTORY: (As transcribed by Technologist)  Patient offered no additional history at this time.          FINDINGS:  Lung volumes are small.  Dense patchy consolidation retrocardiac and left base with silhouette of the diaphragm contour.  Stable cardiomediastinal contour, with mild cardiomegaly.  The pulmonary vessels appear smaller and better defined.    Sternotomy changes are seen.       New changes consistent with placement of a thoracic endo graft.  Additional stents appear to be at the central great vessels.  There is a right IJ vascular sheath/catheter.  Tip is at the upper SVC, brachiocephalic vein confluence level.  New surgical   clips are seen at the left neck.                         Impression: CONCLUSION:    1. New thoracic endograft placement.  2. Suspect atelectasis in the lower left lung.  3. Continued clinical correlation and follow-up recommended.          LOCATION:  [facility]                 Dictated by (CST): Zeke Simmons MD on 4/04/2025 at 6:30 AM       Finalized by (CST): Zeke Simmons MD on 4/04/2025 at 6:32 AM         Medications reviewed     Assessment and Plan:   Patient Active Problem List   Diagnosis    Hypercholesteremia    Benign essential HTN    Vitamin D deficiency    Adjustment disorder with mixed anxiety and depressed mood    Controlled type 2 diabetes mellitus without complication, without long-term current use of insulin (HCC)    Obesity (BMI 30-39.9)    Polyp of colon    Flat feet, bilateral    Myalgia due to statin    Dissection of ascending aorta (HCC)    Stage 3 chronic kidney disease (HCC)    Iron deficiency    Constipation    Leg swelling    Gastroesophageal reflux disease    Dissection of aorta, unspecified portion of aorta (HCC)     Aneurysm of aortic arch     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 Will d/w vascular   - PT/OT       History of tube A dissection and prolonged respiratory failure s/p trach and peg    Hypertension   - BP goals per vascular, currently mildly hypotensive    HLD   - zetia    CKD stage 3    TANYA   - on home CPAP        The 21st Century Cures Act makes medical notes like these available to patients in the interest of transparency. Please be advised this is a medical document. Medical documents are intended to carry relevant information, facts as evident, and the clinical opinion of the practitioner. The medical note is intended as peer to peer communication and may appear blunt or direct. It is written in medical language and may contain abbreviations or verbiage that are unfamiliar.      Sb Campbell MD

## 2025-04-05 NOTE — PROGRESS NOTES
[facility]   part of [facility]     Hospitalist Progress Note     Alisha Wilde Patient Status:  Inpatient    10/25/1965 MRN [id number]   Location [facility] 4SW-A Attending Jade Cameron MD   Hosp Day # 6 PCP Bridger Avendano MD     Chief Complaint: back pain, chest pain     Subjective:     NO acute complaints. Asking when she will gain more strength in her LUE     Objective:      Vital signs:  Temp:  [99 °F (37.2 °C)-99.7 °F (37.6 °C)] 99.7 °F (37.6 °C)  Pulse:  [] 97  Resp:  [17-37] 20  BP: (114-163)/(67-88) 146/71  SpO2:  [81 %-100 %] 100 %  FiO2 (%):  [50 %] 50 %    Physical Exam:    General: NAD  HEENT: Oral mucosa moist  Heart: Normal S1 and S2. Regular rhythm  Lungs: CTAB  Abdomen: Soft, non-tender  Extremities: no edema  Neuro: NO facial asymmetry. Tongue protrudes midline. LUE abduction weakness     Diagnostic Data:    Labs:  Recent Labs   Lab 25  0757 25  1631 25  1632 25  0320 25  0449 25  1739 25  0235 25  2226 25  0411 25  0644 25  0838   WBC 7.9  --    < > 5.1   < > 5.1 5.6 17.9* 13.6* 17.0* 17.9*   HGB 9.8*  --    < > 9.4*   < > 9.8* 9.1* 12.6 12.1 11.4* 10.0*   MCV 79.5*  --    < > 79.1*   < > 79.4* 79.6* 79.3* 77.2* 77.8* 77.4*   .0  --    < > 249.0   < > 228.0 227.0 236.0 198.0 188.0 146.0*   INR 1.04 1.14  --  1.15  --  1.14  --   --  1.18  --   --     < > = values in this interval not displayed.       Recent Labs   Lab 25  1739 25  0235 25  2226 25  0411 25  0644 25  0838   * 149*   < > 160* 112* 101*   BUN 14 16   < > 14 13 11   CREATSERUM 1.70* 1.88*   < > 1.68* 1.41* 1.13*   CA 8.8 8.8   < > 9.0 8.9 9.1   ALB 3.9 3.5  --   --  3.7  --    * 135*   < > 139 137 135*   K 4.7 4.2   < > 3.8 3.9 3.4*   * 113*   < > 109 105 100   CO2 14.0* 15.0*   < > 20.0* 23.0 26.0   ALKPHO 58 56  --   --  62  --    AST 12 9  --   --  11  --    ALT <7* 9*  --   --   <7*  --    BILT 0.2 0.2  --   --  0.5  --    TP 6.7 6.3  --   --  6.3  --     < > = values in this interval not displayed.       Estimated Glomerular Filtration Rate: 55 mL/min/1.73m2 (A) (result from lab).    Recent Labs   Lab 03/30/25  0757   TROPHS 8       Recent Labs   Lab 03/31/25  0320 04/01/25  1739 04/03/25  0411   PTP 14.8 14.8 15.1*   INR 1.15 1.14 1.18                  Microbiology    No results found for this visit on 03/30/25.      Imaging: Reviewed in Epic.    Medications:    ipratropium-albuterol  3 mL Nebulization Q6H WA    ampicillin-sulbactam  3 g Intravenous Q6H    pantoprazole  40 mg Intravenous Q24H    multivitamin  1 tablet Oral Daily    docusate sodium  100 mg Oral BID    polyethylene glycol (PEG 3350)  17 g Oral Daily       Assessment & Plan:      #Aortic arch dissection with aneurysm   #L renal artery dissection  Vascular surgery following  S/p left carotid to subclavian transposition, endovascular arch repair, stenting of the left renal, left iliac  Repeat CTA 4/5 with some dissection within the left subclavian artery as well as some persistent flap with suboptimal expansion of the stent graft - plan for intervention later this week      #Prior hx of Type A aortic dissection  S/p repair 11/2024  Post op complications with rep failure and NAIMA needing trach and peg that are now removed    #LUE weakness   Suspected 2/2 to dissection and cord ischemia  -Monitor      #Ess HTN    #Dyslipidemia  zetia     #CKD3     #TANYA     #Obesity, BMI 36    Jade Cameron MD    Supplementary Documentation:     Quality:  DVT Mechanical Prophylaxis:   SCDs, Early ambuation  DVT Pharmacologic Prophylaxis   Medication    heparin (Porcine) 17667 units/250mL infusion ACS/AFIB CONTINUOUS                Code Status: Full Code  Webb: External urinary catheter in place  Webb Duration (in days): 2  Central line:    BRANDON:     Discharge is dependent on: course  At this point Ms. Wilde is expected to be discharge to:  unclear    The 21st Century Cures Act makes medical notes like these available to patients in the interest of transparency. Please be advised this is a medical document. Medical documents are intended to carry relevant information, facts as evident, and the clinical opinion of the practitioner. The medical note is intended as peer to peer communication and may appear blunt or direct. It is written in medical language and may contain abbreviations or verbiage that are unfamiliar.     Dietitian Malnutrition Assessment    Evaluation for Malnutrition: Criteria for non-severe malnutrition diagnosis-         chronic illness related to   Wt loss 7.5% in 3 months., Energy intake less than 75% for greater than 1 month.       RD Malnutrition Care Plan: Intiated ONS (oral nutritional supplements)., Ordered multivitamin.    Body Fat/Muscle Mass:          Physician Assessment     Patient has a diagnosis of moderate malnutrition

## 2025-04-05 NOTE — PLAN OF CARE
PT A/O, 94% ON RA W/A, CPAP AT NIGHT, LUNGS CLEAR, IS , ST, TEMP 99.0, /73, LT SIDE WEAKER, LT AND RT GROIN DRESSING D/I, NECK DRESSING D/I, SERVANDO DRAIN TO BULB WITH 20CC SEROSANGUINOUS DRAINAGE, INCONTINENT X1, VOIDING YELLOW URINE PER PURE WICK, UP TO BATHROOM WITH 1-2 ASSIST/WALKER, PASSING FLATUS, POOR APPETITE, REQUESTED SIMETHICONE, HAD SMALL EMESIS FOLLOWING, GIVEN ZOFRAN AND DILAUDID FOR PAIN, RATES PAIN AT 4, INSTRUCTED PT ON POC     Problem: CARDIOVASCULAR - ADULT  Goal: Absence of cardiac arrhythmias or at baseline  Description: INTERVENTIONS:- Continuous cardiac monitoring, monitor vital signs, obtain 12 lead EKG if indicated- Evaluate effectiveness of antiarrhythmic and heart rate control medications as ordered- Initiate emergency measures for life threatening arrhythmias- Monitor electrolytes and administer replacement therapy as ordered  Outcome: Progressing

## 2025-04-05 NOTE — PROGRESS NOTES
Vascular surgery note    I evaluated the CTA.  There is some dissection within the left subclavian artery as well as some persistent flap with suboptimal expansion of the stent graft.  We will plan for intervention later this week for optimization. Plan for acs protocol heparin for now and close monitoring in ICU after aspiration event.

## 2025-04-05 NOTE — CM/SW NOTE
SW acknowledged SW consult for PMR. Spoke with RN, Dr. Palm requesting PMR consult. Placed PMR order and updated Toma from [facility].     RENALDO/DIMPLE to remain available for support and/or discharge planning.    Katy GALAN LCSW  Discharge Planner

## 2025-04-05 NOTE — PROGRESS NOTES
[facility]     Alisha Wilde Patient Status:  Inpatient    10/25/1965 MRN [id number]   Location [facility] 4SW-A Attending Jade Cameron MD   Hosp Day # 5 PCP Bridger Avendano MD     Chief Complaint: back pain, chest pain     Subjective:     Pain is controlled.      Objective:      Vital signs:  Temp:  [97.3 °F (36.3 °C)-99.4 °F (37.4 °C)] 99.2 °F (37.3 °C)  Pulse:  [] 102  Resp:  [11-29] 23  BP: (109-142)/() 132/73  SpO2:  [94 %-100 %] 94 %  AO: (115-143)/(59-86) 125/86    Physical Exam:    General: NAD  HEENT: Oral mucosa moist  Heart: Normal S1 and S2. Regular rhythm  Lungs: CTAB  Abdomen: Soft, non-tender  Extremities: no edema  Neuro: NO facial asymmetry. Tongue protrudes midline. LUE weakness 4/5. 5/5 RUE and b/l LE     Diagnostic Data:    Labs:  Recent Labs   Lab 25  0757 25  1631 25  1632 25  0320 25  0449 25  0235 25  2226 25  0411 25  0644   WBC 7.9  --    < > 5.1   < > 5.1 5.6 17.9* 13.6* 17.0*   HGB 9.8*  --    < > 9.4*   < > 9.8* 9.1* 12.6 12.1 11.4*   MCV 79.5*  --    < > 79.1*   < > 79.4* 79.6* 79.3* 77.2* 77.8*   .0  --    < > 249.0   < > 228.0 227.0 236.0 198.0 188.0   INR 1.04 1.14  --  1.15  --  1.14  --   --  1.18  --     < > = values in this interval not displayed.       Recent Labs   Lab 25  0235 25  2226 25  0411 25  0644   * 149* 175* 160* 112*   BUN 14 16 14 14 13   CREATSERUM 1.70* 1.88* 1.67* 1.68* 1.41*   CA 8.8 8.8 9.4 9.0 8.9   ALB 3.9 3.5  --   --  3.7   * 135* 137 139 137   K 4.7 4.2 4.2 3.8 3.9   * 113* 108 109 105   CO2 14.0* 15.0* 18.0* 20.0* 23.0   ALKPHO 58 56  --   --  62   AST 12 9  --   --  11   ALT <7* 9*  --   --  <7*   BILT 0.2 0.2  --   --  0.5   TP 6.7 6.3  --   --  6.3       Estimated Glomerular Filtration Rate: 42 mL/min/1.73m2 (A) (result from lab).    Recent  Labs   Lab 03/30/25  0757   TROPHS 8       Recent Labs   Lab 03/31/25  0320 04/01/25  1739 04/03/25  0411   PTP 14.8 14.8 15.1*   INR 1.15 1.14 1.18                  Microbiology    No results found for this visit on 03/30/25.      Imaging: Reviewed in Epic.    Medications:    [START ON 4/5/2025] multivitamin  1 tablet Oral Daily    docusate sodium  100 mg Oral BID    polyethylene glycol (PEG 3350)  17 g Oral Daily    heparin  5,000 Units Subcutaneous 2 times per day       Assessment & Plan:      #Aortic arch dissection with aneurysm   #L renal artery dissection  Vascular surgery following  S/p left carotid to subclavian transposition, endovascular arch repair, stenting of the left renal, left iliac  BP goals per vascular surgery   Bowel regimen  Pain management     #Prior hx of Type A aortic dissection  S/p repair 11/2024  Post op complications with rep failure and NAIMA needing trach and peg that are now removed    #LUE weakness   Suspected 2/2 to dissection and cord ischemia  -Monitor      #Ess HTN    #Dyslipidemia  zetia     #CKD3     #TANYA     #Obesity, BMI 36    Jade Cameron MD    Supplementary Documentation:     Quality:  DVT Mechanical Prophylaxis:   SCDs, Early ambuation  DVT Pharmacologic Prophylaxis   Medication    heparin (Porcine) 5000 UNIT/ML injection 5,000 Units                Code Status: Full Code  Webb: No urinary catheter in place  Webb Duration (in days): 2  Central line:    BRANDON:     Discharge is dependent on: course  At this point Ms. Wilde is expected to be discharge to: unclear    The 21st Century Cures Act makes medical notes like these available to patients in the interest of transparency. Please be advised this is a medical document. Medical documents are intended to carry relevant information, facts as evident, and the clinical opinion of the practitioner. The medical note is intended as peer to peer communication and may appear blunt or direct. It is written in medical language and may  contain abbreviations or verbiage that are unfamiliar.     Dietitian Malnutrition Assessment    Evaluation for Malnutrition: Criteria for non-severe malnutrition diagnosis-         chronic illness related to   Wt loss 7.5% in 3 months., Energy intake less than 75% for greater than 1 month.       RD Malnutrition Care Plan: Intiated ONS (oral nutritional supplements)., Ordered multivitamin.    Body Fat/Muscle Mass:          Physician Assessment     Patient has a diagnosis of moderate malnutrition

## 2025-04-06 LAB
ALBUMIN SERPL-MCNC: 3.6 G/DL (ref 3.2–4.8)
ALBUMIN/GLOB SERPL: 1.4 {RATIO} (ref 1–2)
ALP LIVER SERPL-CCNC: 60 U/L
ALT SERPL-CCNC: <7 U/L
ANION GAP SERPL CALC-SCNC: 9 MMOL/L (ref 0–18)
ANTIBODY SCREEN: NEGATIVE
APTT PPP: 49.7 SECONDS (ref 23–36)
AST SERPL-CCNC: 9 U/L (ref ?–34)
BASOPHILS # BLD AUTO: 0.03 X10(3) UL (ref 0–0.2)
BASOPHILS NFR BLD AUTO: 0.2 %
BILIRUB SERPL-MCNC: 0.6 MG/DL (ref 0.2–1.1)
BUN BLD-MCNC: 9 MG/DL (ref 9–23)
CALCIUM BLD-MCNC: 8.9 MG/DL (ref 8.7–10.6)
CHLORIDE SERPL-SCNC: 101 MMOL/L (ref 98–112)
CO2 SERPL-SCNC: 26 MMOL/L (ref 21–32)
CREAT BLD-MCNC: 1.07 MG/DL
EGFRCR SERPLBLD CKD-EPI 2021: 59 ML/MIN/1.73M2 (ref 60–?)
EOSINOPHIL # BLD AUTO: 0.05 X10(3) UL (ref 0–0.7)
EOSINOPHIL NFR BLD AUTO: 0.3 %
ERYTHROCYTE [DISTWIDTH] IN BLOOD BY AUTOMATED COUNT: 16.3 %
GLOBULIN PLAS-MCNC: 2.5 G/DL (ref 2–3.5)
GLUCOSE BLD-MCNC: 94 MG/DL (ref 70–99)
HCT VFR BLD AUTO: 27.8 %
HGB BLD-MCNC: 8.9 G/DL
IMM GRANULOCYTES # BLD AUTO: 0.1 X10(3) UL (ref 0–1)
IMM GRANULOCYTES NFR BLD: 0.6 %
LYMPHOCYTES # BLD AUTO: 1.1 X10(3) UL (ref 1–4)
LYMPHOCYTES NFR BLD AUTO: 6.7 %
MCH RBC QN AUTO: 25 PG (ref 26–34)
MCHC RBC AUTO-ENTMCNC: 32 G/DL (ref 31–37)
MCV RBC AUTO: 78.1 FL
MONOCYTES # BLD AUTO: 2.03 X10(3) UL (ref 0.1–1)
MONOCYTES NFR BLD AUTO: 12.4 %
NEUTROPHILS # BLD AUTO: 13.06 X10 (3) UL (ref 1.5–7.7)
NEUTROPHILS # BLD AUTO: 13.06 X10(3) UL (ref 1.5–7.7)
NEUTROPHILS NFR BLD AUTO: 79.8 %
OSMOLALITY SERPL CALC.SUM OF ELEC: 280 MOSM/KG (ref 275–295)
PLATELET # BLD AUTO: 153 10(3)UL (ref 150–450)
PLATELET # BLD AUTO: 153 10(3)UL (ref 150–450)
POTASSIUM SERPL-SCNC: 3.4 MMOL/L (ref 3.5–5.1)
POTASSIUM SERPL-SCNC: 3.4 MMOL/L (ref 3.5–5.1)
PROT SERPL-MCNC: 6.1 G/DL (ref 5.7–8.2)
RBC # BLD AUTO: 3.56 X10(6)UL
RH BLOOD TYPE: POSITIVE
SODIUM SERPL-SCNC: 136 MMOL/L (ref 136–145)
WBC # BLD AUTO: 16.4 X10(3) UL (ref 4–11)

## 2025-04-06 PROCEDURE — 99233 SBSQ HOSP IP/OBS HIGH 50: CPT | Performed by: INTERNAL MEDICINE

## 2025-04-06 NOTE — PROGRESS NOTES
Vascular Surgery Progress Note    /71   Pulse 101   Temp (!) 101 °F (38.3 °C)   Resp 14   Wt 236 lb 5.3 oz (107.2 kg)   SpO2 96%   BMI 39.33 kg/m²     Recent Labs   Lab 04/04/25  0644 04/05/25  0838 04/06/25  0341   RBC 4.45 3.94 3.56*   HGB 11.4* 10.0* 8.9*   HCT 34.6* 30.5* 27.8*   MCV 77.8* 77.4* 78.1*   MCH 25.6* 25.4* 25.0*   MCHC 32.9 32.8 32.0   RDW 16.0 16.3 16.3   NEPRELIM 13.40* 14.24* 13.06*   WBC 17.0* 17.9* 16.4*   .0 146.0* 153.0  153.0       Recent Labs   Lab 04/04/25  0644 04/05/25  0838 04/06/25  0341   * 101* 94   BUN 13 11 9   CREATSERUM 1.41* 1.13* 1.07*   CA 8.9 9.1 8.9    135* 136   K 3.9 3.4* 3.4*  3.4*    100 101   CO2 23.0 26.0 26.0       Patient with aspiration episode yesterday and CTA.  No other issues overnight.  Patient feels much better today.  Vitals and labs stable.  On examination neck flat with no hematoma.  Some slightly increased serosanguineous drainage after initiation of heparin drip.  She is neurologically intact at her baseline with stable weakness in abduction due to her positioning.  I discussed the CTA findings.  We will plan for thoracic stent extension tomorrow with left subclavian stent through left radial access.  I discussed the risk and benefits.  She affirms her understanding.  She will be n.p.o. after breakfast.  Emphasized importance of working with incentive spirometer and with ambulation.  She will work with nursing for ambulation today.    Magdy Palm MD  [facility]  Vascular Surgery

## 2025-04-06 NOTE — PLAN OF CARE
Assumed care of pt at change of shift. Pt AO x4, vitals stable. Pt on Vapotherm, CPAP at night. Voids per purewick. See MAR and flowsheets for further details.

## 2025-04-06 NOTE — PROGRESS NOTES
[facility]   part of [facility]     Hospitalist Progress Note     Alisha Wilde Patient Status:  Inpatient    10/25/1965 MRN [id number]   Location [facility] 4SW-A Attending Jade Cameron MD   Hosp Day # 7 PCP Bridger Avendano MD     Chief Complaint: back pain, chest pain     Subjective:     Aspiration event yesterday during CT. Tmax 101. She feels cold and is shaking    Objective:      Vital signs:  Temp:  [99 °F (37.2 °C)-101 °F (38.3 °C)] 99 °F (37.2 °C)  Pulse:  [] 95  Resp:  [14-34] 22  BP: (102-151)/(63-77) 102/63  SpO2:  [91 %-100 %] 98 %  FiO2 (%):  [40 %-50 %] 40 %    Physical Exam:    General: NAD  HEENT: Oral mucosa moist  Heart: Normal S1 and S2. Regular rhythm  Lungs: CTAB  Abdomen: Soft, non-tender  Extremities: no edema  Neuro: NO facial asymmetry. Tongue protrudes midline. LUE abduction weakness     Diagnostic Data:    Labs:  Recent Labs   Lab 25  1631 25  1632 25  0320 25  0449 25  1739 25  0235 25  2226 25  0411 25  0644 25  0838 25  0341   WBC  --    < > 5.1   < > 5.1   < > 17.9* 13.6* 17.0* 17.9* 16.4*   HGB  --    < > 9.4*   < > 9.8*   < > 12.6 12.1 11.4* 10.0* 8.9*   MCV  --    < > 79.1*   < > 79.4*   < > 79.3* 77.2* 77.8* 77.4* 78.1*   PLT  --    < > 249.0   < > 228.0   < > 236.0 198.0 188.0 146.0* 153.0  153.0   INR 1.14  --  1.15  --  1.14  --   --  1.18  --   --   --     < > = values in this interval not displayed.       Recent Labs   Lab 25  0235 25  2226 25  0644 25  0838 25  0341   *   < > 112* 101* 94   BUN 16   < > 13 11 9   CREATSERUM 1.88*   < > 1.41* 1.13* 1.07*   CA 8.8   < > 8.9 9.1 8.9   ALB 3.5  --  3.7  --  3.6   *   < > 137 135* 136   K 4.2   < > 3.9 3.4* 3.4*  3.4*   *   < > 105 100 101   CO2 15.0*   < > 23.0 26.0 26.0   ALKPHO 56  --  62  --  60   AST 9  --  11  --  9   ALT 9*  --  <7*  --  <7*   BILT 0.2  --  0.5  --  0.6   TP 6.3  --   6.3  --  6.1    < > = values in this interval not displayed.       Estimated Glomerular Filtration Rate: 59 mL/min/1.73m2 (A) (result from lab).    No results for input(s): \"TROP\", \"TROPHS\", \"CK\" in the last 168 hours.      Recent Labs   Lab 03/31/25  0320 04/01/25  1739 04/03/25  0411   PTP 14.8 14.8 15.1*   INR 1.15 1.14 1.18                  Microbiology    No results found for this visit on 03/30/25.      Imaging: Reviewed in Epic.    Medications:    ipratropium-albuterol  3 mL Nebulization Q6H WA    ampicillin-sulbactam  3 g Intravenous Q6H    pantoprazole  40 mg Intravenous Q24H    multivitamin  1 tablet Oral Daily    docusate sodium  100 mg Oral BID    polyethylene glycol (PEG 3350)  17 g Oral Daily       Assessment & Plan:      #Aortic arch dissection with aneurysm   #L renal artery dissection  Vascular surgery following  S/p left carotid to subclavian transposition, endovascular arch repair, stenting of the left renal, left iliac  Repeat CTA 4/5 with some dissection within the left subclavian artery as well as some persistent flap with suboptimal expansion of the stent graft - plan for intervention tomorrow     #Prior hx of Type A aortic dissection  S/p repair 11/2024  Post op complications with rep failure and NAIMA needing trach and peg that are now removed    #Sepsis 2/2 aspiration pneumonia - IV unasyn 4/5 -   #Acute hypoxic respiratory failure     #LUE weakness   Suspected 2/2 to dissection and cord ischemia  -Monitor      #Ess HTN    #Dyslipidemia  zetia     #CKD3     #TANYA     #Obesity, BMI 36    Jade Cameron MD    Supplementary Documentation:     Quality:  DVT Mechanical Prophylaxis:   SCDs, Early ambuation  DVT Pharmacologic Prophylaxis   Medication    heparin (Porcine) 74154 units/250mL infusion ACS/AFIB CONTINUOUS                Code Status: Full Code  Webb: External urinary catheter in place  Webb Duration (in days): 2  Central line:    BRANDON:     Discharge is dependent on: course  At this point  Ms. Wilde is expected to be discharge to: unclear    The 21st Century Cures Act makes medical notes like these available to patients in the interest of transparency. Please be advised this is a medical document. Medical documents are intended to carry relevant information, facts as evident, and the clinical opinion of the practitioner. The medical note is intended as peer to peer communication and may appear blunt or direct. It is written in medical language and may contain abbreviations or verbiage that are unfamiliar.     Dietitian Malnutrition Assessment    Evaluation for Malnutrition: Criteria for non-severe malnutrition diagnosis-         chronic illness related to   Wt loss 7.5% in 3 months., Energy intake less than 75% for greater than 1 month.       RD Malnutrition Care Plan: Intiated ONS (oral nutritional supplements)., Ordered multivitamin.    Body Fat/Muscle Mass:          Physician Assessment     Patient has a diagnosis of moderate malnutrition

## 2025-04-06 NOTE — PLAN OF CARE
Received patient in bed alert and oriented X 4. No signs of distress on room air. Ambulated in hallway. Went down for CT scan, episode of emesis, aspirated. MD aware, placed on vapotherm, cxr, and antibiotics started. Medications given per MAR, documentation per flowsheets.

## 2025-04-06 NOTE — CONSULTS
PHYSICAL MEDICINE AND REHABILITATION CONSULTATION   Remote Consult    CC: Impaired mobility and ADL dysfunction secondary to <principal problem not specified>    HPI:   Alisha Wilde is a 59 year old female with history of hypertension, hyperlipidemia, CKD stage III, TANYA, prior type a aortic dissection s/p repair on 11/21/2024 with prolonged respiratory failure s/p trach and PEG who initially presented to [facility] ED on 3/30 with back pain/chest pain that seem to radiate through her chest wall.  Pain was located midsternal.  In the ED, CT scan of chest showed type B aortic dissection.  Admitted for workup.  Vascular surgery consulted. History of tracheostomy and PEG tube now removed.  Per vascular surgery consult, there was aneurysmal degeneration of type B dissection with also worsening dissection of left renal artery.  Patient was transferred to [facility] for surgical invention.  On 4/2, patient underwent left carotid to subclavian transposition, endovascular arch repair with complete exclusion of aneurysm and resolution of dissection, stenting of left renal and bilateral liac veins with neuromonitoring.  Repeat CTA 4/5 with some dissection within left subclavian artery and persistent flap with suboptimal expansion of the stent graft, plan for intervention 4/7 with vascular surgery for thoracic stent extension with left subclavian stent through left radial access. Course complicated by aspiration event yesterday during CT, Tmax 101.     PM&R has now been consulted in order to assess patient's functional status and make recommendations for rehabilitation.      OBJECTIVE:    Vital Signs:      4/6/2025     2:00 PM 4/6/2025     3:00 PM   Vitals History   /80 124/68   Pulse 99 98   Resp 21 23   Temp  99.6 °F (37.6 °C)   SpO2 97 % 95 %      Current medications:   [COMPLETED] potassium chloride 40 mEq in 250mL sodium chloride 0.9% IVPB premix  40 mEq Intravenous Once    [COMPLETED] potassium chloride 40 mEq  in 250mL sodium chloride 0.9% IVPB premix  40 mEq Intravenous Once    [COMPLETED] iopamidol 76% (ISOVUE-370) injection for power injector  100 mL Intravenous ONCE PRN    ipratropium-albuterol (Duoneb) 0.5-2.5 (3) MG/3ML inhalation solution 3 mL  3 mL Nebulization Q6H WA    ampicillin-sulbactam (Unasyn) 3 g in sodium chloride 0.9% 100mL IVPB-ADD  3 g Intravenous Q6H    pantoprazole (Protonix) 40 mg in sodium chloride 0.9% PF 10 mL IV push  40 mg Intravenous Q24H    [COMPLETED] heparin (Porcine) 1000 UNIT/ML injection - BOLUS IV 5,000 Units  5,000 Units Intravenous Once    [COMPLETED] heparin (Porcine) 16183 units/250 mL infusion (ACS/AFIB) INITIAL DOSE  1,000 Units/hr Intravenous Once    heparin (Porcine) 39240 units/250mL infusion ACS/AFIB CONTINUOUS  200-3,000 Units/hr Intravenous Continuous    bisacodyl (Dulcolax) 10 MG rectal suppository 10 mg  10 mg Rectal Daily PRN    multivitamin (Tab-A-Treasure/Beta Carotene) tab 1 tablet  1 tablet Oral Daily    simethicone (Mylicon) chewable tab 160 mg  160 mg Oral QID PRN    [COMPLETED] potassium chloride 20 mEq/100mL IVPB premix 20 mEq  20 mEq Intravenous Once    docusate sodium (Colace) cap 100 mg  100 mg Oral BID    polyethylene glycol (PEG 3350) (Miralax) 17 g oral packet 17 g  17 g Oral Daily    [COMPLETED] bisacodyl (Dulcolax) 10 MG rectal suppository 10 mg  10 mg Rectal Once    [COMPLETED] furosemide (Lasix) 10 mg/mL injection 40 mg  40 mg Intravenous Once    [COMPLETED] verapamil (Isoptin) 2.5 mg/mL injection        [COMPLETED] Nitroglycerin in D5W 200-5 MCG/ML-% injection        [] lactated ringers IV bolus 500 mL  500 mL Intravenous Once PRN    [] atropine 0.1 MG/ML injection 0.5 mg  0.5 mg Intravenous PRN    [] naloxone (Narcan) 0.4 MG/ML injection 0.08 mg  0.08 mg Intravenous PRN    [] fentaNYL (Sublimaze) 50 mcg/mL injection 25 mcg  25 mcg Intravenous Q5 Min PRN    Or    [] fentaNYL (Sublimaze) 50 mcg/mL injection 50 mcg  50 mcg  Intravenous Q5 Min PRN    [] HYDROmorphone (Dilaudid) 1 MG/ML injection 0.2 mg  0.2 mg Intravenous Q5 Min PRN    Or    [] HYDROmorphone (Dilaudid) 1 MG/ML injection 0.4 mg  0.4 mg Intravenous Q5 Min PRN    Or    [] HYDROmorphone (Dilaudid) 1 MG/ML injection 0.6 mg  0.6 mg Intravenous Q5 Min PRN    [COMPLETED] iodixanol (VISIPAQUE) 320 MG/ML injection 150 mL  150 mL Injection ONCE PRN    acetaminophen (Tylenol) tab 650 mg  650 mg Oral Q4H PRN    Or    HYDROcodone-acetaminophen (Norco) 5-325 MG per tab 1 tablet  1 tablet Oral Q4H PRN    Or    HYDROcodone-acetaminophen (Norco) 5-325 MG per tab 2 tablet  2 tablet Oral Q4H PRN    ondansetron (Zofran) 4 MG/2ML injection 4 mg  4 mg Intravenous Q6H PRN    prochlorperazine (Compazine) 10 MG/2ML injection 5 mg  5 mg Intravenous Q8H PRN    HYDROmorphone (Dilaudid) 1 MG/ML injection 0.2 mg  0.2 mg Intravenous Q2H PRN    Or    HYDROmorphone (Dilaudid) 1 MG/ML injection 0.4 mg  0.4 mg Intravenous Q2H PRN    Or    HYDROmorphone (Dilaudid) 1 MG/ML injection 0.8 mg  0.8 mg Intravenous Q2H PRN    niCARdipine in sodium chloride 0.86% (carDENE) 20 mg/200mL infusion premix  5-15 mg/hr Intravenous Continuous PRN    norepinephrine (Levophed) 4 mg/250mL infusion premix  0.5-30 mcg/min Intravenous Continuous PRN    [COMPLETED] ceFAZolin (Ancef) 2g in 10mL IV syringe premix  2 g Intravenous Q8H    [COMPLETED] furosemide (Lasix) 10 mg/mL injection 40 mg  40 mg Intravenous Once    [COMPLETED] iopamidol 76% (ISOVUE-370) injection for power injector  100 mL Intravenous ONCE PRN    [COMPLETED] iopamidol 76% (ISOVUE-370) injection for power injector  80 mL Intravenous ONCE PRN    [] nitroGLYCERIN in dextrose 5% 50 mg/250mL infusion premix        [COMPLETED] lidocaine (Xylocaine) 1 % injection  10 mL Intradermal Once    [COMPLETED] potassium phosphate dibasic 15 mmol in sodium chloride 0.9% 250 mL IVPB  15 mmol Intravenous Once    Followed by    [COMPLETED] potassium  chloride 20 mEq/100mL IVPB premix 20 mEq  20 mEq Intravenous Once     Allergies:  Allergies[1]    Past Medical History:  Past Medical History:    Chronic kidney disease (CKD)    Esophageal reflux    High blood pressure    High cholesterol    HYPERTENSION    Hypertension    Unspecified essential hypertension     Past Surgical History:  Past Surgical History:   Procedure Laterality Date    Anesth,open heart surgery  2024    Colonoscopy N/A 2018    Procedure: COLONOSCOPY;  Surgeon: Magdy Webber MD;  [facility]: [facility] ENDOSCOPY    Endometrial ablation      child passed away for 5 mins          1    Other surgical history  2011    cysto-Dr. Lacey -- pt denies     Family History:   Family History   Problem Relation Age of Onset    Hypertension Mother     Heart Disorder Mother 70    Other (Other) Mother         kidney failure    Hypertension Father     Hypertension Maternal Grandfather     Cancer Neg     Diabetes Neg     Glaucoma Neg     Macular degeneration Neg      Social History:  Works for [employer]    Illicit/Alcohol Use:  History   Alcohol Use Not Currently     Comment: every day  NOT DRINKING FOR LAS 4 MONTHS     History   Drug Use No       FUNCTIONAL STATUS:    Premorbid functional status-independent with ADLs, modified independent with mobility utilizes a cane    Current Function:    Precautions:   No orders of the defined types were placed in this encounter.    Mobility:    Bed Mobility:  Supine to Sit : Stand-by Assist    Transfers:  Supine to Sit : Stand-by Assist  Sit to Stand: Edge of Bed    Ambulation:  Gait Assistance: Minimum assistance  Distance (ft): 3  Assistive Device: Rolling walker  Pattern: Shuffle     ADLs:  Eating: Modified Independent (increased time for opening packages)  LB Dressing Seated: Supervision (slip on shoes EOB)    THERAPIST COMMENTS: ADL/mobility as noted. Pt c/o mild dizziness EOB, does not worsen with standing, BP emily throughout. CGA to  chair via RW. Engaged in eating task. Pt uses RUE to lift LUE, but then able to incorporate LUE successfully into eating tasks. /pinch strength symmetrical.        PHYSICAL EXAM:    No physical examination performed as this stephanie remote consult.      Data Review:    Lab Results   Component Value Date    WBC 16.4 04/06/2025    HGB 8.9 04/06/2025    HCT 27.8 04/06/2025    .0 04/06/2025    .0 04/06/2025    CREATSERUM 1.07 04/06/2025    BUN 9 04/06/2025     04/06/2025    K 3.4 04/06/2025    K 3.4 04/06/2025     04/06/2025    CO2 26.0 04/06/2025    GLU 94 04/06/2025    CA 8.9 04/06/2025    ALB 3.6 04/06/2025    ALKPHO 60 04/06/2025    BILT 0.6 04/06/2025    TP 6.1 04/06/2025    AST 9 04/06/2025    ALT <7 04/06/2025    PTT 49.7 04/06/2025       ASSESSMENT:    REHAB DIAGNOSIS:  Impaired mobility/ADL dysfunction secondary to <principal problem not specified>  Active Problems:    Benign essential HTN    Dissection of aorta, unspecified portion of aorta (HCC)    Aneurysm of aortic arch    IMPAIRMENTS:    PT: balance, endurance, strength, gait, transfers, functional mobility  OT: Activities of daily living, functional mobility, balance, self care, upper extremity endurance, core strength, cognition, hygiene, upper extremity ROM    RECOMMENDATIONS:    Reviewed medical course and most recent therapy notes.  Patient has had complicated medical course since November regarding her aortic dissection.  Functionally, she is mostly requiring contact-guard assist to minimal assist.  Given that she is planned for repeat surgical intervention tomorrow, we will continue to follow for now and monitor for updated therapy progress postoperatively.    Continue PT, OT as indicated    Thank you for this consultation.    Aisha Justice MD  Brain Injury Medicine/PM&R  [facility]         [1]   Allergies  Allergen Reactions    Atorvastatin MYALGIA    Pravastatin MYALGIA    Rosuvastatin MYALGIA    Seasonal  Runny nose

## 2025-04-06 NOTE — PROGRESS NOTES
[facility] Critical [facility] Progress Note    Alisha Wilde Patient Status:  Inpatient    10/25/1965 MRN [id number]   [facility] 4SW-A Attending Jade Cameron MD   Hosp Day # 7 PCP Bridger Avendano MD     Subjective:  Alisha Wilde is a(n) 59 year old female who is admitted on 3/30/2025 for Dissection of aorta [I71.0]      Overnight: aspirated yesterday in CT feels better from yesterday    OBJECTIVE  Vitals:  /71   Pulse 101   Temp (!) 101 °F (38.3 °C)   Resp 14   Wt 236 lb 5.3 oz (107.2 kg)   SpO2 96%   BMI 39.33 kg/m²           Physical Exam:    General Appearance: alert, well appearing, and in no distress  Lungs: clear to auscultation bilaterally  Heart: regular rate and rhythm, S1, S2 normal, no murmur, click, rub or gallop  Abdomen: soft, non-tender, without masses or organomegaly  Extremities: Atraumatic, no cyanosis or edema, capillary refill <3 sec.    Pulses: 2+ and symmetric all extremities  Skin: Skin color, texture, turgor normal for ethnicity, no rashes or lesions, warm and dry  Neurologic: Grossly normal some LUE weakness, moving hand but weaker compared to right    Lab Data Review:  Recent Labs     25  0411 25  0644   WBC 5.1 5.6 17.9* 13.6* 17.0*   HGB 9.8* 9.1* 12.6 12.1 11.4*   .0 227.0 236.0 198.0 188.0     Recent Labs     25  0411 25  0644   * 135* 137 139 137   K 4.7 4.2 4.2 3.8 3.9   * 113* 108 109 105   CO2 14.0* 15.0* 18.0* 20.0* 23.0   BUN 14 16 14 14 13   CREATSERUM 1.70* 1.88* 1.67* 1.68* 1.41*     Recent Labs   Lab 25  0320 25  1739 25  1739 25  0411 25  1440 251 25  0341   PTP 14.8 14.8  --  15.1*  --   --   --    INR 1.15 1.14  --  1.18  --   --   --    PTT  --  31.1   < > 27.6 30.6 65.8* 49.7*    < > = values in this interval not displayed.       Cultures:   No results found for this visit on  03/30/25.    Radiology:  XR CHEST AP PORTABLE  (CPT=71045)  Narrative: PROCEDURE:  XR CHEST AP PORTABLE  (CPT=71045)     TECHNIQUE:  AP chest radiograph was obtained.     COMPARISON:  None.     INDICATIONS:  possible aspiration     PATIENT STATED HISTORY: (As transcribed by Technologist)                            Impression: CONCLUSION:  Consolidation/atelectasis retrocardiac left lung base with left pleural effusion.     Postsurgical changes of aortic stent graft with some mediastinal widening.        LOCATION:  [facility]                 Dictated by (CST): Stefani Nixon MD on 4/05/2025 at 2:02 PM       Finalized by (CST): Stefani Nixon MD on 4/05/2025 at 2:03 PM     CTA CHEST CTA ABDOMEN CTA PELVIS (CPT=71275/88853)  Narrative: PROCEDURE:  CTA CHEST CTA ABDOMEN CTA PELVIS (CPT=71275/89501)     COMPARISON:  [facility] , CT, CTA CHEST CTA ABDOMEN CTA PELVIS (CPT=71275/74809), 3/31/2025, 8:58 AM.     INDICATIONS:  assess endovascular aortic repair from chest to pelvis     TECHNIQUE:  CT (with CTA imaging) of the chest, abdomen, and pelvis were obtained. Multi-planar reformatted/3-D images were created to optimize visualization of vascular anatomy.  Dose reduction techniques were used. Dose information is transmitted to   the ACR (American College of Radiology) NRDR (National Radiology Data Registry) which includes the Dose Index Registry.     PATIENT STATED HISTORY:(As transcribed by Technologist)  Recent vascular repair from chest to pelvis.      CONTRAST USED:  100cc of Isovue 370     FINDINGS:       CHEST:    LUNGS:  Compressive atelectasis involving the posterior left upper lobe and lower lobes bilaterally.  There is respiratory motion bilaterally.  Scattered bilateral subsegmental atelectasis.    MEDIASTINUM:  Postsurgical changes in the mediastinum with soft tissue stranding and small amount pericardial fluid.  11 x 5 mm hyperdense focus abutting the descending limb of the aortic arch may represent a small endo  leak..    CARDIAC:  Small pericardial effusion   PLEURA:  Bilateral pleural effusions left greater than right   CHEST WALL:  Postsurgical changes of recent median sternotomy   AORTA:  Postsurgical changes of recent aortic dissection repair with aortic stent graft extending from the ascending aortic root through the aortic bifurcation and common iliac arteries bilaterally.  There is stent graft at the origin of the right   innominate artery as well as at the origin of the left subclavian artery.  The left carotid appears to now originate off the left subclavian artery.  There is a persistent defervesced section flap extending into the left subclavian artery.  There is some   luminal thrombus involving the proximal descending thoracic aorta.  VASCULATURE:  No visible pulmonary arterial thrombus or attenuation.       ABDOMEN/PELVIS:  LIVER:  Stable low-attenuation foci in the right left lobes of the liver likely representing cysts.    BILIARY:  A curious excretion of contrast into the gallbladder lumen.  No biliary ductal dilatation.  PANCREAS:  Homogeneous enhancement.  SPLEEN:  Normal caliber.  KIDNEYS:  Bilateral renal cysts.  Symmetric nephrograms.  No hydronephrosis    ADRENALS:  Normal.  AORTA/VASCULAR:  Aortic stent graft is patent.  The origins of the celiac, SMA and main renal arteries are patent.  The ZACARIAS is patent.    RETROPERITONEUM:  Scattered normal caliber periaortic lymph nodes.    BOWEL/MESENTERY:  There is fluid-filled small bowel in the abdomen and pelvis.  Moderate stool in the colon.  There is some free fluid in the mesentery and surrounding small bowel loops in the right and left anterior pelvis.  Evaluation of bowel is   limited.    ABDOMINAL WALL:  Mild diffuse anasarca.  Small fat containing umbilical hernia.  PELVIC ORGANS:  Asymmetric wall thickening of the urinary bladder wall with adjacent soft tissue stranding.  Recommend clinical correlation to exclude cystitis.  There is free fluid in  the presacral space.    BONES:  Mild degenerative changes in the lower lumbar facets.                    Impression: CONCLUSION:  Postsurgical changes of aortic dissection repair with stent graft.  The thoracic and abdominal aortic stent graft extends to the aortic bifurcation with stents in the proximal common iliac arteries bilaterally.  Additional stent graft   involving the right innominate and the left subclavian arteries identified.  The left common carotid artery now appears to arise off the left subclavian artery.  There is a dissection flap extending into the left subclavian artery.     The mesenteric vessels opacify with contrast without significant stenosis or occlusion.     There is pericardial fluid and some soft tissue stranding in the mediastinum.  Small 11 mm hyperdense focus abutting the descending thoracic aorta may represent a small endoleak, series 8, image 78.     There is bilateral atelectasis and pleural effusions left greater than right.     There is free fluid in the abdomen and pelvis pooling in the presacral space and right and left anterior pelvis.  This surrounds fluid-filled small bowel loops in the abdomen and pelvis.  No evidence for bowel obstruction.     Wall thickening of the urinary bladder with some adjacent soft tissue stranding is nonspecific there is some punctate gas in the nondependent urinary bladder likely iatrogenic.  Recommend clinical correlation to exclude cystitis.     LOCATION:  [facility]        Dictated by (CST): Stefani Nixon MD on 4/05/2025 at 1:45 PM       Finalized by (CST): Stefani Nixon MD on 4/05/2025 at 2:00 PM         Medications reviewed     Assessment and Plan:   Patient Active Problem List   Diagnosis    Hypercholesteremia    Benign essential HTN    Vitamin D deficiency    Adjustment disorder with mixed anxiety and depressed mood    Controlled type 2 diabetes mellitus without complication, without long-term current use of insulin (HCC)    Obesity (BMI  30-39.9)    Polyp of colon    Flat feet, bilateral    Myalgia due to statin    Dissection of ascending aorta (HCC)    Stage 3 chronic kidney disease (HCC)    Iron deficiency    Constipation    Leg swelling    Gastroesophageal reflux disease    Dissection of aorta, unspecified portion of aorta (HCC)    Aneurysm of aortic arch     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 s/p left carotid transposition, endovascular arch repair, stenting of the left renal, left iliac artery   - CT yesterday with potential small endoleak   - placed back on heparin ggt plan to take back to OR this week   - ongoing post op care, plan was to take out central line yesterday but with aspiration episode plus going back to OR leave in can take out after surgery   - PT/OT     Acute hypoxic respiratory failure 2/2 aspiration pna, aspirated in CT scan on 4/5   - started on unasyn    - continue duonebs   - wean oxygen as able    History of type A dissection and prolonged respiratory failure s/p trach and peg    Hypertension   - BP goals per vascular, currently mildly hypotensive    HLD   - zetia    CKD stage 3    TANYA   - on home CPAP    Should remain in ICU as plans to take back to OR    The 21st Century Cures Act makes medical notes like these available to patients in the interest of transparency. Please be advised this is a medical document. Medical documents are intended to carry relevant information, facts as evident, and the clinical opinion of the practitioner. The medical note is intended as peer to peer communication and may appear blunt or direct. It is written in medical language and may contain abbreviations or verbiage that are unfamiliar.      Sb Campbell MD

## 2025-04-07 ENCOUNTER — APPOINTMENT (OUTPATIENT)
Dept: GENERAL RADIOLOGY | Facility: HOSPITAL | Age: 60
End: 2025-04-07
Payer: COMMERCIAL

## 2025-04-07 ENCOUNTER — ANESTHESIA (OUTPATIENT)
Dept: CARDIAC SURGERY | Facility: HOSPITAL | Age: 60
End: 2025-04-07
Payer: COMMERCIAL

## 2025-04-07 ENCOUNTER — APPOINTMENT (OUTPATIENT)
Dept: GENERAL RADIOLOGY | Facility: HOSPITAL | Age: 60
End: 2025-04-07
Attending: EMERGENCY MEDICINE
Payer: COMMERCIAL

## 2025-04-07 ENCOUNTER — ANESTHESIA EVENT (OUTPATIENT)
Dept: CARDIAC SURGERY | Facility: HOSPITAL | Age: 60
End: 2025-04-07
Payer: COMMERCIAL

## 2025-04-07 LAB
ALBUMIN SERPL-MCNC: 3.5 G/DL (ref 3.2–4.8)
ALBUMIN SERPL-MCNC: 3.7 G/DL (ref 3.2–4.8)
ALBUMIN/GLOB SERPL: 1.3 {RATIO} (ref 1–2)
ALBUMIN/GLOB SERPL: 1.4 {RATIO} (ref 1–2)
ALP LIVER SERPL-CCNC: 58 U/L
ALP LIVER SERPL-CCNC: 67 U/L
ALT SERPL-CCNC: <7 U/L
ALT SERPL-CCNC: <7 U/L
ANION GAP SERPL CALC-SCNC: 10 MMOL/L (ref 0–18)
ANION GAP SERPL CALC-SCNC: 7 MMOL/L (ref 0–18)
APTT PPP: 30 SECONDS (ref 23–36)
APTT PPP: 41.9 SECONDS (ref 23–36)
APTT PPP: 45.8 SECONDS (ref 23–36)
AST SERPL-CCNC: 12 U/L (ref ?–34)
AST SERPL-CCNC: 15 U/L (ref ?–34)
BASE EXCESS BLD CALC-SCNC: -1 MMOL/L
BASOPHILS # BLD AUTO: 0.04 X10(3) UL (ref 0–0.2)
BASOPHILS NFR BLD AUTO: 0.2 %
BASOPHILS NFR BLD AUTO: 0.3 %
BASOPHILS NFR BLD AUTO: 0.3 %
BILIRUB SERPL-MCNC: 0.5 MG/DL (ref 0.2–1.1)
BILIRUB SERPL-MCNC: 0.6 MG/DL (ref 0.2–1.1)
BUN BLD-MCNC: 9 MG/DL (ref 9–23)
BUN BLD-MCNC: 9 MG/DL (ref 9–23)
CA-I BLD-SCNC: 1.14 MMOL/L (ref 1.12–1.32)
CALCIUM BLD-MCNC: 8.8 MG/DL (ref 8.7–10.6)
CALCIUM BLD-MCNC: 8.9 MG/DL (ref 8.7–10.6)
CHLORIDE SERPL-SCNC: 102 MMOL/L (ref 98–112)
CHLORIDE SERPL-SCNC: 102 MMOL/L (ref 98–112)
CO2 BLD-SCNC: 24 MMOL/L (ref 22–32)
CO2 SERPL-SCNC: 23 MMOL/L (ref 21–32)
CO2 SERPL-SCNC: 29 MMOL/L (ref 21–32)
CREAT BLD-MCNC: 1.07 MG/DL
CREAT BLD-MCNC: 1.08 MG/DL
EGFRCR SERPLBLD CKD-EPI 2021: 58 ML/MIN/1.73M2 (ref 60–?)
EGFRCR SERPLBLD CKD-EPI 2021: 59 ML/MIN/1.73M2 (ref 60–?)
EOSINOPHIL # BLD AUTO: 0.05 X10(3) UL (ref 0–0.7)
EOSINOPHIL # BLD AUTO: 0.16 X10(3) UL (ref 0–0.7)
EOSINOPHIL # BLD AUTO: 0.22 X10(3) UL (ref 0–0.7)
EOSINOPHIL NFR BLD AUTO: 0.3 %
EOSINOPHIL NFR BLD AUTO: 1.2 %
EOSINOPHIL NFR BLD AUTO: 1.6 %
ERYTHROCYTE [DISTWIDTH] IN BLOOD BY AUTOMATED COUNT: 16.3 %
ERYTHROCYTE [DISTWIDTH] IN BLOOD BY AUTOMATED COUNT: 16.3 %
ERYTHROCYTE [DISTWIDTH] IN BLOOD BY AUTOMATED COUNT: 16.4 %
GLOBULIN PLAS-MCNC: 2.5 G/DL (ref 2–3.5)
GLOBULIN PLAS-MCNC: 2.9 G/DL (ref 2–3.5)
GLUCOSE BLD-MCNC: 109 MG/DL (ref 70–99)
GLUCOSE BLD-MCNC: 124 MG/DL (ref 70–99)
GLUCOSE BLD-MCNC: 98 MG/DL (ref 70–99)
HCO3 BLD-SCNC: 23.5 MEQ/L
HCT VFR BLD AUTO: 25.1 %
HCT VFR BLD AUTO: 25.4 %
HCT VFR BLD AUTO: 30 %
HCT VFR BLD CALC: 25 %
HGB BLD-MCNC: 8 G/DL
HGB BLD-MCNC: 8 G/DL
HGB BLD-MCNC: 9.7 G/DL
IMM GRANULOCYTES # BLD AUTO: 0.09 X10(3) UL (ref 0–1)
IMM GRANULOCYTES # BLD AUTO: 0.11 X10(3) UL (ref 0–1)
IMM GRANULOCYTES # BLD AUTO: 0.14 X10(3) UL (ref 0–1)
IMM GRANULOCYTES NFR BLD: 0.6 %
IMM GRANULOCYTES NFR BLD: 0.8 %
IMM GRANULOCYTES NFR BLD: 0.8 %
INR BLD: 1.19 (ref 0.8–1.2)
LYMPHOCYTES # BLD AUTO: 0.75 X10(3) UL (ref 1–4)
LYMPHOCYTES # BLD AUTO: 1.1 X10(3) UL (ref 1–4)
LYMPHOCYTES # BLD AUTO: 1.27 X10(3) UL (ref 1–4)
LYMPHOCYTES NFR BLD AUTO: 4.4 %
LYMPHOCYTES NFR BLD AUTO: 7.9 %
LYMPHOCYTES NFR BLD AUTO: 9 %
MAGNESIUM SERPL-MCNC: 2 MG/DL (ref 1.6–2.6)
MCH RBC QN AUTO: 25.1 PG (ref 26–34)
MCH RBC QN AUTO: 25.3 PG (ref 26–34)
MCH RBC QN AUTO: 25.6 PG (ref 26–34)
MCHC RBC AUTO-ENTMCNC: 31.5 G/DL (ref 31–37)
MCHC RBC AUTO-ENTMCNC: 31.9 G/DL (ref 31–37)
MCHC RBC AUTO-ENTMCNC: 32.3 G/DL (ref 31–37)
MCV RBC AUTO: 79.2 FL
MCV RBC AUTO: 79.4 FL
MCV RBC AUTO: 79.6 FL
MONOCYTES # BLD AUTO: 1.34 X10(3) UL (ref 0.1–1)
MONOCYTES # BLD AUTO: 1.5 X10(3) UL (ref 0.1–1)
MONOCYTES # BLD AUTO: 1.58 X10(3) UL (ref 0.1–1)
MONOCYTES NFR BLD AUTO: 10.8 %
MONOCYTES NFR BLD AUTO: 11.2 %
MONOCYTES NFR BLD AUTO: 7.8 %
NEUTROPHILS # BLD AUTO: 10.91 X10 (3) UL (ref 1.5–7.7)
NEUTROPHILS # BLD AUTO: 10.91 X10(3) UL (ref 1.5–7.7)
NEUTROPHILS # BLD AUTO: 10.94 X10 (3) UL (ref 1.5–7.7)
NEUTROPHILS # BLD AUTO: 10.94 X10(3) UL (ref 1.5–7.7)
NEUTROPHILS # BLD AUTO: 14.76 X10 (3) UL (ref 1.5–7.7)
NEUTROPHILS # BLD AUTO: 14.76 X10(3) UL (ref 1.5–7.7)
NEUTROPHILS NFR BLD AUTO: 77.3 %
NEUTROPHILS NFR BLD AUTO: 79 %
NEUTROPHILS NFR BLD AUTO: 86.5 %
OSMOLALITY SERPL CALC.SUM OF ELEC: 280 MOSM/KG (ref 275–295)
OSMOLALITY SERPL CALC.SUM OF ELEC: 285 MOSM/KG (ref 275–295)
PCO2 BLD: 34.6 MMHG
PH BLD: 7.44 [PH]
PHOSPHATE SERPL-MCNC: 2.4 MG/DL (ref 2.4–5.1)
PLATELET # BLD AUTO: 172 10(3)UL (ref 150–450)
PLATELET # BLD AUTO: 172 10(3)UL (ref 150–450)
PLATELET # BLD AUTO: 176 10(3)UL (ref 150–450)
PLATELET # BLD AUTO: 179 10(3)UL (ref 150–450)
PO2 BLD: 249 MMHG
POTASSIUM BLD-SCNC: 3.8 MMOL/L (ref 3.6–5.1)
POTASSIUM SERPL-SCNC: 3.8 MMOL/L (ref 3.5–5.1)
PROT SERPL-MCNC: 6 G/DL (ref 5.7–8.2)
PROT SERPL-MCNC: 6.6 G/DL (ref 5.7–8.2)
PROTHROMBIN TIME: 15.2 SECONDS (ref 11.6–14.8)
RBC # BLD AUTO: 3.16 X10(6)UL
RBC # BLD AUTO: 3.19 X10(6)UL
RBC # BLD AUTO: 3.79 X10(6)UL
SAO2 % BLD: 100 %
SODIUM BLD-SCNC: 133 MMOL/L (ref 136–145)
SODIUM SERPL-SCNC: 135 MMOL/L (ref 136–145)
SODIUM SERPL-SCNC: 138 MMOL/L (ref 136–145)
WBC # BLD AUTO: 13.9 X10(3) UL (ref 4–11)
WBC # BLD AUTO: 14.1 X10(3) UL (ref 4–11)
WBC # BLD AUTO: 17.1 X10(3) UL (ref 4–11)

## 2025-04-07 PROCEDURE — B340ZZ3 ULTRASONOGRAPHY OF THORACIC AORTA, INTRAVASCULAR: ICD-10-PCS | Performed by: SURGERY

## 2025-04-07 PROCEDURE — 99233 SBSQ HOSP IP/OBS HIGH 50: CPT | Performed by: INTERNAL MEDICINE

## 2025-04-07 PROCEDURE — 71045 X-RAY EXAM CHEST 1 VIEW: CPT

## 2025-04-07 PROCEDURE — 047H3DZ DILATION OF RIGHT EXTERNAL ILIAC ARTERY WITH INTRALUMINAL DEVICE, PERCUTANEOUS APPROACH: ICD-10-PCS | Performed by: SURGERY

## 2025-04-07 PROCEDURE — 71045 X-RAY EXAM CHEST 1 VIEW: CPT | Performed by: EMERGENCY MEDICINE

## 2025-04-07 PROCEDURE — 99233 SBSQ HOSP IP/OBS HIGH 50: CPT | Performed by: EMERGENCY MEDICINE

## 2025-04-07 PROCEDURE — 02VX3DZ RESTRICTION OF THORACIC AORTA, ASCENDING/ARCH WITH INTRALUMINAL DEVICE, PERCUTANEOUS APPROACH: ICD-10-PCS | Performed by: SURGERY

## 2025-04-07 PROCEDURE — B44HZZ3 ULTRASONOGRAPHY OF BILATERAL LOWER EXTREMITY ARTERIES, INTRAVASCULAR: ICD-10-PCS | Performed by: SURGERY

## 2025-04-07 DEVICE — IMPLANTABLE DEVICE: Type: IMPLANTABLE DEVICE | Site: ABDOMEN | Status: FUNCTIONAL

## 2025-04-07 RX ORDER — PROTAMINE SULFATE 10 MG/ML
INJECTION, SOLUTION INTRAVENOUS AS NEEDED
Status: DISCONTINUED | OUTPATIENT
Start: 2025-04-07 | End: 2025-04-07 | Stop reason: SURG

## 2025-04-07 RX ORDER — DEXMEDETOMIDINE HYDROCHLORIDE 4 UG/ML
INJECTION, SOLUTION INTRAVENOUS CONTINUOUS
Status: DISCONTINUED | OUTPATIENT
Start: 2025-04-07 | End: 2025-04-08

## 2025-04-07 RX ORDER — HYDROCODONE BITARTRATE AND ACETAMINOPHEN 5; 325 MG/1; MG/1
2 TABLET ORAL ONCE AS NEEDED
Status: ACTIVE | OUTPATIENT
Start: 2025-04-07 | End: 2025-04-07

## 2025-04-07 RX ORDER — CLOPIDOGREL BISULFATE 75 MG/1
75 TABLET ORAL DAILY
Status: DISCONTINUED | OUTPATIENT
Start: 2025-04-08 | End: 2025-04-15

## 2025-04-07 RX ORDER — ENOXAPARIN SODIUM 100 MG/ML
40 INJECTION SUBCUTANEOUS DAILY
Status: DISCONTINUED | OUTPATIENT
Start: 2025-04-08 | End: 2025-04-15

## 2025-04-07 RX ORDER — DIPHENHYDRAMINE HYDROCHLORIDE 50 MG/ML
12.5 INJECTION, SOLUTION INTRAMUSCULAR; INTRAVENOUS AS NEEDED
Status: ACTIVE | OUTPATIENT
Start: 2025-04-07 | End: 2025-04-08

## 2025-04-07 RX ORDER — IPRATROPIUM BROMIDE AND ALBUTEROL SULFATE 2.5; .5 MG/3ML; MG/3ML
SOLUTION RESPIRATORY (INHALATION)
Status: COMPLETED
Start: 2025-04-07 | End: 2025-04-07

## 2025-04-07 RX ORDER — ONDANSETRON 2 MG/ML
INJECTION INTRAMUSCULAR; INTRAVENOUS AS NEEDED
Status: DISCONTINUED | OUTPATIENT
Start: 2025-04-07 | End: 2025-04-07 | Stop reason: SURG

## 2025-04-07 RX ORDER — POTASSIUM CHLORIDE 14.9 MG/ML
20 INJECTION INTRAVENOUS ONCE
Status: COMPLETED | OUTPATIENT
Start: 2025-04-07 | End: 2025-04-07

## 2025-04-07 RX ORDER — LABETALOL HYDROCHLORIDE 5 MG/ML
INJECTION, SOLUTION INTRAVENOUS AS NEEDED
Status: DISCONTINUED | OUTPATIENT
Start: 2025-04-07 | End: 2025-04-07 | Stop reason: SURG

## 2025-04-07 RX ORDER — HYDROCODONE BITARTRATE AND ACETAMINOPHEN 5; 325 MG/1; MG/1
1 TABLET ORAL ONCE AS NEEDED
Status: ACTIVE | OUTPATIENT
Start: 2025-04-07 | End: 2025-04-07

## 2025-04-07 RX ORDER — NALOXONE HYDROCHLORIDE 0.4 MG/ML
0.08 INJECTION, SOLUTION INTRAMUSCULAR; INTRAVENOUS; SUBCUTANEOUS AS NEEDED
Status: ACTIVE | OUTPATIENT
Start: 2025-04-07 | End: 2025-04-08

## 2025-04-07 RX ORDER — SODIUM CHLORIDE 9 MG/ML
INJECTION, SOLUTION INTRAVENOUS CONTINUOUS
Status: DISCONTINUED | OUTPATIENT
Start: 2025-04-07 | End: 2025-04-08

## 2025-04-07 RX ORDER — NITROGLYCERIN 20 MG/100ML
INJECTION INTRAVENOUS CONTINUOUS PRN
Status: DISCONTINUED | OUTPATIENT
Start: 2025-04-07 | End: 2025-04-15

## 2025-04-07 RX ORDER — ACETAMINOPHEN 325 MG/1
650 TABLET ORAL EVERY 4 HOURS PRN
Status: DISCONTINUED | OUTPATIENT
Start: 2025-04-07 | End: 2025-04-15

## 2025-04-07 RX ORDER — DEXMEDETOMIDINE HYDROCHLORIDE 4 UG/ML
INJECTION, SOLUTION INTRAVENOUS
Status: COMPLETED
Start: 2025-04-07 | End: 2025-04-07

## 2025-04-07 RX ORDER — ROCURONIUM BROMIDE 10 MG/ML
INJECTION, SOLUTION INTRAVENOUS AS NEEDED
Status: DISCONTINUED | OUTPATIENT
Start: 2025-04-07 | End: 2025-04-07 | Stop reason: SURG

## 2025-04-07 RX ORDER — LIDOCAINE HYDROCHLORIDE 10 MG/ML
INJECTION, SOLUTION EPIDURAL; INFILTRATION; INTRACAUDAL; PERINEURAL AS NEEDED
Status: DISCONTINUED | OUTPATIENT
Start: 2025-04-07 | End: 2025-04-07 | Stop reason: SURG

## 2025-04-07 RX ORDER — PROCHLORPERAZINE EDISYLATE 5 MG/ML
5 INJECTION INTRAMUSCULAR; INTRAVENOUS EVERY 8 HOURS PRN
Status: DISCONTINUED | OUTPATIENT
Start: 2025-04-08 | End: 2025-04-15

## 2025-04-07 RX ORDER — IODIXANOL 320 MG/ML
100 INJECTION, SOLUTION INTRAVASCULAR
Status: DISCONTINUED | OUTPATIENT
Start: 2025-04-07 | End: 2025-04-15

## 2025-04-07 RX ORDER — ONDANSETRON 2 MG/ML
4 INJECTION INTRAMUSCULAR; INTRAVENOUS AS NEEDED
Status: ACTIVE | OUTPATIENT
Start: 2025-04-07 | End: 2025-04-08

## 2025-04-07 RX ORDER — DEXAMETHASONE SODIUM PHOSPHATE 4 MG/ML
4 VIAL (ML) INJECTION AS NEEDED
Status: ACTIVE | OUTPATIENT
Start: 2025-04-07 | End: 2025-04-08

## 2025-04-07 RX ORDER — HYDROCODONE BITARTRATE AND ACETAMINOPHEN 5; 325 MG/1; MG/1
2 TABLET ORAL EVERY 4 HOURS PRN
Status: DISCONTINUED | OUTPATIENT
Start: 2025-04-07 | End: 2025-04-15

## 2025-04-07 RX ORDER — SODIUM CHLORIDE, SODIUM LACTATE, POTASSIUM CHLORIDE, CALCIUM CHLORIDE 600; 310; 30; 20 MG/100ML; MG/100ML; MG/100ML; MG/100ML
INJECTION, SOLUTION INTRAVENOUS CONTINUOUS PRN
Status: DISCONTINUED | OUTPATIENT
Start: 2025-04-07 | End: 2025-04-07 | Stop reason: SURG

## 2025-04-07 RX ORDER — METOPROLOL TARTRATE 1 MG/ML
2.5 INJECTION, SOLUTION INTRAVENOUS
Status: DISCONTINUED | OUTPATIENT
Start: 2025-04-07 | End: 2025-04-15

## 2025-04-07 RX ORDER — PHENYLEPHRINE HCL IN 0.9% NACL 50MG/250ML
PLASTIC BAG, INJECTION (ML) INTRAVENOUS CONTINUOUS PRN
Status: DISCONTINUED | OUTPATIENT
Start: 2025-04-07 | End: 2025-04-09 | Stop reason: HOSPADM

## 2025-04-07 RX ORDER — SODIUM CHLORIDE 9 MG/ML
INJECTION, SOLUTION INTRAVENOUS ONCE
Status: COMPLETED | OUTPATIENT
Start: 2025-04-07 | End: 2025-04-07

## 2025-04-07 RX ORDER — DEXAMETHASONE SODIUM PHOSPHATE 4 MG/ML
VIAL (ML) INJECTION AS NEEDED
Status: DISCONTINUED | OUTPATIENT
Start: 2025-04-07 | End: 2025-04-07 | Stop reason: SURG

## 2025-04-07 RX ORDER — ONDANSETRON 2 MG/ML
4 INJECTION INTRAMUSCULAR; INTRAVENOUS EVERY 6 HOURS PRN
Status: DISCONTINUED | OUTPATIENT
Start: 2025-04-07 | End: 2025-04-15

## 2025-04-07 RX ORDER — HYDROCODONE BITARTRATE AND ACETAMINOPHEN 5; 325 MG/1; MG/1
1 TABLET ORAL EVERY 4 HOURS PRN
Status: DISCONTINUED | OUTPATIENT
Start: 2025-04-07 | End: 2025-04-15

## 2025-04-07 RX ORDER — HYDRALAZINE HYDROCHLORIDE 20 MG/ML
INJECTION INTRAMUSCULAR; INTRAVENOUS AS NEEDED
Status: DISCONTINUED | OUTPATIENT
Start: 2025-04-07 | End: 2025-04-07 | Stop reason: SURG

## 2025-04-07 RX ORDER — HEPARIN SODIUM 1000 [USP'U]/ML
INJECTION, SOLUTION INTRAVENOUS; SUBCUTANEOUS AS NEEDED
Status: DISCONTINUED | OUTPATIENT
Start: 2025-04-07 | End: 2025-04-07 | Stop reason: SURG

## 2025-04-07 RX ORDER — METOCLOPRAMIDE HYDROCHLORIDE 5 MG/ML
5 INJECTION INTRAMUSCULAR; INTRAVENOUS AS NEEDED
Status: ACTIVE | OUTPATIENT
Start: 2025-04-07 | End: 2025-04-08

## 2025-04-07 RX ORDER — HYDROMORPHONE HYDROCHLORIDE 1 MG/ML
0.4 INJECTION, SOLUTION INTRAMUSCULAR; INTRAVENOUS; SUBCUTANEOUS EVERY 5 MIN PRN
Status: ACTIVE | OUTPATIENT
Start: 2025-04-07 | End: 2025-04-07

## 2025-04-07 RX ADMIN — LABETALOL HYDROCHLORIDE 10 MG: 5 INJECTION, SOLUTION INTRAVENOUS at 19:00:00

## 2025-04-07 RX ADMIN — HEPARIN SODIUM 2000 UNITS: 1000 INJECTION, SOLUTION INTRAVENOUS; SUBCUTANEOUS at 18:30:00

## 2025-04-07 RX ADMIN — LIDOCAINE HYDROCHLORIDE 10 ML: 10 INJECTION, SOLUTION EPIDURAL; INFILTRATION; INTRACAUDAL; PERINEURAL at 17:18:00

## 2025-04-07 RX ADMIN — HEPARIN SODIUM 5000 UNITS: 1000 INJECTION, SOLUTION INTRAVENOUS; SUBCUTANEOUS at 17:47:00

## 2025-04-07 RX ADMIN — DEXAMETHASONE SODIUM PHOSPHATE 4 MG: 4 MG/ML VIAL (ML) INJECTION at 17:30:00

## 2025-04-07 RX ADMIN — ROCURONIUM BROMIDE 50 MG: 10 INJECTION, SOLUTION INTRAVENOUS at 17:28:00

## 2025-04-07 RX ADMIN — ROCURONIUM BROMIDE 30 MG: 10 INJECTION, SOLUTION INTRAVENOUS at 18:26:00

## 2025-04-07 RX ADMIN — PROTAMINE SULFATE 50 MG: 10 INJECTION, SOLUTION INTRAVENOUS at 18:48:00

## 2025-04-07 RX ADMIN — HYDRALAZINE HYDROCHLORIDE 10 MG: 20 INJECTION INTRAMUSCULAR; INTRAVENOUS at 18:54:00

## 2025-04-07 RX ADMIN — HEPARIN SODIUM 1000 UNITS: 1000 INJECTION, SOLUTION INTRAVENOUS; SUBCUTANEOUS at 18:00:00

## 2025-04-07 RX ADMIN — ONDANSETRON 4 MG: 2 INJECTION INTRAMUSCULAR; INTRAVENOUS at 18:52:00

## 2025-04-07 RX ADMIN — SODIUM CHLORIDE, SODIUM LACTATE, POTASSIUM CHLORIDE, CALCIUM CHLORIDE: 600; 310; 30; 20 INJECTION, SOLUTION INTRAVENOUS at 17:07:00

## 2025-04-07 NOTE — OPERATIVE REPORT
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  Thoracoabdominal aortic aneurysm with Dissection  3.   Severe left external iliac artery stenosis  4.  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  Thoracoabdominal aortic aneurysm with Dissection  3.   Severe left external iliac artery stenosis  4.  Severe left renal artery stenosis     Procedure Performed:   Left carotid transposition to the left subclavian artery  Ultrasound and access of the bilateral common femoral arteries  Ultrasound-guided access of the right radial artery  Thoracic and abdominal aortogram with pelvic angiogram with radiologic supervision and interpretation  Selective catheterization third order branch of the aorta (left brachial artery  Left upper extremity arteriogram with radiologic supervision and interpretation  Thoracic endovascular aortic repair with coverage of the subclavian artery with placement of modified [place] TBE 40x150 with 40x150 [place] CTAG, [id number] to celiac.  Angioplasty and stenting of the left subclavian artery with placement of 11x39 VBX  Angioplasty and stenting of the innominate artery with placement of [id number] [place] side branch endoprosthesis.  Angioplasty and stenting of infrarenal aortic dissection with 36x180 Zenith dissection cook baremetal stent.  Angioplasty and stenting of left renal artery with 7x22 iCast flaired with 9mm balloon.  Angioplasty and stenting of the left common iliac artery with 14 mm protégé stent  Placed and stenting of the right common iliac artery with 14 mm protégé stent  14. Angioplasty and stenting of left external iliac artery 10 mm protégé stents postdilated with 10 mm balloon.  15. Intravascular ultrasound of  left external iliac, left common iliac, right external iliac, right common iliac and aorta with radiologic supervision and interpretation.  16. Closure of bilateral  groin with Pro-glide Perclose suture (26 Ma[place] right, 8 Maori left).     Co-Surgeons:  MD Frank Roth MD     Due to the complexity of the operation I requested Dr. Palm to participate as cosurgeon for this operation given the severe medical and surgical complexity.     Surgical Findings: Carotid transposed to the left subclavian.  Endovascular repair performed in the arch with successful exclusion of the aneurysm.   Wide patency of the great vessels with no endoleak.  Neuromonitoring intact with no deficits throughout the entirety of the procedure.  Thermal septotomy performed distally followed by a fenestrated endovascular aortic repair.  Subsequent embolization of SMA bypass performed and repair of left internal iliac dissociated components.  Palpable pulses in the bilateral lower extremities at completion.  Extubated and neurologically intact at completion.     Specimen: None     Estimated Blood Loss: Blood Output: 800 mL       Drains: 10 mm neck drain      Transfusions: 4 unit packed red blood cells     Complications: none.       Disposition: Awoke from general anesthesia was extubated and taken to the ICU neurovascularly intact.     Indications for procedure: This is a 59 year-old female who was found to have a thoracic aortic aneurysm extending and beginning at the arch with a thoracoabdominal aortic dissection.  She had previous She was also noted to have progressive aneurysmal degeneration in the thoracic aorta to 6.5 cm with rapid growth from her previous operation in November.  Her aneurysm met size criteria for repair.  She had no suitable landing zone in the zone 1 or zone 2 segment.  She had previous long hospitalization requiring trach and PEG.  She is also morbidly obese. She was evaluated  and given the extent of the operation and the redo nature, was deemed to be high risk for open surgery.    We thus recommended a fenestrated repair of the arch with carotid  transposition,. He was informed that there was no stent commercially available and that he would require a physician modified endograft.  I discussed the risk and benefits of the procedure.  Informed consent was directly obtained.     On the morning of 04/02 the patient was brought into the operating room and placed in supine position.  General anesthesia was induced without any issues.  An arterial line, Webb catheter and central line were placed.  Antibiotics were administered.  The patient was subsequently prepped and draped in the usual sterile fashion.  Timeout was performed.     During the anesthesia portion, we modified a [place] TBE and added a fenestration distal to the side branch.  A microwire was used to deion the fenestration and this was reinforced with 4-0 Ethibond suture the device was then ready for use.       With the use of a 10 blade an 8 cm incision was made in the supraclavicular portion of the base of the neck.  Dissection was carried down to electrocautery through the subcutaneous tissues.  Retractors were placed.  The platysma was transected.  The clavicular head of the sternocleidomastoid was transected with electrocautery.  We first identified the jugular vein and mobilized this laterally to identify the common carotid.  This was circumferentially dissected down to the base under the sternum in order to ensure enough length for transposition.  The vein was then mobilized medially.  The fat pad was mobilized laterally and the anterior skin was identified with identification and preservation of the phrenic nerve.  This was transected with electrocautery.  The subclavian artery was identified and circumferentially dissected and encircled with Vesseloops with preservation of all branches.  The patient was systemically heparinized.  The common carotid was clamped proximally distally and transected with Kemp scissors and tunneled in a retrojugular fashion.  The stump was ligated with the use of  4-0 Prolene suture in running 2 layered fashion.     An 11 blade was then used to perform an arteriotomy after clamps were placed proximally and distally on the subclavian.  Amaral scissors were used to carry the arteriotomy.  The artery was trimmed to appropriate length and spatulated and then with 5-0 Prolene suture and end-to-side anastomosis was performed in running fashion. I performed the superior portion and Dr. Palm performed the inferior portion.  Prior to cinching the sutures the artery was forward and backbled and flushed with heparinized saline and then the sutures were then cinched and tied thereby completing the reconstruction.  The artery was forward bled through the arm and then once this was completed flow was returned to the carotid.  Neuromonitoring was found to be stable throughout the entirety of this segment.  At this point we opted to proceed with the endovascular portion of the procedure.      During this time Dr. Palm accessed the right common femoral artery with a micropuncture and advanced a microwire and exchanged for a micro sheath.  A J-wire was then put in place followed by placement of a 6 Austrian sheath.  He then deployed 2 Pro-glide Perclose sutures in the 10 and 2 o'clock position in each groin with subsequent placement of 8 Austrian sheath.  Dr. Palm performed this in the right. I performed this on the left.        The wire was then advanced into the aorta bilaterally.  Intravascular ultrasound was advanced from the right external iliac, right common iliac into the aorta to confirm placement into the true lumen.  Dr. Palm performed this on the right. I performed this on the left with confirmation through the left external iliac, left common iliac and aorta. There was severe true lumen compression.  We used the intravascular sound to verify perfusion imaging.     Dr. Palm then exchanged for a 26 Austrian dry seal sheath in the right groin. I then accessed the right  radial artery with advancement of a microwire and exchanged for a 6 Congolese slender sheath.  A Glidewire was then used to traverse the upper extremity and was advanced across the arch into the dry seal graft by obtaining through and through access.  I then exchanged for a [employer]t wire.     The graft was advanced into the descending thoracic aorta across the arch with care to manipulate the wire to follow the outer curve thereby aligning the branches with the ostia.   Dr. Palm then utilized the preloaded catheter through the fenestration and used this to engage it and select the left subclavian artery and the wire was advanced into the brachial artery and the catheter was advanced and confirmed with contrast injection via left upper extremity arteriogram to be in appropriate location.  Dr. Palm then placed an Amplatz wire.     Dr. Palm then deployed the graft in the ascending aorta, aortic arch as well as the descending thoracic aorta without any issue.  I was then able to advance a 5 Congolese shuttle sheath from the right arm through the branch.     I then advanced the [place] 20 mm sidebranch over the through wire through the portal into the innominate artery.  I then performed a right upper extremity arteriogram to identify the subclavian and carotid bifurcation off of the innominate. I then deployed the innominate artery stent performing angioplasty and stenting without any issues.  The angioplasty was performed with a molding and occluding aortic balloon.  Dr. Palm then exchanged for an 8 Congolese sheath through the right groin access through the fenestration over the Amplatz wire.  Dr. Palm then performed left upper extremity arteriogram that revealed the location of the vertebral artery and carotid. Dr. Palm then deployed an 11x39 VBX without issue in the left subclavian artery.  Then flared the proximal segment with an overinflated 12 mm balloon.  Repeat angiogram revealed wide patency of  the stent with preservation of flow to the subclavian and carotid on the right and on the left with no leak through this.       Dr. Palm then exchanged for a 40 mm C tag and extended this distal to the fenestration into the descending thoracic aorta. I then deployed a [id number] mid length graft into the the descending thoracic aorta to the level of the celiac artery.  I deployed this without any issue.   I then deployed the bare-metal dissection graft with appropriate overlap all the way to the distal infrarenal aorta.  I then advanced the balloon and I inflated this to rupture the septum extending from the descending thoracic down to the through the infrarenal aorta.     I then selected the left renal artery with the  and glide wire and confirmed cannulation with quickcross catheter with contrast injection.  Pérez wire was put in place.  Then deployed a 7 x 22 iCast stent without any issues and flared the proximal segment with 9 mm balloon.  Repeat angiogram revealed wide patency with no further stenosis and excellent flow to the kidney.     We then exchanged for 14 mm protégé stents and performed angioplasty and stenting of the bilateral common iliac arteries in kissing fashion.  Dr. Palm performed this on the left and I performed this on the right.  Dr. Palm then deployed an additional 10 mm protégé stent in the left external iliac artery without any issues and postdilated the proximal and distal segments with 10 mm balloon.  Dr. Palm performed this on the left and he performed this on the right.     A thoracic aortogram and abdominal aortogram and pelvic angiogram were performed with the revealed wide patency of the stents with preservation of flow to the great vessels and sealing of the aneurysm with no endoleak with preservation of flow to all visceral vessels.  Intravascular ultrasound from each end confirmed dramatic improvement of flow and excellent graft expansion.  At this point  we are satisfied and opted to terminate the procedure.  All catheters were withdrawn.  He then cinched the Pro-glide Perclose sutures in the bilateral groins which were cinched locked and cut thereby closing the arteriotomy site. A TR band was placed in the right radial access after removal of the sheath.     Topical hemostatic agents were placed.  Protamine was administered.  Upon completion there was evidence of excellent hemostasis and at this point we proceeded with closure.  I closed the neck incision with the use of 3-0 Vicryl suture in interrupted multilayer fashion.  The skin was closed with 4 Monocryl sutures in subcuticular fashion with placement of Dermabond and sterile dressings.  A 10 mm Chang-Dupree drain was placed into the neck to monitor for chyle leak.     The patient awoke from general anesthesia was extubated neurologically intact and taken to the ICU in stable condition.  All counts were correct at the end of the case. She was confirmed to have palpable pulses in the upper and lower extremities as well and was found to be neurovascularly intact.

## 2025-04-07 NOTE — PHYSICAL THERAPY NOTE
PHYSICAL THERAPY TREATMENT NOTE - INPATIENT    Room Number: 474/474-A     Session: 1     Number of Visits to Meet Established Goals: 5    Presenting Problem: Endovascular repair of aortic arch 4/2  Co-Morbidities : CKD, previous aneurysm repair, DM, HTN, HL, obesity    History related to current admission: Patient is a 59 year old female admitted on 3/30/2025 from home for several week c/o of chest and subscapular pain.  Pt diagnosed with aortic arch aneurysm.  Pt is now s/p LEFT CAROTID TO SUBCLAVIAN TRANSPOSITION, ENDOVASCULAR ARCH REPAIR. STENTING OF THE LEFT RENAL, LEFT ILIAC - per chart on 4/2     HOME SITUATION  Type of Home: [facility]  Home Layout: One level  Stairs to Enter : 0        Stairs to Bedroom: 0         Lives With: Alone    Drives: Yes           Prior Level of [place]: Pt is typically independent w/ adl's, gait w/ cane, drives and is currently on short term disability w/ [employer].    PHYSICAL THERAPY ASSESSMENT   Patient demonstrates fair progress this session, goals  remain in progress.      Patient is requiring minimal assist as a result of the following impairments: decreased functional strength, decreased endurance/aerobic capacity, pain, impaired standing balance, decreased muscular endurance, medical status, limited L elbow and shoulder active ROM, and increased O2 needs from baseline.     Patient continues to function below baseline with bed mobility, transfers, gait, and performing household tasks.  Next session anticipate patient to progress bed mobility, transfers, and gait.  Physical Therapy will continue to follow patient for duration of hospitalization.    Patient continues to benefit from continued skilled PT services: to facilitate return to prior level of function as patient demonstrates high motivation with excellent tolerance to an intensive therapy program .  Pt w/ prolonged hospitalization and second procedure planned today.  Pt w/ new L shoulder and biceps weakness since this  admission limiting some of her functional activity.  Pt relying on rw for gait which is not her baseline. Pt lives alone and had been independent prior to admission (used a cane).  Pt is not near her baseline level at this time.    PLAN DURING HOSPITALIZATION  Nursing Mobility Recommendation : 1 Assist  PT Device Recommendation: Rolling walker  PT Treatment Plan: Bed mobility, Endurance, Patient education, Family education, Gait training, Range of motion, Transfer training, Balance training  Frequency (Obs): 3-5x/week     CURRENT GOALS     Goal #1 Patient is able to demonstrate supine - sit EOB @ level: modified independent      Goal #2 Patient is able to demonstrate transfers EOB to/from [facility] at assistance level: modified independent      Goal #3 Patient is able to ambulate 250 feet with assist device:  least restrictive device  at assistance level: supervision      Goal #4     Goal #5     Goal #6     Goal Comments: Goals established on 4/3/2025  4/7/2025 all goals ongoing    SUBJECTIVE  \"I can't wipe myself - I use my l hand and my arm is too weak.  My right arm won't work either because the IV is too painful.\"    OBJECTIVE  Precautions: Drain(s)    WEIGHT BEARING RESTRICTION     PAIN ASSESSMENT   Rating: Unable to rate  Location: R ue at IV location  Management Techniques: Activity promotion, Repositioning    BALANCE                                                                                                                       Static Sitting: Good  Dynamic Sitting: Fair -           Static Standing: Poor +  Dynamic Standing: Poor +    ACTIVITY TOLERANCE  Pulse: 89  Heart Rate Source: Monitor     BP: 125/67  BP Location: Left arm  BP Method: Automatic  Patient Position: Sitting    O2 WALK  Oxygen Therapy  SPO2% on Oxygen at Rest: 99  At rest oxygen flow (liters per minute): 3  SPO2% Ambulation on Oxygen: 95  Ambulation oxygen flow (liters per minute): 3    AM-PAC '6-Clicks' INPATIENT SHORT FORM - BASIC  MOBILITY  How much difficulty does the patient currently have...  Patient Difficulty: Turning over in bed (including adjusting bedclothes, sheets and blankets)?: A Little   Patient Difficulty: Sitting down on and standing up from a chair with arms (e.g., wheelchair, bedside commode, etc.): A Little   Patient Difficulty: Moving from lying on back to sitting on the side of the bed?: A Little   How much help from another person does the patient currently need...   Help from Another: Moving to and from a bed to a chair (including a wheelchair)?: A Little   Help from Another: Need to walk in hospital room?: A Little   Help from Another: Climbing 3-5 steps with a railing?: A Lot     AM-PAC Score:  Raw Score: 17   Approx Degree of Impairment: 50.57%   Standardized Score (AM-PAC Scale): 42.13   CMS Modifier (G-Code): CK    FUNCTIONAL ABILITY STATUS  Gait Assessment   Functional Mobility/Gait Assessment  Gait Assistance: Minimum assistance  Distance (ft): 75  Assistive Device: Rolling walker  Pattern: R Foot flat, L Foot flat (Mild foward flexion at hips)    Skilled Therapy Provided    Bed Mobility:  Rolling: NT   Supine<>Sit: NT   Sit<>Supine: NT     Transfer Mobility:  Sit<>Stand: Performed 2x over course of session.  Required min a of 1 from bedside chair, but CGA from elevated commode over toilet. Pt engaging L ue in transitions, but at times has mild motor planning difficulty and takes a couple of attempts to grasp the handle.  Stand<>Sit: CGA, cues for safety technique.   Gait: Pt completed gait 75'x1 w/ rolling walker and min a of 1.  Pt on 3L of O2 and maintained sats well during gait.     Therapist's Comments: Pt w/ ongoing L ue weakness particularly w/ biceps and shoulder elevation - limiting her functional activity.  Pt scheduled for secondary procedure later today.      THERAPEUTIC EXERCISES  Lower Extremity Alternating marching  Ankle pumps  LAQ     Upper Extremity      Position Sitting     Repetitions   10    Sets   1     Patient End of Session: Up in chair, Needs met, Call light within reach, RN aware of session/findings, All patient questions and concerns addressed (Leah Weems aware of treatment session)    PT Session Time: 32 minutes  Gait Trainin minutes  Therapeutic Activity: 7 minutes  Therapeutic Exercise: 9 minutes   Neuromuscular Re-education: 0 minutes

## 2025-04-07 NOTE — PROGRESS NOTES
[facility]   part of [place] Hospitalist Progress Note     Alisha Wilde Patient Status:  Inpatient    10/25/1965 MRN [id number]   Location [facility] 4SW-A Attending Jade Cameron MD   Hosp Day # 8 PCP Bridger Avendano MD     Chief Complaint: back pain, chest pain     Subjective:     No acute complaints. Plan for OR today afternoon.     Objective:      Vital signs:  Temp:  [98.7 °F (37.1 °C)-102 °F (38.9 °C)] 99.2 °F (37.3 °C)  Pulse:  [] 93  Resp:  [16-34] 28  BP: (112-156)/(64-80) 146/80  SpO2:  [90 %-100 %] 90 %    Physical Exam:    General: NAD  HEENT: Oral mucosa moist  Heart: Normal S1 and S2. Regular rhythm  Lungs: CTAB  Abdomen: Soft, non-tender  Extremities: no edema  Neuro: NO facial asymmetry. Tongue protrudes midline. LUE abduction weakness     Diagnostic Data:    Labs:  Recent Labs   Lab 25  1739 25  0235 25  0411 25  0644 25  0838 25  0341 25  0612 25  1203   WBC 5.1   < > 13.6* 17.0* 17.9* 16.4* 13.9* 14.1*   HGB 9.8*   < > 12.1 11.4* 10.0* 8.9* 8.0* 8.0*   MCV 79.4*   < > 77.2* 77.8* 77.4* 78.1* 79.4* 79.6*   .0   < > 198.0 188.0 146.0* 153.0  153.0 172.0  172.0 179.0   INR 1.14  --  1.18  --   --   --   --   --     < > = values in this interval not displayed.       Recent Labs   Lab 25  0644 25  0838 25  0341 25  0612   * 101* 94 109*   BUN 13 11 9 9   CREATSERUM 1.41* 1.13* 1.07* 1.08*   CA 8.9 9.1 8.9 8.8   ALB 3.7  --  3.6 3.5    135* 136 138   K 3.9 3.4* 3.4*  3.4* 3.8  3.8    100 101 102   CO2 23.0 26.0 26.0 29.0   ALKPHO 62  --  60 58   AST 11  --  9 12   ALT <7*  --  <7* <7*   BILT 0.5  --  0.6 0.5   TP 6.3  --  6.1 6.0       Estimated Glomerular Filtration Rate: 58 mL/min/1.73m2 (A) (result from lab).    No results for input(s): \"TROP\", \"TROPHS\", \"CK\" in the last 168 hours.      Recent Labs   Lab 25  1739 25  0411   PTP 14.8 15.1*   INR 1.14 1.18                   Microbiology    No results found for this visit on 03/30/25.      Imaging: Reviewed in Epic.    Medications:    piperacillin-tazobactam  4.5 g Intravenous Q8H    ipratropium-albuterol  3 mL Nebulization Q6H WA    pantoprazole  40 mg Intravenous Q24H    multivitamin  1 tablet Oral Daily    docusate sodium  100 mg Oral BID    polyethylene glycol (PEG 3350)  17 g Oral Daily       Assessment & Plan:      #Aortic arch dissection with aneurysm   #L renal artery dissection  Vascular surgery following  S/p left carotid to subclavian transposition, endovascular arch repair, stenting of the left renal, left iliac  Repeat CTA 4/5 with some dissection within the left subclavian artery as well as some persistent flap with suboptimal expansion of the stent graft - plan for intervention today  -Heparin drip per vascular surgery      #Prior hx of Type A aortic dissection  S/p repair 11/2024  Post op complications with rep failure and NAIMA needing trach and peg that are now removed    #Pericardial fluid noted on CTA - monitor     #Sepsis 2/2 aspiration pneumonia   #Acute hypoxic respiratory failure   #Moderate left pleural effusion with atelectasis/pneumonia  -IV zosyn     #Ileus - bowel regimen     #LUE weakness   Suspected 2/2 to dissection and cord ischemia  -Monitor      #Ess HTN    #Dyslipidemia  zetia     #CKD3     #TANYA     #Obesity, BMI 36    Jade Cameron MD    Supplementary Documentation:     Quality:  DVT Mechanical Prophylaxis:   SCDs, Early ambuation  DVT Pharmacologic Prophylaxis   Medication    heparin (Porcine) 26091 units/250mL infusion ACS/AFIB CONTINUOUS                Code Status: Full Code  Webb: External urinary catheter in place  Webb Duration (in days): 2  Central line:    BRANDON:     Discharge is dependent on: course  At this point Ms. Wilde is expected to be discharge to: unclear    The 21st Century Cures Act makes medical notes like these available to patients in the interest of  transparency. Please be advised this is a medical document. Medical documents are intended to carry relevant information, facts as evident, and the clinical opinion of the practitioner. The medical note is intended as peer to peer communication and may appear blunt or direct. It is written in medical language and may contain abbreviations or verbiage that are unfamiliar.     Dietitian Malnutrition Assessment    Evaluation for Malnutrition: Criteria for non-severe malnutrition diagnosis-         chronic illness related to   Wt loss 7.5% in 3 months., Energy intake less than 75% for greater than 1 month.       RD Malnutrition Care Plan: Intiated ONS (oral nutritional supplements)., Ordered multivitamin.    Body Fat/Muscle Mass:          Physician Assessment     Patient has a diagnosis of moderate malnutrition

## 2025-04-07 NOTE — PROGRESS NOTES
Patient was to overnight events only drop in hemoglobin but repeat hemoglobin at noon today was stable.  Increasing drainage around the SERVANDO drain at the left chest site that started after patient was started on heparin.  Abdomen is distended but soft nontender she just feels bloated generally.    She has good pulses in her bilateral upper and lower extremities.  There is minimal pretibial edema on exam.    Her labs are showing a sodium of 138, potassium 3.8, bicarb of 29, creatinine of 1.08, AST and ALT are normal, PTT is 41, hemoglobin is 8 white blood cell count is 14,000 and platelet count is 172,000.    I did a bedside echo which did demonstrate a small trace pericardial effusion without hemodynamic significance.  LV was thick but hyperdynamic.  RV was normal size IVC was collapsible.    In summary this is a 59-year-old female that presents to us with a type B dissection status post endovascular repair    Problems  Chronic hypertension  Type B dissection  History of type a dissection status post repair of the arch  History of tracheostomy reversed  Hyperlipidemia  Chronic kidney disease stage III    Plan    Neuro    Pain control  She has hydrocodone as well as hydromorphone ordered    Cardiovascular    Bedside echo demonstrated the patient had preserved overall ejection fraction normal RV  Trace pericardial effusion.  No signs of hemodynamic significance  May need an echo postoperatively    Blood pressure control has been manage the patient is currently on no antihypertensives does have as needed ordered's Cardene if needed for systolics less than 140    Patient had arch dissection and left renal artery dissection and left carotid transposition with endovascular arch repair she is going to go back to the OR today for extension of the graft.    Respiratory  Acute hypoxic respiratory failure secondary to aspiration pneumonia  Improved  She was put on antibiotics with Zosyn will continue until she comes out of the  operating room probably complete 5 to 7 days of total antibiotic exposure  Her chest x-ray last was done on the fifth repeat chest x-ray today preoperatively has been ordered but with improvement in her oxygenation and improvement in coughing I think the treatment course of 5 to 7 days is reasonable probably a nonpneumonic aspiration pneumonitis due to ileus    GI  Abdominal ileus  She is taking p.o. though she is going to surgery today so she is n.p.o. for this  Will follow-up after the patient goes to the operating room her abdomen was distended but soft  She did have a CT scan of the abdomen and pelvis there was fluid-filled small bowel and moderate stool in the colon and fluid in the mesentery and diffuse anasarca.    Renal  Creatinine remains normal  The patient is overall net positive about 5 L she is up about 14 pounds this admission.  She will need more diuretics coming in the next several days.  I will leave her more in the fluid positive side until after she has surgery today  Creatinine has recovered to normal    Hypertension blood pressure is controlled    Infectious diseases  As mentioned Zosyn has been ordered she was started on Zosyn on the sixth we can and Zosyn on the 12th after 7 total days of antibiotics.    GI  Ileus  Will start her on MiraLAX twice daily  Colace  She is n.p.o. for surgery today    Heme  She is on a heparin drip per recommendations from vascular surgery  The patient's hemoglobin has dropped to though she is postsurgical and having some oozing from the sites  Hemoglobin currently stable at 8  I updated the vascular surgery team  I did not preoperatively transfuse her at 8 we will follow-up if they want an additional unit of PRBCs    Endocrine  Her blood sugars have been reasonably well-controlled 109 today  She is on no insulin    Her disposition is the medical ICU    Critical care attending    Date of service was April 7, 2025

## 2025-04-07 NOTE — BRIEF OP NOTE
Pre-Operative Diagnosis: Endoleak of thoracic stent graft     Post-Operative Diagnosis: Endoleak of thoracic stent graft     Procedure Performed:  Thoracic stent graft   Balloon angioplasty and stent right external iliac artery     Surgeons and Role:     * Magdy Palm MD - Primary     * Frank Holder MD - Assisting Surgeon    Assistant(s):        Surgical Findings:   Resolution of endoleak /graft infolding      Specimen: none     Estimated Blood Loss: Blood Output: 800 mL (4/2/2025  8:34 PM)        Frank Holder MD  4/7/2025  6:58 PM

## 2025-04-07 NOTE — CM/SW NOTE
Notified by PT that Anticipated therapy need: Intensive Therapy Program  PMR was consulted over the weekend per Dr Palm request.    PMR evaluated pt yesterday and will continue to follow, pending pt progress after surgical intervention scheduled later today.    Initial plan was HH and referral was sent and pt has list of available providers.  Would need HH choice if goes home instead.    / to remain available for support and/or discharge planning.     Koki GNOZALEZA MSN, RN Case Manager  [phone]

## 2025-04-07 NOTE — PLAN OF CARE
Problem: CARDIOVASCULAR - ADULT  Goal: Absence of cardiac arrhythmias or at baseline  Description: INTERVENTIONS:- Continuous cardiac monitoring, monitor vital signs, obtain 12 lead EKG if indicated- Evaluate effectiveness of antiarrhythmic and heart rate control medications as ordered- Initiate emergency measures for life threatening arrhythmias- Monitor electrolytes and administer replacement therapy as ordered  Outcome: Progressing

## 2025-04-07 NOTE — ANESTHESIA PROCEDURE NOTES
Airway  Date/Time: 4/7/2025 5:20 PM  Urgency: elective      General Information and Staff    Patient location during procedure: OR  Anesthesiologist: Angel House MD  Performed: anesthesiologist   Performed by: Angel House MD  Authorized by: Angel House MD      Indications and Patient Condition  Indications for airway management: anesthesia  Sedation level: deep  Preoxygenated: yes  Patient position: sniffing  Mask difficulty assessment: 0 - not attempted    Final Airway Details  Final airway type: endotracheal airway      Successful airway: ETT  Cuffed: yes   Successful intubation technique: Video laryngoscopy  Endotracheal tube insertion site: oral  Blade: GlideScope  Blade size: #4  ETT size (mm): 7.5    Cormack-Lehane Classification: grade I - full view of glottis  Placement verified by: capnometry   Measured from: lips  ETT to lips (cm): 21  Number of attempts at approach: 1

## 2025-04-07 NOTE — PLAN OF CARE
Received patient following report. Patient alert and oriented. Vitals stable. 3L NC/CPAP at [facility]. Afebrile. Voiding via purewick/bedpan. Central line intact. Dressing changed d/t bleeding at site. SERVANDO draining serosang output. Dressing changed d/t bleeding at site. 1 episode of emesis, zofran given. NPO at MN for possible surgery. Heparin gtt infusing. See flowsheets and MAR.

## 2025-04-07 NOTE — ANESTHESIA PREPROCEDURE EVALUATION
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  Temp: 99.8 °F (37.7 °C)  Pulse: 100  Resp: 25  BP: 151/73  SpO2: 94 %  Body mass index is 39.33 kg/m².    Current medications reviewed.  Hospital Medications:   [COMPLETED] potassium chloride 20 mEq/100mL IVPB premix 20 mEq  20 mEq Intravenous Once    [Transfer Hold] iodixanol (VISIPAQUE) 320 MG/ML injection 100 mL  100 mL Injection ONCE PRN    [COMPLETED] sodium chloride 0.9% infusion   Intravenous Once    [COMPLETED] potassium chloride 40 mEq in 250mL sodium chloride 0.9% IVPB premix  40 mEq Intravenous Once    piperacillin-tazobactam (Zosyn) 4.5 g in dextrose 5% 100 mL IVPB-ADDV  4.5 g Intravenous Q8H    [COMPLETED] potassium chloride 40 mEq in 250mL sodium chloride 0.9% IVPB premix  40 mEq Intravenous Once    [COMPLETED] iopamidol 76% (ISOVUE-370) injection for power injector  100 mL Intravenous ONCE PRN    [Transfer Hold] ipratropium-albuterol (Duoneb) 0.5-2.5 (3) MG/3ML inhalation solution 3 mL  3 mL Nebulization Q6H WA    [Transfer Hold] pantoprazole (Protonix) 40 mg in sodium chloride 0.9% PF 10 mL IV push  40 mg Intravenous Q24H    [COMPLETED] heparin (Porcine) 1000 UNIT/ML injection - BOLUS IV 5,000 Units  5,000 Units Intravenous Once    [COMPLETED] heparin (Porcine) 69929 units/250 mL infusion (ACS/AFIB) INITIAL DOSE  1,000 Units/hr Intravenous Once    heparin (Porcine) 67071 units/250mL infusion ACS/AFIB CONTINUOUS  200-3,000 Units/hr Intravenous Continuous    [Transfer Hold] bisacodyl (Dulcolax) 10 MG rectal suppository 10 mg  10 mg Rectal Daily PRN    [Transfer Hold] multivitamin (Tab-A-Treasure/Beta  Carotene) tab 1 tablet  1 tablet Oral Daily    [Transfer Hold] simethicone (Mylicon) chewable tab 160 mg  160 mg Oral QID PRN    [COMPLETED] potassium chloride 20 mEq/100mL IVPB premix 20 mEq  20 mEq Intravenous Once    [Transfer Hold] docusate sodium (Colace) cap 100 mg  100 mg Oral BID    [Transfer Hold] polyethylene glycol (PEG 3350) (Miralax) 17 g oral packet 17 g  17 g Oral Daily    [COMPLETED] bisacodyl (Dulcolax) 10 MG rectal suppository 10 mg  10 mg Rectal Once    [COMPLETED] furosemide (Lasix) 10 mg/mL injection 40 mg  40 mg Intravenous Once    [COMPLETED] verapamil (Isoptin) 2.5 mg/mL injection        [COMPLETED] Nitroglycerin in D5W 200-5 MCG/ML-% injection        [] lactated ringers IV bolus 500 mL  500 mL Intravenous Once PRN    [] atropine 0.1 MG/ML injection 0.5 mg  0.5 mg Intravenous PRN    [] naloxone (Narcan) 0.4 MG/ML injection 0.08 mg  0.08 mg Intravenous PRN    [] fentaNYL (Sublimaze) 50 mcg/mL injection 25 mcg  25 mcg Intravenous Q5 Min PRN    Or    [] fentaNYL (Sublimaze) 50 mcg/mL injection 50 mcg  50 mcg Intravenous Q5 Min PRN    [] HYDROmorphone (Dilaudid) 1 MG/ML injection 0.2 mg  0.2 mg Intravenous Q5 Min PRN    Or    [] HYDROmorphone (Dilaudid) 1 MG/ML injection 0.4 mg  0.4 mg Intravenous Q5 Min PRN    Or    [] HYDROmorphone (Dilaudid) 1 MG/ML injection 0.6 mg  0.6 mg Intravenous Q5 Min PRN    [COMPLETED] iodixanol (VISIPAQUE) 320 MG/ML injection 150 mL  150 mL Injection ONCE PRN    [Transfer Hold] acetaminophen (Tylenol) tab 650 mg  650 mg Oral Q4H PRN    Or    [Transfer Hold] HYDROcodone-acetaminophen (Norco) 5-325 MG per tab 1 tablet  1 tablet Oral Q4H PRN    Or    [Transfer Hold] HYDROcodone-acetaminophen (Norco) 5-325 MG per tab 2 tablet  2 tablet Oral Q4H PRN    [Transfer Hold] ondansetron (Zofran) 4 MG/2ML injection 4 mg  4 mg Intravenous Q6H PRN    [Transfer Hold] prochlorperazine (Compazine) 10 MG/2ML injection 5 mg  5 mg  Intravenous Q8H PRN    [Transfer Hold] HYDROmorphone (Dilaudid) 1 MG/ML injection 0.2 mg  0.2 mg Intravenous Q2H PRN    Or    [Transfer Hold] HYDROmorphone (Dilaudid) 1 MG/ML injection 0.4 mg  0.4 mg Intravenous Q2H PRN    Or    [Transfer Hold] HYDROmorphone (Dilaudid) 1 MG/ML injection 0.8 mg  0.8 mg Intravenous Q2H PRN    niCARdipine in sodium chloride 0.86% (carDENE) 20 mg/200mL infusion premix  5-15 mg/hr Intravenous Continuous PRN    norepinephrine (Levophed) 4 mg/250mL infusion premix  0.5-30 mcg/min Intravenous Continuous PRN    [COMPLETED] ceFAZolin (Ancef) 2g in 10mL IV syringe premix  2 g Intravenous Q8H    [COMPLETED] furosemide (Lasix) 10 mg/mL injection 40 mg  40 mg Intravenous Once    [COMPLETED] iopamidol 76% (ISOVUE-370) injection for power injector  100 mL Intravenous ONCE PRN    [COMPLETED] iopamidol 76% (ISOVUE-370) injection for power injector  80 mL Intravenous ONCE PRN    [] nitroGLYCERIN in dextrose 5% 50 mg/250mL infusion premix        [COMPLETED] lidocaine (Xylocaine) 1 % injection  10 mL Intradermal Once    [COMPLETED] potassium phosphate dibasic 15 mmol in sodium chloride 0.9% 250 mL IVPB  15 mmol Intravenous Once    Followed by    [COMPLETED] potassium chloride 20 mEq/100mL IVPB premix 20 mEq  20 mEq Intravenous Once       Outpatient Medications:   Prescriptions Prior to Admission[1]    Allergies: Atorvastatin, Pravastatin, Rosuvastatin, and Seasonal      Anesthesia Evaluation    Patient summary reviewed.    Anesthetic Complications  (-) history of anesthetic complications         GI/Hepatic/Renal      (+) GERD                           Cardiovascular  Comment: Type A and B dissection                (+) hypertension                                     Endo/Other      (+) diabetes                            Pulmonary                           Neuro/Psych                                      Past Surgical History:   Procedure Laterality Date    Anesth,open heart surgery  2024     Colonoscopy N/A 2018    Procedure: COLONOSCOPY;  Surgeon: Magdy Webber MD;  Location: [facility] ENDOSCOPY    Endometrial ablation      child passed away for 5 mins          1    Other surgical history  2011    cysto-Dr. Lacey -- pt denies     Social History     Socioeconomic History    Marital status: Single   Tobacco Use    Smoking status: Former     Current packs/day: 0.00     Average packs/day: 1 pack/day for 13.0 years (13.0 ttl pk-yrs)     Types: Cigarettes     Start date:      Quit date:      Years since quittin.2    Smokeless tobacco: Former   Vaping Use    Vaping status: Never Used   Substance and Sexual Activity    Alcohol use: Not Currently     Alcohol/week: 1.0 - 2.0 standard drink of alcohol     Types: 1 - 2 Cans of beer per week     Comment: every day  NOT DRINKING FOR LAS 4 MONTHS    Drug use: No     History   Drug Use No     Available pre-op labs reviewed.  Lab Results   Component Value Date    WBC 14.1 (H) 2025    RBC 3.19 (L) 2025    HGB 8.0 (L) 2025    HCT 25.4 (L) 2025    MCV 79.6 (L) 2025    MCH 25.1 (L) 2025    MCHC 31.5 2025    RDW 16.4 2025    .0 2025     Lab Results   Component Value Date     2025    K 3.8 2025    K 3.8 2025     2025    CO2 29.0 2025    BUN 9 2025    CREATSERUM 1.08 (H) 2025     (H) 2025    CA 8.8 2025     Lab Results   Component Value Date    INR 1.18 2025         Airway      Mallampati: II  Mouth opening: 3 FB  TM distance: 4 - 6 cm  Neck ROM: full Cardiovascular      Rhythm: regular  Rate: normal     Dental  Comment: No loose teeth reported by patient           Pulmonary      Breath sounds clear to auscultation bilaterally.               Other findings              ASA: 3   Plan: general  NPO status verified and patient meets guidelines.        Comment: Discussed general anesthesia with  endotracheal tube/supraglottic airway with patient. Discussed risk of oral/dental trauma, nausea, rare allergic reactions. Patient understands the risks, benefits, and requirement for anesthesia and willing to proceed. Consent signed.        Plan/risks discussed with: patient                Present on Admission:   Benign essential HTN             [1]   Medications Prior to Admission   Medication Sig Dispense Refill Last Dose/Taking    cloNIDine 0.3 MG/24HR Transdermal Patch Weekly Place 1 patch onto the skin once a week. Pt reports applying on Saturdays   Past Week    acetaminophen 500 MG Oral Tab Take 2 tablets (1,000 mg total) by mouth every 6 (six) hours as needed for Pain.   3/29/2025    aspirin 81 MG Oral Tab EC Take 1 tablet (81 mg total) by mouth daily.   3/29/2025    thiamine 100 MG Oral Tab Take 1 tablet (100 mg total) by mouth daily.   3/29/2025    ezetimibe 10 MG Oral Tab Take 1 tablet (10 mg total) by mouth daily.   3/29/2025 Morning    hydrALAZINE 50 MG Oral Tab Take 1 tablet (50 mg total) by mouth 3 (three) times daily. 270 tablet 1 3/30/2025 Morning    pantoprazole 40 MG Oral Tab EC Take 1 tablet (40 mg total) by mouth every morning before breakfast. 90 tablet 3 Past Week    bisacodyl 5 MG Oral Tab EC Take 1 tablet (5 mg total) by mouth daily as needed. 30 tablet 0 Past Week    docusate sodium (COLACE) 100 MG Oral Cap Take 1 capsule (100 mg total) by mouth 2 (two) times daily as needed for constipation. 180 capsule 1 3/29/2025    Ferrous Gluconate 324 (37.5 Fe) MG Oral Tab Take 1 tablet (324 mg total) by mouth daily. 90 tablet 1 3/29/2025 Morning    carvedilol 25 MG Oral Tab Take 1.5 tablets (37.5 mg total) by mouth daily.   3/29/2025 Morning    azelastine 0.1 % Nasal Solution 1 spray by Nasal route 2 (two) times daily. (Patient taking differently: 1 spray by Nasal route daily as needed for Rhinitis.) 3 each 1 Past Week    nystatin 044589 UNIT/GM External Powder Apply 1 Application topically 3 (three)  times daily. 60 g 2 3/29/2025 Morning    mirtazapine 7.5 MG Oral Tab 1 tablet (7.5 mg total) by Per G Tube route nightly. (Patient taking differently: 1 tablet (7.5 mg total) by Per G Tube route as needed.) 30 tablet 0 Past Week    amLODIPine 10 MG Oral Tab Take 1 tablet (10 mg total) by mouth daily. 90 tablet 3 3/29/2025 Morning

## 2025-04-07 NOTE — PLAN OF CARE
Assumed care of pt at change of shift. Pt aox4, delirium negative. Low grade temps , SR on tele (weaned to room air), encouraged use of IS, sbp wdl for pt sbp 130-140. C/o pain this afternoon to left upper chest, seen by ICU md. Repeat hgb unchanged. Dressing to left upper chest drain changed site without complications, minimal output in drain, leaking at site. 1 unit prbc to be transfused prior to OR per vascular surgery. Pt oob to chair/bathroom and ambulated in hallway with pt/ot. Purewick for I/o, 1 bm this am. Prn dilaudid for pain with relief. Heparin gtt infusing per protocol. Rt IJ  cordis w/ TLC and 1 piv. Plan for back to OR for stent/ leak repair.

## 2025-04-07 NOTE — OCCUPATIONAL THERAPY NOTE
OCCUPATIONAL THERAPY TREATMENT NOTE - INPATIENT     Room Number: 474/474-A  Session: 1   Number of Visits to Meet Established Goals: 5    Presenting Problem: s/p endovascular aortic arch repair 4/2        ASSESSMENT   Patient demonstrates fair progress this session, goals remain in progress.    Patient continues to function below baseline with toileting, bathing, upper body dressing, lower body dressing, grooming, transfers, dynamic standing balance, and performing household tasks.   Contributing factors to remaining limitations include decreased functional strength, decreased functional reach, pain, impaired standing balance, limited LUE ROM, and decreased insight to deficits.  Next session anticipate patient to progress toileting, lower body dressing, and grooming.  Occupational Therapy will continue to follow patient for duration of hospitalization.    Patient continues to benefit from continued skilled OT services: to facilitate return to prior level of function as patient demonstrates high motivation with excellent tolerance to an intensive therapy program.     History:      Patient is a 59 year old female admitted on 3/30/2025 with Presenting Problem: s/p endovascular aortic arch repair 4/2. Co-Morbidities : CKD, previous aneurysm repair, DM, HTN, HL, obesity     Patient is a 59 year old female admitted on 3/30/2025 from home for several week c/o of chest and subscapular pain.  Pt diagnosed with aortic arch aneurysm.  Pt is now s/p LEFT CAROTID TO SUBCLAVIAN TRANSPOSITION, ENDOVASCULAR ARCH REPAIR. STENTING OF THE LEFT RENAL, LEFT ILIAC - per chart on 4/2   WEIGHT BEARING RESTRICTION       Recommendations for nursing staff:   Transfers: 1 person , RW  Toileting location: bathroom     TREATMENT SESSION:  Patient Start of Session: chair    FUNCTIONAL TRANSFER ASSESSMENT  Sit to Stand: Edge of Bed  Edge of Bed: Contact Guard Assist    BED MOBILITY  Supine to Sit : Stand-by Assist    BALANCE ASSESSMENT      FUNCTIONAL ADL ASSESSMENT  Eating: Modified Independent (increased time for opening packages)  LB Dressing Seated: Maximum Assist (to thread Depends)  Toileting Seated: Stand-by Assist  Toileting Standing: Moderate Assist    ACTIVITY TOLERANCE: Denied shortness of breath or dizziness           BP: 125/76  BP Location: Left arm  BP Method: Automatic  Patient Position: Sitting    O2 SATURATIONS  Oxygen Therapy  SPO2% on Room Air at Rest: 100  SPO2% on Oxygen at Rest: 3    EDUCATION PROVIDED  Patient Education : Functional Transfer Techniques; Posture/Positioning; Plan of Care; LE HEP for Strengthening  Patient's Response to Education: Verbalized Understanding; Requires Further Education    Equipment used: rw, gait belt  Demonstrates functional use, Would benefit from additional trial yes     Exercises:    Exercises Repetitions Comments   Scapular elevation     Scapular retraction     Shoulder rolls 5    Shoulder flexion     Shoulder abduction     Shoulder internal/external rotation 5    Forward punch     Elbow flexion 5    Elbow extension 5    Forearm pronation/supination 10    Wrist flexion/extension     Gross grasp/fist pumps 10    Ankle pumps     Knee extension     Marching       UPPER EXTREMITY  ROM: within functional limits except for the following:  Left Shoulder flexion PROM intact; cannot perform AROM for flexion; can adduct abduct and extend against gravity   Strength: is within functional limits except for the following;  Left Shoulder flexion  2-/5      Therapist comments:   Patient cued for sequencing trunk flexion and hand placement for transfers. Functional mobility completed with MIN A and RW . Patient with limited proximal LUE movement reportedly since surgery this admission, but able to use distal UE for pulling up Depends. Patient c/o pain in RUE due to IV. OT instructed patient on seated UE AROM therex. Patient updated o therapy plan of care.     Patient End of Session: Hospital anti-slip socks,  All patient questions and concerns addressed, RN aware of session/findings, Call light within reach, Needs met, Up in chair, Alarm set    SUBJECTIVE  \"I can't use this arm , it hurts where the IV is.\"    PAIN ASSESSMENT  Rating: Unable to rate  Location: IV in RUE  Management Techniques: Nurse notified, Relaxation     OBJECTIVE  Precautions: Drain(s)    AM-PAC ‘6-Clicks’ Inpatient Daily Activity Short Form  -   Putting on and taking off regular lower body clothing?: A Lot  -   Bathing (including washing, rinsing, drying)?: A Lot  -   Toileting, which includes using toilet, bedpan or urinal? : A Lot  -   Putting on and taking off regular upper body clothing?: A Little  -   Taking care of personal grooming such as brushing teeth?: A Little  -   Eating meals?: None    AM-PAC Score:  Score: 16  Approx Degree of Impairment: 53.32%  Standardized Score (AM-PAC Scale): 35.96    PLAN     OT Treatment Plan: Balance activities, ADL training, Functional transfer training, Patient/Family education, Patient/Family training, Compensatory technique education, Energy conservation/work simplification techniques, UE strengthening/ROM, Endurance training, Equipment eval/education, Fine motor coordination activities, Neuromuscluar reeducation  Rehab Potential : Good  Frequency: 3-5x/week    OT Goals: ongoing 4/7/25  ADL Goals   Patient will perform grooming: with modified independent  Patient will perform lower body dressing:  with modified independent  Patient will perform toileting: with modified independent     Functional Transfer Goals  Patient will transfer to toilet:  with modified independent     UE Exercise Program Goal  Patient will be supervision with left AROM/AAROM HEP (home exercise program).    New Goal 4/7/25:  PHQ9 screening      OT Session Time: 24 minutes  Self-Care Home Management: 8 minutes  Therapeutic Activity: 12 minutes  Therapeutic Exercise: 4 minutes

## 2025-04-08 ENCOUNTER — APPOINTMENT (OUTPATIENT)
Dept: CV DIAGNOSTICS | Facility: HOSPITAL | Age: 60
End: 2025-04-08
Payer: COMMERCIAL

## 2025-04-08 ENCOUNTER — APPOINTMENT (OUTPATIENT)
Dept: GENERAL RADIOLOGY | Facility: HOSPITAL | Age: 60
End: 2025-04-08
Payer: COMMERCIAL

## 2025-04-08 LAB
ALBUMIN SERPL-MCNC: 3.7 G/DL (ref 3.2–4.8)
ALBUMIN/GLOB SERPL: 1.3 {RATIO} (ref 1–2)
ALP LIVER SERPL-CCNC: 64 U/L
ALT SERPL-CCNC: <7 U/L
ANION GAP SERPL CALC-SCNC: 7 MMOL/L (ref 0–18)
AST SERPL-CCNC: 13 U/L (ref ?–34)
BASOPHILS # BLD AUTO: 0.03 X10(3) UL (ref 0–0.2)
BASOPHILS NFR BLD AUTO: 0.2 %
BILIRUB SERPL-MCNC: 0.4 MG/DL (ref 0.2–1.1)
BLOOD TYPE BARCODE: 7300
BUN BLD-MCNC: 11 MG/DL (ref 9–23)
CALCIUM BLD-MCNC: 9.3 MG/DL (ref 8.7–10.6)
CHLORIDE SERPL-SCNC: 107 MMOL/L (ref 98–112)
CO2 SERPL-SCNC: 25 MMOL/L (ref 21–32)
CREAT BLD-MCNC: 1.17 MG/DL
EGFRCR SERPLBLD CKD-EPI 2021: 53 ML/MIN/1.73M2 (ref 60–?)
EOSINOPHIL # BLD AUTO: 0 X10(3) UL (ref 0–0.7)
EOSINOPHIL NFR BLD AUTO: 0 %
ERYTHROCYTE [DISTWIDTH] IN BLOOD BY AUTOMATED COUNT: 16.2 %
GLOBULIN PLAS-MCNC: 2.8 G/DL (ref 2–3.5)
GLUCOSE BLD-MCNC: 145 MG/DL (ref 70–99)
HCT VFR BLD AUTO: 28.2 %
HGB BLD-MCNC: 9.1 G/DL
IMM GRANULOCYTES # BLD AUTO: 0.11 X10(3) UL (ref 0–1)
IMM GRANULOCYTES NFR BLD: 0.9 %
ISTAT ACTIVATED CLOTTING TIME: 210 SECONDS (ref 74–137)
ISTAT ACTIVATED CLOTTING TIME: 228 SECONDS (ref 74–137)
LYMPHOCYTES # BLD AUTO: 0.58 X10(3) UL (ref 1–4)
LYMPHOCYTES NFR BLD AUTO: 4.5 %
MAGNESIUM SERPL-MCNC: 2.1 MG/DL (ref 1.6–2.6)
MCH RBC QN AUTO: 25.7 PG (ref 26–34)
MCHC RBC AUTO-ENTMCNC: 32.3 G/DL (ref 31–37)
MCV RBC AUTO: 79.7 FL
MONOCYTES # BLD AUTO: 1.09 X10(3) UL (ref 0.1–1)
MONOCYTES NFR BLD AUTO: 8.5 %
NEUTROPHILS # BLD AUTO: 11.08 X10 (3) UL (ref 1.5–7.7)
NEUTROPHILS # BLD AUTO: 11.08 X10(3) UL (ref 1.5–7.7)
NEUTROPHILS NFR BLD AUTO: 85.9 %
OSMOLALITY SERPL CALC.SUM OF ELEC: 290 MOSM/KG (ref 275–295)
PHOSPHATE SERPL-MCNC: 4 MG/DL (ref 2.4–5.1)
PLATELET # BLD AUTO: 182 10(3)UL (ref 150–450)
POTASSIUM SERPL-SCNC: 4.3 MMOL/L (ref 3.5–5.1)
POTASSIUM SERPL-SCNC: 4.3 MMOL/L (ref 3.5–5.1)
PROT SERPL-MCNC: 6.5 G/DL (ref 5.7–8.2)
RBC # BLD AUTO: 3.54 X10(6)UL
SODIUM SERPL-SCNC: 139 MMOL/L (ref 136–145)
UNIT VOLUME: 350 ML
WBC # BLD AUTO: 12.9 X10(3) UL (ref 4–11)

## 2025-04-08 PROCEDURE — 99233 SBSQ HOSP IP/OBS HIGH 50: CPT | Performed by: INTERNAL MEDICINE

## 2025-04-08 PROCEDURE — 93306 TTE W/DOPPLER COMPLETE: CPT

## 2025-04-08 PROCEDURE — 71045 X-RAY EXAM CHEST 1 VIEW: CPT

## 2025-04-08 PROCEDURE — 99233 SBSQ HOSP IP/OBS HIGH 50: CPT

## 2025-04-08 NOTE — ANESTHESIA POSTPROCEDURE EVALUATION
[facility]    Alisha Sandovalughn Patient Status:  Inpatient   Age/Gender 59 year old female MRN [id number]   Location [facility] CARDIOVASCULAR SURGERY Attending Jade Cameron MD   Hosp Day # 8 PCP Bridger Avendano MD       Anesthesia Post-op Note    THORACIC ENDOGRAFT REPAIR WITH POSSIBLE SUBCLAVIAN STENT    Procedure Summary       Date: 04/07/25 Room / Location:  [facility] 03 HYBRID /  [facility]    Anesthesia Start: 1707 Anesthesia Stop: 1918    Procedure: THORACIC ENDOGRAFT REPAIR WITH POSSIBLE SUBCLAVIAN STENT Diagnosis: (endovascular leak)    Surgeons: Magdy Palm MD Anesthesiologist: Anshul Olvera MD    Anesthesia Type: general ASA Status: 3            Anesthesia Type: general    Vitals Value Taken Time   /62 04/07/25 1919   Temp 98.5 04/07/25 1919   Pulse 96 04/07/25 1919   Resp 16 04/07/25 1919   SpO2 93 04/07/25 1919           Patient Location: ICU    Anesthesia Type: general    Airway Patency: patent    Postop Pain Control: adequate    Mental Status: mildly sedated but able to meaningfully participate in the post-anesthesia evaluation    Nausea/Vomiting: none    Cardiopulmonary/Hydration status: stable euvolemic    Complications: no apparent anesthesia related complications    Postop vital signs: stable    Dental Exam: Unchanged from Preop    Sign out given to ICU staff.

## 2025-04-08 NOTE — PHYSICAL THERAPY NOTE
PHYSICAL THERAPY TREATMENT NOTE - INPATIENT    Room Number: 474/474-A     Session: 2     Number of Visits to Meet Established Goals: 5    Presenting Problem: Endovascular repair of aortic arch 4/2  Co-Morbidities : CKD, previous aneurysm repair, DM, HTN, HL, obesity    History related to current admission: Patient is a 59 year old female admitted on 3/30/2025 from home for several week c/o of chest and subscapular pain.  Pt diagnosed with aortic arch aneurysm.  Pt is now s/p LEFT CAROTID TO SUBCLAVIAN TRANSPOSITION, ENDOVASCULAR ARCH REPAIR. STENTING OF THE LEFT RENAL, LEFT ILIAC - per chart on 4/2  Pt w/ additional procedure on 4/7 - thoracic endograft repair for endoleak     HOME SITUATION  Type of Home: Condo  Home Layout: One level  Stairs to Enter : 0        Stairs to Bedroom: 0         Lives With: Alone    Drives: Yes           Prior Level of [place]: Pt is typically independent w/ adl's, gait w/ cane, drives and is currently on short term disability w/ [employer].    PHYSICAL THERAPY ASSESSMENT   Patient demonstrates fair progress this session, goals  remain in progress.      Patient is requiring minimal assist as a result of the following impairments: decreased functional strength, decreased endurance/aerobic capacity, impaired standing balance, decreased muscular endurance, medical status, and limited L elbow and shoulder active ROM.     Patient continues to function below baseline with bed mobility, transfers, gait, and performing household tasks.  Next session anticipate patient to progress bed mobility, transfers, and gait.  Physical Therapy will continue to follow patient for duration of hospitalization.    Patient continues to benefit from continued skilled PT services: to facilitate return to prior level of function as patient demonstrates high motivation with excellent tolerance to an intensive therapy program .  Pt w/ prolonged hospitalization and second procedure planned today.  Pt w/ new L shoulder  and biceps weakness since this admission limiting some of her functional activity.  Pt relying on rw for gait which is not her baseline. Pt lives alone and had been independent prior to admission (used a cane).  Pt is not near her baseline level at this time.    PLAN DURING HOSPITALIZATION  Nursing Mobility Recommendation : 1 Assist  PT Device Recommendation: Rolling walker  PT Treatment Plan: Bed mobility, Endurance, Patient education, Family education, Gait training, Range of motion, Transfer training, Balance training  Frequency (Obs): 3-5x/week     CURRENT GOALS     Goal #1 Patient is able to demonstrate supine - sit EOB @ level: modified independent      Goal #2 Patient is able to demonstrate transfers E[place] to/from [facility] at assistance level: modified independent      Goal #3 Patient is able to ambulate 250 feet with assist device:  least restrictive device  at assistance level: supervision      Goal #4     Goal #5     Goal #6     Goal Comments: Goals established on 4/3/2025  2025 all goals ongoing    SUBJECTIVE  \"The doctors keep telling me to move.  Every time you all come, I do the work.\"  Reassured pt that promotion of mobility is typical after these types of procedures and that we agree she is actively working w/ therapy/mobility.    OBJECTIVE  Precautions: Drain(s)    WEIGHT BEARING RESTRICTION     PAIN ASSESSMENT   Ratin  Location: Denied pain, none emerged during session  Management Techniques:  (N/a)    BALANCE                                                                                                                       Static Sitting: Good  Dynamic Sitting: Fair -           Static Standing: Fair -  Dynamic Standing: Poor +    ACTIVITY TOLERANCE  Pulse: 78  Heart Rate Source: Monitor  Resp: 26                O2 WALK  Oxygen Therapy  SPO2% on Room Air at Rest: 98  SPO2% Ambulation on Oxygen: 93    AM-PAC '6-Clicks' INPATIENT SHORT FORM - BASIC MOBILITY  How much difficulty does the patient  currently have...  Patient Difficulty: Turning over in bed (including adjusting bedclothes, sheets and blankets)?: None   Patient Difficulty: Sitting down on and standing up from a chair with arms (e.g., wheelchair, bedside commode, etc.): A Little   Patient Difficulty: Moving from lying on back to sitting on the side of the bed?: A Little   How much help from another person does the patient currently need...   Help from Another: Moving to and from a bed to a chair (including a wheelchair)?: A Little   Help from Another: Need to walk in hospital room?: A Little   Help from Another: Climbing 3-5 steps with a railing?: A Lot     AM-PAC Score:  Raw Score: 18   Approx Degree of Impairment: 46.58%   Standardized Score (AM-PAC Scale): 43.63   CMS Modifier (G-Code): CK    FUNCTIONAL ABILITY STATUS  Gait Assessment   Functional Mobility/Gait Assessment  Gait Assistance: Minimum assistance  Distance (ft): 100  Assistive Device: Rolling walker  Pattern: R Foot flat, L Foot flat (Mild foward flexion at hips)    Skilled Therapy Provided    Bed Mobility:  Rolling: R from sidelying w/ bed flat- mod I   Supine<>Sit: NT   Sit<>Supine: Reviewed extensively log rolling technique to assist w/ protection of trunk and safe transfers. Min a of 1 for L le only when returning to supine using log rolling technique.  Pt required a minute to recover respiratory rate after returning to supine - mildly fatigued w/ the activity.     Transfer Mobility:  Sit<>Stand: Performed 2x over course of session.  CGA from chair, better able to engage L ue w/ standing today.  Once in standing was able to maintain balance while pulling up her new brief.   Stand<>Sit: CGA, cues for safety technique.   Gait: Pt completed gait 100'x1 w/ rolling walker and min a of 1 due to increased unsteadiness toward end of gait as she fatigued.  Pt did have one standing rest.  Pt is on room air today.     Therapist's Comments: Did not complete le ex this a.m. as pt's friend  was visiting and pt clearly fatigued after gait and donning of brief.  Had been up in chair from 7 am to 11:45. Pt w/ ongoing L ue weakness particularly w/ biceps and shoulder elevation - limiting her functional activity. Pt remains motivated to participate and is hopeful to return home at d/c.      Patient End of Session: In bed, Needs met, Call light within reach, Bracing education provided per handout, All patient questions and concerns addressed (Friend present in room, rns aware of treatment session)    PT Session Time: 26 minutes  Gait Trainin minutes  Therapeutic Activity:10 minutes  Therapeutic Exercise: 0 minutes   Neuromuscular Re-education: 0 minutes

## 2025-04-08 NOTE — PROGRESS NOTES
[facility]   part of [facility]     Vascular Surgery Progress Note    Alisha Wilde Patient Status:  Inpatient    10/25/1965 MRN [id number]   Location [facility] 4SW-A Attending Jade Cameron MD   Hosp Day # 9 PCP Bridger Avendano MD     Objective:   Temp: 98.3 °F (36.8 °C)  Pulse: 79  Resp: 22  BP: 138/57  AO: 142/62  FiO2 (%): 30 %      Events Yesterday/Overnight:  Patient is a 59 year old female, POD 1 thoracic stent graft with balloon angioplasty and stent of the right external iliac artery with Dr. Holder. Patient previously underwent a left carotid to subclavian transposition, endovascular arch repair, stenting of the left renal and iliac arteries with Dr. Holder on . Patient is doing well. Eating a clear liquid diet this morning. /57. Hemoglobin 9.1. WBC 12.9. patient is up in the chair this morning. Precedex stopped.      Exam:  Left neck SERVANDO drain to suction, serosanguineous drainage present. Bilateral groins soft, no hematoma, no drainage present.  Palpable bilateral DP pulses. Audible DP and PT signals. Neurologically intact.  Patient able to move the bilateral lower extremities. Patient has slightly greater strength in the right lower extremity compared to left. Patient also has upper extremity strength and range of motion, greater ability of abduction of the right upper extremity compared to the left. Palpable bilateral radial pulses.    Impression/Plan:    Patient to continue with increasing dietary intake. She has tolerated clear liquids for breakfast, advancing to full liquids for lunch.    Patient can discontinue IV fluids.    Plan to transfer to the floor tomorrow.    Continue physical therapy.      Medications:  Current Facility-Administered Medications   Medication Dose Route Frequency    iodixanol (VISIPAQUE) 320 MG/ML injection 100 mL  100 mL Injection ONCE PRN    acetaminophen (Tylenol) tab 650 mg  650 mg Oral Q4H PRN    Or    HYDROcodone-acetaminophen (Norco) 5-325 MG  per tab 1 tablet  1 tablet Oral Q4H PRN    Or    HYDROcodone-acetaminophen (Norco) 5-325 MG per tab 2 tablet  2 tablet Oral Q4H PRN    ondansetron (Zofran) 4 MG/2ML injection 4 mg  4 mg Intravenous Q6H PRN    prochlorperazine (Compazine) 10 MG/2ML injection 5 mg  5 mg Intravenous Q8H PRN    sodium chloride 0.9% infusion   Intravenous Continuous    enoxaparin (Lovenox) 40 MG/0.4ML SUBQ injection 40 mg  40 mg Subcutaneous Daily    metoprolol (Lopressor) 5 mg/5mL injection 2.5 mg  2.5 mg Intravenous Q20 Min PRN    nitroGLYCERIN in dextrose 5% 50 mg/250mL infusion premix  5-400 mcg/min Intravenous Continuous PRN    niCARdipine in sodium chloride 0.86% (carDENE) 20 mg/200mL infusion premix  5-15 mg/hr Intravenous Continuous PRN    phenylephrine in sodium chloride 0.9% (Prince-Synephrine) 50 mg/250mL infusion premix   mcg/min Intravenous Continuous PRN    norepinephrine (Levophed) 4 mg/250mL infusion premix  0.5-30 mcg/min Intravenous Continuous PRN    clopidogrel (Plavix) tab 75 mg  75 mg Oral Daily    dexmedeTOMIDine in sodium chloride 0.9% (Precedex) 400 mcg/100mL infusion premix  0.2-1.5 mcg/kg/hr (Dosing Weight) Intravenous Continuous    piperacillin-tazobactam (Zosyn) 4.5 g in dextrose 5% 100 mL IVPB-ADDV  4.5 g Intravenous Q8H    ipratropium-albuterol (Duoneb) 0.5-2.5 (3) MG/3ML inhalation solution 3 mL  3 mL Nebulization Q6H WA    pantoprazole (Protonix) 40 mg in sodium chloride 0.9% PF 10 mL IV push  40 mg Intravenous Q24H    bisacodyl (Dulcolax) 10 MG rectal suppository 10 mg  10 mg Rectal Daily PRN    multivitamin (Tab-A-Treasure/Beta Carotene) tab 1 tablet  1 tablet Oral Daily    simethicone (Mylicon) chewable tab 160 mg  160 mg Oral QID PRN    docusate sodium (Colace) cap 100 mg  100 mg Oral BID    polyethylene glycol (PEG 3350) (Miralax) 17 g oral packet 17 g  17 g Oral Daily       Laboratory/Data:    LABS:  Recent Labs   Lab 04/01/25  1739 04/02/25  0235 04/03/25  0411 04/04/25  0644 04/06/25  0341  04/07/25  0612 04/07/25  1203 04/07/25 2019 04/08/25  0441   WBC 5.1   < > 13.6*   < > 16.4* 13.9* 14.1* 17.1* 12.9*   HGB 9.8*   < > 12.1   < > 8.9* 8.0* 8.0* 9.7* 9.1*   MCV 79.4*   < > 77.2*   < > 78.1* 79.4* 79.6* 79.2* 79.7*   .0   < > 198.0   < > 153.0  153.0 172.0  172.0 179.0 176.0 182.0   INR 1.14  --  1.18  --   --   --   --  1.19  --     < > = values in this interval not displayed.       Recent Labs   Lab 04/02/25  0235 04/02/25  2226 04/05/25  0838 04/06/25  0341 04/07/25  0612 04/07/25 2019 04/08/25  0441   *   < > 135* 136 138 135* 139   K 4.2   < > 3.4* 3.4*  3.4* 3.8  3.8 3.8 4.3  4.3   *   < > 100 101 102 102 107   CO2 15.0*   < > 26.0 26.0 29.0 23.0 25.0   BUN 16   < > 11 9 9 9 11   CREATSERUM 1.88*   < > 1.13* 1.07* 1.08* 1.07* 1.17*   *   < > 101* 94 109* 124* 145*   CA 8.8   < > 9.1 8.9 8.8 8.9 9.3   MG 2.2  --   --   --   --  2.0 2.1   PHOS 4.2  --   --   --   --  2.4 4.0    < > = values in this interval not displayed.     Recent Labs   Lab 04/01/25  1739 04/03/25  0411 04/05/25  1440 04/07/25  0612 04/07/25  1314 04/07/25 2019   PTP 14.8 15.1*  --   --   --  15.2*   INR 1.14 1.18  --   --   --  1.19   PTT 31.1 27.6   < > 41.9* 45.8* 30.0    < > = values in this interval not displayed.     Recent Labs   Lab 04/04/25  0644 04/06/25  0341 04/07/25  0612 04/07/25 2019 04/08/25  0441   ALT <7* <7* <7* <7* <7*   AST 11 9 12 15 13   ALB 3.7 3.6 3.5 3.7 3.7     No results for input(s): \"TROP\" in the last 168 hours.  Lab Results   Component Value Date    ANASCRN Negative 06/05/2021     No results for input(s): \"PCACT\", \"PSACT\", \"AT3ACT\", \"HIPAB\", \"PATHI\", \"STALA\", \"DRVVTRATIO\", \"DRVVT\", \"STACLOT\", \"CARIGG\", \"E3UH8PQRHO\", \"P2YK0DHQJQ\", \"RA\", \"HAVIGM\", \"HBCIGM\", \"HCVAB\", \"HBSAG\", \"HBCAB\", \"HBVDNAINTERP\", \"ANAS\", \"C3\", \"C4\" in the last 168 hours.    STACEY Murphy  4/8/2025  8:46 AM

## 2025-04-08 NOTE — PROGRESS NOTES
[facility]   [facility]     Alisha Wilde Patient Status:  Inpatient    10/25/1965 MRN [id number]   Location [facility] 4SW-A Attending Jade Cameron MD   Hosp Day # 9 PCP Bridger Avendano MD     Chief Complaint: back pain, chest pain     Subjective:     No new complaints. On Room air.     Objective:      Vital signs:  Temp:  [98.3 °F (36.8 °C)-100 °F (37.8 °C)] 98.9 °F (37.2 °C)  Pulse:  [] 81  Resp:  [15-28] 22  BP: (124-157)/(54-75) 143/61  SpO2:  [91 %-100 %] 94 %  AO: (120-159)/(58-66) 142/62  FiO2 (%):  [30 %-60 %] 30 %    Physical Exam:    General: NAD  HEENT: Oral mucosa moist  Heart: Normal S1 and S2. Regular rhythm  Lungs: CTAB  Abdomen: Soft, non-tender  Extremities: no edema  Neuro: NO facial asymmetry. Tongue protrudes midline. LUE abduction weakness     Diagnostic Data:    Labs:  Recent Labs   Lab 25  1739 25  0235 25  0411 25  0644 25  0341 25  0612 25  1203 25  0441   WBC 5.1   < > 13.6*   < > 16.4* 13.9* 14.1* 17.1* 12.9*   HGB 9.8*   < > 12.1   < > 8.9* 8.0* 8.0* 9.7* 9.1*   MCV 79.4*   < > 77.2*   < > 78.1* 79.4* 79.6* 79.2* 79.7*   .0   < > 198.0   < > 153.0  153.0 172.0  172.0 179.0 176.0 182.0   INR 1.14  --  1.18  --   --   --   --  1.19  --     < > = values in this interval not displayed.       Recent Labs   Lab 25  0612 25  0441   * 124* 145*   BUN 9 9 11   CREATSERUM 1.08* 1.07* 1.17*   CA 8.8 8.9 9.3   ALB 3.5 3.7 3.7    135* 139   K 3.8  3.8 3.8 4.3  4.3    102 107   CO2 29.0 23.0 25.0   ALKPHO 58 67 64   AST 12 15 13   ALT <7* <7* <7*   BILT 0.5 0.6 0.4   TP 6.0 6.6 6.5       Estimated Glomerular Filtration Rate: 53 mL/min/1.73m2 (A) (result from lab).    No results for input(s): \"TROP\", \"TROPHS\", \"CK\" in the last 168 hours.      Recent Labs   Lab 25  1739 25  0411 25  2019   PTP 14.8 15.1*  15.2*   INR 1.14 1.18 1.19                  Microbiology    No results found for this visit on 03/30/25.      Imaging: Reviewed in Epic.    Medications:    enoxaparin  40 mg Subcutaneous Daily    clopidogrel  75 mg Oral Daily    piperacillin-tazobactam  4.5 g Intravenous Q8H    ipratropium-albuterol  3 mL Nebulization Q6H WA    pantoprazole  40 mg Intravenous Q24H    multivitamin  1 tablet Oral Daily    docusate sodium  100 mg Oral BID    polyethylene glycol (PEG 3350)  17 g Oral Daily       Assessment & Plan:      #Aortic arch dissection with aneurysm   #L renal artery dissection  Vascular surgery following  S/p left carotid to subclavian transposition, endovascular arch repair, stenting of the left renal, left iliac  Repeat CTA 4/5 with some dissection within the left subclavian artery as well as some persistent flap with suboptimal expansion of the stent graft  S/p thoracic stent graft, balloon angioplasty and stent right external iliac artery 4/7     #Prior hx of Type A aortic dissection  S/p repair 11/2024  Post op complications with rep failure and NAIMA needing trach and peg that are now removed    #Pericardial fluid noted on CTA - monitor     #Sepsis 2/2 aspiration pneumonia   #Acute hypoxic respiratory failure   #Moderate left pleural effusion with atelectasis/pneumonia  -IV zosyn     #Ileus - bowel regimen     #LUE weakness   Suspected 2/2 to dissection/surgery. DW Dr. Palm.      #Ess HTN    #Dyslipidemia  zetia     #CKD3     #TANYA     #Obesity, BMI 36    Jade Cameron MD    Supplementary Documentation:     Quality:  DVT Mechanical Prophylaxis:   SCDs, Early ambuation  DVT Pharmacologic Prophylaxis   Medication    enoxaparin (Lovenox) 40 MG/0.4ML SUBQ injection 40 mg                Code Status: Full Code  Webb: No urinary catheter in place  Webb Duration (in days): 2  Central line:    BRANDON:     Discharge is dependent on: course  At this point Ms. Wilde is expected to be discharge to: unclear    The  21st Century Cures Act makes medical notes like these available to patients in the interest of transparency. Please be advised this is a medical document. Medical documents are intended to carry relevant information, facts as evident, and the clinical opinion of the practitioner. The medical note is intended as peer to peer communication and may appear blunt or direct. It is written in medical language and may contain abbreviations or verbiage that are unfamiliar.     Dietitian Malnutrition Assessment    Evaluation for Malnutrition: Criteria for non-severe malnutrition diagnosis-         chronic illness related to   Wt loss 7.5% in 3 months., Energy intake less than 75% for greater than 1 month.       RD Malnutrition Care Plan: Intiated ONS (oral nutritional supplements)., Ordered multivitamin.    Body Fat/Muscle Mass:          Physician Assessment     Patient has a diagnosis of moderate malnutrition

## 2025-04-08 NOTE — PROGRESS NOTES
Alisha Wilde Patient Status:  Inpatient    10/25/1965 MRN [id number]   [facility] 4SW-A Attending Jade Cameron MD   Hosp Day # 9 PCP Bridger Avendano MD     Critical Care Progress Note          Subjective/24h events:  S/p repair of endoleak overnight. On room air, up in chair with no complaints this morning.    Medications:   enoxaparin  40 mg Subcutaneous Daily    clopidogrel  75 mg Oral Daily    piperacillin-tazobactam  4.5 g Intravenous Q8H    ipratropium-albuterol  3 mL Nebulization Q6H WA    pantoprazole  40 mg Intravenous Q24H    multivitamin  1 tablet Oral Daily    docusate sodium  100 mg Oral BID    polyethylene glycol (PEG 3350)  17 g Oral Daily       FiO2 (%):  [30 %-60 %] 30 %      Intake/Output Summary (Last 24 hours) at 2025 0910  Last data filed at 2025 0700  Gross per 24 hour   Intake 3134.2 ml   Output 4162 ml   Net -1027.8 ml       /57   Pulse 79   Temp 98.3 °F (36.8 °C) (Temporal)   Resp 22   Wt 233 lb 4 oz (105.8 kg)   SpO2 94%   BMI 38.81 kg/m²     Physical Exam:    General Appearance: Alert, cooperative, no acute distress, appears stated age  Neck: No JVD, no adenopathy, trachea midline  Lungs: Clear to auscultation bilaterally, respirations unlabored  Heart: Regular rate and rhythm, S1 and S2 normal, no murmur, rub or gallop  Abdomen: Soft, non-tender, bowel sounds hypoactiveactive all four quadrants, no masses, no organomegaly  Extremities: Atraumatic, no cyanosis or edema, capillary refill <3 sec.    Pulses: 2+ and symmetric all extremities  Skin: Skin color, texture, turgor normal for ethnicity, no rashes or lesions, warm and dry  Neurologic: Generalized weakness, weakness to LUE-able to forearm, but unable to elevate elbow to gravity, no numbness/tingling    Recent Labs   Lab 25  0441   RBC 3.54*   HGB 9.1*   HCT 28.2*   MCV 79.7*   MCH 25.7*   MCHC 32.3   RDW 16.2   NEPRELIM 11.08*   WBC 12.9*   .0     Recent Labs   Lab 25  0612  04/07/25 2019 04/08/25  0441   * 124* 145*   BUN 9 9 11   CREATSERUM 1.08* 1.07* 1.17*   CA 8.8 8.9 9.3   ALB 3.5 3.7 3.7    135* 139   K 3.8  3.8 3.8 4.3  4.3    102 107   CO2 29.0 23.0 25.0   ALKPHO 58 67 64   AST 12 15 13   ALT <7* <7* <7*   BILT 0.5 0.6 0.4   TP 6.0 6.6 6.5     Recent Labs   Lab 04/02/25  1357   ABGPHT 7.35   PBTKPM9D 29*   EJPCZ5P 72*   ABGHCO3 18.3*   ABGBE -8.5*   TEMP 98.6   ANDIE Not Applicable   SITE Arterial Line   DEV Room Air   THGB 9.7*     No results for input(s): \"BNP\" in the last 168 hours.  No results for input(s): \"TROP\", \"CK\" in the last 168 hours.    XR CHEST AP PORTABLE  (CPT=71045)    Result Date: 4/7/2025  CONCLUSION:  Increased left pleural effusion and decreased aeration of left lung.   LOCATION:  [facility]      Dictated by (CST): Osito Skinner MD on 4/07/2025 at 8:27 PM     Finalized by (CST): Osito Skinner MD on 4/07/2025 at 8:28 PM       XR CHEST AP PORTABLE  (CPT=71045)    Result Date: 4/7/2025  CONCLUSION:  Enlarging left pleural effusion which is moderate in size with atelectasis and/or pneumonia within the left lung.   LOCATION:  [facility]      Dictated by (CST): Shaheen García MD on 4/07/2025 at 2:18 PM     Finalized by (CST): Shaheen García MD on 4/07/2025 at 2:22 PM           Assessment/Plan:  Type B dissection s/p endovascular repair on 4/2, now s/p thoracic stent graft 4/7  Hx Type A aortic dissection s/p repair November 2024  Acute hypoxic respiratory failure 2/2 aspiration pna  Ileus  Hx HTN  Hx HLD  CKD  Leukocytosis      Neuro/Psych: No encephalopathy.    Cardiovascular:  Type B dissection s/p endovascular repair on 4/2, now s/p thoracic stent graft 4/7  Hx Type A aortic dissection s/p repair November 2024  -Vascular surg following  -SBP goal , currently off vasopressors and drips  -Left neck drain per vasc surg  -Plavix    Hx HTN  Hx HLD    Pulmonary:  Acute hypoxic respiratory failure 2/2 aspiration pna  -CXR with left pleural effusion,  repeat CXR today  -currently on room air  -Encourage IS  -Zosyn (4/6- )    GI:  Ileus  -Scheduled and PRN bowel regimen  -CLD as tolerated    Renal/Metabolic:  CKD  -Creat 1.17, baseline appears to be 1.3-1.5  -Monitor I/O    Heme: No anemia or thrombocytopenia    ID:  Leukocytosis  -WBC down to 12.9 today  -Low grade temp  -Zosyn    Endocrine: No history of diabetes.      ICU checklist  Feeding: CLD  Analgesia: PRN norco  Sedation: n/a  Thromboembolism PPX: Lovenox, SCD  Stress Ulcer PPX: n/a  Glucose control: n/a  Bowel Regimen: scheduled and PRN bowel regimen  Lines/drains/tubes: PIV, SERVANDO drain to left neck, CVC  Deescalation of antibiotics: Zosyn (4/6- )  Therapies: PT/OT  Dispo: ICU, transfer out of ICU tomorrow if okay with vasc surg    Code Status: Full Code    Plan of care discussed with intensivist, Dr. Boswell.      ROCKY Howell-BC  Critical Care APRN  [id number]

## 2025-04-08 NOTE — PLAN OF CARE
Received patient at change of shift. Patient worked with physical therapy and occupational therapy throughout day. Encouraged incentive spirometer use. Diet advanced. See MAR and flowsheets.

## 2025-04-08 NOTE — PAYOR COMM NOTE
--------------  4/7- 8 CONTINUED STAY REVIEW    Payor: [employer] CHO[employer]/HMO/POS/EPO  Subscriber #:  [id number]  Authorization Number: [id number]    REMAINS IN ICU    4/7:    RETURNED TO OR    Pre-Operative Diagnosis: Endoleak of thoracic stent graft     Post-Operative Diagnosis: Endoleak of thoracic stent graft     Procedure Performed:  Thoracic stent graft   Balloon angioplasty and stent right external iliac artery       Surgical Findings:   Resolution of endoleak /graft infolding       Estimated Blood Loss: Blood Output: 800 mL (4/2/2025  8:34 PM)        4/8:    VASCULAR:    Objective:   Temp: 98.3 °F (36.8 °C)  Pulse: 79  Resp: 22  BP: 138/57  AO: 142/62  FiO2 (%): 30 %        Events Yesterday/Overnight:  Patient is a 59 year old female, POD 1 thoracic stent graft with balloon angioplasty and stent of the right external iliac artery with Dr. Holder. Patient previously underwent a left carotid to subclavian transposition, endovascular arch repair, stenting of the left renal and iliac arteries with Dr. Holder on 4/2. Patient is doing well. Eating a clear liquid diet this morning. /57. Hemoglobin 9.1. WBC 12.9. patient is up in the chair this morning. Precedex stopped.        Exam:  Left neck SERVANDO drain to suction, serosanguineous drainage present. Bilateral groins soft, no hematoma, no drainage present.  Palpable bilateral DP pulses. Audible DP and PT signals. Neurologically intact.  Patient able to move the bilateral lower extremities. Patient has slightly greater strength in the right lower extremity compared to left. Patient also has upper extremity strength and range of motion, greater ability of abduction of the right upper extremity compared to the left. Palpable bilateral radial pulses.     Impression/Plan:     Patient to continue with increasing dietary intake. She has tolerated clear liquids for breakfast, advancing to full liquids for lunch.     Patient can discontinue IV fluids.     Plan to  transfer to the floor tomorrow.     Continue physical therapy.          Lab 04/01/25  1739 04/02/25  0235 04/03/25  0411 04/04/25  0644 04/06/25  0341 04/07/25  0612 04/07/25  1203 04/07/25 2019 04/08/25  0441   WBC 5.1   < > 13.6*   < > 16.4* 13.9* 14.1* 17.1* 12.9*   HGB 9.8*   < > 12.1   < > 8.9* 8.0* 8.0* 9.7* 9.1*   MCV 79.4*   < > 77.2*   < > 78.1* 79.4* 79.6* 79.2* 79.7*   .0   < > 198.0   < > 153.0  153.0 172.0  172.0 179.0 176.0 182.0   INR 1.14  --  1.18  --   --   --   --  1.19  --      Lab 04/02/25  0235 04/02/25  2226 04/05/25  0838 04/06/25  0341 04/07/25  0612 04/07/25 2019 04/08/25  0441   *   < > 135* 136 138 135* 139   K 4.2   < > 3.4* 3.4*  3.4* 3.8  3.8 3.8 4.3  4.3   *   < > 100 101 102 102 107   CO2 15.0*   < > 26.0 26.0 29.0 23.0 25.0   BUN 16   < > 11 9 9 9 11   CREATSERUM 1.88*   < > 1.13* 1.07* 1.08* 1.07* 1.17*   *   < > 101* 94 109* 124* 145*   CA 8.8   < > 9.1 8.9 8.8 8.9 9.3   MG 2.2  --   --   --   --  2.0 2.1   PHOS 4.2  --   --   --   --  2.4 4.0     Lab 04/01/25  1739 04/03/25  0411 04/05/25  1440 04/07/25  0612 04/07/25  1314 04/07/25  2019   PTP 14.8 15.1*  --   --   --  15.2*   INR 1.14 1.18  --   --   --  1.19   PTT 31.1 27.6   < > 41.9* 45.8* 30.0     Lab 04/04/25  0644 04/06/25  0341 04/07/25  0612 04/07/25 2019 04/08/25  0441   ALT <7* <7* <7* <7* <7*   AST 11 9 12 15 13         CRITICAL CARE:    Subjective/24h events:  S/p repair of endoleak overnight. On room air, up in chair with no complaints this morning.     Medications:  Scheduled Medications    enoxaparin  40 mg Subcutaneous Daily    clopidogrel  75 mg Oral Daily    piperacillin-tazobactam  4.5 g Intravenous Q8H    ipratropium-albuterol  3 mL Nebulization Q6H WA    pantoprazole  40 mg Intravenous Q24H    multivitamin  1 tablet Oral Daily    docusate sodium  100 mg Oral BID    polyethylene glycol (PEG 3350)  17 g Oral Daily          FiO2 (%):  [30 %-60 %] 30 %      /57   Pulse  79   Temp 98.3 °F (36.8 °C) (Temporal)   Resp 22   Wt 233 lb 4 oz (105.8 kg)   SpO2 94%   BMI 38.81 kg/m²      Physical Exam:     General Appearance: Alert, cooperative, no acute distress, appears stated age  Neck: No JVD, no adenopathy, trachea midline  Lungs: Clear to auscultation bilaterally, respirations unlabored  Heart: Regular rate and rhythm, S1 and S2 normal, no murmur, rub or gallop  Abdomen: Soft, non-tender, bowel sounds hypoactiveactive all four quadrants, no masses, no organomegaly  Extremities: Atraumatic, no cyanosis or edema, capillary refill <3 sec.    Pulses: 2+ and symmetric all extremities  Skin: Skin color, texture, turgor normal for ethnicity, no rashes or lesions, warm and dry  Neurologic: Generalized weakness, weakness to LUE-able to forearm, but unable to elevate elbow to gravity, no numbness/tingling     XR CHEST AP PORTABLE    Result Date: 4/7/2025  CONCLUSION:  Increased left pleural effusion and decreased aeration of left lung.        Result Date: 4/7/2025  CONCLUSION:  Enlarging left pleural effusion which is moderate in size with atelectasis and/or pneumonia within the left lung.        Assessment/Plan:  Type B dissection s/p endovascular repair on 4/2, now s/p thoracic stent graft 4/7  Hx Type A aortic dissection s/p repair November 2024  Acute hypoxic respiratory failure 2/2 aspiration pna  Ileus  Hx HTN  Hx HLD  CKD  Leukocytosis        Neuro/Psych: No encephalopathy.     Cardiovascular:  Type B dissection s/p endovascular repair on 4/2, now s/p thoracic stent graft 4/7  Hx Type A aortic dissection s/p repair November 2024  -Vascular surg following  -SBP goal , currently off vasopressors and drips  -Left neck drain per vasc surg  -Plavix     Hx HTN  Hx HLD     Pulmonary:  Acute hypoxic respiratory failure 2/2 aspiration pna  -CXR with left pleural effusion, repeat CXR today  -currently on room air  -Encourage IS  -Zosyn (4/6- )     GI:  Ileus  -Scheduled and PRN bowel  regimen  -CLD as tolerated     Renal/Metabolic:  CKD  -Creat 1.17, baseline appears to be 1.3-1.5  -Monitor I/O     Heme: No anemia or thrombocytopenia     ID:  Leukocytosis  -WBC down to 12.9 today  -Low grade temp  -Zosyn     Endocrine: No history of diabetes.        ICU checklist  Feeding: CLD  Analgesia: PRN norco  Sedation: n/a  Thromboembolism PPX: Lovenox, SCD  Stress Ulcer PPX: n/a  Glucose control: n/a  Bowel Regimen: scheduled and PRN bowel regimen  Lines/drains/tubes: PIV, SERVANDO drain to left neck, CVC  Deescalation of antibiotics: Zosyn (4/6- )  Therapies: PT/OT  Dispo: ICU, transfer out of ICU tomorrow if okay with vasc surg       Blood Transfusion Record       Product Unit Status Volume Start End            Transfuse RBC       25  [id number]  [id number] Completed 04/08/25 0158 500 mL 04/07/25 1606 04/07/25 2200       25  [id number]  [id number] Completed 04/02/25 2105 350 mL 04/02/25 1848 04/02/25 1858       25  [id number]  [id number] Completed 04/02/25 2105 350 mL 04/02/25 1821 04/02/25 1831       25  [id number]  [id number] Completed 04/02/25 2105 350 mL 04/02/25 1804 04/02/25 1814       25  [id number]  [id number] Completed 04/02/25 2105 350 mL 04/02/25 1554 04/02/25 1604

## 2025-04-08 NOTE — PLAN OF CARE
Assumed care of patient at shift change when pt returned from OR around 1930. Pt had increased WOB and SOB. APRN called to bedside. Placed on Vapotherm. Chest Xray ordered an obtained. Low dose precedex gtt started. Restlessness and WOB progressively resolved. Remained flat after return from OR. Pt weaned to RA in AM. Webb and Art line in place. Adequate UOP. Webb removed in AM. BPs stable without intervention overnight. Gwen removed in AM. L drain in place. Little output from drain. Dressing saturated and changed. Surgical groin sited C/D/I with no signs of hematomas. Afebrile. NSR on tele. Denies pain at rest. Ambulated to restroom and chair in AM. See MAR and flowsheets for additional information.

## 2025-04-08 NOTE — OCCUPATIONAL THERAPY NOTE
OCCUPATIONAL THERAPY TREATMENT NOTE - INPATIENT     Room Number: 474/474-A  Session: 2   Number of Visits to Meet Established Goals: 5    Presenting Problem: s/p endovascular aortic arch repair 4/2    ASSESSMENT   Patient demonstrates good  progress this session, goals remain in progress.    Patient continues to function below baseline with toileting, bathing, upper body dressing, lower body dressing, grooming, bed mobility, transfers, static standing balance, dynamic standing balance, functional standing tolerance, and energy conservation strategies.   Contributing factors to remaining limitations include decreased functional strength, decreased functional reach, decreased endurance, impaired standing balance, decreased muscular endurance, and limited L shoulder ROM.  Next session anticipate patient to progress toileting, lower body dressing, grooming, bed mobility, transfers, and functional standing tolerance.  Occupational Therapy will continue to follow patient for duration of hospitalization.    Patient continues to benefit from continued skilled OT services: to facilitate return to prior level of function as patient demonstrates high motivation with excellent tolerance to an intensive therapy program.     History:   Patient is a 59 year old female admitted on 3/30/2025 with Presenting Problem: s/p endovascular aortic arch repair 4/2. Co-Morbidities : CKD, previous aneurysm repair, DM, HTN, HL, obesity     Patient is a 59 year old female admitted on 3/30/2025 from home for several week c/o of chest and subscapular pain.  Pt diagnosed with aortic arch aneurysm.  Pt is now s/p LEFT CAROTID TO SUBCLAVIAN TRANSPOSITION, ENDOVASCULAR ARCH REPAIR. STENTING OF THE LEFT RENAL, LEFT ILIAC - per chart on 4/2     WEIGHT BEARING RESTRICTION       Recommendations for nursing staff:   Transfers: 1 person , RW  Toileting location: bathroom     TREATMENT SESSION:  Patient Start of Session: chair    FUNCTIONAL TRANSFER  ASSESSMENT  Sit to Stand: Chair  Edge of Bed: Contact Guard Assist  Chair: Contact Guard Assist    BED MOBILITY  Supine to Sit : Stand-by Assist    BALANCE ASSESSMENT  Static Sitting: Supervision  Static Standing: Stand-by Assist    FUNCTIONAL ADL ASSESSMENT  Eating: Modified Independent (increased time for opening packages)  LB Dressing Seated: Moderate Assist  LB Dressing Standing: Minimal Assist  Toileting Seated: Stand-by Assist  Toileting Standing: Moderate Assist    ACTIVITY TOLERANCE: denied dizziness               O2 SATURATIONS     EDUCATION PROVIDED  Patient Education : Plan of Care; Functional Transfer Techniques; Proper Body Mechanics; Abdominal Protective Strategies; Energy Conservation; Other (adaptive dressing strategies)  Patient's Response to Education: Verbalized Understanding; Returned Demonstration    Equipment used: rw, gait belt  Demonstrates functional use, Would benefit from additional trial yes     Exercises:    Exercises Repetitions Comments   Scapular elevation     Scapular retraction     Shoulder rolls     Shoulder flexion     Shoulder abduction     Shoulder internal/external rotation 5    Forward punch     Elbow flexion 10    Elbow extension 10    Forearm pronation/supination     Wrist flexion/extension     Gross grasp/fist pumps     Ankle pumps     Knee extension     Marching       UPPER EXTREMITY  ROM: within functional limits except L shoulder is impaired. Slightly increased L shoulder flexion   Strength: is within functional limits except for the following;  Left Shoulder flexion  2/5    Therapist comments: OT educated patient on seated UE AROM therex and re-assessed L shoulder. Patient noted to have increased alertness and voluntary active UE movements, along with improved functional use of BUEs since 4/7.  L shoulder weakness still present. Patient able to thread Depends over RLE in figure four position with some effort; OT assisted her with threading over LLE to minimize strain.  Patient cued to attempt pulling up over hips in standing. She was able to do 90% of this task without assist. Functional mobility completed in preparation for toileting and household management with CGA and use of RW. Patient needed verbal cues for reverse logroll in to bed and MIN A for LLE into supine.   Patient expressing wish to go home , however admits that her LUE is still impaired.  She would need assistance for some aspects of ADLs, and IADLs at this time.      Patient End of Session: Hospital anti-slip socks, All patient questions and concerns addressed, RN aware of session/findings, Call light within reach, Needs met, In bed, Other (Comment) (friend present)    SUBJECTIVE  \"I don't want that, it makes me look like a disabled person!\"  ( Dressing stick )    \"I want to go home, not a facility. My friends were thinking that I should go.\"  ( To rehab)    PAIN ASSESSMENT  Rating: Other (Comment) (dened pain)  Location: denied pain  Management Techniques: Nurse notified, Relaxation     OBJECTIVE  Precautions: Drain(s)    AM-PAC ‘6-Clicks’ Inpatient Daily Activity Short Form  -   Putting on and taking off regular lower body clothing?: A Lot  -   Bathing (including washing, rinsing, drying)?: A Lot  -   Toileting, which includes using toilet, bedpan or urinal? : A Little  -   Putting on and taking off regular upper body clothing?: A Little  -   Taking care of personal grooming such as brushing teeth?: A Little  -   Eating meals?: None    AM-PAC Score:  Score: 17  Approx Degree of Impairment: 50.11%  Standardized Score (AM-PAC Scale): 37.26    PLAN     OT Treatment Plan: Balance activities, ADL training, Functional transfer training, Patient/Family education, Patient/Family training, Compensatory technique education, Energy conservation/work simplification techniques, UE strengthening/ROM, Endurance training, Equipment eval/education, Fine motor coordination activities, Neuromuscluar reeducation  Rehab Potential :  Good  Frequency: 3-5x/week    OT Goals: ongoing 4/8/25  ADL Goals   Patient will perform grooming: with modified independent  Patient will perform lower body dressing:  with modified independent  Patient will perform toileting: with modified independent     Functional Transfer Goals  Patient will transfer to toilet:  with modified independent     UE Exercise Program Goal  Patient will be supervision with left AROM/ROMMEL HEP (home exercise program).     New Goal 4/7/25:  PHQ9 screening    OT Session Time: 24 minutes  Self-Care Home Management: 6 minutes  Therapeutic Activity: 18 minutes

## 2025-04-08 NOTE — CM/SW NOTE
Met w/ pt to follow up on DC planning. Dr. Brito saw pt for consult today and current recommendation for therapy is for acute inpatient rehab. Pt previously had recommendations for HH services, which she was agreeable to.     Meeting was brief, as echo tech was at bedside and needed to start exam. Pt voiced that she would prefer to DC home rather than inpatient rehab. Pt has list of available C providers and would need to select HH provider for DC. Pt informed that therapy services would be limited in comparison to acute rehab. Pt could also be appropriate for a day rehab program if she would be agreeable.     CM will endorse to CM/SW for follow up.     CM/SW will remain available for DC planning and/or support.     TRISTAN MonsivaisN, CMSRN    [id number]

## 2025-04-08 NOTE — CONSULTS
.[facility]    Alisha Wilde Patient Status:  Inpatient    10/25/1965 MRN [id number]   Location [facility] 4SW-A Attending Jade Cameron MD   Hosp Day # 9 PCP Bridger Avendano MD     Patient Identification  Alisha Wilde is a 59 year old female.  :  10/25/1965  Admit Date:  3/30/2025  Attending Provider:  Jade Cameron MD                                  Primary Care Physician:  Bridger Avendano MD   Admitting Diagnosis: Dissection of aorta [I71.0]    Subjective:      Reason for Consultation: Impaired ADL and mobility dysfuction due to Type B dissection s/p endovascular repair on , now s/p thoracic stent graft      History of present illness:  Records reviewed, and patient examined.Consult Requested by:     Alisha Wilde is a 59 year old female with history of hypertension, hyperlipidemia, CKD stage III, TANYA, prior type a aortic dissection s/p repair on 2024 with prolonged respiratory failure s/p trach and PEG who initially presented to [place] ED on 3/30 with back pain/chest pain that seem to radiate through her chest wall.  Pain was located midsternal.  In the ED, CT scan of chest showed type B aortic dissection.  Admitted for workup.  Vascular surgery consulted. History of tracheostomy and PEG tube now removed.  Per vascular surgery consult, there was aneurysmal degeneration of type B dissection with also worsening dissection of left renal artery.      Patient was transferred to [facility] for surgical invention on 3/30/25.    On , patient underwent left carotid to subclavian transposition, endovascular arch repair with complete exclusion of aneurysm and resolution of dissection, stenting of left renal and bilateral liac veins with neuromonitoring by  and .      Repeat CTA  with some dissection within left subclavian artery and persistent flap with suboptimal expansion of the stent graft, plan for intervention  with vascular surgery for thoracic  stent extension with left subclavian stent through left radial access.     Post-op course complicated by Acute hypoxic respiratory failure 2/2 aspiration pna  -CXR with left pleural effusion, repeat CXR today 4/8/25  CONCLUSION:    1. Persistent opacity left lung base consistent with left effusion and atelectasis.   2. Endograft is unchanged.       Remains in ICU.  On CPAP/BiPAP/Vapotherm    On po diet.  Webb draining clear urine.        Physiatry consult obtained now to assess pt's funtional status and make appropriate recommendations.      HOME SITUATION  Type of Home: Condo  Home Layout: One level  Stairs to Enter : 0        Stairs to Bedroom: 0         Lives With: Alone    Drives: Yes           Prior Level of [place]: Pt is typically independent w/ adl's, gait w/ cane, drives and is currently on short term disability w/ [employer].    Current Functional Status:     Bed Mobility:  Rolling: R from sidelying w/ bed flat- mod I      Supine<>Sit: NT            Sit<>Supine: Reviewed extensively log rolling technique to assist w/ protection of trunk and safe transfers. Min a of 1 for L le only when returning to supine using log rolling technique.  Pt required a minute to recover respiratory rate after returning to supine - mildly fatigued w/ the activity.                  Transfer Mobility:  Sit<>Stand: Performed 2x over course of session.  CGA from chair, better able to engage L ue w/ standing today.  Once in standing was able to maintain balance while pulling up her new brief.   Stand<>Sit: CGA, cues for safety technique.   Gait: Pt completed gait 100'x1 w/ rolling walker and min a of 1 due to increased unsteadiness toward end of gait as she fatigued.  Pt did have one standing rest.  Pt is on room air today.     Past Medical History:  @Medical hx@  Past Medical History:    Chronic kidney disease (CKD)    Esophageal reflux    High blood pressure    High cholesterol    HYPERTENSION    Hypertension    Unspecified  essential hypertension       Past Surgical History:   Procedure Laterality Date    Anesth,open heart surgery  2024    Colonoscopy N/A 2018    Procedure: COLONOSCOPY;  Surgeon: Magdy Webber MD;  Location: [facility] ENDOSCOPY    Endometrial ablation      child passed away for 5 mins          1    Other surgical history  2011    cysto-Dr. Lacey -- pt denies        [COMPLETED] potassium chloride 20 mEq/100mL IVPB premix 20 mEq  20 mEq Intravenous Once    iodixanol (VISIPAQUE) 320 MG/ML injection 100 mL  100 mL Injection ONCE PRN    [COMPLETED] sodium chloride 0.9% infusion   Intravenous Once    [] ondansetron (Zofran) 4 MG/2ML injection 4 mg  4 mg Intravenous PRN    [] metoclopramide (Reglan) 5 mg/mL injection 5 mg  5 mg Intravenous PRN    [] dexamethasone (Decadron) 4 MG/ML injection 4 mg  4 mg Intravenous PRN    [] trimethobenzamide (Tigan) 100 mg/mL injection 200 mg  200 mg Intramuscular PRN    [] HYDROmorphone (Dilaudid) 1 MG/ML injection 0.4 mg  0.4 mg Intravenous Q5 Min PRN    [] HYDROcodone-acetaminophen (Norco) 5-325 MG per tab 1 tablet  1 tablet Oral Once PRN    Or    [] HYDROcodone-acetaminophen (Norco) 5-325 MG per tab 2 tablet  2 tablet Oral Once PRN    [] atropine 0.1 MG/ML injection 0.5 mg  0.5 mg Intravenous PRN    [] naloxone (Narcan) 0.4 MG/ML injection 0.08 mg  0.08 mg Intravenous PRN    [] diphenhydrAMINE (Benadryl) 50 mg/mL  injection 12.5 mg  12.5 mg Intravenous PRN    acetaminophen (Tylenol) tab 650 mg  650 mg Oral Q4H PRN    Or    HYDROcodone-acetaminophen (Norco) 5-325 MG per tab 1 tablet  1 tablet Oral Q4H PRN    Or    HYDROcodone-acetaminophen (Norco) 5-325 MG per tab 2 tablet  2 tablet Oral Q4H PRN    ondansetron (Zofran) 4 MG/2ML injection 4 mg  4 mg Intravenous Q6H PRN    prochlorperazine (Compazine) 10 MG/2ML injection 5 mg  5 mg Intravenous Q8H PRN    enoxaparin (Lovenox) 40 MG/0.4ML SUBQ  injection 40 mg  40 mg Subcutaneous Daily    metoprolol (Lopressor) 5 mg/5mL injection 2.5 mg  2.5 mg Intravenous Q20 Min PRN    nitroGLYCERIN in dextrose 5% 50 mg/250mL infusion premix  5-400 mcg/min Intravenous Continuous PRN    niCARdipine in sodium chloride 0.86% (carDENE) 20 mg/200mL infusion premix  5-15 mg/hr Intravenous Continuous PRN    phenylephrine in sodium chloride 0.9% (Prince-Synephrine) 50 mg/250mL infusion premix   mcg/min Intravenous Continuous PRN    norepinephrine (Levophed) 4 mg/250mL infusion premix  0.5-30 mcg/min Intravenous Continuous PRN    clopidogrel (Plavix) tab 75 mg  75 mg Oral Daily    [COMPLETED] potassium phosphate dibasic 15 mmol in sodium chloride 0.9% 250 mL IVPB  15 mmol Intravenous Once    [COMPLETED] potassium chloride 40 mEq in 250mL sodium chloride 0.9% IVPB premix  40 mEq Intravenous Once    piperacillin-tazobactam (Zosyn) 4.5 g in dextrose 5% 100 mL IVPB-ADDV  4.5 g Intravenous Q8H    [COMPLETED] potassium chloride 40 mEq in 250mL sodium chloride 0.9% IVPB premix  40 mEq Intravenous Once    [COMPLETED] iopamidol 76% (ISOVUE-370) injection for power injector  100 mL Intravenous ONCE PRN    ipratropium-albuterol (Duoneb) 0.5-2.5 (3) MG/3ML inhalation solution 3 mL  3 mL Nebulization Q6H WA    pantoprazole (Protonix) 40 mg in sodium chloride 0.9% PF 10 mL IV push  40 mg Intravenous Q24H    [COMPLETED] heparin (Porcine) 1000 UNIT/ML injection - BOLUS IV 5,000 Units  5,000 Units Intravenous Once    [COMPLETED] heparin (Porcine) 15101 units/250 mL infusion (ACS/AFIB) INITIAL DOSE  1,000 Units/hr Intravenous Once    bisacodyl (Dulcolax) 10 MG rectal suppository 10 mg  10 mg Rectal Daily PRN    multivitamin (Tab-A-Treasure/Beta Carotene) tab 1 tablet  1 tablet Oral Daily    simethicone (Mylicon) chewable tab 160 mg  160 mg Oral QID PRN    [COMPLETED] potassium chloride 20 mEq/100mL IVPB premix 20 mEq  20 mEq Intravenous Once    docusate sodium (Colace) cap 100 mg  100 mg Oral BID     polyethylene glycol (PEG 3350) (Miralax) 17 g oral packet 17 g  17 g Oral Daily    [COMPLETED] bisacodyl (Dulcolax) 10 MG rectal suppository 10 mg  10 mg Rectal Once    [COMPLETED] furosemide (Lasix) 10 mg/mL injection 40 mg  40 mg Intravenous Once    [COMPLETED] verapamil (Isoptin) 2.5 mg/mL injection        [COMPLETED] Nitroglycerin in D5W 200-5 MCG/ML-% injection        [] lactated ringers IV bolus 500 mL  500 mL Intravenous Once PRN    [] atropine 0.1 MG/ML injection 0.5 mg  0.5 mg Intravenous PRN    [] naloxone (Narcan) 0.4 MG/ML injection 0.08 mg  0.08 mg Intravenous PRN    [] fentaNYL (Sublimaze) 50 mcg/mL injection 25 mcg  25 mcg Intravenous Q5 Min PRN    Or    [] fentaNYL (Sublimaze) 50 mcg/mL injection 50 mcg  50 mcg Intravenous Q5 Min PRN    [] HYDROmorphone (Dilaudid) 1 MG/ML injection 0.2 mg  0.2 mg Intravenous Q5 Min PRN    Or    [] HYDROmorphone (Dilaudid) 1 MG/ML injection 0.4 mg  0.4 mg Intravenous Q5 Min PRN    Or    [] HYDROmorphone (Dilaudid) 1 MG/ML injection 0.6 mg  0.6 mg Intravenous Q5 Min PRN    [COMPLETED] iodixanol (VISIPAQUE) 320 MG/ML injection 150 mL  150 mL Injection ONCE PRN    [COMPLETED] ceFAZolin (Ancef) 2g in 10mL IV syringe premix  2 g Intravenous Q8H    [COMPLETED] furosemide (Lasix) 10 mg/mL injection 40 mg  40 mg Intravenous Once    [COMPLETED] iopamidol 76% (ISOVUE-370) injection for power injector  100 mL Intravenous ONCE PRN    [COMPLETED] iopamidol 76% (ISOVUE-370) injection for power injector  80 mL Intravenous ONCE PRN    [] nitroGLYCERIN in dextrose 5% 50 mg/250mL infusion premix        [COMPLETED] lidocaine (Xylocaine) 1 % injection  10 mL Intradermal Once    [COMPLETED] potassium phosphate dibasic 15 mmol in sodium chloride 0.9% 250 mL IVPB  15 mmol Intravenous Once    Followed by    [COMPLETED] potassium chloride 20 mEq/100mL IVPB premix 20 mEq  20 mEq Intravenous Once       Social History     Tobacco  Use    Smoking status: Former     Current packs/day: 0.00     Average packs/day: 1 pack/day for 13.0 years (13.0 ttl pk-yrs)     Types: Cigarettes     Start date:      Quit date:      Years since quittin.2    Smokeless tobacco: Former   Substance Use Topics    Alcohol use: Not Currently     Alcohol/week: 1.0 - 2.0 standard drink of alcohol     Types: 1 - 2 Cans of beer per week     Comment: every day  NOT DRINKING FOR LAS 4 MONTHS       Family History   Problem Relation Age of Onset    Hypertension Mother     Heart Disorder Mother 70    Other (Other) Mother         kidney failure    Hypertension Father     Hypertension Maternal Grandfather     Cancer Neg     Diabetes Neg     Glaucoma Neg     Macular degeneration Neg        Allergies:  Allergies[1]        Lab Results   Component Value Date    WBC 12.9 2025    HGB 9.1 2025    HCT 28.2 2025    .0 2025    CREATSERUM 1.17 2025    BUN 11 2025     2025    K 4.3 2025    K 4.3 2025     2025    CO2 25.0 2025     2025    CA 9.3 2025    ALB 3.7 2025    ALKPHO 64 2025    BILT 0.4 2025    TP 6.5 2025    AST 13 2025    ALT <7 2025    PTT 30.0 2025    INR 1.19 2025    PTP 15.2 2025    MG 2.1 2025    PHOS 4.0 2025       Review of Systems:  Complete review of systems completed/ point.  Negative except as that outlined in HPI    OBJECTIVE:    Blood pressure 148/60, pulse 79, temperature 98.9 °F (37.2 °C), temperature source Temporal, resp. rate 23, weight 233 lb 4 oz (105.8 kg), SpO2 92%, not currently breastfeeding.    Intake/Output Summary (Last 24 hours) at 2025 1435  Last data filed at 2025 0700  Gross per 24 hour   Intake 2934.2 ml   Output 4162 ml   Net -1227.8 ml       Physical Exam:                                      General: Alert, cooperative, no distress, appears stated age.  Head:   Normocephalic, without obvious abnormality, atraumatic.   Eyes:  Conjunctivae/lids clear. PERRL, EOMs intact. Vision functional.   Ears/Nose/Throat: Hearing intact. Lips, mucosa, and tongue normal. Teeth and gums normal. Moist mucous membranes.     Neck: No neck masses or thyroid enlargement/tenderness/nodules.       Lungs:   Resonant, clear breath sounds, quiet accessory muscles.   Chest wall:  No tenderness or deformity.   Cardiovascular:  Heart with regular rate rhythm, no murmurs appreciated. Radial and pedal pulses good. No cyanosis in all extremities.   Abdomen:   No tenderness guarding or rigidity. Liver and spleen are not enlarged.  Bowel sounds present.                   Musculoskeletal:     Right Upper Extremity:  Strength is 4.  ROM WNL.   Left Upper Extremity:  Strength is 4.  ROM WNL.   Right Lower Extremity:  Strength  is 3-4.   ROM WNL.   Left Lower Extremity: Strength  is 3-4.   ROM WNL.          Neuro: CNII-XII are grossly intact.                     Psychiatric: Awake, alert and oriented                   Assessment:                                Rehab diagnosis: ADL and  mobility dysfunction due to Type B dissection s/p endovascular repair on 4/2, now s/p thoracic stent graft 4/7     Disposition:     Once medically stable and weaned off Vapotherm, based on pt's current functional and medical status, Acute inpatient rehabilitation is recommended to maximize patient's independence, provide care giver training, and evaluate for home equipment needs with ELOS 2 weeks.     Medical necessity includes  thromboembolic risk, fall risk,  medication management, bleeding risk, wound/incision management,  blood pressure management, prevention of pressure ulcer, prevention of aspiration pneumonia.     Patient would benefit and is able to participate in 3 hours of therapy daily. Patient is anticipated to discharge to home when acute inpatient rehabilitation course is complete.          Thank you for the  consult.     Chelsea Brito MD  [facility].           [1]   Allergies  Allergen Reactions    Atorvastatin MYALGIA    Pravastatin MYALGIA    Rosuvastatin MYALGIA    Seasonal Runny nose

## 2025-04-09 LAB
ALBUMIN SERPL-MCNC: 3.7 G/DL (ref 3.2–4.8)
ALBUMIN/GLOB SERPL: 1.4 {RATIO} (ref 1–2)
ALP LIVER SERPL-CCNC: 58 U/L (ref 50–130)
ALT SERPL-CCNC: <7 U/L (ref 10–49)
ANION GAP SERPL CALC-SCNC: 8 MMOL/L (ref 0–18)
AST SERPL-CCNC: 14 U/L (ref ?–34)
BASOPHILS # BLD AUTO: 0.03 X10(3) UL (ref 0–0.2)
BASOPHILS NFR BLD AUTO: 0.3 %
BILIRUB SERPL-MCNC: 0.4 MG/DL (ref 0.2–1.1)
BUN BLD-MCNC: 8 MG/DL (ref 9–23)
CALCIUM BLD-MCNC: 9.1 MG/DL (ref 8.7–10.6)
CHLORIDE SERPL-SCNC: 106 MMOL/L (ref 98–112)
CO2 SERPL-SCNC: 26 MMOL/L (ref 21–32)
CREAT BLD-MCNC: 1.06 MG/DL (ref 0.55–1.02)
EGFRCR SERPLBLD CKD-EPI 2021: 60 ML/MIN/1.73M2 (ref 60–?)
EOSINOPHIL # BLD AUTO: 0.11 X10(3) UL (ref 0–0.7)
EOSINOPHIL NFR BLD AUTO: 1.1 %
ERYTHROCYTE [DISTWIDTH] IN BLOOD BY AUTOMATED COUNT: 16.7 %
GLOBULIN PLAS-MCNC: 2.7 G/DL (ref 2–3.5)
GLUCOSE BLD-MCNC: 103 MG/DL (ref 70–99)
GLUCOSE BLD-MCNC: 106 MG/DL (ref 70–99)
HCT VFR BLD AUTO: 25.1 % (ref 35–48)
HGB BLD-MCNC: 8.1 G/DL (ref 12–16)
IMM GRANULOCYTES # BLD AUTO: 0.08 X10(3) UL (ref 0–1)
IMM GRANULOCYTES NFR BLD: 0.8 %
LYMPHOCYTES # BLD AUTO: 0.84 X10(3) UL (ref 1–4)
LYMPHOCYTES NFR BLD AUTO: 8.4 %
MAGNESIUM SERPL-MCNC: 2.2 MG/DL (ref 1.6–2.6)
MCH RBC QN AUTO: 25.9 PG (ref 26–34)
MCHC RBC AUTO-ENTMCNC: 32.3 G/DL (ref 31–37)
MCV RBC AUTO: 80.2 FL (ref 80–100)
MONOCYTES # BLD AUTO: 1.25 X10(3) UL (ref 0.1–1)
MONOCYTES NFR BLD AUTO: 12.5 %
NEUTROPHILS # BLD AUTO: 7.7 X10 (3) UL (ref 1.5–7.7)
NEUTROPHILS # BLD AUTO: 7.7 X10(3) UL (ref 1.5–7.7)
NEUTROPHILS NFR BLD AUTO: 76.9 %
OSMOLALITY SERPL CALC.SUM OF ELEC: 289 MOSM/KG (ref 275–295)
PHOSPHATE SERPL-MCNC: 2.5 MG/DL (ref 2.4–5.1)
PLATELET # BLD AUTO: 227 10(3)UL (ref 150–450)
POTASSIUM SERPL-SCNC: 3.6 MMOL/L (ref 3.5–5.1)
PROT SERPL-MCNC: 6.4 G/DL (ref 5.7–8.2)
RBC # BLD AUTO: 3.13 X10(6)UL (ref 3.8–5.3)
SODIUM SERPL-SCNC: 140 MMOL/L (ref 136–145)
WBC # BLD AUTO: 10 X10(3) UL (ref 4–11)

## 2025-04-09 PROCEDURE — 99232 SBSQ HOSP IP/OBS MODERATE 35: CPT | Performed by: STUDENT IN AN ORGANIZED HEALTH CARE EDUCATION/TRAINING PROGRAM

## 2025-04-09 PROCEDURE — 99232 SBSQ HOSP IP/OBS MODERATE 35: CPT | Performed by: NURSE PRACTITIONER

## 2025-04-09 RX ORDER — POTASSIUM CHLORIDE 14.9 MG/ML
20 INJECTION INTRAVENOUS ONCE
Status: COMPLETED | OUTPATIENT
Start: 2025-04-09 | End: 2025-04-09

## 2025-04-09 RX ORDER — HYDRALAZINE HYDROCHLORIDE 20 MG/ML
5 INJECTION INTRAMUSCULAR; INTRAVENOUS EVERY 4 HOURS PRN
Status: DISCONTINUED | OUTPATIENT
Start: 2025-04-09 | End: 2025-04-10

## 2025-04-09 RX ORDER — LABETALOL HYDROCHLORIDE 5 MG/ML
10 INJECTION, SOLUTION INTRAVENOUS EVERY 4 HOURS PRN
Status: DISCONTINUED | OUTPATIENT
Start: 2025-04-09 | End: 2025-04-10

## 2025-04-09 NOTE — CM/SW NOTE
Met w/pt to follow up on discharge planning.  She is now agreeable to Acute Rehab, pending her progress in the hospital.  She still wants to go home, but confirms she does not have anyone who can stay with her and is not able to afford caregivers.    She is agreeable to going to [facility] and not interested in other options.    Discusses w/[facility] liaison Toma and she will initiate insurance auth    / to remain available for support and/or discharge planning.     Koki GONZALEZA MSN, RN Case Manager  [phone]

## 2025-04-09 NOTE — PROGRESS NOTES
[facility]   part of [facility]     Vascular Surgery Progress Note    Alisha Wilde Patient Status:  Inpatient    10/25/1965 MRN [id number]   Location [facility] 4SW-A Attending Danette Atwood, DO   Hosp Day # 10 PCP Bridger Avendano MD     Objective:   Temp: 98 °F (36.7 °C)  Pulse: 74  Resp: 17  BP: 144/68      Events Yesterday/Overnight:  Patient is a 59 year old female, POD 2 thoracic stent graft with balloon angioplasty and stent of the right external iliac artery with Dr. Holder. Patient previously underwent a left carotid to subclavian transposition, endovascular arch repair, stenting of the left renal and iliac arteries with Dr. Holder on . Patient is doing well./68. Hemoglobin 8.1. WBC 10.0. patient is up in the chair this morning. Patient notes pain in the left bicep.      Exam:  Left SERVANDO drain to suction, serosanguineous drainage present. Bilateral groins soft, no hematoma, no drainage present.  Palpable bilateral DP pulses. Audible DP and PT signals. Neurologically intact.  Patient able to move the bilateral lower extremities. Increased range of motion in the left upper and lower extremity compared to the day prior. Palpable bilateral radial pulses.     Impression/Plan:    Discontinue central line.    Physical therapy.    Patient can transfer to telemetry.       Medications:  Current Hospital Medications[1]    Laboratory/Data:    LABS:  Recent Labs   Lab 25  0411 25  0644 25  0612 25  1203 25  2019 25  0441 25  0425   WBC 13.6*   < > 13.9* 14.1* 17.1* 12.9* 10.0   HGB 12.1   < > 8.0* 8.0* 9.7* 9.1* 8.1*   MCV 77.2*   < > 79.4* 79.6* 79.2* 79.7* 80.2   .0   < > 172.0  172.0 179.0 176.0 182.0 227.0   INR 1.18  --   --   --  1.19  --   --     < > = values in this interval not displayed.       Recent Labs   Lab 25  0341 25  0612 25  2019 25  0441 25  0425    138 135* 139 140   K 3.4*  3.4* 3.8  3.8 3.8  4.3  4.3 3.6    102 102 107 106   CO2 26.0 29.0 23.0 25.0 26.0   BUN 9 9 9 11 8*   CREATSERUM 1.07* 1.08* 1.07* 1.17* 1.06*   GLU 94 109* 124* 145* 106*   CA 8.9 8.8 8.9 9.3 9.1   MG  --   --  2.0 2.1 2.2   PHOS  --   --  2.4 4.0 2.5     Recent Labs   Lab 04/03/25  0411 04/05/25  1440 04/07/25  0612 04/07/25  1314 04/07/25 2019   PTP 15.1*  --   --   --  15.2*   INR 1.18  --   --   --  1.19   PTT 27.6   < > 41.9* 45.8* 30.0    < > = values in this interval not displayed.     Recent Labs   Lab 04/06/25  0341 04/07/25  0612 04/07/25 2019 04/08/25  0441 04/09/25  0425   ALT <7* <7* <7* <7* <7*   AST 9 12 15 13 14   ALB 3.6 3.5 3.7 3.7 3.7     No results for input(s): \"TROP\" in the last 168 hours.  Lab Results   Component Value Date    ANASCRN Negative 06/05/2021     No results for input(s): \"PCACT\", \"PSACT\", \"AT3ACT\", \"HIPAB\", \"PATHI\", \"STALA\", \"DRVVTRATIO\", \"DRVVT\", \"STACLOT\", \"CARIGG\", \"F9NW8KOHZX\", \"V2OE5ONAXD\", \"RA\", \"HAVIGM\", \"HBCIGM\", \"HCVAB\", \"HBSAG\", \"HBCAB\", \"HBVDNAINTERP\", \"ANAS\", \"C3\", \"C4\" in the last 168 hours.    Helen Moses, STACEY  4/9/2025  8:50 AM         [1]   Current Facility-Administered Medications   Medication Dose Route Frequency    potassium phosphate dibasic 15 mmol in sodium chloride 0.9% 250 mL IVPB  15 mmol Intravenous Once    Followed by    potassium chloride 20 mEq/100mL IVPB premix 20 mEq  20 mEq Intravenous Once    piperacillin-tazobactam (Zosyn) 3.375 g in dextrose 5% 100 mL IVPB-ADDV  3.375 g Intravenous Q8H    melatonin tab 3 mg  3 mg Oral Nightly    iodixanol (VISIPAQUE) 320 MG/ML injection 100 mL  100 mL Injection ONCE PRN    acetaminophen (Tylenol) tab 650 mg  650 mg Oral Q4H PRN    Or    HYDROcodone-acetaminophen (Norco) 5-325 MG per tab 1 tablet  1 tablet Oral Q4H PRN    Or    HYDROcodone-acetaminophen (Norco) 5-325 MG per tab 2 tablet  2 tablet Oral Q4H PRN    ondansetron (Zofran) 4 MG/2ML injection 4 mg  4 mg Intravenous Q6H PRN    prochlorperazine (Compazine) 10  MG/2ML injection 5 mg  5 mg Intravenous Q8H PRN    enoxaparin (Lovenox) 40 MG/0.4ML SUBQ injection 40 mg  40 mg Subcutaneous Daily    metoprolol (Lopressor) 5 mg/5mL injection 2.5 mg  2.5 mg Intravenous Q20 Min PRN    nitroGLYCERIN in dextrose 5% 50 mg/250mL infusion premix  5-400 mcg/min Intravenous Continuous PRN    niCARdipine in sodium chloride 0.86% (carDENE) 20 mg/200mL infusion premix  5-15 mg/hr Intravenous Continuous PRN    phenylephrine in sodium chloride 0.9% (Prince-Synephrine) 50 mg/250mL infusion premix   mcg/min Intravenous Continuous PRN    clopidogrel (Plavix) tab 75 mg  75 mg Oral Daily    ipratropium-albuterol (Duoneb) 0.5-2.5 (3) MG/3ML inhalation solution 3 mL  3 mL Nebulization Q6H WA    pantoprazole (Protonix) 40 mg in sodium chloride 0.9% PF 10 mL IV push  40 mg Intravenous Q24H    bisacodyl (Dulcolax) 10 MG rectal suppository 10 mg  10 mg Rectal Daily PRN    multivitamin (Tab-A-Treasure/Beta Carotene) tab 1 tablet  1 tablet Oral Daily    simethicone (Mylicon) chewable tab 160 mg  160 mg Oral QID PRN    docusate sodium (Colace) cap 100 mg  100 mg Oral BID    polyethylene glycol (PEG 3350) (Miralax) 17 g oral packet 17 g  17 g Oral Daily

## 2025-04-09 NOTE — DIETARY MALNUTRITION NOTE
[facility]   part of [facility]  NUTRITION ASSESSMENT    Pt meets moderate malnutrition criteria at this time.    CRITERIA FOR MALNUTRITION DIAGNOSIS:  Criteria for non-severe malnutrition diagnosis: chronic illness related to wt loss 7.5% in 3 months and energy intake less than75% for greater than 1 month    NUTRITION INTERVENTION:    RD nutrition Care Plan- Initiated ONS (oral nutritional supplements) and Ordered multivitamin  Meal and Snacks - Continue 2 gm Na diet as tolerated; monitor patient po intake. Encourage adequate po of appropriate diet.  Medical Food Supplements - Decrease to Ensure Plus HP strawberry daily. Rationale/use for oral supplements discussed.  Vitamin and Mineral Supplements - Continue Multivitamin with minerals    PATIENT STATUS: 4/9: Pt had episode of aspiration 4/5 in CT but improved quickly. Went ot OR 4/7 for thoracic stent graft, balloon angioplasty and R external iliac artery stent. Visited pt at bedside. Pt reports her appetite remains decreased and hasn't been eating much. Hasn't been consistently drinking Ensure d/t not really liking it. Offered her other options but she declined. Discussed importance of protein and continued to encourage at least one Ensure daily. Pt denies GI symptoms at this time with last BM 4/9. All questions answered at this time.    4/4: 59 year old female admitted on 3/30 presents with type B aortic dissection s/p left carotid to subclavian transposition, endovascular arch repair, stenting of the left renal and iliac arteries on 4/2. Pt screened d/t LOS. Visited pt at bedside. Pt reports her appetite is \"not that great\" which has been ongoing since her first vascular surgery in November. She reports having upset stomach/constipation at baseline and takes dulcolax; last BM today. No chewing or swallowing difficulties and NKFA. Reports her UBW was ~240 lbs back in November and reports her most recent wt is ~220 lbs. Reports drinking 1 Ensure  yesterday and agreeable to continue strawberry flavor. Continued to encourage PO and ONS intake; all questions answered at this time.    PMH: CKD, Esophageal reflux, High cholesterol, Hypertension    ANTHROPOMETRICS:  Ht:  5'5\"  Wt: 100.6 kg (221 lb 12.5 oz).   BMI: Body mass index is 36.91 kg/m².  IBW: 56.8 kg    WEIGHT HISTORY: Using admit wt, pt with ~18 lb wt loss x 3 months (7.5%, significant per standards).  Patient Weight(s) for the past 336 hrs:   Weight   04/09/25 0030 100.6 kg (221 lb 12.5 oz)   04/08/25 0300 105.8 kg (233 lb 4 oz)   04/06/25 0500 107.2 kg (236 lb 5.3 oz)   04/05/25 0400 112 kg (246 lb 14.6 oz)   04/04/25 0300 113.4 kg (250 lb)   03/31/25 0400 103.3 kg (227 lb 11.8 oz)   03/30/25 1654 100.8 kg (222 lb 3.6 oz)   03/30/25 1508 100.8 kg (222 lb 3.6 oz)       Wt Readings from Last 10 Encounters:   04/09/25 100.6 kg (221 lb 12.5 oz)   03/30/25 95.3 kg (210 lb)   03/20/25 104.3 kg (230 lb)   03/06/25 104.3 kg (230 lb)   02/03/25 105.2 kg (232 lb)   01/31/25 109 kg (240 lb 3.2 oz)   12/23/24 132.8 kg (292 lb 12.3 oz)   10/24/24 115.2 kg (254 lb)   05/17/24 112.9 kg (249 lb)   02/22/24 112.9 kg (249 lb)        NUTRITION:  Diet:       Procedures    Regular/General diet Sodium Restriction: 2 GM NA; Is Patient on Accuchecks? No      Food Allergies: No  Cultural/Ethnic/Restorationist Preferences Addressed: Yes    Percent Meals Eaten (last 3 days)       Date/Time Percent Meals Eaten (%)    04/07/25 2314 50 %    04/08/25 1200 75 %            GI SYSTEM REVIEW: WNL; last BM 4/9  Skin/Wounds: WNL    NUTRITION RELATED PHYSICAL FINDINGS:     1. Body Fat/Muscle Mass: no wasting noted      2. Fluid Accumulation: upper extremity edema non-pitting, hand edema non-pitting, and lower extremity edema non-pitting    NUTRITION PRESCRIPTION:  56.8 kg IBW  Calories: 9128-3884 calories/day (25-30 kcal/kg)  Protein:  grams protein/day (1.5-2.0 gm/kg)  Fluid: ~1 ml/kcal or per MD discretion    NUTRITION  DIAGNOSIS/PROBLEM:  Malnutrition related to insufficient appetite resulting in inadequate nutrition intake as evidenced by documented/reported insufficient oral intake and documented/reported unintentional weight loss    MONITOR AND EVALUATE/NUTRITION GOALS:  PO intake of 75% of meals TID - Not met, Continues  PO intake of 75% of oral nutrition supplement/s - Not met, Continues  Weight stable within 1 to 2 lbs during admission - Ongoing      MEDICATIONS:  Colace, multivitamin, protonix, abx, miralax    LABS:  Reviewed     Pt is at Moderate nutrition risk    Carola Bruno RD, LDN, CNSC  Clinical Dietitian  Spectra: [phone]

## 2025-04-09 NOTE — PLAN OF CARE
Assumed care of patient at change of shift. Patient moved to chair in morning, removed central line, Vascular MD removed SERVANDO drain, Peripheral IV inserted.     Called report to RN at 1525.     Patient transferred to [facility] with transport. All of patient belongings sent with patient.

## 2025-04-09 NOTE — PLAN OF CARE
Assumed care following change of shift. A&O x 4, baseline weakness left side. CPAP at night. NSR, VSS. Ambulating x1 w/ walker to bathroom. Pain controlled throughout shift (see MAR). Pt updated on plan of care, see MAR and flowsheets.

## 2025-04-09 NOTE — OCCUPATIONAL THERAPY NOTE
OCCUPATIONAL THERAPY TREATMENT NOTE - INPATIENT     Room Number: 474/474-A  Session: 3   Number of Visits to Meet Established Goals: 5    Presenting Problem: s/p endovascular aortic arch repair 4/2    ASSESSMENT   Patient demonstrates fair progress this session, goals remain in progress.    Patient continues to function below baseline with toileting, bathing, upper body dressing, lower body dressing, grooming, bed mobility, transfers, static standing balance, dynamic standing balance, functional standing tolerance, and performing household tasks.   Contributing factors to remaining limitations include decreased functional strength, decreased functional reach, decreased endurance, pain, impaired standing balance, and decreased muscular endurance.  Next session anticipate patient to progress toileting, lower body dressing, grooming, and transfers.  Occupational Therapy will continue to follow patient for duration of hospitalization.    Patient continues to benefit from continued skilled OT services: to facilitate return to prior level of function as patient demonstrates high motivation with excellent tolerance to an intensive therapy program.     History:   Patient is a 59 year old female admitted on 3/30/2025 with Presenting Problem: s/p endovascular aortic arch repair 4/2. Co-Morbidities : CKD, previous aneurysm repair, DM, HTN, HL, obesity     Patient is a 59 year old female admitted on 3/30/2025 from home for several week c/o of chest and subscapular pain.  Pt diagnosed with aortic arch aneurysm.  Pt is now s/p LEFT CAROTID TO SUBCLAVIAN TRANSPOSITION, ENDOVASCULAR ARCH REPAIR. STENTING OF THE LEFT RENAL, LEFT ILIAC - per chart on 4/2     WEIGHT BEARING RESTRICTION       Recommendations for nursing staff:   Transfers: 1 person RW  Toileting location: bathroom    TREATMENT SESSION:  Patient Start of Session:     FUNCTIONAL TRANSFER ASSESSMENT  Sit to Stand: Chair  Edge of Bed: Contact Guard Assist  Chair: Contact  Guard Assist    BED MOBILITY  Supine to Sit : Stand-by Assist  Sit to Supine (OT): Minimal Assist (reverse logroll)    BALANCE ASSESSMENT  Static Sitting: Supervision  Static Standing: Stand-by Assist    FUNCTIONAL ADL ASSESSMENT  Eating: Modified Independent (increased time for opening packages)  LB Dressing Seated: Moderate Assist  LB Dressing Standing: Minimal Assist  Toileting Seated: Stand-by Assist  Toileting Standing: Moderate Assist    ACTIVITY TOLERANCE:              BP: 146/61  BP Location: Right arm  BP Method: Automatic  Patient Position: Lying    O2 SATURATIONS  Oxygen Therapy  SPO2% on Room Air at Rest: 94    EDUCATION PROVIDED  Patient Education : Functional Transfer Techniques; Plan of Care; Posture/Positioning  Patient's Response to Education: Verbalized Understanding    Equipment used: rw, gait belt  Demonstrates functional use, Would benefit from additional trial yes     Exercises:  Issued foam block for hand exercises and prevention of edema    Exercises Repetitions Comments   Scapular elevation     Scapular retraction     Shoulder rolls     Shoulder flexion     Shoulder abduction     Shoulder internal/external rotation     Forward punch     Elbow flexion     Elbow extension     Forearm pronation/supination     Wrist flexion/extension     Gross grasp/fist pumps     Ankle pumps     Knee extension     Marching       UPPER EXTREMITY  Persistent impairment in L shoulder flexion, with increased pain today. Patient reported she was told it could be related to positioning on table for procedures.     Therapist comments:   Patient complained of increased L shoulder pain since yesterday. OT cued patient to don depends prior to ambulation. OT assisted patient to thread Depends over feet due to L UE pain, then cued patient to reach and pull from ankles to knees. MIN A to stand from chair. CGA to pull up Depends to waist,  with use of BUEs. Functional mobility completed with CGA in preparation for household  distances with cues to pace self . No increase in pain reported with RW use. Patient needed cues for reverse logroll and MIN A for RLE. Noted increased L shoulder pain while rolling from sidelying to supine towards L sdie; max A needed to place pillows under LUE to provide support.     Patient End of Session: Hospital anti-slip socks, All patient questions and concerns addressed, RN aware of session/findings, Call light within reach, Needs met, In bed    SUBJECTIVE  Cooperative    PAIN ASSESSMENT  Ratin  Location: L shoulder  Management Techniques: Heat, Nurse notified, Repositioning, Relaxation     OBJECTIVE  Precautions: Drain(s)    AM-PAC ‘6-Clicks’ Inpatient Daily Activity Short Form  -   Putting on and taking off regular lower body clothing?: A Lot  -   Bathing (including washing, rinsing, drying)?: A Lot  -   Toileting, which includes using toilet, bedpan or urinal? : A Little  -   Putting on and taking off regular upper body clothing?: A Little  -   Taking care of personal grooming such as brushing teeth?: A Little  -   Eating meals?: None    AM-PAC Score:  Score: 17  Approx Degree of Impairment: 50.11%  Standardized Score (AM-PAC Scale): 37.26    PLAN     OT Treatment Plan: Balance activities, ADL training, Functional transfer training, Patient/Family education, Patient/Family training, Compensatory technique education, Energy conservation/work simplification techniques, UE strengthening/ROM, Endurance training, Equipment eval/education, Fine motor coordination activities, Neuromuscluar reeducation  Rehab Potential : Good  Frequency: 3-5x/week    OT Goals: ongoing 25  ADL Goals   Patient will perform grooming: with modified independent  Patient will perform lower body dressing:  with modified independent  Patient will perform toileting: with modified independent     Functional Transfer Goals  Patient will transfer to toilet:  with modified independent     UE Exercise Program Goal  Patient will be  supervision with left AROM/ROMMEL HEP (home exercise program).     New Goal 4/7/25:  PHQ9 screening    OT Session Time: 20 minutes  Self-Care Home Management:5 minutes  Therapeutic Activity: 15 minutes

## 2025-04-09 NOTE — PROGRESS NOTES
Vascular Surgery Progress Note    /61 (BP Location: Right arm)   Pulse 85   Temp 98.4 °F (36.9 °C) (Temporal)   Resp 26   Wt 221 lb 12.5 oz (100.6 kg)   SpO2 93%   BMI 36.91 kg/m²     Recent Labs   Lab 04/07/25 2019 04/08/25 0441 04/09/25 0425   RBC 3.79* 3.54* 3.13*   HGB 9.7* 9.1* 8.1*   HCT 30.0* 28.2* 25.1*   MCV 79.2* 79.7* 80.2   MCH 25.6* 25.7* 25.9*   MCHC 32.3 32.3 32.3   RDW 16.3 16.2 16.7   NEPRELIM 14.76* 11.08* 7.70   WBC 17.1* 12.9* 10.0   .0 182.0 227.0       Recent Labs   Lab 04/07/25 2019 04/08/25 0441 04/09/25 0425   * 145* 106*   BUN 9 11 8*   CREATSERUM 1.07* 1.17* 1.06*   CA 8.9 9.3 9.1   * 139 140   K 3.8 4.3  4.3 3.6    107 106   CO2 23.0 25.0 26.0       Patient seen and examined.  No issues overnight.  Vitals and labs stable.  On examination groins flat with no hematoma.  Neck incision flat.  Minimal serous drainage.  Neck drain removed.  Will remove central line and add additional peripheral IV.  Neurologically she is intact with the exception of some weakness in her abduction of the left shoulder.  I informed her this neuropraxia will improve with time.  Continue aggressive physical therapy as this is a result of positioning in the operating room.  She is tolerating p.o. ambulating, and voiding.  Will have her work with therapy today.  Okay to transfer to floor with telemetry.  Disposition and the next several days to inpatient rehab.  Continue to hold blood pressure medications as with surgical correction of the aorta her blood pressure now is in the desired 140 range for spinal cord protection and perfusion.    Magdy Palm MD  [facility]  Vascular Surgery

## 2025-04-09 NOTE — PROGRESS NOTES
[facility]   part of [facility]     Hospitalist Progress Note     Alisha Wilde Patient Status:  Inpatient    10/25/1965 MRN [id number]   Location [facility] 4SW-A Attending Danette Atwood, DO   Hosp Day # 10 PCP Bridger Avendano MD     Chief Complaint: Back pain, chest pain    Subjective:     Patient resting in chair with no complaints    Objective:    Review of Systems:   A comprehensive review of systems was completed; pertinent positive and negatives stated in subjective.    Vital signs:  Temp:  [97.8 °F (36.6 °C)-99.7 °F (37.6 °C)] 98.4 °F (36.9 °C)  Pulse:  [74-94] 85  Resp:  [15-26] 26  BP: (132-164)/(60-74) 139/66  SpO2:  [90 %-100 %] 93 %    Physical Exam:    General: No acute distress  Respiratory: No wheezes, no rhonchi  Cardiovascular: S1, S2, regular rate and rhythm  Abdomen: Soft, Non-tender, non-distended, positive bowel sounds  Neuro: No new focal deficits.   Extremities: No edema    Diagnostic Data:    Labs:  Recent Labs   Lab 25  0411 25  0644 25  0612 25  1203 25  042   WBC 13.6*   < > 13.9* 14.1* 17.1* 12.9* 10.0   HGB 12.1   < > 8.0* 8.0* 9.7* 9.1* 8.1*   MCV 77.2*   < > 79.4* 79.6* 79.2* 79.7* 80.2   .0   < > 172.0  172.0 179.0 176.0 182.0 227.0   INR 1.18  --   --   --  1.19  --   --     < > = values in this interval not displayed.       Recent Labs   Lab 25  0425   * 145* 106*   BUN 9 11 8*   CREATSERUM 1.07* 1.17* 1.06*   CA 8.9 9.3 9.1   ALB 3.7 3.7 3.7   * 139 140   K 3.8 4.3  4.3 3.6    107 106   CO2 23.0 25.0 26.0   ALKPHO 67 64 58   AST 15 13 14   ALT <7* <7* <7*   BILT 0.6 0.4 0.4   TP 6.6 6.5 6.4       Estimated Glomerular Filtration Rate: 60 mL/min/1.73m2 (result from lab).    No results for input(s): \"TROP\", \"TROPHS\", \"CK\" in the last 168 hours.    Recent Labs   Lab 25  0411 25   PTP 15.1* 15.2*   INR 1.18 1.19                   Microbiology    No results found for this visit on 03/30/25.      Imaging: Reviewed in Epic.    Medications: Scheduled Medications[1]    Assessment & Plan:      #Aortic arch dissection with aneurysm  #Left renal artery dissection  -S/p left carotid to subclavian transposition, endovascular arch repair, stenting of the left renal, left iliac  -Repeat CTA 4/5 with some dissection within the left subclavian artery as well as some persistent flap with suboptimal expansion of the stent graft  -S/p thoracic stent graft, balloon angioplasty and stent right external iliac artery 4/7  -Plavix  -Vascular surgery following    #Prior hx of Type A aortic dissection  -S/p repair 11/2024  -Post op complications with rep failure and NAIMA needing trach and peg that are now removed     #Sepsis 2/2 aspiration pneumonia   #Acute hypoxic respiratory failure, resolved  #Moderate left pleural effusion with atelectasis/pneumonia  -IV antibiotics    #Pericardial fluid noted on CTA  -Monitor closely     #Ileus, resolved  -Bowel regimen     #LUE weakness   -Suspected 2/2 to dissection/surgery      #Hypertension    #Hyperlipidemia     #CKD stage 3     #TANYA     #Obesity, BMI 36    Danette Atwood DO    Supplementary Documentation:     Quality:  DVT Mechanical Prophylaxis:   SCDs, Early ambuation  DVT Pharmacologic Prophylaxis   Medication    enoxaparin (Lovenox) 40 MG/0.4ML SUBQ injection 40 mg                Code Status: Full Code  Webb: No urinary catheter in place  Webb Duration (in days):   Central line:    BRANDON:     Discharge is dependent on: Clinical improvement  At this point Ms. Wilde is expected to be discharge to: TBD    The 21st Century Cures Act makes medical notes like these available to patients in the interest of transparency. Please be advised this is a medical document. Medical documents are intended to carry relevant information, facts as evident, and the clinical opinion of the practitioner. The medical note is  intended as peer to peer communication and may appear blunt or direct. It is written in medical language and may contain abbreviations or verbiage that are unfamiliar.                         [1]    piperacillin-tazobactam  3.375 g Intravenous Q8H    melatonin  3 mg Oral Nightly    enoxaparin  40 mg Subcutaneous Daily    clopidogrel  75 mg Oral Daily    ipratropium-albuterol  3 mL Nebulization Q6H WA    pantoprazole  40 mg Intravenous Q24H    multivitamin  1 tablet Oral Daily    docusate sodium  100 mg Oral BID    polyethylene glycol (PEG 3350)  17 g Oral Daily

## 2025-04-09 NOTE — PHYSICAL THERAPY NOTE
PHYSICAL THERAPY TREATMENT NOTE - INPATIENT    Room Number: 474/474-A     Session: 3    Number of Visits to Meet Established Goals: 5    Presenting Problem: Endovascular repair of aortic arch 4/2  Co-Morbidities : CKD, previous aneurysm repair, DM, HTN, HL, obesity    History related to current admission: Patient is a 59 year old female admitted on 3/30/2025 from home for several week c/o of chest and subscapular pain.  Pt diagnosed with aortic arch aneurysm.  Pt is now s/p LEFT CAROTID TO SUBCLAVIAN TRANSPOSITION, ENDOVASCULAR ARCH REPAIR. STENTING OF THE LEFT RENAL, LEFT ILIAC - per chart on 4/2  Pt w/ additional procedure on 4/7 - thoracic endograft repair for endoleak     HOME SITUATION  Type of Home: Condo  Home Layout: One level  Stairs to Enter : 0        Stairs to Bedroom: 0         Lives With: Alone    Drives: Yes           Prior Level of [place]: Pt is typically independent w/ adl's, gait w/ cane, drives and is currently on short term disability w/ [employer].    PHYSICAL THERAPY ASSESSMENT   Patient demonstrates fair progress this session, goals  remain in progress.      Patient is requiring minimal assist as a result of the following impairments: decreased functional strength, decreased endurance/aerobic capacity, impaired standing balance, decreased muscular endurance, medical status, and limited L elbow and shoulder active ROM.     Patient continues to function below baseline with bed mobility, transfers, gait, and performing household tasks.  Next session anticipate patient to progress bed mobility, transfers, and gait.  Physical Therapy will continue to follow patient for duration of hospitalization.    Patient continues to benefit from continued skilled PT services: to facilitate return to prior level of function as patient demonstrates high motivation with excellent tolerance to an intensive therapy program .  Pt w/ new L shoulder and biceps weakness and pain since this admission limiting some of  her functional activity.  Pt relying on rw for gait which is not her baseline. Pt lives alone and had been independent prior to admission (used a cane).  Pt is not near her baseline level at this time.    PLAN DURING HOSPITALIZATION  Nursing Mobility Recommendation : 1 Assist  PT Device Recommendation: Rolling walker  PT Treatment Plan: Bed mobility, Endurance, Patient education, Family education, Gait training, Range of motion, Transfer training, Balance training  Frequency (Obs): 3-5x/week     CURRENT GOALS     Goal #1 Patient is able to demonstrate supine - sit EOB @ level: modified independent      Goal #2 Patient is able to demonstrate transfers EOB to/from [facility] at assistance level: modified independent      Goal #3 Patient is able to ambulate 250 feet with assist device:  least restrictive device  at assistance level: supervision      Goal #4     Goal #5     Goal #6     Goal Comments: Goals established on 4/3/2025  4/9/2025 all goals ongoing    SUBJECTIVE  Pt denies SOB, motivated to perform functional mobility   OBJECTIVE  Precautions: Drain(s)    WEIGHT BEARING RESTRICTION     PAIN ASSESSMENT   Rating: 3  Location: L shoulder  Management Techniques: Repositioning, Body mechanics, Breathing techniques, Relaxation    BALANCE                                                                                                                       Static Sitting: Fair  Dynamic Sitting: Fair           Static Standing: Fair -  Dynamic Standing: Poor +    ACTIVITY TOLERANCE                         O2 WALK       AM-PAC '6-Clicks' INPATIENT SHORT FORM - BASIC MOBILITY  How much difficulty does the patient currently have...  Patient Difficulty: Turning over in bed (including adjusting bedclothes, sheets and blankets)?: None   Patient Difficulty: Sitting down on and standing up from a chair with arms (e.g., wheelchair, bedside commode, etc.): A Little   Patient Difficulty: Moving from lying on back to sitting on the side of  the bed?: A Little   How much help from another person does the patient currently need...   Help from Another: Moving to and from a bed to a chair (including a wheelchair)?: A Little   Help from Another: Need to walk in hospital room?: A Little   Help from Another: Climbing 3-5 steps with a railing?: A Lot     AM-PAC Score:  Raw Score: 18   Approx Degree of Impairment: 46.58%   Standardized Score (AM-PAC Scale): 43.63   CMS Modifier (G-Code): CK    FUNCTIONAL ABILITY STATUS  Gait Assessment   Functional Mobility/Gait Assessment  Gait Assistance: Minimum assistance  Distance (ft): 110  Assistive Device: Rolling walker  Pattern: Shuffle (decreased [place])    Skilled Therapy Provided  Pt presents in BSC  Therapist cued pt for AROM BLE for pre gait warm up   Pt was gait trained c RW min A cues for EC and standing rest for breathing, pt demos good safety awareness c turns    Bed Mobility:  Rolling:    Supine<>Sit: NT   Sit<>Supine: R from sidelying w/ bed flat- mod I cues for sequencing-  Pain in L shoulder limiting pt initially, place pillows to support and warm pack with relief     Transfer Mobility:  Sit<>Stand: Performed 2x over course of session.  CGA from chair, better able to engage L ue w/ standing today.  Once in standing was able to maintain balance while pulling up her new brief.   Stand<>Sit: CGA,.   Gait: min A x 1 c RW , standing rest break     Therapist's Comments:BP monitored 146/61    Patient End of Session: In bed, Needs met, Call light within reach, RN aware of session/findings, All patient questions and concerns addressed, Hospital anti-slip socks, Alarm set    PT Session Time: 26 minutes  Gait Trainin minutes  Therapeutic Activity:10 minutes  Therapeutic Exercise: 0 minutes   Neuromuscular Re-education: 0 minutes

## 2025-04-09 NOTE — PROGRESS NOTES
Alisha Wilde Patient Status:  Inpatient    10/25/1965 MRN [id number]   [facility] 4SW-A Attending Jade Cameron MD   Hosp Day # 10 PCP Bridger Avendano MD     Critical Care Progress Note      Subjective/24h events: Up in chair. Complains of increasing left arm pain. No issues overnight.       Medications:   potassium phosphate dibasic 15 mmol in sodium chloride 0.9% 250 mL IVPB  15 mmol Intravenous Once    Followed by    potassium chloride  20 mEq Intravenous Once    melatonin  3 mg Oral Nightly    enoxaparin  40 mg Subcutaneous Daily    clopidogrel  75 mg Oral Daily    piperacillin-tazobactam  4.5 g Intravenous Q8H    ipratropium-albuterol  3 mL Nebulization Q6H WA    pantoprazole  40 mg Intravenous Q24H    multivitamin  1 tablet Oral Daily    docusate sodium  100 mg Oral BID    polyethylene glycol (PEG 3350)  17 g Oral Daily              Intake/Output Summary (Last 24 hours) at 2025 0832  Last data filed at 2025 0628  Gross per 24 hour   Intake 100 ml   Output 603 ml   Net -503 ml       /68   Pulse 74   Temp 98 °F (36.7 °C) (Temporal)   Resp 17   Wt 221 lb 12.5 oz (100.6 kg)   SpO2 95%   BMI 36.91 kg/m²     Physical Exam:    General Appearance: Alert, cooperative, no acute distress, appears stated age  Neck: No JVD  Lungs: Clear to auscultation bilaterally, respirations unlabored. Left upper CW SERVANDO in place  Heart: Regular rate and rhythm, S1 and S2 normal  Abdomen: Soft, non-tender, bowel sounds active  Extremities: Atraumatic, no cyanosis or edema, capillary refill <3 sec.    Pulses: 2+ and symmetric all extremities  Skin: Skin color, texture, turgor normal for ethnicity, no rashes or lesions, warm and dry  Neurologic: Generalized weakness, weakness to LUE and pain due surgery--vascular surgery aware    Recent Labs   Lab 25  0425   RBC 3.13*   HGB 8.1*   HCT 25.1*   MCV 80.2   MCH 25.9*   MCHC 32.3   RDW 16.7   NEPRELIM 7.70   WBC 10.0   .0     Recent Labs   Lab  04/07/25 2019 04/08/25  0441 04/09/25  0425   * 145* 106*   BUN 9 11 8*   CREATSERUM 1.07* 1.17* 1.06*   CA 8.9 9.3 9.1   ALB 3.7 3.7 3.7   * 139 140   K 3.8 4.3  4.3 3.6    107 106   CO2 23.0 25.0 26.0   ALKPHO 67 64 58   AST 15 13 14   ALT <7* <7* <7*   BILT 0.6 0.4 0.4   TP 6.6 6.5 6.4     Recent Labs   Lab 04/02/25  1357   ABGPHT 7.35   EBMBUI9G 29*   PFOCO8T 72*   ABGHCO3 18.3*   ABGBE -8.5*   TEMP 98.6   ANDIE Not Applicable   SITE Arterial Line   DEV Room Air   THGB 9.7*     No results for input(s): \"BNP\" in the last 168 hours.  No results for input(s): \"TROP\", \"CK\" in the last 168 hours.    XR CHEST AP PORTABLE  (CPT=71045)  Result Date: 4/8/2025  CONCLUSION:  1. Persistent opacity left lung base consistent with left effusion and atelectasis. 2. Endograft is unchanged.    LOCATION:  [facility]      Dictated by (CST): Kong Ramesh MD on 4/08/2025 at 12:21 PM     Finalized by (CST): Kong Ramesh MD on 4/08/2025 at 12:22 PM            Assessment/Plan:  59-year-old female admitted from [place] on esmolol and cardene with a type B dissection status post endovascular repair 4/3 and endo leak repair on 4/7.      Problems  Type B dissection s/p endovascular repair on 4/2 (Had arch dissection and left renal artery dissection and left carotid transposition with endovascular arch repair), now s/p thoracic stent graft 4/7  Hx Type A aortic dissection s/p repair November 2024  Acute hypoxic respiratory failure 2/2 Aspiration PNA  Ileus  Hx HTN  Hx HLD  CKD  Leukocytosis      Neuro/Psych: No encephalopathy. No focal deficits. Pain mgt PRN with Norco Q 4hrs. Increasing pain to LUE--vascular aware.     Cardiovascular:  #Type B dissection s/p endovascular repair on 4/2 and thoracic stent graft for endo leak 4/7  #Hx Type A aortic dissection s/p repair November 2024  -SBP goal   -Left neck drain--monitor output  -Plavix    #Hx HTN  -BP goal as noted above  -BB PRN    #Hx  HLD  -statin    Pulmonary:  #Acute hypoxic respiratory failure 2/2 aspiration pna  -On RA  -CXR with left pleural effusion  -Encourage IS  -Duonebs Q6hrs  -Antbx as below    GI:  #Ileus, now having BMs  -CT with moderate stool, fluid filled SB and fluid in mesentery  -Scheduled and PRN bowel regimen  -Ambulate with PT/OT    Renal/Metabolic:  #CKD, baseline Cr- 1.3-1.5  -Cr- near baseline  -Monitor I/Os  -Daily BMP    Heme:   #Anemia  -Hgb with slow down-trend   -No signs of active bleeding  -Daily CBC    ID:  #PNA, concerning for aspiration  #Leukocytosis-->resolved  -WBC now normalized--10 today  -Afebrile--monitor  -Zosyn x 7 days (4/4-)  -Daily CBC    Endocrine: No active issues.      ICU checklist  Feeding: regular  Analgesia: PRN norco  Sedation: n/a  Thromboembolism PPX: Lovenox, SCD  Stress Ulcer PPX: n/a  Glucose control: n/a  Bowel Regimen: scheduled and PRN bowel regimen  Lines/drains/tubes: PIV, SERVANDO drain to left neck, CVC  Deescalation of antibiotics: Zosyn (4/6- )  Therapies: PT/OT  Dispo: stable to transfer to floor if ok with vascular surgery    Code Status: Full Code    Plan of care discussed with intensivist, Dr. Boswell.      Loree Kraus, [facility]  ICU  Phone  [phone]   Pager [phone]

## 2025-04-10 LAB
ALBUMIN SERPL-MCNC: 3.9 G/DL (ref 3.2–4.8)
ALBUMIN/GLOB SERPL: 1.3 {RATIO} (ref 1–2)
ALP LIVER SERPL-CCNC: 61 U/L (ref 50–130)
ALT SERPL-CCNC: <7 U/L (ref 10–49)
ANION GAP SERPL CALC-SCNC: 9 MMOL/L (ref 0–18)
AST SERPL-CCNC: 11 U/L (ref ?–34)
BASOPHILS # BLD AUTO: 0.05 X10(3) UL (ref 0–0.2)
BASOPHILS NFR BLD AUTO: 0.4 %
BILIRUB SERPL-MCNC: 0.4 MG/DL (ref 0.2–1.1)
BUN BLD-MCNC: 7 MG/DL (ref 9–23)
CALCIUM BLD-MCNC: 9.2 MG/DL (ref 8.7–10.6)
CHLORIDE SERPL-SCNC: 106 MMOL/L (ref 98–112)
CO2 SERPL-SCNC: 24 MMOL/L (ref 21–32)
CREAT BLD-MCNC: 1.12 MG/DL (ref 0.55–1.02)
EGFRCR SERPLBLD CKD-EPI 2021: 56 ML/MIN/1.73M2 (ref 60–?)
EOSINOPHIL # BLD AUTO: 0.24 X10(3) UL (ref 0–0.7)
EOSINOPHIL NFR BLD AUTO: 2 %
ERYTHROCYTE [DISTWIDTH] IN BLOOD BY AUTOMATED COUNT: 17.1 %
GLOBULIN PLAS-MCNC: 2.9 G/DL (ref 2–3.5)
GLUCOSE BLD-MCNC: 111 MG/DL (ref 70–99)
HCT VFR BLD AUTO: 26.9 % (ref 35–48)
HGB BLD-MCNC: 8.8 G/DL (ref 12–16)
IMM GRANULOCYTES # BLD AUTO: 0.22 X10(3) UL (ref 0–1)
IMM GRANULOCYTES NFR BLD: 1.9 %
LYMPHOCYTES # BLD AUTO: 1.67 X10(3) UL (ref 1–4)
LYMPHOCYTES NFR BLD AUTO: 14.2 %
MAGNESIUM SERPL-MCNC: 2.2 MG/DL (ref 1.6–2.6)
MCH RBC QN AUTO: 25.8 PG (ref 26–34)
MCHC RBC AUTO-ENTMCNC: 32.7 G/DL (ref 31–37)
MCV RBC AUTO: 78.9 FL (ref 80–100)
MONOCYTES # BLD AUTO: 1.49 X10(3) UL (ref 0.1–1)
MONOCYTES NFR BLD AUTO: 12.7 %
NEUTROPHILS # BLD AUTO: 8.08 X10 (3) UL (ref 1.5–7.7)
NEUTROPHILS # BLD AUTO: 8.08 X10(3) UL (ref 1.5–7.7)
NEUTROPHILS NFR BLD AUTO: 68.8 %
OSMOLALITY SERPL CALC.SUM OF ELEC: 287 MOSM/KG (ref 275–295)
PHOSPHATE SERPL-MCNC: 3 MG/DL (ref 2.4–5.1)
PHOSPHATE SERPL-MCNC: 3 MG/DL (ref 2.4–5.1)
PLATELET # BLD AUTO: 310 10(3)UL (ref 150–450)
POTASSIUM SERPL-SCNC: 3.8 MMOL/L (ref 3.5–5.1)
POTASSIUM SERPL-SCNC: 3.8 MMOL/L (ref 3.5–5.1)
PROT SERPL-MCNC: 6.8 G/DL (ref 5.7–8.2)
RBC # BLD AUTO: 3.41 X10(6)UL (ref 3.8–5.3)
SODIUM SERPL-SCNC: 139 MMOL/L (ref 136–145)
WBC # BLD AUTO: 11.8 X10(3) UL (ref 4–11)

## 2025-04-10 PROCEDURE — 99232 SBSQ HOSP IP/OBS MODERATE 35: CPT | Performed by: STUDENT IN AN ORGANIZED HEALTH CARE EDUCATION/TRAINING PROGRAM

## 2025-04-10 RX ORDER — POTASSIUM CHLORIDE 1500 MG/1
40 TABLET, EXTENDED RELEASE ORAL ONCE
Status: COMPLETED | OUTPATIENT
Start: 2025-04-10 | End: 2025-04-10

## 2025-04-10 RX ORDER — CARVEDILOL 12.5 MG/1
25 TABLET ORAL
Status: DISCONTINUED | OUTPATIENT
Start: 2025-04-10 | End: 2025-04-11

## 2025-04-10 RX ORDER — PANTOPRAZOLE SODIUM 40 MG/1
40 TABLET, DELAYED RELEASE ORAL
Status: DISCONTINUED | OUTPATIENT
Start: 2025-04-11 | End: 2025-04-15

## 2025-04-10 RX ORDER — HYDRALAZINE HYDROCHLORIDE 25 MG/1
25 TABLET, FILM COATED ORAL 3 TIMES DAILY
Status: DISCONTINUED | OUTPATIENT
Start: 2025-04-10 | End: 2025-04-11

## 2025-04-10 RX ORDER — AMLODIPINE BESYLATE 5 MG/1
5 TABLET ORAL DAILY
Status: DISCONTINUED | OUTPATIENT
Start: 2025-04-10 | End: 2025-04-11

## 2025-04-10 NOTE — PLAN OF CARE
Assumed care at 0730; Pt A/o x 4, BP elevated and complaints of pain. PRN pain meds provided with relief and attending aware of elevated bp. Orders received.  Saturating well on RA; Plan of care discussed with patient. Educated on fall risk and use of call light. Belongings and call light within reach. Bed/chair alarm activated; Bed at lowest position and locked.     Problem: Diabetes/Glucose Control  Goal: Glucose maintained within prescribed range  Description: INTERVENTIONS:- Monitor Blood Glucose as ordered- Assess for signs and symptoms of hyperglycemia and hypoglycemia- Administer ordered medications to maintain glucose within target range- Assess barriers to adequate nutritional intake and initiate nutrition consult as needed- Instruct patient on self management of diabetes  Outcome: Progressing     Problem: Patient/Family Goals  Goal: Patient/Family Long Term Goal  Description: Patient's Long Term Goal: To go home. Interventions:- PT/OT. Trend labs/vs. - See additional Care Plan goals for specific interventions  Outcome: Progressing  Goal: Patient/Family Short Term Goal  Description: Patient's Short Term Goal: Pain management. Interventions: - Take pain meds prn. Reposition. - See additional Care Plan goals for specific interventions  Outcome: Progressing     Problem: CARDIOVASCULAR - ADULT  Goal: Absence of cardiac arrhythmias or at baseline  Description: INTERVENTIONS:- Continuous cardiac monitoring, monitor vital signs, obtain 12 lead EKG if indicated- Evaluate effectiveness of antiarrhythmic and heart rate control medications as ordered- Initiate emergency measures for life threatening arrhythmias- Monitor electrolytes and administer replacement therapy as ordered  Outcome: Progressing

## 2025-04-10 NOTE — CM/SW NOTE
SW received update that authorization for Vic has been approved. RN updated.     Josette IZQUIERDO, LSW  Discharge Planner

## 2025-04-10 NOTE — PLAN OF CARE
Patient alert and oriented x4. Up with minimal assist/walker. NSR/ST on tele. Maintaining o2 sats on RA. CPAP at night, tolerated well. Denies SOB. Scheduled nebs. Encouraged patient to use IS. Elevated blood pressure this am, prn hydralazine given. Continent of bowel/bladder. C/o loose stool x1 overnight, held stool softener. Continues to have L arm pain, warm packs applied. Patient having difficulty lifting arm. IV abx given. Reviewed POC with patient, verbalized understanding. Safety precautions put in place, bed alarm on.     0540 Patient anxious this am. States it is d/t her elevated blood pressure. PRN hydralazine given. C/o L arm pain, declined pain meds at this time. Warm pack applied. Notified MD about anxiety. Placed 2L NC on patient due to c/o feeling SOB. O2 sats 95% on RA.     Problem: Patient/Family Goals  Goal: Patient/Family Long Term Goal  Description: Patient's Long Term Goal: To go home. Interventions:- PT/OT. Trend labs/vs. - See additional Care Plan goals for specific interventions  Outcome: Progressing  Goal: Patient/Family Short Term Goal  Description: Patient's Short Term Goal: Pain management. Interventions: - Take pain meds prn. Reposition. - See additional Care Plan goals for specific interventions  Outcome: Progressing     Problem: CARDIOVASCULAR - ADULT  Goal: Absence of cardiac arrhythmias or at baseline  Description: INTERVENTIONS:- Continuous cardiac monitoring, monitor vital signs, obtain 12 lead EKG if indicated- Evaluate effectiveness of antiarrhythmic and heart rate control medications as ordered- Initiate emergency measures for life threatening arrhythmias- Monitor electrolytes and administer replacement therapy as ordered  Outcome: Progressing

## 2025-04-10 NOTE — PROGRESS NOTES
Vascular Surgery Progress Note    /78 (BP Location: Right arm)   Pulse 90   Temp 97.8 °F (36.6 °C) (Oral)   Resp 20   Wt 227 lb 11.2 oz (103.3 kg)   SpO2 100%   BMI 37.89 kg/m²     Recent Labs   Lab 04/08/25  0441 04/09/25  0425 04/10/25  0514   RBC 3.54* 3.13* 3.41*   HGB 9.1* 8.1* 8.8*   HCT 28.2* 25.1* 26.9*   MCV 79.7* 80.2 78.9*   MCH 25.7* 25.9* 25.8*   MCHC 32.3 32.3 32.7   RDW 16.2 16.7 17.1   NEPRELIM 11.08* 7.70 8.08*   WBC 12.9* 10.0 11.8*   .0 227.0 310.0       Recent Labs   Lab 04/08/25  0441 04/09/25  0425 04/10/25  0514   * 106* 111*   BUN 11 8* 7*   CREATSERUM 1.17* 1.06* 1.12*   CA 9.3 9.1 9.2    140 139   K 4.3  4.3 3.6 3.8  3.8    106 106   CO2 25.0 26.0 24.0       Patient seen and examined.  No issues overnight.  Sitting up in the chair comfortably.  Vitals and labs stable   Except for some mild hypertension today.  Have started her home medications at half dose.  On examination groins flat and neck incision flat with no hematoma.  Neurologically intact and at her baseline.  Working through the abduction of her left upper arm and shoulder.  Agree strongly with physical therapy evaluation that she would greatly benefit from inpatient rehabilitation.  Will await for insurance authorization for Vic.  Emphasized importance of increase ambulation and activity.  Tolerating p.o. and having normal bowel and urinary function.  Once excepted, she is okay for discharge from vascular surgery standpoint with aspirin and Plavix.  She will need follow-up in 2 weeks.    Magdy Palm MD  [facility]  Vascular Surgery

## 2025-04-10 NOTE — OCCUPATIONAL THERAPY NOTE
OCCUPATIONAL THERAPY TREATMENT NOTE - INPATIENT     Room Number: 8609/8609-A  Session: 4   Number of Visits to Meet Established Goals: 5    Presenting Problem: s/p endovascular aortic arch repair 4/2    HOME SITUATION  Type of Home: Condo  Home Layout: One level  Lives With: Alone     Toilet and Equipment: Standard height toilet  Shower/Tub and Equipment: Tub-shower combo, Shower chair     Occupation/Status: ST disability from [employer]  Hand Dominance: Right  Drives: Yes     Prior Level of Function: Pt reports independence with ADL and ambulation with cane prior to admit.  ASSESSMENT   Patient demonstrates good  progress this session, goals remain in progress.    Patient continues to function below baseline with toileting, lower body dressing, grooming, bed mobility, transfers, static sitting balance, dynamic sitting balance, static standing balance, dynamic standing balance, maintaining seated position, functional standing tolerance, energy conservation strategies, and aerobic capacity.   Contributing factors to remaining limitations include decreased functional strength, decreased functional reach, decreased endurance, pain, impaired standing balance, and limited LUE ROM.  Next session anticipate patient to progress dynamic standing balance and aerobic capacity.  Occupational Therapy will continue to follow patient for duration of hospitalization.    Patient continues to benefit from continued skilled OT services: to facilitate return to prior level of function as patient demonstrates high motivation with excellent tolerance to an intensive therapy program.     History: Patient is a 59 year old female admitted on 3/30/2025 with Presenting Problem: s/p endovascular aortic arch repair 4/2. Co-Morbidities : CKD, previous aneurysm repair, DM, HTN, HL, obesity     Patient is a 59 year old female admitted on 3/30/2025 from home for several week c/o of chest and subscapular pain.  Pt diagnosed with aortic arch aneurysm.  Pt  is now s/p LEFT CAROTID TO SUBCLAVIAN TRANSPOSITION, ENDOVASCULAR ARCH REPAIR. STENTING OF THE LEFT RENAL, LEFT ILIAC - per chart on 4/2     WEIGHT BEARING RESTRICTION       Recommendations for nursing staff:   Transfers: up x 1 assist, CGA, RW  Toileting location: Toilet with raised over toilet commode    TREATMENT SESSION:  Patient Start of Session: semi supine in bed    FUNCTIONAL TRANSFER ASSESSMENT  Sit to Stand: Edge of Bed  Edge of Bed: Contact Guard Assist  Chair: Contact Guard Assist    BED MOBILITY  Supine to Sit : Stand-by Assist  Sit to Supine (OT): Stand-by Assist  Scooting: Supervision    BALANCE ASSESSMENT  Static Sitting: Supervision  Static Standing: Stand-by Assist  Dynamic Sitting: Supervision  Dynamic Standing: Contact Guard Assist    FUNCTIONAL ADL ASSESSMENT  Eating: Modified Independent (increased time for opening packages)  Grooming Standing: Stand-by Assist (brushed teeth at sink)  LB Dressing Seated: Supervision (donned shoes)  LB Dressing Standing: Stand-by Assist (donned/doffed brief at toilet)  Toileting Seated: Stand-by Assist  Toileting Standing: Not Tested    ACTIVITY TOLERANCE: Pt participated in dynamic standing activity in bathroom; tolerated distance to bathroom with CGA and use of RW, also tolerated standing grooming task at sink ~ 5 min with SBA, no loss of balance or SOB noted during activity.           BP: (!) 173/93-RN notified  BP Location: Right arm  BP Method: Automatic  Patient Position: Sitting    O2 SATURATIONS       EDUCATION PROVIDED  Patient Education : Role of Occupational Therapy; Plan of Care; Functional Transfer Techniques; Fall Prevention; Posture/Positioning; Edema Reduction; Energy Conservation; Proper Body Mechanics  Patient's Response to Education: Verbalized Understanding; Returned Demonstration; Requires Further Education    Equipment used: RW, raised over toilet commode  Demonstrates functional use     Exercises:    Exercises Repetitions Comments    Scapular elevation 10    Scapular retraction 10    Shoulder rolls     Shoulder flexion     Shoulder abduction     Shoulder internal/external rotation     Forward punch     Elbow flexion     Elbow extension     Forearm pronation/supination     Wrist flexion/extension     Gross grasp/fist pumps     Ankle pumps     Knee extension     Marching       UPPER EXTREMITY  ROM: within functional limits, L: Limited, pt seen using RUE to guide L arm during activity.  Strength: is within functional limits       Therapist comments: Pt pleasant and cooperative this date, requesting to use bathroom this date. Pt assisted into bathroom with CGA, completed toileting routine, required setup for pericare wipes, observed to  utilize LUE for pericare. Pt completed standing grooming routine at sink with SBA ~ 5 min. Pt tolerated distance back to bed, able to reposition self back into bed with SBA.    Patient End of Session: In bed, Needs met, Call light within reach, RN aware of session/findings, All patient questions and concerns addressed, Alarm set    SUBJECTIVE  \"Is the purewick in there?\"    PAIN ASSESSMENT  Ratin  Location: LUE  Management Techniques: Heat, Nurse notified, Repositioning, Relaxation     OBJECTIVE  Precautions: Drain(s)    AM-PAC ‘6-Clicks’ Inpatient Daily Activity Short Form  -   Putting on and taking off regular lower body clothing?: None  -   Bathing (including washing, rinsing, drying)?: A Little  -   Toileting, which includes using toilet, bedpan or urinal? : A Little  -   Putting on and taking off regular upper body clothing?: A Little  -   Taking care of personal grooming such as brushing teeth?: None  -   Eating meals?: None    AM-PAC Score:  Score: 21  Approx Degree of Impairment: 32.79%  Standardized Score (AM-PAC Scale): 44.27    PLAN     OT Treatment Plan: Balance activities, ADL training, Functional transfer training, Patient/Family education, Patient/Family training, Compensatory technique  education, Energy conservation/work simplification techniques, UE strengthening/ROM, Endurance training, Equipment eval/education, Fine motor coordination activities, Neuromuscluar reeducation  Rehab Potential : Good  Frequency: 3-5x/week    OT Goals: ADL Goals all goals ongoing as of 4/10  Patient will perform grooming: with modified independent  Patient will perform lower body dressing:  with modified independent  Patient will perform toileting: with modified independent     Functional Transfer Goals  Patient will transfer to toilet:  with modified independent     UE Exercise Program Goal  Patient will be supervision with left AROM/AAROM HEP (home exercise program).     New Goal 4/7/25:  PHQ9 screening    OT Session Time: 30 minutes  Self-Care Home Management: 20 minutes  Therapeutic Activity: 10 minutes

## 2025-04-10 NOTE — PROGRESS NOTES
[facility]   part of [facility]     Hospitalist Progress Note     Alisha Wilde Patient Status:  Inpatient    10/25/1965 MRN [id number]   Location [facility] 4SW-A Attending Danette Atwood, DO   Hosp Day # 11 PCP Bridger Avendano MD     Chief Complaint: Back pain, chest pain    Subjective:     Patient resting in chair with no complaints    Objective:    Review of Systems:   A comprehensive review of systems was completed; pertinent positive and negatives stated in subjective.    Vital signs:  Temp:  [97.4 °F (36.3 °C)-98.4 °F (36.9 °C)] 97.8 °F (36.6 °C)  Pulse:  [] 90  Resp:  [20-22] 20  BP: (132-178)/(61-90) 157/78  SpO2:  [94 %-100 %] 100 %    Physical Exam:    General: No acute distress  Respiratory: No wheezes, no rhonchi  Cardiovascular: S1, S2, regular rate and rhythm  Abdomen: Soft, Non-tender, non-distended, positive bowel sounds  Neuro: No new focal deficits.   Extremities: No edema    Diagnostic Data:    Labs:  Recent Labs   Lab 25  1203 25  2019 04/08/25  0441 04/09/25  0425 04/10/25  0514   WBC 14.1* 17.1* 12.9* 10.0 11.8*   HGB 8.0* 9.7* 9.1* 8.1* 8.8*   MCV 79.6* 79.2* 79.7* 80.2 78.9*   .0 176.0 182.0 227.0 310.0   INR  --  1.19  --   --   --        Recent Labs   Lab 04/08/25  0441 04/09/25  0425 04/10/25  0514   * 106* 111*   BUN 11 8* 7*   CREATSERUM 1.17* 1.06* 1.12*   CA 9.3 9.1 9.2   ALB 3.7 3.7 3.9    140 139   K 4.3  4.3 3.6 3.8  3.8    106 106   CO2 25.0 26.0 24.0   ALKPHO 64 58 61   AST 13 14 11   ALT <7* <7* <7*   BILT 0.4 0.4 0.4   TP 6.5 6.4 6.8       Estimated Glomerular Filtration Rate: 56 mL/min/1.73m2 (A) (result from lab).    No results for input(s): \"TROP\", \"TROPHS\", \"CK\" in the last 168 hours.    Recent Labs   Lab 25  2019   PTP 15.2*   INR 1.19                  Microbiology    No results found for this visit on 25.      Imaging: Reviewed in Epic.    Medications: Scheduled Medications[1]    Assessment & Plan:       #Aortic arch dissection with aneurysm  #Left renal artery dissection  -S/p left carotid to subclavian transposition, endovascular arch repair, stenting of the left renal, left iliac  -Repeat CTA 4/5 with some dissection within the left subclavian artery as well as some persistent flap with suboptimal expansion of the stent graft  -S/p thoracic stent graft, balloon angioplasty and stent right external iliac artery 4/7  -Plavix  -Vascular surgery following    #Prior hx of Type A aortic dissection  -S/p repair 11/2024  -Post op complications with rep failure and NAIMA needing trach and peg that are now removed     #Sepsis 2/2 aspiration pneumonia   #Acute hypoxic respiratory failure, resolved  #Moderate left pleural effusion with atelectasis/pneumonia  -IV antibiotics    #Pericardial fluid noted on CTA  -Monitor closely     #Ileus, resolved  -Bowel regimen     #LUE weakness   -Suspected 2/2 to dissection/surgery      #Hypertension  -Home BP meds on hold per vascular surgery recommendations    #Hyperlipidemia     #CKD stage 3     #TANYA     #Obesity, BMI 36    Danette Atwood DO    Supplementary Documentation:     Quality:  DVT Mechanical Prophylaxis:   SCDs, Early ambuation  DVT Pharmacologic Prophylaxis   Medication    enoxaparin (Lovenox) 40 MG/0.4ML SUBQ injection 40 mg                Code Status: Full Code  Webb: External urinary catheter in place  Webb Duration (in days):   Central line:    BRANDON:     Discharge is dependent on: Clinical improvement  At this point Ms. Wilde is expected to be discharge to: TBD    The 21st Century Cures Act makes medical notes like these available to patients in the interest of transparency. Please be advised this is a medical document. Medical documents are intended to carry relevant information, facts as evident, and the clinical opinion of the practitioner. The medical note is intended as peer to peer communication and may appear blunt or direct. It is written in medical language  and may contain abbreviations or verbiage that are unfamiliar.                         [1]    amLODIPine  5 mg Oral Daily    hydrALAZINE  25 mg Oral TID    carvedilol  25 mg Oral Daily with breakfast    piperacillin-tazobactam  3.375 g Intravenous Q8H    melatonin  3 mg Oral Nightly    enoxaparin  40 mg Subcutaneous Daily    clopidogrel  75 mg Oral Daily    ipratropium-albuterol  3 mL Nebulization Q6H WA    pantoprazole  40 mg Intravenous Q24H    multivitamin  1 tablet Oral Daily    docusate sodium  100 mg Oral BID    polyethylene glycol (PEG 3350)  17 g Oral Daily

## 2025-04-11 PROBLEM — I45.5 SINUS PAUSE: Status: ACTIVE | Noted: 2025-04-11

## 2025-04-11 LAB
ALBUMIN SERPL-MCNC: 3.8 G/DL (ref 3.2–4.8)
ALBUMIN/GLOB SERPL: 1.3 {RATIO} (ref 1–2)
ALP LIVER SERPL-CCNC: 58 U/L (ref 50–130)
ALT SERPL-CCNC: <7 U/L (ref 10–49)
ANION GAP SERPL CALC-SCNC: 9 MMOL/L (ref 0–18)
AST SERPL-CCNC: 11 U/L (ref ?–34)
BASOPHILS # BLD AUTO: 0.05 X10(3) UL (ref 0–0.2)
BASOPHILS NFR BLD AUTO: 0.5 %
BILIRUB SERPL-MCNC: 0.4 MG/DL (ref 0.2–1.1)
BUN BLD-MCNC: 8 MG/DL (ref 9–23)
CALCIUM BLD-MCNC: 9.3 MG/DL (ref 8.7–10.6)
CHLORIDE SERPL-SCNC: 106 MMOL/L (ref 98–112)
CO2 SERPL-SCNC: 24 MMOL/L (ref 21–32)
CREAT BLD-MCNC: 1.13 MG/DL (ref 0.55–1.02)
EGFRCR SERPLBLD CKD-EPI 2021: 55 ML/MIN/1.73M2 (ref 60–?)
EOSINOPHIL # BLD AUTO: 0.28 X10(3) UL (ref 0–0.7)
EOSINOPHIL NFR BLD AUTO: 2.7 %
ERYTHROCYTE [DISTWIDTH] IN BLOOD BY AUTOMATED COUNT: 17.4 %
GLOBULIN PLAS-MCNC: 2.9 G/DL (ref 2–3.5)
GLUCOSE BLD-MCNC: 106 MG/DL (ref 70–99)
HCT VFR BLD AUTO: 27 % (ref 35–48)
HGB BLD-MCNC: 8.5 G/DL (ref 12–16)
IMM GRANULOCYTES # BLD AUTO: 0.14 X10(3) UL (ref 0–1)
IMM GRANULOCYTES NFR BLD: 1.4 %
LYMPHOCYTES # BLD AUTO: 1.39 X10(3) UL (ref 1–4)
LYMPHOCYTES NFR BLD AUTO: 13.5 %
MAGNESIUM SERPL-MCNC: 2.2 MG/DL (ref 1.6–2.6)
MCH RBC QN AUTO: 25.5 PG (ref 26–34)
MCHC RBC AUTO-ENTMCNC: 31.5 G/DL (ref 31–37)
MCV RBC AUTO: 81.1 FL (ref 80–100)
MONOCYTES # BLD AUTO: 1.36 X10(3) UL (ref 0.1–1)
MONOCYTES NFR BLD AUTO: 13.2 %
NEUTROPHILS # BLD AUTO: 7.09 X10 (3) UL (ref 1.5–7.7)
NEUTROPHILS # BLD AUTO: 7.09 X10(3) UL (ref 1.5–7.7)
NEUTROPHILS NFR BLD AUTO: 68.7 %
OSMOLALITY SERPL CALC.SUM OF ELEC: 287 MOSM/KG (ref 275–295)
PHOSPHATE SERPL-MCNC: 4 MG/DL (ref 2.4–5.1)
PLATELET # BLD AUTO: 348 10(3)UL (ref 150–450)
POTASSIUM SERPL-SCNC: 4.2 MMOL/L (ref 3.5–5.1)
POTASSIUM SERPL-SCNC: 4.2 MMOL/L (ref 3.5–5.1)
PROT SERPL-MCNC: 6.7 G/DL (ref 5.7–8.2)
RBC # BLD AUTO: 3.33 X10(6)UL (ref 3.8–5.3)
SODIUM SERPL-SCNC: 139 MMOL/L (ref 136–145)
WBC # BLD AUTO: 10.3 X10(3) UL (ref 4–11)

## 2025-04-11 PROCEDURE — 99232 SBSQ HOSP IP/OBS MODERATE 35: CPT | Performed by: STUDENT IN AN ORGANIZED HEALTH CARE EDUCATION/TRAINING PROGRAM

## 2025-04-11 RX ORDER — ASPIRIN 81 MG/1
81 TABLET ORAL DAILY
Qty: 30 TABLET | Refills: 0 | Status: SHIPPED | OUTPATIENT
Start: 2025-04-11 | End: 2025-05-11

## 2025-04-11 RX ORDER — HYDRALAZINE HYDROCHLORIDE 50 MG/1
50 TABLET, FILM COATED ORAL 3 TIMES DAILY
Status: DISCONTINUED | OUTPATIENT
Start: 2025-04-11 | End: 2025-04-11

## 2025-04-11 RX ORDER — CLOPIDOGREL BISULFATE 75 MG/1
75 TABLET ORAL DAILY
Qty: 30 TABLET | Refills: 0 | Status: SHIPPED | OUTPATIENT
Start: 2025-04-12 | End: 2025-05-12

## 2025-04-11 RX ORDER — ASPIRIN 81 MG/1
81 TABLET ORAL DAILY
Status: DISCONTINUED | OUTPATIENT
Start: 2025-04-11 | End: 2025-04-15

## 2025-04-11 RX ORDER — AMLODIPINE BESYLATE 5 MG/1
10 TABLET ORAL DAILY
Status: DISCONTINUED | OUTPATIENT
Start: 2025-04-12 | End: 2025-04-15

## 2025-04-11 RX ORDER — CARVEDILOL 12.5 MG/1
37.5 TABLET ORAL
Status: DISCONTINUED | OUTPATIENT
Start: 2025-04-12 | End: 2025-04-11

## 2025-04-11 RX ORDER — AMLODIPINE BESYLATE 5 MG/1
5 TABLET ORAL ONCE
Status: DISCONTINUED | OUTPATIENT
Start: 2025-04-11 | End: 2025-04-11

## 2025-04-11 RX ORDER — HYDRALAZINE HYDROCHLORIDE 25 MG/1
25 TABLET, FILM COATED ORAL 3 TIMES DAILY
Status: DISCONTINUED | OUTPATIENT
Start: 2025-04-11 | End: 2025-04-12

## 2025-04-11 RX ORDER — HYDRALAZINE HYDROCHLORIDE 25 MG/1
25 TABLET, FILM COATED ORAL ONCE
Status: DISCONTINUED | OUTPATIENT
Start: 2025-04-11 | End: 2025-04-11

## 2025-04-11 RX ORDER — ASPIRIN 81 MG/1
81 TABLET, CHEWABLE ORAL DAILY
Status: DISCONTINUED | OUTPATIENT
Start: 2025-04-11 | End: 2025-04-11

## 2025-04-11 NOTE — CONSULTS
[facility]  Cardiology Consultation    Alisha Wilde Patient Status:  Inpatient    10/25/1965 MRN [id number]   Location [facility] 8NE-A Attending Danette Atwood, DO   Hosp Day # 12 PCP Bridger Avendano MD     Reason for Consultation:  Cardiac pause 4.4 sec    History of Present Illness:  Alisha Wilde is a a(n) 59 year old female with complex cardiac hx s/p ascending aortic aneurysm repair last year, HTN, HLP, obesity, CKD and EtOH abuse who is now S/p endoleak repair of thoracic stent graft.  We were asked to see for 4.4 second pause last night around 10 pm.  Patient was likely asleep and asymptomatic.  On oral BB - which for now has been help.  BP parameters 120-160 mmHg for SBP.    This AM when I saw her patient denied CP or SOB.  She did not feel near-syncopal or syncopal overnight.      Telemetry tracing reviewed with EP colleagues      Pre-Operative Diagnosis: Endoleak of thoracic stent graft     Post-Operative Diagnosis: Endoleak of thoracic stent graft     Procedure Performed:  Thoracic stent graft   Balloon angioplasty and stent right external iliac artery      Surgeons and Role:     * Magdy Palm MD - Primary     * Frank Holder MD - Assisting Surgeon     Assistant(s):        Surgical Findings:   Resolution of endoleak /graft infolding        History:  Past Medical History[1]  Past Surgical History[2]  Family History[3]   reports that she quit smoking about 26 years ago. Her smoking use included cigarettes. She started smoking about 39 years ago. She has a 13 pack-year smoking history. She has quit using smokeless tobacco. She reports that she does not currently use alcohol after a past usage of about 1.0 - 2.0 standard drink of alcohol per week. She reports that she does not use drugs.    Allergies:  Allergies[4]    Medications:  Current Hospital Medications[5]    Review of Systems:  A comprehensive review of systems was negative if not otherwise mention in above HPI.    BP (!) 177/82 (BP  Location: Left arm)   Pulse 90   Temp 98.2 °F (36.8 °C) (Oral)   Resp 20   Wt 208 lb 15.9 oz   SpO2 97%   BMI 34.78 kg/m²   Temp (24hrs), Av.1 °F (36.7 °C), Min:97.3 °F (36.3 °C), Max:98.7 °F (37.1 °C)       Intake/Output Summary (Last 24 hours) at 2025 0911  Last data filed at 2025 0729  Gross per 24 hour   Intake 360 ml   Output 950 ml   Net -590 ml     Wt Readings from Last 3 Encounters:   25 208 lb 15.9 oz   25 210 lb   25 230 lb       Physical Exam:   General: Alert and oriented x 3. No apparent distress. No respiratory or constitutional distress.  HEENT: Normocephalic, anicteric sclera, neck supple.  Neck: No JVD  Cardiac: Regular rate and rhythm. S1, S2 normal. No murmur  Lungs: Clear anteriorly  Extremities: Without edema.  Neurologic: Alert and oriented, normal affect.  Skin: Warm and dry.     Laboratory Data:  Lab Results   Component Value Date    WBC 10.3 2025    HGB 8.5 2025    HCT 27.0 2025    .0 2025    CREATSERUM 1.13 2025    BUN 8 2025     2025    K 4.2 2025    K 4.2 2025     2025    CO2 24.0 2025     2025    CA 9.3 2025    ALB 3.8 2025    ALKPHO 58 2025    BILT 0.4 2025    TP 6.7 2025    AST 11 2025    ALT <7 2025    MG 2.2 2025    PHOS 4.0 2025       Imaging:  Echo 2025:  1. Left ventricle: The cavity size was normal. Wall thickness was at the      upper limits of normal. Systolic function was normal. The estimated      ejection fraction was 60-65%, by visual assessment. Left ventricular      diastolic function parameters were normal for the patient's age.   2. Left atrium: The left atrial volume was mildly increased.   3. Pulmonary arteries: Systolic pressure was at the upper limits of normal      to, at most, mildly increased; in the range of 30 mm Hg to 35 mm Hg.   Impressions:  This study is compared  with previous dated 2024: No   significant change since prior study.   *     Impression:  Active Problems:    Benign essential HTN    Dissection of aorta, unspecified portion of aorta (HCC)    Aneurysm of aortic arch    Vagally mediated sinus pause post-anesthesia and presumed during sleep hours  HTN   HLP  CKD  Hx of aortic dissection s/p repair  Endoleak repair during this admission    Recommendations:  - hold AV adilene blocking agents  - uptitrate amlodipine and use oral hydralazine q8 hrs if needed for HTN  - no indication for PPM at this point  - at hospital discharge can have 7 day MCT and follow-up with EP    Thank you for allowing me to participate in the care of your patient.    Zeke Fierro MD  2025  9:11 AM         [1]   Past Medical History:   Chronic kidney disease (CKD)    Esophageal reflux    High blood pressure    High cholesterol    HYPERTENSION    Hypertension    Unspecified essential hypertension   [2]   Past Surgical History:  Procedure Laterality Date    Anesth,open heart surgery  2024    Colonoscopy N/A 2018    Procedure: COLONOSCOPY;  Surgeon: Magdy Webber MD;  Location: [facility] ENDOSCOPY    Endometrial ablation      child passed away for 5 mins          1    Other surgical history  2011    cysto-Dr. Lacey -- pt denies   [3]   Family History  Problem Relation Age of Onset    Hypertension Mother     Heart Disorder Mother 70    Other (Other) Mother         kidney failure    Hypertension Father     Hypertension Maternal Grandfather     Cancer Neg     Diabetes Neg     Glaucoma Neg     Macular degeneration Neg    [4]   Allergies  Allergen Reactions    Atorvastatin MYALGIA    Pravastatin MYALGIA    Rosuvastatin MYALGIA    Seasonal Runny nose   [5]   Current Facility-Administered Medications:     amLODIPine (Norvasc) tab 5 mg, 5 mg, Oral, Daily    hydrALAZINE (Apresoline) tab 25 mg, 25 mg, Oral, TID    carvedilol (Coreg) tab 25 mg, 25 mg, Oral, Daily  with breakfast    pantoprazole (Protonix) DR tab 40 mg, 40 mg, Oral, QAM AC    piperacillin-tazobactam (Zosyn) 3.375 g in dextrose 5% 100 mL IVPB-ADDV, 3.375 g, Intravenous, Q8H    melatonin tab 3 mg, 3 mg, Oral, Nightly    iodixanol (VISIPAQUE) 320 MG/ML injection 100 mL, 100 mL, Injection, ONCE PRN    acetaminophen (Tylenol) tab 650 mg, 650 mg, Oral, Q4H PRN **OR** HYDROcodone-acetaminophen (Norco) 5-325 MG per tab 1 tablet, 1 tablet, Oral, Q4H PRN **OR** HYDROcodone-acetaminophen (Norco) 5-325 MG per tab 2 tablet, 2 tablet, Oral, Q4H PRN    ondansetron (Zofran) 4 MG/2ML injection 4 mg, 4 mg, Intravenous, Q6H PRN    prochlorperazine (Compazine) 10 MG/2ML injection 5 mg, 5 mg, Intravenous, Q8H PRN    enoxaparin (Lovenox) 40 MG/0.4ML SUBQ injection 40 mg, 40 mg, Subcutaneous, Daily    metoprolol (Lopressor) 5 mg/5mL injection 2.5 mg, 2.5 mg, Intravenous, Q20 Min PRN    nitroGLYCERIN in dextrose 5% 50 mg/250mL infusion premix, 5-400 mcg/min, Intravenous, Continuous PRN    clopidogrel (Plavix) tab 75 mg, 75 mg, Oral, Daily    ipratropium-albuterol (Duoneb) 0.5-2.5 (3) MG/3ML inhalation solution 3 mL, 3 mL, Nebulization, Q6H WA    bisacodyl (Dulcolax) 10 MG rectal suppository 10 mg, 10 mg, Rectal, Daily PRN    multivitamin (Tab-A-Treasure/Beta Carotene) tab 1 tablet, 1 tablet, Oral, Daily    simethicone (Mylicon) chewable tab 160 mg, 160 mg, Oral, QID PRN    docusate sodium (Colace) cap 100 mg, 100 mg, Oral, BID    polyethylene glycol (PEG 3350) (Miralax) 17 g oral packet 17 g, 17 g, Oral, Daily

## 2025-04-11 NOTE — PLAN OF CARE
No acute distress noted ,restnig  Problem: Diabetes/Glucose Control  Goal: Glucose maintained within prescribed range  Description: INTERVENTIONS:- Monitor Blood Glucose as ordered- Assess for signs and symptoms of hyperglycemia and hypoglycemia- Administer ordered medications to maintain glucose within target range- Assess barriers to adequate nutritional intake and initiate nutrition consult as needed- Instruct patient on self management of diabetes  Outcome: Progressing     Problem: Patient/Family Goals  Goal: Patient/Family Long Term Goal  Description: Patient's Long Term Goal: To go home. Interventions:- PT/OT. Trend labs/vs. - See additional Care Plan goals for specific interventions  Outcome: Progressing  Goal: Patient/Family Short Term Goal  Description: Patient's Short Term Goal: Pain management. Interventions: - Take pain meds prn. Reposition. - See additional Care Plan goals for specific interventions  Outcome: Progressing     Problem: CARDIOVASCULAR - ADULT  Goal: Absence of cardiac arrhythmias or at baseline  Description: INTERVENTIONS:- Continuous cardiac monitoring, monitor vital signs, obtain 12 lead EKG if indicated- Evaluate effectiveness of antiarrhythmic and heart rate control medications as ordered- Initiate emergency measures for life threatening arrhythmias- Monitor electrolytes and administer replacement therapy as ordered  Outcome: Progressing

## 2025-04-11 NOTE — OPERATIVE REPORT
Vascular Surgery Op Note  Patients Name:    Alisha Wilde    Operating Physician: Magdy Palm MD  CSN:    [id number]                                                           Location:  OR  MRN:     [id number]                                            YOB: 1965    Operation Date:     04/07/2025         Pre-Operative Diagnosis:   1. Thoracic graft stenosis     Post-Operative Diagnosis:   1. Thoracic graft stenosis        Procedure Performed:   US guided access of the bilateral common femoral arteries.  Introduction of catheter into aorta.  Intravascular ultrasound of the right external iliac artery, right common iliac artery and aorta, with radiologic supervision and interpretation.  Thoracic endovascular aortic repair with placement of proximal extension after initial stent placement with placement of 40mm Cook Zenith dissection endograft  Angioplasty and stenting of the right common and external iliac arteries with placement of 10 mm protégé stent postdilated with 10 mm balloon.  Angioplasty of the left common and external iliac arteries with placement of 10 mm balloon  Closure of right groin with Pro-glide Perclose suture (22 Fr right, 6 Fr Left).       Surgeon:  Magdy Palm MD    Assistant:  Frank Holder MD    Anesthesia:   General       EBL: 20cc     Complications: None    Drains: None     Findings: Infolding of proximal thoracic endograft.  Additional thoracic stent placement with angioplasty performed with complete resolution confirmed with intravascular ultrasound.  Dissection of the common to external iliac arteries and subsequent angioplasty and stenting performed with complete resolution.  Wide patency at completion.  Strong multiphasic signals in the bilateral lower extremities.     Indications for procedure: 59-year-old female who underwent extensive endovascular aortic repair a redo nature of the arch with thoracic stent placement for repair of her dissection after the  thoracoabdominal aorta.  She was recovering well and underwent CTA that revealed infolding of the proximal graft as well as some iliac stenosis.  As such she was recommended for completion of her repair.  The risk benefits of the procedure were discussed.  Informed consent was directly obtained.  On the afternoon of April 07, the patient was brought into the braiding room and placed in supine position.  Conscious sedation was initiated without any issues.  The patient was subsequently prepped and draped in the usual sterile fashion.  Time was performed.  With the use the ultrasound I accessed the right common femoral with advancement of a microwire and exchanged for a micro sheath.  A Glidewire advantage was then put into the aorta followed by placement of a 6 Swedish sheath.  I then deployed 2 Pro-glide Perclose sutures in the 10 and 2 o'clock position followed by placement of an 8 Swedish sheath.  Similarly I accessed the left femoral artery with placement of a 6 Swedish sheath.  The patient was systemically heparinized.  I advanced the wire and catheter through the iliac segment and then advanced intravascular ultrasound through the right external iliac, right common iliac into the aorta.  I identified the area of the infolding and this was marked in the thoracic aorta.  I then upsized to a 22 Swedish sheath and then advanced the Cook dissection graft and deployed the thoracic stent without any issues with proximal extension with placement of the 40 mm graft.  I then performed angioplasty of this with a coda balloon.  I then repeated intravascular ultrasound to the right external iliac, right common iliac and aorta and confirmed complete resolution proximally.  However there was dissection within the external iliac arteries.  There was also concern of the iliac stents that were in place from the initial operation.  On the left side I placed a 10 mm balloon up for protection of the iliac and then deployed a 10 mm  protégé stent in the distal right common through the external iliac and then performed angioplasty in kissing fashion with 10 mm balloon.  Repeat intravascular ultrasound from revealed complete resolution and aortogram revealed wide patency without stenosis.  At this point I was satisfied and opted to terminate the procedure.  All catheters were withdrawn.  I then since the Pro-glide Perclose sutures on the right which were cinched locked and cut thereby closing the arteriotomy site.  An additional Perclose were placed on the left.  Topical hemostatic agents were placed.  Protamine was administered.  Upon completion there was evidence of excellent hemostasis and a sterile dressing was applied.  The patient awoke from anesthesia was extubated neurologically intact and taken to the ICU in stable condition.  All counts were correct at the end of the case.       Magdy Palm MD

## 2025-04-11 NOTE — PROGRESS NOTES
[facility]   part of [facility] Progress Note     Alisha Wilde Patient Status:  Inpatient    10/25/1965 MRN [id number]   Location [facility] 4SW-A Attending Danette Atwood, DO   Hosp Day # 12 PCP Bridger Avendano MD     Chief Complaint: Back pain, chest pain    Subjective:     Patient had 4.4 second pause on telemetry overnight. Patient resting in chair with no complaints    Objective:    Review of Systems:   A comprehensive review of systems was completed; pertinent positive and negatives stated in subjective.    Vital signs:  Temp:  [97.3 °F (36.3 °C)-98.7 °F (37.1 °C)] 98.2 °F (36.8 °C)  Pulse:  [80-95] 90  Resp:  [18-20] 20  BP: (150-177)/(78-93) 177/82  SpO2:  [97 %-100 %] 97 %    Physical Exam:    General: No acute distress  Respiratory: No wheezes, no rhonchi  Cardiovascular: S1, S2, regular rate and rhythm  Abdomen: Soft, Non-tender, non-distended, positive bowel sounds  Neuro: No new focal deficits.   Extremities: No edema    Diagnostic Data:    Labs:  Recent Labs   Lab 04/07/25  2019 04/08/25  0441 04/09/25  0425 04/10/25  0514 04/11/25  0446   WBC 17.1* 12.9* 10.0 11.8* 10.3   HGB 9.7* 9.1* 8.1* 8.8* 8.5*   MCV 79.2* 79.7* 80.2 78.9* 81.1   .0 182.0 227.0 310.0 348.0   INR 1.19  --   --   --   --        Recent Labs   Lab 04/09/25  0425 04/10/25  0514 04/11/25  0446   * 111* 106*   BUN 8* 7* 8*   CREATSERUM 1.06* 1.12* 1.13*   CA 9.1 9.2 9.3   ALB 3.7 3.9 3.8    139 139   K 3.6 3.8  3.8 4.2  4.2    106 106   CO2 26.0 24.0 24.0   ALKPHO 58 61 58   AST 14 11 11   ALT <7* <7* <7*   BILT 0.4 0.4 0.4   TP 6.4 6.8 6.7       Estimated Glomerular Filtration Rate: 55 mL/min/1.73m2 (A) (result from lab).    No results for input(s): \"TROP\", \"TROPHS\", \"CK\" in the last 168 hours.    Recent Labs   Lab 25  2019   PTP 15.2*   INR 1.19                  Microbiology    No results found for this visit on 25.      Imaging: Reviewed in Epic.    Medications:  Scheduled Medications[1]    Assessment & Plan:      #Sinus pause  -Patient had 4.4 second pause on telemetry overnight  -Cardiology following    #Aortic arch dissection with aneurysm  #Left renal artery dissection  -S/p left carotid to subclavian transposition, endovascular arch repair, stenting of the left renal, left iliac  -Repeat CTA 4/5 with some dissection within the left subclavian artery as well as some persistent flap with suboptimal expansion of the stent graft  -S/p thoracic stent graft, balloon angioplasty and stent right external iliac artery 4/7  -Lifelong aspirin Plavix  -Vascular surgery following    #Hypertension  -Blood pressure medications resumed yesterday at half dose  -Blood pressure remains uncontrolled  -Increase amlodipine, hydralazine, carvedilol to home doses    #Prior hx of Type A aortic dissection  -S/p repair 11/2024  -Post op complications with rep failure and NAIMA needing trach and peg that are now removed     #Sepsis 2/2 aspiration pneumonia   #Acute hypoxic respiratory failure, resolved  #Moderate left pleural effusion with atelectasis/pneumonia  -IV antibiotics    #Pericardial fluid noted on CTA  -Monitor closely     #Ileus, resolved  -Bowel regimen     #LUE weakness   -Suspected 2/2 to dissection/surgery     #Hyperlipidemia     #CKD stage 3     #TANYA     #Obesity, BMI 36    Accepted at [facility] for acute rehab    Danette Atwood DO    Supplementary Documentation:     Quality:  DVT Mechanical Prophylaxis:   SCDs, Early ambuation  DVT Pharmacologic Prophylaxis   Medication    enoxaparin (Lovenox) 40 MG/0.4ML SUBQ injection 40 mg         DVT Pharmacologic prophylaxis: Aspirin 81 mg      Code Status: Full Code  Webb: External urinary catheter in place  Webb Duration (in days):   Central line:    BRANDON: 4/11/2025    Discharge is dependent on: Clinical improvement  At this point Ms. Wilde is expected to be discharge to: TBD    The 21st Century Cures Act makes medical notes like these  available to patients in the interest of transparency. Please be advised this is a medical document. Medical documents are intended to carry relevant information, facts as evident, and the clinical opinion of the practitioner. The medical note is intended as peer to peer communication and may appear blunt or direct. It is written in medical language and may contain abbreviations or verbiage that are unfamiliar.                         [1]    aspirin  81 mg Oral Daily    amLODIPine  5 mg Oral Daily    hydrALAZINE  25 mg Oral TID    carvedilol  25 mg Oral Daily with breakfast    pantoprazole  40 mg Oral QAM AC    piperacillin-tazobactam  3.375 g Intravenous Q8H    melatonin  3 mg Oral Nightly    enoxaparin  40 mg Subcutaneous Daily    clopidogrel  75 mg Oral Daily    ipratropium-albuterol  3 mL Nebulization Q6H WA    multivitamin  1 tablet Oral Daily    docusate sodium  100 mg Oral BID    polyethylene glycol (PEG 3350)  17 g Oral Daily

## 2025-04-11 NOTE — PLAN OF CARE
Patient alert and oriented x 4. On Cpap during sleep , sinus on cardiac monitor. Patient denies any chest pain or chest discomfort at this time. Patient voids, last BM 4/9. Noro helped lt shoulder pain, no acute distress noted . Plan of care updated, all questions answered. Safety precautions in place. Bed alarm on. Will continue to monitor tele/labs/vital signs closely.     Plan:   acute rehab [facility] (approved, discharge plan  Problem: Diabetes/Glucose Control  Goal: Glucose maintained within prescribed range  Description: INTERVENTIONS:- Monitor Blood Glucose as ordered- Assess for signs and symptoms of hyperglycemia and hypoglycemia- Administer ordered medications to maintain glucose within target range- Assess barriers to adequate nutritional intake and initiate nutrition consult as needed- Instruct patient on self management of diabetes  4/11/2025 0655 by Dennise Anaya RN  Outcome: Progressing  4/11/2025 0442 by Dennise Anaya RN  Outcome: Progressing     Problem: Patient/Family Goals  Goal: Patient/Family Long Term Goal  Description: Patient's Long Term Goal: To go home. Interventions:- PT/OT. Trend labs/vs. - See additional Care Plan goals for specific interventions  4/11/2025 0655 by Dennise Anaya RN  Outcome: Progressing  4/11/2025 0442 by Dennise Anaya RN  Outcome: Progressing  Goal: Patient/Family Short Term Goal  Description: Patient's Short Term Goal: Pain management. Interventions: - Take pain meds prn. Reposition. - See additional Care Plan goals for specific interventions  4/11/2025 0655 by Dennise Anaya RN  Outcome: Progressing  4/11/2025 0442 by Dennise Anaya RN  Outcome: Progressing     Problem: CARDIOVASCULAR - ADULT  Goal: Absence of cardiac arrhythmias or at baseline  Description: INTERVENTIONS:- Continuous cardiac monitoring, monitor vital signs, obtain 12 lead EKG if indicated- Evaluate effectiveness of antiarrhythmic and heart rate control medications as  ordered- Initiate emergency measures for life threatening arrhythmias- Monitor electrolytes and administer replacement therapy as ordered  4/11/2025 0655 by Dennise Anaya RN  Outcome: Progressing  4/11/2025 0442 by Dennise Anaya RN  Outcome: Progressing

## 2025-04-11 NOTE — PLAN OF CARE
Assumed care at 0730; Pt A/o x 4, stable VS and no complaints of pain. Saturating well on RA; Plan of care discussed with patient. Blood pressure within goal. Educated on fall risk and use of call light. Belongings and call light within reach. Bed/chair alarm activated; Bed at lowest position and locked.     Problem: Diabetes/Glucose Control  Goal: Glucose maintained within prescribed range  Description: INTERVENTIONS:- Monitor Blood Glucose as ordered- Assess for signs and symptoms of hyperglycemia and hypoglycemia- Administer ordered medications to maintain glucose within target range- Assess barriers to adequate nutritional intake and initiate nutrition consult as needed- Instruct patient on self management of diabetes  Outcome: Progressing     Problem: Patient/Family Goals  Goal: Patient/Family Long Term Goal  Description: Patient's Long Term Goal: To go home. Interventions:- PT/OT. Trend labs/vs. - See additional Care Plan goals for specific interventions  Outcome: Progressing  Goal: Patient/Family Short Term Goal  Description: Patient's Short Term Goal: Pain management. Interventions: - Take pain meds prn. Reposition. - See additional Care Plan goals for specific interventions  Outcome: Progressing     Problem: CARDIOVASCULAR - ADULT  Goal: Absence of cardiac arrhythmias or at baseline  Description: INTERVENTIONS:- Continuous cardiac monitoring, monitor vital signs, obtain 12 lead EKG if indicated- Evaluate effectiveness of antiarrhythmic and heart rate control medications as ordered- Initiate emergency measures for life threatening arrhythmias- Monitor electrolytes and administer replacement therapy as ordered  Outcome: Progressing

## 2025-04-11 NOTE — PHYSICAL THERAPY NOTE
PHYSICAL THERAPY TREATMENT NOTE - INPATIENT    Room Number: 8609/8609-A       Session: 4    Number of Visits to Meet Established Goals: 5    Presenting Problem: Endovascular repair of aortic arch 4/2  Co-Morbidities : CKD, previous aneurysm repair, DM, HTN, HL, obesity    History related to current admission: Patient is a 59 year old female admitted on 3/30/2025 from home for several week c/o of chest and subscapular pain.  Pt diagnosed with aortic arch aneurysm.  Pt is now s/p LEFT CAROTID TO SUBCLAVIAN TRANSPOSITION, ENDOVASCULAR ARCH REPAIR. STENTING OF THE LEFT RENAL, LEFT ILIAC - per chart on 4/2  Pt w/ additional procedure on 4/7 - thoracic endograft repair for endoleak     HOME SITUATION  Type of Home: Condo  Home Layout: One level  Stairs to Enter : 0        Stairs to Bedroom: 0         Lives With: Alone    Drives: Yes           Prior Level of [place]: Pt is typically independent w/ adl's, gait w/ cane, drives and is currently on short term disability w/ [employer].    PHYSICAL THERAPY ASSESSMENT   Patient demonstrates fair progress this session, goals  remain in progress.      Patient is requiring minimal assist as a result of the following impairments: decreased functional strength, decreased endurance/aerobic capacity, impaired standing balance, decreased muscular endurance, medical status, and limited L elbow and shoulder active ROM.     Patient continues to function below baseline with bed mobility, transfers, gait, and performing household tasks.  Next session anticipate patient to progress bed mobility, transfers, and gait.  Physical Therapy will continue to follow patient for duration of hospitalization.    Patient continues to benefit from continued skilled PT services: to facilitate return to prior level of function as patient demonstrates high motivation with excellent tolerance to an intensive therapy program .  Pt w/ new L shoulder and biceps weakness and pain since this admission limiting some  of her functional activity.  Pt relying on rw for gait which is not her baseline. Pt lives alone and had been independent prior to admission (used a cane).  Pt is not near her baseline level at this time.    PLAN DURING HOSPITALIZATION  Nursing Mobility Recommendation : 1 Assist  PT Device Recommendation: Rolling walker  PT Treatment Plan: Bed mobility, Endurance, Patient education, Family education, Gait training, Range of motion, Transfer training, Balance training  Frequency (Obs): 3-5x/week     CURRENT GOALS     Goal #1 Patient is able to demonstrate supine - sit EOB @ level: modified independent      Goal #2 Patient is able to demonstrate transfers EOB to/from [facility] at assistance level: modified independent      Goal #3 Patient is able to ambulate 250 feet with assist device:  least restrictive device  at assistance level: supervision      Goal #4     Goal #5     Goal #6     Goal Comments: Goals established on 4/3/2025  2025 all goals ongoing    SUBJECTIVE  \"I guess I had a pause overnight\"     OBJECTIVE  Precautions: Drain(s)    WEIGHT BEARING RESTRICTION     PAIN ASSESSMENT   Ratin  Location: L shoulder  Management Techniques: Repositioning, Body mechanics, Breathing techniques, Relaxation    BALANCE                                                                                                                       Static Sitting: Fair  Dynamic Sitting: Fair           Static Standing: Fair -  Dynamic Standing: Poor +    ACTIVITY TOLERANCE                         O2 WALK  Oxygen Therapy  SPO2% on Room Air at Rest: 95    AM-PAC '6-Clicks' INPATIENT SHORT FORM - BASIC MOBILITY  How much difficulty does the patient currently have...  Patient Difficulty: Turning over in bed (including adjusting bedclothes, sheets and blankets)?: None   Patient Difficulty: Sitting down on and standing up from a chair with arms (e.g., wheelchair, bedside commode, etc.): A Little   Patient Difficulty: Moving from lying on  back to sitting on the side of the bed?: A Little   How much help from another person does the patient currently need...   Help from Another: Moving to and from a bed to a chair (including a wheelchair)?: A Little   Help from Another: Need to walk in hospital room?: A Little   Help from Another: Climbing 3-5 steps with a railing?: A Little     AM-PAC Score:  Raw Score: 19   Approx Degree of Impairment: 41.77%   Standardized Score (AM-PAC Scale): 45.44   CMS Modifier (G-Code): CK    FUNCTIONAL ABILITY STATUS  Gait Assessment   Functional Mobility/Gait Assessment  Gait Assistance: Contact guard assist  Distance (ft): 125  Assistive Device: Rolling walker  Pattern: Shuffle    Skilled Therapy Provided    Bed Mobility:  Rolling:    Supine<>Sit: NT   Sit<>Supine: NT    Transfer Mobility:  Sit<>Stand: CGA  Gait: CGA with RW - plan to bring straight cane next session to trial device     Therapist's Comments: BP remains elevated - MD aware.  Pt denied dizziness or HA with activity.     -110bpm.     Patient End of Session: Up in chair, Needs met, Call light within reach, RN aware of session/findings, All patient questions and concerns addressed, Hospital anti-slip socks    PT Session Time: 23 minutes  Gait Training: 15 minutes  Therapeutic Activity: 8 minutes  Therapeutic Exercise: 0 minutes   Neuromuscular Re-education: 0 minutes

## 2025-04-11 NOTE — PROGRESS NOTES
[facility]   part of [facility]     Vascular Surgery Progress Note    Alisha Wilde Patient Status:  Inpatient    10/25/1965 MRN [id number]   Location [facility] 8NE-A Attending Danette Atwood, DO   Hosp Day # 12 PCP Bridger Avendano MD     Objective:   Temp: 98.2 °F (36.8 °C)  Pulse: 90  Resp: 20  BP: 177/82      Events Yesterday/Overnight:  Patient is a 59 year old female, POD 4 thoracic stent graft with balloon angioplasty and stent of the right external iliac artery with Dr. Holder. Patient previously underwent a left carotid to subclavian transposition, endovascular arch repair, stenting of the left renal and iliac arteries with Dr. Holder on . Patient is doing well. /82. Hemoglobin 8.5. WBC 10.3. Eating and drinking without difficulty. Patient is up in the chair this morning. Patient states left arm abduction has improved, she now finds flexion difficult. She has been accept to [facility] for rehab.     Exam:  Left neck incision site flat, drain previously removed. Bilateral groins soft, no hematoma, no drainage present.  Palpable bilateral DP and PT pulses. Neurologically intact.  Patient able to move the bilateral upper and lower extremities. Decreased left upper extremity flexion compared to the right. Palpable bilateral radial pulses.     Impression/Plan:      Continue physical therapy.    Will need lifelong aspirin and plavix.    Continue nutritional supplements.    Follow up in 2 weeks with NP.    Can discharge to [facility] from a vascular surgery standpoint.      Medications:  Current Hospital Medications[1]    Laboratory/Data:    LABS:  Recent Labs   Lab 04/07/25  2019 04/08/25  0441 04/09/25  0425 04/10/25  0514 25  0446   WBC 17.1* 12.9* 10.0 11.8* 10.3   HGB 9.7* 9.1* 8.1* 8.8* 8.5*   MCV 79.2* 79.7* 80.2 78.9* 81.1   .0 182.0 227.0 310.0 348.0   INR 1.19  --   --   --   --        Recent Labs   Lab 04/07/25  2019 04/08/25  0441 04/09/25  0425 04/10/25  0514  04/11/25  0446   * 139 140 139 139   K 3.8 4.3  4.3 3.6 3.8  3.8 4.2  4.2    107 106 106 106   CO2 23.0 25.0 26.0 24.0 24.0   BUN 9 11 8* 7* 8*   CREATSERUM 1.07* 1.17* 1.06* 1.12* 1.13*   * 145* 106* 111* 106*   CA 8.9 9.3 9.1 9.2 9.3   MG 2.0 2.1 2.2 2.2 2.2   PHOS 2.4 4.0 2.5 3.0  3.0 4.0     Recent Labs   Lab 04/07/25  0612 04/07/25  1314 04/07/25 2019   PTP  --   --  15.2*   INR  --   --  1.19   PTT 41.9* 45.8* 30.0     Recent Labs   Lab 04/07/25 2019 04/08/25  0441 04/09/25  0425 04/10/25  0514 04/11/25 0446   ALT <7* <7* <7* <7* <7*   AST 15 13 14 11 11   ALB 3.7 3.7 3.7 3.9 3.8     No results for input(s): \"TROP\" in the last 168 hours.  Lab Results   Component Value Date    ANASCRN Negative 06/05/2021     No results for input(s): \"PCACT\", \"PSACT\", \"AT3ACT\", \"HIPAB\", \"PATHI\", \"STALA\", \"DRVVTRATIO\", \"DRVVT\", \"STACLOT\", \"CARIGG\", \"T6IP8PHDPN\", \"A2CC6GKCCH\", \"RA\", \"HAVIGM\", \"HBCIGM\", \"HCVAB\", \"HBSAG\", \"HBCAB\", \"HBVDNAINTERP\", \"ANAS\", \"C3\", \"C4\" in the last 168 hours.    Helen Moses, LEATHAN  4/11/2025  8:20 AM         [1]   Current Facility-Administered Medications   Medication Dose Route Frequency    amLODIPine (Norvasc) tab 5 mg  5 mg Oral Daily    hydrALAZINE (Apresoline) tab 25 mg  25 mg Oral TID    carvedilol (Coreg) tab 25 mg  25 mg Oral Daily with breakfast    pantoprazole (Protonix) DR tab 40 mg  40 mg Oral QAM AC    piperacillin-tazobactam (Zosyn) 3.375 g in dextrose 5% 100 mL IVPB-ADDV  3.375 g Intravenous Q8H    melatonin tab 3 mg  3 mg Oral Nightly    iodixanol (VISIPAQUE) 320 MG/ML injection 100 mL  100 mL Injection ONCE PRN    acetaminophen (Tylenol) tab 650 mg  650 mg Oral Q4H PRN    Or    HYDROcodone-acetaminophen (Norco) 5-325 MG per tab 1 tablet  1 tablet Oral Q4H PRN    Or    HYDROcodone-acetaminophen (Norco) 5-325 MG per tab 2 tablet  2 tablet Oral Q4H PRN    ondansetron (Zofran) 4 MG/2ML injection 4 mg  4 mg Intravenous Q6H PRN    prochlorperazine (Compazine) 10  MG/2ML injection 5 mg  5 mg Intravenous Q8H PRN    enoxaparin (Lovenox) 40 MG/0.4ML SUBQ injection 40 mg  40 mg Subcutaneous Daily    metoprolol (Lopressor) 5 mg/5mL injection 2.5 mg  2.5 mg Intravenous Q20 Min PRN    nitroGLYCERIN in dextrose 5% 50 mg/250mL infusion premix  5-400 mcg/min Intravenous Continuous PRN    clopidogrel (Plavix) tab 75 mg  75 mg Oral Daily    ipratropium-albuterol (Duoneb) 0.5-2.5 (3) MG/3ML inhalation solution 3 mL  3 mL Nebulization Q6H WA    bisacodyl (Dulcolax) 10 MG rectal suppository 10 mg  10 mg Rectal Daily PRN    multivitamin (Tab-A-Treasure/Beta Carotene) tab 1 tablet  1 tablet Oral Daily    simethicone (Mylicon) chewable tab 160 mg  160 mg Oral QID PRN    docusate sodium (Colace) cap 100 mg  100 mg Oral BID    polyethylene glycol (PEG 3350) (Miralax) 17 g oral packet 17 g  17 g Oral Daily

## 2025-04-11 NOTE — DISCHARGE INSTRUCTIONS
Discharge Instructions    Wash access sites daily with soap and water.  Call the clinic at [phone] with any signs/symptoms of infection.  Drink plenty of water and eat vegetables.  You can take a stool softener to avoid constipation.  Return to clinic in 2 weeks.  Walk daily.  Resume previous medications as directed.  Take aspirin 81 mg daily.  Take plavix 75 mg daily lifelong

## 2025-04-12 PROBLEM — R09.81 NASAL CONGESTION: Status: ACTIVE | Noted: 2025-04-12

## 2025-04-12 LAB
ALBUMIN SERPL-MCNC: 3.8 G/DL (ref 3.2–4.8)
ALBUMIN/GLOB SERPL: 1.3 {RATIO} (ref 1–2)
ALP LIVER SERPL-CCNC: 60 U/L (ref 50–130)
ALT SERPL-CCNC: <7 U/L (ref 10–49)
ANION GAP SERPL CALC-SCNC: 11 MMOL/L (ref 0–18)
AST SERPL-CCNC: 11 U/L (ref ?–34)
BASOPHILS # BLD AUTO: 0.03 X10(3) UL (ref 0–0.2)
BASOPHILS NFR BLD AUTO: 0.3 %
BILIRUB SERPL-MCNC: 0.4 MG/DL (ref 0.2–1.1)
BUN BLD-MCNC: 8 MG/DL (ref 9–23)
CALCIUM BLD-MCNC: 9.4 MG/DL (ref 8.7–10.6)
CHLORIDE SERPL-SCNC: 107 MMOL/L (ref 98–112)
CO2 SERPL-SCNC: 23 MMOL/L (ref 21–32)
CREAT BLD-MCNC: 1.04 MG/DL (ref 0.55–1.02)
EGFRCR SERPLBLD CKD-EPI 2021: 61 ML/MIN/1.73M2 (ref 60–?)
EOSINOPHIL # BLD AUTO: 0.19 X10(3) UL (ref 0–0.7)
EOSINOPHIL NFR BLD AUTO: 2 %
ERYTHROCYTE [DISTWIDTH] IN BLOOD BY AUTOMATED COUNT: 17.7 %
GLOBULIN PLAS-MCNC: 3 G/DL (ref 2–3.5)
GLUCOSE BLD-MCNC: 109 MG/DL (ref 70–99)
HCT VFR BLD AUTO: 26.9 % (ref 35–48)
HGB BLD-MCNC: 8.5 G/DL (ref 12–16)
IMM GRANULOCYTES # BLD AUTO: 0.11 X10(3) UL (ref 0–1)
IMM GRANULOCYTES NFR BLD: 1.2 %
LYMPHOCYTES # BLD AUTO: 1.02 X10(3) UL (ref 1–4)
LYMPHOCYTES NFR BLD AUTO: 10.9 %
MAGNESIUM SERPL-MCNC: 2.2 MG/DL (ref 1.6–2.6)
MCH RBC QN AUTO: 25.4 PG (ref 26–34)
MCHC RBC AUTO-ENTMCNC: 31.6 G/DL (ref 31–37)
MCV RBC AUTO: 80.3 FL (ref 80–100)
MONOCYTES # BLD AUTO: 1.06 X10(3) UL (ref 0.1–1)
MONOCYTES NFR BLD AUTO: 11.4 %
NEUTROPHILS # BLD AUTO: 6.92 X10 (3) UL (ref 1.5–7.7)
NEUTROPHILS # BLD AUTO: 6.92 X10(3) UL (ref 1.5–7.7)
NEUTROPHILS NFR BLD AUTO: 74.2 %
OSMOLALITY SERPL CALC.SUM OF ELEC: 291 MOSM/KG (ref 275–295)
PHOSPHATE SERPL-MCNC: 3.9 MG/DL (ref 2.4–5.1)
PLATELET # BLD AUTO: 439 10(3)UL (ref 150–450)
POTASSIUM SERPL-SCNC: 3.9 MMOL/L (ref 3.5–5.1)
PROT SERPL-MCNC: 6.8 G/DL (ref 5.7–8.2)
RBC # BLD AUTO: 3.35 X10(6)UL (ref 3.8–5.3)
SODIUM SERPL-SCNC: 141 MMOL/L (ref 136–145)
WBC # BLD AUTO: 9.3 X10(3) UL (ref 4–11)

## 2025-04-12 PROCEDURE — 99232 SBSQ HOSP IP/OBS MODERATE 35: CPT | Performed by: STUDENT IN AN ORGANIZED HEALTH CARE EDUCATION/TRAINING PROGRAM

## 2025-04-12 RX ORDER — HYDRALAZINE HYDROCHLORIDE 20 MG/ML
10 INJECTION INTRAMUSCULAR; INTRAVENOUS ONCE
Status: COMPLETED | OUTPATIENT
Start: 2025-04-12 | End: 2025-04-12

## 2025-04-12 RX ORDER — HYDRALAZINE HYDROCHLORIDE 50 MG/1
50 TABLET, FILM COATED ORAL 3 TIMES DAILY
Status: DISCONTINUED | OUTPATIENT
Start: 2025-04-12 | End: 2025-04-13

## 2025-04-12 NOTE — PLAN OF CARE
Patient alert and oriented x4. Up with assist/walker. NSR on tele. Elevated blood pressure this am. Scheduled am hydralazine given early, Cards aware. Maintaining o2 sats on RA. CPAP overnight, tolerated well. Voids, purewick in place. C/o moderate pain to LUE, prn norco given with relief. IV abx given. Reviewed POC with patient, verbalized understanding. Safety precautions put in place, bed alarm on.       Problem: Diabetes/Glucose Control  Goal: Glucose maintained within prescribed range  Description: INTERVENTIONS:- Monitor Blood Glucose as ordered- Assess for signs and symptoms of hyperglycemia and hypoglycemia- Administer ordered medications to maintain glucose within target range- Assess barriers to adequate nutritional intake and initiate nutrition consult as needed- Instruct patient on self management of diabetes  Outcome: Progressing     Problem: Patient/Family Goals  Goal: Patient/Family Long Term Goal  Description: Patient's Long Term Goal: To go home. Interventions:- PT/OT. Trend labs/vs. - See additional Care Plan goals for specific interventions  Outcome: Progressing  Goal: Patient/Family Short Term Goal  Description: Patient's Short Term Goal: Pain management. Interventions: - Take pain meds prn. Reposition. - See additional Care Plan goals for specific interventions  Outcome: Progressing     Problem: CARDIOVASCULAR - ADULT  Goal: Absence of cardiac arrhythmias or at baseline  Description: INTERVENTIONS:- Continuous cardiac monitoring, monitor vital signs, obtain 12 lead EKG if indicated- Evaluate effectiveness of antiarrhythmic and heart rate control medications as ordered- Initiate emergency measures for life threatening arrhythmias- Monitor electrolytes and administer replacement therapy as ordered  Outcome: Progressing

## 2025-04-12 NOTE — PROGRESS NOTES
[facility]   part of [facility]     Hospitalist Progress Note     Alisha Wilde Patient Status:  Inpatient    10/25/1965 MRN [id number]   Location [facility] 4SW-A Attending Danette Atwood, DO   Hosp Day # 13 PCP Bridger Avendano MD     Chief Complaint: Back pain, chest pain    Subjective:     Patient resting in bed and has no complaints.  BP remains elevated this morning     Objective:    Review of Systems:   A comprehensive review of systems was completed; pertinent positive and negatives stated in subjective.    Vital signs:  Temp:  [97.9 °F (36.6 °C)-98.6 °F (37 °C)] 98.4 °F (36.9 °C)  Pulse:  [] 93  Resp:  [17-20] 18  BP: (119-163)/(61-97) 163/82  SpO2:  [97 %-100 %] 99 %    Physical Exam:    General: No acute distress  Respiratory: No wheezes, no rhonchi  Cardiovascular: S1, S2, regular rate and rhythm  Abdomen: Soft, Non-tender, non-distended, positive bowel sounds  Neuro: No new focal deficits.   Extremities: No edema    Diagnostic Data:    Labs:  Recent Labs   Lab 25  2019 25  0441 25  0425 04/10/25  0514 25  0446 25  0742   WBC 17.1* 12.9* 10.0 11.8* 10.3 9.3   HGB 9.7* 9.1* 8.1* 8.8* 8.5* 8.5*   MCV 79.2* 79.7* 80.2 78.9* 81.1 80.3   .0 182.0 227.0 310.0 348.0 439.0   INR 1.19  --   --   --   --   --        Recent Labs   Lab 04/10/25  0514 25  0446 25  0742   * 106* 109*   BUN 7* 8* 8*   CREATSERUM 1.12* 1.13* 1.04*   CA 9.2 9.3 9.4   ALB 3.9 3.8 3.8    139 141   K 3.8  3.8 4.2  4.2 3.9    106 107   CO2 24.0 24.0 23.0   ALKPHO 61 58 60   AST 11 11 11   ALT <7* <7* <7*   BILT 0.4 0.4 0.4   TP 6.8 6.7 6.8       Estimated Glomerular Filtration Rate: 61 mL/min/1.73m2 (result from lab).    No results for input(s): \"TROP\", \"TROPHS\", \"CK\" in the last 168 hours.    Recent Labs   Lab 25  2019   PTP 15.2*   INR 1.19                  Microbiology    No results found for this visit on 25.      Imaging: Reviewed in  Epic.    Medications: Scheduled Medications[1]    Assessment & Plan:      #Sinus pause  -Patient had 4.4 second pause on telemetry overnight on 4/11  -Believed to be vagally mediated sinus pause postanesthesia and presumed during sleeping hours per cardiology  -Hold AV adilene agents  -Cardiology following  -Plan for MCT on discharge to evaluate rhythm    #Aortic arch dissection with aneurysm  #Left renal artery dissection  -S/p left carotid to subclavian transposition, endovascular arch repair, stenting of the left renal, left iliac  -Repeat CTA 4/5 with some dissection within the left subclavian artery as well as some persistent flap with suboptimal expansion of the stent graft  -S/p thoracic stent graft, balloon angioplasty and stent right external iliac artery 4/7  -Lifelong aspirin Plavix  -Vascular surgery following    #Hypertension  -Carvedilol discontinued  -Amlodipine, hydralazine  Plan to increase hydralazine dose-If blood pressure remains elevated this morning after receiving medications  -Cardiology following    #Prior hx of Type A aortic dissection  -S/p repair 11/2024  -Post op complications with rep failure and NAIMA needing trach and peg that are now removed     #Sepsis 2/2 aspiration pneumonia   #Acute hypoxic respiratory failure, resolved  #Moderate left pleural effusion with atelectasis/pneumonia  -IV antibiotics    #Pericardial fluid noted on CTA  -Monitor closely     #Ileus, resolved  -Bowel regimen     #LUE weakness   -Suspected 2/2 to dissection/surgery     #Hyperlipidemia     #CKD stage 3     #TANYA     #Obesity, BMI 36    Accepted at [facility] for acute rehab    Danette Atwood DO    Supplementary Documentation:     Quality:  DVT Mechanical Prophylaxis:   SCDs, Early ambuation  DVT Pharmacologic Prophylaxis   Medication    enoxaparin (Lovenox) 40 MG/0.4ML SUBQ injection 40 mg         DVT Pharmacologic prophylaxis: Aspirin 81 mg      Code Status: Full Code  Webb: External urinary catheter in  place  Webb Duration (in days):   Central line:    BRANDON:     Discharge is dependent on: Clinical improvement  At this point Ms. Wilde is expected to be discharge to: TBD    The 21st Century Cures Act makes medical notes like these available to patients in the interest of transparency. Please be advised this is a medical document. Medical documents are intended to carry relevant information, facts as evident, and the clinical opinion of the practitioner. The medical note is intended as peer to peer communication and may appear blunt or direct. It is written in medical language and may contain abbreviations or verbiage that are unfamiliar.                         [1]    aspirin  81 mg Oral Daily    amLODIPine  10 mg Oral Daily    hydrALAZINE  25 mg Oral TID    pantoprazole  40 mg Oral QAM AC    piperacillin-tazobactam  3.375 g Intravenous Q8H    melatonin  3 mg Oral Nightly    enoxaparin  40 mg Subcutaneous Daily    clopidogrel  75 mg Oral Daily    ipratropium-albuterol  3 mL Nebulization Q6H WA    multivitamin  1 tablet Oral Daily    docusate sodium  100 mg Oral BID    polyethylene glycol (PEG 3350)  17 g Oral Daily

## 2025-04-12 NOTE — PROGRESS NOTES
Progress Note  Alisha Wilde Patient Status:  Inpatient    10/25/1965 MRN [id number]   Location [facility] 8NE-A Attending Danette Atwood, DO   Hosp Day # 13 PCP Bridger Avendano MD     Subjective:  Pt denies any cardiac complaints    Objective:  BP (!) 163/82 (BP Location: Right arm)   Pulse 96   Temp 98.4 °F (36.9 °C) (Oral)   Resp 17   Wt 209 lb 9.6 oz (95.1 kg)   SpO2 99%   BMI 34.88 kg/m²     Telemetry: NSR, transient episode of Mobitz2       Intake/Output:    Intake/Output Summary (Last 24 hours) at 2025 0821  Last data filed at 2025 0805  Gross per 24 hour   Intake 120 ml   Output 1350 ml   Net -1230 ml       Last 3 Weights   25 0500 209 lb 9.6 oz (95.1 kg)   25 0545 208 lb 15.9 oz (94.8 kg)   04/10/25 0530 227 lb 11.2 oz (103.3 kg)   25 0030 221 lb 12.5 oz (100.6 kg)   25 0300 233 lb 4 oz (105.8 kg)   25 0500 236 lb 5.3 oz (107.2 kg)   25 0400 246 lb 14.6 oz (112 kg)   25 0300 250 lb (113.4 kg)   25 0400 227 lb 11.8 oz (103.3 kg)   25 1654 222 lb 3.6 oz (100.8 kg)   25 1508 222 lb 3.6 oz (100.8 kg)   25 0725 210 lb (95.3 kg)   25 1312 230 lb (104.3 kg)       Labs:  Recent Labs   Lab 04/10/25  0514 25  0446 25  0742   * 106* 109*   BUN 7* 8* 8*   CREATSERUM 1.12* 1.13* 1.04*   EGFRCR 56* 55* 61   CA 9.2 9.3 9.4    139 141   K 3.8  3.8 4.2  4.2 3.9    106 107   CO2 24.0 24.0 23.0     Recent Labs   Lab 04/10/25  0514 25  0446 25  0742   RBC 3.41* 3.33* 3.35*   HGB 8.8* 8.5* 8.5*   HCT 26.9* 27.0* 26.9*   MCV 78.9* 81.1 80.3   MCH 25.8* 25.5* 25.4*   MCHC 32.7 31.5 31.6   RDW 17.1 17.4 17.7   NEPRELIM 8.08* 7.09 6.92   WBC 11.8* 10.3 9.3   .0 348.0 439.0         No results for input(s): \"TROP\", \"TROPHS\", \"CK\" in the last 168 hours.  Lab Results   Component Value Date    INR 1.19 2025    INR 1.18 2025    INR 1.14 2025       Diagnostics:       Review of  Systems   Respiratory: Negative.     Cardiovascular: Negative.        Physical Exam:    Physical Exam  Vitals reviewed.   Constitutional:       General: She is not in acute distress.     Appearance: She is obese. She is not ill-appearing.   Cardiovascular:      Rate and Rhythm: Normal rate and regular rhythm.      Pulses: Normal pulses.      Heart sounds: Murmur heard.      No friction rub. No gallop.   Pulmonary:      Effort: Pulmonary effort is normal.      Breath sounds: Normal breath sounds. No stridor. No wheezing, rhonchi or rales.   Chest:      Chest wall: No tenderness.   Musculoskeletal:      Cervical back: Normal range of motion.      Right lower leg: Edema present.      Left lower leg: Edema present.   Skin:     General: Skin is warm and dry.   Neurological:      Mental Status: She is alert and oriented to person, place, and time.         Medications:  Scheduled Medications[1]  Medication Infusions[2]    Assessment/Plan:    Benign essential HTN    Dissection of aorta, unspecified portion of aorta (HCC)    Aneurysm of aortic arch  Vagally mediated sinus pause post-anesthesia and presumed during sleep hours  Sinus bradycardia   HTN   HLP  CKD  Hx of aortic dissection s/p repair  Endoleak repair during this admission    Plan:  - transient episode of Mobitz Type 2  noted on tele, pt asymptomatic. continue to hold AV adilene agents. Continue to monitor on tele  - Blood pressure elevated this am before medications, will recheck 1 hr after am meds, if needed will plan on increasing hydralazine   - Mct on discharge to evaluate the rhythm    Plan discussed with pt and RN   KATHERINE Cabrales  [facility]  4/12/2025  8:21 AM    Addendum  SBP in 150's after am medications. Will increase the hydralzine to 50mg Q8hrs       [1]    aspirin  81 mg Oral Daily    amLODIPine  10 mg Oral Daily    hydrALAZINE  25 mg Oral TID    pantoprazole  40 mg Oral QAM AC    piperacillin-tazobactam  3.375 g  Intravenous Q8H    melatonin  3 mg Oral Nightly    enoxaparin  40 mg Subcutaneous Daily    clopidogrel  75 mg Oral Daily    ipratropium-albuterol  3 mL Nebulization Q6H WA    multivitamin  1 tablet Oral Daily    docusate sodium  100 mg Oral BID    polyethylene glycol (PEG 3350)  17 g Oral Daily   [2]    nitroGLYCERIN in dextrose 5%

## 2025-04-12 NOTE — PLAN OF CARE
Patient is alert and oriented times four. Lungs clear on auscultation. Patient is sinus rhythm on monitor. She had an episode of second degree AV block this AM. She was asymptomatic. Nurse practitioner notified of the episode. Sh has no c/o pain.   Plan: possible discharge today if BP is improved           Monitor blood pressure

## 2025-04-12 NOTE — PLAN OF CARE
Problem: Diabetes/Glucose Control  Goal: Glucose maintained within prescribed range  Description: INTERVENTIONS:- Monitor Blood Glucose as ordered- Assess for signs and symptoms of hyperglycemia and hypoglycemia- Administer ordered medications to maintain glucose within target range- Assess barriers to adequate nutritional intake and initiate nutrition consult as needed- Instruct patient on self management of diabetes  Outcome: Progressing     Problem: Patient/Family Goals  Goal: Patient/Family Long Term Goal  Description: Patient's Long Term Goal: To go home. Interventions:- PT/OT. Trend labs/vs. - See additional Care Plan goals for specific interventions  Outcome: Progressing  Goal: Patient/Family Short Term Goal  Description: Patient's Short Term Goal: Pain management. Interventions: - Take pain meds prn. Reposition. - See additional Care Plan goals for specific interventions  Outcome: Progressing     Problem: CARDIOVASCULAR - ADULT  Goal: Absence of cardiac arrhythmias or at baseline  Description: INTERVENTIONS:- Continuous cardiac monitoring, monitor vital signs, obtain 12 lead EKG if indicated- Evaluate effectiveness of antiarrhythmic and heart rate control medications as ordered- Initiate emergency measures for life threatening arrhythmias- Monitor electrolytes and administer replacement therapy as ordered  Outcome: Progressing

## 2025-04-13 LAB
ALBUMIN SERPL-MCNC: 4.4 G/DL (ref 3.2–4.8)
ALBUMIN/GLOB SERPL: 1.3 {RATIO} (ref 1–2)
ALP LIVER SERPL-CCNC: 65 U/L (ref 50–130)
ALT SERPL-CCNC: 7 U/L (ref 10–49)
ANION GAP SERPL CALC-SCNC: 11 MMOL/L (ref 0–18)
AST SERPL-CCNC: 14 U/L (ref ?–34)
BASOPHILS # BLD AUTO: 0.03 X10(3) UL (ref 0–0.2)
BASOPHILS NFR BLD AUTO: 0.3 %
BILIRUB SERPL-MCNC: 0.4 MG/DL (ref 0.2–1.1)
BUN BLD-MCNC: 6 MG/DL (ref 9–23)
CALCIUM BLD-MCNC: 9.9 MG/DL (ref 8.7–10.6)
CHLORIDE SERPL-SCNC: 107 MMOL/L (ref 98–112)
CO2 SERPL-SCNC: 22 MMOL/L (ref 21–32)
CREAT BLD-MCNC: 1.18 MG/DL (ref 0.55–1.02)
EGFRCR SERPLBLD CKD-EPI 2021: 53 ML/MIN/1.73M2 (ref 60–?)
EOSINOPHIL # BLD AUTO: 0.19 X10(3) UL (ref 0–0.7)
EOSINOPHIL NFR BLD AUTO: 1.8 %
ERYTHROCYTE [DISTWIDTH] IN BLOOD BY AUTOMATED COUNT: 17.7 %
GLOBULIN PLAS-MCNC: 3.4 G/DL (ref 2–3.5)
GLUCOSE BLD-MCNC: 106 MG/DL (ref 70–99)
HCT VFR BLD AUTO: 28.6 % (ref 35–48)
HGB BLD-MCNC: 9.1 G/DL (ref 12–16)
IMM GRANULOCYTES # BLD AUTO: 0.11 X10(3) UL (ref 0–1)
IMM GRANULOCYTES NFR BLD: 1 %
LYMPHOCYTES # BLD AUTO: 1.72 X10(3) UL (ref 1–4)
LYMPHOCYTES NFR BLD AUTO: 16.3 %
MAGNESIUM SERPL-MCNC: 2.4 MG/DL (ref 1.6–2.6)
MCH RBC QN AUTO: 25.5 PG (ref 26–34)
MCHC RBC AUTO-ENTMCNC: 31.8 G/DL (ref 31–37)
MCV RBC AUTO: 80.1 FL (ref 80–100)
MONOCYTES # BLD AUTO: 1.2 X10(3) UL (ref 0.1–1)
MONOCYTES NFR BLD AUTO: 11.4 %
NEUTROPHILS # BLD AUTO: 7.31 X10 (3) UL (ref 1.5–7.7)
NEUTROPHILS # BLD AUTO: 7.31 X10(3) UL (ref 1.5–7.7)
NEUTROPHILS NFR BLD AUTO: 69.2 %
OSMOLALITY SERPL CALC.SUM OF ELEC: 288 MOSM/KG (ref 275–295)
PHOSPHATE SERPL-MCNC: 3.7 MG/DL (ref 2.4–5.1)
PLATELET # BLD AUTO: 553 10(3)UL (ref 150–450)
POTASSIUM SERPL-SCNC: 4 MMOL/L (ref 3.5–5.1)
PROT SERPL-MCNC: 7.8 G/DL (ref 5.7–8.2)
RBC # BLD AUTO: 3.57 X10(6)UL (ref 3.8–5.3)
SODIUM SERPL-SCNC: 140 MMOL/L (ref 136–145)
WBC # BLD AUTO: 10.6 X10(3) UL (ref 4–11)

## 2025-04-13 PROCEDURE — 99232 SBSQ HOSP IP/OBS MODERATE 35: CPT | Performed by: STUDENT IN AN ORGANIZED HEALTH CARE EDUCATION/TRAINING PROGRAM

## 2025-04-13 RX ORDER — FLUTICASONE PROPIONATE 50 MCG
1 SPRAY, SUSPENSION (ML) NASAL DAILY
Status: DISCONTINUED | OUTPATIENT
Start: 2025-04-13 | End: 2025-04-15

## 2025-04-13 RX ORDER — CLONIDINE 0.3 MG/24H
1 PATCH, EXTENDED RELEASE TRANSDERMAL WEEKLY
Status: DISCONTINUED | OUTPATIENT
Start: 2025-04-13 | End: 2025-04-13

## 2025-04-13 RX ORDER — HYDROCHLOROTHIAZIDE 12.5 MG/1
12.5 TABLET ORAL DAILY
Status: DISCONTINUED | OUTPATIENT
Start: 2025-04-13 | End: 2025-04-15

## 2025-04-13 RX ORDER — HYDRALAZINE HYDROCHLORIDE 25 MG/1
25 TABLET, FILM COATED ORAL ONCE
Status: COMPLETED | OUTPATIENT
Start: 2025-04-13 | End: 2025-04-13

## 2025-04-13 NOTE — PLAN OF CARE
STACEY Florez notified of blood pressure of 160/93. Also, notified that patient had been on clonidine 0.3 mg weekly and that she is not getting this now.     Orders received to give IV hydralazine times one now.

## 2025-04-13 NOTE — PLAN OF CARE
Denies c/o malaise or cardiac symptoms.  Intermittent arm pain this am.  Remains in [facility] with a holosystollic murmur noted in all areas of the heart auscultation.  HRs do elevate as high as 133 with ambulation in the hallway, yet she is handling ambulation well.  C/o post nasal drip with her seasonal allergies so I paged Dr. Atwood requesting flonase.  Pt. Instructed on how to properly give herself Flonase.  Lungs clear and diminished on RA.  Abdomen soft and non tender to touch with active bowel sounds in all four quadrants.  Resmi APN with [facility] is very responsive to bp concerns.  She is slowly adding home medications to her regimen.  She did have brief 3 second area of bradying to the 30s, but she came right back to sinus tachy.  Resmi APN aware.  Negative for edema.  Pt. Did state that \"hydralazine makes me lightheaded, not so bad that I will pass out.  It's just made me feel that way before\".  Instructed to take her positions slowly and call us to help her to the bathroom.  All needs met by staff.      Problem: Diabetes/Glucose Control  Goal: Glucose maintained within prescribed range  Description: INTERVENTIONS:- Monitor Blood Glucose as ordered- Assess for signs and symptoms of hyperglycemia and hypoglycemia- Administer ordered medications to maintain glucose within target range- Assess barriers to adequate nutritional intake and initiate nutrition consult as needed- Instruct patient on self management of diabetes  Outcome: Progressing     Problem: Patient/Family Goals  Goal: Patient/Family Long Term Goal  Description: Patient's Long Term Goal: To go home. Interventions:- PT/OT. Trend labs/vs. - See additional Care Plan goals for specific interventions  Outcome: Progressing  Goal: Patient/Family Short Term Goal  Description: Patient's Short Term Goal: Pain management. Interventions: - Take pain meds prn. Reposition. - See additional Care Plan goals for specific interventions  Outcome: Progressing     Problem:  CARDIOVASCULAR - ADULT  Goal: Absence of cardiac arrhythmias or at baseline  Description: INTERVENTIONS:- Continuous cardiac monitoring, monitor vital signs, obtain 12 lead EKG if indicated- Evaluate effectiveness of antiarrhythmic and heart rate control medications as ordered- Initiate emergency measures for life threatening arrhythmias- Monitor electrolytes and administer replacement therapy as ordered  Outcome: Not Progressing

## 2025-04-13 NOTE — PROGRESS NOTES
Progress Note  Alisha Wilde Patient Status:  Inpatient    10/25/1965 MRN [id number]   Location [facility] 8NE-A Attending Danette Atwood, DO   Hosp Day # 14 PCP Bridger Avendano MD     Subjective:  Pt denies any chest pain or SOB.     Objective:  /82 (BP Location: Left arm)   Pulse 93   Temp 98.4 °F (36.9 °C) (Oral)   Resp 20   Wt 216 lb 11.4 oz (98.3 kg)   SpO2 98%   BMI 36.06 kg/m²     Telemetry: NSR, HR 98      Intake/Output:    Intake/Output Summary (Last 24 hours) at 2025 0926  Last data filed at 2025 0500  Gross per 24 hour   Intake 400 ml   Output 1700 ml   Net -1300 ml       Last 3 Weights   25 0622 216 lb 11.4 oz (98.3 kg)   25 0500 218 lb 4.8 oz (99 kg)   25 0500 209 lb 9.6 oz (95.1 kg)   25 0545 208 lb 15.9 oz (94.8 kg)   04/10/25 0530 227 lb 11.2 oz (103.3 kg)   25 0030 221 lb 12.5 oz (100.6 kg)   25 0300 233 lb 4 oz (105.8 kg)   25 0500 236 lb 5.3 oz (107.2 kg)   25 0400 246 lb 14.6 oz (112 kg)   25 0300 250 lb (113.4 kg)   25 0400 227 lb 11.8 oz (103.3 kg)   25 1654 222 lb 3.6 oz (100.8 kg)   25 1508 222 lb 3.6 oz (100.8 kg)   25 0725 210 lb (95.3 kg)   25 1312 230 lb (104.3 kg)       Labs:  Recent Labs   Lab 25  0446 25  0742 25  0501   * 109* 106*   BUN 8* 8* 6*   CREATSERUM 1.13* 1.04* 1.18*   EGFRCR 55* 61 53*   CA 9.3 9.4 9.9    141 140   K 4.2  4.2 3.9 4.0    107 107   CO2 24.0 23.0 22.0     Recent Labs   Lab 25  0446 25  0742 25  0501   RBC 3.33* 3.35* 3.57*   HGB 8.5* 8.5* 9.1*   HCT 27.0* 26.9* 28.6*   MCV 81.1 80.3 80.1   MCH 25.5* 25.4* 25.5*   MCHC 31.5 31.6 31.8   RDW 17.4 17.7 17.7   NEPRELIM 7.09 6.92 7.31   WBC 10.3 9.3 10.6   .0 439.0 553.0*         No results for input(s): \"TROP\", \"TROPHS\", \"CK\" in the last 168 hours.  Lab Results   Component Value Date    INR 1.19 2025    INR 1.18 2025    INR 1.14  04/01/2025       Diagnostics:   Echo 4/2025  Conclusions:     1. Left ventricle: The cavity size was normal. Wall thickness was at the      upper limits of normal. Systolic function was normal. The estimated      ejection fraction was 60-65%, by visual assessment. Left ventricular      diastolic function parameters were normal for the patient's age.   2. Left atrium: The left atrial volume was mildly increased.   3. Pulmonary arteries: Systolic pressure was at the upper limits of normal      to, at most, mildly increased; in the range of 30 mm Hg to 35 mm Hg.   Impressions:  This study is compared with previous dated 11/23/2024: No   significant change since prior study.   *       Review of Systems   Respiratory: Negative.     Cardiovascular: Negative.        Physical Exam:    Physical Exam  Vitals reviewed.   Constitutional:       General: She is not in acute distress.     Appearance: She is obese. She is not ill-appearing.   Cardiovascular:      Rate and Rhythm: Normal rate and regular rhythm.      Pulses: Normal pulses.      Heart sounds: No murmur heard.     No friction rub. No gallop.   Pulmonary:      Effort: Pulmonary effort is normal.      Breath sounds: Normal breath sounds. No stridor. No wheezing, rhonchi or rales.   Chest:      Chest wall: No tenderness.   Musculoskeletal:      Cervical back: Normal range of motion.      Right lower leg: Edema present.      Left lower leg: Edema present.   Skin:     General: Skin is warm and dry.   Neurological:      Mental Status: She is alert and oriented to person, place, and time.         Medications:  Scheduled Medications[1]  Medication Infusions[2]    Assessment/Plan:    Benign essential HTN    Dissection of aorta, unspecified portion of aorta (HCC)    Aneurysm of aortic arch  Vagally mediated sinus pause post-anesthesia and presumed during sleep hours  Sinus bradycardia - transient episode of Mobitz type 2 on monitor. Pt asymptomatic. AV adilene agents on hold  HTN    HLP  CKD  Hx of aortic dissection s/p repair  Endoleak repair during this admission- s/p thoracic stent graft with balloon angioplasty and stent of right external iliac artery - POD 6    Plan;  - blood pressure elevated, will add hydrochlorothiazide   - continue amlodipine, aspirin, Plavix, hydralazine  -will need MCT on discharge   Plan discussed with pt and RN     KATHERINE Cabrales  [facility] [place]  4/13/2025  9:26 AM    Addendum:  Pt with elevated BP with SBP in160's after am meds. Will give additional dose of 25mg hydralzine now. And increase the daily hydralazine to 75mg TID       Addendum:  Pt with reflex tachycardia from stopping clonidine. Will restart clonidine today    Addendum  - pt with HR dipping down to 30's. Few times today. Will hold off starting clonidine. Will have EP see pt tomorrow.          [1]    hydrochlorothiazide  12.5 mg Oral Daily    hydrALAZINE  50 mg Oral TID    aspirin  81 mg Oral Daily    amLODIPine  10 mg Oral Daily    pantoprazole  40 mg Oral QAM AC    melatonin  3 mg Oral Nightly    enoxaparin  40 mg Subcutaneous Daily    clopidogrel  75 mg Oral Daily    ipratropium-albuterol  3 mL Nebulization Q6H WA    multivitamin  1 tablet Oral Daily    docusate sodium  100 mg Oral BID    polyethylene glycol (PEG 3350)  17 g Oral Daily   [2]    nitroGLYCERIN in dextrose 5%

## 2025-04-13 NOTE — PROGRESS NOTES
[facility]   [facility]     Hospitalist Progress Note     Alisha Wilde Patient Status:  Inpatient    10/25/1965 MRN [id number]   Location [facility] 4SW-A Attending Danette Atwood, DO   Hosp Day # 14 PCP Bridger Avendano MD     Chief Complaint: Back pain, chest pain    Subjective:     Patient resting in chair and feels well    Objective:    Review of Systems:   A comprehensive review of systems was completed; pertinent positive and negatives stated in subjective.    Vital signs:  Temp:  [98.4 °F (36.9 °C)-98.9 °F (37.2 °C)] 98.4 °F (36.9 °C)  Pulse:  [] 120  Resp:  [17-20] 20  BP: (142-164)/(79-93) 164/79  SpO2:  [97 %-100 %] 100 %    Physical Exam:    General: No acute distress  Respiratory: No wheezes, no rhonchi  Cardiovascular: S1, S2, regular rate and rhythm  Abdomen: Soft, Non-tender, non-distended, positive bowel sounds  Neuro: No new focal deficits.   Extremities: No edema    Diagnostic Data:    Labs:  Recent Labs   Lab 25  0425 04/10/25  0514 25  0742 25  0501   WBC 17.1*   < > 10.0 11.8* 10.3 9.3 10.6   HGB 9.7*   < > 8.1* 8.8* 8.5* 8.5* 9.1*   MCV 79.2*   < > 80.2 78.9* 81.1 80.3 80.1   .0   < > 227.0 310.0 348.0 439.0 553.0*   INR 1.19  --   --   --   --   --   --     < > = values in this interval not displayed.       Recent Labs   Lab 25  0742 25  0501   * 109* 106*   BUN 8* 8* 6*   CREATSERUM 1.13* 1.04* 1.18*   CA 9.3 9.4 9.9   ALB 3.8 3.8 4.4    141 140   K 4.2  4.2 3.9 4.0    107 107   CO2 24.0 23.0 22.0   ALKPHO 58 60 65   AST 11 11 14   ALT <7* <7* 7*   BILT 0.4 0.4 0.4   TP 6.7 6.8 7.8       Estimated Glomerular Filtration Rate: 53 mL/min/1.73m2 (A) (result from lab).    No results for input(s): \"TROP\", \"TROPHS\", \"CK\" in the last 168 hours.    Recent Labs   Lab 25   PTP 15.2*   INR 1.19                  Microbiology    No results found for  this visit on 03/30/25.      Imaging: Reviewed in Epic.    Medications: Scheduled Medications[1]    Assessment & Plan:      #Sinus pause  -Patient had 4.4 second pause on telemetry overnight on 4/11  -Believed to be vagally mediated sinus pause postanesthesia and presumed during sleeping hours per cardiology  -Hold AV adilene agents  -Cardiology following  -Plan for MCT on discharge to evaluate rhythm    #Aortic arch dissection with aneurysm  #Left renal artery dissection  -S/p left carotid to subclavian transposition, endovascular arch repair, stenting of the left renal, left iliac  -Repeat CTA 4/5 with some dissection within the left subclavian artery as well as some persistent flap with suboptimal expansion of the stent graft  -S/p thoracic stent graft, balloon angioplasty and stent right external iliac artery 4/7  -Lifelong Aspirin, Plavix  -Vascular surgery following    #Hypertension  -Carvedilol discontinued  -Amlodipine, hydralazine  -Started on hydrochlorothiazide today by cardiology  -Hydralazine dose increased today by cardiology  -Cardiology following    #Prior hx of Type A aortic dissection  -S/p repair 11/2024  -Post op complications with rep failure and NAIMA needing trach and peg that are now removed     #Sepsis 2/2 aspiration pneumonia   #Acute hypoxic respiratory failure, resolved  #Moderate left pleural effusion with atelectasis/pneumonia  -IV antibiotics    #Pericardial fluid noted on CTA  -Monitor closely     #Ileus, resolved  -Bowel regimen     #LUE weakness   -Suspected 2/2 to dissection/surgery     #Hyperlipidemia     #CKD stage 3     #TANYA     #Obesity, BMI 36    Accepted at [facility] for acute rehab    Danette Atwood DO    Supplementary Documentation:     Quality:  DVT Mechanical Prophylaxis:   SCDs, Early ambuation  DVT Pharmacologic Prophylaxis   Medication    enoxaparin (Lovenox) 40 MG/0.4ML SUBQ injection 40 mg         DVT Pharmacologic prophylaxis: Aspirin 81 mg      Code Status: Full  Code  Webb: External urinary catheter in place  Webb Duration (in days):   Central line:    BRANDON: 4/14/2025    Discharge is dependent on: Clinical improvement  At this point Ms. Wilde is expected to be discharge to: TBD    The 21st Century Cures Act makes medical notes like these available to patients in the interest of transparency. Please be advised this is a medical document. Medical documents are intended to carry relevant information, facts as evident, and the clinical opinion of the practitioner. The medical note is intended as peer to peer communication and may appear blunt or direct. It is written in medical language and may contain abbreviations or verbiage that are unfamiliar.                         [1]    hydrochlorothiazide  12.5 mg Oral Daily    hydrALAZINE  75 mg Oral TID    cloNIDine  1 patch Transdermal Weekly    aspirin  81 mg Oral Daily    amLODIPine  10 mg Oral Daily    pantoprazole  40 mg Oral QAM AC    melatonin  3 mg Oral Nightly    enoxaparin  40 mg Subcutaneous Daily    clopidogrel  75 mg Oral Daily    ipratropium-albuterol  3 mL Nebulization Q6H WA    multivitamin  1 tablet Oral Daily    docusate sodium  100 mg Oral BID    polyethylene glycol (PEG 3350)  17 g Oral Daily

## 2025-04-13 NOTE — PROGRESS NOTES
Alisha Wilde  [id number]  04/13/25    Patient seen and examined.  Neurovascularly intact, left radial pulse present.  Creatinine is stable with good urine output.  Remains mildly tachycardic with persistent hypertension.  Appreciate cardiology assistance, hydralazine increased today.  Continue aspirin Plavix. Out of bed/ambulate. Stable for discharge from surgical standpoint once BP is stabilized.      Maria Dolores Vazquez MD  Vascular Surgery  [facility]

## 2025-04-13 NOTE — PROGRESS NOTES
Notified by RN pt hypertensive with SBP in the 160s. Pt concerned about not having her weekly Clonidine patch. Advised RN will defer restarting clonidine patch for now. Hydralazine 10 mg IVP ordered.

## 2025-04-13 NOTE — PLAN OF CARE
Pt is A&O x4. L arm pain managed w/ prn meds. Denies SOB & cardiac symptoms.  Maintaining O2 on RA. Cpap while sleeping. NS/ST on tele, S1&S2 present.  Bowel sounds active. Continent of bowel and bladder.  Pt updated on plan of care. BP control. Bed in lowest position. Bed alarm on. Call light within reach.     Problem: Diabetes/Glucose Control  Goal: Glucose maintained within prescribed range  Description: INTERVENTIONS:- Monitor Blood Glucose as ordered- Assess for signs and symptoms of hyperglycemia and hypoglycemia- Administer ordered medications to maintain glucose within target range- Assess barriers to adequate nutritional intake and initiate nutrition consult as needed- Instruct patient on self management of diabetes  Outcome: Progressing     Problem: CARDIOVASCULAR - ADULT  Goal: Absence of cardiac arrhythmias or at baseline  Description: INTERVENTIONS:- Continuous cardiac monitoring, monitor vital signs, obtain 12 lead EKG if indicated- Evaluate effectiveness of antiarrhythmic and heart rate control medications as ordered- Initiate emergency measures for life threatening arrhythmias- Monitor electrolytes and administer replacement therapy as ordered  Outcome: Progressing     Problem: Patient/Family Goals  Goal: Patient/Family Long Term Goal  Description: Patient's Long Term Goal: To go home. Interventions:- PT/OT. Trend labs/vs. - See additional Care Plan goals for specific interventions  Outcome: Progressing  Goal: Patient/Family Short Term Goal  Description: Patient's Short Term Goal: Pain management. Interventions: - Take pain meds prn. Reposition. - See additional Care Plan goals for specific interventions  Outcome: Progressing

## 2025-04-14 PROBLEM — I49.5 TACHYCARDIA-BRADYCARDIA (HCC): Status: ACTIVE | Noted: 2025-04-14

## 2025-04-14 LAB
ALBUMIN SERPL-MCNC: 4.1 G/DL (ref 3.2–4.8)
ALBUMIN/GLOB SERPL: 1.3 {RATIO} (ref 1–2)
ALP LIVER SERPL-CCNC: 58 U/L (ref 50–130)
ALT SERPL-CCNC: 8 U/L (ref 10–49)
ANION GAP SERPL CALC-SCNC: 10 MMOL/L (ref 0–18)
AST SERPL-CCNC: 13 U/L (ref ?–34)
BASOPHILS # BLD AUTO: 0.05 X10(3) UL (ref 0–0.2)
BASOPHILS NFR BLD AUTO: 0.5 %
BILIRUB SERPL-MCNC: 0.4 MG/DL (ref 0.2–1.1)
BUN BLD-MCNC: 7 MG/DL (ref 9–23)
CALCIUM BLD-MCNC: 9.8 MG/DL (ref 8.7–10.6)
CHLORIDE SERPL-SCNC: 106 MMOL/L (ref 98–112)
CO2 SERPL-SCNC: 23 MMOL/L (ref 21–32)
CREAT BLD-MCNC: 1.09 MG/DL (ref 0.55–1.02)
EGFRCR SERPLBLD CKD-EPI 2021: 58 ML/MIN/1.73M2 (ref 60–?)
EOSINOPHIL # BLD AUTO: 0.18 X10(3) UL (ref 0–0.7)
EOSINOPHIL NFR BLD AUTO: 1.9 %
ERYTHROCYTE [DISTWIDTH] IN BLOOD BY AUTOMATED COUNT: 17.6 %
GLOBULIN PLAS-MCNC: 3.2 G/DL (ref 2–3.5)
GLUCOSE BLD-MCNC: 113 MG/DL (ref 70–99)
HCT VFR BLD AUTO: 27.5 % (ref 35–48)
HGB BLD-MCNC: 8.9 G/DL (ref 12–16)
IMM GRANULOCYTES # BLD AUTO: 0.15 X10(3) UL (ref 0–1)
IMM GRANULOCYTES NFR BLD: 1.6 %
LYMPHOCYTES # BLD AUTO: 1.18 X10(3) UL (ref 1–4)
LYMPHOCYTES NFR BLD AUTO: 12.3 %
MAGNESIUM SERPL-MCNC: 2.2 MG/DL (ref 1.6–2.6)
MCH RBC QN AUTO: 25.6 PG (ref 26–34)
MCHC RBC AUTO-ENTMCNC: 32.4 G/DL (ref 31–37)
MCV RBC AUTO: 79.3 FL (ref 80–100)
MONOCYTES # BLD AUTO: 0.99 X10(3) UL (ref 0.1–1)
MONOCYTES NFR BLD AUTO: 10.4 %
NEUTROPHILS # BLD AUTO: 7.01 X10 (3) UL (ref 1.5–7.7)
NEUTROPHILS # BLD AUTO: 7.01 X10(3) UL (ref 1.5–7.7)
NEUTROPHILS NFR BLD AUTO: 73.3 %
OSMOLALITY SERPL CALC.SUM OF ELEC: 287 MOSM/KG (ref 275–295)
PHOSPHATE SERPL-MCNC: 4.1 MG/DL (ref 2.4–5.1)
PLATELET # BLD AUTO: 539 10(3)UL (ref 150–450)
POTASSIUM SERPL-SCNC: 3.9 MMOL/L (ref 3.5–5.1)
PROT SERPL-MCNC: 7.3 G/DL (ref 5.7–8.2)
RBC # BLD AUTO: 3.47 X10(6)UL (ref 3.8–5.3)
SODIUM SERPL-SCNC: 139 MMOL/L (ref 136–145)
WBC # BLD AUTO: 9.6 X10(3) UL (ref 4–11)

## 2025-04-14 PROCEDURE — 99232 SBSQ HOSP IP/OBS MODERATE 35: CPT | Performed by: STUDENT IN AN ORGANIZED HEALTH CARE EDUCATION/TRAINING PROGRAM

## 2025-04-14 RX ORDER — HYDRALAZINE HYDROCHLORIDE 100 MG/1
100 TABLET, FILM COATED ORAL 3 TIMES DAILY
Qty: 90 TABLET | Refills: 2 | Status: SHIPPED | OUTPATIENT
Start: 2025-04-14 | End: 2025-04-15

## 2025-04-14 RX ORDER — HYDROCHLOROTHIAZIDE 12.5 MG/1
12.5 TABLET ORAL DAILY
Qty: 30 TABLET | Refills: 2 | Status: SHIPPED | OUTPATIENT
Start: 2025-04-15 | End: 2025-04-15

## 2025-04-14 RX ORDER — HYDRALAZINE HYDROCHLORIDE 25 MG/1
25 TABLET, FILM COATED ORAL ONCE
Status: DISCONTINUED | OUTPATIENT
Start: 2025-04-14 | End: 2025-04-15

## 2025-04-14 RX ORDER — HYDRALAZINE HYDROCHLORIDE 50 MG/1
100 TABLET, FILM COATED ORAL 3 TIMES DAILY
Status: DISCONTINUED | OUTPATIENT
Start: 2025-04-14 | End: 2025-04-15

## 2025-04-14 NOTE — PROGRESS NOTES
Progress Note  Alisha Wilde Patient Status:  Inpatient    10/25/1965 MRN [id number]   Location [facility] 8NE-A Attending Danette Atwood, DO   Hosp Day # 15 PCP Bridger Avendano MD     Subjective:  Denies palpitations    Objective:  BP (!) 169/80 (BP Location: Right arm)   Pulse 93   Temp 98.4 °F (36.9 °C) (Oral)   Resp 20   Wt 215 lb 8 oz (97.8 kg)   SpO2 95%   BMI 35.86 kg/m²     Telemetry: ST, SB, occ mobitz II brief      Intake/Output: -4794    Intake/Output Summary (Last 24 hours) at 2025 1020  Last data filed at 2025 0558  Gross per 24 hour   Intake 960 ml   Output 575 ml   Net 385 ml       Last 3 Weights   25 0611 215 lb 8 oz (97.8 kg)   25 0622 216 lb 11.4 oz (98.3 kg)   25 0500 218 lb 4.8 oz (99 kg)   25 0500 209 lb 9.6 oz (95.1 kg)   25 0545 208 lb 15.9 oz (94.8 kg)   04/10/25 0530 227 lb 11.2 oz (103.3 kg)   25 0030 221 lb 12.5 oz (100.6 kg)   25 0300 233 lb 4 oz (105.8 kg)   25 0500 236 lb 5.3 oz (107.2 kg)   25 0400 246 lb 14.6 oz (112 kg)   25 0300 250 lb (113.4 kg)   25 0400 227 lb 11.8 oz (103.3 kg)   25 1654 222 lb 3.6 oz (100.8 kg)   25 1508 222 lb 3.6 oz (100.8 kg)   25 0725 210 lb (95.3 kg)   25 1312 230 lb (104.3 kg)       Labs:  Recent Labs   Lab 25  0742 25  0501 25  0528   * 106* 113*   BUN 8* 6* 7*   CREATSERUM 1.04* 1.18* 1.09*   EGFRCR 61 53* 58*   CA 9.4 9.9 9.8   ALB 3.8 4.4 4.1    140 139   K 3.9 4.0 3.9    107 106   CO2 23.0 22.0 23.0   ALKPHO 60 65 58   AST 11 14 13   ALT <7* 7* 8*   BILT 0.4 0.4 0.4   TP 6.8 7.8 7.3     Recent Labs   Lab 25  0742 25  0501 25  0528   RBC 3.35* 3.57* 3.47*   HGB 8.5* 9.1* 8.9*   HCT 26.9* 28.6* 27.5*   MCV 80.3 80.1 79.3*   MCH 25.4* 25.5* 25.6*   MCHC 31.6 31.8 32.4   RDW 17.7 17.7 17.6   NEPRELIM 6.92 7.31 7.01   WBC 9.3 10.6 9.6   .0 553.0* 539.0*         No results for input(s):  \"TROP\", \"TROPHS\", \"CK\" in the last 168 hours.  Lab Results   Component Value Date    INR 1.19 04/07/2025    INR 1.18 04/03/2025    INR 1.14 04/01/2025       Diagnostics:     Review of Systems   Constitutional: Negative.   Cardiovascular: Negative.    Respiratory: Negative.         Physical Exam:    Gen: Alert, oriented x 3, in no apparent distress  Heent: Pupils equal, reactive. Mucous membranes moist.   Neck: left neck dressing dry  Cardiac: Regular rate and rhythm, normal S1,S2  Lungs: Clear to auscultation  Abd: Soft, non tender, non distended  Ext: No edema  Skin: Warm, dry  Neuro: No focal deficits        Medications:    Scheduled Medications[1]  Medication Infusions[2]        Assessment:  Tachy/Bradycardia with pauses (up to 4.4 seconds )   AV adilene blocking agents have remained on hold  , asymptomatic, Mobitz 1  /80  On amlodipine 10 mg po, Hydralazine 75 mg po TID  S/p ascending aortic aneurysm repair last year, w/p endoleak repair of thoracic stent graft 4/11/25 by vascular surgery  On ASA, plavix  Previous underwent left carotid to subclavian transposition, endovascular arch repair, stenting of the left renal and iliac arteries 4/2 Dr. Holder  Hyperlipidemia  ETOH abuse  Obesity   CKD    Plan:  EP to review today  Continue norvasc, increase hydralazine to 100 mg po TID  Continue to hold AV adilene blocking agents    Plan of care discussed with patient, RN.    STACEY Theodore  4/14/2025  10:20 AM  ____________________________  Pt seen and examined and discussed with the APN.    Tele: Reviewed yoon . Sinus slowing and IL prolongation and Type 1 second degree AVB. No Type 2    Exam  General- awake alert  HEENT- PEERLA  Neck- JVP normal  CV- RRR  Lungs- CTA  Extremities- no edema  Neuro- Normal  Psych- mood/affect congruent  Skin- no lesions    I have personally performed the medical decision making in its entirety. My additions include:  Plan  - Ok to use Clonidine. Her yoon was at night and has  all the features of Vagally mediated AV block.    R3    ____________________________  Lizz Sanders MD, FACC, [facility]  Cardiac Electrophysiololgy  [facility]  4/14/2025             [1]    hydrochlorothiazide  12.5 mg Oral Daily    hydrALAZINE  75 mg Oral TID    fluticasone propionate  1 spray Each Nare Daily    aspirin  81 mg Oral Daily    amLODIPine  10 mg Oral Daily    pantoprazole  40 mg Oral QAM AC    melatonin  3 mg Oral Nightly    enoxaparin  40 mg Subcutaneous Daily    clopidogrel  75 mg Oral Daily    ipratropium-albuterol  3 mL Nebulization Q6H WA    multivitamin  1 tablet Oral Daily    docusate sodium  100 mg Oral BID    polyethylene glycol (PEG 3350)  17 g Oral Daily   [2]    nitroGLYCERIN in dextrose 5%

## 2025-04-14 NOTE — PROGRESS NOTES
[facility]   part of [facility]     Vascular Surgery Progress Note    Alisha Wilde Patient Status:  Inpatient    10/25/1965 MRN [id number]   Location [facility] 8NE-A Attending Danette Atwood, DO   Hosp Day # 15 PCP Bridger Avendano MD     Objective:   Temp: 97.9 °F (36.6 °C)  Pulse: 101  Resp: 16  BP: 156/75    Intake/Output:     Intake/Output Summary (Last 24 hours) at 2025 0741  Last data filed at 2025 0558  Gross per 24 hour   Intake 960 ml   Output 575 ml   Net 385 ml     Events Yesterday/Overnight:    No acute events- SBP maintaining 150's overnight- afebrile. WBC 9.6. HGB 8.9    Exam:    Patient seen and examined- currently in bed. Left neck dressing taken down- incision site is soft/flat no drainage. Bilateral groins are soft, no hematoma/drainage present. Neurologically intact. Palpable bilateral DP and Pt pulses.     Impression/Plan:    58 yo female s/p thoracic stent graft with balloon angioplasty and stent of the right external iliac artery with Dr. Holder. She previously underwent a left carotid to subclavian transposition, endovascular arch repair, stenting of the left renal and iliac arteries with Dr. Holder on .     -Continue asa/plavix  -Need to be up- out of bed and ambulating  -Okay to shower today- discussed with RN   -Cardiology managing antihypertensives  -Plan for Lexi Cohen once BP is stabilized      Medications:  Current Hospital Medications[1]    Laboratory/Data:    LABS:  Recent Labs   Lab 25  0441 04/10/25  0514 25  0446 25  0742 25  0501 25  0528   WBC 17.1*   < > 11.8* 10.3 9.3 10.6 9.6   HGB 9.7*   < > 8.8* 8.5* 8.5* 9.1* 8.9*   MCV 79.2*   < > 78.9* 81.1 80.3 80.1 79.3*   .0   < > 310.0 348.0 439.0 553.0* 539.0*   INR 1.19  --   --   --   --   --   --     < > = values in this interval not displayed.       Recent Labs   Lab 04/10/25  0514 25  0446 25  0742 25  0501 25  0528     139 141 140 139   K 3.8  3.8 4.2  4.2 3.9 4.0 3.9    106 107 107 106   CO2 24.0 24.0 23.0 22.0 23.0   BUN 7* 8* 8* 6* 7*   CREATSERUM 1.12* 1.13* 1.04* 1.18* 1.09*   * 106* 109* 106* 113*   CA 9.2 9.3 9.4 9.9 9.8   MG 2.2 2.2 2.2 2.4 2.2   PHOS 3.0  3.0 4.0 3.9 3.7 4.1     Recent Labs   Lab 04/07/25  1314 04/07/25  2019   PTP  --  15.2*   INR  --  1.19   PTT 45.8* 30.0     Recent Labs   Lab 04/10/25  0514 04/11/25  0446 04/12/25  0742 04/13/25  0501 04/14/25  0528   ALT <7* <7* <7* 7* 8*   AST 11 11 11 14 13   ALB 3.9 3.8 3.8 4.4 4.1     No results for input(s): \"TROP\" in the last 168 hours.  Lab Results   Component Value Date    ANASCRN Negative 06/05/2021     No results for input(s): \"PCACT\", \"PSACT\", \"AT3ACT\", \"HIPAB\", \"PATHI\", \"STALA\", \"DRVVTRATIO\", \"DRVVT\", \"STACLOT\", \"CARIGG\", \"N4VS3EEDOA\", \"I1IG6EJBCA\", \"RA\", \"HAVIGM\", \"HBCIGM\", \"HCVAB\", \"HBSAG\", \"HBCAB\", \"HBVDNAINTERP\", \"ANAS\", \"C3\", \"C4\" in the last 168 hours.    Patsy RAVI, KATHERINE  4/14/2025  7:41 AM       [1]   Current Facility-Administered Medications   Medication Dose Route Frequency    hydroCHLOROthiazide tab 12.5 mg  12.5 mg Oral Daily    hydrALAZINE (Apresoline) tab 75 mg  75 mg Oral TID    fluticasone propionate (Flonase) 50 MCG/ACT nasal suspension 1 spray  1 spray Each Nare Daily    aspirin DR tab 81 mg  81 mg Oral Daily    amLODIPine (Norvasc) tab 10 mg  10 mg Oral Daily    pantoprazole (Protonix) DR tab 40 mg  40 mg Oral QAM AC    melatonin tab 3 mg  3 mg Oral Nightly    iodixanol (VISIPAQUE) 320 MG/ML injection 100 mL  100 mL Injection ONCE PRN    acetaminophen (Tylenol) tab 650 mg  650 mg Oral Q4H PRN    Or    HYDROcodone-acetaminophen (Norco) 5-325 MG per tab 1 tablet  1 tablet Oral Q4H PRN    Or    HYDROcodone-acetaminophen (Norco) 5-325 MG per tab 2 tablet  2 tablet Oral Q4H PRN    ondansetron (Zofran) 4 MG/2ML injection 4 mg  4 mg Intravenous Q6H PRN    prochlorperazine (Compazine) 10 MG/2ML injection 5 mg  5 mg Intravenous Q8H  PRN    enoxaparin (Lovenox) 40 MG/0.4ML SUBQ injection 40 mg  40 mg Subcutaneous Daily    metoprolol (Lopressor) 5 mg/5mL injection 2.5 mg  2.5 mg Intravenous Q20 Min PRN    nitroGLYCERIN in dextrose 5% 50 mg/250mL infusion premix  5-400 mcg/min Intravenous Continuous PRN    clopidogrel (Plavix) tab 75 mg  75 mg Oral Daily    ipratropium-albuterol (Duoneb) 0.5-2.5 (3) MG/3ML inhalation solution 3 mL  3 mL Nebulization Q6H WA    bisacodyl (Dulcolax) 10 MG rectal suppository 10 mg  10 mg Rectal Daily PRN    multivitamin (Tab-A-Treasure/Beta Carotene) tab 1 tablet  1 tablet Oral Daily    simethicone (Mylicon) chewable tab 160 mg  160 mg Oral QID PRN    docusate sodium (Colace) cap 100 mg  100 mg Oral BID    polyethylene glycol (PEG 3350) (Miralax) 17 g oral packet 17 g  17 g Oral Daily

## 2025-04-14 NOTE — PLAN OF CARE
Assumed care at 0730. Pt alert, oriented x4. Oxygen saturation adequate on room air. Lung sounds diminished bilaterally. Tele: NSR. Continent. Denies pain at this time. Reported intermittent, mild, chest pressure overnight. Cardiology notified. Ambulates standby. Plan of care: EP to see, [facility] to [facility], possible outpatient heart monitor. Pt and family updated on plan of care. Questions answered.     1245 - . Cardiology notified, increased hydralazine dose. Recheck after dose was .   1705 - . Cardiology notified. Patient okay to discharge from cardiology standpoint if cleared by all other services.       Problem: Diabetes/Glucose Control  Goal: Glucose maintained within prescribed range  Description: INTERVENTIONS:- Monitor Blood Glucose as ordered- Assess for signs and symptoms of hyperglycemia and hypoglycemia- Administer ordered medications to maintain glucose within target range- Assess barriers to adequate nutritional intake and initiate nutrition consult as needed- Instruct patient on self management of diabetes  4/14/2025 1105 by Zakiya Malone RN  Outcome: Progressing  4/14/2025 1105 by Zakiya Malone RN  Outcome: Progressing     Problem: Patient/Family Goals  Goal: Patient/Family Long Term Goal  Description: Patient's Long Term Goal: To go home. Interventions:- PT/OT. Trend labs/vs. - See additional Care Plan goals for specific interventions  4/14/2025 1105 by Zakiya Malone, RN  Outcome: Progressing  4/14/2025 1105 by Zakiya Malone, RN  Outcome: Progressing  Goal: Patient/Family Short Term Goal  Description: Patient's Short Term Goal: Pain management. Interventions: - Take pain meds prn. Reposition. - See additional Care Plan goals for specific interventions  4/14/2025 1105 by Zakiya Malone, RN  Outcome: Progressing  4/14/2025 1105 by Zakiya Malone, RN  Outcome: Progressing     Problem: CARDIOVASCULAR - ADULT  Goal: Absence of cardiac arrhythmias or at  baseline  Description: INTERVENTIONS:- Continuous cardiac monitoring, monitor vital signs, obtain 12 lead EKG if indicated- Evaluate effectiveness of antiarrhythmic and heart rate control medications as ordered- Initiate emergency measures for life threatening arrhythmias- Monitor electrolytes and administer replacement therapy as ordered  4/14/2025 1105 by Zakiya Malone, RN  Outcome: Progressing  4/14/2025 1105 by Zakiya Malone, RN  Outcome: Progressing

## 2025-04-14 NOTE — PLAN OF CARE
Pt is A&O x4. L arm pain managed w/ prn meds. Denies SOB & cardiac symptoms.  Maintaining O2 on RA. Cpap while sleeping. NS/ST on tele, S1&S2 present.  Bowel sounds active. Continent of bowel and bladder.  Small amount of red/brown drainage from R groin incision. Cleansed and dressing applied.  Pt updated on plan of care. BP control. Bed in lowest position. Bed alarm on. Call light within reach.     Problem: Diabetes/Glucose Control  Goal: Glucose maintained within prescribed range  Description: INTERVENTIONS:- Monitor Blood Glucose as ordered- Assess for signs and symptoms of hyperglycemia and hypoglycemia- Administer ordered medications to maintain glucose within target range- Assess barriers to adequate nutritional intake and initiate nutrition consult as needed- Instruct patient on self management of diabetes  Outcome: Progressing     Problem: CARDIOVASCULAR - ADULT  Goal: Absence of cardiac arrhythmias or at baseline  Description: INTERVENTIONS:- Continuous cardiac monitoring, monitor vital signs, obtain 12 lead EKG if indicated- Evaluate effectiveness of antiarrhythmic and heart rate control medications as ordered- Initiate emergency measures for life threatening arrhythmias- Monitor electrolytes and administer replacement therapy as ordered  Outcome: Progressing     Problem: Patient/Family Goals  Goal: Patient/Family Long Term Goal  Description: Patient's Long Term Goal: To go home. Interventions:- PT/OT. Trend labs/vs. - See additional Care Plan goals for specific interventions  Outcome: Progressing  Goal: Patient/Family Short Term Goal  Description: Patient's Short Term Goal: Pain management. Interventions: - Take pain meds prn. Reposition. - See additional Care Plan goals for specific interventions  Outcome: Progressing

## 2025-04-14 NOTE — PLAN OF CARE
Assumed care of patient at 0000~. Aox4. RA. CPAP NOC. Lung fields clear but diminished to BLL. ST on tele, S3 heart sound audible. Pt reports LUE pain. Continent of bowel and bladder. Last BM 4/13. 1x w/walker.  Bed locked and in lowest position. Call light and personal items within reach.      -L/Neck/Upper chest incision w/dressing in place w/small amount of dried blood to dressing  -L-Groin incision w/skin glue intact w/o drainage  -R-groin incision w/gauze tegederm dressing in place. Small amount of bloody drainage to dressing  -CMS intact 4x    POC  -Acute rehab [facility] (approved)  -Medication adjustments (uncontrolled BP) (Below 160's sys)  -Outpt heart monitor  -EP to see?    Problem: Diabetes/Glucose Control  Goal: Glucose maintained within prescribed range  Description: INTERVENTIONS:- Monitor Blood Glucose as ordered- Assess for signs and symptoms of hyperglycemia and hypoglycemia- Administer ordered medications to maintain glucose within target range- Assess barriers to adequate nutritional intake and initiate nutrition consult as needed- Instruct patient on self management of diabetes  Outcome: Progressing     Problem: Patient/Family Goals  Goal: Patient/Family Long Term Goal  Description: Patient's Long Term Goal: To go home. Interventions:- PT/OT. Trend labs/vs. - See additional Care Plan goals for specific interventions  Outcome: Progressing  Goal: Patient/Family Short Term Goal  Description: Patient's Short Term Goal: Pain management. Interventions: - Take pain meds prn. Reposition. - See additional Care Plan goals for specific interventions  Outcome: Progressing     Problem: CARDIOVASCULAR - ADULT  Goal: Absence of cardiac arrhythmias or at baseline  Description: INTERVENTIONS:- Continuous cardiac monitoring, monitor vital signs, obtain 12 lead EKG if indicated- Evaluate effectiveness of antiarrhythmic and heart rate control medications as ordered- Initiate emergency measures for life threatening  arrhythmias- Monitor electrolytes and administer replacement therapy as ordered  Outcome: Progressing

## 2025-04-14 NOTE — DISCHARGE SUMMARY
[facility]     Alisha Wilde Patient Status:  Inpatient    10/25/1965 MRN [id number]   Location [facility] 8NE-A Attending Wilman Atwood, *   Hosp Day # 16 PCP Bridger Avendano MD     Date of Admission: 3/30/2025  Date of Discharge:  4/15/2025     Discharge Disposition: Acute Care Short Term Hospital    Discharge Diagnosis:  #Tachycardia  #Bradycardia  #Sinus pause  -Patient had 4.4 second pause on telemetry overnight on   -Believed to be vagally mediated sinus pause postanesthesia and presumed during sleeping hours per cardiology  -Hold AV adilene agents  -Plan for MCT on discharge to evaluate rhythm  -Cardiology following  -EP consulted     #Aortic arch dissection with aneurysm  #Left renal artery dissection  -S/p left carotid to subclavian transposition, endovascular arch repair, stenting of the left renal, left iliac  -Repeat CTA  with some dissection within the left subclavian artery as well as some persistent flap with suboptimal expansion of the stent graft  -S/p thoracic stent graft, balloon angioplasty and stent right external iliac artery   -Lifelong Aspirin, Plavix  -Vascular surgery following     #Hypertension  -Carvedilol discontinued  -Amlodipine, hydralazine  -Started on hydrochlorothiazide by cardiology  -Hydralazine dose increased by cardiology  -Cardiology following     #Prior hx of Type A aortic dissection  -S/p repair 2024  -Post op complications with rep failure and NAIMA needing trach and peg that are now removed     #Sepsis 2/2 aspiration pneumonia   #Acute hypoxic respiratory failure, resolved  #Moderate left pleural effusion with atelectasis/pneumonia  -IV antibiotics     #Pericardial fluid noted on CTA  -Monitor closely     #Ileus, resolved  -Bowel regimen     #LUE weakness   -Suspected 2/2 to dissection/surgery     #Hyperlipidemia     #CKD stage 3     #TANYA     #Obesity, BMI 36       History of Present Illness: Alisha Wilde is a 59 year old  female with PMhx of GERD< HTN, DL, aortic dissection presents with chest pain and back pain. Pt was initially seen at [facility] and transferred to [facility] as she was found to have type B aortic dissection. She currently denies any CP. She is breathing ok. No F/C. No N/V/D/C or abd pain. She had her repair of her Type A aortic dissection on 11/21/24        Brief Synopsis: Patient was found to have aortic dissection and renal artery dissection. She was started on cardene and esmolol drips. Vascular surgery was consulted. Patient underwent left carotid to subclavian transposition, endovascular arch repair, stenting of the left renal, left iliac. Repeat CTA 4/5 showed some dissection within the left subclavian artery as well as some persistent flap with suboptimal expansion of the stent graft. Patient underwent thoracic stent graft, balloon angioplasty and stent right external iliac artery. Patient's telemetry showed sinus pause, tachycardia, and bradycardia. Cardiology was consulted. AV adilene agents were held. EP was consulted. PT OT recommended acute rehab. Patient was discharged home with outpatient follow up.    Lace+ Score: 78  59-90 High Risk  29-58 Medium Risk  0-28   Low Risk       TCM Follow-Up Recommendation:  LACE > 58: High Risk of readmission after discharge from the hospital.      Procedures during hospitalization:   Left carotid to subclavian transposition, endovascular arch repair, stenting of the left renal, left iliac  Thoracic stent graft, balloon angioplasty and stent right external iliac artery 4/7    Incidental or significant findings and recommendations (brief descriptions):  See brief synopsis above     Lab/Test results pending at Discharge:   None     Consultants:  Vascular surgery  Cardiology  EP    Discharge Medication List:     Discharge Medications        START taking these medications        Instructions Prescription details   clopidogrel 75 MG Tabs  Commonly known as: Plavix      Take 1 tablet (75  mg total) by mouth daily.   Stop taking on: May 12, 2025  Quantity: 30 tablet  Refills: 0     hydroCHLOROthiazide 12.5 MG Tabs      Take 1 tablet (12.5 mg total) by mouth daily.   Quantity: 30 tablet  Refills: 2            CHANGE how you take these medications        Instructions Prescription details   hydrALAZINE 100 MG Tabs  Commonly known as: Apresoline  What changed:   medication strength  how much to take  when to take this      Take 1 tablet (100 mg total) by mouth in the morning, at noon, and at bedtime.   Quantity: 90 tablet  Refills: 2            CONTINUE taking these medications        Instructions Prescription details   acetaminophen 500 MG Tabs  Commonly known as: Tylenol Extra Strength      Take 2 tablets (1,000 mg total) by mouth every 6 (six) hours as needed for Pain.   Refills: 0     amLODIPine 10 MG Tabs  Commonly known as: Norvasc      Take 1 tablet (10 mg total) by mouth daily.   Quantity: 90 tablet  Refills: 3     aspirin 81 MG Tbec      Take 1 tablet (81 mg total) by mouth daily.   Stop taking on: May 11, 2025  Quantity: 30 tablet  Refills: 0     azelastine 0.1 % Soln  Commonly known as: Astelin      1 spray by Nasal route 2 (two) times daily.   Quantity: 3 each  Refills: 1     bisacodyl 5 MG Tbec  Commonly known as: Dulcolax      Take 1 tablet (5 mg total) by mouth daily as needed.   Quantity: 30 tablet  Refills: 0     docusate sodium 100 MG Caps  Commonly known as: Colace      Take 1 capsule (100 mg total) by mouth 2 (two) times daily as needed for constipation.   Quantity: 180 capsule  Refills: 1     ezetimibe 10 MG Tabs  Commonly known as: Zetia      Take 1 tablet (10 mg total) by mouth daily.   Refills: 0     Ferrous Gluconate 324 (37.5 Fe) MG Tabs      Take 1 tablet (324 mg total) by mouth daily.   Quantity: 90 tablet  Refills: 1     mirtazapine 7.5 MG Tabs  Commonly known as: Remeron      1 tablet (7.5 mg total) by Per G Tube route nightly.   Quantity: 30 tablet  Refills: 0     nystatin  106402 UNIT/GM Powd      Apply 1 Application topically 3 (three) times daily.   Stop taking on: May 4, 2025  Quantity: 60 g  Refills: 2     pantoprazole 40 MG Tbec  Commonly known as: Protonix      Take 1 tablet (40 mg total) by mouth every morning before breakfast.   Quantity: 90 tablet  Refills: 3     thiamine 100 MG Tabs  Commonly known as: Vitamin B1      Take 1 tablet (100 mg total) by mouth daily.   Refills: 0            STOP taking these medications      carvedilol 25 MG Tabs  Commonly known as: Coreg        cloNIDine 0.3 MG/24HR Ptwk  Commonly known as: Catapres               ASK your doctor about these medications        Instructions Prescription details   amoxicillin clavulanate 875-125 MG Tabs  Commonly known as: Augmentin  Ask about: Should I take this medication?      Take 1 tablet by mouth 2 (two) times daily for 3 days.   Stop taking on: April 14, 2025  Quantity: 6 tablet  Refills: 0               Where to Get Your Medications        These medications were sent to [facility]/pharmacy #[id number] - [place], [place] - [address] [phone], [phone]  [address] [address]      Phone: [phone]   amoxicillin clavulanate 875-125 MG Tabs  aspirin 81 MG Tbec  clopidogrel 75 MG Tabs       Please  your prescriptions at the location directed by your doctor or nurse    Bring a paper prescription for each of these medications  hydrALAZINE 100 MG Tabs  hydroCHLOROthiazide 12.5 MG Tabs         [place]     Follow-up appointment:   Helen Moses APN  [address] [facility] [address]  [facility] [address] [phone]    Follow up in 2 week(s)      Bridger Avendano MD  [address] [address] [phone]    Schedule an appointment as soon as possible for a visit in 1 week(s)      Zeke Fierro MD  [address]  [facility] [id number] [phone]    Follow up  our office will call you for an appointment and heart monitor    Appointments for Next  30 Days 4/16/2025 - 5/16/2025        Date Arrival Time Visit Type Length Department Provider     4/18/2025  1:15 PM  [facility] CARD PHASE 2 [4683] 60 min [facility] Cardiopulmonary Re[facility] CARD PHASE 2 RM    Patient Instructions:         Location Instructions:     Masks are optional for all patients and visitors, unless otherwise indicated.               4/21/2025  1:15 PM  [facility] CARD PHASE 2 [2503] 60 min [facility] CARD PHASE 2 RM    Patient Instructions:         Location Instructions:     Masks are optional for all patients and visitors, unless otherwise indicated.               4/23/2025 10:40 AM  FOLLOW UP VISIT [2828] 20 min [facility] [place] Hina Mcarthur MD    Patient Instructions:         Location Instructions:     Your appointment is located at [address] [facility] in [place], [place].&nbsp; Please park in the [place] lot, enter through the [facility] entrance, and go to [address]. Note: A $50 fee will be charged for missed appointments or same-day cancellations. Please provide 24 hours' notice if you need to cancel or reschedule your appointment.  Masks are optional for all patients and visitors, unless otherwise indicated.               4/23/2025  1:15 PM  [facility] CARD PHASE 2 [9813] 60 min [facility] Card[facility] CARD PHASE 2 RM    Patient Instructions:         Location Instructions:     Masks are optional for all patients and visitors, unless otherwise indicated.               4/25/2025  1:15 PM  [facility] CARD PHASE 2 [4683] 60 min [facility] Card[facility] CARD PHASE 2 RM    Patient Instructions:         Location Instructions:     Masks are optional for all patients and visitors, unless otherwise indicated.               4/28/2025  1:15 PM  [facility] CARD PHASE 2 [4683] 60 min [facility] Card[facility] CF CARD PHASE 2 RM    Patient  Instructions:         Location Instructions:     Masks are optional for all patients and visitors, unless otherwise indicated.               4/30/2025  1:15 PM  [facility] CARD PHASE 2 [4683] 60 min [facility] Cardiopulmonary Rehab [facility] CARD PHASE 2 RM    Patient Instructions:         Location Instructions:     Masks are optional for all patients and visitors, unless otherwise indicated.               5/2/2025  1:15 PM  [facility] CARD PHASE 2 [4683] 60 min [facility] Cardiopulmonary Rehab [facility] CARD PHASE 2 RM    Patient Instructions:         Location Instructions:     Masks are optional for all patients and visitors, unless otherwise indicated.               5/5/2025  1:15 PM  [facility] CARD PHASE 2 [4683] 60 min [facility] Cardiopulmonary Rehab [facility] CARD PHASE 2 RM    Patient Instructions:         Location Instructions:     Masks are optional for all patients and visitors, unless otherwise indicated.               5/7/2025  1:15 PM  [facility] CARD PHASE 2 [4683] 60 min [facility] Cardiopulmonary Rehab [facility] CARD PHASE 2 RM    Patient Instructions:         Location Instructions:     Masks are optional for all patients and visitors, unless otherwise indicated.               5/9/2025  1:15 PM  [facility] CARD PHASE 2 [4683] 60 min [facility] Cardiopulmonary Rehab [facility] CARD PHASE 2 RM    Patient Instructions:         Location Instructions:     Masks are optional for all patients and visitors, unless otherwise indicated.               5/12/2025  1:15 PM  [facility] CARD PHASE 2 [4683] 60 min [facility] Cardiopulmonary Rehab [facility] CARD PHASE 2 RM    Patient Instructions:         Location Instructions:     Masks are optional for all patients and visitors, unless otherwise indicated.               5/14/2025  1:15 PM  [facility] CARD PHASE 2 [4683] 60 min [facility] Cardiopulmonary Rehab [facility] CARD PHASE 2 RM    Patient Instructions:         Location Instructions:      Masks are optional for all patients and visitors, unless otherwise indicated.               2025  1:15 PM  [facility] CARD PHASE 2 [4683] 60 min [facility] Cardiopulmonary Rehab [facility] CARD PHASE 2 RM    Patient Instructions:         Location Instructions:     Masks are optional for all patients and visitors, unless otherwise indicated.                      Vital signs:       Physical Exam:    General: No acute distress   Lungs: clear to auscultation  Cardiovascular: S1, S2  Abdomen: Soft      -----------------------------------------------------------------------------------------------  PATIENT DISCHARGE INSTRUCTIONS: See electronic chart    Danette Atwood,     Total time spent on discharge plannin minutes     The  [employer] Cures Act makes medical notes like these available to patients in the interest of transparency. Please be advised this is a medical document. Medical documents are intended to carry relevant information, facts as evident, and the clinical opinion of the practitioner. The medical note is intended as peer to peer communication and may appear blunt or direct. It is written in medical language and may contain abbreviations or verbiage that are unfamiliar.

## 2025-04-14 NOTE — PROGRESS NOTES
[facility]   part of [facility]     Hospitalist Progress Note     Alisha Wilde Patient Status:  Inpatient    10/25/1965 MRN [id number]   Location [facility] 4SW-A Attending Danette Atwood, DO   Hosp Day # 15 PCP Bridger Avendano MD     Chief Complaint: Back pain, chest pain    Subjective:     Patient resting in bed and has no complaints     Objective:    Review of Systems:   A comprehensive review of systems was completed; pertinent positive and negatives stated in subjective.    Vital signs:  Temp:  [97.9 °F (36.6 °C)-99 °F (37.2 °C)] 98.4 °F (36.9 °C)  Pulse:  [] 109  Resp:  [16-20] 20  BP: (150-169)/(75-91) 156/84  SpO2:  [95 %-100 %] 96 %    Physical Exam:    General: No acute distress  Respiratory: No wheezes, no rhonchi  Cardiovascular: S1, S2, regular rate and rhythm  Abdomen: Soft, Non-tender, non-distended, positive bowel sounds  Neuro: No new focal deficits.   Extremities: No edema    Diagnostic Data:    Labs:  Recent Labs   Lab 04/07/25  2019 04/08/25  0441 04/10/25  0514 25  0446 25  0742 25  05025  05   WBC 17.1*   < > 11.8* 10.3 9.3 10.6 9.6   HGB 9.7*   < > 8.8* 8.5* 8.5* 9.1* 8.9*   MCV 79.2*   < > 78.9* 81.1 80.3 80.1 79.3*   .0   < > 310.0 348.0 439.0 553.0* 539.0*   INR 1.19  --   --   --   --   --   --     < > = values in this interval not displayed.       Recent Labs   Lab 2542 25  05025  0528   * 106* 113*   BUN 8* 6* 7*   CREATSERUM 1.04* 1.18* 1.09*   CA 9.4 9.9 9.8   ALB 3.8 4.4 4.1    140 139   K 3.9 4.0 3.9    107 106   CO2 23.0 22.0 23.0   ALKPHO 60 65 58   AST 11 14 13   ALT <7* 7* 8*   BILT 0.4 0.4 0.4   TP 6.8 7.8 7.3       Estimated Glomerular Filtration Rate: 58 mL/min/1.73m2 (A) (result from lab).    No results for input(s): \"TROP\", \"TROPHS\", \"CK\" in the last 168 hours.    Recent Labs   Lab 25   PTP 15.2*   INR 1.19                  Microbiology    No results found for this  visit on 03/30/25.      Imaging: Reviewed in Epic.    Medications: Scheduled Medications[1]    Assessment & Plan:      #Tachycardia  #Bradycardia  #Sinus pause  -Patient had 4.4 second pause on telemetry overnight on 4/11  -Believed to be vagally mediated sinus pause postanesthesia and presumed during sleeping hours per cardiology  -Hold AV adilene agents  -Plan for MCT on discharge to evaluate rhythm  -Cardiology following  -EP consulted    #Aortic arch dissection with aneurysm  #Left renal artery dissection  -S/p left carotid to subclavian transposition, endovascular arch repair, stenting of the left renal, left iliac  -Repeat CTA 4/5 with some dissection within the left subclavian artery as well as some persistent flap with suboptimal expansion of the stent graft  -S/p thoracic stent graft, balloon angioplasty and stent right external iliac artery 4/7  -Lifelong Aspirin, Plavix  -Vascular surgery following    #Hypertension  -Carvedilol discontinued  -Amlodipine, hydralazine  -Started on hydrochlorothiazide by cardiology  -Hydralazine dose increased by cardiology  -Cardiology following    #Prior hx of Type A aortic dissection  -S/p repair 11/2024  -Post op complications with rep failure and NAIMA needing trach and peg that are now removed     #Sepsis 2/2 aspiration pneumonia   #Acute hypoxic respiratory failure, resolved  #Moderate left pleural effusion with atelectasis/pneumonia  -IV antibiotics    #Pericardial fluid noted on CTA  -Monitor closely     #Ileus, resolved  -Bowel regimen     #LUE weakness   -Suspected 2/2 to dissection/surgery     #Hyperlipidemia     #CKD stage 3     #TANYA     #Obesity, BMI 36    Accepted at [facility] for acute rehab    Danette Atwood DO    Supplementary Documentation:     Quality:  DVT Mechanical Prophylaxis:   SCDs, Early ambuation  DVT Pharmacologic Prophylaxis   Medication    enoxaparin (Lovenox) 40 MG/0.4ML SUBQ injection 40 mg         DVT Pharmacologic prophylaxis: Aspirin 81 mg       Code Status: Full Code  Webb: External urinary catheter in place  Webb Duration (in days):   Central line:    BRANDON: 4/14/2025    Discharge is dependent on: Clinical improvement  At this point Ms. Wilde is expected to be discharge to: TBD    The 21st Century Cures Act makes medical notes like these available to patients in the interest of transparency. Please be advised this is a medical document. Medical documents are intended to carry relevant information, facts as evident, and the clinical opinion of the practitioner. The medical note is intended as peer to peer communication and may appear blunt or direct. It is written in medical language and may contain abbreviations or verbiage that are unfamiliar.                         [1]    hydrALAZINE  100 mg Oral TID    hydrALAZINE  25 mg Oral Once    hydrochlorothiazide  12.5 mg Oral Daily    fluticasone propionate  1 spray Each Nare Daily    aspirin  81 mg Oral Daily    amLODIPine  10 mg Oral Daily    pantoprazole  40 mg Oral QAM AC    melatonin  3 mg Oral Nightly    enoxaparin  40 mg Subcutaneous Daily    clopidogrel  75 mg Oral Daily    ipratropium-albuterol  3 mL Nebulization Q6H WA    multivitamin  1 tablet Oral Daily    docusate sodium  100 mg Oral BID    polyethylene glycol (PEG 3350)  17 g Oral Daily

## 2025-04-14 NOTE — CM/SW NOTE
Received a message from Zakiya AGEE stating pt is ready for discharge. Spoke with Tanya at  admissions who has sent message to Toma to inform her of above. Tanya could not speak to availability of beds tonight.   Await a call back from Toma.     18:08  Received a call from Julienne at  who states they will take pt in the morning.  was unaware that pt was ready for discharge.     Ramírez Padgett, BEBETO RN, CM  [phone]

## 2025-04-15 VITALS
WEIGHT: 213.38 LBS | OXYGEN SATURATION: 100 % | DIASTOLIC BLOOD PRESSURE: 74 MMHG | HEART RATE: 111 BPM | SYSTOLIC BLOOD PRESSURE: 141 MMHG | BODY MASS INDEX: 36 KG/M2 | RESPIRATION RATE: 20 BRPM | TEMPERATURE: 98 F

## 2025-04-15 PROCEDURE — 99239 HOSP IP/OBS DSCHRG MGMT >30: CPT | Performed by: STUDENT IN AN ORGANIZED HEALTH CARE EDUCATION/TRAINING PROGRAM

## 2025-04-15 RX ORDER — HYDROCHLOROTHIAZIDE 12.5 MG/1
12.5 TABLET ORAL DAILY
Qty: 30 TABLET | Refills: 2 | Status: SHIPPED | OUTPATIENT
Start: 2025-04-15

## 2025-04-15 RX ORDER — HYDRALAZINE HYDROCHLORIDE 100 MG/1
100 TABLET, FILM COATED ORAL 3 TIMES DAILY
Qty: 90 TABLET | Refills: 2 | Status: SHIPPED | OUTPATIENT
Start: 2025-04-15

## 2025-04-15 NOTE — PROGRESS NOTES
Progress Note  Alisha Wilde Patient Status:  Inpatient    10/25/1965 MRN [id number]   Location [facility] 8NE-A Attending Danette Atwood, DO   Hosp Day # 16 PCP Bridger Avendano MD     Subjective:  Feels fine. No new complaints. Up in the chair    Objective:  /79 (BP Location: Right arm)   Pulse 104   Temp 98.4 °F (36.9 °C) (Oral)   Resp 20   Wt 213 lb 6.5 oz (96.8 kg)   SpO2 98%   BMI 35.51 kg/m²     Telemetry: SR, ST, mobitz I intermittent      Intake/Output:    Intake/Output Summary (Last 24 hours) at 4/15/2025 0934  Last data filed at 4/15/2025 0800  Gross per 24 hour   Intake 0 ml   Output 1000 ml   Net -1000 ml       Last 3 Weights   04/15/25 0633 213 lb 6.5 oz (96.8 kg)   25 0611 215 lb 8 oz (97.8 kg)   25 0622 216 lb 11.4 oz (98.3 kg)   25 0500 218 lb 4.8 oz (99 kg)   25 0500 209 lb 9.6 oz (95.1 kg)   25 0545 208 lb 15.9 oz (94.8 kg)   04/10/25 0530 227 lb 11.2 oz (103.3 kg)   25 0030 221 lb 12.5 oz (100.6 kg)   25 0300 233 lb 4 oz (105.8 kg)   25 0500 236 lb 5.3 oz (107.2 kg)   25 0400 246 lb 14.6 oz (112 kg)   25 0300 250 lb (113.4 kg)   25 0400 227 lb 11.8 oz (103.3 kg)   25 1654 222 lb 3.6 oz (100.8 kg)   25 1508 222 lb 3.6 oz (100.8 kg)   25 0725 210 lb (95.3 kg)   25 1312 230 lb (104.3 kg)       Labs:  Recent Labs   Lab 25  0725  0501 25  0528   * 106* 113*   BUN 8* 6* 7*   CREATSERUM 1.04* 1.18* 1.09*   EGFRCR 61 53* 58*   CA 9.4 9.9 9.8   ALB 3.8 4.4 4.1    140 139   K 3.9 4.0 3.9    107 106   CO2 23.0 22.0 23.0   ALKPHO 60 65 58   AST 11 14 13   ALT <7* 7* 8*   BILT 0.4 0.4 0.4   TP 6.8 7.8 7.3     Recent Labs   Lab 25  0742 25  0501 25  0528   RBC 3.35* 3.57* 3.47*   HGB 8.5* 9.1* 8.9*   HCT 26.9* 28.6* 27.5*   MCV 80.3 80.1 79.3*   MCH 25.4* 25.5* 25.6*   MCHC 31.6 31.8 32.4   RDW 17.7 17.7 17.6   NEPRELIM 6.92 7.31 7.01   WBC 9.3 10.6  9.6   .0 553.0* 539.0*         No results for input(s): \"TROP\", \"TROPHS\", \"CK\" in the last 168 hours.  Lab Results   Component Value Date    INR 1.19 04/07/2025    INR 1.18 04/03/2025    INR 1.14 04/01/2025       Diagnostics:     Review of Systems   Constitutional: Positive for malaise/fatigue.   Cardiovascular: Negative.    Respiratory: Negative.         Physical Exam:    Gen: Alert, oriented x 3, in no apparent distress  Heent: Pupils equal, reactive. Mucous membranes moist.   Cardiac: Regular rate and rhythm, normal S1,S2  Lungs: Clear to auscultation  Abd: Soft, non tender, non distended  Ext: No edema  Skin: Warm, dry          Medications:    Scheduled Medications[1]  Medication Infusions[2]        Assessment:  Tachy/Bradycardia with pauses (up to 4.4 seconds )   AV adilene blocking agents have remained on hold   asymptomatic, Mobitz 1  HTN  - 153/79  On amlodipine 10 mg po, Hydralazine 100 mg po TID  BB stopped due to bradycardia.   Ok to use clonidine per Dr. Sanders as no type II Block if needed. BP currently controlled.   S/p ascending aortic aneurysm repair last year, w/p endoleak repair of thoracic stent graft 4/11/25 by vascular surgery  On ASA, plavix  Previous underwent left carotid to subclavian transposition, endovascular arch repair, stenting of the left renal and iliac arteries 4/2 Dr. Holder  Hyperlipidemia  ETOH abuse  Obesity   CKD      Plan:  Discharge planning to  today  Will get MCT after discharge from  and follow up with Dr. Sanders.   Continue current antihypertensives, ok to add clonidine if needed.       Plan of care discussed with patient, RN.    STACEY Theodore  4/15/2025  9:34 AM         [1]    hydrALAZINE  100 mg Oral TID    hydrALAZINE  25 mg Oral Once    hydrochlorothiazide  12.5 mg Oral Daily    fluticasone propionate  1 spray Each Nare Daily    aspirin  81 mg Oral Daily    amLODIPine  10 mg Oral Daily    pantoprazole  40 mg Oral QAM AC    melatonin  3 mg Oral  Nightly    enoxaparin  40 mg Subcutaneous Daily    clopidogrel  75 mg Oral Daily    ipratropium-albuterol  3 mL Nebulization Q6H WA    multivitamin  1 tablet Oral Daily    docusate sodium  100 mg Oral BID    polyethylene glycol (PEG 3350)  17 g Oral Daily   [2]    nitroGLYCERIN in dextrose 5%

## 2025-04-15 NOTE — PLAN OF CARE
Report called to Halima at [facility].     Pt discharged to [facility]. IV removed. Tele dc'd and returned to monitor tech. Follow up instructions provided and sent with patient. Pt verbalized understanding. Pt wheeled [facility] by [facility] staff with all belongings. Pt left denying complaints of pain, malaise, or cardiac symptoms. All needs met by staff.

## 2025-04-15 NOTE — CM/SW NOTE
Anticipate pt is going to be cleared to discharge to [facility] today. [facility] set up for will call . PCS form completed/printed. Toma malcolm  aware of the plan and will provide room # once available.     Josette IZQUIERDO, LSW  Discharge Planner       No vaginal bleeding or discharge.  No contractions.  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  Sooner if concerns.

## 2025-04-15 NOTE — PLAN OF CARE
Assumed care of patient at 1930. Aox4. RA,CPAP NOC.Lung fields clear bilaterally, diminished to bases. ST on tele, while sleeping has intermittent runs of 2nd AVB T1. Pt reports left arm pain that is intermittent. Continent of bowel and bladder. Last BM 4/13. 1x w/walker.  Bed locked and in lowest position. Call light and personal items within reach.       -L/Neck/Upper chest incision w/dressing in place w/small amount of dried blood to dressing  -L-Groin incision w/skin glue intact w/o drainage  -R-groin incision w/gauze tegederm dressing in place. Small amount of bloody drainage to dressing  -CMS intact 4x    POC   -Acute rehab [facility] (approved, needs room/bed ordered*)   -Medication adjustments (uncontrolled BP, maintain below 150's)   -Outpt heart monitor       Problem: Diabetes/Glucose Control  Goal: Glucose maintained within prescribed range  Description: INTERVENTIONS:- Monitor Blood Glucose as ordered- Assess for signs and symptoms of hyperglycemia and hypoglycemia- Administer ordered medications to maintain glucose within target range- Assess barriers to adequate nutritional intake and initiate nutrition consult as needed- Instruct patient on self management of diabetes  Outcome: Progressing     Problem: Patient/Family Goals  Goal: Patient/Family Long Term Goal  Description: Patient's Long Term Goal: To go home. Interventions:- PT/OT. Trend labs/vs. - See additional Care Plan goals for specific interventions  Outcome: Progressing  Goal: Patient/Family Short Term Goal  Description: Patient's Short Term Goal: Pain management. Interventions: - Take pain meds prn. Reposition. - See additional Care Plan goals for specific interventions  Outcome: Progressing     Problem: CARDIOVASCULAR - ADULT  Goal: Absence of cardiac arrhythmias or at baseline  Description: INTERVENTIONS:- Continuous cardiac monitoring, monitor vital signs, obtain 12 lead EKG if indicated- Evaluate effectiveness of antiarrhythmic and heart  rate control medications as ordered- Initiate emergency measures for life threatening arrhythmias- Monitor electrolytes and administer replacement therapy as ordered  Outcome: Progressing

## 2025-04-15 NOTE — PHYSICAL THERAPY NOTE
PHYSICAL THERAPY TREATMENT NOTE - INPATIENT    Room Number: 8609/8609-A       Session: 5  Number of Visits to Meet Established Goals: 10 (5 additional sessions added)    Presenting Problem: Endovascular repair of aortic arch 4/2  Co-Morbidities : CKD, previous aneurysm repair, DM, HTN, HL, obesity    History related to current admission: Patient is a 59 year old female admitted on 3/30/2025 from home for several week c/o of chest and subscapular pain.  Pt diagnosed with aortic arch aneurysm.  Pt is now s/p LEFT CAROTID TO SUBCLAVIAN TRANSPOSITION, ENDOVASCULAR ARCH REPAIR. STENTING OF THE LEFT RENAL, LEFT ILIAC on 4/2/25.  Pt w/ additional procedure on 4/7 - thoracic endograft repair for endoleak     HOME SITUATION  Type of Home: Condo  Home Layout: One level  Stairs to Enter : 0        Stairs to Bedroom: 0         Lives With: Alone    Drives: Yes           Prior Level of [place]: Pt is typically independent w/ adl's, gait w/ cane in right hand, drives and is currently on short term disability w/ [employer].    PHYSICAL THERAPY ASSESSMENT   Patient demonstrates fair progress this session, goals  remain in progress.      Patient is requiring stand-by assist and contact guard assist as a result of the following impairments: decreased functional strength, decreased endurance/aerobic capacity, impaired standing balance, decreased muscular endurance, medical status, and limited L elbow and shoulder active ROM.     Patient continues to function below baseline with bed mobility, transfers, gait, and performing household tasks.  Next session anticipate patient to progress bed mobility, transfers, and gait.  Physical therapy will continue to follow patient for duration of hospitalization.    Patient continues to benefit from continued skilled PT services: to facilitate return to prior level of function as patient demonstrates high motivation with excellent tolerance to an intensive therapy program .  Pt w/ new L shoulder and  biceps weakness and pain since this admission limiting some of her functional activity.  Pt relying on rw for gait which is not her baseline. Pt lives alone and had been independent prior to admission (used a cane).  Pt is not near her baseline level at this time.    PLAN DURING HOSPITALIZATION  Nursing Mobility Recommendation : 1 Assist  PT Device Recommendation: Rolling walker  PT Treatment Plan: Bed mobility, Endurance, Patient education, Family education, Gait training, Range of motion, Transfer training, Balance training  Frequency (Obs): 3-5x/week     CURRENT GOALS     Goal #1 Patient is able to demonstrate supine - sit EOB @ level: modified independent      Goal #2 Patient is able to demonstrate transfers EOB to/from [facility] at assistance level: modified independent      Goal #3 Patient is able to ambulate 250 feet with assist device:  least restrictive device  at assistance level: supervision      Goal #4     Goal #5     Goal #6     Goal Comments: Goals established on 4/3/2025  4/15/2025 all goals ongoing    SUBJECTIVE  Pt c/o lightheadedness at rest.     OBJECTIVE  Precautions: Bed/chair alarm    WEIGHT BEARING RESTRICTION     PAIN ASSESSMENT   Rating: 3  Location: L shoulder  Management Techniques: Relaxation, Repositioning, Activity promotion    BALANCE                                                                                                                       Static Sitting: Fair  Dynamic Sitting: Fair           Static Standing: Fair -  Dynamic Standing: Poor +    ACTIVITY TOLERANCE  O2 Saturation: 100%  Room air  Heart Rate: at rest 108 bpm and with activity 116-121  Blood Pressure: Sitting 142/84      AM-PAC '6-Clicks' INPATIENT SHORT FORM - BASIC MOBILITY  How much difficulty does the patient currently have...  Patient Difficulty: Turning over in bed (including adjusting bedclothes, sheets and blankets)?: None   Patient Difficulty: Sitting down on and standing up from a chair with arms (e.g.,  wheelchair, bedside commode, etc.): A Little   Patient Difficulty: Moving from lying on back to sitting on the side of the bed?: A Little   How much help from another person does the patient currently need...   Help from Another: Moving to and from a bed to a chair (including a wheelchair)?: A Little   Help from Another: Need to walk in hospital room?: A Little   Help from Another: Climbing 3-5 steps with a railing?: A Little     AM-PAC Score:  Raw Score: 19   Approx Degree of Impairment: 41.77%   Standardized Score (AM-PAC Scale): 45.44   CMS Modifier (G-Code): CK    FUNCTIONAL ABILITY STATUS  Gait Assessment   Functional Mobility/Gait Assessment  Gait Assistance: Contact guard assist  Distance (ft): 200  Assistive Device: Rolling walker  Pattern:  (step through gait pattern, decreased gait speed)    Skilled Therapy Provided    Bed Mobility:  Rolling: NT  Supine<>Sit: NT   Sit<>Supine: NT    Transfer Mobility:  Sit<>Stand: SBA  Gait: CGA with RW     Therapist's Comments: Pt with slow transitions from sit>stand>ambulation due to lightheadedness.  Pt reports no change to minimal improvement in lightheadedness with activity.  LE seated HEP (ankle pumps, alternating hip flexion, LAQ, hip ab/adduction, and chair push ups) issued to pt via handout to be completed x5-10 reps, BID-TID.    THERAPEUTIC EXERCISES  Sitting Exercises Alternating marching  Heel raises  LAQ  Toe raises     Standing Exercises Mini Squats  Calf Raises     Position Sitting & Standing     Repetitions BLE x5-10 reps   Sets x1       Patient End of Session: Up in chair, Needs met, Call light within reach, RN aware of session/findings, All patient questions and concerns addressed, Alarm set    PT Session Time: 23 minutes  Gait Training: 15 minutes  Therapeutic Exercise: 8 minutes

## 2025-04-15 NOTE — CM/SW NOTE
04/15/25 1000   Discharge disposition   Expected discharge disposition Short Term H   Post Acute Care Provider Vic  ([facility])   Discharge transportation [facility]     Pt cleared to discharge to [facility] today. [facility] arranged for 1:00pm. PCS form completed/printed. RN updated. Toma malcolm  aware of the plan.   Pt going to room # 2216.     RN to call report at dc: [phone].     [facility]: [phone] or [phone]    Josette IZQUIERDO, LSW  Discharge Planner

## 2025-04-15 NOTE — PLAN OF CARE
Assumed care at 0730. Pt alert, oriented x4. Oxygen saturation adequate on room air. Lung sounds diminished bilaterally. Tele: NSR. Continent. Denies pain. Ambulates standby, up to chair this morning. Bilateral groin sites open to air, clean, dry, intact, soft, no hematoma noted. Plan of care: discharge today to [facility] pending placement, outpatient heart monitor after patient is discharged from [facility] per cardiology, shower today. Patient updated on plan of care. Questions answered.     Problem: Diabetes/Glucose Control  Goal: Glucose maintained within prescribed range  Description: INTERVENTIONS:- Monitor Blood Glucose as ordered- Assess for signs and symptoms of hyperglycemia and hypoglycemia- Administer ordered medications to maintain glucose within target range- Assess barriers to adequate nutritional intake and initiate nutrition consult as needed- Instruct patient on self management of diabetes  Outcome: Progressing     Problem: Patient/Family Goals  Goal: Patient/Family Long Term Goal  Description: Patient's Long Term Goal: To go home. Interventions:- PT/OT. Trend labs/vs. - See additional Care Plan goals for specific interventions  Outcome: Progressing  Goal: Patient/Family Short Term Goal  Description: Patient's Short Term Goal: Pain management. Interventions: - Take pain meds prn. Reposition. - See additional Care Plan goals for specific interventions  Outcome: Progressing     Problem: CARDIOVASCULAR - ADULT  Goal: Absence of cardiac arrhythmias or at baseline  Description: INTERVENTIONS:- Continuous cardiac monitoring, monitor vital signs, obtain 12 lead EKG if indicated- Evaluate effectiveness of antiarrhythmic and heart rate control medications as ordered- Initiate emergency measures for life threatening arrhythmias- Monitor electrolytes and administer replacement therapy as ordered  Outcome: Progressing

## 2025-04-15 NOTE — OCCUPATIONAL THERAPY NOTE
OCCUPATIONAL THERAPY TREATMENT NOTE - INPATIENT     Room Number: 8609/8609-A  Session: 5   Number of Visits to Meet Established Goals: 5 (5 additional sessions added)    Presenting Problem: s/p endovascular aortic arch repair 4/2    History: Patient is a 59 year old female admitted on 3/30/2025 with Presenting Problem: s/p endovascular aortic arch repair 4/2. Co-Morbidities : CKD, previous aneurysm repair, DM, HTN, HL, obesity     Patient is a 59 year old female admitted on 3/30/2025 from home for several week c/o of chest and subscapular pain.  Pt diagnosed with aortic arch aneurysm.  Pt is now s/p LEFT CAROTID TO SUBCLAVIAN TRANSPOSITION, ENDOVASCULAR ARCH REPAIR. STENTING OF THE LEFT RENAL, LEFT ILIAC - per chart on 4/2        HOME SITUATION  Type of Home: Condo  Home Layout: One level  Lives With: Alone     Toilet and Equipment: Standard height toilet  Shower/Tub and Equipment: Tub-shower combo, Shower chair     Occupation/Status: ST disability from [employer]  Hand Dominance: Right  Drives: Yes     Prior Level of Function: Pt reports independence with ADL and ambulation with cane prior to admit.    ASSESSMENT   Patient demonstrates excellent progress this session, goals remain in progress.    Patient continues to function below baseline with toileting, bathing, upper body dressing, lower body dressing, bed mobility, and transfers.   Contributing factors to remaining limitations include decreased functional strength, decreased functional reach, decreased endurance, pain, impaired standing balance, and limited L UE strength and ROM.  Next session anticipate patient to progress toileting, bathing, upper body dressing, lower body dressing, bed mobility, and transfers.  Occupational Therapy will continue to follow patient for duration of hospitalization.    Patient continues to benefit from continued skilled OT services: to facilitate return to prior level of function as patient demonstrates high motivation with excellent  tolerance to an intensive therapy program.         WEIGHT BEARING RESTRICTION       Recommendations for nursing staff:   Transfers: SBA with RW  Toileting location: toilet     TREATMENT SESSION:  Patient Start of Session: Pt was found sitting in her chair.     FUNCTIONAL TRANSFER ASSESSMENT  Sit to Stand: Chair  Edge of Bed: Stand-by Assist  Chair: Contact Guard Assist    BED MOBILITY  Supine to Sit : Stand-by Assist  Sit to Supine (OT): Stand-by Assist  Scooting: Supervision    BALANCE ASSESSMENT  Static Sitting: Supervision  Static Standing: Stand-by Assist    FUNCTIONAL ADL ASSESSMENT  Eating: Modified Independent (increased time for opening packages)  Grooming Standing: Stand-by Assist (brushed teeth at sink)  LB Dressing Seated: Supervision (donned shoes)  LB Dressing Standing: Stand-by Assist (donned/doffed brief at toilet)  Toileting Seated: Stand-by Assist  Toileting Standing: Not Tested    ACTIVITY TOLERANCE:                          O2 SATURATIONS       EDUCATION PROVIDED  Patient Education : Role of Occupational Therapy; Plan of Care; Discharge Recommendations; Functional Transfer Techniques; Fall Prevention  Patient's Response to Education: Verbalized Understanding    Equipment used: RW, gait belt  Demonstrates functional use, Would benefit from additional trial      Exercises:    Exercises Repetitions Comments   Scapular elevation 5    Scapular retraction     Shoulder rolls     Shoulder flexion 10    Shoulder abduction 19    Shoulder internal/external rotation 10    Forward punch     Elbow flexion 5    Elbow extension 5    Forearm pronation/supination 5    Wrist flexion/extension 5    Gross grasp/fist pumps 5    Ankle pumps     Knee extension     Marching         Therapist comments: sitting in chair>stand to RW>walk within hallway>sitting in chair>PROM to LUE x 10 reps each for shoulder flex/abd/IR/ER, AROM for elbow flex/ext, forearm sup/pron, wrist ext/flex, grasping and SROM for scap  elevation/depression and abd/add>sitting in chair       Patient End of Session: Up in chair, Needs met, Call light within reach, RN aware of session/findings, All patient questions and concerns addressed, Hospital anti-slip socks, Alarm set    SUBJECTIVE  \"This left arm is not doing so well...\"    PAIN ASSESSMENT  Rating: Unable to rate  Location: L UE  Management Techniques: Relaxation, Repositioning     OBJECTIVE  Precautions: Bed/chair alarm    AM-PAC ‘6-Clicks’ Inpatient Daily Activity Short Form  -   Putting on and taking off regular lower body clothing?: A Little  -   Bathing (including washing, rinsing, drying)?: A Little  -   Toileting, which includes using toilet, bedpan or urinal? : A Little  -   Putting on and taking off regular upper body clothing?: None  -   Taking care of personal grooming such as brushing teeth?: None  -   Eating meals?: None    AM-PAC Score:  Score: 21  Approx Degree of Impairment: 32.79%  Standardized Score (AM-PAC Scale): 44.27    PLAN  OT Device Recommendations: TBD  OT Treatment Plan: Balance activities, ADL training, Functional transfer training, Patient/Family education, Patient/Family training, Compensatory technique education, Energy conservation/work simplification techniques, UE strengthening/ROM, Endurance training, Equipment eval/education, Fine motor coordination activities, Neuromuscluar reeducation  Rehab Potential : Good  Frequency: 3-5x/week        OT Goals:   ADL Goals all goals ongoing as of 4/15, LS  Patient will perform grooming: with modified independent  Patient will perform lower body dressing:  with modified independent  Patient will perform toileting: with modified independent     Functional Transfer Goals  Patient will transfer to toilet:  with modified independent     UE Exercise Program Goal  Patient will be supervision with left AROM/AAROM HEP (home exercise program).     New Goal 4/7/25:  PHQ9 screening    OT Session Time: 30 minutes  Therapeutic  Activity: 15 minutes  Therapeutic Exercise: 15 minutes

## 2025-04-15 NOTE — PROGRESS NOTES
[facility]   part of [facility]     Vascular Surgery Progress Note    Alisha Wilde Patient Status:  Inpatient    10/25/1965 MRN [id number]   Location [facility] 8NE-A Attending Danette Atwood, DO   Hosp Day # 16 PCP Bridger Avendano MD     Objective:   Temp: 98.4 °F (36.9 °C)  Pulse: 104  Resp: 20  BP: 153/79      Events Yesterday/Overnight:  Patient is a 59 year old female, POD 8 thoracic stent graft with balloon angioplasty and stent of the right external iliac artery with Dr. Holder. Patient previously underwent a left carotid to subclavian transposition, endovascular arch repair, stenting of the left renal and iliac arteries with Dr. Holder on . Patient is doing well. /79. Eating and drinking without difficulty. Patient states left arm abduction has improved, flexion continues to improve. She has been accept to [facility].       Exam:  Left groin access site flat, no drainage.  Right groin access site, serosanguineous drainage present.  No signs or symptoms of infection.  Neurologically intact.  Able to move upper and lower extremities.  Decrease in left arm flexion, at postop baseline.  Palpable bilateral DP and PT pulses.  Left neck incision site healing well, no drainage, flat.  Steri-Strips removed.    Impression/Plan:    Patient to shower today.  Wash bilateral groin access sites with soap and water.  Pat dry.    Place a 4 x 4 gauze over right groin with paper tape.  No Tegaderm needed.  Will continue to allow to drain.    Continue physical therapy.    Patient cleared for discharge to rehab from a vascular surgery standpoint.      Follow-up in 2 weeks with NP in clinic.          Medications:  Current Hospital Medications[1]    Laboratory/Data:    LABS:  Recent Labs   Lab 04/10/25  0514 25  0446 25  0742 25  0501 25  0528   WBC 11.8* 10.3 9.3 10.6 9.6   HGB 8.8* 8.5* 8.5* 9.1* 8.9*   MCV 78.9* 81.1 80.3 80.1 79.3*   .0 348.0 439.0 553.0* 539.0*        Recent Labs   Lab 04/10/25  0514 04/11/25  0446 04/12/25  0742 04/13/25  0501 04/14/25  0528    139 141 140 139   K 3.8  3.8 4.2  4.2 3.9 4.0 3.9    106 107 107 106   CO2 24.0 24.0 23.0 22.0 23.0   BUN 7* 8* 8* 6* 7*   CREATSERUM 1.12* 1.13* 1.04* 1.18* 1.09*   * 106* 109* 106* 113*   CA 9.2 9.3 9.4 9.9 9.8   MG 2.2 2.2 2.2 2.4 2.2   PHOS 3.0  3.0 4.0 3.9 3.7 4.1     No results for input(s): \"PTP\", \"INR\", \"PTT\" in the last 168 hours.  Recent Labs   Lab 04/10/25  0514 04/11/25  0446 04/12/25  0742 04/13/25  0501 04/14/25  0528   ALT <7* <7* <7* 7* 8*   AST 11 11 11 14 13   ALB 3.9 3.8 3.8 4.4 4.1     No results for input(s): \"TROP\" in the last 168 hours.  Lab Results   Component Value Date    ANASCRN Negative 06/05/2021     No results for input(s): \"PCACT\", \"PSACT\", \"AT3ACT\", \"HIPAB\", \"PATHI\", \"STALA\", \"DRVVTRATIO\", \"DRVVT\", \"STACLOT\", \"CARIGG\", \"Z2CE7IJBTZ\", \"R0KP4OWZNN\", \"RA\", \"HAVIGM\", \"HBCIGM\", \"HCVAB\", \"HBSAG\", \"HBCAB\", \"HBVDNAINTERP\", \"ANAS\", \"C3\", \"C4\" in the last 168 hours.    STACEY Murphy  4/15/2025  9:08 AM         [1]   Current Facility-Administered Medications   Medication Dose Route Frequency    hydrALAZINE (Apresoline) tab 100 mg  100 mg Oral TID    hydrALAZINE (Apresoline) tab 25 mg  25 mg Oral Once    hydroCHLOROthiazide tab 12.5 mg  12.5 mg Oral Daily    fluticasone propionate (Flonase) 50 MCG/ACT nasal suspension 1 spray  1 spray Each Nare Daily    aspirin DR tab 81 mg  81 mg Oral Daily    amLODIPine (Norvasc) tab 10 mg  10 mg Oral Daily    pantoprazole (Protonix) DR tab 40 mg  40 mg Oral QAM AC    melatonin tab 3 mg  3 mg Oral Nightly    iodixanol (VISIPAQUE) 320 MG/ML injection 100 mL  100 mL Injection ONCE PRN    acetaminophen (Tylenol) tab 650 mg  650 mg Oral Q4H PRN    Or    HYDROcodone-acetaminophen (Norco) 5-325 MG per tab 1 tablet  1 tablet Oral Q4H PRN    Or    HYDROcodone-acetaminophen (Norco) 5-325 MG per tab 2 tablet  2 tablet Oral Q4H PRN     ondansetron (Zofran) 4 MG/2ML injection 4 mg  4 mg Intravenous Q6H PRN    prochlorperazine (Compazine) 10 MG/2ML injection 5 mg  5 mg Intravenous Q8H PRN    enoxaparin (Lovenox) 40 MG/0.4ML SUBQ injection 40 mg  40 mg Subcutaneous Daily    metoprolol (Lopressor) 5 mg/5mL injection 2.5 mg  2.5 mg Intravenous Q20 Min PRN    nitroGLYCERIN in dextrose 5% 50 mg/250mL infusion premix  5-400 mcg/min Intravenous Continuous PRN    clopidogrel (Plavix) tab 75 mg  75 mg Oral Daily    ipratropium-albuterol (Duoneb) 0.5-2.5 (3) MG/3ML inhalation solution 3 mL  3 mL Nebulization Q6H WA    bisacodyl (Dulcolax) 10 MG rectal suppository 10 mg  10 mg Rectal Daily PRN    multivitamin (Tab-A-Treasure/Beta Carotene) tab 1 tablet  1 tablet Oral Daily    simethicone (Mylicon) chewable tab 160 mg  160 mg Oral QID PRN    docusate sodium (Colace) cap 100 mg  100 mg Oral BID    polyethylene glycol (PEG 3350) (Miralax) 17 g oral packet 17 g  17 g Oral Daily

## 2025-04-17 NOTE — PAYOR COMM NOTE
--------------  DISCHARGE REVIEW    Payor: [employer] CHOICE/HMO/POS/EPO  Subscriber #:  [id number]  Authorization Number: [id number]    Admit date: 3/30/25  Admit time:   3:05 PM  Discharge Date: 4/15/2025  2:15 PM     [facility]     Alisha Wilde Patient Status:  Inpatient    10/25/1965 MRN [id number]   Location [facility] 8NE-A Attending Wilman Atwood, *   Hosp Day # 16 PCP Bridger Avendano MD     Date of Admission: 3/30/2025  Date of Discharge:  4/15/2025     Discharge Disposition: Acute Care Short Term Hospital    Discharge Diagnosis:  #Tachycardia  #Bradycardia  #Sinus pause  -Patient had 4.4 second pause on telemetry overnight on   -Believed to be vagally mediated sinus pause postanesthesia and presumed during sleeping hours per cardiology  -Hold AV adilene agents  -Plan for MCT on discharge to evaluate rhythm  -Cardiology following  -EP consulted     #Aortic arch dissection with aneurysm  #Left renal artery dissection  -S/p left carotid to subclavian transposition, endovascular arch repair, stenting of the left renal, left iliac  -Repeat CTA  with some dissection within the left subclavian artery as well as some persistent flap with suboptimal expansion of the stent graft  -S/p thoracic stent graft, balloon angioplasty and stent right external iliac artery   -Lifelong Aspirin, Plavix  -Vascular surgery following     #Hypertension  -Carvedilol discontinued  -Amlodipine, hydralazine  -Started on hydrochlorothiazide by cardiology  -Hydralazine dose increased by cardiology  -Cardiology following     #Prior hx of Type A aortic dissection  -S/p repair 2024  -Post op complications with rep failure and NAIMA needing trach and peg that are now removed     #Sepsis 2/2 aspiration pneumonia   #Acute hypoxic respiratory failure, resolved  #Moderate left pleural effusion with atelectasis/pneumonia  -IV antibiotics     #Pericardial fluid noted on CTA  -Monitor  closely     #Ileus, resolved  -Bowel regimen     #LUE weakness   -Suspected 2/2 to dissection/surgery     #Hyperlipidemia     #CKD stage 3     #TANYA     #Obesity, BMI 36       Brief Synopsis: Patient was found to have aortic dissection and renal artery dissection. She was started on cardene and esmolol drips. Vascular surgery was consulted. Patient underwent left carotid to subclavian transposition, endovascular arch repair, stenting of the left renal, left iliac. Repeat CTA 4/5 showed some dissection within the left subclavian artery as well as some persistent flap with suboptimal expansion of the stent graft. Patient underwent thoracic stent graft, balloon angioplasty and stent right external iliac artery. Patient's telemetry showed sinus pause, tachycardia, and bradycardia. Cardiology was consulted. AV adilene agents were held. EP was consulted. PT OT recommended acute rehab. Patient was discharged home with outpatient follow up.

## 2025-04-21 ENCOUNTER — APPOINTMENT (OUTPATIENT)
Dept: CARDIAC REHAB | Facility: HOSPITAL | Age: 60
End: 2025-04-21
Attending: INTERNAL MEDICINE
Payer: COMMERCIAL

## 2025-04-21 ENCOUNTER — TELEPHONE (OUTPATIENT)
Facility: CLINIC | Age: 60
End: 2025-04-21

## 2025-04-21 NOTE — TELEPHONE ENCOUNTER
Patient is unable to attend 4/23/2025 appointment but does not want to wait until September for follow up.  Requesting to be seen sooner.  Please call.  Thank you.

## 2025-04-22 NOTE — TELEPHONE ENCOUNTER
Left patient a message for patient to call us back. Informed her that all we can do is schedule a new appointment and then add her to a wait list

## 2025-04-23 ENCOUNTER — APPOINTMENT (OUTPATIENT)
Dept: CARDIAC REHAB | Facility: HOSPITAL | Age: 60
End: 2025-04-23
Attending: INTERNAL MEDICINE
Payer: COMMERCIAL

## 2025-04-25 ENCOUNTER — APPOINTMENT (OUTPATIENT)
Dept: CARDIAC REHAB | Facility: HOSPITAL | Age: 60
End: 2025-04-25
Attending: INTERNAL MEDICINE
Payer: COMMERCIAL

## 2025-04-25 ENCOUNTER — TELEPHONE (OUTPATIENT)
Dept: FAMILY MEDICINE CLINIC | Facility: CLINIC | Age: 60
End: 2025-04-25

## 2025-04-25 NOTE — TELEPHONE ENCOUNTER
[employer] called to confirm receipt of Protected health information request sent 4/06/25.  Valid authorization on file.  Informed rep that no form located.  Rep will resend request to Forms Email.

## 2025-04-28 ENCOUNTER — TELEPHONE (OUTPATIENT)
Dept: FAMILY MEDICINE CLINIC | Facility: CLINIC | Age: 60
End: 2025-04-28

## 2025-04-28 ENCOUNTER — NURSE TRIAGE (OUTPATIENT)
Dept: FAMILY MEDICINE CLINIC | Facility: CLINIC | Age: 60
End: 2025-04-28

## 2025-04-28 NOTE — TELEPHONE ENCOUNTER
Outpatient Medication Detail     Disp Refills Start End    pantoprazole 40 MG Oral Tab EC 90 tablet 3 3/6/2025 --    Sig - Route: Take 1 tablet (40 mg total) by mouth every morning before breakfast. - Oral    Sent to pharmacy as: Pantoprazole Sodium 40 MG Oral Tablet Delayed Release (Protonix)    E-Prescribing Status: Receipt confirmed by pharmacy (3/6/2025 12:20 PM CST)      Associated Diagnoses    Gastroesophageal reflux disease, unspecified whether esophagitis present        Pharmacy    [facility] #[id number] - [place], [place] - [address] [phone], [phone]

## 2025-04-28 NOTE — TELEPHONE ENCOUNTER
Action Requested: Summary for Provider     []  Critical Lab, Recommendations Needed  [] Need Additional Advice  []   FYI    []   Need Orders  [] Need Medications Sent to Pharmacy  []  Other     SUMMARY: Office visit    Future Appointments   Date Time Provider Department Center   4/28/2025  1:15 PM [facility] CARD PHASE 2 RM [facility] CP REHAB [facility]   4/30/2025  1:15 PM [facility] CARD PHASE 2 RM [facility] CP REHAB [facility]   4/30/2025  4:45 PM Bridger Avendano MD [facility]   5/2/2025  1:15 PM [facility] CARD PHASE 2 RM [facility] CP REHAB [facility]   5/5/2025  1:15 PM [facility] CARD PHASE 2 RM [facility] RE[facility]AB [facility]   5/7/2025  1:15 PM [facility] CARD PHASE 2 RM [facility] REHAB [facility]   5/9/2025  1:15 PM [facility] CARD PHASE 2 RM [facility] RE[facility]   5/12/2025  1:15 PM [facility] CARD PHASE 2 RM [facility] RE[facility]   5/14/2025  1:15 PM [facility] CARD PHASE 2 RM [facility] CP REHAB [facility]   5/16/2025  1:15 PM [facility] CARD PHASE 2 RM [facility]   5/19/2025  1:15 PM [facility] CARD PHASE 2 RM [facility] CP REHAB [facility]   5/19/2025  2:30 PM Bridger Avendano MD [facility]   5/21/2025  1:15 PM [facility] CARD PHASE 2 RM [facility] CP REHAB [facility]   5/23/2025  1:15 PM [facility] CARD PHASE 2 RM [facility] REHAB [facility]   5/28/2025  1:15 PM [facility] CARD PHASE 2 RM [facility] CP REHAB [facility]   5/30/2025  1:15 PM [facility] CARD PHASE 2 RM [facility] CP REHAB [facility]   6/2/2025  1:15 PM [facility] CARD PHASE 2 RM [facility] CP REHAB [facility]   6/4/2025  1:15 PM [facility] CARD PHASE 2 RM [facility] CP REHAB [facility]   6/6/2025  1:15 PM [facility] CARD PHASE 2 RM [facility] CP REHAB [facility]   6/9/2025  1:15 PM [facility] CARD PHASE 2 RM [facility] CP REHAB EM [facility]   7/24/2025  1:00 PM Hina Mcarthur MD [id number] [facility]         Reason for call: Sinusitis  Onset: Data Unavailable    Patient has seasonal sinus issues. She has post nasal drip and congestion. She denies pain or fever. The post nasal drip makes her nauseated.     Reason for Disposition   Sinus congestion (pressure, fullness) present > 10 days    Protocols used: Sinus Pain or Congestion-A-OH

## 2025-04-28 NOTE — TELEPHONE ENCOUNTER
Medications - Current[1]    pantoprazole 40 MG Oral Tab EC, Take 1 tablet (40 mg total) by mouth every morning before breakfast., Disp: 90 tablet, Rfl: 3        [1]   Current Outpatient Medications:     hydroCHLOROthiazide 12.5 MG Oral Tab, Take 1 tablet (12.5 mg total) by mouth daily., Disp: 30 tablet, Rfl: 2    hydrALAZINE 100 MG Oral Tab, Take 1 tablet (100 mg total) by mouth in the morning, at noon, and at bedtime., Disp: 90 tablet, Rfl: 2    clopidogrel 75 MG Oral Tab, Take 1 tablet (75 mg total) by mouth daily., Disp: 30 tablet, Rfl: 0    aspirin 81 MG Oral Tab EC, Take 1 tablet (81 mg total) by mouth daily., Disp: 30 tablet, Rfl: 0    acetaminophen 500 MG Oral Tab, Take 2 tablets (1,000 mg total) by mouth every 6 (six) hours as needed for Pain., Disp: , Rfl:     thiamine 100 MG Oral Tab, Take 1 tablet (100 mg total) by mouth daily., Disp: , Rfl:     ezetimibe 10 MG Oral Tab, Take 1 tablet (10 mg total) by mouth daily., Disp: , Rfl:     pantoprazole 40 MG Oral Tab EC, Take 1 tablet (40 mg total) by mouth every morning before breakfast., Disp: 90 tablet, Rfl: 3    bisacodyl 5 MG Oral Tab EC, Take 1 tablet (5 mg total) by mouth daily as needed., Disp: 30 tablet, Rfl: 0    docusate sodium (COLACE) 100 MG Oral Cap, Take 1 capsule (100 mg total) by mouth 2 (two) times daily as needed for constipation., Disp: 180 capsule, Rfl: 1    Ferrous Gluconate 324 (37.5 Fe) MG Oral Tab, Take 1 tablet (324 mg total) by mouth daily., Disp: 90 tablet, Rfl: 1    azelastine 0.1 % Nasal Solution, 1 spray by Nasal route 2 (two) times daily. (Patient taking differently: 1 spray by Nasal route daily as needed for Rhinitis.), Disp: 3 each, Rfl: 1    nystatin 879781 UNIT/GM External Powder, Apply 1 Application topically 3 (three) times daily., Disp: 60 g, Rfl: 2    mirtazapine 7.5 MG Oral Tab, 1 tablet (7.5 mg total) by Per G Tube route nightly. (Patient taking differently: 1 tablet (7.5 mg total) by Per G Tube route as needed.), Disp:  30 tablet, Rfl: 0    amLODIPine 10 MG Oral Tab, Take 1 tablet (10 mg total) by mouth daily., Disp: 90 tablet, Rfl: 3

## 2025-04-30 ENCOUNTER — OFFICE VISIT (OUTPATIENT)
Dept: FAMILY MEDICINE CLINIC | Facility: CLINIC | Age: 60
End: 2025-04-30

## 2025-04-30 VITALS
HEIGHT: 65 IN | TEMPERATURE: 98 F | BODY MASS INDEX: 34.99 KG/M2 | OXYGEN SATURATION: 100 % | SYSTOLIC BLOOD PRESSURE: 128 MMHG | DIASTOLIC BLOOD PRESSURE: 67 MMHG | HEART RATE: 93 BPM | WEIGHT: 210 LBS

## 2025-04-30 DIAGNOSIS — R29.898 LEFT ARM WEAKNESS: ICD-10-CM

## 2025-04-30 DIAGNOSIS — I71.010 DISSECTION OF ASCENDING AORTA (HCC): ICD-10-CM

## 2025-04-30 DIAGNOSIS — J01.90 ACUTE SINUSITIS, RECURRENCE NOT SPECIFIED, UNSPECIFIED LOCATION: ICD-10-CM

## 2025-04-30 DIAGNOSIS — R09.82 POST-NASAL DRIP: Primary | ICD-10-CM

## 2025-04-30 PROCEDURE — 99214 OFFICE O/P EST MOD 30 MIN: CPT | Performed by: FAMILY MEDICINE

## 2025-04-30 RX ORDER — SIMETHICONE 80 MG
80 TABLET,CHEWABLE ORAL 4 TIMES DAILY PRN
COMMUNITY
End: 2025-05-01

## 2025-04-30 RX ORDER — METOPROLOL SUCCINATE 25 MG/1
25 TABLET, EXTENDED RELEASE ORAL DAILY
Status: ON HOLD | COMMUNITY
Start: 2025-04-24

## 2025-04-30 RX ORDER — MELATONIN
Qty: 90 TABLET | Refills: 1 | Status: ON HOLD | OUTPATIENT
Start: 2025-04-30

## 2025-04-30 RX ORDER — AMOXICILLIN 875 MG/1
875 TABLET, COATED ORAL 2 TIMES DAILY
Qty: 20 TABLET | Refills: 0 | Status: ON HOLD | OUTPATIENT
Start: 2025-04-30 | End: 2025-05-10

## 2025-04-30 RX ORDER — LEVOCETIRIZINE DIHYDROCHLORIDE 5 MG/1
5 TABLET, FILM COATED ORAL EVERY EVENING
Qty: 30 TABLET | Refills: 1 | Status: ON HOLD | OUTPATIENT
Start: 2025-04-30

## 2025-04-30 RX ORDER — CLONIDINE HYDROCHLORIDE 0.1 MG/1
0.05 TABLET ORAL DAILY
Status: ON HOLD | COMMUNITY
Start: 2025-04-24

## 2025-04-30 RX ORDER — FUROSEMIDE 40 MG/1
40 TABLET ORAL DAILY
COMMUNITY
Start: 2025-04-24 | End: 2025-05-01

## 2025-04-30 NOTE — PROGRESS NOTES
HPI:    Patient ID: Alisha Wilde is a 59 year old female.      Nasal Congestion  Associated symptoms include congestion and weakness. Pertinent negatives include no abdominal pain, chest pain, chills, coughing, fever, headaches, nausea, numbness or vomiting.       Chief Complaint   Patient presents with    Nasal Congestion    Post Nasal Drip       Wt Readings from Last 6 Encounters:   04/30/25 210 lb (95.3 kg)   04/15/25 213 lb 6.5 oz (96.8 kg)   03/30/25 210 lb (95.3 kg)   03/20/25 230 lb (104.3 kg)   03/06/25 230 lb (104.3 kg)   02/03/25 232 lb (105.2 kg)     BP Readings from Last 3 Encounters:   04/30/25 128/67   04/15/25 141/74   03/30/25 111/68     Patient here for follow up runny nose, oost nasal drip  Has clogging sensation back of throat  Feels wont go down when she swallow/ drinks  Has sinus pressure     She had to go back to ER 3/30 for dissection of aorta  Got transferred to [facility].  Had surgery there  Aortic dissection ([facility])   ADL and mobility dysfunction due to Type B dissection s/p endovascular repair on 4/2, now s/p thoracic stent graft 4/7     Then admitted to [facility] to 4/15.  Discharged from [facility] 3 days ago.  Had apartment with vascular surgery yesterday    ASSESSMENT   Diagnoses and all orders for this visit:    Left arm weakness  - PT AND/OR OT EVAL & TREAT (DULY); Standing    Wound of right groin, initial encounter  - US LOWER EXT ARTERIAL R/O PSEUDO-VASC LAB (CPT=93926)    Dissection of ascending aorta (HCC)  - CTA CHEST+CTA ABD/PELVIS (CPT=71275/89258); Future  - UREA NITROGEN (BUN); Future  - CREATININE; Future    The patient is s/p thoracic stent graft with balloon angioplasty and stent of the right external iliac artery on 4/7 and a left carotid to subclavian transposition, endovascular arch repair, stenting of the left renal and iliac arteries with Dr. Holder on 4/2. Patient has continued decreased left upper extremity flexion. Patient does not have home health  physical therapy ordered. I have placed an order for outpatient PT. Patient also notes right groin drainage, serous/milky. I have obtained an ultrasound today. Imaging was negative for pseudoaneurysm or fistula. A small non-vasuclar hypoechoic strucure noted at the groin, measured appx 1.2 x 0.6 cm. Dr. Holder informed, patient to follow-up with MD tomorrow. Will schedule repeat CT scan in 6 months.    PLAN:     Repeat CTA chest, abdomen pelvis in 6 months.  Physical therapy ordered for left upper extremity weakness.  Patient to follow-up with Dr. Holder tomorrow regarding right groin access site drainage.      Has a procedure in few days   Feels left arm weakness  Out patient therapy has been ordered     Review of Systems   Constitutional:  Negative for chills and fever.   HENT:  Positive for congestion, postnasal drip and sinus pressure.    Eyes:  Negative for visual disturbance.   Respiratory:  Negative for cough, shortness of breath and wheezing.    Cardiovascular:  Negative for chest pain, palpitations and leg swelling.   Gastrointestinal:  Negative for abdominal pain, constipation, diarrhea, nausea and vomiting.   Genitourinary:  Negative for decreased urine volume and difficulty urinating.   Neurological:  Positive for weakness. Negative for dizziness, tremors, seizures, light-headedness, numbness and headaches.       /67   Pulse 93   Temp 97.7 °F (36.5 °C) (Temporal)   Ht 5' 5\" (1.651 m)   Wt 210 lb (95.3 kg)   SpO2 100%   BMI 34.95 kg/m²        Current Medications[1]  Allergies:Allergies[2]   PHYSICAL EXAM:     Chief Complaint   Patient presents with    Nasal Congestion    Post Nasal Drip      Physical Exam  Vitals reviewed.   Constitutional:       Appearance: She is obese.   HENT:      Right Ear: Tympanic membrane and ear canal normal.      Left Ear: Tympanic membrane and ear canal normal.      Nose: Congestion present.      Comments: C.o sinus pressure     Mouth/Throat:      Pharynx: No  oropharyngeal exudate or posterior oropharyngeal erythema.   Cardiovascular:      Rate and Rhythm: Normal rate and regular rhythm.      Pulses: Normal pulses.      Heart sounds: Normal heart sounds.   Pulmonary:      Effort: Pulmonary effort is normal.      Breath sounds: Normal breath sounds.   Abdominal:      Tenderness: There is no abdominal tenderness.   Musculoskeletal:      Cervical back: Normal range of motion and neck supple.   Neurological:      Mental Status: She is alert.                ASSESSMENT/PLAN:     Encounter Diagnoses   Name Primary?    Post-nasal drip Yes    Acute sinusitis, recurrence not specified, unspecified location     Dissection of ascending aorta (HCC)     Left arm weakness        1. Post-nasal drip  Add xyzal    - levocetirizine 5 MG Oral Tab; Take 1 tablet (5 mg total) by mouth every evening.  Dispense: 30 tablet; Refill: 1    2. Acute sinusitis, recurrence not specified, unspecified location  Take medications as directed. Side effects/ risks discussed and patient verbalized understanding of plan. To follow up if symptoms worsen.    - amoxicillin 875 MG Oral Tab; Take 1 tablet (875 mg total) by mouth 2 (two) times daily for 10 days.  Dispense: 20 tablet; Refill: 0    3. Dissection of ascending aorta (HCC)  Recent hospital/ tests reviewed    4. Left arm weakness  Follow up outpt Physical Therapy     Handicap placard completed 6 months       No orders of the defined types were placed in this encounter.        The above note was creating using Dragon speech recognition technology. Please excuse any typos    Meds This Visit:  Requested Prescriptions     Signed Prescriptions Disp Refills    levocetirizine 5 MG Oral Tab 30 tablet 1     Sig: Take 1 tablet (5 mg total) by mouth every evening.    amoxicillin 875 MG Oral Tab 20 tablet 0     Sig: Take 1 tablet (875 mg total) by mouth 2 (two) times daily for 10 days.       Imaging & Referrals:  None       ID#[id number]       [1]   Current Outpatient  Medications   Medication Sig Dispense Refill    cloNIDine 0.1 MG Oral Tab Take 1 tablet (0.1 mg total) by mouth daily.      furosemide 40 MG Oral Tab Take 1 tablet (40 mg total) by mouth daily.      metoprolol succinate ER 25 MG Oral Tablet 24 Hr Take 0.5 tablets (12.5 mg total) by mouth daily.      simethicone 80 MG Oral Chew Tab Chew 1 tablet (80 mg total) by mouth as needed in the morning and 1 tablet (80 mg total) as needed at noon and 1 tablet (80 mg total) as needed in the evening and 1 tablet (80 mg total) as needed before bedtime.      levocetirizine 5 MG Oral Tab Take 1 tablet (5 mg total) by mouth every evening. 30 tablet 1    amoxicillin 875 MG Oral Tab Take 1 tablet (875 mg total) by mouth 2 (two) times daily for 10 days. 20 tablet 0    hydroCHLOROthiazide 12.5 MG Oral Tab Take 1 tablet (12.5 mg total) by mouth daily. 30 tablet 2    hydrALAZINE 100 MG Oral Tab Take 1 tablet (100 mg total) by mouth in the morning, at noon, and at bedtime. 90 tablet 2    aspirin 81 MG Oral Tab EC Take 1 tablet (81 mg total) by mouth daily. 30 tablet 0    acetaminophen 500 MG Oral Tab Take 2 tablets (1,000 mg total) by mouth every 6 (six) hours as needed for Pain.      thiamine 100 MG Oral Tab Take 1 tablet (100 mg total) by mouth daily.      ezetimibe 10 MG Oral Tab Take 1 tablet (10 mg total) by mouth daily.      pantoprazole 40 MG Oral Tab EC Take 1 tablet (40 mg total) by mouth every morning before breakfast. 90 tablet 3    bisacodyl 5 MG Oral Tab EC Take 1 tablet (5 mg total) by mouth daily as needed. 30 tablet 0    docusate sodium (COLACE) 100 MG Oral Cap Take 1 capsule (100 mg total) by mouth 2 (two) times daily as needed for constipation. 180 capsule 1    Ferrous Gluconate 324 (37.5 Fe) MG Oral Tab Take 1 tablet (324 mg total) by mouth daily. 90 tablet 1    azelastine 0.1 % Nasal Solution 1 spray by Nasal route 2 (two) times daily. (Patient taking differently: 1 spray by Nasal route daily as needed for Rhinitis.) 3  each 1    nystatin [id number] UNIT/GM External Powder Apply 1 Application topically 3 (three) times daily. 60 g 2    mirtazapine 7.5 MG Oral Tab 1 tablet (7.5 mg total) by Per G Tube route nightly. (Patient taking differently: 1 tablet (7.5 mg total) by Per G Tube route as needed.) 30 tablet 0    amLODIPine 10 MG Oral Tab Take 1 tablet (10 mg total) by mouth daily. 90 tablet 3    clopidogrel 75 MG Oral Tab Take 1 tablet (75 mg total) by mouth daily. (Patient not taking: Reported on 4/30/2025) 30 tablet 0   [2]   Allergies  Allergen Reactions    Atorvastatin MYALGIA    Pravastatin MYALGIA    Rosuvastatin MYALGIA    Seasonal Runny nose

## 2025-04-30 NOTE — TELEPHONE ENCOUNTER
Please review; protocol failed/ has no protocol      Medication is listed as patient reported.

## 2025-05-01 ENCOUNTER — TELEPHONE (OUTPATIENT)
Dept: FAMILY MEDICINE CLINIC | Facility: CLINIC | Age: 60
End: 2025-05-01

## 2025-05-01 RX ORDER — ERGOCALCIFEROL 1.25 MG/1
50000 CAPSULE ORAL WEEKLY
COMMUNITY
End: 2025-05-02

## 2025-05-01 RX ORDER — CLOPIDOGREL BISULFATE 75 MG/1
75 TABLET ORAL DAILY
COMMUNITY

## 2025-05-01 RX ORDER — POTASSIUM CHLORIDE 1500 MG/1
20 TABLET, EXTENDED RELEASE ORAL 2 TIMES DAILY
COMMUNITY
End: 2025-05-02

## 2025-05-01 RX ORDER — PRAVASTATIN SODIUM 20 MG
20 TABLET ORAL NIGHTLY
COMMUNITY
End: 2025-05-02

## 2025-05-01 RX ORDER — SULFAMETHOXAZOLE AND TRIMETHOPRIM 800; 160 MG/1; MG/1
1 TABLET ORAL 2 TIMES DAILY
COMMUNITY
End: 2025-05-10

## 2025-05-01 NOTE — TELEPHONE ENCOUNTER
Outpatient Medication Detail     Disp Refills Start End    pantoprazole 40 MG Oral Tab EC 90 tablet 3 3/6/2025 --    Sig - Route: Take 1 tablet (40 mg total) by mouth every morning before breakfast. - Oral    Sent to pharmacy as: Pantoprazole Sodium 40 MG Oral Tablet Delayed Release (Protonix)    E-Prescribing Status: Receipt confirmed by pharmacy (3/6/2025 12:20 PM CST)      Associated Diagnoses    Gastroesophageal reflux disease, unspecified whether esophagitis present        Pharmacy    [id number] - [place], [place] - [address] [phone], [phone]

## 2025-05-01 NOTE — TELEPHONE ENCOUNTER
Called patient and informed of email received from [employer] - informed patient there were no forms attached with email. Patient stated she will contact [employer] and follow up.

## 2025-05-01 NOTE — TELEPHONE ENCOUNTER
Medications - Current[1]    pantoprazole 40 MG Oral Tab EC, Take 1 tablet (40 mg total) by mouth every morning before breakfast., Disp: 90 tablet, Rfl: 3        [1]   Current Outpatient Medications:     thiamine 100 MG Oral Tab, TAKE 1 TABLET DAILY WITH FEEDING TUBE, Disp: 90 tablet, Rfl: 1    cloNIDine 0.1 MG Oral Tab, Take 1 tablet (0.1 mg total) by mouth daily., Disp: , Rfl:     furosemide 40 MG Oral Tab, Take 1 tablet (40 mg total) by mouth daily., Disp: , Rfl:     metoprolol succinate ER 25 MG Oral Tablet 24 Hr, Take 0.5 tablets (12.5 mg total) by mouth daily., Disp: , Rfl:     simethicone 80 MG Oral Chew Tab, Chew 1 tablet (80 mg total) by mouth as needed in the morning and 1 tablet (80 mg total) as needed at noon and 1 tablet (80 mg total) as needed in the evening and 1 tablet (80 mg total) as needed before bedtime., Disp: , Rfl:     levocetirizine 5 MG Oral Tab, Take 1 tablet (5 mg total) by mouth every evening., Disp: 30 tablet, Rfl: 1    amoxicillin 875 MG Oral Tab, Take 1 tablet (875 mg total) by mouth 2 (two) times daily for 10 days., Disp: 20 tablet, Rfl: 0    hydroCHLOROthiazide 12.5 MG Oral Tab, Take 1 tablet (12.5 mg total) by mouth daily., Disp: 30 tablet, Rfl: 2    hydrALAZINE 100 MG Oral Tab, Take 1 tablet (100 mg total) by mouth in the morning, at noon, and at bedtime., Disp: 90 tablet, Rfl: 2    clopidogrel 75 MG Oral Tab, Take 1 tablet (75 mg total) by mouth daily. (Patient not taking: Reported on 4/30/2025), Disp: 30 tablet, Rfl: 0    aspirin 81 MG Oral Tab EC, Take 1 tablet (81 mg total) by mouth daily., Disp: 30 tablet, Rfl: 0    acetaminophen 500 MG Oral Tab, Take 2 tablets (1,000 mg total) by mouth every 6 (six) hours as needed for Pain., Disp: , Rfl:     ezetimibe 10 MG Oral Tab, Take 1 tablet (10 mg total) by mouth daily., Disp: , Rfl:     pantoprazole 40 MG Oral Tab EC, Take 1 tablet (40 mg total) by mouth every morning before breakfast., Disp: 90 tablet, Rfl: 3    bisacodyl 5 MG Oral  Tab EC, Take 1 tablet (5 mg total) by mouth daily as needed., Disp: 30 tablet, Rfl: 0    docusate sodium (COLACE) 100 MG Oral Cap, Take 1 capsule (100 mg total) by mouth 2 (two) times daily as needed for constipation., Disp: 180 capsule, Rfl: 1    Ferrous Gluconate 324 (37.5 Fe) MG Oral Tab, Take 1 tablet (324 mg total) by mouth daily., Disp: 90 tablet, Rfl: 1    azelastine 0.1 % Nasal Solution, 1 spray by Nasal route 2 (two) times daily. (Patient taking differently: 1 spray by Nasal route daily as needed for Rhinitis.), Disp: 3 each, Rfl: 1    nystatin [id number] UNIT/GM External Powder, Apply 1 Application topically 3 (three) times daily., Disp: 60 g, Rfl: 2    mirtazapine 7.5 MG Oral Tab, 1 tablet (7.5 mg total) by Per G Tube route nightly. (Patient taking differently: 1 tablet (7.5 mg total) by Per G Tube route as needed.), Disp: 30 tablet, Rfl: 0    amLODIPine 10 MG Oral Tab, Take 1 tablet (10 mg total) by mouth daily., Disp: 90 tablet, Rfl: 3

## 2025-05-01 NOTE — TELEPHONE ENCOUNTER
Patient called to check if Protected health information was received , advised patient unable to locate letter. Suggested patient have papers sent to her via email and she soul forward it directly to the forms department. Provided forms department email addressed and advised patient to follow up with the forms department after email is sent.

## 2025-05-01 NOTE — TELEPHONE ENCOUNTER
Patient called to ask why we can't send office notes from the email we received requesting it. I advised her that is has to be a form on there letter head requesting the Protected health information. I told her this is for her protection. This is just an email typed out asking for the records with no forms attached. Patient verbally understood and will contact [place].

## 2025-05-02 RX ORDER — FUROSEMIDE 40 MG/1
40 TABLET ORAL DAILY
COMMUNITY

## 2025-05-05 ENCOUNTER — ANESTHESIA (OUTPATIENT)
Dept: CARDIAC SURGERY | Facility: HOSPITAL | Age: 60
End: 2025-05-05
Payer: COMMERCIAL

## 2025-05-05 ENCOUNTER — ANESTHESIA EVENT (OUTPATIENT)
Dept: CARDIAC SURGERY | Facility: HOSPITAL | Age: 60
End: 2025-05-05
Payer: COMMERCIAL

## 2025-05-05 ENCOUNTER — APPOINTMENT (OUTPATIENT)
Dept: CARDIAC REHAB | Facility: HOSPITAL | Age: 60
End: 2025-05-05
Attending: INTERNAL MEDICINE
Payer: COMMERCIAL

## 2025-05-05 ENCOUNTER — HOSPITAL ENCOUNTER (INPATIENT)
Facility: HOSPITAL | Age: 60
LOS: 5 days | Discharge: HOME HEALTH | End: 2025-05-10
Attending: SURGERY | Admitting: SURGERY
Payer: COMMERCIAL

## 2025-05-05 PROBLEM — L02.214 ABSCESS OF LEFT GROIN: Status: ACTIVE | Noted: 2025-05-05

## 2025-05-05 PROCEDURE — 0JBC0ZZ EXCISION OF PELVIC REGION SUBCUTANEOUS TISSUE AND FASCIA, OPEN APPROACH: ICD-10-PCS | Performed by: SURGERY

## 2025-05-05 PROCEDURE — 0J9C0ZZ DRAINAGE OF PELVIC REGION SUBCUTANEOUS TISSUE AND FASCIA, OPEN APPROACH: ICD-10-PCS | Performed by: SURGERY

## 2025-05-05 PROCEDURE — 99221 1ST HOSP IP/OBS SF/LOW 40: CPT | Performed by: HOSPITALIST

## 2025-05-05 RX ORDER — PROCHLORPERAZINE EDISYLATE 5 MG/ML
5 INJECTION INTRAMUSCULAR; INTRAVENOUS EVERY 8 HOURS PRN
Status: DISCONTINUED | OUTPATIENT
Start: 2025-05-05 | End: 2025-05-05 | Stop reason: HOSPADM

## 2025-05-05 RX ORDER — MEPERIDINE HYDROCHLORIDE 25 MG/ML
12.5 INJECTION INTRAMUSCULAR; INTRAVENOUS; SUBCUTANEOUS AS NEEDED
Status: DISCONTINUED | OUTPATIENT
Start: 2025-05-05 | End: 2025-05-05 | Stop reason: HOSPADM

## 2025-05-05 RX ORDER — NICOTINE POLACRILEX 4 MG
30 LOZENGE BUCCAL
Status: DISCONTINUED | OUTPATIENT
Start: 2025-05-05 | End: 2025-05-05 | Stop reason: HOSPADM

## 2025-05-05 RX ORDER — HYDROCODONE BITARTRATE AND ACETAMINOPHEN 5; 325 MG/1; MG/1
2 TABLET ORAL ONCE AS NEEDED
Status: DISCONTINUED | OUTPATIENT
Start: 2025-05-05 | End: 2025-05-05 | Stop reason: HOSPADM

## 2025-05-05 RX ORDER — PANTOPRAZOLE SODIUM 40 MG/1
40 TABLET, DELAYED RELEASE ORAL
Status: DISCONTINUED | OUTPATIENT
Start: 2025-05-05 | End: 2025-05-10

## 2025-05-05 RX ORDER — SODIUM CHLORIDE 9 MG/ML
INJECTION, SOLUTION INTRAVENOUS CONTINUOUS
Status: DISCONTINUED | OUTPATIENT
Start: 2025-05-05 | End: 2025-05-05 | Stop reason: HOSPADM

## 2025-05-05 RX ORDER — MIRTAZAPINE 7.5 MG/1
7.5 TABLET, FILM COATED ORAL NIGHTLY PRN
Status: DISCONTINUED | OUTPATIENT
Start: 2025-05-05 | End: 2025-05-10

## 2025-05-05 RX ORDER — ASPIRIN 81 MG/1
81 TABLET ORAL DAILY
Status: DISCONTINUED | OUTPATIENT
Start: 2025-05-05 | End: 2025-05-10

## 2025-05-05 RX ORDER — CLOPIDOGREL BISULFATE 75 MG/1
75 TABLET ORAL DAILY
Status: DISCONTINUED | OUTPATIENT
Start: 2025-05-05 | End: 2025-05-10

## 2025-05-05 RX ORDER — LIDOCAINE HYDROCHLORIDE 10 MG/ML
INJECTION, SOLUTION EPIDURAL; INFILTRATION; INTRACAUDAL; PERINEURAL AS NEEDED
Status: DISCONTINUED | OUTPATIENT
Start: 2025-05-05 | End: 2025-05-05 | Stop reason: SURG

## 2025-05-05 RX ORDER — PROCHLORPERAZINE EDISYLATE 5 MG/ML
5 INJECTION INTRAMUSCULAR; INTRAVENOUS EVERY 8 HOURS PRN
Status: DISCONTINUED | OUTPATIENT
Start: 2025-05-05 | End: 2025-05-10

## 2025-05-05 RX ORDER — ONDANSETRON 2 MG/ML
4 INJECTION INTRAMUSCULAR; INTRAVENOUS EVERY 6 HOURS PRN
Status: DISCONTINUED | OUTPATIENT
Start: 2025-05-05 | End: 2025-05-10

## 2025-05-05 RX ORDER — HYDROCHLOROTHIAZIDE 12.5 MG/1
12.5 TABLET ORAL DAILY
Status: DISCONTINUED | OUTPATIENT
Start: 2025-05-06 | End: 2025-05-10

## 2025-05-05 RX ORDER — METOPROLOL SUCCINATE 25 MG/1
25 TABLET, EXTENDED RELEASE ORAL
Status: DISCONTINUED | OUTPATIENT
Start: 2025-05-06 | End: 2025-05-06

## 2025-05-05 RX ORDER — HYDROMORPHONE HYDROCHLORIDE 1 MG/ML
0.2 INJECTION, SOLUTION INTRAMUSCULAR; INTRAVENOUS; SUBCUTANEOUS EVERY 5 MIN PRN
Status: DISCONTINUED | OUTPATIENT
Start: 2025-05-05 | End: 2025-05-05 | Stop reason: HOSPADM

## 2025-05-05 RX ORDER — HYDRALAZINE HYDROCHLORIDE 50 MG/1
100 TABLET, FILM COATED ORAL 3 TIMES DAILY
Status: DISCONTINUED | OUTPATIENT
Start: 2025-05-05 | End: 2025-05-10

## 2025-05-05 RX ORDER — HYDROCODONE BITARTRATE AND ACETAMINOPHEN 5; 325 MG/1; MG/1
1 TABLET ORAL ONCE AS NEEDED
Status: DISCONTINUED | OUTPATIENT
Start: 2025-05-05 | End: 2025-05-05 | Stop reason: HOSPADM

## 2025-05-05 RX ORDER — DOCUSATE SODIUM 100 MG/1
100 CAPSULE, LIQUID FILLED ORAL 2 TIMES DAILY PRN
Status: DISCONTINUED | OUTPATIENT
Start: 2025-05-05 | End: 2025-05-10

## 2025-05-05 RX ORDER — ECHINACEA PURPUREA EXTRACT 125 MG
1 TABLET ORAL
Status: DISCONTINUED | OUTPATIENT
Start: 2025-05-05 | End: 2025-05-10

## 2025-05-05 RX ORDER — HYDROMORPHONE HYDROCHLORIDE 1 MG/ML
0.6 INJECTION, SOLUTION INTRAMUSCULAR; INTRAVENOUS; SUBCUTANEOUS EVERY 5 MIN PRN
Status: DISCONTINUED | OUTPATIENT
Start: 2025-05-05 | End: 2025-05-05 | Stop reason: HOSPADM

## 2025-05-05 RX ORDER — MELATONIN
100 DAILY
Status: DISCONTINUED | OUTPATIENT
Start: 2025-05-06 | End: 2025-05-10

## 2025-05-05 RX ORDER — METOPROLOL TARTRATE 1 MG/ML
2.5 INJECTION, SOLUTION INTRAVENOUS ONCE
Status: DISCONTINUED | OUTPATIENT
Start: 2025-05-05 | End: 2025-05-05 | Stop reason: HOSPADM

## 2025-05-05 RX ORDER — FERROUS SULFATE 325(65) MG
325 TABLET, DELAYED RELEASE (ENTERIC COATED) ORAL
Status: DISCONTINUED | OUTPATIENT
Start: 2025-05-06 | End: 2025-05-10

## 2025-05-05 RX ORDER — NICOTINE POLACRILEX 4 MG
15 LOZENGE BUCCAL
Status: DISCONTINUED | OUTPATIENT
Start: 2025-05-05 | End: 2025-05-05 | Stop reason: HOSPADM

## 2025-05-05 RX ORDER — HYDROMORPHONE HYDROCHLORIDE 1 MG/ML
0.4 INJECTION, SOLUTION INTRAMUSCULAR; INTRAVENOUS; SUBCUTANEOUS EVERY 5 MIN PRN
Status: DISCONTINUED | OUTPATIENT
Start: 2025-05-05 | End: 2025-05-05 | Stop reason: HOSPADM

## 2025-05-05 RX ORDER — CETIRIZINE HYDROCHLORIDE 5 MG/1
5 TABLET ORAL NIGHTLY
Status: DISCONTINUED | OUTPATIENT
Start: 2025-05-05 | End: 2025-05-10

## 2025-05-05 RX ORDER — MIDAZOLAM HYDROCHLORIDE 1 MG/ML
1 INJECTION INTRAMUSCULAR; INTRAVENOUS EVERY 5 MIN PRN
Status: DISCONTINUED | OUTPATIENT
Start: 2025-05-05 | End: 2025-05-05 | Stop reason: HOSPADM

## 2025-05-05 RX ORDER — POLYETHYLENE GLYCOL 3350 17 G/17G
17 POWDER, FOR SOLUTION ORAL DAILY PRN
Status: DISCONTINUED | OUTPATIENT
Start: 2025-05-05 | End: 2025-05-08

## 2025-05-05 RX ORDER — FUROSEMIDE 40 MG/1
40 TABLET ORAL DAILY
Status: DISCONTINUED | OUTPATIENT
Start: 2025-05-05 | End: 2025-05-10

## 2025-05-05 RX ORDER — ONDANSETRON 2 MG/ML
4 INJECTION INTRAMUSCULAR; INTRAVENOUS EVERY 6 HOURS PRN
Status: DISCONTINUED | OUTPATIENT
Start: 2025-05-05 | End: 2025-05-05 | Stop reason: HOSPADM

## 2025-05-05 RX ORDER — DEXTROSE MONOHYDRATE 25 G/50ML
50 INJECTION, SOLUTION INTRAVENOUS
Status: DISCONTINUED | OUTPATIENT
Start: 2025-05-05 | End: 2025-05-05 | Stop reason: HOSPADM

## 2025-05-05 RX ORDER — AMLODIPINE BESYLATE 10 MG/1
10 TABLET ORAL DAILY
Status: DISCONTINUED | OUTPATIENT
Start: 2025-05-05 | End: 2025-05-10

## 2025-05-05 RX ORDER — LABETALOL HYDROCHLORIDE 5 MG/ML
5 INJECTION, SOLUTION INTRAVENOUS EVERY 5 MIN PRN
Status: DISCONTINUED | OUTPATIENT
Start: 2025-05-05 | End: 2025-05-05 | Stop reason: HOSPADM

## 2025-05-05 RX ORDER — NALOXONE HYDROCHLORIDE 0.4 MG/ML
80 INJECTION, SOLUTION INTRAMUSCULAR; INTRAVENOUS; SUBCUTANEOUS AS NEEDED
Status: DISCONTINUED | OUTPATIENT
Start: 2025-05-05 | End: 2025-05-05 | Stop reason: HOSPADM

## 2025-05-05 RX ORDER — BUPIVACAINE HYDROCHLORIDE 2.5 MG/ML
INJECTION, SOLUTION EPIDURAL; INFILTRATION; INTRACAUDAL; PERINEURAL AS NEEDED
Status: DISCONTINUED | OUTPATIENT
Start: 2025-05-05 | End: 2025-05-05 | Stop reason: HOSPADM

## 2025-05-05 RX ORDER — INSULIN ASPART 100 [IU]/ML
INJECTION, SOLUTION INTRAVENOUS; SUBCUTANEOUS ONCE
Status: DISCONTINUED | OUTPATIENT
Start: 2025-05-05 | End: 2025-05-05 | Stop reason: HOSPADM

## 2025-05-05 RX ORDER — HYDROCODONE BITARTRATE AND ACETAMINOPHEN 5; 325 MG/1; MG/1
2 TABLET ORAL EVERY 4 HOURS PRN
Status: DISCONTINUED | OUTPATIENT
Start: 2025-05-05 | End: 2025-05-10

## 2025-05-05 RX ORDER — PHENYLEPHRINE HCL 10 MG/ML
VIAL (ML) INJECTION AS NEEDED
Status: DISCONTINUED | OUTPATIENT
Start: 2025-05-05 | End: 2025-05-05 | Stop reason: SURG

## 2025-05-05 RX ORDER — ACETAMINOPHEN 500 MG
1000 TABLET ORAL ONCE AS NEEDED
Status: DISCONTINUED | OUTPATIENT
Start: 2025-05-05 | End: 2025-05-05 | Stop reason: HOSPADM

## 2025-05-05 RX ORDER — HYDROCODONE BITARTRATE AND ACETAMINOPHEN 5; 325 MG/1; MG/1
1 TABLET ORAL EVERY 4 HOURS PRN
Status: DISCONTINUED | OUTPATIENT
Start: 2025-05-05 | End: 2025-05-10

## 2025-05-05 RX ORDER — ACETAMINOPHEN 325 MG/1
650 TABLET ORAL EVERY 4 HOURS PRN
Status: DISCONTINUED | OUTPATIENT
Start: 2025-05-05 | End: 2025-05-10

## 2025-05-05 RX ORDER — EZETIMIBE 10 MG/1
10 TABLET ORAL DAILY
Status: DISCONTINUED | OUTPATIENT
Start: 2025-05-05 | End: 2025-05-10

## 2025-05-05 RX ADMIN — PHENYLEPHRINE HCL 100 MCG: 10 MG/ML VIAL (ML) INJECTION at 06:57:00

## 2025-05-05 RX ADMIN — LIDOCAINE HYDROCHLORIDE 50 MG: 10 INJECTION, SOLUTION EPIDURAL; INFILTRATION; INTRACAUDAL; PERINEURAL at 06:41:00

## 2025-05-05 RX ADMIN — PHENYLEPHRINE HCL 200 MCG: 10 MG/ML VIAL (ML) INJECTION at 07:05:00

## 2025-05-05 NOTE — ANESTHESIA PROCEDURE NOTES
Airway  Date/Time: 5/5/2025 6:43 AM  Reason: elective      General Information and Staff   Patient location during procedure: OR  Anesthesiologist: Andre Truong MD  Performed: anesthesiologist   Performed by: Andre Truong MD  Authorized by: Andre Truong MD        Indications and Patient Condition  Indications for airway management: anesthesia  Sedation level: deep      Preoxygenated: yesPatient position: sniffing    Mask difficulty assessment: 1 - vent by mask    Final Airway Details    Final airway type: supraglottic airway      Successful airway: classic  Size: 3     Number of attempts at approach: 1

## 2025-05-05 NOTE — ANESTHESIA POSTPROCEDURE EVALUATION
[facility]    Alisha Wilde Patient Status:  Inpatient   Age/Gender 59 year old female MRN [id number]   Location [facility] POST ANESTHESIA CARE UNIT Attending Frank Holder MD   Hosp Day # 0 PCP Bridger Avendano MD       Anesthesia Post-op Note    RIGHT GROIN INCISION AND DRAINAGE    Procedure Summary       Date: 05/05/25 Room / Location:  [facility] 01 /  [facility]    Anesthesia Start: 0637 Anesthesia Stop: 0743    Procedure: RIGHT GROIN INCISION AND DRAINAGE (Right) Diagnosis: (WOUND OF RIGHT GROIN, INITIAL ENCOUNTER)    Surgeons: Frank Holder MD Anesthesiologist: Andre Truong MD    Anesthesia Type: general ASA Status: 3            Anesthesia Type: general    Vitals Value Taken Time   /59 05/05/25 07:50   Temp 97.4 °F (36.3 °C) 05/05/25 07:30   Pulse 73 05/05/25 07:57   Resp 17 05/05/25 07:57   SpO2 99 % 05/05/25 07:57   Vitals shown include unfiled device data.        Patient Location: PACU    Anesthesia Type: general    Airway Patency: patent    Postop Pain Control: adequate    Mental Status: mildly sedated but able to meaningfully participate in the post-anesthesia evaluation    Nausea/Vomiting: none    Cardiopulmonary/Hydration status: stable euvolemic    Complications: no apparent anesthesia related complications    Postop vital signs: stable    Dental Exam: Unchanged from Preop    Patient to be discharged from PACU when criteria met.

## 2025-05-05 NOTE — PLAN OF CARE
NURSING NOTES:  0915: Pt received AOx4 from PACU. Room air. IVF at 100 ml/hr. R groin with small clots. Wound vac not working-with blockage alarm.   1030: Norco given for pain. Dr. Holder came and removed wound vac. Wound consult to replace the wound vac. Pt tolerating liquids- advance diet to soft. SCD. Skin check performed with Milana as per unit policy. Assessment completed    Problem: SKIN/TISSUE INTEGRITY - ADULT  Goal: Incision(s), wounds(s) or drain site(s) healing without S/S of infection  Description: INTERVENTIONS:- Assess and document risk factors for pressure ulcer development- Assess and document skin integrity- Assess and document dressing/incision, wound bed, drain sites and surrounding tissue- Implement wound care per orders- Initiate isolation precautions as appropriate- Initiate Pressure Ulcer prevention bundle as indicated  Outcome: Progressing     Problem: PAIN - ADULT  Goal: Verbalizes/displays adequate comfort level or patient's stated pain goal  Description: INTERVENTIONS:- Encourage pt to monitor pain and request assistance- Assess pain using appropriate pain scale- Administer analgesics based on type and severity of pain and evaluate response- Implement non-pharmacological measures as appropriate and evaluate response- Consider cultural and social influences on pain and pain management- Manage/alleviate anxiety- Utilize distraction and/or relaxation techniques- Monitor for opioid side effects- Notify MD/LIP if interventions unsuccessful or patient reports new pain- Anticipate increased pain with activity and pre-medicate as appropriate  Outcome: Progressing

## 2025-05-05 NOTE — CONSULTS
[place] HOSPITALIST  CONSULT     Alisha Wilde Patient Status:  Inpatient    10/25/1965 MRN [id number]   Location [facility] 2NE-A Attending Frank Holder MD   Hosp Day # 0 PCP Bridger Avendano MD     Reason for consult: Medical management     Requested by: Dr. Holder     Subjective:   History of Present Illness:     Alisha Wilde is a 59 year old female with a PMH of HTN, HLD, CKD who presents for a surgical procedure.    Patient is doing well, pain is controlled post op.  She denies n/v.  No fever, chills, no cp or sob.  No other acute complaints.      History/Other:    Past Medical History:  Past Medical History[1]  Past Surgical History:   Past Surgical History[2]   Family History:   Family History[3]  Social History:    reports that she quit smoking about 26 years ago. Her smoking use included cigarettes. She started smoking about 39 years ago. She has a 13 pack-year smoking history. She has quit using smokeless tobacco. She reports that she does not currently use alcohol after a past usage of about 1.0 - 2.0 standard drink of alcohol per week. She reports that she does not use drugs.     Allergies: Allergies[4]    Medications:  Medications Ordered Prior to Encounter[5]    Review of Systems:   A comprehensive review of systems was completed.    Pertinent positives and negatives noted in the HPI.    Objective:   Physical Exam:    /70 (BP Location: Left arm)   Pulse 83   Temp 98.5 °F (36.9 °C) (Oral)   Resp 20   Ht 5' 5\" (1.651 m)   Wt 211 lb 6.7 oz (95.9 kg)   SpO2 100%   BMI 35.18 kg/m²   General: No acute distress, Alert  Respiratory: No rhonchi, no wheezes  Cardiovascular: S1, S2. Regular rate and rhythm  Abdomen: Soft, NT/ND, +BS  Neuro: No new focal deficits  Extremities: No edema    Results:    Labs:      Labs Last 24 Hours:  No results for input(s): \"WBC\", \"HGB\", \"MCV\", \"PLT\", \"BAND\", \"INR\" in the last 168 hours.    Invalid input(s): \"LYM#\", \"MONO#\", \"BASOS#\", \"EOSIN#\"    No results  for input(s): \"GLU\", \"BUN\", \"CREATSERUM\", \"GFRAA\", \"GFRNAA\", \"CA\", \"ALB\", \"NA\", \"K\", \"CL\", \"CO2\", \"ALKPHO\", \"AST\", \"ALT\", \"BILT\", \"TP\" in the last 168 hours.    No results for input(s): \"PTP\", \"INR\" in the last 168 hours.    No results for input(s): \"TROP\", \"CK\" in the last 168 hours.      Imaging: Imaging data reviewed in Epic.    Assessment & Plan:      #Right Groin superficial abscess  S/p I&D with Dr. Holder  Currently on Cefazolin post op- continue for now x2 doses  F/u am labs  F/u OR cultures  SCD for dvt proph    #Hx aortic arch dissection with aneurysm  #Prior Type A aortic dissection   #Hx left renal artery dissection   #Essential HTN  ASA, Plavix   Amlodipine, Clonidine, Lasix, Hydralazine, hydrochlorothiazide, metoprolol    #CKD 3A  Monitor BMP    #TANYA    #GERD  PPI    #HLD  Zetia         Plan of care discussed with patient, nurse     Angel Segal MD  2025    The  [employer] Cures Act makes medical notes like these available to patients in the interest of transparency. Please be advised this is a medical document. Medical documents are intended to carry relevant information, facts as evident, and the clinical opinion of the practitioner. The medical note is intended as peer to peer communication and may appear blunt or direct. It is written in medical language and may contain abbreviations or verbiage that are unfamiliar.            [1]   Past Medical History:   Arthritis    Chronic kidney disease (CKD)    Esophageal reflux    High blood pressure    High cholesterol    Hyperlipidemia    HYPERTENSION    Hypertension    Unspecified essential hypertension   [2]   Past Surgical History:  Procedure Laterality Date    Anesth,open heart surgery  2024    Colonoscopy N/A 2018    Procedure: COLONOSCOPY;  Surgeon: Magdy Webber MD;  Location: [facility] ENDOSCOPY    Colonoscopy  2018    Endometrial ablation      child passed away for 5 mins          1    Other surgical history   2011    cysto-Dr. Lacey -- pt denies   [3]   Family History  Problem Relation Age of Onset    Hypertension Mother     Heart Disorder Mother 70    Other (Other) Mother         kidney failure    Hypertension Father     Hypertension Maternal Grandfather     Cancer Neg     Diabetes Neg     Glaucoma Neg     Macular degeneration Neg    [4]   Allergies  Allergen Reactions    Atorvastatin MYALGIA    Pravastatin MYALGIA    Rosuvastatin MYALGIA    Seasonal Runny nose   [5]   Current Facility-Administered Medications on File Prior to Encounter   Medication Dose Route Frequency Provider Last Rate Last Admin    [COMPLETED] hydrALAZINE (Apresoline) tab 25 mg  25 mg Oral Once Carson Curran APRN   25 mg at 25 1125    [COMPLETED] hydrALAzine (Apresoline) 20 mg/mL injection 10 mg  10 mg Intravenous Once Vikki Kwan APRN   10 mg at 25    [COMPLETED] potassium chloride (Klor-Con M20) tab 40 mEq  40 mEq Oral Once Danette Atwood DO   40 mEq at 04/10/25 0829    [COMPLETED] potassium phosphate dibasic 15 mmol in sodium chloride 0.9% 250 mL IVPB  15 mmol Intravenous Once Bunny Atwood MD 62.5 mL/hr at 25 0641 15 mmol at 25 0641    Followed by    [COMPLETED] potassium chloride 20 mEq/100mL IVPB premix 20 mEq  20 mEq Intravenous Once Bunny Atwood MD 50 mL/hr at 25 1009 20 mEq at 25 1009    [COMPLETED] piperacillin-tazobactam (Zosyn) 3.375 g in dextrose 5% 100 mL IVPB-ADDV  3.375 g Intravenous Q8H Frank Holder MD 25 mL/hr at 25 1808 3.375 g at 25 1808    [COMPLETED] potassium chloride 20 mEq/100mL IVPB premix 20 mEq  20 mEq Intravenous Once Jade Cameron MD 50 mL/hr at 25 1042 20 mEq at 25 1042    [COMPLETED] sodium chloride 0.9% infusion   Intravenous Once Woodrow Boswell MD 10 mL/hr at 25 1606 New Bag at 25 1606    [] ondansetron (Zofran) 4 MG/2ML injection 4 mg  4 mg Intravenous PRN Anshul Olvera MD         [] metoclopramide (Reglan) 5 mg/mL injection 5 mg  5 mg Intravenous PRN Anshul Olvera MD        [] dexamethasone (Decadron) 4 MG/ML injection 4 mg  4 mg Intravenous PRN Anshul Olvera MD        [] trimethobenzamide (Tigan) 100 mg/mL injection 200 mg  200 mg Intramuscular PRN Anshul Olvera MD        [] HYDROmorphone (Dilaudid) 1 MG/ML injection 0.4 mg  0.4 mg Intravenous Q5 Min PRN Anshul Olvera MD        [] HYDROcodone-acetaminophen (Norco) 5-325 MG per tab 1 tablet  1 tablet Oral Once PRN Anshul Olvera MD        Or    [] HYDROcodone-acetaminophen (Norco) 5-325 MG per tab 2 tablet  2 tablet Oral Once PRN Anshul Olvera MD        [] atropine 0.1 MG/ML injection 0.5 mg  0.5 mg Intravenous PRN Anshul Olvera MD        [] naloxone (Narcan) 0.4 MG/ML injection 0.08 mg  0.08 mg Intravenous PRN Anshul Olvera MD        [] diphenhydrAMINE (Benadryl) 50 mg/mL  injection 12.5 mg  12.5 mg Intravenous PRN Anshul Olvera MD        [COMPLETED] potassium phosphate dibasic 15 mmol in sodium chloride 0.9% 250 mL IVPB  15 mmol Intravenous Once Stefany Mccall MD 62.5 mL/hr at 25 15 mmol at 25    [COMPLETED] potassium chloride 40 mEq in 250mL sodium chloride 0.9% IVPB premix  40 mEq Intravenous Once Tia Parker MD 62.5 mL/hr at 25 0507 40 mEq at 25 0507    [COMPLETED] potassium chloride 40 mEq in 250mL sodium chloride 0.9% IVPB premix  40 mEq Intravenous Once Jade Cameron MD 62.5 mL/hr at 25 1004 40 mEq at 25 1004    [COMPLETED] iopamidol 76% (ISOVUE-370) injection for power injector  100 mL Intravenous ONCE PRN Jade Cameron MD   100 mL at 25 1300    [COMPLETED] heparin (Porcine) 1000 UNIT/ML injection - BOLUS IV 5,000 Units  5,000 Units Intravenous Once Sb Campbell MD   5,000 Units at 25 1450     [COMPLETED] heparin (Porcine) 66636 units/250 mL infusion (ACS/AFIB) INITIAL DOSE  1,000 Units/hr Intravenous Once Sb Campbell MD 10 mL/hr at 25 1453 1,000 Units/hr at 25 1453    [COMPLETED] potassium chloride 20 mEq/100mL IVPB premix 20 mEq  20 mEq Intravenous Once Bunny Atwood MD 50 mL/hr at 25 0506 20 mEq at 25 0506    [COMPLETED] bisacodyl (Dulcolax) 10 MG rectal suppository 10 mg  10 mg Rectal Once Frank Holder MD   10 mg at 25 1103    [COMPLETED] furosemide (Lasix) 10 mg/mL injection 40 mg  40 mg Intravenous Once Bunny Atwood MD   40 mg at 25 0055    [COMPLETED] verapamil (Isoptin) 2.5 mg/mL injection             [COMPLETED] Nitroglycerin in D5W 200-5 MCG/ML-% injection             [] lactated ringers IV bolus 500 mL  500 mL Intravenous Once PRN Tanner Coffey MD        [] atropine 0.1 MG/ML injection 0.5 mg  0.5 mg Intravenous PRN Tanner Coffey MD        [] naloxone (Narcan) 0.4 MG/ML injection 0.08 mg  0.08 mg Intravenous PRN Tanner Coffey MD        [] fentaNYL (Sublimaze) 50 mcg/mL injection 25 mcg  25 mcg Intravenous Q5 Min PRN Tanner Coffey MD   25 mcg at 258    Or    [] fentaNYL (Sublimaze) 50 mcg/mL injection 50 mcg  50 mcg Intravenous Q5 Min PRN Tanner Coffey MD   50 mcg at 25 2249    [] HYDROmorphone (Dilaudid) 1 MG/ML injection 0.2 mg  0.2 mg Intravenous Q5 Min PRN Tanner Coffey MD        Or    [] HYDROmorphone (Dilaudid) 1 MG/ML injection 0.4 mg  0.4 mg Intravenous Q5 Min PRN Tanner Coffey MD   0.4 mg at 25 0319    Or    [] HYDROmorphone (Dilaudid) 1 MG/ML injection 0.6 mg  0.6 mg Intravenous Q5 Min PRN Tanner Coffey MD        [COMPLETED] iodixanol (VISIPAQUE) 320 MG/ML injection 150 mL  150 mL Injection ONCE PRN Magdy Palm MD   250 mL at 25    [COMPLETED] ceFAZolin (Ancef) 2g in 10mL IV syringe premix  2 g Intravenous  Q8H Magdy Palm  mL/hr at 25 1104 2 g at 25 1104    [COMPLETED] furosemide (Lasix) 10 mg/mL injection 40 mg  40 mg Intravenous Once Bunny Atwood MD   40 mg at 25 2136    [COMPLETED] iopamidol 76% (ISOVUE-370) injection for power injector  100 mL Intravenous ONCE PRN Sumit Kapoor MD   100 mL at 25 0924    [COMPLETED] iopamidol 76% (ISOVUE-370) injection for power injector  80 mL Intravenous ONCE PRN Matheus Hayes MD   80 mL at 25 0833    [] nitroGLYCERIN in dextrose 5% 50 mg/250mL infusion premix             [COMPLETED] lidocaine (Xylocaine) 1 % injection  10 mL Intradermal Once Woodrow Boswell MD   10 mL at 25 1559    [COMPLETED] potassium phosphate dibasic 15 mmol in sodium chloride 0.9% 250 mL IVPB  15 mmol Intravenous Once Woodrow Boswell MD 62.5 mL/hr at 25 1826 15 mmol at 25 1826    Followed by    [COMPLETED] potassium chloride 20 mEq/100mL IVPB premix 20 mEq  20 mEq Intravenous Once Woodrow Boswell MD 50 mL/hr at 25 0002 20 mEq at 25 0002     Current Outpatient Medications on File Prior to Encounter   Medication Sig Dispense Refill    furosemide 40 MG Oral Tab Take 1 tablet (40 mg total) by mouth daily.      clopidogrel 75 MG Oral Tab Take 1 tablet (75 mg total) by mouth daily.      thiamine 100 MG Oral Tab TAKE 1 TABLET DAILY WITH FEEDING TUBE (Patient taking differently: Take 1 tablet (100 mg total) by mouth daily.) 90 tablet 1    cloNIDine 0.1 MG Oral Tab Take 0.5 tablets (0.05 mg total) by mouth daily.      metoprolol succinate ER 25 MG Oral Tablet 24 Hr Take 1 tablet (25 mg total) by mouth daily.      levocetirizine 5 MG Oral Tab Take 1 tablet (5 mg total) by mouth every evening. 30 tablet 1    amoxicillin 875 MG Oral Tab Take 1 tablet (875 mg total) by mouth 2 (two) times daily for 10 days. 20 tablet 0    hydroCHLOROthiazide 12.5 MG Oral Tab Take 1 tablet (12.5 mg total) by mouth daily. 30 tablet 2     hydrALAZINE 100 MG Oral Tab Take 1 tablet (100 mg total) by mouth in the morning, at noon, and at bedtime. 90 tablet 2    aspirin 81 MG Oral Tab EC Take 1 tablet (81 mg total) by mouth daily. 30 tablet 0    acetaminophen 500 MG Oral Tab Take 2 tablets (1,000 mg total) by mouth every 6 (six) hours as needed for Pain.      ezetimibe 10 MG Oral Tab Take 1 tablet (10 mg total) by mouth in the morning.      pantoprazole 40 MG Oral Tab EC Take 1 tablet (40 mg total) by mouth every morning before breakfast. 90 tablet 3    bisacodyl 5 MG Oral Tab EC Take 1 tablet (5 mg total) by mouth daily as needed. 30 tablet 0    docusate sodium (COLACE) 100 MG Oral Cap Take 1 capsule (100 mg total) by mouth 2 (two) times daily as needed for constipation. 180 capsule 1    Ferrous Gluconate 324 (37.5 Fe) MG Oral Tab Take 1 tablet (324 mg total) by mouth daily. 90 tablet 1    azelastine 0.1 % Nasal Solution 1 spray by Nasal route 2 (two) times daily. (Patient taking differently: 1 spray by Nasal route daily as needed for Rhinitis.) 3 each 1    mirtazapine 7.5 MG Oral Tab 1 tablet (7.5 mg total) by Per G Tube route nightly. (Patient taking differently: 1 tablet (7.5 mg total) by Per G Tube route as needed.) 30 tablet 0    amLODIPine 10 MG Oral Tab Take 1 tablet (10 mg total) by mouth daily. 90 tablet 3    sulfamethoxazole-trimethoprim -160 MG Oral Tab per tablet Take 1 tablet by mouth 2 (two) times daily.

## 2025-05-05 NOTE — ANESTHESIA PREPROCEDURE EVALUATION
PRE-OP EVALUATION    Patient Name: Alisha Wilde    Admit Diagnosis: WOUND OF RIGHT GROIN, INITIAL ENCOUNTER    Pre-op Diagnosis: WOUND OF RIGHT GROIN, INITIAL ENCOUNTER    RIGHT GROIN INCISION AND DRAINAGE    Anesthesia Procedure: RIGHT GROIN INCISION AND DRAINAGE (Right)    Surgeons and Role:     * Frank Holder MD - Primary    Pre-op vitals reviewed.  Temp: 97.6 °F (36.4 °C)  Pulse: 82  Resp: 18  BP: 120/63  SpO2: 94 %  Body mass index is 35.18 kg/m².    Current medications reviewed.  Hospital Medications:  Current Medications[1]    Outpatient Medications:   Prescriptions Prior to Admission[2]    Allergies: Atorvastatin, Pravastatin, Rosuvastatin, and Seasonal      Anesthesia Evaluation    Patient summary reviewed.    Anesthetic Complications           GI/Hepatic/Renal      (+) GERD                           Cardiovascular                  (+) hypertension                                     Endo/Other      (+) diabetes                            Pulmonary    Negative pulmonary ROS.                       Neuro/Psych    Negative neuro/psych ROS.                          Patient Active Problem List:     Hypercholesteremia     Benign essential HTN     Vitamin D deficiency     Adjustment disorder with mixed anxiety and depressed mood     Controlled type 2 diabetes mellitus without complication, without long-term current use of insulin (HCC)     Obesity (BMI 30-39.9)     Polyp of colon     Flat feet, bilateral     Myalgia due to statin     Dissection of ascending aorta (HCC)     Stage 3 chronic kidney disease (HCC)     Iron deficiency     Constipation     Leg swelling     Gastroesophageal reflux disease     Dissection of aorta, unspecified portion of aorta (HCC)     Aneurysm of aortic arch     Sinus pause     Nasal congestion     Tachycardia-bradycardia (HCC)           Past Surgical History[3]  Social Hx on file[4]  History   Drug Use No     Available pre-op labs reviewed.  Lab Results   Component Value Date    WBC  9.6 04/14/2025    RBC 3.47 (L) 04/14/2025    HGB 8.9 (L) 04/14/2025    HCT 27.5 (L) 04/14/2025    MCV 79.3 (L) 04/14/2025    MCH 25.6 (L) 04/14/2025    MCHC 32.4 04/14/2025    RDW 17.6 04/14/2025    .0 (H) 04/14/2025     Lab Results   Component Value Date     04/14/2025    K 3.9 04/14/2025     04/14/2025    CO2 23.0 04/14/2025    BUN 7 (L) 04/14/2025    CREATSERUM 1.09 (H) 04/14/2025     (H) 04/14/2025    CA 9.8 04/14/2025            Airway      Mallampati: II  Mouth opening: >3 FB  TM distance: > 6 cm  Neck ROM: full Cardiovascular    Cardiovascular exam normal.         Dental    Dentition appears grossly intact         Pulmonary    Pulmonary exam normal.                 Other findings              ASA: 3   Plan: general  NPO status verified and patient meets guidelines.        Comment:  I explained intrinsic risks of general anesthesia, including nausea, dental damage, sore throat, mouth injury,and hoarseness from airway management.  All questions were answered and understanding was demonstrated of risks.  Informed permission was obtained to proceed as documented in the signed consent form.        Plan/risks discussed with: patient                Present on Admission:  **None**             [1]   No current facility-administered medications on file as of 5/5/2025.   [2]   Medications Prior to Admission   Medication Sig Dispense Refill Last Dose/Taking    furosemide 40 MG Oral Tab Take 1 tablet (40 mg total) by mouth daily.   5/4/2025    clopidogrel 75 MG Oral Tab Take 1 tablet (75 mg total) by mouth daily.   5/5/2025 Morning    thiamine 100 MG Oral Tab TAKE 1 TABLET DAILY WITH FEEDING TUBE (Patient taking differently: Take 1 tablet (100 mg total) by mouth daily.) 90 tablet 1 Taking Differently    cloNIDine 0.1 MG Oral Tab Take 0.5 tablets (0.05 mg total) by mouth daily.   5/5/2025 Morning    metoprolol succinate ER 25 MG Oral Tablet 24 Hr Take 1 tablet (25 mg total) by mouth daily.    5/5/2025 Morning    levocetirizine 5 MG Oral Tab Take 1 tablet (5 mg total) by mouth every evening. 30 tablet 1 5/4/2025    amoxicillin 875 MG Oral Tab Take 1 tablet (875 mg total) by mouth 2 (two) times daily for 10 days. 20 tablet 0 5/4/2025    hydroCHLOROthiazide 12.5 MG Oral Tab Take 1 tablet (12.5 mg total) by mouth daily. 30 tablet 2 5/5/2025 Morning    hydrALAZINE 100 MG Oral Tab Take 1 tablet (100 mg total) by mouth in the morning, at noon, and at bedtime. 90 tablet 2 5/5/2025 Morning    aspirin 81 MG Oral Tab EC Take 1 tablet (81 mg total) by mouth daily. 30 tablet 0 5/5/2025 Morning    acetaminophen 500 MG Oral Tab Take 2 tablets (1,000 mg total) by mouth every 6 (six) hours as needed for Pain.   Taking As Needed    ezetimibe 10 MG Oral Tab Take 1 tablet (10 mg total) by mouth in the morning.   5/4/2025    pantoprazole 40 MG Oral Tab EC Take 1 tablet (40 mg total) by mouth every morning before breakfast. 90 tablet 3 Taking    bisacodyl 5 MG Oral Tab EC Take 1 tablet (5 mg total) by mouth daily as needed. 30 tablet 0 Taking As Needed    docusate sodium (COLACE) 100 MG Oral Cap Take 1 capsule (100 mg total) by mouth 2 (two) times daily as needed for constipation. 180 capsule 1 Taking As Needed    Ferrous Gluconate 324 (37.5 Fe) MG Oral Tab Take 1 tablet (324 mg total) by mouth daily. 90 tablet 1 5/4/2025    azelastine 0.1 % Nasal Solution 1 spray by Nasal route 2 (two) times daily. (Patient taking differently: 1 spray by Nasal route daily as needed for Rhinitis.) 3 each 1 Taking Differently    mirtazapine 7.5 MG Oral Tab 1 tablet (7.5 mg total) by Per G Tube route nightly. (Patient taking differently: 1 tablet (7.5 mg total) by Per G Tube route as needed.) 30 tablet 0 Taking Differently    amLODIPine 10 MG Oral Tab Take 1 tablet (10 mg total) by mouth daily. 90 tablet 3 5/5/2025 Morning    sulfamethoxazole-trimethoprim -160 MG Oral Tab per tablet Take 1 tablet by mouth 2 (two) times daily.      [3]    Past Surgical History:  Procedure Laterality Date    Anesth,open heart surgery  2024    Colonoscopy N/A 2018    Procedure: COLONOSCOPY;  Surgeon: Magdy Webber MD;  Location: [facility] ENDOSCOPY    Colonoscopy  2018    Endometrial ablation      child passed away for 5 mins          1    Other surgical history  2011    cysto-Dr. Lacey -- pt denies   [4]   Social History  Socioeconomic History    Marital status: Single   Tobacco Use    Smoking status: Former     Current packs/day: 0.00     Average packs/day: 1 pack/day for 13.0 years (13.0 ttl pk-yrs)     Types: Cigarettes     Start date:      Quit date:      Years since quittin.3    Smokeless tobacco: Former   Vaping Use    Vaping status: Never Used   Substance and Sexual Activity    Alcohol use: Not Currently     Alcohol/week: 1.0 - 2.0 standard drink of alcohol     Types: 1 - 2 Cans of beer per week     Comment: every day  NOT DRINKING FOR LAS 4 MONTHS    Drug use: No

## 2025-05-05 NOTE — WOUND PROGRESS NOTE
[facility]  Inpatient Wound Care Contact Note    Alisha Wilde Patient Status:  Inpatient    10/25/1965 MRN [id number]   Location [facility] 2NE-A Attending Frank Holder MD   Hosp Day # 0 PCP Bridger Avendano MD     Spoke with .MD removed the wound vac due to bleeding/leakage.Will apply wet to dry dressing to until seen by wound care.    We will continue to follow this patient while in-house and assist with wound care discharge planning.    Please call me at [phone]  if you have any questions about this consultation and plan of care.      Thank you,    Janet Newby RN BSN [facility]  [address] [address]

## 2025-05-05 NOTE — BRIEF OP NOTE
Pre-Operative Diagnosis: Superficial abscess of right groin post percutaneous procedure     Post-Operative Diagnosis: Superficial abscess of right groin post percutaneous procedure     Procedure Performed:   Incision and drainage of superficial abscess   Sharp excisional debridement of small phlegmon - total area 2x2x2 cm     Surgeons and Role:     * Frank Holder MD - Primary    Assistant(s):        Surgical Findings:   No exposed artery   Excised abscess and phlegmon cavity     Specimen: abscess and phlegmon cavity     Estimated Blood Loss: 10 mL     Frank Holder MD  5/5/2025  7:20 AM

## 2025-05-06 ENCOUNTER — ANESTHESIA EVENT (OUTPATIENT)
Dept: CARDIAC SURGERY | Facility: HOSPITAL | Age: 60
End: 2025-05-06
Payer: COMMERCIAL

## 2025-05-06 PROBLEM — I45.9 HEART BLOCK: Status: ACTIVE | Noted: 2025-05-06

## 2025-05-06 LAB
ANION GAP SERPL CALC-SCNC: 11 MMOL/L (ref 0–18)
ATRIAL RATE: 78 BPM
BASOPHILS # BLD AUTO: 0.02 X10(3) UL (ref 0–0.2)
BASOPHILS NFR BLD AUTO: 0.4 %
BUN BLD-MCNC: 12 MG/DL (ref 9–23)
CALCIUM BLD-MCNC: 9.4 MG/DL (ref 8.7–10.6)
CHLORIDE SERPL-SCNC: 105 MMOL/L (ref 98–112)
CO2 SERPL-SCNC: 22 MMOL/L (ref 21–32)
CREAT BLD-MCNC: 1.53 MG/DL (ref 0.55–1.02)
EGFRCR SERPLBLD CKD-EPI 2021: 38 ML/MIN/1.73M2 (ref 60–?)
EOSINOPHIL # BLD AUTO: 0.06 X10(3) UL (ref 0–0.7)
EOSINOPHIL NFR BLD AUTO: 1.1 %
ERYTHROCYTE [DISTWIDTH] IN BLOOD BY AUTOMATED COUNT: 17.7 %
GLUCOSE BLD-MCNC: 107 MG/DL (ref 70–99)
HCT VFR BLD AUTO: 27.1 % (ref 35–48)
HGB BLD-MCNC: 8.4 G/DL (ref 12–16)
IMM GRANULOCYTES # BLD AUTO: 0.01 X10(3) UL (ref 0–1)
IMM GRANULOCYTES NFR BLD: 0.2 %
LYMPHOCYTES # BLD AUTO: 1.07 X10(3) UL (ref 1–4)
LYMPHOCYTES NFR BLD AUTO: 19.9 %
MAGNESIUM SERPL-MCNC: 2.1 MG/DL (ref 1.6–2.6)
MCH RBC QN AUTO: 24.5 PG (ref 26–34)
MCHC RBC AUTO-ENTMCNC: 31 G/DL (ref 31–37)
MCV RBC AUTO: 79 FL (ref 80–100)
MONOCYTES # BLD AUTO: 0.51 X10(3) UL (ref 0.1–1)
MONOCYTES NFR BLD AUTO: 9.5 %
NEUTROPHILS # BLD AUTO: 3.72 X10 (3) UL (ref 1.5–7.7)
NEUTROPHILS # BLD AUTO: 3.72 X10(3) UL (ref 1.5–7.7)
NEUTROPHILS NFR BLD AUTO: 68.9 %
OSMOLALITY SERPL CALC.SUM OF ELEC: 286 MOSM/KG (ref 275–295)
P AXIS: 66 DEGREES
P-R INTERVAL: 188 MS
PLATELET # BLD AUTO: 241 10(3)UL (ref 150–450)
POTASSIUM SERPL-SCNC: 3.7 MMOL/L (ref 3.5–5.1)
Q-T INTERVAL: 380 MS
QRS DURATION: 92 MS
QTC CALCULATION (BEZET): 433 MS
R AXIS: -5 DEGREES
RBC # BLD AUTO: 3.43 X10(6)UL (ref 3.8–5.3)
SODIUM SERPL-SCNC: 138 MMOL/L (ref 136–145)
T AXIS: 47 DEGREES
VENTRICULAR RATE: 78 BPM
WBC # BLD AUTO: 5.4 X10(3) UL (ref 4–11)

## 2025-05-06 PROCEDURE — 99232 SBSQ HOSP IP/OBS MODERATE 35: CPT | Performed by: INTERNAL MEDICINE

## 2025-05-06 RX ORDER — FLUTICASONE PROPIONATE 50 MCG
1 SPRAY, SUSPENSION (ML) NASAL DAILY
Status: DISCONTINUED | OUTPATIENT
Start: 2025-05-06 | End: 2025-05-10

## 2025-05-06 NOTE — CM/SW NOTE
Anticipated therapy need for pt at [facility] is [facility].    Referral sent in [facility]. Awaiting responses.    Will provide options list once available.       to remain available for support and/or discharge planning.    LETICIA Echevarria  Discharge Planner  [phone]   Hospitalist Progress Note    NAME: Sherman Mccann Sr.   :  1956   MRN:  [id number]       Assessment / Plan:  Unstable angina in settings of CAD with h/o multiple stents in the past   HTN   -Card cath  with severe disease, now in ICU on   Poor CABG candidate   IMpella and cath with stent today ( very high risk procedure)    Cardiology help appreciated   -cont home Coreg and lisinopril   Cont ASA    DM type II with  Nephropathy  -BS low side   -Hold home Tradjenta   -c/w SS as needed     Thrombocytopenia  -Plt at baseline normal. Admission 53  Heparin gtt was stopped   On angiomax now   -follow HIT     ESRD   -HD TThSa   -Renal help appreciated. Renal consult for HD. Renally dose Rx as appropriate. PAD  Multiple small wounds    -Patient is scheduled to have a right second toe amputation on 10/1/2019  -Continue aspirin and Plavix  -consult wound care    Depression, home trazodone  H/o R renal artery stenosis, s/p stent 2018                  Code Status: full   Surrogate Decision Maker: Wife Alessandra Jurado  DVT Prophylaxis: Scd's pending card cath         Baseline: lives at home with wife and his son; independent   Recommended Disposition: TBD     Subjective:     Chief Complaint / Reason for Physician Visit: following unstable angina   In ICU  For card cath today   He verbalized understanding of very high risk procedure today     Discussed with RN events overnight. Review of Systems:  Symptom Y/N Comments  Symptom Y/N Comments   Fever/Chills    Chest Pain y intermittent    Poor Appetite    Edema     Cough    Abdominal Pain     Sputum    Joint Pain     SOB/COREA n   Pruritis/Rash     Nausea/vomit    Tolerating PT/OT     Diarrhea    Tolerating Diet     Constipation    Other       Could NOT obtain due to:      Objective:     VITALS:   Last 24hrs VS reviewed since prior progress note.  Most recent are:  Patient Vitals for the past 24 hrs:   Temp Pulse Resp BP SpO2   19 0800 96.9 °F (36.1 °C) 69 12 135/41 99 %   09/06/19 0700  68 17 129/42 93 %   09/06/19 0500 96.9 °F (36.1 °C) 62 11 104/48 94 %   09/06/19 0400 96.5 °F (35.8 °C) 62 12 116/56 94 %   09/06/19 0300  61 18 127/74 95 %   09/06/19 0200  (!) 59 11 112/61 93 %   09/06/19 0100  60 19 107/64 95 %   09/06/19 0000 96.5 °F (35.8 °C) 62 11 104/51 96 %   09/05/19 2330  (!) 59 11 90/46 94 %   09/05/19 2301 96.4 °F (35.8 °C)       09/05/19 2300  60 12 92/42 96 %   09/05/19 2230  66 18 99/50 94 %   09/05/19 2200  66 11 120/54 98 %   09/05/19 2100  65 14 116/56 96 %   09/05/19 2000 96.5 °F (35.8 °C) 72 17 129/46 92 %   09/05/19 1922 96.4 °F (35.8 °C)       09/05/19 1915  78 16  95 %   09/05/19 1900  78 19 (!) 134/91 96 %   09/05/19 1845  76 13  99 %   09/05/19 1834 96.3 °F (35.7 °C)       09/05/19 1830  75 14 132/86 97 %   09/05/19 1815  75 16 (!) 150/98 98 %   09/05/19 1800  74 13  98 %   09/05/19 1745  76 12 147/42 97 %   09/05/19 1730  77 20 (!) 140/104 97 %   09/05/19 1715  78 13 152/42 96 %   09/05/19 1700  81 15 141/49 97 %   09/05/19 1645  79 14 (!) 171/94 100 %   09/05/19 1630  79 18 (!) 162/98 99 %   09/05/19 1615  77 12 (!) 156/32 97 %   09/05/19 1600 96 °F (35.6 °C) 78 13 156/70 97 %   09/05/19 1545  77 12 150/85 96 %   09/05/19 1535  77 13  96 %   09/05/19 1530 95.8 °F (35.4 °C) 76 15 151/87 95 %   09/05/19 1517    153/78    09/05/19 1200  93      09/05/19 1143 97.6 °F (36.4 °C) 94 18 136/63 98 %   09/05/19 1053 97.6 °F (36.4 °C) 90 17 129/70    09/05/19 1045  92 17 136/66    09/05/19 1030  89 18 119/54    09/05/19 1022  92  135/54    09/05/19 1015  92 17 135/54    09/05/19 1000  91 15 125/76    09/05/19 0945  89 18 126/62    09/05/19 0930  88 18 107/76    09/05/19 0915  87 18 121/54    09/05/19 0900  86 18 121/64    09/05/19 0845  86 18 120/63    09/05/19 0830  86 18 113/68    09/05/19 0815  84 18 114/55        Intake/Output Summary (Last 24 hours) at 9/6/2019 0813  Last data filed at 9/6/2019 0400  Gross per 24 hour   Intake 125 ml   Output 1682 ml   Net -1557 ml        PHYSICAL EXAM:  General: WD, WN. Alert, cooperative, no acute distress    EENT:  EOMI. Anicteric sclerae. MMM  Resp:  CTA bilaterally, no wheezing or rales. No accessory muscle use  CV:  Regular  rhythm,  No edema. + BL LE chronic venous changes   GI:  Soft, Non distended, Non tender.  +Bowel sounds  Neurologic:  Alert and oriented X 3, normal speech,   Psych:   Good insight. Not anxious nor agitated  Skin:  No rashes. No jaundice    Reviewed most current lab test results and cultures  YES  Reviewed most current radiology test results   YES  Review and summation of old records today    NO  Reviewed patient's current orders and MAR    YES  PMH/SH reviewed - no change compared to H&P  ________________________________________________________________________  Care Plan discussed with:    Comments   Patient y    Family      RN y    Care Manager     Consultant  y Cardiology Dr Eli Snowball team rounds were held today with , nursing, pharmacist and clinical coordinator. Patient's plan of care was discussed; medications were reviewed and discharge planning was addressed. ________________________________________________________________________  Total NON critical care TIME:  35   Minutes    Total CRITICAL CARE TIME Spent:   Minutes non procedure based      Comments   >50% of visit spent in counseling and coordination of care     ________________________________________________________________________  Keyon Kay MD     Procedures: see electronic medical records for all procedures/Xrays and details which were not copied into this note but were reviewed prior to creation of Plan. LABS:  I reviewed today's most current labs and imaging studies.   Pertinent labs include:  Recent Labs     09/06/19  0401 09/05/19  0540 09/04/19  1333   WBC 3.5* 4.9 6.0   HGB 9.6* 10.8* 11.7*   HCT 30.4* 34.9* 38.4   PLT 49* 53* 55*     Recent Labs     09/06/19  0401 09/05/19  1842 09/05/19  1500 09/05/19  0540 09/04/19  1333     --  133* 139 139   K 3.3*  --  3.3* 4.0 4.2     --  98 106 104   CO2 27  --  27 22 25   *  --  79 90 140*   BUN 35*  --  26* 75* 69*   CREA 4.76*  --  3.80* 7.77* 7.22*   CA 7.2*  --  7.8* 8.2* 8.8   ALB 2.5*  --  2.7*  --  3.2*   TBILI 0.5  --  0.9  --  0.6   SGOT 17  --  15  --  16   ALT 9*  --  10*  --  13   INR  --  1.2*  --   --   --        Signed: Michelle Bedoya MD

## 2025-05-06 NOTE — CONSULTS
Cardiology Consultation    Alisha Wilde Patient Status:  Inpatient    10/25/1965 MRN [id number]   [facility] 2NE-A Attending Frank Holder MD   Hosp Day # 1 PCP Bridger Avendano MD     Reason for Consultation:  nocturnal heart block      History of Present Illness:  Alisha Wilde is a a(n) 59 year old female. Very nice woman presented 2024 with Type A aortic dissection with emergent repair.  Quite ill with trach and PEG, ultimately recovered nicely.  Then presented 3/30/25 with type B dissection that required urgent stent grafting.  During that hospitalization, pauses were noted and beta blocker discontinued.  She was to have a TTM on dc.  She was admitted 25 for surgical excision of a groin abscess.  Overnight, while asleep using CPAP, she had brief episodes of complete AVB, pauses up to 3 seconds.  She denies any unexpected light headedness while awake.    History:  Past Medical History[1]  Past Surgical History[2]  Family History[3]      Allergies:  Allergies[4]    Medications:  Scheduled Medications[5]    Continuous Infusions:  Medication Infusions[6]    Social History:   reports that she quit smoking about 26 years ago. Her smoking use included cigarettes. She started smoking about 39 years ago. She has a 13 pack-year smoking history. She has quit using smokeless tobacco. She reports that she does not currently use alcohol after a past usage of about 1.0 - 2.0 standard drink of alcohol per week. She reports that she does not use drugs.    Review of Systems:  All systems were reviewed and are negative except as described above in HPI.    Physical Exam:      Temp:  [97.4 °F (36.3 °C)-99 °F (37.2 °C)] 98.4 °F (36.9 °C)  Pulse:  [70-92] 92  Resp:  [15-23] 18  BP: ()/(54-70) 126/70  SpO2:  [97 %-100 %] 100 %    Last 3 Weights   25 0600 211 lb 6.7 oz (95.9 kg)   25 1328 210 lb (95.3 kg)   25 1630 210 lb (95.3 kg)   04/15/25 0633 213 lb 6.5 oz (96.8 kg)   25  0611 215 lb 8 oz (97.8 kg)   04/13/25 0622 216 lb 11.4 oz (98.3 kg)   04/13/25 0500 218 lb 4.8 oz (99 kg)   04/12/25 0500 209 lb 9.6 oz (95.1 kg)   04/11/25 0545 208 lb 15.9 oz (94.8 kg)   04/10/25 0530 227 lb 11.2 oz (103.3 kg)   04/09/25 0030 221 lb 12.5 oz (100.6 kg)   04/08/25 0300 233 lb 4 oz (105.8 kg)   04/06/25 0500 236 lb 5.3 oz (107.2 kg)   04/05/25 0400 246 lb 14.6 oz (112 kg)   04/04/25 0300 250 lb (113.4 kg)   03/31/25 0400 227 lb 11.8 oz (103.3 kg)   03/30/25 1654 222 lb 3.6 oz (100.8 kg)   03/30/25 1508 222 lb 3.6 oz (100.8 kg)           General: No apparent distress  HEENT: No focal deficits.  Neck: supple. JVP normal  Cardiac: Regular rhythm, S1, S2 normal,   No rub or gallop.  No murmur.  Lungs: CTA  Abdomen: Soft, non-tender.   Extremities: No edema  Neurologic: no focal deficits  Skin: Warm and dry.          Telemetry: reviewed    Laboratories and Data:  Diagnostics:    EKG, 5/6/2025:  NSR, normal intervals.    CXR, 5/6/2025:      Labs:   HEM:  No results for input(s): \"WBC\", \"HGB\", \"PLT\", \"BANDSPCT\", \"LYMPHOPCT\", \"MONOPCT\" in the last 168 hours.    Invalid input(s): \"NEUTOPHILPCT\", \"EOSPCT\"    Chem:  No results for input(s): \"NA\", \"K\", \"CL\", \"CO2\", \"BUN\", \"CREATSERUM\", \"CA\", \"CAION\", \"MG\", \"PHOS\", \"GLU\" in the last 168 hours.    No results for input(s): \"ALT\", \"AST\", \"ALB\", \"AMYLASE\", \"LIPASE\", \"LDH\" in the last 168 hours.    Invalid input(s): \"ALPHOS\", \"TBIL\", \"DBIL\", \"TPROT\"    No results for input(s): \"PTP\", \"INR\" in the last 168 hours.    No results for input(s): \"TROP\", \"CK\" in the last 168 hours.      Impression:   Nocturnal complete AVB with pauses, max 3 seconds last night.  No unexpected presyncope or syncope while awake.  Preserved EF April 2025.  Type A dissection with surgical repair, trach, PEG Nov 2024.  Type B dissection 3/30/25, stent grafting.  S/p excision of groin abscess on 5/5/25.  HTN  TANYA, on CPAP  CRI    Plan:  Stop metoprolol, continue other cardiac meds.  Plan per PV  surgery, CV status ok for dc.  Two week TTM needs to be arranged for her on dc and she will f/u in [place] office.    Angelo Mosher MD  2025  7:03 AM  C5         [1]   Past Medical History:   Arthritis    Chronic kidney disease (CKD)    Esophageal reflux    High blood pressure    High cholesterol    Hyperlipidemia    HYPERTENSION    Hypertension    Unspecified essential hypertension   [2]   Past Surgical History:  Procedure Laterality Date    Anesth,open heart surgery  2024    Colonoscopy N/A 2018    Procedure: COLONOSCOPY;  Surgeon: Magdy Webber MD;  Location: [facility] ENDOSCOPY    Colonoscopy  2018    Endometrial ablation      child passed away for 5 mins          1    Other surgical history  2011    cysto-Dr. Lacey -- pt denies   [3]   Family History  Problem Relation Age of Onset    Hypertension Mother     Heart Disorder Mother 70    Other (Other) Mother         kidney failure    Hypertension Father     Hypertension Maternal Grandfather     Cancer Neg     Diabetes Neg     Glaucoma Neg     Macular degeneration Neg    [4]   Allergies  Allergen Reactions    Atorvastatin MYALGIA    Pravastatin MYALGIA    Rosuvastatin MYALGIA    Seasonal Runny nose   [5]    amLODIPine  10 mg Oral Daily    ezetimibe  10 mg Oral Daily    pantoprazole  40 mg Oral QAM AC    aspirin  81 mg Oral Daily    hydroCHLOROthiazide  12.5 mg Oral Daily    hydrALAZINE  100 mg Oral TID    cloNIDine  0.05 mg Oral Daily    metoprolol succinate ER  25 mg Oral Daily Beta Blocker    clopidogrel  75 mg Oral Daily    furosemide  40 mg Oral Daily    thiamine  100 mg Oral Daily    cetirizine  5 mg Oral Nightly    ferrous sulfate  325 mg Oral Daily with breakfast   [6]

## 2025-05-06 NOTE — TELEPHONE ENCOUNTER
Patient called forms dept. Patient was calling wanting on her forms. Patient states she emailed her forms last week. Forms located in logged folder. Patient states forms are for Dr Avendano and not vascular surgeon. Patient states Karen is seeking update. Informed patient previous forms had patient out until next re-eval 05/20/25. Patient stated Karen wants and update on her condition. Informed patient we can update/revise form and it will go pending her next re-eval 05/19/25. Patient became frustrated and said\" I am tired I will talk to you guys later\" and call dropped.

## 2025-05-06 NOTE — PHYSICAL THERAPY NOTE
PHYSICAL THERAPY EVALUATION - INPATIENT     Room Number: 2616/2616-A  Evaluation Date: 5/6/2025  Type of Evaluation: Initial  Physician Order: PT Eval and Treat    Presenting Problem: Superficial abcess of R groin s/p recent endovascular aortic arch dissection repair  Co-Morbidities : HTN, HLD, CKDIII  Reason for Therapy: Mobility Dysfunction and Discharge Planning    PHYSICAL THERAPY ASSESSMENT   Patient is a 59 year old female admitted 5/5/2025 for superficial abcess of R groin s/p I&D on 5/5/25.  Prior to admission, patient's baseline is independent with ambulation in the home, use of cane in the community.  Patient is currently functioning near baseline with bed mobility, transfers, and gait.  Patient is requiring modified independent, stand-by assist, and contact guard assist as a result of the following impairments: decreased functional strength, pain, impaired standing balance, decreased muscular endurance, medical status, and impaired integumentary integrity .  Physical therapy will continue to follow for duration of hospitalization.    Patient will benefit from continued skilled PT Services at discharge to promote prior level of function and safety with additional support and return home with home health PT.    PLAN DURING HOSPITALIZATION  Nursing Mobility Recommendation : 1 Assist  PT Device Recommendation: Cane  PT Treatment Plan: Bed mobility, Endurance, Patient education, Family education, Gait training, Neuromuscular re-educate, Strengthening, Transfer training, Balance training  Rehab Potential : Good  Frequency (Obs):  (2-3x week)     CURRENT GOALS    Goal #1 Patient is able to demonstrate supine - sit EOB @ level: modified independent  MET 5/6   Goal #2 Patient is able to demonstrate transfers Sit to/from Stand at assistance level: modified independent     Goal #3 Patient is able to ambulate 150 feet with assist device: cane - straight at assistance level: modified independent     Goal #4    Goal #5     Goal #6    Goal Comments: Goals established on 2025      PHYSICAL THERAPY MEDICAL/SOCIAL HISTORY  History related to current admission: Patient is a 59 year old female admitted on 2025 for planned surgical intervention for superficial abcess of R groin s/p recent endovascular aortic arch dissection repair.  Pt s/p I&D of R groin on 25.    Recent admission:  3/30/25-4/15/25 with aortic arch dissection s/p endovascular repair, discharged home    HOME SITUATION  Type of Home: Apartment (5th floor)  Home Layout: One level, Elevator  Stairs to Enter : 6   Railing: Yes    Stairs to Bedroom: 0         Lives With: Alone               Prior Level of [place]: The pt reports she is typically independent with ambulation in her apartment, but uses a cane for community ambulation.  Pt is typically independent with I/ADL's, but was receiving help from her nephew, with shopping and cooking, following her recent hospitalization.  Pt denies any falls at home.    SUBJECTIVE  \"I can walk if you promise to get me all covered up when we're done.\"    OBJECTIVE  Precautions:  (R groin wound)  Fall Risk: Standard fall risk    WEIGHT BEARING RESTRICTION     PAIN ASSESSMENT  Ratin  Location: headache, R groin  Management Techniques: Activity promotion, Repositioning    COGNITION  Overall Cognitive Status:  WNL    RANGE OF MOTION AND STRENGTH ASSESSMENT  Upper extremity ROM and strength are within functional limits     Lower extremity ROM is within functional limits     Lower extremity strength is within functional limits except for the following:    Right Hip flexion  4-/5  Left Hip flexion  4-/5    BALANCE  Static Sitting: Good  Dynamic Sitting: Fair +  Static Standing: Fair -  Dynamic Standing: Poor +    ADDITIONAL TESTS                                    ACTIVITY TOLERANCE  Pulse: 97  Heart Rate Source: Monitor                   O2 WALK  Oxygen Therapy  SPO2% on Room Air at Rest: 100    NEUROLOGICAL FINDINGS                         AM-PAC '6-Clicks' INPATIENT SHORT FORM - BASIC MOBILITY  How much difficulty does the patient currently have...  Patient Difficulty: Turning over in bed (including adjusting bedclothes, sheets and blankets)?: None   Patient Difficulty: Sitting down on and standing up from a chair with arms (e.g., wheelchair, bedside commode, etc.): A Little   Patient Difficulty: Moving from lying on back to sitting on the side of the bed?: A Little   How much help from another person does the patient currently need...   Help from Another: Moving to and from a bed to a chair (including a wheelchair)?: A Little   Help from Another: Need to walk in hospital room?: A Little   Help from Another: Climbing 3-5 steps with a railing?: A Little     AM-PAC Score:  Raw Score: 19   Approx Degree of Impairment: 41.77%   Standardized Score (AM-PAC Scale): 45.44   CMS Modifier (G-Code): CK    FUNCTIONAL ABILITY STATUS  Gait Assessment   Functional Mobility/Gait Assessment  Gait Assistance:  (CGA progressed to SBA)  Distance (ft): 200  Assistive Device: Cane  Pattern: R Foot flat, L Foot flat (step through gait)    Skilled Therapy Provided     Bed Mobility:  Rolling: Mod I  Supine to sit: Mod I   Sit to supine: Mod I     Transfer Mobility:  Sit to stand: SBA   Stand to sit: SBA  Gait = CGA progressed to SBA for final 75 feet    Therapist's Comments: Pt educated on benefits of AD (cane vs RW) for limb unloading/pain control.  Recommended pt utilize AD for ambulation during recovery, and pt agreeable.    Exercise/Education Provided:  Functional activity tolerated  Gait training  Transfer training  Activity recommendations while admitted: Sit in chair for all meals, ambulate with staff assistance TID, pt reports understanding    Patient End of Session: In bed, Needs met, Call light within reach, RN aware of session/findings, All patient questions and concerns addressed, Hospital anti-slip socks, SCDs in place      Patient Evaluation  Complexity Level:  History High - 3 or more personal factors and/or co-morbidities   Examination of body systems Moderate - addressing a total of 3 or more elements   Clinical Presentation  Moderate - Evolving   Clinical Decision Making Low Complexity       PT Session Time: 30 minutes  Gait Training: 10 minutes

## 2025-05-06 NOTE — TELEPHONE ENCOUNTER
Noted, Dr Avendano,  it was already discussed with pt from earlier conversation.     Thank you      JASMINE

## 2025-05-06 NOTE — TELEPHONE ENCOUNTER
Spoke with patient, Date of Birth verified  She stated she is currently admitted at the Hospital.   She is asking about her form and return date to work.   Explained to patient, we can't place a return to work date if she still in the hospital.   She needs to communicate with attending Physician and if she is discharge they will advise her to follow up with PCP or specialist prior release to work.   We don't have an estimated date of return to work yet, explained to patient the process.   She became upset and stated she will reach out to surgeon office.     FYKIRK

## 2025-05-06 NOTE — PLAN OF CARE
Assumed care of patient @ 1930    Patient alert and oriented x4. On RA during the day, CPAP at night. NSR on tele. Purewick in place at this time, continent. C/o constipation, colace PRN. Denies pain at this time, norco PRN for post op R groin site. Bedrest for now, PT to see. Call light within reach. Fall precautions in place. Makes needs known    0000  Noted pause/yoon down to 30s on tele. Patient asymptomatic, asleep at the time. VSS. Strip saved in chart. Notified hospitalist, cards to see in am.    Plan  Wound care - wound vac replace  IV ancef  PT    Problem: Patient/Family Goals  Goal: Patient/Family Long Term Goal  Description: Patient's Long Term Goal: Stay healthy  Interventions:- Resume home med regimen, follow up with providers- See additional Care Plan goals for specific interventions  Outcome: Progressing  Goal: Patient/Family Short Term Goal  Description: Patient's Short Term Goal: Discharge from hospital  Interventions: - Post op monitor, wound care to see, IV ancef- See additional Care Plan goals for specific interventions  Outcome: Progressing     Problem: SKIN/TISSUE INTEGRITY - ADULT  Goal: Skin integrity remains intact  Description: INTERVENTIONS- Assess and document risk factors for pressure ulcer development- Assess and document skin integrity- Monitor for areas of redness and/or skin breakdown- Initiate interventions, skin care algorithm/standards of care as needed  Outcome: Progressing  Goal: Incision(s), wounds(s) or drain site(s) healing without S/S of infection  Description: INTERVENTIONS:- Assess and document risk factors for pressure ulcer development- Assess and document skin integrity- Assess and document dressing/incision, wound bed, drain sites and surrounding tissue- Implement wound care per orders- Initiate isolation precautions as appropriate- Initiate Pressure Ulcer prevention bundle as indicated  Outcome: Progressing  Goal: Oral mucous membranes remain intact  Description:  Note made in error INTERVENTIONS- Assess oral mucosa and hygiene practices- Implement preventative oral hygiene regimen- Implement oral medicated treatments as ordered  Outcome: Progressing     Problem: PAIN - ADULT  Goal: Verbalizes/displays adequate comfort level or patient's stated pain goal  Description: INTERVENTIONS:- Encourage pt to monitor pain and request assistance- Assess pain using appropriate pain scale- Administer analgesics based on type and severity of pain and evaluate response- Implement non-pharmacological measures as appropriate and evaluate response- Consider cultural and social influences on pain and pain management- Manage/alleviate anxiety- Utilize distraction and/or relaxation techniques- Monitor for opioid side effects- Notify MD/LIP if interventions unsuccessful or patient reports new pain- Anticipate increased pain with activity and pre-medicate as appropriate  Outcome: Progressing

## 2025-05-06 NOTE — PROGRESS NOTES
[facility]   part of [facility]     Hospitalist Progress Note     Alisha Wilde Patient Status:  Inpatient    10/25/1965 MRN [id number]   Location [facility] 2NE-A Attending Frank Holder MD   Hosp Day # 1 PCP Bridger Avendano MD     Chief Complaint: med management    Subjective:     Patient without acute events overnight. Pain controlled. No F/C. No CP or SOB.     Objective:    Review of Systems:   A comprehensive review of systems was completed; pertinent positive and negatives stated in subjective.    Vital signs:  Temp:  [97.5 °F (36.4 °C)-99 °F (37.2 °C)] 98.6 °F (37 °C)  Pulse:  [79-95] 94  Resp:  [18-26] 23  BP: (112-140)/(64-73) 112/67  SpO2:  [93 %-100 %] 97 %    Physical Exam:    General: No acute distress  Respiratory: No wheezes, no rhonchi  Cardiovascular: S1, S2, regular rate and rhythm  Abdomen: Soft, Non-tender, non-distended, positive bowel sounds  Neuro: No new focal deficits.   Extremities: No edema      Diagnostic Data:    Labs:  Recent Labs   Lab 25  0719   WBC 5.4   HGB 8.4*   MCV 79.0*   .0       Recent Labs   Lab 25  0719   *   BUN 12   CREATSERUM 1.53*   CA 9.4      K 3.7      CO2 22.0       Estimated Glomerular Filtration Rate: 38 mL/min/1.73m2 (A) (result from lab).    No results for input(s): \"TROP\", \"TROPHS\", \"CK\" in the last 168 hours.    No results for input(s): \"PTP\", \"INR\" in the last 168 hours.               Microbiology    Hospital Encounter on 25   1. Tissue Aerobic Culture     Status: None (Preliminary result)    Collection Time: 25  6:57 AM    Specimen: Tissue   Result Value Ref Range    Tissue Culture Result No Growth at 18-24 hrs. N/A    Tissue Smear No WBCs seen N/A    Tissue Smear No organisms seen N/A         Imaging: Reviewed in Epic.    Medications:    amLODIPine  10 mg Oral Daily    ezetimibe  10 mg Oral Daily    pantoprazole  40 mg Oral QAM AC    aspirin  81 mg Oral Daily    hydroCHLOROthiazide  12.5 mg  Oral Daily    hydrALAZINE  100 mg Oral TID    cloNIDine  0.05 mg Oral Daily    clopidogrel  75 mg Oral Daily    furosemide  40 mg Oral Daily    thiamine  100 mg Oral Daily    cetirizine  5 mg Oral Nightly    ferrous sulfate  325 mg Oral Daily with breakfast       Assessment & Plan:      #Right Groin superficial abscess  S/p I&D with Dr. Holder  F/u am labs  F/u OR cultures  SCD for dvt proph     #Hx aortic arch dissection with aneurysm  #Prior Type A aortic dissection   #Hx left renal artery dissection   #Essential HTN  ASA, Plavix  Amlodipine, Clonidine, Lasix, Hydralazine, hydrochlorothiazide    #Heart Block  On tele  Cardiology following  Stop BB  Plan TTM on DC     #CKD 3A  Monitor BMP     #TANYA     #GERD  PPI     #HLD  Rahtia          Sumit Kapoor MD    Supplementary Documentation:     Quality:  DVT Mechanical Prophylaxis:   SCDs,    DVT Pharmacologic Prophylaxis   Medication   None         DVT Pharmacologic prophylaxis: Aspirin 81 mg      Code Status: Full Code  Webb: External urinary catheter in place  Webb Duration (in days):   Central line:    BRANDON:     Discharge is dependent on: progress  At this point Ms. Wilde is expected to be discharge to: home    The 21st Century Cures Act makes medical notes like these available to patients in the interest of transparency. Please be advised this is a medical document. Medical documents are intended to carry relevant information, facts as evident, and the clinical opinion of the practitioner. The medical note is intended as peer to peer communication and may appear blunt or direct. It is written in medical language and may contain abbreviations or verbiage that are unfamiliar.

## 2025-05-06 NOTE — CM/SW NOTE
Pt discussed in rounds and chart was reviewed.    SW noted and acknowledged an order for a wound vac.      Per CYNDIE Pang, wound care removed wound vac and are not planning to place it back on as of now. RN stated tentative plan is for pt to have a repeat debridement tomorrow with Dr. Holder.    CYNDIE Pang stated to hold off on wound vac order for now.      Will continue to monitor for plan regarding wound vac.       to remain available for support and/or discharge planning.    LETICIA Echevarria  Discharge Planner  [phone]

## 2025-05-06 NOTE — CONSULTS
[facility]  Report of Inpatient Wound Care Consultation    Alisha Wilde Patient Status:  Inpatient    10/25/1965 MRN [id number]   Location [facility] 2NE-A Attending Frank Holder MD   Hosp Day # 1 PCP Bridger Avendano MD     Reason for Consultation:  wound vac     History of Present Illness:  Alisha Wilde is a a(n) 59 year old female. Patient with multiple comorbidities, with skin breakdown described below.       History:  Past Medical History[1]  Past Surgical History[2]   reports that she quit smoking about 26 years ago. Her smoking use included cigarettes. She started smoking about 39 years ago. She has a 13 pack-year smoking history. She has quit using smokeless tobacco. She reports that she does not currently use alcohol after a past usage of about 1.0 - 2.0 standard drink of alcohol per week. She reports that she does not use drugs.      Allergies:  @ALLERGY    Laboratory Data:    Recent Labs   Lab 25  0719   WBC 5.4   HGB 8.4*   HCT 27.1*   .0   CREATSERUM 1.53*   BUN 12   *   CA 9.4         ASSESSMENT:  Wound 25 Leg Anterior;Proximal;Right;Upper (Active)   Date First Assessed/Time First Assessed: 25 0702   Location: Leg  Wound Location Orientation: Anterior;Proximal;Right;Upper  Wound Description (Comments): Right Groin I&D      Assessments 2025  2:21 PM   Wound Image     Site Assessment Moist;Red   Drainage Amount Moderate   Drainage Description Serosanguineous   Treatments Saline;Cleansed   Dressing Kerlix roll;ABD Pad   Dressing Changed Changed   Wound Length (cm) 0.5 cm   Wound Width (cm) 2.4 cm   Wound Surface Area (cm^2) 0.94 cm^2   Wound Depth (cm) 2.1 cm   Wound Volume (cm^3) 1.319 cm^3   Margins Well-defined edges   Non-staged Wound Description Full thickness   Wound Granulation Tissue Red;Firm   Wound Bed Granulation (%) 75 %   Wound Odor None   Shape 25% adipose       Wound Cleaning and Dressings:  Wound cleansing:  normal saline  Wound cleaning  frequency: Daily  Wound product: wet to dry saline dressing. Use one piece of kerlix moistened with saline to pack lightly into the wound and then cover with abd.   Dressing change frequency:  Change dressing daily and/or PRN    ALL WOUND CARE SUPPLIES CAN BE OBTAINED FROM CENTRAL [facility]RIBUTION     If patient is Diabetic: want to make sure blood sugars are within a controled range for wound healing     Protein intake: depending on providers recommendations and patients kidney functions - if kidneys are good then recommend patient to increase protein intake (Boost, Rafael, Ensure, Premiere Protein)       Additional Notes:  will follow up tomorrow after the OR procedure.     Recommendations: If patient is discharged home, may follow up with home health and/or outpatient wound care clinic.       Thank you for this consultation and for allowing me to participate in the care of your patient.  Please call [phone] if you have any questions about this consultation and plan of care.     Time Spent 30 Minutes.    Thank you,  Raymond Sage RN  Wound/Ostomy/Continence nurse    2025  2:43 PM       [1]   Past Medical History:   Arthritis    Chronic kidney disease (CKD)    Esophageal reflux    High blood pressure    High cholesterol    Hyperlipidemia    HYPERTENSION    Hypertension    Unspecified essential hypertension   [2]   Past Surgical History:  Procedure Laterality Date    Anesth,open heart surgery  2024    Colonoscopy N/A 2018    Procedure: COLONOSCOPY;  Surgeon: Magdy Webber MD;  Location: [facility] ENDOSCOPY    Colonoscopy  2018    Endometrial ablation      child passed away for 5 mins          1    Other surgical history  2011    cysto-Dr. Lacey -- pt denies

## 2025-05-06 NOTE — PLAN OF CARE
Rec'd pt at 0730. A&O x 4. Tele shows NSR. O2 sats adequate on RA, CPAP at night. Pt continent, up w/ 1 and cane at bedside. No C/O pain or SOB at this time, PO pain meds available prn. Skin dry and intact, rt groin wet to dry dressing changed this AM by Dr. Holder. Bed locked and in low position, call light and personal items within reach. Will continue to monitor. POC - Pain control, PT eval, repeat procedure tomorrow w/ Dr. Holder.    1430 -- Wound dressing changed by wound care RN's.     Problem: Patient/Family Goals  Goal: Patient/Family Long Term Goal  Description: Patient's Long Term Goal: Stay healthy  Interventions:- Resume home med regimen, follow up with providers- See additional Care Plan goals for specific interventions  Outcome: Progressing  Goal: Patient/Family Short Term Goal  Description: Patient's Short Term Goal: Discharge from hospital  Interventions: - Post op monitor, wound care to see, IV ancef- See additional Care Plan goals for specific interventions  Outcome: Progressing     Problem: SKIN/TISSUE INTEGRITY - ADULT  Goal: Skin integrity remains intact  Description: INTERVENTIONS- Assess and document risk factors for pressure ulcer development- Assess and document skin integrity- Monitor for areas of redness and/or skin breakdown- Initiate interventions, skin care algorithm/standards of care as needed  Outcome: Progressing  Goal: Incision(s), wounds(s) or drain site(s) healing without S/S of infection  Description: INTERVENTIONS:- Assess and document risk factors for pressure ulcer development- Assess and document skin integrity- Assess and document dressing/incision, wound bed, drain sites and surrounding tissue- Implement wound care per orders- Initiate isolation precautions as appropriate- Initiate Pressure Ulcer prevention bundle as indicated  Outcome: Progressing  Goal: Oral mucous membranes remain intact  Description: INTERVENTIONS- Assess oral mucosa and hygiene practices- Implement  preventative oral hygiene regimen- Implement oral medicated treatments as ordered  Outcome: Progressing     Problem: PAIN - ADULT  Goal: Verbalizes/displays adequate comfort level or patient's stated pain goal  Description: INTERVENTIONS:- Encourage pt to monitor pain and request assistance- Assess pain using appropriate pain scale- Administer analgesics based on type and severity of pain and evaluate response- Implement non-pharmacological measures as appropriate and evaluate response- Consider cultural and social influences on pain and pain management- Manage/alleviate anxiety- Utilize distraction and/or relaxation techniques- Monitor for opioid side effects- Notify MD/LIP if interventions unsuccessful or patient reports new pain- Anticipate increased pain with activity and pre-medicate as appropriate  Outcome: Progressing

## 2025-05-07 ENCOUNTER — APPOINTMENT (OUTPATIENT)
Dept: CARDIAC REHAB | Facility: HOSPITAL | Age: 60
End: 2025-05-07
Attending: INTERNAL MEDICINE
Payer: COMMERCIAL

## 2025-05-07 ENCOUNTER — ANESTHESIA (OUTPATIENT)
Dept: CARDIAC SURGERY | Facility: HOSPITAL | Age: 60
End: 2025-05-07
Payer: COMMERCIAL

## 2025-05-07 LAB — GLUCOSE BLD-MCNC: 107 MG/DL (ref 70–99)

## 2025-05-07 PROCEDURE — 0JDC0ZZ EXTRACTION OF PELVIC REGION SUBCUTANEOUS TISSUE AND FASCIA, OPEN APPROACH: ICD-10-PCS | Performed by: SURGERY

## 2025-05-07 PROCEDURE — 99232 SBSQ HOSP IP/OBS MODERATE 35: CPT | Performed by: INTERNAL MEDICINE

## 2025-05-07 PROCEDURE — 0HRAXK3 REPLACEMENT OF INGUINAL SKIN WITH NONAUTOLOGOUS TISSUE SUBSTITUTE, FULL THICKNESS, EXTERNAL APPROACH: ICD-10-PCS | Performed by: SURGERY

## 2025-05-07 DEVICE — MICROMATRIX® UBM STANDARD PARTICULATE, 200MG
Type: IMPLANTABLE DEVICE | Site: GROIN | Status: FUNCTIONAL
Brand: MICROMATRIX®

## 2025-05-07 DEVICE — MICROMATRIX® UBM STANDARD PARTICULATE, 100MG
Type: IMPLANTABLE DEVICE | Site: GROIN | Status: FUNCTIONAL
Brand: MICROMATRIX®

## 2025-05-07 DEVICE — CYTAL® WOUND MATRIX 2-LAYER, 5CM X 5CM
Type: IMPLANTABLE DEVICE | Site: GROIN | Status: FUNCTIONAL
Brand: CYTAL®

## 2025-05-07 RX ORDER — NICOTINE POLACRILEX 4 MG
15 LOZENGE BUCCAL
Status: DISCONTINUED | OUTPATIENT
Start: 2025-05-07 | End: 2025-05-07 | Stop reason: HOSPADM

## 2025-05-07 RX ORDER — NICOTINE POLACRILEX 4 MG
30 LOZENGE BUCCAL
Status: DISCONTINUED | OUTPATIENT
Start: 2025-05-07 | End: 2025-05-07 | Stop reason: HOSPADM

## 2025-05-07 RX ORDER — NALOXONE HYDROCHLORIDE 0.4 MG/ML
80 INJECTION, SOLUTION INTRAMUSCULAR; INTRAVENOUS; SUBCUTANEOUS AS NEEDED
Status: DISCONTINUED | OUTPATIENT
Start: 2025-05-07 | End: 2025-05-07 | Stop reason: HOSPADM

## 2025-05-07 RX ORDER — HYDROMORPHONE HYDROCHLORIDE 1 MG/ML
0.6 INJECTION, SOLUTION INTRAMUSCULAR; INTRAVENOUS; SUBCUTANEOUS EVERY 5 MIN PRN
Status: DISCONTINUED | OUTPATIENT
Start: 2025-05-07 | End: 2025-05-07 | Stop reason: HOSPADM

## 2025-05-07 RX ORDER — HYDROCODONE BITARTRATE AND ACETAMINOPHEN 5; 325 MG/1; MG/1
1 TABLET ORAL ONCE AS NEEDED
Status: DISCONTINUED | OUTPATIENT
Start: 2025-05-07 | End: 2025-05-07 | Stop reason: HOSPADM

## 2025-05-07 RX ORDER — PROCHLORPERAZINE EDISYLATE 5 MG/ML
5 INJECTION INTRAMUSCULAR; INTRAVENOUS EVERY 8 HOURS PRN
Status: DISCONTINUED | OUTPATIENT
Start: 2025-05-07 | End: 2025-05-07 | Stop reason: HOSPADM

## 2025-05-07 RX ORDER — ACETAMINOPHEN 500 MG
1000 TABLET ORAL ONCE AS NEEDED
Status: DISCONTINUED | OUTPATIENT
Start: 2025-05-07 | End: 2025-05-07 | Stop reason: HOSPADM

## 2025-05-07 RX ORDER — ONDANSETRON 2 MG/ML
INJECTION INTRAMUSCULAR; INTRAVENOUS AS NEEDED
Status: DISCONTINUED | OUTPATIENT
Start: 2025-05-07 | End: 2025-05-07 | Stop reason: SURG

## 2025-05-07 RX ORDER — LABETALOL HYDROCHLORIDE 5 MG/ML
5 INJECTION, SOLUTION INTRAVENOUS EVERY 5 MIN PRN
Status: DISCONTINUED | OUTPATIENT
Start: 2025-05-07 | End: 2025-05-07 | Stop reason: HOSPADM

## 2025-05-07 RX ORDER — LIDOCAINE HYDROCHLORIDE 10 MG/ML
INJECTION, SOLUTION EPIDURAL; INFILTRATION; INTRACAUDAL; PERINEURAL AS NEEDED
Status: DISCONTINUED | OUTPATIENT
Start: 2025-05-07 | End: 2025-05-07 | Stop reason: SURG

## 2025-05-07 RX ORDER — SODIUM CHLORIDE, SODIUM LACTATE, POTASSIUM CHLORIDE, CALCIUM CHLORIDE 600; 310; 30; 20 MG/100ML; MG/100ML; MG/100ML; MG/100ML
INJECTION, SOLUTION INTRAVENOUS CONTINUOUS
Status: DISCONTINUED | OUTPATIENT
Start: 2025-05-07 | End: 2025-05-07 | Stop reason: HOSPADM

## 2025-05-07 RX ORDER — INSULIN ASPART 100 [IU]/ML
INJECTION, SOLUTION INTRAVENOUS; SUBCUTANEOUS ONCE
Status: DISCONTINUED | OUTPATIENT
Start: 2025-05-07 | End: 2025-05-07 | Stop reason: HOSPADM

## 2025-05-07 RX ORDER — HYDROMORPHONE HYDROCHLORIDE 1 MG/ML
0.4 INJECTION, SOLUTION INTRAMUSCULAR; INTRAVENOUS; SUBCUTANEOUS EVERY 5 MIN PRN
Status: DISCONTINUED | OUTPATIENT
Start: 2025-05-07 | End: 2025-05-07 | Stop reason: HOSPADM

## 2025-05-07 RX ORDER — HYDROCODONE BITARTRATE AND ACETAMINOPHEN 5; 325 MG/1; MG/1
2 TABLET ORAL ONCE AS NEEDED
Status: DISCONTINUED | OUTPATIENT
Start: 2025-05-07 | End: 2025-05-07 | Stop reason: HOSPADM

## 2025-05-07 RX ORDER — SODIUM CHLORIDE 9 MG/ML
INJECTION, SOLUTION INTRAVENOUS CONTINUOUS PRN
Status: DISCONTINUED | OUTPATIENT
Start: 2025-05-07 | End: 2025-05-07 | Stop reason: SURG

## 2025-05-07 RX ORDER — MIDAZOLAM HYDROCHLORIDE 1 MG/ML
INJECTION INTRAMUSCULAR; INTRAVENOUS AS NEEDED
Status: DISCONTINUED | OUTPATIENT
Start: 2025-05-07 | End: 2025-05-07 | Stop reason: SURG

## 2025-05-07 RX ORDER — PHENYLEPHRINE HCL 10 MG/ML
VIAL (ML) INJECTION AS NEEDED
Status: DISCONTINUED | OUTPATIENT
Start: 2025-05-07 | End: 2025-05-07 | Stop reason: SURG

## 2025-05-07 RX ORDER — DEXAMETHASONE SODIUM PHOSPHATE 4 MG/ML
VIAL (ML) INJECTION AS NEEDED
Status: DISCONTINUED | OUTPATIENT
Start: 2025-05-07 | End: 2025-05-07 | Stop reason: SURG

## 2025-05-07 RX ORDER — DEXTROSE MONOHYDRATE 25 G/50ML
50 INJECTION, SOLUTION INTRAVENOUS
Status: DISCONTINUED | OUTPATIENT
Start: 2025-05-07 | End: 2025-05-07 | Stop reason: HOSPADM

## 2025-05-07 RX ORDER — ONDANSETRON 2 MG/ML
4 INJECTION INTRAMUSCULAR; INTRAVENOUS EVERY 6 HOURS PRN
Status: DISCONTINUED | OUTPATIENT
Start: 2025-05-07 | End: 2025-05-07 | Stop reason: HOSPADM

## 2025-05-07 RX ORDER — HYDROMORPHONE HYDROCHLORIDE 1 MG/ML
0.2 INJECTION, SOLUTION INTRAMUSCULAR; INTRAVENOUS; SUBCUTANEOUS EVERY 5 MIN PRN
Status: DISCONTINUED | OUTPATIENT
Start: 2025-05-07 | End: 2025-05-07 | Stop reason: HOSPADM

## 2025-05-07 RX ADMIN — PHENYLEPHRINE HCL 100 MCG: 10 MG/ML VIAL (ML) INJECTION at 14:15:00

## 2025-05-07 RX ADMIN — SODIUM CHLORIDE: 9 INJECTION, SOLUTION INTRAVENOUS at 14:48:00

## 2025-05-07 RX ADMIN — PHENYLEPHRINE HCL 100 MCG: 10 MG/ML VIAL (ML) INJECTION at 14:25:00

## 2025-05-07 RX ADMIN — MIDAZOLAM HYDROCHLORIDE 2 MG: 1 INJECTION INTRAMUSCULAR; INTRAVENOUS at 13:57:00

## 2025-05-07 RX ADMIN — LIDOCAINE HYDROCHLORIDE 50 MG: 10 INJECTION, SOLUTION EPIDURAL; INFILTRATION; INTRACAUDAL; PERINEURAL at 14:02:00

## 2025-05-07 RX ADMIN — PHENYLEPHRINE HCL 100 MCG: 10 MG/ML VIAL (ML) INJECTION at 14:02:00

## 2025-05-07 RX ADMIN — SODIUM CHLORIDE: 9 INJECTION, SOLUTION INTRAVENOUS at 13:56:00

## 2025-05-07 RX ADMIN — ONDANSETRON 4 MG: 2 INJECTION INTRAMUSCULAR; INTRAVENOUS at 14:25:00

## 2025-05-07 RX ADMIN — DEXAMETHASONE SODIUM PHOSPHATE 4 MG: 4 MG/ML VIAL (ML) INJECTION at 14:05:00

## 2025-05-07 NOTE — PLAN OF CARE
Assumed care of patient @ 1930    Patient alert and oriented x4. On RA during the day, CPAP at night. Continent bowel and bladder. C/o stiff neck, headache-- norco PRN. Up x1 with cane. Call light within reach. Fall precautions in place. Makes needs known    Plan  Repeat debridement 5/7?  Wound care    Problem: Patient/Family Goals  Goal: Patient/Family Long Term Goal  Description: Patient's Long Term Goal: Stay healthy  Interventions:- Resume home med regimen, follow up with providers- See additional Care Plan goals for specific interventions  Outcome: Progressing  Goal: Patient/Family Short Term Goal  Description: Patient's Short Term Goal: Discharge from hospital  Interventions: - Post op monitor, wound care to see, IV ancef- See additional Care Plan goals for specific interventions  Outcome: Progressing     Problem: SKIN/TISSUE INTEGRITY - ADULT  Goal: Skin integrity remains intact  Description: INTERVENTIONS- Assess and document risk factors for pressure ulcer development- Assess and document skin integrity- Monitor for areas of redness and/or skin breakdown- Initiate interventions, skin care algorithm/standards of care as needed  Outcome: Progressing  Goal: Incision(s), wounds(s) or drain site(s) healing without S/S of infection  Description: INTERVENTIONS:- Assess and document risk factors for pressure ulcer development- Assess and document skin integrity- Assess and document dressing/incision, wound bed, drain sites and surrounding tissue- Implement wound care per orders- Initiate isolation precautions as appropriate- Initiate Pressure Ulcer prevention bundle as indicated  Outcome: Progressing  Goal: Oral mucous membranes remain intact  Description: INTERVENTIONS- Assess oral mucosa and hygiene practices- Implement preventative oral hygiene regimen- Implement oral medicated treatments as ordered  Outcome: Progressing     Problem: PAIN - ADULT  Goal: Verbalizes/displays adequate comfort level or patient's  stated pain goal  Description: INTERVENTIONS:- Encourage pt to monitor pain and request assistance- Assess pain using appropriate pain scale- Administer analgesics based on type and severity of pain and evaluate response- Implement non-pharmacological measures as appropriate and evaluate response- Consider cultural and social influences on pain and pain management- Manage/alleviate anxiety- Utilize distraction and/or relaxation techniques- Monitor for opioid side effects- Notify MD/LIP if interventions unsuccessful or patient reports new pain- Anticipate increased pain with activity and pre-medicate as appropriate  Outcome: Progressing

## 2025-05-07 NOTE — BRIEF OP NOTE
Pre-Operative Diagnosis: Right groin wound status post incision and debridement     Post-Operative Diagnosis: Right groin wound status post incision and debridement     Procedure Performed:   Non-excisional debridement of right groin wound 3x3x2 cm  Placement of integra micro and cytal graft    Surgeons and Role:     * Frank Holder MD - Primary    Assistant(s):  Surgical Assistant.: Yaw Rojas, RSA     Surgical Findings: healthy tissue without infection     Specimen: none     Estimated Blood Loss: 50 mL     Frank Holder MD  5/7/2025  2:31 PM

## 2025-05-07 NOTE — ANESTHESIA PROCEDURE NOTES
Airway  Date/Time: 5/7/2025 2:03 PM  Reason: elective    Airway not difficult    General Information and Staff   Patient location during procedure: OR  Anesthesiologist: Jose Roberto Mata MD  Performed: anesthesiologist   Performed by: Jose Roberto Mata MD  Authorized by: Jose Roberto Mata MD        Indications and Patient Condition  Indications for airway management: anesthesia  Sedation level: deep      Preoxygenated: yesPatient position: sniffing    Mask difficulty assessment: 1 - vent by mask    Final Airway Details    Final airway type: supraglottic airway      Successful airway: classic  Size: 4     Number of attempts at approach: 1  Number of other approaches attempted: 0

## 2025-05-07 NOTE — ANESTHESIA PREPROCEDURE EVALUATION
PRE-OP EVALUATION    Patient Name: Alisha Wilde    Admit Diagnosis: WOUND OF RIGHT GROIN, INITIAL ENCOUNTER  Abscess of left groin    Pre-op Diagnosis: RIGHT GROIN WOUND    RIGHT GROIN WOUND INCISION AND DRAINAGE    Anesthesia Procedure: RIGHT GROIN WOUND INCISION AND DRAINAGE (Right)    Surgeons and Role:     * Frank Holder MD - Primary    Pre-op vitals reviewed.  Temp: 98.8 °F (37.1 °C)  Pulse: 103  Resp: 22  BP: 122/73  SpO2: 98 %  Body mass index is 36.14 kg/m².    Current medications reviewed.  Hospital Medications:  Current Medications[1]    Outpatient Medications:   Prescriptions Prior to Admission[2]    Allergies: Atorvastatin, Pravastatin, Rosuvastatin, and Seasonal      Anesthesia Evaluation    Patient summary reviewed.    Anesthetic Complications  (-) history of anesthetic complications         GI/Hepatic/Renal      (+) GERD       (+) chronic renal disease and CRI                   Cardiovascular        Exercise tolerance: good     MET: >4    (+) obesity  (+) hypertension   (+) hyperlipidemia               (+) dysrhythmias                   Endo/Other      (+) diabetes  type 2, not using insulin                         Pulmonary    Negative pulmonary ROS.                       Neuro/Psych    Negative neuro/psych ROS.                                  Past Surgical History[3]  Social Hx on file[4]  History   Drug Use No     Available pre-op labs reviewed.  Lab Results   Component Value Date    WBC 5.4 05/06/2025    RBC 3.43 (L) 05/06/2025    HGB 8.4 (L) 05/06/2025    HCT 27.1 (L) 05/06/2025    MCV 79.0 (L) 05/06/2025    MCH 24.5 (L) 05/06/2025    MCHC 31.0 05/06/2025    RDW 17.7 05/06/2025    .0 05/06/2025     Lab Results   Component Value Date     05/06/2025    K 3.7 05/06/2025     05/06/2025    CO2 22.0 05/06/2025    BUN 12 05/06/2025    CREATSERUM 1.53 (H) 05/06/2025     (H) 05/06/2025    CA 9.4 05/06/2025            Airway      Mallampati: III  Mouth opening: >3 FB  TM  distance: > 6 cm  Neck ROM: full Cardiovascular    Cardiovascular exam normal.  Rhythm: regular  Rate: normal     Dental    Dentition appears grossly intact         Pulmonary    Pulmonary exam normal.  Breath sounds clear to auscultation bilaterally.               Other findings              ASA: 3   Plan: general  NPO status verified and patient meets guidelines.  Patient has not taken beta blockers in last 24 hours.  Post-procedure pain management plan discussed with surgeon and patient.    Comment: I spoke with the patient and discussed the risks of general anesthesia, which include nausea and vomiting; sore throat; injury to the lips, gums, teeth, and eyes; cardiac, pulmonary, and neurologic events; aspiration; and allergic reactions. The patient understands these risks and consents to receiving general anesthesia for this procedure.  Plan/risks discussed with: patient                Present on Admission:  **None**             [1]    [Transfer Hold] fluticasone propionate (Flonase) 50 MCG/ACT nasal suspension 1 spray  1 spray Each Nare Daily    [Transfer Hold] amLODIPine (Norvasc) tab 10 mg  10 mg Oral Daily    [Transfer Hold] docusate sodium (Colace) cap 100 mg  100 mg Oral BID PRN    [Transfer Hold] ezetimibe (Zetia) tab 10 mg  10 mg Oral Daily    [Transfer Hold] pantoprazole (Protonix) DR tab 40 mg  40 mg Oral QAM AC    [Transfer Hold] aspirin DR tab 81 mg  81 mg Oral Daily    [Transfer Hold] hydroCHLOROthiazide tab 12.5 mg  12.5 mg Oral Daily    [Transfer Hold] hydrALAZINE (Apresoline) tab 100 mg  100 mg Oral TID    [Transfer Hold] cloNIDine (Catapres) partial tab 0.05 mg  0.05 mg Oral Daily    [Transfer Hold] clopidogrel (Plavix) tab 75 mg  75 mg Oral Daily    [Transfer Hold] furosemide (Lasix) tab 40 mg  40 mg Oral Daily    [Transfer Hold] acetaminophen (Tylenol) tab 650 mg  650 mg Oral Q4H PRN    Or    [Transfer Hold] HYDROcodone-acetaminophen (Norco) 5-325 MG per tab 1 tablet  1 tablet Oral Q4H PRN    Or     [Transfer Hold] HYDROcodone-acetaminophen (Norco) 5-325 MG per tab 2 tablet  2 tablet Oral Q4H PRN    [Transfer Hold] ondansetron (Zofran) 4 MG/2ML injection 4 mg  4 mg Intravenous Q6H PRN    [Transfer Hold] prochlorperazine (Compazine) 10 MG/2ML injection 5 mg  5 mg Intravenous Q8H PRN    [COMPLETED] ceFAZolin (Ancef) 2g in 10mL IV syringe premix  2 g Intravenous Q8H    [Transfer Hold] thiamine (Vitamin B1) tab 100 mg  100 mg Oral Daily    [Transfer Hold] mirtazapine (Remeron) tab 7.5 mg  7.5 mg Per G Tube Nightly PRN    [Transfer Hold] cetirizine (ZyrTEC) tab 5 mg  5 mg Oral Nightly    [Transfer Hold] ferrous sulfate DR tab 325 mg  325 mg Oral Daily with breakfast    [Transfer Hold] melatonin tab 3 mg  3 mg Oral Nightly PRN    [Transfer Hold] glycerin-hypromellose- (Artificial Tears) 0.2-0.2-1 % ophthalmic solution 1 drop  1 drop Both Eyes QID PRN    [Transfer Hold] sodium chloride (Saline Mist) 0.65 % nasal solution 1 spray  1 spray Each Nare Q3H PRN    [Transfer Hold] polyethylene glycol (PEG 3350) (Miralax) 17 g oral packet 17 g  17 g Oral Daily PRN   [2]   Medications Prior to Admission   Medication Sig Dispense Refill Last Dose/Taking    furosemide 40 MG Oral Tab Take 1 tablet (40 mg total) by mouth daily.   5/4/2025    clopidogrel 75 MG Oral Tab Take 1 tablet (75 mg total) by mouth daily.   5/5/2025 Morning    thiamine 100 MG Oral Tab TAKE 1 TABLET DAILY WITH FEEDING TUBE (Patient taking differently: Take 1 tablet (100 mg total) by mouth daily.) 90 tablet 1 Taking Differently    cloNIDine 0.1 MG Oral Tab Take 0.5 tablets (0.05 mg total) by mouth daily.   5/5/2025 Morning    metoprolol succinate ER 25 MG Oral Tablet 24 Hr Take 1 tablet (25 mg total) by mouth daily.   5/5/2025 Morning    levocetirizine 5 MG Oral Tab Take 1 tablet (5 mg total) by mouth every evening. 30 tablet 1 5/4/2025    amoxicillin 875 MG Oral Tab Take 1 tablet (875 mg total) by mouth 2 (two) times daily for 10 days. 20 tablet 0  5/4/2025    hydroCHLOROthiazide 12.5 MG Oral Tab Take 1 tablet (12.5 mg total) by mouth daily. 30 tablet 2 5/5/2025 Morning    hydrALAZINE 100 MG Oral Tab Take 1 tablet (100 mg total) by mouth in the morning, at noon, and at bedtime. 90 tablet 2 5/5/2025 Morning    aspirin 81 MG Oral Tab EC Take 1 tablet (81 mg total) by mouth daily. 30 tablet 0 5/5/2025 Morning    acetaminophen 500 MG Oral Tab Take 2 tablets (1,000 mg total) by mouth every 6 (six) hours as needed for Pain.   Taking As Needed    ezetimibe 10 MG Oral Tab Take 1 tablet (10 mg total) by mouth in the morning.   5/4/2025    pantoprazole 40 MG Oral Tab EC Take 1 tablet (40 mg total) by mouth every morning before breakfast. 90 tablet 3 Taking    bisacodyl 5 MG Oral Tab EC Take 1 tablet (5 mg total) by mouth daily as needed. 30 tablet 0 Taking As Needed    docusate sodium (COLACE) 100 MG Oral Cap Take 1 capsule (100 mg total) by mouth 2 (two) times daily as needed for constipation. 180 capsule 1 Taking As Needed    Ferrous Gluconate 324 (37.5 Fe) MG Oral Tab Take 1 tablet (324 mg total) by mouth daily. 90 tablet 1 5/4/2025    azelastine 0.1 % Nasal Solution 1 spray by Nasal route 2 (two) times daily. (Patient taking differently: 1 spray by Nasal route daily as needed for Rhinitis.) 3 each 1 Taking Differently    mirtazapine 7.5 MG Oral Tab 1 tablet (7.5 mg total) by Per G Tube route nightly. (Patient taking differently: 1 tablet (7.5 mg total) by Per G Tube route as needed.) 30 tablet 0 Taking Differently    amLODIPine 10 MG Oral Tab Take 1 tablet (10 mg total) by mouth daily. 90 tablet 3 5/5/2025 Morning    sulfamethoxazole-trimethoprim -160 MG Oral Tab per tablet Take 1 tablet by mouth 2 (two) times daily.      [3]   Past Surgical History:  Procedure Laterality Date    Anesth,open heart surgery  11/23/2024    Colonoscopy N/A 06/02/2018    Procedure: COLONOSCOPY;  Surgeon: Magdy Webber MD;  Location: [facility] ENDOSCOPY    Colonoscopy  6/2/2018     Endometrial ablation      child passed away for 5 mins          1    Other surgical history  2011    cysto-Dr. Lacey -- pt denies   [4]   Social History  Socioeconomic History    Marital status: Single   Tobacco Use    Smoking status: Former     Current packs/day: 0.00     Average packs/day: 1 pack/day for 13.0 years (13.0 ttl pk-yrs)     Types: Cigarettes     Start date:      Quit date:      Years since quittin.3    Smokeless tobacco: Former   Vaping Use    Vaping status: Never Used   Substance and Sexual Activity    Alcohol use: Not Currently     Alcohol/week: 1.0 - 2.0 standard drink of alcohol     Types: 1 - 2 Cans of beer per week     Comment: every day  NOT DRINKING FOR LAS 4 MONTHS    Drug use: No

## 2025-05-07 NOTE — PLAN OF CARE
Reviewed Dr. Mosher's documentation. Office will call to schedule outpatient EP appt and MCT placement. Please contact us with further concerns. We will follow peripherally from a cardiac perspective.

## 2025-05-07 NOTE — PROGRESS NOTES
[facility]   part of [facility]     Hospitalist Progress Note     Alisha Wilde Patient Status:  Inpatient    10/25/1965 MRN [id number]   Location [facility] 2NE-A Attending Frank Holder MD   Hosp Day # 2 PCP Bridger Avendano MD     Chief Complaint: med management    Subjective:     Patient without acute events overnight. Pain controlled. No F/C. No CP or SOB. Plan for OR later today.     Objective:    Review of Systems:   A comprehensive review of systems was completed; pertinent positive and negatives stated in subjective.    Vital signs:  Temp:  [98 °F (36.7 °C)-98.6 °F (37 °C)] 98 °F (36.7 °C)  Pulse:  [78-97] 84  Resp:  [18-26] 22  BP: (112-142)/(67-82) 124/72  SpO2:  [95 %-100 %] 100 %    Physical Exam:    General: No acute distress  Respiratory: No wheezes, no rhonchi  Cardiovascular: S1, S2, regular rate and rhythm  Abdomen: Soft, Non-tender, non-distended, positive bowel sounds  Neuro: No new focal deficits.   Extremities: No edema      Diagnostic Data:    Labs:  Recent Labs   Lab 25  0719   WBC 5.4   HGB 8.4*   MCV 79.0*   .0       Recent Labs   Lab 25  0719   *   BUN 12   CREATSERUM 1.53*   CA 9.4      K 3.7      CO2 22.0       Estimated Glomerular Filtration Rate: 38 mL/min/1.73m2 (A) (result from lab).    No results for input(s): \"TROP\", \"TROPHS\", \"CK\" in the last 168 hours.    No results for input(s): \"PTP\", \"INR\" in the last 168 hours.               Microbiology    Hospital Encounter on 25   1. Tissue Aerobic Culture     Status: None (Preliminary result)    Collection Time: 25  6:57 AM    Specimen: Tissue   Result Value Ref Range    Tissue Culture Result No Growth 2 Days N/A    Tissue Smear No WBCs seen N/A    Tissue Smear No organisms seen N/A         Imaging: Reviewed in Epic.    Medications:    fluticasone propionate  1 spray Each Nare Daily    amLODIPine  10 mg Oral Daily    ezetimibe  10 mg Oral Daily    pantoprazole  40 mg Oral  QAM AC    aspirin  81 mg Oral Daily    hydroCHLOROthiazide  12.5 mg Oral Daily    hydrALAZINE  100 mg Oral TID    cloNIDine  0.05 mg Oral Daily    clopidogrel  75 mg Oral Daily    furosemide  40 mg Oral Daily    thiamine  100 mg Oral Daily    cetirizine  5 mg Oral Nightly    ferrous sulfate  325 mg Oral Daily with breakfast       Assessment & Plan:      #Right Groin superficial abscess  S/p I&D with Dr. Holder, plan for repeat today  F/u OR cultures  SCD for dvt proph     #Hx aortic arch dissection with aneurysm  #Prior Type A aortic dissection   #Hx left renal artery dissection   #Essential HTN  ASA, Plavix  Amlodipine, Clonidine, Lasix, Hydralazine, hydrochlorothiazide    #Heart Block  On tele  Cardiology following  Stop BB  Plan TTM on DC     #CKD 3A  Monitor BMP     #TANYA     #GERD  PPI     #HLD  Rahtia          Sumit Kapoor MD    Supplementary Documentation:     Quality:  DVT Mechanical Prophylaxis:   SCDs,    DVT Pharmacologic Prophylaxis   Medication   None         DVT Pharmacologic prophylaxis: Aspirin 81 mg      Code Status: Full Code  Webb: External urinary catheter in place  Webb Duration (in days):   Central line:    BRANDON:     Discharge is dependent on: progress  At this point Ms. Wilde is expected to be discharge to: home    The 21st Century Cures Act makes medical notes like these available to patients in the interest of transparency. Please be advised this is a medical document. Medical documents are intended to carry relevant information, facts as evident, and the clinical opinion of the practitioner. The medical note is intended as peer to peer communication and may appear blunt or direct. It is written in medical language and may contain abbreviations or verbiage that are unfamiliar.

## 2025-05-07 NOTE — ANESTHESIA POSTPROCEDURE EVALUATION
[place]    Alisha Wilde Patient Status:  Inpatient   Age/Gender 59 year old female MRN [id number]   Location [facility] POST ANESTHESIA CARE UNIT Attending Frank Holder MD   Hosp Day # 2 PCP Bridger Avendano MD       Anesthesia Post-op Note    RIGHT GROIN WOUND INCISION AND DRAINAGE WITH WOUND VAC    Procedure Summary       Date: 05/07/25 Room / Location:  [facility] 01 /  [facility]    Anesthesia Start: 1356 Anesthesia Stop: 1448    Procedure: RIGHT GROIN WOUND INCISION AND DRAINAGE WITH WOUND VAC (Right: Groin) Diagnosis: (RIGHT GROIN WOUND)    Surgeons: Frank Holder MD Anesthesiologist: Jose Roberto Mata MD    Anesthesia Type: general ASA Status: 3            Anesthesia Type: general    Vitals Value Taken Time   /71 05/07/25 14:49   Temp 98.7 °F (37.1 °C) 05/07/25 14:49   Pulse 86 05/07/25 14:49   Resp 16 05/07/25 14:49   SpO2 100 % 05/07/25 14:49   Vitals shown include unfiled device data.        Patient Location: PACU    Anesthesia Type: general    Airway Patency: patent and extubated    Postop Pain Control: adequate    Mental Status: mildly sedated but able to meaningfully participate in the post-anesthesia evaluation    Nausea/Vomiting: none    Cardiopulmonary/Hydration status: stable euvolemic    Complications: no apparent anesthesia related complications    Postop vital signs: stable    Dental Exam: Unchanged from Preop    Patient to be discharged from PACU when criteria met.          
Ambulance

## 2025-05-07 NOTE — PLAN OF CARE
Rec'd pt at 0730. A&O x 4. Tele shows NSR. O2 sats adequate on RA. Pt continent, up w/ 1 and walker. C diff isolation ordered d/t loose stool yesterday -- this RN confirmed w/ infection control ok to dc order d/t pt taking a laxative 5/5. No C/O pain or SOB at this time. Rt groin dressing CDI. Bed locked and in low position, call light and personal items within reach. Will continue to monitor. POC - Repeat I&D procedure w/ Dr. Holder today.    1115 -- Consent signed by patient for procedure today.  1600 -- Rec'd pt back from CV[facility] s/p I&D of rt groin wound, wound vac placed. Recovered per protocol w/ frequent site checks and vital signs, rt groin dressing CDI. VSS, on room air, no c/o pain.    Problem: Patient/Family Goals  Goal: Patient/Family Long Term Goal  Description: Patient's Long Term Goal: Stay healthy  Interventions:- Resume home med regimen, follow up with providers- See additional Care Plan goals for specific interventions  Outcome: Progressing  Goal: Patient/Family Short Term Goal  Description: Patient's Short Term Goal: Discharge from hospital  Interventions: - Post op monitor, wound care to see, IV ancef- See additional Care Plan goals for specific interventions  Outcome: Progressing     Problem: SKIN/TISSUE INTEGRITY - ADULT  Goal: Skin integrity remains intact  Description: INTERVENTIONS- Assess and document risk factors for pressure ulcer development- Assess and document skin integrity- Monitor for areas of redness and/or skin breakdown- Initiate interventions, skin care algorithm/standards of care as needed  Outcome: Progressing  Goal: Incision(s), wounds(s) or drain site(s) healing without S/S of infection  Description: INTERVENTIONS:- Assess and document risk factors for pressure ulcer development- Assess and document skin integrity- Assess and document dressing/incision, wound bed, drain sites and surrounding tissue- Implement wound care per orders- Initiate isolation precautions as appropriate-  Initiate Pressure Ulcer prevention bundle as indicated  Outcome: Progressing  Goal: Oral mucous membranes remain intact  Description: INTERVENTIONS- Assess oral mucosa and hygiene practices- Implement preventative oral hygiene regimen- Implement oral medicated treatments as ordered  Outcome: Progressing     Problem: PAIN - ADULT  Goal: Verbalizes/displays adequate comfort level or patient's stated pain goal  Description: INTERVENTIONS:- Encourage pt to monitor pain and request assistance- Assess pain using appropriate pain scale- Administer analgesics based on type and severity of pain and evaluate response- Implement non-pharmacological measures as appropriate and evaluate response- Consider cultural and social influences on pain and pain management- Manage/alleviate anxiety- Utilize distraction and/or relaxation techniques- Monitor for opioid side effects- Notify MD/LIP if interventions unsuccessful or patient reports new pain- Anticipate increased pain with activity and pre-medicate as appropriate  Outcome: Progressing

## 2025-05-07 NOTE — PROGRESS NOTES
Cardiology Progress Note        Alisha Wilde Patient Status:  Inpatient    10/25/1965 MRN [id number]   [facility] 2NE-A Attending Frank Holder MD   Hosp Day # 2 PCP Bridger Avendano MD     Subjective:  Tele negative overnight.  No cardiac issues reported.    Medications:  Scheduled Medications[1]    Continuous Infusions:  Medication Infusions[2]      Allergies:  Allergies[3]      Intake/Output:    Intake/Output Summary (Last 24 hours) at 2025 0745  Last data filed at 2025 0652  Gross per 24 hour   Intake 480 ml   Output 275 ml   Net 205 ml           Last 3 Weights   25 0423 217 lb 2.5 oz (98.5 kg)   25 0600 211 lb 6.7 oz (95.9 kg)   25 1328 210 lb (95.3 kg)   25 1630 210 lb (95.3 kg)   04/15/25 0633 213 lb 6.5 oz (96.8 kg)   25 0611 215 lb 8 oz (97.8 kg)   25 0622 216 lb 11.4 oz (98.3 kg)   25 0500 218 lb 4.8 oz (99 kg)   25 0500 209 lb 9.6 oz (95.1 kg)   25 0545 208 lb 15.9 oz (94.8 kg)   04/10/25 0530 227 lb 11.2 oz (103.3 kg)   25 0030 221 lb 12.5 oz (100.6 kg)   25 0300 233 lb 4 oz (105.8 kg)   25 0500 236 lb 5.3 oz (107.2 kg)   25 0400 246 lb 14.6 oz (112 kg)   25 0300 250 lb (113.4 kg)   25 0400 227 lb 11.8 oz (103.3 kg)   25 1654 222 lb 3.6 oz (100.8 kg)   25 1508 222 lb 3.6 oz (100.8 kg)            Physical Exam:    Temp:  [98.2 °F (36.8 °C)-98.6 °F (37 °C)] 98.3 °F (36.8 °C)  Pulse:  [78-97] 78  Resp:  [18-26] 24  BP: (112-142)/(67-82) 142/82  SpO2:  [93 %-100 %] 100 %    General: No apparent distress  HEENT: No focal deficits.  Neck: supple. JVP normal  Cardiac: Regular rhythm, S1, S2 normal,  rub or gallop.  No murmur.  Lungs: CTA  Abdomen: Soft, non-tender.   Extremities: No edema  Neurologic: no focal deficits  Skin: Warm and dry.     Telemetry: sinus    EKG:      Echo:      Cardiac Cath:      Labs:  HEM:  Recent Labs   Lab 25  0719   WBC 5.4   HGB 8.4*   .0        Chem:  Recent Labs   Lab 05/06/25  0719      K 3.7      CO2 22.0   BUN 12   CREATSERUM 1.53*   CA 9.4   MG 2.1   *       No results for input(s): \"ALT\", \"AST\", \"ALB\", \"AMYLASE\", \"LIPASE\", \"LDH\" in the last 168 hours.    Invalid input(s): \"ALPHOS\", \"TBIL\", \"DBIL\", \"TPROT\"    No results for input(s): \"TROP\", \"CK\" in the last 168 hours.    No results for input(s): \"PTP\", \"INR\" in the last 168 hours.              Impression:  Nocturnal complete AVB with pauses, max 3 seconds last night.  No unexpected presyncope or syncope while awake.  No issues last night.  Preserved EF April 2025.  Type A dissection with surgical repair, trach, PEG Nov 2024.  Type B dissection 3/30/25, stent grafting.  S/p excision of groin abscess on 5/5/25.  May need further debridement.  HTN  TANYA, on CPAP  CRI          Plan:  Metoprolol stopped.  Cardiac status remains stable.  Await plan per vascular.  Outpatient 2 week TTM.        Angelo Mosher MD  5/7/2025  7:45 AM  L2         [1]    fluticasone propionate  1 spray Each Nare Daily    amLODIPine  10 mg Oral Daily    ezetimibe  10 mg Oral Daily    pantoprazole  40 mg Oral QAM AC    aspirin  81 mg Oral Daily    hydroCHLOROthiazide  12.5 mg Oral Daily    hydrALAZINE  100 mg Oral TID    cloNIDine  0.05 mg Oral Daily    clopidogrel  75 mg Oral Daily    furosemide  40 mg Oral Daily    thiamine  100 mg Oral Daily    cetirizine  5 mg Oral Nightly    ferrous sulfate  325 mg Oral Daily with breakfast   [2] [3]   Allergies  Allergen Reactions    Atorvastatin MYALGIA    Pravastatin MYALGIA    Rosuvastatin MYALGIA    Seasonal Runny nose

## 2025-05-08 PROCEDURE — 99232 SBSQ HOSP IP/OBS MODERATE 35: CPT | Performed by: INTERNAL MEDICINE

## 2025-05-08 RX ORDER — POLYETHYLENE GLYCOL 3350 17 G/17G
17 POWDER, FOR SOLUTION ORAL DAILY PRN
Status: DISCONTINUED | OUTPATIENT
Start: 2025-05-08 | End: 2025-05-10

## 2025-05-08 RX ORDER — BISACODYL 10 MG
10 SUPPOSITORY, RECTAL RECTAL
Status: DISCONTINUED | OUTPATIENT
Start: 2025-05-08 | End: 2025-05-10

## 2025-05-08 RX ORDER — SODIUM PHOSPHATE, DIBASIC AND SODIUM PHOSPHATE, MONOBASIC 7; 19 G/230ML; G/230ML
1 ENEMA RECTAL ONCE AS NEEDED
Status: DISCONTINUED | OUTPATIENT
Start: 2025-05-08 | End: 2025-05-10

## 2025-05-08 RX ORDER — BISACODYL 5 MG/1
5 TABLET, DELAYED RELEASE ORAL
Status: DISCONTINUED | OUTPATIENT
Start: 2025-05-08 | End: 2025-05-10

## 2025-05-08 RX ORDER — DOCUSATE SODIUM 100 MG/1
100 CAPSULE, LIQUID FILLED ORAL 2 TIMES DAILY
Status: DISCONTINUED | OUTPATIENT
Start: 2025-05-08 | End: 2025-05-10

## 2025-05-08 NOTE — PLAN OF CARE
Pt chief complaint upon admission: I&D of R groin abscess. Received pt at 1930 during handoff, wound vac in place.    A&Ox 4, pleasant. Strength: some generalized weakness but moves all extremities. RA & CPAP at night, lungs sound clear and diminished. NSR/ST on tele, normotensive. Mild pain. Declined pain medication.    GI/: Purewick in place d/t wound vac and per pt request.  Activity: Pt in bed overnight, fatigued.  LDA: PIV. Wound vac on uninterrupted overnight. Suction at 125. Minimal drainage, <10 ml, serosanguinous.  Skin: CDI. R groin site w/ wound vac, wound covered.  Incision: RIOS.    All medications given as ordered. Pt resting in bed w/ all safety measures in place and call light within reach. Patient updated on plan of care, all questions answered.    Plan is for wound vac management. Follow cultures from OR.    Problem: Patient/Family Goals  Goal: Patient/Family Long Term Goal  Description: Patient's Long Term Goal: Stay healthy  Interventions:- Resume home med regimen, follow up with providers- See additional Care Plan goals for specific interventions  Outcome: Progressing  Goal: Patient/Family Short Term Goal  Description: Patient's Short Term Goal: Discharge from hospital  Interventions: - Post op monitor, wound care to see, IV ancef- See additional Care Plan goals for specific interventions  Outcome: Progressing     Problem: SKIN/TISSUE INTEGRITY - ADULT  Goal: Skin integrity remains intact  Description: INTERVENTIONS- Assess and document risk factors for pressure ulcer development- Assess and document skin integrity- Monitor for areas of redness and/or skin breakdown- Initiate interventions, skin care algorithm/standards of care as needed  Outcome: Progressing  Goal: Incision(s), wounds(s) or drain site(s) healing without S/S of infection  Description: INTERVENTIONS:- Assess and document risk factors for pressure ulcer development- Assess and document skin integrity- Assess and document  dressing/incision, wound bed, drain sites and surrounding tissue- Implement wound care per orders- Initiate isolation precautions as appropriate- Initiate Pressure Ulcer prevention bundle as indicated  Outcome: Progressing  Goal: Oral mucous membranes remain intact  Description: INTERVENTIONS- Assess oral mucosa and hygiene practices- Implement preventative oral hygiene regimen- Implement oral medicated treatments as ordered  Outcome: Progressing     Problem: PAIN - ADULT  Goal: Verbalizes/displays adequate comfort level or patient's stated pain goal  Description: INTERVENTIONS:- Encourage pt to monitor pain and request assistance- Assess pain using appropriate pain scale- Administer analgesics based on type and severity of pain and evaluate response- Implement non-pharmacological measures as appropriate and evaluate response- Consider cultural and social influences on pain and pain management- Manage/alleviate anxiety- Utilize distraction and/or relaxation techniques- Monitor for opioid side effects- Notify MD/LIP if interventions unsuccessful or patient reports new pain- Anticipate increased pain with activity and pre-medicate as appropriate  Outcome: Progressing

## 2025-05-08 NOTE — CM/SW NOTE
SW noted and acknowledged order for a home wound vac.    RENALDO sent referral to [facility] via [employer]. Awaiting responses.       to remain available for support and/or discharge planning.    LETICIA Echevarria  Discharge Planner  [phone]

## 2025-05-08 NOTE — CONSULTS
[facility]  Inpatient Wound Care Contact Note    Alisha Wilde Patient Status:  Inpatient    10/25/1965 MRN [id number]   Location [facility] 2NE-A Attending Frank Holder MD   Hosp Day # 3 PCP Bridger Avendano MD     Patient went to OR on 25 with Dr. Holder for I&D of the R groin and placement of Integra.      We will need new orders to see post operative and Dr. Holder aware to re-consult Inpatient Wound Care, if any wound care is warranted in the future.       Yuly Uribe, PT, MPT  [facility]  [address] [address] [phone]

## 2025-05-08 NOTE — PAYOR COMM NOTE
ADMISSION REVIEW     Payor: [employer] CHOICE/HMO/POS/EPO  Subscriber #:  [id number]  Authorization Number: [id number]    Admit date: 5/5/25  Admit time:  5:25 AM       REVIEW DOCUMENTATION:  ED Provider Notes    No notes of this type exist for this encounter.           5/5       VASCULAR:     Brief Op note     Brief Op Note     Signed     Date of Service: 5/5/2025  7:20 AM     Signed         Pre-Operative Diagnosis: Superficial abscess of right groin post percutaneous procedure     Post-Operative Diagnosis: Superficial abscess of right groin post percutaneous procedure     Procedure Performed:   Incision and drainage of superficial abscess   Sharp excisional debridement of small phlegmon - total area 2x2x2 cm      Surgeons and Role:     * Frank Holder MD - Primary     Assistant(s):        Surgical Findings:   No exposed artery   Excised abscess and phlegmon cavity     Specimen: abscess and phlegmon cavity     Estimated Blood Loss: 10 mL      Frank Holder MD                  HOSPITALIST:     CONSULT     Subjective:  History of Present Illness:      Alisha Wilde is a 59 year old female with a PMH of HTN, HLD, CKD who presents for a surgical procedure.    Patient is doing well, pain is controlled post op.  She denies n/v.  No fever, chills, no cp or sob.  No other acute complaints.     Objective:  Physical Exam:    /70 (BP Location: Left arm)   Pulse 83   Temp 98.5 °F (36.9 °C) (Oral)   Resp 20   Ht 5' 5\" (1.651 m)   Wt 211 lb 6.7 oz (95.9 kg)   SpO2 100%   BMI 35.18 kg/m²   General: No acute distress, Alert  Respiratory: No rhonchi, no wheezes  Cardiovascular: S1, S2. Regular rate and rhythm  Abdomen: Soft, NT/ND, +BS  Neuro: No new focal deficits  Extremities: No edema    Assessment & Plan:  #Right Groin superficial abscess  S/p I&D with Dr. Holder  Currently on Cefazolin post op- continue for now x2 doses  F/u am labs  F/u OR cultures  SCD for dvt proph     #Hx aortic arch dissection  with aneurysm  #Prior Type A aortic dissection   #Hx left renal artery dissection   #Essential HTN  ASA, Plavix   Amlodipine, Clonidine, Lasix, Hydralazine, hydrochlorothiazide, metoprolol     #CKD 3A  Monitor BMP     #TANYA     #GERD  PPI     #HLD  Zetia            5/6    CARDIOLOGY:     Cardiology Consultation       Reason for Consultation:  nocturnal heart block        History of Present Illness:  Alisha Wilde is a a(n) 59 year old female. Very nice woman presented nov 2024 with Type A aortic dissection with emergent repair.  Quite ill with trach and PEG, ultimately recovered nicely.  Then presented 3/30/25 with type B dissection that required urgent stent grafting.  During that hospitalization, pauses were noted and beta blocker discontinued.  She was to have a TTM on dc.  She was admitted 5/5/25 for surgical excision of a groin abscess.  Overnight, while asleep using CPAP, she had brief episodes of complete AVB, pauses up to 3 seconds.  She denies any unexpected light headedness while awake.    Impression:   Nocturnal complete AVB with pauses, max 3 seconds last night.  No unexpected presyncope or syncope while awake.  Preserved EF April 2025.  Type A dissection with surgical repair, trach, PEG Nov 2024.  Type B dissection 3/30/25, stent grafting.  S/p excision of groin abscess on 5/5/25.  HTN  TANYA, on CPAP  CRI     Plan:  Stop metoprolol, continue other cardiac meds.        HOSPITALIST:        Vital signs:  Temp:  [97.5 °F (36.4 °C)-99 °F (37.2 °C)] 98.6 °F (37 °C)  Pulse:  [79-95] 94  Resp:  [18-26] 23  BP: (112-140)/(64-73) 112/67  SpO2:  [93 %-100 %] 97 %     Assessment & Plan:  #Right Groin superficial abscess  S/p I&D with Dr. Holder  F/u am labs  F/u OR cultures  SCD for dvt proph     #Hx aortic arch dissection with aneurysm  #Prior Type A aortic dissection   #Hx left renal artery dissection   #Essential HTN  ASA, Plavix  Amlodipine, Clonidine, Lasix, Hydralazine, hydrochlorothiazide     #Heart Block  On  tele  Cardiology following  Stop BB  Plan TTM on DC     #CKD 3A  Monitor BMP     #TANYA     #GERD  PPI     #HLD  Zetia           5/7      CARDIOLOGY:          Impression:  Nocturnal complete AVB with pauses, max 3 seconds last night.  No unexpected presyncope or syncope while awake.  No issues last night.  Preserved EF April 2025.  Type A dissection with surgical repair, trach, PEG Nov 2024.  Type B dissection 3/30/25, stent grafting.  S/p excision of groin abscess on 5/5/25.  May need further debridement.  HTN  TANYA, on CPAP  CRI      Plan:  Metoprolol stopped.  Cardiac status remains stable.  Await plan per vascular.  Outpatient 2 week TTM.             HOSPITALIST:          Vital signs:  Temp:  [98 °F (36.7 °C)-98.6 °F (37 °C)] 98 °F (36.7 °C)  Pulse:  [78-97] 84  Resp:  [18-26] 22  BP: (112-142)/(67-82) 124/72  SpO2:  [95 %-100 %] 100 %     Assessment & Plan:  #Right Groin superficial abscess  S/p I&D with Dr. Holder, plan for repeat today  F/u OR cultures  SCD for dvt proph     #Hx aortic arch dissection with aneurysm  #Prior Type A aortic dissection   #Hx left renal artery dissection   #Essential HTN  ASA, Plavix  Amlodipine, Clonidine, Lasix, Hydralazine, hydrochlorothiazide     #Heart Block  On tele  Cardiology following  Stop BB  Plan TTM on DC     #CKD 3A  Monitor BMP     #TANYA     #GERD  PPI     #HLD  Zetia          VASCULAR:          Frank Holder MD   Physician  Vascular Surgery     Brief Op Note     Signed     Date of Service: 5/7/2025  2:31 PM     Signed         Pre-Operative Diagnosis: Right groin wound status post incision and debridement     Post-Operative Diagnosis: Right groin wound status post incision and debridement     Procedure Performed:   Non-excisional debridement of right groin wound 3x3x2 cm  Placement of integra micro and cytal graft     Surgeons and Role:     * Frank Holder MD - Primary     Assistant(s):  Surgical Assistant.: Yaw Rojas, RSA     Surgical Findings: healthy tissue  without infection     Specimen: none     Estimated Blood Loss: 50 mL      Frank Holder MD                    5/8      HOSPITALIST:       Subjective:     OR yesterday for non exicisional debridement of R groin. Pt sitting in chair. Pain controlled      Vital signs:  Temp:  [98.1 °F (36.7 °C)-98.8 °F (37.1 °C)] 98.1 °F (36.7 °C)  Pulse:  [] 118  Resp:  [14-43] 43  BP: ()/(64-80) 133/78  SpO2:  [95 %-100 %] 96 %         Microbiology           Hospital Encounter on 05/05/25   1. Tissue Aerobic Culture     Status: Abnormal (Preliminary result)     Collection Time: 05/05/25  6:57 AM     Specimen: Tissue   Result Value Ref Range     Tissue Culture Result One colony Pseudomonas aeruginosa (A) N/A     Tissue Smear No WBCs seen N/A     Tissue Smear No organisms seen N/A   Assessment & Plan:  #Right Groin superficial abscess  S/p I&D with Dr. Holder, and non exicisional debridement 5/7  F/u OR cultures  SCD for dvt proph     #Hx aortic arch dissection with aneurysm  #Prior Type A aortic dissection   #Hx left renal artery dissection   #Essential HTN  ASA, Plavix  Amlodipine, Clonidine, Lasix, Hydralazine, hydrochlorothiazide     #Heart Block  On tele  Cardiology following  Stop BB  Plan TTM on DC     #CKD 3A  Monitor BMP     #TANYA     #GERD  PPI     #HLD  Zetia                MEDICATIONS ADMINISTERED IN LAST 1 DAY:  amLODIPine (Norvasc) tab 10 mg       Date Action Dose Route User    5/8/2025 0828 Given 10 mg Oral Glenna Jean-Baptiste RN          aspirin DR tab 81 mg       Date Action Dose Route User    5/8/2025 0829 Given 81 mg Oral Glenna Jean-Baptiste RN          cetirizine (ZyrTEC) tab 5 mg       Date Action Dose Route User    5/7/2025 2049 Given 5 mg Oral OcaAlison RN          cloNIDine (Catapres) partial tab 0.05 mg       Date Action Dose Route User    5/8/2025 0829 Given 0.05 mg Oral Glenna Jean-Baptiste RN          clopidogrel (Plavix) tab 75 mg       Date Action Dose Route User    5/8/2025 0828 Given 75 mg Oral Glenna Jean-Baptiste  RN          docusate sodium (Colace) cap 100 mg       Date Action Dose Route User    5/8/2025 0504 Given 100 mg Oral Oca, CYNDIE Rosas          ezetimibe (Zetia) tab 10 mg       Date Action Dose Route User    5/8/2025 0829 Given 10 mg Oral Jean-BaptisteGlenna RN    5/7/2025 1748 Given 10 mg Oral GalBird castillo RN          ferrous sulfate DR tab 325 mg       Date Action Dose Route User    5/8/2025 0828 Given 325 mg Oral Glenna Jean-Baptiste RN    5/7/2025 1748 Given 325 mg Oral Galanna, CYNDIE Pang          fluticasone propionate (Flonase) 50 MCG/ACT nasal suspension 1 spray       Date Action Dose Route User    5/8/2025 0829 Given 1 spray Each Nare Glenna Jean-Baptiste RN          furosemide (Lasix) tab 40 mg       Date Action Dose Route User    5/8/2025 0828 Given 40 mg Oral Glenna Jean-Baptiste RN    5/7/2025 1748 Given 40 mg Oral GalBird castillo RN          hydrALAZINE (Apresoline) tab 100 mg       Date Action Dose Route User    5/8/2025 1534 Given 100 mg Oral Glenna Jean-Baptiste RN    5/8/2025 0828 Given 100 mg Oral Jean-BaptisteGlenna RN    5/7/2025 2049 Given 100 mg Oral Oca, CYNDIE Rosas          hydroCHLOROthiazide tab 12.5 mg       Date Action Dose Route User    5/8/2025 0828 Given 12.5 mg Oral Glenna Jean-Baptiste RN    5/7/2025 1749 Given 12.5 mg Oral GalBird castillo RN          mirtazapine (Remeron) tab 7.5 mg       Date Action Dose Route User    5/7/2025 2059 Given 7.5 mg Per G Tube Oca, CYNDIE Rosas          pantoprazole (Protonix) DR tab 40 mg       Date Action Dose Route User    5/8/2025 0504 Given 40 mg Oral Oca, CYNDIE Rosas          polyethylene glycol (PEG 3350) (Miralax) 17 g oral packet 17 g       Date Action Dose Route User    5/8/2025 0504 Given 17 g Oral Oca, CYNDIE Rosas          thiamine (Vitamin B1) tab 100 mg       Date Action Dose Route User    5/8/2025 0829 Given 100 mg Oral Glenna Jean-Baptiste RN    5/7/2025 1749 Given 100 mg Oral Bird Vazquez, RN            ceFAZolin (Ancef) 2g in 10mL IV syringe premix  Dose: 2 g  Freq: Every 8 hours  Route: IV  Last Dose: 2 g (05/05/25 2303)  Start: 05/05/25 1500 End: 05/05/25 2308   Admin Instructions:   Not to exceed 24 hours from surgery end-time   Order specific questions:             1444 SM-New Bag     2303 DH-New Bag                    Medications 04/29 04/30 05/01 05/02 05/03 05/04 05/05 05/06 05/07 05/08   lactated ringers infusion  Rate: 100 mL/hr  Freq: Continuous Route: IV  Start: 05/07/25 1445 End: 05/07/25 1543            1445 JS-Rate/Dose Change     1543-D/C'd       sodium chloride 0.9% infusion  Rate: 100 mL/hr  Freq: Continuous Route: IV  Last Dose: Stopped (05/05/25 1100)  Start: 05/05/25 0745 End: 05/05/25 0902          0728 DS-Rate/Dose Change     0755 DS-New Bag     0902-D/C'd  1100 SM-Stopped               Or   HYDROcodone-acetaminophen (Norco) 5-325 MG per tab 1 tablet  Dose: 1 tablet  Freq: Every 4 hours PRN Route: OR  PRN Reason: moderate pain  Start: 05/05/25 0911   Admin Instructions:   Use PRN reason as a guide and follow range order policy          1037 SM-Given           1550 KG-Given     2043 DH-Given      0948 KG-Given     1401 UE-MAR Hold     1620 KG-MAR Unhold             Latest Reference Range & Units 05/06/25 07:19   Glucose 70 - 99 mg/dL 107 (H)   Sodium 136 - 145 mmol/L 138   Potassium 3.5 - 5.1 mmol/L 3.7   Chloride 98 - 112 mmol/L 105   Carbon Dioxide, Total 21.0 - 32.0 mmol/L 22.0   BUN 9 - 23 mg/dL 12   CREATININE 0.55 - 1.02 mg/dL 1.53 (H)   CALCIUM 8.7 - 10.6 mg/dL 9.4   EGFR >=60 mL/min/1.73m2 38 (L)   ANION GAP 0 - 18 mmol/L 11   CALCULATED OSMOLALITY 275 - 295 mOsm/kg 286   Magnesium, Serum 1.6 - 2.6 mg/dL 2.1   WBC 4.0 - 11.0 x10(3) uL 5.4   Hemoglobin 12.0 - 16.0 g/dL 8.4 (L)   Hematocrit 35.0 - 48.0 % 27.1 (L)   Platelet Count 150.0 - 450.0 10(3)uL 241.0   RBC 3.80 - 5.30 x10(6)uL 3.43 (L)   MCH 26.0 - 34.0 pg 24.5 (L)   MCHC 31.0 - 37.0 g/dL 31.0   MCV 80.0 - 100.0 fL 79.0 (L)   (H): Data is abnormally high  (L): Data is abnormally low    Vitals (last day)        Date/Time Temp Pulse Resp BP SpO2 Weight O2 Device O2 Flow Rate (L/min) [facility]    05/08/25 1535 98.6 °F (37 °C) 96 26 115/69 100 % -- None (Room air) -- JN    05/08/25 1251 98.3 °F (36.8 °C) 92 21 119/72 94 % -- None (Room air) -- JN    05/08/25 1030 -- 118 43 -- 96 % -- -- -- JN    05/08/25 0828 98.1 °F (36.7 °C) 88 18 133/78 100 % -- None (Room air) -- NANI    05/08/25 0438 98.3 °F (36.8 °C) 84 22 123/78 100 % -- CPAP -- MM    05/08/25 0100 -- 96 18 -- 98 % -- -- -- LP    05/07/25 2314 98.3 °F (36.8 °C) 94 22 120/75 99 % -- None (Room air) 0 L/min MM    05/07/25 2100 -- 95 21 116/75 98 % -- -- -- OSIEL    05/07/25 1913 98.1 °F (36.7 °C) 107 18 105/71 96 % -- None (Room air) -- ZH    05/07/25 1730 -- 113 18 98/64 97 % -- None (Room air) -- KG    05/07/25 1700 98.3 °F (36.8 °C) 105 19 102/65 97 % -- None (Room air) -- MB    05/07/25 1630 -- 99 21 124/80 97 % -- None (Room air) -- KG    05/07/25 1615 -- 96 24 127/73 98 % -- None (Room air) -- KG    05/07/25 1600 -- 87 14 120/71 95 % -- None (Room air) -- KG    05/07/25 1551 98.4 °F (36.9 °C) 86 16 132/74 99 % -- None (Room air) -- MB    05/07/25 1522 98.7 °F (37.1 °C) 86 18 121/71 100 % -- Nasal cannula 2 L/min JS    05/07/25 1507 -- 87 19 123/68 100 % -- -- --     05/07/25 1452 -- 86 19 124/67 100 % -- Nasal cannula 2 L/min     05/07/25 1447 -- 89 19 131/70 100 % -- -- --     05/07/25 1442 -- 91 18 130/72 100 % -- -- --     05/07/25 1437 98.8 °F (37.1 °C) 94 16 121/69 100 % -- Nasal cannula 2 L/min     05/07/25 1150 98.8 °F (37.1 °C) 103 22 122/73 98 % -- None (Room air) -- MB    05/07/25 0758 98 °F (36.7 °C) 84 22 124/72 100 % -- None (Room air) -- MB    05/07/25 0423 98.3 °F (36.8 °C) 78 24 142/82 100 % 217 lb 2.5 oz (98.5 kg) High flow nasal cannula -- RACHNA    05/07/25 0016 98.2 °F (36.8 °C) 90 26 137/81 100 % -- High flow nasal cannula -- RACHNA          CIWA Scores (since admission)       None

## 2025-05-08 NOTE — PROGRESS NOTES
[facility]   part of [facility]     Hospitalist Progress Note     Alisha Wilde Patient Status:  Inpatient    10/25/1965 MRN [id number]   Location [facility] 2NE-A Attending Frank Holder MD   Hosp Day # 3 PCP Bridger Avendano MD     Chief Complaint: med management    Subjective:     Patient without acute events overnight. OR yesterday for non exicisional debridement of R groin. Pt sitting in chair. Pain controlled. No CP or SOB. No F/C.     Objective:    Review of Systems:   A comprehensive review of systems was completed; pertinent positive and negatives stated in subjective.    Vital signs:  Temp:  [98.1 °F (36.7 °C)-98.8 °F (37.1 °C)] 98.1 °F (36.7 °C)  Pulse:  [] 118  Resp:  [14-43] 43  BP: ()/(64-80) 133/78  SpO2:  [95 %-100 %] 96 %    Physical Exam:    General: No acute distress  Respiratory: No wheezes, no rhonchi  Cardiovascular: S1, S2, regular rate and rhythm  Abdomen: Soft, Non-tender, non-distended, positive bowel sounds  Neuro: No new focal deficits.   Extremities: No edema      Diagnostic Data:    Labs:  Recent Labs   Lab 25  0719   WBC 5.4   HGB 8.4*   MCV 79.0*   .0       Recent Labs   Lab 25  0719   *   BUN 12   CREATSERUM 1.53*   CA 9.4      K 3.7      CO2 22.0       Estimated Glomerular Filtration Rate: 38 mL/min/1.73m2 (A) (result from lab).    No results for input(s): \"TROP\", \"TROPHS\", \"CK\" in the last 168 hours.    No results for input(s): \"PTP\", \"INR\" in the last 168 hours.               Microbiology    Hospital Encounter on 25   1. Tissue Aerobic Culture     Status: Abnormal (Preliminary result)    Collection Time: 25  6:57 AM    Specimen: Tissue   Result Value Ref Range    Tissue Culture Result One colony Pseudomonas aeruginosa (A) N/A    Tissue Smear No WBCs seen N/A    Tissue Smear No organisms seen N/A         Imaging: Reviewed in Epic.    Medications:    fluticasone propionate  1 spray Each Nare Daily     amLODIPine  10 mg Oral Daily    ezetimibe  10 mg Oral Daily    pantoprazole  40 mg Oral QAM AC    aspirin  81 mg Oral Daily    hydroCHLOROthiazide  12.5 mg Oral Daily    hydrALAZINE  100 mg Oral TID    cloNIDine  0.05 mg Oral Daily    clopidogrel  75 mg Oral Daily    furosemide  40 mg Oral Daily    thiamine  100 mg Oral Daily    cetirizine  5 mg Oral Nightly    ferrous sulfate  325 mg Oral Daily with breakfast       Assessment & Plan:      #Right Groin superficial abscess  S/p I&D with Dr. Holder, and non exicisional debridement 5/7  F/u OR cultures  SCD for dvt proph     #Hx aortic arch dissection with aneurysm  #Prior Type A aortic dissection   #Hx left renal artery dissection   #Essential HTN  ASA, Plavix  Amlodipine, Clonidine, Lasix, Hydralazine, hydrochlorothiazide    #Heart Block  On tele  Cardiology following  Stop BB  Plan TTM on DC     #CKD 3A  Monitor BMP     #TANYA     #GERD  PPI     #HLD  Rahtia          Sumit Kapoor MD    Supplementary Documentation:     Quality:  DVT Mechanical Prophylaxis:   SCDs,    DVT Pharmacologic Prophylaxis   Medication   None         DVT Pharmacologic prophylaxis: Aspirin 81 mg      Code Status: Full Code  Webb: External urinary catheter in place  Webb Duration (in days):   Central line:    BRANDON:     Discharge is dependent on: progress  At this point Ms. Wilde is expected to be discharge to: home    The 21st Century Cures Act makes medical notes like these available to patients in the interest of transparency. Please be advised this is a medical document. Medical documents are intended to carry relevant information, facts as evident, and the clinical opinion of the practitioner. The medical note is intended as peer to peer communication and may appear blunt or direct. It is written in medical language and may contain abbreviations or verbiage that are unfamiliar.

## 2025-05-08 NOTE — CM/SW NOTE
05/08/25 1300   CM/SW Referral Data   Referral Source Social Work (self-referral)   Reason for Referral Discharge planning   Informant EMR;Clinical Staff Member;Patient   Patient Info   Patient's Current Mental Status at Time of Assessment Alert;Oriented   Patient's Home Environment Condo/Apt with elevator   Patient lives with Alone   Patient Status Prior to Admission   Independent with ADLs and Mobility Yes   Services in place prior to admission DME/Supplies at home   Type of DME/Supplies Straight Cane;CPAP   Discharge Needs   Anticipated D/C needs Home health care   Choice of Post-Acute Provider   Informed patient of right to choose their preferred provider Yes   List of appropriate post-acute services provided to patient/family with quality data Yes   Patient/family choice [facility]   Information given to Patient     HOME SITUATION per PT eval  Type of Home: Apartment (5th floor)  Home Layout: One level, Elevator  Stairs to Enter : 6   Railing: Yes    Stairs to Bedroom: 0         Lives With: Alone                Prior Level of [place] per PT eval: The pt reports she is typically independent with ambulation in her apartment, but uses a cane for community ambulation.  Pt is typically independent with I/ADL's, but was receiving help from her nephew, with shopping and cooking, following her recent hospitalization.  Pt denies any falls at home.        Patient is a 58 y/o female s/p I&D of superficial abscess.    Anticipated therapy need for pt at DC is HH. Referral sent in Aidin and options list was obtained.    SW also noted and acknowledged order for a home wound vac. Referral was sent to [facility] via Aidin.        RENALDO met with pt at bedside to discuss DC plan.     Pt reports she lives alone in her apartment. Pt reports she owns a cane that she mainly uses only when out in the community. Pt also stated she owns a CPAP that she uses nightly. Pt reports typically being independent with her ADLs.    RENALDO  discussed HH services for pt at DC. Pt agreeable with services. RENALDO provided pt with HH options list. Pt reviewed the options and chose [facility].      SW reserved [facility] in Aidin.      RENALDO also informed pt that process to obtain home wound vac has been started, but that confirmation will be need from Dr. Holder and wound care on whether she will need the home wound vac or not. Pt verbalized understanding.    Pt thanked RENALDO for stopping by and denied having any further questions at this time.       to remain available for support and/or discharge planning.    Addendum: RENALDO notified by CYNDIE Manzanares that she confirmed with Dr. Holder and wound care that pt will need wound vac prior to DC home.    RN also stated that wound care will place home wound vac on prior to pt discharging.      RENALDO notified Neil from [facility] that pt will need the home wound vac prior to DC.      Paris Hdz, LETICIA  Discharge Planner  [phone]

## 2025-05-08 NOTE — PHYSICAL THERAPY NOTE
PHYSICAL THERAPY TREATMENT NOTE - INPATIENT    Room Number: 2616/2616-A       Session: 1     Number of Visits to Meet Established Goals: 3    Presenting Problem: Superficial abcess of R groin s/p recent endovascular aortic arch dissection repair  Co-Morbidities : HTN, HLD, CKDIII    PHYSICAL THERAPY MEDICAL/SOCIAL HISTORY  History related to current admission: Patient is a 59 year old female admitted on 5/5/2025 for planned surgical intervention for superficial abcess of R groin s/p recent endovascular aortic arch dissection repair.  Pt s/p I&D of R groin on 5/5/25.     Recent admission:  3/30/25-4/15/25 with aortic arch dissection s/p endovascular repair, discharged home     HOME SITUATION  Type of Home: Apartment (5th floor)  Home Layout: One level, Elevator  Stairs to Enter : 6   Railing: Yes    Stairs to Bedroom: 0         Lives With: Alone                Prior Level of Evangeline: The pt reports she is typically independent with ambulation in her apartment, but uses a cane for community ambulation.  Pt is typically independent with I/ADL's, but was receiving help from her nephew, with shopping and cooking, following her recent hospitalization.  Pt denies any falls at home.    PHYSICAL THERAPY ASSESSMENT     Patient is currently functioning near baseline with bed mobility, transfers, and gait.    Patient currently does not meet criteria for skilled inpatient physical therapy services, however patient will continue to benefit from QID ambulation with nursing staff.  Pt is discharged from IP PT services.  RN aware to re-order if there is a decline in mobility status.      Patient continues to benefit from continued skilled PT services: at discharge to promote prior level of function and safety with additional support and return home with home health PT.    PLAN DURING HOSPITALIZATION  Nursing Mobility Recommendation : 1 Assist  PT Device Recommendation: Cane  PT Treatment Plan: Bed mobility, Endurance, Patient  education, Family education, Gait training, Neuromuscular re-educate, Strengthening, Transfer training, Balance training  Frequency (Obs):  (2-3x week)     CURRENT GOALS     Goal #1 Patient is able to demonstrate supine - sit EOB @ level: modified independent  MET 5/6   Goal #2 Patient is able to demonstrate transfers Sit to/from Stand at assistance level: modified independent    Met 5/8/2025       Goal #3 Patient is able to ambulate 150 feet with assist device: cane - straight at assistance level: modified independent    Met 5/8/2025       Goal #4     Goal #5     Goal #6     Goal Comments: Goals established on 5/6/2025 5/8/2025 all goals achieved at SBA level or above     SUBJECTIVE  \"I am doing ok, just waiting on the doctor\"    OBJECTIVE  Precautions:  (R groin wound)    WEIGHT BEARING RESTRICTION     PAIN ASSESSMENT   Rating: Unable to rate  Location: R groin  Management Techniques: Activity promotion, Repositioning    BALANCE                                                                                                                       Static Sitting: Good  Dynamic Sitting: Fair +           Static Standing: Fair -  Dynamic Standing: Poor +    ACTIVITY TOLERANCE                         O2 WALK       AM-PAC '6-Clicks' INPATIENT SHORT FORM - BASIC MOBILITY  How much difficulty does the patient currently have...  Patient Difficulty: Turning over in bed (including adjusting bedclothes, sheets and blankets)?: None   Patient Difficulty: Sitting down on and standing up from a chair with arms (e.g., wheelchair, bedside commode, etc.): None   Patient Difficulty: Moving from lying on back to sitting on the side of the bed?: None   How much help from another person does the patient currently need...   Help from Another: Moving to and from a bed to a chair (including a wheelchair)?: None   Help from Another: Need to walk in hospital room?: None   Help from Another: Climbing 3-5 steps with a railing?: A Little      AM-PAC Score:  Raw Score: 23   Approx Degree of Impairment: 11.2%   Standardized Score (AM-PAC Scale): 56.93   CMS Modifier (G-Code): CI    FUNCTIONAL ABILITY STATUS  Gait Assessment   Functional Mobility/Gait Assessment  Gait Assistance: Modified independent  Distance (ft): 300  Assistive Device: Cane  Pattern: R Foot flat, L Foot flat (step through gait)  Stairs: Stairs (denied stairs - plans to use elevator d/t wound vac)    Skilled Therapy Provided    Bed Mobility:  Rolling: NT   Supine<>Sit: Mod I   Sit<>Supine: NT     Transfer Mobility:  Sit<>Stand: Mod I   Stand<>Sit: Mod I   Gait: Mod I cane    Therapist's Comments: no mobility concerns prior to dc      Patient End of Session: Up in chair, Needs met, Call light within reach, RN aware of session/findings, All patient questions and concerns addressed, Hospital anti-slip socks    PT Session Time: 15 minutes  Gait Training: 15 minutes  Therapeutic Activity: 0 minutes  Therapeutic Exercise: 0 minutes   Neuromuscular Re-education: 0 minutes

## 2025-05-08 NOTE — PLAN OF CARE
Assumed care of patient at 0730.  Alert and oriented x4.  On room air with adequate O2 saturations.  NSR on tele. No complaints of chest pain or shortness of breath. R groin wound vac site clean,dry,intact.   Continent of bowel and bladder.   Awaiting plan of care from Dr. Holder.       Addendum:   Paged Dr. Holder to clarify plan of care. Per dr. Holder, plan to dc tomorrow with wound vac. Reached out to wound care who stated she will need the wound vac placed before she leaves and they will do it tomorrow, she is going to clarify orders with Dr. Holder in the morning. Per  the home wound vac was ordered.    Problem: Patient/Family Goals  Goal: Patient/Family Long Term Goal  Description: Patient's Long Term Goal: Stay healthy  Interventions:- Resume home med regimen, follow up with providers- See additional Care Plan goals for specific interventions  Outcome: Progressing  Goal: Patient/Family Short Term Goal  Description: Patient's Short Term Goal: Discharge from hospital  Interventions: - Post op monitor, wound care to see, IV ancef- See additional Care Plan goals for specific interventions  Outcome: Progressing     Problem: SKIN/TISSUE INTEGRITY - ADULT  Goal: Skin integrity remains intact  Description: INTERVENTIONS- Assess and document risk factors for pressure ulcer development- Assess and document skin integrity- Monitor for areas of redness and/or skin breakdown- Initiate interventions, skin care algorithm/standards of care as needed  Outcome: Progressing  Goal: Incision(s), wounds(s) or drain site(s) healing without S/S of infection  Description: INTERVENTIONS:- Assess and document risk factors for pressure ulcer development- Assess and document skin integrity- Assess and document dressing/incision, wound bed, drain sites and surrounding tissue- Implement wound care per orders- Initiate isolation precautions as appropriate- Initiate Pressure Ulcer prevention bundle as indicated  Outcome:  Progressing  Goal: Oral mucous membranes remain intact  Description: INTERVENTIONS- Assess oral mucosa and hygiene practices- Implement preventative oral hygiene regimen- Implement oral medicated treatments as ordered  Outcome: Progressing     Problem: PAIN - ADULT  Goal: Verbalizes/displays adequate comfort level or patient's stated pain goal  Description: INTERVENTIONS:- Encourage pt to monitor pain and request assistance- Assess pain using appropriate pain scale- Administer analgesics based on type and severity of pain and evaluate response- Implement non-pharmacological measures as appropriate and evaluate response- Consider cultural and social influences on pain and pain management- Manage/alleviate anxiety- Utilize distraction and/or relaxation techniques- Monitor for opioid side effects- Notify MD/LIP if interventions unsuccessful or patient reports new pain- Anticipate increased pain with activity and pre-medicate as appropriate  Outcome: Progressing

## 2025-05-08 NOTE — CM/SW NOTE
05/08/25 1618   Choice of Post-Acute Provider   Informed patient of right to choose their preferred provider Yes   List of appropriate post-acute services provided to patient/family with quality data Yes   Patient/family choice [facility]   Information given to Patient     RENALDO was notified by Sepideh Howard from [facility] that they would no longer be able to accept pt as they do not have the staff to start services in a timely manner.      SW met with pt at bedside to discuss the above. Pt agreeable with switching to [facility] for  services after DC.    Pt did express concerns with insurance coverage for  services. SW stated she would have [facility] reach out to her to discuss coverage. Pt agreeable with this.      SW reserved [facility] in [place]. SW also sent them a message in [place] requesting they reach out to pt to discuss insurance coverage.       to remain available for support and/or discharge planning.    LETICIA Echevarria  Discharge Planner  [phone]

## 2025-05-09 ENCOUNTER — TELEPHONE (OUTPATIENT)
Dept: FAMILY MEDICINE CLINIC | Facility: CLINIC | Age: 60
End: 2025-05-09

## 2025-05-09 PROCEDURE — 99232 SBSQ HOSP IP/OBS MODERATE 35: CPT | Performed by: INTERNAL MEDICINE

## 2025-05-09 RX ORDER — LEVOFLOXACIN 500 MG/1
500 TABLET, FILM COATED ORAL DAILY
Status: DISCONTINUED | OUTPATIENT
Start: 2025-05-09 | End: 2025-05-10

## 2025-05-09 RX ORDER — LEVOFLOXACIN 500 MG/1
500 TABLET, FILM COATED ORAL DAILY
Qty: 10 TABLET | Refills: 0 | Status: SHIPPED | OUTPATIENT
Start: 2025-05-09 | End: 2025-05-19

## 2025-05-09 NOTE — CONSULTS
INFECTIOUS DISEASE CONSULTATION    Alisha Wilde Patient Status:  Inpatient    10/25/1965 MRN [id number]   [facility] 2NE-A Attending Frank Holder MD   Hosp Day # 4 PCP Bridger Avendano MD       Requested by Dr. 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  She developed a superficial abscess of the right groin and underwent an I/D with excisional debridement on , followed by an integra graft on .  One colony of Pseudomonas aeruginosa was isolated from the tissue culture on .   Anaerobic cultures were no growth to date, not final.        History:  Past Medical History[1]  Past Surgical History[2]  Family History[3]   reports that she quit smoking about 26 years ago. Her smoking use included cigarettes. She started smoking about 39 years ago. She has a 13 pack-year smoking history. She has quit using smokeless tobacco. She reports that she does not currently use alcohol after a past usage of about 1.0 - 2.0 standard drink of alcohol per week. She reports that she does not use drugs.      Allergies:  Allergies[4]    Medications:  Current Hospital Medications[5]  Medications Ordered Prior to Encounter[6]    Review of Systems:    A comprehensive 10 point review of systems was completed.  Pertinent positives and negatives noted in the the HPI.      Physical Exam:    General: No acute distress. Alert and oriented x 3.  Vital signs: Temp:  [97.4 °F (36.3 °C)-98.6 °F (37 °C)] 98 °F (36.7 °C)  Pulse:  [] 84  Resp:  [18-31] 20  BP: (108-137)/(69-85) 137/78  SpO2:  [94 %-100 %] 100 %  Body mass index is 36.14 kg/m².  HEENT: Moist mucous membranes. Extraocular muscles are intact.  Neck: No swelling, no masses  Respiratory: Non  labored, symmetric exursion  Cardiovascular: No irregularities in rhythm  Abdomen: Soft, nontender, nondistended.    Right groin covered with wound vac  Musculoskeletal: Full range of motion of all extremities.  No swelling noted.  Joints: no effusions  Skin: No lesions. No erythema, no open wounds    Laboratory Data:  Laboratory data reviewed      Recent Labs   Lab 05/06/25  0719   RBC 3.43*   HGB 8.4*   HCT 27.1*   MCV 79.0*   MCH 24.5*   MCHC 31.0   RDW 17.7   NEPRELIM 3.72   WBC 5.4   .0       Recent Labs   Lab 05/06/25  0719   *   BUN 12   CREATSERUM 1.53*   CA 9.4      K 3.7      CO2 22.0                Microbiologic Data:   Hospital Encounter on 05/05/25   1. Tissue Aerobic Culture     Status: Abnormal    Collection Time: 05/05/25  6:57 AM    Specimen: Tissue   Result Value Ref Range    Tissue Culture Result One colony Pseudomonas aeruginosa (A) N/A    Tissue Smear No WBCs seen N/A    Tissue Smear No organisms seen N/A       Susceptibility    Pseudomonas aeruginosa -  (no method available)     Cefepime 8 Sensitive      Ceftazidime 16 Intermediate      Ciprofloxacin* <=1 Sensitive       * The current CLSI susceptibility breakpoint for ciprofloxacin is an RYAN of 0.5 mcg/mL. MICs above this should NOT be considered susceptible. Updated susceptibility reporting is in progress.     Meropenem <=1 Sensitive      Levofloxacin <=0.25 Sensitive      Piperacillin + Tazobactam* 32 Sensitive       * The current CLSI piperacillin-tazobactam susceptibility breakpoint is an RYAN of 16 mcg/mL. MICs above this should NOT be considered susceptible. Updated susceptibility reporting is in progress.     Tobramycin* 2 Sensitive       * The current CLSI susceptibility breakpoint for tobramycin is an RYAN of 1. MICs above this should NOT be considered susceptible. Updated susceptibility reporting is in progress.   2. Anaerobic Culture     Status: None (Preliminary result)    Collection Time: 05/05/25  6:57 AM     Specimen: Tissue   Result Value Ref Range    Anaerobic Culture No Anaerobes to date N/A         Established Problem list:  Problem List[7]    ASSESSMENT/PLAN:  1. SP I/D superficial abscess right groin on , followed by integra graft on   Psuedomonas isolated, no anaerobes to date    Sensitivities noted  PO Levaquin for DC          Juan Churchill MD, MD  [facility]  [phone]         [1]   Past Medical History:   Arthritis    Chronic kidney disease (CKD)    Esophageal reflux    High blood pressure    High cholesterol    Hyperlipidemia    HYPERTENSION    Hypertension    Unspecified essential hypertension   [2]   Past Surgical History:  Procedure Laterality Date    Anesth,open heart surgery  2024    Colonoscopy N/A 2018    Procedure: COLONOSCOPY;  Surgeon: Magdy Webber MD;  Location: [facility] ENDOSCOPY    Colonoscopy  2018    Endometrial ablation      child passed away for 5 mins          1    Other surgical history  2011    cysto-Dr. Lacey -- pt denies   [3]   Family History  Problem Relation Age of Onset    Hypertension Mother     Heart Disorder Mother 70    Other (Other) Mother         kidney failure    Hypertension Father     Hypertension Maternal Grandfather     Cancer Neg     Diabetes Neg     Glaucoma Neg     Macular degeneration Neg    [4]   Allergies  Allergen Reactions    Atorvastatin MYALGIA    Pravastatin MYALGIA    Rosuvastatin MYALGIA    Seasonal Runny nose   [5]   Current Facility-Administered Medications:     docusate sodium (Colace) cap 100 mg, 100 mg, Oral, BID    polyethylene glycol (PEG 3350) (Miralax) 17 g oral packet 17 g, 17 g, Oral, Daily PRN    magnesium hydroxide (Milk of Magnesia) 400 MG/5ML oral suspension 30 mL, 30 mL, Oral, Daily PRN    bisacodyl (Dulcolax) 10 MG rectal suppository 10 mg, 10 mg, Rectal, Daily PRN    fleet enema (Fleet) rectal enema 133 mL, 1 enema, Rectal, Once PRN    bisacodyl (Dulcolax) DR tab  5 mg, 5 mg, Oral, Daily PRN    fluticasone propionate (Flonase) 50 MCG/ACT nasal suspension 1 spray, 1 spray, Each Nare, Daily    amLODIPine (Norvasc) tab 10 mg, 10 mg, Oral, Daily    docusate sodium (Colace) cap 100 mg, 100 mg, Oral, BID PRN    ezetimibe (Zetia) tab 10 mg, 10 mg, Oral, Daily    pantoprazole (Protonix) DR tab 40 mg, 40 mg, Oral, QAM AC    aspirin DR tab 81 mg, 81 mg, Oral, Daily    hydroCHLOROthiazide tab 12.5 mg, 12.5 mg, Oral, Daily    hydrALAZINE (Apresoline) tab 100 mg, 100 mg, Oral, TID    cloNIDine (Catapres) partial tab 0.05 mg, 0.05 mg, Oral, Daily    clopidogrel (Plavix) tab 75 mg, 75 mg, Oral, Daily    furosemide (Lasix) tab 40 mg, 40 mg, Oral, Daily    acetaminophen (Tylenol) tab 650 mg, 650 mg, Oral, Q4H PRN **OR** HYDROcodone-acetaminophen (Norco) 5-325 MG per tab 1 tablet, 1 tablet, Oral, Q4H PRN **OR** HYDROcodone-acetaminophen (Norco) 5-325 MG per tab 2 tablet, 2 tablet, Oral, Q4H PRN    ondansetron (Zofran) 4 MG/2ML injection 4 mg, 4 mg, Intravenous, Q6H PRN    prochlorperazine (Compazine) 10 MG/2ML injection 5 mg, 5 mg, Intravenous, Q8H PRN    thiamine (Vitamin B1) tab 100 mg, 100 mg, Oral, Daily    mirtazapine (Remeron) tab 7.5 mg, 7.5 mg, Per G Tube, Nightly PRN    cetirizine (ZyrTEC) tab 5 mg, 5 mg, Oral, Nightly    ferrous sulfate DR tab 325 mg, 325 mg, Oral, Daily with breakfast    melatonin tab 3 mg, 3 mg, Oral, Nightly PRN    glycerin-hypromellose- (Artificial Tears) 0.2-0.2-1 % ophthalmic solution 1 drop, 1 drop, Both Eyes, QID PRN    sodium chloride (Saline Mist) 0.65 % nasal solution 1 spray, 1 spray, Each Nare, Q3H PRN  [6]   Current Facility-Administered Medications on File Prior to Encounter   Medication Dose Route Frequency Provider Last Rate Last Admin    [COMPLETED] hydrALAZINE (Apresoline) tab 25 mg  25 mg Oral Once Carson Curran APRN   25 mg at 04/13/25 1125    [COMPLETED] hydrALAzine (Apresoline) 20 mg/mL injection 10 mg  10 mg Intravenous Once  Vikki Kwan, APRN   10 mg at 25    [COMPLETED] potassium chloride (Klor-Con M20) tab 40 mEq  40 mEq Oral Once Danette Atwood DO   40 mEq at 04/10/25 0829    [COMPLETED] potassium phosphate dibasic 15 mmol in sodium chloride 0.9% 250 mL IVPB  15 mmol Intravenous Once Bunny Atwood MD 62.5 mL/hr at 25 0641 15 mmol at 25 0641    Followed by    [COMPLETED] potassium chloride 20 mEq/100mL IVPB premix 20 mEq  20 mEq Intravenous Once Bunny Atwood MD 50 mL/hr at 25 1009 20 mEq at 25 1009    [COMPLETED] piperacillin-tazobactam (Zosyn) 3.375 g in dextrose 5% 100 mL IVPB-ADDV  3.375 g Intravenous Q8H Frank Holder MD 25 mL/hr at 25 1808 3.375 g at 25 1808    [COMPLETED] potassium chloride 20 mEq/100mL IVPB premix 20 mEq  20 mEq Intravenous Once Jade Cameron MD 50 mL/hr at 25 1042 20 mEq at 25 1042    [COMPLETED] sodium chloride 0.9% infusion   Intravenous Once Woodrow Boswell MD 10 mL/hr at 25 1606 New Bag at 25 1606    [] ondansetron (Zofran) 4 MG/2ML injection 4 mg  4 mg Intravenous PRN Anshul Olvera MD        [] metoclopramide (Reglan) 5 mg/mL injection 5 mg  5 mg Intravenous PRN Anshul Olvera MD        [] dexamethasone (Decadron) 4 MG/ML injection 4 mg  4 mg Intravenous PRN Anshul Olvera MD        [] trimethobenzamide (Tigan) 100 mg/mL injection 200 mg  200 mg Intramuscular PRN Anshul Olvera MD        [] HYDROmorphone (Dilaudid) 1 MG/ML injection 0.4 mg  0.4 mg Intravenous Q5 Min PRN Anshul Olvera MD        [] HYDROcodone-acetaminophen (Norco) 5-325 MG per tab 1 tablet  1 tablet Oral Once PRN Anshul Olvera MD        Or    [] HYDROcodone-acetaminophen (Norco) 5-325 MG per tab 2 tablet  2 tablet Oral Once PRN Anshul Olvera MD        [] atropine 0.1 MG/ML injection 0.5 mg  0.5 mg Intravenous PRN  Anshul Olvera MD        [] naloxone (Narcan) 0.4 MG/ML injection 0.08 mg  0.08 mg Intravenous PRN Anshul Olvera MD        [] diphenhydrAMINE (Benadryl) 50 mg/mL  injection 12.5 mg  12.5 mg Intravenous PRN Anshul Olvera MD        [COMPLETED] potassium phosphate dibasic 15 mmol in sodium chloride 0.9% 250 mL IVPB  15 mmol Intravenous Once Stefany Mccall MD 62.5 mL/hr at 259 15 mmol at 25    [COMPLETED] potassium chloride 40 mEq in 250mL sodium chloride 0.9% IVPB premix  40 mEq Intravenous Once Tia Parker MD 62.5 mL/hr at 25 0507 40 mEq at 25 0507    [COMPLETED] potassium chloride 40 mEq in 250mL sodium chloride 0.9% IVPB premix  40 mEq Intravenous Once Jade Cameron MD 62.5 mL/hr at 25 1004 40 mEq at 25 1004    [COMPLETED] iopamidol 76% (ISOVUE-370) injection for power injector  100 mL Intravenous ONCE PRN Jade Cameron MD   100 mL at 25 1300    [COMPLETED] heparin (Porcine) 1000 UNIT/ML injection - BOLUS IV 5,000 Units  5,000 Units Intravenous Once Sb Campbell MD   5,000 Units at 25 1450    [COMPLETED] heparin (Porcine) 37562 units/250 mL infusion (ACS/AFIB) INITIAL DOSE  1,000 Units/hr Intravenous Once Sb Campbell MD 10 mL/hr at 25 1453 1,000 Units/hr at 25 1453    [COMPLETED] potassium chloride 20 mEq/100mL IVPB premix 20 mEq  20 mEq Intravenous Once Bunny Atwood MD 50 mL/hr at 25 0506 20 mEq at 25 0506    [COMPLETED] bisacodyl (Dulcolax) 10 MG rectal suppository 10 mg  10 mg Rectal Once Frank Holder MD   10 mg at 25 1103    [COMPLETED] furosemide (Lasix) 10 mg/mL injection 40 mg  40 mg Intravenous Once Bunny Atwood MD   40 mg at 25 0055    [COMPLETED] verapamil (Isoptin) 2.5 mg/mL injection             [COMPLETED] Nitroglycerin in D5W 200-5 MCG/ML-% injection             [] lactated ringers IV bolus 500  mL  500 mL Intravenous Once PRN Tanner Coffey MD        [] atropine 0.1 MG/ML injection 0.5 mg  0.5 mg Intravenous PRN Tanner Coffey MD        [] naloxone (Narcan) 0.4 MG/ML injection 0.08 mg  0.08 mg Intravenous PRN Tanner Coffey MD        [] fentaNYL (Sublimaze) 50 mcg/mL injection 25 mcg  25 mcg Intravenous Q5 Min PRN Tanner Coffey MD   25 mcg at 25    Or    [] fentaNYL (Sublimaze) 50 mcg/mL injection 50 mcg  50 mcg Intravenous Q5 Min PRN Tanner Coffey MD   50 mcg at 25 2249    [] HYDROmorphone (Dilaudid) 1 MG/ML injection 0.2 mg  0.2 mg Intravenous Q5 Min PRN Tanner Coffey MD        Or    [] HYDROmorphone (Dilaudid) 1 MG/ML injection 0.4 mg  0.4 mg Intravenous Q5 Min PRN Tanner Coffey MD   0.4 mg at 259    Or    [] HYDROmorphone (Dilaudid) 1 MG/ML injection 0.6 mg  0.6 mg Intravenous Q5 Min PRN Tanner Coffey MD        [COMPLETED] iodixanol (VISIPAQUE) 320 MG/ML injection 150 mL  150 mL Injection ONCE PRN Magdy Palm MD   250 mL at 25    [COMPLETED] ceFAZolin (Ancef) 2g in 10mL IV syringe premix  2 g Intravenous Q8H Magdy Palm  mL/hr at 25 1104 2 g at 25 1104    [COMPLETED] furosemide (Lasix) 10 mg/mL injection 40 mg  40 mg Intravenous Once Bunny Atwood MD   40 mg at 256    [COMPLETED] iopamidol 76% (ISOVUE-370) injection for power injector  100 mL Intravenous ONCE PRN Sumit Kapoor MD   100 mL at 25 0924    [COMPLETED] iopamidol 76% (ISOVUE-370) injection for power injector  80 mL Intravenous ONCE PRN Matheus Hayes MD   80 mL at 25 0833    [] nitroGLYCERIN in dextrose 5% 50 mg/250mL infusion premix             [COMPLETED] lidocaine (Xylocaine) 1 % injection  10 mL Intradermal Once Woodrow Boswell MD   10 mL at 25 1559    [COMPLETED] potassium phosphate dibasic 15 mmol in sodium chloride 0.9% 250 mL IVPB  15 mmol Intravenous Once  Woodrow Boswell MD 62.5 mL/hr at 03/30/25 1826 15 mmol at 03/30/25 1826    Followed by    [COMPLETED] potassium chloride 20 mEq/100mL IVPB premix 20 mEq  20 mEq Intravenous Once Woodrow Boswell MD 50 mL/hr at 03/31/25 0002 20 mEq at 03/31/25 0002     Current Outpatient Medications on File Prior to Encounter   Medication Sig Dispense Refill    furosemide 40 MG Oral Tab Take 1 tablet (40 mg total) by mouth daily.      clopidogrel 75 MG Oral Tab Take 1 tablet (75 mg total) by mouth daily.      thiamine 100 MG Oral Tab TAKE 1 TABLET DAILY WITH FEEDING TUBE (Patient taking differently: Take 1 tablet (100 mg total) by mouth daily.) 90 tablet 1    cloNIDine 0.1 MG Oral Tab Take 0.5 tablets (0.05 mg total) by mouth daily.      metoprolol succinate ER 25 MG Oral Tablet 24 Hr Take 1 tablet (25 mg total) by mouth daily.      levocetirizine 5 MG Oral Tab Take 1 tablet (5 mg total) by mouth every evening. 30 tablet 1    amoxicillin 875 MG Oral Tab Take 1 tablet (875 mg total) by mouth 2 (two) times daily for 10 days. 20 tablet 0    hydroCHLOROthiazide 12.5 MG Oral Tab Take 1 tablet (12.5 mg total) by mouth daily. 30 tablet 2    hydrALAZINE 100 MG Oral Tab Take 1 tablet (100 mg total) by mouth in the morning, at noon, and at bedtime. 90 tablet 2    aspirin 81 MG Oral Tab EC Take 1 tablet (81 mg total) by mouth daily. 30 tablet 0    acetaminophen 500 MG Oral Tab Take 2 tablets (1,000 mg total) by mouth every 6 (six) hours as needed for Pain.      ezetimibe 10 MG Oral Tab Take 1 tablet (10 mg total) by mouth in the morning.      pantoprazole 40 MG Oral Tab EC Take 1 tablet (40 mg total) by mouth every morning before breakfast. 90 tablet 3    bisacodyl 5 MG Oral Tab EC Take 1 tablet (5 mg total) by mouth daily as needed. 30 tablet 0    docusate sodium (COLACE) 100 MG Oral Cap Take 1 capsule (100 mg total) by mouth 2 (two) times daily as needed for constipation. 180 capsule 1    Ferrous Gluconate 324 (37.5 Fe) MG Oral Tab Take  1 tablet (324 mg total) by mouth daily. 90 tablet 1    azelastine 0.1 % Nasal Solution 1 spray by Nasal route 2 (two) times daily. (Patient taking differently: 1 spray by Nasal route daily as needed for Rhinitis.) 3 each 1    mirtazapine 7.5 MG Oral Tab 1 tablet (7.5 mg total) by Per G Tube route nightly. (Patient taking differently: 1 tablet (7.5 mg total) by Per G Tube route as needed.) 30 tablet 0    amLODIPine 10 MG Oral Tab Take 1 tablet (10 mg total) by mouth daily. 90 tablet 3    sulfamethoxazole-trimethoprim -160 MG Oral Tab per tablet Take 1 tablet by mouth 2 (two) times daily.     [7]   Patient Active Problem List  Diagnosis    Hypercholesteremia    Benign essential HTN    Vitamin D deficiency    Adjustment disorder with mixed anxiety and depressed mood    Controlled type 2 diabetes mellitus without complication, without long-term current use of insulin (HCC)    Obesity (BMI 30-39.9)    Polyp of colon    Flat feet, bilateral    Myalgia due to statin    Dissection of ascending aorta (HCC)    Stage 3 chronic kidney disease ([facility])    Iron deficiency    Constipation    Leg swelling    Gastroesophageal reflux disease    Dissection of aorta, unspecified portion of aorta (HCC)    Aneurysm of aortic arch    Sinus pause    Nasal congestion    Tachycardia-bradycardia (HCC)    Abscess of left groin    Heart block

## 2025-05-09 NOTE — PROGRESS NOTES
[facility]   part of [facility]     Hospitalist Progress Note     Alisha Wilde Patient Status:  Inpatient    10/25/1965 MRN [id number]   Location [facility] 2NE-A Attending Frank Holder MD   Hosp Day # 4 PCP Bridger Avendano MD     Chief Complaint: med management    Subjective:     Patient without acute events overnight. Pain controlled. No F/C. Toelrating wound vac.     Objective:    Review of Systems:   A comprehensive review of systems was completed; pertinent positive and negatives stated in subjective.    Vital signs:  Temp:  [97.4 °F (36.3 °C)-98.6 °F (37 °C)] 98.4 °F (36.9 °C)  Pulse:  [] 106  Resp:  [18-43] 31  BP: (108-132)/(69-85) 132/70  SpO2:  [94 %-100 %] 100 %    Physical Exam:    General: No acute distress  Respiratory: No wheezes, no rhonchi  Cardiovascular: S1, S2, regular rate and rhythm  Abdomen: Soft, Non-tender, non-distended, positive bowel sounds  Neuro: No new focal deficits.   Extremities: No edema      Diagnostic Data:    Labs:  Recent Labs   Lab 25  0719   WBC 5.4   HGB 8.4*   MCV 79.0*   .0       Recent Labs   Lab 25  0719   *   BUN 12   CREATSERUM 1.53*   CA 9.4      K 3.7      CO2 22.0       Estimated Glomerular Filtration Rate: 38 mL/min/1.73m2 (A) (result from lab).    No results for input(s): \"TROP\", \"TROPHS\", \"CK\" in the last 168 hours.    No results for input(s): \"PTP\", \"INR\" in the last 168 hours.               Microbiology    Hospital Encounter on 25   1. Tissue Aerobic Culture     Status: Abnormal (Preliminary result)    Collection Time: 25  6:57 AM    Specimen: Tissue   Result Value Ref Range    Tissue Culture Result One colony Pseudomonas aeruginosa (A) N/A    Tissue Smear No WBCs seen N/A    Tissue Smear No organisms seen N/A   2. Anaerobic Culture     Status: None (Preliminary result)    Collection Time: 25  6:57 AM    Specimen: Tissue   Result Value Ref Range    Anaerobic Culture No Anaerobes  to date N/A         Imaging: Reviewed in Epic.    Medications:    docusate sodium  100 mg Oral BID    fluticasone propionate  1 spray Each Nare Daily    amLODIPine  10 mg Oral Daily    ezetimibe  10 mg Oral Daily    pantoprazole  40 mg Oral QAM AC    aspirin  81 mg Oral Daily    hydroCHLOROthiazide  12.5 mg Oral Daily    hydrALAZINE  100 mg Oral TID    cloNIDine  0.05 mg Oral Daily    clopidogrel  75 mg Oral Daily    furosemide  40 mg Oral Daily    thiamine  100 mg Oral Daily    cetirizine  5 mg Oral Nightly    ferrous sulfate  325 mg Oral Daily with breakfast       Assessment & Plan:      #Right Groin superficial abscess  S/p I&D with Dr. Holder, and non exicisional debridement 5/7  Wound vac in place  Wound care eval  SCD for dvt proph     #Hx aortic arch dissection with aneurysm  #Prior Type A aortic dissection   #Hx left renal artery dissection   #Essential HTN  ASA, Plavix  Amlodipine, Clonidine, Lasix, Hydralazine, hydrochlorothiazide    #Heart Block  On tele  Cardiology following  Stop BB  Plan TTM on DC     #CKD 3A  Monitor BMP     #TANYA     #GERD  PPI     #HLD  Rahtia          Sumit Kapoor MD    Supplementary Documentation:     Quality:  DVT Mechanical Prophylaxis:   SCDs,    DVT Pharmacologic Prophylaxis   Medication   None         DVT Pharmacologic prophylaxis: Aspirin 81 mg      Code Status: Full Code  Webb: External urinary catheter in place  Webb Duration (in days):   Central line:    BRANDON: 5/9/2025    Discharge is dependent on: progress  At this point Ms. Wilde is expected to be discharge to: home    The 21st Century Cures Act makes medical notes like these available to patients in the interest of transparency. Please be advised this is a medical document. Medical documents are intended to carry relevant information, facts as evident, and the clinical opinion of the practitioner. The medical note is intended as peer to peer communication and may appear blunt or direct. It is written in medical  language and may contain abbreviations or verbiage that are unfamiliar.

## 2025-05-09 NOTE — CM/SW NOTE
RENALDO notified [facility] that pt is covered 100% through the end of the year since their deductible and OOP both have been met. Mignon Hoffmann from [facility] confirmed she spoke with pt regarding coverage.    Giselle just requested for SW to confirm with wound care if they need HH RN to start services tomorrow (5/10) or Monday (5/12) for the wound vac care. RENALDO stated she would update them on SOC after wound care sees pt.    Giselle also stated that pt had questions regarding coverage for the wound vac and wound care supplies.      RENALDO was notified by Neil from [employer] [facility] that pt was approved for the wound vac and is set to be delivered to pt's room later today. RENALDO requested for Neil to reach out to pt to discuss coverage.      RENALDO also updated RN on the above.        to remain available for support and/or discharge planning.      Addendum: RENALDO noted wound care placed their note for today. Patient was switch from the hospital wound vac to her home wound vac by wound care.    RENALDO spoke with wound care PT, Yuly, who stated HH RN can see pt Monday, but that \"R groin dressing is NOT to be changed until patient seen in OP clinic in 1 week.  Home RN to check the site and change the cannister as needed.\"    RENALDO sent wound care note to [facility] via [employer]. RENALDO also sent them a message notifying them that SOC can be Monday of what wound care instructed regarding wound vac care.      Paris Hdz SAEID  Discharge Planner  [phone]

## 2025-05-09 NOTE — PLAN OF CARE
Assumed patient care around 0730 this AM. Patient alert and oriented x4. Spo2 maintained on RA. NSR/ST on tele. Patient reported some chest tightness. Hospitalist at bedside and aware. No new orders at this time. Patient continent of bowel and bladder. Denies pain at this time. Left groin wound vac in place. Dressing clean, dry, and intact. Patient is up with standby assist and cane in the room. Updated on POC. Needs met.       Problem: Patient/Family Goals  Goal: Patient/Family Long Term Goal  Description: Patient's Long Term Goal: Stay healthy  Interventions:- Resume home med regimen, follow up with providers- See additional Care Plan goals for specific interventions  Outcome: Progressing  Goal: Patient/Family Short Term Goal  Description: Patient's Short Term Goal: Discharge from hospital  5/9: To go home later today     Interventions: - Post op monitor, wound care to see, IV ancef  5/9:   -Tele monitoring  -Monitor labs  -ID consult 5/9   -PO antibiotics started 5/9  -Monitor right groin site   -Wound vac for home to be delivered 5/9     - See additional Care Plan goals for specific interventions  Outcome: Progressing     Problem: SKIN/TISSUE INTEGRITY - ADULT  Goal: Skin integrity remains intact  Description: INTERVENTIONS- Assess and document risk factors for pressure ulcer development- Assess and document skin integrity- Monitor for areas of redness and/or skin breakdown- Initiate interventions, skin care algorithm/standards of care as needed  Outcome: Progressing  Goal: Incision(s), wounds(s) or drain site(s) healing without S/S of infection  Description: INTERVENTIONS:- Assess and document risk factors for pressure ulcer development- Assess and document skin integrity- Assess and document dressing/incision, wound bed, drain sites and surrounding tissue- Implement wound care per orders- Initiate isolation precautions as appropriate- Initiate Pressure Ulcer prevention bundle as indicated  Outcome:  Progressing  Goal: Oral mucous membranes remain intact  Description: INTERVENTIONS- Assess oral mucosa and hygiene practices- Implement preventative oral hygiene regimen- Implement oral medicated treatments as ordered  Outcome: Progressing     Problem: PAIN - ADULT  Goal: Verbalizes/displays adequate comfort level or patient's stated pain goal  Description: INTERVENTIONS:- Encourage pt to monitor pain and request assistance- Assess pain using appropriate pain scale- Administer analgesics based on type and severity of pain and evaluate response- Implement non-pharmacological measures as appropriate and evaluate response- Consider cultural and social influences on pain and pain management- Manage/alleviate anxiety- Utilize distraction and/or relaxation techniques- Monitor for opioid side effects- Notify MD/LIP if interventions unsuccessful or patient reports new pain- Anticipate increased pain with activity and pre-medicate as appropriate  Outcome: Progressing

## 2025-05-09 NOTE — CONSULTS
[facility]  Report of Inpatient Wound Care Consultation    Alisha Wilde Patient Status:  Inpatient    10/25/1965 MRN [id number]   Location [facility] 2NE-A Attending Frank Holder MD   Hosp Day # 4 PCP Bridger Avendano MD     Reason for Consultation:  R groin Negative Pressure Wound Thearpy    History of Present Illness:  Alisha Wilde is a a(n) 59 year old female.  Patient with multiple comorbidities.    SUBJECTIVE:  I am going home today.       History:  Past Medical History[1]  Past Surgical History[2]   reports that she quit smoking about 26 years ago. Her smoking use included cigarettes. She started smoking about 39 years ago. She has a 13 pack-year smoking history. She has quit using smokeless tobacco. She reports that she does not currently use alcohol after a past usage of about 1.0 - 2.0 standard drink of alcohol per week. She reports that she does not use drugs.      Allergies:  @ALLERGY    Laboratory Data:    Recent Labs   Lab 25  0719 25  1514   WBC 5.4  --    HGB 8.4*  --    HCT 27.1*  --    .0  --    CREATSERUM 1.53*  --    BUN 12  --    *  --    CA 9.4  --    PGLU  --  107*         ASSESSMENT:       Patient seen bedside.  Patient s/p I&D and placement of Integra and placement of Negative Pressure Wound Therapy in OR by Dr. Holder on 25.      Dressing NOT removed, confirmed with Dr. Holder he would like the dressing to stay intact until Friday when patient is seen as OP at [facility] [place].      Switched from Hospital unit to home unit, + 125mmHg continous setting.    Patient being discharged to home with home RN for care, RN and discharge team aware that the R groin dressing is NOT to be changed until patient seen in OP clinic in 1 week.  Home RN to check the site and change the cannister as needed.      RN, patient and team aware of the above.     Negative Pressure Wound Therapy:  NPWT: KCI vac therapy, black form at 125 mmHg continuous. RN to change  dressing 3x/week unless indicated below, only to be changed on 25 at [facility].     Additional Notes:  RN aware of the above.           Thank you for this consultation and for allowing me to participate in the care of your patient.      Time Spent 45 Minutes.    Thank you,  Yuly Uribe, PT, [facility]  Wound Care Clinician  [facility]  2025  1:56 PM         [1]   Past Medical History:   Arthritis    Chronic kidney disease (CKD)    Esophageal reflux    High blood pressure    High cholesterol    Hyperlipidemia    HYPERTENSION    Hypertension    Unspecified essential hypertension   [2]   Past Surgical History:  Procedure Laterality Date    Anesth,open heart surgery  2024    Colonoscopy N/A 2018    Procedure: COLONOSCOPY;  Surgeon: Magdy Webber MD;  Location: [facility] ENDOSCOPY    Colonoscopy  2018    Endometrial ablation      child passed away for 5 mins          1    Other surgical history  2011    cysto-Dr. Lacey -- pt denies

## 2025-05-10 VITALS
RESPIRATION RATE: 20 BRPM | HEART RATE: 107 BPM | BODY MASS INDEX: 36.18 KG/M2 | WEIGHT: 217.13 LBS | TEMPERATURE: 98 F | DIASTOLIC BLOOD PRESSURE: 76 MMHG | HEIGHT: 65 IN | SYSTOLIC BLOOD PRESSURE: 116 MMHG | OXYGEN SATURATION: 100 %

## 2025-05-10 PROCEDURE — 99232 SBSQ HOSP IP/OBS MODERATE 35: CPT | Performed by: INTERNAL MEDICINE

## 2025-05-10 RX ORDER — METRONIDAZOLE 500 MG/1
500 TABLET ORAL 2 TIMES DAILY
Status: DISCONTINUED | OUTPATIENT
Start: 2025-05-10 | End: 2025-05-10

## 2025-05-10 RX ORDER — METRONIDAZOLE 500 MG/1
500 TABLET ORAL 2 TIMES DAILY
Qty: 14 TABLET | Refills: 0 | Status: SHIPPED | OUTPATIENT
Start: 2025-05-10 | End: 2025-05-17

## 2025-05-10 NOTE — PLAN OF CARE
Patient is alert & oriented x 4, on room air, CPAP at night. Up x1 w/ cane. NSR/ST on tele monitor. Continent of bowel and bladder. Purewick placed at night per pt request. Right groin wound vac in place. Dressing clean, dry, intact. Pain managed with medication. Patient denies any shortness of breath, or chest pain/discomfort. Updated on plan of care and verbalized understanding. Fall precautions in place. Call light in reach.    Problem: SKIN/TISSUE INTEGRITY - ADULT  Goal: Skin integrity remains intact  Description: INTERVENTIONS- Assess and document risk factors for pressure ulcer development- Assess and document skin integrity- Monitor for areas of redness and/or skin breakdown- Initiate interventions, skin care algorithm/standards of care as needed  Outcome: Progressing  Goal: Incision(s), wounds(s) or drain site(s) healing without S/S of infection  Description: INTERVENTIONS:- Assess and document risk factors for pressure ulcer development- Assess and document skin integrity- Assess and document dressing/incision, wound bed, drain sites and surrounding tissue- Implement wound care per orders- Initiate isolation precautions as appropriate- Initiate Pressure Ulcer prevention bundle as indicated  Outcome: Progressing  Goal: Oral mucous membranes remain intact  Description: INTERVENTIONS- Assess oral mucosa and hygiene practices- Implement preventative oral hygiene regimen- Implement oral medicated treatments as ordered  Outcome: Progressing     Problem: PAIN - ADULT  Goal: Verbalizes/displays adequate comfort level or patient's stated pain goal  Description: INTERVENTIONS:- Encourage pt to monitor pain and request assistance- Assess pain using appropriate pain scale- Administer analgesics based on type and severity of pain and evaluate response- Implement non-pharmacological measures as appropriate and evaluate response- Consider cultural and social influences on pain and pain management- Manage/alleviate  anxiety- Utilize distraction and/or relaxation techniques- Monitor for opioid side effects- Notify MD/LIP if interventions unsuccessful or patient reports new pain- Anticipate increased pain with activity and pre-medicate as appropriate  Outcome: Progressing

## 2025-05-10 NOTE — PROGRESS NOTES
INFECTIOUS DISEASE  PROGRESS NOTE            [facility]    Alisha Wilde Patient Status:  Inpatient    10/25/1965 MRN [id number]   Formerly [facility] 2NE-A Attending Frank Holder MD   Hosp Day # 5 PCP Bridger Avendano MD     Antibiotics: levaquin #1  Flagyl #0    Subjective:  : comfrotable    Objective:  Temp:  [97.1 °F (36.2 °C)-98.3 °F (36.8 °C)] 97.7 °F (36.5 °C)  Pulse:  [] 107  Resp:  [15-27] 20  BP: ()/(66-81) 116/76  SpO2:  [99 %-100 %] 100 %      Vital signs:Temp:  [97.1 °F (36.2 °C)-98.3 °F (36.8 °C)] 97.7 °F (36.5 °C)  Pulse:  [] 107  Resp:  [15-27] 20  BP: ()/(66-81) 116/76  SpO2:  [99 %-100 %] 100 %  HEENT: Moist mucous membranes. Extraocular muscles are intact.  Neck: supple no masses  Respiratory: Non labored, symmetric excursion, normal respirations  Cardiovascular: no irregularities in rhythm  Abdomen: Soft, nontender, nondistended.   Musculoskeletal: joints: no swelling   Wounds dressed  Labs:     COVID-19 Lab Results    COVID-19  Lab Results   Component Value Date    COVID19 Not Detected 2024    COVID19 Not Detected 08/10/2023    COVID19 Not Detected 2022       Pro-Calcitonin  No results for input(s): \"PCT\" in the last 168 hours.    Cardiac  No results for input(s): \"TROP\", \"PBNP\" in the last 168 hours.    Creatinine Kinase  No results for input(s): \"CK\" in the last 168 hours.    Inflammatory Markers  No results for input(s): \"CRP\", \"NATALIA\", \"LDH\", \"DDIMER\" in the last 168 hours.    Recent Labs   Lab 25  0719   RBC 3.43*   HGB 8.4*   HCT 27.1*   MCV 79.0*   MCH 24.5*   MCHC 31.0   RDW 17.7   NEPRELIM 3.72   WBC 5.4   .0         Recent Labs   Lab 25  0719   *   BUN 12   CREATSERUM 1.53*   CA 9.4      K 3.7      CO2 22.0       No results found for: \"VANCT\"  Microbiology    Hospital Encounter on 25   1. Tissue Aerobic Culture     Status: Abnormal    Collection Time: 25  6:57 AM    Specimen: Tissue    Result Value Ref Range    Tissue Culture Result One colony Pseudomonas aeruginosa (A) N/A    Tissue Smear No WBCs seen N/A    Tissue Smear No organisms seen N/A       Susceptibility    Pseudomonas aeruginosa -  (no method available)     Cefepime 8 Sensitive      Ceftazidime 16 Intermediate      Ciprofloxacin* <=1 Sensitive       * The current CLSI susceptibility breakpoint for ciprofloxacin is an RYAN of 0.5 mcg/mL. MICs above this should NOT be considered susceptible. Updated susceptibility reporting is in progress.     Meropenem <=1 Sensitive      Levofloxacin <=0.25 Sensitive      Piperacillin + Tazobactam* 32 Sensitive       * The current CLSI piperacillin-tazobactam susceptibility breakpoint is an RYAN of 16 mcg/mL. MICs above this should NOT be considered susceptible. Updated susceptibility reporting is in progress.     Tobramycin* 2 Sensitive       * The current CLSI susceptibility breakpoint for tobramycin is an RYAN of 1. MICs above this should NOT be considered susceptible. Updated susceptibility reporting is in progress.   2. Anaerobic Culture     Status: Abnormal    Collection Time: 05/05/25  6:57 AM    Specimen: Tissue   Result Value Ref Range    Anaerobic Culture 1+ growth Finegoldia magna (A) N/A           Problem list reviewed:  Problem List[1]          ASSESSMENT/PLAN:  1.  SP I/D superficial abscess right groin on 5/5, followed by integra graft on 5/7  Psuedomonas isolated in aerobic culture  Finegolda in anaerobic cultures    PO levaquin + flagyl for dc      Juan Churchill MD, MD [facility]  [phone]         [1]   Patient Active Problem List  Diagnosis    Hypercholesteremia    Benign essential HTN    Vitamin D deficiency    Adjustment disorder with mixed anxiety and depressed mood    Controlled type 2 diabetes mellitus without complication, without long-term current use of insulin (HCC)    Obesity (BMI 30-39.9)    Polyp of colon    Flat feet, bilateral    Myalgia  due to statin    Dissection of ascending aorta (HCC)    Stage 3 chronic kidney disease (HCC)    Iron deficiency    Constipation    Leg swelling    Gastroesophageal reflux disease    Dissection of aorta, unspecified portion of aorta (HCC)    Aneurysm of aortic arch    Sinus pause    Nasal congestion    Tachycardia-bradycardia (HCC)    Abscess of left groin    Heart block

## 2025-05-10 NOTE — CM/SW NOTE
05/10/25 1300   Discharge disposition   Expected discharge disposition Home-Health   Post Acute Care Provider   ([facility])   DME/Infusion Providers NYLA MACARIO acknowledges DC order. DC summary sent to [facility], will send AVS once available.     LETICIA Hartmann

## 2025-05-10 NOTE — DISCHARGE SUMMARY
Vascular Surgery Discharge Summary     Name: Alisha Wilde  MRN: [id number]  : 10/25/1965    Admission date: 2025  Discharge date: 05/10/25  Time required for discharge: < 15 minutes    Discharge diagnosis: right groin wound infection    Reason for Admission: same    Procedure:   Incision and drainage of superficial abscess 25    Non-excisional debridement of right groin wound with placement of integra micro and cytal graft 25    Hospital Course: Patient underwent the above procedures with Dr. Holder on the dates specified.  Her wound was related to percutaneous access for complex endovascular repair of an aortic dissection in the recent past.  Operative cultures grew Pseudomonas.  Infectious disease recommended Levaquin on discharge. By 5/10/2025, patient had obtained a home wound VAC and was deemed stable for discharge.  She has appointments set up with wound care 25 and Dr. Holder in 1-2 weeks.    Discharge Condition: Stable    Discharge Medications:   Current Discharge Medication List        START taking these medications    Details   metroNIDAZOLE 500 MG Oral Tab Take 1 tablet (500 mg total) by mouth 2 (two) times daily for 7 days.  Qty: 14 tablet, Refills: 0      levoFLOXacin 500 MG Oral Tab Take 1 tablet (500 mg total) by mouth daily for 10 days.  Qty: 10 tablet, Refills: 0           CONTINUE these medications which have NOT CHANGED    Details   furosemide 40 MG Oral Tab Take 1 tablet (40 mg total) by mouth daily.      clopidogrel 75 MG Oral Tab Take 1 tablet (75 mg total) by mouth daily.      thiamine 100 MG Oral Tab TAKE 1 TABLET DAILY WITH FEEDING TUBE  Qty: 90 tablet, Refills: 1      cloNIDine 0.1 MG Oral Tab Take 0.5 tablets (0.05 mg total) by mouth daily.      levocetirizine 5 MG Oral Tab Take 1 tablet (5 mg total) by mouth every evening.  Qty: 30 tablet, Refills: 1    Associated Diagnoses: Post-nasal drip      hydroCHLOROthiazide 12.5 MG Oral Tab Take 1 tablet (12.5 mg total) by  mouth daily.  Qty: 30 tablet, Refills: 2      hydrALAZINE 100 MG Oral Tab Take 1 tablet (100 mg total) by mouth in the morning, at noon, and at bedtime.  Qty: 90 tablet, Refills: 2      aspirin 81 MG Oral Tab EC Take 1 tablet (81 mg total) by mouth daily.  Qty: 30 tablet, Refills: 0      acetaminophen 500 MG Oral Tab Take 2 tablets (1,000 mg total) by mouth every 6 (six) hours as needed for Pain.      ezetimibe 10 MG Oral Tab Take 1 tablet (10 mg total) by mouth in the morning.      pantoprazole 40 MG Oral Tab EC Take 1 tablet (40 mg total) by mouth every morning before breakfast.  Qty: 90 tablet, Refills: 3    Associated Diagnoses: Gastroesophageal reflux disease, unspecified whether esophagitis present      bisacodyl 5 MG Oral Tab EC Take 1 tablet (5 mg total) by mouth daily as needed.  Qty: 30 tablet, Refills: 0    Associated Diagnoses: Constipation, unspecified constipation type      docusate sodium (COLACE) 100 MG Oral Cap Take 1 capsule (100 mg total) by mouth 2 (two) times daily as needed for constipation.  Qty: 180 capsule, Refills: 1    Associated Diagnoses: Constipation, unspecified constipation type      Ferrous Gluconate 324 (37.5 Fe) MG Oral Tab Take 1 tablet (324 mg total) by mouth daily.  Qty: 90 tablet, Refills: 1    Associated Diagnoses: Iron deficiency      azelastine 0.1 % Nasal Solution 1 spray by Nasal route 2 (two) times daily.  Qty: 3 each, Refills: 1    Associated Diagnoses: Nasal congestion      mirtazapine 7.5 MG Oral Tab 1 tablet (7.5 mg total) by Per G Tube route nightly.  Qty: 30 tablet, Refills: 0      amLODIPine 10 MG Oral Tab Take 1 tablet (10 mg total) by mouth daily.  Qty: 90 tablet, Refills: 3    Associated Diagnoses: Essential hypertension           STOP taking these medications       metoprolol succinate ER 25 MG Oral Tablet 24 Hr        amoxicillin 875 MG Oral Tab        sulfamethoxazole-trimethoprim -160 MG Oral Tab per tablet              Maria Dolores Vazquez MD  [facility]  Vascular Surgery

## 2025-05-10 NOTE — PROGRESS NOTES
NURSING DISCHARGE NOTE    Discharged Home via Wheelchair.  Accompanied by RN  Belongings Taken by patient/family.    Patient discharged to home. Wound vac in place at time of discharge. Discharge instructions given to and understood by patient. Appointments reviewed. All questions answered. Patient left unit with all belongings and in no signs or symptoms of distress.

## 2025-05-10 NOTE — PROGRESS NOTES
[facility]   part of [facility]     Hospitalist Progress Note     Alisha Wilde Patient Status:  Inpatient    10/25/1965 MRN [id number]   Location [facility] 2NE-A Attending Frank Holder MD   Hosp Day # 5 PCP Bridger Avendano MD     Chief Complaint: med management    Subjective:     Patient without acute events overnight. Pain controlled. No F/C. Hopes to go home.     Objective:    Review of Systems:   A comprehensive review of systems was completed; pertinent positive and negatives stated in subjective.    Vital signs:  Temp:  [97.1 °F (36.2 °C)-98.5 °F (36.9 °C)] 97.6 °F (36.4 °C)  Pulse:  [] 89  Resp:  [15-27] 18  BP: ()/(66-81) 123/71  SpO2:  [99 %-100 %] 100 %    Physical Exam:    General: No acute distress  Respiratory: No wheezes, no rhonchi  Cardiovascular: S1, S2, regular rate and rhythm  Abdomen: Soft, Non-tender, non-distended, positive bowel sounds  Neuro: No new focal deficits.   Extremities: No edema      Diagnostic Data:    Labs:  Recent Labs   Lab 25  0719   WBC 5.4   HGB 8.4*   MCV 79.0*   .0       Recent Labs   Lab 25  0719   *   BUN 12   CREATSERUM 1.53*   CA 9.4      K 3.7      CO2 22.0       Estimated Glomerular Filtration Rate: 38 mL/min/1.73m2 (A) (result from lab).    No results for input(s): \"TROP\", \"TROPHS\", \"CK\" in the last 168 hours.    No results for input(s): \"PTP\", \"INR\" in the last 168 hours.               Microbiology    Hospital Encounter on 25   1. Tissue Aerobic Culture     Status: Abnormal    Collection Time: 25  6:57 AM    Specimen: Tissue   Result Value Ref Range    Tissue Culture Result One colony Pseudomonas aeruginosa (A) N/A    Tissue Smear No WBCs seen N/A    Tissue Smear No organisms seen N/A       Susceptibility    Pseudomonas aeruginosa -  (no method available)     Cefepime 8 Sensitive      Ceftazidime 16 Intermediate      Ciprofloxacin* <=1 Sensitive       * The current CLSI susceptibility  breakpoint for ciprofloxacin is an RYAN of 0.5 mcg/mL. MICs above this should NOT be considered susceptible. Updated susceptibility reporting is in progress.     Meropenem <=1 Sensitive      Levofloxacin <=0.25 Sensitive      Piperacillin + Tazobactam* 32 Sensitive       * The current CLSI piperacillin-tazobactam susceptibility breakpoint is an RYAN of 16 mcg/mL. MICs above this should NOT be considered susceptible. Updated susceptibility reporting is in progress.     Tobramycin* 2 Sensitive       * The current CLSI susceptibility breakpoint for tobramycin is an RYAN of 1. MICs above this should NOT be considered susceptible. Updated susceptibility reporting is in progress.   2. Anaerobic Culture     Status: Abnormal    Collection Time: 05/05/25  6:57 AM    Specimen: Tissue   Result Value Ref Range    Anaerobic Culture 1+ growth Finegoldia magna (A) N/A         Imaging: Reviewed in Epic.    Medications:    metroNIDAZOLE  500 mg Oral BID    levoFLOXacin  500 mg Oral Daily    docusate sodium  100 mg Oral BID    fluticasone propionate  1 spray Each Nare Daily    amLODIPine  10 mg Oral Daily    ezetimibe  10 mg Oral Daily    pantoprazole  40 mg Oral QAM AC    aspirin  81 mg Oral Daily    hydroCHLOROthiazide  12.5 mg Oral Daily    hydrALAZINE  100 mg Oral TID    cloNIDine  0.05 mg Oral Daily    clopidogrel  75 mg Oral Daily    furosemide  40 mg Oral Daily    thiamine  100 mg Oral Daily    cetirizine  5 mg Oral Nightly    ferrous sulfate  325 mg Oral Daily with breakfast       Assessment & Plan:      #Right Groin superficial abscess  S/p I&D with Dr. Holder, and non exicisional debridement 5/7  Wound vac in place  Wound care eval  SCD for dvt proph  Cultures noted, plan for levaquin on DC     #Hx aortic arch dissection with aneurysm  #Prior Type A aortic dissection   #Hx left renal artery dissection   #Essential HTN  ASA, Plavix  Amlodipine, Clonidine, Lasix, Hydralazine, hydrochlorothiazide    #Heart Block  On  tele  Cardiology following  Stop BB  Plan TTM on DC     #CKD 3A  Monitor BMP     #TANYA     #GERD  PPI     #HLD  Patrice Kapoor MD    Supplementary Documentation:     Quality:  DVT Mechanical Prophylaxis:   SCDs,    DVT Pharmacologic Prophylaxis   Medication   None         DVT Pharmacologic prophylaxis: Aspirin 81 mg      Code Status: Full Code  Webb: External urinary catheter in place  Webb Duration (in days):   Central line:    BRANDON: 5/9/2025    Discharge is dependent on: progress  At this point Ms. Wilde is expected to be discharge to: home    The 21st Century Cures Act makes medical notes like these available to patients in the interest of transparency. Please be advised this is a medical document. Medical documents are intended to carry relevant information, facts as evident, and the clinical opinion of the practitioner. The medical note is intended as peer to peer communication and may appear blunt or direct. It is written in medical language and may contain abbreviations or verbiage that are unfamiliar.

## 2025-05-10 NOTE — PLAN OF CARE
Patient alert and oriented x 4. Up using cane On RA. NSR on tele. Continent of bowel and bladder. No complaints of pain, shortness of breath, or chest pain/discomfort. Wound vac in place. POC discussed with patient. Fall precautions in place. Call light within reach.     Problem: Patient/Family Goals  Goal: Patient/Family Long Term Goal  Description: Patient's Long Term Goal: Stay healthy  Interventions:- Resume home med regimen, follow up with providers- See additional Care Plan goals for specific interventions  Outcome: Progressing  Goal: Patient/Family Short Term Goal  Description: Patient's Short Term Goal: Discharge from hospital  5/9: To go home later today     Interventions: - Post op monitor, wound care to see, IV ancef  5/9:   -Tele monitoring  -Monitor labs  -ID consult 5/9   -PO antibiotics started 5/9  -Monitor right groin site   -Wound vac for home to be delivered 5/9     - See additional Care Plan goals for specific interventions  Outcome: Progressing     Problem: SKIN/TISSUE INTEGRITY - ADULT  Goal: Skin integrity remains intact  Description: INTERVENTIONS- Assess and document risk factors for pressure ulcer development- Assess and document skin integrity- Monitor for areas of redness and/or skin breakdown- Initiate interventions, skin care algorithm/standards of care as needed  Outcome: Progressing  Goal: Incision(s), wounds(s) or drain site(s) healing without S/S of infection  Description: INTERVENTIONS:- Assess and document risk factors for pressure ulcer development- Assess and document skin integrity- Assess and document dressing/incision, wound bed, drain sites and surrounding tissue- Implement wound care per orders- Initiate isolation precautions as appropriate- Initiate Pressure Ulcer prevention bundle as indicated  Outcome: Progressing  Goal: Oral mucous membranes remain intact  Description: INTERVENTIONS- Assess oral mucosa and hygiene practices- Implement preventative oral hygiene regimen-  Implement oral medicated treatments as ordered  Outcome: Progressing     Problem: PAIN - ADULT  Goal: Verbalizes/displays adequate comfort level or patient's stated pain goal  Description: INTERVENTIONS:- Encourage pt to monitor pain and request assistance- Assess pain using appropriate pain scale- Administer analgesics based on type and severity of pain and evaluate response- Implement non-pharmacological measures as appropriate and evaluate response- Consider cultural and social influences on pain and pain management- Manage/alleviate anxiety- Utilize distraction and/or relaxation techniques- Monitor for opioid side effects- Notify MD/LIP if interventions unsuccessful or patient reports new pain- Anticipate increased pain with activity and pre-medicate as appropriate  Outcome: Progressing

## 2025-05-11 NOTE — OPERATIVE REPORT
Pre-Operative Diagnosis: Superficial abscess of right groin post percutaneous procedure     Post-Operative Diagnosis: Superficial abscess of right groin post percutaneous procedure     Procedure Performed:   Incision and drainage of superficial abscess   Sharp excisional debridement of small phlegmon - total area 2x2x2 cm      Surgeons and Role:     * Frank Holder MD - Primary     Assistant(s):        Surgical Findings:   No exposed artery   Excised abscess and phlegmon cavity     Specimen: abscess and phlegmon cavity     Estimated Blood Loss: 10 mL     Brief history: This is a 59-year-old female who had a complex fenestrated repair of a dissection.  She returned to the office and was noted to have clear drainage from her percutaneous access on the right.  Ultrasound did not demonstrate any involvement of the artery but there was a concern for a abscess collection in the superficial tissues.  We are proceeding with incision and drainage as described below.    Details of procedure: Patient was taken the operating room and placed under general anesthesia.  Patient was prepped and draped in usual sterile fashion timeout performed.  I made a transverse incision over the palpable centimeter mass going through skin subcutaneous fat immediately encountered the abscess and fluid drained and was sent for culture.  I then used scissors and the knife to cut away the phlegmon tissue and a sharp excisional debridement fashion for a total area of 2 x 2 x 2 cm.  This tissue was sent for culture.  Hemostasis was achieved with Bovie electrocautery.  Wound was copiously irrigated.  There was no evidence of involvement of the artery and no exposure of the artery.  Wound VAC was applied and the patient was taken recovery in stable condition.

## 2025-05-11 NOTE — OPERATIVE REPORT
Pre-Operative Diagnosis: Right groin wound status post incision and debridement     Post-Operative Diagnosis: Right groin wound status post incision and debridement     Procedure Performed:   Non-excisional debridement of right groin wound 3x3x2 cm  Placement of integra micro and cytal graft     Surgeons and Role:     * Frank Holder MD - Primary     Assistant(s):  Surgical Assistant.: Yaw Rojas, RSA     Surgical Findings: healthy tissue without infection     Specimen: none     Estimated Blood Loss: 50 mL     Brief history: This is a 59-year-old female status post incision and drainage of a right groin abscess who returns to the operating room for additional debridement and placement of Integra graft.    Details of procedure: Patient was taken to the operating room and placed under general anesthesia.  Patient was prepped and draped in usual sterile fashion timeout performed.  Using a knife I did a nonexcisional debridement of the surrounding subcutaneous tissues to make sure there is no necrotic tissue that was remaining.  I then copiously irrigated with Irrisept and used Bovie for cautery purposes.  After achieving hemostasis, I then placed Integra micro graft into the base of the wound and then sewed a 5 x 5 Integra cytal graft to the edges of the wound.  Wound VAC was applied.  Patient was awakened from anesthesia and taken recovery.

## 2025-05-12 ENCOUNTER — APPOINTMENT (OUTPATIENT)
Dept: CARDIAC REHAB | Facility: HOSPITAL | Age: 60
End: 2025-05-12
Attending: INTERNAL MEDICINE
Payer: COMMERCIAL

## 2025-05-12 ENCOUNTER — TELEPHONE (OUTPATIENT)
Dept: WOUND CARE | Facility: HOSPITAL | Age: 60
End: 2025-05-12

## 2025-05-12 NOTE — TELEPHONE ENCOUNTER
Returned call to patient.  She states the vac is running fine she had left a message over the weekend because she did not observe much drainage.  HH nurse to visit today.  Writer reminded patient not to let HH remove the dressing as instructed in Dr. Holder discharge orders.  Patient to call Dr. Holder's office today and make a follow up appointment.  Reminded patient of wound clinic appointment this Friday.

## 2025-05-12 NOTE — TELEPHONE ENCOUNTER
RVM from patient informing clinic that the earliest appointment she could schedule with vascular surgery is 5/29. She is concerned because Dr. Holder wants the wound vac to remain in place until he sees it but she has a wound clinic appointment scheduled Friday 5/16. Returned call to patient and informed her that Danika and Dr. Holder will coordinate on her care and that she should come to her 5/16 appointment. Patient voices understanding. She voices some concern that her wound vac has a smell. Informed that some odor is probably just the drainage in the cannister, but that if she notes that it is worsening to reach out to vascular surgery's office or have wound evaluated in ED. Pt voices understanding.    Received VM from [facility] RN requesting confirmation that wound vac is to remain in place this week. Confirmation given, wound vac to remain in place until Friday's appointment.

## 2025-05-14 ENCOUNTER — APPOINTMENT (OUTPATIENT)
Dept: CARDIAC REHAB | Facility: HOSPITAL | Age: 60
End: 2025-05-14
Attending: INTERNAL MEDICINE
Payer: COMMERCIAL

## 2025-05-14 NOTE — PAYOR COMM NOTE
--------------  DISCHARGE REVIEW    Payor: [employer] CHO[employer]/H[employer]/POS/EPO  Subscriber #:  [id number]  Authorization Number: [id number]    Admit date: 25  Admit time:   5:25 AM  Discharge Date: 5/10/2025  5:42 PM     Admitting Physician: Frank Holder MD  Attending Physician:  No att. providers found  Primary Care Physician: Bridger Avendano MD          Discharge Summary Notes        Discharge Summary signed by Maria Dolores Vazquez MD at 5/10/2025 12:16 PM       Author: Maria Dolores Vazquez MD Specialty: SURGERY, VASCULAR Author Type: Physician    Filed: 5/10/2025 12:16 PM Date of Service: 5/10/2025 12:11 PM Status: Signed    : Maria Dolores Vazquez MD (Physician)           Vascular Surgery Discharge Summary     Name: Alisha Wilde  MRN: [id number]  : 10/25/1965    Admission date: 2025  Discharge date: 05/10/25  Time required for discharge: < 15 minutes    Discharge diagnosis: right groin wound infection    Reason for Admission: same    Procedure:   Incision and drainage of superficial abscess 25    Non-excisional debridement of right groin wound with placement of integra micro and cytal graft 25    Hospital Course: Patient underwent the above procedures with Dr. Holder on the dates specified.  Her wound was related to percutaneous access for complex endovascular repair of an aortic dissection in the recent past.  Operative cultures grew Pseudomonas.  Infectious disease recommended Levaquin on discharge. By 5/10/2025, patient had obtained a home wound VAC and was deemed stable for discharge.  She has appointments set up with wound care 25 and Dr. Holder in 1-2 weeks.    Discharge Condition: Stable    Discharge Medications:   Current Discharge Medication List        START taking these medications    Details   metroNIDAZOLE 500 MG Oral Tab Take 1 tablet (500 mg total) by mouth 2 (two) times daily for 7 days.  Qty: 14 tablet, Refills: 0      levoFLOXacin 500 MG Oral Tab Take 1 tablet (500 mg total) by  mouth daily for 10 days.  Qty: 10 tablet, Refills: 0           CONTINUE these medications which have NOT CHANGED    Details   furosemide 40 MG Oral Tab Take 1 tablet (40 mg total) by mouth daily.      clopidogrel 75 MG Oral Tab Take 1 tablet (75 mg total) by mouth daily.      thiamine 100 MG Oral Tab TAKE 1 TABLET DAILY WITH FEEDING TUBE  Qty: 90 tablet, Refills: 1      cloNIDine 0.1 MG Oral Tab Take 0.5 tablets (0.05 mg total) by mouth daily.      levocetirizine 5 MG Oral Tab Take 1 tablet (5 mg total) by mouth every evening.  Qty: 30 tablet, Refills: 1    Associated Diagnoses: Post-nasal drip      hydroCHLOROthiazide 12.5 MG Oral Tab Take 1 tablet (12.5 mg total) by mouth daily.  Qty: 30 tablet, Refills: 2      hydrALAZINE 100 MG Oral Tab Take 1 tablet (100 mg total) by mouth in the morning, at noon, and at bedtime.  Qty: 90 tablet, Refills: 2      aspirin 81 MG Oral Tab EC Take 1 tablet (81 mg total) by mouth daily.  Qty: 30 tablet, Refills: 0      acetaminophen 500 MG Oral Tab Take 2 tablets (1,000 mg total) by mouth every 6 (six) hours as needed for Pain.      ezetimibe 10 MG Oral Tab Take 1 tablet (10 mg total) by mouth in the morning.      pantoprazole 40 MG Oral Tab EC Take 1 tablet (40 mg total) by mouth every morning before breakfast.  Qty: 90 tablet, Refills: 3    Associated Diagnoses: Gastroesophageal reflux disease, unspecified whether esophagitis present      bisacodyl 5 MG Oral Tab EC Take 1 tablet (5 mg total) by mouth daily as needed.  Qty: 30 tablet, Refills: 0    Associated Diagnoses: Constipation, unspecified constipation type      docusate sodium (COLACE) 100 MG Oral Cap Take 1 capsule (100 mg total) by mouth 2 (two) times daily as needed for constipation.  Qty: 180 capsule, Refills: 1    Associated Diagnoses: Constipation, unspecified constipation type      Ferrous Gluconate 324 (37.5 Fe) MG Oral Tab Take 1 tablet (324 mg total) by mouth daily.  Qty: 90 tablet, Refills: 1    Associated  Diagnoses: Iron deficiency      azelastine 0.1 % Nasal Solution 1 spray by Nasal route 2 (two) times daily.  Qty: 3 each, Refills: 1    Associated Diagnoses: Nasal congestion      mirtazapine 7.5 MG Oral Tab 1 tablet (7.5 mg total) by Per G Tube route nightly.  Qty: 30 tablet, Refills: 0      amLODIPine 10 MG Oral Tab Take 1 tablet (10 mg total) by mouth daily.  Qty: 90 tablet, Refills: 3    Associated Diagnoses: Essential hypertension           STOP taking these medications       metoprolol succinate ER 25 MG Oral Tablet 24 Hr        amoxicillin 875 MG Oral Tab        sulfamethoxazole-trimethoprim -160 MG Oral Tab per tablet              Maria Dolores Vazquez MD  [facility]  Vascular Surgery    Electronically signed by Maria Dolores Vazquez MD on 5/10/2025 12:16 PM         Requested by Dr. 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  She developed a superficial abscess of the right groin and underwent an I/D with excisional debridement on 5/5, followed by an integra graft on 5/7.  One colony of Pseudomonas aeruginosa was isolated from the tissue culture on 5/5.   Anaerobic cultures were no growth to date, not final.          History:  [Past Medical History]    [Past Medical History]   Arthritis    Chronic kidney disease (CKD)    Esophageal reflux    High blood pressure    High cholesterol    Hyperlipidemia    HYPERTENSION    Hypertension    Unspecified essential hypertension     [Past Surgical History]    [Past Surgical History]        Procedure Laterality Date    Anesth,open heart surgery   11/23/2024    Colonoscopy N/A 06/02/2018     Procedure: COLONOSCOPY;  Surgeon: Magdy Webber MD;  Location: [facility] ENDOSCOPY    Colonoscopy   6/2/2018    Endometrial ablation   2007     child passed away  for 5 mins             1    Other surgical history   2011     cysto-Dr. Lacey -- pt denies     [Family History]    [Family History]        Problem Relation Age of Onset    Hypertension Mother      Heart Disorder Mother 70    Other (Other) Mother           kidney failure    Hypertension Father      Hypertension Maternal Grandfather      Cancer Neg      Diabetes Neg      Glaucoma Neg      Macular degeneration Neg        reports that she quit smoking about 26 years ago. Her smoking use included cigarettes. She started smoking about 39 years ago. She has a 13 pack-year smoking history. She has quit using smokeless tobacco. She reports that she does not currently use alcohol after a past usage of about 1.0 - 2.0 standard drink of alcohol per week. She reports that she does not use drugs.        Allergies:  [Allergies]    [Allergies]       Allergen Reactions    Atorvastatin MYALGIA    Pravastatin MYALGIA    Rosuvastatin MYALGIA    Seasonal Runny nose        Medications:  [Current Hospital Medications]    [Current Hospital Medications]     Current Facility-Administered Medications:     docusate sodium (Colace) cap 100 mg, 100 mg, Oral, BID    polyethylene glycol (PEG 3350) (Miralax) 17 g oral packet 17 g, 17 g, Oral, Daily PRN    magnesium hydroxide (Milk of Magnesia) 400 MG/5ML oral suspension 30 mL, 30 mL, Oral, Daily PRN    bisacodyl (Dulcolax) 10 MG rectal suppository 10 mg, 10 mg, Rectal, Daily PRN    fleet enema (Fleet) rectal enema 133 mL, 1 enema, Rectal, Once PRN    bisacodyl (Dulcolax) DR tab 5 mg, 5 mg, Oral, Daily PRN    fluticasone propionate (Flonase) 50 MCG/ACT nasal suspension 1 spray, 1 spray, Each Nare, Daily    amLODIPine (Norvasc) tab 10 mg, 10 mg, Oral, Daily    docusate sodium (Colace) cap 100 mg, 100 mg, Oral, BID PRN    ezetimibe (Zetia) tab 10 mg, 10 mg, Oral, Daily    pantoprazole (Protonix) DR tab 40 mg, 40 mg, Oral, QAM AC    aspirin DR tab 81 mg, 81 mg, Oral, Daily     hydroCHLOROthiazide tab 12.5 mg, 12.5 mg, Oral, Daily    hydrALAZINE (Apresoline) tab 100 mg, 100 mg, Oral, TID    cloNIDine (Catapres) partial tab 0.05 mg, 0.05 mg, Oral, Daily    clopidogrel (Plavix) tab 75 mg, 75 mg, Oral, Daily    furosemide (Lasix) tab 40 mg, 40 mg, Oral, Daily    acetaminophen (Tylenol) tab 650 mg, 650 mg, Oral, Q4H PRN **OR** HYDROcodone-acetaminophen (Norco) 5-325 MG per tab 1 tablet, 1 tablet, Oral, Q4H PRN **OR** HYDROcodone-acetaminophen (Norco) 5-325 MG per tab 2 tablet, 2 tablet, Oral, Q4H PRN    ondansetron (Zofran) 4 MG/2ML injection 4 mg, 4 mg, Intravenous, Q6H PRN    prochlorperazine (Compazine) 10 MG/2ML injection 5 mg, 5 mg, Intravenous, Q8H PRN    thiamine (Vitamin B1) tab 100 mg, 100 mg, Oral, Daily    mirtazapine (Remeron) tab 7.5 mg, 7.5 mg, Per G Tube, Nightly PRN    cetirizine (ZyrTEC) tab 5 mg, 5 mg, Oral, Nightly    ferrous sulfate DR tab 325 mg, 325 mg, Oral, Daily with breakfast    melatonin tab 3 mg, 3 mg, Oral, Nightly PRN    glycerin-hypromellose- (Artificial Tears) 0.2-0.2-1 % ophthalmic solution 1 drop, 1 drop, Both Eyes, QID PRN    sodium chloride (Saline Mist) 0.65 % nasal solution 1 spray, 1 spray, Each Nare, Q3H PRN  [Medications Ordered Prior to Encounter]    [Medications Ordered Prior to Encounter]            Current Facility-Administered Medications on File Prior to Encounter   Medication Dose Route Frequency Provider Last Rate Last Admin    [COMPLETED] hydrALAZINE (Apresoline) tab 25 mg  25 mg Oral Once Carson Curran APRN   25 mg at 04/13/25 1125    [COMPLETED] hydrALAzine (Apresoline) 20 mg/mL injection 10 mg  10 mg Intravenous Once Vikki Kwan APRN   10 mg at 04/12/25 2012    [COMPLETED] potassium chloride (Klor-Con M20) tab 40 mEq  40 mEq Oral Once Danette Atwood, DO   40 mEq at 04/10/25 0829    [COMPLETED] potassium phosphate dibasic 15 mmol in sodium chloride 0.9% 250 mL IVPB  15 mmol Intravenous Once Bunny Atwood MD 62.5  mL/hr at 25 0641 15 mmol at 25 0641     Followed by    [COMPLETED] potassium chloride 20 mEq/100mL IVPB premix 20 mEq  20 mEq Intravenous Once Bunny Atwood MD 50 mL/hr at 25 1009 20 mEq at 25 1009    [COMPLETED] piperacillin-tazobactam (Zosyn) 3.375 g in dextrose 5% 100 mL IVPB-ADDV  3.375 g Intravenous Q8H Frank Holder MD 25 mL/hr at 25 1808 3.375 g at 25 1808    [COMPLETED] potassium chloride 20 mEq/100mL IVPB premix 20 mEq  20 mEq Intravenous Once Jade Cameron MD 50 mL/hr at 25 1042 20 mEq at 25 1042    [COMPLETED] sodium chloride 0.9% infusion   Intravenous Once Woodrow Boswell MD 10 mL/hr at 25 1606 New Bag at 25 1606    [] ondansetron (Zofran) 4 MG/2ML injection 4 mg  4 mg Intravenous PRN Anshul Olvera MD        [] metoclopramide (Reglan) 5 mg/mL injection 5 mg  5 mg Intravenous PRN Anshul Olvera MD        [] dexamethasone (Decadron) 4 MG/ML injection 4 mg  4 mg Intravenous PRN Anshul Olvera MD        [] trimethobenzamide (Tigan) 100 mg/mL injection 200 mg  200 mg Intramuscular PRN Anshul Olvera MD        [] HYDROmorphone (Dilaudid) 1 MG/ML injection 0.4 mg  0.4 mg Intravenous Q5 Min PRN Anshul Olvera MD        [] HYDROcodone-acetaminophen (Norco) 5-325 MG per tab 1 tablet  1 tablet Oral Once PRN Anshul Olvera MD         Or    [] HYDROcodone-acetaminophen (Norco) 5-325 MG per tab 2 tablet  2 tablet Oral Once PRN Anshul Olvera MD        [] atropine 0.1 MG/ML injection 0.5 mg  0.5 mg Intravenous PRN Anshul Olvera MD        [] naloxone (Narcan) 0.4 MG/ML injection 0.08 mg  0.08 mg Intravenous PRN Anshul Olvera MD        [] diphenhydrAMINE (Benadryl) 50 mg/mL  injection 12.5 mg  12.5 mg Intravenous PRN Anshul Olvera MD        [COMPLETED] potassium phosphate dibasic 15  mmol in sodium chloride 0.9% 250 mL IVPB  15 mmol Intravenous Once Stefany Mccall MD 62.5 mL/hr at 259 15 mmol at 25    [COMPLETED] potassium chloride 40 mEq in 250mL sodium chloride 0.9% IVPB premix  40 mEq Intravenous Once Tia Praker MD 62.5 mL/hr at 25 0507 40 mEq at 25 0507    [COMPLETED] potassium chloride 40 mEq in 250mL sodium chloride 0.9% IVPB premix  40 mEq Intravenous Once Jade Cameron MD 62.5 mL/hr at 25 1004 40 mEq at 25 1004    [COMPLETED] iopamidol 76% (ISOVUE-370) injection for power injector  100 mL Intravenous ONCE PRN Jade Cameron MD   100 mL at 25 1300    [COMPLETED] heparin (Porcine) 1000 UNIT/ML injection - BOLUS IV 5,000 Units  5,000 Units Intravenous Once Sb Campbell MD   5,000 Units at 25 1450    [COMPLETED] heparin (Porcine) 01007 units/250 mL infusion (ACS/AFIB) INITIAL DOSE  1,000 Units/hr Intravenous Once Sb Campbell MD 10 mL/hr at 25 1453 1,000 Units/hr at 25 1453    [COMPLETED] potassium chloride 20 mEq/100mL IVPB premix 20 mEq  20 mEq Intravenous Once Bunny Atwood MD 50 mL/hr at 25 0506 20 mEq at 25 0506    [COMPLETED] bisacodyl (Dulcolax) 10 MG rectal suppository 10 mg  10 mg Rectal Once Frank Holder MD   10 mg at 25 1103    [COMPLETED] furosemide (Lasix) 10 mg/mL injection 40 mg  40 mg Intravenous Once Bunny Atwood MD   40 mg at 25 0055    [COMPLETED] verapamil (Isoptin) 2.5 mg/mL injection                [COMPLETED] Nitroglycerin in D5W 200-5 MCG/ML-% injection                [] lactated ringers IV bolus 500 mL  500 mL Intravenous Once PRN Tanner Coffey MD        [] atropine 0.1 MG/ML injection 0.5 mg  0.5 mg Intravenous PRN Tanner Coffey MD        [] naloxone (Narcan) 0.4 MG/ML injection 0.08 mg  0.08 mg Intravenous PRN Tanner Coffey MD        [] fentaNYL (Sublimaze) 50 mcg/mL  injection 25 mcg  25 mcg Intravenous Q5 Min PRN Tanner Coffey MD   25 mcg at 258     Or    [] fentaNYL (Sublimaze) 50 mcg/mL injection 50 mcg  50 mcg Intravenous Q5 Min PRN Tanner Coffey MD   50 mcg at 25 2249    [] HYDROmorphone (Dilaudid) 1 MG/ML injection 0.2 mg  0.2 mg Intravenous Q5 Min PRN Tanner Coffey MD         Or    [] HYDROmorphone (Dilaudid) 1 MG/ML injection 0.4 mg  0.4 mg Intravenous Q5 Min PRN Tanner Coffey MD   0.4 mg at 259     Or    [] HYDROmorphone (Dilaudid) 1 MG/ML injection 0.6 mg  0.6 mg Intravenous Q5 Min PRN Tanner Coffey MD        [COMPLETED] iodixanol (VISIPAQUE) 320 MG/ML injection 150 mL  150 mL Injection ONCE PRN Magdy Palm MD   250 mL at 25    [COMPLETED] ceFAZolin (Ancef) 2g in 10mL IV syringe premix  2 g Intravenous Q8H Magdy Palm  mL/hr at 25 1104 2 g at 25 1104    [COMPLETED] furosemide (Lasix) 10 mg/mL injection 40 mg  40 mg Intravenous Once Bunny Atwood MD   40 mg at 25 2136    [COMPLETED] iopamidol 76% (ISOVUE-370) injection for power injector  100 mL Intravenous ONCE PRN Sumit Kapoor MD   100 mL at 25 0924    [COMPLETED] iopamidol 76% (ISOVUE-370) injection for power injector  80 mL Intravenous ONCE PRN Matheus Hayes MD   80 mL at 25 0833    [] nitroGLYCERIN in dextrose 5% 50 mg/250mL infusion premix                [COMPLETED] lidocaine (Xylocaine) 1 % injection  10 mL Intradermal Once Woodrow Boswell MD   10 mL at 25 1559    [COMPLETED] potassium phosphate dibasic 15 mmol in sodium chloride 0.9% 250 mL IVPB  15 mmol Intravenous Once Woodrow Boswell MD 62.5 mL/hr at 25 15 mmol at 25     Followed by    [COMPLETED] potassium chloride 20 mEq/100mL IVPB premix 20 mEq  20 mEq Intravenous Once Woodrow Boswell MD 50 mL/hr at 25 0002 20 mEq at 25 0002             Current Outpatient Medications  on File Prior to Encounter   Medication Sig Dispense Refill    furosemide 40 MG Oral Tab Take 1 tablet (40 mg total) by mouth daily.        clopidogrel 75 MG Oral Tab Take 1 tablet (75 mg total) by mouth daily.        thiamine 100 MG Oral Tab TAKE 1 TABLET DAILY WITH FEEDING TUBE (Patient taking differently: Take 1 tablet (100 mg total) by mouth daily.) 90 tablet 1    cloNIDine 0.1 MG Oral Tab Take 0.5 tablets (0.05 mg total) by mouth daily.        metoprolol succinate ER 25 MG Oral Tablet 24 Hr Take 1 tablet (25 mg total) by mouth daily.        levocetirizine 5 MG Oral Tab Take 1 tablet (5 mg total) by mouth every evening. 30 tablet 1    amoxicillin 875 MG Oral Tab Take 1 tablet (875 mg total) by mouth 2 (two) times daily for 10 days. 20 tablet 0    hydroCHLOROthiazide 12.5 MG Oral Tab Take 1 tablet (12.5 mg total) by mouth daily. 30 tablet 2    hydrALAZINE 100 MG Oral Tab Take 1 tablet (100 mg total) by mouth in the morning, at noon, and at bedtime. 90 tablet 2    aspirin 81 MG Oral Tab EC Take 1 tablet (81 mg total) by mouth daily. 30 tablet 0    acetaminophen 500 MG Oral Tab Take 2 tablets (1,000 mg total) by mouth every 6 (six) hours as needed for Pain.        ezetimibe 10 MG Oral Tab Take 1 tablet (10 mg total) by mouth in the morning.        pantoprazole 40 MG Oral Tab EC Take 1 tablet (40 mg total) by mouth every morning before breakfast. 90 tablet 3    bisacodyl 5 MG Oral Tab EC Take 1 tablet (5 mg total) by mouth daily as needed. 30 tablet 0    docusate sodium (COLACE) 100 MG Oral Cap Take 1 capsule (100 mg total) by mouth 2 (two) times daily as needed for constipation. 180 capsule 1    Ferrous Gluconate 324 (37.5 Fe) MG Oral Tab Take 1 tablet (324 mg total) by mouth daily. 90 tablet 1    azelastine 0.1 % Nasal Solution 1 spray by Nasal route 2 (two) times daily. (Patient taking differently: 1 spray by Nasal route daily as needed for Rhinitis.) 3 each 1    mirtazapine 7.5 MG Oral Tab 1 tablet (7.5 mg  total) by Per G Tube route nightly. (Patient taking differently: 1 tablet (7.5 mg total) by Per G Tube route as needed.) 30 tablet 0    amLODIPine 10 MG Oral Tab Take 1 tablet (10 mg total) by mouth daily. 90 tablet 3    sulfamethoxazole-trimethoprim -160 MG Oral Tab per tablet Take 1 tablet by mouth 2 (two) times daily.            Review of Systems:     A comprehensive 10 point review of systems was completed.  Pertinent positives and negatives noted in the the HPI.        Physical Exam:     General: No acute distress. Alert and oriented x 3.  Vital signs: Temp:  [97.4 °F (36.3 °C)-98.6 °F (37 °C)] 98 °F (36.7 °C)  Pulse:  [] 84  Resp:  [18-31] 20  BP: (108-137)/(69-85) 137/78  SpO2:  [94 %-100 %] 100 %  Body mass index is 36.14 kg/m².  HEENT: Moist mucous membranes. Extraocular muscles are intact.  Neck: No swelling, no masses  Respiratory: Non labored, symmetric exursion  Cardiovascular: No irregularities in rhythm  Abdomen: Soft, nontender, nondistended.    Right groin covered with wound vac  Musculoskeletal: Full range of motion of all extremities.  No swelling noted.  Joints: no effusions  Skin: No lesions. No erythema, no open wounds     Laboratory Data:  Laboratory data reviewed            Recent Labs   Lab 05/06/25  0719   RBC 3.43*   HGB 8.4*   HCT 27.1*   MCV 79.0*   MCH 24.5*   MCHC 31.0   RDW 17.7   NEPRELIM 3.72   WBC 5.4   .0             Recent Labs   Lab 05/06/25  0719   *   BUN 12   CREATSERUM 1.53*   CA 9.4      K 3.7      CO2 22.0                  Microbiologic Data:           Hospital Encounter on 05/05/25   1. Tissue Aerobic Culture     Status: Abnormal     Collection Time: 05/05/25  6:57 AM     Specimen: Tissue   Result Value Ref Range     Tissue Culture Result One colony Pseudomonas aeruginosa (A) N/A     Tissue Smear No WBCs seen N/A     Tissue Smear No organisms seen N/A       Susceptibility     Pseudomonas aeruginosa -  (no method available)        Cefepime 8 Sensitive         Ceftazidime 16 Intermediate         Ciprofloxacin* <=1 Sensitive          * The current CLSI susceptibility breakpoint for ciprofloxacin is an RYAN of 0.5 mcg/mL. MICs above this should NOT be considered susceptible. Updated susceptibility reporting is in progress.       Meropenem <=1 Sensitive         Levofloxacin <=0.25 Sensitive         Piperacillin + Tazobactam* 32 Sensitive          * The current CLSI piperacillin-tazobactam susceptibility breakpoint is an RYAN of 16 mcg/mL. MICs above this should NOT be considered susceptible. Updated susceptibility reporting is in progress.       Tobramycin* 2 Sensitive          * The current CLSI susceptibility breakpoint for tobramycin is an RYAN of 1. MICs above this should NOT be considered susceptible. Updated susceptibility reporting is in progress.   2. Anaerobic Culture     Status: None (Preliminary result)     Collection Time: 05/05/25  6:57 AM     Specimen: Tissue   Result Value Ref Range     Anaerobic Culture No Anaerobes to date N/A            Established Problem list:  [Problem List]    [Problem List]  Patient Active Problem List      Diagnosis    Hypercholesteremia    Benign essential HTN    Vitamin D deficiency    Adjustment disorder with mixed anxiety and depressed mood    Controlled type 2 diabetes mellitus without complication, without long-term current use of insulin (HCC)    Obesity (BMI 30-39.9)    Polyp of colon    Flat feet, bilateral    Myalgia due to statin    Dissection of ascending aorta (HCC)    Stage 3 chronic kidney disease (HCC)    Iron deficiency    Constipation    Leg swelling    Gastroesophageal reflux disease    Dissection of aorta, unspecified portion of aorta (HCC)    Aneurysm of aortic arch    Sinus pause    Nasal congestion    Tachycardia-bradycardia (HCC)    Abscess of left groin    Heart block        ASSESSMENT/PLAN:  1. SP I/D superficial abscess right groin on 5/5, followed by integra graft on  5/7  Psuedomonas isolated, no anaerobes to date     Sensitivities noted  PO Levaquin for DC              Juan Churchill MD, MD  [facility]  [phone]         Electronically signed by Juan Churchill MD at 5/9/2025 11:52 AM

## 2025-05-15 ENCOUNTER — ORDER TRANSCRIPTION (OUTPATIENT)
Dept: ADMINISTRATIVE | Facility: HOSPITAL | Age: 60
End: 2025-05-15

## 2025-05-15 DIAGNOSIS — Z13.9 SCREENING FOR CONDITION: Primary | ICD-10-CM

## 2025-05-15 NOTE — PROGRESS NOTES
CHIEF COMPLAINT:     Chief Complaint   Patient presents with    Wound Care     Patient arrives for an initial wound care visit. Patient first got the wound when she went in for stent placement. Patient arrives with a wound vac on a right groin wound. Patient has not had the wound vac changed in \"eight days.\" Per patient, home health was not allowed to change the wound vac. Patient is currently on antibiotics. The stent placement was roughly May 5th, per patient.      HPI:   Information obtained from patient and chart  5-16-25 INITIAL:  58 YO female with htn, obesity, hypercholesterolemia, and adjustment disorder under went a percutaneous access for complex endovascular repair of an aortic dissection .  She developed a right groin infection.  She was admitted from 5-5 to 5-10.  On 5-5-25 she underwent a superficial abscess I&D, followed by debridement of right groin with placement of integra on 5-7-25.  Operative cultures grew pseudomonas and infectious disease recommended levaquin on discharge.  She is presenting today with wound vac in place.  She states she has a couple days of abx left.  There is not any s/s of infection noted. Patient is anxious. She is ambulatory.    MEDICATIONS:   Medications - Current[1]  ALLERGIES:   Allergies[2]   REVIEW OF SYSTEMS:   This information was obtained from the patient/family and chart.    See HPI for pertinent positives, otherwise 10 pt ROS negative.    HISTORY:   Past Medical History[3]  Past Surgical History[4]   Short Social Hx on File[5]  PHYSICAL EXAM:     Vitals:    05/16/25 0903   BP: 113/59   Pulse: 86   Resp: 16   Temp: 98.3 °F (36.8 °C)   TempSrc: Temporal   Weight: 210 lb (95.3 kg)   Height: 65\"      Estimated body mass index is 34.95 kg/m² as calculated from the following:    Height as of this encounter: 65\".    Weight as of this encounter: 210 lb (95.3 kg).   POC Glucose   Date Value Ref Range Status   05/07/2025 107 (H) 70 - 99 mg/dL Final   04/09/2025 103 (H) 70  - 99 mg/dL Final   04/01/2025 127 (H) 70 - 99 mg/dL Final       Vital signs reviewed.Appears stated age, well groomed.    Constitutional:  Bp wnl for patient. Pulse Regular and wnl for patient. Respirations easy and unlabored. Temperature wnl. Obese.  Appearance neat and clean. Appears in no acute distress. Well nourished and well developed.    Cardiovascular:  Heart rhythm and rate irregular, without murmur or gallop.     Musculoskeletal:  Gait and station stable   Integumentary:  refer to wound characteristics and images   Psychiatric:  Judgment and insight intact. Alert and oriented times 3. No evidence of depression, anxiety, or agitation. Calm, cooperative, and communicative.   DIAGNOSTICS:     Lab Results   Component Value Date    BUN 12 05/06/2025    CREATSERUM 1.53 (H) 05/06/2025    ALB 4.1 04/14/2025    TP 7.3 04/14/2025    A1C 5.9 (H) 04/05/2025       WOUND ASSESSMENT:     Negative Pressure Wound Therapy Leg Anterior;Proximal;Right;Upper (Active)   Placement Date/Time: 05/07/25 1424   Inserted by: DR. ESQUIVEL  Location: Leg  Wound Location Orientation: Anterior;Proximal;Right;Upper      Assessments 5/7/2025  2:39 PM 5/16/2025  9:52 AM   Wound photographed/measured -- Yes   Machine Status (On) -- Yes   Site Assessment Clean;Dry;Intact --   Pamela-wound Assessment -- Clean;Moist   Unit Type VAC Ultra KCI/3M   Dressing Type Black foam Black foam   Number of Foam Pieces Used -- 1   Cycle -- Continuous   Target Pressure (mmHg) 125 125   Drainage Description -- Serosanguineous   Dressing Status Clean;Dry;Intact --   Canister Changed -- Yes       No associated orders.       Wound 05/16/25 #1 Groin Right (Active)   Date First Assessed/Time First Assessed: 05/16/25 0842    Wound Number (Wound Clinic Only): #1  Primary Wound Type: Old surgical  Location: Groin  Wound Location Orientation: Right      Assessments 5/16/2025  8:50 AM   Wound Image     Drainage Amount Large   Drainage Description Sanguineous   Treatments  Wound Vac - Neg Pressure   Wound Vac Brand KCI   Wound Length (cm) 0.4 cm (slight stretch)   Wound Width (cm) 3.2 cm   Wound Surface Area (cm^2) 1.01 cm^2   Wound Depth (cm) 0.9 cm   Wound Volume (cm^3) 0.603 cm^3   Margins Well-defined edges   Non-staged Wound Description Full thickness   Pamela-wound Assessment Edema;Induration   Wound Granulation Tissue Pink;Spongy;Red   Wound Bed Granulation (%) 60 %   Wound Bed Slough (%) 10 %   Shape 30% integrated skin sub, 7 clear sutures noted to periwound       No associated orders.     Negative Pressure Wound Therapy Leg Anterior;Proximal;Right;Upper (Active)   05/07/25 1424 Leg   Placed by External Staff?:    Inserted by: DR. ESQUIVEL   Wound Type:    Wound Location Orientation: Anterior;Proximal;Right;Upper   Removal Reason:    Wound photographed/measured Yes 05/16/25 0952   Machine Status (On) Yes 05/16/25 0952   Pamela-wound Assessment Clean;Moist 05/16/25 0952   Unit Type KCI/3M 05/16/25 0952   Dressing Type Black foam 05/16/25 0952   Number of Foam Pieces Used 1 05/16/25 0952   Cycle Continuous 05/16/25 0952   Target Pressure (mmHg) 125 05/16/25 0952   Drainage Description Serosanguineous 05/16/25 0952   Canister Changed Yes 05/16/25 0952           ASSESSMENT AND PLAN:      1. Disruption of external surgical wound, sequela    2. Pseudomonas (mallei) causing diseases classd elswhr    3. Controlled type 2 diabetes mellitus without complication, without long-term current use of insulin ([facility])        Discussed with patient the aspects of wound healing including:  blood flow, wound bed optimization including moist wound healing, removal of necrosis, bioburden control, monitoring for infection,  and finally the patient as a whole including nutrition and increased protein intake and blood glucose control.  Risks, benefits, and alternatives of current treatment plan discussed in detail.  Questions and concerns addressed. Red flags to RTC or ED reviewed.  Patient (or parent) agrees  to plan.      NOTE TO PATIENT: The 21st Century Cures Act makes clinical notes like these available to patients in the interest of transparency. Clinical notes are medical documents used by physicians and care providers to communicate with each other. These documents include medical language and terminology, abbreviations, and treatment information that may sound technical and at times possibly unfamiliar. In addition, at times, the verbiage may appear blunt or direct. These documents are one tool providers use to communicate relevant information and clinical opinions of the care providers in a way that allows common understanding of the clinical context.   Patient is new to clinic, pcp is tyron.  I spent   40   minutes with the patient. This time included:    preparing to see the patient (eg, review notes and recent diagnostics),  seeing the patient, obtaining and/or reviewing separately obtained history, performing a medically appropriate examination and/or evaluation, counseling and educating the patient, documenting in the record   DISCHARGE:      Patient Instructions   Please return:1 week    Purpose home health care Monday and Wednesday-see below orders      Patient discharge and wound care instructions  Alisha Wilde  5/16/2025     Home health care:  cleanse area with mild soap and water, rinse wound with hypochlorus wound cleanser (anasept, vashe etc), then place a hypochlorus moist gauze onto the wound for 5-10minutes as you prepare your vac supplies, dab dry with gauze and apply your dressings as directed.      Negative Pressure Wound Therapy:    Remove vac sponges and drape, noting and documenting the number of sponges removed.  Moisten gauze with Dakins or other hypochlorous cleanser and place into wound bed for 5-10 minutes while supplies are prepped.  Apply skin prep to periwound and border with drape. Always label dressing with number of sponges used in the wound.   Bridge the vac to a non skin fold/non  bony prominence area  Apply Negative Pressure Wound Therapy with setting of 125 mmHg continuous.     REMOVE YOUR VAC DRESSING AND APPLY A NORMAL SALINE MOISTENED GAUZE, DRY GAUZE AND TAPE IF:  For any reason the VAC cannot be applied.  Your VAC alarms and you are unable to trouble shoot to correct the issue and the VAC has been off for 2 or more hours.  If you experience these or any other problems with the VAC, call your Home Health Nurse, the Wound Care Center or the VAC company for assistance.    PLEASE BRING A VAC DRESSING CHANGE KIT AND CANISTER KIT TO YOUR WOUND CARE VISITS SO THAT WE CAN CHANGE YOUR VAC DURING YOUR APPOINTMENT.    Nutrition and blood sugar control:  Focus on the following:  Protein: Meats, beans, eggs, milk and yogurt particularly Greek yogurt), tofu, soy nuts, soy protein products (Follow the protein handout in your welcome folder)  Vitamin C: Citrus fruits and juices, strawberries, tomatoes, tomato juice, peppers, baked potatoes, spinach, broccoli, cauliflower, [place] sprouts, cabbage  Vitamin A: Dark green, leafy vegetables, orange or yellow vegetables, cantaloupe, fortified dairy products, liver, fortified cereals  Zinc: Fortified cereals, red meats, seafood  Consider supplementing with Rafael by [employer]. It can be purchased on amazon, [employer] website, or local pharmacy may be able to order it for you.  (These are essential branch chain amino acids that help with tissue building and wound healing).   When your blood sugar is consistently elevated greater than 180 your body can't heal or fight infection.     Concerns:  Signs of infection may include the following:  Increase in redness  Red \"streaks\" from wound  Increase in swelling  Fever  Unusual odor  Change in the amount of wound drainage     Should you experience any significant changes in your wound(s) or have any questions regarding your home care instructions please contact the [facility] @ [phone] If  after regular business hours, please call your family doctor or local emergency room. The treatment plan has been discussed at length between you and your provider. Follow all instructions carefully, it is very important. If you do not follow all instructions you are at risk of your wound not healing, infection, possible loss of limb and even loss of life.      Danika Hernandez FNP-C, CWCN-AP, CFCN, CSWS, WCC, DWC  5/16/2025            [1]   Current Outpatient Medications:     metroNIDAZOLE 500 MG Oral Tab, Take 1 tablet (500 mg total) by mouth 2 (two) times daily for 7 days., Disp: 14 tablet, Rfl: 0    furosemide 40 MG Oral Tab, Take 1 tablet (40 mg total) by mouth daily., Disp: , Rfl:     clopidogrel 75 MG Oral Tab, Take 1 tablet (75 mg total) by mouth daily., Disp: , Rfl:     thiamine 100 MG Oral Tab, TAKE 1 TABLET DAILY WITH FEEDING TUBE, Disp: 90 tablet, Rfl: 1    cloNIDine 0.1 MG Oral Tab, Take 0.5 tablets (0.05 mg total) by mouth daily., Disp: , Rfl:     hydroCHLOROthiazide 12.5 MG Oral Tab, Take 1 tablet (12.5 mg total) by mouth daily., Disp: 30 tablet, Rfl: 2    hydrALAZINE 100 MG Oral Tab, Take 1 tablet (100 mg total) by mouth in the morning, at noon, and at bedtime., Disp: 90 tablet, Rfl: 2    acetaminophen 500 MG Oral Tab, Take 2 tablets (1,000 mg total) by mouth every 6 (six) hours as needed for Pain., Disp: , Rfl:     ezetimibe 10 MG Oral Tab, Take 1 tablet (10 mg total) by mouth in the morning., Disp: , Rfl:     bisacodyl 5 MG Oral Tab EC, Take 1 tablet (5 mg total) by mouth daily as needed., Disp: 30 tablet, Rfl: 0    docusate sodium (COLACE) 100 MG Oral Cap, Take 1 capsule (100 mg total) by mouth 2 (two) times daily as needed for constipation., Disp: 180 capsule, Rfl: 1    azelastine 0.1 % Nasal Solution, 1 spray by Nasal route 2 (two) times daily., Disp: 3 each, Rfl: 1    amLODIPine 10 MG Oral Tab, Take 1 tablet (10 mg total) by mouth daily., Disp: 90 tablet, Rfl: 3    levoFLOXacin 500 MG Oral Tab,  Take 1 tablet (500 mg total) by mouth daily for 10 days. (Patient not taking: Reported on 2025), Disp: 10 tablet, Rfl: 0    levocetirizine 5 MG Oral Tab, Take 1 tablet (5 mg total) by mouth every evening., Disp: 30 tablet, Rfl: 1    pantoprazole 40 MG Oral Tab EC, Take 1 tablet (40 mg total) by mouth every morning before breakfast., Disp: 90 tablet, Rfl: 3    Ferrous Gluconate 324 (37.5 Fe) MG Oral Tab, Take 1 tablet (324 mg total) by mouth daily. (Patient not taking: Reported on 2025), Disp: 90 tablet, Rfl: 1    mirtazapine 7.5 MG Oral Tab, 1 tablet (7.5 mg total) by Per G Tube route nightly. (Patient taking differently: 1 tablet (7.5 mg total) by Per G Tube route as needed.), Disp: 30 tablet, Rfl: 0  [2]   Allergies  Allergen Reactions    Atorvastatin MYALGIA    Pravastatin MYALGIA    Rosuvastatin MYALGIA    Seasonal Runny nose   [3]   Past Medical History:   Arthritis    Chronic kidney disease (CKD)    Esophageal reflux    High blood pressure    High cholesterol    Hyperlipidemia    HYPERTENSION    Hypertension    Unspecified essential hypertension   [4]   Past Surgical History:  Procedure Laterality Date    Anesth,open heart surgery  2024    Colonoscopy N/A 2018    Procedure: COLONOSCOPY;  Surgeon: Magdy Webber MD;  Location: [facility] ENDOSCOPY    Colonoscopy  2018    Endometrial ablation      child passed away for 5 mins          1    Other surgical history  2011    cysto-Dr. Lacey -- pt denies   [5]   Social History  Socioeconomic History    Marital status: Single   Tobacco Use    Smoking status: Former     Current packs/day: 0.00     Average packs/day: 1 pack/day for 13.0 years (13.0 ttl pk-yrs)     Types: Cigarettes     Start date:      Quit date:      Years since quittin.3    Smokeless tobacco: Former   Vaping Use    Vaping status: Never Used   Substance and Sexual Activity    Alcohol use: Not Currently     Alcohol/week: 1.0 - 2.0 standard drink of  alcohol     Types: 1 - 2 Cans of beer per week     Comment: every day  NOT DRINKING FOR LAS 4 MONTHS    Drug use: No     Social Drivers of Health     Food Insecurity: No Food Insecurity (5/5/2025)    NCSS - Food Insecurity     Worried About Running Out of Food in the Last Year: No     Ran Out of Food in the Last Year: No   Transportation Needs: No Transportation Needs (5/5/2025)    NCSS - Transportation     Lack of Transportation: No   Housing Stability: Not At Risk (5/5/2025)    NCSS - Housing/Utilities     Has Housing: Yes     Worried About Losing Housing: No     Unable to Get Utilities: No

## 2025-05-16 ENCOUNTER — APPOINTMENT (OUTPATIENT)
Dept: CARDIAC REHAB | Facility: HOSPITAL | Age: 60
End: 2025-05-16
Attending: INTERNAL MEDICINE
Payer: COMMERCIAL

## 2025-05-16 ENCOUNTER — OFFICE VISIT (OUTPATIENT)
Dept: WOUND CARE | Facility: HOSPITAL | Age: 60
End: 2025-05-16
Attending: NURSE PRACTITIONER
Payer: COMMERCIAL

## 2025-05-16 VITALS
BODY MASS INDEX: 34.99 KG/M2 | WEIGHT: 210 LBS | TEMPERATURE: 98 F | RESPIRATION RATE: 16 BRPM | HEART RATE: 86 BPM | SYSTOLIC BLOOD PRESSURE: 113 MMHG | HEIGHT: 65 IN | DIASTOLIC BLOOD PRESSURE: 59 MMHG

## 2025-05-16 DIAGNOSIS — T81.31XS DISRUPTION OF EXTERNAL SURGICAL WOUND, SEQUELA: ICD-10-CM

## 2025-05-16 DIAGNOSIS — E11.9 CONTROLLED TYPE 2 DIABETES MELLITUS WITHOUT COMPLICATION, WITHOUT LONG-TERM CURRENT USE OF INSULIN (HCC): Primary | ICD-10-CM

## 2025-05-16 DIAGNOSIS — B96.5 PSEUDOMONAS (MALLEI) CAUSING DISEASES CLASSD ELSWHR: ICD-10-CM

## 2025-05-16 PROCEDURE — 99215 OFFICE O/P EST HI 40 MIN: CPT | Performed by: NURSE PRACTITIONER

## 2025-05-16 NOTE — PATIENT INSTRUCTIONS
Please return:1 week    Purpose home health care Monday and Wednesday-see below orders      Patient discharge and wound care instructions  Alisha Wilde  5/16/2025     Home health care:  cleanse area with mild soap and water, rinse wound with hypochlorus wound cleanser (anasept, vashe etc), then place a hypochlorus moist gauze onto the wound for 5-10minutes as you prepare your vac supplies, dab dry with gauze and apply your dressings as directed.      Negative Pressure Wound Therapy:    Remove vac sponges and drape, noting and documenting the number of sponges removed.  Moisten gauze with Dakins or other hypochlorous cleanser and place into wound bed for 5-10 minutes while supplies are prepped.  Apply skin prep to periwound and border with drape. Always label dressing with number of sponges used in the wound.   Bridge the vac to a non skin fold/non bony prominence area  Apply Negative Pressure Wound Therapy with setting of 125 mmHg continuous.     REMOVE YOUR VAC DRESSING AND APPLY A NORMAL SALINE MOISTENED GAUZE, DRY GAUZE AND TAPE IF:  For any reason the VAC cannot be applied.  Your VAC alarms and you are unable to trouble shoot to correct the issue and the VAC has been off for 2 or more hours.  If you experience these or any other problems with the VAC, call your Home Health Nurse, the Wound Care Center or the VAC company for assistance.    PLEASE BRING A VAC DRESSING CHANGE KIT AND CANISTER KIT TO YOUR WOUND CARE VISITS SO THAT WE CAN CHANGE YOUR VAC DURING YOUR APPOINTMENT.    Nutrition and blood sugar control:  Focus on the following:  Protein: Meats, beans, eggs, milk and yogurt particularly Greek yogurt), tofu, soy nuts, soy protein products (Follow the protein handout in your welcome folder)  Vitamin C: Citrus fruits and juices, strawberries, tomatoes, tomato juice, peppers, baked potatoes, spinach, broccoli, cauliflower, [place] sprouts, cabbage  Vitamin A: Dark green, leafy vegetables, orange or yellow  vegetables, cantaloupe, fortified dairy products, liver, fortified cereals  Zinc: Fortified cereals, red meats, seafood  Consider supplementing with Rafael by [employer]. It can be purchased on amazon, [employer] website, or local pharmacy may be able to order it for you.  (These are essential branch chain amino acids that help with tissue building and wound healing).   When your blood sugar is consistently elevated greater than 180 your body can't heal or fight infection.     Concerns:  Signs of infection may include the following:  Increase in redness  Red \"streaks\" from wound  Increase in swelling  Fever  Unusual odor  Change in the amount of wound drainage     Should you experience any significant changes in your wound(s) or have any questions regarding your home care instructions please contact the [facility] @ [phone] If after regular business hours, please call your family doctor or local emergency room. The treatment plan has been discussed at length between you and your provider. Follow all instructions carefully, it is very important. If you do not follow all instructions you are at risk of your wound not healing, infection, possible loss of limb and even loss of life.

## 2025-05-16 NOTE — PROGRESS NOTES
.Weekly Wound Education Note    Teaching Provided To: Patient  Training Topics: Discharge instructions, Negative presssure therapy  Training Method: Explain/Verbal, Written  Training Response: Patient responds and understands, Reinforcement needed            Sutures removed.  VASHE soak.  NPWT @ 125mmHg to continue.  Patient encouraged to drink Rafael.

## 2025-05-18 ENCOUNTER — HOSPITAL ENCOUNTER (OUTPATIENT)
Dept: CT IMAGING | Age: 60
Discharge: HOME | End: 2025-05-18
Attending: INTERNAL MEDICINE

## 2025-05-18 DIAGNOSIS — Z13.6 SCREENING FOR HEART DISEASE: ICD-10-CM

## 2025-05-19 ENCOUNTER — APPOINTMENT (OUTPATIENT)
Dept: CARDIAC REHAB | Facility: HOSPITAL | Age: 60
End: 2025-05-19
Attending: INTERNAL MEDICINE
Payer: COMMERCIAL

## 2025-05-21 ENCOUNTER — APPOINTMENT (OUTPATIENT)
Dept: CARDIAC REHAB | Facility: HOSPITAL | Age: 60
End: 2025-05-21
Attending: INTERNAL MEDICINE
Payer: COMMERCIAL

## 2025-05-22 NOTE — TELEPHONE ENCOUNTER
Please Review. Protocol Failed; No Protocol     Requested Prescriptions   Pending Prescriptions Disp Refills    HYDROCHLOROTHIAZIDE 12.5 MG Oral Tab [Pharmacy Med Name: HYDROCHLOROTHIAZIDE 12.5 MG TB] 30 tablet 2     Sig: TAKE 1 TABLET BY MOUTH EVERY DAY       Hypertension Medications Protocol Failed - 5/22/2025 12:36 PM        Failed - EGFRCR or GFRAA > 50     GFR Evaluation  EGFRCR: 38 , resulted on 5/6/2025

## 2025-05-23 ENCOUNTER — OFFICE VISIT (OUTPATIENT)
Dept: WOUND CARE | Facility: HOSPITAL | Age: 60
End: 2025-05-23
Attending: NURSE PRACTITIONER
Payer: COMMERCIAL

## 2025-05-23 ENCOUNTER — APPOINTMENT (OUTPATIENT)
Dept: CARDIAC REHAB | Facility: HOSPITAL | Age: 60
End: 2025-05-23
Attending: INTERNAL MEDICINE
Payer: COMMERCIAL

## 2025-05-23 VITALS
SYSTOLIC BLOOD PRESSURE: 121 MMHG | HEART RATE: 95 BPM | DIASTOLIC BLOOD PRESSURE: 72 MMHG | RESPIRATION RATE: 16 BRPM | TEMPERATURE: 98 F

## 2025-05-23 DIAGNOSIS — T81.31XS DISRUPTION OF EXTERNAL SURGICAL WOUND, SEQUELA: Primary | ICD-10-CM

## 2025-05-23 DIAGNOSIS — E11.9 CONTROLLED TYPE 2 DIABETES MELLITUS WITHOUT COMPLICATION, WITHOUT LONG-TERM CURRENT USE OF INSULIN (HCC): ICD-10-CM

## 2025-05-23 PROCEDURE — 99213 OFFICE O/P EST LOW 20 MIN: CPT

## 2025-05-23 RX ORDER — HYDROCHLOROTHIAZIDE 12.5 MG/1
12.5 TABLET ORAL DAILY
Qty: 90 TABLET | Refills: 0 | Status: SHIPPED | OUTPATIENT
Start: 2025-05-23

## 2025-05-23 NOTE — PROGRESS NOTES
.Weekly Wound Education Note    Teaching Provided To: Patient  Training Topics: Dressing, Cleasing and general instructions, Discharge instructions  Training Method: Explain/Verbal, Written  Training Response: Patient responds and understands        Notes: Wound improving. Per provider, ok to d/c NPWT, goal met. Pt is to send machine back. Dressing changed to go, silver alginate strip, and bordered gauze.

## 2025-05-23 NOTE — PROGRESS NOTES
CHIEF COMPLAINT:     Chief Complaint   Patient presents with    Wound Care     Patients is here for a follow up. Patients stated no issue with the wound vac      HPI:   Information obtained from patient and chart  5-16-25 INITIAL:  60 YO female with htn, obesity, hypercholesterolemia, and adjustment disorder under went a percutaneous access for complex endovascular repair of an aortic dissection .  She developed a right groin infection.  She was admitted from 5-5 to 5-10.  On 5-5-25 she underwent a superficial abscess I&D, followed by debridement of right groin with placement of integra on 5-7-25.  Operative cultures grew pseudomonas and infectious disease recommended levaquin on discharge.  She is presenting today with wound vac in place.  She states she has a couple days of abx left.  There is not any s/s of infection noted. Patient is anxious. She is ambulatory.    5-23-25 patient returns. She followed up with primary and expressed depression, was referred to [facility].  She states that they called her this am and they will be calling back tomorrow.  [facility] has been changing vac. Wound is looking very good. Vac dc'd, will transition to local dressings of go and silver alginate. Suspect we may be able to transition to triad paste next week. Patient has an appointment with dr. Holder next week. Patient has no c/o pain or s/s of infection.    MEDICATIONS:   Medications - Current[1]  ALLERGIES:   Allergies[2]   REVIEW OF SYSTEMS:   This information was obtained from the patient/family and chart.    See HPI for pertinent positives, otherwise 10 pt ROS negative.    HISTORY:   Past medical, surgical, family and social history updated where appropriate.    PHYSICAL EXAM:     Vitals:    05/23/25 1514   BP: 121/72   Pulse: 95   Resp: 16   Temp: 98 °F (36.7 °C)        Estimated body mass index is 34.95 kg/m² as calculated from the following:    Height as of 5/19/25: 65\".    Weight as of 5/19/25: 210 lb (95.3 kg).   POC  Glucose   Date Value Ref Range Status   05/07/2025 107 (H) 70 - 99 mg/dL Final   04/09/2025 103 (H) 70 - 99 mg/dL Final   04/01/2025 127 (H) 70 - 99 mg/dL Final       Vital signs reviewed.Appears stated age, well groomed.    Constitutional:  Bp wnl for patient. Pulse Regular and wnl for patient. Respirations easy and unlabored. Temperature wnl. Obese.  Appearance neat and clean. Appears in no acute distress. Well nourished and well developed.    Musculoskeletal:  Gait and station stable   Integumentary:  refer to wound characteristics and images   Psychiatric:  Judgment and insight intact. Alert and oriented times 3. No evidence of depression, anxiety, or agitation. Calm, cooperative, and communicative.   DIAGNOSTICS:     Lab Results   Component Value Date    BUN 12 05/06/2025    CREATSERUM 1.53 (H) 05/06/2025    ALB 4.1 04/14/2025    TP 7.3 04/14/2025    A1C 5.9 (H) 04/05/2025       WOUND ASSESSMENT:     Negative Pressure Wound Therapy Leg Anterior;Proximal;Right;Upper (Active)   Placement Date/Time: 05/07/25 1424   Inserted by: DR. ESQUIVEL  Location: Leg  Wound Location Orientation: Anterior;Proximal;Right;Upper      Assessments 5/7/2025  2:39 PM 5/16/2025  9:52 AM   Wound photographed/measured -- Yes   Machine Status (On) -- Yes   Site Assessment Clean;Dry;Intact --   Pamela-wound Assessment -- Clean;Moist   Unit Type VAC Ultra KCI/3M   Dressing Type Black foam Black foam   Number of Foam Pieces Used -- 1   Cycle -- Continuous   Target Pressure (mmHg) 125 125   Drainage Description -- Serosanguineous   Dressing Status Clean;Dry;Intact --   Canister Changed -- Yes       No associated orders.       Wound 05/16/25 #1 Groin Right (Active)   Date First Assessed/Time First Assessed: 05/16/25 0842    Wound Number (Wound Clinic Only): #1  Primary Wound Type: Old surgical  Location: Groin  Wound Location Orientation: Right      Assessments 5/16/2025  8:50 AM 5/23/2025  3:18 PM   Wound Image       Drainage Amount Large Scant    Drainage Description Sanguineous Serosanguineous   Treatments Wound Vac - Neg Pressure --   Wound Vac Brand KCI --   Wound Length (cm) 0.4 cm (slight stretch) 0.3 cm (slight pull)   Wound Width (cm) 3.2 cm 1.4 cm   Wound Surface Area (cm^2) 1.01 cm^2 0.33 cm^2   Wound Depth (cm) 0.9 cm 0.5 cm   Wound Volume (cm^3) 0.603 cm^3 0.11 cm^3   Wound Healing % -- 82   Margins Well-defined edges Well-defined edges   Non-staged Wound Description Full thickness Full thickness   Pamela-wound Assessment Edema;Induration Induration;Clean   Wound Granulation Tissue Pink;Spongy;Red Firm;Red   Wound Bed Granulation (%) 60 % 100 %   Wound Bed Slough (%) 10 % --   Wound Odor -- None   Shape 30% integrated skin sub, 7 clear sutures noted to periwound --       No associated orders.     Negative Pressure Wound Therapy Leg Anterior;Proximal;Right;Upper (Active)   05/07/25 1424 Leg   Placed by External Staff?:    Inserted by: DR. ESQUIVEL   Wound Type:    Wound Location Orientation: Anterior;Proximal;Right;Upper   Removal Reason:    Wound photographed/measured Yes 05/16/25 0952   Machine Status (On) Yes 05/16/25 0952   Pamela-wound Assessment Clean;Moist 05/16/25 0952   Unit Type KCI/3M 05/16/25 0952   Dressing Type Black foam 05/16/25 0952   Number of Foam Pieces Used 1 05/16/25 0952   Cycle Continuous 05/16/25 0952   Target Pressure (mmHg) 125 05/16/25 0952   Drainage Description Serosanguineous 05/16/25 0952   Canister Changed Yes 05/16/25 0952     ASSESSMENT AND PLAN:      1. Disruption of external surgical wound, sequela    2. Controlled type 2 diabetes mellitus without complication, without long-term current use of insulin (HCC)        Risks, benefits, and alternatives of current treatment plan discussed in detail.  Questions and concerns addressed. Red flags to RTC or ED reviewed.  Patient (or parent) agrees to plan.      NOTE TO PATIENT: The 21st Century Cures Act makes clinical notes like these available to patients in the interest of  transparency. Clinical notes are medical documents used by physicians and care providers to communicate with each other. These documents include medical language and terminology, abbreviations, and treatment information that may sound technical and at times possibly unfamiliar. In addition, at times, the verbiage may appear blunt or direct. These documents are one tool providers use to communicate relevant information and clinical opinions of the care providers in a way that allows common understanding of the clinical context.   I spent   29  minutes with the patient. This time included:    preparing to see the patient (eg, review notes and recent diagnostics),  seeing the patient, obtaining and/or reviewing separately obtained history, performing a medically appropriate examination and/or evaluation, counseling and educating the patient, documenting in the record, update to vascular  DISCHARGE:      Patient Instructions   Please return:1 week-Friday at 9 am      Purpose home health care Monday and Wednesday-see below orders      Patient discharge and wound care instructions  Alisha Wilde  5/23/2025       Discontinue wound vac-goal met        Changing your dressing:            Monday, Wednesday, Friday and as needed  Wash your hands with soap and water.  Ensure that the old dressing is removed completely. Place it in a plastic bag and throw it in the trash.  Cleanse the wound with hypochlorous wound cleanser (ie. Anasept, vashe, pure and clean).  It's ok to “scrub” your wound with the gauze, small amount of bleeding with cleansing is normal and ok.  Apply the following dressings:  go>silver alginate>border gauze       Nutrition and blood sugar control:  Focus on the following:  Protein: Meats, beans, eggs, milk and yogurt particularly Greek yogurt), tofu, soy nuts, soy protein products (Follow the protein handout in your welcome folder)  Vitamin C: Citrus fruits and juices, strawberries, tomatoes, tomato juice,  peppers, baked potatoes, spinach, broccoli, cauliflower, [place] sprouts, cabbage  Vitamin A: Dark green, leafy vegetables, orange or yellow vegetables, cantaloupe, fortified dairy products, liver, fortified cereals  Zinc: Fortified cereals, red meats, seafood  Consider supplementing with Rafael by [employer]. It can be purchased on amazon, [employer] website, or local pharmacy may be able to order it for you.  (These are essential branch chain amino acids that help with tissue building and wound healing).   When your blood sugar is consistently elevated greater than 180 your body can't heal or fight infection.     Concerns:  Signs of infection may include the following:  Increase in redness  Red \"streaks\" from wound  Increase in swelling  Fever  Unusual odor  Change in the amount of wound drainage     Should you experience any significant changes in your wound(s) or have any questions regarding your home care instructions please contact the [facility] @ [phone] If after regular business hours, please call your family doctor or local emergency room. The treatment plan has been discussed at length between you and your provider. Follow all instructions carefully, it is very important. If you do not follow all instructions you are at risk of your wound not healing, infection, possible loss of limb and even loss of life.    Danika Hernandez FNP-C, CWCN-AP, CFCN, CSWS, WCC, DWC  5/23/2025            [1]   Current Outpatient Medications:     carvedilol 25 MG Oral Tab, , Disp: , Rfl:     furosemide 40 MG Oral Tab, Take 1 tablet (40 mg total) by mouth daily., Disp: , Rfl:     clopidogrel 75 MG Oral Tab, Take 1 tablet (75 mg total) by mouth daily., Disp: , Rfl:     thiamine 100 MG Oral Tab, TAKE 1 TABLET DAILY WITH FEEDING TUBE, Disp: 90 tablet, Rfl: 1    cloNIDine 0.1 MG Oral Tab, Take 0.5 tablets (0.05 mg total) by mouth daily., Disp: , Rfl:     levocetirizine 5 MG Oral Tab, Take 1 tablet (5 mg total) by  mouth every evening., Disp: 30 tablet, Rfl: 1    hydroCHLOROthiazide 12.5 MG Oral Tab, Take 1 tablet (12.5 mg total) by mouth daily., Disp: 30 tablet, Rfl: 2    hydrALAZINE 100 MG Oral Tab, Take 1 tablet (100 mg total) by mouth in the morning, at noon, and at bedtime., Disp: 90 tablet, Rfl: 2    acetaminophen 500 MG Oral Tab, Take 2 tablets (1,000 mg total) by mouth every 6 (six) hours as needed for Pain., Disp: , Rfl:     ezetimibe 10 MG Oral Tab, Take 1 tablet (10 mg total) by mouth in the morning., Disp: , Rfl:     pantoprazole 40 MG Oral Tab EC, Take 1 tablet (40 mg total) by mouth every morning before breakfast., Disp: 90 tablet, Rfl: 3    bisacodyl 5 MG Oral Tab EC, Take 1 tablet (5 mg total) by mouth daily as needed., Disp: 30 tablet, Rfl: 0    docusate sodium (COLACE) 100 MG Oral Cap, Take 1 capsule (100 mg total) by mouth 2 (two) times daily as needed for constipation., Disp: 180 capsule, Rfl: 1    Ferrous Gluconate 324 (37.5 Fe) MG Oral Tab, Take 1 tablet (324 mg total) by mouth daily., Disp: 90 tablet, Rfl: 1    azelastine 0.1 % Nasal Solution, 1 spray by Nasal route 2 (two) times daily., Disp: 3 each, Rfl: 1    mirtazapine 7.5 MG Oral Tab, 1 tablet (7.5 mg total) by Per G Tube route nightly. (Patient taking differently: 1 tablet (7.5 mg total) by Per G Tube route as needed.), Disp: 30 tablet, Rfl: 0    amLODIPine 10 MG Oral Tab, Take 1 tablet (10 mg total) by mouth daily., Disp: 90 tablet, Rfl: 3  [2]   Allergies  Allergen Reactions    Atorvastatin MYALGIA    Pravastatin MYALGIA    Rosuvastatin MYALGIA    Seasonal Runny nose

## 2025-05-23 NOTE — PATIENT INSTRUCTIONS
Please return:1 week-Friday at 9 am      Purpose home health care Monday and Wednesday-see below orders      Patient discharge and wound care instructions  Alisha Wilde  5/23/2025       Discontinue wound vac-goal met        Changing your dressing:            Monday, Wednesday, Friday and as needed  Wash your hands with soap and water.  Ensure that the old dressing is removed completely. Place it in a plastic bag and throw it in the trash.  Cleanse the wound with hypochlorous wound cleanser (ie. Anasept, vashe, pure and clean).  It's ok to “scrub” your wound with the gauze, small amount of bleeding with cleansing is normal and ok.  Apply the following dressings:  go>silver alginate>border gauze       Nutrition and blood sugar control:  Focus on the following:  Protein: Meats, beans, eggs, milk and yogurt particularly Greek yogurt), tofu, soy nuts, soy protein products (Follow the protein handout in your welcome folder)  Vitamin C: Citrus fruits and juices, strawberries, tomatoes, tomato juice, peppers, baked potatoes, spinach, broccoli, cauliflower, [place] sprouts, cabbage  Vitamin A: Dark green, leafy vegetables, orange or yellow vegetables, cantaloupe, fortified dairy products, liver, fortified cereals  Zinc: Fortified cereals, red meats, seafood  Consider supplementing with Rafael by [employer] It can be purchased on amazon, [employer] website, or local pharmacy may be able to order it for you.  (These are essential branch chain amino acids that help with tissue building and wound healing).   When your blood sugar is consistently elevated greater than 180 your body can't heal or fight infection.     Concerns:  Signs of infection may include the following:  Increase in redness  Red \"streaks\" from wound  Increase in swelling  Fever  Unusual odor  Change in the amount of wound drainage     Should you experience any significant changes in your wound(s) or have any questions regarding your home care instructions  please contact the wound center [facility] @ [phone] If after regular business hours, please call your family doctor or local emergency room. The treatment plan has been discussed at length between you and your provider. Follow all instructions carefully, it is very important. If you do not follow all instructions you are at risk of your wound not healing, infection, possible loss of limb and even loss of life.

## 2025-05-26 ENCOUNTER — HOSPITAL ENCOUNTER (EMERGENCY)
Facility: HOSPITAL | Age: 60
Discharge: HOME | End: 2025-05-27
Attending: EMERGENCY MEDICINE
Payer: COMMERCIAL

## 2025-05-26 ENCOUNTER — PATIENT MESSAGE (OUTPATIENT)
Dept: FAMILY MEDICINE CLINIC | Facility: CLINIC | Age: 60
End: 2025-05-26

## 2025-05-26 ENCOUNTER — APPOINTMENT (OUTPATIENT)
Dept: GENERAL RADIOLOGY | Facility: HOSPITAL | Age: 60
End: 2025-05-26
Attending: EMERGENCY MEDICINE
Payer: COMMERCIAL

## 2025-05-26 DIAGNOSIS — D64.9 ANEMIA, UNSPECIFIED TYPE: ICD-10-CM

## 2025-05-26 DIAGNOSIS — E87.1 HYPONATREMIA: ICD-10-CM

## 2025-05-26 DIAGNOSIS — R07.9 CHEST PAIN OF UNCERTAIN ETIOLOGY: Primary | ICD-10-CM

## 2025-05-26 DIAGNOSIS — N18.9 CHRONIC KIDNEY DISEASE, UNSPECIFIED CKD STAGE: ICD-10-CM

## 2025-05-26 LAB
ALBUMIN SERPL-MCNC: 4.5 G/DL (ref 3.2–4.8)
ALBUMIN/GLOB SERPL: 1.5 {RATIO} (ref 1–2)
ALP LIVER SERPL-CCNC: 60 U/L (ref 50–130)
ALT SERPL-CCNC: <7 U/L (ref 10–49)
ANION GAP SERPL CALC-SCNC: 12 MMOL/L (ref 0–18)
APTT PPP: 28.7 SECONDS (ref 23–36)
AST SERPL-CCNC: 11 U/L (ref ?–34)
BASOPHILS # BLD AUTO: 0.02 X10(3) UL (ref 0–0.2)
BASOPHILS NFR BLD AUTO: 0.4 %
BILIRUB SERPL-MCNC: 0.3 MG/DL (ref 0.2–1.1)
BUN BLD-MCNC: 15 MG/DL (ref 9–23)
CALCIUM BLD-MCNC: 10.1 MG/DL (ref 8.7–10.6)
CHLORIDE SERPL-SCNC: 105 MMOL/L (ref 98–112)
CO2 SERPL-SCNC: 24 MMOL/L (ref 21–32)
CREAT BLD-MCNC: 1.54 MG/DL (ref 0.55–1.02)
EGFRCR SERPLBLD CKD-EPI 2021: 38 ML/MIN/1.73M2 (ref 60–?)
EOSINOPHIL # BLD AUTO: 0.07 X10(3) UL (ref 0–0.7)
EOSINOPHIL NFR BLD AUTO: 1.2 %
ERYTHROCYTE [DISTWIDTH] IN BLOOD BY AUTOMATED COUNT: 18.3 %
GLOBULIN PLAS-MCNC: 3.1 G/DL (ref 2–3.5)
GLUCOSE BLD-MCNC: 102 MG/DL (ref 70–99)
HCT VFR BLD AUTO: 30.2 % (ref 35–48)
HGB BLD-MCNC: 9.6 G/DL (ref 12–16)
IMM GRANULOCYTES # BLD AUTO: 0.01 X10(3) UL (ref 0–1)
IMM GRANULOCYTES NFR BLD: 0.2 %
INR BLD: 1.06 (ref 0.8–1.2)
LYMPHOCYTES # BLD AUTO: 1.65 X10(3) UL (ref 1–4)
LYMPHOCYTES NFR BLD AUTO: 28.9 %
MCH RBC QN AUTO: 24.8 PG (ref 26–34)
MCHC RBC AUTO-ENTMCNC: 31.8 G/DL (ref 31–37)
MCV RBC AUTO: 78 FL (ref 80–100)
MONOCYTES # BLD AUTO: 0.53 X10(3) UL (ref 0.1–1)
MONOCYTES NFR BLD AUTO: 9.3 %
NEUTROPHILS # BLD AUTO: 3.42 X10 (3) UL (ref 1.5–7.7)
NEUTROPHILS # BLD AUTO: 3.42 X10(3) UL (ref 1.5–7.7)
NEUTROPHILS NFR BLD AUTO: 60 %
OSMOLALITY SERPL CALC.SUM OF ELEC: 293 MOSM/KG (ref 275–295)
PLATELET # BLD AUTO: 239 10(3)UL (ref 150–450)
POTASSIUM SERPL-SCNC: 3 MMOL/L (ref 3.5–5.1)
PROT SERPL-MCNC: 7.6 G/DL (ref 5.7–8.2)
PROTHROMBIN TIME: 13.9 SECONDS (ref 11.6–14.8)
RBC # BLD AUTO: 3.87 X10(6)UL (ref 3.8–5.3)
SODIUM SERPL-SCNC: 141 MMOL/L (ref 136–145)
TROPONIN I SERPL HS-MCNC: 14 NG/L (ref ?–34)
WBC # BLD AUTO: 5.7 X10(3) UL (ref 4–11)

## 2025-05-26 PROCEDURE — 85610 PROTHROMBIN TIME: CPT | Performed by: EMERGENCY MEDICINE

## 2025-05-26 PROCEDURE — 93010 ELECTROCARDIOGRAM REPORT: CPT

## 2025-05-26 PROCEDURE — 84484 ASSAY OF TROPONIN QUANT: CPT | Performed by: EMERGENCY MEDICINE

## 2025-05-26 PROCEDURE — 80053 COMPREHEN METABOLIC PANEL: CPT | Performed by: EMERGENCY MEDICINE

## 2025-05-26 PROCEDURE — 85025 COMPLETE CBC W/AUTO DIFF WBC: CPT | Performed by: EMERGENCY MEDICINE

## 2025-05-26 PROCEDURE — 85730 THROMBOPLASTIN TIME PARTIAL: CPT | Performed by: EMERGENCY MEDICINE

## 2025-05-26 PROCEDURE — 36415 COLL VENOUS BLD VENIPUNCTURE: CPT

## 2025-05-26 PROCEDURE — 93005 ELECTROCARDIOGRAM TRACING: CPT

## 2025-05-26 PROCEDURE — 71045 X-RAY EXAM CHEST 1 VIEW: CPT | Performed by: EMERGENCY MEDICINE

## 2025-05-26 PROCEDURE — 99284 EMERGENCY DEPT VISIT MOD MDM: CPT

## 2025-05-26 PROCEDURE — 99285 EMERGENCY DEPT VISIT HI MDM: CPT

## 2025-05-26 RX ORDER — POTASSIUM CHLORIDE 1500 MG/1
40 TABLET, EXTENDED RELEASE ORAL ONCE
Status: COMPLETED | OUTPATIENT
Start: 2025-05-26 | End: 2025-05-26

## 2025-05-27 VITALS
HEIGHT: 65 IN | HEART RATE: 83 BPM | SYSTOLIC BLOOD PRESSURE: 134 MMHG | WEIGHT: 210 LBS | DIASTOLIC BLOOD PRESSURE: 75 MMHG | RESPIRATION RATE: 21 BRPM | OXYGEN SATURATION: 100 % | BODY MASS INDEX: 34.99 KG/M2 | TEMPERATURE: 98 F

## 2025-05-27 LAB
ATRIAL RATE: 90 BPM
P AXIS: 53 DEGREES
P-R INTERVAL: 188 MS
Q-T INTERVAL: 382 MS
QRS DURATION: 96 MS
QTC CALCULATION (BEZET): 467 MS
R AXIS: -4 DEGREES
T AXIS: 59 DEGREES
TROPONIN I SERPL HS-MCNC: 15 NG/L (ref ?–34)
VENTRICULAR RATE: 90 BPM

## 2025-05-27 PROCEDURE — 84484 ASSAY OF TROPONIN QUANT: CPT | Performed by: EMERGENCY MEDICINE

## 2025-05-27 NOTE — ED PROVIDER NOTES
Patient Seen in: [facility] Emergency Department        History  Chief Complaint   Patient presents with    Chest Pain     Stated Complaint: Chest tightness, had stents placed `    Subjective:   Patient 59-year-old female who presents emergency room for chest pain.  Patient recently had stent placement on 5 May.  Patient reports that she started having intermittent chest pain.  She reports it is a pressure in nature she speaking full complete sentences.  Patient denies any fevers no coughing.    The history is provided by the patient.                     Objective:     Past Medical History:    Arthritis    Chronic kidney disease (CKD)    Esophageal reflux    High blood pressure    High cholesterol    Hyperlipidemia    HYPERTENSION    Hypertension    Unspecified essential hypertension              Past Surgical History:   Procedure Laterality Date    Anesth,open heart surgery  2024    Colonoscopy N/A 2018    Procedure: COLONOSCOPY;  Surgeon: Magdy Webber MD;  Location: [facility] ENDOSCOPY    Colonoscopy  2018    Endometrial ablation      child passed away for 5 mins          1    Other surgical history  2011    cysto-Dr. Lacey -- pt denies                No pertinent social history.                              Physical Exam    ED Triage Vitals [25 2155]   /75   Pulse 80   Resp 18   Temp 98.1 °F (36.7 °C)   Temp src Oral   SpO2 98 %   O2 Device None (Room air)       Current Vitals:   Vital Signs  BP: 134/75  Pulse: 80  Resp: 18  Temp: 98.1 °F (36.7 °C)  Temp src: Oral    Oxygen Therapy  SpO2: 98 %  O2 Device: None (Room air)            Physical Exam  Vitals and nursing note reviewed.   Constitutional:       General: She is not in acute distress.     Appearance: She is well-developed and normal weight. She is not toxic-appearing.   HENT:      Head: Normocephalic and atraumatic.   Eyes:      Extraocular Movements: Extraocular movements intact.      Pupils:  Pupils are equal, round, and reactive to light.   Cardiovascular:      Rate and Rhythm: Normal rate and regular rhythm.      Heart sounds: Normal heart sounds.   Pulmonary:      Effort: Pulmonary effort is normal. No tachypnea or respiratory distress.      Breath sounds: Normal breath sounds. No wheezing.   Chest:      Chest wall: No mass or tenderness.   Abdominal:      General: Bowel sounds are normal.      Palpations: Abdomen is soft. There is no mass.      Tenderness: There is no abdominal tenderness. There is no guarding.   Musculoskeletal:      Cervical back: Neck supple.      Right lower leg: No edema.      Left lower leg: No edema.   Skin:     General: Skin is warm.      Capillary Refill: Capillary refill takes less than 2 seconds.   Neurological:      General: No focal deficit present.      Mental Status: She is alert and oriented to person, place, and time.   Psychiatric:         Mood and Affect: Mood normal.         Behavior: Behavior normal.                 ED Course  Labs Reviewed   COMP METABOLIC PANEL (14) - Abnormal; Notable for the following components:       Result Value    Glucose 102 (*)     Potassium 3.0 (*)     Creatinine 1.54 (*)     eGFR-Cr 38 (*)     ALT <7 (*)     All other components within normal limits   CBC WITH DIFFERENTIAL WITH PLATELET - Abnormal; Notable for the following components:    HGB 9.6 (*)     HCT 30.2 (*)     MCV 78.0 (*)     MCH 24.8 (*)     All other components within normal limits   TROPONIN I HIGH SENSITIVITY - Normal   PROTHROMBIN TIME (PT) - Normal   PTT, ACTIVATED - Normal   TROPONIN I HIGH SENSITIVITY - Normal     EKG    Rate, intervals and axes as noted on EKG Report.  Rate: 90  Rhythm: Sinus Rhythm  Reading: Normal sinus rhythm no ST elevation.  Occasional PACs NM interval of 188 QRS of 96 QTc of 467 with axes of 53/minus/59              XR CHEST AP PORTABLE  (CPT=71045)  Result Date: 5/26/2025  PROCEDURE:  XR CHEST AP PORTABLE  (CPT=71045)  TECHNIQUE:  AP chest  radiograph was obtained.  COMPARISON:  [facility] , XR, XR CHEST AP PORTABLE  (CPT=71045), 4/08/2025, 11:56 AM.  INDICATIONS:  Chest tightness, had stents placed 05/05  PATIENT STATED HISTORY: (As transcribed by Technologist)  Patient offered no additional history at this time.    FINDINGS:             CONCLUSION:  Stable cardiac and mediastinal contours with aortic stent graft again noted.  No pulmonary edema or focal airspace consolidation.  Subsegmental atelectasis or scar of the peripheral left lung base.  No significant pleural effusion or appreciable pneumothorax.   LOCATION:  [facility]      Dictated by (CST): Tristen Resendiz MD on 5/26/2025 at 11:39 PM     Finalized by (CST): Tristen Resendiz MD on 5/26/2025 at 11:40 PM                         MDM     Social -negative tobacco, quit in November etoh, negative drugs  Family History-family history of end-stage renal disease hypertension and pacemaker in her mother  Past Medical History-hypertension GERD high cholesterol coronary disease, hyperlipidemia, kidney disease arthritis    Differential diagnosis before testing included chest wall pain, acute coronary syndrome, anxiety, GERD    Co-morbidities that add to the complexity of management include: Patient had recent cardiac stent placed on the fifth    Testing ordered during this visit included chest x-ray EKG baseline labs 2 sets of cardiac enzymes    Radiographic images  I personally reviewed the radiographs and my individual interpretation shows no acute process  I also reviewed the official reports that showed XR CHEST AP PORTABLE  (CPT=71045)  Result Date: 5/26/2025  PROCEDURE:  XR CHEST AP PORTABLE  (CPT=71045)  TECHNIQUE:  AP chest radiograph was obtained.  COMPARISON:  [facility] , XR, XR CHEST AP PORTABLE  (CPT=71045), 4/08/2025, 11:56 AM.  INDICATIONS:  Chest tightness, had stents placed 05/05  PATIENT STATED HISTORY: (As transcribed by Technologist)  Patient offered no additional history at this time.    FINDINGS:              CONCLUSION:  Stable cardiac and mediastinal contours with aortic stent graft again noted.  No pulmonary edema or focal airspace consolidation.  Subsegmental atelectasis or scar of the peripheral left lung base.  No significant pleural effusion or appreciable pneumothorax.   LOCATION:  [facility]      Dictated by (CST): Tristen Resendiz MD on 5/26/2025 at 11:39 PM     Finalized by (CST): Tristen Resendiz MD on 5/26/2025 at 11:40 PM           External chart review showed review of Care Everywhere in [facility] system show patient had stent placement in beginning of this month.    History obtained by an independent source included from patient,    Discussion of management with patient    Social determinants of health that affect care include recent cardiac stent      Medications Provided: Potassium    Course of Events during Emergency Room Visit include 59-year-old female presents emergency room for chest pain.  Patient has no pain currently she speaking full complete sentences EKG shows no acute process her initial troponin is negative will get a second troponin if negative plan for discharge, follow-up with a cardiologist at [facility].  Patient potassium is found to be low will replace orally she is on a water pill which may be contributing to low potassium.  Patient to follow-up with primary care physician and cardiology as outpatient          Disposition:          Discharge  I have discussed with the patient the results of test, differential diagnosis, treatment plan, warning signs and symptoms which should prompt immediate return.  They expressed understanding of these instructions and agrees to the following plan provided.  They were given written discharge instructions and agrees to return for any concerns and voiced understanding and all questions were answered.           Medical Decision Making      Disposition and Plan     Clinical Impression:  1. Chest pain of uncertain etiology    2. Anemia, unspecified type     3. Chronic kidney disease, unspecified CKD stage    4. Hyponatremia         Disposition:  Discharge  5/27/2025 12:58 am    Follow-up:  Bridger Avendano MD  [address] [place] [address] [id number] [phone]    Schedule an appointment as soon as possible for a visit            Medications Prescribed:  Current Discharge Medication List                Supplementary Documentation:

## 2025-05-27 NOTE — ED INITIAL ASSESSMENT (HPI)
Patient complains of R sided chest pain radiating around to back. Patient describes it as tightness. Recent stent placement on 5/5.

## 2025-05-27 NOTE — TELEPHONE ENCOUNTER
[employer] message sent to patient in response to Dr. Jerry's Smooth Move message received--->see message; as well as clinical reference Dr. Jerry's Smooth Move message sent.

## 2025-05-28 ENCOUNTER — APPOINTMENT (OUTPATIENT)
Dept: CARDIAC REHAB | Facility: HOSPITAL | Age: 60
End: 2025-05-28
Attending: INTERNAL MEDICINE
Payer: COMMERCIAL

## 2025-05-29 ENCOUNTER — TELEPHONE (OUTPATIENT)
Dept: FAMILY MEDICINE CLINIC | Facility: CLINIC | Age: 60
End: 2025-05-29

## 2025-05-29 NOTE — PROGRESS NOTES
CHIEF COMPLAINT:     Chief Complaint   Patient presents with    Wound Care     Patients is here for a follow up.Patients stated no issue at this moment      HPI:   Information obtained from patient and chart  5-16-25 INITIAL:  58 YO female with htn, obesity, hypercholesterolemia, and adjustment disorder under went a percutaneous access for complex endovascular repair of an aortic dissection .  She developed a right groin infection.  She was admitted from 5-5 to 5-10.  On 5-5-25 she underwent a superficial abscess I&D, followed by debridement of right groin with placement of integra on 5-7-25.  Operative cultures grew pseudomonas and infectious disease recommended levaquin on discharge.  She is presenting today with wound vac in place.  She states she has a couple days of abx left.  There is not any s/s of infection noted. Patient is anxious. She is ambulatory.    5-23-25 patient returns. She followed up with primary and expressed depression, was referred to dee burgess.  She states that they called her this am and they will be calling back tomorrow.  [facility] has been changing vac. Wound is looking very good. Vac dc'd, will transition to local dressings of go and silver alginate. Suspect we may be able to transition to triad paste next week. Patient has an appointment with dr. Holder next week. Patient has no c/o pain or s/s of infection.    5-30-25 patient returns.  On May 26 she presented to ed with chest pain. Her potassium was found to be low and so she was started on oral potassium supplement. She followed up with cardiology earlier this week and they were thinking it may have been related to constipation.  Patient has been dressing her wound with go and silver alginate.  [facility] continues to come twice weekly. I am concerned the dressings are not getting in/staying int he wound and I would like the patient to be able to shower, will have her dress with gentamicin 1-2x daily. Patient given some cotton tip  applicators and cover dressings. We discussed utilizing gauze in the shower for cleansing (instead of her washcloth)/drying.  No acute s/s of infection or c/o pain.    MEDICATIONS:   Medications - Current[1]  ALLERGIES:   Allergies[2]   REVIEW OF SYSTEMS:   This information was obtained from the patient/family and chart.    See HPI for pertinent positives, otherwise 10 pt ROS negative.    HISTORY:   Past medical, surgical, family and social history updated where appropriate.    PHYSICAL EXAM:     Vitals:    05/30/25 0851   BP: 147/84   Pulse: 95   Resp: 16   Temp: 98 °F (36.7 °C)         Estimated body mass index is 34.95 kg/m² as calculated from the following:    Height as of 5/26/25: 65\".    Weight as of 5/26/25: 210 lb (95.3 kg).   POC Glucose   Date Value Ref Range Status   05/07/2025 107 (H) 70 - 99 mg/dL Final   04/09/2025 103 (H) 70 - 99 mg/dL Final   04/01/2025 127 (H) 70 - 99 mg/dL Final       Vital signs reviewed.Appears stated age, well groomed.    Constitutional:  Bp wnl for patient. Pulse Regular and wnl for patient. Respirations easy and unlabored. Temperature wnl. Obese.  Appearance neat and clean. Appears in no acute distress. Well nourished and well developed.    Musculoskeletal:  Gait and station stable   Integumentary:  refer to wound characteristics and images   Psychiatric:  Judgment and insight intact. Alert and oriented times 3. No evidence of depression, anxiety, or agitation. Calm, cooperative, and communicative.   DIAGNOSTICS:     Lab Results   Component Value Date    BUN 15 05/26/2025    CREATSERUM 1.54 (H) 05/26/2025    ALB 4.5 05/26/2025    TP 7.6 05/26/2025    A1C 5.9 (H) 04/05/2025       WOUND ASSESSMENT:     Wound 05/16/25 #1 Groin Right (Active)   Date First Assessed/Time First Assessed: 05/16/25 0842    Wound Number (Wound Clinic Only): #1  Primary Wound Type: Old surgical  Location: Groin  Wound Location Orientation: Right      Assessments 5/16/2025  8:50 AM 5/30/2025  8:52 AM    Wound Image       Drainage Amount Large Small   Drainage Description Sanguineous Serosanguineous   Treatments Wound Vac - Neg Pressure --   Wound Vac Brand KCI --   Wound Length (cm) 0.4 cm (slight stretch) 0.3 cm (slight pull)   Wound Width (cm) 3.2 cm 1.2 cm   Wound Surface Area (cm^2) 1.01 cm^2 0.28 cm^2   Wound Depth (cm) 0.9 cm 0.5 cm   Wound Volume (cm^3) 0.603 cm^3 0.094 cm^3   Wound Healing % -- 84   Margins Well-defined edges Well-defined edges   Non-staged Wound Description Full thickness Full thickness   Pamela-wound Assessment Edema;Induration Clean   Wound Granulation Tissue Pink;Spongy;Red Firm;Pink   Wound Bed Granulation (%) 60 % 100 %   Wound Bed Slough (%) 10 % --   Wound Odor -- None   Shape 30% integrated skin sub, 7 clear sutures noted to periwound --       No associated orders.          ASSESSMENT AND PLAN:      1. Disruption of external surgical wound, sequela    2. Controlled type 2 diabetes mellitus without complication, without long-term current use of insulin (HCC)          Risks, benefits, and alternatives of current treatment plan discussed in detail.  Questions and concerns addressed. Red flags to RTC or ED reviewed.  Patient (or parent) agrees to plan.      NOTE TO PATIENT: The 21st Century Cures Act makes clinical notes like these available to patients in the interest of transparency. Clinical notes are medical documents used by physicians and care providers to communicate with each other. These documents include medical language and terminology, abbreviations, and treatment information that may sound technical and at times possibly unfamiliar. In addition, at times, the verbiage may appear blunt or direct. These documents are one tool providers use to communicate relevant information and clinical opinions of the care providers in a way that allows common understanding of the clinical context.   I spent   30 minutes with the patient. This time included:    preparing to see the patient  (eg, review notes and recent diagnostics),  seeing the patient, obtaining and/or reviewing separately obtained history, performing a medically appropriate examination and/or evaluation, counseling and educating the patient, documenting in the record, rx  DISCHARGE:      Patient Instructions   Please return:1 week-for RN visit for wound assessment.    2 weeks with [facility] care Monday and Wednesday-see below orders      Patient discharge and wound care instructions  Alisha Wilde  5/30/2025           You may shower and cleanse area with mild soap and water, rinse wound with saline or wound cleanser, dab dry with gauze and apply your dressings as directed.       Changing your dressing:          TWICE DAILY  Wash your hands with soap and water.  Ensure that the old dressing is removed completely. Place it in a plastic bag and throw it in the trash.  Cleanse the wound with hypochlorous wound cleanser (ie. Anasept, vashe, pure and clean).  It's ok to “scrub” your wound with the gauze, small amount of bleeding with cleansing is normal and ok.  Apply the following dressings:  GENTAMICIN OINTMENT>COVER GAUZE      Nutrition and blood sugar control:  Focus on the following:  Protein: Meats, beans, eggs, milk and yogurt particularly Greek yogurt), tofu, soy nuts, soy protein products (Follow the protein handout in your welcome folder)  Vitamin C: Citrus fruits and juices, strawberries, tomatoes, tomato juice, peppers, baked potatoes, spinach, broccoli, cauliflower, [place] sprouts, cabbage  Vitamin A: Dark green, leafy vegetables, orange or yellow vegetables, cantaloupe, fortified dairy products, liver, fortified cereals  Zinc: Fortified cereals, red meats, seafood  Consider supplementing with Rafael by [employer]. It can be purchased on amazon, [employer] website, or local pharmacy may be able to order it for you.  (These are essential branch chain amino acids that help with tissue building and wound  healing).   When your blood sugar is consistently elevated greater than 180 your body can't heal or fight infection.     Concerns:  Signs of infection may include the following:  Increase in redness  Red \"streaks\" from wound  Increase in swelling  Fever  Unusual odor  Change in the amount of wound drainage     Should you experience any significant changes in your wound(s) or have any questions regarding your home care instructions please contact the [facility] @ [phone] If after regular business hours, please call your family doctor or local emergency room. The treatment plan has been discussed at length between you and your provider. Follow all instructions carefully, it is very important. If you do not follow all instructions you are at risk of your wound not healing, infection, possible loss of limb and even loss of life.    Danika Hernandez FNP-C, CWCN-AP, CFCN, CSWS, WCC, DWC  5/30/2025            [1]   Current Outpatient Medications:     gentamicin 0.1 % External Ointment, Apply 1 Application topically 2 (two) times daily for 14 days., Disp: 30 g, Rfl: 0    hydroCHLOROthiazide 12.5 MG Oral Tab, Take 1 tablet (12.5 mg total) by mouth daily., Disp: 90 tablet, Rfl: 0    carvedilol 25 MG Oral Tab, , Disp: , Rfl:     furosemide 40 MG Oral Tab, Take 1 tablet (40 mg total) by mouth daily., Disp: , Rfl:     clopidogrel 75 MG Oral Tab, Take 1 tablet (75 mg total) by mouth daily., Disp: , Rfl:     thiamine 100 MG Oral Tab, TAKE 1 TABLET DAILY WITH FEEDING TUBE, Disp: 90 tablet, Rfl: 1    cloNIDine 0.1 MG Oral Tab, Take 0.5 tablets (0.05 mg total) by mouth daily., Disp: , Rfl:     levocetirizine 5 MG Oral Tab, Take 1 tablet (5 mg total) by mouth every evening., Disp: 30 tablet, Rfl: 1    hydrALAZINE 100 MG Oral Tab, Take 1 tablet (100 mg total) by mouth in the morning, at noon, and at bedtime., Disp: 90 tablet, Rfl: 2    acetaminophen 500 MG Oral Tab, Take 2 tablets (1,000 mg total) by mouth every 6  (six) hours as needed for Pain., Disp: , Rfl:     ezetimibe 10 MG Oral Tab, Take 1 tablet (10 mg total) by mouth in the morning., Disp: , Rfl:     pantoprazole 40 MG Oral Tab EC, Take 1 tablet (40 mg total) by mouth every morning before breakfast., Disp: 90 tablet, Rfl: 3    bisacodyl 5 MG Oral Tab EC, Take 1 tablet (5 mg total) by mouth daily as needed., Disp: 30 tablet, Rfl: 0    docusate sodium (COLACE) 100 MG Oral Cap, Take 1 capsule (100 mg total) by mouth 2 (two) times daily as needed for constipation., Disp: 180 capsule, Rfl: 1    Ferrous Gluconate 324 (37.5 Fe) MG Oral Tab, Take 1 tablet (324 mg total) by mouth daily., Disp: 90 tablet, Rfl: 1    azelastine 0.1 % Nasal Solution, 1 spray by Nasal route 2 (two) times daily., Disp: 3 each, Rfl: 1    mirtazapine 7.5 MG Oral Tab, 1 tablet (7.5 mg total) by Per G Tube route nightly. (Patient taking differently: 1 tablet (7.5 mg total) by Per G Tube route as needed.), Disp: 30 tablet, Rfl: 0    amLODIPine 10 MG Oral Tab, Take 1 tablet (10 mg total) by mouth daily., Disp: 90 tablet, Rfl: 3  [2]   Allergies  Allergen Reactions    Atorvastatin MYALGIA    Pravastatin MYALGIA    Rosuvastatin MYALGIA    Seasonal Runny nose

## 2025-05-30 ENCOUNTER — OFFICE VISIT (OUTPATIENT)
Dept: WOUND CARE | Facility: HOSPITAL | Age: 60
End: 2025-05-30
Attending: NURSE PRACTITIONER
Payer: COMMERCIAL

## 2025-05-30 ENCOUNTER — APPOINTMENT (OUTPATIENT)
Dept: CARDIAC REHAB | Facility: HOSPITAL | Age: 60
End: 2025-05-30
Attending: INTERNAL MEDICINE
Payer: COMMERCIAL

## 2025-05-30 VITALS
SYSTOLIC BLOOD PRESSURE: 147 MMHG | HEART RATE: 95 BPM | RESPIRATION RATE: 16 BRPM | TEMPERATURE: 98 F | DIASTOLIC BLOOD PRESSURE: 84 MMHG

## 2025-05-30 DIAGNOSIS — E11.9 CONTROLLED TYPE 2 DIABETES MELLITUS WITHOUT COMPLICATION, WITHOUT LONG-TERM CURRENT USE OF INSULIN (HCC): ICD-10-CM

## 2025-05-30 DIAGNOSIS — T81.31XS DISRUPTION OF EXTERNAL SURGICAL WOUND, SEQUELA: Primary | ICD-10-CM

## 2025-05-30 PROCEDURE — 99214 OFFICE O/P EST MOD 30 MIN: CPT | Performed by: NURSE PRACTITIONER

## 2025-05-30 RX ORDER — GENTAMICIN SULFATE 1 MG/G
1 OINTMENT TOPICAL 2 TIMES DAILY
Qty: 30 G | Refills: 0 | Status: SHIPPED | OUTPATIENT
Start: 2025-05-30 | End: 2025-06-13

## 2025-05-30 NOTE — PATIENT INSTRUCTIONS
Please return:1 week-for RN visit for wound assessment.    2 weeks with Danika       [facility] home health care Monday and Wednesday-see below orders      Patient discharge and wound care instructions  Alisha Princen  5/30/2025           You may shower and cleanse area with mild soap and water, rinse wound with saline or wound cleanser, dab dry with gauze and apply your dressings as directed.       Changing your dressing:          TWICE DAILY  Wash your hands with soap and water.  Ensure that the old dressing is removed completely. Place it in a plastic bag and throw it in the trash.  Cleanse the wound with hypochlorous wound cleanser (ie. Anasept, vashe, pure and clean).  It's ok to “scrub” your wound with the gauze, small amount of bleeding with cleansing is normal and ok.  Apply the following dressings:  GENTAMICIN OINTMENT>COVER GAUZE      Nutrition and blood sugar control:  Focus on the following:  Protein: Meats, beans, eggs, milk and yogurt particularly Greek yogurt), tofu, soy nuts, soy protein products (Follow the protein handout in your welcome folder)  Vitamin C: Citrus fruits and juices, strawberries, tomatoes, tomato juice, peppers, baked potatoes, spinach, broccoli, cauliflower, [place] sprouts, cabbage  Vitamin A: Dark green, leafy vegetables, orange or yellow vegetables, cantaloupe, fortified dairy products, liver, fortified cereals  Zinc: Fortified cereals, red meats, seafood  Consider supplementing with Rafael by [employer]. It can be purchased on amazon, [employer] website, or local pharmacy may be able to order it for you.  (These are essential branch chain amino acids that help with tissue building and wound healing).   When your blood sugar is consistently elevated greater than 180 your body can't heal or fight infection.     Concerns:  Signs of infection may include the following:  Increase in redness  Red \"streaks\" from wound  Increase in swelling  Fever  Unusual odor  Change in the amount of wound  drainage     Should you experience any significant changes in your wound(s) or have any questions regarding your home care instructions please contact the wound center [facility] @ [phone] If after regular business hours, please call your family doctor or local emergency room. The treatment plan has been discussed at length between you and your provider. Follow all instructions carefully, it is very important. If you do not follow all instructions you are at risk of your wound not healing, infection, possible loss of limb and even loss of life.

## 2025-05-30 NOTE — PROGRESS NOTES
.Weekly Wound Education Note    Teaching Provided To: Patient  Training Topics: Discharge instructions, Dressing, Cleasing and general instructions  Training Method: Explain/Verbal, Written  Training Response: Patient responds and understands, Reinforcement needed            Gentamicin ointment prescribed this visit.  Patient will apply twice daily and cover with border gauze.  Patient to purchase Anasept spray to cleanse wound.

## 2025-06-02 ENCOUNTER — APPOINTMENT (OUTPATIENT)
Dept: CARDIAC REHAB | Facility: HOSPITAL | Age: 60
End: 2025-06-02
Attending: INTERNAL MEDICINE
Payer: COMMERCIAL

## 2025-06-03 RX ORDER — HYDRALAZINE HYDROCHLORIDE 100 MG/1
100 TABLET, FILM COATED ORAL 3 TIMES DAILY
Qty: 90 TABLET | Refills: 2
Start: 2025-06-03

## 2025-06-03 NOTE — TELEPHONE ENCOUNTER
Hydralazine managed by Cardiology.  Patient needs to contact Cardio Dr. Bassem Harris for refills.

## 2025-06-03 NOTE — TELEPHONE ENCOUNTER
Patient requesting a 90-day supply of the following medications. Cheaper than a 30-day supply only. Preferred pharmacy is [facility] in [place].She DOES NOT have anymore medication .     1, pantoprazole 40 mg daily ====informed that per our records, she still has refills available . Was sent on 3/6/2025 for a year supply.   2.hydralazine 100 mg three times a day   3. Mirtazapine 7.5 mg nightly .       Medication pended. Routing for protocol.  SENT AS HIGH PRIORITY

## 2025-06-03 NOTE — TELEPHONE ENCOUNTER
Please review.  Protocol failed / Has no protocol.    Medication is listed as patient reported.  Written by hospitalist at 2024 admit.  Is patient to continue?       Requested Prescriptions   Pending Prescriptions Disp Refills    mirtazapine 7.5 MG Oral Tab 30 tablet 0     Si tablet (7.5 mg total) by Per G Tube route nightly.       There is no refill protocol information for this order

## 2025-06-04 ENCOUNTER — APPOINTMENT (OUTPATIENT)
Dept: CARDIAC REHAB | Facility: HOSPITAL | Age: 60
End: 2025-06-04
Attending: INTERNAL MEDICINE
Payer: COMMERCIAL

## 2025-06-04 ENCOUNTER — TELEPHONE (OUTPATIENT)
Dept: FAMILY MEDICINE CLINIC | Facility: CLINIC | Age: 60
End: 2025-06-04

## 2025-06-04 RX ORDER — MIRTAZAPINE 7.5 MG/1
7.5 TABLET, FILM COATED ORAL NIGHTLY
Qty: 90 TABLET | Refills: 0 | Status: SHIPPED | OUTPATIENT
Start: 2025-06-04

## 2025-06-04 NOTE — TELEPHONE ENCOUNTER
Patient states that pharmacy told her to call our office about pantoprazole because her insurance will not cover it because patient has been on it for more than 90 days.     Spoke to [facility]. They said that her insurance will only cover the first 90 days. She added a coupon for her and it will cost $18 for a 90 day supply. If patient does not want to pay the $18 a prior authorization will have to be done.     Called patient and explained above. She is okay with paying the $18 and will follow up with [facility] for .     Medication Quantity Refills Start End   pantoprazole 40 MG Oral Tab EC 90 tablet 3 3/6/2025 --   Sig:   Take 1 tablet (40 mg total) by mouth every morning before breakfast.     Route:   Oral

## 2025-06-06 ENCOUNTER — OFFICE VISIT (OUTPATIENT)
Dept: WOUND CARE | Facility: HOSPITAL | Age: 60
End: 2025-06-06
Attending: NURSE PRACTITIONER
Payer: COMMERCIAL

## 2025-06-06 ENCOUNTER — APPOINTMENT (OUTPATIENT)
Dept: CARDIAC REHAB | Facility: HOSPITAL | Age: 60
End: 2025-06-06
Attending: INTERNAL MEDICINE
Payer: COMMERCIAL

## 2025-06-06 ENCOUNTER — TELEPHONE (OUTPATIENT)
Dept: INTERNAL MEDICINE CLINIC | Facility: CLINIC | Age: 60
End: 2025-06-06

## 2025-06-06 VITALS
RESPIRATION RATE: 16 BRPM | HEART RATE: 82 BPM | SYSTOLIC BLOOD PRESSURE: 130 MMHG | TEMPERATURE: 99 F | DIASTOLIC BLOOD PRESSURE: 68 MMHG

## 2025-06-06 DIAGNOSIS — T81.31XS DISRUPTION OF EXTERNAL SURGICAL WOUND, SEQUELA: Primary | ICD-10-CM

## 2025-06-06 PROCEDURE — 99213 OFFICE O/P EST LOW 20 MIN: CPT

## 2025-06-06 NOTE — TELEPHONE ENCOUNTER
Patient calling (verified full name and date of birth).  Relayed [facility] message and she will call her current Cardiologist regarding refill request

## 2025-06-06 NOTE — PROGRESS NOTES
Chief Complaint   Patient presents with    Wound Care     Follow up for right groin wound. No complaints at this time.          Medications - Current[1]    Allergies[2]       HISTORY:     Past medical, surgical, family and social history updated where appropriate.    PHYSICAL EXAM:   /68   Pulse 82   Temp 98.8 °F (37.1 °C)   Resp 16        Vital signs reviewed.      Calf                      Ankle                            Wound 05/16/25 #1 Groin Right (Active)   Date First Assessed/Time First Assessed: 05/16/25 0842    Wound Number (Wound Clinic Only): #1  Primary Wound Type: Old surgical  Location: Groin  Wound Location Orientation: Right      Assessments 5/16/2025  8:50 AM 6/6/2025  8:33 AM   Wound Image       Drainage Amount Large Scant   Drainage Description Sanguineous Serosanguineous   Treatments Wound Vac - Neg Pressure --   Wound Vac Brand KCI --   Wound Length (cm) 0.4 cm 0.1 cm   Wound Width (cm) 3.2 cm 0.4 cm   Wound Surface Area (cm^2) 1.01 cm^2 0.03 cm^2   Wound Depth (cm) 0.9 cm 0.1 cm   Wound Volume (cm^3) 0.603 cm^3 0.002 cm^3   Wound Healing % -- 100   Margins Well-defined edges Well-defined edges   Non-staged Wound Description Full thickness Full thickness   Pamela-wound Assessment Edema;Induration Induration   Wound Granulation Tissue Pink;Spongy;Red Pale Grey;Firm   Wound Bed Granulation (%) 60 % 100 %   Wound Bed Slough (%) 10 % --   Wound Odor -- None   Shape 30% integrated skin sub, 7 clear sutures noted to periwound --       No associated orders.          ASSESSMENT AND PLAN:        Risks, benefits, and alternatives of current treatment plan discussed in detail.  Questions and concerns addressed. Red flags to RTC or ED reviewed.  Patient (or parent) agrees to plan.      No follow-ups on file.  Weekly Wound Education Note    Teaching Provided To: Patient  Training Topics: Dressing, Cleasing and general instructions, Discharge instructions  Training Method: Explain/Verbal,  Written  Training Response: Patient responds and understands        Notes: Wound improving, still slightly open. Continue gentamicin ointment and bordered gauze. Bacitracin used in clinic.               Margo TRAN RN   6/6/2025  8:38 AM             [1]   Current Outpatient Medications:     mirtazapine 7.5 MG Oral Tab, Take 1 tablet (7.5 mg total) by mouth nightly., Disp: 90 tablet, Rfl: 0    gentamicin 0.1 % External Ointment, Apply 1 Application topically 2 (two) times daily for 14 days., Disp: 30 g, Rfl: 0    hydroCHLOROthiazide 12.5 MG Oral Tab, Take 1 tablet (12.5 mg total) by mouth daily., Disp: 90 tablet, Rfl: 0    carvedilol 25 MG Oral Tab, , Disp: , Rfl:     furosemide 40 MG Oral Tab, Take 1 tablet (40 mg total) by mouth daily., Disp: , Rfl:     clopidogrel 75 MG Oral Tab, Take 1 tablet (75 mg total) by mouth daily., Disp: , Rfl:     thiamine 100 MG Oral Tab, TAKE 1 TABLET DAILY WITH FEEDING TUBE, Disp: 90 tablet, Rfl: 1    cloNIDine 0.1 MG Oral Tab, Take 0.5 tablets (0.05 mg total) by mouth daily., Disp: , Rfl:     levocetirizine 5 MG Oral Tab, Take 1 tablet (5 mg total) by mouth every evening., Disp: 30 tablet, Rfl: 1    hydrALAZINE 100 MG Oral Tab, Take 1 tablet (100 mg total) by mouth in the morning, at noon, and at bedtime., Disp: 90 tablet, Rfl: 2    acetaminophen 500 MG Oral Tab, Take 2 tablets (1,000 mg total) by mouth every 6 (six) hours as needed for Pain., Disp: , Rfl:     ezetimibe 10 MG Oral Tab, Take 1 tablet (10 mg total) by mouth in the morning., Disp: , Rfl:     pantoprazole 40 MG Oral Tab EC, Take 1 tablet (40 mg total) by mouth every morning before breakfast., Disp: 90 tablet, Rfl: 3    bisacodyl 5 MG Oral Tab EC, Take 1 tablet (5 mg total) by mouth daily as needed., Disp: 30 tablet, Rfl: 0    docusate sodium (COLACE) 100 MG Oral Cap, Take 1 capsule (100 mg total) by mouth 2 (two) times daily as needed for constipation., Disp: 180 capsule, Rfl: 1    Ferrous Gluconate 324 (37.5 Fe) MG Oral  Tab, Take 1 tablet (324 mg total) by mouth daily., Disp: 90 tablet, Rfl: 1    azelastine 0.1 % Nasal Solution, 1 spray by Nasal route 2 (two) times daily., Disp: 3 each, Rfl: 1    amLODIPine 10 MG Oral Tab, Take 1 tablet (10 mg total) by mouth daily., Disp: 90 tablet, Rfl: 3  [2]   Allergies  Allergen Reactions    Atorvastatin MYALGIA    Pravastatin MYALGIA    Rosuvastatin MYALGIA    Seasonal Runny nose

## 2025-06-09 ENCOUNTER — APPOINTMENT (OUTPATIENT)
Dept: CARDIAC REHAB | Facility: HOSPITAL | Age: 60
End: 2025-06-09
Attending: INTERNAL MEDICINE
Payer: COMMERCIAL

## 2025-06-09 NOTE — TELEPHONE ENCOUNTER
Patient called us back and she was able to see Irene AGEE message below and she verbalized understanding.   Last read by Alisha Wilde at 9:09AM on 6/9/2025.

## 2025-06-10 ENCOUNTER — TELEPHONE (OUTPATIENT)
Dept: FAMILY MEDICINE CLINIC | Facility: CLINIC | Age: 60
End: 2025-06-10

## 2025-06-10 NOTE — TELEPHONE ENCOUNTER
Received HH order from [facility] order # [id number] order signed by Dr. Avendano and faxed back.

## 2025-06-11 DIAGNOSIS — K21.9 GASTROESOPHAGEAL REFLUX DISEASE, UNSPECIFIED WHETHER ESOPHAGITIS PRESENT: ICD-10-CM

## 2025-06-12 NOTE — PROGRESS NOTES
CHIEF COMPLAINT:     Chief Complaint   Patient presents with    Wound Recheck     Patient arrives on foot to wound care appointment. She denies pain to her wound. No new questions or concerns this visit.     HPI:   Information obtained from patient and chart  5-16-25 INITIAL:  60 YO female with htn, obesity, hypercholesterolemia, and adjustment disorder under went a percutaneous access for complex endovascular repair of an aortic dissection .  She developed a right groin infection.  She was admitted from 5-5 to 5-10.  On 5-5-25 she underwent a superficial abscess I&D, followed by debridement of right groin with placement of integra on 5-7-25.  Operative cultures grew pseudomonas and infectious disease recommended levaquin on discharge.  She is presenting today with wound vac in place.  She states she has a couple days of abx left.  There is not any s/s of infection noted. Patient is anxious. She is ambulatory.    5-23-25 patient returns. She followed up with primary and expressed depression, was referred to dee burgess.  She states that they called her this am and they will be calling back tomorrow.  [facility] has been changing vac. Wound is looking very good. Vac dc'd, will transition to local dressings of go and silver alginate. Suspect we may be able to transition to triad paste next week. Patient has an appointment with dr. Holder next week. Patient has no c/o pain or s/s of infection.    5-30-25 patient returns.  On May 26 she presented to ed with chest pain. Her potassium was found to be low and so she was started on oral potassium supplement. She followed up with cardiology earlier this week and they were thinking it may have been related to constipation.  Patient has been dressing her wound with go and silver alginate.  [facility] continues to come twice weekly. I am concerned the dressings are not getting in/staying int he wound and I would like the patient to be able to shower, will have her dress with  gentamicin 1-2x daily. Patient given some cotton tip applicators and cover dressings. We discussed utilizing gauze in the shower for cleansing (instead of her washcloth)/drying.  No acute s/s of infection or c/o pain.    6-13-25 patient returns.  She followed up with nurse last week and wound was improving.  She has continued with gentamicin. The wound is resolved. We discussed keeping area clean and dry and may use antifungal powder as needed.  Patient should be \"cognizant\" of clothing choices.  Patient dc'd from clinic, dr gutierrez updated.    MEDICATIONS:   Medications - Current[1]  ALLERGIES:   Allergies[2]   REVIEW OF SYSTEMS:   This information was obtained from the patient/family and chart.    See HPI for pertinent positives, otherwise 10 pt ROS negative.    HISTORY:   Past medical, surgical, family and social history updated where appropriate.    PHYSICAL EXAM:     Vitals:    06/13/25 1107   BP: 123/64   Pulse: 89   Resp: 14   Temp: 97.9 °F (36.6 °C)           Estimated body mass index is 34.95 kg/m² as calculated from the following:    Height as of 5/26/25: 65\".    Weight as of 5/26/25: 210 lb (95.3 kg).   POC Glucose   Date Value Ref Range Status   05/07/2025 107 (H) 70 - 99 mg/dL Final   04/09/2025 103 (H) 70 - 99 mg/dL Final   04/01/2025 127 (H) 70 - 99 mg/dL Final       Vital signs reviewed.Appears stated age, well groomed.    Constitutional:  Bp wnl for patient. Pulse Regular and wnl for patient. Respirations easy and unlabored. Temperature wnl. Obese.  Appearance neat and clean. Appears in no acute distress. Well nourished and well developed.    Musculoskeletal:  Gait and station stable   Integumentary:  refer to wound characteristics and images   Psychiatric:  Judgment and insight intact. Alert and oriented times 3. No evidence of depression, anxiety, or agitation. Calm, cooperative, and communicative.   DIAGNOSTICS:     Lab Results   Component Value Date    BUN 15 05/26/2025    CREATSERUM 1.54 (H)  05/26/2025    ALB 4.5 05/26/2025    TP 7.6 05/26/2025    A1C 5.9 (H) 04/05/2025       WOUND ASSESSMENT:     Wound 05/16/25 #1 Groin Right (Active)   Date First Assessed/Time First Assessed: 05/16/25 0842    Wound Number (Wound Clinic Only): #1  Primary Wound Type: Old surgical  Location: Groin  Wound Location Orientation: Right      Assessments 5/16/2025  8:50 AM 6/13/2025 11:06 AM   Wound Image       Drainage Amount Large None   Drainage Description Sanguineous --   Treatments Wound Vac - Neg Pressure --   Wound Vac Brand KCI --   Wound Length (cm) 0.4 cm (slight stretch) 0.1 cm   Wound Width (cm) 3.2 cm 0.1 cm   Wound Surface Area (cm^2) 1.01 cm^2 0.01 cm^2   Wound Depth (cm) 0.9 cm 0.1 cm   Wound Volume (cm^3) 0.603 cm^3 0.001 cm^3   Wound Healing % -- 100   Margins Well-defined edges Well-defined edges   Non-staged Wound Description Full thickness Full thickness   Pamela-wound Assessment Edema;Induration Induration   Wound Granulation Tissue Pink;Spongy;Red Pale Grey;Firm   Wound Bed Granulation (%) 60 % 100 %   Wound Bed Slough (%) 10 % --   Wound Odor -- None   Shape 30% integrated skin sub, 7 clear sutures noted to periwound --   Tunneling? -- No   Undermining? -- No   Sinus Tracts? -- No       No associated orders.          ASSESSMENT AND PLAN:      1. Disruption of external surgical wound, sequela    2. Controlled type 2 diabetes mellitus without complication, without long-term current use of insulin (HCC)      Risks, benefits, and alternatives of current treatment plan discussed in detail.  Questions and concerns addressed. Red flags to RTC or ED reviewed.  Patient (or parent) agrees to plan.      NOTE TO PATIENT: The 21st Century Cures Act makes clinical notes like these available to patients in the interest of transparency. Clinical notes are medical documents used by physicians and care providers to communicate with each other. These documents include medical language and terminology, abbreviations, and  treatment information that may sound technical and at times possibly unfamiliar. In addition, at times, the verbiage may appear blunt or direct. These documents are one tool providers use to communicate relevant information and clinical opinions of the care providers in a way that allows common understanding of the clinical context.   I spent  25 minutes with the patient. This time included:    preparing to see the patient (eg, review notes and recent diagnostics),  seeing the patient, obtaining and/or reviewing separately obtained history, performing a medically appropriate examination and/or evaluation, counseling and educating the patient, documenting in the record,update to vascular  DISCHARGE:      Patient Instructions   Patient discharge and wound care instructions  Alisha Wilde  6/13/2025     Keep area clean and dry, may use antifungal powder as needed    Return to clinic as needed   Danika Hernandez FNP-C, CWCN-AP, CFCN, CSWS, WCC, DWC  6/13/2025            [1]   Current Outpatient Medications:     mirtazapine 7.5 MG Oral Tab, Take 1 tablet (7.5 mg total) by mouth nightly., Disp: 90 tablet, Rfl: 0    gentamicin 0.1 % External Ointment, Apply 1 Application topically 2 (two) times daily for 14 days., Disp: 30 g, Rfl: 0    hydroCHLOROthiazide 12.5 MG Oral Tab, Take 1 tablet (12.5 mg total) by mouth daily., Disp: 90 tablet, Rfl: 0    carvedilol 25 MG Oral Tab, , Disp: , Rfl:     furosemide 40 MG Oral Tab, Take 1 tablet (40 mg total) by mouth daily., Disp: , Rfl:     clopidogrel 75 MG Oral Tab, Take 1 tablet (75 mg total) by mouth daily., Disp: , Rfl:     thiamine 100 MG Oral Tab, TAKE 1 TABLET DAILY WITH FEEDING TUBE, Disp: 90 tablet, Rfl: 1    cloNIDine 0.1 MG Oral Tab, Take 0.5 tablets (0.05 mg total) by mouth daily., Disp: , Rfl:     levocetirizine 5 MG Oral Tab, Take 1 tablet (5 mg total) by mouth every evening., Disp: 30 tablet, Rfl: 1    hydrALAZINE 100 MG Oral Tab, Take 1 tablet (100 mg total) by mouth in  the morning, at noon, and at bedtime., Disp: 90 tablet, Rfl: 2    acetaminophen 500 MG Oral Tab, Take 2 tablets (1,000 mg total) by mouth every 6 (six) hours as needed for Pain., Disp: , Rfl:     ezetimibe 10 MG Oral Tab, Take 1 tablet (10 mg total) by mouth in the morning., Disp: , Rfl:     pantoprazole 40 MG Oral Tab EC, Take 1 tablet (40 mg total) by mouth every morning before breakfast., Disp: 90 tablet, Rfl: 3    bisacodyl 5 MG Oral Tab EC, Take 1 tablet (5 mg total) by mouth daily as needed., Disp: 30 tablet, Rfl: 0    docusate sodium (COLACE) 100 MG Oral Cap, Take 1 capsule (100 mg total) by mouth 2 (two) times daily as needed for constipation., Disp: 180 capsule, Rfl: 1    Ferrous Gluconate 324 (37.5 Fe) MG Oral Tab, Take 1 tablet (324 mg total) by mouth daily., Disp: 90 tablet, Rfl: 1    azelastine 0.1 % Nasal Solution, 1 spray by Nasal route 2 (two) times daily., Disp: 3 each, Rfl: 1    amLODIPine 10 MG Oral Tab, Take 1 tablet (10 mg total) by mouth daily., Disp: 90 tablet, Rfl: 3  [2]   Allergies  Allergen Reactions    Atorvastatin MYALGIA    Pravastatin MYALGIA    Rosuvastatin MYALGIA    Seasonal Runny nose

## 2025-06-13 ENCOUNTER — OFFICE VISIT (OUTPATIENT)
Dept: WOUND CARE | Facility: HOSPITAL | Age: 60
End: 2025-06-13
Attending: NURSE PRACTITIONER
Payer: COMMERCIAL

## 2025-06-13 VITALS
SYSTOLIC BLOOD PRESSURE: 123 MMHG | DIASTOLIC BLOOD PRESSURE: 64 MMHG | HEART RATE: 89 BPM | TEMPERATURE: 98 F | RESPIRATION RATE: 14 BRPM

## 2025-06-13 DIAGNOSIS — E11.9 CONTROLLED TYPE 2 DIABETES MELLITUS WITHOUT COMPLICATION, WITHOUT LONG-TERM CURRENT USE OF INSULIN (HCC): ICD-10-CM

## 2025-06-13 DIAGNOSIS — T81.31XS DISRUPTION OF EXTERNAL SURGICAL WOUND, SEQUELA: Primary | ICD-10-CM

## 2025-06-13 PROCEDURE — 99213 OFFICE O/P EST LOW 20 MIN: CPT | Performed by: NURSE PRACTITIONER

## 2025-06-13 RX ORDER — PANTOPRAZOLE SODIUM 40 MG/1
40 TABLET, DELAYED RELEASE ORAL
Qty: 90 TABLET | Refills: 3 | OUTPATIENT
Start: 2025-06-13

## 2025-06-13 NOTE — PROGRESS NOTES
.Weekly Wound Education Note    Teaching Provided To: Patient  Training Topics: Discharge instructions, Skin care  Training Method: Explain/Verbal, Written  Training Response: Patient responds and understands        Notes: Per provider, wound is healed. Ok for pt to use anti-fungal powder in area. Pt is to keep area dry. Pt discharged from clinic at this time.

## 2025-06-13 NOTE — PATIENT INSTRUCTIONS
Patient discharge and wound care instructions  Alisha Wilde  6/13/2025     Keep area clean and dry, may use antifungal powder as needed    Return to clinic as needed

## 2025-06-16 ENCOUNTER — APPOINTMENT (OUTPATIENT)
Dept: CT IMAGING | Facility: HOSPITAL | Age: 60
End: 2025-06-16
Attending: STUDENT IN AN ORGANIZED HEALTH CARE EDUCATION/TRAINING PROGRAM
Payer: COMMERCIAL

## 2025-06-16 ENCOUNTER — HOSPITAL ENCOUNTER (OUTPATIENT)
Facility: HOSPITAL | Age: 60
Setting detail: OBSERVATION
Discharge: HOME | End: 2025-06-17
Attending: STUDENT IN AN ORGANIZED HEALTH CARE EDUCATION/TRAINING PROGRAM | Admitting: HOSPITALIST
Payer: COMMERCIAL

## 2025-06-16 ENCOUNTER — APPOINTMENT (OUTPATIENT)
Dept: GENERAL RADIOLOGY | Facility: HOSPITAL | Age: 60
End: 2025-06-16
Payer: COMMERCIAL

## 2025-06-16 DIAGNOSIS — Z98.890 STATUS POST AORTIC DISSECTION REPAIR: ICD-10-CM

## 2025-06-16 DIAGNOSIS — R07.9 CHEST PAIN OF UNCERTAIN ETIOLOGY: Primary | ICD-10-CM

## 2025-06-16 LAB
ALBUMIN SERPL-MCNC: 4.8 G/DL (ref 3.2–4.8)
ALBUMIN/GLOB SERPL: 1.5 {RATIO} (ref 1–2)
ALP LIVER SERPL-CCNC: 63 U/L (ref 50–130)
ALT SERPL-CCNC: <7 U/L (ref 10–49)
ANION GAP SERPL CALC-SCNC: 13 MMOL/L (ref 0–18)
AST SERPL-CCNC: 12 U/L (ref ?–34)
BASOPHILS # BLD AUTO: 0.02 X10(3) UL (ref 0–0.2)
BASOPHILS NFR BLD AUTO: 0.3 %
BILIRUB SERPL-MCNC: 0.3 MG/DL (ref 0.2–1.1)
BUN BLD-MCNC: 11 MG/DL (ref 9–23)
CALCIUM BLD-MCNC: 9.8 MG/DL (ref 8.7–10.6)
CHLORIDE SERPL-SCNC: 104 MMOL/L (ref 98–112)
CO2 SERPL-SCNC: 22 MMOL/L (ref 21–32)
CREAT BLD-MCNC: 1.36 MG/DL (ref 0.55–1.02)
EGFRCR SERPLBLD CKD-EPI 2021: 44 ML/MIN/1.73M2 (ref 60–?)
EOSINOPHIL # BLD AUTO: 0.06 X10(3) UL (ref 0–0.7)
EOSINOPHIL NFR BLD AUTO: 1 %
ERYTHROCYTE [DISTWIDTH] IN BLOOD BY AUTOMATED COUNT: 17.4 %
GLOBULIN PLAS-MCNC: 3.1 G/DL (ref 2–3.5)
GLUCOSE BLD-MCNC: 97 MG/DL (ref 70–99)
HCT VFR BLD AUTO: 33.8 % (ref 35–48)
HGB BLD-MCNC: 10.7 G/DL (ref 12–16)
IMM GRANULOCYTES # BLD AUTO: 0.01 X10(3) UL (ref 0–1)
IMM GRANULOCYTES NFR BLD: 0.2 %
LYMPHOCYTES # BLD AUTO: 1.63 X10(3) UL (ref 1–4)
LYMPHOCYTES NFR BLD AUTO: 27.1 %
MAGNESIUM SERPL-MCNC: 2.3 MG/DL (ref 1.6–2.6)
MCH RBC QN AUTO: 24.4 PG (ref 26–34)
MCHC RBC AUTO-ENTMCNC: 31.7 G/DL (ref 31–37)
MCV RBC AUTO: 77.2 FL (ref 80–100)
MONOCYTES # BLD AUTO: 0.41 X10(3) UL (ref 0.1–1)
MONOCYTES NFR BLD AUTO: 6.8 %
NEUTROPHILS # BLD AUTO: 3.89 X10 (3) UL (ref 1.5–7.7)
NEUTROPHILS # BLD AUTO: 3.89 X10(3) UL (ref 1.5–7.7)
NEUTROPHILS NFR BLD AUTO: 64.6 %
OSMOLALITY SERPL CALC.SUM OF ELEC: 287 MOSM/KG (ref 275–295)
PLATELET # BLD AUTO: 241 10(3)UL (ref 150–450)
POTASSIUM SERPL-SCNC: 2.7 MMOL/L (ref 3.5–5.1)
PROT SERPL-MCNC: 7.9 G/DL (ref 5.7–8.2)
RBC # BLD AUTO: 4.38 X10(6)UL (ref 3.8–5.3)
SODIUM SERPL-SCNC: 139 MMOL/L (ref 136–145)
TROPONIN I SERPL HS-MCNC: 18 NG/L (ref ?–34)
WBC # BLD AUTO: 6 X10(3) UL (ref 4–11)

## 2025-06-16 PROCEDURE — 74175 CTA ABDOMEN W/CONTRAST: CPT | Performed by: STUDENT IN AN ORGANIZED HEALTH CARE EDUCATION/TRAINING PROGRAM

## 2025-06-16 PROCEDURE — 71275 CT ANGIOGRAPHY CHEST: CPT | Performed by: STUDENT IN AN ORGANIZED HEALTH CARE EDUCATION/TRAINING PROGRAM

## 2025-06-16 RX ORDER — MORPHINE SULFATE 4 MG/ML
4 INJECTION, SOLUTION INTRAMUSCULAR; INTRAVENOUS ONCE
Status: COMPLETED | OUTPATIENT
Start: 2025-06-16 | End: 2025-06-16

## 2025-06-16 RX ORDER — ONDANSETRON 2 MG/ML
4 INJECTION INTRAMUSCULAR; INTRAVENOUS ONCE
Status: COMPLETED | OUTPATIENT
Start: 2025-06-16 | End: 2025-06-16

## 2025-06-16 RX ORDER — POTASSIUM CHLORIDE 1500 MG/1
80 TABLET, EXTENDED RELEASE ORAL ONCE
Status: COMPLETED | OUTPATIENT
Start: 2025-06-16 | End: 2025-06-16

## 2025-06-16 NOTE — ED INITIAL ASSESSMENT (HPI)
Patient to ED c/o chest pain that started around 11.  + tightness to the left chest, shoulder, and head.

## 2025-06-17 ENCOUNTER — APPOINTMENT (OUTPATIENT)
Dept: CV DIAGNOSTICS | Facility: HOSPITAL | Age: 60
End: 2025-06-17
Attending: NURSE PRACTITIONER
Payer: COMMERCIAL

## 2025-06-17 VITALS
OXYGEN SATURATION: 96 % | WEIGHT: 198.88 LBS | TEMPERATURE: 98 F | HEIGHT: 65 IN | HEART RATE: 81 BPM | DIASTOLIC BLOOD PRESSURE: 57 MMHG | SYSTOLIC BLOOD PRESSURE: 118 MMHG | BODY MASS INDEX: 33.13 KG/M2 | RESPIRATION RATE: 19 BRPM

## 2025-06-17 PROBLEM — Z98.890 STATUS POST AORTIC DISSECTION REPAIR: Status: ACTIVE | Noted: 2025-06-17

## 2025-06-17 LAB
ALBUMIN SERPL-MCNC: 4.1 G/DL (ref 3.2–4.8)
ALBUMIN/GLOB SERPL: 1.5 {RATIO} (ref 1–2)
ALP LIVER SERPL-CCNC: 51 U/L (ref 50–130)
ALT SERPL-CCNC: <7 U/L (ref 10–49)
ANION GAP SERPL CALC-SCNC: 10 MMOL/L (ref 0–18)
AST SERPL-CCNC: 11 U/L (ref ?–34)
BASOPHILS # BLD AUTO: 0.03 X10(3) UL (ref 0–0.2)
BASOPHILS NFR BLD AUTO: 0.7 %
BILIRUB SERPL-MCNC: 0.3 MG/DL (ref 0.2–1.1)
BUN BLD-MCNC: 9 MG/DL (ref 9–23)
CALCIUM BLD-MCNC: 9.4 MG/DL (ref 8.7–10.6)
CHLORIDE SERPL-SCNC: 107 MMOL/L (ref 98–112)
CO2 SERPL-SCNC: 23 MMOL/L (ref 21–32)
CREAT BLD-MCNC: 1.26 MG/DL (ref 0.55–1.02)
EGFRCR SERPLBLD CKD-EPI 2021: 49 ML/MIN/1.73M2 (ref 60–?)
EOSINOPHIL # BLD AUTO: 0.08 X10(3) UL (ref 0–0.7)
EOSINOPHIL NFR BLD AUTO: 1.9 %
ERYTHROCYTE [DISTWIDTH] IN BLOOD BY AUTOMATED COUNT: 17.6 %
GLOBULIN PLAS-MCNC: 2.7 G/DL (ref 2–3.5)
GLUCOSE BLD-MCNC: 84 MG/DL (ref 70–99)
HCT VFR BLD AUTO: 29.9 % (ref 35–48)
HGB BLD-MCNC: 9.4 G/DL (ref 12–16)
IMM GRANULOCYTES # BLD AUTO: 0.01 X10(3) UL (ref 0–1)
IMM GRANULOCYTES NFR BLD: 0.2 %
LYMPHOCYTES # BLD AUTO: 1.37 X10(3) UL (ref 1–4)
LYMPHOCYTES NFR BLD AUTO: 32.4 %
MAGNESIUM SERPL-MCNC: 2.3 MG/DL (ref 1.6–2.6)
MCH RBC QN AUTO: 24.1 PG (ref 26–34)
MCHC RBC AUTO-ENTMCNC: 31.4 G/DL (ref 31–37)
MCV RBC AUTO: 76.7 FL (ref 80–100)
MONOCYTES # BLD AUTO: 0.44 X10(3) UL (ref 0.1–1)
MONOCYTES NFR BLD AUTO: 10.4 %
NEUTROPHILS # BLD AUTO: 2.3 X10 (3) UL (ref 1.5–7.7)
NEUTROPHILS # BLD AUTO: 2.3 X10(3) UL (ref 1.5–7.7)
NEUTROPHILS NFR BLD AUTO: 54.4 %
OSMOLALITY SERPL CALC.SUM OF ELEC: 288 MOSM/KG (ref 275–295)
PLATELET # BLD AUTO: 226 10(3)UL (ref 150–450)
POTASSIUM SERPL-SCNC: 3.6 MMOL/L (ref 3.5–5.1)
PROT SERPL-MCNC: 6.8 G/DL (ref 5.7–8.2)
RBC # BLD AUTO: 3.9 X10(6)UL (ref 3.8–5.3)
SODIUM SERPL-SCNC: 140 MMOL/L (ref 136–145)
TROPONIN I SERPL HS-MCNC: 18 NG/L (ref ?–34)
TROPONIN I SERPL HS-MCNC: 20 NG/L (ref ?–34)
WBC # BLD AUTO: 4.2 X10(3) UL (ref 4–11)

## 2025-06-17 PROCEDURE — 99232 SBSQ HOSP IP/OBS MODERATE 35: CPT | Performed by: HOSPITALIST

## 2025-06-17 PROCEDURE — 93306 TTE W/DOPPLER COMPLETE: CPT | Performed by: NURSE PRACTITIONER

## 2025-06-17 RX ORDER — HYDROCHLOROTHIAZIDE 12.5 MG/1
12.5 TABLET ORAL DAILY
Status: DISCONTINUED | OUTPATIENT
Start: 2025-06-17 | End: 2025-06-17

## 2025-06-17 RX ORDER — ASPIRIN 81 MG/1
81 TABLET ORAL DAILY
Status: DISCONTINUED | OUTPATIENT
Start: 2025-06-17 | End: 2025-06-17

## 2025-06-17 RX ORDER — ONDANSETRON 2 MG/ML
4 INJECTION INTRAMUSCULAR; INTRAVENOUS EVERY 6 HOURS PRN
Status: DISCONTINUED | OUTPATIENT
Start: 2025-06-17 | End: 2025-06-17

## 2025-06-17 RX ORDER — MORPHINE SULFATE 2 MG/ML
2 INJECTION, SOLUTION INTRAMUSCULAR; INTRAVENOUS EVERY 2 HOUR PRN
Status: DISCONTINUED | OUTPATIENT
Start: 2025-06-17 | End: 2025-06-17

## 2025-06-17 RX ORDER — AMLODIPINE BESYLATE 5 MG/1
10 TABLET ORAL DAILY
Status: DISCONTINUED | OUTPATIENT
Start: 2025-06-17 | End: 2025-06-17

## 2025-06-17 RX ORDER — POTASSIUM CHLORIDE 1500 MG/1
40 TABLET, EXTENDED RELEASE ORAL EVERY 4 HOURS
Status: DISCONTINUED | OUTPATIENT
Start: 2025-06-17 | End: 2025-06-17

## 2025-06-17 RX ORDER — ASPIRIN 81 MG/1
81 TABLET ORAL DAILY
Qty: 30 TABLET | Refills: 0 | Status: SHIPPED | OUTPATIENT
Start: 2025-06-17 | End: 2025-06-25

## 2025-06-17 RX ORDER — HYDRALAZINE HYDROCHLORIDE 50 MG/1
100 TABLET, FILM COATED ORAL 3 TIMES DAILY
Status: DISCONTINUED | OUTPATIENT
Start: 2025-06-17 | End: 2025-06-17

## 2025-06-17 RX ORDER — PANTOPRAZOLE SODIUM 40 MG/1
40 TABLET, DELAYED RELEASE ORAL
Status: DISCONTINUED | OUTPATIENT
Start: 2025-06-17 | End: 2025-06-17

## 2025-06-17 RX ORDER — FERROUS SULFATE 325(65) MG
325 TABLET, DELAYED RELEASE (ENTERIC COATED) ORAL
Status: DISCONTINUED | OUTPATIENT
Start: 2025-06-17 | End: 2025-06-17

## 2025-06-17 RX ORDER — BISACODYL 10 MG
10 SUPPOSITORY, RECTAL RECTAL
Status: DISCONTINUED | OUTPATIENT
Start: 2025-06-17 | End: 2025-06-17

## 2025-06-17 RX ORDER — PROCHLORPERAZINE EDISYLATE 5 MG/ML
5 INJECTION INTRAMUSCULAR; INTRAVENOUS EVERY 8 HOURS PRN
Status: DISCONTINUED | OUTPATIENT
Start: 2025-06-17 | End: 2025-06-17

## 2025-06-17 RX ORDER — SENNOSIDES 8.6 MG
17.2 TABLET ORAL NIGHTLY PRN
Status: DISCONTINUED | OUTPATIENT
Start: 2025-06-17 | End: 2025-06-17

## 2025-06-17 RX ORDER — MORPHINE SULFATE 2 MG/ML
1 INJECTION, SOLUTION INTRAMUSCULAR; INTRAVENOUS EVERY 2 HOUR PRN
Status: DISCONTINUED | OUTPATIENT
Start: 2025-06-17 | End: 2025-06-17

## 2025-06-17 RX ORDER — POLYETHYLENE GLYCOL 3350 17 G/17G
17 POWDER, FOR SOLUTION ORAL DAILY PRN
Status: DISCONTINUED | OUTPATIENT
Start: 2025-06-17 | End: 2025-06-17

## 2025-06-17 RX ORDER — ENOXAPARIN SODIUM 100 MG/ML
40 INJECTION SUBCUTANEOUS DAILY
Status: DISCONTINUED | OUTPATIENT
Start: 2025-06-17 | End: 2025-06-17

## 2025-06-17 RX ORDER — EZETIMIBE 10 MG/1
10 TABLET ORAL DAILY
Status: DISCONTINUED | OUTPATIENT
Start: 2025-06-17 | End: 2025-06-17

## 2025-06-17 RX ORDER — SODIUM PHOSPHATE, DIBASIC AND SODIUM PHOSPHATE, MONOBASIC 7; 19 G/230ML; G/230ML
1 ENEMA RECTAL ONCE AS NEEDED
Status: DISCONTINUED | OUTPATIENT
Start: 2025-06-17 | End: 2025-06-17

## 2025-06-17 RX ORDER — CARVEDILOL 12.5 MG/1
25 TABLET ORAL 2 TIMES DAILY WITH MEALS
Status: DISCONTINUED | OUTPATIENT
Start: 2025-06-17 | End: 2025-06-17

## 2025-06-17 RX ORDER — MIRTAZAPINE 7.5 MG/1
7.5 TABLET, FILM COATED ORAL NIGHTLY
Status: DISCONTINUED | OUTPATIENT
Start: 2025-06-17 | End: 2025-06-17

## 2025-06-17 RX ORDER — FUROSEMIDE 40 MG/1
40 TABLET ORAL DAILY
Status: DISCONTINUED | OUTPATIENT
Start: 2025-06-17 | End: 2025-06-17

## 2025-06-17 RX ORDER — MORPHINE SULFATE 4 MG/ML
4 INJECTION, SOLUTION INTRAMUSCULAR; INTRAVENOUS EVERY 2 HOUR PRN
Status: DISCONTINUED | OUTPATIENT
Start: 2025-06-17 | End: 2025-06-17

## 2025-06-17 RX ORDER — ACETAMINOPHEN 500 MG
500 TABLET ORAL EVERY 4 HOURS PRN
Status: DISCONTINUED | OUTPATIENT
Start: 2025-06-17 | End: 2025-06-17

## 2025-06-17 RX ORDER — CLOPIDOGREL BISULFATE 75 MG/1
75 TABLET ORAL DAILY
Status: DISCONTINUED | OUTPATIENT
Start: 2025-06-17 | End: 2025-06-17

## 2025-06-17 NOTE — HISTORICAL OFFICE NOTE
Facility Logo [facility]  [address] [place] [address], [address] [place] [address]  [phone]      Alisha Wilde  Progress Note  Demographics:  Name: Alisha Wilde YOB: 1965  Age: 59, Female Medical Record No: [id number]  Visited Date/Time: 05/28/2025 01:00 PM    Chief Complaints  ED f/u   ED visit- 5/25- 5/26 for CP   C/O Chest tightness  History of Present Illness  This is a 59 year old female who presents to the [facility] for a symptomatic visit. Patient presented to the ER on 5/26/25 for lower abdominal pain and bloating along with mild epigastric pain. EKG with SR, PAC, no ischemia. Troponin negative x 2.  Chest x-ray with stable cardiac and mediastinal contours with aortic stent grafts again noted.  No pleural effusion or pneumothorax.  Patient believes this pain was secondary to constipation as she is on iron supplementation..  Patient's main concern for today's visit is hypertension.  She is using her RPM program and has had isolated blood pressures that are elevated.  Her blood pressure this morning was 154/85.  Her average blood pressure is 123/70.    She currently  denies chest pain, shortness of breath, dizziness, or lightheadedness.    She was recently hospitalized on May 5, 2025 for right groin abscess and debridement.  Patient is following with Dr. Wood contreras in the wound care nurse for this.  During this hospitalization she was noted to have complete nocturnal AV block with pauses, maximum of 3 seconds.  Her beta-blocker was stopped at that point in time.  Other past medical history includes a type a dissection with surgical repair in November 2024, type B dissection on 3/30/2025 with stent grafting, hypertension, obstructive sleep apnea on CPAP and chronic renal insufficiency        labs 5/26/25: K 3.0, Creatinine 1.54, Hgb 9.6,     Echo with normal LVEF 60-65% (4/8/2025)  Coronary artery calcium score: 69 (5/18/2025)  Normal Nuclear stress test on (4/23/24)  Cardiac risk  factors Never smoked  Past Medical History  1.Type 1 dissection of ascending aorta  2.Abnormal EKG  3.Benign essential HTN  4.Hypercholesteremia  5.Statin intolerance  6.DM (diabetes mellitus) Type 2  7.Increased BMI  8.Obstructive sleep apnea - TANYA  Past Surgical History  1.Type 1 dissection of ascending aorta  2.H/O ascending aorta repair  3.H/O tracheostomy  Family History  1. Father - Hypertension (HTN), primary  2. Mother - Hypertension (HTN), primary; Family history of heart disease  Social History  Smoking status Never smoked  Tobacco usage - No (Non-smoker for personal reasons (finding))  Alcohol usage - Yes (\"none\" )  Review of systems  Cardiovascular Chest pain  No history of MILLER, Palpitations, Syncope, PND, Orthopnea, Edema and Claudication  Physical Examination  Vitals Right Arm Sitting  / 64 mmHg, Pulse rate 78 bpm, Height in 5' 5\", BMI: 34.9, Weight in 210 lbs (or) 95.25 kgs and BSA : 2.13 cc/m²  General Appearance No Acute Distress and Appropriate  Cardiovascular   EKG/Other abnormalities  General: Alert and oriented x 3. No apparent distress. No respiratory or constitutional distress.  HEENT: Normocephalic, anicteric sclera, neck supple.  Neck: No JVD  Cardiac: Regular rate and rhythm. S1, S2 normal. No murmur, pericardial rub, S3.  Lungs: Clear without wheezes, rales, rhonchi or dullness.  Normal excursions and effort.  Abdomen: Soft, non-tender.   Extremities: Without clubbing, cyanosis or edema.    Neurologic: Alert, normal affect.  Skin: Warm and dry.  Allergies  1.atorvastatin - Ingredient(Reaction:Myalgia, Severity:Severe)  2.pravastatin - Ingredient(Reaction:Myalgia, Severity:Severe)  3.rosuvastatin - Ingredient(Reaction:myalgia, Severity:Severe)  Medications (Info obtained by: Verbal)  1.amLODIPine 10 mg tablet, Take 1 tablet orally once a day.  2.carvediloL 25 mg tablet, Take one and a half tablet orally once a day.  3.cloNIDine HCL 0.1 mg tablet, Take 1 tablet orally once a  day.  4.ferrous gluconate 324 mg (38 mg iron) tablet, Take 1 tablet orally once a day.  5.hydrALAZINE 100 mg tablet, Take 1 tablet orally 3 times a day.  6.levocetirizine 5 mg tablet, Take 1 tablet orally once a day.  7.levoFLOXacin 500 mg tablet, Take 1 tablet orally once a day.  8.metroNIDAZOLE 500 mg tablet, Take 1 tablet orally 2 times a day.  9.mirtazapine 7.5 mg tablet, Take 1 tablet orally once a day.  10.pantoprazole 40 mg tablet,delayed release, Take 1 tablet orally once a day.  11.Plavix 75 mg tablet, Take 1 tablet orally once a day.  12.Vitamin B-1 100 mg tablet, Take 1 tablet orally once a day.  13.Zetia 10 mg tablet, Take 1 tablet orally once a day.  Impression  1.Type 1 dissection of ascending aorta  2.H/O ascending aorta repair  3.H/O tracheostomy  4.Abnormal EKG  5.Benign essential HTN  6.Hypercholesteremia  7.Statin intolerance  Assessment & Plan  1. Chest pain and abdominal pain ,troponin negative x 2 in ER, EKG non ischemic.  Currently chest pain-free, patient believes this was secondary to constipation    - CCTA calcium score total score 69 (LM 0, LAD, 20, Circ 0, RCA 49) on 4/5/2025    - Nuclear stress test 4/23/24 normal     - echo with normal LVEF, no RWMA    2.  Hypertension, primary concern for today's visit    - Blood pressure readings were slightly elevated in the 140s and 150s yesterday, which concerned her    - Patient is enrolled in our RPM program and has an average blood pressure of 123/70.    - She was taken off of beta-blockers during hospitalization secondary to nocturnal complete AVB    - She is currently taking amlodipine 10 mg daily, clonidine 0.1 mg p.o. daily, hydralazine 100 mg p.o. 3 times daily.    - She is not on ACE/ARB, MRA due to kidney disease     3. 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 Type A dissection with surgical repair, trach, PEG Nov 2024.  5. Type B dissection 3/30/25, stent grafting.  6. S/p  excision of groin abscess on 5/5/25.  May need further debridement.    - follows with Dr. Holder    - on oral antibitoics  7. HTN  8. TANYA, on CPAP  8. CRI    Increased BMI: Provide patient with information regarding diet and lifestyle changes.  Labs and Diagnostics ordered  1.EKG (electrocardiogram) (Today)  Future appointments  1.Referral Visit - Bridger Juan Alberto ([email or web address]) : (Today)  Miscellaneous  1.Reviewed records from previous hospital visit  2.Reviewed lc apolipoprotein b, cholesterol, hdl chol, triglycerides, ldl cholestrol, vldl, non hdl chol and fasting patient lipid answer with the patient.  3.Reviewed trans thoracic echocardiogram with the patient.  4.Weight monitoring (regime/therapy)  Nurses documentation  Upcoming surgeries: None  Assistive devices: CPAP- complaint    Refills: None  EKG: Yes  (MS, CMA)   Patient instructions  Plan:  1. Follow up with Dr. Mcarthur nephrology for possible RAAS, MRA use for HTN., She has an appointment in July  2 Obtain 7 day MCT today and mail back in  3. Continue to not take beta-blocker. We could always increase Clonidine to 0.1 mg po BID if needed.   4. Continue RPM management  5. Follow up Dr. Montgomery in July as planned. Transportation is an issue for her  Lab Details  APOLIPOPROTEIN B  03/25/2025 02:07:00 PM  LC APOLIPOPROTEIN B 90 <90 mg/dL H F  LIPID PANEL  03/21/2025 08:57:44 PM  CHOLESTEROL 159 <200 mg/dL  F  HDL CHOL 36 40-59 mg/dL L F  TRIGLYCERIDES 113  mg/dL  F  LDL CHOLESTROL 102 <100 mg/dL H F  VLDL 19 0-30 mg/dL  F  NON HDL CHOL 123 <130 mg/dL  F  FASTING PATIENT LIPID ANSWER Yes   F  Diagnostics Details  Trans Thoracic Echocardiogram 02/11/2025  1.The left ventricle is normal in size. Mild left ventricular hypertrophy is noted. Global left ventricular systolic function is normal. The left ventricular ejection fraction is 55-60%. No significant wall motion abnormalities noted. Left ventricular diastolic function is normal.    2.The right  ventricle is normal in size. Right ventricular systolic function is mildly decreased.    3.The left atrium is mildly enlarged based on the left atrium volume index of 37.1ml/m².    Exercise Myocardial Perfusion Imaging 04/23/2024  1.Stress EKG is normal.    2.Maximal exercise treadmill test (MPHR : 87 %).    3.The functional capacity is fair (6.6 METs).    4.No chest discomfort.    5.Rare PVCs.    1.This is a normal perfusion study, no perfusion defects noted.    2.The left ventricular cavity is noted to be normal on the stress study. The left ventricular ejection fraction was calculated to be 62% and left ventricular global function is normal.    3.This scan is suggestive of low risk for future cardiovascular events.    4.The study quality is average.    CPOE Orders carried out by: Yany Paul  Care Providers: Reba MURPHY and Yany Paul  Electronically Authenticated by  Reba MURPHY  05/28/2025 02:14:39 PM  Disclaimer: Components of this note were documented using voice recognition system and are subject to errors not corrected at proofreading. Contact the author of this note for any clarifications.

## 2025-06-17 NOTE — PROGRESS NOTES
NURSING ADMISSION NOTE      Patient admitted via Cart  Oriented to room.  Safety precautions initiated.  Bed in low position.  Call light in reach.    Admission navigator completed with patient by this RN. A/Ox4, on room air, NSR on telemetry. Ambulates with cane, voids. No complaints of pain at this time. Lovenox for VTE, pt refusing SCDs. Cardiac diet. Plan for cardiac echo in AM. PIV saline locked. Bilateral groin wounds, documented in chart. Patient updated with plan of care. All needs met at this time.

## 2025-06-17 NOTE — CONSULTS
Cardiology Consultation    Alisha Wilde Patient Status:  Inpatient    10/25/1965 MRN [id number]   [facility] 3NE-A Attending Chano Lau*   Hosp Day # 0 PCP Bridger Avendano MD     Reason for Consultation:  chest pain      History of Present Illness:  Ailsha Wilde is a a(n) 59 year old female.  Very nice woman presented 2024 with Type A aortic dissection with emergent repair.  Quite ill with trach and PEG, ultimately recovered nicely.  Then presented 3/30/25 with type B dissection that required urgent stent grafting.  During that hospitalization, pauses were noted and beta blocker discontinued.  She was to have a TTM on dc.  She was admitted 25 for surgical excision of a groin abscess.  She had some nocturnal AVB, pauses up to 3 seconds while hospitalized.  Metoprolol stopped.  She presented last night with chest pressure.  This has been going on intermittently since her initial surgery.  She became more anxious and came to the ER.  Trop's negative, EKG non ischemic.  CTA chest/abd stable.      History:  Past Medical History[1]  Past Surgical History[2]  Family History[3]      Allergies:  Allergies[4]    Medications:  Scheduled Medications[5]    Continuous Infusions:  Medication Infusions[6]    Social History:   reports that she quit smoking about 26 years ago. Her smoking use included cigarettes. She started smoking about 39 years ago. She has a 13 pack-year smoking history. She has quit using smokeless tobacco. She reports that she does not currently use alcohol after a past usage of about 1.0 - 2.0 standard drink of alcohol per week. She reports that she does not use drugs.    Review of Systems:  All systems were reviewed and are negative except as described above in HPI.    Physical Exam:      Temp:  [98.1 °F (36.7 °C)-98.6 °F (37 °C)] 98.1 °F (36.7 °C)  Pulse:  [72-94] 72  Resp:  [15-26] 15  BP: (118-147)/(71-84) 135/73  SpO2:  [96 %-100 %] 96 %    Last 3 Weights   25  0017 198 lb 14.4 oz (90.2 kg)   06/16/25 1806 210 lb (95.3 kg)   05/26/25 2155 210 lb (95.3 kg)   05/19/25 1425 210 lb (95.3 kg)           General: No apparent distress  HEENT: No focal deficits.  Neck: supple. JVP normal  Cardiac: Regular rhythm, S1, S2 normal,   No rub or gallop.  No murmur.  Lungs: CTA  Abdomen: Soft, non-tender.   Extremities: No edema  Neurologic: no focal deficits  Skin: Warm and dry.          Telemetry: sinus    Laboratories and Data:  Diagnostics:    EKG, 6/17/2025:  reviewed    CXR, 6/17/2025:  none    Labs:   HEM:  Recent Labs   Lab 06/16/25 1814 06/17/25  0552   WBC 6.0 4.2   HGB 10.7* 9.4*   .0 226.0       Chem:  Recent Labs   Lab 06/16/25 1814 06/17/25  0552    140   K 2.7* 3.6    107   CO2 22.0 23.0   BUN 11 9   CREATSERUM 1.36* 1.26*   CA 9.8 9.4   MG 2.3 2.3   GLU 97 84       Recent Labs   Lab 06/16/25 1814 06/17/25  0552   ALT <7* <7*   AST 12 11   ALB 4.8 4.1       No results for input(s): \"PTP\", \"INR\" in the last 168 hours.    No results for input(s): \"TROP\", \"CK\" in the last 168 hours.      Impression:   Chest pressure - sx's off and on since initial surgery, reports she was more \"anxious\" yesterday.  CTA stable, trop's negative.  Type A aortic dissection, surgical repair, trach, PEG Nov 2024.  Recovered wonderfully.  Type B dissection with stent grafting March 30,2025.  HTN  TANYA  CRI, stable.    Plan:  Long talk with her, nice lady with good reasons to be anxious.  Let's see how she feels today and if doing well, plan home later on same meds.    Angelo Mosher MD  6/17/2025  6:57 AM  C5           [1]   Past Medical History:   Arthritis    Chronic kidney disease (CKD)    Esophageal reflux    High blood pressure    High cholesterol    Hyperlipidemia    HYPERTENSION    Hypertension    Prediabetes    Unspecified essential hypertension   [2]   Past Surgical History:  Procedure Laterality Date    Anesth,open heart surgery  11/23/2024    Colonoscopy N/A  2018    Procedure: COLONOSCOPY;  Surgeon: Magdy Webber MD;  [facility]: [facility] ENDOSCOPY    Colonoscopy  2018    Endometrial ablation      child passed away for 5 mins          1    Other surgical history  2011    cysto-Dr. Lacey -- pt denies   [3]   Family History  Problem Relation Age of Onset    Hypertension Mother     Heart Disorder Mother 70    Other (Other) Mother         kidney failure    Hypertension Father     Hypertension Maternal Grandfather     Cancer Neg     Diabetes Neg     Glaucoma Neg     Macular degeneration Neg    [4]   Allergies  Allergen Reactions    Atorvastatin MYALGIA    Pravastatin MYALGIA    Rosuvastatin MYALGIA    Seasonal Runny nose   [5]    carvedilol  25 mg Oral BID with meals    furosemide  40 mg Oral Daily    amLODIPine  10 mg Oral Daily    aspirin  81 mg Oral Daily    cloNIDine  0.05 mg Oral Daily    clopidogrel  75 mg Oral Daily    ezetimibe  10 mg Oral Daily    ferrous sulfate  325 mg Oral Daily with breakfast    hydrALAZINE  100 mg Oral TID    hydroCHLOROthiazide  12.5 mg Oral Daily    mirtazapine  7.5 mg Oral Nightly    pantoprazole  40 mg Oral QAM AC    enoxaparin  40 mg Subcutaneous Daily   [6]

## 2025-06-17 NOTE — PAYOR COMM NOTE
--------------  ADMISSION REVIEW     Payor: [employer]/HMO/POS/EPO  Subscriber #:  [id number]  Authorization Number: [id number]    Admit date: 6/17/25  Admit time: 12:14 AM      6/16:  Patient seen in ER    ED MD note:   History     Chief Complaint   Patient presents with    Chest Pain Angina       HPI    59 year old female with history of ascending aortic dissection s/p repair, hypertension, diabetes presents with chest pain.  Patient states since her surgery in March she has had intermittent episodes of central chest pain radiating towards her back, worse in certain positions especially supine feels better when she sits in recliner.  Patient is at her baseline normal exercise tolerance patient denies any associated dyspnea, fevers, cough, vomiting.  Normal sensation and strength in extremities.  She is not on anticoagulation.  She has been compliant with her blood pressure medications.      Physical Exam     ED Triage Vitals [06/16/25 1806]   /75   Pulse 94   Resp 18   Temp 98.6 °F (37 °C)   Temp src Temporal   SpO2 100 %   O2 Device None (Room air)       Physical Exam  Constitutional:       General: She is in acute distress.   Eyes:      Extraocular Movements: Extraocular movements intact.   Cardiovascular:      Rate and Rhythm: Normal rate and regular rhythm.      Pulses: Normal pulses.   Pulmonary:      Effort: Pulmonary effort is normal. No respiratory distress.      Breath sounds: Normal breath sounds.   Abdominal:      General: Abdomen is flat. There is no distension.      Tenderness: There is no abdominal tenderness. There is no guarding.   Musculoskeletal:      Cervical back: Normal range of motion.   Neurological:      General: No focal deficit present.      Mental Status: She is alert.          ED Course     Labs Reviewed   COMP METABOLIC PANEL (14) - Abnormal; Notable for the following components:       Result Value    Potassium 2.7 (*)     Creatinine 1.36 (*)     eGFR-Cr 44 (*)      ALT <7 (*)     All other components within normal limits   CBC WITH DIFFERENTIAL WITH PLATELET - Abnormal; Notable for the following components:    HGB 10.7 (*)     HCT 33.8 (*)     MCV 77.2 (*)     MCH 24.4 (*)     All other components within normal limits   TROPONIN I HIGH SENSITIVITY - Normal   MAGNESIUM - Normal   RAINBOW DRAW LAVENDER   RAINBOW DRAW LIGHT GREEN   RAINBOW DRAW BLUE   RAINBOW DRAW GOLD     CTA CHEST+CTA ABDOMEN DISSECT SET (CPT=71275/72146)  Result Date: 6/16/2025  CONCLUSION:  1. There has been interval recurrent stenting of the thoracic aorta with resolution of the previously noted endoleak.  The overall diameter of the posterior aspect of the aortic arch has increased currently measuring 43 mm in diameter, previously measuring 38 mm. 2. Stable dissection flap involving the left subclavian artery proximally. 3. Interval resolution of bilateral pleural effusions, decreasing atelectasis and/or scarring within the left lower lobe. 4. No acute process within the chest, abdomen or pelvis identified.  Please see further, less significant details above.       MDM   Chest pain radiating to the back.  Not classically anginal in nature.  Potential pleurisy, pericarditis, given her prior dissection, will proceed with CTA to assess for endoleak.  Vitals are reassuring, she is mentating perfusing appropriately.    ED Course as of 06/16/25 2336  ------------------------------------------------------------  Time: 06/16 1942  Comment: EKG interpretation by me: EKG sinus rhythm at a rate of 109, axis nomal, no concerning acute ischemic ST changes  ------------------------------------------------------------  Time: 06/16 1943  Value: Potassium(!!): 2.7  Comment: repleted  ------------------------------------------------------------  Time: 06/16 2233  Value: CTA CHEST+CTA ABDOMEN DISSECT SET (CPT=71275/65160)  Comment: (Reviewed)  ------------------------------------------------------------  Time: 06/16  1333  Comment: My interpretation of CT with postoperative changes.  Radiologist noting interval recurrent stenting of the thoracic aorta with resolution of the previously noted endoleak.  The overall diameter of the posterior aspect of the aortic arch has increased currently measuring 43 mm in diameter, previously   measuring 38 mm     ------------------------------------------------------------  Time: 06/16 1911  Comment: On reassessment patient is feeling much more comfortable.  Remains hemodynamically stable.  ------------------------------------------------------------  Time: 06/16 8951  Comment: I consulted with vascular surgery Dr. Potter, I discussed image findings with enlargement of aortic arch.  Recommending admission and consultation with [facility] for further workup.         Disposition and Plan     Clinical Impression:  1. Chest pain of uncertain etiology    2. Status post aortic dissection repair        Disposition:  Admit        Cardiology Consult:      Alisha Wilde is a 59 yr old F  who presented with chest pain.  Since her surgery in march she has had intermittent episodes   and has a PMH/PSH of: h/o Type I ascending aortic dissection s/p repair, HTN diabetes HL TANYA increased BMI      Component Value Date     WBC 6.0 06/16/2025     HGB 10.7 06/16/2025     HCT 33.8 06/16/2025     .0 06/16/2025     CREATSERUM 1.36 06/16/2025     BUN 11 06/16/2025      06/16/2025     K 2.7 06/16/2025      06/16/2025     CO2 22.0 06/16/2025     GLU 97 06/16/2025     CA 9.8 06/16/2025     ALB 4.8 06/16/2025     ALKPHO 63 06/16/2025     BILT 0.3 06/16/2025     TP 7.9 06/16/2025     AST 12 06/16/2025     ALT <7 06/16/2025     MG 2.3 06/16/2025     TROPHS 18 06/16/2025      Assessment:   EKG shows sr w/o acute changes  CT chest shows 1. There has been interval recurrent stenting of the thoracic aorta with resolution of the previously noted endoleak.  The overall diameter of the posterior aspect  of the aortic arch has increased currently measuring 43 mm in diameter, previously measuring 38 mm. 2. Stable dissection flap involving the left subclavian artery proximally. 3. Interval resolution of bilateral pleural effusions, decreasing atelectasis and/or scarring within the left lower lobe. 4. No acute process within the chest, abdomen or pelvis identified.  Trop 18   K 2.7 creat 1.36  WBC 6 HH 10.7/33.8 plt 241  /73 HR 75 RR 16 O2 sats 100% RA  Med in ED  MS 4 mg IV, Zofran 4mg IV and klorcon 80 meq po   Currently asymptomatic         Plan:  Trend trops obtain EKG if indicated  Echo in am   Cbc cmp mag in am   Orders in signed and held   - Continue to monitor overnight  - Formal Cardiology consult to follow in AM.         6/17:     History and Physical      Chief Complaint: chest pain     History of Present Illness:      Alisha Wilde is a 59 year old female with hypertension, hyperlipidemia, recent aortic ascending  dissection status post repair back in March of this year presents emergency room with chest pain located in the center of the chest rating towards her back.  Patient states it worsens when she changes positions especially if she lays down.  The pain does improve if she sits in a recliner.  No diaphoresis, nausea, vomiting.  No fevers, chills.  No shortness of breath or palpitations.    Recent Labs   Lab 06/16/25  1814   GLU 97   BUN 11   CREATSERUM 1.36*   EGFRCR 44*   CA 9.8   ALB 4.8      K 2.7*      CO2 22.0   ALKPHO 63   AST 12   ALT <7*   BILT 0.3   TP 7.9     Lab 06/16/25  1814   RBC 4.38   HGB 10.7*   HCT 33.8*   MCV 77.2*   MCH 24.4*   MCHC 31.7   RDW 17.4   NEPRELIM 3.89   WBC 6.0   .0         Recent Labs   Lab 06/16/25  1814   TROPHS 18      Assessment & Plan:  # Chest pain  - troponin negative  - EKG shows NSR  - recent aortic dissection repair with repeat imaging showing increased diameter of posterior arch now at 43mm from 38mm  - cardiology on consult  - Echo  in the am     # Hypertension  - Will continue on carvedilol, clonidine, amlodipine, hydralazine, hydrochlorothiazide.     # Hyperlipidemia  - Will continue on ezetimibe     # CKD stage 3  - Currently at baseline.        Cardiology Consult:   Reason for Consultation:  chest pain        History of Present Illness:  Alisha Wilde is a a(n) 59 year old female.  Very nice woman presented nov 2024 with Type A aortic dissection with emergent repair.  Quite ill with trach and PEG, ultimately recovered nicely.  Then presented 3/30/25 with type B dissection that required urgent stent grafting.  During that hospitalization, pauses were noted and beta blocker discontinued.  She was to have a TTM on dc.  She was admitted 5/5/25 for surgical excision of a groin abscess.  She had some nocturnal AVB, pauses up to 3 seconds while hospitalized.  Metoprolol stopped.  She presented last night with chest pressure.  This has been going on intermittently since her initial surgery.  She became more anxious and came to the ER.  Trop's negative, EKG non ischemic.  CTA chest/abd stable.    Recent Labs   Lab 06/16/25  1814 06/17/25  0552   WBC 6.0 4.2   HGB 10.7* 9.4*   .0 226.0      Lab 06/16/25  1814 06/17/25  0552    140   K 2.7* 3.6    107   CO2 22.0 23.0   BUN 11 9   CREATSERUM 1.36* 1.26*   CA 9.8 9.4   MG 2.3 2.3   GLU 97 84      Lab 06/16/25  1814 06/17/25  0552   ALT <7* <7*   AST 12 11   ALB 4.8 4.1       Impression:   Chest pressure - sx's off and on since initial surgery, reports she was more \"anxious\" yesterday.  CTA stable, trop's negative.  Type A aortic dissection, surgical repair, trach, PEG Nov 2024.  Recovered wonderfully.  Type B dissection with stent grafting March 30,2025.  HTN  TANYA  CRI, stable.        Vascular Surgery:     Notified of admission  I reviewed films - stent repair in good position  Will evaluate later today        MEDICATIONS ADMINISTERED IN LAST 1 DAY:  iopamidol 76% (ISOVUE-370)  injection for power injector       Date Action Dose Route User    6/16/2025 2136 Given 100 mL Intravenous (Right Antecubital) Panchito Stubbs          mirtazapine (Remeron) tab 7.5 mg       Date Action Dose Route User    6/17/2025 0105 Given 7.5 mg Oral Prisca Gant RN          morphINE PF 4 MG/ML injection 4 mg       Date Action Dose Route User    6/16/2025 1958 Given 4 mg Intravenous Rose Navarro RN          ondansetron (Zofran) 4 MG/2ML injection 4 mg       Date Action Dose Route User    6/16/2025 2013 Given 4 mg Intravenous Rose Navarro RN          pantoprazole (Protonix) DR tab 40 mg       Date Action Dose Route User    6/17/2025 0530 Given 40 mg Oral Prisca Gant RN          potassium chloride (Klor-Con M20) tab 80 mEq       Date Action Dose Route User    6/16/2025 1957 Given 80 mEq Oral Rose Navarro RN            Vitals (last day)       Date/Time Temp Pulse Resp BP SpO2 Weight O2 Device O2 Flow Rate (L/min) [facility]    06/17/25 0752 -- 72 26 -- 96 % -- -- -- AL    06/17/25 0751 97.6 °F (36.4 °C) 71 14 130/76 96 % -- None (Room air) 0 L/min AL    06/17/25 0750 -- 74 25 -- 96 % -- -- -- AL    06/17/25 0749 -- 73 16 -- 94 % -- -- -- AL    06/17/25 0748 -- 71 19 -- 95 % -- -- -- AL    06/17/25 0747 -- 73 26 -- 96 % -- -- -- AL    06/17/25 0746 -- 72 15 -- 95 % -- -- -- AL    06/17/25 0415 98.1 °F (36.7 °C) 72 15 135/73 96 % -- None (Room air) -- AB    06/17/25 0017 -- -- -- -- -- 198 lb 14.4 oz (90.2 kg) -- -- JH    06/17/25 0017 98.2 °F (36.8 °C) 73 22 135/71 98 % -- None (Room air) -- AB    06/17/25 0000 -- 79 17 130/72 100 % -- None (Room air) -- KJ    06/16/25 2330 -- 75 16 139/73 100 % -- None (Room air) -- KJ    06/16/25 2300 -- 75 22 130/74 100 % -- None (Room air) -- KJ    06/16/25 2230 -- 73 19 129/74 100 % -- None (Room air) -- KJ    06/16/25 2200 -- 75 16 134/75 100 % -- None (Room air) -- KJ    06/16/25 2100 -- 79 18 123/74 100 % -- None (Room air) -- KJ    06/16/25 2030 -- 74 22 129/84 100 %  -- None (Room air) -- KJ    06/16/25 2015 -- 72 26 143/78 100 % -- None (Room air) -- KJ    06/16/25 1930 -- 87 17 147/81 100 % -- None (Room air) -- KJ    06/16/25 1806 98.6 °F (37 °C) 94 18 118/75 100 % 210 lb (95.3 kg) None (Room air) -- EM

## 2025-06-17 NOTE — CM/SW NOTE
SW received order for Home Health Services. Chart reviewed, pt has hx with [facility] and [facility].     SW sent referral to [facility] in [facility] and received message from Murali stating pt is current with the agency for RN and PT.   DONIS order entered and attached to referral in [place].     SW/CM to remain available for support and/or discharge planning.    LETICIA Lujan  Discharge Planner

## 2025-06-17 NOTE — PLAN OF CARE
Assumed care at 0730  A/Ox4, RA, VSS  Tele, NSR  Denies n/v & pain   PIV R FA, SL  K replaced  Cardiac diet  Safety measures in place  All needs met at this time    Problem: SAFETY ADULT - FALL  Goal: Free from fall injury  Description: INTERVENTIONS:  - Assess pt frequently for physical needs  - Identify cognitive and physical deficits and behaviors that affect risk of falls.  - [place] fall precautions as indicated by assessment.  - Educate pt/family on patient safety including physical limitations  - Instruct pt to call for assistance with activity based on assessment  - Modify environment to reduce risk of injury  - Provide assistive devices as appropriate  - Consider OT/PT consult to assist with strengthening/mobility  - Encourage toileting schedule  Outcome: Progressing     Problem: PAIN - ADULT  Goal: Verbalizes/displays adequate comfort level or patient's stated pain goal  Description: INTERVENTIONS:  - Encourage pt to monitor pain and request assistance  - Assess pain using appropriate pain scale  - Administer analgesics based on type and severity of pain and evaluate response  - Implement non-pharmacological measures as appropriate and evaluate response  - Consider cultural and social influences on pain and pain management  - Manage/alleviate anxiety  - Utilize distraction and/or relaxation techniques  - Monitor for opioid side effects  - Notify MD/LIP if interventions unsuccessful or patient reports new pain  - Anticipate increased pain with activity and pre-medicate as appropriate  Outcome: Progressing     Problem: Patient/Family Goals  Goal: Patient/Family Long Term Goal  Description: Patient's Long Term Goal: discharge    Interventions:  - follow plan of care  - See additional Care Plan goals for specific interventions  Outcome: Progressing  Goal: Patient/Family Short Term Goal  Description: Patient's Short Term Goal: Resolve chest pain    Interventions:   - consults   - ECHO  - See additional Care  Plan goals for specific interventions  Outcome: Progressing

## 2025-06-17 NOTE — H&P
[facility]     Alisha Wilde Patient Status:  Emergency    10/25/1965 MRN [id number]   Location [facility] EMERGENCY DEPARTMENT Attending Manan Richardson MD   Hosp Day # 0 PCP Bridger Avendano MD     Chief Complaint: chest pain    Subjective:    History of Present Illness:     Alisha Wilde is a 59 year old female with hypertension, hyperlipidemia, recent aortic ascending  dissection status post repair back in March of this year presents emergency room with chest pain located in the center of the chest rating towards her back.  Patient states it worsens when she changes positions especially if she lays down.  The pain does improve if she sits in a recliner.  No diaphoresis, nausea, vomiting.  No fevers, chills.  No shortness of breath or palpitations.    History/Other:    Past Medical History:  Past Medical History[1]  Past Surgical History:   Past Surgical History[2]   Family History:   Family History[3]  Social History:    reports that she quit smoking about 26 years ago. Her smoking use included cigarettes. She started smoking about 39 years ago. She has a 13 pack-year smoking history. She has quit using smokeless tobacco. She reports that she does not currently use alcohol after a past usage of about 1.0 - 2.0 standard drink of alcohol per week. She reports that she does not use drugs.     Allergies: Allergies[4]    Medications:  Medications Ordered Prior to Encounter[5]    Review of Systems:   A comprehensive review of systems was completed.    Pertinent positives and negatives noted in the HPI.    Objective:   Physical Exam:    /72   Pulse 79   Temp 98.6 °F (37 °C) (Temporal)   Resp 17   Ht 5' 5\" (1.651 m)   Wt 210 lb (95.3 kg)   SpO2 100%   BMI 34.95 kg/m²   General: No acute distress, Alert  Respiratory: No rhonchi, no wheezes  Cardiovascular: S1, S2. Regular rate and rhythm  Abdomen: Soft, Non-tender, non-distended, positive bowel sounds  Neuro: No new focal  deficits  Extremities: No edema      Results:    Labs:      Labs Last 24 Hours:    Recent Labs   Lab 06/16/25  1814   RBC 4.38   HGB 10.7*   HCT 33.8*   MCV 77.2*   MCH 24.4*   MCHC 31.7   RDW 17.4   NEPRELIM 3.89   WBC 6.0   .0       Recent Labs   Lab 06/16/25  1814   GLU 97   BUN 11   CREATSERUM 1.36*   EGFRCR 44*   CA 9.8   ALB 4.8      K 2.7*      CO2 22.0   ALKPHO 63   AST 12   ALT <7*   BILT 0.3   TP 7.9       Estimated Glomerular Filtration Rate: 44 mL/min/1.73m2 (A) (result from lab).    Lab Results   Component Value Date    INR 1.06 05/26/2025    INR 1.19 04/07/2025    INR 1.18 04/03/2025       Recent Labs   Lab 06/16/25  1814   TROPHS 18       No results for input(s): \"TROP\", \"PBNP\" in the last 168 hours.    No results for input(s): \"PCT\" in the last 168 hours.    Imaging: Imaging data reviewed in Epic.    Assessment & Plan:      # Chest pain  - troponin negative  - EKG shows NSR  - recent aortic dissection repair with repeat imaging showing increased diameter of posterior arch now at 43mm from 38mm  - cardiology on consult  - Echo in the am    # Hypertension  - Will continue on carvedilol, clonidine, amlodipine, hydralazine, hydrochlorothiazide.    # Hyperlipidemia  - Will continue on ezetimibe    # CKD stage 3  - Currently at baseline.        Plan of care discussed with patient at bedside    Chano Lau, DO    Supplementary Documentation:     The 21st Century Cures Act makes medical notes like these available to patients in the interest of transparency. Please be advised this is a medical document. Medical documents are intended to carry relevant information, facts as evident, and the clinical opinion of the practitioner. The medical note is intended as peer to peer communication and may appear blunt or direct. It is written in medical language and may contain abbreviations or verbiage that are unfamiliar.                                     [1]   Past Medical History:    Arthritis    Chronic kidney disease (CKD)    Esophageal reflux    High blood pressure    High cholesterol    Hyperlipidemia    HYPERTENSION    Hypertension    Unspecified essential hypertension   [2]   Past Surgical History:  Procedure Laterality Date    Anesth,open heart surgery  2024    Colonoscopy N/A 2018    Procedure: COLONOSCOPY;  Surgeon: Magdy Webber MD;  Location: [facility] ENDOSCOPY    Colonoscopy  2018    Endometrial ablation      child passed away for 5 mins          1    Other surgical history  2011    cysto-Dr. Lacey -- pt denies   [3]   Family History  Problem Relation Age of Onset    Hypertension Mother     Heart Disorder Mother 70    Other (Other) Mother         kidney failure    Hypertension Father     Hypertension Maternal Grandfather     Cancer Neg     Diabetes Neg     Glaucoma Neg     Macular degeneration Neg    [4]   Allergies  Allergen Reactions    Atorvastatin MYALGIA    Pravastatin MYALGIA    Rosuvastatin MYALGIA    Seasonal Runny nose   [5]   Current Facility-Administered Medications on File Prior to Encounter   Medication Dose Route Frequency Provider Last Rate Last Admin    [COMPLETED] potassium chloride (Klor-Con M20) tab 40 mEq  40 mEq Oral Once Halima Otero DO   40 mEq at 25 2339    [COMPLETED] ceFAZolin (Ancef) 2g in 10mL IV syringe premix  2 g Intravenous Q8H Frank Holder  mL/hr at 25 2303 2 g at 25 2303    [COMPLETED] hydrALAZINE (Apresoline) tab 25 mg  25 mg Oral Once Carson Curran APRN   25 mg at 25 1125    [COMPLETED] hydrALAzine (Apresoline) 20 mg/mL injection 10 mg  10 mg Intravenous Once Vikki Kwan APRN   10 mg at 25    [COMPLETED] potassium chloride (Klor-Con M20) tab 40 mEq  40 mEq Oral Once Danette Atwood DO   40 mEq at 04/10/25 0829    [COMPLETED] potassium phosphate dibasic 15 mmol in sodium chloride 0.9% 250 mL IVPB  15 mmol Intravenous Once Bunny Atwood MD 62.5 mL/hr  at 25 0641 15 mmol at 25 0641    Followed by    [COMPLETED] potassium chloride 20 mEq/100mL IVPB premix 20 mEq  20 mEq Intravenous Once Bunny Atwood MD 50 mL/hr at 25 1009 20 mEq at 25 1009    [COMPLETED] piperacillin-tazobactam (Zosyn) 3.375 g in dextrose 5% 100 mL IVPB-ADDV  3.375 g Intravenous Q8H Frank Holder MD 25 mL/hr at 25 1808 3.375 g at 25 1808    [COMPLETED] potassium chloride 20 mEq/100mL IVPB premix 20 mEq  20 mEq Intravenous Once Jade Cameron MD 50 mL/hr at 25 1042 20 mEq at 25 1042    [COMPLETED] sodium chloride 0.9% infusion   Intravenous Once Woodrow Boswell MD 10 mL/hr at 25 1606 New Bag at 25 1606    [] ondansetron (Zofran) 4 MG/2ML injection 4 mg  4 mg Intravenous PRN Anshul Olvera MD        [] metoclopramide (Reglan) 5 mg/mL injection 5 mg  5 mg Intravenous PRN Anshul Olvera MD        [] dexamethasone (Decadron) 4 MG/ML injection 4 mg  4 mg Intravenous PRN Anshul Olvera MD        [] trimethobenzamide (Tigan) 100 mg/mL injection 200 mg  200 mg Intramuscular PRN Anshul Olvera MD        [] HYDROmorphone (Dilaudid) 1 MG/ML injection 0.4 mg  0.4 mg Intravenous Q5 Min PRN Anshul Olvera MD        [] HYDROcodone-acetaminophen (Norco) 5-325 MG per tab 1 tablet  1 tablet Oral Once PRN Anshul Olvera MD        Or    [] HYDROcodone-acetaminophen (Norco) 5-325 MG per tab 2 tablet  2 tablet Oral Once PRAnshul Beauchamp MD        [] atropine 0.1 MG/ML injection 0.5 mg  0.5 mg Intravenous PRN Anshul Olvera MD        [] naloxone (Narcan) 0.4 MG/ML injection 0.08 mg  0.08 mg Intravenous PRN Anshul Olvera MD        [] diphenhydrAMINE (Benadryl) 50 mg/mL  injection 12.5 mg  12.5 mg Intravenous PRN Anshul Olvera MD        [COMPLETED] potassium phosphate dibasic 15 mmol in  sodium chloride 0.9% 250 mL IVPB  15 mmol Intravenous Once Stefany Mccall MD 62.5 mL/hr at 259 15 mmol at 25    [COMPLETED] potassium chloride 40 mEq in 250mL sodium chloride 0.9% IVPB premix  40 mEq Intravenous Once Tia Parker MD 62.5 mL/hr at 25 0507 40 mEq at 25 0507    [COMPLETED] potassium chloride 40 mEq in 250mL sodium chloride 0.9% IVPB premix  40 mEq Intravenous Once Jade Cameron MD 62.5 mL/hr at 25 1004 40 mEq at 25 1004    [COMPLETED] iopamidol 76% (ISOVUE-370) injection for power injector  100 mL Intravenous ONCE PRN Jade Cameron MD   100 mL at 25 1300    [COMPLETED] heparin (Porcine) 1000 UNIT/ML injection - BOLUS IV 5,000 Units  5,000 Units Intravenous Once Sb Campbell MD   5,000 Units at 25 1450    [COMPLETED] heparin (Porcine) 25135 units/250 mL infusion (ACS/AFIB) INITIAL DOSE  1,000 Units/hr Intravenous Once Sb Campbell MD 10 mL/hr at 25 1453 1,000 Units/hr at 25 1453    [COMPLETED] potassium chloride 20 mEq/100mL IVPB premix 20 mEq  20 mEq Intravenous Once Bunny Atwood MD 50 mL/hr at 25 0506 20 mEq at 25 0506    [COMPLETED] bisacodyl (Dulcolax) 10 MG rectal suppository 10 mg  10 mg Rectal Once Frank Holder MD   10 mg at 25 1103    [COMPLETED] furosemide (Lasix) 10 mg/mL injection 40 mg  40 mg Intravenous Once Bunny Atwood MD   40 mg at 25 0055    [COMPLETED] verapamil (Isoptin) 2.5 mg/mL injection             [COMPLETED] Nitroglycerin in D5W 200-5 MCG/ML-% injection             [] lactated ringers IV bolus 500 mL  500 mL Intravenous Once PRN Tanner Coffey MD        [] atropine 0.1 MG/ML injection 0.5 mg  0.5 mg Intravenous PRN Tanner Coffey MD        [] naloxone (Narcan) 0.4 MG/ML injection 0.08 mg  0.08 mg Intravenous PRN Tanner Coffey MD        [] fentaNYL (Sublimaze) 50 mcg/mL injection 25 mcg  25  mcg Intravenous Q5 Min PRN Tanner Coffey MD   25 mcg at 25    Or    [] fentaNYL (Sublimaze) 50 mcg/mL injection 50 mcg  50 mcg Intravenous Q5 Min PRN Tanner Coffey MD   50 mcg at 25 2249    [] HYDROmorphone (Dilaudid) 1 MG/ML injection 0.2 mg  0.2 mg Intravenous Q5 Min PRN Tanner Coffey MD        Or    [] HYDROmorphone (Dilaudid) 1 MG/ML injection 0.4 mg  0.4 mg Intravenous Q5 Min PRN Tanner Coffey MD   0.4 mg at 25 0319    Or    [] HYDROmorphone (Dilaudid) 1 MG/ML injection 0.6 mg  0.6 mg Intravenous Q5 Min PRN Tanner Coffey MD        [COMPLETED] iodixanol (VISIPAQUE) 320 MG/ML injection 150 mL  150 mL Injection ONCE PRN Magdy Palm MD   250 mL at 25    [COMPLETED] ceFAZolin (Ancef) 2g in 10mL IV syringe premix  2 g Intravenous Q8H Magdy Palm  mL/hr at 25 1104 2 g at 25 1104    [COMPLETED] furosemide (Lasix) 10 mg/mL injection 40 mg  40 mg Intravenous Once Bunny Atwood MD   40 mg at 25 2136    [COMPLETED] iopamidol 76% (ISOVUE-370) injection for power injector  100 mL Intravenous ONCE PRN Sumit Kapoor MD   100 mL at 25 0924    [COMPLETED] iopamidol 76% (ISOVUE-370) injection for power injector  80 mL Intravenous ONCE PRN Matheus Hayes MD   80 mL at 25 0833    [] nitroGLYCERIN in dextrose 5% 50 mg/250mL infusion premix             [COMPLETED] lidocaine (Xylocaine) 1 % injection  10 mL Intradermal Once Woodrow Boswell MD   10 mL at 25 1559    [COMPLETED] potassium phosphate dibasic 15 mmol in sodium chloride 0.9% 250 mL IVPB  15 mmol Intravenous Once Woodrow Boswell MD 62.5 mL/hr at 25 1826 15 mmol at 25 1826    Followed by    [COMPLETED] potassium chloride 20 mEq/100mL IVPB premix 20 mEq  20 mEq Intravenous Once Woodrow Boswell MD 50 mL/hr at 25 0002 20 mEq at 25 0002     Current Outpatient Medications on File Prior to Encounter    Medication Sig Dispense Refill    mirtazapine 7.5 MG Oral Tab Take 1 tablet (7.5 mg total) by mouth nightly. 90 tablet 0    hydroCHLOROthiazide 12.5 MG Oral Tab Take 1 tablet (12.5 mg total) by mouth daily. 90 tablet 0    clopidogrel 75 MG Oral Tab Take 1 tablet (75 mg total) by mouth daily.      thiamine 100 MG Oral Tab TAKE 1 TABLET DAILY WITH FEEDING TUBE 90 tablet 1    cloNIDine 0.1 MG Oral Tab Take 0.5 tablets (0.05 mg total) by mouth daily.      hydrALAZINE 100 MG Oral Tab Take 1 tablet (100 mg total) by mouth in the morning, at noon, and at bedtime. 90 tablet 2    acetaminophen 500 MG Oral Tab Take 2 tablets (1,000 mg total) by mouth every 6 (six) hours as needed for Pain.      ezetimibe 10 MG Oral Tab Take 1 tablet (10 mg total) by mouth in the morning.      pantoprazole 40 MG Oral Tab EC Take 1 tablet (40 mg total) by mouth every morning before breakfast. 90 tablet 3    docusate sodium (COLACE) 100 MG Oral Cap Take 1 capsule (100 mg total) by mouth 2 (two) times daily as needed for constipation. 180 capsule 1    Ferrous Gluconate 324 (37.5 Fe) MG Oral Tab Take 1 tablet (324 mg total) by mouth daily. 90 tablet 1    amLODIPine 10 MG Oral Tab Take 1 tablet (10 mg total) by mouth daily. 90 tablet 3    [] gentamicin 0.1 % External Ointment Apply 1 Application topically 2 (two) times daily for 14 days. 30 g 0    carvedilol 25 MG Oral Tab       [] metroNIDAZOLE 500 MG Oral Tab Take 1 tablet (500 mg total) by mouth 2 (two) times daily for 7 days. 14 tablet 0    [] levoFLOXacin 500 MG Oral Tab Take 1 tablet (500 mg total) by mouth daily for 10 days. 10 tablet 0    furosemide 40 MG Oral Tab Take 1 tablet (40 mg total) by mouth daily.      levocetirizine 5 MG Oral Tab Take 1 tablet (5 mg total) by mouth every evening. 30 tablet 1    [] aspirin 81 MG Oral Tab EC Take 1 tablet (81 mg total) by mouth daily. 30 tablet 0    bisacodyl 5 MG Oral Tab EC Take 1 tablet (5 mg total) by mouth daily  as needed. 30 tablet 0    azelastine 0.1 % Nasal Solution 1 spray by Nasal route 2 (two) times daily. 3 each 1

## 2025-06-17 NOTE — PROGRESS NOTES
Notified of admission  I reviewed films - stent repair in good position  Will evaluate later today

## 2025-06-17 NOTE — PLAN OF CARE
Problem: Patient/Family Goals  Goal: Patient/Family Long Term Goal  Description: Patient's Long Term Goal: discharge    Interventions:  - follow plan of care  - See additional Care Plan goals for specific interventions  Outcome: Progressing  Goal: Patient/Family Short Term Goal  Description: Patient's Short Term Goal: Resolve chest pain    Interventions:   - consults   - ECHO  - See additional Care Plan goals for specific interventions  Outcome: Progressing     Problem: PAIN - ADULT  Goal: Verbalizes/displays adequate comfort level or patient's stated pain goal  Description: INTERVENTIONS:  - Encourage pt to monitor pain and request assistance  - Assess pain using appropriate pain scale  - Administer analgesics based on type and severity of pain and evaluate response  - Implement non-pharmacological measures as appropriate and evaluate response  - Consider cultural and social influences on pain and pain management  - Manage/alleviate anxiety  - Utilize distraction and/or relaxation techniques  - Monitor for opioid side effects  - Notify MD/LIP if interventions unsuccessful or patient reports new pain  - Anticipate increased pain with activity and pre-medicate as appropriate  Outcome: Progressing     Problem: SAFETY ADULT - FALL  Goal: Free from fall injury  Description: INTERVENTIONS:  - Assess pt frequently for physical needs  - Identify cognitive and physical deficits and behaviors that affect risk of falls.  - [place] fall precautions as indicated by assessment.  - Educate pt/family on patient safety including physical limitations  - Instruct pt to call for assistance with activity based on assessment  - Modify environment to reduce risk of injury  - Provide assistive devices as appropriate  - Consider OT/PT consult to assist with strengthening/mobility  - Encourage toileting schedule  Outcome: Progressing

## 2025-06-17 NOTE — ED PROVIDER NOTES
History     Chief Complaint   Patient presents with    Chest Pain Angina       HPI    59 year old female with history of ascending aortic dissection s/p repair, hypertension, diabetes presents with chest pain.  Patient states since her surgery in March she has had intermittent episodes of central chest pain radiating towards her back, worse in certain positions especially supine feels better when she sits in recliner.  Patient is at her baseline normal exercise tolerance patient denies any associated dyspnea, fevers, cough, vomiting.  Normal sensation and strength in extremities.  She is not on anticoagulation.  She has been compliant with her blood pressure medications.          Past Medical History[1]    Past Surgical History[2]    Social History     Socioeconomic History    Marital status: Single   Tobacco Use    Smoking status: Former     Current packs/day: 0.00     Average packs/day: 1 pack/day for 13.0 years (13.0 ttl pk-yrs)     Types: Cigarettes     Start date:      Quit date:      Years since quittin.4    Smokeless tobacco: Former   Vaping Use    Vaping status: Never Used   Substance and Sexual Activity    Alcohol use: Not Currently     Alcohol/week: 1.0 - 2.0 standard drink of alcohol     Types: 1 - 2 Cans of beer per week     Comment: every day  NOT DRINKING FOR LAS 4 MONTHS    Drug use: No     Social Drivers of Health     Food Insecurity: No Food Insecurity (2025)    NCSS - Food Insecurity     Worried About Running Out of Food in the Last Year: No     Ran Out of Food in the Last Year: No   Transportation Needs: No Transportation Needs (2025)    NCSS - Transportation     Lack of Transportation: No   Housing Stability: Not At Risk (2025)    NCSS - Housing/Utilities     Has Housing: Yes     Worried About Losing Housing: No     Unable to Get Utilities: No                   Physical Exam     ED Triage Vitals [25 1806]   /75   Pulse 94   Resp 18   Temp 98.6 °F (37 °C)    Temp src Temporal   SpO2 100 %   O2 Device None (Room air)       Physical Exam  Constitutional:       General: She is in acute distress.   Eyes:      Extraocular Movements: Extraocular movements intact.   Cardiovascular:      Rate and Rhythm: Normal rate and regular rhythm.      Pulses: Normal pulses.   Pulmonary:      Effort: Pulmonary effort is normal. No respiratory distress.      Breath sounds: Normal breath sounds.   Abdominal:      General: Abdomen is flat. There is no distension.      Tenderness: There is no abdominal tenderness. There is no guarding.   Musculoskeletal:      Cervical back: Normal range of motion.   Neurological:      General: No focal deficit present.      Mental Status: She is alert.              ED Course     Labs Reviewed   COMP METABOLIC PANEL (14) - Abnormal; Notable for the following components:       Result Value    Potassium 2.7 (*)     Creatinine 1.36 (*)     eGFR-Cr 44 (*)     ALT <7 (*)     All other components within normal limits   CBC WITH DIFFERENTIAL WITH PLATELET - Abnormal; Notable for the following components:    HGB 10.7 (*)     HCT 33.8 (*)     MCV 77.2 (*)     MCH 24.4 (*)     All other components within normal limits   TROPONIN I HIGH SENSITIVITY - Normal   MAGNESIUM - Normal   RAINBOW DRAW LAVENDER   RAINBOW DRAW LIGHT GREEN   RAINBOW DRAW BLUE   RAINBOW DRAW GOLD     CTA CHEST+CTA ABDOMEN DISSECT SET (CPT=71275/18699)  Result Date: 6/16/2025  CONCLUSION:  1. There has been interval recurrent stenting of the thoracic aorta with resolution of the previously noted endoleak.  The overall diameter of the posterior aspect of the aortic arch has increased currently measuring 43 mm in diameter, previously measuring 38 mm. 2. Stable dissection flap involving the left subclavian artery proximally. 3. Interval resolution of bilateral pleural effusions, decreasing atelectasis and/or scarring within the left lower lobe. 4. No acute process within the chest, abdomen or pelvis  identified.  Please see further, less significant details above.    LOCATION:  [facility]    Dictated by (CST): Larry Leblanc DO on 6/16/2025 at 10:17 PM     Finalized by (CST): Larry Leblanc DO on 6/16/2025 at 10:30 PM             MDM     Vitals:    06/16/25 2030 06/16/25 2100 06/16/25 2200 06/16/25 2230   BP: 129/84 123/74 134/75 129/74   Pulse: 74 79 75 73   Resp: 22 18 16 19   Temp:       TempSrc:       SpO2: 100% 100% 100% 100%   Weight:       Height:           Chest pain radiating to the back.  Not classically anginal in nature.  Potential pleurisy, pericarditis, given her prior dissection, will proceed with CTA to assess for endoleak.  Vitals are reassuring, she is mentating perfusing appropriately.    ED Course as of 06/16/25 2336  ------------------------------------------------------------  Time: 06/16 1942  Comment: EKG interpretation by me: EKG sinus rhythm at a rate of 109, axis nomal, no concerning acute ischemic ST changes  ------------------------------------------------------------  Time: 06/16 1943  Value: Potassium(!!): 2.7  Comment: repleted  ------------------------------------------------------------  Time: 06/16 2233  Value: CTA CHEST+CTA ABDOMEN DISSECT SET (CPT=71275/20248)  Comment: (Reviewed)  ------------------------------------------------------------  Time: 06/16 2241  Comment: My interpretation of CT with postoperative changes.  Radiologist noting interval recurrent stenting of the thoracic aorta with resolution of the previously noted endoleak.  The overall diameter of the posterior aspect of the aortic arch has increased currently measuring 43 mm in diameter, previously   measuring 38 mm     ------------------------------------------------------------  Time: 06/16 9199  Comment: On reassessment patient is feeling much more comfortable.  Remains hemodynamically stable.  ------------------------------------------------------------  Time: 06/16 5807  Comment: I consulted with vascular  surgery Dr. Potter, I discussed image findings with enlargement of aortic arch.  Recommending admission and consultation with [place] cardiology for further workup.         Disposition and Plan     Clinical Impression:  1. Chest pain of uncertain etiology    2. Status post aortic dissection repair        Disposition:  Admit    Follow-up:  No follow-up provider specified.    Medications Prescribed:  Current Discharge Medication List          Hospital Problems       Present on Admission  Date Reviewed: 2025          ICD-10-CM Noted POA    * (Principal) Chest pain of uncertain etiology R07.9 2025 Unknown               [1]   Past Medical History:   Arthritis    Chronic kidney disease (CKD)    Esophageal reflux    High blood pressure    High cholesterol    Hyperlipidemia    HYPERTENSION    Hypertension    Unspecified essential hypertension   [2]   Past Surgical History:  Procedure Laterality Date    Anesth,open heart surgery  2024    Colonoscopy N/A 2018    Procedure: COLONOSCOPY;  Surgeon: Magdy Webber MD;  Location: [facility] ENDOSCOPY    Colonoscopy  2018    Endometrial ablation      child passed away for 5 mins          1    Other surgical history  2011    cysto-Dr. Lacey -- pt denies

## 2025-06-17 NOTE — PLAN OF CARE
Alisha Wilde Patient Status:  Emergency    10/25/1965 MRN [id number]   Location [facility] EMERGENCY DEPARTMENT Attending Manan Richardson MD   Hosp Day # 0 PCP Bridger Avendano MD     Cardiology Nocturnal APN Note    Briefly: (Documentation from chart review)     Alisha Wilde is a 59 yr old F  who presented with chest pain.  Since her surgery in march she has had intermittent episodes   and has a PMH/PSH of: h/o Type I ascending aortic dissection s/p repair, HTN diabetes HL TANYA increased BMI      Past Medical History[1]    Primary Cardiologist Selina Montgomery     Vital Signs:       2025    10:00 PM 2025    10:30 PM   Vitals History   /75 129/74   Pulse 75 73   Resp 16 19   SpO2 100 % 100 %        Labs:   Lab Results   Component Value Date    WBC 6.0 2025    HGB 10.7 2025    HCT 33.8 2025    .0 2025    CREATSERUM 1.36 2025    BUN 11 2025     2025    K 2.7 2025     2025    CO2 22.0 2025    GLU 97 2025    CA 9.8 2025    ALB 4.8 2025    ALKPHO 63 2025    BILT 0.3 2025    TP 7.9 2025    AST 12 2025    ALT <7 2025    MG 2.3 2025    TROPHS 18 2025       Diagnostics:   CTA CHEST+CTA ABDOMEN DISSECT SET (CPT=71275/26203)  Result Date: 2025  P the ROCEDURE:  CTA CHEST CTA ABDOMEN DISSECT SET (CPT=71275/34986)  COMPARISON:  [place] , CT, CTA CHEST CTA ABDOMEN CTA PELVIS (CPT=71275/37937), 2025, 12:50 PM.  INDICATIONS:  chest pain, dyspnea, back pain, history of aortic dissection and aortic stenting., recent CT imaging revealed an endoleak of the aortic stent with subsequent recurrent aortic stenting.  TECHNIQUE:  CT images were created without and with non-ionic intravenous contrast material.  Multi-planar reformatted/3-D MIP post processing was performed to optimize visualization of vascular anatomy.  Dose reduction techniques were used. Dose information is  transmitted to the ACR (American College of Radiology) NRDR (National Radiology Data Registry) which includes the Dose Index Registry.  PATIENT STATED HISTORY:(As transcribed by Technologist)  Patient presents with new chest pain and difficulty breathing with a history of dissection.   CONTRAST USED:  100cc of Isovue 370  FINDINGS:   CHEST: LUNGS/PLEURA:  The previously noted bilateral effusions have resolved.  There is decreased atelectasis versus scarring within the left lower lobe. MEDIASTINUM:  Postoperative changes of the thoracic aorta, innominate artery and left subclavian are again noted with evidence of additional/double stenting of the thoracic aorta.  The previously noted mediastinal fluid surrounding the aorta has significantly resolved.  There is no current evidence of aortic endoleak.  No pericardial effusion identified. HAMLET:  No mass or adenopathy.  CARDIAC:  Mild cardiomegaly.  No pericardial effusion. CHEST WALL:  No mass or axillary adenopathy.  THORACIC AORTA:  There has been interval repeat stenting of the thoracic aorta with no evidence of recurrent endoleak along the distal arch.  The overall diameter of the distal arch has increased, currently measuring 43 mm, previously measuring 38 mm.  There is a persisting, stable dissection flap involving the left subclavian artery proximally.  VASCULATURE:  There is a stable appearing dissection flap involving the proximal left subclavian artery.  Stable stent graft involving the innominate artery and origin of the left subclavian.  ABDOMEN: LIVER:  Multiple low-density simple nonenhancing cysts of the liver again identified, the largest of which is seen within the posterior segment of the right lobe measuring 1.2 cm.  No enhancing or suspicious hepatic lesions are noted.  No further workup required or recommended. BILIARY:  No visible dilatation or calcification.  PANCREAS:  No lesion, fluid collection, ductal dilatation, or atrophy.  SPLEEN:  No  enlargement or focal lesion.  KIDNEYS:  Multiple simple low-density bilateral renal cysts consistent with benign cysts.  The largest is seen along the left inferior pole measuring 5.1 cm.  No further workup required or recommended. ADRENALS:  No enlargement or mass.  ABDOMINAL AORTA:  Abdominal aortic stenting appears stable/unchanged from prior imaging and is again noted to extend into the common iliac arteries bilaterally.  No postoperative complications are appreciated. RETROPERITONEUM:  No adenopathy or mass.  BOWEL/MESENTERY:  No visible mass, obstruction, or bowel wall thickening.  Uncomplicated diverticular changes of the descending colon and sigmoid colon. ABDOMINAL WALL:  No mass or hernia.  BONES:  No bony lesion or fracture.               CONCLUSION:  1. There has been interval recurrent stenting of the thoracic aorta with resolution of the previously noted endoleak.  The overall diameter of the posterior aspect of the aortic arch has increased currently measuring 43 mm in diameter, previously measuring 38 mm. 2. Stable dissection flap involving the left subclavian artery proximally. 3. Interval resolution of bilateral pleural effusions, decreasing atelectasis and/or scarring within the left lower lobe. 4. No acute process within the chest, abdomen or pelvis identified.  Please see further, less significant details above.    LOCATION:  [facility]    Dictated by (CST): Larry Leblanc DO on 6/16/2025 at 10:17 PM     Finalized by (CST): Larry Lebalnc DO on 6/16/2025 at 10:30 PM         Allergies:  Allergies[2]    Medications:  Current Hospital Medications[3]    Assessment:   EKG shows sr w/o acute changes  CT chest shows 1. There has been interval recurrent stenting of the thoracic aorta with resolution of the previously noted endoleak.  The overall diameter of the posterior aspect of the aortic arch has increased currently measuring 43 mm in diameter, previously measuring 38 mm. 2. Stable dissection flap  involving the left subclavian artery proximally. 3. Interval resolution of bilateral pleural effusions, decreasing atelectasis and/or scarring within the left lower lobe. 4. No acute process within the chest, abdomen or pelvis identified.  Trop 18   K 2.7 creat 1.36  WBC 6 HH 10.7/33.8 plt 241  /73 HR 75 RR 16 O2 sats 100% RA  Med in ED  MS 4 mg IV, Zofran 4mg IV and klorcon 80 meq po   Currently asymptomatic       Plan:  Trend trops obtain EKG if indicated  Echo in am   Cbc cmp mag in am   Orders in signed and held   - Continue to monitor overnight  - Formal Cardiology consult to follow in AM.       Danika Rizvi NP  [facility]  6/16/2025  11:34 PM         [1]   Past Medical History:   Arthritis    Chronic kidney disease (CKD)    Esophageal reflux    High blood pressure    High cholesterol    Hyperlipidemia    HYPERTENSION    Hypertension    Unspecified essential hypertension   [2]   Allergies  Allergen Reactions    Atorvastatin MYALGIA    Pravastatin MYALGIA    Rosuvastatin MYALGIA    Seasonal Runny nose   [3] No current facility-administered medications for this encounter.

## 2025-06-17 NOTE — ED QUICK NOTES
Orders for admission, patient is aware of plan and ready to go upstairs. Any questions, please call ED RN Rose at extension [phone].     Patient Covid vaccination status: Fully vaccinated     COVID Test Ordered in ED: None    COVID Suspicion at Admission: N/A    Running Infusions: Medication Infusions[1] None    Mental Status/LOC at time of transport: A&Ox4    Other pertinent information:   CIWA score: N/A   NIH score:  N/A             [1]

## 2025-06-18 ENCOUNTER — PATIENT OUTREACH (OUTPATIENT)
Age: 60
End: 2025-06-18

## 2025-06-18 ENCOUNTER — PATIENT OUTREACH (OUTPATIENT)
Dept: CASE MANAGEMENT | Age: 60
End: 2025-06-18

## 2025-06-18 DIAGNOSIS — R09.82 POST-NASAL DRIP: ICD-10-CM

## 2025-06-18 LAB
ATRIAL RATE: 92 BPM
P AXIS: 70 DEGREES
P-R INTERVAL: 176 MS
Q-T INTERVAL: 376 MS
QRS DURATION: 94 MS
QTC CALCULATION (BEZET): 464 MS
R AXIS: 0 DEGREES
T AXIS: 81 DEGREES
VENTRICULAR RATE: 92 BPM

## 2025-06-18 NOTE — PAYOR COMM NOTE
--------------  DISCHARGE REVIEW    Payor: [employer] [facility]/HMO/POS/EPO  Subscriber #:  [id number]  Authorization Number: [id number]    Admit date: 6/17/25  Admit time:  12:14 AM  Discharge Date: 6/17/2025  2:30 PM     Admitting Physician: Chano Lau DO  Attending Physician:  No att. providers found  Primary Care Physician: Bridger Avendano MD       Discharge Summary Notes    No notes of this type exist for this encounter.         REVIEWER COMMENTS  DC to Home

## 2025-06-18 NOTE — PROGRESS NOTES
Transitional Care Management   Discharge Date: 25  Contact Date: 2025    Assessment:  (Insert appropriate Smartphrase below after completing flowsheet)  TCM Initial Assessment    General:  Assessment completed with: Patient  Patient Subjective: The patient states she is a bit tired but overall okay.  Chief Complaint: Controlled type 2 diabetes mellitus without complication, without long-term current use of insulin (HCC)  Aneurysm of aortic arch   Chest pain of uncertain etiology  Status post aortic dissection repair  Verify patient name and  with patient/ caregiver: Yes    Hospital Stay/Discharge:  Tell me what you understand of why you were in the hospital or emergency department: States she was experiencing chest pain.  Prior to leaving the hospital were your Discharge Instructions reviewed with you?: Yes  Did you receive a copy of your written Discharge Instructions?: Yes  What questions do you have about your Discharge Instructions?: None  Do you feel better or worse since you left the hospital or emergency department?: Better    Follow - Up Appointment:  Do you have a follow-up appointment?: No  Are there any barriers to getting to your follow-up appointment?: No    Home Health/DME:  Prior to leaving the hospital was Home Health (HH) arranged for you?: No     Prior to leaving the hospital or emergency department was Durable Medical Equipment (DME), medical supplies, or infusions arranged for you?: No  Are DME/medical supply/infusions needs identified by staff during this assessment?: No     Medications/Diet:       Were you given a different diet per your Discharge Instructions?: No     Questions/Concerns:  Do you have any questions or concerns that have not been discussed?: No         Follow-up Appointments:  Your appointments       Date & Time Appointment Department ([place])    2025 9:00 AM CDT PSYCH EVAL with Stacy Callaway, PhD [facility]  [facility])        Jun 25, 2025 3:00 PM CDT Hospital Follow Up with Bridger Avendano MD [facility] ([facility])        Jul 24, 2025 1:00 PM CDT Follow Up Visit with Hina Mcarthur MD [facility] ([facility])        Aug 19, 2025 8:00 AM CDT Office Visit with Bridger Avendano MD [facility] ([facility])    Please arrive 15 minutes prior to your scheduled appointment. Please also bring your Insurance card, Photo ID, and your medication bottles or a list of your current medication.    If your condition improves and this appointment is no longer needed, please contact your physician office to cancel.    Please verify with your primary care provider if your insurance requires a referral.              [facility]  [address]  [facility] [address]  [phone] [facility]  [address]  [facility] [id number] [phone] [facility]  [address] [id number] [phone]            Transitional Care Clinic  Was TCC Ordered: No  Was TCC Scheduled: No   - If yes: []  Advised patient to bring all medications and blood glucose meter/supplies if applicable.    Primary Care Provider (If no TCC appointment)  Does patient already have a PCP appointment scheduled? No  Care Manager Scheduled PCP office TCM appointment with patient      Specialist  Does the patient have any other follow-up appointment(s) that need to be scheduled? Yes   -If yes: Care Manager reviewed upcoming specialist appointments with patient: Yes   -Does the patient need assistance scheduling appointment(s): No      Book By Date: 7/2/25

## 2025-06-19 ENCOUNTER — TELEPHONE (OUTPATIENT)
Dept: FAMILY MEDICINE CLINIC | Facility: CLINIC | Age: 60
End: 2025-06-19

## 2025-06-19 RX ORDER — LEVOCETIRIZINE DIHYDROCHLORIDE 5 MG/1
5 TABLET, FILM COATED ORAL EVERY EVENING
Qty: 90 TABLET | Refills: 0 | Status: SHIPPED | OUTPATIENT
Start: 2025-06-19 | End: 2025-06-19 | Stop reason: CLARIF

## 2025-06-19 RX ORDER — LEVOCETIRIZINE DIHYDROCHLORIDE 5 MG/1
2.5 TABLET, FILM COATED ORAL EVERY EVENING
Qty: 45 TABLET | Refills: 0 | Status: SHIPPED | OUTPATIENT
Start: 2025-06-19 | End: 2025-09-17

## 2025-06-19 NOTE — TELEPHONE ENCOUNTER
Refill passed [facility] protocol.     Please review due to High High Dose: levocetirizine, 5 mg, Oral, Every evening Single dose of 5 mg exceeds recommended maximum of 2.5 mg by 100%  Daily dose of 5 mg exceeds recommended maximum of 1.25 mg by 300%  Frequency of 1 doses/day exceeds recommended maximum of 0.5 doses/day

## 2025-06-25 ENCOUNTER — OFFICE VISIT (OUTPATIENT)
Dept: FAMILY MEDICINE CLINIC | Facility: CLINIC | Age: 60
End: 2025-06-25
Payer: COMMERCIAL

## 2025-06-25 VITALS
HEIGHT: 65 IN | BODY MASS INDEX: 32.99 KG/M2 | OXYGEN SATURATION: 98 % | HEART RATE: 86 BPM | WEIGHT: 198 LBS | TEMPERATURE: 98 F | SYSTOLIC BLOOD PRESSURE: 132 MMHG | DIASTOLIC BLOOD PRESSURE: 66 MMHG

## 2025-06-25 DIAGNOSIS — R07.9 CHEST PAIN, UNSPECIFIED TYPE: Primary | ICD-10-CM

## 2025-06-25 DIAGNOSIS — Z12.31 ENCOUNTER FOR SCREENING MAMMOGRAM FOR BREAST CANCER: ICD-10-CM

## 2025-06-25 DIAGNOSIS — I10 ESSENTIAL HYPERTENSION: ICD-10-CM

## 2025-06-25 DIAGNOSIS — E11.9 CONTROLLED TYPE 2 DIABETES MELLITUS WITHOUT COMPLICATION, WITHOUT LONG-TERM CURRENT USE OF INSULIN (HCC): ICD-10-CM

## 2025-06-25 DIAGNOSIS — Z98.890 STATUS POST AORTIC DISSECTION REPAIR: ICD-10-CM

## 2025-06-25 DIAGNOSIS — D64.9 ANEMIA, UNSPECIFIED TYPE: ICD-10-CM

## 2025-06-25 DIAGNOSIS — E78.00 HYPERCHOLESTEREMIA: ICD-10-CM

## 2025-06-25 DIAGNOSIS — K59.00 CONSTIPATION, UNSPECIFIED CONSTIPATION TYPE: ICD-10-CM

## 2025-06-25 DIAGNOSIS — N18.30 STAGE 3 CHRONIC KIDNEY DISEASE, UNSPECIFIED WHETHER STAGE 3A OR 3B CKD (HCC): ICD-10-CM

## 2025-06-25 PROCEDURE — 99495 TRANSJ CARE MGMT MOD F2F 14D: CPT | Performed by: FAMILY MEDICINE

## 2025-06-25 RX ORDER — ROSUVASTATIN CALCIUM 5 MG/1
TABLET, COATED ORAL
COMMUNITY
Start: 2025-06-19

## 2025-06-25 NOTE — PROGRESS NOTES
The following individual(s) verbally consented to be recorded using ambient AI listening technology and understand that they can each withdraw their consent to this listening technology at any point by asking the clinician to turn off or pause the recording:    Patient name: Alisha Wilde    Subjective:   Alisha Wilde is a 59 year old female who presents for hospital follow up.   She was discharged from [facility] to Home or Self Care  Admission Date: 6/16/25   Discharge Date: 6/17/25  Hospital Discharge Diagnosis: chest pain , Hypertension, Diabetes mellitus , s/p aortic dissection repair    Interactive contact within 2 business days post discharge first initiated on Date: 6/18/2025    During the visit, the following was completed:  Obtained and reviewed discharge summary, continuity of care documents, Hospitalist notes, and Cardiology notes  Reviewed Labs (CBC, CMP), Labs (Cardiac markers), CT radiology results, EKG, and Echocardiogram    HPI:     Wt Readings from Last 6 Encounters:   06/25/25 198 lb (89.8 kg)   06/17/25 198 lb 14.4 oz (90.2 kg)   05/26/25 210 lb (95.3 kg)   05/19/25 210 lb (95.3 kg)   05/16/25 210 lb (95.3 kg)   05/07/25 217 lb 2.5 oz (98.5 kg)     BP Readings from Last 3 Encounters:   06/25/25 132/66   06/17/25 118/57   06/13/25 123/64     History of Present Illness  Alisha Wilde is a 59 year old female with a history of aortic dissection repair who presents for follow-up after hospitalization for chest pain.    She was hospitalized on June 16th for chest pain. During her hospital stay, her troponin levels were negative, and her ECG was normal. A cardiologist evaluated her, and she recalls having a repeat echocardiogram during her hospital stay. Her blood pressure fluctuates, sometimes reaching 140 mmHg, which she attributes to stress and dietary factors such as salty foods.    She underwent an aortic ascending dissection repair in March. Since the surgery, she has experienced a  decreased appetite and finds that she needs to add lemon to her food to make it palatable. She hopes this change in taste is temporary.    Her kidney function remains slightly low, and she has an upcoming appointment with a nephrologist on July 31st. She has a history of anemia and takes ferrous gluconate, but not consistently due to constipation. Her hemoglobin was 9.4 g/dL on her most recent lab. She has not taken iron supplements in the past three weeks.    She has experienced significant weight loss since May, which she finds concerning. She is adjusting to her new weight and reports that her breathing and fatigue have improved. She is mindful of her diet, avoiding high-sodium foods.          History/Other:   Current Medications:  Medication Reconciliation:  I am aware of an inpatient discharge within the last 30 days.  The discharge medication list has been reconciled with the patient's current medication list and reviewed by me. See medication list for additions of new medication, and changes to current doses of medications and discontinued medications.  Outpatient Medications Marked as Taking for the 6/25/25 encounter (Office Visit) with Bridger Avendano MD   Medication Sig    rosuvastatin 5 MG Oral Tab rosuvastatin 5 mg tablet, [RxNorm: 950632]    levocetirizine 5 MG Oral Tab Take 0.5 tablets (2.5 mg total) by mouth every evening.    mirtazapine 7.5 MG Oral Tab Take 1 tablet (7.5 mg total) by mouth nightly.    hydroCHLOROthiazide 12.5 MG Oral Tab Take 1 tablet (12.5 mg total) by mouth daily.    furosemide 40 MG Oral Tab Take 1 tablet (40 mg total) by mouth daily.    clopidogrel 75 MG Oral Tab Take 1 tablet (75 mg total) by mouth daily.    thiamine 100 MG Oral Tab TAKE 1 TABLET DAILY WITH FEEDING TUBE    cloNIDine 0.1 MG Oral Tab Take 0.5 tablets (0.05 mg total) by mouth daily.    hydrALAZINE 100 MG Oral Tab Take 1 tablet (100 mg total) by mouth in the morning, at noon, and at bedtime.    acetaminophen 500 MG  Oral Tab Take 2 tablets (1,000 mg total) by mouth every 6 (six) hours as needed for Pain.    ezetimibe 10 MG Oral Tab Take 1 tablet (10 mg total) by mouth in the morning.    pantoprazole 40 MG Oral Tab EC Take 1 tablet (40 mg total) by mouth every morning before breakfast.    bisacodyl 5 MG Oral Tab EC Take 1 tablet (5 mg total) by mouth daily as needed.    docusate sodium (COLACE) 100 MG Oral Cap Take 1 capsule (100 mg total) by mouth 2 (two) times daily as needed for constipation.    Ferrous Gluconate 324 (37.5 Fe) MG Oral Tab Take 1 tablet (324 mg total) by mouth daily.    azelastine 0.1 % Nasal Solution 1 spray by Nasal route 2 (two) times daily.    amLODIPine 10 MG Oral Tab Take 1 tablet (10 mg total) by mouth daily.       Review of Systems   Constitutional:  Positive for appetite change. Negative for chills and fever.   Eyes:  Negative for visual disturbance.   Respiratory:  Negative for cough, shortness of breath and wheezing.    Cardiovascular:  Negative for chest pain, palpitations and leg swelling.   Genitourinary:  Negative for decreased urine volume and difficulty urinating.   Neurological:  Negative for dizziness, tremors, seizures, weakness, light-headedness, numbness and headaches.        Negative except weight loss, change in tase, gets anxious about chest pain   On long term disability with work   Looking to get out of her apartment lease and move in with sibling    Objective:   Physical Exam  Vitals and nursing note reviewed.   Constitutional:       Appearance: She is well-developed.   Eyes:      Pupils: Pupils are equal, round, and reactive to light.   Neck:      Thyroid: No thyromegaly.   Cardiovascular:      Rate and Rhythm: Normal rate and regular rhythm.      Heart sounds: No murmur heard.  Pulmonary:      Effort: Pulmonary effort is normal.      Breath sounds: Normal breath sounds. No wheezing.   Abdominal:      Palpations: There is no mass.      Tenderness: There is no abdominal tenderness.  There is no right CVA tenderness or left CVA tenderness.   Musculoskeletal:      Cervical back: Normal range of motion and neck supple.      Right lower leg: No edema.      Left lower leg: No edema.   Skin:     General: Skin is warm and dry.      Findings: No rash.   Neurological:      Mental Status: She is alert and oriented to person, place, and time.       Bilateral barefoot skin diabetic exam is normal, visualized feet and the appearance is normal.  Bilateral monofilament/sensation of both feet is normal.  Pulsation pedal pulse exam of both lower legs/feet is normal as well.        /66   Pulse 86   Temp 97.7 °F (36.5 °C) (Temporal)   Ht 5' 5\" (1.651 m)   Wt 198 lb (89.8 kg)   SpO2 98%   BMI 32.95 kg/m²  Estimated body mass index is 32.95 kg/m² as calculated from the following:    Height as of this encounter: 5' 5\" (1.651 m).    Weight as of this encounter: 198 lb (89.8 kg).    Assessment & Plan:   1. Chest pain, unspecified type (Primary)  2. Status post aortic dissection repair  3. Essential hypertension  4. Hypercholesteremia  5. Stage 3 chronic kidney disease, unspecified whether stage 3a or 3b CKD (HCC)  6. Anemia, unspecified type  7. Constipation, unspecified constipation type  8. Encounter for screening mammogram for breast cancer  -     [facility] RUCHI 2D+3D SCREENING BILAT (CPT=77067/91613); Future; Expected date: 06/25/2025  9. Controlled type 2 diabetes mellitus without complication, without long-term current use of insulin ([facility])  -     OPHTHALMOLOGY - INTERNAL    Assessment & Plan  Chest Pain  Experienced chest pain on June 16, 2025, leading to hospitalization. Troponin levels were negative, and ECG was normal. Echocardiogram showed normal heart function with an ejection fraction of 60-65%, no blood clots, and normal right ventricular systolic function. Blood pressure is generally well-controlled, though occasionally elevated to 140 mmHg, likely due to stress or dietary factors.  - Continue  current antihypertensive medications.  - Monitor blood pressure regularly.  - Avoid high-sodium foods and manage stress levels.  Continue follow up cardiology    Chronic Kidney Disease  Chronic kidney disease with slightly low kidney function. Advised to avoid NSAIDs like Motrin and Advil and to stay hydrated. Scheduled to see a nephrologist on July 31, 2025.  - Avoid NSAIDs such as Motrin and Advil.  - Maintain adequate hydration.  - Attend nephrology appointment on July 31, 2025.    Anemia  Anemia with fluctuating iron levels. Recent hemoglobin level dropped to 9.4 g/dL. Reports constipation as a side effect of iron supplements, leading to inconsistent use. Advised to resume iron supplementation with strategies to improve absorption and manage constipation.  - Resume taking ferrous gluconate at night with orange juice to improve absorption.  - Use stool softeners and consider using Dulcolax as needed for constipation.  - Consider trying Easy Iron, an over-the-counter supplement that may be easier on the stomach.    Diabetes Mellitus  Diabetes is well-controlled with a recent A1c of 5.9% in April 2025. Significant weight loss noted, which is beneficial for diabetes management.  - Continue current diabetes management plan.  - Encourage continued healthy eating and weight management.    General Health Maintenance  Due for several health maintenance screenings. Concerns about pain from mammogram due to recent surgery. Discussed alternative options for colon cancer screening due to recent surgery. Cologuard test considered as an alternative to colonoscopy, but may not be suitable due to previous colonoscopy findings.  - Provide order for mammogram; she will decide timing based on comfort and cardiologist's advice.  - Refer for diabetic eye exam.  - Consider Cologuard test for colon cancer screening instead of colonoscopy.  - Schedule physical exam for October 2025.    Recording duration: 12 minutes        Return in  about 4 months (around 10/25/2025) for physical.

## 2025-07-02 RX ORDER — CLOPIDOGREL BISULFATE 75 MG/1
75 TABLET ORAL DAILY
Qty: 30 TABLET | Refills: 0 | OUTPATIENT
Start: 2025-07-02

## 2025-07-02 NOTE — TELEPHONE ENCOUNTER
Patient states she is out of Clopidogrel 75 mg and this dose was prescribed when she was in the hospital. Patient  states it is hard to get hold of [facility].     Spoke to Carol RN at [facility] to request refill for patient since she is out of the medication She will refill the medication. Patient notified.

## 2025-07-10 ENCOUNTER — TELEPHONE (OUTPATIENT)
Dept: PULMONOLOGY | Facility: CLINIC | Age: 60
End: 2025-07-10

## 2025-07-10 NOTE — TELEPHONE ENCOUNTER
Patient requesting orders to be sent to [facility] - she was told that insurance will stop paying for Cpap because she is not using daily.  Patient states she will start using daily and is requesting orders to inform [facility].  Please call.  Thank you.

## 2025-07-14 ENCOUNTER — LAB ENCOUNTER (OUTPATIENT)
Dept: LAB | Age: 60
End: 2025-07-14
Attending: INTERNAL MEDICINE
Payer: COMMERCIAL

## 2025-07-14 DIAGNOSIS — N18.9 CHRONIC KIDNEY DISEASE, UNSPECIFIED CKD STAGE: ICD-10-CM

## 2025-07-14 LAB
ALBUMIN SERPL-MCNC: 4.8 G/DL (ref 3.2–4.8)
ANION GAP SERPL CALC-SCNC: 14 MMOL/L (ref 0–18)
BUN BLD-MCNC: 16 MG/DL (ref 9–23)
BUN/CREAT SERPL: 10.6 (ref 10–20)
CALCIUM BLD-MCNC: 9.7 MG/DL (ref 8.7–10.4)
CHLORIDE SERPL-SCNC: 104 MMOL/L (ref 98–112)
CO2 SERPL-SCNC: 22 MMOL/L (ref 21–32)
CREAT BLD-MCNC: 1.51 MG/DL (ref 0.55–1.02)
CREAT UR-SCNC: 188.1 MG/DL
EGFRCR SERPLBLD CKD-EPI 2021: 39 ML/MIN/1.73M2 (ref 60–?)
GLUCOSE BLD-MCNC: 110 MG/DL (ref 70–99)
MICROALBUMIN UR-MCNC: 6.7 MG/DL
MICROALBUMIN/CREAT 24H UR-RTO: 35.6 UG/MG (ref ?–30)
OSMOLALITY SERPL CALC.SUM OF ELEC: 292 MOSM/KG (ref 275–295)
PHOSPHATE SERPL-MCNC: 3 MG/DL (ref 2.4–5.1)
POTASSIUM SERPL-SCNC: 2.7 MMOL/L (ref 3.5–5.1)
PTH-INTACT SERPL-MCNC: 62.5 PG/ML (ref 18.5–88)
SODIUM SERPL-SCNC: 140 MMOL/L (ref 136–145)
VIT D+METAB SERPL-MCNC: 60.2 NG/ML (ref 30–100)

## 2025-07-14 PROCEDURE — 36415 COLL VENOUS BLD VENIPUNCTURE: CPT

## 2025-07-14 PROCEDURE — 82043 UR ALBUMIN QUANTITATIVE: CPT

## 2025-07-14 PROCEDURE — 82306 VITAMIN D 25 HYDROXY: CPT

## 2025-07-14 PROCEDURE — 83970 ASSAY OF PARATHORMONE: CPT

## 2025-07-14 PROCEDURE — 82570 ASSAY OF URINE CREATININE: CPT

## 2025-07-14 PROCEDURE — 80069 RENAL FUNCTION PANEL: CPT

## 2025-07-14 NOTE — TELEPHONE ENCOUNTER
Per Fish, patient non-compliant. Received CPAP machine on 2/6/25. Patient non- compliant due to multiple surgeries and complications. Patient notified she will need to return CPAP machine and have new office visit. Patient scheduled for 7/29/25. Reviewed date, time, place and where to park. Patient verbalized understanding.

## 2025-07-16 ENCOUNTER — TELEPHONE (OUTPATIENT)
Dept: FAMILY MEDICINE CLINIC | Facility: CLINIC | Age: 60
End: 2025-07-16

## 2025-07-16 ENCOUNTER — RESULTS FOLLOW-UP (OUTPATIENT)
Facility: CLINIC | Age: 60
End: 2025-07-16

## 2025-07-16 DIAGNOSIS — N18.30 STAGE 3 CHRONIC KIDNEY DISEASE, UNSPECIFIED WHETHER STAGE 3A OR 3B CKD (HCC): Primary | ICD-10-CM

## 2025-07-16 RX ORDER — POTASSIUM CHLORIDE 750 MG/1
40 TABLET, EXTENDED RELEASE ORAL 2 TIMES DAILY
Qty: 40 TABLET | Refills: 2 | Status: SHIPPED | OUTPATIENT
Start: 2025-07-16 | End: 2025-07-21

## 2025-07-16 NOTE — TELEPHONE ENCOUNTER
Dr Mcarthur  informed patient of the note below  and last name verified ,reported doing fine,having mild leg cramping ,feeling fatigue no chest pain or palpitations,Confirmed is taking hydrochlorothiazide 12.5 mg daily.no longer taking furosemide .Advised make sure to take the meds and if symptoms worse to go to ER ,pt verbalized understanding.

## 2025-07-22 ENCOUNTER — LAB ENCOUNTER (OUTPATIENT)
Dept: LAB | Age: 60
End: 2025-07-22
Attending: INTERNAL MEDICINE
Payer: COMMERCIAL

## 2025-07-22 LAB
ANION GAP SERPL CALC-SCNC: 10 MMOL/L (ref 0–18)
BUN BLD-MCNC: 17 MG/DL (ref 9–23)
BUN/CREAT SERPL: 11.2 (ref 10–20)
CALCIUM BLD-MCNC: 10.1 MG/DL (ref 8.7–10.4)
CHLORIDE SERPL-SCNC: 107 MMOL/L (ref 98–112)
CO2 SERPL-SCNC: 22 MMOL/L (ref 21–32)
CREAT BLD-MCNC: 1.52 MG/DL (ref 0.55–1.02)
EGFRCR SERPLBLD CKD-EPI 2021: 39 ML/MIN/1.73M2 (ref 60–?)
FASTING STATUS PATIENT QL REPORTED: NO
GLUCOSE BLD-MCNC: 93 MG/DL (ref 70–99)
OSMOLALITY SERPL CALC.SUM OF ELEC: 289 MOSM/KG (ref 275–295)
POTASSIUM SERPL-SCNC: 3.9 MMOL/L (ref 3.5–5.1)
SODIUM SERPL-SCNC: 139 MMOL/L (ref 136–145)

## 2025-07-22 PROCEDURE — 36415 COLL VENOUS BLD VENIPUNCTURE: CPT | Performed by: INTERNAL MEDICINE

## 2025-07-22 PROCEDURE — 80048 BASIC METABOLIC PNL TOTAL CA: CPT | Performed by: INTERNAL MEDICINE

## 2025-07-24 ENCOUNTER — OFFICE VISIT (OUTPATIENT)
Facility: CLINIC | Age: 60
End: 2025-07-24

## 2025-07-24 VITALS
DIASTOLIC BLOOD PRESSURE: 71 MMHG | SYSTOLIC BLOOD PRESSURE: 119 MMHG | WEIGHT: 191 LBS | BODY MASS INDEX: 32 KG/M2 | HEART RATE: 93 BPM

## 2025-07-24 DIAGNOSIS — N18.32 HYPERTENSIVE KIDNEY DISEASE WITH STAGE 3B CHRONIC KIDNEY DISEASE (HCC): ICD-10-CM

## 2025-07-24 DIAGNOSIS — I12.9 HYPERTENSIVE KIDNEY DISEASE WITH STAGE 3B CHRONIC KIDNEY DISEASE (HCC): ICD-10-CM

## 2025-07-24 DIAGNOSIS — N18.32 STAGE 3B CHRONIC KIDNEY DISEASE (HCC): Primary | ICD-10-CM

## 2025-07-24 DIAGNOSIS — I71.00 DISSECTION OF AORTA, UNSPECIFIED PORTION OF AORTA (HCC): ICD-10-CM

## 2025-07-24 PROCEDURE — 99214 OFFICE O/P EST MOD 30 MIN: CPT | Performed by: INTERNAL MEDICINE

## 2025-07-24 RX ORDER — POTASSIUM CHLORIDE 1500 MG/1
40 TABLET, EXTENDED RELEASE ORAL DAILY
Qty: 180 TABLET | Refills: 2 | Status: SHIPPED | OUTPATIENT
Start: 2025-07-24 | End: 2025-07-24

## 2025-07-24 RX ORDER — POTASSIUM CHLORIDE 1500 MG/1
40 TABLET, EXTENDED RELEASE ORAL DAILY
COMMUNITY

## 2025-07-24 NOTE — PROGRESS NOTES
Progress Note     Alisha Wilde    Here for follow up  Had low potassium value     Medications (Active prior to today's visit):  Current Outpatient Medications   Medication Sig Dispense Refill    rosuvastatin 5 MG Oral Tab rosuvastatin 5 mg tablet, [RxNorm: 231536]      levocetirizine 5 MG Oral Tab Take 0.5 tablets (2.5 mg total) by mouth every evening. 45 tablet 0    mirtazapine 7.5 MG Oral Tab Take 1 tablet (7.5 mg total) by mouth nightly. 90 tablet 0    hydroCHLOROthiazide 12.5 MG Oral Tab Take 1 tablet (12.5 mg total) by mouth daily. 90 tablet 0    clopidogrel 75 MG Oral Tab Take 1 tablet (75 mg total) by mouth daily.      thiamine 100 MG Oral Tab TAKE 1 TABLET DAILY WITH FEEDING TUBE 90 tablet 1    cloNIDine 0.1 MG Oral Tab Take 0.5 tablets (0.05 mg total) by mouth daily.      hydrALAZINE 100 MG Oral Tab Take 1 tablet (100 mg total) by mouth in the morning, at noon, and at bedtime. 90 tablet 2    acetaminophen 500 MG Oral Tab Take 2 tablets (1,000 mg total) by mouth every 6 (six) hours as needed for Pain.      ezetimibe 10 MG Oral Tab Take 1 tablet (10 mg total) by mouth in the morning.      pantoprazole 40 MG Oral Tab EC Take 1 tablet (40 mg total) by mouth every morning before breakfast. 90 tablet 3    bisacodyl 5 MG Oral Tab EC Take 1 tablet (5 mg total) by mouth daily as needed. 30 tablet 0    docusate sodium (COLACE) 100 MG Oral Cap Take 1 capsule (100 mg total) by mouth 2 (two) times daily as needed for constipation. 180 capsule 1    Ferrous Gluconate 324 (37.5 Fe) MG Oral Tab Take 1 tablet (324 mg total) by mouth daily. 90 tablet 1    azelastine 0.1 % Nasal Solution 1 spray by Nasal route 2 (two) times daily. 3 each 1    amLODIPine 10 MG Oral Tab Take 1 tablet (10 mg total) by mouth daily. 90 tablet 3           ROS:     Constitutional:  Negative for decreased activity, fever, irritability and lethargy  ENMT:  Negative for ear drainage, hearing loss and nasal drainage  Eyes:  Negative for eye  discharge and vision loss  Cardiovascular:  Negative for chest pain, sob  Respiratory:  Negative for cough, dyspnea and wheezing  Gastrointestinal:  Negative for abdominal pain, constipation  Genitourinary:  Negative for dysuria and hematuria  Endocrine:  Negative for abnormal sleep patterns  Hema/Lymph:  Negative for easy bleeding and easy bruising  Integumentary:  Negative for pruritus and rash  Musculoskeletal:  Negative for bone/joint symptoms  Neurological:  Negative for gait disturbance  Psychiatric:  Negative for inappropriate interaction and psychiatric symptoms      Vitals:    07/24/25 1257   BP: 119/71   Pulse: 93       PHYSICAL EXAM:   Constitutional: appears well hydrated alert and responsive   Head/Face: normocephalic  Eyes/Vision: normal extraocular motion is intact  Nose/Mouth/Throat:mucous membranes are moist   Neck/Thyroid: neck is supple without adenopathy  Lymphatic: no abnormal cervical, supraclavicular adenopathy is noted  Respiratory:  lungs are clear to auscultation bilaterally  Cardiovascular: regular rate and rhythm   Abdomen: soft, non-tender, non-distended, BS normal  Skin/Hair: no unusual rashes present, no abnormal bruising noted  Musculoskeletal: no deformities  Extremities: no edema  Neurological:  Grossly normal       Lab Results   Component Value Date    GLU 93 07/22/2025     07/22/2025    K 3.9 07/22/2025     07/22/2025    CO2 22.0 07/22/2025    ANIONGAP 10 07/22/2025    BUN 17 07/22/2025    CREATSERUM 1.52 (H) 07/22/2025    CA 10.1 07/22/2025    OSMOCALC 289 07/22/2025    EGFRCR 39 (L) 07/22/2025    ALB 4.8 07/14/2025    PHOS 3.0 07/14/2025         ASSESSMENT/PLAN:   Assessment   1. Stage 3b chronic kidney disease (HCC)  GFR is now at 39 - stable    2. Hypertensive kidney disease with stage 3b chronic kidney disease (HCC)  BP stable - amlodipine, hydralazine, HCTZ    3. Dissection of aorta, unspecified portion of aorta (HCC)  Follows with Dr Ulises Lagunas  This Visit:  Orders Placed This Encounter   Procedures    Basic Metabolic Panel (8)       Meds This Visit:  Requested Prescriptions      No prescriptions requested or ordered in this encounter       Imaging & Referrals:  None     7/24/2025   ZAY LINARES MD    Return in about 6 months (around 1/24/2026).

## 2025-07-28 RX ORDER — MIRTAZAPINE 7.5 MG/1
7.5 TABLET, FILM COATED ORAL NIGHTLY
Qty: 90 TABLET | Refills: 1 | Status: SHIPPED | OUTPATIENT
Start: 2025-07-28

## 2025-07-28 NOTE — TELEPHONE ENCOUNTER
For replies, please route to pool: [facility] CENTRAL REFILLS    Please review: medication fails/has no protocol attached.    Future Appointments   Date Time Provider Department Center   10/22/2025 11:00 AM [facility]

## 2025-07-29 ENCOUNTER — OFFICE VISIT (OUTPATIENT)
Dept: PULMONOLOGY | Facility: CLINIC | Age: 60
End: 2025-07-29

## 2025-07-29 VITALS
OXYGEN SATURATION: 94 % | SYSTOLIC BLOOD PRESSURE: 123 MMHG | WEIGHT: 190.81 LBS | DIASTOLIC BLOOD PRESSURE: 69 MMHG | HEART RATE: 100 BPM | RESPIRATION RATE: 12 BRPM | HEIGHT: 65 IN | BODY MASS INDEX: 31.79 KG/M2

## 2025-07-29 DIAGNOSIS — J45.30 MILD PERSISTENT ASTHMA WITHOUT COMPLICATION (HCC): Primary | ICD-10-CM

## 2025-07-29 DIAGNOSIS — G47.33 OSA (OBSTRUCTIVE SLEEP APNEA): ICD-10-CM

## 2025-07-29 DIAGNOSIS — R09.82 POSTNASAL DRIP: ICD-10-CM

## 2025-07-29 DIAGNOSIS — R09.81 NASAL CONGESTION: ICD-10-CM

## 2025-07-29 PROCEDURE — 99214 OFFICE O/P EST MOD 30 MIN: CPT | Performed by: INTERNAL MEDICINE

## 2025-07-31 ENCOUNTER — TELEPHONE (OUTPATIENT)
Dept: FAMILY MEDICINE CLINIC | Facility: CLINIC | Age: 60
End: 2025-07-31

## 2025-07-31 ENCOUNTER — MED REC SCAN ONLY (OUTPATIENT)
Dept: FAMILY MEDICINE CLINIC | Facility: CLINIC | Age: 60
End: 2025-07-31

## 2025-07-31 RX ORDER — AZELASTINE HYDROCHLORIDE 137 UG/1
1 SPRAY, METERED NASAL 2 TIMES DAILY
Qty: 3 EACH | Refills: 1 | Status: SHIPPED | OUTPATIENT
Start: 2025-07-31

## 2025-07-31 RX ORDER — LEVOCETIRIZINE DIHYDROCHLORIDE 5 MG/1
5 TABLET, FILM COATED ORAL EVERY EVENING
Qty: 90 TABLET | Refills: 0 | OUTPATIENT
Start: 2025-07-31

## 2025-08-04 ENCOUNTER — APPOINTMENT (OUTPATIENT)
Dept: GENERAL RADIOLOGY | Facility: HOSPITAL | Age: 60
End: 2025-08-04

## 2025-08-04 ENCOUNTER — HOSPITAL ENCOUNTER (EMERGENCY)
Facility: HOSPITAL | Age: 60
Discharge: HOME | End: 2025-08-04
Attending: EMERGENCY MEDICINE

## 2025-08-04 VITALS
SYSTOLIC BLOOD PRESSURE: 130 MMHG | RESPIRATION RATE: 18 BRPM | WEIGHT: 190 LBS | TEMPERATURE: 98 F | HEART RATE: 75 BPM | BODY MASS INDEX: 31.65 KG/M2 | OXYGEN SATURATION: 100 % | HEIGHT: 65 IN | DIASTOLIC BLOOD PRESSURE: 76 MMHG

## 2025-08-04 DIAGNOSIS — R07.9 CHEST PAIN OF UNCERTAIN ETIOLOGY: Primary | ICD-10-CM

## 2025-08-04 LAB
ALBUMIN SERPL-MCNC: 5 G/DL (ref 3.2–4.8)
ALBUMIN/GLOB SERPL: 1.5 (ref 1–2)
ALP LIVER SERPL-CCNC: 70 U/L (ref 50–130)
ALT SERPL-CCNC: 8 U/L (ref 10–49)
ANION GAP SERPL CALC-SCNC: 13 MMOL/L (ref 0–18)
AST SERPL-CCNC: 16 U/L (ref ?–34)
BASOPHILS # BLD AUTO: 0.03 X10(3) UL (ref 0–0.2)
BASOPHILS NFR BLD AUTO: 0.6 %
BILIRUB SERPL-MCNC: 0.2 MG/DL (ref 0.2–1.1)
BUN BLD-MCNC: 16 MG/DL (ref 9–23)
BUN/CREAT SERPL: 11.2 (ref 10–20)
CALCIUM BLD-MCNC: 9.9 MG/DL (ref 8.7–10.4)
CHLORIDE SERPL-SCNC: 104 MMOL/L (ref 98–112)
CO2 SERPL-SCNC: 22 MMOL/L (ref 21–32)
CREAT BLD-MCNC: 1.43 MG/DL (ref 0.55–1.02)
DEPRECATED RDW RBC AUTO: 48.8 FL (ref 35.1–46.3)
EGFRCR SERPLBLD CKD-EPI 2021: 42 ML/MIN/1.73M2 (ref 60–?)
EOSINOPHIL # BLD AUTO: 0.07 X10(3) UL (ref 0–0.7)
EOSINOPHIL NFR BLD AUTO: 1.3 %
ERYTHROCYTE [DISTWIDTH] IN BLOOD BY AUTOMATED COUNT: 17.5 % (ref 11–15)
GLOBULIN PLAS-MCNC: 3.3 G/DL (ref 2–3.5)
GLUCOSE BLD-MCNC: 93 MG/DL (ref 70–99)
HCT VFR BLD AUTO: 36.5 % (ref 35–48)
HGB BLD-MCNC: 11.9 G/DL (ref 12–16)
IMM GRANULOCYTES # BLD AUTO: 0.01 X10(3) UL (ref 0–1)
IMM GRANULOCYTES NFR BLD: 0.2 %
LYMPHOCYTES # BLD AUTO: 1.82 X10(3) UL (ref 1–4)
LYMPHOCYTES NFR BLD AUTO: 34.3 %
MCH RBC QN AUTO: 24.8 PG (ref 26–34)
MCHC RBC AUTO-ENTMCNC: 32.6 G/DL (ref 31–37)
MCV RBC AUTO: 76.2 FL (ref 80–100)
MONOCYTES # BLD AUTO: 0.46 X10(3) UL (ref 0.1–1)
MONOCYTES NFR BLD AUTO: 8.7 %
NEUTROPHILS # BLD AUTO: 2.91 X10 (3) UL (ref 1.5–7.7)
NEUTROPHILS # BLD AUTO: 2.91 X10(3) UL (ref 1.5–7.7)
NEUTROPHILS NFR BLD AUTO: 54.9 %
OSMOLALITY SERPL CALC.SUM OF ELEC: 289 MOSM/KG (ref 275–295)
PLATELET # BLD AUTO: 198 10(3)UL (ref 150–450)
POTASSIUM SERPL-SCNC: 3.3 MMOL/L (ref 3.5–5.1)
PROT SERPL-MCNC: 8.3 G/DL (ref 5.7–8.2)
RBC # BLD AUTO: 4.79 X10(6)UL (ref 3.8–5.3)
SODIUM SERPL-SCNC: 139 MMOL/L (ref 136–145)
TROPONIN I SERPL HS-MCNC: 13 NG/L (ref ?–34)
WBC # BLD AUTO: 5.3 X10(3) UL (ref 4–11)

## 2025-08-04 PROCEDURE — 85025 COMPLETE CBC W/AUTO DIFF WBC: CPT | Performed by: EMERGENCY MEDICINE

## 2025-08-04 PROCEDURE — 99284 EMERGENCY DEPT VISIT MOD MDM: CPT

## 2025-08-04 PROCEDURE — 71045 X-RAY EXAM CHEST 1 VIEW: CPT | Performed by: EMERGENCY MEDICINE

## 2025-08-04 PROCEDURE — 80053 COMPREHEN METABOLIC PANEL: CPT

## 2025-08-04 PROCEDURE — 93005 ELECTROCARDIOGRAM TRACING: CPT

## 2025-08-04 PROCEDURE — 36415 COLL VENOUS BLD VENIPUNCTURE: CPT

## 2025-08-04 PROCEDURE — 93010 ELECTROCARDIOGRAM REPORT: CPT

## 2025-08-04 PROCEDURE — 99285 EMERGENCY DEPT VISIT HI MDM: CPT

## 2025-08-04 PROCEDURE — 80053 COMPREHEN METABOLIC PANEL: CPT | Performed by: EMERGENCY MEDICINE

## 2025-08-04 PROCEDURE — 84484 ASSAY OF TROPONIN QUANT: CPT

## 2025-08-04 PROCEDURE — 84484 ASSAY OF TROPONIN QUANT: CPT | Performed by: EMERGENCY MEDICINE

## 2025-08-04 PROCEDURE — 85025 COMPLETE CBC W/AUTO DIFF WBC: CPT

## 2025-08-06 LAB
ATRIAL RATE: 90 BPM
P AXIS: 59 DEGREES
P-R INTERVAL: 182 MS
Q-T INTERVAL: 378 MS
QRS DURATION: 90 MS
QTC CALCULATION (BEZET): 462 MS
R AXIS: 0 DEGREES
T AXIS: 88 DEGREES
VENTRICULAR RATE: 90 BPM

## 2025-08-07 ENCOUNTER — MED REC SCAN ONLY (OUTPATIENT)
Dept: FAMILY MEDICINE CLINIC | Facility: CLINIC | Age: 60
End: 2025-08-07

## 2025-08-26 ENCOUNTER — LAB ENCOUNTER (OUTPATIENT)
Dept: LAB | Age: 60
End: 2025-08-26
Attending: INTERNAL MEDICINE

## 2025-08-26 DIAGNOSIS — N18.32 STAGE 3B CHRONIC KIDNEY DISEASE (HCC): ICD-10-CM

## 2025-08-26 LAB
ANION GAP SERPL CALC-SCNC: 8 MMOL/L (ref 0–18)
BUN BLD-MCNC: 18 MG/DL (ref 9–23)
BUN/CREAT SERPL: 12.3 (ref 10–20)
CALCIUM BLD-MCNC: 9.7 MG/DL (ref 8.7–10.4)
CHLORIDE SERPL-SCNC: 106 MMOL/L (ref 98–112)
CO2 SERPL-SCNC: 26 MMOL/L (ref 21–32)
CREAT BLD-MCNC: 1.46 MG/DL (ref 0.55–1.02)
EGFRCR SERPLBLD CKD-EPI 2021: 41 ML/MIN/1.73M2 (ref 60–?)
FASTING STATUS PATIENT QL REPORTED: NO
GLUCOSE BLD-MCNC: 86 MG/DL (ref 70–99)
OSMOLALITY SERPL CALC.SUM OF ELEC: 291 MOSM/KG (ref 275–295)
POTASSIUM SERPL-SCNC: 3.8 MMOL/L (ref 3.5–5.1)
SODIUM SERPL-SCNC: 140 MMOL/L (ref 136–145)

## 2025-08-26 PROCEDURE — 36415 COLL VENOUS BLD VENIPUNCTURE: CPT

## 2025-08-26 PROCEDURE — 80048 BASIC METABOLIC PNL TOTAL CA: CPT

## 2025-08-27 ENCOUNTER — TELEPHONE (OUTPATIENT)
Dept: FAMILY MEDICINE CLINIC | Facility: CLINIC | Age: 60
End: 2025-08-27

## 2025-08-27 RX ORDER — MELATONIN
Qty: 90 TABLET | Refills: 1 | OUTPATIENT
Start: 2025-08-27

## 2025-08-27 RX ORDER — LEVOCETIRIZINE DIHYDROCHLORIDE 5 MG/1
5 TABLET, FILM COATED ORAL EVERY EVENING
Qty: 90 TABLET | Refills: 0 | OUTPATIENT
Start: 2025-08-27

## 2025-08-27 RX ORDER — HYDROCHLOROTHIAZIDE 12.5 MG/1
12.5 TABLET ORAL DAILY
Qty: 90 TABLET | Refills: 0 | Status: SHIPPED | OUTPATIENT
Start: 2025-08-27

## (undated) DIAGNOSIS — I10 ESSENTIAL HYPERTENSION: ICD-10-CM

## (undated) DIAGNOSIS — E78.00 HYPERCHOLESTEREMIA: Primary | ICD-10-CM

## (undated) DIAGNOSIS — Z86.010 HX OF COLONIC POLYPS: Primary | ICD-10-CM

## (undated) DEVICE — GOWN,SIRUS,FAB REINF,RAGLAN,L,STERILE: Brand: MEDLINE

## (undated) DEVICE — PEEL-AWAY INTRODUCER: Brand: PEEL-AWAY

## (undated) DEVICE — RADIFOCUS GLIDEWIRE: Brand: GLIDEWIRE

## (undated) DEVICE — GLIDESHEATH SLENDER STAINLESS STEEL KIT: Brand: GLIDESHEATH SLENDER

## (undated) DEVICE — SYSTEM RETEN PANNUS W/ ADH PD MED GRD HK LOOP

## (undated) DEVICE — APPLICATOR PREP 26ML CHG 2% ISO ALC 70%

## (undated) DEVICE — SOLUTION IV 1000ML 0.9% NACL PRESERVATIVE

## (undated) DEVICE — GAMMEX® PI HYBRID SIZE 6.5, STERILE POWDER-FREE SURGICAL GLOVE, POLYISOPRENE AND NEOPRENE BLEND: Brand: GAMMEX

## (undated) DEVICE — DRAIN SPONGES,6 PLY: Brand: EXCILON

## (undated) DEVICE — SNARE CAPTIFLEX MICRO-OVL OLY

## (undated) DEVICE — PROXIMATE SKIN STAPLERS (35 WIDE) CONTAINS 35 STAINLESS STEEL STAPLES (FIXED HEAD): Brand: PROXIMATE

## (undated) DEVICE — MONITORING NEUROPHYSIOLOGICAL

## (undated) DEVICE — PTA BALLOON DILATATION CATHETER: Brand: MUSTANG™

## (undated) DEVICE — ANGIOGRAPHIC CATHETER: Brand: EXPO™

## (undated) DEVICE — KIT HEMSTAT MTRX 8ML PORCINE GEL HUM THROM

## (undated) DEVICE — STANDARD HYPODERMIC NEEDLE,POLYPROPYLENE HUB: Brand: MONOJECT

## (undated) DEVICE — ADAPTER CRDPLG ANTGRD RTRGD 3W

## (undated) DEVICE — SHEATH INTRO 6.5FR L55CM DEFLECTION L17MM

## (undated) DEVICE — ADHESIVE LIQ 2/3ML VI MASTISOL

## (undated) DEVICE — ABSORBABLE HEMOSTAT (OXIDIZED REGENERATED CELLULOSE): Brand: SURGICEL NU-KNIT

## (undated) DEVICE — 3M™ BAIR HUGGER® UNDERBODY BLANKET, FULL ACCESS, 10 PER CASE 63500: Brand: BAIR HUGGER™

## (undated) DEVICE — CONMED SCOPE SAVER BITE BLOCK, 20X27 MM: Brand: SCOPE SAVER

## (undated) DEVICE — SUT 6 DBL WIRE 14IN CCS-1 NABSRB L49MM 1/2 CI

## (undated) DEVICE — ENDOSCOPY PACK - LOWER: Brand: MEDLINE INDUSTRIES, INC.

## (undated) DEVICE — 450 ML BOTTLE OF 0.05% CHLORHEXIDINE GLUCONATE IN 99.95% STERILE WATER FOR IRRIGATION, USP AND APPLICATOR.: Brand: IRRISEPT ANTIMICROBIAL WOUND LAVAGE

## (undated) DEVICE — SUT PERMA- 3-0 30IN SH NABSRB BLK 26MM 1/2

## (undated) DEVICE — RADIFOCUS GLIDEWIRE ADVANTAGE GUIDEWIRE: Brand: GLIDEWIRE ADVANTAGE

## (undated) DEVICE — CATH F4 INF JR 4 100CM: Brand: INFINITI

## (undated) DEVICE — DRESSING FOAM 4.25IN LEN 12.5IN W WND PD N

## (undated) DEVICE — SPONGE GZ 4IN X 4IN RAYON POLY 6 PLY UNIQUE

## (undated) DEVICE — ABSORBABLE HEMOSTAT (OXIDIZED REGENERATED CELLULOSE): Brand: SURGICEL

## (undated) DEVICE — GAUZE,SPONGE,FLUFF,6"X6.75",STRL,5/TRAY: Brand: MEDLINE

## (undated) DEVICE — Device: Brand: INTELLICART™

## (undated) DEVICE — WIRE ROSEN J 0.035INX260CM

## (undated) DEVICE — INSULATED BLADE ELECTRODE: Brand: EDGE

## (undated) DEVICE — SUT ETHBND XL 2-0 30IN V-5 NABSRB GRN WHT L17

## (undated) DEVICE — SUT PERMA- 2-0 30IN SH NABSRB BLK L26MM 1/

## (undated) DEVICE — Device: Brand: PORTEX

## (undated) DEVICE — SOLUTION IRRIG 1000ML 0.9% NACL USP BTL

## (undated) DEVICE — CS5/5+ FASTPACK, 225ML 150U RES: Brand: HAEMONETICS CELL SAVER 5/5+ SYSTEMS

## (undated) DEVICE — KIT NEG PRSS PREVENA DRAIN 20CM INCIS MGMT PEEL PALACE

## (undated) DEVICE — SUT ETHBND XL 0 30IN CT-1 NABSRB GRN 36MM 1/2

## (undated) DEVICE — SOLUTION IRRIG 250ML 0.9% NACL

## (undated) DEVICE — Device

## (undated) DEVICE — CO2 CANNULA,SSOFT,ADLT,7O2,4CO2,FEMALE: Brand: MEDLINE

## (undated) DEVICE — SUT PROL 5-0 36IN C-1 NABSRB BLU 13MM 3/8 CIR

## (undated) DEVICE — CANNULA PERF PED AD 9FR L5.5IN AORT ROOT FLNG

## (undated) DEVICE — AV FISTULA PACK: Brand: MEDLINE INDUSTRIES, INC.

## (undated) DEVICE — CATH TEMPO 5F PIG 110CM 5SH: Brand: TEMPO

## (undated) DEVICE — 12 FOOT DISPOSABLE EXTENSION CABLE WITH SAFE CONNECT / SCREW-DOWN

## (undated) DEVICE — LIGASURE EXACT DISSECTOR: Brand: LIGASURE

## (undated) DEVICE — FLEXOR, TUOHY-BORST SIDE-ARM INTRODUCER SHUTTLE, - SL: Brand: FLEXOR

## (undated) DEVICE — FIXED CORE WIRE GUIDE SAFE-T-J, CURVED: Brand: COOK

## (undated) DEVICE — FORESIGHT LARGE SENSOR: Brand: FORESIGHT

## (undated) DEVICE — 3M™ TEGADERM™ TRANSPARENT FILM DRESSING FRAME STYLE, 1626W, 4 IN X 4-3/4 IN (10 CM X 12 CM), 50/CT 4CT/CASE: Brand: 3M™ TEGADERM™

## (undated) DEVICE — 3M™ IOBAN™ 2 ANTIMICROBIAL INCISE DRAPE 6650EZ: Brand: IOBAN™ 2

## (undated) DEVICE — Device: Brand: VISIONS PV .035 DIGITAL IVUS CATHETER

## (undated) DEVICE — A STERILE, SEMI-RIGID TUBE USED IN OPEN HEART SURGERY TO FACILITATE THE TRANSFER OF PRIMING FLUIDS DURING THE PRIMING OF THE EXTRACORPOREAL CIRCUIT OF A CARDIOPULMONARY BYPASS SYSTEM. IT IS A NONINVASIVE PRODUCT IN THE FORM OF A TUBE OR TUBING USED TO CHANNEL THE FLUIDS (I.E., PRIMER/BLOOD) TO FACILITATE PRIMING OF THE EXTRACORPOREAL CIRCUIT AND CONNECTION OF THE CARDIOPULMONARY BYPASS SYSTEM (HEART/LUNG MACHINE) TO THE PATIENT. IT IS TYPICALLY A MOULDED PLASTIC TUBE WITH HARD PLASTIC SPIKE AT THE DISTAL END THAT CONNECTS TO THE BAG OR CONTAINER OF PRIMER SOLUTION, A CONNECTOR AT THE PROXIMAL END, AND A CENTRAL PINCH CLAMP. THIS IS A SINGLE-USE DEVICE.: Brand: QUICKIE PRIME - 1/4" X 1/16"

## (undated) DEVICE — TOURNIQUET 12FR L7IN 3 CLR 1 SNR VENA CAVA

## (undated) DEVICE — ACROBAT 2 CALIBRATED TIP WIRE GUIDE: Brand: ACROBAT

## (undated) DEVICE — COVER CAMERA LIGHT HANDLE

## (undated) DEVICE — NDLCTR: FOAM/ADHESIVE 10CT 96/CS: Brand: MEDICAL ACTION INDUSTRIES

## (undated) DEVICE — YANKAUER,FLEXIBLE HANDLE,REGLR CAPACITY: Brand: MEDLINE INDUSTRIES, INC.

## (undated) DEVICE — GUIDEWIRE WITH ICE™ HYDROPHILIC COATING: Brand: V-18™ CONTROL WIRE™

## (undated) DEVICE — ADVANCE, 35LP LOW PROFILE PTA BALLOON DILATATION CATHETER: Brand: ADVANCE

## (undated) DEVICE — PINNACLE INTRODUCER SHEATH: Brand: PINNACLE

## (undated) DEVICE — INTENDED TO BE USED TO OCCLUDE, RETRACT AND IDENTIFY ARTERIES, VEINS, TENDONS AND NERVES IN SURGICAL PROCEDURES: Brand: STERION®  VESSEL LOOP

## (undated) DEVICE — DEVICE BLWR/MSTR ACCUMIST ATCH

## (undated) DEVICE — GAMMEX® PI HYBRID SIZE 8, STERILE POWDER-FREE SURGICAL GLOVE, POLYISOPRENE AND NEOPRENE BLEND: Brand: GAMMEX

## (undated) DEVICE — GAUZE,SPONGE,4"X4",12PLY,STERILE,LF,2'S: Brand: MEDLINE

## (undated) DEVICE — RETROGRADE CARDIOPLEGIA CATHETER: Brand: EDWARDS LIFESCIENCES RETROGRADE CARDIOPLEGIA CATHETER

## (undated) DEVICE — ADHESIVE SKIN TOP FOR WND CLSR DERMBND ADV

## (undated) DEVICE — HEART DRAPE AND SUPPLY: Brand: MEDLINE INDUSTRIES, INC.

## (undated) DEVICE — CATHETER PTCA BLLN L4CM INFL 10-37MM CATH

## (undated) DEVICE — SUT VCRL 2-0 36IN CT-1 ABSRB UD L36MM 1/2 CIR

## (undated) DEVICE — INSERT SUT HLDR RETEN SLOT DISP FOR OCTOBASE

## (undated) DEVICE — HOLDER RESTRN 3X13IN THK0.25IN STRP L31IN

## (undated) DEVICE — WIRE LUNDERQUIST DC 300 2CRV

## (undated) DEVICE — ROSEN CURVED WIRE GUIDE: Brand: ROSEN

## (undated) DEVICE — SUT PROL 4-0 36IN RB-1 NABSRB BLU 17MM 1/2 CI

## (undated) DEVICE — SUT PROL 7-0 24IN BV-1 NABSRB BLU 9.3MM 3/8 C

## (undated) DEVICE — STERILE POLYISOPRENE POWDER-FREE SURGICAL GLOVES: Brand: PROTEXIS

## (undated) DEVICE — HEART A: Brand: MEDLINE INDUSTRIES, INC.

## (undated) DEVICE — SUT VCRL 1-0 36IN CTX ABSRB UD L48MM 1/2 CIR

## (undated) DEVICE — NAVICROSS SUPPORT CATHETER: Brand: NAVICROSS

## (undated) DEVICE — KIT ENDO ORCAPOD 160/180/190

## (undated) DEVICE — CANNULA PERF 6MM TIP 3MM COR ART R ANG

## (undated) DEVICE — SOLUTION IRRIG 500ML 0.9% NACL

## (undated) DEVICE — PACK ANGIOGRAPHY CUSTOM

## (undated) DEVICE — SUT MCRYL 4-0 18IN PS-2 ABSRB UD 19MM 3/8 CIR

## (undated) DEVICE — PACK CDS HEAD

## (undated) DEVICE — MEDI-VAC NON-CONDUCTIVE SUCTION TUBING 6MM X 1.8M (6FT.) L: Brand: CARDINAL HEALTH

## (undated) DEVICE — SUT PERMAHAND 1 L30IN N ABSRB BLK TIE SILK

## (undated) DEVICE — PACK ASSRY CUSTOM TUBING

## (undated) DEVICE — JELLY,LUBE,STERILE,FLIP TOP,TUBE,2-OZ: Brand: MEDLINE

## (undated) DEVICE — KIT INFL DEV 20ML W/ INSRT TOOL 3 W STPCOCK

## (undated) DEVICE — PACK PERFUSION CUSTOM W BCARE5

## (undated) DEVICE — DRAPE DISC W112XL168CM DISP FOR HUSH SLUSH

## (undated) DEVICE — CATHETER THOR 32FR L23IN BLU RADPQ STRP THRM

## (undated) DEVICE — SUT PROL 6-0 24IN C-1 NABSRB BLU 13MM 3/8 CIR

## (undated) DEVICE — INDY OTW VASCULAR RETRIEVER: Brand: INDY OTW

## (undated) DEVICE — PERCLOSE™ PROSTYLE™ SUTURE-MEDIATED CLOSURE AND REPAIR SYSTEM: Brand: PERCLOSE™ PROSTYLE™

## (undated) DEVICE — YANKAUER,BULB TIP,W/O VENT,RIGID,STERILE: Brand: MEDLINE

## (undated) DEVICE — LINE SUCKER

## (undated) DEVICE — SUT PROL SZ 5-0 36IN RB-1 NABSRB BL

## (undated) DEVICE — GEL US 20 GM PKT ST AQSNC 100

## (undated) DEVICE — DRAIN SUR W10MMXL20CM SIL FULL PERF HUBLESS

## (undated) DEVICE — MEDI-VAC NON-CONDUCTIVE SUCTION TUBING: Brand: CARDINAL HEALTH

## (undated) DEVICE — LEAD PACE 475MM CHN A OR V MYOCARDIAL STEROID

## (undated) DEVICE — SUT PERMA- 2-0 18IN SH NABSRB BLK CR 26MM

## (undated) DEVICE — HANDLE SUR BLU PLAS LT FLX SLIP ON ST DISP

## (undated) DEVICE — BEACON TIP TORCON NB ADVANTAGE CATHETER: Brand: BEACON TIP TORCON NB

## (undated) DEVICE — SUT PERMA- 3-0 30IN NABSRB BLK TIE SILK

## (undated) DEVICE — EVACUATOR SUR 100CC SIL BLB WND

## (undated) DEVICE — INTENDED FOR TISSUE SEPARATION, AND OTHER PROCEDURES THAT REQUIRE A SHARP SURGICAL BLADE TO PUNCTURE OR CUT.: Brand: BARD-PARKER ® STAINLESS STEEL BLADES

## (undated) DEVICE — TRAP 4 CPTR CHMBR N EZ INLN

## (undated) DEVICE — GAMMEX® PI HYBRID SIZE 6, STERILE POWDER-FREE SURGICAL GLOVE, POLYISOPRENE AND NEOPRENE BLEND: Brand: GAMMEX

## (undated) DEVICE — UNDYED BRAIDED (POLYGLACTIN 910), SYNTHETIC ABSORBABLE SUTURE: Brand: COATED VICRYL

## (undated) DEVICE — CURITY NON-ADHERENT STRIPS: Brand: CURITY

## (undated) DEVICE — BAND COMPR REG 24CM CLR PLAS HEMSTAT VASC BND

## (undated) DEVICE — TRAY CATH FOLEY 350CC 16FR CATH BACTIGUARD SIL

## (undated) DEVICE — KENDALL SCD EXPRESS SLEEVES, KNEE LENGTH, MEDIUM: Brand: KENDALL SCD

## (undated) DEVICE — SUT COAT VCRL 3-0 18IN SH ABSRB UD CR 26MM

## (undated) DEVICE — ANTIBACTERIAL UNDYED BRAIDED (POLYGLACTIN 910), SYNTHETIC ABSORBABLE SUTURE: Brand: COATED VICRYL

## (undated) DEVICE — GUIDEWIRE: Brand: AMPLATZ SUPER STIFF™

## (undated) DEVICE — PERCUTANEOUS INSERTION KIT-ARTERIAL: Brand: PERCUTANEOUS INSERTION KIT-ARTERIAL

## (undated) DEVICE — KIT CLEAN ENDOKIT 1.1OZ GOWNX2

## (undated) DEVICE — UMBILICAL TAPE: Brand: DEROYAL

## (undated) DEVICE — SUT PROL 3-0 30IN NABSRB BLU 22MM SH-1 1/2

## (undated) DEVICE — CHECK-FLO HEMOSTASIS ASSEMBLY: Brand: CHECK-FLO

## (undated) DEVICE — CANISTER NEG PRSS 500ML W/ GEL INFOVAC

## (undated) DEVICE — GUIDEWIRE: Brand: NITINOL GUIDEWIRE

## (undated) DEVICE — SLEEVE COMPR MD KNEE LEN SGL USE KENDALL SCD

## (undated) DEVICE — SUT PROL 6-0 30IN C-1 NABSRB BLU 13MM 3/8 CIR

## (undated) DEVICE — LINE VENT

## (undated) DEVICE — FLEXOR, TUOHY-BORST SIDE-ARM INTRODUCER SHUTTLE SELECT,: Brand: FLEXOR

## (undated) DEVICE — HEMOSTAT KIT SURGIFLO THROM 8ML

## (undated) DEVICE — CATHETER ANGIO 5FR L65CM GWIRE 0.038IN KMP

## (undated) DEVICE — V2 SPECIMEN COLLECTION MANIFOLD KIT: Brand: NEPTUNE

## (undated) DEVICE — SUT PROL 4-0 36IN V-5 NABSRB BLU L17MM 1/2 CI

## (undated) DEVICE — DRAIN CHST SGL COLL 1 PT TB FOR ATS BG CMPTBL

## (undated) DEVICE — 60 ML SYRINGE REGULAR TIP: Brand: MONOJECT

## (undated) DEVICE — Device: Brand: DEFENDO AIR/WATER/SUCTION AND BIOPSY VALVE

## (undated) DEVICE — PISTOL GRIP SKIN STAPLER: Brand: MULTIFIRE PREMIUM

## (undated) DEVICE — CANNULA SUCT L15IN DIA32/40FR NVENT CONN

## (undated) DEVICE — SET TRNQT SUR 12FR TWO 1 BLU 1 SNR DLP

## (undated) DEVICE — PD DEFIB QUIK COMB REDI-PK

## (undated) DEVICE — CABLE BPLR L12FT FLYING LD DISP

## (undated) DEVICE — SUCTION CANISTER, 3000CC,SAFELINER: Brand: DEROYAL

## (undated) DEVICE — CONNECTOR PERF 3/8X0.25IN REDUC STR

## (undated) DEVICE — SPONGE: SPECIALTY PEANUT XR 100/CS: Brand: MEDICAL ACTION INDUSTRIES

## (undated) DEVICE — 12 ML SYRINGE LUER-LOCK TIP: Brand: MONOJECT

## (undated) DEVICE — OPTISITE ARTERIAL PERFUSION CANNULA: Brand: OPTISITE ARTERIAL PERFUSION CANNULA

## (undated) DEVICE — PACK CV CUSTOM

## (undated) DEVICE — SUT PERMA- 2-0 30IN NABSRB BLK TIE SILK

## (undated) DEVICE — CODA, BALLOON CATHETER: Brand: CODA

## (undated) DEVICE — 3M™ IOBAN™ 2 ANTIMICROBIAL INCISE DRAPE 6651EZ: Brand: IOBAN™ 2

## (undated) NOTE — LETTER
8/9/2023          To Whom It May Concern:    Manasa Sánchez is currently under my medical care and may not return to work at this time. Please excuse Haroon Muñoz for 5 days. She may return to work on 8/12/23. Activity is restricted as follows: none. If you require additional information please contact our office. Sincerely,    Manuel Barrow MD

## (undated) NOTE — LETTER
[address]  [address]  Authorization for Surgical Operation and Procedure     Date:___________                                                                                                         Time:__________  I hereby authorize Surgeon(s):  Frank Holder MD, my physician and his/her assistants (if applicable), which may include medical students, residents, and/or fellows, to perform the following surgical operation/ procedure and administer such anesthesia as may be determined necessary by my physician: debridement of right groin incision and integra graft placement on Alisha Wilde   2.   I recognize that during the surgical operation/procedure, unforeseen conditions may necessitate additional or different procedures than those listed above.  I, therefore, further authorize and request that the above-named surgeon, assistants, or designees perform such procedures as are, in their judgment, necessary and desirable.    3.   My surgeon/physician has discussed prior to my surgery the potential benefits, risks and side effects of this procedure; the likelihood of achieving goals; and potential problems that might occur during recuperation.  They also discussed reasonable alternatives to the procedure, including risks, benefits, and side effects related to the alternatives and risks related to not receiving this procedure.  I have had all my questions answered and I acknowledge that no guarantee has been made as to the result that may be obtained.    4.   Should the need arise during my operation/procedure, which includes change of level of care prior to discharge, I also consent to the administration of blood and/or blood products.  Further, I understand that despite careful testing and screening of blood or blood products by collecting agencies, I may still be subject to ill effects as a result of receiving a blood transfusion and/or blood products.  The following are  some, but not all, of the potential risks that can occur: fever and allergic reactions, hemolytic reactions, transmission of diseases such as Hepatitis, AIDS and Cytomegalovirus (CMV) and fluid overload.  In the event that I wish to have an autologous transfusion of my own blood, or a directed donor transfusion, I will discuss this with my physician.  Check only if Refusing Blood or Blood Products  I understand refusal of blood or blood products as deemed necessary by my physician may have serious consequences to my condition to include possible death. I hereby assume responsibility for my refusal and release the hospital, its personnel, and my physicians from any responsibility for the consequences of my refusal.          o  Refuse      5.   I authorize the use of any specimen, organs, tissues, body parts or foreign objects that may be removed from my body during the operation/procedure for diagnosis, research or teaching purposes and their subsequent disposal by hospital authorities.  I also authorize the release of specimen test results and/or written reports to my treating physician on the hospital medical staff or other referring or consulting physicians involved in my care, at the discretion of the Pathologist or my treating physician.    6.   I consent to the photographing or videotaping of the operations or procedures to be performed, including appropriate portions of my body for medical, scientific, or educational purposes, provided my identity is not revealed by the pictures or by descriptive texts accompanying them.  If the procedure has been photographed/videotaped, the surgeon will obtain the original picture, image, videotape or CD.  The hospital will not be responsible for storage, release or maintenance of the picture, image, tape or CD.    7.   I consent to the presence of a  or observers in the operating room as deemed necessary by my physician or their designees.    8.   I  recognize that in the event my procedure results in extended X-Ray/fluoroscopy time, I may develop a skin reaction.    9. If I have a Do Not Attempt Resuscitation (DNAR) order in place, that status will be suspended while in the operating room, procedural suite, and during the recovery period unless otherwise explicitly stated by me (or a person authorized to consent on my behalf). The surgeon or my attending physician will determine when the applicable recovery period ends for purposes of reinstating the DNAR order.  10. Patients having a sterilization procedure: I understand that if the procedure is successful the results will be permanent and it will therefore be impossible for me to inseminate, conceive, or bear children.  I also understand that the procedure is intended to result in sterility, although the result has not been guaranteed.   11. I acknowledge that my physician has explained sedation/analgesia administration to me including the risk and benefits I consent to the administration of sedation/analgesia as may be necessary or desirable in the judgment of my physician.    I CERTIFY THAT I HAVE READ AND FULLY UNDERSTAND THE ABOVE CONSENT TO OPERATION and/or OTHER PROCEDURE.    _________________________________________  __________________________________  Signature of Patient     Signature of Responsible Person         ___________________________________         Printed Name of Responsible Person           _________________________________                 Relationship to Patient  _________________________________________  ______________________________  Signature of Witness          Date  Time      Patient Name: Alisha Wilde     : 10/25/1965                 Printed: May 7, 2025     Medical Record #: [id number]                     Page [address] of [address] [place] [address], [place]  [address]    Consent for Anesthesia    I, Alisha Wilde agree to be cared for  by an anesthesiologist, who is specially trained to monitor me and give me medicine to put me to sleep or keep me comfortable during my procedure    I understand that my anesthesiologist is not an employee or agent of [facility] or [employer]. He or she works for [employer].    As the patient asking for anesthesia services, I agree to:  Allow the anesthesiologist (anesthesia doctor) to give me medicine and do additional procedures as necessary. Some examples are: Starting or using an “IV” to give me medicine, fluids or blood during my procedure, and having a breathing tube placed to help me breathe when I’m asleep (intubation). In the event that my heart stops working properly, I understand that my anesthesiologist will make every effort to sustain my life, unless otherwise directed by [facility] Do Not Resuscitate documents.  Tell my anesthesia doctor before my procedure:  If I am pregnant.  The last time that I ate or drank.  All of the medicines I take (including prescriptions, herbal supplements, and pills I can buy without a prescription (including street drugs/illegal medications). Failure to inform my anesthesiologist about these medicines may increase my risk of anesthetic complications.  If I am allergic to anything or have had a reaction to anesthesia before.  I understand how the anesthesia medicine will help me (benefits).  I understand that with any type of anesthesia medicine there are risks:  The most common risks are: nausea, vomiting, sore throat, muscle soreness, damage to my eyes, mouth, or teeth (from breathing tube placement).  Rare risks include: remembering what happened during my procedure, allergic reactions to medications, injury to my airway, heart, lungs, vision, nerves, or muscles and in extremely rare instances death.  My doctor has explained to me other choices available to me for my care (alternatives).  Pregnant Patients (“epidural”):  I  understand that the risks of having an epidural (medicine given into my back to help control pain during labor), include itching, low blood pressure, difficulty urinating, headache or slowing of the baby’s heart. Very rare risks include infection, bleeding, seizure, irregular heart rhythms and nerve injury.  Regional Anesthesia (“spinal”, “epidural”, & “nerve blocks”):  I understand that rare but potential complications include headache, bleeding, infection, seizure, irregular heart rhythms, and nerve injury.    I can change my mind about having anesthesia services at any time before I get the medicine.    _____________________________________________________________________________  Patient (or Representative) Signature/Relationship to Patient  Date   Time    _____________________________________________________________________________   Name (if used)    Language/Organization   Time    _____________________________________________________________________________  Anesthesiologist Signature     Date   Time  I have discussed the procedure and information above with the patient (or patient’s representative) and answered their questions. The patient or their representative has agreed to have anesthesia services.    _____________________________________________________________________________  Witness        Date   Time  I have verified that the signature is that of the patient or patient’s representative, and that it was signed before the procedure  Patient Name: Alisha Wilde     : 10/25/1965                 Printed: May 7, 2025     Medical Record #: [id number]                     Page 2 of 2

## (undated) NOTE — LETTER
4/2/2021    Nicol Quiñonez        [address]            Dear Nicol Quiñonez,      Our records indicate that you are due for an appointment for a Colonoscopy in June 2021, or shortly there after, with Jay Loaiza

## (undated) NOTE — LETTER
[facility] 2W/SW  [address] [facility] [address]  [phone]    Blood Transfusion Consent    In the course of your treatment, it may become necessary to administer a transfusion of blood or blood components. This form provides basic information concerning this procedure and, if signed by you, authorizes its administration. By signing this form, you agree that all of your questions about the administration of blood or blood products have been answered by the ordering medical professional or designee.    Description of Procedure  Blood is introduced into one of your veins, commonly in the arm, using a sterilized disposable needle. The amount of blood transfused, and whether the transfusion will be of blood or blood components is a judgement the physician will make based on your particular needs.    Risks  The transfusion is a common procedure of low risk.  MINOR AND TEMPORARY REACTIONS ARE NOT UNCOMMON, including a slight bruise, swelling or local reaction in the area where the needle pierces your skin, or a nonserious reaction to the transfused material itself, including headache, fever or mild skin reaction, such as rash.  Serious reactions are possible, though very unlikely, and include severe allergic reaction (shock) and destruction (hemolysis) of transfused blood cells.  Infectious diseases which are known to be transmitted by blood transfusion include certain types of viral Hepatitis(liver infection from a virus), Human Immunodeficiency Virus (HIV-1,2) infection, a viral infection known to cause Acquired Immunodeficiency Syndrome (AIDS), as well as certain other bacterial, viral, and parasitic diseases. While a minimal risk of acquiring an infectious disease from transfused blood exists, in accordance with the Federal and State law, all due care has been taken in donor selection and testing to avoid transmission of disease.    Alternatives  If loss of blood poses serious threats during your  treatment, THERE IS NO EFFECTIVE ALTERNATIVE TO BLOOD TRANSFUSION. However, if you have any further questions on this matter, your provider will fully explain the alternatives to you if it has not already been done.    I, ______________________________, have read/had read to me the above. I understand the matters bearing on the decision whether or not to authorize a transfusion of blood or blood components. I have no questions which have not been answered to my full satisfaction. I hereby consent to such transfusion as my physician may deem necessary or advisable in the course of my treatment.    ______________________________________________                    ___________________________  (Signature of Patient or Responsible party in case of minor,                 (Printed Name of Patient or incompetent, or unconscious patient)              Responsible Party)    ___________________________               _____________________  (Relationship to Patient if not self)                                    (Date and Time)    __________________________                                                           ______________________              (Signature of Witness)               (Printed Name of Witness)     Language line ()    Telephone/Verbal/Video Consent    __________________________                     ____________________  (Signature of 2nd Witness           (Printed Name of 2nd  Telephone/Verbal/Video Consent)           Witness)    Patient Name: Alisha Wilde     : 10/25/1965                 Printed: 2024     Medical Record #: [id number]      Rev: 2023

## (undated) NOTE — ED AVS SNAPSHOT
Moraima Castillo   MRN: [id number]    Department:  [facility] Emergency Department   Date of Visit:  10/13/2018           Disclosure     Insurance plans vary and the physician(s) referred by the ER may not be covered by your plan.  Please contact y CARE PHYSICIAN AT ONCE OR RETURN IMMEDIATELY TO THE EMERGENCY DEPARTMENT. If you have been prescribed any medication(s), please fill your prescription right away and begin taking the medication(s) as directed.   If you believe that any of the medications

## (undated) NOTE — MR AVS SNAPSHOT
[address], [address]  [address] [facility]  [phone]               Thank you for choosing us for your health care visit with Nahomy Tipton.  MD Juan Alberto.  We are glad to serve you and happy to provide you with this summary may be held responsible for payment in full if proper authorization is not acquired. Please contact the Patient Business Office at [phone] if you have any questions related to insurance coverage. Thank you.          Referral Details     Referred By 138/88 mmHg 77 97.8 °F (36.6 °C) (Oral) 5' 5\" (1.651 m) 245 lb (111.131 kg) 40.77 kg/m2         Current Medications          This list is accurate as of: 6/20/17  4:29 PM.  Always use your most recent med list.                AmLODIPine Besylate 10 MG Ta

## (undated) NOTE — LETTER
[address]  [address]  Authorization for Surgical Operation and Procedure     Date:__4/7/25                                                                                                        Time:__1503________  I hereby authorize Surgeon(s):  Frank Holder MD Pontikis, George, MD, my physician and his/her assistants (if applicable), which may include medical students, residents, and/or fellows, to perform the following surgical operation/ procedure and administer such anesthesia as may be determined necessary by my physician:  Operation/Procedure name (s) Thoracic stent placement, left subclavian stent placement  on Alisha Wilde   2.   I recognize that during the surgical operation/procedure, unforeseen conditions may necessitate additional or different procedures than those listed above.  I, therefore, further authorize and request that the above-named surgeon, assistants, or designees perform such procedures as are, in their judgment, necessary and desirable.    3.   My surgeon/physician has discussed prior to my surgery the potential benefits, risks and side effects of this procedure; the likelihood of achieving goals; and potential problems that might occur during recuperation.  They also discussed reasonable alternatives to the procedure, including risks, benefits, and side effects related to the alternatives and risks related to not receiving this procedure.  I have had all my questions answered and I acknowledge that no guarantee has been made as to the result that may be obtained.    4.   Should the need arise during my operation/procedure, which includes change of level of care prior to discharge, I also consent to the administration of blood and/or blood products.  Further, I understand that despite careful testing and screening of blood or blood products by collecting agencies, I may still be subject to ill effects as a result of receiving a blood  transfusion and/or blood products.  The following are some, but not all, of the potential risks that can occur: fever and allergic reactions, hemolytic reactions, transmission of diseases such as Hepatitis, AIDS and Cytomegalovirus (CMV) and fluid overload.  In the event that I wish to have an autologous transfusion of my own blood, or a directed donor transfusion, I will discuss this with my physician.  Check only if Refusing Blood or Blood Products  I understand refusal of blood or blood products as deemed necessary by my physician may have serious consequences to my condition to include possible death. I hereby assume responsibility for my refusal and release the hospital, its personnel, and my physicians from any responsibility for the consequences of my refusal.          o  Refuse      5.   I authorize the use of any specimen, organs, tissues, body parts or foreign objects that may be removed from my body during the operation/procedure for diagnosis, research or teaching purposes and their subsequent disposal by hospital authorities.  I also authorize the release of specimen test results and/or written reports to my treating physician on the hospital medical staff or other referring or consulting physicians involved in my care, at the discretion of the Pathologist or my treating physician.    6.   I consent to the photographing or videotaping of the operations or procedures to be performed, including appropriate portions of my body for medical, scientific, or educational purposes, provided my identity is not revealed by the pictures or by descriptive texts accompanying them.  If the procedure has been photographed/videotaped, the surgeon will obtain the original picture, image, videotape or CD.  The hospital will not be responsible for storage, release or maintenance of the picture, image, tape or CD.    7.   I consent to the presence of a  or observers in the operating room as deemed  necessary by my physician or their designees.    8.   I recognize that in the event my procedure results in extended X-Ray/fluoroscopy time, I may develop a skin reaction.    9. If I have a Do Not Attempt Resuscitation (DNAR) order in place, that status will be suspended while in the operating room, procedural suite, and during the recovery period unless otherwise explicitly stated by me (or a person authorized to consent on my behalf). The surgeon or my attending physician will determine when the applicable recovery period ends for purposes of reinstating the DNAR order.  10. Patients having a sterilization procedure: I understand that if the procedure is successful the results will be permanent and it will therefore be impossible for me to inseminate, conceive, or bear children.  I also understand that the procedure is intended to result in sterility, although the result has not been guaranteed.   11. I acknowledge that my physician has explained sedation/analgesia administration to me including the risk and benefits I consent to the administration of sedation/analgesia as may be necessary or desirable in the judgment of my physician.    I CERTIFY THAT I HAVE READ AND FULLY UNDERSTAND THE ABOVE CONSENT TO OPERATION and/or OTHER PROCEDURE.    _________________________________________  __________________________________  Signature of Patient     Signature of Responsible Person         ___________________________________         Printed Name of Responsible Person           _________________________________                 Relationship to Patient  _________________________________________  ______________________________  Signature of Witness          Date  Time      Patient Name: Alisha Wilde     : 10/25/1965                 Printed: 2025     Medical Record #: [id number]                     Page 1 of 2                                    [address]  [address]    Consent  for Anesthesia    I, Alisha Wilde agree to be cared for by an anesthesiologist, who is specially trained to monitor me and give me medicine to put me to sleep or keep me comfortable during my procedure    I understand that my anesthesiologist is not an employee or agent of [facility] or [employer]. He or she works for [employer].    As the patient asking for anesthesia services, I agree to:  Allow the anesthesiologist (anesthesia doctor) to give me medicine and do additional procedures as necessary. Some examples are: Starting or using an “IV” to give me medicine, fluids or blood during my procedure, and having a breathing tube placed to help me breathe when I’m asleep (intubation). In the event that my heart stops working properly, I understand that my anesthesiologist will make every effort to sustain my life, unless otherwise directed by [facility] Do Not Resuscitate documents.  Tell my anesthesia doctor before my procedure:  If I am pregnant.  The last time that I ate or drank.  All of the medicines I take (including prescriptions, herbal supplements, and pills I can buy without a prescription (including street drugs/illegal medications). Failure to inform my anesthesiologist about these medicines may increase my risk of anesthetic complications.  If I am allergic to anything or have had a reaction to anesthesia before.  I understand how the anesthesia medicine will help me (benefits).  I understand that with any type of anesthesia medicine there are risks:  The most common risks are: nausea, vomiting, sore throat, muscle soreness, damage to my eyes, mouth, or teeth (from breathing tube placement).  Rare risks include: remembering what happened during my procedure, allergic reactions to medications, injury to my airway, heart, lungs, vision, nerves, or muscles and in extremely rare instances death.  My doctor has explained to me other choices available to me for my care  (alternatives).  Pregnant Patients (“epidural”):  I understand that the risks of having an epidural (medicine given into my back to help control pain during labor), include itching, low blood pressure, difficulty urinating, headache or slowing of the baby’s heart. Very rare risks include infection, bleeding, seizure, irregular heart rhythms and nerve injury.  Regional Anesthesia (“spinal”, “epidural”, & “nerve blocks”):  I understand that rare but potential complications include headache, bleeding, infection, seizure, irregular heart rhythms, and nerve injury.    I can change my mind about having anesthesia services at any time before I get the medicine.    _____________________________________________________________________________  Patient (or Representative) Signature/Relationship to Patient  Date   Time    _____________________________________________________________________________   Name (if used)    Language/Organization   Time    _____________________________________________________________________________  Anesthesiologist Signature     Date   Time  I have discussed the procedure and information above with the patient (or patient’s representative) and answered their questions. The patient or their representative has agreed to have anesthesia services.    _____________________________________________________________________________  Witness        Date   Time  I have verified that the signature is that of the patient or patient’s representative, and that it was signed before the procedure  Patient Name: Alisha Wilde     : 10/25/1965                 Printed: 2025     Medical Record #: [id number]                     Page 2 of 2

## (undated) NOTE — Clinical Note
3/2/2017              Zari Benjamin        [address]         To Whom It May Concern,    Patient may get dental extraction per her dentist using local anesthesia with lidocaine with epinephrine as long as m

## (undated) NOTE — MR AVS SNAPSHOT
[facility]  [address]  [phone]               Thank you for choosing us for your health care visit with Govind Bowen MD.  We are glad to serve you and happy to provide you with this summary IBUPROFEN OR   as needed. Losartan Potassium-HCTZ 50-12.5 MG Tabs   Take 1 tablet by mouth daily. Commonly known as:  HYZAAR           Omeprazole 40 MG Cpdr   Take 1 capsule (40 mg total) by mouth daily.  Before a meal                Where to G

## (undated) NOTE — LETTER
[facility] 4SW-A  [address] [address]  [phone]  AUTHORIZATION FOR SURGICAL OPERATION OR PROCEDURE                                                           I hereby authorize Magdy Keyes MD , my physician and his/her assistants (if applicable), which may include medical students, residents, and/or fellows, to perform the following surgical operation/ procedure and administer such anesthesia as may be determined necessary by my physician:  Operation/Procedure name (s) Thoracic stent placement, left subclavian stent placement   On Alisha Wilde.  2.   I recognize that during the surgical operation/procedure, unforeseen conditions may necessitate additional or different procedures than those listed above.  I, therefore, further authorize and request that the above-named surgeon, assistants, or designees perform such procedures as are, in their judgment, necessary and desirable.    3.   My surgeon/physician has discussed prior to my surgery the potential benefits, risks and side effects of this procedure; the likelihood of achieving goals; and potential problems that might occur during recuperation.  They also discussed reasonable alternatives to the procedure, including risks, benefits, and side effects related to the alternatives and risks related to not receiving this procedure.  I have had all my questions answered and I acknowledge that no guarantee has been made as to the result that may be obtained.    4.   Should the need arise during my operation/procedure, which includes change of level of care prior to discharge, I also consent to the administration of blood and/or blood products.  Further, I understand that despite careful testing and screening of blood or blood products by collecting agencies, I may still be subject to ill effects as a result of receiving a blood transfusion and/or blood products.  The following are some, but not all, of the potential  risks that can occur: fever and allergic reactions, hemolytic reactions, transmission of diseases such as Hepatitis, AIDS and Cytomegalovirus (CMV) and fluid overload.  In the event that I wish to have an autologous transfusion of my own blood, or a directed donor transfusion, I will discuss this with my physician.  Check only if Refusing Blood or Blood Products  I understand refusal of blood or blood products as deemed necessary by my physician may have serious consequences to my condition to include possible death. I hereby assume responsibility for my refusal and release the hospital, its personnel, and my physicians from any responsibility for the consequences of my refusal.          o  Refuse   5.   I authorize the use of any specimen, organs, tissues, body parts or foreign objects that may be removed from my body during the operation/procedure for diagnosis, research or teaching purposes and their subsequent disposal by hospital authorities.  I also authorize the release of specimen test results and/or written reports to my treating physician on the hospital medical staff or other referring or consulting physicians involved in my care, at the discretion of the Pathologist or my treating physician.    6.   I consent to the photographing or videotaping of the operations or procedures to be performed, including appropriate portions of my body for medical, scientific, or educational purposes, provided my identity is not revealed by the pictures or by descriptive texts accompanying them.  If the procedure has been photographed/videotaped, the surgeon will obtain the original picture, image, videotape or CD.  The hospital will not be responsible for storage, release or maintenance of the picture, image, tape or CD.    7.   I consent to the presence of a  or observers in the operating room as deemed necessary by my physician or their designees.    8.   I recognize that in the event my procedure  results in extended X-Ray/fluoroscopy time, I may develop a skin reaction.    9. If I have a Do Not Attempt Resuscitation (DNAR) order in place, that status will be suspended while in the operating room, procedural suite, and during the recovery period unless otherwise explicitly stated by me (or a person authorized to consent on my behalf). The surgeon or my attending physician will determine when the applicable recovery period ends for purposes of reinstating the DNAR order.  10. Patients having a sterilization procedure: I understand that if the procedure is successful the results will be permanent and it will therefore be impossible for me to inseminate, conceive, or bear children.  I also understand that the procedure is intended to result in sterility, although the result has not been guaranteed.   11. I acknowledge that my physician has explained sedation/analgesia administration to me including the risk and benefits I consent to the administration of sedation/analgesia as may be necessary or desirable in the judgment of my physician.    I CERTIFY THAT I HAVE READ AND FULLY UNDERSTAND THE ABOVE CONSENT TO OPERATION and/or OTHER PROCEDURE.     _________________________________________ _________________________________     ___________________________________  Signature of Patient     Signature of Responsible Person                   Printed Name of Responsible Person                              _________________________________________ ______________________________        ___________________________________  Signature of Witness         Date  Time         Relationship to Patient    Patient Name: Alisha Wilde    : 10/25/1965   Printed: 2025      Medical Record #: [id number]                                              Page 1 of 1

## (undated) NOTE — LETTER
12/16/2019      To Whom It May Concern:    Apolonia Dobson is currently under my medical care and may not return to at this time. Please excuse Rajeev Ponce for 8 days. She may return to work on 12/17/19  Activity is restricted as follows: none    If you require additional information please contact our office. Sincerely,    [facility].  Carlie Caballero, [facility] [address]   [phone]

## (undated) NOTE — ED AVS SNAPSHOT
[facility] Emergency Department    [address]     [address] [place] [address]    Phone:  [phone]    Fax:  [phone]           Rudi Tarango   MRN: [id number]    Department:  [facility] Emergency Department   Date of Visit:  2/16/2 and Class Registration line at [phone] or find a doctor online by visiting [email or web address].    IF THERE IS ANY CHANGE OR WORSENING OF YOUR CONDITION, CALL YOUR PRIMARY CARE PHYSICIAN AT ONCE OR RETURN IMMEDIATELY TO [address] [place] [address].     If

## (undated) NOTE — LETTER
9/13/2017          To Whom It May Concern:    Jaspal Jimenez is currently under my medical care and may not return to work at this time. Please excuse Umberto Barthel for 1 weeks. She may return to work on Monday, 9/18/17.   Activity is restricted as follows: non

## (undated) NOTE — LETTER
May 22, 2024      No Recipients     Patient: Alisha Wilde   YOB: 1965   Date of Visit: 2024       Dear Dr. Soni Recipients:    Thank you for referring Alisha Wilde to me for evaluation. Here is my assessment and plan of care:    Alisha Wilde is a 58 year old female.    HPI:     HPI    Pt here for a DM eye exam.   States with the glasses on she has no vision issues.   Wears progressive bifocals that are a year old and comfortable with them.     No H/O of prior eye Sx/trauma.     Pt has been a pre-diabetic for 5 years       Pt's diabetes is currently controlled by diet  Pt does not check BS regularly.    Pt's last blood sugar was  94  Last HA1C was 6.2 on 23  Endocrinologist: None       Last edited by Shavonne Tidwell OT on 2024  4:11 PM.        Patient History:  Past Medical History:    Chronic kidney disease (CKD)    Esophageal reflux    High blood pressure    High cholesterol    HYPERTENSION    Hypertension    Unspecified essential hypertension       Surgical History: Alisha Wilde has a past surgical history that includes endometrial ablation () (child passed away for 5 mins);  (1); other surgical history (11) (cysto-Dr. Lacey -- pt denies); and colonoscopy (N/A, 2018) (Procedure: COLONOSCOPY;  Surgeon: Magdy Webber MD;  Location: [facility] ENDOSCOPY).    Family History   Problem Relation Age of Onset    Hypertension Mother     Heart Disorder Mother 70    Other (Other) Mother         kidney failure    Hypertension Father     Hypertension Maternal Grandfather     Cancer Neg     Diabetes Neg     Glaucoma Neg     Macular degeneration Neg        Social History:   Social History     Socioeconomic History    Marital status: Single   Tobacco Use    Smoking status: Former     Current packs/day: 0.00     Average packs/day: 1 pack/day for 13.0 years (13.0 ttl pk-yrs)     Types: Cigarettes     Start date:      Quit date:      Years since quittin.4     Smokeless tobacco: Former   Vaping Use    Vaping status: Never Used   Substance and Sexual Activity    Alcohol use: Yes     Alcohol/week: 1.0 - 2.0 standard drink of alcohol     Types: 1 - 2 Cans of beer per week     Comment: every 2-3 months     Drug use: No     Social Determinants of Health      Received from [facility]       Medications:  Current Outpatient Medications   Medication Sig Dispense Refill    fluticasone-salmeterol 250-50 MCG/ACT Inhalation Aerosol Powder, Breath Activated Inhale 1 puff into the lungs 2 (two) times daily. inhale 1 puff by INHALATION route 2 times every day morning and evening approximately 12 hours apart 1 each 5    albuterol 108 (90 Base) MCG/ACT Inhalation Aero Soln Inhale 2 puffs into the lungs every 4 (four) hours as needed for Wheezing. 3 each 3    fluticasone propionate 50 MCG/ACT Nasal Suspension 2 sprays by Nasal route daily. 1 each 0    amLODIPine 10 MG Oral Tab Take 1 tablet (10 mg total) by mouth daily. 90 tablet 3    metoprolol succinate ER 50 MG Oral Tablet 24 Hr Take 1 tablet (50 mg total) by mouth daily. 90 tablet 3    VITAMIN D OR Take by mouth.      Ascorbic Acid (VITAMIN C) 1000 MG Oral Tab Vitamin C 1,000 mg tablet, [RxNorm: 600439]      REPATHA SURECLICK 140 MG/ML Subcutaneous Solution Auto-injector       PRALUENT 75 MG/ML Subcutaneous Solution Auto-injector       sodium chloride (SALINE MIST SPRAY) 0.65 % Nasal Solution 1 spray by Nasal route as needed. 60 mL 0    Potassium Chloride ER 20 MEQ Oral Tab CR Take 1 tablet by mouth daily. 90 tablet 3    Losartan Potassium-HCTZ 100-12.5 MG Oral Tab Take 1 tablet by mouth daily. 90 tablet 3    vitamin B-12 50 MCG Oral Tab Take 1 tablet (50 mcg total) by mouth daily.         Allergies:  Allergies   Allergen Reactions    Atorvastatin MYALGIA    Pravastatin MYALGIA    Rosuvastatin MYALGIA    Seasonal Runny nose       ROS:       PHYSICAL EXAM:     Base Eye Exam       Visual Acuity (Snellen - Linear)          Right Left    Dist cc 20/25 +2 20/20 -2    Dist ph cc 20/20     Near cc 20/25 20/20      Correction: Glasses              Tonometry (Icare, 3:36 PM)         Right Left    Pressure 22 23              Pupils         Pupils    Right PERRL    Left PERRL              Visual Fields         Left Right     Full Full              Extraocular Movement         Right Left     Full Full                  Slit Lamp and Fundus Exam       Slit Lamp Exam         Right Left    Lids/Lashes Dermatochalasis, Meibomian gland dysfunction Dermatochalasis, Meibomian gland dysfunction    Conjunctiva/Sclera Ocular Melanosis Ocular Melanosis    Cornea Clear Clear    Anterior Chamber Deep and quiet Deep and quiet    Iris Normal Normal    Lens Trace Nuclear sclerosis Trace Nuclear sclerosis    Vitreous Clear Clear              Fundus Exam         Right Left    Disc Sloping margin, Temporal crescent Sloping margin, Temporal crescent    C/D Ratio 0.5 0.5    Macula Normal-no BDR Normal-no BDR    Vessels Normal Normal    Periphery Normal Normal                  Refraction       Wearing Rx         Sphere Cylinder Axis Add    Right -6.50 +0.50 113 +2.25    Left -7.00 +1.25 064 +2.25      Age: 1yr    Type: Progressive bifocal              Manifest Refraction (Auto)         Sphere Cylinder Axis    Right -5.75 +0.25 100    Left -5.50 +0.50 070   Declines refraction, states sees optometrist for glasses and uses her vision insurance.                     ASSESSMENT/PLAN:     Diagnoses and Plan:     Controlled type 2 diabetes mellitus without complication, without long-term current use of insulin ([facility])  Diet controlled diabetes type II: no background of retinopathy, no signs of neovascularization noted.  Discussed ocular and systemic benefits of blood sugar control.  Diagnosis and treatment discussed in detail with patient.      Will see patient in 2 years for a diabetic exam    Age-related nuclear cataract of both eyes  Discussed early cataracts with  patient.  No treatment recommended at this time.       No orders of the defined types were placed in this encounter.      Meds This Visit:  Requested Prescriptions      No prescriptions requested or ordered in this encounter        Follow up instructions:  Return in about 2 years (around 5/22/2026) for Diabetic eye exam.    5/22/2024  Scribed by: Vitaliy Mckeon MD        If you have questions, please do not hesitate to call me. I look forward to following Alisha along with you.    Sincerely,        Vitaliy Mckeon MD        CC:   No Recipients    Document electronically generated by: Vitaliy Mckeon MD

## (undated) NOTE — LETTER
[address]  [address]  Authorization for Surgical Operation and Procedure     Date:___________                                                                                                         Time:__________  I hereby authorize Surgeon(s):  Frank Holder MD Pontikis, George, MD, my physician and his/her assistants (if applicable), which may include medical students, residents, and/or fellows, to perform the following surgical operation/ procedure and administer such anesthesia as may be determined necessary by my physician:  Operation/Procedure name (s) Procedure(s):  LEFT CAROTID TO SUBCLAVIAN TRANSPOSITION, ENDOVASCULAR ARCH REPAIR. STENTING OF THE LEFT RENAL, LEFT ILIAC. PHYSICIAN MODIFIED GRAFT. WITH NEUROMONITORING. SEE CATH LAB NOTES.  ANGIOGRAM ADD-ON on Alisha Wilde   2.   I recognize that during the surgical operation/procedure, unforeseen conditions may necessitate additional or different procedures than those listed above.  I, therefore, further authorize and request that the above-named surgeon, assistants, or designees perform such procedures as are, in their judgment, necessary and desirable.    3.   My surgeon/physician has discussed prior to my surgery the potential benefits, risks and side effects of this procedure; the likelihood of achieving goals; and potential problems that might occur during recuperation.  They also discussed reasonable alternatives to the procedure, including risks, benefits, and side effects related to the alternatives and risks related to not receiving this procedure.  I have had all my questions answered and I acknowledge that no guarantee has been made as to the result that may be obtained.    4.   Should the need arise during my operation/procedure, which includes change of level of care prior to discharge, I also consent to the administration of blood and/or blood products.  Further, I understand that despite careful  testing and screening of blood or blood products by collecting agencies, I may still be subject to ill effects as a result of receiving a blood transfusion and/or blood products.  The following are some, but not all, of the potential risks that can occur: fever and allergic reactions, hemolytic reactions, transmission of diseases such as Hepatitis, AIDS and Cytomegalovirus (CMV) and fluid overload.  In the event that I wish to have an autologous transfusion of my own blood, or a directed donor transfusion, I will discuss this with my physician.  Check only if Refusing Blood or Blood Products  I understand refusal of blood or blood products as deemed necessary by my physician may have serious consequences to my condition to include possible death. I hereby assume responsibility for my refusal and release the hospital, its personnel, and my physicians from any responsibility for the consequences of my refusal.          o  Refuse      5.   I authorize the use of any specimen, organs, tissues, body parts or foreign objects that may be removed from my body during the operation/procedure for diagnosis, research or teaching purposes and their subsequent disposal by hospital authorities.  I also authorize the release of specimen test results and/or written reports to my treating physician on the hospital medical staff or other referring or consulting physicians involved in my care, at the discretion of the Pathologist or my treating physician.    6.   I consent to the photographing or videotaping of the operations or procedures to be performed, including appropriate portions of my body for medical, scientific, or educational purposes, provided my identity is not revealed by the pictures or by descriptive texts accompanying them.  If the procedure has been photographed/videotaped, the surgeon will obtain the original picture, image, videotape or CD.  The hospital will not be responsible for storage, release or maintenance of  the picture, image, tape or CD.    7.   I consent to the presence of a  or observers in the operating room as deemed necessary by my physician or their designees.    8.   I recognize that in the event my procedure results in extended X-Ray/fluoroscopy time, I may develop a skin reaction.    9. If I have a Do Not Attempt Resuscitation (DNAR) order in place, that status will be suspended while in the operating room, procedural suite, and during the recovery period unless otherwise explicitly stated by me (or a person authorized to consent on my behalf). The surgeon or my attending physician will determine when the applicable recovery period ends for purposes of reinstating the DNAR order.  10. Patients having a sterilization procedure: I understand that if the procedure is successful the results will be permanent and it will therefore be impossible for me to inseminate, conceive, or bear children.  I also understand that the procedure is intended to result in sterility, although the result has not been guaranteed.   11. I acknowledge that my physician has explained sedation/analgesia administration to me including the risk and benefits I consent to the administration of sedation/analgesia as may be necessary or desirable in the judgment of my physician.    I CERTIFY THAT I HAVE READ AND FULLY UNDERSTAND THE ABOVE CONSENT TO OPERATION and/or OTHER PROCEDURE.    _________________________________________  __________________________________  Signature of Patient     Signature of Responsible Person         ___________________________________         Printed Name of Responsible Person           _________________________________                 Relationship to Patient  _________________________________________  ______________________________  Signature of Witness          Date  Time      Patient Name: Alisha Wilde     : 10/25/1965                 Printed: 2025     Medical Record #: [id number]                      Page 1 of 2                                    [address]  [address]    Consent for Anesthesia    IAlisha agree to be cared for by an anesthesiologist, who is specially trained to monitor me and give me medicine to put me to sleep or keep me comfortable during my procedure    I understand that my anesthesiologist is not an employee or agent of [facility] or [employer]. He or she works for [employer].    As the patient asking for anesthesia services, I agree to:  Allow the anesthesiologist (anesthesia doctor) to give me medicine and do additional procedures as necessary. Some examples are: Starting or using an “IV” to give me medicine, fluids or blood during my procedure, and having a breathing tube placed to help me breathe when I’m asleep (intubation). In the event that my heart stops working properly, I understand that my anesthesiologist will make every effort to sustain my life, unless otherwise directed by [facility] Do Not Resuscitate documents.  Tell my anesthesia doctor before my procedure:  If I am pregnant.  The last time that I ate or drank.  All of the medicines I take (including prescriptions, herbal supplements, and pills I can buy without a prescription (including street drugs/illegal medications). Failure to inform my anesthesiologist about these medicines may increase my risk of anesthetic complications.  If I am allergic to anything or have had a reaction to anesthesia before.  I understand how the anesthesia medicine will help me (benefits).  I understand that with any type of anesthesia medicine there are risks:  The most common risks are: nausea, vomiting, sore throat, muscle soreness, damage to my eyes, mouth, or teeth (from breathing tube placement).  Rare risks include: remembering what happened during my procedure, allergic reactions to medications, injury to my airway, heart, lungs,  vision, nerves, or muscles and in extremely rare instances death.  My doctor has explained to me other choices available to me for my care (alternatives).  Pregnant Patients (“epidural”):  I understand that the risks of having an epidural (medicine given into my back to help control pain during labor), include itching, low blood pressure, difficulty urinating, headache or slowing of the baby’s heart. Very rare risks include infection, bleeding, seizure, irregular heart rhythms and nerve injury.  Regional Anesthesia (“spinal”, “epidural”, & “nerve blocks”):  I understand that rare but potential complications include headache, bleeding, infection, seizure, irregular heart rhythms, and nerve injury.    I can change my mind about having anesthesia services at any time before I get the medicine.    _____________________________________________________________________________  Patient (or Representative) Signature/Relationship to Patient  Date   Time    _____________________________________________________________________________   Name (if used)    Language/Organization   Time    _____________________________________________________________________________  Anesthesiologist Signature     Date   Time  I have discussed the procedure and information above with the patient (or patient’s representative) and answered their questions. The patient or their representative has agreed to have anesthesia services.    _____________________________________________________________________________  Witness        Date   Time  I have verified that the signature is that of the patient or patient’s representative, and that it was signed before the procedure  Patient Name: Alisha Wilde     : 10/25/1965                 Printed: 2025     Medical Record #: [id number]                     Page 2 of 2

## (undated) NOTE — LETTER
February 16, 2017    Patient: Dayana Hays   Date of Visit: 2/16/2017       To Whom It May Concern:    Dayana Hays was seen and treated in our emergency department on 2/16/2017. She should not return to work until 2/21/17.     If you have any questio

## (undated) NOTE — LETTER
11/3/2022              [facility], [address] [place] [address]         Dear Gissell Ribera,    This letter is to inform you that our office has made several attempts to reach you by phone without success. We were attempting to contact you by phone regarding scheduling your Colonoscopy. Please contact our office at the number listed below as soon as you receive this letter to discuss this issue and to make the necessary changes in our system to your contact information. Thank you for your cooperation.         Sincerely,    MD Tatiana Corbett [place] [address]  [phone]

## (undated) NOTE — LETTER
[place] [address]  Authorization for Invasive Procedures  Date: 12/03/2024             Time: 2200    I hereby authorize Dr. Claribel Nolan, my physician and his/her assistants (if applicable), which may include medical students, residents, and/or fellows, to perform the following surgical operation/ procedure and administer such anesthesia as may be determined necessary by my physician:  surgical placement of tracheostomy  on Alisha Wilde  2.   I recognize that during the surgical operation/procedure, unforeseen conditions may necessitate additional or different procedures than those listed above.  I, therefore, further authorize and request that the above-named surgeon, assistants, or designees perform such procedures as are, in their judgment, necessary and desirable.    3.   My surgeon/physician has discussed prior to my surgery the potential benefits, risks and side effects of this procedure; the likelihood of achieving goals; and potential problems that might occur during recuperation.  They also discussed reasonable alternatives to the procedure, including risks, benefits, and side effects related to the alternatives and risks related to not receiving this procedure.  I have had all my questions answered and I acknowledge that no guarantee has been made as to the result that may be obtained.    4.   Should the need arise during my operation/procedure, which includes change of level of care prior to discharge, I also consent to the administration of blood and/or blood products.  Further, I understand that despite careful testing and screening of blood or blood products by collecting agencies, I may still be subject to ill effects as a result of receiving a blood transfusion and/or blood products.  The following are some, but not all, of the potential risks that can occur: fever and allergic reactions, hemolytic reactions, transmission of diseases such as Hepatitis, AIDS and  Cytomegalovirus (CMV) and fluid overload.  In the event that I wish to have an autologous transfusion of my own blood, or a directed donor transfusion, I will discuss this with my physician.   Check only if Refusing Blood or Blood Products  I understand refusal of blood or blood products as deemed necessary by my physician may have serious consequences to my condition to include possible death. I hereby assume responsibility for my refusal and release the hospital, its personnel, and my physicians from any responsibility for the consequences of my refusal.         o  Refuse         5.   I authorize the use of any specimen, organs, tissues, body parts or foreign objects that may be removed from my body during the operation/procedure for diagnosis, research or teaching purposes and their subsequent disposal by hospital authorities.  I also authorize the release of specimen test results and/or written reports to my treating physician on the hospital medical staff or other referring or consulting physicians involved in my care, at the discretion of the Pathologist or my treating physician.    6.   I consent to the photographing or videotaping of the operations or procedures to be performed, including appropriate portions of my body for medical, scientific, or educational purposes, provided my identity is not revealed by the pictures or by descriptive texts accompanying them.  If the procedure has been photographed/videotaped, the surgeon will obtain the original picture, image, videotape or CD.  The hospital will not be responsible for storage, release or maintenance of the picture, image, tape or CD.    7.   I consent to the presence of a  or observers in the operating room as deemed necessary by my physician or their designees.    8.   I recognize that in the event my procedure results in extended X-Ray/fluoroscopy time, I may develop a skin reaction.    9. If I have a Do Not Attempt Resuscitation  (DNAR) order in place, that status will be suspended while in the operating room, procedural suite, and during the recovery period unless otherwise explicitly stated by me (or a person authorized to consent on my behalf). The surgeon or my attending physician will determine when the applicable recovery period ends for purposes of reinstating the DNAR order.  10. Patients having a sterilization procedure: I understand that if the procedure is successful the results will be permanent and it will therefore be impossible for me to inseminate, conceive, or bear children.  I also understand that the procedure is intended to result in sterility, although the result has not been guaranteed.   11. I acknowledge that my physician has explained sedation/analgesia administration to me including the risk and benefits I consent to the administration of sedation/analgesia as may be necessary or desirable in the judgment of my physician.    I CERTIFY THAT I HAVE READ AND FULLY UNDERSTAND THE ABOVE CONSENT TO OPERATION and/or OTHER PROCEDURE.        ____________________________________       _________________________________      ______________________________  Signature of Patient         Signature of Responsible Person        Printed Name of Responsible Person        ____________________________________      _________________________________      ______________________________       Signature of Witness          Relationship to Patient                       Date                                       Time  Patient Name: Alisha Wilde  : 10/25/1965    Reviewed: 2024   Printed: December 3, 2024  Medical Record #: [id number] Page 1 of 2             STATEMENT OF PHYSICIAN My signature below affirms that prior to the time of the procedure; I have explained to the patient and/or his/her legal representative, the risks and benefits involved in the proposed treatment and any reasonable alternative to the proposed treatment. I have  also explained the risks and benefits involved in refusal of the proposed treatment and alternatives to the proposed treatment and have answered the patient's questions. If I have a significant financial interest in a co-management agreement or a significant financial interest in any product or implant, or other significant relationship used in this procedure/surgery, I have disclosed this and had a discussion with my patient.     _______________________________________________________________ _____________________________  (Signature of Physician)                                                                                         (Date)                                   (Time)  Patient Name: Alisha Wilde  : 10/25/1965    Reviewed: 2024   Printed: December 3, 2024  Medical Record #: [id number] Page 2 of 2

## (undated) NOTE — LETTER
12/10/2019          To Whom It May Concern:    Gabriella Keenan is currently under my medical care and may not return to work at this time. Please excuse Fabiola Dye for 4 days. She may return to work on Friday, December 13, 2019.   Activity is restricted as fo

## (undated) NOTE — LETTER
4/4/2019              [address]         Dear Genie Brooks,      It was a pleasure to see you at our [address] [place] [address] office.   Your test results within normal limitr

## (undated) NOTE — MR AVS SNAPSHOT
[address], [address]  [address] [facility]  [phone]               Thank you for choosing us for your health care visit with Pat Avendano MD.  We are glad to serve you and happy to provide you with this summary TSH W Reflex To Free T4 [E]    Complete by:  Apr 17, 2017 (Approximate)    Assoc Dx: Well adult exam [Z00.00]           Vitamin D, 25-Hydroxy [E]    Complete by:  Apr 17, 2017 (Approximate)    Assoc Dx:   Well adult exam [Z00.00]                 Follow-up Alf.tn

## (undated) NOTE — LETTER
[place] [address], [place], [place]    Authorization for Surgical Operation and Procedure                               I hereby authorize Toma Barrientos MD, my physician and his/her assistants (if applicable), which may include medical students, residents, and/or fellows, to perform the following surgical operation/ procedure and administer such anesthesia as may be determined necessary by my physician: Operation/Procedure name (s) ESOPHAGOGASTRODUODENOSCOPY (EGD) with percutaneous endoscopic gastrostomy tube placement on Alisha Wilde   2.   I recognize that during the surgical operation/procedure, unforeseen conditions may necessitate additional or different procedures than those listed above.  I, therefore, further authorize and request that the above-named surgeon, assistants, or designees perform such procedures as are, in their judgment, necessary and desirable.    3.   My surgeon/physician has discussed prior to my surgery the potential benefits, risks and side effects of this procedure; the likelihood of achieving goals; and potential problems that might occur during recuperation.  They also discussed reasonable alternatives to the procedure, including risks, benefits, and side effects related to the alternatives and risks related to not receiving this procedure.  I have had all my questions answered and I acknowledge that no guarantee has been made as to the result that may be obtained.    4.   Should the need arise during my operation/procedure, which includes change of level of care prior to discharge, I also consent to the administration of blood and/or blood products.  Further, I understand that despite careful testing and screening of blood or blood products by collecting agencies, I may still be subject to ill effects as a result of receiving a blood transfusion and/or blood products.  The following are some, but not all, of the potential risks that can occur: fever and  allergic reactions, hemolytic reactions, transmission of diseases such as Hepatitis, AIDS and Cytomegalovirus (CMV) and fluid overload.  In the event that I wish to have an autologous transfusion of my own blood, or a directed donor transfusion, I will discuss this with my physician.  Check only if Refusing Blood or Blood Products  I understand refusal of blood or blood products as deemed necessary by my physician may have serious consequences to my condition to include possible death. I hereby assume responsibility for my refusal and release the hospital, its personnel, and my physicians from any responsibility for the consequences of my refusal.    o  Refuse   5.   I authorize the use of any specimen, organs, tissues, body parts or foreign objects that may be removed from my body during the operation/procedure for diagnosis, research or teaching purposes and their subsequent disposal by hospital authorities.  I also authorize the release of specimen test results and/or written reports to my treating physician on the hospital medical staff or other referring or consulting physicians involved in my care, at the discretion of the Pathologist or my treating physician.    6.   I consent to the photographing or videotaping of the operations or procedures to be performed, including appropriate portions of my body for medical, scientific, or educational purposes, provided my identity is not revealed by the pictures or by descriptive texts accompanying them.  If the procedure has been photographed/videotaped, the surgeon will obtain the original picture, image, videotape or CD.  The hospital will not be responsible for storage, release or maintenance of the picture, image, tape or CD.    7.   I consent to the presence of a  or observers in the operating room as deemed necessary by my physician or their designees.    8.   I recognize that in the event my procedure results in extended X-Ray/fluoroscopy  time, I may develop a skin reaction.    9. If I have a Do Not Attempt Resuscitation (DNAR) order in place, that status will be suspended while in the operating room, procedural suite, and during the recovery period unless otherwise explicitly stated by me (or a person authorized to consent on my behalf). The surgeon or my attending physician will determine when the applicable recovery period ends for purposes of reinstating the DNAR order.  10. Patients having a sterilization procedure: I understand that if the procedure is successful the results will be permanent and it will therefore be impossible for me to inseminate, conceive, or bear children.  I also understand that the procedure is intended to result in sterility, although the result has not been guaranteed.   11. I acknowledge that my physician has explained sedation/analgesia administration to me including the risk and benefits I consent to the administration of sedation/analgesia as may be necessary or desirable in the judgment of my physician.    I CERTIFY THAT I HAVE READ AND FULLY UNDERSTAND THE ABOVE CONSENT TO OPERATION and/or OTHER PROCEDURE.     ____________________________________  _________________________________        ______________________________  Signature of Patient    Signature of Responsible Person                Printed Name of Responsible Person                                      ____________________________________  _____________________________                ________________________________  Signature of Witness        Date  Time         Relationship to Patient    STATEMENT OF PHYSICIAN My signature below affirms that prior to the time of the procedure; I have explained to the patient and/or his/her legal representative, the risks and benefits involved in the proposed treatment and any reasonable alternative to the proposed treatment. I have also explained the risks and benefits involved in refusal of the proposed treatment and  alternatives to the proposed treatment and have answered the patient's questions. If I have a significant financial interest in a co-management agreement or a significant financial interest in any product or implant, or other significant relationship used in this procedure/surgery, I have disclosed this and had a discussion with my patient.     _____________________________________________________              _____________________________  (Signature of Physician)                                                                                         (Date)                                   (Time)  Patient Name: Alisha Wilde      : 10/25/1965      Printed: 2024     Medical Record #: [id number]                                      Page 1 of 1

## (undated) NOTE — LETTER
February 3, 2022         KATHERINE Maynard  [address] [facility]      Patient: Mandy Coe   YOB: 1965   Date of Visit: 2/3/2022       Dear KATHERINE Nash Ma,    I saw your patient, Mandy Coe, on 2/3/2022. Enclosed is my consultation / progress note from that encounter. Thank you for allowing me to participate in the care of this patient. Sincerely,                           Tung Staley DPM  [facility].  [address], [place] [address] [address]    Document electronically generated by:  Tung Staley DPM on 2/3/2022    CC: No Recipients    Enclosure

## (undated) NOTE — Clinical Note
[facility]  Administration of Anesthesia  1. IAlisha agree to be cared for by a physician anesthesiologist alone and/or with a nurse anesthetist, who is specially trained to monitor me and give me medicine to put me to sleep or keep me comfortable during my procedure    I understand that my anesthesiologist and/or anesthetist is not an employee or agent of [facility] or [employer]. He or she works for [facility]    2. As the patient asking for anesthesia services, I agree to:  a. Allow the anesthesiologist (anesthesia doctor) to give me medicine and do additional procedures as necessary. Some examples are: Starting or using an “IV” to give me medicine, fluids or blood during my procedure, and having a breathing tube placed to help me breathe when I’m asleep (intubation). In the event that my heart stops working properly, I understand that my anesthesiologist will make every effort to sustain my life, unless otherwise directed by [facility] Do Not Resuscitate documents.  b. Tell my anesthesia doctor before my procedure:  i. If I am pregnant.  ii. The last time that I ate or drank.  iii. All of the medicines I take (including prescriptions, herbal supplements, and pills I can buy without a prescription (including street drugs/illegal medications). Failure to inform my anesthesiologist about these medicines may increase my risk of anesthetic complications.  iv.If I am allergic to anything or have had a reaction to anesthesia before.  3. I understand how the anesthesia medicine will help me (benefits).  4. I understand that with any type of anesthesia medicine there are risks:  a. The most common risks are: nausea, vomiting, sore throat, muscle soreness, damage to my eyes, mouth, or teeth (from breathing tube placement).  b. Rare risks include: remembering what happened during my procedure, allergic reactions to medications, injury to my airway, heart,  lungs, vision, nerves, or muscles and in extremely rare instances death.  5. My doctor has explained to me other choices available to me for my care (alternatives).  6. Pregnant Patients (“epidural”):  I understand that the risks of having an epidural (medicine given into my back to help control pain during labor), include itching, low blood pressure, difficulty urinating, headache or slowing of the baby’s heart. Very rare risks include infection, bleeding, seizure, irregular heart rhythms and nerve injury.  7. Regional Anesthesia (“spinal”, “epidural”, & “nerve blocks”):  I understand that rare but potential complications include headache, bleeding, infection, seizure, irregular heart rhythms, and nerve injury.    _____________________________________________________________________________  Patient (or Representative) Signature/Relationship to Patient  Date   Time    _____________________________________________________________________________   Name (if used)    Language/Organization   Time    _____________________________________________________________________________  Nurse Anesthetist Signature     Date   Time  _____________________________________________________________________________  Anesthesiologist Signature     Date   Time  I have discussed the procedure and information above with the patient (or patient’s representative) and answered their questions. The patient or their representative has agreed to have anesthesia services.    _____________________________________________________________________________  Witness        Date   Time  I have verified that the signature is that of the patient or patient’s representative, and that it was signed before the procedure  Patient Name: Alisha Wilde     : 10/25/1965                 Printed: 12/3/2024 at 10:05 PM    Medical Record #: [id number]                                            Page 1 of 1  ----------ANESTHESIA CONSENT----------

## (undated) NOTE — ED AVS SNAPSHOT
[facility] Emergency Department    [address]     [address] [place] [address]    Phone:  [phone]    Fax:  [phone]           Joie Cooper   MRN: [id number]    Department:  [facility] Emergency Department   Date of Visit:  4/27/2 and Class Registration line at [phone] or find a doctor online by visiting [email or web address].    IF THERE IS ANY CHANGE OR WORSENING OF YOUR CONDITION, CALL YOUR PRIMARY CARE PHYSICIAN AT ONCE OR RETURN IMMEDIATELY TO [address] [place] [address].     If

## (undated) NOTE — LETTER
Date: 5/30/2025  Patient name: Alisha Wilde  YOB: 1965  Medical Record Number: [id number]  Primary Coverage: Payor: [employer] / Plan: [employer] CHOICE/HMO/POS/EPO / Product Type: HMO /   Secondary Coverage:   Insurance ID: [id number]  Patient Address: [address] [place] [address] [address] [place] [id number]  Telephone Information:   Home Phone [phone]   Mobile [phone]       Encounter Date: 5/30/2025  Provider: KATHERINE West  Diagnosis:     ICD-10-CM   1. Disruption of external surgical wound, sequela  T81.31XS   2. Controlled type 2 diabetes mellitus without complication, without long-term current use of insulin (HCC)  E11.9         Wound 05/16/25 #1 Groin Right (Active)   Date First Assessed/Time First Assessed: 05/16/25 0842    Wound Number (Wound Clinic Only): #1  Primary Wound Type: Old surgical  Location: Groin  Wound Location Orientation: Right      Assessments 5/16/2025  8:50 AM 5/30/2025  8:52 AM   Wound Image       Drainage Amount Large Small   Drainage Description Sanguineous Serosanguineous   Treatments Wound Vac - Neg Pressure --   Wound Vac Brand KCI --   Wound Length (cm) 0.4 cm 0.3 cm   Wound Width (cm) 3.2 cm 1.2 cm   Wound Surface Area (cm^2) 1.01 cm^2 0.28 cm^2   Wound Depth (cm) 0.9 cm 0.5 cm   Wound Volume (cm^3) 0.603 cm^3 0.094 cm^3   Wound Healing % -- 84   Margins Well-defined edges Well-defined edges   Non-staged Wound Description Full thickness Full thickness   Pamela-wound Assessment Edema;Induration Clean   Wound Granulation Tissue Pink;Spongy;Red Firm;Pink   Wound Bed Granulation (%) 60 % 100 %   Wound Bed Slough (%) 10 % --   Wound Odor -- None   Shape 30% integrated skin sub, 7 clear sutures noted to periwound --       No associated orders.               Wound Cleaning and Dressings:  Showering directions: May shower and/or cleanse wound with mild soap and water  Wound cleansing:  Clean with soap and water  Wound cleaning frequency: 2 times  per day  Wound product: Bordered gauze and Other Gentamicin ointment  Dressing change frequency:  Change dressing twice daily  Enzymatic agent:  Not applicable        Follow Up:  No follow-ups on file.      Additional Notes:  Gentamicin ointment twice daily, cover with dry dressing.

## (undated) NOTE — Clinical Note
[facility]  Administration of Anesthesia  1. IAlisha agree to be cared for by a physician anesthesiologist alone and/or with a nurse anesthetist, who is specially trained to monitor me and give me medicine to put me to sleep or keep me comfortable during my procedure    I understand that my anesthesiologist and/or anesthetist is not an employee or agent of [facility] or [employer]. He or she works for [facility]    2. As the patient asking for anesthesia services, I agree to:  a. Allow the anesthesiologist (anesthesia doctor) to give me medicine and do additional procedures as necessary. Some examples are: Starting or using an “IV” to give me medicine, fluids or blood during my procedure, and having a breathing tube placed to help me breathe when I’m asleep (intubation). In the event that my heart stops working properly, I understand that my anesthesiologist will make every effort to sustain my life, unless otherwise directed by [facility] Do Not Resuscitate documents.  b. Tell my anesthesia doctor before my procedure:  i. If I am pregnant.  ii. The last time that I ate or drank.  iii. All of the medicines I take (including prescriptions, herbal supplements, and pills I can buy without a prescription (including street drugs/illegal medications). Failure to inform my anesthesiologist about these medicines may increase my risk of anesthetic complications.  iv.If I am allergic to anything or have had a reaction to anesthesia before.  3. I understand how the anesthesia medicine will help me (benefits).  4. I understand that with any type of anesthesia medicine there are risks:  a. The most common risks are: nausea, vomiting, sore throat, muscle soreness, damage to my eyes, mouth, or teeth (from breathing tube placement).  b. Rare risks include: remembering what happened during my procedure, allergic reactions to medications, injury to my airway, heart,  lungs, vision, nerves, or muscles and in extremely rare instances death.  5. My doctor has explained to me other choices available to me for my care (alternatives).  6. Pregnant Patients (“epidural”):  I understand that the risks of having an epidural (medicine given into my back to help control pain during labor), include itching, low blood pressure, difficulty urinating, headache or slowing of the baby’s heart. Very rare risks include infection, bleeding, seizure, irregular heart rhythms and nerve injury.  7. Regional Anesthesia (“spinal”, “epidural”, & “nerve blocks”):  I understand that rare but potential complications include headache, bleeding, infection, seizure, irregular heart rhythms, and nerve injury.    _____________________________________________________________________________  Patient (or Representative) Signature/Relationship to Patient  Date   Time    _____________________________________________________________________________   Name (if used)    Language/Organization   Time    _____________________________________________________________________________  Nurse Anesthetist Signature     Date   Time  _____________________________________________________________________________  Anesthesiologist Signature     Date   Time  I have discussed the procedure and information above with the patient (or patient’s representative) and answered their questions. The patient or their representative has agreed to have anesthesia services.    _____________________________________________________________________________  Witness        Date   Time  I have verified that the signature is that of the patient or patient’s representative, and that it was signed before the procedure  Patient Name: Alisha Wilde     : 10/25/1965                 Printed: 12/3/2024 at 9:53 PM    Medical Record #: [id number]                                            Page 1 of 1  ----------ANESTHESIA CONSENT----------

## (undated) NOTE — ED AVS SNAPSHOT
Shanta Johnson   MRN: [id number]    Department:  [facility] Emergency Department   Date of Visit:  12/8/2019           Disclosure     Insurance plans vary and the physician(s) referred by the ER may not be covered by your plan.  Please contact yo CARE PHYSICIAN AT ONCE OR RETURN IMMEDIATELY TO THE EMERGENCY DEPARTMENT. If you have been prescribed any medication(s), please fill your prescription right away and begin taking the medication(s) as directed.   If you believe that any of the medications

## (undated) NOTE — LETTER
AUTHORIZATION FOR SURGICAL OPERATION OR OTHER PROCEDURE    1.  I hereby authorize Dr. Gopi Starks, and [facility] staff assigned to my case to perform the following operation and/or procedure at the [facility]:    ___________________Yaneth Rodney Patient Name:  ______________________________________________________  (please print)      Patient signature:  ___________________________________________________             Relationship to Patient:           []  Parent    Responsible person

## (undated) NOTE — LETTER
12/16/2019      To Whom It May Concern:    Jase Villanueva is currently under my medical care and may not return to at this time. If you require additional information please contact our office. Sincerely,    Forrest Esquivel.  Kelvin Villanuevas, [facility]. [address]   [phone]

## (undated) NOTE — LETTER
Date: 5/16/2025  Patient name: Alisha Wilde  YOB: 1965  Medical Record Number: [id number]  Primary Coverage: Payor: [employer] / Plan: [employer] CHOICE/HMO/POS/EPO / Product Type: HMO /   Secondary Coverage:   Insurance ID: [id number]  Patient Address: [address] [place] [address] [address] [place] [id number]  Telephone Information:   Home Phone [phone]   Mobile [phone]       Encounter Date: 5/16/2025  Provider: KATHERINE West  Diagnosis:     ICD-10-CM   1. Controlled type 2 diabetes mellitus without complication, without long-term current use of insulin (HCC)  E11.9   2. Disruption of external surgical wound, sequela  T81.31XS   3. Pseudomonas (mallei) causing diseases classd [facility]  B96.5         Negative Pressure Wound Therapy Leg Anterior;Proximal;Right;Upper (Active)   Placement Date/Time: 05/07/25 1424   Inserted by: DR. ESQUIVEL  Location: Leg  Wound Location Orientation: Anterior;Proximal;Right;Upper      Assessments 5/7/2025  2:39 PM 5/16/2025  9:52 AM   Wound photographed/measured -- Yes   Machine Status (On) -- Yes   Site Assessment Clean;Dry;Intact --   Pamela-wound Assessment -- Clean;Moist   Unit Type VAC Ultra KCI/3M   Dressing Type Black foam Black foam   Number of Foam Pieces Used -- 1   Cycle -- Continuous   Target Pressure (mmHg) 125 125   Drainage Description -- Serosanguineous   Dressing Status Clean;Dry;Intact --   Canister Changed -- Yes       No associated orders.       Wound 05/16/25 #1 Groin Right (Active)   Date First Assessed/Time First Assessed: 05/16/25 0842    Wound Number (Wound Clinic Only): #1  Primary Wound Type: Old surgical  Location: Groin  Wound Location Orientation: Right      Assessments 5/16/2025  8:50 AM   Wound Image     Drainage Amount Large   Drainage Description Sanguineous   Treatments Wound Vac - Neg Pressure   Wound Vac Brand KCI   Wound Length (cm) 0.4 cm   Wound Width (cm) 3.2 cm   Wound Surface Area (cm^2) 1.01 cm^2   Wound  Depth (cm) 0.9 cm   Wound Volume (cm^3) 0.603 cm^3   Margins Well-defined edges   Non-staged Wound Description Full thickness   Pamela-wound Assessment Edema;Induration   Wound Granulation Tissue Pink;Spongy;Red   Wound Bed Granulation (%) 60 %   Wound Bed Slough (%) 10 %   Shape 30% integrated skin sub, 7 clear sutures noted to periwound       No associated orders.               Wound Cleaning and Dressings:  Showering directions: May shower with protection  Wound cleansing:  Cleanse with Vashe  Dressing change frequency:  Change dressing 3x per week  Enzymatic agent:  Not applicable        Negative Pressure Wound Therapy:  NPWT: NPWT, black form at 125 mmHg continuous. RN to change dressing 3x/week unless indicated below            Follow Up:  No follow-ups on file.      Additional Notes:  NPWT @125mmHg continuous/high.  Home health nurse to change on Monday and Wednesday.

## (undated) NOTE — LETTER
Date: 5/23/2025  Patient name: Alisha Wilde  YOB: 1965  Medical Record Number: [id number]  Primary Coverage: Payor: [employer] / Plan: [employer] CHOICE/HMO/POS/EPO / Product Type: HMO /   Secondary Coverage:   Insurance ID: [id number]  Patient Address: [address] [place] [address] Dr Davis [address] [place] [id number]  Telephone Information:   Home Phone [phone]   Mobile [phone]       Encounter Date: 5/23/2025  Provider: KATHERINE West  Diagnosis:     ICD-10-CM   1. Disruption of external surgical wound, sequela  T81.31XS   2. Controlled type 2 diabetes mellitus without complication, without long-term current use of insulin (HCC)  E11.9         Wound 05/16/25 #1 Groin Right (Active)   Date First Assessed/Time First Assessed: 05/16/25 0842    Wound Number (Wound Clinic Only): #1  Primary Wound Type: Old surgical  Location: Groin  Wound Location Orientation: Right      Assessments 5/16/2025  8:50 AM 5/23/2025  3:18 PM   Wound Image       Drainage Amount Large Scant   Drainage Description Sanguineous Serosanguineous   Treatments Wound Vac - Neg Pressure --   Wound Vac Brand KCI --   Wound Length (cm) 0.4 cm 0.3 cm   Wound Width (cm) 3.2 cm 1.4 cm   Wound Surface Area (cm^2) 1.01 cm^2 0.33 cm^2   Wound Depth (cm) 0.9 cm 0.5 cm   Wound Volume (cm^3) 0.603 cm^3 0.11 cm^3   Wound Healing % -- 82   Margins Well-defined edges Well-defined edges   Non-staged Wound Description Full thickness Full thickness   Pamela-wound Assessment Edema;Induration Induration;Clean   Wound Granulation Tissue Pink;Spongy;Red Firm;Red   Wound Bed Granulation (%) 60 % 100 %   Wound Bed Slough (%) 10 % --   Wound Odor -- None   Shape 30% integrated skin sub, 7 clear sutures noted to periwound --       No associated orders.           Wound Number: All wounds    Wound Cleaning and Dressings:  Showering directions: May shower with protection  Wound cleansing:  Cleanse with normal saline or wound cleanser  Wound  cleaning frequency: with dressing changes  Wound product: Di collagen, Silver alginate, and Bordered gauze  Dressing change frequency:  Change dressing 3x per week  Enzymatic agent:  Not applicable      Negative Pressure Wound Therapy:  NPWT: Discontinue Negative Pressure Wound Therapy      Miscellaneous/Additional Orders:  Miscellaneous orders: Home health care nurse for wound care    Care Summary:  Care Summary: Discussed Plan of Care at beside with patient. Patient verbally acknowledges understanding of all instructions and all questions were answered.      Follow Up:  Return in about 1 week (around 5/30/2025).      Additional Notes:  HH to see pt MWF for dressing changes unless pt is coming to clinic.     Additional home health DME: No

## (undated) NOTE — LETTER
[address]  [address]  Authorization for Surgical Operation and Procedure     Date:___________                                                                                                         Time:__________  I hereby authorize Surgeon(s):  Frank Holder MD, my physician and his/her assistants (if applicable), which may include medical students, residents, and/or fellows, to perform the following surgical operation/ procedure and administer such anesthesia as may be determined necessary by my physician:  Operation/Procedure name (s) Procedure(s):  LEFT CAROTID TO SUBCLAVIAN TRANSPOSITION, ENDOVASCULAR ARCH REPAIR. PHYSICIAN MODIFIED GRAFT. POSSIBLE LEFT FEMORAL CUT DOWN. WITH NEUROMONITORING.  ANGIOGRAM ADD-ON on Alisha Wilde   2.   I recognize that during the surgical operation/procedure, unforeseen conditions may necessitate additional or different procedures than those listed above.  I, therefore, further authorize and request that the above-named surgeon, assistants, or designees perform such procedures as are, in their judgment, necessary and desirable.    3.   My surgeon/physician has discussed prior to my surgery the potential benefits, risks and side effects of this procedure; the likelihood of achieving goals; and potential problems that might occur during recuperation.  They also discussed reasonable alternatives to the procedure, including risks, benefits, and side effects related to the alternatives and risks related to not receiving this procedure.  I have had all my questions answered and I acknowledge that no guarantee has been made as to the result that may be obtained.    4.   Should the need arise during my operation/procedure, which includes change of level of care prior to discharge, I also consent to the administration of blood and/or blood products.  Further, I understand that despite careful testing and screening of blood or blood products by  collecting agencies, I may still be subject to ill effects as a result of receiving a blood transfusion and/or blood products.  The following are some, but not all, of the potential risks that can occur: fever and allergic reactions, hemolytic reactions, transmission of diseases such as Hepatitis, AIDS and Cytomegalovirus (CMV) and fluid overload.  In the event that I wish to have an autologous transfusion of my own blood, or a directed donor transfusion, I will discuss this with my physician.  Check only if Refusing Blood or Blood Products  I understand refusal of blood or blood products as deemed necessary by my physician may have serious consequences to my condition to include possible death. I hereby assume responsibility for my refusal and release the hospital, its personnel, and my physicians from any responsibility for the consequences of my refusal.          o  Refuse      5.   I authorize the use of any specimen, organs, tissues, body parts or foreign objects that may be removed from my body during the operation/procedure for diagnosis, research or teaching purposes and their subsequent disposal by hospital authorities.  I also authorize the release of specimen test results and/or written reports to my treating physician on the hospital medical staff or other referring or consulting physicians involved in my care, at the discretion of the Pathologist or my treating physician.    6.   I consent to the photographing or videotaping of the operations or procedures to be performed, including appropriate portions of my body for medical, scientific, or educational purposes, provided my identity is not revealed by the pictures or by descriptive texts accompanying them.  If the procedure has been photographed/videotaped, the surgeon will obtain the original picture, image, videotape or CD.  The hospital will not be responsible for storage, release or maintenance of the picture, image, tape or CD.    7.   I consent  to the presence of a  or observers in the operating room as deemed necessary by my physician or their designees.    8.   I recognize that in the event my procedure results in extended X-Ray/fluoroscopy time, I may develop a skin reaction.    9. If I have a Do Not Attempt Resuscitation (DNAR) order in place, that status will be suspended while in the operating room, procedural suite, and during the recovery period unless otherwise explicitly stated by me (or a person authorized to consent on my behalf). The surgeon or my attending physician will determine when the applicable recovery period ends for purposes of reinstating the DNAR order.  10. Patients having a sterilization procedure: I understand that if the procedure is successful the results will be permanent and it will therefore be impossible for me to inseminate, conceive, or bear children.  I also understand that the procedure is intended to result in sterility, although the result has not been guaranteed.   11. I acknowledge that my physician has explained sedation/analgesia administration to me including the risk and benefits I consent to the administration of sedation/analgesia as may be necessary or desirable in the judgment of my physician.    I CERTIFY THAT I HAVE READ AND FULLY UNDERSTAND THE ABOVE CONSENT TO OPERATION and/or OTHER PROCEDURE.    _________________________________________  __________________________________  Signature of Patient     Signature of Responsible Person         ___________________________________         Printed Name of Responsible Person           _________________________________                 Relationship to Patient  _________________________________________  ______________________________  Signature of Witness          Date  Time      Patient Name: Alisha Wilde     : 10/25/1965                 Printed: 2025     Medical Record #: [id number]                     Page 2 of 3                                     [address]  [address]    Consent for Anesthesia    IAlisha agree to be cared for by an anesthesiologist, who is specially trained to monitor me and give me medicine to put me to sleep or keep me comfortable during my procedure    I understand that my anesthesiologist is not an employee or agent of [facility] or [employer]. He or she works for [employer].    As the patient asking for anesthesia services, I agree to:  Allow the anesthesiologist (anesthesia doctor) to give me medicine and do additional procedures as necessary. Some examples are: Starting or using an “IV” to give me medicine, fluids or blood during my procedure, and having a breathing tube placed to help me breathe when I’m asleep (intubation). In the event that my heart stops working properly, I understand that my anesthesiologist will make every effort to sustain my life, unless otherwise directed by [facility] Do Not Resuscitate documents.  Tell my anesthesia doctor before my procedure:  If I am pregnant.  The last time that I ate or drank.  All of the medicines I take (including prescriptions, herbal supplements, and pills I can buy without a prescription (including street drugs/illegal medications). Failure to inform my anesthesiologist about these medicines may increase my risk of anesthetic complications.  If I am allergic to anything or have had a reaction to anesthesia before.  I understand how the anesthesia medicine will help me (benefits).  I understand that with any type of anesthesia medicine there are risks:  The most common risks are: nausea, vomiting, sore throat, muscle soreness, damage to my eyes, mouth, or teeth (from breathing tube placement).  Rare risks include: remembering what happened during my procedure, allergic reactions to medications, injury to my airway, heart, lungs, vision, nerves, or muscles and in extremely rare  instances death.  My doctor has explained to me other choices available to me for my care (alternatives).  Pregnant Patients (“epidural”):  I understand that the risks of having an epidural (medicine given into my back to help control pain during labor), include itching, low blood pressure, difficulty urinating, headache or slowing of the baby’s heart. Very rare risks include infection, bleeding, seizure, irregular heart rhythms and nerve injury.  Regional Anesthesia (“spinal”, “epidural”, & “nerve blocks”):  I understand that rare but potential complications include headache, bleeding, infection, seizure, irregular heart rhythms, and nerve injury.    I can change my mind about having anesthesia services at any time before I get the medicine.    _____________________________________________________________________________  Patient (or Representative) Signature/Relationship to Patient  Date   Time    _____________________________________________________________________________   Name (if used)    Language/Organization   Time    _____________________________________________________________________________  Anesthesiologist Signature     Date   Time  I have discussed the procedure and information above with the patient (or patient’s representative) and answered their questions. The patient or their representative has agreed to have anesthesia services.    _____________________________________________________________________________  Witness        Date   Time  I have verified that the signature is that of the patient or patient’s representative, and that it was signed before the procedure  Patient Name: Alisha Wilde     : 10/25/1965                 Printed: 2025     Medical Record #: [id number]                     Page 3 of 3

## (undated) NOTE — LETTER
9/18/2017          To Whom It May Concern:    Gabriella Keenan is currently under my medical care. Please excuse Fabiola Veronica from work. She may return to work on 09/24/2017. Activity is restricted as follows: none.     If you require additional information ple

## (undated) NOTE — LETTER
[facility]  part of [facility]     PICC INSERTION CONSENT     I agree to have a Peripherally Inserted Central Catheter (PICC) placed in my arm.   1. The PICC insertion procedure, care, maintenance, risks, benefits, and complications have been explained to me by my physician, ________________________, and I understand them.   2. I understand that this may not be the only way I can receive my medication. I understand that my physician has determined that the PICC would be the safest and most effective means of administering my medication at this time. If there are other options of giving medication into my veins those options have been explained to me by my physician and I have chosen this one.   3. I realize a nurse who has been specially trained and certified by the hospital and ’s representative to insert a PICC will perform this procedure. My catheter will be inserted by _____________________________.   4. I have been informed by my doctor of the nature and purpose of this procedure and the risks involved and the possibility of complications. I realize that this is an invasive procedure and has certain risks such as air embolism (air entering the catheter or my vein), arterial puncture (a tearing of one of my arteries), infection, irregular heartbeat and venous thrombosis (a blood clot in a vein) nerve injury and fracture of the catheter with or without migration.   5. In order to numb the area where the line will be placed, a small amount of anesthetic medication will be injected as ordered by my physician.   6. I understand that while the catheter will be placed in my upper arm the end of the catheter will come to rest in an area near my heart.     7. The person performing this procedure has discussed the potential benefits, risks, and side effects of the PICC; the likelihood of achieving goals; and potential problems that might occur during recuperation. They also discussed  reasonable alternatives to the PICC, including risks, benefits, and side effects related to the alternatives and risks related to not receiving this procedure.    8.  I have expressed any questions about this procedure to my physician or the PICC Proceduralist and he/she has answered them.  I certify that I have read and understand this consent to the insertion of a PICC.      _________________________________________________________   Date     Time     Patient/Guardian Signature       ____________________________________   Printed name of Patient/Guardian          ________________________________________________________________    Date        Time                   Witnessing RN Signature      Patient Name: Alisha Wilde     : 10/25/1965                 Printed: 2024     Medical Record #: [id number]

## (undated) NOTE — ED AVS SNAPSHOT
[facility] Emergency Department    [address]     [address] [place] [address]    Phone:  [phone]    Fax:  [phone]           Katie Record   MRN: [id number]    Department:  [facility] Emergency Department   Date of Visit:  2/16/2 Bring a paper prescription for each of these medications    - Acetaminophen-Codeine #3 300-30 MG Tabs  - ibuprofen 600 MG Tabs            Discharge References/Attachments     KNEE PAIN (ENGLISH)      Disclosure     Insurance plans vary and the physician(s IF THERE IS ANY CHANGE OR WORSENING OF YOUR CONDITION, CALL YOUR PRIMARY CARE PHYSICIAN AT ONCE OR RETURN IMMEDIATELY TO THE EMERGENCY DEPARTMENT.     If you have been prescribed any medication(s), please fill your prescription right away and begin taking t - If you don’t have insurance, [facility] has partnered with [facility] to help you get signed up for insurance coverage.   [facility] is a Federal Navigator program that can help with your Affordable Care Act cover

## (undated) NOTE — LETTER
[place] [address]  Authorization for Invasive Procedures  Date: 12/5/2024           Time: 0931    I hereby authorize Dr. JACOB, my physician and his/her assistants (if applicable), which may include medical students, residents, and/or fellows, to perform the following surgical operation/ procedure and administer such anesthesia as may be determined necessary by my physician: placement of percutaneous endoscopic gastrostomy tube on Alisha Wilde  2.   I recognize that during the surgical operation/procedure, unforeseen conditions may necessitate additional or different procedures than those listed above.  I, therefore, further authorize and request that the above-named surgeon, assistants, or designees perform such procedures as are, in their judgment, necessary and desirable.    3.   My surgeon/physician has discussed prior to my surgery the potential benefits, risks and side effects of this procedure; the likelihood of achieving goals; and potential problems that might occur during recuperation.  They also discussed reasonable alternatives to the procedure, including risks, benefits, and side effects related to the alternatives and risks related to not receiving this procedure.  I have had all my questions answered and I acknowledge that no guarantee has been made as to the result that may be obtained.    4.   Should the need arise during my operation/procedure, which includes change of level of care prior to discharge, I also consent to the administration of blood and/or blood products.  Further, I understand that despite careful testing and screening of blood or blood products by collecting agencies, I may still be subject to ill effects as a result of receiving a blood transfusion and/or blood products.  The following are some, but not all, of the potential risks that can occur: fever and allergic reactions, hemolytic reactions, transmission of diseases such as Hepatitis, AIDS and  Cytomegalovirus (CMV) and fluid overload.  In the event that I wish to have an autologous transfusion of my own blood, or a directed donor transfusion, I will discuss this with my physician.   Check only if Refusing Blood or Blood Products  I understand refusal of blood or blood products as deemed necessary by my physician may have serious consequences to my condition to include possible death. I hereby assume responsibility for my refusal and release the hospital, its personnel, and my physicians from any responsibility for the consequences of my refusal.         o  Refuse         5.   I authorize the use of any specimen, organs, tissues, body parts or foreign objects that may be removed from my body during the operation/procedure for diagnosis, research or teaching purposes and their subsequent disposal by hospital authorities.  I also authorize the release of specimen test results and/or written reports to my treating physician on the hospital medical staff or other referring or consulting physicians involved in my care, at the discretion of the Pathologist or my treating physician.    6.   I consent to the photographing or videotaping of the operations or procedures to be performed, including appropriate portions of my body for medical, scientific, or educational purposes, provided my identity is not revealed by the pictures or by descriptive texts accompanying them.  If the procedure has been photographed/videotaped, the surgeon will obtain the original picture, image, videotape or CD.  The hospital will not be responsible for storage, release or maintenance of the picture, image, tape or CD.    7.   I consent to the presence of a  or observers in the operating room as deemed necessary by my physician or their designees.    8.   I recognize that in the event my procedure results in extended X-Ray/fluoroscopy time, I may develop a skin reaction.    9. If I have a Do Not Attempt Resuscitation  (DNAR) order in place, that status will be suspended while in the operating room, procedural suite, and during the recovery period unless otherwise explicitly stated by me (or a person authorized to consent on my behalf). The surgeon or my attending physician will determine when the applicable recovery period ends for purposes of reinstating the DNAR order.  10. Patients having a sterilization procedure: I understand that if the procedure is successful the results will be permanent and it will therefore be impossible for me to inseminate, conceive, or bear children.  I also understand that the procedure is intended to result in sterility, although the result has not been guaranteed.   11. I acknowledge that my physician has explained sedation/analgesia administration to me including the risk and benefits I consent to the administration of sedation/analgesia as may be necessary or desirable in the judgment of my physician.    I CERTIFY THAT I HAVE READ AND FULLY UNDERSTAND THE ABOVE CONSENT TO OPERATION and/or OTHER PROCEDURE.        ____________________________________       _________________________________      ______________________________  Signature of Patient         Signature of Responsible Person        Printed Name of Responsible Person        ____________________________________      _________________________________      ______________________________       Signature of Witness          Relationship to Patient                       Date                                       Time  Patient Name: Alisha Wilde  : 10/25/1965    Reviewed: 2024   Printed: 2024  Medical Record #: [id number] Page 1 of 2             STATEMENT OF PHYSICIAN My signature below affirms that prior to the time of the procedure; I have explained to the patient and/or his/her legal representative, the risks and benefits involved in the proposed treatment and any reasonable alternative to the proposed treatment. I have  also explained the risks and benefits involved in refusal of the proposed treatment and alternatives to the proposed treatment and have answered the patient's questions. If I have a significant financial interest in a co-management agreement or a significant financial interest in any product or implant, or other significant relationship used in this procedure/surgery, I have disclosed this and had a discussion with my patient.     _______________________________________________________________ _____________________________  (Signature of Physician)                                                                                         (Date)                                   (Time)  Patient Name: Alisha Wilde  : 10/25/1965    Reviewed: 2024   Printed: 2024  Medical Record #: [id number] Page 2 of 2

## (undated) NOTE — LETTER
1/18/2024          To Whom It May Concern:    Alisha Wilde is currently under my medical care and may not return to work at this time.    Please excuse Alisha for 1 weeks.  She may return to work on 1/22/2024.  Activity is restricted as follows: none.    If you require additional information please contact our office.        Sincerely,      Aretha Jackson MD          Document generated by:  Aretha Jackson MD

## (undated) NOTE — ED AVS SNAPSHOT
[facility] Emergency Department    [address]     [address] [place] [address]    Phone:  [phone]    Fax:  [phone]           Katie Record   MRN: [id number]    Department:  [facility] Emergency Department   Date of Visit:  4/27/2 It is our goal to assure that you are completely satisfied with every aspect of your visit today.   In an effort to constantly improve our service to you, we would appreciate any positive or negative feedback related to the care you received in our emergenc [employer] account. You may have had testing done that requires us to contact you. Please make sure we have your correct phone number on file.       I certified that I have received a copy of the aftercare instructions; that these instructions have been expl If you have questions, you can call [phone] to talk to our [facility] Staff. Remember, [employer] is NOT to be used for urgent needs. For medical emergencies, dial 911. Visit [email or web address] to learn more.